# Patient Record
Sex: MALE | Race: WHITE | NOT HISPANIC OR LATINO | Employment: STUDENT | URBAN - METROPOLITAN AREA
[De-identification: names, ages, dates, MRNs, and addresses within clinical notes are randomized per-mention and may not be internally consistent; named-entity substitution may affect disease eponyms.]

---

## 2019-10-15 ENCOUNTER — HOSPITAL ENCOUNTER (EMERGENCY)
Facility: HOSPITAL | Age: 24
Discharge: HOME | End: 2019-10-15
Attending: SURGERY
Payer: MEDICAID

## 2019-10-15 VITALS
WEIGHT: 99.88 LBS | OXYGEN SATURATION: 97 % | HEART RATE: 113 BPM | BODY MASS INDEX: 21.61 KG/M2 | SYSTOLIC BLOOD PRESSURE: 129 MMHG | RESPIRATION RATE: 20 BRPM | DIASTOLIC BLOOD PRESSURE: 76 MMHG | TEMPERATURE: 100 F

## 2019-10-15 DIAGNOSIS — J40 BRONCHITIS: Primary | ICD-10-CM

## 2019-10-15 DIAGNOSIS — R05.9 COUGH: ICD-10-CM

## 2019-10-15 LAB
ALBUMIN SERPL BCP-MCNC: 3.9 G/DL (ref 3.5–5.2)
ALP SERPL-CCNC: 128 U/L (ref 55–135)
ALT SERPL W/O P-5'-P-CCNC: 30 U/L (ref 10–44)
ANION GAP SERPL CALC-SCNC: 12 MMOL/L (ref 8–16)
AST SERPL-CCNC: 29 U/L (ref 10–40)
BASOPHILS # BLD AUTO: 0.01 K/UL (ref 0–0.2)
BASOPHILS NFR BLD: 0.1 % (ref 0–1.9)
BILIRUB SERPL-MCNC: 0.4 MG/DL (ref 0.1–1)
BILIRUB UR QL STRIP: NEGATIVE
BNP SERPL-MCNC: <10 PG/ML (ref 0–99)
BUN SERPL-MCNC: 11 MG/DL (ref 6–20)
CALCIUM SERPL-MCNC: 9.1 MG/DL (ref 8.7–10.5)
CHLORIDE SERPL-SCNC: 102 MMOL/L (ref 95–110)
CK MB SERPL-MCNC: 0.4 NG/ML (ref 0.1–6.5)
CK MB SERPL-RTO: 0.2 % (ref 0–5)
CK SERPL-CCNC: 252 U/L (ref 20–200)
CK SERPL-CCNC: 252 U/L (ref 20–200)
CLARITY UR: CLEAR
CO2 SERPL-SCNC: 23 MMOL/L (ref 23–29)
COLOR UR: YELLOW
CREAT SERPL-MCNC: 0.7 MG/DL (ref 0.5–1.4)
DEPRECATED S PYO AG THROAT QL EIA: NEGATIVE
DIFFERENTIAL METHOD: ABNORMAL
EOSINOPHIL # BLD AUTO: 0.1 K/UL (ref 0–0.5)
EOSINOPHIL NFR BLD: 1.7 % (ref 0–8)
ERYTHROCYTE [DISTWIDTH] IN BLOOD BY AUTOMATED COUNT: 12.1 % (ref 11.5–14.5)
EST. GFR  (AFRICAN AMERICAN): >60 ML/MIN/1.73 M^2
EST. GFR  (NON AFRICAN AMERICAN): >60 ML/MIN/1.73 M^2
GLUCOSE SERPL-MCNC: 97 MG/DL (ref 70–110)
GLUCOSE UR QL STRIP: NEGATIVE
HCT VFR BLD AUTO: 43.9 % (ref 40–54)
HGB BLD-MCNC: 14.5 G/DL (ref 14–18)
HGB UR QL STRIP: NEGATIVE
IMM GRANULOCYTES # BLD AUTO: 0.02 K/UL (ref 0–0.04)
IMM GRANULOCYTES NFR BLD AUTO: 0.3 % (ref 0–0.5)
INFLUENZA A, MOLECULAR: NEGATIVE
INFLUENZA B, MOLECULAR: NEGATIVE
KETONES UR QL STRIP: NEGATIVE
LACTATE SERPL-SCNC: 0.8 MMOL/L (ref 0.5–2.2)
LEUKOCYTE ESTERASE UR QL STRIP: NEGATIVE
LYMPHOCYTES # BLD AUTO: 0.6 K/UL (ref 1–4.8)
LYMPHOCYTES NFR BLD: 8.5 % (ref 18–48)
MCH RBC QN AUTO: 31.2 PG (ref 27–31)
MCHC RBC AUTO-ENTMCNC: 33 G/DL (ref 32–36)
MCV RBC AUTO: 94 FL (ref 82–98)
MONOCYTES # BLD AUTO: 1.2 K/UL (ref 0.3–1)
MONOCYTES NFR BLD: 15.8 % (ref 4–15)
NEUTROPHILS # BLD AUTO: 5.5 K/UL (ref 1.8–7.7)
NEUTROPHILS NFR BLD: 73.6 % (ref 38–73)
NITRITE UR QL STRIP: NEGATIVE
NRBC BLD-RTO: 0 /100 WBC
PH UR STRIP: 7 [PH] (ref 5–8)
PLATELET # BLD AUTO: 188 K/UL (ref 150–350)
PMV BLD AUTO: 10.5 FL (ref 9.2–12.9)
POTASSIUM SERPL-SCNC: 3.9 MMOL/L (ref 3.5–5.1)
PROT SERPL-MCNC: 7.4 G/DL (ref 6–8.4)
PROT UR QL STRIP: NEGATIVE
RBC # BLD AUTO: 4.65 M/UL (ref 4.6–6.2)
SODIUM SERPL-SCNC: 137 MMOL/L (ref 136–145)
SP GR UR STRIP: 1.01 (ref 1–1.03)
SPECIMEN SOURCE: NORMAL
TROPONIN I SERPL DL<=0.01 NG/ML-MCNC: 0.01 NG/ML (ref 0–0.03)
URN SPEC COLLECT METH UR: NORMAL
UROBILINOGEN UR STRIP-ACNC: NEGATIVE EU/DL
WBC # BLD AUTO: 7.52 K/UL (ref 3.9–12.7)

## 2019-10-15 PROCEDURE — 25000003 PHARM REV CODE 250: Performed by: SURGERY

## 2019-10-15 PROCEDURE — 87081 CULTURE SCREEN ONLY: CPT

## 2019-10-15 PROCEDURE — 93005 ELECTROCARDIOGRAM TRACING: CPT

## 2019-10-15 PROCEDURE — 96361 HYDRATE IV INFUSION ADD-ON: CPT

## 2019-10-15 PROCEDURE — 84484 ASSAY OF TROPONIN QUANT: CPT

## 2019-10-15 PROCEDURE — 82553 CREATINE MB FRACTION: CPT

## 2019-10-15 PROCEDURE — 85025 COMPLETE CBC W/AUTO DIFF WBC: CPT

## 2019-10-15 PROCEDURE — 83880 ASSAY OF NATRIURETIC PEPTIDE: CPT

## 2019-10-15 PROCEDURE — 82550 ASSAY OF CK (CPK): CPT

## 2019-10-15 PROCEDURE — 63600175 PHARM REV CODE 636 W HCPCS: Performed by: SURGERY

## 2019-10-15 PROCEDURE — 83605 ASSAY OF LACTIC ACID: CPT

## 2019-10-15 PROCEDURE — 87880 STREP A ASSAY W/OPTIC: CPT

## 2019-10-15 PROCEDURE — 93010 ELECTROCARDIOGRAM REPORT: CPT | Mod: ,,, | Performed by: INTERNAL MEDICINE

## 2019-10-15 PROCEDURE — 96360 HYDRATION IV INFUSION INIT: CPT

## 2019-10-15 PROCEDURE — 93010 EKG 12-LEAD: ICD-10-PCS | Mod: ,,, | Performed by: INTERNAL MEDICINE

## 2019-10-15 PROCEDURE — 81003 URINALYSIS AUTO W/O SCOPE: CPT

## 2019-10-15 PROCEDURE — 87502 INFLUENZA DNA AMP PROBE: CPT

## 2019-10-15 PROCEDURE — 80053 COMPREHEN METABOLIC PANEL: CPT

## 2019-10-15 PROCEDURE — 87040 BLOOD CULTURE FOR BACTERIA: CPT

## 2019-10-15 PROCEDURE — 99285 EMERGENCY DEPT VISIT HI MDM: CPT | Mod: 25

## 2019-10-15 PROCEDURE — 36415 COLL VENOUS BLD VENIPUNCTURE: CPT

## 2019-10-15 RX ORDER — LEVOFLOXACIN 500 MG/1
500 TABLET, FILM COATED ORAL
Status: COMPLETED | OUTPATIENT
Start: 2019-10-15 | End: 2019-10-15

## 2019-10-15 RX ORDER — ACETAMINOPHEN 650 MG/20.3ML
650 LIQUID ORAL
Status: COMPLETED | OUTPATIENT
Start: 2019-10-15 | End: 2019-10-15

## 2019-10-15 RX ORDER — LEVOFLOXACIN 25 MG/ML
500 SOLUTION ORAL DAILY
Qty: 140 ML | Refills: 0 | Status: SHIPPED | OUTPATIENT
Start: 2019-10-15 | End: 2019-10-22

## 2019-10-15 RX ADMIN — SODIUM CHLORIDE 500 ML: 0.9 INJECTION, SOLUTION INTRAVENOUS at 08:10

## 2019-10-15 RX ADMIN — ACETAMINOPHEN 650 MG: 650 SOLUTION ORAL at 10:10

## 2019-10-15 RX ADMIN — LEVOFLOXACIN 500 MG: 500 TABLET, FILM COATED ORAL at 10:10

## 2019-10-15 NOTE — ED TRIAGE NOTES
23 y.o. male presents to ER WALL 01/WALL 01   Chief Complaint   Patient presents with    Fever   pt arrives via [facility] with c/o cold/congestion symptoms & fever x's 1 day. No acute distress noted.

## 2019-10-16 NOTE — ED NOTES
The patient is awake, alert, mother at bedside. Respirations are spontaneous, normal respiratory effort and rate noted, no distress noted, resting comfortably. No change from previous assessment, bed in low, locked position, SR x2, pt able to change position independently. Will continue to monitor.

## 2019-10-16 NOTE — ED NOTES
The patient is awake, alert, mother at bedside. Normal respiratory effort and rate noted, no distress noted, resting comfortably. No change from previous assessment, bed in low, locked position, SR x2, pt able to change position independently. Will continue to monitor.

## 2019-10-16 NOTE — ED NOTES
The patient is awake, alert, mother at bedside. Airway is open and patent, respirations are spontaneous, normal respiratory effort and rate noted, no distress noted, resting comfortably. No change from previous assessment, bed in low, locked position, SR x2, pt able to change position independently. Will continue to monitor.

## 2019-10-16 NOTE — ED PROVIDER NOTES
[facility]                                                 Chief Complaint  23 y.o. male with Fever    History of Present Illness  Glen Moscoso presents to the emergency room with low-grade temperature  Patient has developed a low-grade temperature and a cough, no wheezing  Patient has no sputum production, patient has handicapped with cerebral palsy  Patient is alert and interactive with a very low-grade temperature, no fever  Patient has no symptoms of UTI, mild rhonchi on ER evaluation this evening    The history is provided by the parent   device was not used during this ER visit  Medical history: GERD, cerebral palsy, seizures  Surgeries:  Hip surgery & PEG tube  Review of patient's allergies indicates:  No Known Allergies     I have reviewed all of this patient's past medical, surgical, family, and social   histories as well as active allergies and medications documented in the  electronic medical record    Review of Systems and Physical Exam      Review of Systems  -- Constitution - fever, denies fatigue, no weakness, no chills  -- Eyes - no tearing or redness, no visual disturbance  -- Ear, Nose - no tinnitus or earache, no nasal congestion or discharge  -- Mouth,Throat - no sore throat, no toothache, normal voice, normal swallowing  -- Respiratory - cough and congestion, no shortness of breath, no BOATENG  -- Cardiovascular - denies chest pain, no palpitations, denies claudication  -- Gastrointestinal - denies abdominal pain, nausea, vomiting, or diarrhea  -- Musculoskeletal - denies back pain, negative for trauma or injury  -- Neurological - no headache, denies weakness or seizure; no LOC  -- Skin - denies pallor, rash, or changes in skin. no hives or welts noted    Vital Signs  His tympanic temperature is 99.6 °F (37.6 °C).   His blood pressure is 126/73 and his pulse is 115  His respiration is 20.     Physical Exam  -- Nursing note and vitals reviewed  --  Constitutional: Appears well-developed and well-nourished  -- Head: Atraumatic. Normocephalic. No obvious abnormality  -- Eyes: Pupils are equal and reactive to light. Normal conjunctiva and lids  -- Nose: Nose normal in appearance, nares grossly normal. No discharge  -- Throat: Mucous membranes moist, pharynx normal, normal tonsils. No lesions   -- Ears: External ears and TM normal bilaterally. Normal hearing and no drainage  -- Neck: Normal range of motion. Neck supple. No masses, trachea midline  -- Cardiac: Normal rate, regular rhythm and normal heart sounds  -- Pulmonary: faint rhonchi at the bilateral bases with no active wheezing   -- Abdominal: Soft, no tenderness. Normal bowel sounds. Normal liver edge  -- Musculoskeletal: Normal range of motion, no effusions. Joints stable   -- Neurological: No focal deficits. Showed good interaction with staff  -- Vascular: Posterior tibial, dorsalis pedis and radial pulses 2+ bilaterally      Emergency Room Course      Lab Results     K 3.9      CO2 23   BUN 11   CREATININE 0.7   GLU 97   ALKPHOS 128   AST 29   ALT 30   BILITOT 0.4   ALBUMIN 3.9   PROT 7.4   WBC 7.52   HGB 14.5   HCT 43.9       (H)    (H)   CPKMB 0.4   TROPONINI 0.009   BNP <10   LACTATE 0.8     Urinalysis  -- Urinalysis performed during this ER visit showed no signs of infection     Radiology  -- Chest x-ray showed no infiltrate and showed no acute pathology     Additional Work up  -- the patient tested negative for influenza A in the emergency room today  -- The strep screen was negative  -- Blood cultures have also been drawn, results are pending    Medications Given  acetaminophen oral solution 650 mg (has no administration in time range)   levoFLOXacin tablet 500 mg (has no administration in time range)   sodium chloride 0.9% bolus 500 mL (0 mLs Intravenous Stopped 10/15/19 2202)     ED Management  -- Diagnosis management comments: 23 y.o. male with cough and  congestion  -- patient with cough and congestion with no active wheezing, no SOB noted  -- patient has mild rhonchi with a clear chest x-ray in the emergency room  -- patient has a negative fluid strep swab with a normal white count today  -- patient has a negative urinalysis, afebrile with good stable vital signs    Diagnosis  -- The primary encounter diagnosis was Bronchitis.   -- A diagnosis of Cough was also pertinent to this visit.    Disposition and Plan  -- Disposition: home  -- Condition: stable  -- Follow-up: Parents to follow up with Candelario Santos MD in 1-2 days.  -- I advised the parent(s) that we have found no life threatening condition today  -- At this time, I believe the patient is clinically stable for discharge.   -- The parent(s) acknowledges that close follow up with a MD is required after all ER visits  -- The parent(s) agrees to comply with all instruction and direction given in the ER  -- The parent(s) agrees to return to ER if any symptoms reoccur     This note is dictated on M*Modal word recognition program.  There are word recognition mistakes that are occasionally missed on review.          Bunny Eagle MD  10/15/19 4423

## 2019-10-18 LAB — BACTERIA THROAT CULT: NORMAL

## 2019-10-20 LAB
BACTERIA BLD CULT: NORMAL
BACTERIA BLD CULT: NORMAL

## 2019-11-17 ENCOUNTER — HOSPITAL ENCOUNTER (EMERGENCY)
Facility: HOSPITAL | Age: 24
Discharge: ANOTHER HOSPITAL | End: 2019-11-17
Attending: SURGERY
Payer: MEDICAID

## 2019-11-17 VITALS
RESPIRATION RATE: 20 BRPM | OXYGEN SATURATION: 98 % | SYSTOLIC BLOOD PRESSURE: 148 MMHG | HEART RATE: 105 BPM | BODY MASS INDEX: 18.51 KG/M2 | DIASTOLIC BLOOD PRESSURE: 112 MMHG | WEIGHT: 85.56 LBS | TEMPERATURE: 98 F

## 2019-11-17 DIAGNOSIS — M79.604 RIGHT LEG PAIN: ICD-10-CM

## 2019-11-17 DIAGNOSIS — S72.8X1A OTHER CLOSED FRACTURE OF RIGHT FEMUR, UNSPECIFIED PORTION OF FEMUR, INITIAL ENCOUNTER: Primary | ICD-10-CM

## 2019-11-17 LAB
ALBUMIN SERPL BCP-MCNC: 4.1 G/DL (ref 3.5–5.2)
ALP SERPL-CCNC: 166 U/L (ref 55–135)
ALT SERPL W/O P-5'-P-CCNC: 24 U/L (ref 10–44)
ANION GAP SERPL CALC-SCNC: 12 MMOL/L (ref 8–16)
AST SERPL-CCNC: 24 U/L (ref 10–40)
BASOPHILS # BLD AUTO: 0.02 K/UL (ref 0–0.2)
BASOPHILS NFR BLD: 0.1 % (ref 0–1.9)
BILIRUB SERPL-MCNC: 0.3 MG/DL (ref 0.1–1)
BILIRUB UR QL STRIP: NEGATIVE
BUN SERPL-MCNC: 8 MG/DL (ref 6–20)
CALCIUM SERPL-MCNC: 9.7 MG/DL (ref 8.7–10.5)
CHLORIDE SERPL-SCNC: 103 MMOL/L (ref 95–110)
CLARITY UR: CLEAR
CO2 SERPL-SCNC: 23 MMOL/L (ref 23–29)
COLOR UR: YELLOW
CREAT SERPL-MCNC: 0.7 MG/DL (ref 0.5–1.4)
DEPRECATED S PYO AG THROAT QL EIA: NEGATIVE
DIFFERENTIAL METHOD: ABNORMAL
EOSINOPHIL # BLD AUTO: 0 K/UL (ref 0–0.5)
EOSINOPHIL NFR BLD: 0.1 % (ref 0–8)
ERYTHROCYTE [DISTWIDTH] IN BLOOD BY AUTOMATED COUNT: 11.7 % (ref 11.5–14.5)
EST. GFR  (AFRICAN AMERICAN): >60 ML/MIN/1.73 M^2
EST. GFR  (NON AFRICAN AMERICAN): >60 ML/MIN/1.73 M^2
GLUCOSE SERPL-MCNC: 114 MG/DL (ref 70–110)
GLUCOSE UR QL STRIP: NEGATIVE
HCT VFR BLD AUTO: 45.4 % (ref 40–54)
HGB BLD-MCNC: 15.3 G/DL (ref 14–18)
HGB UR QL STRIP: NEGATIVE
IMM GRANULOCYTES # BLD AUTO: 0.05 K/UL (ref 0–0.04)
IMM GRANULOCYTES NFR BLD AUTO: 0.4 % (ref 0–0.5)
INFLUENZA A, MOLECULAR: NEGATIVE
INFLUENZA B, MOLECULAR: NEGATIVE
KETONES UR QL STRIP: NEGATIVE
LACTATE SERPL-SCNC: 1.3 MMOL/L (ref 0.5–2.2)
LEUKOCYTE ESTERASE UR QL STRIP: NEGATIVE
LYMPHOCYTES # BLD AUTO: 1 K/UL (ref 1–4.8)
LYMPHOCYTES NFR BLD: 7.1 % (ref 18–48)
MCH RBC QN AUTO: 31.9 PG (ref 27–31)
MCHC RBC AUTO-ENTMCNC: 33.7 G/DL (ref 32–36)
MCV RBC AUTO: 95 FL (ref 82–98)
MONOCYTES # BLD AUTO: 1.5 K/UL (ref 0.3–1)
MONOCYTES NFR BLD: 10.5 % (ref 4–15)
NEUTROPHILS # BLD AUTO: 11.6 K/UL (ref 1.8–7.7)
NEUTROPHILS NFR BLD: 81.8 % (ref 38–73)
NITRITE UR QL STRIP: NEGATIVE
NRBC BLD-RTO: 0 /100 WBC
PH UR STRIP: >8 [PH] (ref 5–8)
PLATELET # BLD AUTO: 287 K/UL (ref 150–350)
PMV BLD AUTO: 10.4 FL (ref 9.2–12.9)
POTASSIUM SERPL-SCNC: 3.8 MMOL/L (ref 3.5–5.1)
PROT SERPL-MCNC: 8.2 G/DL (ref 6–8.4)
PROT UR QL STRIP: NEGATIVE
RBC # BLD AUTO: 4.8 M/UL (ref 4.6–6.2)
SODIUM SERPL-SCNC: 138 MMOL/L (ref 136–145)
SP GR UR STRIP: 1.01 (ref 1–1.03)
SPECIMEN SOURCE: NORMAL
URN SPEC COLLECT METH UR: NORMAL
UROBILINOGEN UR STRIP-ACNC: NEGATIVE EU/DL
WBC # BLD AUTO: 14.14 K/UL (ref 3.9–12.7)

## 2019-11-17 PROCEDURE — 81003 URINALYSIS AUTO W/O SCOPE: CPT

## 2019-11-17 PROCEDURE — 96374 THER/PROPH/DIAG INJ IV PUSH: CPT

## 2019-11-17 PROCEDURE — 87880 STREP A ASSAY W/OPTIC: CPT

## 2019-11-17 PROCEDURE — 63600175 PHARM REV CODE 636 W HCPCS: Performed by: SURGERY

## 2019-11-17 PROCEDURE — 96361 HYDRATE IV INFUSION ADD-ON: CPT | Mod: 59

## 2019-11-17 PROCEDURE — 87081 CULTURE SCREEN ONLY: CPT

## 2019-11-17 PROCEDURE — 80053 COMPREHEN METABOLIC PANEL: CPT

## 2019-11-17 PROCEDURE — 85025 COMPLETE CBC W/AUTO DIFF WBC: CPT

## 2019-11-17 PROCEDURE — 87040 BLOOD CULTURE FOR BACTERIA: CPT

## 2019-11-17 PROCEDURE — 96375 TX/PRO/DX INJ NEW DRUG ADDON: CPT

## 2019-11-17 PROCEDURE — 51702 INSERT TEMP BLADDER CATH: CPT

## 2019-11-17 PROCEDURE — 36415 COLL VENOUS BLD VENIPUNCTURE: CPT

## 2019-11-17 PROCEDURE — 99285 EMERGENCY DEPT VISIT HI MDM: CPT | Mod: 25

## 2019-11-17 PROCEDURE — 83605 ASSAY OF LACTIC ACID: CPT

## 2019-11-17 PROCEDURE — 87502 INFLUENZA DNA AMP PROBE: CPT

## 2019-11-17 RX ORDER — SODIUM CHLORIDE 9 MG/ML
1000 INJECTION, SOLUTION INTRAVENOUS
Status: COMPLETED | OUTPATIENT
Start: 2019-11-17 | End: 2019-11-17

## 2019-11-17 RX ORDER — ONDANSETRON 2 MG/ML
4 INJECTION INTRAMUSCULAR; INTRAVENOUS
Status: COMPLETED | OUTPATIENT
Start: 2019-11-17 | End: 2019-11-17

## 2019-11-17 RX ORDER — MORPHINE SULFATE 2 MG/ML
1 INJECTION, SOLUTION INTRAMUSCULAR; INTRAVENOUS
Status: COMPLETED | OUTPATIENT
Start: 2019-11-17 | End: 2019-11-17

## 2019-11-17 RX ADMIN — MORPHINE SULFATE 1 MG: 2 INJECTION, SOLUTION INTRAMUSCULAR; INTRAVENOUS at 07:11

## 2019-11-17 RX ADMIN — ONDANSETRON 4 MG: 2 INJECTION INTRAMUSCULAR; INTRAVENOUS at 07:11

## 2019-11-17 RX ADMIN — SODIUM CHLORIDE 1000 ML: 0.9 INJECTION, SOLUTION INTRAVENOUS at 08:11

## 2019-11-17 NOTE — ED TRIAGE NOTES
24 y.o. male presents to ER ED 05/ED 05   Chief Complaint   Patient presents with    Leg Pain     right   Pt brought to ER by EMS with c/o right leg pain. Pt has history of cerebral palsy, contracted in all 4 extremities. No acute distress noted.

## 2019-11-18 NOTE — ED NOTES
Patient transferred to [facility] ED via [facility]. Mother present and has all belongings. [facility] in [facility] to [facility]. No distress noted to patient; IV saline locked per MD verbal order.
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
## 2019-11-18 NOTE — ED NOTES
Mother inquired about patients night medications (phenobarbital for seizures and antibiotic sulfatrim). Md states OK for mother to administer normal medications. Mother states she will not worry about giving Zyrtec and Baclofen due to patient receiving Morphine. Informed mother to HOLD feeding until assessed by [facility]; mother verbalizes understanding.      Patient only received Phenobarbital and Sulfa antibiotic via G tube per mother.

## 2019-11-18 NOTE — ED NOTES
Patient lying on stretcher, appears to be comfortable. Nonverbal; no indicators of pain present. Respirations even, unlabored. HOB elevated. Iglesias catheter draining; urine yellow and clear. IV fluids infusing; IV dry and intact with no redness or swelling noted. Mother present. Awaiting ambulance for transfer. Will monitor.

## 2019-11-18 NOTE — ED NOTES
MD spoke to patients mother in regards to xray results. Mother reports there has been no falls; states it has become too difficult to move patient from bed. Mother reports patient does frequently move legs in hospital bed at home.

## 2019-11-18 NOTE — ED NOTES
Brittany notified of need of disc of scans today. Padmaja states she will complete and bring to unit.

## 2019-11-18 NOTE — ED NOTES
Patient currently on Sulfatrim for probable bug bite to left wrist. Started on 11/13/10; 20 ml @ 12 hr for 7 days via G tube.
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
## 2019-11-18 NOTE — ED NOTES
Called [facility] to see status of transfer; states they are on their way currently to hospital. Mother updated; stepped out to make a call. Will monitor patient. Patient resting on stretcher; no nonverbal indicators of pain present.

## 2019-11-20 LAB — BACTERIA THROAT CULT: NORMAL

## 2019-11-23 LAB — BACTERIA BLD CULT: NORMAL

## 2020-08-04 ENCOUNTER — HOSPITAL ENCOUNTER (INPATIENT)
Facility: HOSPITAL | Age: 25
LOS: 14 days | Discharge: HOME HEALTH | DRG: 391 | End: 2020-08-18
Attending: SURGERY | Admitting: INTERNAL MEDICINE
Payer: MEDICAID

## 2020-08-04 DIAGNOSIS — R00.0 SINUS TACHYCARDIA: ICD-10-CM

## 2020-08-04 DIAGNOSIS — R50.9 FEVER: ICD-10-CM

## 2020-08-04 DIAGNOSIS — I25.10 CARDIOVASCULAR DISEASE: ICD-10-CM

## 2020-08-04 DIAGNOSIS — R50.9 FUO (FEVER OF UNKNOWN ORIGIN): ICD-10-CM

## 2020-08-04 DIAGNOSIS — R19.7 DIARRHEA OF PRESUMED INFECTIOUS ORIGIN: ICD-10-CM

## 2020-08-04 DIAGNOSIS — E87.6 HYPOKALEMIA: Primary | ICD-10-CM

## 2020-08-04 PROBLEM — R06.82 TACHYPNEA: Status: ACTIVE | Noted: 2020-08-04

## 2020-08-04 PROBLEM — G80.9 CEREBRAL PALSY: Status: ACTIVE | Noted: 2020-08-04

## 2020-08-04 PROBLEM — D72.829 LEUKOCYTOSIS: Status: ACTIVE | Noted: 2020-08-04

## 2020-08-04 LAB
ALBUMIN SERPL BCP-MCNC: 3.1 G/DL (ref 3.5–5.2)
ALP SERPL-CCNC: 93 U/L (ref 55–135)
ALT SERPL W/O P-5'-P-CCNC: 60 U/L (ref 10–44)
ANION GAP SERPL CALC-SCNC: 13 MMOL/L (ref 8–16)
AST SERPL-CCNC: 46 U/L (ref 10–40)
BACTERIA #/AREA URNS HPF: ABNORMAL /HPF
BASOPHILS # BLD AUTO: 0.04 K/UL (ref 0–0.2)
BASOPHILS NFR BLD: 0.3 % (ref 0–1.9)
BILIRUB SERPL-MCNC: 0.3 MG/DL (ref 0.1–1)
BILIRUB UR QL STRIP: NEGATIVE
BUN SERPL-MCNC: 21 MG/DL (ref 6–20)
CALCIUM SERPL-MCNC: 8.4 MG/DL (ref 8.7–10.5)
CHLORIDE SERPL-SCNC: 101 MMOL/L (ref 95–110)
CLARITY UR: CLEAR
CO2 SERPL-SCNC: 29 MMOL/L (ref 23–29)
COLOR UR: YELLOW
CREAT SERPL-MCNC: 0.7 MG/DL (ref 0.5–1.4)
D DIMER PPP IA.FEU-MCNC: 1.89 MG/L FEU
DIFFERENTIAL METHOD: ABNORMAL
EOSINOPHIL # BLD AUTO: 0 K/UL (ref 0–0.5)
EOSINOPHIL NFR BLD: 0 % (ref 0–8)
ERYTHROCYTE [DISTWIDTH] IN BLOOD BY AUTOMATED COUNT: 13.7 % (ref 11.5–14.5)
EST. GFR  (AFRICAN AMERICAN): >60 ML/MIN/1.73 M^2
EST. GFR  (NON AFRICAN AMERICAN): >60 ML/MIN/1.73 M^2
GLUCOSE SERPL-MCNC: 114 MG/DL (ref 70–110)
GLUCOSE UR QL STRIP: NEGATIVE
GROUP A STREP, MOLECULAR: NEGATIVE
HCT VFR BLD AUTO: 42.2 % (ref 40–54)
HGB BLD-MCNC: 13.6 G/DL (ref 14–18)
HGB UR QL STRIP: ABNORMAL
HYALINE CASTS #/AREA URNS LPF: 1 /LPF
IMM GRANULOCYTES # BLD AUTO: 0.11 K/UL (ref 0–0.04)
IMM GRANULOCYTES NFR BLD AUTO: 0.7 % (ref 0–0.5)
INFLUENZA A, MOLECULAR: NEGATIVE
INFLUENZA B, MOLECULAR: NEGATIVE
KETONES UR QL STRIP: ABNORMAL
LACTATE SERPL-SCNC: 1.3 MMOL/L (ref 0.5–2.2)
LDH SERPL L TO P-CCNC: 295 U/L (ref 110–260)
LEUKOCYTE ESTERASE UR QL STRIP: NEGATIVE
LYMPHOCYTES # BLD AUTO: 2.4 K/UL (ref 1–4.8)
LYMPHOCYTES NFR BLD: 15.3 % (ref 18–48)
MCH RBC QN AUTO: 28.3 PG (ref 27–31)
MCHC RBC AUTO-ENTMCNC: 32.2 G/DL (ref 32–36)
MCV RBC AUTO: 88 FL (ref 82–98)
MICROSCOPIC COMMENT: ABNORMAL
MONOCYTES # BLD AUTO: 2.3 K/UL (ref 0.3–1)
MONOCYTES NFR BLD: 14.7 % (ref 4–15)
NEUTROPHILS # BLD AUTO: 10.8 K/UL (ref 1.8–7.7)
NEUTROPHILS NFR BLD: 69 % (ref 38–73)
NITRITE UR QL STRIP: NEGATIVE
NRBC BLD-RTO: 0 /100 WBC
PH UR STRIP: 7 [PH] (ref 5–8)
PLATELET # BLD AUTO: 353 K/UL (ref 150–350)
PMV BLD AUTO: 9.8 FL (ref 9.2–12.9)
POCT GLUCOSE: 97 MG/DL (ref 70–110)
POTASSIUM SERPL-SCNC: 2.2 MMOL/L (ref 3.5–5.1)
PROCALCITONIN SERPL IA-MCNC: 0.21 NG/ML
PROT SERPL-MCNC: 8 G/DL (ref 6–8.4)
PROT UR QL STRIP: ABNORMAL
RBC # BLD AUTO: 4.81 M/UL (ref 4.6–6.2)
RBC #/AREA URNS HPF: 2 /HPF (ref 0–4)
SARS-COV-2 RDRP RESP QL NAA+PROBE: NEGATIVE
SODIUM SERPL-SCNC: 143 MMOL/L (ref 136–145)
SP GR UR STRIP: 1.02 (ref 1–1.03)
SPECIMEN SOURCE: NORMAL
URN SPEC COLLECT METH UR: ABNORMAL
UROBILINOGEN UR STRIP-ACNC: NEGATIVE EU/DL
WBC # BLD AUTO: 15.67 K/UL (ref 3.9–12.7)
WBC #/AREA URNS HPF: 7 /HPF (ref 0–5)

## 2020-08-04 PROCEDURE — 87449 NOS EACH ORGANISM AG IA: CPT

## 2020-08-04 PROCEDURE — 84145 PROCALCITONIN (PCT): CPT

## 2020-08-04 PROCEDURE — U0002 COVID-19 LAB TEST NON-CDC: HCPCS

## 2020-08-04 PROCEDURE — 63600175 PHARM REV CODE 636 W HCPCS: Performed by: INTERNAL MEDICINE

## 2020-08-04 PROCEDURE — 25000003 PHARM REV CODE 250: Performed by: SURGERY

## 2020-08-04 PROCEDURE — 87324 CLOSTRIDIUM AG IA: CPT

## 2020-08-04 PROCEDURE — 86140 C-REACTIVE PROTEIN: CPT

## 2020-08-04 PROCEDURE — 87040 BLOOD CULTURE FOR BACTERIA: CPT | Mod: 59

## 2020-08-04 PROCEDURE — 99222 PR INITIAL HOSPITAL CARE,LEVL II: ICD-10-PCS | Mod: ,,, | Performed by: INTERNAL MEDICINE

## 2020-08-04 PROCEDURE — 63600175 PHARM REV CODE 636 W HCPCS: Performed by: SURGERY

## 2020-08-04 PROCEDURE — 36415 COLL VENOUS BLD VENIPUNCTURE: CPT

## 2020-08-04 PROCEDURE — 99285 EMERGENCY DEPT VISIT HI MDM: CPT | Mod: 25

## 2020-08-04 PROCEDURE — 94761 N-INVAS EAR/PLS OXIMETRY MLT: CPT

## 2020-08-04 PROCEDURE — 96361 HYDRATE IV INFUSION ADD-ON: CPT | Performed by: SURGERY

## 2020-08-04 PROCEDURE — 25000003 PHARM REV CODE 250: Performed by: INTERNAL MEDICINE

## 2020-08-04 PROCEDURE — 96366 THER/PROPH/DIAG IV INF ADDON: CPT | Performed by: SURGERY

## 2020-08-04 PROCEDURE — 82728 ASSAY OF FERRITIN: CPT

## 2020-08-04 PROCEDURE — 80053 COMPREHEN METABOLIC PANEL: CPT

## 2020-08-04 PROCEDURE — 87651 STREP A DNA AMP PROBE: CPT

## 2020-08-04 PROCEDURE — 80184 ASSAY OF PHENOBARBITAL: CPT

## 2020-08-04 PROCEDURE — U0003 INFECTIOUS AGENT DETECTION BY NUCLEIC ACID (DNA OR RNA); SEVERE ACUTE RESPIRATORY SYNDROME CORONAVIRUS 2 (SARS-COV-2) (CORONAVIRUS DISEASE [COVID-19]), AMPLIFIED PROBE TECHNIQUE, MAKING USE OF HIGH THROUGHPUT TECHNOLOGIES AS DESCRIBED BY CMS-2020-01-R: HCPCS

## 2020-08-04 PROCEDURE — 94760 N-INVAS EAR/PLS OXIMETRY 1: CPT

## 2020-08-04 PROCEDURE — G0378 HOSPITAL OBSERVATION PER HR: HCPCS

## 2020-08-04 PROCEDURE — 96365 THER/PROPH/DIAG IV INF INIT: CPT

## 2020-08-04 PROCEDURE — 11000001 HC ACUTE MED/SURG PRIVATE ROOM

## 2020-08-04 PROCEDURE — 87502 INFLUENZA DNA AMP PROBE: CPT

## 2020-08-04 PROCEDURE — 81000 URINALYSIS NONAUTO W/SCOPE: CPT

## 2020-08-04 PROCEDURE — 99222 1ST HOSP IP/OBS MODERATE 55: CPT | Mod: ,,, | Performed by: INTERNAL MEDICINE

## 2020-08-04 PROCEDURE — 83615 LACTATE (LD) (LDH) ENZYME: CPT

## 2020-08-04 PROCEDURE — 85379 FIBRIN DEGRADATION QUANT: CPT

## 2020-08-04 PROCEDURE — 85025 COMPLETE CBC W/AUTO DIFF WBC: CPT

## 2020-08-04 PROCEDURE — 83605 ASSAY OF LACTIC ACID: CPT

## 2020-08-04 RX ORDER — PHENOBARBITAL 20 MG/5ML
100 ELIXIR ORAL NIGHTLY
Status: DISCONTINUED | OUTPATIENT
Start: 2020-08-04 | End: 2020-08-18 | Stop reason: HOSPADM

## 2020-08-04 RX ORDER — ACETAMINOPHEN 160 MG/5ML
15 SOLUTION ORAL
Status: COMPLETED | OUTPATIENT
Start: 2020-08-04 | End: 2020-08-04

## 2020-08-04 RX ORDER — ONDANSETRON 2 MG/ML
4 INJECTION INTRAMUSCULAR; INTRAVENOUS EVERY 8 HOURS PRN
Status: DISCONTINUED | OUTPATIENT
Start: 2020-08-04 | End: 2020-08-18 | Stop reason: HOSPADM

## 2020-08-04 RX ORDER — POTASSIUM CHLORIDE 7.45 MG/ML
10 INJECTION INTRAVENOUS
Status: DISCONTINUED | OUTPATIENT
Start: 2020-08-04 | End: 2020-08-04

## 2020-08-04 RX ORDER — SODIUM CHLORIDE 9 MG/ML
INJECTION, SOLUTION INTRAVENOUS CONTINUOUS
Status: DISCONTINUED | OUTPATIENT
Start: 2020-08-04 | End: 2020-08-05

## 2020-08-04 RX ORDER — SODIUM CHLORIDE 9 MG/ML
500 INJECTION, SOLUTION INTRAVENOUS
Status: DISCONTINUED | OUTPATIENT
Start: 2020-08-04 | End: 2020-08-04

## 2020-08-04 RX ORDER — POTASSIUM CHLORIDE 1.5 G/1.58G
20 POWDER, FOR SOLUTION ORAL ONCE
Status: COMPLETED | OUTPATIENT
Start: 2020-08-04 | End: 2020-08-04

## 2020-08-04 RX ORDER — POTASSIUM CHLORIDE 7.45 MG/ML
10 INJECTION INTRAVENOUS
Status: COMPLETED | OUTPATIENT
Start: 2020-08-04 | End: 2020-08-05

## 2020-08-04 RX ORDER — GABAPENTIN 250 MG/5ML
250 SOLUTION ORAL EVERY 8 HOURS
Status: DISCONTINUED | OUTPATIENT
Start: 2020-08-04 | End: 2020-08-07

## 2020-08-04 RX ORDER — POTASSIUM CHLORIDE 7.45 MG/ML
10 INJECTION INTRAVENOUS
Status: COMPLETED | OUTPATIENT
Start: 2020-08-04 | End: 2020-08-04

## 2020-08-04 RX ORDER — SODIUM CHLORIDE 0.9 % (FLUSH) 0.9 %
10 SYRINGE (ML) INJECTION
Status: DISCONTINUED | OUTPATIENT
Start: 2020-08-04 | End: 2020-08-18 | Stop reason: HOSPADM

## 2020-08-04 RX ORDER — ACETAMINOPHEN 325 MG/1
650 TABLET ORAL EVERY 8 HOURS PRN
Status: DISCONTINUED | OUTPATIENT
Start: 2020-08-04 | End: 2020-08-05

## 2020-08-04 RX ORDER — HYDROCODONE BITARTRATE AND ACETAMINOPHEN 5; 325 MG/1; MG/1
1 TABLET ORAL EVERY 4 HOURS PRN
Status: DISCONTINUED | OUTPATIENT
Start: 2020-08-04 | End: 2020-08-18 | Stop reason: HOSPADM

## 2020-08-04 RX ORDER — BACLOFEN 10 MG/1
10 TABLET ORAL 3 TIMES DAILY
Status: DISCONTINUED | OUTPATIENT
Start: 2020-08-04 | End: 2020-08-18 | Stop reason: HOSPADM

## 2020-08-04 RX ADMIN — ACETAMINOPHEN 579.2 MG: 160 SUSPENSION ORAL at 03:08

## 2020-08-04 RX ADMIN — POTASSIUM CHLORIDE 20 MEQ: 1.5 POWDER, FOR SOLUTION ORAL at 08:08

## 2020-08-04 RX ADMIN — GABAPENTIN 250 MG: 250 SUSPENSION ORAL at 10:08

## 2020-08-04 RX ADMIN — SODIUM CHLORIDE 500 ML: 0.9 INJECTION, SOLUTION INTRAVENOUS at 01:08

## 2020-08-04 RX ADMIN — POTASSIUM CHLORIDE 10 MEQ: 7.46 INJECTION, SOLUTION INTRAVENOUS at 01:08

## 2020-08-04 RX ADMIN — POTASSIUM CHLORIDE 10 MEQ: 7.46 INJECTION, SOLUTION INTRAVENOUS at 08:08

## 2020-08-04 RX ADMIN — SODIUM CHLORIDE: 0.9 INJECTION, SOLUTION INTRAVENOUS at 06:08

## 2020-08-04 RX ADMIN — BACLOFEN 10 MG: 10 TABLET ORAL at 09:08

## 2020-08-04 RX ADMIN — PHENOBARBITAL 100 MG: 20 ELIXIR ORAL at 09:08

## 2020-08-04 RX ADMIN — POTASSIUM CHLORIDE 10 MEQ: 7.46 INJECTION, SOLUTION INTRAVENOUS at 10:08

## 2020-08-04 NOTE — HPI
"Patient presented to ER with fever and cough. Patient has cerebral palsy and is non-verbal. Mother provides history. He was seen at [facility] ER 2 weeks ago and diagnosed with bronchitis and strep throat. Treated with amoxil. He completed antibx 4 days ago. Mom reports loose watery stools 3-4x a day for almost 2 weeks now. She noted he had fever yesterday "He was burning up" but did not check with thermometer. She notes he is breathing a little heavier than normal. + cough. She does not think he is in any pain. No changes to tube feeds (Ensure 4x a day via PEG). No sick contacts or recent travel but was in [facility] ER and was hospitalized at [place] before that for "aspiration" and on antibx then. Patient has low grade temp 99.5. HR 120s. RR 30s. Sats 96% on RA. Rapid COVID negative however note mild elevation LFTs, LDH, ferritin pending. D-dimer elevated, CTA pending. Lactic acid and procalcitonin normal. Repeat COVID swab ordered. WBC 15K. UA not infectious. K 2.2--likely due to diarrhea. GFR > 60 (Cr 0.7 but very low muscle mass). CXR clear. Flu and strep negative. Admitted for treatment of hypokalemia and possible C. Diff vs COVID vs PE.   "

## 2020-08-04 NOTE — ED PROVIDER NOTES
Encounter Date: 8/4/2020       History     Chief Complaint   Patient presents with    Fever     Patient is a 25yo W male with cough, tachypnea and fever noted at home.  He has CP, was treated last week for bronchitis.  Family says he has had several episodes of diarrhea.        Review of patient's allergies indicates:  No Known Allergies  Past Medical History:   Diagnosis Date    Acid reflux     Cerebral palsy     History of hip surgery     S/P percutaneous endoscopic gastrostomy (PEG) tube placement     Seizures      No past surgical history on file.  No family history on file.  Social History     Tobacco Use    Smoking status: Never Smoker    Smokeless tobacco: Never Used   Substance Use Topics    Alcohol use: Not on file    Drug use: Not on file     Review of Systems   Unable to perform ROS: Acuity of condition       Physical Exam     Initial Vitals   BP Pulse Resp Temp SpO2   08/04/20 1140 08/04/20 1140 08/04/20 1140 08/04/20 1146 08/04/20 1140   (!) 158/66 (!) 124 (!) 38 98.7 °F (37.1 °C) 96 %      MAP       --                Physical Exam    Nursing note and vitals reviewed.  Constitutional: He appears well-nourished.   HENT:   Head: Normocephalic and atraumatic.   Eyes: EOM are normal. Pupils are equal, round, and reactive to light.   Neck: Normal range of motion. Neck supple.   Pulmonary/Chest: No respiratory distress. He has no wheezes.   Tachypnea   Abdominal: Soft. He exhibits no distension. There is no abdominal tenderness.   Skin: Skin is warm. No rash noted.         ED Course   Procedures  Labs Reviewed   CBC W/ AUTO DIFFERENTIAL - Abnormal; Notable for the following components:       Result Value    WBC 15.67 (*)     Hemoglobin 13.6 (*)     Platelets 353 (*)     Immature Granulocytes 0.7 (*)     Gran # (ANC) 10.8 (*)     Immature Grans (Abs) 0.11 (*)     Mono # 2.3 (*)     Lymph% 15.3 (*)     All other components within normal limits   COMPREHENSIVE METABOLIC PANEL - Abnormal; Notable for  the following components:    Potassium 2.2 (*)     Glucose 114 (*)     BUN, Bld 21 (*)     Calcium 8.4 (*)     Albumin 3.1 (*)     AST 46 (*)     ALT 60 (*)     All other components within normal limits    Narrative:      Potassium  critical result(s) called and verbal readback obtained   from Jenise Saunders RN by ROSA 08/04/2020 12:57   INFLUENZA A & B BY MOLECULAR   GROUP A STREP, MOLECULAR   CULTURE, BLOOD   CULTURE, BLOOD   LACTIC ACID, PLASMA   SARS-COV-2 RNA AMPLIFICATION, QUAL   URINALYSIS, REFLEX TO URINE CULTURE          Imaging Results          X-Ray Chest AP Portable (Final result)  Result time 08/04/20 13:26:18    Final result by Shawna Aceves MD (08/04/20 13:26:18)                 Impression:      No acute abnormality.      Electronically signed by: Shawna Aceves MD  Date:    08/04/2020  Time:    13:26             Narrative:    EXAMINATION:  XR CHEST AP PORTABLE    CLINICAL HISTORY:  Fever, unspecified    TECHNIQUE:  Single frontal view of the chest was performed.    COMPARISON:  11/17/2019    FINDINGS:  The lungs are clear, with normal appearance of pulmonary vasculature and no pleural effusion or pneumothorax.    The cardiac silhouette is normal in size. The hilar and mediastinal contours are unremarkable.    Bones are intact.                                  Patient initially met some parameters for Sepsis screening, IV fluids administered.  Antibiotics withheld pending results of Lactic acid, which were within normal limits.                               Clinical Impression:       ICD-10-CM ICD-9-CM   1. Hypokalemia  E87.6 276.8   2. Fever  R50.9 780.60         Disposition:   Disposition: Placed in Observation  Condition: Stable                        Deuce Wood Jr., MD  08/04/20 1527       Deuce Wood Jr., MD  08/04/20 1527       Deuce Wood Jr., MD  08/04/20 [id number]

## 2020-08-04 NOTE — ED NOTES
Pt admitted to room 321. Pt transferred via stretcher by nurse in no distress. VSS. Bedside report given to Nery.

## 2020-08-04 NOTE — ASSESSMENT & PLAN NOTE
Repeat COVID ordered; rapid negative but + cough, diarrhea, mild LFT elevation  CXR without infiltrates  D-dimer elevated so checking CTA for PE tonight. Will start heparin gtt if CTA positive  No antibx for now with normal procal but aspiration in differential as well so could add Zosyn if needed

## 2020-08-04 NOTE — ED TRIAGE NOTES
24 y.o. male presents to ER ED 04/ED 04   Chief Complaint   Patient presents with    Fever   pt arrived via [facility] from home, mother reports renectly dx with strep & bronchitis, finished ABX but not getting better, c/o fever, productive cough, SOB, COVID - 2wks ago. No acute distress noted.

## 2020-08-04 NOTE — ED NOTES
The patient is awake, alert, respirations are spontaneous, normal respiratory effort and rate noted, resting comfortably. Bed in low, locked position, SR x2, will continue to monitor.

## 2020-08-04 NOTE — ED NOTES
The patient is awake, alert, mother at bedside. Respirations are spontaneous, normal respiratory effort and rate noted, no distress noted, resting comfortably. VSS, no change from previous assessment. Bed in low, locked position, SR x2. Will continue to monitor.

## 2020-08-04 NOTE — ED NOTES
The patient is awake, alert, mother at bedside, airway is open and patent, respirations are spontaneous. VSS, no change from previous assessment. Bed in low, locked position, SR x2. Will continue to monitor.

## 2020-08-04 NOTE — ASSESSMENT & PLAN NOTE
Antibiotic associate diarrhea vs true C. Diff  C. Diff pending  If worsening clinical status will start PO Vanc for treatment; holding tonight as true clinical picture not yet clear

## 2020-08-04 NOTE — ED NOTES
The patient is awake, alert, mother at bedside. Airway is open and patent, respirations are spontaneous, no distress noted. Bed in low, locked position, SR x2, pt able to change position independently. Will continue to monitor.

## 2020-08-04 NOTE — ASSESSMENT & PLAN NOTE
Differential broad  Low grade fevers  C. Diff possible  CXR clear  UA clear  Aspiration in differential    Rule out PE  Hold antibx for now as lactic acid and procal is normal but low threshold to start if any decompensation overnight

## 2020-08-04 NOTE — ASSESSMENT & PLAN NOTE
NS 125cc/hr  Likely some dehydration from frequent diarrhea (very low muscle mass so I trust Cr less)  D-dimer elevated so assessing for PE as well

## 2020-08-04 NOTE — SUBJECTIVE & OBJECTIVE
Past Medical History:   Diagnosis Date    Acid reflux     Cerebral palsy     History of hip surgery     S/P percutaneous endoscopic gastrostomy (PEG) tube placement     Seizures        History reviewed. No pertinent surgical history.    Review of patient's allergies indicates:  No Known Allergies    No current facility-administered medications on file prior to encounter.      Current Outpatient Medications on File Prior to Encounter   Medication Sig    baclofen (LIORESAL) 10 MG tablet Take 10 mg by mouth 3 (three) times daily.    diphenhydrAMINE (BENADRYL) 12.5 mg/5 mL elixir Take by mouth 4 (four) times daily as needed for Allergies.    gabapentin (NEURONTIN) 250 mg/5 mL solution Take by mouth 3 (three) times daily.    ibuprofen (ADVIL,MOTRIN) 100 mg/5 mL suspension Take by mouth every 6 (six) hours as needed for Temperature greater than.    Lactobacillus rhamnosus GG (CULTURELLE) 10 billion cell capsule 1 capsule by Per G Tube route once daily.    lactulose (CHRONULAC) 10 gram/15 mL solution Take by mouth 3 (three) times daily.    nystatin (MYCOSTATIN) cream Apply topically 2 (two) times daily.    [DISCONTINUED] cetirizine (ZYRTEC) 1 mg/mL syrup Take by mouth once daily.    [DISCONTINUED] PHENobarbital 20 mg/5 mL (4 mg/mL) elixir Take by mouth every evening.      Family History     None        Tobacco Use    Smoking status: Never Smoker    Smokeless tobacco: Never Used   Substance and Sexual Activity    Alcohol use: Never     Frequency: Never    Drug use: Not Currently    Sexual activity: Not on file     Review of Systems   Unable to perform ROS: Patient nonverbal     Objective:     Vital Signs (Most Recent):  Temp: 99.5 °F (37.5 °C) (08/04/20 1653)  Pulse: (!) 121 (08/04/20 1653)  Resp: (!) 30 (08/04/20 1653)  BP: 121/74 (08/04/20 1653)  SpO2: 96 % (08/04/20 1653) Vital Signs (24h Range):  Temp:  [98.7 °F (37.1 °C)-100.7 °F (38.2 °C)] 99.5 °F (37.5 °C)  Pulse:  [117-124] 121  Resp:  [30-38]  30  SpO2:  [94 %-97 %] 96 %  BP: (121-158)/(66-89) 121/74     Weight: 35.4 kg (78 lb 0.7 oz)  Body mass index is 16.89 kg/m².    Physical Exam  Vitals signs and nursing note reviewed.   Constitutional:       Appearance: He is well-developed.   HENT:      Head: Normocephalic and atraumatic.      Right Ear: External ear normal.      Left Ear: External ear normal.      Nose: Nose normal.   Eyes:      General:         Right eye: No discharge.         Left eye: No discharge.      Conjunctiva/sclera: Conjunctivae normal.      Pupils: Pupils are equal, round, and reactive to light.      Comments: Strabismus right eye   Neck:      Musculoskeletal: Neck supple.      Thyroid: No thyromegaly.   Cardiovascular:      Rate and Rhythm: Regular rhythm.      Heart sounds: No murmur. No friction rub. No gallop.       Comments: Tachycardia, regular  Pulmonary:      Effort: Pulmonary effort is normal. No respiratory distress.      Breath sounds: Normal breath sounds. No wheezing or rales.   Abdominal:      General: Abdomen is flat. Bowel sounds are normal. There is no distension.      Palpations: Abdomen is soft.      Tenderness: There is no abdominal tenderness.      Comments: PEG in place with mild redness, no drainage   Musculoskeletal:      Right lower leg: No edema.      Left lower leg: No edema.      Comments: Muscle atrophy  contractures   Lymphadenopathy:      Cervical: No cervical adenopathy.   Skin:     General: Skin is warm and dry.   Neurological:      Mental Status: He is alert.      Comments: Patient alert but otherwise not responsive and can not cooperate in exam, severe CP           CRANIAL NERVES     CN III, IV, VI   Pupils are equal, round, and reactive to light.       Significant Labs:   CBC:   Recent Labs   Lab 08/04/20  1215   WBC 15.67*   HGB 13.6*   HCT 42.2   *     CMP:   Recent Labs   Lab 08/04/20  1216      K 2.2*      CO2 29   *   BUN 21*   CREATININE 0.7   CALCIUM 8.4*   PROT 8.0    ALBUMIN 3.1*   BILITOT 0.3   ALKPHOS 93   AST 46*   ALT 60*   ANIONGAP 13   EGFRNONAA >60     Cardiac Markers: No results for input(s): CKMB, MYOGLOBIN, BNP, TROPISTAT in the last 48 hours.  Troponin: No results for input(s): TROPONINI in the last 48 hours.  Urine Studies:   Recent Labs   Lab 08/04/20  1451   COLORU Yellow   APPEARANCEUA Clear   PHUR 7.0   SPECGRAV 1.020   PROTEINUA 2+*   GLUCUA Negative   KETONESU Trace*   BILIRUBINUA Negative   OCCULTUA Trace*   NITRITE Negative   UROBILINOGEN Negative   LEUKOCYTESUR Negative   RBCUA 2   WBCUA 7*   BACTERIA None   HYALINECASTS 1     All pertinent labs within the past 24 hours have been reviewed.    Significant Imaging: I have reviewed all pertinent imaging results/findings within the past 24 hours.

## 2020-08-04 NOTE — H&P
"[facility] Medicine  History & Physical    Patient Name: Glen Moscoso  MRN: [id number]  Admission Date: 8/4/2020  Attending Physician: Anjelica Brandt MD   Primary Care Provider: Yadi Lawson MD         Patient information was obtained from relative(s) and ER records.     Subjective:     Principal Problem:Hypokalemia    Chief Complaint:   Chief Complaint   Patient presents with    Fever        HPI: Patient presented to ER with fever and cough. Patient has cerebral palsy and is non-verbal. Mother provides history. He was seen at [facility] ER 2 weeks ago and diagnosed with bronchitis and strep throat. Treated with amoxil. He completed antibx 4 days ago. Mom reports loose watery stools 3-4x a day for almost 2 weeks now. She noted he had fever yesterday "He was burning up" but did not check with thermometer. She notes he is breathing a little heavier than normal. + cough. She does not think he is in any pain. No changes to tube feeds (Ensure 4x a day via PEG). No sick contacts or recent travel but was in [facility] ER and was hospitalized at [place] before that for "aspiration" and on antibx then. Patient has low grade temp 99.5. HR 120s. RR 30s. Sats 96% on RA. Rapid COVID negative however note mild elevation LFTs, LDH, ferritin pending. D-dimer elevated, CTA pending. Lactic acid and procalcitonin normal. Repeat COVID swab ordered. WBC 15K. UA not infectious. K 2.2--likely due to diarrhea. GFR > 60 (Cr 0.7 but very low muscle mass). CXR clear. Flu and strep negative. Admitted for treatment of hypokalemia and possible C. Diff vs COVID vs PE.     Past Medical History:   Diagnosis Date    Acid reflux     Cerebral palsy     History of hip surgery     S/P percutaneous endoscopic gastrostomy (PEG) tube placement     Seizures        History reviewed. No pertinent surgical history.    Review of patient's allergies indicates:  No Known Allergies    No current facility-administered medications on file " prior to encounter.      Current Outpatient Medications on File Prior to Encounter   Medication Sig    baclofen (LIORESAL) 10 MG tablet Take 10 mg by mouth 3 (three) times daily.    diphenhydrAMINE (BENADRYL) 12.5 mg/5 mL elixir Take by mouth 4 (four) times daily as needed for Allergies.    gabapentin (NEURONTIN) 250 mg/5 mL solution Take by mouth 3 (three) times daily.    ibuprofen (ADVIL,MOTRIN) 100 mg/5 mL suspension Take by mouth every 6 (six) hours as needed for Temperature greater than.    Lactobacillus rhamnosus GG (CULTURELLE) 10 billion cell capsule 1 capsule by Per G Tube route once daily.    lactulose (CHRONULAC) 10 gram/15 mL solution Take by mouth 3 (three) times daily.    nystatin (MYCOSTATIN) cream Apply topically 2 (two) times daily.    [DISCONTINUED] cetirizine (ZYRTEC) 1 mg/mL syrup Take by mouth once daily.    [DISCONTINUED] PHENobarbital 20 mg/5 mL (4 mg/mL) elixir Take by mouth every evening.      Family History     None        Tobacco Use    Smoking status: Never Smoker    Smokeless tobacco: Never Used   Substance and Sexual Activity    Alcohol use: Never     Frequency: Never    Drug use: Not Currently    Sexual activity: Not on file     Review of Systems   Unable to perform ROS: Patient nonverbal     Objective:     Vital Signs (Most Recent):  Temp: 99.5 °F (37.5 °C) (08/04/20 1653)  Pulse: (!) 121 (08/04/20 1653)  Resp: (!) 30 (08/04/20 1653)  BP: 121/74 (08/04/20 1653)  SpO2: 96 % (08/04/20 1653) Vital Signs (24h Range):  Temp:  [98.7 °F (37.1 °C)-100.7 °F (38.2 °C)] 99.5 °F (37.5 °C)  Pulse:  [117-124] 121  Resp:  [30-38] 30  SpO2:  [94 %-97 %] 96 %  BP: (121-158)/(66-89) 121/74     Weight: 35.4 kg (78 lb 0.7 oz)  Body mass index is 16.89 kg/m².    Physical Exam  Vitals signs and nursing note reviewed.   Constitutional:       Appearance: He is well-developed.   HENT:      Head: Normocephalic and atraumatic.      Right Ear: External ear normal.      Left Ear: External ear  normal.      Nose: Nose normal.   Eyes:      General:         Right eye: No discharge.         Left eye: No discharge.      Conjunctiva/sclera: Conjunctivae normal.      Pupils: Pupils are equal, round, and reactive to light.      Comments: Strabismus right eye   Neck:      Musculoskeletal: Neck supple.      Thyroid: No thyromegaly.   Cardiovascular:      Rate and Rhythm: Regular rhythm.      Heart sounds: No murmur. No friction rub. No gallop.       Comments: Tachycardia, regular  Pulmonary:      Effort: Pulmonary effort is normal. No respiratory distress.      Breath sounds: Normal breath sounds. No wheezing or rales.   Abdominal:      General: Abdomen is flat. Bowel sounds are normal. There is no distension.      Palpations: Abdomen is soft.      Tenderness: There is no abdominal tenderness.      Comments: PEG in place with mild redness, no drainage   Musculoskeletal:      Right lower leg: No edema.      Left lower leg: No edema.      Comments: Muscle atrophy  contractures   Lymphadenopathy:      Cervical: No cervical adenopathy.   Skin:     General: Skin is warm and dry.   Neurological:      Mental Status: He is alert.      Comments: Patient alert but otherwise not responsive and can not cooperate in exam, severe CP           CRANIAL NERVES     CN III, IV, VI   Pupils are equal, round, and reactive to light.       Significant Labs:   CBC:   Recent Labs   Lab 08/04/20  1215   WBC 15.67*   HGB 13.6*   HCT 42.2   *     CMP:   Recent Labs   Lab 08/04/20  1216      K 2.2*      CO2 29   *   BUN 21*   CREATININE 0.7   CALCIUM 8.4*   PROT 8.0   ALBUMIN 3.1*   BILITOT 0.3   ALKPHOS 93   AST 46*   ALT 60*   ANIONGAP 13   EGFRNONAA >60     Cardiac Markers: No results for input(s): CKMB, MYOGLOBIN, BNP, TROPISTAT in the last 48 hours.  Troponin: No results for input(s): TROPONINI in the last 48 hours.  Urine Studies:   Recent Labs   Lab 08/04/20  1451   COLORU Yellow   APPEARANCEUA Clear   PHUR 7.0    SPECGRAV 1.020   PROTEINUA 2+*   GLUCUA Negative   KETONESU Trace*   BILIRUBINUA Negative   OCCULTUA Trace*   NITRITE Negative   UROBILINOGEN Negative   LEUKOCYTESUR Negative   RBCUA 2   WBCUA 7*   BACTERIA None   HYALINECASTS 1     All pertinent labs within the past 24 hours have been reviewed.    Significant Imaging: I have reviewed all pertinent imaging results/findings within the past 24 hours.    Assessment/Plan:     * Hypokalemia  Replace IV and via PEG today  Labs in AM  Likely due to severe diarrhea  telemetry        Leukocytosis  Differential broad  Low grade fevers  C. Diff possible  CXR clear  UA clear  Aspiration in differential    Rule out PE  Hold antibx for now as lactic acid and procal is normal but low threshold to start if any decompensation overnight      Diarrhea of presumed infectious origin  Antibiotic associate diarrhea vs true C. Diff  C. Diff pending  If worsening clinical status will start PO Vanc for treatment; holding tonight as true clinical picture not yet clear      Tachypnea  Repeat COVID ordered; rapid negative but + cough, diarrhea, mild LFT elevation  CXR without infiltrates  D-dimer elevated so checking CTA for PE tonight. Will start heparin gtt if CTA positive  No antibx for now with normal procal but aspiration in differential as well so could add Zosyn if needed      Sinus tachycardia  NS 125cc/hr  Likely some dehydration from frequent diarrhea (very low muscle mass so I trust Cr less)  D-dimer elevated so assessing for PE as well      Cerebral palsy  Cont home phenobarbital and baclofen  Cont tube feeds per home routine via PEG        VTE Risk Mitigation (From admission, onward)         Ordered     IP VTE LOW RISK PATIENT  Once      08/04/20 1658     Place SHANELLE hose  Until discontinued      08/04/20 1658                   Anjelica Brandt MD  Department of Hospital Medicine   [facility]

## 2020-08-04 NOTE — ED NOTES
The patient is awake, alert, calm, respirations are spontaneous, resting comfortably. No change from previous assessment, bed in low, locked position, SR x2. Will continue to monitor.

## 2020-08-05 LAB
ALBUMIN SERPL BCP-MCNC: 2.5 G/DL (ref 3.5–5.2)
ALP SERPL-CCNC: 83 U/L (ref 55–135)
ALT SERPL W/O P-5'-P-CCNC: 47 U/L (ref 10–44)
ANION GAP SERPL CALC-SCNC: 8 MMOL/L (ref 8–16)
AST SERPL-CCNC: 35 U/L (ref 10–40)
BASOPHILS # BLD AUTO: 0.02 K/UL (ref 0–0.2)
BASOPHILS NFR BLD: 0.2 % (ref 0–1.9)
BILIRUB SERPL-MCNC: 0.4 MG/DL (ref 0.1–1)
BUN SERPL-MCNC: 12 MG/DL (ref 6–20)
C DIFF GDH STL QL: NEGATIVE
C DIFF TOX A+B STL QL IA: NEGATIVE
CALCIUM SERPL-MCNC: 7.8 MG/DL (ref 8.7–10.5)
CHLORIDE SERPL-SCNC: 104 MMOL/L (ref 95–110)
CO2 SERPL-SCNC: 28 MMOL/L (ref 23–29)
CREAT SERPL-MCNC: 0.6 MG/DL (ref 0.5–1.4)
CRP SERPL-MCNC: 58.1 MG/L (ref 0–8.2)
DIFFERENTIAL METHOD: ABNORMAL
EOSINOPHIL # BLD AUTO: 0.7 K/UL (ref 0–0.5)
EOSINOPHIL NFR BLD: 6.5 % (ref 0–8)
ERYTHROCYTE [DISTWIDTH] IN BLOOD BY AUTOMATED COUNT: 13.6 % (ref 11.5–14.5)
EST. GFR  (AFRICAN AMERICAN): >60 ML/MIN/1.73 M^2
EST. GFR  (NON AFRICAN AMERICAN): >60 ML/MIN/1.73 M^2
FERRITIN SERPL-MCNC: 173 NG/ML (ref 20–300)
GLUCOSE SERPL-MCNC: 100 MG/DL (ref 70–110)
HCT VFR BLD AUTO: 37.4 % (ref 40–54)
HGB BLD-MCNC: 12 G/DL (ref 14–18)
IMM GRANULOCYTES # BLD AUTO: 0.07 K/UL (ref 0–0.04)
IMM GRANULOCYTES NFR BLD AUTO: 0.7 % (ref 0–0.5)
LYMPHOCYTES # BLD AUTO: 1.6 K/UL (ref 1–4.8)
LYMPHOCYTES NFR BLD: 14.9 % (ref 18–48)
MCH RBC QN AUTO: 28.6 PG (ref 27–31)
MCHC RBC AUTO-ENTMCNC: 32.1 G/DL (ref 32–36)
MCV RBC AUTO: 89 FL (ref 82–98)
MONOCYTES # BLD AUTO: 0.9 K/UL (ref 0.3–1)
MONOCYTES NFR BLD: 8.5 % (ref 4–15)
NEUTROPHILS # BLD AUTO: 7.4 K/UL (ref 1.8–7.7)
NEUTROPHILS NFR BLD: 69.2 % (ref 38–73)
NRBC BLD-RTO: 0 /100 WBC
OB PNL STL: POSITIVE
PHENOBARB SERPL-MCNC: 25.8 UG/DL (ref 15–40)
PLATELET # BLD AUTO: 298 K/UL (ref 150–350)
PMV BLD AUTO: 9.8 FL (ref 9.2–12.9)
POCT GLUCOSE: 102 MG/DL (ref 70–110)
POCT GLUCOSE: 85 MG/DL (ref 70–110)
POCT GLUCOSE: 97 MG/DL (ref 70–110)
POCT GLUCOSE: 98 MG/DL (ref 70–110)
POTASSIUM SERPL-SCNC: 3.3 MMOL/L (ref 3.5–5.1)
PROT SERPL-MCNC: 6.4 G/DL (ref 6–8.4)
RBC # BLD AUTO: 4.19 M/UL (ref 4.6–6.2)
RV AG STL QL IA.RAPID: NEGATIVE
SARS-COV-2 RNA RESP QL NAA+PROBE: NOT DETECTED
SODIUM SERPL-SCNC: 140 MMOL/L (ref 136–145)
WBC # BLD AUTO: 10.74 K/UL (ref 3.9–12.7)

## 2020-08-05 PROCEDURE — 25000003 PHARM REV CODE 250: Performed by: SURGERY

## 2020-08-05 PROCEDURE — 96361 HYDRATE IV INFUSION ADD-ON: CPT | Performed by: SURGERY

## 2020-08-05 PROCEDURE — 93010 ELECTROCARDIOGRAM REPORT: CPT | Mod: ,,, | Performed by: INTERNAL MEDICINE

## 2020-08-05 PROCEDURE — 89055 LEUKOCYTE ASSESSMENT FECAL: CPT

## 2020-08-05 PROCEDURE — 94760 N-INVAS EAR/PLS OXIMETRY 1: CPT

## 2020-08-05 PROCEDURE — 82272 OCCULT BLD FECES 1-3 TESTS: CPT

## 2020-08-05 PROCEDURE — 87045 FECES CULTURE AEROBIC BACT: CPT

## 2020-08-05 PROCEDURE — 85025 COMPLETE CBC W/AUTO DIFF WBC: CPT

## 2020-08-05 PROCEDURE — 93010 EKG 12-LEAD: ICD-10-PCS | Mod: ,,, | Performed by: INTERNAL MEDICINE

## 2020-08-05 PROCEDURE — 99232 SBSQ HOSP IP/OBS MODERATE 35: CPT | Mod: ,,, | Performed by: INTERNAL MEDICINE

## 2020-08-05 PROCEDURE — 87427 SHIGA-LIKE TOXIN AG IA: CPT

## 2020-08-05 PROCEDURE — 87425 ROTAVIRUS AG IA: CPT

## 2020-08-05 PROCEDURE — 63600175 PHARM REV CODE 636 W HCPCS: Performed by: NURSE PRACTITIONER

## 2020-08-05 PROCEDURE — 36415 COLL VENOUS BLD VENIPUNCTURE: CPT

## 2020-08-05 PROCEDURE — 25500020 PHARM REV CODE 255: Performed by: INTERNAL MEDICINE

## 2020-08-05 PROCEDURE — 87046 STOOL CULTR AEROBIC BACT EA: CPT | Mod: 59

## 2020-08-05 PROCEDURE — 11000001 HC ACUTE MED/SURG PRIVATE ROOM

## 2020-08-05 PROCEDURE — 25000003 PHARM REV CODE 250: Performed by: NURSE PRACTITIONER

## 2020-08-05 PROCEDURE — 93005 ELECTROCARDIOGRAM TRACING: CPT

## 2020-08-05 PROCEDURE — 63600175 PHARM REV CODE 636 W HCPCS: Performed by: INTERNAL MEDICINE

## 2020-08-05 PROCEDURE — 80053 COMPREHEN METABOLIC PANEL: CPT

## 2020-08-05 PROCEDURE — 96366 THER/PROPH/DIAG IV INF ADDON: CPT | Performed by: SURGERY

## 2020-08-05 PROCEDURE — 99232 PR SUBSEQUENT HOSPITAL CARE,LEVL II: ICD-10-PCS | Mod: ,,, | Performed by: INTERNAL MEDICINE

## 2020-08-05 PROCEDURE — 25000003 PHARM REV CODE 250: Performed by: INTERNAL MEDICINE

## 2020-08-05 RX ORDER — POTASSIUM CHLORIDE 1.5 G/1.58G
40 POWDER, FOR SOLUTION ORAL ONCE
Status: COMPLETED | OUTPATIENT
Start: 2020-08-05 | End: 2020-08-05

## 2020-08-05 RX ORDER — ACETAMINOPHEN 325 MG/1
650 TABLET ORAL EVERY 8 HOURS PRN
Status: DISCONTINUED | OUTPATIENT
Start: 2020-08-05 | End: 2020-08-18 | Stop reason: HOSPADM

## 2020-08-05 RX ADMIN — GABAPENTIN 250 MG: 250 SUSPENSION ORAL at 10:08

## 2020-08-05 RX ADMIN — POTASSIUM CHLORIDE 40 MEQ: 1.5 POWDER, FOR SOLUTION ORAL at 12:08

## 2020-08-05 RX ADMIN — BACLOFEN 10 MG: 10 TABLET ORAL at 02:08

## 2020-08-05 RX ADMIN — IOHEXOL 75 ML: 350 INJECTION, SOLUTION INTRAVENOUS at 09:08

## 2020-08-05 RX ADMIN — BACLOFEN 10 MG: 10 TABLET ORAL at 08:08

## 2020-08-05 RX ADMIN — ACETAMINOPHEN 650 MG: 325 TABLET ORAL at 12:08

## 2020-08-05 RX ADMIN — GABAPENTIN 250 MG: 250 SUSPENSION ORAL at 02:08

## 2020-08-05 RX ADMIN — POTASSIUM CHLORIDE 10 MEQ: 7.46 INJECTION, SOLUTION INTRAVENOUS at 12:08

## 2020-08-05 RX ADMIN — PHENOBARBITAL 100 MG: 20 ELIXIR ORAL at 08:08

## 2020-08-05 RX ADMIN — BACLOFEN 10 MG: 10 TABLET ORAL at 09:08

## 2020-08-05 RX ADMIN — POTASSIUM CHLORIDE 125 ML/HR: 2 INJECTION, SOLUTION, CONCENTRATE INTRAVENOUS at 12:08

## 2020-08-05 RX ADMIN — SODIUM CHLORIDE: 0.9 INJECTION, SOLUTION INTRAVENOUS at 06:08

## 2020-08-05 RX ADMIN — GABAPENTIN 250 MG: 250 SUSPENSION ORAL at 05:08

## 2020-08-05 RX ADMIN — POTASSIUM CHLORIDE 10 MEQ: 7.46 INJECTION, SOLUTION INTRAVENOUS at 02:08

## 2020-08-05 NOTE — PROGRESS NOTES
"[facility]  Progress Note    Patient Name: Glen Moscoso  MRN: [id number]  Patient Class: IP- Inpatient   Admission Date: 8/4/2020  Length of Stay: 0 days  Attending Physician: Anjelica Brandt MD  Primary Care Provider: Yadi Lawson MD        Subjective:     Principal Problem:Hypokalemia        HPI:  Patient presented to ER with fever and cough. Patient has cerebral palsy and is non-verbal. Mother provides history. He was seen at [facility] ER 2 weeks ago and diagnosed with bronchitis and strep throat. Treated with amoxil. He completed antibx 4 days ago. Mom reports loose watery stools 3-4x a day for almost 2 weeks now. She noted he had fever yesterday "He was burning up" but did not check with thermometer. She notes he is breathing a little heavier than normal. + cough. She does not think he is in any pain. No changes to tube feeds (Ensure 4x a day via PEG). No sick contacts or recent travel but was in [facility] ER and was hospitalized at [place] before that for "aspiration" and on antibx then. Patient has low grade temp 99.5. HR 120s. RR 30s. Sats 96% on RA. Rapid COVID negative however note mild elevation LFTs, LDH, ferritin pending. D-dimer elevated, CTA pending. Lactic acid and procalcitonin normal. Repeat COVID swab ordered. WBC 15K. UA not infectious. K 2.2--likely due to diarrhea. GFR > 60 (Cr 0.7 but very low muscle mass). CXR clear. Flu and strep negative. Admitted for treatment of hypokalemia and possible C. Diff vs COVID vs PE.     Overview/Hospital Course:  CTA done this am negative for PE. POX 96% RR 18. He is still tachycardiac. With low grade fever. And diarrhea persist. K+ is better after 5-10meq KCL riders and 20meq via g tube.       Review of Systems   Unable to perform ROS: Patient nonverbal     Objective:     Vital Signs (Most Recent):  Temp: 98.4 °F (36.9 °C) (08/05/20 0832)  Pulse: (!) 129 (08/05/20 0851)  Resp: 20 (08/05/20 0832)  BP: (!) 134/93 (08/05/20 " 0832)  SpO2: 96 % (08/05/20 0832) Vital Signs (24h Range):  Temp:  [96.8 °F (36 °C)-100.7 °F (38.2 °C)] 98.4 °F (36.9 °C)  Pulse:  [] 129  Resp:  [18-38] 20  SpO2:  [93 %-97 %] 96 %  BP: (121-158)/(66-93) 134/93     Weight: 35.4 kg (78 lb 0.7 oz)  Body mass index is 16.89 kg/m².    Physical Exam  Vitals signs and nursing note reviewed.   Constitutional:       Appearance: He is well-developed.   HENT:      Head: Normocephalic and atraumatic.      Right Ear: External ear normal.      Left Ear: External ear normal.      Nose: Nose normal.   Eyes:      General:         Right eye: No discharge.         Left eye: No discharge.      Conjunctiva/sclera: Conjunctivae normal.      Pupils: Pupils are equal, round, and reactive to light.      Comments: Strabismus right eye   Neck:      Musculoskeletal: Neck supple.      Thyroid: No thyromegaly.   Cardiovascular:      Rate and Rhythm: Regular rhythm.      Heart sounds: No murmur. No friction rub. No gallop.       Comments: Tachycardia, regular  Pulmonary:      Effort: Pulmonary effort is normal. No respiratory distress.      Breath sounds: Normal breath sounds. No wheezing or rales.   Abdominal:      General: Abdomen is flat. Bowel sounds are normal. There is no distension.      Palpations: Abdomen is soft.      Tenderness: There is no abdominal tenderness.      Comments: PEG in place with mild redness, no drainage   Musculoskeletal:      Right lower leg: No edema.      Left lower leg: No edema.      Comments: Muscle atrophy  contractures   Lymphadenopathy:      Cervical: No cervical adenopathy.   Skin:     General: Skin is warm and dry.   Neurological:      Mental Status: He is alert.      Comments: Patient alert but otherwise not responsive and can not cooperate in exam, severe CP           CRANIAL NERVES     CN III, IV, VI   Pupils are equal, round, and reactive to light.       Significant Labs: covid screen negative-- repeat pending   CBC:   Recent Labs   Lab  20  1215 20  0624   WBC 15.67* 10.74   HGB 13.6* 12.0*   HCT 42.2 37.4*   * 298     CMP:   Recent Labs   Lab 20  1216 20  0624    140   K 2.2* 3.3*    104   CO2 29 28   * 100   BUN 21* 12   CREATININE 0.7 0.6   CALCIUM 8.4* 7.8*   PROT 8.0 6.4   ALBUMIN 3.1* 2.5*   BILITOT 0.3 0.4   ALKPHOS 93 83   AST 46* 35   ALT 60* 47*   ANIONGAP 13 8   EGFRNONAA >60 >60      phenobarb 25.8 .    CRP 58.1    procal 0.21  Lactic acid 1.3    Urine Studies:   Recent Labs   Lab 20  1451   COLORU Yellow   APPEARANCEUA Clear   PHUR 7.0   SPECGRAV 1.020   PROTEINUA 2+*   GLUCUA Negative   KETONESU Trace*   BILIRUBINUA Negative   OCCULTUA Trace*   NITRITE Negative   UROBILINOGEN Negative   LEUKOCYTESUR Negative   RBCUA 2   WBCUA 7*   BACTERIA None   HYALINECASTS 1     c diff negative .      Blood cultures NGTD- in process x 2     Strep and flu negative     Significant Imagin/4 CXR The lungs are clear, with normal appearance of pulmonary vasculature and no pleural effusion or pneumothorax.     The cardiac silhouette is normal in size. The hilar and mediastinal contours are unremarkable.     Bones are intact.     CTA chest     1. No evidence of acute cardiopulmonary embolus.  2. Still a zone of increased density identified in the patient's posterior gutter on the left 12 mm in size may reflect a early infiltrate or suspicious pulmonary nodule formation.  On any matter, short-term interval follow-up within a 6 month interval to document stability imaging findings is advised based on the Fleischner criteria.  3. 6 mm cyst projecting in the posterior interpolar region of the left kidney.  4. Prominent distention of the esophagus with evidence of retention of fluid that may reflect findings as a manifestation of achalasia with scleroderma as there is of moderate tapering of the distal esophagus at the level of the gastroesophageal junction.        EKG Sinus  tachycardia  Otherwise normal ECG  When compared with ECG of 15-OCT-2019 18:43,  Nonspecific T wave abnormality now evident in Anterior leads      Assessment/Plan:      * Hypokalemia  Replace IV and via PEG today  Labs in AM  Likely due to severe diarrhea  telemetry  K+ better 2.2>3.3 after 5 >> 10meq IV riders in ER and 20meq po   Will repeat g tube this am 40meq po once this am      Leukocytosis  Differential broad  Low grade fevers  C. Diff possible  CXR clear  UA clear  Aspiration in differential    Ruled out PE with CTA  Hold antibx for now as lactic acid and procal is normal but low threshold to start if any decompensation overnight      Diarrhea of presumed infectious origin  Antibiotic associate diarrhea vs true C. Diff  C. Diff pending  If worsening clinical status will start PO Vanc for treatment; holding tonight as true clinical picture not yet clear    Check stool studies  Change NS to 1/2 ns with 40meq KCL    Tachypnea  Repeat COVID ordered; rapid negative but + cough, diarrhea, mild LFT elevation  CXR without infiltrates  D-dimer elevated so checking CTA for PE tonight. Will start heparin gtt if CTA positive  No antibx for now with normal procal but aspiration in differential as well so could add Zosyn if needed  8/5: POX 96% on RA and RR down to 18/min. Maybe dehydration/hypoK played a role here      Sinus tachycardia  NS 125cc/hr  Likely some dehydration from frequent diarrhea (very low muscle mass so I trust Cr less)  D-dimer elevated so assessing for PE as well  CTA negative PE    Cerebral palsy  Cont home phenobarbital and baclofen  Cont tube feeds per home routine via PEG        VTE Risk Mitigation (From admission, onward)         Ordered     IP VTE LOW RISK PATIENT  Once      08/04/20 8712                      Anjelica Brandt MD  Department of Hospital Medicine   [facility]

## 2020-08-05 NOTE — ASSESSMENT & PLAN NOTE
Replace IV and via PEG today  Labs in AM  Likely due to severe diarrhea  telemetry  K+ better 2.2>3.3 after 5 >> 10meq IV riders in ER and 20meq po   Will repeat g tube this am 40meq po once this am

## 2020-08-05 NOTE — ASSESSMENT & PLAN NOTE
Antibiotic associate diarrhea vs true C. Diff  C. Diff pending  If worsening clinical status will start PO Vanc for treatment; holding tonight as true clinical picture not yet clear    Check stool studies  Change NS to 1/2 ns with 40meq KCL

## 2020-08-05 NOTE — HOSPITAL COURSE
CTA done this am negative for PE. POX 96% RR 18. He is still tachycardiac. With low grade fever. And diarrhea persist. K+ is better after 5-10meq KCL riders and 20meq via g tube.     8/6: COVID negative. Still having diarrhea. Still tachycardia (rate 120s and regular); mom doesn't know if this is his baseline. RR back to normal. He is more alert and interactive with TV. K is normal. Stool studies still pending. C. Diff negative.     8/7/2020 Blood Cx negative x 3d- but WBC trending up WBC 12.08> 14.45, will add Cipro , repeat CXR   Patient had 3 loose stools last shift/voiding per diaper. Stool was sent to lab for ova and parasites as ordered, c-diff negative    K+ 4.2   Getting IVFs @ 150ml/hr , HR Remains 114 sinus tach    8/8  Fever overnight. More agitated. HR up to 150s. Given propranolol and HR down to 100s. Less agitated. Slept okay. Still with foul smelling stool.    8/9  Switched from cipro to levaquin to cover for potential right lower lobe developing infiltrate. However, last night, he had higher fever. Discussed at length with mom that he has been on >10 rounds of different antibiotics over the last 2 months. Not always with a definitive diagnosis. Currently, he is still having foul smelling abnormally colored stools. He is non verbal and does not grimace with movement of his arms or legs or head. He also is unphased by abdominal exam.    8/10/2020 Tmax 101.9 , FUO, ? Infectious diarrhea tx ; cipro changed to Levofloxacin day 3 to cover for ? RLL pneumonia - but cxr not convincing,   Still getting flagyl. Switched to vanc and since, no fever, no diarrhea. Long discussion regarding ~11 abx in the past 2 months, has never been on Vancomycin  Did total body exam to rule out possible skin soft tissue etiology;   ? Echo pending;   BP stable, -116 , afebrile this am so some improvement with Vanc-   Resp infection panel  and flu swab negative, Blood Cx - NGTD x 2 d , repeat Covid negative, C- diff  negative     8/11/2020 Tmax 103, 100.7 this am , multiple watery stools overnight, started yesterday afternoon and now has stage 2 buttock wound  Echo with normal EF60%, no mention of valvular abn but  Overall the study quality was poor. The study was difficult due to patient's uncooperativeness, clinical status and poor endocardial visualization.   He gets ensure plus feedings  at home and getting Isosource here; could be source of diarrhea ? But has fever also   Getting Vanc and flagyl ; day 3 vanc, day 4 levofloxacin/flagyl   Multiple stool negative for rotavirus, O&P negative   Abdomen is non tender but - Will CT abd and pelvis; if negative then will need transfer for ID/GI   This was discussed with mother     8/12 CT of abd pelivs yesterday suggestive of colitis and Subtle ground-glass opacity in the right lung base which could represent a developing inflammatory/infectious process or sequela of aspiration.   Started on Vanc per Peg tube, flagyl day 5 , Tmax 99.3 , did have some blood stools , tube feeding on hold , will resume in a few days  Now getting IV D5 1/2 with KCL 20meq   day 5  Levofloxacin for pneumonia ,   Turning q2 for buttocks with breakdown from recurrent diarrhea, moisture barrier applied   Noted to have low BP early this am; given 250ml IVF bolus , BP currently 118/70,  , o2 sat stable on RA    8/13 Day 2 of Vanc per peg , day 6 flagyl for colitis,  Day 6 Levofloxacin per PEG for Pneumonia  Tmax 99.4,  WBC finally trending down   Hold Peg feedings, getting IVFs , still having lots of diarrhea but getting better     8/14/20   Glen Moscoso is a 24 y.o. male  Who has cerebral palsy and is non-verbal.contractures ;s/p PEG tube .     Day 3 /10 Vanc per PEG, day 7/10 flagyl for colitis,  Day 7/ Levofloxacin per PEG for Pneumonia- will get CXR if improved can stop levofloxacin ( finished 7 days )  Now afebrile and normal WBC but, Still having mucoid/bloody diarrhea, K+ 3.6>3.4>2.9 ;   Getting  D51/2 with KCL, Tube feedings on hold .    Add lactobacillus   Condition discussed with mother- possible d/c on Monday       8/15/20  CXR : clear lungs   DC Levaquin   Afebrile but diarrhea persists   Rash abdomen ; will try triamcinolone     8/16/20  Afebrile ;  colitis being treated with vancomycin  And flagyl .  Cholestyramine added yesterday .     But diarrhea with blood continues  Rash in buttocks     8/17/20  He had 2 BM over last 24hr. No bloody. Feeds have also been on hold. Will resume today. Low grade fever 99.7. No elevated WBC. K 3.1 today  Hoping for no diarrhea ; DC home in am     8/18 He has resumed feeds yesterday. Had 3 BM after isosource but when given ensure plus which is his usual feed and only had 2 BM. None were bloody. We do not have ensure on formulary here. K is stable this am. He is stable and his mother is ready for d/c

## 2020-08-05 NOTE — CONSULTS
"  [facility]  Adult Nutrition  Consult Note    SUMMARY     Recommendations    Recommendation:   1. Isosource 1.5 Bolus Feeds @ 4 cans/day to provide 1500 kcals, 68 g pro, and 764 mL enteral fluid       A. Fluid flush of 160 mL q6hrs or per MD   2. RD to monitor    Interventions:  Enteral nutrition  Collaboration with other providers    Goals: tolerate bolus feeding at goal by f/u  Nutrition Goal Status: new  Communication of RD Recs: reviewed with RN    Reason for Assessment    Reason For Assessment: consult  Diagnosis: (Hypokalemia)  Relevant Medical History: acid reflux, cerebral palsy, PEG tube, seizures    General Information Comments: Consult for PEG recs. No signs of significant weight loss at this time. He has cerebral palsy. Loose watery stools 3-4x a day for almost 2 weeks now. PEG in place, home feedings of Ensure 4x/d through PEG. Nurse stated they having been using Boost Plus for feeding since admit yesterday afternoon. NFPE not completed today 2/2 to patient being tested for COVID-19; will f/u as appropriate.    Nutrition Discharge Planning: nutrition through PEG    Nutrition Risk Screen    Nutrition Risk Screen: tube feeding or parenteral nutrition    Nutrition/Diet History    Patient Reported Diet/Restrictions/Preferences: no oral intake  Food Allergies: NKFA  Factors Affecting Nutritional Intake: NPO  Nutrition Support Formula Prior to Admit: Other (see comments)(Ensure)  Nutrtion Support Frequency Prior to Admit: 4 cartons/day    Anthropometrics    Temp: 100 °F (37.8 °C)  Height: 4' 9" (144.8 cm)  Height (inches): 57 in  Weight Method: Bed Scale  Weight: 35.4 kg (78 lb 0.7 oz)  Weight (lb): 78.04 lb  Ideal Body Weight (IBW), Male: 88 lb  % Ideal Body Weight, Male (lb): 88.68 %  BMI (Calculated): 16.9  BMI Grade: 16 - 16.9 protein-energy malnutrition grade II       Lab/Procedures/Meds    Pertinent Labs Reviewed: reviewed  Pertinent Labs Comments: potassium 3.3, albumin 2.5, ALT " 47  Pertinent Medications Reviewed: reviewed  Pertinent Medications Comments: NS @ 125 mL/hr    Estimated/Assessed Needs    Weight Used For Calorie Calculations: 35.4 kg (78 lb)  Energy Calorie Requirements (kcal): 1400  Energy Need Method: [place]-St Jeor(*1.2)  Protein Requirements: 35-42 g(1-1.2 g/kg)  Weight Used For Protein Calculations: 35.4 kg (78 lb)     Estimated Fluid Requirement Method: RDA Method  RDA Method (mL): 1400     Nutrition Prescription Ordered    Current Diet Order: NPO    Evaluation of Received Nutrient/Fluid Intake    IV Fluid (mL): (NS @ 125 mL/hr)  I/O: +3.5 L since admit  Energy Calories Required: not meeting needs  Protein Required: not meeting needs  Fluid Required: meeting needs  Comments: LBM 8/5    Nutrition Risk    Level of Risk/Frequency of Follow-up: moderate - high     Assessment and Plan  Nutrition Problem:  Inadequate oral intake    Related to (etiology):   Cerebral palsy    Signs and Symptoms (as evidenced by):   Nutrition through PEG    Interventions:  Enteral nutrition  Collaboration with other providers    Nutrition Diagnosis Status:   New     Monitor and Evaluation    Food and Nutrient Intake: enteral nutrition intake, energy intake  Food and Nutrient Adminstration: enteral and parenteral nutrition administration  Anthropometric Measurements: weight, weight change  Biochemical Data, Medical Tests and Procedures: electrolyte and renal panel, gastrointestinal profile, glucose/endocrine profile, lipid profile, inflammatory profile  Nutrition-Focused Physical Findings: overall appearance     Malnutrition Assessment  Malnutrition Type: (NFPE not completed 2/2 COVID testing be done, no signs of malnutrition at this time)        Nutrition Follow-Up    RD Follow-up?: Yes

## 2020-08-05 NOTE — ASSESSMENT & PLAN NOTE
Repeat COVID ordered; rapid negative but + cough, diarrhea, mild LFT elevation  CXR without infiltrates  D-dimer elevated so checking CTA for PE tonight. Will start heparin gtt if CTA positive  No antibx for now with normal procal but aspiration in differential as well so could add Zosyn if needed  8/5: POX 96% on RA and RR down to 18/min. Maybe dehydration/hypoK played a role here

## 2020-08-05 NOTE — SUBJECTIVE & OBJECTIVE
Review of Systems   Unable to perform ROS: Patient nonverbal     Objective:     Vital Signs (Most Recent):  Temp: 98.4 °F (36.9 °C) (08/05/20 0832)  Pulse: (!) 129 (08/05/20 0851)  Resp: 20 (08/05/20 0832)  BP: (!) 134/93 (08/05/20 0832)  SpO2: 96 % (08/05/20 0832) Vital Signs (24h Range):  Temp:  [96.8 °F (36 °C)-100.7 °F (38.2 °C)] 98.4 °F (36.9 °C)  Pulse:  [] 129  Resp:  [18-38] 20  SpO2:  [93 %-97 %] 96 %  BP: (121-158)/(66-93) 134/93     Weight: 35.4 kg (78 lb 0.7 oz)  Body mass index is 16.89 kg/m².    Physical Exam  Vitals signs and nursing note reviewed.   Constitutional:       Appearance: He is well-developed.   HENT:      Head: Normocephalic and atraumatic.      Right Ear: External ear normal.      Left Ear: External ear normal.      Nose: Nose normal.   Eyes:      General:         Right eye: No discharge.         Left eye: No discharge.      Conjunctiva/sclera: Conjunctivae normal.      Pupils: Pupils are equal, round, and reactive to light.      Comments: Strabismus right eye   Neck:      Musculoskeletal: Neck supple.      Thyroid: No thyromegaly.   Cardiovascular:      Rate and Rhythm: Regular rhythm.      Heart sounds: No murmur. No friction rub. No gallop.       Comments: Tachycardia, regular  Pulmonary:      Effort: Pulmonary effort is normal. No respiratory distress.      Breath sounds: Normal breath sounds. No wheezing or rales.   Abdominal:      General: Abdomen is flat. Bowel sounds are normal. There is no distension.      Palpations: Abdomen is soft.      Tenderness: There is no abdominal tenderness.      Comments: PEG in place with mild redness, no drainage   Musculoskeletal:      Right lower leg: No edema.      Left lower leg: No edema.      Comments: Muscle atrophy  contractures   Lymphadenopathy:      Cervical: No cervical adenopathy.   Skin:     General: Skin is warm and dry.   Neurological:      Mental Status: He is alert.      Comments: Patient alert but otherwise not responsive  and can not cooperate in exam, severe CP           CRANIAL NERVES     CN III, IV, VI   Pupils are equal, round, and reactive to light.       Significant Labs: covid screen negative-- repeat pending   CBC:   Recent Labs   Lab 20  1215 20  0624   WBC 15.67* 10.74   HGB 13.6* 12.0*   HCT 42.2 37.4*   * 298     CMP:   Recent Labs   Lab 20  1216 20  0624    140   K 2.2* 3.3*    104   CO2 29 28   * 100   BUN 21* 12   CREATININE 0.7 0.6   CALCIUM 8.4* 7.8*   PROT 8.0 6.4   ALBUMIN 3.1* 2.5*   BILITOT 0.3 0.4   ALKPHOS 93 83   AST 46* 35   ALT 60* 47*   ANIONGAP 13 8   EGFRNONAA >60 >60      phenobarb 25.8 .    CRP 58.1    procal 0.21  Lactic acid 1.3    Urine Studies:   Recent Labs   Lab 20  1451   COLORU Yellow   APPEARANCEUA Clear   PHUR 7.0   SPECGRAV 1.020   PROTEINUA 2+*   GLUCUA Negative   KETONESU Trace*   BILIRUBINUA Negative   OCCULTUA Trace*   NITRITE Negative   UROBILINOGEN Negative   LEUKOCYTESUR Negative   RBCUA 2   WBCUA 7*   BACTERIA None   HYALINECASTS 1     c diff negative .      Blood cultures NGTD- in process x 2     Strep and flu negative     Significant Imagin/4 CXR The lungs are clear, with normal appearance of pulmonary vasculature and no pleural effusion or pneumothorax.     The cardiac silhouette is normal in size. The hilar and mediastinal contours are unremarkable.     Bones are intact.     CTA chest     1. No evidence of acute cardiopulmonary embolus.  2. Still a zone of increased density identified in the patient's posterior gutter on the left 12 mm in size may reflect a early infiltrate or suspicious pulmonary nodule formation.  On any matter, short-term interval follow-up within a 6 month interval to document stability imaging findings is advised based on the Fleischner criteria.  3. 6 mm cyst projecting in the posterior interpolar region of the left kidney.  4. Prominent distention of the esophagus with evidence of  retention of fluid that may reflect findings as a manifestation of achalasia with scleroderma as there is of moderate tapering of the distal esophagus at the level of the gastroesophageal junction.       8/5 EKG Sinus tachycardia  Otherwise normal ECG  When compared with ECG of 15-OCT-2019 18:43,  Nonspecific T wave abnormality now evident in Anterior leads

## 2020-08-05 NOTE — PROGRESS NOTES
Nursing Notes  Report given to Radha SIEGEL.  Mother at bedside.  Patient stable.      Huddle Comments

## 2020-08-05 NOTE — PLAN OF CARE
08/05/20 1426   Advance Directives (For Healthcare)   Advance Directive  (If Adv Dir status is received, view document under Adv Dir in header or Chart Review Media tab) Patient does not have Advance Directive, declines information.

## 2020-08-05 NOTE — PROGRESS NOTES
Mrs. Moscoso received medication education due to the nonverbal condition of Mr. Moscoso. She reports that they have been informed of all meds being given. We discussed pain control and fall precautions. She reports that Glen does moan occasionally but is uncertain if he's having pain. She had no further questions or concerns regarding the medications and elects bedside delivery of meds upon discharge.    Briana Warren, PharmD   [phone]

## 2020-08-05 NOTE — PLAN OF CARE
Plan of care discussed with patient's mother.  Patient's mother verbalizes understanding of plan of care. Pt compliant with POC. VSS, NADN, will continue to monitor. Pt remains free from falls or injury. IV fluids given. COVID precautions maintained. Cardiac monitoring continued. Isosource used for bolus feedings. Medications given through PEG tube. Patient had a CTA today. Patient's mother at the bedside.

## 2020-08-05 NOTE — PLAN OF CARE
08/05/20 1427   Discharge Assessment   Assessment Type Discharge Planning Assessment   Confirmed/corrected address and phone number on facesheet? Yes   Assessment information obtained from? Caregiver  (mother)   Expected Length of Stay (days) 2   Communicated expected length of stay with patient/caregiver yes   Prior to hospitilization cognitive status: Unable to Assess  (nonverbal and total care CP patient)   Prior to hospitalization functional status: Completely Dependent   Current cognitive status: Unable to Assess   Current Functional Status: Completely Dependent   Facility Arrived From: home   Lives With parent(s)  (lvies with mother)   Able to Return to Prior Arrangements yes   Is patient able to care for self after discharge? No   Who are your caregiver(s) and their phone number(s)? [phone]   Patient's perception of discharge disposition home or selfcare   Readmission Within the Last 30 Days no previous admission in last 30 days   Patient currently receives any other outside agency services? No   Equipment Currently Used at Home wheelchair;hospital bed;nebulizer   Do you have any problems affording any of your prescribed medications? No   Is the patient taking medications as prescribed? yes   Does the patient have transportation home? No   Does the patient receive services at the [facility]? No   Discharge Plan A Home Health  (currently a patient of [email or web address])   Discharge Plan B Home with family   DME Needed Upon Discharge  none  (mother states she is waiting for a lift device from [facility])   Patient/Family in Agreement with Plan yes     Glen is here with fever and hypokalemia. He will remain here for potassium replacement. He is total care cerebral palsy. His mother is here with him. She states they will resume care with [facility] at discharge. She states he has all the equipment he needs. CM to follow daily for changes.

## 2020-08-05 NOTE — ASSESSMENT & PLAN NOTE
Differential broad  Low grade fevers  C. Diff possible  CXR clear  UA clear  Aspiration in differential    Ruled out PE with CTA  Hold antibx for now as lactic acid and procal is normal but low threshold to start if any decompensation overnight

## 2020-08-05 NOTE — PROGRESS NOTES
Nursing Notes    Walking rounding.  Report received from Nery Alexander RN.  Patient awake.  Mother at bedside.  Suction equipment available.       Huddle Comments

## 2020-08-05 NOTE — ASSESSMENT & PLAN NOTE
NS 125cc/hr  Likely some dehydration from frequent diarrhea (very low muscle mass so I trust Cr less)  D-dimer elevated so assessing for PE as well  CTA negative PE

## 2020-08-05 NOTE — PLAN OF CARE
Plan of Care discussed with mother in presence of patient at start of shift and during care at bedside. NPO.  Button PEG in place.   Boost Plus given as POC glucose 97.  Asymptomatic.  Telemetry tachycardia. Suction set-up at bedside.  Required Yankauer suction x1 for oral throat mucus.  Repositioning with assist x2.  Able to move self sideways in bed.  Head lag on moving up in bed.  Head support needed.   Incontinent of urine and stool.  Mother at bedside during shift.  Potassium Chloride IVPB administered every 2 hours x4.  IV normal saline at 125 mL/hr.  Site intact.  No falls this shift.  Afebrile.

## 2020-08-05 NOTE — PLAN OF CARE
Recommendation:   1. Isosource 1.5 Bolus Feeds @ 4 cans/day to provide 1500 kcals, 68 g pro, and 764 mL enteral fluid       A. Fluid flush of 160 mL q6hrs or per MD   2. RD to monitor    Interventions:  Enteral nutrition  Collaboration with other providers    Goals: tolerate bolus feeding at goal by f/u  Nutrition Goal Status: new  Nutrition Discharge Planning: nutrition through PEG

## 2020-08-06 LAB
ALBUMIN SERPL BCP-MCNC: 2.8 G/DL (ref 3.5–5.2)
ALP SERPL-CCNC: 96 U/L (ref 55–135)
ALT SERPL W/O P-5'-P-CCNC: 40 U/L (ref 10–44)
ANION GAP SERPL CALC-SCNC: 9 MMOL/L (ref 8–16)
AST SERPL-CCNC: 30 U/L (ref 10–40)
BASOPHILS # BLD AUTO: 0.03 K/UL (ref 0–0.2)
BASOPHILS NFR BLD: 0.2 % (ref 0–1.9)
BILIRUB SERPL-MCNC: 0.5 MG/DL (ref 0.1–1)
BUN SERPL-MCNC: 6 MG/DL (ref 6–20)
CALCIUM SERPL-MCNC: 8.4 MG/DL (ref 8.7–10.5)
CHLORIDE SERPL-SCNC: 101 MMOL/L (ref 95–110)
CO2 SERPL-SCNC: 27 MMOL/L (ref 23–29)
CREAT SERPL-MCNC: 0.6 MG/DL (ref 0.5–1.4)
DIFFERENTIAL METHOD: ABNORMAL
EOSINOPHIL # BLD AUTO: 1.3 K/UL (ref 0–0.5)
EOSINOPHIL NFR BLD: 10.5 % (ref 0–8)
ERYTHROCYTE [DISTWIDTH] IN BLOOD BY AUTOMATED COUNT: 13.4 % (ref 11.5–14.5)
EST. GFR  (AFRICAN AMERICAN): >60 ML/MIN/1.73 M^2
EST. GFR  (NON AFRICAN AMERICAN): >60 ML/MIN/1.73 M^2
GLUCOSE SERPL-MCNC: 91 MG/DL (ref 70–110)
HCT VFR BLD AUTO: 37.9 % (ref 40–54)
HGB BLD-MCNC: 12.1 G/DL (ref 14–18)
IMM GRANULOCYTES # BLD AUTO: 0.07 K/UL (ref 0–0.04)
IMM GRANULOCYTES NFR BLD AUTO: 0.6 % (ref 0–0.5)
LYMPHOCYTES # BLD AUTO: 1.7 K/UL (ref 1–4.8)
LYMPHOCYTES NFR BLD: 14.4 % (ref 18–48)
MAGNESIUM SERPL-MCNC: 2 MG/DL (ref 1.6–2.6)
MCH RBC QN AUTO: 28.5 PG (ref 27–31)
MCHC RBC AUTO-ENTMCNC: 31.9 G/DL (ref 32–36)
MCV RBC AUTO: 89 FL (ref 82–98)
MONOCYTES # BLD AUTO: 0.8 K/UL (ref 0.3–1)
MONOCYTES NFR BLD: 7 % (ref 4–15)
NEUTROPHILS # BLD AUTO: 8.1 K/UL (ref 1.8–7.7)
NEUTROPHILS NFR BLD: 67.3 % (ref 38–73)
NRBC BLD-RTO: 0 /100 WBC
PLATELET # BLD AUTO: 309 K/UL (ref 150–350)
PMV BLD AUTO: 10.2 FL (ref 9.2–12.9)
POCT GLUCOSE: 79 MG/DL (ref 70–110)
POCT GLUCOSE: 91 MG/DL (ref 70–110)
POCT GLUCOSE: 92 MG/DL (ref 70–110)
POCT GLUCOSE: 99 MG/DL (ref 70–110)
POTASSIUM SERPL-SCNC: 4.6 MMOL/L (ref 3.5–5.1)
PROT SERPL-MCNC: 6.8 G/DL (ref 6–8.4)
RBC # BLD AUTO: 4.24 M/UL (ref 4.6–6.2)
SODIUM SERPL-SCNC: 137 MMOL/L (ref 136–145)
WBC # BLD AUTO: 12.08 K/UL (ref 3.9–12.7)
WBC #/AREA STL HPF: NORMAL /[HPF]

## 2020-08-06 PROCEDURE — 99232 SBSQ HOSP IP/OBS MODERATE 35: CPT | Mod: ,,, | Performed by: INTERNAL MEDICINE

## 2020-08-06 PROCEDURE — 36415 COLL VENOUS BLD VENIPUNCTURE: CPT

## 2020-08-06 PROCEDURE — 83735 ASSAY OF MAGNESIUM: CPT

## 2020-08-06 PROCEDURE — 25000003 PHARM REV CODE 250: Performed by: INTERNAL MEDICINE

## 2020-08-06 PROCEDURE — 85025 COMPLETE CBC W/AUTO DIFF WBC: CPT

## 2020-08-06 PROCEDURE — 25000003 PHARM REV CODE 250: Performed by: NURSE PRACTITIONER

## 2020-08-06 PROCEDURE — 99232 PR SUBSEQUENT HOSPITAL CARE,LEVL II: ICD-10-PCS | Mod: ,,, | Performed by: INTERNAL MEDICINE

## 2020-08-06 PROCEDURE — 80053 COMPREHEN METABOLIC PANEL: CPT

## 2020-08-06 PROCEDURE — 63600175 PHARM REV CODE 636 W HCPCS: Performed by: NURSE PRACTITIONER

## 2020-08-06 PROCEDURE — 11000001 HC ACUTE MED/SURG PRIVATE ROOM

## 2020-08-06 PROCEDURE — 94761 N-INVAS EAR/PLS OXIMETRY MLT: CPT

## 2020-08-06 RX ORDER — SODIUM CHLORIDE 9 MG/ML
INJECTION, SOLUTION INTRAVENOUS CONTINUOUS
Status: DISCONTINUED | OUTPATIENT
Start: 2020-08-06 | End: 2020-08-07

## 2020-08-06 RX ADMIN — GABAPENTIN 250 MG: 250 SUSPENSION ORAL at 09:08

## 2020-08-06 RX ADMIN — BACLOFEN 10 MG: 10 TABLET ORAL at 09:08

## 2020-08-06 RX ADMIN — POTASSIUM CHLORIDE 125 ML/HR: 2 INJECTION, SOLUTION, CONCENTRATE INTRAVENOUS at 08:08

## 2020-08-06 RX ADMIN — SODIUM CHLORIDE: 0.9 INJECTION, SOLUTION INTRAVENOUS at 07:08

## 2020-08-06 RX ADMIN — SODIUM CHLORIDE: 0.9 INJECTION, SOLUTION INTRAVENOUS at 12:08

## 2020-08-06 RX ADMIN — GABAPENTIN 250 MG: 250 SUSPENSION ORAL at 05:08

## 2020-08-06 RX ADMIN — BACLOFEN 10 MG: 10 TABLET ORAL at 07:08

## 2020-08-06 RX ADMIN — POTASSIUM CHLORIDE 125 ML/HR: 2 INJECTION, SOLUTION, CONCENTRATE INTRAVENOUS at 12:08

## 2020-08-06 RX ADMIN — PHENOBARBITAL 100 MG: 20 ELIXIR ORAL at 09:08

## 2020-08-06 RX ADMIN — BACLOFEN 10 MG: 10 TABLET ORAL at 03:08

## 2020-08-06 NOTE — ASSESSMENT & PLAN NOTE
Cont home phenobarbital and baclofen  Cont tube feeds per home routine via PEG  He is non-verbal and not mobile at baseline. He seems to be more interactive and grunting at TV today. Mom thinks this is his baseline

## 2020-08-06 NOTE — PROGRESS NOTES
"[facility] Medicine  Progress Note    Patient Name: Glen Moscoso  MRN: [id number]  Patient Class: IP- Inpatient   Admission Date: 8/4/2020  Length of Stay: 1 days  Attending Physician: Anjelica Brandt MD  Primary Care Provider: Yadi Lawson MD        Subjective:     Principal Problem:Hypokalemia        HPI:  Patient presented to ER with fever and cough. Patient has cerebral palsy and is non-verbal. Mother provides history. He was seen at [facility] ER 2 weeks ago and diagnosed with bronchitis and strep throat. Treated with amoxil. He completed antibx 4 days ago. Mom reports loose watery stools 3-4x a day for almost 2 weeks now. She noted he had fever yesterday "He was burning up" but did not check with thermometer. She notes he is breathing a little heavier than normal. + cough. She does not think he is in any pain. No changes to tube feeds (Ensure 4x a day via PEG). No sick contacts or recent travel but was in [facility] ER and was hospitalized at [place] before that for "aspiration" and on antibx then. Patient has low grade temp 99.5. HR 120s. RR 30s. Sats 96% on RA. Rapid COVID negative however note mild elevation LFTs, LDH, ferritin pending. D-dimer elevated, CTA pending. Lactic acid and procalcitonin normal. Repeat COVID swab ordered. WBC 15K. UA not infectious. K 2.2--likely due to diarrhea. GFR > 60 (Cr 0.7 but very low muscle mass). CXR clear. Flu and strep negative. Admitted for treatment of hypokalemia and possible C. Diff vs COVID vs PE.     Overview/Hospital Course:  CTA done this am negative for PE. POX 96% RR 18. He is still tachycardiac. With low grade fever. And diarrhea persist. K+ is better after 5-10meq KCL riders and 20meq via g tube.     8/6: COVID negative. Still having diarrhea. Still tachycardia (rate 120s and regular); mom doesn't know if this is his baseline. RR back to normal. He is more alert and interactive with TV. K is normal. Stool studies still pending. C. Diff " negative.       Review of Systems   Unable to perform ROS: Patient nonverbal     Objective:     Vital Signs (Most Recent):  Temp: 98.5 °F (36.9 °C) (08/06/20 1626)  Pulse: (!) 121 (08/06/20 1626)  Resp: 20 (08/06/20 1626)  BP: 123/81 (08/06/20 1626)  SpO2: 99 % (08/06/20 1626) Vital Signs (24h Range):  Temp:  [97 °F (36.1 °C)-99.5 °F (37.5 °C)] 98.5 °F (36.9 °C)  Pulse:  [111-141] 121  Resp:  [16-22] 20  SpO2:  [95 %-99 %] 99 %  BP: (116-157)/() 123/81     Weight: 35.4 kg (78 lb 0.7 oz)  Body mass index is 16.89 kg/m².    Physical Exam  Vitals signs and nursing note reviewed.   Constitutional:       Appearance: He is well-developed.   HENT:      Head: Normocephalic and atraumatic.      Right Ear: External ear normal.      Left Ear: External ear normal.      Nose: Nose normal.   Eyes:      General:         Right eye: No discharge.         Left eye: No discharge.      Conjunctiva/sclera: Conjunctivae normal.      Pupils: Pupils are equal, round, and reactive to light.      Comments: Strabismus right eye   Neck:      Musculoskeletal: Neck supple.      Thyroid: No thyromegaly.   Cardiovascular:      Rate and Rhythm: Regular rhythm.      Heart sounds: No murmur. No friction rub. No gallop.       Comments: Tachycardia, regular  Pulmonary:      Effort: Pulmonary effort is normal. No respiratory distress.      Breath sounds: Normal breath sounds. No wheezing or rales.   Abdominal:      General: Abdomen is flat. Bowel sounds are normal. There is no distension.      Palpations: Abdomen is soft.      Tenderness: There is no abdominal tenderness.      Comments: PEG in place with mild redness, no drainage   Musculoskeletal:      Right lower leg: No edema.      Left lower leg: No edema.      Comments: Muscle atrophy  contractures   Lymphadenopathy:      Cervical: No cervical adenopathy.   Skin:     General: Skin is warm and dry.   Neurological:      Mental Status: He is alert.      Comments: Patient alert but otherwise not  responsive and can not cooperate in exam, severe CP           CRANIAL NERVES     CN III, IV, VI   Pupils are equal, round, and reactive to light.       Significant Labs: covid screen negative-- repeat pending   CBC:   Recent Labs   Lab 20  0641   WBC 10.74 12.08   HGB 12.0* 12.1*   HCT 37.4* 37.9*    309     CMP:   Recent Labs   Lab 20  0641    137   K 3.3* 4.6    101   CO2 28 27    91   BUN 12 6   CREATININE 0.6 0.6   CALCIUM 7.8* 8.4*   PROT 6.4 6.8   ALBUMIN 2.5* 2.8*   BILITOT 0.4 0.5   ALKPHOS 83 96   AST 35 30   ALT 47* 40   ANIONGAP 8 9   EGFRNONAA >60 >60      phenobarb 25.8 .    CRP 58.1    procal 0.21  Lactic acid 1.3    Urine Studies:   No results for input(s): COLORU, APPEARANCEUA, PHUR, SPECGRAV, PROTEINUA, GLUCUA, KETONESU, BILIRUBINUA, OCCULTUA, NITRITE, UROBILINOGEN, LEUKOCYTESUR, RBCUA, WBCUA, BACTERIA, SQUAMEPITHEL, HYALINECASTS in the last 48 hours.    Invalid input(s): WRIGHTSUR  c diff negative .      Blood cultures NGTD- in process x 2     Strep and flu negative     Significant Imagin/4 CXR The lungs are clear, with normal appearance of pulmonary vasculature and no pleural effusion or pneumothorax.     The cardiac silhouette is normal in size. The hilar and mediastinal contours are unremarkable.     Bones are intact.     CTA chest     1. No evidence of acute cardiopulmonary embolus.  2. Still a zone of increased density identified in the patient's posterior gutter on the left 12 mm in size may reflect a early infiltrate or suspicious pulmonary nodule formation.  On any matter, short-term interval follow-up within a 6 month interval to document stability imaging findings is advised based on the Fleischner criteria.  3. 6 mm cyst projecting in the posterior interpolar region of the left kidney.  4. Prominent distention of the esophagus with evidence of retention of fluid that may reflect findings as a  manifestation of achalasia with scleroderma as there is of moderate tapering of the distal esophagus at the level of the gastroesophageal junction.       8/5 EKG Sinus tachycardia  Otherwise normal ECG  When compared with ECG of 15-OCT-2019 18:43,  Nonspecific T wave abnormality now evident in Anterior leads      Assessment/Plan:      * Hypokalemia  Replace IV and via PEG today  Labs in AM  Likely due to severe diarrhea  telemetry  K+ better 2.2>3.3 after 5 >> 10meq IV riders in ER and 20meq po   Will repeat g tube this am 40meq po once this am    8/6: resolved. Stop K in fluids today. Still having diarrhea so will keep 1 more day and repeat labs in morning to be sure remains stable as this was his main reason for admission      Leukocytosis  Differential broad  Low grade fevers  C. Diff possible  CXR clear  UA clear  Aspiration in differential    Ruled out PE with CTA  Hold antibx for now as lactic acid and procal is normal but low threshold to start if any decompensation overnight    8/6: resolved without antibx treatment. Could be dehydration.       Diarrhea of presumed infectious origin  Antibiotic associate diarrhea vs true C. Diff  C. Diff pending  If worsening clinical status will start PO Vanc for treatment; holding tonight as true clinical picture not yet clear    8/6:  Check stool studies--pending. C. Diff negative  Change NS 150cc/hr    Tachypnea  Repeat COVID ordered; rapid negative but + cough, diarrhea, mild LFT elevation  CXR without infiltrates  D-dimer elevated so checking CTA for PE tonight. Will start heparin gtt if CTA positive  No antibx for now with normal procal but aspiration in differential as well so could add Zosyn if needed  8/5: POX 96% on RA and RR down to 18/min. Maybe dehydration/hypoK played a role here    8/6: resolved. COVID negative. RR 18 from 30-40 on admit.       Sinus tachycardia  NS 10cc/hr  Likely some dehydration from frequent diarrhea (very low muscle mass so I trust Cr  less)  D-dimer elevated so assessing for PE as well  CTA negative PE    Mom unsure his baseline heart rate.     Cerebral palsy  Cont home phenobarbital and baclofen  Cont tube feeds per home routine via PEG  He is non-verbal and not mobile at baseline. He seems to be more interactive and grunting at TV today. Mom thinks this is his baseline        VTE Risk Mitigation (From admission, onward)         Ordered     IP VTE LOW RISK PATIENT  Once      08/04/20 1931                      Anjelica Brandt MD  Department of Hospital Medicine   [facility]

## 2020-08-06 NOTE — ASSESSMENT & PLAN NOTE
Repeat COVID ordered; rapid negative but + cough, diarrhea, mild LFT elevation  CXR without infiltrates  D-dimer elevated so checking CTA for PE tonight. Will start heparin gtt if CTA positive  No antibx for now with normal procal but aspiration in differential as well so could add Zosyn if needed  8/5: POX 96% on RA and RR down to 18/min. Maybe dehydration/hypoK played a role here    8/6: resolved. COVID negative. RR 18 from 30-40 on admit.

## 2020-08-06 NOTE — SUBJECTIVE & OBJECTIVE
Review of Systems   Unable to perform ROS: Patient nonverbal     Objective:     Vital Signs (Most Recent):  Temp: 98.5 °F (36.9 °C) (08/06/20 1626)  Pulse: (!) 121 (08/06/20 1626)  Resp: 20 (08/06/20 1626)  BP: 123/81 (08/06/20 1626)  SpO2: 99 % (08/06/20 1626) Vital Signs (24h Range):  Temp:  [97 °F (36.1 °C)-99.5 °F (37.5 °C)] 98.5 °F (36.9 °C)  Pulse:  [111-141] 121  Resp:  [16-22] 20  SpO2:  [95 %-99 %] 99 %  BP: (116-157)/() 123/81     Weight: 35.4 kg (78 lb 0.7 oz)  Body mass index is 16.89 kg/m².    Physical Exam  Vitals signs and nursing note reviewed.   Constitutional:       Appearance: He is well-developed.   HENT:      Head: Normocephalic and atraumatic.      Right Ear: External ear normal.      Left Ear: External ear normal.      Nose: Nose normal.   Eyes:      General:         Right eye: No discharge.         Left eye: No discharge.      Conjunctiva/sclera: Conjunctivae normal.      Pupils: Pupils are equal, round, and reactive to light.      Comments: Strabismus right eye   Neck:      Musculoskeletal: Neck supple.      Thyroid: No thyromegaly.   Cardiovascular:      Rate and Rhythm: Regular rhythm.      Heart sounds: No murmur. No friction rub. No gallop.       Comments: Tachycardia, regular  Pulmonary:      Effort: Pulmonary effort is normal. No respiratory distress.      Breath sounds: Normal breath sounds. No wheezing or rales.   Abdominal:      General: Abdomen is flat. Bowel sounds are normal. There is no distension.      Palpations: Abdomen is soft.      Tenderness: There is no abdominal tenderness.      Comments: PEG in place with mild redness, no drainage   Musculoskeletal:      Right lower leg: No edema.      Left lower leg: No edema.      Comments: Muscle atrophy  contractures   Lymphadenopathy:      Cervical: No cervical adenopathy.   Skin:     General: Skin is warm and dry.   Neurological:      Mental Status: He is alert.      Comments: Patient alert but otherwise not responsive and  can not cooperate in exam, severe CP           CRANIAL NERVES     CN III, IV, VI   Pupils are equal, round, and reactive to light.       Significant Labs: covid screen negative-- repeat pending   CBC:   Recent Labs   Lab 20  0641   WBC 10.74 12.08   HGB 12.0* 12.1*   HCT 37.4* 37.9*    309     CMP:   Recent Labs   Lab 20  0641    137   K 3.3* 4.6    101   CO2 28 27    91   BUN 12 6   CREATININE 0.6 0.6   CALCIUM 7.8* 8.4*   PROT 6.4 6.8   ALBUMIN 2.5* 2.8*   BILITOT 0.4 0.5   ALKPHOS 83 96   AST 35 30   ALT 47* 40   ANIONGAP 8 9   EGFRNONAA >60 >60      phenobarb 25.8 .    CRP 58.1    procal 0.21  Lactic acid 1.3    Urine Studies:   No results for input(s): COLORU, APPEARANCEUA, PHUR, SPECGRAV, PROTEINUA, GLUCUA, KETONESU, BILIRUBINUA, OCCULTUA, NITRITE, UROBILINOGEN, LEUKOCYTESUR, RBCUA, WBCUA, BACTERIA, SQUAMEPITHEL, HYALINECASTS in the last 48 hours.    Invalid input(s): WRIGHTSUR  c diff negative .      Blood cultures NGTD- in process x 2     Strep and flu negative     Significant Imagin/4 CXR The lungs are clear, with normal appearance of pulmonary vasculature and no pleural effusion or pneumothorax.     The cardiac silhouette is normal in size. The hilar and mediastinal contours are unremarkable.     Bones are intact.     CTA chest     1. No evidence of acute cardiopulmonary embolus.  2. Still a zone of increased density identified in the patient's posterior gutter on the left 12 mm in size may reflect a early infiltrate or suspicious pulmonary nodule formation.  On any matter, short-term interval follow-up within a 6 month interval to document stability imaging findings is advised based on the Fleischner criteria.  3. 6 mm cyst projecting in the posterior interpolar region of the left kidney.  4. Prominent distention of the esophagus with evidence of retention of fluid that may reflect findings as a manifestation of  achalasia with scleroderma as there is of moderate tapering of the distal esophagus at the level of the gastroesophageal junction.       8/5 EKG Sinus tachycardia  Otherwise normal ECG  When compared with ECG of 15-OCT-2019 18:43,  Nonspecific T wave abnormality now evident in Anterior leads

## 2020-08-06 NOTE — PLAN OF CARE
Plan of care discussed with patient and mother. Mother verbalizes understanding of plan of care. Pt nonverbal, unable to assess level of understanding. Pt compliant with POC. NADN. Pt remains free from falls or injury. IV fluids administered. Cardiac monitoring continued. Covid precautions maintained. Will continue to monitor.

## 2020-08-06 NOTE — ASSESSMENT & PLAN NOTE
NS 10cc/hr  Likely some dehydration from frequent diarrhea (very low muscle mass so I trust Cr less)  D-dimer elevated so assessing for PE as well  CTA negative PE    Mom unsure his baseline heart rate.

## 2020-08-06 NOTE — ASSESSMENT & PLAN NOTE
Antibiotic associate diarrhea vs true C. Diff  C. Diff pending  If worsening clinical status will start PO Vanc for treatment; holding tonight as true clinical picture not yet clear    8/6:  Check stool studies--pending. C. Diff negative  Change NS 150cc/hr

## 2020-08-06 NOTE — ASSESSMENT & PLAN NOTE
Differential broad  Low grade fevers  C. Diff possible  CXR clear  UA clear  Aspiration in differential    Ruled out PE with CTA  Hold antibx for now as lactic acid and procal is normal but low threshold to start if any decompensation overnight    8/6: resolved without antibx treatment. Could be dehydration.

## 2020-08-07 LAB
ALBUMIN SERPL BCP-MCNC: 2.5 G/DL (ref 3.5–5.2)
ALP SERPL-CCNC: 90 U/L (ref 55–135)
ALT SERPL W/O P-5'-P-CCNC: 35 U/L (ref 10–44)
ANION GAP SERPL CALC-SCNC: 8 MMOL/L (ref 8–16)
AST SERPL-CCNC: 25 U/L (ref 10–40)
BASOPHILS # BLD AUTO: 0.03 K/UL (ref 0–0.2)
BASOPHILS NFR BLD: 0.2 % (ref 0–1.9)
BILIRUB SERPL-MCNC: 0.2 MG/DL (ref 0.1–1)
BUN SERPL-MCNC: 5 MG/DL (ref 6–20)
CALCIUM SERPL-MCNC: 8.1 MG/DL (ref 8.7–10.5)
CHLORIDE SERPL-SCNC: 104 MMOL/L (ref 95–110)
CO2 SERPL-SCNC: 28 MMOL/L (ref 23–29)
CREAT SERPL-MCNC: 0.5 MG/DL (ref 0.5–1.4)
DIFFERENTIAL METHOD: ABNORMAL
E COLI SXT1 STL QL IA: NEGATIVE
E COLI SXT2 STL QL IA: NEGATIVE
EOSINOPHIL # BLD AUTO: 1.3 K/UL (ref 0–0.5)
EOSINOPHIL NFR BLD: 9.1 % (ref 0–8)
ERYTHROCYTE [DISTWIDTH] IN BLOOD BY AUTOMATED COUNT: 13.3 % (ref 11.5–14.5)
EST. GFR  (AFRICAN AMERICAN): >60 ML/MIN/1.73 M^2
EST. GFR  (NON AFRICAN AMERICAN): >60 ML/MIN/1.73 M^2
GLUCOSE SERPL-MCNC: 99 MG/DL (ref 70–110)
HCT VFR BLD AUTO: 34.8 % (ref 40–54)
HGB BLD-MCNC: 11.2 G/DL (ref 14–18)
IMM GRANULOCYTES # BLD AUTO: 0.11 K/UL (ref 0–0.04)
IMM GRANULOCYTES NFR BLD AUTO: 0.8 % (ref 0–0.5)
LYMPHOCYTES # BLD AUTO: 1.7 K/UL (ref 1–4.8)
LYMPHOCYTES NFR BLD: 11.7 % (ref 18–48)
MCH RBC QN AUTO: 28.4 PG (ref 27–31)
MCHC RBC AUTO-ENTMCNC: 32.2 G/DL (ref 32–36)
MCV RBC AUTO: 88 FL (ref 82–98)
MONOCYTES # BLD AUTO: 0.9 K/UL (ref 0.3–1)
MONOCYTES NFR BLD: 6.1 % (ref 4–15)
NEUTROPHILS # BLD AUTO: 10.4 K/UL (ref 1.8–7.7)
NEUTROPHILS NFR BLD: 72.1 % (ref 38–73)
NRBC BLD-RTO: 0 /100 WBC
PLATELET # BLD AUTO: 316 K/UL (ref 150–350)
PMV BLD AUTO: 9.9 FL (ref 9.2–12.9)
POCT GLUCOSE: 81 MG/DL (ref 70–110)
POCT GLUCOSE: 86 MG/DL (ref 70–110)
POCT GLUCOSE: 90 MG/DL (ref 70–110)
POTASSIUM SERPL-SCNC: 4.2 MMOL/L (ref 3.5–5.1)
PROT SERPL-MCNC: 6.3 G/DL (ref 6–8.4)
RBC # BLD AUTO: 3.94 M/UL (ref 4.6–6.2)
SODIUM SERPL-SCNC: 140 MMOL/L (ref 136–145)
WBC # BLD AUTO: 14.45 K/UL (ref 3.9–12.7)

## 2020-08-07 PROCEDURE — 85025 COMPLETE CBC W/AUTO DIFF WBC: CPT

## 2020-08-07 PROCEDURE — 11000001 HC ACUTE MED/SURG PRIVATE ROOM

## 2020-08-07 PROCEDURE — 80053 COMPREHEN METABOLIC PANEL: CPT

## 2020-08-07 PROCEDURE — 25000003 PHARM REV CODE 250: Performed by: FAMILY MEDICINE

## 2020-08-07 PROCEDURE — 87209 SMEAR COMPLEX STAIN: CPT

## 2020-08-07 PROCEDURE — 25000003 PHARM REV CODE 250: Performed by: INTERNAL MEDICINE

## 2020-08-07 PROCEDURE — 36415 COLL VENOUS BLD VENIPUNCTURE: CPT

## 2020-08-07 PROCEDURE — 93010 ELECTROCARDIOGRAM REPORT: CPT | Mod: ,,, | Performed by: INTERNAL MEDICINE

## 2020-08-07 PROCEDURE — 99232 SBSQ HOSP IP/OBS MODERATE 35: CPT | Mod: ,,, | Performed by: FAMILY MEDICINE

## 2020-08-07 PROCEDURE — 99232 PR SUBSEQUENT HOSPITAL CARE,LEVL II: ICD-10-PCS | Mod: ,,, | Performed by: FAMILY MEDICINE

## 2020-08-07 PROCEDURE — 94761 N-INVAS EAR/PLS OXIMETRY MLT: CPT

## 2020-08-07 PROCEDURE — 93010 EKG 12-LEAD: ICD-10-PCS | Mod: ,,, | Performed by: INTERNAL MEDICINE

## 2020-08-07 PROCEDURE — 94760 N-INVAS EAR/PLS OXIMETRY 1: CPT

## 2020-08-07 PROCEDURE — 93005 ELECTROCARDIOGRAM TRACING: CPT

## 2020-08-07 PROCEDURE — 25000003 PHARM REV CODE 250: Performed by: NURSE PRACTITIONER

## 2020-08-07 RX ORDER — GABAPENTIN 250 MG/5ML
250 SOLUTION ORAL NIGHTLY
Status: DISCONTINUED | OUTPATIENT
Start: 2020-08-07 | End: 2020-08-18 | Stop reason: HOSPADM

## 2020-08-07 RX ORDER — PROPRANOLOL HYDROCHLORIDE 10 MG/1
10 TABLET ORAL ONCE
Status: COMPLETED | OUTPATIENT
Start: 2020-08-07 | End: 2020-08-07

## 2020-08-07 RX ORDER — METRONIDAZOLE 500 MG/1
500 TABLET ORAL EVERY 8 HOURS
Status: DISCONTINUED | OUTPATIENT
Start: 2020-08-07 | End: 2020-08-18 | Stop reason: HOSPADM

## 2020-08-07 RX ORDER — CIPROFLOXACIN 500 MG/1
500 TABLET ORAL EVERY 12 HOURS
Status: DISCONTINUED | OUTPATIENT
Start: 2020-08-07 | End: 2020-08-08

## 2020-08-07 RX ADMIN — BACLOFEN 10 MG: 10 TABLET ORAL at 02:08

## 2020-08-07 RX ADMIN — PHENOBARBITAL 100 MG: 20 ELIXIR ORAL at 08:08

## 2020-08-07 RX ADMIN — BACLOFEN 10 MG: 10 TABLET ORAL at 08:08

## 2020-08-07 RX ADMIN — METRONIDAZOLE 500 MG: 500 TABLET ORAL at 02:08

## 2020-08-07 RX ADMIN — CIPROFLOXACIN 500 MG: 500 TABLET, FILM COATED ORAL at 08:08

## 2020-08-07 RX ADMIN — GABAPENTIN 250 MG: 250 SOLUTION ORAL at 08:08

## 2020-08-07 RX ADMIN — GABAPENTIN 250 MG: 250 SUSPENSION ORAL at 05:08

## 2020-08-07 RX ADMIN — METRONIDAZOLE 500 MG: 500 TABLET ORAL at 09:08

## 2020-08-07 RX ADMIN — SODIUM CHLORIDE: 0.9 INJECTION, SOLUTION INTRAVENOUS at 02:08

## 2020-08-07 RX ADMIN — PROPRANOLOL HYDROCHLORIDE 10 MG: 10 TABLET ORAL at 10:08

## 2020-08-07 RX ADMIN — CIPROFLOXACIN 500 MG: 500 TABLET, FILM COATED ORAL at 12:08

## 2020-08-07 NOTE — ASSESSMENT & PLAN NOTE
Repeat COVID ordered; rapid negative but + cough, diarrhea, mild LFT elevation  CXR without infiltrates  D-dimer elevated so checking CTA for PE tonight. Will start heparin gtt if CTA positive  No antibx for now with normal procal but aspiration in differential as well so could add Zosyn if needed  8/5: POX 96% on RA and RR down to 18/min. Maybe dehydration/hypoK played a role here    8/6: resolved. COVID negative. RR 18 from 30-40 on admit.   8/7 remains with - check EKG verify sinus tach

## 2020-08-07 NOTE — ASSESSMENT & PLAN NOTE
Differential broad  Low grade fevers  C. Diff possible  CXR clear  UA clear  Aspiration in differential    Ruled out PE with CTA  Hold antibx for now as lactic acid and procal is normal but low threshold to start if any decompensation overnight     8/6: resolved without antibx treatment. Could be dehydration.     8/7  Slight bump again today. Start cipro flagyl and check cxr again.

## 2020-08-07 NOTE — PLAN OF CARE
Patient is compliant with fluid and medication management.  Patient is compliant with with all lab testing and procedures.  Patient remains free from all falls and injury.  VSS.  PEG tube for feedings.  NPO.  Plan of care reviewed and agreed upon with patient.  Will continue to monitor.

## 2020-08-07 NOTE — PLAN OF CARE
Referral sent to [facility]. Patient is a existing patient, and should return home with them after discharge.        08/07/20 1511   Post-Acute Status   Post-Acute Authorization Home [facility]   Home Health Status Referrals Sent   Discharge Plan   Discharge Plan [facility]

## 2020-08-07 NOTE — ASSESSMENT & PLAN NOTE
Antibiotic associate diarrhea vs true C. Diff  C. Diff pending  If worsening clinical status will start PO Vanc for treatment; holding tonight as true clinical picture not yet clear    8/6:  Check stool studies--pending. C. Diff negative  Change NS 150cc/hr  8/7 Blood Cx negative, afebrile but WBC trending up; will add Cipro + flagyl and cont for 7 days  Stool for Ova/parasites

## 2020-08-07 NOTE — PLAN OF CARE
Plan of care reviewed and agreed upon by patient's mother who remains at the bedside. Patient is sinus tach on telemetry/ vital signs stable. IVF infusing to riht forearm at 150 ml/hr. IV to right hand not able to flush and was discontinued with catheter intact. Mother assisted with bolus ped tube feedings. No residual noted. Patient tolerated feedings well. Patient had 3 loose stools on this shift and is voiding per diaper. Stool was sent to lab for ova and parasites as ordered. Patient has been repositioned every two hours with head of bed elevated. Patient has remained free from falls/injury.

## 2020-08-07 NOTE — ASSESSMENT & PLAN NOTE
Replace IV and via PEG today  Labs in AM  Likely due to severe diarrhea  telemetry  K+ better 2.2>3.3 after 5 >> 10meq IV riders in ER and 20meq po   Will repeat g tube this am 40meq po once this am    8/6: resolved. Stop K in fluids today. Still having diarrhea so will keep 1 more day and repeat labs in morning to be sure remains stable as this was his main reason for admission  8/7 K+ 4.2

## 2020-08-07 NOTE — PLAN OF CARE
Glen may discharge tomorrow if WBC is improved. He will go home with [facility].Information referral sent through [facility].

## 2020-08-07 NOTE — SUBJECTIVE & OBJECTIVE
Review of Systems   Unable to perform ROS: Patient nonverbal     Objective:     Vital Signs (Most Recent):  Temp: 97.9 °F (36.6 °C) (08/07/20 0719)  Pulse: (!) 123 (08/07/20 0719)  Resp: (!) 28 (08/07/20 0719)  BP: (!) 126/92 (08/07/20 0719)  SpO2: 95 % (08/07/20 0719) Vital Signs (24h Range):  Temp:  [97 °F (36.1 °C)-99.5 °F (37.5 °C)] 97.9 °F (36.6 °C)  Pulse:  [111-157] 123  Resp:  [16-28] 28  SpO2:  [95 %-99 %] 95 %  BP: (118-142)/(73-92) 126/92     Weight: 35.4 kg (78 lb 0.7 oz)  Body mass index is 16.89 kg/m².    Physical Exam  Vitals signs and nursing note reviewed.   Constitutional:       Appearance: He is well-developed.   HENT:      Head: Normocephalic and atraumatic.      Right Ear: External ear normal.      Left Ear: External ear normal.      Nose: Nose normal.   Eyes:      General:         Right eye: No discharge.         Left eye: No discharge.      Conjunctiva/sclera: Conjunctivae normal.      Pupils: Pupils are equal, round, and reactive to light.      Comments: Strabismus right eye   Neck:      Musculoskeletal: Neck supple.      Thyroid: No thyromegaly.   Cardiovascular:      Rate and Rhythm: Tachycardia present.      Heart sounds: No murmur. No friction rub. No gallop.       Comments: Tachycardia, regular  Pulmonary:      Effort: Pulmonary effort is normal. No respiratory distress.      Breath sounds: Normal breath sounds. No wheezing or rales.   Abdominal:      General: Abdomen is flat. Bowel sounds are normal. There is no distension.      Palpations: Abdomen is soft.      Tenderness: There is no abdominal tenderness.      Comments: PEG in place with mild redness, no drainage   Musculoskeletal:      Right lower leg: No edema.      Left lower leg: No edema.      Comments: Muscle atrophy  contractures   Lymphadenopathy:      Cervical: No cervical adenopathy.   Skin:     General: Skin is warm and dry.   Neurological:      Mental Status: He is alert.      Comments: Patient alert but otherwise not  responsive and can not cooperate in exam, severe CP           CRANIAL NERVES     CN III, IV, VI   Pupils are equal, round, and reactive to light.       Significant Labs: covid screen negative-- repeat pending   CBC:   Recent Labs   Lab 2041 20  06   WBC 12.08 14.45*   HGB 12.1* 11.2*   HCT 37.9* 34.8*    316     CMP:   Recent Labs   Lab 20  0641 20  06    140   K 4.6 4.2    104   CO2 27 28   GLU 91 99   BUN 6 5*   CREATININE 0.6 0.5   CALCIUM 8.4* 8.1*   PROT 6.8 6.3   ALBUMIN 2.8* 2.5*   BILITOT 0.5 0.2   ALKPHOS 96 90   AST 30 25   ALT 40 35   ANIONGAP 9 8   EGFRNONAA >60 >60      phenobarb 25.8 .    CRP 58.1    procal 0.21  Lactic acid 1.3    Urine Studies:   No results for input(s): COLORU, APPEARANCEUA, PHUR, SPECGRAV, PROTEINUA, GLUCUA, KETONESU, BILIRUBINUA, OCCULTUA, NITRITE, UROBILINOGEN, LEUKOCYTESUR, RBCUA, WBCUA, BACTERIA, SQUAMEPITHEL, HYALINECASTS in the last 48 hours.    Invalid input(s): WRIGHTSUR  c diff negative .      Blood cultures NGTD- in process x 2     Strep and flu negative     Significant Imagin/4 CXR The lungs are clear, with normal appearance of pulmonary vasculature and no pleural effusion or pneumothorax.     The cardiac silhouette is normal in size. The hilar and mediastinal contours are unremarkable.     Bones are intact.     CTA chest     1. No evidence of acute cardiopulmonary embolus.  2. Still a zone of increased density identified in the patient's posterior gutter on the left 12 mm in size may reflect a early infiltrate or suspicious pulmonary nodule formation.  On any matter, short-term interval follow-up within a 6 month interval to document stability imaging findings is advised based on the Fleischner criteria.  3. 6 mm cyst projecting in the posterior interpolar region of the left kidney.  4. Prominent distention of the esophagus with evidence of retention of fluid that may reflect findings as a  manifestation of achalasia with scleroderma as there is of moderate tapering of the distal esophagus at the level of the gastroesophageal junction.       8/5 EKG Sinus tachycardia  Otherwise normal ECG  When compared with ECG of 15-OCT-2019 18:43,  Nonspecific T wave abnormality now evident in Anterior leads

## 2020-08-07 NOTE — PROGRESS NOTES
"[facility] Medicine  Progress Note    Patient Name: Glen Moscoso  MRN: [id number]  Patient Class: IP- Inpatient   Admission Date: 8/4/2020  Length of Stay: 2 days  Attending Physician: Anjelica Brandt MD  Primary Care Provider: Yadi Lawson MD        Subjective:     Principal Problem:Hypokalemia        HPI:  Patient presented to ER with fever and cough. Patient has cerebral palsy and is non-verbal. Mother provides history. He was seen at [facility] ER 2 weeks ago and diagnosed with bronchitis and strep throat. Treated with amoxil. He completed antibx 4 days ago. Mom reports loose watery stools 3-4x a day for almost 2 weeks now. She noted he had fever yesterday "He was burning up" but did not check with thermometer. She notes he is breathing a little heavier than normal. + cough. She does not think he is in any pain. No changes to tube feeds (Ensure 4x a day via PEG). No sick contacts or recent travel but was in [facility] ER and was hospitalized at [place] before that for "aspiration" and on antibx then. Patient has low grade temp 99.5. HR 120s. RR 30s. Sats 96% on RA. Rapid COVID negative however note mild elevation LFTs, LDH, ferritin pending. D-dimer elevated, CTA pending. Lactic acid and procalcitonin normal. Repeat COVID swab ordered. WBC 15K. UA not infectious. K 2.2--likely due to diarrhea. GFR > 60 (Cr 0.7 but very low muscle mass). CXR clear. Flu and strep negative. Admitted for treatment of hypokalemia and possible C. Diff vs COVID vs PE.     Overview/Hospital Course:  CTA done this am negative for PE. POX 96% RR 18. He is still tachycardiac. With low grade fever. And diarrhea persist. K+ is better after 5-10meq KCL riders and 20meq via g tube.     8/6: COVID negative. Still having diarrhea. Still tachycardia (rate 120s and regular); mom doesn't know if this is his baseline. RR back to normal. He is more alert and interactive with TV. K is normal. Stool studies still pending. C. Diff " negative.     8/7/2020 Blood Cx negative x 3d- but WBC trending up WBC 12.08> 14.45, will add Cipro , repeat CXR   Patient had 3 loose stools last shift/voiding per diaper. Stool was sent to lab for ova and parasites as ordered, c-diff negative    K+ 4.2   Getting IVFs @ 150ml/hr , HR Remains 114 sinus tach      Review of Systems   Unable to perform ROS: Patient nonverbal     Objective:     Vital Signs (Most Recent):  Temp: 97.9 °F (36.6 °C) (08/07/20 0719)  Pulse: (!) 123 (08/07/20 0719)  Resp: (!) 28 (08/07/20 0719)  BP: (!) 126/92 (08/07/20 0719)  SpO2: 95 % (08/07/20 0719) Vital Signs (24h Range):  Temp:  [97 °F (36.1 °C)-99.5 °F (37.5 °C)] 97.9 °F (36.6 °C)  Pulse:  [111-157] 123  Resp:  [16-28] 28  SpO2:  [95 %-99 %] 95 %  BP: (118-142)/(73-92) 126/92     Weight: 35.4 kg (78 lb 0.7 oz)  Body mass index is 16.89 kg/m².    Physical Exam  Vitals signs and nursing note reviewed.   Constitutional:       Appearance: He is well-developed.   HENT:      Head: Normocephalic and atraumatic.      Right Ear: External ear normal.      Left Ear: External ear normal.      Nose: Nose normal.   Eyes:      General:         Right eye: No discharge.         Left eye: No discharge.      Conjunctiva/sclera: Conjunctivae normal.      Pupils: Pupils are equal, round, and reactive to light.      Comments: Strabismus right eye   Neck:      Musculoskeletal: Neck supple.      Thyroid: No thyromegaly.   Cardiovascular:      Rate and Rhythm: Tachycardia present.      Heart sounds: No murmur. No friction rub. No gallop.       Comments: Tachycardia, regular  Pulmonary:      Effort: Pulmonary effort is normal. No respiratory distress.      Breath sounds: Normal breath sounds. No wheezing or rales.   Abdominal:      General: Abdomen is flat. Bowel sounds are normal. There is no distension.      Palpations: Abdomen is soft.      Tenderness: There is no abdominal tenderness.      Comments: PEG in place with mild redness, no drainage    Musculoskeletal:      Right lower leg: No edema.      Left lower leg: No edema.      Comments: Muscle atrophy  contractures   Lymphadenopathy:      Cervical: No cervical adenopathy.   Skin:     General: Skin is warm and dry.   Neurological:      Mental Status: He is alert.      Comments: Patient alert but otherwise not responsive and can not cooperate in exam, severe CP           CRANIAL NERVES     CN III, IV, VI   Pupils are equal, round, and reactive to light.       Significant Labs: covid screen negative-- repeat pending   CBC:   Recent Labs   Lab 2041 20  0623   WBC 12.08 14.45*   HGB 12.1* 11.2*   HCT 37.9* 34.8*    316     CMP:   Recent Labs   Lab 2041 20  0623    140   K 4.6 4.2    104   CO2 27 28   GLU 91 99   BUN 6 5*   CREATININE 0.6 0.5   CALCIUM 8.4* 8.1*   PROT 6.8 6.3   ALBUMIN 2.8* 2.5*   BILITOT 0.5 0.2   ALKPHOS 96 90   AST 30 25   ALT 40 35   ANIONGAP 9 8   EGFRNONAA >60 >60      phenobarb 25.8 .    CRP 58.1    procal 0.21  Lactic acid 1.3    Urine Studies:   No results for input(s): COLORU, APPEARANCEUA, PHUR, SPECGRAV, PROTEINUA, GLUCUA, KETONESU, BILIRUBINUA, OCCULTUA, NITRITE, UROBILINOGEN, LEUKOCYTESUR, RBCUA, WBCUA, BACTERIA, SQUAMEPITHEL, HYALINECASTS in the last 48 hours.    Invalid input(s): WRIGHTSUR  c diff negative .      Blood cultures NGTD- in process x 2     Strep and flu negative     Significant Imagin/4 CXR The lungs are clear, with normal appearance of pulmonary vasculature and no pleural effusion or pneumothorax.     The cardiac silhouette is normal in size. The hilar and mediastinal contours are unremarkable.     Bones are intact.     CTA chest     1. No evidence of acute cardiopulmonary embolus.  2. Still a zone of increased density identified in the patient's posterior gutter on the left 12 mm in size may reflect a early infiltrate or suspicious pulmonary nodule formation.  On any matter, short-term  interval follow-up within a 6 month interval to document stability imaging findings is advised based on the Fleischner criteria.  3. 6 mm cyst projecting in the posterior interpolar region of the left kidney.  4. Prominent distention of the esophagus with evidence of retention of fluid that may reflect findings as a manifestation of achalasia with scleroderma as there is of moderate tapering of the distal esophagus at the level of the gastroesophageal junction.       8/5 EKG Sinus tachycardia  Otherwise normal ECG  When compared with ECG of 15-OCT-2019 18:43,  Nonspecific T wave abnormality now evident in Anterior leads      Assessment/Plan:      * Hypokalemia  Replace IV and via PEG today  Labs in AM  Likely due to severe diarrhea  telemetry  K+ better 2.2>3.3 after 5 >> 10meq IV riders in ER and 20meq po   Will repeat g tube this am 40meq po once this am    8/6: resolved. Stop K in fluids today. Still having diarrhea so will keep 1 more day and repeat labs in morning to be sure remains stable as this was his main reason for admission  8/7 K+ 4.2       Leukocytosis  Differential broad  Low grade fevers  C. Diff possible  CXR clear  UA clear  Aspiration in differential    Ruled out PE with CTA  Hold antibx for now as lactic acid and procal is normal but low threshold to start if any decompensation overnight     8/6: resolved without antibx treatment. Could be dehydration.     8/7  Slight bump again today. Start cipro flagyl and check cxr again.      Diarrhea of presumed infectious origin  Antibiotic associate diarrhea vs true C. Diff  C. Diff pending  If worsening clinical status will start PO Vanc for treatment; holding tonight as true clinical picture not yet clear    8/6:  Check stool studies--pending. C. Diff negative  Change NS 150cc/hr  8/7 Blood Cx negative, afebrile but WBC trending up; will add Cipro + flagyl and cont for 7 days  Stool for Ova/parasites     Tachypnea  Repeat COVID ordered; rapid negative  but + cough, diarrhea, mild LFT elevation  CXR without infiltrates  D-dimer elevated so checking CTA for PE tonight. Will start heparin gtt if CTA positive  No antibx for now with normal procal but aspiration in differential as well so could add Zosyn if needed  8/5: POX 96% on RA and RR down to 18/min. Maybe dehydration/hypoK played a role here    8/6: resolved. COVID negative. RR 18 from 30-40 on admit.   8/7 remains with - check EKG verify sinus tach     Sinus tachycardia  NS 10cc/hr  Likely some dehydration from frequent diarrhea (very low muscle mass so I trust Cr less)  D-dimer elevated so assessing for PE as well  CTA negative PE    Mom unsure his baseline heart rate.     Cerebral palsy  Cont home phenobarbital and baclofen  Cont tube feeds per home routine via PEG  He is non-verbal and not mobile at baseline. He seems to be more interactive and grunting at TV today. Mom thinks this is his baseline        VTE Risk Mitigation (From admission, onward)         Ordered     IP VTE LOW RISK PATIENT  Once      08/04/20 9745                      Alexa Sotomayor MD  Department of Hospital Medicine   [facility]

## 2020-08-08 LAB
ALBUMIN SERPL BCP-MCNC: 2.8 G/DL (ref 3.5–5.2)
ALP SERPL-CCNC: 96 U/L (ref 55–135)
ALT SERPL W/O P-5'-P-CCNC: 69 U/L (ref 10–44)
ANION GAP SERPL CALC-SCNC: 12 MMOL/L (ref 8–16)
AST SERPL-CCNC: 65 U/L (ref 10–40)
BASOPHILS # BLD AUTO: 0.05 K/UL (ref 0–0.2)
BASOPHILS NFR BLD: 0.3 % (ref 0–1.9)
BILIRUB SERPL-MCNC: 0.3 MG/DL (ref 0.1–1)
BUN SERPL-MCNC: 8 MG/DL (ref 6–20)
CALCIUM SERPL-MCNC: 8.8 MG/DL (ref 8.7–10.5)
CHLORIDE SERPL-SCNC: 107 MMOL/L (ref 95–110)
CO2 SERPL-SCNC: 24 MMOL/L (ref 23–29)
CREAT SERPL-MCNC: 0.6 MG/DL (ref 0.5–1.4)
DIFFERENTIAL METHOD: ABNORMAL
EOSINOPHIL # BLD AUTO: 1.4 K/UL (ref 0–0.5)
EOSINOPHIL NFR BLD: 9 % (ref 0–8)
ERYTHROCYTE [DISTWIDTH] IN BLOOD BY AUTOMATED COUNT: 14.1 % (ref 11.5–14.5)
EST. GFR  (AFRICAN AMERICAN): >60 ML/MIN/1.73 M^2
EST. GFR  (NON AFRICAN AMERICAN): >60 ML/MIN/1.73 M^2
GLUCOSE SERPL-MCNC: 90 MG/DL (ref 70–110)
HCT VFR BLD AUTO: 36.4 % (ref 40–54)
HGB BLD-MCNC: 11.2 G/DL (ref 14–18)
IMM GRANULOCYTES # BLD AUTO: 0.09 K/UL (ref 0–0.04)
IMM GRANULOCYTES NFR BLD AUTO: 0.6 % (ref 0–0.5)
LACTATE SERPL-SCNC: 1.1 MMOL/L (ref 0.5–2.2)
LYMPHOCYTES # BLD AUTO: 1.4 K/UL (ref 1–4.8)
LYMPHOCYTES NFR BLD: 9.4 % (ref 18–48)
MCH RBC QN AUTO: 28.4 PG (ref 27–31)
MCHC RBC AUTO-ENTMCNC: 30.8 G/DL (ref 32–36)
MCV RBC AUTO: 92 FL (ref 82–98)
MONOCYTES # BLD AUTO: 1.1 K/UL (ref 0.3–1)
MONOCYTES NFR BLD: 7 % (ref 4–15)
NEUTROPHILS # BLD AUTO: 11.1 K/UL (ref 1.8–7.7)
NEUTROPHILS NFR BLD: 73.7 % (ref 38–73)
NRBC BLD-RTO: 0 /100 WBC
PLATELET # BLD AUTO: 257 K/UL (ref 150–350)
PMV BLD AUTO: 12 FL (ref 9.2–12.9)
POCT GLUCOSE: 102 MG/DL (ref 70–110)
POCT GLUCOSE: 87 MG/DL (ref 70–110)
POCT GLUCOSE: 89 MG/DL (ref 70–110)
POCT GLUCOSE: 90 MG/DL (ref 70–110)
POTASSIUM SERPL-SCNC: 3.8 MMOL/L (ref 3.5–5.1)
PROCALCITONIN SERPL IA-MCNC: 0.25 NG/ML
PROT SERPL-MCNC: 6.7 G/DL (ref 6–8.4)
RBC # BLD AUTO: 3.95 M/UL (ref 4.6–6.2)
SODIUM SERPL-SCNC: 143 MMOL/L (ref 136–145)
WBC # BLD AUTO: 15.04 K/UL (ref 3.9–12.7)

## 2020-08-08 PROCEDURE — 99233 PR SUBSEQUENT HOSPITAL CARE,LEVL III: ICD-10-PCS | Mod: ,,, | Performed by: FAMILY MEDICINE

## 2020-08-08 PROCEDURE — 94761 N-INVAS EAR/PLS OXIMETRY MLT: CPT

## 2020-08-08 PROCEDURE — 25000003 PHARM REV CODE 250: Performed by: NURSE PRACTITIONER

## 2020-08-08 PROCEDURE — 25000003 PHARM REV CODE 250: Performed by: FAMILY MEDICINE

## 2020-08-08 PROCEDURE — 99233 SBSQ HOSP IP/OBS HIGH 50: CPT | Mod: ,,, | Performed by: FAMILY MEDICINE

## 2020-08-08 PROCEDURE — 36415 COLL VENOUS BLD VENIPUNCTURE: CPT

## 2020-08-08 PROCEDURE — 94760 N-INVAS EAR/PLS OXIMETRY 1: CPT

## 2020-08-08 PROCEDURE — 87040 BLOOD CULTURE FOR BACTERIA: CPT

## 2020-08-08 PROCEDURE — 25000003 PHARM REV CODE 250: Performed by: INTERNAL MEDICINE

## 2020-08-08 PROCEDURE — 84145 PROCALCITONIN (PCT): CPT

## 2020-08-08 PROCEDURE — 80053 COMPREHEN METABOLIC PANEL: CPT

## 2020-08-08 PROCEDURE — 11000001 HC ACUTE MED/SURG PRIVATE ROOM

## 2020-08-08 PROCEDURE — 83605 ASSAY OF LACTIC ACID: CPT

## 2020-08-08 PROCEDURE — 85025 COMPLETE CBC W/AUTO DIFF WBC: CPT

## 2020-08-08 RX ORDER — PROPRANOLOL HYDROCHLORIDE 20 MG/1
20 TABLET ORAL 3 TIMES DAILY
Status: DISCONTINUED | OUTPATIENT
Start: 2020-08-08 | End: 2020-08-18 | Stop reason: HOSPADM

## 2020-08-08 RX ORDER — LEVOFLOXACIN 500 MG/1
500 TABLET, FILM COATED ORAL DAILY
Status: DISCONTINUED | OUTPATIENT
Start: 2020-08-08 | End: 2020-08-15

## 2020-08-08 RX ORDER — TRIPROLIDINE/PSEUDOEPHEDRINE 2.5MG-60MG
300 TABLET ORAL EVERY 6 HOURS PRN
Status: DISCONTINUED | OUTPATIENT
Start: 2020-08-08 | End: 2020-08-18 | Stop reason: HOSPADM

## 2020-08-08 RX ADMIN — LEVOFLOXACIN 500 MG: 500 TABLET, FILM COATED ORAL at 08:08

## 2020-08-08 RX ADMIN — PROPRANOLOL HYDROCHLORIDE 20 MG: 20 TABLET ORAL at 08:08

## 2020-08-08 RX ADMIN — ACETAMINOPHEN 650 MG: 325 TABLET ORAL at 05:08

## 2020-08-08 RX ADMIN — BACLOFEN 10 MG: 10 TABLET ORAL at 08:08

## 2020-08-08 RX ADMIN — METRONIDAZOLE 500 MG: 500 TABLET ORAL at 05:08

## 2020-08-08 RX ADMIN — IBUPROFEN 300 MG: 100 SUSPENSION ORAL at 04:08

## 2020-08-08 RX ADMIN — BACLOFEN 10 MG: 10 TABLET ORAL at 09:08

## 2020-08-08 RX ADMIN — BACLOFEN 10 MG: 10 TABLET ORAL at 02:08

## 2020-08-08 RX ADMIN — PHENOBARBITAL 100 MG: 20 ELIXIR ORAL at 09:08

## 2020-08-08 RX ADMIN — METRONIDAZOLE 500 MG: 500 TABLET ORAL at 02:08

## 2020-08-08 RX ADMIN — GABAPENTIN 250 MG: 250 SOLUTION ORAL at 09:08

## 2020-08-08 RX ADMIN — PROPRANOLOL HYDROCHLORIDE 20 MG: 20 TABLET ORAL at 09:08

## 2020-08-08 RX ADMIN — METRONIDAZOLE 500 MG: 500 TABLET ORAL at 09:08

## 2020-08-08 RX ADMIN — ACETAMINOPHEN 650 MG: 325 TABLET ORAL at 02:08

## 2020-08-08 RX ADMIN — PROPRANOLOL HYDROCHLORIDE 20 MG: 20 TABLET ORAL at 02:08

## 2020-08-08 NOTE — ASSESSMENT & PLAN NOTE
NS 10cc/hr  Likely some dehydration from frequent diarrhea (very low muscle mass so I trust Cr less)  D-dimer elevated so assessing for PE as well  CTA negative PE    Mom unsure his baseline heart rate.     Started on propranolol yesterday

## 2020-08-08 NOTE — ASSESSMENT & PLAN NOTE
Antibiotic associate diarrhea vs true C. Diff  C. Diff pending  If worsening clinical status will start PO Vanc for treatment; holding tonight as true clinical picture not yet clear    8/6:  Check stool studies--pending. C. Diff negative  Change NS 150cc/hr  8/7 Blood Cx negative, afebrile but WBC trending up; will add Cipro + flagyl and cont for 7 days  Stool for Ova/parasites     8/8  Stool studies all negative. Started on flagyl anyway. Will give him probiotics as well.

## 2020-08-08 NOTE — PLAN OF CARE
Patient is compliant with fluid and medication management.  Patient is compliant with with all lab testing and procedures.  Patient remains free from all falls and injury.  VSS.  Febrile today.  Bagged for urine specimen.  Plan of care reviewed and agreed upon with mother.  Will continue to monitor.

## 2020-08-08 NOTE — ASSESSMENT & PLAN NOTE
Repeat COVID ordered; rapid negative but + cough, diarrhea, mild LFT elevation  CXR without infiltrates  D-dimer elevated so checking CTA for PE tonight. Will start heparin gtt if CTA positive  No antibx for now with normal procal but aspiration in differential as well so could add Zosyn if needed  8/5: POX 96% on RA and RR down to 18/min. Maybe dehydration/hypoK played a role here    8/6: resolved. COVID negative. RR 18 from 30-40 on admit.   8/7 remains with - check EKG verify sinus tach     8/8  CXR looks like he has a developing infiltrate. Levaquin started today. Has fever, increased wbc ct and now infiltrate. Reorder blood culture.

## 2020-08-08 NOTE — ASSESSMENT & PLAN NOTE
Replace IV and via PEG today  Labs in AM  Likely due to severe diarrhea  telemetry  K+ better 2.2>3.3 after 5 >> 10meq IV riders in ER and 20meq po   Will repeat g tube this am 40meq po once this am    8/6: resolved. Stop K in fluids today. Still having diarrhea so will keep 1 more day and repeat labs in morning to be sure remains stable as this was his main reason for admission  8/7 K+ 4.2     Resolved

## 2020-08-08 NOTE — SUBJECTIVE & OBJECTIVE
Review of Systems   Unable to perform ROS: Dementia     Objective:     Vital Signs (Most Recent):  Temp: 97.7 °F (36.5 °C) (08/08/20 0713)  Pulse: 109 (08/08/20 1011)  Resp: (!) 26 (08/08/20 0713)  BP: 127/81 (08/08/20 0713)  SpO2: 96 % (08/08/20 0713) Vital Signs (24h Range):  Temp:  [97.7 °F (36.5 °C)-100.6 °F (38.1 °C)] 97.7 °F (36.5 °C)  Pulse:  [109-158] 109  Resp:  [16-30] 26  SpO2:  [94 %-98 %] 96 %  BP: (105-131)/(66-81) 127/81     Weight: 35.4 kg (78 lb 0.7 oz)  Body mass index is 16.89 kg/m².    Physical Exam  Vitals signs and nursing note reviewed.   Constitutional:       Appearance: He is well-developed.   HENT:      Head: Normocephalic and atraumatic.      Right Ear: External ear normal.      Left Ear: External ear normal.      Nose: Nose normal.   Eyes:      General:         Right eye: No discharge.         Left eye: No discharge.      Conjunctiva/sclera: Conjunctivae normal.      Pupils: Pupils are equal, round, and reactive to light.      Comments: Strabismus right eye   Neck:      Musculoskeletal: Neck supple.      Thyroid: No thyromegaly.   Cardiovascular:      Rate and Rhythm: Tachycardia present.      Heart sounds: No murmur. No friction rub. No gallop.       Comments: Tachycardia, regular  Pulmonary:      Effort: Pulmonary effort is normal. No respiratory distress.      Breath sounds: Normal breath sounds. No wheezing or rales.   Abdominal:      General: Abdomen is flat. Bowel sounds are normal. There is no distension.      Palpations: Abdomen is soft.      Tenderness: There is no abdominal tenderness.      Comments: PEG in place with mild redness, no drainage    Foul smelling stool   Musculoskeletal:      Right lower leg: No edema.      Left lower leg: No edema.      Comments: Muscle atrophy  contractures   Lymphadenopathy:      Cervical: No cervical adenopathy.   Skin:     General: Skin is warm and dry.   Neurological:      Mental Status: He is alert.      Comments: Patient alert but  otherwise not responsive and can not cooperate in exam, severe CP   Psychiatric:      Comments: Uneasy in bed. Rotating around           CRANIAL NERVES     CN III, IV, VI   Pupils are equal, round, and reactive to light.       Significant Labs: covid screen negative-- repeat pending   CBC:   Recent Labs   Lab 20  0655   WBC 14.45* 15.04*   HGB 11.2* 11.2*   HCT 34.8* 36.4*    257     CMP:   Recent Labs   Lab 20  0655    143   K 4.2 3.8    107   CO2 28 24   GLU 99 90   BUN 5* 8   CREATININE 0.5 0.6   CALCIUM 8.1* 8.8   PROT 6.3 6.7   ALBUMIN 2.5* 2.8*   BILITOT 0.2 0.3   ALKPHOS 90 96   AST 25 65*   ALT 35 69*   ANIONGAP 8 12   EGFRNONAA >60 >60      phenobarb 25.8 .    CRP 58.1    procal 0.21  Lactic acid 1.3    Urine Studies:   No results for input(s): COLORU, APPEARANCEUA, PHUR, SPECGRAV, PROTEINUA, GLUCUA, KETONESU, BILIRUBINUA, OCCULTUA, NITRITE, UROBILINOGEN, LEUKOCYTESUR, RBCUA, WBCUA, BACTERIA, SQUAMEPITHEL, HYALINECASTS in the last 48 hours.    Invalid input(s): WRIGHTSUR  c diff negative .      Blood cultures NGTD- in process x 2     Strep and flu negative     Significant Imagin/4 CXR The lungs are clear, with normal appearance of pulmonary vasculature and no pleural effusion or pneumothorax.     The cardiac silhouette is normal in size. The hilar and mediastinal contours are unremarkable.     Bones are intact.     CTA chest     1. No evidence of acute cardiopulmonary embolus.  2. Still a zone of increased density identified in the patient's posterior gutter on the left 12 mm in size may reflect a early infiltrate or suspicious pulmonary nodule formation.  On any matter, short-term interval follow-up within a 6 month interval to document stability imaging findings is advised based on the Fleischner criteria.  3. 6 mm cyst projecting in the posterior interpolar region of the left kidney.  4. Prominent distention of the esophagus  with evidence of retention of fluid that may reflect findings as a manifestation of achalasia with scleroderma as there is of moderate tapering of the distal esophagus at the level of the gastroesophageal junction.       8/5 EKG Sinus tachycardia  Otherwise normal ECG  When compared with ECG of 15-OCT-2019 18:43,  Nonspecific T wave abnormality now evident in Anterior leads

## 2020-08-08 NOTE — PLAN OF CARE
Patient resting with mom at bedside. Mom with request for something to help patient rest because he seems agitated. No new orders received. Patient 's, gave one time dose of Imdur and B/P down to 110's. Fever at 100.6 noted, gave tylenol and came down to 97.7, otherwise VS stable. Peg tube patent. Tolerating feedings and medicines through tube. No acute changes noted. Plan of care reviewed with parent and agreed upon.

## 2020-08-08 NOTE — PROGRESS NOTES
"[facility] Medicine  Progress Note    Patient Name: Glen Moscoso  MRN: [id number]  Patient Class: IP- Inpatient   Admission Date: 8/4/2020  Length of Stay: 3 days  Attending Physician: Anjelica Brandt MD  Primary Care Provider: Yadi Lawson MD        Subjective:     Principal Problem:Hypokalemia        HPI:  Patient presented to ER with fever and cough. Patient has cerebral palsy and is non-verbal. Mother provides history. He was seen at [facility] ER 2 weeks ago and diagnosed with bronchitis and strep throat. Treated with amoxil. He completed antibx 4 days ago. Mom reports loose watery stools 3-4x a day for almost 2 weeks now. She noted he had fever yesterday "He was burning up" but did not check with thermometer. She notes he is breathing a little heavier than normal. + cough. She does not think he is in any pain. No changes to tube feeds (Ensure 4x a day via PEG). No sick contacts or recent travel but was in [facility] ER and was hospitalized at [place] before that for "aspiration" and on antibx then. Patient has low grade temp 99.5. HR 120s. RR 30s. Sats 96% on RA. Rapid COVID negative however note mild elevation LFTs, LDH, ferritin pending. D-dimer elevated, CTA pending. Lactic acid and procalcitonin normal. Repeat COVID swab ordered. WBC 15K. UA not infectious. K 2.2--likely due to diarrhea. GFR > 60 (Cr 0.7 but very low muscle mass). CXR clear. Flu and strep negative. Admitted for treatment of hypokalemia and possible C. Diff vs COVID vs PE.     Overview/Hospital Course:  CTA done this am negative for PE. POX 96% RR 18. He is still tachycardiac. With low grade fever. And diarrhea persist. K+ is better after 5-10meq KCL riders and 20meq via g tube.     8/6: COVID negative. Still having diarrhea. Still tachycardia (rate 120s and regular); mom doesn't know if this is his baseline. RR back to normal. He is more alert and interactive with TV. K is normal. Stool studies still pending. C. Diff " negative.     8/7/2020 Blood Cx negative x 3d- but WBC trending up WBC 12.08> 14.45, will add Cipro , repeat CXR   Patient had 3 loose stools last shift/voiding per diaper. Stool was sent to lab for ova and parasites as ordered, c-diff negative    K+ 4.2   Getting IVFs @ 150ml/hr , HR Remains 114 sinus tach    8/8  Fever overnight. More agitated. HR up to 150s. Given propranolol and HR down to 100s. Less agitated. Slept okay. Still with foul smelling stool.      Review of Systems   Unable to perform ROS: Dementia     Objective:     Vital Signs (Most Recent):  Temp: 97.7 °F (36.5 °C) (08/08/20 0713)  Pulse: 109 (08/08/20 1011)  Resp: (!) 26 (08/08/20 0713)  BP: 127/81 (08/08/20 0713)  SpO2: 96 % (08/08/20 0713) Vital Signs (24h Range):  Temp:  [97.7 °F (36.5 °C)-100.6 °F (38.1 °C)] 97.7 °F (36.5 °C)  Pulse:  [109-158] 109  Resp:  [16-30] 26  SpO2:  [94 %-98 %] 96 %  BP: (105-131)/(66-81) 127/81     Weight: 35.4 kg (78 lb 0.7 oz)  Body mass index is 16.89 kg/m².    Physical Exam  Vitals signs and nursing note reviewed.   Constitutional:       Appearance: He is well-developed.   HENT:      Head: Normocephalic and atraumatic.      Right Ear: External ear normal.      Left Ear: External ear normal.      Nose: Nose normal.   Eyes:      General:         Right eye: No discharge.         Left eye: No discharge.      Conjunctiva/sclera: Conjunctivae normal.      Pupils: Pupils are equal, round, and reactive to light.      Comments: Strabismus right eye   Neck:      Musculoskeletal: Neck supple.      Thyroid: No thyromegaly.   Cardiovascular:      Rate and Rhythm: Tachycardia present.      Heart sounds: No murmur. No friction rub. No gallop.       Comments: Tachycardia, regular  Pulmonary:      Effort: Pulmonary effort is normal. No respiratory distress.      Breath sounds: Normal breath sounds. No wheezing or rales.   Abdominal:      General: Abdomen is flat. Bowel sounds are normal. There is no distension.      Palpations:  Abdomen is soft.      Tenderness: There is no abdominal tenderness.      Comments: PEG in place with mild redness, no drainage    Foul smelling stool   Musculoskeletal:      Right lower leg: No edema.      Left lower leg: No edema.      Comments: Muscle atrophy  contractures   Lymphadenopathy:      Cervical: No cervical adenopathy.   Skin:     General: Skin is warm and dry.   Neurological:      Mental Status: He is alert.      Comments: Patient alert but otherwise not responsive and can not cooperate in exam, severe CP   Psychiatric:      Comments: Uneasy in bed. Rotating around           CRANIAL NERVES     CN III, IV, VI   Pupils are equal, round, and reactive to light.       Significant Labs: covid screen negative-- repeat pending   CBC:   Recent Labs   Lab 20  0655   WBC 14.45* 15.04*   HGB 11.2* 11.2*   HCT 34.8* 36.4*    257     CMP:   Recent Labs   Lab 20  0655    143   K 4.2 3.8    107   CO2 28 24   GLU 99 90   BUN 5* 8   CREATININE 0.5 0.6   CALCIUM 8.1* 8.8   PROT 6.3 6.7   ALBUMIN 2.5* 2.8*   BILITOT 0.2 0.3   ALKPHOS 90 96   AST 25 65*   ALT 35 69*   ANIONGAP 8 12   EGFRNONAA >60 >60      phenobarb 25.8 .    CRP 58.1    procal 0.21  Lactic acid 1.3    Urine Studies:   No results for input(s): COLORU, APPEARANCEUA, PHUR, SPECGRAV, PROTEINUA, GLUCUA, KETONESU, BILIRUBINUA, OCCULTUA, NITRITE, UROBILINOGEN, LEUKOCYTESUR, RBCUA, WBCUA, BACTERIA, SQUAMEPITHEL, HYALINECASTS in the last 48 hours.    Invalid input(s): WRIGHTSUR  c diff negative .      Blood cultures NGTD- in process x 2     Strep and flu negative     Significant Imagin/4 CXR The lungs are clear, with normal appearance of pulmonary vasculature and no pleural effusion or pneumothorax.     The cardiac silhouette is normal in size. The hilar and mediastinal contours are unremarkable.     Bones are intact.     CTA chest     1. No evidence of acute cardiopulmonary  embolus.  2. Still a zone of increased density identified in the patient's posterior gutter on the left 12 mm in size may reflect a early infiltrate or suspicious pulmonary nodule formation.  On any matter, short-term interval follow-up within a 6 month interval to document stability imaging findings is advised based on the Fleischner criteria.  3. 6 mm cyst projecting in the posterior interpolar region of the left kidney.  4. Prominent distention of the esophagus with evidence of retention of fluid that may reflect findings as a manifestation of achalasia with scleroderma as there is of moderate tapering of the distal esophagus at the level of the gastroesophageal junction.       8/5 EKG Sinus tachycardia  Otherwise normal ECG  When compared with ECG of 15-OCT-2019 18:43,  Nonspecific T wave abnormality now evident in Anterior leads      Assessment/Plan:      * Hypokalemia  Replace IV and via PEG today  Labs in AM  Likely due to severe diarrhea  telemetry  K+ better 2.2>3.3 after 5 >> 10meq IV riders in ER and 20meq po   Will repeat g tube this am 40meq po once this am    8/6: resolved. Stop K in fluids today. Still having diarrhea so will keep 1 more day and repeat labs in morning to be sure remains stable as this was his main reason for admission  8/7 K+ 4.2     Resolved    Leukocytosis  Differential broad  Low grade fevers  C. Diff possible  CXR clear  UA clear  Aspiration in differential    Ruled out PE with CTA  Hold antibx for now as lactic acid and procal is normal but low threshold to start if any decompensation overnight     8/6: resolved without antibx treatment. Could be dehydration.     8/7  Slight bump again today. Start cipro flagyl and check cxr again.      Diarrhea of presumed infectious origin  Antibiotic associate diarrhea vs true C. Diff  C. Diff pending  If worsening clinical status will start PO Vanc for treatment; holding tonight as true clinical picture not yet clear    8/6:  Check stool  studies--pending. C. Diff negative  Change NS 150cc/hr  8/7 Blood Cx negative, afebrile but WBC trending up; will add Cipro + flagyl and cont for 7 days  Stool for Ova/parasites     8/8  Stool studies all negative. Started on flagyl anyway. Will give him probiotics as well.    Tachypnea  Repeat COVID ordered; rapid negative but + cough, diarrhea, mild LFT elevation  CXR without infiltrates  D-dimer elevated so checking CTA for PE tonight. Will start heparin gtt if CTA positive  No antibx for now with normal procal but aspiration in differential as well so could add Zosyn if needed  8/5: POX 96% on RA and RR down to 18/min. Maybe dehydration/hypoK played a role here    8/6: resolved. COVID negative. RR 18 from 30-40 on admit.   8/7 remains with - check EKG verify sinus tach     8/8  CXR looks like he has a developing infiltrate. Levaquin started today. Has fever, increased wbc ct and now infiltrate. Reorder blood culture.    Sinus tachycardia  NS 10cc/hr  Likely some dehydration from frequent diarrhea (very low muscle mass so I trust Cr less)  D-dimer elevated so assessing for PE as well  CTA negative PE    Mom unsure his baseline heart rate.     Started on propranolol yesterday    Cerebral palsy  Cont home phenobarbital and baclofen  Cont tube feeds per home routine via PEG  He is non-verbal and not mobile at baseline. He seems to be more interactive and grunting at TV today. Mom thinks this is his baseline        VTE Risk Mitigation (From admission, onward)         Ordered     IP VTE LOW RISK PATIENT  Once      08/04/20 3117                      Alexa Sotomayor MD  Department of Hospital Medicine   [facility]

## 2020-08-09 PROBLEM — R50.9 FEVER IN ADULT: Status: ACTIVE | Noted: 2020-08-09

## 2020-08-09 PROBLEM — J69.0 ASPIRATION PNEUMONITIS: Status: ACTIVE | Noted: 2020-08-09

## 2020-08-09 LAB
ADENOVIRUS: NOT DETECTED
ALBUMIN SERPL BCP-MCNC: 2.7 G/DL (ref 3.5–5.2)
ALP SERPL-CCNC: 82 U/L (ref 55–135)
ALT SERPL W/O P-5'-P-CCNC: 61 U/L (ref 10–44)
AMORPH CRY URNS QL MICRO: ABNORMAL
ANION GAP SERPL CALC-SCNC: 10 MMOL/L (ref 8–16)
AST SERPL-CCNC: 40 U/L (ref 10–40)
BACTERIA #/AREA URNS HPF: ABNORMAL /HPF
BACTERIA BLD CULT: NORMAL
BACTERIA BLD CULT: NORMAL
BASOPHILS # BLD AUTO: 0.02 K/UL (ref 0–0.2)
BASOPHILS NFR BLD: 0.2 % (ref 0–1.9)
BILIRUB SERPL-MCNC: 0.2 MG/DL (ref 0.1–1)
BILIRUB UR QL STRIP: NEGATIVE
BORDETELLA PARAPERTUSSIS (IS1001): NOT DETECTED
BORDETELLA PERTUSSIS (PTXP): NOT DETECTED
BUN SERPL-MCNC: 11 MG/DL (ref 6–20)
CALCIUM SERPL-MCNC: 8.3 MG/DL (ref 8.7–10.5)
CHLAMYDIA PNEUMONIAE: NOT DETECTED
CHLORIDE SERPL-SCNC: 113 MMOL/L (ref 95–110)
CLARITY UR: ABNORMAL
CO2 SERPL-SCNC: 23 MMOL/L (ref 23–29)
COLOR UR: YELLOW
CORONAVIRUS 229E, COMMON COLD VIRUS: NOT DETECTED
CORONAVIRUS HKU1, COMMON COLD VIRUS: NOT DETECTED
CORONAVIRUS NL63, COMMON COLD VIRUS: NOT DETECTED
CORONAVIRUS OC43, COMMON COLD VIRUS: NOT DETECTED
CREAT SERPL-MCNC: 0.7 MG/DL (ref 0.5–1.4)
DIFFERENTIAL METHOD: ABNORMAL
EOSINOPHIL # BLD AUTO: 0.4 K/UL (ref 0–0.5)
EOSINOPHIL NFR BLD: 4.2 % (ref 0–8)
ERYTHROCYTE [DISTWIDTH] IN BLOOD BY AUTOMATED COUNT: 14.5 % (ref 11.5–14.5)
EST. GFR  (AFRICAN AMERICAN): >60 ML/MIN/1.73 M^2
EST. GFR  (NON AFRICAN AMERICAN): >60 ML/MIN/1.73 M^2
FLUBV RNA NPH QL NAA+NON-PROBE: NOT DETECTED
GLUCOSE SERPL-MCNC: 93 MG/DL (ref 70–110)
GLUCOSE UR QL STRIP: NEGATIVE
HCT VFR BLD AUTO: 34.7 % (ref 40–54)
HGB BLD-MCNC: 10.9 G/DL (ref 14–18)
HGB UR QL STRIP: ABNORMAL
HPIV1 RNA NPH QL NAA+NON-PROBE: NOT DETECTED
HPIV2 RNA NPH QL NAA+NON-PROBE: NOT DETECTED
HPIV3 RNA NPH QL NAA+NON-PROBE: NOT DETECTED
HPIV4 RNA NPH QL NAA+NON-PROBE: NOT DETECTED
HUMAN METAPNEUMOVIRUS: NOT DETECTED
HYALINE CASTS #/AREA URNS LPF: 2 /LPF
IMM GRANULOCYTES # BLD AUTO: 0.04 K/UL (ref 0–0.04)
IMM GRANULOCYTES NFR BLD AUTO: 0.4 % (ref 0–0.5)
INFLUENZA A (SUBTYPES H1,H1-2009,H3): NOT DETECTED
INFLUENZA A, MOLECULAR: NEGATIVE
INFLUENZA B, MOLECULAR: NEGATIVE
KETONES UR QL STRIP: NEGATIVE
LEUKOCYTE ESTERASE UR QL STRIP: NEGATIVE
LYMPHOCYTES # BLD AUTO: 1.8 K/UL (ref 1–4.8)
LYMPHOCYTES NFR BLD: 18.9 % (ref 18–48)
MCH RBC QN AUTO: 28.5 PG (ref 27–31)
MCHC RBC AUTO-ENTMCNC: 31.4 G/DL (ref 32–36)
MCV RBC AUTO: 91 FL (ref 82–98)
MICROSCOPIC COMMENT: ABNORMAL
MONOCYTES # BLD AUTO: 1.3 K/UL (ref 0.3–1)
MONOCYTES NFR BLD: 13.6 % (ref 4–15)
MYCOPLASMA PNEUMONIAE: NOT DETECTED
NEUTROPHILS # BLD AUTO: 5.9 K/UL (ref 1.8–7.7)
NEUTROPHILS NFR BLD: 62.7 % (ref 38–73)
NITRITE UR QL STRIP: NEGATIVE
NRBC BLD-RTO: 0 /100 WBC
PH UR STRIP: 6 [PH] (ref 5–8)
PLATELET # BLD AUTO: 397 K/UL (ref 150–350)
PMV BLD AUTO: 10.2 FL (ref 9.2–12.9)
POCT GLUCOSE: 77 MG/DL (ref 70–110)
POCT GLUCOSE: 79 MG/DL (ref 70–110)
POCT GLUCOSE: 88 MG/DL (ref 70–110)
POCT GLUCOSE: 89 MG/DL (ref 70–110)
POTASSIUM SERPL-SCNC: 3.4 MMOL/L (ref 3.5–5.1)
PROT SERPL-MCNC: 6.7 G/DL (ref 6–8.4)
PROT UR QL STRIP: ABNORMAL
RBC # BLD AUTO: 3.82 M/UL (ref 4.6–6.2)
RBC #/AREA URNS HPF: 40 /HPF (ref 0–4)
RESPIRATORY INFECTION PANEL SOURCE: NORMAL
RSV RNA NPH QL NAA+NON-PROBE: NOT DETECTED
RV AG STL QL IA.RAPID: NEGATIVE
RV+EV RNA NPH QL NAA+NON-PROBE: NOT DETECTED
SARS-COV-2 RNA RESP QL NAA+PROBE: NOT DETECTED
SODIUM SERPL-SCNC: 146 MMOL/L (ref 136–145)
SP GR UR STRIP: >=1.03 (ref 1–1.03)
SPECIMEN SOURCE: NORMAL
URN SPEC COLLECT METH UR: ABNORMAL
UROBILINOGEN UR STRIP-ACNC: NEGATIVE EU/DL
WBC # BLD AUTO: 9.42 K/UL (ref 3.9–12.7)
WBC #/AREA URNS HPF: 8 /HPF (ref 0–5)

## 2020-08-09 PROCEDURE — 99233 SBSQ HOSP IP/OBS HIGH 50: CPT | Mod: ,,, | Performed by: FAMILY MEDICINE

## 2020-08-09 PROCEDURE — 81000 URINALYSIS NONAUTO W/SCOPE: CPT

## 2020-08-09 PROCEDURE — U0003 INFECTIOUS AGENT DETECTION BY NUCLEIC ACID (DNA OR RNA); SEVERE ACUTE RESPIRATORY SYNDROME CORONAVIRUS 2 (SARS-COV-2) (CORONAVIRUS DISEASE [COVID-19]), AMPLIFIED PROBE TECHNIQUE, MAKING USE OF HIGH THROUGHPUT TECHNOLOGIES AS DESCRIBED BY CMS-2020-01-R: HCPCS

## 2020-08-09 PROCEDURE — 25000003 PHARM REV CODE 250: Performed by: NURSE PRACTITIONER

## 2020-08-09 PROCEDURE — 80053 COMPREHEN METABOLIC PANEL: CPT

## 2020-08-09 PROCEDURE — 25000003 PHARM REV CODE 250: Performed by: FAMILY MEDICINE

## 2020-08-09 PROCEDURE — 87502 INFLUENZA DNA AMP PROBE: CPT

## 2020-08-09 PROCEDURE — 36415 COLL VENOUS BLD VENIPUNCTURE: CPT

## 2020-08-09 PROCEDURE — 87425 ROTAVIRUS AG IA: CPT

## 2020-08-09 PROCEDURE — 80074 ACUTE HEPATITIS PANEL: CPT

## 2020-08-09 PROCEDURE — 87798 DETECT AGENT NOS DNA AMP: CPT

## 2020-08-09 PROCEDURE — 85025 COMPLETE CBC W/AUTO DIFF WBC: CPT

## 2020-08-09 PROCEDURE — 99233 PR SUBSEQUENT HOSPITAL CARE,LEVL III: ICD-10-PCS | Mod: ,,, | Performed by: FAMILY MEDICINE

## 2020-08-09 PROCEDURE — 94761 N-INVAS EAR/PLS OXIMETRY MLT: CPT

## 2020-08-09 PROCEDURE — 11000001 HC ACUTE MED/SURG PRIVATE ROOM

## 2020-08-09 PROCEDURE — 63600175 PHARM REV CODE 636 W HCPCS: Performed by: INTERNAL MEDICINE

## 2020-08-09 PROCEDURE — 25000003 PHARM REV CODE 250: Performed by: INTERNAL MEDICINE

## 2020-08-09 RX ORDER — POTASSIUM CHLORIDE 20 MEQ/1
40 TABLET, EXTENDED RELEASE ORAL 2 TIMES DAILY
Status: DISCONTINUED | OUTPATIENT
Start: 2020-08-09 | End: 2020-08-10

## 2020-08-09 RX ADMIN — SODIUM CHLORIDE 500 ML: 0.9 SOLUTION INTRAVENOUS at 04:08

## 2020-08-09 RX ADMIN — PROPRANOLOL HYDROCHLORIDE 20 MG: 20 TABLET ORAL at 02:08

## 2020-08-09 RX ADMIN — PROPRANOLOL HYDROCHLORIDE 20 MG: 20 TABLET ORAL at 08:08

## 2020-08-09 RX ADMIN — VANCOMYCIN HYDROCHLORIDE 500 MG: 500 INJECTION, POWDER, LYOPHILIZED, FOR SOLUTION INTRAVENOUS at 09:08

## 2020-08-09 RX ADMIN — IBUPROFEN 300 MG: 100 SUSPENSION ORAL at 04:08

## 2020-08-09 RX ADMIN — BACLOFEN 10 MG: 10 TABLET ORAL at 08:08

## 2020-08-09 RX ADMIN — METRONIDAZOLE 500 MG: 500 TABLET ORAL at 09:08

## 2020-08-09 RX ADMIN — BACLOFEN 10 MG: 10 TABLET ORAL at 02:08

## 2020-08-09 RX ADMIN — PHENOBARBITAL 100 MG: 20 ELIXIR ORAL at 09:08

## 2020-08-09 RX ADMIN — METRONIDAZOLE 500 MG: 500 TABLET ORAL at 02:08

## 2020-08-09 RX ADMIN — POTASSIUM CHLORIDE 40 MEQ: 1500 TABLET, EXTENDED RELEASE ORAL at 09:08

## 2020-08-09 RX ADMIN — BACLOFEN 10 MG: 10 TABLET ORAL at 09:08

## 2020-08-09 RX ADMIN — GABAPENTIN 250 MG: 250 SOLUTION ORAL at 09:08

## 2020-08-09 RX ADMIN — LEVOFLOXACIN 500 MG: 500 TABLET, FILM COATED ORAL at 08:08

## 2020-08-09 RX ADMIN — ACETAMINOPHEN 650 MG: 325 TABLET ORAL at 05:08

## 2020-08-09 RX ADMIN — METRONIDAZOLE 500 MG: 500 TABLET ORAL at 05:08

## 2020-08-09 RX ADMIN — PROPRANOLOL HYDROCHLORIDE 20 MG: 20 TABLET ORAL at 09:08

## 2020-08-09 NOTE — PROGRESS NOTES
"[facility] Medicine  Progress Note    Patient Name: Glen Moscoso  MRN: [id number]  Patient Class: IP- Inpatient   Admission Date: 8/4/2020  Length of Stay: 4 days  Attending Physician: Anjelica Brandt MD  Primary Care Provider: Yadi Lawson MD        Subjective:     Principal Problem:Hypokalemia        HPI:  Patient presented to ER with fever and cough. Patient has cerebral palsy and is non-verbal. Mother provides history. He was seen at [facility] ER 2 weeks ago and diagnosed with bronchitis and strep throat. Treated with amoxil. He completed antibx 4 days ago. Mom reports loose watery stools 3-4x a day for almost 2 weeks now. She noted he had fever yesterday "He was burning up" but did not check with thermometer. She notes he is breathing a little heavier than normal. + cough. She does not think he is in any pain. No changes to tube feeds (Ensure 4x a day via PEG). No sick contacts or recent travel but was in [facility] ER and was hospitalized at [place] before that for "aspiration" and on antibx then. Patient has low grade temp 99.5. HR 120s. RR 30s. Sats 96% on RA. Rapid COVID negative however note mild elevation LFTs, LDH, ferritin pending. D-dimer elevated, CTA pending. Lactic acid and procalcitonin normal. Repeat COVID swab ordered. WBC 15K. UA not infectious. K 2.2--likely due to diarrhea. GFR > 60 (Cr 0.7 but very low muscle mass). CXR clear. Flu and strep negative. Admitted for treatment of hypokalemia and possible C. Diff vs COVID vs PE.     Overview/Hospital Course:  CTA done this am negative for PE. POX 96% RR 18. He is still tachycardiac. With low grade fever. And diarrhea persist. K+ is better after 5-10meq KCL riders and 20meq via g tube.     8/6: COVID negative. Still having diarrhea. Still tachycardia (rate 120s and regular); mom doesn't know if this is his baseline. RR back to normal. He is more alert and interactive with TV. K is normal. Stool studies still pending. C. Diff " negative.     8/7/2020 Blood Cx negative x 3d- but WBC trending up WBC 12.08> 14.45, will add Cipro , repeat CXR   Patient had 3 loose stools last shift/voiding per diaper. Stool was sent to lab for ova and parasites as ordered, c-diff negative    K+ 4.2   Getting IVFs @ 150ml/hr , HR Remains 114 sinus tach    8/8  Fever overnight. More agitated. HR up to 150s. Given propranolol and HR down to 100s. Less agitated. Slept okay. Still with foul smelling stool.    8/9  Switched from cipro to levaquin to cover for potential right lower lobe developing infiltrate. However, last night, he had higher fever. Discussed at length with mom that he has been on >10 rounds of different antibiotics over the last 2 months. Not always with a definitive diagnosis. Currently, he is still having foul smelling abnormally colored stools. He is non verbal and does not grimace with movement of his arms or legs or head. He also is unphased by abdominal exam.      Review of Systems   Unable to perform ROS: Dementia     Objective:     Vital Signs (Most Recent):  Temp: 99.5 °F (37.5 °C) (08/09/20 1140)  Pulse: 109 (08/09/20 1234)  Resp: (!) 30 (08/09/20 1140)  BP: 114/69 (08/09/20 1140)  SpO2: 96 % (08/09/20 1140) Vital Signs (24h Range):  Temp:  [98 °F (36.7 °C)-102.8 °F (39.3 °C)] 99.5 °F (37.5 °C)  Pulse:  [107-135] 109  Resp:  [18-30] 30  SpO2:  [93 %-98 %] 96 %  BP: (110-123)/(61-71) 114/69     Weight: 35.4 kg (78 lb 0.7 oz)  Body mass index is 16.89 kg/m².    Physical Exam  Vitals signs and nursing note reviewed.   Constitutional:       Appearance: He is well-developed.   HENT:      Head: Normocephalic and atraumatic.      Right Ear: External ear normal.      Left Ear: External ear normal.      Nose: Nose normal.   Eyes:      General:         Right eye: No discharge.         Left eye: No discharge.      Conjunctiva/sclera: Conjunctivae normal.      Pupils: Pupils are equal, round, and reactive to light.      Comments: Strabismus right eye    Neck:      Musculoskeletal: Neck supple.      Thyroid: No thyromegaly.   Cardiovascular:      Rate and Rhythm: Tachycardia present.      Heart sounds: No murmur. No friction rub. No gallop.       Comments: Tachycardia, regular  Pulmonary:      Effort: Pulmonary effort is normal. No respiratory distress.      Breath sounds: Normal breath sounds. No wheezing or rales.   Abdominal:      General: Abdomen is flat. Bowel sounds are normal. There is no distension.      Palpations: Abdomen is soft.      Tenderness: There is no abdominal tenderness.      Comments: PEG in place with mild redness, no drainage    Foul smelling stool   Musculoskeletal:      Right lower leg: No edema.      Left lower leg: No edema.      Comments: Muscle atrophy  contractures   Lymphadenopathy:      Cervical: No cervical adenopathy.   Skin:     General: Skin is warm and dry.   Neurological:      Mental Status: He is alert.      Comments: Patient alert but otherwise not responsive and can not cooperate in exam, severe CP   Psychiatric:      Comments: Uneasy in bed. Rotating around           CRANIAL NERVES     CN III, IV, VI   Pupils are equal, round, and reactive to light.       Significant Labs: covid screen negative-- repeat pending   CBC:   Recent Labs   Lab 08/08/20  0655 08/09/20  0712   WBC 15.04* 9.42   HGB 11.2* 10.9*   HCT 36.4* 34.7*    397*     CMP:   Recent Labs   Lab 08/08/20  0655 08/09/20  0712    146*   K 3.8 3.4*    113*   CO2 24 23   GLU 90 93   BUN 8 11   CREATININE 0.6 0.7   CALCIUM 8.8 8.3*   PROT 6.7 6.7   ALBUMIN 2.8* 2.7*   BILITOT 0.3 0.2   ALKPHOS 96 82   AST 65* 40   ALT 69* 61*   ANIONGAP 12 10   EGFRNONAA >60 >60     8/4 phenobarb 25.8 .    CRP 58.1    procal 0.21  Lactic acid 1.3    Urine Studies:   Recent Labs   Lab 08/09/20  0555   COLORU Yellow   APPEARANCEUA Hazy*   PHUR 6.0   SPECGRAV >=1.030*   PROTEINUA 1+*   GLUCUA Negative   KETONESU Negative   BILIRUBINUA Negative   OCCULTUA 3+*    NITRITE Negative   UROBILINOGEN Negative   LEUKOCYTESUR Negative   RBCUA 40*   WBCUA 8*   BACTERIA Occasional   HYALINECASTS 2*     c diff negative .      Blood cultures NGTD- in process x 2     Strep and flu negative     Significant Imagin/4 CXR The lungs are clear, with normal appearance of pulmonary vasculature and no pleural effusion or pneumothorax.     The cardiac silhouette is normal in size. The hilar and mediastinal contours are unremarkable.     Bones are intact.     CTA chest     1. No evidence of acute cardiopulmonary embolus.  2. Still a zone of increased density identified in the patient's posterior gutter on the left 12 mm in size may reflect a early infiltrate or suspicious pulmonary nodule formation.  On any matter, short-term interval follow-up within a 6 month interval to document stability imaging findings is advised based on the Fleischner criteria.  3. 6 mm cyst projecting in the posterior interpolar region of the left kidney.  4. Prominent distention of the esophagus with evidence of retention of fluid that may reflect findings as a manifestation of achalasia with scleroderma as there is of moderate tapering of the distal esophagus at the level of the gastroesophageal junction.        EKG Sinus tachycardia  Otherwise normal ECG  When compared with ECG of 15-OCT-2019 18:43,  Nonspecific T wave abnormality now evident in Anterior leads      Assessment/Plan:      * Hypokalemia  Replace IV and via PEG today  Labs in AM  Likely due to severe diarrhea  telemetry  K+ better 2.2>3.3 after 5 >> 10meq IV riders in ER and 20meq po   Will repeat g tube this am 40meq po once this am    8/6: resolved. Stop K in fluids today. Still having diarrhea so will keep 1 more day and repeat labs in morning to be sure remains stable as this was his main reason for admission   K+ 4.2     Resolved      Replace via G tube. Worsening with diarrhea    Fever in adult  Not sure the cause of this.  In  the last 2 months he has been innudated with antibiotics.  Cultures seem to always be negative.  Will get echo tomorrow.  Abx increased to cover mrsa. Addition of levaquin did not help with fevers, but wbc ct did improve. Will cont both for now.  Broaden work up.   If no obvious etiology will d/w id.        Aspiration pneumonitis  Recently admitted to [place] with this diagnosis      Leukocytosis  Differential broad  Low grade fevers  C. Diff possible  CXR clear  UA clear  Aspiration in differential    Ruled out PE with CTA  Hold antibx for now as lactic acid and procal is normal but low threshold to start if any decompensation overnight     8/6: resolved without antibx treatment. Could be dehydration.     8/7  Slight bump again today. Start cipro flagyl and check cxr again.    8/9  Wbc ct improved today. But fevers still very high. Start on vanc (d/c levaquin) with recent hospital admission. Monitor fever curve.      Diarrhea of presumed infectious origin  Antibiotic associate diarrhea vs true C. Diff  C. Diff pending  If worsening clinical status will start PO Vanc for treatment; holding tonight as true clinical picture not yet clear    8/6:  Check stool studies--pending. C. Diff negative  Change NS 150cc/hr  8/7 Blood Cx negative, afebrile but WBC trending up; will add Cipro + flagyl and cont for 7 days  Stool for Ova/parasites     8/8  Stool studies all negative. Started on flagyl anyway. Will give him probiotics as well.    8/9  Add in rotavirus panel today. Continued on flagyl.    Tachypnea  Repeat COVID ordered; rapid negative but + cough, diarrhea, mild LFT elevation  CXR without infiltrates  D-dimer elevated so checking CTA for PE tonight. Will start heparin gtt if CTA positive  No antibx for now with normal procal but aspiration in differential as well so could add Zosyn if needed  8/5: POX 96% on RA and RR down to 18/min. Maybe dehydration/hypoK played a role here    8/6: resolved. COVID negative. RR 18  from 30-40 on admit.   8/7 remains with - check EKG verify sinus tach     8/8  CXR looks like he has a developing infiltrate. Levaquin started today. Has fever, increased wbc ct and now infiltrate. Reorder blood culture.    8/9  Consulted pulm. Will get echo tomorrow and consult cards.     Sinus tachycardia  NS 10cc/hr  Likely some dehydration from frequent diarrhea (very low muscle mass so I trust Cr less)  D-dimer elevated so assessing for PE as well  CTA negative PE    Mom unsure his baseline heart rate.     Started on propranolol yesterday    8/9  With continued tachycardia and fever. Given single 500 cc bolus last night. Remains tachycardic. Worse with fevers    Cerebral palsy  Cont home phenobarbital and baclofen  Cont tube feeds per home routine via PEG  He is non-verbal and not mobile at baseline. He seems to be more interactive and grunting at TV today. Mom thinks this is his baseline        VTE Risk Mitigation (From admission, onward)         Ordered     IP VTE LOW RISK PATIENT  Once      08/04/20 0094                      Alexa Sotomayor MD  Department of Hospital Medicine   [facility]

## 2020-08-09 NOTE — PROGRESS NOTES
Pharmacokinetic Initial Assessment: IV Vancomycin    Assessment/Plan:    Patient will not receive a loading dose due to low body weight of 35.4 kg. Initiate Vancomycin 500 mg IV every 12 hours based on dosing guidelines of 15 mg/kg Q12.  Desired empiric serum trough concentration is 10 to 20 mcg/mL  Draw vancomycin trough level 30 min prior to fourth dose on 08/10/2020 at approximately 2000  Pharmacy will continue to follow and monitor vancomycin.      Please contact pharmacy at extension [phone] with any questions regarding this assessment.     Thank you for the consult,   Padmaja Hassan       Patient brief summary:  Glen Moscoso is a 24 y.o. male initiated on antimicrobial therapy with IV Vancomycin for treatment of suspected lower respiratory infection    Drug Allergies:   Review of patient's allergies indicates:  No Known Allergies    Actual Body Weight:   35.4 kg    Renal Function:   Estimated Creatinine Clearance: 96.7 mL/min (based on SCr of 0.6 mg/dL).,     Dialysis Method (if applicable):  N/A    CBC (last 72 hours):  Recent Labs   Lab Result Units 08/07/20  0623 08/08/20  0655   WBC K/uL 14.45* 15.04*   Hemoglobin g/dL 11.2* 11.2*   Hematocrit % 34.8* 36.4*   Platelets K/uL 316 257   Gran% % 72.1 73.7*   Lymph% % 11.7* 9.4*   Mono% % 6.1 7.0   Eosinophil% % 9.1* 9.0*   Basophil% % 0.2 0.3   Differential Method  Automated Automated       Metabolic Panel (last 72 hours):  Recent Labs   Lab Result Units 08/07/20  0623 08/08/20  0655   Sodium mmol/L 140 143   Potassium mmol/L 4.2 3.8   Chloride mmol/L 104 107   CO2 mmol/L 28 24   Glucose mg/dL 99 90   BUN, Bld mg/dL 5* 8   Creatinine mg/dL 0.5 0.6   Albumin g/dL 2.5* 2.8*   Total Bilirubin mg/dL 0.2 0.3   Alkaline Phosphatase U/L 90 96   AST U/L 25 65*   ALT U/L 35 69*       Drug levels (last 3 results):  No results for input(s): VANCOMYCINRA, VANCOMYCINPE, VANCOMYCINTR in the last 72 hours.    Microbiologic Results:  Microbiology Results (last 7 days)      Procedure Component Value Units Date/Time    Blood culture [[id number]] Collected: 08/08/20 1112    Order Status: Completed Specimen: Blood Updated: 08/09/20 0745     Blood Culture, Routine No Growth to date    Blood culture [[id number]] Collected: 08/08/20 1112    Order Status: Completed Specimen: Blood Updated: 08/09/20 0745     Blood Culture, Routine No Growth to date    Influenza A & B by Molecular [[id number]] Collected: 08/09/20 0618    Order Status: Sent Specimen: Nasopharyngeal Swab Updated: 08/09/20 0646    Respiratory Infection Panel (PCR), Nasopharyngeal [[id number]] Collected: 08/09/20 0619    Order Status: Sent Specimen: Nasopharyngeal Swab Updated: 08/09/20 0646    Blood Culture #1 **CANNOT BE ORDERED STAT** [[id number]] Collected: 08/04/20 1223    Order Status: Completed Specimen: Blood from Antecubital, Right Arm Updated: 08/08/20 2012     Blood Culture, Routine No Growth to date      No Growth to date      No Growth to date      No Growth to date      No Growth to date    Blood Culture #1 **CANNOT BE ORDERED STAT** [[id number]] Collected: 08/04/20 1228    Order Status: Completed Specimen: Blood from Antecubital, Left Arm Updated: 08/08/20 2012     Blood Culture, Routine No Growth to date      No Growth to date      No Growth to date      No Growth to date      No Growth to date    E. coli 0157 antigen [[id number]] Collected: 08/05/20 1441    Order Status: Completed Specimen: Stool Updated: 08/07/20 1505     Shiga Toxin 1 E.coli Negative     Shiga Toxin 2 E.coli Negative    Stool culture [[id number]] Collected: 08/05/20 1441    Order Status: Completed Specimen: Stool Updated: 08/07/20 1349     Stool Culture Nothing significant to date    Clostridium difficile EIA [[id number]] Collected: 08/04/20 1810    Order Status: Completed Specimen: Stool Updated: 08/05/20 0413     C. diff Antigen Negative     C difficile Toxins A+B, EIA Negative     Comment: Testing not recommended for children <24 months old.        Influenza A & B by Molecular [[id number]] Collected: 08/04/20 1213    Order Status: Completed Specimen: Nasopharyngeal Swab Updated: 08/04/20 1253     Influenza A, Molecular Negative     Influenza B, Molecular Negative     Flu A & B Source Nasal swab    Group A Strep, Molecular [[id number]] Collected: 08/04/20 1213    Order Status: Completed Specimen: Throat Updated: 08/04/20 1251     Group A Strep, Molecular Negative

## 2020-08-09 NOTE — ASSESSMENT & PLAN NOTE
Not sure the cause of this.  In the last 2 months he has been innudated with antibiotics.  Cultures seem to always be negative.  Will get echo tomorrow.  Abx increased to cover mrsa. Addition of levaquin did not help with fevers, but wbc ct did improve. Will cont both for now.  Broaden work up.   If no obvious etiology will d/w id.

## 2020-08-09 NOTE — PLAN OF CARE
Patient is compliant with fluid and medication management.  Patient is compliant with with all lab testing and procedures.  Patient remains free from all falls and injury.  VSS.  Dr. Moraes consulted today.  Febrile.  Mother remains at bedside.  Plan of care reviewed and agreed upon with mother.  Will continue to monitor.

## 2020-08-09 NOTE — CONSULTS
[facility]  Pulmonology  Consult Note    Patient Name: Glen Moscoso  MRN: [id number]  Admission Date: 8/4/2020  Hospital Length of Stay: 4 days  Code Status: Full Code  Attending Physician: Anjelica Brandt MD  Primary Care Provider: Yadi Lawson MD   Principal Problem: Hypokalemia    Consults  Subjective:     HPI:  Glen Moscoso is a 24 y.o. year old male that's presents with a chief complaint of Fever with an abnormal CXR in the Right lower Lobe  for several days. Diarrhea for almost 2 weeks.Unclear of etiology of diarrhea .Also noted to have skin breakdown around the posterior scrotum .Has been on antibiotics several times for aspiration pneumonia has had several antibiotics in the last 2 months.Clostridium difficile is negative . Stool is watery and foul smelling .  Consulted to evaluate Respiratory status.    Past Medical History:   Diagnosis Date    Acid reflux     Cerebral palsy     History of hip surgery     S/P percutaneous endoscopic gastrostomy (PEG) tube placement     Seizures         History reviewed. No pertinent surgical history.    Prior to Admission medications    Medication Sig Start Date End Date Taking? Authorizing Provider   baclofen (LIORESAL) 10 MG tablet Take 10 mg by mouth 3 (three) times daily.   Yes Historical Provider, MD   diphenhydrAMINE (BENADRYL) 12.5 mg/5 mL elixir Take by mouth 4 (four) times daily as needed for Allergies.   Yes Historical Provider, MD   gabapentin (NEURONTIN) 250 mg/5 mL solution Take by mouth 3 (three) times daily.   Yes Historical Provider, MD   ibuprofen (ADVIL,MOTRIN) 100 mg/5 mL suspension Take by mouth every 6 (six) hours as needed for Temperature greater than.   Yes Historical Provider, MD   Lactobacillus rhamnosus GG (CULTURELLE) 10 billion cell capsule 1 capsule by Per G Tube route once daily.   Yes Historical Provider, MD   lactulose (CHRONULAC) 10 gram/15 mL solution Take by mouth 3 (three) times daily.   Yes Historical Provider, MD    nystatin (MYCOSTATIN) cream Apply topically 2 (two) times daily.   Yes Historical Provider, MD       Social History     Socioeconomic History    Marital status: Single     Spouse name: Not on file    Number of children: Not on file    Years of education: Not on file    Highest education level: Not on file   Occupational History    Not on file   Social Needs    Financial resource strain: Not on file    Food insecurity     Worry: Not on file     Inability: Not on file    Transportation needs     Medical: Not on file     Non-medical: Not on file   Tobacco Use    Smoking status: Never Smoker    Smokeless tobacco: Never Used   Substance and Sexual Activity    Alcohol use: Never     Frequency: Never    Drug use: Not Currently    Sexual activity: Not on file   Lifestyle    Physical activity     Days per week: Not on file     Minutes per session: Not on file    Stress: Not on file   Relationships    Social connections     Talks on phone: Not on file     Gets together: Not on file     Attends [employer] service: Not on file     Active member of club or organization: Not on file     Attends meetings of clubs or organizations: Not on file     Relationship status: Not on file   Other Topics Concern    Not on file   Social History Narrative    Not on file       History reviewed. No pertinent family history.    Review of patient's allergies indicates:  No Known Allergies Allergies have been reviewed.     ROS: ROS    PE:   Vitals:    08/09/20 0801 08/09/20 1009 08/09/20 1140 08/09/20 1234   BP:   114/69    BP Location:   Right arm    Patient Position:   Lying    Pulse: (!) 120 108 107 109   Resp:   (!) 30    Temp: (!) 100.9 °F (38.3 °C)  99.5 °F (37.5 °C)    TempSrc:   Axillary    SpO2:   96%    Weight:       Height:        Physical Exam    Alert and orientated X 3   HEENT: Head: Normocephalic no trauma                Ears : Normal Pinna No Drainage no Battles sign                Eyes: Vision Unchanged, No  conjunctivitis,No drainage                Neck: Supple, No JVD,No Abnormal Carotid Pulsations                Throat: No Erythema, No pus,No Swelling,Mallampati score= 3    Chest: Course bs bilaterally with scattered intermittent rhonchi   Cardiac: RRR S1+ S2 with a -S3: +M = 2/6, No R/H/G  Abdomen: Bowel Sounds are Normal.Soft Abdomen. No organomegaly of Liver,Spleen,or Kidneys   CNS: Non focal and intact. Cranial nerves 2, 346,8,9,10 and 12 are normal.Norrmal gait.Normal posture.  Extremities: No Clubbing,No Cyanosis with oxygen,Positive mild edema of lower extremities Bilateral  Skin: No Rash, No Ulcerative sores,and No cellulitis of the IV site.    Lab Results   Component Value Date    WBC 9.42 08/09/2020    HGB 10.9 (L) 08/09/2020    HCT 34.7 (L) 08/09/2020     (H) 08/09/2020    ALT 61 (H) 08/09/2020    AST 40 08/09/2020     (H) 08/09/2020    K 3.4 (L) 08/09/2020     (H) 08/09/2020    CREATININE 0.7 08/09/2020    BUN 11 08/09/2020    CO2 23 08/09/2020               Assessment/Plan:     Aspiration pneumonitis  History in the past     Leukocytosis  stable    Diarrhea of presumed infectious origin  Diarrhea for 2 weeks watery     Sinus tachycardia  Mild dehydration     Cerebral palsy  Non communicating     suggest stool for fecal fat looking for malabsorption since there is a foul smelling stool could also be cholera but cultures are negative .no c. Diff.   Cardiac ultrasound to r/o endocarditis and an esr       Thank you for your consult. I will follow-up with patient. Please contact us if you have any additional questions.     Kar Moraes MD  Pulmonology  [facility]

## 2020-08-09 NOTE — ASSESSMENT & PLAN NOTE
Antibiotic associate diarrhea vs true C. Diff  C. Diff pending  If worsening clinical status will start PO Vanc for treatment; holding tonight as true clinical picture not yet clear    8/6:  Check stool studies--pending. C. Diff negative  Change NS 150cc/hr  8/7 Blood Cx negative, afebrile but WBC trending up; will add Cipro + flagyl and cont for 7 days  Stool for Ova/parasites     8/8  Stool studies all negative. Started on flagyl anyway. Will give him probiotics as well.    8/9  Add in rotavirus panel today. Continued on flagyl.

## 2020-08-09 NOTE — ASSESSMENT & PLAN NOTE
Repeat COVID ordered; rapid negative but + cough, diarrhea, mild LFT elevation  CXR without infiltrates  D-dimer elevated so checking CTA for PE tonight. Will start heparin gtt if CTA positive  No antibx for now with normal procal but aspiration in differential as well so could add Zosyn if needed  8/5: POX 96% on RA and RR down to 18/min. Maybe dehydration/hypoK played a role here    8/6: resolved. COVID negative. RR 18 from 30-40 on admit.   8/7 remains with - check EKG verify sinus tach     8/8  CXR looks like he has a developing infiltrate. Levaquin started today. Has fever, increased wbc ct and now infiltrate. Reorder blood culture.    8/9  Consulted pulm. Will get echo tomorrow and consult cards.

## 2020-08-09 NOTE — ASSESSMENT & PLAN NOTE
Differential broad  Low grade fevers  C. Diff possible  CXR clear  UA clear  Aspiration in differential    Ruled out PE with CTA  Hold antibx for now as lactic acid and procal is normal but low threshold to start if any decompensation overnight     8/6: resolved without antibx treatment. Could be dehydration.     8/7  Slight bump again today. Start cipro flagyl and check cxr again.    8/9  Wbc ct improved today. But fevers still very high. Start on vanc (d/c levaquin) with recent hospital admission. Monitor fever curve.

## 2020-08-09 NOTE — ASSESSMENT & PLAN NOTE
Replace IV and via PEG today  Labs in AM  Likely due to severe diarrhea  telemetry  K+ better 2.2>3.3 after 5 >> 10meq IV riders in ER and 20meq po   Will repeat g tube this am 40meq po once this am    8/6: resolved. Stop K in fluids today. Still having diarrhea so will keep 1 more day and repeat labs in morning to be sure remains stable as this was his main reason for admission  8/7 K+ 4.2     Resolved    8/9  Replace via G tube. Worsening with diarrhea

## 2020-08-09 NOTE — SUBJECTIVE & OBJECTIVE
Review of Systems   Unable to perform ROS: Dementia     Objective:     Vital Signs (Most Recent):  Temp: 99.5 °F (37.5 °C) (08/09/20 1140)  Pulse: 109 (08/09/20 1234)  Resp: (!) 30 (08/09/20 1140)  BP: 114/69 (08/09/20 1140)  SpO2: 96 % (08/09/20 1140) Vital Signs (24h Range):  Temp:  [98 °F (36.7 °C)-102.8 °F (39.3 °C)] 99.5 °F (37.5 °C)  Pulse:  [107-135] 109  Resp:  [18-30] 30  SpO2:  [93 %-98 %] 96 %  BP: (110-123)/(61-71) 114/69     Weight: 35.4 kg (78 lb 0.7 oz)  Body mass index is 16.89 kg/m².    Physical Exam  Vitals signs and nursing note reviewed.   Constitutional:       Appearance: He is well-developed.   HENT:      Head: Normocephalic and atraumatic.      Right Ear: External ear normal.      Left Ear: External ear normal.      Nose: Nose normal.   Eyes:      General:         Right eye: No discharge.         Left eye: No discharge.      Conjunctiva/sclera: Conjunctivae normal.      Pupils: Pupils are equal, round, and reactive to light.      Comments: Strabismus right eye   Neck:      Musculoskeletal: Neck supple.      Thyroid: No thyromegaly.   Cardiovascular:      Rate and Rhythm: Tachycardia present.      Heart sounds: No murmur. No friction rub. No gallop.       Comments: Tachycardia, regular  Pulmonary:      Effort: Pulmonary effort is normal. No respiratory distress.      Breath sounds: Normal breath sounds. No wheezing or rales.   Abdominal:      General: Abdomen is flat. Bowel sounds are normal. There is no distension.      Palpations: Abdomen is soft.      Tenderness: There is no abdominal tenderness.      Comments: PEG in place with mild redness, no drainage    Foul smelling stool   Musculoskeletal:      Right lower leg: No edema.      Left lower leg: No edema.      Comments: Muscle atrophy  contractures   Lymphadenopathy:      Cervical: No cervical adenopathy.   Skin:     General: Skin is warm and dry.   Neurological:      Mental Status: He is alert.      Comments: Patient alert but otherwise  not responsive and can not cooperate in exam, severe CP   Psychiatric:      Comments: Uneasy in bed. Rotating around           CRANIAL NERVES     CN III, IV, VI   Pupils are equal, round, and reactive to light.       Significant Labs: covid screen negative-- repeat pending   CBC:   Recent Labs   Lab 20  0655 20  0712   WBC 15.04* 9.42   HGB 11.2* 10.9*   HCT 36.4* 34.7*    397*     CMP:   Recent Labs   Lab 20  0655 20  0712    146*   K 3.8 3.4*    113*   CO2 24 23   GLU 90 93   BUN 8 11   CREATININE 0.6 0.7   CALCIUM 8.8 8.3*   PROT 6.7 6.7   ALBUMIN 2.8* 2.7*   BILITOT 0.3 0.2   ALKPHOS 96 82   AST 65* 40   ALT 69* 61*   ANIONGAP 12 10   EGFRNONAA >60 >60      phenobarb 25.8 .    CRP 58.1    procal 0.21  Lactic acid 1.3    Urine Studies:   Recent Labs   Lab 20  0555   COLORU Yellow   APPEARANCEUA Hazy*   PHUR 6.0   SPECGRAV >=1.030*   PROTEINUA 1+*   GLUCUA Negative   KETONESU Negative   BILIRUBINUA Negative   OCCULTUA 3+*   NITRITE Negative   UROBILINOGEN Negative   LEUKOCYTESUR Negative   RBCUA 40*   WBCUA 8*   BACTERIA Occasional   HYALINECASTS 2*     c diff negative .      Blood cultures NGTD- in process x 2     Strep and flu negative     Significant Imagin/4 CXR The lungs are clear, with normal appearance of pulmonary vasculature and no pleural effusion or pneumothorax.     The cardiac silhouette is normal in size. The hilar and mediastinal contours are unremarkable.     Bones are intact.     CTA chest     1. No evidence of acute cardiopulmonary embolus.  2. Still a zone of increased density identified in the patient's posterior gutter on the left 12 mm in size may reflect a early infiltrate or suspicious pulmonary nodule formation.  On any matter, short-term interval follow-up within a 6 month interval to document stability imaging findings is advised based on the Fleischner criteria.  3. 6 mm cyst projecting in the posterior interpolar  region of the left kidney.  4. Prominent distention of the esophagus with evidence of retention of fluid that may reflect findings as a manifestation of achalasia with scleroderma as there is of moderate tapering of the distal esophagus at the level of the gastroesophageal junction.       8/5 EKG Sinus tachycardia  Otherwise normal ECG  When compared with ECG of 15-OCT-2019 18:43,  Nonspecific T wave abnormality now evident in Anterior leads

## 2020-08-09 NOTE — ASSESSMENT & PLAN NOTE
NS 10cc/hr  Likely some dehydration from frequent diarrhea (very low muscle mass so I trust Cr less)  D-dimer elevated so assessing for PE as well  CTA negative PE    Mom unsure his baseline heart rate.     Started on propranolol yesterday    8/9  With continued tachycardia and fever. Given single 500 cc bolus last night. Remains tachycardic. Worse with fevers

## 2020-08-10 LAB
ALBUMIN SERPL BCP-MCNC: 2.6 G/DL (ref 3.5–5.2)
ALP SERPL-CCNC: 74 U/L (ref 55–135)
ALT SERPL W/O P-5'-P-CCNC: 49 U/L (ref 10–44)
ANION GAP SERPL CALC-SCNC: 10 MMOL/L (ref 8–16)
ANISOCYTOSIS BLD QL SMEAR: SLIGHT
AORTIC ROOT ANNULUS: 2.38 CM
AST SERPL-CCNC: 28 U/L (ref 10–40)
BACTERIA STL CULT: NORMAL
BASOPHILS # BLD AUTO: ABNORMAL K/UL (ref 0–0.2)
BASOPHILS NFR BLD: 0 % (ref 0–1.9)
BILIRUB SERPL-MCNC: 0.2 MG/DL (ref 0.1–1)
BSA FOR ECHO PROCEDURE: 1.19 M2
BUN SERPL-MCNC: 11 MG/DL (ref 6–20)
CALCIUM SERPL-MCNC: 8.5 MG/DL (ref 8.7–10.5)
CHLORIDE SERPL-SCNC: 113 MMOL/L (ref 95–110)
CO2 SERPL-SCNC: 22 MMOL/L (ref 23–29)
CREAT SERPL-MCNC: 0.7 MG/DL (ref 0.5–1.4)
CV ECHO LV RWT: 0.35 CM
DIFFERENTIAL METHOD: ABNORMAL
DOP CALC LVOT AREA: 3 CM2
DOP CALC LVOT DIAMETER: 1.96 CM
ECHO LV POSTERIOR WALL: 0.64 CM (ref 0.6–1.1)
EOSINOPHIL # BLD AUTO: ABNORMAL K/UL (ref 0–0.5)
EOSINOPHIL NFR BLD: 8 % (ref 0–8)
ERYTHROCYTE [DISTWIDTH] IN BLOOD BY AUTOMATED COUNT: 15.1 % (ref 11.5–14.5)
EST. GFR  (AFRICAN AMERICAN): >60 ML/MIN/1.73 M^2
EST. GFR  (NON AFRICAN AMERICAN): >60 ML/MIN/1.73 M^2
FRACTIONAL SHORTENING: 48 % (ref 28–44)
GLUCOSE SERPL-MCNC: 89 MG/DL (ref 70–110)
HAV IGM SERPL QL IA: NEGATIVE
HBV CORE IGM SERPL QL IA: NEGATIVE
HBV SURFACE AG SERPL QL IA: NEGATIVE
HCT VFR BLD AUTO: 39.1 % (ref 40–54)
HCV AB SERPL QL IA: NEGATIVE
HGB BLD-MCNC: 12.2 G/DL (ref 14–18)
IMM GRANULOCYTES # BLD AUTO: ABNORMAL K/UL (ref 0–0.04)
IMM GRANULOCYTES NFR BLD AUTO: ABNORMAL % (ref 0–0.5)
INTERVENTRICULAR SEPTUM: 0.8 CM (ref 0.6–1.1)
LA MAJOR: 3.18 CM
LA WIDTH: 2.78 CM
LEFT ATRIUM SIZE: 2.43 CM
LEFT INTERNAL DIMENSION IN SYSTOLE: 1.88 CM (ref 2.1–4)
LEFT VENTRICLE DIASTOLIC VOLUME INDEX: 46.69 ML/M2
LEFT VENTRICLE DIASTOLIC VOLUME: 56.28 ML
LEFT VENTRICLE MASS INDEX: 58 G/M2
LEFT VENTRICLE SYSTOLIC VOLUME INDEX: 9.1 ML/M2
LEFT VENTRICLE SYSTOLIC VOLUME: 10.92 ML
LEFT VENTRICULAR INTERNAL DIMENSION IN DIASTOLE: 3.65 CM (ref 3.5–6)
LEFT VENTRICULAR MASS: 69.86 G
LYMPHOCYTES # BLD AUTO: ABNORMAL K/UL (ref 1–4.8)
LYMPHOCYTES NFR BLD: 23 % (ref 18–48)
MCH RBC QN AUTO: 28.9 PG (ref 27–31)
MCHC RBC AUTO-ENTMCNC: 31.2 G/DL (ref 32–36)
MCV RBC AUTO: 93 FL (ref 82–98)
MONOCYTES # BLD AUTO: ABNORMAL K/UL (ref 0.3–1)
MONOCYTES NFR BLD: 7 % (ref 4–15)
NEUTROPHILS NFR BLD: 47 % (ref 38–73)
NEUTS BAND NFR BLD MANUAL: 15 %
NRBC BLD-RTO: 0 /100 WBC
O+P STL MICRO: NORMAL
PISA TR MAX VEL: 1.81 M/S
PLATELET # BLD AUTO: 392 K/UL (ref 150–350)
PLATELET BLD QL SMEAR: ABNORMAL
PMV BLD AUTO: 9.7 FL (ref 9.2–12.9)
POCT GLUCOSE: 103 MG/DL (ref 70–110)
POCT GLUCOSE: 73 MG/DL (ref 70–110)
POCT GLUCOSE: 84 MG/DL (ref 70–110)
POCT GLUCOSE: 86 MG/DL (ref 70–110)
POTASSIUM SERPL-SCNC: 3.2 MMOL/L (ref 3.5–5.1)
PROT SERPL-MCNC: 6.9 G/DL (ref 6–8.4)
PV PEAK VELOCITY: 0 CM/S
RA MAJOR: 3.8 CM
RBC # BLD AUTO: 4.22 M/UL (ref 4.6–6.2)
RIGHT VENTRICULAR END-DIASTOLIC DIMENSION: 2.24 CM
SODIUM SERPL-SCNC: 145 MMOL/L (ref 136–145)
TR MAX PG: 13 MMHG
VANCOMYCIN TROUGH SERPL-MCNC: 6.4 UG/ML (ref 10–22)
WBC # BLD AUTO: 13.68 K/UL (ref 3.9–12.7)

## 2020-08-10 PROCEDURE — 63600175 PHARM REV CODE 636 W HCPCS: Performed by: INTERNAL MEDICINE

## 2020-08-10 PROCEDURE — 99233 SBSQ HOSP IP/OBS HIGH 50: CPT | Mod: ,,, | Performed by: FAMILY MEDICINE

## 2020-08-10 PROCEDURE — 25000003 PHARM REV CODE 250: Performed by: NURSE PRACTITIONER

## 2020-08-10 PROCEDURE — 11000001 HC ACUTE MED/SURG PRIVATE ROOM

## 2020-08-10 PROCEDURE — 80202 ASSAY OF VANCOMYCIN: CPT

## 2020-08-10 PROCEDURE — 85027 COMPLETE CBC AUTOMATED: CPT

## 2020-08-10 PROCEDURE — 85007 BL SMEAR W/DIFF WBC COUNT: CPT

## 2020-08-10 PROCEDURE — 99900035 HC TECH TIME PER 15 MIN (STAT)

## 2020-08-10 PROCEDURE — 94761 N-INVAS EAR/PLS OXIMETRY MLT: CPT

## 2020-08-10 PROCEDURE — 25000003 PHARM REV CODE 250: Performed by: FAMILY MEDICINE

## 2020-08-10 PROCEDURE — 99233 PR SUBSEQUENT HOSPITAL CARE,LEVL III: ICD-10-PCS | Mod: ,,, | Performed by: FAMILY MEDICINE

## 2020-08-10 PROCEDURE — 80053 COMPREHEN METABOLIC PANEL: CPT

## 2020-08-10 PROCEDURE — 25000003 PHARM REV CODE 250: Performed by: INTERNAL MEDICINE

## 2020-08-10 PROCEDURE — 36415 COLL VENOUS BLD VENIPUNCTURE: CPT

## 2020-08-10 PROCEDURE — 89125 SPECIMEN FAT STAIN: CPT

## 2020-08-10 RX ORDER — POTASSIUM CHLORIDE 1.5 G/1.58G
40 POWDER, FOR SOLUTION ORAL 2 TIMES DAILY
Status: COMPLETED | OUTPATIENT
Start: 2020-08-10 | End: 2020-08-10

## 2020-08-10 RX ORDER — POTASSIUM CHLORIDE 20 MEQ/1
40 TABLET, EXTENDED RELEASE ORAL DAILY
Status: DISCONTINUED | OUTPATIENT
Start: 2020-08-10 | End: 2020-08-10

## 2020-08-10 RX ADMIN — VANCOMYCIN HYDROCHLORIDE 750 MG: 750 INJECTION, POWDER, LYOPHILIZED, FOR SOLUTION INTRAVENOUS at 09:08

## 2020-08-10 RX ADMIN — POTASSIUM CHLORIDE 40 MEQ: 1.5 POWDER, FOR SOLUTION ORAL at 09:08

## 2020-08-10 RX ADMIN — GABAPENTIN 250 MG: 250 SOLUTION ORAL at 09:08

## 2020-08-10 RX ADMIN — PROPRANOLOL HYDROCHLORIDE 20 MG: 20 TABLET ORAL at 09:08

## 2020-08-10 RX ADMIN — METRONIDAZOLE 500 MG: 500 TABLET ORAL at 02:08

## 2020-08-10 RX ADMIN — IBUPROFEN 300 MG: 100 SUSPENSION ORAL at 01:08

## 2020-08-10 RX ADMIN — VANCOMYCIN HYDROCHLORIDE 500 MG: 500 INJECTION, POWDER, LYOPHILIZED, FOR SOLUTION INTRAVENOUS at 09:08

## 2020-08-10 RX ADMIN — METRONIDAZOLE 500 MG: 500 TABLET ORAL at 05:08

## 2020-08-10 RX ADMIN — METRONIDAZOLE 500 MG: 500 TABLET ORAL at 09:08

## 2020-08-10 RX ADMIN — BACLOFEN 10 MG: 10 TABLET ORAL at 09:08

## 2020-08-10 RX ADMIN — LEVOFLOXACIN 500 MG: 500 TABLET, FILM COATED ORAL at 09:08

## 2020-08-10 RX ADMIN — BACLOFEN 10 MG: 10 TABLET ORAL at 02:08

## 2020-08-10 RX ADMIN — PHENOBARBITAL 100 MG: 20 ELIXIR ORAL at 09:08

## 2020-08-10 RX ADMIN — PROPRANOLOL HYDROCHLORIDE 20 MG: 20 TABLET ORAL at 02:08

## 2020-08-10 NOTE — ASSESSMENT & PLAN NOTE
Not sure the cause of this.  In the last 2 months he has been innudated with antibiotics.  Cultures seem to always be negative.  Will get echo tomorrow.  Abx increased to cover mrsa. Addition of levaquin did not help with fevers, but wbc ct did improve. Will cont both for now.  Broaden work up.   If no obvious etiology will d/w id.  8/10 A little better with Vanc - no BM over night, afebrile this am   Echo pending,

## 2020-08-10 NOTE — ASSESSMENT & PLAN NOTE
Recently admitted to [place] with this diagnosis  Started on Vanc yesterday ? RLL pneumonia   Not really convinced of this. First xr with questionable right sided changes. Repeat cxr clear.

## 2020-08-10 NOTE — ASSESSMENT & PLAN NOTE
Differential broad  Low grade fevers  C. Diff possible  CXR clear  UA clear  Aspiration in differential    Ruled out PE with CTA  Hold antibx for now as lactic acid and procal is normal but low threshold to start if any decompensation overnight     8/6: resolved without antibx treatment. Could be dehydration.     8/7  Slight bump again today. Start cipro flagyl and check cxr again.    8/9  Wbc ct improved today. But fevers still very high. Start on vanc (d/c levaquin) with recent hospital admission. Monitor fever curve.  8/10 WBC 9.42> 13.68, Resp infection panel  and flu swab negative, Blood Cx - NGTD x 2 d   vanc started - afebrile this am,

## 2020-08-10 NOTE — PROGRESS NOTES
"[facility]  Adult Nutrition  Progress Note    SUMMARY       Recommendations    Recommendation:   1. Continue Isosource 1.5 Bolus Feeds @ 4 cans/day to provide 1500 kcals, 68 g pro, and 764 mL enteral fluid      A. Fluid flush of 160 mL q6hrs or per MD   2. Please put tube feeding order in EPIC   3. Consider probiotics to help with diarrhea    Interventions:  Enteral nutrition  Collaboration with other providers    Goals: tolerate bolus feeding at goal rate by f/u; meet >85% of needs by discharge  Nutrition Goal Status: goal met, new  Communication of RD Recs: reviewed with RN, POC    Reason for Assessment    Reason For Assessment: RD follow-up  Diagnosis: (Hypokalemia)  Relevant Medical History: acid reflux, cerebral palsy, PEG tube, seizures    General Information Comments: Pt tolerating PEG feedings. Pt having diarrhea for the past 2 weeks, MD note suggests he has been on >10 rounds of different antibiotics over the last 2 months. NFPE not completed today 2/2 to patient being tested for COVID-19; will f/u as appropriate.    Nutrition Discharge Planning: nutrition through PEG    Nutrition Risk Screen    Nutrition Risk Screen: tube feeding or parenteral nutrition    Nutrition/Diet History    Patient Reported Diet/Restrictions/Preferences: no oral intake  Food Allergies: NKFA  Factors Affecting Nutritional Intake: NPO  Nutrition Support Formula Prior to Admit: Other (see comments)(Ensure)  Nutrtion Support Frequency Prior to Admit: 4 cartons/day    Anthropometrics    Temp: 99.6 °F (37.6 °C)  Height: 4' 9" (144.8 cm)  Height (inches): 57 in  Weight Method: Bed Scale  Weight: 35.4 kg (78 lb 0.7 oz)  Weight (lb): 78.04 lb  Ideal Body Weight (IBW), Male: 88 lb  % Ideal Body Weight, Male (lb): 88.68 %  BMI (Calculated): 16.9  BMI Grade: 16 - 16.9 protein-energy malnutrition grade II     Lab/Procedures/Meds    Pertinent Labs Reviewed: reviewed  Pertinent Labs Comments: potassium 3.2, albumin 2.6, ALT " 49  Pertinent Medications Reviewed: reviewed  Pertinent Medications Comments: gabapentin, potassium chloride, vancomycin    Estimated/Assessed Needs    Weight Used For Calorie Calculations: 35.4 kg (78 lb)  Energy Calorie Requirements (kcal): 1400  Energy Need Method: [place]-St Jeor(*1.2)  Protein Requirements: 35-42 g(1-1.2 g/kg)  Weight Used For Protein Calculations: 35.4 kg (78 lb)     Estimated Fluid Requirement Method: RDA Method  RDA Method (mL): 1400     Nutrition Prescription Ordered    Current Diet Order: NPO  Current Nutrition Support Formula Ordered: Isosource 1.5  Current Nutrition Support Frequency Ordered: 4 cans/d    Evaluation of Received Nutrient/Fluid Intake    Enteral Calories (kcal): 1500  Enteral Protein (gm): 68  Enteral (Free Water) Fluid (mL): 764  IV Fluid (mL): (NS @ 125 mL/hr)  I/O: +17.5 L since admit  Energy Calories Required: meeting needs  Protein Required: meeting needs  Fluid Required: meeting needs  Comments: LBM 8/8  Tolerance: tolerating    Nutrition Risk    Level of Risk/Frequency of Follow-up: moderate(1-2x/wk)     Assessment and Plan  Nutrition Problem:  Inadequate oral intake     Related to (etiology):   Cerebral palsy     Signs and Symptoms (as evidenced by):   Nutrition through PEG     Interventions:  Enteral nutrition  Collaboration with other providers     Nutrition Diagnosis Status:   Continues    Monitor and Evaluation    Food and Nutrient Intake: enteral nutrition intake, energy intake  Food and Nutrient Adminstration: enteral and parenteral nutrition administration  Anthropometric Measurements: weight, weight change  Biochemical Data, Medical Tests and Procedures: electrolyte and renal panel, gastrointestinal profile, glucose/endocrine profile, lipid profile, inflammatory profile  Nutrition-Focused Physical Findings: overall appearance     Malnutrition Assessment  Malnutrition Type: (NFPE not completed 2/2 COVID-19 clinically indicated, no signs of malnutrition at  this time)    Nutrition Follow-Up    RD Follow-up?: Yes

## 2020-08-10 NOTE — ASSESSMENT & PLAN NOTE
NS 10cc/hr  Likely some dehydration from frequent diarrhea (very low muscle mass so I trust Cr less)  D-dimer elevated so assessing for PE as well  CTA negative PE    Mom unsure his baseline heart rate.     Started on propranolol yesterday    8/9  With continued tachycardia and fever. Given single 500 cc bolus last night. Remains tachycardic. Worse with fevers    8/10  Improved with propranolol and vanc.

## 2020-08-10 NOTE — PLAN OF CARE
Patient resting with mom at bedside. Patient HR 90's-110's. Fever at 101.2 noted, gave Motrin and came down to 98.8 on recheck. otherwise VS stable. Peg tube patent. Tolerating feedings and medicines through tube. IV  vanc given as ordered. No acute changes noted. Plan of care reviewed with parent and agreed upon.

## 2020-08-10 NOTE — ASSESSMENT & PLAN NOTE
Replace IV and via PEG today  Labs in AM  Likely due to severe diarrhea  telemetry  K+ better 2.2>3.3 after 5 >> 10meq IV riders in ER and 20meq po   Will repeat g tube this am 40meq po once this am    8/6: resolved. Stop K in fluids today. Still having diarrhea so will keep 1 more day and repeat labs in morning to be sure remains stable as this was his main reason for admission  8/7 K+ 4.2     Resolved    8/9  Replace via G tube. Worsening with diarrhea  8/10 change KCL to powder for PEG tube

## 2020-08-10 NOTE — PLAN OF CARE
Recommendation:   1. Continue Isosource 1.5 Bolus Feeds @ 4 cans/day to provide 1500 kcals, 68 g pro, and 764 mL enteral fluid      A. Fluid flush of 160 mL q6hrs or per MD   2. Please put tube feeding order in EPIC   3. Consider probiotics to help with diarrhea    Interventions:  Enteral nutrition  Collaboration with other providers    Goals: tolerate bolus feeding at goal rate by f/u; meet >85% of needs by discharge  Nutrition Goal Status: goal met, new  Nutrition Discharge Planning: nutrition through PEG

## 2020-08-10 NOTE — PLAN OF CARE
08/10/20 1348   Discharge Reassessment   Assessment Type Discharge Planning Reassessment   Provided patient/caregiver education on the expected discharge date and the discharge plan Yes   Do you have any problems affording any of your prescribed medications? No   Discharge Plan A Home Health   DME Needed Upon Discharge  none   Anticipated Discharge Disposition Home-Health   Can the patient/caregiver answer the patient profile reliably? Yes, cognitively intact   Describe the patient's ability to walk at the present time. Does not walk or unable to take any steps at all   How often would a person be available to care for the patient? Whenever needed     Glen will discharge home with his mother when stable. He was previously followed by [facility] prior to admit and will resume care with them at discharge.

## 2020-08-10 NOTE — SUBJECTIVE & OBJECTIVE
Review of Systems   Unable to perform ROS: Dementia     Objective:     Vital Signs (Most Recent):  Temp: 99.6 °F (37.6 °C) (08/10/20 0752)  Pulse: (!) 116 (08/10/20 0752)  Resp: 20 (08/10/20 0752)  BP: 134/83 (08/10/20 0752)  SpO2: 95 % (08/10/20 0805) Vital Signs (24h Range):  Temp:  [98.4 °F (36.9 °C)-101.9 °F (38.8 °C)] 99.6 °F (37.6 °C)  Pulse:  [] 116  Resp:  [19-30] 20  SpO2:  [95 %-100 %] 95 %  BP: (108-134)/(66-83) 134/83     Weight: 35.4 kg (78 lb 0.7 oz)  Body mass index is 16.89 kg/m².    Physical Exam  Vitals signs and nursing note reviewed.   Constitutional:       Appearance: He is well-developed.   HENT:      Head: Normocephalic and atraumatic.      Right Ear: External ear normal.      Left Ear: External ear normal.      Nose: Nose normal.   Eyes:      General:         Right eye: No discharge.         Left eye: No discharge.      Conjunctiva/sclera: Conjunctivae normal.      Pupils: Pupils are equal, round, and reactive to light.      Comments: Strabismus right eye   Neck:      Musculoskeletal: Neck supple.      Thyroid: No thyromegaly.   Cardiovascular:      Rate and Rhythm: Normal rate and regular rhythm.      Heart sounds: No murmur. No friction rub. No gallop.       Comments: Tachycardia, regular  Pulmonary:      Effort: Pulmonary effort is normal. No respiratory distress.      Breath sounds: Normal breath sounds. No wheezing or rales.      Comments: Upper airway noise  Abdominal:      General: Abdomen is flat. Bowel sounds are normal. There is no distension.      Palpations: Abdomen is soft.      Tenderness: There is no abdominal tenderness.      Comments: PEG in place with mild redness, no drainage    Foul smelling stool   Musculoskeletal:      Right lower leg: No edema.      Left lower leg: No edema.      Comments: Muscle atrophy  contractures   Lymphadenopathy:      Cervical: No cervical adenopathy.   Skin:     General: Skin is warm and dry.   Neurological:      Mental Status: He is  alert.      Comments: Patient alert but otherwise not responsive and can not cooperate in exam, severe CP   Psychiatric:      Comments: Uneasy in bed. Rotating around           CRANIAL NERVES     CN III, IV, VI   Pupils are equal, round, and reactive to light.       Significant Labs: covid screen negative-- repeat pending   CBC:   Recent Labs   Lab 20  0712 08/10/20  0644   WBC 9.42 13.68*   HGB 10.9* 12.2*   HCT 34.7* 39.1*   * 392*     CMP:   Recent Labs   Lab 20  0712 08/10/20  0644   * 145   K 3.4* 3.2*   * 113*   CO2 23 22*   GLU 93 89   BUN 11 11   CREATININE 0.7 0.7   CALCIUM 8.3* 8.5*   PROT 6.7 6.9   ALBUMIN 2.7* 2.6*   BILITOT 0.2 0.2   ALKPHOS 82 74   AST 40 28   ALT 61* 49*   ANIONGAP 10 10   EGFRNONAA >60 >60      phenobarb 25.8 .    CRP 58.1    procal 0.21  Lactic acid 1.3    Urine Studies:   Recent Labs   Lab 20  0555   COLORU Yellow   APPEARANCEUA Hazy*   PHUR 6.0   SPECGRAV >=1.030*   PROTEINUA 1+*   GLUCUA Negative   KETONESU Negative   BILIRUBINUA Negative   OCCULTUA 3+*   NITRITE Negative   UROBILINOGEN Negative   LEUKOCYTESUR Negative   RBCUA 40*   WBCUA 8*   BACTERIA Occasional   HYALINECASTS 2*     c diff negative .      Blood cultures NGTD- in process x 2     Strep and flu negative     Significant Imagin/4 CXR The lungs are clear, with normal appearance of pulmonary vasculature and no pleural effusion or pneumothorax.     The cardiac silhouette is normal in size. The hilar and mediastinal contours are unremarkable.     Bones are intact.     CTA chest     1. No evidence of acute cardiopulmonary embolus.  2. Still a zone of increased density identified in the patient's posterior gutter on the left 12 mm in size may reflect a early infiltrate or suspicious pulmonary nodule formation.  On any matter, short-term interval follow-up within a 6 month interval to document stability imaging findings is advised based on the Alexandra  criteria.  3. 6 mm cyst projecting in the posterior interpolar region of the left kidney.  4. Prominent distention of the esophagus with evidence of retention of fluid that may reflect findings as a manifestation of achalasia with scleroderma as there is of moderate tapering of the distal esophagus at the level of the gastroesophageal junction.       8/5 EKG Sinus tachycardia  Otherwise normal ECG  When compared with ECG of 15-OCT-2019 18:43,  Nonspecific T wave abnormality now evident in Anterior leads

## 2020-08-10 NOTE — ASSESSMENT & PLAN NOTE
Antibiotic associate diarrhea vs true C. Diff  C. Diff pending  If worsening clinical status will start PO Vanc for treatment; holding tonight as true clinical picture not yet clear    8/6:  Check stool studies--pending. C. Diff negative  Change NS 150cc/hr  8/7 Blood Cx negative, afebrile but WBC trending up; will add Cipro + flagyl and cont for 7 days  Stool for Ova/parasites     8/8  Stool studies all negative. Started on flagyl anyway. Will give him probiotics as well.    8/9  Add in rotavirus panel today. Continued on flagyl.    8/10  Maybe this is an occult c diff? Interestingly it improved with addition of IV vanc?

## 2020-08-10 NOTE — PLAN OF CARE
Patient admitted with hypokalemia.  Did have large episode of watery diarrhea, stool sample sent to check for fecal fat.    Contact isolation discontinued; COVID test negative.   No fevers noted today.  VSS on room air.    Glucose checks with meals.  Isosource, 250mL, 4 times a day given as gravity feedings through PEG tube; no residual noted.    Heart monitor shows ST.  Potassium level 3.2; replaced with 40meq this morning and a repeat dose this evening.   WBC's up at 13.68.  patient on oral Flagyl and IV Vancomycin.  Vanco trough tonight at 20:00.  Patient turned Q2 for skin prevention.  Barrier cream used to red scrotal skin.

## 2020-08-10 NOTE — PROGRESS NOTES
"[facility] Medicine  Progress Note    Patient Name: Glen Moscoso  MRN: [id number]  Patient Class: IP- Inpatient   Admission Date: 8/4/2020  Length of Stay: 5 days  Attending Physician: Anjelica Brandt MD  Primary Care Provider: Yadi Lawson MD        Subjective:     Principal Problem:Hypokalemia        HPI:  Patient presented to ER with fever and cough. Patient has cerebral palsy and is non-verbal. Mother provides history. He was seen at [facility] ER 2 weeks ago and diagnosed with bronchitis and strep throat. Treated with amoxil. He completed antibx 4 days ago. Mom reports loose watery stools 3-4x a day for almost 2 weeks now. She noted he had fever yesterday "He was burning up" but did not check with thermometer. She notes he is breathing a little heavier than normal. + cough. She does not think he is in any pain. No changes to tube feeds (Ensure 4x a day via PEG). No sick contacts or recent travel but was in [facility] ER and was hospitalized at [place] before that for "aspiration" and on antibx then. Patient has low grade temp 99.5. HR 120s. RR 30s. Sats 96% on RA. Rapid COVID negative however note mild elevation LFTs, LDH, ferritin pending. D-dimer elevated, CTA pending. Lactic acid and procalcitonin normal. Repeat COVID swab ordered. WBC 15K. UA not infectious. K 2.2--likely due to diarrhea. GFR > 60 (Cr 0.7 but very low muscle mass). CXR clear. Flu and strep negative. Admitted for treatment of hypokalemia and possible C. Diff vs COVID vs PE.     Overview/Hospital Course:  CTA done this am negative for PE. POX 96% RR 18. He is still tachycardiac. With low grade fever. And diarrhea persist. K+ is better after 5-10meq KCL riders and 20meq via g tube.     8/6: COVID negative. Still having diarrhea. Still tachycardia (rate 120s and regular); mom doesn't know if this is his baseline. RR back to normal. He is more alert and interactive with TV. K is normal. Stool studies still pending. C. Diff " negative.     8/7/2020 Blood Cx negative x 3d- but WBC trending up WBC 12.08> 14.45, will add Cipro , repeat CXR   Patient had 3 loose stools last shift/voiding per diaper. Stool was sent to lab for ova and parasites as ordered, c-diff negative    K+ 4.2   Getting IVFs @ 150ml/hr , HR Remains 114 sinus tach    8/8  Fever overnight. More agitated. HR up to 150s. Given propranolol and HR down to 100s. Less agitated. Slept okay. Still with foul smelling stool.    8/9  Switched from cipro to levaquin to cover for potential right lower lobe developing infiltrate. However, last night, he had higher fever. Discussed at length with mom that he has been on >10 rounds of different antibiotics over the last 2 months. Not always with a definitive diagnosis. Currently, he is still having foul smelling abnormally colored stools. He is non verbal and does not grimace with movement of his arms or legs or head. He also is unphased by abdominal exam.    8/10/2020 Tmax 101.9 , FUO, ? Infectious diarrhea tx ; cipro changed to Levofloxacin day 3 to cover for ? RLL pneumonia - but cxr not convincing,   Still getting flagyl. Switched to vanc and since, no fever, no diarrhea. Long discussion regarding ~11 abx in the past 2 months, has never been on Vancomycin  Did total body exam to rule out possible skin soft tissue etiology;   ? Echo pending;   BP stable, -116 , afebrile this am so some improvement with Vanc-   Resp infection panel  and flu swab negative, Blood Cx - NGTD x 2 d , repeat Covid negative, C- diff negative           Review of Systems   Unable to perform ROS: Dementia     Objective:     Vital Signs (Most Recent):  Temp: 99.6 °F (37.6 °C) (08/10/20 0752)  Pulse: (!) 116 (08/10/20 0752)  Resp: 20 (08/10/20 0752)  BP: 134/83 (08/10/20 0752)  SpO2: 95 % (08/10/20 0805) Vital Signs (24h Range):  Temp:  [98.4 °F (36.9 °C)-101.9 °F (38.8 °C)] 99.6 °F (37.6 °C)  Pulse:  [] 116  Resp:  [19-30] 20  SpO2:  [95 %-100 %] 95  %  BP: (108-134)/(66-83) 134/83     Weight: 35.4 kg (78 lb 0.7 oz)  Body mass index is 16.89 kg/m².    Physical Exam  Vitals signs and nursing note reviewed.   Constitutional:       Appearance: He is well-developed.   HENT:      Head: Normocephalic and atraumatic.      Right Ear: External ear normal.      Left Ear: External ear normal.      Nose: Nose normal.   Eyes:      General:         Right eye: No discharge.         Left eye: No discharge.      Conjunctiva/sclera: Conjunctivae normal.      Pupils: Pupils are equal, round, and reactive to light.      Comments: Strabismus right eye   Neck:      Musculoskeletal: Neck supple.      Thyroid: No thyromegaly.   Cardiovascular:      Rate and Rhythm: Normal rate and regular rhythm.      Heart sounds: No murmur. No friction rub. No gallop.       Comments: Tachycardia, regular  Pulmonary:      Effort: Pulmonary effort is normal. No respiratory distress.      Breath sounds: Normal breath sounds. No wheezing or rales.      Comments: Upper airway noise  Abdominal:      General: Abdomen is flat. Bowel sounds are normal. There is no distension.      Palpations: Abdomen is soft.      Tenderness: There is no abdominal tenderness.      Comments: PEG in place with mild redness, no drainage    Foul smelling stool   Musculoskeletal:      Right lower leg: No edema.      Left lower leg: No edema.      Comments: Muscle atrophy  contractures   Lymphadenopathy:      Cervical: No cervical adenopathy.   Skin:     General: Skin is warm and dry.   Neurological:      Mental Status: He is alert.      Comments: Patient alert but otherwise not responsive and can not cooperate in exam, severe CP   Psychiatric:      Comments: Uneasy in bed. Rotating around           CRANIAL NERVES     CN III, IV, VI   Pupils are equal, round, and reactive to light.       Significant Labs: covid screen negative-- repeat pending   CBC:   Recent Labs   Lab 08/09/20  0712 08/10/20  0644   WBC 9.42 13.68*   HGB 10.9*  12.2*   HCT 34.7* 39.1*   * 392*     CMP:   Recent Labs   Lab 20  0712 08/10/20  0644   * 145   K 3.4* 3.2*   * 113*   CO2 23 22*   GLU 93 89   BUN 11 11   CREATININE 0.7 0.7   CALCIUM 8.3* 8.5*   PROT 6.7 6.9   ALBUMIN 2.7* 2.6*   BILITOT 0.2 0.2   ALKPHOS 82 74   AST 40 28   ALT 61* 49*   ANIONGAP 10 10   EGFRNONAA >60 >60      phenobarb 25.8 .    CRP 58.1    procal 0.21  Lactic acid 1.3    Urine Studies:   Recent Labs   Lab 20  0555   COLORU Yellow   APPEARANCEUA Hazy*   PHUR 6.0   SPECGRAV >=1.030*   PROTEINUA 1+*   GLUCUA Negative   KETONESU Negative   BILIRUBINUA Negative   OCCULTUA 3+*   NITRITE Negative   UROBILINOGEN Negative   LEUKOCYTESUR Negative   RBCUA 40*   WBCUA 8*   BACTERIA Occasional   HYALINECASTS 2*     c diff negative .      Blood cultures NGTD- in process x 2     Strep and flu negative     Significant Imagin/4 CXR The lungs are clear, with normal appearance of pulmonary vasculature and no pleural effusion or pneumothorax.     The cardiac silhouette is normal in size. The hilar and mediastinal contours are unremarkable.     Bones are intact.     CTA chest     1. No evidence of acute cardiopulmonary embolus.  2. Still a zone of increased density identified in the patient's posterior gutter on the left 12 mm in size may reflect a early infiltrate or suspicious pulmonary nodule formation.  On any matter, short-term interval follow-up within a 6 month interval to document stability imaging findings is advised based on the Fleischner criteria.  3. 6 mm cyst projecting in the posterior interpolar region of the left kidney.  4. Prominent distention of the esophagus with evidence of retention of fluid that may reflect findings as a manifestation of achalasia with scleroderma as there is of moderate tapering of the distal esophagus at the level of the gastroesophageal junction.        EKG Sinus tachycardia  Otherwise normal ECG  When compared with  ECG of 15-OCT-2019 18:43,  Nonspecific T wave abnormality now evident in Anterior leads      Assessment/Plan:      * Hypokalemia  Replace IV and via PEG today  Labs in AM  Likely due to severe diarrhea  telemetry  K+ better 2.2>3.3 after 5 >> 10meq IV riders in ER and 20meq po   Will repeat g tube this am 40meq po once this am    8/6: resolved. Stop K in fluids today. Still having diarrhea so will keep 1 more day and repeat labs in morning to be sure remains stable as this was his main reason for admission  8/7 K+ 4.2     Resolved    8/9  Replace via G tube. Worsening with diarrhea  8/10 change KCL to powder for PEG tube     Fever in adult  Not sure the cause of this.  In the last 2 months he has been innudated with antibiotics.  Cultures seem to always be negative.  Will get echo tomorrow.  Abx increased to cover mrsa. Addition of levaquin did not help with fevers, but wbc ct did improve. Will cont both for now.  Broaden work up.   If no obvious etiology will d/w id.  8/10 A little better with Vanc - no BM over night, afebrile this am   Echo pending,         Aspiration pneumonitis  Recently admitted to [place] with this diagnosis  Started on Vanc yesterday ? RLL pneumonia   Not really convinced of this. First xr with questionable right sided changes. Repeat cxr clear.    Leukocytosis  Differential broad  Low grade fevers  C. Diff possible  CXR clear  UA clear  Aspiration in differential    Ruled out PE with CTA  Hold antibx for now as lactic acid and procal is normal but low threshold to start if any decompensation overnight     8/6: resolved without antibx treatment. Could be dehydration.     8/7  Slight bump again today. Start cipro flagyl and check cxr again.    8/9  Wbc ct improved today. But fevers still very high. Start on vanc (d/c levaquin) with recent hospital admission. Monitor fever curve.  8/10 WBC 9.42> 13.68, Resp infection panel  and flu swab negative, Blood Cx - NGTD x 2 d   vanc started -  afebrile this am,       Diarrhea of presumed infectious origin  Antibiotic associate diarrhea vs true C. Diff  C. Diff pending  If worsening clinical status will start PO Vanc for treatment; holding tonight as true clinical picture not yet clear    8/6:  Check stool studies--pending. C. Diff negative  Change NS 150cc/hr  8/7 Blood Cx negative, afebrile but WBC trending up; will add Cipro + flagyl and cont for 7 days  Stool for Ova/parasites     8/8  Stool studies all negative. Started on flagyl anyway. Will give him probiotics as well.    8/9  Add in rotavirus panel today. Continued on flagyl.    8/10  Maybe this is an occult c diff? Interestingly it improved with addition of IV vanc?    Tachypnea  Repeat COVID ordered; rapid negative but + cough, diarrhea, mild LFT elevation  CXR without infiltrates  D-dimer elevated so checking CTA for PE tonight. Will start heparin gtt if CTA positive  No antibx for now with normal procal but aspiration in differential as well so could add Zosyn if needed  8/5: POX 96% on RA and RR down to 18/min. Maybe dehydration/hypoK played a role here    8/6: resolved. COVID negative. RR 18 from 30-40 on admit.   8/7 remains with - check EKG verify sinus tach     8/8  CXR looks like he has a developing infiltrate. Levaquin started today. Has fever, increased wbc ct and now infiltrate. Reorder blood culture.    8/9  Consulted pulm. Will get echo tomorrow and consult cards.     Sinus tachycardia  NS 10cc/hr  Likely some dehydration from frequent diarrhea (very low muscle mass so I trust Cr less)  D-dimer elevated so assessing for PE as well  CTA negative PE    Mom unsure his baseline heart rate.     Started on propranolol yesterday    8/9  With continued tachycardia and fever. Given single 500 cc bolus last night. Remains tachycardic. Worse with fevers    8/10  Improved with propranolol and vanc.    Cerebral palsy  Cont home phenobarbital and baclofen  Cont tube feeds per home routine  via PEG  He is non-verbal and not mobile at baseline. He seems to be more interactive and grunting at TV today. Mom thinks this is his baseline        VTE Risk Mitigation (From admission, onward)         Ordered     IP VTE LOW RISK PATIENT  Once      08/04/20 5765                      Alexa Sotomayor MD  Department of Hospital Medicine   [facility]

## 2020-08-11 LAB
ALBUMIN SERPL BCP-MCNC: 2.7 G/DL (ref 3.5–5.2)
ALP SERPL-CCNC: 72 U/L (ref 55–135)
ALT SERPL W/O P-5'-P-CCNC: 41 U/L (ref 10–44)
ANION GAP SERPL CALC-SCNC: 9 MMOL/L (ref 8–16)
AST SERPL-CCNC: 21 U/L (ref 10–40)
BASOPHILS # BLD AUTO: ABNORMAL K/UL (ref 0–0.2)
BASOPHILS NFR BLD: 0 % (ref 0–1.9)
BILIRUB SERPL-MCNC: 0.2 MG/DL (ref 0.1–1)
BUN SERPL-MCNC: 11 MG/DL (ref 6–20)
CALCIUM SERPL-MCNC: 8.4 MG/DL (ref 8.7–10.5)
CHLORIDE SERPL-SCNC: 117 MMOL/L (ref 95–110)
CO2 SERPL-SCNC: 17 MMOL/L (ref 23–29)
CREAT SERPL-MCNC: 0.7 MG/DL (ref 0.5–1.4)
DIFFERENTIAL METHOD: ABNORMAL
EOSINOPHIL # BLD AUTO: ABNORMAL K/UL (ref 0–0.5)
EOSINOPHIL NFR BLD: 6 % (ref 0–8)
ERYTHROCYTE [DISTWIDTH] IN BLOOD BY AUTOMATED COUNT: 14.8 % (ref 11.5–14.5)
EST. GFR  (AFRICAN AMERICAN): >60 ML/MIN/1.73 M^2
EST. GFR  (NON AFRICAN AMERICAN): >60 ML/MIN/1.73 M^2
FAT STL SUDAN IV STN: NORMAL
GLUCOSE SERPL-MCNC: 90 MG/DL (ref 70–110)
HCT VFR BLD AUTO: 36.8 % (ref 40–54)
HGB BLD-MCNC: 11.4 G/DL (ref 14–18)
IMM GRANULOCYTES # BLD AUTO: ABNORMAL K/UL (ref 0–0.04)
IMM GRANULOCYTES NFR BLD AUTO: ABNORMAL % (ref 0–0.5)
LYMPHOCYTES # BLD AUTO: ABNORMAL K/UL (ref 1–4.8)
LYMPHOCYTES NFR BLD: 13 % (ref 18–48)
MCH RBC QN AUTO: 28.5 PG (ref 27–31)
MCHC RBC AUTO-ENTMCNC: 31 G/DL (ref 32–36)
MCV RBC AUTO: 92 FL (ref 82–98)
MONOCYTES # BLD AUTO: ABNORMAL K/UL (ref 0.3–1)
MONOCYTES NFR BLD: 7 % (ref 4–15)
NEUTROPHILS NFR BLD: 54 % (ref 38–73)
NEUTS BAND NFR BLD MANUAL: 20 %
NRBC BLD-RTO: 0 /100 WBC
PLATELET # BLD AUTO: 415 K/UL (ref 150–350)
PLATELET BLD QL SMEAR: ABNORMAL
PMV BLD AUTO: 9.7 FL (ref 9.2–12.9)
POCT GLUCOSE: 123 MG/DL (ref 70–110)
POCT GLUCOSE: 80 MG/DL (ref 70–110)
POCT GLUCOSE: 80 MG/DL (ref 70–110)
POCT GLUCOSE: 98 MG/DL (ref 70–110)
POTASSIUM SERPL-SCNC: 3.4 MMOL/L (ref 3.5–5.1)
PROT SERPL-MCNC: 6.7 G/DL (ref 6–8.4)
RBC # BLD AUTO: 4 M/UL (ref 4.6–6.2)
SODIUM SERPL-SCNC: 143 MMOL/L (ref 136–145)
WBC # BLD AUTO: 12.59 K/UL (ref 3.9–12.7)

## 2020-08-11 PROCEDURE — 36415 COLL VENOUS BLD VENIPUNCTURE: CPT

## 2020-08-11 PROCEDURE — 25000003 PHARM REV CODE 250: Performed by: FAMILY MEDICINE

## 2020-08-11 PROCEDURE — 99233 PR SUBSEQUENT HOSPITAL CARE,LEVL III: ICD-10-PCS | Mod: ,,, | Performed by: FAMILY MEDICINE

## 2020-08-11 PROCEDURE — 80053 COMPREHEN METABOLIC PANEL: CPT

## 2020-08-11 PROCEDURE — 85027 COMPLETE CBC AUTOMATED: CPT

## 2020-08-11 PROCEDURE — 25000003 PHARM REV CODE 250: Performed by: INTERNAL MEDICINE

## 2020-08-11 PROCEDURE — 11000001 HC ACUTE MED/SURG PRIVATE ROOM

## 2020-08-11 PROCEDURE — 63600175 PHARM REV CODE 636 W HCPCS: Performed by: INTERNAL MEDICINE

## 2020-08-11 PROCEDURE — 25500020 PHARM REV CODE 255: Performed by: INTERNAL MEDICINE

## 2020-08-11 PROCEDURE — 99233 SBSQ HOSP IP/OBS HIGH 50: CPT | Mod: ,,, | Performed by: FAMILY MEDICINE

## 2020-08-11 PROCEDURE — 85007 BL SMEAR W/DIFF WBC COUNT: CPT

## 2020-08-11 PROCEDURE — 94761 N-INVAS EAR/PLS OXIMETRY MLT: CPT

## 2020-08-11 PROCEDURE — 25000003 PHARM REV CODE 250: Performed by: NURSE PRACTITIONER

## 2020-08-11 RX ORDER — DEXTROSE MONOHYDRATE, SODIUM CHLORIDE, AND POTASSIUM CHLORIDE 50; 1.49; 4.5 G/1000ML; G/1000ML; G/1000ML
INJECTION, SOLUTION INTRAVENOUS CONTINUOUS
Status: DISCONTINUED | OUTPATIENT
Start: 2020-08-11 | End: 2020-08-12 | Stop reason: SDUPTHER

## 2020-08-11 RX ORDER — POTASSIUM CHLORIDE 1.5 G/1.58G
20 POWDER, FOR SOLUTION ORAL ONCE
Status: COMPLETED | OUTPATIENT
Start: 2020-08-11 | End: 2020-08-11

## 2020-08-11 RX ORDER — POTASSIUM CHLORIDE 1.5 G/1.58G
40 POWDER, FOR SOLUTION ORAL ONCE
Status: DISCONTINUED | OUTPATIENT
Start: 2020-08-11 | End: 2020-08-11

## 2020-08-11 RX ORDER — VANCOMYCIN HYDROCHLORIDE 25 MG/ML
125 KIT ORAL EVERY 6 HOURS
Status: DISCONTINUED | OUTPATIENT
Start: 2020-08-11 | End: 2020-08-18 | Stop reason: HOSPADM

## 2020-08-11 RX ORDER — POTASSIUM CHLORIDE 1.5 G/1.58G
40 POWDER, FOR SOLUTION ORAL ONCE
Status: COMPLETED | OUTPATIENT
Start: 2020-08-11 | End: 2020-08-11

## 2020-08-11 RX ADMIN — VANCOMYCIN HYDROCHLORIDE 125 MG: KIT at 06:08

## 2020-08-11 RX ADMIN — BACLOFEN 10 MG: 10 TABLET ORAL at 08:08

## 2020-08-11 RX ADMIN — PROPRANOLOL HYDROCHLORIDE 20 MG: 20 TABLET ORAL at 03:08

## 2020-08-11 RX ADMIN — IOHEXOL 50 ML: 350 INJECTION, SOLUTION INTRAVENOUS at 01:08

## 2020-08-11 RX ADMIN — LEVOFLOXACIN 500 MG: 500 TABLET, FILM COATED ORAL at 08:08

## 2020-08-11 RX ADMIN — POTASSIUM CHLORIDE 20 MEQ: 1.5 POWDER, FOR SOLUTION ORAL at 03:08

## 2020-08-11 RX ADMIN — VANCOMYCIN HYDROCHLORIDE 750 MG: 750 INJECTION, POWDER, LYOPHILIZED, FOR SOLUTION INTRAVENOUS at 08:08

## 2020-08-11 RX ADMIN — PHENOBARBITAL 100 MG: 20 ELIXIR ORAL at 09:08

## 2020-08-11 RX ADMIN — POTASSIUM CHLORIDE 40 MEQ: 1.5 POWDER, FOR SOLUTION ORAL at 10:08

## 2020-08-11 RX ADMIN — METRONIDAZOLE 500 MG: 500 TABLET ORAL at 03:08

## 2020-08-11 RX ADMIN — METRONIDAZOLE 500 MG: 500 TABLET ORAL at 09:08

## 2020-08-11 RX ADMIN — IBUPROFEN 300 MG: 100 SUSPENSION ORAL at 08:08

## 2020-08-11 RX ADMIN — IBUPROFEN 300 MG: 100 SUSPENSION ORAL at 01:08

## 2020-08-11 RX ADMIN — IOHEXOL 75 ML: 350 INJECTION, SOLUTION INTRAVENOUS at 01:08

## 2020-08-11 RX ADMIN — DEXTROSE, SODIUM CHLORIDE, AND POTASSIUM CHLORIDE: 5; .45; .15 INJECTION INTRAVENOUS at 06:08

## 2020-08-11 RX ADMIN — GABAPENTIN 250 MG: 250 SOLUTION ORAL at 09:08

## 2020-08-11 RX ADMIN — METRONIDAZOLE 500 MG: 500 TABLET ORAL at 05:08

## 2020-08-11 RX ADMIN — PROPRANOLOL HYDROCHLORIDE 20 MG: 20 TABLET ORAL at 08:08

## 2020-08-11 RX ADMIN — BACLOFEN 10 MG: 10 TABLET ORAL at 09:08

## 2020-08-11 RX ADMIN — BACLOFEN 10 MG: 10 TABLET ORAL at 03:08

## 2020-08-11 RX ADMIN — PROPRANOLOL HYDROCHLORIDE 20 MG: 20 TABLET ORAL at 09:08

## 2020-08-11 NOTE — SUBJECTIVE & OBJECTIVE
Review of Systems   Unable to perform ROS: Patient nonverbal     Objective:     Vital Signs (Most Recent):  Temp: 99.7 °F (37.6 °C) (08/11/20 0721)  Pulse: (!) 115 (08/11/20 0721)  Resp: 20 (08/11/20 0721)  BP: 121/77 (08/11/20 0721)  SpO2: 97 % (08/11/20 0746) Vital Signs (24h Range):  Temp:  [98.5 °F (36.9 °C)-103 °F (39.4 °C)] 99.7 °F (37.6 °C)  Pulse:  [100-135] 115  Resp:  [18-22] 20  SpO2:  [94 %-97 %] 97 %  BP: (102-134)/(57-83) 121/77     Weight: 35.4 kg (78 lb)  Body mass index is 16.88 kg/m².    Physical Exam  Vitals signs and nursing note reviewed.   Constitutional:       Appearance: He is well-developed.   HENT:      Head: Normocephalic and atraumatic.      Right Ear: External ear normal.      Left Ear: External ear normal.      Nose: Nose normal.   Eyes:      General:         Right eye: No discharge.         Left eye: No discharge.      Conjunctiva/sclera: Conjunctivae normal.      Pupils: Pupils are equal, round, and reactive to light.      Comments: Strabismus right eye   Neck:      Musculoskeletal: Neck supple.      Thyroid: No thyromegaly.   Cardiovascular:      Rate and Rhythm: Normal rate and regular rhythm.      Heart sounds: No murmur. No friction rub. No gallop.       Comments: Tachycardia, regular  Pulmonary:      Effort: Pulmonary effort is normal. No respiratory distress.      Breath sounds: Normal breath sounds. No wheezing or rales.      Comments: Upper airway noise  Abdominal:      General: Abdomen is flat. Bowel sounds are normal. There is no distension.      Palpations: Abdomen is soft.      Tenderness: There is no abdominal tenderness.      Comments: PEG in place with mild redness, no drainage    Foul smelling stool   Musculoskeletal:      Right lower leg: No edema.      Left lower leg: No edema.      Comments: Muscle atrophy  contractures   Lymphadenopathy:      Cervical: No cervical adenopathy.   Skin:     General: Skin is warm and dry.   Neurological:      Mental Status: He is  alert.      Comments: Patient alert but otherwise not responsive and can not cooperate in exam, severe CP   Psychiatric:      Comments: Uneasy in bed. Rotating around           CRANIAL NERVES     CN III, IV, VI   Pupils are equal, round, and reactive to light.               Significant Labs: covid screen negative-- repeat pending   CBC:   Recent Labs   Lab 08/10/20  0644 20  0634   WBC 13.68* 12.59   HGB 12.2* 11.4*   HCT 39.1* 36.8*   * 415*     CMP:   Recent Labs   Lab 08/10/20  0644 20  0634    143   K 3.2* 3.4*   * 117*   CO2 22* 17*   GLU 89 90   BUN 11 11   CREATININE 0.7 0.7   CALCIUM 8.5* 8.4*   PROT 6.9 6.7   ALBUMIN 2.6* 2.7*   BILITOT 0.2 0.2   ALKPHOS 74 72   AST 28 21   ALT 49* 41   ANIONGAP 10 9   EGFRNONAA >60 >60      phenobarb 25.8 .    CRP 58.1    procal 0.21  Lactic acid 1.3    Urine Studies:   No results for input(s): COLORU, APPEARANCEUA, PHUR, SPECGRAV, PROTEINUA, GLUCUA, KETONESU, BILIRUBINUA, OCCULTUA, NITRITE, UROBILINOGEN, LEUKOCYTESUR, RBCUA, WBCUA, BACTERIA, SQUAMEPITHEL, HYALINECASTS in the last 48 hours.    Invalid input(s): WRIGHTSUR  c diff negative .      Blood cultures NGTD- in process x 2     Strep and flu negative     Significant Imagin/10/2020 [facility]  · Normal left ventricular systolic function. The estimated ejection fraction is 60%.  · No wall motion abnormalities.  · Normal LV diastolic function.  · Normal right ventricular systolic function.     CXR The lungs are clear, with normal appearance of pulmonary vasculature and no pleural effusion or pneumothorax.     The cardiac silhouette is normal in size. The hilar and mediastinal contours are unremarkable.     Bones are intact.     CTA chest     1. No evidence of acute cardiopulmonary embolus.  2. Still a zone of increased density identified in the patient's posterior gutter on the left 12 mm in size may reflect a early infiltrate or suspicious pulmonary nodule formation.  On  any matter, short-term interval follow-up within a 6 month interval to document stability imaging findings is advised based on the Fleischner criteria.  3. 6 mm cyst projecting in the posterior interpolar region of the left kidney.  4. Prominent distention of the esophagus with evidence of retention of fluid that may reflect findings as a manifestation of achalasia with scleroderma as there is of moderate tapering of the distal esophagus at the level of the gastroesophageal junction.       8/5 EKG Sinus tachycardia  Otherwise normal ECG  When compared with ECG of 15-OCT-2019 18:43,  Nonspecific T wave abnormality now evident in Anterior leads

## 2020-08-11 NOTE — ASSESSMENT & PLAN NOTE
Recently admitted to [facility] with this diagnosis  Started on Vanc yesterday ? RLL pneumonia   Not really convinced of this. First xr with questionable right sided changes. Repeat cxr clear.  8/11 Day 4 levofloxacin, day 3 Vanc- continues with fever and diarrhea

## 2020-08-11 NOTE — PROGRESS NOTES
Pharmacokinetic Assessment Follow Up: IV Vancomycin    Vancomycin serum concentration assessment(s):    The trough level was drawn correctly and can be used to guide therapy at this time. The measurement is below the desired definitive target range of 10 to 20 mcg/mL.    Vancomycin Regimen Plan:    Change regimen to Vancomycin 750 mg IV every 12 hours with next serum trough concentration measured at 08:00 prior to 4th dose on 8-12    Drug levels (last 3 results):  Recent Labs   Lab Result Units 08/10/20  2009   Vancomycin-Trough ug/mL 6.4*       Pharmacy will continue to follow and monitor vancomycin.    Please contact pharmacy at extension [phone] for questions regarding this assessment.    Thank you for the consult,   Julian Franz       Patient brief summary:  Glen Moscoso is a 24 y.o. male initiated on antimicrobial therapy with IV Vancomycin for treatment of lower respiratory infection    The patient's current regimen is 500 q12    Drug Allergies:   Review of patient's allergies indicates:  No Known Allergies    Actual Body Weight:   35.4 kg    Renal Function:   Estimated Creatinine Clearance: 82.9 mL/min (based on SCr of 0.7 mg/dL).,     Dialysis Method (if applicable):  N/A    CBC (last 72 hours):  Recent Labs   Lab Result Units 08/08/20  0655 08/09/20  0712 08/10/20  0644   WBC K/uL 15.04* 9.42 13.68*   Hemoglobin g/dL 11.2* 10.9* 12.2*   Hematocrit % 36.4* 34.7* 39.1*   Platelets K/uL 257 397* 392*   Gran% % 73.7* 62.7 47.0   Lymph% % 9.4* 18.9 23.0   Mono% % 7.0 13.6 7.0   Eosinophil% % 9.0* 4.2 8.0   Basophil% % 0.3 0.2 0.0   Differential Method  Automated Automated Manual       Metabolic Panel (last 72 hours):  Recent Labs   Lab Result Units 08/08/20  0655 08/09/20  0555 08/09/20  0712 08/10/20  0644   Sodium mmol/L 143  --  146* 145   Potassium mmol/L 3.8  --  3.4* 3.2*   Chloride mmol/L 107  --  113* 113*   CO2 mmol/L 24  --  23 22*   Glucose mg/dL 90  --  93 89   Glucose, UA   --  Negative  --   --     BUN, Bld mg/dL 8  --  11 11   Creatinine mg/dL 0.6  --  0.7 0.7   Albumin g/dL 2.8*  --  2.7* 2.6*   Total Bilirubin mg/dL 0.3  --  0.2 0.2   Alkaline Phosphatase U/L 96  --  82 74   AST U/L 65*  --  40 28   ALT U/L 69*  --  61* 49*       Vancomycin Administrations:  vancomycin given in the last 96 hours                     vancomycin 500 mg in dextrose 5 % 100 mL IVPB (ready to mix system) (mg) 500 mg New Bag 08/10/20 0940     500 mg New Bag 08/09/20 2147     500 mg New Bag  0955                    Microbiologic Results:  Microbiology Results (last 7 days)       Procedure Component Value Units Date/Time    Stool culture [[id number]] Collected: 08/05/20 1441    Order Status: Completed Specimen: Stool Updated: 08/10/20 1516     Stool Culture No Salmonella,Shigella,Vibrio,Campylobacter,Yersinia isolated.    Blood culture [[id number]] Collected: 08/08/20 1112    Order Status: Completed Specimen: Blood Updated: 08/10/20 0613     Blood Culture, Routine No Growth to date      No Growth to date    Blood culture [[id number]] Collected: 08/08/20 1112    Order Status: Completed Specimen: Blood Updated: 08/10/20 0613     Blood Culture, Routine No Growth to date      No Growth to date    Blood Culture #1 **CANNOT BE ORDERED STAT** [[id number]] Collected: 08/04/20 1223    Order Status: Completed Specimen: Blood from Antecubital, Right Arm Updated: 08/09/20 2012     Blood Culture, Routine No growth after 5 days.    Blood Culture #1 **CANNOT BE ORDERED STAT** [[id number]] Collected: 08/04/20 1228    Order Status: Completed Specimen: Blood from Antecubital, Left Arm Updated: 08/09/20 2012     Blood Culture, Routine No growth after 5 days.    Respiratory Infection Panel (PCR), Nasopharyngeal [[id number]] Collected: 08/09/20 0619    Order Status: Completed Specimen: Nasopharyngeal Swab Updated: 08/09/20 1732     Respiratory Infection Panel Source NP Swab     Adenovirus Not Detected     Coronavirus 229E, Common Cold Virus Not Detected      Coronavirus HKU1, Common Cold Virus Not Detected     Coronavirus NL63, Common Cold Virus Not Detected     Coronavirus OC43, Common Cold Virus Not Detected     Comment: The Coronavirus strains detected in this test cause the common cold.  These strains are not the COVID-19 (novel Coronavirus)strain   associated with the respiratory disease outbreak.          Human Metapneumovirus Not Detected     Human Rhinovirus/Enterovirus Not Detected     Influenza A (subtypes H1, H1-2009,H3) Not Detected     Influenza B Not Detected     Parainfluenza Virus 1 Not Detected     Parainfluenza Virus 2 Not Detected     Parainfluenza Virus 3 Not Detected     Parainfluenza Virus 4 Not Detected     Respiratory Syncytial Virus Not Detected     Bordetella Parapertussis (DX1968) Not Detected     Bordetella pertussis (ptxP) Not Detected     Chlamydia pneumoniae Not Detected     Mycoplasma pneumoniae Not Detected     Comment: Respiratory Infection Panel testing performed by Multiplex PCR.       Narrative:      For all other respiratory sources, order [id number] -  Respiratory Viral Panel by PCR    Influenza A & B by Molecular [[id number]] Collected: 08/09/20 0618    Order Status: Completed Specimen: Nasopharyngeal Swab Updated: 08/09/20 0904     Influenza A, Molecular Negative     Influenza B, Molecular Negative     Flu A & B Source Nasal swab    E. coli 0157 antigen [[id number]] Collected: 08/05/20 1441    Order Status: Completed Specimen: Stool Updated: 08/07/20 1505     Shiga Toxin 1 E.coli Negative     Shiga Toxin 2 E.coli Negative    Clostridium difficile EIA [[id number]] Collected: 08/04/20 1810    Order Status: Completed Specimen: Stool Updated: 08/05/20 0413     C. diff Antigen Negative     C difficile Toxins A+B, EIA Negative     Comment: Testing not recommended for children <24 months old.       Influenza A & B by Molecular [[id number]] Collected: 08/04/20 1213    Order Status: Completed Specimen: Nasopharyngeal Swab Updated: 08/04/20 1253      Influenza A, Molecular Negative     Influenza B, Molecular Negative     Flu A & B Source Nasal swab    Group A Strep, Molecular [[id number]] Collected: 08/04/20 1213    Order Status: Completed Specimen: Throat Updated: 08/04/20 1251     Group A Strep, Molecular Negative

## 2020-08-11 NOTE — ASSESSMENT & PLAN NOTE
NS 10cc/hr  Likely some dehydration from frequent diarrhea (very low muscle mass so I trust Cr less)  D-dimer elevated so assessing for PE as well  CTA negative PE    Mom unsure his baseline heart rate.     Started on propranolol yesterday    8/9  With continued tachycardia and fever. Given single 500 cc bolus last night. Remains tachycardic. Worse with fevers    8/10  Improved with propranolol and vanc.  8/11 Reviewed old progress notes ; BP documented but not HR- not sure of pts baseline

## 2020-08-11 NOTE — ASSESSMENT & PLAN NOTE
Not sure the cause of this.  In the last 2 months he has been innudated with antibiotics.  Cultures seem to always be negative.  Will get echo tomorrow.  Abx increased to cover mrsa. Addition of levaquin did not help with fevers, but wbc ct did improve. Will cont both for now.  Broaden work up.   If no obvious etiology will d/w id.  8/10 A little better with Vanc - no BM over night, afebrile this am   Echo pending,   8/11 Echo stable, now with recurrent diarrhea and fever, CT of abd and pelvis- if negative for source will need transfer for ID eval, GI eval

## 2020-08-11 NOTE — PROGRESS NOTES
"[facility] Medicine  Progress Note    Patient Name: Glen Moscoso  MRN: [id number]  Patient Class: IP- Inpatient   Admission Date: 8/4/2020  Length of Stay: 6 days  Attending Physician: Anjelica Brandt MD  Primary Care Provider: Yadi Lawson MD        Subjective:     Principal Problem:Hypokalemia        HPI:  Patient presented to ER with fever and cough. Patient has cerebral palsy and is non-verbal. Mother provides history. He was seen at [facility] ER 2 weeks ago and diagnosed with bronchitis and strep throat. Treated with amoxil. He completed antibx 4 days ago. Mom reports loose watery stools 3-4x a day for almost 2 weeks now. She noted he had fever yesterday "He was burning up" but did not check with thermometer. She notes he is breathing a little heavier than normal. + cough. She does not think he is in any pain. No changes to tube feeds (Ensure 4x a day via PEG). No sick contacts or recent travel but was in [facility] ER and was hospitalized at [place] [facility] before that for "aspiration" and on antibx then. Patient has low grade temp 99.5. HR 120s. RR 30s. Sats 96% on RA. Rapid COVID negative however note mild elevation LFTs, LDH, ferritin pending. D-dimer elevated, CTA pending. Lactic acid and procalcitonin normal. Repeat COVID swab ordered. WBC 15K. UA not infectious. K 2.2--likely due to diarrhea. GFR > 60 (Cr 0.7 but very low muscle mass). CXR clear. Flu and strep negative. Admitted for treatment of hypokalemia and possible C. Diff vs COVID vs PE.     Overview/Hospital Course:  CTA done this am negative for PE. POX 96% RR 18. He is still tachycardiac. With low grade fever. And diarrhea persist. K+ is better after 5-10meq KCL riders and 20meq via g tube.     8/6: COVID negative. Still having diarrhea. Still tachycardia (rate 120s and regular); mom doesn't know if this is his baseline. RR back to normal. He is more alert and interactive with TV. K is normal. Stool studies still pending. C. Diff " negative.     8/7/2020 Blood Cx negative x 3d- but WBC trending up WBC 12.08> 14.45, will add Cipro , repeat CXR   Patient had 3 loose stools last shift/voiding per diaper. Stool was sent to lab for ova and parasites as ordered, c-diff negative    K+ 4.2   Getting IVFs @ 150ml/hr , HR Remains 114 sinus tach    8/8  Fever overnight. More agitated. HR up to 150s. Given propranolol and HR down to 100s. Less agitated. Slept okay. Still with foul smelling stool.    8/9  Switched from cipro to levaquin to cover for potential right lower lobe developing infiltrate. However, last night, he had higher fever. Discussed at length with mom that he has been on >10 rounds of different antibiotics over the last 2 months. Not always with a definitive diagnosis. Currently, he is still having foul smelling abnormally colored stools. He is non verbal and does not grimace with movement of his arms or legs or head. He also is unphased by abdominal exam.    8/10/2020 Tmax 101.9 , FUO, ? Infectious diarrhea tx ; cipro changed to Levofloxacin day 3 to cover for ? RLL pneumonia - but cxr not convincing,   Still getting flagyl. Switched to vanc and since, no fever, no diarrhea. Long discussion regarding ~11 abx in the past 2 months, has never been on Vancomycin  Did total body exam to rule out possible skin soft tissue etiology;   ? Echo pending;   BP stable, -116 , afebrile this am so some improvement with Vanc-   Resp infection panel  and flu swab negative, Blood Cx - NGTD x 2 d , repeat Covid negative, C- diff negative     8/11/2020 Tmax 103, 100.7 this am , multiple watery stools overnight, started yesterday afternoon and now has stage 2 buttock wound  Echo with normal EF60%, no mention of valvular abn but  Overall the study quality was poor. The study was difficult due to patient's uncooperativeness, clinical status and poor endocardial visualization.   He gets ensure plus feedings  at home and getting Isosource here; could be  source of diarrhea ? But has fever also   Getting Vanc and flagyl ; day 3 vanc, day 4 levofloxacin/flagyl   Multiple stool negative for rotavirus, O&P negative   Abdomen is non tender but - Will CT abd and pelvis; if negative then will need transfer for ID/GI   This was discussed with mother       Review of Systems   Unable to perform ROS: Patient nonverbal     Objective:     Vital Signs (Most Recent):  Temp: 99.7 °F (37.6 °C) (08/11/20 0721)  Pulse: (!) 115 (08/11/20 0721)  Resp: 20 (08/11/20 0721)  BP: 121/77 (08/11/20 0721)  SpO2: 97 % (08/11/20 0746) Vital Signs (24h Range):  Temp:  [98.5 °F (36.9 °C)-103 °F (39.4 °C)] 99.7 °F (37.6 °C)  Pulse:  [100-135] 115  Resp:  [18-22] 20  SpO2:  [94 %-97 %] 97 %  BP: (102-134)/(57-83) 121/77     Weight: 35.4 kg (78 lb)  Body mass index is 16.88 kg/m².    Physical Exam  Vitals signs and nursing note reviewed.   Constitutional:       Appearance: He is well-developed.   HENT:      Head: Normocephalic and atraumatic.      Right Ear: External ear normal.      Left Ear: External ear normal.      Nose: Nose normal.   Eyes:      General:         Right eye: No discharge.         Left eye: No discharge.      Conjunctiva/sclera: Conjunctivae normal.      Pupils: Pupils are equal, round, and reactive to light.      Comments: Strabismus right eye   Neck:      Musculoskeletal: Neck supple.      Thyroid: No thyromegaly.   Cardiovascular:      Rate and Rhythm: Normal rate and regular rhythm.      Heart sounds: No murmur. No friction rub. No gallop.       Comments: Tachycardia, regular  Pulmonary:      Effort: Pulmonary effort is normal. No respiratory distress.      Breath sounds: Normal breath sounds. No wheezing or rales.      Comments: Upper airway noise  Abdominal:      General: Abdomen is flat. Bowel sounds are normal. There is no distension.      Palpations: Abdomen is soft.      Tenderness: There is no abdominal tenderness.      Comments: PEG in place with mild redness, no  drainage    Foul smelling stool   Musculoskeletal:      Right lower leg: No edema.      Left lower leg: No edema.      Comments: Muscle atrophy  contractures   Lymphadenopathy:      Cervical: No cervical adenopathy.   Skin:     General: Skin is warm and dry.   Neurological:      Mental Status: He is alert.      Comments: Patient alert but otherwise not responsive and can not cooperate in exam, severe CP   Psychiatric:      Comments: Uneasy in bed. Rotating around           CRANIAL NERVES     CN III, IV, VI   Pupils are equal, round, and reactive to light.               Significant Labs: covid screen negative-- repeat pending   CBC:   Recent Labs   Lab 08/10/20  0644 20  0634   WBC 13.68* 12.59   HGB 12.2* 11.4*   HCT 39.1* 36.8*   * 415*     CMP:   Recent Labs   Lab 08/10/20  0644 20  0634    143   K 3.2* 3.4*   * 117*   CO2 22* 17*   GLU 89 90   BUN 11 11   CREATININE 0.7 0.7   CALCIUM 8.5* 8.4*   PROT 6.9 6.7   ALBUMIN 2.6* 2.7*   BILITOT 0.2 0.2   ALKPHOS 74 72   AST 28 21   ALT 49* 41   ANIONGAP 10 9   EGFRNONAA >60 >60      phenobarb 25.8 .    CRP 58.1    procal 0.21  Lactic acid 1.3    Urine Studies:   No results for input(s): COLORU, APPEARANCEUA, PHUR, SPECGRAV, PROTEINUA, GLUCUA, KETONESU, BILIRUBINUA, OCCULTUA, NITRITE, UROBILINOGEN, LEUKOCYTESUR, RBCUA, WBCUA, BACTERIA, SQUAMEPITHEL, HYALINECASTS in the last 48 hours.    Invalid input(s): WRIGHTSUR  c diff negative .      Blood cultures NGTD- in process x 2     Strep and flu negative     Significant Imagin/10/2020 [facility]  · Normal left ventricular systolic function. The estimated ejection fraction is 60%.  · No wall motion abnormalities.  · Normal LV diastolic function.  · Normal right ventricular systolic function.     CXR The lungs are clear, with normal appearance of pulmonary vasculature and no pleural effusion or pneumothorax.     The cardiac silhouette is normal in size. The hilar and mediastinal  contours are unremarkable.     Bones are intact.    8/4 CTA chest     1. No evidence of acute cardiopulmonary embolus.  2. Still a zone of increased density identified in the patient's posterior gutter on the left 12 mm in size may reflect a early infiltrate or suspicious pulmonary nodule formation.  On any matter, short-term interval follow-up within a 6 month interval to document stability imaging findings is advised based on the Fleischner criteria.  3. 6 mm cyst projecting in the posterior interpolar region of the left kidney.  4. Prominent distention of the esophagus with evidence of retention of fluid that may reflect findings as a manifestation of achalasia with scleroderma as there is of moderate tapering of the distal esophagus at the level of the gastroesophageal junction.       8/5 EKG Sinus tachycardia  Otherwise normal ECG  When compared with ECG of 15-OCT-2019 18:43,  Nonspecific T wave abnormality now evident in Anterior leads      Assessment/Plan:      * Hypokalemia  Replace IV and via PEG today  Labs in AM  Likely due to severe diarrhea  telemetry  K+ better 2.2>3.3 after 5 >> 10meq IV riders in ER and 20meq po   Will repeat g tube this am 40meq po once this am    8/6: resolved. Stop K in fluids today. Still having diarrhea so will keep 1 more day and repeat labs in morning to be sure remains stable as this was his main reason for admission  8/7 K+ 4.2     Resolved    8/9  Replace via G tube. Worsening with diarrhea  8/10 change KCL to powder for PEG tube   8/11 replace , due to  GI loss with significant diarrhea     Fever in adult  Not sure the cause of this.  In the last 2 months he has been innudated with antibiotics.  Cultures seem to always be negative.  Will get echo tomorrow.  Abx increased to cover mrsa. Addition of levaquin did not help with fevers, but wbc ct did improve. Will cont both for now.  Broaden work up.   If no obvious etiology will d/w id.  8/10 A little better with Vanc - no BM  over night, afebrile this am   Echo pending,   8/11 Echo stable, now with recurrent diarrhea and fever, CT of abd and pelvis- if negative for source will need transfer for ID eval, GI eval       Aspiration pneumonitis  Recently admitted to [facility] with this diagnosis  Started on Vanc yesterday ? RLL pneumonia   Not really convinced of this. First xr with questionable right sided changes. Repeat cxr clear.  8/11 Day 4 levofloxacin, day 3 Vanc- continues with fever and diarrhea     Leukocytosis  Differential broad  Low grade fevers  C. Diff possible  CXR clear  UA clear  Aspiration in differential    Ruled out PE with CTA  Hold antibx for now as lactic acid and procal is normal but low threshold to start if any decompensation overnight     8/6: resolved without antibx treatment. Could be dehydration.     8/7  Slight bump again today. Start cipro flagyl and check cxr again.    8/9  Wbc ct improved today. But fevers still very high. Start on vanc (d/c levaquin) with recent hospital admission. Monitor fever curve.  8/10 WBC 9.42> 13.68, Resp infection panel  and flu swab negative, Blood Cx - NGTD x 2 d   vanc started - afebrile this am,   8/11 WBC 13.68>12.59 , but now having recurrent diarrhea and fever     Diarrhea of presumed infectious origin  Antibiotic associate diarrhea vs true C. Diff  C. Diff pending  If worsening clinical status will start PO Vanc for treatment; holding tonight as true clinical picture not yet clear    8/6:  Check stool studies--pending. C. Diff negative  Change NS 150cc/hr  8/7 Blood Cx negative, afebrile but WBC trending up; will add Cipro + flagyl and cont for 7 days  Stool for Ova/parasites     8/8  Stool studies all negative. Started on flagyl anyway. Will give him probiotics as well.    8/9  Add in rotavirus panel today. Continued on flagyl.    8/10  Maybe this is an occult c diff? Interestingly it improved with addition of IV vanc?    8/11  Day 4 levofloxacin, day 3 Vanc- continues  with fever and diarrhea started up again , will need transfer for ID, GI       Tachypnea  Repeat COVID ordered; rapid negative but + cough, diarrhea, mild LFT elevation  CXR without infiltrates  D-dimer elevated so checking CTA for PE tonight. Will start heparin gtt if CTA positive  No antibx for now with normal procal but aspiration in differential as well so could add Zosyn if needed  8/5: POX 96% on RA and RR down to 18/min. Maybe dehydration/hypoK played a role here    8/6: resolved. COVID negative. RR 18 from 30-40 on admit.   8/7 remains with - check EKG verify sinus tach     8/8  CXR looks like he has a developing infiltrate. Levaquin started today. Has fever, increased wbc ct and now infiltrate. Reorder blood culture.    8/9  Consulted pulm. Will get echo tomorrow and consult cards.     Sinus tachycardia  NS 10cc/hr  Likely some dehydration from frequent diarrhea (very low muscle mass so I trust Cr less)  D-dimer elevated so assessing for PE as well  CTA negative PE    Mom unsure his baseline heart rate.     Started on propranolol yesterday    8/9  With continued tachycardia and fever. Given single 500 cc bolus last night. Remains tachycardic. Worse with fevers    8/10  Improved with propranolol and vanc.  8/11 Reviewed old progress notes ; BP documented but not HR- not sure of pts baseline     Cerebral palsy  Cont home phenobarbital and baclofen  Cont tube feeds per home routine via PEG  He is non-verbal and not mobile at baseline. He seems to be more interactive and grunting at TV today. Mom thinks this is his baseline        VTE Risk Mitigation (From admission, onward)         Ordered     IP VTE LOW RISK PATIENT  Once      08/04/20 7556                      Tony Zamora MD  Department of Hospital Medicine   [facility]

## 2020-08-11 NOTE — PLAN OF CARE
Fall precautions in place. POC reviewed with patient and mother. Coarse bs, diminished, RA. Turned q2hr. Excoriation noted to scrotum, stage I noted to left lower buttocks, barrier cream applied to both areas. Febrile   103 at midnight, ibuprofen 300mg given via Gtube, decreased to 99.6 tympanic. Remains tachycardic on the monitor in the teens, asymptomatic.

## 2020-08-11 NOTE — ASSESSMENT & PLAN NOTE
Antibiotic associate diarrhea vs true C. Diff  C. Diff pending  If worsening clinical status will start PO Vanc for treatment; holding tonight as true clinical picture not yet clear    8/6:  Check stool studies--pending. C. Diff negative  Change NS 150cc/hr  8/7 Blood Cx negative, afebrile but WBC trending up; will add Cipro + flagyl and cont for 7 days  Stool for Ova/parasites     8/8  Stool studies all negative. Started on flagyl anyway. Will give him probiotics as well.    8/9  Add in rotavirus panel today. Continued on flagyl.    8/10  Maybe this is an occult c diff? Interestingly it improved with addition of IV vanc?    8/11  Day 4 levofloxacin, day 3 Vanc- continues with fever and diarrhea started up again , will need transfer for ID, GI

## 2020-08-11 NOTE — ASSESSMENT & PLAN NOTE
Differential broad  Low grade fevers  C. Diff possible  CXR clear  UA clear  Aspiration in differential    Ruled out PE with CTA  Hold antibx for now as lactic acid and procal is normal but low threshold to start if any decompensation overnight     8/6: resolved without antibx treatment. Could be dehydration.     8/7  Slight bump again today. Start cipro flagyl and check cxr again.    8/9  Wbc ct improved today. But fevers still very high. Start on vanc (d/c levaquin) with recent hospital admission. Monitor fever curve.  8/10 WBC 9.42> 13.68, Resp infection panel  and flu swab negative, Blood Cx - NGTD x 2 d   vanc started - afebrile this am,   8/11 WBC 13.68>12.59 , but now having recurrent diarrhea and fever

## 2020-08-11 NOTE — ASSESSMENT & PLAN NOTE
Replace IV and via PEG today  Labs in AM  Likely due to severe diarrhea  telemetry  K+ better 2.2>3.3 after 5 >> 10meq IV riders in ER and 20meq po   Will repeat g tube this am 40meq po once this am    8/6: resolved. Stop K in fluids today. Still having diarrhea so will keep 1 more day and repeat labs in morning to be sure remains stable as this was his main reason for admission  8/7 K+ 4.2     Resolved    8/9  Replace via G tube. Worsening with diarrhea  8/10 change KCL to powder for PEG tube   8/11 replace , due to  GI loss with significant diarrhea

## 2020-08-11 NOTE — PLAN OF CARE
Patient still experiencing high fevers in the night; 103 last night.  Ibuprofren given for fever.  Diarrhea persists, patient at risk for skin breakdown due to frequent changing and cleaning; turning every 2 hours with pillows in use to protect skin.  CT of abdomen done with contrast, findings showed colitis.  Plans to rest belly; started on oral vancomycin placed in PEG tube.  2 tube feedings completed today; Isosource 1.5, 250mL into PEG for breakfast/lunch.    Heart monitor shows ST; on room air. BP stable.  Mother at bedside, educated on importance of caregiver input.  Glucose checks before meals and bedtime.

## 2020-08-11 NOTE — PROGRESS NOTES
Therapy with iv vancomycin is complete and/or consult discontinued by provider.  Pharmacy will sign off, please re-consult as needed.

## 2020-08-12 PROBLEM — L89.322 DECUBITUS ULCER OF LEFT BUTTOCK, STAGE 2: Status: ACTIVE | Noted: 2020-08-12

## 2020-08-12 LAB
ALBUMIN SERPL BCP-MCNC: 2.3 G/DL (ref 3.5–5.2)
ALP SERPL-CCNC: 71 U/L (ref 55–135)
ALT SERPL W/O P-5'-P-CCNC: 30 U/L (ref 10–44)
ANION GAP SERPL CALC-SCNC: 5 MMOL/L (ref 8–16)
ANISOCYTOSIS BLD QL SMEAR: SLIGHT
AST SERPL-CCNC: 15 U/L (ref 10–40)
BASOPHILS # BLD AUTO: ABNORMAL K/UL (ref 0–0.2)
BASOPHILS NFR BLD: 0 % (ref 0–1.9)
BILIRUB SERPL-MCNC: 0.1 MG/DL (ref 0.1–1)
BUN SERPL-MCNC: 6 MG/DL (ref 6–20)
CALCIUM SERPL-MCNC: 7.8 MG/DL (ref 8.7–10.5)
CHLORIDE SERPL-SCNC: 118 MMOL/L (ref 95–110)
CO2 SERPL-SCNC: 16 MMOL/L (ref 23–29)
CREAT SERPL-MCNC: 0.7 MG/DL (ref 0.5–1.4)
DIFFERENTIAL METHOD: ABNORMAL
DOHLE BOD BLD QL SMEAR: PRESENT
EOSINOPHIL # BLD AUTO: ABNORMAL K/UL (ref 0–0.5)
EOSINOPHIL NFR BLD: 18 % (ref 0–8)
ERYTHROCYTE [DISTWIDTH] IN BLOOD BY AUTOMATED COUNT: 15.1 % (ref 11.5–14.5)
EST. GFR  (AFRICAN AMERICAN): >60 ML/MIN/1.73 M^2
EST. GFR  (NON AFRICAN AMERICAN): >60 ML/MIN/1.73 M^2
GLUCOSE SERPL-MCNC: 136 MG/DL (ref 70–110)
HCT VFR BLD AUTO: 35 % (ref 40–54)
HGB BLD-MCNC: 11 G/DL (ref 14–18)
IMM GRANULOCYTES # BLD AUTO: ABNORMAL K/UL (ref 0–0.04)
IMM GRANULOCYTES NFR BLD AUTO: ABNORMAL % (ref 0–0.5)
LYMPHOCYTES # BLD AUTO: ABNORMAL K/UL (ref 1–4.8)
LYMPHOCYTES NFR BLD: 13 % (ref 18–48)
MCH RBC QN AUTO: 29.2 PG (ref 27–31)
MCHC RBC AUTO-ENTMCNC: 31.4 G/DL (ref 32–36)
MCV RBC AUTO: 93 FL (ref 82–98)
MONOCYTES # BLD AUTO: ABNORMAL K/UL (ref 0.3–1)
MONOCYTES NFR BLD: 5 % (ref 4–15)
NEUTROPHILS NFR BLD: 55 % (ref 38–73)
NEUTS BAND NFR BLD MANUAL: 9 %
NRBC BLD-RTO: 0 /100 WBC
PLATELET # BLD AUTO: 403 K/UL (ref 150–350)
PLATELET BLD QL SMEAR: ABNORMAL
PMV BLD AUTO: 9.5 FL (ref 9.2–12.9)
POCT GLUCOSE: 137 MG/DL (ref 70–110)
POCT GLUCOSE: 85 MG/DL (ref 70–110)
POCT GLUCOSE: 94 MG/DL (ref 70–110)
POCT GLUCOSE: 98 MG/DL (ref 70–110)
POTASSIUM SERPL-SCNC: 3.6 MMOL/L (ref 3.5–5.1)
PROT SERPL-MCNC: 6 G/DL (ref 6–8.4)
RBC # BLD AUTO: 3.77 M/UL (ref 4.6–6.2)
SODIUM SERPL-SCNC: 139 MMOL/L (ref 136–145)
TOXIC GRANULES BLD QL SMEAR: PRESENT
WBC # BLD AUTO: 12.79 K/UL (ref 3.9–12.7)

## 2020-08-12 PROCEDURE — 85027 COMPLETE CBC AUTOMATED: CPT

## 2020-08-12 PROCEDURE — 36415 COLL VENOUS BLD VENIPUNCTURE: CPT

## 2020-08-12 PROCEDURE — 25000003 PHARM REV CODE 250: Performed by: INTERNAL MEDICINE

## 2020-08-12 PROCEDURE — 94761 N-INVAS EAR/PLS OXIMETRY MLT: CPT

## 2020-08-12 PROCEDURE — 80053 COMPREHEN METABOLIC PANEL: CPT

## 2020-08-12 PROCEDURE — 11000001 HC ACUTE MED/SURG PRIVATE ROOM

## 2020-08-12 PROCEDURE — 99233 PR SUBSEQUENT HOSPITAL CARE,LEVL III: ICD-10-PCS | Mod: ,,, | Performed by: FAMILY MEDICINE

## 2020-08-12 PROCEDURE — 25000003 PHARM REV CODE 250: Performed by: FAMILY MEDICINE

## 2020-08-12 PROCEDURE — 85007 BL SMEAR W/DIFF WBC COUNT: CPT

## 2020-08-12 PROCEDURE — 99233 SBSQ HOSP IP/OBS HIGH 50: CPT | Mod: ,,, | Performed by: FAMILY MEDICINE

## 2020-08-12 PROCEDURE — 25000003 PHARM REV CODE 250: Performed by: NURSE PRACTITIONER

## 2020-08-12 RX ORDER — DEXTROSE MONOHYDRATE, SODIUM CHLORIDE, AND POTASSIUM CHLORIDE 50; 1.49; 4.5 G/1000ML; G/1000ML; G/1000ML
INJECTION, SOLUTION INTRAVENOUS CONTINUOUS
Status: DISCONTINUED | OUTPATIENT
Start: 2020-08-12 | End: 2020-08-17

## 2020-08-12 RX ORDER — DEXTROSE MONOHYDRATE, SODIUM CHLORIDE, AND POTASSIUM CHLORIDE 50; 1.49; 4.5 G/1000ML; G/1000ML; G/1000ML
INJECTION, SOLUTION INTRAVENOUS CONTINUOUS
Status: DISCONTINUED | OUTPATIENT
Start: 2020-08-12 | End: 2020-08-15

## 2020-08-12 RX ADMIN — DEXTROSE, SODIUM CHLORIDE, AND POTASSIUM CHLORIDE: 5; .45; .15 INJECTION INTRAVENOUS at 09:08

## 2020-08-12 RX ADMIN — DEXTROSE, SODIUM CHLORIDE, AND POTASSIUM CHLORIDE: 5; .45; .15 INJECTION INTRAVENOUS at 05:08

## 2020-08-12 RX ADMIN — PROPRANOLOL HYDROCHLORIDE 20 MG: 20 TABLET ORAL at 09:08

## 2020-08-12 RX ADMIN — LEVOFLOXACIN 500 MG: 500 TABLET, FILM COATED ORAL at 09:08

## 2020-08-12 RX ADMIN — BACLOFEN 10 MG: 10 TABLET ORAL at 09:08

## 2020-08-12 RX ADMIN — VANCOMYCIN HYDROCHLORIDE 125 MG: KIT at 12:08

## 2020-08-12 RX ADMIN — PROPRANOLOL HYDROCHLORIDE 20 MG: 20 TABLET ORAL at 03:08

## 2020-08-12 RX ADMIN — VANCOMYCIN HYDROCHLORIDE 125 MG: KIT at 06:08

## 2020-08-12 RX ADMIN — GABAPENTIN 250 MG: 250 SOLUTION ORAL at 09:08

## 2020-08-12 RX ADMIN — METRONIDAZOLE 500 MG: 500 TABLET ORAL at 05:08

## 2020-08-12 RX ADMIN — DEXTROSE, SODIUM CHLORIDE, AND POTASSIUM CHLORIDE: 5; .45; .15 INJECTION INTRAVENOUS at 06:08

## 2020-08-12 RX ADMIN — PHENOBARBITAL 100 MG: 20 ELIXIR ORAL at 09:08

## 2020-08-12 RX ADMIN — METRONIDAZOLE 500 MG: 500 TABLET ORAL at 09:08

## 2020-08-12 RX ADMIN — BACLOFEN 10 MG: 10 TABLET ORAL at 03:08

## 2020-08-12 RX ADMIN — VANCOMYCIN HYDROCHLORIDE 125 MG: KIT at 05:08

## 2020-08-12 RX ADMIN — METRONIDAZOLE 500 MG: 500 TABLET ORAL at 03:08

## 2020-08-12 NOTE — ASSESSMENT & PLAN NOTE
Antibiotic associate diarrhea vs true C. Diff  C. Diff pending  If worsening clinical status will start PO Vanc for treatment; holding tonight as true clinical picture not yet clear    8/6:  Check stool studies--pending. C. Diff negative  Change NS 150cc/hr  8/7 Blood Cx negative, afebrile but WBC trending up; will add Cipro + flagyl and cont for 7 days  Stool for Ova/parasites   8/12 CT abd suggestive of colitis IV vanc changed to PER PEG , continue flagyl day 5/7 , Tmax 99.3 overnight     8/8  Stool studies all negative. Started on flagyl anyway. Will give him probiotics as well.    8/9  Add in rotavirus panel today. Continued on flagyl.    8/10  Maybe this is an occult c diff? Interestingly it improved with addition of IV vanc?    8/11  Day 4 levofloxacin, day 3 Vanc- continues with fever and diarrhea started up again , will need transfer for ID, GI    8/12 This is C diff colitis clinically although C diff test is negative.  Colitis on CT and he is responding to first 24 hours of Vanc per gastrostomy. No fever in over 24 hours. Will keep NPG until diarrhea improves as it is causing perianal skin breakdown. D5 1/2 NS with KCL for maintenance fluids

## 2020-08-12 NOTE — ASSESSMENT & PLAN NOTE
Differential broad  Low grade fevers  C. Diff possible  CXR clear  UA clear  Aspiration in differential    Ruled out PE with CTA  Hold antibx for now as lactic acid and procal is normal but low threshold to start if any decompensation overnight     8/6: resolved without antibx treatment. Could be dehydration.     8/7  Slight bump again today. Start cipro flagyl and check cxr again.    8/9  Wbc ct improved today. But fevers still very high. Start on vanc (d/c levaquin) with recent hospital admission. Monitor fever curve.  8/10 WBC 9.42> 13.68, Resp infection panel  and flu swab negative, Blood Cx - NGTD x 2 d   vanc started - afebrile this am,   8/11 WBC 13.68>12.59 , but now having recurrent diarrhea and fever   8/12 WBC 12.59> 12.79, expect to improve with Tx for colitis

## 2020-08-12 NOTE — PLAN OF CARE
Patient is compliant with fluid and medication management.  Patient is compliant with with all lab testing and procedures.  Patient remains free from all falls and injury.  VSS.  NPO.   No feeds with peg tube today.  Barrier cream applied to scrotum.  IV fluids.  Tachycardic.  Plan of care reviewed and agreed upon with patient.  Will continue to monitor.

## 2020-08-12 NOTE — PROGRESS NOTES
"[facility] Medicine  Progress Note    Patient Name: Glen Moscoso  MRN: [id number]  Patient Class: IP- Inpatient   Admission Date: 8/4/2020  Length of Stay: 7 days  Attending Physician: Anjelica Brandt MD  Primary Care Provider: Yadi Lawson MD        Subjective:     Principal Problem:Hypokalemia        HPI:  Patient presented to ER with fever and cough. Patient has cerebral palsy and is non-verbal. Mother provides history. He was seen at [facility] ER 2 weeks ago and diagnosed with bronchitis and strep throat. Treated with amoxil. He completed antibx 4 days ago. Mom reports loose watery stools 3-4x a day for almost 2 weeks now. She noted he had fever yesterday "He was burning up" but did not check with thermometer. She notes he is breathing a little heavier than normal. + cough. She does not think he is in any pain. No changes to tube feeds (Ensure 4x a day via PEG). No sick contacts or recent travel but was in [facility] ER and was hospitalized at [place] before that for "aspiration" and on antibx then. Patient has low grade temp 99.5. HR 120s. RR 30s. Sats 96% on RA. Rapid COVID negative however note mild elevation LFTs, LDH, ferritin pending. D-dimer elevated, CTA pending. Lactic acid and procalcitonin normal. Repeat COVID swab ordered. WBC 15K. UA not infectious. K 2.2--likely due to diarrhea. GFR > 60 (Cr 0.7 but very low muscle mass). CXR clear. Flu and strep negative. Admitted for treatment of hypokalemia and possible C. Diff vs COVID vs PE.     Overview/Hospital Course:  CTA done this am negative for PE. POX 96% RR 18. He is still tachycardiac. With low grade fever. And diarrhea persist. K+ is better after 5-10meq KCL riders and 20meq via g tube.     8/6: COVID negative. Still having diarrhea. Still tachycardia (rate 120s and regular); mom doesn't know if this is his baseline. RR back to normal. He is more alert and interactive with TV. K is normal. Stool studies still pending. C. Diff " negative.     8/7/2020 Blood Cx negative x 3d- but WBC trending up WBC 12.08> 14.45, will add Cipro , repeat CXR   Patient had 3 loose stools last shift/voiding per diaper. Stool was sent to lab for ova and parasites as ordered, c-diff negative    K+ 4.2   Getting IVFs @ 150ml/hr , HR Remains 114 sinus tach    8/8  Fever overnight. More agitated. HR up to 150s. Given propranolol and HR down to 100s. Less agitated. Slept okay. Still with foul smelling stool.    8/9  Switched from cipro to levaquin to cover for potential right lower lobe developing infiltrate. However, last night, he had higher fever. Discussed at length with mom that he has been on >10 rounds of different antibiotics over the last 2 months. Not always with a definitive diagnosis. Currently, he is still having foul smelling abnormally colored stools. He is non verbal and does not grimace with movement of his arms or legs or head. He also is unphased by abdominal exam.    8/10/2020 Tmax 101.9 , FUO, ? Infectious diarrhea tx ; cipro changed to Levofloxacin day 3 to cover for ? RLL pneumonia - but cxr not convincing,   Still getting flagyl. Switched to vanc and since, no fever, no diarrhea. Long discussion regarding ~11 abx in the past 2 months, has never been on Vancomycin  Did total body exam to rule out possible skin soft tissue etiology;   ? Echo pending;   BP stable, -116 , afebrile this am so some improvement with Vanc-   Resp infection panel  and flu swab negative, Blood Cx - NGTD x 2 d , repeat Covid negative, C- diff negative     8/11/2020 Tmax 103, 100.7 this am , multiple watery stools overnight, started yesterday afternoon and now has stage 2 buttock wound  Echo with normal EF60%, no mention of valvular abn but  Overall the study quality was poor. The study was difficult due to patient's uncooperativeness, clinical status and poor endocardial visualization.   He gets ensure plus feedings  at home and getting Isos[employer] here; could be  source of diarrhea ? But has fever also   Getting Vanc and flagyl ; day 3 vanc, day 4 levofloxacin/flagyl   Multiple stool negative for rotavirus, O&P negative   Abdomen is non tender but - Will CT abd and pelvis; if negative then will need transfer for ID/GI   This was discussed with mother     8/12 CT of abd pelivs yesterday suggestive of colitis and Subtle ground-glass opacity in the right lung base which could represent a developing inflammatory/infectious process or sequela of aspiration.   Started on Vanc per Peg tube, flagyl day 5 , Tmax 99.3 , did have some blood stools , tube feeding on hold , will resume in a few days  Now getting IV D5 1/2 with KCL 20meq   day 5  Levofloxacin for pneumonia ,   Turning q2 for buttocks with breakdown from recurrent diarrhea, moisture barrier applied   Noted to have low BP early this am; given 250ml IVF bolus , BP currently 118/70,  , o2 sat stable on RA      Review of Systems   Unable to perform ROS: Patient nonverbal     Objective:     Vital Signs (Most Recent):  Temp: 98.7 °F (37.1 °C) (08/12/20 0720)  Pulse: 105 (08/12/20 0720)  Resp: (!) 24 (08/12/20 0720)  BP: 118/70 (08/12/20 0720)  SpO2: 95 % (08/12/20 0725) Vital Signs (24h Range):  Temp:  [98.1 °F (36.7 °C)-99.6 °F (37.6 °C)] 98.7 °F (37.1 °C)  Pulse:  [] 105  Resp:  [18-24] 24  SpO2:  [94 %-99 %] 95 %  BP: ()/(55-74) 118/70     Weight: 35.4 kg (78 lb)  Body mass index is 16.88 kg/m².    Physical Exam  Vitals signs and nursing note reviewed.   Constitutional:       Appearance: He is well-developed.   HENT:      Head: Normocephalic and atraumatic.      Right Ear: External ear normal.      Left Ear: External ear normal.      Nose: Nose normal.   Eyes:      General:         Right eye: No discharge.         Left eye: No discharge.      Conjunctiva/sclera: Conjunctivae normal.      Pupils: Pupils are equal, round, and reactive to light.      Comments: Strabismus right eye   Neck:      Musculoskeletal:  Neck supple.      Thyroid: No thyromegaly.   Cardiovascular:      Rate and Rhythm: Normal rate and regular rhythm.      Heart sounds: No murmur. No friction rub. No gallop.       Comments: Tachycardia, regular  Pulmonary:      Effort: Pulmonary effort is normal. No respiratory distress.      Breath sounds: Normal breath sounds. No wheezing or rales.      Comments: Upper airway noise  Abdominal:      General: Abdomen is flat. Bowel sounds are normal. There is no distension.      Palpations: Abdomen is soft.      Tenderness: There is no abdominal tenderness.      Comments: PEG in place with mild redness, no drainage    Foul smelling stool   Musculoskeletal:      Right lower leg: No edema.      Left lower leg: No edema.      Comments: Muscle atrophy  contractures   Lymphadenopathy:      Cervical: No cervical adenopathy.   Skin:     General: Skin is warm and dry.   Neurological:      Mental Status: He is alert.      Comments: Patient alert but otherwise not responsive and can not cooperate in exam, severe CP   Psychiatric:      Comments: Uneasy in bed. Rotating around           CRANIAL NERVES     CN III, IV, VI   Pupils are equal, round, and reactive to light.               Significant Labs: covid screen negative-- repeat pending   CBC:   Recent Labs   Lab 08/11/20  0634 08/12/20  0623   WBC 12.59 12.79*   HGB 11.4* 11.0*   HCT 36.8* 35.0*   * 403*     CMP:   Recent Labs   Lab 08/11/20  0634 08/12/20  0623    139   K 3.4* 3.6   * 118*   CO2 17* 16*   GLU 90 136*   BUN 11 6   CREATININE 0.7 0.7   CALCIUM 8.4* 7.8*   PROT 6.7 6.0   ALBUMIN 2.7* 2.3*   BILITOT 0.2 0.1   ALKPHOS 72 71   AST 21 15   ALT 41 30   ANIONGAP 9 5*   EGFRNONAA >60 >60     8/4 phenobarb 25.8 .    CRP 58.1    procal 0.21  Lactic acid 1.3    Urine Studies:   No results for input(s): COLORU, APPEARANCEUA, PHUR, SPECGRAV, PROTEINUA, GLUCUA, KETONESU, BILIRUBINUA, OCCULTUA, NITRITE, UROBILINOGEN, LEUKOCYTESUR, RBCUA, WBCUA,  BACTERIA, SQUAMEPITHEL, HYALINECASTS in the last 48 hours.    Invalid input(s): WRIGHTSUR  c diff negative .      Blood cultures NGTD- in process x 2     Strep and flu negative     Significant Imaging:     CT abd/pelvis 8/11/2020   Imaging findings suggestive for a colitis, most probably involving the rectosigmoid colon with inflammatory and infectious etiologies to be primarily considered.  Ischemia is felt unlikely.  Pseudomembranous colitis would also be consideration.  There is a small amount of free fluid in the pelvis which is likely related to the inflammatory changes of the bowel.     Abnormal thickening of the walls of the urinary bladder which could suggest a cystitis.  Correlation with urinalysis recommended.     Subtle ground-glass opacity in the right lung base which could represent a developing inflammatory/infectious process or sequela of aspiration.        8/10/2020 [facility]  · Normal left ventricular systolic function. The estimated ejection fraction is 60%.  · No wall motion abnormalities.  · Normal LV diastolic function.  · Normal right ventricular systolic function.    8/4 CXR The lungs are clear, with normal appearance of pulmonary vasculature and no pleural effusion or pneumothorax.     The cardiac silhouette is normal in size. The hilar and mediastinal contours are unremarkable.     Bones are intact.    8/4 CTA chest     1. No evidence of acute cardiopulmonary embolus.  2. Still a zone of increased density identified in the patient's posterior gutter on the left 12 mm in size may reflect a early infiltrate or suspicious pulmonary nodule formation.  On any matter, short-term interval follow-up within a 6 month interval to document stability imaging findings is advised based on the Fleischner criteria.  3. 6 mm cyst projecting in the posterior interpolar region of the left kidney.  4. Prominent distention of the esophagus with evidence of retention of fluid that may reflect findings as a  manifestation of achalasia with scleroderma as there is of moderate tapering of the distal esophagus at the level of the gastroesophageal junction.       8/5 EKG Sinus tachycardia  Otherwise normal ECG  When compared with ECG of 15-OCT-2019 18:43,  Nonspecific T wave abnormality now evident in Anterior leads      Assessment/Plan:      * Hypokalemia  Replace IV and via PEG today  Labs in AM  Likely due to severe diarrhea  telemetry  K+ better 2.2>3.3 after 5 >> 10meq IV riders in ER and 20meq po   Will repeat g tube this am 40meq po once this am    8/6: resolved. Stop K in fluids today. Still having diarrhea so will keep 1 more day and repeat labs in morning to be sure remains stable as this was his main reason for admission  8/7 K+ 4.2     Resolved    8/9  Replace via G tube. Worsening with diarrhea  8/10 change KCL to powder for PEG tube   8/11 replace , due to  GI loss with significant diarrhea   8/12 continues with some diarrhea now with blood noted, K+ better 3.6 ; now getting D5 1/2 with KCL     Decubitus ulcer of left buttock, stage 2    Moisture barrier oitment  Offload, turning q 2     Fever in adult  Not sure the cause of this.  In the last 2 months he has been innudated with antibiotics.  Cultures seem to always be negative.  Will get echo tomorrow.  Abx increased to cover mrsa. Addition of levaquin did not help with fevers, but wbc ct did improve. Will cont both for now.  Broaden work up.   If no obvious etiology will d/w id.  8/10 A little better with Vanc - no BM over night, afebrile this am   Echo pending,   8/11 Echo stable, now with recurrent diarrhea and fever, CT of abd and pelvis- if negative for source will need transfer for ID eval, GI eval   8/12 IV vanc change to PEG to Tx colitis demonstrated per CT scan, temp trending down finally     Aspiration pneumonitis  Recently admitted to [place] with this diagnosis  Started on Vanc yesterday ? RLL pneumonia   Not really convinced of this. First xr  with questionable right sided changes. Repeat cxr clear.  8/11 Day 4 levofloxacin, day 3 Vanc- continues with fever and diarrhea   8/12 Day 5 levoflxacin, afebrile; CT abd and pelvis yesterday noting Subtle ground-glass opacity in the right lung base which could represent a developing inflammatory/infectious process or sequela of aspiration.      Leukocytosis  Differential broad  Low grade fevers  C. Diff possible  CXR clear  UA clear  Aspiration in differential    Ruled out PE with CTA  Hold antibx for now as lactic acid and procal is normal but low threshold to start if any decompensation overnight     8/6: resolved without antibx treatment. Could be dehydration.     8/7  Slight bump again today. Start cipro flagyl and check cxr again.    8/9  Wbc ct improved today. But fevers still very high. Start on vanc (d/c levaquin) with recent hospital admission. Monitor fever curve.  8/10 WBC 9.42> 13.68, Resp infection panel  and flu swab negative, Blood Cx - NGTD x 2 d   vanc started - afebrile this am,   8/11 WBC 13.68>12.59 , but now having recurrent diarrhea and fever   8/12 WBC 12.59> 12.79, expect to improve with Tx for colitis      Diarrhea of presumed infectious origin  Antibiotic associate diarrhea vs true C. Diff  C. Diff pending  If worsening clinical status will start PO Vanc for treatment; holding tonight as true clinical picture not yet clear    8/6:  Check stool studies--pending. C. Diff negative  Change NS 150cc/hr  8/7 Blood Cx negative, afebrile but WBC trending up; will add Cipro + flagyl and cont for 7 days  Stool for Ova/parasites   8/12 CT abd suggestive of colitis IV vanc changed to PER PEG , continue flagyl day 5/7 , Tmax 99.3 overnight     8/8  Stool studies all negative. Started on flagyl anyway. Will give him probiotics as well.    8/9  Add in rotavirus panel today. Continued on flagyl.    8/10  Maybe this is an occult c diff? Interestingly it improved with addition of IV vanc?    8/11  Day 4  levofloxacin, day 3 Vanc- continues with fever and diarrhea started up again , will need transfer for ID, GI    8/12 This is C diff colitis clinically although C diff test is negative.  Colitis on CT and he is responding to first 24 hours of Vanc per gastrostomy. No fever in over 24 hours. Will keep NPG until diarrhea improves as it is causing perianal skin breakdown. D5 1/2 NS with KCL for maintenance fluids        Tachypnea  Repeat COVID ordered; rapid negative but + cough, diarrhea, mild LFT elevation  CXR without infiltrates  D-dimer elevated so checking CTA for PE tonight. Will start heparin gtt if CTA positive  No antibx for now with normal procal but aspiration in differential as well so could add Zosyn if needed  8/5: POX 96% on RA and RR down to 18/min. Maybe dehydration/hypoK played a role here    8/6: resolved. COVID negative. RR 18 from 30-40 on admit.   8/7 remains with - check EKG verify sinus tach     8/8  CXR looks like he has a developing infiltrate. Levaquin started today. Has fever, increased wbc ct and now infiltrate. Reorder blood culture.    8/9  Consulted pulm. Will get echo tomorrow and consult cards.   Better     Sinus tachycardia  NS 10cc/hr  Likely some dehydration from frequent diarrhea (very low muscle mass so I trust Cr less)  D-dimer elevated so assessing for PE as well  CTA negative PE    Mom unsure his baseline heart rate.     Started on propranolol yesterday    8/9  With continued tachycardia and fever. Given single 500 cc bolus last night. Remains tachycardic. Worse with fevers    8/10  Improved with propranolol and vanc.  8/11 Reviewed old progress notes ; BP documented but not HR- not sure of pts baseline     Cerebral palsy  Cont home phenobarbital and baclofen  Cont tube feeds per home routine via PEG  He is non-verbal and not mobile at baseline. He seems to be more interactive and grunting at TV today. Mom thinks this is his baseline        VTE Risk Mitigation (From  admission, onward)         Ordered     IP VTE LOW RISK PATIENT  Once      08/04/20 3695                      Tony Zamora MD  Department of Hospital Medicine   [facility]

## 2020-08-12 NOTE — ASSESSMENT & PLAN NOTE
Recently admitted to [employer] [facility] with this diagnosis  Started on Vanc yesterday ? RLL pneumonia   Not really convinced of this. First xr with questionable right sided changes. Repeat cxr clear.  8/11 Day 4 levofloxacin, day 3 Vanc- continues with fever and diarrhea   8/12 Day 5 levoflxacin, afebrile; CT abd and pelvis yesterday noting Subtle ground-glass opacity in the right lung base which could represent a developing inflammatory/infectious process or sequela of aspiration.

## 2020-08-12 NOTE — ASSESSMENT & PLAN NOTE
Not sure the cause of this.  In the last 2 months he has been innudated with antibiotics.  Cultures seem to always be negative.  Will get echo tomorrow.  Abx increased to cover mrsa. Addition of levaquin did not help with fevers, but wbc ct did improve. Will cont both for now.  Broaden work up.   If no obvious etiology will d/w id.  8/10 A little better with Vanc - no BM over night, afebrile this am   Echo pending,   8/11 Echo stable, now with recurrent diarrhea and fever, CT of abd and pelvis- if negative for source will need transfer for ID eval, GI eval   8/12 IV vanc change to PEG to Tx colitis demonstrated per CT scan, temp trending down finally

## 2020-08-12 NOTE — ASSESSMENT & PLAN NOTE
Replace IV and via PEG today  Labs in AM  Likely due to severe diarrhea  telemetry  K+ better 2.2>3.3 after 5 >> 10meq IV riders in ER and 20meq po   Will repeat g tube this am 40meq po once this am    8/6: resolved. Stop K in fluids today. Still having diarrhea so will keep 1 more day and repeat labs in morning to be sure remains stable as this was his main reason for admission  8/7 K+ 4.2     Resolved    8/9  Replace via G tube. Worsening with diarrhea  8/10 change KCL to powder for PEG tube   8/11 replace , due to  GI loss with significant diarrhea   8/12 continues with some diarrhea now with blood noted, K+ better 3.6 ; now getting D5 1/2 with KCL

## 2020-08-12 NOTE — PLAN OF CARE
Fall precautions in place, mother at bedside. Copious amounts of sputum suctioned from mouth tonight, coarse breath sounds. Turned q2hr. Noted moderate amount of blood mixed in diarrhea. Afibrile, antibiotic therapy continues. Remains tachycardic on the monitor, asymptomatic.

## 2020-08-12 NOTE — SUBJECTIVE & OBJECTIVE
Review of Systems   Unable to perform ROS: Patient nonverbal     Objective:     Vital Signs (Most Recent):  Temp: 98.7 °F (37.1 °C) (08/12/20 0720)  Pulse: 105 (08/12/20 0720)  Resp: (!) 24 (08/12/20 0720)  BP: 118/70 (08/12/20 0720)  SpO2: 95 % (08/12/20 0725) Vital Signs (24h Range):  Temp:  [98.1 °F (36.7 °C)-99.6 °F (37.6 °C)] 98.7 °F (37.1 °C)  Pulse:  [] 105  Resp:  [18-24] 24  SpO2:  [94 %-99 %] 95 %  BP: ()/(55-74) 118/70     Weight: 35.4 kg (78 lb)  Body mass index is 16.88 kg/m².    Physical Exam  Vitals signs and nursing note reviewed.   Constitutional:       Appearance: He is well-developed.   HENT:      Head: Normocephalic and atraumatic.      Right Ear: External ear normal.      Left Ear: External ear normal.      Nose: Nose normal.   Eyes:      General:         Right eye: No discharge.         Left eye: No discharge.      Conjunctiva/sclera: Conjunctivae normal.      Pupils: Pupils are equal, round, and reactive to light.      Comments: Strabismus right eye   Neck:      Musculoskeletal: Neck supple.      Thyroid: No thyromegaly.   Cardiovascular:      Rate and Rhythm: Normal rate and regular rhythm.      Heart sounds: No murmur. No friction rub. No gallop.       Comments: Tachycardia, regular  Pulmonary:      Effort: Pulmonary effort is normal. No respiratory distress.      Breath sounds: Normal breath sounds. No wheezing or rales.      Comments: Upper airway noise  Abdominal:      General: Abdomen is flat. Bowel sounds are normal. There is no distension.      Palpations: Abdomen is soft.      Tenderness: There is no abdominal tenderness.      Comments: PEG in place with mild redness, no drainage    Foul smelling stool   Musculoskeletal:      Right lower leg: No edema.      Left lower leg: No edema.      Comments: Muscle atrophy  contractures   Lymphadenopathy:      Cervical: No cervical adenopathy.   Skin:     General: Skin is warm and dry.   Neurological:      Mental Status: He is  alert.      Comments: Patient alert but otherwise not responsive and can not cooperate in exam, severe CP   Psychiatric:      Comments: Uneasy in bed. Rotating around           CRANIAL NERVES     CN III, IV, VI   Pupils are equal, round, and reactive to light.               Significant Labs: covid screen negative-- repeat pending   CBC:   Recent Labs   Lab 08/11/20 0634 08/12/20  0623   WBC 12.59 12.79*   HGB 11.4* 11.0*   HCT 36.8* 35.0*   * 403*     CMP:   Recent Labs   Lab 08/11/20  0634 08/12/20  0623    139   K 3.4* 3.6   * 118*   CO2 17* 16*   GLU 90 136*   BUN 11 6   CREATININE 0.7 0.7   CALCIUM 8.4* 7.8*   PROT 6.7 6.0   ALBUMIN 2.7* 2.3*   BILITOT 0.2 0.1   ALKPHOS 72 71   AST 21 15   ALT 41 30   ANIONGAP 9 5*   EGFRNONAA >60 >60     8/4 phenobarb 25.8 .    CRP 58.1    procal 0.21  Lactic acid 1.3    Urine Studies:   No results for input(s): COLORU, APPEARANCEUA, PHUR, SPECGRAV, PROTEINUA, GLUCUA, KETONESU, BILIRUBINUA, OCCULTUA, NITRITE, UROBILINOGEN, LEUKOCYTESUR, RBCUA, WBCUA, BACTERIA, SQUAMEPITHEL, HYALINECASTS in the last 48 hours.    Invalid input(s): WRIGHTSUR  c diff negative .      Blood cultures NGTD- in process x 2     Strep and flu negative     Significant Imaging:     CT abd/pelvis 8/11/2020   Imaging findings suggestive for a colitis, most probably involving the rectosigmoid colon with inflammatory and infectious etiologies to be primarily considered.  Ischemia is felt unlikely.  Pseudomembranous colitis would also be consideration.  There is a small amount of free fluid in the pelvis which is likely related to the inflammatory changes of the bowel.     Abnormal thickening of the walls of the urinary bladder which could suggest a cystitis.  Correlation with urinalysis recommended.     Subtle ground-glass opacity in the right lung base which could represent a developing inflammatory/infectious process or sequela of aspiration.        8/10/2020 [facility]  · Normal left  ventricular systolic function. The estimated ejection fraction is 60%.  · No wall motion abnormalities.  · Normal LV diastolic function.  · Normal right ventricular systolic function.    8/4 CXR The lungs are clear, with normal appearance of pulmonary vasculature and no pleural effusion or pneumothorax.     The cardiac silhouette is normal in size. The hilar and mediastinal contours are unremarkable.     Bones are intact.    8/4 CTA chest     1. No evidence of acute cardiopulmonary embolus.  2. Still a zone of increased density identified in the patient's posterior gutter on the left 12 mm in size may reflect a early infiltrate or suspicious pulmonary nodule formation.  On any matter, short-term interval follow-up within a 6 month interval to document stability imaging findings is advised based on the Fleischner criteria.  3. 6 mm cyst projecting in the posterior interpolar region of the left kidney.  4. Prominent distention of the esophagus with evidence of retention of fluid that may reflect findings as a manifestation of achalasia with scleroderma as there is of moderate tapering of the distal esophagus at the level of the gastroesophageal junction.       8/5 EKG Sinus tachycardia  Otherwise normal ECG  When compared with ECG of 15-OCT-2019 18:43,  Nonspecific T wave abnormality now evident in Anterior leads

## 2020-08-12 NOTE — ASSESSMENT & PLAN NOTE
Repeat COVID ordered; rapid negative but + cough, diarrhea, mild LFT elevation  CXR without infiltrates  D-dimer elevated so checking CTA for PE tonight. Will start heparin gtt if CTA positive  No antibx for now with normal procal but aspiration in differential as well so could add Zosyn if needed  8/5: POX 96% on RA and RR down to 18/min. Maybe dehydration/hypoK played a role here    8/6: resolved. COVID negative. RR 18 from 30-40 on admit.   8/7 remains with - check EKG verify sinus tach     8/8  CXR looks like he has a developing infiltrate. Levaquin started today. Has fever, increased wbc ct and now infiltrate. Reorder blood culture.    8/9  Consulted pulm. Will get echo tomorrow and consult cards.   Better

## 2020-08-13 LAB
ALBUMIN SERPL BCP-MCNC: 2.5 G/DL (ref 3.5–5.2)
ALP SERPL-CCNC: 75 U/L (ref 55–135)
ALT SERPL W/O P-5'-P-CCNC: 24 U/L (ref 10–44)
ANION GAP SERPL CALC-SCNC: 10 MMOL/L (ref 8–16)
AST SERPL-CCNC: 16 U/L (ref 10–40)
BASOPHILS # BLD AUTO: ABNORMAL K/UL (ref 0–0.2)
BASOPHILS NFR BLD: 0 % (ref 0–1.9)
BILIRUB SERPL-MCNC: 0.2 MG/DL (ref 0.1–1)
BUN SERPL-MCNC: 3 MG/DL (ref 6–20)
CALCIUM SERPL-MCNC: 8.1 MG/DL (ref 8.7–10.5)
CHLORIDE SERPL-SCNC: 116 MMOL/L (ref 95–110)
CO2 SERPL-SCNC: 13 MMOL/L (ref 23–29)
CREAT SERPL-MCNC: 0.6 MG/DL (ref 0.5–1.4)
DIFFERENTIAL METHOD: ABNORMAL
EOSINOPHIL # BLD AUTO: ABNORMAL K/UL (ref 0–0.5)
EOSINOPHIL NFR BLD: 8 % (ref 0–8)
ERYTHROCYTE [DISTWIDTH] IN BLOOD BY AUTOMATED COUNT: 15 % (ref 11.5–14.5)
EST. GFR  (AFRICAN AMERICAN): >60 ML/MIN/1.73 M^2
EST. GFR  (NON AFRICAN AMERICAN): >60 ML/MIN/1.73 M^2
GLUCOSE SERPL-MCNC: 86 MG/DL (ref 70–110)
HCT VFR BLD AUTO: 36.3 % (ref 40–54)
HGB BLD-MCNC: 11.5 G/DL (ref 14–18)
IMM GRANULOCYTES # BLD AUTO: ABNORMAL K/UL (ref 0–0.04)
IMM GRANULOCYTES NFR BLD AUTO: ABNORMAL % (ref 0–0.5)
LYMPHOCYTES # BLD AUTO: ABNORMAL K/UL (ref 1–4.8)
LYMPHOCYTES NFR BLD: 15 % (ref 18–48)
MCH RBC QN AUTO: 28.9 PG (ref 27–31)
MCHC RBC AUTO-ENTMCNC: 31.7 G/DL (ref 32–36)
MCV RBC AUTO: 91 FL (ref 82–98)
MONOCYTES # BLD AUTO: ABNORMAL K/UL (ref 0.3–1)
MONOCYTES NFR BLD: 10 % (ref 4–15)
NEUTROPHILS NFR BLD: 48 % (ref 38–73)
NEUTS BAND NFR BLD MANUAL: 19 %
NRBC BLD-RTO: 0 /100 WBC
PLATELET # BLD AUTO: 444 K/UL (ref 150–350)
PLATELET BLD QL SMEAR: ABNORMAL
PMV BLD AUTO: 9.8 FL (ref 9.2–12.9)
POCT GLUCOSE: 81 MG/DL (ref 70–110)
POCT GLUCOSE: 83 MG/DL (ref 70–110)
POCT GLUCOSE: 85 MG/DL (ref 70–110)
POTASSIUM SERPL-SCNC: 3.4 MMOL/L (ref 3.5–5.1)
PROT SERPL-MCNC: 6.3 G/DL (ref 6–8.4)
RBC # BLD AUTO: 3.98 M/UL (ref 4.6–6.2)
SODIUM SERPL-SCNC: 139 MMOL/L (ref 136–145)
WBC # BLD AUTO: 11.23 K/UL (ref 3.9–12.7)

## 2020-08-13 PROCEDURE — 25000003 PHARM REV CODE 250: Performed by: FAMILY MEDICINE

## 2020-08-13 PROCEDURE — 94761 N-INVAS EAR/PLS OXIMETRY MLT: CPT

## 2020-08-13 PROCEDURE — 99233 SBSQ HOSP IP/OBS HIGH 50: CPT | Mod: ,,, | Performed by: FAMILY MEDICINE

## 2020-08-13 PROCEDURE — 36415 COLL VENOUS BLD VENIPUNCTURE: CPT

## 2020-08-13 PROCEDURE — 85027 COMPLETE CBC AUTOMATED: CPT

## 2020-08-13 PROCEDURE — 25000003 PHARM REV CODE 250: Performed by: INTERNAL MEDICINE

## 2020-08-13 PROCEDURE — 80053 COMPREHEN METABOLIC PANEL: CPT

## 2020-08-13 PROCEDURE — 99233 PR SUBSEQUENT HOSPITAL CARE,LEVL III: ICD-10-PCS | Mod: ,,, | Performed by: FAMILY MEDICINE

## 2020-08-13 PROCEDURE — 25000003 PHARM REV CODE 250: Performed by: NURSE PRACTITIONER

## 2020-08-13 PROCEDURE — 11000001 HC ACUTE MED/SURG PRIVATE ROOM

## 2020-08-13 PROCEDURE — 85007 BL SMEAR W/DIFF WBC COUNT: CPT

## 2020-08-13 RX ADMIN — PHENOBARBITAL 100 MG: 20 ELIXIR ORAL at 09:08

## 2020-08-13 RX ADMIN — BACLOFEN 10 MG: 10 TABLET ORAL at 02:08

## 2020-08-13 RX ADMIN — VANCOMYCIN HYDROCHLORIDE 125 MG: KIT at 06:08

## 2020-08-13 RX ADMIN — VANCOMYCIN HYDROCHLORIDE 125 MG: KIT at 05:08

## 2020-08-13 RX ADMIN — BACLOFEN 10 MG: 10 TABLET ORAL at 09:08

## 2020-08-13 RX ADMIN — METRONIDAZOLE 500 MG: 500 TABLET ORAL at 02:08

## 2020-08-13 RX ADMIN — METRONIDAZOLE 500 MG: 500 TABLET ORAL at 09:08

## 2020-08-13 RX ADMIN — DEXTROSE, SODIUM CHLORIDE, AND POTASSIUM CHLORIDE: 5; .45; .15 INJECTION INTRAVENOUS at 08:08

## 2020-08-13 RX ADMIN — METRONIDAZOLE 500 MG: 500 TABLET ORAL at 05:08

## 2020-08-13 RX ADMIN — PROPRANOLOL HYDROCHLORIDE 20 MG: 20 TABLET ORAL at 09:08

## 2020-08-13 RX ADMIN — VANCOMYCIN HYDROCHLORIDE 125 MG: KIT at 12:08

## 2020-08-13 RX ADMIN — LEVOFLOXACIN 500 MG: 500 TABLET, FILM COATED ORAL at 08:08

## 2020-08-13 RX ADMIN — BACLOFEN 10 MG: 10 TABLET ORAL at 08:08

## 2020-08-13 RX ADMIN — VANCOMYCIN HYDROCHLORIDE 125 MG: KIT at 11:08

## 2020-08-13 RX ADMIN — PROPRANOLOL HYDROCHLORIDE 20 MG: 20 TABLET ORAL at 08:08

## 2020-08-13 RX ADMIN — PROPRANOLOL HYDROCHLORIDE 20 MG: 20 TABLET ORAL at 02:08

## 2020-08-13 RX ADMIN — GABAPENTIN 250 MG: 250 SOLUTION ORAL at 09:08

## 2020-08-13 RX ADMIN — DEXTROSE, SODIUM CHLORIDE, AND POTASSIUM CHLORIDE: 5; .45; .15 INJECTION INTRAVENOUS at 06:08

## 2020-08-13 NOTE — ASSESSMENT & PLAN NOTE
Recently admitted to [facility] with this diagnosis  Started on Vanc yesterday ? RLL pneumonia   Not really convinced of this. First xr with questionable right sided changes. Repeat cxr clear.  8/11 Day 4 levofloxacin, day 3 Vanc- continues with fever and diarrhea   8/12 Day 5 levoflxacin, afebrile; CT abd and pelvis yesterday noting Subtle ground-glass opacity in the right lung base which could represent a developing inflammatory/infectious process or sequela of aspiration.  8/12 Day 6 Levofloxacin 500mg per PEG for pneumonia - Right lung per CT

## 2020-08-13 NOTE — PROGRESS NOTES
"[facility]  Adult Nutrition  Progress Note    SUMMARY       Recommendations    Recommendation:   1. Consider probiotics to help with diarrhea  2. Please put tube feeding order in EPIC   3. Continue Isosource 1.5 Bolus Feeds @ 4 cans/day to provide 1500 kcals, 68 g pro, and 764 mL enteral fluid      A. Fluid flush of 160 mL q6hrs or per MD     Interventions:  Enteral nutrition  Collaboration with other providers     Goals: meet >85% of needs by discharge  Nutrition Goal Status: progressing towards goal  Communication of RD Recs: (POC)    Reason for Assessment    Reason For Assessment: RD follow-up  Diagnosis: (Hypokalemia)  Relevant Medical History: acid reflux, cerebral palsy, PEG tube, seizures    General Information Comments: Per nursing notes, no feeds were given yesterday. Diarrhea continues to be an issue, more than likely related to antibiotics rather than TF formula; pt was having diarrhea PTA as well and he is currently on multiple types of antibiotics inpatient. Due to recent hospital wide restrictions to limit the transfer of (COVID-19), we are not performing any physical exams at this time. All S/S will be observational; NFPE to be performed at a future date.    Nutrition Discharge Planning: nutrition through PEG    Nutrition Risk Screen    Nutrition Risk Screen: no indicators present    Nutrition/Diet History    Patient Reported Diet/Restrictions/Preferences: no oral intake  Food Allergies: NKFA  Factors Affecting Nutritional Intake: NPO  Nutrition Support Formula Prior to Admit: Other (see comments)(Ensure)  Nutrtion Support Frequency Prior to Admit: 4 cartons/day    Anthropometrics    Temp: 98.3 °F (36.8 °C)  Height: 4' 9" (144.8 cm)  Height (inches): 57 in  Weight Method: Bed Scale  Weight: 35.4 kg (78 lb)  Weight (lb): 78 lb  Ideal Body Weight (IBW), Male: 88 lb  % Ideal Body Weight, Male (lb): 88.64 %  BMI (Calculated): 16.9  BMI Grade: 16 - 16.9 protein-energy malnutrition grade " II       Lab/Procedures/Meds    Pertinent Labs Reviewed: reviewed  Pertinent Labs Comments: Potassium 3.4, BUN 3, albumin 2.5  Pertinent Medications Reviewed: reviewed  Pertinent Medications Comments: abx, D% and 1/2 NS w/ KCl @ 100 mL    Physical Findings/Assessment    stage I noted to left lower buttocks    Estimated/Assessed Needs    Weight Used For Calorie Calculations: 35.4 kg (78 lb)  Energy Calorie Requirements (kcal): 1400  Energy Need Method: [place]-St Jeor(*1.2)  Protein Requirements: 35-42 g(1-1.2 g/kg)  Weight Used For Protein Calculations: 35.4 kg (78 lb)     Estimated Fluid Requirement Method: RDA Method  RDA Method (mL): 1400     Nutrition Prescription Ordered    Current Diet Order: NPO  Current Nutrition Support Formula Ordered: Isosource 1.5  Current Nutrition Support Frequency Ordered: 4 cans/d    Evaluation of Received Nutrient/Fluid Intake    Enteral Calories (kcal): 0  Enteral Protein (gm): 0  IV Fluid (mL): (D% and 1/2 NS w/ KCl @ 100 mL)  I/O: +22.6 L since admit  Energy Calories Required: not meeting needs  Protein Required: not meeting needs  Fluid Required: meeting needs  Comments: TFs were no given yesterday  Tolerance: tolerating    Nutrition Risk    Level of Risk/Frequency of Follow-up: moderate(1-2x/wk)     Assessment and Plan  Nutrition Problem:  Inadequate oral intake     Related to (etiology):   Cerebral palsy     Signs and Symptoms (as evidenced by):   Nutrition through PEG     Interventions:  Enteral nutrition  Collaboration with other providers     Nutrition Diagnosis Status:   Continues    Monitor and Evaluation    Food and Nutrient Intake: enteral nutrition intake, energy intake  Food and Nutrient Adminstration: enteral and parenteral nutrition administration  Anthropometric Measurements: weight, weight change  Biochemical Data, Medical Tests and Procedures: electrolyte and renal panel, gastrointestinal profile, glucose/endocrine profile, lipid profile, inflammatory  profile  Nutrition-Focused Physical Findings: overall appearance     Malnutrition Assessment  Malnutrition Type: (NFPE not completed 2/2 COVID testing be done, no signs of malnutrition at this time)      Nutrition Follow-Up    RD Follow-up?: Yes

## 2020-08-13 NOTE — ASSESSMENT & PLAN NOTE
Antibiotic associate diarrhea vs true C. Diff  C. Diff pending  If worsening clinical status will start PO Vanc for treatment; holding tonight as true clinical picture not yet clear    8/6:  Check stool studies--pending. C. Diff negative  Change NS 150cc/hr  8/7 Blood Cx negative, afebrile but WBC trending up; will add Cipro + flagyl and cont for 7 days  Stool for Ova/parasites   8/12 CT abd suggestive of colitis IV vanc changed to PER PEG , continue flagyl day 5/7 , Tmax 99.3 overnight     8/8  Stool studies all negative. Started on flagyl anyway. Will give him probiotics as well.    8/9  Add in rotavirus panel today. Continued on flagyl.    8/10  Maybe this is an occult c diff? Interestingly it improved with addition of IV vanc?    8/11  Day 4 levofloxacin, day 3 Vanc- continues with fever and diarrhea started up again , will need transfer for ID, GI    8/12 This is C diff colitis clinically although C diff test is negative.  Colitis on CT and he is responding to first 24 hours of Vanc per gastrostomy. No fever in over 24 hours. Will keep NPG until diarrhea improves as it is causing perianal skin breakdown. D5 1/2 NS with KCL for maintenance fluids    8/13 + Colitis, may have been C- diff despite negative stools, Has has multiple ABX over the past 2 months  Continue Vanc per PEG - day2 , day 6 flagyl. I would not resume PEG feeds until diarrhea clears up as it is causing skin breakdown

## 2020-08-13 NOTE — ASSESSMENT & PLAN NOTE
NS 10cc/hr  Likely some dehydration from frequent diarrhea (very low muscle mass so I trust Cr less)  D-dimer elevated so assessing for PE as well  CTA negative PE    Mom unsure his baseline heart rate.     Started on propranolol yesterday    8/9  With continued tachycardia and fever. Given single 500 cc bolus last night. Remains tachycardic. Worse with fevers    8/10  Improved with propranolol and vanc.  8/11 Reviewed old progress notes ; BP documented but not HR- not sure of pts baseline   8/13 Improving with IVF and treatment of presumed cdiff colitis

## 2020-08-13 NOTE — PROGRESS NOTES
"[facility] Medicine  Progress Note    Patient Name: Glen Moscoso  MRN: [id number]  Patient Class: IP- Inpatient   Admission Date: 8/4/2020  Length of Stay: 8 days  Attending Physician: Anjelica Brandt MD  Primary Care Provider: Yadi Lawson MD        Subjective:     Principal Problem:Hypokalemia        HPI:  Patient presented to ER with fever and cough. Patient has cerebral palsy and is non-verbal. Mother provides history. He was seen at [facility] ER 2 weeks ago and diagnosed with bronchitis and strep throat. Treated with amoxil. He completed antibx 4 days ago. Mom reports loose watery stools 3-4x a day for almost 2 weeks now. She noted he had fever yesterday "He was burning up" but did not check with thermometer. She notes he is breathing a little heavier than normal. + cough. She does not think he is in any pain. No changes to tube feeds (Ensure 4x a day via PEG). No sick contacts or recent travel but was in [facility] ER and was hospitalized at [place] before that for "aspiration" and on antibx then. Patient has low grade temp 99.5. HR 120s. RR 30s. Sats 96% on RA. Rapid COVID negative however note mild elevation LFTs, LDH, ferritin pending. D-dimer elevated, CTA pending. Lactic acid and procalcitonin normal. Repeat COVID swab ordered. WBC 15K. UA not infectious. K 2.2--likely due to diarrhea. GFR > 60 (Cr 0.7 but very low muscle mass). CXR clear. Flu and strep negative. Admitted for treatment of hypokalemia and possible C. Diff vs COVID vs PE.     Overview/Hospital Course:  CTA done this am negative for PE. POX 96% RR 18. He is still tachycardiac. With low grade fever. And diarrhea persist. K+ is better after 5-10meq KCL riders and 20meq via g tube.     8/6: COVID negative. Still having diarrhea. Still tachycardia (rate 120s and regular); mom doesn't know if this is his baseline. RR back to normal. He is more alert and interactive with TV. K is normal. Stool studies still pending. C. Diff " negative.     8/7/2020 Blood Cx negative x 3d- but WBC trending up WBC 12.08> 14.45, will add Cipro , repeat CXR   Patient had 3 loose stools last shift/voiding per diaper. Stool was sent to lab for ova and parasites as ordered, c-diff negative    K+ 4.2   Getting IVFs @ 150ml/hr , HR Remains 114 sinus tach    8/8  Fever overnight. More agitated. HR up to 150s. Given propranolol and HR down to 100s. Less agitated. Slept okay. Still with foul smelling stool.    8/9  Switched from cipro to levaquin to cover for potential right lower lobe developing infiltrate. However, last night, he had higher fever. Discussed at length with mom that he has been on >10 rounds of different antibiotics over the last 2 months. Not always with a definitive diagnosis. Currently, he is still having foul smelling abnormally colored stools. He is non verbal and does not grimace with movement of his arms or legs or head. He also is unphased by abdominal exam.    8/10/2020 Tmax 101.9 , FUO, ? Infectious diarrhea tx ; cipro changed to Levofloxacin day 3 to cover for ? RLL pneumonia - but cxr not convincing,   Still getting flagyl. Switched to vanc and since, no fever, no diarrhea. Long discussion regarding ~11 abx in the past 2 months, has never been on Vancomycin  Did total body exam to rule out possible skin soft tissue etiology;   ? Echo pending;   BP stable, -116 , afebrile this am so some improvement with Vanc-   Resp infection panel  and flu swab negative, Blood Cx - NGTD x 2 d , repeat Covid negative, C- diff negative     8/11/2020 Tmax 103, 100.7 this am , multiple watery stools overnight, started yesterday afternoon and now has stage 2 buttock wound  Echo with normal EF60%, no mention of valvular abn but  Overall the study quality was poor. The study was difficult due to patient's uncooperativeness, clinical status and poor endocardial visualization.   He gets ensure plus feedings  at home and getting Isosource here; could be  source of diarrhea ? But has fever also   Getting Vanc and flagyl ; day 3 vanc, day 4 levofloxacin/flagyl   Multiple stool negative for rotavirus, O&P negative   Abdomen is non tender but - Will CT abd and pelvis; if negative then will need transfer for ID/GI   This was discussed with mother     8/12 CT of abd pelivs yesterday suggestive of colitis and Subtle ground-glass opacity in the right lung base which could represent a developing inflammatory/infectious process or sequela of aspiration.   Started on Vanc per Peg tube, flagyl day 5 , Tmax 99.3 , did have some blood stools , tube feeding on hold , will resume in a few days  Now getting IV D5 1/2 with KCL 20meq   day 5  Levofloxacin for pneumonia ,   Turning q2 for buttocks with breakdown from recurrent diarrhea, moisture barrier applied   Noted to have low BP early this am; given 250ml IVF bolus , BP currently 118/70,  , o2 sat stable on RA    8/13 Day 2 of Vanc per peg , day 6 flagyl for colitis,  Day 6 Levofloxacin per PEG for Pneumonia  Tmax 99.4,  WBC finally trending down   Hold Peg feedings, getting IVFs , still having lots of diarrhea but getting better       Review of Systems   Unable to perform ROS: Patient nonverbal     Objective:     Vital Signs (Most Recent):  Temp: 98.3 °F (36.8 °C) (08/13/20 0725)  Pulse: 96 (08/13/20 0725)  Resp: (!) 26 (08/13/20 0725)  BP: 114/79 (08/13/20 0725)  SpO2: 100 % (08/13/20 0743) Vital Signs (24h Range):  Temp:  [96.8 °F (36 °C)-99.4 °F (37.4 °C)] 98.3 °F (36.8 °C)  Pulse:  [] 96  Resp:  [18-26] 26  SpO2:  [95 %-100 %] 100 %  BP: ()/(58-79) 114/79     Weight: 35.4 kg (78 lb)  Body mass index is 16.88 kg/m².    Physical Exam  Vitals signs and nursing note reviewed.   Constitutional:       Appearance: He is well-developed.   HENT:      Head: Normocephalic and atraumatic.      Right Ear: External ear normal.      Left Ear: External ear normal.      Nose: Nose normal.   Eyes:      General:         Right  eye: No discharge.         Left eye: No discharge.      Conjunctiva/sclera: Conjunctivae normal.      Pupils: Pupils are equal, round, and reactive to light.      Comments: Strabismus right eye   Neck:      Musculoskeletal: Neck supple.      Thyroid: No thyromegaly.   Cardiovascular:      Rate and Rhythm: Normal rate and regular rhythm.      Heart sounds: No murmur. No friction rub. No gallop.       Comments: Tachycardia, regular  Pulmonary:      Effort: Pulmonary effort is normal. No respiratory distress.      Breath sounds: Normal breath sounds. No wheezing or rales.   Abdominal:      General: Abdomen is flat. Bowel sounds are normal. There is no distension.      Palpations: Abdomen is soft.      Tenderness: There is no abdominal tenderness.      Comments: PEG in place with mild redness, no drainage    Foul smelling stool   Musculoskeletal:      Right lower leg: No edema.      Left lower leg: No edema.      Comments: Muscle atrophy  contractures   Lymphadenopathy:      Cervical: No cervical adenopathy.   Skin:     General: Skin is warm and dry.   Neurological:      Mental Status: He is alert.      Comments: Patient alert but otherwise not responsive and can not cooperate in exam, severe CP   Psychiatric:      Comments: Uneasy in bed. Rotating around           CRANIAL NERVES     CN III, IV, VI   Pupils are equal, round, and reactive to light.               Significant Labs: covid screen negative-- repeat pending   CBC:   Recent Labs   Lab 08/12/20  0623 08/13/20  0625   WBC 12.79* 11.23   HGB 11.0* 11.5*   HCT 35.0* 36.3*   * 444*     CMP:   Recent Labs   Lab 08/12/20  0623 08/13/20  0625    139   K 3.6 3.4*   * 116*   CO2 16* 13*   * 86   BUN 6 3*   CREATININE 0.7 0.6   CALCIUM 7.8* 8.1*   PROT 6.0 6.3   ALBUMIN 2.3* 2.5*   BILITOT 0.1 0.2   ALKPHOS 71 75   AST 15 16   ALT 30 24   ANIONGAP 5* 10   EGFRNONAA >60 >60     8/4 phenobarb 25.8 .    CRP 58.1    procal 0.21  Lactic acid  1.3    Urine Studies:   No results for input(s): COLORU, APPEARANCEUA, PHUR, SPECGRAV, PROTEINUA, GLUCUA, KETONESU, BILIRUBINUA, OCCULTUA, NITRITE, UROBILINOGEN, LEUKOCYTESUR, RBCUA, WBCUA, BACTERIA, SQUAMEPITHEL, HYALINECASTS in the last 48 hours.    Invalid input(s): WRIGHTSUR  c diff negative .      Blood cultures NGTD- in process x 2     Strep and flu negative     Significant Imaging:     CT abd/pelvis 8/11/2020   Imaging findings suggestive for a colitis, most probably involving the rectosigmoid colon with inflammatory and infectious etiologies to be primarily considered.  Ischemia is felt unlikely.  Pseudomembranous colitis would also be consideration.  There is a small amount of free fluid in the pelvis which is likely related to the inflammatory changes of the bowel.     Abnormal thickening of the walls of the urinary bladder which could suggest a cystitis.  Correlation with urinalysis recommended.     Subtle ground-glass opacity in the right lung base which could represent a developing inflammatory/infectious process or sequela of aspiration.        8/10/2020 [facility]  · Normal left ventricular systolic function. The estimated ejection fraction is 60%.  · No wall motion abnormalities.  · Normal LV diastolic function.  · Normal right ventricular systolic function.    8/4 CXR The lungs are clear, with normal appearance of pulmonary vasculature and no pleural effusion or pneumothorax.     The cardiac silhouette is normal in size. The hilar and mediastinal contours are unremarkable.     Bones are intact.    8/4 CTA chest     1. No evidence of acute cardiopulmonary embolus.  2. Still a zone of increased density identified in the patient's posterior gutter on the left 12 mm in size may reflect a early infiltrate or suspicious pulmonary nodule formation.  On any matter, short-term interval follow-up within a 6 month interval to document stability imaging findings is advised based on the Fleischner criteria.  3. 6 mm  cyst projecting in the posterior interpolar region of the left kidney.  4. Prominent distention of the esophagus with evidence of retention of fluid that may reflect findings as a manifestation of achalasia with scleroderma as there is of moderate tapering of the distal esophagus at the level of the gastroesophageal junction.       8/5 EKG Sinus tachycardia  Otherwise normal ECG  When compared with ECG of 15-OCT-2019 18:43,  Nonspecific T wave abnormality now evident in Anterior leads      Assessment/Plan:      * Hypokalemia  Replace IV and via PEG today  Labs in AM  Likely due to severe diarrhea  telemetry  K+ better 2.2>3.3 after 5 >> 10meq IV riders in ER and 20meq po   Will repeat g tube this am 40meq po once this am    8/6: resolved. Stop K in fluids today. Still having diarrhea so will keep 1 more day and repeat labs in morning to be sure remains stable as this was his main reason for admission  8/7 K+ 4.2     Resolved    8/9  Replace via G tube. Worsening with diarrhea  8/10 change KCL to powder for PEG tube   8/11 replace , due to  GI loss with significant diarrhea   8/12 continues with some diarrhea now with blood noted, K+ better 3.6 ; now getting D5 1/2 with KCL   8/13 K+ 3.4- replace     Decubitus ulcer of left buttock, stage 2    Moisture barrier oitment  Offload, turning q 2     Fever in adult  Not sure the cause of this.  In the last 2 months he has been innudated with antibiotics.  Cultures seem to always be negative.  Will get echo tomorrow.  Abx increased to cover mrsa. Addition of levaquin did not help with fevers, but wbc ct did improve. Will cont both for now.  Broaden work up.   If no obvious etiology will d/w id.  8/10 A little better with Vanc - no BM over night, afebrile this am   Echo pending,   8/11 Echo stable, now with recurrent diarrhea and fever, CT of abd and pelvis- if negative for source will need transfer for ID eval, GI eval   8/12 IV vanc change to PEG to Tx colitis demonstrated  per CT scan, temp trending down finally   8/13 Tmax 99.4     Aspiration pneumonitis  Recently admitted to [place] with this diagnosis  Started on Vanc yesterday ? RLL pneumonia   Not really convinced of this. First xr with questionable right sided changes. Repeat cxr clear.  8/11 Day 4 levofloxacin, day 3 Vanc- continues with fever and diarrhea   8/12 Day 5 levoflxacin, afebrile; CT abd and pelvis yesterday noting Subtle ground-glass opacity in the right lung base which could represent a developing inflammatory/infectious process or sequela of aspiration.  8/12 Day 6 Levofloxacin 500mg per PEG for pneumonia - Right lung per CT     Leukocytosis  Differential broad  Low grade fevers  C. Diff possible  CXR clear  UA clear  Aspiration in differential    Ruled out PE with CTA  Hold antibx for now as lactic acid and procal is normal but low threshold to start if any decompensation overnight     8/6: resolved without antibx treatment. Could be dehydration.     8/7  Slight bump again today. Start cipro flagyl and check cxr again.    8/9  Wbc ct improved today. But fevers still very high. Start on vanc (d/c levaquin) with recent hospital admission. Monitor fever curve.  8/10 WBC 9.42> 13.68, Resp infection panel  and flu swab negative, Blood Cx - NGTD x 2 d   vanc started - afebrile this am,   8/11 WBC 13.68>12.59 , but now having recurrent diarrhea and fever   8/12 WBC 12.59> 12.79, expect to improve with Tx for colitis    8/13 WBC 11.23 trending down finally     Diarrhea of presumed infectious origin  Antibiotic associate diarrhea vs true C. Diff  C. Diff pending  If worsening clinical status will start PO Vanc for treatment; holding tonight as true clinical picture not yet clear    8/6:  Check stool studies--pending. C. Diff negative  Change NS 150cc/hr  8/7 Blood Cx negative, afebrile but WBC trending up; will add Cipro + flagyl and cont for 7 days  Stool for Ova/parasites   8/12 CT abd suggestive of colitis IV vanc  changed to PER PEG , continue flagyl day 5/7 , Tmax 99.3 overnight     8/8  Stool studies all negative. Started on flagyl anyway. Will give him probiotics as well.    8/9  Add in rotavirus panel today. Continued on flagyl.    8/10  Maybe this is an occult c diff? Interestingly it improved with addition of IV vanc?    8/11  Day 4 levofloxacin, day 3 Vanc- continues with fever and diarrhea started up again , will need transfer for ID, GI    8/12 This is C diff colitis clinically although C diff test is negative.  Colitis on CT and he is responding to first 24 hours of Vanc per gastrostomy. No fever in over 24 hours. Will keep NPG until diarrhea improves as it is causing perianal skin breakdown. D5 1/2 NS with KCL for maintenance fluids    8/13 + Colitis, may have been C- diff despite negative stools, Has has multiple ABX over the past 2 months  Continue Vanc per PEG - day2 , day 6 flagyl. I would not resume PEG feeds until diarrhea clears up as it is causing skin breakdown       Tachypnea  Repeat COVID ordered; rapid negative but + cough, diarrhea, mild LFT elevation  CXR without infiltrates  D-dimer elevated so checking CTA for PE tonight. Will start heparin gtt if CTA positive  No antibx for now with normal procal but aspiration in differential as well so could add Zosyn if needed  8/5: POX 96% on RA and RR down to 18/min. Maybe dehydration/hypoK played a role here    8/6: resolved. COVID negative. RR 18 from 30-40 on admit.   8/7 remains with - check EKG verify sinus tach     8/8  CXR looks like he has a developing infiltrate. Levaquin started today. Has fever, increased wbc ct and now infiltrate. Reorder blood culture.    8/9  Consulted pulm. Will get echo tomorrow and consult cards.   Better     Sinus tachycardia  NS 10cc/hr  Likely some dehydration from frequent diarrhea (very low muscle mass so I trust Cr less)  D-dimer elevated so assessing for PE as well  CTA negative PE    Mom unsure his baseline heart  rate.     Started on propranolol yesterday    8/9  With continued tachycardia and fever. Given single 500 cc bolus last night. Remains tachycardic. Worse with fevers    8/10  Improved with propranolol and vanc.  8/11 Reviewed old progress notes ; BP documented but not HR- not sure of pts baseline   8/13 Improving with IVF and treatment of presumed cdiff colitis     Cerebral palsy  Cont home phenobarbital and baclofen  Cont tube feeds per home routine via PEG  He is non-verbal and not mobile at baseline. He seems to be more interactive and grunting at TV today. Mom thinks this is his baseline        VTE Risk Mitigation (From admission, onward)         Ordered     IP VTE LOW RISK PATIENT  Once      08/04/20 6200                Discharge Planning   ROCÍO:      Code Status: Full Code   Is the patient medically ready for discharge?:     Reason for patient still in hospital (select all that apply): Treatment  Discharge Plan A: Home Health                  Tony Zamora MD  Department of Hospital Medicine   [facility]

## 2020-08-13 NOTE — ASSESSMENT & PLAN NOTE
Replace IV and via PEG today  Labs in AM  Likely due to severe diarrhea  telemetry  K+ better 2.2>3.3 after 5 >> 10meq IV riders in ER and 20meq po   Will repeat g tube this am 40meq po once this am    8/6: resolved. Stop K in fluids today. Still having diarrhea so will keep 1 more day and repeat labs in morning to be sure remains stable as this was his main reason for admission  8/7 K+ 4.2     Resolved    8/9  Replace via G tube. Worsening with diarrhea  8/10 change KCL to powder for PEG tube   8/11 replace , due to  GI loss with significant diarrhea   8/12 continues with some diarrhea now with blood noted, K+ better 3.6 ; now getting D5 1/2 with KCL   8/13 K+ 3.4- replace

## 2020-08-13 NOTE — ASSESSMENT & PLAN NOTE
Differential broad  Low grade fevers  C. Diff possible  CXR clear  UA clear  Aspiration in differential    Ruled out PE with CTA  Hold antibx for now as lactic acid and procal is normal but low threshold to start if any decompensation overnight     8/6: resolved without antibx treatment. Could be dehydration.     8/7  Slight bump again today. Start cipro flagyl and check cxr again.    8/9  Wbc ct improved today. But fevers still very high. Start on vanc (d/c levaquin) with recent hospital admission. Monitor fever curve.  8/10 WBC 9.42> 13.68, Resp infection panel  and flu swab negative, Blood Cx - NGTD x 2 d   vanc started - afebrile this am,   8/11 WBC 13.68>12.59 , but now having recurrent diarrhea and fever   8/12 WBC 12.59> 12.79, expect to improve with Tx for colitis    8/13 WBC 11.23 trending down finally

## 2020-08-13 NOTE — SUBJECTIVE & OBJECTIVE
Review of Systems   Unable to perform ROS: Patient nonverbal     Objective:     Vital Signs (Most Recent):  Temp: 98.3 °F (36.8 °C) (08/13/20 0725)  Pulse: 96 (08/13/20 0725)  Resp: (!) 26 (08/13/20 0725)  BP: 114/79 (08/13/20 0725)  SpO2: 100 % (08/13/20 0743) Vital Signs (24h Range):  Temp:  [96.8 °F (36 °C)-99.4 °F (37.4 °C)] 98.3 °F (36.8 °C)  Pulse:  [] 96  Resp:  [18-26] 26  SpO2:  [95 %-100 %] 100 %  BP: ()/(58-79) 114/79     Weight: 35.4 kg (78 lb)  Body mass index is 16.88 kg/m².    Physical Exam  Vitals signs and nursing note reviewed.   Constitutional:       Appearance: He is well-developed.   HENT:      Head: Normocephalic and atraumatic.      Right Ear: External ear normal.      Left Ear: External ear normal.      Nose: Nose normal.   Eyes:      General:         Right eye: No discharge.         Left eye: No discharge.      Conjunctiva/sclera: Conjunctivae normal.      Pupils: Pupils are equal, round, and reactive to light.      Comments: Strabismus right eye   Neck:      Musculoskeletal: Neck supple.      Thyroid: No thyromegaly.   Cardiovascular:      Rate and Rhythm: Normal rate and regular rhythm.      Heart sounds: No murmur. No friction rub. No gallop.       Comments: Tachycardia, regular  Pulmonary:      Effort: Pulmonary effort is normal. No respiratory distress.      Breath sounds: Normal breath sounds. No wheezing or rales.   Abdominal:      General: Abdomen is flat. Bowel sounds are normal. There is no distension.      Palpations: Abdomen is soft.      Tenderness: There is no abdominal tenderness.      Comments: PEG in place with mild redness, no drainage    Foul smelling stool   Musculoskeletal:      Right lower leg: No edema.      Left lower leg: No edema.      Comments: Muscle atrophy  contractures   Lymphadenopathy:      Cervical: No cervical adenopathy.   Skin:     General: Skin is warm and dry.   Neurological:      Mental Status: He is alert.      Comments: Patient alert  but otherwise not responsive and can not cooperate in exam, severe CP   Psychiatric:      Comments: Uneasy in bed. Rotating around           CRANIAL NERVES     CN III, IV, VI   Pupils are equal, round, and reactive to light.               Significant Labs: covid screen negative-- repeat pending   CBC:   Recent Labs   Lab 08/12/20 0623 08/13/20 0625   WBC 12.79* 11.23   HGB 11.0* 11.5*   HCT 35.0* 36.3*   * 444*     CMP:   Recent Labs   Lab 08/12/20 0623 08/13/20 0625    139   K 3.6 3.4*   * 116*   CO2 16* 13*   * 86   BUN 6 3*   CREATININE 0.7 0.6   CALCIUM 7.8* 8.1*   PROT 6.0 6.3   ALBUMIN 2.3* 2.5*   BILITOT 0.1 0.2   ALKPHOS 71 75   AST 15 16   ALT 30 24   ANIONGAP 5* 10   EGFRNONAA >60 >60     8/4 phenobarb 25.8 .    CRP 58.1    procal 0.21  Lactic acid 1.3    Urine Studies:   No results for input(s): COLORU, APPEARANCEUA, PHUR, SPECGRAV, PROTEINUA, GLUCUA, KETONESU, BILIRUBINUA, OCCULTUA, NITRITE, UROBILINOGEN, LEUKOCYTESUR, RBCUA, WBCUA, BACTERIA, SQUAMEPITHEL, HYALINECASTS in the last 48 hours.    Invalid input(s): WRIGHTSUR  c diff negative .      Blood cultures NGTD- in process x 2     Strep and flu negative     Significant Imaging:     CT abd/pelvis 8/11/2020   Imaging findings suggestive for a colitis, most probably involving the rectosigmoid colon with inflammatory and infectious etiologies to be primarily considered.  Ischemia is felt unlikely.  Pseudomembranous colitis would also be consideration.  There is a small amount of free fluid in the pelvis which is likely related to the inflammatory changes of the bowel.     Abnormal thickening of the walls of the urinary bladder which could suggest a cystitis.  Correlation with urinalysis recommended.     Subtle ground-glass opacity in the right lung base which could represent a developing inflammatory/infectious process or sequela of aspiration.        8/10/2020 [facility]  · Normal left ventricular systolic function. The  estimated ejection fraction is 60%.  · No wall motion abnormalities.  · Normal LV diastolic function.  · Normal right ventricular systolic function.    8/4 CXR The lungs are clear, with normal appearance of pulmonary vasculature and no pleural effusion or pneumothorax.     The cardiac silhouette is normal in size. The hilar and mediastinal contours are unremarkable.     Bones are intact.    8/4 CTA chest     1. No evidence of acute cardiopulmonary embolus.  2. Still a zone of increased density identified in the patient's posterior gutter on the left 12 mm in size may reflect a early infiltrate or suspicious pulmonary nodule formation.  On any matter, short-term interval follow-up within a 6 month interval to document stability imaging findings is advised based on the Fleischner criteria.  3. 6 mm cyst projecting in the posterior interpolar region of the left kidney.  4. Prominent distention of the esophagus with evidence of retention of fluid that may reflect findings as a manifestation of achalasia with scleroderma as there is of moderate tapering of the distal esophagus at the level of the gastroesophageal junction.       8/5 EKG Sinus tachycardia  Otherwise normal ECG  When compared with ECG of 15-OCT-2019 18:43,  Nonspecific T wave abnormality now evident in Anterior leads

## 2020-08-13 NOTE — PLAN OF CARE
Recommendation:   1. Consider probiotics to help with diarrhea  2. Please put tube feeding order in EPIC   3. Continue Isosource 1.5 Bolus Feeds @ 4 cans/day to provide 1500 kcals, 68 g pro, and 764 mL enteral fluid      A. Fluid flush of 160 mL q6hrs or per MD     Interventions:  Enteral nutrition  Collaboration with other providers     Goals: meet >85% of needs by discharge  Nutrition Goal Status: progressing towards goal  Nutrition Discharge Planning: nutrition through PEG

## 2020-08-13 NOTE — ASSESSMENT & PLAN NOTE
Not sure the cause of this.  In the last 2 months he has been innudated with antibiotics.  Cultures seem to always be negative.  Will get echo tomorrow.  Abx increased to cover mrsa. Addition of levaquin did not help with fevers, but wbc ct did improve. Will cont both for now.  Broaden work up.   If no obvious etiology will d/w id.  8/10 A little better with Vanc - no BM over night, afebrile this am   Echo pending,   8/11 Echo stable, now with recurrent diarrhea and fever, CT of abd and pelvis- if negative for source will need transfer for ID eval, GI eval   8/12 IV vanc change to PEG to Tx colitis demonstrated per CT scan, temp trending down finally   8/13 Tmax 99.4

## 2020-08-14 LAB
ALBUMIN SERPL BCP-MCNC: 2.4 G/DL (ref 3.5–5.2)
ALP SERPL-CCNC: 74 U/L (ref 55–135)
ALT SERPL W/O P-5'-P-CCNC: 17 U/L (ref 10–44)
ANION GAP SERPL CALC-SCNC: 9 MMOL/L (ref 8–16)
ANISOCYTOSIS BLD QL SMEAR: SLIGHT
AST SERPL-CCNC: 12 U/L (ref 10–40)
BACTERIA BLD CULT: NORMAL
BACTERIA BLD CULT: NORMAL
BASOPHILS # BLD AUTO: ABNORMAL K/UL (ref 0–0.2)
BASOPHILS NFR BLD: 0 % (ref 0–1.9)
BILIRUB SERPL-MCNC: 0.2 MG/DL (ref 0.1–1)
BUN SERPL-MCNC: 3 MG/DL (ref 6–20)
CALCIUM SERPL-MCNC: 7.9 MG/DL (ref 8.7–10.5)
CHLORIDE SERPL-SCNC: 116 MMOL/L (ref 95–110)
CO2 SERPL-SCNC: 14 MMOL/L (ref 23–29)
CREAT SERPL-MCNC: 0.7 MG/DL (ref 0.5–1.4)
DIFFERENTIAL METHOD: ABNORMAL
EOSINOPHIL # BLD AUTO: ABNORMAL K/UL (ref 0–0.5)
EOSINOPHIL NFR BLD: 5 % (ref 0–8)
ERYTHROCYTE [DISTWIDTH] IN BLOOD BY AUTOMATED COUNT: 14.8 % (ref 11.5–14.5)
EST. GFR  (AFRICAN AMERICAN): >60 ML/MIN/1.73 M^2
EST. GFR  (NON AFRICAN AMERICAN): >60 ML/MIN/1.73 M^2
GLUCOSE SERPL-MCNC: 100 MG/DL (ref 70–110)
HCT VFR BLD AUTO: 34 % (ref 40–54)
HGB BLD-MCNC: 11 G/DL (ref 14–18)
IMM GRANULOCYTES # BLD AUTO: ABNORMAL K/UL (ref 0–0.04)
IMM GRANULOCYTES NFR BLD AUTO: ABNORMAL % (ref 0–0.5)
LYMPHOCYTES # BLD AUTO: ABNORMAL K/UL (ref 1–4.8)
LYMPHOCYTES NFR BLD: 15 % (ref 18–48)
MCH RBC QN AUTO: 28.9 PG (ref 27–31)
MCHC RBC AUTO-ENTMCNC: 32.4 G/DL (ref 32–36)
MCV RBC AUTO: 90 FL (ref 82–98)
MONOCYTES # BLD AUTO: ABNORMAL K/UL (ref 0.3–1)
MONOCYTES NFR BLD: 3 % (ref 4–15)
NEUTROPHILS NFR BLD: 70 % (ref 38–73)
NEUTS BAND NFR BLD MANUAL: 7 %
NRBC BLD-RTO: 0 /100 WBC
PLATELET # BLD AUTO: 450 K/UL (ref 150–350)
PLATELET BLD QL SMEAR: ABNORMAL
PMV BLD AUTO: 9.7 FL (ref 9.2–12.9)
POCT GLUCOSE: 85 MG/DL (ref 70–110)
POCT GLUCOSE: 93 MG/DL (ref 70–110)
POCT GLUCOSE: 93 MG/DL (ref 70–110)
POCT GLUCOSE: 96 MG/DL (ref 70–110)
POTASSIUM SERPL-SCNC: 2.9 MMOL/L (ref 3.5–5.1)
PROT SERPL-MCNC: 5.9 G/DL (ref 6–8.4)
RBC # BLD AUTO: 3.8 M/UL (ref 4.6–6.2)
SODIUM SERPL-SCNC: 139 MMOL/L (ref 136–145)
WBC # BLD AUTO: 10.45 K/UL (ref 3.9–12.7)

## 2020-08-14 PROCEDURE — 25000003 PHARM REV CODE 250: Performed by: NURSE PRACTITIONER

## 2020-08-14 PROCEDURE — 11000001 HC ACUTE MED/SURG PRIVATE ROOM

## 2020-08-14 PROCEDURE — 25000003 PHARM REV CODE 250: Performed by: FAMILY MEDICINE

## 2020-08-14 PROCEDURE — 99233 PR SUBSEQUENT HOSPITAL CARE,LEVL III: ICD-10-PCS | Mod: ,,, | Performed by: INTERNAL MEDICINE

## 2020-08-14 PROCEDURE — 80053 COMPREHEN METABOLIC PANEL: CPT

## 2020-08-14 PROCEDURE — 94761 N-INVAS EAR/PLS OXIMETRY MLT: CPT

## 2020-08-14 PROCEDURE — 36415 COLL VENOUS BLD VENIPUNCTURE: CPT

## 2020-08-14 PROCEDURE — 85027 COMPLETE CBC AUTOMATED: CPT

## 2020-08-14 PROCEDURE — 25000003 PHARM REV CODE 250: Performed by: INTERNAL MEDICINE

## 2020-08-14 PROCEDURE — 85007 BL SMEAR W/DIFF WBC COUNT: CPT

## 2020-08-14 PROCEDURE — 99233 SBSQ HOSP IP/OBS HIGH 50: CPT | Mod: ,,, | Performed by: INTERNAL MEDICINE

## 2020-08-14 RX ORDER — POTASSIUM CHLORIDE 1.5 G/1.58G
20 POWDER, FOR SOLUTION ORAL ONCE
Status: COMPLETED | OUTPATIENT
Start: 2020-08-14 | End: 2020-08-14

## 2020-08-14 RX ORDER — POTASSIUM CHLORIDE 1.5 G/1.58G
40 POWDER, FOR SOLUTION ORAL ONCE
Status: COMPLETED | OUTPATIENT
Start: 2020-08-14 | End: 2020-08-14

## 2020-08-14 RX ORDER — L. ACIDOPHILUS/L.BULGARICUS 100MM CELL
1 GRANULES IN PACKET (EA) ORAL 2 TIMES DAILY
Status: DISCONTINUED | OUTPATIENT
Start: 2020-08-14 | End: 2020-08-15

## 2020-08-14 RX ADMIN — DEXTROSE, SODIUM CHLORIDE, AND POTASSIUM CHLORIDE: 5; .45; .15 INJECTION INTRAVENOUS at 09:08

## 2020-08-14 RX ADMIN — POTASSIUM CHLORIDE 20 MEQ: 1.5 POWDER, FOR SOLUTION ORAL at 11:08

## 2020-08-14 RX ADMIN — METRONIDAZOLE 500 MG: 500 TABLET ORAL at 06:08

## 2020-08-14 RX ADMIN — POTASSIUM CHLORIDE 40 MEQ: 1.5 POWDER, FOR SOLUTION ORAL at 09:08

## 2020-08-14 RX ADMIN — PROPRANOLOL HYDROCHLORIDE 20 MG: 20 TABLET ORAL at 09:08

## 2020-08-14 RX ADMIN — VANCOMYCIN HYDROCHLORIDE 125 MG: KIT at 11:08

## 2020-08-14 RX ADMIN — GABAPENTIN 250 MG: 250 SOLUTION ORAL at 09:08

## 2020-08-14 RX ADMIN — BACLOFEN 10 MG: 10 TABLET ORAL at 03:08

## 2020-08-14 RX ADMIN — VANCOMYCIN HYDROCHLORIDE 125 MG: KIT at 06:08

## 2020-08-14 RX ADMIN — DEXTROSE, SODIUM CHLORIDE, AND POTASSIUM CHLORIDE: 5; .45; .15 INJECTION INTRAVENOUS at 10:08

## 2020-08-14 RX ADMIN — BACLOFEN 10 MG: 10 TABLET ORAL at 09:08

## 2020-08-14 RX ADMIN — PHENOBARBITAL 100 MG: 20 ELIXIR ORAL at 09:08

## 2020-08-14 RX ADMIN — LACTOBACILLUS ACIDOPHILUS / LACTOBACILLUS BULGARICUS 1 EACH: 100 MILLION CFU STRENGTH GRANULES at 09:08

## 2020-08-14 RX ADMIN — METRONIDAZOLE 500 MG: 500 TABLET ORAL at 09:08

## 2020-08-14 RX ADMIN — METRONIDAZOLE 500 MG: 500 TABLET ORAL at 03:08

## 2020-08-14 RX ADMIN — PROPRANOLOL HYDROCHLORIDE 20 MG: 20 TABLET ORAL at 03:08

## 2020-08-14 RX ADMIN — POTASSIUM CHLORIDE 20 MEQ: 1.5 POWDER, FOR SOLUTION ORAL at 06:08

## 2020-08-14 RX ADMIN — LEVOFLOXACIN 500 MG: 500 TABLET, FILM COATED ORAL at 09:08

## 2020-08-14 NOTE — ASSESSMENT & PLAN NOTE
Cont home phenobarbital and baclofen.  Cont tube feeds per home routine via PEG  He is non-verbal and not mobile at baseline. He seems to be more interactive and grunting at TV today. Mom thinks this is his baseline

## 2020-08-14 NOTE — PLAN OF CARE
Patient seems to be resting much more comfortable than previous shifts. Still having mucoid/bloody diarrhea. Mom at bedside helping with patient care at times. New IV started in right wrist after accidentally removing old. PEG tube patent. Holding feedings at this time. Patient remaining afebrile, VS stable. IV fluids and oral antibiotics administered as ordered. No acute changes noted. Plan of care reviewed with mom and agreed upon.

## 2020-08-14 NOTE — ASSESSMENT & PLAN NOTE
Recently admitted to [place] with this diagnosis  Started on Vanc yesterday ? RLL pneumonia   Not really convinced of this. First xr with questionable right sided changes. Repeat cxr clear.  8/11 Day 4 levofloxacin, day 3 Vanc- continues with fever and diarrhea   8/12 Day 5 levoflxacin, afebrile; CT abd and pelvis yesterday noting Subtle ground-glass opacity in the right lung base which could represent a developing inflammatory/infectious process or sequela of aspiration.  8/13 Day 6 Levofloxacin 500mg per PEG for pneumonia - Right lung per CT     8/14     Day 7  Levofloxacin 500mg per PEG for pneumonia - Right lung per CT   DC today .

## 2020-08-14 NOTE — ASSESSMENT & PLAN NOTE
Replace IV and via PEG today  Labs in AM  Likely due to severe diarrhea  telemetry  K+ better 2.2>3.3 after 5 >> 10meq IV riders in ER and 20meq po   Will repeat g tube this am 40meq po once this am    8/6: resolved. Stop K in fluids today. Still having diarrhea so will keep 1 more day and repeat labs in morning to be sure remains stable as this was his main reason for admission  8/7 K+ 4.2 .    Resolved    8/9  Replace via G tube. Worsening with diarrhea  8/10 change KCL to powder for PEG tube   8/11 replace , due to  GI loss with significant diarrhea   8/12 continues with some diarrhea now with blood noted, K+ better 3.6 ; now getting D5 1/2 with KCL   8/13 K+ 3.4- replace   8/14 K+ 2.9- replace

## 2020-08-14 NOTE — SUBJECTIVE & OBJECTIVE
Review of Systems   Unable to perform ROS: Patient nonverbal   Gastrointestinal: Positive for diarrhea.     Objective:     Vital Signs (Most Recent):  Temp: 98.4 °F (36.9 °C) (08/14/20 0725)  Pulse: 78 (08/14/20 0725)  Resp: 20 (08/14/20 0725)  BP: 124/73 (08/14/20 0725)  SpO2: 99 % (08/14/20 0800) Vital Signs (24h Range):  Temp:  [98.2 °F (36.8 °C)-98.9 °F (37.2 °C)] 98.4 °F (36.9 °C)  Pulse:  [] 78  Resp:  [16-20] 20  SpO2:  [95 %-100 %] 99 %  BP: (107-124)/(61-75) 124/73     Weight: 35.4 kg (78 lb)  Body mass index is 16.88 kg/m².    Physical Exam  Vitals signs and nursing note reviewed.   Constitutional:       Appearance: He is well-developed.   HENT:      Head: Normocephalic and atraumatic.      Right Ear: External ear normal.      Left Ear: External ear normal.      Nose: Nose normal.   Eyes:      General:         Right eye: No discharge.         Left eye: No discharge.      Conjunctiva/sclera: Conjunctivae normal.      Pupils: Pupils are equal, round, and reactive to light.      Comments: Strabismus right eye   Neck:      Musculoskeletal: Neck supple.      Thyroid: No thyromegaly.   Cardiovascular:      Rate and Rhythm: Normal rate and regular rhythm.      Heart sounds: No murmur. No friction rub. No gallop.       Comments: Tachycardia, regular  Pulmonary:      Effort: Pulmonary effort is normal. No respiratory distress.      Breath sounds: Normal breath sounds. No wheezing or rales.   Abdominal:      General: Abdomen is flat. Bowel sounds are normal. There is no distension.      Palpations: Abdomen is soft.      Tenderness: There is no abdominal tenderness.      Comments: PEG in place with mild redness, no drainage    Foul smelling stool   Musculoskeletal:      Right lower leg: No edema.      Left lower leg: No edema.      Comments: Muscle atrophy  contractures   Lymphadenopathy:      Cervical: No cervical adenopathy.   Skin:     General: Skin is warm and dry.   Neurological:      Mental Status: He  is alert.      Comments: Patient alert but otherwise not responsive and can not cooperate in exam, severe CP   Psychiatric:      Comments: Uneasy in bed. Rotating around           CRANIAL NERVES     CN III, IV, VI   Pupils are equal, round, and reactive to light.               Significant Labs: covid screen negative-- repeat pending   CBC:   Recent Labs   Lab 08/13/20 0625 08/14/20  0633   WBC 11.23 10.45   HGB 11.5* 11.0*   HCT 36.3* 34.0*   * 450*     CMP:   Recent Labs   Lab 08/13/20 0625 08/14/20  0633    139   K 3.4* 2.9*   * 116*   CO2 13* 14*   GLU 86 100   BUN 3* 3*   CREATININE 0.6 0.7   CALCIUM 8.1* 7.9*   PROT 6.3 5.9*   ALBUMIN 2.5* 2.4*   BILITOT 0.2 0.2   ALKPHOS 75 74   AST 16 12   ALT 24 17   ANIONGAP 10 9   EGFRNONAA >60 >60     8/4 phenobarb 25.8 .    CRP 58.1    procal 0.21  Lactic acid 1.3    Urine Studies:   No results for input(s): COLORU, APPEARANCEUA, PHUR, SPECGRAV, PROTEINUA, GLUCUA, KETONESU, BILIRUBINUA, OCCULTUA, NITRITE, UROBILINOGEN, LEUKOCYTESUR, RBCUA, WBCUA, BACTERIA, SQUAMEPITHEL, HYALINECASTS in the last 48 hours.    Invalid input(s): WRIGHTSUR  c diff negative .      Blood cultures NGTD- in process x 2     Strep and flu negative     Significant Imaging:     CT abd/pelvis 8/11/2020   Imaging findings suggestive for a colitis, most probably involving the rectosigmoid colon with inflammatory and infectious etiologies to be primarily considered.  Ischemia is felt unlikely.  Pseudomembranous colitis would also be consideration.  There is a small amount of free fluid in the pelvis which is likely related to the inflammatory changes of the bowel.     Abnormal thickening of the walls of the urinary bladder which could suggest a cystitis.  Correlation with urinalysis recommended.     Subtle ground-glass opacity in the right lung base which could represent a developing inflammatory/infectious process or sequela of aspiration.        8/10/2020 [facility]  · Normal left  ventricular systolic function. The estimated ejection fraction is 60%.  · No wall motion abnormalities.  · Normal LV diastolic function.  · Normal right ventricular systolic function.    8/4 CXR The lungs are clear, with normal appearance of pulmonary vasculature and no pleural effusion or pneumothorax.     The cardiac silhouette is normal in size. The hilar and mediastinal contours are unremarkable.     Bones are intact.    8/4 CTA chest     1. No evidence of acute cardiopulmonary embolus.  2. Still a zone of increased density identified in the patient's posterior gutter on the left 12 mm in size may reflect a early infiltrate or suspicious pulmonary nodule formation.  On any matter, short-term interval follow-up within a 6 month interval to document stability imaging findings is advised based on the Fleischner criteria.  3. 6 mm cyst projecting in the posterior interpolar region of the left kidney.  4. Prominent distention of the esophagus with evidence of retention of fluid that may reflect findings as a manifestation of achalasia with scleroderma as there is of moderate tapering of the distal esophagus at the level of the gastroesophageal junction.       8/5 EKG Sinus tachycardia  Otherwise normal ECG  When compared with ECG of 15-OCT-2019 18:43,  Nonspecific T wave abnormality now evident in Anterior leads

## 2020-08-14 NOTE — PROGRESS NOTES
"[facility] Medicine  Progress Note    Patient Name: Glen Moscoso  MRN: [id number]  Patient Class: IP- Inpatient   Admission Date: 8/4/2020  Length of Stay: 9 days  Attending Physician: Anjelica Brandt MD  Primary Care Provider: Yadi Lawson MD        Subjective:     Principal Problem:Hypokalemia        HPI:  Patient presented to ER with fever and cough. Patient has cerebral palsy and is non-verbal. Mother provides history. He was seen at [facility] ER 2 weeks ago and diagnosed with bronchitis and strep throat. Treated with amoxil. He completed antibx 4 days ago. Mom reports loose watery stools 3-4x a day for almost 2 weeks now. She noted he had fever yesterday "He was burning up" but did not check with thermometer. She notes he is breathing a little heavier than normal. + cough. She does not think he is in any pain. No changes to tube feeds (Ensure 4x a day via PEG). No sick contacts or recent travel but was in [facility] ER and was hospitalized at [place] before that for "aspiration" and on antibx then. Patient has low grade temp 99.5. HR 120s. RR 30s. Sats 96% on RA. Rapid COVID negative however note mild elevation LFTs, LDH, ferritin pending. D-dimer elevated, CTA pending. Lactic acid and procalcitonin normal. Repeat COVID swab ordered. WBC 15K. UA not infectious. K 2.2--likely due to diarrhea. GFR > 60 (Cr 0.7 but very low muscle mass). CXR clear. Flu and strep negative. Admitted for treatment of hypokalemia and possible C. Diff vs COVID vs PE.     Overview/Hospital Course:  CTA done this am negative for PE. POX 96% RR 18. He is still tachycardiac. With low grade fever. And diarrhea persist. K+ is better after 5-10meq KCL riders and 20meq via g tube.     8/6: COVID negative. Still having diarrhea. Still tachycardia (rate 120s and regular); mom doesn't know if this is his baseline. RR back to normal. He is more alert and interactive with TV. K is normal. Stool studies still pending. C. Diff " negative.     8/7/2020 Blood Cx negative x 3d- but WBC trending up WBC 12.08> 14.45, will add Cipro , repeat CXR   Patient had 3 loose stools last shift/voiding per diaper. Stool was sent to lab for ova and parasites as ordered, c-diff negative    K+ 4.2   Getting IVFs @ 150ml/hr , HR Remains 114 sinus tach    8/8  Fever overnight. More agitated. HR up to 150s. Given propranolol and HR down to 100s. Less agitated. Slept okay. Still with foul smelling stool.    8/9  Switched from cipro to levaquin to cover for potential right lower lobe developing infiltrate. However, last night, he had higher fever. Discussed at length with mom that he has been on >10 rounds of different antibiotics over the last 2 months. Not always with a definitive diagnosis. Currently, he is still having foul smelling abnormally colored stools. He is non verbal and does not grimace with movement of his arms or legs or head. He also is unphased by abdominal exam.    8/10/2020 Tmax 101.9 , FUO, ? Infectious diarrhea tx ; cipro changed to Levofloxacin day 3 to cover for ? RLL pneumonia - but cxr not convincing,   Still getting flagyl. Switched to vanc and since, no fever, no diarrhea. Long discussion regarding ~11 abx in the past 2 months, has never been on Vancomycin  Did total body exam to rule out possible skin soft tissue etiology;   ? Echo pending;   BP stable, -116 , afebrile this am so some improvement with Vanc-   Resp infection panel  and flu swab negative, Blood Cx - NGTD x 2 d , repeat Covid negative, C- diff negative     8/11/2020 Tmax 103, 100.7 this am , multiple watery stools overnight, started yesterday afternoon and now has stage 2 buttock wound  Echo with normal EF60%, no mention of valvular abn but  Overall the study quality was poor. The study was difficult due to patient's uncooperativeness, clinical status and poor endocardial visualization.   He gets ensure plus feedings  at home and getting Isos[employer] here; could be  source of diarrhea ? But has fever also   Getting Vanc and flagyl ; day 3 vanc, day 4 levofloxacin/flagyl   Multiple stool negative for rotavirus, O&P negative   Abdomen is non tender but - Will CT abd and pelvis; if negative then will need transfer for ID/GI   This was discussed with mother     8/12 CT of abd pelivs yesterday suggestive of colitis and Subtle ground-glass opacity in the right lung base which could represent a developing inflammatory/infectious process or sequela of aspiration.   Started on Vanc per Peg tube, flagyl day 5 , Tmax 99.3 , did have some blood stools , tube feeding on hold , will resume in a few days  Now getting IV D5 1/2 with KCL 20meq   day 5  Levofloxacin for pneumonia ,   Turning q2 for buttocks with breakdown from recurrent diarrhea, moisture barrier applied   Noted to have low BP early this am; given 250ml IVF bolus , BP currently 118/70,  , o2 sat stable on RA    8/13 Day 2 of Vanc per peg , day 6 flagyl for colitis,  Day 6 Levofloxacin per PEG for Pneumonia  Tmax 99.4,  WBC finally trending down   Hold Peg feedings, getting IVFs , still having lots of diarrhea but getting better     8/14/20   Glen Moscoso is a 24 y.o. male  Who has cerebral palsy and is non-verbal.contractures ;s/p PEG tube .     Day 3 /10 Vanc per PEG, day 7/10 flagyl for colitis,  Day 7/ Levofloxacin per PEG for Pneumonia- will get CXR if improved can stop levofloxacin ( finished 7 days )  Now afebrile and normal WBC but, Still having mucoid/bloody diarrhea, K+ 3.6>3.4>2.9 ;   Getting D51/2 with KCL, Tube feedings on hold .    Add lactobacillus   Condition discussed with mother- possible d/c on Monday         Review of Systems   Unable to perform ROS: Patient nonverbal   Gastrointestinal: Positive for diarrhea.     Objective:     Vital Signs (Most Recent):  Temp: 98.4 °F (36.9 °C) (08/14/20 0725)  Pulse: 78 (08/14/20 0725)  Resp: 20 (08/14/20 0725)  BP: 124/73 (08/14/20 0725)  SpO2: 99 % (08/14/20 0800)  Vital Signs (24h Range):  Temp:  [98.2 °F (36.8 °C)-98.9 °F (37.2 °C)] 98.4 °F (36.9 °C)  Pulse:  [] 78  Resp:  [16-20] 20  SpO2:  [95 %-100 %] 99 %  BP: (107-124)/(61-75) 124/73     Weight: 35.4 kg (78 lb)  Body mass index is 16.88 kg/m².    Physical Exam  Vitals signs and nursing note reviewed.   Constitutional:       Appearance: He is well-developed.   HENT:      Head: Normocephalic and atraumatic.      Right Ear: External ear normal.      Left Ear: External ear normal.      Nose: Nose normal.   Eyes:      General:         Right eye: No discharge.         Left eye: No discharge.      Conjunctiva/sclera: Conjunctivae normal.      Pupils: Pupils are equal, round, and reactive to light.      Comments: Strabismus right eye   Neck:      Musculoskeletal: Neck supple.      Thyroid: No thyromegaly.   Cardiovascular:      Rate and Rhythm: Normal rate and regular rhythm.      Heart sounds: No murmur. No friction rub. No gallop.       Comments: Tachycardia, regular  Pulmonary:      Effort: Pulmonary effort is normal. No respiratory distress.      Breath sounds: Normal breath sounds. No wheezing or rales.   Abdominal:      General: Abdomen is flat. Bowel sounds are normal. There is no distension.      Palpations: Abdomen is soft.      Tenderness: There is no abdominal tenderness.      Comments: PEG in place with mild redness, no drainage    Foul smelling stool   Musculoskeletal:      Right lower leg: No edema.      Left lower leg: No edema.      Comments: Muscle atrophy  contractures   Lymphadenopathy:      Cervical: No cervical adenopathy.   Skin:     General: Skin is warm and dry.   Neurological:      Mental Status: He is alert.      Comments: Patient alert but otherwise not responsive and can not cooperate in exam, severe CP   Psychiatric:      Comments: Uneasy in bed. Rotating around           CRANIAL NERVES     CN III, IV, VI   Pupils are equal, round, and reactive to light.               Significant Labs: covid  screen negative-- repeat pending   CBC:   Recent Labs   Lab 08/13/20  0625 08/14/20  0633   WBC 11.23 10.45   HGB 11.5* 11.0*   HCT 36.3* 34.0*   * 450*     CMP:   Recent Labs   Lab 08/13/20  0625 08/14/20  0633    139   K 3.4* 2.9*   * 116*   CO2 13* 14*   GLU 86 100   BUN 3* 3*   CREATININE 0.6 0.7   CALCIUM 8.1* 7.9*   PROT 6.3 5.9*   ALBUMIN 2.5* 2.4*   BILITOT 0.2 0.2   ALKPHOS 75 74   AST 16 12   ALT 24 17   ANIONGAP 10 9   EGFRNONAA >60 >60     8/4 phenobarb 25.8 .    CRP 58.1    procal 0.21  Lactic acid 1.3    Urine Studies:   No results for input(s): COLORU, APPEARANCEUA, PHUR, SPECGRAV, PROTEINUA, GLUCUA, KETONESU, BILIRUBINUA, OCCULTUA, NITRITE, UROBILINOGEN, LEUKOCYTESUR, RBCUA, WBCUA, BACTERIA, SQUAMEPITHEL, HYALINECASTS in the last 48 hours.    Invalid input(s): WRIGHTSUR  c diff negative .      Blood cultures NGTD- in process x 2     Strep and flu negative     Significant Imaging:     CT abd/pelvis 8/11/2020   Imaging findings suggestive for a colitis, most probably involving the rectosigmoid colon with inflammatory and infectious etiologies to be primarily considered.  Ischemia is felt unlikely.  Pseudomembranous colitis would also be consideration.  There is a small amount of free fluid in the pelvis which is likely related to the inflammatory changes of the bowel.     Abnormal thickening of the walls of the urinary bladder which could suggest a cystitis.  Correlation with urinalysis recommended.     Subtle ground-glass opacity in the right lung base which could represent a developing inflammatory/infectious process or sequela of aspiration.        8/10/2020 [facility]  · Normal left ventricular systolic function. The estimated ejection fraction is 60%.  · No wall motion abnormalities.  · Normal LV diastolic function.  · Normal right ventricular systolic function.    8/4 CXR The lungs are clear, with normal appearance of pulmonary vasculature and no pleural effusion or  pneumothorax.     The cardiac silhouette is normal in size. The hilar and mediastinal contours are unremarkable.     Bones are intact.    8/4 CTA chest     1. No evidence of acute cardiopulmonary embolus.  2. Still a zone of increased density identified in the patient's posterior gutter on the left 12 mm in size may reflect a early infiltrate or suspicious pulmonary nodule formation.  On any matter, short-term interval follow-up within a 6 month interval to document stability imaging findings is advised based on the Fleischner criteria.  3. 6 mm cyst projecting in the posterior interpolar region of the left kidney.  4. Prominent distention of the esophagus with evidence of retention of fluid that may reflect findings as a manifestation of achalasia with scleroderma as there is of moderate tapering of the distal esophagus at the level of the gastroesophageal junction.       8/5 EKG Sinus tachycardia  Otherwise normal ECG  When compared with ECG of 15-OCT-2019 18:43,  Nonspecific T wave abnormality now evident in Anterior leads      Assessment/Plan:      * Hypokalemia  Replace IV and via PEG today  Labs in AM  Likely due to severe diarrhea  telemetry  K+ better 2.2>3.3 after 5 >> 10meq IV riders in ER and 20meq po   Will repeat g tube this am 40meq po once this am    8/6: resolved. Stop K in fluids today. Still having diarrhea so will keep 1 more day and repeat labs in morning to be sure remains stable as this was his main reason for admission  8/7 K+ 4.2 .    Resolved    8/9  Replace via G tube. Worsening with diarrhea  8/10 change KCL to powder for PEG tube   8/11 replace , due to  GI loss with significant diarrhea   8/12 continues with some diarrhea now with blood noted, K+ better 3.6 ; now getting D5 1/2 with KCL   8/13 K+ 3.4- replace   8/14 K+ 2.9- replace     Decubitus ulcer of left buttock, stage 2    Moisture barrier oitment  Offload, turning q 2 .    Fever in adult  Not sure the cause of this.  In the last 2  months he has been innudated with antibiotics.  Cultures seem to always be negative.  Will get echo tomorrow.  Abx increased to cover mrsa. Addition of levaquin did not help with fevers, but wbc ct did improve. Will cont both for now.  Broaden work up.   If no obvious etiology will d/w id.  8/10 A little better with Vanc - no BM over night, afebrile this am   Echo pending,   8/11 Echo stable, now with recurrent diarrhea and fever, CT of abd and pelvis- if negative for source will need transfer for ID eval, GI eval   8/12 IV vanc change to PEG to Tx colitis demonstrated per CT scan, temp trending down finally   8/13 Tmax 99.4   8/14 AFEBRILE x 24 hrs   DC levaquin     Aspiration pneumonitis  Recently admitted to [place] with this diagnosis  Started on Vanc yesterday ? RLL pneumonia   Not really convinced of this. First xr with questionable right sided changes. Repeat cxr clear.  8/11 Day 4 levofloxacin, day 3 Vanc- continues with fever and diarrhea   8/12 Day 5 levoflxacin, afebrile; CT abd and pelvis yesterday noting Subtle ground-glass opacity in the right lung base which could represent a developing inflammatory/infectious process or sequela of aspiration.  8/13 Day 6 Levofloxacin 500mg per PEG for pneumonia - Right lung per CT     8/14     Day 7  Levofloxacin 500mg per PEG for pneumonia - Right lung per CT   DC today .    Leukocytosis  Differential broad  Low grade fevers  C. Diff possible  CXR clear  UA clear  Aspiration in differential    Ruled out PE with CTA  Hold antibx for now as lactic acid and procal is normal but low threshold to start if any decompensation overnight     8/6: resolved without antibx treatment. Could be dehydration.     8/7  Slight bump again today. Start cipro flagyl and check cxr again.    8/9  Wbc ct improved today. But fevers still very high. Start on vanc (d/c levaquin) with recent hospital admission. Monitor fever curve.  8/10 WBC 9.42> 13.68, Resp infection panel  and flu swab  negative, Blood Cx - NGTD x 2 d   vanc started - afebrile this am,   8/11 WBC 13.68>12.59 , but now having recurrent diarrhea and fever   8/12 WBC 12.59> 12.79, expect to improve with Tx for colitis    8/13 WBC 11.23 trending down finally   Lab Results   Component Value Date    WBC 10.45 08/14/2020    HGB 11.0 (L) 08/14/2020    HCT 34.0 (L) 08/14/2020    MCV 90 08/14/2020     (H) 08/14/2020         RESOLVED     Diarrhea of presumed infectious origin  Antibiotic associate diarrhea vs true C. Diff  C. Diff pending  If worsening clinical status will start PO Vanc for treatment; holding tonight as true clinical picture not yet clear    8/6:  Check stool studies--pending. C. Diff negative  Change NS 150cc/hr  8/7 Blood Cx negative, afebrile but WBC trending up; will add Cipro + flagyl and cont for 7 days  Stool for Ova/parasites   8/12 CT abd suggestive of colitis IV vanc changed to PER PEG , continue flagyl day 5/7 , Tmax 99.3 overnight     8/8  Stool studies all negative. Started on flagyl anyway. Will give him probiotics as well.    8/9  Add in rotavirus panel today. Continued on flagyl.    8/10  Maybe this is an occult c diff? Interestingly it improved with addition of IV vanc?    8/11  Day 4 levofloxacin, day 3 Vanc- continues with fever and diarrhea started up again , will need transfer for ID, GI    8/12 This is C diff colitis clinically although C diff test is negative.  Colitis on CT and he is responding to first 24 hours of Vanc per gastrostomy. No fever in over 24 hours. Will keep NPG until diarrhea improves as it is causing perianal skin breakdown. D5 1/2 NS with KCL for maintenance fluids    8/13 + Colitis, may have been C- diff despite negative stools, Has has multiple ABX over the past 2 months  Continue Vanc per PEG - day2 , day 6 flagyl. I would not resume PEG feeds until diarrhea clears up as it is causing skin breakdown     8/14 Vanc per PEG day 3, Day 7 flagyl, add  lactobacillus.    Tachypnea  Repeat COVID ordered; rapid negative but + cough, diarrhea, mild LFT elevation  CXR without infiltrates  D-dimer elevated so checking CTA for PE tonight. Will start heparin gtt if CTA positive  No antibx for now with normal procal but aspiration in differential as well so could add Zosyn if needed  8/5: POX 96% on RA and RR down to 18/min. Maybe dehydration/hypoK played a role here    8/6: resolved. COVID negative. RR 18 from 30-40 on admit.   8/7 remains with - check EKG verify sinus tach     8/8  CXR looks like he has a developing infiltrate. Levaquin started today. Has fever, increased wbc ct and now infiltrate. Reorder blood culture.    8/9  Consulted pulm. Will get echo tomorrow and consult cards.   Resolved .    Sinus tachycardia  NS 10cc/hr  Likely some dehydration from frequent diarrhea (very low muscle mass so I trust Cr less)  D-dimer elevated so assessing for PE as well  CTA negative PE    Mom unsure his baseline heart rate.     Started on propranolol yesterday    8/9  With continued tachycardia and fever. Given single 500 cc bolus last night. Remains tachycardic. Worse with fevers    8/10  Improved with propranolol and vanc.  8/11 Reviewed old progress notes ; BP documented but not HR- not sure of pts baseline   8/13 Improving with IVF and treatment of presumed cdiff colitis     8/14 RESOLVED- HR 80s now that he is afebrile with normal WBC .    Cerebral palsy  Cont home phenobarbital and baclofen.  Cont tube feeds per home routine via PEG  He is non-verbal and not mobile at baseline. He seems to be more interactive and grunting at TV today. Mom thinks this is his baseline        VTE Risk Mitigation (From admission, onward)         Ordered     IP VTE LOW RISK PATIENT  Once      08/04/20 9569                Discharge Planning   ROCÍO:      Code Status: Full Code   Is the patient medically ready for discharge?:     Reason for patient still in hospital (select all that  apply): Treatment  Discharge Plan A: Home Health                  Marianela Lugo MD  Department of Hospital Medicine   [facility]

## 2020-08-14 NOTE — ASSESSMENT & PLAN NOTE
Differential broad  Low grade fevers  C. Diff possible  CXR clear  UA clear  Aspiration in differential    Ruled out PE with CTA  Hold antibx for now as lactic acid and procal is normal but low threshold to start if any decompensation overnight     8/6: resolved without antibx treatment. Could be dehydration.     8/7  Slight bump again today. Start cipro flagyl and check cxr again.    8/9  Wbc ct improved today. But fevers still very high. Start on vanc (d/c levaquin) with recent hospital admission. Monitor fever curve.  8/10 WBC 9.42> 13.68, Resp infection panel  and flu swab negative, Blood Cx - NGTD x 2 d   vanc started - afebrile this am,   8/11 WBC 13.68>12.59 , but now having recurrent diarrhea and fever   8/12 WBC 12.59> 12.79, expect to improve with Tx for colitis    8/13 WBC 11.23 trending down finally   Lab Results   Component Value Date    WBC 10.45 08/14/2020    HGB 11.0 (L) 08/14/2020    HCT 34.0 (L) 08/14/2020    MCV 90 08/14/2020     (H) 08/14/2020         RESOLVED

## 2020-08-14 NOTE — ASSESSMENT & PLAN NOTE
Antibiotic associate diarrhea vs true C. Diff  C. Diff pending  If worsening clinical status will start PO Vanc for treatment; holding tonight as true clinical picture not yet clear    8/6:  Check stool studies--pending. C. Diff negative  Change NS 150cc/hr  8/7 Blood Cx negative, afebrile but WBC trending up; will add Cipro + flagyl and cont for 7 days  Stool for Ova/parasites   8/12 CT abd suggestive of colitis IV vanc changed to PER PEG , continue flagyl day 5/7 , Tmax 99.3 overnight     8/8  Stool studies all negative. Started on flagyl anyway. Will give him probiotics as well.    8/9  Add in rotavirus panel today. Continued on flagyl.    8/10  Maybe this is an occult c diff? Interestingly it improved with addition of IV vanc?    8/11  Day 4 levofloxacin, day 3 Vanc- continues with fever and diarrhea started up again , will need transfer for ID, GI    8/12 This is C diff colitis clinically although C diff test is negative.  Colitis on CT and he is responding to first 24 hours of Vanc per gastrostomy. No fever in over 24 hours. Will keep NPG until diarrhea improves as it is causing perianal skin breakdown. D5 1/2 NS with KCL for maintenance fluids    8/13 + Colitis, may have been C- diff despite negative stools, Has has multiple ABX over the past 2 months  Continue Vanc per PEG - day2 , day 6 flagyl. I would not resume PEG feeds until diarrhea clears up as it is causing skin breakdown     8/14 Vanc per PEG day 3, Day 7 flagyl, add lactobacillus.

## 2020-08-14 NOTE — PROGRESS NOTES
[facility]  Pulmonology  Progress Note    Patient Name: Glen Moscoso  MRN: [id number]  Admission Date: 8/4/2020  Hospital Length of Stay: 9 days  Code Status: Full Code  Attending Provider: Anjelica Brandt MD  Primary Care Provider: Yadi Lawson MD   Principal Problem: Hypokalemia    Subjective:     Interval History: Oral vanc has slowed diarrhea probably c diff . No obvious aspiration will check a cxr     Objective:     Vital Signs (Most Recent):  Temp: 98.2 °F (36.8 °C) (08/14/20 0423)  Pulse: 88 (08/14/20 0600)  Resp: 20 (08/14/20 0423)  BP: 107/61 (08/14/20 0423)  SpO2: 95 % (08/14/20 0423) Vital Signs (24h Range):  Temp:  [98.2 °F (36.8 °C)-98.9 °F (37.2 °C)] 98.2 °F (36.8 °C)  Pulse:  [] 88  Resp:  [16-20] 20  SpO2:  [95 %-100 %] 95 %  BP: (107-120)/(61-75) 107/61     Weight: 35.4 kg (78 lb)  Body mass index is 16.88 kg/m².      Intake/Output Summary (Last 24 hours) at 8/14/2020 0741  Last data filed at 8/13/2020 2314  Gross per 24 hour   Intake 450 ml   Output --   Net 450 ml       Physical Exam  Vitals signs and nursing note reviewed.   Constitutional:       General: He is not in acute distress.     Appearance: Normal appearance. He is well-developed. He is not ill-appearing, toxic-appearing or diaphoretic.   HENT:      Head: Normocephalic and atraumatic.      Jaw: No trismus.      Right Ear: Hearing, tympanic membrane, ear canal and external ear normal.      Left Ear: Hearing, tympanic membrane, ear canal and external ear normal.      Nose: Nose normal. No nasal deformity, mucosal edema or rhinorrhea.      Right Sinus: No maxillary sinus tenderness or frontal sinus tenderness.      Left Sinus: No maxillary sinus tenderness or frontal sinus tenderness.      Mouth/Throat:      Dentition: Normal dentition.      Pharynx: Uvula midline. No posterior oropharyngeal erythema or uvula swelling.   Eyes:      General: Lids are normal. No scleral icterus.     Conjunctiva/sclera: Conjunctivae  normal.      Comments: Sclera clear bilat   Neck:      Musculoskeletal: Full passive range of motion without pain and neck supple.      Trachea: Trachea and phonation normal.   Cardiovascular:      Rate and Rhythm: Normal rate and regular rhythm.      Pulses: Normal pulses.      Heart sounds: Normal heart sounds.   Pulmonary:      Effort: Respiratory distress (mild to moderate) present.      Breath sounds: Examination of the right-middle field reveals rhonchi. Examination of the left-middle field reveals rhonchi. Examination of the right-lower field reveals rales. Examination of the left-lower field reveals rales. Rhonchi and rales present. No decreased breath sounds or wheezing.   Abdominal:      General: Bowel sounds are normal. There is no distension.      Palpations: Abdomen is soft.      Tenderness: There is no abdominal tenderness.   Musculoskeletal: Normal range of motion.         General: No deformity.   Skin:     General: Skin is warm and dry.      Coloration: Skin is not pale.   Neurological:      Mental Status: He is alert and oriented to person, place, and time.      Motor: No abnormal muscle tone.      Coordination: Coordination normal.   Psychiatric:         Speech: Speech normal.         Behavior: Behavior normal. Behavior is cooperative.         Thought Content: Thought content normal.         Judgment: Judgment normal.         Vents:       Lines/Drains/Airways     Drain                 Gastrostomy/Enterostomy 08/04/20 1653 feeding 9 days          Peripheral Intravenous Line                 Peripheral IV - Single Lumen 08/14/20 0358 20 G Anterior;Right Forearm less than 1 day                Significant Labs:    CBC/Anemia Profile:  Recent Labs   Lab 08/13/20  0625 08/14/20  0633   WBC 11.23 10.45   HGB 11.5* 11.0*   HCT 36.3* 34.0*   * 450*   MCV 91 90   RDW 15.0* 14.8*        Chemistries:  Recent Labs   Lab 08/13/20  0625 08/14/20  0633    139   K 3.4* 2.9*   * 116*   CO2 13* 14*    BUN 3* 3*   CREATININE 0.6 0.7   CALCIUM 8.1* 7.9*   ALBUMIN 2.5* 2.4*   PROT 6.3 5.9*   BILITOT 0.2 0.2   ALKPHOS 75 74   ALT 24 17   AST 16 12       All pertinent labs within the past 24 hours have been reviewed.    Significant Imaging:  I have reviewed all pertinent imaging results/findings within the past 24 hours.    Assessment/Plan:     * Hypokalemia  Secondary to diarrhea     Aspiration pneumonitis  History in the past     Leukocytosis  stable    Diarrhea of presumed infectious origin  Diarrhea for 2 weeks watery     Sinus tachycardia  Mild dehydration     Cerebral palsy  Non communicating            Kar Moares MD  Pulmonology  [facility]

## 2020-08-14 NOTE — ASSESSMENT & PLAN NOTE
Repeat COVID ordered; rapid negative but + cough, diarrhea, mild LFT elevation  CXR without infiltrates  D-dimer elevated so checking CTA for PE tonight. Will start heparin gtt if CTA positive  No antibx for now with normal procal but aspiration in differential as well so could add Zosyn if needed  8/5: POX 96% on RA and RR down to 18/min. Maybe dehydration/hypoK played a role here    8/6: resolved. COVID negative. RR 18 from 30-40 on admit.   8/7 remains with - check EKG verify sinus tach     8/8  CXR looks like he has a developing infiltrate. Levaquin started today. Has fever, increased wbc ct and now infiltrate. Reorder blood culture.    8/9  Consulted pulm. Will get echo tomorrow and consult cards.   Resolved .

## 2020-08-14 NOTE — ASSESSMENT & PLAN NOTE
NS 10cc/hr  Likely some dehydration from frequent diarrhea (very low muscle mass so I trust Cr less)  D-dimer elevated so assessing for PE as well  CTA negative PE    Mom unsure his baseline heart rate.     Started on propranolol yesterday    8/9  With continued tachycardia and fever. Given single 500 cc bolus last night. Remains tachycardic. Worse with fevers    8/10  Improved with propranolol and vanc.  8/11 Reviewed old progress notes ; BP documented but not HR- not sure of pts baseline   8/13 Improving with IVF and treatment of presumed cdiff colitis     8/14 RESOLVED- HR 80s now that he is afebrile with normal WBC .

## 2020-08-14 NOTE — SUBJECTIVE & OBJECTIVE
Interval History: Oral vanc has slowed diarrhea probably c diff . No obvious aspiration will check a cxr     Objective:     Vital Signs (Most Recent):  Temp: 98.2 °F (36.8 °C) (08/14/20 0423)  Pulse: 88 (08/14/20 0600)  Resp: 20 (08/14/20 0423)  BP: 107/61 (08/14/20 0423)  SpO2: 95 % (08/14/20 0423) Vital Signs (24h Range):  Temp:  [98.2 °F (36.8 °C)-98.9 °F (37.2 °C)] 98.2 °F (36.8 °C)  Pulse:  [] 88  Resp:  [16-20] 20  SpO2:  [95 %-100 %] 95 %  BP: (107-120)/(61-75) 107/61     Weight: 35.4 kg (78 lb)  Body mass index is 16.88 kg/m².      Intake/Output Summary (Last 24 hours) at 8/14/2020 0741  Last data filed at 8/13/2020 2314  Gross per 24 hour   Intake 450 ml   Output --   Net 450 ml       Physical Exam  Vitals signs and nursing note reviewed.   Constitutional:       General: He is not in acute distress.     Appearance: Normal appearance. He is well-developed. He is not ill-appearing, toxic-appearing or diaphoretic.   HENT:      Head: Normocephalic and atraumatic.      Jaw: No trismus.      Right Ear: Hearing, tympanic membrane, ear canal and external ear normal.      Left Ear: Hearing, tympanic membrane, ear canal and external ear normal.      Nose: Nose normal. No nasal deformity, mucosal edema or rhinorrhea.      Right Sinus: No maxillary sinus tenderness or frontal sinus tenderness.      Left Sinus: No maxillary sinus tenderness or frontal sinus tenderness.      Mouth/Throat:      Dentition: Normal dentition.      Pharynx: Uvula midline. No posterior oropharyngeal erythema or uvula swelling.   Eyes:      General: Lids are normal. No scleral icterus.     Conjunctiva/sclera: Conjunctivae normal.      Comments: Sclera clear bilat   Neck:      Musculoskeletal: Full passive range of motion without pain and neck supple.      Trachea: Trachea and phonation normal.   Cardiovascular:      Rate and Rhythm: Normal rate and regular rhythm.      Pulses: Normal pulses.      Heart sounds: Normal heart sounds.    Pulmonary:      Effort: Respiratory distress (mild to moderate) present.      Breath sounds: Examination of the right-middle field reveals rhonchi. Examination of the left-middle field reveals rhonchi. Examination of the right-lower field reveals rales. Examination of the left-lower field reveals rales. Rhonchi and rales present. No decreased breath sounds or wheezing.   Abdominal:      General: Bowel sounds are normal. There is no distension.      Palpations: Abdomen is soft.      Tenderness: There is no abdominal tenderness.   Musculoskeletal: Normal range of motion.         General: No deformity.   Skin:     General: Skin is warm and dry.      Coloration: Skin is not pale.   Neurological:      Mental Status: He is alert and oriented to person, place, and time.      Motor: No abnormal muscle tone.      Coordination: Coordination normal.   Psychiatric:         Speech: Speech normal.         Behavior: Behavior normal. Behavior is cooperative.         Thought Content: Thought content normal.         Judgment: Judgment normal.         Vents:       Lines/Drains/Airways     Drain                 Gastrostomy/Enterostomy 08/04/20 1653 feeding 9 days          Peripheral Intravenous Line                 Peripheral IV - Single Lumen 08/14/20 0358 20 G Anterior;Right Forearm less than 1 day                Significant Labs:    CBC/Anemia Profile:  Recent Labs   Lab 08/13/20  0625 08/14/20  0633   WBC 11.23 10.45   HGB 11.5* 11.0*   HCT 36.3* 34.0*   * 450*   MCV 91 90   RDW 15.0* 14.8*        Chemistries:  Recent Labs   Lab 08/13/20  0625 08/14/20  0633    139   K 3.4* 2.9*   * 116*   CO2 13* 14*   BUN 3* 3*   CREATININE 0.6 0.7   CALCIUM 8.1* 7.9*   ALBUMIN 2.5* 2.4*   PROT 6.3 5.9*   BILITOT 0.2 0.2   ALKPHOS 75 74   ALT 24 17   AST 16 12       All pertinent labs within the past 24 hours have been reviewed.    Significant Imaging:  I have reviewed all pertinent imaging results/findings within the past 24  hours.

## 2020-08-14 NOTE — ASSESSMENT & PLAN NOTE
Not sure the cause of this.  In the last 2 months he has been innudated with antibiotics.  Cultures seem to always be negative.  Will get echo tomorrow.  Abx increased to cover mrsa. Addition of levaquin did not help with fevers, but wbc ct did improve. Will cont both for now.  Broaden work up.   If no obvious etiology will d/w id.  8/10 A little better with Vanc - no BM over night, afebrile this am   Echo pending,   8/11 Echo stable, now with recurrent diarrhea and fever, CT of abd and pelvis- if negative for source will need transfer for ID eval, GI eval   8/12 IV vanc change to PEG to Tx colitis demonstrated per CT scan, temp trending down finally   8/13 Tmax 99.4   8/14 AFEBRILE x 24 hrs   DC levaquin

## 2020-08-15 LAB
ALBUMIN SERPL BCP-MCNC: 2.5 G/DL (ref 3.5–5.2)
ALP SERPL-CCNC: 75 U/L (ref 55–135)
ALT SERPL W/O P-5'-P-CCNC: 15 U/L (ref 10–44)
ANION GAP SERPL CALC-SCNC: 5 MMOL/L (ref 8–16)
ANISOCYTOSIS BLD QL SMEAR: SLIGHT
AST SERPL-CCNC: 15 U/L (ref 10–40)
BASOPHILS # BLD AUTO: ABNORMAL K/UL (ref 0–0.2)
BASOPHILS NFR BLD: 0 % (ref 0–1.9)
BILIRUB SERPL-MCNC: 0.2 MG/DL (ref 0.1–1)
BUN SERPL-MCNC: 2 MG/DL (ref 6–20)
CALCIUM SERPL-MCNC: 8.2 MG/DL (ref 8.7–10.5)
CHLORIDE SERPL-SCNC: 114 MMOL/L (ref 95–110)
CO2 SERPL-SCNC: 16 MMOL/L (ref 23–29)
CREAT SERPL-MCNC: 0.7 MG/DL (ref 0.5–1.4)
DIFFERENTIAL METHOD: ABNORMAL
EOSINOPHIL # BLD AUTO: ABNORMAL K/UL (ref 0–0.5)
EOSINOPHIL NFR BLD: 7 % (ref 0–8)
ERYTHROCYTE [DISTWIDTH] IN BLOOD BY AUTOMATED COUNT: 15 % (ref 11.5–14.5)
EST. GFR  (AFRICAN AMERICAN): >60 ML/MIN/1.73 M^2
EST. GFR  (NON AFRICAN AMERICAN): >60 ML/MIN/1.73 M^2
GLUCOSE SERPL-MCNC: 95 MG/DL (ref 70–110)
HCT VFR BLD AUTO: 36.1 % (ref 40–54)
HGB BLD-MCNC: 11.7 G/DL (ref 14–18)
IMM GRANULOCYTES # BLD AUTO: ABNORMAL K/UL (ref 0–0.04)
IMM GRANULOCYTES NFR BLD AUTO: ABNORMAL % (ref 0–0.5)
LYMPHOCYTES # BLD AUTO: ABNORMAL K/UL (ref 1–4.8)
LYMPHOCYTES NFR BLD: 14 % (ref 18–48)
MCH RBC QN AUTO: 29.1 PG (ref 27–31)
MCHC RBC AUTO-ENTMCNC: 32.4 G/DL (ref 32–36)
MCV RBC AUTO: 90 FL (ref 82–98)
MONOCYTES # BLD AUTO: ABNORMAL K/UL (ref 0.3–1)
MONOCYTES NFR BLD: 6 % (ref 4–15)
NEUTROPHILS NFR BLD: 47 % (ref 38–73)
NEUTS BAND NFR BLD MANUAL: 26 %
NRBC BLD-RTO: 0 /100 WBC
PLATELET # BLD AUTO: 503 K/UL (ref 150–350)
PLATELET BLD QL SMEAR: ABNORMAL
PMV BLD AUTO: 9.8 FL (ref 9.2–12.9)
POCT GLUCOSE: 86 MG/DL (ref 70–110)
POCT GLUCOSE: 88 MG/DL (ref 70–110)
POCT GLUCOSE: 95 MG/DL (ref 70–110)
POCT GLUCOSE: 96 MG/DL (ref 70–110)
POTASSIUM SERPL-SCNC: 4.8 MMOL/L (ref 3.5–5.1)
PROT SERPL-MCNC: 6.2 G/DL (ref 6–8.4)
RBC # BLD AUTO: 4.02 M/UL (ref 4.6–6.2)
SODIUM SERPL-SCNC: 135 MMOL/L (ref 136–145)
WBC # BLD AUTO: 12.29 K/UL (ref 3.9–12.7)

## 2020-08-15 PROCEDURE — 11000001 HC ACUTE MED/SURG PRIVATE ROOM

## 2020-08-15 PROCEDURE — 85007 BL SMEAR W/DIFF WBC COUNT: CPT

## 2020-08-15 PROCEDURE — 99233 PR SUBSEQUENT HOSPITAL CARE,LEVL III: ICD-10-PCS | Mod: ,,, | Performed by: INTERNAL MEDICINE

## 2020-08-15 PROCEDURE — 25000003 PHARM REV CODE 250: Performed by: FAMILY MEDICINE

## 2020-08-15 PROCEDURE — 25000003 PHARM REV CODE 250: Performed by: INTERNAL MEDICINE

## 2020-08-15 PROCEDURE — 94761 N-INVAS EAR/PLS OXIMETRY MLT: CPT

## 2020-08-15 PROCEDURE — 99233 SBSQ HOSP IP/OBS HIGH 50: CPT | Mod: ,,, | Performed by: INTERNAL MEDICINE

## 2020-08-15 PROCEDURE — 80053 COMPREHEN METABOLIC PANEL: CPT

## 2020-08-15 PROCEDURE — 25000003 PHARM REV CODE 250: Performed by: NURSE PRACTITIONER

## 2020-08-15 PROCEDURE — 85027 COMPLETE CBC AUTOMATED: CPT

## 2020-08-15 PROCEDURE — 36415 COLL VENOUS BLD VENIPUNCTURE: CPT

## 2020-08-15 RX ORDER — TRIAMCINOLONE ACETONIDE 1 MG/G
CREAM TOPICAL 2 TIMES DAILY
Status: DISCONTINUED | OUTPATIENT
Start: 2020-08-15 | End: 2020-08-18 | Stop reason: HOSPADM

## 2020-08-15 RX ORDER — CHOLESTYRAMINE 4 G/9G
1 POWDER, FOR SUSPENSION ORAL 2 TIMES DAILY
Status: DISCONTINUED | OUTPATIENT
Start: 2020-08-16 | End: 2020-08-18 | Stop reason: HOSPADM

## 2020-08-15 RX ADMIN — METRONIDAZOLE 500 MG: 500 TABLET ORAL at 05:08

## 2020-08-15 RX ADMIN — VANCOMYCIN HYDROCHLORIDE 125 MG: KIT at 01:08

## 2020-08-15 RX ADMIN — LACTOBACILLUS ACIDOPHILUS / LACTOBACILLUS BULGARICUS 1 EACH: 100 MILLION CFU STRENGTH GRANULES at 09:08

## 2020-08-15 RX ADMIN — GABAPENTIN 250 MG: 250 SOLUTION ORAL at 09:08

## 2020-08-15 RX ADMIN — DEXTROSE, SODIUM CHLORIDE, AND POTASSIUM CHLORIDE: 5; .45; .15 INJECTION INTRAVENOUS at 03:08

## 2020-08-15 RX ADMIN — METRONIDAZOLE 500 MG: 500 TABLET ORAL at 03:08

## 2020-08-15 RX ADMIN — BACLOFEN 10 MG: 10 TABLET ORAL at 09:08

## 2020-08-15 RX ADMIN — METRONIDAZOLE 500 MG: 500 TABLET ORAL at 09:08

## 2020-08-15 RX ADMIN — VANCOMYCIN HYDROCHLORIDE 125 MG: KIT at 05:08

## 2020-08-15 RX ADMIN — VANCOMYCIN HYDROCHLORIDE 125 MG: KIT at 12:08

## 2020-08-15 RX ADMIN — TRIAMCINOLONE ACETONIDE: 1 CREAM TOPICAL at 12:08

## 2020-08-15 RX ADMIN — TRIAMCINOLONE ACETONIDE: 1 CREAM TOPICAL at 09:08

## 2020-08-15 RX ADMIN — BACLOFEN 10 MG: 10 TABLET ORAL at 03:08

## 2020-08-15 RX ADMIN — PROPRANOLOL HYDROCHLORIDE 20 MG: 20 TABLET ORAL at 09:08

## 2020-08-15 RX ADMIN — PHENOBARBITAL 100 MG: 20 ELIXIR ORAL at 09:08

## 2020-08-15 RX ADMIN — PROPRANOLOL HYDROCHLORIDE 20 MG: 20 TABLET ORAL at 03:08

## 2020-08-15 NOTE — PLAN OF CARE
Problem: Fall Injury Risk  Goal: Absence of Fall and Fall-Related Injury  Outcome: Ongoing, Progressing     Problem: Infection  Goal: Infection Symptom Resolution  Outcome: Ongoing, Progressing     Problem: Skin Injury Risk Increased  Goal: Skin Health and Integrity  Outcome: Ongoing, Progressing     Problem: Wound  Goal: Optimal Wound Healing  Outcome: Ongoing, Progressing  Remains alert to stimulation, mother at bedside. Several loose small greenish in color stools this shift. Attempts made to turn q2hr. NSR on the monitor.

## 2020-08-15 NOTE — PROGRESS NOTES
"[facility] Medicine  Progress Note    Patient Name: Glen Moscoso  MRN: [id number]  Patient Class: IP- Inpatient   Admission Date: 8/4/2020  Length of Stay: 10 days  Attending Physician: Anjelica Brandt MD  Primary Care Provider: Yadi Lawson MD        Subjective:     Principal Problem:Hypokalemia        HPI:  Patient presented to ER with fever and cough. Patient has cerebral palsy and is non-verbal. Mother provides history. He was seen at [facility] ER 2 weeks ago and diagnosed with bronchitis and strep throat. Treated with amoxil. He completed antibx 4 days ago. Mom reports loose watery stools 3-4x a day for almost 2 weeks now. She noted he had fever yesterday "He was burning up" but did not check with thermometer. She notes he is breathing a little heavier than normal. + cough. She does not think he is in any pain. No changes to tube feeds (Ensure 4x a day via PEG). No sick contacts or recent travel but was in [facility] ER and was hospitalized at [place] before that for "aspiration" and on antibx then. Patient has low grade temp 99.5. HR 120s. RR 30s. Sats 96% on RA. Rapid COVID negative however note mild elevation LFTs, LDH, ferritin pending. D-dimer elevated, CTA pending. Lactic acid and procalcitonin normal. Repeat COVID swab ordered. WBC 15K. UA not infectious. K 2.2--likely due to diarrhea. GFR > 60 (Cr 0.7 but very low muscle mass). CXR clear. Flu and strep negative. Admitted for treatment of hypokalemia and possible C. Diff vs COVID vs PE.     Overview/Hospital Course:  CTA done this am negative for PE. POX 96% RR 18. He is still tachycardiac. With low grade fever. And diarrhea persist. K+ is better after 5-10meq KCL riders and 20meq via g tube.     8/6: COVID negative. Still having diarrhea. Still tachycardia (rate 120s and regular); mom doesn't know if this is his baseline. RR back to normal. He is more alert and interactive with TV. K is normal. Stool studies still pending. C. Diff " negative.     8/7/2020 Blood Cx negative x 3d- but WBC trending up WBC 12.08> 14.45, will add Cipro , repeat CXR   Patient had 3 loose stools last shift/voiding per diaper. Stool was sent to lab for ova and parasites as ordered, c-diff negative    K+ 4.2   Getting IVFs @ 150ml/hr , HR Remains 114 sinus tach    8/8  Fever overnight. More agitated. HR up to 150s. Given propranolol and HR down to 100s. Less agitated. Slept okay. Still with foul smelling stool.    8/9  Switched from cipro to levaquin to cover for potential right lower lobe developing infiltrate. However, last night, he had higher fever. Discussed at length with mom that he has been on >10 rounds of different antibiotics over the last 2 months. Not always with a definitive diagnosis. Currently, he is still having foul smelling abnormally colored stools. He is non verbal and does not grimace with movement of his arms or legs or head. He also is unphased by abdominal exam.    8/10/2020 Tmax 101.9 , FUO, ? Infectious diarrhea tx ; cipro changed to Levofloxacin day 3 to cover for ? RLL pneumonia - but cxr not convincing,   Still getting flagyl. Switched to vanc and since, no fever, no diarrhea. Long discussion regarding ~11 abx in the past 2 months, has never been on Vancomycin  Did total body exam to rule out possible skin soft tissue etiology;   ? Echo pending;   BP stable, -116 , afebrile this am so some improvement with Vanc-   Resp infection panel  and flu swab negative, Blood Cx - NGTD x 2 d , repeat Covid negative, C- diff negative     8/11/2020 Tmax 103, 100.7 this am , multiple watery stools overnight, started yesterday afternoon and now has stage 2 buttock wound  Echo with normal EF60%, no mention of valvular abn but  Overall the study quality was poor. The study was difficult due to patient's uncooperativeness, clinical status and poor endocardial visualization.   He gets ensure plus feedings  at home and getting Isos[employer] here; could be  source of diarrhea ? But has fever also   Getting Vanc and flagyl ; day 3 vanc, day 4 levofloxacin/flagyl   Multiple stool negative for rotavirus, O&P negative   Abdomen is non tender but - Will CT abd and pelvis; if negative then will need transfer for ID/GI   This was discussed with mother     8/12 CT of abd pelivs yesterday suggestive of colitis and Subtle ground-glass opacity in the right lung base which could represent a developing inflammatory/infectious process or sequela of aspiration.   Started on Vanc per Peg tube, flagyl day 5 , Tmax 99.3 , did have some blood stools , tube feeding on hold , will resume in a few days  Now getting IV D5 1/2 with KCL 20meq   day 5  Levofloxacin for pneumonia ,   Turning q2 for buttocks with breakdown from recurrent diarrhea, moisture barrier applied   Noted to have low BP early this am; given 250ml IVF bolus , BP currently 118/70,  , o2 sat stable on RA    8/13 Day 2 of Vanc per peg , day 6 flagyl for colitis,  Day 6 Levofloxacin per PEG for Pneumonia  Tmax 99.4,  WBC finally trending down   Hold Peg feedings, getting IVFs , still having lots of diarrhea but getting better     8/14/20   Glen Moscoso is a 24 y.o. male  Who has cerebral palsy and is non-verbal.contractures ;s/p PEG tube .     Day 3 /10 Vanc per PEG, day 7/10 flagyl for colitis,  Day 7/ Levofloxacin per PEG for Pneumonia- will get CXR if improved can stop levofloxacin ( finished 7 days )  Now afebrile and normal WBC but, Still having mucoid/bloody diarrhea, K+ 3.6>3.4>2.9 ;   Getting D51/2 with KCL, Tube feedings on hold .    Add lactobacillus   Condition discussed with mother- possible d/c on Monday       8/15/20  CXR : clear lungs   DC Levaquin   Afebrile but diarrhea persists   Rash abdomen ; will try triamcinolone     Review of Systems   Unable to perform ROS: Patient nonverbal     Objective:     Vital Signs (Most Recent):  Temp: 98 °F (36.7 °C) (08/15/20 1132)  Pulse: 92 (08/15/20 1132)  Resp: 18  (08/15/20 1132)  BP: 115/71 (08/15/20 1132)  SpO2: 99 % (08/15/20 1132) Vital Signs (24h Range):  Temp:  [97.9 °F (36.6 °C)-98.5 °F (36.9 °C)] 98 °F (36.7 °C)  Pulse:  [] 92  Resp:  [17-20] 18  SpO2:  [92 %-99 %] 99 %  BP: (106-127)/(60-91) 115/71     Weight: 35.4 kg (78 lb)  Body mass index is 16.88 kg/m².    Intake/Output Summary (Last 24 hours) at 8/15/2020 1147  Last data filed at 8/15/2020 0734  Gross per 24 hour   Intake 1950 ml   Output 0 ml   Net 1950 ml      Physical Exam  Vitals signs and nursing note reviewed.   Constitutional:       Appearance: He is well-developed.   HENT:      Head: Normocephalic and atraumatic.      Right Ear: External ear normal.      Left Ear: External ear normal.      Nose: Nose normal.   Eyes:      General:         Right eye: No discharge.         Left eye: No discharge.      Conjunctiva/sclera: Conjunctivae normal.      Pupils: Pupils are equal, round, and reactive to light.      Comments: Strabismus right eye   Neck:      Musculoskeletal: Neck supple.      Thyroid: No thyromegaly.   Cardiovascular:      Rate and Rhythm: Normal rate and regular rhythm.      Heart sounds: No murmur. No friction rub. No gallop.       Comments: Tachycardia, regular  Pulmonary:      Effort: Pulmonary effort is normal. No respiratory distress.      Breath sounds: Normal breath sounds. No wheezing or rales.   Abdominal:      General: Abdomen is flat. Bowel sounds are normal. There is no distension.      Palpations: Abdomen is soft.      Tenderness: There is no abdominal tenderness.      Comments: PEG in place with mild redness, no drainage    Foul smelling stool   Musculoskeletal:      Right lower leg: No edema.      Left lower leg: No edema.      Comments: Muscle atrophy  contractures   Lymphadenopathy:      Cervical: No cervical adenopathy.   Skin:     General: Skin is warm and dry.             Comments: Scaly rash : ? Related to leak from peg tube ? Contact dermatitis    Neurological:       Mental Status: He is alert.      Comments: Patient alert but otherwise not responsive and can not cooperate in exam, severe CP   Psychiatric:      Comments: Uneasy in bed. Rotating around         Significant Labs:   CBC:   Recent Labs   Lab 08/14/20  0633 08/15/20  0626   WBC 10.45 12.29   HGB 11.0* 11.7*   HCT 34.0* 36.1*   * 503*     CMP:   Recent Labs   Lab 08/14/20  0633 08/15/20  0626    135*   K 2.9* 4.8   * 114*   CO2 14* 16*    95   BUN 3* 2*   CREATININE 0.7 0.7   CALCIUM 7.9* 8.2*   PROT 5.9* 6.2   ALBUMIN 2.4* 2.5*   BILITOT 0.2 0.2   ALKPHOS 74 75   AST 12 15   ALT 17 15   ANIONGAP 9 5*   EGFRNONAA >60 >60       Significant Imaging: CXR: I have reviewed all pertinent results/findings within the past 24 hours and my personal findings are:  clear lungs      Assessment/Plan:      * Hypokalemia  Replace IV and via PEG today  Labs in AM  Likely due to severe diarrhea  telemetry  K+ better 2.2>3.3 after 5 >> 10meq IV riders in ER and 20meq po   Will repeat g tube this am 40meq po once this am    8/6: resolved. Stop K in fluids today. Still having diarrhea so will keep 1 more day and repeat labs in morning to be sure remains stable as this was his main reason for admission  8/7 K+ 4.2 .    Resolved    8/9  Replace via G tube. Worsening with diarrhea  8/10 change KCL to powder for PEG tube   8/11 replace , due to  GI loss with significant diarrhea   8/12 continues with some diarrhea now with blood noted, K+ better 3.6 ; now getting D5 1/2 with KCL   8/13 K+ 3.4- replace   8/14 K+ 2.9- replace     8/15 : resolved    Decubitus ulcer of left buttock, stage 2    Moisture barrier oitment  Offload, turning q 2 .    Fever in adult  Not sure the cause of this.  In the last 2 months he has been innudated with antibiotics.  Cultures seem to always be negative.  Will get echo tomorrow.  Abx increased to cover mrsa. Addition of levaquin did not help with fevers, but wbc ct did improve. Will cont  both for now.  Broaden work up.   If no obvious etiology will d/w id.  8/10 A little better with Vanc - no BM over night, afebrile this am   Echo pending,   8/11 Echo stable, now with recurrent diarrhea and fever, CT of abd and pelvis- if negative for source will need transfer for ID eval, GI eval   8/12 IV vanc change to PEG to Tx colitis demonstrated per CT scan, temp trending down finally   8/13 Tmax 99.4   8/14 AFEBRILE x 24 hrs   8/15  DC levaquin     Aspiration pneumonitis  Recently admitted to [place] with this diagnosis  Started on Vanc yesterday ? RLL pneumonia   Not really convinced of this. First xr with questionable right sided changes. Repeat cxr clear.  8/11 Day 4 levofloxacin, day 3 Vanc- continues with fever and diarrhea   8/12 Day 5 levoflxacin, afebrile; CT abd and pelvis yesterday noting Subtle ground-glass opacity in the right lung base which could represent a developing inflammatory/infectious process or sequela of aspiration.  8/13 Day 6 Levofloxacin 500mg per PEG for pneumonia - Right lung per CT     8/15/20    Day 7  Levofloxacin 500mg per PEG for pneumonia - Right lung per CT   DC today .    Leukocytosis  Differential broad  Low grade fevers  C. Diff possible  CXR clear  UA clear  Aspiration in differential    Ruled out PE with CTA  Hold antibx for now as lactic acid and procal is normal but low threshold to start if any decompensation overnight     8/6: resolved without antibx treatment. Could be dehydration.     8/7  Slight bump again today. Start cipro flagyl and check cxr again.    8/9  Wbc ct improved today. But fevers still very high. Start on vanc (d/c levaquin) with recent hospital admission. Monitor fever curve.  8/10 WBC 9.42> 13.68, Resp infection panel  and flu swab negative, Blood Cx - NGTD x 2 d   vanc started - afebrile this am,   8/11 WBC 13.68>12.59 , but now having recurrent diarrhea and fever   8/12 WBC 12.59> 12.79, expect to improve with Tx for colitis    8/13 WBC  11.23 trending down finally   Lab Results   Component Value Date    WBC 12.29 08/15/2020    HGB 11.7 (L) 08/15/2020    HCT 36.1 (L) 08/15/2020    MCV 90 08/15/2020     (H) 08/15/2020         RESOLVED     Diarrhea of presumed infectious origin  Antibiotic associate diarrhea vs true C. Diff  C. Diff pending  If worsening clinical status will start PO Vanc for treatment; holding tonight as true clinical picture not yet clear    8/6:  Check stool studies--pending. C. Diff negative  Change NS 150cc/hr  8/7 Blood Cx negative, afebrile but WBC trending up; will add Cipro + flagyl and cont for 7 days  Stool for Ova/parasites   8/12 CT abd suggestive of colitis IV vanc changed to PER PEG , continue flagyl day 5/7 , Tmax 99.3 overnight     8/8  Stool studies all negative. Started on flagyl anyway. Will give him probiotics as well.    8/9  Add in rotavirus panel today. Continued on flagyl.    8/10  Maybe this is an occult c diff? Interestingly it improved with addition of IV vanc?    8/11  Day 4 levofloxacin, day 3 Vanc- continues with fever and diarrhea started up again , will need transfer for ID, GI    8/12 This is C diff colitis clinically although C diff test is negative.  Colitis on CT and he is responding to first 24 hours of Vanc per gastrostomy. No fever in over 24 hours. Will keep NPG until diarrhea improves as it is causing perianal skin breakdown. D5 1/2 NS with KCL for maintenance fluids    8/13 + Colitis, may have been C- diff despite negative stools, Has has multiple ABX over the past 2 months  Continue Vanc per PEG - day2 , day 6 flagyl. I would not resume PEG feeds until diarrhea clears up as it is causing skin breakdown     8/14 Vanc per PEG day 3, Day 7 flagyl, add lactobacillus.    8/15  Lactobacillus clogs PEG tube   Try questran     Tachypnea  Repeat COVID ordered; rapid negative but + cough, diarrhea, mild LFT elevation  CXR without infiltrates  D-dimer elevated so checking CTA for PE tonight.  Will start heparin gtt if CTA positive  No antibx for now with normal procal but aspiration in differential as well so could add Zosyn if needed  8/5: POX 96% on RA and RR down to 18/min. Maybe dehydration/hypoK played a role here    8/6: resolved. COVID negative. RR 18 from 30-40 on admit.   8/7 remains with - check EKG verify sinus tach     8/8  CXR looks like he has a developing infiltrate. Levaquin started today. Has fever, increased wbc ct and now infiltrate. Reorder blood culture.    8/9  Consulted pulm. Will get echo tomorrow and consult cards.   Resolved .    Sinus tachycardia  NS 10cc/hr  Likely some dehydration from frequent diarrhea (very low muscle mass so I trust Cr less)  D-dimer elevated so assessing for PE as well  CTA negative PE    Mom unsure his baseline heart rate.     Started on propranolol yesterday    8/9  With continued tachycardia and fever. Given single 500 cc bolus last night. Remains tachycardic. Worse with fevers    8/10  Improved with propranolol and vanc.  8/11 Reviewed old progress notes ; BP documented but not HR- not sure of pts baseline   8/13 Improving with IVF and treatment of presumed cdiff colitis     8/14 RESOLVED- HR 80s now that he is afebrile with normal WBC ..    Cerebral palsy  Cont home phenobarbital and baclofen.  Cont tube feeds per home routine via PEG  He is non-verbal and not mobile at baseline. He seems to be more interactive and grunting at TV today. Mom thinks this is his baseline.        VTE Risk Mitigation (From admission, onward)         Ordered     IP VTE LOW RISK PATIENT  Once      08/04/20 7758                Discharge Planning   ROCÍO:      Code Status: Full Code   Is the patient medically ready for discharge?:     Reason for patient still in hospital (select all that apply): Treatment  Discharge Plan A: Home Health                  Marianela Lugo MD  Department of Hospital Medicine   [facility]

## 2020-08-15 NOTE — PROGRESS NOTES
[facility]  Pulmonology  Progress Note    Patient Name: Glen Moscoso  MRN: [id number]  Admission Date: 8/4/2020  Hospital Length of Stay: 10 days  Code Status: Full Code  Attending Provider: Anjelica Brandt MD  Primary Care Provider: Yadi Lawson MD   Principal Problem: Hypokalemia    Subjective:     Interval History: still with diarrhea and now some blood breathing well       Objective:     Vital Signs (Most Recent):  Temp: 98 °F (36.7 °C) (08/15/20 1132)  Pulse: 87 (08/15/20 1200)  Resp: 18 (08/15/20 1132)  BP: 115/71 (08/15/20 1132)  SpO2: 99 % (08/15/20 1132) Vital Signs (24h Range):  Temp:  [97.9 °F (36.6 °C)-98.5 °F (36.9 °C)] 98 °F (36.7 °C)  Pulse:  [] 87  Resp:  [17-20] 18  SpO2:  [92 %-99 %] 99 %  BP: (106-127)/(60-91) 115/71     Weight: 35.4 kg (78 lb)  Body mass index is 16.88 kg/m².      Intake/Output Summary (Last 24 hours) at 8/15/2020 1321  Last data filed at 8/15/2020 0734  Gross per 24 hour   Intake 1950 ml   Output 0 ml   Net 1950 ml       Physical Exam  Vitals signs and nursing note reviewed.   Constitutional:       General: He is not in acute distress.     Appearance: Normal appearance. He is well-developed. He is not ill-appearing, toxic-appearing or diaphoretic.   HENT:      Head: Normocephalic and atraumatic.      Jaw: No trismus.      Right Ear: Hearing, tympanic membrane, ear canal and external ear normal.      Left Ear: Hearing, tympanic membrane, ear canal and external ear normal.      Nose: Nose normal. No nasal deformity, mucosal edema or rhinorrhea.      Right Sinus: No maxillary sinus tenderness or frontal sinus tenderness.      Left Sinus: No maxillary sinus tenderness or frontal sinus tenderness.      Mouth/Throat:      Dentition: Normal dentition.      Pharynx: Uvula midline. No posterior oropharyngeal erythema or uvula swelling.   Eyes:      General: Lids are normal. No scleral icterus.     Conjunctiva/sclera: Conjunctivae normal.      Comments: Sclera  clear bilat   Neck:      Musculoskeletal: Full passive range of motion without pain and neck supple.      Trachea: Trachea and phonation normal.   Cardiovascular:      Rate and Rhythm: Normal rate and regular rhythm.      Pulses: Normal pulses.      Heart sounds: Normal heart sounds.   Pulmonary:      Effort: Respiratory distress (mild to moderate) present.      Breath sounds: Examination of the right-middle field reveals rhonchi. Examination of the left-middle field reveals rhonchi. Rhonchi present. No decreased breath sounds or wheezing.   Abdominal:      General: Bowel sounds are normal. There is no distension.      Palpations: Abdomen is soft.      Tenderness: There is no abdominal tenderness.   Musculoskeletal: Normal range of motion.         General: No deformity.   Skin:     General: Skin is warm and dry.      Capillary Refill: Capillary refill takes less than 2 seconds.      Coloration: Skin is not pale.   Neurological:      Mental Status: He is alert and oriented to person, place, and time.      Motor: No abnormal muscle tone.      Coordination: Coordination normal.   Psychiatric:         Speech: Speech normal.         Behavior: Behavior normal. Behavior is cooperative.         Thought Content: Thought content normal.         Judgment: Judgment normal.         Vents:       Lines/Drains/Airways     Drain                 Gastrostomy/Enterostomy 08/04/20 1653 feeding 10 days          Peripheral Intravenous Line                 Peripheral IV - Single Lumen 08/14/20 0358 20 G Anterior;Right Forearm 1 day                Significant Labs:    CBC/Anemia Profile:  Recent Labs   Lab 08/14/20  0633 08/15/20  0626   WBC 10.45 12.29   HGB 11.0* 11.7*   HCT 34.0* 36.1*   * 503*   MCV 90 90   RDW 14.8* 15.0*        Chemistries:  Recent Labs   Lab 08/14/20  0633 08/15/20  0626    135*   K 2.9* 4.8   * 114*   CO2 14* 16*   BUN 3* 2*   CREATININE 0.7 0.7   CALCIUM 7.9* 8.2*   ALBUMIN 2.4* 2.5*   PROT 5.9*  6.2   BILITOT 0.2 0.2   ALKPHOS 74 75   ALT 17 15   AST 12 15       All pertinent labs within the past 24 hours have been reviewed.    Significant Imaging:  I have reviewed all pertinent imaging results/findings within the past 24 hours.    Assessment/Plan:     * Hypokalemia  Secondary to diarrhea     Aspiration pneumonitis  History in the past     Leukocytosis  stable    Diarrhea of presumed infectious origin  Diarrhea for 2 weeks watery     Sinus tachycardia  Mild dehydration     Cerebral palsy  Non communicating            Kar Moraes MD  Pulmonology  [facility]

## 2020-08-15 NOTE — SUBJECTIVE & OBJECTIVE
Interval History: still with diarrhea and now some blood breathing well       Objective:     Vital Signs (Most Recent):  Temp: 98 °F (36.7 °C) (08/15/20 1132)  Pulse: 87 (08/15/20 1200)  Resp: 18 (08/15/20 1132)  BP: 115/71 (08/15/20 1132)  SpO2: 99 % (08/15/20 1132) Vital Signs (24h Range):  Temp:  [97.9 °F (36.6 °C)-98.5 °F (36.9 °C)] 98 °F (36.7 °C)  Pulse:  [] 87  Resp:  [17-20] 18  SpO2:  [92 %-99 %] 99 %  BP: (106-127)/(60-91) 115/71     Weight: 35.4 kg (78 lb)  Body mass index is 16.88 kg/m².      Intake/Output Summary (Last 24 hours) at 8/15/2020 1321  Last data filed at 8/15/2020 0734  Gross per 24 hour   Intake 1950 ml   Output 0 ml   Net 1950 ml       Physical Exam  Vitals signs and nursing note reviewed.   Constitutional:       General: He is not in acute distress.     Appearance: Normal appearance. He is well-developed. He is not ill-appearing, toxic-appearing or diaphoretic.   HENT:      Head: Normocephalic and atraumatic.      Jaw: No trismus.      Right Ear: Hearing, tympanic membrane, ear canal and external ear normal.      Left Ear: Hearing, tympanic membrane, ear canal and external ear normal.      Nose: Nose normal. No nasal deformity, mucosal edema or rhinorrhea.      Right Sinus: No maxillary sinus tenderness or frontal sinus tenderness.      Left Sinus: No maxillary sinus tenderness or frontal sinus tenderness.      Mouth/Throat:      Dentition: Normal dentition.      Pharynx: Uvula midline. No posterior oropharyngeal erythema or uvula swelling.   Eyes:      General: Lids are normal. No scleral icterus.     Conjunctiva/sclera: Conjunctivae normal.      Comments: Sclera clear bilat   Neck:      Musculoskeletal: Full passive range of motion without pain and neck supple.      Trachea: Trachea and phonation normal.   Cardiovascular:      Rate and Rhythm: Normal rate and regular rhythm.      Pulses: Normal pulses.      Heart sounds: Normal heart sounds.   Pulmonary:      Effort: Respiratory  distress (mild to moderate) present.      Breath sounds: Examination of the right-middle field reveals rhonchi. Examination of the left-middle field reveals rhonchi. Rhonchi present. No decreased breath sounds or wheezing.   Abdominal:      General: Bowel sounds are normal. There is no distension.      Palpations: Abdomen is soft.      Tenderness: There is no abdominal tenderness.   Musculoskeletal: Normal range of motion.         General: No deformity.   Skin:     General: Skin is warm and dry.      Capillary Refill: Capillary refill takes less than 2 seconds.      Coloration: Skin is not pale.   Neurological:      Mental Status: He is alert and oriented to person, place, and time.      Motor: No abnormal muscle tone.      Coordination: Coordination normal.   Psychiatric:         Speech: Speech normal.         Behavior: Behavior normal. Behavior is cooperative.         Thought Content: Thought content normal.         Judgment: Judgment normal.         Vents:       Lines/Drains/Airways     Drain                 Gastrostomy/Enterostomy 08/04/20 1653 feeding 10 days          Peripheral Intravenous Line                 Peripheral IV - Single Lumen 08/14/20 0358 20 G Anterior;Right Forearm 1 day                Significant Labs:    CBC/Anemia Profile:  Recent Labs   Lab 08/14/20  0633 08/15/20  0626   WBC 10.45 12.29   HGB 11.0* 11.7*   HCT 34.0* 36.1*   * 503*   MCV 90 90   RDW 14.8* 15.0*        Chemistries:  Recent Labs   Lab 08/14/20  0633 08/15/20  0626    135*   K 2.9* 4.8   * 114*   CO2 14* 16*   BUN 3* 2*   CREATININE 0.7 0.7   CALCIUM 7.9* 8.2*   ALBUMIN 2.4* 2.5*   PROT 5.9* 6.2   BILITOT 0.2 0.2   ALKPHOS 74 75   ALT 17 15   AST 12 15       All pertinent labs within the past 24 hours have been reviewed.    Significant Imaging:  I have reviewed all pertinent imaging results/findings within the past 24 hours.

## 2020-08-15 NOTE — SUBJECTIVE & OBJECTIVE
Review of Systems   Unable to perform ROS: Patient nonverbal     Objective:     Vital Signs (Most Recent):  Temp: 98 °F (36.7 °C) (08/15/20 1132)  Pulse: 92 (08/15/20 1132)  Resp: 18 (08/15/20 1132)  BP: 115/71 (08/15/20 1132)  SpO2: 99 % (08/15/20 1132) Vital Signs (24h Range):  Temp:  [97.9 °F (36.6 °C)-98.5 °F (36.9 °C)] 98 °F (36.7 °C)  Pulse:  [] 92  Resp:  [17-20] 18  SpO2:  [92 %-99 %] 99 %  BP: (106-127)/(60-91) 115/71     Weight: 35.4 kg (78 lb)  Body mass index is 16.88 kg/m².    Intake/Output Summary (Last 24 hours) at 8/15/2020 1147  Last data filed at 8/15/2020 0734  Gross per 24 hour   Intake 1950 ml   Output 0 ml   Net 1950 ml      Physical Exam  Vitals signs and nursing note reviewed.   Constitutional:       Appearance: He is well-developed.   HENT:      Head: Normocephalic and atraumatic.      Right Ear: External ear normal.      Left Ear: External ear normal.      Nose: Nose normal.   Eyes:      General:         Right eye: No discharge.         Left eye: No discharge.      Conjunctiva/sclera: Conjunctivae normal.      Pupils: Pupils are equal, round, and reactive to light.      Comments: Strabismus right eye   Neck:      Musculoskeletal: Neck supple.      Thyroid: No thyromegaly.   Cardiovascular:      Rate and Rhythm: Normal rate and regular rhythm.      Heart sounds: No murmur. No friction rub. No gallop.       Comments: Tachycardia, regular  Pulmonary:      Effort: Pulmonary effort is normal. No respiratory distress.      Breath sounds: Normal breath sounds. No wheezing or rales.   Abdominal:      General: Abdomen is flat. Bowel sounds are normal. There is no distension.      Palpations: Abdomen is soft.      Tenderness: There is no abdominal tenderness.      Comments: PEG in place with mild redness, no drainage    Foul smelling stool   Musculoskeletal:      Right lower leg: No edema.      Left lower leg: No edema.      Comments: Muscle atrophy  contractures   Lymphadenopathy:       Cervical: No cervical adenopathy.   Skin:     General: Skin is warm and dry.             Comments: Scaly rash : ? Related to leak from peg tube ? Contact dermatitis    Neurological:      Mental Status: He is alert.      Comments: Patient alert but otherwise not responsive and can not cooperate in exam, severe CP   Psychiatric:      Comments: Uneasy in bed. Rotating around         Significant Labs:   CBC:   Recent Labs   Lab 08/14/20  0633 08/15/20  0626   WBC 10.45 12.29   HGB 11.0* 11.7*   HCT 34.0* 36.1*   * 503*     CMP:   Recent Labs   Lab 08/14/20  0633 08/15/20  0626    135*   K 2.9* 4.8   * 114*   CO2 14* 16*    95   BUN 3* 2*   CREATININE 0.7 0.7   CALCIUM 7.9* 8.2*   PROT 5.9* 6.2   ALBUMIN 2.4* 2.5*   BILITOT 0.2 0.2   ALKPHOS 74 75   AST 12 15   ALT 17 15   ANIONGAP 9 5*   EGFRNONAA >60 >60       Significant Imaging: CXR: I have reviewed all pertinent results/findings within the past 24 hours and my personal findings are:  clear lungs

## 2020-08-15 NOTE — PLAN OF CARE
Patient here with colitis. Medications being given via PEG tube but feedings beign held to rest belly.  Continuing PO Flagyl and Vancomycin being given via PEG.  Levaquin discontinued today as CXR clear yesterday and patient completed 7 day therapy for aspiration pneumonia.  Continues to have large, blood tinged diarrhea Bm's.  Rash developing on left hip as well as groins/buttock.  Steroid cream ordered as treatment.  Scrotum and buttock redness, barrier cream used to prevent breakdown.  Potassium level stable today at 4.8.  Patient remains on continuous infusion of D5 and 1/2 NS with 20meq of potassium.    Glucose checks AC and HS to prevent hypoglycemia.  Heart monitor shows ST.  VSS, afebrile for greater than 48 hours, on room air.

## 2020-08-15 NOTE — ASSESSMENT & PLAN NOTE
Replace IV and via PEG today  Labs in AM  Likely due to severe diarrhea  telemetry  K+ better 2.2>3.3 after 5 >> 10meq IV riders in ER and 20meq po   Will repeat g tube this am 40meq po once this am    8/6: resolved. Stop K in fluids today. Still having diarrhea so will keep 1 more day and repeat labs in morning to be sure remains stable as this was his main reason for admission  8/7 K+ 4.2 .    Resolved    8/9  Replace via G tube. Worsening with diarrhea  8/10 change KCL to powder for PEG tube   8/11 replace , due to  GI loss with significant diarrhea   8/12 continues with some diarrhea now with blood noted, K+ better 3.6 ; now getting D5 1/2 with KCL   8/13 K+ 3.4- replace   8/14 K+ 2.9- replace     8/15 : resolved

## 2020-08-15 NOTE — ASSESSMENT & PLAN NOTE
NS 10cc/hr  Likely some dehydration from frequent diarrhea (very low muscle mass so I trust Cr less)  D-dimer elevated so assessing for PE as well  CTA negative PE    Mom unsure his baseline heart rate.     Started on propranolol yesterday    8/9  With continued tachycardia and fever. Given single 500 cc bolus last night. Remains tachycardic. Worse with fevers    8/10  Improved with propranolol and vanc.  8/11 Reviewed old progress notes ; BP documented but not HR- not sure of pts baseline   8/13 Improving with IVF and treatment of presumed cdiff colitis     8/14 RESOLVED- HR 80s now that he is afebrile with normal WBC ..

## 2020-08-15 NOTE — ASSESSMENT & PLAN NOTE
Recently admitted to [facility] with this diagnosis  Started on Vanc yesterday ? RLL pneumonia   Not really convinced of this. First xr with questionable right sided changes. Repeat cxr clear.  8/11 Day 4 levofloxacin, day 3 Vanc- continues with fever and diarrhea   8/12 Day 5 levoflxacin, afebrile; CT abd and pelvis yesterday noting Subtle ground-glass opacity in the right lung base which could represent a developing inflammatory/infectious process or sequela of aspiration.  8/13 Day 6 Levofloxacin 500mg per PEG for pneumonia - Right lung per CT     8/15/20    Day 7  Levofloxacin 500mg per PEG for pneumonia - Right lung per CT   DC today .

## 2020-08-15 NOTE — ASSESSMENT & PLAN NOTE
Not sure the cause of this.  In the last 2 months he has been innudated with antibiotics.  Cultures seem to always be negative.  Will get echo tomorrow.  Abx increased to cover mrsa. Addition of levaquin did not help with fevers, but wbc ct did improve. Will cont both for now.  Broaden work up.   If no obvious etiology will d/w id.  8/10 A little better with Vanc - no BM over night, afebrile this am   Echo pending,   8/11 Echo stable, now with recurrent diarrhea and fever, CT of abd and pelvis- if negative for source will need transfer for ID eval, GI eval   8/12 IV vanc change to PEG to Tx colitis demonstrated per CT scan, temp trending down finally   8/13 Tmax 99.4   8/14 AFEBRILE x 24 hrs   8/15  DC levaquin

## 2020-08-15 NOTE — ASSESSMENT & PLAN NOTE
Antibiotic associate diarrhea vs true C. Diff  C. Diff pending  If worsening clinical status will start PO Vanc for treatment; holding tonight as true clinical picture not yet clear    8/6:  Check stool studies--pending. C. Diff negative  Change NS 150cc/hr  8/7 Blood Cx negative, afebrile but WBC trending up; will add Cipro + flagyl and cont for 7 days  Stool for Ova/parasites   8/12 CT abd suggestive of colitis IV vanc changed to PER PEG , continue flagyl day 5/7 , Tmax 99.3 overnight     8/8  Stool studies all negative. Started on flagyl anyway. Will give him probiotics as well.    8/9  Add in rotavirus panel today. Continued on flagyl.    8/10  Maybe this is an occult c diff? Interestingly it improved with addition of IV vanc?    8/11  Day 4 levofloxacin, day 3 Vanc- continues with fever and diarrhea started up again , will need transfer for ID, GI    8/12 This is C diff colitis clinically although C diff test is negative.  Colitis on CT and he is responding to first 24 hours of Vanc per gastrostomy. No fever in over 24 hours. Will keep NPG until diarrhea improves as it is causing perianal skin breakdown. D5 1/2 NS with KCL for maintenance fluids    8/13 + Colitis, may have been C- diff despite negative stools, Has has multiple ABX over the past 2 months  Continue Vanc per PEG - day2 , day 6 flagyl. I would not resume PEG feeds until diarrhea clears up as it is causing skin breakdown     8/14 Vanc per PEG day 3, Day 7 flagyl, add lactobacillus.    8/15  Lactobacillus clogs PEG tube   Try questran

## 2020-08-15 NOTE — ASSESSMENT & PLAN NOTE
Differential broad  Low grade fevers  C. Diff possible  CXR clear  UA clear  Aspiration in differential    Ruled out PE with CTA  Hold antibx for now as lactic acid and procal is normal but low threshold to start if any decompensation overnight     8/6: resolved without antibx treatment. Could be dehydration.     8/7  Slight bump again today. Start cipro flagyl and check cxr again.    8/9  Wbc ct improved today. But fevers still very high. Start on vanc (d/c levaquin) with recent hospital admission. Monitor fever curve.  8/10 WBC 9.42> 13.68, Resp infection panel  and flu swab negative, Blood Cx - NGTD x 2 d   vanc started - afebrile this am,   8/11 WBC 13.68>12.59 , but now having recurrent diarrhea and fever   8/12 WBC 12.59> 12.79, expect to improve with Tx for colitis    8/13 WBC 11.23 trending down finally   Lab Results   Component Value Date    WBC 12.29 08/15/2020    HGB 11.7 (L) 08/15/2020    HCT 36.1 (L) 08/15/2020    MCV 90 08/15/2020     (H) 08/15/2020         RESOLVED

## 2020-08-16 LAB
ALBUMIN SERPL BCP-MCNC: 2.4 G/DL (ref 3.5–5.2)
ALP SERPL-CCNC: 79 U/L (ref 55–135)
ALT SERPL W/O P-5'-P-CCNC: 19 U/L (ref 10–44)
ANION GAP SERPL CALC-SCNC: 7 MMOL/L (ref 8–16)
AST SERPL-CCNC: 21 U/L (ref 10–40)
BASOPHILS # BLD AUTO: 0.03 K/UL (ref 0–0.2)
BASOPHILS NFR BLD: 0.3 % (ref 0–1.9)
BILIRUB SERPL-MCNC: 0.2 MG/DL (ref 0.1–1)
BUN SERPL-MCNC: <2 MG/DL (ref 6–20)
CALCIUM SERPL-MCNC: 8 MG/DL (ref 8.7–10.5)
CHLORIDE SERPL-SCNC: 112 MMOL/L (ref 95–110)
CO2 SERPL-SCNC: 17 MMOL/L (ref 23–29)
CREAT SERPL-MCNC: 0.7 MG/DL (ref 0.5–1.4)
DIFFERENTIAL METHOD: ABNORMAL
EOSINOPHIL # BLD AUTO: 1.2 K/UL (ref 0–0.5)
EOSINOPHIL NFR BLD: 10.9 % (ref 0–8)
ERYTHROCYTE [DISTWIDTH] IN BLOOD BY AUTOMATED COUNT: 15.3 % (ref 11.5–14.5)
EST. GFR  (AFRICAN AMERICAN): >60 ML/MIN/1.73 M^2
EST. GFR  (NON AFRICAN AMERICAN): >60 ML/MIN/1.73 M^2
GLUCOSE SERPL-MCNC: 89 MG/DL (ref 70–110)
HCT VFR BLD AUTO: 33.6 % (ref 40–54)
HGB BLD-MCNC: 10.9 G/DL (ref 14–18)
IMM GRANULOCYTES # BLD AUTO: 0.07 K/UL (ref 0–0.04)
IMM GRANULOCYTES NFR BLD AUTO: 0.7 % (ref 0–0.5)
LYMPHOCYTES # BLD AUTO: 1.6 K/UL (ref 1–4.8)
LYMPHOCYTES NFR BLD: 14.8 % (ref 18–48)
MCH RBC QN AUTO: 28.8 PG (ref 27–31)
MCHC RBC AUTO-ENTMCNC: 32.4 G/DL (ref 32–36)
MCV RBC AUTO: 89 FL (ref 82–98)
MONOCYTES # BLD AUTO: 1 K/UL (ref 0.3–1)
MONOCYTES NFR BLD: 9.7 % (ref 4–15)
NEUTROPHILS # BLD AUTO: 6.8 K/UL (ref 1.8–7.7)
NEUTROPHILS NFR BLD: 63.6 % (ref 38–73)
NRBC BLD-RTO: 0 /100 WBC
PLATELET # BLD AUTO: 553 K/UL (ref 150–350)
PLATELET BLD QL SMEAR: ABNORMAL
PMV BLD AUTO: 10.8 FL (ref 9.2–12.9)
POCT GLUCOSE: 106 MG/DL (ref 70–110)
POCT GLUCOSE: 80 MG/DL (ref 70–110)
POCT GLUCOSE: 87 MG/DL (ref 70–110)
POCT GLUCOSE: 98 MG/DL (ref 70–110)
POTASSIUM SERPL-SCNC: 3.9 MMOL/L (ref 3.5–5.1)
PROT SERPL-MCNC: 6.3 G/DL (ref 6–8.4)
RBC # BLD AUTO: 3.79 M/UL (ref 4.6–6.2)
SODIUM SERPL-SCNC: 136 MMOL/L (ref 136–145)
WBC # BLD AUTO: 10.64 K/UL (ref 3.9–12.7)

## 2020-08-16 PROCEDURE — 25000003 PHARM REV CODE 250: Performed by: INTERNAL MEDICINE

## 2020-08-16 PROCEDURE — 25000003 PHARM REV CODE 250: Performed by: FAMILY MEDICINE

## 2020-08-16 PROCEDURE — 80053 COMPREHEN METABOLIC PANEL: CPT

## 2020-08-16 PROCEDURE — 94761 N-INVAS EAR/PLS OXIMETRY MLT: CPT

## 2020-08-16 PROCEDURE — 25000003 PHARM REV CODE 250: Performed by: NURSE PRACTITIONER

## 2020-08-16 PROCEDURE — 11000001 HC ACUTE MED/SURG PRIVATE ROOM

## 2020-08-16 PROCEDURE — 36415 COLL VENOUS BLD VENIPUNCTURE: CPT

## 2020-08-16 PROCEDURE — 85025 COMPLETE CBC W/AUTO DIFF WBC: CPT

## 2020-08-16 PROCEDURE — 99233 SBSQ HOSP IP/OBS HIGH 50: CPT | Mod: ,,, | Performed by: INTERNAL MEDICINE

## 2020-08-16 PROCEDURE — 99233 PR SUBSEQUENT HOSPITAL CARE,LEVL III: ICD-10-PCS | Mod: ,,, | Performed by: INTERNAL MEDICINE

## 2020-08-16 RX ADMIN — MICONAZOLE NITRATE: 20 OINTMENT TOPICAL at 10:08

## 2020-08-16 RX ADMIN — GABAPENTIN 250 MG: 250 SOLUTION ORAL at 09:08

## 2020-08-16 RX ADMIN — CHOLESTYRAMINE 4 G: 4 POWDER, FOR SUSPENSION ORAL at 09:08

## 2020-08-16 RX ADMIN — DEXTROSE, SODIUM CHLORIDE, AND POTASSIUM CHLORIDE: 5; .45; .15 INJECTION INTRAVENOUS at 11:08

## 2020-08-16 RX ADMIN — PHENOBARBITAL 100 MG: 20 ELIXIR ORAL at 09:08

## 2020-08-16 RX ADMIN — VANCOMYCIN HYDROCHLORIDE 125 MG: KIT at 12:08

## 2020-08-16 RX ADMIN — PROPRANOLOL HYDROCHLORIDE 20 MG: 20 TABLET ORAL at 09:08

## 2020-08-16 RX ADMIN — METRONIDAZOLE 500 MG: 500 TABLET ORAL at 05:08

## 2020-08-16 RX ADMIN — TRIAMCINOLONE ACETONIDE: 1 CREAM TOPICAL at 09:08

## 2020-08-16 RX ADMIN — BACLOFEN 10 MG: 10 TABLET ORAL at 09:08

## 2020-08-16 RX ADMIN — VANCOMYCIN HYDROCHLORIDE 125 MG: KIT at 06:08

## 2020-08-16 RX ADMIN — DEXTROSE, SODIUM CHLORIDE, AND POTASSIUM CHLORIDE: 5; .45; .15 INJECTION INTRAVENOUS at 01:08

## 2020-08-16 RX ADMIN — TRIAMCINOLONE ACETONIDE: 1 CREAM TOPICAL at 10:08

## 2020-08-16 RX ADMIN — METRONIDAZOLE 500 MG: 500 TABLET ORAL at 09:08

## 2020-08-16 RX ADMIN — PROPRANOLOL HYDROCHLORIDE 20 MG: 20 TABLET ORAL at 03:08

## 2020-08-16 RX ADMIN — VANCOMYCIN HYDROCHLORIDE 125 MG: KIT at 05:08

## 2020-08-16 RX ADMIN — MICONAZOLE NITRATE: 20 OINTMENT TOPICAL at 12:08

## 2020-08-16 RX ADMIN — BACLOFEN 10 MG: 10 TABLET ORAL at 03:08

## 2020-08-16 RX ADMIN — METRONIDAZOLE 500 MG: 500 TABLET ORAL at 03:08

## 2020-08-16 RX ADMIN — DEXTROSE, SODIUM CHLORIDE, AND POTASSIUM CHLORIDE: 5; .45; .15 INJECTION INTRAVENOUS at 10:08

## 2020-08-16 NOTE — PLAN OF CARE
Afibrile. Stools have  in frequency and volume. Nayely area remains red with rash, no increase, mild improvement. BS are coarse oral suctioned for clear thick sputum. Antibiotics continue. NSR with PVCs on the monitor.

## 2020-08-16 NOTE — ASSESSMENT & PLAN NOTE
Not sure the cause of this.  In the last 2 months he has been innudated with antibiotics.  Cultures seem to always be negative.  Will get echo tomorrow.  Abx increased to cover mrsa. Addition of levaquin did not help with fevers, but wbc ct did improve. Will cont both for now.  Broaden work up.   If no obvious etiology will d/w id.  8/10 A little better with Vanc - no BM over night, afebrile this am   Echo pending,   8/11 Echo stable, now with recurrent diarrhea and fever, CT of abd and pelvis- if negative for source will need transfer for ID eval, GI eval   8/12 IV vanc change to PEG to Tx colitis demonstrated per CT scan, temp trending down finally   8/13 Tmax 99.4   8/14 AFEBRILE x 24 hrs   8/15  DC levaquin       RESOLVED

## 2020-08-16 NOTE — ASSESSMENT & PLAN NOTE
Replace IV and via PEG today  Labs in AM  Likely due to severe diarrhea  telemetry  K+ better 2.2>3.3 after 5 >> 10meq IV riders in ER and 20meq po   Will repeat g tube this am 40meq po once this am    8/6: resolved. Stop K in fluids today. Still having diarrhea so will keep 1 more day and repeat labs in morning to be sure remains stable as this was his main reason for admission  8/7 K+ 4.2 .    Resolved    8/9  Replace via G tube. Worsening with diarrhea  8/10 change KCL to powder for PEG tube   8/11 replace , due to  GI loss with significant diarrhea   8/12 continues with some diarrhea now with blood noted, K+ better 3.6 ; now getting D5 1/2 with KCL   8/13 K+ 3.4- replace   8/14 K+ 2.9- replace     8/15 : resolved  BMP  Lab Results   Component Value Date     08/16/2020    K 3.9 08/16/2020     (H) 08/16/2020    CO2 17 (L) 08/16/2020    BUN <2 (L) 08/16/2020    CREATININE 0.7 08/16/2020    CALCIUM 8.0 (L) 08/16/2020    ANIONGAP 7 (L) 08/16/2020    ESTGFRAFRICA >60 08/16/2020    EGFRNONAA >60 08/16/2020

## 2020-08-16 NOTE — PLAN OF CARE
Patient remains NPO as tube feedings being held due to colitis.   Medications being given via PEG tube.  Patient on oral vancomycin and Flagyl.    Continues to have large, blood tinged mucousy diarrhea Bm's.  However, one BM today was free of blood.  Steroid cream to rash on left hip.    Potassium level stable today at 3.9.  Patient remains on continuous infusion of D5 and 1/2 NS with 20meq of potassium.    Glucose checks AC and HS to prevent hypoglycemia.  Heart monitor shows ST.  VSS, afebrile, on room air. shows ST.

## 2020-08-16 NOTE — ASSESSMENT & PLAN NOTE
NS 10cc/hr  Likely some dehydration from frequent diarrhea (very low muscle mass so I trust Cr less)  D-dimer elevated so assessing for PE as well  CTA negative PE    Mom unsure his baseline heart rate.     Started on propranolol yesterday    8/9  With continued tachycardia and fever. Given single 500 cc bolus last night. Remains tachycardic. Worse with fevers    8/10  Improved with propranolol and vanc.  8/11 Reviewed old progress notes ; BP documented but not HR- not sure of pts baseline   8/13 Improving with IVF and treatment of presumed cdiff colitis     8/14 RESOLVED- HR 80s now that he is afebrile with normal WBC

## 2020-08-16 NOTE — ASSESSMENT & PLAN NOTE
Differential broad  Low grade fevers  C. Diff possible  CXR clear  UA clear  Aspiration in differential    Ruled out PE with CTA  Hold antibx for now as lactic acid and procal is normal but low threshold to start if any decompensation overnight     8/6: resolved without antibx treatment. Could be dehydration.     8/7  Slight bump again today. Start cipro flagyl and check cxr again.    8/9  Wbc ct improved today. But fevers still very high. Start on vanc (d/c levaquin) with recent hospital admission. Monitor fever curve.  8/10 WBC 9.42> 13.68, Resp infection panel  and flu swab negative, Blood Cx - NGTD x 2 d   vanc started - afebrile this am,   8/11 WBC 13.68>12.59 , but now having recurrent diarrhea and fever   8/12 WBC 12.59> 12.79, expect to improve with Tx for colitis    8/13 WBC 11.23 trending down finally   Lab Results   Component Value Date    WBC 10.64 08/16/2020    HGB 10.9 (L) 08/16/2020    HCT 33.6 (L) 08/16/2020    MCV 89 08/16/2020     (H) 08/16/2020         RESOLVED .

## 2020-08-16 NOTE — PROGRESS NOTES
"[facility] Medicine  Progress Note    Patient Name: Glen Moscoso  MRN: [id number]  Patient Class: IP- Inpatient   Admission Date: 8/4/2020  Length of Stay: 11 days  Attending Physician: Anjelica Brandt MD  Primary Care Provider: Yadi Lawson MD        Subjective:     Principal Problem:Hypokalemia        HPI:  Patient presented to ER with fever and cough. Patient has cerebral palsy and is non-verbal. Mother provides history. He was seen at [facility] ER 2 weeks ago and diagnosed with bronchitis and strep throat. Treated with amoxil. He completed antibx 4 days ago. Mom reports loose watery stools 3-4x a day for almost 2 weeks now. She noted he had fever yesterday "He was burning up" but did not check with thermometer. She notes he is breathing a little heavier than normal. + cough. She does not think he is in any pain. No changes to tube feeds (Ensure 4x a day via PEG). No sick contacts or recent travel but was in [facility] ER and was hospitalized at [place] before that for "aspiration" and on antibx then. Patient has low grade temp 99.5. HR 120s. RR 30s. Sats 96% on RA. Rapid COVID negative however note mild elevation LFTs, LDH, ferritin pending. D-dimer elevated, CTA pending. Lactic acid and procalcitonin normal. Repeat COVID swab ordered. WBC 15K. UA not infectious. K 2.2--likely due to diarrhea. GFR > 60 (Cr 0.7 but very low muscle mass). CXR clear. Flu and strep negative. Admitted for treatment of hypokalemia and possible C. Diff vs COVID vs PE.     Overview/Hospital Course:  CTA done this am negative for PE. POX 96% RR 18. He is still tachycardiac. With low grade fever. And diarrhea persist. K+ is better after 5-10meq KCL riders and 20meq via g tube.     8/6: COVID negative. Still having diarrhea. Still tachycardia (rate 120s and regular); mom doesn't know if this is his baseline. RR back to normal. He is more alert and interactive with TV. K is normal. Stool studies still pending. C. Diff " negative.     8/7/2020 Blood Cx negative x 3d- but WBC trending up WBC 12.08> 14.45, will add Cipro , repeat CXR   Patient had 3 loose stools last shift/voiding per diaper. Stool was sent to lab for ova and parasites as ordered, c-diff negative    K+ 4.2   Getting IVFs @ 150ml/hr , HR Remains 114 sinus tach    8/8  Fever overnight. More agitated. HR up to 150s. Given propranolol and HR down to 100s. Less agitated. Slept okay. Still with foul smelling stool.    8/9  Switched from cipro to levaquin to cover for potential right lower lobe developing infiltrate. However, last night, he had higher fever. Discussed at length with mom that he has been on >10 rounds of different antibiotics over the last 2 months. Not always with a definitive diagnosis. Currently, he is still having foul smelling abnormally colored stools. He is non verbal and does not grimace with movement of his arms or legs or head. He also is unphased by abdominal exam.    8/10/2020 Tmax 101.9 , FUO, ? Infectious diarrhea tx ; cipro changed to Levofloxacin day 3 to cover for ? RLL pneumonia - but cxr not convincing,   Still getting flagyl. Switched to vanc and since, no fever, no diarrhea. Long discussion regarding ~11 abx in the past 2 months, has never been on Vancomycin  Did total body exam to rule out possible skin soft tissue etiology;   ? Echo pending;   BP stable, -116 , afebrile this am so some improvement with Vanc-   Resp infection panel  and flu swab negative, Blood Cx - NGTD x 2 d , repeat Covid negative, C- diff negative     8/11/2020 Tmax 103, 100.7 this am , multiple watery stools overnight, started yesterday afternoon and now has stage 2 buttock wound  Echo with normal EF60%, no mention of valvular abn but  Overall the study quality was poor. The study was difficult due to patient's uncooperativeness, clinical status and poor endocardial visualization.   He gets ensure plus feedings  at home and getting Is[employer] here; could be  source of diarrhea ? But has fever also   Getting Vanc and flagyl ; day 3 vanc, day 4 levofloxacin/flagyl   Multiple stool negative for rotavirus, O&P negative   Abdomen is non tender but - Will CT abd and pelvis; if negative then will need transfer for ID/GI   This was discussed with mother     8/12 CT of abd pelivs yesterday suggestive of colitis and Subtle ground-glass opacity in the right lung base which could represent a developing inflammatory/infectious process or sequela of aspiration.   Started on Vanc per Peg tube, flagyl day 5 , Tmax 99.3 , did have some blood stools , tube feeding on hold , will resume in a few days  Now getting IV D5 1/2 with KCL 20meq   day 5  Levofloxacin for pneumonia ,   Turning q2 for buttocks with breakdown from recurrent diarrhea, moisture barrier applied   Noted to have low BP early this am; given 250ml IVF bolus , BP currently 118/70,  , o2 sat stable on RA    8/13 Day 2 of Vanc per peg , day 6 flagyl for colitis,  Day 6 Levofloxacin per PEG for Pneumonia  Tmax 99.4,  WBC finally trending down   Hold Peg feedings, getting IVFs , still having lots of diarrhea but getting better     8/14/20   Glen Moscoso is a 24 y.o. male  Who has cerebral palsy and is non-verbal.contractures ;s/p PEG tube .     Day 3 /10 Vanc per PEG, day 7/10 flagyl for colitis,  Day 7/ Levofloxacin per PEG for Pneumonia- will get CXR if improved can stop levofloxacin ( finished 7 days )  Now afebrile and normal WBC but, Still having mucoid/bloody diarrhea, K+ 3.6>3.4>2.9 ;   Getting D51/2 with KCL, Tube feedings on hold .    Add lactobacillus   Condition discussed with mother- possible d/c on Monday       8/15/20  CXR : clear lungs   DC Levaquin   Afebrile but diarrhea persists   Rash abdomen ; will try triamcinolone     8/16/20  Afebrile ;  colitis being treated with vancomycin  And flagyl .  Cholestyramine added yesterday .     But diarrhea with blood continues  Rash in buttocks     No new subjective  & objective note has been filed under this hospital service since the last note was generated.      Assessment/Plan:      * Hypokalemia  Replace IV and via PEG today  Labs in AM  Likely due to severe diarrhea  telemetry  K+ better 2.2>3.3 after 5 >> 10meq IV riders in ER and 20meq po   Will repeat g tube this am 40meq po once this am    8/6: resolved. Stop K in fluids today. Still having diarrhea so will keep 1 more day and repeat labs in morning to be sure remains stable as this was his main reason for admission  8/7 K+ 4.2 .    Resolved    8/9  Replace via G tube. Worsening with diarrhea  8/10 change KCL to powder for PEG tube   8/11 replace , due to  GI loss with significant diarrhea   8/12 continues with some diarrhea now with blood noted, K+ better 3.6 ; now getting D5 1/2 with KCL   8/13 K+ 3.4- replace   8/14 K+ 2.9- replace     8/15 : resolved  BMP  Lab Results   Component Value Date     08/16/2020    K 3.9 08/16/2020     (H) 08/16/2020    CO2 17 (L) 08/16/2020    BUN <2 (L) 08/16/2020    CREATININE 0.7 08/16/2020    CALCIUM 8.0 (L) 08/16/2020    ANIONGAP 7 (L) 08/16/2020    ESTGFRAFRICA >60 08/16/2020    EGFRNONAA >60 08/16/2020         Decubitus ulcer of left buttock, stage 2    Moisture barrier oitment  Offload, turning q 2 .    Fever in adult  Not sure the cause of this.  In the last 2 months he has been innudated with antibiotics.  Cultures seem to always be negative.  Will get echo tomorrow.  Abx increased to cover mrsa. Addition of levaquin did not help with fevers, but wbc ct did improve. Will cont both for now.  Broaden work up.   If no obvious etiology will d/w id.  8/10 A little better with Vanc - no BM over night, afebrile this am   Echo pending,   8/11 Echo stable, now with recurrent diarrhea and fever, CT of abd and pelvis- if negative for source will need transfer for ID eval, GI eval   8/12 IV vanc change to PEG to Tx colitis demonstrated per CT scan, temp trending down finally    8/13 Tmax 99.4   8/14 AFEBRILE x 24 hrs   8/15  DC levaquin       RESOLVED     Aspiration pneumonitis  Recently admitted to [place] with this diagnosis  Started on Vanc yesterday ? RLL pneumonia   Not really convinced of this. First xr with questionable right sided changes. Repeat cxr clear.  8/11 Day 4 levofloxacin, day 3 Vanc- continues with fever and diarrhea   8/12 Day 5 levoflxacin, afebrile; CT abd and pelvis yesterday noting Subtle ground-glass opacity in the right lung base which could represent a developing inflammatory/infectious process or sequela of aspiration.  8/13 Day 6 Levofloxacin 500mg per PEG for pneumonia - Right lung per CT     8/15/20  Day 7  Levofloxacin 500mg per PEG for pneumonia - Right lung per CT   DC today .    8/16/20  resolved    Leukocytosis  Differential broad  Low grade fevers  C. Diff possible  CXR clear  UA clear  Aspiration in differential    Ruled out PE with CTA  Hold antibx for now as lactic acid and procal is normal but low threshold to start if any decompensation overnight     8/6: resolved without antibx treatment. Could be dehydration.     8/7  Slight bump again today. Start cipro flagyl and check cxr again.    8/9  Wbc ct improved today. But fevers still very high. Start on vanc (d/c levaquin) with recent hospital admission. Monitor fever curve.  8/10 WBC 9.42> 13.68, Resp infection panel  and flu swab negative, Blood Cx - NGTD x 2 d   vanc started - afebrile this am,   8/11 WBC 13.68>12.59 , but now having recurrent diarrhea and fever   8/12 WBC 12.59> 12.79, expect to improve with Tx for colitis    8/13 WBC 11.23 trending down finally   Lab Results   Component Value Date    WBC 10.64 08/16/2020    HGB 10.9 (L) 08/16/2020    HCT 33.6 (L) 08/16/2020    MCV 89 08/16/2020     (H) 08/16/2020         RESOLVED .    Diarrhea of presumed infectious origin  Antibiotic associate diarrhea vs true C. Diff  C. Diff pending  If worsening clinical status will start PO Vanc  for treatment; holding tonight as true clinical picture not yet clear    8/6:  Check stool studies--pending. C. Diff negative  Change NS 150cc/hr  8/7 Blood Cx negative, afebrile but WBC trending up; will add Cipro + flagyl and cont for 7 days  Stool for Ova/parasites   8/12 CT abd suggestive of colitis IV vanc changed to PER PEG , continue flagyl day 5/7 , Tmax 99.3 overnight     8/8  Stool studies all negative. Started on flagyl anyway. Will give him probiotics as well.    8/9  Add in rotavirus panel today. Continued on flagyl.    8/10  Maybe this is an occult c diff? Interestingly it improved with addition of IV vanc?    8/11  Day 4 levofloxacin, day 3 Vanc- continues with fever and diarrhea started up again , will need transfer for ID, GI    8/12 This is C diff colitis clinically although C diff test is negative.  Colitis on CT and he is responding to first 24 hours of Vanc per gastrostomy. No fever in over 24 hours. Will keep NPG until diarrhea improves as it is causing perianal skin breakdown. D5 1/2 NS with KCL for maintenance fluids    8/13 + Colitis, may have been C- diff despite negative stools, Has has multiple ABX over the past 2 months  Continue Vanc per PEG - day2 , day 6 flagyl. I would not resume PEG feeds until diarrhea clears up as it is causing skin breakdown     8/14 Vanc per PEG day 3, Day 7 flagyl, add lactobacillus.    8/15  Lactobacillus clogs PEG tube   Try questran .    Tachypnea  Repeat COVID ordered; rapid negative but + cough, diarrhea, mild LFT elevation  CXR without infiltrates  D-dimer elevated so checking CTA for PE tonight. Will start heparin gtt if CTA positive  No antibx for now with normal procal but aspiration in differential as well so could add Zosyn if needed  8/5: POX 96% on RA and RR down to 18/min. Maybe dehydration/hypoK played a role here    8/6: resolved. COVID negative. RR 18 from 30-40 on admit.   8/7 remains with - check EKG verify sinus tach     8/8  CXR looks  like he has a developing infiltrate. Levaquin started today. Has fever, increased wbc ct and now infiltrate. Reorder blood culture.    8/9  Consulted pulm. Will get echo tomorrow and consult cards.   Resolved     Sinus tachycardia  NS 10cc/hr  Likely some dehydration from frequent diarrhea (very low muscle mass so I trust Cr less)  D-dimer elevated so assessing for PE as well  CTA negative PE    Mom unsure his baseline heart rate.     Started on propranolol yesterday    8/9  With continued tachycardia and fever. Given single 500 cc bolus last night. Remains tachycardic. Worse with fevers    8/10  Improved with propranolol and vanc.  8/11 Reviewed old progress notes ; BP documented but not HR- not sure of pts baseline   8/13 Improving with IVF and treatment of presumed cdiff colitis     8/14 RESOLVED- HR 80s now that he is afebrile with normal WBC    Cerebral palsy  Cont home phenobarbital and baclofen.  Cont tube feeds per home routine via PEG  He is non-verbal and not mobile at baseline. He seems to be more interactive and grunting at TV today. Mom thinks this is his baseline        VTE Risk Mitigation (From admission, onward)         Ordered     IP VTE LOW RISK PATIENT  Once      08/04/20 3350                Discharge Planning   ROCÍO:      Code Status: Full Code   Is the patient medically ready for discharge?:     Reason for patient still in hospital (select all that apply): Treatment  Discharge Plan A: Home Health                  Marianela Lugo MD  Department of Hospital Medicine   [facility]

## 2020-08-16 NOTE — ASSESSMENT & PLAN NOTE
Recently admitted to [place] with this diagnosis  Started on Vanc yesterday ? RLL pneumonia   Not really convinced of this. First xr with questionable right sided changes. Repeat cxr clear.  8/11 Day 4 levofloxacin, day 3 Vanc- continues with fever and diarrhea   8/12 Day 5 levoflxacin, afebrile; CT abd and pelvis yesterday noting Subtle ground-glass opacity in the right lung base which could represent a developing inflammatory/infectious process or sequela of aspiration.  8/13 Day 6 Levofloxacin 500mg per PEG for pneumonia - Right lung per CT     8/15/20  Day 7  Levofloxacin 500mg per PEG for pneumonia - Right lung per CT   DC today .    8/16/20  resolved

## 2020-08-16 NOTE — PLAN OF CARE
08/16/20 1344   Discharge Reassessment   Assessment Type Discharge Planning Reassessment   Provided patient/caregiver education on the expected discharge date and the discharge plan Yes   Do you have any problems affording any of your prescribed medications? No   Discharge Plan A Home Health   Anticipated Discharge Disposition Home-Health   Can the patient/caregiver answer the patient profile reliably? Yes, cognitively intact  (caregiver responses)   Describe the patient's ability to walk at the present time. Does not walk or unable to take any steps at all   How often would a person be available to care for the patient? Whenever needed     Glen will return home with his mother when stable. He will continue care under [facility] once discharged.

## 2020-08-16 NOTE — PROGRESS NOTES
[facility]  Pulmonology  Progress Note    Patient Name: Glen Moscoso  MRN: [id number]  Admission Date: 8/4/2020  Hospital Length of Stay: 11 days  Code Status: Full Code  Attending Provider: Anjelica Brandt MD  Primary Care Provider: Yadi Lawson MD   Principal Problem: Hypokalemia    Subjective:     Interval History: continue diarrhea lung issues no sign of aspiration     Objective:     Vital Signs (Most Recent):  Temp: 98.5 °F (36.9 °C) (08/16/20 1112)  Pulse: 95 (08/16/20 1400)  Resp: 18 (08/16/20 1112)  BP: 111/78 (08/16/20 1112)  SpO2: 100 % (08/16/20 1112) Vital Signs (24h Range):  Temp:  [96.4 °F (35.8 °C)-99.3 °F (37.4 °C)] 98.5 °F (36.9 °C)  Pulse:  [] 95  Resp:  [18-20] 18  SpO2:  [96 %-100 %] 100 %  BP: (108-132)/(57-88) 111/78     Weight: 35.4 kg (78 lb)  Body mass index is 16.88 kg/m².      Intake/Output Summary (Last 24 hours) at 8/16/2020 1527  Last data filed at 8/16/2020 0710  Gross per 24 hour   Intake 1350 ml   Output --   Net 1350 ml       Physical Exam  Vitals signs and nursing note reviewed.   Constitutional:       General: He is not in acute distress.     Appearance: Normal appearance. He is well-developed. He is not ill-appearing, toxic-appearing or diaphoretic.   HENT:      Head: Normocephalic and atraumatic.      Jaw: No trismus.      Right Ear: Hearing, tympanic membrane, ear canal and external ear normal.      Left Ear: Hearing, tympanic membrane, ear canal and external ear normal.      Nose: Nose normal. No nasal deformity, mucosal edema or rhinorrhea.      Right Sinus: No maxillary sinus tenderness or frontal sinus tenderness.      Left Sinus: No maxillary sinus tenderness or frontal sinus tenderness.      Mouth/Throat:      Dentition: Normal dentition.      Pharynx: Uvula midline. No posterior oropharyngeal erythema or uvula swelling.   Eyes:      General: Lids are normal. No scleral icterus.     Conjunctiva/sclera: Conjunctivae normal.      Comments: Sclera  clear bilat   Neck:      Musculoskeletal: Full passive range of motion without pain and neck supple.      Trachea: Trachea and phonation normal.   Cardiovascular:      Rate and Rhythm: Normal rate and regular rhythm.      Pulses: Normal pulses.      Heart sounds: Normal heart sounds and S2 normal. No murmur.   Pulmonary:      Effort: Respiratory distress (mild to moderate) present. No accessory muscle usage.      Breath sounds: No decreased air movement. Examination of the right-upper field reveals decreased breath sounds. Examination of the left-upper field reveals decreased breath sounds. Examination of the right-middle field reveals decreased breath sounds and rhonchi. Examination of the left-middle field reveals decreased breath sounds and rhonchi. Examination of the right-lower field reveals decreased breath sounds, rhonchi and rales. Examination of the left-lower field reveals decreased breath sounds, rhonchi and rales. Decreased breath sounds, rhonchi and rales present. No wheezing.   Abdominal:      General: Bowel sounds are normal. There is no distension.      Palpations: Abdomen is soft.      Tenderness: There is no abdominal tenderness.   Musculoskeletal: Normal range of motion.         General: No deformity.   Skin:     General: Skin is warm and dry.      Coloration: Skin is not pale.   Neurological:      Mental Status: He is alert and oriented to person, place, and time.      Motor: No abnormal muscle tone.      Coordination: Coordination normal.   Psychiatric:         Speech: Speech normal.         Behavior: Behavior normal. Behavior is cooperative.         Thought Content: Thought content normal.         Judgment: Judgment normal.         Vents:       Lines/Drains/Airways     Drain                 Gastrostomy/Enterostomy 08/04/20 1653 feeding 11 days          Peripheral Intravenous Line                 Peripheral IV - Single Lumen 08/14/20 0358 20 G Anterior;Right Forearm 2 days                 Significant Labs:    CBC/Anemia Profile:  Recent Labs   Lab 08/15/20  0626 08/16/20  0621   WBC 12.29 10.64   HGB 11.7* 10.9*   HCT 36.1* 33.6*   * 553*   MCV 90 89   RDW 15.0* 15.3*        Chemistries:  Recent Labs   Lab 08/15/20  0626 08/16/20  0621   * 136   K 4.8 3.9   * 112*   CO2 16* 17*   BUN 2* <2*   CREATININE 0.7 0.7   CALCIUM 8.2* 8.0*   ALBUMIN 2.5* 2.4*   PROT 6.2 6.3   BILITOT 0.2 0.2   ALKPHOS 75 79   ALT 15 19   AST 15 21     cxr the RLL area of concern for aspiration has resolved   All pertinent labs within the past 24 hours have been reviewed.    Significant Imaging:  I have reviewed all pertinent imaging results/findings within the past 24 hours.    Assessment/Plan:     * Hypokalemia  Secondary to diarrhea     Aspiration pneumonitis  History in the past     Leukocytosis  stable    Diarrhea of presumed infectious origin  Diarrhea for 2 weeks watery     Sinus tachycardia  Mild dehydration     Cerebral palsy  Non communicating            Kar Moraes MD  Pulmonology  [facility]

## 2020-08-16 NOTE — ASSESSMENT & PLAN NOTE
Repeat COVID ordered; rapid negative but + cough, diarrhea, mild LFT elevation  CXR without infiltrates  D-dimer elevated so checking CTA for PE tonight. Will start heparin gtt if CTA positive  No antibx for now with normal procal but aspiration in differential as well so could add Zosyn if needed  8/5: POX 96% on RA and RR down to 18/min. Maybe dehydration/hypoK played a role here    8/6: resolved. COVID negative. RR 18 from 30-40 on admit.   8/7 remains with - check EKG verify sinus tach     8/8  CXR looks like he has a developing infiltrate. Levaquin started today. Has fever, increased wbc ct and now infiltrate. Reorder blood culture.    8/9  Consulted pulm. Will get echo tomorrow and consult cards.   Resolved    Male

## 2020-08-16 NOTE — SUBJECTIVE & OBJECTIVE
Interval History: continue diarrhea lung issues no sign of aspiration     Objective:     Vital Signs (Most Recent):  Temp: 98.5 °F (36.9 °C) (08/16/20 1112)  Pulse: 95 (08/16/20 1400)  Resp: 18 (08/16/20 1112)  BP: 111/78 (08/16/20 1112)  SpO2: 100 % (08/16/20 1112) Vital Signs (24h Range):  Temp:  [96.4 °F (35.8 °C)-99.3 °F (37.4 °C)] 98.5 °F (36.9 °C)  Pulse:  [] 95  Resp:  [18-20] 18  SpO2:  [96 %-100 %] 100 %  BP: (108-132)/(57-88) 111/78     Weight: 35.4 kg (78 lb)  Body mass index is 16.88 kg/m².      Intake/Output Summary (Last 24 hours) at 8/16/2020 1527  Last data filed at 8/16/2020 0710  Gross per 24 hour   Intake 1350 ml   Output --   Net 1350 ml       Physical Exam  Vitals signs and nursing note reviewed.   Constitutional:       General: He is not in acute distress.     Appearance: Normal appearance. He is well-developed. He is not ill-appearing, toxic-appearing or diaphoretic.   HENT:      Head: Normocephalic and atraumatic.      Jaw: No trismus.      Right Ear: Hearing, tympanic membrane, ear canal and external ear normal.      Left Ear: Hearing, tympanic membrane, ear canal and external ear normal.      Nose: Nose normal. No nasal deformity, mucosal edema or rhinorrhea.      Right Sinus: No maxillary sinus tenderness or frontal sinus tenderness.      Left Sinus: No maxillary sinus tenderness or frontal sinus tenderness.      Mouth/Throat:      Dentition: Normal dentition.      Pharynx: Uvula midline. No posterior oropharyngeal erythema or uvula swelling.   Eyes:      General: Lids are normal. No scleral icterus.     Conjunctiva/sclera: Conjunctivae normal.      Comments: Sclera clear bilat   Neck:      Musculoskeletal: Full passive range of motion without pain and neck supple.      Trachea: Trachea and phonation normal.   Cardiovascular:      Rate and Rhythm: Normal rate and regular rhythm.      Pulses: Normal pulses.      Heart sounds: Normal heart sounds and S2 normal. No murmur.   Pulmonary:       Effort: Respiratory distress (mild to moderate) present. No accessory muscle usage.      Breath sounds: No decreased air movement. Examination of the right-upper field reveals decreased breath sounds. Examination of the left-upper field reveals decreased breath sounds. Examination of the right-middle field reveals decreased breath sounds and rhonchi. Examination of the left-middle field reveals decreased breath sounds and rhonchi. Examination of the right-lower field reveals decreased breath sounds, rhonchi and rales. Examination of the left-lower field reveals decreased breath sounds, rhonchi and rales. Decreased breath sounds, rhonchi and rales present. No wheezing.   Abdominal:      General: Bowel sounds are normal. There is no distension.      Palpations: Abdomen is soft.      Tenderness: There is no abdominal tenderness.   Musculoskeletal: Normal range of motion.         General: No deformity.   Skin:     General: Skin is warm and dry.      Coloration: Skin is not pale.   Neurological:      Mental Status: He is alert and oriented to person, place, and time.      Motor: No abnormal muscle tone.      Coordination: Coordination normal.   Psychiatric:         Speech: Speech normal.         Behavior: Behavior normal. Behavior is cooperative.         Thought Content: Thought content normal.         Judgment: Judgment normal.         Vents:       Lines/Drains/Airways     Drain                 Gastrostomy/Enterostomy 08/04/20 1653 feeding 11 days          Peripheral Intravenous Line                 Peripheral IV - Single Lumen 08/14/20 0358 20 G Anterior;Right Forearm 2 days                Significant Labs:    CBC/Anemia Profile:  Recent Labs   Lab 08/15/20  0626 08/16/20  0621   WBC 12.29 10.64   HGB 11.7* 10.9*   HCT 36.1* 33.6*   * 553*   MCV 90 89   RDW 15.0* 15.3*        Chemistries:  Recent Labs   Lab 08/15/20  0626 08/16/20  0621   * 136   K 4.8 3.9   * 112*   CO2 16* 17*   BUN 2* <2*    CREATININE 0.7 0.7   CALCIUM 8.2* 8.0*   ALBUMIN 2.5* 2.4*   PROT 6.2 6.3   BILITOT 0.2 0.2   ALKPHOS 75 79   ALT 15 19   AST 15 21     cxr the RLL area of concern for aspiration has resolved   All pertinent labs within the past 24 hours have been reviewed.    Significant Imaging:  I have reviewed all pertinent imaging results/findings within the past 24 hours.

## 2020-08-16 NOTE — ASSESSMENT & PLAN NOTE
Antibiotic associate diarrhea vs true C. Diff  C. Diff pending  If worsening clinical status will start PO Vanc for treatment; holding tonight as true clinical picture not yet clear    8/6:  Check stool studies--pending. C. Diff negative  Change NS 150cc/hr  8/7 Blood Cx negative, afebrile but WBC trending up; will add Cipro + flagyl and cont for 7 days  Stool for Ova/parasites   8/12 CT abd suggestive of colitis IV vanc changed to PER PEG , continue flagyl day 5/7 , Tmax 99.3 overnight     8/8  Stool studies all negative. Started on flagyl anyway. Will give him probiotics as well.    8/9  Add in rotavirus panel today. Continued on flagyl.    8/10  Maybe this is an occult c diff? Interestingly it improved with addition of IV vanc?    8/11  Day 4 levofloxacin, day 3 Vanc- continues with fever and diarrhea started up again , will need transfer for ID, GI    8/12 This is C diff colitis clinically although C diff test is negative.  Colitis on CT and he is responding to first 24 hours of Vanc per gastrostomy. No fever in over 24 hours. Will keep NPG until diarrhea improves as it is causing perianal skin breakdown. D5 1/2 NS with KCL for maintenance fluids    8/13 + Colitis, may have been C- diff despite negative stools, Has has multiple ABX over the past 2 months  Continue Vanc per PEG - day2 , day 6 flagyl. I would not resume PEG feeds until diarrhea clears up as it is causing skin breakdown     8/14 Vanc per PEG day 3, Day 7 flagyl, add lactobacillus.    8/15  Lactobacillus clogs PEG tube   Try questran .

## 2020-08-17 PROBLEM — K52.9 COLITIS: Status: ACTIVE | Noted: 2020-08-17

## 2020-08-17 LAB
ALBUMIN SERPL BCP-MCNC: 2.1 G/DL (ref 3.5–5.2)
ALP SERPL-CCNC: 68 U/L (ref 55–135)
ALT SERPL W/O P-5'-P-CCNC: 19 U/L (ref 10–44)
ANION GAP SERPL CALC-SCNC: 7 MMOL/L (ref 8–16)
ANISOCYTOSIS BLD QL SMEAR: SLIGHT
AST SERPL-CCNC: 21 U/L (ref 10–40)
BASOPHILS # BLD AUTO: ABNORMAL K/UL (ref 0–0.2)
BASOPHILS NFR BLD: 0 % (ref 0–1.9)
BILIRUB SERPL-MCNC: 0.2 MG/DL (ref 0.1–1)
BUN SERPL-MCNC: <2 MG/DL (ref 6–20)
CALCIUM SERPL-MCNC: 7.5 MG/DL (ref 8.7–10.5)
CHLORIDE SERPL-SCNC: 114 MMOL/L (ref 95–110)
CO2 SERPL-SCNC: 15 MMOL/L (ref 23–29)
CREAT SERPL-MCNC: 0.6 MG/DL (ref 0.5–1.4)
DIFFERENTIAL METHOD: ABNORMAL
EOSINOPHIL # BLD AUTO: ABNORMAL K/UL (ref 0–0.5)
EOSINOPHIL NFR BLD: 14 % (ref 0–8)
ERYTHROCYTE [DISTWIDTH] IN BLOOD BY AUTOMATED COUNT: 15.3 % (ref 11.5–14.5)
EST. GFR  (AFRICAN AMERICAN): >60 ML/MIN/1.73 M^2
EST. GFR  (NON AFRICAN AMERICAN): >60 ML/MIN/1.73 M^2
GLUCOSE SERPL-MCNC: 111 MG/DL (ref 70–110)
HCT VFR BLD AUTO: 35.3 % (ref 40–54)
HGB BLD-MCNC: 11.7 G/DL (ref 14–18)
IMM GRANULOCYTES # BLD AUTO: ABNORMAL K/UL (ref 0–0.04)
IMM GRANULOCYTES NFR BLD AUTO: ABNORMAL % (ref 0–0.5)
LYMPHOCYTES # BLD AUTO: ABNORMAL K/UL (ref 1–4.8)
LYMPHOCYTES NFR BLD: 12 % (ref 18–48)
MCH RBC QN AUTO: 29 PG (ref 27–31)
MCHC RBC AUTO-ENTMCNC: 33.1 G/DL (ref 32–36)
MCV RBC AUTO: 87 FL (ref 82–98)
MONOCYTES # BLD AUTO: ABNORMAL K/UL (ref 0.3–1)
MONOCYTES NFR BLD: 7 % (ref 4–15)
NEUTROPHILS NFR BLD: 63 % (ref 38–73)
NEUTS BAND NFR BLD MANUAL: 4 %
NRBC BLD-RTO: 0 /100 WBC
PLATELET # BLD AUTO: 528 K/UL (ref 150–350)
PLATELET BLD QL SMEAR: ABNORMAL
PMV BLD AUTO: 9.3 FL (ref 9.2–12.9)
POCT GLUCOSE: 115 MG/DL (ref 70–110)
POCT GLUCOSE: 66 MG/DL (ref 70–110)
POCT GLUCOSE: 83 MG/DL (ref 70–110)
POCT GLUCOSE: 92 MG/DL (ref 70–110)
POTASSIUM SERPL-SCNC: 3.1 MMOL/L (ref 3.5–5.1)
PROT SERPL-MCNC: 5.3 G/DL (ref 6–8.4)
RBC # BLD AUTO: 4.04 M/UL (ref 4.6–6.2)
SODIUM SERPL-SCNC: 136 MMOL/L (ref 136–145)
WBC # BLD AUTO: 12.21 K/UL (ref 3.9–12.7)

## 2020-08-17 PROCEDURE — 25000003 PHARM REV CODE 250: Performed by: NURSE PRACTITIONER

## 2020-08-17 PROCEDURE — 25000003 PHARM REV CODE 250: Performed by: FAMILY MEDICINE

## 2020-08-17 PROCEDURE — 36415 COLL VENOUS BLD VENIPUNCTURE: CPT

## 2020-08-17 PROCEDURE — 99233 PR SUBSEQUENT HOSPITAL CARE,LEVL III: ICD-10-PCS | Mod: ,,, | Performed by: INTERNAL MEDICINE

## 2020-08-17 PROCEDURE — 94761 N-INVAS EAR/PLS OXIMETRY MLT: CPT

## 2020-08-17 PROCEDURE — 80053 COMPREHEN METABOLIC PANEL: CPT

## 2020-08-17 PROCEDURE — 99233 SBSQ HOSP IP/OBS HIGH 50: CPT | Mod: ,,, | Performed by: INTERNAL MEDICINE

## 2020-08-17 PROCEDURE — 25000003 PHARM REV CODE 250: Performed by: INTERNAL MEDICINE

## 2020-08-17 PROCEDURE — 85027 COMPLETE CBC AUTOMATED: CPT

## 2020-08-17 PROCEDURE — 85007 BL SMEAR W/DIFF WBC COUNT: CPT

## 2020-08-17 PROCEDURE — 11000001 HC ACUTE MED/SURG PRIVATE ROOM

## 2020-08-17 RX ORDER — POTASSIUM CHLORIDE 1.5 G/1.58G
40 POWDER, FOR SOLUTION ORAL ONCE
Status: COMPLETED | OUTPATIENT
Start: 2020-08-17 | End: 2020-08-17

## 2020-08-17 RX ADMIN — PROPRANOLOL HYDROCHLORIDE 20 MG: 20 TABLET ORAL at 09:08

## 2020-08-17 RX ADMIN — PROPRANOLOL HYDROCHLORIDE 20 MG: 20 TABLET ORAL at 02:08

## 2020-08-17 RX ADMIN — BACLOFEN 10 MG: 10 TABLET ORAL at 08:08

## 2020-08-17 RX ADMIN — CHOLESTYRAMINE 4 G: 4 POWDER, FOR SUSPENSION ORAL at 08:08

## 2020-08-17 RX ADMIN — BACLOFEN 10 MG: 10 TABLET ORAL at 09:08

## 2020-08-17 RX ADMIN — VANCOMYCIN HYDROCHLORIDE 125 MG: KIT at 06:08

## 2020-08-17 RX ADMIN — VANCOMYCIN HYDROCHLORIDE 125 MG: KIT at 05:08

## 2020-08-17 RX ADMIN — POTASSIUM CHLORIDE 40 MEQ: 1.5 POWDER, FOR SOLUTION ORAL at 10:08

## 2020-08-17 RX ADMIN — VANCOMYCIN HYDROCHLORIDE 125 MG: KIT at 01:08

## 2020-08-17 RX ADMIN — PROPRANOLOL HYDROCHLORIDE 20 MG: 20 TABLET ORAL at 08:08

## 2020-08-17 RX ADMIN — METRONIDAZOLE 500 MG: 500 TABLET ORAL at 10:08

## 2020-08-17 RX ADMIN — DEXTROSE, SODIUM CHLORIDE, AND POTASSIUM CHLORIDE: 5; .45; .15 INJECTION INTRAVENOUS at 10:08

## 2020-08-17 RX ADMIN — TRIAMCINOLONE ACETONIDE: 1 CREAM TOPICAL at 08:08

## 2020-08-17 RX ADMIN — MICONAZOLE NITRATE: 20 OINTMENT TOPICAL at 10:08

## 2020-08-17 RX ADMIN — MICONAZOLE NITRATE: 20 OINTMENT TOPICAL at 08:08

## 2020-08-17 RX ADMIN — TRIAMCINOLONE ACETONIDE: 1 CREAM TOPICAL at 10:08

## 2020-08-17 RX ADMIN — GABAPENTIN 250 MG: 250 SOLUTION ORAL at 09:08

## 2020-08-17 RX ADMIN — METRONIDAZOLE 500 MG: 500 TABLET ORAL at 02:08

## 2020-08-17 RX ADMIN — PHENOBARBITAL 100 MG: 20 ELIXIR ORAL at 09:08

## 2020-08-17 RX ADMIN — VANCOMYCIN HYDROCHLORIDE 125 MG: KIT at 12:08

## 2020-08-17 RX ADMIN — METRONIDAZOLE 500 MG: 500 TABLET ORAL at 05:08

## 2020-08-17 RX ADMIN — BACLOFEN 10 MG: 10 TABLET ORAL at 02:08

## 2020-08-17 RX ADMIN — CHOLESTYRAMINE 4 G: 4 POWDER, FOR SUSPENSION ORAL at 09:08

## 2020-08-17 NOTE — PLAN OF CARE
Recommendation:   1. Continue Isosource 1.5 Bolus Feeds as appropriate @ 4 cans/day to provide 1500 kcals, 68 g pro, and 764 mL enteral fluid      A. Fluid flush of 160 mL q6hrs or per MD   2. RD to monitor     Interventions:  Enteral nutrition  Collaboration with other providers     Goals: meet >85% of needs by discharge  Nutrition Goal Status: progressing towards goal  Nutrition Discharge Planning: nutrition through PEG

## 2020-08-17 NOTE — ASSESSMENT & PLAN NOTE
Not sure the cause of this.  In the last 2 months he has been innudated with antibiotics.  Cultures seem to always be negative.  Will get echo tomorrow.  Abx increased to cover mrsa. Addition of levaquin did not help with fevers, but wbc ct did improve. Will cont both for now.  Broaden work up.   If no obvious etiology will d/w id.  8/10 A little better with Vanc - no BM over night, afebrile this am   Echo pending,   8/11 Echo stable, now with recurrent diarrhea and fever, CT of abd and pelvis- if negative for source will need transfer for ID eval, GI eval   8/12 IV vanc change to PEG to Tx colitis demonstrated per CT scan, temp trending down finally   8/13 Tmax 99.4   8/14 AFEBRILE x 24 hrs   8/15  DC levaquin       RESOLVED .

## 2020-08-17 NOTE — ASSESSMENT & PLAN NOTE
Antibiotic associate diarrhea vs true C. Diff  C. Diff pending  If worsening clinical status will start PO Vanc for treatment; holding tonight as true clinical picture not yet clear    8/6:  Check stool studies--pending. C. Diff negative  Change NS 150cc/hr  8/7 Blood Cx negative, afebrile but WBC trending up; will add Cipro + flagyl and cont for 7 days  Stool for Ova/parasites   8/12 CT abd suggestive of colitis IV vanc changed to PER PEG , continue flagyl day 5/7 , Tmax 99.3 overnight     8/8  Stool studies all negative. Started on flagyl anyway. Will give him probiotics as well.    8/9  Add in rotavirus panel today. Continued on flagyl.    8/10  Maybe this is an occult c diff? Interestingly it improved with addition of IV vanc?    8/11  Day 4 levofloxacin, day 3 Vanc- continues with fever and diarrhea started up again , will need transfer for ID, GI    8/12 This is C diff colitis clinically although C diff test is negative.  Colitis on CT and he is responding to first 24 hours of Vanc per gastrostomy. No fever in over 24 hours. Will keep NPG until diarrhea improves as it is causing perianal skin breakdown. D5 1/2 NS with KCL for maintenance fluids    8/13 + Colitis, may have been C- diff despite negative stools, Has has multiple ABX over the past 2 months  Continue Vanc per PEG - day2 , day 6 flagyl. I would not resume PEG feeds until diarrhea clears up as it is causing skin breakdown     8/14 Vanc per PEG day 3, Day 7 flagyl, add lactobacillus.    8/15  Lactobacillus clogs PEG tube   Try questran .    8/17 diarrhea is better on questran, resume feeds today,  Cont vanc and flagyl for now.

## 2020-08-17 NOTE — PLAN OF CARE
Problem: Adult Inpatient Plan of Care  Goal: Plan of Care Review  Outcome: Ongoing, Progressing     Problem: Infection  Goal: Infection Symptom Resolution  Outcome: Ongoing, Progressing     Problem: Skin Injury Risk Increased  Goal: Skin Health and Integrity  Outcome: Ongoing, Progressing     Problem: Wound  Goal: Optimal Wound Healing  Outcome: Ongoing, Progressing       Pt doing well. No question/concerns at this time. Agrees with poc. No pain/falls.  Vital signs  pulse ox and temp are stable. Feeding added back this am. We decreased it to bolus feeding BID. I did isosource this am and he had 3 bms throughout the day. Will try his normal ensure tonight for his feeding and see how he does.  Iv fluids stopped today being feeding restarted.c accu checks have been stable.   He is SR on tele

## 2020-08-17 NOTE — PLAN OF CARE
BS are diminished with rhonchi, 02SA 100% on RA. Afebrile. Blood glucose WDL. Stool x1 this shift, mucoid greenish in color, antibiotics continue. Turned q2hr IV fluids infusing. Mother at bedside. NSR on the monitor.

## 2020-08-17 NOTE — ASSESSMENT & PLAN NOTE
Differential broad  Low grade fevers  C. Diff possible  CXR clear  UA clear  Aspiration in differential    Ruled out PE with CTA  Hold antibx for now as lactic acid and procal is normal but low threshold to start if any decompensation overnight .    8/6: resolved without antibx treatment. Could be dehydration.     8/7  Slight bump again today. Start cipro flagyl and check cxr again.    8/9  Wbc ct improved today. But fevers still very high. Start on vanc (d/c levaquin) with recent hospital admission. Monitor fever curve.  8/10 WBC 9.42> 13.68, Resp infection panel  and flu swab negative, Blood Cx - NGTD x 2 d   vanc started - afebrile this am,   8/11 WBC 13.68>12.59 , but now having recurrent diarrhea and fever   8/12 WBC 12.59> 12.79, expect to improve with Tx for colitis    8/13 WBC 11.23 trending down finally   Lab Results   Component Value Date    WBC 12.21 08/17/2020    HGB 11.7 (L) 08/17/2020    HCT 35.3 (L) 08/17/2020    MCV 87 08/17/2020     (H) 08/17/2020         RESOLVED .

## 2020-08-17 NOTE — PROGRESS NOTES
"[facility] Medicine  Progress Note    Patient Name: Glen Moscoso  MRN: [id number]  Patient Class: IP- Inpatient   Admission Date: 8/4/2020  Length of Stay: 12 days  Attending Physician: Anjelica Brandt MD  Primary Care Provider: Yadi Lawson MD        Subjective:     Principal Problem:Hypokalemia        HPI:  Patient presented to ER with fever and cough. Patient has cerebral palsy and is non-verbal. Mother provides history. He was seen at [facility] ER 2 weeks ago and diagnosed with bronchitis and strep throat. Treated with amoxil. He completed antibx 4 days ago. Mom reports loose watery stools 3-4x a day for almost 2 weeks now. She noted he had fever yesterday "He was burning up" but did not check with thermometer. She notes he is breathing a little heavier than normal. + cough. She does not think he is in any pain. No changes to tube feeds (Ensure 4x a day via PEG). No sick contacts or recent travel but was in [facility] ER and was hospitalized at [facility] before that for "aspiration" and on antibx then. Patient has low grade temp 99.5. HR 120s. RR 30s. Sats 96% on RA. Rapid COVID negative however note mild elevation LFTs, LDH, ferritin pending. D-dimer elevated, CTA pending. Lactic acid and procalcitonin normal. Repeat COVID swab ordered. WBC 15K. UA not infectious. K 2.2--likely due to diarrhea. GFR > 60 (Cr 0.7 but very low muscle mass). CXR clear. Flu and strep negative. Admitted for treatment of hypokalemia and possible C. Diff vs COVID vs PE.     Overview/Hospital Course:  CTA done this am negative for PE. POX 96% RR 18. He is still tachycardiac. With low grade fever. And diarrhea persist. K+ is better after 5-10meq KCL riders and 20meq via g tube.     8/6: COVID negative. Still having diarrhea. Still tachycardia (rate 120s and regular); mom doesn't know if this is his baseline. RR back to normal. He is more alert and interactive with TV. K is normal. Stool studies still pending. C. Diff " negative.     8/7/2020 Blood Cx negative x 3d- but WBC trending up WBC 12.08> 14.45, will add Cipro , repeat CXR   Patient had 3 loose stools last shift/voiding per diaper. Stool was sent to lab for ova and parasites as ordered, c-diff negative    K+ 4.2   Getting IVFs @ 150ml/hr , HR Remains 114 sinus tach    8/8  Fever overnight. More agitated. HR up to 150s. Given propranolol and HR down to 100s. Less agitated. Slept okay. Still with foul smelling stool.    8/9  Switched from cipro to levaquin to cover for potential right lower lobe developing infiltrate. However, last night, he had higher fever. Discussed at length with mom that he has been on >10 rounds of different antibiotics over the last 2 months. Not always with a definitive diagnosis. Currently, he is still having foul smelling abnormally colored stools. He is non verbal and does not grimace with movement of his arms or legs or head. He also is unphased by abdominal exam.    8/10/2020 Tmax 101.9 , FUO, ? Infectious diarrhea tx ; cipro changed to Levofloxacin day 3 to cover for ? RLL pneumonia - but cxr not convincing,   Still getting flagyl. Switched to vanc and since, no fever, no diarrhea. Long discussion regarding ~11 abx in the past 2 months, has never been on Vancomycin  Did total body exam to rule out possible skin soft tissue etiology;   ? Echo pending;   BP stable, -116 , afebrile this am so some improvement with Vanc-   Resp infection panel  and flu swab negative, Blood Cx - NGTD x 2 d , repeat Covid negative, C- diff negative     8/11/2020 Tmax 103, 100.7 this am , multiple watery stools overnight, started yesterday afternoon and now has stage 2 buttock wound  Echo with normal EF60%, no mention of valvular abn but  Overall the study quality was poor. The study was difficult due to patient's uncooperativeness, clinical status and poor endocardial visualization.   He gets ensure plus feedings  at home and getting Isosource here; could be  source of diarrhea ? But has fever also   Getting Vanc and flagyl ; day 3 vanc, day 4 levofloxacin/flagyl   Multiple stool negative for rotavirus, O&P negative   Abdomen is non tender but - Will CT abd and pelvis; if negative then will need transfer for ID/GI   This was discussed with mother     8/12 CT of abd pelivs yesterday suggestive of colitis and Subtle ground-glass opacity in the right lung base which could represent a developing inflammatory/infectious process or sequela of aspiration.   Started on Vanc per Peg tube, flagyl day 5 , Tmax 99.3 , did have some blood stools , tube feeding on hold , will resume in a few days  Now getting IV D5 1/2 with KCL 20meq   day 5  Levofloxacin for pneumonia ,   Turning q2 for buttocks with breakdown from recurrent diarrhea, moisture barrier applied   Noted to have low BP early this am; given 250ml IVF bolus , BP currently 118/70,  , o2 sat stable on RA    8/13 Day 2 of Vanc per peg , day 6 flagyl for colitis,  Day 6 Levofloxacin per PEG for Pneumonia  Tmax 99.4,  WBC finally trending down   Hold Peg feedings, getting IVFs , still having lots of diarrhea but getting better     8/14/20   Glen Moscoso is a 24 y.o. male  Who has cerebral palsy and is non-verbal.contractures ;s/p PEG tube .     Day 3 /10 Vanc per PEG, day 7/10 flagyl for colitis,  Day 7/ Levofloxacin per PEG for Pneumonia- will get CXR if improved can stop levofloxacin ( finished 7 days )  Now afebrile and normal WBC but, Still having mucoid/bloody diarrhea, K+ 3.6>3.4>2.9 ;   Getting D51/2 with KCL, Tube feedings on hold .    Add lactobacillus   Condition discussed with mother- possible d/c on Monday       8/15/20  CXR : clear lungs   DC Levaquin   Afebrile but diarrhea persists   Rash abdomen ; will try triamcinolone     8/16/20  Afebrile ;  colitis being treated with vancomycin  And flagyl .  Cholestyramine added yesterday .     But diarrhea with blood continues  Rash in buttocks     8/17/20  He had 2  BM over last 24hr. No bloody. Feeds have also been on hold. Will resume today. Low grade fever 99.7. No elevated WBC. K 3.1 today  Hoping for no diarrhea ; DC home in am     Review of Systems   Unable to perform ROS: Patient nonverbal     Objective:     Vital Signs (Most Recent):  Temp: 96.4 °F (35.8 °C) (08/17/20 0810)  Pulse: 98 (08/17/20 0837)  Resp: 18 (08/17/20 0810)  BP: 109/72 (08/17/20 0810)  SpO2: 97 % (08/17/20 0812) Vital Signs (24h Range):  Temp:  [96.4 °F (35.8 °C)-99.7 °F (37.6 °C)] 96.4 °F (35.8 °C)  Pulse:  [68-99] 98  Resp:  [18-20] 18  SpO2:  [95 %-100 %] 97 %  BP: (106-121)/(63-78) 109/72     Weight: 35.4 kg (78 lb)  Body mass index is 16.88 kg/m².    Intake/Output Summary (Last 24 hours) at 8/17/2020 0939  Last data filed at 8/17/2020 0800  Gross per 24 hour   Intake 1400 ml   Output --   Net 1400 ml      Physical Exam  Vitals signs and nursing note reviewed.   Constitutional:       Appearance: He is well-developed. He is ill-appearing and toxic-appearing.   HENT:      Head: Normocephalic and atraumatic.      Right Ear: External ear normal.      Left Ear: External ear normal.      Nose: Nose normal.   Eyes:      General:         Right eye: No discharge.         Left eye: No discharge.      Conjunctiva/sclera: Conjunctivae normal.      Pupils: Pupils are equal, round, and reactive to light.      Comments: Strabismus right eye   Neck:      Musculoskeletal: Neck supple.      Thyroid: No thyromegaly.   Cardiovascular:      Rate and Rhythm: Normal rate and regular rhythm.      Heart sounds: No murmur. No friction rub. No gallop.       Comments: Tachycardia, regular  Pulmonary:      Effort: Pulmonary effort is normal. No respiratory distress.      Breath sounds: Normal breath sounds. No wheezing or rales.   Abdominal:      General: Abdomen is flat. Bowel sounds are normal. There is no distension.      Palpations: Abdomen is soft.      Tenderness: There is no abdominal tenderness.      Comments: PEG in  place with mild redness, no drainage    Foul smelling stool   Musculoskeletal:      Right lower leg: No edema.      Left lower leg: No edema.      Comments: Muscle atrophy  contractures   Lymphadenopathy:      Cervical: No cervical adenopathy.   Skin:     General: Skin is warm and dry.             Comments: Scaly rash : ? Related to leak from peg tube ? Contact dermatitis    Neurological:      Mental Status: He is alert.      Comments: Patient alert but otherwise not responsive and can not cooperate in exam, severe CP   Psychiatric:      Comments: Uneasy in bed. Rotating around         Significant Labs:   CBC:   Recent Labs   Lab 20  0641   WBC 10.64 12.21   HGB 10.9* 11.7*   HCT 33.6* 35.3*   * 528*     CMP:   Recent Labs   Lab 20  0641    136   K 3.9 3.1*   * 114*   CO2 17* 15*   GLU 89 111*   BUN <2* <2*   CREATININE 0.7 0.6   CALCIUM 8.0* 7.5*   PROT 6.3 5.3*   ALBUMIN 2.4* 2.1*   BILITOT 0.2 0.2   ALKPHOS 79 68   AST 21 21   ALT 19 19   ANIONGAP 7* 7*   EGFRNONAA >60 >60     8/10  vanc trough 6.4        Hepatitis screen negative   Rota virus negative   covid not detected   Rep panel negative   Flu negative        procal 0.25  Lactate 1.1  U/a + blood      stool ova cyst and parasites none noted, rota negative, WBC none, occult positive  Blood cultures NGTD      phenobarb 25.8   covid not detected   D dimer 1.89   CRP 58   Ferritin 173       procal 0.21   Lactic 1.3   Negative rapid covid   c diff, ecoli, stool culture negative    Strept flu and blood cuylture NGTD     Significant Imagin/14 CXR No acute abnormality     CT abd and pelvis Imaging findings suggestive for a colitis, most probably involving the rectosigmoid colon with inflammatory and infectious etiologies to be primarily considered.  Ischemia is felt unlikely.  Pseudomembranous colitis would also be consideration.  There is a small amount of free fluid in the  pelvis which is likely related to the inflammatory changes of the bowel.     Abnormal thickening of the walls of the urinary bladder which could suggest a cystitis.  Correlation with urinalysis recommended.     Subtle ground-glass opacity in the right lung base which could represent a developing inflammatory/infectious process or sequela of aspiration.    8/9 CXR No acute process    8/7 CXR subtle increased density in the right lung base likely relating to a developing infiltrate.  Left lung is clear.  Heart size is normal. Skeletal structures are intact.    8/5 CTA chest    1. No evidence of acute cardiopulmonary embolus.  2. Still a zone of increased density identified in the patient's posterior gutter on the left 12 mm in size may reflect a early infiltrate or suspicious pulmonary nodule formation.  On any matter, short-term interval follow-up within a 6 month interval to document stability imaging findings is advised based on the Fleischner criteria.  3. 6 mm cyst projecting in the posterior interpolar region of the left kidney.  4. Prominent distention of the esophagus with evidence of retention of fluid that may reflect findings as a manifestation of achalasia with scleroderma as there is of moderate tapering of the distal esophagus at the level of the gastroesophageal junction    8/4 CXR No acute abnormality    8/7 EKG Sinus tachycardia  Otherwise normal ECG  When compared with ECG of 05-AUG-2020 07:47,  Nonspecific T wave abnormality no longer evident in Anterior leads  Confirmed by ALVIN PRIDE MD ([id number]) on 8/7/2020 12:59:42 PM    8/10 Echo Normal left ventricular systolic function. The estimated ejection fraction is 60%.  · No wall motion abnormalities.  · Normal LV diastolic function.  Normal right ventricular systolic function      Assessment/Plan:      * Hypokalemia  Replace IV and via PEG today  Labs in AM  Likely due to severe diarrhea  telemetry  K+ better 2.2>3.3 after 5 >> 10meq IV riders in ER and  20meq po   Will repeat g tube this am 40meq po once this am    8/6: resolved. Stop K in fluids today. Still having diarrhea so will keep 1 more day and repeat labs in morning to be sure remains stable as this was his main reason for admission  8/7 K+ 4.2 .    Resolved    8/9  Replace via G tube. Worsening with diarrhea  8/10 change KCL to powder for PEG tube   8/11 replace , due to  GI loss with significant diarrhea   8/12 continues with some diarrhea now with blood noted, K+ better 3.6 ; now getting D5 1/2 with KCL   8/13 K+ 3.4- replace   8/14 K+ 2.9- replace     8/15 : resolved  BMP  Lab Results   Component Value Date     08/17/2020    K 3.1 (L) 08/17/2020     (H) 08/17/2020    CO2 15 (L) 08/17/2020    BUN <2 (L) 08/17/2020    CREATININE 0.6 08/17/2020    CALCIUM 7.5 (L) 08/17/2020    ANIONGAP 7 (L) 08/17/2020    ESTGFRAFRICA >60 08/17/2020    EGFRNONAA >60 08/17/2020 8/17 replace potassium today and resume peg feeds.      Colitis  If he continue with bloody diarrhea   May consider steroids : ? Ulcerative colitis       Decubitus ulcer of left buttock, stage 2    Moisture barrier oitment  Offload, turning q 2 .    Fever in adult  Not sure the cause of this.  In the last 2 months he has been innudated with antibiotics.  Cultures seem to always be negative.  Will get echo tomorrow.  Abx increased to cover mrsa. Addition of levaquin did not help with fevers, but wbc ct did improve. Will cont both for now.  Broaden work up.   If no obvious etiology will d/w id.  8/10 A little better with Vanc - no BM over night, afebrile this am   Echo pending,   8/11 Echo stable, now with recurrent diarrhea and fever, CT of abd and pelvis- if negative for source will need transfer for ID eval, GI eval   8/12 IV vanc change to PEG to Tx colitis demonstrated per CT scan, temp trending down finally   8/13 Tmax 99.4   8/14 AFEBRILE x 24 hrs   8/15  DC levaquin       RESOLVED .    Aspiration pneumonitis  Recently admitted to  [facility] with this diagnosis  Started on Vanc yesterday ? RLL pneumonia   Not really convinced of this. First xr with questionable right sided changes. Repeat cxr clear.  8/11 Day 4 levofloxacin, day 3 Vanc- continues with fever and diarrhea   8/12 Day 5 levoflxacin, afebrile; CT abd and pelvis yesterday noting Subtle ground-glass opacity in the right lung base which could represent a developing inflammatory/infectious process or sequela of aspiration.  8/13 Day 6 Levofloxacin 500mg per PEG for pneumonia - Right lung per CT     8/15/20  Day 7  Levofloxacin 500mg per PEG for pneumonia - Right lung per CT   DC today .    8/16/20  Resolved.    Leukocytosis  Differential broad  Low grade fevers  C. Diff possible  CXR clear  UA clear  Aspiration in differential    Ruled out PE with CTA  Hold antibx for now as lactic acid and procal is normal but low threshold to start if any decompensation overnight .    8/6: resolved without antibx treatment. Could be dehydration.     8/7  Slight bump again today. Start cipro flagyl and check cxr again.    8/9  Wbc ct improved today. But fevers still very high. Start on vanc (d/c levaquin) with recent hospital admission. Monitor fever curve.  8/10 WBC 9.42> 13.68, Resp infection panel  and flu swab negative, Blood Cx - NGTD x 2 d   vanc started - afebrile this am,   8/11 WBC 13.68>12.59 , but now having recurrent diarrhea and fever   8/12 WBC 12.59> 12.79, expect to improve with Tx for colitis    8/13 WBC 11.23 trending down finally   Lab Results   Component Value Date    WBC 12.21 08/17/2020    HGB 11.7 (L) 08/17/2020    HCT 35.3 (L) 08/17/2020    MCV 87 08/17/2020     (H) 08/17/2020         RESOLVED .    Diarrhea of presumed infectious origin  Antibiotic associate diarrhea vs true C. Diff  C. Diff pending  If worsening clinical status will start PO Vanc for treatment; holding tonight as true clinical picture not yet clear    8/6:  Check stool studies--pending. C. Diff  negative  Change NS 150cc/hr  8/7 Blood Cx negative, afebrile but WBC trending up; will add Cipro + flagyl and cont for 7 days  Stool for Ova/parasites   8/12 CT abd suggestive of colitis IV vanc changed to PER PEG , continue flagyl day 5/7 , Tmax 99.3 overnight     8/8  Stool studies all negative. Started on flagyl anyway. Will give him probiotics as well.    8/9  Add in rotavirus panel today. Continued on flagyl.    8/10  Maybe this is an occult c diff? Interestingly it improved with addition of IV vanc?    8/11  Day 4 levofloxacin, day 3 Vanc- continues with fever and diarrhea started up again , will need transfer for ID, GI    8/12 This is C diff colitis clinically although C diff test is negative.  Colitis on CT and he is responding to first 24 hours of Vanc per gastrostomy. No fever in over 24 hours. Will keep NPG until diarrhea improves as it is causing perianal skin breakdown. D5 1/2 NS with KCL for maintenance fluids    8/13 + Colitis, may have been C- diff despite negative stools, Has has multiple ABX over the past 2 months  Continue Vanc per PEG - day2 , day 6 flagyl. I would not resume PEG feeds until diarrhea clears up as it is causing skin breakdown     8/14 Vanc per PEG day 3, Day 7 flagyl, add lactobacillus.    8/15  Lactobacillus clogs PEG tube   Try questran .    8/17 diarrhea is better on questran, resume feeds today,  Cont vanc and flagyl for now.    Tachypnea  Repeat COVID ordered; rapid negative but + cough, diarrhea, mild LFT elevation  CXR without infiltrates  D-dimer elevated so checking CTA for PE tonight. Will start heparin gtt if CTA positive  No antibx for now with normal procal but aspiration in differential as well so could add Zosyn if needed  8/5: POX 96% on RA and RR down to 18/min. Maybe dehydration/hypoK played a role here    8/6: resolved. COVID negative. RR 18 from 30-40 on admit.   8/7 remains with - check EKG verify sinus tach .  8/8  CXR looks like he has a developing  infiltrate. Levaquin started today. Has fever, increased wbc ct and now infiltrate. Reorder blood culture.    8/9  Consulted pulm. Will get echo tomorrow and consult cards.   Resolved .    Sinus tachycardia  NS 10cc/hr  Likely some dehydration from frequent diarrhea (very low muscle mass so I trust Cr less)  D-dimer elevated so assessing for PE as well  CTA negative PE    Mom unsure his baseline heart rate.     Started on propranolol yesterday    8/9  With continued tachycardia and fever. Given single 500 cc bolus last night. Remains tachycardic. Worse with fevers.    8/10  Improved with propranolol and vanc.  8/11 Reviewed old progress notes ; BP documented but not HR- not sure of pts baseline   8/13 Improving with IVF and treatment of presumed cdiff colitis     8/14 RESOLVED- HR 80s now that he is afebrile with normal WBC    Cerebral palsy  Cont home phenobarbital and baclofen.  Cont tube feeds per home routine via PEG.  He is non-verbal and not mobile at baseline. He seems to be more interactive and grunting at TV today. Mom thinks this is his baseline        VTE Risk Mitigation (From admission, onward)         Ordered     IP VTE LOW RISK PATIENT  Once      08/04/20 7421                Discharge Planning   ROCÍO:      Code Status: Full Code   Is the patient medically ready for discharge?:     Reason for patient still in hospital (select all that apply): Treatment  Discharge Plan A: Home Health                  Marianela Lugo MD  Department of Hospital Medicine   [facility]

## 2020-08-17 NOTE — ASSESSMENT & PLAN NOTE
NS 10cc/hr  Likely some dehydration from frequent diarrhea (very low muscle mass so I trust Cr less)  D-dimer elevated so assessing for PE as well  CTA negative PE    Mom unsure his baseline heart rate.     Started on propranolol yesterday    8/9  With continued tachycardia and fever. Given single 500 cc bolus last night. Remains tachycardic. Worse with fevers.    8/10  Improved with propranolol and vanc.  8/11 Reviewed old progress notes ; BP documented but not HR- not sure of pts baseline   8/13 Improving with IVF and treatment of presumed cdiff colitis     8/14 RESOLVED- HR 80s now that he is afebrile with normal WBC

## 2020-08-17 NOTE — ASSESSMENT & PLAN NOTE
Cont home phenobarbital and baclofen.  Cont tube feeds per home routine via PEG.  He is non-verbal and not mobile at baseline. He seems to be more interactive and grunting at TV today. Mom thinks this is his baseline

## 2020-08-17 NOTE — ASSESSMENT & PLAN NOTE
Recently admitted to [place] with this diagnosis  Started on Vanc yesterday ? RLL pneumonia   Not really convinced of this. First xr with questionable right sided changes. Repeat cxr clear.  8/11 Day 4 levofloxacin, day 3 Vanc- continues with fever and diarrhea   8/12 Day 5 levoflxacin, afebrile; CT abd and pelvis yesterday noting Subtle ground-glass opacity in the right lung base which could represent a developing inflammatory/infectious process or sequela of aspiration.  8/13 Day 6 Levofloxacin 500mg per PEG for pneumonia - Right lung per CT     8/15/20  Day 7  Levofloxacin 500mg per PEG for pneumonia - Right lung per CT   DC today .    8/16/20  Resolved.

## 2020-08-17 NOTE — ASSESSMENT & PLAN NOTE
Replace IV and via PEG today  Labs in AM  Likely due to severe diarrhea  telemetry  K+ better 2.2>3.3 after 5 >> 10meq IV riders in ER and 20meq po   Will repeat g tube this am 40meq po once this am    8/6: resolved. Stop K in fluids today. Still having diarrhea so will keep 1 more day and repeat labs in morning to be sure remains stable as this was his main reason for admission  8/7 K+ 4.2 .    Resolved    8/9  Replace via G tube. Worsening with diarrhea  8/10 change KCL to powder for PEG tube   8/11 replace , due to  GI loss with significant diarrhea   8/12 continues with some diarrhea now with blood noted, K+ better 3.6 ; now getting D5 1/2 with KCL   8/13 K+ 3.4- replace   8/14 K+ 2.9- replace     8/15 : resolved  BMP  Lab Results   Component Value Date     08/17/2020    K 3.1 (L) 08/17/2020     (H) 08/17/2020    CO2 15 (L) 08/17/2020    BUN <2 (L) 08/17/2020    CREATININE 0.6 08/17/2020    CALCIUM 7.5 (L) 08/17/2020    ANIONGAP 7 (L) 08/17/2020    ESTGFRAFRICA >60 08/17/2020    EGFRNONAA >60 08/17/2020 8/17 replace potassium today and resume peg feeds.

## 2020-08-17 NOTE — SUBJECTIVE & OBJECTIVE
Review of Systems   Unable to perform ROS: Patient nonverbal     Objective:     Vital Signs (Most Recent):  Temp: 96.4 °F (35.8 °C) (08/17/20 0810)  Pulse: 98 (08/17/20 0837)  Resp: 18 (08/17/20 0810)  BP: 109/72 (08/17/20 0810)  SpO2: 97 % (08/17/20 0812) Vital Signs (24h Range):  Temp:  [96.4 °F (35.8 °C)-99.7 °F (37.6 °C)] 96.4 °F (35.8 °C)  Pulse:  [68-99] 98  Resp:  [18-20] 18  SpO2:  [95 %-100 %] 97 %  BP: (106-121)/(63-78) 109/72     Weight: 35.4 kg (78 lb)  Body mass index is 16.88 kg/m².    Intake/Output Summary (Last 24 hours) at 8/17/2020 0939  Last data filed at 8/17/2020 0800  Gross per 24 hour   Intake 1400 ml   Output --   Net 1400 ml      Physical Exam  Vitals signs and nursing note reviewed.   Constitutional:       Appearance: He is well-developed. He is ill-appearing and toxic-appearing.   HENT:      Head: Normocephalic and atraumatic.      Right Ear: External ear normal.      Left Ear: External ear normal.      Nose: Nose normal.   Eyes:      General:         Right eye: No discharge.         Left eye: No discharge.      Conjunctiva/sclera: Conjunctivae normal.      Pupils: Pupils are equal, round, and reactive to light.      Comments: Strabismus right eye   Neck:      Musculoskeletal: Neck supple.      Thyroid: No thyromegaly.   Cardiovascular:      Rate and Rhythm: Normal rate and regular rhythm.      Heart sounds: No murmur. No friction rub. No gallop.       Comments: Tachycardia, regular  Pulmonary:      Effort: Pulmonary effort is normal. No respiratory distress.      Breath sounds: Normal breath sounds. No wheezing or rales.   Abdominal:      General: Abdomen is flat. Bowel sounds are normal. There is no distension.      Palpations: Abdomen is soft.      Tenderness: There is no abdominal tenderness.      Comments: PEG in place with mild redness, no drainage    Foul smelling stool   Musculoskeletal:      Right lower leg: No edema.      Left lower leg: No edema.      Comments: Muscle  atrophy  contractures   Lymphadenopathy:      Cervical: No cervical adenopathy.   Skin:     General: Skin is warm and dry.             Comments: Scaly rash : ? Related to leak from peg tube ? Contact dermatitis    Neurological:      Mental Status: He is alert.      Comments: Patient alert but otherwise not responsive and can not cooperate in exam, severe CP   Psychiatric:      Comments: Uneasy in bed. Rotating around         Significant Labs:   CBC:   Recent Labs   Lab 20  0621 20  0641   WBC 10.64 12.21   HGB 10.9* 11.7*   HCT 33.6* 35.3*   * 528*     CMP:   Recent Labs   Lab 20  0621 20  0641    136   K 3.9 3.1*   * 114*   CO2 17* 15*   GLU 89 111*   BUN <2* <2*   CREATININE 0.7 0.6   CALCIUM 8.0* 7.5*   PROT 6.3 5.3*   ALBUMIN 2.4* 2.1*   BILITOT 0.2 0.2   ALKPHOS 79 68   AST 21 21   ALT 19 19   ANIONGAP 7* 7*   EGFRNONAA >60 >60     8/10  vanc trough 6.4        Hepatitis screen negative   Rota virus negative   covid not detected   Rep panel negative   Flu negative        procal 0.25  Lactate 1.1  U/a + blood      stool ova cyst and parasites none noted, rota negative, WBC none, occult positive  Blood cultures NGTD      phenobarb 25.8   covid not detected   D dimer 1.89   CRP 58   Ferritin 173       procal 0.21   Lactic 1.3   Negative rapid covid   c diff, ecoli, stool culture negative    Strept flu and blood cuylture NGTD     Significant Imagin/14 CXR No acute abnormality     CT abd and pelvis Imaging findings suggestive for a colitis, most probably involving the rectosigmoid colon with inflammatory and infectious etiologies to be primarily considered.  Ischemia is felt unlikely.  Pseudomembranous colitis would also be consideration.  There is a small amount of free fluid in the pelvis which is likely related to the inflammatory changes of the bowel.     Abnormal thickening of the walls of the urinary bladder which could suggest a cystitis.   Correlation with urinalysis recommended.     Subtle ground-glass opacity in the right lung base which could represent a developing inflammatory/infectious process or sequela of aspiration.    8/9 CXR No acute process    8/7 CXR subtle increased density in the right lung base likely relating to a developing infiltrate.  Left lung is clear.  Heart size is normal. Skeletal structures are intact.    8/5 CTA chest    1. No evidence of acute cardiopulmonary embolus.  2. Still a zone of increased density identified in the patient's posterior gutter on the left 12 mm in size may reflect a early infiltrate or suspicious pulmonary nodule formation.  On any matter, short-term interval follow-up within a 6 month interval to document stability imaging findings is advised based on the Fleischner criteria.  3. 6 mm cyst projecting in the posterior interpolar region of the left kidney.  4. Prominent distention of the esophagus with evidence of retention of fluid that may reflect findings as a manifestation of achalasia with scleroderma as there is of moderate tapering of the distal esophagus at the level of the gastroesophageal junction    8/4 CXR No acute abnormality    8/7 EKG Sinus tachycardia  Otherwise normal ECG  When compared with ECG of 05-AUG-2020 07:47,  Nonspecific T wave abnormality no longer evident in Anterior leads  Confirmed by ALVIN PRIDE MD ([id number]) on 8/7/2020 12:59:42 PM    8/10 Echo Normal left ventricular systolic function. The estimated ejection fraction is 60%.  · No wall motion abnormalities.  · Normal LV diastolic function.  Normal right ventricular systolic function

## 2020-08-17 NOTE — ASSESSMENT & PLAN NOTE
Repeat COVID ordered; rapid negative but + cough, diarrhea, mild LFT elevation  CXR without infiltrates  D-dimer elevated so checking CTA for PE tonight. Will start heparin gtt if CTA positive  No antibx for now with normal procal but aspiration in differential as well so could add Zosyn if needed  8/5: POX 96% on RA and RR down to 18/min. Maybe dehydration/hypoK played a role here    8/6: resolved. COVID negative. RR 18 from 30-40 on admit.   8/7 remains with - check EKG verify sinus tach .  8/8  CXR looks like he has a developing infiltrate. Levaquin started today. Has fever, increased wbc ct and now infiltrate. Reorder blood culture.    8/9  Consulted pulm. Will get echo tomorrow and consult cards.   Resolved .

## 2020-08-17 NOTE — PROGRESS NOTES
"[facility]  Adult Nutrition  Progress Note     SUMMARY         Recommendations     Recommendation:   1. Continue Isosource 1.5 Bolus Feeds as appropriate @ 4 cans/day to provide 1500 kcals, 68 g pro, and 764 mL enteral fluid      A. Fluid flush of 160 mL q6hrs or per MD   2. RD to monitor     Interventions:  Enteral nutrition  Collaboration with other providers     Goals: meet >85% of needs by discharge  Nutrition Goal Status: progressing towards goal  Communication of RD Recs: (POC)     Reason for Assessment     Reason For Assessment: RD follow-up  Diagnosis: (Hypokalemia)  Relevant Medical History: acid reflux, cerebral palsy, PEG tube, seizures     General Information Comments: Diarrhea continues but is improving since pt was placed on questran. Feedings have been on hold and was restarted today. Due to recent hospital wide restrictions to limit the transfer of (COVID-19), we are not performing any physical exams at this time. All S/S will be observational; NFPE to be performed at a future date.     Nutrition Discharge Planning: nutrition through PEG     Nutrition Risk Screen     Nutrition Risk Screen: no indicators present     Nutrition/Diet History     Patient Reported Diet/Restrictions/Preferences: no oral intake  Food Allergies: NKFA  Factors Affecting Nutritional Intake: NPO  Nutrition Support Formula Prior to Admit: Other (see comments)(Ensure)  Nutrtion Support Frequency Prior to Admit: 4 cartons/day     Anthropometrics     Temp: 98.3 °F (36.8 °C)  Height: 4' 9" (144.8 cm)  Height (inches): 57 in  Weight Method: Bed Scale  Weight: 35.4 kg (78 lb)  Weight (lb): 78 lb  Ideal Body Weight (IBW), Male: 88 lb  % Ideal Body Weight, Male (lb): 88.64 %  BMI (Calculated): 16.9  BMI Grade: 16 - 16.9 protein-energy malnutrition grade II     Lab/Procedures/Meds     Pertinent Labs Reviewed: reviewed  Pertinent Labs Comments: potassium 3.1, BUN <2, glucose 111, protein total 5.3, albumin 2.1Pertinent " Medications Reviewed: reviewed  Pertinent Medications Comments: potassium chloride packet, vancomycin     Physical Findings/Assessment     stage I noted to left lower buttocks     Estimated/Assessed Needs     Weight Used For Calorie Calculations: 35.4 kg (78 lb)  Energy Calorie Requirements (kcal): 1400  Energy Need Method: [place]-St Jeor(*1.2)  Protein Requirements: 35-42 g(1-1.2 g/kg)  Weight Used For Protein Calculations: 35.4 kg (78 lb)     Estimated Fluid Requirement Method: RDA Method  RDA Method (mL): 1400     Nutrition Prescription Ordered     Current Diet Order: NPO  Current Nutrition Support Formula Ordered: Isosource 1.5  Current Nutrition Support Frequency Ordered: 4 cans/d     Evaluation of Received Nutrient/Fluid Intake     Enteral Calories (kcal): 1500  Enteral Protein (gm): 68  I/O: +28.7 L since admit  Energy Calories Required: meeting needs  Protein Required: meeting needs  Fluid Required: meeting needs  Comments: LBM 8/16  Tolerance: tolerating     Nutrition Risk     Level of Risk/Frequency of Follow-up: moderate(1-2x/wk)      Assessment and Plan  Nutrition Problem:  Inadequate oral intake     Related to (etiology):   Cerebral palsy     Signs and Symptoms (as evidenced by):   Nutrition through PEG     Interventions:  Enteral nutrition  Collaboration with other providers     Nutrition Diagnosis Status:   Continues     Monitor and Evaluation     Food and Nutrient Intake: enteral nutrition intake, energy intake  Food and Nutrient Administration: enteral and parenteral nutrition administration  Anthropometric Measurements: weight, weight change  Biochemical Data, Medical Tests and Procedures: electrolyte and renal panel, gastrointestinal profile, glucose/endocrine profile, lipid profile, inflammatory profile  Nutrition-Focused Physical Findings: overall appearance      Malnutrition Assessment  Due to recent hospital wide restrictions to limit the transfer of (COVID-19), we are not performing any  physical exams at this time. All S/S will be observational; NFPE to be performed at a future date.      Nutrition Follow-Up     RD Follow-up?: Yes

## 2020-08-17 NOTE — PLAN OF CARE
Potential discharge tomorrow discussed with patient's Mother. She plans to take Glen home with her and resume home health with [facility].

## 2020-08-18 VITALS
HEART RATE: 97 BPM | TEMPERATURE: 99 F | RESPIRATION RATE: 18 BRPM | WEIGHT: 78 LBS | BODY MASS INDEX: 15.31 KG/M2 | DIASTOLIC BLOOD PRESSURE: 55 MMHG | SYSTOLIC BLOOD PRESSURE: 96 MMHG | HEIGHT: 60 IN | OXYGEN SATURATION: 100 %

## 2020-08-18 LAB
ALBUMIN SERPL BCP-MCNC: 2.5 G/DL (ref 3.5–5.2)
ALP SERPL-CCNC: 79 U/L (ref 55–135)
ALT SERPL W/O P-5'-P-CCNC: 20 U/L (ref 10–44)
ANION GAP SERPL CALC-SCNC: 12 MMOL/L (ref 8–16)
AST SERPL-CCNC: 19 U/L (ref 10–40)
BASOPHILS # BLD AUTO: ABNORMAL K/UL (ref 0–0.2)
BASOPHILS NFR BLD: 0 % (ref 0–1.9)
BILIRUB SERPL-MCNC: 0.3 MG/DL (ref 0.1–1)
BUN SERPL-MCNC: 4 MG/DL (ref 6–20)
CALCIUM SERPL-MCNC: 8.1 MG/DL (ref 8.7–10.5)
CHLORIDE SERPL-SCNC: 112 MMOL/L (ref 95–110)
CO2 SERPL-SCNC: 15 MMOL/L (ref 23–29)
CREAT SERPL-MCNC: 0.7 MG/DL (ref 0.5–1.4)
DIFFERENTIAL METHOD: ABNORMAL
EOSINOPHIL # BLD AUTO: ABNORMAL K/UL (ref 0–0.5)
EOSINOPHIL NFR BLD: 10 % (ref 0–8)
ERYTHROCYTE [DISTWIDTH] IN BLOOD BY AUTOMATED COUNT: 16.2 % (ref 11.5–14.5)
EST. GFR  (AFRICAN AMERICAN): >60 ML/MIN/1.73 M^2
EST. GFR  (NON AFRICAN AMERICAN): >60 ML/MIN/1.73 M^2
GLUCOSE SERPL-MCNC: 86 MG/DL (ref 70–110)
HCT VFR BLD AUTO: 37.5 % (ref 40–54)
HGB BLD-MCNC: 12.4 G/DL (ref 14–18)
IMM GRANULOCYTES # BLD AUTO: ABNORMAL K/UL (ref 0–0.04)
IMM GRANULOCYTES NFR BLD AUTO: ABNORMAL % (ref 0–0.5)
LYMPHOCYTES # BLD AUTO: ABNORMAL K/UL (ref 1–4.8)
LYMPHOCYTES NFR BLD: 11 % (ref 18–48)
MCH RBC QN AUTO: 29.5 PG (ref 27–31)
MCHC RBC AUTO-ENTMCNC: 33.1 G/DL (ref 32–36)
MCV RBC AUTO: 89 FL (ref 82–98)
MONOCYTES # BLD AUTO: ABNORMAL K/UL (ref 0.3–1)
MONOCYTES NFR BLD: 11 % (ref 4–15)
NEUTROPHILS NFR BLD: 34 % (ref 38–73)
NEUTS BAND NFR BLD MANUAL: 34 %
NRBC BLD-RTO: 0 /100 WBC
PLATELET # BLD AUTO: 571 K/UL (ref 150–350)
PLATELET BLD QL SMEAR: ABNORMAL
PMV BLD AUTO: 9.2 FL (ref 9.2–12.9)
POCT GLUCOSE: 83 MG/DL (ref 70–110)
POCT GLUCOSE: 85 MG/DL (ref 70–110)
POTASSIUM SERPL-SCNC: 3.5 MMOL/L (ref 3.5–5.1)
PROT SERPL-MCNC: 6.2 G/DL (ref 6–8.4)
RBC # BLD AUTO: 4.21 M/UL (ref 4.6–6.2)
SODIUM SERPL-SCNC: 139 MMOL/L (ref 136–145)
WBC # BLD AUTO: 13.9 K/UL (ref 3.9–12.7)

## 2020-08-18 PROCEDURE — 80053 COMPREHEN METABOLIC PANEL: CPT

## 2020-08-18 PROCEDURE — 25000003 PHARM REV CODE 250: Performed by: INTERNAL MEDICINE

## 2020-08-18 PROCEDURE — 25000003 PHARM REV CODE 250: Performed by: FAMILY MEDICINE

## 2020-08-18 PROCEDURE — 36415 COLL VENOUS BLD VENIPUNCTURE: CPT

## 2020-08-18 PROCEDURE — 25000003 PHARM REV CODE 250: Performed by: NURSE PRACTITIONER

## 2020-08-18 PROCEDURE — 99238 HOSP IP/OBS DSCHRG MGMT 30/<: CPT | Mod: ,,, | Performed by: FAMILY MEDICINE

## 2020-08-18 PROCEDURE — 85027 COMPLETE CBC AUTOMATED: CPT

## 2020-08-18 PROCEDURE — 85007 BL SMEAR W/DIFF WBC COUNT: CPT

## 2020-08-18 PROCEDURE — 99238 PR HOSPITAL DISCHARGE DAY,<30 MIN: ICD-10-PCS | Mod: ,,, | Performed by: FAMILY MEDICINE

## 2020-08-18 RX ORDER — PROPRANOLOL HYDROCHLORIDE 20 MG/1
20 TABLET ORAL 3 TIMES DAILY
Qty: 90 TABLET | Refills: 0 | Status: ON HOLD | OUTPATIENT
Start: 2020-08-18 | End: 2020-09-15 | Stop reason: SDUPTHER

## 2020-08-18 RX ORDER — PHENOBARBITAL 20 MG/5ML
100 ELIXIR ORAL NIGHTLY
Qty: 750 ML | Refills: 0 | Status: ON HOLD | OUTPATIENT
Start: 2020-08-18 | End: 2020-09-15 | Stop reason: SDUPTHER

## 2020-08-18 RX ORDER — CHOLESTYRAMINE 4 G/9G
1 POWDER, FOR SUSPENSION ORAL 2 TIMES DAILY
Qty: 60 PACKET | Refills: 0 | Status: ON HOLD | OUTPATIENT
Start: 2020-08-18 | End: 2020-10-01 | Stop reason: HOSPADM

## 2020-08-18 RX ORDER — PHENOBARBITAL 20 MG/5ML
100 ELIXIR ORAL NIGHTLY
Qty: 750 ML | Refills: 0 | Status: SHIPPED | OUTPATIENT
Start: 2020-08-18 | End: 2020-08-18

## 2020-08-18 RX ADMIN — METRONIDAZOLE 500 MG: 500 TABLET ORAL at 06:08

## 2020-08-18 RX ADMIN — CHOLESTYRAMINE 4 G: 4 POWDER, FOR SUSPENSION ORAL at 07:08

## 2020-08-18 RX ADMIN — VANCOMYCIN HYDROCHLORIDE 125 MG: KIT at 06:08

## 2020-08-18 RX ADMIN — VANCOMYCIN HYDROCHLORIDE 125 MG: KIT at 12:08

## 2020-08-18 RX ADMIN — MICONAZOLE NITRATE: 20 OINTMENT TOPICAL at 07:08

## 2020-08-18 RX ADMIN — TRIAMCINOLONE ACETONIDE: 1 CREAM TOPICAL at 07:08

## 2020-08-18 RX ADMIN — BACLOFEN 10 MG: 10 TABLET ORAL at 07:08

## 2020-08-18 RX ADMIN — PROPRANOLOL HYDROCHLORIDE 20 MG: 20 TABLET ORAL at 07:08

## 2020-08-18 RX ADMIN — VANCOMYCIN HYDROCHLORIDE 125 MG: KIT at 11:08

## 2020-08-18 NOTE — PROGRESS NOTES
"[facility] Medicine  Progress Note    Patient Name: Glen Moscoso  MRN: [id number]  Patient Class: IP- Inpatient   Admission Date: 8/4/2020  Length of Stay: 13 days  Attending Physician: Anjelica Brandt MD  Primary Care Provider: Yadi Lawson MD        Subjective:     Principal Problem:Hypokalemia        HPI:  Patient presented to ER with fever and cough. Patient has cerebral palsy and is non-verbal. Mother provides history. He was seen at [facility] ER 2 weeks ago and diagnosed with bronchitis and strep throat. Treated with amoxil. He completed antibx 4 days ago. Mom reports loose watery stools 3-4x a day for almost 2 weeks now. She noted he had fever yesterday "He was burning up" but did not check with thermometer. She notes he is breathing a little heavier than normal. + cough. She does not think he is in any pain. No changes to tube feeds (Ensure 4x a day via PEG). No sick contacts or recent travel but was in [facility] ER and was hospitalized at [place] before that for "aspiration" and on antibx then. Patient has low grade temp 99.5. HR 120s. RR 30s. Sats 96% on RA. Rapid COVID negative however note mild elevation LFTs, LDH, ferritin pending. D-dimer elevated, CTA pending. Lactic acid and procalcitonin normal. Repeat COVID swab ordered. WBC 15K. UA not infectious. K 2.2--likely due to diarrhea. GFR > 60 (Cr 0.7 but very low muscle mass). CXR clear. Flu and strep negative. Admitted for treatment of hypokalemia and possible C. Diff vs COVID vs PE.     Overview/Hospital Course:  CTA done this am negative for PE. POX 96% RR 18. He is still tachycardiac. With low grade fever. And diarrhea persist. K+ is better after 5-10meq KCL riders and 20meq via g tube.     8/6: COVID negative. Still having diarrhea. Still tachycardia (rate 120s and regular); mom doesn't know if this is his baseline. RR back to normal. He is more alert and interactive with TV. K is normal. Stool studies still pending. C. Diff " negative.     8/7/2020 Blood Cx negative x 3d- but WBC trending up WBC 12.08> 14.45, will add Cipro , repeat CXR   Patient had 3 loose stools last shift/voiding per diaper. Stool was sent to lab for ova and parasites as ordered, c-diff negative    K+ 4.2   Getting IVFs @ 150ml/hr , HR Remains 114 sinus tach    8/8  Fever overnight. More agitated. HR up to 150s. Given propranolol and HR down to 100s. Less agitated. Slept okay. Still with foul smelling stool.    8/9  Switched from cipro to levaquin to cover for potential right lower lobe developing infiltrate. However, last night, he had higher fever. Discussed at length with mom that he has been on >10 rounds of different antibiotics over the last 2 months. Not always with a definitive diagnosis. Currently, he is still having foul smelling abnormally colored stools. He is non verbal and does not grimace with movement of his arms or legs or head. He also is unphased by abdominal exam.    8/10/2020 Tmax 101.9 , FUO, ? Infectious diarrhea tx ; cipro changed to Levofloxacin day 3 to cover for ? RLL pneumonia - but cxr not convincing,   Still getting flagyl. Switched to vanc and since, no fever, no diarrhea. Long discussion regarding ~11 abx in the past 2 months, has never been on Vancomycin  Did total body exam to rule out possible skin soft tissue etiology;   ? Echo pending;   BP stable, -116 , afebrile this am so some improvement with Vanc-   Resp infection panel  and flu swab negative, Blood Cx - NGTD x 2 d , repeat Covid negative, C- diff negative     8/11/2020 Tmax 103, 100.7 this am , multiple watery stools overnight, started yesterday afternoon and now has stage 2 buttock wound  Echo with normal EF60%, no mention of valvular abn but  Overall the study quality was poor. The study was difficult due to patient's uncooperativeness, clinical status and poor endocardial visualization.   He gets ensure plus feedings  at home and getting Isosource here; could be  source of diarrhea ? But has fever also   Getting Vanc and flagyl ; day 3 vanc, day 4 levofloxacin/flagyl   Multiple stool negative for rotavirus, O&P negative   Abdomen is non tender but - Will CT abd and pelvis; if negative then will need transfer for ID/GI   This was discussed with mother     8/12 CT of abd pelivs yesterday suggestive of colitis and Subtle ground-glass opacity in the right lung base which could represent a developing inflammatory/infectious process or sequela of aspiration.   Started on Vanc per Peg tube, flagyl day 5 , Tmax 99.3 , did have some blood stools , tube feeding on hold , will resume in a few days  Now getting IV D5 1/2 with KCL 20meq   day 5  Levofloxacin for pneumonia ,   Turning q2 for buttocks with breakdown from recurrent diarrhea, moisture barrier applied   Noted to have low BP early this am; given 250ml IVF bolus , BP currently 118/70,  , o2 sat stable on RA    8/13 Day 2 of Vanc per peg , day 6 flagyl for colitis,  Day 6 Levofloxacin per PEG for Pneumonia  Tmax 99.4,  WBC finally trending down   Hold Peg feedings, getting IVFs , still having lots of diarrhea but getting better     8/14/20   Glen Moscoso is a 24 y.o. male  Who has cerebral palsy and is non-verbal.contractures ;s/p PEG tube .     Day 3 /10 Vanc per PEG, day 7/10 flagyl for colitis,  Day 7/ Levofloxacin per PEG for Pneumonia- will get CXR if improved can stop levofloxacin ( finished 7 days )  Now afebrile and normal WBC but, Still having mucoid/bloody diarrhea, K+ 3.6>3.4>2.9 ;   Getting D51/2 with KCL, Tube feedings on hold .    Add lactobacillus   Condition discussed with mother- possible d/c on Monday       8/15/20  CXR : clear lungs   DC Levaquin   Afebrile but diarrhea persists   Rash abdomen ; will try triamcinolone     8/16/20  Afebrile ;  colitis being treated with vancomycin  And flagyl .  Cholestyramine added yesterday .     But diarrhea with blood continues  Rash in buttocks     8/17/20  He had 2  BM over last 24hr. No bloody. Feeds have also been on hold. Will resume today. Low grade fever 99.7. No elevated WBC. K 3.1 today  Hoping for no diarrhea ; DC home in am     8/18 He has resumed feeds yesterday. Had 3 BM after isosource but when given ensure plus which is his usual feed and only had 2 BM. None were bloody. We do not have ensure on formulary here. K is stable this am. He is stable and his mother is ready for d/c    Review of Systems   Unable to perform ROS: Patient nonverbal   Cardiovascular: Palpitations: PEG feedings on going but no more blood y stools.   Gastrointestinal: Positive for diarrhea.   Review of Systems   Unable to perform ROS: Patient nonverbal     Objective:     Vital Signs (Most Recent):  Temp: 98.9 °F (37.2 °C) (08/18/20 0803)  Pulse: (!) 116 (08/18/20 0803)  Resp: 18 (08/18/20 0803)  BP: (!) 102/56 (08/18/20 0803)  SpO2: (!) 94 % (08/18/20 0803) Vital Signs (24h Range):  Temp:  [97.1 °F (36.2 °C)-99.4 °F (37.4 °C)] 98.9 °F (37.2 °C)  Pulse:  [] 116  Resp:  [18-20] 18  SpO2:  [93 %-98 %] 94 %  BP: ()/(51-68) 102/56     Weight: 35.4 kg (78 lb)  Body mass index is 16.88 kg/m².    Intake/Output Summary (Last 24 hours) at 8/18/2020 0913  Last data filed at 8/17/2020 1800  Gross per 24 hour   Intake 670 ml   Output --   Net 670 ml      Physical Exam  Vitals signs and nursing note reviewed.   Constitutional:       Appearance: He is well-developed.   HENT:      Head: Normocephalic and atraumatic.      Right Ear: External ear normal.      Left Ear: External ear normal.      Nose: Nose normal.   Eyes:      General:         Right eye: No discharge.         Left eye: No discharge.      Conjunctiva/sclera: Conjunctivae normal.      Pupils: Pupils are equal, round, and reactive to light.      Comments: Strabismus right eye   Neck:      Musculoskeletal: Neck supple.      Thyroid: No thyromegaly.   Cardiovascular:      Rate and Rhythm: Normal rate and regular rhythm.      Heart sounds:  No murmur. No friction rub. No gallop.       Comments: Tachycardia, regular  Pulmonary:      Effort: Pulmonary effort is normal. No respiratory distress.      Breath sounds: Normal breath sounds. No wheezing or rales.   Abdominal:      General: Abdomen is flat. Bowel sounds are normal. There is no distension.      Palpations: Abdomen is soft.      Tenderness: There is no abdominal tenderness.      Comments: PEG in place with mild redness, no drainage    Foul smelling stool   Musculoskeletal:      Right lower leg: No edema.      Left lower leg: No edema.      Comments: Muscle atrophy  contractures   Lymphadenopathy:      Cervical: No cervical adenopathy.   Skin:     General: Skin is warm and dry.             Comments: Scaly rash : ? Related to leak from peg tube ? Contact dermatitis    Neurological:      Mental Status: He is alert.      Comments: Patient alert but otherwise not responsive and can not cooperate in exam, severe CP   Psychiatric:      Comments: Uneasy in bed. Rotating around         Significant Labs:   CBC:   Recent Labs   Lab 08/17/20  0641 08/18/20  0704   WBC 12.21 13.90*   HGB 11.7* 12.4*   HCT 35.3* 37.5*   * 571*     CMP:   Recent Labs   Lab 08/17/20  0641 08/18/20  0704    139   K 3.1* 3.5   * 112*   CO2 15* 15*   * 86   BUN <2* 4*   CREATININE 0.6 0.7   CALCIUM 7.5* 8.1*   PROT 5.3* 6.2   ALBUMIN 2.1* 2.5*   BILITOT 0.2 0.3   ALKPHOS 68 79   AST 21 19   ALT 19 20   ANIONGAP 7* 12   EGFRNONAA >60 >60     8/10  vanc trough 6.4     8/9   Hepatitis screen negative   Rota virus negative   covid not detected   Rep panel negative   Flu negative     8/8   procal 0.25  Lactate 1.1  U/a + blood     8/7 stool ova cyst and parasites none noted, rota negative, WBC none, occult positive  Blood cultures NGTD     8/4 phenobarb 25.8   covid not detected   D dimer 1.89   CRP 58   Ferritin 173       procal 0.21   Lactic 1.3   Negative rapid covid   c diff, ecoli, stool culture  negative    Strept flu and blood cuylture NGTD     Significant Imagin/14 CXR No acute abnormality     CT abd and pelvis Imaging findings suggestive for a colitis, most probably involving the rectosigmoid colon with inflammatory and infectious etiologies to be primarily considered.  Ischemia is felt unlikely.  Pseudomembranous colitis would also be consideration.  There is a small amount of free fluid in the pelvis which is likely related to the inflammatory changes of the bowel.     Abnormal thickening of the walls of the urinary bladder which could suggest a cystitis.  Correlation with urinalysis recommended.     Subtle ground-glass opacity in the right lung base which could represent a developing inflammatory/infectious process or sequela of aspiration.     CXR No acute process     CXR subtle increased density in the right lung base likely relating to a developing infiltrate.  Left lung is clear.  Heart size is normal. Skeletal structures are intact.     CTA chest    1. No evidence of acute cardiopulmonary embolus.  2. Still a zone of increased density identified in the patient's posterior gutter on the left 12 mm in size may reflect a early infiltrate or suspicious pulmonary nodule formation.  On any matter, short-term interval follow-up within a 6 month interval to document stability imaging findings is advised based on the Fleischner criteria.  3. 6 mm cyst projecting in the posterior interpolar region of the left kidney.  4. Prominent distention of the esophagus with evidence of retention of fluid that may reflect findings as a manifestation of achalasia with scleroderma as there is of moderate tapering of the distal esophagus at the level of the gastroesophageal junction     CXR No acute abnormality     EKG Sinus tachycardia  Otherwise normal ECG  When compared with ECG of 05-AUG-2020 07:47,  Nonspecific T wave abnormality no longer evident in Anterior leads  Confirmed by BIGG ESCOTO,  ALVIN ([id number]) on 8/7/2020 12:59:42 PM    8/10 Echo Normal left ventricular systolic function. The estimated ejection fraction is 60%.  · No wall motion abnormalities.  · Normal LV diastolic function.  Normal right ventricular systolic function  Objective:     Vital Signs (Most Recent):  Temp: 98.9 °F (37.2 °C) (08/18/20 0803)  Pulse: 101 (08/18/20 1000)  Resp: 18 (08/18/20 0803)  BP: (!) 102/56 (08/18/20 0803)  SpO2: (!) 94 % (08/18/20 0803) Vital Signs (24h Range):  Temp:  [97.1 °F (36.2 °C)-99.4 °F (37.4 °C)] 98.9 °F (37.2 °C)  Pulse:  [] 101  Resp:  [18-20] 18  SpO2:  [93 %-98 %] 94 %  BP: ()/(51-68) 102/56     Weight: 35.4 kg (78 lb)  Body mass index is 16.88 kg/m².    Intake/Output Summary (Last 24 hours) at 8/18/2020 1045  Last data filed at 8/18/2020 0800  Gross per 24 hour   Intake 360 ml   Output --   Net 360 ml      Physical Exam  Vitals signs and nursing note reviewed.   Constitutional:       Appearance: He is well-developed.   HENT:      Head: Normocephalic and atraumatic.      Right Ear: External ear normal.      Left Ear: External ear normal.      Nose: Nose normal.   Eyes:      General:         Right eye: No discharge.         Left eye: No discharge.      Conjunctiva/sclera: Conjunctivae normal.      Pupils: Pupils are equal, round, and reactive to light.      Comments: Strabismus right eye   Neck:      Musculoskeletal: Neck supple.      Thyroid: No thyromegaly.   Cardiovascular:      Rate and Rhythm: Normal rate and regular rhythm.      Heart sounds: No murmur. No friction rub. No gallop.       Comments: Tachycardia, regular  Pulmonary:      Effort: Pulmonary effort is normal. No respiratory distress.      Breath sounds: Normal breath sounds. No wheezing or rales.   Abdominal:      General: Abdomen is flat. Bowel sounds are normal. There is no distension.      Palpations: Abdomen is soft.      Tenderness: There is no abdominal tenderness.      Comments: PEG in place with mild redness, no  drainage    Foul smelling stool   Musculoskeletal:      Right lower leg: No edema.      Left lower leg: No edema.      Comments: Muscle atrophy  contractures   Lymphadenopathy:      Cervical: No cervical adenopathy.   Skin:     General: Skin is warm and dry.             Comments: Scaly rash : ? Related to leak from peg tube ? Contact dermatitis    Neurological:      Mental Status: He is alert.      Comments: Patient alert but otherwise not responsive and can not cooperate in exam, severe CP   Psychiatric:      Comments: Uneasy in bed. Rotating around         Significant Labs:   CBC:   Recent Labs   Lab 20  0641 20  0704   WBC 12.21 13.90*   HGB 11.7* 12.4*   HCT 35.3* 37.5*   * 571*     CMP:   Recent Labs   Lab 20  0641 20  0704    139   K 3.1* 3.5   * 112*   CO2 15* 15*   * 86   BUN <2* 4*   CREATININE 0.6 0.7   CALCIUM 7.5* 8.1*   PROT 5.3* 6.2   ALBUMIN 2.1* 2.5*   BILITOT 0.2 0.3   ALKPHOS 68 79   AST 21 19   ALT 19 20   ANIONGAP 7* 12   EGFRNONAA >60 >60     8/10  vanc trough 6.4        Hepatitis screen negative   Rota virus negative   covid not detected   Rep panel negative   Flu negative        procal 0.25  Lactate 1.1  U/a + blood      stool ova cyst and parasites none noted, rota negative, WBC none, occult positive  Blood cultures NGTD      phenobarb 25.8   covid not detected   D dimer 1.89   CRP 58   Ferritin 173       procal 0.21   Lactic 1.3   Negative rapid covid   c diff, ecoli, stool culture negative    Strept flu and blood cuylture NGTD     Significant Imagin/14 CXR No acute abnormality     CT abd and pelvis Imaging findings suggestive for a colitis, most probably involving the rectosigmoid colon with inflammatory and infectious etiologies to be primarily considered.  Ischemia is felt unlikely.  Pseudomembranous colitis would also be consideration.  There is a small amount of free fluid in the pelvis which is likely  related to the inflammatory changes of the bowel.     Abnormal thickening of the walls of the urinary bladder which could suggest a cystitis.  Correlation with urinalysis recommended.     Subtle ground-glass opacity in the right lung base which could represent a developing inflammatory/infectious process or sequela of aspiration.    8/9 CXR No acute process    8/7 CXR subtle increased density in the right lung base likely relating to a developing infiltrate.  Left lung is clear.  Heart size is normal. Skeletal structures are intact.    8/5 CTA chest    1. No evidence of acute cardiopulmonary embolus.  2. Still a zone of increased density identified in the patient's posterior gutter on the left 12 mm in size may reflect a early infiltrate or suspicious pulmonary nodule formation.  On any matter, short-term interval follow-up within a 6 month interval to document stability imaging findings is advised based on the Fleischner criteria.  3. 6 mm cyst projecting in the posterior interpolar region of the left kidney.  4. Prominent distention of the esophagus with evidence of retention of fluid that may reflect findings as a manifestation of achalasia with scleroderma as there is of moderate tapering of the distal esophagus at the level of the gastroesophageal junction    8/4 CXR No acute abnormality    8/7 EKG Sinus tachycardia  Otherwise normal ECG  When compared with ECG of 05-AUG-2020 07:47,  Nonspecific T wave abnormality no longer evident in Anterior leads  Confirmed by ALVIN PRIDE MD ([id number]) on 8/7/2020 12:59:42 PM    8/10 Echo Normal left ventricular systolic function. The estimated ejection fraction is 60%.  · No wall motion abnormalities.  · Normal LV diastolic function.  Normal right ventricular systolic function      Assessment/Plan:      * Hypokalemia  Replace IV and via PEG today  Labs in AM  Likely due to severe diarrhea  telemetry  K+ better 2.2>3.3 after 5 >> 10meq IV riders in ER and 20meq po   Will repeat g  tube this am 40meq po once this am    8/6: resolved. Stop K in fluids today. Still having diarrhea so will keep 1 more day and repeat labs in morning to be sure remains stable as this was his main reason for admission  8/7 K+ 4.2 .    Resolved    8/9  Replace via G tube. Worsening with diarrhea  8/10 change KCL to powder for PEG tube   8/11 replace , due to  GI loss with significant diarrhea   8/12 continues with some diarrhea now with blood noted, K+ better 3.6 ; now getting D5 1/2 with KCL   8/13 K+ 3.4- replace   8/14 K+ 2.9- replace     8/15 : resolved  BMP  Lab Results   Component Value Date     08/18/2020    K 3.5 08/18/2020     (H) 08/18/2020    CO2 15 (L) 08/18/2020    BUN 4 (L) 08/18/2020    CREATININE 0.7 08/18/2020    CALCIUM 8.1 (L) 08/18/2020    ANIONGAP 12 08/18/2020    ESTGFRAFRICA >60 08/18/2020    EGFRNONAA >60 08/18/2020 8/17 replace potassium today and resume peg feeds.  8/18 K stable today and should remain that way with feeds resumed     Colitis  If he continue with bloody diarrhea   May consider steroids : ? Ulcerative colitis       Decubitus ulcer of left buttock, stage 2    Moisture barrier oitment  Offload, turning q 2 .    Fever in adult  Not sure the cause of this.  In the last 2 months he has been innudated with antibiotics.  Cultures seem to always be negative.  Will get echo tomorrow.  Abx increased to cover mrsa. Addition of levaquin did not help with fevers, but wbc ct did improve. Will cont both for now.  Broaden work up.   If no obvious etiology will d/w id.  8/10 A little better with Vanc - no BM over night, afebrile this am   Echo pending,   8/11 Echo stable, now with recurrent diarrhea and fever, CT of abd and pelvis- if negative for source will need transfer for ID eval, GI eval   8/12 IV vanc change to PEG to Tx colitis demonstrated per CT scan, temp trending down finally   8/13 Tmax 99.4   8/14 AFEBRILE x 24 hrs   8/15  DC levaquin       RESOLVED .    Aspiration  pneumonitis  Recently admitted to [facility] with this diagnosis  Started on Vanc yesterday ? RLL pneumonia   Not really convinced of this. First xr with questionable right sided changes. Repeat cxr clear.  8/11 Day 4 levofloxacin, day 3 Vanc- continues with fever and diarrhea   8/12 Day 5 levoflxacin, afebrile; CT abd and pelvis yesterday noting Subtle ground-glass opacity in the right lung base which could represent a developing inflammatory/infectious process or sequela of aspiration.  8/13 Day 6 Levofloxacin 500mg per PEG for pneumonia - Right lung per CT     8/15/20  Day 7  Levofloxacin 500mg per PEG for pneumonia - Right lung per CT   DC today .    8/16/20  Resolved.    8/18 POX remains stable on RA 94%    Leukocytosis  Differential broad  Low grade fevers  C. Diff possible  CXR clear  UA clear  Aspiration in differential    Ruled out PE with CTA  Hold antibx for now as lactic acid and procal is normal but low threshold to start if any decompensation overnight .    8/6: resolved without antibx treatment. Could be dehydration.     8/7  Slight bump again today. Start cipro flagyl and check cxr again.    8/9  Wbc ct improved today. But fevers still very high. Start on vanc (d/c levaquin) with recent hospital admission. Monitor fever curve.  8/10 WBC 9.42> 13.68, Resp infection panel  and flu swab negative, Blood Cx - NGTD x 2 d   vanc started - afebrile this am,   8/11 WBC 13.68>12.59 , but now having recurrent diarrhea and fever   8/12 WBC 12.59> 12.79, expect to improve with Tx for colitis    8/13 WBC 11.23 trending down finally   Lab Results   Component Value Date    WBC 13.90 (H) 08/18/2020    HGB 12.4 (L) 08/18/2020    HCT 37.5 (L) 08/18/2020    MCV 89 08/18/2020     (H) 08/18/2020         RESOLVED .    Diarrhea of presumed infectious origin  Antibiotic associate diarrhea vs true C. Diff  C. Diff pending  If worsening clinical status will start PO Vanc for treatment; holding tonight as true clinical  picture not yet clear    8/6:  Check stool studies--pending. C. Diff negative  Change NS 150cc/hr  8/7 Blood Cx negative, afebrile but WBC trending up; will add Cipro + flagyl and cont for 7 days  Stool for Ova/parasites   8/12 CT abd suggestive of colitis IV vanc changed to PER PEG , continue flagyl day 5/7 , Tmax 99.3 overnight     8/8  Stool studies all negative. Started on flagyl anyway. Will give him probiotics as well.    8/9  Add in rotavirus panel today. Continued on flagyl.    8/10  Maybe this is an occult c diff? Interestingly it improved with addition of IV vanc?    8/11  Day 4 levofloxacin, day 3 Vanc- continues with fever and diarrhea started up again , will need transfer for ID, GI    8/12 This is C diff colitis clinically although C diff test is negative.  Colitis on CT and he is responding to first 24 hours of Vanc per gastrostomy. No fever in over 24 hours. Will keep NPG until diarrhea improves as it is causing perianal skin breakdown. D5 1/2 NS with KCL for maintenance fluids    8/13 + Colitis, may have been C- diff despite negative stools, Has has multiple ABX over the past 2 months  Continue Vanc per PEG - day2 , day 6 flagyl. I would not resume PEG feeds until diarrhea clears up as it is causing skin breakdown     8/14 Vanc per PEG day 3, Day 7 flagyl, add lactobacillus.    8/15  Lactobacillus clogs PEG tube   Try questran .    8/17 diarrhea is better on questran, resume feeds today,  Cont vanc and flagyl for now.    8/18  Cont peg feeds. Needs the ensure which is his usual feeds. isosource causing more stool. Non bloody. Will d/c today with questran. Has had 7 days po vanc and > 7 days po flagyl. No need top cont that. All stools thus far negative     Tachypnea  Repeat COVID ordered; rapid negative but + cough, diarrhea, mild LFT elevation  CXR without infiltrates  D-dimer elevated so checking CTA for PE tonight. Will start heparin gtt if CTA positive  No antibx for now with normal procal but  aspiration in differential as well so could add Zosyn if needed  8/5: POX 96% on RA and RR down to 18/min. Maybe dehydration/hypoK played a role here    8/6: resolved. COVID negative. RR 18 from 30-40 on admit.   8/7 remains with - check EKG verify sinus tach .  8/8  CXR looks like he has a developing infiltrate. Levaquin started today. Has fever, increased wbc ct and now infiltrate. Reorder blood culture.    8/9  Consulted pulm. Will get echo tomorrow and consult cards.   Resolved .    Sinus tachycardia  NS 10cc/hr  Likely some dehydration from frequent diarrhea (very low muscle mass so I trust Cr less)  D-dimer elevated so assessing for PE as well  CTA negative PE    Mom unsure his baseline heart rate.     Started on propranolol yesterday    8/9  With continued tachycardia and fever. Given single 500 cc bolus last night. Remains tachycardic. Worse with fevers.    8/10  Improved with propranolol and vanc.  8/11 Reviewed old progress notes ; BP documented but not HR- not sure of pts baseline   8/13 Improving with IVF and treatment of presumed cdiff colitis     8/14 RESOLVED- HR 80s now that he is afebrile with normal WBC    Cerebral palsy  Cont home phenobarbital and baclofen.  Cont tube feeds per home routine via PEG.  He is non-verbal and not mobile at baseline. He seems to be more interactive and grunting at TV today. Mom thinks this is his baseline        VTE Risk Mitigation (From admission, onward)         Ordered     IP VTE LOW RISK PATIENT  Once      08/04/20 1658                Discharge Planning   ROCÍO: 8/18/2020     Code Status: Full Code   Is the patient medically ready for discharge?:      Discharge Plan A: Home Health   Discharge Delays: None known at this time              Tico Reyna MD  Department of Hospital Medicine   [facility]

## 2020-08-18 NOTE — SUBJECTIVE & OBJECTIVE
Review of Systems   Unable to perform ROS: Patient nonverbal   Cardiovascular: Palpitations: PEG feedings on going but no more blood y stools.   Gastrointestinal: Positive for diarrhea.   Review of Systems   Unable to perform ROS: Patient nonverbal     Objective:     Vital Signs (Most Recent):  Temp: 98.9 °F (37.2 °C) (08/18/20 0803)  Pulse: (!) 116 (08/18/20 0803)  Resp: 18 (08/18/20 0803)  BP: (!) 102/56 (08/18/20 0803)  SpO2: (!) 94 % (08/18/20 0803) Vital Signs (24h Range):  Temp:  [97.1 °F (36.2 °C)-99.4 °F (37.4 °C)] 98.9 °F (37.2 °C)  Pulse:  [] 116  Resp:  [18-20] 18  SpO2:  [93 %-98 %] 94 %  BP: ()/(51-68) 102/56     Weight: 35.4 kg (78 lb)  Body mass index is 16.88 kg/m².    Intake/Output Summary (Last 24 hours) at 8/18/2020 0913  Last data filed at 8/17/2020 1800  Gross per 24 hour   Intake 670 ml   Output --   Net 670 ml      Physical Exam  Vitals signs and nursing note reviewed.   Constitutional:       Appearance: He is well-developed.   HENT:      Head: Normocephalic and atraumatic.      Right Ear: External ear normal.      Left Ear: External ear normal.      Nose: Nose normal.   Eyes:      General:         Right eye: No discharge.         Left eye: No discharge.      Conjunctiva/sclera: Conjunctivae normal.      Pupils: Pupils are equal, round, and reactive to light.      Comments: Strabismus right eye   Neck:      Musculoskeletal: Neck supple.      Thyroid: No thyromegaly.   Cardiovascular:      Rate and Rhythm: Normal rate and regular rhythm.      Heart sounds: No murmur. No friction rub. No gallop.       Comments: Tachycardia, regular  Pulmonary:      Effort: Pulmonary effort is normal. No respiratory distress.      Breath sounds: Normal breath sounds. No wheezing or rales.   Abdominal:      General: Abdomen is flat. Bowel sounds are normal. There is no distension.      Palpations: Abdomen is soft.      Tenderness: There is no abdominal tenderness.      Comments: PEG in place with mild  redness, no drainage    Foul smelling stool   Musculoskeletal:      Right lower leg: No edema.      Left lower leg: No edema.      Comments: Muscle atrophy  contractures   Lymphadenopathy:      Cervical: No cervical adenopathy.   Skin:     General: Skin is warm and dry.             Comments: Scaly rash : ? Related to leak from peg tube ? Contact dermatitis    Neurological:      Mental Status: He is alert.      Comments: Patient alert but otherwise not responsive and can not cooperate in exam, severe CP   Psychiatric:      Comments: Uneasy in bed. Rotating around         Significant Labs:   CBC:   Recent Labs   Lab 20  0641 20  0704   WBC 12.21 13.90*   HGB 11.7* 12.4*   HCT 35.3* 37.5*   * 571*     CMP:   Recent Labs   Lab 20  0641 20  0704    139   K 3.1* 3.5   * 112*   CO2 15* 15*   * 86   BUN <2* 4*   CREATININE 0.6 0.7   CALCIUM 7.5* 8.1*   PROT 5.3* 6.2   ALBUMIN 2.1* 2.5*   BILITOT 0.2 0.3   ALKPHOS 68 79   AST 21 19   ALT 19 20   ANIONGAP 7* 12   EGFRNONAA >60 >60     8/10  vanc trough 6.4        Hepatitis screen negative   Rota virus negative   covid not detected   Rep panel negative   Flu negative        procal 0.25  Lactate 1.1  U/a + blood      stool ova cyst and parasites none noted, rota negative, WBC none, occult positive  Blood cultures NGTD      phenobarb 25.8   covid not detected   D dimer 1.89   CRP 58   Ferritin 173       procal 0.21   Lactic 1.3   Negative rapid covid   c diff, ecoli, stool culture negative    Strept flu and blood cuylture NGTD     Significant Imagin/14 CXR No acute abnormality     CT abd and pelvis Imaging findings suggestive for a colitis, most probably involving the rectosigmoid colon with inflammatory and infectious etiologies to be primarily considered.  Ischemia is felt unlikely.  Pseudomembranous colitis would also be consideration.  There is a small amount of free fluid in the pelvis which is  likely related to the inflammatory changes of the bowel.     Abnormal thickening of the walls of the urinary bladder which could suggest a cystitis.  Correlation with urinalysis recommended.     Subtle ground-glass opacity in the right lung base which could represent a developing inflammatory/infectious process or sequela of aspiration.    8/9 CXR No acute process    8/7 CXR subtle increased density in the right lung base likely relating to a developing infiltrate.  Left lung is clear.  Heart size is normal. Skeletal structures are intact.    8/5 CTA chest    1. No evidence of acute cardiopulmonary embolus.  2. Still a zone of increased density identified in the patient's posterior gutter on the left 12 mm in size may reflect a early infiltrate or suspicious pulmonary nodule formation.  On any matter, short-term interval follow-up within a 6 month interval to document stability imaging findings is advised based on the Fleischner criteria.  3. 6 mm cyst projecting in the posterior interpolar region of the left kidney.  4. Prominent distention of the esophagus with evidence of retention of fluid that may reflect findings as a manifestation of achalasia with scleroderma as there is of moderate tapering of the distal esophagus at the level of the gastroesophageal junction    8/4 CXR No acute abnormality    8/7 EKG Sinus tachycardia  Otherwise normal ECG  When compared with ECG of 05-AUG-2020 07:47,  Nonspecific T wave abnormality no longer evident in Anterior leads  Confirmed by ALVIN PRIDE MD ([id number]) on 8/7/2020 12:59:42 PM    8/10 Echo Normal left ventricular systolic function. The estimated ejection fraction is 60%.  · No wall motion abnormalities.  · Normal LV diastolic function.  Normal right ventricular systolic function  Objective:     Vital Signs (Most Recent):  Temp: 98.9 °F (37.2 °C) (08/18/20 0803)  Pulse: 101 (08/18/20 1000)  Resp: 18 (08/18/20 0803)  BP: (!) 102/56 (08/18/20 0803)  SpO2: (!) 94 % (08/18/20  0803) Vital Signs (24h Range):  Temp:  [97.1 °F (36.2 °C)-99.4 °F (37.4 °C)] 98.9 °F (37.2 °C)  Pulse:  [] 101  Resp:  [18-20] 18  SpO2:  [93 %-98 %] 94 %  BP: ()/(51-68) 102/56     Weight: 35.4 kg (78 lb)  Body mass index is 16.88 kg/m².    Intake/Output Summary (Last 24 hours) at 8/18/2020 1045  Last data filed at 8/18/2020 0800  Gross per 24 hour   Intake 360 ml   Output --   Net 360 ml      Physical Exam  Vitals signs and nursing note reviewed.   Constitutional:       Appearance: He is well-developed.   HENT:      Head: Normocephalic and atraumatic.      Right Ear: External ear normal.      Left Ear: External ear normal.      Nose: Nose normal.   Eyes:      General:         Right eye: No discharge.         Left eye: No discharge.      Conjunctiva/sclera: Conjunctivae normal.      Pupils: Pupils are equal, round, and reactive to light.      Comments: Strabismus right eye   Neck:      Musculoskeletal: Neck supple.      Thyroid: No thyromegaly.   Cardiovascular:      Rate and Rhythm: Normal rate and regular rhythm.      Heart sounds: No murmur. No friction rub. No gallop.       Comments: Tachycardia, regular  Pulmonary:      Effort: Pulmonary effort is normal. No respiratory distress.      Breath sounds: Normal breath sounds. No wheezing or rales.   Abdominal:      General: Abdomen is flat. Bowel sounds are normal. There is no distension.      Palpations: Abdomen is soft.      Tenderness: There is no abdominal tenderness.      Comments: PEG in place with mild redness, no drainage    Foul smelling stool   Musculoskeletal:      Right lower leg: No edema.      Left lower leg: No edema.      Comments: Muscle atrophy  contractures   Lymphadenopathy:      Cervical: No cervical adenopathy.   Skin:     General: Skin is warm and dry.             Comments: Scaly rash : ? Related to leak from peg tube ? Contact dermatitis    Neurological:      Mental Status: He is alert.      Comments: Patient alert but otherwise  not responsive and can not cooperate in exam, severe CP   Psychiatric:      Comments: Uneasy in bed. Rotating around         Significant Labs:   CBC:   Recent Labs   Lab 20  0641 20  0704   WBC 12.21 13.90*   HGB 11.7* 12.4*   HCT 35.3* 37.5*   * 571*     CMP:   Recent Labs   Lab 20  0641 20  0704    139   K 3.1* 3.5   * 112*   CO2 15* 15*   * 86   BUN <2* 4*   CREATININE 0.6 0.7   CALCIUM 7.5* 8.1*   PROT 5.3* 6.2   ALBUMIN 2.1* 2.5*   BILITOT 0.2 0.3   ALKPHOS 68 79   AST 21 19   ALT 19 20   ANIONGAP 7* 12   EGFRNONAA >60 >60     8/10  vanc trough 6.4        Hepatitis screen negative   Rota virus negative   covid not detected   Rep panel negative   Flu negative        procal 0.25  Lactate 1.1  U/a + blood      stool ova cyst and parasites none noted, rota negative, WBC none, occult positive  Blood cultures NGTD      phenobarb 25.8   covid not detected   D dimer 1.89   CRP 58   Ferritin 173       procal 0.21   Lactic 1.3   Negative rapid covid   c diff, ecoli, stool culture negative    Strept flu and blood cuylture NGTD     Significant Imagin/14 CXR No acute abnormality     CT abd and pelvis Imaging findings suggestive for a colitis, most probably involving the rectosigmoid colon with inflammatory and infectious etiologies to be primarily considered.  Ischemia is felt unlikely.  Pseudomembranous colitis would also be consideration.  There is a small amount of free fluid in the pelvis which is likely related to the inflammatory changes of the bowel.     Abnormal thickening of the walls of the urinary bladder which could suggest a cystitis.  Correlation with urinalysis recommended.     Subtle ground-glass opacity in the right lung base which could represent a developing inflammatory/infectious process or sequela of aspiration.     CXR No acute process     CXR subtle increased density in the right lung base likely relating to a  developing infiltrate.  Left lung is clear.  Heart size is normal. Skeletal structures are intact.    8/5 CTA chest    1. No evidence of acute cardiopulmonary embolus.  2. Still a zone of increased density identified in the patient's posterior gutter on the left 12 mm in size may reflect a early infiltrate or suspicious pulmonary nodule formation.  On any matter, short-term interval follow-up within a 6 month interval to document stability imaging findings is advised based on the Fleischner criteria.  3. 6 mm cyst projecting in the posterior interpolar region of the left kidney.  4. Prominent distention of the esophagus with evidence of retention of fluid that may reflect findings as a manifestation of achalasia with scleroderma as there is of moderate tapering of the distal esophagus at the level of the gastroesophageal junction    8/4 CXR No acute abnormality    8/7 EKG Sinus tachycardia  Otherwise normal ECG  When compared with ECG of 05-AUG-2020 07:47,  Nonspecific T wave abnormality no longer evident in Anterior leads  Confirmed by ALVIN PRIDE MD ([id number]) on 8/7/2020 12:59:42 PM    8/10 Echo Normal left ventricular systolic function. The estimated ejection fraction is 60%.  · No wall motion abnormalities.  · Normal LV diastolic function.  Normal right ventricular systolic function

## 2020-08-18 NOTE — DISCHARGE SUMMARY
"[facility] Medicine  Discharge Summary      Patient Name: Glen Moscoso  MRN: [id number]  Admission Date: 8/4/2020  Hospital Length of Stay: 13 days  Discharge Date and Time:  08/18/2020 10:44 AM  Attending Physician: Anjelica Brandt MD   Discharging Provider: Mattie Muñoz NP  Primary Care Provider: Yadi Lawson MD      HPI:   Patient presented to ER with fever and cough. Patient has cerebral palsy and is non-verbal. Mother provides history. He was seen at [facility] ER 2 weeks ago and diagnosed with bronchitis and strep throat. Treated with amoxil. He completed antibx 4 days ago. Mom reports loose watery stools 3-4x a day for almost 2 weeks now. She noted he had fever yesterday "He was burning up" but did not check with thermometer. She notes he is breathing a little heavier than normal. + cough. She does not think he is in any pain. No changes to tube feeds (Ensure 4x a day via PEG). No sick contacts or recent travel but was in [facility] ER and was hospitalized at [place] before that for "aspiration" and on antibx then. Patient has low grade temp 99.5. HR 120s. RR 30s. Sats 96% on RA. Rapid COVID negative however note mild elevation LFTs, LDH, ferritin pending. D-dimer elevated, CTA pending. Lactic acid and procalcitonin normal. Repeat COVID swab ordered. WBC 15K. UA not infectious. K 2.2--likely due to diarrhea. GFR > 60 (Cr 0.7 but very low muscle mass). CXR clear. Flu and strep negative. Admitted for treatment of hypokalemia and possible C. Diff vs COVID vs PE.     * No surgery found *      Hospital Course:   CTA done this am negative for PE. POX 96% RR 18. He is still tachycardiac. With low grade fever. And diarrhea persist. K+ is better after 5-10meq KCL riders and 20meq via g tube.     8/6: COVID negative. Still having diarrhea. Still tachycardia (rate 120s and regular); mom doesn't know if this is his baseline. RR back to normal. He is more alert and interactive with TV. K is normal. " Stool studies still pending. C. Diff negative.     8/7/2020 Blood Cx negative x 3d- but WBC trending up WBC 12.08> 14.45, will add Cipro , repeat CXR   Patient had 3 loose stools last shift/voiding per diaper. Stool was sent to lab for ova and parasites as ordered, c-diff negative    K+ 4.2   Getting IVFs @ 150ml/hr , HR Remains 114 sinus tach    8/8  Fever overnight. More agitated. HR up to 150s. Given propranolol and HR down to 100s. Less agitated. Slept okay. Still with foul smelling stool.    8/9  Switched from cipro to levaquin to cover for potential right lower lobe developing infiltrate. However, last night, he had higher fever. Discussed at length with mom that he has been on >10 rounds of different antibiotics over the last 2 months. Not always with a definitive diagnosis. Currently, he is still having foul smelling abnormally colored stools. He is non verbal and does not grimace with movement of his arms or legs or head. He also is unphased by abdominal exam.    8/10/2020 Tmax 101.9 , FUO, ? Infectious diarrhea tx ; cipro changed to Levofloxacin day 3 to cover for ? RLL pneumonia - but cxr not convincing,   Still getting flagyl. Switched to vanc and since, no fever, no diarrhea. Long discussion regarding ~11 abx in the past 2 months, has never been on Vancomycin  Did total body exam to rule out possible skin soft tissue etiology;   ? Echo pending;   BP stable, -116 , afebrile this am so some improvement with Vanc-   Resp infection panel  and flu swab negative, Blood Cx - NGTD x 2 d , repeat Covid negative, C- diff negative     8/11/2020 Tmax 103, 100.7 this am , multiple watery stools overnight, started yesterday afternoon and now has stage 2 buttock wound  Echo with normal EF60%, no mention of valvular abn but  Overall the study quality was poor. The study was difficult due to patient's uncooperativeness, clinical status and poor endocardial visualization.   He gets ensure plus feedings  at home and  getting Isosource here; could be source of diarrhea ? But has fever also   Getting Vanc and flagyl ; day 3 vanc, day 4 levofloxacin/flagyl   Multiple stool negative for rotavirus, O&P negative   Abdomen is non tender but - Will CT abd and pelvis; if negative then will need transfer for ID/GI   This was discussed with mother     8/12 CT of abd pelivs yesterday suggestive of colitis and Subtle ground-glass opacity in the right lung base which could represent a developing inflammatory/infectious process or sequela of aspiration.   Started on Vanc per Peg tube, flagyl day 5 , Tmax 99.3 , did have some blood stools , tube feeding on hold , will resume in a few days  Now getting IV D5 1/2 with KCL 20meq   day 5  Levofloxacin for pneumonia ,   Turning q2 for buttocks with breakdown from recurrent diarrhea, moisture barrier applied   Noted to have low BP early this am; given 250ml IVF bolus , BP currently 118/70,  , o2 sat stable on RA    8/13 Day 2 of Vanc per peg , day 6 flagyl for colitis,  Day 6 Levofloxacin per PEG for Pneumonia  Tmax 99.4,  WBC finally trending down   Hold Peg feedings, getting IVFs , still having lots of diarrhea but getting better     8/14/20   Glen Moscoso is a 24 y.o. male  Who has cerebral palsy and is non-verbal.contractures ;s/p PEG tube .     Day 3 /10 Vanc per PEG, day 7/10 flagyl for colitis,  Day 7/ Levofloxacin per PEG for Pneumonia- will get CXR if improved can stop levofloxacin ( finished 7 days )  Now afebrile and normal WBC but, Still having mucoid/bloody diarrhea, K+ 3.6>3.4>2.9 ;   Getting D51/2 with KCL, Tube feedings on hold .    Add lactobacillus   Condition discussed with mother- possible d/c on Monday       8/15/20  CXR : clear lungs   DC Levaquin   Afebrile but diarrhea persists   Rash abdomen ; will try triamcinolone     8/16/20  Afebrile ;  colitis being treated with vancomycin  And flagyl .  Cholestyramine added yesterday .     But diarrhea with blood continues  Rash  in buttocks     8/17/20  He had 2 BM over last 24hr. No bloody. Feeds have also been on hold. Will resume today. Low grade fever 99.7. No elevated WBC. K 3.1 today  Hoping for no diarrhea ; DC home in am     8/18 He has resumed feeds yesterday. Had 3 BM after isosource but when given ensure plus which is his usual feed and only had 2 BM. None were bloody. We do not have ensure on formulary here. K is stable this am. He is stable and his mother is ready for d/c     Consults:   Consults (From admission, onward)        Status Ordering Provider     Inpatient consult to Registered Dietitian/Nutritionist  Once     Provider:  (Not yet assigned)    Completed PRUDENCE JIMENEZ          * Hypokalemia  Replace IV and via PEG today  Labs in AM  Likely due to severe diarrhea  telemetry  K+ better 2.2>3.3 after 5 >> 10meq IV riders in ER and 20meq po   Will repeat g tube this am 40meq po once this am    8/6: resolved. Stop K in fluids today. Still having diarrhea so will keep 1 more day and repeat labs in morning to be sure remains stable as this was his main reason for admission  8/7 K+ 4.2 .    Resolved    8/9  Replace via G tube. Worsening with diarrhea  8/10 change KCL to powder for PEG tube   8/11 replace , due to  GI loss with significant diarrhea   8/12 continues with some diarrhea now with blood noted, K+ better 3.6 ; now getting D5 1/2 with KCL   8/13 K+ 3.4- replace   8/14 K+ 2.9- replace     8/15 : resolved  BMP  Lab Results   Component Value Date     08/18/2020    K 3.5 08/18/2020     (H) 08/18/2020    CO2 15 (L) 08/18/2020    BUN 4 (L) 08/18/2020    CREATININE 0.7 08/18/2020    CALCIUM 8.1 (L) 08/18/2020    ANIONGAP 12 08/18/2020    ESTGFRAFRICA >60 08/18/2020    EGFRNONAA >60 08/18/2020 8/17 replace potassium today and resume peg feeds.  8/18 K stable today and should remain that way with feeds resumed     Colitis  If he continue with bloody diarrhea   May consider steroids : ? Ulcerative colitis        Decubitus ulcer of left buttock, stage 2    Moisture barrier oitment  Offload, turning q 2 .    Fever in adult  Not sure the cause of this.  In the last 2 months he has been innudated with antibiotics.  Cultures seem to always be negative.  Will get echo tomorrow.  Abx increased to cover mrsa. Addition of levaquin did not help with fevers, but wbc ct did improve. Will cont both for now.  Broaden work up.   If no obvious etiology will d/w id.  8/10 A little better with Vanc - no BM over night, afebrile this am   Echo pending,   8/11 Echo stable, now with recurrent diarrhea and fever, CT of abd and pelvis- if negative for source will need transfer for ID eval, GI eval   8/12 IV vanc change to PEG to Tx colitis demonstrated per CT scan, temp trending down finally   8/13 Tmax 99.4   8/14 AFEBRILE x 24 hrs   8/15  DC levaquin       RESOLVED .    Aspiration pneumonitis  Recently admitted to [facility] with this diagnosis  Started on Vanc yesterday ? RLL pneumonia   Not really convinced of this. First xr with questionable right sided changes. Repeat cxr clear.  8/11 Day 4 levofloxacin, day 3 Vanc- continues with fever and diarrhea   8/12 Day 5 levoflxacin, afebrile; CT abd and pelvis yesterday noting Subtle ground-glass opacity in the right lung base which could represent a developing inflammatory/infectious process or sequela of aspiration.  8/13 Day 6 Levofloxacin 500mg per PEG for pneumonia - Right lung per CT     8/15/20  Day 7  Levofloxacin 500mg per PEG for pneumonia - Right lung per CT   DC today .    8/16/20  Resolved.    8/18 POX remains stable on RA 94%    Leukocytosis  Differential broad  Low grade fevers  C. Diff possible  CXR clear  UA clear  Aspiration in differential    Ruled out PE with CTA  Hold antibx for now as lactic acid and procal is normal but low threshold to start if any decompensation overnight .    8/6: resolved without antibx treatment. Could be dehydration.     8/7  Slight bump again  today. Start cipro flagyl and check cxr again.    8/9  Wbc ct improved today. But fevers still very high. Start on vanc (d/c levaquin) with recent hospital admission. Monitor fever curve.  8/10 WBC 9.42> 13.68, Resp infection panel  and flu swab negative, Blood Cx - NGTD x 2 d   vanc started - afebrile this am,   8/11 WBC 13.68>12.59 , but now having recurrent diarrhea and fever   8/12 WBC 12.59> 12.79, expect to improve with Tx for colitis    8/13 WBC 11.23 trending down finally   Lab Results   Component Value Date    WBC 13.90 (H) 08/18/2020    HGB 12.4 (L) 08/18/2020    HCT 37.5 (L) 08/18/2020    MCV 89 08/18/2020     (H) 08/18/2020         RESOLVED .    Diarrhea of presumed infectious origin  Antibiotic associate diarrhea vs true C. Diff  C. Diff pending  If worsening clinical status will start PO Vanc for treatment; holding tonight as true clinical picture not yet clear    8/6:  Check stool studies--pending. C. Diff negative  Change NS 150cc/hr  8/7 Blood Cx negative, afebrile but WBC trending up; will add Cipro + flagyl and cont for 7 days  Stool for Ova/parasites   8/12 CT abd suggestive of colitis IV vanc changed to PER PEG , continue flagyl day 5/7 , Tmax 99.3 overnight     8/8  Stool studies all negative. Started on flagyl anyway. Will give him probiotics as well.    8/9  Add in rotavirus panel today. Continued on flagyl.    8/10  Maybe this is an occult c diff? Interestingly it improved with addition of IV vanc?    8/11  Day 4 levofloxacin, day 3 Vanc- continues with fever and diarrhea started up again , will need transfer for ID, GI    8/12 This is C diff colitis clinically although C diff test is negative.  Colitis on CT and he is responding to first 24 hours of Vanc per gastrostomy. No fever in over 24 hours. Will keep NPG until diarrhea improves as it is causing perianal skin breakdown. D5 1/2 NS with KCL for maintenance fluids    8/13 + Colitis, may have been C- diff despite negative stools,  Has has multiple ABX over the past 2 months  Continue Vanc per PEG - day2 , day 6 flagyl. I would not resume PEG feeds until diarrhea clears up as it is causing skin breakdown     8/14 Vanc per PEG day 3, Day 7 flagyl, add lactobacillus.    8/15  Lactobacillus clogs PEG tube   Try questran .    8/17 diarrhea is better on questran, resume feeds today,  Cont vanc and flagyl for now.    8/18  Cont peg feeds. Needs the ensure which is his usual feeds. isosource causing more stool. Non bloody. Will d/c today with questran. Has had 7 days po vanc and > 7 days po flagyl. No need top cont that. All stools thus far negative     Tachypnea  Repeat COVID ordered; rapid negative but + cough, diarrhea, mild LFT elevation  CXR without infiltrates  D-dimer elevated so checking CTA for PE tonight. Will start heparin gtt if CTA positive  No antibx for now with normal procal but aspiration in differential as well so could add Zosyn if needed  8/5: POX 96% on RA and RR down to 18/min. Maybe dehydration/hypoK played a role here    8/6: resolved. COVID negative. RR 18 from 30-40 on admit.   8/7 remains with - check EKG verify sinus tach .  8/8  CXR looks like he has a developing infiltrate. Levaquin started today. Has fever, increased wbc ct and now infiltrate. Reorder blood culture.    8/9  Consulted pulm. Will get echo tomorrow and consult cards.   Resolved .    Sinus tachycardia  NS 10cc/hr  Likely some dehydration from frequent diarrhea (very low muscle mass so I trust Cr less)  D-dimer elevated so assessing for PE as well  CTA negative PE    Mom unsure his baseline heart rate.     Started on propranolol yesterday    8/9  With continued tachycardia and fever. Given single 500 cc bolus last night. Remains tachycardic. Worse with fevers.    8/10  Improved with propranolol and vanc.  8/11 Reviewed old progress notes ; BP documented but not HR- not sure of pts baseline   8/13 Improving with IVF and treatment of presumed cdiff  colitis     8/14 RESOLVED- HR 80s now that he is afebrile with normal WBC    Cerebral palsy  Cont home phenobarbital and baclofen.  Cont tube feeds per home routine via PEG.  He is non-verbal and not mobile at baseline. He seems to be more interactive and grunting at TV today. Mom thinks this is his baseline        Final Active Diagnoses:    Diagnosis Date Noted POA    PRINCIPAL PROBLEM:  Hypokalemia [E87.6] 08/04/2020 Yes    Colitis [K52.9] 08/17/2020 No    Decubitus ulcer of left buttock, stage 2 [L89.322] 08/12/2020 No    Aspiration pneumonitis [J69.0] 08/09/2020 Yes    Fever in adult [R50.9] 08/09/2020 Yes    Cerebral palsy [G80.9] 08/04/2020 Yes    Sinus tachycardia [R00.0] 08/04/2020 Yes    Tachypnea [R06.82] 08/04/2020 Yes    Diarrhea of presumed infectious origin [R19.7] 08/04/2020 Yes    Leukocytosis [D72.829] 08/04/2020 Yes      Problems Resolved During this Admission:       Discharged Condition: good    Disposition: Home or Self Care    Follow Up:  Follow-up Information     Yadi Lawson MD In 1 week.    Specialty: Family Medicine  Contact information:  [address] [address] [address] [phone]                 Patient Instructions:      SUBSEQUENT HOME HEALTH ORDERS   Order Comments: Subsequent Home Health Orders    Current Medications:  Current Facility-Administered Medications:  acetaminophen tablet 650 mg, 650 mg, Oral, Q8H PRN, Mattie Muñoz NP  baclofen tablet 10 mg, 10 mg, Oral, TID, Anjelica Brandt MD, 10 mg at 08/07/20 0844  ciprofloxacin HCl tablet 500 mg, 500 mg, Per G Tube, Q12H, Cassy Adan NP  gabapentin 250 mg/5 mL solution 250 mg, 250 mg, Oral, Q8H, Anjelica Brandt MD, 250 mg at 08/07/20 0559  HYDROcodone-acetaminophen 5-325 mg per tablet 1 tablet, 1 tablet, Oral, Q4H PRN, Deuce Wood Jr., MD  metroNIDAZOLE tablet 500 mg, 500 mg, Per G Tube, Q8H, Cassy Adan NP  ondansetron injection 4 mg, 4 mg, Intravenous, Q8H PRN, Deuce Wood Jr.,  MD  PHENobarbitaL elixir 100 mg, 100 mg, Oral, QHS, Anjelica Brandt MD, 100 mg at 20 2101  promethazine (PHENERGAN) 12.5 mg in dextrose 5 % 50 mL IVPB, 12.5 mg, Intravenous, Q6H PRN, Deuce Wood Jr., MD  sodium chloride 0.9% flush 10 mL, 10 mL, Intravenous, PRN, Deuce Wood Jr., MD        Labs:  SN to perform labs:  CBC: Weekly; 3 week(s) and BMP: Weekly; 3 week(s) and Report Lab results to PCP.     Order Specific Question Answer Comments   What Home Health Agency is the patient currently using? Other/External        Significant Diagnostic Studies:     Significant Labs:   CBC:   Recent Labs   Lab 20  0641 20  0704   WBC 12.21 13.90*   HGB 11.7* 12.4*   HCT 35.3* 37.5*   * 571*     CMP:   Recent Labs   Lab 20  0641 20  0704    139   K 3.1* 3.5   * 112*   CO2 15* 15*   * 86   BUN <2* 4*   CREATININE 0.6 0.7   CALCIUM 7.5* 8.1*   PROT 5.3* 6.2   ALBUMIN 2.1* 2.5*   BILITOT 0.2 0.3   ALKPHOS 68 79   AST 21 19   ALT 19 20   ANIONGAP 7* 12   EGFRNONAA >60 >60     8/10  vanc trough 6.4        Hepatitis screen negative   Rota virus negative   covid not detected   Rep panel negative   Flu negative        procal 0.25  Lactate 1.1  U/a + blood      stool ova cyst and parasites none noted, rota negative, WBC none, occult positive  Blood cultures NGTD      phenobarb 25.8   covid not detected   D dimer 1.89   CRP 58   Ferritin 173       procal 0.21   Lactic 1.3   Negative rapid covid   c diff, ecoli, stool culture negative    Strept flu and blood cuylture NGTD     Significant Imagin/14 CXR No acute abnormality     CT abd and pelvis Imaging findings suggestive for a colitis, most probably involving the rectosigmoid colon with inflammatory and infectious etiologies to be primarily considered.  Ischemia is felt unlikely.  Pseudomembranous colitis would also be consideration.  There is a small amount of free fluid in the pelvis which is likely  related to the inflammatory changes of the bowel.     Abnormal thickening of the walls of the urinary bladder which could suggest a cystitis.  Correlation with urinalysis recommended.     Subtle ground-glass opacity in the right lung base which could represent a developing inflammatory/infectious process or sequela of aspiration.    8/9 CXR No acute process    8/7 CXR subtle increased density in the right lung base likely relating to a developing infiltrate.  Left lung is clear.  Heart size is normal. Skeletal structures are intact.    8/5 CTA chest    1. No evidence of acute cardiopulmonary embolus.  2. Still a zone of increased density identified in the patient's posterior gutter on the left 12 mm in size may reflect a early infiltrate or suspicious pulmonary nodule formation.  On any matter, short-term interval follow-up within a 6 month interval to document stability imaging findings is advised based on the Fleischner criteria.  3. 6 mm cyst projecting in the posterior interpolar region of the left kidney.  4. Prominent distention of the esophagus with evidence of retention of fluid that may reflect findings as a manifestation of achalasia with scleroderma as there is of moderate tapering of the distal esophagus at the level of the gastroesophageal junction    8/4 CXR No acute abnormality    8/7 EKG Sinus tachycardia  Otherwise normal ECG  When compared with ECG of 05-AUG-2020 07:47,  Nonspecific T wave abnormality no longer evident in Anterior leads  Confirmed by ALVIN PRIDE MD ([id number]) on 8/7/2020 12:59:42 PM    8/10 Echo Normal left ventricular systolic function. The estimated ejection fraction is 60%.  · No wall motion abnormalities.  · Normal LV diastolic function.  Normal right ventricular systolic function    Pending Diagnostic Studies:     None         Medications:  Reconciled Home Medications:      Medication List      START taking these medications    cholestyramine 4 gram packet  Commonly known as:  QUESTRAN  Take 1 packet (4 g total) by mouth 2 (two) times daily.     propranoloL 20 MG tablet  Commonly known as: INDERAL  Take 1 tablet (20 mg total) by mouth 3 (three) times daily.        CHANGE how you take these medications    PHENobarbitaL 20 mg/5 mL (4 mg/mL) Elix elixir  Take 25 mLs (100 mg total) by mouth every evening.  What changed: how much to take        CONTINUE taking these medications    baclofen 10 MG tablet  Commonly known as: LIORESAL  Take 10 mg by mouth 3 (three) times daily.     diphenhydrAMINE 12.5 mg/5 mL elixir  Commonly known as: BENADRYL  Take by mouth 4 (four) times daily as needed for Allergies.     gabapentin 250 mg/5 mL solution  Commonly known as: NEURONTIN  Take by mouth 3 (three) times daily.     ibuprofen 100 mg/5 mL suspension  Commonly known as: ADVIL,MOTRIN  Take by mouth every 6 (six) hours as needed for Temperature greater than.     Lactobacillus rhamnosus GG 10 billion cell capsule  Commonly known as: CULTURELLE  1 capsule by Per G Tube route once daily.     nystatin cream  Commonly known as: MYCOSTATIN  Apply topically 2 (two) times daily.        STOP taking these medications    cetirizine 1 mg/mL syrup  Commonly known as: ZYRTEC     lactulose 10 gram/15 mL solution  Commonly known as: CHRONULAC            Indwelling Lines/Drains at time of discharge:   Lines/Drains/Airways     Drain                 Gastrostomy/Enterostomy 08/04/20 1653 feeding 13 days                Time spent on the discharge of patient: 20 minutes  Patient was seen and examined on the date of discharge and determined to be suitable for discharge.         Mattie Muñoz NP  Department of Hospital Medicine  [facility]

## 2020-08-18 NOTE — PLAN OF CARE
08/18/20 1227   Post-Acute Status   Post-Acute Authorization Home Health   Home Health Status Set-up Complete   Discharge Plan   Discharge Plan A Home Health    contacted [facility] - spoke with Shawna. They have accepted the patient's case and he will be seen on tomorrow.

## 2020-08-18 NOTE — ASSESSMENT & PLAN NOTE
Recently admitted to [facility] with this diagnosis  Started on Vanc yesterday ? RLL pneumonia   Not really convinced of this. First xr with questionable right sided changes. Repeat cxr clear.  8/11 Day 4 levofloxacin, day 3 Vanc- continues with fever and diarrhea   8/12 Day 5 levoflxacin, afebrile; CT abd and pelvis yesterday noting Subtle ground-glass opacity in the right lung base which could represent a developing inflammatory/infectious process or sequela of aspiration.  8/13 Day 6 Levofloxacin 500mg per PEG for pneumonia - Right lung per CT     8/15/20  Day 7  Levofloxacin 500mg per PEG for pneumonia - Right lung per CT   DC today .    8/16/20  Resolved.    8/18 POX remains stable on RA 94%

## 2020-08-18 NOTE — ASSESSMENT & PLAN NOTE
Antibiotic associate diarrhea vs true C. Diff  C. Diff pending  If worsening clinical status will start PO Vanc for treatment; holding tonight as true clinical picture not yet clear    8/6:  Check stool studies--pending. C. Diff negative  Change NS 150cc/hr  8/7 Blood Cx negative, afebrile but WBC trending up; will add Cipro + flagyl and cont for 7 days  Stool for Ova/parasites   8/12 CT abd suggestive of colitis IV vanc changed to PER PEG , continue flagyl day 5/7 , Tmax 99.3 overnight     8/8  Stool studies all negative. Started on flagyl anyway. Will give him probiotics as well.    8/9  Add in rotavirus panel today. Continued on flagyl.    8/10  Maybe this is an occult c diff? Interestingly it improved with addition of IV vanc?    8/11  Day 4 levofloxacin, day 3 Vanc- continues with fever and diarrhea started up again , will need transfer for ID, GI    8/12 This is C diff colitis clinically although C diff test is negative.  Colitis on CT and he is responding to first 24 hours of Vanc per gastrostomy. No fever in over 24 hours. Will keep NPG until diarrhea improves as it is causing perianal skin breakdown. D5 1/2 NS with KCL for maintenance fluids    8/13 + Colitis, may have been C- diff despite negative stools, Has has multiple ABX over the past 2 months  Continue Vanc per PEG - day2 , day 6 flagyl. I would not resume PEG feeds until diarrhea clears up as it is causing skin breakdown     8/14 Vanc per PEG day 3, Day 7 flagyl, add lactobacillus.    8/15  Lactobacillus clogs PEG tube   Try questran .    8/17 diarrhea is better on questran, resume feeds today,  Cont vanc and flagyl for now.    8/18  Cont peg feeds. Needs the ensure which is his usual feeds. isosource causing more stool. Non bloody. Will d/c today with questran. Has had 7 days po vanc and > 7 days po flagyl. No need top cont that. All stools thus far negative

## 2020-08-18 NOTE — DISCHARGE INSTRUCTIONS
Ulcerative Colitis  You have been diagnosed with ulcerative colitis. Ulcerative colitis is a chronic condition that causes inflammation and ulcers in the rectum and colon. It is a form of inflammatory bowel disease (IBD). The disease is usually diagnosed by a special procedure called a colonoscopy. The symptoms usually develop over time. There is no medicine that can cure ulcerative colitis. The goal of treatment is to reduce the symptoms, and cause a remission.  Symptoms of ulcerative colitis include:  · Abdominal cramps and pain  · Diarrhea, usually bloody  · Rectal bleeding  · Rectal pain  · Fever  · Decreased appetite and weight loss  · Low energy  · Inflammation outside of the colon can occur and can cause pain or swelling in places like the eyes, skin, and joints  Home care  No one knows what exactly causes IBD. The goal is to control and relieve the symptoms, and prevent complications, so you can lead a full and active life. No medicine can cure the disease, but in some cases, surgery to remove the whole colon can be curative. However, surgery causes other side effects and so medicines are often preferred. Discuss your options with your healthcare provider.  Diet  Your diet did not cause your condition, but it can affect it. Unfortunately, no one diet that works for everyone, so you have to experiment. Below are some recommendations, but what works for you may be different. Keep a food log to figure out what you are sensitive to.  · Eat more slowly. Eat smaller amounts at a time, but more often. Remember, you can always eat more, but can't eat less once you've eaten too much.  · High-fiber foods are complicated. While they may help constipation, they can make bloating, cramping, gas, and diarrhea worse.  · Eat less sugar.  · Try avoiding dairy products if you feel you are sensitive to lactose.  · Try cutting out foods that are high in fat and fatty meats.  · You can control bloating and passing excess gas.  "Be careful with "gassy" vegetables and fruits like beans, cabbage, broccoli, and cauliflower.  · Be careful of carbonated beverages and fruit juices. They can make bloating and diarrhea worse.  · Caffeine, alcohol, and stimulants may make symptoms worse.  Lifestyle  Although stress doesn't cause IBD, it is a factor in flare-ups, and how you feel and react to your condition.  · Look for things that seem to make your symptoms worse, such as stress and emotions.  · Counseling can help you deal with stress. So can self-help measure like exercise, yoga, and meditation.  · Depression can be a part of this illness and antidepressant medicine may be prescribed. This may actually help with diarrhea, constipation, and cramping, as well as symptoms of depression.  · Smoking can make symptoms worse.  · Lack of sleep can make symptoms seem worse.  · Alcohol use can make symptoms worse.  Medicines  Your healthcare provider may prescribe medicines. Take them as directed. In most situations, lifelong medicine is necessary. For acute flares, additional prescription medicines can be prescribed. Call your provider if you need these.  · Ask your healthcare provider before taking any medicines for diarrhea.  · Avoid anti-inflammatory medicines like ibuprofen or naproxen.  · Consider nutritional supplements. This is especially true if the diarrhea is prolonged, or you aren't eating or are losing weight.  Follow-up care  Follow up with your healthcare provider, or as advised. Tell your provider if you lose more than 5 pounds over 3 to 6 months, and you aren't trying to lose weight.  If a stool sample was taken, or cultures were done, you will be told if they are positive, or if your treatment needs to be changed. You can call as directed for results.  If X-rays were done, a radiologist will look at them. You will be told if you need a change in treatment  It is very important to tell your doctor if you intend to get pregnant, or find out " you are pregnant. You will need to discuss your disease, medicines, and plan as early as possible and preferably before you conceive.  Call 911  Call 911 if any of these occur:  · Trouble breathing  · Confusion  · Very drowsy or trouble awakening  · Fainting or loss of consciousness  · Rapid heart rate  · Chest pain  When to seek medical advice  Call your healthcare provider right away if any of these occur:  · Bleeding from your rectum  · Frequent diarrhea or abdominal pain that's not controlled by your medicine  · Bloody diarrhea  · Fever of 100.4ºF (38ºC) or higher, or as directed by your health care provider  · Persistent nausea or repeated vomiting   Date Last Reviewed: 12/30/2015  © 4050-8958 [employer]. [address] [place] [address], [place] [address]. All rights reserved. This information is not intended as a substitute for professional medical care. Always follow your healthcare professional's instructions.

## 2020-08-18 NOTE — PROGRESS NOTES
"[facility] Medicine  Progress Note    Patient Name: Glen Moscoso  MRN: [id number]  Patient Class: IP- Inpatient   Admission Date: 8/4/2020  Length of Stay: 13 days  Attending Physician: Anjelica Brandt MD  Primary Care Provider: Yadi Lawson MD        Subjective:     Principal Problem:Hypokalemia        HPI:  Patient presented to ER with fever and cough. Patient has cerebral palsy and is non-verbal. Mother provides history. He was seen at [facility] ER 2 weeks ago and diagnosed with bronchitis and strep throat. Treated with amoxil. He completed antibx 4 days ago. Mom reports loose watery stools 3-4x a day for almost 2 weeks now. She noted he had fever yesterday "He was burning up" but did not check with thermometer. She notes he is breathing a little heavier than normal. + cough. She does not think he is in any pain. No changes to tube feeds (Ensure 4x a day via PEG). No sick contacts or recent travel but was in [facility] ER and was hospitalized at [place] before that for "aspiration" and on antibx then. Patient has low grade temp 99.5. HR 120s. RR 30s. Sats 96% on RA. Rapid COVID negative however note mild elevation LFTs, LDH, ferritin pending. D-dimer elevated, CTA pending. Lactic acid and procalcitonin normal. Repeat COVID swab ordered. WBC 15K. UA not infectious. K 2.2--likely due to diarrhea. GFR > 60 (Cr 0.7 but very low muscle mass). CXR clear. Flu and strep negative. Admitted for treatment of hypokalemia and possible C. Diff vs COVID vs PE.     Overview/Hospital Course:  CTA done this am negative for PE. POX 96% RR 18. He is still tachycardiac. With low grade fever. And diarrhea persist. K+ is better after 5-10meq KCL riders and 20meq via g tube.     8/6: COVID negative. Still having diarrhea. Still tachycardia (rate 120s and regular); mom doesn't know if this is his baseline. RR back to normal. He is more alert and interactive with TV. K is normal. Stool studies still pending. C. Diff " negative.     8/7/2020 Blood Cx negative x 3d- but WBC trending up WBC 12.08> 14.45, will add Cipro , repeat CXR   Patient had 3 loose stools last shift/voiding per diaper. Stool was sent to lab for ova and parasites as ordered, c-diff negative    K+ 4.2   Getting IVFs @ 150ml/hr , HR Remains 114 sinus tach    8/8  Fever overnight. More agitated. HR up to 150s. Given propranolol and HR down to 100s. Less agitated. Slept okay. Still with foul smelling stool.    8/9  Switched from cipro to levaquin to cover for potential right lower lobe developing infiltrate. However, last night, he had higher fever. Discussed at length with mom that he has been on >10 rounds of different antibiotics over the last 2 months. Not always with a definitive diagnosis. Currently, he is still having foul smelling abnormally colored stools. He is non verbal and does not grimace with movement of his arms or legs or head. He also is unphased by abdominal exam.    8/10/2020 Tmax 101.9 , FUO, ? Infectious diarrhea tx ; cipro changed to Levofloxacin day 3 to cover for ? RLL pneumonia - but cxr not convincing,   Still getting flagyl. Switched to vanc and since, no fever, no diarrhea. Long discussion regarding ~11 abx in the past 2 months, has never been on Vancomycin  Did total body exam to rule out possible skin soft tissue etiology;   ? Echo pending;   BP stable, -116 , afebrile this am so some improvement with Vanc-   Resp infection panel  and flu swab negative, Blood Cx - NGTD x 2 d , repeat Covid negative, C- diff negative     8/11/2020 Tmax 103, 100.7 this am , multiple watery stools overnight, started yesterday afternoon and now has stage 2 buttock wound  Echo with normal EF60%, no mention of valvular abn but  Overall the study quality was poor. The study was difficult due to patient's uncooperativeness, clinical status and poor endocardial visualization.   He gets ensure plus feedings  at home and getting Isosource here; could be  source of diarrhea ? But has fever also   Getting Vanc and flagyl ; day 3 vanc, day 4 levofloxacin/flagyl   Multiple stool negative for rotavirus, O&P negative   Abdomen is non tender but - Will CT abd and pelvis; if negative then will need transfer for ID/GI   This was discussed with mother     8/12 CT of abd pelivs yesterday suggestive of colitis and Subtle ground-glass opacity in the right lung base which could represent a developing inflammatory/infectious process or sequela of aspiration.   Started on Vanc per Peg tube, flagyl day 5 , Tmax 99.3 , did have some blood stools , tube feeding on hold , will resume in a few days  Now getting IV D5 1/2 with KCL 20meq   day 5  Levofloxacin for pneumonia ,   Turning q2 for buttocks with breakdown from recurrent diarrhea, moisture barrier applied   Noted to have low BP early this am; given 250ml IVF bolus , BP currently 118/70,  , o2 sat stable on RA    8/13 Day 2 of Vanc per peg , day 6 flagyl for colitis,  Day 6 Levofloxacin per PEG for Pneumonia  Tmax 99.4,  WBC finally trending down   Hold Peg feedings, getting IVFs , still having lots of diarrhea but getting better     8/14/20   Glen Moscoso is a 24 y.o. male  Who has cerebral palsy and is non-verbal.contractures ;s/p PEG tube .     Day 3 /10 Vanc per PEG, day 7/10 flagyl for colitis,  Day 7/ Levofloxacin per PEG for Pneumonia- will get CXR if improved can stop levofloxacin ( finished 7 days )  Now afebrile and normal WBC but, Still having mucoid/bloody diarrhea, K+ 3.6>3.4>2.9 ;   Getting D51/2 with KCL, Tube feedings on hold .    Add lactobacillus   Condition discussed with mother- possible d/c on Monday       8/15/20  CXR : clear lungs   DC Levaquin   Afebrile but diarrhea persists   Rash abdomen ; will try triamcinolone     8/16/20  Afebrile ;  colitis being treated with vancomycin  And flagyl .  Cholestyramine added yesterday .     But diarrhea with blood continues  Rash in buttocks     8/17/20  He had 2  BM over last 24hr. No bloody. Feeds have also been on hold. Will resume today. Low grade fever 99.7. No elevated WBC. K 3.1 today  Hoping for no diarrhea ; DC home in am     8/18 He has resumed feeds yesterday. Had 3 BM after isosource but when given ensure plus which is his usual feed and only had 2 BM. None were bloody. We do not have ensure on formulary here. K is stable this am. He is stable and his mother is ready for d/c    No new subjective & objective note has been filed under this hospital service since the last note was generated.      Assessment/Plan:      * Hypokalemia  Replace IV and via PEG today  Labs in AM  Likely due to severe diarrhea  telemetry  K+ better 2.2>3.3 after 5 >> 10meq IV riders in ER and 20meq po   Will repeat g tube this am 40meq po once this am    8/6: resolved. Stop K in fluids today. Still having diarrhea so will keep 1 more day and repeat labs in morning to be sure remains stable as this was his main reason for admission  8/7 K+ 4.2 .    Resolved    8/9  Replace via G tube. Worsening with diarrhea  8/10 change KCL to powder for PEG tube   8/11 replace , due to  GI loss with significant diarrhea   8/12 continues with some diarrhea now with blood noted, K+ better 3.6 ; now getting D5 1/2 with KCL   8/13 K+ 3.4- replace   8/14 K+ 2.9- replace     8/15 : resolved  BMP  Lab Results   Component Value Date     08/18/2020    K 3.5 08/18/2020     (H) 08/18/2020    CO2 15 (L) 08/18/2020    BUN 4 (L) 08/18/2020    CREATININE 0.7 08/18/2020    CALCIUM 8.1 (L) 08/18/2020    ANIONGAP 12 08/18/2020    ESTGFRAFRICA >60 08/18/2020    EGFRNONAA >60 08/18/2020 8/17 replace potassium today and resume peg feeds.  8/18 K stable today and should remain that way with feeds resumed     Colitis  If he continue with bloody diarrhea   May consider steroids : ? Ulcerative colitis       Decubitus ulcer of left buttock, stage 2    Moisture barrier oitment  Offload, turning q 2 .    Fever in  adult  Not sure the cause of this.  In the last 2 months he has been innudated with antibiotics.  Cultures seem to always be negative.  Will get echo tomorrow.  Abx increased to cover mrsa. Addition of levaquin did not help with fevers, but wbc ct did improve. Will cont both for now.  Broaden work up.   If no obvious etiology will d/w id.  8/10 A little better with Vanc - no BM over night, afebrile this am   Echo pending,   8/11 Echo stable, now with recurrent diarrhea and fever, CT of abd and pelvis- if negative for source will need transfer for ID eval, GI eval   8/12 IV vanc change to PEG to Tx colitis demonstrated per CT scan, temp trending down finally   8/13 Tmax 99.4   8/14 AFEBRILE x 24 hrs   8/15  DC levaquin       RESOLVED .    Aspiration pneumonitis  Recently admitted to [facility] with this diagnosis  Started on Vanc yesterday ? RLL pneumonia   Not really convinced of this. First xr with questionable right sided changes. Repeat cxr clear.  8/11 Day 4 levofloxacin, day 3 Vanc- continues with fever and diarrhea   8/12 Day 5 levoflxacin, afebrile; CT abd and pelvis yesterday noting Subtle ground-glass opacity in the right lung base which could represent a developing inflammatory/infectious process or sequela of aspiration.  8/13 Day 6 Levofloxacin 500mg per PEG for pneumonia - Right lung per CT     8/15/20  Day 7  Levofloxacin 500mg per PEG for pneumonia - Right lung per CT   DC today .    8/16/20  Resolved.    8/18 POX remains stable on RA 94%    Leukocytosis  Differential broad  Low grade fevers  C. Diff possible  CXR clear  UA clear  Aspiration in differential    Ruled out PE with CTA  Hold antibx for now as lactic acid and procal is normal but low threshold to start if any decompensation overnight .    8/6: resolved without antibx treatment. Could be dehydration.     8/7  Slight bump again today. Start cipro flagyl and check cxr again.    8/9  Wbc ct improved today. But fevers still very high. Start on  vanc (d/c levaquin) with recent hospital admission. Monitor fever curve.  8/10 WBC 9.42> 13.68, Resp infection panel  and flu swab negative, Blood Cx - NGTD x 2 d   vanc started - afebrile this am,   8/11 WBC 13.68>12.59 , but now having recurrent diarrhea and fever   8/12 WBC 12.59> 12.79, expect to improve with Tx for colitis    8/13 WBC 11.23 trending down finally   Lab Results   Component Value Date    WBC 13.90 (H) 08/18/2020    HGB 12.4 (L) 08/18/2020    HCT 37.5 (L) 08/18/2020    MCV 89 08/18/2020     (H) 08/18/2020         RESOLVED .    Diarrhea of presumed infectious origin  Antibiotic associate diarrhea vs true C. Diff  C. Diff pending  If worsening clinical status will start PO Vanc for treatment; holding tonight as true clinical picture not yet clear    8/6:  Check stool studies--pending. C. Diff negative  Change NS 150cc/hr  8/7 Blood Cx negative, afebrile but WBC trending up; will add Cipro + flagyl and cont for 7 days  Stool for Ova/parasites   8/12 CT abd suggestive of colitis IV vanc changed to PER PEG , continue flagyl day 5/7 , Tmax 99.3 overnight     8/8  Stool studies all negative. Started on flagyl anyway. Will give him probiotics as well.    8/9  Add in rotavirus panel today. Continued on flagyl.    8/10  Maybe this is an occult c diff? Interestingly it improved with addition of IV vanc?    8/11  Day 4 levofloxacin, day 3 Vanc- continues with fever and diarrhea started up again , will need transfer for ID, GI    8/12 This is C diff colitis clinically although C diff test is negative.  Colitis on CT and he is responding to first 24 hours of Vanc per gastrostomy. No fever in over 24 hours. Will keep NPG until diarrhea improves as it is causing perianal skin breakdown. D5 1/2 NS with KCL for maintenance fluids    8/13 + Colitis, may have been C- diff despite negative stools, Has has multiple ABX over the past 2 months  Continue Vanc per PEG - day2 , day 6 flagyl. I would not resume PEG  feeds until diarrhea clears up as it is causing skin breakdown     8/14 Vanc per PEG day 3, Day 7 flagyl, add lactobacillus.    8/15  Lactobacillus clogs PEG tube   Try questran .    8/17 diarrhea is better on questran, resume feeds today,  Cont vanc and flagyl for now.    8/18  Cont peg feeds. Needs the ensure which is his usual feeds. isosource causing more stool. Non bloody. Will d/c today with questran. Has had 7 days po vanc and > 7 days po flagyl. No need top cont that. All stools thus far negative     Tachypnea  Repeat COVID ordered; rapid negative but + cough, diarrhea, mild LFT elevation  CXR without infiltrates  D-dimer elevated so checking CTA for PE tonight. Will start heparin gtt if CTA positive  No antibx for now with normal procal but aspiration in differential as well so could add Zosyn if needed  8/5: POX 96% on RA and RR down to 18/min. Maybe dehydration/hypoK played a role here    8/6: resolved. COVID negative. RR 18 from 30-40 on admit.   8/7 remains with - check EKG verify sinus tach .  8/8  CXR looks like he has a developing infiltrate. Levaquin started today. Has fever, increased wbc ct and now infiltrate. Reorder blood culture.    8/9  Consulted pulm. Will get echo tomorrow and consult cards.   Resolved .    Sinus tachycardia  NS 10cc/hr  Likely some dehydration from frequent diarrhea (very low muscle mass so I trust Cr less)  D-dimer elevated so assessing for PE as well  CTA negative PE    Mom unsure his baseline heart rate.     Started on propranolol yesterday    8/9  With continued tachycardia and fever. Given single 500 cc bolus last night. Remains tachycardic. Worse with fevers.    8/10  Improved with propranolol and vanc.  8/11 Reviewed old progress notes ; BP documented but not HR- not sure of pts baseline   8/13 Improving with IVF and treatment of presumed cdiff colitis     8/14 RESOLVED- HR 80s now that he is afebrile with normal WBC    Cerebral palsy  Cont home phenobarbital  and baclofen.  Cont tube feeds per home routine via PEG.  He is non-verbal and not mobile at baseline. He seems to be more interactive and grunting at TV today. Mom thinks this is his baseline        VTE Risk Mitigation (From admission, onward)         Ordered     IP VTE LOW RISK PATIENT  Once      08/04/20 1658                Discharge Planning   ROCÍO: 8/18/2020     Code Status: Full Code     Discharge Plan A: Home Health   Discharge Delays: None known at this time              Tico Reyna MD  Department of Hospital Medicine   [facility]

## 2020-08-18 NOTE — PLAN OF CARE
Patient planned for discharge today. Patient will discharge back home with mother, and will resume home health with [facility]. There are no other discharge needs.      08/18/20 1120   Final Note   Assessment Type Final Discharge Note   Anticipated Discharge Disposition Home-Health   What phone number can be called within the next 1-3 days to see how you are doing after discharge? [phone]   Discharge plans and expectations educations in teach back method with documentation complete? Yes   Right Care Referral Info   Post Acute Recommendation Home-care   Referral Type Home care   [place] [address]   Post-Acute Status   Post-Acute Authorization Home Health   Home Health Status Set-up Complete   Discharge Delays None known at this time

## 2020-08-18 NOTE — ASSESSMENT & PLAN NOTE
Differential broad  Low grade fevers  C. Diff possible  CXR clear  UA clear  Aspiration in differential    Ruled out PE with CTA  Hold antibx for now as lactic acid and procal is normal but low threshold to start if any decompensation overnight .    8/6: resolved without antibx treatment. Could be dehydration.     8/7  Slight bump again today. Start cipro flagyl and check cxr again.    8/9  Wbc ct improved today. But fevers still very high. Start on vanc (d/c levaquin) with recent hospital admission. Monitor fever curve.  8/10 WBC 9.42> 13.68, Resp infection panel  and flu swab negative, Blood Cx - NGTD x 2 d   vanc started - afebrile this am,   8/11 WBC 13.68>12.59 , but now having recurrent diarrhea and fever   8/12 WBC 12.59> 12.79, expect to improve with Tx for colitis    8/13 WBC 11.23 trending down finally   Lab Results   Component Value Date    WBC 13.90 (H) 08/18/2020    HGB 12.4 (L) 08/18/2020    HCT 37.5 (L) 08/18/2020    MCV 89 08/18/2020     (H) 08/18/2020         RESOLVED .

## 2020-08-18 NOTE — PLAN OF CARE
Patient resting with mom at bedside. No complaints or concern at this time. Questions answered. ST on tele, otherwise VS stable. Non verbal. Several large loose/mucoid stools. Resumed tube feeding boluses. Patient tolerating. Mother hopeful for discharge. No acute changes noted. Plan of care reviewed and agreed upon.

## 2020-08-18 NOTE — ASSESSMENT & PLAN NOTE
Replace IV and via PEG today  Labs in AM  Likely due to severe diarrhea  telemetry  K+ better 2.2>3.3 after 5 >> 10meq IV riders in ER and 20meq po   Will repeat g tube this am 40meq po once this am    8/6: resolved. Stop K in fluids today. Still having diarrhea so will keep 1 more day and repeat labs in morning to be sure remains stable as this was his main reason for admission  8/7 K+ 4.2 .    Resolved    8/9  Replace via G tube. Worsening with diarrhea  8/10 change KCL to powder for PEG tube   8/11 replace , due to  GI loss with significant diarrhea   8/12 continues with some diarrhea now with blood noted, K+ better 3.6 ; now getting D5 1/2 with KCL   8/13 K+ 3.4- replace   8/14 K+ 2.9- replace     8/15 : resolved  BMP  Lab Results   Component Value Date     08/18/2020    K 3.5 08/18/2020     (H) 08/18/2020    CO2 15 (L) 08/18/2020    BUN 4 (L) 08/18/2020    CREATININE 0.7 08/18/2020    CALCIUM 8.1 (L) 08/18/2020    ANIONGAP 12 08/18/2020    ESTGFRAFRICA >60 08/18/2020    EGFRNONAA >60 08/18/2020 8/17 replace potassium today and resume peg feeds.  8/18 K stable today and should remain that way with feeds resumed

## 2020-08-18 NOTE — PROGRESS NOTES
Pt is discharged instructions and education done. Scripts are sent. appt made. CHRISTOPH called

## 2020-08-18 NOTE — ASSESSMENT & PLAN NOTE
Recently admitted to [place] with this diagnosis  Started on Vanc yesterday ? RLL pneumonia   Not really convinced of this. First xr with questionable right sided changes. Repeat cxr clear.  8/11 Day 4 levofloxacin, day 3 Vanc- continues with fever and diarrhea   8/12 Day 5 levoflxacin, afebrile; CT abd and pelvis yesterday noting Subtle ground-glass opacity in the right lung base which could represent a developing inflammatory/infectious process or sequela of aspiration.  8/13 Day 6 Levofloxacin 500mg per PEG for pneumonia - Right lung per CT     8/15/20  Day 7  Levofloxacin 500mg per PEG for pneumonia - Right lung per CT   DC today .    8/16/20  Resolved.    8/18 POX remains stable on RA 94%

## 2020-08-18 NOTE — SUBJECTIVE & OBJECTIVE
Review of Systems   Unable to perform ROS: Patient nonverbal     Objective:     Vital Signs (Most Recent):  Temp: 98.9 °F (37.2 °C) (08/18/20 0803)  Pulse: (!) 116 (08/18/20 0803)  Resp: 18 (08/18/20 0803)  BP: (!) 102/56 (08/18/20 0803)  SpO2: (!) 94 % (08/18/20 0803) Vital Signs (24h Range):  Temp:  [97.1 °F (36.2 °C)-99.4 °F (37.4 °C)] 98.9 °F (37.2 °C)  Pulse:  [] 116  Resp:  [18-20] 18  SpO2:  [93 %-98 %] 94 %  BP: ()/(51-68) 102/56     Weight: 35.4 kg (78 lb)  Body mass index is 16.88 kg/m².    Intake/Output Summary (Last 24 hours) at 8/18/2020 0913  Last data filed at 8/17/2020 1800  Gross per 24 hour   Intake 670 ml   Output --   Net 670 ml      Physical Exam  Vitals signs and nursing note reviewed.   Constitutional:       Appearance: He is well-developed.   HENT:      Head: Normocephalic and atraumatic.      Right Ear: External ear normal.      Left Ear: External ear normal.      Nose: Nose normal.   Eyes:      General:         Right eye: No discharge.         Left eye: No discharge.      Conjunctiva/sclera: Conjunctivae normal.      Pupils: Pupils are equal, round, and reactive to light.      Comments: Strabismus right eye   Neck:      Musculoskeletal: Neck supple.      Thyroid: No thyromegaly.   Cardiovascular:      Rate and Rhythm: Normal rate and regular rhythm.      Heart sounds: No murmur. No friction rub. No gallop.       Comments: Tachycardia, regular  Pulmonary:      Effort: Pulmonary effort is normal. No respiratory distress.      Breath sounds: Normal breath sounds. No wheezing or rales.   Abdominal:      General: Abdomen is flat. Bowel sounds are normal. There is no distension.      Palpations: Abdomen is soft.      Tenderness: There is no abdominal tenderness.      Comments: PEG in place with mild redness, no drainage    Foul smelling stool   Musculoskeletal:      Right lower leg: No edema.      Left lower leg: No edema.      Comments: Muscle atrophy  contractures   Lymphadenopathy:       Cervical: No cervical adenopathy.   Skin:     General: Skin is warm and dry.             Comments: Scaly rash : ? Related to leak from peg tube ? Contact dermatitis    Neurological:      Mental Status: He is alert.      Comments: Patient alert but otherwise not responsive and can not cooperate in exam, severe CP   Psychiatric:      Comments: Uneasy in bed. Rotating around         Significant Labs:   CBC:   Recent Labs   Lab 20  0641 20  0704   WBC 12.21 13.90*   HGB 11.7* 12.4*   HCT 35.3* 37.5*   * 571*     CMP:   Recent Labs   Lab 20  0641 20  0704    139   K 3.1* 3.5   * 112*   CO2 15* 15*   * 86   BUN <2* 4*   CREATININE 0.6 0.7   CALCIUM 7.5* 8.1*   PROT 5.3* 6.2   ALBUMIN 2.1* 2.5*   BILITOT 0.2 0.3   ALKPHOS 68 79   AST 21 19   ALT 19 20   ANIONGAP 7* 12   EGFRNONAA >60 >60     8/10  vanc trough 6.4        Hepatitis screen negative   Rota virus negative   covid not detected   Rep panel negative   Flu negative        procal 0.25  Lactate 1.1  U/a + blood      stool ova cyst and parasites none noted, rota negative, WBC none, occult positive  Blood cultures NGTD      phenobarb 25.8   covid not detected   D dimer 1.89   CRP 58   Ferritin 173       procal 0.21   Lactic 1.3   Negative rapid covid   c diff, ecoli, stool culture negative    Strept flu and blood cuylture NGTD     Significant Imagin/14 CXR No acute abnormality     CT abd and pelvis Imaging findings suggestive for a colitis, most probably involving the rectosigmoid colon with inflammatory and infectious etiologies to be primarily considered.  Ischemia is felt unlikely.  Pseudomembranous colitis would also be consideration.  There is a small amount of free fluid in the pelvis which is likely related to the inflammatory changes of the bowel.     Abnormal thickening of the walls of the urinary bladder which could suggest a cystitis.  Correlation with urinalysis  recommended.     Subtle ground-glass opacity in the right lung base which could represent a developing inflammatory/infectious process or sequela of aspiration.    8/9 CXR No acute process    8/7 CXR subtle increased density in the right lung base likely relating to a developing infiltrate.  Left lung is clear.  Heart size is normal. Skeletal structures are intact.    8/5 CTA chest    1. No evidence of acute cardiopulmonary embolus.  2. Still a zone of increased density identified in the patient's posterior gutter on the left 12 mm in size may reflect a early infiltrate or suspicious pulmonary nodule formation.  On any matter, short-term interval follow-up within a 6 month interval to document stability imaging findings is advised based on the Fleischner criteria.  3. 6 mm cyst projecting in the posterior interpolar region of the left kidney.  4. Prominent distention of the esophagus with evidence of retention of fluid that may reflect findings as a manifestation of achalasia with scleroderma as there is of moderate tapering of the distal esophagus at the level of the gastroesophageal junction    8/4 CXR No acute abnormality    8/7 EKG Sinus tachycardia  Otherwise normal ECG  When compared with ECG of 05-AUG-2020 07:47,  Nonspecific T wave abnormality no longer evident in Anterior leads  Confirmed by ALVIN PRIDE MD ([id number]) on 8/7/2020 12:59:42 PM    8/10 Echo Normal left ventricular systolic function. The estimated ejection fraction is 60%.  · No wall motion abnormalities.  · Normal LV diastolic function.  Normal right ventricular systolic function

## 2020-08-19 ENCOUNTER — PATIENT OUTREACH (OUTPATIENT)
Dept: ADMINISTRATIVE | Facility: CLINIC | Age: 25
End: 2020-08-19

## 2020-08-26 ENCOUNTER — HOSPITAL ENCOUNTER (INPATIENT)
Facility: HOSPITAL | Age: 25
LOS: 23 days | Discharge: HOME HEALTH | DRG: 853 | End: 2020-09-18
Attending: PSYCHIATRY & NEUROLOGY | Admitting: PSYCHIATRY & NEUROLOGY
Payer: MEDICAID

## 2020-08-26 DIAGNOSIS — T14.8XXA DEEP TISSUE INJURY: ICD-10-CM

## 2020-08-26 DIAGNOSIS — E43 SEVERE PROTEIN-CALORIE MALNUTRITION: ICD-10-CM

## 2020-08-26 DIAGNOSIS — G40.901 STATUS EPILEPTICUS: ICD-10-CM

## 2020-08-26 DIAGNOSIS — R50.9 FEVER, UNSPECIFIED FEVER CAUSE: ICD-10-CM

## 2020-08-26 DIAGNOSIS — J96.01 ACUTE RESPIRATORY FAILURE WITH HYPOXIA: ICD-10-CM

## 2020-08-26 DIAGNOSIS — G80.9 CEREBRAL PALSY, UNSPECIFIED TYPE: ICD-10-CM

## 2020-08-26 DIAGNOSIS — R06.1 BIPHASIC STRIDOR: ICD-10-CM

## 2020-08-26 DIAGNOSIS — D62 ACUTE BLOOD LOSS ANEMIA: ICD-10-CM

## 2020-08-26 DIAGNOSIS — Z46.59 ENCOUNTER FOR OROGASTRIC (OG) TUBE PLACEMENT: ICD-10-CM

## 2020-08-26 DIAGNOSIS — E87.6 HYPOKALEMIA: ICD-10-CM

## 2020-08-26 DIAGNOSIS — L89.324 DECUBITUS ULCER OF ISCHIAL AREA, LEFT, STAGE IV: ICD-10-CM

## 2020-08-26 DIAGNOSIS — J15.1 PNEUMONIA DUE TO PSEUDOMONAS SPECIES: ICD-10-CM

## 2020-08-26 DIAGNOSIS — R19.7 DIARRHEA, UNSPECIFIED TYPE: ICD-10-CM

## 2020-08-26 DIAGNOSIS — I95.9 HYPOTENSION: ICD-10-CM

## 2020-08-26 DIAGNOSIS — L89.314 PRESSURE INJURY OF RIGHT ISCHIUM, STAGE 4: ICD-10-CM

## 2020-08-26 DIAGNOSIS — D63.8 ANEMIA OF CHRONIC DISEASE: ICD-10-CM

## 2020-08-26 DIAGNOSIS — G40.909 SEIZURE DISORDER: ICD-10-CM

## 2020-08-26 DIAGNOSIS — R00.0 TACHYCARDIA: ICD-10-CM

## 2020-08-26 DIAGNOSIS — I95.9 HYPOTENSION, UNSPECIFIED HYPOTENSION TYPE: ICD-10-CM

## 2020-08-26 DIAGNOSIS — K52.9 PROCTOCOLITIS: ICD-10-CM

## 2020-08-26 DIAGNOSIS — G93.40 ENCEPHALOPATHY ACUTE: ICD-10-CM

## 2020-08-26 DIAGNOSIS — K52.9 COLITIS: Primary | ICD-10-CM

## 2020-08-26 DIAGNOSIS — E87.0 HYPERNATREMIA: ICD-10-CM

## 2020-08-26 DIAGNOSIS — J15.1 PNEUMONIA DUE TO PSEUDOMONAS SPECIES, UNSPECIFIED LATERALITY, UNSPECIFIED PART OF LUNG: ICD-10-CM

## 2020-08-26 DIAGNOSIS — E83.39 HYPOPHOSPHATEMIA: ICD-10-CM

## 2020-08-26 DIAGNOSIS — A49.8 BACTERIAL INFECTION DUE TO SERRATIA: ICD-10-CM

## 2020-08-26 DIAGNOSIS — L89.322 DECUBITUS ULCER OF LEFT BUTTOCK, STAGE 2: ICD-10-CM

## 2020-08-26 PROBLEM — Z97.8 ENDOTRACHEALLY INTUBATED: Status: ACTIVE | Noted: 2020-08-26

## 2020-08-26 PROBLEM — R06.03 RESPIRATORY DISTRESS: Status: ACTIVE | Noted: 2020-08-26

## 2020-08-26 PROBLEM — R56.9 SEIZURE: Status: ACTIVE | Noted: 2020-08-26

## 2020-08-26 LAB
ALBUMIN SERPL BCP-MCNC: 1.9 G/DL (ref 3.5–5.2)
ALLENS TEST: ABNORMAL
ALP SERPL-CCNC: 101 U/L (ref 55–135)
ALT SERPL W/O P-5'-P-CCNC: 22 U/L (ref 10–44)
ANION GAP SERPL CALC-SCNC: 11 MMOL/L (ref 8–16)
ANION GAP SERPL CALC-SCNC: 7 MMOL/L (ref 8–16)
ANION GAP SERPL CALC-SCNC: 8 MMOL/L (ref 8–16)
ANISOCYTOSIS BLD QL SMEAR: SLIGHT
ANISOCYTOSIS BLD QL SMEAR: SLIGHT
ASCENDING AORTA: 2.56 CM
AST SERPL-CCNC: 27 U/L (ref 10–40)
AV INDEX (PROSTH): 0.88
AV MEAN GRADIENT: 2 MMHG
AV PEAK GRADIENT: 2 MMHG
AV VALVE AREA: 2.83 CM2
AV VELOCITY RATIO: 0.82
BACTERIA #/AREA URNS AUTO: ABNORMAL /HPF
BASOPHILS # BLD AUTO: 0.15 K/UL (ref 0–0.2)
BASOPHILS # BLD AUTO: 0.15 K/UL (ref 0–0.2)
BASOPHILS NFR BLD: 0.7 % (ref 0–1.9)
BASOPHILS NFR BLD: 0.7 % (ref 0–1.9)
BILIRUB SERPL-MCNC: 0.4 MG/DL (ref 0.1–1)
BILIRUB UR QL STRIP: NEGATIVE
BSA FOR ECHO PROCEDURE: 1.15 M2
BUN SERPL-MCNC: 46 MG/DL (ref 6–20)
BUN SERPL-MCNC: 46 MG/DL (ref 6–20)
BUN SERPL-MCNC: 47 MG/DL (ref 6–20)
CALCIUM SERPL-MCNC: 7 MG/DL (ref 8.7–10.5)
CALCIUM SERPL-MCNC: 7.1 MG/DL (ref 8.7–10.5)
CALCIUM SERPL-MCNC: 7.4 MG/DL (ref 8.7–10.5)
CHLORIDE SERPL-SCNC: 109 MMOL/L (ref 95–110)
CHLORIDE SERPL-SCNC: 116 MMOL/L (ref 95–110)
CHLORIDE SERPL-SCNC: 117 MMOL/L (ref 95–110)
CLARITY UR REFRACT.AUTO: ABNORMAL
CO2 SERPL-SCNC: 13 MMOL/L (ref 23–29)
CO2 SERPL-SCNC: 13 MMOL/L (ref 23–29)
CO2 SERPL-SCNC: 14 MMOL/L (ref 23–29)
COLOR UR AUTO: ABNORMAL
CREAT SERPL-MCNC: 1.3 MG/DL (ref 0.5–1.4)
CREAT SERPL-MCNC: 1.4 MG/DL (ref 0.5–1.4)
CREAT SERPL-MCNC: 1.8 MG/DL (ref 0.5–1.4)
CV ECHO LV RWT: 0.25 CM
DELSYS: ABNORMAL
DELSYS: ABNORMAL
DIFFERENTIAL METHOD: ABNORMAL
DIFFERENTIAL METHOD: ABNORMAL
DOHLE BOD BLD QL SMEAR: PRESENT
DOHLE BOD BLD QL SMEAR: PRESENT
DOP CALC AO PEAK VEL: 0.76 M/S
DOP CALC AO VTI: 10.42 CM
DOP CALC LVOT AREA: 3.2 CM2
DOP CALC LVOT DIAMETER: 2.02 CM
DOP CALC LVOT PEAK VEL: 0.62 M/S
DOP CALC LVOT STROKE VOLUME: 29.44 CM3
DOP CALCLVOT PEAK VEL VTI: 9.19 CM
ECHO LV POSTERIOR WALL: 0.5 CM (ref 0.6–1.1)
EOSINOPHIL # BLD AUTO: 0.2 K/UL (ref 0–0.5)
EOSINOPHIL # BLD AUTO: 0.2 K/UL (ref 0–0.5)
EOSINOPHIL NFR BLD: 0.7 % (ref 0–8)
EOSINOPHIL NFR BLD: 0.7 % (ref 0–8)
ERYTHROCYTE [DISTWIDTH] IN BLOOD BY AUTOMATED COUNT: 16.2 % (ref 11.5–14.5)
ERYTHROCYTE [DISTWIDTH] IN BLOOD BY AUTOMATED COUNT: 16.2 % (ref 11.5–14.5)
ERYTHROCYTE [SEDIMENTATION RATE] IN BLOOD BY WESTERGREN METHOD: 18 MM/H
ERYTHROCYTE [SEDIMENTATION RATE] IN BLOOD BY WESTERGREN METHOD: 18 MM/H
EST. GFR  (AFRICAN AMERICAN): >60 ML/MIN/1.73 M^2
EST. GFR  (NON AFRICAN AMERICAN): 52.3 ML/MIN/1.73 M^2
EST. GFR  (NON AFRICAN AMERICAN): >60 ML/MIN/1.73 M^2
EST. GFR  (NON AFRICAN AMERICAN): >60 ML/MIN/1.73 M^2
ESTIMATED AVG GLUCOSE: 111 MG/DL (ref 68–131)
FIO2: 60
FIO2: 60
FRACTIONAL SHORTENING: 20 % (ref 28–44)
GLUCOSE SERPL-MCNC: 101 MG/DL (ref 70–110)
GLUCOSE SERPL-MCNC: 103 MG/DL (ref 70–110)
GLUCOSE SERPL-MCNC: 147 MG/DL (ref 70–110)
GLUCOSE UR QL STRIP: NEGATIVE
HBA1C MFR BLD HPLC: 5.5 % (ref 4–5.6)
HCO3 UR-SCNC: 11.2 MMOL/L (ref 24–28)
HCO3 UR-SCNC: 12.3 MMOL/L (ref 24–28)
HCO3 UR-SCNC: 13.4 MMOL/L (ref 24–28)
HCT VFR BLD AUTO: 35.1 % (ref 40–54)
HCT VFR BLD AUTO: 35.1 % (ref 40–54)
HGB BLD-MCNC: 11.7 G/DL (ref 14–18)
HGB BLD-MCNC: 11.7 G/DL (ref 14–18)
HGB UR QL STRIP: ABNORMAL
HYALINE CASTS UR QL AUTO: 7 /LPF
IMM GRANULOCYTES # BLD AUTO: 0.56 K/UL (ref 0–0.04)
IMM GRANULOCYTES # BLD AUTO: 0.56 K/UL (ref 0–0.04)
IMM GRANULOCYTES NFR BLD AUTO: 2.5 % (ref 0–0.5)
IMM GRANULOCYTES NFR BLD AUTO: 2.5 % (ref 0–0.5)
INTERVENTRICULAR SEPTUM: 0.6 CM (ref 0.6–1.1)
KETONES UR QL STRIP: NEGATIVE
LA MAJOR: 2.76 CM
LA WIDTH: 2.56 CM
LEFT ATRIUM SIZE: 2.15 CM
LEFT INTERNAL DIMENSION IN SYSTOLE: 3.2 CM (ref 2.1–4)
LEFT VENTRICLE DIASTOLIC VOLUME INDEX: 43.33 ML/M2
LEFT VENTRICLE DIASTOLIC VOLUME: 50.78 ML
LEFT VENTRICLE MASS INDEX: 49 G/M2
LEFT VENTRICLE SYSTOLIC VOLUME INDEX: 19.2 ML/M2
LEFT VENTRICLE SYSTOLIC VOLUME: 22.47 ML
LEFT VENTRICULAR INTERNAL DIMENSION IN DIASTOLE: 4 CM (ref 3.5–6)
LEFT VENTRICULAR MASS: 57.72 G
LEUKOCYTE ESTERASE UR QL STRIP: ABNORMAL
LYMPHOCYTES # BLD AUTO: 1.7 K/UL (ref 1–4.8)
LYMPHOCYTES # BLD AUTO: 1.7 K/UL (ref 1–4.8)
LYMPHOCYTES NFR BLD: 7.7 % (ref 18–48)
LYMPHOCYTES NFR BLD: 7.7 % (ref 18–48)
MAGNESIUM SERPL-MCNC: 2.5 MG/DL (ref 1.6–2.6)
MCH RBC QN AUTO: 29.7 PG (ref 27–31)
MCH RBC QN AUTO: 29.7 PG (ref 27–31)
MCHC RBC AUTO-ENTMCNC: 33.3 G/DL (ref 32–36)
MCHC RBC AUTO-ENTMCNC: 33.3 G/DL (ref 32–36)
MCV RBC AUTO: 89 FL (ref 82–98)
MCV RBC AUTO: 89 FL (ref 82–98)
MICROSCOPIC COMMENT: ABNORMAL
MIN VOL: 8.7
MODE: ABNORMAL
MODE: ABNORMAL
MONOCYTES # BLD AUTO: 2.5 K/UL (ref 0.3–1)
MONOCYTES # BLD AUTO: 2.5 K/UL (ref 0.3–1)
MONOCYTES NFR BLD: 11.3 % (ref 4–15)
MONOCYTES NFR BLD: 11.3 % (ref 4–15)
NEUTROPHILS # BLD AUTO: 17.3 K/UL (ref 1.8–7.7)
NEUTROPHILS # BLD AUTO: 17.3 K/UL (ref 1.8–7.7)
NEUTROPHILS NFR BLD: 77.1 % (ref 38–73)
NEUTROPHILS NFR BLD: 77.1 % (ref 38–73)
NITRITE UR QL STRIP: NEGATIVE
NRBC BLD-RTO: 0 /100 WBC
NRBC BLD-RTO: 0 /100 WBC
OB PNL STL: POSITIVE
PCO2 BLDA: 21.5 MMHG (ref 35–45)
PCO2 BLDA: 22 MMHG (ref 35–45)
PCO2 BLDA: 23.6 MMHG (ref 35–45)
PEEP: 5
PEEP: 5
PH SMN: 7.33 [PH] (ref 7.35–7.45)
PH SMN: 7.33 [PH] (ref 7.35–7.45)
PH SMN: 7.39 [PH] (ref 7.35–7.45)
PH UR STRIP: 5 [PH] (ref 5–8)
PHENOBARB SERPL-MCNC: 16.5 UG/DL (ref 15–40)
PHOSPHATE SERPL-MCNC: 5.4 MG/DL (ref 2.7–4.5)
PLATELET # BLD AUTO: 531 K/UL (ref 150–350)
PLATELET # BLD AUTO: 531 K/UL (ref 150–350)
PLATELET BLD QL SMEAR: ABNORMAL
PLATELET BLD QL SMEAR: ABNORMAL
PMV BLD AUTO: 10.6 FL (ref 9.2–12.9)
PMV BLD AUTO: 10.6 FL (ref 9.2–12.9)
PO2 BLDA: 183 MMHG (ref 80–100)
PO2 BLDA: 191 MMHG (ref 80–100)
PO2 BLDA: 259 MMHG (ref 80–100)
POC BE: -12 MMOL/L
POC BE: -14 MMOL/L
POC BE: -15 MMOL/L
POC SATURATED O2: 100 % (ref 95–100)
POC TCO2: 12 MMOL/L (ref 23–27)
POC TCO2: 13 MMOL/L (ref 23–27)
POC TCO2: 14 MMOL/L (ref 23–27)
POCT GLUCOSE: 103 MG/DL (ref 70–110)
POCT GLUCOSE: 120 MG/DL (ref 70–110)
POCT GLUCOSE: 123 MG/DL (ref 70–110)
POCT GLUCOSE: 123 MG/DL (ref 70–110)
POIKILOCYTOSIS BLD QL SMEAR: SLIGHT
POIKILOCYTOSIS BLD QL SMEAR: SLIGHT
POLYCHROMASIA BLD QL SMEAR: ABNORMAL
POLYCHROMASIA BLD QL SMEAR: ABNORMAL
POTASSIUM SERPL-SCNC: 2.5 MMOL/L (ref 3.5–5.1)
POTASSIUM SERPL-SCNC: 2.8 MMOL/L (ref 3.5–5.1)
POTASSIUM SERPL-SCNC: 3 MMOL/L (ref 3.5–5.1)
POTASSIUM SERPL-SCNC: 3.7 MMOL/L (ref 3.5–5.1)
PROT SERPL-MCNC: 6.3 G/DL (ref 6–8.4)
PROT UR QL STRIP: ABNORMAL
RA MAJOR: 2.54 CM
RA WIDTH: 2.33 CM
RBC # BLD AUTO: 3.94 M/UL (ref 4.6–6.2)
RBC # BLD AUTO: 3.94 M/UL (ref 4.6–6.2)
RBC #/AREA URNS AUTO: 13 /HPF (ref 0–4)
RIGHT VENTRICULAR END-DIASTOLIC DIMENSION: 2.31 CM
SAMPLE: ABNORMAL
SARS-COV-2 RDRP RESP QL NAA+PROBE: NEGATIVE
SINUS: 2.22 CM
SITE: ABNORMAL
SODIUM SERPL-SCNC: 133 MMOL/L (ref 136–145)
SODIUM SERPL-SCNC: 137 MMOL/L (ref 136–145)
SODIUM SERPL-SCNC: 138 MMOL/L (ref 136–145)
SP GR UR STRIP: 1.02 (ref 1–1.03)
SP02: 100
SP02: 100
SQUAMOUS #/AREA URNS AUTO: 1 /HPF
STJ: 2.62 CM
TOXIC GRANULES BLD QL SMEAR: PRESENT
TOXIC GRANULES BLD QL SMEAR: PRESENT
TRICUSPID ANNULAR PLANE SYSTOLIC EXCURSION: 1.26 CM
URN SPEC COLLECT METH UR: ABNORMAL
VANCOMYCIN SERPL-MCNC: 19.5 UG/ML
VANCOMYCIN SERPL-MCNC: NORMAL UG/ML
VT: 470
VT: 470
WBC # BLD AUTO: 22.4 K/UL (ref 3.9–12.7)
WBC # BLD AUTO: 22.4 K/UL (ref 3.9–12.7)
WBC #/AREA URNS AUTO: 18 /HPF (ref 0–5)

## 2020-08-26 PROCEDURE — 84132 ASSAY OF SERUM POTASSIUM: CPT

## 2020-08-26 PROCEDURE — 99900035 HC TECH TIME PER 15 MIN (STAT)

## 2020-08-26 PROCEDURE — 83735 ASSAY OF MAGNESIUM: CPT

## 2020-08-26 PROCEDURE — 20000000 HC ICU ROOM

## 2020-08-26 PROCEDURE — 99291 CRITICAL CARE FIRST HOUR: CPT | Mod: ,,, | Performed by: NURSE PRACTITIONER

## 2020-08-26 PROCEDURE — 85025 COMPLETE CBC W/AUTO DIFF WBC: CPT

## 2020-08-26 PROCEDURE — 25000003 PHARM REV CODE 250: Performed by: NURSE PRACTITIONER

## 2020-08-26 PROCEDURE — 36410 VNPNXR 3YR/> PHY/QHP DX/THER: CPT

## 2020-08-26 PROCEDURE — 94761 N-INVAS EAR/PLS OXIMETRY MLT: CPT

## 2020-08-26 PROCEDURE — 87077 CULTURE AEROBIC IDENTIFY: CPT | Mod: 59

## 2020-08-26 PROCEDURE — 27000221 HC OXYGEN, UP TO 24 HOURS

## 2020-08-26 PROCEDURE — 95816 PR EEG,W/AWAKE & DROWSY RECORD: ICD-10-PCS | Mod: 26,,, | Performed by: PSYCHIATRY & NEUROLOGY

## 2020-08-26 PROCEDURE — 25000003 PHARM REV CODE 250

## 2020-08-26 PROCEDURE — 80048 BASIC METABOLIC PNL TOTAL CA: CPT

## 2020-08-26 PROCEDURE — 87040 BLOOD CULTURE FOR BACTERIA: CPT

## 2020-08-26 PROCEDURE — C1751 CATH, INF, PER/CENT/MIDLINE: HCPCS

## 2020-08-26 PROCEDURE — 87186 SC STD MICRODIL/AGAR DIL: CPT | Mod: 59

## 2020-08-26 PROCEDURE — 36415 COLL VENOUS BLD VENIPUNCTURE: CPT

## 2020-08-26 PROCEDURE — 87086 URINE CULTURE/COLONY COUNT: CPT

## 2020-08-26 PROCEDURE — 82272 OCCULT BLD FECES 1-3 TESTS: CPT

## 2020-08-26 PROCEDURE — 25000003 PHARM REV CODE 250: Performed by: PSYCHIATRY & NEUROLOGY

## 2020-08-26 PROCEDURE — 63600175 PHARM REV CODE 636 W HCPCS: Performed by: PSYCHIATRY & NEUROLOGY

## 2020-08-26 PROCEDURE — 80053 COMPREHEN METABOLIC PANEL: CPT

## 2020-08-26 PROCEDURE — 95816 EEG AWAKE AND DROWSY: CPT | Mod: 26,,, | Performed by: PSYCHIATRY & NEUROLOGY

## 2020-08-26 PROCEDURE — 80202 ASSAY OF VANCOMYCIN: CPT | Mod: 91

## 2020-08-26 PROCEDURE — 84100 ASSAY OF PHOSPHORUS: CPT

## 2020-08-26 PROCEDURE — U0002 COVID-19 LAB TEST NON-CDC: HCPCS

## 2020-08-26 PROCEDURE — 36600 WITHDRAWAL OF ARTERIAL BLOOD: CPT

## 2020-08-26 PROCEDURE — 27200966 HC CLOSED SUCTION SYSTEM

## 2020-08-26 PROCEDURE — 82803 BLOOD GASES ANY COMBINATION: CPT

## 2020-08-26 PROCEDURE — 80048 BASIC METABOLIC PNL TOTAL CA: CPT | Mod: 91

## 2020-08-26 PROCEDURE — 99291 PR CRITICAL CARE, E/M 30-74 MINUTES: ICD-10-PCS | Mod: ,,, | Performed by: NURSE PRACTITIONER

## 2020-08-26 PROCEDURE — 99900026 HC AIRWAY MAINTENANCE (STAT)

## 2020-08-26 PROCEDURE — 76937 US GUIDE VASCULAR ACCESS: CPT

## 2020-08-26 PROCEDURE — 87070 CULTURE OTHR SPECIMN AEROBIC: CPT

## 2020-08-26 PROCEDURE — 94002 VENT MGMT INPAT INIT DAY: CPT

## 2020-08-26 PROCEDURE — 95813 EEG EXTND MNTR 61-119 MIN: CPT

## 2020-08-26 PROCEDURE — 83036 HEMOGLOBIN GLYCOSYLATED A1C: CPT

## 2020-08-26 PROCEDURE — 80184 ASSAY OF PHENOBARBITAL: CPT

## 2020-08-26 PROCEDURE — 81001 URINALYSIS AUTO W/SCOPE: CPT

## 2020-08-26 PROCEDURE — 87205 SMEAR GRAM STAIN: CPT

## 2020-08-26 RX ORDER — PHENOBARBITAL 20 MG/5ML
100 ELIXIR ORAL NIGHTLY
Status: DISCONTINUED | OUTPATIENT
Start: 2020-08-26 | End: 2020-08-26

## 2020-08-26 RX ORDER — ONDANSETRON 2 MG/ML
4 INJECTION INTRAMUSCULAR; INTRAVENOUS EVERY 8 HOURS PRN
Status: DISCONTINUED | OUTPATIENT
Start: 2020-08-26 | End: 2020-09-18 | Stop reason: HOSPADM

## 2020-08-26 RX ORDER — SODIUM BICARBONATE 1 MEQ/ML
SYRINGE (ML) INTRAVENOUS
Status: COMPLETED
Start: 2020-08-26 | End: 2020-08-26

## 2020-08-26 RX ORDER — BALSAM PERU/CASTOR OIL
OINTMENT (GRAM) TOPICAL 2 TIMES DAILY
Status: DISCONTINUED | OUTPATIENT
Start: 2020-08-26 | End: 2020-09-01

## 2020-08-26 RX ORDER — POTASSIUM CHLORIDE 1.5 G/1.58G
40 POWDER, FOR SOLUTION ORAL ONCE
Status: COMPLETED | OUTPATIENT
Start: 2020-08-26 | End: 2020-08-26

## 2020-08-26 RX ORDER — FAMOTIDINE 20 MG/1
20 TABLET, FILM COATED ORAL DAILY
Status: DISCONTINUED | OUTPATIENT
Start: 2020-08-26 | End: 2020-08-26

## 2020-08-26 RX ORDER — SODIUM CHLORIDE 0.9 % (FLUSH) 0.9 %
10 SYRINGE (ML) INJECTION
Status: DISCONTINUED | OUTPATIENT
Start: 2020-08-26 | End: 2020-09-18 | Stop reason: HOSPADM

## 2020-08-26 RX ORDER — FAMOTIDINE 20 MG/1
20 TABLET, FILM COATED ORAL DAILY
Status: DISCONTINUED | OUTPATIENT
Start: 2020-08-27 | End: 2020-08-29

## 2020-08-26 RX ORDER — DEXMEDETOMIDINE HYDROCHLORIDE 4 UG/ML
0.2 INJECTION, SOLUTION INTRAVENOUS CONTINUOUS
Status: DISCONTINUED | OUTPATIENT
Start: 2020-08-26 | End: 2020-08-27

## 2020-08-26 RX ORDER — BACITRACIN 500 [USP'U]/G
OINTMENT TOPICAL DAILY
Status: DISCONTINUED | OUTPATIENT
Start: 2020-08-26 | End: 2020-09-01

## 2020-08-26 RX ORDER — NOREPINEPHRINE BITARTRATE/D5W 4MG/250ML
0.02 PLASTIC BAG, INJECTION (ML) INTRAVENOUS CONTINUOUS
Status: DISCONTINUED | OUTPATIENT
Start: 2020-08-26 | End: 2020-08-26

## 2020-08-26 RX ORDER — PHENYLEPHRINE HCL IN 0.9% NACL 20MG/250ML
0.5 PLASTIC BAG, INJECTION (ML) INTRAVENOUS CONTINUOUS
Status: DISCONTINUED | OUTPATIENT
Start: 2020-08-26 | End: 2020-08-26

## 2020-08-26 RX ORDER — BACLOFEN 10 MG/1
10 TABLET ORAL 3 TIMES DAILY
Status: DISCONTINUED | OUTPATIENT
Start: 2020-08-26 | End: 2020-08-31

## 2020-08-26 RX ORDER — ACETAMINOPHEN 325 MG/1
650 TABLET ORAL EVERY 4 HOURS PRN
Status: DISCONTINUED | OUTPATIENT
Start: 2020-08-26 | End: 2020-09-18 | Stop reason: HOSPADM

## 2020-08-26 RX ORDER — INDOMETHACIN 25 MG/1
50 CAPSULE ORAL ONCE
Status: COMPLETED | OUTPATIENT
Start: 2020-08-26 | End: 2020-08-26

## 2020-08-26 RX ORDER — PHENOBARBITAL 20 MG/5ML
100 ELIXIR ORAL NIGHTLY
Status: DISCONTINUED | OUTPATIENT
Start: 2020-08-26 | End: 2020-09-18 | Stop reason: HOSPADM

## 2020-08-26 RX ORDER — AMOXICILLIN 250 MG
1 CAPSULE ORAL 2 TIMES DAILY
Status: DISCONTINUED | OUTPATIENT
Start: 2020-08-26 | End: 2020-08-26

## 2020-08-26 RX ORDER — SODIUM BICARBONATE 1 MEQ/ML
25 SYRINGE (ML) INTRAVENOUS ONCE
Status: COMPLETED | OUTPATIENT
Start: 2020-08-26 | End: 2020-08-26

## 2020-08-26 RX ORDER — PROPRANOLOL HYDROCHLORIDE 10 MG/1
20 TABLET ORAL 3 TIMES DAILY
Status: DISCONTINUED | OUTPATIENT
Start: 2020-08-26 | End: 2020-08-31

## 2020-08-26 RX ORDER — DEXMEDETOMIDINE HYDROCHLORIDE 4 UG/ML
0.2 INJECTION, SOLUTION INTRAVENOUS CONTINUOUS
Status: DISCONTINUED | OUTPATIENT
Start: 2020-08-26 | End: 2020-08-26

## 2020-08-26 RX ORDER — SODIUM CHLORIDE 9 MG/ML
INJECTION, SOLUTION INTRAVENOUS CONTINUOUS
Status: DISCONTINUED | OUTPATIENT
Start: 2020-08-26 | End: 2020-08-28

## 2020-08-26 RX ORDER — POLYETHYLENE GLYCOL 3350 17 G/17G
17 POWDER, FOR SOLUTION ORAL DAILY
Status: DISCONTINUED | OUTPATIENT
Start: 2020-08-26 | End: 2020-08-26

## 2020-08-26 RX ADMIN — Medication 25 MEQ: at 02:08

## 2020-08-26 RX ADMIN — BACLOFEN 10 MG: 10 TABLET ORAL at 09:08

## 2020-08-26 RX ADMIN — DEXMEDETOMIDINE HYDROCHLORIDE 0.2 MCG/KG/HR: 100 INJECTION, SOLUTION, CONCENTRATE INTRAVENOUS at 02:08

## 2020-08-26 RX ADMIN — PHENOBARBITAL 100 MG: 20 ELIXIR ORAL at 09:08

## 2020-08-26 RX ADMIN — SODIUM CHLORIDE 1000 ML: 0.9 INJECTION, SOLUTION INTRAVENOUS at 09:08

## 2020-08-26 RX ADMIN — SODIUM BICARBONATE 50 MEQ: 84 INJECTION, SOLUTION INTRAVENOUS at 05:08

## 2020-08-26 RX ADMIN — VANCOMYCIN HYDROCHLORIDE 500 MG: 500 INJECTION, POWDER, LYOPHILIZED, FOR SOLUTION INTRAVENOUS at 03:08

## 2020-08-26 RX ADMIN — Medication 0.02 MCG/KG/MIN: at 03:08

## 2020-08-26 RX ADMIN — BACLOFEN 10 MG: 10 TABLET ORAL at 02:08

## 2020-08-26 RX ADMIN — SODIUM CHLORIDE 250 ML: 0.9 INJECTION, SOLUTION INTRAVENOUS at 06:08

## 2020-08-26 RX ADMIN — PIPERACILLIN SODIUM AND TAZOBACTAM SODIUM 4.5 G: 4; .5 INJECTION, POWDER, LYOPHILIZED, FOR SOLUTION INTRAVENOUS at 10:08

## 2020-08-26 RX ADMIN — VANCOMYCIN HYDROCHLORIDE 500 MG: 500 INJECTION, POWDER, LYOPHILIZED, FOR SOLUTION INTRAVENOUS at 07:08

## 2020-08-26 RX ADMIN — PROPRANOLOL HYDROCHLORIDE 20 MG: 10 TABLET ORAL at 02:08

## 2020-08-26 RX ADMIN — PIPERACILLIN SODIUM AND TAZOBACTAM SODIUM 4.5 G: 4; .5 INJECTION, POWDER, LYOPHILIZED, FOR SOLUTION INTRAVENOUS at 02:08

## 2020-08-26 RX ADMIN — CASTOR OIL AND BALSAM, PERU: 788; 87 OINTMENT TOPICAL at 05:08

## 2020-08-26 RX ADMIN — SODIUM CHLORIDE 250 ML: 0.9 INJECTION, SOLUTION INTRAVENOUS at 02:08

## 2020-08-26 RX ADMIN — ACETAMINOPHEN 650 MG: 325 TABLET ORAL at 03:08

## 2020-08-26 RX ADMIN — SODIUM CHLORIDE: 0.9 INJECTION, SOLUTION INTRAVENOUS at 03:08

## 2020-08-26 RX ADMIN — MICONAZOLE NITRATE: 20 OINTMENT TOPICAL at 09:08

## 2020-08-26 RX ADMIN — HUMAN ALBUMIN MICROSPHERES AND PERFLUTREN 0.66 MG: 10; .22 INJECTION, SOLUTION INTRAVENOUS at 11:08

## 2020-08-26 RX ADMIN — BACITRACIN: 500 OINTMENT TOPICAL at 05:08

## 2020-08-26 RX ADMIN — DEXMEDETOMIDINE HYDROCHLORIDE 0.6 MCG/KG/HR: 100 INJECTION, SOLUTION, CONCENTRATE INTRAVENOUS at 08:08

## 2020-08-26 RX ADMIN — PROPRANOLOL HYDROCHLORIDE 20 MG: 10 TABLET ORAL at 09:08

## 2020-08-26 RX ADMIN — POTASSIUM CHLORIDE 40 MEQ: 1.5 POWDER, FOR SOLUTION ORAL at 08:08

## 2020-08-26 RX ADMIN — PHENOBARBITAL 100 MG: 20 ELIXIR ORAL at 03:08

## 2020-08-26 RX ADMIN — SODIUM BICARBONATE 25 MEQ: 84 INJECTION, SOLUTION INTRAVENOUS at 02:08

## 2020-08-26 NOTE — HOSPITAL COURSE
8/26: Admit NCC: BC, antibiotics, EEG, Levo  08/27/2020 Extubated on 8/27/20. Tube feeds were started. Awaiting Urine culture. Patient had multiple episodes of diarrhea and had an extensive antibiotic history before admission, so will collect C diff. EEG does not show any epileptiform activity or discharge. Patient extubated and started on tube feeds.   08/28/2020 Procal was elevated. Infectious workup sent which showed his c diff, and urine culture being negative. His respiratory culture showing serratia marcesens as well as pseudomonas. Patient was already started on zosyn. Per mother he is back to his baseline and does not have any ICU needs.   8/29/2020: Procal back down, d/c'd NS with KCl d/t Na increase, started enteric water flushes 150cc q6h and one time bolus 250cc, d/c'd zosyn and narrowed to cipro for PNA  8/30/2020:   8/31/2020: Started on tube feeds. Chest physiotherapy and cough assist  9/1/2020: Still with coarse breath sounds, holding water flushes until Na stable. Upper/lower DVT scan (screening).  9/2/2020 Left basilic vein thrombosis around midline, will be removed today. Was given half propanolol dose overnight d/t low BP. Now on RA. Stable and ready for step down.

## 2020-08-26 NOTE — RESPIRATORY THERAPY
Received patient from EMS and placed on NCC ventilator at documented settings on flow sheet. ETT secured and patent. AMBU bag and mask at bedside. Awaiting orders and will continue to monitor.

## 2020-08-26 NOTE — ASSESSMENT & PLAN NOTE
- at baseline he is confused but able to interact and unable to walk  - found unresponsive   - EEG pending   - Metabolic acidosis

## 2020-08-26 NOTE — PLAN OF CARE
[facility] Care Plan    POC reviewed with Glen Moscoso and pt mother at 1600. Pt unable to verbalize understanding due to ETT and LOC. Pt mother verbalized understanding. Questions and concerns addressed. No acute events today. Pt on spot EEG today. Off Levo. Continuing precedex for agitation. Pt received 1L NS bolus. Pt remained tachycardic for majority of shift. Weaning vent as tolerated, on SPONT for majority of afternoon. OGT confirmed to use via KUB. Iglesias in place, low UO, small improvement following fluid bolus. Small watery BM today. IO remains in place, midline placed, PIV in place. Had issues with lab draws throughout shift, results this afternoon showed low K+ and CO2, MD Kaley with NCC notified, orders to draw new BMP and ABG. MD Kaley called back with ABG results. MD Kaley and AMARI Shah with NCC at bedside, awaiting vent orders to adjust. WC saw pt. WC done. Bath given. Will continue to monitor. See below and flowsheets for full assessment and VS info.       Neuro:  [place] Coma Scale  Best Eye Response: 3-->(E3) to speech  Best Motor Response: 5-->(M5) localizes pain  Best Verbal Response: 1-->(V1) none  [place] Coma Scale Score: 9  Assessment Qualifiers: patient intubated, patient chemically sedated or paralyzed, no eye obstruction present        24 hr Temp:  [97.9 °F (36.6 °C)-99.4 °F (37.4 °C)]     CV:   Rhythm: normal sinus rhythm-tachycardic  BP goals:   MAP > 65    Resp:   O2 Device (Oxygen Therapy): ventilator  Vent Mode: Spont  Set Rate: 18 BPM  Oxygen Concentration (%): 40  Vt Set: 470 mL  PEEP/CPAP: 5 cmH20  Pressure Support: 10 cmH20    Plan: wean to extubate, weaning as tolerated, plan to extubate tomorrow     GI/:  JUSTO Total Score: 0  Diet/Nutrition Received: NPO  Last Bowel Movement: 08/26/20  Voiding Characteristics: urethral catheter (bladder)    Intake/Output Summary (Last 24 hours) at 8/26/2020 1749  Last data filed at 8/26/2020 1705  Gross per 24 hour   Intake 2803.65 ml    Output 575 ml   Net 2228.65 ml     Unmeasured Output  Stool Occurrence: 1  Pad Count: 1    Labs/Accuchecks:  Recent Labs   Lab 08/26/20  0303   WBC 22.40*  22.40*   RBC 3.94*  3.94*   HGB 11.7*  11.7*   HCT 35.1*  35.1*   *  531*      Recent Labs   Lab 08/26/20  0532 08/26/20  1606   * 138   K 3.7 2.8*   CO2 13* 14*    117*   BUN 47* 46*   CREATININE 1.8* 1.4   ALKPHOS 101  --    ALT 22  --    AST 27  --    BILITOT 0.4  --     No results for input(s): PROTIME, INR, APTT, HEPANTIXA in the last 168 hours. No results for input(s): CPK, CPKMB, TROPONINI, MB in the last 168 hours.    Electrolytes: No replacement orders--NCC aware of labs  Accuchecks: Q6H    Gtts:   sodium chloride 0.9% 50 mL/hr at 08/26/20 1705    dexmedetomidine (PRECEDEX) infusion 0.6 mcg/kg/hr (08/26/20 1705)       LDA/Wounds:  Lines/Drains/Airways       Drain                   Gastrostomy/Enterostomy 08/04/20 1653 feeding 22 days         NG/OG Tube 08/26/20 0200 [place] sump less than 1 day         Urethral Catheter 08/26/20 0300 16 Fr. less than 1 day              Airway                   Airway - Non-Surgical Endotracheal Tube -- days              Intraosseous Line                   Intraosseous Line 08/26/20 0203 Tibia less than 1 day              Peripheral Intravenous Line                   Midline Catheter Insertion/Assessment  - Single Lumen 08/26/20 1145 Left brachial vein 18g x 8cm less than 1 day         Peripheral IV - Single Lumen 08/26/20 1034 22 G Anterior;Left;Proximal Forearm less than 1 day                  Wounds: Yes  Wound care consulted: Yes

## 2020-08-26 NOTE — PLAN OF CARE
Admit Date:  8/26/2020  1:47 AM      Admit Diagnosis:  Status epilepticus [G40.901]    Patient admitted from [facility]     CM met with patient and mother, Leni Moscoso (mother) [phone],  in room for [place] Planning Assessment.  Patient is non-verbal at baseline and unable to answer questions.  Per mother, the patient lives with mother and grandmother in a mobile home with a ramp to enter.   Per mother, the patient is total care and non verbal at baseline. The patient has a peg tube for feedings.   Mother stated that the patient has a PCA with [facility] [place],  His  is Dora Marrufo.  Per mother the patient has  2 PCA's from 7:30-8:30 M-F and every other weekend.  Patient is current with [facility] and per mother, she would like to continue.  Patient will have assistance from mother and PCA upon discharge.   Discharge Planning Booklet given to patient/family and discussed.  All questions addressed.  CM will follow for needs.     08/26/20 4266   Discharge Assessment   Assessment Type Discharge Planning Assessment   Confirmed/corrected address and phone number on facesheet? Yes   Assessment information obtained from? Caregiver  (Mother, Leni Moscoso)   Expected Length of Stay (days) 7   Communicated expected length of stay with patient/caregiver yes   Prior to hospitilization cognitive status: Unable to Assess  (Nonverbal at baseline)   Prior to hospitalization functional status: Completely Dependent   Current cognitive status: Unable to Assess   Current Functional Status: Completely Dependent   Lives With parent(s);grandparent(s)  (Mother and grandmother)   Able to Return to Prior Arrangements yes   Is patient able to care for self after discharge? No   Who are your caregiver(s) and their phone number(s)? Leni Moscoso (mother) [phone]   Patient's perception of discharge disposition other (comments)  (lalo)   Readmission Within the Last 30 Days no previous admission in  last 30 days   Patient currently being followed by outpatient case management? No   Patient currently receives any other outside agency services? Yes   How many hours a day does the patient receive services? 12   Name and contact number of agency or person providing outside services Cristóbal Mary   Equipment Currently Used at Home wheelchair;hospital bed;nebulizer   Do you have any problems affording any of your prescribed medications? No   Is the patient taking medications as prescribed? yes   Does the patient have transportation home? Yes   Transportation Anticipated family or friend will provide   Does the patient receive services at the [facility]? No   Discharge Plan A Home with family;Home Health   Discharge Plan B Home with family;Home Health   DME Needed Upon Discharge  none   Patient/Family in Agreement with Plan yes                PCP:  Yadi Lawsno MD  [phone]        Pharmacy:    [address]  [id number] [place] [address]  Phone: [phone] Fax: [phone]        Emergency Contacts:  Extended Emergency Contact Information  Primary Emergency Contact: Leni Moscoso  Address: [address] [place] [address]  Home Phone: [phone]  Work Phone: [phone]  Mobile Phone: [phone]  Relation: Mother  Secondary Emergency Contact: Marie Moscoso   [place]  Mobile Phone: [phone]  Relation: Grandparent      Insurance:    Payor: MEDICAID / Plan: MEDICAID OF [place] / Product Type: Government /     Xochitl Hernandez RN, CCRN-K, [facility]  Neuro-Critical Care   X [phone]    08/26/2020  4:31 PM

## 2020-08-26 NOTE — CONSULTS
Midline placed  in left brachial, size [id number] Lot# [id number] Removal date on or before 9/24/20. Needle advanced into vessel with real time ultrasound guidance. Image recorded and saved.

## 2020-08-26 NOTE — PROGRESS NOTES
Pharmacokinetic Assessment Follow Up: IV Vancomycin    Vancomycin serum concentration assessment(s):    Vancomycin random level resulted at 19.5 mcg/mL approximately 12 hours after the previous dose. Goal level is 10 to 20 mcg/mL.      Drug levels (last 3 results):  Recent Labs   Lab Result Units 08/26/20  1504 08/26/20  1606   Vancomycin, Random ug/mL SEE COMMENT 19.5     Vancomycin Regimen Plan:    Administer vancomycin  mg and redose when the random level is less than 20 mcg/mL. Next level to be drawn on 8/27 1300.     Pharmacy will continue to follow and monitor vancomycin.    Please contact pharmacy at extension [phone] for questions regarding this assessment.    Thank you for the consult,   Crystal Keith, PharmD, BCCCP             Patient brief summary:  Glen Moscoso is a 24 y.o. male initiated on antimicrobial therapy with IV vancomycin for treatment of sepsis    Drug Allergies:   Review of patient's allergies indicates:  No Known Allergies    Actual Body Weight:   33.1 kg     Renal Function:   Estimated Creatinine Clearance: 38.7 mL/min (based on SCr of 1.4 mg/dL).    Dialysis Method (if applicable):  N/A    CBC (last 72 hours):  Recent Labs   Lab Result Units 08/26/20  0303 08/26/20  1149   WBC K/uL 22.40*  22.40*  --    Hemoglobin g/dL 11.7*  11.7*  --    Hemoglobin A1C %  --  5.5   Hematocrit % 35.1*  35.1*  --    Platelets K/uL 531*  531*  --    Gran% % 77.1*  77.1*  --    Lymph% % 7.7*  7.7*  --    Mono% % 11.3  11.3  --    Eosinophil% % 0.7  0.7  --    Basophil% % 0.7  0.7  --    Differential Method  Automated  Automated  --        Metabolic Panel (last 72 hours):  Recent Labs   Lab Result Units 08/26/20  0532 08/26/20  0915 08/26/20  1606   Sodium mmol/L 133*  --  138   Potassium mmol/L 3.7  --  2.8*   Chloride mmol/L 109  --  117*   CO2 mmol/L 13*  --  14*   Glucose mg/dL 147*  --  101   Glucose, UA   --  Negative  --    BUN, Bld mg/dL 47*  --  46*   Creatinine mg/dL 1.8*  --  1.4    Albumin g/dL 1.9*  --   --    Total Bilirubin mg/dL 0.4  --   --    Alkaline Phosphatase U/L 101  --   --    AST U/L 27  --   --    ALT U/L 22  --   --    Magnesium mg/dL 2.5  --   --    Phosphorus mg/dL 5.4*  --   --        Vancomycin Administrations:  vancomycin given in the last 96 hours                   vancomycin (VANCOCIN) 500 mg in dextrose 5 % 100 mL IVPB (mg) 500 mg New Bag 08/26/20 0359                Microbiologic Results:  Microbiology Results (last 7 days)     Procedure Component Value Units Date/Time    Blood culture [[id number]] Collected: 08/26/20 0318    Order Status: Completed Specimen: Blood from Peripheral, Upper Arm, Left Updated: 08/26/20 1115     Blood Culture, Routine No Growth to date    Narrative:      Blood cultures x 2 different sites. 4 bottles total. Please  draw cultures before administering antibiotics.    Blood culture [[id number]] Collected: 08/26/20 0305    Order Status: Completed Specimen: Blood from Peripheral, Antecubital, Right Updated: 08/26/20 1115     Blood Culture, Routine No Growth to date    Narrative:      Blood cultures from 2 different sites. 4 bottles total.  Please draw before starting antibiotics.    Urine culture [[id number]] Collected: 08/26/20 0915    Order Status: No result Specimen: Urine Updated: 08/26/20 0952    Culture, Respiratory with Gram Stain [[id number]] Collected: 08/26/20 0505    Order Status: Completed Specimen: Respiratory from Bronchial Wash Updated: 08/26/20 0917     Gram Stain (Respiratory) <10 epithelial cells per low power field.     Gram Stain (Respiratory) Moderate WBC's     Gram Stain (Respiratory) Many Gram negative rods     Gram Stain (Respiratory) Many Gram negative diplococci    Narrative:      Mini-BAL.

## 2020-08-26 NOTE — PLAN OF CARE
(Physician in Lead of Transfers)   Outside Transfer Acceptance Note / Regional Referral Center    Name: Glen Moscoso    Transferring Physician: Dr. Lefort///Emergency Medicine    Date of Acceptance:  August 25, 2020    [facility]    Destination Facility and Admitting Physician[facility]Dr. Marina    Reason for Transfer:  Higher level of care    Report from Transferring Physician/Hospital course:  24-year-old male with history of cerebral palsy and seizure disorder.  Family reported he has been having persistent diarrhea.  He has been more lethargic than usual as well.  This afternoon he was found to have agonal respirations.  EMS was notified, and he was intubated.  He was brought to the emergency department where he had persistent diarrhea.  Stool for occult blood was positive.  CT of the head had no acute abnormalities.  Labs were consistent with metabolic acidosis but he had a normal lactic acid.  He remained unresponsive with no movement in the emergency department.  There was concern for possible status epilepticus.  Case was discussed with neurocritical care (Dr. Marina).  Patient will be transferred to [facility] neurocritical care unit for continuous EEG and management of respiratory failure.    [facility] hypokalemia, colitis, left buttock decubitus ulcer, fever of uncertain etiology, aspiration pneumonitis, leukocytosis, diarrhea, tachypnea, sinus tachycardia.  Serum bicarbonate August 11-18 ranged 13-17.  Multiple cultures to include respiratory cultures, blood cultures, and stool cultures (including C diff) did not have acute abnormalities.  COVID on August 9 was negative.  He was treated with p.o. vancomycin along with Levaquin and Flagyl.    VS:  Temperature 99°, pulse 114, blood pressure 103/68, oxygen saturation 100%.    Labs:  Urine drug screen positive for barbiturates but otherwise negative.   Urinalysis with 2+ protein, 2+ blood, many bacteria, positive nitrite, serum ketone negative, salicylate level less than 5, alcohol level less than 10, TSH 2.655, white blood cell count 25.4, hemoglobin 12.3, hematocrit 40.4, platelet count 477, sodium 132, potassium 3.7, CO2 15, BUN 40, creatinine 1.91, glucose 95, AST 28, ALT 22, lactic acid 1.05, PT 13.2, PTT 24.7, troponin 0, phenobarbital level 19.72    ABG with pH 7.24, pCO2 19, PO2 100    Radiographs:  CT head showed dilated dysplastic changes of the ventricles with a thinned corpus callosum but no evidence for acute intracranial abnormality.    Chest x-ray report noted satisfactory position of the endotracheal tube.  Lungs grossly clear.    To Do List: Admit to NCC      Upon patient arrival to the neurocritical care unit, please contact neurocritical care physician on-call.     Patient will need rapid COVID testing on arrival to the neurocritical care unit.    ALLEN Orellana MD  Hospital Medicine Staff  Cell: [phone]

## 2020-08-26 NOTE — HPI
Glen Moscoso is a 23 yo M with a PMH of CP, Seizures, and recent admission for hyponatremia who presents to [facility] for encephalopathy, sepsis, diarrhea, and possible seizure. He presented to OSH after being found unresponsive and in  Resp distress. He was found down by his mother upon EMS arrival he had agonal resp 3-4 per min, he was intubated by EMS and take to OSH ED. He was hypotensive on arrival started on Levo gtt, received antibiotics, ABG showed metabolic acidosis. He is being admitted to [facility] for a higher level of care, EGG and close monitoring.

## 2020-08-26 NOTE — PROGRESS NOTES
Pt urine output less than 3ml over 3 hours. Iglesias flushed and bladder assessed by bladder scanner both WNL, BUN 47, Creat 1.8 Notified WALLACE Perez NP from [facility]. 250ml NS bolus ordered. Will continue to monitor closely.

## 2020-08-26 NOTE — PLAN OF CARE
08/26/20 1101   Post-Acute Status   Post-Acute Authorization Placement   Post-Acute Placement Status Awaiting Internal Medical Clearance  (vent)     Lauren Chrisyt LCSW  Neurocritical Care   [facility]  [id number]

## 2020-08-26 NOTE — PROGRESS NOTES
Pharmacokinetic Initial Assessment: IV Vancomycin    Assessment/Plan:    Initiate intravenous vancomycin with loading dose of 500 mg once with subsequent doses when random concentrations are less than 20 mcg/mL  Desired empiric serum trough concentration is 10 to 20 mcg/mL  Draw vancomycin random level on 8/26 at 1600.  Pharmacy will continue to follow and monitor vancomycin.      Please contact pharmacy at extension [phone] with any questions regarding this assessment.     Thank you for the consult,   Brenda Paris       Patient brief summary:  Glen Moscoso is a 24 y.o. male initiated on antimicrobial therapy with IV Vancomycin for treatment of suspected bacteremia    Drug Allergies:   Review of patient's allergies indicates:  No Known Allergies    Actual Body Weight:   33.2 kg    Renal Function:   Estimated Creatinine Clearance: 30.2 mL/min (A) (based on SCr of 1.8 mg/dL (H))., patient in [facility]    Dialysis Method (if applicable):  N/A    CBC (last 72 hours):  Recent Labs   Lab Result Units 08/26/20  0303   WBC K/uL 22.40*  22.40*   Hemoglobin g/dL 11.7*  11.7*   Hematocrit % 35.1*  35.1*   Platelets K/uL 531*  531*   Gran% % 77.1*  77.1*   Lymph% % 7.7*  7.7*   Mono% % 11.3  11.3   Eosinophil% % 0.7  0.7   Basophil% % 0.7  0.7   Differential Method  Automated  Automated       Metabolic Panel (last 72 hours):  Recent Labs   Lab Result Units 08/26/20  0532   Sodium mmol/L 133*   Potassium mmol/L 3.7   Chloride mmol/L 109   CO2 mmol/L 13*   Glucose mg/dL 147*   BUN, Bld mg/dL 47*   Creatinine mg/dL 1.8*   Albumin g/dL 1.9*   Total Bilirubin mg/dL 0.4   Alkaline Phosphatase U/L 101   AST U/L 27   ALT U/L 22   Magnesium mg/dL 2.5   Phosphorus mg/dL 5.4*       Drug levels (last 3 results):  No results for input(s): VANCOMYCINRA, VANCOMYCINPE, VANCOMYCINTR in the last 72 hours.    Microbiologic Results:  Microbiology Results (last 7 days)     Procedure Component Value Units Date/Time    Culture, Respiratory with Gram  Stain [[id number]] Collected: 08/26/20 0505    Order Status: Sent Specimen: Respiratory from Bronchial Wash Updated: 08/26/20 0537    Blood culture [[id number]] Collected: 08/26/20 0318    Order Status: Sent Specimen: Blood from Peripheral, Upper Arm, Left Updated: 08/26/20 0415    Blood culture [[id number]] Collected: 08/26/20 0305    Order Status: Sent Specimen: Blood from Peripheral, Antecubital, Right Updated: 08/26/20 0330

## 2020-08-26 NOTE — NURSING
Patient arrived to [id number]  Type of stroke/diagnosis:  seizures    Current symptoms: intubated, paraplegic, withdrawal BLE, spont BUE, IO in place, blood stool- occult sample obtained, tachycardic     Skin assessment done: Y/N  Wounds noted: DTI R buttocks, DTI left buttocks with pressure wound, 2 wounds to posterior scrotum, rash to coccyx, scab to R thumb/wrist area  JUSTO Armband Applied: yes  Patient Belongings on Admit: (be specific) - mother/caregiver at bedside    NCC notified: mitch fregoso

## 2020-08-26 NOTE — SUBJECTIVE & OBJECTIVE
Past Medical History:   Diagnosis Date    Acid reflux     Cerebral palsy     History of hip surgery     S/P percutaneous endoscopic gastrostomy (PEG) tube placement     Seizures      History reviewed. No pertinent surgical history.   No current facility-administered medications on file prior to encounter.      Current Outpatient Medications on File Prior to Encounter   Medication Sig Dispense Refill    baclofen (LIORESAL) 10 MG tablet Take 10 mg by mouth 3 (three) times daily.      cholestyramine (QUESTRAN) 4 gram packet Take 1 packet (4 g total) by mouth 2 (two) times daily. 60 packet 0    diphenhydrAMINE (BENADRYL) 12.5 mg/5 mL elixir Take by mouth 4 (four) times daily as needed for Allergies.      gabapentin (NEURONTIN) 250 mg/5 mL solution Take by mouth nightly. At bedtime      ibuprofen (ADVIL,MOTRIN) 100 mg/5 mL suspension Take by mouth every 6 (six) hours as needed for Temperature greater than.      Lactobacillus rhamnosus GG (CULTURELLE) 10 billion cell capsule 1 capsule by Per G Tube route once daily.      nystatin (MYCOSTATIN) cream Apply topically 2 (two) times daily.      PHENobarbitaL 20 mg/5 mL (4 mg/mL) Elix elixir Take 25 mLs (100 mg total) by mouth every evening. 750 mL 0    propranoloL (INDERAL) 20 MG tablet Take 1 tablet (20 mg total) by mouth 3 (three) times daily. 90 tablet 0      Allergies: Patient has no known allergies.    History reviewed. No pertinent family history.  Social History     Tobacco Use    Smoking status: Never Smoker    Smokeless tobacco: Never Used   Substance Use Topics    Alcohol use: Never     Frequency: Never    Drug use: Not Currently     Review of Systems     Unable to participate due to LOC and intubation     Objective:     Vitals:    Pulse: (!) 126  BP: 99/70  Resp: 18  SpO2: 99 %  Oxygen Concentration (%): 60  O2 Device (Oxygen Therapy): ventilator  Vent Mode: A/C  Set Rate: 18 BPM  Vt Set: 470 mL  PEEP/CPAP: 5 cmH20  Peak Airway Pressure: 39  cmH2O  Mean Airway Pressure: 12 cmH20  Plateau Pressure: 0 cmH20    Temp  Min: 99 °F (37.2 °C)  Max: 99.4 °F (37.4 °C)  Pulse  Min: 114  Max: 127  BP  Min: 99/70  Max: 109/75  Resp  Min: 18  Max: 24  SpO2  Min: 99 %  Max: 100 %  Oxygen Concentration (%)  Min: 60  Max: 60    No intake/output data recorded.           Physical Exam      Physical Exam:  GA: sedated , comfortable, no acute distress.   HEENT: No scleral icterus or JVD.   Pulmonary: Coarse bilaterally   Cardiac: RRR S1 & S2 w/o rubs/murmurs/gallops.   Abdominal: Bowel sounds present x 4.   Skin: Stage 2 L buttock DTI R .  Psych: Mental Status:  Sedated moves spontaneously uppers does not follow   Neuro:  --GCS: E2 V1t M5  --Pupils 3mm, PERRL.   --Corneal reflex, gag, cough intact.  --moves uppers spont lower withdraw  Unable to test orientation, language, memory, judgment, insight, fund of knowledge, hearing, shoulder shrug, tongue protrusion, coordination, gait due to level of consciousness.    Today I personally reviewed pertinent medications, lines/drains/airways, imaging, cardiology results, laboratory results, microbiology results,

## 2020-08-26 NOTE — PLAN OF CARE
POC reviewed with pt mother at 0500. Pt  mother verbalized understanding. Questions and concerns addressed. No acute events overnight. Pt progressing toward goals. Pt off levo on precedex, NS bolus 250ml x2 doses. Will continue to monitor. See flowsheets for full assessment and VS info

## 2020-08-26 NOTE — CONSULTS
Wound consult received on patient. Skin breakdown Present on Admission.  Patient with history of cerebral palsy, hip surgery, seizures,acid reflux and peg tube placement.  Patient contracted. Deep tissue pressure injuries noted to bilateral ischiums, boggy to palpation, full thickness skin loss noted to left ischium.  Shallow full thickness skin loss noted to patient's scrotum.  Satellite lesions noted to upper sacrum/buttocks, fungal in appearance.  Patient's mother at bedside reports recent weight loss and diarrhea for patient.  Dry crust overlying right wrist, patient's mother reports area to be prior IV site and was using Triple antibiotic cream at other facility.  Bilateral heels intact.    Recommendations:  Sizewise immerse surface.  Turn every 2hrs. Pad bony prominences with pillows or foam dressings.  Deep tissue pressure injuries to bilateral ischium and skin breakdown to scrotum: venelex ointment BID/prn  (continue to monitor deep tissue pressure injuries as they continue to demarcate, full thickness skin loss currently noted to left ischium, both ischial pressure injuries may demarcate to further full thickness skin loss.)  Upper sacrum/buttocks: BID/prn apply criticaid clear barrier cream with miconazole.  Right wrist skin breakdown: daily apply bacitracin ointment, cover with foam dressing.    Discussed with Dr. Sanchez with [facility].  MD approved recommendations.  Nursing to continue care. Wound care to follow prn.    satellite lesions noted to upper sacrum/buttocks fungal in appearance    Right ischium: dark purple discoloration with redden periwound, boggy to palpation 5cm x 5cm    Left ischium: dark purple discoloration with full thickness skin loss 90% dark purple discoloration, boggy to palpation, 10% yellow slough, redden periwound 5cm x 5cm x 0.3cm  Scrotum: 2cm x1cm x0.2cm shallow full thickness skin loss moist pink wound base, scant yellow slough noted    Right wrist- dry crust 1cm x 0.5cm x  0.1cm

## 2020-08-26 NOTE — H&P
[facility]  Neurocritical Care  History & Physical    Admit Date: 8/26/2020  Service Date: 08/26/2020  Length of Stay: 0    Subjective:     Chief Complaint: Encephalopathy acute    History of Present Illness: Glen Moscoso is a 25 yo M with a PMH of CP, Seizures, and recent admission for hyponatremia who presents to [facility] for encephalopathy, sepsis, diarrhea, and possible seizure. He presented to OSH after being found unresponsive and in  Resp distress. He was found down by his mother upon EMS arrival he had agonal resp 3-4 per min, he was intubated by EMS and take to OSH ED. He was hypotensive on arrival started on Levo gtt, received antibiotics, ABG showed metabolic acidosis. He is being admitted to [facility] for a higher level of care, EGG and close monitoring.     Past Medical History:   Diagnosis Date    Acid reflux     Cerebral palsy     History of hip surgery     S/P percutaneous endoscopic gastrostomy (PEG) tube placement     Seizures      History reviewed. No pertinent surgical history.   No current facility-administered medications on file prior to encounter.      Current Outpatient Medications on File Prior to Encounter   Medication Sig Dispense Refill    baclofen (LIORESAL) 10 MG tablet Take 10 mg by mouth 3 (three) times daily.      cholestyramine (QUESTRAN) 4 gram packet Take 1 packet (4 g total) by mouth 2 (two) times daily. 60 packet 0    diphenhydrAMINE (BENADRYL) 12.5 mg/5 mL elixir Take by mouth 4 (four) times daily as needed for Allergies.      gabapentin (NEURONTIN) 250 mg/5 mL solution Take by mouth nightly. At bedtime      ibuprofen (ADVIL,MOTRIN) 100 mg/5 mL suspension Take by mouth every 6 (six) hours as needed for Temperature greater than.      Lactobacillus rhamnosus GG (CULTURELLE) 10 billion cell capsule 1 capsule by Per G Tube route once daily.      nystatin (MYCOSTATIN) cream Apply topically 2 (two) times daily.      PHENobarbitaL 20 mg/5 mL (4 mg/mL) Elix elixir  Take 25 mLs (100 mg total) by mouth every evening. 750 mL 0    propranoloL (INDERAL) 20 MG tablet Take 1 tablet (20 mg total) by mouth 3 (three) times daily. 90 tablet 0      Allergies: Patient has no known allergies.    History reviewed. No pertinent family history.  Social History     Tobacco Use    Smoking status: Never Smoker    Smokeless tobacco: Never Used   Substance Use Topics    Alcohol use: Never     Frequency: Never    Drug use: Not Currently     Review of Systems     Unable to participate due to LOC and intubation     Objective:     Vitals:    Pulse: (!) 126  BP: 99/70  Resp: 18  SpO2: 99 %  Oxygen Concentration (%): 60  O2 Device (Oxygen Therapy): ventilator  Vent Mode: A/C  Set Rate: 18 BPM  Vt Set: 470 mL  PEEP/CPAP: 5 cmH20  Peak Airway Pressure: 39 cmH2O  Mean Airway Pressure: 12 cmH20  Plateau Pressure: 0 cmH20    Temp  Min: 99 °F (37.2 °C)  Max: 99.4 °F (37.4 °C)  Pulse  Min: 114  Max: 127  BP  Min: 99/70  Max: 109/75  Resp  Min: 18  Max: 24  SpO2  Min: 99 %  Max: 100 %  Oxygen Concentration (%)  Min: 60  Max: 60    No intake/output data recorded.           Physical Exam      Physical Exam:  GA: sedated , comfortable, no acute distress.   HEENT: No scleral icterus or JVD.   Pulmonary: Coarse bilaterally   Cardiac: RRR S1 & S2 w/o rubs/murmurs/gallops.   Abdominal: Bowel sounds present x 4.   Skin: Stage 2 L buttock DTI R .  Psych: Mental Status:  Sedated moves spontaneously uppers does not follow   Neuro:  --GCS: E2 V1t M5  --Pupils 3mm, PERRL.   --Corneal reflex, gag, cough intact.  --moves uppers spont lower withdraw  Unable to test orientation, language, memory, judgment, insight, fund of knowledge, hearing, shoulder shrug, tongue protrusion, coordination, gait due to level of consciousness.    Today I personally reviewed pertinent medications, lines/drains/airways, imaging, cardiology results, laboratory results, microbiology results,    Assessment/Plan:     Neuro  * Encephalopathy  acute  - at baseline he is confused but able to interact and unable to walk  - found unresponsive   - EEG pending   - Metabolic acidosis     Seizure  - EEG pending   - Phenobarb level   - continue home phenobarb dose     Cerebral palsy  - hx of   - Mother care giver     Derm  Decubitus ulcer of left buttock, stage 2  - consult wound care for DTI     Pulmonary  Respiratory distress  - Mechanical Vent   - intubated by EMS     Oncology  Leukocytosis  - WBC 25 OSH   - repeat pending   - BC x 2, UA, Sputum   - Vanc and zosyn     Orthopedic  Deep tissue injury  - R buttock   - Wound care consult     Other  Endotracheally intubated  - Vap prophylaxis   - Daily abg   - Daily cxr            The patient is being Prophylaxed for:  Venous Thromboembolism with: Mechanical  Stress Ulcer with: H2B  Ventilator Pneumonia with: chlorhexidine oral care    Activity Orders          Diet NPO: NPO starting at 08/26 0159        Full Code     CCT 40 min     Omar Perez NP  Neurocritical Care  [facility]

## 2020-08-27 PROBLEM — R06.03 RESPIRATORY DISTRESS: Status: RESOLVED | Noted: 2020-08-26 | Resolved: 2020-08-27

## 2020-08-27 PROBLEM — Z97.8 ENDOTRACHEALLY INTUBATED: Status: RESOLVED | Noted: 2020-08-26 | Resolved: 2020-08-27

## 2020-08-27 PROBLEM — E44.0 MODERATE MALNUTRITION: Status: ACTIVE | Noted: 2020-08-27

## 2020-08-27 LAB
ALBUMIN SERPL BCP-MCNC: 1.7 G/DL (ref 3.5–5.2)
ALLENS TEST: ABNORMAL
ALP SERPL-CCNC: 105 U/L (ref 55–135)
ALT SERPL W/O P-5'-P-CCNC: 21 U/L (ref 10–44)
ANION GAP SERPL CALC-SCNC: 9 MMOL/L (ref 8–16)
ANISOCYTOSIS BLD QL SMEAR: SLIGHT
AST SERPL-CCNC: 30 U/L (ref 10–40)
BACTERIA UR CULT: NORMAL
BASO STIPL BLD QL SMEAR: ABNORMAL
BASOPHILS # BLD AUTO: 0.05 K/UL (ref 0–0.2)
BASOPHILS NFR BLD: 0.2 % (ref 0–1.9)
BILIRUB SERPL-MCNC: 0.4 MG/DL (ref 0.1–1)
BUN SERPL-MCNC: 37 MG/DL (ref 6–20)
BURR CELLS BLD QL SMEAR: ABNORMAL
C DIFF GDH STL QL: NEGATIVE
C DIFF TOX A+B STL QL IA: NEGATIVE
CALCIUM SERPL-MCNC: 7.4 MG/DL (ref 8.7–10.5)
CHLORIDE SERPL-SCNC: 121 MMOL/L (ref 95–110)
CO2 SERPL-SCNC: 13 MMOL/L (ref 23–29)
CREAT SERPL-MCNC: 1.1 MG/DL (ref 0.5–1.4)
DELSYS: ABNORMAL
DIFFERENTIAL METHOD: ABNORMAL
DOHLE BOD BLD QL SMEAR: PRESENT
EOSINOPHIL # BLD AUTO: 0 K/UL (ref 0–0.5)
EOSINOPHIL NFR BLD: 0.2 % (ref 0–8)
ERYTHROCYTE [DISTWIDTH] IN BLOOD BY AUTOMATED COUNT: 16.7 % (ref 11.5–14.5)
EST. GFR  (AFRICAN AMERICAN): >60 ML/MIN/1.73 M^2
EST. GFR  (NON AFRICAN AMERICAN): >60 ML/MIN/1.73 M^2
FIO2: 40
GLUCOSE SERPL-MCNC: 92 MG/DL (ref 70–110)
HCO3 UR-SCNC: 14.1 MMOL/L (ref 24–28)
HCT VFR BLD AUTO: 27.9 % (ref 40–54)
HGB BLD-MCNC: 8.9 G/DL (ref 14–18)
IMM GRANULOCYTES # BLD AUTO: 0.29 K/UL (ref 0–0.04)
IMM GRANULOCYTES NFR BLD AUTO: 1.4 % (ref 0–0.5)
LYMPHOCYTES # BLD AUTO: 1.7 K/UL (ref 1–4.8)
LYMPHOCYTES NFR BLD: 8.4 % (ref 18–48)
MAGNESIUM SERPL-MCNC: 2.3 MG/DL (ref 1.6–2.6)
MCH RBC QN AUTO: 29.2 PG (ref 27–31)
MCHC RBC AUTO-ENTMCNC: 31.9 G/DL (ref 32–36)
MCV RBC AUTO: 92 FL (ref 82–98)
MODE: ABNORMAL
MONOCYTES # BLD AUTO: 2.2 K/UL (ref 0.3–1)
MONOCYTES NFR BLD: 10.5 % (ref 4–15)
NEUTROPHILS # BLD AUTO: 16.4 K/UL (ref 1.8–7.7)
NEUTROPHILS NFR BLD: 79.3 % (ref 38–73)
NRBC BLD-RTO: 0 /100 WBC
PCO2 BLDA: 30.7 MMHG (ref 35–45)
PEEP: 5
PH SMN: 7.27 [PH] (ref 7.35–7.45)
PHOSPHATE SERPL-MCNC: 2.4 MG/DL (ref 2.7–4.5)
PLATELET # BLD AUTO: 425 K/UL (ref 150–350)
PLATELET BLD QL SMEAR: ABNORMAL
PMV BLD AUTO: 10.2 FL (ref 9.2–12.9)
PO2 BLDA: 27 MMHG (ref 40–60)
POC BE: -13 MMOL/L
POC SATURATED O2: 44 % (ref 95–100)
POC TCO2: 15 MMOL/L (ref 24–29)
POCT GLUCOSE: 77 MG/DL (ref 70–110)
POCT GLUCOSE: 80 MG/DL (ref 70–110)
POCT GLUCOSE: 94 MG/DL (ref 70–110)
POIKILOCYTOSIS BLD QL SMEAR: SLIGHT
POTASSIUM SERPL-SCNC: 2.7 MMOL/L (ref 3.5–5.1)
POTASSIUM SERPL-SCNC: 3.4 MMOL/L (ref 3.5–5.1)
PROCALCITONIN SERPL IA-MCNC: 4.37 NG/ML
PROT SERPL-MCNC: 5.7 G/DL (ref 6–8.4)
PS: 40
RBC # BLD AUTO: 3.05 M/UL (ref 4.6–6.2)
SAMPLE: ABNORMAL
SITE: ABNORMAL
SODIUM SERPL-SCNC: 143 MMOL/L (ref 136–145)
SPONT RATE: 15
TOXIC GRANULES BLD QL SMEAR: PRESENT
VANCOMYCIN SERPL-MCNC: 17.2 UG/ML
WBC # BLD AUTO: 20.71 K/UL (ref 3.9–12.7)

## 2020-08-27 PROCEDURE — 80202 ASSAY OF VANCOMYCIN: CPT

## 2020-08-27 PROCEDURE — 94010 BREATHING CAPACITY TEST: CPT

## 2020-08-27 PROCEDURE — 84100 ASSAY OF PHOSPHORUS: CPT

## 2020-08-27 PROCEDURE — 27200966 HC CLOSED SUCTION SYSTEM

## 2020-08-27 PROCEDURE — 94150 VITAL CAPACITY TEST: CPT

## 2020-08-27 PROCEDURE — 27000221 HC OXYGEN, UP TO 24 HOURS

## 2020-08-27 PROCEDURE — 99900035 HC TECH TIME PER 15 MIN (STAT)

## 2020-08-27 PROCEDURE — 63600175 PHARM REV CODE 636 W HCPCS: Performed by: NURSE PRACTITIONER

## 2020-08-27 PROCEDURE — 83735 ASSAY OF MAGNESIUM: CPT

## 2020-08-27 PROCEDURE — 84145 PROCALCITONIN (PCT): CPT

## 2020-08-27 PROCEDURE — 25000003 PHARM REV CODE 250: Performed by: PSYCHIATRY & NEUROLOGY

## 2020-08-27 PROCEDURE — 85025 COMPLETE CBC W/AUTO DIFF WBC: CPT

## 2020-08-27 PROCEDURE — 87324 CLOSTRIDIUM AG IA: CPT

## 2020-08-27 PROCEDURE — 84132 ASSAY OF SERUM POTASSIUM: CPT

## 2020-08-27 PROCEDURE — 99291 PR CRITICAL CARE, E/M 30-74 MINUTES: ICD-10-PCS | Mod: ,,, | Performed by: PSYCHIATRY & NEUROLOGY

## 2020-08-27 PROCEDURE — 94761 N-INVAS EAR/PLS OXIMETRY MLT: CPT

## 2020-08-27 PROCEDURE — 63600175 PHARM REV CODE 636 W HCPCS: Performed by: PHYSICIAN ASSISTANT

## 2020-08-27 PROCEDURE — 97802 MEDICAL NUTRITION INDIV IN: CPT

## 2020-08-27 PROCEDURE — 25000003 PHARM REV CODE 250: Performed by: NURSE PRACTITIONER

## 2020-08-27 PROCEDURE — 82803 BLOOD GASES ANY COMBINATION: CPT

## 2020-08-27 PROCEDURE — 87449 NOS EACH ORGANISM AG IA: CPT

## 2020-08-27 PROCEDURE — 20000000 HC ICU ROOM

## 2020-08-27 PROCEDURE — 80053 COMPREHEN METABOLIC PANEL: CPT

## 2020-08-27 PROCEDURE — 94003 VENT MGMT INPAT SUBQ DAY: CPT

## 2020-08-27 PROCEDURE — 63600175 PHARM REV CODE 636 W HCPCS: Performed by: STUDENT IN AN ORGANIZED HEALTH CARE EDUCATION/TRAINING PROGRAM

## 2020-08-27 PROCEDURE — 99291 CRITICAL CARE FIRST HOUR: CPT | Mod: ,,, | Performed by: PSYCHIATRY & NEUROLOGY

## 2020-08-27 PROCEDURE — 63600175 PHARM REV CODE 636 W HCPCS: Performed by: PSYCHIATRY & NEUROLOGY

## 2020-08-27 PROCEDURE — 25000003 PHARM REV CODE 250: Performed by: PHYSICIAN ASSISTANT

## 2020-08-27 PROCEDURE — 36415 COLL VENOUS BLD VENIPUNCTURE: CPT

## 2020-08-27 PROCEDURE — 99900026 HC AIRWAY MAINTENANCE (STAT)

## 2020-08-27 RX ORDER — POTASSIUM CHLORIDE 7.45 MG/ML
10 INJECTION INTRAVENOUS
Status: COMPLETED | OUTPATIENT
Start: 2020-08-27 | End: 2020-08-27

## 2020-08-27 RX ORDER — POTASSIUM CHLORIDE 7.45 MG/ML
10 INJECTION INTRAVENOUS ONCE
Status: COMPLETED | OUTPATIENT
Start: 2020-08-27 | End: 2020-08-27

## 2020-08-27 RX ORDER — HEPARIN SODIUM 5000 [USP'U]/ML
5000 INJECTION, SOLUTION INTRAVENOUS; SUBCUTANEOUS EVERY 12 HOURS
Status: DISCONTINUED | OUTPATIENT
Start: 2020-08-27 | End: 2020-09-08

## 2020-08-27 RX ADMIN — PIPERACILLIN SODIUM AND TAZOBACTAM SODIUM 4.5 G: 4; .5 INJECTION, POWDER, LYOPHILIZED, FOR SOLUTION INTRAVENOUS at 10:08

## 2020-08-27 RX ADMIN — CASTOR OIL AND BALSAM, PERU: 788; 87 OINTMENT TOPICAL at 08:08

## 2020-08-27 RX ADMIN — POTASSIUM CHLORIDE 10 MEQ: 7.46 INJECTION, SOLUTION INTRAVENOUS at 04:08

## 2020-08-27 RX ADMIN — VANCOMYCIN HYDROCHLORIDE 500 MG: 500 INJECTION, POWDER, LYOPHILIZED, FOR SOLUTION INTRAVENOUS at 04:08

## 2020-08-27 RX ADMIN — FAMOTIDINE 20 MG: 20 TABLET, FILM COATED ORAL at 08:08

## 2020-08-27 RX ADMIN — Medication 1 CAPSULE: at 10:08

## 2020-08-27 RX ADMIN — BACLOFEN 10 MG: 10 TABLET ORAL at 08:08

## 2020-08-27 RX ADMIN — PHENOBARBITAL 100 MG: 20 ELIXIR ORAL at 08:08

## 2020-08-27 RX ADMIN — HEPARIN SODIUM 5000 UNITS: 5000 INJECTION INTRAVENOUS; SUBCUTANEOUS at 10:08

## 2020-08-27 RX ADMIN — MICONAZOLE NITRATE: 20 OINTMENT TOPICAL at 08:08

## 2020-08-27 RX ADMIN — PIPERACILLIN SODIUM AND TAZOBACTAM SODIUM 4.5 G: 4; .5 INJECTION, POWDER, LYOPHILIZED, FOR SOLUTION INTRAVENOUS at 08:08

## 2020-08-27 RX ADMIN — BACLOFEN 10 MG: 10 TABLET ORAL at 02:08

## 2020-08-27 RX ADMIN — POTASSIUM CHLORIDE 10 MEQ: 7.46 INJECTION, SOLUTION INTRAVENOUS at 07:08

## 2020-08-27 RX ADMIN — POTASSIUM CHLORIDE 10 MEQ: 7.46 INJECTION, SOLUTION INTRAVENOUS at 10:08

## 2020-08-27 RX ADMIN — HEPARIN SODIUM 5000 UNITS: 5000 INJECTION INTRAVENOUS; SUBCUTANEOUS at 08:08

## 2020-08-27 RX ADMIN — POTASSIUM CHLORIDE 10 MEQ: 7.46 INJECTION, SOLUTION INTRAVENOUS at 11:08

## 2020-08-27 RX ADMIN — PROPRANOLOL HYDROCHLORIDE 20 MG: 10 TABLET ORAL at 08:08

## 2020-08-27 RX ADMIN — PROPRANOLOL HYDROCHLORIDE 20 MG: 10 TABLET ORAL at 02:08

## 2020-08-27 RX ADMIN — PIPERACILLIN SODIUM AND TAZOBACTAM SODIUM 4.5 G: 4; .5 INJECTION, POWDER, LYOPHILIZED, FOR SOLUTION INTRAVENOUS at 02:08

## 2020-08-27 RX ADMIN — BACITRACIN: 500 OINTMENT TOPICAL at 08:08

## 2020-08-27 NOTE — PLAN OF CARE
POC reviewed with pt mother at 0500. Pt's  mother verbalized understanding. Questions and concerns addressed. No acute events overnight. Removed IO cath from left lower leg, placed new IV right upper arm. Pt afebrile overnight. K 2.7 Notified [facility] ([place]). Loose liquid stool over night x2. Will continue to monitor. See flowsheets for full assessment and VS info

## 2020-08-27 NOTE — PROGRESS NOTES
[facility]  Neurocritical Care  Progress Note    Admit Date: 8/26/2020  Service Date: 08/27/2020  Length of Stay: 1    Subjective:     Chief Complaint: Encephalopathy acute    History of Present Illness: Glen Moscoso is a 25 yo M with a PMH of CP, Seizures, and recent admission for hyponatremia who presents to [facility] for encephalopathy, sepsis, diarrhea, and possible seizure. He presented to OSH after being found unresponsive and in  Resp distress. He was found down by his mother upon EMS arrival he had agonal resp 3-4 per min, he was intubated by EMS and take to OSH ED. He was hypotensive on arrival started on Levo gtt, received antibiotics, ABG showed metabolic acidosis. He is being admitted to [facility] for a higher level of care, EGG and close monitoring.     Hospital Course: 8/26: Admit NCC: BC, antibiotics, EEG, Levo  08/27/2020 Extubated on 8/27/20. Tube feeds were started. Awaiting Urine culture. Patient had multiple episodes of diarrhea and had an extensive antibiotic history before admission, so will collect C diff. EEG does not show any epileptiform activity or discharge.     Interval History:  Extubated on 8/27/20. Tube feeds were started. Awaiting Urine culture. Patient had multiple episodes of diarrhea and had an extensive antibiotic history before admission, so will collect C diff. EEG does not show any epileptiform activity or discharge.     Review of Systems   Constitutional: Negative for chills, fatigue and fever.   Psychiatric/Behavioral: Positive for behavioral problems and decreased concentration. Negative for agitation.     Objective:     Vitals:  Temp: 98.9 °F (37.2 °C)  Pulse: 88  Rhythm: normal sinus rhythm  BP: 114/75  MAP (mmHg): 89  Resp: 18  SpO2: 100 %  Oxygen Concentration (%): 40  O2 Device (Oxygen Therapy): room air  Vent Mode: Spont  Pressure Support: 5 cmH20  PEEP/CPAP: 5 cmH20  Peak Airway Pressure: 10 cmH2O  Mean Airway Pressure: 9.4 cmH20  Plateau Pressure: 0  cmH20    Temp  Min: 98.7 °F (37.1 °C)  Max: 100.3 °F (37.9 °C)  Pulse  Min: 84  Max: 112  BP  Min: 92/54  Max: 138/64  MAP (mmHg)  Min: 69  Max: 99  Resp  Min: 13  Max: 31  SpO2  Min: 99 %  Max: 100 %  Oxygen Concentration (%)  Min: 40  Max: 40    08/26 0701 - 08/27 0700  In: 2761.1 [I.V.:1351.1]  Out: 608 [Urine:608]   Unmeasured Output  Stool Occurrence: 1  Pad Count: 2       Physical Exam  Unable to test orientation, language, memory, judgment, insight, fund of knowledge, hearing, shoulder shrug, tongue protrusion, coordination, gait due to level of consciousness.    At baseline patient is wheelchair bound and bed bound   All four extremities are contracted however will move spontaneously   Does not follow commands possibly due to his CP  Will turn head to voice     Medications:  Continuoussodium chloride 0.9%, Last Rate: 50 mL/hr at 08/27/20 1505  dexmedetomidine (PRECEDEX) infusion, Last Rate: Stopped (08/27/20 1305)    Scheduledbacitracin, , Daily  baclofen, 10 mg, TID  balsam peru-castor oiL, , BID  famotidine, 20 mg, Daily  heparin (porcine), 5,000 Units, Q12H  Lactobacillus rhamnosus GG, 1 capsule, Daily  miconazole nitrate 2%, , BID  PHENobarbitaL, 100 mg, QHS  piperacillin-tazobactam (ZOSYN) IVPB, 4.5 g, Q8H  potassium chloride in water, 10 mEq, Once  propranoloL, 20 mg, TID  vancomycin (VANCOCIN) IVPB, 500 mg, Q24H    PRNacetaminophen, 650 mg, Q4H PRN  ondansetron, 4 mg, Q8H PRN  sodium chloride 0.9%, 10 mL, PRN  vancomycin - pharmacy to dose, , pharmacy to manage frequency      Today I personally reviewed pertinent medications, lines/drains/airways, imaging, cardiology results, laboratory results, microbiology results, notably:    Diet  Diet NPO  Diet NPO        Assessment/Plan:     Neuro  * Encephalopathy acute  - at baseline he is confused but able to interact and unable to walk  - found unresponsive    - EEG does not show any epileptiform activity   - Encephalopathy most likely due to a combination  of dehydration and possible infection (workup pending)     Seizure  - EEG done at hospital does not show any electrographic seizures     Cerebral palsy  - is wheelchair bound and bed bound   - Mother care giver     Derm  Decubitus ulcer of left buttock, stage 2  - consult wound care for DTI     Oncology  Leukocytosis  - WBC 25 OSH   - Trending downwards    - BC x 2, UA, Sputum   -Sent C diff due to multiple episodes of diarrhea   - Vanc and zosyn       Orthopedic  Deep tissue injury  - R buttock   - Wound care consult     Other  Endotracheally intubated-resolved as of 8/27/2020  - Vap prophylaxis   - Daily abg   - Daily cxr            The patient is being Prophylaxed for:  Venous Thromboembolism with: Chemical  Stress Ulcer with: None  Ventilator Pneumonia with: not applicable    Activity Orders          Diet NPO: NPO starting at 08/26 0159        Full Code    Kaley Barcenas MD  Neurocritical Care  [facility]

## 2020-08-27 NOTE — ASSESSMENT & PLAN NOTE
Nutrition Problem:  Moderate Protein-Calorie Malnutrition  Malnutrition in the context of Acute Illness/Injury    Related to (etiology):  Increased nutrient needs    Signs and Symptoms (as evidenced by):  Energy Intake: <75% of estimated energy requirement for >2 weeks  Body Fat Depletion: moderate depletion of orbitals and triceps   Muscle Mass Depletion: moderate and severe depletion of temples, clavicle region, interosseous muscle and lower extremities   Weight Loss: 14% x >2 weeks     Interventions (treatment strategy):  Collaboration of care with other providers  Enteral nutrition    Nutrition Diagnosis Status:  Continues

## 2020-08-27 NOTE — PLAN OF CARE
Problem: Malnutrition  Goal: Improved Nutritional Intake  Outcome: Ongoing, Progressing   Recommendations    Recommendation:   1. Suggest TF of Peptamen 1.5 with prebio @ 20 mL/hr increase to goal rate of 40 mL/hr to provide pt with 1440 kcal, 65 g protein and 739 mL free water.    -Additional water per MD. Hold for residuals >500 mL.   2. Continue lactobacillus rhamnosus 2/2 diarrhea   3. Add Vitamin C, zinc and MVI to aid in wound healing   RD to monitor and follow up    Goals: pt to receive nutrition by RD follow up  Nutrition Goal Status: new  Communication of RD Recs: discussed on rounds

## 2020-08-27 NOTE — ASSESSMENT & PLAN NOTE
- WBC 25 OSH   - Trending downwards    - BC x 2, UA, Sputum   -Sent C diff due to multiple episodes of diarrhea   - Vanc and zosyn

## 2020-08-27 NOTE — SUBJECTIVE & OBJECTIVE
Interval History:  Extubated on 8/27/20. Tube feeds were started. Awaiting Urine culture. Patient had multiple episodes of diarrhea and had an extensive antibiotic history before admission, so will collect C diff. EEG does not show any epileptiform activity or discharge.     Review of Systems   Constitutional: Negative for chills, fatigue and fever.   Psychiatric/Behavioral: Positive for behavioral problems and decreased concentration. Negative for agitation.     Objective:     Vitals:  Temp: 98.9 °F (37.2 °C)  Pulse: 88  Rhythm: normal sinus rhythm  BP: 114/75  MAP (mmHg): 89  Resp: 18  SpO2: 100 %  Oxygen Concentration (%): 40  O2 Device (Oxygen Therapy): room air  Vent Mode: Spont  Pressure Support: 5 cmH20  PEEP/CPAP: 5 cmH20  Peak Airway Pressure: 10 cmH2O  Mean Airway Pressure: 9.4 cmH20  Plateau Pressure: 0 cmH20    Temp  Min: 98.7 °F (37.1 °C)  Max: 100.3 °F (37.9 °C)  Pulse  Min: 84  Max: 112  BP  Min: 92/54  Max: 138/64  MAP (mmHg)  Min: 69  Max: 99  Resp  Min: 13  Max: 31  SpO2  Min: 99 %  Max: 100 %  Oxygen Concentration (%)  Min: 40  Max: 40    08/26 0701 - 08/27 0700  In: 2761.1 [I.V.:1351.1]  Out: 608 [Urine:608]   Unmeasured Output  Stool Occurrence: 1  Pad Count: 2       Physical Exam  Unable to test orientation, language, memory, judgment, insight, fund of knowledge, hearing, shoulder shrug, tongue protrusion, coordination, gait due to level of consciousness.    At baseline patient is wheelchair bound and bed bound   All four extremities are contracted however will move spontaneously   Does not follow commands possibly due to his CP  Will turn head to voice     Medications:  Continuoussodium chloride 0.9%, Last Rate: 50 mL/hr at 08/27/20 1505  dexmedetomidine (PRECEDEX) infusion, Last Rate: Stopped (08/27/20 1305)    Scheduledbacitracin, , Daily  baclofen, 10 mg, TID  balsam peru-castor oiL, , BID  famotidine, 20 mg, Daily  heparin (porcine), 5,000 Units, Q12H  Lactobacillus rhamnosus GG, 1  capsule, Daily  miconazole nitrate 2%, , BID  PHENobarbitaL, 100 mg, QHS  piperacillin-tazobactam (ZOSYN) IVPB, 4.5 g, Q8H  potassium chloride in water, 10 mEq, Once  propranoloL, 20 mg, TID  vancomycin (VANCOCIN) IVPB, 500 mg, Q24H    PRNacetaminophen, 650 mg, Q4H PRN  ondansetron, 4 mg, Q8H PRN  sodium chloride 0.9%, 10 mL, PRN  vancomycin - pharmacy to dose, , pharmacy to manage frequency      Today I personally reviewed pertinent medications, lines/drains/airways, imaging, cardiology results, laboratory results, microbiology results, notably:    Diet  Diet NPO  Diet NPO

## 2020-08-27 NOTE — CONSULTS
"  [facility]  Adult Nutrition  Consult Note    SUMMARY     Recommendations    Recommendation:   1. Suggest TF of Peptamen 1.5 with prebio @ 20 mL/hr increase to goal rate of 40 mL/hr to provide pt with 1440 kcal, 65 g protein and 739 mL free water.    -Additional water per MD. Hold for residuals >500 mL.   2. Continue lactobacillus rhamnosus 2/2 diarrhea   3. Add Vitamin C, zinc and MVI to aid in wound healing   RD to monitor and follow up    Goals: pt to receive nutrition by RD follow up  Nutrition Goal Status: new  Communication of RD Recs: discussed on rounds    Reason for Assessment    Reason For Assessment: consult  Diagnosis: (Encephalopathy acute)  Relevant Medical History: Cerebral palsy; Seizures; PEG; Acid reflux  Interdisciplinary Rounds: attended  General Information Comments: Pt intubated on vent at this time. Per family in room, pt with PEG placed and was on Ensure 4 cans/day PTA. Pt with diarrhea x >2 weeks and significant wt loss per family. UBW ~85 lbs per family wt loss began with diarrhea and antibotics. Wounds noted. NFPE completed today, pt meets criteria for moderate malnutrition at this time, with some muscle loss related to wt loss and cerebral palsy related.  Nutrition Discharge Planning: adequate intake via TF    Nutrition Risk Screen    Nutrition Risk Screen: tube feeding or parenteral nutrition, large or nonhealing wound, burn or pressure injury    Nutrition/Diet History    Food Allergies: NKFA  Factors Affecting Nutritional Intake: NPO, on mechanical ventilation  Nutrition Support Formula Prior to Admit: Other (see comments)(Ensure 4 cans/day)    Anthropometrics    Temp: 98.9 °F (37.2 °C)  Height Method: Stated  Height: 4' 9" (144.8 cm)  Height (inches): 57 in  Weight Method: Bed Scale  Weight: 33.1 kg (73 lb)  Weight (lb): 73 lb  Ideal Body Weight (IBW), Male: 88 lb  % Ideal Body Weight, Male (lb): 82.95 %  BMI (Calculated): 15.8  BMI Grade: less than 16 " protein-energy malnutrition grade III  Usual Body Weight (UBW), k.64 kg  % Usual Body Weight: 85.87       Lab/Procedures/Meds    Pertinent Labs Reviewed: reviewed  Pertinent Labs Comments: K 2.7; BUN 37; Ca 7.4; Phos 2.4  Pertinent Medications Reviewed: reviewed  Pertinent Medications Comments: famotidine; heparin; lactobacillus rhamnose; vancomycin; propranolol     Estimated/Assessed Needs    Weight Used For Calorie Calculations: 33.1 kg (72 lb 15.6 oz)  Energy Calorie Requirements (kcal): 1493 kcal/d  Energy Need Method: [facility]  Protein Requirements: 53-66 g/day  Weight Used For Protein Calculations: 33.1 kg (72 lb 15.6 oz)  Fluid Requirements (mL): 1 mL/kcal or per MD  RDA Method (mL): 1493    Nutrition Prescription Ordered    Current Diet Order: NPO    Evaluation of Received Nutrient/Fluid Intake    I/O: +2.7 L since admit  Energy Calories Required: not meeting needs  Protein Required: not meeting needs  Fluid Required: other (see comments)  Comments: LBM 8/26  % Intake of Estimated Energy Needs: 0 - 25 %  % Meal Intake: NPO    Nutrition Risk    Level of Risk/Frequency of Follow-up: high     Assessment and Plan  Nutrition Problem:  Moderate Protein-Calorie Malnutrition  Malnutrition in the context of Acute Illness/Injury    Related to (etiology):  Increased nutrient needs    Signs and Symptoms (as evidenced by):  Energy Intake: <75% of estimated energy requirement for >2 weeks  Body Fat Depletion: moderate depletion of orbitals and triceps   Muscle Mass Depletion: moderate and severe depletion of temples, clavicle region, interosseous muscle and lower extremities   Weight Loss: 14% x >2 weeks     Interventions(treatment strategy):  Collaboration of care with other providers  Enteral nutrition    Nutrition Diagnosis Status:  New    Monitor and Evaluation    Food and Nutrient Intake: energy intake, enteral nutrition intake  Food and Nutrient Adminstration: enteral and parenteral nutrition  administration  Anthropometric Measurements: weight, weight change  Biochemical Data, Medical Tests and Procedures: electrolyte and renal panel, gastrointestinal profile, glucose/endocrine profile, inflammatory profile, lipid profile  Nutrition-Focused Physical Findings: overall appearance     Malnutrition Assessment  Malnutrition Type: acute illness or injury  Weight Loss (Malnutrition): (14 % x >2 weeks)  Subcutaneous Fat (Malnutrition): moderate depletion  Muscle Mass (Malnutrition): moderate depletion   Orbital Region (Subcutaneous Fat Loss): moderate depletion  Upper Arm Region (Subcutaneous Fat Loss): moderate depletion   Baptist Region (Muscle Loss): severe depletion  Clavicle Bone Region (Muscle Loss): severe depletion  Clavicle and Acromion Bone Region (Muscle Loss): severe depletion  Dorsal Hand (Muscle Loss): moderate depletion  Patellar Region (Muscle Loss): moderate depletion  Anterior Thigh Region (Muscle Loss): severe depletion  Posterior Calf Region (Muscle Loss): severe depletion   Edema (Fluid Accumulation): 0-->no edema present     Nutrition Follow-Up    RD Follow-up?: Yes

## 2020-08-27 NOTE — ASSESSMENT & PLAN NOTE
- at baseline he is confused but able to interact and unable to walk  - found unresponsive    - EEG does not show any epileptiform activity   - Encephalopathy most likely due to a combination of dehydration and possible infection (workup pending)

## 2020-08-27 NOTE — PROCEDURES
DATE: 8/26/20    EEG NUMBER: FH     REFERRING PHYSICIAN:  Dr. Sanchez      This EEG was performed to assess for subclinical seizures      ELECTROENCEPHALOGRAM REPORT     METHODOLOGY:  Electroencephalographic (EEG) is recorded with electrodes placed according to the International 10-20 placement system.  Thirty two (32) channels of digital signal (sampling rate of 512/sec), including T1 and T2, were simultaneously recorded from the scalp and may include EKG, EMG, and/or eye monitors.  Recording band pass was 0.1 to 512 Hz.  Digital video recording of the patient is simultaneously recorded with the EEG.  The patient is instructed to report clinical symptoms which may occur during the recording session.  EEG and video recording are stored and archived in digital format.  Activation procedures, which include photic stimulation, hyperventilation and instructing patients to perform simple tasks, are done in selected patients.     The EEG is displayed on a monitor screen and can be reviewed using different montages.  Computer-assisted analysis is employed to detect spike and electrographic seizure activity.  The entire record is submitted for computer analysis.  The entire recording is visually reviewed, and the times identified by computer analysis as being spikes or seizures are reviewed again.     Compressed spectral analysis (CSA) is also performed on the activity recorded from each individual channel.  This is displayed as a power display of frequencies from 0 to 30 Hz over time.  The CSA is reviewed looking for asymmetries in power between homologous areas of the scalp, then compared with the original EEG recording.     Blek[employer] software was also utilized in the review of this study.  This software suite analyzes the EEG recording in multiple domains.  Coherence and rhythmicity are computed to identify EEG sections which may contain organized seizures.  Each channel undergoes analysis to detect the presence of  spike and sharp waves which have special and morphological characteristics of epileptic activity.  The routine EEG recording is converted from special into frequency domain.  This is then displayed comparing homologous areas to identify areas of significant asymmetry.  Algorithm to identify non-cortically generated artifact is used to separate artifact from the EEG.     Recording times  Start on August 26, 2020 hr 40 min 56 sec 40  End on August 26, 2020 hr 16 min 57 sec 59  The total time of EEG recording for the study was 1 hr 53 min    EEG FINDINGS:  The recording was obtained with a number of standard bipolar and referential montages during lethargic state.  The state the background is diffusely disorganized and comprised an admixture of theta range frequencies with a maximum frequency of 6 hertz which was symmetric.  The background comprised of an admixture of delta range frequencies which are often synchronous and semi rhythmical.  No clear state changes were appreciated.  During this stay patient had repetitive head movements as well as repetitive mouth movements.  None of these were associated epileptiform abnormality on EEG.  No seizures were recorded during this study.    The EKG channel revealed a sinus rhythm.     IMPRESSION:  This is an abnormal EEG during lethargic state.  Diffuse disorganized slowing of the background was noted.     CLINICAL CORRELATION:  The patient is a 24-year-old male with a history of cerebral palsy and seizures was currently maintained on phenobarbital.  This is an abnormal EEG during lethargic state.  Diffuse disorganized slowing of the background was noted suggestive of moderate to severe encephalopathy nonspecific to the cause.  There is no evidence of an epileptic process on this recording.  No seizures recorded in this study.  Episodes of mouth and head jerking were not associated epileptiform abnormality on EEG.

## 2020-08-27 NOTE — PROGRESS NOTES
Pharmacokinetic Assessment Follow Up: IV Vancomycin    Assessment and Plan:    - Vanc random level (~19 hours post-dose) is 17.2 mcg/mL  - Renal function improving since admission   - Schedule vanc 500 mg Q24H  - Draw trough prior to third dose on 8/29 at 16:00    Drug levels (last 3 results):  Recent Labs   Lab Result Units 08/26/20  1504 08/26/20  1606 08/27/20  1419   Vancomycin, Random ug/mL SEE COMMENT 19.5 17.2     Pharmacy will continue to follow and monitor vancomycin.    Please contact pharmacy at extension [phone] for questions regarding this assessment.    Thank you for the consult,   Joan Fu, PharmD, [facility]  Neurocritical Care Pharmacist  [phone]               Patient brief summary:  Glen Moscoso is a 24 y.o. male initiated on antimicrobial therapy with IV vancomycin for treatment of sepsis    Drug Allergies:   Review of patient's allergies indicates:  No Known Allergies    Actual Body Weight:   33.1 kg     Renal Function:   Estimated Creatinine Clearance: 49.3 mL/min (based on SCr of 1.1 mg/dL).    Dialysis Method (if applicable):  N/A    CBC (last 72 hours):  Recent Labs   Lab Result Units 08/26/20  0303 08/26/20  1149 08/27/20  0209   WBC K/uL 22.40*  22.40*  --  20.71*   Hemoglobin g/dL 11.7*  11.7*  --  8.9*   Hemoglobin A1C %  --  5.5  --    Hematocrit % 35.1*  35.1*  --  27.9*   Platelets K/uL 531*  531*  --  425*   Gran% % 77.1*  77.1*  --  79.3*   Lymph% % 7.7*  7.7*  --  8.4*   Mono% % 11.3  11.3  --  10.5   Eosinophil% % 0.7  0.7  --  0.2   Basophil% % 0.7  0.7  --  0.2   Differential Method  Automated  Automated  --  Automated       Metabolic Panel (last 72 hours):  Recent Labs   Lab Result Units 08/26/20  0532 08/26/20  0915 08/26/20  1606 08/26/20  1738 08/26/20  2250 08/27/20  0450 08/27/20  1419   Sodium mmol/L 133*  --  138 137  --  143  --    Potassium mmol/L 3.7  --  2.8* 2.5* 3.0* 2.7* 3.4*   Chloride mmol/L 109  --  117* 116*  --  121*  --    CO2 mmol/L 13*  --  14* 13*   --  13*  --    Glucose mg/dL 147*  --  101 103  --  92  --    Glucose, UA   --  Negative  --   --   --   --   --    BUN, Bld mg/dL 47*  --  46* 46*  --  37*  --    Creatinine mg/dL 1.8*  --  1.4 1.3  --  1.1  --    Albumin g/dL 1.9*  --   --   --   --  1.7*  --    Total Bilirubin mg/dL 0.4  --   --   --   --  0.4  --    Alkaline Phosphatase U/L 101  --   --   --   --  105  --    AST U/L 27  --   --   --   --  30  --    ALT U/L 22  --   --   --   --  21  --    Magnesium mg/dL 2.5  --   --   --   --  2.3  --    Phosphorus mg/dL 5.4*  --   --   --   --  2.4*  --        Vancomycin Administrations:  vancomycin given in the last 96 hours                   vancomycin (VANCOCIN) 500 mg in dextrose 5 % 100 mL IVPB (mg) 500 mg New Bag 08/26/20 0359                Microbiologic Results:  Microbiology Results (last 7 days)     Procedure Component Value Units Date/Time    Culture, Respiratory with Gram Stain [[id number]]  (Abnormal) Collected: 08/26/20 0505    Order Status: Completed Specimen: Respiratory from Bronchial Wash Updated: 08/27/20 1518     Respiratory Culture GRAM NEGATIVE DIETER  Moderate  Identification and susceptibility pending  Normal respiratory jose also present        PSEUDOMONAS AERUGINOSA  Moderate  Susceptibility pending       Gram Stain (Respiratory) <10 epithelial cells per low power field.     Gram Stain (Respiratory) Moderate WBC's     Gram Stain (Respiratory) Many Gram negative rods     Gram Stain (Respiratory) Many Gram negative diplococci    Narrative:      Mini-BAL.    Urine culture [[id number]] Collected: 08/26/20 0915    Order Status: Completed Specimen: Urine Updated: 08/27/20 1413     Urine Culture, Routine No significant growth    Narrative:      Specimen Source->Urine    Clostridium difficile EIA [[id number]] Collected: 08/27/20 0941    Order Status: Sent Specimen: Stool Updated: 08/27/20 1222    Blood culture [[id number]] Collected: 08/26/20 0305    Order Status: Completed Specimen: Blood from  Peripheral, Antecubital, Right Updated: 08/27/20 0613     Blood Culture, Routine No Growth to date      No Growth to date    Narrative:      Blood cultures from 2 different sites. 4 bottles total.  Please draw before starting antibiotics.    Blood culture [[id number]] Collected: 08/26/20 0318    Order Status: Completed Specimen: Blood from Peripheral, Upper Arm, Left Updated: 08/27/20 0613     Blood Culture, Routine No Growth to date      No Growth to date    Narrative:      Blood cultures x 2 different sites. 4 bottles total. Please  draw cultures before administering antibiotics.

## 2020-08-27 NOTE — PLAN OF CARE
[facility] Care Plan    POC reviewed with Glen Moscoso and pt mother at bedside at 1600. Pt unable to verbalize understanding due to being nonverbal at baseline. Pt extubated today, on RA, tolerating well. Flexi placed for large liquid stools, ok per MD Laura. CDiff sample sent. Pt on contact precautions. Started TF per order, increasing as ordered. Trending procal. Bath given. Questions and concerns addressed. No acute events today. Pt progressing toward goals. Will continue to monitor. See below and flowsheets for full assessment and VS info.       Neuro:  Jeannette Coma Scale  Best Eye Response: 4-->(E4) spontaneous  Best Motor Response: 5-->(M5) localizes pain  Best Verbal Response: 2-->(V2) incomprehensible speech  Jeannette Coma Scale Score: 11  Assessment Qualifiers: no eye obstruction present, patient not sedated/intubated        24 hr Temp:  [98.7 °F (37.1 °C)-100.3 °F (37.9 °C)]     CV:   Rhythm: normal sinus rhythm  BP goals:   MAP > 65    Resp:   O2 Device (Oxygen Therapy): room air  Vent Mode: Spont  Set Rate: 18 BPM  Oxygen Concentration (%): 40  Vt Set: 470 mL  PEEP/CPAP: 5 cmH20  Pressure Support: 5 cmH20    Plan: wean to extubate--extubated today, on RA tolerating well    GI/:  JUSTO Total Score: 0  Diet/Nutrition Received: tube feeding  Last Bowel Movement: 08/27/20  Voiding Characteristics: urethral catheter (bladder)  Flexi placed today    Intake/Output Summary (Last 24 hours) at 8/27/2020 1704  Last data filed at 8/27/2020 1628  Gross per 24 hour   Intake 2595.07 ml   Output 863 ml   Net 1732.07 ml     Unmeasured Output  Stool Occurrence: 1  Pad Count: 2    Labs/Accuchecks:  Recent Labs   Lab 08/27/20  0209   WBC 20.71*   RBC 3.05*   HGB 8.9*   HCT 27.9*   *      Recent Labs   Lab 08/27/20  0450 08/27/20  1419     --    K 2.7* 3.4*   CO2 13*  --    *  --    BUN 37*  --    CREATININE 1.1  --    ALKPHOS 105  --    ALT 21  --    AST 30  --    BILITOT 0.4  --     No results for  input(s): PROTIME, INR, APTT, HEPANTIXA in the last 168 hours. No results for input(s): CPK, CPKMB, TROPONINI, MB in the last 168 hours.    Electrolytes: Electrolytes replaced  Accuchecks: Q6H    Gtts:   sodium chloride 0.9% 50 mL/hr at 08/27/20 1605    dexmedetomidine (PRECEDEX) infusion Stopped (08/27/20 1305)       LDA/Wounds:  Lines/Drains/Airways       Drain                   Gastrostomy/Enterostomy 08/04/20 1653 feeding 23 days         NG/OG Tube 08/26/20 0200 [place] sump 1 day         Urethral Catheter 08/26/20 0300 16 Fr. 1 day         Rectal Tube 08/27/20 1100 rectal tube w/ balloon (indicate number of mLs) less than 1 day              Peripheral Intravenous Line                   Midline Catheter Insertion/Assessment  - Single Lumen 08/26/20 1145 Left brachial vein 18g x 8cm 1 day         Peripheral IV - Single Lumen 08/26/20 2124 22 G Anterior;Right Upper Arm less than 1 day                  Wounds: Yes  Wound care consulted: Yes

## 2020-08-28 PROBLEM — J18.9 PNEUMONIA: Status: ACTIVE | Noted: 2020-08-28

## 2020-08-28 LAB
ALBUMIN SERPL BCP-MCNC: 1.6 G/DL (ref 3.5–5.2)
ALP SERPL-CCNC: 92 U/L (ref 55–135)
ALT SERPL W/O P-5'-P-CCNC: 21 U/L (ref 10–44)
ANION GAP SERPL CALC-SCNC: 9 MMOL/L (ref 8–16)
ANISOCYTOSIS BLD QL SMEAR: SLIGHT
AST SERPL-CCNC: 24 U/L (ref 10–40)
BACTERIA SPEC AEROBE CULT: ABNORMAL
BASO STIPL BLD QL SMEAR: ABNORMAL
BASOPHILS # BLD AUTO: 0.05 K/UL (ref 0–0.2)
BASOPHILS NFR BLD: 0.2 % (ref 0–1.9)
BILIRUB SERPL-MCNC: 0.2 MG/DL (ref 0.1–1)
BUN SERPL-MCNC: 22 MG/DL (ref 6–20)
BURR CELLS BLD QL SMEAR: ABNORMAL
CALCIUM SERPL-MCNC: 7.4 MG/DL (ref 8.7–10.5)
CHLORIDE SERPL-SCNC: 125 MMOL/L (ref 95–110)
CO2 SERPL-SCNC: 15 MMOL/L (ref 23–29)
CREAT SERPL-MCNC: 0.9 MG/DL (ref 0.5–1.4)
DIFFERENTIAL METHOD: ABNORMAL
DOHLE BOD BLD QL SMEAR: PRESENT
EOSINOPHIL # BLD AUTO: 0.2 K/UL (ref 0–0.5)
EOSINOPHIL NFR BLD: 1.1 % (ref 0–8)
ERYTHROCYTE [DISTWIDTH] IN BLOOD BY AUTOMATED COUNT: 18.3 % (ref 11.5–14.5)
EST. GFR  (AFRICAN AMERICAN): >60 ML/MIN/1.73 M^2
EST. GFR  (NON AFRICAN AMERICAN): >60 ML/MIN/1.73 M^2
GLUCOSE SERPL-MCNC: 111 MG/DL (ref 70–110)
GRAM STN SPEC: ABNORMAL
HCT VFR BLD AUTO: 27.7 % (ref 40–54)
HGB BLD-MCNC: 9 G/DL (ref 14–18)
IMM GRANULOCYTES # BLD AUTO: 0.38 K/UL (ref 0–0.04)
IMM GRANULOCYTES NFR BLD AUTO: 1.8 % (ref 0–0.5)
LYMPHOCYTES # BLD AUTO: 2.7 K/UL (ref 1–4.8)
LYMPHOCYTES NFR BLD: 13.1 % (ref 18–48)
MAGNESIUM SERPL-MCNC: 2.2 MG/DL (ref 1.6–2.6)
MCH RBC QN AUTO: 29.6 PG (ref 27–31)
MCHC RBC AUTO-ENTMCNC: 32.5 G/DL (ref 32–36)
MCV RBC AUTO: 91 FL (ref 82–98)
MONOCYTES # BLD AUTO: 2.2 K/UL (ref 0.3–1)
MONOCYTES NFR BLD: 10.4 % (ref 4–15)
NEUTROPHILS # BLD AUTO: 15.2 K/UL (ref 1.8–7.7)
NEUTROPHILS NFR BLD: 73.4 % (ref 38–73)
NRBC BLD-RTO: 0 /100 WBC
PHOSPHATE SERPL-MCNC: 2.5 MG/DL (ref 2.7–4.5)
PLATELET # BLD AUTO: 427 K/UL (ref 150–350)
PLATELET BLD QL SMEAR: ABNORMAL
PMV BLD AUTO: 10.2 FL (ref 9.2–12.9)
POCT GLUCOSE: 103 MG/DL (ref 70–110)
POCT GLUCOSE: 109 MG/DL (ref 70–110)
POCT GLUCOSE: 119 MG/DL (ref 70–110)
POCT GLUCOSE: 122 MG/DL (ref 70–110)
POIKILOCYTOSIS BLD QL SMEAR: SLIGHT
POLYCHROMASIA BLD QL SMEAR: ABNORMAL
POTASSIUM SERPL-SCNC: 2.6 MMOL/L (ref 3.5–5.1)
POTASSIUM SERPL-SCNC: 3.4 MMOL/L (ref 3.5–5.1)
PROCALCITONIN SERPL IA-MCNC: 2.91 NG/ML
PROT SERPL-MCNC: 5.6 G/DL (ref 6–8.4)
RBC # BLD AUTO: 3.04 M/UL (ref 4.6–6.2)
SODIUM SERPL-SCNC: 149 MMOL/L (ref 136–145)
TOXIC GRANULES BLD QL SMEAR: PRESENT
WBC # BLD AUTO: 20.76 K/UL (ref 3.9–12.7)
WBC TOXIC VACUOLES BLD QL SMEAR: PRESENT

## 2020-08-28 PROCEDURE — 84132 ASSAY OF SERUM POTASSIUM: CPT

## 2020-08-28 PROCEDURE — 25000003 PHARM REV CODE 250: Performed by: NURSE PRACTITIONER

## 2020-08-28 PROCEDURE — 94761 N-INVAS EAR/PLS OXIMETRY MLT: CPT

## 2020-08-28 PROCEDURE — 99233 PR SUBSEQUENT HOSPITAL CARE,LEVL III: ICD-10-PCS | Mod: ,,, | Performed by: PSYCHIATRY & NEUROLOGY

## 2020-08-28 PROCEDURE — 20000000 HC ICU ROOM

## 2020-08-28 PROCEDURE — 85025 COMPLETE CBC W/AUTO DIFF WBC: CPT

## 2020-08-28 PROCEDURE — 63600175 PHARM REV CODE 636 W HCPCS: Performed by: NURSE PRACTITIONER

## 2020-08-28 PROCEDURE — 25000242 PHARM REV CODE 250 ALT 637 W/ HCPCS: Performed by: PHYSICIAN ASSISTANT

## 2020-08-28 PROCEDURE — 94640 AIRWAY INHALATION TREATMENT: CPT

## 2020-08-28 PROCEDURE — 99233 SBSQ HOSP IP/OBS HIGH 50: CPT | Mod: ,,, | Performed by: PSYCHIATRY & NEUROLOGY

## 2020-08-28 PROCEDURE — 84100 ASSAY OF PHOSPHORUS: CPT

## 2020-08-28 PROCEDURE — 99900026 HC AIRWAY MAINTENANCE (STAT)

## 2020-08-28 PROCEDURE — 63600175 PHARM REV CODE 636 W HCPCS: Performed by: PSYCHIATRY & NEUROLOGY

## 2020-08-28 PROCEDURE — 80053 COMPREHEN METABOLIC PANEL: CPT

## 2020-08-28 PROCEDURE — 25000003 PHARM REV CODE 250: Performed by: PHYSICIAN ASSISTANT

## 2020-08-28 PROCEDURE — 25000003 PHARM REV CODE 250: Performed by: PSYCHIATRY & NEUROLOGY

## 2020-08-28 PROCEDURE — 83735 ASSAY OF MAGNESIUM: CPT

## 2020-08-28 PROCEDURE — 63600175 PHARM REV CODE 636 W HCPCS: Performed by: PHYSICIAN ASSISTANT

## 2020-08-28 PROCEDURE — 25000242 PHARM REV CODE 250 ALT 637 W/ HCPCS: Performed by: NURSE PRACTITIONER

## 2020-08-28 PROCEDURE — 84145 PROCALCITONIN (PCT): CPT

## 2020-08-28 RX ORDER — POTASSIUM CHLORIDE 7.45 MG/ML
10 INJECTION INTRAVENOUS
Status: COMPLETED | OUTPATIENT
Start: 2020-08-28 | End: 2020-08-28

## 2020-08-28 RX ORDER — ALBUTEROL SULFATE 2.5 MG/.5ML
1.25 SOLUTION RESPIRATORY (INHALATION) EVERY 6 HOURS PRN
Status: DISCONTINUED | OUTPATIENT
Start: 2020-08-28 | End: 2020-08-28

## 2020-08-28 RX ORDER — SODIUM CHLORIDE AND POTASSIUM CHLORIDE 150; 900 MG/100ML; MG/100ML
INJECTION, SOLUTION INTRAVENOUS CONTINUOUS
Status: DISCONTINUED | OUTPATIENT
Start: 2020-08-28 | End: 2020-08-29

## 2020-08-28 RX ORDER — ALBUTEROL SULFATE 2.5 MG/.5ML
2.5 SOLUTION RESPIRATORY (INHALATION) EVERY 6 HOURS PRN
Status: DISCONTINUED | OUTPATIENT
Start: 2020-08-28 | End: 2020-09-06

## 2020-08-28 RX ADMIN — PROPRANOLOL HYDROCHLORIDE 20 MG: 10 TABLET ORAL at 09:08

## 2020-08-28 RX ADMIN — POTASSIUM CHLORIDE 10 MEQ: 7.46 INJECTION, SOLUTION INTRAVENOUS at 05:08

## 2020-08-28 RX ADMIN — PSYLLIUM HUSK 1 PACKET: 3.4 POWDER ORAL at 10:08

## 2020-08-28 RX ADMIN — CASTOR OIL AND BALSAM, PERU: 788; 87 OINTMENT TOPICAL at 08:08

## 2020-08-28 RX ADMIN — BACLOFEN 10 MG: 10 TABLET ORAL at 03:08

## 2020-08-28 RX ADMIN — PHENOBARBITAL 100 MG: 20 ELIXIR ORAL at 08:08

## 2020-08-28 RX ADMIN — MICONAZOLE NITRATE: 20 OINTMENT TOPICAL at 08:08

## 2020-08-28 RX ADMIN — HEPARIN SODIUM 5000 UNITS: 5000 INJECTION INTRAVENOUS; SUBCUTANEOUS at 08:08

## 2020-08-28 RX ADMIN — POTASSIUM CHLORIDE 10 MEQ: 7.46 INJECTION, SOLUTION INTRAVENOUS at 04:08

## 2020-08-28 RX ADMIN — POTASSIUM CHLORIDE 10 MEQ: 7.46 INJECTION, SOLUTION INTRAVENOUS at 06:08

## 2020-08-28 RX ADMIN — SODIUM CHLORIDE AND POTASSIUM CHLORIDE: 9; 1.49 INJECTION, SOLUTION INTRAVENOUS at 10:08

## 2020-08-28 RX ADMIN — PIPERACILLIN SODIUM AND TAZOBACTAM SODIUM 4.5 G: 4; .5 INJECTION, POWDER, LYOPHILIZED, FOR SOLUTION INTRAVENOUS at 10:08

## 2020-08-28 RX ADMIN — BACITRACIN: 500 OINTMENT TOPICAL at 08:08

## 2020-08-28 RX ADMIN — HEPARIN SODIUM 5000 UNITS: 5000 INJECTION INTRAVENOUS; SUBCUTANEOUS at 09:08

## 2020-08-28 RX ADMIN — CASTOR OIL AND BALSAM, PERU: 788; 87 OINTMENT TOPICAL at 09:08

## 2020-08-28 RX ADMIN — ALBUTEROL SULFATE 2.5 MG: 2.5 SOLUTION RESPIRATORY (INHALATION) at 09:08

## 2020-08-28 RX ADMIN — Medication 1 CAPSULE: at 08:08

## 2020-08-28 RX ADMIN — PROPRANOLOL HYDROCHLORIDE 20 MG: 10 TABLET ORAL at 08:08

## 2020-08-28 RX ADMIN — PIPERACILLIN SODIUM AND TAZOBACTAM SODIUM 4.5 G: 4; .5 INJECTION, POWDER, LYOPHILIZED, FOR SOLUTION INTRAVENOUS at 06:08

## 2020-08-28 RX ADMIN — FAMOTIDINE 20 MG: 20 TABLET, FILM COATED ORAL at 08:08

## 2020-08-28 RX ADMIN — PIPERACILLIN SODIUM AND TAZOBACTAM SODIUM 4.5 G: 4; .5 INJECTION, POWDER, LYOPHILIZED, FOR SOLUTION INTRAVENOUS at 03:08

## 2020-08-28 RX ADMIN — PROPRANOLOL HYDROCHLORIDE 20 MG: 10 TABLET ORAL at 03:08

## 2020-08-28 RX ADMIN — MICONAZOLE NITRATE: 20 OINTMENT TOPICAL at 09:08

## 2020-08-28 RX ADMIN — BACLOFEN 10 MG: 10 TABLET ORAL at 08:08

## 2020-08-28 RX ADMIN — BACLOFEN 10 MG: 10 TABLET ORAL at 09:08

## 2020-08-28 NOTE — SUBJECTIVE & OBJECTIVE
Interval History:  Procal was elevated. Infectious workup sent which showed his c diff, and urine culture being negative. His respiratory culture showing serratia marcesens as well as pseudomonas. Patient was already started on zosyn. Per mother he is back to his baseline and does not have any ICU needs.     Review of Systems   Constitutional: Negative for chills, fatigue and fever.   Psychiatric/Behavioral: Positive for behavioral problems and decreased concentration. Negative for agitation.       Objective:     Vitals:  Temp: 98.4 °F (36.9 °C)  Pulse: 95  Rhythm: normal sinus rhythm  BP: 109/70  MAP (mmHg): 85  Resp: (!) 23  SpO2: 98 %  O2 Device (Oxygen Therapy): room air    Temp  Min: 97.8 °F (36.6 °C)  Max: 99.9 °F (37.7 °C)  Pulse  Min: 84  Max: 102  BP  Min: 93/54  Max: 134/77  MAP (mmHg)  Min: 67  Max: 97  Resp  Min: 18  Max: 27  SpO2  Min: 96 %  Max: 100 %    08/27 0701 - 08/28 0700  In: 3375.3 [I.V.:1315.3]  Out: 1750 [Urine:1050]   Unmeasured Output  Stool Occurrence: 1  Emesis Occurrence: 0  Pad Count: 0       Physical Exam  Unable to test orientation, language, memory, judgment, insight, fund of knowledge, hearing, shoulder shrug, tongue protrusion, coordination, gait due to level of consciousness.    On examination pupils equal and reactive to light  Will spontaneously move his arms   Will move his head around spontaneously   This is his baseline      Medications:  Continuous0/9% NACL & POTASSIUM CHLORIDE 20 MEQ/L, Last Rate: 50 mL/hr at 08/28/20 1505    Scheduledbacitracin, , Daily  baclofen, 10 mg, TID  balsam peru-castor oiL, , BID  famotidine, 20 mg, Daily  heparin (porcine), 5,000 Units, Q12H  Lactobacillus rhamnosus GG, 1 capsule, Daily  miconazole nitrate 2%, , BID  PHENobarbitaL, 100 mg, QHS  piperacillin-tazobactam (ZOSYN) IVPB, 4.5 g, Q8H  propranoloL, 20 mg, TID  psyllium husk (aspartame), 1 packet, Daily    PRNacetaminophen, 650 mg, Q4H PRN  ondansetron, 4 mg, Q8H PRN  sodium chloride 0.9%,  10 mL, PRN      Today I personally reviewed pertinent medications, lines/drains/airways, imaging, cardiology results, laboratory results, microbiology results, notably:    Diet  Diet NPO  Diet NPO

## 2020-08-28 NOTE — CONSULTS
Therapy with vancomycin complete and/or consult discontinued by provider.  Pharmacy will sign off, please re-consult as needed.    Syeda Solo, PharmD  Neurocritical Care Pharmacist  [phone]

## 2020-08-28 NOTE — PLAN OF CARE
[facility] Care Plan    POC reviewed with Glen Moscoso and mother at 0300. Pt's mother verbalized understanding; pt nonverbal at baseline. Questions and concerns addressed with mother. No acute events overnight. Pt progressing toward goals. Will continue to monitor. See below and flowsheets for full assessment and VS info.   C.Diff result negative  Iglesias and Flexi in place  Wound care completed  NS @ 50cc/hr      Neuro:  [place] Coma Scale  Best Eye Response: 4-->(E4) spontaneous  Best Motor Response: 5-->(M5) localizes pain  Best Verbal Response: 2-->(V2) incomprehensible speech  Jeannette Coma Scale Score: 11  Assessment Qualifiers: patient not sedated/intubated, no eye obstruction present        24hr Temp:  [97.8 °F (36.6 °C)-100.3 °F (37.9 °C)]     CV:   Rhythm: normal sinus rhythm  BP goals:   SBP < 180  MAP > 65    Resp:   O2 Device (Oxygen Therapy): room air  Vent Mode: Spont  Set Rate: 18 BPM  Oxygen Concentration (%): 40  Vt Set: 470 mL  PEEP/CPAP: 5 cmH20  Pressure Support: 5 cmH20    Plan: N/A    GI/:  JUSTO Total Score: 0  Diet/Nutrition Received: NPO, tube feeding  Last Bowel Movement: 08/28/20  Voiding Characteristics: urethral catheter (bladder)    Intake/Output Summary (Last 24 hours) at 8/28/2020 0329  Last data filed at 8/28/2020 0305  Gross per 24 hour   Intake 3329.52 ml   Output 1310 ml   Net 2019.52 ml       Labs/Accuchecks:  Recent Labs   Lab 08/28/20  0126   WBC 20.76*   RBC 3.04*   HGB 9.0*   HCT 27.7*   *      Recent Labs   Lab 08/27/20  0450 08/27/20  1419     --    K 2.7* 3.4*   CO2 13*  --    *  --    BUN 37*  --    CREATININE 1.1  --    ALKPHOS 105  --    ALT 21  --    AST 30  --    BILITOT 0.4  --     No results for input(s): PROTIME, INR, APTT, HEPANTIXA in the last 168 hours. No results for input(s): CPK, CPKMB, TROPONINI, MB in the last 168 hours.    Electrolytes: Electrolytes replaced  Accuchecks: Q6H    Gtts:   sodium chloride 0.9% 50 mL/hr at 08/28/20 0303        LDA/Wounds:  Lines/Drains/Airways       Drain                   Gastrostomy/Enterostomy 08/04/20 1653 Percutaneous endoscopic gastrostomy (PEG) feeding 23 days         Urethral Catheter 08/26/20 0300 16 Fr. 2 days         Rectal Tube 08/27/20 1100 rectal tube w/ balloon (indicate number of mLs) less than 1 day              Peripheral Intravenous Line                   Midline Catheter Insertion/Assessment  - Single Lumen 08/26/20 1145 Left brachial vein 18g x 8cm 1 day         Peripheral IV - Single Lumen 08/26/20 2124 22 G Anterior;Right Upper Arm 1 day                  Wounds: Yes  Wound care consulted: Yes

## 2020-08-28 NOTE — PROGRESS NOTES
[facility]  Neurocritical Care  Progress Note    Admit Date: 8/26/2020  Service Date: 08/28/2020  Length of Stay: 2    Subjective:     Chief Complaint: Encephalopathy acute    History of Present Illness: Glen Moscoso is a 23 yo M with a PMH of CP, Seizures, and recent admission for hyponatremia who presents to [facility] for encephalopathy, sepsis, diarrhea, and possible seizure. He presented to OSH after being found unresponsive and in  Resp distress. He was found down by his mother upon EMS arrival he had agonal resp 3-4 per min, he was intubated by EMS and take to OSH ED. He was hypotensive on arrival started on Levo gtt, received antibiotics, ABG showed metabolic acidosis. He is being admitted to [facility] for a higher level of care, EGG and close monitoring.     Hospital Course: 8/26: Admit NCC: BC, antibiotics, EEG, Levo  08/27/2020 Extubated on 8/27/20. Tube feeds were started. Awaiting Urine culture. Patient had multiple episodes of diarrhea and had an extensive antibiotic history before admission, so will collect C diff. EEG does not show any epileptiform activity or discharge. Patient extubated and started on tube feeds.   08/28/2020 Procal was elevated. Infectious workup sent which showed his c diff, and urine culture being negative. His respiratory culture showing serratia marcesens as well as pseudomonas. Patient was already started on zosyn. Per mother he is back to his baseline and does not have any ICU needs.       Interval History:  Procal was elevated. Infectious workup sent which showed his c diff, and urine culture being negative. His respiratory culture showing serratia marcesens as well as pseudomonas. Patient was already started on zosyn. Per mother he is back to his baseline and does not have any ICU needs.     Review of Systems   Constitutional: Negative for chills, fatigue and fever.   Psychiatric/Behavioral: Positive for behavioral problems and decreased concentration. Negative for  agitation.       Objective:     Vitals:  Temp: 98.4 °F (36.9 °C)  Pulse: 95  Rhythm: normal sinus rhythm  BP: 109/70  MAP (mmHg): 85  Resp: (!) 23  SpO2: 98 %  O2 Device (Oxygen Therapy): room air    Temp  Min: 97.8 °F (36.6 °C)  Max: 99.9 °F (37.7 °C)  Pulse  Min: 84  Max: 102  BP  Min: 93/54  Max: 134/77  MAP (mmHg)  Min: 67  Max: 97  Resp  Min: 18  Max: 27  SpO2  Min: 96 %  Max: 100 %    08/27 0701 - 08/28 0700  In: 3375.3 [I.V.:1315.3]  Out: 1750 [Urine:1050]   Unmeasured Output  Stool Occurrence: 1  Emesis Occurrence: 0  Pad Count: 0       Physical Exam  Unable to test orientation, language, memory, judgment, insight, fund of knowledge, hearing, shoulder shrug, tongue protrusion, coordination, gait due to level of consciousness.    On examination pupils equal and reactive to light  Will spontaneously move his arms   Will move his head around spontaneously   This is his baseline      Medications:  Continuous0/9% NACL & POTASSIUM CHLORIDE 20 MEQ/L, Last Rate: 50 mL/hr at 08/28/20 1505    Scheduledbacitracin, , Daily  baclofen, 10 mg, TID  balsam peru-castor oiL, , BID  famotidine, 20 mg, Daily  heparin (porcine), 5,000 Units, Q12H  Lactobacillus rhamnosus GG, 1 capsule, Daily  miconazole nitrate 2%, , BID  PHENobarbitaL, 100 mg, QHS  piperacillin-tazobactam (ZOSYN) IVPB, 4.5 g, Q8H  propranoloL, 20 mg, TID  psyllium husk (aspartame), 1 packet, Daily    PRNacetaminophen, 650 mg, Q4H PRN  ondansetron, 4 mg, Q8H PRN  sodium chloride 0.9%, 10 mL, PRN      Today I personally reviewed pertinent medications, lines/drains/airways, imaging, cardiology results, laboratory results, microbiology results, notably:    Diet  Diet NPO  Diet NPO        Assessment/Plan:     Neuro  * Encephalopathy acute  - at baseline he is confused but able to interact and unable to walk  - found unresponsive    - EEG does not show any epileptiform activity   - Encephalopathy most likely due to a combination of dehydration and possible infection  (PNA)     Seizure  - EEG done at hospital does not show any electrographic seizures     Cerebral palsy  - is wheelchair bound and bed bound   -minimal interaction with surroundings   - Mother care giver     Derm  Decubitus ulcer of left buttock, stage 2  - consult wound care for DTI     Pulmonary  Pneumonia  Episodes of fever  Respiratory cultures showing pseudomonas and serratia marcesens   Started on zosyn 8/27 with possible end date on 9/2     Renal/  Hypokalemia  Chronic issue that is worsening with his diarrhea   On normal saline with kcl     Oncology  Leukocytosis  - WBC 25 OSH   - Trending downwards    - BC x 2, UA, negative   -Sent C diff due to multiple episodes of diarrhea which was negative   - respiratory cultures are positive--> please see PNA for further plan and assessment        Orthopedic  Deep tissue injury  - R buttock   - Wound care consult           The patient is being Prophylaxed for:  Venous Thromboembolism with: Chemical  Stress Ulcer with: Not Applicable   Ventilator Pneumonia with: not applicable    Activity Orders          Diet NPO: NPO starting at 08/26 0159        Full Code    Kaley Barcenas MD  Neurocritical Care  [facility]

## 2020-08-28 NOTE — ASSESSMENT & PLAN NOTE
- at baseline he is confused but able to interact and unable to walk  - found unresponsive    - EEG does not show any epileptiform activity   - Encephalopathy most likely due to a combination of dehydration and possible infection (PNA)

## 2020-08-28 NOTE — PROGRESS NOTES
Patient seen for wound care follow up for bilateral ischial pressure injuries, scrotal skin breakdown, upper sacrum skin breakdown and for new breakdown noted over right cheek(appears to be due to medical adhesive skin related injury from ET tube urbina). See photodocumentation below.    Recommendations:  Sizewise immerse surface utilized.  Turn every 2hrs. Pad bony prominences with pillows or foam dressings.  Deep tissue pressure injuries to bilateral ischium and skin breakdown to scrotum: venelex ointment BID/prn  (continue to monitor deep tissue pressure injuries as they continue to demarcate, full thickness skin loss currently noted to left ischium, both ischial pressure injuries may demarcate to further full thickness skin loss.)  Upper sacrum/buttocks: BID/prn apply criticaid clear barrier cream with miconazole.  Right cheek: weekly clean with sterile normal saline, dress with foam dressing.    Nursing to continue care. Wound care to follow prn.    Right ischium: dark purple discoloration with redden periwound, boggy to palpation 5cm x 5cm    Left ischium: dark purple/black discoloration with full thickness skin loss 80% dark purple/black discoloration, boggy to palpation, 20% yellow slough/non-viable tissue, redden periwound 5cm x 5cm x 0.3cm  Scrotum: 2cm x1cm x0.2cm shallow full thickness skin loss moist pink wound base, scant yellow slough noted    Upper sacrum/buttocks: satellite lesions    Right cheek: partial thickness skin loss, moist pink wound bed, no drainage or odor, 2cm x 2cm x 0.1cm

## 2020-08-28 NOTE — ASSESSMENT & PLAN NOTE
Episodes of fever  Respiratory cultures showing pseudomonas and serratia marcesens   Started on zosyn 8/27 with possible end date on 9/2

## 2020-08-28 NOTE — ASSESSMENT & PLAN NOTE
- WBC 25 OSH   - Trending downwards    - BC x 2, UA, negative   -Sent C diff due to multiple episodes of diarrhea which was negative   - respiratory cultures are positive--> please see PNA for further plan and assessment

## 2020-08-28 NOTE — ASSESSMENT & PLAN NOTE
- is wheelchair bound and bed bound   -minimal interaction with surroundings   - Mother care giver

## 2020-08-28 NOTE — RESIDENT HANDOFF
Handoff     Primary Team: Networked reference to record PCT  Room Number: 9089/9089 A     Patient Name: Glen Moscoso MRN: [id number]     Date of Birth: [id number] Allergies: Patient has no known allergies.     Age: 24 y.o. Admit Date: 8/26/2020     Sex: male  BMI: Body mass index is 15.8 kg/m².     Code Status: Full Code        Illness Level (current clinical status): Watcher - No    Reason for Admission: Encephalopathy acute    Brief HPI (pertinent PMH and diagnosis or differential diagnosis): 23 y/o M with a medical history of cerebral palsy, seizures (on phenobarb) with a recent admission for hyponatremia presented to the [facility] for acute encephalopathy. Patient was found to be unresponsive and in respiratory distress on 8/26. When he was found down by his mother upon EMS arrival, he has agonal respiration 3-4 per minute. Due to this reason he was emergently intubated and taken to the ED. On arrival they started him on levophed, and was started on broad spectrum antibiotics.     Please note:     Recently admitted [facility] from August 4-August 18 with hypokalemia, colitis, left buttock decubitus ulcer, fever of uncertain etiology, aspiration pneumonitis, leukocytosis, diarrhea, tachypnea, sinus tachycardia.  Serum bicarbonate August 11-18 ranged 13-17.  Multiple cultures to include respiratory cultures, blood cultures, and stool cultures (including C diff) did not have acute abnormalities.  COVID on August 9 was negative.  He was treated with p.o. vancomycin along with Levaquin and Flagyl.    Procedure Date:     Hospital Course (updated, brief assessment by system or problem, significant events): After arrival patient was started on broad spectrum antibiotics for his leukocytosis and fevers. Procalcitonin was elevated.  He was pan cultured with blood cultures showing NGTD, urine cx NGTD and C diff negative (having copious amounts of diarreah). Respiratory cultures are showing serratia marcescens and pseudomonas  aeruginosa. Thus zosyn was kept on and vancomycin was discontinued. Patient was also having multiple episodes of diarrhea thus psyillum was started (to end after three doses).     He was extubated on 8/27 and is currently on room air. At baseline he is nonverbal and minimally interactive. Will not follow commands.     EEG was done on 8/27 which showed diffuse disorganized slowing of the background which was very nonspecific. No seizures recorded in this study.     Patient on tube feeds     Tasks (specific, using if-then statements):   1) Acute encephalopathy   I believe that his initial cause of encephalopathy was a combination of dehydration as well as infection. Based on EEG there was no evidence of status. Patient is now back to his baseline     2) Hypokalemia   Chronically has low potassium which is worsening in the setting of diarrhea. Low dose KCl is being given through the IV with NS.     Contingency Plan (special circumstances anticipated and plan):     Estimated Discharge Date: TBD     Discharge Disposition: Hospital   Mentored By: Jose Sanchez

## 2020-08-29 LAB
ALBUMIN SERPL BCP-MCNC: 1.6 G/DL (ref 3.5–5.2)
ALP SERPL-CCNC: 87 U/L (ref 55–135)
ALT SERPL W/O P-5'-P-CCNC: 19 U/L (ref 10–44)
ANION GAP SERPL CALC-SCNC: 6 MMOL/L (ref 8–16)
AST SERPL-CCNC: 19 U/L (ref 10–40)
BASOPHILS # BLD AUTO: 0.06 K/UL (ref 0–0.2)
BASOPHILS NFR BLD: 0.4 % (ref 0–1.9)
BILIRUB SERPL-MCNC: 0.2 MG/DL (ref 0.1–1)
BUN SERPL-MCNC: 10 MG/DL (ref 6–20)
CALCIUM SERPL-MCNC: 7.4 MG/DL (ref 8.7–10.5)
CHLORIDE SERPL-SCNC: 128 MMOL/L (ref 95–110)
CO2 SERPL-SCNC: 16 MMOL/L (ref 23–29)
CREAT SERPL-MCNC: 0.7 MG/DL (ref 0.5–1.4)
DIFFERENTIAL METHOD: ABNORMAL
EOSINOPHIL # BLD AUTO: 0.3 K/UL (ref 0–0.5)
EOSINOPHIL NFR BLD: 2.2 % (ref 0–8)
ERYTHROCYTE [DISTWIDTH] IN BLOOD BY AUTOMATED COUNT: 18.2 % (ref 11.5–14.5)
EST. GFR  (AFRICAN AMERICAN): >60 ML/MIN/1.73 M^2
EST. GFR  (NON AFRICAN AMERICAN): >60 ML/MIN/1.73 M^2
GLUCOSE SERPL-MCNC: 106 MG/DL (ref 70–110)
HCT VFR BLD AUTO: 32.7 % (ref 40–54)
HGB BLD-MCNC: 9.9 G/DL (ref 14–18)
IMM GRANULOCYTES # BLD AUTO: 0.3 K/UL (ref 0–0.04)
IMM GRANULOCYTES NFR BLD AUTO: 2.1 % (ref 0–0.5)
LYMPHOCYTES # BLD AUTO: 2.6 K/UL (ref 1–4.8)
LYMPHOCYTES NFR BLD: 18.4 % (ref 18–48)
MAGNESIUM SERPL-MCNC: 2 MG/DL (ref 1.6–2.6)
MCH RBC QN AUTO: 29.3 PG (ref 27–31)
MCHC RBC AUTO-ENTMCNC: 30.3 G/DL (ref 32–36)
MCV RBC AUTO: 97 FL (ref 82–98)
MONOCYTES # BLD AUTO: 1.3 K/UL (ref 0.3–1)
MONOCYTES NFR BLD: 9.1 % (ref 4–15)
NEUTROPHILS # BLD AUTO: 9.5 K/UL (ref 1.8–7.7)
NEUTROPHILS NFR BLD: 67.8 % (ref 38–73)
NRBC BLD-RTO: 0 /100 WBC
PHOSPHATE SERPL-MCNC: 1.8 MG/DL (ref 2.7–4.5)
PLATELET # BLD AUTO: 328 K/UL (ref 150–350)
PMV BLD AUTO: 10 FL (ref 9.2–12.9)
POCT GLUCOSE: 112 MG/DL (ref 70–110)
POCT GLUCOSE: 112 MG/DL (ref 70–110)
POCT GLUCOSE: 132 MG/DL (ref 70–110)
POCT GLUCOSE: 136 MG/DL (ref 70–110)
POCT GLUCOSE: 77 MG/DL (ref 70–110)
POTASSIUM SERPL-SCNC: 3.6 MMOL/L (ref 3.5–5.1)
PROCALCITONIN SERPL IA-MCNC: 0.96 NG/ML
PROT SERPL-MCNC: 5.6 G/DL (ref 6–8.4)
RBC # BLD AUTO: 3.38 M/UL (ref 4.6–6.2)
SODIUM SERPL-SCNC: 150 MMOL/L (ref 136–145)
WBC # BLD AUTO: 14.02 K/UL (ref 3.9–12.7)

## 2020-08-29 PROCEDURE — 85025 COMPLETE CBC W/AUTO DIFF WBC: CPT

## 2020-08-29 PROCEDURE — 84145 PROCALCITONIN (PCT): CPT

## 2020-08-29 PROCEDURE — 25000003 PHARM REV CODE 250: Performed by: NURSE PRACTITIONER

## 2020-08-29 PROCEDURE — 25000242 PHARM REV CODE 250 ALT 637 W/ HCPCS: Performed by: NURSE PRACTITIONER

## 2020-08-29 PROCEDURE — 63600175 PHARM REV CODE 636 W HCPCS: Performed by: PHYSICIAN ASSISTANT

## 2020-08-29 PROCEDURE — 63600175 PHARM REV CODE 636 W HCPCS: Performed by: PSYCHIATRY & NEUROLOGY

## 2020-08-29 PROCEDURE — 25000003 PHARM REV CODE 250: Performed by: PHYSICIAN ASSISTANT

## 2020-08-29 PROCEDURE — 84100 ASSAY OF PHOSPHORUS: CPT

## 2020-08-29 PROCEDURE — 80053 COMPREHEN METABOLIC PANEL: CPT

## 2020-08-29 PROCEDURE — 94640 AIRWAY INHALATION TREATMENT: CPT

## 2020-08-29 PROCEDURE — 83735 ASSAY OF MAGNESIUM: CPT

## 2020-08-29 PROCEDURE — 11000001 HC ACUTE MED/SURG PRIVATE ROOM

## 2020-08-29 PROCEDURE — 25000003 PHARM REV CODE 250: Performed by: PSYCHIATRY & NEUROLOGY

## 2020-08-29 PROCEDURE — 25000242 PHARM REV CODE 250 ALT 637 W/ HCPCS: Performed by: PHYSICIAN ASSISTANT

## 2020-08-29 PROCEDURE — 94761 N-INVAS EAR/PLS OXIMETRY MLT: CPT

## 2020-08-29 RX ORDER — CIPROFLOXACIN 2 MG/ML
400 INJECTION, SOLUTION INTRAVENOUS
Status: COMPLETED | OUTPATIENT
Start: 2020-08-29 | End: 2020-09-01

## 2020-08-29 RX ADMIN — PIPERACILLIN SODIUM AND TAZOBACTAM SODIUM 4.5 G: 4; .5 INJECTION, POWDER, LYOPHILIZED, FOR SOLUTION INTRAVENOUS at 06:08

## 2020-08-29 RX ADMIN — ALBUTEROL SULFATE 2.5 MG: 2.5 SOLUTION RESPIRATORY (INHALATION) at 08:08

## 2020-08-29 RX ADMIN — MICONAZOLE NITRATE: 20 OINTMENT TOPICAL at 08:08

## 2020-08-29 RX ADMIN — BACLOFEN 10 MG: 10 TABLET ORAL at 03:08

## 2020-08-29 RX ADMIN — PROPRANOLOL HYDROCHLORIDE 20 MG: 10 TABLET ORAL at 08:08

## 2020-08-29 RX ADMIN — MICONAZOLE NITRATE: 20 OINTMENT TOPICAL at 09:08

## 2020-08-29 RX ADMIN — SODIUM CHLORIDE AND POTASSIUM CHLORIDE: 9; 1.49 INJECTION, SOLUTION INTRAVENOUS at 05:08

## 2020-08-29 RX ADMIN — BACITRACIN: 500 OINTMENT TOPICAL at 08:08

## 2020-08-29 RX ADMIN — FAMOTIDINE 20 MG: 20 TABLET, FILM COATED ORAL at 08:08

## 2020-08-29 RX ADMIN — CASTOR OIL AND BALSAM, PERU: 788; 87 OINTMENT TOPICAL at 08:08

## 2020-08-29 RX ADMIN — PHENOBARBITAL 100 MG: 20 ELIXIR ORAL at 08:08

## 2020-08-29 RX ADMIN — Medication 1 CAPSULE: at 08:08

## 2020-08-29 RX ADMIN — CIPROFLOXACIN 400 MG: 2 INJECTION, SOLUTION INTRAVENOUS at 12:08

## 2020-08-29 RX ADMIN — HEPARIN SODIUM 5000 UNITS: 5000 INJECTION INTRAVENOUS; SUBCUTANEOUS at 08:08

## 2020-08-29 RX ADMIN — PSYLLIUM HUSK 1 PACKET: 3.4 POWDER ORAL at 08:08

## 2020-08-29 RX ADMIN — BACLOFEN 10 MG: 10 TABLET ORAL at 08:08

## 2020-08-29 RX ADMIN — CIPROFLOXACIN 400 MG: 2 INJECTION, SOLUTION INTRAVENOUS at 07:08

## 2020-08-29 RX ADMIN — PROPRANOLOL HYDROCHLORIDE 20 MG: 10 TABLET ORAL at 03:08

## 2020-08-29 NOTE — HOSPITAL COURSE
Patient admitted to Neuro ICU and started on broad spectrum antibiotics (Vancomcyin and Zosyn) for his leukocytosis and fevers and elevated procalcitonin of 6. Patient was pancultured with blood cultures showing NGTD, urine cx NGTD and C diff negative. Respiratory cultures grew Serratia marcescens and Pseudomonas aeruginosa. After respiratory cultures returned Vancomycin discontinued and patient continued on IV Zosyn. Patient was extubated on 8/27 and able to be weaned to room air while in Neuro ICU. At baseline he is nonverbal and minimally interactive. Will not follow commands. EEG was done on 8/27 which showed diffuse disorganized slowing of the background which was very nonspecific. No seizures recorded in this study. Patient restarted on home TF via PEG after extubation and free water flushes via PEG for hypernatremia noted on 8/29 of 150 with improvement. Patient was stepped down from Neuro ICU on evening of 8/29. After stepdown on evening of 8/29 patient with temperature of 102 F. Rapid response called but no new orders given. Prior to stepdown from Neuo ICU patient switched from IV Zosyn to Cipro after sensitivities returned from respiratory cultures. Patient on 8/30 had another rapid response called as patient developed stridor and labored breathing. Patient given Albuterol nebulizer treatment and nebulized racemic Epinephrine with improvement. Dr. Hitchcock and Neuro ICU team came to bedside and patient did improve after racemic Epi. Patient had ABG done that showed 7.45/31/101/98%. Stridor felt to be mechanical in nature as improved when patient sat up and when his chin was pulled up. Patien sent back to Neuro ICU for close monitoring. Patient did not require reintubation and was aggressively suctioned, placed on cough assist and CPT. Stridor felt mechanical as improved with jaw thrust and holding patient's jaw closed. Patient stepped back down to floor on 9/3. Wound care following patient and patient does  have several areas of skin breakdown and most notably unstageable eschar to bilateral ischium and wound care recommending general surgery consult for evaluation and general surgery consulted on step down to floor. Patient's repeat sputum again grew Serratia and Pseudomonas so ID consulted and recommended resuming IV Zosyn to treat pneumonia and to cover wound and recommended to continue until 9/17. Bilateral ischial ulcers with eschar were sharply debrided bedside using #10 scalpel to healthy bleeding tissue on 9/7. The right ulcer had underlying abscess cavity that tracked inferior and laterally about 2cm. The bases of both ulcers had fibrinous exudate debrided. Both sides packed with wet-to-dry gauze dressings. Patient after stepdown had issues with diarrhea and abdominal pain which was not new as patient had a recent hospitalization at [facility] in 8/2020 for treatment for colitis. GI performed flex sig on  On 9/9 and not acute abnormalities noted and random biopsies taken. Repeat stool studies done and negative for infectious etiology for diarrhea. ID reassessed patient on 9/10 and recommending to extend IV Zosyn until 9/24 to cover infected ischial orders. Diarrhea improving on 9/11. Rectal tube place to help prevent contamination of open wounds on bilateral ischium. Patient with decreased stool since started on scheduled Imodium on 9/11. PICC line team consulted and midline placed for long term antibiotics for home on 9/14. Home Health orders written and likely home discharge with  nursing and home infusion for antibiotics by end of this week. Diarrhea is greatly improved with scheduled Imodium. Final pathology from colon biopsies showed colonic mucosa with mild acute colitis, glandular atrophy/dropout and patchy apoptosis and considerations include infection and drug/toxin. No features chronic inflammatory bowel disease. CMV negative.  Infectious work-up was negative so doubt infectious cause. Patient with  fever earlier this am around midnight on 9/16 to 101F . Tylenol given and fever resolved. U/A and blood cultures ordered this am to evaluate. Patient's lungs remain clear on exam and breathing normally and in no distress. Source of fever unclear at this time and will monitor for now.

## 2020-08-29 NOTE — PROGRESS NOTES
Notified AMARI Perez of Na 150, chloride critical at 128 and K of 3.6. Verbal order to discontinue NS with K. JOVAN.

## 2020-08-29 NOTE — PLAN OF CARE
POC reviewed with pt and his mother at 0500. Pt's mother verbalized understanding. Questions and concerns addressed with mother. No acute events overnight. Pt progressing toward goals, transfer orders in place, waiting for a bed to become available. Will continue to monitor. See flowsheets for full assessment and VS info        Problem: Skin and Tissue Injury (Artificial Airway)  Goal: Absence of Device-Related Skin or Tissue Injury  Outcome: Ongoing, Progressing  Intervention: Maintain Skin and Tissue Health  Flowsheets (Taken 8/29/2020 4035)  Device Skin Pressure Protection:   absorbent pad utilized/changed   adhesive use limited   positioning supports utilized   pressure points protected     Problem: Skin Injury Risk Increased  Goal: Skin Health and Integrity  Outcome: Ongoing, Progressing  Intervention: Optimize Skin Protection  Flowsheets (Taken 8/29/2020 3163)  Pressure Reduction Techniques:   frequent weight shift encouraged   pressure points protected   weight shift assistance provided  Head of Bed (HOB): HOB at 30 degrees

## 2020-08-29 NOTE — PROGRESS NOTES
[facility]  Neurocritical Care  Progress Note    Admit Date: 8/26/2020  Service Date: 08/29/2020  Length of Stay: 3    Subjective:     Chief Complaint: Encephalopathy acute    History of Present Illness: Glen Moscoso is a 25 yo M with a PMH of CP, Seizures, and recent admission for hyponatremia who presents to [facility] for encephalopathy, sepsis, diarrhea, and possible seizure. He presented to OSH after being found unresponsive and in  Resp distress. He was found down by his mother upon EMS arrival he had agonal resp 3-4 per min, he was intubated by EMS and take to OSH ED. He was hypotensive on arrival started on Levo gtt, received antibiotics, ABG showed metabolic acidosis. He is being admitted to [facility] for a higher level of care, EGG and close monitoring.     Hospital Course: 8/26: Admit NCC: BC, antibiotics, EEG, Levo  08/27/2020 Extubated on 8/27/20. Tube feeds were started. Awaiting Urine culture. Patient had multiple episodes of diarrhea and had an extensive antibiotic history before admission, so will collect C diff. EEG does not show any epileptiform activity or discharge. Patient extubated and started on tube feeds.   08/28/2020 Procal was elevated. Infectious workup sent which showed his c diff, and urine culture being negative. His respiratory culture showing serratia marcesens as well as pseudomonas. Patient was already started on zosyn. Per mother he is back to his baseline and does not have any ICU needs.   8/29/2020: Procal back down, d/c'd NS with KCl d/t Na increase, started enteric water flushes 150cc q6h and one time bolus 250cc, d/c'd zosyn and narrowed to cipro for PNA    Interval History:  Respiratory culture sensitivity came back. D/c'd zosyn and narrowed to Cipro for Serratia marcescens and Pseudomonas coverage. Procal decreased from 2.91 yesterday to 0.96 today, no fevers and decreased WBC. On gross examination, he sounds congested, but improves with albuterol/humidified air.  D/c'd NS with KCl d/t Na increase, started enteric water flushes 150cc q6h and one time bolus 250cc. Day 2/3 for psyllium. Continued watery stool today. Can reassess tomorrow and add 3 more days of psyllium if continues. Per pt's mother, pt is back to baseline neuro status.     Review of Systems   Constitutional: Negative for chills and fever.   Eyes: Negative for discharge.   Psychiatric/Behavioral: Negative for agitation.   Unable to assess full ROS due to level of consciousness.    Objective:     Vitals:  Temp: 99.3 °F (37.4 °C)  Pulse: 107  Rhythm: normal sinus rhythm  BP: 120/64  MAP (mmHg): 84  Resp: (!) 26  SpO2: 97 %  Oxygen Concentration (%): 21  O2 Device (Oxygen Therapy): Simple Face Mask    Temp  Min: 97.4 °F (36.3 °C)  Max: 99.5 °F (37.5 °C)  Pulse  Min: 68  Max: 107  BP  Min: 94/57  Max: 140/76  MAP (mmHg)  Min: 68  Max: 105  Resp  Min: 16  Max: 26  SpO2  Min: 94 %  Max: 100 %  Oxygen Concentration (%)  Min: 21  Max: 21    08/28 0701 - 08/29 0700  In: 2341.7 [I.V.:1161.7]  Out: 1485 [Urine:1385; Drains:100]   Unmeasured Output  Stool Occurrence: 1  Emesis Occurrence: 0  Pad Count: 0       Physical Exam  HENT:      Head: Normocephalic and atraumatic.      Right Ear: External ear normal.      Left Ear: External ear normal.      Nose: Nose normal.      Mouth/Throat:      Mouth: Mucous membranes are moist.   Eyes:      Conjunctiva/sclera: Conjunctivae normal.   Cardiovascular:      Rate and Rhythm: Normal rate and regular rhythm.   Pulmonary:      Effort: Pulmonary effort is normal.      Breath sounds: Normal breath sounds.      Comments: Sounds congested, but no congestion on auscultation  On RA, congestion improves with albuterol/humidified air  Abdominal:      General: Abdomen is flat. There is no distension.      Palpations: Abdomen is soft. There is no mass.      Tenderness: There is no abdominal tenderness. There is no guarding.      Hernia: No hernia is present.   Musculoskeletal:      Right lower  leg: No edema.      Left lower leg: No edema.   Skin:     General: Skin is warm and dry.      Capillary Refill: Capillary refill takes less than 2 seconds.   Neurological:      Mental Status: He is alert.      Comments: Alert. Unable to assess orientation d/t level of consciousness  GCS E4 V1M5  Responsive to verbal and tactile stimulation.  Minimally interactive, but that is baseline  Does not follow commands, that is baseline  PERRL bilat 3mm  Cough intact, tracks with eyes and crosses midline  Moves UE spontaneously.   Unable to assess strength grade d/t inability to follow commands   Psychiatric:         Behavior: Behavior is not agitated.           Medications:  Continuous Scheduledbacitracin, , Daily  baclofen, 10 mg, TID  balsam peru-castor oiL, , BID  ciprofloxacin, 400 mg, Q8H  heparin (porcine), 5,000 Units, Q12H  Lactobacillus rhamnosus GG, 1 capsule, Daily  miconazole nitrate 2%, , BID  PHENobarbitaL, 100 mg, QHS  propranoloL, 20 mg, TID  psyllium husk (aspartame), 1 packet, Daily    PRNacetaminophen, 650 mg, Q4H PRN  albuterol sulfate, 2.5 mg, Q6H PRN  ondansetron, 4 mg, Q8H PRN  sodium chloride 0.9%, 10 mL, PRN      Today I personally reviewed pertinent medications, lines/drains/airways, imaging, cardiology results, laboratory results, microbiology results, notably: CBC, CMP, Respiratory cx, POCT glu    Diet  Diet NPO  Diet NPO        Assessment/Plan:     Neuro  * Encephalopathy acute  - at baseline he is confused but able to interact and unable to walk  - found unresponsive    - EEG does not show any epileptiform activity   - Encephalopathy most likely due to a combination of dehydration and Pneumonia    -per pt mother, pt has returned to baseline neuro status    Seizure  - EEG done at hospital does not show any electrographic seizures     Derm  Decubitus ulcer of left buttock, stage 2  - consult wound care for DTI     Pulmonary  Pneumonia  -Respiratory cultures showing pseudomonas and serratia  sherry   -Started on zosyn 8/27. D/c'd zosyn 8/29 and narrowed to Cipro 8/29 x 7days  - WBC trending down and no fevers  - procal trending down   - HOB at 45 degrees  - continue albuterol/humidified air as on gross examination he sounds congested with mouth breathing but improves with these interventions      Renal/  Hypokalemia  -Chronic issue that is worsening with his diarrhea   - d/c'd normal saline with kcl d/t increase in Na  - potassium back up at this time  - enteral water flushes and one bolus. Recheck electrolytes    Oncology  Leukocytosis  - WBC 25 OSH   - Trending downwards    - BC x 2, UA, negative   -Sent C diff due to multiple episodes of diarrhea which was negative   - respiratory cultures are positive--> please see PNA for further plan and assessment        Endocrine  Moderate malnutrition  - continue tube feeds    Orthopedic  Deep tissue injury  - R buttock   - Wound care consult         The patient is being Prophylaxed for:  Venous Thromboembolism with: Chemical  Stress Ulcer with: None  Ventilator Pneumonia with: not applicable    Activity Orders          Diet NPO: NPO starting at 08/26 0159        Full Code    Ketty Mckeon PA-C  Neurocritical Care  [facility]

## 2020-08-29 NOTE — SUBJECTIVE & OBJECTIVE
Interval History:  Respiratory culture sensitivity came back. D/c'd zosyn and narrowed to Cipro for Serratia marcescens and Pseudomonas coverage. Procal decreased from 2.91 yesterday to 0.96 today, no fevers and decreased WBC. On gross examination, he sounds congested, but improves with albuterol/humidified air. D/c'd NS with KCl d/t Na increase, started enteric water flushes 150cc q6h and one time bolus 250cc. Day 2/3 for psyllium. Continued watery stool today. Can reassess tomorrow and add 3 more days of psyllium if continues. Per pt's mother, pt is back to baseline neuro status.     Review of Systems   Constitutional: Negative for chills and fever.   Eyes: Negative for discharge.   Psychiatric/Behavioral: Negative for agitation.   Unable to assess full ROS due to level of consciousness.    Objective:     Vitals:  Temp: 99.3 °F (37.4 °C)  Pulse: 107  Rhythm: normal sinus rhythm  BP: 120/64  MAP (mmHg): 84  Resp: (!) 26  SpO2: 97 %  Oxygen Concentration (%): 21  O2 Device (Oxygen Therapy): Simple Face Mask    Temp  Min: 97.4 °F (36.3 °C)  Max: 99.5 °F (37.5 °C)  Pulse  Min: 68  Max: 107  BP  Min: 94/57  Max: 140/76  MAP (mmHg)  Min: 68  Max: 105  Resp  Min: 16  Max: 26  SpO2  Min: 94 %  Max: 100 %  Oxygen Concentration (%)  Min: 21  Max: 21    08/28 0701 - 08/29 0700  In: 2341.7 [I.V.:1161.7]  Out: 1485 [Urine:1385; Drains:100]   Unmeasured Output  Stool Occurrence: 1  Emesis Occurrence: 0  Pad Count: 0       Physical Exam  HENT:      Head: Normocephalic and atraumatic.      Right Ear: External ear normal.      Left Ear: External ear normal.      Nose: Nose normal.      Mouth/Throat:      Mouth: Mucous membranes are moist.   Eyes:      Conjunctiva/sclera: Conjunctivae normal.   Cardiovascular:      Rate and Rhythm: Normal rate and regular rhythm.   Pulmonary:      Effort: Pulmonary effort is normal.      Breath sounds: Normal breath sounds.      Comments: Sounds congested, but no congestion on auscultation  On RA,  congestion improves with albuterol/humidified air  Abdominal:      General: Abdomen is flat. There is no distension.      Palpations: Abdomen is soft. There is no mass.      Tenderness: There is no abdominal tenderness. There is no guarding.      Hernia: No hernia is present.   Musculoskeletal:      Right lower leg: No edema.      Left lower leg: No edema.   Skin:     General: Skin is warm and dry.      Capillary Refill: Capillary refill takes less than 2 seconds.   Neurological:      Mental Status: He is alert.      Comments: Alert. Unable to assess orientation d/t level of consciousness  GCS E4 V1M5  Responsive to verbal and tactile stimulation.  Minimally interactive, but that is baseline  Does not follow commands, that is baseline  PERRL bilat 3mm  Cough intact, tracks with eyes and crosses midline  Moves UE spontaneously.   Unable to assess strength grade d/t inability to follow commands   Psychiatric:         Behavior: Behavior is not agitated.           Medications:  Continuous Scheduledbacitracin, , Daily  baclofen, 10 mg, TID  balsam peru-castor oiL, , BID  ciprofloxacin, 400 mg, Q8H  heparin (porcine), 5,000 Units, Q12H  Lactobacillus rhamnosus GG, 1 capsule, Daily  miconazole nitrate 2%, , BID  PHENobarbitaL, 100 mg, QHS  propranoloL, 20 mg, TID  psyllium husk (aspartame), 1 packet, Daily    PRNacetaminophen, 650 mg, Q4H PRN  albuterol sulfate, 2.5 mg, Q6H PRN  ondansetron, 4 mg, Q8H PRN  sodium chloride 0.9%, 10 mL, PRN      Today I personally reviewed pertinent medications, lines/drains/airways, imaging, cardiology results, laboratory results, microbiology results, notably: CBC, CMP, Respiratory cx, POCT glu    Diet  Diet NPO  Diet NPO

## 2020-08-29 NOTE — ASSESSMENT & PLAN NOTE
- at baseline he is confused but able to interact and unable to walk  - found unresponsive    - EEG does not show any epileptiform activity   - Encephalopathy most likely due to a combination of dehydration and Pneumonia    -per pt mother, pt has returned to baseline neuro status

## 2020-08-29 NOTE — PLAN OF CARE
POC reviewed with pt and family at 1400. Pt unable to verbalize understanding. Questions and concerns addressed. No acute events today. Pt progressing toward goals. Last K checked 3.4. Will continue to monitor. See flowsheets for full assessment and VS info.

## 2020-08-29 NOTE — HPI
23 y/o male with cerebral palsy, seizures, and recent admission for hyponatremia who presents to [facility] for encephalopathy, sepsis, diarrhea, and possible seizure. He presented to OSH after being found unresponsive and in resp distress. He was found down by his mother upon EMS arrival he had agonal resp 3-4 per min, he was intubated by EMS and take to OSH ED. He was hypotensive on arrival started on Levo gtt, received antibiotics, ABG showed metabolic acidosis. He is being admitted to [facility] for a higher level of care, EGG and close monitoring.   [facility] with hypokalemia, colitis, left buttock decubitus ulcer, fever of uncertain etiology, aspiration pneumonitis, leukocytosis, diarrhea, tachypnea, sinus tachycardia. Serum bicarbonate August 11-18 ranged 13-17. Multiple cultures to include respiratory cultures, blood cultures, and stool cultures (including C diff) did not have acute abnormalities. COVID on August 9 was negative. He was treated with p.o. vancomycin along with Levaquin and Flagyl.

## 2020-08-29 NOTE — PLAN OF CARE
POC reviewed with pt and family at 1400. Pt's mom verbalized understanding. Questions and concerns addressed. Enteral water flushes added. Bladder scan complete. Wound care complete per orders. Bath given. Pt turned q2h. Plan for transfer. Will continue to monitor. See flowsheets for full assessment and VS info.      Problem: Adult Inpatient Plan of Care  Goal: Optimal Comfort and Wellbeing  Intervention: Provide Person-Centered Care  Flowsheets (Taken 8/29/2020 1732)  Trust Relationship/Rapport:   care explained   emotional support provided   questions answered   questions encouraged   thoughts/feelings acknowledged     Problem: Skin and Tissue Injury (Mechanical Ventilation, Invasive)  Goal: Absence of Device-Related Skin and Tissue Injury  Intervention: Maintain Skin and Tissue Health  Flowsheets (Taken 8/29/2020 1732)  Device Skin Pressure Protection:   absorbent pad utilized/changed   adhesive use limited   pressure points protected   skin-to-device areas padded   skin-to-skin areas padded   positioning supports utilized     Problem: Skin and Tissue Injury (Artificial Airway)  Goal: Absence of Device-Related Skin or Tissue Injury  Intervention: Maintain Skin and Tissue Health  Flowsheets (Taken 8/29/2020 1732)  Device Skin Pressure Protection:   absorbent pad utilized/changed   adhesive use limited   pressure points protected   skin-to-device areas padded   skin-to-skin areas padded   positioning supports utilized     Problem: Infection  Goal: Infection Symptom Resolution  Intervention: Prevent or Manage Infection  Flowsheets (Taken 8/29/2020 1732)  Fever Reduction/Comfort Measures:   lightweight bedding   lightweight clothing  Infection Management: aseptic technique maintained  Isolation Precautions: protective environment maintained

## 2020-08-29 NOTE — ASSESSMENT & PLAN NOTE
-Chronic issue that is worsening with his diarrhea   - d/c'd normal saline with kcl d/t increase in Na  - potassium back up at this time  - enteral water flushes and one bolus. Recheck electrolytes

## 2020-08-29 NOTE — ASSESSMENT & PLAN NOTE
-Respiratory cultures showing pseudomonas and serratia marcesens   -Started on zosyn 8/27. D/c'd zosyn 8/29 and narrowed to Cipro 8/29 x 7days  - WBC trending down and no fevers  - procal trending down   - HOB at 45 degrees  - continue albuterol/humidified air as on gross examination he sounds congested with mouth breathing but improves with these interventions

## 2020-08-30 PROBLEM — R06.1 BIPHASIC STRIDOR: Status: ACTIVE | Noted: 2020-08-30

## 2020-08-30 PROBLEM — R06.82 TACHYPNEA: Status: RESOLVED | Noted: 2020-08-04 | Resolved: 2020-08-30

## 2020-08-30 PROBLEM — E83.39 HYPOPHOSPHATEMIA: Status: ACTIVE | Noted: 2020-08-30

## 2020-08-30 PROBLEM — J15.1 PNEUMONIA DUE TO PSEUDOMONAS SPECIES: Status: ACTIVE | Noted: 2020-08-04

## 2020-08-30 PROBLEM — K52.9 COLITIS: Status: RESOLVED | Noted: 2020-08-17 | Resolved: 2020-08-30

## 2020-08-30 PROBLEM — D63.8 ANEMIA OF CHRONIC DISEASE: Status: ACTIVE | Noted: 2020-08-30

## 2020-08-30 PROBLEM — R19.7 DIARRHEA OF PRESUMED INFECTIOUS ORIGIN: Status: RESOLVED | Noted: 2020-08-04 | Resolved: 2020-08-30

## 2020-08-30 PROBLEM — E87.0 HYPERNATREMIA: Status: ACTIVE | Noted: 2020-08-30

## 2020-08-30 PROBLEM — A49.8 BACTERIAL INFECTION DUE TO SERRATIA: Status: ACTIVE | Noted: 2020-08-30

## 2020-08-30 PROBLEM — G40.909 SEIZURE DISORDER: Status: ACTIVE | Noted: 2020-08-26

## 2020-08-30 PROBLEM — G40.901 STATUS EPILEPTICUS: Status: RESOLVED | Noted: 2020-08-26 | Resolved: 2020-08-30

## 2020-08-30 PROBLEM — R00.0 SINUS TACHYCARDIA: Status: RESOLVED | Noted: 2020-08-04 | Resolved: 2020-08-30

## 2020-08-30 PROBLEM — E43 SEVERE PROTEIN-CALORIE MALNUTRITION: Status: ACTIVE | Noted: 2020-08-27

## 2020-08-30 PROBLEM — J18.9 PNEUMONIA: Status: RESOLVED | Noted: 2020-08-28 | Resolved: 2020-08-30

## 2020-08-30 PROBLEM — J96.01 ACUTE RESPIRATORY FAILURE WITH HYPOXIA: Status: ACTIVE | Noted: 2020-08-30

## 2020-08-30 PROBLEM — J69.0 ASPIRATION PNEUMONITIS: Status: RESOLVED | Noted: 2020-08-09 | Resolved: 2020-08-30

## 2020-08-30 PROBLEM — R50.9 FEVER IN ADULT: Status: RESOLVED | Noted: 2020-08-09 | Resolved: 2020-08-30

## 2020-08-30 LAB
ALBUMIN SERPL BCP-MCNC: 1.6 G/DL (ref 3.5–5.2)
ALLENS TEST: ABNORMAL
ALP SERPL-CCNC: 79 U/L (ref 55–135)
ALT SERPL W/O P-5'-P-CCNC: 18 U/L (ref 10–44)
ANION GAP SERPL CALC-SCNC: 4 MMOL/L (ref 8–16)
AST SERPL-CCNC: 20 U/L (ref 10–40)
BASOPHILS # BLD AUTO: 0.02 K/UL (ref 0–0.2)
BASOPHILS NFR BLD: 0.2 % (ref 0–1.9)
BILIRUB SERPL-MCNC: 0.1 MG/DL (ref 0.1–1)
BUN SERPL-MCNC: 5 MG/DL (ref 6–20)
CALCIUM SERPL-MCNC: 7.3 MG/DL (ref 8.7–10.5)
CHLORIDE SERPL-SCNC: 121 MMOL/L (ref 95–110)
CO2 SERPL-SCNC: 23 MMOL/L (ref 23–29)
CREAT SERPL-MCNC: 0.5 MG/DL (ref 0.5–1.4)
DELSYS: ABNORMAL
DIFFERENTIAL METHOD: ABNORMAL
EOSINOPHIL # BLD AUTO: 0.2 K/UL (ref 0–0.5)
EOSINOPHIL NFR BLD: 1.8 % (ref 0–8)
ERYTHROCYTE [DISTWIDTH] IN BLOOD BY AUTOMATED COUNT: 18.3 % (ref 11.5–14.5)
EST. GFR  (AFRICAN AMERICAN): >60 ML/MIN/1.73 M^2
EST. GFR  (NON AFRICAN AMERICAN): >60 ML/MIN/1.73 M^2
GLUCOSE SERPL-MCNC: 98 MG/DL (ref 70–110)
HCO3 UR-SCNC: 21.2 MMOL/L (ref 24–28)
HCT VFR BLD AUTO: 27.6 % (ref 40–54)
HGB BLD-MCNC: 8.3 G/DL (ref 14–18)
IMM GRANULOCYTES # BLD AUTO: 0.09 K/UL (ref 0–0.04)
IMM GRANULOCYTES NFR BLD AUTO: 0.9 % (ref 0–0.5)
LYMPHOCYTES # BLD AUTO: 2 K/UL (ref 1–4.8)
LYMPHOCYTES NFR BLD: 21.3 % (ref 18–48)
MAGNESIUM SERPL-MCNC: 1.7 MG/DL (ref 1.6–2.6)
MCH RBC QN AUTO: 28.6 PG (ref 27–31)
MCHC RBC AUTO-ENTMCNC: 30.1 G/DL (ref 32–36)
MCV RBC AUTO: 95 FL (ref 82–98)
MODE: ABNORMAL
MONOCYTES # BLD AUTO: 0.9 K/UL (ref 0.3–1)
MONOCYTES NFR BLD: 9.9 % (ref 4–15)
NEUTROPHILS # BLD AUTO: 6.3 K/UL (ref 1.8–7.7)
NEUTROPHILS NFR BLD: 65.9 % (ref 38–73)
NRBC BLD-RTO: 0 /100 WBC
PCO2 BLDA: 31.5 MMHG (ref 35–45)
PH SMN: 7.43 [PH] (ref 7.35–7.45)
PHOSPHATE SERPL-MCNC: 1.4 MG/DL (ref 2.7–4.5)
PLATELET # BLD AUTO: 394 K/UL (ref 150–350)
PMV BLD AUTO: 10 FL (ref 9.2–12.9)
PO2 BLDA: 101 MMHG (ref 80–100)
POC BE: -3 MMOL/L
POC SATURATED O2: 98 % (ref 95–100)
POC TCO2: 22 MMOL/L (ref 23–27)
POCT GLUCOSE: 113 MG/DL (ref 70–110)
POCT GLUCOSE: 83 MG/DL (ref 70–110)
POCT GLUCOSE: 88 MG/DL (ref 70–110)
POCT GLUCOSE: 96 MG/DL (ref 70–110)
POCT GLUCOSE: 98 MG/DL (ref 70–110)
POTASSIUM SERPL-SCNC: 3 MMOL/L (ref 3.5–5.1)
PROCALCITONIN SERPL IA-MCNC: 0.3 NG/ML
PROT SERPL-MCNC: 5.5 G/DL (ref 6–8.4)
RBC # BLD AUTO: 2.9 M/UL (ref 4.6–6.2)
SAMPLE: ABNORMAL
SITE: ABNORMAL
SODIUM SERPL-SCNC: 148 MMOL/L (ref 136–145)
WBC # BLD AUTO: 9.51 K/UL (ref 3.9–12.7)

## 2020-08-30 PROCEDURE — 83735 ASSAY OF MAGNESIUM: CPT

## 2020-08-30 PROCEDURE — 36415 COLL VENOUS BLD VENIPUNCTURE: CPT

## 2020-08-30 PROCEDURE — 85025 COMPLETE CBC W/AUTO DIFF WBC: CPT

## 2020-08-30 PROCEDURE — 99900035 HC TECH TIME PER 15 MIN (STAT)

## 2020-08-30 PROCEDURE — A4216 STERILE WATER/SALINE, 10 ML: HCPCS | Performed by: NURSE PRACTITIONER

## 2020-08-30 PROCEDURE — 25000003 PHARM REV CODE 250: Performed by: PHYSICIAN ASSISTANT

## 2020-08-30 PROCEDURE — 25000242 PHARM REV CODE 250 ALT 637 W/ HCPCS: Performed by: NURSE PRACTITIONER

## 2020-08-30 PROCEDURE — 25000003 PHARM REV CODE 250: Performed by: NURSE PRACTITIONER

## 2020-08-30 PROCEDURE — 84145 PROCALCITONIN (PCT): CPT

## 2020-08-30 PROCEDURE — 25000003 PHARM REV CODE 250: Performed by: INTERNAL MEDICINE

## 2020-08-30 PROCEDURE — 25000003 PHARM REV CODE 250: Performed by: PSYCHIATRY & NEUROLOGY

## 2020-08-30 PROCEDURE — 94761 N-INVAS EAR/PLS OXIMETRY MLT: CPT

## 2020-08-30 PROCEDURE — 25000242 PHARM REV CODE 250 ALT 637 W/ HCPCS: Performed by: INTERNAL MEDICINE

## 2020-08-30 PROCEDURE — 20000000 HC ICU ROOM

## 2020-08-30 PROCEDURE — 80053 COMPREHEN METABOLIC PANEL: CPT

## 2020-08-30 PROCEDURE — 63600175 PHARM REV CODE 636 W HCPCS: Performed by: PHYSICIAN ASSISTANT

## 2020-08-30 PROCEDURE — 63600175 PHARM REV CODE 636 W HCPCS: Performed by: INTERNAL MEDICINE

## 2020-08-30 PROCEDURE — 99291 CRITICAL CARE FIRST HOUR: CPT | Mod: ,,, | Performed by: PSYCHIATRY & NEUROLOGY

## 2020-08-30 PROCEDURE — 84100 ASSAY OF PHOSPHORUS: CPT

## 2020-08-30 PROCEDURE — 93010 ELECTROCARDIOGRAM REPORT: CPT | Mod: ,,, | Performed by: INTERNAL MEDICINE

## 2020-08-30 PROCEDURE — 93005 ELECTROCARDIOGRAM TRACING: CPT

## 2020-08-30 PROCEDURE — 36600 WITHDRAWAL OF ARTERIAL BLOOD: CPT

## 2020-08-30 PROCEDURE — 27000221 HC OXYGEN, UP TO 24 HOURS

## 2020-08-30 PROCEDURE — 94640 AIRWAY INHALATION TREATMENT: CPT

## 2020-08-30 PROCEDURE — 99291 PR CRITICAL CARE, E/M 30-74 MINUTES: ICD-10-PCS | Mod: ,,, | Performed by: PSYCHIATRY & NEUROLOGY

## 2020-08-30 PROCEDURE — 82803 BLOOD GASES ANY COMBINATION: CPT

## 2020-08-30 PROCEDURE — 93010 EKG 12-LEAD: ICD-10-PCS | Mod: ,,, | Performed by: INTERNAL MEDICINE

## 2020-08-30 PROCEDURE — 63600175 PHARM REV CODE 636 W HCPCS: Performed by: NURSE PRACTITIONER

## 2020-08-30 PROCEDURE — 99233 PR SUBSEQUENT HOSPITAL CARE,LEVL III: ICD-10-PCS | Mod: ,,, | Performed by: INTERNAL MEDICINE

## 2020-08-30 PROCEDURE — 99233 SBSQ HOSP IP/OBS HIGH 50: CPT | Mod: ,,, | Performed by: INTERNAL MEDICINE

## 2020-08-30 RX ORDER — DEXAMETHASONE SODIUM PHOSPHATE 4 MG/ML
4 INJECTION, SOLUTION INTRA-ARTICULAR; INTRALESIONAL; INTRAMUSCULAR; INTRAVENOUS; SOFT TISSUE ONCE
Status: COMPLETED | OUTPATIENT
Start: 2020-08-30 | End: 2020-08-30

## 2020-08-30 RX ORDER — FUROSEMIDE 10 MG/ML
40 INJECTION INTRAMUSCULAR; INTRAVENOUS ONCE
Status: COMPLETED | OUTPATIENT
Start: 2020-08-30 | End: 2020-08-30

## 2020-08-30 RX ORDER — DEXAMETHASONE SODIUM PHOSPHATE 100 MG/10ML
10 INJECTION INTRAMUSCULAR; INTRAVENOUS ONCE
Status: DISCONTINUED | OUTPATIENT
Start: 2020-08-30 | End: 2020-08-30

## 2020-08-30 RX ADMIN — HEPARIN SODIUM 5000 UNITS: 5000 INJECTION INTRAVENOUS; SUBCUTANEOUS at 09:08

## 2020-08-30 RX ADMIN — PHENOBARBITAL 100 MG: 20 ELIXIR ORAL at 08:08

## 2020-08-30 RX ADMIN — SODIUM CHLORIDE 500 ML: 0.9 INJECTION, SOLUTION INTRAVENOUS at 05:08

## 2020-08-30 RX ADMIN — PROPRANOLOL HYDROCHLORIDE 20 MG: 10 TABLET ORAL at 03:08

## 2020-08-30 RX ADMIN — HEPARIN SODIUM 5000 UNITS: 5000 INJECTION INTRAVENOUS; SUBCUTANEOUS at 08:08

## 2020-08-30 RX ADMIN — DEXAMETHASONE SODIUM PHOSPHATE 4 MG: 4 INJECTION, SOLUTION INTRAMUSCULAR; INTRAVENOUS at 12:08

## 2020-08-30 RX ADMIN — BACITRACIN: 500 OINTMENT TOPICAL at 09:08

## 2020-08-30 RX ADMIN — CASTOR OIL AND BALSAM, PERU: 788; 87 OINTMENT TOPICAL at 08:08

## 2020-08-30 RX ADMIN — ACETAMINOPHEN 650 MG: 325 TABLET ORAL at 05:08

## 2020-08-30 RX ADMIN — CIPROFLOXACIN 400 MG: 2 INJECTION, SOLUTION INTRAVENOUS at 07:08

## 2020-08-30 RX ADMIN — PROPRANOLOL HYDROCHLORIDE 20 MG: 10 TABLET ORAL at 08:08

## 2020-08-30 RX ADMIN — BACLOFEN 10 MG: 10 TABLET ORAL at 03:08

## 2020-08-30 RX ADMIN — MICONAZOLE NITRATE: 20 OINTMENT TOPICAL at 09:08

## 2020-08-30 RX ADMIN — RACEPINEPHRINE HYDROCHLORIDE 0.5 ML: 11.25 SOLUTION RESPIRATORY (INHALATION) at 10:08

## 2020-08-30 RX ADMIN — POTASSIUM PHOSPHATE, MONOBASIC AND POTASSIUM PHOSPHATE, DIBASIC 30 MMOL: 224; 236 INJECTION, SOLUTION, CONCENTRATE INTRAVENOUS at 09:08

## 2020-08-30 RX ADMIN — PROPRANOLOL HYDROCHLORIDE 20 MG: 10 TABLET ORAL at 09:08

## 2020-08-30 RX ADMIN — CIPROFLOXACIN 400 MG: 2 INJECTION, SOLUTION INTRAVENOUS at 03:08

## 2020-08-30 RX ADMIN — BACLOFEN 10 MG: 10 TABLET ORAL at 08:08

## 2020-08-30 RX ADMIN — FUROSEMIDE 40 MG: 10 INJECTION, SOLUTION INTRAMUSCULAR; INTRAVENOUS at 11:08

## 2020-08-30 RX ADMIN — SODIUM CHLORIDE 10 ML: 9 INJECTION, SOLUTION INTRAMUSCULAR; INTRAVENOUS; SUBCUTANEOUS at 10:08

## 2020-08-30 RX ADMIN — CIPROFLOXACIN 400 MG: 2 INJECTION, SOLUTION INTRAVENOUS at 12:08

## 2020-08-30 RX ADMIN — Medication 1 CAPSULE: at 09:08

## 2020-08-30 RX ADMIN — ALBUTEROL SULFATE 2.5 MG: 2.5 SOLUTION RESPIRATORY (INHALATION) at 10:08

## 2020-08-30 NOTE — NURSING TRANSFER
Nursing Transfer Note      8/29/2020     Transfer To: 927    Transfer via bed    Transfer with O2, cardiac monitoring    Transported by RN and PCT    Medicines sent: yes and home meds    Chart send with patient: Yes    Notified: mother at bedside      Patient reassessed at: 8/29 2115    Upon arrival to floor: patient oriented to room, call bell in reach and bed in lowest position    Bedside neuro exam completed with receiving nurse.

## 2020-08-30 NOTE — ASSESSMENT & PLAN NOTE
· Resolved.   · Patient intubated upon transfer from outside facility. Patient found to have pneumonia and felt cause of respiratory failure. Respiratory failure has now resolved as patient extubated on 8/27 and now on just room air with oxygen sats > 90%.

## 2020-08-30 NOTE — ASSESSMENT & PLAN NOTE
· Improved.  · Patient with decreased mentation from baseline on admit. [place] [facility] [place] evaluation. EEG done and showed no status epilepticus and encephalopathy felt related to sepsis/infection.   · Encephalopathy improving and mother states patient is getting close to his cognitive baseline.   · Patient is non-verbal at baseline but is usually awake and able to interact with family at baseline.

## 2020-08-30 NOTE — CARE UPDATE
"RAPID RESPONSE NURSE NOTE     Admit Date: 2020  LOS: 4  Code Status: Full Code   Date of Consult: 2020  : 1995  Age: 24 y.o.  Weight:   Wt Readings from Last 1 Encounters:   20 33.1 kg (73 lb)     Sex: male  Race: White   Bed: [address] [place] [address] A:   MRN: [id number]  Time Rapid Response Team page Received: 0953   Time Rapid Response Team at Bedside: 1002  Time Rapid Response Team left Bedside: 1208  Was the patient discharged from an ICU this admission?   yes  Was the patient discharged from a PACU within last 24 hours?  no  Did the patient receive conscious sedation/general anesthesia within last 24 hours?  no  Was the patient in the ED within the past 24 hours?  no  Was the patient started on NIPPV within the past 24 hours?  no  Did this progress into an ARC or CPA:  no  Attending Physician: Nohemi Farris MD  Primary Service: [facility]  Consult Requested By: Nohemi Farris MD     SITUATION     Notified by charge RN via phone call.  Reason for alert: Abnormal Breathing   Called to evaluate the patient for Respiratory    BACKGROUND     Why is the patient in the hospital?: Encephalopathy acute    Patient has a past medical history of Acid reflux, Cerebral palsy, History of hip surgery, S/P percutaneous endoscopic gastrostomy (PEG) tube placement, and Seizures.    ASSESSMENT/INTERVENTIONS     /88   Pulse 105   Temp 97.1 °F (36.2 °C) (Axillary)   Resp 20   Ht 4' 9" (1.448 m)   Wt 33.1 kg (73 lb)   SpO2 (!) 94%   BMI 15.80 kg/m²     What did you find: Patient in bed, tachypnic on Albuterol treatment. Racemic epinephrine given for stridor. Care elevated to primary physician Brenton. Inhaled steroids ordered. STAT ABG ordered. Results WDL. Hemodynamically stable on RA with excessive inspiratory stridor.      RECOMMENDATIONS     We recommend: Continue to monitor for acute respiratory distress. Maintain SpO2 goal.     FOLLOW-UP/CONTINGENCY PLAN     Call the Rapid Response Nurse, " Narayan Dukes RN at x [phone] for additional questions or concerns.    PHYSICIAN ESCALATION     Orders received and case discussed with Dr. Farris .    Disposition: Transfer to [facility] 9074.

## 2020-08-30 NOTE — PLAN OF CARE
Problem: Adult Inpatient Plan of Care  Goal: Plan of Care Review  Outcome: Ongoing, Progressing     Problem: Skin and Tissue Injury (Mechanical Ventilation, Invasive)  Goal: Absence of Device-Related Skin and Tissue Injury  Outcome: Ongoing, Progressing     Problem: Fall Injury Risk  Goal: Absence of Fall and Fall-Related Injury  Outcome: Ongoing, Progressing     Patient is disoriented x4 and nonverbal. POC reviewed with patient and mother. Mother verbalized understanding. Patient's breathing is unlabored with equal chest expansion. Patient is receiving 5L of O2 via facemask. Patient has healed incisions on hips. Patient has multiple skin breakdowns on sacral and perineum area. Patient has a PEG tube to the LUQ;Peptamen 1.5 infusing at 40ml/hr. Patient has lower extremity deficits and contractures. Patient voids per condom cath and incontinence pads. Patient remained free from falls.  Bed in lowest position,bed alarm on, side rails up x3, no complaints or signs of distress. WCTM.  See flowsheets for full assessment and VS info.

## 2020-08-30 NOTE — SUBJECTIVE & OBJECTIVE
Interval History: Patient stepped down from Neuro ICU on evening of 8/29 [facility] medicine. Overnight, rapid response called due to elevated temperature of 102 F that improved with Tylenol. This am patient had a second rapid response after patient developed stridor and acute respiratory distress and labored breathing. Mother at bedside noted ever since patient was extubated on 8/27 patient has been having this upper airway noise but much worse today and breathing much worse this am. Rapid response team and myself and Neuro ICU team came to bedside. Patient given Albuterol nebulized treatment and racemic Epinephrine and stridor improved and breathing improved and not as labored after nebulizers. ABG done that showed 7.45/31/101/98% on room air. CXR done and shows no infiltrates or pulmonary edema or effusions. Patient ordered to get nebulized Decadron to help with stridor. Dr. Hitchcock came to bedside and patient was sat up further in bed and stridor improved. When his chin was lifted stridor resolved so appears to be a mechanical airway issue causing stridor likely from muscle weakness. For now plan to closely monitor on floor but if breathing to be become more labored or stridor were to worsen plan is to transfer patient back to Neuro ICU. When I lefty bedside after end of assessment patient was breathing much more comfortably and stridor less pronounced. Mother was reassured we are closely monitoring situation and her questions were answered. Mother reports patient has not slept in 3 days since extubation and very tired today so not as alert as usual. Patient is non-verbal at baseline as per mother.     Review of Systems   Unable to perform ROS: Patient nonverbal     Objective:     Vital Signs (Most Recent):  Temp: 97.1 °F (36.2 °C) (08/30/20 0900)  Pulse: 94 (08/30/20 1152)  Resp: 21 (08/30/20 1152)  BP: 134/85 (08/30/20 1152)  SpO2: 94 % (08/30/20 1152) on room air Vital Signs (24h Range):  Temp:  [97.1 °F  (36.2 °C)-102 °F (38.9 °C)] 97.1 °F (36.2 °C)  Pulse:  [] 94  Resp:  [18-30] 21  SpO2:  [94 %-99 %] 94 %  BP: (105-168)/(57-94) 134/85     Weight: 33.1 kg (73 lb)  Body mass index is 15.8 kg/m².    Intake/Output Summary (Last 24 hours) at 8/30/2020 1208  Last data filed at 8/30/2020 0624  Gross per 24 hour   Intake 1290 ml   Output 685 ml   Net 605 ml      Physical Exam  Vitals signs and nursing note reviewed.   Constitutional:       General: He is in acute distress (Mild).      Appearance: He is cachectic.   Eyes:      Conjunctiva/sclera: Conjunctivae normal.   Neck:      Trachea: No abnormal tracheal secretions.   Cardiovascular:      Rate and Rhythm: Normal rate and regular rhythm.      Heart sounds: Normal heart sounds. No murmur. No gallop.    Pulmonary:      Effort: Tachypnea and accessory muscle usage (Mild) present.      Breath sounds: Normal breath sounds. Stridor (Inspiratory and expiratory) present. No wheezing or rales.   Abdominal:      General: Abdomen is flat. Bowel sounds are normal. There is no distension.      Palpations: Abdomen is soft.      Comments: PEG tube in place   Neurological:      Mental Status: He is easily aroused. He is lethargic.      Comments: Contractures to all extremities noted         Significant Labs:   CBC:   Recent Labs   Lab 08/29/20 0113 08/30/20  0638   WBC 14.02* 9.51   HGB 9.9* 8.3*   HCT 32.7* 27.6*    394*     CMP:   Recent Labs   Lab 08/29/20 0113 08/30/20  0638   * 148*   K 3.6 3.0*   * 121*   CO2 16* 23    98   BUN 10 5*   CREATININE 0.7 0.5   CALCIUM 7.4* 7.3*   PROT 5.6* 5.5*   ALBUMIN 1.6* 1.6*   BILITOT 0.2 0.1   ALKPHOS 87 79   AST 19 20   ALT 19 18   ANIONGAP 6* 4*   EGFRNONAA >60.0 >60.0     Magnesium:   Recent Labs   Lab 08/29/20 0113 08/30/20  0638   MG 2.0 1.7     Phosphorus 1.4  Recent Labs     08/30/20  1053   PH 7.435   PCO2 31.5*   PO2 101*   HCO3 21.2*   BE -3   POCSATURATED 98     Lab Results   Component Value Date     PROCAL 0.30 (H) 08/30/2020     Significant Imaging: I have reviewed all pertinent imaging results/findings within the past 24 hours.

## 2020-08-30 NOTE — ASSESSMENT & PLAN NOTE
Deep tissue injury  Wounds present on admit an wound care consulted and evaluated patient on 8/28.   As per wound care recs:  Recommendations:  · Turn every 2 hrs. Pad bony prominences with pillows or foam dressings.  · Deep tissue pressure injuries to bilateral ischium and skin breakdown to scrotum: Venelex ointment BID/prn.  · Continue to monitor deep tissue pressure injuries as they continue to demarcate, full thickness skin loss currently noted to left ischium, both ischial pressure injuries may demarcate to further full thickness skin loss.  · Upper sacrum/buttocks: BID/prn Apply criticaid clear barrier cream with Miconazole.  · Right cheek: weekly clean with sterile normal saline, dress with foam dressing.   · Nursing to continue care. Wound care to follow prn.   · Right ischium: Dark purple discoloration with redden periwound, boggy to palpation 5cm x 5 cm.

## 2020-08-30 NOTE — CARE UPDATE
Rapid Response Nurse Chart Check     Chart check completed, abnormal VS noted. Bedside RN, Radha contacted. No concerns verbalized at this time. Instructed to call [phone] for further concerns or assistance.

## 2020-08-30 NOTE — NURSING
Upon assessment pt appears to be in respiratory distress  Breathing labored with audible stridor  VS stable; O2 sats 97%  Respiratory called for PRN treatment  Oral care & suction performed; sats maintained but did not change pt.'s respiratory status   Blood Glucose 96  Team K paged & OC called Rapid Nurses to round on pt   Dr. Farris at bedside; orders received & followed   See MAR & flow sheets for specifics   VS remain stable        Pt transferred to the Neuro ICU room 9095; report given.  RR team transferred pt on Dash Monitor in bed; mother at bedside

## 2020-08-30 NOTE — NURSING
Spoke with FRANK around 0458am about patient's axillary temp of 102 and an apical hr of 120, also told physician that patient was a bit diaphoretic. Told physician that tylenol will be given right after call. Fluids and stat EKG ordered. WCTM.

## 2020-08-30 NOTE — ASSESSMENT & PLAN NOTE
· Patient developed hypernatremia in ICU related to normal saline infusion and free water depletion. Normal saline infusion stopped on am of 8/29 when sodium got up to 150.   · Patient started on free water flushes via  every 6 hourson 8/29 and sodium is improving and down to 148 on 8/30.   · Monitor with daily BMP.

## 2020-08-30 NOTE — ASSESSMENT & PLAN NOTE
Patient noted to have worsening stridor this morning and some respiratory distress   - Received albuterol, racemic epi, steroids via inhalation   - Maintaining O2 sats >92%   - Stridor noted to improve with jaw thrust maneuver -> likely mechanical obstruction combined with inability to effectively clear secretions   - Will trial face mask O2 and can attempt soft C collar for jaw thrust support

## 2020-08-30 NOTE — CARE UPDATE
Rapid Response Nurse Chart Check     Chart check completed, abnormal VS noted. Please call [phone] for further concerns or assistance.

## 2020-08-30 NOTE — ASSESSMENT & PLAN NOTE
Chronic and controlled. EEG done on this admit showed no status epilepticus. Continue Phenobarbital 100 mg nightly via G tube to treat as patient takes at home

## 2020-08-30 NOTE — PROGRESS NOTES
[facility] Hospital Medicine  Progress Note    Patient Name: Glen Moscoso  MRN: [id number]  Patient Class: IP- Inpatient   Admission Date: 8/26/2020  Length of Stay: 4 days  Attending Physician: Carlos Hitchcock MD  Primary Care Provider: Yadi Lawson MD    [facility] Medicine Team: [facility] HOSP MED K Nohemi Farris MD    Subjective:     Principal Problem:Acute respiratory failure with hypoxia        HPI:  23 yo M with cerebral palsy, Seizures, and recent admission for hyponatremia who presents to [facility] for encephalopathy, sepsis, diarrhea, and possible seizure. He presented to OSH after being found unresponsive and in resp distress. He was found down by his mother upon EMS arrival he had agonal resp 3-4 per min, he was intubated by EMS and take to OSH ED. He was hypotensive on arrival started on Levo gtt, received antibiotics, ABG showed metabolic acidosis. He is being admitted to [facility] for a higher level of care, EGG and close monitoring.   Recently admitted [facility] from August 4-August 18 with hypokalemia, colitis, left buttock decubitus ulcer, fever of uncertain etiology, aspiration pneumonitis, leukocytosis, diarrhea, tachypnea, sinus tachycardia.  Serum bicarbonate August 11-18 ranged 13-17.  Multiple cultures to include respiratory cultures, blood cultures, and stool cultures (including C diff) did not have acute abnormalities.  COVID on August 9 was negative.  He was treated with p.o. vancomycin along with Levaquin and Flagyl.    Overview/Hospital Course:  Patient admitted to Neuro ICU and started on broad spectrum antibiotics (Vancomcyin and Zosyn) for his leukocytosis and fevers and elevated procalcitonin of 6. Patient was pancultured with blood cultures showing NGTD, urine cx NGTD and C diff negative. Respiratory cultures grew Serratia marcescens and Pseudomonas aeruginosa. After respiratory cultures returned Vancomycin discontinued and patient continued on IV Zosyn. Patient was  extubated on 8/27 and able to be weaned to room air while in Neuro ICU. At baseline he is nonverbal and minimally interactive. Will not follow commands. EEG was done on 8/27 which showed diffuse disorganized slowing of the background which was very nonspecific. No seizures recorded in this study. Patient restarted on home TF via PEG after extubation and free water flushes via PEG for hypernatremia noted on 8/29 of 150 with improvement. Patient was stepped down from Neuro ICU on evening of 8/29. After stepdown on evening of 8/29 patient with temperature of 102 F. Rapid response called but no new orders given. Prior to stepdown from Neuo ICU patient switched from IV Zosyn to Cipro after sensitivities returned from respiratory cultures. Patient on 8/30 had another rapid response called as patient developed stridor and labored breathing. Patient given Albuterol nebulizer treatment and nebulized racemic Epinephrine with improvement. Dr. Hitchcock and Neuro ICU team came to bedside and patient did improve after racemic Epi. Patient had ABG done that showed 7.45/31/101/98%. Stridor felt to be mechanical in nature as improved when patient sat up and when his chin was pulled up. Patient to be observed on floor at this time.       Interval History: Patient stepped down from Neuro ICU on evening of 8/29 [facility] [place] [facility]. Overnight, rapid response called due to elevated temperature of 102 F that improved with Tylenol. This am patient had a second rapid response after patient developed stridor and acute respiratory distress and labored breathing. Mother at bedside noted ever since patient was extubated on 8/27 patient has been having this upper airway noise but much worse today and breathing much worse this am. Rapid response team and myself and Neuro ICU team came to bedside. Patient given Albuterol nebulized treatment and racemic Epinephrine and stridor improved and breathing improved and not as labored after nebulizers.  ABG done that showed 7.45/31/101/98% on room air. CXR done and shows no infiltrates or pulmonary edema or effusions. Patient ordered to get nebulized Decadron to help with stridor. Dr. Hitchcock came to bedside and patient was sat up further in bed and stridor improved. When his chin was lifted stridor resolved so appears to be a mechanical airway issue causing stridor likely from muscle weakness. For now plan to closely monitor on floor but if breathing to be become more labored or stridor were to worsen plan is to transfer patient back to Neuro ICU. When I lefty bedside after end of assessment patient was breathing much more comfortably and stridor less pronounced. Mother was reassured we are closely monitoring situation and her questions were answered. Mother reports patient has not slept in 3 days since extubation and very tired today so not as alert as usual. Patient is non-verbal at baseline as per mother.     Review of Systems   Unable to perform ROS: Patient nonverbal     Objective:     Vital Signs (Most Recent):  Temp: 97.1 °F (36.2 °C) (08/30/20 0900)  Pulse: 94 (08/30/20 1152)  Resp: 21 (08/30/20 1152)  BP: 134/85 (08/30/20 1152)  SpO2: 94 % (08/30/20 1152) on room air Vital Signs (24h Range):  Temp:  [97.1 °F (36.2 °C)-102 °F (38.9 °C)] 97.1 °F (36.2 °C)  Pulse:  [] 94  Resp:  [18-30] 21  SpO2:  [94 %-99 %] 94 %  BP: (105-168)/(57-94) 134/85     Weight: 33.1 kg (73 lb)  Body mass index is 15.8 kg/m².    Intake/Output Summary (Last 24 hours) at 8/30/2020 1208  Last data filed at 8/30/2020 0624  Gross per 24 hour   Intake 1290 ml   Output 685 ml   Net 605 ml      Physical Exam  Vitals signs and nursing note reviewed.   Constitutional:       General: He is in acute distress (Mild).      Appearance: He is cachectic.   Eyes:      Conjunctiva/sclera: Conjunctivae normal.   Neck:      Trachea: No abnormal tracheal secretions.   Cardiovascular:      Rate and Rhythm: Normal rate and regular rhythm.       Heart sounds: Normal heart sounds. No murmur. No gallop.    Pulmonary:      Effort: Tachypnea and accessory muscle usage (Mild) present.      Breath sounds: Normal breath sounds. Stridor (Inspiratory and expiratory) present. No wheezing or rales.   Abdominal:      General: Abdomen is flat. Bowel sounds are normal. There is no distension.      Palpations: Abdomen is soft.      Comments: PEG tube in place   Neurological:      Mental Status: He is easily aroused. He is lethargic.      Comments: Contractures to all extremities noted         Significant Labs:   CBC:   Recent Labs   Lab 08/29/20  0113 08/30/20  0638   WBC 14.02* 9.51   HGB 9.9* 8.3*   HCT 32.7* 27.6*    394*     CMP:   Recent Labs   Lab 08/29/20 0113 08/30/20  0638   * 148*   K 3.6 3.0*   * 121*   CO2 16* 23    98   BUN 10 5*   CREATININE 0.7 0.5   CALCIUM 7.4* 7.3*   PROT 5.6* 5.5*   ALBUMIN 1.6* 1.6*   BILITOT 0.2 0.1   ALKPHOS 87 79   AST 19 20   ALT 19 18   ANIONGAP 6* 4*   EGFRNONAA >60.0 >60.0     Magnesium:   Recent Labs   Lab 08/29/20  0113 08/30/20  0638   MG 2.0 1.7     Phosphorus 1.4  Recent Labs     08/30/20  1053   PH 7.435   PCO2 31.5*   PO2 101*   HCO3 21.2*   BE -3   POCSATURATED 98     Lab Results   Component Value Date    PROCAL 0.30 (H) 08/30/2020     Significant Imaging: I have reviewed all pertinent imaging results/findings within the past 24 hours.      Assessment/Plan:      * Acute respiratory failure with hypoxia  · Resolved.   · Patient intubated upon transfer from outside facility. Patient found to have pneumonia and felt cause of respiratory failure. Respiratory failure has now resolved as patient extubated on 8/27 and now on just room air with oxygen sats > 90%.       Biphasic stridor  · Patient with acute development of stridor after extubation of 8/27 but acutely worsened on 8/30.   · Likely mechanical due to neck muscle weakness related to his advance cerebral palsy as improved when patient was sat  up and chin was pulled up) and recent acute illnesses but also likely a degree of airway inflammation as contributing to upper air obstruction.   · Will give Decadron 10 mg nebulizer x 1 treatment to see if helps.  · Patient should be at 90 degree angle at all times to help with breathing mechanics and will monitor.  · Patient develops any further acute worsening or labored breathing plan is for patient to transfer back to Neuro ICU.     Pneumonia due to Pseudomonas aerginuosa and Serratia marcesens  · Improving.   · Patient admitted with acute hypoxic respiratory failure and sepsis. Blood and urine cultures negative and C. Diff negative but respiratory culture grew Serratia and Pseudomonas. Procal improved since initiation of antibiotics and WBC normalized and sepsis resolved.   · Plan to continue IV Cipro to treat Pseudomonas and Serratia pneumonia an plan 7 day course to treat.  · CXR on 8/30 shows no acute lung abnormalities.  · Patient now on room air and breathing on his own.       Encephalopathy acute  · Improved.  · Patient with decreased mentation from baseline on admit. Patient transferred to [facility] Neuro ICU for further evaluation. EEG done and showed no status epilepticus and encephalopathy felt related to sepsis/infection.   · Encephalopathy improving and mother states patient is getting close to his cognitive baseline.   · Patient is non-verbal at baseline but is usually awake and able to interact with family at baseline.       Decubitus ulcer of left buttock, stage 2  Deep tissue injury  Wounds present on admit an wound care consulted and evaluated patient on 8/28.   As per wound care recs:  Recommendations:  · Turn every 2 hrs. Pad bony prominences with pillows or foam dressings.  · Deep tissue pressure injuries to bilateral ischium and skin breakdown to scrotum: Venelex ointment BID/prn.  · Continue to monitor deep tissue pressure injuries as they continue to demarcate, full thickness skin loss  currently noted to left ischium, both ischial pressure injuries may demarcate to further full thickness skin loss.  · Upper sacrum/buttocks: BID/prn Apply criticaid clear barrier cream with Miconazole.  · Right cheek: weekly clean with sterile normal saline, dress with foam dressing.   · Nursing to continue care. Wound care to follow prn.   · Right ischium: Dark purple discoloration with redden periwound, boggy to palpation 5cm x 5 cm.    Seizure disorder  Chronic and controlled. EEG done on this admit showed no status epilepticus. Continue Phenobarbital 100 mg nightly via G tube to treat as patient takes at home       Anemia of chronic disease  Chronic and controlled. Monitor with daily CBC and transfuse if Hgb < 7.       Hypokalemia  Will treat with Potassium Phosphate 30 mmol IV for 1 dose(s) today and recheck level in the am to monitor.      Hypernatremia  · Patient developed hypernatremia in ICU related to normal saline infusion and free water depletion. Normal saline infusion stopped on am of 8/29 when sodium got up to 150.   · Patient started on free water flushes via  every 6 hourson 8/29 and sodium is improving and down to 148 on 8/30.   · Monitor with daily BMP.       Hypophosphatemia  Will treat with Potassium Phosphate 30 mmol IV for 1 dose(s) today and recheck level in the am to monitor.      Severe protein-calorie malnutrition  · Patient with obvious muscle wasting and BMI of 15.8. Nutrition consulted on admit.   · Patient started on TF with Peptamen 1.5 with prebio at 40 cc/hr.       Cerebral palsy  Patient with poor functional baseline and bed bound with extremity contractures and total care at home.       VTE Risk Mitigation (From admission, onward)         Ordered     heparin (porcine) injection 5,000 Units  Every 12 hours      08/27/20 0935     Place sequential compression device  Until discontinued      08/26/20 0210     IP VTE LOW RISK PATIENT  Once      08/26/20 0210                 Discharge Planning   ROCÍO: 9/2/2020     Code Status: Full Code   Is the patient medically ready for discharge?: No    Reason for patient still in hospital (select all that apply): Patient unstable  Discharge Plan A: Home with family, Home Health            Nohemi Farris MD  Department of Hospital Medicine   [facility]

## 2020-08-30 NOTE — ASSESSMENT & PLAN NOTE
· Patient with obvious muscle wasting and BMI of 15.8. Nutrition consulted on admit.   · Patient started on TF with Peptamen 1.5 with prebio at 40 cc/hr.

## 2020-08-30 NOTE — PROGRESS NOTES
[facility]  Neurocritical Care  Progress Note    Admit Date: 8/26/2020  Service Date: 08/30/2020  Length of Stay: 4    Subjective:     Chief Complaint: Acute respiratory failure with hypoxia    History of Present Illness: Glen Moscoso is a 23 yo M with a PMH of CP, Seizures, and recent admission for hyponatremia who presents to [facility] for encephalopathy, sepsis, diarrhea, and possible seizure. He presented to OSH after being found unresponsive and in  Resp distress. He was found down by his mother upon EMS arrival he had agonal resp 3-4 per min, he was intubated by EMS and take to OSH ED. He was hypotensive on arrival started on Levo gtt, received antibiotics, ABG showed metabolic acidosis. He is being admitted to [facility] for a higher level of care, EGG and close monitoring.     Hospital Course: 8/26: Admit NCC: BC, antibiotics, EEG, Levo  08/27/2020 Extubated on 8/27/20. Tube feeds were started. Awaiting Urine culture. Patient had multiple episodes of diarrhea and had an extensive antibiotic history before admission, so will collect C diff. EEG does not show any epileptiform activity or discharge. Patient extubated and started on tube feeds.   08/28/2020 Procal was elevated. Infectious workup sent which showed his c diff, and urine culture being negative. His respiratory culture showing serratia marcesens as well as pseudomonas. Patient was already started on zosyn. Per mother he is back to his baseline and does not have any ICU needs.   8/29/2020: Procal back down, d/c'd NS with KCl d/t Na increase, started enteric water flushes 150cc q6h and one time bolus 250cc, d/c'd zosyn and narrowed to cipro for PNA    Interval History: Patient stepped down to NPU floor yesterday. This morning, was noted to be in some respiratory distress with inspiratory stridor noted, but maintained O2 sats. Given albuterol, racemic epi, and inhaled steroids. Remained hemodynamically stable throughout. Was stepped back up to  neuro ICU today for closer monitoring of respiratory status.    Review of Systems   Unable to perform ROS: Patient nonverbal     Objective:     Vitals:  Temp: 98.4 °F (36.9 °C)  Pulse: 87  Rhythm: normal sinus rhythm  BP: (!) 141/86  MAP (mmHg): 108  Resp: (!) 21  SpO2: 98 %  Oxygen Concentration (%): (S) 21  O2 Device (Oxygen Therapy): Aerosol Mask    Temp  Min: 97.1 °F (36.2 °C)  Max: 102 °F (38.9 °C)  Pulse  Min: 86  Max: 158  BP  Min: 105/57  Max: 168/80  MAP (mmHg)  Min: 73  Max: 112  Resp  Min: 17  Max: 30  SpO2  Min: 94 %  Max: 100 %  Oxygen Concentration (%)  Min: 21  Max: 21    08/29 0701 - 08/30 0700  In: 1980 [I.V.:40]  Out: 785 [Urine:725]   Unmeasured Output  Urine Occurrence: 1  Stool Occurrence: 1  Emesis Occurrence: 0  Pad Count: 2       Physical Exam  Vitals signs and nursing note reviewed.   Constitutional:       General: Distressed: Mild.      Appearance: He is cachectic.   Eyes:      Conjunctiva/sclera: Conjunctivae normal.   Neck:      Trachea: No abnormal tracheal secretions.   Cardiovascular:      Rate and Rhythm: Normal rate and regular rhythm.      Pulses: Normal pulses.      Heart sounds: Normal heart sounds.   Pulmonary:      Effort: Tachypnea present. Accessory muscle usage: Mild.      Breath sounds: Normal breath sounds. Stridor (inspiratory > expiratory) present. No wheezing or rales.   Abdominal:      General: Abdomen is flat. Bowel sounds are normal. There is no distension.      Palpations: Abdomen is soft.      Comments: PEG tube in place   Neurological:      Mental Status: He is lethargic.      Comments: Contractures to all extremities noted       Medications:    Scheduled  bacitracin, , Daily  baclofen, 10 mg, TID  balsam peru-castor oiL, , BID  ciprofloxacin, 400 mg, Q8H  heparin (porcine), 5,000 Units, Q12H  Lactobacillus rhamnosus GG, 1 capsule, Daily  miconazole nitrate 2%, , BID  PHENobarbitaL, 100 mg, QHS  propranoloL, 20 mg, TID    PRN  acetaminophen, 650 mg, Q4H  PRN  albuterol sulfate, 2.5 mg, Q6H PRN  ondansetron, 4 mg, Q8H PRN    Today I personally reviewed pertinent medications, lines/drains/airways, imaging, cardiology results, laboratory results, microbiology results,     Diet  Diet NPO    Assessment/Plan:     Neuro  Encephalopathy acute  - at baseline he is confused but able to interact and unable to walk  - found unresponsive    - EEG does not show any epileptiform activity   - Encephalopathy most likely due to a combination of dehydration and Pneumonia  -per pt mother, pt has returned to baseline neuro status    Seizure disorder  - EEG done at hospital does not show any electrographic seizures     Cerebral palsy  - is wheelchair bound and bed bound   - minimal interaction with surroundings   - Mother is care giver     Derm  Decubitus ulcer of left buttock, stage 2  - consult wound care for DTI     Pulmonary  Pneumonia due to Pseudomonas aerginuosa and Serratia marcesens  - WBC 25 OSH; 9.5 today  - BCx x2, UA negative   - Sent C diff due to multiple episodes of diarrhea which was negative   - Respiratory cultures are positive--> please see PNA for further plan and assessment      Renal/  Hypokalemia   - Chronic issue that is worsening with his diarrhea    - K 3.0 today   - Will replete K today    Endocrine  Severe protein-calorie malnutrition  - continue tube feeds    Orthopedic  Deep tissue injury  - R buttock   - Wound care consult     Other  Biphasic stridor  Patient noted to have worsening stridor this morning and some respiratory distress   - Received albuterol, racemic epi, steroids via inhalation   - Maintaining O2 sats >92%   - Stridor noted to improve with jaw thrust maneuver -> likely mechanical obstruction combined with inability to effectively clear secretions   - Will trial face mask O2 and can attempt soft C collar for jaw thrust support        The patient is being Prophylaxed for:  Venous Thromboembolism with: Chemical  Stress Ulcer with:  None  Ventilator Pneumonia with: not applicable    Activity Orders          Diet NPO: NPO starting at 08/26 0159        Full Code    Lane Murray MD, PGY-1  Neurocritical Care  [facility]

## 2020-08-30 NOTE — SUBJECTIVE & OBJECTIVE
Interval History: Patient stepped down to NPU floor yesterday. This morning, was noted to be in some respiratory distress with inspiratory stridor noted, but maintained O2 sats. Given albuterol, racemic epi, and inhaled steroids. Remained hemodynamically stable throughout. Was stepped back up to neuro ICU today for closer monitoring of respiratory status.    Review of Systems   Unable to perform ROS: Patient nonverbal     Objective:     Vitals:  Temp: 98.4 °F (36.9 °C)  Pulse: 87  Rhythm: normal sinus rhythm  BP: (!) 141/86  MAP (mmHg): 108  Resp: (!) 21  SpO2: 98 %  Oxygen Concentration (%): (S) 21  O2 Device (Oxygen Therapy): Aerosol Mask    Temp  Min: 97.1 °F (36.2 °C)  Max: 102 °F (38.9 °C)  Pulse  Min: 86  Max: 158  BP  Min: 105/57  Max: 168/80  MAP (mmHg)  Min: 73  Max: 112  Resp  Min: 17  Max: 30  SpO2  Min: 94 %  Max: 100 %  Oxygen Concentration (%)  Min: 21  Max: 21    08/29 0701 - 08/30 0700  In: 1980 [I.V.:40]  Out: 785 [Urine:725]   Unmeasured Output  Urine Occurrence: 1  Stool Occurrence: 1  Emesis Occurrence: 0  Pad Count: 2       Physical Exam  Vitals signs and nursing note reviewed.   Constitutional:       General: Distressed: Mild.      Appearance: He is cachectic.   Eyes:      Conjunctiva/sclera: Conjunctivae normal.   Neck:      Trachea: No abnormal tracheal secretions.   Cardiovascular:      Rate and Rhythm: Normal rate and regular rhythm.      Pulses: Normal pulses.      Heart sounds: Normal heart sounds.   Pulmonary:      Effort: Tachypnea present. Accessory muscle usage: Mild.      Breath sounds: Normal breath sounds. Stridor (inspiratory > expiratory) present. No wheezing or rales.   Abdominal:      General: Abdomen is flat. Bowel sounds are normal. There is no distension.      Palpations: Abdomen is soft.      Comments: PEG tube in place   Neurological:      Mental Status: He is lethargic.      Comments: Contractures to all extremities noted       Medications:    Scheduled  bacitracin, ,  Daily  baclofen, 10 mg, TID  balsam peru-castor oiL, , BID  ciprofloxacin, 400 mg, Q8H  heparin (porcine), 5,000 Units, Q12H  Lactobacillus rhamnosus GG, 1 capsule, Daily  miconazole nitrate 2%, , BID  PHENobarbitaL, 100 mg, QHS  propranoloL, 20 mg, TID    PRN  acetaminophen, 650 mg, Q4H PRN  albuterol sulfate, 2.5 mg, Q6H PRN  ondansetron, 4 mg, Q8H PRN    Today I personally reviewed pertinent medications, lines/drains/airways, imaging, cardiology results, laboratory results, microbiology results,     Diet  Diet NPO

## 2020-08-30 NOTE — ASSESSMENT & PLAN NOTE
Patient with poor functional baseline and bed bound with extremity contractures and total care at home.

## 2020-08-30 NOTE — ASSESSMENT & PLAN NOTE
- WBC 25 OSH; 9.5 today  - BCx x2, UA negative   - Sent C diff due to multiple episodes of diarrhea which was negative   - Respiratory cultures are positive--> please see PNA for further plan and assessment

## 2020-08-30 NOTE — RESPIRATORY THERAPY
Rapid Response Respiratory Therapy Proactive Rounding Note      Time of visit: 1015    Code Status: Full Code   : 1995  Bed: 927/927 A:   MRN: [id number]    SITUATION     Evaluated patient for: STRIDOR    BACKGROUND     Patient has a past medical history of Acid reflux, Cerebral palsy, History of hip surgery, S/P percutaneous endoscopic gastrostomy (PEG) tube placement, and Seizures.    ASSESSMENT/INTERVENTIONS     Upon arrival in room pt had inspiratory and expiratory stridor. Albuterol, racemic, and decadron tx give. ABG performed     Pulse: 106 Respiratory rate: 22 Temperature: Temp: 97.1 °F (36.2 °C) BP: BP: 132/88 SpO2: 96  Level of Consciousness: Level of Consciousness (AVPU): responds to voice  Respiratory Effort: Respiratory Effort: Labored Expansion/Accessory Muscle Usage: Expansion/Accessory Muscles/Retractions: abdominal muscle use  All Lung Field Breath Sounds: All Lung Fields Breath Sounds: stridor, expiratory, stridor, inspiratory  ZHANG Breath Sounds: coarse  LLL Breath Sounds: coarse  RUL Breath Sounds: coarse  RML Breath Sounds: coarse  RLL Breath Sounds: coarse  O2 Device/Concentration: room air  Most recent blood gas:   Recent Labs     20  1053   PH 7.435   PCO2 31.5*   PO2 101*   HCO3 21.2*   POCSATURATED 98   BE -3     NIPPV: No Surgical airway: No  ETCO2 monitored:    Ambu at bedside: Ambu bag with the patient?: Yes, Adult Ambu    Current Respiratory Care Orders:     Pulse Oximetry Q4H Every 4 hours (4 of 21 released)    Release    20 1035   20 1105  RESPIRATORY COMMUNICATION Continuous     Comments: Please place on continuous humidified air    20 1105   Unscheduled  Oxygen PRN Use PRN (0 of 16794 released)    Release   References: Oxygen Titration Protocol   Question Answer Comment   Device type: Low flow    Device: Nasal Cannula (1- 5 Liters)    LPM: 4    Titrate O2 per Oxygen Titration Protocol: Yes    To maintain SpO2 goal of: >= 90%    Notify MD of:  Inability to achieve desired SpO2; Sudden change in patient status and requires 20% increase in FiO2; Patient requires >60% FiO2        08/27/20 1227   Unscheduled  Inhalation Treatment Q6H PRN Every 6 hours PRN (0 of 10810 released)    Release    08/28/20 2109      IP Meds - Nasal and Inhaled  Comment  Hide  (From admission, onward)     Last edited by  on  at    Start   Stop Status Route Frequency Ordered   08/28/20 2209  albuterol sulfate nebulizer solution 2.5 mg (albuterol sulfate nebulizer solution panel)    Discontinue    -- Dispensed NEBULIZATION Every 6 hours PRN 08/28/20 2142         RECOMMENDATIONS     We recommend: possibly transfer pt if stridor persist.    ESCALATION      Physician Escalation (Yes/No) yes Care discussed with: Brenton ESCOTO Discussed plan of care primary RT,Mini Blevins RT    FOLLOW-UP     Please call back the Rapid Response RT, Cristina Espinoza, RRT at x [phone] for any questions or concerns.

## 2020-08-30 NOTE — ASSESSMENT & PLAN NOTE
- is wheelchair bound and bed bound   - minimal interaction with surroundings   - Mother is care giver

## 2020-08-30 NOTE — ASSESSMENT & PLAN NOTE
· Improving.   · Patient admitted with acute hypoxic respiratory failure and sepsis. Blood and urine cultures negative and C. Diff negative but respiratory culture grew Serratia and Pseudomonas. Procal improved since initiation of antibiotics and WBC normalized and sepsis resolved.   · Plan to continue IV Cipro to treat Pseudomonas and Serratia pneumonia an plan 7 day course to treat.  · CXR on 8/30 shows no acute lung abnormalities.  · Patient now on room air and breathing on his own.

## 2020-08-30 NOTE — ASSESSMENT & PLAN NOTE
Will treat with Potassium Phosphate 30 mmol IV for 1 dose(s) today and recheck level in the am to monitor.

## 2020-08-30 NOTE — ASSESSMENT & PLAN NOTE
· Patient with acute development of stridor after extubation of 8/27 but acutely worsened on 8/30.   · Likely mechanical due to neck muscle weakness related to his advance cerebral palsy as improved when patient was sat up and chin was pulled up) and recent acute illnesses but also likely a degree of airway inflammation as contributing to upper air obstruction.   · Will give Decadron 10 mg nebulizer x 1 treatment to see if helps.  · Patient should be at 90 degree angle at all times to help with breathing mechanics and will monitor.  · Patient develops any further acute worsening or labored breathing plan is for patient to transfer back to Neuro ICU.

## 2020-08-30 NOTE — NURSING
Patient arrived to [facility] from NPU via bed with RNx2 and RRT x1    Type of stroke/diagnosis:  Seizures    Current symptoms: stridor    Skin assessment done: Yes    Wounds noted: L buttock DTI, scrotal skin tear, R cheek dressing    JUSTO Armband Applied: No, PEG dependent    Patient Belongings on Admit: two suitcases, gray elephant stuffed animal    NCC notified: AMARI Love with NCC

## 2020-08-30 NOTE — PLAN OF CARE
POC reviewed with pt and pt's mother, Leni, at 1400. Pt's mother verbalized understanding. Questions and concerns addressed. Pt was rapid from NPU this afternoon for stridor. Pt on humidified aerosol mask to loosen thick secretions.  Nasal trumpet in place.  Respiratory status vastly improved.  Straight cath x 2.  Pt progressing toward goals. Will continue to monitor. See flowsheets for full assessment and VS info.

## 2020-08-31 LAB
ALBUMIN SERPL BCP-MCNC: 1.8 G/DL (ref 3.5–5.2)
ALP SERPL-CCNC: 92 U/L (ref 55–135)
ALT SERPL W/O P-5'-P-CCNC: 19 U/L (ref 10–44)
ANION GAP SERPL CALC-SCNC: 7 MMOL/L (ref 8–16)
AST SERPL-CCNC: 22 U/L (ref 10–40)
BACTERIA BLD CULT: NORMAL
BACTERIA BLD CULT: NORMAL
BASOPHILS # BLD AUTO: 0.02 K/UL (ref 0–0.2)
BASOPHILS NFR BLD: 0.3 % (ref 0–1.9)
BILIRUB SERPL-MCNC: 0.2 MG/DL (ref 0.1–1)
BUN SERPL-MCNC: 5 MG/DL (ref 6–20)
CALCIUM SERPL-MCNC: 7.3 MG/DL (ref 8.7–10.5)
CHLORIDE SERPL-SCNC: 113 MMOL/L (ref 95–110)
CO2 SERPL-SCNC: 24 MMOL/L (ref 23–29)
CREAT SERPL-MCNC: 0.5 MG/DL (ref 0.5–1.4)
DIFFERENTIAL METHOD: ABNORMAL
EOSINOPHIL # BLD AUTO: 0 K/UL (ref 0–0.5)
EOSINOPHIL NFR BLD: 0 % (ref 0–8)
ERYTHROCYTE [DISTWIDTH] IN BLOOD BY AUTOMATED COUNT: 18.2 % (ref 11.5–14.5)
EST. GFR  (AFRICAN AMERICAN): >60 ML/MIN/1.73 M^2
EST. GFR  (NON AFRICAN AMERICAN): >60 ML/MIN/1.73 M^2
GLUCOSE SERPL-MCNC: 90 MG/DL (ref 70–110)
HCT VFR BLD AUTO: 28.8 % (ref 40–54)
HGB BLD-MCNC: 8.5 G/DL (ref 14–18)
IMM GRANULOCYTES # BLD AUTO: 0.05 K/UL (ref 0–0.04)
IMM GRANULOCYTES NFR BLD AUTO: 0.6 % (ref 0–0.5)
LYMPHOCYTES # BLD AUTO: 2.2 K/UL (ref 1–4.8)
LYMPHOCYTES NFR BLD: 27 % (ref 18–48)
MAGNESIUM SERPL-MCNC: 1.5 MG/DL (ref 1.6–2.6)
MAGNESIUM SERPL-MCNC: 2.2 MG/DL (ref 1.6–2.6)
MCH RBC QN AUTO: 29.2 PG (ref 27–31)
MCHC RBC AUTO-ENTMCNC: 29.5 G/DL (ref 32–36)
MCV RBC AUTO: 99 FL (ref 82–98)
MONOCYTES # BLD AUTO: 0.8 K/UL (ref 0.3–1)
MONOCYTES NFR BLD: 9.5 % (ref 4–15)
NEUTROPHILS # BLD AUTO: 5 K/UL (ref 1.8–7.7)
NEUTROPHILS NFR BLD: 62.6 % (ref 38–73)
NRBC BLD-RTO: 0 /100 WBC
PHOSPHATE SERPL-MCNC: 2.8 MG/DL (ref 2.7–4.5)
PLATELET # BLD AUTO: 367 K/UL (ref 150–350)
PMV BLD AUTO: 9.8 FL (ref 9.2–12.9)
POCT GLUCOSE: 107 MG/DL (ref 70–110)
POCT GLUCOSE: 127 MG/DL (ref 70–110)
POCT GLUCOSE: 181 MG/DL (ref 70–110)
POCT GLUCOSE: 81 MG/DL (ref 70–110)
POTASSIUM SERPL-SCNC: 3.3 MMOL/L (ref 3.5–5.1)
POTASSIUM SERPL-SCNC: 4.3 MMOL/L (ref 3.5–5.1)
PROT SERPL-MCNC: 5.8 G/DL (ref 6–8.4)
RBC # BLD AUTO: 2.91 M/UL (ref 4.6–6.2)
SODIUM SERPL-SCNC: 144 MMOL/L (ref 136–145)
WBC # BLD AUTO: 8 K/UL (ref 3.9–12.7)

## 2020-08-31 PROCEDURE — 83735 ASSAY OF MAGNESIUM: CPT

## 2020-08-31 PROCEDURE — 84100 ASSAY OF PHOSPHORUS: CPT

## 2020-08-31 PROCEDURE — 20000000 HC ICU ROOM

## 2020-08-31 PROCEDURE — 94761 N-INVAS EAR/PLS OXIMETRY MLT: CPT

## 2020-08-31 PROCEDURE — 25000242 PHARM REV CODE 250 ALT 637 W/ HCPCS: Performed by: NURSE PRACTITIONER

## 2020-08-31 PROCEDURE — 63600175 PHARM REV CODE 636 W HCPCS: Performed by: PSYCHIATRY & NEUROLOGY

## 2020-08-31 PROCEDURE — 87070 CULTURE OTHR SPECIMN AEROBIC: CPT

## 2020-08-31 PROCEDURE — 99900026 HC AIRWAY MAINTENANCE (STAT)

## 2020-08-31 PROCEDURE — 25000003 PHARM REV CODE 250: Performed by: NURSE PRACTITIONER

## 2020-08-31 PROCEDURE — 99291 CRITICAL CARE FIRST HOUR: CPT | Mod: ,,, | Performed by: PSYCHIATRY & NEUROLOGY

## 2020-08-31 PROCEDURE — 83735 ASSAY OF MAGNESIUM: CPT | Mod: 91

## 2020-08-31 PROCEDURE — 25000003 PHARM REV CODE 250: Performed by: PHYSICIAN ASSISTANT

## 2020-08-31 PROCEDURE — 25000003 PHARM REV CODE 250: Performed by: PSYCHIATRY & NEUROLOGY

## 2020-08-31 PROCEDURE — 31720 CLEARANCE OF AIRWAYS: CPT

## 2020-08-31 PROCEDURE — 94640 AIRWAY INHALATION TREATMENT: CPT

## 2020-08-31 PROCEDURE — 87186 SC STD MICRODIL/AGAR DIL: CPT

## 2020-08-31 PROCEDURE — 97803 MED NUTRITION INDIV SUBSEQ: CPT

## 2020-08-31 PROCEDURE — 63600175 PHARM REV CODE 636 W HCPCS: Performed by: NURSE PRACTITIONER

## 2020-08-31 PROCEDURE — 87205 SMEAR GRAM STAIN: CPT

## 2020-08-31 PROCEDURE — 84132 ASSAY OF SERUM POTASSIUM: CPT

## 2020-08-31 PROCEDURE — 99900035 HC TECH TIME PER 15 MIN (STAT)

## 2020-08-31 PROCEDURE — 63600175 PHARM REV CODE 636 W HCPCS: Performed by: PHYSICIAN ASSISTANT

## 2020-08-31 PROCEDURE — 99291 PR CRITICAL CARE, E/M 30-74 MINUTES: ICD-10-PCS | Mod: ,,, | Performed by: PSYCHIATRY & NEUROLOGY

## 2020-08-31 PROCEDURE — 87077 CULTURE AEROBIC IDENTIFY: CPT

## 2020-08-31 PROCEDURE — 85025 COMPLETE CBC W/AUTO DIFF WBC: CPT

## 2020-08-31 PROCEDURE — 80053 COMPREHEN METABOLIC PANEL: CPT

## 2020-08-31 RX ORDER — POTASSIUM CHLORIDE 7.45 MG/ML
40 INJECTION INTRAVENOUS
Status: DISCONTINUED | OUTPATIENT
Start: 2020-08-31 | End: 2020-09-03

## 2020-08-31 RX ORDER — BACLOFEN 10 MG/1
10 TABLET ORAL 3 TIMES DAILY
Status: DISCONTINUED | OUTPATIENT
Start: 2020-09-01 | End: 2020-09-18 | Stop reason: HOSPADM

## 2020-08-31 RX ORDER — MAGNESIUM SULFATE HEPTAHYDRATE 40 MG/ML
2 INJECTION, SOLUTION INTRAVENOUS
Status: DISCONTINUED | OUTPATIENT
Start: 2020-08-31 | End: 2020-09-03

## 2020-08-31 RX ORDER — PROPRANOLOL HYDROCHLORIDE 20 MG/1
20 TABLET ORAL 3 TIMES DAILY
Status: DISCONTINUED | OUTPATIENT
Start: 2020-09-01 | End: 2020-09-18 | Stop reason: HOSPADM

## 2020-08-31 RX ORDER — MAGNESIUM SULFATE HEPTAHYDRATE 40 MG/ML
4 INJECTION, SOLUTION INTRAVENOUS
Status: DISCONTINUED | OUTPATIENT
Start: 2020-08-31 | End: 2020-09-03

## 2020-08-31 RX ADMIN — ACETAMINOPHEN 650 MG: 325 TABLET ORAL at 07:08

## 2020-08-31 RX ADMIN — POTASSIUM CHLORIDE 40 MEQ: 7.46 INJECTION, SOLUTION INTRAVENOUS at 06:08

## 2020-08-31 RX ADMIN — CIPROFLOXACIN 400 MG: 2 INJECTION, SOLUTION INTRAVENOUS at 11:08

## 2020-08-31 RX ADMIN — HEPARIN SODIUM 5000 UNITS: 5000 INJECTION INTRAVENOUS; SUBCUTANEOUS at 08:08

## 2020-08-31 RX ADMIN — MICONAZOLE NITRATE: 20 OINTMENT TOPICAL at 08:08

## 2020-08-31 RX ADMIN — MAGNESIUM SULFATE IN WATER 2 G: 40 INJECTION, SOLUTION INTRAVENOUS at 06:08

## 2020-08-31 RX ADMIN — CASTOR OIL AND BALSAM, PERU: 788; 87 OINTMENT TOPICAL at 08:08

## 2020-08-31 RX ADMIN — Medication 1 CAPSULE: at 08:08

## 2020-08-31 RX ADMIN — BACLOFEN 10 MG: 10 TABLET ORAL at 02:08

## 2020-08-31 RX ADMIN — CIPROFLOXACIN 400 MG: 2 INJECTION, SOLUTION INTRAVENOUS at 07:08

## 2020-08-31 RX ADMIN — PROPRANOLOL HYDROCHLORIDE 20 MG: 10 TABLET ORAL at 08:08

## 2020-08-31 RX ADMIN — BACLOFEN 10 MG: 10 TABLET ORAL at 08:08

## 2020-08-31 RX ADMIN — PHENOBARBITAL 100 MG: 20 ELIXIR ORAL at 11:08

## 2020-08-31 RX ADMIN — BACITRACIN: 500 OINTMENT TOPICAL at 09:08

## 2020-08-31 RX ADMIN — CIPROFLOXACIN 400 MG: 2 INJECTION, SOLUTION INTRAVENOUS at 02:08

## 2020-08-31 RX ADMIN — ALBUTEROL SULFATE 2.5 MG: 2.5 SOLUTION RESPIRATORY (INHALATION) at 09:08

## 2020-08-31 RX ADMIN — PROPRANOLOL HYDROCHLORIDE 20 MG: 10 TABLET ORAL at 02:08

## 2020-08-31 NOTE — PLAN OF CARE
Patient transferred back to [facility] for respiratory distress per MD.    Not medically ready for discharge.     Wound care is recommending [employer] Fluid Immersion Simulation System for home use.        08/31/20 0453   Discharge Reassessment   Assessment Type Discharge Planning Reassessment   Provided patient/caregiver education on the expected discharge date and the discharge plan No   Do you have any problems affording any of your prescribed medications? No   Discharge Plan A Home with family;Home Health   Discharge Plan B Home with family;Inpatient Hospice   DME Needed Upon Discharge  other (see comments);hospital bed  ([facility])   Patient choice form signed by patient/caregiver N/A   Anticipated Discharge Disposition Home-Health   Can the patient/caregiver answer the patient profile reliably? No, cognitively impaired   Describe the patient's ability to walk at the present time. Does not walk or unable to take any steps at all   How often would a person be available to care for the patient? Whenever needed       Xochitl Hernandez RN, CCRN-K, [facility]  Neuro-Critical Care   X [phone]

## 2020-08-31 NOTE — PROGRESS NOTES
"[facility]  Adult Nutrition  Progress Note    SUMMARY       Recommendations    1. Suggest TF of Peptamen 1.5 with prebio @ 20 mL/hr increase to goal rate of 40 mL/hr to provide pt with 1440 kcal, 65 g protein and 739 mL free water.               -Additional water per MD. Hold for residuals >500 mL.   2. Continue lactobacillus rhamnosus 2/2 diarrhea   3. Add Vitamin C, zinc and MVI to aid in wound healing     RD to monitor and follow up    Goals: pt to receive nutrition by RD follow up  Nutrition Goal Status: progressing towards goal  Communication of RD Recs: (POC)    Reason for Assessment    Reason For Assessment: RD follow-up  Diagnosis: (Encephalopathy acute)  Relevant Medical History: Cerebral palsy; Seizures; PEG; Acid reflux  Interdisciplinary Rounds: did not attend  General Information Comments: Pt extubated, stepped down, then transferred to ICU after RR. No family members at bedside, spoke with RN. TF on hold 2/ intubation watch, but restarting today. NFPE completed . Pt meets criteria for moderate malnutrition at this time, with some muscle loss related to wt loss and cerebral palsy related.  Nutrition Discharge Planning: unable to determine at this time    Nutrition Risk Screen    Nutrition Risk Screen: tube feeding or parenteral nutrition    Nutrition/Diet History    Food Allergies: NKFA  Factors Affecting Nutritional Intake: NPO, on mechanical ventilation  Nutrition Support Formula Prior to Admit: Other (see comments)(Ensure 4 cans/day)    Anthropometrics    Temp: 97.9 °F (36.6 °C)  Height Method: Stated  Height: 4' 9" (144.8 cm)  Height (inches): 57 in  Weight Method: Bed Scale  Weight: 33.1 kg (73 lb)  Weight (lb): 73 lb  Ideal Body Weight (IBW), Male: 88 lb  % Ideal Body Weight, Male (lb): 82.95 %  BMI (Calculated): 15.8  BMI Grade: less than 16 protein-energy malnutrition grade III  Usual Body Weight (UBW), k.64 kg  % Usual Body Weight: 85.87   "   Lab/Procedures/Meds    Pertinent Labs Reviewed: reviewed  Pertinent Labs Comments: BUN 5  Pertinent Medications Reviewed: reviewed  Pertinent Medications Comments: lasix, lactobacillus rhamnosus, propranolol    Estimated/Assessed Needs    Weight Used For Calorie Calculations: 33.1 kg (72 lb 15.6 oz)  Energy Calorie Requirements (kcal): 1412 kcal/day  Energy Need Method: [place]-St Jeor(x 1.25)  Protein Requirements: 53-66 gm/day  Weight Used For Protein Calculations: 33.1 kg (72 lb 15.6 oz)  Fluid Requirements (mL): 1 mL/kcal or per MD     RDA Method (mL): 1412     Nutrition Prescription Ordered    Current Diet Order: NPO    Evaluation of Received Nutrient/Fluid Intake    I/O: +5.862L since admit  Energy Calories Required: not meeting needs  Protein Required: not meeting needs  Comments: LBM 8/31  % Intake of Estimated Energy Needs: 0 - 25 %  % Meal Intake: NPO    Nutrition Risk    Level of Risk/Frequency of Follow-up: high     Assessment and Plan    Severe protein-calorie malnutrition    Nutrition Problem:  Moderate Protein-Calorie Malnutrition  Malnutrition in the context of Acute Illness/Injury    Related to (etiology):  Increased nutrient needs    Signs and Symptoms (as evidenced by):  Energy Intake: <75% of estimated energy requirement for >2 weeks  Body Fat Depletion: moderate depletion of orbitals and triceps   Muscle Mass Depletion: moderate and severe depletion of temples, clavicle region, interosseous muscle and lower extremities   Weight Loss: 14% x >2 weeks     Interventions (treatment strategy):  Collaboration of care with other providers  Enteral nutrition    Nutrition Diagnosis Status:  Continues            Monitor and Evaluation    Food and Nutrient Intake: energy intake, enteral nutrition intake  Food and Nutrient Adminstration: enteral and parenteral nutrition administration  Anthropometric Measurements: weight, weight change  Biochemical Data, Medical Tests and Procedures: electrolyte and renal  panel, gastrointestinal profile, glucose/endocrine profile, inflammatory profile, lipid profile  Nutrition-Focused Physical Findings: overall appearance     Malnutrition Assessment  Malnutrition Type: acute illness or injury          Weight Loss (Malnutrition): (14 % x >2 weeks)  Subcutaneous Fat (Malnutrition): moderate depletion  Muscle Mass (Malnutrition): moderate depletion   Orbital Region (Subcutaneous Fat Loss): moderate depletion  Upper Arm Region (Subcutaneous Fat Loss): moderate depletion   Yarsanism Region (Muscle Loss): severe depletion  Clavicle Bone Region (Muscle Loss): severe depletion  Clavicle and Acromion Bone Region (Muscle Loss): severe depletion  Dorsal Hand (Muscle Loss): moderate depletion  Patellar Region (Muscle Loss): moderate depletion  Anterior Thigh Region (Muscle Loss): severe depletion  Posterior Calf Region (Muscle Loss): severe depletion   Edema (Fluid Accumulation): 0-->no edema present             Nutrition Follow-Up    RD Follow-up?: Yes

## 2020-08-31 NOTE — ASSESSMENT & PLAN NOTE
-- At baseline he is confused but able to interact and unable to walk  -- Initially found unresponsive    -- EEG does not show any epileptiform activity   -- Encephalopathy most likely due to a combination of dehydration and Pneumonia  -- Per pt mother, pt is close to baseline neuro status

## 2020-08-31 NOTE — PLAN OF CARE
[facility] Care Plan    POC reviewed with Glenbonnie Moscoso and mother  at 0300. Mother verbalized understanding. Questions and concerns addressed. No acute events overnight. Pt sats 100% on aerosol mask. No stridor or respiratory distress. Pt progressing toward goals. Will continue to monitor. See below and flowsheets for full assessment and VS info.       Neuro:  Jeannette Coma Scale  Best Eye Response: 4-->(E4) spontaneous  Best Motor Response: 5-->(M5) localizes pain  Best Verbal Response: 1-->(V1) none  Jeannette Coma Scale Score: 10  Assessment Qualifiers: patient not sedated/intubated, no eye obstruction present(Pt baseline nonverbal)  Pupil PERRLA: yes     24hr Temp:  [97.1 °F (36.2 °C)-98.6 °F (37 °C)]     CV:   Rhythm: normal sinus rhythm  BP goals:   SBP <  180  MAP > 65    Resp: Aerosol Mask, Sats 100%    Plan:  Monitor on Aerosol Mask    GI/:  JUSTO Total Score: 0  Diet/Nutrition Received: NPO  Last Bowel Movement: 08/31/20  Voiding Characteristics: external catheter    Intake/Output Summary (Last 24 hours) at 8/31/2020 0714  Last data filed at 8/31/2020 0624  Gross per 24 hour   Intake 1925 ml   Output 2375 ml   Net -450 ml     Unmeasured Output  Urine Occurrence: 1  Stool Occurrence: 1  Emesis Occurrence: 0  Pad Count: 3    Labs/Accuchecks:  Recent Labs   Lab 08/31/20 0213   WBC 8.00   RBC 2.91*   HGB 8.5*   HCT 28.8*   *      Recent Labs   Lab 08/31/20 0213      K 3.3*   CO2 24   *   BUN 5*   CREATININE 0.5   ALKPHOS 92   ALT 19   AST 22   BILITOT 0.2    No results for input(s): PROTIME, INR, APTT, HEPANTIXA in the last 168 hours. No results for input(s): CPK, CPKMB, TROPONINI, MB in the last 168 hours.    Electrolytes: Electrolytes replaced  Accuchecks: Q6H    Gtts:       LDA/Wounds:  Lines/Drains/Airways       Drain                   Gastrostomy/Enterostomy 08/04/20 1653 Percutaneous endoscopic gastrostomy (PEG) feeding 26 days              Airway                   Nasopharyngeal Airway less  than 1 day              Peripheral Intravenous Line                   Midline Catheter Insertion/Assessment  - Single Lumen 08/26/20 1145 Left brachial vein 18g x 8cm 4 days         Peripheral IV - Single Lumen 08/26/20 2124 22 G Anterior;Right Upper Arm 4 days                  Wounds: Yes  Wound care consulted: Yes

## 2020-08-31 NOTE — PLAN OF CARE
Plan of care addressed with patient and mother. All questions answered. VSS throughout shift. Remains on Aerosol mask at 21%. Multiple loose BMs.  No signs of pain. Chlorhexidine bath completed. Will continue to monitor.

## 2020-08-31 NOTE — PLAN OF CARE
08/31/20 1146   Post-Acute Status   Post-Acute Authorization Placement   Post-Acute Placement Status Awaiting Internal Medical Clearance     Pt was extubated, stepped down, and then returned to ICU after rapid response over the weekend.     Lauren Christy LCSW  Neurocritical Care   [facility]  [id number]

## 2020-08-31 NOTE — ASSESSMENT & PLAN NOTE
-- In the past, stridor noted to improve with jaw thrust maneuver -> likely mechanical obstruction combined with inability to effectively clear secretions  -- No stridor appreciated at this time, will continue to monitor

## 2020-08-31 NOTE — ASSESSMENT & PLAN NOTE
-- Patient is wheelchair and bed bound   -- Minimal interaction with surroundings at baseline  -- Mother is care giver

## 2020-08-31 NOTE — SUBJECTIVE & OBJECTIVE
Interval History:  No acute events.    Review of Systems  Patient is nonverbal.  Objective:     Vitals:  Temp: 100.1 °F (37.8 °C)  Pulse: 93  Rhythm: normal sinus rhythm  BP: 118/64  MAP (mmHg): 85  Resp: (!) 21  SpO2: 100 %  Oxygen Concentration (%): 21  O2 Device (Oxygen Therapy): Aerosol Mask    Temp  Min: 97.3 °F (36.3 °C)  Max: 100.1 °F (37.8 °C)  Pulse  Min: 76  Max: 99  BP  Min: 103/62  Max: 152/74  MAP (mmHg)  Min: 73  Max: 107  Resp  Min: 15  Max: 22  SpO2  Min: 95 %  Max: 100 %  Oxygen Concentration (%)  Min: 21  Max: 21    08/30 0701 - 08/31 0700  In: 1925 [I.V.:185]  Out: 2375 [Urine:2375]   Unmeasured Output  Urine Occurrence: 1  Stool Occurrence: 1  Emesis Occurrence: 0  Pad Count: 3       Physical Exam  Constitutional:       General: He is not in acute distress.  HENT:      Nose: Nose normal.      Comments: Nasal trumpet removed.     Mouth/Throat:      Mouth: Mucous membranes are moist.   Eyes:      Extraocular Movements: Extraocular movements intact.      Pupils: Pupils are equal, round, and reactive to light.   Cardiovascular:      Rate and Rhythm: Normal rate and regular rhythm.   Pulmonary:      Effort: No respiratory distress.      Comments: Occasional course breath sounds on the left, otherwise clear  Abdominal:      General: There is no distension.      Palpations: Abdomen is soft.   Skin:     General: Skin is warm and dry.   Neurological:      Comments: Close to baseline, awake with some eye tracking     Unable to test orientation, language, memory, judgment, insight, fund of knowledge, hearing, shoulder shrug, tongue protrusion, coordination, gait due to level of consciousness.    Medications:  Continuous Scheduledbacitracin, , Daily  baclofen, 10 mg, TID  balsam peru-castor oiL, , BID  ciprofloxacin, 400 mg, Q8H  heparin (porcine), 5,000 Units, Q12H  Lactobacillus rhamnosus GG, 1 capsule, Daily  miconazole nitrate 2%, , BID  PHENobarbitaL, 100 mg, QHS  propranoloL, 20 mg,  TID    PRNacetaminophen, 650 mg, Q4H PRN  albuterol sulfate, 2.5 mg, Q6H PRN  calcium gluconate IVPB, 1 g, PRN  calcium gluconate IVPB, 2 g, PRN  calcium gluconate IVPB, 3 g, PRN  magnesium sulfate IVPB, 2 g, PRN  magnesium sulfate IVPB, 4 g, PRN  ondansetron, 4 mg, Q8H PRN  potassium chloride in water, 40 mEq, PRN    And  potassium chloride in water, 40 mEq, PRN    And  potassium chloride in water, 40 mEq, PRN  sodium chloride 0.9%, 10 mL, PRN  sodium phosphate IVPB, 15 mmol, PRN  sodium phosphate IVPB, 20.01 mmol, PRN  sodium phosphate IVPB, 30 mmol, PRN      Today I personally reviewed pertinent medications, lines/drains/airways, imaging, cardiology results, laboratory results, microbiology results, notably:    Diet  Diet NPO

## 2020-08-31 NOTE — ASSESSMENT & PLAN NOTE
-- WBC 25 OSH; 8.0 today  -- BCx x2, UA negative  -- Sent C diff due to multiple episodes of diarrhea which was negative   -- Respiratory cultures are positive--> please see PNA for further plan and assessment

## 2020-08-31 NOTE — PROGRESS NOTES
[facility]  Neurocritical Care  Progress Note    Admit Date: 8/26/2020  Service Date: 08/31/2020  Length of Stay: 5    Subjective:     Chief Complaint: Acute respiratory failure with hypoxia    History of Present Illness: Glen Moscoso is a 23 yo M with a PMH of CP, Seizures, and recent admission for hyponatremia who presents to [facility] for encephalopathy, sepsis, diarrhea, and possible seizure. He presented to OSH after being found unresponsive and in  Resp distress. He was found down by his mother upon EMS arrival he had agonal resp 3-4 per min, he was intubated by EMS and take to OSH ED. He was hypotensive on arrival started on Levo gtt, received antibiotics, ABG showed metabolic acidosis. He is being admitted to [facility] for a higher level of care, EGG and close monitoring.     Hospital Course: 8/26: Admit NCC: BC, antibiotics, EEG, Levo  08/27/2020 Extubated on 8/27/20. Tube feeds were started. Awaiting Urine culture. Patient had multiple episodes of diarrhea and had an extensive antibiotic history before admission, so will collect C diff. EEG does not show any epileptiform activity or discharge. Patient extubated and started on tube feeds.   08/28/2020 Procal was elevated. Infectious workup sent which showed his c diff, and urine culture being negative. His respiratory culture showing serratia marcesens as well as pseudomonas. Patient was already started on zosyn. Per mother he is back to his baseline and does not have any ICU needs.   8/29/2020: Procal back down, d/c'd NS with KCl d/t Na increase, started enteric water flushes 150cc q6h and one time bolus 250cc, d/c'd zosyn and narrowed to cipro for PNA    8/31/2020: Started on tube feeds. Chest physiotherapy and cough assist    Interval History:  No acute events.    Review of Systems  Patient is nonverbal.  Objective:     Vitals:  Temp: 100.1 °F (37.8 °C)  Pulse: 93  Rhythm: normal sinus rhythm  BP: 118/64  MAP (mmHg): 85  Resp: (!) 21  SpO2: 100  %  Oxygen Concentration (%): 21  O2 Device (Oxygen Therapy): Aerosol Mask    Temp  Min: 97.3 °F (36.3 °C)  Max: 100.1 °F (37.8 °C)  Pulse  Min: 76  Max: 99  BP  Min: 103/62  Max: 152/74  MAP (mmHg)  Min: 73  Max: 107  Resp  Min: 15  Max: 22  SpO2  Min: 95 %  Max: 100 %  Oxygen Concentration (%)  Min: 21  Max: 21    08/30 0701 - 08/31 0700  In: 1925 [I.V.:185]  Out: 2375 [Urine:2375]   Unmeasured Output  Urine Occurrence: 1  Stool Occurrence: 1  Emesis Occurrence: 0  Pad Count: 3       Physical Exam  Constitutional:       General: He is not in acute distress.  HENT:      Nose: Nose normal.      Comments: Nasal trumpet removed.     Mouth/Throat:      Mouth: Mucous membranes are moist.   Eyes:      Extraocular Movements: Extraocular movements intact.      Pupils: Pupils are equal, round, and reactive to light.   Cardiovascular:      Rate and Rhythm: Normal rate and regular rhythm.   Pulmonary:      Effort: No respiratory distress.      Comments: Occasional course breath sounds on the left, otherwise clear  Abdominal:      General: There is no distension.      Palpations: Abdomen is soft.   Skin:     General: Skin is warm and dry.   Neurological:      Comments: Close to baseline, awake with some eye tracking     Unable to test orientation, language, memory, judgment, insight, fund of knowledge, hearing, shoulder shrug, tongue protrusion, coordination, gait due to level of consciousness.    Medications:  Continuous Scheduledbacitracin, , Daily  baclofen, 10 mg, TID  balsam peru-castor oiL, , BID  ciprofloxacin, 400 mg, Q8H  heparin (porcine), 5,000 Units, Q12H  Lactobacillus rhamnosus GG, 1 capsule, Daily  miconazole nitrate 2%, , BID  PHENobarbitaL, 100 mg, QHS  propranoloL, 20 mg, TID    PRNacetaminophen, 650 mg, Q4H PRN  albuterol sulfate, 2.5 mg, Q6H PRN  calcium gluconate IVPB, 1 g, PRN  calcium gluconate IVPB, 2 g, PRN  calcium gluconate IVPB, 3 g, PRN  magnesium sulfate IVPB, 2 g, PRN  magnesium sulfate IVPB, 4 g,  PRN  ondansetron, 4 mg, Q8H PRN  potassium chloride in water, 40 mEq, PRN    And  potassium chloride in water, 40 mEq, PRN    And  potassium chloride in water, 40 mEq, PRN  sodium chloride 0.9%, 10 mL, PRN  sodium phosphate IVPB, 15 mmol, PRN  sodium phosphate IVPB, 20.01 mmol, PRN  sodium phosphate IVPB, 30 mmol, PRN      Today I personally reviewed pertinent medications, lines/drains/airways, imaging, cardiology results, laboratory results, microbiology results, notably:    Diet  Diet NPO    Assessment/Plan:     Neuro  Encephalopathy acute  -- At baseline he is confused but able to interact and unable to walk  -- Initially found unresponsive    -- EEG does not show any epileptiform activity   -- Encephalopathy most likely due to a combination of dehydration and Pneumonia  -- Per pt mother, pt is close to baseline neuro status    Seizure disorder  -- EEG done at hospital did not show any electrographic seizures    Cerebral palsy  -- Patient is wheelchair and bed bound   -- Minimal interaction with surroundings at baseline  -- Mother is care giver     Derm  Decubitus ulcer of left buttock, stage 2  -- Wound care following    Pulmonary  Pneumonia due to Pseudomonas aerginuosa and Serratia marcesens  -- WBC 25 OSH; 8.0 today  -- BCx x2, UA negative  -- Sent C diff due to multiple episodes of diarrhea which was negative   -- Respiratory cultures are positive--> please see PNA for further plan and assessment    Renal/  Hypernatremia  -- 144, resolved.    Hypokalemia  -- 3.3 today  -- Replete    ID  Bacterial infection due to Serratia  -- Continue cipro  -- Chest physiotherapy  -- Cough assist    Endocrine  Severe protein-calorie malnutrition  -- Start tube feeds    Orthopedic  Deep tissue injury  -- R buttock   -- Wound care following    Other  Biphasic stridor  -- In the past, stridor noted to improve with jaw thrust maneuver -> likely mechanical obstruction combined with inability to effectively clear secretions  --  No stridor appreciated at this time, will continue to monitor          The patient is being Prophylaxed for:  Venous Thromboembolism with: Chemical  Stress Ulcer with: None  Ventilator Pneumonia with: not applicable    Activity Orders          Diet NPO: NPO starting at 08/26 0159        Full Code    Josue Gao MD  Neurocritical Care  [facility]

## 2020-09-01 LAB
ALBUMIN SERPL BCP-MCNC: 1.8 G/DL (ref 3.5–5.2)
ALP SERPL-CCNC: 119 U/L (ref 55–135)
ALT SERPL W/O P-5'-P-CCNC: 18 U/L (ref 10–44)
ANION GAP SERPL CALC-SCNC: 5 MMOL/L (ref 8–16)
AST SERPL-CCNC: 21 U/L (ref 10–40)
BASOPHILS # BLD AUTO: 0.01 K/UL (ref 0–0.2)
BASOPHILS NFR BLD: 0.1 % (ref 0–1.9)
BILIRUB SERPL-MCNC: 0.1 MG/DL (ref 0.1–1)
BUN SERPL-MCNC: 5 MG/DL (ref 6–20)
CALCIUM SERPL-MCNC: 7.5 MG/DL (ref 8.7–10.5)
CHLORIDE SERPL-SCNC: 108 MMOL/L (ref 95–110)
CO2 SERPL-SCNC: 25 MMOL/L (ref 23–29)
CREAT SERPL-MCNC: 0.6 MG/DL (ref 0.5–1.4)
DIFFERENTIAL METHOD: ABNORMAL
EOSINOPHIL # BLD AUTO: 0 K/UL (ref 0–0.5)
EOSINOPHIL NFR BLD: 0 % (ref 0–8)
ERYTHROCYTE [DISTWIDTH] IN BLOOD BY AUTOMATED COUNT: 17.1 % (ref 11.5–14.5)
EST. GFR  (AFRICAN AMERICAN): >60 ML/MIN/1.73 M^2
EST. GFR  (NON AFRICAN AMERICAN): >60 ML/MIN/1.73 M^2
GLUCOSE SERPL-MCNC: 124 MG/DL (ref 70–110)
HCT VFR BLD AUTO: 29.5 % (ref 40–54)
HGB BLD-MCNC: 8.9 G/DL (ref 14–18)
IMM GRANULOCYTES # BLD AUTO: 0.05 K/UL (ref 0–0.04)
IMM GRANULOCYTES NFR BLD AUTO: 0.5 % (ref 0–0.5)
INR PPP: 1.1 (ref 0.8–1.2)
LYMPHOCYTES # BLD AUTO: 1.4 K/UL (ref 1–4.8)
LYMPHOCYTES NFR BLD: 14.8 % (ref 18–48)
MAGNESIUM SERPL-MCNC: 1.8 MG/DL (ref 1.6–2.6)
MCH RBC QN AUTO: 29.2 PG (ref 27–31)
MCHC RBC AUTO-ENTMCNC: 30.2 G/DL (ref 32–36)
MCV RBC AUTO: 97 FL (ref 82–98)
MONOCYTES # BLD AUTO: 0.8 K/UL (ref 0.3–1)
MONOCYTES NFR BLD: 8.2 % (ref 4–15)
NEUTROPHILS # BLD AUTO: 7.3 K/UL (ref 1.8–7.7)
NEUTROPHILS NFR BLD: 76.4 % (ref 38–73)
NRBC BLD-RTO: 0 /100 WBC
PHOSPHATE SERPL-MCNC: 1.2 MG/DL (ref 2.7–4.5)
PHOSPHATE SERPL-MCNC: 3 MG/DL (ref 2.7–4.5)
PLATELET # BLD AUTO: 388 K/UL (ref 150–350)
PMV BLD AUTO: 9.6 FL (ref 9.2–12.9)
POTASSIUM SERPL-SCNC: 3.5 MMOL/L (ref 3.5–5.1)
POTASSIUM SERPL-SCNC: 4.5 MMOL/L (ref 3.5–5.1)
PROT SERPL-MCNC: 6.1 G/DL (ref 6–8.4)
PROTHROMBIN TIME: 11.7 SEC (ref 9–12.5)
RBC # BLD AUTO: 3.05 M/UL (ref 4.6–6.2)
SODIUM SERPL-SCNC: 138 MMOL/L (ref 136–145)
WBC # BLD AUTO: 9.52 K/UL (ref 3.9–12.7)

## 2020-09-01 PROCEDURE — 94761 N-INVAS EAR/PLS OXIMETRY MLT: CPT

## 2020-09-01 PROCEDURE — 83735 ASSAY OF MAGNESIUM: CPT

## 2020-09-01 PROCEDURE — 20000000 HC ICU ROOM

## 2020-09-01 PROCEDURE — 84132 ASSAY OF SERUM POTASSIUM: CPT

## 2020-09-01 PROCEDURE — 25000003 PHARM REV CODE 250: Performed by: PSYCHIATRY & NEUROLOGY

## 2020-09-01 PROCEDURE — 27000221 HC OXYGEN, UP TO 24 HOURS

## 2020-09-01 PROCEDURE — 63600175 PHARM REV CODE 636 W HCPCS: Performed by: NURSE PRACTITIONER

## 2020-09-01 PROCEDURE — 25000003 PHARM REV CODE 250: Performed by: NURSE PRACTITIONER

## 2020-09-01 PROCEDURE — 94668 MNPJ CHEST WALL SBSQ: CPT

## 2020-09-01 PROCEDURE — 85025 COMPLETE CBC W/AUTO DIFF WBC: CPT

## 2020-09-01 PROCEDURE — 80053 COMPREHEN METABOLIC PANEL: CPT

## 2020-09-01 PROCEDURE — 99233 PR SUBSEQUENT HOSPITAL CARE,LEVL III: ICD-10-PCS | Mod: ,,, | Performed by: PSYCHIATRY & NEUROLOGY

## 2020-09-01 PROCEDURE — 85610 PROTHROMBIN TIME: CPT

## 2020-09-01 PROCEDURE — 25000003 PHARM REV CODE 250: Performed by: PHYSICIAN ASSISTANT

## 2020-09-01 PROCEDURE — 63600175 PHARM REV CODE 636 W HCPCS: Performed by: PSYCHIATRY & NEUROLOGY

## 2020-09-01 PROCEDURE — 84100 ASSAY OF PHOSPHORUS: CPT | Mod: 91

## 2020-09-01 PROCEDURE — 99233 SBSQ HOSP IP/OBS HIGH 50: CPT | Mod: ,,, | Performed by: PSYCHIATRY & NEUROLOGY

## 2020-09-01 PROCEDURE — 87040 BLOOD CULTURE FOR BACTERIA: CPT | Mod: 59

## 2020-09-01 PROCEDURE — 63600175 PHARM REV CODE 636 W HCPCS: Performed by: PHYSICIAN ASSISTANT

## 2020-09-01 PROCEDURE — 84100 ASSAY OF PHOSPHORUS: CPT

## 2020-09-01 PROCEDURE — 99900035 HC TECH TIME PER 15 MIN (STAT)

## 2020-09-01 RX ADMIN — CASTOR OIL AND BALSAM, PERU: 788; 87 OINTMENT TOPICAL at 08:09

## 2020-09-01 RX ADMIN — MICONAZOLE NITRATE: 20 OINTMENT TOPICAL at 08:09

## 2020-09-01 RX ADMIN — HEPARIN SODIUM 5000 UNITS: 5000 INJECTION INTRAVENOUS; SUBCUTANEOUS at 08:09

## 2020-09-01 RX ADMIN — PROPRANOLOL HYDROCHLORIDE 10 MG: 20 TABLET ORAL at 10:09

## 2020-09-01 RX ADMIN — CIPROFLOXACIN 400 MG: 2 INJECTION, SOLUTION INTRAVENOUS at 08:09

## 2020-09-01 RX ADMIN — BACLOFEN 10 MG: 10 TABLET ORAL at 08:09

## 2020-09-01 RX ADMIN — BACLOFEN 10 MG: 10 TABLET ORAL at 02:09

## 2020-09-01 RX ADMIN — POTASSIUM CHLORIDE 10 MEQ: 7.46 INJECTION, SOLUTION INTRAVENOUS at 09:09

## 2020-09-01 RX ADMIN — PHENOBARBITAL 100 MG: 20 ELIXIR ORAL at 08:09

## 2020-09-01 RX ADMIN — POTASSIUM CHLORIDE 10 MEQ: 7.46 INJECTION, SOLUTION INTRAVENOUS at 07:09

## 2020-09-01 RX ADMIN — POTASSIUM CHLORIDE 10 MEQ: 7.46 INJECTION, SOLUTION INTRAVENOUS at 04:09

## 2020-09-01 RX ADMIN — POTASSIUM CHLORIDE 10 MEQ: 7.46 INJECTION, SOLUTION INTRAVENOUS at 05:09

## 2020-09-01 RX ADMIN — SODIUM PHOSPHATE, MONOBASIC, MONOHYDRATE 30 MMOL: 276; 142 INJECTION, SOLUTION INTRAVENOUS at 05:09

## 2020-09-01 RX ADMIN — CIPROFLOXACIN 400 MG: 2 INJECTION, SOLUTION INTRAVENOUS at 03:09

## 2020-09-01 RX ADMIN — BACITRACIN: 500 OINTMENT TOPICAL at 08:09

## 2020-09-01 RX ADMIN — PROPRANOLOL HYDROCHLORIDE 20 MG: 20 TABLET ORAL at 01:09

## 2020-09-01 RX ADMIN — CIPROFLOXACIN 400 MG: 2 INJECTION, SOLUTION INTRAVENOUS at 12:09

## 2020-09-01 RX ADMIN — ACETAMINOPHEN 650 MG: 325 TABLET ORAL at 05:09

## 2020-09-01 RX ADMIN — PROPRANOLOL HYDROCHLORIDE 20 MG: 20 TABLET ORAL at 08:09

## 2020-09-01 RX ADMIN — Medication 1 CAPSULE: at 08:09

## 2020-09-01 NOTE — ASSESSMENT & PLAN NOTE
-- At baseline he is confused but able to interact and unable to walk  -- Initially found unresponsive prior to coming to the unit the first time recently  -- EEG does not show any epileptiform activity   -- Encephalopathy was most likely due to a combination of dehydration and pneumonia  -- Per pt mother, pt is at baseline neuro status  -- Continue same AEDs

## 2020-09-01 NOTE — PROGRESS NOTES
[facility]  Neurocritical Care  Progress Note    Admit Date: 8/26/2020  Service Date: 09/01/2020  Length of Stay: 6    Subjective:     Chief Complaint: Acute respiratory failure with hypoxia    History of Present Illness: Glen Moscoso is a 23 yo M with a PMH of CP, Seizures, and recent admission for hyponatremia who presents to [facility] for encephalopathy, sepsis, diarrhea, and possible seizure. He presented to OSH after being found unresponsive and in  Resp distress. He was found down by his mother upon EMS arrival he had agonal resp 3-4 per min, he was intubated by EMS and take to OSH ED. He was hypotensive on arrival started on Levo gtt, received antibiotics, ABG showed metabolic acidosis. He is being admitted to [facility] for a higher level of care, EGG and close monitoring.     Hospital Course: 8/26: Admit NCC: BC, antibiotics, EEG, Levo  08/27/2020 Extubated on 8/27/20. Tube feeds were started. Awaiting Urine culture. Patient had multiple episodes of diarrhea and had an extensive antibiotic history before admission, so will collect C diff. EEG does not show any epileptiform activity or discharge. Patient extubated and started on tube feeds.   08/28/2020 Procal was elevated. Infectious workup sent which showed his c diff, and urine culture being negative. His respiratory culture showing serratia marcesens as well as pseudomonas. Patient was already started on zosyn. Per mother he is back to his baseline and does not have any ICU needs.   8/29/2020: Procal back down, d/c'd NS with KCl d/t Na increase, started enteric water flushes 150cc q6h and one time bolus 250cc, d/c'd zosyn and narrowed to cipro for PNA  8/30/2020:   8/31/2020: Started on tube feeds. Chest physiotherapy and cough assist  9/1/2020: Still with coarse breath sounds, holding water flushes until Na stable. Upper/lower DVT scan (screening).    Interval History:  No acute events. Patient febrile.    Review of Systems  Unable to obtain a  complete ROS due to level of consciousness (baseline).    Objective:     Vitals:  Temp: 99.1 °F (37.3 °C)  Pulse: 104  Rhythm: normal sinus rhythm  BP: 120/76  MAP (mmHg): 93  Resp: (!) 23  SpO2: 100 %  Oxygen Concentration (%): 21  O2 Device (Oxygen Therapy): Aerosol Mask    Temp  Min: 99.1 °F (37.3 °C)  Max: 102.5 °F (39.2 °C)  Pulse  Min: 83  Max: 132  BP  Min: 107/64  Max: 151/101  MAP (mmHg)  Min: 73  Max: 121  Resp  Min: 16  Max: 51  SpO2  Min: 94 %  Max: 100 %  Oxygen Concentration (%)  Min: 21  Max: 21    08/31 0701 - 09/01 0700  In: 1105 [I.V.:65]  Out: -    Unmeasured Output  Urine Occurrence: 1  Stool Occurrence: 1  Emesis Occurrence: 0  Pad Count: 3       Physical Exam  Vitals signs and nursing note reviewed.   Constitutional:       General: He is not in acute distress.     Appearance: He is not diaphoretic.   HENT:      Head: Atraumatic.      Nose: Nose normal.      Mouth/Throat:      Mouth: Mucous membranes are dry.   Eyes:      Extraocular Movements: Extraocular movements intact.      Pupils: Pupils are equal, round, and reactive to light.   Cardiovascular:      Rate and Rhythm: Normal rate and regular rhythm.      Heart sounds: Normal heart sounds.   Pulmonary:      Comments: Course breath sounds bilaterally  Abdominal:      General: There is no distension.      Palpations: Abdomen is soft.   Musculoskeletal:      Right lower leg: No edema.      Left lower leg: No edema.   Skin:     Coloration: Skin is not jaundiced.   Neurological:      Mental Status: Mental status is at baseline.       Unable to test orientation, language, memory, judgment, insight, fund of knowledge, hearing, shoulder shrug, tongue protrusion, coordination, gait due to level of consciousness.    Medications:  Continuous Scheduledbacitracin, , Daily  baclofen, 10 mg, TID  balsam peru-castor oiL, , BID  ciprofloxacin, 400 mg, Q8H  heparin (porcine), 5,000 Units, Q12H  Lactobacillus rhamnosus GG, 1 capsule, Daily  miconazole nitrate  2%, , BID  PHENobarbitaL, 100 mg, QHS  propranoloL, 20 mg, TID    PRNacetaminophen, 650 mg, Q4H PRN  albuterol sulfate, 2.5 mg, Q6H PRN  calcium gluconate IVPB, 1 g, PRN  calcium gluconate IVPB, 2 g, PRN  calcium gluconate IVPB, 3 g, PRN  magnesium sulfate IVPB, 2 g, PRN  magnesium sulfate IVPB, 4 g, PRN  ondansetron, 4 mg, Q8H PRN  potassium chloride in water, 40 mEq, PRN    And  potassium chloride in water, 40 mEq, PRN    And  potassium chloride in water, 40 mEq, PRN  sodium chloride 0.9%, 10 mL, PRN  sodium phosphate IVPB, 15 mmol, PRN  sodium phosphate IVPB, 20.01 mmol, PRN  sodium phosphate IVPB, 30 mmol, PRN      Today I personally reviewed pertinent medications, lines/drains/airways, imaging, cardiology results, laboratory results, microbiology results,.    Diet  Diet NPO  Diet NPO    Assessment/Plan:     Neuro  Encephalopathy acute  -- At baseline he is confused but able to interact and unable to walk  -- Initially found unresponsive prior to coming to the unit the first time recently  -- EEG does not show any epileptiform activity   -- Encephalopathy was most likely due to a combination of dehydration and pneumonia  -- Per pt mother, pt is at baseline neuro status  -- Continue same AEDs    Seizure disorder  -- EEG done previously while in unit did not show any electrographic seizures    Cerebral palsy  -- Patient is wheelchair and bed bound   -- Minimal interaction with surroundings at baseline  -- Mother is care giver    Derm  Decubitus ulcer of left buttock, stage 2  -- Wound care following    Pulmonary  Pneumonia due to Pseudomonas aerginuosa and Serratia marcesens  -- WBC 9.52  -- BCx x2, UA negative  -- Sent C diff due to multiple episodes of diarrhea which was negative   -- Respiratory cultures are positive --> please see PNA for further plan and assessment    Renal/  Hypernatremia  Resolved  -- Hold water flushes, Na trending down    Hypokalemia  -- 3.5 today  -- Replete PRN    ID  Bacterial  infection due to Serratia  -- Continue cipro  -- Chest physiotherapy  -- Cough assist  -- Chest x-ray, follow up  -- Upper/lower DVT scan  -- If remains febrile, start broad-spectrum abx    Endocrine  Severe protein-calorie malnutrition  -- Continue tube feeds    Orthopedic  Deep tissue injury  -- R buttock   -- Wound care following    Other  Biphasic stridor  -- In the past, stridor noted to improve with jaw thrust maneuver -> likely mechanical obstruction combined with inability to effectively clear secretions  -- No stridor appreciated at this time, will continue to monitor          The patient is being Prophylaxed for:  Venous Thromboembolism with: Chemical  Stress Ulcer with: None  Ventilator Pneumonia with: chlorhexidine oral care    Activity Orders          Diet NPO: NPO starting at 08/26 1049        Full Code    Josue Gao MD  Neurocritical Care  [facility]

## 2020-09-01 NOTE — PLAN OF CARE
[facility] Care Plan    POC reviewed with Glen Moscoso and family at 1400. Pt unable to verbalize understanding. Questions and concerns addressed with Mother at bedside. No acute events today. WC nurse at bedside to assess skin and review wound care plan with the mother. CXR and U/S extremities ordered. New 22g PIV placed in L AC. Will continue to monitor. See below and flowsheets for full assessment and VS info.       Neuro:  Flushing Coma Scale  Best Eye Response: 4-->(E4) spontaneous  Best Motor Response: 5-->(M5) localizes pain  Best Verbal Response: 1-->(V1) none  Jeannette Coma Scale Score: 10  Assessment Qualifiers: patient not sedated/intubated  Pupil PERRLA: yes     24 hr Temp:  [99.1 °F (37.3 °C)-102.5 °F (39.2 °C)]     CV:   Rhythm: normal sinus rhythm  BP goals:   SBP < 180  MAP > 65    Resp:   O2 Device (Oxygen Therapy): Aerosol Mask  Vent Mode: Spont  Set Rate: 18 BPM  Oxygen Concentration (%): 21  Vt Set: 470 mL  PEEP/CPAP: 5 cmH20  Pressure Support: 5 cmH20    Plan: N/A    GI/:  JUSTO Total Score: 0  Diet/Nutrition Received: NPO, tube feeding  Last Bowel Movement: 09/01/20  Voiding Characteristics: incontinence    Intake/Output Summary (Last 24 hours) at 9/1/2020 1523  Last data filed at 9/1/2020 1505  Gross per 24 hour   Intake 2010 ml   Output --   Net 2010 ml     Unmeasured Output  Urine Occurrence: 1  Stool Occurrence: 1  Emesis Occurrence: 0  Pad Count: 2    Labs/Accuchecks:  Recent Labs   Lab 09/01/20  0045   WBC 9.52   RBC 3.05*   HGB 8.9*   HCT 29.5*   *      Recent Labs   Lab 09/01/20  0045 09/01/20  1223     --    K 3.5 4.5   CO2 25  --      --    BUN 5*  --    CREATININE 0.6  --    ALKPHOS 119  --    ALT 18  --    AST 21  --    BILITOT 0.1  --       Recent Labs   Lab 09/01/20  0045   INR 1.1    No results for input(s): CPK, CPKMB, TROPONINI, MB in the last 168 hours.    Electrolytes: Electrolytes replaced  Accuchecks: none    Gtts:       LDA/Wounds:  Lines/Drains/Airways        Drain                   Gastrostomy/Enterostomy 08/04/20 1653 Percutaneous endoscopic gastrostomy (PEG) feeding 27 days              Peripheral Intravenous Line                   Midline Catheter Insertion/Assessment  - Single Lumen 08/26/20 1145 Left brachial vein 18g x 8cm 6 days         Peripheral IV - Single Lumen 09/01/20 1449 22 G Left Antecubital less than 1 day                  Wounds: Yes  Wound care consulted: Yes

## 2020-09-01 NOTE — NURSING
Pt -127 sustained. NP Danita notified, verbal order to give 1500 dose of propranolol now. See MAR for administration

## 2020-09-01 NOTE — ASSESSMENT & PLAN NOTE
-- WBC 9.52  -- BCx x2, UA negative  -- Sent C diff due to multiple episodes of diarrhea which was negative   -- Respiratory cultures are positive --> please see PNA for further plan and assessment

## 2020-09-01 NOTE — PROGRESS NOTES
Patient seen for wound care follow up.   Unstageable pressure injuries noted to bilateral ischium, 100% nonviable tissue, recommend general surgery evaluation.  Shallow full thickness skin loss noted to scrotum. Skin breakdown to upper buttocks/sacrum, previously with satelite lesions appears resolved.    Recommend d/cing venelex ointment and criticaid clear barrier cream with miconazole, recommend applying Triad barrier cream to skin breakdown to bilateral ischium and scrotum., triad will assist with moisture management and assist with autolytic debridement of non-viable tissue.    Light purple discoloration noted over bilateral hip incisions, unknown etiology, recommend continue to monitor for further signs of skin breakdown or alteration in skin integrity.    Right cheek skin breakdown healed.    Right wrist with dry crust over wound bed, recommend d/cing bacitracin and paint daily with betadine.    Secure chat message sent to Danita Timmons NP with NCC.  Nursing to continue care. Wound care to follow prn.    Sizewise immerse surface being utilized.       Right ischium unstageable pressure injury: 100% nonviable tissue, pink wound borders. 5cm x 3cm    Left ischium unstageable pressure injury: 100% nonviable tissue. 5cm x 5cm  Scrotum: 0.5cm x 0.5cm x 0.1cm shallow full thickness skin loss, yellow slough to base    Left hip incision light purple discoloration over scar tissue    Right hip incision: scattered light purple discoloration on scar tissue    Right wrist: 0.5cm x 0.5cm dry crust

## 2020-09-01 NOTE — SUBJECTIVE & OBJECTIVE
Interval History:  No acute events. Patient febrile.    Review of Systems  Unable to obtain a complete ROS due to level of consciousness (baseline).    Objective:     Vitals:  Temp: 99.1 °F (37.3 °C)  Pulse: 104  Rhythm: normal sinus rhythm  BP: 120/76  MAP (mmHg): 93  Resp: (!) 23  SpO2: 100 %  Oxygen Concentration (%): 21  O2 Device (Oxygen Therapy): Aerosol Mask    Temp  Min: 99.1 °F (37.3 °C)  Max: 102.5 °F (39.2 °C)  Pulse  Min: 83  Max: 132  BP  Min: 107/64  Max: 151/101  MAP (mmHg)  Min: 73  Max: 121  Resp  Min: 16  Max: 51  SpO2  Min: 94 %  Max: 100 %  Oxygen Concentration (%)  Min: 21  Max: 21    08/31 0701 - 09/01 0700  In: 1105 [I.V.:65]  Out: -    Unmeasured Output  Urine Occurrence: 1  Stool Occurrence: 1  Emesis Occurrence: 0  Pad Count: 3       Physical Exam  Vitals signs and nursing note reviewed.   Constitutional:       General: He is not in acute distress.     Appearance: He is not diaphoretic.   HENT:      Head: Atraumatic.      Nose: Nose normal.      Mouth/Throat:      Mouth: Mucous membranes are dry.   Eyes:      Extraocular Movements: Extraocular movements intact.      Pupils: Pupils are equal, round, and reactive to light.   Cardiovascular:      Rate and Rhythm: Normal rate and regular rhythm.      Heart sounds: Normal heart sounds.   Pulmonary:      Comments: Course breath sounds bilaterally  Abdominal:      General: There is no distension.      Palpations: Abdomen is soft.   Musculoskeletal:      Right lower leg: No edema.      Left lower leg: No edema.   Skin:     Coloration: Skin is not jaundiced.   Neurological:      Mental Status: Mental status is at baseline.       Unable to test orientation, language, memory, judgment, insight, fund of knowledge, hearing, shoulder shrug, tongue protrusion, coordination, gait due to level of consciousness.    Medications:  Continuous Scheduledbacitracin, , Daily  baclofen, 10 mg, TID  balsam peru-castor oiL, , BID  ciprofloxacin, 400 mg, Q8H  heparin  (porcine), 5,000 Units, Q12H  Lactobacillus rhamnosus GG, 1 capsule, Daily  miconazole nitrate 2%, , BID  PHENobarbitaL, 100 mg, QHS  propranoloL, 20 mg, TID    PRNacetaminophen, 650 mg, Q4H PRN  albuterol sulfate, 2.5 mg, Q6H PRN  calcium gluconate IVPB, 1 g, PRN  calcium gluconate IVPB, 2 g, PRN  calcium gluconate IVPB, 3 g, PRN  magnesium sulfate IVPB, 2 g, PRN  magnesium sulfate IVPB, 4 g, PRN  ondansetron, 4 mg, Q8H PRN  potassium chloride in water, 40 mEq, PRN    And  potassium chloride in water, 40 mEq, PRN    And  potassium chloride in water, 40 mEq, PRN  sodium chloride 0.9%, 10 mL, PRN  sodium phosphate IVPB, 15 mmol, PRN  sodium phosphate IVPB, 20.01 mmol, PRN  sodium phosphate IVPB, 30 mmol, PRN      Today I personally reviewed pertinent medications, lines/drains/airways, imaging, cardiology results, laboratory results, microbiology results,.    Diet  Diet NPO  Diet NPO

## 2020-09-01 NOTE — ASSESSMENT & PLAN NOTE
-- Continue cipro  -- Chest physiotherapy  -- Cough assist  -- Chest x-ray, follow up  -- Upper/lower DVT scan  -- If remains febrile, start broad-spectrum abx

## 2020-09-02 PROBLEM — G40.901 STATUS EPILEPTICUS: Status: ACTIVE | Noted: 2020-09-02

## 2020-09-02 LAB
ALBUMIN SERPL BCP-MCNC: 1.6 G/DL (ref 3.5–5.2)
ALP SERPL-CCNC: 102 U/L (ref 55–135)
ALT SERPL W/O P-5'-P-CCNC: 15 U/L (ref 10–44)
ANION GAP SERPL CALC-SCNC: 6 MMOL/L (ref 8–16)
AST SERPL-CCNC: 29 U/L (ref 10–40)
BASOPHILS # BLD AUTO: 0.02 K/UL (ref 0–0.2)
BASOPHILS NFR BLD: 0.2 % (ref 0–1.9)
BILIRUB SERPL-MCNC: 0.2 MG/DL (ref 0.1–1)
BUN SERPL-MCNC: 5 MG/DL (ref 6–20)
CALCIUM SERPL-MCNC: 7.5 MG/DL (ref 8.7–10.5)
CHLORIDE SERPL-SCNC: 107 MMOL/L (ref 95–110)
CO2 SERPL-SCNC: 26 MMOL/L (ref 23–29)
CREAT SERPL-MCNC: 0.5 MG/DL (ref 0.5–1.4)
DIFFERENTIAL METHOD: ABNORMAL
EOSINOPHIL # BLD AUTO: 0 K/UL (ref 0–0.5)
EOSINOPHIL NFR BLD: 0 % (ref 0–8)
ERYTHROCYTE [DISTWIDTH] IN BLOOD BY AUTOMATED COUNT: 17.2 % (ref 11.5–14.5)
EST. GFR  (AFRICAN AMERICAN): >60 ML/MIN/1.73 M^2
EST. GFR  (NON AFRICAN AMERICAN): >60 ML/MIN/1.73 M^2
GLUCOSE SERPL-MCNC: 90 MG/DL (ref 70–110)
HCT VFR BLD AUTO: 25.4 % (ref 40–54)
HGB BLD-MCNC: 7.8 G/DL (ref 14–18)
IMM GRANULOCYTES # BLD AUTO: 0.03 K/UL (ref 0–0.04)
IMM GRANULOCYTES NFR BLD AUTO: 0.3 % (ref 0–0.5)
LYMPHOCYTES # BLD AUTO: 1.6 K/UL (ref 1–4.8)
LYMPHOCYTES NFR BLD: 17.2 % (ref 18–48)
MAGNESIUM SERPL-MCNC: 1.5 MG/DL (ref 1.6–2.6)
MCH RBC QN AUTO: 29.3 PG (ref 27–31)
MCHC RBC AUTO-ENTMCNC: 30.7 G/DL (ref 32–36)
MCV RBC AUTO: 96 FL (ref 82–98)
MONOCYTES # BLD AUTO: 0.9 K/UL (ref 0.3–1)
MONOCYTES NFR BLD: 10.2 % (ref 4–15)
NEUTROPHILS # BLD AUTO: 6.5 K/UL (ref 1.8–7.7)
NEUTROPHILS NFR BLD: 72.1 % (ref 38–73)
NRBC BLD-RTO: 0 /100 WBC
PHOSPHATE SERPL-MCNC: 2.2 MG/DL (ref 2.7–4.5)
PLATELET # BLD AUTO: 396 K/UL (ref 150–350)
PMV BLD AUTO: 10.2 FL (ref 9.2–12.9)
POTASSIUM SERPL-SCNC: 4.3 MMOL/L (ref 3.5–5.1)
PROT SERPL-MCNC: 5.5 G/DL (ref 6–8.4)
RBC # BLD AUTO: 2.66 M/UL (ref 4.6–6.2)
SODIUM SERPL-SCNC: 139 MMOL/L (ref 136–145)
WBC # BLD AUTO: 9.08 K/UL (ref 3.9–12.7)

## 2020-09-02 PROCEDURE — 11000001 HC ACUTE MED/SURG PRIVATE ROOM

## 2020-09-02 PROCEDURE — 63600175 PHARM REV CODE 636 W HCPCS: Performed by: NURSE PRACTITIONER

## 2020-09-02 PROCEDURE — 99233 PR SUBSEQUENT HOSPITAL CARE,LEVL III: ICD-10-PCS | Mod: ,,, | Performed by: PSYCHIATRY & NEUROLOGY

## 2020-09-02 PROCEDURE — 97803 MED NUTRITION INDIV SUBSEQ: CPT

## 2020-09-02 PROCEDURE — 99900035 HC TECH TIME PER 15 MIN (STAT)

## 2020-09-02 PROCEDURE — 84100 ASSAY OF PHOSPHORUS: CPT

## 2020-09-02 PROCEDURE — 25000003 PHARM REV CODE 250: Performed by: PSYCHIATRY & NEUROLOGY

## 2020-09-02 PROCEDURE — 80053 COMPREHEN METABOLIC PANEL: CPT

## 2020-09-02 PROCEDURE — 94668 MNPJ CHEST WALL SBSQ: CPT

## 2020-09-02 PROCEDURE — 83735 ASSAY OF MAGNESIUM: CPT

## 2020-09-02 PROCEDURE — 94761 N-INVAS EAR/PLS OXIMETRY MLT: CPT

## 2020-09-02 PROCEDURE — 99233 SBSQ HOSP IP/OBS HIGH 50: CPT | Mod: ,,, | Performed by: PSYCHIATRY & NEUROLOGY

## 2020-09-02 PROCEDURE — 25000003 PHARM REV CODE 250: Performed by: NURSE PRACTITIONER

## 2020-09-02 PROCEDURE — 63600175 PHARM REV CODE 636 W HCPCS: Performed by: PHYSICIAN ASSISTANT

## 2020-09-02 PROCEDURE — 85025 COMPLETE CBC W/AUTO DIFF WBC: CPT

## 2020-09-02 PROCEDURE — 25000003 PHARM REV CODE 250: Performed by: PHYSICIAN ASSISTANT

## 2020-09-02 RX ADMIN — PROPRANOLOL HYDROCHLORIDE 20 MG: 20 TABLET ORAL at 08:09

## 2020-09-02 RX ADMIN — PROPRANOLOL HYDROCHLORIDE 20 MG: 20 TABLET ORAL at 02:09

## 2020-09-02 RX ADMIN — SODIUM PHOSPHATE, MONOBASIC, MONOHYDRATE 20.01 MMOL: 276; 142 INJECTION, SOLUTION INTRAVENOUS at 08:09

## 2020-09-02 RX ADMIN — ACETAMINOPHEN 650 MG: 325 TABLET ORAL at 10:09

## 2020-09-02 RX ADMIN — PROPRANOLOL HYDROCHLORIDE 20 MG: 20 TABLET ORAL at 09:09

## 2020-09-02 RX ADMIN — HEPARIN SODIUM 5000 UNITS: 5000 INJECTION INTRAVENOUS; SUBCUTANEOUS at 08:09

## 2020-09-02 RX ADMIN — Medication 1 CAPSULE: at 08:09

## 2020-09-02 RX ADMIN — PHENOBARBITAL 100 MG: 20 ELIXIR ORAL at 10:09

## 2020-09-02 RX ADMIN — BACLOFEN 10 MG: 10 TABLET ORAL at 09:09

## 2020-09-02 RX ADMIN — BACLOFEN 10 MG: 10 TABLET ORAL at 08:09

## 2020-09-02 RX ADMIN — HEPARIN SODIUM 5000 UNITS: 5000 INJECTION INTRAVENOUS; SUBCUTANEOUS at 09:09

## 2020-09-02 RX ADMIN — BACLOFEN 10 MG: 10 TABLET ORAL at 02:09

## 2020-09-02 RX ADMIN — ACETAMINOPHEN 650 MG: 325 TABLET ORAL at 12:09

## 2020-09-02 RX ADMIN — MAGNESIUM SULFATE IN WATER 2 G: 40 INJECTION, SOLUTION INTRAVENOUS at 06:09

## 2020-09-02 NOTE — ASSESSMENT & PLAN NOTE
-- WBC 9.52  -- BCx x2, UA negative  -- Sent C diff due to multiple episodes of diarrhea which was negative   -- Respiratory cultures are positive --> please see PNA for further plan and assessment  -- Requires frequent suction q4, or q2 if possible

## 2020-09-02 NOTE — ASSESSMENT & PLAN NOTE
-- Patient is wheelchair and bed bound   -- Minimal interaction with surroundings at baseline  -- Mother is care giver  -- Upper/lower DVT scan reveals left basilic vein thrombosis around line. Will remove today.

## 2020-09-02 NOTE — PHYSICIAN QUERY
PT Name: Glen Moscoso  MR #: [id number]    Nutrition Clarification     CDS/: Ashish Bhatia RN              Contact information: [email or web address]    This form is a permanent document in the medical record.     Query Date: September 2, 2020    By submitting this query, we are merely seeking further clarification of documentation.. Please utilize your independent clinical judgment when addressing the question(s) below.    The medical record contains the following:   Indicators  Supporting Clinical Findings Location in Medical Record    % of Estimated Energy Intake over a time frame from p.o., TF, or TPN     x Weight Status over a time frame Weight Loss: 14% x >2 weeks       RD Note 8/27   x Subcutaneous Fat and/or Muscle Loss  some muscle loss related to wt loss and cerebral palsy related    Weight Loss (Malnutrition): (14 % x >2 weeks)  Subcutaneous Fat (Malnutrition): moderate depletion  Muscle Mass (Malnutrition): moderate depletion   Orbital Region (Subcutaneous Fat Loss): moderate depletion  Upper Arm Region (Subcutaneous Fat Loss): moderate depletion   [place] Region (Muscle Loss): severe depletion  Clavicle Bone Region (Muscle Loss): severe depletion  Clavicle and Acromion Bone Region (Muscle Loss): severe depletion  Dorsal Hand (Muscle Loss): moderate depletion  Patellar Region (Muscle Loss): moderate depletion  Anterior Thigh Region (Muscle Loss): severe depletion  Posterior Calf Region (Muscle Loss): severe depletion   Edema (Fluid Accumulation): 0-->no edema present    RD Note 8/31    Fluid Accumulation or Edema     x Wt/BMI/Usual Body Weight Weight: 33.1 kg (73 lb)  Weight (lb): 73 lb  Ideal Body Weight (IBW), Male: 88 lb  % Ideal Body Weight, Male (lb): 82.95 %  BMI (Calculated): 15.8  BMI Grade: less than 16 protein-energy malnutrition grade III   RD Note 8/31   x Delayed Wound Healing/Failure to Thrive Decubitus ulcer of left buttock, stage 2  -- Wound care  following       Orthopedic  Deep tissue injury  -- R buttock    Progress note Neuro CC (Talahma/Ali) 9/1   x Acute or Chronic Illness  PMH of CP, Seizures, and recent admission for hyponatremia who presents to [facility] for encephalopathy, sepsis, diarrhea, and possible seizure. He presented to OSH after being found unresponsive and in  Resp distress. He was found down by his mother upon EMS arrival he had agonal resp 3-4 per min, he was intubated by EMS and take to OSH ED. He was hypotensive on arrival started on Levo gtt, received antibiotics, ABG showed metabolic acidosis. He is being admitted to [facility] for a higher level of care,    Progress note Neuro CC (Talahma/Ali) 9/1    Medication      Treatment     x Other Endocrine  Severe protein-calorie malnutrition  -- Continue tube feeds    Assessment and Plan     Severe protein-calorie malnutrition    Nutrition Problem:  Moderate Protein-Calorie Malnutrition  Malnutrition in the context of Acute Illness/Injury    General Information Comments: Pt extubated, stepped down, then transferred to ICU after RR. No family members at bedside, spoke with RN. TF on hold 2/2 intubation watch, but restarting today. NFPE completed 8/27. Pt meets criteria for moderate malnutrition at this time    Endocrine  Moderate malnutrition  - continue tube feeds    Progress note Neuro CC (Beauahma/Ali) 9/1        RD Note 8/31                        Progress Note Neuro CC (Linda) 8/29     AND / ASPEN Clinical Characteristics (October 2011)  A minimum of two characteristics is recommended for diagnosing either moderate or severe malnutrition   Mild Malnutrition Moderate Malnutrition Severe Malnutrition   Energy Intake from p.o., TF or TPN. < 75% intake of estimated energy needs for less than 7 days < 75% intake of estimated energy needs for greater than 7 days < 50% intake of estimated energy needs for > 5 days   Weight Loss 1-2% in 1 month  5% in 3 months  7.5% in 6 months  10% in 1 year 1-2 % in 1  week  5% in 1 month  7.5% in 3 months  10% in 6 months  20% in 1 year > 2% in 1 week  > 5% in 1 month  > 7.5% in 3 months  > 10% in 6 months  > 20% in 1 year   Physical Findings     None *Mild subcutaneous fat and/or muscle loss  *Mild fluid accumulation  *Stage II decubitus  *Surgical wound or non-healing wound *Mod/severe subcutaneous fat and/or muscle loss  *Mod/severe fluid accumulation  *Stage III or IV decubitus  *Non-healing surgical wound     Provider, please specify diagnosis or diagnoses associated with above clinical findings.    [  x] Moderate Protein-Calorie Malnutrition   [  ] Severe Protein-Calorie Malnutrition   [  ] Other Nutritional Diagnosis (please specify): _______   [  ] Clinically Undetermined       Please document in your progress notes daily for the duration of treatment until resolved and include in your discharge summary.

## 2020-09-02 NOTE — PLAN OF CARE
Patient known to me as was stepped down to my service 8/31 after treatment for Serratia and Pseudomonas pneumonia and acute respiratory failure requiring intubation in Neuro ICU. After stepped down patient developed acute respiratory distress and stridor and rapid response called and patient transferred back to Neuro ICU for monitoring. Patient did not require reintubation and was aggressively suctioned, placed on cough assist and CPT. Stridor felt mechanical as improved with jaw thrust and holding patient's jaw closed. Patient doing better and Neuro ICU ready to step down to floor. Patient not requiring any oxygen and has completed antibiotic course for pneumonia. Wound care following patient and patient does have several areas of skin breakdown and most notably unstageable eschar to bilateral ischium and wound care recommending general surgery consult for evaluation and I spoke with Neuro ICU and they will consult general surgery prior to step down from Neuro ICU.   Patient at baseline is non-verbal and bed bound/wheelchair bound and dependent for all ADLs and cared for by his mother at home related to severe cerebral palsy. Patient has PEG tube in place and receiving TF via PEG and gets all meds via PEG and strict NPO. Patient to be stepped down to hospital medicine to [facility] when bed available.     LALITO HUNTER MD  Attending Staff Physician   Department of Hospital Medicine, [facility] on [facility]  Pager: [phone]  Spectralink: [phone]

## 2020-09-02 NOTE — SUBJECTIVE & OBJECTIVE
Interval History:  No acute events. Patient afebrile. On room air.    Review of Systems    Unable to obtain a complete ROS due to level of consciousness (baseline).    Objective:     Vitals:  Temp: 99.4 °F (37.4 °C)  Pulse: 101  Rhythm: sinus tachycardia  BP: 116/73  MAP (mmHg): 88  Resp: (!) 23  SpO2: 97 %  Oxygen Concentration (%): 40  O2 Device (Oxygen Therapy): room air    Temp  Min: 99.4 °F (37.4 °C)  Max: 99.8 °F (37.7 °C)  Pulse  Min: 93  Max: 124  BP  Min: 99/55  Max: 127/77  MAP (mmHg)  Min: 72  Max: 96  Resp  Min: 17  Max: 35  SpO2  Min: 93 %  Max: 100 %  Oxygen Concentration (%)  Min: 40  Max: 40    09/01 0701 - 09/02 0700  In: 2245 [I.V.:205]  Out: -    Unmeasured Output  Urine Occurrence: 1  Stool Occurrence: 1  Emesis Occurrence: 0  Pad Count: 2       Physical Exam  Vitals signs and nursing note reviewed.   Constitutional:       General: He is not in acute distress.     Appearance: He is not diaphoretic.   HENT:      Head: Atraumatic.      Nose: Nose normal.      Mouth/Throat:      Mouth: Mucous membranes are moist.   Eyes:      Extraocular Movements: Extraocular movements intact.      Pupils: Pupils are equal, round, and reactive to light.   Cardiovascular:      Rate and Rhythm: Normal rate and regular rhythm.      Heart sounds: Normal heart sounds.   Pulmonary:      Comments: Course breath sounds bilaterally, improved from yesterday  Abdominal:      General: There is no distension.      Palpations: Abdomen is soft.   Musculoskeletal:      Right lower leg: No edema.      Left lower leg: No edema.   Skin:     Coloration: Skin is not jaundiced.   Neurological:      Mental Status: Mental status is at baseline.       Unable to test orientation, language, memory, judgment, insight, fund of knowledge, hearing, shoulder shrug, tongue protrusion, coordination, gait due to level of consciousness.    Medications:  Continuous Scheduledbaclofen, 10 mg, TID  heparin (porcine), 5,000 Units, Q12H  Lactobacillus  rhamnosus GG, 1 capsule, Daily  PHENobarbitaL, 100 mg, QHS  propranoloL, 20 mg, TID    PRNacetaminophen, 650 mg, Q4H PRN  albuterol sulfate, 2.5 mg, Q6H PRN  calcium gluconate IVPB, 1 g, PRN  calcium gluconate IVPB, 2 g, PRN  calcium gluconate IVPB, 3 g, PRN  magnesium sulfate IVPB, 2 g, PRN  magnesium sulfate IVPB, 4 g, PRN  ondansetron, 4 mg, Q8H PRN  potassium chloride in water, 40 mEq, PRN    And  potassium chloride in water, 40 mEq, PRN    And  potassium chloride in water, 40 mEq, PRN  sodium chloride 0.9%, 10 mL, PRN  sodium phosphate IVPB, 15 mmol, PRN  sodium phosphate IVPB, 20.01 mmol, PRN  sodium phosphate IVPB, 30 mmol, PRN      Today I personally reviewed pertinent medications, lines/drains/airways, imaging, cardiology results, laboratory results, microbiology results,.    Diet  Diet NPO  Diet NPO

## 2020-09-02 NOTE — CONSULTS
Wound consult received on patient's bilateral ears.  Macerated tissue/partial thickness skin loss noted to upper posterior portion of ears, appears may be device related.  Recommend painting daily with betadine.  Dr. Josue Gao with [facility] approved recommendations.   Updated Dr. Gao with recommendations for general surgery evaluation for bilateral ischial pressure injuries.  Nursing to continue care. Wound care to follow prn.    Right ear: 1.5cm x 0.4cm x 0.1cm, macerated tissue, partial thickness skin loss, moist pink wound bed.    Left ear 2cm x 0.4cm x 0.1cm, macerated tissue, redden/light purple discoloration, partial thickness skin loss

## 2020-09-02 NOTE — PHYSICIAN QUERY
PT Name: Glen Moscoso  MR #: [id number]    Consultant Diagnosis Clarification     CDS/: Ashish Bhatia RN              Contact information: [email or web address]  This form is a permanent document in the medical record.    Query Date: September 2, 2020      By submitting this query, we are merely seeking further clarification of documentation.  Please utilize your independent clinical judgment when addressing the question(s) below.    The Medical Record reflects the following:    Clinical Information Location in Medical Record   FINDINGS:  Right central veins: The internal jugular, subclavian, and axillary veins are patent and free of thrombus.     Right arm veins: The brachial, and basilic veins are patent and compressible.  The cephalic vein is not visualized and occlusion is not entirely excluded.     Left central veins: The internal jugular, subclavian, and axillary veins are patent and free of thrombus.     Left arm veins: The brachial and cephalic veins are patent and compressible, noting that there is partial obscuration by overlying bandage.     There is a peripheral venous catheter within the left basilic vein.  There is surrounding occlusive thrombus and expansion of the vein.     Miscellaneous: N/A     Impression:     Thrombosis of the left basilic vein surrounding a peripheral venous catheter with expansion of the vein.     The right cephalic vein is not visualized and occlusion is not entirely excluded.     No other thrombus is visualized.   US Upper Extremity 9/1       Please clarify/confirm the Consultants diagnosis of __Thrombosis of the left basilic vein_______________________________:     [ x ] Diagnosis ruled in   [  ] Diagnosis ruled in, but it resolved prior to my assessment of the patient   [  ] Diagnosis ruled out   [  ] Other diagnosis (please specify): _____________________________   [  ] Clinically undetermined

## 2020-09-02 NOTE — PROGRESS NOTES
"[facility]  Adult Nutrition  Progress Note    SUMMARY       Recommendations  1. Suggest TF of Peptamen 1.5 with prebio @ 40 mL/hr   -provides pt with 1440 kcal, 65 g protein and 739 mL free water.   Additional water per MD. Hold for residuals >500 mL.  2. Add psyllium husk or lactobacillus acidophilus 2/2 diarrhea   3. Add Vitamin C, zinc and MVI to aid in wound healing   Goals: pt to receive nutrition by RD follow up  Nutrition Goal Status: goal met  Communication of RD Recs: (POC)    Reason for Assessment  Reason For Assessment: RD follow-up  Diagnosis: (Encephalopathy acute)  Relevant Medical History: Cerebral palsy; Seizures; PEG; Acid reflux  Interdisciplinary Rounds: did not attend  General Information Comments: Pt was stepped down but then readmitted to [facility] 2/2 respiratory distress. Pt nonverbal at baseline. RN reports pt tolerating TF at goal rate. NFPE -moderate malnutrition.  Nutrition Discharge Planning: unable to determine at this time    Nutrition Risk Screen  Nutrition Risk Screen: tube feeding or parenteral nutrition    Nutrition/Diet History  Food Allergies: NKFA  Factors Affecting Nutritional Intake: NPO, on mechanical ventilation  Nutrition Support Formula Prior to Admit: Other (see comments)(Ensure 4 cans/day)    Anthropometrics  Temp: (!) 101.6 °F (38.7 °C)  Height Method: Stated  Height: 4' 9" (144.8 cm)  Height (inches): 57 in  Weight Method: Bed Scale  Weight: 33.1 kg (73 lb)  Weight (lb): 73 lb  Ideal Body Weight (IBW), Male: 88 lb  % Ideal Body Weight, Male (lb): 82.95 %  BMI (Calculated): 15.8  BMI Grade: less than 16 protein-energy malnutrition grade III  Usual Body Weight (UBW), k.64 kg  % Usual Body Weight: 85.87     Lab/Procedures/Meds  Pertinent Labs Reviewed: reviewed  Pertinent Labs Comments: BUN 5, Keith 7.5, Phos 2.2, Mg 1.5, Alb 1.6  Pertinent Medications Reviewed: reviewed  Pertinent Medications Comments: lasix, lactobacillus rhamnosus, " propranolol    Physical Findings/Assessment  Jerry score 12     Estimated/Assessed Needs  Weight Used For Calorie Calculations: 33.1 kg (72 lb 15.6 oz)  Energy Calorie Requirements (kcal): 1412 kcal/day  Energy Need Method: [place]-St Jeor(x 1.25)  Protein Requirements: 53-66 gm/day  Weight Used For Protein Calculations: 33.1 kg (72 lb 15.6 oz)  Fluid Requirements (mL): 1 mL/kcal or per MD  RDA Method (mL): 1412    Nutrition Prescription Ordered  Current Diet Order: NPO  Current Nutrition Support Formula Ordered: Peptamen 1.5 w/Prebio  Current Nutrition Support Rate Ordered: 40 (ml)    Evaluation of Received Nutrient/Fluid Intake  Enteral Calories (kcal): 1440  Enteral Protein (gm): 65  Enteral (Free Water) Fluid (mL): 741  Free Water Flush Fluid (mL): 150  % Kcal Needs: 102  % Protein Needs: 100  Energy Calories Required:meeting needs  Protein Required: meeting needs  Comments: LBM 9/2  Tolerance: tolerating  % Intake of Estimated Energy Needs: 75 - 100 %  % Meal Intake: NPO    Nutrition Risk  Level of Risk/Frequency of Follow-up: moderate - high     Assessment and Plan    Nutrition Problem:  Moderate Protein-Calorie Malnutrition  Malnutrition in the context of Acute Illness/Injury     Related to (etiology):  Increased nutrient needs     Signs and Symptoms (as evidenced by):  Energy Intake: <75% of estimated energy requirement for >2 weeks  Body Fat Depletion: moderate depletion of orbitals and triceps   Muscle Mass Depletion: moderate and severe depletion of temples, clavicle region, interosseous muscle and lower extremities   Weight Loss: 14% x >2 weeks      Interventions (treatment strategy):  Collaboration of care with other providers  Enteral nutrition     Nutrition Diagnosis Status:  Continues     Monitor and Evaluation  Food and Nutrient Intake: energy intake, enteral nutrition intake  Food and Nutrient Adminstration: enteral and parenteral nutrition administration  Anthropometric Measurements: weight,  weight change  Biochemical Data, Medical Tests and Procedures: electrolyte and renal panel, gastrointestinal profile, glucose/endocrine profile, inflammatory profile, lipid profile  Nutrition-Focused Physical Findings: overall appearance     Malnutrition Assessment  Malnutrition Type: acute illness or injury  Weight Loss (Malnutrition): (14 % x >2 weeks)  Subcutaneous Fat (Malnutrition): moderate depletion  Muscle Mass (Malnutrition): moderate depletion   Orbital Region (Subcutaneous Fat Loss): moderate depletion  Upper Arm Region (Subcutaneous Fat Loss): moderate depletion   [place] Region (Muscle Loss): severe depletion  Clavicle Bone Region (Muscle Loss): severe depletion  Clavicle and Acromion Bone Region (Muscle Loss): severe depletion  Dorsal Hand (Muscle Loss): moderate depletion  Patellar Region (Muscle Loss): moderate depletion  Anterior Thigh Region (Muscle Loss): severe depletion  Posterior Calf Region (Muscle Loss): severe depletion     Nutrition Follow-Up  RD Follow-up?: Yes

## 2020-09-02 NOTE — ASSESSMENT & PLAN NOTE
-- At baseline he is confused but able to interact and unable to walk  -- Initially found unresponsive prior to coming to the unit the first time recently  -- EEG does not show any epileptiform activity   -- Encephalopathy was most likely due to a combination of dehydration and pneumonia  -- Continue same AEDs  -- Patient is at neurological baseline

## 2020-09-02 NOTE — PLAN OF CARE
POC reviewed with pt at 0400. Pt unable to verbalize understanding due to nonverbal baseline state. Mom at bedside and verbalized understanding. Questions and concerns addressed. No acute events overnight.     Pt remained afebrile.  Tolerating Tfs at goal.   Dressings changed, wound care done.  Half of propanolol dose given due to low BP.  BM x 1 overnight.     Pt progressing toward goals. Will continue to monitor. See flowsheets for full assessment and VS info

## 2020-09-02 NOTE — RESIDENT HANDOFF
Handoff     Primary Team: Networked reference to record [facility]  Room Number: 9074/9074 A     Patient Name: Glen Moscoso MRN: [id number]     Date of Birth: [id number] Allergies: Patient has no known allergies.     Age: 24 y.o. Admit Date: 8/26/2020     Sex: male  BMI: Body mass index is 15.8 kg/m².     Code Status: Full Code        Illness Level (current clinical status): Watcher - No    Reason for Admission: Acute respiratory failure with hypoxia    Brief HPI (pertinent PMH and diagnosis or differential diagnosis):    Glen Moscoso is a 25 yo M with a PMH of CP, Seizures, and recent admission for hyponatremia who presented to [facility] for encephalopathy, sepsis, diarrhea, and possible seizure. He presented to OSH after being found unresponsive and in resp distress. He was found down by his mother upon EMS arrival he had agonal resp 3-4 per min, he was intubated by EMS and take to OSH ED. He was hypotensive on arrival started on Levo gtt, received antibiotics, ABG showed metabolic acidosis. He was admitted to [facility] for a higher level of care, EGG and close monitoring. Patient was stepped down to the floor and then readmitted to [facility] after 2 rapid response calls due to respiratory distress.      Hospital Course (updated, brief assessment by system or problem, significant events):     Patient was admitted to [facility] 8/22, blood cultures drawn, put on abx and EEG, and levo. Patient was extubated 8/27 and started on tube feeds. EEG did not show any epileptiform activity or discharges. Patient with diarrhea but C. Diff was negative. Procal elevated and respiratory culture showed serratia marcesens and psedudomonas. Patient was already on zosyn. At this time 8/28 patient was stable with no ICU needs and back to his neuro baseline per mother. 8/29 Zosyn was narrowed to cipro for PNA. Patient was back on ICU service after 2 rapids were called for respiratory distress. Left basilic vein thrombosis around line which will be removed today (9/2). Is  again now stable on RA. Patient has completed antibiotics and remains afebrile.    Tasks (specific, using if-then statements):   -- Monitor respiratory status  -- Suctioning q4 (q2 hours if possible) for secretions    Contingency Plan (special circumstances anticipated and plan): If patient's respiratory status worsens or if there are any significant changes, please reach out.    Estimated Discharge Date: 9/4/2020    Discharge Disposition: Home or Self Care    Mentored By: Dr. Marina

## 2020-09-02 NOTE — PLAN OF CARE
Glen continues needing frequent suctioning.  No desats today and remains on room air.  Pupils equal and reactive.  Nonverbal but laughing and acting more at baseline per mom.  Tolerating PEG tube feeds at goal without emesis.  One small bowel movement today.  Good urine output.  Midline removed, cath intact, no signs/symptoms of infection noted.  New PIV placed by line team.  Awaiting transfer to the floor when a bed opens up.  Will continue to monitor closely.  Mom at bedside and active in cares.

## 2020-09-02 NOTE — PROGRESS NOTES
[facility]  Neurocritical Care  Progress Note    Admit Date: 8/26/2020  Service Date: 09/02/2020  Length of Stay: 7    Subjective:     Chief Complaint: Acute respiratory failure with hypoxia    History of Present Illness: Glen Moscoso is a 23 yo M with a PMH of CP, Seizures, and recent admission for hyponatremia who presents to [facility] for encephalopathy, sepsis, diarrhea, and possible seizure. He presented to OSH after being found unresponsive and in  Resp distress. He was found down by his mother upon EMS arrival he had agonal resp 3-4 per min, he was intubated by EMS and take to OSH ED. He was hypotensive on arrival started on Levo gtt, received antibiotics, ABG showed metabolic acidosis. He is being admitted to [facility] for a higher level of care, EGG and close monitoring.     Hospital Course: 8/26: Admit NCC: BC, antibiotics, EEG, Levo  08/27/2020 Extubated on 8/27/20. Tube feeds were started. Awaiting Urine culture. Patient had multiple episodes of diarrhea and had an extensive antibiotic history before admission, so will collect C diff. EEG does not show any epileptiform activity or discharge. Patient extubated and started on tube feeds.   08/28/2020 Procal was elevated. Infectious workup sent which showed his c diff, and urine culture being negative. His respiratory culture showing serratia marcesens as well as pseudomonas. Patient was already started on zosyn. Per mother he is back to his baseline and does not have any ICU needs.   8/29/2020: Procal back down, d/c'd NS with KCl d/t Na increase, started enteric water flushes 150cc q6h and one time bolus 250cc, d/c'd zosyn and narrowed to cipro for PNA  8/30/2020:   8/31/2020: Started on tube feeds. Chest physiotherapy and cough assist  9/1/2020: Still with coarse breath sounds, holding water flushes until Na stable. Upper/lower DVT scan (screening).  9/2/2020 Left basilic vein thrombosis around midline, will be removed today. Was given half  propanolol dose overnight d/t low BP. Now on RA. Stable and ready for step down.    No new subjective & objective note has been filed under this hospital service since the last note was generated.    Assessment/Plan:     Neuro  Encephalopathy acute  -- At baseline he is confused but able to interact and unable to walk  -- Initially found unresponsive prior to coming to the unit the first time recently  -- EEG does not show any epileptiform activity   -- Encephalopathy was most likely due to a combination of dehydration and pneumonia  -- Continue same AEDs  -- Patient is at neurological baseline    Seizure disorder  -- EEG done previously while in unit did not show any electrographic seizures    Cerebral palsy  -- Patient is wheelchair and bed bound   -- Minimal interaction with surroundings at baseline  -- Mother is care giver  -- Upper/lower DVT scan reveals left basilic vein thrombosis around line. Will remove today.      Derm  Decubitus ulcer of left buttock, stage 2  -- Wound care following    Pulmonary  * Acute respiratory failure with hypoxia  -- Requires frequent suction q4, or q2 if possible    Pneumonia due to Pseudomonas aerginuosa and Serratia marcesens  -- WBC 9.52  -- BCx x2, UA negative  -- Sent C diff due to multiple episodes of diarrhea which was negative   -- Respiratory cultures are positive --> please see PNA for further plan and assessment  -- Requires frequent suction q4, or q2 if possible    Renal/  Hypernatremia  Resolved  -- Hold water flushes, Na trending down    Hypokalemia  Resolved    ID  Bacterial infection due to Serratia  -- Continue cipro  -- Chest physiotherapy  -- Cough assist  -- Chest x-ray looks unremarkable    Endocrine  Severe protein-calorie malnutrition  -- Continue tube feeds    Orthopedic  Deep tissue injury  -- R buttock   -- Wound care following    Other  Biphasic stridor  -- In the past, stridor noted to improve with jaw thrust maneuver -> likely mechanical obstruction  combined with inability to effectively clear secretions  -- No stridor appreciated at this time, will continue to monitor        The patient is being Prophylaxed for:  Venous Thromboembolism with: Chemical  Stress Ulcer with: None  Ventilator Pneumonia with: not applicable    Activity Orders          Diet NPO: NPO starting at 08/26 0159        Full Code    Josue Gao MD  Neurocritical Care  [facility]

## 2020-09-02 NOTE — PLAN OF CARE
Recommendations  1. Suggest TF of Peptamen 1.5 with prebio @ 40 mL/hr   -provides pt with 1440 kcal, 65 g protein and 739 mL free water.   Additional water per MD. Hold for residuals >500 mL.  2. Add psyllium husk or lactobacillus acidophilus 2/2 diarrhea   3. Add Vitamin C, zinc and MVI to aid in wound healing   Goals: pt to receive nutrition by RD follow up  Nutrition Goal Status: goal met

## 2020-09-03 PROBLEM — R50.9 FEVER: Status: ACTIVE | Noted: 2020-09-03

## 2020-09-03 LAB
ALBUMIN SERPL BCP-MCNC: 1.5 G/DL (ref 3.5–5.2)
ALP SERPL-CCNC: 110 U/L (ref 55–135)
ALT SERPL W/O P-5'-P-CCNC: 17 U/L (ref 10–44)
ANION GAP SERPL CALC-SCNC: 6 MMOL/L (ref 8–16)
AST SERPL-CCNC: 24 U/L (ref 10–40)
BACTERIA SPEC AEROBE CULT: ABNORMAL
BACTERIA SPEC AEROBE CULT: ABNORMAL
BASOPHILS # BLD AUTO: 0.02 K/UL (ref 0–0.2)
BASOPHILS NFR BLD: 0.2 % (ref 0–1.9)
BILIRUB SERPL-MCNC: 0.1 MG/DL (ref 0.1–1)
BUN SERPL-MCNC: 6 MG/DL (ref 6–20)
C DIFF GDH STL QL: NEGATIVE
C DIFF TOX A+B STL QL IA: NEGATIVE
CALCIUM SERPL-MCNC: 7.8 MG/DL (ref 8.7–10.5)
CHLORIDE SERPL-SCNC: 104 MMOL/L (ref 95–110)
CO2 SERPL-SCNC: 28 MMOL/L (ref 23–29)
CREAT SERPL-MCNC: 0.5 MG/DL (ref 0.5–1.4)
DIFFERENTIAL METHOD: ABNORMAL
EOSINOPHIL # BLD AUTO: 0 K/UL (ref 0–0.5)
EOSINOPHIL NFR BLD: 0 % (ref 0–8)
ERYTHROCYTE [DISTWIDTH] IN BLOOD BY AUTOMATED COUNT: 17.3 % (ref 11.5–14.5)
EST. GFR  (AFRICAN AMERICAN): >60 ML/MIN/1.73 M^2
EST. GFR  (NON AFRICAN AMERICAN): >60 ML/MIN/1.73 M^2
GLUCOSE SERPL-MCNC: 99 MG/DL (ref 70–110)
GRAM STN SPEC: ABNORMAL
HCT VFR BLD AUTO: 25.7 % (ref 40–54)
HGB BLD-MCNC: 7.8 G/DL (ref 14–18)
IMM GRANULOCYTES # BLD AUTO: 0.04 K/UL (ref 0–0.04)
IMM GRANULOCYTES NFR BLD AUTO: 0.4 % (ref 0–0.5)
LYMPHOCYTES # BLD AUTO: 1.4 K/UL (ref 1–4.8)
LYMPHOCYTES NFR BLD: 14 % (ref 18–48)
MAGNESIUM SERPL-MCNC: 1.7 MG/DL (ref 1.6–2.6)
MCH RBC QN AUTO: 29.2 PG (ref 27–31)
MCHC RBC AUTO-ENTMCNC: 30.4 G/DL (ref 32–36)
MCV RBC AUTO: 96 FL (ref 82–98)
MONOCYTES # BLD AUTO: 0.8 K/UL (ref 0.3–1)
MONOCYTES NFR BLD: 8.4 % (ref 4–15)
NEUTROPHILS # BLD AUTO: 7.6 K/UL (ref 1.8–7.7)
NEUTROPHILS NFR BLD: 77 % (ref 38–73)
NRBC BLD-RTO: 0 /100 WBC
PHOSPHATE SERPL-MCNC: 2.2 MG/DL (ref 2.7–4.5)
PLATELET # BLD AUTO: 408 K/UL (ref 150–350)
PMV BLD AUTO: 10 FL (ref 9.2–12.9)
POTASSIUM SERPL-SCNC: 3.8 MMOL/L (ref 3.5–5.1)
PROT SERPL-MCNC: 5.6 G/DL (ref 6–8.4)
RBC # BLD AUTO: 2.67 M/UL (ref 4.6–6.2)
SODIUM SERPL-SCNC: 138 MMOL/L (ref 136–145)
WBC # BLD AUTO: 9.85 K/UL (ref 3.9–12.7)

## 2020-09-03 PROCEDURE — 80053 COMPREHEN METABOLIC PANEL: CPT

## 2020-09-03 PROCEDURE — 25000003 PHARM REV CODE 250: Performed by: STUDENT IN AN ORGANIZED HEALTH CARE EDUCATION/TRAINING PROGRAM

## 2020-09-03 PROCEDURE — 99233 PR SUBSEQUENT HOSPITAL CARE,LEVL III: ICD-10-PCS | Mod: ,,, | Performed by: PHYSICIAN ASSISTANT

## 2020-09-03 PROCEDURE — 63600175 PHARM REV CODE 636 W HCPCS: Performed by: HOSPITALIST

## 2020-09-03 PROCEDURE — 99233 SBSQ HOSP IP/OBS HIGH 50: CPT | Mod: ,,, | Performed by: HOSPITALIST

## 2020-09-03 PROCEDURE — 85025 COMPLETE CBC W/AUTO DIFF WBC: CPT

## 2020-09-03 PROCEDURE — 63600175 PHARM REV CODE 636 W HCPCS: Performed by: STUDENT IN AN ORGANIZED HEALTH CARE EDUCATION/TRAINING PROGRAM

## 2020-09-03 PROCEDURE — 87449 NOS EACH ORGANISM AG IA: CPT

## 2020-09-03 PROCEDURE — 25000003 PHARM REV CODE 250: Performed by: PHYSICIAN ASSISTANT

## 2020-09-03 PROCEDURE — 94761 N-INVAS EAR/PLS OXIMETRY MLT: CPT

## 2020-09-03 PROCEDURE — 83735 ASSAY OF MAGNESIUM: CPT

## 2020-09-03 PROCEDURE — 25000003 PHARM REV CODE 250: Performed by: HOSPITALIST

## 2020-09-03 PROCEDURE — 11000001 HC ACUTE MED/SURG PRIVATE ROOM

## 2020-09-03 PROCEDURE — 87324 CLOSTRIDIUM AG IA: CPT

## 2020-09-03 PROCEDURE — 94668 MNPJ CHEST WALL SBSQ: CPT

## 2020-09-03 PROCEDURE — 84100 ASSAY OF PHOSPHORUS: CPT

## 2020-09-03 PROCEDURE — 99900026 HC AIRWAY MAINTENANCE (STAT)

## 2020-09-03 PROCEDURE — 99900035 HC TECH TIME PER 15 MIN (STAT)

## 2020-09-03 PROCEDURE — 99233 SBSQ HOSP IP/OBS HIGH 50: CPT | Mod: ,,, | Performed by: PHYSICIAN ASSISTANT

## 2020-09-03 PROCEDURE — 36415 COLL VENOUS BLD VENIPUNCTURE: CPT

## 2020-09-03 PROCEDURE — 99233 PR SUBSEQUENT HOSPITAL CARE,LEVL III: ICD-10-PCS | Mod: ,,, | Performed by: HOSPITALIST

## 2020-09-03 RX ORDER — SODIUM,POTASSIUM PHOSPHATES 280-250MG
1 POWDER IN PACKET (EA) ORAL
Status: DISCONTINUED | OUTPATIENT
Start: 2020-09-03 | End: 2020-09-06

## 2020-09-03 RX ADMIN — PHENOBARBITAL 100 MG: 20 ELIXIR ORAL at 09:09

## 2020-09-03 RX ADMIN — Medication 1 CAPSULE: at 08:09

## 2020-09-03 RX ADMIN — BACLOFEN 10 MG: 10 TABLET ORAL at 04:09

## 2020-09-03 RX ADMIN — POTASSIUM & SODIUM PHOSPHATES POWDER PACK 280-160-250 MG 1 PACKET: 280-160-250 PACK at 04:09

## 2020-09-03 RX ADMIN — BACLOFEN 10 MG: 10 TABLET ORAL at 08:09

## 2020-09-03 RX ADMIN — POTASSIUM & SODIUM PHOSPHATES POWDER PACK 280-160-250 MG 1 PACKET: 280-160-250 PACK at 12:09

## 2020-09-03 RX ADMIN — PROPRANOLOL HYDROCHLORIDE 20 MG: 20 TABLET ORAL at 04:09

## 2020-09-03 RX ADMIN — PROPRANOLOL HYDROCHLORIDE 20 MG: 20 TABLET ORAL at 09:09

## 2020-09-03 RX ADMIN — HEPARIN SODIUM 5000 UNITS: 5000 INJECTION INTRAVENOUS; SUBCUTANEOUS at 08:09

## 2020-09-03 RX ADMIN — POTASSIUM & SODIUM PHOSPHATES POWDER PACK 280-160-250 MG 1 PACKET: 280-160-250 PACK at 09:09

## 2020-09-03 RX ADMIN — PROPRANOLOL HYDROCHLORIDE 20 MG: 20 TABLET ORAL at 08:09

## 2020-09-03 RX ADMIN — PIPERACILLIN SODIUM AND TAZOBACTAM SODIUM 4.5 G: 4; .5 INJECTION, POWDER, LYOPHILIZED, FOR SOLUTION INTRAVENOUS at 04:09

## 2020-09-03 RX ADMIN — PIPERACILLIN SODIUM AND TAZOBACTAM SODIUM 4.5 G: 4; .5 INJECTION, POWDER, LYOPHILIZED, FOR SOLUTION INTRAVENOUS at 08:09

## 2020-09-03 RX ADMIN — HEPARIN SODIUM 5000 UNITS: 5000 INJECTION INTRAVENOUS; SUBCUTANEOUS at 09:09

## 2020-09-03 RX ADMIN — BACLOFEN 10 MG: 10 TABLET ORAL at 09:09

## 2020-09-03 NOTE — CARE UPDATE
Rapid Response Nurse Chart Check     Chart check completed, abnormal VS noted. Febrile at 101.2. Tylenol given per orders. Other VSS at this time per flow sheet data. Please call [phone] for further concerns or assistance.

## 2020-09-03 NOTE — PLAN OF CARE
Problem: Adult Inpatient Plan of Care  Goal: Plan of Care Review  Outcome: Ongoing, Progressing  Goal: Optimal Comfort and Wellbeing  Outcome: Ongoing, Progressing     Problem: Skin and Tissue Injury (Mechanical Ventilation, Invasive)  Goal: Absence of Device-Related Skin and Tissue Injury  Outcome: Ongoing, Progressing     Problem: Skin and Tissue Injury (Artificial Airway)  Goal: Absence of Device-Related Skin or Tissue Injury  Outcome: Ongoing, Progressing     Problem: Fall Injury Risk  Goal: Absence of Fall and Fall-Related Injury  Outcome: Ongoing, Progressing

## 2020-09-03 NOTE — ASSESSMENT & PLAN NOTE
24 year old male with history of cerebral palsy and seizures who presents with encephalopathy, diarrhea and respiratory distress initially requiring intubation. EEG negative for seizures. CXR clear. WBC 22 K on admit.     Blood/urine cx negative. Resp cx grew Pseudomonas and Serratia. He was started on broad spectrum abx and IVF on admit with improvement in mentation and extubated 8/27. ABx deescalated to Cipro on 8/29. Since then he has been having fevers.    He was found to have left basilic vein thrombosis around midline and it was removed. Also with bilateral necrotic ischial ulcerations with fluid collection within the lateral aspect of the right lower extremity at the level of the hip.    Repeat blood cx are sterile. Repeat resp cx with Serratia (now quinolone R). Still with diarrhea. T max 101.2. he is hypoxic and tachycardic. ID consulted for abx recs.    Plan  - Continue IV Zosyn  - Recommend CT C/A/P with contrast to further evaluate his lungs and to ensure no underlying ischial fluid collections/bony erosion. Although he is on anticoagluation, PE is in differential.   - Send stool for C. Diff.   - General surgery has been consulted for debridement of his wounds.   - ID will follow closely.

## 2020-09-03 NOTE — CONSULTS
[facility]  Infectious Disease  Consult Note    Patient Name: Glen Moscoso  MRN: [id number]  Admission Date: 8/26/2020  Hospital Length of Stay: 8 days  Attending Physician: Quiana Armenta MD  Primary Care Provider: Yadi Lawson MD     Isolation Status: Special Contact    Patient information was obtained from past medical records.      Inpatient consult to Infectious Diseases  Consult performed by: Lynne Austin PA-C  Consult ordered by: Quiana Armenta MD        Assessment/Plan:     Decubitus ulcer of left buttock, stage 2     See below    Pneumonia due to Pseudomonas aerginuosa and Serratia marcesens     See below    Fever     24 year old male with history of cerebral palsy and seizures who presents with encephalopathy, diarrhea and respiratory distress initially requiring intubation. EEG negative for seizures. CXR clear. WBC 22 K on admit.     Blood/urine cx negative. Resp cx grew Pseudomonas and Serratia. He was started on broad spectrum abx and IVF on admit with improvement in mentation and extubated 8/27. ABx deescalated to Cipro on 8/29. Since then he has been having fevers.    He was found to have left basilic vein thrombosis around midline and it was removed. Also with bilateral necrotic ischial ulcerations with RLE fluid collection at the level of the hip incidentally found on US.    Repeat blood cx are sterile. Repeat resp cx with Serratia (now quinolone R). Still with diarrhea. T max 101.2. he is hypoxic and tachycardic. ID consulted for abx recs.    Plan  - Continue IV Zosyn  - Recommend CT C/A/P with contrast to further evaluate his lungs and to ensure no underlying ischial fluid collections/bony erosion. Although he is on anticoagluation, PE is in differential.   - Send stool for C. Diff.   - General surgery has been consulted for debridement of his wounds.   - ID will follow closely.           Thank you for the consult. Please call for any questions.  Lynne  TOM Austin  Phone: [phone]  Pager: [phone]    Subjective:     Principal Problem: Acute respiratory failure with hypoxia    HPI: Glen Moscoso is a 24 year old male with history of cerebral palsy, seizures, and recent admission for hyponatremia who presents with encephalopathy, respiratory distress and possible seizure. He presented to OSH after being found unresponsive and in resp distress. He was found down by his mother upon EMS arrival he had agonal resp 3-4 per min, he was intubated by EMS and take to OSH ED. He was hypotensive on arrival started on Levo gtt, ABG showed metabolic acidosis. WBC 22K. CXR clear. He was admitted to [facility] on 8/26 for a higher level of care, EGG and close monitoring. Started on empiric Vanc and Zosyn and IVF.     On 8/27 he was extubated. Tube feeds, chest physiotherapy were started. Blood cx and urine cx returned negative. Resp cx grew Serratia and Pseudomonas. C diff testing negative.  EEG did not show any epileptiform activity or discharge. He improved with fluids and abx and returned back to baseline per his mother. Suspect AMS due to dehydration and infection. Leukocytosis resolved. ABx deescalated to Cipro on 8/29. Since then he has been having fevers. He was found to have Left basilic vein thrombosis around midline and it was removed. He completed Cipro on 9/1. Stepped down to the floor yesterday. Given ongoing fevers blood cx repeated and are negative. Sputum cx 8/31 is showing Serratia again. Zosyn started. ID consulted for abx recs. He is on RA. O2 sats in the low 90s. He has multiple necrotic decubitus ulcerations. General surgery consulted. Wound care following.    Of note, he was recently admitted 8/4 - 8/18 for fever, diarrhea and cough. Per brief chart review,  No clear cause of fevers but in the prior two months he has been innudated with antibiotics. Cultures returned negative. Tx with Vanc and Levofloxacin for PNA. Also treated empirically for C diff with oral flagyl  and vanc. Symptoms improved prior to discharge. Fever and leukocytosis resolved.    Past Medical History:   Diagnosis Date    Acid reflux     Cerebral palsy     History of hip surgery     S/P percutaneous endoscopic gastrostomy (PEG) tube placement     Seizures        History reviewed. No pertinent surgical history.    Review of patient's allergies indicates:  No Known Allergies    Medications:  Medications Prior to Admission   Medication Sig    baclofen (LIORESAL) 10 MG tablet Take 10 mg by mouth 3 (three) times daily.    cholestyramine (QUESTRAN) 4 gram packet Take 1 packet (4 g total) by mouth 2 (two) times daily.    diphenhydrAMINE (BENADRYL) 12.5 mg/5 mL elixir Take by mouth 4 (four) times daily as needed for Allergies.    gabapentin (NEURONTIN) 250 mg/5 mL solution Take by mouth nightly. At bedtime    ibuprofen (ADVIL,MOTRIN) 100 mg/5 mL suspension Take by mouth every 6 (six) hours as needed for Temperature greater than.    Lactobacillus rhamnosus GG (CULTURELLE) 10 billion cell capsule 1 capsule by Per G Tube route once daily.    nystatin (MYCOSTATIN) cream Apply topically 2 (two) times daily.    PHENobarbitaL 20 mg/5 mL (4 mg/mL) Elix elixir Take 25 mLs (100 mg total) by mouth every evening.    propranoloL (INDERAL) 20 MG tablet Take 1 tablet (20 mg total) by mouth 3 (three) times daily.     Antibiotics (From admission, onward)    Start     Stop Route Frequency Ordered    09/03/20 0845  piperacillin-tazobactam 4.5 g in sodium chloride 0.9% 100 mL IVPB (ready to mix system)      -- IV Every 8 hours (non-standard times) 09/03/20 0731        Antifungals (From admission, onward)    None        Antivirals (From admission, onward)    None             There is no immunization history on file for this patient.    Family History     None        Social History     Socioeconomic History    Marital status: Single     Spouse name: Not on file    Number of children: Not on file    Years of education: Not  on file    Highest education level: Not on file   Occupational History    Not on file   Social Needs    Financial resource strain: Not on file    Food insecurity     Worry: Not on file     Inability: Not on file    Transportation needs     Medical: Not on file     Non-medical: Not on file   Tobacco Use    Smoking status: Never Smoker    Smokeless tobacco: Never Used   Substance and Sexual Activity    Alcohol use: Never     Frequency: Never    Drug use: Not Currently    Sexual activity: Not on file   Lifestyle    Physical activity     Days per week: Not on file     Minutes per session: Not on file    Stress: Not on file   Relationships    Social connections     Talks on phone: Not on file     Gets together: Not on file     Attends Hindu service: Not on file     Active member of club or organization: Not on file     Attends meetings of clubs or organizations: Not on file     Relationship status: Not on file   Other Topics Concern    Not on file   Social History Narrative    Not on file     Review of Systems   Unable to perform ROS: Patient nonverbal     Objective:     Vital Signs (Most Recent):  Temp: 99.8 °F (37.7 °C) (09/03/20 0730)  Pulse: (!) 113 (09/03/20 1100)  Resp: 18 (09/03/20 0730)  BP: 114/62 (09/03/20 0730)  SpO2: 95 % (09/03/20 0730) Vital Signs (24h Range):  Temp:  [99.5 °F (37.5 °C)-101.2 °F (38.4 °C)] 99.8 °F (37.7 °C)  Pulse:  [100-127] 113  Resp:  [18-33] 18  SpO2:  [91 %-98 %] 95 %  BP: (107-130)/(58-84) 114/62     Weight: 33.1 kg (73 lb)  Body mass index is 15.8 kg/m².    Estimated Creatinine Clearance: 108.5 mL/min (based on SCr of 0.5 mg/dL).    Physical Exam  Vitals signs and nursing note reviewed.   Constitutional:       General: He is not in acute distress.     Appearance: He is not diaphoretic.   HENT:      Head: Atraumatic.      Nose: Nose normal.      Mouth/Throat:      Mouth: Mucous membranes are dry.      Comments: Dry cracked lips  Eyes:      General: No scleral  icterus.     Conjunctiva/sclera: Conjunctivae normal.   Cardiovascular:      Rate and Rhythm: Regular rhythm. Tachycardia present.      Heart sounds: Normal heart sounds.   Pulmonary:      Effort: Pulmonary effort is normal. No respiratory distress.      Comments: Course breath sounds bilaterally  RA  Abdominal:      General: There is no distension.      Palpations: Abdomen is soft.   Musculoskeletal:      Right lower leg: No edema.      Left lower leg: No edema.   Skin:     Findings: Lesion present. No rash.      Comments: Necrotic ischial decubitus ulcerations   Neurological:      Mental Status: He is unresponsive.                 Significant Labs:   Bilirubin:   Recent Labs   Lab 08/30/20  0638 08/31/20  0213 09/01/20  0045 09/02/20  0252 09/03/20  0809   BILITOT 0.1 0.2 0.1 0.2 0.1     BMP:   Recent Labs   Lab 09/03/20  0809   GLU 99      K 3.8      CO2 28   BUN 6   CREATININE 0.5   CALCIUM 7.8*   MG 1.7     CBC:   Recent Labs   Lab 09/02/20  0252 09/03/20  0809   WBC 9.08 9.85   HGB 7.8* 7.8*   HCT 25.4* 25.7*   * 408*     CMP:   Recent Labs   Lab 09/02/20  0252 09/03/20  0809    138   K 4.3 3.8    104   CO2 26 28   GLU 90 99   BUN 5* 6   CREATININE 0.5 0.5   CALCIUM 7.5* 7.8*   PROT 5.5* 5.6*   ALBUMIN 1.6* 1.5*   BILITOT 0.2 0.1   ALKPHOS 102 110   AST 29 24   ALT 15 17   ANIONGAP 6* 6*   EGFRNONAA >60.0 >60.0     Fungus Culture (Blood or Bone Marrow): No results for input(s): FUNGUSCULTUR in the last 4320 hours.  Lactic Acid: No results for input(s): LACTATE in the last 48 hours.  Procalcitonin: No results for input(s): PROCAL in the last 48 hours.  Quantiferon: No results for input(s): NIL, TBAG, TBAGNIL, MITOGENNIL, TBGOLD in the last 48 hours.  Respiratory Culture:   Recent Labs   Lab 08/26/20  0505 08/31/20  2157   GSRESP <10 epithelial cells per low power field.  Moderate WBC's  Many Gram negative rods  Many Gram negative diplococci <10 epithelial cells per low power  field.  Rare WBC's  Rare Gram negative rods   RESPIRATORYC No S aureus isolated.  SERRATIA MARCESCENS  Moderate  Normal respiratory jose also present  *  PSEUDOMONAS AERUGINOSA  Moderate  * No S aureus or Pseudomonas isolated.  SERRATIA MARCESCENS  Moderate  *     Urine Culture:   Recent Labs   Lab 08/26/20  0915   LABURIN No significant growth     Urine Studies:   Recent Labs   Lab 08/09/20  0555 08/26/20  0915   COLORU Yellow Vane   APPEARANCEUA Hazy* Cloudy*   PHUR 6.0 5.0   SPECGRAV >=1.030* 1.025   PROTEINUA 1+* 1+*   GLUCUA Negative Negative   KETONESU Negative Negative   BILIRUBINUA Negative Negative   OCCULTUA 3+* 2+*   NITRITE Negative Negative   UROBILINOGEN Negative  --    LEUKOCYTESUR Negative Trace*   RBCUA 40* 13*   WBCUA 8* 18*   BACTERIA Occasional Occasional   SQUAMEPITHEL  --  1   HYALINECASTS 2* 7*     Wound Culture: No results for input(s): LABAERO in the last 4320 hours.  All pertinent labs within the past 24 hours have been reviewed.    Significant Imaging: I have reviewed all pertinent imaging results/findings within the past 24 hours.   US Lower Extremity Veins Bilateral [[id number]] Resulted: 09/01/20 1204   Order Status: Completed Updated: 09/01/20 1206   Narrative:     EXAMINATION:   US LOWER EXTREMITY VEINS BILATERAL     CLINICAL HISTORY:   r/o DVTs;     TECHNIQUE:   Duplex and color flow Doppler and dynamic compression was performed of the bilateral lower extremity veins was performed.     COMPARISON:   None     FINDINGS:   Right thigh veins: The common femoral, femoral, popliteal, upper greater saphenous, and deep femoral veins are patent and free of thrombus. The veins are normally compressible and have normal phasic flow and augmentation response.     Right calf veins: The visualized calf veins are patent.     Left thigh veins: The common femoral, femoral, popliteal, upper greater saphenous, and deep femoral veins are patent and free of thrombus. The veins are normally  compressible and have normal phasic flow and augmentation response.     Left calf veins: The visualized calf veins are patent.     There is a heterogeneous collection within the right lateral leg measuring 3.8 x 1.7 x 3.0 cm.  No drainable collection.    Impression:       No evidence of deep venous thrombosis in either lower extremity.     Heterogeneous collection within the lateral aspect of the right lower extremity at the level of the hip, which may represent a small hematoma.  No focal drainable fluid collection.     Electronically signed by resident: Erika Baxter   Date: 09/01/2020   Time: 11:57     Electronically signed by: Reji Ray MD   Date: 09/01/2020   Time: 12:04   US Upper Extremity Veins Right [[id number]] (Abnormal) Resulted: 09/01/20 1202   Order Status: Completed Updated: 09/01/20 1205   Narrative:     EXAMINATION:   US UPPER EXTREMITY VEINS LEFT; US UPPER EXTREMITY VEINS RIGHT     CLINICAL HISTORY:   r/o dvts;; ? dvt;     TECHNIQUE:   Duplex and color flow Doppler evaluation and dynamic compression was performed of the right and left upper extremity veins.     COMPARISON:   None     FINDINGS:   Right central veins: The internal jugular, subclavian, and axillary veins are patent and free of thrombus.     Right arm veins: The brachial, and basilic veins are patent and compressible.  The cephalic vein is not visualized and occlusion is not entirely excluded.     Left central veins: The internal jugular, subclavian, and axillary veins are patent and free of thrombus.     Left arm veins: The brachial and cephalic veins are patent and compressible, noting that there is partial obscuration by overlying bandage.     There is a peripheral venous catheter within the left basilic vein.  There is surrounding occlusive thrombus and expansion of the vein.     Miscellaneous: N/A    Impression:       Thrombosis of the left basilic vein surrounding a peripheral venous catheter with expansion of the vein.      The right cephalic vein is not visualized and occlusion is not entirely excluded.     No other thrombus is visualized.     This report was flagged in Epic as abnormal.     Electronically signed by resident: Erika Baxter   Date: 09/01/2020   Time: 11:49     Electronically signed by: Reji Ray MD   Date: 09/01/2020   Time: 12:02   US Upper Extremity Veins Left [[id number]] (Abnormal) Resulted: 09/01/20 1202   Order Status: Completed Updated: 09/01/20 1205   Narrative:     EXAMINATION:   US UPPER EXTREMITY VEINS LEFT; US UPPER EXTREMITY VEINS RIGHT     CLINICAL HISTORY:   r/o dvts;; ? dvt;     TECHNIQUE:   Duplex and color flow Doppler evaluation and dynamic compression was performed of the right and left upper extremity veins.     COMPARISON:   None     FINDINGS:   Right central veins: The internal jugular, subclavian, and axillary veins are patent and free of thrombus.     Right arm veins: The brachial, and basilic veins are patent and compressible.  The cephalic vein is not visualized and occlusion is not entirely excluded.     Left central veins: The internal jugular, subclavian, and axillary veins are patent and free of thrombus.     Left arm veins: The brachial and cephalic veins are patent and compressible, noting that there is partial obscuration by overlying bandage.     There is a peripheral venous catheter within the left basilic vein.  There is surrounding occlusive thrombus and expansion of the vein.     Miscellaneous: N/A    Impression:       Thrombosis of the left basilic vein surrounding a peripheral venous catheter with expansion of the vein.     The right cephalic vein is not visualized and occlusion is not entirely excluded.     No other thrombus is visualized.     This report was flagged in Epic as abnormal.     Electronically signed by resident: Erika Baxter   Date: 09/01/2020   Time: 11:49     Electronically signed by: Reji Ray MD   Date: 09/01/2020   Time: 12:02   X-Ray Chest  1 View [[id number]] Resulted: 09/01/20 0959   Order Status: Completed Updated: 09/01/20 1002   Narrative:     EXAMINATION:   XR CHEST 1 VIEW     CLINICAL HISTORY:   eval pneumonia;     TECHNIQUE:   Single frontal view of the chest was performed.     COMPARISON:   Chest radiographs: 08/30/2020.  08/28/2020.  08/26/2020.     Chest CTA: 08/05/2020.     FINDINGS:   Mediastinal structures are midline.  Mediastinal contours are stable.  Cardiac silhouette and pulmonary vascular distribution are normal.     Lung volumes are normal and symmetric. I detect no pulmonary disease, pleural fluid, lymph node enlargement, cardiac decompensation, pneumothorax, pneumomediastinum, pneumoperitoneum or significant osseous abnormality.    Impression:       No pneumonia or other acute disease identified.       Electronically signed by: Janett Rosales MD   Date: 09/01/2020   Time: 09:59   X-Ray Chest 1 View [[id number]] Resulted: 08/30/20 1143   Order Status: Completed Updated: 08/30/20 1146   Narrative:     EXAMINATION:   XR CHEST 1 VIEW     CLINICAL HISTORY:   Pneumonia.     TECHNIQUE:   Single frontal view of the chest was performed.     COMPARISON:   Multiple prior radiographs of the chest, most recent from 08/28/2020.     FINDINGS:   The lungs are well expanded and clear. No focal opacities are seen. The pleural spaces are clear.  The cardiac silhouette is unremarkable.  The visualized osseous structures are unremarkable.    Impression:       No acute cardiopulmonary abnormality.       Electronically signed by: Sanford James   Date: 08/30/2020   Time: 11:43   X-Ray Chest AP Portable [[id number]] Resulted: 08/28/20 1353   Order Status: Completed Updated: 08/28/20 1356   Narrative:     EXAMINATION:   XR CHEST AP PORTABLE     CLINICAL HISTORY:   cough;     TECHNIQUE:   Single frontal view of the chest was performed.     COMPARISON:   Chest radiograph: 08/27/2020.     Chest CT: 08/05/2020.     FINDINGS:   Interval extubation and removal of  NG tube.     Mediastinal structures are midline. Cardiac silhouette and pulmonary vascular distribution are normal.     Lung volumes are normal and symmetric. I detect no pulmonary disease, pleural fluid, lymph node enlargement, cardiac decompensation, pneumothorax, pneumomediastinum, pneumoperitoneum or significant osseous abnormality.    Impression:       No convincing evidence of disease.  No source for cough identified.       Electronically signed by: Janett Rosales MD   Date: 08/28/2020   Time: 13:53

## 2020-09-03 NOTE — HPI
Glen Moscoso is a 24 year old male with history of cerebral palsy, seizures, and recent admission for hyponatremia who presents with encephalopathy, respiratory distress and possible seizure. He presented to OSH after being found unresponsive and in resp distress. He was found down by his mother upon EMS arrival he had agonal resp 3-4 per min, he was intubated by EMS and take to OSH ED. He was hypotensive on arrival started on Levo gtt, ABG showed metabolic acidosis. WBC 22K. CXR clear. He was admitted to [facility] on 8/26 for a higher level of care, EGG and close monitoring. Started on empiric Vanc and Zosyn and IVF.     On 8/27 he was extubated. Tube feeds, chest physiotherapy were started. Blood cx and urine cx returned negative. Resp cx grew Serratia and Pseudomonas. C diff testing negative.  EEG did not show any epileptiform activity or discharge. He improved with fluids and abx and returned back to baseline per his mother. Suspect AMS due to dehydration and infection. Leukocytosis resolved. ABx deescalated to Cipro on 8/29. Since then he has been having fevers. He was found to have Left basilic vein thrombosis around midline and it was removed. He completed Cipro on 9/1. Stepped down to the floor yesterday. Given ongoing fevers blood cx repeated and are negative. Sputum cx 8/31 is showing Serratia again. Zosyn started. ID consulted for abx recs. He is on RA. O2 sats in the low 90s. He has multiple necrotic decubitus ulcerations. General surgery consulted. Wound care following.    Of note, he was recently admitted 8/4 - 8/18 for fever, diarrhea and cough. Per brief chart review,  No clear cause of fevers but in the prior two months he has been innudated with antibiotics. Cultures returned negative. Tx with Vanc and Levofloxacin for PNA. Also treated empirically for C diff with oral flagyl and vanc. Symptoms improved prior to discharge. Fever and leukocytosis resolved.

## 2020-09-03 NOTE — SUBJECTIVE & OBJECTIVE
Past Medical History:   Diagnosis Date    Acid reflux     Cerebral palsy     History of hip surgery     S/P percutaneous endoscopic gastrostomy (PEG) tube placement     Seizures        History reviewed. No pertinent surgical history.    Review of patient's allergies indicates:  No Known Allergies    Medications:  Medications Prior to Admission   Medication Sig    baclofen (LIORESAL) 10 MG tablet Take 10 mg by mouth 3 (three) times daily.    cholestyramine (QUESTRAN) 4 gram packet Take 1 packet (4 g total) by mouth 2 (two) times daily.    diphenhydrAMINE (BENADRYL) 12.5 mg/5 mL elixir Take by mouth 4 (four) times daily as needed for Allergies.    gabapentin (NEURONTIN) 250 mg/5 mL solution Take by mouth nightly. At bedtime    ibuprofen (ADVIL,MOTRIN) 100 mg/5 mL suspension Take by mouth every 6 (six) hours as needed for Temperature greater than.    Lactobacillus rhamnosus GG (CULTURELLE) 10 billion cell capsule 1 capsule by Per G Tube route once daily.    nystatin (MYCOSTATIN) cream Apply topically 2 (two) times daily.    PHENobarbitaL 20 mg/5 mL (4 mg/mL) Elix elixir Take 25 mLs (100 mg total) by mouth every evening.    propranoloL (INDERAL) 20 MG tablet Take 1 tablet (20 mg total) by mouth 3 (three) times daily.     Antibiotics (From admission, onward)    Start     Stop Route Frequency Ordered    09/03/20 0845  piperacillin-tazobactam 4.5 g in sodium chloride 0.9% 100 mL IVPB (ready to mix system)      -- IV Every 8 hours (non-standard times) 09/03/20 0731        Antifungals (From admission, onward)    None        Antivirals (From admission, onward)    None             There is no immunization history on file for this patient.    Family History     None        Social History     Socioeconomic History    Marital status: Single     Spouse name: Not on file    Number of children: Not on file    Years of education: Not on file    Highest education level: Not on file   Occupational History    Not on  file   Social Needs    Financial resource strain: Not on file    Food insecurity     Worry: Not on file     Inability: Not on file    Transportation needs     Medical: Not on file     Non-medical: Not on file   Tobacco Use    Smoking status: Never Smoker    Smokeless tobacco: Never Used   Substance and Sexual Activity    Alcohol use: Never     Frequency: Never    Drug use: Not Currently    Sexual activity: Not on file   Lifestyle    Physical activity     Days per week: Not on file     Minutes per session: Not on file    Stress: Not on file   Relationships    Social connections     Talks on phone: Not on file     Gets together: Not on file     Attends L[employer]-day service: Not on file     Active member of club or organization: Not on file     Attends meetings of clubs or organizations: Not on file     Relationship status: Not on file   Other Topics Concern    Not on file   Social History Narrative    Not on file     Review of Systems   Unable to perform ROS: Patient nonverbal     Objective:     Vital Signs (Most Recent):  Temp: 99.8 °F (37.7 °C) (09/03/20 0730)  Pulse: (!) 113 (09/03/20 1100)  Resp: 18 (09/03/20 0730)  BP: 114/62 (09/03/20 0730)  SpO2: 95 % (09/03/20 0730) Vital Signs (24h Range):  Temp:  [99.5 °F (37.5 °C)-101.2 °F (38.4 °C)] 99.8 °F (37.7 °C)  Pulse:  [100-127] 113  Resp:  [18-33] 18  SpO2:  [91 %-98 %] 95 %  BP: (107-130)/(58-84) 114/62     Weight: 33.1 kg (73 lb)  Body mass index is 15.8 kg/m².    Estimated Creatinine Clearance: 108.5 mL/min (based on SCr of 0.5 mg/dL).    Physical Exam  Vitals signs and nursing note reviewed.   Constitutional:       General: He is not in acute distress.     Appearance: He is not diaphoretic.   HENT:      Head: Atraumatic.      Nose: Nose normal.      Mouth/Throat:      Mouth: Mucous membranes are dry.      Comments: Dry cracked lips  Eyes:      General: No scleral icterus.     Conjunctiva/sclera: Conjunctivae normal.   Cardiovascular:      Rate and  Rhythm: Regular rhythm. Tachycardia present.      Heart sounds: Normal heart sounds.   Pulmonary:      Effort: Pulmonary effort is normal. No respiratory distress.      Comments: Course breath sounds bilaterally  RA  Abdominal:      General: There is no distension.      Palpations: Abdomen is soft.   Musculoskeletal:      Right lower leg: No edema.      Left lower leg: No edema.   Skin:     Findings: Lesion present. No rash.      Comments: Necrotic ischial decubitus ulcerations   Neurological:      Mental Status: He is unresponsive.                 Significant Labs:   Bilirubin:   Recent Labs   Lab 08/30/20  0638 08/31/20  0213 09/01/20  0045 09/02/20  0252 09/03/20  0809   BILITOT 0.1 0.2 0.1 0.2 0.1     BMP:   Recent Labs   Lab 09/03/20  0809   GLU 99      K 3.8      CO2 28   BUN 6   CREATININE 0.5   CALCIUM 7.8*   MG 1.7     CBC:   Recent Labs   Lab 09/02/20  0252 09/03/20  0809   WBC 9.08 9.85   HGB 7.8* 7.8*   HCT 25.4* 25.7*   * 408*     CMP:   Recent Labs   Lab 09/02/20  0252 09/03/20  0809    138   K 4.3 3.8    104   CO2 26 28   GLU 90 99   BUN 5* 6   CREATININE 0.5 0.5   CALCIUM 7.5* 7.8*   PROT 5.5* 5.6*   ALBUMIN 1.6* 1.5*   BILITOT 0.2 0.1   ALKPHOS 102 110   AST 29 24   ALT 15 17   ANIONGAP 6* 6*   EGFRNONAA >60.0 >60.0     Fungus Culture (Blood or Bone Marrow): No results for input(s): FUNGUSCULTUR in the last 4320 hours.  Lactic Acid: No results for input(s): LACTATE in the last 48 hours.  Procalcitonin: No results for input(s): PROCAL in the last 48 hours.  Quantiferon: No results for input(s): NIL, TBAG, TBAGNIL, MITOGENNIL, TBGOLD in the last 48 hours.  Respiratory Culture:   Recent Labs   Lab 08/26/20  0505 08/31/20  2157   GSRESP <10 epithelial cells per low power field.  Moderate WBC's  Many Gram negative rods  Many Gram negative diplococci <10 epithelial cells per low power field.  Rare WBC's  Rare Gram negative rods   RESPIRATORYC No S aureus isolated.   SERRATIA MARCESCENS  Moderate  Normal respiratory jose also present  *  PSEUDOMONAS AERUGINOSA  Moderate  * No S aureus or Pseudomonas isolated.  SERRATIA MARCESCENS  Moderate  *     Urine Culture:   Recent Labs   Lab 08/26/20  0915   LABURIN No significant growth     Urine Studies:   Recent Labs   Lab 08/09/20  0555 08/26/20  0915   COLORU Yellow Vane   APPEARANCEUA Hazy* Cloudy*   PHUR 6.0 5.0   SPECGRAV >=1.030* 1.025   PROTEINUA 1+* 1+*   GLUCUA Negative Negative   KETONESU Negative Negative   BILIRUBINUA Negative Negative   OCCULTUA 3+* 2+*   NITRITE Negative Negative   UROBILINOGEN Negative  --    LEUKOCYTESUR Negative Trace*   RBCUA 40* 13*   WBCUA 8* 18*   BACTERIA Occasional Occasional   SQUAMEPITHEL  --  1   HYALINECASTS 2* 7*     Wound Culture: No results for input(s): LABAERO in the last 4320 hours.  All pertinent labs within the past 24 hours have been reviewed.    Significant Imaging: I have reviewed all pertinent imaging results/findings within the past 24 hours.   US Lower Extremity Veins Bilateral [[id number]] Resulted: 09/01/20 1204   Order Status: Completed Updated: 09/01/20 1206   Narrative:     EXAMINATION:   US LOWER EXTREMITY VEINS BILATERAL     CLINICAL HISTORY:   r/o DVTs;     TECHNIQUE:   Duplex and color flow Doppler and dynamic compression was performed of the bilateral lower extremity veins was performed.     COMPARISON:   None     FINDINGS:   Right thigh veins: The common femoral, femoral, popliteal, upper greater saphenous, and deep femoral veins are patent and free of thrombus. The veins are normally compressible and have normal phasic flow and augmentation response.     Right calf veins: The visualized calf veins are patent.     Left thigh veins: The common femoral, femoral, popliteal, upper greater saphenous, and deep femoral veins are patent and free of thrombus. The veins are normally compressible and have normal phasic flow and augmentation response.     Left calf veins: The  visualized calf veins are patent.     There is a heterogeneous collection within the right lateral leg measuring 3.8 x 1.7 x 3.0 cm.  No drainable collection.    Impression:       No evidence of deep venous thrombosis in either lower extremity.     Heterogeneous collection within the lateral aspect of the right lower extremity at the level of the hip, which may represent a small hematoma.  No focal drainable fluid collection.     Electronically signed by resident: Erika Baxter   Date: 09/01/2020   Time: 11:57     Electronically signed by: Reji Ray MD   Date: 09/01/2020   Time: 12:04   US Upper Extremity Veins Right [[id number]] (Abnormal) Resulted: 09/01/20 1202   Order Status: Completed Updated: 09/01/20 1205   Narrative:     EXAMINATION:   US UPPER EXTREMITY VEINS LEFT; US UPPER EXTREMITY VEINS RIGHT     CLINICAL HISTORY:   r/o dvts;; ? dvt;     TECHNIQUE:   Duplex and color flow Doppler evaluation and dynamic compression was performed of the right and left upper extremity veins.     COMPARISON:   None     FINDINGS:   Right central veins: The internal jugular, subclavian, and axillary veins are patent and free of thrombus.     Right arm veins: The brachial, and basilic veins are patent and compressible.  The cephalic vein is not visualized and occlusion is not entirely excluded.     Left central veins: The internal jugular, subclavian, and axillary veins are patent and free of thrombus.     Left arm veins: The brachial and cephalic veins are patent and compressible, noting that there is partial obscuration by overlying bandage.     There is a peripheral venous catheter within the left basilic vein.  There is surrounding occlusive thrombus and expansion of the vein.     Miscellaneous: N/A    Impression:       Thrombosis of the left basilic vein surrounding a peripheral venous catheter with expansion of the vein.     The right cephalic vein is not visualized and occlusion is not entirely excluded.     No  other thrombus is visualized.     This report was flagged in Epic as abnormal.     Electronically signed by resident: Erika Baxter   Date: 09/01/2020   Time: 11:49     Electronically signed by: Reji Ray MD   Date: 09/01/2020   Time: 12:02   US Upper Extremity Veins Left [[id number]] (Abnormal) Resulted: 09/01/20 1202   Order Status: Completed Updated: 09/01/20 1205   Narrative:     EXAMINATION:   US UPPER EXTREMITY VEINS LEFT; US UPPER EXTREMITY VEINS RIGHT     CLINICAL HISTORY:   r/o dvts;; ? dvt;     TECHNIQUE:   Duplex and color flow Doppler evaluation and dynamic compression was performed of the right and left upper extremity veins.     COMPARISON:   None     FINDINGS:   Right central veins: The internal jugular, subclavian, and axillary veins are patent and free of thrombus.     Right arm veins: The brachial, and basilic veins are patent and compressible.  The cephalic vein is not visualized and occlusion is not entirely excluded.     Left central veins: The internal jugular, subclavian, and axillary veins are patent and free of thrombus.     Left arm veins: The brachial and cephalic veins are patent and compressible, noting that there is partial obscuration by overlying bandage.     There is a peripheral venous catheter within the left basilic vein.  There is surrounding occlusive thrombus and expansion of the vein.     Miscellaneous: N/A    Impression:       Thrombosis of the left basilic vein surrounding a peripheral venous catheter with expansion of the vein.     The right cephalic vein is not visualized and occlusion is not entirely excluded.     No other thrombus is visualized.     This report was flagged in Epic as abnormal.     Electronically signed by resident: Erika Baxter   Date: 09/01/2020   Time: 11:49     Electronically signed by: Reji Ray MD   Date: 09/01/2020   Time: 12:02   X-Ray Chest 1 View [[id number]] Resulted: 09/01/20 0959   Order Status: Completed Updated: 09/01/20 1002    Narrative:     EXAMINATION:   XR CHEST 1 VIEW     CLINICAL HISTORY:   eval pneumonia;     TECHNIQUE:   Single frontal view of the chest was performed.     COMPARISON:   Chest radiographs: 08/30/2020.  08/28/2020.  08/26/2020.     Chest CTA: 08/05/2020.     FINDINGS:   Mediastinal structures are midline.  Mediastinal contours are stable.  Cardiac silhouette and pulmonary vascular distribution are normal.     Lung volumes are normal and symmetric. I detect no pulmonary disease, pleural fluid, lymph node enlargement, cardiac decompensation, pneumothorax, pneumomediastinum, pneumoperitoneum or significant osseous abnormality.    Impression:       No pneumonia or other acute disease identified.       Electronically signed by: Janett Rosales MD   Date: 09/01/2020   Time: 09:59   X-Ray Chest 1 View [[id number]] Resulted: 08/30/20 1143   Order Status: Completed Updated: 08/30/20 1146   Narrative:     EXAMINATION:   XR CHEST 1 VIEW     CLINICAL HISTORY:   Pneumonia.     TECHNIQUE:   Single frontal view of the chest was performed.     COMPARISON:   Multiple prior radiographs of the chest, most recent from 08/28/2020.     FINDINGS:   The lungs are well expanded and clear. No focal opacities are seen. The pleural spaces are clear.  The cardiac silhouette is unremarkable.  The visualized osseous structures are unremarkable.    Impression:       No acute cardiopulmonary abnormality.       Electronically signed by: Sanford James   Date: 08/30/2020   Time: 11:43   X-Ray Chest AP Portable [[id number]] Resulted: 08/28/20 1353   Order Status: Completed Updated: 08/28/20 1356   Narrative:     EXAMINATION:   XR CHEST AP PORTABLE     CLINICAL HISTORY:   cough;     TECHNIQUE:   Single frontal view of the chest was performed.     COMPARISON:   Chest radiograph: 08/27/2020.     Chest CT: 08/05/2020.     FINDINGS:   Interval extubation and removal of NG tube.     Mediastinal structures are midline. Cardiac silhouette and pulmonary vascular  distribution are normal.     Lung volumes are normal and symmetric. I detect no pulmonary disease, pleural fluid, lymph node enlargement, cardiac decompensation, pneumothorax, pneumomediastinum, pneumoperitoneum or significant osseous abnormality.    Impression:       No convincing evidence of disease.  No source for cough identified.       Electronically signed by: Janett Rosales MD   Date: 08/28/2020   Time: 13:53

## 2020-09-03 NOTE — PROGRESS NOTES
Progress Note   Hospital Medicine         Patient Name: Glen Moscoso  MRN:  [id number]  Hospital Medicine Team: [facility] [place] Quiana Armenta MD  Date of Admission:  8/26/2020     Length of Stay:  LOS: 8 days   Expected Discharge Date: 9/9/2020  Principal Problem:  Acute respiratory failure with hypoxia       Subjective:     Interval History/Overnight Events:  Stepped down overnight, mom at bedside, reports she feels like his breathing has been improved since the last step down in which he went back to ICU in a day. She reports no further upper airway noise as much as previous. He is still having feveres multiple times to 101 now, completed antibiotic course previously before step down for serratia, pseudomonas in sputum, on step back up to icu on 8/31 he had repeat resp Cx (while on antibiotics for previous pna with those) and they are growing serratia again sensitivity pending. hector restart zosyn to cover this as question of possible inadequate coverage vs resistance vs length of treamtnet vs colniziatoin unsure in setting of reoccurent fevers however improved CXR and stable resp exam. His suctioning freuqeqncy and sputum isnt completely at baseline however per mom (stil worse than normal from home with known aspiration issues at baseline). He has nonviable sacral tissue per wound care (See pictures0. He didn't have issues with sacral injurie until about a month ago when he had severe diarrhea prior to [facility] admit and they tried to keep the bMs out of his wounds but could not an they worsned quickly per his mom. She has never talked about diverting ostomy, discussed briefly that this is a last resort type option if wounds fail to heal or have reoccurent infections. Discussed with gen surg need for bedside vs OR deridment per wound care resc now, could be source also of fevers, he had UE clots but lines exchanged yesterday priro to step down. No LE clots.  ID consultd to assist regarding source, as unsure if resp  CX is colonizer or true infection. Repeat Resp Cx ordered now.  Has some mild low oxygen sast on RA, that coincide with HR increases to up to 120. 1 BM overnight.    Will f/u recs.      Review of Systems   Unable to assess secondary to baseline cognitive status.  All other systems reviewed and are negative.    Objective:     Temp:  [99.5 °F (37.5 °C)-101.2 °F (38.4 °C)]   Pulse:  [100-127]   Resp:  [18-33]   BP: (107-130)/(58-84)   SpO2:  [91 %-98 %]       Physical Exam:  Constitutional: Appears underweight, chronically ill baseline. Nonverbal. Contractures.  Head: Normocephalic and atraumatic.   Mouth/Throat: Oropharynx is clear and moist.   Eyes: EOM are normal. Pupils are equal, round, and reactive to light. No scleral icterus.   Neck: Normal range of motion. Neck supple. some contracture in neck.  Cardiovascular: Normal rate and regular rhythm.  No murmur heard.  Pulmonary/Chest: Effort normal and breath sounds normal. Coarse breath sounds. On room air.   Abdominal: Soft. Bowel sounds are normal.  No distension or tenderness  Musculoskeletal: muscle wasting. Contractures.  Neurological: not alert to place or person, baseline status. No gross abnormalities on minimal neuro exam- moving all 4 extremities. Cannot assess cranial nerves or sensation. bedbound baseline.  Skin: Skin is warm and dry. sacral ulcerations with full thickness breakdown seen with black nonviable tissue (See wound care pictures). Keloid type changes on bilatearl hips. Peripheral IV in left arm.  Psychiatric: Normal mood and affect. Behavior is normal.     Recent Labs   Lab 08/29/20  0113 08/30/20  0638 08/31/20  0213 09/01/20  0045 09/02/20  0252 09/03/20  0809   WBC 14.02* 9.51 8.00 9.52 9.08 9.85   HGB 9.9* 8.3* 8.5* 8.9* 7.8* 7.8*   HCT 32.7* 27.6* 28.8* 29.5* 25.4* 25.7*    394* 367* 388* 396* 408*     Recent Labs   Lab 09/01/20  0045 09/01/20  1223 09/02/20  0252 09/03/20  0809     --  139 138   K 3.5 4.5 4.3 3.8      --  107 104   CO2 25  --  26 28   BUN 5*  --  5* 6   CREATININE 0.6  --  0.5 0.5   *  --  90 99   CALCIUM 7.5*  --  7.5* 7.8*   MG 1.8  --  1.5* 1.7   PHOS 1.2* 3.0 2.2* 2.2*     Recent Labs   Lab 09/01/20  0045 09/02/20  0252 09/03/20  0809   ALKPHOS 119 102 110   ALT 18 15 17   AST 21 29 24   ALBUMIN 1.8* 1.6* 1.5*   PROT 6.1 5.5* 5.6*   BILITOT 0.1 0.2 0.1   INR 1.1  --   --      Recent Labs   Lab 08/30/20  1728 08/30/20  2159 08/31/20  0803 08/31/20  1233 08/31/20  1829 08/31/20  2049   POCTGLUCOSE 83 88 81 127* 107 181*        baclofen  10 mg Per G Tube TID    heparin (porcine)  5,000 Units Subcutaneous Q12H    Lactobacillus rhamnosus GG  1 capsule Per G Tube Daily    PHENobarbitaL  100 mg Per G Tube QHS    piperacillin-tazobactam (ZOSYN) IVPB  4.5 g Intravenous Q8H    potassium, sodium phosphates  1 packet Per G Tube QID (AC & HS)    propranoloL  20 mg Per G Tube TID       Assessment and Plan     Mr. Glen Moscoso is a 24 y.o. male who presented to [facility] on 8/26/2020 with     Acute respiratory failure with hypoxia  Serratia/Pseudomonas Pneumonia     · Patient intubated upon transfer from outside facility and extubated 8/27  ·  Patient found to have pneumonia and felt cause of respiratory failure, treated with course of cipro, stepped back up to ICU 8/31 for worse resp fx and stridor, improved with chin thrust manuevers but still required extra stay, requires frequent suctioning for this,   · Sputum Cx growing serratia again on 8/31 while on treatment with cipro, still febrile every day to 101 multiple times, sputum less per mom but still has episodes likely aspiration with increased HR, and fever, on RA with sats 92-98% now  · CXR has improved  · -unsure if colonizer or source of fever is recurrent serratia, ID consulted to assist if further course recommended or change in course/repeat given still febrile. Started zosyn on step down 9/3           Biphasic stridor  · Patient with acute  development of stridor after extubation of 8/27 but acutely worsened on 8/30.   · Likely mechanical due to neck muscle weakness related to his advance cerebral palsy as improved when patient was sat up and chin was pulled up) and recent acute illnesses but also likely a degree of airway inflammation as contributing to upper air obstruction.   · Patient should be at 90 degree angle at all times to help with breathing mechanics and will monitor.  · Stepped back to ICU 8/31 for this, requires frequent suctioning, jaw thrust improves this, mom reports improving now on step down 9/3, continue nebs and supportive care  ·      Pneumonia due to Pseudomonas aerginuosa and Serratia marcesens  · Patient admitted with acute hypoxic respiratory failure and sepsis. Blood and urine cultures negative and C. Diff negative but respiratory culture grew Serratia and Pseudomonas. Procal improved since initiation of antibiotics and WBC normalized and sepsis resolved (procal 4--. 3)  -see above for acute resp failure, ID consulted 9/3 for 8/31 serratia + again to reassess, zosyn started today given recurrent fevers         Encephalopathy acute  · Improved, at baseline on step down 9/3 per mom, non verbal at baseline but interacts with his mom somewhat  · Patient with decreased mentation from baseline on admit. Patient transferred to [facility] ICU for further evaluation. EEG done and showed no status epilepticus and encephalopathy felt related to sepsis/infection.     Decubitus ulcer of left buttock, stage 2  Deep tissue injury  Wounds present on admit an wound care- mom reports had very bad stool issues a month ago causing these worsening wounds prior to admit  -wound care recs triad cream to bilatearl ischium, scrotum, r wrist pain daily with betadine  -nonviable tissue to sacrum (See pictures)  -gen surg consultd 9/3 for debridmenet, spoke with resident on phone. May be able to perform bedside  -unsure if source of fevers potentially  also, will f/u recs.  Continue to Turn every 2 hrs. Pad bony prominences with pillows or foam dressings.       Seizure disorder  Chronic and controlled. EEG done on this admit showed no status epilepticus.   -Continue Phenobarbital 100 mg nightly via G tube to treat as patient takes at home         Anemia of chronic disease  Chronic and controlled. Monitor with daily CBC and transfuse if Hgb < 7.   -Hg between 7-8 now        Hypokalemia  Hypophosphatemia  -replace PRN          Hypernatremia  · Patient developed hypernatremia in ICU related to normal saline infusion and free water depletion. Normal saline infusion stopped on am of 8/29 when sodium got up to 150.  · Had issuse with lower Na with free water flushes so stopped in ICU on step up 8/31, currently normal Na on TF without extra free water              Severe protein-calorie malnutrition  · Patient with obvious muscle wasting and BMI of 15.8. Nutrition consulted on admit.   · Patient started on TF with Peptamen 1.5 with prebio at 40 cc/hr.         Cerebral palsy  Patient with poor functional baseline and bed bound with extremity contractures and total care at home.      Diet:  Tube feeds- peptamen   DVT PPx:  TEDS/SCDS             Disposition:  Pending fever work up, ID recs, gen surg debridment likely, suspect stay through weekend    Discharge Planning   ROCÍO: 9/9/2020     Code Status: Full Code   Is the patient medically ready for discharge?: No    Reason for patient still in hospital (select all that apply): Patient unstable  Discharge Plan A: Home with family, Home Health

## 2020-09-03 NOTE — PLAN OF CARE
Per wound care, [facility] has speciality beds for home that may be comparable to bed pt is on at hospital. LORRI contacted Kar Rubio with [employer] ([phone])-he referred lorri to Mel Yip, the home team rep, [phone].    Per Mel with [facility], Medicaid will not cover a spceiality bed that is comparable to what pt is currently on in the hospital and [facility] is not in network with [place] Medicaid.      Salud Rizzo, [facility] [phone]

## 2020-09-04 LAB
ALBUMIN SERPL BCP-MCNC: 1.5 G/DL (ref 3.5–5.2)
ALP SERPL-CCNC: 126 U/L (ref 55–135)
ALT SERPL W/O P-5'-P-CCNC: 20 U/L (ref 10–44)
ANION GAP SERPL CALC-SCNC: 9 MMOL/L (ref 8–16)
AST SERPL-CCNC: 25 U/L (ref 10–40)
BASOPHILS # BLD AUTO: 0.02 K/UL (ref 0–0.2)
BASOPHILS NFR BLD: 0.2 % (ref 0–1.9)
BILIRUB SERPL-MCNC: <0.1 MG/DL (ref 0.1–1)
BUN SERPL-MCNC: 7 MG/DL (ref 6–20)
CALCIUM SERPL-MCNC: 8 MG/DL (ref 8.7–10.5)
CHLORIDE SERPL-SCNC: 103 MMOL/L (ref 95–110)
CO2 SERPL-SCNC: 24 MMOL/L (ref 23–29)
CREAT SERPL-MCNC: 0.5 MG/DL (ref 0.5–1.4)
DIFFERENTIAL METHOD: ABNORMAL
EOSINOPHIL # BLD AUTO: 0 K/UL (ref 0–0.5)
EOSINOPHIL NFR BLD: 0 % (ref 0–8)
ERYTHROCYTE [DISTWIDTH] IN BLOOD BY AUTOMATED COUNT: 17.2 % (ref 11.5–14.5)
EST. GFR  (AFRICAN AMERICAN): >60 ML/MIN/1.73 M^2
EST. GFR  (NON AFRICAN AMERICAN): >60 ML/MIN/1.73 M^2
GLUCOSE SERPL-MCNC: 103 MG/DL (ref 70–110)
HCT VFR BLD AUTO: 22.7 % (ref 40–54)
HGB BLD-MCNC: 7.2 G/DL (ref 14–18)
IMM GRANULOCYTES # BLD AUTO: 0.04 K/UL (ref 0–0.04)
IMM GRANULOCYTES NFR BLD AUTO: 0.4 % (ref 0–0.5)
LYMPHOCYTES # BLD AUTO: 1.5 K/UL (ref 1–4.8)
LYMPHOCYTES NFR BLD: 15.9 % (ref 18–48)
MAGNESIUM SERPL-MCNC: 1.6 MG/DL (ref 1.6–2.6)
MCH RBC QN AUTO: 29.5 PG (ref 27–31)
MCHC RBC AUTO-ENTMCNC: 31.7 G/DL (ref 32–36)
MCV RBC AUTO: 93 FL (ref 82–98)
MONOCYTES # BLD AUTO: 0.8 K/UL (ref 0.3–1)
MONOCYTES NFR BLD: 8.8 % (ref 4–15)
NEUTROPHILS # BLD AUTO: 7.1 K/UL (ref 1.8–7.7)
NEUTROPHILS NFR BLD: 74.7 % (ref 38–73)
NRBC BLD-RTO: 0 /100 WBC
PHOSPHATE SERPL-MCNC: 2.8 MG/DL (ref 2.7–4.5)
PLATELET # BLD AUTO: 467 K/UL (ref 150–350)
PMV BLD AUTO: 9.9 FL (ref 9.2–12.9)
POCT GLUCOSE: 81 MG/DL (ref 70–110)
POTASSIUM SERPL-SCNC: 3.9 MMOL/L (ref 3.5–5.1)
PROT SERPL-MCNC: 5.8 G/DL (ref 6–8.4)
RBC # BLD AUTO: 2.44 M/UL (ref 4.6–6.2)
SODIUM SERPL-SCNC: 136 MMOL/L (ref 136–145)
WBC # BLD AUTO: 9.51 K/UL (ref 3.9–12.7)

## 2020-09-04 PROCEDURE — 36415 COLL VENOUS BLD VENIPUNCTURE: CPT

## 2020-09-04 PROCEDURE — 99233 PR SUBSEQUENT HOSPITAL CARE,LEVL III: ICD-10-PCS | Mod: ,,, | Performed by: HOSPITALIST

## 2020-09-04 PROCEDURE — 94761 N-INVAS EAR/PLS OXIMETRY MLT: CPT

## 2020-09-04 PROCEDURE — 25000003 PHARM REV CODE 250: Performed by: STUDENT IN AN ORGANIZED HEALTH CARE EDUCATION/TRAINING PROGRAM

## 2020-09-04 PROCEDURE — 11000001 HC ACUTE MED/SURG PRIVATE ROOM

## 2020-09-04 PROCEDURE — 99233 SBSQ HOSP IP/OBS HIGH 50: CPT | Mod: ,,, | Performed by: PHYSICIAN ASSISTANT

## 2020-09-04 PROCEDURE — 80053 COMPREHEN METABOLIC PANEL: CPT

## 2020-09-04 PROCEDURE — 25000003 PHARM REV CODE 250: Performed by: HOSPITALIST

## 2020-09-04 PROCEDURE — 84100 ASSAY OF PHOSPHORUS: CPT

## 2020-09-04 PROCEDURE — 99900035 HC TECH TIME PER 15 MIN (STAT)

## 2020-09-04 PROCEDURE — 94668 MNPJ CHEST WALL SBSQ: CPT

## 2020-09-04 PROCEDURE — 83735 ASSAY OF MAGNESIUM: CPT

## 2020-09-04 PROCEDURE — 63600175 PHARM REV CODE 636 W HCPCS: Performed by: STUDENT IN AN ORGANIZED HEALTH CARE EDUCATION/TRAINING PROGRAM

## 2020-09-04 PROCEDURE — 25500020 PHARM REV CODE 255: Performed by: HOSPITALIST

## 2020-09-04 PROCEDURE — 25000003 PHARM REV CODE 250: Performed by: PHYSICIAN ASSISTANT

## 2020-09-04 PROCEDURE — 63600175 PHARM REV CODE 636 W HCPCS: Performed by: HOSPITALIST

## 2020-09-04 PROCEDURE — 99233 PR SUBSEQUENT HOSPITAL CARE,LEVL III: ICD-10-PCS | Mod: ,,, | Performed by: PHYSICIAN ASSISTANT

## 2020-09-04 PROCEDURE — 85025 COMPLETE CBC W/AUTO DIFF WBC: CPT

## 2020-09-04 PROCEDURE — 99233 SBSQ HOSP IP/OBS HIGH 50: CPT | Mod: ,,, | Performed by: HOSPITALIST

## 2020-09-04 PROCEDURE — 99900026 HC AIRWAY MAINTENANCE (STAT)

## 2020-09-04 RX ORDER — GABAPENTIN 250 MG/5ML
250 SOLUTION ORAL NIGHTLY
Status: DISCONTINUED | OUTPATIENT
Start: 2020-09-04 | End: 2020-09-04

## 2020-09-04 RX ORDER — DIPHENHYDRAMINE HCL 12.5MG/5ML
12.5 ELIXIR ORAL NIGHTLY
Status: DISCONTINUED | OUTPATIENT
Start: 2020-09-04 | End: 2020-09-18 | Stop reason: HOSPADM

## 2020-09-04 RX ORDER — LOPERAMIDE HYDROCHLORIDE 2 MG/1
2 CAPSULE ORAL 2 TIMES DAILY
Status: DISCONTINUED | OUTPATIENT
Start: 2020-09-04 | End: 2020-09-06

## 2020-09-04 RX ORDER — GABAPENTIN 250 MG/5ML
250 SOLUTION ORAL NIGHTLY
Status: DISCONTINUED | OUTPATIENT
Start: 2020-09-04 | End: 2020-09-18 | Stop reason: HOSPADM

## 2020-09-04 RX ADMIN — GABAPENTIN 250 MG: 250 SOLUTION ORAL at 08:09

## 2020-09-04 RX ADMIN — BACLOFEN 10 MG: 10 TABLET ORAL at 07:09

## 2020-09-04 RX ADMIN — PIPERACILLIN SODIUM AND TAZOBACTAM SODIUM 4.5 G: 4; .5 INJECTION, POWDER, LYOPHILIZED, FOR SOLUTION INTRAVENOUS at 04:09

## 2020-09-04 RX ADMIN — POTASSIUM & SODIUM PHOSPHATES POWDER PACK 280-160-250 MG 1 PACKET: 280-160-250 PACK at 03:09

## 2020-09-04 RX ADMIN — HEPARIN SODIUM 5000 UNITS: 5000 INJECTION INTRAVENOUS; SUBCUTANEOUS at 08:09

## 2020-09-04 RX ADMIN — LOPERAMIDE HYDROCHLORIDE 2 MG: 2 CAPSULE ORAL at 08:09

## 2020-09-04 RX ADMIN — DIPHENHYDRAMINE HYDROCHLORIDE 12.5 MG: 25 SOLUTION ORAL at 08:09

## 2020-09-04 RX ADMIN — PROPRANOLOL HYDROCHLORIDE 20 MG: 20 TABLET ORAL at 08:09

## 2020-09-04 RX ADMIN — PIPERACILLIN SODIUM AND TAZOBACTAM SODIUM 4.5 G: 4; .5 INJECTION, POWDER, LYOPHILIZED, FOR SOLUTION INTRAVENOUS at 07:09

## 2020-09-04 RX ADMIN — PROPRANOLOL HYDROCHLORIDE 20 MG: 20 TABLET ORAL at 03:09

## 2020-09-04 RX ADMIN — PIPERACILLIN SODIUM AND TAZOBACTAM SODIUM 4.5 G: 4; .5 INJECTION, POWDER, LYOPHILIZED, FOR SOLUTION INTRAVENOUS at 05:09

## 2020-09-04 RX ADMIN — POTASSIUM & SODIUM PHOSPHATES POWDER PACK 280-160-250 MG 1 PACKET: 280-160-250 PACK at 09:09

## 2020-09-04 RX ADMIN — BACLOFEN 10 MG: 10 TABLET ORAL at 03:09

## 2020-09-04 RX ADMIN — HEPARIN SODIUM 5000 UNITS: 5000 INJECTION INTRAVENOUS; SUBCUTANEOUS at 07:09

## 2020-09-04 RX ADMIN — Medication 1 CAPSULE: at 09:09

## 2020-09-04 RX ADMIN — PROPRANOLOL HYDROCHLORIDE 20 MG: 20 TABLET ORAL at 07:09

## 2020-09-04 RX ADMIN — IOHEXOL 75 ML: 350 INJECTION, SOLUTION INTRAVENOUS at 05:09

## 2020-09-04 RX ADMIN — BACLOFEN 10 MG: 10 TABLET ORAL at 08:09

## 2020-09-04 RX ADMIN — PHENOBARBITAL 100 MG: 20 ELIXIR ORAL at 08:09

## 2020-09-04 RX ADMIN — POTASSIUM & SODIUM PHOSPHATES POWDER PACK 280-160-250 MG 1 PACKET: 280-160-250 PACK at 07:09

## 2020-09-04 NOTE — CONSULTS
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 Plan was for beside debridment. But upon arrival, the skin was all intact and would saima with pressure. The central portion was dusky, but not an eschar. Encourage pressure off-loading and barrier cream treatments. Appreciate wound care team.     Please call general surgery if surgical debridement is needed in the future. We will sign off at this time.     Zoe Simms MD  General Surgery Resident, PGY III  Pager [phone]

## 2020-09-04 NOTE — PLAN OF CARE
Pt is not medically ready for d/c. Pt has PCA services at home and is current with [facility].    Wound care is recommending a low air loss bed upon d/c. MD placed order.     09/04/20 1511   Discharge Reassessment   Assessment Type Discharge Planning Reassessment   Discharge Plan A Home with family;Home Health   Anticipated Discharge Disposition Home-Health   Post-Acute Status   Post-Acute Authorization Home Health   Home Health Status Awaiting Internal Medical Clearance   Discharge Delays None known at this time

## 2020-09-04 NOTE — PROGRESS NOTES
Patient seen for wound care follow up for bilateral ischial unstageable pressure injuries. Bilateral ischial unstageable pressure injuries with 100% nonviable tissue. Recommend continuing Triad barrier cream BID/prn to assist with moisture management and autolytic debridement.  Recommend re-evaluation by general surgery.   Discussed with Dr. Armenta.    Patient on sizewise immerse surface.  Social work and case management working on support surface for home, Dolphin support surfaces and Sizewise support surfaces not covered by patient's medicaid. Recommend low air loss surface that can be covered by patient's medicaid insurance.    Nursing to continue care. Wound care to follow prn.    Right ischium    Left ischium

## 2020-09-04 NOTE — SUBJECTIVE & OBJECTIVE
Interval History: No AEON.  Afebrile and WBC WNL.  Gen sx rec no sx.  BCXs NGTD.      Review of Systems   Unable to perform ROS: Patient nonverbal     Objective:     Vital Signs (Most Recent):  Temp: 98.3 °F (36.8 °C) (09/04/20 0700)  Pulse: 107 (09/04/20 0936)  Resp: 17 (09/04/20 0936)  BP: (!) 115/57 (09/04/20 0700)  SpO2: 96 % (09/04/20 0936) Vital Signs (24h Range):  Temp:  [98.3 °F (36.8 °C)-99.2 °F (37.3 °C)] 98.3 °F (36.8 °C)  Pulse:  [101-114] 107  Resp:  [17-18] 17  SpO2:  [92 %-96 %] 96 %  BP: (115-118)/(56-64) 115/57     Weight: 33.1 kg (73 lb)  Body mass index is 15.8 kg/m².    Estimated Creatinine Clearance: 108.5 mL/min (based on SCr of 0.5 mg/dL).    Physical Exam  Vitals signs and nursing note reviewed.   Constitutional:       General: He is not in acute distress.     Appearance: He is not diaphoretic.   HENT:      Head: Atraumatic.      Nose: Nose normal.      Mouth/Throat:      Mouth: Mucous membranes are dry.      Comments: Dry cracked lips  Eyes:      General: No scleral icterus.     Conjunctiva/sclera: Conjunctivae normal.   Cardiovascular:      Rate and Rhythm: Regular rhythm. Tachycardia present.      Heart sounds: Normal heart sounds.   Pulmonary:      Effort: Pulmonary effort is normal. No respiratory distress.      Comments: Course breath sounds bilaterally  RA  Abdominal:      General: There is no distension.      Palpations: Abdomen is soft.   Musculoskeletal:      Right lower leg: No edema.      Left lower leg: No edema.   Skin:     Findings: Lesion present. No rash.      Comments: Necrotic ischial decubitus ulcerations   Neurological:      Mental Status: He is alert.         Significant Labs:   Blood Culture:   Recent Labs   Lab 08/08/20  1112 08/26/20  0305 08/26/20  0318 09/01/20  0043 09/01/20  0057   LABBLOO No growth after 5 days.  No growth after 5 days. No growth after 5 days. No growth after 5 days. No Growth to date  No Growth to date  No Growth to date  No Growth to date  No Growth to date  No Growth to date  No Growth to date  No Growth to date     CBC:   Recent Labs   Lab 09/03/20  0809 09/04/20  0508   WBC 9.85 9.51   HGB 7.8* 7.2*   HCT 25.7* 22.7*   * 467*     CMP:   Recent Labs   Lab 09/03/20  0809 09/04/20  0508    136   K 3.8 3.9    103   CO2 28 24   GLU 99 103   BUN 6 7   CREATININE 0.5 0.5   CALCIUM 7.8* 8.0*   PROT 5.6* 5.8*   ALBUMIN 1.5* 1.5*   BILITOT 0.1 <0.1*   ALKPHOS 110 126   AST 24 25   ALT 17 20   ANIONGAP 6* 9   EGFRNONAA >60.0 >60.0     Respiratory Culture:   Recent Labs   Lab 08/26/20  0505 08/31/20  2157   GSRESP <10 epithelial cells per low power field.  Moderate WBC's  Many Gram negative rods  Many Gram negative diplococci <10 epithelial cells per low power field.  Rare WBC's  Rare Gram negative rods   RESPIRATORYC No S aureus isolated.  SERRATIA MARCESCENS  Moderate  Normal respiratory jose also present  *  PSEUDOMONAS AERUGINOSA  Moderate  * No S aureus or Pseudomonas isolated.  SERRATIA MARCESCENS  Moderate  *     Urine Culture:   Recent Labs   Lab 08/26/20  0915   LABURIN No significant growth     Urine Studies:   Recent Labs   Lab 08/09/20  0555 08/26/20  0915   COLORU Yellow Vane   APPEARANCEUA Hazy* Cloudy*   PHUR 6.0 5.0   SPECGRAV >=1.030* 1.025   PROTEINUA 1+* 1+*   GLUCUA Negative Negative   KETONESU Negative Negative   BILIRUBINUA Negative Negative   OCCULTUA 3+* 2+*   NITRITE Negative Negative   UROBILINOGEN Negative  --    LEUKOCYTESUR Negative Trace*   RBCUA 40* 13*   WBCUA 8* 18*   BACTERIA Occasional Occasional   SQUAMEPITHEL  --  1   HYALINECASTS 2* 7*     Wound Culture: No results for input(s): LABAERO in the last 4320 hours.  All pertinent labs within the past 24 hours have been reviewed.    Significant Imaging: I have reviewed all pertinent imaging results/findings within the past 24 hours.   US Lower Extremity Veins Bilateral [[id number]] Resulted: 09/01/20 1204   Order Status: Completed Updated: 09/01/20  1206   Narrative:     EXAMINATION:   US LOWER EXTREMITY VEINS BILATERAL     CLINICAL HISTORY:   r/o DVTs;     TECHNIQUE:   Duplex and color flow Doppler and dynamic compression was performed of the bilateral lower extremity veins was performed.     COMPARISON:   None     FINDINGS:   Right thigh veins: The common femoral, femoral, popliteal, upper greater saphenous, and deep femoral veins are patent and free of thrombus. The veins are normally compressible and have normal phasic flow and augmentation response.     Right calf veins: The visualized calf veins are patent.     Left thigh veins: The common femoral, femoral, popliteal, upper greater saphenous, and deep femoral veins are patent and free of thrombus. The veins are normally compressible and have normal phasic flow and augmentation response.     Left calf veins: The visualized calf veins are patent.     There is a heterogeneous collection within the right lateral leg measuring 3.8 x 1.7 x 3.0 cm.  No drainable collection.    Impression:       No evidence of deep venous thrombosis in either lower extremity.     Heterogeneous collection within the lateral aspect of the right lower extremity at the level of the hip, which may represent a small hematoma.  No focal drainable fluid collection.     Electronically signed by resident: Erika Baxter   Date: 09/01/2020   Time: 11:57     Electronically signed by: Reji Ray MD   Date: 09/01/2020   Time: 12:04   US Upper Extremity Veins Right [[id number]] (Abnormal) Resulted: 09/01/20 1202   Order Status: Completed Updated: 09/01/20 1205   Narrative:     EXAMINATION:   US UPPER EXTREMITY VEINS LEFT; US UPPER EXTREMITY VEINS RIGHT     CLINICAL HISTORY:   r/o dvts;; ? dvt;     TECHNIQUE:   Duplex and color flow Doppler evaluation and dynamic compression was performed of the right and left upper extremity veins.     COMPARISON:   None     FINDINGS:   Right central veins: The internal jugular, subclavian, and axillary  veins are patent and free of thrombus.     Right arm veins: The brachial, and basilic veins are patent and compressible.  The cephalic vein is not visualized and occlusion is not entirely excluded.     Left central veins: The internal jugular, subclavian, and axillary veins are patent and free of thrombus.     Left arm veins: The brachial and cephalic veins are patent and compressible, noting that there is partial obscuration by overlying bandage.     There is a peripheral venous catheter within the left basilic vein.  There is surrounding occlusive thrombus and expansion of the vein.     Miscellaneous: N/A    Impression:       Thrombosis of the left basilic vein surrounding a peripheral venous catheter with expansion of the vein.     The right cephalic vein is not visualized and occlusion is not entirely excluded.     No other thrombus is visualized.     This report was flagged in Epic as abnormal.     Electronically signed by resident: Erika Baxter   Date: 09/01/2020   Time: 11:49     Electronically signed by: Reji Ray MD   Date: 09/01/2020   Time: 12:02   US Upper Extremity Veins Left [[id number]] (Abnormal) Resulted: 09/01/20 1202   Order Status: Completed Updated: 09/01/20 1205   Narrative:     EXAMINATION:   US UPPER EXTREMITY VEINS LEFT; US UPPER EXTREMITY VEINS RIGHT     CLINICAL HISTORY:   r/o dvts;; ? dvt;     TECHNIQUE:   Duplex and color flow Doppler evaluation and dynamic compression was performed of the right and left upper extremity veins.     COMPARISON:   None     FINDINGS:   Right central veins: The internal jugular, subclavian, and axillary veins are patent and free of thrombus.     Right arm veins: The brachial, and basilic veins are patent and compressible.  The cephalic vein is not visualized and occlusion is not entirely excluded.     Left central veins: The internal jugular, subclavian, and axillary veins are patent and free of thrombus.     Left arm veins: The brachial and cephalic  veins are patent and compressible, noting that there is partial obscuration by overlying bandage.     There is a peripheral venous catheter within the left basilic vein.  There is surrounding occlusive thrombus and expansion of the vein.     Miscellaneous: N/A    Impression:       Thrombosis of the left basilic vein surrounding a peripheral venous catheter with expansion of the vein.     The right cephalic vein is not visualized and occlusion is not entirely excluded.     No other thrombus is visualized.     This report was flagged in Epic as abnormal.     Electronically signed by resident: Erika Baxter   Date: 09/01/2020   Time: 11:49     Electronically signed by: Reji Ray MD   Date: 09/01/2020   Time: 12:02   X-Ray Chest 1 View [[id number]] Resulted: 09/01/20 0959   Order Status: Completed Updated: 09/01/20 1002   Narrative:     EXAMINATION:   XR CHEST 1 VIEW     CLINICAL HISTORY:   eval pneumonia;     TECHNIQUE:   Single frontal view of the chest was performed.     COMPARISON:   Chest radiographs: 08/30/2020.  08/28/2020.  08/26/2020.     Chest CTA: 08/05/2020.     FINDINGS:   Mediastinal structures are midline.  Mediastinal contours are stable.  Cardiac silhouette and pulmonary vascular distribution are normal.     Lung volumes are normal and symmetric. I detect no pulmonary disease, pleural fluid, lymph node enlargement, cardiac decompensation, pneumothorax, pneumomediastinum, pneumoperitoneum or significant osseous abnormality.    Impression:       No pneumonia or other acute disease identified.       Electronically signed by: Janett Rosales MD   Date: 09/01/2020   Time: 09:59   X-Ray Chest 1 View [[id number]] Resulted: 08/30/20 1143   Order Status: Completed Updated: 08/30/20 1146   Narrative:     EXAMINATION:   XR CHEST 1 VIEW     CLINICAL HISTORY:   Pneumonia.     TECHNIQUE:   Single frontal view of the chest was performed.     COMPARISON:   Multiple prior radiographs of the chest, most recent from  08/28/2020.     FINDINGS:   The lungs are well expanded and clear. No focal opacities are seen. The pleural spaces are clear.  The cardiac silhouette is unremarkable.  The visualized osseous structures are unremarkable.    Impression:       No acute cardiopulmonary abnormality.       Electronically signed by: Sanford James   Date: 08/30/2020   Time: 11:43   X-Ray Chest AP Portable [[id number]] Resulted: 08/28/20 1353   Order Status: Completed Updated: 08/28/20 1356   Narrative:     EXAMINATION:   XR CHEST AP PORTABLE     CLINICAL HISTORY:   cough;     TECHNIQUE:   Single frontal view of the chest was performed.     COMPARISON:   Chest radiograph: 08/27/2020.     Chest CT: 08/05/2020.     FINDINGS:   Interval extubation and removal of NG tube.     Mediastinal structures are midline. Cardiac silhouette and pulmonary vascular distribution are normal.     Lung volumes are normal and symmetric. I detect no pulmonary disease, pleural fluid, lymph node enlargement, cardiac decompensation, pneumothorax, pneumomediastinum, pneumoperitoneum or significant osseous abnormality.    Impression:       No convincing evidence of disease.  No source for cough identified.       Electronically signed by: Janett Rosales MD   Date: 08/28/2020   Time: 13:53   Imaging History    2020  Date Procedure Name Status Accession Number Location   09/01/20 11:47 AM US Lower Extremity Veins Bilateral Final [id number] [facility]   09/01/20 11:46 AM [facility] Upper Extrem[id number] [facility]   09/01/20 11:46 AM US Upper Extremity Veins Left Final [id number] [facility]   09/01/20 09:54 AM X-Ray Chest 1 View Final [id number] [facility]   08/30/20 11:25 AM X-Ray Chest 1 View Final [id number] [facility]   08/28/20 01:50 PM X-Ray Chest AP Portable Final [id number] [facility]   08/27/20 04:42 AM [facility]Ray Chest 1 View [facility] [id number] [facility]   08/26/20 10:30 AM X-Ray Abdomen AP 1 View (KUB) [facility] [id number] [facility]   08/26/20 08:28 AM X-Ray Abdomen AP 1 View (KUB) Final [id number] [facility]    08/26/20 03:30 AM X-Ray Chest 1 View Final [id number] [facility]   08/14/20 11:20 AM X-Ray Chest 1 View Final [id number] [facility]   08/11/20 01:44 PM CT Abdomen Pelvis With Contrast Final [id number] [facility]   08/09/20 07:38 AM X-Ray Chest 1 View Final [id number] [facility]   08/07/20 10:14 AM X-Ray Chest 1 View Final [id number] [facility]   08/05/20 09:15 AM CTA Chest Non-Coronary Final [id number] [facility]   08/04/20 12:56 PM X-Ray Chest AP Portable Final [id number] [facility]   08/04/20 12:00 AM CARDIAC MONITORING STRIPS Final     08/04/20 12:00 AM CARDIAC MONITORING STRIPS Final     08/26/20 11:20 AM Echo Color Flow Doppler? Yes Final [id number] [facility]   08/10/20 01:52 PM Echo Color Flow Doppler? Yes; Bubble Contrast? No Final [id number] [facility]

## 2020-09-04 NOTE — PROGRESS NOTES
Progress Note   Hospital Medicine         Patient Name: Glen Moscoso  MRN:  [id number]  Hospital Medicine Team: [facility] [place]OS[facility] MED K Quiana Armenta MD  Date of Admission:  8/26/2020     Length of Stay:  LOS: 9 days   Expected Discharge Date: 9/9/2020  Principal Problem:  Acute respiratory failure with hypoxia       Subjective:     Interval History/Overnight Events:  Patient in similar state as prevoius day, mom at bedside. Updated her on ID recs from yesterday regarding CT scans still pending now to assess further. C dif was negative, requested home neurontin and bendaryl qHS added for sleep as hes not sleeping well here now. gen surg evaluated but per wound care discussion today, the wound is definitely open so likely looked at the sites that were not the ones to assess that wound care is concerned about. Will discuss with gen surg again after CT results so we know the depth of issue now as if bone is involved on CT then will likely need more than bedside debridement so will plan to talk to gen surg again tomorrow once nura is resulted. Hg has downtrended likely from inflammation from the sacral wounds, may likely get close enough to need tx tomorrow.  Fevers have improved on zosyn, as the serratia was resistant on repeat sputum Cx.  Passed along mom's # to ID team to ensure they can let her know when rounding if she happens to be downtstiars in lobby as this likely happened yesterday.   Will add immodium per G tube. May consider switching feeds if persits but he has already had feeds switched from home feeds afer discussion with mom used diff feeds at home        Review of Systems   Unable to assess secondary to baseline cognitive status.  All other systems reviewed and are negative.    Objective:     Temp:  [98.3 °F (36.8 °C)-99.2 °F (37.3 °C)]   Pulse:  [101-114]   Resp:  [18]   BP: (115-118)/(56-64)   SpO2:  [92 %-95 %]       Physical Exam:  Constitutional: Appears underweight, chronically ill baseline. Nonverbal.  Contractures.  Head: Normocephalic and atraumatic.   Mouth/Throat: Oropharynx is clear and moist.   Eyes: EOM are normal. Pupils are equal, round, and reactive to light. No scleral icterus.   Neck: Normal range of motion. Neck supple. some contracture in neck.  Cardiovascular: Normal rate and regular rhythm.  No murmur heard.  Pulmonary/Chest: Effort normal and breath sounds normal. Coarse breath sounds. On room air.   Abdominal: Soft. Bowel sounds are normal.  No distension or tenderness. G tube.  Musculoskeletal: muscle wasting. Contractures of both legs. No edema.  Neurological: not alert to place or person, baseline status. No gross abnormalities on minimal neuro exam- moving all 4 extremities. Cannot assess cranial nerves or sensation. bedbound baseline.  Skin: Skin is warm and dry. sacral ulcerations with full thickness breakdown seen with black nonviable tissue (See wound care pictures). Keloid type changes on bilatearl hips. Peripheral IV in left arm.  Psychiatric: Normal mood and affect. Behavior is normal.     Recent Labs   Lab 08/30/20  0638 08/31/20  0213 09/01/20  0045 09/02/20  0252 09/03/20  0809 09/04/20  0508   WBC 9.51 8.00 9.52 9.08 9.85 9.51   HGB 8.3* 8.5* 8.9* 7.8* 7.8* 7.2*   HCT 27.6* 28.8* 29.5* 25.4* 25.7* 22.7*   * 367* 388* 396* 408* 467*     Recent Labs   Lab 09/01/20  0045 09/01/20  1223 09/02/20  0252 09/03/20  0809     --  139 138   K 3.5 4.5 4.3 3.8     --  107 104   CO2 25  --  26 28   BUN 5*  --  5* 6   CREATININE 0.6  --  0.5 0.5   *  --  90 99   CALCIUM 7.5*  --  7.5* 7.8*   MG 1.8  --  1.5* 1.7   PHOS 1.2* 3.0 2.2* 2.2*     Recent Labs   Lab 09/01/20  0045 09/02/20  0252 09/03/20  0809   ALKPHOS 119 102 110   ALT 18 15 17   AST 21 29 24   ALBUMIN 1.8* 1.6* 1.5*   PROT 6.1 5.5* 5.6*   BILITOT 0.1 0.2 0.1   INR 1.1  --   --      Recent Labs   Lab 08/30/20  1728 08/30/20  2159 08/31/20  0803 08/31/20  1233 08/31/20  1829 08/31/20  2049   POCTGLUCOSE 83 88  81 127* 107 181*        baclofen  10 mg Per G Tube TID    heparin (porcine)  5,000 Units Subcutaneous Q12H    Lactobacillus rhamnosus GG  1 capsule Per G Tube Daily    PHENobarbitaL  100 mg Per G Tube QHS    piperacillin-tazobactam (ZOSYN) IVPB  4.5 g Intravenous Q8H    potassium, sodium phosphates  1 packet Per G Tube QID (AC & HS)    propranoloL  20 mg Per G Tube TID       Assessment and Plan     Mr. Glen Moscoso is a 24 y.o. male who presented to [facility] on 8/26/2020 with     Acute respiratory failure with hypoxia  Serratia/Pseudomonas Pneumonia--> with reoccurrent Resistant pneumonia     · Patient intubated upon transfer from outside facility and extubated 8/27  ·  Patient found to have pneumonia and felt cause of respiratory failure, treated with course of cipro, stepped back up to ICU 8/31 for worse resp fx and stridor, improved with chin thrust manuevers but still required extra stay, requires frequent suctioning for this  · Sputum Cx growing serratia again on 8/31 while on treatment with cipro, still febrile every day to 101 multiple times, sputum less per mom and CXR improved but still has episodes likely aspiration with increased HR, and fever, on RA with sats 92-98% now. Sensitivities returned on 9/3 with Cipro resistant now, zosyn started, ID following, recommend to continue, obtain CT chest and CTA chest. Repeat resp Cx ordered.               Biphasic stridor  · Patient with acute development of stridor after extubation of 8/27 but acutely worsened on 8/30.   · Likely mechanical due to neck muscle weakness related to his advance cerebral palsy as improved when patient was sat up and chin was pulled up) and recent acute illnesses but also likely a degree of airway inflammation as contributing to upper air obstruction.   · Patient should be at 90 degree angle at all times to help with breathing mechanics and will monitor.  · Stepped back to ICU 8/31 for this, requires frequent suctioning, jaw  thrust improves this, mom reports improving now on step down 9/3, continue nebs and supportive care  ·      Pneumonia due to Pseudomonas aerginuosa and Serratia marcesens  · Patient admitted with acute hypoxic respiratory failure and sepsis. Blood and urine cultures negative and C. Diff negative but respiratory culture grew Serratia and Pseudomonas. Procal improved since initiation of antibiotics and WBC normalized and sepsis resolved (procal 4--. 3)  -see above for acute resp failure, ID consulted 9/3 for 8/31 serratia with FQ resistance now, Zosyn started. Continue now.         Encephalopathy acute  · Improved, at baseline on step down 9/3 per mom, non verbal at baseline but interacts with his mom somewhat  · Patient with decreased mentation from baseline on admit. Patient transferred to [facility] ICU for further evaluation. EEG done and showed no status epilepticus and encephalopathy felt related to sepsis/infection.     Decubitus ulcer of left buttock, stage 2  Deep tissue injury  Wounds present on admit an wound care- mom reports had very bad stool issues a month ago causing these worsening wounds prior to admit  -wound care recs triad cream to bilatearl ischium, scrotum, r wrist pain daily with betadine  -nonviable tissue to sacrum (See pictures)  -gen surg consultd 9/3 for debridmenet, consulted, feel tissue does not need debridement now on exam but likely were looking at area that wound care was not concerned about as wound care called 9/4 and stated this, will talk to them again after CT results below.  -CT abdomen/pelvis pending per ID recs to assess underlying area to ensure no Osteomyelitis in bones of pelvis  Continue to Turn every 2 hrs. Pad bony prominences with pillows or foam dressings.       Seizure disorder  Chronic and controlled. EEG done on this admit showed no status epilepticus.   -Continue Phenobarbital 100 mg nightly via G tube to treat as patient takes at home         Anemia of chronic  disease  Chronic and controlled. Monitor with daily CBC and transfuse if Hgb < 7.   -Hg between 7-8 now but has has had slow downtick to lower 7s now. Close to transfusion threshold on 9/4, may require tomorrow. No signs of active bleeding.        Hypokalemia  Hypophosphatemia  -replace PRN          Hypernatremia  · Patient developed hypernatremia in ICU related to normal saline infusion and free water depletion. Normal saline infusion stopped on am of 8/29 when sodium got up to 150.  · Had issuse with lower Na with free water flushes so stopped in ICU on step up 8/31, currently normal Na on TF without extra free water              Severe protein-calorie malnutrition  · Patient with obvious muscle wasting and BMI of 15.8. Nutrition consulted on admit.   · Patient started on TF with Peptamen 1.5 with prebio at 40 cc/hr.         Cerebral palsy  Patient with poor functional baseline and bed bound with extremity contractures and total care at home.    Line associated UE clot  -line removed after Left basilic thrombus found in fever work up. RIght cephalic not well visualized, cannot rule out  -as line associated, 3 months anticoag recommmended usually. On heparin now, likely can convert to NOAC     Diarrhea  -C dff negative  -will add immodium to see if improves this  -tube feeds could be contributor but have changed since home and diarrhea has persisted since then    Sleep Disturbance  -resumed home bendadryl and neurontin qhs          Diet:  Tube feeds- peptamen   DVT PPx:  TEDS/SCDS             Disposition:  Pending fever work up, ID recs, CT imaging is pending of chest abdomen pelvis   Talk to gen surg after CT abd pelvis again  Likely stay through weekend  Ultimate plan is HH with mom with DME and close follow ups  Mom # if not in room for MD exams she is in lobby, stays 24/7, call her cell at [phone] and she will return immediatley    Discharge Planning   ROCÍO: 9/9/2020     Code Status: Full Code   Is the  patient medically ready for discharge?: No    Reason for patient still in hospital (select all that apply): Patient unstable  Discharge Plan A: Home with family, Home Health

## 2020-09-04 NOTE — PLAN OF CARE
Pt not medically ready for d/c. Pt receives in [facility]       09/04/20 8909   Discharge Reassessment   Assessment Type Discharge Planning Reassessment   Do you have any problems affording any of your prescribed medications? No   Discharge Plan A Home with family   Anticipated Discharge Disposition Home   Post-Acute Status   Post-Acute Authorization Other   Other Status No Post-Acute Service Needs   Discharge Delays None known at this time

## 2020-09-05 LAB
ALBUMIN SERPL BCP-MCNC: 1.6 G/DL (ref 3.5–5.2)
ALP SERPL-CCNC: 166 U/L (ref 55–135)
ALT SERPL W/O P-5'-P-CCNC: 23 U/L (ref 10–44)
ANION GAP SERPL CALC-SCNC: 7 MMOL/L (ref 8–16)
AST SERPL-CCNC: 27 U/L (ref 10–40)
BASOPHILS # BLD AUTO: 0.04 K/UL (ref 0–0.2)
BASOPHILS NFR BLD: 0.5 % (ref 0–1.9)
BILIRUB SERPL-MCNC: 0.1 MG/DL (ref 0.1–1)
BUN SERPL-MCNC: 6 MG/DL (ref 6–20)
CALCIUM SERPL-MCNC: 8.3 MG/DL (ref 8.7–10.5)
CHLORIDE SERPL-SCNC: 107 MMOL/L (ref 95–110)
CO2 SERPL-SCNC: 27 MMOL/L (ref 23–29)
CREAT SERPL-MCNC: 0.5 MG/DL (ref 0.5–1.4)
DIFFERENTIAL METHOD: ABNORMAL
EOSINOPHIL # BLD AUTO: 0 K/UL (ref 0–0.5)
EOSINOPHIL NFR BLD: 0 % (ref 0–8)
ERYTHROCYTE [DISTWIDTH] IN BLOOD BY AUTOMATED COUNT: 17.4 % (ref 11.5–14.5)
EST. GFR  (AFRICAN AMERICAN): >60 ML/MIN/1.73 M^2
EST. GFR  (NON AFRICAN AMERICAN): >60 ML/MIN/1.73 M^2
GLUCOSE SERPL-MCNC: 109 MG/DL (ref 70–110)
HCT VFR BLD AUTO: 24.7 % (ref 40–54)
HGB BLD-MCNC: 7.5 G/DL (ref 14–18)
IMM GRANULOCYTES # BLD AUTO: 0.03 K/UL (ref 0–0.04)
IMM GRANULOCYTES NFR BLD AUTO: 0.4 % (ref 0–0.5)
LYMPHOCYTES # BLD AUTO: 1.3 K/UL (ref 1–4.8)
LYMPHOCYTES NFR BLD: 16.6 % (ref 18–48)
MAGNESIUM SERPL-MCNC: 1.7 MG/DL (ref 1.6–2.6)
MCH RBC QN AUTO: 29.4 PG (ref 27–31)
MCHC RBC AUTO-ENTMCNC: 30.4 G/DL (ref 32–36)
MCV RBC AUTO: 97 FL (ref 82–98)
MONOCYTES # BLD AUTO: 0.8 K/UL (ref 0.3–1)
MONOCYTES NFR BLD: 10.7 % (ref 4–15)
NEUTROPHILS # BLD AUTO: 5.5 K/UL (ref 1.8–7.7)
NEUTROPHILS NFR BLD: 71.8 % (ref 38–73)
NRBC BLD-RTO: 0 /100 WBC
PHOSPHATE SERPL-MCNC: 3.9 MG/DL (ref 2.7–4.5)
PLATELET # BLD AUTO: 569 K/UL (ref 150–350)
PMV BLD AUTO: 10 FL (ref 9.2–12.9)
POCT GLUCOSE: 104 MG/DL (ref 70–110)
POTASSIUM SERPL-SCNC: 3.9 MMOL/L (ref 3.5–5.1)
PROT SERPL-MCNC: 6.3 G/DL (ref 6–8.4)
RBC # BLD AUTO: 2.55 M/UL (ref 4.6–6.2)
SODIUM SERPL-SCNC: 141 MMOL/L (ref 136–145)
WBC # BLD AUTO: 7.66 K/UL (ref 3.9–12.7)

## 2020-09-05 PROCEDURE — 11000001 HC ACUTE MED/SURG PRIVATE ROOM

## 2020-09-05 PROCEDURE — 63600175 PHARM REV CODE 636 W HCPCS: Performed by: STUDENT IN AN ORGANIZED HEALTH CARE EDUCATION/TRAINING PROGRAM

## 2020-09-05 PROCEDURE — 99233 SBSQ HOSP IP/OBS HIGH 50: CPT | Mod: ,,, | Performed by: HOSPITALIST

## 2020-09-05 PROCEDURE — 25000003 PHARM REV CODE 250: Performed by: STUDENT IN AN ORGANIZED HEALTH CARE EDUCATION/TRAINING PROGRAM

## 2020-09-05 PROCEDURE — 83735 ASSAY OF MAGNESIUM: CPT

## 2020-09-05 PROCEDURE — 63600175 PHARM REV CODE 636 W HCPCS: Performed by: HOSPITALIST

## 2020-09-05 PROCEDURE — 99900035 HC TECH TIME PER 15 MIN (STAT)

## 2020-09-05 PROCEDURE — 36415 COLL VENOUS BLD VENIPUNCTURE: CPT

## 2020-09-05 PROCEDURE — 99233 PR SUBSEQUENT HOSPITAL CARE,LEVL III: ICD-10-PCS | Mod: ,,, | Performed by: HOSPITALIST

## 2020-09-05 PROCEDURE — 25000003 PHARM REV CODE 250: Performed by: PHYSICIAN ASSISTANT

## 2020-09-05 PROCEDURE — 85025 COMPLETE CBC W/AUTO DIFF WBC: CPT

## 2020-09-05 PROCEDURE — 94668 MNPJ CHEST WALL SBSQ: CPT

## 2020-09-05 PROCEDURE — 25000003 PHARM REV CODE 250: Performed by: HOSPITALIST

## 2020-09-05 PROCEDURE — 84100 ASSAY OF PHOSPHORUS: CPT

## 2020-09-05 PROCEDURE — 80053 COMPREHEN METABOLIC PANEL: CPT

## 2020-09-05 PROCEDURE — 94761 N-INVAS EAR/PLS OXIMETRY MLT: CPT

## 2020-09-05 RX ADMIN — BACLOFEN 10 MG: 10 TABLET ORAL at 09:09

## 2020-09-05 RX ADMIN — DIPHENHYDRAMINE HYDROCHLORIDE 12.5 MG: 25 SOLUTION ORAL at 09:09

## 2020-09-05 RX ADMIN — HEPARIN SODIUM 5000 UNITS: 5000 INJECTION INTRAVENOUS; SUBCUTANEOUS at 09:09

## 2020-09-05 RX ADMIN — Medication 1 CAPSULE: at 08:09

## 2020-09-05 RX ADMIN — POTASSIUM & SODIUM PHOSPHATES POWDER PACK 280-160-250 MG 1 PACKET: 280-160-250 PACK at 04:09

## 2020-09-05 RX ADMIN — POTASSIUM & SODIUM PHOSPHATES POWDER PACK 280-160-250 MG 1 PACKET: 280-160-250 PACK at 09:09

## 2020-09-05 RX ADMIN — HEPARIN SODIUM 5000 UNITS: 5000 INJECTION INTRAVENOUS; SUBCUTANEOUS at 08:09

## 2020-09-05 RX ADMIN — PIPERACILLIN SODIUM AND TAZOBACTAM SODIUM 4.5 G: 4; .5 INJECTION, POWDER, LYOPHILIZED, FOR SOLUTION INTRAVENOUS at 12:09

## 2020-09-05 RX ADMIN — PHENOBARBITAL 100 MG: 20 ELIXIR ORAL at 09:09

## 2020-09-05 RX ADMIN — POTASSIUM & SODIUM PHOSPHATES POWDER PACK 280-160-250 MG 1 PACKET: 280-160-250 PACK at 06:09

## 2020-09-05 RX ADMIN — BACLOFEN 10 MG: 10 TABLET ORAL at 04:09

## 2020-09-05 RX ADMIN — PROPRANOLOL HYDROCHLORIDE 20 MG: 20 TABLET ORAL at 08:09

## 2020-09-05 RX ADMIN — BACLOFEN 10 MG: 10 TABLET ORAL at 08:09

## 2020-09-05 RX ADMIN — PIPERACILLIN SODIUM AND TAZOBACTAM SODIUM 4.5 G: 4; .5 INJECTION, POWDER, LYOPHILIZED, FOR SOLUTION INTRAVENOUS at 08:09

## 2020-09-05 RX ADMIN — PROPRANOLOL HYDROCHLORIDE 20 MG: 20 TABLET ORAL at 04:09

## 2020-09-05 RX ADMIN — PROPRANOLOL HYDROCHLORIDE 20 MG: 20 TABLET ORAL at 09:09

## 2020-09-05 RX ADMIN — LOPERAMIDE HYDROCHLORIDE 2 MG: 2 CAPSULE ORAL at 08:09

## 2020-09-05 RX ADMIN — ACETAMINOPHEN 650 MG: 325 TABLET ORAL at 09:09

## 2020-09-05 RX ADMIN — PIPERACILLIN SODIUM AND TAZOBACTAM SODIUM 4.5 G: 4; .5 INJECTION, POWDER, LYOPHILIZED, FOR SOLUTION INTRAVENOUS at 04:09

## 2020-09-05 RX ADMIN — POTASSIUM & SODIUM PHOSPHATES POWDER PACK 280-160-250 MG 1 PACKET: 280-160-250 PACK at 12:09

## 2020-09-05 RX ADMIN — LOPERAMIDE HYDROCHLORIDE 2 MG: 2 CAPSULE ORAL at 09:09

## 2020-09-05 NOTE — PLAN OF CARE
Problem: Adult Inpatient Plan of Care  Goal: Plan of Care Review  Outcome: Ongoing, Progressing  Goal: Patient-Specific Goal (Individualization)  Description: Admit Date: 8/26    Admit Dx: Seizures    Past Medical History:  No date: Acid reflux  No date: Cerebral palsy  No date: History of hip surgery  No date: S/P percutaneous endoscopic gastrostomy (PEG) tube placement  No date: Seizures    History reviewed. No pertinent surgical history.    Individualization:   1. Pt likes legs bent and arms elevated    Restraints: N/A         Outcome: Ongoing, Progressing  Goal: Absence of Hospital-Acquired Illness or Injury  Outcome: Ongoing, Progressing  Goal: Optimal Comfort and Wellbeing  Outcome: Ongoing, Progressing  Goal: Readiness for Transition of Care  Outcome: Ongoing, Progressing  Goal: Rounds/Family Conference  Outcome: Ongoing, Progressing     Problem: Communication Impairment (Mechanical Ventilation, Invasive)  Goal: Effective Communication  Outcome: Ongoing, Progressing     Problem: Device-Related Complication Risk (Mechanical Ventilation, Invasive)  Goal: Optimal Device Function  Outcome: Ongoing, Progressing

## 2020-09-05 NOTE — PROGRESS NOTES
Progress Note   Hospital Medicine         Patient Name: Glen Moscoso  MRN:  [id number]  Hospital Medicine Team: [facility] [place] HOSP MED K Quiana Armenta MD  Date of Admission:  8/26/2020     Length of Stay:  LOS: 10 days   Expected Discharge Date: 9/9/2020  Principal Problem:  Acute respiratory failure with hypoxia       Subjective:     Interval History/Overnight Events:  Fairly stable from resp standpoint since starting zosyn on step down, no more stridor and has never been on oxygen again, fevers resolved with this, Mg mildly low, replace now, 2 BMs yesterday,added a cyndee immodium to see if helps, mom at bedside sait it has. Hg is stable around 7.5 now. CT scans showed protocolitis type changes, similar to early august when he had many BMs at that time, unclear if CT may be lagging as clinically improved coiltis type picture, likely zosyn would be covering this as well if he did have a colitis picture on admit, would see ID thoughts also. Complex fluid in pelvis of unclear etiology, similar to previous, hector also see ID thoughts if needs addressing or would trend as it seems his sepsis source may have been serratia that became resistant. No signs of osteo I can see on the read. Chronic thickening bladder likely from rizvi from CP. Has the GGO in Right lung, this is likely the pneumonia weve been treating. Will ask surg about assessing the ulcerations seen on 9/4 per wound care recs.   If things stay on this track, and no other major things based on the above in the next coming days, I think he could be potentially a HH DC this upcoming week which I let his mom know.        Review of Systems   Unable to assess secondary to baseline cognitive status.  All other systems reviewed and are negative.    Objective:     Temp:  [97 °F (36.1 °C)-99.5 °F (37.5 °C)]   Pulse:  []   Resp:  [16-20]   BP: (107-170)/(55-97)   SpO2:  [94 %-97 %]       Physical Exam:  Constitutional: Appears underweight, chronically ill baseline. Nonverbal.  Contractures.  Head: Normocephalic and atraumatic.   Mouth/Throat: Oropharynx is clear and moist.   Eyes: EOM are normal. Pupils are equal, round, and reactive to light. No scleral icterus.   Neck: Normal range of motion. Neck supple. some contracture in neck.  Cardiovascular: Normal rate and regular rhythm.  No murmur heard.  Pulmonary/Chest: Effort normal and breath sounds normal. Coarse breath sounds. On room air.   Abdominal: Soft. Bowel sounds are normal.  No distension or tenderness. G tube.  Musculoskeletal: muscle wasting. Contractures of both legs. No edema.  Neurological: not alert to place or person, baseline status. No gross abnormalities on minimal neuro exam- moving all 4 extremities. Cannot assess cranial nerves or sensation. bedbound baseline.  Skin: Skin is warm and dry. sacral ulcerations with full thickness breakdown seen with black nonviable tissue (See wound care pictures). Keloid type changes on bilatearl hips. Peripheral IV in left arm.  Psychiatric: Normal mood and affect. Behavior is normal.     Recent Labs   Lab 08/31/20  0213 09/01/20  0045 09/02/20  0252 09/03/20  0809 09/04/20  0508 09/05/20  0413   WBC 8.00 9.52 9.08 9.85 9.51 7.66   HGB 8.5* 8.9* 7.8* 7.8* 7.2* 7.5*   HCT 28.8* 29.5* 25.4* 25.7* 22.7* 24.7*   * 388* 396* 408* 467* 569*     Recent Labs   Lab 09/03/20  0809 09/04/20  0508 09/05/20  0413    136 141   K 3.8 3.9 3.9    103 107   CO2 28 24 27   BUN 6 7 6   CREATININE 0.5 0.5 0.5   GLU 99 103 109   CALCIUM 7.8* 8.0* 8.3*   MG 1.7 1.6 1.7   PHOS 2.2* 2.8 3.9     Recent Labs   Lab 09/01/20  0045  09/03/20  0809 09/04/20  0508 09/05/20  0413   ALKPHOS 119   < > 110 126 166*   ALT 18   < > 17 20 23   AST 21   < > 24 25 27   ALBUMIN 1.8*   < > 1.5* 1.5* 1.6*   PROT 6.1   < > 5.6* 5.8* 6.3   BILITOT 0.1   < > 0.1 <0.1* 0.1   INR 1.1  --   --   --   --     < > = values in this interval not displayed.     Recent Labs   Lab 08/30/20  2159 08/31/20  0803  08/31/20  1233 08/31/20  1829 08/31/20  2049 09/04/20  1838   POCTGLUCOSE 88 81 127* 107 181* 81        baclofen  10 mg Per G Tube TID    diphenhydrAMINE  12.5 mg Per G Tube QHS    gabapentin  250 mg Per G Tube QHS    heparin (porcine)  5,000 Units Subcutaneous Q12H    Lactobacillus rhamnosus GG  1 capsule Per G Tube Daily    loperamide  2 mg Per G Tube BID    PHENobarbitaL  100 mg Per G Tube QHS    piperacillin-tazobactam (ZOSYN) IVPB  4.5 g Intravenous Q8H    potassium, sodium phosphates  1 packet Per G Tube QID (AC & HS)    propranoloL  20 mg Per G Tube TID       Assessment and Plan     Mr. Glen Moscoso is a 24 y.o. male who presented to [facility] on 8/26/2020 with     Acute respiratory failure with hypoxia  Serratia/Pseudomonas Pneumonia--> with reoccurrent Resistant pneumonia     · Patient intubated upon transfer from outside facility and extubated 8/27  ·  Patient found to have pneumonia and felt cause of respiratory failure, treated with course of cipro, stepped back up to ICU 8/31 for worse resp fx and stridor, improved with chin thrust manuevers but still required extra stay, requires frequent suctioning for this  · Sputum Cx growing serratia again on 8/31 while on treatment with cipro, still febrile every day to 101 multiple times, sputum less per mom and CXR improved but still has episodes likely aspiration with increased HR, and fever, on RA with sats 92-98% now. Sensitivities returned on 9/3 with Cipro resistant now, zosyn started, ID following, recommend to continue, chest CT with R GOO likely the pna, no clots. Has been on RA since SD, afebrile since zosyn. [place] 1 week zosyn per ID               Biphasic stridor  · Patient with acute development of stridor after extubation of 8/27 but acutely worsened on 8/30.   · Likely mechanical due to neck muscle weakness related to his advance cerebral palsy as improved when patient was sat up and chin was pulled up) and recent acute illnesses but  also likely a degree of airway inflammation as contributing to upper air obstruction.   · Patient should be at 90 degree angle at all times to help with breathing mechanics and will monitor.  · Stepped back to ICU 8/31 for this, requires frequent suctioning, jaw thrust improves this, mom reports improving now on step down 9/3, continue nebs and supportive care  ·      Pneumonia due to Pseudomonas aerginuosa and Serratia marcesens  · Patient admitted with acute hypoxic respiratory failure and sepsis. Blood and urine cultures negative and C. Diff negative but respiratory culture grew Serratia and Pseudomonas. Procal improved since initiation of antibiotics and WBC normalized and sepsis resolved (procal 4--. 3)  -see above for acute resp failure, ID consulted 9/3 for 8/31 serratia with FQ resistance now, Zosyn started. Continue now.   -1 week at least per ID (9/10)        Encephalopathy acute  · Improved, at baseline on step down 9/3 per mom, non verbal at baseline but interacts with his mom somewhat  · Patient with decreased mentation from baseline on admit. Patient transferred to [facility] ICU for further evaluation. EEG done and showed no status epilepticus and encephalopathy felt related to sepsis/infection.     Decubitus ulcer of left buttock, stage 2  Deep tissue injury  Wounds present on admit an wound care- mom reports had very bad stool issues a month ago causing these worsening wounds prior to admit  -wound care recs triad cream to bilatearl ischium, scrotum, r wrist pain daily with betadine  -nonviable tissue to sacrum (See pictures)  -gen surg consultd 9/3 for debridmenet, consulted, feel tissue does not need debridement now on exam but likely were looking at area that wound care was not concerned about as wound care called 9/4 and stated this, will talk to them again after CT results below.  -CT abdomen/pelvis not showing bone involved, will ask gen surg about pictures from 9/4 per wound care recs if this  was area assessed as wound care feels this part is not part discussed in note  Continue to Turn every 2 hrs. Pad bony prominences with pillows or foam dressings.       Seizure disorder  Chronic and controlled. EEG done on this admit showed no status epilepticus.   -Continue Phenobarbital 100 mg nightly via G tube to treat as patient takes at home         Anemia of chronic disease  Chronic and controlled. Monitor with daily CBC and transfuse if Hgb < 7.   -Hg between 7-8 now but has has had slow downtick to lower 7s now. Close to transfusion threshold on 9/4, may require tomorrow. No signs of active bleeding.  -stable 7.5        Hypokalemia  Hypophosphatemia  -replace PRN          Hypernatremia  · Patient developed hypernatremia in ICU related to normal saline infusion and free water depletion. Normal saline infusion stopped on am of 8/29 when sodium got up to 150.  · Had issuse with lower Na with free water flushes so stopped in ICU on step up 8/31, currently normal Na on TF without extra free water   · -no issues on SD again currently             Severe protein-calorie malnutrition  · Patient with obvious muscle wasting and BMI of 15.8. Nutrition consulted on admit.   · Patient started on TF with Peptamen 1.5 with prebio at 40 cc/hr.         Cerebral palsy  Patient with poor functional baseline and bed bound with extremity contractures and total care at home.    Line associated UE clot  -line removed after Left basilic thrombus found in fever work up. RIght cephalic not well visualized, cannot rule out  -as line associated, 3 months anticoag recommmended usually. On heparin now, likely can convert to NOAC     Diarrhea  Protocolitis  -C dff negative  -will add immodium to see if improves this  -tube feeds could be contributor but have changed since home and diarrhea has persisted since then  -seen in CT early august and again 9/4. Diarrhea improved, 2 Bm overnight. May be lagging imaging? Will see if ID has any  concerns about this. On zosyn for pna so in theory would also cover any colitis concerns    Sleep Disturbance  -resumed home bendadryl and neurontin qhs          Diet:  Tube feeds- peptamen   DVT PPx:  TEDS/SCDS             Disposition:  Further ID recs  Talk to gen surg per fernanda care recs again  Likely stay through weekend, but improving, may be suitable for a dc soon this week   Ultimate plan is HH with mom with DME and close follow ups  Mom # if not in room for MD exams she is in lobby, stays 24/7, call her cell at [phone] and she will return immediatley    Discharge Planning   ROCÍO: 9/9/2020     Code Status: Full Code   Is the patient medically ready for discharge?: No    Reason for patient still in hospital (select all that apply): Patient unstable  Discharge Plan A: Home with family, Home Health   Discharge Delays: None known at this time

## 2020-09-05 NOTE — ASSESSMENT & PLAN NOTE
24 year old male with history of cerebral palsy and seizures who presents with encephalopathy, diarrhea and respiratory distress initially requiring intubation. EEG negative for seizures. CXR clear. WBC 22 K on admit.     Blood/urine cx negative. Resp cx grew Pseudomonas and Serratia. He was started on broad spectrum abx and IVF on admit with improvement in mentation and extubated 8/27. ABx deescalated to Cipro on 8/29. Since then he has been having fevers.    He was found to have left basilic vein thrombosis around midline and it was removed. Also with bilateral necrotic ischial ulcerations with fluid collection within the lateral aspect of the right lower extremity at the level of the hip.    Repeat blood cx are sterile. Repeat resp cx with Serratia (now quinolone R). Still with diarrhea. Was febrile, hypoxic and tachycardic. ID consulted for abx recs.    Patient started on zosyn, on RA with O2 Sat WNL. Gen Sx rec no debridement of ischial ulcers.  C Diff neg  Stable and improved    Plan  - Continue IV Zosyn  - Recommend CT C/A/P with contrast to further evaluate his lungs and to ensure no underlying ischial fluid collections/bony erosion - ordered. Although he is on anticoagluation, PE is in differential.   - plan for min 7d of zosyn  - fu CT results   - ID will follow closely.

## 2020-09-05 NOTE — PROGRESS NOTES
[facility]  Infectious Disease  Progress Note    Patient Name: Glen Moscoso  MRN: [id number]  Admission Date: 8/26/2020  Length of Stay: 9 days  Attending Physician: Quiana Armenta MD  Primary Care Provider: Yadi Lawson MD    Isolation Status: Special Contact  Assessment/Plan:      Fever     24 year old male with history of cerebral palsy and seizures who presents with encephalopathy, diarrhea and respiratory distress initially requiring intubation. EEG negative for seizures. CXR clear. WBC 22 K on admit.     Blood/urine cx negative. Resp cx grew Pseudomonas and Serratia. He was started on broad spectrum abx and IVF on admit with improvement in mentation and extubated 8/27. ABx deescalated to Cipro on 8/29. Since then he has been having fevers.    He was found to have left basilic vein thrombosis around midline and it was removed. Also with bilateral necrotic ischial ulcerations with fluid collection within the lateral aspect of the right lower extremity at the level of the hip.    Repeat blood cx are sterile. Repeat resp cx with Serratia (now quinolone R). Still with diarrhea. Was febrile, hypoxic and tachycardic. ID consulted for abx recs.    Patient started on zosyn, on RA with O2 Sat WNL. Gen Sx rec no debridement of ischial ulcers.  C Diff neg  Stable and improved    Plan  - Continue IV Zosyn  - Recommend CT C/A/P with contrast to further evaluate his lungs and to ensure no underlying ischial fluid collections/bony erosion - ordered. Although he is on anticoagluation, PE is in differential.   - plan for min 7d of zosyn  - fu CT results   - ID will follow closely.     Decubitus ulcer of left buttock, stage 2     See above    Pneumonia due to Pseudomonas aerginuosa and Serratia marcesens     See above        Anticipated Disposition: tbd    Thank you for your consult. I will follow-up with patient. Please contact us if you have any additional questions.    Lance Granados,  PA  Infectious Disease  [facility]    Subjective:     Principal Problem:Acute respiratory failure with hypoxia    HPI: Glen Moscoso is a 24 year old male with history of cerebral palsy, seizures, and recent admission for hyponatremia who presents with encephalopathy, respiratory distress and possible seizure. He presented to OSH after being found unresponsive and in resp distress. He was found down by his mother upon EMS arrival he had agonal resp 3-4 per min, he was intubated by EMS and take to OSH ED. He was hypotensive on arrival started on Levo gtt, ABG showed metabolic acidosis. WBC 22K. CXR clear. He was admitted to [facility] on 8/26 for a higher level of care, EGG and close monitoring. Started on empiric Vanc and Zosyn and IVF.     On 8/27 he was extubated. Tube feeds, chest physiotherapy were started. Blood cx and urine cx returned negative. Resp cx grew Serratia and Pseudomonas. C diff testing negative.  EEG did not show any epileptiform activity or discharge. He improved with fluids and abx and returned back to baseline per his mother. Suspect AMS due to dehydration and infection. Leukocytosis resolved. ABx deescalated to Cipro on 8/29. Since then he has been having fevers. He was found to have Left basilic vein thrombosis around midline and it was removed. He completed Cipro on 9/1. Stepped down to the floor yesterday. Given ongoing fevers blood cx repeated and are negative. Sputum cx 8/31 is showing Serratia again. Zosyn started. ID consulted for abx recs. He is on RA. O2 sats in the low 90s. He has multiple necrotic decubitus ulcerations. General surgery consulted. Wound care following.    Of note, he was recently admitted 8/4 - 8/18 for fever, diarrhea and cough. Per brief chart review,  No clear cause of fevers but in the prior two months he has been innudated with antibiotics. Cultures returned negative. Tx with Vanc and Levofloxacin for PNA. Also treated empirically for C diff with oral  flagyl and vanc. Symptoms improved prior to discharge. Fever and leukocytosis resolved.  Interval History: No AEON.  Afebrile and WBC WNL.  Gen sx rec no sx.  BCXs NGTD.      Review of Systems   Unable to perform ROS: Patient nonverbal     Objective:     Vital Signs (Most Recent):  Temp: 98.3 °F (36.8 °C) (09/04/20 0700)  Pulse: 107 (09/04/20 0936)  Resp: 17 (09/04/20 0936)  BP: (!) 115/57 (09/04/20 0700)  SpO2: 96 % (09/04/20 0936) Vital Signs (24h Range):  Temp:  [98.3 °F (36.8 °C)-99.2 °F (37.3 °C)] 98.3 °F (36.8 °C)  Pulse:  [101-114] 107  Resp:  [17-18] 17  SpO2:  [92 %-96 %] 96 %  BP: (115-118)/(56-64) 115/57     Weight: 33.1 kg (73 lb)  Body mass index is 15.8 kg/m².    Estimated Creatinine Clearance: 108.5 mL/min (based on SCr of 0.5 mg/dL).    Physical Exam  Vitals signs and nursing note reviewed.   Constitutional:       General: He is not in acute distress.     Appearance: He is not diaphoretic.   HENT:      Head: Atraumatic.      Nose: Nose normal.      Mouth/Throat:      Mouth: Mucous membranes are dry.      Comments: Dry cracked lips  Eyes:      General: No scleral icterus.     Conjunctiva/sclera: Conjunctivae normal.   Cardiovascular:      Rate and Rhythm: Regular rhythm. Tachycardia present.      Heart sounds: Normal heart sounds.   Pulmonary:      Effort: Pulmonary effort is normal. No respiratory distress.      Comments: Course breath sounds bilaterally  RA  Abdominal:      General: There is no distension.      Palpations: Abdomen is soft.   Musculoskeletal:      Right lower leg: No edema.      Left lower leg: No edema.   Skin:     Findings: Lesion present. No rash.      Comments: Necrotic ischial decubitus ulcerations   Neurological:      Mental Status: He is alert.         Significant Labs:   Blood Culture:   Recent Labs   Lab 08/08/20  1112 08/26/20  0305 08/26/20  0318 09/01/20  0043 09/01/20  0057   LABBLOO No growth after 5 days.  No growth after 5 days. No growth after 5 days. No growth  after 5 days. No Growth to date  No Growth to date  No Growth to date  No Growth to date No Growth to date  No Growth to date  No Growth to date  No Growth to date     CBC:   Recent Labs   Lab 09/03/20  0809 09/04/20  0508   WBC 9.85 9.51   HGB 7.8* 7.2*   HCT 25.7* 22.7*   * 467*     CMP:   Recent Labs   Lab 09/03/20  0809 09/04/20  0508    136   K 3.8 3.9    103   CO2 28 24   GLU 99 103   BUN 6 7   CREATININE 0.5 0.5   CALCIUM 7.8* 8.0*   PROT 5.6* 5.8*   ALBUMIN 1.5* 1.5*   BILITOT 0.1 <0.1*   ALKPHOS 110 126   AST 24 25   ALT 17 20   ANIONGAP 6* 9   EGFRNONAA >60.0 >60.0     Respiratory Culture:   Recent Labs   Lab 08/26/20  0505 08/31/20  2157   GSRESP <10 epithelial cells per low power field.  Moderate WBC's  Many Gram negative rods  Many Gram negative diplococci <10 epithelial cells per low power field.  Rare WBC's  Rare Gram negative rods   RESPIRATORYC No S aureus isolated.  SERRATIA MARCESCENS  Moderate  Normal respiratory jose also present  *  PSEUDOMONAS AERUGINOSA  Moderate  * No S aureus or Pseudomonas isolated.  SERRATIA MARCESCENS  Moderate  *     Urine Culture:   Recent Labs   Lab 08/26/20  0915   LABURIN No significant growth     Urine Studies:   Recent Labs   Lab 08/09/20  0555 08/26/20  0915   COLORU Yellow Vane   APPEARANCEUA Hazy* Cloudy*   PHUR 6.0 5.0   SPECGRAV >=1.030* 1.025   PROTEINUA 1+* 1+*   GLUCUA Negative Negative   KETONESU Negative Negative   BILIRUBINUA Negative Negative   OCCULTUA 3+* 2+*   NITRITE Negative Negative   UROBILINOGEN Negative  --    LEUKOCYTESUR Negative Trace*   RBCUA 40* 13*   WBCUA 8* 18*   BACTERIA Occasional Occasional   SQUAMEPITHEL  --  1   HYALINECASTS 2* 7*     Wound Culture: No results for input(s): LABAERO in the last 4320 hours.  All pertinent labs within the past 24 hours have been reviewed.    Significant Imaging: I have reviewed all pertinent imaging results/findings within the past 24 hours.   US Lower Extremity  Veins Bilateral [[id number]] Resulted: 09/01/20 1204   Order Status: Completed Updated: 09/01/20 1206   Narrative:     EXAMINATION:   US LOWER EXTREMITY VEINS BILATERAL     CLINICAL HISTORY:   r/o DVTs;     TECHNIQUE:   Duplex and color flow Doppler and dynamic compression was performed of the bilateral lower extremity veins was performed.     COMPARISON:   None     FINDINGS:   Right thigh veins: The common femoral, femoral, popliteal, upper greater saphenous, and deep femoral veins are patent and free of thrombus. The veins are normally compressible and have normal phasic flow and augmentation response.     Right calf veins: The visualized calf veins are patent.     Left thigh veins: The common femoral, femoral, popliteal, upper greater saphenous, and deep femoral veins are patent and free of thrombus. The veins are normally compressible and have normal phasic flow and augmentation response.     Left calf veins: The visualized calf veins are patent.     There is a heterogeneous collection within the right lateral leg measuring 3.8 x 1.7 x 3.0 cm.  No drainable collection.    Impression:       No evidence of deep venous thrombosis in either lower extremity.     Heterogeneous collection within the lateral aspect of the right lower extremity at the level of the hip, which may represent a small hematoma.  No focal drainable fluid collection.     Electronically signed by resident: Erika Baxter   Date: 09/01/2020   Time: 11:57     Electronically signed by: Reji Ray MD   Date: 09/01/2020   Time: 12:04   US Upper Extremity Veins Right [[id number]] (Abnormal) Resulted: 09/01/20 1202   Order Status: Completed Updated: 09/01/20 1205   Narrative:     EXAMINATION:   US UPPER EXTREMITY VEINS LEFT; US UPPER EXTREMITY VEINS RIGHT     CLINICAL HISTORY:   r/o dvts;; ? dvt;     TECHNIQUE:   Duplex and color flow Doppler evaluation and dynamic compression was performed of the right and left upper extremity veins.      COMPARISON:   None     FINDINGS:   Right central veins: The internal jugular, subclavian, and axillary veins are patent and free of thrombus.     Right arm veins: The brachial, and basilic veins are patent and compressible.  The cephalic vein is not visualized and occlusion is not entirely excluded.     Left central veins: The internal jugular, subclavian, and axillary veins are patent and free of thrombus.     Left arm veins: The brachial and cephalic veins are patent and compressible, noting that there is partial obscuration by overlying bandage.     There is a peripheral venous catheter within the left basilic vein.  There is surrounding occlusive thrombus and expansion of the vein.     Miscellaneous: N/A    Impression:       Thrombosis of the left basilic vein surrounding a peripheral venous catheter with expansion of the vein.     The right cephalic vein is not visualized and occlusion is not entirely excluded.     No other thrombus is visualized.     This report was flagged in Epic as abnormal.     Electronically signed by resident: Erika Baxter   Date: 09/01/2020   Time: 11:49     Electronically signed by: Reji Ray MD   Date: 09/01/2020   Time: 12:02   US Upper Extremity Veins Left [[id number]] (Abnormal) Resulted: 09/01/20 1202   Order Status: Completed Updated: 09/01/20 1205   Narrative:     EXAMINATION:   US UPPER EXTREMITY VEINS LEFT; US UPPER EXTREMITY VEINS RIGHT     CLINICAL HISTORY:   r/o dvts;; ? dvt;     TECHNIQUE:   Duplex and color flow Doppler evaluation and dynamic compression was performed of the right and left upper extremity veins.     COMPARISON:   None     FINDINGS:   Right central veins: The internal jugular, subclavian, and axillary veins are patent and free of thrombus.     Right arm veins: The brachial, and basilic veins are patent and compressible.  The cephalic vein is not visualized and occlusion is not entirely excluded.     Left central veins: The internal jugular,  subclavian, and axillary veins are patent and free of thrombus.     Left arm veins: The brachial and cephalic veins are patent and compressible, noting that there is partial obscuration by overlying bandage.     There is a peripheral venous catheter within the left basilic vein.  There is surrounding occlusive thrombus and expansion of the vein.     Miscellaneous: N/A    Impression:       Thrombosis of the left basilic vein surrounding a peripheral venous catheter with expansion of the vein.     The right cephalic vein is not visualized and occlusion is not entirely excluded.     No other thrombus is visualized.     This report was flagged in Epic as abnormal.     Electronically signed by resident: Erika Baxter   Date: 09/01/2020   Time: 11:49     Electronically signed by: Reji Ray MD   Date: 09/01/2020   Time: 12:02   X-Ray Chest 1 View [[id number]] Resulted: 09/01/20 0959   Order Status: Completed Updated: 09/01/20 1002   Narrative:     EXAMINATION:   XR CHEST 1 VIEW     CLINICAL HISTORY:   eval pneumonia;     TECHNIQUE:   Single frontal view of the chest was performed.     COMPARISON:   Chest radiographs: 08/30/2020.  08/28/2020.  08/26/2020.     Chest CTA: 08/05/2020.     FINDINGS:   Mediastinal structures are midline.  Mediastinal contours are stable.  Cardiac silhouette and pulmonary vascular distribution are normal.     Lung volumes are normal and symmetric. I detect no pulmonary disease, pleural fluid, lymph node enlargement, cardiac decompensation, pneumothorax, pneumomediastinum, pneumoperitoneum or significant osseous abnormality.    Impression:       No pneumonia or other acute disease identified.       Electronically signed by: Janett Rosales MD   Date: 09/01/2020   Time: 09:59   X-Ray Chest 1 View [[id number]] Resulted: 08/30/20 1143   Order Status: Completed Updated: 08/30/20 1146   Narrative:     EXAMINATION:   XR CHEST 1 VIEW     CLINICAL HISTORY:   Pneumonia.     TECHNIQUE:   Single frontal  view of the chest was performed.     COMPARISON:   Multiple prior radiographs of the chest, most recent from 08/28/2020.     FINDINGS:   The lungs are well expanded and clear. No focal opacities are seen. The pleural spaces are clear.  The cardiac silhouette is unremarkable.  The visualized osseous structures are unremarkable.    Impression:       No acute cardiopulmonary abnormality.       Electronically signed by: Sanford James   Date: 08/30/2020   Time: 11:43   X-Ray Chest AP Portable [[id number]] Resulted: 08/28/20 1353   Order Status: Completed Updated: 08/28/20 1356   Narrative:     EXAMINATION:   XR CHEST AP PORTABLE     CLINICAL HISTORY:   cough;     TECHNIQUE:   Single frontal view of the chest was performed.     COMPARISON:   Chest radiograph: 08/27/2020.     Chest CT: 08/05/2020.     FINDINGS:   Interval extubation and removal of NG tube.     Mediastinal structures are midline. Cardiac silhouette and pulmonary vascular distribution are normal.     Lung volumes are normal and symmetric. I detect no pulmonary disease, pleural fluid, lymph node enlargement, cardiac decompensation, pneumothorax, pneumomediastinum, pneumoperitoneum or significant osseous abnormality.    Impression:       No convincing evidence of disease.  No source for cough identified.       Electronically signed by: Janett Rosales MD   Date: 08/28/2020   Time: 13:53   Imaging History    2020  Date Procedure Name Status Accession Number Location   09/01/20 11:47 AM US Lower Extremity Veins Bilateral Final [id number] [facility]   09/01/20 11:46 AM US Upper Extremity Veins Right Final [id number] [facility]   09/01/20 11:46 AM US Upper Extremity Veins Left Final [id number] [facility]   09/01/20 09:54 AM X-Ray Chest 1 View Final [id number] [facility]   08/30/20 11:25 AM X-Ray Chest 1 View Final [id number] [facility]   08/28/20 01:50 PM X-Ray Chest AP Portable Final [id number] [facility]   08/27/20 04:42 AM X-Ray Chest 1 View Final [id number] [facility]   08/26/20 10:30 AM X-Ray Abdomen AP 1  View (KUB) Final [id number] [facility]   08/26/20 08:28 AM X-Ray Abdomen AP 1 View (KUB) Final [id number] [facility]   08/26/20 03:30 AM X-Ray Chest 1 View Final [id number] [facility]   08/14/20 11:20 AM X-Ray Chest 1 View Final [id number] [facility]   08/11/20 01:44 PM CT Abdomen Pelvis With Contrast Final [id number] [facility]   08/09/20 07:38 AM X-Ray Chest 1 View Final [id number] [facility]   08/07/20 10:14 AM X-Ray Chest 1 View Final [id number] [facility]   08/05/20 09:15 AM CTA Chest Non-Coronary Final [id number] [facility]   08/04/20 12:56 PM X-Ray Chest AP Portable Final [id number] [facility]   08/04/20 12:00 AM CARDIAC MONITORING STRIPS Final     08/04/20 12:00 AM CARDIAC MONITORING STRIPS Final     08/26/20 11:20 AM Echo Color Flow Doppler? Yes Final [id number] [facility]   08/10/20 01:52 PM Echo Color Flow Doppler? Yes; Bubble Contrast? No Final [id number] [facility]

## 2020-09-06 LAB
ALBUMIN SERPL BCP-MCNC: 1.7 G/DL (ref 3.5–5.2)
ALP SERPL-CCNC: 163 U/L (ref 55–135)
ALT SERPL W/O P-5'-P-CCNC: 21 U/L (ref 10–44)
ANION GAP SERPL CALC-SCNC: 7 MMOL/L (ref 8–16)
AST SERPL-CCNC: 20 U/L (ref 10–40)
BACTERIA BLD CULT: NORMAL
BACTERIA BLD CULT: NORMAL
BASOPHILS # BLD AUTO: 0.02 K/UL (ref 0–0.2)
BASOPHILS NFR BLD: 0.3 % (ref 0–1.9)
BILIRUB SERPL-MCNC: 0.2 MG/DL (ref 0.1–1)
BUN SERPL-MCNC: 6 MG/DL (ref 6–20)
CALCIUM SERPL-MCNC: 8.2 MG/DL (ref 8.7–10.5)
CHLORIDE SERPL-SCNC: 105 MMOL/L (ref 95–110)
CO2 SERPL-SCNC: 25 MMOL/L (ref 23–29)
CREAT SERPL-MCNC: 0.5 MG/DL (ref 0.5–1.4)
CRP SERPL-MCNC: 98.5 MG/L (ref 0–8.2)
DIFFERENTIAL METHOD: ABNORMAL
EOSINOPHIL # BLD AUTO: 0 K/UL (ref 0–0.5)
EOSINOPHIL NFR BLD: 0 % (ref 0–8)
ERYTHROCYTE [DISTWIDTH] IN BLOOD BY AUTOMATED COUNT: 17.3 % (ref 11.5–14.5)
EST. GFR  (AFRICAN AMERICAN): >60 ML/MIN/1.73 M^2
EST. GFR  (NON AFRICAN AMERICAN): >60 ML/MIN/1.73 M^2
GLUCOSE SERPL-MCNC: 85 MG/DL (ref 70–110)
HCT VFR BLD AUTO: 27.4 % (ref 40–54)
HGB BLD-MCNC: 8.2 G/DL (ref 14–18)
IMM GRANULOCYTES # BLD AUTO: 0.03 K/UL (ref 0–0.04)
IMM GRANULOCYTES NFR BLD AUTO: 0.4 % (ref 0–0.5)
LACTATE SERPL-SCNC: 1.1 MMOL/L (ref 0.5–2.2)
LYMPHOCYTES # BLD AUTO: 1.4 K/UL (ref 1–4.8)
LYMPHOCYTES NFR BLD: 17.6 % (ref 18–48)
MAGNESIUM SERPL-MCNC: 1.6 MG/DL (ref 1.6–2.6)
MCH RBC QN AUTO: 29.4 PG (ref 27–31)
MCHC RBC AUTO-ENTMCNC: 29.9 G/DL (ref 32–36)
MCV RBC AUTO: 98 FL (ref 82–98)
MONOCYTES # BLD AUTO: 0.9 K/UL (ref 0.3–1)
MONOCYTES NFR BLD: 10.9 % (ref 4–15)
NEUTROPHILS # BLD AUTO: 5.5 K/UL (ref 1.8–7.7)
NEUTROPHILS NFR BLD: 70.8 % (ref 38–73)
NRBC BLD-RTO: 0 /100 WBC
PHOSPHATE SERPL-MCNC: 2.6 MG/DL (ref 2.7–4.5)
PLATELET # BLD AUTO: 578 K/UL (ref 150–350)
PMV BLD AUTO: 10.3 FL (ref 9.2–12.9)
POCT GLUCOSE: 92 MG/DL (ref 70–110)
POTASSIUM SERPL-SCNC: 3.9 MMOL/L (ref 3.5–5.1)
PROCALCITONIN SERPL IA-MCNC: 0.1 NG/ML
PROT SERPL-MCNC: 6.5 G/DL (ref 6–8.4)
RBC # BLD AUTO: 2.79 M/UL (ref 4.6–6.2)
SODIUM SERPL-SCNC: 137 MMOL/L (ref 136–145)
WBC # BLD AUTO: 7.79 K/UL (ref 3.9–12.7)

## 2020-09-06 PROCEDURE — 94640 AIRWAY INHALATION TREATMENT: CPT

## 2020-09-06 PROCEDURE — 84145 PROCALCITONIN (PCT): CPT

## 2020-09-06 PROCEDURE — 11000001 HC ACUTE MED/SURG PRIVATE ROOM

## 2020-09-06 PROCEDURE — 63600175 PHARM REV CODE 636 W HCPCS: Performed by: HOSPITALIST

## 2020-09-06 PROCEDURE — 25000242 PHARM REV CODE 250 ALT 637 W/ HCPCS: Performed by: HOSPITALIST

## 2020-09-06 PROCEDURE — 80053 COMPREHEN METABOLIC PANEL: CPT

## 2020-09-06 PROCEDURE — 25000003 PHARM REV CODE 250: Performed by: STUDENT IN AN ORGANIZED HEALTH CARE EDUCATION/TRAINING PROGRAM

## 2020-09-06 PROCEDURE — 94761 N-INVAS EAR/PLS OXIMETRY MLT: CPT

## 2020-09-06 PROCEDURE — 99233 SBSQ HOSP IP/OBS HIGH 50: CPT | Mod: ,,, | Performed by: HOSPITALIST

## 2020-09-06 PROCEDURE — 99900035 HC TECH TIME PER 15 MIN (STAT)

## 2020-09-06 PROCEDURE — 25000003 PHARM REV CODE 250: Performed by: PHYSICIAN ASSISTANT

## 2020-09-06 PROCEDURE — 86140 C-REACTIVE PROTEIN: CPT

## 2020-09-06 PROCEDURE — 63600175 PHARM REV CODE 636 W HCPCS: Performed by: STUDENT IN AN ORGANIZED HEALTH CARE EDUCATION/TRAINING PROGRAM

## 2020-09-06 PROCEDURE — 94668 MNPJ CHEST WALL SBSQ: CPT

## 2020-09-06 PROCEDURE — 83605 ASSAY OF LACTIC ACID: CPT

## 2020-09-06 PROCEDURE — 25000242 PHARM REV CODE 250 ALT 637 W/ HCPCS: Performed by: PHYSICIAN ASSISTANT

## 2020-09-06 PROCEDURE — 84100 ASSAY OF PHOSPHORUS: CPT

## 2020-09-06 PROCEDURE — 25000003 PHARM REV CODE 250: Performed by: HOSPITALIST

## 2020-09-06 PROCEDURE — 99233 PR SUBSEQUENT HOSPITAL CARE,LEVL III: ICD-10-PCS | Mod: ,,, | Performed by: HOSPITALIST

## 2020-09-06 PROCEDURE — 87070 CULTURE OTHR SPECIMN AEROBIC: CPT

## 2020-09-06 PROCEDURE — 87205 SMEAR GRAM STAIN: CPT

## 2020-09-06 PROCEDURE — 85025 COMPLETE CBC W/AUTO DIFF WBC: CPT

## 2020-09-06 PROCEDURE — 36415 COLL VENOUS BLD VENIPUNCTURE: CPT

## 2020-09-06 PROCEDURE — 83735 ASSAY OF MAGNESIUM: CPT

## 2020-09-06 PROCEDURE — 87077 CULTURE AEROBIC IDENTIFY: CPT

## 2020-09-06 PROCEDURE — 87186 SC STD MICRODIL/AGAR DIL: CPT

## 2020-09-06 RX ORDER — ALBUTEROL SULFATE 2.5 MG/.5ML
2.5 SOLUTION RESPIRATORY (INHALATION) EVERY 4 HOURS PRN
Status: DISCONTINUED | OUTPATIENT
Start: 2020-09-06 | End: 2020-09-06

## 2020-09-06 RX ORDER — SODIUM,POTASSIUM PHOSPHATES 280-250MG
2 POWDER IN PACKET (EA) ORAL
Status: DISCONTINUED | OUTPATIENT
Start: 2020-09-06 | End: 2020-09-10

## 2020-09-06 RX ORDER — MAGNESIUM SULFATE HEPTAHYDRATE 40 MG/ML
2 INJECTION, SOLUTION INTRAVENOUS ONCE
Status: COMPLETED | OUTPATIENT
Start: 2020-09-06 | End: 2020-09-06

## 2020-09-06 RX ORDER — LOPERAMIDE HYDROCHLORIDE 2 MG/1
2 CAPSULE ORAL 2 TIMES DAILY PRN
Status: DISCONTINUED | OUTPATIENT
Start: 2020-09-06 | End: 2020-09-11

## 2020-09-06 RX ORDER — ALBUTEROL SULFATE 2.5 MG/.5ML
2.5 SOLUTION RESPIRATORY (INHALATION) EVERY 4 HOURS PRN
Status: DISCONTINUED | OUTPATIENT
Start: 2020-09-06 | End: 2020-09-18 | Stop reason: HOSPADM

## 2020-09-06 RX ADMIN — ACETAMINOPHEN 650 MG: 325 TABLET ORAL at 09:09

## 2020-09-06 RX ADMIN — DIPHENHYDRAMINE HYDROCHLORIDE 12.5 MG: 25 SOLUTION ORAL at 09:09

## 2020-09-06 RX ADMIN — PIPERACILLIN SODIUM AND TAZOBACTAM SODIUM 4.5 G: 4; .5 INJECTION, POWDER, LYOPHILIZED, FOR SOLUTION INTRAVENOUS at 09:09

## 2020-09-06 RX ADMIN — BACLOFEN 10 MG: 10 TABLET ORAL at 03:09

## 2020-09-06 RX ADMIN — GABAPENTIN 250 MG: 250 SOLUTION ORAL at 09:09

## 2020-09-06 RX ADMIN — Medication 1 CAPSULE: at 08:09

## 2020-09-06 RX ADMIN — HEPARIN SODIUM 5000 UNITS: 5000 INJECTION INTRAVENOUS; SUBCUTANEOUS at 09:09

## 2020-09-06 RX ADMIN — POTASSIUM & SODIUM PHOSPHATES POWDER PACK 280-160-250 MG 2 PACKET: 280-160-250 PACK at 04:09

## 2020-09-06 RX ADMIN — LOPERAMIDE HYDROCHLORIDE 2 MG: 2 CAPSULE ORAL at 08:09

## 2020-09-06 RX ADMIN — POTASSIUM & SODIUM PHOSPHATES POWDER PACK 280-160-250 MG 1 PACKET: 280-160-250 PACK at 06:09

## 2020-09-06 RX ADMIN — POTASSIUM & SODIUM PHOSPHATES POWDER PACK 280-160-250 MG 2 PACKET: 280-160-250 PACK at 09:09

## 2020-09-06 RX ADMIN — PIPERACILLIN SODIUM AND TAZOBACTAM SODIUM 4.5 G: 4; .5 INJECTION, POWDER, LYOPHILIZED, FOR SOLUTION INTRAVENOUS at 12:09

## 2020-09-06 RX ADMIN — BACLOFEN 10 MG: 10 TABLET ORAL at 09:09

## 2020-09-06 RX ADMIN — ALBUTEROL SULFATE 2.5 MG: 2.5 SOLUTION RESPIRATORY (INHALATION) at 11:09

## 2020-09-06 RX ADMIN — PROPRANOLOL HYDROCHLORIDE 20 MG: 20 TABLET ORAL at 09:09

## 2020-09-06 RX ADMIN — ALBUTEROL SULFATE 2.5 MG: 2.5 SOLUTION RESPIRATORY (INHALATION) at 03:09

## 2020-09-06 RX ADMIN — PIPERACILLIN SODIUM AND TAZOBACTAM SODIUM 4.5 G: 4; .5 INJECTION, POWDER, LYOPHILIZED, FOR SOLUTION INTRAVENOUS at 03:09

## 2020-09-06 RX ADMIN — POTASSIUM & SODIUM PHOSPHATES POWDER PACK 280-160-250 MG 2 PACKET: 280-160-250 PACK at 11:09

## 2020-09-06 RX ADMIN — PROPRANOLOL HYDROCHLORIDE 20 MG: 20 TABLET ORAL at 03:09

## 2020-09-06 RX ADMIN — ACETAMINOPHEN 650 MG: 325 TABLET ORAL at 04:09

## 2020-09-06 RX ADMIN — MAGNESIUM SULFATE IN WATER 2 G: 40 INJECTION, SOLUTION INTRAVENOUS at 09:09

## 2020-09-06 RX ADMIN — ALBUTEROL SULFATE 2.5 MG: 2.5 SOLUTION RESPIRATORY (INHALATION) at 09:09

## 2020-09-06 RX ADMIN — PHENOBARBITAL 100 MG: 20 ELIXIR ORAL at 09:09

## 2020-09-06 NOTE — CARE UPDATE
Rapid Response Nurse Chart Check     Chart check completed, temp 100.9,  noted. bedside RNWilber contacted. No concerns verbalized at this time. PRN tylenol given. Instructed to call [phone] for further concerns or assistance.

## 2020-09-06 NOTE — PLAN OF CARE
Chart reviewed:  Tmax 100.9 x2 Tcurrent 98.9  WBC WNL:  CT ABD/CTA:  - Marked abnormal wall thickening and inflammatory change involving the rectosigmoid colon concerning for underlying colitis of infectious, inflammatory or ischemic etiologies.  Large volume of liquid stool throughout the colon which can be seen with diarrheal illness.  Abnormal complex appearing fluid within the pelvis possibly reactive due to aforementioned inflammatory change of the bowel.   - The visualized osseous structures appear stable with chronic appearing deformity of the bilateral femora and acetabula.  Skeletal structures otherwise intact without evidence of erosive change.     Assessment/Plan:  1. On Zosyn for serratia PNA with plan to complete 7d - O2 Sat god on RA  2. CT findings with abd fluid but stable -  CT abd 8/11 showed similar findings  CT shows inflammatory bowel changes - may be cause of fever - on zosyn which should cover for enteric organisms  - rec GI/Colon rectal consult  3. Will follow from [place] for now    Lance Granados PA-C [facility]

## 2020-09-06 NOTE — PROGRESS NOTES
Progress Note   Hospital Medicine         Patient Name: Glen Moscoso  MRN:  [id number]  Hospital Medicine Team: [facility] HOSP MED K Quiana Armenta MD  Date of Admission:  8/26/2020     Length of Stay:  LOS: 11 days   Expected Discharge Date: 9/9/2020  Principal Problem:  Acute respiratory failure with hypoxia       Subjective:     Interval History/Overnight Events:  Fever to 101 x2 overnight. When fevers, has tachycardia similar to previous fever curves earlier in week, improved after fever resolved. Mom at bedside confirms also. resp cx reordered, rare GNr so far. CRp higher than previous at 98 but procal better, lactate okay. Only 1 BM yesterday and much improved from previous per nurse today.   Unclear etiology of rectocolitis imaging finding if imaging lagging as exam in person improving and zosyn would be covering this. discsused with gen surg yesterady on phone, reviewed wound care images from 9/4 and were going to stop by room to review wounds, mom doesn't recall them coming but says ID did drop by briefly, no notes from other services yesterday. gen surg reports on phoen to me that the sites do not seem like those that would be ones they would debride, as the darkened area is covering completely and barrier now even though there is not intact skin, the skin is starting some healing per discussion on phone. Area of unclear signicant complex in pelvis seen on CT also, present on previous, hector see ID thoughts, if concerns for an infection nidus, may need to discuss if we need IR to consider drain/needle to assess.  Repeat resp cx to ensure no new infection here causing reoccurent fever  Mom updated on these recs. On board with plan          Review of Systems   Unable to assess secondary to baseline cognitive status.  All other systems reviewed and are negative.    Objective:     Temp:  [98 °F (36.7 °C)-100.9 °F (38.3 °C)]   Pulse:  []   Resp:  [18-20]   BP: (110-130)/(56-76)   SpO2:  [94 %-100 %]       Physical  Exam:  Constitutional: Appears underweight, chronically ill baseline. Nonverbal. Contractures.  Head: Normocephalic and atraumatic.   Mouth/Throat: Oropharynx is clear and moist.   Eyes: EOM are normal. Pupils are equal, round, and reactive to light. No scleral icterus.   Neck: Normal range of motion. Neck supple. some contracture in neck.  Cardiovascular: Normal rate and regular rhythm.  No murmur heard.  Pulmonary/Chest: Effort normal and breath sounds normal. Coarse breath sounds. On room air.   Abdominal: Soft. Bowel sounds are normal.  No distension or tenderness. G tube.  Musculoskeletal: muscle wasting. Contractures of both legs. No edema.  Neurological: not alert to place or person, baseline status. No gross abnormalities on minimal neuro exam- moving all 4 extremities. Cannot assess cranial nerves or sensation. bedbound baseline.  Skin: Skin is warm and dry. sacral ulcerations with full thickness breakdown seen with black nonviable tissue (See wound care pictures). Keloid type changes on bilatearl hips. Peripheral IV in left arm.  Psychiatric: Normal mood and affect. Behavior is normal.     Recent Labs   Lab 09/01/20  0045 09/02/20  0252 09/03/20  0809 09/04/20  0508 09/05/20  0413 09/06/20  0550   WBC 9.52 9.08 9.85 9.51 7.66 7.79   HGB 8.9* 7.8* 7.8* 7.2* 7.5* 8.2*   HCT 29.5* 25.4* 25.7* 22.7* 24.7* 27.4*   * 396* 408* 467* 569* 578*     Recent Labs   Lab 09/04/20  0508 09/05/20  0413 09/06/20  0550    141 137   K 3.9 3.9 3.9    107 105   CO2 24 27 25   BUN 7 6 6   CREATININE 0.5 0.5 0.5    109 85   CALCIUM 8.0* 8.3* 8.2*   MG 1.6 1.7 1.6   PHOS 2.8 3.9 2.6*     Recent Labs   Lab 09/01/20  0045  09/04/20  0508 09/05/20  0413 09/06/20  0550   ALKPHOS 119   < > 126 166* 163*   ALT 18   < > 20 23 21   AST 21   < > 25 27 20   ALBUMIN 1.8*   < > 1.5* 1.6* 1.7*   PROT 6.1   < > 5.8* 6.3 6.5   BILITOT 0.1   < > <0.1* 0.1 0.2   INR 1.1  --   --   --   --     < > = values in this  interval not displayed.     Recent Labs   Lab 08/31/20  0803 08/31/20  1233 08/31/20  1829 08/31/20  2049 09/04/20  1838 09/05/20  1646   POCTGLUCOSE 81 127* 107 181* 81 104        baclofen  10 mg Per G Tube TID    diphenhydrAMINE  12.5 mg Per G Tube QHS    gabapentin  250 mg Per G Tube QHS    heparin (porcine)  5,000 Units Subcutaneous Q12H    Lactobacillus rhamnosus GG  1 capsule Per G Tube Daily    PHENobarbitaL  100 mg Per G Tube QHS    piperacillin-tazobactam (ZOSYN) IVPB  4.5 g Intravenous Q8H    potassium, sodium phosphates  2 packet Per G Tube QID (AC & HS)    propranoloL  20 mg Per G Tube TID       Assessment and Plan     Mr. Glen Moscoso is a 24 y.o. male who presented to [facility] on 8/26/2020 with     Acute respiratory failure with hypoxia  Serratia/Pseudomonas Pneumonia--> with reoccurrent Resistant pneumonia     · Patient intubated upon transfer from outside facility and extubated 8/27  ·  Patient found to have pneumonia and felt cause of respiratory failure, treated with course of cipro, stepped back up to ICU 8/31 for worse resp fx and stridor, improved with chin thrust manuevers but still required extra stay, requires frequent suctioning for this  · Sputum Cx growing serratia again on 8/31 while on treatment with cipro, still febrile every day to 101 multiple times, sputum less per mom and CXR improved but still has episodes likely aspiration with increased HR, and fever, on RA with sats 92-98% now. Sensitivities returned on 9/3 with Cipro resistant now, zosyn started, ID following, recommend to continue, chest CT with R GOO likely the pna, no clots. Has been on RA since SD, afebrile since zosyn. [place] 1 week zosyn per ID  · Recheck sputum 9/6 for fever, rare GNR so far               Biphasic stridor  · Patient with acute development of stridor after extubation of 8/27 but acutely worsened on 8/30.   · Likely mechanical due to neck muscle weakness related to his advance cerebral palsy as  improved when patient was sat up and chin was pulled up) and recent acute illnesses but also likely a degree of airway inflammation as contributing to upper air obstruction.   · Patient should be at 90 degree angle at all times to help with breathing mechanics and will monitor.  · Stepped back to ICU 8/31 for this, requires frequent suctioning, jaw thrust improves this, mom reports improving now on step down 9/3, continue nebs and supportive care  ·      Pneumonia due to Pseudomonas aerginuosa and Serratia marcesens  · Patient admitted with acute hypoxic respiratory failure and sepsis. Blood and urine cultures negative and C. Diff negative but respiratory culture grew Serratia and Pseudomonas. Procal improved since initiation of antibiotics and WBC normalized and sepsis resolved (procal 4--. 3)  -see above for acute resp failure, ID consulted 9/3 for 8/31 serratia with FQ resistance now, Zosyn started. Continue now.   -1 week at least per ID (9/10)        Encephalopathy acute  · Improved, at baseline on step down 9/3 per mom, non verbal at baseline but interacts with his mom somewhat  · Patient with decreased mentation from baseline on admit. Patient transferred to [facility] ICU for further evaluation. EEG done and showed no status epilepticus and encephalopathy felt related to sepsis/infection.     Decubitus ulcer of left buttock, stage 2  Deep tissue injury  Wounds present on admit an wound care- mom reports had very bad stool issues a month ago causing these worsening wounds prior to admit  -wound care recs triad cream to bilatearl ischium, scrotum, r wrist pain daily with betadine  -nonviable tissue to sacrum (See pictures)  -gen surg consultd 9/3 for debridmenet, consulted, feel tissue does not need debridement now on exam but likely were looking at area that wound care was not concerned about as wound care called 9/4 and stated this, will talk to them again after CT results below.  -CT abdomen/pelvis not  showing bone involved, discussed with gen surgkatharina on phone 9/5, reviewed pictures, does not feel needs debridement now  Continue to Turn every 2 hrs. Pad bony prominences with pillows or foam dressings.  -       Seizure disorder  Chronic and controlled. EEG done on this admit showed no status epilepticus.   -Continue Phenobarbital 100 mg nightly via G tube to treat as patient takes at home         Anemia of chronic disease  Chronic and controlled. Monitor with daily CBC and transfuse if Hgb < 7.   -Hg between 7-8 now but has has had slow downtick to lower 7s now. Close to transfusion threshold on 9/4, may require tomorrow. No signs of active bleeding.  -stable high 7's low 8's        Hypokalemia  Hypophosphatemia  -replace PRN          Hypernatremia  · Patient developed hypernatremia in ICU related to normal saline infusion and free water depletion. Normal saline infusion stopped on am of 8/29 when sodium got up to 150.  · Had issuse with lower Na with free water flushes so stopped in ICU on step up 8/31, currently normal Na on TF without extra free water   · -no issues on SD again currently             Severe protein-calorie malnutrition  · Patient with obvious muscle wasting and BMI of 15.8. Nutrition consulted on admit.   · Patient started on TF with Peptamen 1.5 with prebio at 40 cc/hr.         Cerebral palsy  Patient with poor functional baseline and bed bound with extremity contractures and total care at home.    Line associated UE clot  -line removed after Left basilic thrombus found in fever work up. RIght cephalic not well visualized, cannot rule out  -as line associated, 3 months anticoag recommmended usually. On heparin now, likely can convert to NOAC     Diarrhea  Protocolitis  -C dff negative  -will add immodium to see if improves this  -tube feeds could be contributor but have changed since home and diarrhea has persisted since then  -seen in CT early august and again 9/4. Diarrhea improved, 2 Bm  overnight. May be lagging imaging? Will see if ID has any concerns about this. On zosyn for pna so in theory would also cover any colitis concerns  9/6: only 1 BM in last 24 hours per nusring, much improved, will make imodium PRN as dont want to cause constipation now     Sleep Disturbance  -resumed home bendadryl and neurontin qhs          Diet:  Tube feeds- peptamen   DVT PPx:  TEDS/SCDS             Disposition:  Further ID recs regarding CT findings  Fever again, resp cx reordered  If keeps reoccuring, may have to discuss IR consideration of fluid collection assessment in pelvis    Ultimate plan is HH with mom with DME and close follow ups  Mom # if not in room for MD exams she is in lobby, stays 24/7, call her cell at [phone] and she will return immediatley    Discharge Planning   ROCÍO: 9/9/2020     Code Status: Full Code   Is the patient medically ready for discharge?: No    Reason for patient still in hospital (select all that apply): Patient unstable  Discharge Plan A: Home with family, Home Health   Discharge Delays: None known at this time

## 2020-09-06 NOTE — CARE UPDATE
Rapid Response Nurse Chart Check     Chart check completed, abnormal VS noted. Please call [phone] for further concerns or assistance.

## 2020-09-07 LAB
ALBUMIN SERPL BCP-MCNC: 1.7 G/DL (ref 3.5–5.2)
ALP SERPL-CCNC: 145 U/L (ref 55–135)
ALT SERPL W/O P-5'-P-CCNC: 19 U/L (ref 10–44)
ANION GAP SERPL CALC-SCNC: 8 MMOL/L (ref 8–16)
AST SERPL-CCNC: 23 U/L (ref 10–40)
BASOPHILS # BLD AUTO: 0.03 K/UL (ref 0–0.2)
BASOPHILS NFR BLD: 0.4 % (ref 0–1.9)
BILIRUB SERPL-MCNC: 0.2 MG/DL (ref 0.1–1)
BUN SERPL-MCNC: 7 MG/DL (ref 6–20)
CALCIUM SERPL-MCNC: 8.1 MG/DL (ref 8.7–10.5)
CHLORIDE SERPL-SCNC: 104 MMOL/L (ref 95–110)
CO2 SERPL-SCNC: 24 MMOL/L (ref 23–29)
CREAT SERPL-MCNC: 0.5 MG/DL (ref 0.5–1.4)
DIFFERENTIAL METHOD: ABNORMAL
EOSINOPHIL # BLD AUTO: 0 K/UL (ref 0–0.5)
EOSINOPHIL NFR BLD: 0 % (ref 0–8)
ERYTHROCYTE [DISTWIDTH] IN BLOOD BY AUTOMATED COUNT: 17.3 % (ref 11.5–14.5)
EST. GFR  (AFRICAN AMERICAN): >60 ML/MIN/1.73 M^2
EST. GFR  (NON AFRICAN AMERICAN): >60 ML/MIN/1.73 M^2
GLUCOSE SERPL-MCNC: 89 MG/DL (ref 70–110)
HCT VFR BLD AUTO: 25.9 % (ref 40–54)
HGB BLD-MCNC: 7.8 G/DL (ref 14–18)
IMM GRANULOCYTES # BLD AUTO: 0.02 K/UL (ref 0–0.04)
IMM GRANULOCYTES NFR BLD AUTO: 0.3 % (ref 0–0.5)
LIPASE SERPL-CCNC: 12 U/L (ref 4–60)
LYMPHOCYTES # BLD AUTO: 1.2 K/UL (ref 1–4.8)
LYMPHOCYTES NFR BLD: 15.7 % (ref 18–48)
MAGNESIUM SERPL-MCNC: 1.9 MG/DL (ref 1.6–2.6)
MCH RBC QN AUTO: 29.4 PG (ref 27–31)
MCHC RBC AUTO-ENTMCNC: 30.1 G/DL (ref 32–36)
MCV RBC AUTO: 98 FL (ref 82–98)
MONOCYTES # BLD AUTO: 0.8 K/UL (ref 0.3–1)
MONOCYTES NFR BLD: 11.1 % (ref 4–15)
NEUTROPHILS # BLD AUTO: 5.4 K/UL (ref 1.8–7.7)
NEUTROPHILS NFR BLD: 72.5 % (ref 38–73)
NRBC BLD-RTO: 0 /100 WBC
PHOSPHATE SERPL-MCNC: 3.6 MG/DL (ref 2.7–4.5)
PLATELET # BLD AUTO: 636 K/UL (ref 150–350)
PMV BLD AUTO: 9.5 FL (ref 9.2–12.9)
POTASSIUM SERPL-SCNC: 4.1 MMOL/L (ref 3.5–5.1)
PROT SERPL-MCNC: 6.4 G/DL (ref 6–8.4)
RBC # BLD AUTO: 2.65 M/UL (ref 4.6–6.2)
SODIUM SERPL-SCNC: 136 MMOL/L (ref 136–145)
WBC # BLD AUTO: 7.47 K/UL (ref 3.9–12.7)

## 2020-09-07 PROCEDURE — 99233 PR SUBSEQUENT HOSPITAL CARE,LEVL III: ICD-10-PCS | Mod: ,,, | Performed by: HOSPITALIST

## 2020-09-07 PROCEDURE — 25000003 PHARM REV CODE 250: Performed by: STUDENT IN AN ORGANIZED HEALTH CARE EDUCATION/TRAINING PROGRAM

## 2020-09-07 PROCEDURE — 99233 SBSQ HOSP IP/OBS HIGH 50: CPT | Mod: ,,, | Performed by: HOSPITALIST

## 2020-09-07 PROCEDURE — 36415 COLL VENOUS BLD VENIPUNCTURE: CPT

## 2020-09-07 PROCEDURE — 63600175 PHARM REV CODE 636 W HCPCS: Performed by: HOSPITALIST

## 2020-09-07 PROCEDURE — 63600175 PHARM REV CODE 636 W HCPCS: Performed by: STUDENT IN AN ORGANIZED HEALTH CARE EDUCATION/TRAINING PROGRAM

## 2020-09-07 PROCEDURE — 25000003 PHARM REV CODE 250: Performed by: PHYSICIAN ASSISTANT

## 2020-09-07 PROCEDURE — 99233 SBSQ HOSP IP/OBS HIGH 50: CPT | Mod: ,,, | Performed by: INTERNAL MEDICINE

## 2020-09-07 PROCEDURE — 99900035 HC TECH TIME PER 15 MIN (STAT)

## 2020-09-07 PROCEDURE — 11000001 HC ACUTE MED/SURG PRIVATE ROOM

## 2020-09-07 PROCEDURE — 25000003 PHARM REV CODE 250: Performed by: HOSPITALIST

## 2020-09-07 PROCEDURE — 94761 N-INVAS EAR/PLS OXIMETRY MLT: CPT

## 2020-09-07 PROCEDURE — 83690 ASSAY OF LIPASE: CPT

## 2020-09-07 PROCEDURE — 89055 LEUKOCYTE ASSESSMENT FECAL: CPT

## 2020-09-07 PROCEDURE — 87209 SMEAR COMPLEX STAIN: CPT

## 2020-09-07 PROCEDURE — 27000221 HC OXYGEN, UP TO 24 HOURS

## 2020-09-07 PROCEDURE — 99233 PR SUBSEQUENT HOSPITAL CARE,LEVL III: ICD-10-PCS | Mod: ,,, | Performed by: INTERNAL MEDICINE

## 2020-09-07 PROCEDURE — 82272 OCCULT BLD FECES 1-3 TESTS: CPT

## 2020-09-07 PROCEDURE — 84100 ASSAY OF PHOSPHORUS: CPT

## 2020-09-07 PROCEDURE — 80053 COMPREHEN METABOLIC PANEL: CPT

## 2020-09-07 PROCEDURE — 94668 MNPJ CHEST WALL SBSQ: CPT

## 2020-09-07 PROCEDURE — 99900026 HC AIRWAY MAINTENANCE (STAT)

## 2020-09-07 PROCEDURE — 83735 ASSAY OF MAGNESIUM: CPT

## 2020-09-07 PROCEDURE — 85025 COMPLETE CBC W/AUTO DIFF WBC: CPT

## 2020-09-07 RX ADMIN — DIPHENHYDRAMINE HYDROCHLORIDE 12.5 MG: 25 SOLUTION ORAL at 09:09

## 2020-09-07 RX ADMIN — HEPARIN SODIUM 5000 UNITS: 5000 INJECTION INTRAVENOUS; SUBCUTANEOUS at 10:09

## 2020-09-07 RX ADMIN — ACETAMINOPHEN 650 MG: 325 TABLET ORAL at 09:09

## 2020-09-07 RX ADMIN — LOPERAMIDE HYDROCHLORIDE 2 MG: 2 CAPSULE ORAL at 05:09

## 2020-09-07 RX ADMIN — PHENOBARBITAL 100 MG: 20 ELIXIR ORAL at 09:09

## 2020-09-07 RX ADMIN — PROPRANOLOL HYDROCHLORIDE 20 MG: 20 TABLET ORAL at 10:09

## 2020-09-07 RX ADMIN — Medication 1 CAPSULE: at 08:09

## 2020-09-07 RX ADMIN — BACLOFEN 10 MG: 10 TABLET ORAL at 08:09

## 2020-09-07 RX ADMIN — PROPRANOLOL HYDROCHLORIDE 20 MG: 20 TABLET ORAL at 05:09

## 2020-09-07 RX ADMIN — BACLOFEN 10 MG: 10 TABLET ORAL at 05:09

## 2020-09-07 RX ADMIN — GABAPENTIN 250 MG: 250 SOLUTION ORAL at 09:09

## 2020-09-07 RX ADMIN — POTASSIUM & SODIUM PHOSPHATES POWDER PACK 280-160-250 MG 2 PACKET: 280-160-250 PACK at 05:09

## 2020-09-07 RX ADMIN — BACLOFEN 10 MG: 10 TABLET ORAL at 10:09

## 2020-09-07 RX ADMIN — PIPERACILLIN SODIUM AND TAZOBACTAM SODIUM 4.5 G: 4; .5 INJECTION, POWDER, LYOPHILIZED, FOR SOLUTION INTRAVENOUS at 01:09

## 2020-09-07 RX ADMIN — POTASSIUM & SODIUM PHOSPHATES POWDER PACK 280-160-250 MG 2 PACKET: 280-160-250 PACK at 09:09

## 2020-09-07 RX ADMIN — PIPERACILLIN SODIUM AND TAZOBACTAM SODIUM 4.5 G: 4; .5 INJECTION, POWDER, LYOPHILIZED, FOR SOLUTION INTRAVENOUS at 08:09

## 2020-09-07 RX ADMIN — PIPERACILLIN SODIUM AND TAZOBACTAM SODIUM 4.5 G: 4; .5 INJECTION, POWDER, LYOPHILIZED, FOR SOLUTION INTRAVENOUS at 05:09

## 2020-09-07 RX ADMIN — POTASSIUM & SODIUM PHOSPHATES POWDER PACK 280-160-250 MG 2 PACKET: 280-160-250 PACK at 12:09

## 2020-09-07 RX ADMIN — HEPARIN SODIUM 5000 UNITS: 5000 INJECTION INTRAVENOUS; SUBCUTANEOUS at 08:09

## 2020-09-07 RX ADMIN — PROPRANOLOL HYDROCHLORIDE 20 MG: 20 TABLET ORAL at 08:09

## 2020-09-07 NOTE — CONSULTS
[facility]  Gastroenterology  Consult Note    Patient Name: Glen Moscoso  MRN: [id number]  Admission Date: 8/26/2020  Hospital Length of Stay: 12 days  Code Status: Full Code   Attending Provider: Quiana Armenta MD   Consulting Provider: Junior Sloan MD  Primary Care Physician: Yadi Lawson MD  Principal Problem:Acute respiratory failure with hypoxia    Inpatient consult to Gastroenterology  Consult performed by: Junior Sloan MD  Consult ordered by: Quiana Armenta MD        Subjective:     HPI:  24 male PMH cerebral palsy, seizures, encephalopathy, peg tube placement, initially admitted with respiratory distress requiring intubation, respiratory cultures notable for Pseudomonas and Serratia.  Initially started on Zosyn which he continues, Infectious Disease following.  GI consulted for one-month history of diarrhea, as well as rectosigmoid thickening seen on CT scan.    Patient has significant disability, nonverbal, history obtained through chart review and mother.  She notes over the past 1-2 months patient has been in and out of the hospital and on multiple courses of antibiotics.  Prior to hospital stays, patient was on significant bowel regimen for constipation, but after initiation of antibiotics, developed diarrhea.  C diff has been ruled out.  Upon transfer here, repeat imaging of abdomen showed markedly abnormal thickening and inflammatory change involving the rectosigmoid colon.  Mother notes he has never had diarrhea similar to this before.  She also endorses small amounts of bright red blood was seen in a few bowel movements prior to presentation, mixed with stool.    No reported fevers, abdominal pain    Past Medical History:   Diagnosis Date    Acid reflux     Cerebral palsy     History of hip surgery     S/P percutaneous endoscopic gastrostomy (PEG) tube placement     Seizures        History reviewed. No pertinent surgical history.    Review of patient's  allergies indicates:  No Known Allergies  Family History     None        Tobacco Use    Smoking status: Never Smoker    Smokeless tobacco: Never Used   Substance and Sexual Activity    Alcohol use: Never     Frequency: Never    Drug use: Not Currently    Sexual activity: Not on file     Review of Systems   Unable to perform ROS: Patient nonverbal     Objective:     Vital Signs (Most Recent):  Temp: 98.9 °F (37.2 °C) (09/07/20 0526)  Pulse: 102 (09/07/20 0526)  Resp: 18 (09/07/20 0526)  BP: (!) 117/59 (09/07/20 0526)  SpO2: 95 % (09/07/20 0526) Vital Signs (24h Range):  Temp:  [98 °F (36.7 °C)-100.9 °F (38.3 °C)] 98.9 °F (37.2 °C)  Pulse:  [] 102  Resp:  [16-18] 18  SpO2:  [92 %-100 %] 95 %  BP: (105-121)/(55-75) 117/59     Weight: 33.1 kg (73 lb) (08/26/20 1005)  Body mass index is 15.8 kg/m².      Intake/Output Summary (Last 24 hours) at 9/7/2020 1034  Last data filed at 9/6/2020 2100  Gross per 24 hour   Intake 840 ml   Output 0 ml   Net 840 ml       Lines/Drains/Airways     Drain                 Gastrostomy/Enterostomy 08/04/20 1653 Percutaneous endoscopic gastrostomy (PEG) feeding 33 days          Peripheral Intravenous Line                 Peripheral IV - Single Lumen 09/01/20 1449 22 G Left Antecubital 5 days         Peripheral IV - Single Lumen 09/02/20 1200 20 G;1 3/4 in Right Forearm 4 days                Physical Exam    Gen:  Contracted, lying in bed  HENT: NCAT, dry oropharynx  Eyes: anicteric sclerae, EOMI grossly  Neck:  Contorted, fixed  Cardiac: RRR, no M/R/G, S1/S2 present  Lungs: CTAB, no crackles, no wheezes  Abd: soft, NT/ND, normoactive BS  Ext: no LE edema, warm, well perfused, contracted  Skin: skin intact on exposed body parts, no visible rashes, lesions  Neuro:  Nonverbal, minimal movement on exam      Significant Labs:  CBC:   Recent Labs   Lab 09/06/20  0550 09/07/20  0713   WBC 7.79 7.47   HGB 8.2* 7.8*   HCT 27.4* 25.9*   * 636*     CMP:   Recent Labs   Lab  09/07/20  0713   GLU 89   CALCIUM 8.1*   ALBUMIN 1.7*   PROT 6.4      K 4.1   CO2 24      BUN 7   CREATININE 0.5   ALKPHOS 145*   ALT 19   AST 23   BILITOT 0.2     Coagulation: No results for input(s): PT, INR, APTT in the last 48 hours.  CRP:   Recent Labs   Lab 09/06/20  0746   CRP 98.5*     ESR: No results for input(s): SEDRATE in the last 48 hours.    Significant Imaging:  CT: I have reviewed all results within the past 24 hours and my personal findings are:  As per HPI rectosigmoid thickening  Imaging results within the past 24 hours have been reviewed.    Assessment/Plan:     Colitis  CT A/P concerning for thickening of the recto-sigmoid concerning for infectious/inflammatory colitis.  Patient with correlating diarrhea x1 month    Recommendations:  -we will follow up stool culture to rule out infectious diarrhea  -tentatively we will plan for flex sig in next 1-2 days pending culture  -NPO at midnight in event of flex sig tomorrow          Thank you for your consult. I will follow-up with patient. Please contact us if you have any additional questions.    Junior Sloan MD  Gastroenterology  [facility]

## 2020-09-07 NOTE — SUBJECTIVE & OBJECTIVE
Past Medical History:   Diagnosis Date    Acid reflux     Cerebral palsy     History of hip surgery     S/P percutaneous endoscopic gastrostomy (PEG) tube placement     Seizures        History reviewed. No pertinent surgical history.    Review of patient's allergies indicates:  No Known Allergies  Family History     None        Tobacco Use    Smoking status: Never Smoker    Smokeless tobacco: Never Used   Substance and Sexual Activity    Alcohol use: Never     Frequency: Never    Drug use: Not Currently    Sexual activity: Not on file     Review of Systems   Unable to perform ROS: Patient nonverbal     Objective:     Vital Signs (Most Recent):  Temp: 98.9 °F (37.2 °C) (09/07/20 0526)  Pulse: 102 (09/07/20 0526)  Resp: 18 (09/07/20 0526)  BP: (!) 117/59 (09/07/20 0526)  SpO2: 95 % (09/07/20 0526) Vital Signs (24h Range):  Temp:  [98 °F (36.7 °C)-100.9 °F (38.3 °C)] 98.9 °F (37.2 °C)  Pulse:  [] 102  Resp:  [16-18] 18  SpO2:  [92 %-100 %] 95 %  BP: (105-121)/(55-75) 117/59     Weight: 33.1 kg (73 lb) (08/26/20 1005)  Body mass index is 15.8 kg/m².      Intake/Output Summary (Last 24 hours) at 9/7/2020 1034  Last data filed at 9/6/2020 2100  Gross per 24 hour   Intake 840 ml   Output 0 ml   Net 840 ml       Lines/Drains/Airways     Drain                 Gastrostomy/Enterostomy 08/04/20 1653 Percutaneous endoscopic gastrostomy (PEG) feeding 33 days          Peripheral Intravenous Line                 Peripheral IV - Single Lumen 09/01/20 1449 22 G Left Antecubital 5 days         Peripheral IV - Single Lumen 09/02/20 1200 20 G;1 3/4 in Right Forearm 4 days                Physical Exam    Gen:  Contracted, lying in bed  HENT: NCAT, dry oropharynx  Eyes: anicteric sclerae, EOMI grossly  Neck:  Contorted, fixed  Cardiac: RRR, no M/R/G, S1/S2 present  Lungs: CTAB, no crackles, no wheezes  Abd: soft, NT/ND, normoactive BS  Ext: no LE edema, warm, well perfused, contracted  Skin: skin intact on exposed body  parts, no visible rashes, lesions  Neuro:  Nonverbal, minimal movement on exam      Significant Labs:  CBC:   Recent Labs   Lab 09/06/20  0550 09/07/20  0713   WBC 7.79 7.47   HGB 8.2* 7.8*   HCT 27.4* 25.9*   * 636*     CMP:   Recent Labs   Lab 09/07/20  0713   GLU 89   CALCIUM 8.1*   ALBUMIN 1.7*   PROT 6.4      K 4.1   CO2 24      BUN 7   CREATININE 0.5   ALKPHOS 145*   ALT 19   AST 23   BILITOT 0.2     Coagulation: No results for input(s): PT, INR, APTT in the last 48 hours.  CRP:   Recent Labs   Lab 09/06/20  0746   CRP 98.5*     ESR: No results for input(s): SEDRATE in the last 48 hours.    Significant Imaging:  CT: I have reviewed all results within the past 24 hours and my personal findings are:  As per HPI rectosigmoid thickening  Imaging results within the past 24 hours have been reviewed.

## 2020-09-07 NOTE — PROGRESS NOTES
General Surgery Note:     Bilateral ischial pressure ulcers were re-assessed today. Upon evaluation, patient was sitting in his own stool. Still having frequent loose stools.     Bilateral ulcers stage IV with overlying eschar. They were sharply debrided bedside using #10 scalpel to healthy bleeding tissue. The right ulcer had underlying abscess cavity that tracked inferior and laterally about 2cm. The bases of both ulcers had fibrinous exudate debrided. Both sides packed with wet-to-dry gauze dressings.     Call general surgery for any further needs. Continue local wound care. Recommend BID dressing changes.    Zoe Simms MD  General Surgery Resident, PGY III  Pager [phone]

## 2020-09-07 NOTE — ASSESSMENT & PLAN NOTE
CT A/P concerning for thickening of the recto-sigmoid concerning for infectious/inflammatory colitis.  Patient with correlating diarrhea x1 month    Recommendations:  -we will follow up stool culture to rule out infectious diarrhea  -tentatively we will plan for flex sig in next 1-2 days pending culture  -NPO at midnight in event of flex sig tomorrow

## 2020-09-07 NOTE — NURSING
No status changes throughout night shift. BM x 1 with dark brown loose stool. Mother at bedside all night, however, not much physical participation in care. Patient wounds on buttocks bilaterally not improving. Positioning difficult r/t to underlying physical layout. RN suggests more aggressive wound care management. Fecal management tool used in the past and may be an option if stool doesn't solidify soon.

## 2020-09-07 NOTE — PROGRESS NOTES
Progress Note   Hospital Medicine         Patient Name: Glen Moscoso  MRN:  [id number]  [facility] Quiana Armenta MD  Date of Admission:  8/26/2020     Length of Stay:  LOS: 12 days   Expected Discharge Date: 9/9/2020  Principal Problem:  Acute respiratory failure with hypoxia       Subjective:     Interval History/Overnight Events:    Overnight had 1 time fever again, at least 2-3 BMS loose, better than previous days but still having issues with this at times. Stool CX added. GI consulted per ID recs= recommend f/u CX and planning for flex sig in next 1-2 days, npo mn in case for tomorrow.   gen surg reassessed wounds debrided at bedside and right side with underlying absces cavity traking laterally. Packed with wet to dry drsesings. Recommend BID dressing changes.        Review of Systems   Unable to assess secondary to baseline cognitive status.  All other systems reviewed and are negative.    Objective:     Temp:  [98 °F (36.7 °C)-100.9 °F (38.3 °C)]   Pulse:  []   Resp:  [16-18]   BP: (105-120)/(55-68)   SpO2:  [92 %-99 %]       Physical Exam:  Constitutional: Appears underweight, chronically ill baseline. Nonverbal. Contractures.  Head: Normocephalic and atraumatic.   Mouth/Throat: Oropharynx is clear and moist.   Eyes: EOM are normal. Pupils are equal, round, and reactive to light. No scleral icterus.   Neck: Normal range of motion. Neck supple. some contracture in neck.  Cardiovascular: Normal rate and regular rhythm.  No murmur heard.  Pulmonary/Chest: Effort normal and breath sounds normal. Coarse breath sounds. On room air.   Abdominal: Soft. Bowel sounds are normal.  No distension or tenderness. G tube.  Musculoskeletal: muscle wasting. Contractures of both legs. No edema.  Neurological: not alert to place or person, baseline status. No gross abnormalities on minimal neuro exam- moving all 4 extremities. Cannot assess cranial nerves or sensation. bedbound baseline.  Skin:  Skin is warm and dry. sacral ulcerations with full thickness breakdown seen with black nonviable tissue (See wound care pictures). Keloid type changes on bilatearl hips. Peripheral IV in left arm.  Psychiatric: Normal mood and affect. Behavior is normal.     Recent Labs   Lab 09/02/20  0252 09/03/20  0809 09/04/20  0508 09/05/20  0413 09/06/20  0550 09/07/20  0713   WBC 9.08 9.85 9.51 7.66 7.79 7.47   HGB 7.8* 7.8* 7.2* 7.5* 8.2* 7.8*   HCT 25.4* 25.7* 22.7* 24.7* 27.4* 25.9*   * 408* 467* 569* 578* 636*     Recent Labs   Lab 09/05/20  0413 09/06/20  0550 09/07/20  0713    137 136   K 3.9 3.9 4.1    105 104   CO2 27 25 24   BUN 6 6 7   CREATININE 0.5 0.5 0.5    85 89   CALCIUM 8.3* 8.2* 8.1*   MG 1.7 1.6 1.9   PHOS 3.9 2.6* 3.6     Recent Labs   Lab 09/01/20  0045  09/05/20  0413 09/06/20  0550 09/07/20  0713   ALKPHOS 119   < > 166* 163* 145*   ALT 18   < > 23 21 19   AST 21   < > 27 20 23   ALBUMIN 1.8*   < > 1.6* 1.7* 1.7*   PROT 6.1   < > 6.3 6.5 6.4   BILITOT 0.1   < > 0.1 0.2 0.2   INR 1.1  --   --   --   --     < > = values in this interval not displayed.     Recent Labs   Lab 08/31/20  1829 08/31/20  2049 09/04/20  1838 09/05/20  1646 09/06/20  1640   POCTGLUCOSE 107 181* 81 104 92        baclofen  10 mg Per G Tube TID    diphenhydrAMINE  12.5 mg Per G Tube QHS    gabapentin  250 mg Per G Tube QHS    heparin (porcine)  5,000 Units Subcutaneous Q12H    Lactobacillus rhamnosus GG  1 capsule Per G Tube Daily    PHENobarbitaL  100 mg Per G Tube QHS    piperacillin-tazobactam (ZOSYN) IVPB  4.5 g Intravenous Q8H    potassium, sodium phosphates  2 packet Per G Tube QID (AC & HS)    propranoloL  20 mg Per G Tube TID       Assessment and Plan     Mr. Glen Moscoso is a 24 y.o. male who presented to [facility] on 8/26/2020 with     Acute respiratory failure with hypoxia  Serratia/Pseudomonas Pneumonia--> with reoccurrent Resistant pneumonia     · Patient intubated upon transfer from  outside facility and extubated 8/27  ·  Patient found to have pneumonia and felt cause of respiratory failure, treated with course of cipro, stepped back up to ICU 8/31 for worse resp fx and stridor, improved with chin thrust manuevers but still required extra stay, requires frequent suctioning for this  · Sputum Cx growing serratia again on 8/31 while on treatment with cipro, still febrile every day to 101 multiple times, sputum less per mom and CXR improved but still has episodes likely aspiration with increased HR, and fever, on RA with sats 92-98% now. Sensitivities returned on 9/3 with Cipro resistant now, zosyn started, ID following, recommend to continue, chest CT with R GOO likely the pna, no clots. Has been on RA since SD, afebrile since zosyn. [place] 1 week zosyn per ID  · Recheck sputum 9/6 for fever, rare GNR so far               Biphasic stridor  · Patient with acute development of stridor after extubation of 8/27 but acutely worsened on 8/30.   · Likely mechanical due to neck muscle weakness related to his advance cerebral palsy as improved when patient was sat up and chin was pulled up) and recent acute illnesses but also likely a degree of airway inflammation as contributing to upper air obstruction.   · Patient should be at 90 degree angle at all times to help with breathing mechanics and will monitor.  · Stepped back to ICU 8/31 for this, requires frequent suctioning, jaw thrust improves this, mom reports improving now on step down 9/3, continue nebs and supportive care  ·      Pneumonia due to Pseudomonas aerginuosa and Serratia marcesens  · Patient admitted with acute hypoxic respiratory failure and sepsis. Blood and urine cultures negative and C. Diff negative but respiratory culture grew Serratia and Pseudomonas. Procal improved since initiation of antibiotics and WBC normalized and sepsis resolved (procal 4--. 3)  -see above for acute resp failure, ID consulted 9/3 for 8/31 serratia with FQ  resistance now, Zosyn started. Continue now.   -1 week at least per ID (9/10)        Encephalopathy acute  · Improved, at baseline on step down 9/3 per mom, non verbal at baseline but interacts with his mom somewhat  · Patient with decreased mentation from baseline on admit. Patient transferred to [facility] for further evaluation. EEG done and showed no status epilepticus and encephalopathy felt related to sepsis/infection.     Decubitus ulcer of left buttock, stage 2  Deep tissue injury  Wounds present on admit an wound care- mom reports had very bad stool issues a month ago causing these worsening wounds prior to admit  -wound care recs triad cream to bilatearl ischium, scrotum, r wrist pain daily with betadine  -nonviable tissue to sacrum (See pictures)  -gen surg consultd 9/3 for debridmenet, consulted, feel tissue does not need debridement now on exam but likely were looking at area that wound care was not concerned about as wound care called 9/4 and stated this, will talk to them again after CT results below.  -CT abdomen/pelvis not showing bone involved, debrided with gen surg 9/7, right side with underlying abscess cavity tracking inferior and laterally about 2 cm, packed with wet to dry gauze dressings, BID drsesing changes needed  Continue to Turn every 2 hrs. Pad bony prominences with pillows or foam dressings.         Seizure disorder  Chronic and controlled. EEG done on this admit showed no status epilepticus.   -Continue Phenobarbital 100 mg nightly via G tube to treat as patient takes at home         Anemia of chronic disease  Chronic and controlled. Monitor with daily CBC and transfuse if Hgb < 7.   -Hg between 7-8 now but has has had slow downtick to lower 7s now. Close to transfusion threshold on 9/4, may require tomorrow. No signs of active bleeding.  -stable high 7's low 8's        Hypokalemia  Hypophosphatemia  -replace PRN          Hypernatremia  · Patient developed hypernatremia in ICU  related to normal saline infusion and free water depletion. Normal saline infusion stopped on am of 8/29 when sodium got up to 150.  · Had issuse with lower Na with free water flushes so stopped in ICU on step up 8/31, currently normal Na on TF without extra free water   · -no issues on SD again currently             Severe protein-calorie malnutrition  · Patient with obvious muscle wasting and BMI of 15.8. Nutrition consulted on admit.   · Patient started on TF with Peptamen 1.5 with prebio at 40 cc/hr.         Cerebral palsy  Patient with poor functional baseline and bed bound with extremity contractures and total care at home.    Line associated UE clot  -line removed after Left basilic thrombus found in fever work up. RIght cephalic not well visualized, cannot rule out  -as line associated, 3 months anticoag recommmended usually. On heparin now, likely can convert to NOAC     Diarrhea  Protocolitis  -C dff negative  -will add immodium to see if improves this  -tube feeds could be contributor but have changed since home and diarrhea has persisted since then  -seen in CT early august and again 9/4  -GI consulted- stool studies pending, will follow, plan for flex sig 9/8 vs 9/9 to assess.    Sleep Disturbance  -resumed home bendadryl and neurontin qhs          Diet:  Tube feeds- peptamen, NPO for possible flex sig tomorrow   DVT PPx:  TEDS/SCDS             Disposition:  Flex sig tues vs Wednesday  Stool studies  Earliest dc possible later in the week upcoming week    Ultimate plan is HH with mom with DME and close follow ups  Mom # if not in room for MD exams she is in [facility], stays 24/7, call her cell at [phone] and she will return immediatley    Discharge Planning   ROCÍO: 9/9/2020     Code Status: Full Code   Is the patient medically ready for discharge?: No    Reason for patient still in hospital (select all that apply): Patient unstable  Discharge Plan A: Home with family, Home Health   Discharge Delays:  None known at this time

## 2020-09-07 NOTE — H&P (VIEW-ONLY)
[facility]  Gastroenterology  Consult Note    Patient Name: Glen Moscoso  MRN: [id number]  Admission Date: 8/26/2020  Hospital Length of Stay: 12 days  Code Status: Full Code   Attending Provider: Quiana Armenta MD   Consulting Provider: Junior Sloan MD  Primary Care Physician: Yadi Lawson MD  Principal Problem:Acute respiratory failure with hypoxia    Inpatient consult to Gastroenterology  Consult performed by: Junior Sloan MD  Consult ordered by: Quiana Armenta MD        Subjective:     HPI:  24 male PMH cerebral palsy, seizures, encephalopathy, peg tube placement, initially admitted with respiratory distress requiring intubation, respiratory cultures notable for Pseudomonas and Serratia.  Initially started on Zosyn which he continues, Infectious Disease following.  GI consulted for one-month history of diarrhea, as well as rectosigmoid thickening seen on CT scan.    Patient has significant disability, nonverbal, history obtained through chart review and mother.  She notes over the past 1-2 months patient has been in and out of the hospital and on multiple courses of antibiotics.  Prior to hospital stays, patient was on significant bowel regimen for constipation, but after initiation of antibiotics, developed diarrhea.  C diff has been ruled out.  Upon transfer here, repeat imaging of abdomen showed markedly abnormal thickening and inflammatory change involving the rectosigmoid colon.  Mother notes he has never had diarrhea similar to this before.  She also endorses small amounts of bright red blood was seen in a few bowel movements prior to presentation, mixed with stool.    No reported fevers, abdominal pain    Past Medical History:   Diagnosis Date    Acid reflux     Cerebral palsy     History of hip surgery     S/P percutaneous endoscopic gastrostomy (PEG) tube placement     Seizures        History reviewed. No pertinent surgical history.    Review of patient's  allergies indicates:  No Known Allergies  Family History     None        Tobacco Use    Smoking status: Never Smoker    Smokeless tobacco: Never Used   Substance and Sexual Activity    Alcohol use: Never     Frequency: Never    Drug use: Not Currently    Sexual activity: Not on file     Review of Systems   Unable to perform ROS: Patient nonverbal     Objective:     Vital Signs (Most Recent):  Temp: 98.9 °F (37.2 °C) (09/07/20 0526)  Pulse: 102 (09/07/20 0526)  Resp: 18 (09/07/20 0526)  BP: (!) 117/59 (09/07/20 0526)  SpO2: 95 % (09/07/20 0526) Vital Signs (24h Range):  Temp:  [98 °F (36.7 °C)-100.9 °F (38.3 °C)] 98.9 °F (37.2 °C)  Pulse:  [] 102  Resp:  [16-18] 18  SpO2:  [92 %-100 %] 95 %  BP: (105-121)/(55-75) 117/59     Weight: 33.1 kg (73 lb) (08/26/20 1005)  Body mass index is 15.8 kg/m².      Intake/Output Summary (Last 24 hours) at 9/7/2020 1034  Last data filed at 9/6/2020 2100  Gross per 24 hour   Intake 840 ml   Output 0 ml   Net 840 ml       Lines/Drains/Airways     Drain                 Gastrostomy/Enterostomy 08/04/20 1653 Percutaneous endoscopic gastrostomy (PEG) feeding 33 days          Peripheral Intravenous Line                 Peripheral IV - Single Lumen 09/01/20 1449 22 G Left Antecubital 5 days         Peripheral IV - Single Lumen 09/02/20 1200 20 G;1 3/4 in Right Forearm 4 days                Physical Exam    Gen:  Contracted, lying in bed  HENT: NCAT, dry oropharynx  Eyes: anicteric sclerae, EOMI grossly  Neck:  Contorted, fixed  Cardiac: RRR, no M/R/G, S1/S2 present  Lungs: CTAB, no crackles, no wheezes  Abd: soft, NT/ND, normoactive BS  Ext: no LE edema, warm, well perfused, contracted  Skin: skin intact on exposed body parts, no visible rashes, lesions  Neuro:  Nonverbal, minimal movement on exam      Significant Labs:  CBC:   Recent Labs   Lab 09/06/20  0550 09/07/20  0713   WBC 7.79 7.47   HGB 8.2* 7.8*   HCT 27.4* 25.9*   * 636*     CMP:   Recent Labs   Lab  09/07/20  0713   GLU 89   CALCIUM 8.1*   ALBUMIN 1.7*   PROT 6.4      K 4.1   CO2 24      BUN 7   CREATININE 0.5   ALKPHOS 145*   ALT 19   AST 23   BILITOT 0.2     Coagulation: No results for input(s): PT, INR, APTT in the last 48 hours.  CRP:   Recent Labs   Lab 09/06/20  0746   CRP 98.5*     ESR: No results for input(s): SEDRATE in the last 48 hours.    Significant Imaging:  CT: I have reviewed all results within the past 24 hours and my personal findings are:  As per HPI rectosigmoid thickening  Imaging results within the past 24 hours have been reviewed.    Assessment/Plan:     Colitis  CT A/P concerning for thickening of the recto-sigmoid concerning for infectious/inflammatory colitis.  Patient with correlating diarrhea x1 month    Recommendations:  -we will follow up stool culture to rule out infectious diarrhea  -tentatively we will plan for flex sig in next 1-2 days pending culture  -NPO at midnight in event of flex sig tomorrow          Thank you for your consult. I will follow-up with patient. Please contact us if you have any additional questions.    Junior Sloan MD  Gastroenterology  [facility]

## 2020-09-07 NOTE — HPI
24 male PMH cerebral palsy, seizures, encephalopathy, peg tube placement, initially admitted with respiratory distress requiring intubation, respiratory cultures notable for Pseudomonas and Serratia.  Initially started on Zosyn which he continues, Infectious Disease following.  GI consulted for one-month history of diarrhea, as well as rectosigmoid thickening seen on CT scan.    Patient has significant disability, nonverbal, history obtained through chart review and mother.  She notes over the past 1-2 months patient has been in and out of the hospital and on multiple courses of antibiotics.  Prior to hospital stays, patient was on significant bowel regimen for constipation, but after initiation of antibiotics, developed diarrhea.  C diff has been ruled out.  Upon transfer here, repeat imaging of abdomen showed markedly abnormal thickening and inflammatory change involving the rectosigmoid colon.  Mother notes he has never had diarrhea similar to this before.  She also endorses small amounts of bright red blood was seen in a few bowel movements prior to presentation, mixed with stool.    No reported fevers, abdominal pain

## 2020-09-08 ENCOUNTER — ANESTHESIA EVENT (OUTPATIENT)
Dept: ENDOSCOPY | Facility: HOSPITAL | Age: 25
DRG: 853 | End: 2020-09-08
Payer: MEDICAID

## 2020-09-08 LAB
ABO + RH BLD: NORMAL
ALBUMIN SERPL BCP-MCNC: 1.8 G/DL (ref 3.5–5.2)
ALP SERPL-CCNC: 159 U/L (ref 55–135)
ALT SERPL W/O P-5'-P-CCNC: 20 U/L (ref 10–44)
ANION GAP SERPL CALC-SCNC: 6 MMOL/L (ref 8–16)
AST SERPL-CCNC: 23 U/L (ref 10–40)
BACTERIA SPEC AEROBE CULT: ABNORMAL
BACTERIA SPEC AEROBE CULT: ABNORMAL
BASOPHILS # BLD AUTO: 0.03 K/UL (ref 0–0.2)
BASOPHILS NFR BLD: 0.5 % (ref 0–1.9)
BILIRUB SERPL-MCNC: 0.1 MG/DL (ref 0.1–1)
BLD GP AB SCN CELLS X3 SERPL QL: NORMAL
BLD PROD TYP BPU: NORMAL
BLOOD UNIT EXPIRATION DATE: NORMAL
BLOOD UNIT TYPE CODE: 600
BLOOD UNIT TYPE: NORMAL
BUN SERPL-MCNC: 8 MG/DL (ref 6–20)
CALCIUM SERPL-MCNC: 8.1 MG/DL (ref 8.7–10.5)
CHLORIDE SERPL-SCNC: 105 MMOL/L (ref 95–110)
CO2 SERPL-SCNC: 26 MMOL/L (ref 23–29)
CODING SYSTEM: NORMAL
CREAT SERPL-MCNC: 0.6 MG/DL (ref 0.5–1.4)
DIFFERENTIAL METHOD: ABNORMAL
DISPENSE STATUS: NORMAL
EOSINOPHIL # BLD AUTO: 0 K/UL (ref 0–0.5)
EOSINOPHIL NFR BLD: 0 % (ref 0–8)
ERYTHROCYTE [DISTWIDTH] IN BLOOD BY AUTOMATED COUNT: 17.7 % (ref 11.5–14.5)
EST. GFR  (AFRICAN AMERICAN): >60 ML/MIN/1.73 M^2
EST. GFR  (NON AFRICAN AMERICAN): >60 ML/MIN/1.73 M^2
GLUCOSE SERPL-MCNC: 120 MG/DL (ref 70–110)
GRAM STN SPEC: ABNORMAL
HCT VFR BLD AUTO: 23.3 % (ref 40–54)
HGB BLD-MCNC: 6.9 G/DL (ref 14–18)
IMM GRANULOCYTES # BLD AUTO: 0.01 K/UL (ref 0–0.04)
IMM GRANULOCYTES NFR BLD AUTO: 0.2 % (ref 0–0.5)
LYMPHOCYTES # BLD AUTO: 1.3 K/UL (ref 1–4.8)
LYMPHOCYTES NFR BLD: 20.7 % (ref 18–48)
MAGNESIUM SERPL-MCNC: 1.8 MG/DL (ref 1.6–2.6)
MCH RBC QN AUTO: 29.1 PG (ref 27–31)
MCHC RBC AUTO-ENTMCNC: 29.6 G/DL (ref 32–36)
MCV RBC AUTO: 98 FL (ref 82–98)
MONOCYTES # BLD AUTO: 0.8 K/UL (ref 0.3–1)
MONOCYTES NFR BLD: 12.6 % (ref 4–15)
NEUTROPHILS # BLD AUTO: 4 K/UL (ref 1.8–7.7)
NEUTROPHILS NFR BLD: 66 % (ref 38–73)
NRBC BLD-RTO: 0 /100 WBC
OB PNL STL: POSITIVE
PHOSPHATE SERPL-MCNC: 3.8 MG/DL (ref 2.7–4.5)
PLATELET # BLD AUTO: 668 K/UL (ref 150–350)
PMV BLD AUTO: 9.5 FL (ref 9.2–12.9)
POCT GLUCOSE: 80 MG/DL (ref 70–110)
POCT GLUCOSE: 95 MG/DL (ref 70–110)
POTASSIUM SERPL-SCNC: 3.6 MMOL/L (ref 3.5–5.1)
PROT SERPL-MCNC: 6.3 G/DL (ref 6–8.4)
RBC # BLD AUTO: 2.37 M/UL (ref 4.6–6.2)
SODIUM SERPL-SCNC: 137 MMOL/L (ref 136–145)
TRANS ERYTHROCYTES VOL PATIENT: NORMAL ML
WBC # BLD AUTO: 6.1 K/UL (ref 3.9–12.7)
WBC #/AREA STL HPF: NORMAL /[HPF]

## 2020-09-08 PROCEDURE — 25000003 PHARM REV CODE 250: Performed by: STUDENT IN AN ORGANIZED HEALTH CARE EDUCATION/TRAINING PROGRAM

## 2020-09-08 PROCEDURE — P9021 RED BLOOD CELLS UNIT: HCPCS

## 2020-09-08 PROCEDURE — 11000001 HC ACUTE MED/SURG PRIVATE ROOM

## 2020-09-08 PROCEDURE — 99900035 HC TECH TIME PER 15 MIN (STAT)

## 2020-09-08 PROCEDURE — 99233 SBSQ HOSP IP/OBS HIGH 50: CPT | Mod: ,,, | Performed by: HOSPITALIST

## 2020-09-08 PROCEDURE — 83735 ASSAY OF MAGNESIUM: CPT

## 2020-09-08 PROCEDURE — 99233 PR SUBSEQUENT HOSPITAL CARE,LEVL III: ICD-10-PCS | Mod: ,,, | Performed by: PHYSICIAN ASSISTANT

## 2020-09-08 PROCEDURE — 63600175 PHARM REV CODE 636 W HCPCS: Performed by: HOSPITALIST

## 2020-09-08 PROCEDURE — 86901 BLOOD TYPING SEROLOGIC RH(D): CPT

## 2020-09-08 PROCEDURE — 99900026 HC AIRWAY MAINTENANCE (STAT)

## 2020-09-08 PROCEDURE — 99233 SBSQ HOSP IP/OBS HIGH 50: CPT | Mod: ,,, | Performed by: PHYSICIAN ASSISTANT

## 2020-09-08 PROCEDURE — 85025 COMPLETE CBC W/AUTO DIFF WBC: CPT

## 2020-09-08 PROCEDURE — 36430 TRANSFUSION BLD/BLD COMPNT: CPT

## 2020-09-08 PROCEDURE — 63600175 PHARM REV CODE 636 W HCPCS: Performed by: STUDENT IN AN ORGANIZED HEALTH CARE EDUCATION/TRAINING PROGRAM

## 2020-09-08 PROCEDURE — 94761 N-INVAS EAR/PLS OXIMETRY MLT: CPT

## 2020-09-08 PROCEDURE — 94668 MNPJ CHEST WALL SBSQ: CPT

## 2020-09-08 PROCEDURE — 86920 COMPATIBILITY TEST SPIN: CPT

## 2020-09-08 PROCEDURE — 36415 COLL VENOUS BLD VENIPUNCTURE: CPT

## 2020-09-08 PROCEDURE — 84100 ASSAY OF PHOSPHORUS: CPT

## 2020-09-08 PROCEDURE — 25000003 PHARM REV CODE 250: Performed by: PHYSICIAN ASSISTANT

## 2020-09-08 PROCEDURE — 25000003 PHARM REV CODE 250: Performed by: HOSPITALIST

## 2020-09-08 PROCEDURE — 99233 PR SUBSEQUENT HOSPITAL CARE,LEVL III: ICD-10-PCS | Mod: ,,, | Performed by: HOSPITALIST

## 2020-09-08 PROCEDURE — 80053 COMPREHEN METABOLIC PANEL: CPT

## 2020-09-08 RX ORDER — HYDROCODONE BITARTRATE AND ACETAMINOPHEN 500; 5 MG/1; MG/1
TABLET ORAL
Status: DISCONTINUED | OUTPATIENT
Start: 2020-09-08 | End: 2020-09-11

## 2020-09-08 RX ORDER — PANTOPRAZOLE SODIUM 40 MG/1
40 FOR SUSPENSION ORAL DAILY
Status: DISCONTINUED | OUTPATIENT
Start: 2020-09-08 | End: 2020-09-11

## 2020-09-08 RX ADMIN — PROPRANOLOL HYDROCHLORIDE 20 MG: 20 TABLET ORAL at 04:09

## 2020-09-08 RX ADMIN — DIPHENHYDRAMINE HYDROCHLORIDE 12.5 MG: 25 SOLUTION ORAL at 11:09

## 2020-09-08 RX ADMIN — ACETAMINOPHEN 650 MG: 325 TABLET ORAL at 11:09

## 2020-09-08 RX ADMIN — BACLOFEN 10 MG: 10 TABLET ORAL at 09:09

## 2020-09-08 RX ADMIN — PIPERACILLIN SODIUM AND TAZOBACTAM SODIUM 4.5 G: 4; .5 INJECTION, POWDER, LYOPHILIZED, FOR SOLUTION INTRAVENOUS at 01:09

## 2020-09-08 RX ADMIN — POTASSIUM & SODIUM PHOSPHATES POWDER PACK 280-160-250 MG 2 PACKET: 280-160-250 PACK at 11:09

## 2020-09-08 RX ADMIN — PIPERACILLIN SODIUM AND TAZOBACTAM SODIUM 4.5 G: 4; .5 INJECTION, POWDER, LYOPHILIZED, FOR SOLUTION INTRAVENOUS at 09:09

## 2020-09-08 RX ADMIN — POTASSIUM & SODIUM PHOSPHATES POWDER PACK 280-160-250 MG 2 PACKET: 280-160-250 PACK at 04:09

## 2020-09-08 RX ADMIN — POTASSIUM & SODIUM PHOSPHATES POWDER PACK 280-160-250 MG 2 PACKET: 280-160-250 PACK at 10:09

## 2020-09-08 RX ADMIN — PIPERACILLIN SODIUM AND TAZOBACTAM SODIUM 4.5 G: 4; .5 INJECTION, POWDER, LYOPHILIZED, FOR SOLUTION INTRAVENOUS at 04:09

## 2020-09-08 RX ADMIN — PANTOPRAZOLE SODIUM 40 MG: 40 GRANULE, DELAYED RELEASE ORAL at 04:09

## 2020-09-08 RX ADMIN — PROPRANOLOL HYDROCHLORIDE 20 MG: 20 TABLET ORAL at 11:09

## 2020-09-08 RX ADMIN — Medication 1 CAPSULE: at 09:09

## 2020-09-08 RX ADMIN — PHENOBARBITAL 100 MG: 20 ELIXIR ORAL at 11:09

## 2020-09-08 RX ADMIN — BACLOFEN 10 MG: 10 TABLET ORAL at 04:09

## 2020-09-08 RX ADMIN — POTASSIUM & SODIUM PHOSPHATES POWDER PACK 280-160-250 MG 2 PACKET: 280-160-250 PACK at 06:09

## 2020-09-08 RX ADMIN — PROPRANOLOL HYDROCHLORIDE 20 MG: 20 TABLET ORAL at 09:09

## 2020-09-08 RX ADMIN — HEPARIN SODIUM 5000 UNITS: 5000 INJECTION INTRAVENOUS; SUBCUTANEOUS at 09:09

## 2020-09-08 RX ADMIN — BACLOFEN 10 MG: 10 TABLET ORAL at 11:09

## 2020-09-08 NOTE — PROGRESS NOTES
Progress Note   Hospital Medicine         Patient Name: Glen Moscoso  MRN:  [id number]  [facility] Medicine Team: [facility] MED K Quiana Armenta MD  Date of Admission:  8/26/2020     Length of Stay:  LOS: 13 days   Expected Discharge Date: 9/9/2020  Principal Problem:  Acute respiratory failure with hypoxia       Subjective:     Interval History/Overnight Events:    Serratia in sputum still from 9/6 similar sensitivity, will see ID thoughts.  Plan for flex sig tomorrow. Stool studies pending. FOBT + and Hg dipped below 7 today after being mid-higher 7's the last few days. Colitis on imaging, assessment tomorrow. 1 U PRBCs today. protonix via G tube added as not suspected upper gi bleed, hold heparin in interim in case of bx tomrrow, hold tube feeds for flex sig tomororw  Mom updated on plans, at bedside now.         Review of Systems   Unable to assess secondary to baseline cognitive status.  All other systems reviewed and are negative.    Objective:     Temp:  [97.5 °F (36.4 °C)-99.1 °F (37.3 °C)]   Pulse:  []   Resp:  [16-20]   BP: (105-132)/(55-65)   SpO2:  [93 %-99 %]       Physical Exam:  Constitutional: Appears underweight, chronically ill baseline. Nonverbal. Contractures.  Head: Normocephalic and atraumatic.   Mouth/Throat: Oropharynx is clear and moist.   Eyes: EOM are normal. Pupils are equal, round, and reactive to light. No scleral icterus.   Neck: Normal range of motion. Neck supple. some contracture in neck.  Cardiovascular: Normal rate and regular rhythm.  No murmur heard.  Pulmonary/Chest: Effort normal and breath sounds normal. Coarse breath sounds. On room air.   Abdominal: Soft. Bowel sounds are normal.  No distension or tenderness. G tube.  Musculoskeletal: muscle wasting. Contractures of both legs. No edema.  Neurological: not alert to place or person, baseline status. No gross abnormalities on minimal neuro exam- moving all 4 extremities. Cannot assess cranial nerves or sensation. bedbound  baseline.  Skin: Skin is warm and dry. sacral ulcerations with full thickness breakdown seen with black nonviable tissue (See wound care pictures). Keloid type changes on bilatearl hips. Peripheral IV in left arm.  Psychiatric: Normal mood and affect. Behavior is normal.     Recent Labs   Lab 09/03/20  0809 09/04/20  0508 09/05/20  0413 09/06/20  0550 09/07/20  0713 09/08/20  0558   WBC 9.85 9.51 7.66 7.79 7.47 6.10   HGB 7.8* 7.2* 7.5* 8.2* 7.8* 6.9*   HCT 25.7* 22.7* 24.7* 27.4* 25.9* 23.3*   * 467* 569* 578* 636* 668*     Recent Labs   Lab 09/06/20  0550 09/07/20  0713 09/08/20  0558    136 137   K 3.9 4.1 3.6    104 105   CO2 25 24 26   BUN 6 7 8   CREATININE 0.5 0.5 0.6   GLU 85 89 120*   CALCIUM 8.2* 8.1* 8.1*   MG 1.6 1.9 1.8   PHOS 2.6* 3.6 3.8     Recent Labs   Lab 09/06/20  0550 09/07/20  0713 09/08/20  0558   ALKPHOS 163* 145* 159*   ALT 21 19 20   AST 20 23 23   ALBUMIN 1.7* 1.7* 1.8*   PROT 6.5 6.4 6.3   BILITOT 0.2 0.2 0.1     Recent Labs   Lab 09/04/20  1838 09/05/20  1646 09/06/20  1640 09/08/20  1111   POCTGLUCOSE 81 104 92 95        baclofen  10 mg Per G Tube TID    diphenhydrAMINE  12.5 mg Per G Tube QHS    gabapentin  250 mg Per G Tube QHS    Lactobacillus rhamnosus GG  1 capsule Per G Tube Daily    pantoprazole  40 mg Per G Tube Daily    PHENobarbitaL  100 mg Per G Tube QHS    piperacillin-tazobactam (ZOSYN) IVPB  4.5 g Intravenous Q8H    potassium, sodium phosphates  2 packet Per G Tube QID (AC & HS)    propranoloL  20 mg Per G Tube TID    [START ON 9/9/2020] sodium phosphates  1 enema Rectal Once    [START ON 9/9/2020] sodium phosphates  1 enema Rectal Once       Assessment and Plan     Mr. Glen Moscoso is a 24 y.o. male who presented to [facility] on 8/26/2020 with     Acute respiratory failure with hypoxia  Serratia/Pseudomonas Pneumonia--> with reoccurrent Resistant pneumonia     · Patient intubated upon transfer from outside facility and extubated 8/27  ·   Patient found to have pneumonia and felt cause of respiratory failure, treated with course of cipro, stepped back up to ICU 8/31 for worse resp fx and stridor, improved with chin thrust manuevers but still required extra stay, requires frequent suctioning for this  · Sputum Cx growing serratia again on 8/31 while on treatment with cipro, still febrile every day to 101 multiple times, sputum less per mom and CXR improved but still has episodes likely aspiration with increased HR, and fever, on RA with sats 92-98% now. Sensitivities returned on 9/3 with Cipro resistant now, zosyn started, ID following, recommend to continue, chest CT with R GOO likely the pna, no clots. Has been on RA since SD, afebrile since zosyn. [place] 1 week zosyn per ID  · Recheck sputum 9/6 for fever, with same serratia and sensitiivty. Will see ID recs.               Biphasic stridor  · Patient with acute development of stridor after extubation of 8/27 but acutely worsened on 8/30.   · Likely mechanical due to neck muscle weakness related to his advance cerebral palsy as improved when patient was sat up and chin was pulled up) and recent acute illnesses but also likely a degree of airway inflammation as contributing to upper air obstruction.   · Patient should be at 90 degree angle at all times to help with breathing mechanics and will monitor.  · Stepped back to ICU 8/31 for this, requires frequent suctioning, jaw thrust improves this, mom reports improving now on step down 9/3, continue nebs and supportive care  ·      Pneumonia due to Pseudomonas aerginuosa and Serratia marcesens  · Patient admitted with acute hypoxic respiratory failure and sepsis. Blood and urine cultures negative and C. Diff negative but respiratory culture grew Serratia and Pseudomonas. Procal improved since initiation of antibiotics and WBC normalized and sepsis resolved (procal 4--. 3)  -see above for acute resp failure, ID consulted 9/3 for 8/31 serratia with FQ  resistance now, Zosyn started. Continue now.   -1 week at least per ID (9/10). Serratia still in sputum 9/6.        Encephalopathy acute  · Improved, at baseline on step down 9/3 per mom, non verbal at baseline but interacts with his mom somewhat  · Patient with decreased mentation from baseline on admit. [facility] for further evaluation. EEG done and showed no status epilepticus and encephalopathy felt related to sepsis/infection.     Decubitus ulcer of left buttock, stage 2  Deep tissue injury  Wounds present on admit an wound care- mom reports had very bad stool issues a month ago causing these worsening wounds prior to admit  -wound care recs triad cream to bilatearl ischium, scrotum, r wrist pain daily with betadine  -nonviable tissue to sacrum (See pictures)  -gen surg consultd 9/3 for debridmenet, consulted, feel tissue does not need debridement now on exam but likely were looking at area that wound care was not concerned about as wound care called 9/4 and stated this, will talk to them again after CT results below.  -CT abdomen/pelvis not showing bone involved, debrided with gen surg 9/7, right side with underlying abscess cavity tracking inferior and laterally about 2 cm, packed with wet to dry gauze dressings, BID drsesing changes needed  Continue to Turn every 2 hrs. Pad bony prominences with pillows or foam dressings.         Seizure disorder  Chronic and controlled. EEG done on this admit showed no status epilepticus.   -Continue Phenobarbital 100 mg nightly via G tube to treat as patient takes at home         Anemia of chronic disease  Acute blood lsos anemia  Chronic and controlled. Monitor with daily CBC and transfuse if Hgb < 7.   -Hg between 7-8 now but has has had slow downtick to lower 7s now. Close to transfusion threshold on 9/4, may require tomorrow. No signs of active bleeding.  -stable high 7's low 8's  9/8: dipped again to 6.7 from 7.8 the last few days with FOBT  + in setting of imaging for colitis. 1 U PRBCS. Hold heparin, protonix PO as not suspected upper GI bleed.        Hypokalemia  Hypophosphatemia  -replace PRN          Hypernatremia  · Patient developed hypernatremia in ICU related to normal saline infusion and free water depletion. Normal saline infusion stopped on am of 8/29 when sodium got up to 150.  · Had issuse with lower Na with free water flushes so stopped in ICU on step up 8/31, currently normal Na on TF without extra free water   · -no issues on SD again currently             Severe protein-calorie malnutrition  · Patient with obvious muscle wasting and BMI of 15.8. Nutrition consulted on admit.   · Patient started on TF with Peptamen 1.5 with prebio at 40 cc/hr.         Cerebral palsy  Patient with poor functional baseline and bed bound with extremity contractures and total care at home.    Line associated UE clot  -line removed after Left basilic thrombus found in fever work up. RIght cephalic not well visualized, cannot rule out  -as line associated, 3 months anticoag recommmended usually. On heparin now, likely can convert to NOAC     Diarrhea  Protocolitis  -C dff negative  -will add immodium to see if improves this  -tube feeds could be contributor but have changed since home and diarrhea has persisted since then  -seen in CT early august and again 9/4  -GI consulted- stool studies pending, will follow, plan for flex sig 9/8 vs 9/9 to assess.  9/8: flex sig tomorrow, npo for feeds.     Sleep Disturbance  -resumed home bendadryl and neurontin qhs          Diet:  NPO   DVT PPx:  TEDS/SCDS, holding heparin  9/8             Disposition:  1U today, flex sig tomorrow. Hold feeds    Earliest dc possible later in the week upcoming week    Ultimate plan is HH with mom with DME and close follow ups  Mom # if not in room for MD exams she is in lobby, stays 24/7, call her cell at [phone] and she will return immediatley    Discharge Planning   ROCÍO: 9/9/2020      Code Status: Full Code   Is the patient medically ready for discharge?: No    Reason for patient still in hospital (select all that apply): Patient unstable  Discharge Plan A: Home with family, Home Health   Discharge Delays: None known at this time

## 2020-09-08 NOTE — ANESTHESIA PREPROCEDURE EVALUATION
[facility]  Anesthesia Pre-Operative Evaluation         Patient Name: Glen Moscoso  YOB: 1995  MRN: [id number]    SUBJECTIVE:     Pre-operative evaluation for Procedure(s) (LRB):  SIGMOIDOSCOPY, FLEXIBLE (N/A)     09/08/2020    Glen Moscoso is a 24 y.o. male w/ a significant PMHx of nonverbal, cerebral palsy, seizures, encephalopathy, peg tube placement who was admitted due to acute hypoxic RF requiring intubation likely 2/2 to PNA (cultures grew Pseudomonas and Serratia.)  She's since been extubated and stepped down to .  Repeat imaging of abdomen showed markedly abnormal thickening and inflammatory change involving the rectosigmoid colon.   GI following due to a several month hx of diarrhea.      Patient now presents for the above procedure(s).    TTE 8/26/20:  · Low normal left ventricular systolic function. The estimated ejection fraction is 45-50%.  · Normal right ventricular systolic function.  · Mechanically ventilated; cannot use inferior caval vein diameter to estimate central venous pressure.    LDA:        Peripheral IV - Single Lumen 09/07/20 1820 20 G Anterior;Left;Proximal Forearm (Active)   Site Assessment Clean;Dry;Intact 09/08/20 0730   Line Status Flushed 09/08/20 0730   Dressing Status Clean;Dry;Intact 09/08/20 0730   Dressing Intervention Integrity maintained 09/08/20 0730   Dressing Change Due 09/11/20 09/08/20 0730   Site Change Due 09/11/20 09/08/20 0730   Number of days: 0            Gastrostomy/Enterostomy 08/04/20 1653 Percutaneous endoscopic gastrostomy (PEG) feeding (Active)   Securement secured to abdomen 09/08/20 0730   Interventions Prior to Feeding residual checked 09/08/20 0730   Suction Setting/Drainage Method dependent drainage 09/02/20 0305   Drainage milky 08/30/20 0400   Feeding Type continuous;by pump 09/08/20 0730   Clamp Status/Tolerance no residual 09/08/20 0730   Feeding Action feeding continued 09/08/20 0730   Dressing dry and intact  09/08/20 0730   Insertion Site no redness;no warmth;no drainage;no tenderness;no swelling 09/08/20 0730   Site Care sterile precut T-slit dressing applied 09/08/20 0730   Flush/Irrigation flushed w/;water 09/08/20 0730   Current Rate (mL/hr) 40 mL/hr 09/08/20 0730   Goal Rate (mL/hr) 40 mL/hr 09/08/20 0730   Intake (mL) 320 mL 09/03/20 0600   Water Bolus (mL) 100 mL 09/07/20 2110   Tube Output(mL)(Include Discarded Residual) 0 mL 09/06/20 1538   Formula Name Peptamin 1.5 09/08/20 0730   Tube Feeding Intake (mL) 240 09/06/20 1538   Residual Amount (ml) 0 ml 09/08/20 0730   Number of days: 34       Prev airway: None documented.    Drips: None documented.      Patient Active Problem List   Diagnosis    Hypokalemia    Cerebral palsy    Pneumonia due to Pseudomonas aerginuosa and Serratia marcesens    Decubitus ulcer of left buttock, stage 2    Colitis    Seizure disorder    Encephalopathy acute    Deep tissue injury    Severe protein-calorie malnutrition    Biphasic stridor    Acute respiratory failure with hypoxia    Bacterial infection due to Serratia    Anemia of chronic disease    Hypernatremia    Hypophosphatemia    Status epilepticus    Fever       Review of patient's allergies indicates:  No Known Allergies    Current Inpatient Medications:   baclofen  10 mg Per G Tube TID    diphenhydrAMINE  12.5 mg Per G Tube QHS    gabapentin  250 mg Per G Tube QHS    Lactobacillus rhamnosus GG  1 capsule Per G Tube Daily    pantoprazole  40 mg Per G Tube Daily    PHENobarbitaL  100 mg Per G Tube QHS    piperacillin-tazobactam (ZOSYN) IVPB  4.5 g Intravenous Q8H    potassium, sodium phosphates  2 packet Per G Tube QID (AC & HS)    propranoloL  20 mg Per G Tube TID    [START ON 9/9/2020] sodium phosphates  1 enema Rectal Once    [START ON 9/9/2020] sodium phosphates  1 enema Rectal Once       No current facility-administered medications on file prior to encounter.      Current Outpatient Medications  on File Prior to Encounter   Medication Sig Dispense Refill    baclofen (LIORESAL) 10 MG tablet Take 10 mg by mouth 3 (three) times daily.      cholestyramine (QUESTRAN) 4 gram packet Take 1 packet (4 g total) by mouth 2 (two) times daily. 60 packet 0    diphenhydrAMINE (BENADRYL) 12.5 mg/5 mL elixir Take by mouth 4 (four) times daily as needed for Allergies.      gabapentin (NEURONTIN) 250 mg/5 mL solution Take by mouth nightly. At bedtime      ibuprofen (ADVIL,MOTRIN) 100 mg/5 mL suspension Take by mouth every 6 (six) hours as needed for Temperature greater than.      Lactobacillus rhamnosus GG (CULTURELLE) 10 billion cell capsule 1 capsule by Per G Tube route once daily.      nystatin (MYCOSTATIN) cream Apply topically 2 (two) times daily.      PHENobarbitaL 20 mg/5 mL (4 mg/mL) Elix elixir Take 25 mLs (100 mg total) by mouth every evening. 750 mL 0    propranoloL (INDERAL) 20 MG tablet Take 1 tablet (20 mg total) by mouth 3 (three) times daily. 90 tablet 0       History reviewed. No pertinent surgical history.    Social History     Socioeconomic History    Marital status: Single     Spouse name: Not on file    Number of children: Not on file    Years of education: Not on file    Highest education level: Not on file   Occupational History    Not on file   Social Needs    Financial resource strain: Not on file    Food insecurity     Worry: Not on file     Inability: Not on file    Transportation needs     Medical: Not on file     Non-medical: Not on file   Tobacco Use    Smoking status: Never Smoker    Smokeless tobacco: Never Used   Substance and Sexual Activity    Alcohol use: Never     Frequency: Never    Drug use: Not Currently    Sexual activity: Not on file   Lifestyle    Physical activity     Days per week: Not on file     Minutes per session: Not on file    Stress: Not on file   Relationships    Social connections     Talks on phone: Not on file     Gets together: Not on file      Attends Protestant service: Not on file     Active member of club or organization: Not on file     Attends meetings of clubs or organizations: Not on file     Relationship status: Not on file   Other Topics Concern    Not on file   Social History Narrative    Not on file       OBJECTIVE:     Vital Signs Range (Last 24H):  Temp:  [36.4 °C (97.5 °F)-37.3 °C (99.1 °F)]   Pulse:  []   Resp:  [18-20]   BP: (105-132)/(55-65)   SpO2:  [93 %-99 %]       Significant Labs:  Lab Results   Component Value Date    WBC 6.10 09/08/2020    HGB 6.9 (L) 09/08/2020    HCT 23.3 (L) 09/08/2020     (H) 09/08/2020    ALT 20 09/08/2020    AST 23 09/08/2020     09/08/2020    K 3.6 09/08/2020     09/08/2020    CREATININE 0.6 09/08/2020    BUN 8 09/08/2020    CO2 26 09/08/2020    INR 1.1 09/01/2020    HGBA1C 5.5 08/26/2020       Diagnostic Studies: No relevant studies.    EKG:   Results for orders placed or performed during the hospital encounter of 08/26/20   EKG 12-lead    Collection Time: 08/30/20  7:00 AM    Narrative    Test Reason : R00.0,    Vent. Rate : 116 BPM     Atrial Rate : 116 BPM     P-R Int : 122 ms          QRS Dur : 062 ms      QT Int : 336 ms       P-R-T Axes : 048 041 -51 degrees     QTc Int : 467 ms    Sinus tachycardia  Nonspecific ST and/or T wave abnormalities  Abnormal ECG  When compared with ECG of 07-AUG-2020 10:04,  T wave inversion now evident in Inferior leads  T wave inversion now evident in Anterior-lateral leads  Confirmed by Milo Perez MD ([id number]) on 8/30/2020 8:10:20 PM    Referred By: GUY LEFORT           Confirmed By:Milo Perez MD       ECHOCARDIOGRAM:  TTE:  Results for orders placed or performed during the hospital encounter of 08/26/20   Echo Color Flow Doppler? Yes   Result Value Ref Range    Ascending aorta 2.56 cm    STJ 2.62 cm    AV mean gradient 2 mmHg    Ao peak josé antonio 0.76 m/s    Ao VTI 10.42 cm    IVS 0.60 (A) 0.6 - 1.1 cm    LA size 2.15 cm    Left Atrium Major Axis 2.76  cm    LVIDD 4.00 3.5 - 6.0 cm    LVIDS 3.20 2.1 - 4.0 cm    LVOT diameter 2.02 cm    LVOT peak VTI 9.19 cm    PW 0.50 (A) 0.6 - 1.1 cm    RA Major Axis 2.54 cm    RA Width 2.33 cm    RVDD 2.31 cm    Sinus 2.22 cm    TAPSE 1.26 cm    LA WIDTH 2.56 cm    LV Diastolic Volume 50.78 mL    LV Systolic Volume 22.47 mL    LVOT peak josé antonio 0.62 m/s    FS 20 %    LV mass 57.72 g    Left Ventricle Relative Wall Thickness 0.25 cm    AV valve area 2.83 cm2    AV Velocity Ratio 0.82     AV index (prosthetic) 0.88     LVOT area 3.2 cm2    LVOT stroke volume 29.44 cm3    AV peak gradient 2 mmHg    LV Systolic Volume Index 19.2 mL/m2    LV Diastolic Volume Index 43.33 mL/m2    LV Mass Index 49 g/m2    BSA 1.15 m2    Narrative    · Low normal left ventricular systolic function. The estimated ejection   fraction is 45-50%.  · Normal right ventricular systolic function.  · Mechanically ventilated; cannot use inferior caval vein diameter to   estimate central venous pressure.              ASSESSMENT/PLAN:         Anesthesia Evaluation    I have reviewed the Patient Summary Reports.    I have reviewed the Nursing Notes.    I have reviewed the Medications.     Review of Systems  Social:  Non-Smoker, No Alcohol Use    Hematology/Oncology:     Oncology Normal    -- Anemia: Hematology Comments: Hemoglobin 6.9    EENT/Dental:EENT/Dental Normal   Cardiovascular:   Denies Hypertension.  Denies CABG/stent.  Denies Dysrhythmias.  CHF    Pulmonary:   Pneumonia Denies COPD.    Renal/:  Renal/ Normal     Hepatic/GI:   GERD    Musculoskeletal:  Musculoskeletal Normal    Neurological:   Neuromuscular Disease, Seizures NONVERBAL   Endocrine:  Endocrine Normal    Psych:  Psychiatric Normal           Physical Exam  General:  Cachexia    Airway/Jaw/Neck:  AIRWAY FINDINGS: Normal Difficult to examine 2/2 mental status    Dental:  DENTAL FINDINGS: Normal   Chest/Lungs:  Chest/Lungs Findings: Clear to auscultation     Heart/Vascular:  Heart Findings: Rate:  Normal  Rhythm: Regular Rhythm        Mental Status:  Mental Status Findings:  Confusion         Anesthesia Plan  Type of Anesthesia, risks & benefits discussed:  Anesthesia Type:  general, MAC  Patient's Preference:   Intra-op Monitoring Plan: standard ASA monitors  Intra-op Monitoring Plan Comments:   Post Op Pain Control Plan: per primary service following discharge from PACU, IV/PO Opioids PRN and multimodal analgesia  Post Op Pain Control Plan Comments:   Induction:   IV  Beta Blocker:  Patient is not currently on a Beta-Blocker (No further documentation required).       Informed Consent: Patient representative understands risks and agrees with Anesthesia plan.  Questions answered. Anesthesia consent signed with patient representative.  ASA Score: 3     Day of Surgery Review of History & Physical: I have interviewed and examined the patient. I have reviewed the patient's H&P dated:    H&P update referred to the provider.         Ready For Surgery From Anesthesia Perspective.

## 2020-09-09 ENCOUNTER — ANESTHESIA (OUTPATIENT)
Dept: ENDOSCOPY | Facility: HOSPITAL | Age: 25
DRG: 853 | End: 2020-09-09
Payer: MEDICAID

## 2020-09-09 LAB
ALBUMIN SERPL BCP-MCNC: 2.3 G/DL (ref 3.5–5.2)
ALP SERPL-CCNC: 175 U/L (ref 55–135)
ALT SERPL W/O P-5'-P-CCNC: 24 U/L (ref 10–44)
ANION GAP SERPL CALC-SCNC: 11 MMOL/L (ref 8–16)
AST SERPL-CCNC: 24 U/L (ref 10–40)
BASOPHILS # BLD AUTO: 0.06 K/UL (ref 0–0.2)
BASOPHILS NFR BLD: 0.6 % (ref 0–1.9)
BILIRUB SERPL-MCNC: 0.4 MG/DL (ref 0.1–1)
BUN SERPL-MCNC: 8 MG/DL (ref 6–20)
CALCIUM SERPL-MCNC: 9 MG/DL (ref 8.7–10.5)
CHLORIDE SERPL-SCNC: 105 MMOL/L (ref 95–110)
CO2 SERPL-SCNC: 24 MMOL/L (ref 23–29)
CREAT SERPL-MCNC: 0.6 MG/DL (ref 0.5–1.4)
DIFFERENTIAL METHOD: ABNORMAL
EOSINOPHIL # BLD AUTO: 0 K/UL (ref 0–0.5)
EOSINOPHIL NFR BLD: 0.1 % (ref 0–8)
ERYTHROCYTE [DISTWIDTH] IN BLOOD BY AUTOMATED COUNT: 17.4 % (ref 11.5–14.5)
EST. GFR  (AFRICAN AMERICAN): >60 ML/MIN/1.73 M^2
EST. GFR  (NON AFRICAN AMERICAN): >60 ML/MIN/1.73 M^2
GLUCOSE SERPL-MCNC: 79 MG/DL (ref 70–110)
HCT VFR BLD AUTO: 35.8 % (ref 40–54)
HGB BLD-MCNC: 10.8 G/DL (ref 14–18)
IMM GRANULOCYTES # BLD AUTO: 0.04 K/UL (ref 0–0.04)
IMM GRANULOCYTES NFR BLD AUTO: 0.4 % (ref 0–0.5)
LYMPHOCYTES # BLD AUTO: 1.4 K/UL (ref 1–4.8)
LYMPHOCYTES NFR BLD: 13 % (ref 18–48)
MAGNESIUM SERPL-MCNC: 1.7 MG/DL (ref 1.6–2.6)
MCH RBC QN AUTO: 29.6 PG (ref 27–31)
MCHC RBC AUTO-ENTMCNC: 30.2 G/DL (ref 32–36)
MCV RBC AUTO: 98 FL (ref 82–98)
MONOCYTES # BLD AUTO: 1.2 K/UL (ref 0.3–1)
MONOCYTES NFR BLD: 11.7 % (ref 4–15)
NEUTROPHILS # BLD AUTO: 7.8 K/UL (ref 1.8–7.7)
NEUTROPHILS NFR BLD: 74.2 % (ref 38–73)
NRBC BLD-RTO: 0 /100 WBC
PHOSPHATE SERPL-MCNC: 3.7 MG/DL (ref 2.7–4.5)
PLATELET # BLD AUTO: 893 K/UL (ref 150–350)
PMV BLD AUTO: 9.5 FL (ref 9.2–12.9)
POTASSIUM SERPL-SCNC: 4.1 MMOL/L (ref 3.5–5.1)
PROT SERPL-MCNC: 7.8 G/DL (ref 6–8.4)
RBC # BLD AUTO: 3.65 M/UL (ref 4.6–6.2)
SARS-COV-2 RDRP RESP QL NAA+PROBE: NEGATIVE
SODIUM SERPL-SCNC: 140 MMOL/L (ref 136–145)
WBC # BLD AUTO: 10.47 K/UL (ref 3.9–12.7)

## 2020-09-09 PROCEDURE — 99233 PR SUBSEQUENT HOSPITAL CARE,LEVL III: ICD-10-PCS | Mod: ,,, | Performed by: NURSE PRACTITIONER

## 2020-09-09 PROCEDURE — 25000003 PHARM REV CODE 250: Performed by: STUDENT IN AN ORGANIZED HEALTH CARE EDUCATION/TRAINING PROGRAM

## 2020-09-09 PROCEDURE — D9220A PRA ANESTHESIA: ICD-10-PCS | Mod: CRNA,,, | Performed by: NURSE ANESTHETIST, CERTIFIED REGISTERED

## 2020-09-09 PROCEDURE — 85025 COMPLETE CBC W/AUTO DIFF WBC: CPT

## 2020-09-09 PROCEDURE — 88342 IMHCHEM/IMCYTCHM 1ST ANTB: CPT | Mod: 26,,, | Performed by: PATHOLOGY

## 2020-09-09 PROCEDURE — 63600175 PHARM REV CODE 636 W HCPCS: Performed by: HOSPITALIST

## 2020-09-09 PROCEDURE — 88305 TISSUE EXAM BY PATHOLOGIST: ICD-10-PCS | Mod: 26,,, | Performed by: PATHOLOGY

## 2020-09-09 PROCEDURE — 25000003 PHARM REV CODE 250: Performed by: PHYSICIAN ASSISTANT

## 2020-09-09 PROCEDURE — 88342 CHG IMMUNOCYTOCHEMISTRY: ICD-10-PCS | Mod: 26,,, | Performed by: PATHOLOGY

## 2020-09-09 PROCEDURE — 99900035 HC TECH TIME PER 15 MIN (STAT)

## 2020-09-09 PROCEDURE — 25000003 PHARM REV CODE 250: Performed by: HOSPITALIST

## 2020-09-09 PROCEDURE — 88342 IMHCHEM/IMCYTCHM 1ST ANTB: CPT | Performed by: PATHOLOGY

## 2020-09-09 PROCEDURE — D9220A PRA ANESTHESIA: Mod: CRNA,,, | Performed by: NURSE ANESTHETIST, CERTIFIED REGISTERED

## 2020-09-09 PROCEDURE — 27201012 HC FORCEPS, HOT/COLD, DISP: Performed by: INTERNAL MEDICINE

## 2020-09-09 PROCEDURE — 11000001 HC ACUTE MED/SURG PRIVATE ROOM

## 2020-09-09 PROCEDURE — 45331 PR SIGMOIDOSCOPY,BIOPSY: ICD-10-PCS | Mod: ,,, | Performed by: INTERNAL MEDICINE

## 2020-09-09 PROCEDURE — 25000003 PHARM REV CODE 250: Performed by: NURSE ANESTHETIST, CERTIFIED REGISTERED

## 2020-09-09 PROCEDURE — 63600175 PHARM REV CODE 636 W HCPCS: Performed by: NURSE ANESTHETIST, CERTIFIED REGISTERED

## 2020-09-09 PROCEDURE — 94761 N-INVAS EAR/PLS OXIMETRY MLT: CPT

## 2020-09-09 PROCEDURE — 45331 SIGMOIDOSCOPY AND BIOPSY: CPT | Mod: ,,, | Performed by: INTERNAL MEDICINE

## 2020-09-09 PROCEDURE — 84100 ASSAY OF PHOSPHORUS: CPT

## 2020-09-09 PROCEDURE — 37000008 HC ANESTHESIA 1ST 15 MINUTES: Performed by: INTERNAL MEDICINE

## 2020-09-09 PROCEDURE — U0002 COVID-19 LAB TEST NON-CDC: HCPCS

## 2020-09-09 PROCEDURE — 36415 COLL VENOUS BLD VENIPUNCTURE: CPT

## 2020-09-09 PROCEDURE — 88305 TISSUE EXAM BY PATHOLOGIST: CPT | Performed by: PATHOLOGY

## 2020-09-09 PROCEDURE — D9220A PRA ANESTHESIA: Mod: ANES,,, | Performed by: ANESTHESIOLOGY

## 2020-09-09 PROCEDURE — 83735 ASSAY OF MAGNESIUM: CPT

## 2020-09-09 PROCEDURE — 94668 MNPJ CHEST WALL SBSQ: CPT

## 2020-09-09 PROCEDURE — 99233 SBSQ HOSP IP/OBS HIGH 50: CPT | Mod: ,,, | Performed by: HOSPITALIST

## 2020-09-09 PROCEDURE — 37000009 HC ANESTHESIA EA ADD 15 MINS: Performed by: INTERNAL MEDICINE

## 2020-09-09 PROCEDURE — 25000003 PHARM REV CODE 250: Performed by: INTERNAL MEDICINE

## 2020-09-09 PROCEDURE — 45331 SIGMOIDOSCOPY AND BIOPSY: CPT | Performed by: INTERNAL MEDICINE

## 2020-09-09 PROCEDURE — 88305 TISSUE EXAM BY PATHOLOGIST: CPT | Mod: 26,,, | Performed by: PATHOLOGY

## 2020-09-09 PROCEDURE — 80053 COMPREHEN METABOLIC PANEL: CPT

## 2020-09-09 PROCEDURE — 99233 SBSQ HOSP IP/OBS HIGH 50: CPT | Mod: ,,, | Performed by: NURSE PRACTITIONER

## 2020-09-09 PROCEDURE — 99233 PR SUBSEQUENT HOSPITAL CARE,LEVL III: ICD-10-PCS | Mod: ,,, | Performed by: HOSPITALIST

## 2020-09-09 PROCEDURE — D9220A PRA ANESTHESIA: ICD-10-PCS | Mod: ANES,,, | Performed by: ANESTHESIOLOGY

## 2020-09-09 RX ORDER — MAGNESIUM SULFATE HEPTAHYDRATE 40 MG/ML
2 INJECTION, SOLUTION INTRAVENOUS ONCE
Status: COMPLETED | OUTPATIENT
Start: 2020-09-09 | End: 2020-09-09

## 2020-09-09 RX ORDER — PROPOFOL 10 MG/ML
VIAL (ML) INTRAVENOUS CONTINUOUS PRN
Status: DISCONTINUED | OUTPATIENT
Start: 2020-09-09 | End: 2020-09-09

## 2020-09-09 RX ORDER — SODIUM CHLORIDE 9 MG/ML
INJECTION, SOLUTION INTRAVENOUS CONTINUOUS PRN
Status: DISCONTINUED | OUTPATIENT
Start: 2020-09-09 | End: 2020-09-09

## 2020-09-09 RX ORDER — LIDOCAINE HYDROCHLORIDE 20 MG/ML
INJECTION INTRAVENOUS
Status: DISCONTINUED | OUTPATIENT
Start: 2020-09-09 | End: 2020-09-09

## 2020-09-09 RX ORDER — PROPOFOL 10 MG/ML
VIAL (ML) INTRAVENOUS
Status: DISCONTINUED | OUTPATIENT
Start: 2020-09-09 | End: 2020-09-09

## 2020-09-09 RX ORDER — HEPARIN SODIUM 5000 [USP'U]/ML
5000 INJECTION, SOLUTION INTRAVENOUS; SUBCUTANEOUS EVERY 8 HOURS
Status: DISCONTINUED | OUTPATIENT
Start: 2020-09-09 | End: 2020-09-18 | Stop reason: HOSPADM

## 2020-09-09 RX ADMIN — POTASSIUM & SODIUM PHOSPHATES POWDER PACK 280-160-250 MG 2 PACKET: 280-160-250 PACK at 11:09

## 2020-09-09 RX ADMIN — LIDOCAINE HYDROCHLORIDE 50 MG: 20 INJECTION, SOLUTION INTRAVENOUS at 11:09

## 2020-09-09 RX ADMIN — GABAPENTIN 250 MG: 250 SOLUTION ORAL at 11:09

## 2020-09-09 RX ADMIN — ACETAMINOPHEN 650 MG: 325 TABLET ORAL at 11:09

## 2020-09-09 RX ADMIN — PHENOBARBITAL 100 MG: 20 ELIXIR ORAL at 11:09

## 2020-09-09 RX ADMIN — MAGNESIUM SULFATE IN WATER 2 G: 40 INJECTION, SOLUTION INTRAVENOUS at 09:09

## 2020-09-09 RX ADMIN — PROPOFOL 50 MG: 10 INJECTION, EMULSION INTRAVENOUS at 11:09

## 2020-09-09 RX ADMIN — PROPOFOL 100 MCG/KG/MIN: 10 INJECTION, EMULSION INTRAVENOUS at 11:09

## 2020-09-09 RX ADMIN — PROPRANOLOL HYDROCHLORIDE 20 MG: 20 TABLET ORAL at 09:09

## 2020-09-09 RX ADMIN — PIPERACILLIN SODIUM AND TAZOBACTAM SODIUM 4.5 G: 4; .5 INJECTION, POWDER, LYOPHILIZED, FOR SOLUTION INTRAVENOUS at 11:09

## 2020-09-09 RX ADMIN — PROPRANOLOL HYDROCHLORIDE 20 MG: 20 TABLET ORAL at 11:09

## 2020-09-09 RX ADMIN — PIPERACILLIN SODIUM AND TAZOBACTAM SODIUM 4.5 G: 4; .5 INJECTION, POWDER, LYOPHILIZED, FOR SOLUTION INTRAVENOUS at 05:09

## 2020-09-09 RX ADMIN — DIPHENHYDRAMINE HYDROCHLORIDE 12.5 MG: 25 SOLUTION ORAL at 11:09

## 2020-09-09 RX ADMIN — PROPOFOL 30 MG: 10 INJECTION, EMULSION INTRAVENOUS at 11:09

## 2020-09-09 RX ADMIN — LOPERAMIDE HYDROCHLORIDE 2 MG: 2 CAPSULE ORAL at 11:09

## 2020-09-09 RX ADMIN — SODIUM PHOSPHATE, DIBASIC AND SODIUM PHOSPHATE, MONOBASIC 1 ENEMA: 7; 19 ENEMA RECTAL at 09:09

## 2020-09-09 RX ADMIN — BACLOFEN 10 MG: 10 TABLET ORAL at 11:09

## 2020-09-09 RX ADMIN — POTASSIUM & SODIUM PHOSPHATES POWDER PACK 280-160-250 MG 2 PACKET: 280-160-250 PACK at 06:09

## 2020-09-09 RX ADMIN — PIPERACILLIN SODIUM AND TAZOBACTAM SODIUM 4.5 G: 4; .5 INJECTION, POWDER, LYOPHILIZED, FOR SOLUTION INTRAVENOUS at 09:09

## 2020-09-09 RX ADMIN — POTASSIUM & SODIUM PHOSPHATES POWDER PACK 280-160-250 MG 2 PACKET: 280-160-250 PACK at 05:09

## 2020-09-09 RX ADMIN — PROPRANOLOL HYDROCHLORIDE 20 MG: 20 TABLET ORAL at 05:09

## 2020-09-09 RX ADMIN — BACLOFEN 10 MG: 10 TABLET ORAL at 09:09

## 2020-09-09 RX ADMIN — HEPARIN SODIUM 5000 UNITS: 5000 INJECTION INTRAVENOUS; SUBCUTANEOUS at 11:09

## 2020-09-09 RX ADMIN — BACLOFEN 10 MG: 10 TABLET ORAL at 05:09

## 2020-09-09 RX ADMIN — PANTOPRAZOLE SODIUM 40 MG: 40 GRANULE, DELAYED RELEASE ORAL at 09:09

## 2020-09-09 RX ADMIN — SODIUM CHLORIDE: 0.9 INJECTION, SOLUTION INTRAVENOUS at 11:09

## 2020-09-09 RX ADMIN — PIPERACILLIN SODIUM AND TAZOBACTAM SODIUM 4.5 G: 4; .5 INJECTION, POWDER, LYOPHILIZED, FOR SOLUTION INTRAVENOUS at 12:09

## 2020-09-09 NOTE — PLAN OF CARE
Problem: Malnutrition  Goal: Improved Nutritional Intake  Outcome: Ongoing, Progressing   Recommendations    Recommendation:   1. Resume TF of Peptamen 1.5 with prebio @ 20 mL/hr increase to goal rate of 40 mL/hr to provide pt with 1440 kcal, 65 g protein and 739 mL free water.    -Additional water per MD. Hold for residuals >500 mL.   2. Continue lactobacillus rhamnosus 2/2 diarrhea   3. Add Vitamin C, zinc and MVI to aid in wound healing   RD to monitor and follow up    Goals: pt to receive nutrition by RD follow up  Nutrition Goal Status: goal met  Communication of RD Recs: (POC)

## 2020-09-09 NOTE — [employer]ATION PATIENT INSTRUCTIONS
Discharge Summary/Instructions after an Endoscopic Procedure  Patient Name: Glen Moscoso  Patient MRN: [id number]  Patient YOB: 1995  Wednesday, September 9, 2020  America Goode MD  RESTRICTIONS:  During your procedure today, you received medications for sedation.  These   medications may affect your judgment, balance and coordination.  Therefore,   for 24 hours, you have the following restrictions:   - DO NOT drive a car, operate machinery, make legal/financial decisions,   sign important papers or drink alcohol.    ACTIVITY:  Today: no heavy lifting, straining or running due to procedural   sedation/anesthesia.  The following day: return to full activity including work.  DIET:  Eat and drink normally unless instructed otherwise.     TREATMENT FOR COMMON SIDE EFFECTS:  - Mild abdominal pain, nausea, belching, bloating or excessive gas:  rest,   eat lightly and use a heating pad.  - Sore Throat: treat with throat lozenges and/or gargle with warm salt   water.  - Because air was used during the procedure, expelling large amounts of air   from your rectum or belching is normal.  - If a bowel prep was taken, you may not have a bowel movement for 1-3 days.    This is normal.  SYMPTOMS TO WATCH FOR AND REPORT TO YOUR PHYSICIAN:  1. Abdominal pain or bloating, other than gas cramps.  2. Chest pain.  3. Back pain.  4. Signs of infection such as: chills or fever occurring within 24 hours   after the procedure.  5. Rectal bleeding, which would show as bright red, maroon, or black stools.   (A tablespoon of blood from the rectum is not serious, especially if   hemorrhoids are present.)  6. Vomiting.  7. Weakness or dizziness.  GO DIRECTLY TO THE NEAREST EMERGENCY ROOM IF YOU HAVE ANY OF THE FOLLOWING:      Difficulty breathing              Chills and/or fever over 101 F   Persistent vomiting and/or vomiting blood   Severe abdominal pain   Severe chest pain   Black, tarry stools   Bleeding- more than one  tablespoon   Any other symptom or condition that you feel may need urgent attention  Your doctor recommends these additional instructions:  If any biopsies were taken, your doctors clinic will contact you in 1 to 2   weeks with any results.  - Return patient to hospital prater for ongoing care.   - Advance diet as tolerated.   - Await pathology results.   - The findings and recommendations were discussed with the patient's   family.  For questions, problems or results please call your physician - America Goode MD at Work:  [phone].  [facility], EMERGENCY ROOM PHONE NUMBER: [phone]  IF A COMPLICATION OR EMERGENCY SITUATION ARISES AND YOU ARE UNABLE TO REACH   YOUR PHYSICIAN - GO DIRECTLY TO THE EMERGENCY ROOM.  America Goode MD  9/9/2020 11:51:23 AM  This report has been verified and signed electronically.  [employer]

## 2020-09-09 NOTE — INTERVAL H&P NOTE
The patient has been examined and the H&P has been reviewed:    I concur with the findings and no changes have occurred since H&P was written.    Anesthesia risks, benefits and alternative options discussed and understood by patient/family.    Pre-Procedure H and P Addendum    Patient seen and examined.  History and exam unchanged from prior history and physical.      Procedure: flex sig  Indication: diarrhea/rectal thickening on CT  ASA Class: per anesthesiology  Airway: per anesthesia  Neck Mobility: full range of motion  Mallampatti score: per anesthesia  History of anesthesia problems: no  Family history of anesthesia problems: no  Anesthesia Plan: per anesthesia        Active Hospital Problems    Diagnosis  POA    *Acute respiratory failure with hypoxia [J96.01]  Yes    Fever [R50.9]  No    Status epilepticus [G40.901]  Yes    Biphasic stridor [R06.1]  No    Bacterial infection due to Serratia [A49.8]  Yes    Anemia of chronic disease [D63.8]  Yes    Hypernatremia [E87.0]  No    Hypophosphatemia [E83.39]  Yes    Severe protein-calorie malnutrition [E43]  Yes    Seizure disorder [G40.909]  Yes    Encephalopathy acute [G93.40]  Yes    Deep tissue injury [T14.8XXA]  Yes    Colitis [K52.9]  Yes    Decubitus ulcer of left buttock, stage 2 [L89.322]  Yes    Cerebral palsy [G80.9]  Yes    Pneumonia due to Pseudomonas aerginuosa and Serratia marcesens [J15.1]  Yes    Hypokalemia [E87.6]  Yes      Resolved Hospital Problems    Diagnosis Date Resolved POA    Pneumonia [J18.9] 08/30/2020 Yes    Endotracheally intubated [Z97.8] 08/27/2020 Unknown    Respiratory distress [R06.03] 08/27/2020 Unknown    Status epilepticus [G40.901] 08/30/2020 Yes

## 2020-09-09 NOTE — PLAN OF CARE
Problem: Adult Inpatient Plan of Care  Goal: Plan of Care Review  Outcome: Ongoing, Not Progressing  Goal: Patient-Specific Goal (Individualization)  Description: Admit Date: 8/26    Admit Dx: Seizures    Past Medical History:  No date: Acid reflux  No date: Cerebral palsy  No date: History of hip surgery  No date: S/P percutaneous endoscopic gastrostomy (PEG) tube placement  No date: Seizures    History reviewed. No pertinent surgical history.    Individualization:   1. Pt likes legs bent and arms elevated    Restraints: N/A         Outcome: Ongoing, Not Progressing  Goal: Absence of Hospital-Acquired Illness or Injury  Outcome: Ongoing, Not Progressing  Goal: Optimal Comfort and Wellbeing  Outcome: Ongoing, Not Progressing  Goal: Readiness for Transition of Care  Outcome: Ongoing, Not Progressing  Goal: Rounds/Family Conference  Outcome: Ongoing, Not Progressing     Problem: Communication Impairment (Mechanical Ventilation, Invasive)  Goal: Effective Communication  Outcome: Ongoing, Not Progressing     Problem: Device-Related Complication Risk (Mechanical Ventilation, Invasive)  Goal: Optimal Device Function  Outcome: Ongoing, Not Progressing

## 2020-09-09 NOTE — NURSING TRANSFER
Nursing Transfer Note      9/9/2020     Transfer To: 948    Transfer via stretcher    Transfer with cardiac monitoring via central tele    Transported by pct    Medicines sent: none    Chart send with patient: Yes    Notified: mother    Patient reassessed at: 9/9/2020 0747

## 2020-09-09 NOTE — PROGRESS NOTES
Progress Note   Hospital Medicine         Patient Name: Glen Moscoso  MRN:  [id number]  [facility]: [facility] K Quiana Armenta MD  Date of Admission:  8/26/2020     Length of Stay:  LOS: 14 days   Expected Discharge Date: 9/10/2020  Principal Problem:  Acute respiratory failure with hypoxia       Subjective:     Interval History/Overnight Events:    Flex sig today with biopsies taken, Hg overcorrected at 10 from 6.9 so unclear where will land tomorrow with that, no signs of bleeding on flex/sig. U/S to assess area of pain on exam with ID (he did wince on my exam as well and lipase was neg earlier in week in RUQ) was negative. ID recs another week of zosyn givn persistant serratia in sputum and the wound ultimately debrided by gen surg on sacrum.   Will likely need a midline as other medical issues are stabilizing now. Resumed tube feeds now, went to room x 2 to see him, hes at [facility] now. Mom not at bedside, she is usually here so likely is downstairs getting food or sometimes goes to smoke briefly. Will call to update.           Review of Systems   Unable to assess secondary to baseline cognitive status.  All other systems reviewed and are negative.    Objective:     Temp:  [97.9 °F (36.6 °C)-99.6 °F (37.6 °C)]   Pulse:  []   Resp:  [15-20]   BP: (114-149)/(59-83)   SpO2:  [92 %-100 %]       Physical Exam:  Constitutional: Appears underweight, chronically ill baseline. Nonverbal. Contractures.  Head: Normocephalic and atraumatic.   Mouth/Throat: Oropharynx is clear and moist.   Eyes: EOM are normal. Pupils are equal, round, and reactive to light. No scleral icterus.   Neck: Normal range of motion. Neck supple. some contracture in neck.  Cardiovascular: Normal rate and regular rhythm.  No murmur heard.  Pulmonary/Chest: Effort normal and breath sounds normal. Coarse breath sounds. On room air.   Abdominal: Soft. Bowel sounds are normal.  No distension or tenderness. G tube.  Musculoskeletal:  muscle wasting. Contractures of both legs. No edema.  Neurological: not alert to place or person, baseline status. No gross abnormalities on minimal neuro exam- moving all 4 extremities. Cannot assess cranial nerves or sensation. bedbound baseline.  Skin: Skin is warm and dry. sacral ulcerations with full thickness breakdown seen with black nonviable tissue (See wound care pictures). Keloid type changes on bilatearl hips. Peripheral IV in left arm.  Psychiatric: Normal mood and affect. Behavior is normal.     Recent Labs   Lab 09/04/20  0508 09/05/20  0413 09/06/20  0550 09/07/20  0713 09/08/20  0558 09/09/20  0650   WBC 9.51 7.66 7.79 7.47 6.10 10.47   HGB 7.2* 7.5* 8.2* 7.8* 6.9* 10.8*   HCT 22.7* 24.7* 27.4* 25.9* 23.3* 35.8*   * 569* 578* 636* 668* 893*     Recent Labs   Lab 09/07/20  0713 09/08/20  0558 09/09/20  0650    137 140   K 4.1 3.6 4.1    105 105   CO2 24 26 24   BUN 7 8 8   CREATININE 0.5 0.6 0.6   GLU 89 120* 79   CALCIUM 8.1* 8.1* 9.0   MG 1.9 1.8 1.7   PHOS 3.6 3.8 3.7     Recent Labs   Lab 09/07/20  0713 09/08/20  0558 09/09/20  0650   ALKPHOS 145* 159* 175*   ALT 19 20 24   AST 23 23 24   ALBUMIN 1.7* 1.8* 2.3*   PROT 6.4 6.3 7.8   BILITOT 0.2 0.1 0.4     Recent Labs   Lab 09/04/20  1838 09/05/20  1646 09/06/20  1640 09/08/20  1111 09/08/20  1705   POCTGLUCOSE 81 104 92 95 80        baclofen  10 mg Per G Tube TID    diphenhydrAMINE  12.5 mg Per G Tube QHS    gabapentin  250 mg Per G Tube QHS    Lactobacillus rhamnosus GG  1 capsule Per G Tube Daily    pantoprazole  40 mg Per G Tube Daily    PHENobarbitaL  100 mg Per G Tube QHS    piperacillin-tazobactam (ZOSYN) IVPB  4.5 g Intravenous Q8H    potassium, sodium phosphates  2 packet Per G Tube QID (AC & HS)    propranoloL  20 mg Per G Tube TID    sodium phosphates  1 enema Rectal Once       Assessment and Plan     Mr. Glen Moscoso is a 24 y.o. male who presented to [facility] on 8/26/2020 with     Acute respiratory failure  with hypoxia  Serratia/Pseudomonas Pneumonia--> with reoccurrent Resistant pneumonia     · Patient intubated upon transfer from outside facility and extubated 8/27  ·  Patient found to have pneumonia and felt cause of respiratory failure, treated with course of cipro, stepped back up to ICU 8/31 for worse resp fx and stridor, improved with chin thrust manuevers but still required extra stay, requires frequent suctioning for this  · Sputum Cx growing serratia again on 8/31 while on treatment with cipro, still febrile every day to 101 multiple times, sputum less per mom and CXR improved but still has episodes likely aspiration with increased HR, and fever, on RA with sats 92-98% now. Sensitivities returned on 9/3 with Cipro resistant now, zosyn started, ID following, recommend to continue, chest CT with R GOO likely the pna, no clots. Has been on RA since SD, afebrile since zosyn. [place] 1 week zosyn per ID  · Recheck sputum 9/6 for fever, with same serratia and sensitiivty. Per ID rec  2 weeks antibiotics now (9/17). Will need midline consult tomorrow given new recs and other med issues now stbailizing.               Biphasic stridor  · Patient with acute development of stridor after extubation of 8/27 but acutely worsened on 8/30.   · Likely mechanical due to neck muscle weakness related to his advance cerebral palsy as improved when patient was sat up and chin was pulled up) and recent acute illnesses but also likely a degree of airway inflammation as contributing to upper air obstruction.   · Patient should be at 90 degree angle at all times to help with breathing mechanics and will monitor.  · Stepped back to ICU 8/31 for this, requires frequent suctioning, jaw thrust improves this, mom reports improving now on step down 9/3, continue nebs and supportive care  ·      Pneumonia due to Pseudomonas aerginuosa and Serratia marcesens  · Patient admitted with acute hypoxic respiratory failure and sepsis. Blood and  urine cultures negative and C. Diff negative but respiratory culture grew Serratia and Pseudomonas. Procal improved since initiation of antibiotics and WBC normalized and sepsis resolved (procal 4--. 3)  -see above for acute resp failure, ID consulted 9/3 for 8/31 serratia with FQ resistance now, Zosyn started. Continue now.   -1 week at least per ID initially but given persistance now rec 2 weeks (9/17. Serratia still in sputum 9/6.        Encephalopathy acute  · Improved, at baseline on step down 9/3 per mom, non verbal at baseline but interacts with his mom somewhat  · Patient with decreased mentation from baseline on admit. Patient transferred to [facility] for further evaluation. EEG done and showed no status epilepticus and encephalopathy felt related to sepsis/infection.     Decubitus ulcer of left buttock, stage 2  Deep tissue injury  Wounds present on admit an wound care- mom reports had very bad stool issues a month ago causing these worsening wounds prior to admit  -wound care recs triad cream to bilatearl ischium, scrotum, r wrist pain daily with betadine  -nonviable tissue to sacrum (See pictures)  -gen surg consultd 9/3 for debridmenet, consulted, feel tissue does not need debridement now on exam but likely were looking at area that wound care was not concerned about as wound care called 9/4 and stated this, will talk to them again after CT results below.  -CT abdomen/pelvis not showing bone involved, debrided with gen surg 9/7, right side with underlying abscess cavity tracking inferior and laterally about 2 cm, packed with wet to dry gauze dressings, BID drsesing changes needed  Continue to Turn every 2 hrs. Pad bony prominences with pillows or foam dressings.  -zosyn will cover the abscess cavity per ID as no CX taken         Seizure disorder  Chronic and controlled. EEG done on this admit showed no status epilepticus.   -Continue Phenobarbital 100 mg nightly via G tube to treat as patient takes  at home         Anemia of chronic disease  Acute blood lsos anemia  Chronic and controlled. Monitor with daily CBC and transfuse if Hgb < 7.   -Hg between 7-8 now but has has had slow downtick to lower 7s now. Close to transfusion threshold on 9/4, may require tomorrow. No signs of active bleeding.  -stable high 7's low 8's  9/8: dipped again to 6.7 from 7.8 the last few days with FOBT + in setting of imaging for colitis. 1 U PRBCS. Hold heparin, protonix PO as not suspected upper GI bleed.  9/9: now up at 10, overcorrected, so may expect downtick tomorrow again      Hypokalemia  Hypophosphatemia  -replace PRN          Hypernatremia  · Patient developed hypernatremia in ICU related to normal saline infusion and free water depletion. Normal saline infusion stopped on am of 8/29 when sodium got up to 150.  · Had issuse with lower Na with free water flushes so stopped in ICU on step up 8/31, currently normal Na on TF without extra free water   · -no issues on SD again currently             Severe protein-calorie malnutrition  · Patient with obvious muscle wasting and BMI of 15.8. Nutrition consulted on admit.   · Patient started on TF with Peptamen 1.5 with prebio at 40 cc/hr.         Cerebral palsy  Patient with poor functional baseline and bed bound with extremity contractures and total care at home.    Line associated UE clot  -line removed after Left basilic thrombus found in fever work up. RIght cephalic not well visualized, cannot rule out  -as line associated can consider anticoag however is superficial vein, so will do ppx only now  -resume heparin 9/9 held for GI procedures with bx    Diarrhea  Protocolitis  -C dff negative  -will add immodium to see if improves this  -tube feeds could be contributor but have changed since home and diarrhea has persisted since then  -seen in CT early august and again 9/4  -GI consulted- stool studies pending, will follow, plan for flex sig 9/8 vs 9/9 to assess.  9/9: flex sig,  no gross abnormalities, biopsies taken. Stool studies still pending now.    Sleep Disturbance  -resumed home bendadryl and neurontin qhs          Diet:  Tube feeds resumed.     DVT PPx:  TEDS/SCDS,resume heparin             Disposition:  Will need midline for antibiotics as medical issues improving now and likely will be able to be at home on zosyn before iv antibiotics complete.    Earliest dc possible later in the week vs over weekend with HH    Ultimate plan is HH with mom with DME and close follow ups  Mom # if not in room for MD exams she is in [facility], stays 24/7, call her cell at [phone] and she will return immediatley    Discharge Planning   ROCÍO: 9/10/2020     Code Status: Full Code   Is the patient medically ready for discharge?: No    Reason for patient still in hospital (select all that apply): Patient unstable  Discharge Plan A: Home with family, Home Health   Discharge Delays: None known at this time

## 2020-09-09 NOTE — PROGRESS NOTES
"[facility]  Adult Nutrition  Progress Note    SUMMARY       Recommendations    Recommendation:   1. Resume TF of Peptamen 1.5 with prebio @ 20 mL/hr increase to goal rate of 40 mL/hr to provide pt with 1440 kcal, 65 g protein and 739 mL free water.    -Additional water per MD. Hold for residuals >500 mL.   2. Continue lactobacillus rhamnosus 2/2 diarrhea   3. Add Vitamin C, zinc and MVI to aid in wound healing   RD to monitor and follow up    Goals: pt to receive nutrition by RD follow up  Nutrition Goal Status: goal met  Communication of RD Recs: (POC)    Reason for Assessment    Reason For Assessment: RD follow-up  Diagnosis: (Encephalopathy acute)  Relevant Medical History: Cerebral palsy; Seizures; PEG; Acid reflux  Interdisciplinary Rounds: did not attend  General Information Comments: Pt s/p procedure today flex sig. TF was held for procedure and to be restarted today per RN, pt remains NPO. Pt previously tolerating TF well at goal rate of 40 mL/hr. Wounds noted. Wt stable since admit. NFPE completed on , pt meets criteria for moderate malnutrition at this time.  Nutrition Discharge Planning: adequate intake via TFs    Nutrition Risk Screen    Nutrition Risk Screen: tube feeding or parenteral nutrition    Nutrition/Diet History    Spiritual, Cultural Beliefs, Mosque Practices, Values that Affect Care: no  Food Allergies: NKFA  Factors Affecting Nutritional Intake: NPO  Nutrition Support Formula Prior to Admit: Other (see comments)(Ensure 4 cans/day)    Anthropometrics    Temp: 98.9 °F (37.2 °C)  Height Method: Stated  Height: 4' 9" (144.8 cm)  Height (inches): 57 in  Weight Method: Bed Scale  Weight: 33.1 kg (73 lb)  Weight (lb): 73 lb  Ideal Body Weight (IBW), Male: 88 lb  % Ideal Body Weight, Male (lb): 82.95 %  BMI (Calculated): 15.8  BMI Grade: less than 16 protein-energy malnutrition grade III  Usual Body Weight (UBW), k.64 kg  % Usual Body Weight: " 85.87    Lab/Procedures/Meds    Pertinent Labs Reviewed: reviewed  Pertinent Labs Comments: noted  Pertinent Medications Reviewed: reviewed  Pertinent Medications Comments: gabapentin; pantoprazole; propranolol; lactobacillus rhamnosus     Estimated/Assessed Needs    Weight Used For Calorie Calculations: 33.1 kg (72 lb 15.6 oz)  Energy Calorie Requirements (kcal): 1412 kcal/day  Energy Need Method: [place]-St Jeor(x 1.25)  Protein Requirements: 53-66 gm/day  Weight Used For Protein Calculations: 33.1 kg (72 lb 15.6 oz)  Fluid Requirements (mL): 1 mL/kcal or per MD  RDA Method (mL): 1412    Nutrition Prescription Ordered    Current Diet Order: NPO  Current Nutrition Support Formula Ordered: Peptamen 1.5 w/Prebio  Current Nutrition Support Rate Ordered: TF clamped at this time     Evaluation of Received Nutrient/Fluid Intake    I/O: +16.5 L since admit  Energy Calories Required: not meeting needs  Protein Required: not meeting needs  Fluid Required: other (see comments)  Comments: LBM 9/7  Tolerance: tolerating  % Intake of Estimated Energy Needs: 0 - 25 %  % Meal Intake: NPO    Nutrition Risk    Level of Risk/Frequency of Follow-up: high     Assessment and Plan  Nutrition Problem:  Moderate Protein-Calorie Malnutrition  Malnutrition in the context of Acute Illness/Injury     Related to (etiology):  Increased nutrient needs     Signs and Symptoms (as evidenced by):  Energy Intake: <75% of estimated energy requirement for >2 weeks  Body Fat Depletion: moderate depletion of orbitals and triceps   Muscle Mass Depletion: moderate and severe depletion of temples, clavicle region, interosseous muscle and lower extremities   Weight Loss: 14% x >2 weeks      Interventions (treatment strategy):  Collaboration of care with other providers  Enteral nutrition     Nutrition Diagnosis Status:  Continues     Monitor and Evaluation    Food and Nutrient Intake: energy intake, enteral nutrition intake  Food and Nutrient Adminstration:  enteral and parenteral nutrition administration  Anthropometric Measurements: weight, weight change  Biochemical Data, Medical Tests and Procedures: electrolyte and renal panel, gastrointestinal profile, glucose/endocrine profile, inflammatory profile, lipid profile  Nutrition-Focused Physical Findings: overall appearance     Malnutrition Assessment  Malnutrition Type: acute illness or injury  Weight Loss (Malnutrition): (14 % x >2 weeks)  Subcutaneous Fat (Malnutrition): moderate depletion  Muscle Mass (Malnutrition): moderate depletion   Orbital Region (Subcutaneous Fat Loss): moderate depletion  Upper Arm Region (Subcutaneous Fat Loss): moderate depletion   [place] Region (Muscle Loss): severe depletion  Clavicle Bone Region (Muscle Loss): severe depletion  Clavicle and Acromion Bone Region (Muscle Loss): severe depletion  Dorsal Hand (Muscle Loss): moderate depletion  Patellar Region (Muscle Loss): moderate depletion  Anterior Thigh Region (Muscle Loss): severe depletion  Posterior Calf Region (Muscle Loss): severe depletion   Edema (Fluid Accumulation): 0-->no edema present     Nutrition Follow-Up    RD Follow-up?: Yes

## 2020-09-09 NOTE — PROGRESS NOTES
[facility]  Infectious Disease  Progress Note    Patient Name: Glen Moscoso  MRN: [id number]  Admission Date: 8/26/2020  Length of Stay: 13 days  Attending Physician: Quiana Armenta MD  Primary Care Provider: Yadi Lawson MD    Isolation Status: No active isolations  Assessment/Plan:      Fever     24 year old male with history of cerebral palsy and seizures who presents with encephalopathy, diarrhea and respiratory distress initially requiring intubation. EEG negative for seizures. CXR clear. WBC 22 K on admit.     Blood/urine cx negative. Resp cx grew Pseudomonas and Serratia. He was started on broad spectrum abx and IVF on admit with improvement in mentation and extubated 8/27. ABx deescalated to Cipro on 8/29. Since then he has been having fevers.    He was found to have left basilic vein thrombosis around midline and it was removed. Also with bilateral necrotic ischial ulcerations with fluid collection within the lateral aspect of the right lower extremity at the level of the hip.    Repeat blood cx are sterile. Repeat resp cx with Serratia (now quinolone R). Still with diarrhea. Was febrile, hypoxic and tachycardic. ID consulted for abx recs.    Patient started on zosyn, on RA with O2 Sat WNL. On day 6 of Zosyn - repeat induced sputum culture appears contaminated and still growing serratia but o2 Sat WNL on RA - colonization suspected    Gen Sx debrided ischial ulcers at bedside and abscess found under R ischial eschar - no culture done - per dw them no bone exposed and recent CT abd had no mention of osteomyelitis findings  C Diff neg  GI now following for diarrhea and planning on flex sig eval - stool studies sent  Stable - afebrile and WBC WNL  ABD tender with indurated pulsatile mass in RM and RU quadrant.    Plan  - Continue IV Zosyn min 7 days - consider 14d total given ischial abscess  - rec US of ABD dhara RM and RU quadrant to eval mass.  - aggressive wound care to ischial  wounds - previously no sign of cellulitis - rec wound care consulted.  - fu GI results and stool studies  - ID will follow      Decubitus ulcer of left buttock, stage 2     See above    Pneumonia due to Pseudomonas aerginuosa and Serratia marcesens     See above      Anticipated Disposition: tbd    Thank you for your consult. I will follow-up with patient. Please contact us if you have any additional questions.    JO Shin  Infectious Disease  [facility]    Subjective:     Principal Problem:Acute respiratory failure with hypoxia    HPI: Glen Moscoso is a 24 year old male with history of cerebral palsy, seizures, and recent admission for hyponatremia who presents with encephalopathy, respiratory distress and possible seizure. He presented to OSH after being found unresponsive and in resp distress. He was found down by his mother upon EMS arrival he had agonal resp 3-4 per min, he was intubated by EMS and take to OSH ED. He was hypotensive on arrival started on Levo gtt, ABG showed metabolic acidosis. WBC 22K. CXR clear. He was admitted to [facility] on 8/26 for a higher level of care, EGG and close monitoring. Started on empiric Vanc and Zosyn and IVF.     On 8/27 he was extubated. Tube feeds, chest physiotherapy were started. Blood cx and urine cx returned negative. Resp cx grew Serratia and Pseudomonas. C diff testing negative.  EEG did not show any epileptiform activity or discharge. He improved with fluids and abx and returned back to baseline per his mother. Suspect AMS due to dehydration and infection. Leukocytosis resolved. ABx deescalated to Cipro on 8/29. Since then he has been having fevers. He was found to have Left basilic vein thrombosis around midline and it was removed. He completed Cipro on 9/1. Stepped down to the floor yesterday. Given ongoing fevers blood cx repeated and are negative. Sputum cx 8/31 is showing Serratia again. Zosyn started. ID consulted for abx recs. He is  on RA. O2 sats in the low 90s. He has multiple necrotic decubitus ulcerations. General surgery consulted. Wound care following.    Of note, he was recently admitted 8/4 - 8/18 for fever, diarrhea and cough. Per brief chart review,  No clear cause of fevers but in the prior two months he has been innudated with antibiotics. Cultures returned negative. Tx with Vanc and Levofloxacin for PNA. Also treated empirically for C diff with oral flagyl and vanc. Symptoms improved prior to discharge. Fever and leukocytosis resolved.  Interval History: No AEON.  Afebrile and WBC WNL.  Gen sx debrided wounds at bedside and found abscess under right ischial eschar.  BCXs NGTD.      Review of Systems   Unable to perform ROS: Patient nonverbal     Objective:     Vital Signs (Most Recent):  Temp: 99.6 °F (37.6 °C) (09/08/20 1948)  Pulse: 103 (09/08/20 1958)  Resp: 15 (09/08/20 1958)  BP: (!) 115/59 (09/08/20 1948)  SpO2: 96 % (09/08/20 1958) Vital Signs (24h Range):  Temp:  [97.9 °F (36.6 °C)-99.6 °F (37.6 °C)] 99.6 °F (37.6 °C)  Pulse:  [] 103  Resp:  [15-20] 15  SpO2:  [93 %-98 %] 96 %  BP: (105-123)/(55-75) 115/59     Weight: 33.1 kg (73 lb)  Body mass index is 15.8 kg/m².    Estimated Creatinine Clearance: 90.4 mL/min (based on SCr of 0.6 mg/dL).    Physical Exam  Vitals signs and nursing note reviewed.   Constitutional:       General: He is not in acute distress.     Appearance: He is not diaphoretic.       HENT:      Head: Atraumatic.      Nose: Nose normal.      Mouth/Throat:      Mouth: Mucous membranes are dry.      Comments: Dry cracked lips  Eyes:      General: No scleral icterus.     Conjunctiva/sclera: Conjunctivae normal.   Cardiovascular:      Rate and Rhythm: Regular rhythm. Tachycardia present.      Heart sounds: Normal heart sounds.   Pulmonary:      Effort: Pulmonary effort is normal. No respiratory distress.      Comments: Course breath sounds bilaterally  RA  Abdominal:      General: There is no distension.       Palpations: Abdomen is soft.   Musculoskeletal:      Right lower leg: No edema.      Left lower leg: No edema.   Skin:     Findings: Lesion present. No rash.      Comments: Necrotic ischial decubitus ulcerations   Neurological:      Mental Status: He is alert.       9/4                Significant Labs:   Blood Culture:   Recent Labs   Lab 08/08/20  1112 08/26/20  0305 08/26/20  0318 09/01/20  0043 09/01/20  0057   LABBLOO No growth after 5 days.  No growth after 5 days. No growth after 5 days. No growth after 5 days. No growth after 5 days. No growth after 5 days.     CBC:   Recent Labs   Lab 09/07/20  0713 09/08/20  0558   WBC 7.47 6.10   HGB 7.8* 6.9*   HCT 25.9* 23.3*   * 668*     CMP:   Recent Labs   Lab 09/07/20  0713 09/08/20  0558    137   K 4.1 3.6    105   CO2 24 26   GLU 89 120*   BUN 7 8   CREATININE 0.5 0.6   CALCIUM 8.1* 8.1*   PROT 6.4 6.3   ALBUMIN 1.7* 1.8*   BILITOT 0.2 0.1   ALKPHOS 145* 159*   AST 23 23   ALT 19 20   ANIONGAP 8 6*   EGFRNONAA >60.0 >60.0     Respiratory Culture:   Recent Labs   Lab 08/26/20  0505 08/31/20  2157 09/06/20  1103   GSRESP <10 epithelial cells per low power field.  Moderate WBC's  Many Gram negative rods  Many Gram negative diplococci <10 epithelial cells per low power field.  Rare WBC's  Rare Gram negative rods >10 epithelial cells per low power field  Rare WBC's  Rare Gram negative rods   RESPIRATORYC No S aureus isolated.  SERRATIA MARCESCENS  Moderate  Normal respiratory jose also present  *  PSEUDOMONAS AERUGINOSA  Moderate  * No S aureus or Pseudomonas isolated.  SERRATIA MARCESCENS  Moderate  * No S aureus or Pseudomonas isolated.  SERRATIA MARCESCENS  Moderate  *     Urine Culture:   Recent Labs   Lab 08/26/20  0915   LABURIN No significant growth     Urine Studies:   Recent Labs   Lab 08/09/20  0555 08/26/20  0915   COLORU Yellow Vane   APPEARANCEUA Hazy* Cloudy*   PHUR 6.0 5.0   SPECGRAV >=1.030* 1.025   PROTEINUA 1+* 1+*    GLUCUA Negative Negative   KETONESU Negative Negative   BILIRUBINUA Negative Negative   OCCULTUA 3+* 2+*   NITRITE Negative Negative   UROBILINOGEN Negative  --    LEUKOCYTESUR Negative Trace*   RBCUA 40* 13*   WBCUA 8* 18*   BACTERIA Occasional Occasional   SQUAMEPITHEL  --  1   HYALINECASTS 2* 7*     Wound Culture: No results for input(s): LABAERO in the last 4320 hours.  All pertinent labs within the past 24 hours have been reviewed.    Significant Imaging: I have reviewed all pertinent imaging results/findings within the past 24 hours.   US Lower Extremity Veins Bilateral [[id number]] Resulted: 09/01/20 1204   Order Status: Completed Updated: 09/01/20 1206   Narrative:     EXAMINATION:   US LOWER EXTREMITY VEINS BILATERAL     CLINICAL HISTORY:   r/o DVTs;     TECHNIQUE:   Duplex and color flow Doppler and dynamic compression was performed of the bilateral lower extremity veins was performed.     COMPARISON:   None     FINDINGS:   Right thigh veins: The common femoral, femoral, popliteal, upper greater saphenous, and deep femoral veins are patent and free of thrombus. The veins are normally compressible and have normal phasic flow and augmentation response.     Right calf veins: The visualized calf veins are patent.     Left thigh veins: The common femoral, femoral, popliteal, upper greater saphenous, and deep femoral veins are patent and free of thrombus. The veins are normally compressible and have normal phasic flow and augmentation response.     Left calf veins: The visualized calf veins are patent.     There is a heterogeneous collection within the right lateral leg measuring 3.8 x 1.7 x 3.0 cm.  No drainable collection.    Impression:       No evidence of deep venous thrombosis in either lower extremity.     Heterogeneous collection within the lateral aspect of the right lower extremity at the level of the hip, which may represent a small hematoma.  No focal drainable fluid collection.     Electronically  signed by resident: Erika Baxter   Date: 09/01/2020   Time: 11:57     Electronically signed by: Reji Ray MD   Date: 09/01/2020   Time: 12:04   US Upper Extremity Veins Right [[id number]] (Abnormal) Resulted: 09/01/20 1202   Order Status: Completed Updated: 09/01/20 1205   Narrative:     EXAMINATION:   US UPPER EXTREMITY VEINS LEFT; US UPPER EXTREMITY VEINS RIGHT     CLINICAL HISTORY:   r/o dvts;; ? dvt;     TECHNIQUE:   Duplex and color flow Doppler evaluation and dynamic compression was performed of the right and left upper extremity veins.     COMPARISON:   None     FINDINGS:   Right central veins: The internal jugular, subclavian, and axillary veins are patent and free of thrombus.     Right arm veins: The brachial, and basilic veins are patent and compressible.  The cephalic vein is not visualized and occlusion is not entirely excluded.     Left central veins: The internal jugular, subclavian, and axillary veins are patent and free of thrombus.     Left arm veins: The brachial and cephalic veins are patent and compressible, noting that there is partial obscuration by overlying bandage.     There is a peripheral venous catheter within the left basilic vein.  There is surrounding occlusive thrombus and expansion of the vein.     Miscellaneous: N/A    Impression:       Thrombosis of the left basilic vein surrounding a peripheral venous catheter with expansion of the vein.     The right cephalic vein is not visualized and occlusion is not entirely excluded.     No other thrombus is visualized.     This report was flagged in Epic as abnormal.     Electronically signed by resident: Erika Baxter   Date: 09/01/2020   Time: 11:49     Electronically signed by: Reji Ray MD   Date: 09/01/2020   Time: 12:02   US Upper Extremity Veins Left [[id number]] (Abnormal) Resulted: 09/01/20 1202   Order Status: Completed Updated: 09/01/20 1205   Narrative:     EXAMINATION:   US UPPER EXTREMITY VEINS LEFT; US UPPER  EXTREMITY VEINS RIGHT     CLINICAL HISTORY:   r/o dvts;; ? dvt;     TECHNIQUE:   Duplex and color flow Doppler evaluation and dynamic compression was performed of the right and left upper extremity veins.     COMPARISON:   None     FINDINGS:   Right central veins: The internal jugular, subclavian, and axillary veins are patent and free of thrombus.     Right arm veins: The brachial, and basilic veins are patent and compressible.  The cephalic vein is not visualized and occlusion is not entirely excluded.     Left central veins: The internal jugular, subclavian, and axillary veins are patent and free of thrombus.     Left arm veins: The brachial and cephalic veins are patent and compressible, noting that there is partial obscuration by overlying bandage.     There is a peripheral venous catheter within the left basilic vein.  There is surrounding occlusive thrombus and expansion of the vein.     Miscellaneous: N/A    Impression:       Thrombosis of the left basilic vein surrounding a peripheral venous catheter with expansion of the vein.     The right cephalic vein is not visualized and occlusion is not entirely excluded.     No other thrombus is visualized.     This report was flagged in Epic as abnormal.     Electronically signed by resident: Erika Baxter   Date: 09/01/2020   Time: 11:49     Electronically signed by: Reji Ray MD   Date: 09/01/2020   Time: 12:02   X-Ray Chest 1 View [[id number]] Resulted: 09/01/20 0959   Order Status: Completed Updated: 09/01/20 1002   Narrative:     EXAMINATION:   XR CHEST 1 VIEW     CLINICAL HISTORY:   eval pneumonia;     TECHNIQUE:   Single frontal view of the chest was performed.     COMPARISON:   Chest radiographs: 08/30/2020.  08/28/2020.  08/26/2020.     Chest CTA: 08/05/2020.     FINDINGS:   Mediastinal structures are midline.  Mediastinal contours are stable.  Cardiac silhouette and pulmonary vascular distribution are normal.     Lung volumes are normal and  symmetric. I detect no pulmonary disease, pleural fluid, lymph node enlargement, cardiac decompensation, pneumothorax, pneumomediastinum, pneumoperitoneum or significant osseous abnormality.    Impression:       No pneumonia or other acute disease identified.       Electronically signed by: Janett Rosales MD   Date: 09/01/2020   Time: 09:59   X-Ray Chest 1 View [[id number]] Resulted: 08/30/20 1143   Order Status: Completed Updated: 08/30/20 1146   Narrative:     EXAMINATION:   XR CHEST 1 VIEW     CLINICAL HISTORY:   Pneumonia.     TECHNIQUE:   Single frontal view of the chest was performed.     COMPARISON:   Multiple prior radiographs of the chest, most recent from 08/28/2020.     FINDINGS:   The lungs are well expanded and clear. No focal opacities are seen. The pleural spaces are clear.  The cardiac silhouette is unremarkable.  The visualized osseous structures are unremarkable.    Impression:       No acute cardiopulmonary abnormality.       Electronically signed by: Sanford James   Date: 08/30/2020   Time: 11:43   X-Ray Chest AP Portable [[id number]] Resulted: 08/28/20 1353   Order Status: Completed Updated: 08/28/20 1356   Narrative:     EXAMINATION:   XR CHEST AP PORTABLE     CLINICAL HISTORY:   cough;     TECHNIQUE:   Single frontal view of the chest was performed.     COMPARISON:   Chest radiograph: 08/27/2020.     Chest CT: 08/05/2020.     FINDINGS:   Interval extubation and removal of NG tube.     Mediastinal structures are midline. Cardiac silhouette and pulmonary vascular distribution are normal.     Lung volumes are normal and symmetric. I detect no pulmonary disease, pleural fluid, lymph node enlargement, cardiac decompensation, pneumothorax, pneumomediastinum, pneumoperitoneum or significant osseous abnormality.    Impression:       No convincing evidence of disease.  No source for cough identified.       Electronically signed by: Janett Rosales MD   Date: 08/28/2020   Time: 13:53   Imaging  History    2020  Date Procedure Name Status Accession Number Location   09/01/20 11:47 AM US Lower Extremity Veins Bilateral Final [id number] [facility]   09/01/20 11:46 AM US Upper Extremity Veins Right Final [id number] [facility]   09/01/20 11:46 AM US Upper Extremity Veins Left Final [id number] [facility]   09/01/20 09:54 AM X-Ray Chest 1 View Final [id number] [facility]   08/30/20 11:25 AM X-Ray Chest 1 View Final [id number] [facility]   08/28/20 01:50 PM X-Ray Chest AP Portable Final [id number] [facility]   08/27/20 04:42 AM X-Ray Chest 1 View Final [id number] [facility]   08/26/20 10:30 AM X-Ray Abdomen AP 1 View (KUB) Final [id number] [facility]   08/26/20 08:28 AM X-Ray Abdomen AP 1 View (KUB) Final [id number] [facility]   08/26/20 03:30 AM X-Ray Chest 1 View Final [id number] [facility]   08/14/20 11:20 AM X-Ray Chest 1 View Final [id number] [facility]   08/11/20 01:44 PM CT Abdomen Pelvis With Contrast Final [id number] [facility]   08/09/20 07:38 AM X-Ray Chest 1 View Final [id number] [facility]   08/07/20 10:14 AM X-Ray Chest 1 View Final [id number] [facility]   08/05/20 09:15 AM CTA Chest Non-Coronary Final [id number] [facility]   08/04/20 12:56 PM X-Ray Chest AP Portable Final [id number] [facility]   08/04/20 12:00 AM CARDIAC MONITORING STRIPS Final     08/04/20 12:00 AM CARDIAC MONITORING STRIPS Final     08/26/20 11:20 AM Echo Color Flow Doppler? Yes Final [id number] [facility]   08/10/20 01:52 PM Echo Color Flow Doppler? Yes; Bubble Contrast? No Final [id number] [facility]

## 2020-09-09 NOTE — TREATMENT PLAN
Brief GI Treatment Plan    Flex sig with biopsies today.  Please see full procedure report.  Await pathology results.  Continue infectious workup.    Junior Sloan MD  GI Fellow

## 2020-09-09 NOTE — TRANSFER OF CARE
"Anesthesia Transfer of Care Note    Patient: Glen Moscoso    Procedure(s) Performed: Procedure(s) (LRB):  SIGMOIDOSCOPY, FLEXIBLE (N/A)    Patient location: PACU    Anesthesia Type: general    Transport from OR: Transported from OR on room air with adequate spontaneous ventilation    Post pain: adequate analgesia    Post assessment: tolerated procedure well and no apparent anesthetic complications    Post vital signs: stable    Level of consciousness: awake and alert    Nausea/Vomiting: no nausea/vomiting    Complications: none    Transfer of care protocol was followed      Last vitals:   Visit Vitals  /75 (BP Location: Right leg, Patient Position: Lying)   Pulse 96   Temp 36.6 °C (97.9 °F) (Temporal)   Resp 16   Ht 4' 9" (1.448 m)   Wt 33.1 kg (73 lb)   SpO2 100%   BMI 15.80 kg/m²     "

## 2020-09-09 NOTE — DISCHARGE INSTRUCTIONS
When Your Child Needs a Colonoscopy or Sigmoidoscopy  A colonoscopy is a test that allows the doctor to look inside the colon and rectum. A sigmoidoscopy is a shorter version of this test that only includes the lower part of the colon, called the sigmoid colon, and the rectum. The doctor may take tissue samples (biopsy), and check for tissue growths (polyps) or bleeding. The time needed for the test will depend on how clean the colon is, the condition, and the treatment necessary. In general, colonoscopy takes about 30 minutes and sigmoidoscopy takes about 10 to 15 minutes.     A colonoscope gives the doctor an inside view of the entire colon.         A sigmoidoscope gives the doctor an inside view of the rectum and sigmoid colon.      Before the Test  Your childs colon may need to be cleaned out before the test. Follow any instructions given by the doctor. These may include:  · Have your child drink a liquid bowel prep before the test.  · Switch your child to a clear liquid diet 1 to 2 days before the test.  · Give your child a laxative, a suppository, or enema the night before and on the day of the test.   Let the doctor know  For your childs safety, let the doctor know if your child has any of the following:  · Allergies to any medicine, sedative, or anesthesia  · Heart or lung problems  · Takes any medicines, especially aspirin   During the test  A colonoscopy or sigmoidoscopy is done by a doctor in an office, testing center, or hospital.  · You can stay with your child in the testing room until your child falls asleep or the test begins.  · Your child lies on an exam table on his or her left side.  · Your child is given a pain reliever and medicine that makes your child sleepy (sedative). This is done through an IV line. Or your child is given medicine that makes your child sleep (anesthesia) by facemask or IV. A trained nurse or anesthesiologist helps with this process and also monitors your child. Special  equipment is used to check your childs heart rate, blood pressure, and blood oxygen levels. Sigmoidoscopy usually doesnt require your child to be sedated.  · The doctor inserts a colonoscope or sigmoidoscope into your childs rectum and colon. This is a long, flexible tube with a camera and a light at the end. During a sigmoidoscopy, the scope will only advance through the sigmoid colon. It will sometimes travel a bit farther if the view is clear and the child is comfortable.  · Air is pushed through the scope to expand your childs lower GI tract. Water may also be used to clean the colon.  · Images of your childs colon are viewed on a screen as the scope moves along.  · The doctor may take tissue samples and remove any polyps that are found.  After the test  When the test is done, you can expect the following:  · If a sedative or anesthesia was given, your child is taken to a recovery room. It may take 1 to 2 hours for the medicine to wear off.  · Your child can return to his or her normal routine and diet right away, unless told otherwise.  · The doctor may discuss early results with you after the test. Youre given complete results when theyre ready.  Helping your child get ready  You can help your child by preparing in advance. How you do this depends on your childs needs. Try the following:  · Explain that the doctor will be testing the colon and rectum. Use brief and simple terms to describe the test. Younger children have shorter attention spans, so do this shortly before the test. Older children can be given more time to understand the test in advance.   · As best you can, describe how the test will feel. An IV may be inserted into the arm to give medicines. This may cause a little sting. Your child wont feel anything once the medicines take effect.  · If your child is not sedated then mild cramping is common.  · Allow your child to ask questions.  · Use play when helpful. This can involve  role-playing with a childs favorite toy or object. It may help older children to see pictures of what happens during the test.   When to call your childs doctor  Call your doctor if your child has any of the following:  · Abdominal pain, nausea, or vomiting  · A large amount of blood in the stool right after the test or blood in the stool for several days  · Persistent fever over 100.4°F (38.0°C), or as directed by your healthcare provider   Date Last Reviewed: 8/1/2016  © 3792-8383 [employer]. [address] [place] [address]. All rights reserved. This information is not intended as a substitute for professional medical care. Always follow your healthcare professional's instructions.

## 2020-09-09 NOTE — SUBJECTIVE & OBJECTIVE
Interval History: No AEON.  Afebrile and WBC WNL.  Gen sx debrided wounds at bedside and found abscess under right ischial eschar.  BCXs NGTD.      Review of Systems   Unable to perform ROS: Patient nonverbal     Objective:     Vital Signs (Most Recent):  Temp: 99.6 °F (37.6 °C) (09/08/20 1948)  Pulse: 103 (09/08/20 1958)  Resp: 15 (09/08/20 1958)  BP: (!) 115/59 (09/08/20 1948)  SpO2: 96 % (09/08/20 1958) Vital Signs (24h Range):  Temp:  [97.9 °F (36.6 °C)-99.6 °F (37.6 °C)] 99.6 °F (37.6 °C)  Pulse:  [] 103  Resp:  [15-20] 15  SpO2:  [93 %-98 %] 96 %  BP: (105-123)/(55-75) 115/59     Weight: 33.1 kg (73 lb)  Body mass index is 15.8 kg/m².    Estimated Creatinine Clearance: 90.4 mL/min (based on SCr of 0.6 mg/dL).    Physical Exam  Vitals signs and nursing note reviewed.   Constitutional:       General: He is not in acute distress.     Appearance: He is not diaphoretic.       HENT:      Head: Atraumatic.      Nose: Nose normal.      Mouth/Throat:      Mouth: Mucous membranes are dry.      Comments: Dry cracked lips  Eyes:      General: No scleral icterus.     Conjunctiva/sclera: Conjunctivae normal.   Cardiovascular:      Rate and Rhythm: Regular rhythm. Tachycardia present.      Heart sounds: Normal heart sounds.   Pulmonary:      Effort: Pulmonary effort is normal. No respiratory distress.      Comments: Course breath sounds bilaterally  RA  Abdominal:      General: There is no distension.      Palpations: Abdomen is soft.   Musculoskeletal:      Right lower leg: No edema.      Left lower leg: No edema.   Skin:     Findings: Lesion present. No rash.      Comments: Necrotic ischial decubitus ulcerations   Neurological:      Mental Status: He is alert.       9/4                Significant Labs:   Blood Culture:   Recent Labs   Lab 08/08/20  1112 08/26/20  0305 08/26/20  0318 09/01/20  0043 09/01/20  0057   LABBLOO No growth after 5 days.  No growth after 5 days. No growth after 5 days. No growth after 5  days. No growth after 5 days. No growth after 5 days.     CBC:   Recent Labs   Lab 09/07/20  0713 09/08/20  0558   WBC 7.47 6.10   HGB 7.8* 6.9*   HCT 25.9* 23.3*   * 668*     CMP:   Recent Labs   Lab 09/07/20  0713 09/08/20  0558    137   K 4.1 3.6    105   CO2 24 26   GLU 89 120*   BUN 7 8   CREATININE 0.5 0.6   CALCIUM 8.1* 8.1*   PROT 6.4 6.3   ALBUMIN 1.7* 1.8*   BILITOT 0.2 0.1   ALKPHOS 145* 159*   AST 23 23   ALT 19 20   ANIONGAP 8 6*   EGFRNONAA >60.0 >60.0     Respiratory Culture:   Recent Labs   Lab 08/26/20  0505 08/31/20  2157 09/06/20  1103   GSRESP <10 epithelial cells per low power field.  Moderate WBC's  Many Gram negative rods  Many Gram negative diplococci <10 epithelial cells per low power field.  Rare WBC's  Rare Gram negative rods >10 epithelial cells per low power field  Rare WBC's  Rare Gram negative rods   RESPIRATORYC No S aureus isolated.  SERRATIA MARCESCENS  Moderate  Normal respiratory jose also present  *  PSEUDOMONAS AERUGINOSA  Moderate  * No S aureus or Pseudomonas isolated.  SERRATIA MARCESCENS  Moderate  * No S aureus or Pseudomonas isolated.  SERRATIA MARCESCENS  Moderate  *     Urine Culture:   Recent Labs   Lab 08/26/20  0915   LABURIN No significant growth     Urine Studies:   Recent Labs   Lab 08/09/20  0555 08/26/20  0915   COLORU Yellow Vane   APPEARANCEUA Hazy* Cloudy*   PHUR 6.0 5.0   SPECGRAV >=1.030* 1.025   PROTEINUA 1+* 1+*   GLUCUA Negative Negative   KETONESU Negative Negative   BILIRUBINUA Negative Negative   OCCULTUA 3+* 2+*   NITRITE Negative Negative   UROBILINOGEN Negative  --    LEUKOCYTESUR Negative Trace*   RBCUA 40* 13*   WBCUA 8* 18*   BACTERIA Occasional Occasional   SQUAMEPITHEL  --  1   HYALINECASTS 2* 7*     Wound Culture: No results for input(s): LABAERO in the last 4320 hours.  All pertinent labs within the past 24 hours have been reviewed.    Significant Imaging: I have reviewed all pertinent imaging results/findings  within the past 24 hours.   US Lower Extremity Veins Bilateral [[id number]] Resulted: 09/01/20 1204   Order Status: Completed Updated: 09/01/20 1206   Narrative:     EXAMINATION:   US LOWER EXTREMITY VEINS BILATERAL     CLINICAL HISTORY:   r/o DVTs;     TECHNIQUE:   Duplex and color flow Doppler and dynamic compression was performed of the bilateral lower extremity veins was performed.     COMPARISON:   None     FINDINGS:   Right thigh veins: The common femoral, femoral, popliteal, upper greater saphenous, and deep femoral veins are patent and free of thrombus. The veins are normally compressible and have normal phasic flow and augmentation response.     Right calf veins: The visualized calf veins are patent.     Left thigh veins: The common femoral, femoral, popliteal, upper greater saphenous, and deep femoral veins are patent and free of thrombus. The veins are normally compressible and have normal phasic flow and augmentation response.     Left calf veins: The visualized calf veins are patent.     There is a heterogeneous collection within the right lateral leg measuring 3.8 x 1.7 x 3.0 cm.  No drainable collection.    Impression:       No evidence of deep venous thrombosis in either lower extremity.     Heterogeneous collection within the lateral aspect of the right lower extremity at the level of the hip, which may represent a small hematoma.  No focal drainable fluid collection.     Electronically signed by resident: Erika Baxter   Date: 09/01/2020   Time: 11:57     Electronically signed by: Reij Ray MD   Date: 09/01/2020   Time: 12:04   US Upper Extremity Veins Right [[id number]] (Abnormal) Resulted: 09/01/20 1202   Order Status: Completed Updated: 09/01/20 1205   Narrative:     EXAMINATION:   US UPPER EXTREMITY VEINS LEFT; US UPPER EXTREMITY VEINS RIGHT     CLINICAL HISTORY:   r/o dvts;; ? dvt;     TECHNIQUE:   Duplex and color flow Doppler evaluation and dynamic compression was performed of the right  and left upper extremity veins.     COMPARISON:   None     FINDINGS:   Right central veins: The internal jugular, subclavian, and axillary veins are patent and free of thrombus.     Right arm veins: The brachial, and basilic veins are patent and compressible.  The cephalic vein is not visualized and occlusion is not entirely excluded.     Left central veins: The internal jugular, subclavian, and axillary veins are patent and free of thrombus.     Left arm veins: The brachial and cephalic veins are patent and compressible, noting that there is partial obscuration by overlying bandage.     There is a peripheral venous catheter within the left basilic vein.  There is surrounding occlusive thrombus and expansion of the vein.     Miscellaneous: N/A    Impression:       Thrombosis of the left basilic vein surrounding a peripheral venous catheter with expansion of the vein.     The right cephalic vein is not visualized and occlusion is not entirely excluded.     No other thrombus is visualized.     This report was flagged in Epic as abnormal.     Electronically signed by resident: Erika Baxter   Date: 09/01/2020   Time: 11:49     Electronically signed by: Reji Ray MD   Date: 09/01/2020   Time: 12:02   US Upper Extremity Veins Left [[id number]] (Abnormal) Resulted: 09/01/20 1202   Order Status: Completed Updated: 09/01/20 1205   Narrative:     EXAMINATION:   US UPPER EXTREMITY VEINS LEFT; US UPPER EXTREMITY VEINS RIGHT     CLINICAL HISTORY:   r/o dvts;; ? dvt;     TECHNIQUE:   Duplex and color flow Doppler evaluation and dynamic compression was performed of the right and left upper extremity veins.     COMPARISON:   None     FINDINGS:   Right central veins: The internal jugular, subclavian, and axillary veins are patent and free of thrombus.     Right arm veins: The brachial, and basilic veins are patent and compressible.  The cephalic vein is not visualized and occlusion is not entirely excluded.     Left central  veins: The internal jugular, subclavian, and axillary veins are patent and free of thrombus.     Left arm veins: The brachial and cephalic veins are patent and compressible, noting that there is partial obscuration by overlying bandage.     There is a peripheral venous catheter within the left basilic vein.  There is surrounding occlusive thrombus and expansion of the vein.     Miscellaneous: N/A    Impression:       Thrombosis of the left basilic vein surrounding a peripheral venous catheter with expansion of the vein.     The right cephalic vein is not visualized and occlusion is not entirely excluded.     No other thrombus is visualized.     This report was flagged in Epic as abnormal.     Electronically signed by resident: Erika Baxter   Date: 09/01/2020   Time: 11:49     Electronically signed by: Reji Ray MD   Date: 09/01/2020   Time: 12:02   X-Ray Chest 1 View [[id number]] Resulted: 09/01/20 0959   Order Status: Completed Updated: 09/01/20 1002   Narrative:     EXAMINATION:   XR CHEST 1 VIEW     CLINICAL HISTORY:   eval pneumonia;     TECHNIQUE:   Single frontal view of the chest was performed.     COMPARISON:   Chest radiographs: 08/30/2020.  08/28/2020.  08/26/2020.     Chest CTA: 08/05/2020.     FINDINGS:   Mediastinal structures are midline.  Mediastinal contours are stable.  Cardiac silhouette and pulmonary vascular distribution are normal.     Lung volumes are normal and symmetric. I detect no pulmonary disease, pleural fluid, lymph node enlargement, cardiac decompensation, pneumothorax, pneumomediastinum, pneumoperitoneum or significant osseous abnormality.    Impression:       No pneumonia or other acute disease identified.       Electronically signed by: Janett Rosales MD   Date: 09/01/2020   Time: 09:59   X-Ray Chest 1 View [[id number]] Resulted: 08/30/20 1143   Order Status: Completed Updated: 08/30/20 1146   Narrative:     EXAMINATION:   XR CHEST 1 VIEW     CLINICAL HISTORY:   Pneumonia.      TECHNIQUE:   Single frontal view of the chest was performed.     COMPARISON:   Multiple prior radiographs of the chest, most recent from 08/28/2020.     FINDINGS:   The lungs are well expanded and clear. No focal opacities are seen. The pleural spaces are clear.  The cardiac silhouette is unremarkable.  The visualized osseous structures are unremarkable.    Impression:       No acute cardiopulmonary abnormality.       Electronically signed by: Sanford James   Date: 08/30/2020   Time: 11:43   X-Ray Chest AP Portable [[id number]] Resulted: 08/28/20 1353   Order Status: Completed Updated: 08/28/20 1356   Narrative:     EXAMINATION:   XR CHEST AP PORTABLE     CLINICAL HISTORY:   cough;     TECHNIQUE:   Single frontal view of the chest was performed.     COMPARISON:   Chest radiograph: 08/27/2020.     Chest CT: 08/05/2020.     FINDINGS:   Interval extubation and removal of NG tube.     Mediastinal structures are midline. Cardiac silhouette and pulmonary vascular distribution are normal.     Lung volumes are normal and symmetric. I detect no pulmonary disease, pleural fluid, lymph node enlargement, cardiac decompensation, pneumothorax, pneumomediastinum, pneumoperitoneum or significant osseous abnormality.    Impression:       No convincing evidence of disease.  No source for cough identified.       Electronically signed by: Janett Rosales MD   Date: 08/28/2020   Time: 13:53   Imaging History    2020  Date Procedure Name Status Accession Number Location   09/01/20 11:47 AM US Lower Extremity Veins Bilateral Final [id number] [facility]   09/01/20 11:46 AM US Upper Extremity Veins Right Final [id number] [facility]   09/01/20 11:46 AM US Upper Extremity Veins Left Final [id number] [facility]   09/01/20 09:54 AM X-Ray Chest 1 View Final [id number] [facility]   08/30/20 11:25 AM X-Ray Chest 1 View Final [id number] [facility]   08/28/20 01:50 PM X-Ray Chest AP Portable Final [id number] [facility]   08/27/20 04:42 AM X-Ray Chest 1 View Final [id number] [facility]    08/26/20 10:30 AM X-Ray Abdomen AP 1 View (KUB) Final [id number] [facility]   08/26/20 08:28 AM X-Ray Abdomen AP 1 View (KUB) Final [id number] [facility]   08/26/20 03:30 AM X-Ray Chest 1 View Final [id number] [facility]   08/14/20 11:20 AM X-Ray Chest 1 View Final [id number] [facility]   08/11/20 01:44 PM CT Abdomen Pelvis With Contrast Final [id number] [facility]   08/09/20 07:38 AM X-Ray Chest 1 View Final [id number] [facility]   08/07/20 10:14 AM X-Ray Chest 1 View Final [id number] [facility]   08/05/20 09:15 AM CTA Chest Non-Coronary Final [id number] [facility]   08/04/20 12:56 PM X-Ray Chest AP Portable Final [id number] [facility]   08/04/20 12:00 AM CARDIAC MONITORING STRIPS Final     08/04/20 12:00 AM CARDIAC MONITORING STRIPS Final     08/26/20 11:20 AM Echo Color Flow Doppler? Yes Final [id number] [facility]   08/10/20 01:52 PM Echo Color Flow Doppler? Yes; Bubble Contrast? No Final [id number] [facility]

## 2020-09-09 NOTE — ASSESSMENT & PLAN NOTE
24 year old male with history of cerebral palsy and seizures who presents with encephalopathy, diarrhea and respiratory distress initially requiring intubation. EEG negative for seizures. CXR clear. WBC 22 K on admit.     Blood/urine cx negative. Resp cx grew Pseudomonas and Serratia. He was started on broad spectrum abx and IVF on admit with improvement in mentation and extubated 8/27. ABx deescalated to Cipro on 8/29. Since then he has been having fevers.    He was found to have left basilic vein thrombosis around midline and it was removed. Also with bilateral necrotic ischial ulcerations with fluid collection within the lateral aspect of the right lower extremity at the level of the hip.    Repeat blood cx are sterile. Repeat resp cx with Serratia (now quinolone R). Still with diarrhea. Was febrile, hypoxic and tachycardic. ID consulted for abx recs.    Patient started on zosyn, on RA with O2 Sat WNL. On day 6 of Zosyn - repeat induced sputum culture appears contaminated and still growing serratia but o2 Sat WNL on RA - colonization suspected    Gen Sx debrided ischial ulcers at bedside and abscess found under R ischial eschar - no culture done - per dw them no bone exposed and recent CT abd had no mention of osteomyelitis findings  C Diff neg  GI now following for diarrhea and planning on flex sig eval - stool studies sent  Stable - afebrile and WBC WNL  ABD tender with indurated pulsatile mass in RM and RU quadrant.    Plan  - Continue IV Zosyn min 7 days - consider 14d total given ischial abscess  - rec US of ABD dhara RM and RU quadrant to eval mass.  - aggressive wound care to ischial wounds - previously no sign of cellulitis - rec wound care consulted.  - fu GI results and stool studies  - ID will follow

## 2020-09-10 ENCOUNTER — TELEPHONE (OUTPATIENT)
Dept: WOUND CARE | Facility: CLINIC | Age: 25
End: 2020-09-10

## 2020-09-10 PROBLEM — R50.9 FEVER: Status: RESOLVED | Noted: 2020-09-03 | Resolved: 2020-09-10

## 2020-09-10 PROBLEM — E83.39 HYPOPHOSPHATEMIA: Status: RESOLVED | Noted: 2020-08-30 | Resolved: 2020-09-10

## 2020-09-10 PROBLEM — J96.01 ACUTE RESPIRATORY FAILURE WITH HYPOXIA: Status: RESOLVED | Noted: 2020-08-30 | Resolved: 2020-09-10

## 2020-09-10 PROBLEM — G40.901 STATUS EPILEPTICUS: Status: RESOLVED | Noted: 2020-09-02 | Resolved: 2020-09-10

## 2020-09-10 PROBLEM — G93.40 ENCEPHALOPATHY ACUTE: Status: RESOLVED | Noted: 2020-08-26 | Resolved: 2020-09-10

## 2020-09-10 PROBLEM — L89.322 DECUBITUS ULCER OF LEFT BUTTOCK, STAGE 2: Status: RESOLVED | Noted: 2020-08-12 | Resolved: 2020-09-10

## 2020-09-10 PROBLEM — E87.0 HYPERNATREMIA: Status: RESOLVED | Noted: 2020-08-30 | Resolved: 2020-09-10

## 2020-09-10 PROBLEM — E87.6 HYPOKALEMIA: Status: RESOLVED | Noted: 2020-08-04 | Resolved: 2020-09-10

## 2020-09-10 PROBLEM — L89.324 DECUBITUS ULCER OF ISCHIAL AREA, LEFT, STAGE IV: Status: ACTIVE | Noted: 2020-09-10

## 2020-09-10 PROBLEM — R19.7 DIARRHEA: Status: ACTIVE | Noted: 2020-09-10

## 2020-09-10 PROBLEM — D62 ACUTE BLOOD LOSS ANEMIA: Status: ACTIVE | Noted: 2020-09-10

## 2020-09-10 PROBLEM — R06.1 BIPHASIC STRIDOR: Status: RESOLVED | Noted: 2020-08-30 | Resolved: 2020-09-10

## 2020-09-10 LAB
ALBUMIN SERPL BCP-MCNC: 1.9 G/DL (ref 3.5–5.2)
ALP SERPL-CCNC: 135 U/L (ref 55–135)
ALT SERPL W/O P-5'-P-CCNC: 18 U/L (ref 10–44)
ANION GAP SERPL CALC-SCNC: 9 MMOL/L (ref 8–16)
AST SERPL-CCNC: 15 U/L (ref 10–40)
BASOPHILS # BLD AUTO: 0.03 K/UL (ref 0–0.2)
BASOPHILS NFR BLD: 0.4 % (ref 0–1.9)
BILIRUB SERPL-MCNC: 0.2 MG/DL (ref 0.1–1)
BUN SERPL-MCNC: 10 MG/DL (ref 6–20)
CALCIUM SERPL-MCNC: 8.6 MG/DL (ref 8.7–10.5)
CHLORIDE SERPL-SCNC: 111 MMOL/L (ref 95–110)
CO2 SERPL-SCNC: 24 MMOL/L (ref 23–29)
CREAT SERPL-MCNC: 0.6 MG/DL (ref 0.5–1.4)
DIFFERENTIAL METHOD: ABNORMAL
EOSINOPHIL # BLD AUTO: 0.1 K/UL (ref 0–0.5)
EOSINOPHIL NFR BLD: 1.4 % (ref 0–8)
ERYTHROCYTE [DISTWIDTH] IN BLOOD BY AUTOMATED COUNT: 17.5 % (ref 11.5–14.5)
EST. GFR  (AFRICAN AMERICAN): >60 ML/MIN/1.73 M^2
EST. GFR  (NON AFRICAN AMERICAN): >60 ML/MIN/1.73 M^2
GLUCOSE SERPL-MCNC: 113 MG/DL (ref 70–110)
HCT VFR BLD AUTO: 32.2 % (ref 40–54)
HGB BLD-MCNC: 10.1 G/DL (ref 14–18)
IMM GRANULOCYTES # BLD AUTO: 0.02 K/UL (ref 0–0.04)
IMM GRANULOCYTES NFR BLD AUTO: 0.2 % (ref 0–0.5)
LYMPHOCYTES # BLD AUTO: 1.6 K/UL (ref 1–4.8)
LYMPHOCYTES NFR BLD: 19.8 % (ref 18–48)
MAGNESIUM SERPL-MCNC: 1.9 MG/DL (ref 1.6–2.6)
MCH RBC QN AUTO: 30.2 PG (ref 27–31)
MCHC RBC AUTO-ENTMCNC: 31.4 G/DL (ref 32–36)
MCV RBC AUTO: 96 FL (ref 82–98)
MONOCYTES # BLD AUTO: 1.1 K/UL (ref 0.3–1)
MONOCYTES NFR BLD: 13.3 % (ref 4–15)
NEUTROPHILS # BLD AUTO: 5.2 K/UL (ref 1.8–7.7)
NEUTROPHILS NFR BLD: 64.9 % (ref 38–73)
NRBC BLD-RTO: 0 /100 WBC
PHOSPHATE SERPL-MCNC: 4 MG/DL (ref 2.7–4.5)
PLATELET # BLD AUTO: 888 K/UL (ref 150–350)
PMV BLD AUTO: 9.2 FL (ref 9.2–12.9)
POTASSIUM SERPL-SCNC: 3.8 MMOL/L (ref 3.5–5.1)
PROT SERPL-MCNC: 6.6 G/DL (ref 6–8.4)
RBC # BLD AUTO: 3.34 M/UL (ref 4.6–6.2)
SODIUM SERPL-SCNC: 144 MMOL/L (ref 136–145)
WBC # BLD AUTO: 8.05 K/UL (ref 3.9–12.7)

## 2020-09-10 PROCEDURE — 94761 N-INVAS EAR/PLS OXIMETRY MLT: CPT

## 2020-09-10 PROCEDURE — 83735 ASSAY OF MAGNESIUM: CPT

## 2020-09-10 PROCEDURE — 85025 COMPLETE CBC W/AUTO DIFF WBC: CPT

## 2020-09-10 PROCEDURE — 99232 SBSQ HOSP IP/OBS MODERATE 35: CPT | Mod: ,,, | Performed by: INTERNAL MEDICINE

## 2020-09-10 PROCEDURE — 25000003 PHARM REV CODE 250: Performed by: STUDENT IN AN ORGANIZED HEALTH CARE EDUCATION/TRAINING PROGRAM

## 2020-09-10 PROCEDURE — 25000003 PHARM REV CODE 250: Performed by: HOSPITALIST

## 2020-09-10 PROCEDURE — 99900035 HC TECH TIME PER 15 MIN (STAT)

## 2020-09-10 PROCEDURE — 94668 MNPJ CHEST WALL SBSQ: CPT

## 2020-09-10 PROCEDURE — 99232 PR SUBSEQUENT HOSPITAL CARE,LEVL II: ICD-10-PCS | Mod: ,,, | Performed by: INTERNAL MEDICINE

## 2020-09-10 PROCEDURE — 99233 SBSQ HOSP IP/OBS HIGH 50: CPT | Mod: ,,, | Performed by: PHYSICIAN ASSISTANT

## 2020-09-10 PROCEDURE — 25000003 PHARM REV CODE 250: Performed by: PHYSICIAN ASSISTANT

## 2020-09-10 PROCEDURE — 80053 COMPREHEN METABOLIC PANEL: CPT

## 2020-09-10 PROCEDURE — 63600175 PHARM REV CODE 636 W HCPCS: Performed by: HOSPITALIST

## 2020-09-10 PROCEDURE — 99233 PR SUBSEQUENT HOSPITAL CARE,LEVL III: ICD-10-PCS | Mod: ,,, | Performed by: PHYSICIAN ASSISTANT

## 2020-09-10 PROCEDURE — 84100 ASSAY OF PHOSPHORUS: CPT

## 2020-09-10 PROCEDURE — 36415 COLL VENOUS BLD VENIPUNCTURE: CPT

## 2020-09-10 PROCEDURE — 11000001 HC ACUTE MED/SURG PRIVATE ROOM

## 2020-09-10 RX ADMIN — POTASSIUM & SODIUM PHOSPHATES POWDER PACK 280-160-250 MG 2 PACKET: 280-160-250 PACK at 03:09

## 2020-09-10 RX ADMIN — BACLOFEN 10 MG: 10 TABLET ORAL at 08:09

## 2020-09-10 RX ADMIN — GABAPENTIN 250 MG: 250 SOLUTION ORAL at 10:09

## 2020-09-10 RX ADMIN — PANTOPRAZOLE SODIUM 40 MG: 40 GRANULE, DELAYED RELEASE ORAL at 08:09

## 2020-09-10 RX ADMIN — POTASSIUM & SODIUM PHOSPHATES POWDER PACK 280-160-250 MG 2 PACKET: 280-160-250 PACK at 06:09

## 2020-09-10 RX ADMIN — BACLOFEN 10 MG: 10 TABLET ORAL at 03:09

## 2020-09-10 RX ADMIN — PROPRANOLOL HYDROCHLORIDE 20 MG: 20 TABLET ORAL at 09:09

## 2020-09-10 RX ADMIN — HEPARIN SODIUM 5000 UNITS: 5000 INJECTION INTRAVENOUS; SUBCUTANEOUS at 10:09

## 2020-09-10 RX ADMIN — PIPERACILLIN SODIUM AND TAZOBACTAM SODIUM 4.5 G: 4; .5 INJECTION, POWDER, LYOPHILIZED, FOR SOLUTION INTRAVENOUS at 08:09

## 2020-09-10 RX ADMIN — DIPHENHYDRAMINE HYDROCHLORIDE 12.5 MG: 25 SOLUTION ORAL at 10:09

## 2020-09-10 RX ADMIN — BACLOFEN 10 MG: 10 TABLET ORAL at 10:09

## 2020-09-10 RX ADMIN — PROPRANOLOL HYDROCHLORIDE 20 MG: 20 TABLET ORAL at 10:09

## 2020-09-10 RX ADMIN — HEPARIN SODIUM 5000 UNITS: 5000 INJECTION INTRAVENOUS; SUBCUTANEOUS at 03:09

## 2020-09-10 RX ADMIN — PHENOBARBITAL 100 MG: 20 ELIXIR ORAL at 10:09

## 2020-09-10 RX ADMIN — HEPARIN SODIUM 5000 UNITS: 5000 INJECTION INTRAVENOUS; SUBCUTANEOUS at 09:09

## 2020-09-10 RX ADMIN — Medication 1 CAPSULE: at 08:09

## 2020-09-10 RX ADMIN — POTASSIUM & SODIUM PHOSPHATES POWDER PACK 280-160-250 MG 2 PACKET: 280-160-250 PACK at 12:09

## 2020-09-10 RX ADMIN — PROPRANOLOL HYDROCHLORIDE 20 MG: 20 TABLET ORAL at 03:09

## 2020-09-10 RX ADMIN — PIPERACILLIN SODIUM AND TAZOBACTAM SODIUM 4.5 G: 4; .5 INJECTION, POWDER, LYOPHILIZED, FOR SOLUTION INTRAVENOUS at 03:09

## 2020-09-10 NOTE — SUBJECTIVE & OBJECTIVE
Interval History: No AEON.  Afebrile and WBC WNL.  Blood cultures remain negative.  Abdominal US for RUQ pain negative for abdominal process.  Remains on IV zosyn.  O2 sats 98% on room air.     Review of Systems   Unable to perform ROS: Patient nonverbal     Objective:     Vital Signs (Most Recent):  Temp: 97.9 °F (36.6 °C) (09/10/20 1100)  Pulse: 100 (09/10/20 1320)  Resp: 18 (09/10/20 1320)  BP: (!) 140/62 (09/10/20 1100)  SpO2: 98 % (09/10/20 1320) Vital Signs (24h Range):  Temp:  [97.4 °F (36.3 °C)-98.9 °F (37.2 °C)] 97.9 °F (36.6 °C)  Pulse:  [] 100  Resp:  [18-20] 18  SpO2:  [94 %-100 %] 98 %  BP: ()/(62-86) 140/62     Weight: 33.1 kg (73 lb)  Body mass index is 15.8 kg/m².    Estimated Creatinine Clearance: 90.4 mL/min (based on SCr of 0.6 mg/dL).    Physical Exam  Vitals signs and nursing note reviewed.   Constitutional:       General: He is not in acute distress.     Appearance: He is not toxic-appearing or diaphoretic.          Comments: Thin, contracted.  No distress.    HENT:      Head: Normocephalic and atraumatic.      Mouth/Throat:      Mouth: Mucous membranes are dry.      Comments: Dry cracked lips  Eyes:      General: No scleral icterus.     Conjunctiva/sclera: Conjunctivae normal.   Cardiovascular:      Rate and Rhythm: Regular rhythm. Tachycardia present.      Heart sounds: Normal heart sounds.   Pulmonary:      Effort: Pulmonary effort is normal. No respiratory distress.      Comments: Course breath sounds bilaterally  On room air.  O2 sats %  Abdominal:      General: There is no distension.      Palpations: Abdomen is soft.      Tenderness: There is no abdominal tenderness.      Comments: PEG site c/di.    Musculoskeletal:      Right lower leg: No edema.      Left lower leg: No edema.      Comments: Contractures upper and lower extremities     Skin:     Findings: No rash.      Comments: Necrotic ischial decubitus ulcerations - not examined.  Photos reviewed - see below.     Neurological:      Mental Status: He is alert.      Comments: Alert       9/4                Significant Labs:   Blood Culture:   Recent Labs   Lab 08/08/20  1112 08/26/20  0305 08/26/20  0318 09/01/20  0043 09/01/20  0057   LABBLOO No growth after 5 days.  No growth after 5 days. No growth after 5 days. No growth after 5 days. No growth after 5 days. No growth after 5 days.     CBC:   Recent Labs   Lab 09/09/20  0650 09/10/20  0341   WBC 10.47 8.05   HGB 10.8* 10.1*   HCT 35.8* 32.2*   * 888*     CMP:   Recent Labs   Lab 09/09/20  0650 09/10/20  0341    144   K 4.1 3.8    111*   CO2 24 24   GLU 79 113*   BUN 8 10   CREATININE 0.6 0.6   CALCIUM 9.0 8.6*   PROT 7.8 6.6   ALBUMIN 2.3* 1.9*   BILITOT 0.4 0.2   ALKPHOS 175* 135   AST 24 15   ALT 24 18   ANIONGAP 11 9   EGFRNONAA >60.0 >60.0     Respiratory Culture:   Recent Labs   Lab 08/26/20  0505 08/31/20  2157 09/06/20  1103   GSRESP <10 epithelial cells per low power field.  Moderate WBC's  Many Gram negative rods  Many Gram negative diplococci <10 epithelial cells per low power field.  Rare WBC's  Rare Gram negative rods >10 epithelial cells per low power field  Rare WBC's  Rare Gram negative rods   RESPIRATORYC No S aureus isolated.  SERRATIA MARCESCENS  Moderate  Normal respiratory jose also present  *  PSEUDOMONAS AERUGINOSA  Moderate  * No S aureus or Pseudomonas isolated.  SERRATIA MARCESCENS  Moderate  * No S aureus or Pseudomonas isolated.  SERRATIA MARCESCENS  Moderate  *     Urine Culture:   Recent Labs   Lab 08/26/20  0915   LABURIN No significant growth     Urine Studies:   Recent Labs   Lab 08/09/20  0555 08/26/20  0915   COLORU Yellow Vane   APPEARANCEUA Hazy* Cloudy*   PHUR 6.0 5.0   SPECGRAV >=1.030* 1.025   PROTEINUA 1+* 1+*   GLUCUA Negative Negative   KETONESU Negative Negative   BILIRUBINUA Negative Negative   OCCULTUA 3+* 2+*   NITRITE Negative Negative   UROBILINOGEN Negative  --    LEUKOCYTESUR Negative  Trace*   RBCUA 40* 13*   WBCUA 8* 18*   BACTERIA Occasional Occasional   SQUAMEPITHEL  --  1   HYALINECASTS 2* 7*     Wound Culture: No results for input(s): LABAERO in the last 4320 hours.  All pertinent labs within the past 24 hours have been reviewed.    Significant Imaging: I have reviewed all pertinent imaging results/findings within the past 24 hours.   US Lower Extremity Veins Bilateral [[id number]] Resulted: 09/01/20 1204   Order Status: Completed Updated: 09/01/20 1206   Narrative:     EXAMINATION:   US LOWER EXTREMITY VEINS BILATERAL     CLINICAL HISTORY:   r/o DVTs;     TECHNIQUE:   Duplex and color flow Doppler and dynamic compression was performed of the bilateral lower extremity veins was performed.     COMPARISON:   None     FINDINGS:   Right thigh veins: The common femoral, femoral, popliteal, upper greater saphenous, and deep femoral veins are patent and free of thrombus. The veins are normally compressible and have normal phasic flow and augmentation response.     Right calf veins: The visualized calf veins are patent.     Left thigh veins: The common femoral, femoral, popliteal, upper greater saphenous, and deep femoral veins are patent and free of thrombus. The veins are normally compressible and have normal phasic flow and augmentation response.     Left calf veins: The visualized calf veins are patent.     There is a heterogeneous collection within the right lateral leg measuring 3.8 x 1.7 x 3.0 cm.  No drainable collection.    Impression:       No evidence of deep venous thrombosis in either lower extremity.     Heterogeneous collection within the lateral aspect of the right lower extremity at the level of the hip, which may represent a small hematoma.  No focal drainable fluid collection.     Electronically signed by resident: Erika Baxter   Date: 09/01/2020   Time: 11:57     Electronically signed by: Reji Ray MD   Date: 09/01/2020   Time: 12:04   US Upper Extremity Veins Right  [[id number]] (Abnormal) Resulted: 09/01/20 1202   Order Status: Completed Updated: 09/01/20 1205   Narrative:     EXAMINATION:   US UPPER EXTREMITY VEINS LEFT; US UPPER EXTREMITY VEINS RIGHT     CLINICAL HISTORY:   r/o dvts;; ? dvt;     TECHNIQUE:   Duplex and color flow Doppler evaluation and dynamic compression was performed of the right and left upper extremity veins.     COMPARISON:   None     FINDINGS:   Right central veins: The internal jugular, subclavian, and axillary veins are patent and free of thrombus.     Right arm veins: The brachial, and basilic veins are patent and compressible.  The cephalic vein is not visualized and occlusion is not entirely excluded.     Left central veins: The internal jugular, subclavian, and axillary veins are patent and free of thrombus.     Left arm veins: The brachial and cephalic veins are patent and compressible, noting that there is partial obscuration by overlying bandage.     There is a peripheral venous catheter within the left basilic vein.  There is surrounding occlusive thrombus and expansion of the vein.     Miscellaneous: N/A    Impression:       Thrombosis of the left basilic vein surrounding a peripheral venous catheter with expansion of the vein.     The right cephalic vein is not visualized and occlusion is not entirely excluded.     No other thrombus is visualized.     This report was flagged in Epic as abnormal.     Electronically signed by resident: Erika Baxter   Date: 09/01/2020   Time: 11:49     Electronically signed by: Reji Ray MD   Date: 09/01/2020   Time: 12:02   US Upper Extremity Veins Left [[id number]] (Abnormal) Resulted: 09/01/20 1202   Order Status: Completed Updated: 09/01/20 1205   Narrative:     EXAMINATION:   US UPPER EXTREMITY VEINS LEFT; US UPPER EXTREMITY VEINS RIGHT     CLINICAL HISTORY:   r/o dvts;; ? dvt;     TECHNIQUE:   Duplex and color flow Doppler evaluation and dynamic compression was performed of the right and left upper  extremity veins.     COMPARISON:   None     FINDINGS:   Right central veins: The internal jugular, subclavian, and axillary veins are patent and free of thrombus.     Right arm veins: The brachial, and basilic veins are patent and compressible.  The cephalic vein is not visualized and occlusion is not entirely excluded.     Left central veins: The internal jugular, subclavian, and axillary veins are patent and free of thrombus.     Left arm veins: The brachial and cephalic veins are patent and compressible, noting that there is partial obscuration by overlying bandage.     There is a peripheral venous catheter within the left basilic vein.  There is surrounding occlusive thrombus and expansion of the vein.     Miscellaneous: N/A    Impression:       Thrombosis of the left basilic vein surrounding a peripheral venous catheter with expansion of the vein.     The right cephalic vein is not visualized and occlusion is not entirely excluded.     No other thrombus is visualized.     This report was flagged in Epic as abnormal.     Electronically signed by resident: Erika Baxter   Date: 09/01/2020   Time: 11:49     Electronically signed by: Reji Ray MD   Date: 09/01/2020   Time: 12:02   X-Ray Chest 1 View [[id number]] Resulted: 09/01/20 0959   Order Status: Completed Updated: 09/01/20 1002   Narrative:     EXAMINATION:   XR CHEST 1 VIEW     CLINICAL HISTORY:   eval pneumonia;     TECHNIQUE:   Single frontal view of the chest was performed.     COMPARISON:   Chest radiographs: 08/30/2020.  08/28/2020.  08/26/2020.     Chest CTA: 08/05/2020.     FINDINGS:   Mediastinal structures are midline.  Mediastinal contours are stable.  Cardiac silhouette and pulmonary vascular distribution are normal.     Lung volumes are normal and symmetric. I detect no pulmonary disease, pleural fluid, lymph node enlargement, cardiac decompensation, pneumothorax, pneumomediastinum, pneumoperitoneum or significant osseous abnormality.     Impression:       No pneumonia or other acute disease identified.       Electronically signed by: Janett Rosales MD   Date: 09/01/2020   Time: 09:59   X-Ray Chest 1 View [[id number]] Resulted: 08/30/20 1143   Order Status: Completed Updated: 08/30/20 1146   Narrative:     EXAMINATION:   XR CHEST 1 VIEW     CLINICAL HISTORY:   Pneumonia.     TECHNIQUE:   Single frontal view of the chest was performed.     COMPARISON:   Multiple prior radiographs of the chest, most recent from 08/28/2020.     FINDINGS:   The lungs are well expanded and clear. No focal opacities are seen. The pleural spaces are clear.  The cardiac silhouette is unremarkable.  The visualized osseous structures are unremarkable.    Impression:       No acute cardiopulmonary abnormality.       Electronically signed by: Sanford James   Date: 08/30/2020   Time: 11:43   X-Ray Chest AP Portable [[id number]] Resulted: 08/28/20 1353   Order Status: Completed Updated: 08/28/20 1356   Narrative:     EXAMINATION:   XR CHEST AP PORTABLE     CLINICAL HISTORY:   cough;     TECHNIQUE:   Single frontal view of the chest was performed.     COMPARISON:   Chest radiograph: 08/27/2020.     Chest CT: 08/05/2020.     FINDINGS:   Interval extubation and removal of NG tube.     Mediastinal structures are midline. Cardiac silhouette and pulmonary vascular distribution are normal.     Lung volumes are normal and symmetric. I detect no pulmonary disease, pleural fluid, lymph node enlargement, cardiac decompensation, pneumothorax, pneumomediastinum, pneumoperitoneum or significant osseous abnormality.    Impression:       No convincing evidence of disease.  No source for cough identified.       Electronically signed by: Janett Rosales MD   Date: 08/28/2020   Time: 13:53   Imaging History    2020  Date Procedure Name Status Accession Number Location   09/01/20 11:47 AM US Lower Extremity Veins Bilateral Final [id number]   09/01/20 11:46 AM US Upper Extremity Veins Right Final  [id number] [facility]   09/01/20 11:46 AM US Upper Extremity Veins Left Final [id number] [facility]   09/01/20 09:54 AM X-Ray Chest 1 View Final [id number] [facility]   08/30/20 11:25 AM X-Ray Chest 1 View Final [id number] [facility]   08/28/20 01:50 PM X-Ray Chest AP Portable Final [id number] [facility]   08/27/20 04:42 AM X-Ray Chest 1 View Final [id number] [facility]   08/26/20 10:30 AM X-Ray Abdomen AP 1 View (KUB) Final [id number] [facility]   08/26/20 08:28 AM X-Ray Abdomen AP 1 View (KUB) Final [id number] [facility]   08/26/20 03:30 AM X-Ray Chest 1 View Final [id number] [facility]   08/14/20 11:20 AM X-Ray Chest 1 View Final [id number] [facility]   08/11/20 01:44 PM CT Abdomen Pelvis With Contrast Final [id number] [facility]   08/09/20 07:38 AM X-Ray Chest 1 View Final [id number] [facility]   08/07/20 10:14 AM X-Ray Chest 1 View Final [id number] [facility]   08/05/20 09:15 AM CTA Chest Non-Coronary Final [id number] [facility]   08/04/20 12:56 PM X-Ray Chest AP Portable Final [id number] [facility]   08/04/20 12:00 AM CARDIAC MONITORING STRIPS Final     08/04/20 12:00 AM CARDIAC MONITORING STRIPS Final     08/26/20 11:20 AM Echo Color Flow Doppler? Yes Final [id number] [facility]   08/10/20 01:52 PM Echo Color Flow Doppler? Yes; Bubble Contrast? No Final [id number] [facility]

## 2020-09-10 NOTE — NURSING
No acute changes overnight. No bowel movement on night shift. Wound care completed. Wounds are worsening daily. Tunneling increasing. Wet to dry packing as directed continued. Patient continues to be difficult to keep in positions during turning, etc. Pillows and wedges used to attempt position changes. Afebrile.

## 2020-09-10 NOTE — ASSESSMENT & PLAN NOTE
24 year old male with history of cerebral palsy and seizures who presented 8/26 with encephalopathy, diarrhea and respiratory distress initially requiring intubation. EEG negative for seizures. CXR clear. WBC 22 K on admit.      Blood/urine cx negative. Resp cx grew Pseudomonas and Serratia. He was started on broad spectrum abx and IVF on admit with improvement in mentation and extubated 8/27. ABx deescalated to Cipro on 8/29 and subsequently developed fever.       He was found to have left basilic vein thrombosis around midline and it was removed.      Noted to have bilateral necrotic ischial ulcerations with fluid collection within the lateral aspect of the right lower extremity at the level of the hip, s/p bedside I&D by General Surgery.  No cultures sent.  No exposed bone.  No mention of osteomyelitis on CT abd/pelvis.       Repeat blood cx 9/1 negative.  Repeat resp cx of 9/6  with Serratia (now quinolone R).      GI following for diarrhea,.  C dif negative.  Underwent flex/sig 9/9.  Biopsies pending and report indicated no ABNLs.  No signs of bleeding.   Abdominal US obtained to assess RUQ abdominal pain was negative for acute process.  Stool WBC negative. Stool culture, Stool for O&P, and E coli antigen pending.      Remains afebrile, no leukocytosis, hemodynamically stable.  O2 sat on RA 98%.  Stable non septic     Plan  - Continue IV Zosyn. Recommend 14 days from today given ischial wounds/abscess (though no culture data)  -  Continue aggressive wound care to ischial wounds - previously no sign of cellulitis.    -  Will sign off    Infection:     Discharge antibiotics:   Zosyn 4.5g extended infusion over 4 hrs every 8 hrs    End date of IV antibiotics: 9/24/19    Weekly outpatient laboratory on Monday or Tuesday while on IV antibiotics.    CBC   CMP or BMP   ESR and CRP      Fax laboratory results to [facility] at [phone] with attn: Lance Granados PA-C [facility]    Outpatient Infectious Diseases  clinic follow up will be arranged and found in patient calendar.    Prior to discharge, please ensure the patient's follow-up has been scheduled.  If there is still no follow-up scheduled in Infectious Diseases clinic, please send an EPIC message to Norma Smith in Infectious Diseases.

## 2020-09-10 NOTE — NURSING
Night shift 9/8: Patient continues to reposition himself regardless of pillows and turning techniques. Patient eventually positions himself to laying on back with legs spread and arms contracted toward chest. Patient also positions his head back with arched neck. Mother at bedside does not reposition patient during night shift. Patient had 2-3 bms dark brown and loose. Some yellow in stool also. Dressings removed, repacked wet to dry and paper tape with each BM. Patient anatomy is quite difficult to navigate for keeping wound dressing taped down and isolated from frequent liquid stool. Wounds are washed out each time with NS and wound cleanser. Wounds are beginning to tunnel more. Fecal management system may be helpful, defer to MD team. Patient positioning related to wound care may benefit from [facility]. Also would recommend medihoney for wound care.

## 2020-09-10 NOTE — ASSESSMENT & PLAN NOTE
24 year old male with history of cerebral palsy and seizures who presented 8/26 with encephalopathy, diarrhea and respiratory distress initially requiring intubation. EEG negative for seizures. CXR clear. WBC 22 K on admit.      Blood/urine cx negative. Resp cx grew Pseudomonas and Serratia. He was started on broad spectrum abx and IVF on admit with improvement in mentation and extubated 8/27. ABx deescalated to Cipro on 8/29 and subsequently developed fever.       He was found to have left basilic vein thrombosis around midline and it was removed.      Noted to have bilateral necrotic ischial ulcerations with fluid collection within the lateral aspect of the right lower extremity at the level of the hip, s/p bedside I&D by General Surgery.  No cultures sent.  No exposed bone.  No mention of osteomyelitis on CT abd/pelvis.       Repeat blood cx 9/1 negative.  Repeat resp cx of 9/6  with Serratia (now quinolone R).      GI following for diarrhea,.  C dif negative.  Underwent flex/sig today.  Biopsies pending.  No signs of bleeding.   Abdominal US obtained to assess RUQ abdominal pain was negative for acute process.  Stool WBC negative. Stool culture, Stool for O&P, and E coli antigen pending.      Remains afebrile, no leukocytosis, hemodynamically stable.     Plan  - Continue IV Zosyn. Recommend 14 days today given ischial wounds/abscess (though no culture data)  -  Continue aggressive wound care to ischial wounds - previously no sign of cellulitis.    -  Will follow up GI cultures/studies and follow up tomorrow with final recommendations.

## 2020-09-10 NOTE — SUBJECTIVE & OBJECTIVE
Interval History: No AEON.  Afebrile and WBC WNL.  Blood cultures remain negative.  Abdominal US for RUQ pain negative for abdominal process.  Remains on IV zosyn.  O2 sats % on room air.     Review of Systems   Unable to perform ROS: Patient nonverbal     Objective:     Vital Signs (Most Recent):  Temp: 98.3 °F (36.8 °C) (09/09/20 2044)  Pulse: 99 (09/09/20 2044)  Resp: 18 (09/09/20 2044)  BP: 93/62 (09/09/20 2044)  SpO2: 98 % (09/09/20 2044) Vital Signs (24h Range):  Temp:  [97.9 °F (36.6 °C)-99.3 °F (37.4 °C)] 98.3 °F (36.8 °C)  Pulse:  [] 99  Resp:  [16-20] 18  SpO2:  [92 %-100 %] 98 %  BP: ()/(62-86) 93/62     Weight: 33.1 kg (73 lb)  Body mass index is 15.8 kg/m².    Estimated Creatinine Clearance: 90.4 mL/min (based on SCr of 0.6 mg/dL).    Physical Exam  Vitals signs and nursing note reviewed.   Constitutional:       General: He is not in acute distress.     Appearance: He is not toxic-appearing or diaphoretic.          Comments: Thin, contracted.  No distress.    HENT:      Head: Normocephalic and atraumatic.      Mouth/Throat:      Mouth: Mucous membranes are dry.      Comments: Dry cracked lips  Eyes:      General: No scleral icterus.     Conjunctiva/sclera: Conjunctivae normal.   Cardiovascular:      Rate and Rhythm: Regular rhythm. Tachycardia present.      Heart sounds: Normal heart sounds.   Pulmonary:      Effort: Pulmonary effort is normal. No respiratory distress.      Comments: Course breath sounds bilaterally  On room air.  O2 sats %  Abdominal:      General: There is no distension.      Palpations: Abdomen is soft.      Tenderness: There is no abdominal tenderness.      Comments: PEG site c/di.    Musculoskeletal:      Right lower leg: No edema.      Left lower leg: No edema.      Comments: Contractures upper and lower extremities     Skin:     Findings: No rash.      Comments: Necrotic ischial decubitus ulcerations - not examined.  Photos reviewed - see below.     Neurological:      Mental Status: He is alert.      Comments: Alert       9/4                Significant Labs:   Blood Culture:   Recent Labs   Lab 08/08/20  1112 08/26/20  0305 08/26/20  0318 09/01/20  0043 09/01/20  0057   LABBLOO No growth after 5 days.  No growth after 5 days. No growth after 5 days. No growth after 5 days. No growth after 5 days. No growth after 5 days.     CBC:   Recent Labs   Lab 09/08/20  0558 09/09/20  0650   WBC 6.10 10.47   HGB 6.9* 10.8*   HCT 23.3* 35.8*   * 893*     CMP:   Recent Labs   Lab 09/08/20  0558 09/09/20  0650    140   K 3.6 4.1    105   CO2 26 24   * 79   BUN 8 8   CREATININE 0.6 0.6   CALCIUM 8.1* 9.0   PROT 6.3 7.8   ALBUMIN 1.8* 2.3*   BILITOT 0.1 0.4   ALKPHOS 159* 175*   AST 23 24   ALT 20 24   ANIONGAP 6* 11   EGFRNONAA >60.0 >60.0     Respiratory Culture:   Recent Labs   Lab 08/26/20  0505 08/31/20  2157 09/06/20  1103   GSRESP <10 epithelial cells per low power field.  Moderate WBC's  Many Gram negative rods  Many Gram negative diplococci <10 epithelial cells per low power field.  Rare WBC's  Rare Gram negative rods >10 epithelial cells per low power field  Rare WBC's  Rare Gram negative rods   RESPIRATORYC No S aureus isolated.  SERRATIA MARCESCENS  Moderate  Normal respiratory jose also present  *  PSEUDOMONAS AERUGINOSA  Moderate  * No S aureus or Pseudomonas isolated.  SERRATIA MARCESCENS  Moderate  * No S aureus or Pseudomonas isolated.  SERRATIA MARCESCENS  Moderate  *     Urine Culture:   Recent Labs   Lab 08/26/20  0915   LABURIN No significant growth     Urine Studies:   Recent Labs   Lab 08/09/20  0555 08/26/20  0915   COLORU Yellow Vane   APPEARANCEUA Hazy* Cloudy*   PHUR 6.0 5.0   SPECGRAV >=1.030* 1.025   PROTEINUA 1+* 1+*   GLUCUA Negative Negative   KETONESU Negative Negative   BILIRUBINUA Negative Negative   OCCULTUA 3+* 2+*   NITRITE Negative Negative   UROBILINOGEN Negative  --    LEUKOCYTESUR Negative  Trace*   RBCUA 40* 13*   WBCUA 8* 18*   BACTERIA Occasional Occasional   SQUAMEPITHEL  --  1   HYALINECASTS 2* 7*     Wound Culture: No results for input(s): LABAERO in the last 4320 hours.  All pertinent labs within the past 24 hours have been reviewed.    Significant Imaging: I have reviewed all pertinent imaging results/findings within the past 24 hours.   US Lower Extremity Veins Bilateral [[id number]] Resulted: 09/01/20 1204   Order Status: Completed Updated: 09/01/20 1206   Narrative:     EXAMINATION:   US LOWER EXTREMITY VEINS BILATERAL     CLINICAL HISTORY:   r/o DVTs;     TECHNIQUE:   Duplex and color flow Doppler and dynamic compression was performed of the bilateral lower extremity veins was performed.     COMPARISON:   None     FINDINGS:   Right thigh veins: The common femoral, femoral, popliteal, upper greater saphenous, and deep femoral veins are patent and free of thrombus. The veins are normally compressible and have normal phasic flow and augmentation response.     Right calf veins: The visualized calf veins are patent.     Left thigh veins: The common femoral, femoral, popliteal, upper greater saphenous, and deep femoral veins are patent and free of thrombus. The veins are normally compressible and have normal phasic flow and augmentation response.     Left calf veins: The visualized calf veins are patent.     There is a heterogeneous collection within the right lateral leg measuring 3.8 x 1.7 x 3.0 cm.  No drainable collection.    Impression:       No evidence of deep venous thrombosis in either lower extremity.     Heterogeneous collection within the lateral aspect of the right lower extremity at the level of the hip, which may represent a small hematoma.  No focal drainable fluid collection.     Electronically signed by resident: Erika Baxter   Date: 09/01/2020   Time: 11:57     Electronically signed by: Reji Ray MD   Date: 09/01/2020   Time: 12:04   US Upper Extremity Veins Right  [[id number]] (Abnormal) Resulted: 09/01/20 1202   Order Status: Completed Updated: 09/01/20 1205   Narrative:     EXAMINATION:   US UPPER EXTREMITY VEINS LEFT; US UPPER EXTREMITY VEINS RIGHT     CLINICAL HISTORY:   r/o dvts;; ? dvt;     TECHNIQUE:   Duplex and color flow Doppler evaluation and dynamic compression was performed of the right and left upper extremity veins.     COMPARISON:   None     FINDINGS:   Right central veins: The internal jugular, subclavian, and axillary veins are patent and free of thrombus.     Right arm veins: The brachial, and basilic veins are patent and compressible.  The cephalic vein is not visualized and occlusion is not entirely excluded.     Left central veins: The internal jugular, subclavian, and axillary veins are patent and free of thrombus.     Left arm veins: The brachial and cephalic veins are patent and compressible, noting that there is partial obscuration by overlying bandage.     There is a peripheral venous catheter within the left basilic vein.  There is surrounding occlusive thrombus and expansion of the vein.     Miscellaneous: N/A    Impression:       Thrombosis of the left basilic vein surrounding a peripheral venous catheter with expansion of the vein.     The right cephalic vein is not visualized and occlusion is not entirely excluded.     No other thrombus is visualized.     This report was flagged in Epic as abnormal.     Electronically signed by resident: Erika Baxter   Date: 09/01/2020   Time: 11:49     Electronically signed by: Reji Ray MD   Date: 09/01/2020   Time: 12:02   US Upper Extremity Veins Left [[id number]] (Abnormal) Resulted: 09/01/20 1202   Order Status: Completed Updated: 09/01/20 1205   Narrative:     EXAMINATION:   US UPPER EXTREMITY VEINS LEFT; US UPPER EXTREMITY VEINS RIGHT     CLINICAL HISTORY:   r/o dvts;; ? dvt;     TECHNIQUE:   Duplex and color flow Doppler evaluation and dynamic compression was performed of the right and left upper  extremity veins.     COMPARISON:   None     FINDINGS:   Right central veins: The internal jugular, subclavian, and axillary veins are patent and free of thrombus.     Right arm veins: The brachial, and basilic veins are patent and compressible.  The cephalic vein is not visualized and occlusion is not entirely excluded.     Left central veins: The internal jugular, subclavian, and axillary veins are patent and free of thrombus.     Left arm veins: The brachial and cephalic veins are patent and compressible, noting that there is partial obscuration by overlying bandage.     There is a peripheral venous catheter within the left basilic vein.  There is surrounding occlusive thrombus and expansion of the vein.     Miscellaneous: N/A    Impression:       Thrombosis of the left basilic vein surrounding a peripheral venous catheter with expansion of the vein.     The right cephalic vein is not visualized and occlusion is not entirely excluded.     No other thrombus is visualized.     This report was flagged in Epic as abnormal.     Electronically signed by resident: Erika Baxter   Date: 09/01/2020   Time: 11:49     Electronically signed by: Reji Ray MD   Date: 09/01/2020   Time: 12:02   X-Ray Chest 1 View [[id number]] Resulted: 09/01/20 0959   Order Status: Completed Updated: 09/01/20 1002   Narrative:     EXAMINATION:   XR CHEST 1 VIEW     CLINICAL HISTORY:   eval pneumonia;     TECHNIQUE:   Single frontal view of the chest was performed.     COMPARISON:   Chest radiographs: 08/30/2020.  08/28/2020.  08/26/2020.     Chest CTA: 08/05/2020.     FINDINGS:   Mediastinal structures are midline.  Mediastinal contours are stable.  Cardiac silhouette and pulmonary vascular distribution are normal.     Lung volumes are normal and symmetric. I detect no pulmonary disease, pleural fluid, lymph node enlargement, cardiac decompensation, pneumothorax, pneumomediastinum, pneumoperitoneum or significant osseous abnormality.     Impression:       No pneumonia or other acute disease identified.       Electronically signed by: Janett Rosales MD   Date: 09/01/2020   Time: 09:59   X-Ray Chest 1 View [[id number]] Resulted: 08/30/20 1143   Order Status: Completed Updated: 08/30/20 1146   Narrative:     EXAMINATION:   XR CHEST 1 VIEW     CLINICAL HISTORY:   Pneumonia.     TECHNIQUE:   Single frontal view of the chest was performed.     COMPARISON:   Multiple prior radiographs of the chest, most recent from 08/28/2020.     FINDINGS:   The lungs are well expanded and clear. No focal opacities are seen. The pleural spaces are clear.  The cardiac silhouette is unremarkable.  The visualized osseous structures are unremarkable.    Impression:       No acute cardiopulmonary abnormality.       Electronically signed by: Sanford James   Date: 08/30/2020   Time: 11:43   X-Ray Chest AP Portable [[id number]] Resulted: 08/28/20 1353   Order Status: Completed Updated: 08/28/20 1356   Narrative:     EXAMINATION:   XR CHEST AP PORTABLE     CLINICAL HISTORY:   cough;     TECHNIQUE:   Single frontal view of the chest was performed.     COMPARISON:   Chest radiograph: 08/27/2020.     Chest CT: 08/05/2020.     FINDINGS:   Interval extubation and removal of NG tube.     Mediastinal structures are midline. Cardiac silhouette and pulmonary vascular distribution are normal.     Lung volumes are normal and symmetric. I detect no pulmonary disease, pleural fluid, lymph node enlargement, cardiac decompensation, pneumothorax, pneumomediastinum, pneumoperitoneum or significant osseous abnormality.    Impression:       No convincing evidence of disease.  No source for cough identified.       Electronically signed by: Janett Rosales MD   Date: 08/28/2020   Time: 13:53   Imaging History    2020  Date Procedure Name Status Accession Number Location   09/01/20 11:47 AM US Lower Extremity Veins Bilateral Final [id number]   09/01/20 11:46 AM US Upper Extremity Veins Right Final  [id number] [facility]   09/01/20 11:46 AM US Upper Extremity Veins Left Final [id number] [facility]   09/01/20 09:54 AM X-Ray Chest 1 View Final [id number] [facility]   08/30/20 11:25 AM X-Ray Chest 1 View Final [id number] [facility]   08/28/20 01:50 PM X-Ray Chest AP Portable Final [id number] [facility]   08/27/20 04:42 AM X-Ray Chest 1 View Final [id number] [facility]   08/26/20 10:30 AM X-Ray Abdomen AP 1 View (KUB) Final [id number] [facility]   08/26/20 08:28 AM X-Ray Abdomen AP 1 View (KUB) Final [id number] [facility]   08/26/20 03:30 AM X-Ray Chest 1 View Final [id number] [facility]   08/14/20 11:20 AM X-Ray Chest 1 View Final [id number] [facility]   08/11/20 01:44 PM CT Abdomen Pelvis With Contrast Final [id number] [facility]   08/09/20 07:38 AM X-Ray Chest 1 View Final [id number] [facility]   08/07/20 10:14 AM X-Ray Chest 1 View Final [id number] [facility]   08/05/20 09:15 AM CTA Chest Non-Coronary Final [id number] [facility]   08/04/20 12:56 PM X-Ray Chest AP Portable Final [id number] [facility]   08/04/20 12:00 AM CARDIAC MONITORING STRIPS Final     08/04/20 12:00 AM CARDIAC MONITORING STRIPS Final     08/26/20 11:20 AM Echo Color Flow Doppler? Yes Final [id number] [facility]   08/10/20 01:52 PM Echo Color Flow Doppler? Yes; Bubble Contrast? No Final [id number] [facility]

## 2020-09-10 NOTE — PLAN OF CARE
Problem: Adult Inpatient Plan of Care  Goal: Plan of Care Review  Outcome: Ongoing, Progressing  Goal: Patient-Specific Goal (Individualization)  Description: Admit Date: 8/26    Admit Dx: Seizures    Past Medical History:  No date: Acid reflux  No date: Cerebral palsy  No date: History of hip surgery  No date: S/P percutaneous endoscopic gastrostomy (PEG) tube placement  No date: Seizures    History reviewed. No pertinent surgical history.    Individualization:   1. Pt likes legs bent and arms elevated    Restraints: N/A         Outcome: Ongoing, Progressing  Goal: Absence of Hospital-Acquired Illness or Injury  Outcome: Ongoing, Progressing  Goal: Optimal Comfort and Wellbeing  Outcome: Ongoing, Progressing  Goal: Readiness for Transition of Care  Outcome: Ongoing, Progressing  Goal: Rounds/Family Conference  Outcome: Ongoing, Progressing     Problem: Communication Impairment (Mechanical Ventilation, Invasive)  Goal: Effective Communication  Outcome: Ongoing, Progressing     Problem: Device-Related Complication Risk (Mechanical Ventilation, Invasive)  Goal: Optimal Device Function  Outcome: Ongoing, Progressing     Problem: Inability to Wean (Mechanical Ventilation, Invasive)  Goal: Mechanical Ventilation Liberation  Outcome: Ongoing, Progressing     Problem: Nutrition Impairment (Mechanical Ventilation, Invasive)  Goal: Optimal Nutrition Delivery  Outcome: Ongoing, Progressing     Problem: Skin and Tissue Injury (Mechanical Ventilation, Invasive)  Goal: Absence of Device-Related Skin and Tissue Injury  Outcome: Ongoing, Progressing     Problem: Ventilator-Induced Lung Injury (Mechanical Ventilation, Invasive)  Goal: Absence of Ventilator-Induced Lung Injury  Outcome: Ongoing, Progressing     Problem: Communication Impairment (Artificial Airway)  Goal: Effective Communication  Outcome: Ongoing, Progressing     Problem: Device-Related Complication Risk (Artificial Airway)  Goal: Optimal Device Function  Outcome:  Ongoing, Progressing     Problem: Skin and Tissue Injury (Artificial Airway)  Goal: Absence of Device-Related Skin or Tissue Injury  Outcome: Ongoing, Progressing     Problem: Noninvasive Ventilation Acute  Goal: Effective Unassisted Ventilation and Oxygenation  Outcome: Ongoing, Progressing     Problem: Fall Injury Risk  Goal: Absence of Fall and Fall-Related Injury  Outcome: Ongoing, Progressing     Problem: Skin Injury Risk Increased  Goal: Skin Health and Integrity  Outcome: Ongoing, Progressing     Problem: Infection  Goal: Infection Symptom Resolution  Outcome: Ongoing, Progressing     Problem: Malnutrition  Goal: Improved Nutritional Intake  Outcome: Ongoing, Progressing     Problem: Fluid Imbalance (Pneumonia)  Goal: Fluid Balance  Outcome: Ongoing, Progressing     Problem: Infection (Pneumonia)  Goal: Resolution of Infection Signs/Symptoms  Outcome: Ongoing, Progressing     Problem: Respiratory Compromise (Pneumonia)  Goal: Effective Oxygenation and Ventilation  Outcome: Ongoing, Progressing     Problem: Wound  Goal: Optimal Wound Healing  Outcome: Ongoing, Progressing

## 2020-09-10 NOTE — ANESTHESIA POSTPROCEDURE EVALUATION
Anesthesia Post Evaluation    Patient: Glen Moscoso    Procedure(s) Performed: Procedure(s) (LRB):  SIGMOIDOSCOPY, FLEXIBLE (N/A)    Final Anesthesia Type: general    Patient location during evaluation: PACU  Patient participation: Yes- Able to Participate  Level of consciousness: awake  Post-procedure vital signs: reviewed and stable  Pain management: adequate  Airway patency: patent    PONV status at discharge: No PONV  Anesthetic complications: no      Cardiovascular status: blood pressure returned to baseline and hemodynamically stable  Respiratory status: unassisted, spontaneous ventilation and room air  Hydration status: euvolemic  Follow-up not needed.          Vitals Value Taken Time   /69 09/10/20 0800   Temp 36.8 °C (98.2 °F) 09/10/20 0800   Pulse 103 09/10/20 0800   Resp 18 09/10/20 0800   SpO2 98 % 09/10/20 0800         Event Time   Out of Recovery 12:50:00         Pain/Lulú Score: Pain Rating Prior to Med Admin: 4 (9/9/2020 11:32 PM)  Lulú Score: 10 (9/9/2020 12:49 PM)

## 2020-09-10 NOTE — TELEPHONE ENCOUNTER
Returned call and spoke with Nurse Phillips at ext [phone] - Alan is requesting wound care to patients room as he is a current hospital admit.  Nurse Phillips given several numbers for In-house Wound Care [phone], [phone] to make arrangements for services.  Advised Nurse Phillips that his call was routed incorrectly to out-patient Wound Care Clinic

## 2020-09-10 NOTE — PROGRESS NOTES
[facility]  Infectious Disease  Progress Note    Patient Name: Glen Moscoso  MRN: [id number]  Admission Date: 8/26/2020  Length of Stay: 15 days  Attending Physician: Nohemi Farris MD  Primary Care Provider: Yadi Lawson MD    Isolation Status: No active isolations  Assessment/Plan:      Fever     24 year old male with history of cerebral palsy and seizures who presented 8/26 with encephalopathy, diarrhea and respiratory distress initially requiring intubation. EEG negative for seizures. CXR clear. WBC 22 K on admit.      Blood/urine cx negative. Resp cx grew Pseudomonas and Serratia. He was started on broad spectrum abx and IVF on admit with improvement in mentation and extubated 8/27. ABx deescalated to Cipro on 8/29 and subsequently developed fever.       He was found to have left basilic vein thrombosis around midline and it was removed.      Noted to have bilateral necrotic ischial ulcerations with fluid collection within the lateral aspect of the right lower extremity at the level of the hip, s/p bedside I&D by General Surgery.  No cultures sent.  No exposed bone.  No mention of osteomyelitis on CT abd/pelvis.       Repeat blood cx 9/1 negative.  Repeat resp cx of 9/6  with Serratia (now quinolone R).      GI following for diarrhea,.  C dif negative.  Underwent flex/sig 9/9.  Biopsies pending and report indicated no ABNLs.  No signs of bleeding.   Abdominal US obtained to assess RUQ abdominal pain was negative for acute process.  Stool WBC negative. Stool culture, Stool for O&P, and E coli antigen pending.      Remains afebrile, no leukocytosis, hemodynamically stable.  O2 sat on RA 98%.  Stable non septic     Plan  - Continue IV Zosyn. Recommend 14 days from today given ischial wounds/abscess (though no culture data)  -  Continue aggressive wound care to ischial wounds - previously no sign of cellulitis.   - Need PICC as extended infusion    -  Will sign off    Infection:      Discharge antibiotics:   Zosyn 4.5g extended infusion over 4 hrs every 8 hrs    End date of IV antibiotics: 9/24/19    Weekly outpatient laboratory on Monday or Tuesday while on IV antibiotics.    CBC   CMP or BMP   ESR and CRP      Fax laboratory results to [facility] at [phone] with attn: Lance Granados PA-C [facility]    Outpatient Infectious Diseases clinic follow up will be arranged and found in patient calendar.    Prior to discharge, please ensure the patient's follow-up has been scheduled.  If there is still no follow-up scheduled in Infectious Diseases clinic, please send an EPIC message to Norma Smith in Infectious Diseases.               Decubitus ulcer of left buttock, stage 2     See above    Pneumonia due to Pseudomonas aerginuosa and Serratia marcesens     See above        Anticipated Disposition: tbd    Thank you for your consult. I will sign off. Please contact us if you have any additional questions.    JO Shin  Infectious Disease  [facility]    Subjective:     Principal Problem:Acute respiratory failure with hypoxia    HPI: Glen Moscoso is a 24 year old male with history of cerebral palsy, seizures, and recent admission for hyponatremia who presents with encephalopathy, respiratory distress and possible seizure. He presented to OSH after being found unresponsive and in resp distress. He was found down by his mother upon EMS arrival he had agonal resp 3-4 per min, he was intubated by EMS and take to OSH ED. He was hypotensive on arrival started on Levo gtt, ABG showed metabolic acidosis. WBC 22K. CXR clear. He was admitted to [facility] on 8/26 for a higher level of care, EGG and close monitoring. Started on empiric Vanc and Zosyn and IVF.     On 8/27 he was extubated. Tube feeds, chest physiotherapy were started. Blood cx and urine cx returned negative. Resp cx grew Serratia and Pseudomonas. C diff testing negative.  EEG did not show any epileptiform  activity or discharge. He improved with fluids and abx and returned back to baseline per his mother. Suspect AMS due to dehydration and infection. Leukocytosis resolved. ABx deescalated to Cipro on 8/29. Since then he has been having fevers. He was found to have Left basilic vein thrombosis around midline and it was removed. He completed Cipro on 9/1. Stepped down to the floor yesterday. Given ongoing fevers blood cx repeated and are negative. Sputum cx 8/31 is showing Serratia again. Zosyn started. ID consulted for abx recs. He is on RA. O2 sats in the low 90s. He has multiple necrotic decubitus ulcerations. General surgery consulted. Wound care following.    Of note, he was recently admitted 8/4 - 8/18 for fever, diarrhea and cough. Per brief chart review,  No clear cause of fevers but in the prior two months he has been innudated with antibiotics. Cultures returned negative. Tx with Vanc and Levofloxacin for PNA. Also treated empirically for C diff with oral flagyl and vanc. Symptoms improved prior to discharge. Fever and leukocytosis resolved.  Interval History: No AEON.  Afebrile and WBC WNL.  Blood cultures remain negative.  Abdominal US for RUQ pain negative for abdominal process.  Remains on IV zosyn.  O2 sats 98% on room air.     Review of Systems   Unable to perform ROS: Patient nonverbal     Objective:     Vital Signs (Most Recent):  Temp: 97.9 °F (36.6 °C) (09/10/20 1100)  Pulse: 100 (09/10/20 1320)  Resp: 18 (09/10/20 1320)  BP: (!) 140/62 (09/10/20 1100)  SpO2: 98 % (09/10/20 1320) Vital Signs (24h Range):  Temp:  [97.4 °F (36.3 °C)-98.9 °F (37.2 °C)] 97.9 °F (36.6 °C)  Pulse:  [] 100  Resp:  [18-20] 18  SpO2:  [94 %-100 %] 98 %  BP: ()/(62-86) 140/62     Weight: 33.1 kg (73 lb)  Body mass index is 15.8 kg/m².    Estimated Creatinine Clearance: 90.4 mL/min (based on SCr of 0.6 mg/dL).    Physical Exam  Vitals signs and nursing note reviewed.   Constitutional:       General: He is not in  acute distress.     Appearance: He is not toxic-appearing or diaphoretic.          Comments: Thin, contracted.  No distress.    HENT:      Head: Normocephalic and atraumatic.      Mouth/Throat:      Mouth: Mucous membranes are dry.      Comments: Dry cracked lips  Eyes:      General: No scleral icterus.     Conjunctiva/sclera: Conjunctivae normal.   Cardiovascular:      Rate and Rhythm: Regular rhythm. Tachycardia present.      Heart sounds: Normal heart sounds.   Pulmonary:      Effort: Pulmonary effort is normal. No respiratory distress.      Comments: Course breath sounds bilaterally  On room air.  O2 sats %  Abdominal:      General: There is no distension.      Palpations: Abdomen is soft.      Tenderness: There is no abdominal tenderness.      Comments: PEG site c/di.    Musculoskeletal:      Right lower leg: No edema.      Left lower leg: No edema.      Comments: Contractures upper and lower extremities     Skin:     Findings: No rash.      Comments: Necrotic ischial decubitus ulcerations - not examined.  Photos reviewed - see below.    Neurological:      Mental Status: He is alert.      Comments: Alert       9/4                Significant Labs:   Blood Culture:   Recent Labs   Lab 08/08/20  1112 08/26/20  0305 08/26/20  0318 09/01/20  0043 09/01/20  0057   LABBLOO No growth after 5 days.  No growth after 5 days. No growth after 5 days. No growth after 5 days. No growth after 5 days. No growth after 5 days.     CBC:   Recent Labs   Lab 09/09/20  0650 09/10/20  0341   WBC 10.47 8.05   HGB 10.8* 10.1*   HCT 35.8* 32.2*   * 888*     CMP:   Recent Labs   Lab 09/09/20  0650 09/10/20  0341    144   K 4.1 3.8    111*   CO2 24 24   GLU 79 113*   BUN 8 10   CREATININE 0.6 0.6   CALCIUM 9.0 8.6*   PROT 7.8 6.6   ALBUMIN 2.3* 1.9*   BILITOT 0.4 0.2   ALKPHOS 175* 135   AST 24 15   ALT 24 18   ANIONGAP 11 9   EGFRNONAA >60.0 >60.0     Respiratory Culture:   Recent Labs   Lab 08/26/20  050  08/31/20  2157 09/06/20  1103   GSRESP <10 epithelial cells per low power field.  Moderate WBC's  Many Gram negative rods  Many Gram negative diplococci <10 epithelial cells per low power field.  Rare WBC's  Rare Gram negative rods >10 epithelial cells per low power field  Rare WBC's  Rare Gram negative rods   RESPIRATORYC No S aureus isolated.  SERRATIA MARCESCENS  Moderate  Normal respiratory jose also present  *  PSEUDOMONAS AERUGINOSA  Moderate  * No S aureus or Pseudomonas isolated.  SERRATIA MARCESCENS  Moderate  * No S aureus or Pseudomonas isolated.  SERRATIA MARCESCENS  Moderate  *     Urine Culture:   Recent Labs   Lab 08/26/20  0915   LABURIN No significant growth     Urine Studies:   Recent Labs   Lab 08/09/20  0555 08/26/20  0915   COLORU Yellow Vane   APPEARANCEUA Hazy* Cloudy*   PHUR 6.0 5.0   SPECGRAV >=1.030* 1.025   PROTEINUA 1+* 1+*   GLUCUA Negative Negative   KETONESU Negative Negative   BILIRUBINUA Negative Negative   OCCULTUA 3+* 2+*   NITRITE Negative Negative   UROBILINOGEN Negative  --    LEUKOCYTESUR Negative Trace*   RBCUA 40* 13*   WBCUA 8* 18*   BACTERIA Occasional Occasional   SQUAMEPITHEL  --  1   HYALINECASTS 2* 7*     Wound Culture: No results for input(s): LABAERO in the last 4320 hours.  All pertinent labs within the past 24 hours have been reviewed.    Significant Imaging: I have reviewed all pertinent imaging results/findings within the past 24 hours.   US Lower Extremity Veins Bilateral [[id number]] Resulted: 09/01/20 1204   Order Status: Completed Updated: 09/01/20 1206   Narrative:     EXAMINATION:   US LOWER EXTREMITY VEINS BILATERAL     CLINICAL HISTORY:   r/o DVTs;     TECHNIQUE:   Duplex and color flow Doppler and dynamic compression was performed of the bilateral lower extremity veins was performed.     COMPARISON:   None     FINDINGS:   Right thigh veins: The common femoral, femoral, popliteal, upper greater saphenous, and deep femoral veins are patent and  free of thrombus. The veins are normally compressible and have normal phasic flow and augmentation response.     Right calf veins: The visualized calf veins are patent.     Left thigh veins: The common femoral, femoral, popliteal, upper greater saphenous, and deep femoral veins are patent and free of thrombus. The veins are normally compressible and have normal phasic flow and augmentation response.     Left calf veins: The visualized calf veins are patent.     There is a heterogeneous collection within the right lateral leg measuring 3.8 x 1.7 x 3.0 cm.  No drainable collection.    Impression:       No evidence of deep venous thrombosis in either lower extremity.     Heterogeneous collection within the lateral aspect of the right lower extremity at the level of the hip, which may represent a small hematoma.  No focal drainable fluid collection.     Electronically signed by resident: Erika Baxter   Date: 09/01/2020   Time: 11:57     Electronically signed by: Reji Ray MD   Date: 09/01/2020   Time: 12:04   US Upper Extremity Veins Right [[id number]] (Abnormal) Resulted: 09/01/20 1202   Order Status: Completed Updated: 09/01/20 1205   Narrative:     EXAMINATION:   US UPPER EXTREMITY VEINS LEFT; US UPPER EXTREMITY VEINS RIGHT     CLINICAL HISTORY:   r/o dvts;; ? dvt;     TECHNIQUE:   Duplex and color flow Doppler evaluation and dynamic compression was performed of the right and left upper extremity veins.     COMPARISON:   None     FINDINGS:   Right central veins: The internal jugular, subclavian, and axillary veins are patent and free of thrombus.     Right arm veins: The brachial, and basilic veins are patent and compressible.  The cephalic vein is not visualized and occlusion is not entirely excluded.     Left central veins: The internal jugular, subclavian, and axillary veins are patent and free of thrombus.     Left arm veins: The brachial and cephalic veins are patent and compressible, noting that there is  partial obscuration by overlying bandage.     There is a peripheral venous catheter within the left basilic vein.  There is surrounding occlusive thrombus and expansion of the vein.     Miscellaneous: N/A    Impression:       Thrombosis of the left basilic vein surrounding a peripheral venous catheter with expansion of the vein.     The right cephalic vein is not visualized and occlusion is not entirely excluded.     No other thrombus is visualized.     This report was flagged in Epic as abnormal.     Electronically signed by resident: Erika Baxter   Date: 09/01/2020   Time: 11:49     Electronically signed by: Reji Ray MD   Date: 09/01/2020   Time: 12:02   US Upper Extremity Veins Left [[id number]] (Abnormal) Resulted: 09/01/20 1202   Order Status: Completed Updated: 09/01/20 1205   Narrative:     EXAMINATION:   US UPPER EXTREMITY VEINS LEFT; US UPPER EXTREMITY VEINS RIGHT     CLINICAL HISTORY:   r/o dvts;; ? dvt;     TECHNIQUE:   Duplex and color flow Doppler evaluation and dynamic compression was performed of the right and left upper extremity veins.     COMPARISON:   None     FINDINGS:   Right central veins: The internal jugular, subclavian, and axillary veins are patent and free of thrombus.     Right arm veins: The brachial, and basilic veins are patent and compressible.  The cephalic vein is not visualized and occlusion is not entirely excluded.     Left central veins: The internal jugular, subclavian, and axillary veins are patent and free of thrombus.     Left arm veins: The brachial and cephalic veins are patent and compressible, noting that there is partial obscuration by overlying bandage.     There is a peripheral venous catheter within the left basilic vein.  There is surrounding occlusive thrombus and expansion of the vein.     Miscellaneous: N/A    Impression:       Thrombosis of the left basilic vein surrounding a peripheral venous catheter with expansion of the vein.     The right cephalic  vein is not visualized and occlusion is not entirely excluded.     No other thrombus is visualized.     This report was flagged in Epic as abnormal.     Electronically signed by resident: Erika Baxter   Date: 09/01/2020   Time: 11:49     Electronically signed by: Reji Ray MD   Date: 09/01/2020   Time: 12:02   X-Ray Chest 1 View [[id number]] Resulted: 09/01/20 0959   Order Status: Completed Updated: 09/01/20 1002   Narrative:     EXAMINATION:   XR CHEST 1 VIEW     CLINICAL HISTORY:   eval pneumonia;     TECHNIQUE:   Single frontal view of the chest was performed.     COMPARISON:   Chest radiographs: 08/30/2020.  08/28/2020.  08/26/2020.     Chest CTA: 08/05/2020.     FINDINGS:   Mediastinal structures are midline.  Mediastinal contours are stable.  Cardiac silhouette and pulmonary vascular distribution are normal.     Lung volumes are normal and symmetric. I detect no pulmonary disease, pleural fluid, lymph node enlargement, cardiac decompensation, pneumothorax, pneumomediastinum, pneumoperitoneum or significant osseous abnormality.    Impression:       No pneumonia or other acute disease identified.       Electronically signed by: Janett Rosales MD   Date: 09/01/2020   Time: 09:59   X-Ray Chest 1 View [[id number]] Resulted: 08/30/20 1143   Order Status: Completed Updated: 08/30/20 1146   Narrative:     EXAMINATION:   XR CHEST 1 VIEW     CLINICAL HISTORY:   Pneumonia.     TECHNIQUE:   Single frontal view of the chest was performed.     COMPARISON:   Multiple prior radiographs of the chest, most recent from 08/28/2020.     FINDINGS:   The lungs are well expanded and clear. No focal opacities are seen. The pleural spaces are clear.  The cardiac silhouette is unremarkable.  The visualized osseous structures are unremarkable.    Impression:       No acute cardiopulmonary abnormality.       Electronically signed by: Sanford James   Date: 08/30/2020   Time: 11:43   X-Ray Chest AP Portable [[id number]] Resulted:  08/28/20 1353   Order Status: Completed Updated: 08/28/20 1356   Narrative:     EXAMINATION:   XR CHEST AP PORTABLE     CLINICAL HISTORY:   cough;     TECHNIQUE:   Single frontal view of the chest was performed.     COMPARISON:   Chest radiograph: 08/27/2020.     Chest CT: 08/05/2020.     FINDINGS:   Interval extubation and removal of NG tube.     Mediastinal structures are midline. Cardiac silhouette and pulmonary vascular distribution are normal.     Lung volumes are normal and symmetric. I detect no pulmonary disease, pleural fluid, lymph node enlargement, cardiac decompensation, pneumothorax, pneumomediastinum, pneumoperitoneum or significant osseous abnormality.    Impression:       No convincing evidence of disease.  No source for cough identified.       Electronically signed by: Janett Rosales MD   Date: 08/28/2020   Time: 13:53   Imaging History    2020  Date Procedure Name Status Accession Number Location   09/01/20 11:47 AM US Lower Extremity Veins Bilateral Final [id number] [facility]   09/01/20 11:46 AM US Upper Extremity Veins Right Final [id number] [facility]   09/01/20 11:46 AM US Upper Extremity Veins Left Final [id number] [facility]   09/01/20 09:54 AM X-Ray Chest 1 View Final [id number] [facility]   08/30/20 11:25 AM X-Ray Chest 1 View Final [id number] [facility]   08/28/20 01:50 PM X-Ray Chest AP Portable Final [id number] [facility]   08/27/20 04:42 AM X-Ray Chest 1 View Final [id number] [facility]   08/26/20 10:30 AM X-Ray Abdomen AP 1 View (KUB) Final [id number] [facility]   08/26/20 08:28 AM X-Ray Abdomen AP 1 View (KUB) Final [id number] [facility]   08/26/20 03:30 AM X-Ray Chest 1 View Final [id number] [facility]   08/14/20 11:20 AM X-Ray Chest 1 View Final [id number] \[facility]\""   08/11/20 01:44 PM CT Abdomen Pelvis With Contrast Final [id number] \[facility]\""   08/09/20 07:38 AM X-Ray Chest 1 View Final [id number] \[facility]\""   08/07/20 10:14 AM X-Ray Chest 1 View Final [id number] \[facility]\""   08/05/20 09:15 AM CTA Chest Non-Coronary Final [id number] \[facility]\""   08/04/20 12:56 PM X-Ray Chest AP  Portable Final [id number] [facility]   08/04/20 12:00 AM CARDIAC MONITORING STRIPS Final     08/04/20 12:00 AM CARDIAC MONITORING STRIPS Final     08/26/20 11:20 AM Echo Color Flow Doppler? Yes Final [id number] [facility]   08/10/20 01:52 PM Echo Color Flow Doppler? Yes; Bubble Contrast? No Final [id number] [facility]

## 2020-09-10 NOTE — TELEPHONE ENCOUNTER
----- Message from Jocelyn Joseph sent at 9/10/2020 12:50 PM CDT -----  Contact: Nurse Alan ward is asking for a call back in regards to request for plans of wound care and notes that are left in regards to the pt     Contact info- [phone]

## 2020-09-10 NOTE — PLAN OF CARE
09/10/20 1442   Discharge Reassessment   Assessment Type Discharge Planning Reassessment   Provided patient/caregiver education on the expected discharge date and the discharge plan No   Do you have any problems affording any of your prescribed medications? No   Discharge Plan A Home with family   Discharge Plan B Home with family   Patient choice form signed by patient/caregiver N/A   Anticipated Discharge Disposition Home   Can the patient/caregiver answer the patient profile reliably? No, cognitively impaired   Post-Acute Status   Discharge Delays None known at this time

## 2020-09-10 NOTE — PROGRESS NOTES
Wound care follow up:     Unstageable wounds to bilateral ischial improving with red granulated tissue now visible and an increase in slough debrided from both wound beds. No odor. Patient complained of discomfort during dressing change. Wound to scrotum has resolved. Nursing to continue with orders in place: Triad ointment BID/PRN after cleaning. GRAY surface in use.     Nursing notified with recommendations.   Wound care to continue to follow pt PRN [id number]        Right ischial: 6 x 4.5 x 1.5 cm w/ 2 cm undermining at 7-9 o'clock   Left ischial: 5 x 4.5 x 1.5 cm

## 2020-09-10 NOTE — PROGRESS NOTES
[facility]  Infectious Disease  Progress Note    Patient Name: Glen Moscoso  MRN: [id number]  Admission Date: 8/26/2020  Length of Stay: 14 days  Attending Physician: Quiana Armenta MD  Primary Care Provider: Yadi Lawson MD    Isolation Status: No active isolations  Assessment/Plan:      Fever     24 year old male with history of cerebral palsy and seizures who presented 8/26 with encephalopathy, diarrhea and respiratory distress initially requiring intubation. EEG negative for seizures. CXR clear. WBC 22 K on admit.      Blood/urine cx negative. Resp cx grew Pseudomonas and Serratia. He was started on broad spectrum abx and IVF on admit with improvement in mentation and extubated 8/27. ABx deescalated to Cipro on 8/29 and subsequently developed fever.       He was found to have left basilic vein thrombosis around midline and it was removed.      Noted to have bilateral necrotic ischial ulcerations with fluid collection within the lateral aspect of the right lower extremity at the level of the hip, s/p bedside I&D by General Surgery.  No cultures sent.  No exposed bone.  No mention of osteomyelitis on CT abd/pelvis.       Repeat blood cx 9/1 negative.  Repeat resp cx of 9/6  with Serratia (now quinolone R).      GI following for diarrhea,.  C dif negative.  Underwent flex/sig today.  Biopsies pending.  No signs of bleeding.   Abdominal US obtained to assess RUQ abdominal pain was negative for acute process.  Stool WBC negative. Stool culture, Stool for O&P, and E coli antigen pending.      Remains afebrile, no leukocytosis, hemodynamically stable.     Plan  - Continue IV Zosyn. Recommend 14 days today given ischial wounds/abscess (though no culture data)  -  Continue aggressive wound care to ischial wounds - previously no sign of cellulitis.    -  Will follow up GI cultures/studies and follow up tomorrow with final recommendations.     Decubitus ulcer of left buttock, stage 2     See  above    Pneumonia due to Pseudomonas aerginuosa and Serratia marcesens     See above    Thank you.   Please call for any questions or concerns.  NANI Romo, ANP-C  [phone] pager, [place] [phone]    Subjective:     Principal Problem:Acute respiratory failure with hypoxia    HPI: Glen Moscoso is a 24 year old male with history of cerebral palsy, seizures, and recent admission for hyponatremia who presents with encephalopathy, respiratory distress and possible seizure. He presented to OSH after being found unresponsive and in resp distress. He was found down by his mother upon EMS arrival he had agonal resp 3-4 per min, he was intubated by EMS and take to OSH ED. He was hypotensive on arrival started on Levo gtt, ABG showed metabolic acidosis. WBC 22K. CXR clear. He was admitted to [facility] on 8/26 for a higher level of care, EGG and close monitoring. Started on empiric Vanc and Zosyn and IVF.     On 8/27 he was extubated. Tube feeds, chest physiotherapy were started. Blood cx and urine cx returned negative. Resp cx grew Serratia and Pseudomonas. C diff testing negative.  EEG did not show any epileptiform activity or discharge. He improved with fluids and abx and returned back to baseline per his mother. Suspect AMS due to dehydration and infection. Leukocytosis resolved. ABx deescalated to Cipro on 8/29. Since then he has been having fevers. He was found to have Left basilic vein thrombosis around midline and it was removed. He completed Cipro on 9/1. Stepped down to the floor yesterday. Given ongoing fevers blood cx repeated and are negative. Sputum cx 8/31 is showing Serratia again. Zosyn started. ID consulted for abx recs. He is on RA. O2 sats in the low 90s. He has multiple necrotic decubitus ulcerations. General surgery consulted. Wound care following.    Of note, he was recently admitted 8/4 - 8/18 for fever, diarrhea and cough. Per brief chart review,  No clear cause of fevers but in the prior two months he has  been innudated with antibiotics. Cultures returned negative. Tx with Vanc and Levofloxacin for PNA. Also treated empirically for C diff with oral flagyl and vanc. Symptoms improved prior to discharge. Fever and leukocytosis resolved.  Interval History: No AEON.  Afebrile and WBC WNL.  Blood cultures remain negative.  Abdominal US for RUQ pain negative for abdominal process.  Remains on IV zosyn.  O2 sats % on room air.     Review of Systems   Unable to perform ROS: Patient nonverbal     Objective:     Vital Signs (Most Recent):  Temp: 98.3 °F (36.8 °C) (09/09/20 2044)  Pulse: 99 (09/09/20 2044)  Resp: 18 (09/09/20 2044)  BP: 93/62 (09/09/20 2044)  SpO2: 98 % (09/09/20 2044) Vital Signs (24h Range):  Temp:  [97.9 °F (36.6 °C)-99.3 °F (37.4 °C)] 98.3 °F (36.8 °C)  Pulse:  [] 99  Resp:  [16-20] 18  SpO2:  [92 %-100 %] 98 %  BP: ()/(62-86) 93/62     Weight: 33.1 kg (73 lb)  Body mass index is 15.8 kg/m².    Estimated Creatinine Clearance: 90.4 mL/min (based on SCr of 0.6 mg/dL).    Physical Exam  Vitals signs and nursing note reviewed.   Constitutional:       General: He is not in acute distress.     Appearance: He is not toxic-appearing or diaphoretic.          Comments: Thin, contracted.  No distress.    HENT:      Head: Normocephalic and atraumatic.      Mouth/Throat:      Mouth: Mucous membranes are dry.      Comments: Dry cracked lips  Eyes:      General: No scleral icterus.     Conjunctiva/sclera: Conjunctivae normal.   Cardiovascular:      Rate and Rhythm: Regular rhythm. Tachycardia present.      Heart sounds: Normal heart sounds.   Pulmonary:      Effort: Pulmonary effort is normal. No respiratory distress.      Comments: Course breath sounds bilaterally  On room air.  O2 sats %  Abdominal:      General: There is no distension.      Palpations: Abdomen is soft.      Tenderness: There is no abdominal tenderness.      Comments: PEG site c/di.    Musculoskeletal:      Right lower leg: No  edema.      Left lower leg: No edema.      Comments: Contractures upper and lower extremities     Skin:     Findings: No rash.      Comments: Necrotic ischial decubitus ulcerations - not examined.  Photos reviewed - see below.    Neurological:      Mental Status: He is alert.      Comments: Alert       9/4                Significant Labs:   Blood Culture:   Recent Labs   Lab 08/08/20  1112 08/26/20  0305 08/26/20  0318 09/01/20  0043 09/01/20  0057   LABBLOO No growth after 5 days.  No growth after 5 days. No growth after 5 days. No growth after 5 days. No growth after 5 days. No growth after 5 days.     CBC:   Recent Labs   Lab 09/08/20  0558 09/09/20  0650   WBC 6.10 10.47   HGB 6.9* 10.8*   HCT 23.3* 35.8*   * 893*     CMP:   Recent Labs   Lab 09/08/20  0558 09/09/20  0650    140   K 3.6 4.1    105   CO2 26 24   * 79   BUN 8 8   CREATININE 0.6 0.6   CALCIUM 8.1* 9.0   PROT 6.3 7.8   ALBUMIN 1.8* 2.3*   BILITOT 0.1 0.4   ALKPHOS 159* 175*   AST 23 24   ALT 20 24   ANIONGAP 6* 11   EGFRNONAA >60.0 >60.0     Respiratory Culture:   Recent Labs   Lab 08/26/20  0505 08/31/20  2157 09/06/20  1103   GSRESP <10 epithelial cells per low power field.  Moderate WBC's  Many Gram negative rods  Many Gram negative diplococci <10 epithelial cells per low power field.  Rare WBC's  Rare Gram negative rods >10 epithelial cells per low power field  Rare WBC's  Rare Gram negative rods   RESPIRATORYC No S aureus isolated.  SERRATIA MARCESCENS  Moderate  Normal respiratory jose also present  *  PSEUDOMONAS AERUGINOSA  Moderate  * No S aureus or Pseudomonas isolated.  SERRATIA MARCESCENS  Moderate  * No S aureus or Pseudomonas isolated.  SERRATIA MARCESCENS  Moderate  *     Urine Culture:   Recent Labs   Lab 08/26/20  0915   LABURIN No significant growth     Urine Studies:   Recent Labs   Lab 08/09/20  0555 08/26/20  0915   COLORU Yellow Vane   APPEARANCEUA Hazy* Cloudy*   PHUR 6.0 5.0   SPECGRAV  >=1.030* 1.025   PROTEINUA 1+* 1+*   GLUCUA Negative Negative   KETONESU Negative Negative   BILIRUBINUA Negative Negative   OCCULTUA 3+* 2+*   NITRITE Negative Negative   UROBILINOGEN Negative  --    LEUKOCYTESUR Negative Trace*   RBCUA 40* 13*   WBCUA 8* 18*   BACTERIA Occasional Occasional   SQUAMEPITHEL  --  1   HYALINECASTS 2* 7*     Wound Culture: No results for input(s): LABAERO in the last 4320 hours.  All pertinent labs within the past 24 hours have been reviewed.    Significant Imaging: I have reviewed all pertinent imaging results/findings within the past 24 hours.   US Lower Extremity Veins Bilateral [[id number]] Resulted: 09/01/20 1204   Order Status: Completed Updated: 09/01/20 1206   Narrative:     EXAMINATION:   US LOWER EXTREMITY VEINS BILATERAL     CLINICAL HISTORY:   r/o DVTs;     TECHNIQUE:   Duplex and color flow Doppler and dynamic compression was performed of the bilateral lower extremity veins was performed.     COMPARISON:   None     FINDINGS:   Right thigh veins: The common femoral, femoral, popliteal, upper greater saphenous, and deep femoral veins are patent and free of thrombus. The veins are normally compressible and have normal phasic flow and augmentation response.     Right calf veins: The visualized calf veins are patent.     Left thigh veins: The common femoral, femoral, popliteal, upper greater saphenous, and deep femoral veins are patent and free of thrombus. The veins are normally compressible and have normal phasic flow and augmentation response.     Left calf veins: The visualized calf veins are patent.     There is a heterogeneous collection within the right lateral leg measuring 3.8 x 1.7 x 3.0 cm.  No drainable collection.    Impression:       No evidence of deep venous thrombosis in either lower extremity.     Heterogeneous collection within the lateral aspect of the right lower extremity at the level of the hip, which may represent a small hematoma.  No focal drainable  fluid collection.     Electronically signed by resident: Erika Baxter   Date: 09/01/2020   Time: 11:57     Electronically signed by: Reji Ray MD   Date: 09/01/2020   Time: 12:04   US Upper Extremity Veins Right [[id number]] (Abnormal) Resulted: 09/01/20 1202   Order Status: Completed Updated: 09/01/20 1205   Narrative:     EXAMINATION:   US UPPER EXTREMITY VEINS LEFT; US UPPER EXTREMITY VEINS RIGHT     CLINICAL HISTORY:   r/o dvts;; ? dvt;     TECHNIQUE:   Duplex and color flow Doppler evaluation and dynamic compression was performed of the right and left upper extremity veins.     COMPARISON:   None     FINDINGS:   Right central veins: The internal jugular, subclavian, and axillary veins are patent and free of thrombus.     Right arm veins: The brachial, and basilic veins are patent and compressible.  The cephalic vein is not visualized and occlusion is not entirely excluded.     Left central veins: The internal jugular, subclavian, and axillary veins are patent and free of thrombus.     Left arm veins: The brachial and cephalic veins are patent and compressible, noting that there is partial obscuration by overlying bandage.     There is a peripheral venous catheter within the left basilic vein.  There is surrounding occlusive thrombus and expansion of the vein.     Miscellaneous: N/A    Impression:       Thrombosis of the left basilic vein surrounding a peripheral venous catheter with expansion of the vein.     The right cephalic vein is not visualized and occlusion is not entirely excluded.     No other thrombus is visualized.     This report was flagged in Epic as abnormal.     Electronically signed by resident: Erika Baxter   Date: 09/01/2020   Time: 11:49     Electronically signed by: Reji Ray MD   Date: 09/01/2020   Time: 12:02   US Upper Extremity Veins Left [[id number]] (Abnormal) Resulted: 09/01/20 1202   Order Status: Completed Updated: 09/01/20 1205   Narrative:     EXAMINATION:   US  UPPER EXTREMITY VEINS LEFT; US UPPER EXTREMITY VEINS RIGHT     CLINICAL HISTORY:   r/o dvts;; ? dvt;     TECHNIQUE:   Duplex and color flow Doppler evaluation and dynamic compression was performed of the right and left upper extremity veins.     COMPARISON:   None     FINDINGS:   Right central veins: The internal jugular, subclavian, and axillary veins are patent and free of thrombus.     Right arm veins: The brachial, and basilic veins are patent and compressible.  The cephalic vein is not visualized and occlusion is not entirely excluded.     Left central veins: The internal jugular, subclavian, and axillary veins are patent and free of thrombus.     Left arm veins: The brachial and cephalic veins are patent and compressible, noting that there is partial obscuration by overlying bandage.     There is a peripheral venous catheter within the left basilic vein.  There is surrounding occlusive thrombus and expansion of the vein.     Miscellaneous: N/A    Impression:       Thrombosis of the left basilic vein surrounding a peripheral venous catheter with expansion of the vein.     The right cephalic vein is not visualized and occlusion is not entirely excluded.     No other thrombus is visualized.     This report was flagged in Epic as abnormal.     Electronically signed by resident: Erika Baxter   Date: 09/01/2020   Time: 11:49     Electronically signed by: Reji Ray MD   Date: 09/01/2020   Time: 12:02   X-Ray Chest 1 View [[id number]] Resulted: 09/01/20 0959   Order Status: Completed Updated: 09/01/20 1002   Narrative:     EXAMINATION:   XR CHEST 1 VIEW     CLINICAL HISTORY:   eval pneumonia;     TECHNIQUE:   Single frontal view of the chest was performed.     COMPARISON:   Chest radiographs: 08/30/2020.  08/28/2020.  08/26/2020.     Chest CTA: 08/05/2020.     FINDINGS:   Mediastinal structures are midline.  Mediastinal contours are stable.  Cardiac silhouette and pulmonary vascular distribution are normal.      Lung volumes are normal and symmetric. I detect no pulmonary disease, pleural fluid, lymph node enlargement, cardiac decompensation, pneumothorax, pneumomediastinum, pneumoperitoneum or significant osseous abnormality.    Impression:       No pneumonia or other acute disease identified.       Electronically signed by: Janett Rosales MD   Date: 09/01/2020   Time: 09:59   X-Ray Chest 1 View [[id number]] Resulted: 08/30/20 1143   Order Status: Completed Updated: 08/30/20 1146   Narrative:     EXAMINATION:   XR CHEST 1 VIEW     CLINICAL HISTORY:   Pneumonia.     TECHNIQUE:   Single frontal view of the chest was performed.     COMPARISON:   Multiple prior radiographs of the chest, most recent from 08/28/2020.     FINDINGS:   The lungs are well expanded and clear. No focal opacities are seen. The pleural spaces are clear.  The cardiac silhouette is unremarkable.  The visualized osseous structures are unremarkable.    Impression:       No acute cardiopulmonary abnormality.       Electronically signed by: Sanford James   Date: 08/30/2020   Time: 11:43   X-Ray Chest AP Portable [[id number]] Resulted: 08/28/20 1353   Order Status: Completed Updated: 08/28/20 1356   Narrative:     EXAMINATION:   XR CHEST AP PORTABLE     CLINICAL HISTORY:   cough;     TECHNIQUE:   Single frontal view of the chest was performed.     COMPARISON:   Chest radiograph: 08/27/2020.     Chest CT: 08/05/2020.     FINDINGS:   Interval extubation and removal of NG tube.     Mediastinal structures are midline. Cardiac silhouette and pulmonary vascular distribution are normal.     Lung volumes are normal and symmetric. I detect no pulmonary disease, pleural fluid, lymph node enlargement, cardiac decompensation, pneumothorax, pneumomediastinum, pneumoperitoneum or significant osseous abnormality.    Impression:       No convincing evidence of disease.  No source for cough identified.       Electronically signed by: Janett Rosales MD   Date: 08/28/2020    Time: 13:53   Imaging History    2020  Date Procedure Name Status Accession Number Location   09/01/20 11:47 AM US Lower Extremity Veins Bilateral Final [id number] [facility]   09/01/20 11:46 AM US Upper Extremity Veins Right Final [id number] [facility]   09/01/20 11:46 AM US Upper Extremity Veins Left Final [id number] [facility]   09/01/20 09:54 AM X-Ray Chest 1 View Final [id number] [facility]   08/30/20 11:25 AM X-Ray Chest 1 View Final [id number] [facility]   08/28/20 01:50 PM X-Ray Chest AP Portable Final [id number] [facility]   08/27/20 04:42 AM X-Ray Chest 1 View Final [id number] [facility]   08/26/20 10:30 AM X-Ray Abdomen AP 1 View (KUB) Final [id number] [facility]   08/26/20 08:28 AM X-Ray Abdomen AP 1 View (KUB) Final [id number] [facility]   08/26/20 03:30 AM X-Ray Chest 1 View Final [id number] [facility]   08/14/20 11:20 AM X-Ray Chest 1 View Final [id number] [facility]   08/11/20 01:44 PM CT Abdomen Pelvis With Contrast Final [id number] [facility]   08/09/20 07:38 AM X-Ray Chest 1 View Final [id number] [facility]   08/07/20 10:14 AM X-Ray Chest 1 View Final [id number] [facility]   08/05/20 09:15 AM CTA Chest Non-Coronary Final [id number] [facility]   08/04/20 12:56 PM X-Ray Chest AP Portable Final [id number] [facility]   08/04/20 12:00 AM CARDIAC MONITORING STRIPS Final     08/04/20 12:00 AM CARDIAC MONITORING STRIPS Final     08/26/20 11:20 AM Echo Color Flow Doppler? Yes Final [id number] [facility]   08/10/20 01:52 PM Echo Color Flow Doppler? Yes; Bubble Contrast? No Final [id number] [facility]

## 2020-09-11 ENCOUNTER — TELEPHONE (OUTPATIENT)
Dept: GASTROENTEROLOGY | Facility: CLINIC | Age: 25
End: 2020-09-11

## 2020-09-11 PROBLEM — T14.8XXA DEEP TISSUE INJURY: Status: RESOLVED | Noted: 2020-08-26 | Resolved: 2020-09-11

## 2020-09-11 LAB
ALBUMIN SERPL BCP-MCNC: 1.9 G/DL (ref 3.5–5.2)
ALP SERPL-CCNC: 109 U/L (ref 55–135)
ALT SERPL W/O P-5'-P-CCNC: 13 U/L (ref 10–44)
ANION GAP SERPL CALC-SCNC: 9 MMOL/L (ref 8–16)
AST SERPL-CCNC: 14 U/L (ref 10–40)
BASOPHILS # BLD AUTO: 0.03 K/UL (ref 0–0.2)
BASOPHILS NFR BLD: 0.4 % (ref 0–1.9)
BILIRUB SERPL-MCNC: 0.2 MG/DL (ref 0.1–1)
BUN SERPL-MCNC: 8 MG/DL (ref 6–20)
CALCIUM SERPL-MCNC: 8.3 MG/DL (ref 8.7–10.5)
CHLORIDE SERPL-SCNC: 110 MMOL/L (ref 95–110)
CO2 SERPL-SCNC: 23 MMOL/L (ref 23–29)
CREAT SERPL-MCNC: 0.5 MG/DL (ref 0.5–1.4)
DIFFERENTIAL METHOD: ABNORMAL
EOSINOPHIL # BLD AUTO: 0.2 K/UL (ref 0–0.5)
EOSINOPHIL NFR BLD: 2.9 % (ref 0–8)
ERYTHROCYTE [DISTWIDTH] IN BLOOD BY AUTOMATED COUNT: 17.5 % (ref 11.5–14.5)
EST. GFR  (AFRICAN AMERICAN): >60 ML/MIN/1.73 M^2
EST. GFR  (NON AFRICAN AMERICAN): >60 ML/MIN/1.73 M^2
GLUCOSE SERPL-MCNC: 112 MG/DL (ref 70–110)
HCT VFR BLD AUTO: 29.9 % (ref 40–54)
HGB BLD-MCNC: 8.9 G/DL (ref 14–18)
IMM GRANULOCYTES # BLD AUTO: 0.04 K/UL (ref 0–0.04)
IMM GRANULOCYTES NFR BLD AUTO: 0.5 % (ref 0–0.5)
LYMPHOCYTES # BLD AUTO: 1.1 K/UL (ref 1–4.8)
LYMPHOCYTES NFR BLD: 14.1 % (ref 18–48)
MAGNESIUM SERPL-MCNC: 1.7 MG/DL (ref 1.6–2.6)
MCH RBC QN AUTO: 29.6 PG (ref 27–31)
MCHC RBC AUTO-ENTMCNC: 29.8 G/DL (ref 32–36)
MCV RBC AUTO: 99 FL (ref 82–98)
MONOCYTES # BLD AUTO: 1.1 K/UL (ref 0.3–1)
MONOCYTES NFR BLD: 14.7 % (ref 4–15)
NEUTROPHILS # BLD AUTO: 5.2 K/UL (ref 1.8–7.7)
NEUTROPHILS NFR BLD: 67.4 % (ref 38–73)
NRBC BLD-RTO: 0 /100 WBC
PHOSPHATE SERPL-MCNC: 3.1 MG/DL (ref 2.7–4.5)
PLATELET # BLD AUTO: 856 K/UL (ref 150–350)
PMV BLD AUTO: 9.1 FL (ref 9.2–12.9)
POTASSIUM SERPL-SCNC: 3.8 MMOL/L (ref 3.5–5.1)
PROT SERPL-MCNC: 6.3 G/DL (ref 6–8.4)
RBC # BLD AUTO: 3.01 M/UL (ref 4.6–6.2)
SODIUM SERPL-SCNC: 142 MMOL/L (ref 136–145)
WBC # BLD AUTO: 7.71 K/UL (ref 3.9–12.7)

## 2020-09-11 PROCEDURE — 99900035 HC TECH TIME PER 15 MIN (STAT)

## 2020-09-11 PROCEDURE — 11000001 HC ACUTE MED/SURG PRIVATE ROOM

## 2020-09-11 PROCEDURE — 63600175 PHARM REV CODE 636 W HCPCS: Performed by: HOSPITALIST

## 2020-09-11 PROCEDURE — 25000003 PHARM REV CODE 250: Performed by: HOSPITALIST

## 2020-09-11 PROCEDURE — 83735 ASSAY OF MAGNESIUM: CPT

## 2020-09-11 PROCEDURE — 25000003 PHARM REV CODE 250: Performed by: STUDENT IN AN ORGANIZED HEALTH CARE EDUCATION/TRAINING PROGRAM

## 2020-09-11 PROCEDURE — 94761 N-INVAS EAR/PLS OXIMETRY MLT: CPT

## 2020-09-11 PROCEDURE — 25000003 PHARM REV CODE 250: Performed by: INTERNAL MEDICINE

## 2020-09-11 PROCEDURE — 99232 SBSQ HOSP IP/OBS MODERATE 35: CPT | Mod: ,,, | Performed by: INTERNAL MEDICINE

## 2020-09-11 PROCEDURE — 99900026 HC AIRWAY MAINTENANCE (STAT)

## 2020-09-11 PROCEDURE — 85025 COMPLETE CBC W/AUTO DIFF WBC: CPT

## 2020-09-11 PROCEDURE — 25000003 PHARM REV CODE 250: Performed by: PHYSICIAN ASSISTANT

## 2020-09-11 PROCEDURE — 94668 MNPJ CHEST WALL SBSQ: CPT

## 2020-09-11 PROCEDURE — 99232 PR SUBSEQUENT HOSPITAL CARE,LEVL II: ICD-10-PCS | Mod: ,,, | Performed by: INTERNAL MEDICINE

## 2020-09-11 PROCEDURE — 84100 ASSAY OF PHOSPHORUS: CPT

## 2020-09-11 PROCEDURE — 80053 COMPREHEN METABOLIC PANEL: CPT

## 2020-09-11 PROCEDURE — 36415 COLL VENOUS BLD VENIPUNCTURE: CPT

## 2020-09-11 PROCEDURE — 31720 CLEARANCE OF AIRWAYS: CPT

## 2020-09-11 RX ORDER — LOPERAMIDE HCL 1MG/7.5ML
2 LIQUID (ML) ORAL 4 TIMES DAILY
Status: DISCONTINUED | OUTPATIENT
Start: 2020-09-11 | End: 2020-09-18 | Stop reason: HOSPADM

## 2020-09-11 RX ADMIN — HEPARIN SODIUM 5000 UNITS: 5000 INJECTION INTRAVENOUS; SUBCUTANEOUS at 05:09

## 2020-09-11 RX ADMIN — GABAPENTIN 250 MG: 250 SOLUTION ORAL at 10:09

## 2020-09-11 RX ADMIN — PIPERACILLIN SODIUM AND TAZOBACTAM SODIUM 4.5 G: 4; .5 INJECTION, POWDER, LYOPHILIZED, FOR SOLUTION INTRAVENOUS at 09:09

## 2020-09-11 RX ADMIN — PROPRANOLOL HYDROCHLORIDE 20 MG: 20 TABLET ORAL at 04:09

## 2020-09-11 RX ADMIN — PROPRANOLOL HYDROCHLORIDE 20 MG: 20 TABLET ORAL at 10:09

## 2020-09-11 RX ADMIN — PIPERACILLIN SODIUM AND TAZOBACTAM SODIUM 4.5 G: 4; .5 INJECTION, POWDER, LYOPHILIZED, FOR SOLUTION INTRAVENOUS at 04:09

## 2020-09-11 RX ADMIN — HEPARIN SODIUM 5000 UNITS: 5000 INJECTION INTRAVENOUS; SUBCUTANEOUS at 04:09

## 2020-09-11 RX ADMIN — PIPERACILLIN SODIUM AND TAZOBACTAM SODIUM 4.5 G: 4; .5 INJECTION, POWDER, LYOPHILIZED, FOR SOLUTION INTRAVENOUS at 12:09

## 2020-09-11 RX ADMIN — BACLOFEN 10 MG: 10 TABLET ORAL at 09:09

## 2020-09-11 RX ADMIN — LOPERAMIDE HYDROCHLORIDE 2 MG: 1 SOLUTION ORAL at 04:09

## 2020-09-11 RX ADMIN — DIPHENHYDRAMINE HYDROCHLORIDE 12.5 MG: 25 SOLUTION ORAL at 10:09

## 2020-09-11 RX ADMIN — LOPERAMIDE HYDROCHLORIDE 2 MG: 1 SOLUTION ORAL at 10:09

## 2020-09-11 RX ADMIN — Medication 1 CAPSULE: at 09:09

## 2020-09-11 RX ADMIN — HEPARIN SODIUM 5000 UNITS: 5000 INJECTION INTRAVENOUS; SUBCUTANEOUS at 10:09

## 2020-09-11 RX ADMIN — BACLOFEN 10 MG: 10 TABLET ORAL at 10:09

## 2020-09-11 RX ADMIN — PROPRANOLOL HYDROCHLORIDE 20 MG: 20 TABLET ORAL at 09:09

## 2020-09-11 RX ADMIN — BACLOFEN 10 MG: 10 TABLET ORAL at 04:09

## 2020-09-11 RX ADMIN — PANTOPRAZOLE SODIUM 40 MG: 40 GRANULE, DELAYED RELEASE ORAL at 09:09

## 2020-09-11 RX ADMIN — PHENOBARBITAL 100 MG: 20 ELIXIR ORAL at 10:09

## 2020-09-11 NOTE — TREATMENT PLAN
GI Treatment Plan    Glen Moscoso is a 24 y.o. male admitted to hospital 8/26/2020 (Hospital Day: 17) due to Pneumonia due to Pseudomonas species.     Interval History  Rectal tube placed with per nursing improvement in stool output.    Pathology pending    Objective  Temp:  [97.9 °F (36.6 °C)-99.4 °F (37.4 °C)] 99.4 °F (37.4 °C) (09/11 1159)  Pulse:  [] 103 (09/11 1159)  BP: (109-138)/(64-71) 109/64 (09/11 1159)  Resp:  [16-20] 18 (09/11 1159)  SpO2:  [95 %-98 %] 97 % (09/11 1159)    General: Alert, Oriented x3, no distress  Abdomen: Normoactive bowel sounds. Non-distended. Normal tympany. Soft. Non-tender. No peritoneal signs.    Laboratory    Recent Labs   Lab 09/09/20  0650 09/10/20  0341 09/11/20  0509   HGB 10.8* 10.1* 8.9*       Lab Results   Component Value Date    WBC 7.71 09/11/2020    HGB 8.9 (L) 09/11/2020    HCT 29.9 (L) 09/11/2020    MCV 99 (H) 09/11/2020     (H) 09/11/2020       Lab Results   Component Value Date     09/11/2020    K 3.8 09/11/2020     09/11/2020    CO2 23 09/11/2020    BUN 8 09/11/2020    CREATININE 0.5 09/11/2020    CALCIUM 8.3 (L) 09/11/2020    ANIONGAP 9 09/11/2020    ESTGFRAFRICA >60.0 09/11/2020    EGFRNONAA >60.0 09/11/2020       Lab Results   Component Value Date    ALT 13 09/11/2020    AST 14 09/11/2020    ALKPHOS 109 09/11/2020    BILITOT 0.2 09/11/2020       Lab Results   Component Value Date    INR 1.1 09/01/2020       Plan  - continue aggressive treatment of diarrhea.  Can start scheduled imodium and uptitrate to QID, if unrelenting can start scheduled cholestyramine and uptitrate to QID.  Please call back with questions if diarrhea continues after titration to maximum doses of above medications  - f/u pathology  - We are signing-off. Please call with any questions.  We will arrange for GI clinic follow up    Thank you for involving us in the care of Glen Moscoso. Please call with any additional questions, concerns or changes in the patient's  clinical status.    Junior Sloan MD  Gastroenterology Fellow  Spectralink: [id number]

## 2020-09-11 NOTE — ASSESSMENT & PLAN NOTE
· Patient with obvious muscle wasting and BMI of 15.8. Nutrition consulted on admit.   · Patient started on TF with Peptamen 1.5 with prebio at 40 cc/hr and continued in hospital and nutrition following. .

## 2020-09-11 NOTE — ASSESSMENT & PLAN NOTE
Pressure injury of right ischium, stage 4  · Noted on admit to have bilateral ischial ulcers with eschar. Wound  consulted and recommended frequent turning and low air loss mattress. Wound care re-evaluated on  and recommended general surgery to evaluate as concerned wounds appeared to be worsening.  · General surgery evaluated and at first did not feel intervention needed but ulcers continued ot worse so sharp debridement of ulcers done on  at bedside and abscess cavity and tunneling noted to right ischial ulcers that was drained by General surgery. Patient was debrided to healthy tissue by surgery.  · CT scan of pelvis done and no bone involvement noted with ulcers to suggest underlying osteomyelitis.   · Continue wound care as per surgery recs with packing with wet to dry gauze dressings BID and prn.   · No cultures were taken of abscess site by surgery at time of debridement so no causative organism identiifed.   · ID recommmended to continue IV Zosyn to treat until 2020.   · Turn patient every 2 hours and low air loss mattress. Rectal tube to help prevent stool from getting into wounds.

## 2020-09-11 NOTE — PLAN OF CARE
Problem: Nutrition Impairment (Mechanical Ventilation, Invasive)  Goal: Optimal Nutrition Delivery  Outcome: Ongoing, Progressing     Problem: Skin and Tissue Injury (Artificial Airway)  Goal: Absence of Device-Related Skin or Tissue Injury  Outcome: Ongoing, Progressing    Problem: Infection  Goal: Infection Symptom Resolution  Outcome: Ongoing, Progressing

## 2020-09-11 NOTE — CARE UPDATE
Rapid Response Nurse Chart Check     Chart check completed, abnormal VS noted, . bedside RN, Alan contacted. No concerns verbalized at this time, RN states that pt was getting Chest Physiotherapy. Instructed to call [phone] for further concerns or assistance.

## 2020-09-11 NOTE — PLAN OF CARE
Problem: Skin Injury Risk Increased  Goal: Skin Health and Integrity  Outcome: Ongoing, Progressing     Problem: Infection  Goal: Infection Symptom Resolution  Outcome: Ongoing, Progressing     Problem: Malnutrition  Goal: Improved Nutritional Intake  Outcome: Ongoing, Progressing     Problem: Fluid Imbalance (Pneumonia)  Goal: Fluid Balance  Outcome: Ongoing, Progressing     No acute changes for day shift . The patient had one bowel movement.  Wound care completed. New Tunneling was possible so wound care team was called to evaluate the patient's wounds. Wet to dry packing as directed continued. The patient was afebrile for shift. A wound consult was done today. New orders were received for flexy seal to prevent the patient's wounds from being contaminated. The patient's vitals were stable for shift. Will continue to monitor for changes in condition. Report was given to Omar SIEGEL.

## 2020-09-11 NOTE — PROGRESS NOTES
[facility] Medicine  Progress Note    Patient Name: Glen Moscoso  MRN: [id number]  Patient Class: IP- Inpatient   Admission Date: 8/26/2020  Length of Stay: 16 days  Attending Physician: Nohemi Farris MD  Primary Care Provider: Yadi Lawson MD        Subjective:     Principal Problem:Pneumonia due to Pseudomonas species        HPI:  25 yo M with cerebral palsy, Seizures, and recent admission for hyponatremia who presents to [facility] for encephalopathy, sepsis, diarrhea, and possible seizure. He presented to OSH after being found unresponsive and in resp distress. He was found down by his mother upon EMS arrival he had agonal resp 3-4 per min, he was intubated by EMS and take to OSH ED. He was hypotensive on arrival started on Levo gtt, received antibiotics, ABG showed metabolic acidosis. He is being admitted to [facility] for a higher level of care, EGG and close monitoring.   Recently admitted [facility] from August 4-August 18 with hypokalemia, colitis, left buttock decubitus ulcer, fever of uncertain etiology, aspiration pneumonitis, leukocytosis, diarrhea, tachypnea, sinus tachycardia.  Serum bicarbonate August 11-18 ranged 13-17.  Multiple cultures to include respiratory cultures, blood cultures, and stool cultures (including C diff) did not have acute abnormalities.  COVID on August 9 was negative.  He was treated with p.o. vancomycin along with Levaquin and Flagyl.    Overview/Hospital Course:  Patient admitted to Neuro ICU and started on broad spectrum antibiotics (Vancomcyin and Zosyn) for his leukocytosis and fevers and elevated procalcitonin of 6. Patient was pancultured with blood cultures showing NGTD, urine cx NGTD and C diff negative. Respiratory cultures grew Serratia marcescens and Pseudomonas aeruginosa. After respiratory cultures returned Vancomycin discontinued and patient continued on IV Zosyn. Patient was extubated on 8/27 and able to be weaned to room air while in  Neuro ICU. At baseline he is nonverbal and minimally interactive. Will not follow commands. EEG was done on 8/27 which showed diffuse disorganized slowing of the background which was very nonspecific. No seizures recorded in this study. Patient restarted on home TF via PEG after extubation and free water flushes via PEG for hypernatremia noted on 8/29 of 150 with improvement. Patient was stepped down from Neuro ICU on evening of 8/29. After stepdown on evening of 8/29 patient with temperature of 102 F. Rapid response called but no new orders given. Prior to stepdown from Neuo ICU patient switched from IV Zosyn to Cipro after sensitivities returned from respiratory cultures. Patient on 8/30 had another rapid response called as patient developed stridor and labored breathing. Patient given Albuterol nebulizer treatment and nebulized racemic Epinephrine with improvement. Dr. Hitchcock and Neuro ICU team came to bedside and patient did improve after racemic Epi. Patient had ABG done that showed 7.45/31/101/98%. Stridor felt to be mechanical in nature as improved when patient sat up and when his chin was pulled up. Patien sent back to Neuro ICU for close monitoring. Patient did not require reintubation and was aggressively suctioned, placed on cough assist and CPT. Stridor felt mechanical as improved with jaw thrust and holding patient's jaw closed. Patient stepped back down to floor on 9/3. Wound care following patient and patient does have several areas of skin breakdown and most notably unstageable eschar to bilateral ischium and wound care recommending general surgery consult for evaluation and general surgery consulted on step down to floor. Patient's repeat sputum again grew Serratia and Pseudomonas so ID consulted and recommended resuming IV Zosyn to treat pneumonia and to cover wound and recommended to continue until 9/17. Bilateral ischial ulcers with eschar were sharply debrided bedside using #10 scalpel to  healthy bleeding tissue on 9/7. The right ulcer had underlying abscess cavity that tracked inferior and laterally about 2cm. The bases of both ulcers had fibrinous exudate debrided. Both sides packed with wet-to-dry gauze dressings. Patient after stepdown had issues with diarrhea and abdominal pain which was not new as patient had a recent hospitalization at [facility] in 8/2020 for treatment for colitis. GI performed flex sig on  On 9/9 and not acute abnormalities noted and random biopsies taken. Repeat stool studies done and negative for infectious etiology for diarrhea. ID reassessed patient on 9/10 and recommending to extend IV Zosyn until 9/24 to cover infected ischial orders. Diarrhea improving on 9/11. Rectal tube place to help prevent contamination of open wounds on bilateral ischium.     Interval History: Patient with no acute events overnight. Rectal tube place yesterday as stool getting tatyana ischial wounds. Patient remains off oxygen and not having any respiratory issues. GI recommending scheduling Imodium or even cholestyramine to help with loose stools. Pathology for colon biopsies are pending.     Review of Systems   Unable to perform ROS: Patient nonverbal     Objective:     Vital Signs (Most Recent):  Temp: 99.4 °F (37.4 °C) (09/11/20 1159)  Pulse: 103 (09/11/20 1159)  Resp: 18 (09/11/20 1159)  BP: 109/64 (09/11/20 1159)  SpO2: 97 % (09/11/20 1159) on room air Vital Signs (24h Range):  Temp:  [97.9 °F (36.6 °C)-99.4 °F (37.4 °C)] 99.4 °F (37.4 °C)  Pulse:  [] 103  Resp:  [16-20] 18  SpO2:  [95 %-98 %] 97 %  BP: (109-138)/(64-71) 109/64     Weight: 33.1 kg (73 lb)  Body mass index is 15.8 kg/m².    Intake/Output Summary (Last 24 hours) at 9/11/2020 1420  Last data filed at 9/10/2020 2100  Gross per 24 hour   Intake 830 ml   Output 850 ml   Net -20 ml      Physical Exam  Vitals signs and nursing note reviewed.   Constitutional:       General: He is not in acute distress.     Appearance: He is  cachectic. He is ill-appearing (Chronic).   Eyes:      Conjunctiva/sclera: Conjunctivae normal.   Neck:      Trachea: No abnormal tracheal secretions.   Cardiovascular:      Rate and Rhythm: Normal rate and regular rhythm.      Heart sounds: Normal heart sounds. No murmur. No gallop.    Pulmonary:      Effort: Pulmonary effort is normal. No respiratory distress.      Breath sounds: Normal breath sounds. No wheezing or rales.   Abdominal:      General: Abdomen is flat. Bowel sounds are normal. There is no distension.      Palpations: Abdomen is soft.      Comments: PEG tube in place   Neurological:      Mental Status: He is alert.      Comments: Contractures to all extremities noted         Significant Labs:   CBC:   Recent Labs   Lab 09/10/20  0341 09/11/20  0509   WBC 8.05 7.71   HGB 10.1* 8.9*   HCT 32.2* 29.9*   * 856*     CMP:   Recent Labs   Lab 09/10/20  0341 09/11/20  0509    142   K 3.8 3.8   * 110   CO2 24 23   * 112*   BUN 10 8   CREATININE 0.6 0.5   CALCIUM 8.6* 8.3*   PROT 6.6 6.3   ALBUMIN 1.9* 1.9*   BILITOT 0.2 0.2   ALKPHOS 135 109   AST 15 14   ALT 18 13   ANIONGAP 9 9   EGFRNONAA >60.0 >60.0     Magnesium:   Recent Labs   Lab 09/10/20  0341 09/11/20  0509   MG 1.9 1.7       Significant Imaging: I have reviewed all pertinent imaging results/findings within the past 24 hours.      Assessment/Plan:      * Pneumonia due to Pseudomonas aerginuosa and Serratia marcesens  · Improving. Patient not requiring oxygen and respiratory status is stable.   · Patient admitted with acute hypoxic respiratory failure and sepsis. Blood and urine cultures negative and C. Diff negative but respiratory culture grew Serratia and Pseudomonas. Patient treated with IV Zosyn and switched to IV Cipro and treatment stopped on 9/3 as presumably had completed treatment course for pneumonia but continued to spike fever and repeat sputum cultures on 9/6 again grew Serratia so ID consulted on 9/3 and  recommending resuming IV Zosyn to treat. ID recommending to extend IV Zosyn until  to cover both wounds and pneumonia.   · Patient intubated on admit to Neuro ICU and required mechanical ventilation intimally but able to be extubated and weaned to room air and remains on room air. Patient now on room air and breathing on his own.       Decubitus ulcer of ischial area, left, stage IV  Pressure injury of right ischium, stage 4  · Noted on admit to have bilateral ischial ulcers with eschar. Wound  consulted and recommended frequent turning and low air loss mattress. Wound care re-evaluated on  and recommended general surgery to evaluate as concerned wounds appeared to be worsening.  · General surgery evaluated and at first did not feel intervention needed but ulcers continued ot worse so sharp debridement of ulcers done on  at bedside and abscess cavity and tunneling noted to right ischial ulcers that was drained by General surgery. Patient was debrided to healthy tissue by surgery.  · CT scan of pelvis done and no bone involvement noted with ulcers to suggest underlying osteomyelitis.   · Continue wound care as per surgery recs with packing with wet to dry gauze dressings BID and prn.   · No cultures were taken of abscess site by surgery at time of debridement so no causative organism identiifed.   · ID recommmended to continue IV Zosyn to treat until 2020.   · Turn patient every 2 hours and low air loss mattress. Rectal tube to help prevent stool from getting into wounds.        Seizure disorder  Chronic and controlled. EEG done on this admit showed no status epilepticus. Continue Phenobarbital 100 mg nightly via G tube to treat as patient takes at home       Diarrhea  Proctocolitis  · C dff negative on admit.  · Diarrhea worsened in hospital. Patient with similar episode in 2020 so GI consulted for evaluation of diarrhea. Repeat CT scan on this admit on  showed persistent proctocolitis and  inflammatory changes in colon area as seen on previous imaging.   · GI consulted and performed flex sig on 9/9 to evaluate and showed no gross abnormalities, biopsies taken. Repeat stool studies on this admit negative for infectious etiology. Pathology from colon biopsies pending and need to follow-up results.   · As per GI, give trial of scheduled Imodium to see if helps so will try Imodium 2 mg po 4 times daily. If that fails then could consider trial of cholestyramine.     Anemia of chronic disease  Acute blood loss anemia  · Chronic and controlled. Monitor with daily CBC and transfuse if Hgb < 7.   · Hgb dropped to 6.7 on 9/8 and patient transfused 1 unit of PRBCs. FOBT done and heme positive. GI consulted and flex sig done on 9/9 and unremarkable.  · Hgb stable after blood transfusion on 8/8 and stable at 9-10.  · Monitor with daily CBC and transfuse if Hgb < 7.       Severe protein-calorie malnutrition  · Patient with obvious muscle wasting and BMI of 15.8. Nutrition consulted on admit.   · Patient started on TF with Peptamen 1.5 with Prebio at 40 cc/hr and continued in hospital and nutrition following.      Cerebral palsy  Patient with poor functional baseline and bed bound with extremity contractures and total care at home. Patient at baseline function in hospital.         VTE Risk Mitigation (From admission, onward)         Ordered     heparin (porcine) injection 5,000 Units  Every 8 hours      09/09/20 1644     Place sequential compression device  Until discontinued      08/26/20 0210     IP VTE LOW RISK PATIENT  Once      08/26/20 0210                Discharge Planning   ROCÍO: 9/14/2020     Code Status: Full Code   Is the patient medically ready for discharge?: No    Reason for patient still in hospital (select all that apply): Patient trending condition  Discharge Plan A: Home with family   Discharge Delays: None known at this time              Nohemi Farris MD  Department of Hospital Medicine    [facility]

## 2020-09-11 NOTE — ASSESSMENT & PLAN NOTE
· Improving. Patient not requiring oxygen and respiratory status is stable.   · Patient admitted with acute hypoxic respiratory failure and sepsis. Blood and urine cultures negative and C. Diff negative but respiratory culture grew Serratia and Pseudomonas. Patient treated with IV Zosyn and switched to IV Cipro and treatment stopped on 9/3 as presumably had completed treatment course for pneumonia but continued to spike fever and repeat sputum cultures on 9/6 again grew Serratia so ID consulted on 9/3 and recommending resuming IV Zosyn to treat. ID recommending to extend IV Zosyn until 9/24 to cover both wounds and pneumonia.   · Patient intubated on admit to Neuro ICU and required mechanical ventilation intimally but able to be extubated and weaned to room air and remains on room air. Patient now on room air and breathing on his own.

## 2020-09-11 NOTE — SUBJECTIVE & OBJECTIVE
Interval History: Patient with no acute events overnight. Rectal tube place yesterday as stool getting tatyana ischial wounds. Patient remains off oxygen and not having any respiratory issues. GI recommending scheduling Imodium or even cholestyramine to help with loose stools. Pathology for colon biopsies are pending.     Review of Systems   Unable to perform ROS: Patient nonverbal     Objective:     Vital Signs (Most Recent):  Temp: 99.4 °F (37.4 °C) (09/11/20 1159)  Pulse: 103 (09/11/20 1159)  Resp: 18 (09/11/20 1159)  BP: 109/64 (09/11/20 1159)  SpO2: 97 % (09/11/20 1159) on room air Vital Signs (24h Range):  Temp:  [97.9 °F (36.6 °C)-99.4 °F (37.4 °C)] 99.4 °F (37.4 °C)  Pulse:  [] 103  Resp:  [16-20] 18  SpO2:  [95 %-98 %] 97 %  BP: (109-138)/(64-71) 109/64     Weight: 33.1 kg (73 lb)  Body mass index is 15.8 kg/m².    Intake/Output Summary (Last 24 hours) at 9/11/2020 1420  Last data filed at 9/10/2020 2100  Gross per 24 hour   Intake 830 ml   Output 850 ml   Net -20 ml      Physical Exam  Vitals signs and nursing note reviewed.   Constitutional:       General: He is not in acute distress.     Appearance: He is cachectic. He is ill-appearing (Chronic).   Eyes:      Conjunctiva/sclera: Conjunctivae normal.   Neck:      Trachea: No abnormal tracheal secretions.   Cardiovascular:      Rate and Rhythm: Normal rate and regular rhythm.      Heart sounds: Normal heart sounds. No murmur. No gallop.    Pulmonary:      Effort: Pulmonary effort is normal. No respiratory distress.      Breath sounds: Normal breath sounds. No wheezing or rales.   Abdominal:      General: Abdomen is flat. Bowel sounds are normal. There is no distension.      Palpations: Abdomen is soft.      Comments: PEG tube in place   Neurological:      Mental Status: He is alert.      Comments: Contractures to all extremities noted         Significant Labs:   CBC:   Recent Labs   Lab 09/10/20  0341 09/11/20  0509   WBC 8.05 7.71   HGB 10.1* 8.9*   HCT  32.2* 29.9*   * 856*     CMP:   Recent Labs   Lab 09/10/20  0341 09/11/20  0509    142   K 3.8 3.8   * 110   CO2 24 23   * 112*   BUN 10 8   CREATININE 0.6 0.5   CALCIUM 8.6* 8.3*   PROT 6.6 6.3   ALBUMIN 1.9* 1.9*   BILITOT 0.2 0.2   ALKPHOS 135 109   AST 15 14   ALT 18 13   ANIONGAP 9 9   EGFRNONAA >60.0 >60.0     Magnesium:   Recent Labs   Lab 09/10/20  0341 09/11/20  0509   MG 1.9 1.7       Significant Imaging: I have reviewed all pertinent imaging results/findings within the past 24 hours.

## 2020-09-11 NOTE — ASSESSMENT & PLAN NOTE
Acute blood loss anemia  · Chronic and controlled. Monitor with daily CBC and transfuse if Hgb < 7.   · Hgb dropped to 6.7 on 9/8 and patient transfused 1 unit of PRBCs. FOBT done and heme positive. GI consulted and flex sig done on 9/9 and unremarkable.  · Hgb stable after blood transfusion on 8/8 and stable at 9-10.  · Monitor with daily CBC and transfuse if Hgb < 7.

## 2020-09-11 NOTE — SUBJECTIVE & OBJECTIVE
Interval History: Patient with no acute events overnight. Patient's mother got admitted overnight to medicine team due to sepsis and acute pyelonephritis related to left sided ureteral stones. Patient continues to have loose stools and rectal tube placed as going into wounds on patient's ischial areas. ID recommending extending IV Zosyn until 9/24 to cover wounds and pneumonia.      Review of Systems   Unable to perform ROS: Patient nonverbal     Objective:     Vital Signs (Most Recent):  Temp: 97.9 °F (36.6 °C) (09/10/20 1921)  Pulse: 99 (09/10/20 1921)  Resp: 18 (09/10/20 1921)  BP: 138/71 (09/10/20 1921)  SpO2: 98 % (09/10/20 1921) on room air Vital Signs (24h Range):  Temp:  [97.4 °F (36.3 °C)-98.3 °F (36.8 °C)] 97.9 °F (36.6 °C)  Pulse:  [] 99  Resp:  [18] 18  SpO2:  [98 %-100 %] 98 %  BP: (112-140)/(62-72) 138/71     Weight: 33.1 kg (73 lb)  Body mass index is 15.8 kg/m².    Intake/Output Summary (Last 24 hours) at 9/10/2020 2130  Last data filed at 9/10/2020 1630  Gross per 24 hour   Intake 600 ml   Output 300 ml   Net 300 ml      Physical Exam  Vitals signs and nursing note reviewed.   Constitutional:       General: He is not in acute distress.     Appearance: He is cachectic. He is ill-appearing (Chronic).   Eyes:      Conjunctiva/sclera: Conjunctivae normal.   Neck:      Trachea: No abnormal tracheal secretions.   Cardiovascular:      Rate and Rhythm: Normal rate and regular rhythm.      Heart sounds: Normal heart sounds. No murmur. No gallop.    Pulmonary:      Effort: Pulmonary effort is normal. No respiratory distress.      Breath sounds: Normal breath sounds. No wheezing or rales.   Abdominal:      General: Abdomen is flat. Bowel sounds are normal. There is no distension.      Palpations: Abdomen is soft.      Comments: PEG tube in place   Neurological:      Mental Status: He is alert.      Comments: Contractures to all extremities noted         Significant Labs:   CBC:   Recent Labs   Lab  09/09/20  0650 09/10/20  0341   WBC 10.47 8.05   HGB 10.8* 10.1*   HCT 35.8* 32.2*   * 888*     CMP:   Recent Labs   Lab 09/09/20 0650 09/10/20  0341    144   K 4.1 3.8    111*   CO2 24 24   GLU 79 113*   BUN 8 10   CREATININE 0.6 0.6   CALCIUM 9.0 8.6*   PROT 7.8 6.6   ALBUMIN 2.3* 1.9*   BILITOT 0.4 0.2   ALKPHOS 175* 135   AST 24 15   ALT 24 18   ANIONGAP 11 9   EGFRNONAA >60.0 >60.0     Magnesium:   Recent Labs   Lab 09/09/20 0650 09/10/20  0341   MG 1.7 1.9       Significant Imaging: I have reviewed all pertinent imaging results/findings within the past 24 hours.

## 2020-09-11 NOTE — ASSESSMENT & PLAN NOTE
· Patient with obvious muscle wasting and BMI of 15.8. Nutrition consulted on admit.   · Patient started on TF with Peptamen 1.5 with Prebio at 40 cc/hr and continued in hospital and nutrition following.

## 2020-09-11 NOTE — ASSESSMENT & PLAN NOTE
Proctocolitis  · C dff negative on admit.  · Diarrhea worsened in hospital. Patient with similar episode in 8/2020 so GI consulted for evaluation of diarrhea. Repeat CT scan on this admit on 9/4 showed persistent proctocolitis and inflammatory changes in colon area as seen on previous imaging.   · GI consulted and performed flex sig on 9/9 to evaluate and showed no gross abnormalities, biopsies taken. Repeat stool studies on this admit negative for infectious etiology. Pathology from colon biopsies pending and need to follow-up results.   · As per GI, give trial of scheduled Imodium to see if helps so will try Imodium 2 mg po 4 times daily. If that fails then could consider trial of cholestyramine.

## 2020-09-11 NOTE — ASSESSMENT & PLAN NOTE
Proctocolitis  · C dff negative on admit.  · Diarrhea worsened in hospital. Patient with similar episode in 8/2020 so GI consulted for evaluation of diarrhea. Repeat CT scan on this admit on 9/4 showed persistent proctocolitis as seen on previous imaging.   · GI performed flex sig on 9/9 to evaluate and showed no gross abnormalities, biopsies taken. Repeat stool studies on this admit negative for infectious etiology.   · Treat symptomatically with Imodium prn and Lactobacillus daily.

## 2020-09-11 NOTE — ASSESSMENT & PLAN NOTE
Acute blood loss anemia  · Chronic and controlled. Monitor with daily CBC and transfuse if Hgb < 7.   · Hgb dropped to 6.7 on 9/8 and patient transfused 1 unit of PRBCs. FOBT done and heme positive. GI consulted and flex sig done on 9/9 and unremarkable.  · Hgb stable after blood transfusion on 8/8 and stable at 10 on 9/10.   · Monitor with daily CBC and transfuse if Hgb < 7.

## 2020-09-11 NOTE — ASSESSMENT & PLAN NOTE
Patient with poor functional baseline and bed bound with extremity contractures and total care at home. Patient at baseline function in hospital.

## 2020-09-11 NOTE — NURSING
Wound care was paged to evaluate the patient's bylateral ischial wounds.  Medical team made aware and new orders were received.

## 2020-09-11 NOTE — ASSESSMENT & PLAN NOTE
Pressure injury of right ischium, stage 4  · Noted on admit to have bilateral ischial ulcers with eschar. Wound  consulted and recommended frequent turning and low air loss mattress. Wound care re-evaluated on  and recommended general surgery to evaluate as concerned wounds appeared to be worsening.  · General surgery evaluated and at first did not feel intervention needed but ulcers continued ot worse so sharp debridement of ulcers done on  at bedside and abscess cavity and tunneling noted to right ischial ulcers. Patient was debrided to healthy tissue by general surgery.   · CT scan of pelvis done and no bone involvement noted with ulcers to suggest underlying osteomyelitis.   · Continue wound care as per surgery recs with packing with wet to dry gauze dressings BID and prn.   · No cultures were taken of abscess site.   · ID recommmended to continue IV Zosyn to treat until .   · Turn patient every 2 hours.

## 2020-09-11 NOTE — ASSESSMENT & PLAN NOTE
· Improving.   · Patient admitted with acute hypoxic respiratory failure and sepsis. Blood and urine cultures negative and C. Diff negative but respiratory culture grew Serratia and Pseudomonas. Patient treated with IV Zosyn and switched to IV Cipro and treatment stopped on 9/3 as presumably had completed treatment course for pneumonia but continued to spike fever and repeat sputum cultures on 9/6 again grew Serratia and Pseudomonas so ID consulted on 9/3 and recommending resuming IV Zosyn to treat.ID recommending to extend IV Zosyn until 9/24.   · Patient intubated on admit to Neuro ICU and required mechanical ventilation intimally but able to be extubated and weaned to room air and remains on room air. Patient now on room air and breathing on his own.

## 2020-09-11 NOTE — PROGRESS NOTES
[facility] Medicine  Progress Note    Patient Name: Glen Moscoso  MRN: [id number]  Patient Class: IP- Inpatient   Admission Date: 8/26/2020  Length of Stay: 15 days  Attending Physician: Nohemi Farris MD  Primary Care Provider: Yadi Lawson MD        Subjective:     Principal Problem:Pneumonia due to Pseudomonas species        HPI:  25 yo M with cerebral palsy, Seizures, and recent admission for hyponatremia who presents to [facility] for encephalopathy, sepsis, diarrhea, and possible seizure. He presented to OSH after being found unresponsive and in resp distress. He was found down by his mother upon EMS arrival he had agonal resp 3-4 per min, he was intubated by EMS and take to OSH ED. He was hypotensive on arrival started on Levo gtt, received antibiotics, ABG showed metabolic acidosis. He is being admitted to [facility] for a higher level of care, EGG and close monitoring.   Recently admitted [facility] from August 4-August 18 with hypokalemia, colitis, left buttock decubitus ulcer, fever of uncertain etiology, aspiration pneumonitis, leukocytosis, diarrhea, tachypnea, sinus tachycardia.  Serum bicarbonate August 11-18 ranged 13-17.  Multiple cultures to include respiratory cultures, blood cultures, and stool cultures (including C diff) did not have acute abnormalities.  COVID on August 9 was negative.  He was treated with p.o. vancomycin along with Levaquin and Flagyl.    Overview/Hospital Course:  Patient admitted to Neuro ICU and started on broad spectrum antibiotics (Vancomcyin and Zosyn) for his leukocytosis and fevers and elevated procalcitonin of 6. Patient was pancultured with blood cultures showing NGTD, urine cx NGTD and C diff negative. Respiratory cultures grew Serratia marcescens and Pseudomonas aeruginosa. After respiratory cultures returned Vancomycin discontinued and patient continued on IV Zosyn. Patient was extubated on 8/27 and able to be weaned to room air while in  Neuro ICU. At baseline he is nonverbal and minimally interactive. Will not follow commands. EEG was done on 8/27 which showed diffuse disorganized slowing of the background which was very nonspecific. No seizures recorded in this study. Patient restarted on home TF via PEG after extubation and free water flushes via PEG for hypernatremia noted on 8/29 of 150 with improvement. Patient was stepped down from Neuro ICU on evening of 8/29. After stepdown on evening of 8/29 patient with temperature of 102 F. Rapid response called but no new orders given. Prior to stepdown from Neuo ICU patient switched from IV Zosyn to Cipro after sensitivities returned from respiratory cultures. Patient on 8/30 had another rapid response called as patient developed stridor and labored breathing. Patient given Albuterol nebulizer treatment and nebulized racemic Epinephrine with improvement. Dr. Hitchcock and Neuro ICU team came to bedside and patient did improve after racemic Epi. Patient had ABG done that showed 7.45/31/101/98%. Stridor felt to be mechanical in nature as improved when patient sat up and when his chin was pulled up. Patien sent back to Neuro ICU for close monitoring. Patient did not require reintubation and was aggressively suctioned, placed on cough assist and CPT. Stridor felt mechanical as improved with jaw thrust and holding patient's jaw closed. Patient stepped back down to floor on 9/3. Wound care following patient and patient does have several areas of skin breakdown and most notably unstageable eschar to bilateral ischium and wound care recommending general surgery consult for evaluation and general surgery consulted on step down to floor. Patient's repeat sputum again grew Serratia and Pseudomonas so ID consulted and recommended resuming IV Zosyn to treat pneumonia and to cover wound and recommended to continue until 9/17. Bilateral ischial ulcers with eschar were sharply debrided bedside using #10 scalpel to  healthy bleeding tissue on 9/7. The right ulcer had underlying abscess cavity that tracked inferior and laterally about 2cm. The bases of both ulcers had fibrinous exudate debrided. Both sides packed with wet-to-dry gauze dressings. Patient after stepdown had issues with diarrhea and abdominal pain which was nor\t new as patient had a recent hospitalization at [facility] in 8/2020 for treatment for colitis. GI performed flex sig on  On 9/9 and not acute abnormalities noted and random biopsies taken. Repeat stool studies done and negative for infectious etiology for diarrhea.     Interval History: Patient with no acute events overnight. Patient's mother got admitted overnight to medicine team due to sepsis and acute pyelonephritis related to left sided ureteral stones. Patient continues to have loose stools and rectal tube placed as going into wounds on patient's ischial areas. ID recommending extending IV Zosyn until 9/24 to cover wounds and pneumonia.      Review of Systems   Unable to perform ROS: Patient nonverbal     Objective:     Vital Signs (Most Recent):  Temp: 97.9 °F (36.6 °C) (09/10/20 1921)  Pulse: 99 (09/10/20 1921)  Resp: 18 (09/10/20 1921)  BP: 138/71 (09/10/20 1921)  SpO2: 98 % (09/10/20 1921) on room air Vital Signs (24h Range):  Temp:  [97.4 °F (36.3 °C)-98.3 °F (36.8 °C)] 97.9 °F (36.6 °C)  Pulse:  [] 99  Resp:  [18] 18  SpO2:  [98 %-100 %] 98 %  BP: (112-140)/(62-72) 138/71     Weight: 33.1 kg (73 lb)  Body mass index is 15.8 kg/m².    Intake/Output Summary (Last 24 hours) at 9/10/2020 2130  Last data filed at 9/10/2020 1630  Gross per 24 hour   Intake 600 ml   Output 300 ml   Net 300 ml      Physical Exam  Vitals signs and nursing note reviewed.   Constitutional:       General: He is not in acute distress.     Appearance: He is cachectic. He is ill-appearing (Chronic).   Eyes:      Conjunctiva/sclera: Conjunctivae normal.   Neck:      Trachea: No abnormal tracheal secretions.    Cardiovascular:      Rate and Rhythm: Normal rate and regular rhythm.      Heart sounds: Normal heart sounds. No murmur. No gallop.    Pulmonary:      Effort: Pulmonary effort is normal. No respiratory distress.      Breath sounds: Normal breath sounds. No wheezing or rales.   Abdominal:      General: Abdomen is flat. Bowel sounds are normal. There is no distension.      Palpations: Abdomen is soft.      Comments: PEG tube in place   Neurological:      Mental Status: He is alert.      Comments: Contractures to all extremities noted         Significant Labs:   CBC:   Recent Labs   Lab 09/09/20  0650 09/10/20  0341   WBC 10.47 8.05   HGB 10.8* 10.1*   HCT 35.8* 32.2*   * 888*     CMP:   Recent Labs   Lab 09/09/20  0650 09/10/20  0341    144   K 4.1 3.8    111*   CO2 24 24   GLU 79 113*   BUN 8 10   CREATININE 0.6 0.6   CALCIUM 9.0 8.6*   PROT 7.8 6.6   ALBUMIN 2.3* 1.9*   BILITOT 0.4 0.2   ALKPHOS 175* 135   AST 24 15   ALT 24 18   ANIONGAP 11 9   EGFRNONAA >60.0 >60.0     Magnesium:   Recent Labs   Lab 09/09/20  0650 09/10/20  0341   MG 1.7 1.9       Significant Imaging: I have reviewed all pertinent imaging results/findings within the past 24 hours.      Assessment/Plan:      * Pneumonia due to Pseudomonas aerginuosa and Serratia marcesens  · Improving.   · Patient admitted with acute hypoxic respiratory failure and sepsis. Blood and urine cultures negative and C. Diff negative but respiratory culture grew Serratia and Pseudomonas. Patient treated with IV Zosyn and switched to IV Cipro and treatment stopped on 9/3 as presumably had completed treatment course for pneumonia but continued to spike fever and repeat sputum cultures on 9/6 again grew Serratia and Pseudomonas so ID consulted on 9/3 and recommending resuming IV Zosyn to treat.ID recommending to extend IV Zosyn until 9/24.   · Patient intubated on admit to Neuro ICU and required mechanical ventilation intimally but able to be extubated  and weaned to room air and remains on room air. Patient now on room air and breathing on his own.       Decubitus ulcer of ischial area, left, stage IV  Pressure injury of right ischium, stage 4  · Noted on admit to have bilateral ischial ulcers with eschar. Wound  consulted and recommended frequent turning and low air loss mattress. Wound care re-evaluated on  and recommended general surgery to evaluate as concerned wounds appeared to be worsening.  · General surgery evaluated and at first did not feel intervention needed but ulcers continued ot worse so sharp debridement of ulcers done on  at bedside and abscess cavity and tunneling noted to right ischial ulcers. Patient was debrided to healthy tissue by general surgery.   · CT scan of pelvis done and no bone involvement noted with ulcers to suggest underlying osteomyelitis.   · Continue wound care as per surgery recs with packing with wet to dry gauze dressings BID and prn.   · No cultures were taken of abscess site.   · ID recommmended to continue IV Zosyn to treat until .   · Turn patient every 2 hours.         Seizure disorder  Chronic and controlled. EEG done on this admit showed no status epilepticus. Continue Phenobarbital 100 mg nightly via G tube to treat as patient takes at home       Diarrhea  Proctocolitis  · C dff negative on admit.  · Diarrhea worsened in hospital. Patient with similar episode in 2020 so GI consulted for evaluation of diarrhea. Repeat CT scan on this admit on  showed persistent proctocolitis as seen on previous imaging.   · GI performed flex sig on  to evaluate and showed no gross abnormalities, biopsies taken. Repeat stool studies on this admit negative for infectious etiology.   · Treat symptomatically with Imodium prn and Lactobacillus daily.     Anemia of chronic disease  Acute blood loss anemia  · Chronic and controlled. Monitor with daily CBC and transfuse if Hgb < 7.   · Hgb dropped to 6.7 on  and  patient transfused 1 unit of PRBCs. FOBT done and heme positive. GI consulted and flex sig done on 9/9 and unremarkable.  · Hgb stable after blood transfusion on 8/8 and stable at 10 on 9/10.   · Monitor with daily CBC and transfuse if Hgb < 7.       Severe protein-calorie malnutrition  · Patient with obvious muscle wasting and BMI of 15.8. Nutrition consulted on admit.   · Patient started on TF with Peptamen 1.5 with prebio at 40 cc/hr and continued in hospital and nutrition following. .       Cerebral palsy  Patient with poor functional baseline and bed bound with extremity contractures and total care at home.       VTE Risk Mitigation (From admission, onward)         Ordered     heparin (porcine) injection 5,000 Units  Every 8 hours      09/09/20 1644     Place sequential compression device  Until discontinued      08/26/20 0210     IP VTE LOW RISK PATIENT  Once      08/26/20 0210                Discharge Planning   ROCÍO: 9/14/2020     Code Status: Full Code   Is the patient medically ready for discharge?: No    Reason for patient still in hospital (select all that apply): Patient trending condition  Discharge Plan A: Home with family   Discharge Delays: None known at this time              Nohemi Farris MD  Department of Hospital Medicine   [facility]

## 2020-09-12 LAB
BASOPHILS # BLD AUTO: 0.03 K/UL (ref 0–0.2)
BASOPHILS NFR BLD: 0.4 % (ref 0–1.9)
DIFFERENTIAL METHOD: ABNORMAL
EOSINOPHIL # BLD AUTO: 0.3 K/UL (ref 0–0.5)
EOSINOPHIL NFR BLD: 3.5 % (ref 0–8)
ERYTHROCYTE [DISTWIDTH] IN BLOOD BY AUTOMATED COUNT: 17.1 % (ref 11.5–14.5)
HCT VFR BLD AUTO: 29.1 % (ref 40–54)
HGB BLD-MCNC: 8.7 G/DL (ref 14–18)
IMM GRANULOCYTES # BLD AUTO: 0.02 K/UL (ref 0–0.04)
IMM GRANULOCYTES NFR BLD AUTO: 0.2 % (ref 0–0.5)
LYMPHOCYTES # BLD AUTO: 1.5 K/UL (ref 1–4.8)
LYMPHOCYTES NFR BLD: 18.5 % (ref 18–48)
MCH RBC QN AUTO: 29.8 PG (ref 27–31)
MCHC RBC AUTO-ENTMCNC: 29.9 G/DL (ref 32–36)
MCV RBC AUTO: 100 FL (ref 82–98)
MONOCYTES # BLD AUTO: 1.2 K/UL (ref 0.3–1)
MONOCYTES NFR BLD: 15 % (ref 4–15)
NEUTROPHILS # BLD AUTO: 5 K/UL (ref 1.8–7.7)
NEUTROPHILS NFR BLD: 62.4 % (ref 38–73)
NRBC BLD-RTO: 0 /100 WBC
PLATELET # BLD AUTO: 759 K/UL (ref 150–350)
PMV BLD AUTO: 9.3 FL (ref 9.2–12.9)
RBC # BLD AUTO: 2.92 M/UL (ref 4.6–6.2)
WBC # BLD AUTO: 8.05 K/UL (ref 3.9–12.7)

## 2020-09-12 PROCEDURE — 25000003 PHARM REV CODE 250: Performed by: PHYSICIAN ASSISTANT

## 2020-09-12 PROCEDURE — 94668 MNPJ CHEST WALL SBSQ: CPT

## 2020-09-12 PROCEDURE — 36415 COLL VENOUS BLD VENIPUNCTURE: CPT

## 2020-09-12 PROCEDURE — 11000001 HC ACUTE MED/SURG PRIVATE ROOM

## 2020-09-12 PROCEDURE — 94761 N-INVAS EAR/PLS OXIMETRY MLT: CPT

## 2020-09-12 PROCEDURE — 25000003 PHARM REV CODE 250: Performed by: STUDENT IN AN ORGANIZED HEALTH CARE EDUCATION/TRAINING PROGRAM

## 2020-09-12 PROCEDURE — 25000003 PHARM REV CODE 250: Performed by: INTERNAL MEDICINE

## 2020-09-12 PROCEDURE — 85025 COMPLETE CBC W/AUTO DIFF WBC: CPT

## 2020-09-12 PROCEDURE — 25000003 PHARM REV CODE 250: Performed by: HOSPITALIST

## 2020-09-12 PROCEDURE — 99232 SBSQ HOSP IP/OBS MODERATE 35: CPT | Mod: ,,, | Performed by: INTERNAL MEDICINE

## 2020-09-12 PROCEDURE — 99232 PR SUBSEQUENT HOSPITAL CARE,LEVL II: ICD-10-PCS | Mod: ,,, | Performed by: INTERNAL MEDICINE

## 2020-09-12 PROCEDURE — 99900035 HC TECH TIME PER 15 MIN (STAT)

## 2020-09-12 PROCEDURE — 63600175 PHARM REV CODE 636 W HCPCS: Performed by: HOSPITALIST

## 2020-09-12 RX ADMIN — PROPRANOLOL HYDROCHLORIDE 20 MG: 20 TABLET ORAL at 03:09

## 2020-09-12 RX ADMIN — DIPHENHYDRAMINE HYDROCHLORIDE 12.5 MG: 25 SOLUTION ORAL at 09:09

## 2020-09-12 RX ADMIN — LOPERAMIDE HYDROCHLORIDE 2 MG: 1 SOLUTION ORAL at 05:09

## 2020-09-12 RX ADMIN — BACLOFEN 10 MG: 10 TABLET ORAL at 10:09

## 2020-09-12 RX ADMIN — LOPERAMIDE HYDROCHLORIDE 2 MG: 1 SOLUTION ORAL at 09:09

## 2020-09-12 RX ADMIN — BACLOFEN 10 MG: 10 TABLET ORAL at 03:09

## 2020-09-12 RX ADMIN — PIPERACILLIN SODIUM AND TAZOBACTAM SODIUM 4.5 G: 4; .5 INJECTION, POWDER, LYOPHILIZED, FOR SOLUTION INTRAVENOUS at 09:09

## 2020-09-12 RX ADMIN — HEPARIN SODIUM 5000 UNITS: 5000 INJECTION INTRAVENOUS; SUBCUTANEOUS at 09:09

## 2020-09-12 RX ADMIN — PHENOBARBITAL 100 MG: 20 ELIXIR ORAL at 09:09

## 2020-09-12 RX ADMIN — PIPERACILLIN SODIUM AND TAZOBACTAM SODIUM 4.5 G: 4; .5 INJECTION, POWDER, LYOPHILIZED, FOR SOLUTION INTRAVENOUS at 05:09

## 2020-09-12 RX ADMIN — PROPRANOLOL HYDROCHLORIDE 20 MG: 20 TABLET ORAL at 09:09

## 2020-09-12 RX ADMIN — PIPERACILLIN SODIUM AND TAZOBACTAM SODIUM 4.5 G: 4; .5 INJECTION, POWDER, LYOPHILIZED, FOR SOLUTION INTRAVENOUS at 12:09

## 2020-09-12 RX ADMIN — LOPERAMIDE HYDROCHLORIDE 2 MG: 1 SOLUTION ORAL at 02:09

## 2020-09-12 RX ADMIN — HEPARIN SODIUM 5000 UNITS: 5000 INJECTION INTRAVENOUS; SUBCUTANEOUS at 02:09

## 2020-09-12 RX ADMIN — BACLOFEN 10 MG: 10 TABLET ORAL at 09:09

## 2020-09-12 RX ADMIN — Medication 1 CAPSULE: at 10:09

## 2020-09-12 RX ADMIN — GABAPENTIN 250 MG: 250 SOLUTION ORAL at 09:09

## 2020-09-12 RX ADMIN — LOPERAMIDE HYDROCHLORIDE 2 MG: 1 SOLUTION ORAL at 10:09

## 2020-09-12 RX ADMIN — PROPRANOLOL HYDROCHLORIDE 20 MG: 20 TABLET ORAL at 10:09

## 2020-09-12 NOTE — ASSESSMENT & PLAN NOTE
Proctocolitis  · Improved with scheduled Imodium and will continue.   · C dff negative on admit.  · Diarrhea worsened in hospital. Patient with similar episode in 8/2020 so GI consulted for evaluation of diarrhea. Repeat CT scan on this admit on 9/4 showed persistent proctocolitis and inflammatory changes in colon area as seen on previous imaging.   · GI consulted and performed flex sig on 9/9 to evaluate and showed no gross abnormalities, biopsies taken. Repeat stool studies on this admit negative for infectious etiology. Pathology from colon biopsies pending and need to follow-up results.   · As per GI, give trial of scheduled Imodium to see if helps so will try Imodium 2 mg po 4 times daily. If that fails then could consider trial of cholestyramine.

## 2020-09-12 NOTE — PROGRESS NOTES
[facility] Medicine  Progress Note    Patient Name: Glen Moscoso  MRN: [id number]  Patient Class: IP- Inpatient   Admission Date: 8/26/2020  Length of Stay: 17 days  Attending Physician: Nohemi Farris MD  Primary Care Provider: Yadi Lawson MD        Subjective:     Principal Problem:Pneumonia due to Pseudomonas species        HPI:  23 yo M with cerebral palsy, Seizures, and recent admission for hyponatremia who presents to [facility] for encephalopathy, sepsis, diarrhea, and possible seizure. He presented to OSH after being found unresponsive and in resp distress. He was found down by his mother upon EMS arrival he had agonal resp 3-4 per min, he was intubated by EMS and take to OSH ED. He was hypotensive on arrival started on Levo gtt, received antibiotics, ABG showed metabolic acidosis. He is being admitted to [facility] for a higher level of care, EGG and close monitoring.   Recently admitted [facility] from August 4-August 18 with hypokalemia, colitis, left buttock decubitus ulcer, fever of uncertain etiology, aspiration pneumonitis, leukocytosis, diarrhea, tachypnea, sinus tachycardia.  Serum bicarbonate August 11-18 ranged 13-17.  Multiple cultures to include respiratory cultures, blood cultures, and stool cultures (including C diff) did not have acute abnormalities.  COVID on August 9 was negative.  He was treated with p.o. vancomycin along with Levaquin and Flagyl.    Overview/Hospital Course:  Patient admitted to Neuro ICU and started on broad spectrum antibiotics (Vancomcyin and Zosyn) for his leukocytosis and fevers and elevated procalcitonin of 6. Patient was pancultured with blood cultures showing NGTD, urine cx NGTD and C diff negative. Respiratory cultures grew Serratia marcescens and Pseudomonas aeruginosa. After respiratory cultures returned Vancomycin discontinued and patient continued on IV Zosyn. Patient was extubated on 8/27 and able to be weaned to room air while in  Neuro ICU. At baseline he is nonverbal and minimally interactive. Will not follow commands. EEG was done on 8/27 which showed diffuse disorganized slowing of the background which was very nonspecific. No seizures recorded in this study. Patient restarted on home TF via PEG after extubation and free water flushes via PEG for hypernatremia noted on 8/29 of 150 with improvement. Patient was stepped down from Neuro ICU on evening of 8/29. After stepdown on evening of 8/29 patient with temperature of 102 F. Rapid response called but no new orders given. Prior to stepdown from Neuo ICU patient switched from IV Zosyn to Cipro after sensitivities returned from respiratory cultures. Patient on 8/30 had another rapid response called as patient developed stridor and labored breathing. Patient given Albuterol nebulizer treatment and nebulized racemic Epinephrine with improvement. Dr. Hitchcock and Neuro ICU team came to bedside and patient did improve after racemic Epi. Patient had ABG done that showed 7.45/31/101/98%. Stridor felt to be mechanical in nature as improved when patient sat up and when his chin was pulled up. Patien sent back to Neuro ICU for close monitoring. Patient did not require reintubation and was aggressively suctioned, placed on cough assist and CPT. Stridor felt mechanical as improved with jaw thrust and holding patient's jaw closed. Patient stepped back down to floor on 9/3. Wound care following patient and patient does have several areas of skin breakdown and most notably unstageable eschar to bilateral ischium and wound care recommending general surgery consult for evaluation and general surgery consulted on step down to floor. Patient's repeat sputum again grew Serratia and Pseudomonas so ID consulted and recommended resuming IV Zosyn to treat pneumonia and to cover wound and recommended to continue until 9/17. Bilateral ischial ulcers with eschar were sharply debrided bedside using #10 scalpel to  healthy bleeding tissue on 9/7. The right ulcer had underlying abscess cavity that tracked inferior and laterally about 2cm. The bases of both ulcers had fibrinous exudate debrided. Both sides packed with wet-to-dry gauze dressings. Patient after stepdown had issues with diarrhea and abdominal pain which was not new as patient had a recent hospitalization at [facility] in 8/2020 for treatment for colitis. GI performed flex sig on  On 9/9 and not acute abnormalities noted and random biopsies taken. Repeat stool studies done and negative for infectious etiology for diarrhea. ID reassessed patient on 9/10 and recommending to extend IV Zosyn until 9/24 to cover infected ischial orders. Diarrhea improving on 9/11. Rectal tube place to help prevent contamination of open wounds on bilateral ischium. Patient with decreased stool since started on scheduled Imodium on 9/11.     Interval History: Patient with improved diarrhea now on scheduled Imodium. Patient with no acute events overnight. Anticipate will be ready for home discharge for 9/15 and discussed with patient's mother who is also a patient of mine in hospital. Plan to have PICC line placed for home IV abx on 9/14.     Review of Systems   Unable to perform ROS: Patient nonverbal     Objective:     Vital Signs (Most Recent):  Temp: 98.6 °F (37 °C) (09/12/20 1100)  Pulse: 95 (09/12/20 1100)  Resp: 18 (09/12/20 1100)  BP: 136/62 (09/12/20 1100)  SpO2: 97 % (09/12/20 0848) Vital Signs (24h Range):  Temp:  [98.5 °F (36.9 °C)-99.4 °F (37.4 °C)] 98.6 °F (37 °C)  Pulse:  [] 95  Resp:  [18] 18  SpO2:  [94 %-98 %] 97 %  BP: (110-136)/(62-68) 136/62     Weight: 33.1 kg (73 lb)  Body mass index is 15.8 kg/m².    Intake/Output Summary (Last 24 hours) at 9/12/2020 1259  Last data filed at 9/11/2020 2000  Gross per 24 hour   Intake 460 ml   Output 400 ml   Net 60 ml      Physical Exam  Vitals signs and nursing note reviewed.   Constitutional:       General: He is not in acute  distress.     Appearance: He is cachectic. He is ill-appearing (Chronic).   Eyes:      Conjunctiva/sclera: Conjunctivae normal.   Neck:      Trachea: No abnormal tracheal secretions.   Cardiovascular:      Rate and Rhythm: Normal rate and regular rhythm.      Heart sounds: Normal heart sounds. No murmur. No gallop.    Pulmonary:      Effort: Pulmonary effort is normal. No respiratory distress.      Breath sounds: Normal breath sounds. No wheezing or rales.   Abdominal:      General: Abdomen is flat. Bowel sounds are normal. There is no distension.      Palpations: Abdomen is soft.      Comments: PEG tube in place   Neurological:      Mental Status: He is alert.      Comments: Contractures to all extremities noted         Significant Labs:   CBC:   Recent Labs   Lab 09/11/20  0509 09/12/20  0407   WBC 7.71 8.05   HGB 8.9* 8.7*   HCT 29.9* 29.1*   * 759*       Significant Imaging: I have reviewed all pertinent imaging results/findings within the past 24 hours.      Assessment/Plan:      * Pneumonia due to Pseudomonas aerginuosa and Serratia marcesens  · Improving. Patient not requiring oxygen and respiratory status is stable.   · Patient admitted with acute hypoxic respiratory failure and sepsis. Blood and urine cultures negative and C. Diff negative but respiratory culture grew Serratia and Pseudomonas. Patient treated with IV Zosyn and switched to IV Cipro and treatment stopped on 9/3 as presumably had completed treatment course for pneumonia but continued to spike fever and repeat sputum cultures on 9/6 again grew Serratia so ID consulted on 9/3 and recommending resuming IV Zosyn to treat. ID recommending to extend IV Zosyn until 9/24 to cover both wounds and pneumonia.   · Patient intubated on admit to Neuro ICU and required mechanical ventilation intimally but able to be extubated and weaned to room air and remains on room air. Patient now on room air and breathing on his own.       Decubitus ulcer of  ischial area, left, stage IV  Pressure injury of right ischium, stage 4  · Noted on admit to have bilateral ischial ulcers with eschar. Wound  consulted and recommended frequent turning and low air loss mattress. Wound care re-evaluated on  and recommended general surgery to evaluate as concerned wounds appeared to be worsening.  · General surgery evaluated and at first did not feel intervention needed but ulcers continued ot worse so sharp debridement of ulcers done on  at bedside and abscess cavity and tunneling noted to right ischial ulcers that was drained by General surgery. Patient was debrided to healthy tissue by surgery.  · CT scan of pelvis done and no bone involvement noted with ulcers to suggest underlying osteomyelitis.   · Continue wound care as per surgery recs with packing with wet to dry gauze dressings BID and prn.   · No cultures were taken of abscess site by surgery at time of debridement so no causative organism identiifed.   · ID recommmended to continue IV Zosyn to treat until 2020. Plan to have PICC line team place line on  in anticipation of home discharge on 9/15.   · Turn patient every 2 hours and low air loss mattress. Rectal tube to help prevent stool from getting into wounds.        Seizure disorder  Chronic and controlled. EEG done on this admit showed no status epilepticus. Continue Phenobarbital 100 mg nightly via G tube to treat as patient takes at home       Diarrhea  Proctocolitis  · Improved with scheduled Imodium and will continue.   · C dff negative on admit.  · Diarrhea worsened in hospital. Patient with similar episode in 2020 so GI consulted for evaluation of diarrhea. Repeat CT scan on this admit on  showed persistent proctocolitis and inflammatory changes in colon area as seen on previous imaging.   · GI consulted and performed flex sig on  to evaluate and showed no gross abnormalities, biopsies taken. Repeat stool studies on this admit negative  for infectious etiology. Pathology from colon biopsies pending and need to follow-up results.   · As per GI, give trial of scheduled Imodium to see if helps so will try Imodium 2 mg po 4 times daily. If that fails then could consider trial of cholestyramine.     Anemia of chronic disease  Acute blood loss anemia  · Chronic and controlled. Monitor with daily CBC and transfuse if Hgb < 7.   · Hgb dropped to 6.7 on 9/8 and patient transfused 1 unit of PRBCs. FOBT done and heme positive. GI consulted and flex sig done on 9/9 and unremarkable.  · Hgb stable after blood transfusion on 8/8 and stable at 9-10.  · Monitor with daily CBC and transfuse if Hgb < 7.       Severe protein-calorie malnutrition  · Patient with obvious muscle wasting and BMI of 15.8. Nutrition consulted on admit.   · Patient started on TF with Peptamen 1.5 with Prebio at 40 cc/hr and continued in hospital and nutrition following.      Cerebral palsy  Patient with poor functional baseline and bed bound with extremity contractures and total care at home. Patient at baseline function in hospital.         VTE Risk Mitigation (From admission, onward)         Ordered     heparin (porcine) injection 5,000 Units  Every 8 hours      09/09/20 1644     Place sequential compression device  Until discontinued      08/26/20 0210     IP VTE LOW RISK PATIENT  Once      08/26/20 0210                Discharge Planning   ROCÍO: 9/15/2020     Code Status: Full Code   Is the patient medically ready for discharge?: No    Reason for patient still in hospital (select all that apply): Patient trending condition  Discharge Plan A: Home with family   Discharge Delays: None known at this time              Nohemi Farris MD  Department of Hospital Medicine   [facility]

## 2020-09-12 NOTE — ASSESSMENT & PLAN NOTE
Pressure injury of right ischium, stage 4  · Noted on admit to have bilateral ischial ulcers with eschar. Wound  consulted and recommended frequent turning and low air loss mattress. Wound care re-evaluated on  and recommended general surgery to evaluate as concerned wounds appeared to be worsening.  · General surgery evaluated and at first did not feel intervention needed but ulcers continued ot worse so sharp debridement of ulcers done on  at bedside and abscess cavity and tunneling noted to right ischial ulcers that was drained by General surgery. Patient was debrided to healthy tissue by surgery.  · CT scan of pelvis done and no bone involvement noted with ulcers to suggest underlying osteomyelitis.   · Continue wound care as per surgery recs with packing with wet to dry gauze dressings BID and prn.   · No cultures were taken of abscess site by surgery at time of debridement so no causative organism identiifed.   · ID recommmended to continue IV Zosyn to treat until 2020. Plan to have PICC line team place line on  in anticipation of home discharge on 9/15.   · Turn patient every 2 hours and low air loss mattress. Rectal tube to help prevent stool from getting into wounds.

## 2020-09-12 NOTE — SUBJECTIVE & OBJECTIVE
Interval History: Patient with improved diarrhea now on scheduled Imodium. Patient with no acute events overnight. Anticipate will be ready for home discharge for 9/15 and discussed with patient's mother who is also a patient of mine in hospital. Plan to have PICC line placed for home IV abx on 9/14.     Review of Systems   Unable to perform ROS: Patient nonverbal     Objective:     Vital Signs (Most Recent):  Temp: 98.6 °F (37 °C) (09/12/20 1100)  Pulse: 95 (09/12/20 1100)  Resp: 18 (09/12/20 1100)  BP: 136/62 (09/12/20 1100)  SpO2: 97 % (09/12/20 0848) Vital Signs (24h Range):  Temp:  [98.5 °F (36.9 °C)-99.4 °F (37.4 °C)] 98.6 °F (37 °C)  Pulse:  [] 95  Resp:  [18] 18  SpO2:  [94 %-98 %] 97 %  BP: (110-136)/(62-68) 136/62     Weight: 33.1 kg (73 lb)  Body mass index is 15.8 kg/m².    Intake/Output Summary (Last 24 hours) at 9/12/2020 1259  Last data filed at 9/11/2020 2000  Gross per 24 hour   Intake 460 ml   Output 400 ml   Net 60 ml      Physical Exam  Vitals signs and nursing note reviewed.   Constitutional:       General: He is not in acute distress.     Appearance: He is cachectic. He is ill-appearing (Chronic).   Eyes:      Conjunctiva/sclera: Conjunctivae normal.   Neck:      Trachea: No abnormal tracheal secretions.   Cardiovascular:      Rate and Rhythm: Normal rate and regular rhythm.      Heart sounds: Normal heart sounds. No murmur. No gallop.    Pulmonary:      Effort: Pulmonary effort is normal. No respiratory distress.      Breath sounds: Normal breath sounds. No wheezing or rales.   Abdominal:      General: Abdomen is flat. Bowel sounds are normal. There is no distension.      Palpations: Abdomen is soft.      Comments: PEG tube in place   Neurological:      Mental Status: He is alert.      Comments: Contractures to all extremities noted         Significant Labs:   CBC:   Recent Labs   Lab 09/11/20  0509 09/12/20  0407   WBC 7.71 8.05   HGB 8.9* 8.7*   HCT 29.9* 29.1*   * 759*        Significant Imaging: I have reviewed all pertinent imaging results/findings within the past 24 hours.

## 2020-09-13 LAB
BASOPHILS # BLD AUTO: 0.02 K/UL (ref 0–0.2)
BASOPHILS NFR BLD: 0.3 % (ref 0–1.9)
DIFFERENTIAL METHOD: ABNORMAL
EOSINOPHIL # BLD AUTO: 0 K/UL (ref 0–0.5)
EOSINOPHIL NFR BLD: 0.1 % (ref 0–8)
ERYTHROCYTE [DISTWIDTH] IN BLOOD BY AUTOMATED COUNT: 16.7 % (ref 11.5–14.5)
HCT VFR BLD AUTO: 30.7 % (ref 40–54)
HGB BLD-MCNC: 9.4 G/DL (ref 14–18)
IMM GRANULOCYTES # BLD AUTO: 0.02 K/UL (ref 0–0.04)
IMM GRANULOCYTES NFR BLD AUTO: 0.3 % (ref 0–0.5)
LYMPHOCYTES # BLD AUTO: 1.2 K/UL (ref 1–4.8)
LYMPHOCYTES NFR BLD: 15.5 % (ref 18–48)
MCH RBC QN AUTO: 30.1 PG (ref 27–31)
MCHC RBC AUTO-ENTMCNC: 30.6 G/DL (ref 32–36)
MCV RBC AUTO: 98 FL (ref 82–98)
MONOCYTES # BLD AUTO: 0.8 K/UL (ref 0.3–1)
MONOCYTES NFR BLD: 10.7 % (ref 4–15)
NEUTROPHILS # BLD AUTO: 5.7 K/UL (ref 1.8–7.7)
NEUTROPHILS NFR BLD: 73.1 % (ref 38–73)
NRBC BLD-RTO: 0 /100 WBC
O+P STL MICRO: NORMAL
PLATELET # BLD AUTO: 859 K/UL (ref 150–350)
PMV BLD AUTO: 9.3 FL (ref 9.2–12.9)
RBC # BLD AUTO: 3.12 M/UL (ref 4.6–6.2)
WBC # BLD AUTO: 7.85 K/UL (ref 3.9–12.7)

## 2020-09-13 PROCEDURE — 11000001 HC ACUTE MED/SURG PRIVATE ROOM

## 2020-09-13 PROCEDURE — 85025 COMPLETE CBC W/AUTO DIFF WBC: CPT

## 2020-09-13 PROCEDURE — 25000003 PHARM REV CODE 250: Performed by: PHYSICIAN ASSISTANT

## 2020-09-13 PROCEDURE — 25000003 PHARM REV CODE 250: Performed by: STUDENT IN AN ORGANIZED HEALTH CARE EDUCATION/TRAINING PROGRAM

## 2020-09-13 PROCEDURE — 36415 COLL VENOUS BLD VENIPUNCTURE: CPT

## 2020-09-13 PROCEDURE — 99232 PR SUBSEQUENT HOSPITAL CARE,LEVL II: ICD-10-PCS | Mod: ,,, | Performed by: INTERNAL MEDICINE

## 2020-09-13 PROCEDURE — 94668 MNPJ CHEST WALL SBSQ: CPT

## 2020-09-13 PROCEDURE — 25000003 PHARM REV CODE 250: Performed by: INTERNAL MEDICINE

## 2020-09-13 PROCEDURE — 99900035 HC TECH TIME PER 15 MIN (STAT)

## 2020-09-13 PROCEDURE — 99232 SBSQ HOSP IP/OBS MODERATE 35: CPT | Mod: ,,, | Performed by: INTERNAL MEDICINE

## 2020-09-13 PROCEDURE — 63600175 PHARM REV CODE 636 W HCPCS: Performed by: HOSPITALIST

## 2020-09-13 PROCEDURE — 25000003 PHARM REV CODE 250: Performed by: HOSPITALIST

## 2020-09-13 PROCEDURE — 94761 N-INVAS EAR/PLS OXIMETRY MLT: CPT

## 2020-09-13 RX ADMIN — BACLOFEN 10 MG: 10 TABLET ORAL at 09:09

## 2020-09-13 RX ADMIN — LOPERAMIDE HYDROCHLORIDE 2 MG: 1 SOLUTION ORAL at 06:09

## 2020-09-13 RX ADMIN — PROPRANOLOL HYDROCHLORIDE 20 MG: 20 TABLET ORAL at 09:09

## 2020-09-13 RX ADMIN — PHENOBARBITAL 100 MG: 20 ELIXIR ORAL at 09:09

## 2020-09-13 RX ADMIN — BACLOFEN 10 MG: 10 TABLET ORAL at 04:09

## 2020-09-13 RX ADMIN — HEPARIN SODIUM 5000 UNITS: 5000 INJECTION INTRAVENOUS; SUBCUTANEOUS at 05:09

## 2020-09-13 RX ADMIN — PIPERACILLIN SODIUM AND TAZOBACTAM SODIUM 4.5 G: 4; .5 INJECTION, POWDER, LYOPHILIZED, FOR SOLUTION INTRAVENOUS at 09:09

## 2020-09-13 RX ADMIN — GABAPENTIN 250 MG: 250 SOLUTION ORAL at 09:09

## 2020-09-13 RX ADMIN — PROPRANOLOL HYDROCHLORIDE 20 MG: 20 TABLET ORAL at 04:09

## 2020-09-13 RX ADMIN — LOPERAMIDE HYDROCHLORIDE 2 MG: 1 SOLUTION ORAL at 09:09

## 2020-09-13 RX ADMIN — PIPERACILLIN SODIUM AND TAZOBACTAM SODIUM 4.5 G: 4; .5 INJECTION, POWDER, LYOPHILIZED, FOR SOLUTION INTRAVENOUS at 12:09

## 2020-09-13 RX ADMIN — Medication 1 CAPSULE: at 09:09

## 2020-09-13 RX ADMIN — LOPERAMIDE HYDROCHLORIDE 2 MG: 1 SOLUTION ORAL at 11:09

## 2020-09-13 RX ADMIN — PIPERACILLIN SODIUM AND TAZOBACTAM SODIUM 4.5 G: 4; .5 INJECTION, POWDER, LYOPHILIZED, FOR SOLUTION INTRAVENOUS at 04:09

## 2020-09-13 RX ADMIN — HEPARIN SODIUM 5000 UNITS: 5000 INJECTION INTRAVENOUS; SUBCUTANEOUS at 04:09

## 2020-09-13 RX ADMIN — LOPERAMIDE HYDROCHLORIDE 2 MG: 1 SOLUTION ORAL at 04:09

## 2020-09-13 RX ADMIN — DIPHENHYDRAMINE HYDROCHLORIDE 12.5 MG: 25 SOLUTION ORAL at 09:09

## 2020-09-13 RX ADMIN — HEPARIN SODIUM 5000 UNITS: 5000 INJECTION INTRAVENOUS; SUBCUTANEOUS at 09:09

## 2020-09-13 RX ADMIN — PIPERACILLIN SODIUM AND TAZOBACTAM SODIUM 4.5 G: 4; .5 INJECTION, POWDER, LYOPHILIZED, FOR SOLUTION INTRAVENOUS at 11:09
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
## 2020-09-13 NOTE — ASSESSMENT & PLAN NOTE
Proctocolitis  · Improved with scheduled Imodium and will continue at present as seems to be helping his diarrhea. Colon biopsies still pending.   · C dff negative on admit.  · Diarrhea worsened in hospital. Patient with similar episode in 8/2020 so GI consulted for evaluation of diarrhea. Repeat CT scan on this admit on 9/4 showed persistent proctocolitis and inflammatory changes in colon area as seen on previous imaging.   · GI consulted and performed flex sig on 9/9 to evaluate and showed no gross abnormalities, biopsies taken. Repeat stool studies on this admit negative for infectious etiology. Pathology from colon biopsies pending and need to follow-up results.   · As per GI, give trial of scheduled Imodium to see if helps so will try Imodium 2 mg po 4 times daily. If that fails then could consider trial of cholestyramine.

## 2020-09-13 NOTE — PLAN OF CARE
DX: Pneumonia due to pseudomonas aerginuosa and serrratia marcesens    Shift Events: No acute changes to assessment. Flexi seal remains in place     Plan of Care: continue abx therapy and wound care     Neuro: Alert     Respiratory: WNL on room air     Cardiac: NSR     Diet:  TF @ 40     Gtts: Zosyn

## 2020-09-14 LAB
ALBUMIN SERPL BCP-MCNC: 1.8 G/DL (ref 3.5–5.2)
ANION GAP SERPL CALC-SCNC: 7 MMOL/L (ref 8–16)
BASOPHILS # BLD AUTO: 0.03 K/UL (ref 0–0.2)
BASOPHILS NFR BLD: 0.3 % (ref 0–1.9)
BUN SERPL-MCNC: 7 MG/DL (ref 6–20)
CALCIUM SERPL-MCNC: 8.5 MG/DL (ref 8.7–10.5)
CHLORIDE SERPL-SCNC: 106 MMOL/L (ref 95–110)
CO2 SERPL-SCNC: 26 MMOL/L (ref 23–29)
CREAT SERPL-MCNC: 0.5 MG/DL (ref 0.5–1.4)
DIFFERENTIAL METHOD: ABNORMAL
EOSINOPHIL # BLD AUTO: 0.4 K/UL (ref 0–0.5)
EOSINOPHIL NFR BLD: 4.9 % (ref 0–8)
ERYTHROCYTE [DISTWIDTH] IN BLOOD BY AUTOMATED COUNT: 16.6 % (ref 11.5–14.5)
EST. GFR  (AFRICAN AMERICAN): >60 ML/MIN/1.73 M^2
EST. GFR  (NON AFRICAN AMERICAN): >60 ML/MIN/1.73 M^2
GLUCOSE SERPL-MCNC: 107 MG/DL (ref 70–110)
HCT VFR BLD AUTO: 30 % (ref 40–54)
HGB BLD-MCNC: 8.8 G/DL (ref 14–18)
IMM GRANULOCYTES # BLD AUTO: 0.03 K/UL (ref 0–0.04)
IMM GRANULOCYTES NFR BLD AUTO: 0.3 % (ref 0–0.5)
LYMPHOCYTES # BLD AUTO: 1.4 K/UL (ref 1–4.8)
LYMPHOCYTES NFR BLD: 15.9 % (ref 18–48)
MCH RBC QN AUTO: 29.2 PG (ref 27–31)
MCHC RBC AUTO-ENTMCNC: 29.3 G/DL (ref 32–36)
MCV RBC AUTO: 100 FL (ref 82–98)
MONOCYTES # BLD AUTO: 1 K/UL (ref 0.3–1)
MONOCYTES NFR BLD: 11.7 % (ref 4–15)
NEUTROPHILS # BLD AUTO: 5.8 K/UL (ref 1.8–7.7)
NEUTROPHILS NFR BLD: 66.9 % (ref 38–73)
NRBC BLD-RTO: 0 /100 WBC
PHOSPHATE SERPL-MCNC: 2.9 MG/DL (ref 2.7–4.5)
PLATELET # BLD AUTO: 797 K/UL (ref 150–350)
PMV BLD AUTO: 9.2 FL (ref 9.2–12.9)
POTASSIUM SERPL-SCNC: 3.9 MMOL/L (ref 3.5–5.1)
RBC # BLD AUTO: 3.01 M/UL (ref 4.6–6.2)
SODIUM SERPL-SCNC: 139 MMOL/L (ref 136–145)
WBC # BLD AUTO: 8.73 K/UL (ref 3.9–12.7)

## 2020-09-14 PROCEDURE — 36415 COLL VENOUS BLD VENIPUNCTURE: CPT

## 2020-09-14 PROCEDURE — 25000003 PHARM REV CODE 250: Performed by: STUDENT IN AN ORGANIZED HEALTH CARE EDUCATION/TRAINING PROGRAM

## 2020-09-14 PROCEDURE — 99232 SBSQ HOSP IP/OBS MODERATE 35: CPT | Mod: ,,, | Performed by: INTERNAL MEDICINE

## 2020-09-14 PROCEDURE — 63600175 PHARM REV CODE 636 W HCPCS: Performed by: HOSPITALIST

## 2020-09-14 PROCEDURE — C1751 CATH, INF, PER/CENT/MIDLINE: HCPCS

## 2020-09-14 PROCEDURE — 99232 PR SUBSEQUENT HOSPITAL CARE,LEVL II: ICD-10-PCS | Mod: ,,, | Performed by: INTERNAL MEDICINE

## 2020-09-14 PROCEDURE — 94668 MNPJ CHEST WALL SBSQ: CPT

## 2020-09-14 PROCEDURE — 80069 RENAL FUNCTION PANEL: CPT

## 2020-09-14 PROCEDURE — 99900035 HC TECH TIME PER 15 MIN (STAT)

## 2020-09-14 PROCEDURE — 25000003 PHARM REV CODE 250: Performed by: INTERNAL MEDICINE

## 2020-09-14 PROCEDURE — 25000003 PHARM REV CODE 250: Performed by: PHYSICIAN ASSISTANT

## 2020-09-14 PROCEDURE — 25000003 PHARM REV CODE 250: Performed by: HOSPITALIST

## 2020-09-14 PROCEDURE — 85025 COMPLETE CBC W/AUTO DIFF WBC: CPT

## 2020-09-14 PROCEDURE — 36410 VNPNXR 3YR/> PHY/QHP DX/THER: CPT

## 2020-09-14 PROCEDURE — 94761 N-INVAS EAR/PLS OXIMETRY MLT: CPT

## 2020-09-14 PROCEDURE — 97803 MED NUTRITION INDIV SUBSEQ: CPT

## 2020-09-14 PROCEDURE — 76937 US GUIDE VASCULAR ACCESS: CPT

## 2020-09-14 PROCEDURE — 11000001 HC ACUTE MED/SURG PRIVATE ROOM

## 2020-09-14 PROCEDURE — 63600175 PHARM REV CODE 636 W HCPCS: Performed by: INTERNAL MEDICINE

## 2020-09-14 RX ORDER — ASCORBIC ACID 250 MG
500 TABLET ORAL DAILY
Status: DISCONTINUED | OUTPATIENT
Start: 2020-09-15 | End: 2020-09-18 | Stop reason: HOSPADM

## 2020-09-14 RX ADMIN — HEPARIN SODIUM 5000 UNITS: 5000 INJECTION INTRAVENOUS; SUBCUTANEOUS at 05:09

## 2020-09-14 RX ADMIN — LOPERAMIDE HYDROCHLORIDE 2 MG: 1 SOLUTION ORAL at 09:09

## 2020-09-14 RX ADMIN — Medication 1 CAPSULE: at 08:09

## 2020-09-14 RX ADMIN — PIPERACILLIN SODIUM AND TAZOBACTAM SODIUM 4.5 G: 4; .5 INJECTION, POWDER, LYOPHILIZED, FOR SOLUTION INTRAVENOUS at 08:09

## 2020-09-14 RX ADMIN — BACLOFEN 10 MG: 10 TABLET ORAL at 04:09

## 2020-09-14 RX ADMIN — LOPERAMIDE HYDROCHLORIDE 2 MG: 1 SOLUTION ORAL at 08:09

## 2020-09-14 RX ADMIN — PROPRANOLOL HYDROCHLORIDE 20 MG: 20 TABLET ORAL at 03:09

## 2020-09-14 RX ADMIN — PROPRANOLOL HYDROCHLORIDE 20 MG: 20 TABLET ORAL at 09:09

## 2020-09-14 RX ADMIN — BACLOFEN 10 MG: 10 TABLET ORAL at 08:09

## 2020-09-14 RX ADMIN — HEPARIN SODIUM 5000 UNITS: 5000 INJECTION INTRAVENOUS; SUBCUTANEOUS at 03:09

## 2020-09-14 RX ADMIN — GABAPENTIN 250 MG: 250 SOLUTION ORAL at 09:09

## 2020-09-14 RX ADMIN — BACLOFEN 10 MG: 10 TABLET ORAL at 09:09

## 2020-09-14 RX ADMIN — HEPARIN SODIUM 5000 UNITS: 5000 INJECTION INTRAVENOUS; SUBCUTANEOUS at 09:09

## 2020-09-14 RX ADMIN — LOPERAMIDE HYDROCHLORIDE 2 MG: 1 SOLUTION ORAL at 12:09

## 2020-09-14 RX ADMIN — PROPRANOLOL HYDROCHLORIDE 20 MG: 20 TABLET ORAL at 08:09

## 2020-09-14 RX ADMIN — LOPERAMIDE HYDROCHLORIDE 2 MG: 1 SOLUTION ORAL at 04:09

## 2020-09-14 RX ADMIN — PHENOBARBITAL 100 MG: 20 ELIXIR ORAL at 09:09

## 2020-09-14 RX ADMIN — PIPERACILLIN SODIUM AND TAZOBACTAM SODIUM 4.5 G: 4; .5 INJECTION, POWDER, LYOPHILIZED, FOR SOLUTION INTRAVENOUS at 04:09

## 2020-09-14 NOTE — CONSULTS
Single lumen 18g x 8cm midline placed to left basilic vein. Max dwell date 10/13/2020, Lot# [id number].  Needle advanced into the vessel under real time ultrasound guidance.  Image recorded and saved.

## 2020-09-14 NOTE — ASSESSMENT & PLAN NOTE
· Patient with obvious muscle wasting and BMI of 15.8. Nutrition consulted on admit and following patient in hospital and providing recommendations.   · Patient started on TF with Peptamen 1.5 with Prebio at 40 cc/hr and continued in hospital and nutrition following.  · Nutrition recommended adding Zinc, MVI and Vitamin C to help with wound healing. MVI should contain enough Zinc for wound healing and will add Vit C 500 mg via PEG with MVI once daily via PEG on 9/15. Zinc only comes in capsules so cannot give to patient as has a PEG tube and strict NPO.

## 2020-09-14 NOTE — PLAN OF CARE
DX: Pneumonia due to pseudomonas aerginuosa and serrratia marcesens     Shift Events: No acute changes to assessment. Flexi seal remains in place. No bm during night.     Plan of Care: continue abx therapy and wound care     Neuro: Alert, responds to voice     Respiratory: WNL on room air     Cardiac: NSR     Diet:  TF @ 40     Gtts: Zosyn

## 2020-09-14 NOTE — NURSING
Pt global aphasic, cerebral palsy,flex seal,external catheter, Peg tube with  peptamen 1.5/ prebio @ 40 continuous feeding,hob elevated,wheels locked, suction at the bedside, will continue to monitor.

## 2020-09-14 NOTE — PLAN OF CARE
09/14/20 1344   Discharge Reassessment   Assessment Type Discharge Planning Reassessment   Provided patient/caregiver education on the expected discharge date and the discharge plan Yes   Do you have any problems affording any of your prescribed medications? No   Discharge Plan A Home Health;Home with family   Discharge Plan B Home with family   DME Needed Upon Discharge  none   Patient choice form signed by patient/caregiver N/A   Anticipated Discharge Disposition IV Therapy   Can the patient/caregiver answer the patient profile reliably? No, cognitively impaired   Describe the patient's ability to walk at the present time. Does not walk or unable to take any steps at all   How often would a person be available to care for the patient? Whenever needed   Post-Acute Status   Post-Acute Authorization Home Health   Home Health Status Awaiting Orders for HH   Discharge Delays None known at this time

## 2020-09-14 NOTE — ASSESSMENT & PLAN NOTE
Pressure injury of right ischium, stage 4  · Noted on admit to have bilateral ischial ulcers with eschar. Wound  consulted and recommended frequent turning and low air loss mattress. Wound care re-evaluated on  and recommended general surgery to evaluate as concerned wounds appeared to be worsening.  · General surgery evaluated and at first did not feel intervention needed but ulcers continued ot worse so sharp debridement of ulcers done on  at bedside and abscess cavity and tunneling noted to right ischial ulcers that was drained by General surgery. Patient was debrided to healthy tissue by surgery.  · CT scan of pelvis done and no bone involvement noted with ulcers to suggest underlying osteomyelitis.   · Continue wound care as per surgery recs with packing with wet to dry gauze dressings BID and prn.   · No cultures were taken of abscess site by surgery at time of debridement so no causative organism identiifed.   · ID recommmended to continue IV Zosyn 4.5 grams IV every 8 hours to treat until 2020. Plan to have PICC line team place line on  in anticipation of home discharge this week.   · Turn patient every 2 hours and low air loss mattress. Rectal tube to help prevent stool from getting into wounds.

## 2020-09-14 NOTE — PROGRESS NOTES
[facility] Medicine  Progress Note    Patient Name: Glen Moscoso  MRN: [id number]  Patient Class: IP- Inpatient   Admission Date: 8/26/2020  Length of Stay: 19 days  Attending Physician: Nohemi Farris MD  Primary Care Provider: Yadi Lawson MD        Subjective:     Principal Problem:Pneumonia due to Pseudomonas species        HPI:  25 y/o male with cerebral palsy, seizures, and recent admission for hyponatremia who presents to [facility] for encephalopathy, sepsis, diarrhea, and possible seizure. He presented to OSH after being found unresponsive and in resp distress. He was found down by his mother upon EMS arrival he had agonal resp 3-4 per min, he was intubated by EMS and take to OSH ED. He was hypotensive on arrival started on Levo gtt, received antibiotics, ABG showed metabolic acidosis. He is being admitted to [facility] for a higher level of care, EGG and close monitoring.   Recently admitted [facility] from August 4-August 18 with hypokalemia, colitis, left buttock decubitus ulcer, fever of uncertain etiology, aspiration pneumonitis, leukocytosis, diarrhea, tachypnea, sinus tachycardia. Serum bicarbonate August 11-18 ranged 13-17. Multiple cultures to include respiratory cultures, blood cultures, and stool cultures (including C diff) did not have acute abnormalities. COVID on August 9 was negative. He was treated with p.o. vancomycin along with Levaquin and Flagyl.    Overview/Hospital Course:  Patient admitted to Neuro ICU and started on broad spectrum antibiotics (Vancomcyin and Zosyn) for his leukocytosis and fevers and elevated procalcitonin of 6. Patient was pancultured with blood cultures showing NGTD, urine cx NGTD and C diff negative. Respiratory cultures grew Serratia marcescens and Pseudomonas aeruginosa. After respiratory cultures returned Vancomycin discontinued and patient continued on IV Zosyn. Patient was extubated on 8/27 and able to be weaned to room air while in  Neuro ICU. At baseline he is nonverbal and minimally interactive. Will not follow commands. EEG was done on 8/27 which showed diffuse disorganized slowing of the background which was very nonspecific. No seizures recorded in this study. Patient restarted on home TF via PEG after extubation and free water flushes via PEG for hypernatremia noted on 8/29 of 150 with improvement. Patient was stepped down from Neuro ICU on evening of 8/29. After stepdown on evening of 8/29 patient with temperature of 102 F. Rapid response called but no new orders given. Prior to stepdown from Neuo ICU patient switched from IV Zosyn to Cipro after sensitivities returned from respiratory cultures. Patient on 8/30 had another rapid response called as patient developed stridor and labored breathing. Patient given Albuterol nebulizer treatment and nebulized racemic Epinephrine with improvement. Dr. Hitchcock and Neuro ICU team came to bedside and patient did improve after racemic Epi. Patient had ABG done that showed 7.45/31/101/98%. Stridor felt to be mechanical in nature as improved when patient sat up and when his chin was pulled up. Patien sent back to Neuro ICU for close monitoring. Patient did not require reintubation and was aggressively suctioned, placed on cough assist and CPT. Stridor felt mechanical as improved with jaw thrust and holding patient's jaw closed. Patient stepped back down to floor on 9/3. Wound care following patient and patient does have several areas of skin breakdown and most notably unstageable eschar to bilateral ischium and wound care recommending general surgery consult for evaluation and general surgery consulted on step down to floor. Patient's repeat sputum again grew Serratia and Pseudomonas so ID consulted and recommended resuming IV Zosyn to treat pneumonia and to cover wound and recommended to continue until 9/17. Bilateral ischial ulcers with eschar were sharply debrided bedside using #10 scalpel to  healthy bleeding tissue on 9/7. The right ulcer had underlying abscess cavity that tracked inferior and laterally about 2cm. The bases of both ulcers had fibrinous exudate debrided. Both sides packed with wet-to-dry gauze dressings. Patient after stepdown had issues with diarrhea and abdominal pain which was not new as patient had a recent hospitalization at [facility] in 8/2020 for treatment for colitis. GI performed flex sig on  On 9/9 and not acute abnormalities noted and random biopsies taken. Repeat stool studies done and negative for infectious etiology for diarrhea. ID reassessed patient on 9/10 and recommending to extend IV Zosyn until 9/24 to cover infected ischial orders. Diarrhea improving on 9/11. Rectal tube place to help prevent contamination of open wounds on bilateral ischium. Patient with decreased stool since started on scheduled Imodium on 9/11. PICC line team consulted for placement of PICC on 9/14.     Interval History: Patient with no acute events overnight. Spoke with his mother Leni who is also a patient of mine and there is a concern as one of glen's caregivers at home tested positive for COVID so will be out for next 10 days so down to one caregiver at home. I told mother to reach out to [facility] that provides the caregivers to see if temporarily they can find a replacement as we cannot keep her son and her in hospital indefinitely and if there is no safe discharge plan in next couple of days will have to look into facility placement for Glen.     Review of Systems   Unable to perform ROS: Patient nonverbal     Objective:     Vital Signs (Most Recent):  Temp: 97.2 °F (36.2 °C) (09/14/20 1116)  Pulse: 88 (09/14/20 1151)  Resp: 16 (09/14/20 1116)  BP: 114/66 (09/14/20 1116)  SpO2: 98 % (09/14/20 0759) Vital Signs (24h Range):  Temp:  [97.2 °F (36.2 °C)-98.9 °F (37.2 °C)] 97.2 °F (36.2 °C)  Pulse:  [] 88  Resp:  [16-18] 16  SpO2:  [97 %-100 %] 98 %  BP: (109-130)/(58-80) 114/66      Weight: 33.1 kg (73 lb)  Body mass index is 15.8 kg/m².    Intake/Output Summary (Last 24 hours) at 9/14/2020 1523  Last data filed at 9/14/2020 1400  Gross per 24 hour   Intake 580 ml   Output 1600 ml   Net -1020 ml      Physical Exam  Vitals signs and nursing note reviewed.   Constitutional:       General: He is not in acute distress.     Appearance: He is cachectic. He is ill-appearing (Chronic).   Eyes:      Conjunctiva/sclera: Conjunctivae normal.   Neck:      Trachea: No abnormal tracheal secretions.   Cardiovascular:      Rate and Rhythm: Normal rate and regular rhythm.      Heart sounds: Normal heart sounds. No murmur. No gallop.    Pulmonary:      Effort: Pulmonary effort is normal. No respiratory distress.      Breath sounds: Normal breath sounds. No wheezing or rales.   Abdominal:      General: Abdomen is flat. Bowel sounds are normal. There is no distension.      Palpations: Abdomen is soft.      Comments: PEG tube in place   Neurological:      Mental Status: He is alert.      Comments: Contractures to all extremities noted         Significant Labs:   CBC:   Recent Labs   Lab 09/13/20  0537 09/14/20  0603   WBC 7.85 8.73   HGB 9.4* 8.8*   HCT 30.7* 30.0*   * 797*       Significant Imaging: I have reviewed all pertinent imaging results/findings within the past 24 hours.      Assessment/Plan:      * Pneumonia due to Pseudomonas aerginuosa and Serratia marcesens  · Improving. Patient not requiring oxygen and respiratory status is stable.   · Patient admitted with acute hypoxic respiratory failure and sepsis. Blood and urine cultures negative and C. Diff negative but respiratory culture grew Serratia and Pseudomonas. Patient treated with IV Zosyn and switched to IV Cipro and treatment stopped on 9/3 as presumably had completed treatment course for pneumonia but continued to spike fever and repeat sputum cultures on 9/6 again grew Serratia so ID consulted on 9/3 and recommending resuming IV Zosyn  to treat. ID recommending to extend IV Zosyn until  to cover both wounds and pneumonia.   · Patient intubated on admit to Neuro ICU and required mechanical ventilation intimally but able to be extubated and weaned to room air and remains on room air. Patient now on room air and breathing on his own.       Decubitus ulcer of ischial area, left, stage IV  Pressure injury of right ischium, stage 4  · Noted on admit to have bilateral ischial ulcers with eschar. Wound  consulted and recommended frequent turning and low air loss mattress. Wound care re-evaluated on  and recommended general surgery to evaluate as concerned wounds appeared to be worsening.  · General surgery evaluated and at first did not feel intervention needed but ulcers continued ot worse so sharp debridement of ulcers done on  at bedside and abscess cavity and tunneling noted to right ischial ulcers that was drained by General surgery. Patient was debrided to healthy tissue by surgery.  · CT scan of pelvis done and no bone involvement noted with ulcers to suggest underlying osteomyelitis.   · Continue wound care as per surgery recs with packing with wet to dry gauze dressings BID and prn.   · No cultures were taken of abscess site by surgery at time of debridement so no causative organism identiifed.   · ID recommmended to continue IV Zosyn 4.5 grams IV every 8 hours to treat until 2020. Plan to have PICC line team place line on  in anticipation of home discharge this week.   · Turn patient every 2 hours and low air loss mattress. Rectal tube to help prevent stool from getting into wounds.    Seizure disorder  Chronic and controlled. EEG done on this admit showed no status epilepticus. Continue Phenobarbital 100 mg nightly via G tube to treat as patient takes at home       Diarrhea  Proctocolitis  · Improved with scheduled Imodium and will continue at present as seems to be helping his diarrhea. Colon biopsies still pending.   · C  dff negative on admit.  · Diarrhea worsened in hospital. Patient with similar episode in 8/2020 so GI consulted for evaluation of diarrhea. Repeat CT scan on this admit on 9/4 showed persistent proctocolitis and inflammatory changes in colon area as seen on previous imaging.   · GI consulted and performed flex sig on 9/9 to evaluate and showed no gross abnormalities, biopsies taken. Repeat stool studies on this admit negative for infectious etiology. Pathology from colon biopsies pending and need to follow-up results.   · As per GI, give trial of scheduled Imodium to see if helps so will try Imodium 2 mg po 4 times daily. If that fails then could consider trial of cholestyramine.     Anemia of chronic disease  Acute blood loss anemia  · Chronic and controlled. Monitor with daily CBC and transfuse if Hgb < 7.   · Hgb dropped to 6.7 on 9/8 and patient transfused 1 unit of PRBCs. FOBT done and heme positive. GI consulted and flex sig done on 9/9 and unremarkable.  · Hgb stable after blood transfusion on 8/8 and stable at 9-10.  · Monitor with daily CBC and transfuse if Hgb < 7.       Severe protein-calorie malnutrition  · Patient with obvious muscle wasting and BMI of 15.8. Nutrition consulted on admit and following patient in hospital and providing recommendations.   · Patient started on TF with Peptamen 1.5 with Prebio at 40 cc/hr and continued in hospital and nutrition following.  · Nutrition recommended adding Zinc, MVI and Vitamin C to help with wound healing. MVI should contain enough Zinc for wound healing and will add Vit C 500 mg via PEG with MVI once daily via PEG on 9/15. Zinc only comes in capsules so cannot give to patient as has a PEG tube and strict NPO.       Cerebral palsy  Patient with poor functional baseline and bed bound with extremity contractures and total care at home. Patient at baseline function in hospital.         VTE Risk Mitigation (From admission, onward)         Ordered     heparin  (porcine) injection 5,000 Units  Every 8 hours      09/09/20 1644     Place sequential compression device  Until discontinued      08/26/20 0210     IP VTE LOW RISK PATIENT  Once      08/26/20 0210                Discharge Planning   ROCÍO: 9/17/2020     Code Status: Full Code   Is the patient medically ready for discharge?: No    Reason for patient still in hospital (select all that apply): Patient trending condition  Discharge Plan A: Home Health, Home with family   Discharge Delays: None known at this time              Nohemi Farris MD  Department of Hospital Medicine   [facility]

## 2020-09-14 NOTE — PROGRESS NOTES
"[facility]  Adult Nutrition  Progress Note    SUMMARY       Recommendations    1. Suggest TF of Peptamen 1.5 with prebio @ 20 mL/hr increase to goal rate of 40 mL/hr to provide pt with 1440 kcal, 65 g protein and 739 mL free water.               -Additional water per MD. Hold for residuals >500 mL.   2. Continue lactobacillus rhamnosus 2/2 diarrhea   3. Add Vitamin C, zinc and MVI to aid in wound healing      RD to monitor and follow up    Goals: pt to receive nutrition by RD follow up  Nutrition Goal Status: goal met  Communication of RD Recs: (POC)    Reason for Assessment    Reason For Assessment: RD follow-up  Diagnosis: (Encephalopathy acute)  Relevant Medical History: Cerebral palsy; Seizures; PEG; Acid reflux  Interdisciplinary Rounds: did not attend  General Information Comments: Pt resting in bed with no family members, sitter at bedside. Pt continues to tolerated TF @ 40 mL/hr. Wounds noted. Wt stable since admit. NFPE completed , pt meets criteria for malnutrition - improving with TF.   Nutrition Discharge Planning: adequate intake via TFs    Nutrition Risk Screen    Nutrition Risk Screen: tube feeding or parenteral nutrition    Nutrition/Diet History    Spiritual, Cultural Beliefs, Nondenominational Practices, Values that Affect Care: no  Food Allergies: NKFA  Factors Affecting Nutritional Intake: NPO  Nutrition Support Formula Prior to Admit: Other (see comments)(Ensure 4 cans/day)    Anthropometrics    Temp: 97.7 °F (36.5 °C)  Height Method: Stated  Height: 4' 9" (144.8 cm)  Height (inches): 57 in  Weight Method: Bed Scale  Weight: 33.1 kg (73 lb)  Weight (lb): 73 lb  Ideal Body Weight (IBW), Male: 88 lb  % Ideal Body Weight, Male (lb): 82.95 %  BMI (Calculated): 15.8  BMI Grade: less than 16 protein-energy malnutrition grade III  Usual Body Weight (UBW), k.64 kg  % Usual Body Weight: 85.87     Lab/Procedures/Meds    Pertinent Labs Reviewed: reviewed  Pertinent Labs Comments: " noted  Pertinent Medications Reviewed: reviewed  Pertinent Medications Comments: lactobacillus, propranolol    Estimated/Assessed Needs    Weight Used For Calorie Calculations: 33.1 kg (72 lb 15.6 oz)  Energy Calorie Requirements (kcal): 1412 kcal/day  Energy Need Method: Eliot-St Jeor(x 1.25)  Protein Requirements: 53-66 gm/day  Weight Used For Protein Calculations: 33.1 kg (72 lb 15.6 oz)  Fluid Requirements (mL): 1 mL/kcal or per MD     RDA Method (mL): 1412     Nutrition Prescription Ordered    Current Diet Order: NPO  Current Nutrition Support Formula Ordered: Peptamen 1.5 w/Prebio  Current Nutrition Support Rate Ordered: 40 (ml)    Evaluation of Received Nutrient/Fluid Intake    Enteral Calories (kcal): 1440  Enteral Protein (gm): 65  Enteral (Free Water) Fluid (mL): 741  % Kcal Needs: 102  % Protein Needs: 100  I/O: +11.556L since 8/31  Energy Calories Required: meeting needs  Protein Required: meeting needs  Fluid Required: (per MD)  Comments: LBM 9/12  Tolerance: tolerating  % Intake of Estimated Energy Needs: 75 - 100 %  % Meal Intake: NPO    Nutrition Risk    Level of Risk/Frequency of Follow-up: low(f/u 1 x wk)     Assessment and Plan      Nutrition Problem:  Moderate Protein-Calorie Malnutrition  Malnutrition in the context of Acute Illness/Injury    Related to (etiology):  Increased nutrient needs    Signs and Symptoms (as evidenced by):  Energy Intake: <75% of estimated energy requirement for >2 weeks  Body Fat Depletion: moderate depletion of orbitals and triceps   Muscle Mass Depletion: moderate and severe depletion of temples, clavicle region, interosseous muscle and lower extremities   Weight Loss: 14% x >2 weeks     Interventions (treatment strategy):  Collaboration of care with other providers  Enteral nutrition    Nutrition Diagnosis Status:  Improving            Monitor and Evaluation    Food and Nutrient Intake: energy intake, enteral nutrition intake  Food and Nutrient Adminstration:  enteral and parenteral nutrition administration  Anthropometric Measurements: weight, weight change  Biochemical Data, Medical Tests and Procedures: electrolyte and renal panel, gastrointestinal profile, glucose/endocrine profile, inflammatory profile, lipid profile  Nutrition-Focused Physical Findings: overall appearance     Malnutrition Assessment  Malnutrition Type: acute illness or injury          Weight Loss (Malnutrition): (14 % x >2 weeks)  Subcutaneous Fat (Malnutrition): moderate depletion  Muscle Mass (Malnutrition): moderate depletion   Orbital Region (Subcutaneous Fat Loss): moderate depletion  Upper Arm Region (Subcutaneous Fat Loss): moderate depletion   Hoahaoism Region (Muscle Loss): severe depletion  Clavicle Bone Region (Muscle Loss): severe depletion  Clavicle and Acromion Bone Region (Muscle Loss): severe depletion  Dorsal Hand (Muscle Loss): moderate depletion  Patellar Region (Muscle Loss): moderate depletion  Anterior Thigh Region (Muscle Loss): severe depletion  Posterior Calf Region (Muscle Loss): severe depletion   Edema (Fluid Accumulation): 0-->no edema present             Nutrition Follow-Up    RD Follow-up?: Yes

## 2020-09-14 NOTE — PLAN OF CARE
Problem: Adult Inpatient Plan of Care  Goal: Optimal Comfort and Wellbeing  Outcome: Ongoing, Progressing     Problem: Adult Inpatient Plan of Care  Goal: Readiness for Transition of Care  Outcome: Ongoing, Progressing     Problem: Adult Inpatient Plan of Care  Goal: Absence of Hospital-Acquired Illness or Injury  Outcome: Ongoing, Progressing

## 2020-09-14 NOTE — SUBJECTIVE & OBJECTIVE
Interval History: Patient with no acute events overnight. Spoke with his mother Leni who is also a patient of mine and there is a concern as one of glen's caregivers at home tested positive for COVID so will be out for next 10 days so down to one caregiver at home. I told mother to reach out to [facility] that provides the caregivers to see if temporarily they can find a replacement as we cannot keep her son and her in hospital indefinitely and if there is no safe discharge plan in next couple of days will have to look into facility placement for Glen.     Review of Systems   Unable to perform ROS: Patient nonverbal     Objective:     Vital Signs (Most Recent):  Temp: 97.2 °F (36.2 °C) (09/14/20 1116)  Pulse: 88 (09/14/20 1151)  Resp: 16 (09/14/20 1116)  BP: 114/66 (09/14/20 1116)  SpO2: 98 % (09/14/20 0759) Vital Signs (24h Range):  Temp:  [97.2 °F (36.2 °C)-98.9 °F (37.2 °C)] 97.2 °F (36.2 °C)  Pulse:  [] 88  Resp:  [16-18] 16  SpO2:  [97 %-100 %] 98 %  BP: (109-130)/(58-80) 114/66     Weight: 33.1 kg (73 lb)  Body mass index is 15.8 kg/m².    Intake/Output Summary (Last 24 hours) at 9/14/2020 1523  Last data filed at 9/14/2020 1400  Gross per 24 hour   Intake 580 ml   Output 1600 ml   Net -1020 ml      Physical Exam  Vitals signs and nursing note reviewed.   Constitutional:       General: He is not in acute distress.     Appearance: He is cachectic. He is ill-appearing (Chronic).   Eyes:      Conjunctiva/sclera: Conjunctivae normal.   Neck:      Trachea: No abnormal tracheal secretions.   Cardiovascular:      Rate and Rhythm: Normal rate and regular rhythm.      Heart sounds: Normal heart sounds. No murmur. No gallop.    Pulmonary:      Effort: Pulmonary effort is normal. No respiratory distress.      Breath sounds: Normal breath sounds. No wheezing or rales.   Abdominal:      General: Abdomen is flat. Bowel sounds are normal. There is no distension.      Palpations: Abdomen is soft.      Comments: PEG  tube in place   Neurological:      Mental Status: He is alert.      Comments: Contractures to all extremities noted         Significant Labs:   CBC:   Recent Labs   Lab 09/13/20  0537 09/14/20  0603   WBC 7.85 8.73   HGB 9.4* 8.8*   HCT 30.7* 30.0*   * 797*       Significant Imaging: I have reviewed all pertinent imaging results/findings within the past 24 hours.

## 2020-09-14 NOTE — ASSESSMENT & PLAN NOTE
Nutrition Problem:  Moderate Protein-Calorie Malnutrition  Malnutrition in the context of Acute Illness/Injury    Related to (etiology):  Increased nutrient needs    Signs and Symptoms (as evidenced by):  Energy Intake: <75% of estimated energy requirement for >2 weeks  Body Fat Depletion: moderate depletion of orbitals and triceps   Muscle Mass Depletion: moderate and severe depletion of temples, clavicle region, interosseous muscle and lower extremities   Weight Loss: 14% x >2 weeks     Interventions (treatment strategy):  Collaboration of care with other providers  Enteral nutrition    Nutrition Diagnosis Status:  Improving

## 2020-09-15 LAB
BASOPHILS # BLD AUTO: 0.02 K/UL (ref 0–0.2)
BASOPHILS NFR BLD: 0.2 % (ref 0–1.9)
DIFFERENTIAL METHOD: ABNORMAL
EOSINOPHIL # BLD AUTO: 0.5 K/UL (ref 0–0.5)
EOSINOPHIL NFR BLD: 4.8 % (ref 0–8)
ERYTHROCYTE [DISTWIDTH] IN BLOOD BY AUTOMATED COUNT: 16.3 % (ref 11.5–14.5)
FINAL PATHOLOGIC DIAGNOSIS: NORMAL
GROSS: NORMAL
HCT VFR BLD AUTO: 29.7 % (ref 40–54)
HGB BLD-MCNC: 8.9 G/DL (ref 14–18)
IMM GRANULOCYTES # BLD AUTO: 0.05 K/UL (ref 0–0.04)
IMM GRANULOCYTES NFR BLD AUTO: 0.5 % (ref 0–0.5)
LYMPHOCYTES # BLD AUTO: 1.6 K/UL (ref 1–4.8)
LYMPHOCYTES NFR BLD: 16.6 % (ref 18–48)
Lab: NORMAL
MCH RBC QN AUTO: 29.6 PG (ref 27–31)
MCHC RBC AUTO-ENTMCNC: 30 G/DL (ref 32–36)
MCV RBC AUTO: 99 FL (ref 82–98)
MONOCYTES # BLD AUTO: 1.1 K/UL (ref 0.3–1)
MONOCYTES NFR BLD: 12 % (ref 4–15)
NEUTROPHILS # BLD AUTO: 6.2 K/UL (ref 1.8–7.7)
NEUTROPHILS NFR BLD: 65.9 % (ref 38–73)
NRBC BLD-RTO: 0 /100 WBC
PLATELET # BLD AUTO: 830 K/UL (ref 150–350)
PMV BLD AUTO: 9.1 FL (ref 9.2–12.9)
RBC # BLD AUTO: 3.01 M/UL (ref 4.6–6.2)
SUPPLEMENTAL DIAGNOSIS: NORMAL
WBC # BLD AUTO: 9.42 K/UL (ref 3.9–12.7)

## 2020-09-15 PROCEDURE — 94668 MNPJ CHEST WALL SBSQ: CPT

## 2020-09-15 PROCEDURE — 99900035 HC TECH TIME PER 15 MIN (STAT)

## 2020-09-15 PROCEDURE — 94761 N-INVAS EAR/PLS OXIMETRY MLT: CPT

## 2020-09-15 PROCEDURE — 25000003 PHARM REV CODE 250: Performed by: INTERNAL MEDICINE

## 2020-09-15 PROCEDURE — 25000003 PHARM REV CODE 250: Performed by: PHYSICIAN ASSISTANT

## 2020-09-15 PROCEDURE — 63600175 PHARM REV CODE 636 W HCPCS: Performed by: HOSPITALIST

## 2020-09-15 PROCEDURE — 25000003 PHARM REV CODE 250: Performed by: HOSPITALIST

## 2020-09-15 PROCEDURE — 85025 COMPLETE CBC W/AUTO DIFF WBC: CPT

## 2020-09-15 PROCEDURE — 25000003 PHARM REV CODE 250: Performed by: STUDENT IN AN ORGANIZED HEALTH CARE EDUCATION/TRAINING PROGRAM

## 2020-09-15 PROCEDURE — 99232 PR SUBSEQUENT HOSPITAL CARE,LEVL II: ICD-10-PCS | Mod: ,,, | Performed by: INTERNAL MEDICINE

## 2020-09-15 PROCEDURE — 36415 COLL VENOUS BLD VENIPUNCTURE: CPT

## 2020-09-15 PROCEDURE — 63600175 PHARM REV CODE 636 W HCPCS: Performed by: INTERNAL MEDICINE

## 2020-09-15 PROCEDURE — 11000001 HC ACUTE MED/SURG PRIVATE ROOM

## 2020-09-15 PROCEDURE — 99232 SBSQ HOSP IP/OBS MODERATE 35: CPT | Mod: ,,, | Performed by: INTERNAL MEDICINE

## 2020-09-15 PROCEDURE — 99900026 HC AIRWAY MAINTENANCE (STAT)

## 2020-09-15 RX ORDER — GABAPENTIN 250 MG/5ML
250 SOLUTION ORAL NIGHTLY
Start: 2020-09-15

## 2020-09-15 RX ORDER — ASCORBIC ACID 500 MG
500 TABLET ORAL DAILY
COMMUNITY
Start: 2020-09-16

## 2020-09-15 RX ORDER — PHENOBARBITAL 20 MG/5ML
100 ELIXIR ORAL NIGHTLY
Status: ON HOLD
Start: 2020-09-15 | End: 2020-10-01 | Stop reason: HOSPADM

## 2020-09-15 RX ORDER — LOPERAMIDE HCL 1MG/7.5ML
2 LIQUID (ML) ORAL 4 TIMES DAILY
Refills: 0 | Status: ON HOLD
Start: 2020-09-15 | End: 2020-10-01 | Stop reason: HOSPADM

## 2020-09-15 RX ORDER — TRIPROLIDINE/PSEUDOEPHEDRINE 2.5MG-60MG
10 TABLET ORAL EVERY 6 HOURS PRN
Refills: 0 | Status: ON HOLD
Start: 2020-09-15 | End: 2020-10-01 | Stop reason: HOSPADM

## 2020-09-15 RX ORDER — PROPRANOLOL HYDROCHLORIDE 20 MG/1
20 TABLET ORAL 3 TIMES DAILY
Status: ON HOLD
Start: 2020-09-15 | End: 2020-10-01 | Stop reason: HOSPADM

## 2020-09-15 RX ORDER — BACLOFEN 10 MG/1
10 TABLET ORAL 3 TIMES DAILY
Status: ON HOLD
Start: 2020-09-15 | End: 2020-10-01 | Stop reason: HOSPADM

## 2020-09-15 RX ORDER — DIPHENHYDRAMINE HCL 12.5MG/5ML
25 ELIXIR ORAL 4 TIMES DAILY PRN
Refills: 0
Start: 2020-09-15

## 2020-09-15 RX ADMIN — LOPERAMIDE HYDROCHLORIDE 2 MG: 1 SOLUTION ORAL at 12:09

## 2020-09-15 RX ADMIN — DIPHENHYDRAMINE HYDROCHLORIDE 12.5 MG: 25 SOLUTION ORAL at 08:09

## 2020-09-15 RX ADMIN — LOPERAMIDE HYDROCHLORIDE 2 MG: 1 SOLUTION ORAL at 05:09

## 2020-09-15 RX ADMIN — Medication 1 CAPSULE: at 08:09

## 2020-09-15 RX ADMIN — BACLOFEN 10 MG: 10 TABLET ORAL at 02:09

## 2020-09-15 RX ADMIN — PROPRANOLOL HYDROCHLORIDE 20 MG: 20 TABLET ORAL at 08:09

## 2020-09-15 RX ADMIN — HEPARIN SODIUM 5000 UNITS: 5000 INJECTION INTRAVENOUS; SUBCUTANEOUS at 09:09

## 2020-09-15 RX ADMIN — Medication 10 ML: at 08:09

## 2020-09-15 RX ADMIN — PROPRANOLOL HYDROCHLORIDE 20 MG: 20 TABLET ORAL at 02:09

## 2020-09-15 RX ADMIN — Medication 500 MG: at 08:09

## 2020-09-15 RX ADMIN — HEPARIN SODIUM 5000 UNITS: 5000 INJECTION INTRAVENOUS; SUBCUTANEOUS at 05:09

## 2020-09-15 RX ADMIN — PIPERACILLIN SODIUM AND TAZOBACTAM SODIUM 4.5 G: 4; .5 INJECTION, POWDER, LYOPHILIZED, FOR SOLUTION INTRAVENOUS at 08:09

## 2020-09-15 RX ADMIN — PHENOBARBITAL 100 MG: 20 ELIXIR ORAL at 08:09

## 2020-09-15 RX ADMIN — PIPERACILLIN SODIUM AND TAZOBACTAM SODIUM 4.5 G: 4; .5 INJECTION, POWDER, LYOPHILIZED, FOR SOLUTION INTRAVENOUS at 12:09

## 2020-09-15 RX ADMIN — LOPERAMIDE HYDROCHLORIDE 2 MG: 1 SOLUTION ORAL at 08:09

## 2020-09-15 RX ADMIN — GABAPENTIN 250 MG: 250 SOLUTION ORAL at 08:09

## 2020-09-15 RX ADMIN — PIPERACILLIN SODIUM AND TAZOBACTAM SODIUM 4.5 G: 4; .5 INJECTION, POWDER, LYOPHILIZED, FOR SOLUTION INTRAVENOUS at 06:09

## 2020-09-15 RX ADMIN — HEPARIN SODIUM 5000 UNITS: 5000 INJECTION INTRAVENOUS; SUBCUTANEOUS at 02:09

## 2020-09-15 RX ADMIN — BACLOFEN 10 MG: 10 TABLET ORAL at 08:09

## 2020-09-15 NOTE — PLAN OF CARE
[facility]    HOME HEALTH ORDERS  FACE TO FACE ENCOUNTER    Patient Name: Glen Moscoso   YOB: 1995     PCP: Yadi Lawson MD   PCP Address: [address] / [place]   PCP Phone Number: [phone]   PCP Fax: [phone]     Encounter Date: 09/18/2020     Discharging Team: [facility]     Admit to Home Health    Diagnoses:  Active Hospital Problems    Diagnosis  POA    *Pneumonia due to Pseudomonas aerginuosa and Serratia marcesens [J15.1]  Yes     Priority: 1 - High    Decubitus ulcer of ischial area, left, stage IV [L89.324]  Yes     Priority: 2     Seizure disorder [G40.909]  Yes     Priority: 3     Diarrhea [R19.7]  Yes     Priority: 4     Anemia of chronic disease [D63.8]  Yes     Priority: 6     Severe protein-calorie malnutrition [E43]  Yes     Priority: 10     Cerebral palsy [G80.9]  Yes     Priority: 11     Acute blood loss anemia [D62]  No    Pressure injury of right ischium, stage 4 [L89.314]  Yes    Bacterial infection due to Serratia [A49.8]  Yes    Proctocolitis [K52.9]  Yes      Resolved Hospital Problems    Diagnosis Date Resolved POA    Biphasic stridor [R06.1] 09/10/2020 No     Priority: 1 - High    Acute respiratory failure with hypoxia [J96.01] 09/10/2020 Yes     Priority: 2     Encephalopathy acute [G93.40] 09/10/2020 Yes     Priority: 3     Decubitus ulcer of left buttock, stage 2 [L89.322] 09/10/2020 Yes     Priority: 4     Hypokalemia [E87.6] 09/10/2020 Yes     Priority: 7     Hypernatremia [E87.0] 09/10/2020 No     Priority: 8     Hypophosphatemia [E83.39] 09/10/2020 Yes     Priority: 9     Fever [R50.9] 09/10/2020 No    Status epilepticus [G40.901] 09/10/2020 Yes    Pneumonia [J18.9] 08/30/2020 Yes    Endotracheally intubated [Z97.8] 08/27/2020 Unknown    Respiratory distress [R06.03] 08/27/2020 Unknown    Deep tissue injury [T14.8XXA] 09/11/2020 Yes    Status epilepticus [G40.901] 08/30/2020 Yes        Future  Appointments   Date Time Provider Department Center   9/24/2020  2:00 PM Miguel Angel Chun MD [facility]        My clinical findings that support the need for the home health skilled services and home bound status are the following:  Weakness/numbness causing balance and gait disturbance due to Infection and Weakness/Debility making it taxing to leave home.  Requiring assistive device to leave home due to unsteady gait caused by  Infection and Weakness/Debility.    Allergies: Review of patient's allergies indicates:  No Known Allergies     Diet: tube feedings: Continuous Peptamen 1.5 with Prebio at 40 mL per hour for 24 hours per day    Activities: activity as tolerated    Nursing:   SN to complete comprehensive assessment including routine vital signs. Instruct on disease process and s/s of complications to report to MD. Review/verify medication list sent home with the patient at time of discharge  and instruct patient/caregiver as needed. Frequency may be adjusted depending on start of care date.    Notify MD if SBP > 160 or < 90; DBP > 90 or < 50; HR > 120 or < 50; Temp > 101      MISCELLANEOUS CARE:  Home Infusion Therapy:   SN to perform Infusion Therapy/Central Line Care.  Review Central Line Care & Central Line Flush with patient.    Administer (drug and dose): Piperacillin/Tazobactam (Zosyn) 13.5 grams IV continuous infusion every 24 hours   With end date of 9/24/2020 via midline.    Routine midline care.      every Monday- please draw  · CBC  · CMP  · ESR and CRP and please Fax laboratory results to [facility] at [phone] with attn: Lance Granados PA-C [facility]    WOUND CARE ORDERS  yes:  Pressure Ulcer(s) Stage IV:  Location: Bilateral ischium areas    Consult ET nurse    Bilateral ischial and scrotal skin breakdown: BID/prn clean with wound cleanser, apply sensicare skin barrier wipe to periwounds, pack ischial pressure injuries with triad barrier cream and gauze, cover with foam dressing.  Apply Triad over scrotum skin breakdown.    Medications: Review discharge medications with patient and family and provide education.      Current Discharge Medication List      START taking these medications    Details   ascorbic acid, vitamin C, (VITAMIN C) 500 MG tablet 1 tablet (500 mg total) by Per G Tube route once daily.  Qty:        loperamide (IMODIUM) 1 mg/7.5 mL solution 15 mLs (2 mg total) by Per G Tube route 4 (four) times daily. for 10 days  Refills: 0      multivitamin liquid no.118 Liqd 10 mLs by Per G Tube route once daily.  Qty:        piperacillin sodium/tazobactam (PIPERACILLIN-TAZOBACTAM 4.5G/100ML SODIUM CHLORIDE 0.9%-READY TO MIX) Inject 300 mLs (13.5 g total) into the vein continuous. End date 9/24/2020. for 9 days         CONTINUE these medications which have CHANGED    Details   baclofen (LIORESAL) 10 MG tablet 1 tablet (10 mg total) by Per G Tube route 3 (three) times daily.  Qty:        diphenhydrAMINE (BENADRYL) 12.5 mg/5 mL elixir 10 mLs (25 mg total) by Per G Tube route 4 (four) times daily as needed for Allergies.  Refills: 0      gabapentin (NEURONTIN) 250 mg/5 mL solution 5 mLs (250 mg total) by Per G Tube route nightly. At bedtime      ibuprofen (ADVIL,MOTRIN) 100 mg/5 mL suspension 10 mLs (200 mg total) by Per G Tube route every 6 (six) hours as needed for Pain or Temperature greater than.  Refills: 0      Lactobacillus rhamnosus GG (CULTURELLE) 10 billion cell capsule 1 capsule by Per G Tube route once daily.  Qty:        PHENobarbitaL 20 mg/5 mL (4 mg/mL) Elix elixir Take 25 mLs (100 mg total) by mouth every evening.      propranoloL (INDERAL) 20 MG tablet 1 tablet (20 mg total) by Per G Tube route 3 (three) times daily.    Comments: .         CONTINUE these medications which have NOT CHANGED    Details   cholestyramine (QUESTRAN) 4 gram packet Take 1 packet (4 g total) by mouth 2 (two) times daily.  Qty: 60 packet, Refills: 0      nystatin (MYCOSTATIN) cream Apply topically 2  (two) times daily.              I certify that this patient is confined to his home and needs intermittent skilled nursing care.    _________________________________  Quiana Armenta MD  09/17/2020

## 2020-09-15 NOTE — PROGRESS NOTES
[facility] Medicine  Progress Note    Patient Name: Glen Moscoso  MRN: [id number]  Patient Class: IP- Inpatient   Admission Date: 8/26/2020  Length of Stay: 20 days  Attending Physician: Nohemi Farris MD  Primary Care Provider: Yadi Lawson MD        Subjective:     Principal Problem:Pneumonia due to Pseudomonas species        HPI:  23 y/o male with cerebral palsy, seizures, and recent admission for hyponatremia who presents to [facility] for encephalopathy, sepsis, diarrhea, and possible seizure. He presented to OSH after being found unresponsive and in resp distress. He was found down by his mother upon EMS arrival he had agonal resp 3-4 per min, he was intubated by EMS and take to OSH ED. He was hypotensive on arrival started on Levo gtt, received antibiotics, ABG showed metabolic acidosis. He is being admitted to [facility] for a higher level of care, EGG and close monitoring.   Recently admitted [facility] from August 4-August 18 with hypokalemia, colitis, left buttock decubitus ulcer, fever of uncertain etiology, aspiration pneumonitis, leukocytosis, diarrhea, tachypnea, sinus tachycardia. Serum bicarbonate August 11-18 ranged 13-17. Multiple cultures to include respiratory cultures, blood cultures, and stool cultures (including C diff) did not have acute abnormalities. COVID on August 9 was negative. He was treated with p.o. vancomycin along with Levaquin and Flagyl.    Overview/Hospital Course:  Patient admitted to Neuro ICU and started on broad spectrum antibiotics (Vancomcyin and Zosyn) for his leukocytosis and fevers and elevated procalcitonin of 6. Patient was pancultured with blood cultures showing NGTD, urine cx NGTD and C diff negative. Respiratory cultures grew Serratia marcescens and Pseudomonas aeruginosa. After respiratory cultures returned Vancomycin discontinued and patient continued on IV Zosyn. Patient was extubated on 8/27 and able to be weaned to room air while in  Neuro ICU. At baseline he is nonverbal and minimally interactive. Will not follow commands. EEG was done on 8/27 which showed diffuse disorganized slowing of the background which was very nonspecific. No seizures recorded in this study. Patient restarted on home TF via PEG after extubation and free water flushes via PEG for hypernatremia noted on 8/29 of 150 with improvement. Patient was stepped down from Neuro ICU on evening of 8/29. After stepdown on evening of 8/29 patient with temperature of 102 F. Rapid response called but no new orders given. Prior to stepdown from Neuo ICU patient switched from IV Zosyn to Cipro after sensitivities returned from respiratory cultures. Patient on 8/30 had another rapid response called as patient developed stridor and labored breathing. Patient given Albuterol nebulizer treatment and nebulized racemic Epinephrine with improvement. Dr. Hitchcock and Neuro ICU team came to bedside and patient did improve after racemic Epi. Patient had ABG done that showed 7.45/31/101/98%. Stridor felt to be mechanical in nature as improved when patient sat up and when his chin was pulled up. Patien sent back to Neuro ICU for close monitoring. Patient did not require reintubation and was aggressively suctioned, placed on cough assist and CPT. Stridor felt mechanical as improved with jaw thrust and holding patient's jaw closed. Patient stepped back down to floor on 9/3. Wound care following patient and patient does have several areas of skin breakdown and most notably unstageable eschar to bilateral ischium and wound care recommending general surgery consult for evaluation and general surgery consulted on step down to floor. Patient's repeat sputum again grew Serratia and Pseudomonas so ID consulted and recommended resuming IV Zosyn to treat pneumonia and to cover wound and recommended to continue until 9/17. Bilateral ischial ulcers with eschar were sharply debrided bedside using #10 scalpel to  healthy bleeding tissue on 9/7. The right ulcer had underlying abscess cavity that tracked inferior and laterally about 2cm. The bases of both ulcers had fibrinous exudate debrided. Both sides packed with wet-to-dry gauze dressings. Patient after stepdown had issues with diarrhea and abdominal pain which was not new as patient had a recent hospitalization at [facility] in 8/2020 for treatment for colitis. GI performed flex sig on  On 9/9 and not acute abnormalities noted and random biopsies taken. Repeat stool studies done and negative for infectious etiology for diarrhea. ID reassessed patient on 9/10 and recommending to extend IV Zosyn until 9/24 to cover infected ischial orders. Diarrhea improving on 9/11. Rectal tube place to help prevent contamination of open wounds on bilateral ischium. Patient with decreased stool since started on scheduled Imodium on 9/11. PICC line team consulted and midline placed for long term antibiotics for home on 9/14. Home Health orders written and likely home discharge with  nursing and home infusion for antibiotics by end of this week. Diarrhea is greatly improved with scheduled Imodium. Final pathology from colon biopsies showed colonic mucosa with mild acute colitis, glandular atrophy/dropout and patchy apoptosis and considerations include infection and drug/toxin. No features chronic inflammatory bowel disease. CMV negative.  Infectious work-up was negative so doubt infectious cause.           Interval History: Diarrhea better controlled. Rectal tube in place. Patient with no acute events overnight. Midline placed by PICC team yesterday for long term abx as needs IV Zosyn until 9/24 to treat infected ischial ulcers. Home Health orders done so that case management and social work can start to work on  for wound care and home IV infusion. Patient is close to being ready for hospital discharge but awaiting medically stability of patient's mother for discharge who is also a  patient in the hospital. Hopefully should discharge by the end of this week to home.     Review of Systems   Unable to perform ROS: Patient nonverbal     Objective:     Vital Signs (Most Recent):  Temp: 99.2 °F (37.3 °C) (09/15/20 0746)  Pulse: 97 (09/15/20 1401)  Resp: 18 (09/15/20 1401)  BP: 119/72 (09/15/20 0746)  SpO2: 97 % (09/15/20 1401) Vital Signs (24h Range):  Temp:  [98.4 °F (36.9 °C)-99.8 °F (37.7 °C)] 99.2 °F (37.3 °C)  Pulse:  [] 97  Resp:  [16-20] 18  SpO2:  [93 %-97 %] 97 %  BP: (110-162)/(67-78) 119/72     Weight: 33.1 kg (73 lb)  Body mass index is 15.8 kg/m².    Intake/Output Summary (Last 24 hours) at 9/15/2020 1604  Last data filed at 9/15/2020 0600  Gross per 24 hour   Intake --   Output 800 ml   Net -800 ml      Physical Exam  Vitals signs and nursing note reviewed.   Constitutional:       General: He is not in acute distress.     Appearance: He is cachectic. He is ill-appearing (Chronic).   Eyes:      Conjunctiva/sclera: Conjunctivae normal.   Neck:      Trachea: No abnormal tracheal secretions.   Cardiovascular:      Rate and Rhythm: Normal rate and regular rhythm.      Heart sounds: Normal heart sounds. No murmur. No gallop.    Pulmonary:      Effort: Pulmonary effort is normal. No respiratory distress.      Breath sounds: Normal breath sounds. No wheezing or rales.   Abdominal:      General: Abdomen is flat. Bowel sounds are normal. There is no distension.      Palpations: Abdomen is soft.      Comments: PEG tube in place   Neurological:      Mental Status: He is alert.      Comments: Contractures to all extremities noted         Significant Labs:   CBC:   Recent Labs   Lab 09/14/20  0603 09/15/20  0407   WBC 8.73 9.42   HGB 8.8* 8.9*   HCT 30.0* 29.7*   * 830*       Significant Imaging: I have reviewed all pertinent imaging results/findings within the past 24 hours.      Assessment/Plan:      * Pneumonia due to Pseudomonas aerginuosa and Serratia marcesens  · Resolved. patient  completed abx course for pneumonia with IV Zosyn. Patient not requiring oxygen and respiratory status is stable.   · Patient admitted with acute hypoxic respiratory failure and sepsis. Blood and urine cultures negative and C. Diff negative but respiratory culture grew Serratia and Pseudomonas. Patient treated with IV Zosyn and switched to IV Cipro and treatment stopped on 9/3 as presumably had completed treatment course for pneumonia but continued to spike fever and repeat sputum cultures on  again grew Serratia so ID consulted on 9/3 and recommending resuming IV Zosyn to treat. ID recommending to extend IV Zosyn until  to cover both wounds and pneumonia.   · Patient intubated on admit to Neuro ICU and required mechanical ventilation intimally but able to be extubated and weaned to room air and remains on room air. Patient now on room air and breathing on his own.       Decubitus ulcer of ischial area, left, stage IV  Pressure injury of right ischium, stage 4  · Noted on admit to have bilateral ischial ulcers with eschar. Wound  consulted and recommended frequent turning and low air loss mattress. Wound care re-evaluated on  and recommended general surgery to evaluate as concerned wounds appeared to be worsening.  · General surgery evaluated and at first did not feel intervention needed but ulcers continued ot worse so sharp debridement of ulcers done on  at bedside and abscess cavity and tunneling noted to right ischial ulcers that was drained by General surgery. Patient was debrided to healthy tissue by surgery.  · CT scan of pelvis done and no bone involvement noted with ulcers to suggest underlying osteomyelitis.   · Continue wound care as per surgery recs with packing with wet to dry gauze dressings BID and prn.   · No cultures were taken of abscess site by surgery at time of debridement so no causative organism identiifed.   · ID recommmended to continue IV Zosyn 4.5 grams IV every 8 hours to  treat until 9/24/2020. Plan to switch to continuous IV Zosyn infusion on hospital discharge for ease of use for family. Midline placed by midline team to left arm on 9/14.   · Turn patient every 2 hours and low air loss mattress. Rectal tube to help prevent stool from getting into wounds.    Seizure disorder  Chronic and controlled. EEG done on this admit showed no status epilepticus. Continue Phenobarbital 100 mg nightly via G tube to treat as patient takes at home       Diarrhea  Proctocolitis  · Improved with scheduled Imodium and will continue at present as seems to be helping his diarrhea. Colon biopsies still pending.   · C dff negative on admit.  · Diarrhea worsened in hospital. Patient with similar episode in 8/2020 so GI consulted for evaluation of diarrhea. Repeat CT scan on this admit on 9/4 showed persistent proctocolitis and inflammatory changes in colon area as seen on previous imaging.   · GI consulted and performed flex sig on 9/9 to evaluate and showed no gross abnormalities, biopsies taken. Repeat stool studies on this admit negative for infectious etiology. Pathology from colon biopsies showed mild coxitis but no signs of inflammatory bowel disease and negative for CMV. Appearance of biopsy consistent with drug/toxin or infectious etiology. Patient had an infectious work-up and was negative. No obvious drug or toxin cause. Improved with Imodium.   · As per GI, give trial of scheduled Imodium to see if helps so will try Imodium 2 mg po 4 times daily. If that fails then could consider trial of cholestyramine.     Anemia of chronic disease  Acute blood loss anemia  · Chronic and controlled. Monitor with daily CBC and transfuse if Hgb < 7.   · Hgb dropped to 6.7 on 9/8 and patient transfused 1 unit of PRBCs. FOBT done and heme positive. GI consulted and flex sig done on 9/9 and unremarkable.  · Hgb stable after blood transfusion on 8/8 and stable at 9-10.  · Monitor with daily CBC and transfuse if Hgb  < 7.       Severe protein-calorie malnutrition  · Patient with obvious muscle wasting and BMI of 15.8. Nutrition consulted on admit and following patient in hospital and providing recommendations.   · Patient started on TF with Peptamen 1.5 with Prebio at 40 cc/hr and continued in hospital and nutrition following.  · Nutrition recommended adding Zinc, MVI and Vitamin C to help with wound healing. MVI should contain enough Zinc for wound healing and added Vit C 500 mg via PEG with MVI once daily via PEG on 9/15. Zinc only comes in capsules so cannot give to patient as has a PEG tube and strict NPO.       Cerebral palsy  Patient with poor functional baseline and bed bound with extremity contractures and total care at home. Patient at baseline function in hospital.         VTE Risk Mitigation (From admission, onward)         Ordered     heparin (porcine) injection 5,000 Units  Every 8 hours      09/09/20 1644     Place sequential compression device  Until discontinued      08/26/20 0210     IP VTE LOW RISK PATIENT  Once      08/26/20 0210                Discharge Planning   ROCÍO: 9/17/2020     Code Status: Full Code   Is the patient medically ready for discharge?: No    Reason for patient still in hospital (select all that apply): Patient trending condition  Discharge Plan A: Home Health, Home with family   Discharge Delays: None known at this time        Nohemi Farris MD  Department of Hospital Medicine   [facility]

## 2020-09-15 NOTE — ASSESSMENT & PLAN NOTE
· Resolved. patient completed abx course for pneumonia with IV Zosyn. Patient not requiring oxygen and respiratory status is stable.   · Patient admitted with acute hypoxic respiratory failure and sepsis. Blood and urine cultures negative and C. Diff negative but respiratory culture grew Serratia and Pseudomonas. Patient treated with IV Zosyn and switched to IV Cipro and treatment stopped on 9/3 as presumably had completed treatment course for pneumonia but continued to spike fever and repeat sputum cultures on 9/6 again grew Serratia so ID consulted on 9/3 and recommending resuming IV Zosyn to treat. ID recommending to extend IV Zosyn until 9/24 to cover both wounds and pneumonia.   · Patient intubated on admit to Neuro ICU and required mechanical ventilation intimally but able to be extubated and weaned to room air and remains on room air. Patient now on room air and breathing on his own.

## 2020-09-15 NOTE — ASSESSMENT & PLAN NOTE
· Patient with obvious muscle wasting and BMI of 15.8. Nutrition consulted on admit and following patient in hospital and providing recommendations.   · Patient started on TF with Peptamen 1.5 with Prebio at 40 cc/hr and continued in hospital and nutrition following.  · Nutrition recommended adding Zinc, MVI and Vitamin C to help with wound healing. MVI should contain enough Zinc for wound healing and added Vit C 500 mg via PEG with MVI once daily via PEG on 9/15. Zinc only comes in capsules so cannot give to patient as has a PEG tube and strict NPO.

## 2020-09-15 NOTE — ASSESSMENT & PLAN NOTE
Proctocolitis  · Improved with scheduled Imodium and will continue at present as seems to be helping his diarrhea. Colon biopsies still pending.   · C dff negative on admit.  · Diarrhea worsened in hospital. Patient with similar episode in 8/2020 so GI consulted for evaluation of diarrhea. Repeat CT scan on this admit on 9/4 showed persistent proctocolitis and inflammatory changes in colon area as seen on previous imaging.   · GI consulted and performed flex sig on 9/9 to evaluate and showed no gross abnormalities, biopsies taken. Repeat stool studies on this admit negative for infectious etiology. Pathology from colon biopsies showed mild coxitis but no signs of inflammatory bowel disease and negative for CMV. Appearance of biopsy consistent with drug/toxin or infectious etiology. Patient had an infectious work-up and was negative. No obvious drug or toxin cause. Improved with Imodium.   · As per GI, give trial of scheduled Imodium to see if helps so will try Imodium 2 mg po 4 times daily. If that fails then could consider trial of cholestyramine.

## 2020-09-15 NOTE — PHYSICIAN QUERY
"PT Name: Glen Moscoso  MR #: [id number]    Procedure Clarification     CDS/: Ashish Bhatia RN             Contact information: [email or web address]  This form is a permanent document in the medical record.    Query Date: September 15, 2020  By submitting this query, we are merely seeking further clarification of documentation. Please utilize your independent clinical judgment when addressing the question(s) below.    The Medical Record contains the following:   Indicator Supporting Clinical Findings Location in Medical Record    Documentation of "Debridement" Pressure injury of right ischium, stage 4  · Noted on admit to have bilateral ischial ulcers with eschar. Wound  consulted and recommended frequent turning and low air loss mattress. Wound care re-evaluated on  and recommended general surgery to evaluate as concerned wounds appeared to be worsening.    · CT scan of pelvis done and no bone involvement noted with ulcers to suggest underlying osteomyelitis.    Assessment and Plan 9/10 ([place])    Documentation of "I&D" General Surgery Note:      Bilateral ischial pressure ulcers were re-assessed today. Upon evaluation, patient was sitting in his own stool. Still having frequent loose stools.      Bilateral ulcers stage IV with overlying eschar. They were sharply debrided bedside using #10 scalpel to healthy bleeding tissue. The right ulcer had underlying abscess cavity that tracked inferior and laterally about 2cm. The bases of both ulcers had fibrinous exudate debrided. Both sides packed with wet-to-dry gauze dressings.      Call general surgery for any further needs. Continue local wound care. Recommend BID dressing changes.   Progress Note General Surgery Note  (brandon)    Other       Excisional debridement is a surgical removal of nonvitalized tissue, necrosis or slough. The use of a sharp instrument does not always indicate that an excisional debridement was performed.    Nonexcisional " debridement is the scraping, washing, irrigating, brushing away or removal of loose tissue fragments.    Provider, please provide further clarification on the procedure performed on _______Bilateral ischial pressure ulcers              :    [   ] Excisional Debridement of skin   [ x  ] Excisional Debridement of subcutaneous tissue/fascia   [   ] Excisional Debridement of muscle   [   ] Excisional Debridement of tendon   [   ] Excisional Debridement of bone        [   ] Nonexcisional Debridement of skin   [   ] Nonexcisional Debridement of subcutaneous tissue/fascia   [   ] Nonexcisional Debridement of muscle   [   ] Nonexcisional Debridement of tendon   [   ] Nonexcisional Debridement of bone     [   ] Other Procedure (please specify): _____________   [  ] Clinically Undetermined

## 2020-09-15 NOTE — SUBJECTIVE & OBJECTIVE
Interval History: Diarrhea better controlled. Rectal tube in place. Patient with no acute events overnight. Midline placed by PICC team yesterday for long term abx as needs IV Zosyn until 9/24 to treat infected ischial ulcers. Home Health orders done so that case management and social work can start to work on HH for wound care and home IV infusion. Patient is close to being ready for hospital discharge but awaiting medically stability of patient's mother for discharge who is also a patient in the hospital. Hopefully should discharge by the end of this week to home.     Review of Systems   Unable to perform ROS: Patient nonverbal     Objective:     Vital Signs (Most Recent):  Temp: 99.2 °F (37.3 °C) (09/15/20 0746)  Pulse: 97 (09/15/20 1401)  Resp: 18 (09/15/20 1401)  BP: 119/72 (09/15/20 0746)  SpO2: 97 % (09/15/20 1401) Vital Signs (24h Range):  Temp:  [98.4 °F (36.9 °C)-99.8 °F (37.7 °C)] 99.2 °F (37.3 °C)  Pulse:  [] 97  Resp:  [16-20] 18  SpO2:  [93 %-97 %] 97 %  BP: (110-162)/(67-78) 119/72     Weight: 33.1 kg (73 lb)  Body mass index is 15.8 kg/m².    Intake/Output Summary (Last 24 hours) at 9/15/2020 1604  Last data filed at 9/15/2020 0600  Gross per 24 hour   Intake --   Output 800 ml   Net -800 ml      Physical Exam  Vitals signs and nursing note reviewed.   Constitutional:       General: He is not in acute distress.     Appearance: He is cachectic. He is ill-appearing (Chronic).   Eyes:      Conjunctiva/sclera: Conjunctivae normal.   Neck:      Trachea: No abnormal tracheal secretions.   Cardiovascular:      Rate and Rhythm: Normal rate and regular rhythm.      Heart sounds: Normal heart sounds. No murmur. No gallop.    Pulmonary:      Effort: Pulmonary effort is normal. No respiratory distress.      Breath sounds: Normal breath sounds. No wheezing or rales.   Abdominal:      General: Abdomen is flat. Bowel sounds are normal. There is no distension.      Palpations: Abdomen is soft.      Comments:  PEG tube in place   Neurological:      Mental Status: He is alert.      Comments: Contractures to all extremities noted         Significant Labs:   CBC:   Recent Labs   Lab 09/14/20  0603 09/15/20  0407   WBC 8.73 9.42   HGB 8.8* 8.9*   HCT 30.0* 29.7*   * 830*       Significant Imaging: I have reviewed all pertinent imaging results/findings within the past 24 hours.

## 2020-09-15 NOTE — ASSESSMENT & PLAN NOTE
Pressure injury of right ischium, stage 4  · Noted on admit to have bilateral ischial ulcers with eschar. Wound  consulted and recommended frequent turning and low air loss mattress. Wound care re-evaluated on  and recommended general surgery to evaluate as concerned wounds appeared to be worsening.  · General surgery evaluated and at first did not feel intervention needed but ulcers continued ot worse so sharp debridement of ulcers done on  at bedside and abscess cavity and tunneling noted to right ischial ulcers that was drained by General surgery. Patient was debrided to healthy tissue by surgery.  · CT scan of pelvis done and no bone involvement noted with ulcers to suggest underlying osteomyelitis.   · Continue wound care as per surgery recs with packing with wet to dry gauze dressings BID and prn.   · No cultures were taken of abscess site by surgery at time of debridement so no causative organism identiifed.   · ID recommmended to continue IV Zosyn 4.5 grams IV every 8 hours to treat until 2020. Plan to switch to continuous IV Zosyn infusion on hospital discharge for ease of use for family. Midline placed by midline team to left arm on .   · Turn patient every 2 hours and low air loss mattress. Rectal tube to help prevent stool from getting into wounds.

## 2020-09-15 NOTE — PLAN OF CARE
Problem: Adult Inpatient Plan of Care  Goal: Plan of Care Review  Outcome: Ongoing, Progressing     Problem: Skin Injury Risk Increased  Goal: Skin Health and Integrity  Outcome: Ongoing, Progressing     Problem: Fall Injury Risk  Goal: Absence of Fall and Fall-Related Injury  Outcome: Ongoing, Progressing     Problem: Fluid Imbalance (Pneumonia)  Goal: Fluid Balance  Outcome: Ongoing, Progressing     Problem: Respiratory Compromise (Pneumonia)  Goal: Effective Oxygenation and Ventilation  Outcome: Ongoing, Progressing     Problem: Wound  Goal: Optimal Wound Healing  Outcome: Ongoing, Progressing     POC reviewed with patient and caregiver . All questions and concerns reviewed. Pt nonverbal caregiver verbalized understanding. VSS throughout shift. Fall/safety precautions implemented and maintained. Bed locked in lowest position. Call bell within reach. Will continue to monitor.

## 2020-09-16 LAB
ANION GAP SERPL CALC-SCNC: 12 MMOL/L (ref 8–16)
BACTERIA #/AREA URNS AUTO: ABNORMAL /HPF
BASOPHILS # BLD AUTO: 0.04 K/UL (ref 0–0.2)
BASOPHILS NFR BLD: 0.3 % (ref 0–1.9)
BILIRUB UR QL STRIP: NEGATIVE
BUN SERPL-MCNC: 8 MG/DL (ref 6–20)
CALCIUM SERPL-MCNC: 9.4 MG/DL (ref 8.7–10.5)
CHLORIDE SERPL-SCNC: 102 MMOL/L (ref 95–110)
CLARITY UR REFRACT.AUTO: ABNORMAL
CO2 SERPL-SCNC: 24 MMOL/L (ref 23–29)
COLOR UR AUTO: YELLOW
CREAT SERPL-MCNC: 0.6 MG/DL (ref 0.5–1.4)
DIFFERENTIAL METHOD: ABNORMAL
EOSINOPHIL # BLD AUTO: 0 K/UL (ref 0–0.5)
EOSINOPHIL NFR BLD: 0.2 % (ref 0–8)
ERYTHROCYTE [DISTWIDTH] IN BLOOD BY AUTOMATED COUNT: 16.2 % (ref 11.5–14.5)
EST. GFR  (AFRICAN AMERICAN): >60 ML/MIN/1.73 M^2
EST. GFR  (NON AFRICAN AMERICAN): >60 ML/MIN/1.73 M^2
GLUCOSE SERPL-MCNC: 94 MG/DL (ref 70–110)
GLUCOSE UR QL STRIP: NEGATIVE
HCT VFR BLD AUTO: 34.2 % (ref 40–54)
HGB BLD-MCNC: 10.5 G/DL (ref 14–18)
HGB UR QL STRIP: NEGATIVE
HYALINE CASTS UR QL AUTO: 3 /LPF
IMM GRANULOCYTES # BLD AUTO: 0.03 K/UL (ref 0–0.04)
IMM GRANULOCYTES NFR BLD AUTO: 0.3 % (ref 0–0.5)
KETONES UR QL STRIP: NEGATIVE
LEUKOCYTE ESTERASE UR QL STRIP: NEGATIVE
LYMPHOCYTES # BLD AUTO: 2.4 K/UL (ref 1–4.8)
LYMPHOCYTES NFR BLD: 20.2 % (ref 18–48)
MCH RBC QN AUTO: 30.2 PG (ref 27–31)
MCHC RBC AUTO-ENTMCNC: 30.7 G/DL (ref 32–36)
MCV RBC AUTO: 98 FL (ref 82–98)
MICROSCOPIC COMMENT: ABNORMAL
MONOCYTES # BLD AUTO: 1.4 K/UL (ref 0.3–1)
MONOCYTES NFR BLD: 11.7 % (ref 4–15)
NEUTROPHILS # BLD AUTO: 8 K/UL (ref 1.8–7.7)
NEUTROPHILS NFR BLD: 67.3 % (ref 38–73)
NITRITE UR QL STRIP: NEGATIVE
NRBC BLD-RTO: 0 /100 WBC
PH UR STRIP: 6 [PH] (ref 5–8)
PLATELET # BLD AUTO: 911 K/UL (ref 150–350)
PMV BLD AUTO: 9.4 FL (ref 9.2–12.9)
POTASSIUM SERPL-SCNC: 4.3 MMOL/L (ref 3.5–5.1)
PROT UR QL STRIP: NEGATIVE
RBC # BLD AUTO: 3.48 M/UL (ref 4.6–6.2)
RBC #/AREA URNS AUTO: 1 /HPF (ref 0–4)
SODIUM SERPL-SCNC: 138 MMOL/L (ref 136–145)
SP GR UR STRIP: 1.01 (ref 1–1.03)
SQUAMOUS #/AREA URNS AUTO: 0 /HPF
URN SPEC COLLECT METH UR: ABNORMAL
WBC # BLD AUTO: 11.91 K/UL (ref 3.9–12.7)
WBC #/AREA URNS AUTO: 2 /HPF (ref 0–5)

## 2020-09-16 PROCEDURE — 25000003 PHARM REV CODE 250: Performed by: HOSPITALIST

## 2020-09-16 PROCEDURE — 25000003 PHARM REV CODE 250: Performed by: INTERNAL MEDICINE

## 2020-09-16 PROCEDURE — 94640 AIRWAY INHALATION TREATMENT: CPT

## 2020-09-16 PROCEDURE — 25000242 PHARM REV CODE 250 ALT 637 W/ HCPCS: Performed by: HOSPITALIST

## 2020-09-16 PROCEDURE — 85025 COMPLETE CBC W/AUTO DIFF WBC: CPT

## 2020-09-16 PROCEDURE — 80048 BASIC METABOLIC PNL TOTAL CA: CPT

## 2020-09-16 PROCEDURE — 99900026 HC AIRWAY MAINTENANCE (STAT)

## 2020-09-16 PROCEDURE — 99232 SBSQ HOSP IP/OBS MODERATE 35: CPT | Mod: ,,, | Performed by: INTERNAL MEDICINE

## 2020-09-16 PROCEDURE — 25000003 PHARM REV CODE 250: Performed by: STUDENT IN AN ORGANIZED HEALTH CARE EDUCATION/TRAINING PROGRAM

## 2020-09-16 PROCEDURE — 99232 PR SUBSEQUENT HOSPITAL CARE,LEVL II: ICD-10-PCS | Mod: ,,, | Performed by: INTERNAL MEDICINE

## 2020-09-16 PROCEDURE — 87040 BLOOD CULTURE FOR BACTERIA: CPT

## 2020-09-16 PROCEDURE — 94668 MNPJ CHEST WALL SBSQ: CPT

## 2020-09-16 PROCEDURE — 63600175 PHARM REV CODE 636 W HCPCS: Performed by: INTERNAL MEDICINE

## 2020-09-16 PROCEDURE — 99900035 HC TECH TIME PER 15 MIN (STAT)

## 2020-09-16 PROCEDURE — 11000001 HC ACUTE MED/SURG PRIVATE ROOM

## 2020-09-16 PROCEDURE — 63600175 PHARM REV CODE 636 W HCPCS: Performed by: HOSPITALIST

## 2020-09-16 PROCEDURE — 81001 URINALYSIS AUTO W/SCOPE: CPT

## 2020-09-16 PROCEDURE — 94761 N-INVAS EAR/PLS OXIMETRY MLT: CPT

## 2020-09-16 PROCEDURE — 36415 COLL VENOUS BLD VENIPUNCTURE: CPT

## 2020-09-16 PROCEDURE — 25000003 PHARM REV CODE 250: Performed by: PHYSICIAN ASSISTANT

## 2020-09-16 RX ADMIN — PIPERACILLIN SODIUM AND TAZOBACTAM SODIUM 4.5 G: 4; .5 INJECTION, POWDER, LYOPHILIZED, FOR SOLUTION INTRAVENOUS at 12:09

## 2020-09-16 RX ADMIN — BACLOFEN 10 MG: 10 TABLET ORAL at 09:09

## 2020-09-16 RX ADMIN — PIPERACILLIN SODIUM AND TAZOBACTAM SODIUM 4.5 G: 4; .5 INJECTION, POWDER, LYOPHILIZED, FOR SOLUTION INTRAVENOUS at 08:09

## 2020-09-16 RX ADMIN — ALBUTEROL SULFATE 2.5 MG: 2.5 SOLUTION RESPIRATORY (INHALATION) at 11:09

## 2020-09-16 RX ADMIN — LOPERAMIDE HYDROCHLORIDE 2 MG: 1 SOLUTION ORAL at 05:09

## 2020-09-16 RX ADMIN — HEPARIN SODIUM 5000 UNITS: 5000 INJECTION INTRAVENOUS; SUBCUTANEOUS at 10:09

## 2020-09-16 RX ADMIN — Medication 10 ML: at 09:09

## 2020-09-16 RX ADMIN — PHENOBARBITAL 100 MG: 20 ELIXIR ORAL at 09:09

## 2020-09-16 RX ADMIN — HEPARIN SODIUM 5000 UNITS: 5000 INJECTION INTRAVENOUS; SUBCUTANEOUS at 05:09

## 2020-09-16 RX ADMIN — PIPERACILLIN SODIUM AND TAZOBACTAM SODIUM 4.5 G: 4; .5 INJECTION, POWDER, LYOPHILIZED, FOR SOLUTION INTRAVENOUS at 05:09

## 2020-09-16 RX ADMIN — LOPERAMIDE HYDROCHLORIDE 2 MG: 1 SOLUTION ORAL at 09:09

## 2020-09-16 RX ADMIN — HEPARIN SODIUM 5000 UNITS: 5000 INJECTION INTRAVENOUS; SUBCUTANEOUS at 02:09

## 2020-09-16 RX ADMIN — BACLOFEN 10 MG: 10 TABLET ORAL at 02:09

## 2020-09-16 RX ADMIN — PROPRANOLOL HYDROCHLORIDE 20 MG: 20 TABLET ORAL at 09:09

## 2020-09-16 RX ADMIN — PROPRANOLOL HYDROCHLORIDE 20 MG: 20 TABLET ORAL at 02:09

## 2020-09-16 RX ADMIN — ACETAMINOPHEN 650 MG: 325 TABLET ORAL at 12:09

## 2020-09-16 RX ADMIN — DIPHENHYDRAMINE HYDROCHLORIDE 12.5 MG: 25 SOLUTION ORAL at 09:09

## 2020-09-16 RX ADMIN — Medication 1 CAPSULE: at 09:09

## 2020-09-16 RX ADMIN — Medication 500 MG: at 09:09

## 2020-09-16 RX ADMIN — LOPERAMIDE HYDROCHLORIDE 2 MG: 1 SOLUTION ORAL at 01:09

## 2020-09-16 RX ADMIN — GABAPENTIN 250 MG: 250 SOLUTION ORAL at 09:09

## 2020-09-16 NOTE — PLAN OF CARE
09/16/20 1025   Post-Acute Status   Post-Acute Authorization Home Health   Home Health Status Referrals Sent       Pt has been accepted by [facility]. But [facility] is not in network. Referral sent to [facility].  Awaiting acceptance.  SW in contact with CM and Medical staff. Will continue to follow and offer support as needed.     Oziel Gutierrez, LU  [facility]   Ext. [phone]

## 2020-09-16 NOTE — PLAN OF CARE
Problem: Adult Inpatient Plan of Care  Goal: Plan of Care Review  Outcome: Ongoing, Progressing     Problem: Fall Injury Risk  Goal: Absence of Fall and Fall-Related Injury  Outcome: Ongoing, Progressing     Problem: Skin Injury Risk Increased  Goal: Skin Health and Integrity  Outcome: Ongoing, Progressing     Problem: Infection  Goal: Infection Symptom Resolution  Outcome: Ongoing, Progressing     Problem: Wound  Goal: Optimal Wound Healing  Outcome: Ongoing, Progressing       POC reviewed with patient and caregiver. VSS throughout shift. Fall/safety precautions implemented and maintained.  Bed locked in lowest position. Call bell within reach. Will continue to monitor.

## 2020-09-16 NOTE — ASSESSMENT & PLAN NOTE
· Patient with fever early this am after midnight on 9/16 to 101 F. Patient's WBC 11,900 but platelet count increased so concerning for developing infection.  · Will check U/A as michelle has a Iglesias and blood culture.  · Lungs are clear on exam and patient not hypoxic and breathing is normal and lungs clear on exam so will hold off on CXR at this time.  · Diarrhea improving and he had extensive infectious work-up that was unremarkable. Midline placed on 9/14 so not source.  · Will continue IV Zosyn for now and not broaden antibiotics. Main concern for source would be his ischial ulcers and may need to consider repeat imaging of pelvis area if fevers persist.

## 2020-09-16 NOTE — SUBJECTIVE & OBJECTIVE
Interval History: Patient with fever to 101 F around midnight earlier in the morning. Patient given Tylenol with relief. Patient remains on room air and lungs remain clear on exam so doubt pulmonary cause of fever. Patient does have a Iglesias so will get U/A to evaluate and check blood culture. Patient's midline was just placed on 9/14 so not a source. Patient does have bilateral ischial ulcers so they could be potential source. For now do not plan on broadening antibiotics. Patient remains on IV Zosyn for treatment of infected ischial ulcers. I updated patient's mother, Leni who is also a patient of mine about new developments so any discharge plans for Glen on hold at this time until fever work-up complete. Patient may need another repeat CT scan of pelvic area to reassess infection in that area if rest of infection work-up is unremarkable. SW/ANGELO continuing on working on infusion [facility] to supply home IV abx and HH orders done. Patient with new fever development so explained to his mother that he is not medically ready at this time. She likely will be ready for hospital discharge tomorrow as she is improving and doing better with therapy and getting around better and was asking about possibly going home tomorrow.     Review of Systems   Unable to perform ROS: Patient nonverbal     Objective:     Vital Signs (Most Recent):  Temp: 98.3 °F (36.8 °C) (09/16/20 1220)  Pulse: 101 (09/16/20 1220)  Resp: 18 (09/16/20 1220)  BP: 116/68 (09/16/20 1220)  SpO2: 95 % (09/16/20 1220) Vital Signs (24h Range):  Temp:  [98.3 °F (36.8 °C)-101 °F (38.3 °C)] 98.3 °F (36.8 °C)  Pulse:  [] 101  Resp:  [18-20] 18  SpO2:  [92 %-98 %] 95 %  BP: (116-133)/(65-82) 116/68     Weight: 33.1 kg (73 lb)  Body mass index is 15.8 kg/m².    Intake/Output Summary (Last 24 hours) at 9/16/2020 1420  Last data filed at 9/15/2020 1700  Gross per 24 hour   Intake --   Output 750 ml   Net -750 ml      Physical Exam  Vitals signs and nursing note  reviewed.   Constitutional:       General: He is not in acute distress.     Appearance: He is cachectic. He is ill-appearing (Chronic).   Eyes:      Conjunctiva/sclera: Conjunctivae normal.   Neck:      Trachea: No abnormal tracheal secretions.   Cardiovascular:      Rate and Rhythm: Normal rate and regular rhythm.      Heart sounds: Normal heart sounds. No murmur. No gallop.    Pulmonary:      Effort: Pulmonary effort is normal. No respiratory distress.      Breath sounds: Normal breath sounds. No wheezing or rales.   Abdominal:      General: Abdomen is flat. Bowel sounds are normal. There is no distension.      Palpations: Abdomen is soft.      Comments: PEG tube in place   Genitourinary:     Comments: Iglesias in place  Neurological:      Mental Status: He is alert.      Comments: Contractures to all extremities noted         Significant Labs:   CBC:   Recent Labs   Lab 09/15/20  0407 09/16/20  0606   WBC 9.42 11.91   HGB 8.9* 10.5*   HCT 29.7* 34.2*   * 911*     CMP:   Recent Labs   Lab 09/16/20  0606      K 4.3      CO2 24   GLU 94   BUN 8   CREATININE 0.6   CALCIUM 9.4   ANIONGAP 12   EGFRNONAA >60.0       Significant Imaging: I have reviewed all pertinent imaging results/findings within the past 24 hours.

## 2020-09-16 NOTE — ASSESSMENT & PLAN NOTE
Pressure injury of right ischium, stage 4  · Noted on admit to have bilateral ischial ulcers with eschar. Wound  consulted and recommended frequent turning and low air loss mattress. Wound care re-evaluated on  and recommended general surgery to evaluate as concerned wounds appeared to be worsening.  · General surgery evaluated and at first did not feel intervention needed but ulcers continued ot worse so sharp debridement of ulcers done on  at bedside and abscess cavity and tunneling noted to right ischial ulcers that was drained by General surgery. Patient was debrided to healthy tissue by surgery.  · CT scan of pelvis done and no bone involvement noted with ulcers to suggest underlying osteomyelitis.   · Continue wound care as per surgery recs with packing with wet to dry gauze dressings BID and prn.   · No cultures were taken of abscess site by surgery at time of debridement so no causative organism identiifed.   · ID recommmended to continue IV Zosyn 4.5 grams IV every 8 hours to treat until 2020. Plan to switch to continuous IV Zosyn infusion on hospital discharge for ease of use for family. Midline placed by midline team to left arm on . CM/SW working on HH and home IV infusion when medically ready.   · Turn patient every 2 hours and low air loss mattress. Rectal tube to help prevent stool from getting into wounds.

## 2020-09-16 NOTE — PROGRESS NOTES
[facility] Medicine  Progress Note    Patient Name: Glen Moscoso  MRN: [id number]  Patient Class: IP- Inpatient   Admission Date: 8/26/2020  Length of Stay: 21 days  Attending Physician: Nohemi Farris MD  Primary Care Provider: Yadi Lawson MD        Subjective:     Principal Problem:Pneumonia due to Pseudomonas species        HPI:  25 y/o male with cerebral palsy, seizures, and recent admission for hyponatremia who presents to [facility] for encephalopathy, sepsis, diarrhea, and possible seizure. He presented to OSH after being found unresponsive and in resp distress. He was found down by his mother upon EMS arrival he had agonal resp 3-4 per min, he was intubated by EMS and take to OSH ED. He was hypotensive on arrival started on Levo gtt, received antibiotics, ABG showed metabolic acidosis. He is being admitted to [facility] for a higher level of care, EGG and close monitoring.   Recently admitted [facility] from August 4-August 18 with hypokalemia, colitis, left buttock decubitus ulcer, fever of uncertain etiology, aspiration pneumonitis, leukocytosis, diarrhea, tachypnea, sinus tachycardia. Serum bicarbonate August 11-18 ranged 13-17. Multiple cultures to include respiratory cultures, blood cultures, and stool cultures (including C diff) did not have acute abnormalities. COVID on August 9 was negative. He was treated with p.o. vancomycin along with Levaquin and Flagyl.    Overview/Hospital Course:  Patient admitted to Neuro ICU and started on broad spectrum antibiotics (Vancomcyin and Zosyn) for his leukocytosis and fevers and elevated procalcitonin of 6. Patient was pancultured with blood cultures showing NGTD, urine cx NGTD and C diff negative. Respiratory cultures grew Serratia marcescens and Pseudomonas aeruginosa. After respiratory cultures returned Vancomycin discontinued and patient continued on IV Zosyn. Patient was extubated on 8/27 and able to be weaned to room air while in  Neuro ICU. At baseline he is nonverbal and minimally interactive. Will not follow commands. EEG was done on 8/27 which showed diffuse disorganized slowing of the background which was very nonspecific. No seizures recorded in this study. Patient restarted on home TF via PEG after extubation and free water flushes via PEG for hypernatremia noted on 8/29 of 150 with improvement. Patient was stepped down from Neuro ICU on evening of 8/29. After stepdown on evening of 8/29 patient with temperature of 102 F. Rapid response called but no new orders given. Prior to stepdown from Neuo ICU patient switched from IV Zosyn to Cipro after sensitivities returned from respiratory cultures. Patient on 8/30 had another rapid response called as patient developed stridor and labored breathing. Patient given Albuterol nebulizer treatment and nebulized racemic Epinephrine with improvement. Dr. Hitchcock and Neuro ICU team came to bedside and patient did improve after racemic Epi. Patient had ABG done that showed 7.45/31/101/98%. Stridor felt to be mechanical in nature as improved when patient sat up and when his chin was pulled up. Patien sent back to Neuro ICU for close monitoring. Patient did not require reintubation and was aggressively suctioned, placed on cough assist and CPT. Stridor felt mechanical as improved with jaw thrust and holding patient's jaw closed. Patient stepped back down to floor on 9/3. Wound care following patient and patient does have several areas of skin breakdown and most notably unstageable eschar to bilateral ischium and wound care recommending general surgery consult for evaluation and general surgery consulted on step down to floor. Patient's repeat sputum again grew Serratia and Pseudomonas so ID consulted and recommended resuming IV Zosyn to treat pneumonia and to cover wound and recommended to continue until 9/17. Bilateral ischial ulcers with eschar were sharply debrided bedside using #10 scalpel to  healthy bleeding tissue on 9/7. The right ulcer had underlying abscess cavity that tracked inferior and laterally about 2cm. The bases of both ulcers had fibrinous exudate debrided. Both sides packed with wet-to-dry gauze dressings. Patient after stepdown had issues with diarrhea and abdominal pain which was not new as patient had a recent hospitalization at [facility] in 8/2020 for treatment for colitis. GI performed flex sig on  On 9/9 and not acute abnormalities noted and random biopsies taken. Repeat stool studies done and negative for infectious etiology for diarrhea. ID reassessed patient on 9/10 and recommending to extend IV Zosyn until 9/24 to cover infected ischial orders. Diarrhea improving on 9/11. Rectal tube place to help prevent contamination of open wounds on bilateral ischium. Patient with decreased stool since started on scheduled Imodium on 9/11. PICC line team consulted and midline placed for long term antibiotics for home on 9/14. Home Health orders written and likely home discharge with  nursing and home infusion for antibiotics by end of this week. Diarrhea is greatly improved with scheduled Imodium. Final pathology from colon biopsies showed colonic mucosa with mild acute colitis, glandular atrophy/dropout and patchy apoptosis and considerations include infection and drug/toxin. No features chronic inflammatory bowel disease. CMV negative.  Infectious work-up was negative so doubt infectious cause. Patient with fever earlier this am around midnight on 9/16 to 101F . Tylenol given and fever resolved. U/A and blood cultures ordered this am to evaluate. Patient's lungs remain clear on exam and breathing normally and in no distress. Source of fever unclear at this time and will monitor for now.           Interval History: Patient with fever to 101 F around midnight earlier in the morning. Patient given Tylenol with relief. Patient remains on room air and lungs remain clear on exam so doubt  pulmonary cause of fever. Patient does have a Iglesias so will get U/A to evaluate and check blood culture. Patient's midline was just placed on 9/14 so not a source. Patient does have bilateral ischial ulcers so they could be potential source. For now do not plan on broadening antibiotics. Patient remains on IV Zosyn for treatment of infected ischial ulcers. I updated patient's mother, Leni who is also a patient of mine about new developments so any discharge plans for Glen on hold at this time until fever work-up complete. Patient may need another repeat CT scan of pelvic area to reassess infection in that area if rest of infection work-up is unremarkable. SW/CM continuing on working on infusion company to supply home IV abx and HH orders done. Patient with new fever development so explained to his mother that he is not medically ready at this time. She likely will be ready for hospital discharge tomorrow as she is improving and doing better with therapy and getting around better and was asking about possibly going home tomorrow.     Review of Systems   Unable to perform ROS: Patient nonverbal     Objective:     Vital Signs (Most Recent):  Temp: 98.3 °F (36.8 °C) (09/16/20 1220)  Pulse: 101 (09/16/20 1220)  Resp: 18 (09/16/20 1220)  BP: 116/68 (09/16/20 1220)  SpO2: 95 % (09/16/20 1220) Vital Signs (24h Range):  Temp:  [98.3 °F (36.8 °C)-101 °F (38.3 °C)] 98.3 °F (36.8 °C)  Pulse:  [] 101  Resp:  [18-20] 18  SpO2:  [92 %-98 %] 95 %  BP: (116-133)/(65-82) 116/68     Weight: 33.1 kg (73 lb)  Body mass index is 15.8 kg/m².    Intake/Output Summary (Last 24 hours) at 9/16/2020 1420  Last data filed at 9/15/2020 1700  Gross per 24 hour   Intake --   Output 750 ml   Net -750 ml      Physical Exam  Vitals signs and nursing note reviewed.   Constitutional:       General: He is not in acute distress.     Appearance: He is cachectic. He is ill-appearing (Chronic).   Eyes:      Conjunctiva/sclera: Conjunctivae normal.    Neck:      Trachea: No abnormal tracheal secretions.   Cardiovascular:      Rate and Rhythm: Normal rate and regular rhythm.      Heart sounds: Normal heart sounds. No murmur. No gallop.    Pulmonary:      Effort: Pulmonary effort is normal. No respiratory distress.      Breath sounds: Normal breath sounds. No wheezing or rales.   Abdominal:      General: Abdomen is flat. Bowel sounds are normal. There is no distension.      Palpations: Abdomen is soft.      Comments: PEG tube in place   Genitourinary:     Comments: Iglesias in place  Neurological:      Mental Status: He is alert.      Comments: Contractures to all extremities noted         Significant Labs:   CBC:   Recent Labs   Lab 09/15/20  0407 09/16/20  0606   WBC 9.42 11.91   HGB 8.9* 10.5*   HCT 29.7* 34.2*   * 911*     CMP:   Recent Labs   Lab 09/16/20  0606      K 4.3      CO2 24   GLU 94   BUN 8   CREATININE 0.6   CALCIUM 9.4   ANIONGAP 12   EGFRNONAA >60.0       Significant Imaging: I have reviewed all pertinent imaging results/findings within the past 24 hours.      Assessment/Plan:      * Pneumonia due to Pseudomonas aerginuosa and Serratia marcesens  · Resolved. Patient completed abx course for pneumonia with IV Zosyn. Patient not requiring oxygen and respiratory status is stable.   · Patient admitted with acute hypoxic respiratory failure and sepsis. Blood and urine cultures negative and C. Diff negative but respiratory culture grew Serratia and Pseudomonas. Patient treated with IV Zosyn and switched to IV Cipro and treatment stopped on 9/3 as presumably had completed treatment course for pneumonia but continued to spike fever and repeat sputum cultures on 9/6 again grew Serratia so ID consulted on 9/3 and recommending resuming IV Zosyn to treat. ID recommending to extend IV Zosyn until 9/24 to cover both wounds and pneumonia.   · Patient intubated on admit to Neuro ICU and required mechanical ventilation intimally but able to be  extubated and weaned to room air and remains on room air. Patient now on room air and breathing on his own.       Fever  · Patient with fever early this am after midnight on  to 101 F. Patient's WBC 11,900 but platelet count increased so concerning for developing infection.  · Will check U/A as michelle has a Iglesias and blood culture.  · Lungs are clear on exam and patient not hypoxic and breathing is normal and lungs clear on exam so will hold off on CXR at this time.  · Diarrhea improving and he had extensive infectious work-up that was unremarkable. Midline placed on  so not source.  · Will continue IV Zosyn for now and not broaden antibiotics. Main concern for source would be his ischial ulcers and may need to consider repeat imaging of pelvis area if fevers persist.     Decubitus ulcer of ischial area, left, stage IV  Pressure injury of right ischium, stage 4  · Noted on admit to have bilateral ischial ulcers with eschar. Wound  consulted and recommended frequent turning and low air loss mattress. Wound care re-evaluated on  and recommended general surgery to evaluate as concerned wounds appeared to be worsening.  · General surgery evaluated and at first did not feel intervention needed but ulcers continued ot worse so sharp debridement of ulcers done on  at bedside and abscess cavity and tunneling noted to right ischial ulcers that was drained by General surgery. Patient was debrided to healthy tissue by surgery.  · CT scan of pelvis done and no bone involvement noted with ulcers to suggest underlying osteomyelitis.   · Continue wound care as per surgery recs with packing with wet to dry gauze dressings BID and prn.   · No cultures were taken of abscess site by surgery at time of debridement so no causative organism identiifed.   · ID recommmended to continue IV Zosyn 4.5 grams IV every 8 hours to treat until 2020. Plan to switch to continuous IV Zosyn infusion on hospital discharge for  ease of use for family. Midline placed by midline team to left arm on 9/14. CM/SW working on HH and home IV infusion when medically ready.   · Turn patient every 2 hours and low air loss mattress. Rectal tube to help prevent stool from getting into wounds.    Seizure disorder  Chronic and controlled. EEG done on this admit showed no status epilepticus. Continue Phenobarbital 100 mg nightly via G tube to treat as patient takes at home       Diarrhea  Proctocolitis  · Improved with scheduled Imodium and will continue at present as seems to be helping his diarrhea. Colon biopsies still pending.   · C dff negative on admit.  · Diarrhea worsened in hospital. Patient with similar episode in 8/2020 so GI consulted for evaluation of diarrhea. Repeat CT scan on this admit on 9/4 showed persistent proctocolitis and inflammatory changes in colon area as seen on previous imaging.   · GI consulted and performed flex sig on 9/9 to evaluate and showed no gross abnormalities, biopsies taken. Repeat stool studies on this admit negative for infectious etiology. Pathology from colon biopsies showed mild coxitis but no signs of inflammatory bowel disease and negative for CMV. Appearance of biopsy consistent with drug/toxin or infectious etiology. Patient had an infectious work-up and was negative. No obvious drug or toxin cause. Improved with Imodium.   · As per GI, give trial of scheduled Imodium to see if helps so will try Imodium 2 mg po 4 times daily. If that fails then could consider trial of cholestyramine.     Anemia of chronic disease  Acute blood loss anemia  · Chronic and controlled. Monitor with daily CBC and transfuse if Hgb < 7.   · Hgb dropped to 6.7 on 9/8 and patient transfused 1 unit of PRBCs. FOBT done and heme positive. GI consulted and flex sig done on 9/9 and unremarkable.  · Hgb stable after blood transfusion on 8/8 and stable at 9-10.  · Monitor with daily CBC and transfuse if Hgb < 7.       Severe  protein-calorie malnutrition  · Patient with obvious muscle wasting and BMI of 15.8. Nutrition consulted on admit and following patient in hospital and providing recommendations.   · Patient started on TF with Peptamen 1.5 with Prebio at 40 cc/hr and continued in hospital and nutrition following.  · Nutrition recommended adding Zinc, MVI and Vitamin C to help with wound healing. MVI should contain enough Zinc for wound healing and added Vit C 500 mg via PEG with MVI once daily via PEG on 9/15. Zinc only comes in capsules so cannot give to patient as has a PEG tube and strict NPO.       Cerebral palsy  Patient with poor functional baseline and bed bound with extremity contractures and total care at home. Patient at baseline function in hospital.         VTE Risk Mitigation (From admission, onward)         Ordered     heparin (porcine) injection 5,000 Units  Every 8 hours      09/09/20 1644     Place sequential compression device  Until discontinued      08/26/20 0210     IP VTE LOW RISK PATIENT  Once      08/26/20 0210                Discharge Planning   ROCÍO: 9/18/2020     Code Status: Full Code   Is the patient medically ready for discharge?: No    Reason for patient still in hospital (select all that apply): Patient new problem and Patient trending condition; Developed new fever on 9/16 and work-up in progress.; CM/SW working on home IV infusion therapy for home. Patient has left midline in place that was placed on 9/14. Patient still with Iglesias and rectal tube.   Discharge Plan A: Home Health, Home with family   Discharge Delays: None known at this time        Nohemi Farris MD  Department of Hospital Medicine   [facility]

## 2020-09-17 ENCOUNTER — TELEPHONE (OUTPATIENT)
Dept: GASTROENTEROLOGY | Facility: CLINIC | Age: 25
End: 2020-09-17

## 2020-09-17 LAB
BASOPHILS # BLD AUTO: 0.03 K/UL (ref 0–0.2)
BASOPHILS NFR BLD: 0.3 % (ref 0–1.9)
CRP SERPL-MCNC: 90.7 MG/L (ref 0–8.2)
DIFFERENTIAL METHOD: ABNORMAL
EOSINOPHIL # BLD AUTO: 0 K/UL (ref 0–0.5)
EOSINOPHIL NFR BLD: 0.1 % (ref 0–8)
ERYTHROCYTE [DISTWIDTH] IN BLOOD BY AUTOMATED COUNT: 15.8 % (ref 11.5–14.5)
HCT VFR BLD AUTO: 29.7 % (ref 40–54)
HGB BLD-MCNC: 9.3 G/DL (ref 14–18)
IMM GRANULOCYTES # BLD AUTO: 0.03 K/UL (ref 0–0.04)
IMM GRANULOCYTES NFR BLD AUTO: 0.3 % (ref 0–0.5)
LYMPHOCYTES # BLD AUTO: 1.3 K/UL (ref 1–4.8)
LYMPHOCYTES NFR BLD: 13.6 % (ref 18–48)
MCH RBC QN AUTO: 30.7 PG (ref 27–31)
MCHC RBC AUTO-ENTMCNC: 31.3 G/DL (ref 32–36)
MCV RBC AUTO: 98 FL (ref 82–98)
MONOCYTES # BLD AUTO: 1.2 K/UL (ref 0.3–1)
MONOCYTES NFR BLD: 12.8 % (ref 4–15)
NEUTROPHILS # BLD AUTO: 7 K/UL (ref 1.8–7.7)
NEUTROPHILS NFR BLD: 72.9 % (ref 38–73)
NRBC BLD-RTO: 0 /100 WBC
PLATELET # BLD AUTO: 729 K/UL (ref 150–350)
PMV BLD AUTO: 9 FL (ref 9.2–12.9)
PROCALCITONIN SERPL IA-MCNC: 0.08 NG/ML
RBC # BLD AUTO: 3.03 M/UL (ref 4.6–6.2)
WBC # BLD AUTO: 9.58 K/UL (ref 3.9–12.7)

## 2020-09-17 PROCEDURE — 25000003 PHARM REV CODE 250: Performed by: PHYSICIAN ASSISTANT

## 2020-09-17 PROCEDURE — 63600175 PHARM REV CODE 636 W HCPCS: Performed by: HOSPITALIST

## 2020-09-17 PROCEDURE — 86140 C-REACTIVE PROTEIN: CPT

## 2020-09-17 PROCEDURE — 25000003 PHARM REV CODE 250: Performed by: STUDENT IN AN ORGANIZED HEALTH CARE EDUCATION/TRAINING PROGRAM

## 2020-09-17 PROCEDURE — 63600175 PHARM REV CODE 636 W HCPCS: Performed by: INTERNAL MEDICINE

## 2020-09-17 PROCEDURE — 85025 COMPLETE CBC W/AUTO DIFF WBC: CPT

## 2020-09-17 PROCEDURE — 11000001 HC ACUTE MED/SURG PRIVATE ROOM

## 2020-09-17 PROCEDURE — 99233 PR SUBSEQUENT HOSPITAL CARE,LEVL III: ICD-10-PCS | Mod: ,,, | Performed by: HOSPITALIST

## 2020-09-17 PROCEDURE — 84145 PROCALCITONIN (PCT): CPT

## 2020-09-17 PROCEDURE — 94761 N-INVAS EAR/PLS OXIMETRY MLT: CPT

## 2020-09-17 PROCEDURE — 99900035 HC TECH TIME PER 15 MIN (STAT)

## 2020-09-17 PROCEDURE — 36415 COLL VENOUS BLD VENIPUNCTURE: CPT

## 2020-09-17 PROCEDURE — 99233 SBSQ HOSP IP/OBS HIGH 50: CPT | Mod: ,,, | Performed by: HOSPITALIST

## 2020-09-17 PROCEDURE — 94668 MNPJ CHEST WALL SBSQ: CPT

## 2020-09-17 PROCEDURE — 25000003 PHARM REV CODE 250: Performed by: INTERNAL MEDICINE

## 2020-09-17 PROCEDURE — 25000003 PHARM REV CODE 250: Performed by: HOSPITALIST

## 2020-09-17 RX ADMIN — LOPERAMIDE HYDROCHLORIDE 2 MG: 1 SOLUTION ORAL at 10:09

## 2020-09-17 RX ADMIN — PIPERACILLIN SODIUM AND TAZOBACTAM SODIUM 4.5 G: 4; .5 INJECTION, POWDER, LYOPHILIZED, FOR SOLUTION INTRAVENOUS at 07:09

## 2020-09-17 RX ADMIN — Medication 1 CAPSULE: at 10:09

## 2020-09-17 RX ADMIN — PIPERACILLIN SODIUM AND TAZOBACTAM SODIUM 4.5 G: 4; .5 INJECTION, POWDER, LYOPHILIZED, FOR SOLUTION INTRAVENOUS at 01:09

## 2020-09-17 RX ADMIN — LOPERAMIDE HYDROCHLORIDE 2 MG: 1 SOLUTION ORAL at 02:09

## 2020-09-17 RX ADMIN — PIPERACILLIN SODIUM AND TAZOBACTAM SODIUM 4.5 G: 4; .5 INJECTION, POWDER, LYOPHILIZED, FOR SOLUTION INTRAVENOUS at 10:09

## 2020-09-17 RX ADMIN — GABAPENTIN 250 MG: 250 SOLUTION ORAL at 09:09

## 2020-09-17 RX ADMIN — BACLOFEN 10 MG: 10 TABLET ORAL at 10:09

## 2020-09-17 RX ADMIN — LOPERAMIDE HYDROCHLORIDE 2 MG: 1 SOLUTION ORAL at 09:09

## 2020-09-17 RX ADMIN — BACLOFEN 10 MG: 10 TABLET ORAL at 02:09

## 2020-09-17 RX ADMIN — HEPARIN SODIUM 5000 UNITS: 5000 INJECTION INTRAVENOUS; SUBCUTANEOUS at 02:09

## 2020-09-17 RX ADMIN — HEPARIN SODIUM 5000 UNITS: 5000 INJECTION INTRAVENOUS; SUBCUTANEOUS at 09:09

## 2020-09-17 RX ADMIN — Medication 10 ML: at 10:09

## 2020-09-17 RX ADMIN — PHENOBARBITAL 100 MG: 20 ELIXIR ORAL at 09:09

## 2020-09-17 RX ADMIN — PROPRANOLOL HYDROCHLORIDE 20 MG: 20 TABLET ORAL at 07:09

## 2020-09-17 RX ADMIN — LOPERAMIDE HYDROCHLORIDE 2 MG: 1 SOLUTION ORAL at 07:09

## 2020-09-17 RX ADMIN — Medication 500 MG: at 10:09

## 2020-09-17 RX ADMIN — HEPARIN SODIUM 5000 UNITS: 5000 INJECTION INTRAVENOUS; SUBCUTANEOUS at 05:09

## 2020-09-17 RX ADMIN — DIPHENHYDRAMINE HYDROCHLORIDE 12.5 MG: 25 SOLUTION ORAL at 09:09

## 2020-09-17 RX ADMIN — PROPRANOLOL HYDROCHLORIDE 20 MG: 20 TABLET ORAL at 10:09

## 2020-09-17 RX ADMIN — BACLOFEN 10 MG: 10 TABLET ORAL at 09:09

## 2020-09-17 RX ADMIN — ACETAMINOPHEN 650 MG: 325 TABLET ORAL at 05:09

## 2020-09-17 NOTE — PLAN OF CARE
09/17/20 1412   Post-Acute Status   Post-Acute Authorization Other   Other Status Community Services       Called and spoke with Dora at [facility] ( [phone]). She is the  for the patient. Left a VM with her to let her know that the patient's plan is to D/C tomorrow.  SW in contact with CM and Medical staff. Will continue to follow and offer support as needed.     Oziel Gutierrez, LU  [facility]   Ext. [phone]

## 2020-09-17 NOTE — PROGRESS NOTES
Progress Note   Hospital Medicine         Patient Name: Glen Moscoso  MRN:  [id number]  [facility] Medicine Team: [facility] [place] Quiana Armenta MD  Date of Admission:  8/26/2020     Length of Stay:  LOS: 22 days   Expected Discharge Date: 9/18/2020  Principal Problem:  Pneumonia due to Pseudomonas species       Subjective:     Interval History/Overnight Events:    Fever overnight x 2 to 100 and 101. CRP 98 to 90, procal neg, no signs of resp issues and on RA, lung exam clear, UA clear, blood CX NG, sacral wounds healing- per wound care do not need debridement and look better on repeat eam now, WBC stable at 11, watching closely now, discussed with mom on phone and in person ad she is discharging today. On exam he looks comfortable, peg site not inflammed. Diarrhea ipmroved. Unclear source, will check LE U/S also. Had a good bit of blankets and was sweaty, hector try not to bundle up so much also in case blankets may be also contribting but dont want to rule out a legit cause as he is unable to tell us given baseline CP and many med issues recently. Updated WC recs for HH, trending exam.   If fever curve can stay on track and no source found of insidous cause, may be able to dc Friday vs weekend.             Review of Systems   Unable to assess secondary to baseline cognitive status.  All other systems reviewed and are negative.    Objective:     Temp:  [98.3 °F (36.8 °C)-100.5 °F (38.1 °C)]   Pulse:  []   Resp:  [16-20]   BP: (116-133)/(59-81)   SpO2:  [94 %-97 %]       Physical Exam:  Constitutional: Appears underweight, chronically ill baseline. Nonverbal. Contractures.  Head: Normocephalic and atraumatic.   Mouth/Throat: Oropharynx is clear and moist.   Eyes: EOM are normal. Pupils are equal, round, and reactive to light. No scleral icterus.   Neck: Normal range of motion. Neck supple. some contracture in neck.  Cardiovascular: Normal rate and regular rhythm.  No murmur heard.  Pulmonary/Chest: Effort normal and  breath sounds normal. Coarse breath sounds. On room air.   Abdominal: Soft. Bowel sounds are normal.  No distension or tenderness. G tube.rizvi in place. Rectal tube. PEG no redness nor pain  Musculoskeletal: muscle wasting. Contractures of both legs. No edema.  Neurological: not alert to place or person, baseline status. No gross abnormalities on minimal neuro exam- moving all 4 extremities. Cannot assess cranial nerves or sensation. bedbound baseline. Contractures.  Skin: Skin is warm and dry. sacral ulcerations with full thickness breakdown seen with black nonviable tissue (See wound care pictures). Keloid type changes on bilatearl hips. Peripheral IV in left arm.  Psychiatric: Normal mood and affect. Behavior is normal.     Recent Labs   Lab 09/12/20  0407 09/13/20  0537 09/14/20  0603 09/15/20  0407 09/16/20  0606 09/17/20  0601   WBC 8.05 7.85 8.73 9.42 11.91 9.58   HGB 8.7* 9.4* 8.8* 8.9* 10.5* 9.3*   HCT 29.1* 30.7* 30.0* 29.7* 34.2* 29.7*   * 859* 797* 830* 911* 729*     Recent Labs   Lab 09/11/20  0509 09/14/20  0848 09/16/20  0606    139 138   K 3.8 3.9 4.3    106 102   CO2 23 26 24   BUN 8 7 8   CREATININE 0.5 0.5 0.6   * 107 94   CALCIUM 8.3* 8.5* 9.4   MG 1.7  --   --    PHOS 3.1 2.9  --      Recent Labs   Lab 09/11/20  0509 09/14/20  0848   ALKPHOS 109  --    ALT 13  --    AST 14  --    ALBUMIN 1.9* 1.8*   PROT 6.3  --    BILITOT 0.2  --      No results for input(s): POCTGLUCOSE in the last 168 hours.     ascorbic acid (vitamin C)  500 mg Per G Tube Daily    baclofen  10 mg Per G Tube TID    diphenhydrAMINE  12.5 mg Per G Tube QHS    gabapentin  250 mg Per G Tube QHS    heparin (porcine)  5,000 Units Subcutaneous Q8H    Lactobacillus rhamnosus GG  1 capsule Per G Tube Daily    loperamide  2 mg Per G Tube QID    multivitamin liquid no.118  10 mL Per G Tube Daily    PHENobarbitaL  100 mg Per G Tube QHS    piperacillin-tazobactam (ZOSYN) IVPB  4.5 g Intravenous Q8H     propranoloL  20 mg Per G Tube TID       Assessment and Plan     Mr. Glen Moscoso is a 24 y.o. male who presented to [facility] on 8/26/2020 with     Acute respiratory failure with hypoxia  Serratia/Pseudomonas Pneumonia--> with reoccurrent Resistant pneumonia     · Patient intubated upon transfer from outside facility and extubated 8/27  ·  Patient found to have pneumonia and felt cause of respiratory failure, treated with course of cipro, stepped back up to ICU 8/31 for worse resp fx and stridor, improved with chin thrust manuevers but still required extra stay, requires frequent suctioning for this  · Sputum Cx growing serratia again on 8/31 while on treatment with cipro, still febrile every day to 101 multiple times, sputum less per mom and CXR improved but still has episodes likely aspiration with increased HR, and fever, on RA with sats 92-98% now. Sensitivities returned on 9/3 with Cipro resistant now, zosyn started, ID following, recommend to continue, chest CT with R GOO likely the pna, no clots. Has been on RA since SD.  · Recheck sputum 9/6 for fever, with same serratia and sensitiivty. Per ID rec  Now 3 weeks antibiotics now (9/24). Had PICC>                Biphasic stridor  · Patient with acute development of stridor after extubation of 8/27 but acutely worsened on 8/30.   · Likely mechanical due to neck muscle weakness related to his advance cerebral palsy as improved when patient was sat up and chin was pulled up) and recent acute illnesses but also likely a degree of airway inflammation as contributing to upper air obstruction.   · Patient should be at 90 degree angle at all times to help with breathing mechanics and will monitor.  · Stepped back to ICU 8/31 for this, requires frequent suctioning, jaw thrust improves this, mom reports improving now on step down 9/3, continue nebs and supportive care  · No issues since     Pneumonia due to Pseudomonas aerginuosa and Serratia marcesens  · Patient admitted  with acute hypoxic respiratory failure and sepsis. Blood and urine cultures negative and C. Diff negative but respiratory culture grew Serratia and Pseudomonas. Procal improved since initiation of antibiotics and WBC normalized and sepsis resolved (procal 4--. 3)  -see above for acute resp failure, ID consulted 9/3 for 8/31 serratia with FQ resistance now, Zosyn started. Continue now.   -1 week at least per ID initially but given persistance now rec 2 weeks (9/24)        Encephalopathy acute  · Improved, at baseline on step down 9/3 per mom, non verbal at baseline but interacts with his mom somewhat  · Patient with decreased mentation from baseline on admit. Patient transferred to [facility] for further evaluation. EEG done and showed no status epilepticus and encephalopathy felt related to sepsis/infection.   · No issues since then    Decubitus ulcer of left buttock, left ischial area, right ischial area, stage 4    Wounds present on admit and wound care, worsened since admission from stage 2 to 4 with picture documentation from wound care- mom reports had very bad stool issues a month ago causing these worsening wounds prior to admit  -nonviable tissue to sacrum (See pictures)  -gen surg consultd 9/3 for debridmenet, consulted, feel tissue does not need debridement now on exam but likely were looking at area that wound care was not concerned about as wound care called 9/4 and stated this, will talk to them again after CT results below.  -CT abdomen/pelvis not showing bone involved, debrided with gen surg 9/7, right side with underlying abscess cavity tracking inferior and laterally about 2 cm, packed with wet to dry gauze dressings, BID drsesing changes needed  Continue to Turn every 2 hrs. Pad bony prominences with pillows or foam dressings.  -zosyn will cover the abscess cavity per ID as no CX taken, rec zosyn until 9/24. Has midline left arm. Low air loss matress for home with hospital bed. Has rectal tube  in place to prevent stool in currently.  -updated home WC recs 9/17, improving per Twan, no need for debridment he can see or worsening wounds to be fever source based on their exam, he knows patient well on prev weeks         Seizure disorder  Chronic and controlled. EEG done on this admit showed no status epilepticus.   -Continue Phenobarbital 100 mg nightly via G tube to treat as patient takes at home         Anemia of chronic disease  Acute blood loss anemia  Chronic and controlled. Monitor with daily CBC and transfuse if Hgb < 7.   -Hg between 7-8 now but has has had slow downtick to lower 7s now.  9/8: dipped again to 6.7 from 7.8 the last few days with FOBT + in setting of imaging for colitis. 1 U PRBCS. Hold heparin, protonix PO as not suspected upper GI bleed.  9/9: now up at 10, overcorrected, so may expect downtick tomorrow again   -has been stable since     Hypokalemia  Hypophosphatemia  -replace PRN               Severe protein-calorie malnutrition  · Patient with obvious muscle wasting and BMI of 15.8. Nutrition consulted on admit.   · Patient started on TF with Peptamen 1.5 with prebio at 40 cc/hr.   · Nutrition recommended adding Zinc, MVI and Vitamin C to help with wound healing. MVI should contain enough Zinc for wound healing and added Vit C 500 mg via PEG with MVI once daily via PEG on 9/15. Zinc only comes in capsules so cannot give to patient as has a PEG tube and strict NPO.        Cerebral palsy  Patient with poor functional baseline and bed bound with extremity contractures and total care at home.    Line associated UE clot  -line removed after Left basilic thrombus found in fever work up. RIght cephalic not well visualized, cannot rule out  -as line associated can consider anticoag however is superficial vein, so will do ppx only now    Fever  -on 9/16 x 2. Concern could be coming from ischial ulcers, no worsening resp status  - check LE U/S given sedentary status for DVT  -on zosyn now,  blood Cx 9/16 NGTD  -UA without signs of infection  -may have to reimage CT abdomen if persists but wound care states exam beter. CRP 98-->90. procal neg          Diarrhea  Protocolitis  -C dff negative, stool studies negative  -tube feeds could be contributor but have changed since home and diarrhea has persisted since then, adjusted here since admission.  -seen in CT early august and again 9/4  · GI consulted and performed flex sig on 9/9 to evaluate and showed no gross abnormalities, biopsies taken. Repeat stool studies on this admit negative for infectious etiology. Pathology from colon biopsies showed mild coxitis but no signs of inflammatory bowel disease and negative for CMV. Appearance of biopsy consistent with drug/toxin or infectious etiology. Patient had an infectious work-up and was negative. No obvious drug or toxin cause. Improved with Imodium.   As per GI, give trial of scheduled Imodium to see if helps so will try Imodium 2 mg po 4 times daily. If that fails then could consider trial of cholestyramine.     Sleep Disturbance  -resumed home bendadryl and neurontin qhs          Diet:  Tube feeds.     DVT PPx:  TEDS/SCDS, heparin             Disposition:    Fever overnight. US LE, pending fevers, if improve and no insidous cause, psosible dc tomorrow vs weekend if persists to further watch        Ultimate plan is HH with mom with DME and close follow ups      Discharge Planning   ROCÍO: 9/18/2020     Code Status: Full Code   Is the patient medically ready for discharge?: No    Reason for patient still in hospital (select all that apply): Patient unstable  Discharge Plan A: Home Health, Home with family   Discharge Delays: None known at this time

## 2020-09-17 NOTE — PLAN OF CARE
Patient with fever workup.  Planned discharge is home with family with home health with ivab therapy - Plan (A) or home with family - Plan (B).    Mattress ordered for patient through DME, outsourced to [employer].  They will call patient mother once auth rec'd.   09/17/20 2545   Discharge Reassessment   Assessment Type Discharge Planning Reassessment   Provided patient/caregiver education on the expected discharge date and the discharge plan Yes   Do you have any problems affording any of your prescribed medications? No   Discharge Plan A Home with family;Home Health   Discharge Plan B Home with family   DME Needed Upon Discharge  other (see comments)  (mattress)   Patient choice form signed by patient/caregiver N/A   Anticipated Discharge Disposition IV Therapy   Can the patient/caregiver answer the patient profile reliably? No, cognitively impaired   Describe the patient's ability to walk at the present time. Does not walk or unable to take any steps at all   How often would a person be available to care for the patient? Whenever needed   Post-Acute Status   Post-Acute Authorization Home Health   Home Health Status Awaiting Internal Medical Clearance   Discharge Delays None known at this time

## 2020-09-17 NOTE — TELEPHONE ENCOUNTER
----- Message from America Goode MD sent at 9/17/2020  1:59 PM CDT -----  PATH:    Please let the pt know that pathology from recent procedure shows:    Nonspecific irritation in the rectum.  No IBD or CMV infection. May be related to rectal prolapse/   No other significant abnormality      Endoscopy and colonoscopy is not a perfect exam of the colon. Rarely a significant polyp or colon cancer can be missed. He/she should not ignore symptoms such as blood with bowel movements or abdominal pain and report these symptoms to the doctor if they occur.     Should follow up in general gi clinic

## 2020-09-17 NOTE — CONSULTS
Patient seen for wound care follow up for ischial pressure injuries.    Slough and fibrinous tissue to wound beds/bases to ischial pressure injuries appear to be improving. Bone palpable during assessment to right ischium at undermining location. No bone palpable or noted to left ischium.   Sizewise immerse surface being utilized.  Flexiseal noted.    Recommendations:  Bilateral ischial and scrotal skin breakdown: BID/prn clean with wound cleanser, apply sensicare skin barrier wipe to periwounds, pack ischial pressure injuries with triad barrier cream and gauze, cover with foam dressing. Apply Triad over scrotum skin breakdown.    Discussed wound care assessment and recommendations with Dr. Quiana Armenta.    Nursing to continue care. Wound care to follow prn.      Left ischial Stage 4 pressure injury:  Full thickness skin loss. 6cm x 7cm x 1.5cm, 0.5cm undermining noted at 9 oclock. 50% yellow slough/Fibrin, 50% red tissue. Adipose, muscle exposed, no bone involvement noted. Scant serosanguineous drainage, no odor. Macerated wound edges.  Scrotum: yellow/brown fibrinous tissue 0.3cm x 0.6cm    Right ischium Stage 4 pressure injury:  Full thickness skin loss. 6cm x 5cm x 1.5cm, 2cm undermining at 9 oclock.  90% moist red wound base, 10% fibrinous tissue, Bone palpable at undermining, Adipose, muscle exposed. Small serosanguineous drainage, no odor. Macerated periwound, pink periwound.

## 2020-09-17 NOTE — PLAN OF CARE
Mother had reached out to ANG[employer]O about mattress for bed at home.  ANGELO in communication with Jaki with [facility] [employer] who stated orderes sent to outside company [employer].  They had tried reaching mother with no answer. Jaki will advise them to call mom about mattress, as they are waiting on insurance auth.

## 2020-09-18 VITALS
HEIGHT: 60 IN | WEIGHT: 73 LBS | DIASTOLIC BLOOD PRESSURE: 63 MMHG | TEMPERATURE: 98 F | RESPIRATION RATE: 18 BRPM | BODY MASS INDEX: 14.33 KG/M2 | SYSTOLIC BLOOD PRESSURE: 120 MMHG | OXYGEN SATURATION: 95 % | HEART RATE: 112 BPM

## 2020-09-18 LAB
ANION GAP SERPL CALC-SCNC: 11 MMOL/L (ref 8–16)
BASOPHILS # BLD AUTO: 0.03 K/UL (ref 0–0.2)
BASOPHILS NFR BLD: 0.4 % (ref 0–1.9)
BUN SERPL-MCNC: 6 MG/DL (ref 6–20)
CALCIUM SERPL-MCNC: 8.7 MG/DL (ref 8.7–10.5)
CHLORIDE SERPL-SCNC: 102 MMOL/L (ref 95–110)
CO2 SERPL-SCNC: 24 MMOL/L (ref 23–29)
CREAT SERPL-MCNC: 0.5 MG/DL (ref 0.5–1.4)
DIFFERENTIAL METHOD: ABNORMAL
EOSINOPHIL # BLD AUTO: 0.4 K/UL (ref 0–0.5)
EOSINOPHIL NFR BLD: 5 % (ref 0–8)
ERYTHROCYTE [DISTWIDTH] IN BLOOD BY AUTOMATED COUNT: 15.8 % (ref 11.5–14.5)
EST. GFR  (AFRICAN AMERICAN): >60 ML/MIN/1.73 M^2
EST. GFR  (NON AFRICAN AMERICAN): >60 ML/MIN/1.73 M^2
GLUCOSE SERPL-MCNC: 84 MG/DL (ref 70–110)
HCT VFR BLD AUTO: 29.3 % (ref 40–54)
HGB BLD-MCNC: 9 G/DL (ref 14–18)
IMM GRANULOCYTES # BLD AUTO: 0.03 K/UL (ref 0–0.04)
IMM GRANULOCYTES NFR BLD AUTO: 0.4 % (ref 0–0.5)
LYMPHOCYTES # BLD AUTO: 1.5 K/UL (ref 1–4.8)
LYMPHOCYTES NFR BLD: 18.5 % (ref 18–48)
MCH RBC QN AUTO: 30.1 PG (ref 27–31)
MCHC RBC AUTO-ENTMCNC: 30.7 G/DL (ref 32–36)
MCV RBC AUTO: 98 FL (ref 82–98)
MONOCYTES # BLD AUTO: 1.1 K/UL (ref 0.3–1)
MONOCYTES NFR BLD: 13.9 % (ref 4–15)
NEUTROPHILS # BLD AUTO: 5.1 K/UL (ref 1.8–7.7)
NEUTROPHILS NFR BLD: 61.8 % (ref 38–73)
NRBC BLD-RTO: 0 /100 WBC
PLATELET # BLD AUTO: 657 K/UL (ref 150–350)
PMV BLD AUTO: 9 FL (ref 9.2–12.9)
POTASSIUM SERPL-SCNC: 3.5 MMOL/L (ref 3.5–5.1)
RBC # BLD AUTO: 2.99 M/UL (ref 4.6–6.2)
SODIUM SERPL-SCNC: 137 MMOL/L (ref 136–145)
WBC # BLD AUTO: 8.22 K/UL (ref 3.9–12.7)

## 2020-09-18 PROCEDURE — C1751 CATH, INF, PER/CENT/MIDLINE: HCPCS

## 2020-09-18 PROCEDURE — 36410 VNPNXR 3YR/> PHY/QHP DX/THER: CPT

## 2020-09-18 PROCEDURE — 76937 US GUIDE VASCULAR ACCESS: CPT

## 2020-09-18 PROCEDURE — 99900035 HC TECH TIME PER 15 MIN (STAT)

## 2020-09-18 PROCEDURE — 99239 PR HOSPITAL DISCHARGE DAY,>30 MIN: ICD-10-PCS | Mod: ,,, | Performed by: HOSPITALIST

## 2020-09-18 PROCEDURE — 94761 N-INVAS EAR/PLS OXIMETRY MLT: CPT

## 2020-09-18 PROCEDURE — 25000003 PHARM REV CODE 250: Performed by: PHYSICIAN ASSISTANT

## 2020-09-18 PROCEDURE — 25000003 PHARM REV CODE 250: Performed by: INTERNAL MEDICINE

## 2020-09-18 PROCEDURE — 94668 MNPJ CHEST WALL SBSQ: CPT

## 2020-09-18 PROCEDURE — 25000003 PHARM REV CODE 250: Performed by: STUDENT IN AN ORGANIZED HEALTH CARE EDUCATION/TRAINING PROGRAM

## 2020-09-18 PROCEDURE — 99239 HOSP IP/OBS DSCHRG MGMT >30: CPT | Mod: ,,, | Performed by: HOSPITALIST

## 2020-09-18 PROCEDURE — 63600175 PHARM REV CODE 636 W HCPCS: Performed by: INTERNAL MEDICINE

## 2020-09-18 PROCEDURE — 63600175 PHARM REV CODE 636 W HCPCS: Performed by: HOSPITALIST

## 2020-09-18 PROCEDURE — 85025 COMPLETE CBC W/AUTO DIFF WBC: CPT

## 2020-09-18 PROCEDURE — 80048 BASIC METABOLIC PNL TOTAL CA: CPT

## 2020-09-18 PROCEDURE — 36415 COLL VENOUS BLD VENIPUNCTURE: CPT

## 2020-09-18 PROCEDURE — 27000221 HC OXYGEN, UP TO 24 HOURS

## 2020-09-18 RX ADMIN — Medication 500 MG: at 10:09

## 2020-09-18 RX ADMIN — HEPARIN SODIUM 5000 UNITS: 5000 INJECTION INTRAVENOUS; SUBCUTANEOUS at 06:09

## 2020-09-18 RX ADMIN — BACLOFEN 10 MG: 10 TABLET ORAL at 06:09

## 2020-09-18 RX ADMIN — PIPERACILLIN SODIUM AND TAZOBACTAM SODIUM 4.5 G: 4; .5 INJECTION, POWDER, LYOPHILIZED, FOR SOLUTION INTRAVENOUS at 02:09

## 2020-09-18 RX ADMIN — PROPRANOLOL HYDROCHLORIDE 20 MG: 20 TABLET ORAL at 10:09

## 2020-09-18 RX ADMIN — PIPERACILLIN SODIUM AND TAZOBACTAM SODIUM 4.5 G: 4; .5 INJECTION, POWDER, LYOPHILIZED, FOR SOLUTION INTRAVENOUS at 10:09

## 2020-09-18 RX ADMIN — Medication 1 CAPSULE: at 10:09

## 2020-09-18 RX ADMIN — LOPERAMIDE HYDROCHLORIDE 2 MG: 1 SOLUTION ORAL at 06:09

## 2020-09-18 RX ADMIN — BACLOFEN 10 MG: 10 TABLET ORAL at 10:09

## 2020-09-18 RX ADMIN — Medication 10 ML: at 10:09

## 2020-09-18 RX ADMIN — LOPERAMIDE HYDROCHLORIDE 2 MG: 1 SOLUTION ORAL at 10:09

## 2020-09-18 NOTE — PLAN OF CARE
Bonifacio Roberts reached out to CM to advise that patient does not have a midline.  CM spoke with PICC team and nurse and md to place order in order for the patient to be discharged later this evening.

## 2020-09-18 NOTE — PLAN OF CARE
Problem: Adult Inpatient Plan of Care  Goal: Patient-Specific Goal (Individualization)  Description: Admit Date: 8/26    Admit Dx: Seizures    Past Medical History:  No date: Acid reflux  No date: Cerebral palsy  No date: History of hip surgery  No date: S/P percutaneous endoscopic gastrostomy (PEG) tube placement  No date: Seizures    History reviewed. No pertinent surgical history.    Individualization:   1. Pt likes legs bent and arms elevated    Restraints: N/A         Outcome: Ongoing, Progressing  Goal: Optimal Comfort and Wellbeing  Outcome: Ongoing, Progressing

## 2020-09-18 NOTE — PLAN OF CARE
Patient to be discharged home.  The patient will have home health and home ivab therapy upon discharge.  A low air loss mattress was ordered for patient and sent to [facility] in [place] by [facility].  Mother provided with DME phone number.  Patient to be transported via stretcher.  CM not requested to make any follow up appointments.    Future Appointments   Date Time Provider Department Center   9/24/2020  2:00 PM Miguel Angel Chun MD [facility] ID [address]       09/18/20 7308   Final Note   Assessment Type Final Discharge Note   Anticipated Discharge Disposition Home-Health   Hospital Follow Up  Appt(s) scheduled?   (CM not requested to make any follow up appointments.)   Discharge plans and expectations educations in teach back method with documentation complete? No   Right Care Referral Info   Post Acute Recommendation Home-care  (ivab therapy)   Post-Acute Status   Home Health Status Set-up Complete   Discharge Delays None known at this time

## 2020-09-18 NOTE — NURSING
Paged Internal [facility] to inform the team that the pt's BP did not warrant the administration of propanolol. The Pt's VS were as follows: /71; Pulse rate 103 (pt runs a little sinus tachy); O2 96%; Temp 99.3. The pt tolerated other scheduled meds well. Will attempt to page/call medical team once again. A notation was also made within the MAR concern said BP med.  Will continue to monitor pt accordingly.

## 2020-09-18 NOTE — PLAN OF CARE
09/18/20 1345   Post-Acute Status   Post-Acute Authorization Home Health   Home Health Status Set-up Complete       Pt setup with [facility] and [facility] for Home infusion. Setup transport with EMS for 6:30.  SW in contact with CM and Medical staff. Will continue to follow and offer support as needed.     Oziel Gutierrez LMSW  [facility]   Ext. [phone]

## 2020-09-18 NOTE — CONSULTS
Placed 18g X 8cm midline in left brachial vein of LUE using realtime ultrasound guidance. Lot#[id number]. Remove on or before 10/17/2020.    Line placed by: ALLEN Beach RN

## 2020-09-20 ENCOUNTER — HOSPITAL ENCOUNTER (INPATIENT)
Facility: HOSPITAL | Age: 25
LOS: 19 days | Discharge: NURSING, REHAB OR LONG-TERM CARE | DRG: 871 | End: 2020-10-09
Attending: EMERGENCY MEDICINE | Admitting: INTERNAL MEDICINE
Payer: MEDICAID

## 2020-09-20 DIAGNOSIS — R00.0 SINUS TACHYCARDIA: ICD-10-CM

## 2020-09-20 DIAGNOSIS — K52.9 COLITIS: ICD-10-CM

## 2020-09-20 DIAGNOSIS — J96.01 ACUTE HYPOXEMIC RESPIRATORY FAILURE: ICD-10-CM

## 2020-09-20 DIAGNOSIS — J15.1 PNEUMONIA DUE TO PSEUDOMONAS SPECIES, UNSPECIFIED LATERALITY, UNSPECIFIED PART OF LUNG: ICD-10-CM

## 2020-09-20 DIAGNOSIS — R00.0 TACHYCARDIA: ICD-10-CM

## 2020-09-20 DIAGNOSIS — K66.8 PNEUMOPERITONEUM: ICD-10-CM

## 2020-09-20 DIAGNOSIS — E44.0 MODERATE MALNUTRITION: ICD-10-CM

## 2020-09-20 DIAGNOSIS — M86.9 OSTEOMYELITIS OF PELVIS: ICD-10-CM

## 2020-09-20 DIAGNOSIS — E43 SEVERE PROTEIN-CALORIE MALNUTRITION: ICD-10-CM

## 2020-09-20 DIAGNOSIS — G40.909 SEIZURE DISORDER: ICD-10-CM

## 2020-09-20 DIAGNOSIS — R50.9 FEVER, UNSPECIFIED FEVER CAUSE: ICD-10-CM

## 2020-09-20 DIAGNOSIS — G80.9 CEREBRAL PALSY, UNSPECIFIED TYPE: ICD-10-CM

## 2020-09-20 DIAGNOSIS — L89.324 DECUBITUS ULCER OF ISCHIAL AREA, LEFT, STAGE IV: ICD-10-CM

## 2020-09-20 DIAGNOSIS — L89.314 PRESSURE INJURY OF RIGHT ISCHIUM, STAGE 4: ICD-10-CM

## 2020-09-20 DIAGNOSIS — R07.9 CHEST PAIN: ICD-10-CM

## 2020-09-20 DIAGNOSIS — D63.8 ANEMIA OF CHRONIC DISEASE: ICD-10-CM

## 2020-09-20 DIAGNOSIS — A41.9 SEPSIS, DUE TO UNSPECIFIED ORGANISM, UNSPECIFIED WHETHER ACUTE ORGAN DYSFUNCTION PRESENT: ICD-10-CM

## 2020-09-20 DIAGNOSIS — A41.9 SEPSIS: Primary | ICD-10-CM

## 2020-09-20 DIAGNOSIS — E83.39 HYPOPHOSPHATEMIA: ICD-10-CM

## 2020-09-20 PROBLEM — D62 ACUTE BLOOD LOSS ANEMIA: Status: RESOLVED | Noted: 2020-09-10 | Resolved: 2020-09-20

## 2020-09-20 PROBLEM — R19.7 DIARRHEA: Status: RESOLVED | Noted: 2020-09-10 | Resolved: 2020-09-20

## 2020-09-20 LAB
ALBUMIN SERPL BCP-MCNC: 2.2 G/DL (ref 3.5–5.2)
ALP SERPL-CCNC: 108 U/L (ref 55–135)
ALT SERPL W/O P-5'-P-CCNC: 24 U/L (ref 10–44)
ANION GAP SERPL CALC-SCNC: 12 MMOL/L (ref 8–16)
AST SERPL-CCNC: 26 U/L (ref 10–40)
BASOPHILS # BLD AUTO: 0.03 K/UL (ref 0–0.2)
BASOPHILS NFR BLD: 0.2 % (ref 0–1.9)
BILIRUB SERPL-MCNC: 0.2 MG/DL (ref 0.1–1)
BUN SERPL-MCNC: 7 MG/DL (ref 6–20)
CALCIUM SERPL-MCNC: 8.3 MG/DL (ref 8.7–10.5)
CHLORIDE SERPL-SCNC: 101 MMOL/L (ref 95–110)
CO2 SERPL-SCNC: 22 MMOL/L (ref 23–29)
CREAT SERPL-MCNC: 0.5 MG/DL (ref 0.5–1.4)
CRP SERPL-MCNC: 187.5 MG/L (ref 0–8.2)
DIFFERENTIAL METHOD: ABNORMAL
EOSINOPHIL # BLD AUTO: 0 K/UL (ref 0–0.5)
EOSINOPHIL NFR BLD: 0.1 % (ref 0–8)
ERYTHROCYTE [DISTWIDTH] IN BLOOD BY AUTOMATED COUNT: 15.3 % (ref 11.5–14.5)
ERYTHROCYTE [SEDIMENTATION RATE] IN BLOOD BY WESTERGREN METHOD: 90 MM/HR (ref 0–23)
EST. GFR  (AFRICAN AMERICAN): >60 ML/MIN/1.73 M^2
EST. GFR  (NON AFRICAN AMERICAN): >60 ML/MIN/1.73 M^2
GLUCOSE SERPL-MCNC: 99 MG/DL (ref 70–110)
HCT VFR BLD AUTO: 31.5 % (ref 40–54)
HGB BLD-MCNC: 10 G/DL (ref 14–18)
IMM GRANULOCYTES # BLD AUTO: 0.04 K/UL (ref 0–0.04)
IMM GRANULOCYTES NFR BLD AUTO: 0.2 % (ref 0–0.5)
LACTATE SERPL-SCNC: 0.8 MMOL/L (ref 0.5–2.2)
LYMPHOCYTES # BLD AUTO: 1.7 K/UL (ref 1–4.8)
LYMPHOCYTES NFR BLD: 10.2 % (ref 18–48)
MAGNESIUM SERPL-MCNC: 2 MG/DL (ref 1.6–2.6)
MCH RBC QN AUTO: 30.6 PG (ref 27–31)
MCHC RBC AUTO-ENTMCNC: 31.7 G/DL (ref 32–36)
MCV RBC AUTO: 96 FL (ref 82–98)
MONOCYTES # BLD AUTO: 1.5 K/UL (ref 0.3–1)
MONOCYTES NFR BLD: 9 % (ref 4–15)
NEUTROPHILS # BLD AUTO: 13.6 K/UL (ref 1.8–7.7)
NEUTROPHILS NFR BLD: 80.3 % (ref 38–73)
NRBC BLD-RTO: 0 /100 WBC
PHOSPHATE SERPL-MCNC: 2.4 MG/DL (ref 2.7–4.5)
PLATELET # BLD AUTO: 535 K/UL (ref 150–350)
PMV BLD AUTO: 9.5 FL (ref 9.2–12.9)
POTASSIUM SERPL-SCNC: 3.1 MMOL/L (ref 3.5–5.1)
PROCALCITONIN SERPL IA-MCNC: 0.56 NG/ML
PROT SERPL-MCNC: 6.9 G/DL (ref 6–8.4)
RBC # BLD AUTO: 3.27 M/UL (ref 4.6–6.2)
SARS-COV-2 RDRP RESP QL NAA+PROBE: NEGATIVE
SODIUM SERPL-SCNC: 135 MMOL/L (ref 136–145)
WBC # BLD AUTO: 16.95 K/UL (ref 3.9–12.7)

## 2020-09-20 PROCEDURE — 96361 HYDRATE IV INFUSION ADD-ON: CPT

## 2020-09-20 PROCEDURE — 84100 ASSAY OF PHOSPHORUS: CPT

## 2020-09-20 PROCEDURE — 99223 PR INITIAL HOSPITAL CARE,LEVL III: ICD-10-PCS | Mod: ,,, | Performed by: HOSPITALIST

## 2020-09-20 PROCEDURE — 87040 BLOOD CULTURE FOR BACTERIA: CPT | Mod: 59

## 2020-09-20 PROCEDURE — 63600175 PHARM REV CODE 636 W HCPCS: Performed by: HOSPITALIST

## 2020-09-20 PROCEDURE — U0002 COVID-19 LAB TEST NON-CDC: HCPCS

## 2020-09-20 PROCEDURE — 80053 COMPREHEN METABOLIC PANEL: CPT

## 2020-09-20 PROCEDURE — 96360 HYDRATION IV INFUSION INIT: CPT | Mod: 59

## 2020-09-20 PROCEDURE — 83735 ASSAY OF MAGNESIUM: CPT

## 2020-09-20 PROCEDURE — 94761 N-INVAS EAR/PLS OXIMETRY MLT: CPT

## 2020-09-20 PROCEDURE — 93010 ELECTROCARDIOGRAM REPORT: CPT | Mod: ,,, | Performed by: INTERNAL MEDICINE

## 2020-09-20 PROCEDURE — 83605 ASSAY OF LACTIC ACID: CPT

## 2020-09-20 PROCEDURE — 25500020 PHARM REV CODE 255: Performed by: INTERNAL MEDICINE

## 2020-09-20 PROCEDURE — 25000003 PHARM REV CODE 250: Performed by: EMERGENCY MEDICINE

## 2020-09-20 PROCEDURE — 99285 PR EMERGENCY DEPT VISIT,LEVEL V: ICD-10-PCS | Mod: ,,, | Performed by: EMERGENCY MEDICINE

## 2020-09-20 PROCEDURE — 94668 MNPJ CHEST WALL SBSQ: CPT

## 2020-09-20 PROCEDURE — 93005 ELECTROCARDIOGRAM TRACING: CPT

## 2020-09-20 PROCEDURE — 96375 TX/PRO/DX INJ NEW DRUG ADDON: CPT

## 2020-09-20 PROCEDURE — 84145 PROCALCITONIN (PCT): CPT

## 2020-09-20 PROCEDURE — 86140 C-REACTIVE PROTEIN: CPT

## 2020-09-20 PROCEDURE — 20600001 HC STEP DOWN PRIVATE ROOM

## 2020-09-20 PROCEDURE — 85025 COMPLETE CBC W/AUTO DIFF WBC: CPT

## 2020-09-20 PROCEDURE — 99285 EMERGENCY DEPT VISIT HI MDM: CPT | Mod: ,,, | Performed by: EMERGENCY MEDICINE

## 2020-09-20 PROCEDURE — 93010 EKG 12-LEAD: ICD-10-PCS | Mod: ,,, | Performed by: INTERNAL MEDICINE

## 2020-09-20 PROCEDURE — 99285 EMERGENCY DEPT VISIT HI MDM: CPT | Mod: 25

## 2020-09-20 PROCEDURE — 96365 THER/PROPH/DIAG IV INF INIT: CPT

## 2020-09-20 PROCEDURE — 99223 1ST HOSP IP/OBS HIGH 75: CPT | Mod: ,,, | Performed by: HOSPITALIST

## 2020-09-20 PROCEDURE — 85652 RBC SED RATE AUTOMATED: CPT

## 2020-09-20 PROCEDURE — 99900035 HC TECH TIME PER 15 MIN (STAT)

## 2020-09-20 PROCEDURE — 63600175 PHARM REV CODE 636 W HCPCS: Performed by: EMERGENCY MEDICINE

## 2020-09-20 RX ORDER — SODIUM,POTASSIUM PHOSPHATES 280-250MG
1 POWDER IN PACKET (EA) ORAL ONCE
Status: DISCONTINUED | OUTPATIENT
Start: 2020-09-20 | End: 2020-09-22

## 2020-09-20 RX ORDER — ASCORBIC ACID 250 MG
500 TABLET ORAL DAILY
Status: DISCONTINUED | OUTPATIENT
Start: 2020-09-21 | End: 2020-10-09 | Stop reason: HOSPADM

## 2020-09-20 RX ORDER — SODIUM CHLORIDE 0.9 % (FLUSH) 0.9 %
5 SYRINGE (ML) INJECTION
Status: DISCONTINUED | OUTPATIENT
Start: 2020-09-20 | End: 2020-10-09 | Stop reason: HOSPADM

## 2020-09-20 RX ORDER — ENOXAPARIN SODIUM 100 MG/ML
30 INJECTION SUBCUTANEOUS EVERY 24 HOURS
Status: DISCONTINUED | OUTPATIENT
Start: 2020-09-20 | End: 2020-09-27

## 2020-09-20 RX ORDER — PHENOBARBITAL 20 MG/5ML
100 ELIXIR ORAL NIGHTLY
Status: DISCONTINUED | OUTPATIENT
Start: 2020-09-20 | End: 2020-10-09 | Stop reason: HOSPADM

## 2020-09-20 RX ORDER — ONDANSETRON 2 MG/ML
8 INJECTION INTRAMUSCULAR; INTRAVENOUS EVERY 8 HOURS PRN
Status: DISCONTINUED | OUTPATIENT
Start: 2020-09-20 | End: 2020-10-09 | Stop reason: HOSPADM

## 2020-09-20 RX ORDER — DIPHENHYDRAMINE HCL 12.5MG/5ML
12.5 ELIXIR ORAL NIGHTLY
Status: DISCONTINUED | OUTPATIENT
Start: 2020-09-20 | End: 2020-09-25

## 2020-09-20 RX ORDER — HEPARIN SODIUM 5000 [USP'U]/ML
5000 INJECTION, SOLUTION INTRAVENOUS; SUBCUTANEOUS EVERY 8 HOURS
Status: DISCONTINUED | OUTPATIENT
Start: 2020-09-20 | End: 2020-09-20

## 2020-09-20 RX ORDER — BACLOFEN 10 MG/1
10 TABLET ORAL 3 TIMES DAILY
Status: DISCONTINUED | OUTPATIENT
Start: 2020-09-20 | End: 2020-10-09 | Stop reason: HOSPADM

## 2020-09-20 RX ORDER — GABAPENTIN 250 MG/5ML
250 SOLUTION ORAL NIGHTLY
Status: DISCONTINUED | OUTPATIENT
Start: 2020-09-20 | End: 2020-10-09 | Stop reason: HOSPADM

## 2020-09-20 RX ORDER — ACETAMINOPHEN 325 MG/1
650 TABLET ORAL EVERY 4 HOURS PRN
Status: DISCONTINUED | OUTPATIENT
Start: 2020-09-20 | End: 2020-10-09 | Stop reason: HOSPADM

## 2020-09-20 RX ORDER — KETOROLAC TROMETHAMINE 30 MG/ML
15 INJECTION, SOLUTION INTRAMUSCULAR; INTRAVENOUS
Status: COMPLETED | OUTPATIENT
Start: 2020-09-20 | End: 2020-09-20

## 2020-09-20 RX ORDER — SODIUM CHLORIDE AND POTASSIUM CHLORIDE 150; 900 MG/100ML; MG/100ML
INJECTION, SOLUTION INTRAVENOUS CONTINUOUS
Status: DISPENSED | OUTPATIENT
Start: 2020-09-20 | End: 2020-09-21

## 2020-09-20 RX ORDER — LOPERAMIDE HCL 1MG/7.5ML
2 LIQUID (ML) ORAL 4 TIMES DAILY
Status: DISCONTINUED | OUTPATIENT
Start: 2020-09-20 | End: 2020-10-09 | Stop reason: HOSPADM

## 2020-09-20 RX ORDER — PROPRANOLOL HYDROCHLORIDE 10 MG/1
20 TABLET ORAL 3 TIMES DAILY
Status: DISCONTINUED | OUTPATIENT
Start: 2020-09-20 | End: 2020-10-09 | Stop reason: HOSPADM

## 2020-09-20 RX ORDER — METRONIDAZOLE 500 MG/100ML
500 INJECTION, SOLUTION INTRAVENOUS
Status: DISCONTINUED | OUTPATIENT
Start: 2020-09-20 | End: 2020-09-23

## 2020-09-20 RX ORDER — CEFEPIME HYDROCHLORIDE 2 G/1
2 INJECTION, POWDER, FOR SOLUTION INTRAVENOUS
Status: DISCONTINUED | OUTPATIENT
Start: 2020-09-20 | End: 2020-09-29

## 2020-09-20 RX ORDER — VANCOMYCIN HCL IN 5 % DEXTROSE 1G/250ML
1000 PLASTIC BAG, INJECTION (ML) INTRAVENOUS
Status: COMPLETED | OUTPATIENT
Start: 2020-09-20 | End: 2020-09-20

## 2020-09-20 RX ADMIN — POTASSIUM CHLORIDE AND SODIUM CHLORIDE: 900; 150 INJECTION, SOLUTION INTRAVENOUS at 11:09

## 2020-09-20 RX ADMIN — KETOROLAC TROMETHAMINE 15 MG: 30 INJECTION, SOLUTION INTRAMUSCULAR at 08:09

## 2020-09-20 RX ADMIN — VANCOMYCIN HYDROCHLORIDE 1000 MG: 1 INJECTION, POWDER, LYOPHILIZED, FOR SOLUTION INTRAVENOUS at 08:09

## 2020-09-20 RX ADMIN — IOHEXOL 75 ML: 350 INJECTION, SOLUTION INTRAVENOUS at 10:09

## 2020-09-20 RX ADMIN — SODIUM CHLORIDE 993 ML: 0.9 INJECTION, SOLUTION INTRAVENOUS at 07:09

## 2020-09-20 RX ADMIN — CEFEPIME 2 G: 2 INJECTION, POWDER, FOR SOLUTION INTRAVENOUS at 10:09

## 2020-09-20 NOTE — ED TRIAGE NOTES
Patient arrived via EMS for having increased temp at home that [facility] noted to be 103.  Patient was just released on 9/18

## 2020-09-20 NOTE — ED PROVIDER NOTES
Encounter Date: 9/20/2020       History     Chief Complaint   Patient presents with    Fever     Home health reported that pt spiked a fever 103.5 rectal this morning. Pt is currently receiving Zosyn through a midline and was discharged 48 hour ago.      24-year-old past medical history of cerebral palsy, seizures, hyponatremia and recent hospital stay after being treated in the ICU for septic shock 2/2 pneumonia and protocolitis.  As per home health patient has spiked a fever 103.5 rectally this a.m..  Currently seeming Zosyn through midline.  As per patient's grandmother at bedside denies any worsening of patient's cognition or respiratory distress.  Patient unable to give history.        Review of patient's allergies indicates:  No Known Allergies  Past Medical History:   Diagnosis Date    Acid reflux     Cerebral palsy     History of hip surgery     S/P percutaneous endoscopic gastrostomy (PEG) tube placement     Seizures      Past Surgical History:   Procedure Laterality Date    CHOLECYSTECTOMY      FLEXIBLE SIGMOIDOSCOPY N/A 9/9/2020    Procedure: SIGMOIDOSCOPY, FLEXIBLE;  Surgeon: America Goode MD;  Location: [address]);  Service: Endoscopy;  Laterality: N/A;    HIP SURGERY       Family History   Problem Relation Age of Onset    Cancer Maternal Grandfather      Social History     Tobacco Use    Smoking status: Never Smoker    Smokeless tobacco: Never Used   Substance Use Topics    Alcohol use: Never     Frequency: Never    Drug use: Not Currently     Review of Systems   Unable to perform ROS: Patient nonverbal       Physical Exam     Initial Vitals   BP Pulse Resp Temp SpO2   09/20/20 1802 09/20/20 1802 09/20/20 1802 09/20/20 1855 09/20/20 1802   111/75 (!) 112 (!) 24 (!) 101.2 °F (38.4 °C) 97 %      MAP       --                Physical Exam    Nursing note and vitals reviewed.  Constitutional: He appears well-developed and well-nourished. He is not diaphoretic. No distress.   HENT:    Head: Normocephalic and atraumatic.   Nose: Nose normal.   Eyes: Conjunctivae and EOM are normal. Pupils are equal, round, and reactive to light. No scleral icterus.   Neck: Normal range of motion. Neck supple.   Cardiovascular: Normal rate and regular rhythm. Exam reveals no gallop and no friction rub.    No murmur heard.  Tachycardic    Pulmonary/Chest: No respiratory distress. He has no wheezes. He has rhonchi. He has no rales.   Abdominal: Soft. Bowel sounds are normal. There is no abdominal tenderness. There is no rebound and no guarding.   PEG tube in place, no surrounding erythema    Musculoskeletal: Normal range of motion. No tenderness or edema.      Comments: Extremities held in contracture    Neurological: He is alert and oriented to person, place, and time. He has normal strength. No sensory deficit. GCS score is 15. GCS eye subscore is 4. GCS verbal subscore is 5. GCS motor subscore is 6.   Skin: Skin is warm and dry. No rash noted. No erythema. No pallor.   Stage IV decubitus ulcer   No purulent drainage    Psychiatric: He has a normal mood and affect. His behavior is normal. Judgment and thought content normal.         ED Course   Procedures  Labs Reviewed   CBC W/ AUTO DIFFERENTIAL - Abnormal; Notable for the following components:       Result Value    WBC 16.95 (*)     RBC 3.27 (*)     Hemoglobin 10.0 (*)     Hematocrit 31.5 (*)     Mean Corpuscular Hemoglobin Conc 31.7 (*)     RDW 15.3 (*)     Platelets 535 (*)     Gran # (ANC) 13.6 (*)     Mono # 1.5 (*)     Gran% 80.3 (*)     Lymph% 10.2 (*)     All other components within normal limits   COMPREHENSIVE METABOLIC PANEL - Abnormal; Notable for the following components:    Sodium 135 (*)     Potassium 3.1 (*)     CO2 22 (*)     Calcium 8.3 (*)     Albumin 2.2 (*)     All other components within normal limits   PROCALCITONIN - Abnormal; Notable for the following components:    Procalcitonin 0.56 (*)     All other components within normal limits     Narrative:     ADD ON CRP, PHOS AND MAGNESIUM PER DR KATHY WELDON/ORDER# [id number],   [id number] @ 21:21 9/20/2020  ADD ON SED RATE AND PROCALCITONIN PER DR KATHY WELDON/ORDER#   [id number] AND [id number] @ 21:12 9/20/2020   SEDIMENTATION RATE - Abnormal; Notable for the following components:    Sed Rate 90 (*)     All other components within normal limits    Narrative:     ADD ON SED RATE AND PROCALCITONIN PER DR KATHY WELDON/ORDER#   [id number] AND [id number] @ 21:12 9/20/2020   C-REACTIVE PROTEIN - Abnormal; Notable for the following components:    .5 (*)     All other components within normal limits    Narrative:     ADD ON CRP, PHOS AND MAGNESIUM PER DR KATHY WELDON/ORDER# [id number],   [id number] AND [id number] @ 21:21 9/20/2020  ADD ON SED RATE AND PROCALCITONIN PER DR KATHY WELDON/ORDER#   [id number] AND [id number] @ 21:12 9/20/2020   PHOSPHORUS - Abnormal; Notable for the following components:    Phosphorus 2.4 (*)     All other components within normal limits    Narrative:     ADD ON CRP, PHOS AND MAGNESIUM PER DR KATHY WELDON/ORDER# [id number],   [id number] AND [id number] @ 21:21 9/20/2020  ADD ON SED RATE AND PROCALCITONIN PER DR KATHY WELDON/ORDER#   [id number] AND [id number] @ 21:12 9/20/2020   LACTIC ACID, PLASMA   SARS-COV-2 RNA AMPLIFICATION, QUAL   SEDIMENTATION RATE   C-REACTIVE PROTEIN   PROCALCITONIN   MAGNESIUM   PHOSPHORUS   MAGNESIUM    Narrative:     ADD ON CRP, PHOS AND MAGNESIUM PER DR KATHY WELDON/ORDER# [id number],   [id number] AND [id number] @ 21:21 9/20/2020  ADD ON SED RATE AND PROCALCITONIN PER DR KATHY WELDON/ORDER#   [id number] AND [id number] @ 21:12 9/20/2020   URINALYSIS, REFLEX TO URINE CULTURE        ECG Results          EKG 12-lead (Final result)  Result time 09/21/20 11:49:30    Final result by Interface, Lab In [facility] (09/21/20 11:49:30)                 Narrative:    Test Reason : A41.9,    Vent. Rate : 112 BPM     Atrial Rate : 116 BPM     P-R Int : 000 ms          QRS  Dur : 066 ms      QT Int : 300 ms       P-R-T Axes : 000 047 007 degrees     QTc Int : 410 ms    Age and gender specific analysis  Sinus tachycardia with occasional Premature ventricular complexes  Nonspecific T wave abnormality  Abnormal ECG  When compared with ECG of 30-AUG-2020 07:00,    Nonspecific T wave abnormality has replaced inverted T waves in Inferior  leads  T wave inversion no longer evident in Anterior leads  Confirmed by DEVONTE STEARNS MD ([id number]) on 9/21/2020 11:49:21 AM    Referred By: [email or web address]   SELF           Confirmed By:DEVONTE STEARNS MD                            Imaging Results           CT Chest Abdomen Pelvis With Contrast (Final result)  Result time 09/20/20 23:10:28    Final result by Sheldon Esquivel MD (09/20/20 23:10:28)                 Impression:      New small volume pneumoperitoneum predominantly along the anterior aspect of the mid abdomen.  Findings could be secondary to perforated viscus or gastrostomy tube leak.  Suggest surgical evaluation as clinically appropriate.    Inflammatory changes and wall thickening involving the ascending and rectosigmoid colon suggestive of a nonspecific colitis.    Debris in the left mainstem and left lower lobe bronchus suggestive of retained secretions/mucus or aspiration.    Gaseous distention of bowel which could be related to ileus.    Chronic changes involving the pelvic bones.  Bilateral ischial tuberosity decubitus ulcers with overlying bandage material.  No drainable fluid collection.    This report was flagged in Epic as abnormal.    Critical finding was called to Rosalie Parikh MD by Sheldon Esquivel MD at 22:54 on 09/20/2020.      Electronically signed by: Sheldon Esquivel MD  Date:    09/20/2020  Time:    23:10             Narrative:    EXAMINATION:  CT CHEST ABDOMEN PELVIS WITH CONTRAST (XPD)    CLINICAL HISTORY:  Sepsis;Please include sacral decub;    TECHNIQUE:  Low dose axial images, sagittal and coronal reformations were obtained  from the thoracic inlet to the pubic symphysis following the IV administration of 100 mL of Omnipaque 350 .  Oral contrast was not given.    COMPARISON:  CT chest abdomen pelvis, 09/04/2020.    FINDINGS:  Chest:    Evaluation is limited by extensive streak artifact due to the patient's arms overlying the field of view.    Heart size is normal.  No large or central pulmonary embolus.  Evaluation for small pulmonary emboli limited by motion degradation and extensive artifact.    No large consolidation.  No pleural fluid.  No pneumothorax.  There are mild retained secretions in the left mainstem bronchus.  Partial opacification of the left lower lobe bronchus, but no significant airspace disease in the left lower lobe.    Mild patulous appearance of the esophagus without focal wall thickening.    No bulky lymphadenopathy identified in the chest.    Abdomen:    Evaluation is limited by extensive streak artifact due to the patient's arms overlying the field of view.    Liver is negative for acute finding.  Gallbladder appears surgically absent.  No intrahepatic biliary ductal dilatation.    Spleen, adrenals, and pancreas are negative for acute finding.    Kidneys enhance symmetrically.  No hydronephrosis.    Percutaneous gastrostomy tube is present.  There are no findings of small bowel obstruction.  There is diffuse wall thickening and mucosal edema involving the ascending colon.  Colon is moderately distended with air and liquid stool.  There is small volume of pneumoperitoneum, predominantly layering along the anterior aspect of the mid abdomen.  No portal venous gas.    No organized fluid collection.    No bulky lymphadenopathy.    Abdominal aorta is normal in caliber.  SMA and JAVIER are patent.    Pelvis:    Urinary bladder is distended but otherwise unremarkable.  There are mild inflammatory changes involving the rectosigmoid colon, though improved when compared with the prior study.  Mild presacral edema.  No  "significant pelvic free fluid.    Bones and soft tissues:    Evaluation of the pelvic soft tissues is limited by extensive artifact related to positioning of the patient's lower extremities.  Chronic dysmorphic and erosive changes involving the bilateral femora and acetabula, similar to prior CT mild diffuse body wall edema in the upper and mid abdomen.  Marked soft tissue edema involving the lower abdominal wall and hip soft tissues.  Bilateral ischial tuberosity pressure ulcers with overlying skin defects and bandage material.  No new focal area bony erosion.  No drainable fluid collection, allowing for artifact limitations.  Probable left-sided hydrocele, similar to prior CT dated 08/11/2020.                               X-Ray Chest AP Portable (Final result)  Result time 09/20/20 20:13:44    Final result by Sheldon Esquivel MD (09/20/20 20:13:44)                 Impression:      No acute cardiopulmonary finding identified on this single view.      Electronically signed by: Sheldon Esquivel MD  Date:    09/20/2020  Time:    20:13             Narrative:    EXAMINATION:  XR CHEST AP PORTABLE    CLINICAL HISTORY:  Provided history is "Sepsis;  ".    TECHNIQUE:  One view of the chest.    COMPARISON:  09/01/2020.    FINDINGS:  Cardiac wires overlie the chest.  Cardiomediastinal silhouette is not enlarged.  No focal consolidation.  No sizable pleural effusion.  No pneumothorax.  Mild gaseous distention of bowel in the upper abdomen.                                 Medical Decision Making:   History:   Old Medical Records: I decided to obtain old medical records.  Initial Assessment:   24-year-old past medical history cerebral palsy, seizures, recent hospital ICU stay intubated at the time for septic shock secondary to sepsis sent to ED for evaluation of fevers.  Temp 101.2°, tachypneic 24, pulse 110.  PE noted for on gross lung sounds.  Differential Diagnosis:   DX includes sepsis, NATALIE, pneumonia, COVID, " bacteremia,  Clinical Tests:   Lab Tests: Reviewed and Ordered  Radiological Study: Ordered and Reviewed  Medical Tests: Reviewed and Ordered  ED Management:  Plan:  Obtain CBC, CMP, blood cultures, patient currently obtaining infusion of Zosyn will give vanc, COVID tests and likely admission reassess.                   ED Course as of Sep 21 1845   Sun Sep 20, 2020   [address]     [DC]      ED Course User Index  [DC] Miroslava Urias Jr., MD            Clinical Impression:     ICD-10-CM ICD-9-CM   1. Sepsis  A41.9 038.9     995.91   2. Chest pain  R07.9 786.50   3. Sepsis, due to unspecified organism, unspecified whether acute organ dysfunction present  A41.9 038.9     995.91   4. Cerebral palsy, unspecified type  G80.9 343.9   5. Fever, unspecified fever cause  R50.9 780.60   6. Pneumonia due to Pseudomonas species, unspecified laterality, unspecified part of lung  J15.1 482.1   7. Pneumoperitoneum  K66.8 568.89   8. Severe protein-calorie malnutrition  E43 262   9. Colitis  K52.9 558.9   10. Decubitus ulcer of ischial area, left, stage IV  L89.324 707.05     707.24   11. Pressure injury of right ischium, stage 4  L89.314 707.05     707.24                          ED Disposition Condition    Admit                             Miroslava Urias Jr., MD  09/21/20 1646

## 2020-09-20 NOTE — DISCHARGE SUMMARY
DISCHARGE SUMMARY  Hospital Medicine    [place]: [facility]    Patient Name: Glen Moscoso  YOB: 1995    Admit Date: 8/26/2020    Discharge Date: 9/18/20  Discharge Attending Physician: Quiana Armenta MD    Principal Diagnoses:  Active Hospital Problems    Diagnosis  POA    *Pneumonia due to Pseudomonas aerginuosa and Serratia marcesens [J15.1]  Yes    Decubitus ulcer of ischial area, left, stage IV [L89.324]  Yes    Diarrhea [R19.7]  Yes    Acute blood loss anemia [D62]  No    Pressure injury of right ischium, stage 4 [L89.314]  Yes    Fever [R50.9]  No    Bacterial infection due to Serratia [A49.8]  Yes    Anemia of chronic disease [D63.8]  Yes    Severe protein-calorie malnutrition [E43]  Yes    Seizure disorder [G40.909]  Yes    Proctocolitis [K52.9]  Yes    Cerebral palsy [G80.9]  Yes      Resolved Hospital Problems    Diagnosis Date Resolved POA    Status epilepticus [G40.901] 09/10/2020 Yes    Biphasic stridor [R06.1] 09/10/2020 No    Acute respiratory failure with hypoxia [J96.01] 09/10/2020 Yes    Hypernatremia [E87.0] 09/10/2020 No    Hypophosphatemia [E83.39] 09/10/2020 Yes    Pneumonia [J18.9] 08/30/2020 Yes    Endotracheally intubated [Z97.8] 08/27/2020 Unknown    Respiratory distress [R06.03] 08/27/2020 Unknown    Encephalopathy acute [G93.40] 09/10/2020 Yes    Deep tissue injury [T14.8XXA] 09/11/2020 Yes    Status epilepticus [G40.901] 08/30/2020 Yes    Decubitus ulcer of left buttock, stage 2 [L89.322] 09/10/2020 Yes    Hypokalemia [E87.6] 09/10/2020 Yes       Discharged Condition: improved     Interval history: afebrile, LE US neg, all workup in last day no new source, on zosyn for aspiration and for sacrul ulcers until 9/24 has ID follow up. CM assisted in HH, discussed plans on phone with mom twice, she came for teaching from her appt in clinic from infusion, and got bedside wound dressing advise also but hector have HH help with this also. ambulane  transport to [facility] now.    Temp:  [97.8 °F (36.6 °C)-100.5 °F (38.1 °C)]   Pulse:  []   Resp:  [14-18]   BP: (103-127)/(62-82)   SpO2:  [94 %-98 %]      Physical Exam:  Constitutional: Appears underweight, chronically ill baseline. Nonverbal. Contractures.  Head: Normocephalic and atraumatic.   Mouth/Throat: Oropharynx is clear and moist.   Eyes: EOM are normal. Pupils are equal, round, and reactive to light. No scleral icterus.   Neck: Normal range of motion. Neck supple. some contracture in neck.  Cardiovascular: Normal rate and regular rhythm.  No murmur heard.  Pulmonary/Chest: Effort normal and breath sounds normal. Coarse breath sounds. On room air.   Abdominal: Soft. Bowel sounds are normal.  No distension or tenderness. G tube.rizvi in place. PEG no redness nor pain  Musculoskeletal: muscle wasting. Contractures of both legs. No edema.  Neurological: not alert to place or person, baseline status. No gross abnormalities on minimal neuro exam- moving all 4 extremities. Cannot assess cranial nerves or sensation. bedbound baseline. Contractures.  Skin: Skin is warm and dry. sacral ulcerations with full thickness breakdown seen with black nonviable tissue (See wound care pictures). Keloid type changes on bilatearl hips. Peripheral IV in left arm.  Psychiatric: Normal mood and affect. Behavior is normal.  HOSPITAL COURSE:      Initial Presentation:    Glen Moscoso is a 23 yo M with a PMH of CP, Seizures, and recent admission for hyponatremia who presents to [facility] for encephalopathy, sepsis, diarrhea, and possible seizure. He presented to OSH after being found unresponsive and in  Resp distress. He was found down by his mother upon EMS arrival he had agonal resp 3-4 per min, he was intubated by EMS and take to OSH ED. He was hypotensive on arrival started on Levo gtt, received antibiotics, ABG showed metabolic acidosis. He is being admitted to [facility] for a higher level of care, EGG and close monitoring.     Course of  Principle Problem for Admission:    Acute respiratory failure with hypoxia  Serratia/Pseudomonas Pneumonia--> with reoccurrent Resistant pneumonia     · Patient intubated upon transfer from outside facility and extubated 8/27  ·  Patient found to have pneumonia and felt cause of respiratory failure, treated with course of cipro, stepped back up to ICU 8/31 for worse resp fx and stridor, improved with chin thrust manuevers but still required extra stay, requires frequent suctioning for this  · Sputum Cx growing serratia again on 8/31 while on treatment with cipro, still febrile every day to 101 multiple times, sputum less per mom and CXR improved but still has episodes likely aspiration with increased HR, and fever, on RA with sats 92-98% now. Sensitivities returned on 9/3 with Cipro resistant now, zosyn started, ID following, recommend to continue, chest CT with R GOO likely the pna, no clots. Has been on RA since SD.  · Recheck sputum 9/6 for fever, with same serratia and sensitiivty. Per ID rec  Now 3 weeks antibiotics now (9/24). Had PICC placed 9/14 that apparently diappared at some point as was missing on disharge. thanksfully picc team placed a stat one for him on day of discharge in left arm. For home infusions.     Other Medical Problems Addressed in the Hospital:    Biphasic stridor  · Patient with acute development of stridor after extubation of 8/27 but acutely worsened on 8/30.   · Likely mechanical due to neck muscle weakness related to his advance cerebral palsy as improved when patient was sat up and chin was pulled up) and recent acute illnesses but also likely a degree of airway inflammation as contributing to upper air obstruction.   · Patient should be at 90 degree angle at all times to help with breathing mechanics and will monitor.  · Stepped back to ICU 8/31 for this, requires frequent suctioning, jaw thrust improves this, mom reports improving now on step down 9/3, continue nebs and supportive  care  · No issues since     Pneumonia due to Pseudomonas aerginuosa and Serratia marcesens  · Patient admitted with acute hypoxic respiratory failure and sepsis. Blood and urine cultures negative and C. Diff negative but respiratory culture grew Serratia and Pseudomonas. Procal improved since initiation of antibiotics and WBC normalized and sepsis resolved (procal 4--. 3)  -see above for acute resp failure, ID consulted 9/3 for 8/31 serratia with FQ resistance now, Zosyn started. Continue now.   -1 week at least per ID initially but given persistance now rec 2 weeks (9/24)        Encephalopathy acute  · Improved, at baseline on step down 9/3 per mom, non verbal at baseline but interacts with his mom somewhat  · Patient with decreased mentation from baseline on admit. Patient transferred to [facility] ICU for further evaluation. EEG done and showed no status epilepticus and encephalopathy felt related to sepsis/infection.   · No issues since then     Decubitus ulcer of left buttock, left ischial area, right ischial area, stage 4     Wounds present on admit and wound care, worsened since admission from stage 2 to 4 with picture documentation from wound care- mom reports had very bad stool issues a month ago causing these worsening wounds prior to admit  -nonviable tissue to sacrum (See pictures)  -gen surg consultd 9/3 for debridmenet, consulted, feel tissue does not need debridement now on exam but likely were looking at area that wound care was not concerned about as wound care called 9/4 and stated this, will talk to them again after CT results below.  -CT abdomen/pelvis not showing bone involved, debrided with gen surg 9/7, right side with underlying abscess cavity tracking inferior and laterally about 2 cm, packed with wet to dry gauze dressings, BID drsesing changes needed  Continue to Turn every 2 hrs. Pad bony prominences with pillows or foam dressings.  -zosyn will cover the abscess cavity per ID as no CX  taken, rec zosyn until 9/24. Has midline left arm. Low air loss matress for home with hospital bed. Has rectal tube in place to prevent stool in currently.  -updated home WC recs 9/17, improving per Twan, no need for debridment he can see or worsening wounds to be fever source based on their exam, he knows patient well on prev weeks  -mom given bedside basic instructinos on dc day as was admitted herself for a wee prior to dc (always at bedside on all other days during admit) to give basic instructios in case dressing fall off between HH changes.           Seizure disorder  Chronic and controlled. EEG done on this admit showed no status epilepticus.   -Continue Phenobarbital 100 mg nightly via G tube to treat as patient takes at home         Anemia of chronic disease  Acute blood loss anemia  Chronic and controlled. Monitor with daily CBC and transfuse if Hgb < 7.   -Hg between 7-8 now but has has had slow downtick to lower 7s now.  9/8: dipped again to 6.7 from 7.8 the last few days with FOBT + in setting of imaging for colitis. 1 U PRBCS. Hold heparin, protonix PO as not suspected upper GI bleed.  9/9: now up at 10, overcorrected, so may expect downtick tomorrow again   -has been stable since     Hypokalemia  Hypophosphatemia  -replace PRN                 Severe protein-calorie malnutrition  · Patient with obvious muscle wasting and BMI of 15.8. Nutrition consulted on admit.   · Patient started on TF with Peptamen 1.5 with prebio at 40 cc/hr.   · Nutrition recommended adding Zinc, MVI and Vitamin C to help with wound healing. MVI should contain enough Zinc for wound healing and added Vit C 500 mg via PEG with MVI once daily via PEG on 9/15. Zinc only comes in capsules so cannot give to patient as has a PEG tube and strict NPO.        Cerebral palsy  Patient with poor functional baseline and bed bound with extremity contractures and total care at home.     Line associated UE clot  -line removed after Left basilic  thrombus found in fever work up. RIght cephalic not well visualized, cannot rule out  -as line associated can consider anticoag however is superficial vein, so will do ppx only now while in hospital given recent anemia issues and at home cnotinu preventative measures,      Fever  -on 9/16 x 2. Concern could be coming from ischial ulcers, no worsening resp status  - check LE U/S given sedentary status for DVT  -on zosyn now, blood Cx 9/16 NGTD  -UA without signs of infection   CRP 98-->90. procal neg  -resolved, wbc better without any changes 24 hours prior to dc so no source found with large wrk up              Diarrhea  Protocolitis  -C dff negative, stool studies negative  -tube feeds could be contributor but have changed since home and diarrhea has persisted since then, adjusted here since admission.  -seen in CT early august and again 9/4  · GI consulted and performed flex sig on 9/9 to evaluate and showed no gross abnormalities, biopsies taken. Repeat stool studies on this admit negative for infectious etiology. Pathology from colon biopsies showed mild coxitis but no signs of inflammatory bowel disease and negative for CMV. Appearance of biopsy consistent with drug/toxin or infectious etiology. Patient had an infectious work-up and was negative. No obvious drug or toxin cause. Improved with Imodium.   As per GI, give trial of scheduled Imodium to see if helps so will try Imodium 2 mg po 4 times daily. If that fails then could consider trial of cholestyramine.      Sleep Disturbance  -resumed home bendadryl and neurontin qhs            Consults: icu ID surgery GI    Last CBC/BMP:    CBC/Anemia Labs: Coags:    Recent Labs   Lab 09/16/20  0606 09/17/20  0601 09/18/20  0445   WBC 11.91 9.58 8.22   HGB 10.5* 9.3* 9.0*   HCT 34.2* 29.7* 29.3*   * 729* 657*   MCV 98 98 98   RDW 16.2* 15.8* 15.8*    No results for input(s): PT, INR, APTT in the last 168 hours.     Chemistries:   Recent Labs   Lab 09/14/20  0848  09/16/20  0606 09/18/20  0445    138 137   K 3.9 4.3 3.5    102 102   CO2 26 24 24   BUN 7 8 6   CREATININE 0.5 0.6 0.5   CALCIUM 8.5* 9.4 8.7   PHOS 2.9  --   --               Special Treatments/Procedures:   Procedure(s) (LRB):  SIGMOIDOSCOPY, FLEXIBLE (N/A)     Disposition: Home-Health Care [facility]      Future Scheduled Appointments:  Future Appointments   Date Time Provider Department Center   9/24/2020  2:00 PM Miguel Angel Chun MD [facility] ID [address]         Discharge Medication List:       Danis Glen M   Home Medication Instructions OFELIA:[id number]    Printed on:09/19/20 2212   Medication Information                      ascorbic acid, vitamin C, (VITAMIN C) 500 MG tablet  1 tablet (500 mg total) by Per G Tube route once daily.             baclofen (LIORESAL) 10 MG tablet  1 tablet (10 mg total) by Per G Tube route 3 (three) times daily.             cholestyramine (QUESTRAN) 4 gram packet  Take 1 packet (4 g total) by mouth 2 (two) times daily.             diphenhydrAMINE (BENADRYL) 12.5 mg/5 mL elixir  10 mLs (25 mg total) by Per G Tube route 4 (four) times daily as needed for Allergies.             gabapentin (NEURONTIN) 250 mg/5 mL solution  5 mLs (250 mg total) by Per G Tube route nightly. At bedtime             ibuprofen (ADVIL,MOTRIN) 100 mg/5 mL suspension  10 mLs (200 mg total) by Per G Tube route every 6 (six) hours as needed for Pain or Temperature greater than.             Lactobacillus rhamnosus GG (CULTURELLE) 10 billion cell capsule  1 capsule by Per G Tube route once daily.             loperamide (IMODIUM) 1 mg/7.5 mL solution  15 mLs (2 mg total) by Per G Tube route 4 (four) times daily. for 10 days             multivitamin liquid no.118 Liqd  10 mLs by Per G Tube route once daily.             nystatin (MYCOSTATIN) cream  Apply topically 2 (two) times daily.             PHENobarbitaL 20 mg/5 mL (4 mg/mL) Elix elixir  Take 25 mLs (100 mg total) by mouth every evening.            "  piperacillin sodium/tazobactam (PIPERACILLIN-TAZOBACTAM 4.5G/100ML SODIUM CHLORIDE 0.9%-READY TO MIX)  Inject 300 mLs (13.5 g total) into the vein continuous. End date 9/24/2020. for 9 days             propranoloL (INDERAL) 20 MG tablet  1 tablet (20 mg total) by Per G Tube route 3 (three) times daily.                 Patient Instructions:  Discharge Procedure Orders   HOSPITAL BED FOR HOME USE     Order Specific Question Answer Comments   Type: Semi-electric    Length of need (1-99 months): 99    Does patient have medical equipment at home? wheelchair    Does patient have medical equipment at home? hospital bed    Does patient have medical equipment at home? nebulizer    Height: 4' 9" (1.448 m)    Weight: 33.1 kg (73 lb)    Accessories: Low air loss mattress    Please check all that apply: Patient requires positioning of the body in ways not feasible in an ordinary bed due to a medical condition which is expected to last at least one month.    Please check all that apply: Patient requires, for the alleviation of pain, positioning of the body in ways not feasible in an ordinary bed.    Please check all that apply: Patient requires frequent changes in body position and/or has an immediate need for a change in body position.    Vendor: Other (use comments) [facility]   Expected Date of Delivery: 9/14/2020        At the time of discharge patient was told to take all medications as prescribed, to keep all followup appointments, and to call their primary care physician or return to the emergency room if they have any worsening or concerning symptoms.    Signing Physician:  Quiana Armenta MD  "

## 2020-09-21 PROBLEM — K66.8 PNEUMOPERITONEUM: Status: ACTIVE | Noted: 2020-09-21

## 2020-09-21 LAB
ALBUMIN SERPL BCP-MCNC: 1.9 G/DL (ref 3.5–5.2)
ALP SERPL-CCNC: 96 U/L (ref 55–135)
ALT SERPL W/O P-5'-P-CCNC: 20 U/L (ref 10–44)
ANION GAP SERPL CALC-SCNC: 11 MMOL/L (ref 8–16)
AST SERPL-CCNC: 20 U/L (ref 10–40)
BACTERIA BLD CULT: NORMAL
BASOPHILS # BLD AUTO: 0.02 K/UL (ref 0–0.2)
BASOPHILS # BLD AUTO: 0.02 K/UL (ref 0–0.2)
BASOPHILS NFR BLD: 0.2 % (ref 0–1.9)
BASOPHILS NFR BLD: 0.2 % (ref 0–1.9)
BILIRUB SERPL-MCNC: 0.1 MG/DL (ref 0.1–1)
BUN SERPL-MCNC: 6 MG/DL (ref 6–20)
CALCIUM SERPL-MCNC: 8.2 MG/DL (ref 8.7–10.5)
CHLORIDE SERPL-SCNC: 105 MMOL/L (ref 95–110)
CO2 SERPL-SCNC: 20 MMOL/L (ref 23–29)
CREAT SERPL-MCNC: 0.5 MG/DL (ref 0.5–1.4)
DIFFERENTIAL METHOD: ABNORMAL
DIFFERENTIAL METHOD: ABNORMAL
EOSINOPHIL # BLD AUTO: 0 K/UL (ref 0–0.5)
EOSINOPHIL # BLD AUTO: 0 K/UL (ref 0–0.5)
EOSINOPHIL NFR BLD: 0 % (ref 0–8)
EOSINOPHIL NFR BLD: 0.2 % (ref 0–8)
ERYTHROCYTE [DISTWIDTH] IN BLOOD BY AUTOMATED COUNT: 15.2 % (ref 11.5–14.5)
ERYTHROCYTE [DISTWIDTH] IN BLOOD BY AUTOMATED COUNT: 15.4 % (ref 11.5–14.5)
EST. GFR  (AFRICAN AMERICAN): >60 ML/MIN/1.73 M^2
EST. GFR  (NON AFRICAN AMERICAN): >60 ML/MIN/1.73 M^2
GLUCOSE SERPL-MCNC: 88 MG/DL (ref 70–110)
HCT VFR BLD AUTO: 29.1 % (ref 40–54)
HCT VFR BLD AUTO: 30 % (ref 40–54)
HGB BLD-MCNC: 9 G/DL (ref 14–18)
HGB BLD-MCNC: 9.2 G/DL (ref 14–18)
IMM GRANULOCYTES # BLD AUTO: 0.04 K/UL (ref 0–0.04)
IMM GRANULOCYTES # BLD AUTO: 0.04 K/UL (ref 0–0.04)
IMM GRANULOCYTES NFR BLD AUTO: 0.3 % (ref 0–0.5)
IMM GRANULOCYTES NFR BLD AUTO: 0.4 % (ref 0–0.5)
LACTATE SERPL-SCNC: 0.6 MMOL/L (ref 0.5–2.2)
LACTATE SERPL-SCNC: 0.9 MMOL/L (ref 0.5–2.2)
LYMPHOCYTES # BLD AUTO: 1.5 K/UL (ref 1–4.8)
LYMPHOCYTES # BLD AUTO: 1.8 K/UL (ref 1–4.8)
LYMPHOCYTES NFR BLD: 12.6 % (ref 18–48)
LYMPHOCYTES NFR BLD: 16 % (ref 18–48)
MAGNESIUM SERPL-MCNC: 1.8 MG/DL (ref 1.6–2.6)
MCH RBC QN AUTO: 30 PG (ref 27–31)
MCH RBC QN AUTO: 30.3 PG (ref 27–31)
MCHC RBC AUTO-ENTMCNC: 30.7 G/DL (ref 32–36)
MCHC RBC AUTO-ENTMCNC: 30.9 G/DL (ref 32–36)
MCV RBC AUTO: 97 FL (ref 82–98)
MCV RBC AUTO: 99 FL (ref 82–98)
MONOCYTES # BLD AUTO: 1.2 K/UL (ref 0.3–1)
MONOCYTES # BLD AUTO: 1.3 K/UL (ref 0.3–1)
MONOCYTES NFR BLD: 10.3 % (ref 4–15)
MONOCYTES NFR BLD: 11.8 % (ref 4–15)
NEUTROPHILS # BLD AUTO: 8 K/UL (ref 1.8–7.7)
NEUTROPHILS # BLD AUTO: 9 K/UL (ref 1.8–7.7)
NEUTROPHILS NFR BLD: 71.4 % (ref 38–73)
NEUTROPHILS NFR BLD: 76.6 % (ref 38–73)
NRBC BLD-RTO: 0 /100 WBC
NRBC BLD-RTO: 0 /100 WBC
PHOSPHATE SERPL-MCNC: 2.7 MG/DL (ref 2.7–4.5)
PLATELET # BLD AUTO: 472 K/UL (ref 150–350)
PLATELET # BLD AUTO: 521 K/UL (ref 150–350)
PMV BLD AUTO: 9.3 FL (ref 9.2–12.9)
PMV BLD AUTO: 9.5 FL (ref 9.2–12.9)
POTASSIUM SERPL-SCNC: 3.3 MMOL/L (ref 3.5–5.1)
PROT SERPL-MCNC: 6.3 G/DL (ref 6–8.4)
RBC # BLD AUTO: 3 M/UL (ref 4.6–6.2)
RBC # BLD AUTO: 3.04 M/UL (ref 4.6–6.2)
SODIUM SERPL-SCNC: 136 MMOL/L (ref 136–145)
WBC # BLD AUTO: 11.12 K/UL (ref 3.9–12.7)
WBC # BLD AUTO: 11.77 K/UL (ref 3.9–12.7)

## 2020-09-21 PROCEDURE — 99900035 HC TECH TIME PER 15 MIN (STAT)

## 2020-09-21 PROCEDURE — 36415 COLL VENOUS BLD VENIPUNCTURE: CPT

## 2020-09-21 PROCEDURE — 63600175 PHARM REV CODE 636 W HCPCS: Performed by: INTERNAL MEDICINE

## 2020-09-21 PROCEDURE — 25500020 PHARM REV CODE 255: Performed by: INTERNAL MEDICINE

## 2020-09-21 PROCEDURE — 84100 ASSAY OF PHOSPHORUS: CPT

## 2020-09-21 PROCEDURE — 83605 ASSAY OF LACTIC ACID: CPT

## 2020-09-21 PROCEDURE — 83605 ASSAY OF LACTIC ACID: CPT | Mod: 91

## 2020-09-21 PROCEDURE — 85025 COMPLETE CBC W/AUTO DIFF WBC: CPT

## 2020-09-21 PROCEDURE — 99233 PR SUBSEQUENT HOSPITAL CARE,LEVL III: ICD-10-PCS | Mod: ,,, | Performed by: PHYSICIAN ASSISTANT

## 2020-09-21 PROCEDURE — 94668 MNPJ CHEST WALL SBSQ: CPT

## 2020-09-21 PROCEDURE — S0030 INJECTION, METRONIDAZOLE: HCPCS | Performed by: HOSPITALIST

## 2020-09-21 PROCEDURE — 20600001 HC STEP DOWN PRIVATE ROOM

## 2020-09-21 PROCEDURE — 31720 CLEARANCE OF AIRWAYS: CPT

## 2020-09-21 PROCEDURE — 83735 ASSAY OF MAGNESIUM: CPT

## 2020-09-21 PROCEDURE — 99233 SBSQ HOSP IP/OBS HIGH 50: CPT | Mod: ,,, | Performed by: PHYSICIAN ASSISTANT

## 2020-09-21 PROCEDURE — 99232 SBSQ HOSP IP/OBS MODERATE 35: CPT | Mod: ,,, | Performed by: SURGERY

## 2020-09-21 PROCEDURE — 99232 PR SUBSEQUENT HOSPITAL CARE,LEVL II: ICD-10-PCS | Mod: ,,, | Performed by: SURGERY

## 2020-09-21 PROCEDURE — 94761 N-INVAS EAR/PLS OXIMETRY MLT: CPT

## 2020-09-21 PROCEDURE — 63600175 PHARM REV CODE 636 W HCPCS: Performed by: HOSPITALIST

## 2020-09-21 PROCEDURE — 25000003 PHARM REV CODE 250: Performed by: HOSPITALIST

## 2020-09-21 PROCEDURE — 99233 PR SUBSEQUENT HOSPITAL CARE,LEVL III: ICD-10-PCS | Mod: ,,, | Performed by: INTERNAL MEDICINE

## 2020-09-21 PROCEDURE — 99233 SBSQ HOSP IP/OBS HIGH 50: CPT | Mod: ,,, | Performed by: INTERNAL MEDICINE

## 2020-09-21 PROCEDURE — 80053 COMPREHEN METABOLIC PANEL: CPT

## 2020-09-21 RX ORDER — IBUPROFEN 200 MG
16 TABLET ORAL
Status: DISCONTINUED | OUTPATIENT
Start: 2020-09-21 | End: 2020-10-09 | Stop reason: HOSPADM

## 2020-09-21 RX ORDER — TALC
6 POWDER (GRAM) TOPICAL NIGHTLY PRN
Status: DISCONTINUED | OUTPATIENT
Start: 2020-09-21 | End: 2020-10-09 | Stop reason: HOSPADM

## 2020-09-21 RX ORDER — GLUCAGON 1 MG
1 KIT INJECTION
Status: DISCONTINUED | OUTPATIENT
Start: 2020-09-21 | End: 2020-10-09 | Stop reason: HOSPADM

## 2020-09-21 RX ORDER — IPRATROPIUM BROMIDE AND ALBUTEROL SULFATE 2.5; .5 MG/3ML; MG/3ML
3 SOLUTION RESPIRATORY (INHALATION) EVERY 4 HOURS PRN
Status: DISCONTINUED | OUTPATIENT
Start: 2020-09-21 | End: 2020-10-09 | Stop reason: HOSPADM

## 2020-09-21 RX ORDER — BISACODYL 10 MG
10 SUPPOSITORY, RECTAL RECTAL DAILY PRN
Status: DISCONTINUED | OUTPATIENT
Start: 2020-09-21 | End: 2020-10-09 | Stop reason: HOSPADM

## 2020-09-21 RX ORDER — IBUPROFEN 200 MG
24 TABLET ORAL
Status: DISCONTINUED | OUTPATIENT
Start: 2020-09-21 | End: 2020-10-09 | Stop reason: HOSPADM

## 2020-09-21 RX ADMIN — DIPHENHYDRAMINE HYDROCHLORIDE 12.5 MG: 25 SOLUTION ORAL at 10:09

## 2020-09-21 RX ADMIN — VANCOMYCIN HYDROCHLORIDE 750 MG: 750 INJECTION, POWDER, LYOPHILIZED, FOR SOLUTION INTRAVENOUS at 10:09

## 2020-09-21 RX ADMIN — VANCOMYCIN HYDROCHLORIDE 750 MG: 750 INJECTION, POWDER, LYOPHILIZED, FOR SOLUTION INTRAVENOUS at 08:09

## 2020-09-21 RX ADMIN — PROPRANOLOL HYDROCHLORIDE 20 MG: 20 TABLET ORAL at 10:09

## 2020-09-21 RX ADMIN — PHENOBARBITAL 100 MG: 20 ELIXIR ORAL at 09:09

## 2020-09-21 RX ADMIN — ENOXAPARIN SODIUM 30 MG: 30 INJECTION SUBCUTANEOUS at 05:09

## 2020-09-21 RX ADMIN — DIATRIZOATE MEGLUMINE AND DIATRIZOATE SODIUM 10 ML: 660; 100 LIQUID ORAL; RECTAL at 10:09

## 2020-09-21 RX ADMIN — PROPRANOLOL HYDROCHLORIDE 20 MG: 20 TABLET ORAL at 03:09

## 2020-09-21 RX ADMIN — CEFEPIME 2 G: 2 INJECTION, POWDER, FOR SOLUTION INTRAVENOUS at 10:09

## 2020-09-21 RX ADMIN — LOPERAMIDE HYDROCHLORIDE 2 MG: 1 SOLUTION ORAL at 03:09

## 2020-09-21 RX ADMIN — METRONIDAZOLE 500 MG: 500 INJECTION, SOLUTION INTRAVENOUS at 05:09

## 2020-09-21 RX ADMIN — CEFEPIME 2 G: 2 INJECTION, POWDER, FOR SOLUTION INTRAVENOUS at 09:09

## 2020-09-21 RX ADMIN — METRONIDAZOLE 500 MG: 500 INJECTION, SOLUTION INTRAVENOUS at 01:09

## 2020-09-21 RX ADMIN — GABAPENTIN 250 MG: 250 SOLUTION ORAL at 10:09

## 2020-09-21 RX ADMIN — LOPERAMIDE HYDROCHLORIDE 2 MG: 1 SOLUTION ORAL at 10:09

## 2020-09-21 RX ADMIN — ACETAMINOPHEN 650 MG: 325 TABLET ORAL at 05:09

## 2020-09-21 RX ADMIN — POTASSIUM CHLORIDE AND SODIUM CHLORIDE: 900; 150 INJECTION, SOLUTION INTRAVENOUS at 03:09

## 2020-09-21 RX ADMIN — METRONIDAZOLE 500 MG: 500 INJECTION, SOLUTION INTRAVENOUS at 08:09

## 2020-09-21 RX ADMIN — BACLOFEN 10 MG: 10 TABLET ORAL at 03:09

## 2020-09-21 RX ADMIN — ENOXAPARIN SODIUM 30 MG: 30 INJECTION SUBCUTANEOUS at 01:09

## 2020-09-21 RX ADMIN — BACLOFEN 10 MG: 10 TABLET ORAL at 10:09

## 2020-09-21 NOTE — PLAN OF CARE
09/21/20 1418   Post-Acute Status   Post-Acute Authorization Home Health   Home Health Status Awaiting Internal Medical Clearance   Discharge Delays None known at this time      normal...

## 2020-09-21 NOTE — ED NOTES
Drainage noted coming from patient's harsha button, MD notified. Family states pt usually needs it changed when it starts draining.

## 2020-09-21 NOTE — PLAN OF CARE
Yadi Lawson MD   [address] / [place] [address]       [facility] - [place], [place] - [address]  [address]  [place] [address]  Phone: [phone] Fax: [phone]     Payor: MEDICAID / Plan: MEDICAID OF [place] / Product Type: [facility] /           09/21/20 1413   Discharge Assessment   Assessment Type Discharge Planning Assessment   Assessment information obtained from? Medical Record   Prior to hospitilization cognitive status: Unable to Assess  (Patient is non-verbal.)   Prior to hospitalization functional status: Completely Dependent   Current cognitive status: Unable to Assess  (Patient is non-verbal.)   Current Functional Status: Completely Dependent   Lives With parent(s)   Able to Return to Prior Arrangements yes   Is patient able to care for self after discharge? No   Who are your caregiver(s) and their phone number(s)? Leni Moscoso (mother) [phone]   Readmission Within the Last 30 Days current reason for admission unrelated to previous admission   If yes, most recent facility name: Recently discharged from [facility] on 9/18.   Patient currently being followed by outpatient case management? No   Patient currently receives any other outside agency services? Yes   Name and contact number of agency or person providing outside services Patient has PCA services through [facility]   Is it the patient/care giver preference to resume care with the current outside agency? Yes   Equipment Currently Used at Home wheelchair;hospital bed;nebulizer   Does the patient receive services at the Coumadin Clinic? No   Discharge Plan A Home Health  (current with [facility])   Discharge Plan B Home Health   DME Needed Upon Discharge  none

## 2020-09-21 NOTE — ASSESSMENT & PLAN NOTE
- 25 yo male with CP with recent admit for respiratory failure and pseudomonas/serratia PNA, Ischial ulcers, chronic diarrhea s/p flex sig on last admit without significant findings completing 14d of zosyn for PNA and soft tissue wounds readmitted with fever.  - on Vanc cefepime and flagyl  - source likely from intestinal perforation - ? Secondary to flex sig? - gen sx monitoring without intervention    - blood cultures NGTD  - WBC and fever normalized - stable non septic    Plan:  1. Continue current abx  2. Monitor for worsening and if patient deteriorates, rec repeat CT ABD  3. FU wound care recs and aggressive wound care - rec gen sx eval for need for further debridement  4. Will follow  5. Discussed with primary team

## 2020-09-21 NOTE — CONSULTS
[facility]  Infectious Disease  Consult Note    Patient Name: Glen Moscoso  MRN: [id number]  Admission Date: 9/20/2020  Hospital Length of Stay: 1 days  Attending Physician: Christiano Rainey MD  Primary Care Provider: Yadi Lawson MD     Isolation Status: No active isolations    Patient information was obtained from ER records.      Consults  Assessment/Plan:     Fever  - 23 yo male with CP with recent admit for respiratory failure and pseudomonas/serratia PNA, Ischial ulcers, chronic diarrhea s/p flex sig on last admit without significant findings completing 14d of zosyn for PNA and soft tissue wounds readmitted with fever.  - on Vanc cefepime and flagyl  - source likely from intestinal perforation - ? Secondary to flex sig? - gen sx monitoring without intervention    - blood cultures NGTD  - WBC and fever normalized - stable non septic    Plan:  1. Continue current abx  2. Monitor for worsening and if patient deteriorates, rec repeat CT ABD  3. FU wound care recs and aggressive wound care - rec gen sx eval for need for further debridement  4. Will follow  5. Discussed with primary team        Thank you for your consult. I will follow-up with patient. Please contact us if you have any additional questions.    JO Shin  Infectious Disease  [facility]    Subjective:     Principal Problem: Sepsis    HPI: Mr. Glen Moscoso is a 24 y.o. male with CP, complicated by seizures and a peg tube, who presents to the ER for evaluation of fever.  He just had a prolonged and complex hospital stay at [facility] from 8/26-9/18 for seizures and pneumonia.  He was originally admitted to [facility] with respiratory failure, where he was found to have Pseudomonas and Serratia pneumonia, as well as a decubitus ulcer that was debrided by Gen Surg on 9/7 - cultures were not sent.  He had repeat resp cultures that showed Serratia.  He had had a CT ABD/plevis during that visit that suggested  proctocolitis but the visualized osseous structures appear stable with chronic appearing deformity of the bilateral femora and acetabula.  Skeletal structures otherwise intact without evidence of erosive change. He was followed by ID.  He was discharged home 9/18 on Zosyn via LUE PICC until 9/24 (to complete 2 weeks).      Family at bedside on admission reported he was doing well until the day of admission when he started to have fevers up to 101.2F.  They denied any new respiratory complaints.  Given his recent hospitalization and fever while on antibiotics, they brought him to the ER for further evaluation.     Upon arrival to the ER, vitals were temp 101.2F, , RR 24 and /75.  Labs showed WBC 17 with ESR 90 and Procal 0.56.  CXR didn't show a new consolidation.  His case was discussed with ID on call, who suggested Vanc, Cefepime, and Flagyl based on his previous cultures, as well as CT chest/abdomen/pelvis to include sacral wounds.  He was admitted to Hospital Medicine for further management and ID now consulted for fever despite being on ABX and no clears source.    Interval History:  Patient has undergone a CT CAP and colitis, a small amount of pneumoperitoneum found likely from perforation, chronic changes of the pelvic bones, and mild retained secretions in the left mainstem bronchus with artial opacification of the left lower lobe bronchus, but no significant airspace disease in the left lower lobe.  .  Primary team has G tube check and no extravasation seen and case discussed with Gen Tamez who felt patient jhonathan had small perforation and would monitor clinically.  Febrile to 101.2 on admit and now afebrile.  WBC 16 on admit now 11. Blood cultures NGTD.        Past Medical History:   Diagnosis Date    Acid reflux     Cerebral palsy     History of hip surgery     S/P percutaneous endoscopic gastrostomy (PEG) tube placement     Seizures        Past Surgical History:   Procedure Laterality Date     CHOLECYSTECTOMY      FLEXIBLE SIGMOIDOSCOPY N/A 9/9/2020    Procedure: SIGMOIDOSCOPY, FLEXIBLE;  Surgeon: America Goode MD;  Location: [facility] ([address] [place] [address]);  Service: Endoscopy;  Laterality: N/A;    HIP SURGERY         Review of patient's allergies indicates:  No Known Allergies    Medications:  Medications Prior to Admission   Medication Sig    ascorbic acid, vitamin C, (VITAMIN C) 500 MG tablet 1 tablet (500 mg total) by Per G Tube route once daily.    baclofen (LIORESAL) 10 MG tablet 1 tablet (10 mg total) by Per G Tube route 3 (three) times daily.    cholestyramine (QUESTRAN) 4 gram packet Take 1 packet (4 g total) by mouth 2 (two) times daily.    diphenhydrAMINE (BENADRYL) 12.5 mg/5 mL elixir 10 mLs (25 mg total) by Per G Tube route 4 (four) times daily as needed for Allergies.    gabapentin (NEURONTIN) 250 mg/5 mL solution 5 mLs (250 mg total) by Per G Tube route nightly. At bedtime    ibuprofen (ADVIL,MOTRIN) 100 mg/5 mL suspension 10 mLs (200 mg total) by Per G Tube route every 6 (six) hours as needed for Pain or Temperature greater than.    Lactobacillus rhamnosus GG (CULTURELLE) 10 billion cell capsule 1 capsule by Per G Tube route once daily.    loperamide (IMODIUM) 1 mg/7.5 mL solution 15 mLs (2 mg total) by Per G Tube route 4 (four) times daily. for 10 days    multivitamin liquid no.118 Liqd 10 mLs by Per G Tube route once daily.    nystatin (MYCOSTATIN) cream Apply topically 2 (two) times daily.    PHENobarbitaL 20 mg/5 mL (4 mg/mL) Elix elixir Take 25 mLs (100 mg total) by mouth every evening.    piperacillin sodium/tazobactam (PIPERACILLIN-TAZOBACTAM 4.5G/100ML SODIUM CHLORIDE 0.9%-READY TO MIX) Inject 300 mLs (13.5 g total) into the vein continuous. End date 9/24/2020. for 9 days    propranoloL (INDERAL) 20 MG tablet 1 tablet (20 mg total) by Per G Tube route 3 (three) times daily.     Antibiotics (From admission, onward)    Start     Stop Route Frequency Ordered    09/21/20  0800  vancomycin 750 mg in dextrose 5 % 250 mL IVPB (ready to mix system)      -- IV Every 12 hours (non-standard times) 09/20/20 2153 09/20/20 2230  ceFEPIme injection 2 g      -- IV Every 12 hours (non-standard times) 09/20/20 2128 09/20/20 2230  metronidazole IVPB 500 mg      -- IV Every 8 hours (non-standard times) 09/20/20 2128        Antifungals (From admission, onward)    None        Antivirals (From admission, onward)    None             There is no immunization history on file for this patient.    Family History     Problem Relation (Age of Onset)    Cancer Maternal Grandfather        Social History     Socioeconomic History    Marital status: Single     Spouse name: Not on file    Number of children: Not on file    Years of education: Not on file    Highest education level: Not on file   Occupational History    Not on file   Social Needs    Financial resource strain: Not on file    Food insecurity     Worry: Not on file     Inability: Not on file    Transportation needs     Medical: Not on file     Non-medical: Not on file   Tobacco Use    Smoking status: Never Smoker    Smokeless tobacco: Never Used   Substance and Sexual Activity    Alcohol use: Never     Frequency: Never    Drug use: Not Currently    Sexual activity: Not on file   Lifestyle    Physical activity     Days per week: Not on file     Minutes per session: Not on file    Stress: Not on file   Relationships    Social connections     Talks on phone: Not on file     Gets together: Not on file     Attends Spiritism service: Not on file     Active member of club or organization: Not on file     Attends meetings of clubs or organizations: Not on file     Relationship status: Not on file   Other Topics Concern    Not on file   Social History Narrative    Not on file     Review of Systems   Unable to perform ROS: Patient nonverbal     Objective:     Vital Signs (Most Recent):  Temp: 98.6 °F (37 °C) (09/21/20 0821)  Pulse: (!)  114 (09/21/20 0821)  Resp: 20 (09/21/20 0821)  BP: 126/79 (09/21/20 0821)  SpO2: 97 % (09/21/20 0821) Vital Signs (24h Range):  Temp:  [98.6 °F (37 °C)-101.2 °F (38.4 °C)] 98.6 °F (37 °C)  Pulse:  [105-121] 114  Resp:  [20-24] 20  SpO2:  [97 %-98 %] 97 %  BP: (111-135)/(66-79) 126/79     Weight: 33.1 kg (72 lb 15.6 oz)  Body mass index is 15.79 kg/m².    Estimated Creatinine Clearance: 108.5 mL/min (based on SCr of 0.5 mg/dL).    Physical Exam  Constitutional:       General: He is not in acute distress.     Appearance: He is well-developed. He is not diaphoretic.       HENT:      Head: Normocephalic and atraumatic.   Cardiovascular:      Rate and Rhythm: Normal rate and regular rhythm.      Heart sounds: Normal heart sounds. No murmur. No friction rub. No gallop.    Pulmonary:      Effort: Pulmonary effort is normal. No respiratory distress.      Breath sounds: Examination of the right-upper field reveals rhonchi. Examination of the left-upper field reveals rhonchi. Rhonchi present. No wheezing or rales.   Abdominal:      General: Bowel sounds are normal. There is no distension.      Palpations: Abdomen is soft. There is no mass.      Tenderness: There is no abdominal tenderness. There is no guarding or rebound.   Skin:     General: Skin is warm and dry.   Neurological:      Mental Status: He is alert.      Comments: Non verbal   Psychiatric:         Behavior: Behavior normal.                 Significant Labs:   Blood Culture:   Recent Labs   Lab 09/01/20  0043 09/01/20  0057 09/16/20  0928 09/20/20  1853 09/20/20  1854   LABBLOO No growth after 5 days. No growth after 5 days. No Growth to date  No Growth to date  No Growth to date  No Growth to date  No Growth to date No Growth to date No Growth to date     CBC:   Recent Labs   Lab 09/20/20  1853 09/21/20  0500   WBC 16.95* 11.77   HGB 10.0* 9.2*   HCT 31.5* 30.0*   * 521*     CMP:   Recent Labs   Lab 09/20/20  1853 09/21/20  0501   * 136   K 3.1*  3.3*    105   CO2 22* 20*   GLU 99 88   BUN 7 6   CREATININE 0.5 0.5   CALCIUM 8.3* 8.2*   PROT 6.9 6.3   ALBUMIN 2.2* 1.9*   BILITOT 0.2 0.1   ALKPHOS 108 96   AST 26 20   ALT 24 20   ANIONGAP 12 11   EGFRNONAA >60.0 >60.0     Urine Culture:   Recent Labs   Lab 08/26/20  0915   LABURIN No significant growth     Urine Studies:   Recent Labs   Lab 08/09/20  0555  09/16/20  1536   COLORU Yellow   < > Yellow   APPEARANCEUA Hazy*   < > Hazy*   PHUR 6.0   < > 6.0   SPECGRAV >=1.030*   < > 1.010   PROTEINUA 1+*   < > Negative   GLUCUA Negative   < > Negative   KETONESU Negative   < > Negative   BILIRUBINUA Negative   < > Negative   OCCULTUA 3+*   < > Negative   NITRITE Negative   < > Negative   UROBILINOGEN Negative  --   --    LEUKOCYTESUR Negative   < > Negative   RBCUA 40*   < > 1   WBCUA 8*   < > 2   BACTERIA Occasional   < > Occasional   SQUAMEPITHEL  --    < > 0   HYALINECASTS 2*   < > 3*    < > = values in this interval not displayed.     Wound Culture: No results for input(s): LABAERO in the last 4320 hours.  All pertinent labs within the past 24 hours have been reviewed.    Significant Imaging: I have reviewed all pertinent imaging results/findings within the past 24 hours.   CT Chest Abdomen Pelvis With Contrast [[id number]] (Abnormal) Resulted: 09/20/20 2310   Order Status: Completed Updated: 09/20/20 2312   Narrative:     EXAMINATION:   CT CHEST ABDOMEN PELVIS WITH CONTRAST (XPD)     CLINICAL HISTORY:   Sepsis;Please include sacral decub;     TECHNIQUE:   Low dose axial images, sagittal and coronal reformations were obtained from the thoracic inlet to the pubic symphysis following the IV administration of 100 mL of Omnipaque 350 .  Oral contrast was not given.     COMPARISON:   CT chest abdomen pelvis, 09/04/2020.     FINDINGS:   Chest:     Evaluation is limited by extensive streak artifact due to the patient's arms overlying the field of view.     Heart size is normal.  No large or central pulmonary  embolus.  Evaluation for small pulmonary emboli limited by motion degradation and extensive artifact.     No large consolidation.  No pleural fluid.  No pneumothorax.  There are mild retained secretions in the left mainstem bronchus.  Partial opacification of the left lower lobe bronchus, but no significant airspace disease in the left lower lobe.     Mild patulous appearance of the esophagus without focal wall thickening.     No bulky lymphadenopathy identified in the chest.     Abdomen:     Evaluation is limited by extensive streak artifact due to the patient's arms overlying the field of view.     Liver is negative for acute finding.  Gallbladder appears surgically absent.  No intrahepatic biliary ductal dilatation.     Spleen, adrenals, and pancreas are negative for acute finding.     Kidneys enhance symmetrically.  No hydronephrosis.     Percutaneous gastrostomy tube is present.  There are no findings of small bowel obstruction.  There is diffuse wall thickening and mucosal edema involving the ascending colon.  Colon is moderately distended with air and liquid stool.  There is small volume of pneumoperitoneum, predominantly layering along the anterior aspect of the mid abdomen.  No portal venous gas.     No organized fluid collection.     No bulky lymphadenopathy.     Abdominal aorta is normal in caliber.  SMA and JAVIER are patent.     Pelvis:     Urinary bladder is distended but otherwise unremarkable.  There are mild inflammatory changes involving the rectosigmoid colon, though improved when compared with the prior study.  Mild presacral edema.  No significant pelvic free fluid.     Bones and soft tissues:     Evaluation of the pelvic soft tissues is limited by extensive artifact related to positioning of the patient's lower extremities.  Chronic dysmorphic and erosive changes involving the bilateral femora and acetabula, similar to prior CT mild diffuse body wall edema in the upper and mid abdomen.  Marked  "soft tissue edema involving the lower abdominal wall and hip soft tissues.  Bilateral ischial tuberosity pressure ulcers with overlying skin defects and bandage material.  No new focal area bony erosion.  No drainable fluid collection, allowing for artifact limitations.  Probable left-sided hydrocele, similar to prior CT dated 08/11/2020.    Impression:       New small volume pneumoperitoneum predominantly along the anterior aspect of the mid abdomen.  Findings could be secondary to perforated viscus or gastrostomy tube leak.  Suggest surgical evaluation as clinically appropriate.     Inflammatory changes and wall thickening involving the ascending and rectosigmoid colon suggestive of a nonspecific colitis.     Debris in the left mainstem and left lower lobe bronchus suggestive of retained secretions/mucus or aspiration.     Gaseous distention of bowel which could be related to ileus.     Chronic changes involving the pelvic bones.  Bilateral ischial tuberosity decubitus ulcers with overlying bandage material.  No drainable fluid collection.     This report was flagged in Epic as abnormal.     Critical finding was called to Rosalie Parikh MD by Sheldon Esquivel MD at 22:54 on 09/20/2020.       Electronically signed by: Sheldon Esquivel MD   Date: 09/20/2020   Time: 23:10   X-Ray Chest AP Portable [[id number]] Resulted: 09/20/20 2013   Order Status: Completed Updated: 09/20/20 2016   Narrative:     EXAMINATION:   XR CHEST AP PORTABLE     CLINICAL HISTORY:   Provided history is "Sepsis;  ".     TECHNIQUE:   One view of the chest.     COMPARISON:   09/01/2020.     FINDINGS:   Cardiac wires overlie the chest.  Cardiomediastinal silhouette is not enlarged.  No focal consolidation.  No sizable pleural effusion.  No pneumothorax.  Mild gaseous distention of bowel in the upper abdomen.    Impression:       No acute cardiopulmonary finding identified on this single view.       Electronically signed by: Sheldon Esquivel MD   Date: " 09/20/2020   Time: 20:13   Imaging History    2020  Date Procedure Name Status Accession Number Location   09/20/20 10:21 PM CT Chest Abdomen Pelvis [facility] Contrast [id number]   09/20/20 08:08 PM [facility] Portable [id number] [facility]   09/17/20 04:54 PM US Lower Extremity Veins Bilateral Final [id number] [facility]   09/09/20 08:43 AM US Abdomen Limited Final [id number] [facility]   09/04/20 05:42 PM [facility] Chest Non Coronary [facility] [id number] [facility]   09/04/20 05:41 PM CT Abdomen Pelvis With Contrast [facility] [id number] [facility]   09/01/20 11:47 AM US Lower Extremity Veins Bilateral Final [id number] [facility]   09/01/20 11:46 AM US Upper Extremity Veins Right Final [id number] [facility]   09/01/20 11:46 AM US Upper Extremity Veins Left [id number] [facility]   09/01/20 09:54 AM X-Ray Chest 1 View Final [id number] [facility]   08/30/20 11:25 AM [facility] Chest 1 View [facility] [id number] [facility]   08/28/20 01:50 PM X-Ray Chest AP Portable Final [id number] [facility]   08/27/20 04:42 AM [facility] Chest 1 View [id number] [facility]   08/26/20 10:30 AM X-Ray Abdomen AP 1 View (KUB) Final [id number] [facility]   08/26/20 08:28 AM X-Ray Abdomen AP 1 View (KUB) Final [id number] [facility]   08/26/20 03:30 AM X-Ray Chest 1 View Final [id number] [facility]   08/26/20 11:20 AM Echo Color Flow Doppler? Yes [id number] [facility]

## 2020-09-21 NOTE — SUBJECTIVE & OBJECTIVE
Past Medical History:   Diagnosis Date    Acid reflux     Cerebral palsy     History of hip surgery     S/P percutaneous endoscopic gastrostomy (PEG) tube placement     Seizures        Past Surgical History:   Procedure Laterality Date    CHOLECYSTECTOMY      FLEXIBLE SIGMOIDOSCOPY N/A 9/9/2020    Procedure: SIGMOIDOSCOPY, FLEXIBLE;  Surgeon: America Goode MD;  Location: [address]);  Service: Endoscopy;  Laterality: N/A;    HIP SURGERY         Review of patient's allergies indicates:  No Known Allergies    Medications:  Medications Prior to Admission   Medication Sig    ascorbic acid, vitamin C, (VITAMIN C) 500 MG tablet 1 tablet (500 mg total) by Per G Tube route once daily.    baclofen (LIORESAL) 10 MG tablet 1 tablet (10 mg total) by Per G Tube route 3 (three) times daily.    cholestyramine (QUESTRAN) 4 gram packet Take 1 packet (4 g total) by mouth 2 (two) times daily.    diphenhydrAMINE (BENADRYL) 12.5 mg/5 mL elixir 10 mLs (25 mg total) by Per G Tube route 4 (four) times daily as needed for Allergies.    gabapentin (NEURONTIN) 250 mg/5 mL solution 5 mLs (250 mg total) by Per G Tube route nightly. At bedtime    ibuprofen (ADVIL,MOTRIN) 100 mg/5 mL suspension 10 mLs (200 mg total) by Per G Tube route every 6 (six) hours as needed for Pain or Temperature greater than.    Lactobacillus rhamnosus GG (CULTURELLE) 10 billion cell capsule 1 capsule by Per G Tube route once daily.    loperamide (IMODIUM) 1 mg/7.5 mL solution 15 mLs (2 mg total) by Per G Tube route 4 (four) times daily. for 10 days    multivitamin liquid no.118 Liqd 10 mLs by Per G Tube route once daily.    nystatin (MYCOSTATIN) cream Apply topically 2 (two) times daily.    PHENobarbitaL 20 mg/5 mL (4 mg/mL) Elix elixir Take 25 mLs (100 mg total) by mouth every evening.    piperacillin sodium/tazobactam (PIPERACILLIN-TAZOBACTAM 4.5G/100ML SODIUM CHLORIDE 0.9%-READY TO MIX) Inject 300 mLs (13.5 g total) into the vein  continuous. End date 9/24/2020. for 9 days    propranoloL (INDERAL) 20 MG tablet 1 tablet (20 mg total) by Per G Tube route 3 (three) times daily.     Antibiotics (From admission, onward)    Start     Stop Route Frequency Ordered    09/21/20 0800  vancomycin 750 mg in dextrose 5 % 250 mL IVPB (ready to mix system)      -- IV Every 12 hours (non-standard times) 09/20/20 2153 09/20/20 2230  ceFEPIme injection 2 g      -- IV Every 12 hours (non-standard times) 09/20/20 2128 09/20/20 2230  metronidazole IVPB 500 mg      -- IV Every 8 hours (non-standard times) 09/20/20 2128        Antifungals (From admission, onward)    None        Antivirals (From admission, onward)    None             There is no immunization history on file for this patient.    Family History     Problem Relation (Age of Onset)    Cancer Maternal Grandfather        Social History     Socioeconomic History    Marital status: Single     Spouse name: Not on file    Number of children: Not on file    Years of education: Not on file    Highest education level: Not on file   Occupational History    Not on file   Social Needs    Financial resource strain: Not on file    Food insecurity     Worry: Not on file     Inability: Not on file    Transportation needs     Medical: Not on file     Non-medical: Not on file   Tobacco Use    Smoking status: Never Smoker    Smokeless tobacco: Never Used   Substance and Sexual Activity    Alcohol use: Never     Frequency: Never    Drug use: Not Currently    Sexual activity: Not on file   Lifestyle    Physical activity     Days per week: Not on file     Minutes per session: Not on file    Stress: Not on file   Relationships    Social connections     Talks on phone: Not on file     Gets together: Not on file     Attends Qu[employer] service: Not on file     Active member of club or organization: Not on file     Attends meetings of clubs or organizations: Not on file     Relationship status: Not on file    Other Topics Concern    Not on file   Social History Narrative    Not on file     Review of Systems   Unable to perform ROS: Patient nonverbal     Objective:     Vital Signs (Most Recent):  Temp: 98.6 °F (37 °C) (09/21/20 0821)  Pulse: (!) 114 (09/21/20 0821)  Resp: 20 (09/21/20 0821)  BP: 126/79 (09/21/20 0821)  SpO2: 97 % (09/21/20 0821) Vital Signs (24h Range):  Temp:  [98.6 °F (37 °C)-101.2 °F (38.4 °C)] 98.6 °F (37 °C)  Pulse:  [105-121] 114  Resp:  [20-24] 20  SpO2:  [97 %-98 %] 97 %  BP: (111-135)/(66-79) 126/79     Weight: 33.1 kg (72 lb 15.6 oz)  Body mass index is 15.79 kg/m².    Estimated Creatinine Clearance: 108.5 mL/min (based on SCr of 0.5 mg/dL).    Physical Exam  Constitutional:       General: He is not in acute distress.     Appearance: He is well-developed. He is not diaphoretic.       HENT:      Head: Normocephalic and atraumatic.   Cardiovascular:      Rate and Rhythm: Normal rate and regular rhythm.      Heart sounds: Normal heart sounds. No murmur. No friction rub. No gallop.    Pulmonary:      Effort: Pulmonary effort is normal. No respiratory distress.      Breath sounds: Examination of the right-upper field reveals rhonchi. Examination of the left-upper field reveals rhonchi. Rhonchi present. No wheezing or rales.   Abdominal:      General: Bowel sounds are normal. There is no distension.      Palpations: Abdomen is soft. There is no mass.      Tenderness: There is no abdominal tenderness. There is no guarding or rebound.   Skin:     General: Skin is warm and dry.   Neurological:      Mental Status: He is alert.      Comments: Non verbal   Psychiatric:         Behavior: Behavior normal.                 Significant Labs:   Blood Culture:   Recent Labs   Lab 09/01/20  0043 09/01/20  0057 09/16/20  0928 09/20/20  1853 09/20/20  1854   LABBLOO No growth after 5 days. No growth after 5 days. No Growth to date  No Growth to date  No Growth to date  No Growth to date  No Growth to date No  Growth to date No Growth to date     CBC:   Recent Labs   Lab 09/20/20  1853 09/21/20  0500   WBC 16.95* 11.77   HGB 10.0* 9.2*   HCT 31.5* 30.0*   * 521*     CMP:   Recent Labs   Lab 09/20/20  1853 09/21/20  0501   * 136   K 3.1* 3.3*    105   CO2 22* 20*   GLU 99 88   BUN 7 6   CREATININE 0.5 0.5   CALCIUM 8.3* 8.2*   PROT 6.9 6.3   ALBUMIN 2.2* 1.9*   BILITOT 0.2 0.1   ALKPHOS 108 96   AST 26 20   ALT 24 20   ANIONGAP 12 11   EGFRNONAA >60.0 >60.0     Urine Culture:   Recent Labs   Lab 08/26/20  0915   LABURIN No significant growth     Urine Studies:   Recent Labs   Lab 08/09/20  0555  09/16/20  1536   COLORU Yellow   < > Yellow   APPEARANCEUA Hazy*   < > Hazy*   PHUR 6.0   < > 6.0   SPECGRAV >=1.030*   < > 1.010   PROTEINUA 1+*   < > Negative   GLUCUA Negative   < > Negative   KETONESU Negative   < > Negative   BILIRUBINUA Negative   < > Negative   OCCULTUA 3+*   < > Negative   NITRITE Negative   < > Negative   UROBILINOGEN Negative  --   --    LEUKOCYTESUR Negative   < > Negative   RBCUA 40*   < > 1   WBCUA 8*   < > 2   BACTERIA Occasional   < > Occasional   SQUAMEPITHEL  --    < > 0   HYALINECASTS 2*   < > 3*    < > = values in this interval not displayed.     Wound Culture: No results for input(s): LABAERO in the last 4320 hours.  All pertinent labs within the past 24 hours have been reviewed.    Significant Imaging: I have reviewed all pertinent imaging results/findings within the past 24 hours.   CT Chest Abdomen Pelvis With Contrast [[id number]] (Abnormal) Resulted: 09/20/20 2310   Order Status: Completed Updated: 09/20/20 2312   Narrative:     EXAMINATION:   CT CHEST ABDOMEN PELVIS WITH CONTRAST (XPD)     CLINICAL HISTORY:   Sepsis;Please include sacral decub;     TECHNIQUE:   Low dose axial images, sagittal and coronal reformations were obtained from the thoracic inlet to the pubic symphysis following the IV administration of 100 mL of Omnipaque 350 .  Oral contrast was not given.      COMPARISON:   CT chest abdomen pelvis, 09/04/2020.     FINDINGS:   Chest:     Evaluation is limited by extensive streak artifact due to the patient's arms overlying the field of view.     Heart size is normal.  No large or central pulmonary embolus.  Evaluation for small pulmonary emboli limited by motion degradation and extensive artifact.     No large consolidation.  No pleural fluid.  No pneumothorax.  There are mild retained secretions in the left mainstem bronchus.  Partial opacification of the left lower lobe bronchus, but no significant airspace disease in the left lower lobe.     Mild patulous appearance of the esophagus without focal wall thickening.     No bulky lymphadenopathy identified in the chest.     Abdomen:     Evaluation is limited by extensive streak artifact due to the patient's arms overlying the field of view.     Liver is negative for acute finding.  Gallbladder appears surgically absent.  No intrahepatic biliary ductal dilatation.     Spleen, adrenals, and pancreas are negative for acute finding.     Kidneys enhance symmetrically.  No hydronephrosis.     Percutaneous gastrostomy tube is present.  There are no findings of small bowel obstruction.  There is diffuse wall thickening and mucosal edema involving the ascending colon.  Colon is moderately distended with air and liquid stool.  There is small volume of pneumoperitoneum, predominantly layering along the anterior aspect of the mid abdomen.  No portal venous gas.     No organized fluid collection.     No bulky lymphadenopathy.     Abdominal aorta is normal in caliber.  SMA and JAVIER are patent.     Pelvis:     Urinary bladder is distended but otherwise unremarkable.  There are mild inflammatory changes involving the rectosigmoid colon, though improved when compared with the prior study.  Mild presacral edema.  No significant pelvic free fluid.     Bones and soft tissues:     Evaluation of the pelvic soft tissues is limited by extensive  "artifact related to positioning of the patient's lower extremities.  Chronic dysmorphic and erosive changes involving the bilateral femora and acetabula, similar to prior CT mild diffuse body wall edema in the upper and mid abdomen.  Marked soft tissue edema involving the lower abdominal wall and hip soft tissues.  Bilateral ischial tuberosity pressure ulcers with overlying skin defects and bandage material.  No new focal area bony erosion.  No drainable fluid collection, allowing for artifact limitations.  Probable left-sided hydrocele, similar to prior CT dated 08/11/2020.    Impression:       New small volume pneumoperitoneum predominantly along the anterior aspect of the mid abdomen.  Findings could be secondary to perforated viscus or gastrostomy tube leak.  Suggest surgical evaluation as clinically appropriate.     Inflammatory changes and wall thickening involving the ascending and rectosigmoid colon suggestive of a nonspecific colitis.     Debris in the left mainstem and left lower lobe bronchus suggestive of retained secretions/mucus or aspiration.     Gaseous distention of bowel which could be related to ileus.     Chronic changes involving the pelvic bones.  Bilateral ischial tuberosity decubitus ulcers with overlying bandage material.  No drainable fluid collection.     This report was flagged in Epic as abnormal.     Critical finding was called to Rosalie Parikh MD by Sheldon Esquivel MD at 22:54 on 09/20/2020.       Electronically signed by: Sheldon Esquivel MD   Date: 09/20/2020   Time: 23:10   X-Ray Chest AP Portable [[id number]] Resulted: 09/20/20 2013   Order Status: Completed Updated: 09/20/20 2016   Narrative:     EXAMINATION:   XR CHEST AP PORTABLE     CLINICAL HISTORY:   Provided history is "Sepsis;  ".     TECHNIQUE:   One view of the chest.     COMPARISON:   09/01/2020.     FINDINGS:   Cardiac wires overlie the chest.  Cardiomediastinal silhouette is not enlarged.  No focal consolidation.  No " sizable pleural effusion.  No pneumothorax.  Mild gaseous distention of bowel in the upper abdomen.    Impression:       No acute cardiopulmonary finding identified on this single view.       Electronically signed by: Sheldon Esquivel MD   Date: 09/20/2020   Time: 20:13   Imaging History    2020  Date Procedure Name Status Accession Number Location   09/20/20 10:21 PM CT Chest Abdomen Pelvis [id number]   09/20/20 08:08 PM X-Ray Chest AP Portable Final [id number] [facility]   09/17/20 04:54 PM US Lower Extremity Veins Bilateral Final [id number] [facility]   09/09/20 08:43 AM US Abdomen Limited Final [id number] [facility]   09/04/20 05:42 PM CTA Chest Non Coronary Final [id number] [facility]   09/04/20 05:41 PM CT Abdomen Pelvis With Contrast Final [id number] [facility]   09/01/20 11:47 AM US Lower Extremity Veins Bilateral Final [id number] [facility]   09/01/20 11:46 AM US Upper Extremity Veins Right Final [id number] [facility]   09/01/20 11:46 AM US Upper Extremity Veins Left Final [id number] [facility]   09/01/20 09:54 AM X-Ray Chest 1 View Final [id number] [facility]   08/30/20 11:25 AM X-Ray Chest 1 View Final [id number] [facility]   08/28/20 01:50 PM X-Ray Chest AP Portable Final [id number] [facility]   08/27/20 04:42 AM X-Ray Chest 1 View Final [id number] [facility]   08/26/20 10:30 AM X-Ray Abdomen AP 1 View (KUB) Final [id number] [facility]   08/26/20 08:28 AM X-Ray Abdomen AP 1 View (KUB) Final [id number] [facility]   08/26/20 03:30 AM X-Ray Chest 1 View Final [id number] [facility]   08/26/20 11:20 AM Echo Color Flow Doppler? Yes Final [id number] [facility]

## 2020-09-21 NOTE — H&P
Hospital Medicine  History and Physical  [facility]      Patient Name: Glen Moscoso  MRN:  [id number]  Hospital Medicine Team: [facility] Rosalie Parikh MD  Date of Admission:  9/20/2020     Principal Problem:  Sepsis   Primary Care Physician: Yadi Lawson MD       History of Present Illness:    Mr. Glen Moscoso is a 24 y.o. male with CP, complicated by seizures and a peg tube, who presents to the ER for evaluation of fever.  He just had a prolonged and complex hospital stay [facility] [place]-9/18 for seizures and pneumonia.  He was originally admitted to [facility], where he was found to have Pseudomonas and Serratia pneumonia, as well as a decubitus ulcer that was debrided by Gen Surg on 9/7 - cultures were not sent.  He was discharged home 9/18 on Zosyn via LUE PICC until 9/24.  Family at bedside reports he was doing well until the day of admission when he started to have fevers up to 101.2F.  They deny any new respiratory complaints.  Given his recent hospitalization and fever while on antibiotics, they brought him to the ER for further evaluation.    Upon arrival to the ER, vitals were temp 101.2F, , RR 24 and /75.  Labs showed WBC 17 with ESR 90 and Procal 0.56.  CXR didn't show a new consolidation.  His case was discussed with ID, who suggested Vanc, Cefepime, and Flagyl based on his previous cultures, as well as CT chest/abdomen/pelvis to include sacral wounds.  He was admitted to Hospital Medicine for further management.        Review of Systems:  Constitutional: +fever  HENT: Negative for sore throat, trouble swallowing.    Eyes: Negative for photophobia, visual disturbance.   Respiratory: Negative for cough, shortness of breath.    Cardiovascular: Negative for chest pain, palpitations, leg swelling.   Gastrointestinal: Negative for abdominal pain, nausea, vomiting, diarrhea, constipation  Endocrine: Negative for cold intolerance, heat intolerance.   Genitourinary:  Negative for dysuria, frequency.   Musculoskeletal: Negative for arthralgias, myalgias.   Skin: Negative for rash, wound, erythema   Neurological: Negative for numbness, paresthesias  Psychiatric/Behavioral: Negative for confusion, hallucinations, anxiety  All other systems reviewed and are negative.      Past Medical History: Patient has a past medical history of Acid reflux, Cerebral palsy, History of hip surgery, S/P percutaneous endoscopic gastrostomy (PEG) tube placement, and Seizures.      Past Surgical History: Patient has a past surgical history that includes Hip surgery; Cholecystectomy; and Flexible sigmoidoscopy (N/A, 9/9/2020).      Social History: Patient reports that he has never smoked. He has never used smokeless tobacco. He reports previous drug use. He reports that he does not drink alcohol.      Family History: Patient's family history includes Cancer in his maternal grandfather.      Medications: Scheduled Meds:   [START ON 9/21/2020] ascorbic acid (vitamin C)  500 mg Per G Tube Daily    baclofen  10 mg Per G Tube TID    ceFEPime (MAXIPIME) IVPB  1 g Intravenous Q12H    diphenhydrAMINE  12.5 mg Per G Tube QHS    gabapentin  250 mg Per G Tube QHS    heparin (porcine)  5,000 Units Subcutaneous Q8H    [START ON 9/21/2020] Lactobacillus rhamnosus GG  1 capsule Per G Tube Daily    loperamide  2 mg Per G Tube QID    metronidazole  500 mg Intravenous Q8H    [START ON 9/21/2020] multivitamin liquid no.118  10 mL Per G Tube Daily    PHENobarbitaL  100 mg Per G Tube QHS    potassium bicarbonate  50 mEq Per G Tube Once    propranoloL  20 mg Per G Tube TID    vancomycin (VANCOCIN) IVPB  1,000 mg Intravenous ED 1 Time     Continuous Infusions:   0/9% NACL & POTASSIUM CHLORIDE 20 MEQ/L       PRN Meds:.acetaminophen, iohexoL, ondansetron, promethazine (PHENERGAN) IVPB, sodium chloride 0.9%, Pharmacy to dose Vancomycin consult **AND** vancomycin - pharmacy to dose      Allergies: Patient has No  Known Allergies.      Physical Exam:    Temp:  [101.2 °F (38.4 °C)]   Pulse:  [110-112]   Resp:  [24]   BP: (111-132)/(73-75)   SpO2:  [97 %-98 %]     Constitutional: Appears sickly.  Frail.  Head: Normocephalic and atraumatic.   Mouth/Throat: Oropharynx with thrush.  Dry cracked lips  Eyes: EOM are normal. Pupils are equal, round, and reactive to light. No scleral icterus.   Neck: Normal range of motion. Neck supple.   Cardiovascular: Normal heart rate.  Regular heart rhythm.  No murmur heard.  Pulmonary/Chest: Effort normal. No respiratory distress. Rhonchi throughout.  Abdominal: Soft. Bowel sounds are normal.  No distension.  No tenderness.  Peg  Musculoskeletal: Multiple contractures.   Neurological: Makes eye contact.  Skin: Skin is warm and dry.   Psychiatric: Normal mood and affect. Behavior is normal.       No intake or output data in the 24 hours ending 09/20/20 2132  Recent Labs   Lab 09/17/20  0601 09/18/20 0445 09/20/20  1853   WBC 9.58 8.22 16.95*   HGB 9.3* 9.0* 10.0*   HCT 29.7* 29.3* 31.5*   * 657* 535*     Recent Labs   Lab 09/14/20  0848 09/16/20  0606 09/18/20  0445 09/20/20  1853    138 137 135*   K 3.9 4.3 3.5 3.1*    102 102 101   CO2 26 24 24 22*   BUN 7 8 6 7   CREATININE 0.5 0.6 0.5 0.5    94 84 99   CALCIUM 8.5* 9.4 8.7 8.3*   PHOS 2.9  --   --   --      Recent Labs   Lab 09/14/20  0848 09/20/20  1853   ALKPHOS  --  108   ALT  --  24   AST  --  26   ALBUMIN 1.8* 2.2*   PROT  --  6.9   BILITOT  --  0.2      Recent Labs     09/20/20  1853   LACTATE 0.8      No results for input(s): CPK, CPKMB, MB, TROPONINI in the last 72 hours.      Assessment and Plan:    Mr. Glen Moscoso is a 24 y.o. male who presented to [facility] on 9/20/2020 with fevers     Sepsis  Fever  · 4/4 SIRS:  Temp 101.2F, , RR 24, WBC 17  · Source unclear at this time - possibly 2/2 infected decubitus?  · Has been on Zosyn outpatient for recently diagnosed Pseudomonas and Serratia pneumonia with  end date of 9/24  · PICC placed 9/18  · Blood cultures pending  · UA pending  · ID consulted:  Suggest CT chest/abdomen/pelvis as well as Vanc, Cefepime, and Flagyl.  Can consider Meropenem in the event of instability, but need to cautiously in the setting of known seizures    Pneumonia 2/2 Pseudomonas and Serratia  · Satting 98% on room air  · Family reports rhonchi at baseline  · Management as above    Decubitus Ulcer of Ischial Area, Stage4  Pressure Injury of Right Ischium, Stage4  · S/p debridement with Gen Surg 9/7  · ESR 90, Procal 0.56, CRP pending  · CT pelvis as above  · Continue Vitamin C daily  · Low air mattress  · Turn q2h    Proctocolitis  · From previous admission  · Has been on Zosyn as above  · At 11pm on 9/20, Radiology called informing of pneumoperitoneum.  Gen Surg consulted, but might need CRS    Cerebral Palsy  · Chronic issue  · Continue Baclofen 10mg TID    Seizures Disorder  · Chronic issue  · Continue Phenobarbital 100mg qHS    Severe Protein Calorie Malnutrition  · Dietary consult for TF - Hold for now with pneumoperitoneum    Hypokalemia  Hypophosphatemia  · K 3.1  · Phos 2.4  · Replace       Diet:  TF  VTE PPx:  Loenox  Goals of Care:  Full      High Risk Conditions:   Patient is currently on drug therapy requiring intensive monitoring for toxicity: Vancomycin       Disposition:  Pending ID and fever  Discharge Needs:  Home health      Rosalie Parikh MD  [facility] Medicine  Medical Director, [facility]  Cell:  [phone]  Spectra:  [phone]

## 2020-09-21 NOTE — CONSULTS
Wound care consult received from MD for assessment of buttocks, ears, scrotum, right thumb. Patient known to wound care team from previous admissions.     Assessments and pictures listed below.  Bilateral ears and right thumb have resolved.     Recommendations:   -Nursing to cleanse scrotal skin breakdown and R and L ischial pressure injuries with wound cleanser. Pack ischial pressure injuries with triad ointment and gauze, then cover bordered foam dressing to prove autolytic debridement and prevent breakdown of intact skin. Apply triad ointment over scrotal skin breakdown.     -GRAY surface     Nursing and MD team notified with recommendations.   Wound care to continue to follow pt PRN [id number]    L ear resolved.       R ear resolved.       Right thumb resolved.       Left ischial: 6 x 6.5 x 1.5 cm with 0.5 cm undermining at 9 o'clock.   Upon assessment noted stage 4 pressure injury. Full thickness skin loss with  yellow tan slough to 50% of wound bed. Maceration to periwound and no bone exposed. Minimal serosanguineous drainage and no odor.   Scrotum: healing full thickness skin loss        Right ischium: 6 x 5 x 1.5 cm- 2 cm undermining at 9 o'clock   Upon assessment noted stage 4 pressure injury. Full thickness skin loss wand red moist granulated tissue and yellow tan slough to wound bed. Maceration to periwound and bone palpable at undermining. Minimal serosanguineous drainage and no odor.

## 2020-09-21 NOTE — ASSESSMENT & PLAN NOTE
25 yo non-verbal M with CP and poor functional status with recent PNA and proctocolitis; now with fevers, leukocytosis, and imaging findings consistent with ongoing colonic inflammation and small volume pneumoperitoneum.     - Suspect that this finding is related to a small colonic perforation. Given the patient's comorbid conditions and poor functional status, he is certainly at high surgical risk. Given patient's current clinical stability, would recommend holding off on aggressive surgical intervention at this time and continue to manage conservatively with ongoing fluid resuscitation and antibiosis.   - Surgery will follow patient closely for any signs of clinical decline that may ultimately require surgical intervention.   - Discussed case with patient's mother over the phone who voiced understanding and acceptance of the above plan.   - Please contact Surgery with any significant clinical changes or evidence of clinical decline.   - D/w staff on call.

## 2020-09-21 NOTE — CONSULTS
[facility]  Infectious Disease  Consult Note    Patient Name: Glen Moscoso  MRN: [id number]  Admission Date: 9/20/2020  Hospital Length of Stay: 1 days  Attending Physician: Christinao Rainey MD  Primary Care Provider: Yadi Lawson MD       Inpatient consult to Infectious Diseases  Consult performed by: JO Kam Jr.  Consult ordered by: Rosalie Parikh MD      Consult received.  Full consult to follow.    JO Shin  Infectious Disease  [facility]

## 2020-09-21 NOTE — SUBJECTIVE & OBJECTIVE
No current facility-administered medications on file prior to encounter.      Current Outpatient Medications on File Prior to Encounter   Medication Sig    ascorbic acid, vitamin C, (VITAMIN C) 500 MG tablet 1 tablet (500 mg total) by Per G Tube route once daily.    baclofen (LIORESAL) 10 MG tablet 1 tablet (10 mg total) by Per G Tube route 3 (three) times daily.    cholestyramine (QUESTRAN) 4 gram packet Take 1 packet (4 g total) by mouth 2 (two) times daily.    diphenhydrAMINE (BENADRYL) 12.5 mg/5 mL elixir 10 mLs (25 mg total) by Per G Tube route 4 (four) times daily as needed for Allergies.    gabapentin (NEURONTIN) 250 mg/5 mL solution 5 mLs (250 mg total) by Per G Tube route nightly. At bedtime    ibuprofen (ADVIL,MOTRIN) 100 mg/5 mL suspension 10 mLs (200 mg total) by Per G Tube route every 6 (six) hours as needed for Pain or Temperature greater than.    Lactobacillus rhamnosus GG (CULTURELLE) 10 billion cell capsule 1 capsule by Per G Tube route once daily.    loperamide (IMODIUM) 1 mg/7.5 mL solution 15 mLs (2 mg total) by Per G Tube route 4 (four) times daily. for 10 days    multivitamin liquid no.118 Liqd 10 mLs by Per G Tube route once daily.    nystatin (MYCOSTATIN) cream Apply topically 2 (two) times daily.    PHENobarbitaL 20 mg/5 mL (4 mg/mL) Elix elixir Take 25 mLs (100 mg total) by mouth every evening.    piperacillin sodium/tazobactam (PIPERACILLIN-TAZOBACTAM 4.5G/100ML SODIUM CHLORIDE 0.9%-READY TO MIX) Inject 300 mLs (13.5 g total) into the vein continuous. End date 9/24/2020. for 9 days    propranoloL (INDERAL) 20 MG tablet 1 tablet (20 mg total) by Per G Tube route 3 (three) times daily.       Review of patient's allergies indicates:  No Known Allergies    Past Medical History:   Diagnosis Date    Acid reflux     Cerebral palsy     History of hip surgery     S/P percutaneous endoscopic gastrostomy (PEG) tube placement     Seizures      Past Surgical History:   Procedure  Laterality Date    CHOLECYSTECTOMY      FLEXIBLE SIGMOIDOSCOPY N/A 9/9/2020    Procedure: SIGMOIDOSCOPY, FLEXIBLE;  Surgeon: America Goode MD;  Location: [facility] ([address] [place] [address]);  Service: Endoscopy;  Laterality: N/A;    HIP SURGERY       Family History     Problem Relation (Age of Onset)    Cancer Maternal Grandfather        Tobacco Use    Smoking status: Never Smoker    Smokeless tobacco: Never Used   Substance and Sexual Activity    Alcohol use: Never     Frequency: Never    Drug use: Not Currently    Sexual activity: Not on file     Review of Systems   Reason unable to perform ROS: non-verbal.     Objective:     Vital Signs (Most Recent):  Temp: 99.8 °F (37.7 °C) (09/20/20 2323)  Pulse: 110 (09/20/20 2350)  Resp: 20 (09/20/20 2350)  BP: 135/66 (09/20/20 2350)  SpO2: 97 % (09/20/20 2350) Vital Signs (24h Range):  Temp:  [99.8 °F (37.7 °C)-101.2 °F (38.4 °C)] 99.8 °F (37.7 °C)  Pulse:  [110-112] 110  Resp:  [20-24] 20  SpO2:  [97 %-98 %] 97 %  BP: (111-135)/(66-75) 135/66     Weight: 33.1 kg (73 lb)  Body mass index is 15.8 kg/m².    Physical Exam  Constitutional:       General: He is awake. He is not in acute distress.  HENT:      Head: Normocephalic and atraumatic.   Eyes:      Conjunctiva/sclera: Conjunctivae normal.   Cardiovascular:      Rate and Rhythm: Regular rhythm. Tachycardia present.   Pulmonary:      Effort: Pulmonary effort is normal. No respiratory distress.   Abdominal:      General: There is distension.      Palpations: Abdomen is soft.      Tenderness: There is abdominal tenderness (diffuse, grimaces to palpation).      Comments: KEYSHAWN-KEY tube in place; healed transverse laparotomy incision   Musculoskeletal:         General: Deformity present.   Skin:     General: Skin is warm and dry.   Neurological:      Mental Status: He is easily aroused. Mental status is at baseline.         Significant Labs:  CBC:   Recent Labs   Lab 09/20/20  1853   WBC 16.95*   RBC 3.27*   HGB 10.0*   HCT 31.5*    *   MCV 96   MCH 30.6   MCHC 31.7*     CMP:   Recent Labs   Lab 09/20/20  1853   GLU 99   CALCIUM 8.3*   ALBUMIN 2.2*   PROT 6.9   *   K 3.1*   CO2 22*      BUN 7   CREATININE 0.5   ALKPHOS 108   ALT 24   AST 26   BILITOT 0.2     Lactate 0.8    Significant Diagnostics:  CT reviewed - findings as above.

## 2020-09-21 NOTE — PLAN OF CARE
Patient with HOB elevated 30-45 degrees, care discussed with Dr. Rainey to hold morning meds until xray comes abd comes back. Dr. Rainey gives the okay at bedside to use g-tube, hold on NG tube at this time. Patient with 100.2 ax temp and MD made aware. Ongoing assessment being made, 2 lose BM, complete bath given by this nurse. Resp even and unlabored. Call light within reach and frequent checks, NS with 20meq of K infusing at 75ml/hr w/o difficulties.

## 2020-09-21 NOTE — HPI
Mr. Glen Moscoso is a 24 y.o. male with CP, complicated by seizures and a peg tube, who presents to the ER for evaluation of fever.  He just had a prolonged and complex hospital stay at [facility] from 8/26-9/18 for seizures and pneumonia.  He was originally admitted to [facility] with respiratory failure, where he was found to have Pseudomonas and Serratia pneumonia, as well as a decubitus ulcer that was debrided by Gen Surg on 9/7 - cultures were not sent.  He had repeat resp cultures that showed Serratia.  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  Skeletal structures otherwise intact without evidence of erosive change. He was followed by ID.  He was discharged home 9/18 on Zosyn via LUE PICC until 9/24 (to complete 2 weeks).      Family at bedside on admission reported he was doing well until the day of admission when he started to have fevers up to 101.2F.  They denied any new respiratory complaints.  Given his recent hospitalization and fever while on antibiotics, they brought him to the ER for further evaluation.     Upon arrival to the ER, vitals were temp 101.2F, , RR 24 and /75.  Labs showed WBC 17 with ESR 90 and Procal 0.56.  CXR didn't show a new consolidation.  His case was discussed with ID on call, who suggested Vanc, Cefepime, and Flagyl based on his previous cultures, as well as CT chest/abdomen/pelvis to include sacral wounds.  He was admitted to Hospital Medicine for further management and ID now consulted for fever despite being on ABX and no clears source.    Interval History:  Patient has undergone a CT CAP and colitis, a small amount of pneumoperitoneum found likely from perforation, chronic changes of the pelvic bones, and mild retained secretions in the left mainstem bronchus with artial opacification of the left lower lobe bronchus, but  no significant airspace disease in the left lower lobe.  .  Primary team has G tube check and no extravasation seen and case discussed with Gen Tamez who felt patient jhonathan had small perforation and would monitor clinically.  Febrile to 101.2 on admit and now afebrile.  WBC 16 on admit now 11. Blood cultures NGTD.

## 2020-09-21 NOTE — CARE UPDATE
Rapid Response Nurse Chart Check     Chart check completed, abnormal VS noted. Charge RN, Liana contacted. No concerns verbalized at this time. Instructed to call [phone] for further concerns or assistance.

## 2020-09-21 NOTE — HPI
Mr. Glen Moscoso is a 24 y.o. male with CP, complicated by seizures who presented to the ER for evaluation of fever.  He just had a prolonged and complex hospital stay at [facility] from 8/26-9/18 for seizures, pneumonia, and proctocolitis. He was originally admitted to [facility], where he was found to have Pseudomonas and Serratia pneumonia, as well as a decubitus ulcer that was debrided by Gen Surg on 9/7. He underwent flex sig on 9/9 by GI - biopsies revealed non-specific inflammatory changes; stool studies were negative at that time. He was discharged home 9/18 on Zosyn via LUE PICC until 9/24.  Family reports he was doing well until the day of admission when he started to have fevers up to 101.2F.     Workup in ED revealed leukocytosis to 16.95 and CT chest/abdomen/pelvis revealing ongoing inflammatory changes of the colon with associated foci of small volume pneumoperitoneum; no PVG or significant free peritoneal fluid. General Surgery asked to evaluate given these findings. Currently, patient is admitted to IM; he is hemodynamically stable.

## 2020-09-21 NOTE — PLAN OF CARE
Problem: Adult Inpatient Plan of Care  Goal: Plan of Care Review  Outcome: Ongoing, Progressing     Problem: Adult Inpatient Plan of Care  Goal: Optimal Comfort and Wellbeing  Outcome: Ongoing, Progressing   Pt is awake and alert. VSS,  Turned every two hours, frequent rounding performed,  all medication by Gtube and  and feds held.  Bed in lowest position bed alarm on, monitoring ongoing

## 2020-09-21 NOTE — PROGRESS NOTES
Pharmacokinetic Initial Assessment & Plan: IV Vancomycin    Intravenous vancomycin 1000 mg x 1 given @ 2016 on 09/20.  Continue vancomycin 750 mg IV every 12 hours .  Trough level 60 min prior to 4 th dose on 09/22 at approximately 0700. Desired empiric serum trough concentration is 15 to 20 mcg/mL    Pharmacy will continue to follow and monitor vancomycin.    Please contact pharmacy at extension [phone] with any questions regarding this assessment.     Thank you for the consult,   Martin Sahu       Patient brief summary:  Glen Moscoso is a 24 y.o. male initiated on antimicrobial therapy with IV Vancomycin for treatment of suspected bacteremia    Drug Allergies:   Review of patient's allergies indicates:  No Known Allergies    Actual Body Weight:   33.1 kg    Renal Function:   Estimated Creatinine Clearance: 108.5 mL/min (based on SCr of 0.5 mg/dL).,     CBC (last 72 hours):  Recent Labs   Lab Result Units 09/18/20 0445 09/20/20  1853   WBC K/uL 8.22 16.95*   Hemoglobin g/dL 9.0* 10.0*   Hematocrit % 29.3* 31.5*   Platelets K/uL 657* 535*   Gran% % 61.8 80.3*   Lymph% % 18.5 10.2*   Mono% % 13.9 9.0   Eosinophil% % 5.0 0.1   Basophil% % 0.4 0.2   Differential Method  Automated Automated       Metabolic Panel (last 72 hours):  Recent Labs   Lab Result Units 09/18/20 0445 09/20/20  1853   Sodium mmol/L 137 135*   Potassium mmol/L 3.5 3.1*   Chloride mmol/L 102 101   CO2 mmol/L 24 22*   Glucose mg/dL 84 99   BUN, Bld mg/dL 6 7   Creatinine mg/dL 0.5 0.5   Albumin g/dL  --  2.2*   Total Bilirubin mg/dL  --  0.2   Alkaline Phosphatase U/L  --  108   AST U/L  --  26   ALT U/L  --  24   Magnesium mg/dL  --  2.0   Phosphorus mg/dL  --  2.4*       Drug levels (last 3 results):  No results for input(s): VANCOMYCINRA, VANCOMYCINPE, VANCOMYCINTR in the last 72 hours.    Microbiologic Results:  Microbiology Results (last 7 days)     Procedure Component Value Units Date/Time    Blood culture x two cultures. Draw prior to  antibiotics. [[id number]] Collected: 09/20/20 1853    Order Status: Sent Specimen: Blood from Peripheral, Hand, Right Updated: 09/20/20 1910    Blood culture x two cultures. Draw prior to antibiotics. [[id number]] Collected: 09/20/20 1854    Order Status: Sent Specimen: Blood from Peripheral, Hand, Right Updated: 09/20/20 1905

## 2020-09-21 NOTE — ED NOTES
Glen Moscoso, a 24 y.o. male presents to the ED via EMS from home with CC found by home health to have rectal temp of 103.  Patient was discharged on 9/18.  Has continious zosyn for infection.  Patient has CP, he has bilateral ischium stage IV.  He has PIEG tube.  He is non verbal.        Patient identifiers verified verbally with patient and correct for Glen Moscoso.    SKIN: Skin is warm dry and intact, and color is consistent with ethnicity. Capillary refill <3 seconds. Stage IV to bilateral ischum Mucus membranes are dry, acyanotic.  RESPIRATORY: Airway is open and patent. Respirations-spontaneous, unlabored, regular rate, equal bilaterally on inspiration and expiration. No accessory muscle use noted. Lungs clear to auscultation in all fields bilaterally anterior and posterior.   CARDIAC: Patient has regular heart rate.  No peripheral edema noted, and patient has no c/o chest pain. Peripheral pulses present equal and strong throughout.  ABDOMEN: Soft and non-tender to palpation with no distention noted. Normoactive bowel sounds x4 quadrants. Feeding tube to abdomen.   NEUROLOGIC: Eyes open spontaneously and facial expression symmetrical. Patient is non verbal.  MUSCULOSKELETAL:  contractu red arms and legs.  Non-ambulatory.   : No complaints of frequency, burning, urgency or blood in the urine. Complaints of incontinence.

## 2020-09-21 NOTE — CONSULTS
[facility]  General Surgery  Consult Note    Patient Name: Glen Moscoso  MRN: [id number]  Code Status: Prior  Admission Date: 9/20/2020  Hospital Length of Stay: 1 days  Attending Physician: Christiano Rainey MD  Primary Care Provider: Yadi Lawson MD    Patient information was obtained from parent, past medical records and ER records.     Inpatient consult to General Surgery  Consult performed by: Sheldon Gonzalez MD  Consult ordered by: Rosalie Parikh MD        Subjective:     Principal Problem: Sepsis    History of Present Illness: Mr. Glen Moscoso is a 24 y.o. male with CP, complicated by seizures who presented to the ER for evaluation of fever.  He just had a prolonged and complex hospital stay [place] [facility] [place]9/18 for seizures, pneumonia, and proctocolitis. He was originally admitted to [facility], where he was found to have Pseudomonas and Serratia pneumonia, as well as a decubitus ulcer that was debrided by Gen Surg on 9/7. He underwent flex sig on 9/9 by GI - biopsies revealed non-specific inflammatory changes; stool studies were negative at that time. He was discharged home 9/18 on Zosyn via LUE PICC until 9/24.  Family reports he was doing well until the day of admission when he started to have fevers up to 101.2F.     Workup in ED revealed leukocytosis to 16.95 and CT chest/abdomen/pelvis revealing ongoing inflammatory changes of the colon with associated foci of small volume pneumoperitoneum; no PVG or significant free peritoneal fluid. General Surgery asked to evaluate given these findings. Currently, patient is admitted to IM; he is hemodynamically stable.     No current facility-administered medications on file prior to encounter.      Current Outpatient Medications on File Prior to Encounter   Medication Sig    ascorbic acid, vitamin C, (VITAMIN C) 500 MG tablet 1 tablet (500 mg total) by Per G Tube route once daily.    baclofen (LIORESAL) 10 MG tablet 1 tablet (10 mg total)  by Per G Tube route 3 (three) times daily.    cholestyramine (QUESTRAN) 4 gram packet Take 1 packet (4 g total) by mouth 2 (two) times daily.    diphenhydrAMINE (BENADRYL) 12.5 mg/5 mL elixir 10 mLs (25 mg total) by Per G Tube route 4 (four) times daily as needed for Allergies.    gabapentin (NEURONTIN) 250 mg/5 mL solution 5 mLs (250 mg total) by Per G Tube route nightly. At bedtime    ibuprofen (ADVIL,MOTRIN) 100 mg/5 mL suspension 10 mLs (200 mg total) by Per G Tube route every 6 (six) hours as needed for Pain or Temperature greater than.    Lactobacillus rhamnosus GG (CULTURELLE) 10 billion cell capsule 1 capsule by Per G Tube route once daily.    loperamide (IMODIUM) 1 mg/7.5 mL solution 15 mLs (2 mg total) by Per G Tube route 4 (four) times daily. for 10 days    multivitamin liquid no.118 Liqd 10 mLs by Per G Tube route once daily.    nystatin (MYCOSTATIN) cream Apply topically 2 (two) times daily.    PHENobarbitaL 20 mg/5 mL (4 mg/mL) Elix elixir Take 25 mLs (100 mg total) by mouth every evening.    piperacillin sodium/tazobactam (PIPERACILLIN-TAZOBACTAM 4.5G/100ML SODIUM CHLORIDE 0.9%-READY TO MIX) Inject 300 mLs (13.5 g total) into the vein continuous. End date 9/24/2020. for 9 days    propranoloL (INDERAL) 20 MG tablet 1 tablet (20 mg total) by Per G Tube route 3 (three) times daily.       Review of patient's allergies indicates:  No Known Allergies    Past Medical History:   Diagnosis Date    Acid reflux     Cerebral palsy     History of hip surgery     S/P percutaneous endoscopic gastrostomy (PEG) tube placement     Seizures      Past Surgical History:   Procedure Laterality Date    CHOLECYSTECTOMY      FLEXIBLE SIGMOIDOSCOPY N/A 9/9/2020    Procedure: SIGMOIDOSCOPY, FLEXIBLE;  Surgeon: America Goode MD;  Location: [address];  Service: Endoscopy;  Laterality: N/A;    HIP SURGERY       Family History     Problem Relation (Age of Onset)    Cancer Maternal Grandfather         Tobacco Use    Smoking status: Never Smoker    Smokeless tobacco: Never Used   Substance and Sexual Activity    Alcohol use: Never     Frequency: Never    Drug use: Not Currently    Sexual activity: Not on file     Review of Systems   Reason unable to perform ROS: non-verbal.     Objective:     Vital Signs (Most Recent):  Temp: 99.8 °F (37.7 °C) (09/20/20 2323)  Pulse: 110 (09/20/20 2350)  Resp: 20 (09/20/20 2350)  BP: 135/66 (09/20/20 2350)  SpO2: 97 % (09/20/20 2350) Vital Signs (24h Range):  Temp:  [99.8 °F (37.7 °C)-101.2 °F (38.4 °C)] 99.8 °F (37.7 °C)  Pulse:  [110-112] 110  Resp:  [20-24] 20  SpO2:  [97 %-98 %] 97 %  BP: (111-135)/(66-75) 135/66     Weight: 33.1 kg (73 lb)  Body mass index is 15.8 kg/m².    Physical Exam  Constitutional:       General: He is awake. He is not in acute distress.  HENT:      Head: Normocephalic and atraumatic.   Eyes:      Conjunctiva/sclera: Conjunctivae normal.   Cardiovascular:      Rate and Rhythm: Regular rhythm. Tachycardia present.   Pulmonary:      Effort: Pulmonary effort is normal. No respiratory distress.   Abdominal:      General: There is distension.      Palpations: Abdomen is soft.      Tenderness: There is abdominal tenderness (diffuse, grimaces to palpation).      Comments: KEYSHAWN-KEY tube in place; healed transverse laparotomy incision   Musculoskeletal:         General: Deformity present.   Skin:     General: Skin is warm and dry.   Neurological:      Mental Status: He is easily aroused. Mental status is at baseline.         Significant Labs:  CBC:   Recent Labs   Lab 09/20/20 1853   WBC 16.95*   RBC 3.27*   HGB 10.0*   HCT 31.5*   *   MCV 96   MCH 30.6   MCHC 31.7*     CMP:   Recent Labs   Lab 09/20/20 1853   GLU 99   CALCIUM 8.3*   ALBUMIN 2.2*   PROT 6.9   *   K 3.1*   CO2 22*      BUN 7   CREATININE 0.5   ALKPHOS 108   ALT 24   AST 26   BILITOT 0.2     Lactate 0.8    Significant Diagnostics:  CT reviewed - findings as  above.    Assessment/Plan:     Pneumoperitoneum  23 yo non-verbal M with CP and poor functional status with recent PNA and proctocolitis; now with fevers, leukocytosis, and imaging findings consistent with ongoing colonic inflammation and small volume pneumoperitoneum.     - Suspect that this finding is related to a small colonic perforation. Given the patient's comorbid conditions and poor functional status, he is certainly at high surgical risk. Given patient's current clinical stability, would recommend holding off on aggressive surgical intervention at this time and continue to manage conservatively with ongoing fluid resuscitation and antibiosis.   - Surgery will follow patient closely for any signs of clinical decline that may ultimately require surgical intervention.   - Discussed case with patient's mother over the phone who voiced understanding and acceptance of the above plan.   - Please contact Surgery with any significant clinical changes or evidence of clinical decline.   - D/w staff on call.       VTE Risk Mitigation (From admission, onward)         Ordered     enoxaparin injection 30 mg  Every 24 hours      09/20/20 2201                Thank you for your consult. I will follow-up with patient. Please contact us if you have any additional questions.    Sheldon Gonzalez MD  General Surgery  [facility]

## 2020-09-22 LAB
ALBUMIN SERPL BCP-MCNC: 1.7 G/DL (ref 3.5–5.2)
ALP SERPL-CCNC: 85 U/L (ref 55–135)
ALT SERPL W/O P-5'-P-CCNC: 16 U/L (ref 10–44)
ANION GAP SERPL CALC-SCNC: 10 MMOL/L (ref 8–16)
AST SERPL-CCNC: 12 U/L (ref 10–40)
BACTERIA #/AREA URNS AUTO: NORMAL /HPF
BASOPHILS # BLD AUTO: 0.02 K/UL (ref 0–0.2)
BASOPHILS NFR BLD: 0.2 % (ref 0–1.9)
BILIRUB SERPL-MCNC: 0.1 MG/DL (ref 0.1–1)
BILIRUB UR QL STRIP: NEGATIVE
BUN SERPL-MCNC: 5 MG/DL (ref 6–20)
CALCIUM SERPL-MCNC: 8.1 MG/DL (ref 8.7–10.5)
CHLORIDE SERPL-SCNC: 106 MMOL/L (ref 95–110)
CLARITY UR REFRACT.AUTO: ABNORMAL
CO2 SERPL-SCNC: 19 MMOL/L (ref 23–29)
COLOR UR AUTO: YELLOW
CREAT SERPL-MCNC: 0.4 MG/DL (ref 0.5–1.4)
DIFFERENTIAL METHOD: ABNORMAL
EOSINOPHIL # BLD AUTO: 0 K/UL (ref 0–0.5)
EOSINOPHIL NFR BLD: 0.1 % (ref 0–8)
ERYTHROCYTE [DISTWIDTH] IN BLOOD BY AUTOMATED COUNT: 15.2 % (ref 11.5–14.5)
EST. GFR  (AFRICAN AMERICAN): >60 ML/MIN/1.73 M^2
EST. GFR  (NON AFRICAN AMERICAN): >60 ML/MIN/1.73 M^2
GLUCOSE SERPL-MCNC: 77 MG/DL (ref 70–110)
GLUCOSE UR QL STRIP: NEGATIVE
HCT VFR BLD AUTO: 29 % (ref 40–54)
HGB BLD-MCNC: 9.1 G/DL (ref 14–18)
HGB UR QL STRIP: NEGATIVE
HYALINE CASTS UR QL AUTO: 1 /LPF
IMM GRANULOCYTES # BLD AUTO: 0.02 K/UL (ref 0–0.04)
IMM GRANULOCYTES NFR BLD AUTO: 0.2 % (ref 0–0.5)
KETONES UR QL STRIP: ABNORMAL
LEUKOCYTE ESTERASE UR QL STRIP: ABNORMAL
LYMPHOCYTES # BLD AUTO: 1.3 K/UL (ref 1–4.8)
LYMPHOCYTES NFR BLD: 14.1 % (ref 18–48)
MAGNESIUM SERPL-MCNC: 1.5 MG/DL (ref 1.6–2.6)
MCH RBC QN AUTO: 30.3 PG (ref 27–31)
MCHC RBC AUTO-ENTMCNC: 31.4 G/DL (ref 32–36)
MCV RBC AUTO: 97 FL (ref 82–98)
MICROSCOPIC COMMENT: NORMAL
MONOCYTES # BLD AUTO: 1.2 K/UL (ref 0.3–1)
MONOCYTES NFR BLD: 12.4 % (ref 4–15)
NEUTROPHILS # BLD AUTO: 6.9 K/UL (ref 1.8–7.7)
NEUTROPHILS NFR BLD: 73 % (ref 38–73)
NITRITE UR QL STRIP: NEGATIVE
NRBC BLD-RTO: 0 /100 WBC
PH UR STRIP: 5 [PH] (ref 5–8)
PHOSPHATE SERPL-MCNC: 2.7 MG/DL (ref 2.7–4.5)
PLATELET # BLD AUTO: 531 K/UL (ref 150–350)
PMV BLD AUTO: 9.5 FL (ref 9.2–12.9)
POTASSIUM SERPL-SCNC: 3.7 MMOL/L (ref 3.5–5.1)
PROT SERPL-MCNC: 5.7 G/DL (ref 6–8.4)
PROT UR QL STRIP: NEGATIVE
RBC # BLD AUTO: 3 M/UL (ref 4.6–6.2)
RBC #/AREA URNS AUTO: 1 /HPF (ref 0–4)
SODIUM SERPL-SCNC: 135 MMOL/L (ref 136–145)
SP GR UR STRIP: 1.02 (ref 1–1.03)
SQUAMOUS #/AREA URNS AUTO: 0 /HPF
URN SPEC COLLECT METH UR: ABNORMAL
VANCOMYCIN TROUGH SERPL-MCNC: 17 UG/ML (ref 10–22)
WBC # BLD AUTO: 9.49 K/UL (ref 3.9–12.7)
WBC #/AREA URNS AUTO: 4 /HPF (ref 0–5)

## 2020-09-22 PROCEDURE — 80053 COMPREHEN METABOLIC PANEL: CPT

## 2020-09-22 PROCEDURE — 20600001 HC STEP DOWN PRIVATE ROOM

## 2020-09-22 PROCEDURE — 97802 MEDICAL NUTRITION INDIV IN: CPT

## 2020-09-22 PROCEDURE — 36415 COLL VENOUS BLD VENIPUNCTURE: CPT

## 2020-09-22 PROCEDURE — 99231 SBSQ HOSP IP/OBS SF/LOW 25: CPT | Mod: ,,, | Performed by: SURGERY

## 2020-09-22 PROCEDURE — S0030 INJECTION, METRONIDAZOLE: HCPCS | Performed by: HOSPITALIST

## 2020-09-22 PROCEDURE — 80202 ASSAY OF VANCOMYCIN: CPT

## 2020-09-22 PROCEDURE — 85025 COMPLETE CBC W/AUTO DIFF WBC: CPT

## 2020-09-22 PROCEDURE — 99231 PR SUBSEQUENT HOSPITAL CARE,LEVL I: ICD-10-PCS | Mod: ,,, | Performed by: SURGERY

## 2020-09-22 PROCEDURE — 31720 CLEARANCE OF AIRWAYS: CPT

## 2020-09-22 PROCEDURE — 83735 ASSAY OF MAGNESIUM: CPT

## 2020-09-22 PROCEDURE — 63600175 PHARM REV CODE 636 W HCPCS: Performed by: HOSPITALIST

## 2020-09-22 PROCEDURE — 84100 ASSAY OF PHOSPHORUS: CPT

## 2020-09-22 PROCEDURE — 99233 PR SUBSEQUENT HOSPITAL CARE,LEVL III: ICD-10-PCS | Mod: ,,, | Performed by: PHYSICIAN ASSISTANT

## 2020-09-22 PROCEDURE — 81001 URINALYSIS AUTO W/SCOPE: CPT

## 2020-09-22 PROCEDURE — 25000003 PHARM REV CODE 250: Performed by: HOSPITALIST

## 2020-09-22 PROCEDURE — 94668 MNPJ CHEST WALL SBSQ: CPT

## 2020-09-22 PROCEDURE — 99232 PR SUBSEQUENT HOSPITAL CARE,LEVL II: ICD-10-PCS | Mod: ,,, | Performed by: INTERNAL MEDICINE

## 2020-09-22 PROCEDURE — 99232 SBSQ HOSP IP/OBS MODERATE 35: CPT | Mod: ,,, | Performed by: INTERNAL MEDICINE

## 2020-09-22 PROCEDURE — 99233 SBSQ HOSP IP/OBS HIGH 50: CPT | Mod: ,,, | Performed by: PHYSICIAN ASSISTANT

## 2020-09-22 PROCEDURE — 63600175 PHARM REV CODE 636 W HCPCS: Performed by: INTERNAL MEDICINE

## 2020-09-22 PROCEDURE — 99900035 HC TECH TIME PER 15 MIN (STAT)

## 2020-09-22 PROCEDURE — 94761 N-INVAS EAR/PLS OXIMETRY MLT: CPT

## 2020-09-22 RX ORDER — SODIUM CHLORIDE, SODIUM LACTATE, POTASSIUM CHLORIDE, CALCIUM CHLORIDE 600; 310; 30; 20 MG/100ML; MG/100ML; MG/100ML; MG/100ML
INJECTION, SOLUTION INTRAVENOUS CONTINUOUS
Status: ACTIVE | OUTPATIENT
Start: 2020-09-22 | End: 2020-09-23

## 2020-09-22 RX ORDER — MAGNESIUM SULFATE HEPTAHYDRATE 40 MG/ML
2 INJECTION, SOLUTION INTRAVENOUS ONCE
Status: COMPLETED | OUTPATIENT
Start: 2020-09-22 | End: 2020-09-22

## 2020-09-22 RX ADMIN — METRONIDAZOLE 500 MG: 500 INJECTION, SOLUTION INTRAVENOUS at 05:09

## 2020-09-22 RX ADMIN — PROPRANOLOL HYDROCHLORIDE 20 MG: 20 TABLET ORAL at 08:09

## 2020-09-22 RX ADMIN — GABAPENTIN 250 MG: 250 SOLUTION ORAL at 10:09

## 2020-09-22 RX ADMIN — METRONIDAZOLE 500 MG: 500 INJECTION, SOLUTION INTRAVENOUS at 08:09

## 2020-09-22 RX ADMIN — LOPERAMIDE HYDROCHLORIDE 2 MG: 1 SOLUTION ORAL at 09:09

## 2020-09-22 RX ADMIN — Medication 10 ML: at 08:09

## 2020-09-22 RX ADMIN — PHENOBARBITAL 100 MG: 20 ELIXIR ORAL at 10:09

## 2020-09-22 RX ADMIN — Medication 1 CAPSULE: at 08:09

## 2020-09-22 RX ADMIN — BACLOFEN 10 MG: 10 TABLET ORAL at 08:09

## 2020-09-22 RX ADMIN — PROPRANOLOL HYDROCHLORIDE 20 MG: 20 TABLET ORAL at 01:09

## 2020-09-22 RX ADMIN — BACLOFEN 10 MG: 10 TABLET ORAL at 01:09

## 2020-09-22 RX ADMIN — VANCOMYCIN HYDROCHLORIDE 750 MG: 750 INJECTION, POWDER, LYOPHILIZED, FOR SOLUTION INTRAVENOUS at 09:09

## 2020-09-22 RX ADMIN — CEFEPIME 2 G: 2 INJECTION, POWDER, FOR SOLUTION INTRAVENOUS at 09:09

## 2020-09-22 RX ADMIN — MAGNESIUM SULFATE IN WATER 2 G: 40 INJECTION, SOLUTION INTRAVENOUS at 01:09

## 2020-09-22 RX ADMIN — BACLOFEN 10 MG: 10 TABLET ORAL at 09:09

## 2020-09-22 RX ADMIN — Medication 500 MG: at 08:09

## 2020-09-22 RX ADMIN — LOPERAMIDE HYDROCHLORIDE 2 MG: 1 SOLUTION ORAL at 01:09

## 2020-09-22 RX ADMIN — METRONIDAZOLE 500 MG: 500 INJECTION, SOLUTION INTRAVENOUS at 01:09

## 2020-09-22 RX ADMIN — DIPHENHYDRAMINE HYDROCHLORIDE 12.5 MG: 25 SOLUTION ORAL at 10:09

## 2020-09-22 RX ADMIN — VANCOMYCIN HYDROCHLORIDE 750 MG: 750 INJECTION, POWDER, LYOPHILIZED, FOR SOLUTION INTRAVENOUS at 08:09

## 2020-09-22 RX ADMIN — ENOXAPARIN SODIUM 30 MG: 30 INJECTION SUBCUTANEOUS at 05:09

## 2020-09-22 RX ADMIN — PROPRANOLOL HYDROCHLORIDE 20 MG: 20 TABLET ORAL at 09:09

## 2020-09-22 RX ADMIN — LOPERAMIDE HYDROCHLORIDE 2 MG: 1 SOLUTION ORAL at 08:09

## 2020-09-22 RX ADMIN — LOPERAMIDE HYDROCHLORIDE 2 MG: 1 SOLUTION ORAL at 05:09

## 2020-09-22 RX ADMIN — SODIUM CHLORIDE, SODIUM LACTATE, POTASSIUM CHLORIDE, AND CALCIUM CHLORIDE: .6; .31; .03; .02 INJECTION, SOLUTION INTRAVENOUS at 01:09

## 2020-09-22 NOTE — SUBJECTIVE & OBJECTIVE
Interval History: Afebrile, vitals stable. No signs that patient is in pain with deep abdominal palpation. Not peritoneal. Having bowel movements. AM labs pending, but no leukocytosis yesterday.    Medications:  Continuous Infusions:  Scheduled Meds:   ascorbic acid (vitamin C)  500 mg Per G Tube Daily    baclofen  10 mg Per G Tube TID    ceFEPime (MAXIPIME) IVPB  2 g Intravenous Q12H    diphenhydrAMINE  12.5 mg Per G Tube QHS    enoxaparin  30 mg Subcutaneous Q24H    gabapentin  250 mg Per G Tube QHS    Lactobacillus rhamnosus GG  1 capsule Per G Tube Daily    loperamide  2 mg Per G Tube QID    metronidazole  500 mg Intravenous Q8H    multivitamin liquid no.118  10 mL Per G Tube Daily    PHENobarbitaL  100 mg Per G Tube QHS    potassium, sodium phosphates  1 packet Per G Tube Once    propranoloL  20 mg Per G Tube TID    vancomycin (VANCOCIN) IVPB  750 mg Intravenous Q12H     PRN Meds:acetaminophen, albuterol-ipratropium, bisacodyL, dextrose 50%, dextrose 50%, glucagon (human recombinant), glucose, glucose, melatonin, ondansetron, promethazine (PHENERGAN) IVPB, sodium chloride 0.9%     Review of patient's allergies indicates:  No Known Allergies  Objective:     Vital Signs (Most Recent):  Temp: 99 °F (37.2 °C) (09/22/20 0826)  Pulse: 104 (09/22/20 0826)  Resp: 20 (09/22/20 0826)  BP: 121/65 (09/22/20 0826)  SpO2: 95 % (09/22/20 0826) Vital Signs (24h Range):  Temp:  [97.8 °F (36.6 °C)-100.2 °F (37.9 °C)] 99 °F (37.2 °C)  Pulse:  [] 104  Resp:  [18-20] 20  SpO2:  [95 %-99 %] 95 %  BP: (121-132)/(65-78) 121/65     Weight: 33.1 kg (72 lb 15.6 oz)  Body mass index is 15.79 kg/m².    Intake/Output - Last 3 Shifts       09/20 0700 - 09/21 0659 09/21 0700 - 09/22 0659 09/22 0700 - 09/23 0659    P.O.  0 0    Total Intake(mL/kg)  0 (0) 0 (0)    Urine (mL/kg/hr)  250 (0.3)     Stool  40     Total Output  290     Net  -290 0           Urine Occurrence  4 x     Stool Occurrence 0 x 5 x           Physical  Exam  Constitutional:       General: He is awake. He is not in acute distress.  HENT:      Head: Normocephalic and atraumatic.   Eyes:      Conjunctiva/sclera: Conjunctivae normal.   Cardiovascular:      Rate and Rhythm: Normal rate and regular rhythm.   Pulmonary:      Effort: Pulmonary effort is normal. No respiratory distress.   Abdominal:      General: There is no distension.      Palpations: Abdomen is soft.      Tenderness: There is no abdominal tenderness (no longer grimaces to palpation ).      Comments: KEYSHAWN-KEY tube in place; healed transverse laparotomy incision   Musculoskeletal:         General: Deformity present.   Skin:     General: Skin is warm and dry.   Neurological:      Mental Status: He is easily aroused. Mental status is at baseline.         Significant Labs:  CBC:   Recent Labs   Lab 09/21/20  1344   WBC 11.12   RBC 3.00*   HGB 9.0*   HCT 29.1*   *   MCV 97   MCH 30.0   MCHC 30.9*     CMP:   Recent Labs   Lab 09/22/20  0748   GLU 77   CALCIUM 8.1*   ALBUMIN 1.7*   PROT 5.7*   *   K 3.7   CO2 19*      BUN 5*   CREATININE 0.4*   ALKPHOS 85   ALT 16   AST 12   BILITOT 0.1       Significant Diagnostics:  I have reviewed all pertinent imaging results/findings within the past 24 hours.

## 2020-09-22 NOTE — PROGRESS NOTES
Hospital Medicine  Progress Note      Patient Name: Glen Moscoso  MRN: [id number]  Date of Admission: 9/20/2020     Principal Problem: Sepsis     Subjective     Mr. Moscoso resting comfortably again today, tracking a bit more actively than yesterday.    He remained hemodynamically stable overnight with an unchanged abdominal exam and improving labs. Discussed with gen surg and will continue broad IV antibiotics and bowel rest, anticipating starting trickle feeds tomorrow AM    Review of Systems    Unable to obtain due to patient's mental status    Medications  Scheduled Meds:   ascorbic acid (vitamin C)  500 mg Per G Tube Daily    baclofen  10 mg Per G Tube TID    ceFEPime (MAXIPIME) IVPB  2 g Intravenous Q12H    diphenhydrAMINE  12.5 mg Per G Tube QHS    enoxaparin  30 mg Subcutaneous Q24H    gabapentin  250 mg Per G Tube QHS    Lactobacillus rhamnosus GG  1 capsule Per G Tube Daily    loperamide  2 mg Per G Tube QID    metronidazole  500 mg Intravenous Q8H    multivitamin liquid no.118  10 mL Per G Tube Daily    PHENobarbitaL  100 mg Per G Tube QHS    propranoloL  20 mg Per G Tube TID    vancomycin (VANCOCIN) IVPB  750 mg Intravenous Q12H     Continuous Infusions:   lactated ringers 50 mL/hr at 09/22/20 1358     PRN Meds:.acetaminophen, albuterol-ipratropium, bisacodyL, dextrose 50%, dextrose 50%, glucagon (human recombinant), glucose, glucose, melatonin, ondansetron, promethazine (PHENERGAN) IVPB, sodium chloride 0.9%    Objective    Physical Examination    Temp:  [97.8 °F (36.6 °C)-99.8 °F (37.7 °C)]   Pulse:  []   Resp:  [18-20]   BP: (121-128)/(65-76)   SpO2:  [95 %-100 %]     Gen: NAD, appears comfortable, frail and chronically ill appearing  Eyes: strabismus  CV: RRR, no M/R/G, no peripheral edema  Resp: Coarse breath sounds, no crackles, no increased work of breathing on room air  GI: Soft, NT, ND, +BS, PEG c/d/i at insertion site  Ext: contracted extremities  Neuro: tracks, does not follow  commands    CBC  Recent Labs   Lab 09/21/20  0500 09/21/20  1344 09/22/20  0748   WBC 11.77 11.12 9.49   HGB 9.2* 9.0* 9.1*   HCT 30.0* 29.1* 29.0*   * 472* 531*     CMP  Recent Labs   Lab 09/20/20  1853 09/21/20  0501 09/22/20  0748   * 136 135*   K 3.1* 3.3* 3.7    105 106   CO2 22* 20* 19*   BUN 7 6 5*   CREATININE 0.5 0.5 0.4*   GLU 99 88 77   CALCIUM 8.3* 8.2* 8.1*   MG 2.0 1.8 1.5*   PHOS 2.4* 2.7 2.7   ALKPHOS 108 96 85   ALT 24 20 16   AST 26 20 12   ALBUMIN 2.2* 1.9* 1.7*   PROT 6.9 6.3 5.7*   BILITOT 0.2 0.1 0.1       Hospital Course:    Mr. Moscoos presented with fever and was admitted with sepsis on 9/20. He was started on broad-spectrum antibiotics with vanc + cefepime + flagyl, and a CT C/A/P was obtained which was most significant for a small amount of free air. General surgery were consulted who suspected a small perforation. His hemodynamic status improved with IVF resuscitation and he has remained stable with cultures pending    Assessment and Plan:    Sepsis  Fever  Leukocytosis    · Sepsis improving, source suspected to be intra-abdominal  · Hemodynamic status and fever curve improving  · Leukocytosis resolved  · Had been on Zosyn outpatient for recently diagnosed Pseudomonas and Serratia pneumonia with end date of 9/24  · PICC placed 9/18  · Blood cultures NGTD  · Continue vanc + cefepime + flagyl. Appreciate ID and GS assistance    Pneumoperitoneum    - Gen surg following recommending conservative management, discussed again on 9/22  - Hemodynamically stable with improving sepsis, high risk surgery if indicated  - Continue broad spectrum ABx  - Will plan on trickle feeds tomorrow AM     Pneumonia 2/2 Pseudomonas and Serratia    · Stable  · Coarse breath sounds on exam, still not requiring O2  · Family reports rhonchi at baseline  · Management as above     Decubitus Ulcer of Ischial Area, Stage4  Pressure Injury of Right Ischium, Stage4    · Stable  · S/p debridement with Gen  Surg 9/7  · ESR 90, Procal 0.56, CRP pending  · CT pelvis as above  · Continue Vitamin C daily  · Low air mattress  · Turn q2h     Proctocolitis    · From previous admission  · Has been on Zosyn as above, now broadened  · General surgery following     Cerebral Palsy  · Chronic issue  · Continue Baclofen 10mg TID     Seizures Disorder  · Chronic issue  · Continue Phenobarbital 100mg qHS     Severe Protein Calorie Malnutrition    - Dietary consult for TF - Hold for now with pneumoperitoneum, likely to resume tomorrow with trickle    Hypokalemia  Hypophosphatemia  · Improved  · K and Phos daily to follow    Stage IV pressure ulcer    - POA  - Wound care following, report that it looks improved relative to prior (patient is well known to their team)  - No surrounding signs of infection  - Appreciate assistance    Diet: NPO  VTE PPX: Enoxaparin  Goals of care: Curative, full code    Christiano Rainey M.D.  Department of Hospital Medicine  [facility]  [phone] (pager)

## 2020-09-22 NOTE — PROGRESS NOTES
Pharmacokinetic Assessment Follow Up: IV Vancomycin    Vancomycin serum concentration assessment(s):    · Vancomycin trough-17 mcg/mL, drawn appropriately    Vancomycin Regimen Plan:    · Level within desired range of 15-20 mcg/mL, will continue current regimen.  · Next level on Thursday at 0700.  · Renal function stable.    Drug levels (last 3 results):  Recent Labs   Lab Result Units 09/22/20  0748   Vancomycin-Trough ug/mL 17.0       Pharmacy will continue to follow and monitor vancomycin.    Please contact pharmacy at extension [phone] for questions regarding this assessment.    Thank you for the consult,   Juliana Ingram, PharmD, [facility]  [phone]       Patient brief summary:  Glen Moscoso is a 24 y.o. male initiated on antimicrobial therapy with IV Vancomycin for treatment of intra-abdominal infection    The patient's current regimen is 750mg IV q12h.    Drug Allergies:   Review of patient's allergies indicates:  No Known Allergies    Actual Body Weight:   33kg    Renal Function:   Estimated Creatinine Clearance: 135.6 mL/min (A) (based on SCr of 0.4 mg/dL (L)).,       CBC (last 72 hours):  Recent Labs   Lab Result Units 09/20/20 1853 09/21/20  0500 09/21/20  1344   WBC K/uL 16.95* 11.77 11.12   Hemoglobin g/dL 10.0* 9.2* 9.0*   Hematocrit % 31.5* 30.0* 29.1*   Platelets K/uL 535* 521* 472*   Gran% % 80.3* 76.6* 71.4   Lymph% % 10.2* 12.6* 16.0*   Mono% % 9.0 10.3 11.8   Eosinophil% % 0.1 0.0 0.2   Basophil% % 0.2 0.2 0.2   Differential Method  Automated Automated Automated       Metabolic Panel (last 72 hours):  Recent Labs   Lab Result Units 09/20/20  1853 09/21/20  0501 09/22/20  0748   Sodium mmol/L 135* 136 135*   Potassium mmol/L 3.1* 3.3* 3.7   Chloride mmol/L 101 105 106   CO2 mmol/L 22* 20* 19*   Glucose mg/dL 99 88 77   BUN, Bld mg/dL 7 6 5*   Creatinine mg/dL 0.5 0.5 0.4*   Albumin g/dL 2.2* 1.9* 1.7*   Total Bilirubin mg/dL 0.2 0.1 0.1   Alkaline Phosphatase U/L 108 96 85   AST U/L 26 20 12   ALT U/L 24  20 16   Magnesium mg/dL 2.0 1.8 1.5*   Phosphorus mg/dL 2.4* 2.7 2.7       Vancomycin Administrations:  vancomycin given in the last 96 hours                   vancomycin 750 mg in dextrose 5 % 250 mL IVPB (ready to mix system) (mg) 750 mg New Bag 09/22/20 0822     750 mg New Bag 09/21/20 2236     750 mg New Bag  0804    vancomycin in dextrose 5 % 1 gram/250 mL IVPB 1,000 mg (mg) 1,000 mg New Bag 09/20/20 2016                Microbiologic Results:  Microbiology Results (last 7 days)     Procedure Component Value Units Date/Time    Blood culture x two cultures. Draw prior to antibiotics. [[id number]] Collected: 09/20/20 1853    Order Status: Completed Specimen: Blood from Peripheral, Hand, Right Updated: 09/21/20 2012     Blood Culture, Routine No Growth to date      No Growth to date    Narrative:      Aerobic and anaerobic    Blood culture x two cultures. Draw prior to antibiotics. [[id number]] Collected: 09/20/20 1854    Order Status: Completed Specimen: Blood from Peripheral, Hand, Right Updated: 09/21/20 2012     Blood Culture, Routine No Growth to date      No Growth to date    Narrative:      Aerobic and anaerobic

## 2020-09-22 NOTE — CONSULTS
"  [facility]  Adult Nutrition  Consult Note    SUMMARY     Recommendations    1. When medically feasible, initiate enteral nutrition. Rec'd Peptamen 1.5 Prebio @ 40 mL/hr to provide 1440 kcals, 65 g of protein, 739 mL fluid.   2. RD to monitor & follow-up.    Goals: Meet % EEN, EPN by RD f/u date  Nutrition Goal Status: new  Communication of RD Recs: reviewed with RN    Reason for Assessment    Reason For Assessment: consult  Diagnosis: other (see comments)(Sepsis)  Relevant Medical History: CP, PEG  Interdisciplinary Rounds: did not attend    General Information Comments: Pt aphasic w/ no family at bedside. PEG present, unsure of usual TF regimen (per previous RD note - 4 cans of Ensure/day). Upon previous hospital stay (discharged ), pt on Peptamen 1.5 Prebio @ 40 mL/hr & tolerating. NFPE complete  (updated today); pt meets criteria for moderate acute malnutrition. Please see PES statement for details. UBW ~ 85 kg per chart review. Wounds noted.  Nutrition Discharge Planning: Tolerance of TF    Nutrition/Diet History    Patient Reported Diet/Restrictions/Preferences: no oral intake  Factors Affecting Nutritional Intake: NPO    Anthropometrics    Temp: 98.8 °F (37.1 °C)  Height: 4' 9" (144.8 cm)  Height (inches): 57 in  Weight Method: Bed Scale  Weight: 33.1 kg (72 lb 15.6 oz)  Weight (lb): 72.97 lb  Ideal Body Weight (IBW), Male: 88 lb  % Ideal Body Weight, Male (lb): 82.92 %  BMI (Calculated): 15.8  BMI Grade: less than 16 protein-energy malnutrition grade III  Usual Body Weight (UBW), k.6 kg  % Usual Body Weight: 85.93  % Weight Change From Usual Weight: -14.25 %    Lab/Procedures/Meds    Pertinent Labs Reviewed: reviewed  Pertinent Labs Comments: Na 135  Pertinent Medications Reviewed: reviewed    Estimated/Assessed Needs    Weight Used For Calorie Calculations: 33.1 kg (72 lb 15.6 oz)     Energy Calorie Requirements (kcal): 1413 kcal/d  Energy Need Method: Pickett-St " Shellie(1.25 PAL)     Protein Requirements: 50-66 g/d (1.5-2 g/kg)  Weight Used For Protein Calculations: 33.1 kg (72 lb 15.6 oz)     Estimated Fluid Requirement Method: other (see comments)(Per MD or 1 mL/kcal)  RDA Method (mL): 1413    Nutrition Prescription Ordered    Current Diet Order: NPO    Evaluation of Received Nutrient/Fluid Intake    Comments: LBM: 9/21    Nutrition Risk    Level of Risk/Frequency of Follow-up: (2x/week)     Assessment and Plan    Moderate malnutrition    Nutrition Problem:  (Moderate/Severe) Protein-Calorie Malnutrition  Malnutrition in the context of Acute Illness/Injury    Related to (etiology):  Inability to consume sufficient energy    Signs and Symptoms (as evidenced by):  Body Fat Depletion: moderate depletion of orbitals, triceps and thoracic and lumbar region   Muscle Mass Depletion: moderate depletion of temples, clavicle region, scapular region and interosseous muscle   Weight Loss: 14% x 9 months    Interventions(treatment strategy):  Collaboration of nutrition care w/ other providers     Nutrition Diagnosis Status:  New     Monitor and Evaluation    Food and Nutrient Intake: enteral nutrition intake  Food and Nutrient Adminstration: enteral and parenteral nutrition administration  Physical Activity and Function: nutrition-related ADLs and IADLs  Anthropometric Measurements: weight, weight change  Biochemical Data, Medical Tests and Procedures: inflammatory profile, lipid profile, glucose/endocrine profile, gastrointestinal profile, electrolyte and renal panel  Nutrition-Focused Physical Findings: overall appearance     Malnutrition Assessment    Weight Loss (Malnutrition): greater than 10% in 6 months  Energy Intake (Malnutrition): other (see comments)(NICOLA)  Subcutaneous Fat (Malnutrition): moderate depletion  Muscle Mass (Malnutrition): moderate depletion   Orbital Region (Subcutaneous Fat Loss): moderate depletion  Upper Arm Region (Subcutaneous Fat Loss): moderate  depletion  Thoracic and Lumbar Region: moderate depletion   Sabianism Region (Muscle Loss): moderate depletion  Clavicle Bone Region (Muscle Loss): moderate depletion  Dorsal Hand (Muscle Loss): moderate depletion  Anterior Thigh Region (Muscle Loss): severe depletion  Posterior Calf Region (Muscle Loss): severe depletion     Nutrition Follow-Up    RD Follow-up?: Yes

## 2020-09-22 NOTE — PROGRESS NOTES
Hospital Medicine  Progress Note      Patient Name: Glen Moscoso  MRN: [id number]  Date of Admission: 9/20/2020     Principal Problem: Sepsis     Subjective     Mr. Moscoso resting comofrtably when I examined him late this morning. His RN was at bedside who reported he was more comfortable than earlier in the day.    CT A/P overnight with a small amount of free air    I discussed his case with general surgery who are recommending conservative management for now. I ordered a tube study to rule-out his PEG as a possible contributor to the air noted on the study which was normal.     Discussed with ID at bedside who are recommending continued broad IV antibiotics for now    Patient's mother updated on the phone this evening regarding the findings to date and the plan going forward.    Review of Systems    Unable to obtain due to patient's mental status    Medications  Scheduled Meds:   ascorbic acid (vitamin C)  500 mg Per G Tube Daily    baclofen  10 mg Per G Tube TID    ceFEPime (MAXIPIME) IVPB  2 g Intravenous Q12H    diphenhydrAMINE  12.5 mg Per G Tube QHS    enoxaparin  30 mg Subcutaneous Q24H    gabapentin  250 mg Per G Tube QHS    Lactobacillus rhamnosus GG  1 capsule Per G Tube Daily    loperamide  2 mg Per G Tube QID    metronidazole  500 mg Intravenous Q8H    multivitamin liquid no.118  10 mL Per G Tube Daily    PHENobarbitaL  100 mg Per G Tube QHS    potassium, sodium phosphates  1 packet Per G Tube Once    propranoloL  20 mg Per G Tube TID    vancomycin (VANCOCIN) IVPB  750 mg Intravenous Q12H     Continuous Infusions:   0/9% NACL & POTASSIUM CHLORIDE 20 MEQ/L 75 mL/hr at 09/21/20 1526     PRN Meds:.acetaminophen, albuterol-ipratropium, bisacodyL, dextrose 50%, dextrose 50%, glucagon (human recombinant), glucose, glucose, melatonin, ondansetron, promethazine (PHENERGAN) IVPB, sodium chloride 0.9%    Objective    Physical Examination    Temp:  [98.6 °F (37 °C)-100.2 °F (37.9 °C)]   Pulse:  []    Resp:  [18-20]   BP: (123-135)/(66-79)   SpO2:  [96 %-97 %]     Gen: NAD, appears comfortable, frail and chronically ill appearing  Eyes: strabismus  CV: RRR, no M/R/G, no peripheral edema  Resp: CTAB, no increased work of breathing on room air  GI: Soft, NT, ND, +BS, PEG c/d/i at insertion site  Ext: contracted extremities  Neuro: tracks, does not follow commands    CBC  Recent Labs   Lab 09/20/20  1853 09/21/20  0500 09/21/20  1344   WBC 16.95* 11.77 11.12   HGB 10.0* 9.2* 9.0*   HCT 31.5* 30.0* 29.1*   * 521* 472*     CMP  Recent Labs   Lab 09/18/20  0445 09/20/20  1853 09/21/20  0501    135* 136   K 3.5 3.1* 3.3*    101 105   CO2 24 22* 20*   BUN 6 7 6   CREATININE 0.5 0.5 0.5   GLU 84 99 88   CALCIUM 8.7 8.3* 8.2*   MG  --  2.0 1.8   PHOS  --  2.4* 2.7   ALKPHOS  --  108 96   ALT  --  24 20   AST  --  26 20   ALBUMIN  --  2.2* 1.9*   PROT  --  6.9 6.3   BILITOT  --  0.2 0.1       Hospital Course:    Mr. Moscoso presented with fever and was admitted with sepsis on 9/20. He was started on broad-spectrum antibiotics with vanc + cefepime + flagyl, and a CT C/A/P was obtained which was most significant for a small amount of free air. General surgery were consulted who suspected a small perforation. His hemodynamic status improved with IVF resuscitation and he has remained stable with cultures pending    Assessment and Plan:    Sepsis  Fever  Leukocytosis    · Hemodynamic status and fever curve improving  · Leukocytosis resolved  · Hadbeen on Zosyn outpatient for recently diagnosed Pseudomonas and Serratia pneumonia with end date of 9/24  · PICC placed 9/18  · Blood cultures NGTD  · Continue vanc + cefepime + flagyl. Appreciate ID and GS assistance    Pneumoperitoneum    - Gen surg following recommending conservative management  - Hemodynamically stable with improving sepsis, high risk surgery if indicated  - Continue broad spectrum ABx  - X-ray today following contrast administration inconsistent  with PEG as the source     Pneumonia 2/2 Pseudomonas and Serratia    · Stable  · Satting 98% on room air  · Family reports rhonchi at baseline  · Management as above     Decubitus Ulcer of Ischial Area, Stage4  Pressure Injury of Right Ischium, Stage4    · Stable  · S/p debridement with Gen Surg 9/7  · ESR 90, Procal 0.56, CRP pending  · CT pelvis as above  · Continue Vitamin C daily  · Low air mattress  · Turn q2h     Proctocolitis    · From previous admission  · Has been on Zosyn as above, now broadened  · General surgery following     Cerebral Palsy  · Chronic issue  · Continue Baclofen 10mg TID     Seizures Disorder  · Chronic issue  · Continue Phenobarbital 100mg qHS     Severe Protein Calorie Malnutrition    - Dietary consult for TF - Hold for now with pneumoperitoneum, likely to resume tomorrow    Hypokalemia  Hypophosphatemia  · Replaced (with continuous IVF)  · K and Phos daily to follow    Stage IV pressure ulcer    - POA  - Wound care consulted, report that it looks improved relative to prior (patient is well known to their team)  - No surrounding signs of infection  - Appreciate assistance    Diet: NPO  VTE PPX: Enoxaparin  Goals of care: Curative, full code    Christiano Rainey M.D.  Department of Hospital Medicine  [facility]  [phone] (pager)

## 2020-09-22 NOTE — ASSESSMENT & PLAN NOTE
Nutrition Problem:  (Moderate/Severe) Protein-Calorie Malnutrition  Malnutrition in the context of Acute Illness/Injury    Related to (etiology):  Inability to consume sufficient energy    Signs and Symptoms (as evidenced by):  Body Fat Depletion: moderate depletion of orbitals, triceps and thoracic and lumbar region   Muscle Mass Depletion: moderate depletion of temples, clavicle region, scapular region and interosseous muscle   Weight Loss: 14% x 9 months    Interventions(treatment strategy):  Collaboration of nutrition care w/ other providers     Nutrition Diagnosis Status:  New

## 2020-09-22 NOTE — SUBJECTIVE & OBJECTIVE
Interval History: No AEON.  Tmax 100.2, Tcurrent 99.  Blood cultures NGTD.  Gen Sx rec no intervention given comorbidities and is clinically stable.    Review of Systems   Unable to perform ROS: Patient nonverbal     Objective:     Vital Signs (Most Recent):  Temp: 99 °F (37.2 °C) (09/22/20 0826)  Pulse: 104 (09/22/20 0826)  Resp: 20 (09/22/20 0826)  BP: 121/65 (09/22/20 0826)  SpO2: 95 % (09/22/20 0826) Vital Signs (24h Range):  Temp:  [97.8 °F (36.6 °C)-100.2 °F (37.9 °C)] 99 °F (37.2 °C)  Pulse:  [] 104  Resp:  [18-20] 20  SpO2:  [95 %-99 %] 95 %  BP: (121-132)/(65-78) 121/65     Weight: 33.1 kg (72 lb 15.6 oz)  Body mass index is 15.79 kg/m².    Estimated Creatinine Clearance: 135.6 mL/min (A) (based on SCr of 0.4 mg/dL (L)).    Physical Exam  Constitutional:       General: He is awake. He is not in acute distress.      HENT:      Head: Normocephalic and atraumatic.   Eyes:      Conjunctiva/sclera: Conjunctivae normal.   Cardiovascular:      Rate and Rhythm: Normal rate and regular rhythm.   Pulmonary:      Effort: Pulmonary effort is normal. No respiratory distress.      Breath sounds: Rhonchi (ant exam upper lobes - increased today) present.   Abdominal:      General: There is no distension.      Palpations: Abdomen is soft.      Tenderness: There is no abdominal tenderness (no longer grimaces to palpation ).      Comments: KEYSHAWN-KEY tube in place; healed transverse laparotomy incision   Musculoskeletal:         General: Deformity present.   Skin:     General: Skin is warm and dry.   Neurological:      Mental Status: He is easily aroused. Mental status is at baseline.                 Significant Labs:   Blood Culture:   Recent Labs   Lab 09/01/20  0043 09/01/20  0057 09/16/20  0928 09/20/20  1853 09/20/20  1854   LABBLOO No growth after 5 days. No growth after 5 days. No growth after 5 days. No Growth to date  No Growth to date No Growth to date  No Growth to date     CBC:   Recent Labs   Lab  09/20/20  1853 09/21/20  0500 09/21/20  1344   WBC 16.95* 11.77 11.12   HGB 10.0* 9.2* 9.0*   HCT 31.5* 30.0* 29.1*   * 521* 472*     CMP:   Recent Labs   Lab 09/20/20  1853 09/21/20  0501 09/22/20  0748   * 136 135*   K 3.1* 3.3* 3.7    105 106   CO2 22* 20* 19*   GLU 99 88 77   BUN 7 6 5*   CREATININE 0.5 0.5 0.4*   CALCIUM 8.3* 8.2* 8.1*   PROT 6.9 6.3 5.7*   ALBUMIN 2.2* 1.9* 1.7*   BILITOT 0.2 0.1 0.1   ALKPHOS 108 96 85   AST 26 20 12   ALT 24 20 16   ANIONGAP 12 11 10   EGFRNONAA >60.0 >60.0 >60.0     Respiratory Culture:   Recent Labs   Lab 08/26/20  0505 08/31/20  2157 09/06/20  1103   GSRESP <10 epithelial cells per low power field.  Moderate WBC's  Many Gram negative rods  Many Gram negative diplococci <10 epithelial cells per low power field.  Rare WBC's  Rare Gram negative rods >10 epithelial cells per low power field  Rare WBC's  Rare Gram negative rods   RESPIRATORYC No S aureus isolated.  SERRATIA MARCESCENS  Moderate  Normal respiratory jose also present  *  PSEUDOMONAS AERUGINOSA  Moderate  * No S aureus or Pseudomonas isolated.  SERRATIA MARCESCENS  Moderate  * No S aureus or Pseudomonas isolated.  SERRATIA MARCESCENS  Moderate  *     All pertinent labs within the past 24 hours have been reviewed.    Significant Imaging: I have reviewed all pertinent imaging results/findings within the past 24 hours.   X-Ray Abdomen Portable [[id number]] Resulted: 09/21/20 1155   Order Status: Completed Updated: 09/21/20 1157   Narrative:     EXAMINATION:   XR ABDOMEN PORTABLE     CLINICAL HISTORY:   checking tube position, gastrograffin ordered;     TECHNIQUE:   Supine radiograph of the abdomen.     COMPARISON:   CT abdomen and pelvis 09/20/2020     FINDINGS:   Contrast administered via the patient's gastrostomy tube extends to the right of midline.  I suspect this opacifies the distal stomach given its configuration on yesterday CT.  Follow-up radiograph would be helpful to confirm  appropriate positioning.  No definite contrast extravasation along the course of the tubing.     Diffuse gaseous distention of small and large bowel loops.  Urinary bladder is distended with contrast.  Bladder is shifted to the right due to distended rectum.     Dysplastic hips.    Impression:       Contrast appears to opacify the distal stomach when compared to yesterday's CT.  No extravasation seen along the course of the tubing.  Follow-up radiograph can be obtained to ensure expected migration of contrast.     Diffuse gaseous distention of small and large bowel loops.       Electronically signed by: Quiana Méndez   Date: 09/21/2020   Time: 11:55   CT Chest Abdomen Pelvis With Contrast [[id number]] (Abnormal) Resulted: 09/20/20 2310   Order Status: Completed Updated: 09/20/20 2312   Narrative:     EXAMINATION:   CT CHEST ABDOMEN PELVIS WITH CONTRAST (XPD)     CLINICAL HISTORY:   Sepsis;Please include sacral decub;     TECHNIQUE:   Low dose axial images, sagittal and coronal reformations were obtained from the thoracic inlet to the pubic symphysis following the IV administration of 100 mL of Omnipaque 350 .  Oral contrast was not given.     COMPARISON:   CT chest abdomen pelvis, 09/04/2020.     FINDINGS:   Chest:     Evaluation is limited by extensive streak artifact due to the patient's arms overlying the field of view.     Heart size is normal.  No large or central pulmonary embolus.  Evaluation for small pulmonary emboli limited by motion degradation and extensive artifact.     No large consolidation.  No pleural fluid.  No pneumothorax.  There are mild retained secretions in the left mainstem bronchus.  Partial opacification of the left lower lobe bronchus, but no significant airspace disease in the left lower lobe.     Mild patulous appearance of the esophagus without focal wall thickening.     No bulky lymphadenopathy identified in the chest.     Abdomen:     Evaluation is limited by extensive streak  artifact due to the patient's arms overlying the field of view.     Liver is negative for acute finding.  Gallbladder appears surgically absent.  No intrahepatic biliary ductal dilatation.     Spleen, adrenals, and pancreas are negative for acute finding.     Kidneys enhance symmetrically.  No hydronephrosis.     Percutaneous gastrostomy tube is present.  There are no findings of small bowel obstruction.  There is diffuse wall thickening and mucosal edema involving the ascending colon.  Colon is moderately distended with air and liquid stool.  There is small volume of pneumoperitoneum, predominantly layering along the anterior aspect of the mid abdomen.  No portal venous gas.     No organized fluid collection.     No bulky lymphadenopathy.     Abdominal aorta is normal in caliber.  SMA and JAVIER are patent.     Pelvis:     Urinary bladder is distended but otherwise unremarkable.  There are mild inflammatory changes involving the rectosigmoid colon, though improved when compared with the prior study.  Mild presacral edema.  No significant pelvic free fluid.     Bones and soft tissues:     Evaluation of the pelvic soft tissues is limited by extensive artifact related to positioning of the patient's lower extremities.  Chronic dysmorphic and erosive changes involving the bilateral femora and acetabula, similar to prior CT mild diffuse body wall edema in the upper and mid abdomen.  Marked soft tissue edema involving the lower abdominal wall and hip soft tissues.  Bilateral ischial tuberosity pressure ulcers with overlying skin defects and bandage material.  No new focal area bony erosion.  No drainable fluid collection, allowing for artifact limitations.  Probable left-sided hydrocele, similar to prior CT dated 08/11/2020.    Impression:       New small volume pneumoperitoneum predominantly along the anterior aspect of the mid abdomen.  Findings could be secondary to perforated viscus or gastrostomy tube leak.  Suggest  "surgical evaluation as clinically appropriate.     Inflammatory changes and wall thickening involving the ascending and rectosigmoid colon suggestive of a nonspecific colitis.     Debris in the left mainstem and left lower lobe bronchus suggestive of retained secretions/mucus or aspiration.     Gaseous distention of bowel which could be related to ileus.     Chronic changes involving the pelvic bones.  Bilateral ischial tuberosity decubitus ulcers with overlying bandage material.  No drainable fluid collection.     This report was flagged in Epic as abnormal.     Critical finding was called to Rosalie Parikh MD by Sheldon Esquivel MD at 22:54 on 09/20/2020.       Electronically signed by: Sheldon Esquivel MD   Date: 09/20/2020   Time: 23:10   X-Ray Chest AP Portable [[id number]] Resulted: 09/20/20 2013   Order Status: Completed Updated: 09/20/20 2016   Narrative:     EXAMINATION:   XR CHEST AP PORTABLE     CLINICAL HISTORY:   Provided history is "Sepsis;  ".     TECHNIQUE:   One view of the chest.     COMPARISON:   09/01/2020.     FINDINGS:   Cardiac wires overlie the chest.  Cardiomediastinal silhouette is not enlarged.  No focal consolidation.  No sizable pleural effusion.  No pneumothorax.  Mild gaseous distention of bowel in the upper abdomen.    Impression:       No acute cardiopulmonary finding identified on this single view.       Electronically signed by: Sheldon Esquivel MD   Date: 09/20/2020   Time: 20:13   Imaging History    2020  Date Procedure Name Status Accession Number Location   09/21/20 11:00 AM X-Ray Abdomen Portable Final [id number] [facility]   09/20/20 10:21 PM CT Chest Abdomen Pelvis With Contrast Final [id number] [facility]   09/20/20 08:08 PM X-Ray Chest AP Portable Final [id number] [facility]   09/17/20 04:54 PM US Lower Extremity Veins Bilateral Final [id number] [facility]   09/09/20 08:43 AM US Abdomen Limited Final [id number] [facility]   09/04/20 05:42 PM CTA Chest Non Coronary Final [id number] [facility]   09/04/20 05:41 PM CT " Abdomen Pelvis With Contrast Final [id number] [facility]   09/01/20 11:47 AM US Lower Extremity Veins Bilateral Final [id number] [facility]   09/01/20 11:46 AM US Upper Extremity Veins Right Final [id number] [facility]   09/01/20 11:46 AM US Upper Extremity Veins Left Final [id number] [facility]   09/01/20 09:54 AM X-Ray Chest 1 View Final [id number] [facility]   08/30/20 11:25 AM X-Ray Chest 1 View Final [id number] [facility]   08/28/20 01:50 PM X-Ray Chest AP Portable Final [id number] [facility]   08/27/20 04:42 AM X-Ray Chest 1 View Final [id number] [facility]   08/26/20 10:30 AM X-Ray Abdomen AP 1 View (KUB) Final [id number] [facility]   08/26/20 08:28 AM X-Ray Abdomen AP 1 View (KUB) Final [id number] [facility]   08/26/20 03:30 AM X-Ray Chest 1 View Final [id number] [facility]   08/26/20 11:20 AM Echo Color Flow Doppler? Yes Final [id number] [facility]

## 2020-09-22 NOTE — PROGRESS NOTES
[facility]  General Surgery  Progress Note    Subjective:     History of Present Illness:  Mr. Glen Moscoso is a 24 y.o. male with CP, complicated by seizures who presented to the ER for evaluation of fever.  He just had a prolonged and complex hospital stay at [facility] from 8/26-9/18 for seizures, pneumonia, and proctocolitis. He was originally admitted to [facility], where he was found to have Pseudomonas and Serratia pneumonia, as well as a decubitus ulcer that was debrided by Gen Surg on 9/7. He underwent flex sig on 9/9 by GI - biopsies revealed non-specific inflammatory changes; stool studies were negative at that time. He was discharged home 9/18 on Zosyn via LUE PICC until 9/24.  Family reports he was doing well until the day of admission when he started to have fevers up to 101.2F.     Workup in ED revealed leukocytosis to 16.95 and CT chest/abdomen/pelvis revealing ongoing inflammatory changes of the colon with associated foci of small volume pneumoperitoneum; no PVG or significant free peritoneal fluid. General Surgery asked to evaluate given these findings. Currently, patient is admitted to IM; he is hemodynamically stable.     Post-Op Info:  * No surgery found *         Interval History: Afebrile, vitals stable. No signs that patient is in pain with deep abdominal palpation. Not peritoneal. Having bowel movements. AM labs pending, but no leukocytosis yesterday.    Medications:  Continuous Infusions:  Scheduled Meds:   ascorbic acid (vitamin C)  500 mg Per G Tube Daily    baclofen  10 mg Per G Tube TID    ceFEPime (MAXIPIME) IVPB  2 g Intravenous Q12H    diphenhydrAMINE  12.5 mg Per G Tube QHS    enoxaparin  30 mg Subcutaneous Q24H    gabapentin  250 mg Per G Tube QHS    Lactobacillus rhamnosus GG  1 capsule Per G Tube Daily    loperamide  2 mg Per G Tube QID    metronidazole  500 mg Intravenous Q8H    multivitamin liquid no.118  10 mL Per G Tube Daily    PHENobarbitaL  100 mg Per G  Tube QHS    potassium, sodium phosphates  1 packet Per G Tube Once    propranoloL  20 mg Per G Tube TID    vancomycin (VANCOCIN) IVPB  750 mg Intravenous Q12H     PRN Meds:acetaminophen, albuterol-ipratropium, bisacodyL, dextrose 50%, dextrose 50%, glucagon (human recombinant), glucose, glucose, melatonin, ondansetron, promethazine (PHENERGAN) IVPB, sodium chloride 0.9%     Review of patient's allergies indicates:  No Known Allergies  Objective:     Vital Signs (Most Recent):  Temp: 99 °F (37.2 °C) (09/22/20 0826)  Pulse: 104 (09/22/20 0826)  Resp: 20 (09/22/20 0826)  BP: 121/65 (09/22/20 0826)  SpO2: 95 % (09/22/20 0826) Vital Signs (24h Range):  Temp:  [97.8 °F (36.6 °C)-100.2 °F (37.9 °C)] 99 °F (37.2 °C)  Pulse:  [] 104  Resp:  [18-20] 20  SpO2:  [95 %-99 %] 95 %  BP: (121-132)/(65-78) 121/65     Weight: 33.1 kg (72 lb 15.6 oz)  Body mass index is 15.79 kg/m².    Intake/Output - Last 3 Shifts       09/20 0700 - 09/21 0659 09/21 0700 - 09/22 0659 09/22 0700 - 09/23 0659    P.O.  0 0    Total Intake(mL/kg)  0 (0) 0 (0)    Urine (mL/kg/hr)  250 (0.3)     Stool  40     Total Output  290     Net  -290 0           Urine Occurrence  4 x     Stool Occurrence 0 x 5 x           Physical Exam  Constitutional:       General: He is awake. He is not in acute distress.  HENT:      Head: Normocephalic and atraumatic.   Eyes:      Conjunctiva/sclera: Conjunctivae normal.   Cardiovascular:      Rate and Rhythm: Normal rate and regular rhythm.   Pulmonary:      Effort: Pulmonary effort is normal. No respiratory distress.   Abdominal:      General: There is no distension.      Palpations: Abdomen is soft.      Tenderness: There is no abdominal tenderness (no longer grimaces to palpation ).      Comments: KEYSHAWN-KEY tube in place; healed transverse laparotomy incision   Musculoskeletal:         General: Deformity present.   Skin:     General: Skin is warm and dry.   Neurological:      Mental Status: He is easily aroused.  Mental status is at baseline.         Significant Labs:  CBC:   Recent Labs   Lab 09/21/20  1344   WBC 11.12   RBC 3.00*   HGB 9.0*   HCT 29.1*   *   MCV 97   MCH 30.0   MCHC 30.9*     CMP:   Recent Labs   Lab 09/22/20  0748   GLU 77   CALCIUM 8.1*   ALBUMIN 1.7*   PROT 5.7*   *   K 3.7   CO2 19*      BUN 5*   CREATININE 0.4*   ALKPHOS 85   ALT 16   AST 12   BILITOT 0.1       Significant Diagnostics:  I have reviewed all pertinent imaging results/findings within the past 24 hours.    Assessment/Plan:     Pneumoperitoneum  23 yo non-verbal M with CP and poor functional status with recent PNA and proctocolitis; now with fevers, leukocytosis, and imaging findings consistent with ongoing colonic inflammation and small volume pneumoperitoneum.     - Suspect that this finding is related to a small colonic perforation. Given the patient's comorbid conditions and poor functional status, he is certainly at high surgical risk. Given patient's current clinical stability, would recommend holding off on aggressive surgical intervention at this time and continue to manage conservatively with ongoing fluid resuscitation and antibiosis.   - No surgical indications at this time. Patient clinically doing well.  - Please contact Surgery with any significant clinical changes or evidence of clinical decline.           Zoe Simms MD  General Surgery  [facility]

## 2020-09-22 NOTE — PLAN OF CARE
Patient turned q2 hours to offset pressure to sacral area, lung sound course/ rhonchi this AM and plan of care discussed with Dr. Rainey, new orders received, no acute distress noted during shift, ongoing assessment with call light in reach. Resp even and unlabored, patient with non productive cough. LR 50ml/hr infusing w/o difficulty.

## 2020-09-22 NOTE — PLAN OF CARE
Recommendations     1. When medically feasible, initiate enteral nutrition. Rec'd Peptamen 1.5 Prebio @ 40 mL/hr to provide 1440 kcals, 65 g of protein, 739 mL fluid.   2. RD to monitor & follow-up.     Goals: Meet % EEN, EPN by RD f/u date  Nutrition Goal Status: new  Communication of RD Recs: reviewed with RN

## 2020-09-22 NOTE — ASSESSMENT & PLAN NOTE
23 yo non-verbal M with CP and poor functional status with recent PNA and proctocolitis; now with fevers, leukocytosis, and imaging findings consistent with ongoing colonic inflammation and small volume pneumoperitoneum.     - Suspect that this finding is related to a small colonic perforation. Given the patient's comorbid conditions and poor functional status, he is certainly at high surgical risk. Given patient's current clinical stability, would recommend holding off on aggressive surgical intervention at this time and continue to manage conservatively with ongoing fluid resuscitation and antibiosis.   - No surgical indications at this time. Patient clinically doing well.  - Please contact Surgery with any significant clinical changes or evidence of clinical decline.

## 2020-09-22 NOTE — PLAN OF CARE
Patient is nonverbal and on room air O2 > 92%. No acute changes during shift. No signs of discomfort or pain. Wound care provided at 8 and 10 following incontinence episodes. Placed split gauze over PEG placement. Telesitter in room for safety. Was able to Facetime with mom at approximately 10pm and updated her with patient care. All safety precautions are in place. Will continue to monitor for safety and comfort.           Problem: Adult Inpatient Plan of Care  Goal: Plan of Care Review  Outcome: Ongoing, Progressing  Goal: Patient-Specific Goal (Individualization)  Outcome: Ongoing, Progressing  Goal: Absence of Hospital-Acquired Illness or Injury  Outcome: Ongoing, Progressing  Goal: Optimal Comfort and Wellbeing  Outcome: Ongoing, Progressing  Goal: Readiness for Transition of Care  Outcome: Ongoing, Progressing  Goal: Rounds/Family Conference  Outcome: Ongoing, Progressing     Problem: Adjustment to Illness (Sepsis/Septic Shock)  Goal: Optimal Coping  Outcome: Ongoing, Progressing     Problem: Bleeding (Sepsis/Septic Shock)  Goal: Absence of Bleeding  Outcome: Ongoing, Progressing     Problem: Glycemic Control Impaired (Sepsis/Septic Shock)  Goal: Blood Glucose Level Within Desired Range  Outcome: Ongoing, Progressing     Problem: Hemodynamic Instability (Sepsis/Septic Shock)  Goal: Effective Tissue Perfusion  Outcome: Ongoing, Progressing     Problem: Infection (Sepsis/Septic Shock)  Goal: Absence of Infection Signs/Symptoms  Outcome: Ongoing, Progressing     Problem: Nutrition Impaired (Sepsis/Septic Shock)  Goal: Optimal Nutrition Intake  Outcome: Ongoing, Progressing     Problem: Respiratory Compromise (Sepsis/Septic Shock)  Goal: Effective Oxygenation and Ventilation  Outcome: Ongoing, Progressing     Problem: Infection  Goal: Infection Symptom Resolution  Outcome: Ongoing, Progressing     Problem: Wound  Goal: Optimal Wound Healing  Outcome: Ongoing, Progressing     Problem: Skin Injury Risk  Increased  Goal: Skin Health and Integrity  Outcome: Ongoing, Progressing     Problem: Fall Injury Risk  Goal: Absence of Fall and Fall-Related Injury  Outcome: Ongoing, Progressing

## 2020-09-22 NOTE — PLAN OF CARE
ANGELO received a call from Dora at [facility] ([phone]). Dora informed this CM that she had some concerns about  receiving his IV antibiotics at home. She explained that when Rosa the  nurse went to the patient's home, it appeared that patient's mother was having difficulty administering patient's antibiotics. It was also noted that one of the patient's PCAs is unable to assist patient at this time so he has limited assistance at home. ANGELO/FELISHA will continue to assess patient's home situation.    Deepa Bowling RN  Ext [phone]

## 2020-09-23 ENCOUNTER — TELEPHONE (OUTPATIENT)
Dept: GASTROENTEROLOGY | Facility: CLINIC | Age: 25
End: 2020-09-23

## 2020-09-23 LAB
ALBUMIN SERPL BCP-MCNC: 1.8 G/DL (ref 3.5–5.2)
ALP SERPL-CCNC: 92 U/L (ref 55–135)
ALT SERPL W/O P-5'-P-CCNC: 13 U/L (ref 10–44)
ANION GAP SERPL CALC-SCNC: 15 MMOL/L (ref 8–16)
AST SERPL-CCNC: 12 U/L (ref 10–40)
BASOPHILS # BLD AUTO: 0.02 K/UL (ref 0–0.2)
BASOPHILS NFR BLD: 0.3 % (ref 0–1.9)
BILIRUB SERPL-MCNC: 0.1 MG/DL (ref 0.1–1)
BUN SERPL-MCNC: 4 MG/DL (ref 6–20)
CALCIUM SERPL-MCNC: 8.1 MG/DL (ref 8.7–10.5)
CHLORIDE SERPL-SCNC: 105 MMOL/L (ref 95–110)
CO2 SERPL-SCNC: 17 MMOL/L (ref 23–29)
CREAT SERPL-MCNC: 0.5 MG/DL (ref 0.5–1.4)
DIFFERENTIAL METHOD: ABNORMAL
EOSINOPHIL # BLD AUTO: 0 K/UL (ref 0–0.5)
EOSINOPHIL NFR BLD: 0.1 % (ref 0–8)
ERYTHROCYTE [DISTWIDTH] IN BLOOD BY AUTOMATED COUNT: 14.9 % (ref 11.5–14.5)
EST. GFR  (AFRICAN AMERICAN): >60 ML/MIN/1.73 M^2
EST. GFR  (NON AFRICAN AMERICAN): >60 ML/MIN/1.73 M^2
GLUCOSE SERPL-MCNC: 69 MG/DL (ref 70–110)
HCT VFR BLD AUTO: 32.1 % (ref 40–54)
HGB BLD-MCNC: 10.2 G/DL (ref 14–18)
IMM GRANULOCYTES # BLD AUTO: 0.01 K/UL (ref 0–0.04)
IMM GRANULOCYTES NFR BLD AUTO: 0.1 % (ref 0–0.5)
LYMPHOCYTES # BLD AUTO: 1.5 K/UL (ref 1–4.8)
LYMPHOCYTES NFR BLD: 19 % (ref 18–48)
MAGNESIUM SERPL-MCNC: 1.8 MG/DL (ref 1.6–2.6)
MCH RBC QN AUTO: 30.2 PG (ref 27–31)
MCHC RBC AUTO-ENTMCNC: 31.8 G/DL (ref 32–36)
MCV RBC AUTO: 95 FL (ref 82–98)
MONOCYTES # BLD AUTO: 0.9 K/UL (ref 0.3–1)
MONOCYTES NFR BLD: 11.5 % (ref 4–15)
NEUTROPHILS # BLD AUTO: 5.4 K/UL (ref 1.8–7.7)
NEUTROPHILS NFR BLD: 69 % (ref 38–73)
NRBC BLD-RTO: 0 /100 WBC
PHOSPHATE SERPL-MCNC: 2.6 MG/DL (ref 2.7–4.5)
PLATELET # BLD AUTO: 538 K/UL (ref 150–350)
PMV BLD AUTO: 9.8 FL (ref 9.2–12.9)
POTASSIUM SERPL-SCNC: 3.4 MMOL/L (ref 3.5–5.1)
PROT SERPL-MCNC: 6 G/DL (ref 6–8.4)
RBC # BLD AUTO: 3.38 M/UL (ref 4.6–6.2)
SODIUM SERPL-SCNC: 137 MMOL/L (ref 136–145)
WBC # BLD AUTO: 7.77 K/UL (ref 3.9–12.7)

## 2020-09-23 PROCEDURE — 94668 MNPJ CHEST WALL SBSQ: CPT

## 2020-09-23 PROCEDURE — 63600175 PHARM REV CODE 636 W HCPCS: Performed by: HOSPITALIST

## 2020-09-23 PROCEDURE — 99232 SBSQ HOSP IP/OBS MODERATE 35: CPT | Mod: ,,, | Performed by: INTERNAL MEDICINE

## 2020-09-23 PROCEDURE — 20600001 HC STEP DOWN PRIVATE ROOM

## 2020-09-23 PROCEDURE — 80053 COMPREHEN METABOLIC PANEL: CPT

## 2020-09-23 PROCEDURE — 84100 ASSAY OF PHOSPHORUS: CPT

## 2020-09-23 PROCEDURE — 36415 COLL VENOUS BLD VENIPUNCTURE: CPT

## 2020-09-23 PROCEDURE — 85025 COMPLETE CBC W/AUTO DIFF WBC: CPT

## 2020-09-23 PROCEDURE — 99232 PR SUBSEQUENT HOSPITAL CARE,LEVL II: ICD-10-PCS | Mod: ,,, | Performed by: INTERNAL MEDICINE

## 2020-09-23 PROCEDURE — 31720 CLEARANCE OF AIRWAYS: CPT

## 2020-09-23 PROCEDURE — 25000003 PHARM REV CODE 250: Performed by: HOSPITALIST

## 2020-09-23 PROCEDURE — S0030 INJECTION, METRONIDAZOLE: HCPCS | Performed by: HOSPITALIST

## 2020-09-23 PROCEDURE — 99900035 HC TECH TIME PER 15 MIN (STAT)

## 2020-09-23 PROCEDURE — 94761 N-INVAS EAR/PLS OXIMETRY MLT: CPT

## 2020-09-23 PROCEDURE — 25000003 PHARM REV CODE 250: Performed by: INTERNAL MEDICINE

## 2020-09-23 PROCEDURE — 83735 ASSAY OF MAGNESIUM: CPT

## 2020-09-23 RX ORDER — METRONIDAZOLE 500 MG/1
500 TABLET ORAL 3 TIMES DAILY
Status: DISCONTINUED | OUTPATIENT
Start: 2020-09-23 | End: 2020-09-23

## 2020-09-23 RX ORDER — METRONIDAZOLE 500 MG/1
500 TABLET ORAL 3 TIMES DAILY
Status: DISCONTINUED | OUTPATIENT
Start: 2020-09-23 | End: 2020-10-09 | Stop reason: HOSPADM

## 2020-09-23 RX ADMIN — ENOXAPARIN SODIUM 30 MG: 30 INJECTION SUBCUTANEOUS at 04:09

## 2020-09-23 RX ADMIN — METRONIDAZOLE 500 MG: 500 INJECTION, SOLUTION INTRAVENOUS at 02:09

## 2020-09-23 RX ADMIN — Medication 10 ML: at 08:09

## 2020-09-23 RX ADMIN — METRONIDAZOLE 500 MG: 500 TABLET ORAL at 08:09

## 2020-09-23 RX ADMIN — BACLOFEN 10 MG: 10 TABLET ORAL at 03:09

## 2020-09-23 RX ADMIN — PHENOBARBITAL 100 MG: 20 ELIXIR ORAL at 08:09

## 2020-09-23 RX ADMIN — PROPRANOLOL HYDROCHLORIDE 20 MG: 20 TABLET ORAL at 08:09

## 2020-09-23 RX ADMIN — PROPRANOLOL HYDROCHLORIDE 20 MG: 20 TABLET ORAL at 12:09

## 2020-09-23 RX ADMIN — BACLOFEN 10 MG: 10 TABLET ORAL at 08:09

## 2020-09-23 RX ADMIN — METRONIDAZOLE 500 MG: 500 TABLET ORAL at 04:09

## 2020-09-23 RX ADMIN — VANCOMYCIN HYDROCHLORIDE 750 MG: 750 INJECTION, POWDER, LYOPHILIZED, FOR SOLUTION INTRAVENOUS at 07:09

## 2020-09-23 RX ADMIN — Medication 1 CAPSULE: at 08:09

## 2020-09-23 RX ADMIN — Medication 500 MG: at 08:09

## 2020-09-23 RX ADMIN — CEFEPIME 2 G: 2 INJECTION, POWDER, FOR SOLUTION INTRAVENOUS at 10:09

## 2020-09-23 RX ADMIN — LOPERAMIDE HYDROCHLORIDE 2 MG: 1 SOLUTION ORAL at 04:09

## 2020-09-23 RX ADMIN — CEFEPIME 2 G: 2 INJECTION, POWDER, FOR SOLUTION INTRAVENOUS at 08:09

## 2020-09-23 RX ADMIN — DIPHENHYDRAMINE HYDROCHLORIDE 12.5 MG: 25 SOLUTION ORAL at 08:09

## 2020-09-23 RX ADMIN — METRONIDAZOLE 500 MG: 500 INJECTION, SOLUTION INTRAVENOUS at 08:09

## 2020-09-23 RX ADMIN — VANCOMYCIN HYDROCHLORIDE 750 MG: 750 INJECTION, POWDER, LYOPHILIZED, FOR SOLUTION INTRAVENOUS at 08:09

## 2020-09-23 RX ADMIN — GABAPENTIN 250 MG: 250 SOLUTION ORAL at 08:09

## 2020-09-23 RX ADMIN — LOPERAMIDE HYDROCHLORIDE 2 MG: 1 SOLUTION ORAL at 12:09

## 2020-09-23 RX ADMIN — ACETAMINOPHEN 650 MG: 325 TABLET ORAL at 11:09

## 2020-09-23 RX ADMIN — LOPERAMIDE HYDROCHLORIDE 2 MG: 1 SOLUTION ORAL at 08:09

## 2020-09-23 NOTE — ASSESSMENT & PLAN NOTE
- 25 yo male with CP with recent admit for respiratory failure and pseudomonas/serratia PNA, Ischial ulcers, chronic diarrhea s/p flex sig on last admit without significant findings completing 14d of zosyn for PNA and soft tissue wounds readmitted with fever.  - on Vanc cefepime and flagyl  - source likely from intestinal perforation - ? Secondary to flex sig? - gen sx monitoring without intervention    - blood cultures NGTD  - WBC and fever normalized - stable non septic  - wounds appear uninfected but now with exposed bone  - malnourished and will not heal with current ALB of 1.7  - on 9/24 will have completed 2 weeks IV abx    Plan:  1. Continue Vanc, cefepime and flagyl - will cover for prior PNA cx, wound osteo, and GI perf - plan for 4 weeks given osteomyelitis as has already completed 2 of zosyn  2. Monitor for worsening and if patient deteriorates, rec repeat CT ABD  3. Rec wound vacs for wounds as they likely will not heal - rec wound care eval for that  4. Needs aggressive nutrition intervention  5. Has midline and will need PICC as will need abx beyond the life of the current midline  6. Given the above, rec LTAC as patient needs aggressive wound care, nutrition intervention and IV ABX and ID consult.  7. Will need repeat CT abd in 2-3 weeks to ensure perforation has resolved  8. Plan for IV abx through 10/22  - Will sign off

## 2020-09-23 NOTE — PLAN OF CARE
No acute changes overnight. VSS. Hourly rounding performed. Telemetry in use. Pt resting. No needs at this time. Camera at bedside. Will continue to monitor.

## 2020-09-23 NOTE — PLAN OF CARE
Patient with HOB elevated at least 30 degrees, patient turned during shift and offloading from sacral area due to wounds. TF started at 10ml/hr, patient tolerating well, HCG bath given and pediatric gown applied. Resp even and unlabored, VSS and patient afebrile at this time. Call light within reach, alarms on and audible, frequent checks, ongoing assessment being made.

## 2020-09-23 NOTE — ASSESSMENT & PLAN NOTE
23 yo non-verbal M with CP and poor functional status with recent PNA and proctocolitis; now with fevers, leukocytosis, and imaging findings consistent with ongoing colonic inflammation and small volume pneumoperitoneum.     - Suspect that this finding is related to a small colonic perforation. Given the patient's comorbid conditions and poor functional status, he is certainly at high surgical risk. Given patient's current clinical stability, would recommend holding off on aggressive surgical intervention at this time and continue to manage conservatively with ongoing fluid resuscitation and antibiosis.   - No surgical indications at this time. Leukocytosis resolved. Patient clinically doing well.   -  General Surgery will sign off at this time. Please contact Surgery with any questions or concerns.

## 2020-09-23 NOTE — PROGRESS NOTES
[facility]  Infectious Disease  Progress Note    Patient Name: Glen Moscoso  MRN: [id number]  Admission Date: 9/20/2020  Length of Stay: 2 days  Attending Physician: Christiano Rainey MD  Primary Care Provider: Yadi Lawson MD    Isolation Status: No active isolations  Assessment/Plan:      Fever  - 25 yo male with CP with recent admit for respiratory failure and pseudomonas/serratia PNA, Ischial ulcers, chronic diarrhea s/p flex sig on last admit without significant findings completing 14d of zosyn for PNA and soft tissue wounds readmitted with fever.  - on Vanc cefepime and flagyl  - source likely from intestinal perforation - ? Secondary to flex sig? - gen sx monitoring without intervention    - blood cultures NGTD  - WBC and fever normalized - stable non septic  - wounds appear uninfected but now with exposed bone  - malnourished and will not heal with current ALB of 1.7  - on 9/24 will have completed 2 weeks IV abx    Plan:  1. Continue Vanc, cefepime and flagyl - will cover for prior PNA cx, wound osteo, and GI perf - plan for 4 weeks given osteomyelitis as has already completed 2 of zosyn  2. Monitor for worsening and if patient deteriorates, rec repeat CT ABD  3. Rec wound vacs for wounds as they likely will not heal - rec wound care eval for that  4. Needs aggressive nutrition intervention  5. Has midline and will need PICC as will need abx beyond the life of the current midline  6. Given the above, rec LTAC as patient needs aggressive wound care, nutrition intervention and IV ABX and ID consult.  7. Will need repeat CT abd in 2-3 weeks to ensure perforation has resolved  8. Plan for IV abx through 10/22  - Will sign off        Anticipated Disposition: tbd    Thank you for your consult. I will sign off. Please contact us if you have any additional questions.    JO Shin  Infectious Disease  [facility]    Subjective:     Principal Problem:Sepsis    HPI:  Mr. Glen Moscoso is a 24 y.o. male with CP, complicated by seizures and a peg tube, who presents to the ER for evaluation of fever.  He just had [place] and [place] [facility] [place] and pneumonia.  He was originally admitted to [facility] with respiratory failure, where he was found to have Pseudomonas and Serratia pneumonia, as well as a decubitus ulcer that was debrided by Gen Surg on 9/7 - cultures were not sent.  He had repeat resp cultures that showed Serratia.  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  Skeletal structures otherwise intact without evidence of erosive change. He was followed by ID.  He was discharged home 9/18 on Zosyn via LUE PICC until 9/24 (to complete 2 weeks).      Family at bedside on admission reported he was doing well until the day of admission when he started to have fevers up to 101.2F.  They denied any new respiratory complaints.  Given his recent hospitalization and fever while on antibiotics, they brought him to the ER for further evaluation.     Upon arrival to the ER, vitals were temp 101.2F, , RR 24 and /75.  Labs showed WBC 17 with ESR 90 and Procal 0.56.  CXR didn't show a new consolidation.  His case was discussed with ID on call, who suggested Vanc, Cefepime, and Flagyl based on his previous cultures, as well as CT chest/abdomen/pelvis to include sacral wounds.  He was admitted to Hospital Medicine for further management and ID now consulted for fever despite being on ABX and no clears source.    Interval History:  Patient has undergone a CT CAP and colitis, a small amount of pneumoperitoneum found likely from perforation, chronic changes of the pelvic bones, and mild retained secretions in the left mainstem bronchus with artial opacification of the left lower lobe bronchus, but  no significant airspace disease in the left lower lobe.  .  Primary team has G tube check and no extravasation seen and case discussed with Gen Tmaez who felt patient jhonathan had small perforation and would monitor clinically.  Febrile to 101.2 on admit and now afebrile.  WBC 16 on admit now 11. Blood cultures NGTD.      Interval History: No AEON.  Tmax 100.2, Tcurrent 99.  Blood cultures NGTD.  Gen Sx rec no intervention given comorbidities and is clinically stable.    Review of Systems   Unable to perform ROS: Patient nonverbal     Objective:     Vital Signs (Most Recent):  Temp: 99 °F (37.2 °C) (09/22/20 0826)  Pulse: 104 (09/22/20 0826)  Resp: 20 (09/22/20 0826)  BP: 121/65 (09/22/20 0826)  SpO2: 95 % (09/22/20 0826) Vital Signs (24h Range):  Temp:  [97.8 °F (36.6 °C)-100.2 °F (37.9 °C)] 99 °F (37.2 °C)  Pulse:  [] 104  Resp:  [18-20] 20  SpO2:  [95 %-99 %] 95 %  BP: (121-132)/(65-78) 121/65     Weight: 33.1 kg (72 lb 15.6 oz)  Body mass index is 15.79 kg/m².    Estimated Creatinine Clearance: 135.6 mL/min (A) (based on SCr of 0.4 mg/dL (L)).    Physical Exam  Constitutional:       General: He is awake. He is not in acute distress.      HENT:      Head: Normocephalic and atraumatic.   Eyes:      Conjunctiva/sclera: Conjunctivae normal.   Cardiovascular:      Rate and Rhythm: Normal rate and regular rhythm.   Pulmonary:      Effort: Pulmonary effort is normal. No respiratory distress.      Breath sounds: Rhonchi (ant exam upper lobes - increased today) present.   Abdominal:      General: There is no distension.      Palpations: Abdomen is soft.      Tenderness: There is no abdominal tenderness (no longer grimaces to palpation ).      Comments: KEYSHAWN-KEY tube in place; healed transverse laparotomy incision   Musculoskeletal:         General: Deformity present.   Skin:     General: Skin is warm and dry.   Neurological:      Mental Status: He is easily aroused. Mental status is at baseline.                  Significant Labs:   Blood Culture:   Recent Labs   Lab 09/01/20  0043 09/01/20  0057 09/16/20  0928 09/20/20  1853 09/20/20  1854   LABBLOO No growth after 5 days. No growth after 5 days. No growth after 5 days. No Growth to date  No Growth to date No Growth to date  No Growth to date     CBC:   Recent Labs   Lab 09/20/20  1853 09/21/20  0500 09/21/20  1344   WBC 16.95* 11.77 11.12   HGB 10.0* 9.2* 9.0*   HCT 31.5* 30.0* 29.1*   * 521* 472*     CMP:   Recent Labs   Lab 09/20/20  1853 09/21/20  0501 09/22/20  0748   * 136 135*   K 3.1* 3.3* 3.7    105 106   CO2 22* 20* 19*   GLU 99 88 77   BUN 7 6 5*   CREATININE 0.5 0.5 0.4*   CALCIUM 8.3* 8.2* 8.1*   PROT 6.9 6.3 5.7*   ALBUMIN 2.2* 1.9* 1.7*   BILITOT 0.2 0.1 0.1   ALKPHOS 108 96 85   AST 26 20 12   ALT 24 20 16   ANIONGAP 12 11 10   EGFRNONAA >60.0 >60.0 >60.0     Respiratory Culture:   Recent Labs   Lab 08/26/20  0505 08/31/20  2157 09/06/20  1103   GSRESP <10 epithelial cells per low power field.  Moderate WBC's  Many Gram negative rods  Many Gram negative diplococci <10 epithelial cells per low power field.  Rare WBC's  Rare Gram negative rods >10 epithelial cells per low power field  Rare WBC's  Rare Gram negative rods   RESPIRATORYC No S aureus isolated.  SERRATIA MARCESCENS  Moderate  Normal respiratory jose also present  *  PSEUDOMONAS AERUGINOSA  Moderate  * No S aureus or Pseudomonas isolated.  SERRATIA MARCESCENS  Moderate  * No S aureus or Pseudomonas isolated.  SERRATIA MARCESCENS  Moderate  *     All pertinent labs within the past 24 hours have been reviewed.    Significant Imaging: I have reviewed all pertinent imaging results/findings within the past 24 hours.   X-Ray Abdomen Portable [[id number]] Resulted: 09/21/20 1155   Order Status: Completed Updated: 09/21/20 1157   Narrative:     EXAMINATION:   XR ABDOMEN PORTABLE     CLINICAL HISTORY:   checking tube position, gastrograffin ordered;      TECHNIQUE:   Supine radiograph of the abdomen.     COMPARISON:   CT abdomen and pelvis 09/20/2020     FINDINGS:   Contrast administered via the patient's gastrostomy tube extends to the right of midline.  I suspect this opacifies the distal stomach given its configuration on yesterday CT.  Follow-up radiograph would be helpful to confirm appropriate positioning.  No definite contrast extravasation along the course of the tubing.     Diffuse gaseous distention of small and large bowel loops.  Urinary bladder is distended with contrast.  Bladder is shifted to the right due to distended rectum.     Dysplastic hips.    Impression:       Contrast appears to opacify the distal stomach when compared to yesterday's CT.  No extravasation seen along the course of the tubing.  Follow-up radiograph can be obtained to ensure expected migration of contrast.     Diffuse gaseous distention of small and large bowel loops.       Electronically signed by: Quiana Méndez   Date: 09/21/2020   Time: 11:55   CT Chest Abdomen Pelvis With Contrast [[id number]] (Abnormal) Resulted: 09/20/20 2310   Order Status: Completed Updated: 09/20/20 2312   Narrative:     EXAMINATION:   CT CHEST ABDOMEN PELVIS WITH CONTRAST (XPD)     CLINICAL HISTORY:   Sepsis;Please include sacral decub;     TECHNIQUE:   Low dose axial images, sagittal and coronal reformations were obtained from the thoracic inlet to the pubic symphysis following the IV administration of 100 mL of Omnipaque 350 .  Oral contrast was not given.     COMPARISON:   CT chest abdomen pelvis, 09/04/2020.     FINDINGS:   Chest:     Evaluation is limited by extensive streak artifact due to the patient's arms overlying the field of view.     Heart size is normal.  No large or central pulmonary embolus.  Evaluation for small pulmonary emboli limited by motion degradation and extensive artifact.     No large consolidation.  No pleural fluid.  No pneumothorax.  There are mild retained secretions  in the left mainstem bronchus.  Partial opacification of the left lower lobe bronchus, but no significant airspace disease in the left lower lobe.     Mild patulous appearance of the esophagus without focal wall thickening.     No bulky lymphadenopathy identified in the chest.     Abdomen:     Evaluation is limited by extensive streak artifact due to the patient's arms overlying the field of view.     Liver is negative for acute finding.  Gallbladder appears surgically absent.  No intrahepatic biliary ductal dilatation.     Spleen, adrenals, and pancreas are negative for acute finding.     Kidneys enhance symmetrically.  No hydronephrosis.     Percutaneous gastrostomy tube is present.  There are no findings of small bowel obstruction.  There is diffuse wall thickening and mucosal edema involving the ascending colon.  Colon is moderately distended with air and liquid stool.  There is small volume of pneumoperitoneum, predominantly layering along the anterior aspect of the mid abdomen.  No portal venous gas.     No organized fluid collection.     No bulky lymphadenopathy.     Abdominal aorta is normal in caliber.  SMA and JAVIER are patent.     Pelvis:     Urinary bladder is distended but otherwise unremarkable.  There are mild inflammatory changes involving the rectosigmoid colon, though improved when compared with the prior study.  Mild presacral edema.  No significant pelvic free fluid.     Bones and soft tissues:     Evaluation of the pelvic soft tissues is limited by extensive artifact related to positioning of the patient's lower extremities.  Chronic dysmorphic and erosive changes involving the bilateral femora and acetabula, similar to prior CT mild diffuse body wall edema in the upper and mid abdomen.  Marked soft tissue edema involving the lower abdominal wall and hip soft tissues.  Bilateral ischial tuberosity pressure ulcers with overlying skin defects and bandage material.  No new focal area bony erosion.  " No drainable fluid collection, allowing for artifact limitations.  Probable left-sided hydrocele, similar to prior CT dated 08/11/2020.    Impression:       New small volume pneumoperitoneum predominantly along the anterior aspect of the mid abdomen.  Findings could be secondary to perforated viscus or gastrostomy tube leak.  Suggest surgical evaluation as clinically appropriate.     Inflammatory changes and wall thickening involving the ascending and rectosigmoid colon suggestive of a nonspecific colitis.     Debris in the left mainstem and left lower lobe bronchus suggestive of retained secretions/mucus or aspiration.     Gaseous distention of bowel which could be related to ileus.     Chronic changes involving the pelvic bones.  Bilateral ischial tuberosity decubitus ulcers with overlying bandage material.  No drainable fluid collection.     This report was flagged in Epic as abnormal.     Critical finding was called to Rosalie Parikh MD by Sheldon Esquivel MD at 22:54 on 09/20/2020.       Electronically signed by: Sheldon Esquivel MD   Date: 09/20/2020   Time: 23:10   X-Ray Chest AP Portable [[id number]] Resulted: 09/20/20 2013   Order Status: Completed Updated: 09/20/20 2016   Narrative:     EXAMINATION:   XR CHEST AP PORTABLE     CLINICAL HISTORY:   Provided history is "Sepsis;  ".     TECHNIQUE:   One view of the chest.     COMPARISON:   09/01/2020.     FINDINGS:   Cardiac wires overlie the chest.  Cardiomediastinal silhouette is not enlarged.  No focal consolidation.  No sizable pleural effusion.  No pneumothorax.  Mild gaseous distention of bowel in the upper abdomen.    Impression:       No acute cardiopulmonary finding identified on this single view.       Electronically signed by: Sheldon Esquivel MD   Date: 09/20/2020   Time: 20:13   Imaging History    2020  Date Procedure Name Status Accession Number Location   09/21/20 11:00 AM X-Ray Abdomen Portable Final [id number] [id number]   09/20/20 10:21 PM CT Chest " Abdomen Pelvis With Contrast Final [id number] [id number]   09/20/20 08:08 PM X-Ray Chest AP Portable Final [id number] [facility]   09/17/20 04:54 PM US Lower Extremity Veins Bilateral Final [id number] [facility]   09/09/20 08:43 AM US Abdomen Limited Final [id number] [facility]   09/04/20 05:42 PM CTA Chest Non Coronary Final [id number] [facility]   09/04/20 05:41 PM CT Abdomen Pelvis With Contrast Final [id number] [facility]   09/01/20 11:47 AM US Lower Extremity Veins Bilateral Final [id number] [facility]   09/01/20 11:46 AM US Upper Extremity Veins Right Final [id number] [facility]   09/01/20 11:46 AM US Upper Extremity Veins Left Final [id number] [facility]   09/01/20 09:54 AM X-Ray Chest 1 View Final [id number] [facility]   08/30/20 11:25 AM X-Ray Chest 1 View Final [id number] [facility]   08/28/20 01:50 PM X-Ray Chest AP Portable Final [id number] [facility]   08/27/20 04:42 AM X-Ray Chest 1 View Final [id number] [facility]   08/26/20 10:30 AM X-Ray Abdomen AP 1 View (KUB) Final [id number] [facility]   08/26/20 08:28 AM X-Ray Abdomen AP 1 View (KUB) Final [id number] [facility]   08/26/20 03:30 AM X-Ray Chest 1 View Final [id number] [facility]   08/26/20 11:20 AM Echo Color Flow Doppler? Yes Final [id number] [facility]

## 2020-09-23 NOTE — SUBJECTIVE & OBJECTIVE
Interval History: Afebrile, vitals stable. No signs that patient is in pain with deep abdominal palpation. Not peritoneal. Having bowel movements. No leukocytosis since 9/20.     Medications:  Continuous Infusions:  Scheduled Meds:   ascorbic acid (vitamin C)  500 mg Per G Tube Daily    baclofen  10 mg Per G Tube TID    ceFEPime (MAXIPIME) IVPB  2 g Intravenous Q12H    diphenhydrAMINE  12.5 mg Per G Tube QHS    enoxaparin  30 mg Subcutaneous Q24H    gabapentin  250 mg Per G Tube QHS    Lactobacillus rhamnosus GG  1 capsule Per G Tube Daily    loperamide  2 mg Per G Tube QID    metronidazole  500 mg Intravenous Q8H    multivitamin liquid no.118  10 mL Per G Tube Daily    PHENobarbitaL  100 mg Per G Tube QHS    propranoloL  20 mg Per G Tube TID    vancomycin (VANCOCIN) IVPB  750 mg Intravenous Q12H     PRN Meds:acetaminophen, albuterol-ipratropium, bisacodyL, dextrose 50%, dextrose 50%, glucagon (human recombinant), glucose, glucose, melatonin, ondansetron, promethazine (PHENERGAN) IVPB, sodium chloride 0.9%     Review of patient's allergies indicates:  No Known Allergies  Objective:     Vital Signs (Most Recent):  Temp: 99.2 °F (37.3 °C) (09/23/20 0349)  Pulse: 97 (09/23/20 0349)  Resp: 20 (09/23/20 0349)  BP: (!) 146/71 (09/23/20 0349)  SpO2: 99 % (09/23/20 0349) Vital Signs (24h Range):  Temp:  [98.4 °F (36.9 °C)-99.2 °F (37.3 °C)] 99.2 °F (37.3 °C)  Pulse:  [] 97  Resp:  [15-20] 20  SpO2:  [93 %-100 %] 99 %  BP: (116-146)/(62-76) 146/71     Weight: 33.1 kg (72 lb 15.6 oz)  Body mass index is 15.79 kg/m².    Intake/Output - Last 3 Shifts       09/21 0700 - 09/22 0659 09/22 0700 - 09/23 0659    P.O. 0 0    Total Intake(mL/kg) 0 (0) 0 (0)    Urine (mL/kg/hr) 250 (0.3) 2475 (3.1)    Stool 40 0    Total Output 290 2475    Net -290 -2475          Urine Occurrence 4 x 0 x    Stool Occurrence 5 x 1 x          Physical Exam  Constitutional:       General: He is awake. He is not in acute distress.  HENT:       Head: Normocephalic and atraumatic.   Eyes:      Conjunctiva/sclera: Conjunctivae normal.   Cardiovascular:      Rate and Rhythm: Normal rate and regular rhythm.   Pulmonary:      Effort: Pulmonary effort is normal. No respiratory distress.   Abdominal:      General: There is no distension.      Palpations: Abdomen is soft.      Tenderness: There is no abdominal tenderness (no longer grimaces to palpation ).      Comments: KEYSHAWN-KEY tube in place; healed transverse laparotomy incision   Musculoskeletal:         General: Deformity present.   Skin:     General: Skin is warm and dry.   Neurological:      Mental Status: He is easily aroused. Mental status is at baseline.         Significant Labs:  CBC:   Recent Labs   Lab 09/23/20  0343   WBC 7.77   RBC 3.38*   HGB 10.2*   HCT 32.1*   *   MCV 95   MCH 30.2   MCHC 31.8*     CMP:   Recent Labs   Lab 09/22/20  0748   GLU 77   CALCIUM 8.1*   ALBUMIN 1.7*   PROT 5.7*   *   K 3.7   CO2 19*      BUN 5*   CREATININE 0.4*   ALKPHOS 85   ALT 16   AST 12   BILITOT 0.1       Significant Diagnostics:  I have reviewed all pertinent imaging results/findings within the past 24 hours.

## 2020-09-23 NOTE — PROGRESS NOTES
Hospital Medicine  Progress Note      Patient Name: Glen Moscoso  MRN: [id number]  Date of Admission: 9/20/2020     Principal Problem: Sepsis     Subjective     Mr. Moscoso' examination remains unchanged, and his labs are stable. Trickle feeds started today, advancing as tolerated. General surgery signing off given his stability, recommending continued IV antibiotics.    Review of Systems    Unable to obtain due to patient's mental status    Medications  Scheduled Meds:   ascorbic acid (vitamin C)  500 mg Per G Tube Daily    baclofen  10 mg Per G Tube TID    ceFEPime (MAXIPIME) IVPB  2 g Intravenous Q12H    diphenhydrAMINE  12.5 mg Per G Tube QHS    enoxaparin  30 mg Subcutaneous Q24H    gabapentin  250 mg Per G Tube QHS    Lactobacillus rhamnosus GG  1 capsule Per G Tube Daily    loperamide  2 mg Per G Tube QID    metroNIDAZOLE  500 mg Per G Tube TID    multivitamin liquid no.118  10 mL Per G Tube Daily    PHENobarbitaL  100 mg Per G Tube QHS    propranoloL  20 mg Per G Tube TID    vancomycin (VANCOCIN) IVPB  750 mg Intravenous Q12H     Continuous Infusions:    PRN Meds:.acetaminophen, albuterol-ipratropium, bisacodyL, dextrose 50%, dextrose 50%, glucagon (human recombinant), glucose, glucose, melatonin, ondansetron, promethazine (PHENERGAN) IVPB, sodium chloride 0.9%    Objective    Physical Examination    Temp:  [97.5 °F (36.4 °C)-99.2 °F (37.3 °C)]   Pulse:  []   Resp:  [15-20]   BP: (116-146)/(62-85)   SpO2:  [92 %-100 %]     Gen: NAD, appears comfortable, frail and chronically ill appearing  Eyes: strabismus  CV: RRR, no M/R/G, no peripheral edema  Resp: Coarse breath sounds, no crackles, no increased work of breathing on room air  GI: Soft, NT, ND, +BS, PEG c/d/i at insertion site  Ext: contracted extremities  Neuro: tracks, does not follow commands    CBC  Recent Labs   Lab 09/21/20  1344 09/22/20  0748 09/23/20  0343   WBC 11.12 9.49 7.77   HGB 9.0* 9.1* 10.2*   HCT 29.1* 29.0* 32.1*   PLT  472* 531* 538*     CMP  Recent Labs   Lab 09/21/20  0501 09/22/20  0748 09/23/20  0343    135* 137   K 3.3* 3.7 3.4*    106 105   CO2 20* 19* 17*   BUN 6 5* 4*   CREATININE 0.5 0.4* 0.5   GLU 88 77 69*   CALCIUM 8.2* 8.1* 8.1*   MG 1.8 1.5* 1.8   PHOS 2.7 2.7 2.6*   ALKPHOS 96 85 92   ALT 20 16 13   AST 20 12 12   ALBUMIN 1.9* 1.7* 1.8*   PROT 6.3 5.7* 6.0   BILITOT 0.1 0.1 0.1       Hospital Course:    Mr. Moscoso presented with fever and was admitted with sepsis on 9/20. He was started on broad-spectrum antibiotics with vanc + cefepime + flagyl, and a CT C/A/P was obtained which was most significant for a small amount of free air. General surgery were consulted who suspected a small perforation. His hemodynamic status improved with IVF resuscitation and he has remained stable with negative cultures. ID were consulted who recommended continued long-term IV antibiotics, preferably in an LTAC setting where his other needs can be attended    Assessment and Plan:    Sepsis - improving  Fever - resolved  Leukocytosis - resolved    · Sepsis improving, afebrile, source suspected to be intra-abdominal  · Hemodynamic status and fever curve improving  · Leukocytosis resolved  · Had been on Zosyn outpatient for recently diagnosed Pseudomonas and Serratia pneumonia with end date of 9/24  · PICC placed 9/18  · Blood cultures NGTD  · Continue vanc + cefepime + flagyl. Appreciate ID and GS assistance    Pneumoperitoneum    - Gen surg following recommending conservative management, now signed off  - Hemodynamically stable with improving sepsis, high risk surgery if indicated  - Continue broad spectrum ABx  - Tolerating trickle feeds with ease, advancing to goal 40 mL/hr     Pneumonia 2/2 Pseudomonas and Serratia    · Stable  · Coarse breath sounds on exam, still not requiring O2  · Family reports rhonchi at baseline  · Management as above     Decubitus Ulcer of Ischial Area, Stage4  Pressure Injury of Right Ischium,  Stage4    · Stable  · S/p debridement with Gen Surg 9/7  · ESR 90, Procal 0.56, CRP pending  · CT pelvis as above  · Continue Vitamin C daily  · Low air mattress  · Turn q2h     Proctocolitis    · From previous admission  · Has been on Zosyn as above, now broadened  · General surgery following     Cerebral Palsy  · Chronic issue  · Continue Baclofen 10mg TID     Seizures Disorder  · Chronic issue  · Continue Phenobarbital 100mg qHS     Severe Protein Calorie Malnutrition     - Appreciate RD assistance, tolerated trickle feeds well, advancing as tolerated    Hypokalemia  Hypophosphatemia  · Improved  · K and Phos daily to follow    Stage IV pressure ulcer    - POA  - Wound care following, report that it looks improved relative to prior (patient is well known to their team)  - No surrounding signs of infection  - Appreciate assistance    Diet: TF  VTE PPX: Enoxaparin  Goals of care: Curative, full code    Christiano Rainey M.D.  Department of Hospital Medicine  [facility]  [phone] (pager)

## 2020-09-23 NOTE — PROGRESS NOTES
[facility]  General Surgery  Progress Note    Subjective:     History of Present Illness:  Mr. Glen Moscoso is a 24 y.o. male with CP, complicated by seizures who presented to the ER for evaluation of fever.  He just had a prolonged and complex hospital stay at [facility] from 8/26-9/18 for seizures, pneumonia, and proctocolitis. He was originally admitted to [facility], where he was found to have Pseudomonas and Serratia pneumonia, as well as a decubitus ulcer that was debrided by Gen Surg on 9/7. He underwent flex sig on 9/9 by GI - biopsies revealed non-specific inflammatory changes; stool studies were negative at that time. He was discharged home 9/18 on Zosyn via LUE PICC until 9/24.  Family reports he was doing well until the day of admission when he started to have fevers up to 101.2F.     Workup in ED revealed leukocytosis to 16.95 and CT chest/abdomen/pelvis revealing ongoing inflammatory changes of the colon with associated foci of small volume pneumoperitoneum; no PVG or significant free peritoneal fluid. General Surgery asked to evaluate given these findings. Currently, patient is admitted to IM; he is hemodynamically stable.     Post-Op Info:  * No surgery found *         Interval History: Afebrile, vitals stable. No signs that patient is in pain with deep abdominal palpation. Not peritoneal. Having bowel movements. No leukocytosis since 9/20.     Medications:  Continuous Infusions:  Scheduled Meds:   ascorbic acid (vitamin C)  500 mg Per G Tube Daily    baclofen  10 mg Per G Tube TID    ceFEPime (MAXIPIME) IVPB  2 g Intravenous Q12H    diphenhydrAMINE  12.5 mg Per G Tube QHS    enoxaparin  30 mg Subcutaneous Q24H    gabapentin  250 mg Per G Tube QHS    Lactobacillus rhamnosus GG  1 capsule Per G Tube Daily    loperamide  2 mg Per G Tube QID    metronidazole  500 mg Intravenous Q8H    multivitamin liquid no.118  10 mL Per G Tube Daily    PHENobarbitaL  100 mg Per G Tube QHS     propranoloL  20 mg Per G Tube TID    vancomycin (VANCOCIN) IVPB  750 mg Intravenous Q12H     PRN Meds:acetaminophen, albuterol-ipratropium, bisacodyL, dextrose 50%, dextrose 50%, glucagon (human recombinant), glucose, glucose, melatonin, ondansetron, promethazine (PHENERGAN) IVPB, sodium chloride 0.9%     Review of patient's allergies indicates:  No Known Allergies  Objective:     Vital Signs (Most Recent):  Temp: 99.2 °F (37.3 °C) (09/23/20 0349)  Pulse: 97 (09/23/20 0349)  Resp: 20 (09/23/20 0349)  BP: (!) 146/71 (09/23/20 0349)  SpO2: 99 % (09/23/20 0349) Vital Signs (24h Range):  Temp:  [98.4 °F (36.9 °C)-99.2 °F (37.3 °C)] 99.2 °F (37.3 °C)  Pulse:  [] 97  Resp:  [15-20] 20  SpO2:  [93 %-100 %] 99 %  BP: (116-146)/(62-76) 146/71     Weight: 33.1 kg (72 lb 15.6 oz)  Body mass index is 15.79 kg/m².    Intake/Output - Last 3 Shifts       09/21 0700 - 09/22 0659 09/22 0700 - 09/23 0659    P.O. 0 0    Total Intake(mL/kg) 0 (0) 0 (0)    Urine (mL/kg/hr) 250 (0.3) 2475 (3.1)    Stool 40 0    Total Output 290 2475    Net -290 -2475          Urine Occurrence 4 x 0 x    Stool Occurrence 5 x 1 x          Physical Exam  Constitutional:       General: He is awake. He is not in acute distress.  HENT:      Head: Normocephalic and atraumatic.   Eyes:      Conjunctiva/sclera: Conjunctivae normal.   Cardiovascular:      Rate and Rhythm: Normal rate and regular rhythm.   Pulmonary:      Effort: Pulmonary effort is normal. No respiratory distress.   Abdominal:      General: There is no distension.      Palpations: Abdomen is soft.      Tenderness: There is no abdominal tenderness (no longer grimaces to palpation ).      Comments: KEYSHAWN-KEY tube in place; healed transverse laparotomy incision   Musculoskeletal:         General: Deformity present.   Skin:     General: Skin is warm and dry.   Neurological:      Mental Status: He is easily aroused. Mental status is at baseline.         Significant Labs:  CBC:   Recent Labs   Lab  09/23/20  0343   WBC 7.77   RBC 3.38*   HGB 10.2*   HCT 32.1*   *   MCV 95   MCH 30.2   MCHC 31.8*     CMP:   Recent Labs   Lab 09/22/20  0748   GLU 77   CALCIUM 8.1*   ALBUMIN 1.7*   PROT 5.7*   *   K 3.7   CO2 19*      BUN 5*   CREATININE 0.4*   ALKPHOS 85   ALT 16   AST 12   BILITOT 0.1       Significant Diagnostics:  I have reviewed all pertinent imaging results/findings within the past 24 hours.    Assessment/Plan:     Pneumoperitoneum  23 yo non-verbal M with CP and poor functional status with recent PNA and proctocolitis; now with fevers, leukocytosis, and imaging findings consistent with ongoing colonic inflammation and small volume pneumoperitoneum.     - Suspect that this finding is related to a small colonic perforation. Given the patient's comorbid conditions and poor functional status, he is certainly at high surgical risk. Given patient's current clinical stability, would recommend holding off on aggressive surgical intervention at this time and continue to manage conservatively with ongoing fluid resuscitation and antibiosis.   - No surgical indications at this time. Leukocytosis resolved. Patient clinically doing well.   -  General Surgery will sign off at this time. Please contact Surgery with any questions or concerns.          Zoe Simms MD  General Surgery  [facility]

## 2020-09-24 PROBLEM — R50.9 FEVER: Status: RESOLVED | Noted: 2020-09-03 | Resolved: 2020-09-24

## 2020-09-24 PROBLEM — E44.0 MODERATE MALNUTRITION: Status: RESOLVED | Noted: 2020-08-27 | Resolved: 2020-09-24

## 2020-09-24 PROBLEM — J15.1 PNEUMONIA DUE TO PSEUDOMONAS SPECIES: Status: RESOLVED | Noted: 2020-08-04 | Resolved: 2020-09-24

## 2020-09-24 PROBLEM — M86.9 OSTEOMYELITIS OF PELVIS: Status: ACTIVE | Noted: 2020-09-24

## 2020-09-24 LAB
ALBUMIN SERPL BCP-MCNC: 1.7 G/DL (ref 3.5–5.2)
ALP SERPL-CCNC: 99 U/L (ref 55–135)
ALT SERPL W/O P-5'-P-CCNC: 14 U/L (ref 10–44)
ANION GAP SERPL CALC-SCNC: 7 MMOL/L (ref 8–16)
AST SERPL-CCNC: 16 U/L (ref 10–40)
BASOPHILS # BLD AUTO: 0.01 K/UL (ref 0–0.2)
BASOPHILS NFR BLD: 0.1 % (ref 0–1.9)
BILIRUB SERPL-MCNC: 0.1 MG/DL (ref 0.1–1)
BUN SERPL-MCNC: 5 MG/DL (ref 6–20)
CALCIUM SERPL-MCNC: 7.9 MG/DL (ref 8.7–10.5)
CHLORIDE SERPL-SCNC: 107 MMOL/L (ref 95–110)
CO2 SERPL-SCNC: 26 MMOL/L (ref 23–29)
CREAT SERPL-MCNC: 0.4 MG/DL (ref 0.5–1.4)
DIFFERENTIAL METHOD: ABNORMAL
EOSINOPHIL # BLD AUTO: 0.3 K/UL (ref 0–0.5)
EOSINOPHIL NFR BLD: 4.4 % (ref 0–8)
ERYTHROCYTE [DISTWIDTH] IN BLOOD BY AUTOMATED COUNT: 15 % (ref 11.5–14.5)
EST. GFR  (AFRICAN AMERICAN): >60 ML/MIN/1.73 M^2
EST. GFR  (NON AFRICAN AMERICAN): >60 ML/MIN/1.73 M^2
GLUCOSE SERPL-MCNC: 105 MG/DL (ref 70–110)
HCT VFR BLD AUTO: 30.7 % (ref 40–54)
HGB BLD-MCNC: 9.6 G/DL (ref 14–18)
IMM GRANULOCYTES # BLD AUTO: 0.02 K/UL (ref 0–0.04)
IMM GRANULOCYTES NFR BLD AUTO: 0.3 % (ref 0–0.5)
LYMPHOCYTES # BLD AUTO: 1.3 K/UL (ref 1–4.8)
LYMPHOCYTES NFR BLD: 16.5 % (ref 18–48)
MAGNESIUM SERPL-MCNC: 1.8 MG/DL (ref 1.6–2.6)
MCH RBC QN AUTO: 30.4 PG (ref 27–31)
MCHC RBC AUTO-ENTMCNC: 31.3 G/DL (ref 32–36)
MCV RBC AUTO: 97 FL (ref 82–98)
MONOCYTES # BLD AUTO: 1 K/UL (ref 0.3–1)
MONOCYTES NFR BLD: 13.5 % (ref 4–15)
NEUTROPHILS # BLD AUTO: 5 K/UL (ref 1.8–7.7)
NEUTROPHILS NFR BLD: 65.2 % (ref 38–73)
NRBC BLD-RTO: 0 /100 WBC
PHOSPHATE SERPL-MCNC: 2.4 MG/DL (ref 2.7–4.5)
PLATELET # BLD AUTO: 544 K/UL (ref 150–350)
PMV BLD AUTO: 9 FL (ref 9.2–12.9)
POTASSIUM SERPL-SCNC: 3.2 MMOL/L (ref 3.5–5.1)
PROT SERPL-MCNC: 5.9 G/DL (ref 6–8.4)
RBC # BLD AUTO: 3.16 M/UL (ref 4.6–6.2)
SODIUM SERPL-SCNC: 140 MMOL/L (ref 136–145)
VANCOMYCIN TROUGH SERPL-MCNC: 15.5 UG/ML (ref 10–22)
WBC # BLD AUTO: 7.65 K/UL (ref 3.9–12.7)

## 2020-09-24 PROCEDURE — 85025 COMPLETE CBC W/AUTO DIFF WBC: CPT

## 2020-09-24 PROCEDURE — 99232 SBSQ HOSP IP/OBS MODERATE 35: CPT | Mod: ,,, | Performed by: INTERNAL MEDICINE

## 2020-09-24 PROCEDURE — 84100 ASSAY OF PHOSPHORUS: CPT

## 2020-09-24 PROCEDURE — 99232 PR SUBSEQUENT HOSPITAL CARE,LEVL II: ICD-10-PCS | Mod: ,,, | Performed by: INTERNAL MEDICINE

## 2020-09-24 PROCEDURE — 87040 BLOOD CULTURE FOR BACTERIA: CPT

## 2020-09-24 PROCEDURE — 25000003 PHARM REV CODE 250: Performed by: INTERNAL MEDICINE

## 2020-09-24 PROCEDURE — 99900026 HC AIRWAY MAINTENANCE (STAT)

## 2020-09-24 PROCEDURE — 63600175 PHARM REV CODE 636 W HCPCS: Performed by: HOSPITALIST

## 2020-09-24 PROCEDURE — 80053 COMPREHEN METABOLIC PANEL: CPT

## 2020-09-24 PROCEDURE — 83735 ASSAY OF MAGNESIUM: CPT

## 2020-09-24 PROCEDURE — 99900035 HC TECH TIME PER 15 MIN (STAT)

## 2020-09-24 PROCEDURE — 94668 MNPJ CHEST WALL SBSQ: CPT

## 2020-09-24 PROCEDURE — 20600001 HC STEP DOWN PRIVATE ROOM

## 2020-09-24 PROCEDURE — 25000003 PHARM REV CODE 250: Performed by: HOSPITALIST

## 2020-09-24 PROCEDURE — 80202 ASSAY OF VANCOMYCIN: CPT

## 2020-09-24 RX ORDER — POTASSIUM CHLORIDE 1.5 G/1.58G
40 POWDER, FOR SOLUTION ORAL ONCE
Status: COMPLETED | OUTPATIENT
Start: 2020-09-24 | End: 2020-09-24

## 2020-09-24 RX ADMIN — LOPERAMIDE HYDROCHLORIDE 2 MG: 1 SOLUTION ORAL at 08:09

## 2020-09-24 RX ADMIN — VANCOMYCIN HYDROCHLORIDE 750 MG: 750 INJECTION, POWDER, LYOPHILIZED, FOR SOLUTION INTRAVENOUS at 10:09

## 2020-09-24 RX ADMIN — PHENOBARBITAL 100 MG: 20 ELIXIR ORAL at 08:09

## 2020-09-24 RX ADMIN — ENOXAPARIN SODIUM 30 MG: 30 INJECTION SUBCUTANEOUS at 04:09

## 2020-09-24 RX ADMIN — PROPRANOLOL HYDROCHLORIDE 20 MG: 20 TABLET ORAL at 03:09

## 2020-09-24 RX ADMIN — LOPERAMIDE HYDROCHLORIDE 2 MG: 1 SOLUTION ORAL at 03:09

## 2020-09-24 RX ADMIN — METRONIDAZOLE 500 MG: 500 TABLET ORAL at 09:09

## 2020-09-24 RX ADMIN — BACLOFEN 10 MG: 10 TABLET ORAL at 09:09

## 2020-09-24 RX ADMIN — PROPRANOLOL HYDROCHLORIDE 20 MG: 20 TABLET ORAL at 09:09

## 2020-09-24 RX ADMIN — GABAPENTIN 250 MG: 250 SOLUTION ORAL at 08:09

## 2020-09-24 RX ADMIN — BACLOFEN 10 MG: 10 TABLET ORAL at 03:09

## 2020-09-24 RX ADMIN — METRONIDAZOLE 500 MG: 500 TABLET ORAL at 03:09

## 2020-09-24 RX ADMIN — DIPHENHYDRAMINE HYDROCHLORIDE 12.5 MG: 25 SOLUTION ORAL at 08:09

## 2020-09-24 RX ADMIN — Medication 500 MG: at 09:09

## 2020-09-24 RX ADMIN — POTASSIUM CHLORIDE 40 MEQ: 1.5 POWDER, FOR SOLUTION ORAL at 06:09

## 2020-09-24 RX ADMIN — Medication 1 CAPSULE: at 09:09

## 2020-09-24 RX ADMIN — CEFEPIME 2 G: 2 INJECTION, POWDER, FOR SOLUTION INTRAVENOUS at 10:09

## 2020-09-24 RX ADMIN — PROPRANOLOL HYDROCHLORIDE 20 MG: 20 TABLET ORAL at 08:09

## 2020-09-24 RX ADMIN — LOPERAMIDE HYDROCHLORIDE 2 MG: 1 SOLUTION ORAL at 09:09

## 2020-09-24 RX ADMIN — VANCOMYCIN HYDROCHLORIDE 750 MG: 750 INJECTION, POWDER, LYOPHILIZED, FOR SOLUTION INTRAVENOUS at 07:09

## 2020-09-24 RX ADMIN — METRONIDAZOLE 500 MG: 500 TABLET ORAL at 08:09

## 2020-09-24 RX ADMIN — ACETAMINOPHEN 650 MG: 325 TABLET ORAL at 04:09

## 2020-09-24 RX ADMIN — BACLOFEN 10 MG: 10 TABLET ORAL at 08:09

## 2020-09-24 RX ADMIN — Medication 10 ML: at 09:09

## 2020-09-24 RX ADMIN — CEFEPIME 2 G: 2 INJECTION, POWDER, FOR SOLUTION INTRAVENOUS at 09:09

## 2020-09-24 RX ADMIN — LOPERAMIDE HYDROCHLORIDE 2 MG: 1 SOLUTION ORAL at 06:09

## 2020-09-24 NOTE — ASSESSMENT & PLAN NOTE
Chronic condition. Patient with poor baseline functional status and non-communicative and bed bound/wheelchair bound with extremity contractures to all extremities at baseline.

## 2020-09-24 NOTE — ASSESSMENT & PLAN NOTE
Chronic and controlled and related to chronic infection, poor nutrition and poor baseline function status. Monitor anemia with daily CBC and transfuse if Hgb < 7.

## 2020-09-24 NOTE — PROGRESS NOTES
[facility] Hospital Medicine  Progress Note    Patient Name: Glen Moscoso  MRN: [id number]  Patient Class: IP- Inpatient   Admission Date: 9/20/2020  Length of Stay: 4 days  Attending Physician: Nohemi Farris MD  Primary Care Provider: Yadi Lawson MD    Hospital Medicine Team: [facility] K Nohemi Farris MD    Subjective:     Principal Problem:Sepsis        HPI:  Mr. Glen Moscoso is a 24 y.o. male with CP, complicated by seizures and a peg tube, who presents to the ER for evaluation of fever.  He just had a prolonged and complex hospital stay at [facility] from 8/26-9/18 for seizures and pneumonia.  He was originally admitted to [facility], where he was found to have Pseudomonas and Serratia pneumonia, as well as a decubitus ulcer that was debrided by Gen Surg on 9/7 - cultures were not sent.  He was discharged home 9/18 on Zosyn via LUE PICC until 9/24.  Family at bedside reports he was doing well until the day of admission when he started to have fevers up to 101.2F.  They deny any new respiratory complaints.  Given his recent hospitalization and fever while on antibiotics, they brought him to the ER for further evaluation.     Upon arrival to the ER, vitals were temp 101.2F, , RR 24 and /75.  Labs showed WBC 17 with ESR 90 and Procal 0.56.  CXR didn't show a new consolidation.  His case was discussed with ID, who suggested Vanc, Cefepime, and Flagyl based on his previous cultures, as well as CT chest/abdomen/pelvis to include sacral wounds.  He was admitted to Hospital Medicine for further management.    Overview/Hospital Course:  CT scan of abdomen and pelvis done on admit showed overnight small amount of free air and general surgery consulted and evaluated patient who recommended conservative management with antibiotics and close monitoring as felt high surgical risk and likely contained small colonic perforation. ID consulted and patient placed on IV Cefepime/Flagyl and  Vancomycin. G tube study done and no air leak noted. Patient on long term abx as outpatient for infected bilateral Stage IV ischial ulcers and recent bout of Serratia and Pseudomonal pneumonia. Antibiotics and wound care for ulcers continued on this admit. Patient with severe cerebral palsy and bed bound/wheelchair bound at baseline. Blood cultures from admit are no growth to date. Patient with no further fever since admit on 9/24 and WBC improving. Plan to continue broad spectrum abx and conservative care for small colonic perforation. Patient back on home TF and tolerating.       Interval History: Patient appears comfortable on exam at present. No p[ain or visual response noted on palpation of abdomen on exam. Patient did have a fever this am which is concerning so need to closely monitor. Blood cultures from admit remain no growth. If fevers persist may have to consider re imaging of abdomen to monitor perforation. IOf no further fever then plan to re image abdomen in 2-3 weeks as per ID recs. ID recommending to extend broad spectrum abx until 10/22 to cover perforation as well as osteomyelitis of pelvis. ID recommends to convert midline to PICC line as abx need to be extended past lifetime of current midline. Patient will require LTAC on discharge due to complexity of wound care, need for long term abx and close medical monitoring of small bowel perforation for any acute worsening.     Review of Systems   Unable to perform ROS: Patient nonverbal     Objective:     Vital Signs (Most Recent):  Temp: 101 °F (38.3 °C) (09/24/20 1646)  Pulse: 97 (09/24/20 1626)  Resp: 18 (09/24/20 1626)  BP: 131/83 (09/24/20 1626)  SpO2: 95 % (09/24/20 1626) on room air Vital Signs (24h Range):  Temp:  [99.4 °F (37.4 °C)-101.1 °F (38.4 °C)] 101 °F (38.3 °C)  Pulse:  [] 97  Resp:  [18-20] 18  SpO2:  [95 %-98 %] 95 %  BP: (116-140)/(58-89) 131/83     Weight: 33.1 kg (72 lb 15.6 oz)  Body mass index is 15.79  kg/m².    Intake/Output Summary (Last 24 hours) at 9/24/2020 1717  Last data filed at 9/24/2020 0000  Gross per 24 hour   Intake 250 ml   Output 0 ml   Net 250 ml      Physical Exam  Vitals signs and nursing note reviewed.   Constitutional:       General: He is not in acute distress.     Appearance: He is well-developed. He is cachectic. He is ill-appearing (Chronic).   Eyes:      Conjunctiva/sclera: Conjunctivae normal.   Neck:      Vascular: No JVD.   Cardiovascular:      Rate and Rhythm: Normal rate and regular rhythm.      Heart sounds: Normal heart sounds. No murmur. No friction rub. No gallop.    Pulmonary:      Effort: Pulmonary effort is normal. No respiratory distress.      Breath sounds: Normal breath sounds. No wheezing.   Abdominal:      General: Abdomen is flat. Bowel sounds are normal. There is no distension.      Palpations: Abdomen is soft.      Tenderness: There is no abdominal tenderness. There is no guarding.   Skin:     Capillary Refill: Capillary refill takes less than 2 seconds.      Findings: No erythema or rash.   Psychiatric:         Speech: He is noncommunicative.         Behavior: Behavior is cooperative.         Significant Labs:   CBC:   Recent Labs   Lab 09/23/20  0343 09/24/20  0533   WBC 7.77 7.65   HGB 10.2* 9.6*   HCT 32.1* 30.7*   * 544*     CMP:   Recent Labs   Lab 09/23/20  0343 09/24/20  0533    140   K 3.4* 3.2*    107   CO2 17* 26   GLU 69* 105   BUN 4* 5*   CREATININE 0.5 0.4*   CALCIUM 8.1* 7.9*   PROT 6.0 5.9*   ALBUMIN 1.8* 1.7*   BILITOT 0.1 0.1   ALKPHOS 92 99   AST 12 16   ALT 13 14   ANIONGAP 15 7*   EGFRNONAA >60.0 >60.0     Magnesium:   Recent Labs   Lab 09/23/20  0343 09/24/20  0533   MG 1.8 1.8     Blood Culture, Routine   Date Value Ref Range Status   09/24/2020 No Growth to date  Preliminary   09/24/2020 No Growth to date  Preliminary       Significant Imaging: I have reviewed all pertinent imaging results/findings within the past 24  hours.      Assessment/Plan:      * Sepsis  Pneumoperitoneum  Colitis  · Sepsis improving with improved WBC but still febrile so need to monitor closely.   · General surgery consulted on admit for pneumoperitoneum seen on admit CT scan of abdomen and pelvis. General surgery felt likely contained leak and very small bowel perforation related to recent colitis. Due to patient's very poor functional level at baseline and malnutrition felt to be very poor surgical candidate and recommended conservative.  · ID consulted and recommended IV Vancomycin, Cefepime and Flagyl to treat both small perforation and osteomyelitis of pelvic area and to extend abx until 10/22.   · ID recommends repeat CT scan of abdomen to re-evaluate perforation in 2-3 weeks or sooner if any acute worsening.  · WBC improving with abx but still with fever so will need to monitor closely as with patient's poor baseline function/noncommunicative status clinical assessment of abdominal worsening more difficult to assess.   · Blood cultures from admit remain no growth.     Decubitus ulcer of ischial area, left, stage IV  Pressure injury of right ischium, stage 4  Osteomyelitis of pelvis  · Wound care consulted and managing and wound care and preventive measures as per wound care recommendations.  · Patient now with exposed bone to ischial areas and ID with concern for underlying osteomyelitis even though wounds themselves do not appear infected so recommend extension of long term abx with IV Vancomycin, Cefepime and Flagyl until at least 10/22. Will need PICC line as midline about ready to  as per ID recs.     Severe protein-calorie malnutrition  · Present on admit and related to colitis, recent medical illnesses. Patient with PEG and receiving TF with Prebio to help with nutrition. Likely cause of poor wound healing and development of ischial ulcers.   · Nutrition consulted and following and continue Peptamen with Prebio at 40 mL/hr as per  nutrition recs to treat malnutrition.       Seizure disorder  Chronic and controlled and continue Phenobarbital to treat.       Anemia of chronic disease  Chronic and controlled and related to chronic infection, poor nutrition and poor baseline function status. Monitor anemia with daily CBC and transfuse if Hgb < 7.       Cerebral palsy  Chronic condition. Patient with poor baseline functional status and non-communicative and bed bound/wheelchair bound with extremity contractures to all extremities at baseline.         VTE Risk Mitigation (From admission, onward)         Ordered     enoxaparin injection 30 mg  Every 24 hours      09/20/20 2201                Discharge Planning   ROCÍO: 9/25/2020     Code Status: Prior   Is the patient medically ready for discharge?: No    Reason for patient still in hospital (select all that apply): Patient trending condition  Discharge Plan A: LTAC  Discharge Delays: None known at this time        Nohemi Farris MD  Department of Hospital Medicine   [facility]

## 2020-09-24 NOTE — HOSPITAL COURSE
CT scan of abdomen and pelvis done on admit showed overnight small amount of free air and general surgery consulted and evaluated patient who recommended conservative management with antibiotics and close monitoring as felt high surgical risk and likely contained small colonic perforation. ID consulted and patient placed on IV Cefepime/Flagyl and Vancomycin. G tube study done and no air leak noted. Patient on long term abx as outpatient for infected bilateral Stage IV ischial ulcers and recent bout of Serratia and Pseudomonal pneumonia. Antibiotics and wound care for ulcers continued on this admit. Patient with severe cerebral palsy and bed bound/wheelchair bound at baseline. Blood cultures from admit are no growth to date. Patient with no further fever since admit on 9/24 and WBC improving. Plan to continue broad spectrum abx and conservative care for small colonic perforation. Patient back on home TF and tolerating. ID recommending to extend current antibiotic regimen to treat bowel perforation and pelvic osteomyelitis until 10/22. Patient only with midline and needs PICC line for longer term antibiotic treatment. PICC line team consulted but stated due to patient's severe arm contractures PICC cannot be placed. PICC team recommended to consult IR for tunneled catheter amd IIR consulted on 9/25 and plan to place line on 9/28 with assistance of anesthesia. IR states to hold tube feedings at midnight on Sunday for line placement on 9/28. Patient had tunneled line placed by IR to right chest wall on 9/28. Patient had issues after line placed with secretions and became hypoxic but aggressively suctioned and improved and patient much improved and back off oxygen on 9/29. Patient was awaiting LTAC placement when he had an acute event on on 10/3 with increased oxygen requirements and transferred to ICU as was up to 15 liters of oxygen. Suspected aspiration event and patient aggressively suctioned. Sputum grew out  Serratia and patient had previously grown out on prior cultures but ID felt patient likely a chronic colonizer and already on antibiotics for treatment. Patient stepped back down to floor on 10/4 and when he stepped back down was weaned all the way back to room air. CTA of chest done on 10/6 and showed no PE. Lower extremity venous ultrasound negative for PE. Patient back to his normal baseline except for intermittent tachycardia and repeat infectious work-up unremarkable. Patient medically ready for discharge and awaiting LTAC placement. Patient accepted and discharged to [facility] on 10/9/2020. Patient's family aware of discharge as mother at bedside at time of discharge. Patient remains medically stable and off all oxygen at time of discharge with no respiratory difficulty and afebrile. Patient discharged on IV Vancomycin, Cefepime and Flagyl to treat pelvic osteomyelitis and resolving colitis. Patient to have continued wound care at [facility] for his ischial wounds that are improving. Patient continued on Peptamen TF via his PEG on discharge. Patient has scheduled follow-up in IF clinic on 10/22/2020 but if still at [facility] will nee to be moved to a later date after discharge from LTAC.

## 2020-09-24 NOTE — ASSESSMENT & PLAN NOTE
Pressure injury of right ischium, stage 4  Osteomyelitis of pelvis  · Wound care consulted and managing and wound care and preventive measures as per wound care recommendations.  · Patient now with exposed bone to ischial areas and ID with concern for underlying osteomyelitis even though wounds themselves do not appear infected so recommend extension of long term abx with IV Vancomycin, Cefepime and Flagyl until at least 10/22. Will need PICC line as midline about ready to  as per ID recs.

## 2020-09-24 NOTE — ASSESSMENT & PLAN NOTE
· Present on admit and related to colitis, recent medical illnesses. Patient with PEG and receiving TF with Prebio to help with nutrition. Likely cause of poor wound healing and development of ischial ulcers.   · Nutrition consulted and following and continue Peptamen with Prebio at 40 mL/hr as per nutrition recs to treat malnutrition.

## 2020-09-24 NOTE — SUBJECTIVE & OBJECTIVE
Interval History: Patient appears comfortable on exam at present. No p[ain or visual response noted on palpation of abdomen on exam. Patient did have a fever this am which is concerning so need to closely monitor. Blood cultures from admit remain no growth. If fevers persist may have to consider re imaging of abdomen to monitor perforation. IOf no further fever then plan to re image abdomen in 2-3 weeks as per ID recs. ID recommending to extend broad spectrum abx until 10/22 to cover perforation as well as osteomyelitis of pelvis. ID recommends to convert midline to PICC line as abx need to be extended past lifetime of current midline. Patient will require LTAC on discharge due to complexity of wound care, need for long term abx and close medical monitoring of small bowel perforation for any acute worsening.     Review of Systems   Unable to perform ROS: Patient nonverbal     Objective:     Vital Signs (Most Recent):  Temp: 101 °F (38.3 °C) (09/24/20 1646)  Pulse: 97 (09/24/20 1626)  Resp: 18 (09/24/20 1626)  BP: 131/83 (09/24/20 1626)  SpO2: 95 % (09/24/20 1626) on room air Vital Signs (24h Range):  Temp:  [99.4 °F (37.4 °C)-101.1 °F (38.4 °C)] 101 °F (38.3 °C)  Pulse:  [] 97  Resp:  [18-20] 18  SpO2:  [95 %-98 %] 95 %  BP: (116-140)/(58-89) 131/83     Weight: 33.1 kg (72 lb 15.6 oz)  Body mass index is 15.79 kg/m².    Intake/Output Summary (Last 24 hours) at 9/24/2020 1717  Last data filed at 9/24/2020 0000  Gross per 24 hour   Intake 250 ml   Output 0 ml   Net 250 ml      Physical Exam  Vitals signs and nursing note reviewed.   Constitutional:       General: He is not in acute distress.     Appearance: He is well-developed. He is cachectic. He is ill-appearing (Chronic).   Eyes:      Conjunctiva/sclera: Conjunctivae normal.   Neck:      Vascular: No JVD.   Cardiovascular:      Rate and Rhythm: Normal rate and regular rhythm.      Heart sounds: Normal heart sounds. No murmur. No friction rub. No gallop.     Pulmonary:      Effort: Pulmonary effort is normal. No respiratory distress.      Breath sounds: Normal breath sounds. No wheezing.   Abdominal:      General: Abdomen is flat. Bowel sounds are normal. There is no distension.      Palpations: Abdomen is soft.      Tenderness: There is no abdominal tenderness. There is no guarding.   Skin:     Capillary Refill: Capillary refill takes less than 2 seconds.      Findings: No erythema or rash.   Psychiatric:         Speech: He is noncommunicative.         Behavior: Behavior is cooperative.         Significant Labs:   CBC:   Recent Labs   Lab 09/23/20  0343 09/24/20  0533   WBC 7.77 7.65   HGB 10.2* 9.6*   HCT 32.1* 30.7*   * 544*     CMP:   Recent Labs   Lab 09/23/20 0343 09/24/20  0533    140   K 3.4* 3.2*    107   CO2 17* 26   GLU 69* 105   BUN 4* 5*   CREATININE 0.5 0.4*   CALCIUM 8.1* 7.9*   PROT 6.0 5.9*   ALBUMIN 1.8* 1.7*   BILITOT 0.1 0.1   ALKPHOS 92 99   AST 12 16   ALT 13 14   ANIONGAP 15 7*   EGFRNONAA >60.0 >60.0     Magnesium:   Recent Labs   Lab 09/23/20  0343 09/24/20  0533   MG 1.8 1.8     Blood Culture, Routine   Date Value Ref Range Status   09/24/2020 No Growth to date  Preliminary   09/24/2020 No Growth to date  Preliminary       Significant Imaging: I have reviewed all pertinent imaging results/findings within the past 24 hours.

## 2020-09-24 NOTE — HPI
Mr. Glen Moscoso is a 24 y.o. male with CP, complicated by seizures and a peg tube, who presents to the ER for evaluation of fever.  He just had a prolonged and complex hospital stay at [facility] from 8/26-9/18 for seizures and pneumonia.  He was originally admitted to [facility], where he was found to have Pseudomonas and Serratia pneumonia, as well as a decubitus ulcer that was debrided by Gen Surg on 9/7 - cultures were not sent.  He was discharged home 9/18 on Zosyn via LUE PICC until 9/24. Family at bedside reports he was doing well until the day of admission when he started to have fevers up to 101.2F. They deny any new respiratory complaints.  Given his recent hospitalization and fever while on antibiotics, they brought him to the ER for further evaluation.     Upon arrival to the ER, vitals were temp 101.2F, , RR 24 and /75.  Labs showed WBC 17 with ESR 90 and Procal 0.56.  CXR didn't show a new consolidation.  His case was discussed with ID, who suggested Vanc, Cefepime, and Flagyl based on his previous cultures, as well as CT chest/abdomen/pelvis to include sacral wounds. He was admitted to Hospital Medicine for further management.

## 2020-09-24 NOTE — ASSESSMENT & PLAN NOTE
Pneumoperitoneum  Colitis  · Sepsis improving with improved WBC but still febrile so need to monitor closely.   · General surgery consulted on admit for pneumoperitoneum seen on admit CT scan of abdomen and pelvis. General surgery felt likely contained leak and very small bowel perforation related to recent colitis. Due to patient's very poor functional level at baseline and malnutrition felt to be very poor surgical candidate and recommended conservative.  · ID consulted and recommended IV Vancomycin, Cefepime and Flagyl to treat both small perforation and osteomyelitis of pelvic area and to extend abx until 10/22.   · ID recommends repeat CT scan of abdomen to re-evaluate perforation in 2-3 weeks or sooner if any acute worsening.  · WBC improving with abx but still with fever so will need to monitor closely as with patient's poor baseline function/noncommunicative status clinical assessment of abdominal worsening more difficult to assess.   · Blood cultures from admit remain no growth.

## 2020-09-24 NOTE — PROGRESS NOTES
Pharmacokinetic Assessment Follow Up: IV Vancomycin    Vancomycin serum concentration assessment(s):    The trough level was drawn correctly and can be used to guide therapy at this time. The measurement is within the desired definitive target range of 15 to 20 mcg/mL.    Vancomycin Regimen Plan:    Continue regimen to Vancomycin 750 mg IV every 12 hours with next serum trough concentration measured at 0700 prior to   dose on 9/28    Drug levels (last 3 results):  Recent Labs   Lab Result Units 09/22/20  0748 09/24/20  0533   Vancomycin-Trough ug/mL 17.0 15.5       Pharmacy will continue to follow and monitor vancomycin.    Please contact pharmacy at extension [phone] for questions regarding this assessment.    Thank you for the consult,   Raymundo Chakraborty       Patient brief summary:  Glen Moscoso is a 24 y.o. male initiated on antimicrobial therapy with IV Vancomycin for treatment of  PNA cx, wound osteo, and GI perf      Drug Allergies:   Review of patient's allergies indicates:  No Known Allergies    Actual Body Weight:   33.1kg    Renal Function:   Estimated Creatinine Clearance: 135.6 mL/min (A) (based on SCr of 0.4 mg/dL (L)).,     Dialysis Method (if applicable):  N/A    CBC (last 72 hours):  Recent Labs   Lab Result Units 09/21/20  1344 09/22/20  0748 09/23/20  0343 09/24/20  0533   WBC K/uL 11.12 9.49 7.77 7.65   Hemoglobin g/dL 9.0* 9.1* 10.2* 9.6*   Hematocrit % 29.1* 29.0* 32.1* 30.7*   Platelets K/uL 472* 531* 538* 544*   Gran% % 71.4 73.0 69.0 65.2   Lymph% % 16.0* 14.1* 19.0 16.5*   Mono% % 11.8 12.4 11.5 13.5   Eosinophil% % 0.2 0.1 0.1 4.4   Basophil% % 0.2 0.2 0.3 0.1   Differential Method  Automated Automated Automated Automated       Metabolic Panel (last 72 hours):  Recent Labs   Lab Result Units 09/22/20  0748 09/22/20  0757 09/23/20  0343 09/24/20  0533   Sodium mmol/L 135*  --  137 140   Potassium mmol/L 3.7  --  3.4* 3.2*   Chloride mmol/L 106  --  105 107   CO2 mmol/L 19*  --  17* 26   Glucose  mg/dL 77  --  69* 105   Glucose, UA   --  Negative  --   --    BUN, Bld mg/dL 5*  --  4* 5*   Creatinine mg/dL 0.4*  --  0.5 0.4*   Albumin g/dL 1.7*  --  1.8* 1.7*   Total Bilirubin mg/dL 0.1  --  0.1 0.1   Alkaline Phosphatase U/L 85  --  92 99   AST U/L 12  --  12 16   ALT U/L 16  --  13 14   Magnesium mg/dL 1.5*  --  1.8 1.8   Phosphorus mg/dL 2.7  --  2.6* 2.4*       Vancomycin Administrations:  vancomycin given in the last 96 hours                     vancomycin 750 mg in dextrose 5 % 250 mL IVPB (ready to mix system) (mg) 750 mg New Bag 09/23/20 1958     750 mg New Bag  0812     750 mg New Bag 09/22/20 2155     750 mg New Bag  0822     750 mg New Bag 09/21/20 2236     750 mg New Bag  0804    vancomycin in dextrose 5 % 1 gram/250 mL IVPB 1,000 mg (mg) 1,000 mg New Bag 09/20/20 2016                    Microbiologic Results:  Microbiology Results (last 7 days)       Procedure Component Value Units Date/Time    Blood culture [[id number]] Collected: 09/24/20 0533    Order Status: Sent Specimen: Blood Updated: 09/24/20 0625    Blood culture [[id number]] Collected: 09/24/20 0533    Order Status: Sent Specimen: Blood Updated: 09/24/20 0625    Blood culture x two cultures. Draw prior to antibiotics. [[id number]] Collected: 09/20/20 1854    Order Status: Completed Specimen: Blood from Peripheral, Hand, Right Updated: 09/23/20 2012     Blood Culture, Routine No Growth to date      No Growth to date      No Growth to date      No Growth to date    Narrative:      Aerobic and anaerobic    Blood culture x two cultures. Draw prior to antibiotics. [[id number]] Collected: 09/20/20 1853    Order Status: Completed Specimen: Blood from Peripheral, Hand, Right Updated: 09/23/20 2012     Blood Culture, Routine No Growth to date      No Growth to date      No Growth to date      No Growth to date    Narrative:      Aerobic and anaerobic

## 2020-09-24 NOTE — PLAN OF CARE
POC reviewed with patient, questions and concerns addressed.  0000 Temp 100.5 administered tylenol via g tube. Notified Dr Cortez.. Temp at 0030 99.4 Increased tube feeding to 30 at 0300. Residual prior to increase was 30cc. Changed incontinent pads x3 and redressed wound on Left buttocks. No complaints.  Safety maintained.  Bed locked, in lowest position, call light within reach, side rails x2. Mobilized to their highest function. See doc flow sheets for further information. Will continue to monitor.

## 2020-09-24 NOTE — PLAN OF CARE
Call placed to patient's mother Leni ([phone]) to discuss her son's discharge plan. Ms. Moscoso stated that the MD has already spoken to her about her son going to an LTAC before returning home. She is agreeable and a blast referral was sent.         09/24/20 1351   Post-Acute Status   Post-Acute Authorization Placement  (LTAC)   Post-Acute Placement Status Referrals Sent   Discharge Delays None known at this time

## 2020-09-25 ENCOUNTER — ANESTHESIA EVENT (OUTPATIENT)
Dept: ENDOSCOPY | Facility: HOSPITAL | Age: 25
DRG: 871 | End: 2020-09-25
Payer: MEDICAID

## 2020-09-25 LAB
ALBUMIN SERPL BCP-MCNC: 2 G/DL (ref 3.5–5.2)
ALP SERPL-CCNC: 104 U/L (ref 55–135)
ALT SERPL W/O P-5'-P-CCNC: 22 U/L (ref 10–44)
ANION GAP SERPL CALC-SCNC: 7 MMOL/L (ref 8–16)
AST SERPL-CCNC: 26 U/L (ref 10–40)
BACTERIA BLD CULT: NORMAL
BACTERIA BLD CULT: NORMAL
BASOPHILS # BLD AUTO: 0.02 K/UL (ref 0–0.2)
BASOPHILS NFR BLD: 0.2 % (ref 0–1.9)
BILIRUB SERPL-MCNC: 0.1 MG/DL (ref 0.1–1)
BUN SERPL-MCNC: 5 MG/DL (ref 6–20)
CALCIUM SERPL-MCNC: 8 MG/DL (ref 8.7–10.5)
CHLORIDE SERPL-SCNC: 104 MMOL/L (ref 95–110)
CO2 SERPL-SCNC: 25 MMOL/L (ref 23–29)
CREAT SERPL-MCNC: 0.5 MG/DL (ref 0.5–1.4)
DIFFERENTIAL METHOD: ABNORMAL
EOSINOPHIL # BLD AUTO: 0 K/UL (ref 0–0.5)
EOSINOPHIL NFR BLD: 0 % (ref 0–8)
ERYTHROCYTE [DISTWIDTH] IN BLOOD BY AUTOMATED COUNT: 15.2 % (ref 11.5–14.5)
EST. GFR  (AFRICAN AMERICAN): >60 ML/MIN/1.73 M^2
EST. GFR  (NON AFRICAN AMERICAN): >60 ML/MIN/1.73 M^2
GLUCOSE SERPL-MCNC: 118 MG/DL (ref 70–110)
HCT VFR BLD AUTO: 33.5 % (ref 40–54)
HGB BLD-MCNC: 10.3 G/DL (ref 14–18)
IMM GRANULOCYTES # BLD AUTO: 0.04 K/UL (ref 0–0.04)
IMM GRANULOCYTES NFR BLD AUTO: 0.4 % (ref 0–0.5)
LYMPHOCYTES # BLD AUTO: 1.6 K/UL (ref 1–4.8)
LYMPHOCYTES NFR BLD: 14.9 % (ref 18–48)
MAGNESIUM SERPL-MCNC: 2 MG/DL (ref 1.6–2.6)
MCH RBC QN AUTO: 30.2 PG (ref 27–31)
MCHC RBC AUTO-ENTMCNC: 30.7 G/DL (ref 32–36)
MCV RBC AUTO: 98 FL (ref 82–98)
MONOCYTES # BLD AUTO: 1.1 K/UL (ref 0.3–1)
MONOCYTES NFR BLD: 10.1 % (ref 4–15)
NEUTROPHILS # BLD AUTO: 7.9 K/UL (ref 1.8–7.7)
NEUTROPHILS NFR BLD: 74.4 % (ref 38–73)
NRBC BLD-RTO: 0 /100 WBC
PHOSPHATE SERPL-MCNC: 1.5 MG/DL (ref 2.7–4.5)
PLATELET # BLD AUTO: 634 K/UL (ref 150–350)
PMV BLD AUTO: 9.7 FL (ref 9.2–12.9)
POTASSIUM SERPL-SCNC: 4.4 MMOL/L (ref 3.5–5.1)
PROT SERPL-MCNC: 6.5 G/DL (ref 6–8.4)
RBC # BLD AUTO: 3.41 M/UL (ref 4.6–6.2)
SODIUM SERPL-SCNC: 136 MMOL/L (ref 136–145)
WBC # BLD AUTO: 10.57 K/UL (ref 3.9–12.7)

## 2020-09-25 PROCEDURE — 36415 COLL VENOUS BLD VENIPUNCTURE: CPT

## 2020-09-25 PROCEDURE — 84100 ASSAY OF PHOSPHORUS: CPT

## 2020-09-25 PROCEDURE — 94668 MNPJ CHEST WALL SBSQ: CPT

## 2020-09-25 PROCEDURE — 83735 ASSAY OF MAGNESIUM: CPT

## 2020-09-25 PROCEDURE — 25000003 PHARM REV CODE 250: Performed by: INTERNAL MEDICINE

## 2020-09-25 PROCEDURE — 97803 MED NUTRITION INDIV SUBSEQ: CPT

## 2020-09-25 PROCEDURE — 94761 N-INVAS EAR/PLS OXIMETRY MLT: CPT

## 2020-09-25 PROCEDURE — 99232 SBSQ HOSP IP/OBS MODERATE 35: CPT | Mod: ,,, | Performed by: INTERNAL MEDICINE

## 2020-09-25 PROCEDURE — 80053 COMPREHEN METABOLIC PANEL: CPT

## 2020-09-25 PROCEDURE — 99900026 HC AIRWAY MAINTENANCE (STAT)

## 2020-09-25 PROCEDURE — 63600175 PHARM REV CODE 636 W HCPCS: Performed by: HOSPITALIST

## 2020-09-25 PROCEDURE — 20600001 HC STEP DOWN PRIVATE ROOM

## 2020-09-25 PROCEDURE — 99232 PR SUBSEQUENT HOSPITAL CARE,LEVL II: ICD-10-PCS | Mod: ,,, | Performed by: INTERNAL MEDICINE

## 2020-09-25 PROCEDURE — 99900035 HC TECH TIME PER 15 MIN (STAT)

## 2020-09-25 PROCEDURE — 85025 COMPLETE CBC W/AUTO DIFF WBC: CPT

## 2020-09-25 PROCEDURE — 25000003 PHARM REV CODE 250: Performed by: HOSPITALIST

## 2020-09-25 RX ADMIN — LOPERAMIDE HYDROCHLORIDE 2 MG: 1 SOLUTION ORAL at 10:09

## 2020-09-25 RX ADMIN — LOPERAMIDE HYDROCHLORIDE 2 MG: 1 SOLUTION ORAL at 04:09

## 2020-09-25 RX ADMIN — LOPERAMIDE HYDROCHLORIDE 2 MG: 1 SOLUTION ORAL at 09:09

## 2020-09-25 RX ADMIN — PHENOBARBITAL 100 MG: 20 ELIXIR ORAL at 10:09

## 2020-09-25 RX ADMIN — Medication 10 ML: at 09:09

## 2020-09-25 RX ADMIN — BACLOFEN 10 MG: 10 TABLET ORAL at 02:09

## 2020-09-25 RX ADMIN — GABAPENTIN 250 MG: 250 SOLUTION ORAL at 09:09

## 2020-09-25 RX ADMIN — ACETAMINOPHEN 650 MG: 325 TABLET ORAL at 11:09

## 2020-09-25 RX ADMIN — PROPRANOLOL HYDROCHLORIDE 20 MG: 20 TABLET ORAL at 09:09

## 2020-09-25 RX ADMIN — METRONIDAZOLE 500 MG: 500 TABLET ORAL at 02:09

## 2020-09-25 RX ADMIN — ENOXAPARIN SODIUM 30 MG: 30 INJECTION SUBCUTANEOUS at 04:09

## 2020-09-25 RX ADMIN — BACLOFEN 10 MG: 10 TABLET ORAL at 09:09

## 2020-09-25 RX ADMIN — SODIUM PHOSPHATE, MONOBASIC, MONOHYDRATE 20.01 MMOL: 276; 142 INJECTION, SOLUTION INTRAVENOUS at 06:09

## 2020-09-25 RX ADMIN — Medication 500 MG: at 09:09

## 2020-09-25 RX ADMIN — BACLOFEN 10 MG: 10 TABLET ORAL at 10:09

## 2020-09-25 RX ADMIN — CEFEPIME 2 G: 2 INJECTION, POWDER, FOR SOLUTION INTRAVENOUS at 10:09

## 2020-09-25 RX ADMIN — METRONIDAZOLE 500 MG: 500 TABLET ORAL at 10:09

## 2020-09-25 RX ADMIN — PROPRANOLOL HYDROCHLORIDE 20 MG: 20 TABLET ORAL at 02:09

## 2020-09-25 RX ADMIN — Medication 1 CAPSULE: at 09:09

## 2020-09-25 RX ADMIN — LOPERAMIDE HYDROCHLORIDE 2 MG: 1 SOLUTION ORAL at 02:09

## 2020-09-25 RX ADMIN — METRONIDAZOLE 500 MG: 500 TABLET ORAL at 09:09

## 2020-09-25 RX ADMIN — VANCOMYCIN HYDROCHLORIDE 750 MG: 750 INJECTION, POWDER, LYOPHILIZED, FOR SOLUTION INTRAVENOUS at 10:09

## 2020-09-25 RX ADMIN — VANCOMYCIN HYDROCHLORIDE 750 MG: 750 INJECTION, POWDER, LYOPHILIZED, FOR SOLUTION INTRAVENOUS at 09:09

## 2020-09-25 NOTE — PLAN OF CARE
Problem: Adult Inpatient Plan of Care  Goal: Plan of Care Review  Outcome: Ongoing, Progressing  Goal: Patient-Specific Goal (Individualization)  Outcome: Ongoing, Progressing  Flowsheets (Taken 9/24/2020 1906)  Individualized Care Needs: NICOLA  Anxieties, Fears or Concerns: NICOLA  Patient-Specific Goals (Include Timeframe): NICOLA  Goal: Optimal Comfort and Wellbeing  Outcome: Ongoing, Progressing  Intervention: Provide Person-Centered Care  Flowsheets (Taken 9/24/2020 1906)  Trust Relationship/Rapport:   care explained   emotional support provided   reassurance provided     Problem: Nutrition Impaired (Sepsis/Septic Shock)  Goal: Optimal Nutrition Intake  Outcome: Ongoing, Progressing  Intervention: Promote and Optimize Nutrition Delivery  Flowsheets (Taken 9/24/2020 1906)  Nutrition Support Management: (Tube feedings continued) other (see comments)     Pt nonverbal, awake and alert. Pt had temp today, given tylenol, temp now 100. Wound care completed to buttocks bilaterally. No other changes. Will continue to monitor.

## 2020-09-25 NOTE — NURSING
Wound care completed to bilateral buttocks wounds. Cleansed with wound cleanser and packed with triad and gauze. Covered with foam dressing. Will continue to monitor.

## 2020-09-25 NOTE — PROGRESS NOTES
"  [facility]  Adult Nutrition  Consult Note    SUMMARY     Recommendations    1. Continue current TF regimen of Peptamen 1.5 Prebio @ 40 mL/hr - meeting needs.    - If bolus TFs desired for discharge, rec'd Peptamen 1.5 Prebio - 4 cans/day to provide 1500 kcals, 68 g of protein, 772 mL fluid.  2. RD to monitor & follow-up.    Goals: Meet % EEN, EPN by RD f/u date  Nutrition Goal Status: goal met  Communication of RD Recs: reviewed with RN    Reason for Assessment    Reason For Assessment: RD follow-up  Diagnosis: other (see comments)(Sepsis)  Relevant Medical History: CP, PEG  Interdisciplinary Rounds: did not attend    General Information Comments: Pt remains NPO, tolerating enteral nutrition via PEG at goal. NFPE complete ; pt meets criteria for moderate acute malnutrition. Please see PES statement for details. UBW ~ 85 kg per chart review. Wounds noted.  Nutrition Discharge Planning: Tolerance of TF    Nutrition/Diet History    Patient Reported Diet/Restrictions/Preferences: no oral intake  Factors Affecting Nutritional Intake: NPO    Anthropometrics    Temp: 99.1 °F (37.3 °C)  Height: 4' 9" (144.8 cm)  Height (inches): 57 in  Weight Method: Bed Scale  Weight: 33.1 kg (72 lb 15.6 oz)  Weight (lb): 72.97 lb  Ideal Body Weight (IBW), Male: 88 lb  % Ideal Body Weight, Male (lb): 82.92 %  BMI (Calculated): 15.8  BMI Grade: less than 16 protein-energy malnutrition grade III  Usual Body Weight (UBW), k.6 kg  % Usual Body Weight: 85.93  % Weight Change From Usual Weight: -14.25 %    Lab/Procedures/Meds    Pertinent Labs Reviewed: reviewed  Pertinent Labs Comments: -  Pertinent Medications Reviewed: reviewed  Pertinent Medications Comments: -    Estimated/Assessed Needs    Weight Used For Calorie Calculations: 33.1 kg (72 lb 15.6 oz)     Energy Calorie Requirements (kcal): 1413 kcal/d  Energy Need Method: [place]-St Jeor(1.25 PAL)     Protein Requirements: 50-66 g/d (1.5-2 g/kg)  Weight " Used For Protein Calculations: 33.1 kg (72 lb 15.6 oz)     Estimated Fluid Requirement Method: other (see comments)(Per MD or 1 mL/kcal)  RDA Method (mL): 1413    Nutrition Prescription Ordered    Current Diet Order: NPO  Current Nutrition Support Formula Ordered: Peptamen 1.5 w/Prebio  Current Nutrition Support Rate Ordered: 40 mL/hr    Evaluation of Received Nutrient/Fluid Intake    Enteral Calories (kcal): 1440  Enteral Protein (gm): 65  Enteral (Free Water) Fluid (mL): 739    % Kcal Needs: 102%  % Protein Needs: 100%    Energy Calories Required: meeting needs  Protein Required: meeting needs  Fluid Required: other (see comments)(Per MD or 1 mL/kcal)    Comments: LBM: 9/23    Tolerance: tolerating    Nutrition Risk    Level of Risk/Frequency of Follow-up: (1x/week)     Assessment and Plan    Moderate malnutrition    Nutrition Problem:  Moderate Protein-Calorie Malnutrition  Malnutrition in the context of Acute Illness/Injury    Related to (etiology):  Inability to consume sufficient energy    Signs and Symptoms (as evidenced by):  Body Fat Depletion: moderate depletion of orbitals, triceps and thoracic and lumbar region   Muscle Mass Depletion: moderate depletion of temples, clavicle region, scapular region and interosseous muscle   Weight Loss: 14% x 9 months    Interventions(treatment strategy):  Collaboration of nutrition care w/ other providers     Nutrition Diagnosis Status:  Continues     Monitor and Evaluation    Food and Nutrient Intake: enteral nutrition intake  Food and Nutrient Adminstration: enteral and parenteral nutrition administration  Physical Activity and Function: nutrition-related ADLs and IADLs  Anthropometric Measurements: weight, weight change  Biochemical Data, Medical Tests and Procedures: inflammatory profile, lipid profile, glucose/endocrine profile, gastrointestinal profile, electrolyte and renal panel  Nutrition-Focused Physical Findings: overall appearance     Malnutrition  Assessment    Weight Loss (Malnutrition): greater than 10% in 6 months  Energy Intake (Malnutrition): other (see comments)(NICOLA)  Subcutaneous Fat (Malnutrition): moderate depletion  Muscle Mass (Malnutrition): moderate depletion   Orbital Region (Subcutaneous Fat Loss): moderate depletion  Upper Arm Region (Subcutaneous Fat Loss): moderate depletion  Thoracic and Lumbar Region: moderate depletion   [place] Region (Muscle Loss): moderate depletion  Clavicle Bone Region (Muscle Loss): moderate depletion  Dorsal Hand (Muscle Loss): moderate depletion  Anterior Thigh Region (Muscle Loss): severe depletion  Posterior Calf Region (Muscle Loss): severe depletion     Nutrition Follow-Up    RD Follow-up?: Yes

## 2020-09-25 NOTE — SUBJECTIVE & OBJECTIVE
Interval History: PICC line team consulted but stated due to patient's severe arm contractures PICC cannot be placed. PICC team recommended to consult IR for tunneled catheter amd IIR consulted on 9/25 and plan to place line on 9/28 with assistance of anesthesia. IR states to hold tube feedings at midnight on Sunday for line placement on 9/28. ANGELO/SW working with family on SNF placement. Patient had fever yesterday afternoon at 1600 of 101 F. Patient with no fever since but need to closely monitor as if continues likely will need to re image abdomen.     Review of Systems   Unable to perform ROS: Patient nonverbal     Objective:     Vital Signs (Most Recent):  Temp: 98.8 °F (37.1 °C) (09/25/20 1617)  Pulse: 96 (09/25/20 1617)  Resp: 20 (09/25/20 1617)  BP: 114/67 (09/25/20 1617)  SpO2: 95 % (09/25/20 1617) Vital Signs (24h Range):  Temp:  [98.4 °F (36.9 °C)-100 °F (37.8 °C)] 98.8 °F (37.1 °C)  Pulse:  [] 96  Resp:  [16-20] 20  SpO2:  [95 %-98 %] 95 %  BP: (105-127)/(67-96) 114/67     Weight: 33.1 kg (72 lb 15.6 oz)  Body mass index is 15.79 kg/m².    Intake/Output Summary (Last 24 hours) at 9/25/2020 1730  Last data filed at 9/24/2020 1953  Gross per 24 hour   Intake 500 ml   Output --   Net 500 ml      Physical Exam  Vitals signs and nursing note reviewed.   Constitutional:       General: He is not in acute distress.     Appearance: He is well-developed. He is cachectic. He is ill-appearing (Chronic).   Eyes:      Conjunctiva/sclera: Conjunctivae normal.   Neck:      Vascular: No JVD.   Cardiovascular:      Rate and Rhythm: Normal rate and regular rhythm.      Heart sounds: Normal heart sounds. No murmur. No friction rub. No gallop.    Pulmonary:      Effort: Pulmonary effort is normal. No respiratory distress.      Breath sounds: Normal breath sounds. No wheezing.   Abdominal:      General: Abdomen is flat. Bowel sounds are normal. There is no distension.      Palpations: Abdomen is soft.      Tenderness:  There is no abdominal tenderness. There is no guarding.   Skin:     Capillary Refill: Capillary refill takes less than 2 seconds.      Findings: No erythema or rash.   Psychiatric:         Speech: He is noncommunicative.         Behavior: Behavior is cooperative.         Significant Labs:   CBC:   Recent Labs   Lab 09/24/20  0533 09/25/20  0423   WBC 7.65 10.57   HGB 9.6* 10.3*   HCT 30.7* 33.5*   * 634*     CMP:   Recent Labs   Lab 09/24/20 0533 09/25/20 0423    136   K 3.2* 4.4    104   CO2 26 25    118*   BUN 5* 5*   CREATININE 0.4* 0.5   CALCIUM 7.9* 8.0*   PROT 5.9* 6.5   ALBUMIN 1.7* 2.0*   BILITOT 0.1 0.1   ALKPHOS 99 104   AST 16 26   ALT 14 22   ANIONGAP 7* 7*   EGFRNONAA >60.0 >60.0     Magnesium:   Recent Labs   Lab 09/24/20 0533 09/25/20 0423   MG 1.8 2.0     Phosphorus 1.5    Significant Imaging: I have reviewed all pertinent imaging results/findings within the past 24 hours.

## 2020-09-25 NOTE — PLAN OF CARE
POC reviewed with patient, questions and concerns addressed. Increased tube feeding to 40cc hr at 1900. Pt tolerating without any complications. No acute events through the night.  All Vital signs stable. No complaints.  AAOx4. Safety maintained.  Bed locked, in lowest position, call light within reach, side rails x2. Mobilized to their highest function. See doc flow sheets for further information. Will continue to monitor.

## 2020-09-25 NOTE — PROGRESS NOTES
[facility] Hospital Medicine  Progress Note    Patient Name: Glen Moscoso  MRN: [id number]  Patient Class: IP- Inpatient   Admission Date: 9/20/2020  Length of Stay: 5 days  Attending Physician: Nohemi Farris MD  Primary Care Provider: Yadi Lawson MD    Hospital Medicine Team: [facility] HOSP MED K Nohemi Farris MD    Subjective:     Principal Problem:Sepsis        HPI:  Mr. Glen Moscoso is a 24 y.o. male with CP, complicated by seizures and a peg tube, who presents to the ER for evaluation of fever.  He just had a prolonged and complex hospital stay at [facility] from 8/26-9/18 for seizures and pneumonia.  He was originally admitted to [facility], where he was found to have Pseudomonas and Serratia pneumonia, as well as a decubitus ulcer that was debrided by Gen Surg on 9/7 - cultures were not sent.  He was discharged home 9/18 on Zosyn via LUE PICC until 9/24.  Family at bedside reports he was doing well until the day of admission when he started to have fevers up to 101.2F.  They deny any new respiratory complaints.  Given his recent hospitalization and fever while on antibiotics, they brought him to the ER for further evaluation.     Upon arrival to the ER, vitals were temp 101.2F, , RR 24 and /75.  Labs showed WBC 17 with ESR 90 and Procal 0.56.  CXR didn't show a new consolidation.  His case was discussed with ID, who suggested Vanc, Cefepime, and Flagyl based on his previous cultures, as well as CT chest/abdomen/pelvis to include sacral wounds.  He was admitted to Hospital Medicine for further management.    Overview/Hospital Course:  CT scan of abdomen and pelvis done on admit showed overnight small amount of free air and general surgery consulted and evaluated patient who recommended conservative management with antibiotics and close monitoring as felt high surgical risk and likely contained small colonic perforation. ID consulted and patient placed on IV Cefepime/Flagyl and  Vancomycin. G tube study done and no air leak noted. Patient on long term abx as outpatient for infected bilateral Stage IV ischial ulcers and recent bout of Serratia and Pseudomonal pneumonia. Antibiotics and wound care for ulcers continued on this admit. Patient with severe cerebral palsy and bed bound/wheelchair bound at baseline. Blood cultures from admit are no growth to date. Patient with no further fever since admit on 9/24 and WBC improving. Plan to continue broad spectrum abx and conservative care for small colonic perforation. Patient back on home TF and tolerating. ID recommending to extend current antibiotic regimen to treat bowel perforation and pelvic osteomyelitis until 10/22. Patient only with midline and needs PICC line for longer term antibiotic treatment. PICC line team consulted but stated due to patient's severe arm contractures PICC cannot be placed. PICC team recommended to consult IR for tunneled catheter amd IIR consulted on 9/25 and plan to place line on 9/28 with assistance of anesthesia. IR states to hold tube feedings at midnight on Sunday for line placement on 9/28.       Interval History: PICC line team consulted but stated due to patient's severe arm contractures PICC cannot be placed. PICC team recommended to consult IR for tunneled catheter amd IIR consulted on 9/25 and plan to place line on 9/28 with assistance of anesthesia. IR states to hold tube feedings at midnight on Sunday for line placement on 9/28. CM/SW working with family on SNF placement. Patient had fever yesterday afternoon at 1600 of 101 F. Patient with no fever since but need to closely monitor as if continues likely will need to re image abdomen.     Review of Systems   Unable to perform ROS: Patient nonverbal     Objective:     Vital Signs (Most Recent):  Temp: 98.8 °F (37.1 °C) (09/25/20 1617)  Pulse: 96 (09/25/20 1617)  Resp: 20 (09/25/20 1617)  BP: 114/67 (09/25/20 1617)  SpO2: 95 % (09/25/20 1617) Vital Signs  (24h Range):  Temp:  [98.4 °F (36.9 °C)-100 °F (37.8 °C)] 98.8 °F (37.1 °C)  Pulse:  [] 96  Resp:  [16-20] 20  SpO2:  [95 %-98 %] 95 %  BP: (105-127)/(67-96) 114/67     Weight: 33.1 kg (72 lb 15.6 oz)  Body mass index is 15.79 kg/m².    Intake/Output Summary (Last 24 hours) at 9/25/2020 1730  Last data filed at 9/24/2020 1953  Gross per 24 hour   Intake 500 ml   Output --   Net 500 ml      Physical Exam  Vitals signs and nursing note reviewed.   Constitutional:       General: He is not in acute distress.     Appearance: He is well-developed. He is cachectic. He is ill-appearing (Chronic).   Eyes:      Conjunctiva/sclera: Conjunctivae normal.   Neck:      Vascular: No JVD.   Cardiovascular:      Rate and Rhythm: Normal rate and regular rhythm.      Heart sounds: Normal heart sounds. No murmur. No friction rub. No gallop.    Pulmonary:      Effort: Pulmonary effort is normal. No respiratory distress.      Breath sounds: Normal breath sounds. No wheezing.   Abdominal:      General: Abdomen is flat. Bowel sounds are normal. There is no distension.      Palpations: Abdomen is soft.      Tenderness: There is no abdominal tenderness. There is no guarding.   Skin:     Capillary Refill: Capillary refill takes less than 2 seconds.      Findings: No erythema or rash.   Psychiatric:         Speech: He is noncommunicative.         Behavior: Behavior is cooperative.         Significant Labs:   CBC:   Recent Labs   Lab 09/24/20  0533 09/25/20  0423   WBC 7.65 10.57   HGB 9.6* 10.3*   HCT 30.7* 33.5*   * 634*     CMP:   Recent Labs   Lab 09/24/20  0533 09/25/20  0423    136   K 3.2* 4.4    104   CO2 26 25    118*   BUN 5* 5*   CREATININE 0.4* 0.5   CALCIUM 7.9* 8.0*   PROT 5.9* 6.5   ALBUMIN 1.7* 2.0*   BILITOT 0.1 0.1   ALKPHOS 99 104   AST 16 26   ALT 14 22   ANIONGAP 7* 7*   EGFRNONAA >60.0 >60.0     Magnesium:   Recent Labs   Lab 09/24/20  0533 09/25/20  0423   MG 1.8 2.0     Phosphorus  1.5    Significant Imaging: I have reviewed all pertinent imaging results/findings within the past 24 hours.      Assessment/Plan:      * Sepsis  Pneumoperitoneum  Colitis  · Sepsis improving with improved WBC but still febrile so need to monitor closely. If fevers continue likely will need to re-image abdomen. Blood cultures remain no growth.   · General surgery consulted on admit for pneumoperitoneum seen on admit CT scan of abdomen and pelvis. General surgery felt likely contained leak and very small bowel perforation related to recent colitis. Due to patient's very poor functional level at baseline and malnutrition felt to be very poor surgical candidate and recommended conservative.  · ID consulted and recommended IV Vancomycin, Cefepime and Flagyl to treat both small perforation and osteomyelitis of pelvic area and to extend abx until 10/22. IR to place tunneled catheter on 9/28 for long term antibiotics as current midline cannot be extended that long in future and bedside PICC team unable to place bedside PICC due to severe arm contractures. IR to coordinate with Anesthesia on 9/28 to place tunneled line. Hold tube feedings at midnight on Sunday, 9/27 for tunneled line on 9/28.   · ID recommends repeat CT scan of abdomen to re-evaluate perforation in 2-3 weeks or sooner if any acute worsening.  · WBC improving with abx but still with fever so will need to monitor closely as with patient's poor baseline function/noncommunicative status clinical assessment of abdominal worsening more difficult to assess.   · Blood cultures from admit remain no growth.     Decubitus ulcer of ischial area, left, stage IV  Pressure injury of right ischium, stage 4  Osteomyelitis of pelvis  · Wound care consulted and managing and wound care and preventive measures as per wound care recommendations.  · Patient now with exposed bone to ischial areas and ID with concern for underlying osteomyelitis even though wounds themselves do not  appear infected so recommend extension of long term abx with IV Vancomycin, Cefepime and Flagyl until at least 10/22. Will need PICC line as midline about ready to  as per ID recs.     Severe protein-calorie malnutrition  · Present on admit and related to colitis, recent medical illnesses. Patient with PEG and receiving TF with Prebio to help with nutrition. Likely cause of poor wound healing and development of ischial ulcers.   · Nutrition consulted and following and continue Peptamen with Prebio at 40 mL/hr as per nutrition recs to treat malnutrition.       Seizure disorder  Chronic and controlled and continue Phenobarbital to treat.       Anemia of chronic disease  Chronic and controlled and related to chronic infection, poor nutrition and poor baseline function status. Monitor anemia with daily CBC and transfuse if Hgb < 7.       Hypophosphatemia  Phosphorus level 1.5 on  consistent with hypophosphatemia. Will treat with Sodium Phosphate 20 mmol IV for 1 dose(s) today,  and recheck level in the am to monitor.    Cerebral palsy  Chronic condition. Patient with poor baseline functional status and non-communicative and bed bound/wheelchair bound with extremity contractures to all extremities at baseline.         VTE Risk Mitigation (From admission, onward)         Ordered     enoxaparin injection 30 mg  Every 24 hours      201                Discharge Planning   ROCÍO: 2020     Code Status: Prior   Is the patient medically ready for discharge?: No    Reason for patient still in hospital (select all that apply): Patient trending condition  Discharge Plan A: Long-term acute care facility (LTAC)   Discharge Delays: None known at this time        Nohemi Farris MD  Department of Hospital Medicine   [facility]

## 2020-09-25 NOTE — CONSULTS
GRACE consulted for PICC placement    PICC will be attempted after patient is 24 hrs afebrile (temp 101 9/24/2020 @7624

## 2020-09-25 NOTE — ASSESSMENT & PLAN NOTE
Phosphorus level 1.5 on 9/25 consistent with hypophosphatemia. Will treat with Sodium Phosphate 20 mmol IV for 1 dose(s) today, 9/25 and recheck level in the am to monitor.

## 2020-09-25 NOTE — PLAN OF CARE
Problem: Adult Inpatient Plan of Care  Goal: Plan of Care Review  Outcome: Ongoing, Progressing  Goal: Patient-Specific Goal (Individualization)  Outcome: Ongoing, Progressing  Flowsheets (Taken 9/25/2020 1806)  Individualized Care Needs: NICOLA  Anxieties, Fears or Concerns: NICOLA  Patient-Specific Goals (Include Timeframe): NICOLA  Goal: Optimal Comfort and Wellbeing  Outcome: Ongoing, Progressing  Intervention: Provide Person-Centered Care  Flowsheets (Taken 9/25/2020 1806)  Trust Relationship/Rapport:   emotional support provided   reassurance provided   thoughts/feelings acknowledged     Problem: Nutrition Impaired (Sepsis/Septic Shock)  Goal: Optimal Nutrition Intake  Outcome: Ongoing, Progressing  Intervention: Promote and Optimize Nutrition Delivery  Flowsheets (Taken 9/25/2020 1806)  Nutrition Support Management: tube feeding formula adjusted     Problem: Wound  Goal: Optimal Wound Healing  Outcome: Ongoing, Progressing  Intervention: Promote Effective Wound Healing  Flowsheets (Taken 9/25/2020 1806)  Oral Nutrition Promotion: safe use of adaptive equipment encouraged  Sleep/Rest Enhancement:   awakenings minimized   relaxation techniques promoted  Pain Management Interventions:   care clustered   pillow support provided   position adjusted     Pt nonverbal, VSS. Pt turned q2h, receiving IV antibiotics. Wound care completed to bilateral buttocks. Mother at bedside. Plan of care reviewed with mother. Will continue to monitor.

## 2020-09-25 NOTE — PHYSICIAN QUERY
PT Name: Glen Moscoso  MR #: [id number]     Documentation Clarification      CDS/: АНДРЕЙ Vega, RN, CCDS               Contact information: [email or web address]    This form is a permanent document in the medical record.     Query Date: September 25, 2020    By submitting this query, we are merely seeking further clarification of documentation. Please utilize your independent clinical judgment when addressing the question(s) below.    The Medical Record reflects the following:    Supporting Clinical Findings Location in Medical Record     Skin: Stage 2 L buttock DTI R .    Derm  Decubitus ulcer of left buttock, stage 2  - consult wound care for DTI     Orthopedic  Deep tissue injury  - R buttock   - Wound care consult    8/26 Neurocritical Care H&P by AMARI Perez/Dr. Marina                         Patient with history of cerebral palsy, hip surgery, seizures,acid reflux and peg tube placement.  Patient contracted. Deep tissue pressure injuries noted to bilateral ischiums, boggy to palpation, full thickness skin loss noted to left ischium.    Left ischium: dark purple discoloration with full thickness skin loss 90% dark purple discoloration, boggy to palpation, 10% yellow slough, redden periwound 5cm x 5cm x 0.3cm        Decubitus ulcer of left buttock, left ischial area, right ischial area, stage 4     Wounds present on admit and wound care, worsened since admission from stage 2 to 4 with picture documentation from wound care- mom reports had very bad stool issues a month ago causing these worsening wounds prior to admit   8/26 Wound Care Consult by ROBBIN Pabon                                                        9/18 Discharge Summary      National Pressure Ulcer Advisory Panel (2007) Pressure Ulcer Definitions & Stages:  Stage I:                     Intact skin with non-blanchable redness of a localized area usually over a bony prominence.   Stage II:                    Partial thickness loss of dermis  presenting as a shallow open ulcer with a red pink wound                                          bed, without slough.   Stage III:                   Full thickness tissue loss. Subcutaneous fat may be visible but bone, tendon or muscle is not                                    exposed. Slough may be present but does not obscure the depth of tissue loss. May include                                       undermining and tunneling.  Stage IV:                  Full thickness tissue loss with exposed bone, tendon or muscle. Slough or eschar may be                                         present on some parts of the wound bed. Often include undermining and tunneling.  Unstageable:          Full thickness tissue loss but base of ulcer is covered by slough and/or eschar in the wound                                      bed. Until enough slough and/or eschar is removed to expose wound base, its true depth, and                                    therefore stage, cannot be determined  Deep Tissue Injury: Purple or maroon localized area of discolored intact skin or blood-filled blister due to damage                                   of underlying soft tissue from pressure and/or shear.  Suspected deep tissue injuries may                                          develop into a stageable ulcer or turn out to be another diagnosis (e.g. an ecchymosis)                                                                            Provider, please provide diagnosis or diagnoses associated with above clinical findings.    Due to conflicting documentation on the H&P and wound care consult, please clarify the diagnosis of the left buttock integumentary on admission, 8/26/2020.    [X   ] Decubitus ulcer of left buttock, stage 2    [   ]  Deep tissue pressure injury   [   ]  Decubitus ulcer of left buttock, stage (please specify): __________    [   ] Other (please specify): ____________   [  ] Clinically undetermined

## 2020-09-25 NOTE — ASSESSMENT & PLAN NOTE
Pneumoperitoneum  Colitis  · Sepsis improving with improved WBC but still febrile so need to monitor closely. If fevers continue likely will need to re-image abdomen. Blood cultures remain no growth.   · General surgery consulted on admit for pneumoperitoneum seen on admit CT scan of abdomen and pelvis. General surgery felt likely contained leak and very small bowel perforation related to recent colitis. Due to patient's very poor functional level at baseline and malnutrition felt to be very poor surgical candidate and recommended conservative.  · ID consulted and recommended IV Vancomycin, Cefepime and Flagyl to treat both small perforation and osteomyelitis of pelvic area and to extend abx until 10/22. IR to place tunneled catheter on 9/28 for long term antibiotics as current midline cannot be extended that long in future and bedside PICC team unable to place bedside PICC due to severe arm contractures. IR to coordinate with Anesthesia on 9/28 to place tunneled line. Hold tube feedings at midnight on Sunday, 9/27 for tunneled line on 9/28.   · ID recommends repeat CT scan of abdomen to re-evaluate perforation in 2-3 weeks or sooner if any acute worsening.  · WBC improving with abx but still with fever so will need to monitor closely as with patient's poor baseline function/noncommunicative status clinical assessment of abdominal worsening more difficult to assess.   · Blood cultures from admit remain no growth.

## 2020-09-25 NOTE — PLAN OF CARE
09/25/20 1250   Discharge Reassessment   Assessment Type Discharge Planning Reassessment   Provided patient/caregiver education on the expected discharge date and the discharge plan Yes   Discharge Plan A Long-term acute care facility (LTAC)   Discharge Plan B Home Health   DME Needed Upon Discharge  other (see comments)  (TBD)   Anticipated Discharge Disposition LTAC   Post-Acute Status   Post-Acute Authorization Placement  (LTAC)   Post-Acute Placement Status Referrals Sent

## 2020-09-25 NOTE — PLAN OF CARE
Recommendations     1. Continue current TF regimen of Peptamen 1.5 Prebio @ 40 mL/hr - meeting needs.               - If bolus TFs desired for discharge, rec'd Peptamen 1.5 Prebio - 4 cans/day to provide 1500 kcals, 68 g of protein, 772 mL fluid.  2. RD to monitor & follow-up.     Goals: Meet % EEN, EPN by RD f/u date  Nutrition Goal Status: goal met  Communication of RD Recs: reviewed with RN

## 2020-09-25 NOTE — ANESTHESIA PREPROCEDURE EVALUATION
[facility]  Anesthesia Pre-Operative Evaluation         Patient Name: Glen Moscoso  YOB: 1995  MRN: [id number]    SUBJECTIVE:     Pre-operative evaluation for Procedure(s) (LRB):  Insertion,catheter,tunneled (Right)     2020    Glen Moscoso is a 24 y.o. male w/ a significant PMHx of CP, complicated by seizures and a peg tube, who presented to the ER for evaluation of fever on . He just had [place] prolonged and [place] [facility] [place] and pneumonia.  He was originally admitted to [facility], where he was found to have Pseudomonas and Serratia pneumonia, as well as a decubitus ulcer that was debrided by Gen Surg on  - cultures were not sent.  He was discharged home  on Zosyn via LUE PICC until . Upon arrival to the ER, vitals were temp 101.2F, , RR 24 and /75.  Labs showed WBC 17 with ESR 90 and Procal 0.56.  CXR didn't show a new consolidation.  His case was discussed with ID, who suggested Vanc, Cefepime, and Flagyl based on his previous cultures, as well as CT chest/abdomen/pelvis to include sacral wounds.  He was admitted to Hospital Medicine for further management. Patient with no further fever since admit on  and WBC improving. Plan to continue broad spectrum abx and conservative care for small colonic perforation. Patient back on home TF and tolerating.     His PICC however is about to  and the PICC team was unable to place a PICC at bedside prompting tunneled PICC placement by IR    Patient now presents for the above procedure(s).      LDA:        Peripheral IV - Single Lumen 20 1642 22 G Anterior;Right Forearm (Active)   Site Assessment Clean;Dry;Intact;No redness;No swelling 20   Line Status Flushed 20   Dressing Status Clean;Dry;Intact 20   Dressing Intervention Integrity maintained 20   Dressing Change Due 20  09/24/20 2000   Site Change Due 09/25/20 09/24/20 2000   Reason Not Rotated Not due 09/21/20 2000   Number of days: 3            Midline Catheter Insertion/Assessment  - Single Lumen 09/18/20 1650 Left brachial vein 18g x 8cm (Active)   Site Assessment Clean;Dry;Intact 09/25/20 0800   IV Device Securement catheter securement device 09/25/20 0800   Line Status Blood return noted;Flushed 09/25/20 0800   Dressing Type Biopatch in place 09/25/20 0800   Dressing Status Clean;Dry;Intact 09/25/20 0800   Dressing Intervention Integrity maintained 09/25/20 0800   Dressing Change Due 09/25/20 09/25/20 0800   Site Change Due 09/25/20 09/25/20 0800   Reason Not Rotated Not due 09/22/20 0600   Number of days: 6            Gastrostomy/Enterostomy 08/04/20 1653 Percutaneous endoscopic gastrostomy (PEG) feeding (Active)   Securement secured to abdomen 09/17/20 2000   Interventions Prior to Feeding residual checked;patency checked 09/17/20 2000   Suction Setting/Drainage Method dependent drainage 09/17/20 2000   Drainage milky 08/30/20 0400   Feeding Type continuous 09/17/20 2000   Clamp Status/Tolerance unclamped 09/17/20 2000   Feeding Action feeding continued 09/17/20 2000   Dressing dry and intact 09/17/20 2000   Insertion Site no redness;no warmth;no drainage 09/17/20 2000   Site Care site cleansed w/ sterile normal saline 09/17/20 2000   Flush/Irrigation flushed w/;sterile normal saline 09/17/20 2000   Current Rate (mL/hr) 40 mL/hr 09/17/20 2000   Goal Rate (mL/hr) 40 mL/hr 09/17/20 2000   Intake (mL) 270 mL 09/12/20 0840   Water Bolus (mL) 150 mL 09/13/20 0000   Tube Output(mL)(Include Discarded Residual) 0 mL 09/14/20 1400   Intake (mL) - Breast Milk Tube Feeding 200 09/13/20 0000   Formula Name Peptamen 1.5 09/17/20 2000   Tube Feeding Intake (mL) 480 09/14/20 0600   Residual Amount (ml) 0 ml 09/13/20 2000   Number of days: 51       Prev airway: None documented.    Drips: None documented.      Patient Active Problem List    Diagnosis    Cerebral palsy    Proctocolitis    Seizure disorder    Bacterial infection due to Serratia    Anemia of chronic disease    Decubitus ulcer of ischial area, left, stage IV    Pressure injury of right ischium, stage 4    Sepsis    Pneumoperitoneum    Severe protein-calorie malnutrition    Colitis    Osteomyelitis of pelvis       Review of patient's allergies indicates:  No Known Allergies    Current Inpatient Medications:   ascorbic acid (vitamin C)  500 mg Per G Tube Daily    baclofen  10 mg Per G Tube TID    ceFEPime (MAXIPIME) IVPB  2 g Intravenous Q12H    enoxaparin  30 mg Subcutaneous Q24H    gabapentin  250 mg Per G Tube QHS    Lactobacillus rhamnosus GG  1 capsule Per G Tube Daily    loperamide  2 mg Per G Tube QID    metroNIDAZOLE  500 mg Per G Tube TID    multivitamin liquid no.118  10 mL Per G Tube Daily    PHENobarbitaL  100 mg Per G Tube QHS    propranoloL  20 mg Per G Tube TID    vancomycin (VANCOCIN) IVPB  750 mg Intravenous Q12H       No current facility-administered medications on file prior to encounter.      Current Outpatient Medications on File Prior to Encounter   Medication Sig Dispense Refill    ascorbic acid, vitamin C, (VITAMIN C) 500 MG tablet 1 tablet (500 mg total) by Per G Tube route once daily.      baclofen (LIORESAL) 10 MG tablet 1 tablet (10 mg total) by Per G Tube route 3 (three) times daily.      cholestyramine (QUESTRAN) 4 gram packet Take 1 packet (4 g total) by mouth 2 (two) times daily. 60 packet 0    diphenhydrAMINE (BENADRYL) 12.5 mg/5 mL elixir 10 mLs (25 mg total) by Per G Tube route 4 (four) times daily as needed for Allergies.  0    gabapentin (NEURONTIN) 250 mg/5 mL solution 5 mLs (250 mg total) by Per G Tube route nightly. At bedtime      ibuprofen (ADVIL,MOTRIN) 100 mg/5 mL suspension 10 mLs (200 mg total) by Per G Tube route every 6 (six) hours as needed for Pain or Temperature greater than.  0    Lactobacillus rhamnosus GG  (CULTURELLE) 10 billion cell capsule 1 capsule by Per G Tube route once daily.      loperamide (IMODIUM) 1 mg/7.5 mL solution 15 mLs (2 mg total) by Per G Tube route 4 (four) times daily. for 10 days  0    multivitamin liquid no.118 Liqd 10 mLs by Per G Tube route once daily.      nystatin (MYCOSTATIN) cream Apply topically 2 (two) times daily.      PHENobarbitaL 20 mg/5 mL (4 mg/mL) Elix elixir Take 25 mLs (100 mg total) by mouth every evening.      propranoloL (INDERAL) 20 MG tablet 1 tablet (20 mg total) by Per G Tube route 3 (three) times daily.         Past Surgical History:   Procedure Laterality Date    CHOLECYSTECTOMY      FLEXIBLE SIGMOIDOSCOPY N/A 9/9/2020    Procedure: SIGMOIDOSCOPY, FLEXIBLE;  Surgeon: America Goode MD;  Location: [address];  Service: Endoscopy;  Laterality: N/A;    HIP SURGERY         Social History     Socioeconomic History    Marital status: Single     Spouse name: Not on file    Number of children: Not on file    Years of education: Not on file    Highest education level: Not on file   Occupational History    Not on file   Social Needs    Financial resource strain: Not on file    Food insecurity     Worry: Not on file     Inability: Not on file    Transportation needs     Medical: Not on file     Non-medical: Not on file   Tobacco Use    Smoking status: Never Smoker    Smokeless tobacco: Never Used   Substance and Sexual Activity    Alcohol use: Never     Frequency: Never    Drug use: Not Currently    Sexual activity: Not on file   Lifestyle    Physical activity     Days per week: Not on file     Minutes per session: Not on file    Stress: Not on file   Relationships    Social connections     Talks on phone: Not on file     Gets together: Not on file     Attends Zoroastrianism service: Not on file     Active member of club or organization: Not on file     Attends meetings of clubs or organizations: Not on file     Relationship status: Not on file    Other Topics Concern    Not on file   Social History Narrative    Not on file       OBJECTIVE:     Vital Signs Range (Last 24H):  Temp:  [36.9 °C (98.4 °F)-38.4 °C (101.1 °F)]   Pulse:  []   Resp:  [16-20]   BP: (105-131)/(71-96)   SpO2:  [95 %-98 %]       Significant Labs:  Lab Results   Component Value Date    WBC 10.57 09/25/2020    HGB 10.3 (L) 09/25/2020    HCT 33.5 (L) 09/25/2020     (H) 09/25/2020    ALT 22 09/25/2020    AST 26 09/25/2020     09/25/2020    K 4.4 09/25/2020     09/25/2020    CREATININE 0.5 09/25/2020    BUN 5 (L) 09/25/2020    CO2 25 09/25/2020    INR 1.1 09/01/2020    HGBA1C 5.5 08/26/2020       Diagnostic Studies: No relevant studies.    EKG:   Results for orders placed or performed during the hospital encounter of 09/20/20   EKG 12-lead    Collection Time: 09/20/20  6:56 PM    Narrative    Test Reason : A41.9,    Vent. Rate : 112 BPM     Atrial Rate : 116 BPM     P-R Int : 000 ms          QRS Dur : 066 ms      QT Int : 300 ms       P-R-T Axes : 000 047 007 degrees     QTc Int : 410 ms    Age and gender specific analysis  Sinus tachycardia with occasional Premature ventricular complexes  Nonspecific T wave abnormality  Abnormal ECG  When compared with ECG of 30-AUG-2020 07:00,    Nonspecific T wave abnormality has replaced inverted T waves in Inferior  leads  T wave inversion no longer evident in Anterior leads  Confirmed by DEVONTE STEARNS MD ([id number]) on 9/21/2020 11:49:21 AM    Referred By: [email or web address]   SELF           Confirmed By:DEVONTE STEARNS MD       2D ECHO:  TTE:  Results for orders placed or performed during the hospital encounter of 08/26/20   Echo Color Flow Doppler? Yes   Result Value Ref Range    Ascending aorta 2.56 cm    STJ 2.62 cm    AV mean gradient 2 mmHg    Ao peak josé antonio 0.76 m/s    Ao VTI 10.42 cm    IVS 0.60 (A) 0.6 - 1.1 cm    LA size 2.15 cm    Left Atrium Major Axis 2.76 cm    LVIDd 4.00 3.5 - 6.0 cm    LVIDs 3.20 2.1 - 4.0 cm    LVOT diameter  2.02 cm    LVOT peak VTI 9.19 cm    Posterior Wall 0.50 (A) 0.6 - 1.1 cm    RA Major Axis 2.54 cm    RA Width 2.33 cm    RVDD 2.31 cm    Sinus 2.22 cm    TAPSE 1.26 cm    LA WIDTH 2.56 cm    LV Diastolic Volume 50.78 mL    LV Systolic Volume 22.47 mL    LVOT peak josé antonio 0.62 m/s    FS 20 %    LV mass 57.72 g    Left Ventricle Relative Wall Thickness 0.25 cm    AV valve area 2.83 cm2    AV Velocity Ratio 0.82     AV index (prosthetic) 0.88     LVOT area 3.2 cm2    LVOT stroke volume 29.44 cm3    AV peak gradient 2 mmHg    LV Systolic Volume Index 19.2 mL/m2    LV Diastolic Volume Index 43.33 mL/m2    LV Mass Index 49 g/m2    BSA 1.15 m2    Narrative    · Low normal left ventricular systolic function. The estimated ejection   fraction is 45-50%.  · Normal right ventricular systolic function.  · Mechanically ventilated; cannot use inferior caval vein diameter to   estimate central venous pressure.          TALI:  No results found for this or any previous visit.    ASSESSMENT/PLAN:         Anesthesia Evaluation    I have reviewed the Patient Summary Reports.    I have reviewed the Nursing Notes.    I have reviewed the Medications.     Review of Systems  Social:  Non-Smoker, No Alcohol Use    Hematology/Oncology:     Oncology Normal    -- Anemia: Hematology Comments: Hemoglobin 6.9    EENT/Dental:EENT/Dental Normal   Cardiovascular:   Denies Hypertension.  Denies CABG/stent.  Denies Dysrhythmias.  CHF    Pulmonary:   Pneumonia Denies COPD.    Renal/:  Renal/ Normal     Hepatic/GI:   GERD    Musculoskeletal:  Musculoskeletal Normal    Neurological:   Neuromuscular Disease, Seizures NONVERBAL   Endocrine:  Endocrine Normal    Psych:  Psychiatric Normal           Physical Exam  General:  Cachexia    Airway/Jaw/Neck:  Airway Findings: Mallampati: II Improves to I with phonation.  TM Distance: Normal, at least 6 cm  Difficult to examine 2/2 mental status    Dental:  DENTAL FINDINGS: NormalHard to examine 2/2 mental status    Chest/Lungs:  Chest/Lungs Findings: Rhonchi     Heart/Vascular:  Heart Findings: Rate: Normal  Rhythm: Regular Rhythm        Mental Status:  Mental Status Findings:  Confusion         Anesthesia Plan  Type of Anesthesia, risks & benefits discussed:  Anesthesia Type:  regional, MAC, general  Patient's Preference:   Intra-op Monitoring Plan: standard ASA monitors  Intra-op Monitoring Plan Comments:   Post Op Pain Control Plan: multimodal analgesia  Post Op Pain Control Plan Comments:   Induction:   IV  Beta Blocker:  Patient is on a Beta-Blocker and has received one dose within the past 24 hours (No further documentation required).       Informed Consent: Patient representative understands risks and agrees with Anesthesia plan.  Questions answered. Anesthesia consent signed with patient representative.  ASA Score: 3     Day of Surgery Review of History & Physical:    H&P update referred to the surgeon.         Ready For Surgery From Anesthesia Perspective.

## 2020-09-25 NOTE — CONSULTS
GRACE consulted for PICC    Spoke with Dr. Farris- we are unable to place PICC at bedside, safest and best option would be for patient to receive tunneled PICC by IR

## 2020-09-26 LAB
ALBUMIN SERPL BCP-MCNC: 1.9 G/DL (ref 3.5–5.2)
ALP SERPL-CCNC: 98 U/L (ref 55–135)
ALT SERPL W/O P-5'-P-CCNC: 25 U/L (ref 10–44)
ANION GAP SERPL CALC-SCNC: 7 MMOL/L (ref 8–16)
AST SERPL-CCNC: 28 U/L (ref 10–40)
BASOPHILS # BLD AUTO: 0.02 K/UL (ref 0–0.2)
BASOPHILS NFR BLD: 0.2 % (ref 0–1.9)
BILIRUB SERPL-MCNC: 0.1 MG/DL (ref 0.1–1)
BUN SERPL-MCNC: 6 MG/DL (ref 6–20)
CALCIUM SERPL-MCNC: 8.2 MG/DL (ref 8.7–10.5)
CHLORIDE SERPL-SCNC: 104 MMOL/L (ref 95–110)
CO2 SERPL-SCNC: 27 MMOL/L (ref 23–29)
CREAT SERPL-MCNC: 0.5 MG/DL (ref 0.5–1.4)
DIFFERENTIAL METHOD: ABNORMAL
EOSINOPHIL # BLD AUTO: 0 K/UL (ref 0–0.5)
EOSINOPHIL NFR BLD: 0 % (ref 0–8)
ERYTHROCYTE [DISTWIDTH] IN BLOOD BY AUTOMATED COUNT: 15.2 % (ref 11.5–14.5)
EST. GFR  (AFRICAN AMERICAN): >60 ML/MIN/1.73 M^2
EST. GFR  (NON AFRICAN AMERICAN): >60 ML/MIN/1.73 M^2
GLUCOSE SERPL-MCNC: 109 MG/DL (ref 70–110)
HCT VFR BLD AUTO: 32.1 % (ref 40–54)
HGB BLD-MCNC: 9.8 G/DL (ref 14–18)
IMM GRANULOCYTES # BLD AUTO: 0.03 K/UL (ref 0–0.04)
IMM GRANULOCYTES NFR BLD AUTO: 0.3 % (ref 0–0.5)
LYMPHOCYTES # BLD AUTO: 1.8 K/UL (ref 1–4.8)
LYMPHOCYTES NFR BLD: 17 % (ref 18–48)
MAGNESIUM SERPL-MCNC: 2 MG/DL (ref 1.6–2.6)
MCH RBC QN AUTO: 29.9 PG (ref 27–31)
MCHC RBC AUTO-ENTMCNC: 30.5 G/DL (ref 32–36)
MCV RBC AUTO: 98 FL (ref 82–98)
MONOCYTES # BLD AUTO: 1.1 K/UL (ref 0.3–1)
MONOCYTES NFR BLD: 10.3 % (ref 4–15)
NEUTROPHILS # BLD AUTO: 7.8 K/UL (ref 1.8–7.7)
NEUTROPHILS NFR BLD: 72.2 % (ref 38–73)
NRBC BLD-RTO: 0 /100 WBC
PHOSPHATE SERPL-MCNC: 3 MG/DL (ref 2.7–4.5)
PLATELET # BLD AUTO: 586 K/UL (ref 150–350)
PMV BLD AUTO: 9.4 FL (ref 9.2–12.9)
POTASSIUM SERPL-SCNC: 4.1 MMOL/L (ref 3.5–5.1)
PROT SERPL-MCNC: 6.3 G/DL (ref 6–8.4)
RBC # BLD AUTO: 3.28 M/UL (ref 4.6–6.2)
SODIUM SERPL-SCNC: 138 MMOL/L (ref 136–145)
WBC # BLD AUTO: 10.79 K/UL (ref 3.9–12.7)

## 2020-09-26 PROCEDURE — 99900035 HC TECH TIME PER 15 MIN (STAT)

## 2020-09-26 PROCEDURE — 25000003 PHARM REV CODE 250: Performed by: INTERNAL MEDICINE

## 2020-09-26 PROCEDURE — 85025 COMPLETE CBC W/AUTO DIFF WBC: CPT

## 2020-09-26 PROCEDURE — 36415 COLL VENOUS BLD VENIPUNCTURE: CPT

## 2020-09-26 PROCEDURE — 83735 ASSAY OF MAGNESIUM: CPT

## 2020-09-26 PROCEDURE — 84100 ASSAY OF PHOSPHORUS: CPT

## 2020-09-26 PROCEDURE — 94668 MNPJ CHEST WALL SBSQ: CPT

## 2020-09-26 PROCEDURE — 20600001 HC STEP DOWN PRIVATE ROOM

## 2020-09-26 PROCEDURE — 99232 SBSQ HOSP IP/OBS MODERATE 35: CPT | Mod: ,,, | Performed by: INTERNAL MEDICINE

## 2020-09-26 PROCEDURE — 31720 CLEARANCE OF AIRWAYS: CPT

## 2020-09-26 PROCEDURE — 80053 COMPREHEN METABOLIC PANEL: CPT

## 2020-09-26 PROCEDURE — 25000003 PHARM REV CODE 250: Performed by: HOSPITALIST

## 2020-09-26 PROCEDURE — 94761 N-INVAS EAR/PLS OXIMETRY MLT: CPT

## 2020-09-26 PROCEDURE — 63600175 PHARM REV CODE 636 W HCPCS: Performed by: HOSPITALIST

## 2020-09-26 PROCEDURE — 99232 PR SUBSEQUENT HOSPITAL CARE,LEVL II: ICD-10-PCS | Mod: ,,, | Performed by: INTERNAL MEDICINE

## 2020-09-26 RX ADMIN — METRONIDAZOLE 500 MG: 500 TABLET ORAL at 08:09

## 2020-09-26 RX ADMIN — GABAPENTIN 250 MG: 250 SOLUTION ORAL at 10:09

## 2020-09-26 RX ADMIN — VANCOMYCIN HYDROCHLORIDE 750 MG: 750 INJECTION, POWDER, LYOPHILIZED, FOR SOLUTION INTRAVENOUS at 10:09

## 2020-09-26 RX ADMIN — VANCOMYCIN HYDROCHLORIDE 750 MG: 750 INJECTION, POWDER, LYOPHILIZED, FOR SOLUTION INTRAVENOUS at 08:09

## 2020-09-26 RX ADMIN — BACLOFEN 10 MG: 10 TABLET ORAL at 08:09

## 2020-09-26 RX ADMIN — Medication 10 ML: at 09:09

## 2020-09-26 RX ADMIN — PROPRANOLOL HYDROCHLORIDE 20 MG: 20 TABLET ORAL at 08:09

## 2020-09-26 RX ADMIN — BACLOFEN 10 MG: 10 TABLET ORAL at 03:09

## 2020-09-26 RX ADMIN — PROPRANOLOL HYDROCHLORIDE 20 MG: 20 TABLET ORAL at 03:09

## 2020-09-26 RX ADMIN — PROPRANOLOL HYDROCHLORIDE 20 MG: 20 TABLET ORAL at 10:09

## 2020-09-26 RX ADMIN — BACLOFEN 10 MG: 10 TABLET ORAL at 10:09

## 2020-09-26 RX ADMIN — Medication 1 CAPSULE: at 08:09

## 2020-09-26 RX ADMIN — CEFEPIME 2 G: 2 INJECTION, POWDER, FOR SOLUTION INTRAVENOUS at 10:09

## 2020-09-26 RX ADMIN — LOPERAMIDE HYDROCHLORIDE 2 MG: 1 SOLUTION ORAL at 05:09

## 2020-09-26 RX ADMIN — LOPERAMIDE HYDROCHLORIDE 2 MG: 1 SOLUTION ORAL at 08:09

## 2020-09-26 RX ADMIN — LOPERAMIDE HYDROCHLORIDE 2 MG: 1 SOLUTION ORAL at 01:09

## 2020-09-26 RX ADMIN — PHENOBARBITAL 100 MG: 20 ELIXIR ORAL at 10:09

## 2020-09-26 RX ADMIN — LOPERAMIDE HYDROCHLORIDE 2 MG: 1 SOLUTION ORAL at 10:09

## 2020-09-26 RX ADMIN — METRONIDAZOLE 500 MG: 500 TABLET ORAL at 03:09

## 2020-09-26 RX ADMIN — Medication 500 MG: at 08:09

## 2020-09-26 RX ADMIN — METRONIDAZOLE 500 MG: 500 TABLET ORAL at 10:09

## 2020-09-26 RX ADMIN — ENOXAPARIN SODIUM 30 MG: 30 INJECTION SUBCUTANEOUS at 05:09

## 2020-09-26 NOTE — ASSESSMENT & PLAN NOTE
Pneumoperitoneum  Colitis  · Sepsis resolved with WBC normal and patient afebrile for past 48 hours. If fevers recur then likely will need to re-image abdomen. Blood cultures remain no growth.   · General surgery consulted on admit for pneumoperitoneum seen on admit CT scan of abdomen and pelvis. General surgery felt likely contained leak and very small bowel perforation related to recent colitis. Due to patient's very poor functional level at baseline and malnutrition felt to be very poor surgical candidate and recommended conservative.  · ID consulted and recommended IV Vancomycin, Cefepime and Flagyl to treat both small perforation and osteomyelitis of pelvic area and to extend abx until 10/22. IR to place tunneled catheter on 9/28 for long term antibiotics as current midline cannot be extended that long in future and bedside PICC team unable to place bedside PICC due to severe arm contractures. IR to coordinate with Anesthesia on 9/28 to place tunneled line. Hold tube feedings at midnight on Sunday, 9/27 for tunneled line on 9/28.   · ID recommends repeat CT scan of abdomen to re-evaluate perforation in 2-3 weeks or sooner if any acute worsening.  · WBC improving with abx but still with fever so will need to monitor closely as with patient's poor baseline function/noncommunicative status clinical assessment of abdominal worsening more difficult to assess.   · Patient with no further diarrhea.

## 2020-09-26 NOTE — ASSESSMENT & PLAN NOTE
"Pressure injury of right ischium, stage 4  Osteomyelitis of pelvis  · Wound care consulted and managing and wound care and preventive measures as per wound care recommendations. Wounds appear clean with no definite signs of infection.   · As per wound care "Nursing to cleanse scrotal skin breakdown and R and L ischial pressure injuries with wound cleanser. Pack ischial pressure injuries with Triad ointment and gauze, then cover bordered foam dressing to prove autolytic debridement and prevent breakdown of intact skin. Apply Triad ointment over scrotal skin breakdown."  · Patient now with exposed bone to ischial areas and ID with concern for underlying osteomyelitis even though wounds themselves do not appear infected so recommend extension of long term abx with IV Vancomycin, Cefepime and po Flagyl until at least 10/22. Will need PICC line as midline about ready to  as per ID recs and IR to place tunneled line on .   "

## 2020-09-26 NOTE — PLAN OF CARE
Problem: Adult Inpatient Plan of Care  Goal: Plan of Care Review  Outcome: Ongoing, Progressing  Goal: Patient-Specific Goal (Individualization)  Outcome: Ongoing, Progressing  Flowsheets (Taken 9/26/2020 1720)  Individualized Care Needs: NICOLA  Anxieties, Fears or Concerns: NICOLA  Patient-Specific Goals (Include Timeframe): NICOLA  Goal: Optimal Comfort and Wellbeing  Outcome: Ongoing, Progressing  Intervention: Provide Person-Centered Care  Flowsheets (Taken 9/26/2020 1720)  Trust Relationship/Rapport:   care explained   choices provided   emotional support provided   empathic listening provided   questions answered   questions encouraged   thoughts/feelings acknowledged     Problem: Infection (Sepsis/Septic Shock)  Goal: Absence of Infection Signs/Symptoms  Outcome: Ongoing, Progressing  Intervention: Prevent or Manage Infection Progression  Flowsheets (Taken 9/26/2020 1720)  Infection Prevention:   equipment surfaces disinfected   rest/sleep promoted  Infection Management: aseptic technique maintained     Problem: Wound  Goal: Optimal Wound Healing  Outcome: Ongoing, Progressing  Intervention: Promote Effective Wound Healing  Flowsheets (Taken 9/26/2020 1720)  Oral Nutrition Promotion:   rest periods promoted   safe use of adaptive equipment encouraged  Sleep/Rest Enhancement: awakenings minimized  Pain Management Interventions:   pillow support provided   position adjusted     Pt awake and alert, nonverbal, VSS. No acute changes today. Wound care completed. Pt had 2 liquid BMs today. Tube feedings running at 40/hr. No residual noted. Plan of care reviewed with mother. Will continue to monitor.

## 2020-09-26 NOTE — PHYSICIAN QUERY
PT Name: Glen Moscoso  MR #: [id number]     Documentation Clarification      CDS: Tomasa Shay RN, CCDS         Contact information :ext [phone] [email or web address]       This form is a permanent document in the medical record.     Query Date: September 26, 2020    By submitting this query, we are merely seeking further clarification of documentation. Please utilize your independent clinical judgment when addressing the question(s) below.    The Medical Record reflects the following:    Supporting Clinical Findings Location in Medical Record     Sepsis  4/4 SIRS:  Temp 101.2F, , RR 24, WBC 17  Source unclear at this time - possibly 2/2 infected decubitus?  Has been on Zosyn outpatient for recently diagnosed Pseudomonas and Serratia pneumonia with end date of 9/24  ID consulted:  Suggest CT chest/abdomen/pelvis as well as Vanc, Cefepime, and Flagyl.  Can consider Meropenem in the event of instability, but need to cautiously in the setting of known seizures    New small volume pneumoperitoneum predominantly along the anterior aspect of the mid abdomen.    Inflammatory changes and wall thickening involving the ascending and rectosigmoid colon suggestive of a nonspecific colitis.  Debris in the left mainstem and left lower lobe bronchus suggestive of retained secretions/mucus or aspiration.  Chronic changes involving the pelvic bones.     Sepsis  Pneumoperitoneum  Colitis  Osteomyelitis of pelvis  Sepsis improving with improved WBC but still febrile so need to monitor closely. If fevers continue likely will need to re-image abdomen. Blood cultures remain no growth.   Patient now with exposed bone to ischial areas and ID with concern for underlying osteomyelitis even though wounds themselves do not appear infected so recommend extension of long term abx with IV Vancomycin, Cefepime and Flagyl until at least 10/22   H&P 9/20/20                    CT pelvis 9/20/20                    Hospital medicine PB  9/25/20                                                                                Provider, please provide diagnosis or diagnoses associated with above clinical findings.  Please document organism(s) associated with the Sepsis diagnosis.    [   ] Pseudomonas   [   ] Serratia   [   ] Other (please specify): ____________   [  x ] Unspecified   [  ] Clinically undetermined

## 2020-09-26 NOTE — ASSESSMENT & PLAN NOTE
· Improved level at 3.0 on 9/26.   · Phosphorus level 1.5 on 9/25 consistent with hypophosphatemia and treated with Sodium Phosphate 20 mmol IV for 1 dose(s).  · Monitor daily Phosphorus level and replace prn.

## 2020-09-26 NOTE — SUBJECTIVE & OBJECTIVE
"Interval History: Patient's mother, Leni at bedside. His mother had her ureteroscope yesterday with Urology and had her ureteral stone removed and stent placed and nephrostomy tube removed. She is feeling tired and sore and going home tonight to get some rest as she stayed in hospital overnight and in fact right after her procedure she came straight up to Glen's room. I discussed Glen's case with her. I am still concerned about Glen's colon situation. His WBC is stable and he is afebrile but needs close monitoring and repeat CT imaging in 2-3 weeks for monitoring. I explained plan on Monday is to have IR place a tunneled catheter for long term antibiotics. She stated anesthesia came by yesterday as IR doing procedure with Anesthesia and she signed Anesthesia consents. I explained to mother that IR will be contacting her about consent for tunneled line and she states she will be happy to consent and make sure she has her phone with her at all times as she states she likely will not be able to be here Monday for actual procedure.   I explained to her that ANDRE is working on LTAC placement for her son as he needs intensive medical management of his various medical conditions including complex wound care with frequent turning, 2 IV antibiotics with monitoring as well as close monitoring of his abdominal exam. I explained to her what LTAC was and she is agreeable but states "he is not going to any NH". I explained LTAC is not a nursing home. She states she wants him home as soon as possible but understands right now due to intensity of medical care an LTAC is a better choice for him on discharge instead of going home with Home Health but that would be Plan B. She asked that Elisa Pabon ([phone]) be called to be updated as she is patient's  for the [facility] program that provides assistance and care for Glen at home.     Review of Systems   Unable to perform ROS: Patient nonverbal     Objective: "     Vital Signs (Most Recent):  Temp: 99.1 °F (37.3 °C) (09/26/20 1220)  Pulse: 109 (09/26/20 1220)  Resp: 18 (09/26/20 1220)  BP: 129/70 (09/26/20 1220)  SpO2: (!) 93 % (09/26/20 1220) Vital Signs (24h Range):  Temp:  [97.2 °F (36.2 °C)-99.1 °F (37.3 °C)] 99.1 °F (37.3 °C)  Pulse:  [] 109  Resp:  [18-20] 18  SpO2:  [93 %-98 %] 93 %  BP: (111-130)/(64-70) 129/70     Weight: 33.1 kg (72 lb 15.6 oz)  Body mass index is 15.79 kg/m².  No intake or output data in the 24 hours ending 09/26/20 1251   Physical Exam  Vitals signs and nursing note reviewed.   Constitutional:       General: He is not in acute distress.     Appearance: He is well-developed. He is cachectic. He is ill-appearing (Chronic).   Eyes:      Conjunctiva/sclera: Conjunctivae normal.   Neck:      Vascular: No JVD.   Cardiovascular:      Rate and Rhythm: Normal rate and regular rhythm.      Heart sounds: Normal heart sounds. No murmur. No friction rub. No gallop.    Pulmonary:      Effort: Pulmonary effort is normal. No respiratory distress.      Breath sounds: Normal breath sounds. No wheezing.   Abdominal:      General: Abdomen is flat. Bowel sounds are normal. There is no distension.      Palpations: Abdomen is soft.      Tenderness: There is no abdominal tenderness. There is no guarding.   Skin:     Capillary Refill: Capillary refill takes less than 2 seconds.      Findings: No erythema or rash.   Psychiatric:         Speech: He is noncommunicative.         Behavior: Behavior is cooperative.         Significant Labs:   CBC:   Recent Labs   Lab 09/25/20 0423 09/26/20  0352   WBC 10.57 10.79   HGB 10.3* 9.8*   HCT 33.5* 32.1*   * 586*     CMP:   Recent Labs   Lab 09/25/20 0423 09/26/20  0352    138   K 4.4 4.1    104   CO2 25 27   * 109   BUN 5* 6   CREATININE 0.5 0.5   CALCIUM 8.0* 8.2*   PROT 6.5 6.3   ALBUMIN 2.0* 1.9*   BILITOT 0.1 0.1   ALKPHOS 104 98   AST 26 28   ALT 22 25   ANIONGAP 7* 7*   EGFRNONAA >60.0 >60.0      Magnesium:   Recent Labs   Lab 09/25/20  0423 09/26/20  0352   MG 2.0 2.0     Phosphorus 3.0    Significant Imaging: I have reviewed all pertinent imaging results/findings within the past 24 hours.

## 2020-09-26 NOTE — PHYSICIAN QUERY
PT Name: Glen Moscoso  MR #: [id number]     ACUITY OF CONDITION CLARIFICATION      CDS: Tomasa Shay RN, CCDS         Contact information :ext [phone] [email or web address]     This form is a permanent document in the medical record.     Query Date: September 26, 2020    By submitting this query, we are merely seeking further clarification of documentation to reflect the severity of illness of your patient. Please utilize your independent clinical judgment when addressing the question(s) below.    The Medical record reflects the following:     Indicators   Supporting Clinical Findings Location in Medical Record   x Documentation of condition Osteomyelitis of pelvis Hospital Medicine PN 9/25/20    Lab Value(s)     x Radiology Findings Chronic changes involving the pelvic bones.  CT Pelvia 9/20/20   x Treatment/Medication ID recommending to extend current antibiotic regimen to treat bowel perforation and pelvic osteomyelitis until 10/22. Hospital Medicine PN 9/25/20    Other        Provider, please specify the acuity/chronicity of Osteomyelitis of pelvis :    [   ] Acute   [   ] Chronic   [  x ] Acute on chronic   [   ] Other clarification, ____________   [   ]  Clinically Undetermined           Please document in your progress notes daily for the duration of treatment until resolved, and include in your discharge summary.

## 2020-09-26 NOTE — PROGRESS NOTES
[facility] Hospital Medicine  Progress Note    Patient Name: Glen Moscoso  MRN: [id number]  Patient Class: IP- Inpatient   Admission Date: 9/20/2020  Length of Stay: 6 days  Attending Physician: Nohemi Farris MD  Primary Care Provider: Yadi Lawosn MD    Hospital Medicine Team: [facility] HOSP MED K Nohemi Farris MD    Subjective:     Principal Problem:Sepsis        HPI:  Mr. Glen Moscoso is a 24 y.o. male with CP, complicated by seizures and a peg tube, who presents to the ER for evaluation of fever.  He just had a prolonged and complex hospital stay at [facility] from 8/26-9/18 for seizures and pneumonia.  He was originally admitted to [facility], where he was found to have Pseudomonas and Serratia pneumonia, as well as a decubitus ulcer that was debrided by Gen Surg on 9/7 - cultures were not sent.  He was discharged home 9/18 on Zosyn via LUE PICC until 9/24.  Family at bedside reports he was doing well until the day of admission when he started to have fevers up to 101.2F.  They deny any new respiratory complaints.  Given his recent hospitalization and fever while on antibiotics, they brought him to the ER for further evaluation.     Upon arrival to the ER, vitals were temp 101.2F, , RR 24 and /75.  Labs showed WBC 17 with ESR 90 and Procal 0.56.  CXR didn't show a new consolidation.  His case was discussed with ID, who suggested Vanc, Cefepime, and Flagyl based on his previous cultures, as well as CT chest/abdomen/pelvis to include sacral wounds.  He was admitted to Hospital Medicine for further management.    Overview/Hospital Course:  CT scan of abdomen and pelvis done on admit showed overnight small amount of free air and general surgery consulted and evaluated patient who recommended conservative management with antibiotics and close monitoring as felt high surgical risk and likely contained small colonic perforation. ID consulted and patient placed on IV Cefepime/Flagyl and  Vancomycin. G tube study done and no air leak noted. Patient on long term abx as outpatient for infected bilateral Stage IV ischial ulcers and recent bout of Serratia and Pseudomonal pneumonia. Antibiotics and wound care for ulcers continued on this admit. Patient with severe cerebral palsy and bed bound/wheelchair bound at baseline. Blood cultures from admit are no growth to date. Patient with no further fever since admit on 9/24 and WBC improving. Plan to continue broad spectrum abx and conservative care for small colonic perforation. Patient back on home TF and tolerating. ID recommending to extend current antibiotic regimen to treat bowel perforation and pelvic osteomyelitis until 10/22. Patient only with midline and needs PICC line for longer term antibiotic treatment. PICC line team consulted but stated due to patient's severe arm contractures PICC cannot be placed. PICC team recommended to consult IR for tunneled catheter amd IIR consulted on 9/25 and plan to place line on 9/28 with assistance of anesthesia. IR states to hold tube feedings at midnight on Sunday for line placement on 9/28.       Interval History: Patient's mother, Leni at bedside. His mother had her ureteroscope yesterday with Urology and had her ureteral stone removed and stent placed and nephrostomy tube removed. She is feeling tired and sore and going home tonight to get some rest as she stayed in hospital overnight and in fact right after her procedure she came straight up to Glen's room. I discussed Glen's case with her. I am still concerned about Glen's colon situation. His WBC is stable and he is afebrile but needs close monitoring and repeat CT imaging in 2-3 weeks for monitoring. I explained plan on Monday is to have IR place a tunneled catheter for long term antibiotics. She stated anesthesia came by yesterday as IR doing procedure with Anesthesia and she signed Anesthesia consents. I explained to mother that IR will be contacting  "her about consent for tunneled line and she states she will be happy to consent and make sure she has her phone with her at all times as she states she likely will not be able to be here Monday for actual procedure.   I explained to her that ANDRE is working on LTAC placement for her son as he needs intensive medical management of his various medical conditions including complex wound care with frequent turning, 2 IV antibiotics with monitoring as well as close monitoring of his abdominal exam. I explained to her what LTAC was and she is agreeable but states "he is not going to any NH". I explained LTAC is not a nursing home. She states she wants him home as soon as possible but understands right now due to intensity of medical care an LTAC is a better choice for him on discharge instead of going home with Home Health but that would be Plan B. She asked that Elisa Pabon ([phone]) be called to be updated as she is patient's  for the [facility] program that provides assistance and care for Glen at home.     Review of Systems   Unable to perform ROS: Patient nonverbal     Objective:     Vital Signs (Most Recent):  Temp: 99.1 °F (37.3 °C) (09/26/20 1220)  Pulse: 109 (09/26/20 1220)  Resp: 18 (09/26/20 1220)  BP: 129/70 (09/26/20 1220)  SpO2: (!) 93 % (09/26/20 1220) Vital Signs (24h Range):  Temp:  [97.2 °F (36.2 °C)-99.1 °F (37.3 °C)] 99.1 °F (37.3 °C)  Pulse:  [] 109  Resp:  [18-20] 18  SpO2:  [93 %-98 %] 93 %  BP: (111-130)/(64-70) 129/70     Weight: 33.1 kg (72 lb 15.6 oz)  Body mass index is 15.79 kg/m².  No intake or output data in the 24 hours ending 09/26/20 1251   Physical Exam  Vitals signs and nursing note reviewed.   Constitutional:       General: He is not in acute distress.     Appearance: He is well-developed. He is cachectic. He is ill-appearing (Chronic).   Eyes:      Conjunctiva/sclera: Conjunctivae normal.   Neck:      Vascular: No JVD.   Cardiovascular:      Rate and " Rhythm: Normal rate and regular rhythm.      Heart sounds: Normal heart sounds. No murmur. No friction rub. No gallop.    Pulmonary:      Effort: Pulmonary effort is normal. No respiratory distress.      Breath sounds: Normal breath sounds. No wheezing.   Abdominal:      General: Abdomen is flat. Bowel sounds are normal. There is no distension.      Palpations: Abdomen is soft.      Tenderness: There is no abdominal tenderness. There is no guarding.   Skin:     Capillary Refill: Capillary refill takes less than 2 seconds.      Findings: No erythema or rash.   Psychiatric:         Speech: He is noncommunicative.         Behavior: Behavior is cooperative.         Significant Labs:   CBC:   Recent Labs   Lab 09/25/20 0423 09/26/20  0352   WBC 10.57 10.79   HGB 10.3* 9.8*   HCT 33.5* 32.1*   * 586*     CMP:   Recent Labs   Lab 09/25/20 0423 09/26/20  0352    138   K 4.4 4.1    104   CO2 25 27   * 109   BUN 5* 6   CREATININE 0.5 0.5   CALCIUM 8.0* 8.2*   PROT 6.5 6.3   ALBUMIN 2.0* 1.9*   BILITOT 0.1 0.1   ALKPHOS 104 98   AST 26 28   ALT 22 25   ANIONGAP 7* 7*   EGFRNONAA >60.0 >60.0     Magnesium:   Recent Labs   Lab 09/25/20 0423 09/26/20  0352   MG 2.0 2.0     Phosphorus 3.0    Significant Imaging: I have reviewed all pertinent imaging results/findings within the past 24 hours.      Assessment/Plan:      * Sepsis  Pneumoperitoneum  Colitis  · Sepsis resolved with WBC normal and patient afebrile for past 48 hours. If fevers recur then likely will need to re-image abdomen. Blood cultures remain no growth.   · General surgery consulted on admit for pneumoperitoneum seen on admit CT scan of abdomen and pelvis. General surgery felt likely contained leak and very small bowel perforation related to recent colitis. Due to patient's very poor functional level at baseline and malnutrition felt to be very poor surgical candidate and recommended conservative.  · ID consulted and recommended IV  "Vancomycin, Cefepime and Flagyl to treat both small perforation and osteomyelitis of pelvic area and to extend abx until 10/22. IR to place tunneled catheter on  for long term antibiotics as current midline cannot be extended that long in future and bedside PICC team unable to place bedside PICC due to severe arm contractures. IR to coordinate with Anesthesia on  to place tunneled line. Hold tube feedings at midnight on ,  for tunneled line on .   · ID recommends repeat CT scan of abdomen to re-evaluate perforation in 2-3 weeks or sooner if any acute worsening.  · WBC improving with abx but still with fever so will need to monitor closely as with patient's poor baseline function/noncommunicative status clinical assessment of abdominal worsening more difficult to assess.   · Patient with no further diarrhea.       Decubitus ulcer of ischial area, left, stage IV  Pressure injury of right ischium, stage 4  Osteomyelitis of pelvis  · Wound care consulted and managing and wound care and preventive measures as per wound care recommendations. Wounds appear clean with no definite signs of infection.   · As per wound care "Nursing to cleanse scrotal skin breakdown and R and L ischial pressure injuries with wound cleanser. Pack ischial pressure injuries with Triad ointment and gauze, then cover bordered foam dressing to prove autolytic debridement and prevent breakdown of intact skin. Apply Triad ointment over scrotal skin breakdown."  · Patient now with exposed bone to ischial areas and ID with concern for underlying osteomyelitis even though wounds themselves do not appear infected so recommend extension of long term abx with IV Vancomycin, Cefepime and po Flagyl until at least 10/22. Will need PICC line as midline about ready to  as per ID recs and IR to place tunneled line on .     Severe protein-calorie malnutrition  · Present on admit and related to colitis, recent medical illnesses. " Patient with PEG and receiving TF with Prebio to help with nutrition. Likely cause of poor wound healing and development of ischial ulcers.   · Nutrition consulted and following and continue Peptamen with Prebio at 40 mL/hr as per nutrition recs to treat malnutrition.       Seizure disorder  Chronic and controlled and continue Phenobarbital to treat.       Anemia of chronic disease  Chronic and controlled and related to chronic infection, poor nutrition and poor baseline function status. Monitor anemia with daily CBC and transfuse if Hgb < 7.       Hypophosphatemia  · Improved level at 3.0 on 9/26.   · Phosphorus level 1.5 on 9/25 consistent with hypophosphatemia and treated with Sodium Phosphate 20 mmol IV for 1 dose(s).  · Monitor daily Phosphorus level and replace prn.     Cerebral palsy  Chronic condition. Patient with poor baseline functional status and non-communicative and bed bound/wheelchair bound with extremity contractures to all extremities at baseline.         VTE Risk Mitigation (From admission, onward)         Ordered     enoxaparin injection 30 mg  Every 24 hours      09/20/20 2201                Discharge Planning   ROCÍO: 9/29/2020     Code Status: Full Code   Is the patient medically ready for discharge?: No    Reason for patient still in hospital (select all that apply): Patient trending condition  Discharge Plan A: Long-term acute care facility (LTAC)   Discharge Delays: None known at this time              Nohemi Farris MD  Department of Hospital Medicine   [facility]

## 2020-09-27 LAB
ALBUMIN SERPL BCP-MCNC: 2.2 G/DL (ref 3.5–5.2)
ALP SERPL-CCNC: 106 U/L (ref 55–135)
ALT SERPL W/O P-5'-P-CCNC: 27 U/L (ref 10–44)
ANION GAP SERPL CALC-SCNC: 9 MMOL/L (ref 8–16)
AST SERPL-CCNC: 29 U/L (ref 10–40)
BASOPHILS # BLD AUTO: 0.02 K/UL (ref 0–0.2)
BASOPHILS NFR BLD: 0.2 % (ref 0–1.9)
BILIRUB SERPL-MCNC: 0.2 MG/DL (ref 0.1–1)
BUN SERPL-MCNC: 7 MG/DL (ref 6–20)
CALCIUM SERPL-MCNC: 8.7 MG/DL (ref 8.7–10.5)
CHLORIDE SERPL-SCNC: 101 MMOL/L (ref 95–110)
CO2 SERPL-SCNC: 26 MMOL/L (ref 23–29)
CREAT SERPL-MCNC: 0.5 MG/DL (ref 0.5–1.4)
DIFFERENTIAL METHOD: ABNORMAL
EOSINOPHIL # BLD AUTO: 0 K/UL (ref 0–0.5)
EOSINOPHIL NFR BLD: 0 % (ref 0–8)
ERYTHROCYTE [DISTWIDTH] IN BLOOD BY AUTOMATED COUNT: 15.2 % (ref 11.5–14.5)
EST. GFR  (AFRICAN AMERICAN): >60 ML/MIN/1.73 M^2
EST. GFR  (NON AFRICAN AMERICAN): >60 ML/MIN/1.73 M^2
GLUCOSE SERPL-MCNC: 90 MG/DL (ref 70–110)
HCT VFR BLD AUTO: 36.2 % (ref 40–54)
HGB BLD-MCNC: 10.9 G/DL (ref 14–18)
IMM GRANULOCYTES # BLD AUTO: 0.03 K/UL (ref 0–0.04)
IMM GRANULOCYTES NFR BLD AUTO: 0.3 % (ref 0–0.5)
LYMPHOCYTES # BLD AUTO: 2.4 K/UL (ref 1–4.8)
LYMPHOCYTES NFR BLD: 23.1 % (ref 18–48)
MAGNESIUM SERPL-MCNC: 2 MG/DL (ref 1.6–2.6)
MCH RBC QN AUTO: 29.9 PG (ref 27–31)
MCHC RBC AUTO-ENTMCNC: 30.1 G/DL (ref 32–36)
MCV RBC AUTO: 100 FL (ref 82–98)
MONOCYTES # BLD AUTO: 1 K/UL (ref 0.3–1)
MONOCYTES NFR BLD: 10 % (ref 4–15)
NEUTROPHILS # BLD AUTO: 6.9 K/UL (ref 1.8–7.7)
NEUTROPHILS NFR BLD: 66.4 % (ref 38–73)
NRBC BLD-RTO: 0 /100 WBC
PHOSPHATE SERPL-MCNC: 2.4 MG/DL (ref 2.7–4.5)
PLATELET # BLD AUTO: 655 K/UL (ref 150–350)
PMV BLD AUTO: 9.3 FL (ref 9.2–12.9)
POTASSIUM SERPL-SCNC: 4.6 MMOL/L (ref 3.5–5.1)
PROT SERPL-MCNC: 7 G/DL (ref 6–8.4)
RBC # BLD AUTO: 3.64 M/UL (ref 4.6–6.2)
SODIUM SERPL-SCNC: 136 MMOL/L (ref 136–145)
WBC # BLD AUTO: 10.36 K/UL (ref 3.9–12.7)

## 2020-09-27 PROCEDURE — 36415 COLL VENOUS BLD VENIPUNCTURE: CPT

## 2020-09-27 PROCEDURE — 63600175 PHARM REV CODE 636 W HCPCS: Performed by: HOSPITALIST

## 2020-09-27 PROCEDURE — 25000003 PHARM REV CODE 250: Performed by: INTERNAL MEDICINE

## 2020-09-27 PROCEDURE — 80053 COMPREHEN METABOLIC PANEL: CPT

## 2020-09-27 PROCEDURE — 84100 ASSAY OF PHOSPHORUS: CPT

## 2020-09-27 PROCEDURE — 99232 SBSQ HOSP IP/OBS MODERATE 35: CPT | Mod: ,,, | Performed by: INTERNAL MEDICINE

## 2020-09-27 PROCEDURE — 83735 ASSAY OF MAGNESIUM: CPT

## 2020-09-27 PROCEDURE — 25000003 PHARM REV CODE 250: Performed by: HOSPITALIST

## 2020-09-27 PROCEDURE — 20600001 HC STEP DOWN PRIVATE ROOM

## 2020-09-27 PROCEDURE — 99900035 HC TECH TIME PER 15 MIN (STAT)

## 2020-09-27 PROCEDURE — 99232 PR SUBSEQUENT HOSPITAL CARE,LEVL II: ICD-10-PCS | Mod: ,,, | Performed by: INTERNAL MEDICINE

## 2020-09-27 PROCEDURE — 85025 COMPLETE CBC W/AUTO DIFF WBC: CPT

## 2020-09-27 RX ADMIN — LOPERAMIDE HYDROCHLORIDE 2 MG: 1 SOLUTION ORAL at 01:09

## 2020-09-27 RX ADMIN — PROPRANOLOL HYDROCHLORIDE 20 MG: 20 TABLET ORAL at 03:09

## 2020-09-27 RX ADMIN — GABAPENTIN 250 MG: 250 SOLUTION ORAL at 10:09

## 2020-09-27 RX ADMIN — LOPERAMIDE HYDROCHLORIDE 2 MG: 1 SOLUTION ORAL at 05:09

## 2020-09-27 RX ADMIN — CEFEPIME 2 G: 2 INJECTION, POWDER, FOR SOLUTION INTRAVENOUS at 10:09

## 2020-09-27 RX ADMIN — LOPERAMIDE HYDROCHLORIDE 2 MG: 1 SOLUTION ORAL at 09:09

## 2020-09-27 RX ADMIN — VANCOMYCIN HYDROCHLORIDE 750 MG: 750 INJECTION, POWDER, LYOPHILIZED, FOR SOLUTION INTRAVENOUS at 10:09

## 2020-09-27 RX ADMIN — BACLOFEN 10 MG: 10 TABLET ORAL at 09:09

## 2020-09-27 RX ADMIN — PHENOBARBITAL 100 MG: 20 ELIXIR ORAL at 10:09

## 2020-09-27 RX ADMIN — Medication 10 ML: at 09:09

## 2020-09-27 RX ADMIN — LOPERAMIDE HYDROCHLORIDE 2 MG: 1 SOLUTION ORAL at 10:09

## 2020-09-27 RX ADMIN — BACLOFEN 10 MG: 10 TABLET ORAL at 10:09

## 2020-09-27 RX ADMIN — PROPRANOLOL HYDROCHLORIDE 20 MG: 20 TABLET ORAL at 09:09

## 2020-09-27 RX ADMIN — METRONIDAZOLE 500 MG: 500 TABLET ORAL at 09:09

## 2020-09-27 RX ADMIN — BACLOFEN 10 MG: 10 TABLET ORAL at 03:09

## 2020-09-27 RX ADMIN — METRONIDAZOLE 500 MG: 500 TABLET ORAL at 03:09

## 2020-09-27 RX ADMIN — VANCOMYCIN HYDROCHLORIDE 750 MG: 750 INJECTION, POWDER, LYOPHILIZED, FOR SOLUTION INTRAVENOUS at 09:09

## 2020-09-27 RX ADMIN — PROPRANOLOL HYDROCHLORIDE 20 MG: 20 TABLET ORAL at 10:09

## 2020-09-27 RX ADMIN — METRONIDAZOLE 500 MG: 500 TABLET ORAL at 10:09

## 2020-09-27 RX ADMIN — Medication 500 MG: at 09:09

## 2020-09-27 RX ADMIN — Medication 1 CAPSULE: at 09:09

## 2020-09-27 RX ADMIN — ACETAMINOPHEN 650 MG: 325 TABLET ORAL at 05:09

## 2020-09-27 NOTE — SUBJECTIVE & OBJECTIVE
Interval History: Spoke with nursing who is aware of need to hold tube feedings tonight after midnight for IR tunneled line placement tomorrow with Anesthesia. Patient with no acute events reported by nursing overnight and patient remains afebrile     Review of Systems   Unable to perform ROS: Patient nonverbal     Objective:     Vital Signs (Most Recent):  Temp: 98.8 °F (37.1 °C) (09/27/20 1108)  Pulse: 104 (09/27/20 1108)  Resp: 20 (09/27/20 1108)  BP: 121/82 (09/27/20 1108)  SpO2: 97 % (09/27/20 1108) Vital Signs (24h Range):  Temp:  [97.8 °F (36.6 °C)-99.1 °F (37.3 °C)] 98.8 °F (37.1 °C)  Pulse:  [101-126] 104  Resp:  [16-20] 20  SpO2:  [94 %-98 %] 97 %  BP: (116-145)/(64-92) 121/82     Weight: 33.1 kg (72 lb 15.6 oz)  Body mass index is 15.79 kg/m².    Intake/Output Summary (Last 24 hours) at 9/27/2020 1305  Last data filed at 9/27/2020 0800  Gross per 24 hour   Intake 320 ml   Output --   Net 320 ml      Physical Exam  Vitals signs and nursing note reviewed.   Constitutional:       General: He is not in acute distress.     Appearance: He is well-developed. He is cachectic. He is ill-appearing (Chronic).   Eyes:      Conjunctiva/sclera: Conjunctivae normal.   Neck:      Vascular: No JVD.   Cardiovascular:      Rate and Rhythm: Normal rate and regular rhythm.      Heart sounds: Normal heart sounds. No murmur. No friction rub. No gallop.    Pulmonary:      Effort: Pulmonary effort is normal. No respiratory distress.      Breath sounds: Normal breath sounds. No wheezing.   Abdominal:      General: Abdomen is flat. Bowel sounds are normal. There is no distension.      Palpations: Abdomen is soft.      Tenderness: There is no abdominal tenderness. There is no guarding.   Skin:     Capillary Refill: Capillary refill takes less than 2 seconds.      Findings: No erythema or rash.   Psychiatric:         Speech: He is noncommunicative.         Behavior: Behavior is cooperative.         Significant Labs:   CBC:   Recent  Labs   Lab 09/26/20 0352 09/27/20 0425   WBC 10.79 10.36   HGB 9.8* 10.9*   HCT 32.1* 36.2*   * 655*     CMP:   Recent Labs   Lab 09/26/20 0352 09/27/20 0425    136   K 4.1 4.6    101   CO2 27 26    90   BUN 6 7   CREATININE 0.5 0.5   CALCIUM 8.2* 8.7   PROT 6.3 7.0   ALBUMIN 1.9* 2.2*   BILITOT 0.1 0.2   ALKPHOS 98 106   AST 28 29   ALT 25 27   ANIONGAP 7* 9   EGFRNONAA >60.0 >60.0     Magnesium:   Recent Labs   Lab 09/26/20 0352 09/27/20 0425   MG 2.0 2.0     Phosphorus 2.4    Significant Imaging: I have reviewed all pertinent imaging results/findings within the past 24 hours.

## 2020-09-27 NOTE — PROGRESS NOTES
[facility] Hospital Medicine  Progress Note    Patient Name: Glen Moscoso  MRN: [id number]  Patient Class: IP- Inpatient   Admission Date: 9/20/2020  Length of Stay: 7 days  Attending Physician: Nohemi Farris MD  Primary Care Provider: Yadi Lawson MD    [place]ospital Medicine [place] [facility] [place] Nohemi Farris MD    Subjective:     Principal Problem:Sepsis        HPI:  Mr. Glen Moscoso is a 24 y.o. male with CP, complicated by seizures and a peg tube, who presents to the ER for evaluation of fever.  He just had a [place] and [place] [facility] [place] for [place] and pneumonia.  He was originally admitted to [facility], where he was found to have Pseudomonas and Serratia pneumonia, as well as a decubitus ulcer that was debrided by Gen Surg on 9/7 - cultures were not sent.  He was discharged home 9/18 on Zosyn via LUE PICC until 9/24.  Family at bedside reports he was doing well until the day of admission when he started to have fevers up to 101.2F.  They deny any new respiratory complaints.  Given his recent hospitalization and fever while on antibiotics, they brought him to the ER for further evaluation.     Upon arrival to the ER, vitals were temp 101.2F, , RR 24 and /75.  Labs showed WBC 17 with ESR 90 and Procal 0.56.  CXR didn't show a new consolidation.  His case was discussed with ID, who suggested Vanc, Cefepime, and Flagyl based on his previous cultures, as well as CT chest/abdomen/pelvis to include sacral wounds.  He was admitted to Hospital Medicine for further management.    Overview/Hospital Course:  CT scan of abdomen and pelvis done on admit showed overnight small amount of free air and general surgery consulted and evaluated patient who recommended conservative management with antibiotics and close monitoring as felt high surgical risk and likely contained small colonic perforation. ID consulted and patient placed on IV Cefepime/Flagyl and  Vancomycin. G tube study done and no air leak noted. Patient on long term abx as outpatient for infected bilateral Stage IV ischial ulcers and recent bout of Serratia and Pseudomonal pneumonia. Antibiotics and wound care for ulcers continued on this admit. Patient with severe cerebral palsy and bed bound/wheelchair bound at baseline. Blood cultures from admit are no growth to date. Patient with no further fever since admit on 9/24 and WBC improving. Plan to continue broad spectrum abx and conservative care for small colonic perforation. Patient back on home TF and tolerating. ID recommending to extend current antibiotic regimen to treat bowel perforation and pelvic osteomyelitis until 10/22. Patient only with midline and needs PICC line for longer term antibiotic treatment. PICC line team consulted but stated due to patient's severe arm contractures PICC cannot be placed. PICC team recommended to consult IR for tunneled catheter amd IIR consulted on 9/25 and plan to place line on 9/28 with assistance of anesthesia. IR states to hold tube feedings at midnight on Sunday for line placement on 9/28.       Interval History: Spoke with nursing who is aware of need to hold tube feedings tonight after midnight for IR tunneled line placement tomorrow with Anesthesia. Patient with no acute events reported by nursing overnight and patient remains afebrile     Review of Systems   Unable to perform ROS: Patient nonverbal     Objective:     Vital Signs (Most Recent):  Temp: 98.8 °F (37.1 °C) (09/27/20 1108)  Pulse: 104 (09/27/20 1108)  Resp: 20 (09/27/20 1108)  BP: 121/82 (09/27/20 1108)  SpO2: 97 % (09/27/20 1108) Vital Signs (24h Range):  Temp:  [97.8 °F (36.6 °C)-99.1 °F (37.3 °C)] 98.8 °F (37.1 °C)  Pulse:  [101-126] 104  Resp:  [16-20] 20  SpO2:  [94 %-98 %] 97 %  BP: (116-145)/(64-92) 121/82     Weight: 33.1 kg (72 lb 15.6 oz)  Body mass index is 15.79 kg/m².    Intake/Output Summary (Last 24 hours) at 9/27/2020 1305  Last  data filed at 9/27/2020 0800  Gross per 24 hour   Intake 320 ml   Output --   Net 320 ml      Physical Exam  Vitals signs and nursing note reviewed.   Constitutional:       General: He is not in acute distress.     Appearance: He is well-developed. He is cachectic. He is ill-appearing (Chronic).   Eyes:      Conjunctiva/sclera: Conjunctivae normal.   Neck:      Vascular: No JVD.   Cardiovascular:      Rate and Rhythm: Normal rate and regular rhythm.      Heart sounds: Normal heart sounds. No murmur. No friction rub. No gallop.    Pulmonary:      Effort: Pulmonary effort is normal. No respiratory distress.      Breath sounds: Normal breath sounds. No wheezing.   Abdominal:      General: Abdomen is flat. Bowel sounds are normal. There is no distension.      Palpations: Abdomen is soft.      Tenderness: There is no abdominal tenderness. There is no guarding.   Skin:     Capillary Refill: Capillary refill takes less than 2 seconds.      Findings: No erythema or rash.   Psychiatric:         Speech: He is noncommunicative.         Behavior: Behavior is cooperative.         Significant Labs:   CBC:   Recent Labs   Lab 09/26/20  0352 09/27/20 0425   WBC 10.79 10.36   HGB 9.8* 10.9*   HCT 32.1* 36.2*   * 655*     CMP:   Recent Labs   Lab 09/26/20  0352 09/27/20  0425    136   K 4.1 4.6    101   CO2 27 26    90   BUN 6 7   CREATININE 0.5 0.5   CALCIUM 8.2* 8.7   PROT 6.3 7.0   ALBUMIN 1.9* 2.2*   BILITOT 0.1 0.2   ALKPHOS 98 106   AST 28 29   ALT 25 27   ANIONGAP 7* 9   EGFRNONAA >60.0 >60.0     Magnesium:   Recent Labs   Lab 09/26/20  0352 09/27/20  0425   MG 2.0 2.0     Phosphorus 2.4    Significant Imaging: I have reviewed all pertinent imaging results/findings within the past 24 hours.      Assessment/Plan:      * Sepsis  Pneumoperitoneum  Colitis  · Sepsis resolved with WBC normal and patient afebrile for past 48 hours. If fevers recur then likely will need to re-image abdomen. Blood cultures  "remain no growth.   · General surgery consulted on admit for pneumoperitoneum seen on admit CT scan of abdomen and pelvis. General surgery felt likely contained leak and very small bowel perforation related to recent colitis. Due to patient's very poor functional level at baseline and malnutrition felt to be very poor surgical candidate and recommended conservative.  · ID consulted and recommended IV Vancomycin, Cefepime and Flagyl to treat both small perforation and osteomyelitis of pelvic area and to extend abx until 10/22. IR to place tunneled catheter on 9/28 for long term antibiotics as current midline cannot be extended that long in future and bedside PICC team unable to place bedside PICC due to severe arm contractures. IR to coordinate with Anesthesia on 9/28 to place tunneled line. Hold tube feedings at midnight on Sunday, 9/27 for tunneled line on 9/28.   · ID recommends repeat CT scan of abdomen to re-evaluate perforation in 2-3 weeks or sooner if any acute worsening.  · WBC improving with abx but still with fever so will need to monitor closely as with patient's poor baseline function/noncommunicative status clinical assessment of abdominal worsening more difficult to assess.   · Patient with no further diarrhea.       Decubitus ulcer of ischial area, left, stage IV  Pressure injury of right ischium, stage 4  Osteomyelitis of pelvis  · Wound care consulted and managing and wound care and preventive measures as per wound care recommendations. Wounds appear clean with no definite signs of infection.   · As per wound care "Nursing to cleanse scrotal skin breakdown and R and L ischial pressure injuries with wound cleanser. Pack ischial pressure injuries with Triad ointment and gauze, then cover bordered foam dressing to prove autolytic debridement and prevent breakdown of intact skin. Apply Triad ointment over scrotal skin breakdown."  · Patient now with exposed bone to ischial areas and ID with concern for " underlying osteomyelitis even though wounds themselves do not appear infected so recommend extension of long term abx with IV Vancomycin, Cefepime and po Flagyl until at least 10/22. Will need PICC line as midline about ready to  as per ID recs and IR to place tunneled line on .     Severe protein-calorie malnutrition  · Present on admit and related to colitis, recent medical illnesses. Patient with PEG and receiving TF with Prebio to help with nutrition. Likely cause of poor wound healing and development of ischial ulcers.   · Nutrition consulted and following and continue Peptamen with Prebio at 40 mL/hr as per nutrition recs to treat malnutrition.       Seizure disorder  Chronic and controlled and continue Phenobarbital to treat.       Anemia of chronic disease  Chronic and controlled and related to chronic infection, poor nutrition and poor baseline function status. Monitor anemia with daily CBC and transfuse if Hgb < 7.       Hypophosphatemia  · Improved level at 3.0 on  and stable at 2.4 on .   · Phosphorus level 1.5 on  consistent with hypophosphatemia and treated with Sodium Phosphate 20 mmol IV for 1 dose(s).  · Monitor daily Phosphorus level and replace prn.     Cerebral palsy  Chronic condition. Patient with poor baseline functional status and non-communicative and bed bound/wheelchair bound with extremity contractures to all extremities at baseline.         VTE Risk Mitigation (From admission, onward)    None          Discharge Planning   ROCÍO: 2020     Code Status: Full Code   Is the patient medically ready for discharge?: No    Reason for patient still in hospital (select all that apply): Patient trending condition and Other (specify) Needs tunneled line access to be placed by IR on  and then will be ready to discharge to an LTAC but do not have an accepting LTAC facility.  Discharge Plan A: Long-term acute care facility (LTAC)   Discharge Delays: None known at this  time        Nohemi Farris MD  Department of Hospital Medicine   [facility]

## 2020-09-27 NOTE — ASSESSMENT & PLAN NOTE
· Improved level at 3.0 on 9/26 and stable at 2.4 on 9/27.   · Phosphorus level 1.5 on 9/25 consistent with hypophosphatemia and treated with Sodium Phosphate 20 mmol IV for 1 dose(s).  · Monitor daily Phosphorus level and replace prn.

## 2020-09-27 NOTE — PLAN OF CARE
Problem: Adult Inpatient Plan of Care  Goal: Plan of Care Review  Outcome: Ongoing, Progressing  Goal: Patient-Specific Goal (Individualization)  Outcome: Ongoing, Progressing  Flowsheets (Taken 9/27/2020 1813)  Individualized Care Needs: NICOLA  Anxieties, Fears or Concerns: NICOLA  Patient-Specific Goals (Include Timeframe): NICOLA  Goal: Optimal Comfort and Wellbeing  Outcome: Ongoing, Progressing  Intervention: Provide Person-Centered Care  Flowsheets (Taken 9/27/2020 1813)  Trust Relationship/Rapport:   care explained   choices provided   emotional support provided   empathic listening provided   questions answered   questions encouraged   thoughts/feelings acknowledged     Problem: Nutrition Impaired (Sepsis/Septic Shock)  Goal: Optimal Nutrition Intake  Outcome: Ongoing, Progressing  Intervention: Promote and Optimize Nutrition Delivery  Flowsheets (Taken 9/27/2020 1813)  Nutrition Support Management: (tube feedings continued) other (see comments)     Problem: Wound  Goal: Optimal Wound Healing  Outcome: Ongoing, Progressing  Intervention: Promote Effective Wound Healing  Flowsheets (Taken 9/27/2020 1813)  Oral Nutrition Promotion:   calorie dense foods provided   safe use of adaptive equipment encouraged   rest periods promoted  Pain Management Interventions:   position adjusted   pillow support provided   relaxation techniques promoted     Pt nonverbal, awake and alert, VSS. No acute changes today. Plan to go for tunneled catheter placement tomorrow for long term IV antibiotics. Tube feeds will be held at midnight. Will continue to monitor.

## 2020-09-27 NOTE — PLAN OF CARE
Problem: Adult Inpatient Plan of Care  Goal: Plan of Care Review  Outcome: Ongoing, Progressing  Goal: Optimal Comfort and Wellbeing  Outcome: Ongoing, Progressing     Problem: Adjustment to Illness (Sepsis/Septic Shock)  Goal: Optimal Coping  Outcome: Ongoing, Progressing  Patient is alert, responds to verbal stimuli, non-verbal. Wound care done as ordered. G-tube dressing changed. Spoke with mom on phone, update given, plan of care reviewed. Will continue to monitor.

## 2020-09-27 NOTE — NURSING
Wound care completed. Dressings changed bilaterally to buttocks, packed with triad gauze. Will continue to monitor.

## 2020-09-28 ENCOUNTER — ANESTHESIA (OUTPATIENT)
Dept: ENDOSCOPY | Facility: HOSPITAL | Age: 25
DRG: 871 | End: 2020-09-28
Payer: MEDICAID

## 2020-09-28 LAB
ALBUMIN SERPL BCP-MCNC: 2 G/DL (ref 3.5–5.2)
ALP SERPL-CCNC: 96 U/L (ref 55–135)
ALT SERPL W/O P-5'-P-CCNC: 20 U/L (ref 10–44)
ANION GAP SERPL CALC-SCNC: 10 MMOL/L (ref 8–16)
AST SERPL-CCNC: 22 U/L (ref 10–40)
BASOPHILS # BLD AUTO: 0.02 K/UL (ref 0–0.2)
BASOPHILS NFR BLD: 0.2 % (ref 0–1.9)
BILIRUB SERPL-MCNC: 0.1 MG/DL (ref 0.1–1)
BUN SERPL-MCNC: 7 MG/DL (ref 6–20)
CALCIUM SERPL-MCNC: 8.6 MG/DL (ref 8.7–10.5)
CHLORIDE SERPL-SCNC: 102 MMOL/L (ref 95–110)
CO2 SERPL-SCNC: 25 MMOL/L (ref 23–29)
CREAT SERPL-MCNC: 0.4 MG/DL (ref 0.5–1.4)
DIFFERENTIAL METHOD: ABNORMAL
EOSINOPHIL # BLD AUTO: 0 K/UL (ref 0–0.5)
EOSINOPHIL NFR BLD: 0 % (ref 0–8)
ERYTHROCYTE [DISTWIDTH] IN BLOOD BY AUTOMATED COUNT: 14.9 % (ref 11.5–14.5)
EST. GFR  (AFRICAN AMERICAN): >60 ML/MIN/1.73 M^2
EST. GFR  (NON AFRICAN AMERICAN): >60 ML/MIN/1.73 M^2
GLUCOSE SERPL-MCNC: 106 MG/DL (ref 70–110)
HCT VFR BLD AUTO: 30.4 % (ref 40–54)
HGB BLD-MCNC: 9.3 G/DL (ref 14–18)
IMM GRANULOCYTES # BLD AUTO: 0.03 K/UL (ref 0–0.04)
IMM GRANULOCYTES NFR BLD AUTO: 0.3 % (ref 0–0.5)
LYMPHOCYTES # BLD AUTO: 1.7 K/UL (ref 1–4.8)
LYMPHOCYTES NFR BLD: 16.6 % (ref 18–48)
MAGNESIUM SERPL-MCNC: 1.9 MG/DL (ref 1.6–2.6)
MCH RBC QN AUTO: 29.7 PG (ref 27–31)
MCHC RBC AUTO-ENTMCNC: 30.6 G/DL (ref 32–36)
MCV RBC AUTO: 97 FL (ref 82–98)
MONOCYTES # BLD AUTO: 1.1 K/UL (ref 0.3–1)
MONOCYTES NFR BLD: 11 % (ref 4–15)
NEUTROPHILS # BLD AUTO: 7.4 K/UL (ref 1.8–7.7)
NEUTROPHILS NFR BLD: 71.9 % (ref 38–73)
NRBC BLD-RTO: 0 /100 WBC
PHOSPHATE SERPL-MCNC: 2.8 MG/DL (ref 2.7–4.5)
PLATELET # BLD AUTO: 631 K/UL (ref 150–350)
PMV BLD AUTO: 9.3 FL (ref 9.2–12.9)
POTASSIUM SERPL-SCNC: 3.9 MMOL/L (ref 3.5–5.1)
PROT SERPL-MCNC: 6.7 G/DL (ref 6–8.4)
RBC # BLD AUTO: 3.13 M/UL (ref 4.6–6.2)
SARS-COV-2 RDRP RESP QL NAA+PROBE: NEGATIVE
SODIUM SERPL-SCNC: 137 MMOL/L (ref 136–145)
VANCOMYCIN TROUGH SERPL-MCNC: 13.8 UG/ML (ref 10–22)
WBC # BLD AUTO: 10.26 K/UL (ref 3.9–12.7)

## 2020-09-28 PROCEDURE — 71000045 HC DOSC ROUTINE RECOVERY EA ADD'L HR

## 2020-09-28 PROCEDURE — 84100 ASSAY OF PHOSPHORUS: CPT

## 2020-09-28 PROCEDURE — 80053 COMPREHEN METABOLIC PANEL: CPT

## 2020-09-28 PROCEDURE — U0002 COVID-19 LAB TEST NON-CDC: HCPCS

## 2020-09-28 PROCEDURE — 94761 N-INVAS EAR/PLS OXIMETRY MLT: CPT

## 2020-09-28 PROCEDURE — 99232 SBSQ HOSP IP/OBS MODERATE 35: CPT | Mod: ,,, | Performed by: INTERNAL MEDICINE

## 2020-09-28 PROCEDURE — D9220A PRA ANESTHESIA: Mod: ANES,,, | Performed by: ANESTHESIOLOGY

## 2020-09-28 PROCEDURE — 99232 PR SUBSEQUENT HOSPITAL CARE,LEVL II: ICD-10-PCS | Mod: ,,, | Performed by: INTERNAL MEDICINE

## 2020-09-28 PROCEDURE — 31720 CLEARANCE OF AIRWAYS: CPT

## 2020-09-28 PROCEDURE — D9220A PRA ANESTHESIA: ICD-10-PCS | Mod: CRNA,,, | Performed by: STUDENT IN AN ORGANIZED HEALTH CARE EDUCATION/TRAINING PROGRAM

## 2020-09-28 PROCEDURE — 83735 ASSAY OF MAGNESIUM: CPT

## 2020-09-28 PROCEDURE — 63600175 PHARM REV CODE 636 W HCPCS: Performed by: ANESTHESIOLOGY

## 2020-09-28 PROCEDURE — 37000009 HC ANESTHESIA EA ADD 15 MINS

## 2020-09-28 PROCEDURE — 94668 MNPJ CHEST WALL SBSQ: CPT

## 2020-09-28 PROCEDURE — 63600175 PHARM REV CODE 636 W HCPCS: Performed by: HOSPITALIST

## 2020-09-28 PROCEDURE — 25000003 PHARM REV CODE 250: Performed by: HOSPITALIST

## 2020-09-28 PROCEDURE — 37000008 HC ANESTHESIA 1ST 15 MINUTES

## 2020-09-28 PROCEDURE — 85025 COMPLETE CBC W/AUTO DIFF WBC: CPT

## 2020-09-28 PROCEDURE — 20600001 HC STEP DOWN PRIVATE ROOM

## 2020-09-28 PROCEDURE — 63600175 PHARM REV CODE 636 W HCPCS: Performed by: STUDENT IN AN ORGANIZED HEALTH CARE EDUCATION/TRAINING PROGRAM

## 2020-09-28 PROCEDURE — 99900035 HC TECH TIME PER 15 MIN (STAT)

## 2020-09-28 PROCEDURE — D9220A PRA ANESTHESIA: ICD-10-PCS | Mod: ANES,,, | Performed by: ANESTHESIOLOGY

## 2020-09-28 PROCEDURE — 71000044 HC DOSC ROUTINE RECOVERY FIRST HOUR

## 2020-09-28 PROCEDURE — 36415 COLL VENOUS BLD VENIPUNCTURE: CPT

## 2020-09-28 PROCEDURE — 80202 ASSAY OF VANCOMYCIN: CPT

## 2020-09-28 PROCEDURE — 25000003 PHARM REV CODE 250: Performed by: INTERNAL MEDICINE

## 2020-09-28 PROCEDURE — 25000003 PHARM REV CODE 250: Performed by: STUDENT IN AN ORGANIZED HEALTH CARE EDUCATION/TRAINING PROGRAM

## 2020-09-28 PROCEDURE — D9220A PRA ANESTHESIA: Mod: CRNA,,, | Performed by: STUDENT IN AN ORGANIZED HEALTH CARE EDUCATION/TRAINING PROGRAM

## 2020-09-28 RX ORDER — HYDROMORPHONE HYDROCHLORIDE 1 MG/ML
0.2 INJECTION, SOLUTION INTRAMUSCULAR; INTRAVENOUS; SUBCUTANEOUS EVERY 5 MIN PRN
Status: DISCONTINUED | OUTPATIENT
Start: 2020-09-28 | End: 2020-09-28 | Stop reason: HOSPADM

## 2020-09-28 RX ORDER — ONDANSETRON 2 MG/ML
4 INJECTION INTRAMUSCULAR; INTRAVENOUS ONCE AS NEEDED
Status: DISCONTINUED | OUTPATIENT
Start: 2020-09-28 | End: 2020-09-28 | Stop reason: HOSPADM

## 2020-09-28 RX ORDER — ONDANSETRON 2 MG/ML
INJECTION INTRAMUSCULAR; INTRAVENOUS
Status: DISCONTINUED | OUTPATIENT
Start: 2020-09-28 | End: 2020-09-28

## 2020-09-28 RX ORDER — SODIUM CHLORIDE 9 MG/ML
INJECTION, SOLUTION INTRAVENOUS CONTINUOUS PRN
Status: DISCONTINUED | OUTPATIENT
Start: 2020-09-28 | End: 2020-09-28

## 2020-09-28 RX ORDER — PROPOFOL 10 MG/ML
VIAL (ML) INTRAVENOUS
Status: DISCONTINUED | OUTPATIENT
Start: 2020-09-28 | End: 2020-09-28

## 2020-09-28 RX ORDER — PROPOFOL 10 MG/ML
VIAL (ML) INTRAVENOUS CONTINUOUS PRN
Status: DISCONTINUED | OUTPATIENT
Start: 2020-09-28 | End: 2020-09-28

## 2020-09-28 RX ORDER — FENTANYL CITRATE 50 UG/ML
INJECTION, SOLUTION INTRAMUSCULAR; INTRAVENOUS
Status: DISCONTINUED | OUTPATIENT
Start: 2020-09-28 | End: 2020-09-28

## 2020-09-28 RX ORDER — SODIUM CHLORIDE 0.9 % (FLUSH) 0.9 %
10 SYRINGE (ML) INJECTION
Status: DISCONTINUED | OUTPATIENT
Start: 2020-09-28 | End: 2020-10-09 | Stop reason: HOSPADM

## 2020-09-28 RX ORDER — LIDOCAINE HYDROCHLORIDE 20 MG/ML
INJECTION INTRAVENOUS
Status: DISCONTINUED | OUTPATIENT
Start: 2020-09-28 | End: 2020-09-28

## 2020-09-28 RX ORDER — FENTANYL CITRATE 50 UG/ML
25 INJECTION, SOLUTION INTRAMUSCULAR; INTRAVENOUS EVERY 5 MIN PRN
Status: COMPLETED | OUTPATIENT
Start: 2020-09-28 | End: 2020-09-28

## 2020-09-28 RX ADMIN — VANCOMYCIN HYDROCHLORIDE 750 MG: 750 INJECTION, POWDER, LYOPHILIZED, FOR SOLUTION INTRAVENOUS at 10:09

## 2020-09-28 RX ADMIN — FENTANYL CITRATE 25 MCG: 50 INJECTION INTRAMUSCULAR; INTRAVENOUS at 02:09

## 2020-09-28 RX ADMIN — PHENOBARBITAL 100 MG: 20 ELIXIR ORAL at 11:09

## 2020-09-28 RX ADMIN — GABAPENTIN 250 MG: 250 SOLUTION ORAL at 10:09

## 2020-09-28 RX ADMIN — BACLOFEN 10 MG: 10 TABLET ORAL at 10:09

## 2020-09-28 RX ADMIN — PROPOFOL 30 MG: 10 INJECTION, EMULSION INTRAVENOUS at 01:09

## 2020-09-28 RX ADMIN — CEFEPIME 2 G: 2 INJECTION, POWDER, FOR SOLUTION INTRAVENOUS at 10:09

## 2020-09-28 RX ADMIN — METRONIDAZOLE 500 MG: 500 TABLET ORAL at 10:09

## 2020-09-28 RX ADMIN — FENTANYL CITRATE 100 MCG: 50 INJECTION INTRAMUSCULAR; INTRAVENOUS at 02:09

## 2020-09-28 RX ADMIN — PROPRANOLOL HYDROCHLORIDE 20 MG: 20 TABLET ORAL at 10:09

## 2020-09-28 RX ADMIN — BACLOFEN 10 MG: 10 TABLET ORAL at 04:09

## 2020-09-28 RX ADMIN — LIDOCAINE HYDROCHLORIDE 30 MG: 20 INJECTION, SOLUTION INTRAVENOUS at 01:09

## 2020-09-28 RX ADMIN — METRONIDAZOLE 500 MG: 500 TABLET ORAL at 04:09

## 2020-09-28 RX ADMIN — LOPERAMIDE HYDROCHLORIDE 2 MG: 1 SOLUTION ORAL at 06:09

## 2020-09-28 RX ADMIN — ONDANSETRON 4 MG: 2 INJECTION, SOLUTION INTRAMUSCULAR; INTRAVENOUS at 01:09

## 2020-09-28 RX ADMIN — PROPOFOL 75 MCG/KG/MIN: 10 INJECTION, EMULSION INTRAVENOUS at 01:09

## 2020-09-28 RX ADMIN — LOPERAMIDE HYDROCHLORIDE 2 MG: 1 SOLUTION ORAL at 10:09

## 2020-09-28 RX ADMIN — SODIUM CHLORIDE: 0.9 INJECTION, SOLUTION INTRAVENOUS at 01:09

## 2020-09-28 RX ADMIN — FENTANYL CITRATE 25 MCG: 50 INJECTION, SOLUTION INTRAMUSCULAR; INTRAVENOUS at 01:09

## 2020-09-28 RX ADMIN — PROPRANOLOL HYDROCHLORIDE 20 MG: 20 TABLET ORAL at 04:09

## 2020-09-28 NOTE — TRANSFER OF CARE
"Anesthesia Transfer of Care Note    Patient: Glen Moscoso    Procedure(s) Performed: Procedure(s) (LRB):  Insertion,catheter,tunneled (Right)    Patient location: [facility]    Anesthesia Type: general    Transport from OR: Transported from OR on 6-10 L/min O2 by face mask with adequate spontaneous ventilation    Post pain: adequate analgesia    Post assessment: no apparent anesthetic complications and tolerated procedure well    Post vital signs: stable    Level of consciousness: awake    Nausea/Vomiting: no nausea/vomiting    Complications: none    Transfer of care protocol was followed      Last vitals:   Visit Vitals  /81 (BP Location: Right arm, Patient Position: Lying)   Pulse 103   Temp 36.6 °C (97.9 °F) (Temporal)   Resp 20   Ht 4' 9" (1.448 m)   Wt 33.1 kg (72 lb 15.6 oz)   SpO2 100%   BMI 15.79 kg/m²     "

## 2020-09-28 NOTE — PROGRESS NOTES
[facility] Hospital Medicine  Progress Note    Patient Name: Glen Moscoso  MRN: [id number]  Patient Class: IP- Inpatient   Admission Date: 9/20/2020  Length of Stay: 8 days  Attending Physician: Nohemi Farris MD  Primary Care Provider: Yadi Lawson MD    Hospital Medicine Team: [facility] [place] Nohemi Farris MD    Subjective:     Principal Problem:Sepsis        HPI:  Mr. Glen Moscoso is a 24 y.o. male with CP, complicated by seizures and a peg tube, who presents to the ER for evaluation of fever.  He just had a prolonged and complex hospital stay [place] [facility] [place]18 for seizures and pneumonia.  He was originally admitted to [facility], where he was found to have Pseudomonas and Serratia pneumonia, as well as a decubitus ulcer that was debrided by Gen Surg on 9/7 - cultures were not sent.  He was discharged home 9/18 on Zosyn via LUE PICC until 9/24.  Family at bedside reports he was doing well until the day of admission when he started to have fevers up to 101.2F.  They deny any new respiratory complaints.  Given his recent hospitalization and fever while on antibiotics, they brought him to the ER for further evaluation.     Upon arrival to the ER, vitals were temp 101.2F, , RR 24 and /75.  Labs showed WBC 17 with ESR 90 and Procal 0.56.  CXR didn't show a new consolidation.  His case was discussed with ID, who suggested Vanc, Cefepime, and Flagyl based on his previous cultures, as well as CT chest/abdomen/pelvis to include sacral wounds.  He was admitted to Hospital Medicine for further management.    Overview/Hospital Course:  CT scan of abdomen and pelvis done on admit showed overnight small amount of free air and general surgery consulted and evaluated patient who recommended conservative management with antibiotics and close monitoring as felt high surgical risk and likely contained small colonic perforation. ID consulted and patient placed on IV Cefepime/Flagyl and  Vancomycin. G tube study done and no air leak noted. Patient on long term abx as outpatient for infected bilateral Stage IV ischial ulcers and recent bout of Serratia and Pseudomonal pneumonia. Antibiotics and wound care for ulcers continued on this admit. Patient with severe cerebral palsy and bed bound/wheelchair bound at baseline. Blood cultures from admit are no growth to date. Patient with no further fever since admit on 9/24 and WBC improving. Plan to continue broad spectrum abx and conservative care for small colonic perforation. Patient back on home TF and tolerating. ID recommending to extend current antibiotic regimen to treat bowel perforation and pelvic osteomyelitis until 10/22. Patient only with midline and needs PICC line for longer term antibiotic treatment. PICC line team consulted but stated due to patient's severe arm contractures PICC cannot be placed. PICC team recommended to consult IR for tunneled catheter amd IIR consulted on 9/25 and plan to place line on 9/28 with assistance of anesthesia. IR states to hold tube feedings at midnight on Sunday for line placement on 9/28. Patient had tunneled line placed by IR to right chest wall on 9/28.       Interval History: Patient just returned from IR procedure for tunneled IV line. Upon return to room but with significant upper airway and chest congestion and  and /110 with oxygen sats in low 80's. Respiratory called stat and patient placed on 10 liters of oxygen. Patient had aggressive NT suction done by respiratory. I came to bedside and bedside nurs and respiratory there. respiratory suctioned patient and significant amount of clear secretions suctioned. Nurse gave 1500 meds that patient missed including Propranolol and his Baclofen. After suctioning oxygen rodo came up to 100% on 10 liters via face mask so oxygen decreased to 5 liters via face mask. Patient appeared much more comfortable and relaxed after suctioning. Patient with  improved HR to 125-130 range and /110. HR on monitor at bedside appeared to be sinus tachycardia. Patient doing well after my assessment and will place patient on continuous pulse ox for monitoring and monitor HR and BP for now as patient did miss his am meds which may have contributed to increased HR and BP.     Review of Systems   Unable to perform ROS: Patient nonverbal     Objective:     Vital Signs (Most Recent):  Temp: 98.8 °F (37.1 °C) (09/28/20 1515)  Pulse: (!) 119 (09/28/20 1515)  Resp: 20 (09/28/20 1515)  BP: (!) 136/92 (09/28/20 1515)  SpO2: 100 % (09/28/20 1637) Vital Signs (24h Range):  Temp:  [97.7 °F (36.5 °C)-99.4 °F (37.4 °C)] 98.8 °F (37.1 °C)  Pulse:  [] 119  Resp:  [16-24] 20  SpO2:  [88 %-100 %] 100 %  BP: (119-160)/() 136/92     Weight: 33.1 kg (72 lb 15.6 oz)  Body mass index is 15.79 kg/m².    Intake/Output Summary (Last 24 hours) at 9/28/2020 1651  Last data filed at 9/28/2020 1334  Gross per 24 hour   Intake 200 ml   Output 1350 ml   Net -1150 ml      Physical Exam  Vitals signs and nursing note reviewed.   Constitutional:       General: He is not in acute distress.     Appearance: He is well-developed. He is cachectic. He is ill-appearing (Chronic).   Eyes:      Conjunctiva/sclera: Conjunctivae normal.   Neck:      Vascular: No JVD.   Cardiovascular:      Rate and Rhythm: Normal rate and regular rhythm.      Heart sounds: Normal heart sounds. No murmur. No friction rub. No gallop.    Pulmonary:      Effort: Pulmonary effort is normal. No respiratory distress.      Breath sounds: Rhonchi (Mainly upper airway sounds) present. No wheezing.   Abdominal:      General: Abdomen is flat. Bowel sounds are normal. There is no distension.      Palpations: Abdomen is soft.      Tenderness: There is no abdominal tenderness. There is no guarding.   Skin:     Capillary Refill: Capillary refill takes less than 2 seconds.      Findings: No erythema or rash.   Psychiatric:         Speech:  He is noncommunicative.         Behavior: Behavior is cooperative.         Significant Labs:   CBC:   Recent Labs   Lab 09/27/20  0425 09/28/20  0235   WBC 10.36 10.26   HGB 10.9* 9.3*   HCT 36.2* 30.4*   * 631*     CMP:   Recent Labs   Lab 09/27/20  0425 09/28/20  0235    137   K 4.6 3.9    102   CO2 26 25   GLU 90 106   BUN 7 7   CREATININE 0.5 0.4*   CALCIUM 8.7 8.6*   PROT 7.0 6.7   ALBUMIN 2.2* 2.0*   BILITOT 0.2 0.1   ALKPHOS 106 96   AST 29 22   ALT 27 20   ANIONGAP 9 10   EGFRNONAA >60.0 >60.0     Magnesium:   Recent Labs   Lab 09/27/20 0425 09/28/20  0235   MG 2.0 1.9       Significant Imaging: I have reviewed all pertinent imaging results/findings within the past 24 hours.      Assessment/Plan:      * Sepsis  Pneumoperitoneum  Colitis  · Sepsis resolved with WBC normal and patient afebrile for past 4 days. If fevers recur then likely will need to re-image abdomen. Blood cultures remain no growth.   · General surgery consulted on admit for pneumoperitoneum seen on admit CT scan of abdomen and pelvis. General surgery felt likely contained leak and very small bowel perforation related to recent colitis. Due to patient's very poor functional level at baseline and malnutrition felt to be very poor surgical candidate and recommended conservative.  · ID consulted and recommended IV Vancomycin, Cefepime and Flagyl to treat both small perforation and osteomyelitis of pelvic area and to extend abx until 10/22. IR to place tunneled catheter on 9/28 for long term antibiotics as current midline cannot be extended that long in future and bedside PICC team unable to place bedside PICC due to severe arm contractures. IR to coordinate with Anesthesia on 9/28 to place tunneled line. Hold tube feedings at midnight on Sunday, 9/27 for tunneled line on 9/28.   · ID recommends repeat CT scan of abdomen to re-evaluate perforation in 2-3 weeks or sooner if any acute worsening.  · WBC improving with abx but  "still with fever so will need to monitor closely as with patient's poor baseline function/noncommunicative status clinical assessment of abdominal worsening more difficult to assess.   · Patient with no further diarrhea.       Decubitus ulcer of ischial area, left, stage IV  Pressure injury of right ischium, stage 4  Osteomyelitis of pelvis  · Wound care consulted and managing and wound care and preventive measures as per wound care recommendations. Wounds appear clean with no definite signs of infection.   · As per wound care "Nursing to cleanse scrotal skin breakdown and R and L ischial pressure injuries with wound cleanser. Pack ischial pressure injuries with Triad ointment and gauze, then cover bordered foam dressing to prove autolytic debridement and prevent breakdown of intact skin. Apply Triad ointment over scrotal skin breakdown."  · Patient now with exposed bone to ischial areas and ID with concern for underlying osteomyelitis even though wounds themselves do not appear infected so recommend extension of long term abx with IV Vancomycin, Cefepime and po Flagyl until at least 10/22. Will need PICC line as midline about ready to  as per ID recs and IR to place tunneled line on .     Severe protein-calorie malnutrition  · Present on admit and related to colitis, recent medical illnesses. Patient with PEG and receiving TF with Prebio to help with nutrition. Likely cause of poor wound healing and development of ischial ulcers.   · Nutrition consulted and following and continue Peptamen with Prebio at 40 mL/hr as per nutrition recs to treat malnutrition.       Seizure disorder  Chronic and controlled and continue Phenobarbital to treat.       Anemia of chronic disease  Chronic and controlled and related to chronic infection, poor nutrition and poor baseline function status. Monitor anemia with daily CBC and transfuse if Hgb < 7.       Hypophosphatemia  · Improved level at 3.0 on  and stable at 2.4 " on 9/27 and 2.8 on 9/28.   · Phosphorus level 1.5 on 9/25 consistent with hypophosphatemia and treated with Sodium Phosphate 20 mmol IV for 1 dose(s).  · Monitor daily Phosphorus level and replace prn.     Cerebral palsy  Chronic condition. Patient with poor baseline functional status and non-communicative and bed bound/wheelchair bound with extremity contractures to all extremities at baseline.         VTE Risk Mitigation (From admission, onward)         Ordered     IP VTE HIGH RISK PATIENT  Once      09/28/20 1211     Place sequential compression device  Until discontinued      09/28/20 1211                Discharge Planning   ROCÍO: 9/30/2020     Code Status: Full Code   Is the patient medically ready for discharge?: No    Reason for patient still in hospital (select all that apply): Patient trending condition  Discharge Plan A: Long-term acute care facility (LTAC)   Discharge Delays: None known at this time              Nohemi Farris MD  Department of Hospital Medicine   [facility]

## 2020-09-28 NOTE — ASSESSMENT & PLAN NOTE
· Improved level at 3.0 on 9/26 and stable at 2.4 on 9/27 and 2.8 on 9/28.   · Phosphorus level 1.5 on 9/25 consistent with hypophosphatemia and treated with Sodium Phosphate 20 mmol IV for 1 dose(s).  · Monitor daily Phosphorus level and replace prn.

## 2020-09-28 NOTE — PROCEDURES
"  Pre Op Diagnosis: need for IV accessed  Post Op Diagnosis: Same    Procedure: Tunneled central venous catheter placement    Procedure performed by: Lynsey    Written Informed Consent Obtained: Yes  Specimen Removed: NO  Estimated Blood Loss: Minimal    Findings:   Successful placement of right sided tunneled PICC.    Patient tolerated procedure well.    Alan Menon MD ([place])  Interventional Radiology  [phone]        "

## 2020-09-28 NOTE — PLAN OF CARE
Patient tolerated procedure well. Sedation managed and administered by Anesthesia. Patient to recover in PACU and then go back to his room.

## 2020-09-28 NOTE — PROGRESS NOTES
Pharmacokinetic Assessment Follow Up: IV Vancomycin    Vancomycin serum concentration assessment(s):    The trough level was drawn correctly and can be used to guide therapy at this time. The measurement is within the desired definitive target range of 10 to 20 mcg/mL.    Vancomycin Regimen Plan:    Continue regimen to Vancomycin 750 mg IV every 12 hours. No follow up trough ordered at this time. Will monitor renal function and aim for weekly levels.     Drug levels (last 3 results):  Recent Labs   Lab Result Units 09/28/20  0834   Vancomycin-Trough ug/mL 13.8       Pharmacy will continue to follow and monitor vancomycin.    Please contact pharmacy at extension [phone] for questions regarding this assessment.    Thank you for the consult,   Raymundo Chakraborty       Patient brief summary:  Glen Moscoso is a 24 y.o. male initiated on antimicrobial therapy with IV Vancomycin for treatment of  PNA cx, wound osteo, and GI perf      Drug Allergies:   Review of patient's allergies indicates:  No Known Allergies    Actual Body Weight:   33.1kg    Renal Function:   Estimated Creatinine Clearance: 135.6 mL/min (A) (based on SCr of 0.4 mg/dL (L)).,     Dialysis Method (if applicable):  N/A    CBC (last 72 hours):  Recent Labs   Lab Result Units 09/26/20 0352 09/27/20 0425 09/28/20  0235   WBC K/uL 10.79 10.36 10.26   Hemoglobin g/dL 9.8* 10.9* 9.3*   Hematocrit % 32.1* 36.2* 30.4*   Platelets K/uL 586* 655* 631*   Gran% % 72.2 66.4 71.9   Lymph% % 17.0* 23.1 16.6*   Mono% % 10.3 10.0 11.0   Eosinophil% % 0.0 0.0 0.0   Basophil% % 0.2 0.2 0.2   Differential Method  Automated Automated Automated       Metabolic Panel (last 72 hours):  Recent Labs   Lab Result Units 09/26/20 0352 09/27/20 0425 09/28/20  0235   Sodium mmol/L 138 136 137   Potassium mmol/L 4.1 4.6 3.9   Chloride mmol/L 104 101 102   CO2 mmol/L 27 26 25   Glucose mg/dL 109 90 106   BUN, Bld mg/dL 6 7 7   Creatinine mg/dL 0.5 0.5 0.4*   Albumin g/dL 1.9* 2.2* 2.0*    Total Bilirubin mg/dL 0.1 0.2 0.1   Alkaline Phosphatase U/L 98 106 96   AST U/L 28 29 22   ALT U/L 25 27 20   Magnesium mg/dL 2.0 2.0 1.9   Phosphorus mg/dL 3.0 2.4* 2.8       Vancomycin Administrations:  vancomycin given in the last 96 hours                     vancomycin 750 mg in dextrose 5 % 250 mL IVPB (ready to mix system) (mg) 750 mg New Bag 09/23/20 1958     750 mg New Bag  0812     750 mg New Bag 09/22/20 2155     750 mg New Bag  0822     750 mg New Bag 09/21/20 2236     750 mg New Bag  0804    vancomycin in dextrose 5 % 1 gram/250 mL IVPB 1,000 mg (mg) 1,000 mg New Bag 09/20/20 2016                    Microbiologic Results:  Microbiology Results (last 7 days)     Procedure Component Value Units Date/Time    Blood culture [[id number]] Collected: 09/24/20 0533    Order Status: Completed Specimen: Blood Updated: 09/28/20 1012     Blood Culture, Routine No Growth to date      No Growth to date      No Growth to date      No Growth to date      No Growth to date    Blood culture [[id number]] Collected: 09/24/20 0533    Order Status: Completed Specimen: Blood Updated: 09/28/20 1012     Blood Culture, Routine No Growth to date      No Growth to date      No Growth to date      No Growth to date      No Growth to date    Blood culture x two cultures. Draw prior to antibiotics. [[id number]] Collected: 09/20/20 1853    Order Status: Completed Specimen: Blood from Peripheral, Hand, Right Updated: 09/25/20 2012     Blood Culture, Routine No growth after 5 days.    Narrative:      Aerobic and anaerobic    Blood culture x two cultures. Draw prior to antibiotics. [[id number]] Collected: 09/20/20 1854    Order Status: Completed Specimen: Blood from Peripheral, Hand, Right Updated: 09/25/20 2012     Blood Culture, Routine No growth after 5 days.    Narrative:      Aerobic and anaerobic

## 2020-09-28 NOTE — SUBJECTIVE & OBJECTIVE
Interval History: Patient just returned from IR procedure for tunneled IV line. Upon return to room but with significant upper airway and chest congestion and  and /110 with oxygen sats in low 80's. Respiratory called stat and patient placed on 10 liters of oxygen. Patient had aggressive NT suction done by respiratory. I came to bedside and bedside nurs and respiratory there. respiratory suctioned patient and significant amount of clear secretions suctioned. Nurse gave 1500 meds that patient missed including Propranolol and his Baclofen. After suctioning oxygen rodo came up to 100% on 10 liters via face mask so oxygen decreased to 5 liters via face mask. Patient appeared much more comfortable and relaxed after suctioning. Patient with improved HR to 125-130 range and /110. HR on monitor at bedside appeared to be sinus tachycardia. Patient doing well after my assessment and will place patient on continuous pulse ox for monitoring and monitor HR and BP for now as patient did miss his am meds which may have contributed to increased HR and BP.     Review of Systems   Unable to perform ROS: Patient nonverbal     Objective:     Vital Signs (Most Recent):  Temp: 98.8 °F (37.1 °C) (09/28/20 1515)  Pulse: (!) 119 (09/28/20 1515)  Resp: 20 (09/28/20 1515)  BP: (!) 136/92 (09/28/20 1515)  SpO2: 100 % (09/28/20 1637) Vital Signs (24h Range):  Temp:  [97.7 °F (36.5 °C)-99.4 °F (37.4 °C)] 98.8 °F (37.1 °C)  Pulse:  [] 119  Resp:  [16-24] 20  SpO2:  [88 %-100 %] 100 %  BP: (119-160)/() 136/92     Weight: 33.1 kg (72 lb 15.6 oz)  Body mass index is 15.79 kg/m².    Intake/Output Summary (Last 24 hours) at 9/28/2020 1651  Last data filed at 9/28/2020 1334  Gross per 24 hour   Intake 200 ml   Output 1350 ml   Net -1150 ml      Physical Exam  Vitals signs and nursing note reviewed.   Constitutional:       General: He is not in acute distress.     Appearance: He is well-developed. He is cachectic. He is  ill-appearing (Chronic).   Eyes:      Conjunctiva/sclera: Conjunctivae normal.   Neck:      Vascular: No JVD.   Cardiovascular:      Rate and Rhythm: Normal rate and regular rhythm.      Heart sounds: Normal heart sounds. No murmur. No friction rub. No gallop.    Pulmonary:      Effort: Pulmonary effort is normal. No respiratory distress.      Breath sounds: Rhonchi (Mainly upper airway sounds) present. No wheezing.   Abdominal:      General: Abdomen is flat. Bowel sounds are normal. There is no distension.      Palpations: Abdomen is soft.      Tenderness: There is no abdominal tenderness. There is no guarding.   Skin:     Capillary Refill: Capillary refill takes less than 2 seconds.      Findings: No erythema or rash.   Psychiatric:         Speech: He is noncommunicative.         Behavior: Behavior is cooperative.         Significant Labs:   CBC:   Recent Labs   Lab 09/27/20 0425 09/28/20  0235   WBC 10.36 10.26   HGB 10.9* 9.3*   HCT 36.2* 30.4*   * 631*     CMP:   Recent Labs   Lab 09/27/20 0425 09/28/20  0235    137   K 4.6 3.9    102   CO2 26 25   GLU 90 106   BUN 7 7   CREATININE 0.5 0.4*   CALCIUM 8.7 8.6*   PROT 7.0 6.7   ALBUMIN 2.2* 2.0*   BILITOT 0.2 0.1   ALKPHOS 106 96   AST 29 22   ALT 27 20   ANIONGAP 9 10   EGFRNONAA >60.0 >60.0     Magnesium:   Recent Labs   Lab 09/27/20 0425 09/28/20  0235   MG 2.0 1.9       Significant Imaging: I have reviewed all pertinent imaging results/findings within the past 24 hours.

## 2020-09-28 NOTE — PLAN OF CARE
No acute changes overnight. VSS. Tube feeds stopped at 0100. Wound care done. Pt resting. No needs at this time. Camera at bedside, will continue to monitor.

## 2020-09-28 NOTE — PLAN OF CARE
Patient brought from [facility] to  with NICOLA Trejo and Rolando SIEGEL. Mepilexes soiled in feces, changed to 4x4 and tegaderm for the procedure so that line could be placed without the wounds being open on backside.

## 2020-09-28 NOTE — H&P
Radiology History & Physical      SUBJECTIVE:     Chief Complaint: cerebral palsy    History of Present Illness:  Glen Moscoso is a 24 y.o. male who presents for tunneled PICC with anesthesia. Consent obtained via phone with mother Leni Moscoso.  Past Medical History:   Diagnosis Date    Acid reflux     Cerebral palsy     History of hip surgery     S/P percutaneous endoscopic gastrostomy (PEG) tube placement     Seizures      Past Surgical History:   Procedure Laterality Date    CHOLECYSTECTOMY      FLEXIBLE SIGMOIDOSCOPY N/A 9/9/2020    Procedure: SIGMOIDOSCOPY, FLEXIBLE;  Surgeon: America Goode MD;  Location: [address]);  Service: Endoscopy;  Laterality: N/A;    HIP SURGERY         Home Meds:   Prior to Admission medications    Medication Sig Start Date End Date Taking? Authorizing Provider   ascorbic acid, vitamin C, (VITAMIN C) 500 MG tablet 1 tablet (500 mg total) by Per G Tube route once daily. 9/16/20   Nohemi Farris MD   baclofen (LIORESAL) 10 MG tablet 1 tablet (10 mg total) by Per G Tube route 3 (three) times daily. 9/15/20   Nohemi Farris MD   cholestyramine (QUESTRAN) 4 gram packet Take 1 packet (4 g total) by mouth 2 (two) times daily. 8/18/20 8/18/21  Mattie Muñoz NP   diphenhydrAMINE (BENADRYL) 12.5 mg/5 mL elixir 10 mLs (25 mg total) by Per G Tube route 4 (four) times daily as needed for Allergies. 9/15/20   Nohemi Farris MD   gabapentin (NEURONTIN) 250 mg/5 mL solution 5 mLs (250 mg total) by Per G Tube route nightly. At bedtime 9/15/20   Nohemi Farris MD   ibuprofen (ADVIL,MOTRIN) 100 mg/5 mL suspension 10 mLs (200 mg total) by Per G Tube route every 6 (six) hours as needed for Pain or Temperature greater than. 9/15/20   Nohemi Farris MD   Lactobacillus rhamnosus GG (CULTURELLE) 10 billion cell capsule 1 capsule by Per G Tube route once daily. 9/15/20   Nohemi Farris MD   multivitamin liquid no.118 Liqd 10 mLs by Per G Tube route once  daily. 9/16/20   Nohemi Farris MD   nystatin (MYCOSTATIN) cream Apply topically 2 (two) times daily.    Historical Provider   PHENobarbitaL 20 mg/5 mL (4 mg/mL) Elix elixir Take 25 mLs (100 mg total) by mouth every evening. 9/15/20   Nohemi Farris MD   propranoloL (INDERAL) 20 MG tablet 1 tablet (20 mg total) by Per G Tube route 3 (three) times daily. 9/15/20 9/15/21  Nohemi Farris MD     Anticoagulants/Antiplatelets: no anticoagulation    Allergies: Review of patient's allergies indicates:  No Known Allergies  Sedation History:  no adverse reactions    Review of Systems:     Unable to perform ROS due to cerebral palsy          OBJECTIVE:     Vital Signs (Most Recent)  Temp: 97.7 °F (36.5 °C) (09/28/20 0730)  Pulse: 110 (09/28/20 0730)  Resp: 20 (09/28/20 0730)  BP: 123/73 (09/28/20 0730)  SpO2: (!) 94 % (09/28/20 0826)    Physical Exam:  ASA: per anesthesia  Mallampati: per anesthesia    General: no acute distress  Mental Status: alert and oriented to person, place and time  HEENT: normocephalic, atraumatic  Chest: unlabored breathing  Heart: regular heart rate  Abdomen: nondistended  Extremity: moves all extremities    Laboratory  Lab Results   Component Value Date    INR 1.1 09/01/2020       Lab Results   Component Value Date    WBC 10.26 09/28/2020    HGB 9.3 (L) 09/28/2020    HCT 30.4 (L) 09/28/2020    MCV 97 09/28/2020     (H) 09/28/2020      Lab Results   Component Value Date     09/28/2020     09/28/2020    K 3.9 09/28/2020     09/28/2020    CO2 25 09/28/2020    BUN 7 09/28/2020    CREATININE 0.4 (L) 09/28/2020    CALCIUM 8.6 (L) 09/28/2020    MG 1.9 09/28/2020    ALT 20 09/28/2020    AST 22 09/28/2020    ALBUMIN 2.0 (L) 09/28/2020    BILITOT 0.1 09/28/2020       ASSESSMENT/PLAN:     Sedation Plan: per anesthesia  Patient will undergo tunneled PICC.    Tico Enamorado MD  PGY-II, Radiology

## 2020-09-28 NOTE — SIGNIFICANT EVENT
Patient just returned from IR procedure for tunneled IV line. Upon return to room but with significant upper airway and chest congestion and  and /110 with oxygen sats in low 80's. Respiratory called stat and patient placed on 10 liters of oxygen. Patient had aggressive NT suction done by respiratory. I came to bedside and bedside nurs and respiratory there. respiratory suctioned patient and significant amount of clear secretions suctioned. Nurse gave 1500 meds that patient missed including Propranolol and his Baclofen. After suctioning oxygen rodo came up to 100% on 10 liters via face mask so oxygen decreased to 5 liters via face mask. Patient appeared much more comfortable and relaxed after suctioning. Patient with improved HR to 125-130 range and /110. HR on monitor at bedside appeared to be sinus tachycardia. Patient doing well after my assessment and will place patient on continuous pulse ox for monitoring and monitor HR and BP for now as patient did miss his am meds which may have contributed to increased HR and BP.     LALITO HUNTER MD  Attending Staff Physician   Department of Hospital Medicine, [facility] on [facility]  Pager: [phone]  Spectralink: [phone]

## 2020-09-28 NOTE — NURSING
Pt placed on telemetry box with continuous cardiac monitoring and continuous pulse ox. Verified with tele that rhythm is visible. Will continue to monitor.

## 2020-09-28 NOTE — NURSING TRANSFER
Nursing Transfer Note      9/28/2020     Transfer to [id number]    Transfer via bed    Transfer with chart, port o2 tank 4L     Transported by nurse tech    Medicines sent: none    Chart send with patient: yes    Notified: Vernell SIEGEL Floor    Patient reassessed at: 9/28/20 1540    Pt trans to floor, Rm#[id number]. [place] via bed. Stable for trans, NAD, VSS. Port O2 tank 4L. Report was called to floor nurseVernell.

## 2020-09-28 NOTE — NURSING
Pt's vital signs unstable, SPO2 86% on room air, BP elevated and -160s. Pt's breathing labored and audible congestion noted. Pt placed on 10L simple face mask and SPO2 improved. Respiratory contacted and Dr. Barnes contacted. Respiratory completed NT suctioning. Pt missed all of meds due to being in procedure. 1500 medicines given (including propanolol), cardiac monitoring and continuous pulse ox ordered. Pt on portable monitor at this time. VSS improving, -110s, SPO2 97% on 2L, BP WNL. Pt's mother contacted and updated. Will continue to monitor.

## 2020-09-28 NOTE — PLAN OF CARE
Problem: Adult Inpatient Plan of Care  Goal: Plan of Care Review  Outcome: Ongoing, Progressing  Goal: Patient-Specific Goal (Individualization)  Outcome: Ongoing, Progressing  Flowsheets (Taken 9/28/2020 1834)  Individualized Care Needs: continuous pulse ox and tele  Anxieties, Fears or Concerns: not being able to breathe  Patient-Specific Goals (Include Timeframe): NICOLA  Goal: Optimal Comfort and Wellbeing  Outcome: Ongoing, Progressing  Intervention: Provide Person-Centered Care  Flowsheets (Taken 9/28/2020 1834)  Trust Relationship/Rapport:   emotional support provided   thoughts/feelings acknowledged   reassurance provided     Problem: Infection (Sepsis/Septic Shock)  Goal: Absence of Infection Signs/Symptoms  Outcome: Ongoing, Progressing  Intervention: Prevent or Manage Infection Progression  Flowsheets (Taken 9/28/2020 1834)  Infection Prevention:   rest/sleep promoted   environmental surveillance performed  Infection Management: aseptic technique maintained     Pt nonverbal, awake and alert. Pt went for PICC line placement today. When pt returned vital signs were unstable. Pt placed on 02 and medications given. Vital signs improved. Tube feeds still held until pt's SPO2 reaches normal limits. Mother contacted and updated on pt status. Will continue to monitor.

## 2020-09-28 NOTE — ASSESSMENT & PLAN NOTE
Pneumoperitoneum  Colitis  · Sepsis resolved with WBC normal and patient afebrile for past 4 days. If fevers recur then likely will need to re-image abdomen. Blood cultures remain no growth.   · General surgery consulted on admit for pneumoperitoneum seen on admit CT scan of abdomen and pelvis. General surgery felt likely contained leak and very small bowel perforation related to recent colitis. Due to patient's very poor functional level at baseline and malnutrition felt to be very poor surgical candidate and recommended conservative.  · ID consulted and recommended IV Vancomycin, Cefepime and Flagyl to treat both small perforation and osteomyelitis of pelvic area and to extend abx until 10/22. IR to place tunneled catheter on 9/28 for long term antibiotics as current midline cannot be extended that long in future and bedside PICC team unable to place bedside PICC due to severe arm contractures. IR to coordinate with Anesthesia on 9/28 to place tunneled line. Hold tube feedings at midnight on Sunday, 9/27 for tunneled line on 9/28.   · ID recommends repeat CT scan of abdomen to re-evaluate perforation in 2-3 weeks or sooner if any acute worsening.  · WBC improving with abx but still with fever so will need to monitor closely as with patient's poor baseline function/noncommunicative status clinical assessment of abdominal worsening more difficult to assess.   · Patient with no further diarrhea.

## 2020-09-29 LAB
ALBUMIN SERPL BCP-MCNC: 2.2 G/DL (ref 3.5–5.2)
ALP SERPL-CCNC: 108 U/L (ref 55–135)
ALT SERPL W/O P-5'-P-CCNC: 19 U/L (ref 10–44)
ANION GAP SERPL CALC-SCNC: 11 MMOL/L (ref 8–16)
AST SERPL-CCNC: 21 U/L (ref 10–40)
BACTERIA BLD CULT: NORMAL
BACTERIA BLD CULT: NORMAL
BASOPHILS # BLD AUTO: 0.02 K/UL (ref 0–0.2)
BASOPHILS NFR BLD: 0.2 % (ref 0–1.9)
BILIRUB SERPL-MCNC: 0.2 MG/DL (ref 0.1–1)
BUN SERPL-MCNC: 7 MG/DL (ref 6–20)
CALCIUM SERPL-MCNC: 8.9 MG/DL (ref 8.7–10.5)
CHLORIDE SERPL-SCNC: 103 MMOL/L (ref 95–110)
CO2 SERPL-SCNC: 25 MMOL/L (ref 23–29)
CREAT SERPL-MCNC: 0.4 MG/DL (ref 0.5–1.4)
DIFFERENTIAL METHOD: ABNORMAL
EOSINOPHIL # BLD AUTO: 0 K/UL (ref 0–0.5)
EOSINOPHIL NFR BLD: 0.1 % (ref 0–8)
ERYTHROCYTE [DISTWIDTH] IN BLOOD BY AUTOMATED COUNT: 14.6 % (ref 11.5–14.5)
EST. GFR  (AFRICAN AMERICAN): >60 ML/MIN/1.73 M^2
EST. GFR  (NON AFRICAN AMERICAN): >60 ML/MIN/1.73 M^2
GLUCOSE SERPL-MCNC: 77 MG/DL (ref 70–110)
HCT VFR BLD AUTO: 30.4 % (ref 40–54)
HGB BLD-MCNC: 9.2 G/DL (ref 14–18)
IMM GRANULOCYTES # BLD AUTO: 0.04 K/UL (ref 0–0.04)
IMM GRANULOCYTES NFR BLD AUTO: 0.4 % (ref 0–0.5)
LYMPHOCYTES # BLD AUTO: 1.8 K/UL (ref 1–4.8)
LYMPHOCYTES NFR BLD: 16.7 % (ref 18–48)
MAGNESIUM SERPL-MCNC: 1.8 MG/DL (ref 1.6–2.6)
MCH RBC QN AUTO: 29.5 PG (ref 27–31)
MCHC RBC AUTO-ENTMCNC: 30.3 G/DL (ref 32–36)
MCV RBC AUTO: 97 FL (ref 82–98)
MONOCYTES # BLD AUTO: 1.1 K/UL (ref 0.3–1)
MONOCYTES NFR BLD: 10.1 % (ref 4–15)
NEUTROPHILS # BLD AUTO: 7.9 K/UL (ref 1.8–7.7)
NEUTROPHILS NFR BLD: 72.5 % (ref 38–73)
NRBC BLD-RTO: 0 /100 WBC
PHOSPHATE SERPL-MCNC: 4.1 MG/DL (ref 2.7–4.5)
PLATELET # BLD AUTO: 632 K/UL (ref 150–350)
PMV BLD AUTO: 9.3 FL (ref 9.2–12.9)
POTASSIUM SERPL-SCNC: 3.9 MMOL/L (ref 3.5–5.1)
PROT SERPL-MCNC: 6.7 G/DL (ref 6–8.4)
RBC # BLD AUTO: 3.12 M/UL (ref 4.6–6.2)
SODIUM SERPL-SCNC: 139 MMOL/L (ref 136–145)
WBC # BLD AUTO: 10.94 K/UL (ref 3.9–12.7)

## 2020-09-29 PROCEDURE — 85025 COMPLETE CBC W/AUTO DIFF WBC: CPT

## 2020-09-29 PROCEDURE — 63600175 PHARM REV CODE 636 W HCPCS: Performed by: HOSPITALIST

## 2020-09-29 PROCEDURE — 94761 N-INVAS EAR/PLS OXIMETRY MLT: CPT

## 2020-09-29 PROCEDURE — 83735 ASSAY OF MAGNESIUM: CPT

## 2020-09-29 PROCEDURE — 80053 COMPREHEN METABOLIC PANEL: CPT

## 2020-09-29 PROCEDURE — 25000003 PHARM REV CODE 250: Performed by: HOSPITALIST

## 2020-09-29 PROCEDURE — 99232 SBSQ HOSP IP/OBS MODERATE 35: CPT | Mod: ,,, | Performed by: INTERNAL MEDICINE

## 2020-09-29 PROCEDURE — 20600001 HC STEP DOWN PRIVATE ROOM

## 2020-09-29 PROCEDURE — 84100 ASSAY OF PHOSPHORUS: CPT

## 2020-09-29 PROCEDURE — 99900026 HC AIRWAY MAINTENANCE (STAT)

## 2020-09-29 PROCEDURE — 99232 PR SUBSEQUENT HOSPITAL CARE,LEVL II: ICD-10-PCS | Mod: ,,, | Performed by: INTERNAL MEDICINE

## 2020-09-29 PROCEDURE — 25000003 PHARM REV CODE 250: Performed by: INTERNAL MEDICINE

## 2020-09-29 PROCEDURE — 63600175 PHARM REV CODE 636 W HCPCS: Performed by: INTERNAL MEDICINE

## 2020-09-29 PROCEDURE — 99900035 HC TECH TIME PER 15 MIN (STAT)

## 2020-09-29 PROCEDURE — 36415 COLL VENOUS BLD VENIPUNCTURE: CPT

## 2020-09-29 PROCEDURE — 94668 MNPJ CHEST WALL SBSQ: CPT

## 2020-09-29 RX ORDER — CEFEPIME HYDROCHLORIDE 1 G/1
1 INJECTION, POWDER, FOR SOLUTION INTRAMUSCULAR; INTRAVENOUS
Status: DISCONTINUED | OUTPATIENT
Start: 2020-09-29 | End: 2020-10-08

## 2020-09-29 RX ADMIN — PHENOBARBITAL 100 MG: 20 ELIXIR ORAL at 10:09

## 2020-09-29 RX ADMIN — PROPRANOLOL HYDROCHLORIDE 20 MG: 20 TABLET ORAL at 03:09

## 2020-09-29 RX ADMIN — METRONIDAZOLE 500 MG: 500 TABLET ORAL at 09:09

## 2020-09-29 RX ADMIN — BACLOFEN 10 MG: 10 TABLET ORAL at 03:09

## 2020-09-29 RX ADMIN — LOPERAMIDE HYDROCHLORIDE 2 MG: 1 SOLUTION ORAL at 09:09

## 2020-09-29 RX ADMIN — CEFEPIME 2 G: 2 INJECTION, POWDER, FOR SOLUTION INTRAVENOUS at 10:09

## 2020-09-29 RX ADMIN — CEFEPIME 1 G: 1 INJECTION, POWDER, FOR SOLUTION INTRAMUSCULAR; INTRAVENOUS at 05:09

## 2020-09-29 RX ADMIN — VANCOMYCIN HYDROCHLORIDE 750 MG: 750 INJECTION, POWDER, LYOPHILIZED, FOR SOLUTION INTRAVENOUS at 09:09

## 2020-09-29 RX ADMIN — Medication 1 CAPSULE: at 09:09

## 2020-09-29 RX ADMIN — PROPRANOLOL HYDROCHLORIDE 20 MG: 20 TABLET ORAL at 09:09

## 2020-09-29 RX ADMIN — Medication 10 ML: at 09:09

## 2020-09-29 RX ADMIN — LOPERAMIDE HYDROCHLORIDE 2 MG: 1 SOLUTION ORAL at 05:09

## 2020-09-29 RX ADMIN — LOPERAMIDE HYDROCHLORIDE 2 MG: 1 SOLUTION ORAL at 03:09

## 2020-09-29 RX ADMIN — BACLOFEN 10 MG: 10 TABLET ORAL at 09:09

## 2020-09-29 RX ADMIN — ACETAMINOPHEN 650 MG: 325 TABLET ORAL at 03:09

## 2020-09-29 RX ADMIN — Medication 500 MG: at 09:09

## 2020-09-29 RX ADMIN — GABAPENTIN 250 MG: 250 SOLUTION ORAL at 09:09

## 2020-09-29 RX ADMIN — METRONIDAZOLE 500 MG: 500 TABLET ORAL at 03:09

## 2020-09-29 NOTE — PLAN OF CARE
09/29/20 1828   Discharge Reassessment   Assessment Type Discharge Planning Reassessment   Discharge Plan A Long-term acute care facility (LTAC)   Discharge Plan B Home Health  (Current with [facility])   DME Needed Upon Discharge  other (see comments)  (TBD)   Anticipated Discharge Disposition LTAC   Post-Acute Status   Post-Acute Authorization Placement   Post-Acute Placement Status Awaiting Internal Medical Clearance

## 2020-09-29 NOTE — ANESTHESIA POSTPROCEDURE EVALUATION
Anesthesia Post Evaluation    Patient: Glen Moscoso    Procedure(s) Performed: Procedure(s) (LRB):  Insertion,catheter,tunneled (Right)    Final Anesthesia Type: general    Patient location during evaluation: PACU  Patient participation: Yes- Able to Participate  Level of consciousness: awake  Post-procedure vital signs: reviewed and stable  Pain management: adequate  Airway patency: patent    PONV status at discharge: No PONV  Anesthetic complications: no      Cardiovascular status: blood pressure returned to baseline  Respiratory status: unassisted  Hydration status: euvolemic  Follow-up not needed.          Vitals Value Taken Time   /101 09/28/20 1523   Temp 37.1 °C (98.8 °F) 09/28/20 1515   Pulse 123 09/28/20 1525   Resp 20 09/28/20 1515   SpO2 98 % 09/28/20 1525   Vitals shown include unvalidated device data.      No case tracking events are documented in the log.      Pain/Lulú Score: Pain Rating Prior to Med Admin: 4 (9/28/2020  2:40 PM)  Lulú Score: 7 (9/28/2020  2:30 PM)

## 2020-09-29 NOTE — PLAN OF CARE
Overnight, pt weaned off of simple mask. Pt now on room air with sats >92%. Wound care performed. TF to be restarted. [employer] camera at bedside. WCTM

## 2020-09-29 NOTE — ASSESSMENT & PLAN NOTE
Pneumoperitoneum  Colitis  · Sepsis resolved with WBC normal and patient afebrile for past 5 days. If fevers recur then likely will need to re-image abdomen. Blood cultures remain no growth. Patient medically stable for discharge to LTAC and CM/SW working on placement for patient for continued medical care.   · General surgery consulted on admit for pneumoperitoneum seen on admit CT scan of abdomen and pelvis. General surgery felt likely contained leak and very small bowel perforation related to recent colitis. Due to patient's very poor functional level at baseline and malnutrition felt to be very poor surgical candidate and recommended conservative.  · ID consulted and recommended IV Vancomycin, Cefepime and Flagyl to treat both small perforation and osteomyelitis of pelvic area and to extend abx until 10/22. IR to place tunneled catheter on 9/28 for long term antibiotics as current midline cannot be extended that long in future and bedside PICC team unable to place bedside PICC due to severe arm contractures. IR to coordinate with Anesthesia on 9/28 to place tunneled line. Hold tube feedings at midnight on Sunday, 9/27 for tunneled line on 9/28.   · ID recommends repeat CT scan of abdomen to re-evaluate perforation in 2-3 weeks or sooner if any acute worsening.  · WBC improving with abx but still with fever so will need to monitor closely as with patient's poor baseline function/noncommunicative status clinical assessment of abdominal worsening more difficult to assess.   · Patient with no further diarrhea.

## 2020-09-29 NOTE — SUBJECTIVE & OBJECTIVE
Interval History: Patient had episode of hypoxia, tachycardia and hypertension after coming back from tunneled cath yesterday from IR. Respiratory called as patient noted to have significant upper air way congestion and patient had aggressive nasotracheal suctioning done by respiratory.and improved after suctioning. This am patient's HR and BP have normalized and patient back on room air with sats > 90%.Patient tolerating tube feedings without difficulty. Patient remains afebrile and stable WBC. Patient is medically stable for discharge to LTAC.     Review of Systems   Unable to perform ROS: Patient nonverbal     Objective:     Vital Signs (Most Recent):  Temp: 98 °F (36.7 °C) (09/29/20 1206)  Pulse: 104 (09/29/20 1206)  Resp: 18 (09/29/20 1206)  BP: 134/84 (09/29/20 1206)  SpO2: 100 % (09/29/20 1206) Vital Signs (24h Range):  Temp:  [96.8 °F (36 °C)-98.8 °F (37.1 °C)] 98 °F (36.7 °C)  Pulse:  [104-156] 104  Resp:  [16-28] 18  SpO2:  [86 %-100 %] 100 %  BP: (124-170)/() 134/84     Weight: 33.1 kg (72 lb 15.6 oz)  Body mass index is 15.79 kg/m².    Intake/Output Summary (Last 24 hours) at 9/29/2020 1549  Last data filed at 9/29/2020 1500  Gross per 24 hour   Intake 120 ml   Output 750 ml   Net -630 ml      Physical Exam  Vitals signs and nursing note reviewed.   Constitutional:       General: He is not in acute distress.     Appearance: He is well-developed. He is cachectic. He is ill-appearing (Chronic).   Eyes:      Conjunctiva/sclera: Conjunctivae normal.   Neck:      Vascular: No JVD.   Cardiovascular:      Rate and Rhythm: Normal rate and regular rhythm.      Heart sounds: Normal heart sounds. No murmur. No friction rub. No gallop.    Pulmonary:      Effort: Pulmonary effort is normal. No respiratory distress.      Breath sounds: Normal breath sounds. No wheezing.   Abdominal:      General: Abdomen is flat. Bowel sounds are normal. There is no distension.      Palpations: Abdomen is soft.      Tenderness:  There is no abdominal tenderness. There is no guarding.   Skin:     Capillary Refill: Capillary refill takes less than 2 seconds.      Findings: No erythema or rash.   Psychiatric:         Speech: He is noncommunicative.         Behavior: Behavior is cooperative.         Significant Labs:   CBC:   Recent Labs   Lab 09/28/20 0235 09/29/20  0351   WBC 10.26 10.94   HGB 9.3* 9.2*   HCT 30.4* 30.4*   * 632*     CMP:   Recent Labs   Lab 09/28/20 0235 09/29/20  0350    139   K 3.9 3.9    103   CO2 25 25    77   BUN 7 7   CREATININE 0.4* 0.4*   CALCIUM 8.6* 8.9   PROT 6.7 6.7   ALBUMIN 2.0* 2.2*   BILITOT 0.1 0.2   ALKPHOS 96 108   AST 22 21   ALT 20 19   ANIONGAP 10 11   EGFRNONAA >60.0 >60.0     Magnesium:   Recent Labs   Lab 09/28/20 0235 09/29/20  0350   MG 1.9 1.8       Significant Imaging: I have reviewed all pertinent imaging results/findings within the past 24 hours.

## 2020-09-29 NOTE — PLAN OF CARE
Problem: Adult Inpatient Plan of Care  Goal: Plan of Care Review  Outcome: Ongoing, Progressing   POC reviewed. VSS, on room air. TF at goal 40 cc/hr. Turned q2h. Wound care completed. No acute changes. Safety maintained. Will continue to monitor.

## 2020-09-29 NOTE — ASSESSMENT & PLAN NOTE
· Resolved on 9/29 and level 4.1.   · Improved level at 3.0 on 9/26 and stable at 2.4 on 9/27 and 2.8 on 9/28.   · Phosphorus level 1.5 on 9/25 consistent with hypophosphatemia and treated with Sodium Phosphate 20 mmol IV for 1 dose(s).  · Monitor daily Phosphorus level and replace prn.

## 2020-09-29 NOTE — ASSESSMENT & PLAN NOTE
"Pressure injury of right ischium, stage 4  Osteomyelitis of pelvis  · Controlled. Wound care consulted and managing and wound care and preventive measures as per wound care recommendations. Wounds appear clean with no definite signs of infection.   · As per wound care "Nursing to cleanse scrotal skin breakdown and R and L ischial pressure injuries with wound cleanser. Pack ischial pressure injuries with Triad ointment and gauze, then cover bordered foam dressing to prove autolytic debridement and prevent breakdown of intact skin. Apply Triad ointment over scrotal skin breakdown."  · Patient now with exposed bone to ischial areas and ID with concern for underlying osteomyelitis even though wounds themselves do not appear infected so recommend extension of long term abx with IV Vancomycin, Cefepime and po Flagyl until at least 10/22. Will need PICC line as midline about ready to  as per ID recs and IR placed tunneled line on  for long term antibiotics to right chest wall.   "

## 2020-09-29 NOTE — PROGRESS NOTES
[facility] Hospital Medicine  Progress Note    Patient Name: Glen Moscoso  MRN: [id number]  Patient Class: IP- Inpatient   Admission Date: 9/20/2020  Length of Stay: 9 days  Attending Physician: Nohemi Farris MD  Primary Care Provider: Yadi Lawson MD    Hospital Medicine Team: [facility] Nohemi Farris MD    Subjective:     Principal Problem:Sepsis        HPI:  Mr. Glen Moscoso is a 24 y.o. male with CP, complicated by seizures and a peg tube, who presents to the ER for evaluation of fever.  He just had a prolonged and complex hospital stay at [facility] from 8/26-9/18 for seizures and pneumonia.  He was originally admitted to [facility], where he was found to have Pseudomonas and Serratia pneumonia, as well as a decubitus ulcer that was debrided by Gen Surg on 9/7 - cultures were not sent.  He was discharged home 9/18 on Zosyn via LUE PICC until 9/24.  Family at bedside reports he was doing well until the day of admission when he started to have fevers up to 101.2F.  They deny any new respiratory complaints.  Given his recent hospitalization and fever while on antibiotics, they brought him to the ER for further evaluation.     Upon arrival to the ER, vitals were temp 101.2F, , RR 24 and /75.  Labs showed WBC 17 with ESR 90 and Procal 0.56.  CXR didn't show a new consolidation.  His case was discussed with ID, who suggested Vanc, Cefepime, and Flagyl based on his previous cultures, as well as CT chest/abdomen/pelvis to include sacral wounds.  He was admitted to Hospital Medicine for further management.    Overview/Hospital Course:  CT scan of abdomen and pelvis done on admit showed overnight small amount of free air and general surgery consulted and evaluated patient who recommended conservative management with antibiotics and close monitoring as felt high surgical risk and likely contained small colonic perforation. ID consulted and patient placed on IV Cefepime/Flagyl and  Vancomycin. G tube study done and no air leak noted. Patient on long term abx as outpatient for infected bilateral Stage IV ischial ulcers and recent bout of Serratia and Pseudomonal pneumonia. Antibiotics and wound care for ulcers continued on this admit. Patient with severe cerebral palsy and bed bound/wheelchair bound at baseline. Blood cultures from admit are no growth to date. Patient with no further fever since admit on 9/24 and WBC improving. Plan to continue broad spectrum abx and conservative care for small colonic perforation. Patient back on home TF and tolerating. ID recommending to extend current antibiotic regimen to treat bowel perforation and pelvic osteomyelitis until 10/22. Patient only with midline and needs PICC line for longer term antibiotic treatment. PICC line team consulted but stated due to patient's severe arm contractures PICC cannot be placed. PICC team recommended to consult IR for tunneled catheter amd IIR consulted on 9/25 and plan to place line on 9/28 with assistance of anesthesia. IR states to hold tube feedings at midnight on Sunday for line placement on 9/28. Patient had tunneled line placed by IR to right chest wall on 9/28. Patient had issues after line placed with secretions and became hypoxic but aggressively suctioned and improved and patient much improved and back off oxygen on 9/29. Patient is now medically ready to discharge to an LTAC but / states still no accepting facility at this time and thus also no insurance authorization. Patient continues to tolerate TF and patient not having any diarrhea which is an improvement from previous hospital stay.     Interval History: Patient had episode of hypoxia, tachycardia and hypertension after coming back from tunneled cath yesterday from IR. Respiratory called as patient noted to have significant upper air way congestion and patient had aggressive nasotracheal suctioning done by respiratory.and improved after suctioning.  This am patient's HR and BP have normalized and patient back on room air with sats > 90%.Patient tolerating tube feedings without difficulty. Patient remains afebrile and stable WBC. Patient is medically stable for discharge to LTAC.     Review of Systems   Unable to perform ROS: Patient nonverbal     Objective:     Vital Signs (Most Recent):  Temp: 98 °F (36.7 °C) (09/29/20 1206)  Pulse: 104 (09/29/20 1206)  Resp: 18 (09/29/20 1206)  BP: 134/84 (09/29/20 1206)  SpO2: 100 % (09/29/20 1206) Vital Signs (24h Range):  Temp:  [96.8 °F (36 °C)-98.8 °F (37.1 °C)] 98 °F (36.7 °C)  Pulse:  [104-156] 104  Resp:  [16-28] 18  SpO2:  [86 %-100 %] 100 %  BP: (124-170)/() 134/84     Weight: 33.1 kg (72 lb 15.6 oz)  Body mass index is 15.79 kg/m².    Intake/Output Summary (Last 24 hours) at 9/29/2020 1549  Last data filed at 9/29/2020 1500  Gross per 24 hour   Intake 120 ml   Output 750 ml   Net -630 ml      Physical Exam  Vitals signs and nursing note reviewed.   Constitutional:       General: He is not in acute distress.     Appearance: He is well-developed. He is cachectic. He is ill-appearing (Chronic).   Eyes:      Conjunctiva/sclera: Conjunctivae normal.   Neck:      Vascular: No JVD.   Cardiovascular:      Rate and Rhythm: Normal rate and regular rhythm.      Heart sounds: Normal heart sounds. No murmur. No friction rub. No gallop.    Pulmonary:      Effort: Pulmonary effort is normal. No respiratory distress.      Breath sounds: Normal breath sounds. No wheezing.   Abdominal:      General: Abdomen is flat. Bowel sounds are normal. There is no distension.      Palpations: Abdomen is soft.      Tenderness: There is no abdominal tenderness. There is no guarding.   Skin:     Capillary Refill: Capillary refill takes less than 2 seconds.      Findings: No erythema or rash.   Psychiatric:         Speech: He is noncommunicative.         Behavior: Behavior is cooperative.         Significant Labs:   CBC:   Recent Labs   Lab  09/28/20  0235 09/29/20  0351   WBC 10.26 10.94   HGB 9.3* 9.2*   HCT 30.4* 30.4*   * 632*     CMP:   Recent Labs   Lab 09/28/20  0235 09/29/20  0350    139   K 3.9 3.9    103   CO2 25 25    77   BUN 7 7   CREATININE 0.4* 0.4*   CALCIUM 8.6* 8.9   PROT 6.7 6.7   ALBUMIN 2.0* 2.2*   BILITOT 0.1 0.2   ALKPHOS 96 108   AST 22 21   ALT 20 19   ANIONGAP 10 11   EGFRNONAA >60.0 >60.0     Magnesium:   Recent Labs   Lab 09/28/20 0235 09/29/20  0350   MG 1.9 1.8       Significant Imaging: I have reviewed all pertinent imaging results/findings within the past 24 hours.      Assessment/Plan:      * Sepsis  Pneumoperitoneum  Colitis  · Sepsis resolved with WBC normal and patient afebrile for past 5 days. If fevers recur then likely will need to re-image abdomen. Blood cultures remain no growth. Patient medically stable for discharge to LTAC and CM/SW working on placement for patient for continued medical care.   · General surgery consulted on admit for pneumoperitoneum seen on admit CT scan of abdomen and pelvis. General surgery felt likely contained leak and very small bowel perforation related to recent colitis. Due to patient's very poor functional level at baseline and malnutrition felt to be very poor surgical candidate and recommended conservative.  · ID consulted and recommended IV Vancomycin, Cefepime and Flagyl to treat both small perforation and osteomyelitis of pelvic area and to extend abx until 10/22. IR to place tunneled catheter on 9/28 for long term antibiotics as current midline cannot be extended that long in future and bedside PICC team unable to place bedside PICC due to severe arm contractures. IR to coordinate with Anesthesia on 9/28 to place tunneled line. Hold tube feedings at midnight on Sunday, 9/27 for tunneled line on 9/28.   · ID recommends repeat CT scan of abdomen to re-evaluate perforation in 2-3 weeks or sooner if any acute worsening.  · WBC improving with abx but  "still with fever so will need to monitor closely as with patient's poor baseline function/noncommunicative status clinical assessment of abdominal worsening more difficult to assess.   · Patient with no further diarrhea.       Decubitus ulcer of ischial area, left, stage IV  Pressure injury of right ischium, stage 4  Osteomyelitis of pelvis  · Controlled. Wound care consulted and managing and wound care and preventive measures as per wound care recommendations. Wounds appear clean with no definite signs of infection.   · As per wound care "Nursing to cleanse scrotal skin breakdown and R and L ischial pressure injuries with wound cleanser. Pack ischial pressure injuries with Triad ointment and gauze, then cover bordered foam dressing to prove autolytic debridement and prevent breakdown of intact skin. Apply Triad ointment over scrotal skin breakdown."  · Patient now with exposed bone to ischial areas and ID with concern for underlying osteomyelitis even though wounds themselves do not appear infected so recommend extension of long term abx with IV Vancomycin, Cefepime and po Flagyl until at least 10/22. Will need PICC line as midline about ready to  as per ID recs and IR placed tunneled line on  for long term antibiotics to right chest wall.     Severe protein-calorie malnutrition  · Present on admit and related to colitis, recent medical illnesses. Patient with PEG and receiving TF with Prebio to help with nutrition. Likely cause of poor wound healing and development of ischial ulcers.   · Nutrition consulted and following and continue Peptamen with Prebio at 40 mL/hr as per nutrition recs to treat malnutrition.       Seizure disorder  Chronic and controlled and continue Phenobarbital to treat.       Anemia of chronic disease  Chronic and controlled and related to chronic infection, poor nutrition and poor baseline function status. Monitor anemia with daily CBC and transfuse if Hgb < 7. "       Hypophosphatemia  · Resolved on 9/29 and level 4.1.   · Improved level at 3.0 on 9/26 and stable at 2.4 on 9/27 and 2.8 on 9/28.   · Phosphorus level 1.5 on 9/25 consistent with hypophosphatemia and treated with Sodium Phosphate 20 mmol IV for 1 dose(s).  · Monitor daily Phosphorus level and replace prn.     Cerebral palsy  Chronic condition. Patient with poor baseline functional status and non-communicative and bed bound/wheelchair bound with extremity contractures to all extremities at baseline.         VTE Risk Mitigation (From admission, onward)         Ordered     IP VTE HIGH RISK PATIENT  Once      09/28/20 1211     Place sequential compression device  Until discontinued      09/28/20 1211                Discharge Planning   ROCÍO: 9/30/2020     Code Status: Full Code   Is the patient medically ready for discharge?: Yes    Reason for patient still in hospital (select all that apply): Other (specify) Patietn medically ready for dischareg and awaiting LTAC placement  Discharge Plan A: Long-term acute care facility (LTAC)   Discharge Delays: None known at this time              Nohemi Farris MD  Department of Hospital Medicine   [facility]

## 2020-09-30 LAB
ALBUMIN SERPL BCP-MCNC: 2 G/DL (ref 3.5–5.2)
ALP SERPL-CCNC: 101 U/L (ref 55–135)
ALT SERPL W/O P-5'-P-CCNC: 14 U/L (ref 10–44)
ANION GAP SERPL CALC-SCNC: 9 MMOL/L (ref 8–16)
AST SERPL-CCNC: 14 U/L (ref 10–40)
BASOPHILS # BLD AUTO: 0.02 K/UL (ref 0–0.2)
BASOPHILS NFR BLD: 0.2 % (ref 0–1.9)
BILIRUB SERPL-MCNC: 0.2 MG/DL (ref 0.1–1)
BUN SERPL-MCNC: 7 MG/DL (ref 6–20)
CALCIUM SERPL-MCNC: 8.1 MG/DL (ref 8.7–10.5)
CHLORIDE SERPL-SCNC: 103 MMOL/L (ref 95–110)
CO2 SERPL-SCNC: 24 MMOL/L (ref 23–29)
CREAT SERPL-MCNC: 0.5 MG/DL (ref 0.5–1.4)
DIFFERENTIAL METHOD: ABNORMAL
EOSINOPHIL # BLD AUTO: 0 K/UL (ref 0–0.5)
EOSINOPHIL NFR BLD: 0 % (ref 0–8)
ERYTHROCYTE [DISTWIDTH] IN BLOOD BY AUTOMATED COUNT: 14.6 % (ref 11.5–14.5)
EST. GFR  (AFRICAN AMERICAN): >60 ML/MIN/1.73 M^2
EST. GFR  (NON AFRICAN AMERICAN): >60 ML/MIN/1.73 M^2
GLUCOSE SERPL-MCNC: 126 MG/DL (ref 70–110)
HCT VFR BLD AUTO: 29.4 % (ref 40–54)
HGB BLD-MCNC: 9 G/DL (ref 14–18)
IMM GRANULOCYTES # BLD AUTO: 0.03 K/UL (ref 0–0.04)
IMM GRANULOCYTES NFR BLD AUTO: 0.2 % (ref 0–0.5)
LYMPHOCYTES # BLD AUTO: 1.6 K/UL (ref 1–4.8)
LYMPHOCYTES NFR BLD: 12.2 % (ref 18–48)
MAGNESIUM SERPL-MCNC: 1.8 MG/DL (ref 1.6–2.6)
MCH RBC QN AUTO: 29.7 PG (ref 27–31)
MCHC RBC AUTO-ENTMCNC: 30.6 G/DL (ref 32–36)
MCV RBC AUTO: 97 FL (ref 82–98)
MONOCYTES # BLD AUTO: 1.6 K/UL (ref 0.3–1)
MONOCYTES NFR BLD: 12.6 % (ref 4–15)
NEUTROPHILS # BLD AUTO: 9.6 K/UL (ref 1.8–7.7)
NEUTROPHILS NFR BLD: 74.8 % (ref 38–73)
NRBC BLD-RTO: 0 /100 WBC
PHOSPHATE SERPL-MCNC: 2.3 MG/DL (ref 2.7–4.5)
PLATELET # BLD AUTO: 667 K/UL (ref 150–350)
PMV BLD AUTO: 9.3 FL (ref 9.2–12.9)
POCT GLUCOSE: 160 MG/DL (ref 70–110)
POTASSIUM SERPL-SCNC: 3.9 MMOL/L (ref 3.5–5.1)
PROT SERPL-MCNC: 6.7 G/DL (ref 6–8.4)
RBC # BLD AUTO: 3.03 M/UL (ref 4.6–6.2)
SODIUM SERPL-SCNC: 136 MMOL/L (ref 136–145)
WBC # BLD AUTO: 12.87 K/UL (ref 3.9–12.7)

## 2020-09-30 PROCEDURE — 99900035 HC TECH TIME PER 15 MIN (STAT)

## 2020-09-30 PROCEDURE — 94668 MNPJ CHEST WALL SBSQ: CPT

## 2020-09-30 PROCEDURE — 99232 SBSQ HOSP IP/OBS MODERATE 35: CPT | Mod: ,,, | Performed by: INTERNAL MEDICINE

## 2020-09-30 PROCEDURE — 25000003 PHARM REV CODE 250: Performed by: INTERNAL MEDICINE

## 2020-09-30 PROCEDURE — 20600001 HC STEP DOWN PRIVATE ROOM

## 2020-09-30 PROCEDURE — 85025 COMPLETE CBC W/AUTO DIFF WBC: CPT

## 2020-09-30 PROCEDURE — 84100 ASSAY OF PHOSPHORUS: CPT

## 2020-09-30 PROCEDURE — 25000003 PHARM REV CODE 250: Performed by: HOSPITALIST

## 2020-09-30 PROCEDURE — 25500020 PHARM REV CODE 255: Performed by: INTERNAL MEDICINE

## 2020-09-30 PROCEDURE — 63600175 PHARM REV CODE 636 W HCPCS: Performed by: INTERNAL MEDICINE

## 2020-09-30 PROCEDURE — 99232 PR SUBSEQUENT HOSPITAL CARE,LEVL II: ICD-10-PCS | Mod: ,,, | Performed by: INTERNAL MEDICINE

## 2020-09-30 PROCEDURE — 83735 ASSAY OF MAGNESIUM: CPT

## 2020-09-30 PROCEDURE — 25500020 PHARM REV CODE 255: Performed by: SURGERY

## 2020-09-30 PROCEDURE — 80053 COMPREHEN METABOLIC PANEL: CPT

## 2020-09-30 PROCEDURE — 31720 CLEARANCE OF AIRWAYS: CPT

## 2020-09-30 PROCEDURE — 36415 COLL VENOUS BLD VENIPUNCTURE: CPT

## 2020-09-30 PROCEDURE — 63600175 PHARM REV CODE 636 W HCPCS: Performed by: HOSPITALIST

## 2020-09-30 PROCEDURE — 94761 N-INVAS EAR/PLS OXIMETRY MLT: CPT

## 2020-09-30 RX ORDER — VANCOMYCIN HCL IN 5 % DEXTROSE 1G/250ML
1000 PLASTIC BAG, INJECTION (ML) INTRAVENOUS
Status: DISCONTINUED | OUTPATIENT
Start: 2020-09-30 | End: 2020-10-02

## 2020-09-30 RX ADMIN — CEFEPIME 1 G: 1 INJECTION, POWDER, FOR SOLUTION INTRAMUSCULAR; INTRAVENOUS at 06:09

## 2020-09-30 RX ADMIN — METRONIDAZOLE 500 MG: 500 TABLET ORAL at 03:09

## 2020-09-30 RX ADMIN — CEFEPIME 1 G: 1 INJECTION, POWDER, FOR SOLUTION INTRAMUSCULAR; INTRAVENOUS at 10:09

## 2020-09-30 RX ADMIN — VANCOMYCIN HYDROCHLORIDE 1000 MG: 1 INJECTION, POWDER, LYOPHILIZED, FOR SOLUTION INTRAVENOUS at 09:09

## 2020-09-30 RX ADMIN — GABAPENTIN 250 MG: 250 SOLUTION ORAL at 08:09

## 2020-09-30 RX ADMIN — CEFEPIME 1 G: 1 INJECTION, POWDER, FOR SOLUTION INTRAMUSCULAR; INTRAVENOUS at 02:09

## 2020-09-30 RX ADMIN — Medication 1 CAPSULE: at 09:09

## 2020-09-30 RX ADMIN — IOHEXOL 15 ML: 350 INJECTION, SOLUTION INTRAVENOUS at 09:09

## 2020-09-30 RX ADMIN — PROPRANOLOL HYDROCHLORIDE 20 MG: 20 TABLET ORAL at 08:09

## 2020-09-30 RX ADMIN — LOPERAMIDE HYDROCHLORIDE 2 MG: 1 SOLUTION ORAL at 09:09

## 2020-09-30 RX ADMIN — PROPRANOLOL HYDROCHLORIDE 20 MG: 20 TABLET ORAL at 09:09

## 2020-09-30 RX ADMIN — LOPERAMIDE HYDROCHLORIDE 2 MG: 1 SOLUTION ORAL at 02:09

## 2020-09-30 RX ADMIN — METRONIDAZOLE 500 MG: 500 TABLET ORAL at 09:09

## 2020-09-30 RX ADMIN — METRONIDAZOLE 500 MG: 500 TABLET ORAL at 08:09

## 2020-09-30 RX ADMIN — Medication 10 ML: at 09:09

## 2020-09-30 RX ADMIN — LOPERAMIDE HYDROCHLORIDE 2 MG: 1 SOLUTION ORAL at 08:09

## 2020-09-30 RX ADMIN — VANCOMYCIN HYDROCHLORIDE 750 MG: 750 INJECTION, POWDER, LYOPHILIZED, FOR SOLUTION INTRAVENOUS at 09:09

## 2020-09-30 RX ADMIN — BACLOFEN 10 MG: 10 TABLET ORAL at 09:09

## 2020-09-30 RX ADMIN — IOHEXOL 75 ML: 350 INJECTION, SOLUTION INTRAVENOUS at 02:09

## 2020-09-30 RX ADMIN — PHENOBARBITAL 100 MG: 20 ELIXIR ORAL at 09:09

## 2020-09-30 RX ADMIN — PROPRANOLOL HYDROCHLORIDE 20 MG: 20 TABLET ORAL at 02:09

## 2020-09-30 RX ADMIN — BACLOFEN 10 MG: 10 TABLET ORAL at 08:09

## 2020-09-30 RX ADMIN — Medication 500 MG: at 09:09

## 2020-09-30 RX ADMIN — ACETAMINOPHEN 650 MG: 325 TABLET ORAL at 09:09

## 2020-09-30 RX ADMIN — BACLOFEN 10 MG: 10 TABLET ORAL at 02:09

## 2020-09-30 RX ADMIN — IOHEXOL 15 ML: 350 INJECTION, SOLUTION INTRAVENOUS at 10:09

## 2020-09-30 RX ADMIN — LOPERAMIDE HYDROCHLORIDE 2 MG: 1 SOLUTION ORAL at 06:09

## 2020-09-30 NOTE — PLAN OF CARE
VSS. Alert and nonverbal.  No s/s of pain.  Condom cath in place draining ana urine.  Tube feed infusing at 40 mL/hr which is goal.  Patient turned q2hr.  No acute events overnight. Safety maintained.  All questions answered. Patient updated on plan of care.  Will continue to monitor.

## 2020-09-30 NOTE — PLAN OF CARE
No changes in assessment during my care. VSS, afebrile. Wound care done as ordered. No S/S of pain or SOB. CT of abdomen completed, TF resumed per MD's okay. Pt's mother updated on plan of care by RN and MD today. NAD, continue to monitor.

## 2020-09-30 NOTE — PROGRESS NOTES
Pharmacokinetic Assessment Follow Up: IV Vancomycin    Vancomycin serum concentration assessment(s):    The trough level was drawn correctly and can be used to guide therapy at this time. The measurement is below the desired definitive target range of 15 to 20 mcg/mL.    Vancomycin Regimen Plan:    Given osteo, will treat as goal 15-20. Increase regimen to Vancomycin 1000 mg IV every 12 hours. No follow up trough ordered at this time. Next trough due 10/02 at 0900    Drug levels (last 3 results):  Recent Labs   Lab Result Units 09/28/20  0834   Vancomycin-Trough ug/mL 13.8       Pharmacy will continue to follow and monitor vancomycin.    Please contact pharmacy at extension [phone] for questions regarding this assessment.    Thank you for the consult,   Raymundo Chakraborty       Patient brief summary:  Glen Moscoso is a 24 y.o. male initiated on antimicrobial therapy with IV Vancomycin for treatment of  PNA cx, wound osteo, and GI perf      Drug Allergies:   Review of patient's allergies indicates:  No Known Allergies    Actual Body Weight:   33.1kg    Renal Function:   Estimated Creatinine Clearance: 108.5 mL/min (based on SCr of 0.5 mg/dL).,     Dialysis Method (if applicable):  N/A    CBC (last 72 hours):  Recent Labs   Lab Result Units 09/28/20 0235 09/29/20  0351 09/30/20  0417   WBC K/uL 10.26 10.94 12.87*   Hemoglobin g/dL 9.3* 9.2* 9.0*   Hematocrit % 30.4* 30.4* 29.4*   Platelets K/uL 631* 632* 667*   Gran% % 71.9 72.5 74.8*   Lymph% % 16.6* 16.7* 12.2*   Mono% % 11.0 10.1 12.6   Eosinophil% % 0.0 0.1 0.0   Basophil% % 0.2 0.2 0.2   Differential Method  Automated Automated Automated       Metabolic Panel (last 72 hours):  Recent Labs   Lab Result Units 09/28/20 0235 09/29/20  0350 09/30/20  0417   Sodium mmol/L 137 139 136   Potassium mmol/L 3.9 3.9 3.9   Chloride mmol/L 102 103 103   CO2 mmol/L 25 25 24   Glucose mg/dL 106 77 126*   BUN, Bld mg/dL 7 7 7   Creatinine mg/dL 0.4* 0.4* 0.5   Albumin g/dL 2.0* 2.2*  2.0*   Total Bilirubin mg/dL 0.1 0.2 0.2   Alkaline Phosphatase U/L 96 108 101   AST U/L 22 21 14   ALT U/L 20 19 14   Magnesium mg/dL 1.9 1.8 1.8   Phosphorus mg/dL 2.8 4.1 2.3*       Vancomycin Administrations:  vancomycin given in the last 96 hours                     vancomycin 750 mg in dextrose 5 % 250 mL IVPB (ready to mix system) (mg) 750 mg New Bag 09/23/20 1958     750 mg New Bag  0812     750 mg New Bag 09/22/20 2155     750 mg New Bag  0822     750 mg New Bag 09/21/20 2236     750 mg New Bag  0804    vancomycin in dextrose 5 % 1 gram/250 mL IVPB 1,000 mg (mg) 1,000 mg New Bag 09/20/20 2016                    Microbiologic Results:  Microbiology Results (last 7 days)     Procedure Component Value Units Date/Time    Blood culture [[id number]] Collected: 09/24/20 0533    Order Status: Completed Specimen: Blood Updated: 09/29/20 1012     Blood Culture, Routine No growth after 5 days.    Blood culture [[id number]] Collected: 09/24/20 0533    Order Status: Completed Specimen: Blood Updated: 09/29/20 1012     Blood Culture, Routine No growth after 5 days.    Blood culture x two cultures. Draw prior to antibiotics. [[id number]] Collected: 09/20/20 1853    Order Status: Completed Specimen: Blood from Peripheral, Hand, Right Updated: 09/25/20 2012     Blood Culture, Routine No growth after 5 days.    Narrative:      Aerobic and anaerobic    Blood culture x two cultures. Draw prior to antibiotics. [[id number]] Collected: 09/20/20 1854    Order Status: Completed Specimen: Blood from Peripheral, Hand, Right Updated: 09/25/20 2012     Blood Culture, Routine No growth after 5 days.    Narrative:      Aerobic and anaerobic

## 2020-09-30 NOTE — ASSESSMENT & PLAN NOTE
Pneumoperitoneum  Colitis  · Patient was afebrile with stable WBC with resolution of sepsis until patient had temp of 100.9 F and slight increase in WBC to 21,800 today, 9/30. Patient's abdominal exam overall is largely unchanged and lungs are clear but patient has known colon issue and concern due to his noncommunicating state making a good exam difficult is will plan to repeat CT scan of abdomen today to re-evaluate his abdomen again. Patient remains on broad spectrum antibiotics.   · LTAC and CM/SW working on placement for patient for continued medical care.   · General surgery consulted on admit for pneumoperitoneum seen on admit CT scan of abdomen and pelvis. General surgery felt likely contained leak and very small bowel perforation related to recent colitis. Due to patient's very poor functional level at baseline and malnutrition felt to be very poor surgical candidate and recommended conservative.  · ID consulted and recommended IV Vancomycin, Cefepime and Flagyl to treat both small perforation and osteomyelitis of pelvic area and to extend abx until 10/22. IR to place tunneled catheter on 9/28 for long term antibiotics as current midline cannot be extended that long in future and bedside PICC team unable to place bedside PICC due to severe arm contractures. IR to coordinate with Anesthesia on 9/28 to place tunneled line. Hold tube feedings at midnight on Sunday, 9/27 for tunneled line on 9/28.   · ID recommends repeat CT scan of abdomen to re-evaluate perforation in 2-3 weeks or sooner if any acute worsening.  · WBC improving with abx but still with fever so will need to monitor closely as with patient's poor baseline function/noncommunicative status clinical assessment of abdominal worsening more difficult to assess.   · Patient with no further diarrhea.

## 2020-09-30 NOTE — PROGRESS NOTES
Wound care follow up:     R and L ischial pressure injuries improving with increased granulation tissue. Nursing to continue care with orders in place. Wound care to continue to follow pt PRN [id number]    R ischium       L ischium

## 2020-09-30 NOTE — PLAN OF CARE
Wound care came by and noted improvement with granulation of ischial ulcers. CT scan of abdomen done and no longer shows pneumoperitoneum and mild colonic thickening that is improved. Diarrhea has greatly improved from previous admit and overall patient is doing better. I discussed this at length with patient's mother Leni Moscoso by phone this afternoon. Patient is n=medically ready for hospital discharge and CM/SW continues to work on an LTAC placement for patient. Mother is very anxious and wants her son home soon but understands right now it is in his best interests to go to an LTAC to get proper wound care, IV abx and medical oversight that he cannot get in a home setting. Mother prefers a facility close to where they live and hopefully one where she can visit.Mrs. Farrar states she will not be at hospital for next couple of days as she is having procedure on Friday to get her ureteral stent removed by Urology. She plans on trying to be back to hospital after her procedure on Friday if dominique is still here. I explained to his mother that we are trying to get him to a LTAC so hopefully can find an accepting facility by end of this week.       LALITO HUNTER MD  Attending Staff Physician   Department of Hospital Medicine, [facility]  Pager: [phone]  Spectralink: [phone]

## 2020-09-30 NOTE — SUBJECTIVE & OBJECTIVE
Interval History: Patient had low grade temperature of 100.9 F at 2300 last night but none since. This is first temp in past 5 days. Patent also with mild increase in WBC to 12,800 from 10,000 that patient has been at for past several days. There is no significant change in abdomen exam but hard to really gauge abdominal exam considering patient's non-communicative baseline. Patient does have known colitis and small cheryl of pneumoperitoneum on admit so will repeat CT scan to make sure there has not been any change or worsening as unable to really follow abdominal exams to note if any real change. Left message to talk to mother about updates on her phone as Mrs. Moscoso has not been around for past several days as recovering from recent procedure to treat kidney stones. FELISHA/ANGELO working on LTAC placement for patient and still no accepting facility at this time. Tunneled catheter line in place to right chest wall.       Review of Systems   Unable to perform ROS: Patient nonverbal     Objective:     Vital Signs (Most Recent):  Temp: 97.6 °F (36.4 °C) (09/30/20 1143)  Pulse: 96 (09/30/20 1143)  Resp: 18 (09/30/20 1143)  BP: 117/77 (09/30/20 1143)  SpO2: 95 % (09/30/20 1143) Vital Signs (24h Range):  Temp:  [97.6 °F (36.4 °C)-100.9 °F (38.3 °C)] 97.6 °F (36.4 °C)  Pulse:  [] 96  Resp:  [16-24] 18  SpO2:  [92 %-98 %] 95 %  BP: (114-128)/(65-81) 117/77     Weight: 33.1 kg (72 lb 15.6 oz)  Body mass index is 15.79 kg/m².    Intake/Output Summary (Last 24 hours) at 9/30/2020 1324  Last data filed at 9/30/2020 0400  Gross per 24 hour   Intake 250 ml   Output 800 ml   Net -550 ml      Physical Exam  Vitals signs and nursing note reviewed.   Constitutional:       General: He is not in acute distress.     Appearance: He is well-developed. He is cachectic. He is ill-appearing (Chronic).   Eyes:      Conjunctiva/sclera: Conjunctivae normal.   Neck:      Vascular: No JVD.   Cardiovascular:      Rate and Rhythm: Normal rate and  regular rhythm.      Heart sounds: Normal heart sounds. No murmur. No friction rub. No gallop.    Pulmonary:      Effort: Pulmonary effort is normal. No respiratory distress.      Breath sounds: Normal breath sounds. No wheezing.   Abdominal:      General: Abdomen is flat. Bowel sounds are normal. There is no distension.      Palpations: Abdomen is soft.      Tenderness: There is no abdominal tenderness. There is no guarding.      Comments: PEG tube in place   Skin:     Capillary Refill: Capillary refill takes less than 2 seconds.      Findings: No erythema or rash.   Psychiatric:         Speech: He is noncommunicative.         Behavior: Behavior is cooperative.         Significant Labs:   CBC:   Recent Labs   Lab 09/29/20  0351 09/30/20 0417   WBC 10.94 12.87*   HGB 9.2* 9.0*   HCT 30.4* 29.4*   * 667*     CMP:   Recent Labs   Lab 09/29/20  0350 09/30/20 0417    136   K 3.9 3.9    103   CO2 25 24   GLU 77 126*   BUN 7 7   CREATININE 0.4* 0.5   CALCIUM 8.9 8.1*   PROT 6.7 6.7   ALBUMIN 2.2* 2.0*   BILITOT 0.2 0.2   ALKPHOS 108 101   AST 21 14   ALT 19 14   ANIONGAP 11 9   EGFRNONAA >60.0 >60.0     Magnesium:   Recent Labs   Lab 09/29/20  0350 09/30/20 0417   MG 1.8 1.8     Phosphorus 2.3    Significant Imaging: I have reviewed all pertinent imaging results/findings within the past 24 hours.

## 2020-09-30 NOTE — PROGRESS NOTES
[facility] Hospital Medicine  Progress Note    Patient Name: Glen Moscoso  MRN: [id number]  Patient Class: IP- Inpatient   Admission Date: 9/20/2020  Length of Stay: 10 days  Attending Physician: Nohemi Farris MD  Primary Care Provider: Yadi Lawson MD    Hospital Medicine Team: [facility] HOSP MED K Nohemi Farris MD    Subjective:     Principal Problem:Sepsis        HPI:  Mr. Glen Moscoso is a 24 y.o. male with CP, complicated by seizures and a peg tube, who presents to the ER for evaluation of fever.  He just had a prolonged and complex hospital stay at [facility] from 8/26-9/18 for seizures and pneumonia.  He was originally admitted to [facility], where he was found to have Pseudomonas and Serratia pneumonia, as well as a decubitus ulcer that was debrided by Gen Surg on 9/7 - cultures were not sent.  He was discharged home 9/18 on Zosyn via LUE PICC until 9/24. Family at bedside reports he was doing well until the day of admission when he started to have fevers up to 101.2F. They deny any new respiratory complaints.  Given his recent hospitalization and fever while on antibiotics, they brought him to the ER for further evaluation.     Upon arrival to the ER, vitals were temp 101.2F, , RR 24 and /75.  Labs showed WBC 17 with ESR 90 and Procal 0.56.  CXR didn't show a new consolidation.  His case was discussed with ID, who suggested Vanc, Cefepime, and Flagyl based on his previous cultures, as well as CT chest/abdomen/pelvis to include sacral wounds. He was admitted to Hospital Medicine for further management.    Overview/Hospital Course:  CT scan of abdomen and pelvis done on admit showed overnight small amount of free air and general surgery consulted and evaluated patient who recommended conservative management with antibiotics and close monitoring as felt high surgical risk and likely contained small colonic perforation. ID consulted and patient placed on IV Cefepime/Flagyl and  Vancomycin. G tube study done and no air leak noted. Patient on long term abx as outpatient for infected bilateral Stage IV ischial ulcers and recent bout of Serratia and Pseudomonal pneumonia. Antibiotics and wound care for ulcers continued on this admit. Patient with severe cerebral palsy and bed bound/wheelchair bound at baseline. Blood cultures from admit are no growth to date. Patient with no further fever since admit on 9/24 and WBC improving. Plan to continue broad spectrum abx and conservative care for small colonic perforation. Patient back on home TF and tolerating. ID recommending to extend current antibiotic regimen to treat bowel perforation and pelvic osteomyelitis until 10/22. Patient only with midline and needs PICC line for longer term antibiotic treatment. PICC line team consulted but stated due to patient's severe arm contractures PICC cannot be placed. PICC team recommended to consult IR for tunneled catheter amd IIR consulted on 9/25 and plan to place line on 9/28 with assistance of anesthesia. IR states to hold tube feedings at midnight on Sunday for line placement on 9/28. Patient had tunneled line placed by IR to right chest wall on 9/28. Patient had issues after line placed with secretions and became hypoxic but aggressively suctioned and improved and patient much improved and back off oxygen on 9/29. Patient is now medically ready to discharge to an LTAC but / states still no accepting facility at this time and thus also no insurance authorization. Patient continues to tolerate TF and patient not having any diarrhea which is an improvement from previous hospital stay.     Interval History: Patient had low grade temperature of 100.9 F at 2300 last night but none since. This is first temp in past 5 days. Patent also with mild increase in WBC to 12,800 from 10,000 that patient has been at for past several days. There is no significant change in abdomen exam but hard to really gauge  abdominal exam considering patient's non-communicative baseline. Patient does have known colitis and small cheryl of pneumoperitoneum on admit so will repeat CT scan to make sure there has not been any change or worsening as unable to really follow abdominal exams to note if any real change. Left message to talk to mother about updates on her phone as Mrs. Moscoso has not been around for past several days as recovering from recent procedure to treat kidney stones. FELISHA/ANGELO working on LTAC placement for patient and still no accepting facility at this time. Tunneled catheter line in place to right chest wall.       Review of Systems   Unable to perform ROS: Patient nonverbal     Objective:     Vital Signs (Most Recent):  Temp: 97.6 °F (36.4 °C) (09/30/20 1143)  Pulse: 96 (09/30/20 1143)  Resp: 18 (09/30/20 1143)  BP: 117/77 (09/30/20 1143)  SpO2: 95 % (09/30/20 1143) Vital Signs (24h Range):  Temp:  [97.6 °F (36.4 °C)-100.9 °F (38.3 °C)] 97.6 °F (36.4 °C)  Pulse:  [] 96  Resp:  [16-24] 18  SpO2:  [92 %-98 %] 95 %  BP: (114-128)/(65-81) 117/77     Weight: 33.1 kg (72 lb 15.6 oz)  Body mass index is 15.79 kg/m².    Intake/Output Summary (Last 24 hours) at 9/30/2020 1324  Last data filed at 9/30/2020 0400  Gross per 24 hour   Intake 250 ml   Output 800 ml   Net -550 ml      Physical Exam  Vitals signs and nursing note reviewed.   Constitutional:       General: He is not in acute distress.     Appearance: He is well-developed. He is cachectic. He is ill-appearing (Chronic).   Eyes:      Conjunctiva/sclera: Conjunctivae normal.   Neck:      Vascular: No JVD.   Cardiovascular:      Rate and Rhythm: Normal rate and regular rhythm.      Heart sounds: Normal heart sounds. No murmur. No friction rub. No gallop.    Pulmonary:      Effort: Pulmonary effort is normal. No respiratory distress.      Breath sounds: Normal breath sounds. No wheezing.   Abdominal:      General: Abdomen is flat. Bowel sounds are normal. There is no  distension.      Palpations: Abdomen is soft.      Tenderness: There is no abdominal tenderness. There is no guarding.      Comments: PEG tube in place   Skin:     Capillary Refill: Capillary refill takes less than 2 seconds.      Findings: No erythema or rash.   Psychiatric:         Speech: He is noncommunicative.         Behavior: Behavior is cooperative.         Significant Labs:   CBC:   Recent Labs   Lab 09/29/20  0351 09/30/20  0417   WBC 10.94 12.87*   HGB 9.2* 9.0*   HCT 30.4* 29.4*   * 667*     CMP:   Recent Labs   Lab 09/29/20  0350 09/30/20  0417    136   K 3.9 3.9    103   CO2 25 24   GLU 77 126*   BUN 7 7   CREATININE 0.4* 0.5   CALCIUM 8.9 8.1*   PROT 6.7 6.7   ALBUMIN 2.2* 2.0*   BILITOT 0.2 0.2   ALKPHOS 108 101   AST 21 14   ALT 19 14   ANIONGAP 11 9   EGFRNONAA >60.0 >60.0     Magnesium:   Recent Labs   Lab 09/29/20  0350 09/30/20 0417   MG 1.8 1.8     Phosphorus 2.3    Significant Imaging: I have reviewed all pertinent imaging results/findings within the past 24 hours.      Assessment/Plan:      * Sepsis  Pneumoperitoneum  Colitis  · Patient was afebrile with stable WBC with resolution of sepsis until patient had temp of 100.9 F and slight increase in WBC to 21,800 today, 9/30. Patient's abdominal exam overall is largely unchanged and lungs are clear but patient has known colon issue and concern due to his noncommunicating state making a good exam difficult is will plan to repeat CT scan of abdomen today to re-evaluate his abdomen again. Patient remains on broad spectrum antibiotics.   · LTAC and CM/SW working on placement for patient for continued medical care.   · General surgery consulted on admit for pneumoperitoneum seen on admit CT scan of abdomen and pelvis. General surgery felt likely contained leak and very small bowel perforation related to recent colitis. Due to patient's very poor functional level at baseline and malnutrition felt to be very poor surgical candidate  "and recommended conservative.  · ID consulted and recommended IV Vancomycin, Cefepime and Flagyl to treat both small perforation and osteomyelitis of pelvic area and to extend abx until 10/22. IR to place tunneled catheter on  for long term antibiotics as current midline cannot be extended that long in future and bedside PICC team unable to place bedside PICC due to severe arm contractures. IR to coordinate with Anesthesia on  to place tunneled line. Hold tube feedings at midnight on ,  for tunneled line on .   · ID recommends repeat CT scan of abdomen to re-evaluate perforation in 2-3 weeks or sooner if any acute worsening.  · WBC improving with abx but still with fever so will need to monitor closely as with patient's poor baseline function/noncommunicative status clinical assessment of abdominal worsening more difficult to assess.   · Patient with no further diarrhea.       Decubitus ulcer of ischial area, left, stage IV  Pressure injury of right ischium, stage 4  Osteomyelitis of pelvis  · Controlled. Wounds are improving. Continue current wound care.  · Wound care consulted and managing and wound care and preventive measures as per wound care recommendations. Wounds appear clean with no definite signs of infection.   · As per wound care "Nursing to cleanse scrotal skin breakdown and R and L ischial pressure injuries with wound cleanser. Pack ischial pressure injuries with Triad ointment and gauze, then cover bordered foam dressing to prove autolytic debridement and prevent breakdown of intact skin. Apply Triad ointment over scrotal skin breakdown."  · Patient now with exposed bone to ischial areas and ID with concern for underlying osteomyelitis even though wounds themselves do not appear infected so recommend extension of long term abx with IV Vancomycin, Cefepime and po Flagyl until at least 10/22. Will need PICC line as midline about ready to  as per ID recs and IR placed tunneled " line on 9/28 for long term antibiotics to right chest wall.     Severe protein-calorie malnutrition  · Present on admit and related to colitis, recent medical illnesses. Patient with PEG and receiving TF with Prebio to help with nutrition. Likely cause of poor wound healing and development of ischial ulcers.   · Nutrition consulted and following and continue Peptamen with Prebio at 40 mL/hr as per nutrition recs to treat malnutrition.       Seizure disorder  Chronic and controlled and continue Phenobarbital to treat.       Anemia of chronic disease  Chronic and controlled and related to chronic infection, poor nutrition and poor baseline function status. Monitor anemia with daily CBC and transfuse if Hgb < 7.       Hypophosphatemia  · Slight decrease on 9/30 to 2.3.   · Resolved on 9/29 and level 4.1.   · Improved level at 3.0 on 9/26 and stable at 2.4 on 9/27 and 2.8 on 9/28.   · Phosphorus level 1.5 on 9/25 consistent with hypophosphatemia and treated with Sodium Phosphate 20 mmol IV for 1 dose(s).  · Monitor daily Phosphorus level and replace prn.     Cerebral palsy  Chronic condition. Patient with poor baseline functional status and non-communicative and bed bound/wheelchair bound with extremity contractures to all extremities at baseline.         VTE Risk Mitigation (From admission, onward)         Ordered     IP VTE HIGH RISK PATIENT  Once      09/28/20 1211     Place sequential compression device  Until discontinued      09/28/20 1211                Discharge Planning   ROCÍO: 9/30/2020     Code Status: Full Code   Is the patient medically ready for discharge?: Yes    Reason for patient still in hospital (select all that apply): Patient trending condition; Awaiting LTAC placement. Patient has no accepting facility. CT scan of abdomen today to re-evaluate abdomen to sure okay prior to consideration for hospital discharge.   Discharge Plan A: Long-term acute care facility (LTAC)   Discharge Delays: None known at  this time              Nohemi Farris MD  Department of Hospital Medicine   [facility]

## 2020-09-30 NOTE — ASSESSMENT & PLAN NOTE
· Slight decrease on 9/30 to 2.3.   · Resolved on 9/29 and level 4.1.   · Improved level at 3.0 on 9/26 and stable at 2.4 on 9/27 and 2.8 on 9/28.   · Phosphorus level 1.5 on 9/25 consistent with hypophosphatemia and treated with Sodium Phosphate 20 mmol IV for 1 dose(s).  · Monitor daily Phosphorus level and replace prn.

## 2020-09-30 NOTE — PLAN OF CARE
09/30/20 1040   Post-Acute Status   Post-Acute Authorization Placement   Post-Acute Placement Status Referrals Sent   Discharge Delays None known at this time   Discharge Plan   Discharge Plan A Long-term acute care facility (LTAC)   SW spoke with Jeri at [facility] & was told that she was looking at updates and will try to accommodate pt needs but she is not sure which facility would have a bed.  SW explained that pt lives in [facility] would be easier for pt's mom to visit.  Mother understands that pt needs LTAC and is willing to work with facility on location.  SW will continue to F/U.      Cintia Guo, FELISHA  Ext [phone]

## 2020-09-30 NOTE — ASSESSMENT & PLAN NOTE
"Pressure injury of right ischium, stage 4  Osteomyelitis of pelvis  · Controlled. Wounds are improving. Continue current wound care.  · Wound care consulted and managing and wound care and preventive measures as per wound care recommendations. Wounds appear clean with no definite signs of infection.   · As per wound care "Nursing to cleanse scrotal skin breakdown and R and L ischial pressure injuries with wound cleanser. Pack ischial pressure injuries with Triad ointment and gauze, then cover bordered foam dressing to prove autolytic debridement and prevent breakdown of intact skin. Apply Triad ointment over scrotal skin breakdown."  · Patient now with exposed bone to ischial areas and ID with concern for underlying osteomyelitis even though wounds themselves do not appear infected so recommend extension of long term abx with IV Vancomycin, Cefepime and po Flagyl until at least 10/22. Will need PICC line as midline about ready to  as per ID recs and IR placed tunneled line on  for long term antibiotics to right chest wall.   "

## 2020-10-01 LAB
ALBUMIN SERPL BCP-MCNC: 2.3 G/DL (ref 3.5–5.2)
ALP SERPL-CCNC: 107 U/L (ref 55–135)
ALT SERPL W/O P-5'-P-CCNC: 12 U/L (ref 10–44)
ANION GAP SERPL CALC-SCNC: 9 MMOL/L (ref 8–16)
AST SERPL-CCNC: 16 U/L (ref 10–40)
BASOPHILS # BLD AUTO: 0.02 K/UL (ref 0–0.2)
BASOPHILS NFR BLD: 0.2 % (ref 0–1.9)
BILIRUB SERPL-MCNC: 0.1 MG/DL (ref 0.1–1)
BUN SERPL-MCNC: 8 MG/DL (ref 6–20)
CALCIUM SERPL-MCNC: 8.9 MG/DL (ref 8.7–10.5)
CHLORIDE SERPL-SCNC: 102 MMOL/L (ref 95–110)
CO2 SERPL-SCNC: 25 MMOL/L (ref 23–29)
CREAT SERPL-MCNC: 0.5 MG/DL (ref 0.5–1.4)
DIFFERENTIAL METHOD: ABNORMAL
EOSINOPHIL # BLD AUTO: 0 K/UL (ref 0–0.5)
EOSINOPHIL NFR BLD: 0 % (ref 0–8)
ERYTHROCYTE [DISTWIDTH] IN BLOOD BY AUTOMATED COUNT: 14.6 % (ref 11.5–14.5)
EST. GFR  (AFRICAN AMERICAN): >60 ML/MIN/1.73 M^2
EST. GFR  (NON AFRICAN AMERICAN): >60 ML/MIN/1.73 M^2
GLUCOSE SERPL-MCNC: 132 MG/DL (ref 70–110)
HCT VFR BLD AUTO: 31.2 % (ref 40–54)
HGB BLD-MCNC: 9.6 G/DL (ref 14–18)
IMM GRANULOCYTES # BLD AUTO: 0.03 K/UL (ref 0–0.04)
IMM GRANULOCYTES NFR BLD AUTO: 0.3 % (ref 0–0.5)
LYMPHOCYTES # BLD AUTO: 1.4 K/UL (ref 1–4.8)
LYMPHOCYTES NFR BLD: 12.6 % (ref 18–48)
MAGNESIUM SERPL-MCNC: 1.9 MG/DL (ref 1.6–2.6)
MCH RBC QN AUTO: 29.8 PG (ref 27–31)
MCHC RBC AUTO-ENTMCNC: 30.8 G/DL (ref 32–36)
MCV RBC AUTO: 97 FL (ref 82–98)
MONOCYTES # BLD AUTO: 1.3 K/UL (ref 0.3–1)
MONOCYTES NFR BLD: 12 % (ref 4–15)
NEUTROPHILS # BLD AUTO: 8.2 K/UL (ref 1.8–7.7)
NEUTROPHILS NFR BLD: 74.9 % (ref 38–73)
NRBC BLD-RTO: 0 /100 WBC
PHOSPHATE SERPL-MCNC: 3 MG/DL (ref 2.7–4.5)
PLATELET # BLD AUTO: 669 K/UL (ref 150–350)
PMV BLD AUTO: 9.1 FL (ref 9.2–12.9)
POTASSIUM SERPL-SCNC: 3.9 MMOL/L (ref 3.5–5.1)
PROT SERPL-MCNC: 7.2 G/DL (ref 6–8.4)
RBC # BLD AUTO: 3.22 M/UL (ref 4.6–6.2)
SODIUM SERPL-SCNC: 136 MMOL/L (ref 136–145)
WBC # BLD AUTO: 10.91 K/UL (ref 3.9–12.7)

## 2020-10-01 PROCEDURE — 99900026 HC AIRWAY MAINTENANCE (STAT)

## 2020-10-01 PROCEDURE — 84100 ASSAY OF PHOSPHORUS: CPT

## 2020-10-01 PROCEDURE — 20600001 HC STEP DOWN PRIVATE ROOM

## 2020-10-01 PROCEDURE — 94761 N-INVAS EAR/PLS OXIMETRY MLT: CPT

## 2020-10-01 PROCEDURE — 94668 MNPJ CHEST WALL SBSQ: CPT

## 2020-10-01 PROCEDURE — 99232 PR SUBSEQUENT HOSPITAL CARE,LEVL II: ICD-10-PCS | Mod: ,,, | Performed by: HOSPITALIST

## 2020-10-01 PROCEDURE — 99232 SBSQ HOSP IP/OBS MODERATE 35: CPT | Mod: ,,, | Performed by: HOSPITALIST

## 2020-10-01 PROCEDURE — 83735 ASSAY OF MAGNESIUM: CPT

## 2020-10-01 PROCEDURE — 99900035 HC TECH TIME PER 15 MIN (STAT)

## 2020-10-01 PROCEDURE — 36415 COLL VENOUS BLD VENIPUNCTURE: CPT

## 2020-10-01 PROCEDURE — 80053 COMPREHEN METABOLIC PANEL: CPT

## 2020-10-01 PROCEDURE — 25000003 PHARM REV CODE 250: Performed by: INTERNAL MEDICINE

## 2020-10-01 PROCEDURE — 25000003 PHARM REV CODE 250: Performed by: HOSPITALIST

## 2020-10-01 PROCEDURE — 63600175 PHARM REV CODE 636 W HCPCS: Performed by: INTERNAL MEDICINE

## 2020-10-01 PROCEDURE — 85025 COMPLETE CBC W/AUTO DIFF WBC: CPT

## 2020-10-01 RX ORDER — LOPERAMIDE HCL 1MG/7.5ML
2 LIQUID (ML) ORAL 4 TIMES DAILY
Refills: 0
Start: 2020-10-01 | End: 2020-10-11

## 2020-10-01 RX ORDER — BISACODYL 10 MG
10 SUPPOSITORY, RECTAL RECTAL DAILY PRN
Refills: 0 | COMMUNITY
Start: 2020-10-01

## 2020-10-01 RX ORDER — BACLOFEN 10 MG/1
10 TABLET ORAL 3 TIMES DAILY
Qty: 90 TABLET | Refills: 11
Start: 2020-10-01 | End: 2021-10-01

## 2020-10-01 RX ORDER — PROPRANOLOL HYDROCHLORIDE 20 MG/1
20 TABLET ORAL 3 TIMES DAILY
Qty: 90 TABLET | Refills: 11 | Status: ON HOLD
Start: 2020-10-01 | End: 2020-12-22 | Stop reason: HOSPADM

## 2020-10-01 RX ORDER — CEFEPIME HYDROCHLORIDE 1 G/1
1 INJECTION, POWDER, FOR SOLUTION INTRAMUSCULAR; INTRAVENOUS EVERY 8 HOURS
Qty: 63 G | Refills: 0
Start: 2020-10-01 | End: 2020-10-09 | Stop reason: HOSPADM

## 2020-10-01 RX ORDER — VANCOMYCIN HCL IN 5 % DEXTROSE 1G/250ML
1000 PLASTIC BAG, INJECTION (ML) INTRAVENOUS EVERY 12 HOURS
Start: 2020-10-01 | End: 2020-10-09 | Stop reason: HOSPADM

## 2020-10-01 RX ORDER — PHENOBARBITAL 20 MG/5ML
100 ELIXIR ORAL NIGHTLY
Qty: 473 ML | Refills: 0 | Status: SHIPPED | OUTPATIENT
Start: 2020-10-01 | End: 2021-04-01

## 2020-10-01 RX ORDER — TALC
6 POWDER (GRAM) TOPICAL NIGHTLY PRN
Refills: 0 | COMMUNITY
Start: 2020-10-01

## 2020-10-01 RX ORDER — ACETAMINOPHEN 325 MG/1
650 TABLET ORAL EVERY 4 HOURS PRN
Refills: 0 | Status: ON HOLD
Start: 2020-10-01 | End: 2020-10-22 | Stop reason: ALTCHOICE

## 2020-10-01 RX ORDER — IPRATROPIUM BROMIDE AND ALBUTEROL SULFATE 2.5; .5 MG/3ML; MG/3ML
3 SOLUTION RESPIRATORY (INHALATION) EVERY 4 HOURS PRN
Qty: 1 BOX | Refills: 0 | Status: ON HOLD
Start: 2020-10-01 | End: 2020-10-22 | Stop reason: ALTCHOICE

## 2020-10-01 RX ORDER — METRONIDAZOLE 500 MG/1
500 TABLET ORAL 3 TIMES DAILY
Qty: 63 TABLET | Refills: 0
Start: 2020-10-01 | End: 2020-10-09

## 2020-10-01 RX ADMIN — CEFEPIME 1 G: 1 INJECTION, POWDER, FOR SOLUTION INTRAMUSCULAR; INTRAVENOUS at 05:10

## 2020-10-01 RX ADMIN — ACETAMINOPHEN 650 MG: 325 TABLET ORAL at 09:10

## 2020-10-01 RX ADMIN — Medication 1 CAPSULE: at 09:10

## 2020-10-01 RX ADMIN — Medication 500 MG: at 09:10

## 2020-10-01 RX ADMIN — LOPERAMIDE HYDROCHLORIDE 2 MG: 1 SOLUTION ORAL at 09:10

## 2020-10-01 RX ADMIN — BACLOFEN 10 MG: 10 TABLET ORAL at 09:10

## 2020-10-01 RX ADMIN — PROPRANOLOL HYDROCHLORIDE 20 MG: 20 TABLET ORAL at 09:10

## 2020-10-01 RX ADMIN — GABAPENTIN 250 MG: 250 SOLUTION ORAL at 09:10

## 2020-10-01 RX ADMIN — METRONIDAZOLE 500 MG: 500 TABLET ORAL at 02:10

## 2020-10-01 RX ADMIN — LOPERAMIDE HYDROCHLORIDE 2 MG: 1 SOLUTION ORAL at 05:10

## 2020-10-01 RX ADMIN — METRONIDAZOLE 500 MG: 500 TABLET ORAL at 09:10

## 2020-10-01 RX ADMIN — PROPRANOLOL HYDROCHLORIDE 20 MG: 20 TABLET ORAL at 02:10

## 2020-10-01 RX ADMIN — CEFEPIME 1 G: 1 INJECTION, POWDER, FOR SOLUTION INTRAMUSCULAR; INTRAVENOUS at 02:10

## 2020-10-01 RX ADMIN — VANCOMYCIN HYDROCHLORIDE 1000 MG: 1 INJECTION, POWDER, LYOPHILIZED, FOR SOLUTION INTRAVENOUS at 09:10

## 2020-10-01 RX ADMIN — CEFEPIME 1 G: 1 INJECTION, POWDER, FOR SOLUTION INTRAMUSCULAR; INTRAVENOUS at 10:10

## 2020-10-01 RX ADMIN — LOPERAMIDE HYDROCHLORIDE 2 MG: 1 SOLUTION ORAL at 02:10

## 2020-10-01 RX ADMIN — BACLOFEN 10 MG: 10 TABLET ORAL at 02:10

## 2020-10-01 RX ADMIN — Medication 10 ML: at 09:10

## 2020-10-01 RX ADMIN — PHENOBARBITAL 100 MG: 20 ELIXIR ORAL at 10:10

## 2020-10-01 NOTE — PROGRESS NOTES
"  [facility]  Adult Nutrition  Consult Note    SUMMARY     Recommendations    1. Continue current TF regimen of Peptamen 1.5 Prebio @ 40 mL/hr - meeting needs.    - If bolus TFs desired for discharge, rec'd Peptamen 1.5 Prebio - 4 cans/day to provide 1500 kcals, 68 g of protein, 772 mL fluid.  2. RD to monitor & follow-up.    Goals: Meet % EEN, EPN by RD f/u date  Nutrition Goal Status: goal met  Communication of RD Recs: reviewed with RN    Reason for Assessment    Reason For Assessment: RD follow-up  Diagnosis: other (see comments)(Sepsis)  Relevant Medical History: CP, PEG  Interdisciplinary Rounds: did not attend    General Information Comments: Pt remains NPO, tolerating enteral nutrition via PEG at goal. NFPE complete ; pt meets criteria for moderate acute malnutrition. Please see PES statement for details. UBW ~ 85 kg per chart review. Wounds noted.  Nutrition Discharge Planning: Tolerance of TF    Nutrition/Diet History    Patient Reported Diet/Restrictions/Preferences: no oral intake  Spiritual, Cultural Beliefs, Evangelical Practices, Values that Affect Care: no  Factors Affecting Nutritional Intake: NPO    Anthropometrics    Temp: 98.7 °F (37.1 °C)  Height Method: Stated  Height: 4' 9" (144.8 cm)  Height (inches): 57 in  Weight Method: Stated  Weight: 33.1 kg (72 lb 15.6 oz)  Weight (lb): 72.97 lb  Ideal Body Weight (IBW), Male: 88 lb  % Ideal Body Weight, Male (lb): 82.92 %  BMI (Calculated): 15.8  BMI Grade: less than 16 protein-energy malnutrition grade III  Usual Body Weight (UBW), k.6 kg  % Usual Body Weight: 85.93  % Weight Change From Usual Weight: -14.25 %    Lab/Procedures/Meds    Pertinent Labs Reviewed: reviewed  Pertinent Labs Comments: -  Pertinent Medications Reviewed: reviewed  Pertinent Medications Comments: -    Estimated/Assessed Needs    Weight Used For Calorie Calculations: 33.1 kg (72 lb 15.6 oz)     Energy Calorie Requirements (kcal): 1413 " kcal/d  Energy Need Method: Atkinson-St Shellie(1.25 PAL)     Protein Requirements: 50-66 g/d (1.5-2 g/kg)  Weight Used For Protein Calculations: 33.1 kg (72 lb 15.6 oz)     Estimated Fluid Requirement Method: other (see comments)(Per MD or 1 mL/kcal)  RDA Method (mL): 1413    Nutrition Prescription Ordered    Current Diet Order: NPO  Current Nutrition Support Formula Ordered: Peptamen 1.5 w/Prebio  Current Nutrition Support Rate Ordered: 40 mL/hr    Evaluation of Received Nutrient/Fluid Intake    Enteral Calories (kcal): 1440  Enteral Protein (gm): 65  Enteral (Free Water) Fluid (mL): 739    % Kcal Needs: 102%  % Protein Needs: 100%    Energy Calories Required: meeting needs  Protein Required: meeting needs  Fluid Required: other (see comments)(Per MD or 1 mL/kcal)    Comments: LBM: 10/1    Tolerance: tolerating    Nutrition Risk    Level of Risk/Frequency of Follow-up: (1x/week)     Assessment and Plan    Moderate malnutrition    Nutrition Problem:  (Moderate/Severe) Protein-Calorie Malnutrition  Malnutrition in the context of Acute Illness/Injury    Related to (etiology):  Inability to consume sufficient energy    Signs and Symptoms (as evidenced by):  Body Fat Depletion: moderate depletion of orbitals, triceps and thoracic and lumbar region   Muscle Mass Depletion: moderate depletion of temples, clavicle region, scapular region and interosseous muscle   Weight Loss: 14% x 9 months    Interventions(treatment strategy):  Collaboration of nutrition care w/ other providers     Nutrition Diagnosis Status:  Continues     Monitor and Evaluation    Food and Nutrient Intake: enteral nutrition intake  Food and Nutrient Adminstration: enteral and parenteral nutrition administration  Physical Activity and Function: nutrition-related ADLs and IADLs  Anthropometric Measurements: weight, weight change  Biochemical Data, Medical Tests and Procedures: inflammatory profile, lipid profile, glucose/endocrine profile, gastrointestinal  profile, electrolyte and renal panel  Nutrition-Focused Physical Findings: overall appearance     Malnutrition Assessment    Weight Loss (Malnutrition): greater than 10% in 6 months  Energy Intake (Malnutrition): other (see comments)(NICOLA)  Subcutaneous Fat (Malnutrition): moderate depletion  Muscle Mass (Malnutrition): moderate depletion   Orbital Region (Subcutaneous Fat Loss): moderate depletion  Upper Arm Region (Subcutaneous Fat Loss): moderate depletion  Thoracic and Lumbar Region: moderate depletion   Quaker Region (Muscle Loss): moderate depletion  Clavicle Bone Region (Muscle Loss): moderate depletion  Dorsal Hand (Muscle Loss): moderate depletion  Anterior Thigh Region (Muscle Loss): severe depletion  Posterior Calf Region (Muscle Loss): severe depletion     Nutrition Follow-Up    RD Follow-up?: Yes

## 2020-10-01 NOTE — PROGRESS NOTES
Progress Note   Hospital Medicine         Patient Name: Glen Moscoso  MRN:  [id number]  [facility] Medicine Team: [facility] Quiana Armenta MD  Date of Admission:  9/20/2020     Length of Stay:  LOS: 11 days   Expected Discharge Date: 10/2/2020  Principal Problem:  Sepsis       Subjective:     Interval History/Overnight Events:  Patient more active on exam than other days svitlana had him, moving his arms more, looking at me it seems more when I talk, is nonverbal baseline but seems more active when I talk to him and mention his mom. I called him to udpate her on these things, she said he seems more like himself when she visisted yesterday also. Mild tachy at times but improves. On room air. Wbc 12 to 10. Hg stable. Bp stable.   On tube feeds 40 cc/hr. And tolerating well. No signs of pain on exam. CT has improved, no signs of pneumonia and pneumoperitoneum has resolved. Wound care following for needs, tolerated chest wall line placement on right well, covered now in tegrederm and clean for IV antibiotics. Awaiting lTAC, mom to come visit tomorrow after her MD appointment.     Review of Systems   Unable to assess secondary to condition.    Objective:     Temp:  [97.7 °F (36.5 °C)-99.6 °F (37.6 °C)]   Pulse:  []   Resp:  [16-20]   BP: (123-142)/(72-91)   SpO2:  [90 %-100 %]       Physical Exam:  Constitutional: Appears chronic ill appearing. More active during exam.   Head: Normocephalic and atraumatic.   Mouth/Throat: Oropharynx is clear and moist.   Eyes: EOM are normal. Pupils are equal, round, and reactive to light. No scleral icterus.   Neck: Normal range of motion. Neck supple.   Cardiovascular: Normal rate and regular rhythm.  No murmur heard.  Pulmonary/Chest: Effort normal and breath sounds normal. Right chest all permacath, covered in tegrederm. No respiratory distress. No wheezes, rales, or rhonchi  Abdominal: Soft. Bowel sounds are normal.  No distension or tenderness. PEG in place.  Stable.  Musculoskeletal: contractures in arms, legs.   Neurological: non communicative, baseline.  Skin: Skin is warm and dry.   Psychiatric: at baseline, noncommunicative. More active and alert when talk to him     Recent Labs   Lab 09/26/20 0352 09/27/20  0425 09/28/20  0235 09/29/20  0351 09/30/20  0417 10/01/20  0339   WBC 10.79 10.36 10.26 10.94 12.87* 10.91   HGB 9.8* 10.9* 9.3* 9.2* 9.0* 9.6*   HCT 32.1* 36.2* 30.4* 30.4* 29.4* 31.2*   * 655* 631* 632* 667* 669*     Recent Labs   Lab 09/29/20  0350 09/30/20  0417 10/01/20  0339    136 136   K 3.9 3.9 3.9    103 102   CO2 25 24 25   BUN 7 7 8   CREATININE 0.4* 0.5 0.5   GLU 77 126* 132*   CALCIUM 8.9 8.1* 8.9   MG 1.8 1.8 1.9   PHOS 4.1 2.3* 3.0     Recent Labs   Lab 09/29/20  0350 09/30/20  0417 10/01/20  0339   ALKPHOS 108 101 107   ALT 19 14 12   AST 21 14 16   ALBUMIN 2.2* 2.0* 2.3*   PROT 6.7 6.7 7.2   BILITOT 0.2 0.2 0.1     Recent Labs   Lab 09/30/20  1104   POCTGLUCOSE 160*        ascorbic acid (vitamin C)  500 mg Per G Tube Daily    baclofen  10 mg Per G Tube TID    ceFEPime (MAXIPIME) IVPB  1 g Intravenous Q8H    gabapentin  250 mg Per G Tube QHS    Lactobacillus rhamnosus GG  1 capsule Per G Tube Daily    loperamide  2 mg Per G Tube QID    metroNIDAZOLE  500 mg Per G Tube TID    multivitamin liquid no.118  10 mL Per G Tube Daily    PHENobarbitaL  100 mg Per G Tube QHS    propranoloL  20 mg Per G Tube TID    vancomycin (VANCOCIN) IVPB  1,000 mg Intravenous Q12H       Assessment and Plan     Mr. Glen Moscoso is a 24 y.o. male who presented to [facility] on 9/20/2020 with     Sepsis  Pneumoperitoneum  Colitis  · Patient was afebrile with stable WBC with resolution of sepsis until patient had temp of 100.9 F and slight increase in WBC to 12 o 9/30--> iproved to 10 now, afebrile overnight, CT chset/abd with no signs of pneumonia, no signs of any worsening infections, pneumoperitoneum resolved. Continue broad spectrum per ID  "recs, has permacath for antibitoics given difficult IV access from contractures  · LTAC and CM/SW working on placement for patient for continued medical care.   · General surgery consulted on admit for pneumoperitoneum seen on admit CT scan of abdomen and pelvis. General surgery felt likely contained leak and very small bowel perforation related to recent colitis. Due to patient's very poor functional level at baseline and malnutrition felt to be very poor surgical candidate and recommended conservative.  · ID consulted and recommended IV Vancomycin, Cefepime and Flagyl to treat both small perforation and osteomyelitis of pelvic area and to extend abx until 10/22. IR to place tunneled catheter on 9/28 for long term antibiotics as current midline cannot be extended that long in future and bedside PICC team unable to place bedside PICC due to severe arm contractures.   · ID recommends repeat CT scan of abdomen to re-evaluate perforation in 2-3 weeks or sooner if any acute worsening (done on 9/30 and improved)           Decubitus ulcer of ischial area, left, stage IV  Pressure injury of right ischium, stage 4  Osteomyelitis of pelvis  · Controlled. Wounds are improving. Continue current wound care (following in house known well to their team)  · Wounds appear clean with no definite signs of infection.   · As per wound care "Nursing to cleanse scrotal skin breakdown and R and L ischial pressure injuries with wound cleanser. Pack ischial pressure injuries with Triad ointment and gauze, then cover bordered foam dressing to prove autolytic debridement and prevent breakdown of intact skin. Apply Triad ointment over scrotal skin breakdown."  · Patient now with exposed bone to ischial areas and ID with concern for underlying osteomyelitis even though wounds themselves do not appear infected so recommend extension of long term abx with IV Vancomycin, Cefepime and po Flagyl until at least 10/22. IR placed tunneled line on 9/28 " for long term antibiotics to right chest wall.      Severe protein-calorie malnutrition  · Present on admit and related to colitis, recent medical illnesses. Patient with PEG and receiving TF with Prebio to help with nutrition. Likely cause of poor wound healing and development of ischial ulcers.   · Nutrition consulted and following and continue Peptamen with Prebio at 40 mL/hr as per nutrition recs to treat malnutrition. at goal now on feeds  10/1.        Seizure disorder  Chronic and controlled and continue Phenobarbital to treat.         Anemia of chronic disease  Chronic and controlled and related to chronic infection, poor nutrition and poor baseline function status. Monitor anemia with daily CBC and transfuse if Hgb < 7.   -stable at mid 9's now. Required 1 tx on last admit and stable since        Hypophosphatemia  · Replace PRN     Cerebral palsy  Chronic condition. Patient with poor baseline functional status and non-communicative and bed bound/wheelchair bound with extremity contractures to all extremities at baseline.   Requires 24/7 care at home by mom who is very involved in his care at baseline      Diet:  Tube feeds, NPO, G tube only  DVT PPx:  teds/scds      Disposition:  Awaiting lTAC  Will need follow up on discharge  Discharge Planning   ROCÍO: 10/2/2020     Code Status: Full Code   Is the patient medically ready for discharge?: Yes    Reason for patient still in hospital (select all that apply): Other (specify) pending LTAC  Discharge Plan A: Long-term acute care facility (LTAC)   Discharge Delays: None known at this time

## 2020-10-01 NOTE — PLAN OF CARE
VSS. Alert and nonverbal.  No s/s of pain.  Condom cath in place draining clear yellow urine.  Patient turned q2hr.  No acute events overnight. Safety maintained.  All questions answered. Patient updated on plan of care.  Will continue to monitor.

## 2020-10-01 NOTE — PLAN OF CARE
[facility]    FACILITY TRANSFER ORDERS      Patient Name: Glen Moscoso  YOB: 1995    PCP: Yadi Lawson MD   PCP Address: [address] [facility] [address] [place] [id number]  PCP Phone Number: [phone]  PCP Fax: [phone]    Encounter Date: 10/06/2020    Admit to: LTAC    Vital Signs:  Routine    Diagnoses:   Active Hospital Problems    Diagnosis  POA    *Sepsis [A41.9]  Yes    Osteomyelitis of pelvis [M86.9]  Yes    Severe protein-calorie malnutrition [E43]  Yes    Colitis [K52.9]  Yes    Pneumoperitoneum [K66.8]  Yes    Decubitus ulcer of ischial area, left, stage IV [L89.324]  Yes    Pressure injury of right ischium, stage 4 [L89.314]  Yes    Anemia of chronic disease [D63.8]  Yes    Hypophosphatemia [E83.39]  Yes    Seizure disorder [G40.909]  Yes    Cerebral palsy [G80.9]  Yes      Resolved Hospital Problems    Diagnosis Date Resolved POA    Fever [R50.9] 09/24/2020 Yes    Moderate malnutrition [E44.0] 09/24/2020 Yes    Pneumonia due to Pseudomonas aerginuosa and Serratia marcesens [J15.1] 09/24/2020 Yes       Allergies:Review of patient's allergies indicates:  No Known Allergies    Diet: tube feedings: Continuous Peptamen 1.5 with Prebio at 40 mL per hour for continous hours per day    Activities: Activity as tolerated    Nursing:   nasotracheal suction q4 hours    Chest physiotherapy TID/Mechanical cough assist device TID    Seizure precautions    Waffle mattress/Low air loss mattress     Labs:    Weekly cbc, cmp, crp, esr, vanc trough and fax results to ID, walter Bazan NP, at fax [phone].    CONSULTS:    Physical Therapy to evaluate and treat.  and Occupational Therapy to evaluate and treat.     ID consult to manage vancomycin    Respiratory therapy for respiratory care, CPT, nebulizers    MISCELLANEOUS CARE:  Routine Skin for Bedridden Patients: Apply moisture barrier cream to all skin folds and wet areas in perineal area daily and after baths  and all bowel movements.     Keep Right chest tunneled line covered with tegrederm.   Infusions of antibiotics via R tunneled line per orders below.    WOUND CARE ORDERS     Cleanse scrotal skin breakdown and R and L ischial pressure injuries with wound cleanser. Pack ischial pressure injuries with triad ointment and gauze. Cover with bordered foam dressing. Apply triad ointment over scrotal skin breakdown.     Medications: Review discharge medications with patient and family and provide education.      Current Discharge Medication List      START taking these medications    Details   acetaminophen (TYLENOL) 325 MG tablet 2 tablets (650 mg total) by Per G Tube route every 4 (four) hours as needed (subjective signs of pain).  Qty:  , Refills: 0      albuterol-ipratropium (DUO-NEB) 2.5 mg-0.5 mg/3 mL nebulizer solution Take 3 mLs by nebulization every 4 (four) hours as needed for Wheezing or Shortness of Breath. Rescue  Qty: 1 Box, Refills: 0      bisacodyL (DULCOLAX) 10 mg Supp Place 1 suppository (10 mg total) rectally daily as needed (Until bowel movement if patient has no bowel movement for 2 days).  Qty:  , Refills: 0      ceFEPIme (MAXIPIME) 1 gram injection Inject 1 g into the vein every 8 (eight) hours. for 21 days  Qty: 63 g, Refills: 0   -end date 10/22/20   melatonin (MELATIN) 3 mg tablet 2 tablets (6 mg total) by Per G Tube route nightly as needed for Insomnia.  Qty:  , Refills: 0      metroNIDAZOLE (FLAGYL) 500 MG tablet 1 tablet (500 mg total) by Per G Tube route 3 (three) times daily. for 21 days  Qty: 63 tablet, Refills: 0   -end date 10/22/20   vancomycin HCl in 5 % dextrose (VANCOMYCIN IN DEXTROSE 5 %) 1 gram/250 mL Soln Inject 250 mLs (1,000 mg total) into the vein every 12 (twelve) hours. for 21 days   -end date 10/22/20      CONTINUE these medications which have CHANGED    Details   baclofen (LIORESAL) 10 MG tablet 1 tablet (10 mg total) by Per G Tube route 3 (three) times daily.  Qty: 90 tablet,  Refills: 11      Lactobacillus rhamnosus GG (CULTURELLE) 10 billion cell capsule 1 capsule by Per G Tube route once daily.  Qty:        loperamide (IMODIUM) 1 mg/7.5 mL solution 15 mLs (2 mg total) by Per G Tube route 4 (four) times daily. for 10 days  Refills: 0      PHENobarbitaL 20 mg/5 mL (4 mg/mL) Elix elixir 25 mLs (100 mg total) by Per G Tube route every evening.  Qty: 473 mL, Refills: 0      propranoloL (INDERAL) 20 MG tablet 1 tablet (20 mg total) by Per G Tube route 3 (three) times daily.  Qty: 90 tablet, Refills: 11    Comments: .         CONTINUE these medications which have NOT CHANGED    Details   ascorbic acid, vitamin C, (VITAMIN C) 500 MG tablet 1 tablet (500 mg total) by Per G Tube route once daily.  Qty:        diphenhydrAMINE (BENADRYL) 12.5 mg/5 mL elixir 10 mLs (25 mg total) by Per G Tube route 4 (four) times daily as needed for Allergies.  Refills: 0      gabapentin (NEURONTIN) 250 mg/5 mL solution 5 mLs (250 mg total) by Per G Tube route nightly. At bedtime      multivitamin liquid no.118 Liqd 10 mLs by Per G Tube route once daily.  Qty:           STOP taking these medications       cholestyramine (QUESTRAN) 4 gram packet Comments:   Reason for Stopping:         ibuprofen (ADVIL,MOTRIN) 100 mg/5 mL suspension Comments:   Reason for Stopping:         nystatin (MYCOSTATIN) cream Comments:   Reason for Stopping:         piperacillin sodium/tazobactam (PIPERACILLIN-TAZOBACTAM 4.5G/100ML SODIUM CHLORIDE 0.9%-READY TO MIX) Comments:   Reason for Stopping:                    _________________________________  Quiana Armenta MD  10/01/2020

## 2020-10-01 NOTE — PLAN OF CARE
Problem: Adult Inpatient Plan of Care  Goal: Plan of Care Review  Outcome: Ongoing, Progressing   VSS, nonverbal. Wound care completed. Turned q2h. Waiting for LTAC placement. No acute changes. Safety maintained. Will continue to monitor.

## 2020-10-02 LAB
ALBUMIN SERPL BCP-MCNC: 2.1 G/DL (ref 3.5–5.2)
ALP SERPL-CCNC: 91 U/L (ref 55–135)
ALT SERPL W/O P-5'-P-CCNC: 10 U/L (ref 10–44)
ANION GAP SERPL CALC-SCNC: 9 MMOL/L (ref 8–16)
ASCENDING AORTA: 2.33 CM
AST SERPL-CCNC: 15 U/L (ref 10–40)
AV INDEX (PROSTH): 0.85
AV MEAN GRADIENT: 2 MMHG
AV PEAK GRADIENT: 3 MMHG
AV VALVE AREA: 2.96 CM2
AV VELOCITY RATIO: 0.83
BASOPHILS # BLD AUTO: 0.03 K/UL (ref 0–0.2)
BASOPHILS NFR BLD: 0.3 % (ref 0–1.9)
BILIRUB SERPL-MCNC: 0.1 MG/DL (ref 0.1–1)
BSA FOR ECHO PROCEDURE: 1.15 M2
BUN SERPL-MCNC: 9 MG/DL (ref 6–20)
CALCIUM SERPL-MCNC: 8.9 MG/DL (ref 8.7–10.5)
CHLORIDE SERPL-SCNC: 104 MMOL/L (ref 95–110)
CO2 SERPL-SCNC: 26 MMOL/L (ref 23–29)
CREAT SERPL-MCNC: 0.5 MG/DL (ref 0.5–1.4)
CRP SERPL-MCNC: 56.8 MG/L (ref 0–8.2)
CV ECHO LV RWT: 0.3 CM
D DIMER PPP IA.FEU-MCNC: 1.03 MG/L FEU
DIFFERENTIAL METHOD: ABNORMAL
DOP CALC AO PEAK VEL: 0.92 M/S
DOP CALC AO VTI: 13.79 CM
DOP CALC LVOT AREA: 3.5 CM2
DOP CALC LVOT DIAMETER: 2.1 CM
DOP CALC LVOT PEAK VEL: 0.76 M/S
DOP CALC LVOT STROKE VOLUME: 40.78 CM3
DOP CALCLVOT PEAK VEL VTI: 11.78 CM
E WAVE DECELERATION TIME: 81.19 MSEC
E/A RATIO: 0.97
E/E' RATIO: 5.84 M/S
ECHO LV POSTERIOR WALL: 0.58 CM (ref 0.6–1.1)
EOSINOPHIL # BLD AUTO: 0 K/UL (ref 0–0.5)
EOSINOPHIL NFR BLD: 0 % (ref 0–8)
ERYTHROCYTE [DISTWIDTH] IN BLOOD BY AUTOMATED COUNT: 14.6 % (ref 11.5–14.5)
EST. GFR  (AFRICAN AMERICAN): >60 ML/MIN/1.73 M^2
EST. GFR  (NON AFRICAN AMERICAN): >60 ML/MIN/1.73 M^2
FRACTIONAL SHORTENING: 17 % (ref 28–44)
GLUCOSE SERPL-MCNC: 103 MG/DL (ref 70–110)
HCT VFR BLD AUTO: 30.3 % (ref 40–54)
HGB BLD-MCNC: 9.3 G/DL (ref 14–18)
IMM GRANULOCYTES # BLD AUTO: 0.03 K/UL (ref 0–0.04)
IMM GRANULOCYTES NFR BLD AUTO: 0.3 % (ref 0–0.5)
INTERVENTRICULAR SEPTUM: 0.84 CM (ref 0.6–1.1)
IVRT: 70.41 MSEC
LA MAJOR: 4.46 CM
LA MINOR: 4.77 CM
LA WIDTH: 2.9 CM
LEFT ATRIUM SIZE: 2.26 CM
LEFT ATRIUM VOLUME INDEX: 22 ML/M2
LEFT ATRIUM VOLUME: 25.68 CM3
LEFT INTERNAL DIMENSION IN SYSTOLE: 3.22 CM (ref 2.1–4)
LEFT VENTRICLE DIASTOLIC VOLUME INDEX: 56.47 ML/M2
LEFT VENTRICLE DIASTOLIC VOLUME: 65.79 ML
LEFT VENTRICLE MASS INDEX: 66 G/M2
LEFT VENTRICLE SYSTOLIC VOLUME INDEX: 35.7 ML/M2
LEFT VENTRICLE SYSTOLIC VOLUME: 41.58 ML
LEFT VENTRICULAR INTERNAL DIMENSION IN DIASTOLE: 3.9 CM (ref 3.5–6)
LEFT VENTRICULAR MASS: 76.52 G
LV LATERAL E/E' RATIO: 6.08 M/S
LV SEPTAL E/E' RATIO: 5.62 M/S
LYMPHOCYTES # BLD AUTO: 1.5 K/UL (ref 1–4.8)
LYMPHOCYTES NFR BLD: 15.1 % (ref 18–48)
MAGNESIUM SERPL-MCNC: 1.9 MG/DL (ref 1.6–2.6)
MCH RBC QN AUTO: 29.9 PG (ref 27–31)
MCHC RBC AUTO-ENTMCNC: 30.7 G/DL (ref 32–36)
MCV RBC AUTO: 97 FL (ref 82–98)
MONOCYTES # BLD AUTO: 1.3 K/UL (ref 0.3–1)
MONOCYTES NFR BLD: 12.9 % (ref 4–15)
MV PEAK A VEL: 0.75 M/S
MV PEAK E VEL: 0.73 M/S
MV STENOSIS PRESSURE HALF TIME: 23.55 MS
MV VALVE AREA P 1/2 METHOD: 9.34 CM2
NEUTROPHILS # BLD AUTO: 7.2 K/UL (ref 1.8–7.7)
NEUTROPHILS NFR BLD: 71.4 % (ref 38–73)
NRBC BLD-RTO: 0 /100 WBC
PHOSPHATE SERPL-MCNC: 2.9 MG/DL (ref 2.7–4.5)
PISA TR MAX VEL: 2.42 M/S
PLATELET # BLD AUTO: 710 K/UL (ref 150–350)
PMV BLD AUTO: 9.2 FL (ref 9.2–12.9)
POTASSIUM SERPL-SCNC: 4.1 MMOL/L (ref 3.5–5.1)
PROCALCITONIN SERPL IA-MCNC: 0.11 NG/ML
PROT SERPL-MCNC: 7 G/DL (ref 6–8.4)
RA MAJOR: 3.43 CM
RA PRESSURE: 3 MMHG
RA WIDTH: 2.54 CM
RBC # BLD AUTO: 3.11 M/UL (ref 4.6–6.2)
RIGHT VENTRICULAR END-DIASTOLIC DIMENSION: 2.92 CM
RV TISSUE DOPPLER FREE WALL SYSTOLIC VELOCITY 1 (APICAL 4 CHAMBER VIEW): 14.85 CM/S
SINUS: 2.79 CM
SODIUM SERPL-SCNC: 139 MMOL/L (ref 136–145)
STJ: 2.54 CM
TDI LATERAL: 0.12 M/S
TDI SEPTAL: 0.13 M/S
TDI: 0.13 M/S
TR MAX PG: 23 MMHG
TRICUSPID ANNULAR PLANE SYSTOLIC EXCURSION: 2.36 CM
TV REST PULMONARY ARTERY PRESSURE: 26 MMHG
VANCOMYCIN TROUGH SERPL-MCNC: 12.5 UG/ML (ref 10–22)
WBC # BLD AUTO: 10.13 K/UL (ref 3.9–12.7)

## 2020-10-02 PROCEDURE — 63600175 PHARM REV CODE 636 W HCPCS: Performed by: HOSPITALIST

## 2020-10-02 PROCEDURE — 86140 C-REACTIVE PROTEIN: CPT

## 2020-10-02 PROCEDURE — 80202 ASSAY OF VANCOMYCIN: CPT

## 2020-10-02 PROCEDURE — 99233 PR SUBSEQUENT HOSPITAL CARE,LEVL III: ICD-10-PCS | Mod: ,,, | Performed by: HOSPITALIST

## 2020-10-02 PROCEDURE — 99900035 HC TECH TIME PER 15 MIN (STAT)

## 2020-10-02 PROCEDURE — 36415 COLL VENOUS BLD VENIPUNCTURE: CPT

## 2020-10-02 PROCEDURE — 80053 COMPREHEN METABOLIC PANEL: CPT

## 2020-10-02 PROCEDURE — 94761 N-INVAS EAR/PLS OXIMETRY MLT: CPT

## 2020-10-02 PROCEDURE — 85025 COMPLETE CBC W/AUTO DIFF WBC: CPT

## 2020-10-02 PROCEDURE — 20600001 HC STEP DOWN PRIVATE ROOM

## 2020-10-02 PROCEDURE — 83735 ASSAY OF MAGNESIUM: CPT

## 2020-10-02 PROCEDURE — 94668 MNPJ CHEST WALL SBSQ: CPT

## 2020-10-02 PROCEDURE — 63600175 PHARM REV CODE 636 W HCPCS: Performed by: INTERNAL MEDICINE

## 2020-10-02 PROCEDURE — 25000003 PHARM REV CODE 250: Performed by: INTERNAL MEDICINE

## 2020-10-02 PROCEDURE — 85379 FIBRIN DEGRADATION QUANT: CPT

## 2020-10-02 PROCEDURE — 25000003 PHARM REV CODE 250: Performed by: HOSPITALIST

## 2020-10-02 PROCEDURE — 99233 SBSQ HOSP IP/OBS HIGH 50: CPT | Mod: ,,, | Performed by: HOSPITALIST

## 2020-10-02 PROCEDURE — 84145 PROCALCITONIN (PCT): CPT

## 2020-10-02 PROCEDURE — 84100 ASSAY OF PHOSPHORUS: CPT

## 2020-10-02 PROCEDURE — 99900026 HC AIRWAY MAINTENANCE (STAT)

## 2020-10-02 RX ORDER — ENOXAPARIN SODIUM 100 MG/ML
30 INJECTION SUBCUTANEOUS EVERY 24 HOURS
Status: DISCONTINUED | OUTPATIENT
Start: 2020-10-02 | End: 2020-10-04

## 2020-10-02 RX ADMIN — ACETAMINOPHEN 650 MG: 325 TABLET ORAL at 10:10

## 2020-10-02 RX ADMIN — METRONIDAZOLE 500 MG: 500 TABLET ORAL at 02:10

## 2020-10-02 RX ADMIN — Medication 1 CAPSULE: at 09:10

## 2020-10-02 RX ADMIN — BACLOFEN 10 MG: 10 TABLET ORAL at 09:10

## 2020-10-02 RX ADMIN — VANCOMYCIN HYDROCHLORIDE 1250 MG: 1.25 INJECTION, POWDER, LYOPHILIZED, FOR SOLUTION INTRAVENOUS at 09:10

## 2020-10-02 RX ADMIN — ENOXAPARIN SODIUM 30 MG: 40 INJECTION SUBCUTANEOUS at 05:10

## 2020-10-02 RX ADMIN — LOPERAMIDE HYDROCHLORIDE 2 MG: 1 SOLUTION ORAL at 05:10

## 2020-10-02 RX ADMIN — METRONIDAZOLE 500 MG: 500 TABLET ORAL at 09:10

## 2020-10-02 RX ADMIN — PHENOBARBITAL 100 MG: 20 ELIXIR ORAL at 09:10

## 2020-10-02 RX ADMIN — METRONIDAZOLE 500 MG: 500 TABLET ORAL at 10:10

## 2020-10-02 RX ADMIN — VANCOMYCIN HYDROCHLORIDE 1000 MG: 1 INJECTION, POWDER, LYOPHILIZED, FOR SOLUTION INTRAVENOUS at 09:10

## 2020-10-02 RX ADMIN — LOPERAMIDE HYDROCHLORIDE 2 MG: 1 SOLUTION ORAL at 12:10

## 2020-10-02 RX ADMIN — LOPERAMIDE HYDROCHLORIDE 2 MG: 1 SOLUTION ORAL at 09:10

## 2020-10-02 RX ADMIN — Medication 10 ML: at 09:10

## 2020-10-02 RX ADMIN — CEFEPIME 1 G: 1 INJECTION, POWDER, FOR SOLUTION INTRAMUSCULAR; INTRAVENOUS at 02:10

## 2020-10-02 RX ADMIN — Medication 500 MG: at 09:10

## 2020-10-02 RX ADMIN — BACLOFEN 10 MG: 10 TABLET ORAL at 10:10

## 2020-10-02 RX ADMIN — PROPRANOLOL HYDROCHLORIDE 20 MG: 20 TABLET ORAL at 09:10

## 2020-10-02 RX ADMIN — CEFEPIME 1 G: 1 INJECTION, POWDER, FOR SOLUTION INTRAMUSCULAR; INTRAVENOUS at 06:10

## 2020-10-02 RX ADMIN — BACLOFEN 10 MG: 10 TABLET ORAL at 02:10

## 2020-10-02 RX ADMIN — CEFEPIME 1 G: 1 INJECTION, POWDER, FOR SOLUTION INTRAMUSCULAR; INTRAVENOUS at 12:10

## 2020-10-02 RX ADMIN — PROPRANOLOL HYDROCHLORIDE 20 MG: 20 TABLET ORAL at 02:10

## 2020-10-02 RX ADMIN — PROPRANOLOL HYDROCHLORIDE 20 MG: 20 TABLET ORAL at 10:10

## 2020-10-02 RX ADMIN — GABAPENTIN 250 MG: 250 SOLUTION ORAL at 09:10

## 2020-10-02 NOTE — PLAN OF CARE
NICOLA orientation. Spiked a temp of 100.7 at shift change. Administered PRN Tylenol and patient has been afebrile since. Patient remains tachycardic. All other vitals stable. Peptamen 1.5 infusing at goal rate of 40 cc/hr. Tolerating well with minimal residuals. Possible D/C to LTAC today. No acute needs at the moment. Bed in lowest position, call light and personal items within reach. Will continue to monitor.

## 2020-10-02 NOTE — PLAN OF CARE
10/02/20 1431   Discharge Reassessment   Assessment Type Discharge Planning Reassessment   Discharge Plan A Long-term acute care facility (LTAC)   Discharge Plan B Home Health   DME Needed Upon Discharge  other (see comments)  (TBD)   Anticipated Discharge Disposition LTAC   Post-Acute Status   Post-Acute Authorization Placement   Post-Acute Placement Status Referrals Sent

## 2020-10-02 NOTE — PROGRESS NOTES
Progress Note   Hospital Medicine         Patient Name: Glen Moscoso  MRN:  [id number]  [facility] Medicine Team: [facility] [place] Quiana Armenta MD  Date of Admission:  9/20/2020     Length of Stay:  LOS: 12 days   Expected Discharge Date: 10/2/2020  Principal Problem:  Sepsis       Subjective:     Interval History/Overnight Events:    *had 100.7 fever of unclear etiology. Has had intermittent fevesr like this but had been afebile for 48 hours. Was CT scanned for this on Wednesday with improvement of bowel issues, sacral wounds better, no signs pneumonia. CRp is 58 beest its been, procal neg. Given mild tachy, D dimer obtained is 1, has been higher 1's a month ago during 1st stay. Has had LE imaging 2 weeks ago, neg. On exam has some spontaneous signs of grimacing but is nonspecific. Does not correlate with touch or exam. He got tearful and then stopped when I talked about his mom. He then smiled when music came on the tv. g tube has a raji bit of redness but appears chronic on site. No pain with palpation abdomen. On room air. Will check echo, may consider assessing for PE as intermed prob for source of fever if reoccurs but no sign on CT chest yesterday. Given last disharg he returned with 103 fever in 2 days and had pneumoperitoneum issue, would watch this weekend and plan for LATC as early as Monday, if facility has also been found  Mom called, message left, will update her once she comes to floor this PM after her MD appt.      Review of Systems   Unable to assess secondary to condition.    Objective:     Temp:  [98.7 °F (37.1 °C)-100.7 °F (38.2 °C)]   Pulse:  []   Resp:  [17-20]   BP: (116-133)/(65-83)   SpO2:  [97 %-99 %]       Physical Exam:  Constitutional: Appears chronic ill appearing. More active during exam. at times seems tearful but then perks up and smiles.  Head: Normocephalic and atraumatic.   Mouth/Throat: Oropharynx is clear and moist.   Eyes: EOM are normal. Pupils are equal, round, and  reactive to light. No scleral icterus.   Neck: Normal range of motion. Neck supple.   Cardiovascular: Normal rate and regular rhythm.  No murmur heard.  Pulmonary/Chest: Effort normal and breath sounds normal. Right chest all permacath, covered in tegrederm. No respiratory distress. No wheezes, rales, or rhonchi  Abdominal: Soft. Bowel sounds are normal.  No distension or tenderness. PEG in place. Chronic redness mild. No carleen with palpation of quadrants. Stable.  Musculoskeletal: contractures in arms, legs.   Neurological: non communicative, baseline.  Skin: Skin is warm and dry.   Psychiatric: at baseline, noncommunicative. More active and alert when talk to him     Recent Labs   Lab 09/27/20  0425 09/28/20  0235 09/29/20  0351 09/30/20 0417 10/01/20  0339 10/02/20  0405   WBC 10.36 10.26 10.94 12.87* 10.91 10.13   HGB 10.9* 9.3* 9.2* 9.0* 9.6* 9.3*   HCT 36.2* 30.4* 30.4* 29.4* 31.2* 30.3*   * 631* 632* 667* 669* 710*     Recent Labs   Lab 09/30/20  0417 10/01/20  0339 10/02/20  0404    136 139   K 3.9 3.9 4.1    102 104   CO2 24 25 26   BUN 7 8 9   CREATININE 0.5 0.5 0.5   * 132* 103   CALCIUM 8.1* 8.9 8.9   MG 1.8 1.9 1.9   PHOS 2.3* 3.0 2.9     Recent Labs   Lab 09/30/20  0417 10/01/20  0339 10/02/20  0404   ALKPHOS 101 107 91   ALT 14 12 10   AST 14 16 15   ALBUMIN 2.0* 2.3* 2.1*   PROT 6.7 7.2 7.0   BILITOT 0.2 0.1 0.1     Recent Labs   Lab 09/30/20  1104   POCTGLUCOSE 160*        ascorbic acid (vitamin C)  500 mg Per G Tube Daily    baclofen  10 mg Per G Tube TID    ceFEPime (MAXIPIME) IVPB  1 g Intravenous Q8H    gabapentin  250 mg Per G Tube QHS    Lactobacillus rhamnosus GG  1 capsule Per G Tube Daily    loperamide  2 mg Per G Tube QID    metroNIDAZOLE  500 mg Per G Tube TID    multivitamin liquid no.118  10 mL Per G Tube Daily    PHENobarbitaL  100 mg Per G Tube QHS    propranoloL  20 mg Per G Tube TID    vancomycin (VANCOCIN) IVPB  1,000 mg Intravenous Q12H        Assessment and Plan     Mr. Glen Moscoso is a 24 y.o. male who presented to [facility] on 9/20/2020 with     Sepsis  Pneumoperitoneum  Colitis  -fever again after 48 hours fever free overnight to 100.7 x 1. WBC stable at 10 still, CRP 58, procal neg. Recently imaged without signs of infection. HR low 100s. Will trend, given had fevers last admit and dc with very quick readmit will cautiously watch over weekend even if LTAC ready, low threshold ID consult again if persists.   · Patient was afebrile with stable WBC with resolution of sepsis until patient had temp of 100.9 F and slight increase in WBC to 12 o 9/30--> iproved to 10 now, afebrile overnight, CT chset/abd with no signs of pneumonia, no signs of any worsening infections, pneumoperitoneum resolved. Continue broad spectrum per ID recs, has permacath for antibitoics given difficult IV access from contractures  · LTAC and CM/SW working on placement for patient for continued medical care.   · General surgery consulted on admit for pneumoperitoneum seen on admit CT scan of abdomen and pelvis. General surgery felt likely contained leak and very small bowel perforation related to recent colitis. Due to patient's very poor functional level at baseline and malnutrition felt to be very poor surgical candidate and recommended conservative.  · ID consulted and recommended IV Vancomycin, Cefepime and Flagyl to treat both small perforation and osteomyelitis of pelvic area and to extend abx until 10/22. IR to place tunneled catheter on 9/28 for long term antibiotics as current midline cannot be extended that long in future and bedside PICC team unable to place bedside PICC due to severe arm contractures.   · ID recommends repeat CT scan of abdomen to re-evaluate perforation in 2-3 weeks or sooner if any acute worsening (done on 9/30 and improved)           Decubitus ulcer of ischial area, left, stage IV  Pressure injury of right ischium, stage 4  Osteomyelitis of  "pelvis  · Controlled. Wounds are improving. Continue current wound care (following in house known well to their team)  · Wounds appear clean with no definite signs of infection.   · As per wound care "Nursing to cleanse scrotal skin breakdown and R and L ischial pressure injuries with wound cleanser. Pack ischial pressure injuries with Triad ointment and gauze, then cover bordered foam dressing to prove autolytic debridement and prevent breakdown of intact skin. Apply Triad ointment over scrotal skin breakdown."  · Patient now with exposed bone to ischial areas and ID with concern for underlying osteomyelitis even though wounds themselves do not appear infected so recommend extension of long term abx with IV Vancomycin, Cefepime and po Flagyl until at least 10/22. IR placed tunneled line on 9/28 for long term antibiotics to right chest wall.      Severe protein-calorie malnutrition  · Present on admit and related to colitis, recent medical illnesses. Patient with PEG and receiving TF with Prebio to help with nutrition. Likely cause of poor wound healing and development of ischial ulcers.   · Nutrition consulted and following and continue Peptamen with Prebio at 40 mL/hr as per nutrition recs to treat malnutrition. at goal now on feeds  10/1.        Seizure disorder  Chronic and controlled and continue Phenobarbital to treat.         Anemia of chronic disease  Chronic and controlled and related to chronic infection, poor nutrition and poor baseline function status. Monitor anemia with daily CBC and transfuse if Hgb < 7.   -stable at mid 9's now. Required 1 tx on last admit and stable since        Hypophosphatemia  · Replace PRN     Cerebral palsy  Chronic condition. Patient with poor baseline functional status and non-communicative and bed bound/wheelchair bound with extremity contractures to all extremities at baseline.   Requires 24/7 care at home by mom who is very involved in his care at " baseline    Tachycardia  -chronic due to CP and reflex tachy issues, on home BB  -given sedentary more than usual in hospital now and fevers, will check D dimer  (1..0) and repeat echo for any signs for PE. Had UE LE 2 weeks ago negative for fever. Has had superficial clots in UE in last month from lines  -if fever and tachy persist, will check CTA this weekend. Will resume at least px lovenox now.  -wells score of 3, moderate risk.      Diet:  Tube feeds, NPO, G tube only  DVT PPx:  Teds/scds. Resume ppx lovenox at least now. If signs of PE persist, will check further.      Disposition:  Awaiting lTAC. Given fever again and mild tachy. Would hold dc until Monday even if  LTAC obtained to trend and work up further.  Will need  ID follow up on discharge  Discharge Planning   ROCÍO: 10/5/2020     Code Status: Full Code   Is the patient medically ready for discharge?: NO    Reason for patient still in hospital (select all that apply): Other (specify) pending LTAC  Discharge Plan A: Long-term acute care facility (LTAC)   Discharge Delays: None known at this time

## 2020-10-02 NOTE — PLAN OF CARE
Problem: Adult Inpatient Plan of Care  Goal: Plan of Care Review  Outcome: Ongoing, Progressing  Goal: Patient-Specific Goal (Individualization)  Outcome: Ongoing, Progressing  Flowsheets (Taken 10/2/2020 1751)  Individualized Care Needs: turn q2h. Wound care  Anxieties, Fears or Concerns: none  Patient-Specific Goals (Include Timeframe): NICOLA  Goal: Optimal Comfort and Wellbeing  Outcome: Ongoing, Progressing  Intervention: Provide Person-Centered Care  Flowsheets (Taken 10/2/2020 1751)  Trust Relationship/Rapport:   care explained   choices provided   emotional support provided   reassurance provided   thoughts/feelings acknowledged     Problem: Nutrition Impaired (Sepsis/Septic Shock)  Goal: Optimal Nutrition Intake  Outcome: Ongoing, Progressing  Intervention: Promote and Optimize Nutrition Delivery  Flowsheets (Taken 10/2/2020 1751)  Nutrition Support Management: (Tube feedings continued) other (see comments)     Problem: Wound  Goal: Optimal Wound Healing  Outcome: Ongoing, Progressing  Intervention: Promote Effective Wound Healing  Flowsheets (Taken 10/2/2020 1751)  Oral Nutrition Promotion:   calorie dense foods provided   rest periods promoted   safe use of adaptive equipment encouraged  Sleep/Rest Enhancement: awakenings minimized  Pain Management Interventions:   care clustered   pillow support provided   position adjusted     Pt nonverbal, awake and alert, VSS. No acute changes today. Pt went for echo. Results pending. Mother at bedside briefly, updated on plan of care. Will continue to monitor.

## 2020-10-02 NOTE — PROGRESS NOTES
Pharmacokinetic Assessment Follow Up: IV Vancomycin    Vancomycin serum concentration assessment(s):    Vancomycin trough = 12.5 mcg/mL. Per MAR documentation, vancomycin hung @ 0941 and trough drawn @ 1002.  I believe this was charted backwards.  I do not think this concentration represents a peak.     Vancomycin Regimen Plan:  1. Increase to vancomycin 1250 mg IV q12h  2. Repeat trough @ 0900 on 10/4/20  3. Goal trough = 15-20 mcg/mL    Drug levels (last 3 results):  Recent Labs   Lab Result Units 10/02/20  1002   Vancomycin-Trough ug/mL 12.5       Thank you for the consult, will continue to follow  Oz Koo.D., BCPS  [id number]       Patient brief summary:  Glen Moscoso is a 24 y.o. male initiated on antimicrobial therapy with IV Vancomycin for treatment of pelvic osteomyelitis and pneumoperitoneum    Drug Allergies:   Review of patient's allergies indicates:  No Known Allergies    Actual Body Weight:   33.1kg    Renal Function:   Estimated Creatinine Clearance: 108.5 mL/min (based on SCr of 0.5 mg/dL).,       CBC (last 72 hours):  Recent Labs   Lab Result Units 09/30/20  0417 10/01/20  0339 10/02/20  0405   WBC K/uL 12.87* 10.91 10.13   Hemoglobin g/dL 9.0* 9.6* 9.3*   Hematocrit % 29.4* 31.2* 30.3*   Platelets K/uL 667* 669* 710*   Gran% % 74.8* 74.9* 71.4   Lymph% % 12.2* 12.6* 15.1*   Mono% % 12.6 12.0 12.9   Eosinophil% % 0.0 0.0 0.0   Basophil% % 0.2 0.2 0.3   Differential Method  Automated Automated Automated       Metabolic Panel (last 72 hours):  Recent Labs   Lab Result Units 09/30/20  0417 10/01/20  0339 10/02/20  0404   Sodium mmol/L 136 136 139   Potassium mmol/L 3.9 3.9 4.1   Chloride mmol/L 103 102 104   CO2 mmol/L 24 25 26   Glucose mg/dL 126* 132* 103   BUN, Bld mg/dL 7 8 9   Creatinine mg/dL 0.5 0.5 0.5   Albumin g/dL 2.0* 2.3* 2.1*   Total Bilirubin mg/dL 0.2 0.1 0.1   Alkaline Phosphatase U/L 101 107 91   AST U/L 14 16 15   ALT U/L 14 12 10   Magnesium mg/dL 1.8 1.9 1.9   Phosphorus mg/dL  2.3* 3.0 2.9       Vancomycin Administrations:  vancomycin given in the last 96 hours                     vancomycin 750 mg in dextrose 5 % 250 mL IVPB (ready to mix system) (mg) 750 mg New Bag 09/23/20 1958     750 mg New Bag  0812     750 mg New Bag 09/22/20 2155     750 mg New Bag  0822     750 mg New Bag 09/21/20 2236     750 mg New Bag  0804    vancomycin in dextrose 5 % 1 gram/250 mL IVPB 1,000 mg (mg) 1,000 mg New Bag 09/20/20 2016                    Microbiologic Results:  Microbiology Results (last 7 days)     Procedure Component Value Units Date/Time    Blood culture [[id number]] Collected: 09/24/20 0533    Order Status: Completed Specimen: Blood Updated: 09/29/20 1012     Blood Culture, Routine No growth after 5 days.    Blood culture [[id number]] Collected: 09/24/20 0533    Order Status: Completed Specimen: Blood Updated: 09/29/20 1012     Blood Culture, Routine No growth after 5 days.    Blood culture x two cultures. Draw prior to antibiotics. [[id number]] Collected: 09/20/20 1853    Order Status: Completed Specimen: Blood from Peripheral, Hand, Right Updated: 09/25/20 2012     Blood Culture, Routine No growth after 5 days.    Narrative:      Aerobic and anaerobic    Blood culture x two cultures. Draw prior to antibiotics. [[id number]] Collected: 09/20/20 1854    Order Status: Completed Specimen: Blood from Peripheral, Hand, Right Updated: 09/25/20 2012     Blood Culture, Routine No growth after 5 days.    Narrative:      Aerobic and anaerobic

## 2020-10-02 NOTE — PLAN OF CARE
10/02/20 1420   Post-Acute Status   Post-Acute Authorization Other   Post-Acute Placement Status Referrals Sent   Discharge Plan   Discharge Plan A Long-term acute care facility (LTAC)   Updates sent via "[employer].  [facility] has declined, stating that they cannot meet pt's needs.  SW will continue to F/U.      Cintia Guo, [facility] [place]  Ext [phone]

## 2020-10-03 PROBLEM — J96.01 ACUTE HYPOXEMIC RESPIRATORY FAILURE: Status: ACTIVE | Noted: 2020-10-03

## 2020-10-03 LAB
ALBUMIN SERPL BCP-MCNC: 2.2 G/DL (ref 3.5–5.2)
ALP SERPL-CCNC: 89 U/L (ref 55–135)
ALT SERPL W/O P-5'-P-CCNC: 9 U/L (ref 10–44)
ANION GAP SERPL CALC-SCNC: 9 MMOL/L (ref 8–16)
AST SERPL-CCNC: 11 U/L (ref 10–40)
BASOPHILS # BLD AUTO: 0.02 K/UL (ref 0–0.2)
BASOPHILS # BLD AUTO: 0.04 K/UL (ref 0–0.2)
BASOPHILS NFR BLD: 0.2 % (ref 0–1.9)
BASOPHILS NFR BLD: 0.3 % (ref 0–1.9)
BILIRUB SERPL-MCNC: 0.2 MG/DL (ref 0.1–1)
BUN SERPL-MCNC: 9 MG/DL (ref 6–20)
CALCIUM SERPL-MCNC: 9.1 MG/DL (ref 8.7–10.5)
CHLORIDE SERPL-SCNC: 101 MMOL/L (ref 95–110)
CO2 SERPL-SCNC: 27 MMOL/L (ref 23–29)
CREAT SERPL-MCNC: 0.5 MG/DL (ref 0.5–1.4)
DIFFERENTIAL METHOD: ABNORMAL
DIFFERENTIAL METHOD: ABNORMAL
EOSINOPHIL # BLD AUTO: 0 K/UL (ref 0–0.5)
EOSINOPHIL # BLD AUTO: 0 K/UL (ref 0–0.5)
EOSINOPHIL NFR BLD: 0 % (ref 0–8)
EOSINOPHIL NFR BLD: 0 % (ref 0–8)
ERYTHROCYTE [DISTWIDTH] IN BLOOD BY AUTOMATED COUNT: 14.3 % (ref 11.5–14.5)
ERYTHROCYTE [DISTWIDTH] IN BLOOD BY AUTOMATED COUNT: 14.3 % (ref 11.5–14.5)
EST. GFR  (AFRICAN AMERICAN): >60 ML/MIN/1.73 M^2
EST. GFR  (NON AFRICAN AMERICAN): >60 ML/MIN/1.73 M^2
GLUCOSE SERPL-MCNC: 119 MG/DL (ref 70–110)
HCT VFR BLD AUTO: 30 % (ref 40–54)
HCT VFR BLD AUTO: 31.1 % (ref 40–54)
HGB BLD-MCNC: 9.1 G/DL (ref 14–18)
HGB BLD-MCNC: 9.5 G/DL (ref 14–18)
IMM GRANULOCYTES # BLD AUTO: 0.04 K/UL (ref 0–0.04)
IMM GRANULOCYTES # BLD AUTO: 0.06 K/UL (ref 0–0.04)
IMM GRANULOCYTES NFR BLD AUTO: 0.3 % (ref 0–0.5)
IMM GRANULOCYTES NFR BLD AUTO: 0.4 % (ref 0–0.5)
LACTATE SERPL-SCNC: 0.6 MMOL/L (ref 0.5–2.2)
LYMPHOCYTES # BLD AUTO: 1.4 K/UL (ref 1–4.8)
LYMPHOCYTES # BLD AUTO: 1.4 K/UL (ref 1–4.8)
LYMPHOCYTES NFR BLD: 11.3 % (ref 18–48)
LYMPHOCYTES NFR BLD: 9.6 % (ref 18–48)
MAGNESIUM SERPL-MCNC: 1.8 MG/DL (ref 1.6–2.6)
MCH RBC QN AUTO: 29.6 PG (ref 27–31)
MCH RBC QN AUTO: 30 PG (ref 27–31)
MCHC RBC AUTO-ENTMCNC: 30.3 G/DL (ref 32–36)
MCHC RBC AUTO-ENTMCNC: 30.5 G/DL (ref 32–36)
MCV RBC AUTO: 98 FL (ref 82–98)
MCV RBC AUTO: 98 FL (ref 82–98)
MONOCYTES # BLD AUTO: 1.3 K/UL (ref 0.3–1)
MONOCYTES # BLD AUTO: 1.5 K/UL (ref 0.3–1)
MONOCYTES NFR BLD: 12.5 % (ref 4–15)
MONOCYTES NFR BLD: 9.3 % (ref 4–15)
NEUTROPHILS # BLD AUTO: 11.4 K/UL (ref 1.8–7.7)
NEUTROPHILS # BLD AUTO: 9.1 K/UL (ref 1.8–7.7)
NEUTROPHILS NFR BLD: 75.7 % (ref 38–73)
NEUTROPHILS NFR BLD: 80.4 % (ref 38–73)
NRBC BLD-RTO: 0 /100 WBC
NRBC BLD-RTO: 0 /100 WBC
PHOSPHATE SERPL-MCNC: 2.9 MG/DL (ref 2.7–4.5)
PLATELET # BLD AUTO: 702 K/UL (ref 150–350)
PLATELET # BLD AUTO: 735 K/UL (ref 150–350)
PMV BLD AUTO: 8.8 FL (ref 9.2–12.9)
PMV BLD AUTO: 9.3 FL (ref 9.2–12.9)
POTASSIUM SERPL-SCNC: 4.1 MMOL/L (ref 3.5–5.1)
PROT SERPL-MCNC: 7.1 G/DL (ref 6–8.4)
RBC # BLD AUTO: 3.07 M/UL (ref 4.6–6.2)
RBC # BLD AUTO: 3.17 M/UL (ref 4.6–6.2)
SODIUM SERPL-SCNC: 137 MMOL/L (ref 136–145)
WBC # BLD AUTO: 11.99 K/UL (ref 3.9–12.7)
WBC # BLD AUTO: 14.23 K/UL (ref 3.9–12.7)

## 2020-10-03 PROCEDURE — 63600175 PHARM REV CODE 636 W HCPCS: Performed by: INTERNAL MEDICINE

## 2020-10-03 PROCEDURE — 99291 CRITICAL CARE FIRST HOUR: CPT | Mod: ,,, | Performed by: NURSE PRACTITIONER

## 2020-10-03 PROCEDURE — 84100 ASSAY OF PHOSPHORUS: CPT

## 2020-10-03 PROCEDURE — 87077 CULTURE AEROBIC IDENTIFY: CPT

## 2020-10-03 PROCEDURE — 83735 ASSAY OF MAGNESIUM: CPT

## 2020-10-03 PROCEDURE — 31720 CLEARANCE OF AIRWAYS: CPT

## 2020-10-03 PROCEDURE — 85025 COMPLETE CBC W/AUTO DIFF WBC: CPT

## 2020-10-03 PROCEDURE — 87070 CULTURE OTHR SPECIMN AEROBIC: CPT

## 2020-10-03 PROCEDURE — 25000003 PHARM REV CODE 250: Performed by: INTERNAL MEDICINE

## 2020-10-03 PROCEDURE — 99900035 HC TECH TIME PER 15 MIN (STAT)

## 2020-10-03 PROCEDURE — 83605 ASSAY OF LACTIC ACID: CPT

## 2020-10-03 PROCEDURE — 63600175 PHARM REV CODE 636 W HCPCS: Performed by: NURSE PRACTITIONER

## 2020-10-03 PROCEDURE — 94667 MNPJ CHEST WALL 1ST: CPT

## 2020-10-03 PROCEDURE — 27000221 HC OXYGEN, UP TO 24 HOURS

## 2020-10-03 PROCEDURE — 36415 COLL VENOUS BLD VENIPUNCTURE: CPT

## 2020-10-03 PROCEDURE — 25000242 PHARM REV CODE 250 ALT 637 W/ HCPCS: Performed by: PHYSICIAN ASSISTANT

## 2020-10-03 PROCEDURE — 25000003 PHARM REV CODE 250: Performed by: PHYSICIAN ASSISTANT

## 2020-10-03 PROCEDURE — 95714 VEEG EA 12-26 HR UNMNTR: CPT

## 2020-10-03 PROCEDURE — 63600175 PHARM REV CODE 636 W HCPCS: Performed by: HOSPITALIST

## 2020-10-03 PROCEDURE — 25000242 PHARM REV CODE 250 ALT 637 W/ HCPCS: Performed by: NURSE PRACTITIONER

## 2020-10-03 PROCEDURE — 99233 PR SUBSEQUENT HOSPITAL CARE,LEVL III: ICD-10-PCS | Mod: ,,, | Performed by: PHYSICIAN ASSISTANT

## 2020-10-03 PROCEDURE — 25000003 PHARM REV CODE 250: Performed by: HOSPITALIST

## 2020-10-03 PROCEDURE — 99233 SBSQ HOSP IP/OBS HIGH 50: CPT | Mod: ,,, | Performed by: PHYSICIAN ASSISTANT

## 2020-10-03 PROCEDURE — 95720 EEG PHY/QHP EA INCR W/VEEG: CPT | Mod: ,,, | Performed by: PSYCHIATRY & NEUROLOGY

## 2020-10-03 PROCEDURE — 87186 SC STD MICRODIL/AGAR DIL: CPT

## 2020-10-03 PROCEDURE — 99233 PR SUBSEQUENT HOSPITAL CARE,LEVL III: ICD-10-PCS | Mod: ,,, | Performed by: HOSPITALIST

## 2020-10-03 PROCEDURE — 99291 PR CRITICAL CARE, E/M 30-74 MINUTES: ICD-10-PCS | Mod: ,,, | Performed by: NURSE PRACTITIONER

## 2020-10-03 PROCEDURE — 99900026 HC AIRWAY MAINTENANCE (STAT)

## 2020-10-03 PROCEDURE — 80053 COMPREHEN METABOLIC PANEL: CPT

## 2020-10-03 PROCEDURE — 95700 EEG CONT REC W/VID EEG TECH: CPT

## 2020-10-03 PROCEDURE — 20000000 HC ICU ROOM

## 2020-10-03 PROCEDURE — 94640 AIRWAY INHALATION TREATMENT: CPT

## 2020-10-03 PROCEDURE — 99233 SBSQ HOSP IP/OBS HIGH 50: CPT | Mod: ,,, | Performed by: HOSPITALIST

## 2020-10-03 PROCEDURE — 94668 MNPJ CHEST WALL SBSQ: CPT

## 2020-10-03 PROCEDURE — 94761 N-INVAS EAR/PLS OXIMETRY MLT: CPT

## 2020-10-03 PROCEDURE — 87205 SMEAR GRAM STAIN: CPT

## 2020-10-03 PROCEDURE — 95720 PR EEG, W/VIDEO, CONT RECORD, I&R, >12<26 HRS: ICD-10-PCS | Mod: ,,, | Performed by: PSYCHIATRY & NEUROLOGY

## 2020-10-03 PROCEDURE — 87040 BLOOD CULTURE FOR BACTERIA: CPT

## 2020-10-03 RX ORDER — IPRATROPIUM BROMIDE AND ALBUTEROL SULFATE 2.5; .5 MG/3ML; MG/3ML
3 SOLUTION RESPIRATORY (INHALATION) EVERY 4 HOURS
Status: DISCONTINUED | OUTPATIENT
Start: 2020-10-03 | End: 2020-10-09 | Stop reason: HOSPADM

## 2020-10-03 RX ADMIN — METRONIDAZOLE 500 MG: 500 TABLET ORAL at 09:10

## 2020-10-03 RX ADMIN — Medication 1 CAPSULE: at 09:10

## 2020-10-03 RX ADMIN — LOPERAMIDE HYDROCHLORIDE 2 MG: 1 SOLUTION ORAL at 09:10

## 2020-10-03 RX ADMIN — IPRATROPIUM BROMIDE AND ALBUTEROL SULFATE 3 ML: .5; 2.5 SOLUTION RESPIRATORY (INHALATION) at 07:10

## 2020-10-03 RX ADMIN — VANCOMYCIN HYDROCHLORIDE 1250 MG: 1.25 INJECTION, POWDER, LYOPHILIZED, FOR SOLUTION INTRAVENOUS at 09:10

## 2020-10-03 RX ADMIN — CEFEPIME 1 G: 1 INJECTION, POWDER, FOR SOLUTION INTRAMUSCULAR; INTRAVENOUS at 02:10

## 2020-10-03 RX ADMIN — BACLOFEN 10 MG: 10 TABLET ORAL at 05:10

## 2020-10-03 RX ADMIN — IPRATROPIUM BROMIDE AND ALBUTEROL SULFATE 3 ML: .5; 2.5 SOLUTION RESPIRATORY (INHALATION) at 11:10

## 2020-10-03 RX ADMIN — ACETAMINOPHEN 650 MG: 325 TABLET ORAL at 05:10

## 2020-10-03 RX ADMIN — Medication 500 MG: at 09:10

## 2020-10-03 RX ADMIN — BACLOFEN 10 MG: 10 TABLET ORAL at 09:10

## 2020-10-03 RX ADMIN — Medication 10 ML: at 09:10

## 2020-10-03 RX ADMIN — ACETAMINOPHEN 650 MG: 325 TABLET ORAL at 09:10

## 2020-10-03 RX ADMIN — PROPRANOLOL HYDROCHLORIDE 20 MG: 20 TABLET ORAL at 09:10

## 2020-10-03 RX ADMIN — ENOXAPARIN SODIUM 30 MG: 40 INJECTION SUBCUTANEOUS at 05:10

## 2020-10-03 RX ADMIN — CEFEPIME 1 G: 1 INJECTION, POWDER, FOR SOLUTION INTRAMUSCULAR; INTRAVENOUS at 05:10

## 2020-10-03 RX ADMIN — LOPERAMIDE HYDROCHLORIDE 2 MG: 1 SOLUTION ORAL at 12:10

## 2020-10-03 RX ADMIN — PROPRANOLOL HYDROCHLORIDE 20 MG: 20 TABLET ORAL at 05:10

## 2020-10-03 RX ADMIN — PHENOBARBITAL 100 MG: 20 ELIXIR ORAL at 09:10

## 2020-10-03 RX ADMIN — CEFEPIME 1 G: 1 INJECTION, POWDER, FOR SOLUTION INTRAMUSCULAR; INTRAVENOUS at 10:10

## 2020-10-03 RX ADMIN — IPRATROPIUM BROMIDE AND ALBUTEROL SULFATE 3 ML: .5; 2.5 SOLUTION RESPIRATORY (INHALATION) at 03:10

## 2020-10-03 RX ADMIN — GABAPENTIN 250 MG: 250 SOLUTION ORAL at 09:10

## 2020-10-03 RX ADMIN — MELATONIN TAB 3 MG 6 MG: 3 TAB at 09:10

## 2020-10-03 RX ADMIN — SODIUM CHLORIDE, SODIUM LACTATE, POTASSIUM CHLORIDE, AND CALCIUM CHLORIDE 500 ML: .6; .31; .03; .02 INJECTION, SOLUTION INTRAVENOUS at 05:10

## 2020-10-03 NOTE — CODE/ RAPID DOCUMENTATION
"RAPID RESPONSE NURSE NOTE     Admit Date: 2020  LOS: 13  Code Status: Full Code   Date of Consult: 10/03/2020  : 1995  Age: 24 y.o.  Weight:   Wt Readings from Last 1 Encounters:   10/02/20 32.7 kg (72 lb)     Sex: male  Race: White   Bed: 6086/6086 A:   MRN: [id number]  Time Rapid Response Team page Received: 1505   Time Rapid Response Team at Bedside: 1508  Time Rapid Response Team left Bedside: 1545  Was the patient discharged from an ICU this admission?   no  Was the patient discharged from a PACU within last 24 hours?  no  Did the patient receive conscious sedation/general anesthesia within last 24 hours?  no  Was the patient in the ED within the past 24 hours?  no  Was the patient started on NIPPV within the past 24 hours?  no  Did this progress into an ARC or CPA:  yes  Attending Physician: Greg Dillon MD  Primary Service: [facility] CRITICAL CARE MEDICINE TEAM 2  Consult Requested By: Greg Dillon MD     SITUATION     Notified by bedside RN via phone call.  Reason for alert: Resp distress  Called to evaluate the patient for Respiratory    BACKGROUND     Why is the patient in the hospital?: Sepsis    Patient has a past medical history of Acid reflux, Cerebral palsy, History of hip surgery, S/P percutaneous endoscopic gastrostomy (PEG) tube placement, and Seizures.    ASSESSMENT/INTERVENTIONS     BP (!) 145/88   Pulse (!) 131   Temp (!) 101.5 °F (38.6 °C)   Resp (!) 25   Ht 4' 9" (1.448 m)   Wt 32.7 kg (72 lb)   SpO2 99%   BMI 15.58 kg/m²     What did you find: Called to bedside for pt desat down to 82%. Upon arrival to bedside, pt on 15L NRB SO2 mid 80's. MD at bedside. RT NT suctioning. Pt with thick secretions, unable to clear on own, O2 sats not improving.     RECOMMENDATIONS     We recommend: CC called to bedside. Decision made to transfer pt to ICU. Immediately transferred pt to CMICU.      FOLLOW-UP/CONTINGENCY PLAN     Call the Rapid Response Nurse, Angel Talamantes RN at x [phone] for " additional questions or concerns.    PHYSICIAN ESCALATION     Orders received and case discussed with Lyubov Solorio NP.    Disposition: Tx in ICU bed 6086.

## 2020-10-03 NOTE — CARE UPDATE
Rapid Response Respiratory Therapy Proactive Rounding Note      Time of visit: 933    Code Status: Full Code   : 1995  Bed: 8073/8073 A:   MRN: [id number]    SITUATION     Evaluated patient for: Call    BACKGROUND     Patient has a past medical history of Acid reflux, Cerebral palsy, History of hip surgery, S/P percutaneous endoscopic gastrostomy (PEG) tube placement, and Seizures.    ASSESSMENT/INTERVENTIONS     Upon arrival in room patient found with Rapid Response Nurses in room with MD, RN's, and Primary RT. Patient was on 9L SFM with mild WOB. Patient suspected of aspiration. Breath sounds auscultated and found to be coarse and absent on the bottom left. Patient eventually weaned from SFM 9L per Geovany ESCOTO to 5L NC. Patient O2 saturations held true, repeat CXR ordered, and EEG ordered via Geovany ESCOTO due to  displayed neuro concerns. Patient tolerated move to NC fine and RN's voiced that they would wean as tolerated.    Pulse: 107 Respiratory rate: 30 Temperature: Temp: 99.5 °F (37.5 °C) BP: BP: (!) 134/92 SpO2: 95  Level of Consciousness: Level of Consciousness (AVPU): alert  Respiratory Effort: Respiratory Effort: Mild, Labored Expansion/Accessory Muscle Usage: Expansion/Accessory Muscles/Retractions: abdominal muscle use, accessory muscle use  All Lung Field Breath Sounds: All Lung Fields Breath Sounds: Anterior:, Lateral:, coarse, diminished  ZHANG Breath Sounds: absent, diminished  LLL Breath Sounds: diminished, absent  RUL Breath Sounds: coarse, diminished  RML Breath Sounds: coarse, diminished  RLL Breath Sounds: coarse, diminished  O2 Device/Concentration: 9L SFM  Most recent blood gas: No results for input(s): PH, PCO2, PO2, HCO3, POCSATURATED, BE in the last 72 hours.  NIPPV: No Surgical airway: No  ETCO2 monitored: ETCO2 (mmHg): 0 mmHg  Ambu at bedside: Ambu bag with the patient?: Yes, Adult Ambu    Current Respiratory Care Orders: (From admission, onward)  Start   Ordered   20 2030   Pulse Oximetry Continuous Continuous      09/28/20 1701   09/28/20 1348  Oxygen Continuous Continuous     Comments: Discontinue when Sp02 is greater than or equal to 95% of equal to Preop Sp02   References: Oxygen Titration Protocol   Question Answer Comment   Device type: Low flow    Device: Simple Face Mask    Titrate O2 per Oxygen Titration Protocol: Yes    Notify MD of: Inability to achieve desired SpO2        09/28/20 1348   09/20/20 2200  Chest physiotherapy TID 3 times daily (40 of 55 released)    Release   Question: Indications: Answer: ATELECTASIS PROPHYLAXIS    09/20/20 2126   09/20/20 2200  Mechanical cough assist device TID 3 times daily (40 of 55 released)    Release    09/20/20 2126   Unscheduled  Inhalation Treatment Q4H PRN Every 4 hours PRN (0 of 65795 released)    Release    09/21/20 0056   Unscheduled  ASP/SUCTION NASOTRACHEAL Q4H PRN Every 4 hours PRN (0 of 41162 released)    Release    09/21/20 1114   Unscheduled  ASP/SUCTION NASOTRACHEAL Q4H PRN Every 4 hours PRN (0 of 00841 released)    Release    09/28/20 1            RECOMMENDATIONS     We recommend: continual monitoring of patient Oxygenation and ventilation needs. Weaning as tolerated. Possible unit advancement if continual aspirations issues arise.    ESCALATION      Physician Escalation (Yes/No) Yes   Care discussed with: Geovany ESCOTO  Discussed plan of care primary RTWHITLEY RRT     FOLLOW-UP     Please call back the Rapid Response RT, Matt Palma, RRT at x [phone] for any questions or concerns.

## 2020-10-03 NOTE — PLAN OF CARE
Problem: Adult Inpatient Plan of Care  Goal: Plan of Care Review  Outcome: Ongoing, Progressing  Flowsheets (Taken 10/3/2020 1115)  Plan of Care Reviewed With: patient  Goal: Optimal Comfort and Wellbeing  Outcome: Ongoing, Progressing     Problem: Respiratory Compromise (Sepsis/Septic Shock)  Goal: Effective Oxygenation and Ventilation  Outcome: Ongoing, Progressing  Intervention: Promote Airway Secretion Clearance  Flowsheets (Taken 10/3/2020 1115)  Cough And Deep Breathing: unable to perform     Patient will be continuously weaned from O2  today as he can tolerate.

## 2020-10-03 NOTE — CARE UPDATE
Rapid Response Nurse Chart Check     Chart check completed, abnormal VS noted. Please call [phone] for further concerns or assistance.

## 2020-10-03 NOTE — CARE UPDATE
"RAPID RESPONSE NURSE PROACTIVE ROUNDING NOTE     Time of Visit: 930    Admit Date: 2020  LOS: 13  Code Status: Full Code   Date of Visit: 10/03/2020  : 1995  Age: 24 y.o.  Sex: male  Race: White  Bed: 8073/8073 A:   MRN: [id number]  Was the patient discharged from an ICU this admission? no   Was the patient discharged from a PACU within last 24 hours?  no  Did the patient receive conscious sedation/general anesthesia in last 24 hours?  no  Was the patient in the ED within the past 24 hours?  no  Was the patient started on NIPPV within the past 24 hours?  no  Attending Physician: Quiana Armenta MD  Primary Service: [facility] MED     ASSESSMENT     Notified by bedside RN via phone call.  Reason for alert: resp distress    Diagnosis: Sepsis    Abnormal Vital Signs: BP (!) 134/92 (BP Location: Right leg, Patient Position: Lying)   Pulse 101   Temp 99.5 °F (37.5 °C) (Axillary)   Resp (!) 32   Ht 4' 9" (1.448 m)   Wt 32.7 kg (72 lb)   SpO2 98%   BMI 15.58 kg/m²      Clinical Issues: Respiratory    Patient  has a past medical history of Acid reflux, Cerebral palsy, History of hip surgery, S/P percutaneous endoscopic gastrostomy (PEG) tube placement, and Seizures.      Called to bedside for desat to 76%.  Upon arrival to bedside, pt on 8L simple mask SO2 92%. Pt nonverbal at baseline, audible upper respiratory congestion, coarse lung sounds in right upper lobe, tachypneic with RR up to 40 bpm. Per bedside nurse and MD, pt appears drowsier than yesterday, also. CC fellow at bedside.      INTERVENTIONS/ RECOMMENDATIONS     RT at bedside, NT suction x2 passes with thick secretions. Repeat chest xray. Pt placed back on 5L NC SO2 93-95%. Spot EEG ordered. Suction as needed. Monitor VS.    Discussed plan of care with Vernell SIEGEL.    PHYSICIAN ESCALATION     Yes/No  yes    Orders received and case discussed with Dr. Armenta.    Disposition: Remain in room 8073.    FOLLOW-UP     Call back the Rapid Response " Nurse, Angel Talamantes RN at [phone] for additional questions or concerns.

## 2020-10-03 NOTE — SUBJECTIVE & OBJECTIVE
Past Medical History:   Diagnosis Date    Acid reflux     Cerebral palsy     History of hip surgery     S/P percutaneous endoscopic gastrostomy (PEG) tube placement     Seizures        Past Surgical History:   Procedure Laterality Date    CHOLECYSTECTOMY      FLEXIBLE SIGMOIDOSCOPY N/A 9/9/2020    Procedure: SIGMOIDOSCOPY, FLEXIBLE;  Surgeon: America Goode MD;  Location: [address]);  Service: Endoscopy;  Laterality: N/A;    HIP SURGERY         Review of patient's allergies indicates:  No Known Allergies    Family History     Problem Relation (Age of Onset)    Cancer Maternal Grandfather        Tobacco Use    Smoking status: Never Smoker    Smokeless tobacco: Never Used   Substance and Sexual Activity    Alcohol use: Never     Frequency: Never    Drug use: Not Currently    Sexual activity: Not on file      Review of Systems   Unable to perform ROS: Patient nonverbal     Objective:     Vital Signs (Most Recent):  Temp: (!) 101.5 °F (38.6 °C) (10/03/20 1748)  Pulse: (!) 131 (10/03/20 1700)  Resp: (!) 25 (10/03/20 1700)  BP: (!) 145/88 (10/03/20 1700)  SpO2: 99 % (10/03/20 1700) Vital Signs (24h Range):  Temp:  [98.4 °F (36.9 °C)-101.5 °F (38.6 °C)] 101.5 °F (38.6 °C)  Pulse:  [] 131  Resp:  [16-52] 25  SpO2:  [71 %-100 %] 99 %  BP: (129-152)/(82-92) 145/88   Weight: 32.7 kg (72 lb)  Body mass index is 15.58 kg/m².      Intake/Output Summary (Last 24 hours) at 10/3/2020 1853  Last data filed at 10/2/2020 2300  Gross per 24 hour   Intake 80 ml   Output 500 ml   Net -420 ml       Physical Exam  Vitals signs and nursing note reviewed.   Constitutional:       General: He is in acute distress.      Appearance: He is well-developed. He is cachectic. He is ill-appearing (Chronic).      Interventions: Face mask in place.   Eyes:      Conjunctiva/sclera: Conjunctivae normal.   Neck:      Vascular: No JVD.   Cardiovascular:      Rate and Rhythm: Regular rhythm. Tachycardia present.      Pulses:            Radial pulses are 2+ on the right side and 2+ on the left side.        Dorsalis pedis pulses are 1+ on the right side and 1+ on the left side.      Heart sounds: Normal heart sounds. No murmur. No friction rub. No gallop.    Pulmonary:      Effort: Tachypnea, accessory muscle usage and respiratory distress present.      Breath sounds: Rhonchi present. No wheezing.   Abdominal:      General: Abdomen is flat. Bowel sounds are normal. There is no distension.      Palpations: Abdomen is soft.      Tenderness: There is no abdominal tenderness. There is no guarding.      Comments: PEG tube in place   Genitourinary:     Comments: Condom cath in place   Musculoskeletal:      Right lower leg: No edema.      Left lower leg: No edema.   Skin:     Capillary Refill: Capillary refill takes less than 2 seconds.      Findings: No erythema or rash.   Psychiatric:         Speech: He is noncommunicative.     Vents:     Lines/Drains/Airways     Peripherally Inserted Central Catheter Line            PICC Double Lumen 09/28/20 1331 right cephalic 5 days          Drain                 Gastrostomy/Enterostomy 08/04/20 1653 Percutaneous endoscopic gastrostomy (PEG) feeding 60 days    Male External Urinary Catheter 09/27/20 1300 Small 6 days              Significant Labs:    CBC/Anemia Profile:  Recent Labs   Lab 10/02/20  0405 10/03/20  0517 10/03/20  1350   WBC 10.13 11.99 14.23*   HGB 9.3* 9.5* 9.1*   HCT 30.3* 31.1* 30.0*   * 735* 702*   MCV 97 98 98   RDW 14.6* 14.3 14.3        Chemistries:  Recent Labs   Lab 10/02/20  0404 10/03/20  0517    137   K 4.1 4.1    101   CO2 26 27   BUN 9 9   CREATININE 0.5 0.5   CALCIUM 8.9 9.1   ALBUMIN 2.1* 2.2*   PROT 7.0 7.1   BILITOT 0.1 0.2   ALKPHOS 91 89   ALT 10 9*   AST 15 11   MG 1.9 1.8   PHOS 2.9 2.9       All pertinent labs within the past 24 hours have been reviewed.    Significant Imaging: I have reviewed all pertinent imaging results/findings within the past 24  hours.

## 2020-10-03 NOTE — CARE UPDATE
Rapid Response Nurse Follow-up Note     Followed up with patient for proactive rounding.   No acute issues at this time. Pt appears more comfortable. Weaning O2. Currently on 2L NC maintaining sats. EEG tech called to hook pt up. Reviewed plan of care with bedside RNVernell.   Team will continue to follow.  Please call Rapid Response RN, Angel Talamantes RN with any questions or concerns at [phone].

## 2020-10-03 NOTE — PLAN OF CARE
NICOLA orientation. Spiked a temp of 101.1 at shift change. Administered PRN Tylenol and patient has been afebrile since. Patient remains tachycardic. All other vitals stable. Peptamen 1.5 infusing at goal rate of 40 cc/hr. Tolerating well with minimal residuals. Wound care complete. No acute needs at the moment. Bed in lowest position, call light and personal items within reach. Will continue to monitor.

## 2020-10-03 NOTE — PROGRESS NOTES
Progress Note   Hospital Medicine         Patient Name: Glen Moscoso  MRN:  [id number]  Hospital Medicine Team: [facility] [place] CRITICAL CARE MEDICINE TEAM 2 Quiana Armenta MD  Date of Admission:  9/20/2020     Length of Stay:  LOS: 13 days   Expected Discharge Date: 10/2/2020  Principal Problem:  Sepsis       Subjective:     Interval History/Overnight Events:    This AM on first exam around 815 am, was on RA, mild tachy, seemed to grimacy when pressing abdomen more than normal, obtained CXR and KUB to ensure no free air, were both okay. Had rapid response around 930 am with desats. At bedside with nsring and rapid team, on NRB and titrated back to NC with ICU fellwo bedside and rapid team and floor nursing, decision to keep on floor as transitioned to NC with sats holding okay. CXR delayed as apparently was cancelled by rads without notifying anyone, nursing to SOS. Resulted with infiltrates as suspected was aspiratio. Tube feeds held since this AM at first rapid. ID was cnosulted earlier in day to resassess as has had fevers the last 2 days, was considering PE study if persisted given had been tachy to mid 110s the last few days, CT abd/chest Wednesday was okay and free air previously had resolved. Has had rseistant klebsiella in sputum before and had extened treatment from earlier last month. Was only home 2 days between a lengthy admit for 3 weeks before returning here the fevers/pneumoperitoneum suspected from flex sig possbly. He was planing for ltach as he was here 2 weeks now an stable but had fever Friday and non accepting faility so plan to watch this weekend.   Was on antibiotics until 10/24 for aacral ulcers also, had permacath placed for this earlier in week. Had no isues with breathing since 2nd step down last admit about 5 weeks ago or so, has been on RA. Returned to room around 1 or so and he was calm but looked a little grey and off, nursing enrico at [facility] agreed. Obtained cbc, lactic acid which were okay  besides wbc, cxr consistent with aspiration on left side. Called mom to udpate her after leaving the room there, concerns he was going to be high likelihood decompsation, on phone with mom 20 min and page as off phone that he had de-sat again and on nrb. Returned for 2nd rapid and suctioning by rapid team with yellow thick sputum removed gaggin by patient, sats improved. Agitation and arm flailing and moaning, this is not his baseline as nonverbal but not ever like this in 6 weeks of having him on/off teams almost.   Had stridor issues on prev stay that would resolve with chin thrust manuervers, no stridor now but wet sounding lungs on repeats exams. Rapid team and icu to take to ICU now.  Called mom to update her, she was ill with pyelo and septic shock 3 weeks ago here and on my team also and had her nephrostomy tube removed yesterday by uro and was instructed she had to rest at home by them as usally she is at bedside every day with him in 2 weeks prior to this before she had admissino for septic shock herself and is still recovering. She is aware of his room number and plans and will be hre tomorrow        Review of Systems   Unable to assess secondary to condition.    Objective:     Temp:  [98.4 °F (36.9 °C)-101.3 °F (38.5 °C)]   Pulse:  []   Resp:  [16-52]   BP: (129-136)/(82-92)   SpO2:  [71 %-100 %]       Physical Exam:  Constitutional:ill, not at baseline, agitated uncomforatble, increased RR  Head: Normocephalic and atraumatic.   Mouth/Throat: Oropharynx is clear and moist.   Eyes: EOM are normal. Pupils are equal, round, and reactive to light. No scleral icterus.   Neck: Normal range of motion. Neck supple.   Cardiovascular: Normal rate and regular rhythm.  No murmur heard.  Pulmonary/Chest: increased RR, increased resp sounds, wet sounding lungs. resp distress. Right chest all permacath, covered in tegrederm.   Abdominal: Soft. Grimaces with deep palpation and agitation seen. Bowel sounds are  normal.  No distension or tenderness. PEG in place. Chronic redness mild.   Musculoskeletal: contractures in arms, legs.   Neurological: non communicative, baseline.  Skin: Skin is warm and dry.   Psychiatric: at baseline, noncommunicative.more agitated than baseline and in pain it seems    Recent Labs   Lab 09/29/20  0351 09/30/20  0417 10/01/20  0339 10/02/20  0405 10/03/20  0517 10/03/20  1350   WBC 10.94 12.87* 10.91 10.13 11.99 14.23*   HGB 9.2* 9.0* 9.6* 9.3* 9.5* 9.1*   HCT 30.4* 29.4* 31.2* 30.3* 31.1* 30.0*   * 667* 669* 710* 735* 702*     Recent Labs   Lab 10/01/20  0339 10/02/20  0404 10/03/20  0517    139 137   K 3.9 4.1 4.1    104 101   CO2 25 26 27   BUN 8 9 9   CREATININE 0.5 0.5 0.5   * 103 119*   CALCIUM 8.9 8.9 9.1   MG 1.9 1.9 1.8   PHOS 3.0 2.9 2.9     Recent Labs   Lab 10/01/20  0339 10/02/20  0404 10/03/20  0517   ALKPHOS 107 91 89   ALT 12 10 9*   AST 16 15 11   ALBUMIN 2.3* 2.1* 2.2*   PROT 7.2 7.0 7.1   BILITOT 0.1 0.1 0.2     Recent Labs   Lab 09/30/20  1104   POCTGLUCOSE 160*        albuterol-ipratropium  3 mL Nebulization Q4H    ascorbic acid (vitamin C)  500 mg Per G Tube Daily    baclofen  10 mg Per G Tube TID    ceFEPime (MAXIPIME) IVPB  1 g Intravenous Q8H    enoxaparin  30 mg Subcutaneous Q24H    gabapentin  250 mg Per G Tube QHS    lactated ringers  500 mL Intravenous Once    Lactobacillus rhamnosus GG  1 capsule Per G Tube Daily    loperamide  2 mg Per G Tube QID    metroNIDAZOLE  500 mg Per G Tube TID    multivitamin liquid no.118  10 mL Per G Tube Daily    PHENobarbitaL  100 mg Per G Tube QHS    propranoloL  20 mg Per G Tube TID    vancomycin (VANCOCIN) IVPB  1,250 mg Intravenous Q12H       Assessment and Plan     Mr. Glen Moscoso is a 24 y.o. male who presented to [facility] on 9/20/2020 with     Sepsis  Pneumoperitoneum  Colitis  Aspiration pneumonia  -has had resistant pneumonia about 5 -6 weeks ago persisting in sputum serratia to  rocephin, had to do zosyn, upgraded on this admit for osteo (See below). Known to ID service well, consulted again 10/3 for persistent fevers, AM CXR okay but now with aspiration after events of today. UA ordered to assess this for the feers present prior to aspiratio issue. Was considering PE study if persisted as also had tachy, may have had small aspiration issues prior and ten today hit over. Would ensure cover resistant serratia if worsens  -fever again after 48 hours fever free overnight to 100.7 x 1. WBC stable at 10 still, CRP 58, procal neg. Recently imaged without signs of infection. HR low 100s. Will trend, given had fevers last admit and dc with very quick readmit will cautiously watch over weekend even if LTAC ready, low threshold ID consult again if persists.   · Patient was afebrile with stable WBC with resolution of sepsis until patient had temp of 100.9 F and slight increase in WBC to 12 o 9/30--> iproved to 10 now, afebrile overnight, CT chset/abd with no signs of pneumonia, no signs of any worsening infections, pneumoperitoneum resolved. Continue broad spectrum per ID recs, has permacath for antibitoics given difficult IV access from contractures  · LTAC and CM/SW working on placement for patient for continued medical care.   · General surgery consulted on admit for pneumoperitoneum seen on admit CT scan of abdomen and pelvis. General surgery felt likely contained leak and very small bowel perforation related to recent colitis. Due to patient's very poor functional level at baseline and malnutrition felt to be very poor surgical candidate and recommended conservative.  · ID consulted and recommended IV Vancomycin, Cefepime and Flagyl to treat both small perforation and osteomyelitis of pelvic area and to extend abx until 10/22. IR to place tunneled catheter on 9/28 for long term antibiotics as current midline cannot be extended that long in future and bedside PICC team unable to place bedside PICC  "due to severe arm contractures.   · ID recommends repeat CT scan of abdomen to re-evaluate perforation in 2-3 weeks or sooner if any acute worsening (done on 9/30 and improved)    ACute hypoxemic respiratory failure  Aspiration pneumonia  -rapid response and episode on 10/3 x 2  -CXR infiltrates  -required NRB, suctioning  -to ICU for this, see interval hx           Decubitus ulcer of ischial area, left, stage IV  Pressure injury of right ischium, stage 4  Osteomyelitis of pelvis  · Controlled. Wounds are improving. Continue current wound care (following in house known well to their team)  · Wounds appear clean with no definite signs of infection.   · As per wound care "Nursing to cleanse scrotal skin breakdown and R and L ischial pressure injuries with wound cleanser. Pack ischial pressure injuries with Triad ointment and gauze, then cover bordered foam dressing to prove autolytic debridement and prevent breakdown of intact skin. Apply Triad ointment over scrotal skin breakdown."  · Patient now with exposed bone to ischial areas and ID with concern for underlying osteomyelitis even though wounds themselves do not appear infected so recommend extension of long term abx with IV Vancomycin, Cefepime and po Flagyl until at least 10/22. IR placed tunneled line on 9/28 for long term antibiotics to right chest wall.      Severe protein-calorie malnutrition  · Present on admit and related to colitis, recent medical illnesses. Patient with PEG and receiving TF with Prebio to help with nutrition. Likely cause of poor wound healing and development of ischial ulcers.   · Nutrition consulted and following and continue Peptamen with Prebio at 40 mL/hr as per nutrition recs to treat malnutrition. at goal now on feeds  10/1.        Seizure disorder  Chronic and controlled and continue Phenobarbital to treat.   -concern may be having subclinical seizures now with roving eye motions not typical baseline seen today 10/3 leading to " aspiration, spot EEG ordered. Recent NCC admit x 2 for suspected status (home 2 days before return)        Anemia of chronic disease  Chronic and controlled and related to chronic infection, poor nutrition and poor baseline function status. Monitor anemia with daily CBC and transfuse if Hgb < 7.   -stable at mid 9's now. Required 1 tx on last admit and stable since        Hypophosphatemia  · Replace PRN     Cerebral palsy  Chronic condition. Patient with poor baseline functional status and non-communicative and bed bound/wheelchair bound with extremity contractures to all extremities at baseline.   Requires 24/7 care at home by mom who is very involved in his care at baseline    Tachycardia  -chronic due to CP and reflex tachy issues, on home BB  -given sedentary more than usual in hospital now and fevers, will check D dimer  (1..0) and repeat echo for any signs for PE. Had UE LE 2 weeks ago negative for fever. Has had superficial clots in UE in last month from lines  -if fever and tachy persist, will check CTA this weekend. Will resume at least px lovenox now.  -wells score of 3, moderate risk.  10/3: possible aspiration causing this the last 2 days, unclear, is still tachy but had events today for this also      Diet:  Holding all  DVT PPx:  Teds/scds. lovenox. If signs of PE persist, will check further.      Disposition:  To ICU for decompeastion

## 2020-10-03 NOTE — ASSESSMENT & PLAN NOTE
Pt is a 23 yo male with CP with recent admit for respiratory failure and pseudomonas/serratia PNA, Ischial ulcers, chronic diarrhea s/p flex sig on last admit without significant findings completing 14d of zosyn for PNA and soft tissue wounds readmitted with fever.  Pt broadened to Vanc cefepime and flagyl.  CT imaging showed-New small volume pneumoperitoneum.  Source was suspected from intestinal perforation possibly Secondary to flex sig. Pt seen by gen sx  without intervention.  Wounds did have exposed bone but did not appear infected. WBC and fever normalized.  ID saw the pt and recommended to continue broad therapy of Vanc, cefepime and flagyl to cover for prior PNA cx, wound osteo, and GI perf for 4 weeks (est end date 10/22) given osteomyelitis and pt had already completed 2 wks of zosyn    Pt began to develop F again on 9/29.  Repeat CT done which showed mild circumferential wall thickening of the ascending colon and rectosigmoid similar most recent exam as well as interval resolution of pneumoperitoneum.    ID reconsulted given F.  White count WNL.  Procal wnl.  CRP 56.8 (downtrendind- 9/.5).  Chest x ray and abd x ray w/o acute finding.  PICC placed 9/28      Plan:  - Continue broad coverage of- Vanc, cefepime and flagyl   - Pt initially on room air, acute worsening of resp status today.  Stepped up to ICU.  Concern for aspiration.  Likely source of F- pneumonia vs pneumonitis.  Hx of Serratia and Pseudomonas from resp cxs- covered by Cefepime  - Recommend blood cxs and resp cxs- will follow  - Recommend bl brennan US  - Pt case and abx regimen discussed with critical care team and ID staff  - ID will continue to follow.

## 2020-10-03 NOTE — CARE UPDATE
Rapid Response Respiratory Therapy Note      Code Status: Full Code   : 1995  Bed: 6086/6086 A:   MRN: [id number]  Time page Received: 1429  Time Rapid Response RT at Bedside: 1426  Time Rapid Response RT left Bedside: 1505  Report given to: KODY Lee RRT    SITUATION     Evaluated patient for: Rapid Response    BACKGROUND     Patient has a past medical history of Acid reflux, Cerebral palsy, History of hip surgery, S/P percutaneous endoscopic gastrostomy (PEG) tube placement, and Seizures.    ASSESSMENT/INTERVENTIONS     Upon arrival in room patient found on NRB 15L with coarse crackles breath sounds bilaterally and O2 saturations of 71%. Patient NT suctioned and produced large/thick/white-yellow secretions.Patient bagged via Ambu bag in order to facilitate oxygenation due to continual desaturation. Upon bagging, patient demonstrated a productive cough and O2 saturations started to rise. Patient NT suctioned 3x with bagging intermittently. O2 saturations increased to 94% and patient placed back on NRB 15L. [facility] NP ordred CPT for 3min on patient. Patient left on NRB and determined by [facility] NP to advance to [facility] 6086. Upon arrival to room patient was given a duoneb treatment per [facility] NP order    Pulse: 131 Respiratory rate: 30 BP: BP: (!) 129/90 SpO2:71  Level of Consciousness: Level of Consciousness (AVPU): alert  Respiratory Effort: Respiratory Effort: Moderate, Labored  Expansion/Accessory Muscle Usage: Expansion/Accessory Muscles/Retractions: abdominal muscle use, accessory muscle use  All Lung Field Breath Sounds: All Lung Fields Breath Sounds: Anterior:, Lateral:, coarse, crackles, crackles, coarse  ZHANG Breath Sounds: coarse, crackles, crackles, coarse  LLL Breath Sounds: coarse, crackles, crackles, coarse  RUL Breath Sounds: coarse, crackles, crackles, coarse  RML Breath Sounds: coarse, crackles, crackles, coarse  RLL Breath Sounds: coarse, crackles, crackles, coarse  O2  Device/Concentration: 15L NRB  Most recent blood gas:   Recent Labs     10/03/20  1350   LACTATE 0.6     Current Respiratory Care Orders:   Start   Ordered   10/03/20 2200  Chest physiotherapy TID 3 times daily (1 of 10 released)    Release   Question: Indications: Answer: COPIOUS SPUTUM PRODUCTION    10/03/20 1452   10/03/20 1453  Inhalation Treatment Q4H Every 4 hours (3 of 20 released)    Release    10/03/20 1452   09/28/20 1702  Pulse Oximetry Continuous Continuous      09/28/20 1701   09/28/20 1348  Oxygen Continuous Continuous     Comments: Discontinue when Sp02 is greater than or equal to 95% of equal to Preop Sp02   References: Oxygen Titration Protocol   Question Answer Comment   Device type: Low flow    Device: Simple Face Mask    Titrate O2 per Oxygen Titration Protocol: Yes    Notify MD of: Inability to achieve desired SpO2        09/28/20 1348   09/20/20 2200  Chest physiotherapy TID 3 times daily (40 of 55 released)    Release   Question: Indications: Answer: ATELECTASIS PROPHYLAXIS    09/20/20 2126   09/20/20 2200  Mechanical cough assist device TID 3 times daily (40 of 55 released)    Release    09/20/20 2126   Unscheduled  Inhalation Treatment Q4H PRN Every 4 hours PRN (0 of 28142 released)    Release    09/21/20 0056   Unscheduled  ASP/SUCTION NASOTRACHEAL Q4H PRN Every 4 hours PRN (0 of 47731 released)    Release    09/21/20 1114   Unscheduled  ASP/SUCTION NASOTRACHEAL Q4H PRN Every 4 hours PRN (0 of 22488 released)    Release    09/28/20 1632       NIPPV: No Surgical airway: No Vent: No  ETCO2 monitored: ETCO2 (mmHg): 0 mmHg  Ambu at bedside: Ambu bag with the patient?: Yes, Adult Ambu    RECOMMENDATIONS   ?  We recommend: Continual monitoring of patient oxygen and respiratory requirements. Titration as needed. NT suctioned as indicated. Consideration of nasal trumpet for repeated NT suctioning encounters.    ESCALATION      Discussed plan of care with Osmin FITZPATRICK and primary RT, WHITLEY Mcclure RRT.      FOLLOW-UP     Disposition: Tx in ICU bed [facility] 6086.    Please call back the Rapid Response RT, Matt Palma, RRT at x [phone] for any questions or concerns.

## 2020-10-03 NOTE — SUBJECTIVE & OBJECTIVE
Past Medical History:   Diagnosis Date    Acid reflux     Cerebral palsy     History of hip surgery     S/P percutaneous endoscopic gastrostomy (PEG) tube placement     Seizures        Past Surgical History:   Procedure Laterality Date    CHOLECYSTECTOMY      FLEXIBLE SIGMOIDOSCOPY N/A 9/9/2020    Procedure: SIGMOIDOSCOPY, FLEXIBLE;  Surgeon: America Goode MD;  Location: [address]);  Service: Endoscopy;  Laterality: N/A;    HIP SURGERY         Review of patient's allergies indicates:  No Known Allergies    Medications:  Medications Prior to Admission   Medication Sig    ascorbic acid, vitamin C, (VITAMIN C) 500 MG tablet 1 tablet (500 mg total) by Per G Tube route once daily.    diphenhydrAMINE (BENADRYL) 12.5 mg/5 mL elixir 10 mLs (25 mg total) by Per G Tube route 4 (four) times daily as needed for Allergies.    gabapentin (NEURONTIN) 250 mg/5 mL solution 5 mLs (250 mg total) by Per G Tube route nightly. At bedtime    multivitamin liquid no.118 Liqd 10 mLs by Per G Tube route once daily.    [DISCONTINUED] baclofen (LIORESAL) 10 MG tablet 1 tablet (10 mg total) by Per G Tube route 3 (three) times daily.    [DISCONTINUED] cholestyramine (QUESTRAN) 4 gram packet Take 1 packet (4 g total) by mouth 2 (two) times daily.    [DISCONTINUED] ibuprofen (ADVIL,MOTRIN) 100 mg/5 mL suspension 10 mLs (200 mg total) by Per G Tube route every 6 (six) hours as needed for Pain or Temperature greater than.    [DISCONTINUED] Lactobacillus rhamnosus GG (CULTURELLE) 10 billion cell capsule 1 capsule by Per G Tube route once daily.    [DISCONTINUED] loperamide (IMODIUM) 1 mg/7.5 mL solution 15 mLs (2 mg total) by Per G Tube route 4 (four) times daily. for 10 days    [DISCONTINUED] nystatin (MYCOSTATIN) cream Apply topically 2 (two) times daily.    [DISCONTINUED] PHENobarbitaL 20 mg/5 mL (4 mg/mL) Elix elixir Take 25 mLs (100 mg total) by mouth every evening.    [DISCONTINUED] piperacillin sodium/tazobactam  (PIPERACILLIN-TAZOBACTAM 4.5G/100ML SODIUM CHLORIDE 0.9%-READY TO MIX) Inject 300 mLs (13.5 g total) into the vein continuous. End date 9/24/2020. for 9 days    [DISCONTINUED] propranoloL (INDERAL) 20 MG tablet 1 tablet (20 mg total) by Per G Tube route 3 (three) times daily.     Antibiotics (From admission, onward)    Start     Stop Route Frequency Ordered    10/02/20 2200  vancomycin 1.25 g in dextrose 5% 250 mL IVPB (ready to mix)      -- IV Every 12 hours (non-standard times) 10/02/20 1117    09/29/20 1400  ceFEPIme injection 1 g      -- IV Every 8 hours (non-standard times) 09/29/20 1104    09/23/20 1500  metroNIDAZOLE tablet 500 mg      -- PER G TUBE 3 times daily 09/23/20 1033        Antifungals (From admission, onward)    None        Antivirals (From admission, onward)    None             There is no immunization history on file for this patient.    Family History     Problem Relation (Age of Onset)    Cancer Maternal Grandfather        Social History     Socioeconomic History    Marital status: Single     Spouse name: Not on file    Number of children: Not on file    Years of education: Not on file    Highest education level: Not on file   Occupational History    Not on file   Social Needs    Financial resource strain: Not on file    Food insecurity     Worry: Not on file     Inability: Not on file    Transportation needs     Medical: Not on file     Non-medical: Not on file   Tobacco Use    Smoking status: Never Smoker    Smokeless tobacco: Never Used   Substance and Sexual Activity    Alcohol use: Never     Frequency: Never    Drug use: Not Currently    Sexual activity: Not on file   Lifestyle    Physical activity     Days per week: Not on file     Minutes per session: Not on file    Stress: Not on file   Relationships    Social connections     Talks on phone: Not on file     Gets together: Not on file     Attends Scientologist service: Not on file     Active member of club or organization:  Not on file     Attends meetings of clubs or organizations: Not on file     Relationship status: Not on file   Other Topics Concern    Not on file   Social History Narrative    Not on file     Review of Systems   Unable to perform ROS: Patient nonverbal     Objective:     Vital Signs (Most Recent):  Temp: 98.5 °F (36.9 °C) (10/03/20 1138)  Pulse: 103 (10/03/20 1138)  Resp: (!) 27 (10/03/20 1100)  BP: (!) 129/90 (10/03/20 1100)  SpO2: (!) 94 % (10/03/20 1138) Vital Signs (24h Range):  Temp:  [98.4 °F (36.9 °C)-101.1 °F (38.4 °C)] 98.5 °F (36.9 °C)  Pulse:  [] 103  Resp:  [17-38] 27  SpO2:  [76 %-100 %] 94 %  BP: (127-141)/(81-92) 129/90     Weight: 32.7 kg (72 lb)  Body mass index is 15.58 kg/m².    Estimated Creatinine Clearance: 107.2 mL/min (based on SCr of 0.5 mg/dL).    Physical Exam  Vitals signs and nursing note reviewed.   Constitutional:       General: He is not in acute distress.     Appearance: He is well-developed. He is cachectic. He is ill-appearing (Chronic).   Eyes:      Conjunctiva/sclera: Conjunctivae normal.   Neck:      Vascular: No JVD.   Cardiovascular:      Rate and Rhythm: Normal rate and regular rhythm.      Heart sounds: Normal heart sounds. No murmur. No friction rub. No gallop.    Pulmonary:      Effort: Pulmonary effort is normal. No respiratory distress.      Breath sounds: No wheezing.      Comments: On NC  Abdominal:      General: Abdomen is flat. Bowel sounds are normal. There is no distension.      Palpations: Abdomen is soft.      Tenderness: There is no abdominal tenderness. There is no guarding.      Comments: PEG tube in place   Skin:     Capillary Refill: Capillary refill takes less than 2 seconds.      Findings: No erythema or rash.   Psychiatric:         Speech: He is noncommunicative.         Behavior: Behavior is cooperative.         Significant Labs: All pertinent labs within the past 24 hours have been reviewed.    Significant Imaging: I have reviewed all pertinent  imaging results/findings within the past 24 hours.

## 2020-10-03 NOTE — NURSING
Around 0900, call received from telemetry stating that patient's SpO2 was 76%. Primary RN went to the room to assess the patient. Patient appeared to be breathing tachypnic. SpO2 probe was re-positioned, and SpO2 read to 88%. Supplemental O2 was administered, and RT was contacted. Upon RT assessment, patient had recovered to 96% on 5L simple face mask, and was weaned to 3L NC. Patient maintained SpO2 of 95%. However, few minutes later, primary RN noticed that patient started to de-sat to 76-88% of SpO2 on 3L NC, and simple face mask was applied again at 5L. RT, RRT RNs, and MD were notified. Patient received NT suction, and OT suction by RT with RRT RNs, and MD at bedside, but SpO2 did not improve drastically. Patient's oxygen requirement increased, and RT increased his oxygen to 9L simple mask. ICU fellow was contacted, and came to the bedside to assess the patient. Per ICU fellow, she recommended EEG as patient demonstrates abnormal gazing. Patient started to recover on his SpO2, and was weaned to 5L NC with his medical team at the bedside. Patient maintained his SpO2 greater than 92% on NC. Primary RN has been instructed to continue to monitor the patient, and to hold his TF.

## 2020-10-03 NOTE — CONSULTS
[facility]  Infectious Disease  Consult Note    Patient Name: Glen Moscoso  MRN: [id number]  Admission Date: 9/20/2020  Hospital Length of Stay: 13 days  Attending Physician: Greg Dillon MD  Primary Care Provider: Yadi Lawson MD     Isolation Status: No active isolations    Patient information was obtained from past medical records and ER records.      Inpatient consult to Infectious Diseases  Consult performed by: Rocco Szymanski PA-C  Consult ordered by: Quiana Armenta MD        Assessment/Plan:     Fever  Pt is a 25 yo male with CP with recent admit for respiratory failure and pseudomonas/serratia PNA, Ischial ulcers, chronic diarrhea s/p flex sig on last admit without significant findings completing 14d of zosyn for PNA and soft tissue wounds readmitted with fever.  Pt broadened to Vanc cefepime and flagyl.  CT imaging showed-New small volume pneumoperitoneum.  Source was suspected from intestinal perforation possibly Secondary to flex sig. Pt seen by gen sx  without intervention.  Wounds did have exposed bone but did not appear infected. WBC and fever normalized.  ID saw the pt and recommended to continue broad therapy of Vanc, cefepime and flagyl to cover for prior PNA cx, wound osteo, and GI perf for 4 weeks (est end date 10/22) given osteomyelitis and pt had already completed 2 wks of zosyn    Pt began to develop F again on 9/29.  Repeat CT done which showed mild circumferential wall thickening of the ascending colon and rectosigmoid similar most recent exam as well as interval resolution of pneumoperitoneum.    ID reconsulted given F.  White count WNL.  Procal wnl.  CRP 56.8 (downtrendind- 9/.5).  Chest x ray and abd x ray w/o acute finding.  PICC placed 9/28      Plan:  - Continue broad coverage of- Vanc, cefepime and flagyl   - Pt initially on room air, acute worsening of resp status today.  Stepped up to ICU.  Concern for aspiration.  Likely source of F-  pneumonia vs pneumonitis.  Hx of Serratia and Pseudomonas from resp cxs- covered by Cefepime  - Recommend blood cxs and resp cxs- will follow  - Recommend bl le US  - Pt case and abx regimen discussed with critical care team and ID staff  - ID will continue to follow.        Thank you for your consult. I will follow-up with patient. Please contact us if you have any additional questions.    Rocco Szymanski PA-C  Infectious Disease  [facility]    Subjective:     Principal Problem: Sepsis    HPI: Mr. Glen Moscoso is a 24 y.o. male with CP, complicated by seizures and a peg tube, who presents to the ER for evaluation of fever.  He just had [place] and [place] [facility] [place] for seizures and pneumonia.  He was originally admitted to [facility] with respiratory failure, where he was found to have Pseudomonas and Serratia pneumonia, as well as a decubitus ulcer that was debrided by Gen Surg on 9/7 - cultures were not sent.  He had repeat resp cultures that showed Serratia.  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  Skeletal structures otherwise intact without evidence of erosive change. He was followed by ID.  He was discharged home 9/18 on Zosyn via LUE PICC until 9/24 (to complete 2 weeks).      Family at bedside on admission reported he was doing well until the day of admission when he started to have fevers up to 101.2F.  They denied any new respiratory complaints.  Given his recent hospitalization and fever while on antibiotics, they brought him to the ER for further evaluation.     Upon arrival to the ER, vitals were temp 101.2F, , RR 24 and /75.  Labs showed WBC 17 with ESR 90 and Procal 0.56.  CXR didn't show a new consolidation.  His case was discussed with ID on call, who suggested Vanc, Cefepime,  and Flagyl based on his previous cultures, as well as CT chest/abdomen/pelvis to include sacral wounds.  He was admitted to Hospital Medicine for further management and ID now consulted for fever despite being on ABX and no clears source.    Interval History:  Patient has undergone a CT CAP and colitis, a small amount of pneumoperitoneum found likely from perforation, chronic changes of the pelvic bones, and mild retained secretions in the left mainstem bronchus with artial opacification of the left lower lobe bronchus, but no significant airspace disease in the left lower lobe.  .  Primary team has G tube check and no extravasation seen and case discussed with Gen Sx who felt patient jhonathan had small perforation and would monitor clinically.  Febrile to 101.2 on admit and now afebrile.  WBC 16 on admit now 11. Blood cultures NGTD.        Past Medical History:   Diagnosis Date    Acid reflux     Cerebral palsy     History of hip surgery     S/P percutaneous endoscopic gastrostomy (PEG) tube placement     Seizures        Past Surgical History:   Procedure Laterality Date    CHOLECYSTECTOMY      FLEXIBLE SIGMOIDOSCOPY N/A 9/9/2020    Procedure: SIGMOIDOSCOPY, FLEXIBLE;  Surgeon: America Goode MD;  Location: [address]);  Service: Endoscopy;  Laterality: N/A;    HIP SURGERY         Review of patient's allergies indicates:  No Known Allergies    Medications:  Medications Prior to Admission   Medication Sig    ascorbic acid, vitamin C, (VITAMIN C) 500 MG tablet 1 tablet (500 mg total) by Per G Tube route once daily.    diphenhydrAMINE (BENADRYL) 12.5 mg/5 mL elixir 10 mLs (25 mg total) by Per G Tube route 4 (four) times daily as needed for Allergies.    gabapentin (NEURONTIN) 250 mg/5 mL solution 5 mLs (250 mg total) by Per G Tube route nightly. At bedtime    multivitamin liquid no.118 Liqd 10 mLs by Per G Tube route once daily.    [DISCONTINUED] baclofen (LIORESAL) 10 MG tablet 1 tablet (10 mg  total) by Per G Tube route 3 (three) times daily.    [DISCONTINUED] cholestyramine (QUESTRAN) 4 gram packet Take 1 packet (4 g total) by mouth 2 (two) times daily.    [DISCONTINUED] ibuprofen (ADVIL,MOTRIN) 100 mg/5 mL suspension 10 mLs (200 mg total) by Per G Tube route every 6 (six) hours as needed for Pain or Temperature greater than.    [DISCONTINUED] Lactobacillus rhamnosus GG (CULTURELLE) 10 billion cell capsule 1 capsule by Per G Tube route once daily.    [DISCONTINUED] loperamide (IMODIUM) 1 mg/7.5 mL solution 15 mLs (2 mg total) by Per G Tube route 4 (four) times daily. for 10 days    [DISCONTINUED] nystatin (MYCOSTATIN) cream Apply topically 2 (two) times daily.    [DISCONTINUED] PHENobarbitaL 20 mg/5 mL (4 mg/mL) Elix elixir Take 25 mLs (100 mg total) by mouth every evening.    [DISCONTINUED] piperacillin sodium/tazobactam (PIPERACILLIN-TAZOBACTAM 4.5G/100ML SODIUM CHLORIDE 0.9%-READY TO MIX) Inject 300 mLs (13.5 g total) into the vein continuous. End date 9/24/2020. for 9 days    [DISCONTINUED] propranoloL (INDERAL) 20 MG tablet 1 tablet (20 mg total) by Per G Tube route 3 (three) times daily.     Antibiotics (From admission, onward)    Start     Stop Route Frequency Ordered    10/02/20 2200  vancomycin 1.25 g in dextrose 5% 250 mL IVPB (ready to mix)      -- IV Every 12 hours (non-standard times) 10/02/20 1117    09/29/20 1400  ceFEPIme injection 1 g      -- IV Every 8 hours (non-standard times) 09/29/20 1104    09/23/20 1500  metroNIDAZOLE tablet 500 mg      -- PER G TUBE 3 times daily 09/23/20 1033        Antifungals (From admission, onward)    None        Antivirals (From admission, onward)    None             There is no immunization history on file for this patient.    Family History     Problem Relation (Age of Onset)    Cancer Maternal Grandfather        Social History     Socioeconomic History    Marital status: Single     Spouse name: Not on file    Number of children: Not on file     Years of education: Not on file    Highest education level: Not on file   Occupational History    Not on file   Social Needs    Financial resource strain: Not on file    Food insecurity     Worry: Not on file     Inability: Not on file    Transportation needs     Medical: Not on file     Non-medical: Not on file   Tobacco Use    Smoking status: Never Smoker    Smokeless tobacco: Never Used   Substance and Sexual Activity    Alcohol use: Never     Frequency: Never    Drug use: Not Currently    Sexual activity: Not on file   Lifestyle    Physical activity     Days per week: Not on file     Minutes per session: Not on file    Stress: Not on file   Relationships    Social connections     Talks on phone: Not on file     Gets together: Not on file     Attends Taoism service: Not on file     Active member of club or organization: Not on file     Attends meetings of clubs or organizations: Not on file     Relationship status: Not on file   Other Topics Concern    Not on file   Social History Narrative    Not on file     Review of Systems   Unable to perform ROS: Patient nonverbal     Objective:     Vital Signs (Most Recent):  Temp: 98.5 °F (36.9 °C) (10/03/20 1138)  Pulse: 103 (10/03/20 1138)  Resp: (!) 27 (10/03/20 1100)  BP: (!) 129/90 (10/03/20 1100)  SpO2: (!) 94 % (10/03/20 1138) Vital Signs (24h Range):  Temp:  [98.4 °F (36.9 °C)-101.1 °F (38.4 °C)] 98.5 °F (36.9 °C)  Pulse:  [] 103  Resp:  [17-38] 27  SpO2:  [76 %-100 %] 94 %  BP: (127-141)/(81-92) 129/90     Weight: 32.7 kg (72 lb)  Body mass index is 15.58 kg/m².    Estimated Creatinine Clearance: 107.2 mL/min (based on SCr of 0.5 mg/dL).    Physical Exam  Vitals signs and nursing note reviewed.   Constitutional:       General: He is not in acute distress.     Appearance: He is well-developed. He is cachectic. He is ill-appearing (Chronic).   Eyes:      Conjunctiva/sclera: Conjunctivae normal.   Neck:      Vascular: No JVD.   Cardiovascular:       Rate and Rhythm: Normal rate and regular rhythm.      Heart sounds: Normal heart sounds. No murmur. No friction rub. No gallop.    Pulmonary:      Effort: Pulmonary effort is normal. No respiratory distress.      Breath sounds: No wheezing.      Comments: On NC  Abdominal:      General: Abdomen is flat. Bowel sounds are normal. There is no distension.      Palpations: Abdomen is soft.      Tenderness: There is no abdominal tenderness. There is no guarding.      Comments: PEG tube in place   Skin:     Capillary Refill: Capillary refill takes less than 2 seconds.      Findings: No erythema or rash.   Psychiatric:         Speech: He is noncommunicative.         Behavior: Behavior is cooperative.         Significant Labs: All pertinent labs within the past 24 hours have been reviewed.    Significant Imaging: I have reviewed all pertinent imaging results/findings within the past 24 hours.

## 2020-10-03 NOTE — HPI
Mr. Glen Moscoso is a 24 y.o. male with CP, complicated by seizures and a peg tube, who presents to the ER for evaluation of fever.  He just had a prolonged and complex hospital stay at [facility] from 8/26-9/18 for seizures and pneumonia.  He was originally admitted to [facility], where he was found to have Pseudomonas and Serratia pneumonia, as well as a decubitus ulcer that was debrided by Gen Surg on 9/7 - cultures were not sent.  He was discharged home 9/18 on Zosyn via LUE PICC until 9/24.  Family at bedside reports he was doing well until the day of admission when he started to have fevers up to 101.2F.  They deny any new respiratory complaints. Given his recent hospitalization and fever while on antibiotics, they brought him to the ER for further evaluation.     Upon arrival to the ER, vitals were temp 101.2F, , RR 24 and /75.  Labs showed WBC 17 with ESR 90 and Procal 0.56.  CT abdomen with pneumoperitoneum, suspect related to a small colonic perforation. Evaluated by surgery and managed conservatively with bowel rest and IV antibiotics.     Rapid Response on 10/3 for respiratory distress, likely aspiration event. Required nonrebreather to maintain saturations >90%. Increased WOB with audible secretions that patient was unable to clear. Transferred to ICU. Respiratory status improved with duonebs, CPT, and NT suction. CXR with left sided airspace disease. Weaned down to 2L NC.

## 2020-10-04 LAB
ALBUMIN SERPL BCP-MCNC: 2.2 G/DL (ref 3.5–5.2)
ALP SERPL-CCNC: 89 U/L (ref 55–135)
ALT SERPL W/O P-5'-P-CCNC: 8 U/L (ref 10–44)
ANION GAP SERPL CALC-SCNC: 11 MMOL/L (ref 8–16)
AST SERPL-CCNC: 12 U/L (ref 10–40)
BACTERIA #/AREA URNS AUTO: ABNORMAL /HPF
BASOPHILS # BLD AUTO: 0.05 K/UL (ref 0–0.2)
BASOPHILS NFR BLD: 0.3 % (ref 0–1.9)
BILIRUB SERPL-MCNC: 0.2 MG/DL (ref 0.1–1)
BILIRUB UR QL STRIP: NEGATIVE
BUN SERPL-MCNC: 8 MG/DL (ref 6–20)
CALCIUM SERPL-MCNC: 8.8 MG/DL (ref 8.7–10.5)
CHLORIDE SERPL-SCNC: 102 MMOL/L (ref 95–110)
CLARITY UR REFRACT.AUTO: ABNORMAL
CO2 SERPL-SCNC: 26 MMOL/L (ref 23–29)
COLOR UR AUTO: ABNORMAL
CREAT SERPL-MCNC: 0.4 MG/DL (ref 0.5–1.4)
DIFFERENTIAL METHOD: ABNORMAL
EOSINOPHIL # BLD AUTO: 0 K/UL (ref 0–0.5)
EOSINOPHIL NFR BLD: 0 % (ref 0–8)
ERYTHROCYTE [DISTWIDTH] IN BLOOD BY AUTOMATED COUNT: 13.9 % (ref 11.5–14.5)
EST. GFR  (AFRICAN AMERICAN): >60 ML/MIN/1.73 M^2
EST. GFR  (NON AFRICAN AMERICAN): >60 ML/MIN/1.73 M^2
GLUCOSE SERPL-MCNC: 99 MG/DL (ref 70–110)
GLUCOSE UR QL STRIP: NEGATIVE
HCT VFR BLD AUTO: 28.8 % (ref 40–54)
HGB BLD-MCNC: 8.8 G/DL (ref 14–18)
HGB UR QL STRIP: NEGATIVE
HYALINE CASTS UR QL AUTO: 10 /LPF
IMM GRANULOCYTES # BLD AUTO: 0.07 K/UL (ref 0–0.04)
IMM GRANULOCYTES NFR BLD AUTO: 0.4 % (ref 0–0.5)
KETONES UR QL STRIP: NEGATIVE
LEUKOCYTE ESTERASE UR QL STRIP: ABNORMAL
LYMPHOCYTES # BLD AUTO: 1.7 K/UL (ref 1–4.8)
LYMPHOCYTES NFR BLD: 10.8 % (ref 18–48)
MAGNESIUM SERPL-MCNC: 1.8 MG/DL (ref 1.6–2.6)
MCH RBC QN AUTO: 30 PG (ref 27–31)
MCHC RBC AUTO-ENTMCNC: 30.6 G/DL (ref 32–36)
MCV RBC AUTO: 98 FL (ref 82–98)
MICROSCOPIC COMMENT: ABNORMAL
MONOCYTES # BLD AUTO: 1.5 K/UL (ref 0.3–1)
MONOCYTES NFR BLD: 9.1 % (ref 4–15)
NEUTROPHILS # BLD AUTO: 12.8 K/UL (ref 1.8–7.7)
NEUTROPHILS NFR BLD: 79.4 % (ref 38–73)
NITRITE UR QL STRIP: NEGATIVE
NRBC BLD-RTO: 0 /100 WBC
PH UR STRIP: 6 [PH] (ref 5–8)
PHOSPHATE SERPL-MCNC: 3.5 MG/DL (ref 2.7–4.5)
PLATELET # BLD AUTO: 673 K/UL (ref 150–350)
PMV BLD AUTO: 8.9 FL (ref 9.2–12.9)
POTASSIUM SERPL-SCNC: 3.4 MMOL/L (ref 3.5–5.1)
PROT SERPL-MCNC: 6.9 G/DL (ref 6–8.4)
PROT UR QL STRIP: ABNORMAL
RBC # BLD AUTO: 2.93 M/UL (ref 4.6–6.2)
RBC #/AREA URNS AUTO: 0 /HPF (ref 0–4)
SODIUM SERPL-SCNC: 139 MMOL/L (ref 136–145)
SP GR UR STRIP: 1.01 (ref 1–1.03)
SQUAMOUS #/AREA URNS AUTO: 2 /HPF
URN SPEC COLLECT METH UR: ABNORMAL
VANCOMYCIN TROUGH SERPL-MCNC: 30.1 UG/ML (ref 10–22)
WBC # BLD AUTO: 16.11 K/UL (ref 3.9–12.7)
WBC #/AREA URNS AUTO: 7 /HPF (ref 0–5)

## 2020-10-04 PROCEDURE — 94640 AIRWAY INHALATION TREATMENT: CPT

## 2020-10-04 PROCEDURE — 36415 COLL VENOUS BLD VENIPUNCTURE: CPT

## 2020-10-04 PROCEDURE — 99233 PR SUBSEQUENT HOSPITAL CARE,LEVL III: ICD-10-PCS | Mod: ,,, | Performed by: NURSE PRACTITIONER

## 2020-10-04 PROCEDURE — 85025 COMPLETE CBC W/AUTO DIFF WBC: CPT

## 2020-10-04 PROCEDURE — 27200966 HC CLOSED SUCTION SYSTEM

## 2020-10-04 PROCEDURE — 63600175 PHARM REV CODE 636 W HCPCS: Performed by: HOSPITALIST

## 2020-10-04 PROCEDURE — 94668 MNPJ CHEST WALL SBSQ: CPT

## 2020-10-04 PROCEDURE — 20000000 HC ICU ROOM

## 2020-10-04 PROCEDURE — 94761 N-INVAS EAR/PLS OXIMETRY MLT: CPT

## 2020-10-04 PROCEDURE — 63600175 PHARM REV CODE 636 W HCPCS: Performed by: NURSE PRACTITIONER

## 2020-10-04 PROCEDURE — 99233 SBSQ HOSP IP/OBS HIGH 50: CPT | Mod: ,,, | Performed by: NURSE PRACTITIONER

## 2020-10-04 PROCEDURE — 80202 ASSAY OF VANCOMYCIN: CPT

## 2020-10-04 PROCEDURE — 25000242 PHARM REV CODE 250 ALT 637 W/ HCPCS: Performed by: NURSE PRACTITIONER

## 2020-10-04 PROCEDURE — 25000003 PHARM REV CODE 250: Performed by: INTERNAL MEDICINE

## 2020-10-04 PROCEDURE — 87040 BLOOD CULTURE FOR BACTERIA: CPT

## 2020-10-04 PROCEDURE — 25000003 PHARM REV CODE 250: Performed by: HOSPITALIST

## 2020-10-04 PROCEDURE — 84100 ASSAY OF PHOSPHORUS: CPT

## 2020-10-04 PROCEDURE — 80053 COMPREHEN METABOLIC PANEL: CPT

## 2020-10-04 PROCEDURE — 63600175 PHARM REV CODE 636 W HCPCS: Performed by: INTERNAL MEDICINE

## 2020-10-04 PROCEDURE — 83735 ASSAY OF MAGNESIUM: CPT

## 2020-10-04 PROCEDURE — 27000221 HC OXYGEN, UP TO 24 HOURS

## 2020-10-04 PROCEDURE — 99900035 HC TECH TIME PER 15 MIN (STAT)

## 2020-10-04 PROCEDURE — 81001 URINALYSIS AUTO W/SCOPE: CPT

## 2020-10-04 RX ORDER — HEPARIN SODIUM 5000 [USP'U]/ML
5000 INJECTION, SOLUTION INTRAVENOUS; SUBCUTANEOUS EVERY 12 HOURS
Status: DISCONTINUED | OUTPATIENT
Start: 2020-10-04 | End: 2020-10-09 | Stop reason: HOSPADM

## 2020-10-04 RX ADMIN — BACLOFEN 10 MG: 10 TABLET ORAL at 09:10

## 2020-10-04 RX ADMIN — LOPERAMIDE HYDROCHLORIDE 2 MG: 1 SOLUTION ORAL at 09:10

## 2020-10-04 RX ADMIN — PROPRANOLOL HYDROCHLORIDE 20 MG: 20 TABLET ORAL at 02:10

## 2020-10-04 RX ADMIN — GABAPENTIN 250 MG: 250 SOLUTION ORAL at 09:10

## 2020-10-04 RX ADMIN — CEFEPIME 1 G: 1 INJECTION, POWDER, FOR SOLUTION INTRAMUSCULAR; INTRAVENOUS at 02:10

## 2020-10-04 RX ADMIN — VANCOMYCIN HYDROCHLORIDE 1250 MG: 1.25 INJECTION, POWDER, LYOPHILIZED, FOR SOLUTION INTRAVENOUS at 09:10

## 2020-10-04 RX ADMIN — CEFEPIME 1 G: 1 INJECTION, POWDER, FOR SOLUTION INTRAMUSCULAR; INTRAVENOUS at 09:10

## 2020-10-04 RX ADMIN — PROPRANOLOL HYDROCHLORIDE 20 MG: 20 TABLET ORAL at 09:10

## 2020-10-04 RX ADMIN — IPRATROPIUM BROMIDE AND ALBUTEROL SULFATE 3 ML: .5; 2.5 SOLUTION RESPIRATORY (INHALATION) at 11:10

## 2020-10-04 RX ADMIN — IPRATROPIUM BROMIDE AND ALBUTEROL SULFATE 3 ML: .5; 2.5 SOLUTION RESPIRATORY (INHALATION) at 03:10

## 2020-10-04 RX ADMIN — METRONIDAZOLE 500 MG: 500 TABLET ORAL at 09:10

## 2020-10-04 RX ADMIN — CEFEPIME 1 G: 1 INJECTION, POWDER, FOR SOLUTION INTRAMUSCULAR; INTRAVENOUS at 05:10

## 2020-10-04 RX ADMIN — IPRATROPIUM BROMIDE AND ALBUTEROL SULFATE 3 ML: .5; 2.5 SOLUTION RESPIRATORY (INHALATION) at 04:10

## 2020-10-04 RX ADMIN — HEPARIN SODIUM 5000 UNITS: 5000 INJECTION INTRAVENOUS; SUBCUTANEOUS at 09:10

## 2020-10-04 RX ADMIN — ACETAMINOPHEN 650 MG: 325 TABLET ORAL at 02:10

## 2020-10-04 RX ADMIN — Medication 10 ML: at 11:10

## 2020-10-04 RX ADMIN — IPRATROPIUM BROMIDE AND ALBUTEROL SULFATE 3 ML: .5; 2.5 SOLUTION RESPIRATORY (INHALATION) at 07:10

## 2020-10-04 RX ADMIN — PHENOBARBITAL 100 MG: 20 ELIXIR ORAL at 09:10

## 2020-10-04 RX ADMIN — Medication 500 MG: at 09:10

## 2020-10-04 RX ADMIN — LOPERAMIDE HYDROCHLORIDE 2 MG: 1 SOLUTION ORAL at 05:10

## 2020-10-04 RX ADMIN — Medication 1 CAPSULE: at 09:10

## 2020-10-04 RX ADMIN — LOPERAMIDE HYDROCHLORIDE 2 MG: 1 SOLUTION ORAL at 01:10

## 2020-10-04 RX ADMIN — METRONIDAZOLE 500 MG: 500 TABLET ORAL at 02:10

## 2020-10-04 RX ADMIN — BACLOFEN 10 MG: 10 TABLET ORAL at 02:10

## 2020-10-04 RX ADMIN — IPRATROPIUM BROMIDE AND ALBUTEROL SULFATE 3 ML: .5; 2.5 SOLUTION RESPIRATORY (INHALATION) at 08:10

## 2020-10-04 NOTE — PROCEDURES
ICU EEG/VIDEO MONITORING REPORT    DATE OF SERVICE: 10/4/2020  EEG NUMBER: [id number]  REQUESTED BY: Wilma  LOCATION OF SERVICE: [place] [address]    METHODOLOGY   Electroencephalographic (EEG) recording is with electrodes placed according to the International 10-20 placement system.  Thirty two (32) channels of digital signal are simultaneously recorded from the scalp and may include EKG, EMG, and/or eye monitors.   Recording band pass was 0.1 to 512 hz.  Digital video recording of the patient is simultaneously recorded with the EEG.  The nursing staff report clinical symptoms and may press an event button when the patient has symptoms of clinical interest to the treating physicians.  EEG and video recording is stored and archived in digital format.  The entire recording is visually reviewed and the times identified by computer analysis as being spikes or seizures are reviewed again.  Activation procedures which include photic stimulation, hyperventilation and instructing patients to perform simple task are done in selected patients.   Compresses spectral analysis (CSA) is also performed on the activity recorded from each individual channel.  This is displayed as a power display of frequencies from 0 to 30 Hz over time.   The CSA analysis is done and displayed continuously.  This is reviewed for asymmetries in power between homologous areas of the scalp and for presence of changes in power which canbe seen when seizures occur.  Sections of suspected abnormalities on the CSA is then compared with the original EEG recording.     [employer] software was also utilized in the review of this study.  This software suite analyzes the EEG recording in multiple domains.  Coherence and rhythmicity is computed to identify EEG sections which may contain organized seizures.  Each channel undergoes analysis to detect presence of spike and sharp waves which have special and morphological characteristic of epileptic activity.  The  routine EEG recording is converted from spacial into frequency domain.  This is then displayed comparing homologous areas to identify areas of significant asymmetry.  Algorithm to identify non-cortically generated artifact is used to separate eye movement, EMG and other artifact from the EEG.      Recording Times  Start on 10/3/2020 at 1631 running for 14 hours and 34 minutes  Start on 10/4/2020 at 0700 running for 1 hour and 19 minutes    EEG FINDINGS  Physiological states present    Background activity:   The background rhythm was continuous, symmetric and characterized by low voltage beta and diffuse alpha. A poorly formed anterior posterior gradient is seen during wakefulness without a clear posterior dominant alpha rhythm. Reactive. Variable.     Central and R sided chains with increasing artifact by end of study limiting interpretation.    Sleep:  Normal and symmetric sleep transients including sleep spindles, K complexes, vertex waves and POSTS were seen.    Activation procedures: not performed    Periodic and Rhythmic Activity   No epileptiform discharges, periodic discharges, lateralized rhythmic delta activity or electrographic seizures were seen.    Seizures: none    Several pushbutton events for unresponsiveness (was sleeping on EEG) and irregular movement of hands/head with vocalization. EEG was obscured by movement artifacts, interpretable leads had no changes. No EEG changes observed prior to or after push button events.     EKG: EKG was normal sinus rhythm.     IMPRESSION:    This is an essentially normal EEG within the limitations of intepretation outlined above. No epileptiform discharges or seizures seen. Several pushbutton events for non-epileptic spells.     Carlos Hitchcock MD  Neurocritical Care Staff  Neurology

## 2020-10-04 NOTE — ASSESSMENT & PLAN NOTE
Likely aspiration pneumonia vs pneumonitis.     -- Weaned from NRB to 2L NC. On room air today   -- follow up blood and sputum cultures  -- continue vanc, cefepime, and flagyl  -- duonebs  -- CPT   -- NT suctioning PRN  -- strict aspiration precautions

## 2020-10-04 NOTE — H&P
[facility]  Critical Care Medicine  History & Physical    Patient Name: Glen Moscoso  MRN: [id number]  Admission Date: 9/20/2020  Hospital Length of Stay: 13 days  Code Status: Full Code  Attending Physician: Greg Dillon MD   Primary Care Provider: Yadi Lawson MD   Principal Problem: Acute hypoxemic respiratory failure    Subjective:     HPI:  Mr. Glen Moscoso is a 24 y.o. male with CP, complicated by seizures and a peg tube, who presents to the ER for evaluation of fever.  He just had a prolonged and complex hospital stay at [facility] from 8/26-9/18 for seizures and pneumonia.  He was originally admitted to [facility], where he was found to have Pseudomonas and Serratia pneumonia, as well as a decubitus ulcer that was debrided by Gen Surg on 9/7 - cultures were not sent.  He was discharged home 9/18 on Zosyn via LUE PICC until 9/24.  Family at bedside reports he was doing well until the day of admission when he started to have fevers up to 101.2F.  They deny any new respiratory complaints. Given his recent hospitalization and fever while on antibiotics, they brought him to the ER for further evaluation.     Upon arrival to the ER, vitals were temp 101.2F, , RR 24 and /75.  Labs showed WBC 17 with ESR 90 and Procal 0.56.  CT abdomen with pneumoperitoneum, suspect related to a small colonic perforation. Evaluated by surgery and managed conservatively with bowel rest and IV antibiotics.     Rapid Response on 10/3 for respiratory distress, likely aspiration event. Required nonrebreather to maintain saturations >90%. Increased WOB with audible secretions that patient was unable to clear. Transferred to ICU. Respiratory status improved with duonebs, CPT, and NT suction. CXR with left sided airspace disease. Weaned down to 2L NC.     Hospital/ICU Course:  No notes on file     Past Medical History:   Diagnosis Date    Acid reflux     Cerebral palsy     History of hip surgery     S/P  percutaneous endoscopic gastrostomy (PEG) tube placement     Seizures        Past Surgical History:   Procedure Laterality Date    CHOLECYSTECTOMY      FLEXIBLE SIGMOIDOSCOPY N/A 9/9/2020    Procedure: SIGMOIDOSCOPY, FLEXIBLE;  Surgeon: America Goode MD;  Location: [facility] ([address] [place] [address]);  Service: Endoscopy;  Laterality: N/A;    HIP SURGERY         Review of patient's allergies indicates:  No Known Allergies    Family History     Problem Relation (Age of Onset)    Cancer Maternal Grandfather        Tobacco Use    Smoking status: Never Smoker    Smokeless tobacco: Never Used   Substance and Sexual Activity    Alcohol use: Never     Frequency: Never    Drug use: Not Currently    Sexual activity: Not on file      Review of Systems   Unable to perform ROS: Patient nonverbal     Objective:     Vital Signs (Most Recent):  Temp: (!) 101.5 °F (38.6 °C) (10/03/20 1748)  Pulse: (!) 131 (10/03/20 1700)  Resp: (!) 25 (10/03/20 1700)  BP: (!) 145/88 (10/03/20 1700)  SpO2: 99 % (10/03/20 1700) Vital Signs (24h Range):  Temp:  [98.4 °F (36.9 °C)-101.5 °F (38.6 °C)] 101.5 °F (38.6 °C)  Pulse:  [] 131  Resp:  [16-52] 25  SpO2:  [71 %-100 %] 99 %  BP: (129-152)/(82-92) 145/88   Weight: 32.7 kg (72 lb)  Body mass index is 15.58 kg/m².      Intake/Output Summary (Last 24 hours) at 10/3/2020 1853  Last data filed at 10/2/2020 2300  Gross per 24 hour   Intake 80 ml   Output 500 ml   Net -420 ml       Physical Exam  Vitals signs and nursing note reviewed.   Constitutional:       General: He is in acute distress.      Appearance: He is well-developed. He is cachectic. He is ill-appearing (Chronic).      Interventions: Face mask in place.   Eyes:      Conjunctiva/sclera: Conjunctivae normal.   Neck:      Vascular: No JVD.   Cardiovascular:      Rate and Rhythm: Regular rhythm. Tachycardia present.      Pulses:           Radial pulses are 2+ on the right side and 2+ on the left side.        Dorsalis pedis pulses are 1+ on the  right side and 1+ on the left side.      Heart sounds: Normal heart sounds. No murmur. No friction rub. No gallop.    Pulmonary:      Effort: Tachypnea, accessory muscle usage and respiratory distress present.      Breath sounds: Rhonchi present. No wheezing.   Abdominal:      General: Abdomen is flat. Bowel sounds are normal. There is no distension.      Palpations: Abdomen is soft.      Tenderness: There is no abdominal tenderness. There is no guarding.      Comments: PEG tube in place   Genitourinary:     Comments: Condom cath in place   Musculoskeletal:      Right lower leg: No edema.      Left lower leg: No edema.   Skin:     Capillary Refill: Capillary refill takes less than 2 seconds.      Findings: No erythema or rash.   Psychiatric:         Speech: He is noncommunicative.     Vents:     Lines/Drains/Airways     Peripherally Inserted Central Catheter Line            PICC Double Lumen 09/28/20 1331 right cephalic 5 days          Drain                 Gastrostomy/Enterostomy 08/04/20 1653 Percutaneous endoscopic gastrostomy (PEG) feeding 60 days    Male External Urinary Catheter 09/27/20 1300 Small 6 days              Significant Labs:    CBC/Anemia Profile:  Recent Labs   Lab 10/02/20  0405 10/03/20  0517 10/03/20  1350   WBC 10.13 11.99 14.23*   HGB 9.3* 9.5* 9.1*   HCT 30.3* 31.1* 30.0*   * 735* 702*   MCV 97 98 98   RDW 14.6* 14.3 14.3        Chemistries:  Recent Labs   Lab 10/02/20  0404 10/03/20  0517    137   K 4.1 4.1    101   CO2 26 27   BUN 9 9   CREATININE 0.5 0.5   CALCIUM 8.9 9.1   ALBUMIN 2.1* 2.2*   PROT 7.0 7.1   BILITOT 0.1 0.2   ALKPHOS 91 89   ALT 10 9*   AST 15 11   MG 1.9 1.8   PHOS 2.9 2.9       All pertinent labs within the past 24 hours have been reviewed.    Significant Imaging: I have reviewed all pertinent imaging results/findings within the past 24 hours.    Assessment/Plan:     Neuro  Seizure disorder  Chronic. Managed on phenobarbital. Recent NCC admit x 2 for  suspected status  -- concern may be having subclinical seizures now with roving eye motions not typical baseline seen today 10/3 leading to aspiration  -- EEG in place     Cerebral palsy  Chronic condition. Patient with poor baseline functional status, non-communicative, and bed bound/wheelchair bound with extremity contractures to all extremities at baseline.   Requires 24/7 care at home by mom who is very involved in his care at baseline    Derm  Decubitus ulcer of ischial area, left, stage IV  Chronic.  -- Wounds are improving. Continue current wound care (following in house known well to their team)  -- Wounds appear clean with no definite signs of infection.   -- Exposed bone to ischial areas and ID with concern for underlying osteomyelitis even though wounds themselves do not appear infected so recommend extended course of abx with IV Vancomycin, Cefepime, and po Flagyl until at least 10/22.   -- IR placed tunneled line on 9/28 for long term antibiotics to right chest wall.     Pulmonary  * Acute hypoxemic respiratory failure  Likely aspiration pneumonia vs pneumonitis.     -- Weaned from NRB to 2L NC  -- follow up blood and sputum cultures  -- continue vanc, cefepime, and flagyl  -- duonebs  -- CPT   -- NT suctioning PRN    ID  Sepsis  Fever, tachycardia, rising leukocytosis. Likely 2/2 to aspiration pneumonia     -- f/u blood, urine, and sputum  cultures  -- continue antibiotics   -- ID following, appreciate assistance      Oncology  Anemia of chronic disease  Chronic and controlled and related to chronic infection, poor nutrition and poor baseline function status.   -- Monitor anemia with daily CBC and transfuse if Hgb < 7.   -- stable at mid 9's now. Required 1 tx on last admit and stable since    Endocrine  Severe protein-calorie malnutrition  Present on admit and related to colitis, recent medical illnesses. Patient with PEG and receiving TF with Prebio to help with nutrition. Likely cause of poor wound  healing and development of ischial ulcers.   -- Nutrition consulted and following; Peptamen with Prebio at 40 mL/hr       GI  Pneumoperitoneum  General surgery consulted on admit for pneumoperitoneum seen on admit CT scan of abdomen and pelvis. General surgery felt likely contained leak and very small bowel perforation related to recent colitis. Due to patient's very poor functional level at baseline and malnutrition felt to be very poor surgical candidate and recommended conservative.  --Repeat CT abdomen on 9/30 with resolution of pneumoperitoneum     Plan discussed with Dr. Dillon.  Mother updated via phone.     Critical Care Time: 70 minutes  Critical secondary to Patient has a condition that poses threat to life and bodily function: Severe Respiratory Distress     Critical care was time spent personally by me on the following activities: development of treatment plan with patient or surrogate and bedside caregivers, discussions with consultants, evaluation of patient's response to treatment, examination of patient, ordering and performing treatments and interventions, ordering and review of laboratory studies, ordering and review of radiographic studies, pulse oximetry, re-evaluation of patient's condition. This critical care time did not overlap with that of any other provider or involve time for any procedures.     Lyubov Solorio, AMARI  Critical Care Medicine  [facility]

## 2020-10-04 NOTE — ASSESSMENT & PLAN NOTE
Chronic condition. Patient with poor baseline functional status, non-communicative, and bed bound/wheelchair bound with extremity contractures to all extremities at baseline.   Requires 24/7 care at home by mom who is very involved in his care at baseline

## 2020-10-04 NOTE — ASSESSMENT & PLAN NOTE
Chronic. Managed on phenobarbital. Recent NCC admit x 2 for suspected status  -- concern may be having subclinical seizures now with roving eye motions not typical baseline seen today 10/3 leading to aspiration  -- EEG in place

## 2020-10-04 NOTE — PLAN OF CARE
Known to im-k  Step up yesterady. Treated for aspiration pneumonia. eeg negative. Le us negative. Continue current antibiotic regimen per id recs.  On low flow oxygen, pulmonary suctioning close resp care  Step down back to  im-k

## 2020-10-04 NOTE — ASSESSMENT & PLAN NOTE
Likely aspiration pneumonia vs pneumonitis.     -- Weaned from NRB to 2L NC  -- follow up blood and sputum cultures  -- continue vanc, cefepime, and flagyl  -- duonebs  -- CPT   -- NT suctioning PRN

## 2020-10-04 NOTE — ASSESSMENT & PLAN NOTE
Chronic and controlled and related to chronic infection, poor nutrition and poor baseline function status.   -- Monitor anemia with daily CBC and transfuse if Hgb < 7.   -- Slowly decreasing from mid 9's. Required 1 tx on last admit and stable since

## 2020-10-04 NOTE — SUBJECTIVE & OBJECTIVE
Interval History/Significant Events: Significant improvement in respiratory status with nebs, CPT, and NT suctioning. Weaned to room air today. EEG with no seizure activity. LE US with no DVT. Stable for step down from ICU. Per mother, he seems back to himself enjoying listening to his music. Mother, Leni, updated at bedside.     Review of Systems   Unable to perform ROS: Patient nonverbal     Objective:     Vital Signs (Most Recent):  Temp: 98.4 °F (36.9 °C) (10/04/20 1100)  Pulse: 110 (10/04/20 1400)  Resp: 10 (10/04/20 1400)  BP: 124/87 (10/04/20 1400)  SpO2: 99 % (10/04/20 1400) Vital Signs (24h Range):  Temp:  [98.3 °F (36.8 °C)-101.5 °F (38.6 °C)] 98.4 °F (36.9 °C)  Pulse:  [] 110  Resp:  [10-52] 10  SpO2:  [87 %-100 %] 99 %  BP: (122-165)/() 124/87   Weight: 32.7 kg (72 lb)  Body mass index is 15.58 kg/m².      Intake/Output Summary (Last 24 hours) at 10/4/2020 1442  Last data filed at 10/4/2020 1400  Gross per 24 hour   Intake 1200 ml   Output 1600 ml   Net -400 ml       Physical Exam  Vitals signs and nursing note reviewed.   Constitutional:       General: He is not in acute distress.     Appearance: He is well-developed. He is cachectic. Ill appearance: Chronic.      Comments: 32kg   Eyes:      Conjunctiva/sclera: Conjunctivae normal.   Neck:      Vascular: No JVD.   Cardiovascular:      Rate and Rhythm: Regular rhythm. Tachycardia present.      Pulses:           Radial pulses are 2+ on the right side and 2+ on the left side.        Dorsalis pedis pulses are 1+ on the right side and 1+ on the left side.      Heart sounds: Normal heart sounds. No murmur. No friction rub. No gallop.    Pulmonary:      Effort: Tachypnea and accessory muscle usage present. No respiratory distress.      Breath sounds: Rhonchi present. No wheezing.   Abdominal:      General: Abdomen is flat. Bowel sounds are normal. There is no distension.      Palpations: Abdomen is soft.      Tenderness: There is no abdominal  tenderness. There is no guarding.      Comments: PEG tube in place   Genitourinary:     Comments: Condom cath in place   Musculoskeletal:      Right lower leg: No edema.      Left lower leg: No edema.   Skin:     Capillary Refill: Capillary refill takes less than 2 seconds.      Findings: No erythema or rash.   Psychiatric:         Speech: He is noncommunicative.          Lines/Drains/Airways     Peripherally Inserted Central Catheter Line            PICC Double Lumen 09/28/20 1331 right cephalic 6 days          Central Venous Catheter Line            Permacath 09/28/20 6 days          Drain                 Gastrostomy/Enterostomy 08/04/20 1653 Percutaneous endoscopic gastrostomy (PEG) feeding 60 days    Male External Urinary Catheter 09/27/20 1300 Small 7 days              Significant Labs:    CBC/Anemia Profile:  Recent Labs   Lab 10/03/20  0517 10/03/20  1350 10/04/20  0208   WBC 11.99 14.23* 16.11*   HGB 9.5* 9.1* 8.8*   HCT 31.1* 30.0* 28.8*   * 702* 673*   MCV 98 98 98   RDW 14.3 14.3 13.9        Chemistries:  Recent Labs   Lab 10/03/20  0517 10/04/20  0208    139   K 4.1 3.4*    102   CO2 27 26   BUN 9 8   CREATININE 0.5 0.4*   CALCIUM 9.1 8.8   ALBUMIN 2.2* 2.2*   PROT 7.1 6.9   BILITOT 0.2 0.2   ALKPHOS 89 89   ALT 9* 8*   AST 11 12   MG 1.8 1.8   PHOS 2.9 3.5       All pertinent labs within the past 24 hours have been reviewed.    Significant Imaging:  I have reviewed all pertinent imaging results/findings within the past 24 hours.

## 2020-10-04 NOTE — PROGRESS NOTES
[facility]  Critical Care Medicine  Progress Note    Patient Name: Glen Moscoso  MRN: [id number]  Admission Date: 9/20/2020  Hospital Length of Stay: 14 days  Code Status: Full Code  Attending Provider: Greg Dillon MD  Primary Care Provider: Yadi Lawson MD   Principal Problem: Acute hypoxemic respiratory failure    Subjective:     HPI:  Mr. Glen Moscoso is a 24 y.o. male with CP, complicated by seizures and a peg tube, who presents to the ER for evaluation of fever.  He just had a prolonged and complex hospital stay at [facility] from 8/26-9/18 for seizures and pneumonia.  He was originally admitted to [facility], where he was found to have Pseudomonas and Serratia pneumonia, as well as a decubitus ulcer that was debrided by Gen Surg on 9/7 - cultures were not sent.  He was discharged home 9/18 on Zosyn via LUE PICC until 9/24.  Family at bedside reports he was doing well until the day of admission when he started to have fevers up to 101.2F.  They deny any new respiratory complaints. Given his recent hospitalization and fever while on antibiotics, they brought him to the ER for further evaluation.     Upon arrival to the ER, vitals were temp 101.2F, , RR 24 and /75.  Labs showed WBC 17 with ESR 90 and Procal 0.56.  CT abdomen with pneumoperitoneum, suspect related to a small colonic perforation. Evaluated by surgery and managed conservatively with bowel rest and IV antibiotics.     Rapid Response on 10/3 for respiratory distress, likely aspiration event. Required nonrebreather to maintain saturations >90%. Increased WOB with audible secretions that patient was unable to clear. Transferred to ICU. Respiratory status improved with duonebs, CPT, and NT suction. CXR with left sided airspace disease. Weaned down to 2L NC.     Hospital/ICU Course:  No notes on file    Interval History/Significant Events: Significant improvement in respiratory status with nebs, CPT, and NT suctioning. Weaned to  room air today. EEG with no seizure activity. LE US with no DVT. Stable for step down from ICU. Per mother, he seems back to himself enjoying listening to his music. Mother, Leni, updated at bedside.     Review of Systems   Unable to perform ROS: Patient nonverbal     Objective:     Vital Signs (Most Recent):  Temp: 98.4 °F (36.9 °C) (10/04/20 1100)  Pulse: 110 (10/04/20 1400)  Resp: 10 (10/04/20 1400)  BP: 124/87 (10/04/20 1400)  SpO2: 99 % (10/04/20 1400) Vital Signs (24h Range):  Temp:  [98.3 °F (36.8 °C)-101.5 °F (38.6 °C)] 98.4 °F (36.9 °C)  Pulse:  [] 110  Resp:  [10-52] 10  SpO2:  [87 %-100 %] 99 %  BP: (122-165)/() 124/87   Weight: 32.7 kg (72 lb)  Body mass index is 15.58 kg/m².      Intake/Output Summary (Last 24 hours) at 10/4/2020 1442  Last data filed at 10/4/2020 1400  Gross per 24 hour   Intake 1200 ml   Output 1600 ml   Net -400 ml       Physical Exam  Vitals signs and nursing note reviewed.   Constitutional:       General: He is not in acute distress.     Appearance: He is well-developed. He is cachectic. Ill appearance: Chronic.      Comments: 32kg   Eyes:      Conjunctiva/sclera: Conjunctivae normal.   Neck:      Vascular: No JVD.   Cardiovascular:      Rate and Rhythm: Regular rhythm. Tachycardia present.      Pulses:           Radial pulses are 2+ on the right side and 2+ on the left side.        Dorsalis pedis pulses are 1+ on the right side and 1+ on the left side.      Heart sounds: Normal heart sounds. No murmur. No friction rub. No gallop.    Pulmonary:      Effort: Tachypnea and accessory muscle usage present. No respiratory distress.      Breath sounds: Rhonchi present. No wheezing.   Abdominal:      General: Abdomen is flat. Bowel sounds are normal. There is no distension.      Palpations: Abdomen is soft.      Tenderness: There is no abdominal tenderness. There is no guarding.      Comments: PEG tube in place   Genitourinary:     Comments: Condom cath in place    Musculoskeletal:      Right lower leg: No edema.      Left lower leg: No edema.   Skin:     Capillary Refill: Capillary refill takes less than 2 seconds.      Findings: No erythema or rash.   Psychiatric:         Speech: He is noncommunicative.          Lines/Drains/Airways     Peripherally Inserted Central Catheter Line            PICC Double Lumen 09/28/20 1331 right cephalic 6 days          Central Venous Catheter Line            Permacath 09/28/20 6 days          Drain                 Gastrostomy/Enterostomy 08/04/20 1653 Percutaneous endoscopic gastrostomy (PEG) feeding 60 days    Male External Urinary Catheter 09/27/20 1300 Small 7 days              Significant Labs:    CBC/Anemia Profile:  Recent Labs   Lab 10/03/20  0517 10/03/20  1350 10/04/20  0208   WBC 11.99 14.23* 16.11*   HGB 9.5* 9.1* 8.8*   HCT 31.1* 30.0* 28.8*   * 702* 673*   MCV 98 98 98   RDW 14.3 14.3 13.9        Chemistries:  Recent Labs   Lab 10/03/20  0517 10/04/20  0208    139   K 4.1 3.4*    102   CO2 27 26   BUN 9 8   CREATININE 0.5 0.4*   CALCIUM 9.1 8.8   ALBUMIN 2.2* 2.2*   PROT 7.1 6.9   BILITOT 0.2 0.2   ALKPHOS 89 89   ALT 9* 8*   AST 11 12   MG 1.8 1.8   PHOS 2.9 3.5       All pertinent labs within the past 24 hours have been reviewed.    Significant Imaging:  I have reviewed all pertinent imaging results/findings within the past 24 hours.      ABG  No results for input(s): PH, PO2, PCO2, HCO3, BE in the last 168 hours.  Assessment/Plan:     Neuro  Seizure disorder  Chronic. Managed on phenobarbital. Recent NCC admit x 2 for suspected status  -- concern may be having subclinical seizures now with roving eye motions not typical baseline seen today 10/3 leading to aspiration  -- EEG 10/3 with no epileptiform activity    Cerebral palsy  Chronic condition. Patient with poor baseline functional status, non-communicative, and bed bound/wheelchair bound with extremity contractures to all extremities at  baseline.   Requires 24/7 care at home by mom who is very involved in his care at baseline    Derm  Decubitus ulcer of ischial area, left, stage IV  Chronic.  -- Wounds are improving. Continue current wound care (following in house known well to their team)  -- Wounds appear clean with no definite signs of infection.   -- Exposed bone to ischial areas and ID with concern for underlying osteomyelitis even though wounds themselves do not appear infected so recommend extended course of abx with IV Vancomycin, Cefepime, and po Flagyl until at least 10/22.   -- IR placed tunneled line on 9/28 for long term antibiotics to right chest wall.     Pulmonary  * Acute hypoxemic respiratory failure  Likely aspiration pneumonia vs pneumonitis.     -- Weaned from NRB to 2L NC. On room air today   -- follow up blood and sputum cultures  -- continue vanc, cefepime, and flagyl  -- duonebs  -- CPT   -- NT suctioning PRN  -- strict aspiration precautions     ID  Sepsis  Fever, tachycardia, rising leukocytosis. Likely 2/2 to aspiration pneumonia     -- f/u blood, urine, and sputum  cultures  -- continue antibiotics   -- ID following, appreciate assistance      Oncology  Anemia of chronic disease  Chronic and controlled and related to chronic infection, poor nutrition and poor baseline function status.   -- Monitor anemia with daily CBC and transfuse if Hgb < 7.   -- Slowly decreasing from mid 9's. Required 1 tx on last admit and stable since    Endocrine  Severe protein-calorie malnutrition  Present on admit and related to colitis, recent medical illnesses. Patient with PEG and receiving TF with Prebio to help with nutrition. Likely cause of poor wound healing and development of ischial ulcers.   -- Nutrition consulted and following; Peptamen with Prebio at 40 mL/hr       GI  Pneumoperitoneum  General surgery consulted on admit for pneumoperitoneum seen on admit CT scan of abdomen and pelvis. General surgery felt likely contained leak  and very small bowel perforation related to recent colitis. Due to patient's very poor functional level at baseline and malnutrition felt to be very poor surgical candidate and recommended conservative.  --Repeat CT abdomen on 9/30 with resolution of pneumoperitoneum     Plan discussed with Dr. Dillon.     I spent >35 minutes reviewing patient records, examining, and counseling the patient with greater than 50% of the time spent with direct patient care and coordination.     Lyubov Solorio NP  Critical Care Medicine  [facility]

## 2020-10-04 NOTE — ASSESSMENT & PLAN NOTE
Chronic and controlled and related to chronic infection, poor nutrition and poor baseline function status.   -- Monitor anemia with daily CBC and transfuse if Hgb < 7.   -- stable at mid 9's now. Required 1 tx on last admit and stable since

## 2020-10-04 NOTE — ASSESSMENT & PLAN NOTE
Fever, tachycardia, rising leukocytosis. Likely 2/2 to aspiration pneumonia     -- f/u blood, urine, and sputum  cultures  -- continue antibiotics   -- ID following, appreciate assistance

## 2020-10-04 NOTE — ASSESSMENT & PLAN NOTE
General surgery consulted on admit for pneumoperitoneum seen on admit CT scan of abdomen and pelvis. General surgery felt likely contained leak and very small bowel perforation related to recent colitis. Due to patient's very poor functional level at baseline and malnutrition felt to be very poor surgical candidate and recommended conservative.  --Repeat CT abdomen on 9/30 with resolution of pneumoperitoneum

## 2020-10-04 NOTE — ASSESSMENT & PLAN NOTE
Chronic. Managed on phenobarbital. Recent NCC admit x 2 for suspected status  -- concern may be having subclinical seizures now with roving eye motions not typical baseline seen today 10/3 leading to aspiration  -- EEG 10/3 with no epileptiform activity

## 2020-10-04 NOTE — PLAN OF CARE
Mr. Moscoso T-max of 101.5, treated a couple times with tylenol and applied ice bags to bilateral auxillas/groins, cooled room temperature. Removed once patient temp decreased. Patient remained tachycardia in 126s after every episode of awaken from possible seizures or baseline from CP> Noted a possible seizure episodes overnight along with respiratory and HR changes, press button to capture time on EGG machine. Urine and blood cultures send overnight. Mother called and updated of care plan. Will continue to monitor and assess.     Problem: Adult Inpatient Plan of Care  Goal: Plan of Care Review  Outcome: Ongoing, Progressing  Goal: Patient-Specific Goal (Individualization)  Outcome: Ongoing, Progressing  Goal: Absence of Hospital-Acquired Illness or Injury  Outcome: Ongoing, Progressing  Goal: Optimal Comfort and Wellbeing  Outcome: Ongoing, Progressing  Goal: Readiness for Transition of Care  Outcome: Ongoing, Progressing  Goal: Rounds/Family Conference  Outcome: Ongoing, Progressing     Problem: Adjustment to Illness (Sepsis/Septic Shock)  Goal: Optimal Coping  Outcome: Ongoing, Progressing     Problem: Bleeding (Sepsis/Septic Shock)  Goal: Absence of Bleeding  Outcome: Ongoing, Progressing     Problem: Glycemic Control Impaired (Sepsis/Septic Shock)  Goal: Blood Glucose Level Within Desired Range  Outcome: Ongoing, Progressing     Problem: Hemodynamic Instability (Sepsis/Septic Shock)  Goal: Effective Tissue Perfusion  Outcome: Ongoing, Progressing     Problem: Infection (Sepsis/Septic Shock)  Goal: Absence of Infection Signs/Symptoms  Outcome: Ongoing, Not Progressing     Problem: Nutrition Impaired (Sepsis/Septic Shock)  Goal: Optimal Nutrition Intake  Outcome: Ongoing, Not Progressing     Problem: Respiratory Compromise (Sepsis/Septic Shock)  Goal: Effective Oxygenation and Ventilation  Outcome: Ongoing, Progressing     Problem: Infection  Goal: Infection Symptom Resolution  Outcome: Ongoing, Progressing      Problem: Wound  Goal: Optimal Wound Healing  Outcome: Ongoing, Progressing     Problem: Skin Injury Risk Increased  Goal: Skin Health and Integrity  Outcome: Ongoing, Progressing     Problem: Fall Injury Risk  Goal: Absence of Fall and Fall-Related Injury  Outcome: Ongoing, Progressing

## 2020-10-04 NOTE — PROGRESS NOTES
Pharmacokinetic Assessment Follow Up: IV Vancomycin    Vancomycin serum concentration assessment(s):    Vancomycin trough level resulted at 30.1 mcg/mL, drawn appropriately. Dose already given prior to result of level. Goal level is 15 to 20 mcg/mL.     Renal function appears stable.     Drug levels (last 3 results):  Recent Labs   Lab Result Units 10/02/20  1002 10/04/20  0857   Vancomycin-Trough ug/mL 12.5 30.1*     Vancomycin Regimen Plan:    Discontinue the scheduled vancomycin regimen and redose when the random level is less than 20 mcg/mL. Next level to be drawn with morning labs on 10/5.    Pharmacy will continue to follow and monitor vancomycin.    Please contact pharmacy at extension [phone] for questions regarding this assessment.    Thank you for the consult,   Crystal Keith, PharmD, [facility]             Patient brief summary:  Glen Moscoso is a 24 y.o. male initiated on antimicrobial therapy with IV vancomycin for treatment of bone/joint infection    Drug Allergies:   Review of patient's allergies indicates:  No Known Allergies    Actual Body Weight:   32.7 kg     Renal Function:   Estimated Creatinine Clearance: 134 mL/min (A) (based on SCr of 0.4 mg/dL (L)).    Dialysis Method (if applicable):  N/A    CBC (last 72 hours):  Recent Labs   Lab Result Units 10/02/20  0405 10/03/20  0517 10/03/20  1350 10/04/20  0208   WBC K/uL 10.13 11.99 14.23* 16.11*   Hemoglobin g/dL 9.3* 9.5* 9.1* 8.8*   Hematocrit % 30.3* 31.1* 30.0* 28.8*   Platelets K/uL 710* 735* 702* 673*   Gran% % 71.4 75.7* 80.4* 79.4*   Lymph% % 15.1* 11.3* 9.6* 10.8*   Mono% % 12.9 12.5 9.3 9.1   Eosinophil% % 0.0 0.0 0.0 0.0   Basophil% % 0.3 0.2 0.3 0.3   Differential Method  Automated Automated Automated Automated       Metabolic Panel (last 72 hours):  Recent Labs   Lab Result Units 10/02/20  0404 10/03/20  0517 10/04/20  0208   Sodium mmol/L 139 137 139   Potassium mmol/L 4.1 4.1 3.4*   Chloride mmol/L 104 101 102   CO2 mmol/L 26 27 26    Glucose mg/dL 103 119* 99   Glucose, UA   --   --  Negative   BUN, Bld mg/dL 9 9 8   Creatinine mg/dL 0.5 0.5 0.4*   Albumin g/dL 2.1* 2.2* 2.2*   Total Bilirubin mg/dL 0.1 0.2 0.2   Alkaline Phosphatase U/L 91 89 89   AST U/L 15 11 12   ALT U/L 10 9* 8*   Magnesium mg/dL 1.9 1.8 1.8   Phosphorus mg/dL 2.9 2.9 3.5       Vancomycin Administrations:  vancomycin given in the last 96 hours                   vancomycin 1.25 g in dextrose 5% 250 mL IVPB (ready to mix) (mg) 1,250 mg New Bag 10/04/20 0919     1,250 mg New Bag 10/03/20 2118     1,250 mg New Bag  0906     1,250 mg New Bag 10/02/20 2157    vancomycin in dextrose 5 % 1 gram/250 mL IVPB 1,000 mg (mg) 1,000 mg New Bag 10/02/20 0941     1,000 mg New Bag 10/01/20 2144     1,000 mg New Bag  0923     1,000 mg New Bag 09/30/20 2120                Microbiologic Results:  Microbiology Results (last 7 days)     Procedure Component Value Units Date/Time    Blood culture [[id number]] Collected: 10/04/20 0133    Order Status: Completed Specimen: Blood from Peripheral, Lower Arm, Left Updated: 10/04/20 0915     Blood Culture, Routine No Growth to date    Narrative:      Blood cultures x 2 different sites. 4 bottles total. Please  draw cultures before administering antibiotics.    Blood culture [[id number]] Collected: 10/03/20 1515    Order Status: Completed Specimen: Blood from Peripheral, Hand, Right Updated: 10/04/20 0115     Blood Culture, Routine No Growth to date    Narrative:      Blood cultures from 2 different sites. 4 bottles total.  Please draw before starting antibiotics.    Culture, Respiratory with Gram Stain [[id number]] Collected: 10/03/20 1749    Order Status: Completed Specimen: Respiratory from Endotracheal Aspirate Updated: 10/04/20 0020     Gram Stain (Respiratory) <10 epithelial cells per low power field.     Gram Stain (Respiratory) Rare WBC's     Gram Stain (Respiratory) Rare budding yeast    Narrative:      Mini-BAL. Before antibiotics.    Blood  culture [[id number]] Collected: 09/24/20 0533    Order Status: Completed Specimen: Blood Updated: 09/29/20 1012     Blood Culture, Routine No growth after 5 days.    Blood culture [[id number]] Collected: 09/24/20 0533    Order Status: Completed Specimen: Blood Updated: 09/29/20 1012     Blood Culture, Routine No growth after 5 days.

## 2020-10-04 NOTE — ASSESSMENT & PLAN NOTE
Chronic.  -- Wounds are improving. Continue current wound care (following in house known well to their team)  -- Wounds appear clean with no definite signs of infection.   -- Exposed bone to ischial areas and ID with concern for underlying osteomyelitis even though wounds themselves do not appear infected so recommend extended course of abx with IV Vancomycin, Cefepime, and po Flagyl until at least 10/22.   -- IR placed tunneled line on 9/28 for long term antibiotics to right chest wall.

## 2020-10-04 NOTE — ASSESSMENT & PLAN NOTE
Present on admit and related to colitis, recent medical illnesses. Patient with PEG and receiving TF with Prebio to help with nutrition. Likely cause of poor wound healing and development of ischial ulcers.   -- Nutrition consulted and following; Peptamen with Prebio at 40 mL/hr

## 2020-10-04 NOTE — PLAN OF CARE
[facility] DAILY GOALS       A: Awake    RASS: Goal - RASS Goal: 0-->alert and calm  Actual - RASS ([place] Agitation-Sedation Scale): 0-->alert and calm   Restraint necessity:    B: Breath   SBT: Not intubated   C: Coordinate A & B, analgesics/sedatives   Pain: managed    SAT: Not intubated  D: Delirium   CAM-ICU: Overall CAM-ICU: (P) Negative  E: Early(intubated/ Progressive (non-intubated) Mobility   MOVE Screen: Fail   Activity: Activity Management: patient unable to perform activities  FAS: Feeding/Nutrition   Diet order: Diet/Nutrition Received: NPO, tube feeding,   Fluid restriction:    T: Thrombus   DVT prophylaxis: VTE Required Core Measure: (P) Pharmacological prophylaxis initiated/maintained  H: HOB Elevation   Head of Bed (HOB): HOB at 30-45 degrees  U: Ulcer Prophylaxis   GI: yes  G: Glucose control   managed    S: Skin   Bundle compliance: yes   Bathing/Skin Care: incontinence care, bedtime care, dressed/undressed, linen changed Date: [unfilled]  B: Bowel Function   no issues   I: Indwelling Catheters   Iglesias necessity:     CVC necessity: Yes   IPAD offered: No  D: De-escalation Antibx   No  Plan for the day   Continue abx, start tube feeds, possible step down  Family/Goals of care/Code Status   Code Status: Full Code     Pt restarted on tube feeds and tolerating well. Transfer orders initiated, awaiting available bed. VS and assessment per flow sheet, patient progressing towards goals as tolerated, plan of care reviewed with Glen Moscoso and mother, Leni, all concerns addressed, will continue to monitor.

## 2020-10-05 LAB
ALBUMIN SERPL BCP-MCNC: 2 G/DL (ref 3.5–5.2)
ALP SERPL-CCNC: 79 U/L (ref 55–135)
ALT SERPL W/O P-5'-P-CCNC: 8 U/L (ref 10–44)
ANION GAP SERPL CALC-SCNC: 8 MMOL/L (ref 8–16)
AST SERPL-CCNC: 15 U/L (ref 10–40)
BASOPHILS # BLD AUTO: 0.03 K/UL (ref 0–0.2)
BASOPHILS NFR BLD: 0.3 % (ref 0–1.9)
BILIRUB SERPL-MCNC: 0.1 MG/DL (ref 0.1–1)
BUN SERPL-MCNC: 7 MG/DL (ref 6–20)
CALCIUM SERPL-MCNC: 8.5 MG/DL (ref 8.7–10.5)
CHLORIDE SERPL-SCNC: 103 MMOL/L (ref 95–110)
CO2 SERPL-SCNC: 24 MMOL/L (ref 23–29)
CREAT SERPL-MCNC: 0.4 MG/DL (ref 0.5–1.4)
DIFFERENTIAL METHOD: ABNORMAL
EOSINOPHIL # BLD AUTO: 0 K/UL (ref 0–0.5)
EOSINOPHIL NFR BLD: 0.1 % (ref 0–8)
ERYTHROCYTE [DISTWIDTH] IN BLOOD BY AUTOMATED COUNT: 13.8 % (ref 11.5–14.5)
EST. GFR  (AFRICAN AMERICAN): >60 ML/MIN/1.73 M^2
EST. GFR  (NON AFRICAN AMERICAN): >60 ML/MIN/1.73 M^2
GLUCOSE SERPL-MCNC: 116 MG/DL (ref 70–110)
HCT VFR BLD AUTO: 27.8 % (ref 40–54)
HGB BLD-MCNC: 8.4 G/DL (ref 14–18)
IMM GRANULOCYTES # BLD AUTO: 0.03 K/UL (ref 0–0.04)
IMM GRANULOCYTES NFR BLD AUTO: 0.3 % (ref 0–0.5)
LYMPHOCYTES # BLD AUTO: 1.3 K/UL (ref 1–4.8)
LYMPHOCYTES NFR BLD: 13 % (ref 18–48)
MAGNESIUM SERPL-MCNC: 1.7 MG/DL (ref 1.6–2.6)
MCH RBC QN AUTO: 30 PG (ref 27–31)
MCHC RBC AUTO-ENTMCNC: 30.2 G/DL (ref 32–36)
MCV RBC AUTO: 99 FL (ref 82–98)
MONOCYTES # BLD AUTO: 1.1 K/UL (ref 0.3–1)
MONOCYTES NFR BLD: 10.3 % (ref 4–15)
NEUTROPHILS # BLD AUTO: 7.8 K/UL (ref 1.8–7.7)
NEUTROPHILS NFR BLD: 76 % (ref 38–73)
NRBC BLD-RTO: 0 /100 WBC
PHOSPHATE SERPL-MCNC: 3.3 MG/DL (ref 2.7–4.5)
PLATELET # BLD AUTO: 618 K/UL (ref 150–350)
PMV BLD AUTO: 9 FL (ref 9.2–12.9)
POTASSIUM SERPL-SCNC: 3.7 MMOL/L (ref 3.5–5.1)
PROT SERPL-MCNC: 6.5 G/DL (ref 6–8.4)
RBC # BLD AUTO: 2.8 M/UL (ref 4.6–6.2)
SODIUM SERPL-SCNC: 135 MMOL/L (ref 136–145)
VANCOMYCIN SERPL-MCNC: 11.5 UG/ML
WBC # BLD AUTO: 10.27 K/UL (ref 3.9–12.7)

## 2020-10-05 PROCEDURE — 63600175 PHARM REV CODE 636 W HCPCS: Performed by: NURSE PRACTITIONER

## 2020-10-05 PROCEDURE — 99233 PR SUBSEQUENT HOSPITAL CARE,LEVL III: ICD-10-PCS | Mod: ,,, | Performed by: NURSE PRACTITIONER

## 2020-10-05 PROCEDURE — 25000003 PHARM REV CODE 250: Performed by: HOSPITALIST

## 2020-10-05 PROCEDURE — 99233 PR SUBSEQUENT HOSPITAL CARE,LEVL III: ICD-10-PCS | Mod: ,,, | Performed by: HOSPITALIST

## 2020-10-05 PROCEDURE — 94668 MNPJ CHEST WALL SBSQ: CPT

## 2020-10-05 PROCEDURE — 63600175 PHARM REV CODE 636 W HCPCS: Performed by: PHYSICIAN ASSISTANT

## 2020-10-05 PROCEDURE — 94761 N-INVAS EAR/PLS OXIMETRY MLT: CPT

## 2020-10-05 PROCEDURE — 84100 ASSAY OF PHOSPHORUS: CPT

## 2020-10-05 PROCEDURE — 63600175 PHARM REV CODE 636 W HCPCS: Performed by: INTERNAL MEDICINE

## 2020-10-05 PROCEDURE — 25000242 PHARM REV CODE 250 ALT 637 W/ HCPCS: Performed by: NURSE PRACTITIONER

## 2020-10-05 PROCEDURE — 99233 SBSQ HOSP IP/OBS HIGH 50: CPT | Mod: ,,, | Performed by: NURSE PRACTITIONER

## 2020-10-05 PROCEDURE — 20600001 HC STEP DOWN PRIVATE ROOM

## 2020-10-05 PROCEDURE — 83735 ASSAY OF MAGNESIUM: CPT

## 2020-10-05 PROCEDURE — 80053 COMPREHEN METABOLIC PANEL: CPT

## 2020-10-05 PROCEDURE — 94640 AIRWAY INHALATION TREATMENT: CPT

## 2020-10-05 PROCEDURE — 85025 COMPLETE CBC W/AUTO DIFF WBC: CPT

## 2020-10-05 PROCEDURE — 25000003 PHARM REV CODE 250: Performed by: INTERNAL MEDICINE

## 2020-10-05 PROCEDURE — 99233 SBSQ HOSP IP/OBS HIGH 50: CPT | Mod: ,,, | Performed by: HOSPITALIST

## 2020-10-05 PROCEDURE — 80202 ASSAY OF VANCOMYCIN: CPT

## 2020-10-05 PROCEDURE — 25000003 PHARM REV CODE 250: Performed by: PHYSICIAN ASSISTANT

## 2020-10-05 PROCEDURE — 63600175 PHARM REV CODE 636 W HCPCS: Performed by: HOSPITALIST

## 2020-10-05 RX ORDER — LANOLIN ALCOHOL/MO/W.PET/CERES
400 CREAM (GRAM) TOPICAL ONCE
Status: COMPLETED | OUTPATIENT
Start: 2020-10-05 | End: 2020-10-05

## 2020-10-05 RX ADMIN — SODIUM CHLORIDE, SODIUM LACTATE, POTASSIUM CHLORIDE, AND CALCIUM CHLORIDE 1000 ML: .6; .31; .03; .02 INJECTION, SOLUTION INTRAVENOUS at 07:10

## 2020-10-05 RX ADMIN — LOPERAMIDE HYDROCHLORIDE 2 MG: 1 SOLUTION ORAL at 04:10

## 2020-10-05 RX ADMIN — Medication 10 ML: at 08:10

## 2020-10-05 RX ADMIN — GABAPENTIN 250 MG: 250 SOLUTION ORAL at 10:10

## 2020-10-05 RX ADMIN — HEPARIN SODIUM 5000 UNITS: 5000 INJECTION INTRAVENOUS; SUBCUTANEOUS at 09:10

## 2020-10-05 RX ADMIN — CEFEPIME 1 G: 1 INJECTION, POWDER, FOR SOLUTION INTRAMUSCULAR; INTRAVENOUS at 05:10

## 2020-10-05 RX ADMIN — METRONIDAZOLE 500 MG: 500 TABLET ORAL at 09:10

## 2020-10-05 RX ADMIN — DEXTROSE MONOHYDRATE 500 MG: 50 INJECTION, SOLUTION INTRAVENOUS at 06:10

## 2020-10-05 RX ADMIN — IPRATROPIUM BROMIDE AND ALBUTEROL SULFATE 3 ML: .5; 2.5 SOLUTION RESPIRATORY (INHALATION) at 07:10

## 2020-10-05 RX ADMIN — IPRATROPIUM BROMIDE AND ALBUTEROL SULFATE 3 ML: .5; 2.5 SOLUTION RESPIRATORY (INHALATION) at 04:10

## 2020-10-05 RX ADMIN — CEFEPIME 1 G: 1 INJECTION, POWDER, FOR SOLUTION INTRAMUSCULAR; INTRAVENOUS at 02:10

## 2020-10-05 RX ADMIN — HEPARIN SODIUM 5000 UNITS: 5000 INJECTION INTRAVENOUS; SUBCUTANEOUS at 08:10

## 2020-10-05 RX ADMIN — IPRATROPIUM BROMIDE AND ALBUTEROL SULFATE 3 ML: .5; 2.5 SOLUTION RESPIRATORY (INHALATION) at 11:10

## 2020-10-05 RX ADMIN — LOPERAMIDE HYDROCHLORIDE 2 MG: 1 SOLUTION ORAL at 09:10

## 2020-10-05 RX ADMIN — LOPERAMIDE HYDROCHLORIDE 2 MG: 1 SOLUTION ORAL at 01:10

## 2020-10-05 RX ADMIN — IPRATROPIUM BROMIDE AND ALBUTEROL SULFATE 3 ML: .5; 2.5 SOLUTION RESPIRATORY (INHALATION) at 03:10

## 2020-10-05 RX ADMIN — METRONIDAZOLE 500 MG: 500 TABLET ORAL at 08:10

## 2020-10-05 RX ADMIN — Medication 400 MG: at 08:10

## 2020-10-05 RX ADMIN — CEFEPIME 1 G: 1 INJECTION, POWDER, FOR SOLUTION INTRAMUSCULAR; INTRAVENOUS at 10:10

## 2020-10-05 RX ADMIN — DEXTROSE MONOHYDRATE 500 MG: 50 INJECTION, SOLUTION INTRAVENOUS at 01:10

## 2020-10-05 RX ADMIN — BACLOFEN 10 MG: 10 TABLET ORAL at 02:10

## 2020-10-05 RX ADMIN — BACLOFEN 10 MG: 10 TABLET ORAL at 09:10

## 2020-10-05 RX ADMIN — MELATONIN TAB 3 MG 6 MG: 3 TAB at 01:10

## 2020-10-05 RX ADMIN — PROPRANOLOL HYDROCHLORIDE 20 MG: 20 TABLET ORAL at 02:10

## 2020-10-05 RX ADMIN — Medication 1 CAPSULE: at 08:10

## 2020-10-05 RX ADMIN — PROPRANOLOL HYDROCHLORIDE 20 MG: 20 TABLET ORAL at 08:10

## 2020-10-05 RX ADMIN — PHENOBARBITAL 100 MG: 20 ELIXIR ORAL at 09:10

## 2020-10-05 RX ADMIN — Medication 500 MG: at 08:10

## 2020-10-05 RX ADMIN — METRONIDAZOLE 500 MG: 500 TABLET ORAL at 02:10

## 2020-10-05 RX ADMIN — LOPERAMIDE HYDROCHLORIDE 2 MG: 1 SOLUTION ORAL at 08:10

## 2020-10-05 RX ADMIN — PROPRANOLOL HYDROCHLORIDE 20 MG: 20 TABLET ORAL at 09:10

## 2020-10-05 RX ADMIN — IPRATROPIUM BROMIDE AND ALBUTEROL SULFATE 3 ML: .5; 2.5 SOLUTION RESPIRATORY (INHALATION) at 12:10

## 2020-10-05 RX ADMIN — BACLOFEN 10 MG: 10 TABLET ORAL at 08:10

## 2020-10-05 NOTE — PLAN OF CARE
POC reviewed with pt. Seizure precautions initiated. Pt. AAOx4. VSS on RA. Condom cath in place. No complaints of pain. PEG tube. Perma cath CDI. Total care pt. Mom at bedside. Ambulates/repositions independently. Bed in low locked position. Possessions/call light within reach. WCTM

## 2020-10-05 NOTE — PLAN OF CARE
10/05/20 1327   Post-Acute Status   Post-Acute Authorization Placement   Post-Acute Placement Status Referrals Sent   SW sent additional referrals to [facility] Hong, [facility] Crawford, [facility] and [facility].    .Zhanna Paulino, LMSW [facility]  [phone]

## 2020-10-05 NOTE — PROGRESS NOTES
Pharmacokinetic Assessment Follow Up: IV Vancomycin    Vancomycin serum concentration assessment(s):    -Vancomycin random level was drawn ~ 18 hrs from the previous dose and can be used to guide therapy.   -A level of 11.5 mcg/mL is below therapeutic goal 15-20 mcg/mL for sepsis 2/2 PNA/ sacral wound.  -Renal function remains stable, adequate urine output.    Vancomycin Regimen Plan:    -Change vancomycin to 500 mg IV Q8H.  -Obtain a trough prior to the fourth dose on 10/6 @ 1015.   -Monitor for changes in renal function closely.    Drug levels (last 3 results):  Recent Labs   Lab Result Units 10/02/20  1002 10/04/20  0857 10/05/20  0316   Vancomycin, Random ug/mL  --   --  11.5   Vancomycin-Trough ug/mL 12.5 30.1*  --        Pharmacy will continue to follow and monitor vancomycin.    Please contact pharmacy at extension [phone] for questions regarding this assessment.    Thank you for the consult,   Amilcar Gray       Patient brief summary:  Glen Moscoso is a 24 y.o. male initiated on antimicrobial therapy with IV Vancomycin for treatment of bone/joint infection      Drug Allergies:   Review of patient's allergies indicates:  No Known Allergies    Actual Body Weight:   33.4 kg     Renal Function:   Estimated Creatinine Clearance: 136.8 mL/min (A) (based on SCr of 0.4 mg/dL (L)).,     Dialysis Method (if applicable):  N/A    CBC (last 72 hours):  Recent Labs   Lab Result Units 10/03/20  0517 10/03/20  1350 10/04/20  0208 10/05/20  0316   WBC K/uL 11.99 14.23* 16.11* 10.27   Hemoglobin g/dL 9.5* 9.1* 8.8* 8.4*   Hematocrit % 31.1* 30.0* 28.8* 27.8*   Platelets K/uL 735* 702* 673* 618*   Gran% % 75.7* 80.4* 79.4* 76.0*   Lymph% % 11.3* 9.6* 10.8* 13.0*   Mono% % 12.5 9.3 9.1 10.3   Eosinophil% % 0.0 0.0 0.0 0.1   Basophil% % 0.2 0.3 0.3 0.3   Differential Method  Automated Automated Automated Automated       Metabolic Panel (last 72 hours):  Recent Labs   Lab Result Units 10/03/20  0517 10/04/20  0208 10/05/20  0319    Sodium mmol/L 137 139 135*   Potassium mmol/L 4.1 3.4* 3.7   Chloride mmol/L 101 102 103   CO2 mmol/L 27 26 24   Glucose mg/dL 119* 99 116*   Glucose, UA   --  Negative  --    BUN, Bld mg/dL 9 8 7   Creatinine mg/dL 0.5 0.4* 0.4*   Albumin g/dL 2.2* 2.2* 2.0*   Total Bilirubin mg/dL 0.2 0.2 0.1   Alkaline Phosphatase U/L 89 89 79   AST U/L 11 12 15   ALT U/L 9* 8* 8*   Magnesium mg/dL 1.8 1.8 1.7   Phosphorus mg/dL 2.9 3.5 3.3       Vancomycin Administrations:  vancomycin given in the last 96 hours                   vancomycin 1.25 g in dextrose 5% 250 mL IVPB (ready to mix) (mg) 1,250 mg New Bag 10/04/20 0919     1,250 mg New Bag 10/03/20 2118     1,250 mg New Bag  0906     1,250 mg New Bag 10/02/20 2157    vancomycin in dextrose 5 % 1 gram/250 mL IVPB 1,000 mg (mg) 1,000 mg New Bag 10/02/20 0941     1,000 mg New Bag 10/01/20 2144                Microbiologic Results:  Microbiology Results (last 7 days)     Procedure Component Value Units Date/Time    Culture, Respiratory with Gram Stain [[id number]]  (Abnormal) Collected: 10/03/20 1749    Order Status: Completed Specimen: Respiratory from Endotracheal Aspirate Updated: 10/05/20 0730     Respiratory Culture GRAM NEGATIVE DIETER  Few  Identification and susceptibility pending       Gram Stain (Respiratory) <10 epithelial cells per low power field.     Gram Stain (Respiratory) Rare WBC's     Gram Stain (Respiratory) Rare budding yeast    Narrative:      Mini-BAL. Before antibiotics.    Blood culture [[id number]] Collected: 10/04/20 0133    Order Status: Completed Specimen: Blood from Peripheral, Lower Arm, Left Updated: 10/05/20 0613     Blood Culture, Routine No Growth to date      No Growth to date    Narrative:      Blood cultures x 2 different sites. 4 bottles total. Please  draw cultures before administering antibiotics.    Blood culture [[id number]] Collected: 10/03/20 1515    Order Status: Completed Specimen: Blood from Peripheral, Hand, Right Updated: 10/04/20  1612     Blood Culture, Routine No Growth to date      No Growth to date    Narrative:      Blood cultures from 2 different sites. 4 bottles total.  Please draw before starting antibiotics.    Blood culture [[id number]] Collected: 09/24/20 0533    Order Status: Completed Specimen: Blood Updated: 09/29/20 1012     Blood Culture, Routine No growth after 5 days.    Blood culture [[id number]] Collected: 09/24/20 0533    Order Status: Completed Specimen: Blood Updated: 09/29/20 1012     Blood Culture, Routine No growth after 5 days.

## 2020-10-05 NOTE — PLAN OF CARE
Patient's vital signs have remained stable throughout shift.  Dressing changes were done and patient turned Q2 hours.  Patient seems comfortable.  Will continue to monitor.

## 2020-10-05 NOTE — PROGRESS NOTES
Progress Note   Hospital Medicine         Patient Name: Glen Moscoso  MRN:  [id number]  [facility] Medicine Team: [facility] MED K Quiana Armenta MD  Date of Admission:  9/20/2020     Length of Stay:  LOS: 15 days   Expected Discharge Date: 10/2/2020  Principal Problem:  Acute hypoxemic respiratory failure       Subjective:     Interval History/Overnight Events:    Seen at bedside in AM and again in later AM with dmitriy dsouza at bedside. Breathing much better, still mild tachy but on room air, no resp distress, no secetions like previous days, much more comfortable appearing overall on exam. discsused aspiration and unclear why he had decompensation as the EEG negative and roving eye moetions were push buttoned and he didn't have any seizures during those episodes in icu either. Would watch now closely at least another day or two here given episodes unxpected over weekend to ensure isnt having reoccurence, discussed if tchy persists, may consider cta but would not send now given recent decompensation would want to ensure is table resp wise another day ir so and if tachy persists as has other reasons higher for tach including aspiration now.  Mom on board with plan, she will be here this evening and likely go home later today for financial reasons to go to bank and back tomorrow. Nursing updated on plans today. CM at bedside to work on ltach referrals but would trend exam at least until mid-late week now          Review of Systems   Unable to assess secondary to condition.    Objective:     Temp:  [97.8 °F (36.6 °C)-99.9 °F (37.7 °C)]   Pulse:  []   Resp:  [10-31]   BP: (107-152)/()   SpO2:  [96 %-100 %]       Physical Exam:  Constitutional: near baselin from previous, comfortable. Mild increased RR but on rA, stable. Closer to previous days.  Head: Normocephalic and atraumatic.   Mouth/Throat: Oropharynx is clear and moist.   Eyes: EOM are normal. Pupils are equal, round, and reactive to light. No scleral  icterus.   Neck: Normal range of motion. Neck supple.   Cardiovascular: tachy at 110.  No murmur heard.  Pulmonary/Chest: mild increased  RR, no resp distress, calm breathing, lung sounds equal Right chest all permacath, covered in tegrederm.   Abdominal: Soft. No grimacing Bowel sounds are normal.  No distension or tenderness. PEG in place. Chronic redness mild.   Musculoskeletal: contractures in arms, legs.   Neurological: non communicative, baseline.  Skin: Skin is warm and dry.   Psychiatric: at baseline, noncommunicative.back to baseline, not agitated.    Recent Labs   Lab 10/01/20  0339 10/02/20  0405 10/03/20  0517 10/03/20  1350 10/04/20  0208 10/05/20  0316   WBC 10.91 10.13 11.99 14.23* 16.11* 10.27   HGB 9.6* 9.3* 9.5* 9.1* 8.8* 8.4*   HCT 31.2* 30.3* 31.1* 30.0* 28.8* 27.8*   * 710* 735* 702* 673* 618*     Recent Labs   Lab 10/03/20  0517 10/04/20  0208 10/05/20  0316    139 135*   K 4.1 3.4* 3.7    102 103   CO2 27 26 24   BUN 9 8 7   CREATININE 0.5 0.4* 0.4*   * 99 116*   CALCIUM 9.1 8.8 8.5*   MG 1.8 1.8 1.7   PHOS 2.9 3.5 3.3     Recent Labs   Lab 10/03/20  0517 10/04/20  0208 10/05/20  0316   ALKPHOS 89 89 79   ALT 9* 8* 8*   AST 11 12 15   ALBUMIN 2.2* 2.2* 2.0*   PROT 7.1 6.9 6.5   BILITOT 0.2 0.2 0.1     Recent Labs   Lab 09/30/20  1104   POCTGLUCOSE 160*        albuterol-ipratropium  3 mL Nebulization Q4H    ascorbic acid (vitamin C)  500 mg Per G Tube Daily    baclofen  10 mg Per G Tube TID    ceFEPime (MAXIPIME) IVPB  1 g Intravenous Q8H    gabapentin  250 mg Per G Tube QHS    heparin (porcine)  5,000 Units Subcutaneous Q12H    lactated ringers  1,000 mL Intravenous Once    Lactobacillus rhamnosus GG  1 capsule Per G Tube Daily    loperamide  2 mg Per G Tube QID    magnesium oxide  400 mg Per G Tube Once    metroNIDAZOLE  500 mg Per G Tube TID    multivitamin liquid no.118  10 mL Per G Tube Daily    PHENobarbitaL  100 mg Per G Tube QHS    propranoloL  20  mg Per G Tube TID       Assessment and Plan     Mr. Glen Moscoso is a 24 y.o. male who presented to [facility] on 9/20/2020 with     Sepsis  Pneumoperitoneum  Colitis  -fever again after 48 hours fever free overnight to 100.7 x 1. WBC stable at 10 still, CRP 58, procal neg. Recently imaged without signs of infection. HR low 100s. Will trend, given had fevers last admit and dc with very quick readmit will cautiously watch over weekend even if LTAC ready, low threshold ID consult again if persists.   · Patient was afebrile with stable WBC with resolution of sepsis until patient had temp of 100.9 F and slight increase in WBC to 12 o 9/30--> iproved to 10 now, afebrile overnight, CT chset/abd with no signs of pneumonia, no signs of any worsening infections, pneumoperitoneum resolved. Continue broad spectrum per ID recs, has permacath for antibitoics given difficult IV access from contractures  · LTAC and CM/SW working on placement for patient for continued medical care.   · General surgery consulted on admit for pneumoperitoneum seen on admit CT scan of abdomen and pelvis. General surgery felt likely contained leak and very small bowel perforation related to recent colitis. Due to patient's very poor functional level at baseline and malnutrition felt to be very poor surgical candidate and recommended conservative.  · ID consulted and recommended IV Vancomycin, Cefepime and Flagyl to treat both small perforation and osteomyelitis of pelvic area and to extend abx until 10/22. IR to place tunneled catheter on 9/28 for long term antibiotics as current midline cannot be extended that long in future and bedside PICC team unable to place bedside PICC due to severe arm contractures.   · ID recommends repeat CT scan of abdomen to re-evaluate perforation in 2-3 weeks or sooner if any acute worsening (done on 9/30 and improved)    ACute hypoxemic respiratory failure  Aspiration pneumonia  Pneumonitis  -rapid response and episode on  "10/3 x 2  -CXR infiltrates, positioning vs pneumonitis on repeat 10/4. To ICU, nebs, suctioning after NRB and copious secretions  -improved rapidly tehre from 10L to 2L, stepped down on 10/4 after watching overnigt  -on RA now. Continue very close oral and resp care now, no adjustments to antibiotics  -wbc 16-->10. resp Cx with rare yeast  -blood Cx ng             Decubitus ulcer of ischial area, left, stage IV  Pressure injury of right ischium, stage 4  Osteomyelitis of pelvis  · Controlled. Wounds are improving. Continue current wound care (following in house known well to their team)  · Wounds appear clean with no definite signs of infection.   · As per wound care "Nursing to cleanse scrotal skin breakdown and R and L ischial pressure injuries with wound cleanser. Pack ischial pressure injuries with Triad ointment and gauze, then cover bordered foam dressing to prove autolytic debridement and prevent breakdown of intact skin. Apply Triad ointment over scrotal skin breakdown."  · Patient now with exposed bone to ischial areas and ID with concern for underlying osteomyelitis even though wounds themselves do not appear infected so recommend extension of long term abx with IV Vancomycin, Cefepime and po Flagyl until at least 10/22. IR placed tunneled line on 9/28 for long term antibiotics to right chest wall.      Severe protein-calorie malnutrition  · Present on admit and related to colitis, recent medical illnesses. Patient with PEG and receiving TF with Prebio to help with nutrition. Likely cause of poor wound healing and development of ischial ulcers.   · Nutrition consulted and following and continue Peptamen with Prebio at 40 mL/hr as per nutrition recs to treat malnutrition. at goal now on feeds  10/1.        Seizure disorder  Chronic and controlled and continue Phenobarbital to treat.   -concern may be having subclinical seizures during rapid on 10/3, EEG was negative, push events for eye motions x 3 were " also not seizures on eeg.        Anemia of chronic disease  Chronic and controlled and related to chronic infection, poor nutrition and poor baseline function status. Monitor anemia with daily CBC and transfuse if Hgb < 7.   -stable at mid 9's now. Required 1 tx on last admit and stable since        Hypophosphatemia  · Replace PRN     Cerebral palsy  Chronic condition. Patient with poor baseline functional status and non-communicative and bed bound/wheelchair bound with extremity contractures to all extremities at baseline.   Requires 24/7 care at home by mom who is very involved in his care at baseline    Tachycardia  -chronic due to CP and reflex tachy issues, on home BB  -given sedentary more than usual in hospital now and fevers, will check D dimer  (1..0) and repeat echo for any signs for PE. Had UE LE 2 weeks ago negative for fever. Has had superficial clots in UE in last month from lines  -if fever and tachy persist, will check CTA this weekend. Will resume at least px lovenox now.  -wells score of 3, moderate risk.  10/3: possible aspiration causing this the last 2 days, unclear, is still tachy but had events today for this also  10/5: still occurring at times  overnight. LE U/S neg for DVT in ICU. May be subclinical aspiration still going on. Still have low threshold to consider CTA but considering recent episode of aspiration, would hold off for now at least as still other reasons given recent aspiration as cause as do not want to risk event in scanner, not convincing enough for FD lovenox yet either. Trend for now      Diet:  tF resumed in ICU  DVT PPx:  Teds/scds. lovenox. If signs of PE persist, will check further.      Disposition: can send ltac referrals again as we were still working on accepting facility but need to watch a few days given events of Saturday to ensure resp status is stable but send referrals as was difficult placing last week when stable    Discharge Planning   ROCÍO: 10/8/2020      Code Status: Full Code   Is the patient medically ready for discharge?: Yes    Reason for patient still in hospital (select all that apply): Patient trending condition  Discharge Plan A: Long-term acute care facility (LTAC)   Discharge Delays: None known at this time

## 2020-10-05 NOTE — PLAN OF CARE
Patient is not medically ready for discharge.      10/05/20 1444   Discharge Reassessment   Assessment Type Discharge Planning Reassessment   Discharge Plan A Long-term acute care facility (LTAC)   Discharge Plan B Home Health   DME Needed Upon Discharge  none   Anticipated Discharge Disposition LTAC   Can the patient/caregiver answer the patient profile reliably? No, cognitively impaired   Regina Knott RN, CM   Ext: [phone]

## 2020-10-05 NOTE — ASSESSMENT & PLAN NOTE
25 yo male with CP with recent admit for respiratory failure and pseudomonas/serratia PNA, ischial ulcers, chronic diarrhea s/p flex sig on last admit without significant findings who completed 14 days of zosyn for PNA and soft tissue wounds who was readmitted 9/20 with fever.  Pt broadened to Vanc cefepime and flagyl.       CT imaging at that time showed new small volume pneumoperitoneum.  Source was suspected from intestinal perforation possibly secondary to prior flex sig.  Pt seen by General Surgery.  No intervention recommended.         Wounds on this admit noted to have exposed bone but did not appear infected. WBC and fever normalized.  ID saw the pt intially and recommended to continue broad therapy of Vanc, cefepime and flagyl to cover for prior PNA cx, wound osteo, and GI perf for 4 weeks (est end date 10/22). Tunneled catheter  placed 9/28.     Developed fever again on 9/29, persisting through 10/3.  A repeat CT C/A/P on 9/30 showed interval resolution of pneumoperitoneum.  No evidence of bowel obstruction.  Mild circumferential wall thickening of the ascending colon, similar to most recent prior exam.  No significant pericolonic fat stranding.   Also showed Ill-defined ground-glass opacity in the right lower lobe which could represent atelectatic changes.   No focal consolidation, pleural effusion or pneumothorax.         ID reconsulted 10/3 for persistent fever.   A CXR of 10/3 concerning for some perihilar opacities ? Aspiration, but repeat of 10/4 shows lungs are clear and without focal consolidation  Previously noted perihilar opacities likely represented artifact.    White count WNL.  Procal wnl.  CRP 56.8 (downtrendind- 9/.5).       Remains afebrile X 48 hours.  Repeat blood cultures NGTD.  Repeat respiratory culture again showing Serratia.  BLE US negative for DVT. On room air, O2 sats 98%.  Mild tachycardia. No distress.      Plan:  - Continue current vancomycin (Pharmacy to dose),  cefepime, and flagyl.   - Anticipate continuing current abx through original end date of 10/22, but will follow up pending Serratia susceptibilities in respiratory culture to ensure no changes necessary.   - Will follow up tomorrow.     Data reviewed and plan discussed with ID staff

## 2020-10-05 NOTE — NURSING TRANSFER
Nursing Transfer Note      10/5/2020     Transfer To: 1003    Transfer via bed    Transfer with cardiac monitoring    Transported by RNx2    Medicines sent: Yes    Chart send with patient: Yes    Notified: Mom at bedside    Upon arrival to floor: receiving RN at bedside

## 2020-10-06 LAB
ALBUMIN SERPL BCP-MCNC: 2 G/DL (ref 3.5–5.2)
ALP SERPL-CCNC: 80 U/L (ref 55–135)
ALT SERPL W/O P-5'-P-CCNC: 9 U/L (ref 10–44)
ANION GAP SERPL CALC-SCNC: 9 MMOL/L (ref 8–16)
AST SERPL-CCNC: 12 U/L (ref 10–40)
BACTERIA SPEC AEROBE CULT: ABNORMAL
BACTERIA SPEC AEROBE CULT: ABNORMAL
BASOPHILS # BLD AUTO: 0.02 K/UL (ref 0–0.2)
BASOPHILS NFR BLD: 0.2 % (ref 0–1.9)
BILIRUB SERPL-MCNC: 0.1 MG/DL (ref 0.1–1)
BILIRUB UR QL STRIP: NEGATIVE
BUN SERPL-MCNC: 7 MG/DL (ref 6–20)
CALCIUM SERPL-MCNC: 8.3 MG/DL (ref 8.7–10.5)
CHLORIDE SERPL-SCNC: 102 MMOL/L (ref 95–110)
CLARITY UR REFRACT.AUTO: CLEAR
CO2 SERPL-SCNC: 26 MMOL/L (ref 23–29)
COLOR UR AUTO: ABNORMAL
CREAT SERPL-MCNC: 0.5 MG/DL (ref 0.5–1.4)
DIFFERENTIAL METHOD: ABNORMAL
EOSINOPHIL # BLD AUTO: 0 K/UL (ref 0–0.5)
EOSINOPHIL NFR BLD: 0 % (ref 0–8)
ERYTHROCYTE [DISTWIDTH] IN BLOOD BY AUTOMATED COUNT: 13.6 % (ref 11.5–14.5)
EST. GFR  (AFRICAN AMERICAN): >60 ML/MIN/1.73 M^2
EST. GFR  (NON AFRICAN AMERICAN): >60 ML/MIN/1.73 M^2
GLUCOSE SERPL-MCNC: 118 MG/DL (ref 70–110)
GLUCOSE UR QL STRIP: NEGATIVE
GRAM STN SPEC: ABNORMAL
HCT VFR BLD AUTO: 26.9 % (ref 40–54)
HGB BLD-MCNC: 8.2 G/DL (ref 14–18)
HGB UR QL STRIP: NEGATIVE
IMM GRANULOCYTES # BLD AUTO: 0.03 K/UL (ref 0–0.04)
IMM GRANULOCYTES NFR BLD AUTO: 0.3 % (ref 0–0.5)
KETONES UR QL STRIP: NEGATIVE
LEUKOCYTE ESTERASE UR QL STRIP: NEGATIVE
LYMPHOCYTES # BLD AUTO: 1.4 K/UL (ref 1–4.8)
LYMPHOCYTES NFR BLD: 14 % (ref 18–48)
MAGNESIUM SERPL-MCNC: 1.7 MG/DL (ref 1.6–2.6)
MCH RBC QN AUTO: 29.7 PG (ref 27–31)
MCHC RBC AUTO-ENTMCNC: 30.5 G/DL (ref 32–36)
MCV RBC AUTO: 98 FL (ref 82–98)
MONOCYTES # BLD AUTO: 1.1 K/UL (ref 0.3–1)
MONOCYTES NFR BLD: 10.7 % (ref 4–15)
NEUTROPHILS # BLD AUTO: 7.6 K/UL (ref 1.8–7.7)
NEUTROPHILS NFR BLD: 74.8 % (ref 38–73)
NITRITE UR QL STRIP: NEGATIVE
NRBC BLD-RTO: 0 /100 WBC
PH UR STRIP: 7 [PH] (ref 5–8)
PHOSPHATE SERPL-MCNC: 2.3 MG/DL (ref 2.7–4.5)
PLATELET # BLD AUTO: 634 K/UL (ref 150–350)
PMV BLD AUTO: 9.1 FL (ref 9.2–12.9)
POTASSIUM SERPL-SCNC: 3.6 MMOL/L (ref 3.5–5.1)
PROT SERPL-MCNC: 6.2 G/DL (ref 6–8.4)
PROT UR QL STRIP: NEGATIVE
RBC # BLD AUTO: 2.76 M/UL (ref 4.6–6.2)
SODIUM SERPL-SCNC: 137 MMOL/L (ref 136–145)
SP GR UR STRIP: 1 (ref 1–1.03)
URN SPEC COLLECT METH UR: ABNORMAL
VANCOMYCIN TROUGH SERPL-MCNC: 10.9 UG/ML (ref 10–22)
WBC # BLD AUTO: 10.13 K/UL (ref 3.9–12.7)

## 2020-10-06 PROCEDURE — 80202 ASSAY OF VANCOMYCIN: CPT

## 2020-10-06 PROCEDURE — 94640 AIRWAY INHALATION TREATMENT: CPT

## 2020-10-06 PROCEDURE — 80053 COMPREHEN METABOLIC PANEL: CPT

## 2020-10-06 PROCEDURE — 83735 ASSAY OF MAGNESIUM: CPT

## 2020-10-06 PROCEDURE — 94761 N-INVAS EAR/PLS OXIMETRY MLT: CPT

## 2020-10-06 PROCEDURE — 25000003 PHARM REV CODE 250: Performed by: HOSPITALIST

## 2020-10-06 PROCEDURE — 25000003 PHARM REV CODE 250: Performed by: INTERNAL MEDICINE

## 2020-10-06 PROCEDURE — 94668 MNPJ CHEST WALL SBSQ: CPT

## 2020-10-06 PROCEDURE — 63600175 PHARM REV CODE 636 W HCPCS: Performed by: PHYSICIAN ASSISTANT

## 2020-10-06 PROCEDURE — 63600175 PHARM REV CODE 636 W HCPCS: Performed by: INTERNAL MEDICINE

## 2020-10-06 PROCEDURE — 25000242 PHARM REV CODE 250 ALT 637 W/ HCPCS: Performed by: NURSE PRACTITIONER

## 2020-10-06 PROCEDURE — 99233 SBSQ HOSP IP/OBS HIGH 50: CPT | Mod: ,,, | Performed by: NURSE PRACTITIONER

## 2020-10-06 PROCEDURE — 99232 PR SUBSEQUENT HOSPITAL CARE,LEVL II: ICD-10-PCS | Mod: ,,, | Performed by: HOSPITALIST

## 2020-10-06 PROCEDURE — 63600175 PHARM REV CODE 636 W HCPCS: Performed by: HOSPITALIST

## 2020-10-06 PROCEDURE — 51798 US URINE CAPACITY MEASURE: CPT

## 2020-10-06 PROCEDURE — 99233 PR SUBSEQUENT HOSPITAL CARE,LEVL III: ICD-10-PCS | Mod: ,,, | Performed by: NURSE PRACTITIONER

## 2020-10-06 PROCEDURE — 63600175 PHARM REV CODE 636 W HCPCS: Performed by: NURSE PRACTITIONER

## 2020-10-06 PROCEDURE — 85025 COMPLETE CBC W/AUTO DIFF WBC: CPT

## 2020-10-06 PROCEDURE — 81003 URINALYSIS AUTO W/O SCOPE: CPT

## 2020-10-06 PROCEDURE — 99232 SBSQ HOSP IP/OBS MODERATE 35: CPT | Mod: ,,, | Performed by: HOSPITALIST

## 2020-10-06 PROCEDURE — 20600001 HC STEP DOWN PRIVATE ROOM

## 2020-10-06 PROCEDURE — 84100 ASSAY OF PHOSPHORUS: CPT

## 2020-10-06 RX ORDER — MUPIROCIN 20 MG/G
OINTMENT TOPICAL 2 TIMES DAILY
Status: DISCONTINUED | OUTPATIENT
Start: 2020-10-06 | End: 2020-10-09 | Stop reason: HOSPADM

## 2020-10-06 RX ADMIN — CEFEPIME 1 G: 1 INJECTION, POWDER, FOR SOLUTION INTRAMUSCULAR; INTRAVENOUS at 06:10

## 2020-10-06 RX ADMIN — Medication 10 ML: at 10:10

## 2020-10-06 RX ADMIN — METRONIDAZOLE 500 MG: 500 TABLET ORAL at 09:10

## 2020-10-06 RX ADMIN — LOPERAMIDE HYDROCHLORIDE 2 MG: 1 SOLUTION ORAL at 09:10

## 2020-10-06 RX ADMIN — DEXTROSE MONOHYDRATE 500 MG: 50 INJECTION, SOLUTION INTRAVENOUS at 11:10

## 2020-10-06 RX ADMIN — METRONIDAZOLE 500 MG: 500 TABLET ORAL at 03:10

## 2020-10-06 RX ADMIN — IPRATROPIUM BROMIDE AND ALBUTEROL SULFATE 3 ML: .5; 2.5 SOLUTION RESPIRATORY (INHALATION) at 07:10

## 2020-10-06 RX ADMIN — GABAPENTIN 250 MG: 250 SOLUTION ORAL at 09:10

## 2020-10-06 RX ADMIN — LOPERAMIDE HYDROCHLORIDE 2 MG: 1 SOLUTION ORAL at 04:10

## 2020-10-06 RX ADMIN — METRONIDAZOLE 500 MG: 500 TABLET ORAL at 08:10

## 2020-10-06 RX ADMIN — CEFEPIME 1 G: 1 INJECTION, POWDER, FOR SOLUTION INTRAMUSCULAR; INTRAVENOUS at 02:10

## 2020-10-06 RX ADMIN — CEFEPIME 1 G: 1 INJECTION, POWDER, FOR SOLUTION INTRAMUSCULAR; INTRAVENOUS at 10:10

## 2020-10-06 RX ADMIN — MUPIROCIN: 20 OINTMENT TOPICAL at 09:10

## 2020-10-06 RX ADMIN — BACLOFEN 10 MG: 10 TABLET ORAL at 08:10

## 2020-10-06 RX ADMIN — HEPARIN SODIUM 5000 UNITS: 5000 INJECTION INTRAVENOUS; SUBCUTANEOUS at 09:10

## 2020-10-06 RX ADMIN — SODIUM CHLORIDE, SODIUM LACTATE, POTASSIUM CHLORIDE, AND CALCIUM CHLORIDE 1000 ML: .6; .31; .03; .02 INJECTION, SOLUTION INTRAVENOUS at 03:10

## 2020-10-06 RX ADMIN — BACLOFEN 10 MG: 10 TABLET ORAL at 09:10

## 2020-10-06 RX ADMIN — IPRATROPIUM BROMIDE AND ALBUTEROL SULFATE 3 ML: .5; 2.5 SOLUTION RESPIRATORY (INHALATION) at 04:10

## 2020-10-06 RX ADMIN — PROPRANOLOL HYDROCHLORIDE 20 MG: 20 TABLET ORAL at 03:10

## 2020-10-06 RX ADMIN — VANCOMYCIN HYDROCHLORIDE 750 MG: 750 INJECTION, POWDER, LYOPHILIZED, FOR SOLUTION INTRAVENOUS at 06:10

## 2020-10-06 RX ADMIN — Medication 500 MG: at 08:10

## 2020-10-06 RX ADMIN — DEXTROSE MONOHYDRATE 500 MG: 50 INJECTION, SOLUTION INTRAVENOUS at 02:10

## 2020-10-06 RX ADMIN — HEPARIN SODIUM 5000 UNITS: 5000 INJECTION INTRAVENOUS; SUBCUTANEOUS at 08:10

## 2020-10-06 RX ADMIN — LOPERAMIDE HYDROCHLORIDE 2 MG: 1 SOLUTION ORAL at 01:10

## 2020-10-06 RX ADMIN — IPRATROPIUM BROMIDE AND ALBUTEROL SULFATE 3 ML: .5; 2.5 SOLUTION RESPIRATORY (INHALATION) at 12:10

## 2020-10-06 RX ADMIN — IPRATROPIUM BROMIDE AND ALBUTEROL SULFATE 3 ML: .5; 2.5 SOLUTION RESPIRATORY (INHALATION) at 08:10

## 2020-10-06 RX ADMIN — PROPRANOLOL HYDROCHLORIDE 20 MG: 20 TABLET ORAL at 09:10

## 2020-10-06 RX ADMIN — PROPRANOLOL HYDROCHLORIDE 20 MG: 20 TABLET ORAL at 08:10

## 2020-10-06 RX ADMIN — IPRATROPIUM BROMIDE AND ALBUTEROL SULFATE 3 ML: .5; 2.5 SOLUTION RESPIRATORY (INHALATION) at 03:10

## 2020-10-06 RX ADMIN — MUPIROCIN: 20 OINTMENT TOPICAL at 08:10

## 2020-10-06 RX ADMIN — BACLOFEN 10 MG: 10 TABLET ORAL at 03:10

## 2020-10-06 RX ADMIN — LOPERAMIDE HYDROCHLORIDE 2 MG: 1 SOLUTION ORAL at 08:10

## 2020-10-06 RX ADMIN — PHENOBARBITAL 100 MG: 20 ELIXIR ORAL at 09:10

## 2020-10-06 RX ADMIN — Medication 1 CAPSULE: at 08:10

## 2020-10-06 NOTE — PROGRESS NOTES
[facility]  Infectious Disease  Progress Note    Patient Name: Glen Moscoso  MRN: [id number]  Admission Date: 9/20/2020  Length of Stay: 16 days  Attending Physician: Quiana Armenta MD  Primary Care Provider: Yadi Lawson MD    Isolation Status: No active isolations  Assessment/Plan:      Fever     23 yo male with CP with recent admit for respiratory failure and pseudomonas/serratia PNA, ischial ulcers, chronic diarrhea s/p flex sig on last admit without significant findings who completed 14 days of zosyn for PNA and soft tissue wounds who was readmitted 9/20 with fever.  Pt broadened to Vanc cefepime and flagyl.       CT imaging at that time showed new small volume pneumoperitoneum.  Source was suspected from intestinal perforation possibly secondary to prior flex sig.  Pt seen by General Surgery.  No intervention recommended.         Wounds on this admit noted to have exposed bone but did not appear infected. WBC and fever normalized.  ID saw the pt intially and recommended to continue broad therapy of Vanc, cefepime and flagyl to cover for prior PNA cx, wound osteo, and GI perf for 4 weeks (est end date 10/22). Tunneled catheter  placed 9/28.     Developed fever again on 9/29, persisting through 10/3.  A repeat CT C/A/P on 9/30 showed interval resolution of pneumoperitoneum.  No evidence of bowel obstruction.  Mild circumferential wall thickening of the ascending colon, similar to most recent prior exam.  No significant pericolonic fat stranding.   Also showed Ill-defined ground-glass opacity in the right lower lobe which could represent atelectatic changes.   No focal consolidation, pleural effusion or pneumothorax.         ID reconsulted 10/3 for persistent fever.   A CXR of 10/3 concerning for some perihilar opacities ? Aspiration, but repeat of 10/4 shows lungs are clear and without focal consolidation  Previously noted perihilar opacities likely represented artifact.    Wound Care  last assessed 9/30 and wounds noted to be improving.  White count WNL.  Procal wnl.  CRP 56.8 (downtrendind- 9/.5).       Remains afebrile X 72 hours.  Repeat blood cultures NGTD.  Repeat respiratory culture  showing recurrent Serratia, that remains Cefepime S.  Has been present since late August.  Likely chronic colonization.  BLE US negative for DVT. Remains on room air, O2 sats 98%.  Mild tachycardia. No distress.      Plan:  - Continue current vancomycin (Pharmacy to dose; trough goal 15-20), cefepime 2 grams IV q 8 hours, and flagyl 500 mg per G tube q 8 hours.   Estimated end date 10/22/20.    - Weekly cbc, cmp, crp, esr and fax results to ID, attention Wayne Bazan NP, at fax [phone]. (Pager [phone], spectra [phone], office [phone])  - Will need continue aggressive wound care, nutritional support, and IV abx. Recommend LTAC  - ID follow up at end of care.  If patient goes to [facility], please consult ID there for follow up.   - Will sign off.  Please call or re-consult as needed.     Data reviewed and plan discussed with ID staff  Discussed with Primary Team        Thank you.   Please call for any questions or concerns.  NANI Romo, ANP-C  [phone] pager, [facility] [phone]    Subjective:     Principal Problem:Acute hypoxemic respiratory failure    HPI: Mr. Glen Moscoso is a 24 y.o. male with CP, complicated by seizures and a peg tube, who presents to the ER for evaluation of fever.  He just had a prolonged and complex hospital stay at [facility] from 8/26-9/18 for seizures and pneumonia.  He was originally admitted to [facility] with respiratory failure, where he was found to have Pseudomonas and Serratia pneumonia, as well as a decubitus ulcer that was debrided by Gen Surg on 9/7 - cultures were not sent.  He had repeat resp cultures that showed Serratia.  He had had a CT ABD/plevis during that visit that suggested proctocolitis but the visualized osseous structures appear stable with chronic appearing  deformity of the bilateral femora and acetabula.  Skeletal structures otherwise intact without evidence of erosive change. He was followed by ID.  He was discharged home 9/18 on Zosyn via LUE PICC until 9/24 (to complete 2 weeks).      Family at bedside on admission reported he was doing well until the day of admission when he started to have fevers up to 101.2F.  They denied any new respiratory complaints.  Given his recent hospitalization and fever while on antibiotics, they brought him to the ER for further evaluation.     Upon arrival to the ER, vitals were temp 101.2F, , RR 24 and /75.  Labs showed WBC 17 with ESR 90 and Procal 0.56.  CXR didn't show a new consolidation.  His case was discussed with ID on call, who suggested Vanc, Cefepime, and Flagyl based on his previous cultures, as well as CT chest/abdomen/pelvis to include sacral wounds.  He was admitted to Hospital Medicine for further management and ID now consulted for fever despite being on ABX and no clears source.    Interval History:  Patient has undergone a CT CAP and colitis, a small amount of pneumoperitoneum found likely from perforation, chronic changes of the pelvic bones, and mild retained secretions in the left mainstem bronchus with artial opacification of the left lower lobe bronchus, but no significant airspace disease in the left lower lobe.  .  Primary team has G tube check and no extravasation seen and case discussed with Gen Tamez who felt patient jhonathan had small perforation and would monitor clinically.  Febrile to 101.2 on admit and now afebrile.  WBC 16 on admit now 11. Blood cultures NGTD.      Interval History: No acute events overnight.  Remains afebrile, no leukocytosis.  Remains on room air, O2 sats 98%.  Tachycardic, but HDS.  Respiratory cx again showing Serratia - remains cefepime S.     Review of Systems   Unable to perform ROS: Patient nonverbal     Objective:     Vital Signs (Most Recent):  Temp: 98.4 °F (36.9  °C) (10/06/20 0713)  Pulse: 96 (10/06/20 0739)  Resp: 16 (10/06/20 0739)  BP: 116/68 (10/06/20 0713)  SpO2: 100 % (10/06/20 0902) Vital Signs (24h Range):  Temp:  [98.1 °F (36.7 °C)-99.6 °F (37.6 °C)] 98.4 °F (36.9 °C)  Pulse:  [] 96  Resp:  [16-22] 16  SpO2:  [96 %-100 %] 100 %  BP: (116-136)/(68-95) 116/68     Weight: 34 kg (74 lb 15.3 oz)  Body mass index is 16.22 kg/m².    Estimated Creatinine Clearance: 111.4 mL/min (based on SCr of 0.5 mg/dL).    Physical Exam  Vitals signs and nursing note reviewed.   Constitutional:       General: He is not in acute distress.     Appearance: He is well-developed. He is cachectic. He is ill-appearing (Chronic).   Eyes:      Conjunctiva/sclera: Conjunctivae normal.   Neck:      Vascular: No JVD.   Cardiovascular:      Rate and Rhythm: Normal rate and regular rhythm.      Heart sounds: Normal heart sounds. No murmur. No friction rub. No gallop.    Pulmonary:      Effort: Pulmonary effort is normal. No respiratory distress.      Breath sounds: No wheezing.      Comments: On room air  Abdominal:      General: Abdomen is flat. Bowel sounds are normal. There is no distension.      Palpations: Abdomen is soft.      Tenderness: There is no abdominal tenderness. There is no guarding.      Comments: PEG tube in place - c/d/i   Skin:     Capillary Refill: Capillary refill takes less than 2 seconds.      Findings: No erythema or rash.      Comments: Tunneled cathter RU chest - c/d/i   Psychiatric:         Speech: He is noncommunicative.         Behavior: Behavior is cooperative.         Significant Labs:   Blood Culture:   Recent Labs   Lab 09/20/20  1853 09/20/20  1854 09/24/20  0533 10/03/20  1515 10/04/20  0133   LABBLOO No growth after 5 days. No growth after 5 days. No growth after 5 days.  No growth after 5 days. No Growth to date  No Growth to date  No Growth to date No Growth to date  No Growth to date  No Growth to date     CBC:   Recent Labs   Lab 10/05/20  1774  10/06/20  0259   WBC 10.27 10.13   HGB 8.4* 8.2*   HCT 27.8* 26.9*   * 634*     CMP:   Recent Labs   Lab 10/05/20  0316 10/06/20  0259   * 137   K 3.7 3.6    102   CO2 24 26   * 118*   BUN 7 7   CREATININE 0.4* 0.5   CALCIUM 8.5* 8.3*   PROT 6.5 6.2   ALBUMIN 2.0* 2.0*   BILITOT 0.1 0.1   ALKPHOS 79 80   AST 15 12   ALT 8* 9*   ANIONGAP 8 9   EGFRNONAA >60.0 >60.0     Respiratory Culture:   Recent Labs   Lab 08/26/20  0505 08/31/20  2157 09/06/20  1103 10/03/20  1749   GSRESP <10 epithelial cells per low power field.  Moderate WBC's  Many Gram negative rods  Many Gram negative diplococci <10 epithelial cells per low power field.  Rare WBC's  Rare Gram negative rods >10 epithelial cells per low power field  Rare WBC's  Rare Gram negative rods <10 epithelial cells per low power field.  Rare WBC's  Rare budding yeast   RESPIRATORYC No S aureus isolated.  SERRATIA MARCESCENS  Moderate  Normal respiratory jose also present  *  PSEUDOMONAS AERUGINOSA  Moderate  * No S aureus or Pseudomonas isolated.  SERRATIA MARCESCENS  Moderate  * No S aureus or Pseudomonas isolated.  SERRATIA MARCESCENS  Moderate  * No S aureus or Pseudomonas isolated.  SERRATIA MARCESCENS  Few  *     Urine Culture:   Recent Labs   Lab 08/26/20  0915   LABURIN No significant growth     Urine Studies:   Recent Labs   Lab 08/09/20  0555  10/04/20  0208 10/06/20  0626   COLORU Yellow   < > Vane Straw   APPEARANCEUA Hazy*   < > Cloudy* Clear   PHUR 6.0   < > 6.0 7.0   SPECGRAV >=1.030*   < > 1.015 1.000*   PROTEINUA 1+*   < > 1+* Negative   GLUCUA Negative   < > Negative Negative   KETONESU Negative   < > Negative Negative   BILIRUBINUA Negative   < > Negative Negative   OCCULTUA 3+*   < > Negative Negative   NITRITE Negative   < > Negative Negative   UROBILINOGEN Negative  --   --   --    LEUKOCYTESUR Negative   < > 2+* Negative   RBCUA 40*   < > 0  --    WBCUA 8*   < > 7*  --    BACTERIA Occasional   < > Few*  --     SQUAMEPITHEL  --    < > 2  --    HYALINECASTS 2*   < > 10*  --     < > = values in this interval not displayed.     Wound Culture: No results for input(s): LABAERO in the last 4320 hours.    Significant Imaging: I have reviewed all pertinent imaging results/findings within the past 24 hours.

## 2020-10-06 NOTE — PROGRESS NOTES
Pharmacokinetic Assessment Follow Up: IV Vancomycin    Vancomycin serum concentration assessment(s):    -Vancomycin trough was drawn appropriately, ~7 hours from the previous dose, and can be used to guide therapy.  -A level of 10.9 mcg/mL is below therapeutic range of 15-20 mcg/mL for PNA/sacral wound.   -Renal function remains stable, adequate urine output.     Vancomycin Regimen Plan:    -Empirically increase regimen to vancomycin 750 mg IV Q8H.   -Est AUC:KEYSHAWN 583 mcg*hr/mL.    -Obtain a level prior to the fourth dose on 10/7 @ 1815.    Drug levels (last 3 results):  Recent Labs   Lab Result Units 10/04/20  0857 10/05/20  0316 10/06/20  1007   Vancomycin, Random ug/mL  --  11.5  --    Vancomycin-Trough ug/mL 30.1*  --  10.9       Pharmacy will continue to follow and monitor vancomycin.    Please contact pharmacy at extension [phone] for questions regarding this assessment.    Thank you for the consult,   Amilcar Gray       Patient brief summary:  Glen Moscoso is a 24 y.o. male initiated on antimicrobial therapy with IV Vancomycin for treatment of lower respiratory infection      Drug Allergies:   Review of patient's allergies indicates:  No Known Allergies    Actual Body Weight:   34 kg    Renal Function:   Estimated Creatinine Clearance: 111.4 mL/min (based on SCr of 0.5 mg/dL).,     Dialysis Method (if applicable):  N/A    CBC (last 72 hours):  Recent Labs   Lab Result Units 10/03/20  1350 10/04/20  0208 10/05/20  0316 10/06/20  0259   WBC K/uL 14.23* 16.11* 10.27 10.13   Hemoglobin g/dL 9.1* 8.8* 8.4* 8.2*   Hematocrit % 30.0* 28.8* 27.8* 26.9*   Platelets K/uL 702* 673* 618* 634*   Gran% % 80.4* 79.4* 76.0* 74.8*   Lymph% % 9.6* 10.8* 13.0* 14.0*   Mono% % 9.3 9.1 10.3 10.7   Eosinophil% % 0.0 0.0 0.1 0.0   Basophil% % 0.3 0.3 0.3 0.2   Differential Method  Automated Automated Automated Automated       Metabolic Panel (last 72 hours):  Recent Labs   Lab Result Units 10/04/20  0208 10/05/20  0312  10/06/20  0259 10/06/20  0626   Sodium mmol/L 139 135* 137  --    Potassium mmol/L 3.4* 3.7 3.6  --    Chloride mmol/L 102 103 102  --    CO2 mmol/L 26 24 26  --    Glucose mg/dL 99 116* 118*  --    Glucose, UA  Negative  --   --  Negative   BUN, Bld mg/dL 8 7 7  --    Creatinine mg/dL 0.4* 0.4* 0.5  --    Albumin g/dL 2.2* 2.0* 2.0*  --    Total Bilirubin mg/dL 0.2 0.1 0.1  --    Alkaline Phosphatase U/L 89 79 80  --    AST U/L 12 15 12  --    ALT U/L 8* 8* 9*  --    Magnesium mg/dL 1.8 1.7 1.7  --    Phosphorus mg/dL 3.5 3.3 2.3*  --        Vancomycin Administrations:  vancomycin given in the last 96 hours                   vancomycin (VANCOCIN) 500 mg in dextrose 5 % 100 mL IVPB (mg) 500 mg New Bag 10/06/20 0250     500 mg New Bag 10/05/20 1844     500 mg New Bag  1347    vancomycin 1.25 g in dextrose 5% 250 mL IVPB (ready to mix) (mg) 1,250 mg New Bag 10/04/20 0919     1,250 mg New Bag 10/03/20 2118     1,250 mg New Bag  0906     1,250 mg New Bag 10/02/20 2157                Microbiologic Results:  Microbiology Results (last 7 days)     Procedure Component Value Units Date/Time    Culture, Respiratory with Gram Stain [[id number]]  (Abnormal)  (Susceptibility) Collected: 10/03/20 1749    Order Status: Completed Specimen: Respiratory from Endotracheal Aspirate Updated: 10/06/20 0947     Respiratory Culture No S aureus or Pseudomonas isolated.      SERRATIA MARCESCENS  Few       Gram Stain (Respiratory) <10 epithelial cells per low power field.     Gram Stain (Respiratory) Rare WBC's     Gram Stain (Respiratory) Rare budding yeast    Narrative:      Mini-BAL. Before antibiotics.    Blood culture [[id number]] Collected: 10/04/20 0133    Order Status: Completed Specimen: Blood from Peripheral, Lower Arm, Left Updated: 10/06/20 0613     Blood Culture, Routine No Growth to date      No Growth to date      No Growth to date    Narrative:      Blood cultures x 2 different sites. 4 bottles total. Please  draw cultures  before administering antibiotics.    Blood culture [[id number]] Collected: 10/03/20 1515    Order Status: Completed Specimen: Blood from Peripheral, Hand, Right Updated: 10/05/20 1612     Blood Culture, Routine No Growth to date      No Growth to date      No Growth to date    Narrative:      Blood cultures from 2 different sites. 4 bottles total.  Please draw before starting antibiotics.

## 2020-10-06 NOTE — ASSESSMENT & PLAN NOTE
23 yo male with CP with recent admit for respiratory failure and pseudomonas/serratia PNA, ischial ulcers, chronic diarrhea s/p flex sig on last admit without significant findings who completed 14 days of zosyn for PNA and soft tissue wounds who was readmitted 9/20 with fever.  Pt broadened to Vanc cefepime and flagyl.       CT imaging at that time showed new small volume pneumoperitoneum.  Source was suspected from intestinal perforation possibly secondary to prior flex sig.  Pt seen by General Surgery.  No intervention recommended.         Wounds on this admit noted to have exposed bone but did not appear infected. WBC and fever normalized.  ID saw the pt intially and recommended to continue broad therapy of Vanc, cefepime and flagyl to cover for prior PNA cx, wound osteo, and GI perf for 4 weeks (est end date 10/22). Tunneled catheter  placed 9/28.     Developed fever again on 9/29, persisting through 10/3.  A repeat CT C/A/P on 9/30 showed interval resolution of pneumoperitoneum.  No evidence of bowel obstruction.  Mild circumferential wall thickening of the ascending colon, similar to most recent prior exam.  No significant pericolonic fat stranding.   Also showed Ill-defined ground-glass opacity in the right lower lobe which could represent atelectatic changes.   No focal consolidation, pleural effusion or pneumothorax.         ID reconsulted 10/3 for persistent fever.   A CXR of 10/3 concerning for some perihilar opacities ? Aspiration, but repeat of 10/4 shows lungs are clear and without focal consolidation  Previously noted perihilar opacities likely represented artifact.    Wound Care last assessed 9/30 and wounds noted to be improving.  White count WNL.  Procal wnl.  CRP 56.8 (downtrendind- 9/.5).       Remains afebrile X 72 hours.  Repeat blood cultures NGTD.  Repeat respiratory culture  showing recurrent Serratia, that remains Cefepime S.  Has been present since late August.  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  BLE US negative for DVT. Remains on room air, O2 sats 98%.  Mild tachycardia. No distress.      Plan:  - Continue current vancomycin (Pharmacy to dose; trough goal 15-20), cefepime 2 grams IV q 8 hours, and flagyl 500 mg per G tube q 8 hours.   Estimated end date 10/22/20.    - Weekly cbc, cmp, crp, esr and fax results to ID, attention Wayne Bazan NP, at fax [phone]. (Pager [phone], spectra [phone], office [phone])  - Will need continue aggressive wound care, nutritional support, and IV abx. Recommend LTAC  - ID follow up at end of care.  If patient goes to [facility], please consult ID there for follow up.   - Will sign off.  Please call or re-consult as needed.     Data reviewed and plan discussed with ID staff  Discussed with Primary Team

## 2020-10-06 NOTE — PROGRESS NOTES
[facility]  Infectious Disease  Progress Note    Patient Name: Glen Moscoso  MRN: [id number]  Admission Date: 9/20/2020  Length of Stay: 15 days  Attending Physician: Quiana Armenta MD  Primary Care Provider: Yadi Lawson MD    Isolation Status: No active isolations  Assessment/Plan:      Fever     23 yo male with CP with recent admit for respiratory failure and pseudomonas/serratia PNA, ischial ulcers, chronic diarrhea s/p flex sig on last admit without significant findings who completed 14 days of zosyn for PNA and soft tissue wounds who was readmitted 9/20 with fever.  Pt broadened to Vanc cefepime and flagyl.       CT imaging at that time showed new small volume pneumoperitoneum.  Source was suspected from intestinal perforation possibly secondary to prior flex sig.  Pt seen by General Surgery.  No intervention recommended.         Wounds on this admit noted to have exposed bone but did not appear infected. WBC and fever normalized.  ID saw the pt intially and recommended to continue broad therapy of Vanc, cefepime and flagyl to cover for prior PNA cx, wound osteo, and GI perf for 4 weeks (est end date 10/22). Tunneled catheter  placed 9/28.     Developed fever again on 9/29, persisting through 10/3.  A repeat CT C/A/P on 9/30 showed interval resolution of pneumoperitoneum.  No evidence of bowel obstruction.  Mild circumferential wall thickening of the ascending colon, similar to most recent prior exam.  No significant pericolonic fat stranding.   Also showed Ill-defined ground-glass opacity in the right lower lobe which could represent atelectatic changes.   No focal consolidation, pleural effusion or pneumothorax.         ID reconsulted 10/3 for persistent fever.   A CXR of 10/3 concerning for some perihilar opacities ? Aspiration, but repeat of 10/4 shows lungs are clear and without focal consolidation  Previously noted perihilar opacities likely represented artifact.    White count  WNL.  Procal wnl.  CRP 56.8 (downtrendind- 9/.5).       Remains afebrile X 48 hours.  Repeat blood cultures NGTD.  Repeat respiratory culture again showing Serratia.  BLE US negative for DVT. On room air, O2 sats 98%.  Mild tachycardia. No distress.      Plan:  - Continue current vancomycin (Pharmacy to dose), cefepime, and flagyl.   - Anticipate continuing current abx through original end date of 10/22, but will follow up pending Serratia susceptibilities in respiratory culture to ensure no changes necessary.   - Will follow up tomorrow.     Data reviewed and plan discussed with ID staff    Thank you.   Please call for any questions or concerns.  NANI Romo, ANP-C  [phone] pager, [facility] [phone]  Subjective:     Principal Problem:Acute hypoxemic respiratory failure    HPI: Mr. Glen Moscoso is a 24 y.o. male with CP, complicated by seizures and a peg tube, who presents to the ER for evaluation of fever.  He just had a prolonged and complex hospital stay at [facility] from 8/26-9/18 for seizures and pneumonia.  He was originally admitted to [facility] with respiratory failure, where he was found to have Pseudomonas and Serratia pneumonia, as well as a decubitus ulcer that was debrided by Gen Surg on 9/7 - cultures were not sent.  He had repeat resp cultures that showed Serratia.  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  Skeletal structures otherwise intact without evidence of erosive change. He was followed by ID.  He was discharged home 9/18 on Zosyn via E PICC until 9/24 (to complete 2 weeks).      Family at bedside on admission reported he was doing well until the day of admission when he started to have fevers up to 101.2F.  They denied any new respiratory complaints.  Given his recent hospitalization and fever while on antibiotics, they brought him to the  ER for further evaluation.     Upon arrival to the ER, vitals were temp 101.2F, , RR 24 and /75.  Labs showed WBC 17 with ESR 90 and Procal 0.56.  CXR didn't show a new consolidation.  His case was discussed with ID on call, who suggested Vanc, Cefepime, and Flagyl based on his previous cultures, as well as CT chest/abdomen/pelvis to include sacral wounds.  He was admitted to Hospital Medicine for further management and ID now consulted for fever despite being on ABX and no clears source.    Interval History:  Patient has undergone a CT CAP and colitis, a small amount of pneumoperitoneum found likely from perforation, chronic changes of the pelvic bones, and mild retained secretions in the left mainstem bronchus with artial opacification of the left lower lobe bronchus, but no significant airspace disease in the left lower lobe.  .  Primary team has G tube check and no extravasation seen and case discussed with Gen Tamez who felt patient jhonathan had small perforation and would monitor clinically.  Febrile to 101.2 on admit and now afebrile.  WBC 16 on admit now 11. Blood cultures NGTD.      Interval History: No acute events overnight.  On room air, O2 sats 98%.  Tachycardic, but HDS.   Mother at bedside.    Respiratory cx again showing Serratia - suscep pending.    Review of Systems   Unable to perform ROS: Patient nonverbal     Objective:     Vital Signs (Most Recent):  Temp: 99.4 °F (37.4 °C) (10/05/20 1934)  Pulse: (!) 123 (10/05/20 1934)  Resp: (!) 22 (10/05/20 1934)  BP: 122/79 (10/05/20 1934)  SpO2: 97 % (10/05/20 1934) Vital Signs (24h Range):  Temp:  [97.8 °F (36.6 °C)-99.4 °F (37.4 °C)] 99.4 °F (37.4 °C)  Pulse:  [] 123  Resp:  [16-31] 22  SpO2:  [96 %-100 %] 97 %  BP: (107-136)/(62-89) 122/79     Weight: 33.4 kg (73 lb 10.1 oz)  Body mass index is 15.93 kg/m².    Estimated Creatinine Clearance: 136.8 mL/min (A) (based on SCr of 0.4 mg/dL (L)).    Physical Exam  Vitals signs and nursing note  reviewed.   Constitutional:       General: He is not in acute distress.     Appearance: He is well-developed. He is cachectic. He is ill-appearing (Chronic).   Eyes:      Conjunctiva/sclera: Conjunctivae normal.   Neck:      Vascular: No JVD.   Cardiovascular:      Rate and Rhythm: Normal rate and regular rhythm.      Heart sounds: Normal heart sounds. No murmur. No friction rub. No gallop.    Pulmonary:      Effort: Pulmonary effort is normal. No respiratory distress.      Breath sounds: No wheezing.      Comments: On room air  Abdominal:      General: Abdomen is flat. Bowel sounds are normal. There is no distension.      Palpations: Abdomen is soft.      Tenderness: There is no abdominal tenderness. There is no guarding.      Comments: PEG tube in place - c/d/i   Skin:     Capillary Refill: Capillary refill takes less than 2 seconds.      Findings: No erythema or rash.      Comments: Tunneled cathter RU chest - c/d/i   Psychiatric:         Speech: He is noncommunicative.         Behavior: Behavior is cooperative.         Significant Labs:   Blood Culture:   Recent Labs   Lab 09/20/20  1853 09/20/20  1854 09/24/20  0533 10/03/20  1515 10/04/20  0133   LABBLOO No growth after 5 days. No growth after 5 days. No growth after 5 days.  No growth after 5 days. No Growth to date  No Growth to date  No Growth to date No Growth to date  No Growth to date     CBC:   Recent Labs   Lab 10/04/20  0208 10/05/20  0316   WBC 16.11* 10.27   HGB 8.8* 8.4*   HCT 28.8* 27.8*   * 618*     CMP:   Recent Labs   Lab 10/04/20  0208 10/05/20  0316    135*   K 3.4* 3.7    103   CO2 26 24   GLU 99 116*   BUN 8 7   CREATININE 0.4* 0.4*   CALCIUM 8.8 8.5*   PROT 6.9 6.5   ALBUMIN 2.2* 2.0*   BILITOT 0.2 0.1   ALKPHOS 89 79   AST 12 15   ALT 8* 8*   ANIONGAP 11 8   EGFRNONAA >60.0 >60.0     Respiratory Culture:   Recent Labs   Lab 08/26/20  0505 08/31/20  2157 09/06/20  1103 10/03/20  1749   GSRESP <10 epithelial cells per  low power field.  Moderate WBC's  Many Gram negative rods  Many Gram negative diplococci <10 epithelial cells per low power field.  Rare WBC's  Rare Gram negative rods >10 epithelial cells per low power field  Rare WBC's  Rare Gram negative rods <10 epithelial cells per low power field.  Rare WBC's  Rare budding yeast   RESPIRATORYC No S aureus isolated.  SERRATIA MARCESCENS  Moderate  Normal respiratory jose also present  *  PSEUDOMONAS AERUGINOSA  Moderate  * No S aureus or Pseudomonas isolated.  SERRATIA MARCESCENS  Moderate  * No S aureus or Pseudomonas isolated.  SERRATIA MARCESCENS  Moderate  * SERRATIA MARCESCENS  Few  Susceptibility pending  *     Urine Culture:   Recent Labs   Lab 08/26/20  0915   LABURIN No significant growth     Urine Studies:   Recent Labs   Lab 08/09/20  0555  10/04/20  0208   COLORU Yellow   < > Vane   APPEARANCEUA Hazy*   < > Cloudy*   PHUR 6.0   < > 6.0   SPECGRAV >=1.030*   < > 1.015   PROTEINUA 1+*   < > 1+*   GLUCUA Negative   < > Negative   KETONESU Negative   < > Negative   BILIRUBINUA Negative   < > Negative   OCCULTUA 3+*   < > Negative   NITRITE Negative   < > Negative   UROBILINOGEN Negative  --   --    LEUKOCYTESUR Negative   < > 2+*   RBCUA 40*   < > 0   WBCUA 8*   < > 7*   BACTERIA Occasional   < > Few*   SQUAMEPITHEL  --    < > 2   HYALINECASTS 2*   < > 10*    < > = values in this interval not displayed.     Wound Culture: No results for input(s): LABAERO in the last 4320 hours.    Significant Imaging: I have reviewed all pertinent imaging results/findings within the past 24 hours.

## 2020-10-06 NOTE — PLAN OF CARE
10/06/20 1441   Post-Acute Status   Post-Acute Authorization Placement   Post-Acute Placement Status Referrals Sent   Pt declined for the second time by NIGH[facility] Martinez. [facility] continues to follow patient, but does have a long wait list for admits. Declined by [facility] and [facility].    Zhanna Paulino LMSW  [facility]  [phone]

## 2020-10-06 NOTE — PROGRESS NOTES
Progress Note   Hospital Medicine         Patient Name: Glen Moscoso  MRN:  [id number]  Hospital Medicine Team: [facility] Quiana Armenta MD  Date of Admission:  9/20/2020     Length of Stay:  LOS: 16 days   Expected Discharge Date: 10/8/2020  Principal Problem:  Acute hypoxemic respiratory failure       Subjective:     Interval History/Overnight Events:    Had some urinary retention yesterday, U/S showed collapsible bladder not distention as mild resistance with straight cath. Mom reports once at home required straight cath with simliar finding reported by HH. Output fine today, still tach so doesn't appera was source of mild tach for a few days. At home is more 90s per mom. Only thing we really havet checked for is  PE. Is intermed prob and D dimer mildy up on check. discused with mom, runs 90s usually at home on home BB. Decision to proceed with CTA to rule outanyo there cuase of his persistent tach since late last week given the sepsis issue seems to be calmed, and this was present before aspiration issue, and hes been on RA now, and cxr improving, LE neg but would liek to rule out any other hidden cause for sure of the tach that hasnt been done for absolute completeness now. She is on board.  Everything else has been fairly stable,serratia still in sputum, ID aware, likely are keeping same end date antibiotics bt will trend. Working on LTAC< mom is hopeful they can find one [place] if possible.            Review of Systems   Unable to assess secondary to condition.    Objective:     Temp:  [98.1 °F (36.7 °C)-99.6 °F (37.6 °C)]   Pulse:  []   Resp:  [16-22]   BP: (116-136)/(68-95)   SpO2:  [96 %-100 %]       Physical Exam:  Constitutional: near baselin from previous, comfortable. Mild increased RR but on rA, stable. Closer to previous days.  Head: Normocephalic and atraumatic.   Mouth/Throat: Oropharynx is clear and moist.   Eyes: EOM are normal. Pupils are equal, round, and  reactive to light. No scleral icterus.   Neck: Normal range of motion. Neck supple.   Cardiovascular: tachy at 110.  No murmur heard.  Pulmonary/Chest: mild increased  RR, no resp distress, calm breathing, lung sounds equal Right chest all permacath, covered in tegrederm.   Abdominal: Soft. No grimacing Bowel sounds are normal.  No distension or tenderness. PEG in place. Chronic redness mild.   Musculoskeletal: contractures in arms, legs.   Neurological: non communicative, baseline.  Skin: Skin is warm and dry.   Psychiatric: at baseline, noncommunicative.back to baseline, not agitated.    Recent Labs   Lab 10/02/20  0405 10/03/20  0517 10/03/20  1350 10/04/20  0208 10/05/20  0316 10/06/20  0259   WBC 10.13 11.99 14.23* 16.11* 10.27 10.13   HGB 9.3* 9.5* 9.1* 8.8* 8.4* 8.2*   HCT 30.3* 31.1* 30.0* 28.8* 27.8* 26.9*   * 735* 702* 673* 618* 634*     Recent Labs   Lab 10/04/20  0208 10/05/20  0316 10/06/20  0259    135* 137   K 3.4* 3.7 3.6    103 102   CO2 26 24 26   BUN 8 7 7   CREATININE 0.4* 0.4* 0.5   GLU 99 116* 118*   CALCIUM 8.8 8.5* 8.3*   MG 1.8 1.7 1.7   PHOS 3.5 3.3 2.3*     Recent Labs   Lab 10/04/20  0208 10/05/20  0316 10/06/20  0259   ALKPHOS 89 79 80   ALT 8* 8* 9*   AST 12 15 12   ALBUMIN 2.2* 2.0* 2.0*   PROT 6.9 6.5 6.2   BILITOT 0.2 0.1 0.1     Recent Labs   Lab 09/30/20  1104   POCTGLUCOSE 160*        albuterol-ipratropium  3 mL Nebulization Q4H    ascorbic acid (vitamin C)  500 mg Per G Tube Daily    baclofen  10 mg Per G Tube TID    ceFEPime (MAXIPIME) IVPB  1 g Intravenous Q8H    gabapentin  250 mg Per G Tube QHS    heparin (porcine)  5,000 Units Subcutaneous Q12H    Lactobacillus rhamnosus GG  1 capsule Per G Tube Daily    loperamide  2 mg Per G Tube QID    metroNIDAZOLE  500 mg Per G Tube TID    multivitamin liquid no.118  10 mL Per G Tube Daily    mupirocin   Nasal BID    PHENobarbitaL  100 mg Per G Tube QHS    propranoloL  20 mg Per G Tube TID    vancomycin  (VANCOCIN) IVPB  500 mg Intravenous Q8H       Assessment and Plan     Mr. Glen Moscoso is a 24 y.o. male who presented to [facility] on 9/20/2020 with     Sepsis  Pneumoperitoneum  Colitis  -fever again after 48 hours fever free overnight to 100.7 x 1. WBC stable at 10 still, CRP 58, procal neg. Recently imaged without signs of infection. HR low 100s. Will trend, given had fevers last admit and dc with very quick readmit will cautiously watch over weekend even if LTAC ready, low threshold ID consult again if persists.   · Patient was afebrile with stable WBC with resolution of sepsis until patient had temp of 100.9 F and slight increase in WBC to 12 o 9/30--> iproved to 10 now, afebrile overnight, CT chset/abd with no signs of pneumonia, no signs of any worsening infections, pneumoperitoneum resolved. Continue broad spectrum per ID recs, has permacath for antibitoics given difficult IV access from contractures  · LTAC and CM/SW working on placement for patient for continued medical care.   · General surgery consulted on admit for pneumoperitoneum seen on admit CT scan of abdomen and pelvis. General surgery felt likely contained leak and very small bowel perforation related to recent colitis. Due to patient's very poor functional level at baseline and malnutrition felt to be very poor surgical candidate and recommended conservative.  · ID consulted and recommended IV Vancomycin, Cefepime and Flagyl to treat both small perforation and osteomyelitis of pelvic area and to extend abx until 10/22. IR to place tunneled catheter on 9/28 for long term antibiotics as current midline cannot be extended that long in future and bedside PICC team unable to place bedside PICC due to severe arm contractures.   · ID recommends repeat CT scan of abdomen to re-evaluate perforation in 2-3 weeks or sooner if any acute worsening (done on 9/30 and improved)    ACute hypoxemic respiratory failure  Aspiration pneumonia  Pneumonitis  -rapid  "response and episode on 10/3 x 2  -CXR infiltrates, positioning vs pneumonitis on repeat 10/4. To ICU, nebs, suctioning after NRB and copious secretions  -improved rapidly tehre from 10L to 2L, stepped down on 10/4 after watching overnigt  -on RA now. Continue very close oral and resp care now, no adjustments to antibiotics  -wbc 16-->10. resp Cx with rare yeast, serratia, likely will not extend treatment as probably colonixer at this point per ID, will f/u recs.  -blood Cx ng             Decubitus ulcer of ischial area, left, stage IV  Pressure injury of right ischium, stage 4  Osteomyelitis of pelvis  · Controlled. Wounds are improving. Continue current wound care (following in house known well to their team)  · Wounds appear clean with no definite signs of infection.   · As per wound care "Nursing to cleanse scrotal skin breakdown and R and L ischial pressure injuries with wound cleanser. Pack ischial pressure injuries with Triad ointment and gauze, then cover bordered foam dressing to prove autolytic debridement and prevent breakdown of intact skin. Apply Triad ointment over scrotal skin breakdown."  · Patient now with exposed bone to ischial areas and ID with concern for underlying osteomyelitis even though wounds themselves do not appear infected so recommend extension of long term abx with IV Vancomycin, Cefepime and po Flagyl until at least 10/22. IR placed tunneled line on 9/28 for long term antibiotics to right chest wall.      Severe protein-calorie malnutrition  · Present on admit and related to colitis, recent medical illnesses. Patient with PEG and receiving TF with Prebio to help with nutrition. Likely cause of poor wound healing and development of ischial ulcers.   · Nutrition consulted and following and continue Peptamen with Prebio at 40 mL/hr as per nutrition recs to treat malnutrition. at goal now on feeds  10/1.        Seizure disorder  Chronic and controlled and continue Phenobarbital to treat. "   -concern may be having subclinical seizures during rapid on 10/3, EEG was negative, push events for eye motions x 3 were also not seizures on eeg.        Anemia of chronic disease  Chronic and controlled and related to chronic infection, poor nutrition and poor baseline function status. Monitor anemia with daily CBC and transfuse if Hgb < 7.   -stable at mid 9's now. Required 1 tx on last admit and stable since        Hypophosphatemia  · Replace PRN     Cerebral palsy  Chronic condition. Patient with poor baseline functional status and non-communicative and bed bound/wheelchair bound with extremity contractures to all extremities at baseline.   Requires 24/7 care at home by mom who is very involved in his care at baseline    Tachycardia  -chronic due to CP and reflex tachy issues, on home BB  -given sedentary more than usual in hospital now and fevers, will check D dimer  (1..0) and repeat echo for any signs for PE. Had UE LE 2 weeks ago negative for fever. Has had superficial clots in UE in last month from lines  -if fever and tachy persist, will check CTA this weekend. Will resume at least px lovenox now.  -wells score of 3, moderate risk.  10/3: possible aspiration causing this the last 2 days, unclear, is still tachy but had events today for this also  10/5: still occurring at times  overnight. LE U/S neg for DVT in ICU. May be subclinical aspiration still going on. Still have low threshold to consider CTA but considering recent episode of aspiration, would hold off for now at least as still other reasons given recent aspiration as cause as do not want to risk event in scanner, not convincing enough for FD lovenox yet either. Trend for now, discussed 10/6 and will proceed with CTA ot rule out any other cause we have not checked for the low grade tachy with his mom. Ordered.      Diet:  tF resumed in ICU  DVT PPx:  Teds/scds. lovenox.       Disposition: sending ltac referrals again as we were still  working on accepting facility but need to watch a few days given events of Saturday to ensure resp status is stable but send referrals as was difficult placing last week when stable  Will get cta to rule out PE as anyo the runderlying cause of persistant mild tachy now for completeness      Discharge Planning   ROCÍO: 10/8/2020     Code Status: Full Code   Is the patient medically ready for discharge?: Yes    Reason for patient still in hospital (select all that apply): Patient trending condition  Discharge Plan A: Long-term acute care facility (LTAC)   Discharge Delays: None known at this time

## 2020-10-06 NOTE — PROGRESS NOTES
POC reviewed with pt, disoriented x4; unable to follow commands. No s/s of respiratory or cardiac distress. Sinus tach (120-130) on telemetry. MD notified for HR sustaining >120; administered 1L LR bolus. HR . VS stable on RA. Urine incontinence; straight cath for bladder scan >400 mL. Continuous tube feed running at 40 mL to G-tube. No indications of nausea/pain.    Dressing change on R tunneled central line. Dressing change due to be changed 10/13.    Wound care performed per order to left and right stage 4 wounds to ischium.    Pt free from falls and injuries, bed in low position, side rails up x2, call light within reach.    Will continue to monitor.

## 2020-10-06 NOTE — SUBJECTIVE & OBJECTIVE
Interval History: No acute events overnight.  On room air, O2 sats 98%.  Tachycardic, but HDS.   Mother at bedside.    Respiratory cx again showing Serratia - suscep pending.    Review of Systems   Unable to perform ROS: Patient nonverbal     Objective:     Vital Signs (Most Recent):  Temp: 99.4 °F (37.4 °C) (10/05/20 1934)  Pulse: (!) 123 (10/05/20 1934)  Resp: (!) 22 (10/05/20 1934)  BP: 122/79 (10/05/20 1934)  SpO2: 97 % (10/05/20 1934) Vital Signs (24h Range):  Temp:  [97.8 °F (36.6 °C)-99.4 °F (37.4 °C)] 99.4 °F (37.4 °C)  Pulse:  [] 123  Resp:  [16-31] 22  SpO2:  [96 %-100 %] 97 %  BP: (107-136)/(62-89) 122/79     Weight: 33.4 kg (73 lb 10.1 oz)  Body mass index is 15.93 kg/m².    Estimated Creatinine Clearance: 136.8 mL/min (A) (based on SCr of 0.4 mg/dL (L)).    Physical Exam  Vitals signs and nursing note reviewed.   Constitutional:       General: He is not in acute distress.     Appearance: He is well-developed. He is cachectic. He is ill-appearing (Chronic).   Eyes:      Conjunctiva/sclera: Conjunctivae normal.   Neck:      Vascular: No JVD.   Cardiovascular:      Rate and Rhythm: Normal rate and regular rhythm.      Heart sounds: Normal heart sounds. No murmur. No friction rub. No gallop.    Pulmonary:      Effort: Pulmonary effort is normal. No respiratory distress.      Breath sounds: No wheezing.      Comments: On room air  Abdominal:      General: Abdomen is flat. Bowel sounds are normal. There is no distension.      Palpations: Abdomen is soft.      Tenderness: There is no abdominal tenderness. There is no guarding.      Comments: PEG tube in place - c/d/i   Skin:     Capillary Refill: Capillary refill takes less than 2 seconds.      Findings: No erythema or rash.      Comments: Tunneled cathter RU chest - c/d/i   Psychiatric:         Speech: He is noncommunicative.         Behavior: Behavior is cooperative.         Significant Labs:   Blood Culture:   Recent Labs   Lab 09/20/20  0438  09/20/20  1854 09/24/20  0533 10/03/20  1515 10/04/20  0133   LABBLOO No growth after 5 days. No growth after 5 days. No growth after 5 days.  No growth after 5 days. No Growth to date  No Growth to date  No Growth to date No Growth to date  No Growth to date     CBC:   Recent Labs   Lab 10/04/20  0208 10/05/20  0316   WBC 16.11* 10.27   HGB 8.8* 8.4*   HCT 28.8* 27.8*   * 618*     CMP:   Recent Labs   Lab 10/04/20  0208 10/05/20  0316    135*   K 3.4* 3.7    103   CO2 26 24   GLU 99 116*   BUN 8 7   CREATININE 0.4* 0.4*   CALCIUM 8.8 8.5*   PROT 6.9 6.5   ALBUMIN 2.2* 2.0*   BILITOT 0.2 0.1   ALKPHOS 89 79   AST 12 15   ALT 8* 8*   ANIONGAP 11 8   EGFRNONAA >60.0 >60.0     Respiratory Culture:   Recent Labs   Lab 08/26/20  0505 08/31/20  2157 09/06/20  1103 10/03/20  1749   GSRESP <10 epithelial cells per low power field.  Moderate WBC's  Many Gram negative rods  Many Gram negative diplococci <10 epithelial cells per low power field.  Rare WBC's  Rare Gram negative rods >10 epithelial cells per low power field  Rare WBC's  Rare Gram negative rods <10 epithelial cells per low power field.  Rare WBC's  Rare budding yeast   RESPIRATORYC No S aureus isolated.  SERRATIA MARCESCENS  Moderate  Normal respiratory jose also present  *  PSEUDOMONAS AERUGINOSA  Moderate  * No S aureus or Pseudomonas isolated.  SERRATIA MARCESCENS  Moderate  * No S aureus or Pseudomonas isolated.  SERRATIA MARCESCENS  Moderate  * SERRATIA MARCESCENS  Few  Susceptibility pending  *     Urine Culture:   Recent Labs   Lab 08/26/20  0915   LABURIN No significant growth     Urine Studies:   Recent Labs   Lab 08/09/20  0555  10/04/20  0208   COLORU Yellow   < > Vane   APPEARANCEUA Hazy*   < > Cloudy*   PHUR 6.0   < > 6.0   SPECGRAV >=1.030*   < > 1.015   PROTEINUA 1+*   < > 1+*   GLUCUA Negative   < > Negative   KETONESU Negative   < > Negative   BILIRUBINUA Negative   < > Negative   OCCULTUA 3+*   < > Negative    NITRITE Negative   < > Negative   UROBILINOGEN Negative  --   --    LEUKOCYTESUR Negative   < > 2+*   RBCUA 40*   < > 0   WBCUA 8*   < > 7*   BACTERIA Occasional   < > Few*   SQUAMEPITHEL  --    < > 2   HYALINECASTS 2*   < > 10*    < > = values in this interval not displayed.     Wound Culture: No results for input(s): LABAERO in the last 4320 hours.    Significant Imaging: I have reviewed all pertinent imaging results/findings within the past 24 hours.

## 2020-10-07 LAB
ALBUMIN SERPL BCP-MCNC: 1.8 G/DL (ref 3.5–5.2)
ALP SERPL-CCNC: 68 U/L (ref 55–135)
ALT SERPL W/O P-5'-P-CCNC: 8 U/L (ref 10–44)
ANION GAP SERPL CALC-SCNC: 8 MMOL/L (ref 8–16)
AST SERPL-CCNC: 17 U/L (ref 10–40)
BASOPHILS # BLD AUTO: 0.02 K/UL (ref 0–0.2)
BASOPHILS NFR BLD: 0.2 % (ref 0–1.9)
BILIRUB SERPL-MCNC: <0.1 MG/DL (ref 0.1–1)
BUN SERPL-MCNC: 6 MG/DL (ref 6–20)
CALCIUM SERPL-MCNC: 8.1 MG/DL (ref 8.7–10.5)
CHLORIDE SERPL-SCNC: 102 MMOL/L (ref 95–110)
CO2 SERPL-SCNC: 27 MMOL/L (ref 23–29)
CREAT SERPL-MCNC: 0.4 MG/DL (ref 0.5–1.4)
DIFFERENTIAL METHOD: ABNORMAL
EOSINOPHIL # BLD AUTO: 0.1 K/UL (ref 0–0.5)
EOSINOPHIL NFR BLD: 0.7 % (ref 0–8)
ERYTHROCYTE [DISTWIDTH] IN BLOOD BY AUTOMATED COUNT: 13.7 % (ref 11.5–14.5)
EST. GFR  (AFRICAN AMERICAN): >60 ML/MIN/1.73 M^2
EST. GFR  (NON AFRICAN AMERICAN): >60 ML/MIN/1.73 M^2
GLUCOSE SERPL-MCNC: 159 MG/DL (ref 70–110)
HCT VFR BLD AUTO: 24.7 % (ref 40–54)
HGB BLD-MCNC: 7.7 G/DL (ref 14–18)
IMM GRANULOCYTES # BLD AUTO: 0.02 K/UL (ref 0–0.04)
IMM GRANULOCYTES NFR BLD AUTO: 0.2 % (ref 0–0.5)
LYMPHOCYTES # BLD AUTO: 1.3 K/UL (ref 1–4.8)
LYMPHOCYTES NFR BLD: 15.6 % (ref 18–48)
MAGNESIUM SERPL-MCNC: 1.7 MG/DL (ref 1.6–2.6)
MCH RBC QN AUTO: 30.2 PG (ref 27–31)
MCHC RBC AUTO-ENTMCNC: 31.2 G/DL (ref 32–36)
MCV RBC AUTO: 97 FL (ref 82–98)
MONOCYTES # BLD AUTO: 1.1 K/UL (ref 0.3–1)
MONOCYTES NFR BLD: 13.4 % (ref 4–15)
NEUTROPHILS # BLD AUTO: 5.7 K/UL (ref 1.8–7.7)
NEUTROPHILS NFR BLD: 69.9 % (ref 38–73)
NRBC BLD-RTO: 0 /100 WBC
PHOSPHATE SERPL-MCNC: 2.8 MG/DL (ref 2.7–4.5)
PLATELET # BLD AUTO: 584 K/UL (ref 150–350)
PMV BLD AUTO: 8.9 FL (ref 9.2–12.9)
POTASSIUM SERPL-SCNC: 3.6 MMOL/L (ref 3.5–5.1)
PROT SERPL-MCNC: 5.7 G/DL (ref 6–8.4)
RBC # BLD AUTO: 2.55 M/UL (ref 4.6–6.2)
SODIUM SERPL-SCNC: 137 MMOL/L (ref 136–145)
VANCOMYCIN TROUGH SERPL-MCNC: 21.7 UG/ML (ref 10–22)
WBC # BLD AUTO: 8.22 K/UL (ref 3.9–12.7)

## 2020-10-07 PROCEDURE — 80053 COMPREHEN METABOLIC PANEL: CPT

## 2020-10-07 PROCEDURE — 63600175 PHARM REV CODE 636 W HCPCS: Performed by: INTERNAL MEDICINE

## 2020-10-07 PROCEDURE — 83735 ASSAY OF MAGNESIUM: CPT

## 2020-10-07 PROCEDURE — 25000003 PHARM REV CODE 250: Performed by: PHYSICIAN ASSISTANT

## 2020-10-07 PROCEDURE — 25000003 PHARM REV CODE 250: Performed by: HOSPITALIST

## 2020-10-07 PROCEDURE — 63600175 PHARM REV CODE 636 W HCPCS: Performed by: HOSPITALIST

## 2020-10-07 PROCEDURE — 85025 COMPLETE CBC W/AUTO DIFF WBC: CPT

## 2020-10-07 PROCEDURE — 99232 SBSQ HOSP IP/OBS MODERATE 35: CPT | Mod: ,,, | Performed by: HOSPITALIST

## 2020-10-07 PROCEDURE — 80202 ASSAY OF VANCOMYCIN: CPT

## 2020-10-07 PROCEDURE — 94640 AIRWAY INHALATION TREATMENT: CPT

## 2020-10-07 PROCEDURE — 84100 ASSAY OF PHOSPHORUS: CPT

## 2020-10-07 PROCEDURE — 25000003 PHARM REV CODE 250: Performed by: INTERNAL MEDICINE

## 2020-10-07 PROCEDURE — 94668 MNPJ CHEST WALL SBSQ: CPT

## 2020-10-07 PROCEDURE — 25000242 PHARM REV CODE 250 ALT 637 W/ HCPCS: Performed by: NURSE PRACTITIONER

## 2020-10-07 PROCEDURE — 94761 N-INVAS EAR/PLS OXIMETRY MLT: CPT

## 2020-10-07 PROCEDURE — 20600001 HC STEP DOWN PRIVATE ROOM

## 2020-10-07 PROCEDURE — 63600175 PHARM REV CODE 636 W HCPCS: Performed by: NURSE PRACTITIONER

## 2020-10-07 PROCEDURE — 99232 PR SUBSEQUENT HOSPITAL CARE,LEVL II: ICD-10-PCS | Mod: ,,, | Performed by: HOSPITALIST

## 2020-10-07 PROCEDURE — 25500020 PHARM REV CODE 255: Performed by: HOSPITALIST

## 2020-10-07 RX ADMIN — IPRATROPIUM BROMIDE AND ALBUTEROL SULFATE 3 ML: .5; 2.5 SOLUTION RESPIRATORY (INHALATION) at 11:10

## 2020-10-07 RX ADMIN — Medication 10 ML: at 08:10

## 2020-10-07 RX ADMIN — VANCOMYCIN HYDROCHLORIDE 750 MG: 750 INJECTION, POWDER, LYOPHILIZED, FOR SOLUTION INTRAVENOUS at 11:10

## 2020-10-07 RX ADMIN — VANCOMYCIN HYDROCHLORIDE 750 MG: 750 INJECTION, POWDER, LYOPHILIZED, FOR SOLUTION INTRAVENOUS at 02:10

## 2020-10-07 RX ADMIN — IPRATROPIUM BROMIDE AND ALBUTEROL SULFATE 3 ML: .5; 2.5 SOLUTION RESPIRATORY (INHALATION) at 12:10

## 2020-10-07 RX ADMIN — IPRATROPIUM BROMIDE AND ALBUTEROL SULFATE 3 ML: .5; 2.5 SOLUTION RESPIRATORY (INHALATION) at 04:10

## 2020-10-07 RX ADMIN — METRONIDAZOLE 500 MG: 500 TABLET ORAL at 08:10

## 2020-10-07 RX ADMIN — LOPERAMIDE HYDROCHLORIDE 2 MG: 1 SOLUTION ORAL at 08:10

## 2020-10-07 RX ADMIN — PROPRANOLOL HYDROCHLORIDE 20 MG: 20 TABLET ORAL at 09:10

## 2020-10-07 RX ADMIN — HEPARIN SODIUM 5000 UNITS: 5000 INJECTION INTRAVENOUS; SUBCUTANEOUS at 08:10

## 2020-10-07 RX ADMIN — LOPERAMIDE HYDROCHLORIDE 2 MG: 1 SOLUTION ORAL at 03:10

## 2020-10-07 RX ADMIN — Medication 500 MG: at 08:10

## 2020-10-07 RX ADMIN — MELATONIN TAB 3 MG 6 MG: 3 TAB at 08:10

## 2020-10-07 RX ADMIN — BACLOFEN 10 MG: 10 TABLET ORAL at 08:10

## 2020-10-07 RX ADMIN — VANCOMYCIN HYDROCHLORIDE 750 MG: 750 INJECTION, POWDER, LYOPHILIZED, FOR SOLUTION INTRAVENOUS at 06:10

## 2020-10-07 RX ADMIN — MUPIROCIN: 20 OINTMENT TOPICAL at 08:10

## 2020-10-07 RX ADMIN — METRONIDAZOLE 500 MG: 500 TABLET ORAL at 03:10

## 2020-10-07 RX ADMIN — PROPRANOLOL HYDROCHLORIDE 20 MG: 20 TABLET ORAL at 08:10

## 2020-10-07 RX ADMIN — GABAPENTIN 250 MG: 250 SOLUTION ORAL at 08:10

## 2020-10-07 RX ADMIN — IPRATROPIUM BROMIDE AND ALBUTEROL SULFATE 3 ML: .5; 2.5 SOLUTION RESPIRATORY (INHALATION) at 07:10

## 2020-10-07 RX ADMIN — LOPERAMIDE HYDROCHLORIDE 2 MG: 1 SOLUTION ORAL at 05:10

## 2020-10-07 RX ADMIN — CEFEPIME 1 G: 1 INJECTION, POWDER, FOR SOLUTION INTRAMUSCULAR; INTRAVENOUS at 02:10

## 2020-10-07 RX ADMIN — IPRATROPIUM BROMIDE AND ALBUTEROL SULFATE 3 ML: .5; 2.5 SOLUTION RESPIRATORY (INHALATION) at 09:10

## 2020-10-07 RX ADMIN — BACLOFEN 10 MG: 10 TABLET ORAL at 03:10

## 2020-10-07 RX ADMIN — Medication 1 CAPSULE: at 08:10

## 2020-10-07 RX ADMIN — CEFEPIME 1 G: 1 INJECTION, POWDER, FOR SOLUTION INTRAMUSCULAR; INTRAVENOUS at 05:10

## 2020-10-07 RX ADMIN — PHENOBARBITAL 100 MG: 20 ELIXIR ORAL at 08:10

## 2020-10-07 RX ADMIN — CEFEPIME 1 G: 1 INJECTION, POWDER, FOR SOLUTION INTRAMUSCULAR; INTRAVENOUS at 11:10

## 2020-10-07 RX ADMIN — ACETAMINOPHEN 650 MG: 325 TABLET ORAL at 08:10

## 2020-10-07 RX ADMIN — PROPRANOLOL HYDROCHLORIDE 20 MG: 20 TABLET ORAL at 03:10

## 2020-10-07 RX ADMIN — IOHEXOL 75 ML: 350 INJECTION, SOLUTION INTRAVENOUS at 02:10

## 2020-10-07 NOTE — PROGRESS NOTES
POC reviewed with pt, disoriented x4; unable to follow commands. No s/s of respiratory or cardiac distress. Sinus tach (105-115) on telemetry. VS stable on RA. Urine incontinence. Continuous tube feed running at 40 mL to G-tube. No indications of nausea/pain.     R tunneled central line. Dressing change due to be changed 10/13.     Wound care performed per order to left and right wounds to ischium; scrotal wound car performed per order.    Pt turned q2h per order.    Pt free from falls and injuries, bed in low position, side rails up x2, call light within reach.     Will continue to monitor

## 2020-10-07 NOTE — PROGRESS NOTES
Progress Note   Hospital Medicine         Patient Name: Glen Moscoso  MRN:  [id number]  Hospital Medicine Team: [facility] [place] [facility] Quiana Armenta MD  Date of Admission:  9/20/2020     Length of Stay:  LOS: 16 days   Expected Discharge Date: 10/8/2020  Principal Problem:  Acute hypoxemic respiratory failure       Subjective:     Interval History/Overnight Events:    Seen in room with mom at bedside later in day, reports feeling wel,, tachy seems lightly improving. Has had intermittent urine retention it apperas so have to watch as may have had this causing some issues as well, has slight HG downtrend so watch that closely also as some resistance in rizvi, incontinence but no dark stoools reported. CTA negative for PE, some opacities with the edema from the aspiration known from the weekend, on RA, no further issues since then and on SD was on RA, continue close resp care in interim. Difficulty for ltac so referrals still out. Mom wants to get a PCP here for all of his chronic needs for likely November then to establish this, hector mcelroy to see if CM can do this and then chronic referrals to neuro, ortho  As well as a closer ID follow up as well  She is very happy with care on 10th floor            Review of Systems   Unable to assess secondary to condition.    Objective:     Temp:  [96.9 °F (36.1 °C)-99.6 °F (37.6 °C)]   Pulse:  []   Resp:  [15-22]   BP: (116-153)/(63-95)   SpO2:  [96 %-100 %]       Physical Exam:  Constitutional: near baselin from previous, comfortable. on rA, stable. Closer to previous days.  Head: Normocephalic and atraumatic.   Mouth/Throat: Oropharynx is clear and moist.   Eyes: EOM are normal. Pupils are equal, round, and reactive to light. No scleral icterus.   Neck: Normal range of motion. Neck supple.   Cardiovascular: tachy at 110.  No murmur heard.  Pulmonary/Chest: mild increased  RR, no resp distress, calm breathing, lung sounds equal Right chest all permacath, covered in tegrederm.    Abdominal: Soft. No grimacing Bowel sounds are normal.  No distension or tenderness. PEG in place. Chronic redness mild.   Musculoskeletal: contractures in arms, legs.   Neurological: non communicative, baseline.  Skin: Skin is warm and dry.   Psychiatric: at baseline, noncommunicative.back to baseline, not agitated.    Recent Labs   Lab 10/02/20  0405 10/03/20  0517 10/03/20  1350 10/04/20  0208 10/05/20  0316 10/06/20  0259   WBC 10.13 11.99 14.23* 16.11* 10.27 10.13   HGB 9.3* 9.5* 9.1* 8.8* 8.4* 8.2*   HCT 30.3* 31.1* 30.0* 28.8* 27.8* 26.9*   * 735* 702* 673* 618* 634*     Recent Labs   Lab 10/04/20  0208 10/05/20  0316 10/06/20  0259    135* 137   K 3.4* 3.7 3.6    103 102   CO2 26 24 26   BUN 8 7 7   CREATININE 0.4* 0.4* 0.5   GLU 99 116* 118*   CALCIUM 8.8 8.5* 8.3*   MG 1.8 1.7 1.7   PHOS 3.5 3.3 2.3*     Recent Labs   Lab 10/04/20  0208 10/05/20  0316 10/06/20  0259   ALKPHOS 89 79 80   ALT 8* 8* 9*   AST 12 15 12   ALBUMIN 2.2* 2.0* 2.0*   PROT 6.9 6.5 6.2   BILITOT 0.2 0.1 0.1     Recent Labs   Lab 09/30/20  1104   POCTGLUCOSE 160*        albuterol-ipratropium  3 mL Nebulization Q4H    ascorbic acid (vitamin C)  500 mg Per G Tube Daily    baclofen  10 mg Per G Tube TID    ceFEPime (MAXIPIME) IVPB  1 g Intravenous Q8H    gabapentin  250 mg Per G Tube QHS    heparin (porcine)  5,000 Units Subcutaneous Q12H    Lactobacillus rhamnosus GG  1 capsule Per G Tube Daily    loperamide  2 mg Per G Tube QID    metroNIDAZOLE  500 mg Per G Tube TID    multivitamin liquid no.118  10 mL Per G Tube Daily    mupirocin   Nasal BID    PHENobarbitaL  100 mg Per G Tube QHS    propranoloL  20 mg Per G Tube TID    vancomycin (VANCOCIN) IVPB  750 mg Intravenous Q8H       Assessment and Plan     Mr. Glen Moscoso is a 24 y.o. male who presented to [facility] on 9/20/2020 with     Sepsis  Pneumoperitoneum  Colitis   · Patient was afebrile with stable WBC with resolution of sepsis until patient had  temp of 100.9 F and slight increase in WBC to 12 o 9/30--> iproved to 10 now, afebrile overnight, CT chset/abd with no signs of pneumonia, no signs of any worsening infections, pneumoperitoneum resolved. Continue broad spectrum per ID recs, has permacath for antibitoics given difficult IV access from contractures  · LTAC and CM/SW working on placement for patient for continued medical care.   · General surgery consulted on admit for pneumoperitoneum seen on admit CT scan of abdomen and pelvis. General surgery felt likely contained leak and very small bowel perforation related to recent colitis. Due to patient's very poor functional level at baseline and malnutrition felt to be very poor surgical candidate and recommended conservative.  · ID consulted and recommended IV Vancomycin, Cefepime and Flagyl to treat both small perforation and osteomyelitis of pelvic area and to extend abx until 10/22. IR to place tunneled catheter on 9/28 for long term antibiotics as current midline cannot be extended that long in future and bedside PICC team unable to place bedside PICC due to severe arm contractures.   · ID recommends repeat CT scan of abdomen to re-evaluate perforation in 2-3 weeks or sooner if any acute worsening (done on 9/30 and improved)    ACute hypoxemic respiratory failure  Aspiration pneumonia  Pneumonitis  -rapid response and episode on 10/3 x 2 after fever on day previously 10/2 and mild tachy x last few days.  -CXR infiltrates, positioning vs pneumonitis on repeat 10/4. To ICU, nebs, suctioning after NRB and copious secretions  -improved rapidly tehre from 10L to 2L, stepped down on 10/4 after watching overnigt  -on RA now. Continue very close oral and resp care now, no adjustments to antibiotics long term per ID. Unsure events leading to aspiration, no seizures, has not had issue in weeks prior and since step down has not had issue again.  -wbc 16-->10. resp Cx with rare yeast, serratia,  colonixer at this  "point per ID, no change to final recs, signed off.  -blood Cx ng  CTA neg for PE on 10/6, compatibel with the opacities from the aspiration event. On room air since step down, stable.           Decubitus ulcer of ischial area, left, stage IV  Pressure injury of right ischium, stage 4  Osteomyelitis of pelvis  · Controlled. Wounds are improving. Continue current wound care (following in house known well to their team)  · Wounds appear clean with no definite signs of infection.   · As per wound care "Nursing to cleanse scrotal skin breakdown and R and L ischial pressure injuries with wound cleanser. Pack ischial pressure injuries with Triad ointment and gauze, then cover bordered foam dressing to prove autolytic debridement and prevent breakdown of intact skin. Apply Triad ointment over scrotal skin breakdown."  · Patient now with exposed bone to ischial areas and ID with concern for underlying osteomyelitis even though wounds themselves do not appear infected so recommend extension of long term abx with IV Vancomycin, Cefepime and po Flagyl until at least 10/22. IR placed tunneled line on 9/28 for long term antibiotics to right chest wall.   · -avoiding rectal tube due to recent pneumoperitoneum     Severe protein-calorie malnutrition  · Present on admit and related to colitis, recent medical illnesses. Patient with PEG and receiving TF with Prebio to help with nutrition. Likely cause of poor wound healing and development of ischial ulcers.   · Nutrition consulted and following and continue Peptamen with Prebio at 40 mL/hr as per nutrition recs to treat malnutrition. at goal now on feeds  10/1. Held during rapid episode but resumed in ICU once stabilized and tolerating rubin at goal without issue        Seizure disorder  Chronic and controlled and continue Phenobarbital to treat.   -concern may be having subclinical seizures during rapid on 10/3, EEG was negative, push events for eye motions x 3 were also not seizures " on eeg.        Anemia of chronic disease  Chronic and controlled and related to chronic infection, poor nutrition and poor baseline function status. Monitor anemia with daily CBC and transfuse if Hgb < 7.   -stable at mid 9's now. Required 1 tx on last admit and stable since  -mild downtick lats few days small, in 8's since step down, 7.7 today. Watch closey, had resistance with in/out cath but no bloody urine reported nor dark stools.        Hypophosphatemia  · Replace PRN     Cerebral palsy  Chronic condition. Patient with poor baseline functional status and non-communicative and bed bound/wheelchair bound with extremity contractures to all extremities at baseline.   Requires 24/7 care at home by mom who is very involved in his care at baseline    Tachycardia  -chronic due to CP and reflex tachy issues, on home BB. At home usually rates more 90s per mom.  -given sedentary more than usual in hospital now and fevers, will check D dimer  (1..0) and repeat echo for any signs for PE. Had UE LE 2 weeks ago negative for fever. Has had superficial clots in UE in last month from lines  -if fever and tachy persist, will check CTA this weekend. Will resume at least px lovenox now.  -wells score of 3, moderate risk.  10/3: possible aspiration causing this the last 2 days, unclear, is still tachy but had events today for this also  10/5: still occurring at times  overnight. LE U/S neg for DVT in ICU. May be subclinical aspiration still going on. Still have low threshold to consider CTA but considering recent episode of aspiration, would hold off for now at least as still other reasons given recent aspiration as cause as do not want to risk event in scanner, not convincing enough for FD lovenox yet either. Trend for now, discussed 10/6 and will proceed with CTA ot rule out any other cause we have not checked for the low grade tachy with his mom. Ordered.  10/7: CTA negative, HR has been better the last 2 days more .  "dont want to mask response but have not found source or anything, only thing to consider would be retention as possibility if correlates.    Urinary retention  -occurred x 2 in last few days, US renal with incomplete bladder distension, no hydro or that variety. May be a precipitant of worsening tachy on baseline mild tach, watch closely, if has worsening tachy over 120 consider bladder can or straight cath. Happened once at home per mom and HH had "Resistance and blood clot". Nursing here reports mild resistance also.  -UA without abnormalities 10/6      Diet:  tF resumed in ICU  DVT PPx:  Teds/scds. lovenox.       Disposition: sending ltac referrals again as we were still working on accepting facility but need to watch a few days given events of Saturday to ensure resp status is stable but send referrals as was difficult placing last week when stable  Watch for urine retention and hg.    Mom wants to change all care here to [facility] so will ask CM to assist in setting up new PCP, ID follow up, and will need other follow ups to establish care per mom with neuro, ortho long term but those are not as acute as the above as chronic long term issues from previous to establish care with      Discharge Planning   ROCÍO: 10/8/2020     Code Status: Full Code   Is the patient medically ready for discharge?: Yes    Reason for patient still in hospital (select all that apply): Patient trending condition  Discharge Plan A: Long-term acute care facility (LTAC)   Discharge Delays: None known at this time  "

## 2020-10-07 NOTE — PLAN OF CARE
10/07/20 1109   Post-Acute Status   Post-Acute Authorization Placement   Post-Acute Placement Status Referrals Sent   Felisha attempted to contact Cassy with [facility], but no answer and her voice mail box is not set up. Will continue to attempt to contact.    UPDATE:1:12 PM  FELISHA spoke with admissions at [facility] and they reported pt had been denied.   SW resent referral to [facility] and spoke to liapj Montalvo. He stated he would have to see if any Medicaid beds are available.  FELISHA also spoke to Sharmin at [facility]. Pt has been financially accepted, but will have to see if pt is clinically accepted. There is also still a long waiting list.  FELISHA sent additional referrals to [facility], [facility] and [facility].    Zhanna Paulino, LU  [facility]  [phone]

## 2020-10-07 NOTE — PLAN OF CARE
Plan of care discussed with patient and mother. Patient is free of fall/trauma/injury. Nonverbal cues of pain/discomfort absent. IV Vancomycin and Cefepime administered. Tube feeding at 40 ml/hr.  All questions addressed. Will continue to monitor

## 2020-10-07 NOTE — PROGRESS NOTES
Wound care follow up:     R and L ischial pressure injuries continuing to improve with increased granulation and decrease in nonviable tissue to wound bed. Nursing to continue with orders in placed. Wound care to continue to follow pt PRN [id number]    Left ischium      Right ischium

## 2020-10-08 PROBLEM — J96.01 ACUTE HYPOXEMIC RESPIRATORY FAILURE: Status: RESOLVED | Noted: 2020-10-03 | Resolved: 2020-10-08

## 2020-10-08 PROBLEM — R50.9 FEVER: Status: RESOLVED | Noted: 2020-09-03 | Resolved: 2020-10-08

## 2020-10-08 LAB
ALBUMIN SERPL BCP-MCNC: 2 G/DL (ref 3.5–5.2)
ALP SERPL-CCNC: 71 U/L (ref 55–135)
ALT SERPL W/O P-5'-P-CCNC: 9 U/L (ref 10–44)
ANION GAP SERPL CALC-SCNC: 8 MMOL/L (ref 8–16)
AST SERPL-CCNC: 17 U/L (ref 10–40)
BACTERIA BLD CULT: NORMAL
BASOPHILS # BLD AUTO: 0.02 K/UL (ref 0–0.2)
BASOPHILS NFR BLD: 0.3 % (ref 0–1.9)
BILIRUB SERPL-MCNC: 0.1 MG/DL (ref 0.1–1)
BUN SERPL-MCNC: 8 MG/DL (ref 6–20)
CALCIUM SERPL-MCNC: 8.6 MG/DL (ref 8.7–10.5)
CHLORIDE SERPL-SCNC: 102 MMOL/L (ref 95–110)
CO2 SERPL-SCNC: 27 MMOL/L (ref 23–29)
CREAT SERPL-MCNC: 0.5 MG/DL (ref 0.5–1.4)
DIFFERENTIAL METHOD: ABNORMAL
EOSINOPHIL # BLD AUTO: 0 K/UL (ref 0–0.5)
EOSINOPHIL NFR BLD: 0.1 % (ref 0–8)
ERYTHROCYTE [DISTWIDTH] IN BLOOD BY AUTOMATED COUNT: 13.8 % (ref 11.5–14.5)
EST. GFR  (AFRICAN AMERICAN): >60 ML/MIN/1.73 M^2
EST. GFR  (NON AFRICAN AMERICAN): >60 ML/MIN/1.73 M^2
GLUCOSE SERPL-MCNC: 93 MG/DL (ref 70–110)
HCT VFR BLD AUTO: 26.4 % (ref 40–54)
HGB BLD-MCNC: 8.1 G/DL (ref 14–18)
IMM GRANULOCYTES # BLD AUTO: 0.02 K/UL (ref 0–0.04)
IMM GRANULOCYTES NFR BLD AUTO: 0.3 % (ref 0–0.5)
LYMPHOCYTES # BLD AUTO: 1.7 K/UL (ref 1–4.8)
LYMPHOCYTES NFR BLD: 22.4 % (ref 18–48)
MAGNESIUM SERPL-MCNC: 1.9 MG/DL (ref 1.6–2.6)
MCH RBC QN AUTO: 29.7 PG (ref 27–31)
MCHC RBC AUTO-ENTMCNC: 30.7 G/DL (ref 32–36)
MCV RBC AUTO: 97 FL (ref 82–98)
MONOCYTES # BLD AUTO: 1.1 K/UL (ref 0.3–1)
MONOCYTES NFR BLD: 14.2 % (ref 4–15)
NEUTROPHILS # BLD AUTO: 4.6 K/UL (ref 1.8–7.7)
NEUTROPHILS NFR BLD: 62.7 % (ref 38–73)
NRBC BLD-RTO: 0 /100 WBC
PHOSPHATE SERPL-MCNC: 3.3 MG/DL (ref 2.7–4.5)
PLATELET # BLD AUTO: 652 K/UL (ref 150–350)
PMV BLD AUTO: 9.3 FL (ref 9.2–12.9)
POTASSIUM SERPL-SCNC: 4.1 MMOL/L (ref 3.5–5.1)
PROT SERPL-MCNC: 6.5 G/DL (ref 6–8.4)
RBC # BLD AUTO: 2.73 M/UL (ref 4.6–6.2)
SODIUM SERPL-SCNC: 137 MMOL/L (ref 136–145)
VANCOMYCIN TROUGH SERPL-MCNC: 22 UG/ML (ref 10–22)
WBC # BLD AUTO: 7.38 K/UL (ref 3.9–12.7)

## 2020-10-08 PROCEDURE — 20600001 HC STEP DOWN PRIVATE ROOM

## 2020-10-08 PROCEDURE — 25000003 PHARM REV CODE 250: Performed by: HOSPITALIST

## 2020-10-08 PROCEDURE — 99232 PR SUBSEQUENT HOSPITAL CARE,LEVL II: ICD-10-PCS | Mod: ,,, | Performed by: INTERNAL MEDICINE

## 2020-10-08 PROCEDURE — 80053 COMPREHEN METABOLIC PANEL: CPT

## 2020-10-08 PROCEDURE — 85025 COMPLETE CBC W/AUTO DIFF WBC: CPT

## 2020-10-08 PROCEDURE — 94640 AIRWAY INHALATION TREATMENT: CPT

## 2020-10-08 PROCEDURE — 84100 ASSAY OF PHOSPHORUS: CPT

## 2020-10-08 PROCEDURE — 94761 N-INVAS EAR/PLS OXIMETRY MLT: CPT

## 2020-10-08 PROCEDURE — 94668 MNPJ CHEST WALL SBSQ: CPT

## 2020-10-08 PROCEDURE — 27000221 HC OXYGEN, UP TO 24 HOURS

## 2020-10-08 PROCEDURE — 25000242 PHARM REV CODE 250 ALT 637 W/ HCPCS: Performed by: NURSE PRACTITIONER

## 2020-10-08 PROCEDURE — 99900035 HC TECH TIME PER 15 MIN (STAT)

## 2020-10-08 PROCEDURE — 83735 ASSAY OF MAGNESIUM: CPT

## 2020-10-08 PROCEDURE — 99232 SBSQ HOSP IP/OBS MODERATE 35: CPT | Mod: ,,, | Performed by: INTERNAL MEDICINE

## 2020-10-08 PROCEDURE — 63600175 PHARM REV CODE 636 W HCPCS: Performed by: NURSE PRACTITIONER

## 2020-10-08 PROCEDURE — 63600175 PHARM REV CODE 636 W HCPCS: Performed by: INTERNAL MEDICINE

## 2020-10-08 PROCEDURE — 25000003 PHARM REV CODE 250: Performed by: INTERNAL MEDICINE

## 2020-10-08 PROCEDURE — 63600175 PHARM REV CODE 636 W HCPCS: Performed by: HOSPITALIST

## 2020-10-08 PROCEDURE — 80202 ASSAY OF VANCOMYCIN: CPT

## 2020-10-08 RX ORDER — CEFEPIME HYDROCHLORIDE 2 G/1
2 INJECTION, POWDER, FOR SOLUTION INTRAVENOUS
Status: DISCONTINUED | OUTPATIENT
Start: 2020-10-08 | End: 2020-10-09 | Stop reason: HOSPADM

## 2020-10-08 RX ADMIN — BACLOFEN 10 MG: 10 TABLET ORAL at 03:10

## 2020-10-08 RX ADMIN — PROPRANOLOL HYDROCHLORIDE 20 MG: 20 TABLET ORAL at 11:10

## 2020-10-08 RX ADMIN — MUPIROCIN: 20 OINTMENT TOPICAL at 09:10

## 2020-10-08 RX ADMIN — HEPARIN SODIUM 5000 UNITS: 5000 INJECTION INTRAVENOUS; SUBCUTANEOUS at 09:10

## 2020-10-08 RX ADMIN — IPRATROPIUM BROMIDE AND ALBUTEROL SULFATE 3 ML: .5; 2.5 SOLUTION RESPIRATORY (INHALATION) at 07:10

## 2020-10-08 RX ADMIN — IPRATROPIUM BROMIDE AND ALBUTEROL SULFATE 3 ML: .5; 2.5 SOLUTION RESPIRATORY (INHALATION) at 11:10

## 2020-10-08 RX ADMIN — PHENOBARBITAL 100 MG: 20 ELIXIR ORAL at 09:10

## 2020-10-08 RX ADMIN — Medication 10 ML: at 09:10

## 2020-10-08 RX ADMIN — PROPRANOLOL HYDROCHLORIDE 20 MG: 20 TABLET ORAL at 09:10

## 2020-10-08 RX ADMIN — METRONIDAZOLE 500 MG: 500 TABLET ORAL at 03:10

## 2020-10-08 RX ADMIN — VANCOMYCIN HYDROCHLORIDE 750 MG: 750 INJECTION, POWDER, LYOPHILIZED, FOR SOLUTION INTRAVENOUS at 11:10

## 2020-10-08 RX ADMIN — BACLOFEN 10 MG: 10 TABLET ORAL at 09:10

## 2020-10-08 RX ADMIN — CEFEPIME 2 G: 2 INJECTION, POWDER, FOR SOLUTION INTRAVENOUS at 09:10

## 2020-10-08 RX ADMIN — IPRATROPIUM BROMIDE AND ALBUTEROL SULFATE 3 ML: .5; 2.5 SOLUTION RESPIRATORY (INHALATION) at 03:10

## 2020-10-08 RX ADMIN — PROPRANOLOL HYDROCHLORIDE 20 MG: 20 TABLET ORAL at 03:10

## 2020-10-08 RX ADMIN — Medication 500 MG: at 09:10

## 2020-10-08 RX ADMIN — Medication 1 CAPSULE: at 09:10

## 2020-10-08 RX ADMIN — LOPERAMIDE HYDROCHLORIDE 2 MG: 1 SOLUTION ORAL at 09:10

## 2020-10-08 RX ADMIN — METRONIDAZOLE 500 MG: 500 TABLET ORAL at 09:10

## 2020-10-08 RX ADMIN — MUPIROCIN: 20 OINTMENT TOPICAL at 11:10

## 2020-10-08 RX ADMIN — LOPERAMIDE HYDROCHLORIDE 2 MG: 1 SOLUTION ORAL at 05:10

## 2020-10-08 RX ADMIN — CEFEPIME 2 G: 2 INJECTION, POWDER, FOR SOLUTION INTRAVENOUS at 02:10

## 2020-10-08 RX ADMIN — VANCOMYCIN HYDROCHLORIDE 750 MG: 750 INJECTION, POWDER, LYOPHILIZED, FOR SOLUTION INTRAVENOUS at 03:10

## 2020-10-08 RX ADMIN — CEFEPIME 1 G: 1 INJECTION, POWDER, FOR SOLUTION INTRAMUSCULAR; INTRAVENOUS at 03:10

## 2020-10-08 RX ADMIN — GABAPENTIN 250 MG: 250 SOLUTION ORAL at 11:10

## 2020-10-08 RX ADMIN — LOPERAMIDE HYDROCHLORIDE 2 MG: 1 SOLUTION ORAL at 03:10

## 2020-10-08 RX ADMIN — IPRATROPIUM BROMIDE AND ALBUTEROL SULFATE 3 ML: .5; 2.5 SOLUTION RESPIRATORY (INHALATION) at 12:10

## 2020-10-08 NOTE — CARE UPDATE
Rapid Response Nurse Chart Check     Chart check completed, abnormal VS noted.   -120s, sustaining.   Pt on telemetry.   Please call [phone] for further concerns or assistance.

## 2020-10-08 NOTE — PLAN OF CARE
10/08/20 1311   Post-Acute Status   Post-Acute Authorization Placement   Post-Acute Placement Status Pending Payor Review   SW spoke with Raymundo from [facility] ([phone]).  She stated they have clinically accepted pt and will submit for auth.    Zhanna Paulino LMSW  [facility]  [phone]

## 2020-10-08 NOTE — NURSING
POC reviewed with patient's mother present at bedside, verbalized understanding. VSS on RA via tele ex. HR sinus tachy. NPO, tube feed @ 40ml/hr tolerating well. R subclavian PAC with KVO fluids infusing. Turns Q2 and frequent checks performed. Sacral and scrotal wounds treated per order. Vanc trough @ 1800. Bed in lowest position, wheels locked, call light in reach, mother present at bedside.

## 2020-10-08 NOTE — PROGRESS NOTES
Pharmacokinetic Assessment Follow Up: IV Vancomycin    Vancomycin serum concentration assessment(s):    The trough level was drawn about 45 minutes early but still can be used to guide therapy at this time. The measurement is slightly above the desired definitive target range of 15-20 mcg/ml.    Vancomycin Regimen Plan:    The true trough is likely within range--will check trough again this am      Drug levels (last 3 results):  Recent Labs   Lab Result Units 10/05/20  0316 10/06/20  1007 10/07/20  1729   Vancomycin, Random ug/mL 11.5  --   --    Vancomycin-Trough ug/mL  --  10.9 21.7       Pharmacy will continue to follow and monitor vancomycin.    Please contact pharmacy  for questions regarding this assessment.    Thank you for the consult,   Elissa Cordon       Patient brief summary:  Glen Moscoso is a 24 y.o. male initiated on antimicrobial therapy with IV Vancomycin for treatment of lower respiratory infection    The patient's current regimen is 750 mg q8h    Drug Allergies:   Review of patient's allergies indicates:  No Known Allergies        Renal Function:   Estimated Creatinine Clearance: 139.3 mL/min (A) (based on SCr of 0.4 mg/dL (L)).,       CBC (last 72 hours):  Recent Labs   Lab Result Units 10/05/20  0316 10/06/20  0259 10/07/20  0344   WBC K/uL 10.27 10.13 8.22   Hemoglobin g/dL 8.4* 8.2* 7.7*   Hematocrit % 27.8* 26.9* 24.7*   Platelets K/uL 618* 634* 584*   Gran% % 76.0* 74.8* 69.9   Lymph% % 13.0* 14.0* 15.6*   Mono% % 10.3 10.7 13.4   Eosinophil% % 0.1 0.0 0.7   Basophil% % 0.3 0.2 0.2   Differential Method  Automated Automated Automated       Metabolic Panel (last 72 hours):  Recent Labs   Lab Result Units 10/05/20  0316 10/06/20  0259 10/06/20  0626 10/07/20  0344   Sodium mmol/L 135* 137  --  137   Potassium mmol/L 3.7 3.6  --  3.6   Chloride mmol/L 103 102  --  102   CO2 mmol/L 24 26  --  27   Glucose mg/dL 116* 118*  --  159*   Glucose, UA   --   --  Negative  --    BUN, Bld mg/dL 7 7  --   6   Creatinine mg/dL 0.4* 0.5  --  0.4*   Albumin g/dL 2.0* 2.0*  --  1.8*   Total Bilirubin mg/dL 0.1 0.1  --  <0.1*   Alkaline Phosphatase U/L 79 80  --  68   AST U/L 15 12  --  17   ALT U/L 8* 9*  --  8*   Magnesium mg/dL 1.7 1.7  --  1.7   Phosphorus mg/dL 3.3 2.3*  --  2.8       Vancomycin Administrations:  vancomycin given in the last 96 hours                   vancomycin 750 mg in dextrose 5 % 250 mL IVPB (ready to mix system) (mg) 750 mg New Bag 10/07/20 1818     750 mg New Bag  1100     750 mg New Bag  0247     750 mg New Bag 10/06/20 1836    vancomycin (VANCOCIN) 500 mg in dextrose 5 % 100 mL IVPB (mg) 500 mg New Bag 10/06/20 1112     500 mg New Bag  0250     500 mg New Bag 10/05/20 1844     500 mg New Bag  1347    vancomycin 1.25 g in dextrose 5% 250 mL IVPB (ready to mix) (mg) 1,250 mg New Bag 10/04/20 0919                Microbiologic Results:  Microbiology Results (last 7 days)     Procedure Component Value Units Date/Time    Blood culture [[id number]] Collected: 10/03/20 1515    Order Status: Completed Specimen: Blood from Peripheral, Hand, Right Updated: 10/07/20 1612     Blood Culture, Routine No Growth to date      No Growth to date      No Growth to date      No Growth to date      No Growth to date    Narrative:      Blood cultures from 2 different sites. 4 bottles total.  Please draw before starting antibiotics.    Blood culture [[id number]] Collected: 10/04/20 0133    Order Status: Completed Specimen: Blood from Peripheral, Lower Arm, Left Updated: 10/07/20 0612     Blood Culture, Routine No Growth to date      No Growth to date      No Growth to date      No Growth to date    Narrative:      Blood cultures x 2 different sites. 4 bottles total. Please  draw cultures before administering antibiotics.    Culture, Respiratory with Gram Stain [[id number]]  (Abnormal)  (Susceptibility) Collected: 10/03/20 1749    Order Status: Completed Specimen: Respiratory from Endotracheal Aspirate Updated: 10/06/20  0947     Respiratory Culture No S aureus or Pseudomonas isolated.      SERRATIA MARCESCENS  Few       Gram Stain (Respiratory) <10 epithelial cells per low power field.     Gram Stain (Respiratory) Rare WBC's     Gram Stain (Respiratory) Rare budding yeast    Narrative:      Mini-BAL. Before antibiotics.

## 2020-10-08 NOTE — PLAN OF CARE
Pt alert, disoriented x4. Aphasic. Mother at bedside. NSR-ST on telemetry.  in place, spO2 WNL. Tmax 100.9; prn tylenol given. NPO. Tolerating TF @ 40 mL/hr via PEG tube. Incontinent to bowel and bladder. Brief in place, multiple urine occurences. Loose brown BM x1. Wound care done prn per orders with brief changes. Pt turned q2h with pillows. Sleeping between care. Call light within reach, bed in lowest position. WCTM.

## 2020-10-09 VITALS
RESPIRATION RATE: 20 BRPM | BODY MASS INDEX: 14.71 KG/M2 | OXYGEN SATURATION: 96 % | HEART RATE: 75 BPM | WEIGHT: 74.94 LBS | DIASTOLIC BLOOD PRESSURE: 79 MMHG | TEMPERATURE: 98 F | SYSTOLIC BLOOD PRESSURE: 109 MMHG | HEIGHT: 60 IN

## 2020-10-09 LAB
ALBUMIN SERPL BCP-MCNC: 2 G/DL (ref 3.5–5.2)
ALP SERPL-CCNC: 70 U/L (ref 55–135)
ALT SERPL W/O P-5'-P-CCNC: 7 U/L (ref 10–44)
ANION GAP SERPL CALC-SCNC: 9 MMOL/L (ref 8–16)
AST SERPL-CCNC: 15 U/L (ref 10–40)
BACTERIA BLD CULT: NORMAL
BASOPHILS # BLD AUTO: 0.02 K/UL (ref 0–0.2)
BASOPHILS NFR BLD: 0.2 % (ref 0–1.9)
BILIRUB SERPL-MCNC: 0.1 MG/DL (ref 0.1–1)
BUN SERPL-MCNC: 9 MG/DL (ref 6–20)
CALCIUM SERPL-MCNC: 8.5 MG/DL (ref 8.7–10.5)
CHLORIDE SERPL-SCNC: 102 MMOL/L (ref 95–110)
CO2 SERPL-SCNC: 26 MMOL/L (ref 23–29)
CREAT SERPL-MCNC: 0.5 MG/DL (ref 0.5–1.4)
DIFFERENTIAL METHOD: ABNORMAL
EOSINOPHIL # BLD AUTO: 0 K/UL (ref 0–0.5)
EOSINOPHIL NFR BLD: 0 % (ref 0–8)
ERYTHROCYTE [DISTWIDTH] IN BLOOD BY AUTOMATED COUNT: 13.9 % (ref 11.5–14.5)
EST. GFR  (AFRICAN AMERICAN): >60 ML/MIN/1.73 M^2
EST. GFR  (NON AFRICAN AMERICAN): >60 ML/MIN/1.73 M^2
GLUCOSE SERPL-MCNC: 148 MG/DL (ref 70–110)
HCT VFR BLD AUTO: 26.8 % (ref 40–54)
HGB BLD-MCNC: 8.1 G/DL (ref 14–18)
IMM GRANULOCYTES # BLD AUTO: 0.03 K/UL (ref 0–0.04)
IMM GRANULOCYTES NFR BLD AUTO: 0.3 % (ref 0–0.5)
LYMPHOCYTES # BLD AUTO: 1.3 K/UL (ref 1–4.8)
LYMPHOCYTES NFR BLD: 12.3 % (ref 18–48)
MAGNESIUM SERPL-MCNC: 1.8 MG/DL (ref 1.6–2.6)
MCH RBC QN AUTO: 29.6 PG (ref 27–31)
MCHC RBC AUTO-ENTMCNC: 30.2 G/DL (ref 32–36)
MCV RBC AUTO: 98 FL (ref 82–98)
MONOCYTES # BLD AUTO: 1.3 K/UL (ref 0.3–1)
MONOCYTES NFR BLD: 12.2 % (ref 4–15)
NEUTROPHILS # BLD AUTO: 7.7 K/UL (ref 1.8–7.7)
NEUTROPHILS NFR BLD: 75 % (ref 38–73)
NRBC BLD-RTO: 0 /100 WBC
PHOSPHATE SERPL-MCNC: 2.6 MG/DL (ref 2.7–4.5)
PLATELET # BLD AUTO: 669 K/UL (ref 150–350)
PMV BLD AUTO: 9 FL (ref 9.2–12.9)
POCT GLUCOSE: 110 MG/DL (ref 70–110)
POTASSIUM SERPL-SCNC: 3.8 MMOL/L (ref 3.5–5.1)
PROT SERPL-MCNC: 6.4 G/DL (ref 6–8.4)
RBC # BLD AUTO: 2.74 M/UL (ref 4.6–6.2)
SODIUM SERPL-SCNC: 137 MMOL/L (ref 136–145)
WBC # BLD AUTO: 10.25 K/UL (ref 3.9–12.7)

## 2020-10-09 PROCEDURE — 63600175 PHARM REV CODE 636 W HCPCS: Performed by: INTERNAL MEDICINE

## 2020-10-09 PROCEDURE — 94640 AIRWAY INHALATION TREATMENT: CPT

## 2020-10-09 PROCEDURE — 85025 COMPLETE CBC W/AUTO DIFF WBC: CPT

## 2020-10-09 PROCEDURE — 36415 COLL VENOUS BLD VENIPUNCTURE: CPT

## 2020-10-09 PROCEDURE — 84100 ASSAY OF PHOSPHORUS: CPT

## 2020-10-09 PROCEDURE — 25000003 PHARM REV CODE 250: Performed by: INTERNAL MEDICINE

## 2020-10-09 PROCEDURE — 80053 COMPREHEN METABOLIC PANEL: CPT

## 2020-10-09 PROCEDURE — 99239 PR HOSPITAL DISCHARGE DAY,>30 MIN: ICD-10-PCS | Mod: ,,, | Performed by: INTERNAL MEDICINE

## 2020-10-09 PROCEDURE — 99239 HOSP IP/OBS DSCHRG MGMT >30: CPT | Mod: ,,, | Performed by: INTERNAL MEDICINE

## 2020-10-09 PROCEDURE — 25000003 PHARM REV CODE 250: Performed by: HOSPITALIST

## 2020-10-09 PROCEDURE — 25000242 PHARM REV CODE 250 ALT 637 W/ HCPCS: Performed by: NURSE PRACTITIONER

## 2020-10-09 PROCEDURE — 94668 MNPJ CHEST WALL SBSQ: CPT

## 2020-10-09 PROCEDURE — 94760 N-INVAS EAR/PLS OXIMETRY 1: CPT

## 2020-10-09 PROCEDURE — 83735 ASSAY OF MAGNESIUM: CPT

## 2020-10-09 RX ORDER — CEFEPIME HYDROCHLORIDE 2 G/1
2 INJECTION, POWDER, FOR SOLUTION INTRAVENOUS EVERY 8 HOURS
Status: ON HOLD
Start: 2020-10-09 | End: 2020-11-03 | Stop reason: HOSPADM

## 2020-10-09 RX ORDER — METRONIDAZOLE 500 MG/1
500 TABLET ORAL 3 TIMES DAILY
Qty: 63 TABLET | Refills: 0 | Status: ON HOLD
Start: 2020-10-09 | End: 2020-11-03 | Stop reason: HOSPADM

## 2020-10-09 RX ADMIN — METRONIDAZOLE 500 MG: 500 TABLET ORAL at 09:10

## 2020-10-09 RX ADMIN — Medication 1 CAPSULE: at 09:10

## 2020-10-09 RX ADMIN — IPRATROPIUM BROMIDE AND ALBUTEROL SULFATE 3 ML: .5; 2.5 SOLUTION RESPIRATORY (INHALATION) at 08:10

## 2020-10-09 RX ADMIN — Medication 10 ML: at 09:10

## 2020-10-09 RX ADMIN — BACLOFEN 10 MG: 10 TABLET ORAL at 09:10

## 2020-10-09 RX ADMIN — MUPIROCIN: 20 OINTMENT TOPICAL at 09:10

## 2020-10-09 RX ADMIN — Medication 500 MG: at 09:10

## 2020-10-09 RX ADMIN — VANCOMYCIN HYDROCHLORIDE 750 MG: 750 INJECTION, POWDER, LYOPHILIZED, FOR SOLUTION INTRAVENOUS at 03:10

## 2020-10-09 RX ADMIN — LOPERAMIDE HYDROCHLORIDE 2 MG: 1 SOLUTION ORAL at 09:10

## 2020-10-09 NOTE — PLAN OF CARE
[facility]     Department of Hospital Medicine     [address] [place] [address]     [phone] after hours  [phone] fax                                        FACILITY TRANSFER ORDERS     10/09/2020    Admit to: [facility]    Diagnoses:  Active Hospital Problems    Diagnosis  POA    Sepsis [A41.9]  Yes     Priority: 1 - High    Decubitus ulcer of ischial area, left, stage IV [L89.324]  Yes     Priority: 2     Severe protein-calorie malnutrition [E43]  Yes     Priority: 3     Seizure disorder [G40.909]  Yes     Priority: 4     Anemia of chronic disease [D63.8]  Yes     Priority: 5     Hypophosphatemia [E83.39]  Yes     Priority: 6     Cerebral palsy [G80.9]  Yes     Priority: 7     Osteomyelitis of pelvis [M86.9]  Yes    Colitis [K52.9]  Yes    Pneumoperitoneum [K66.8]  Yes    Pressure injury of right ischium, stage 4 [L89.314]  Yes    Sinus tachycardia [R00.0]  No      Resolved Hospital Problems    Diagnosis Date Resolved POA    *Acute hypoxemic respiratory failure [J96.01] 10/08/2020 Yes    Fever [R50.9] 10/08/2020 Yes     Priority: 3     Pneumonia due to Pseudomonas aerginuosa and Serratia marcesens [J15.1] 09/24/2020 Yes     Priority: 5     Moderate malnutrition [E44.0] 09/24/2020 Yes     Priority: 10        Vital Signs: Routine.    Allergies:Review of patient's allergies indicates:  No Known Allergies    Code Status: Full Code     Diet: NPO; All medications and nutrition via G tube                          Tube Feeding: Continuous Peptamen 1.5 with Prebio at 40 mL per hour for 24 hours per day      Activities:   - Activity as tolerated   - May use walker, cane, or self-propelled wheelchair    Nursing: Avoid Iglesias catheter  Measure height and weight on admit      Nursing Precautions:     - Aspiration precautions:             - Total assistance with meals            -  Upright 90 degrees befor during and after meals             -  Suction  at bedside          - Fall precautions per nursing home protocol   - Seizure precaution per retirement protocol   - Decubitus precautions:        -  for positioning   - Pressure reducing foam mattress   - Turn patient every two hours. Use wedge pillows to anchor patient    Labs: Per facility protocol; Patient needs at least weekly ESR and CRP and Vancomycin trough to monitor Vancomycin levels       MISCELLANEOUS CARE:  PEG Care:  Monitor skin integrity. Clean site daily and prn.   Infusion Therapy:   SN to perform Infusion Therapy/Central Line Care.  Review Central Line Care & Central Line Flush with patient.    Scrub the Hub: Prior to accessing the line, always perform a 30 second alcohol scrub  Each lumen of the central line is to be flushed at least daily with 10 mL Normal Saline and 3 mL Heparin flush (10 units/mL)  Skilled Nurse (SN) may draw blood from IV access  Blood Draw Procedure:   - Aspirate at least 5 mL of blood   - Discard   - Obtain specimen   - Change injection cap   - Flush with 20 mL Normal Saline followed by a                 3-5 mL Heparin flush (10 units/mL)  Central :   - Sterile dressing changes are done weekly and as needed.   - Use chlor-hexadine scrub to cleanse site, apply Biopatch to insertion site,       apply securement device dressing   - Injection caps are changed weekly and after EVERY lab draw.   - If sterile gauze is under dressing to control oozing,                 dressing change must be performed every 24 hours until gauze is not needed.    WOUND CARE ORDERS:  Pressure Ulcer(s) Stage IV:  Location: Bilateral ischial area   Skin breakdown:  Location: Scrotum    Consult ET nurse    Nursing to provide wound care twice daily and cleanse scrotal skin breakdown and right and left ischial pressure injuries with wound cleanser. Pack ischial pressure injuries with Triad ointment and gauze. Cover bordered foam dressing. Apply triad ointment over scrotal skin  breakdown.    Medications:     Current Discharge Medication List      START taking these medications    Details   acetaminophen (TYLENOL) 325 MG tablet 2 tablets (650 mg total) by Per G Tube route every 4 (four) hours as needed (subjective signs of pain).  Qty:  , Refills: 0      albuterol-ipratropium (DUO-NEB) 2.5 mg-0.5 mg/3 mL nebulizer solution Take 3 mLs by nebulization every 4 (four) hours as needed for Wheezing or Shortness of Breath. Rescue  Qty: 1 Box, Refills: 0      bisacodyL (DULCOLAX) 10 mg Supp Place 1 suppository (10 mg total) rectally daily as needed (Until bowel movement if patient has no bowel movement for 2 days).  Qty:  , Refills: 0      ceFEPIme (MAXIPIME) 2 gram injection Inject 2 g into the vein every 8 (eight) hours. End date 10/22/2020. for 13 days      melatonin (MELATIN) 3 mg tablet 2 tablets (6 mg total) by Per G Tube route nightly as needed for Insomnia.  Qty:  , Refills: 0      metroNIDAZOLE (FLAGYL) 500 MG tablet 1 tablet (500 mg total) by Per G Tube route 3 (three) times daily. End date 10/22/2020. for 13 days  Qty: 63 tablet, Refills: 0      vancomycin HCl (VANCOMYCIN 750 MG/250 ML D5W, READY TO MIX SYSTEM,) Inject 250 mLs (750 mg total) into the vein every 12 (twelve) hours. End date 10/22/2020 for 13 days         CONTINUE these medications which have CHANGED    Details   baclofen (LIORESAL) 10 MG tablet 1 tablet (10 mg total) by Per G Tube route 3 (three) times daily.  Qty: 90 tablet, Refills: 11      Lactobacillus rhamnosus GG (CULTURELLE) 10 billion cell capsule 1 capsule by Per G Tube route once daily.  Qty:        loperamide (IMODIUM) 1 mg/7.5 mL solution 15 mLs (2 mg total) by Per G Tube route 4 (four) times daily. for 10 days  Refills: 0      PHENobarbitaL 20 mg/5 mL (4 mg/mL) Elix elixir 25 mLs (100 mg total) by Per G Tube route every evening.  Qty: 473 mL, Refills: 0      propranoloL (INDERAL) 20 MG tablet 1 tablet (20 mg total) by Per G Tube route 3 (three) times  daily.  Qty: 90 tablet, Refills: 11    Comments: .         CONTINUE these medications which have NOT CHANGED    Details   ascorbic acid, vitamin C, (VITAMIN C) 500 MG tablet 1 tablet (500 mg total) by Per G Tube route once daily.  Qty:        diphenhydrAMINE (BENADRYL) 12.5 mg/5 mL elixir 10 mLs (25 mg total) by Per G Tube route 4 (four) times daily as needed for Allergies.  Refills: 0      gabapentin (NEURONTIN) 250 mg/5 mL solution 5 mLs (250 mg total) by Per G Tube route nightly. At bedtime      multivitamin liquid no.118 Liqd 10 mLs by Per G Tube route once daily.  Qty:                   Follow-up:   Future Appointments   Date Time Provider Department Center   10/22/2020 11:30 AM JO [facility] ID [address]        _________________________________  Nohemi Farris MD  10/09/2020

## 2020-10-09 NOTE — PLAN OF CARE
10/09/20 1138   Post-Acute Status   Post-Acute Authorization Placement   Post-Acute Placement Status Set-up Complete   Pt going to [facility].  SW arranged stretcher transport via [facility]. Requested  time is 12:38 PM Requested  time does not guarantee arrival time.  Nurse can call call report to [phone] and ask for charge nurse.  Floor nurse notifed of the above.    Zhanna Paulino LMSW  [facility]  [phone]

## 2020-10-09 NOTE — PROGRESS NOTES
Pharmacokinetic Assessment Follow Up: IV Vancomycin    Vancomycin serum concentration assessment/plan:  - Vancomycin level draw today is supratherapeutic at 22.5 mcg/ml. Target range previously stated = 15 - 20 mcg/ml.  - It has been difficult to continuously achieve target vancomycin levels within this small range. I recommend targeting a range of 10 - 20 mcg/ml so we don't cause NATALIE.  - Patient is very small and therefore Scr may not be reflective of a kidney injury that is potentially occurring. It seems that a q8h regimen may be too frequent for this patient.  - The patient was stable on 750mg IV every 12 hours for almost a whole week (9/21 - 9/30) with acceptable vancomycin levels. Therefore I will re-start this regimen (to start 12 hours from previous dose of 750mg)  - Obtain trough prior to 13:00 dose on 10/10    Drug levels (last 3 results):  Recent Labs   Lab Result Units 10/06/20  1007 10/07/20  1729 10/08/20  1815   Vancomycin-Trough ug/mL 10.9 21.7 22.0       Pharmacy will continue to follow and monitor vancomycin.    Please contact pharmacy at extension [phone] for questions regarding this assessment.    Thank you for the consult,   Erika Bazan       Patient brief summary:  Glen Moscoso is a 24 y.o. male initiated on antimicrobial therapy with IV Vancomycin for treatment of pneumoperitoneum/PNA/decubitus ulcer    The patient's current regimen is 750mg IV every 8 hours    Drug Allergies:   Review of patient's allergies indicates:  No Known Allergies    Actual Body Weight:   34 kg    Renal Function:   Estimated Creatinine Clearance: 111.4 mL/min (based on SCr of 0.5 mg/dL).,     CBC (last 72 hours):  Recent Labs   Lab Result Units 10/06/20  0259 10/07/20  0344 10/08/20  0330   WBC K/uL 10.13 8.22 7.38   Hemoglobin g/dL 8.2* 7.7* 8.1*   Hematocrit % 26.9* 24.7* 26.4*   Platelets K/uL 634* 584* 652*   Gran% % 74.8* 69.9 62.7   Lymph% % 14.0* 15.6* 22.4   Mono% % 10.7 13.4 14.2   Eosinophil% % 0.0 0.7 0.1    Basophil% % 0.2 0.2 0.3   Differential Method  Automated Automated Automated       Metabolic Panel (last 72 hours):  Recent Labs   Lab Result Units 10/06/20  0259 10/06/20  0626 10/07/20  0344 10/08/20  0330   Sodium mmol/L 137  --  137 137   Potassium mmol/L 3.6  --  3.6 4.1   Chloride mmol/L 102  --  102 102   CO2 mmol/L 26  --  27 27   Glucose mg/dL 118*  --  159* 93   Glucose, UA   --  Negative  --   --    BUN, Bld mg/dL 7  --  6 8   Creatinine mg/dL 0.5  --  0.4* 0.5   Albumin g/dL 2.0*  --  1.8* 2.0*   Total Bilirubin mg/dL 0.1  --  <0.1* 0.1   Alkaline Phosphatase U/L 80  --  68 71   AST U/L 12  --  17 17   ALT U/L 9*  --  8* 9*   Magnesium mg/dL 1.7  --  1.7 1.9   Phosphorus mg/dL 2.3*  --  2.8 3.3       Vancomycin Administrations:  vancomycin given in the last 96 hours                   vancomycin 750 mg in dextrose 5 % 250 mL IVPB (ready to mix system) (mg) 750 mg New Bag 10/08/20 1137     750 mg New Bag  0316     750 mg New Bag 10/07/20 1818     750 mg New Bag  1100     750 mg New Bag  0247     750 mg New Bag 10/06/20 1836    vancomycin (VANCOCIN) 500 mg in dextrose 5 % 100 mL IVPB (mg) 500 mg New Bag 10/06/20 1112     500 mg New Bag  0250     500 mg New Bag 10/05/20 1844     500 mg New Bag  1347                Microbiologic Results:  Microbiology Results (last 7 days)     Procedure Component Value Units Date/Time    Blood culture [[id number]] Collected: 10/03/20 1515    Order Status: Completed Specimen: Blood from Peripheral, Hand, Right Updated: 10/08/20 1612     Blood Culture, Routine No growth after 5 days.    Narrative:      Blood cultures from 2 different sites. 4 bottles total.  Please draw before starting antibiotics.    Blood culture [[id number]] Collected: 10/04/20 0133    Order Status: Completed Specimen: Blood from Peripheral, Lower Arm, Left Updated: 10/08/20 0612     Blood Culture, Routine No Growth to date      No Growth to date      No Growth to date      No Growth to date      No Growth  to date    Narrative:      Blood cultures x 2 different sites. 4 bottles total. Please  draw cultures before administering antibiotics.    Culture, Respiratory with Gram Stain [[id number]]  (Abnormal)  (Susceptibility) Collected: 10/03/20 0554    Order Status: Completed Specimen: Respiratory from Endotracheal Aspirate Updated: 10/06/20 0977     Respiratory Culture No S aureus or Pseudomonas isolated.      SERRATIA MARCESCENS  Few       Gram Stain (Respiratory) <10 epithelial cells per low power field.     Gram Stain (Respiratory) Rare WBC's     Gram Stain (Respiratory) Rare budding yeast    Narrative:      Mini-BAL. Before antibiotics.

## 2020-10-09 NOTE — PLAN OF CARE
Patient will be discharged to [facility]. Transport will be set up through [facility].        10/09/20 1029   Final Note   Assessment Type Final Discharge Note   Anticipated Discharge Disposition LTAC   Discharge plans and expectations educations in teach back method with documentation complete? Yes   Right Care Referral Info   Post Acute Recommendation Other   Facility Name [facility]   Post-Acute Status   Post-Acute Authorization Placement   Post-Acute Placement Status Set-up Complete     Future Appointments   Date Time Provider Department Center   10/22/2020 11:30 AM Lance Granados Jr., PA [facility] ID [address] RN, CM   Ext: [phone]

## 2020-10-09 NOTE — PLAN OF CARE
10/09/20 1043   Post-Acute Status   Post-Acute Authorization Placement   Post-Acute Placement Status Authorization Obtained   Pt accepted to [facility] and can admit today. SW waiting on report info and will then set up stretcher transport.    Zhanna Paulino LMSW  [facility]  [phone]

## 2020-10-09 NOTE — ASSESSMENT & PLAN NOTE
Pneumoperitoneum  Colitis  · Sepsis resolved and pneumoperitoneum resolved. No acute abdominal signs on exam.   · Patient was afebrile with stable WBC with resolution of sepsis until patient had temp of 100.9 F and slight increase in WBC to 21,800 today, 9/30. Patient's abdominal exam overall is largely unchanged and lungs are clear but patient has known colon issue and concern due to his noncommunicating state making a good exam difficult. Repeat CT scan done and showed improvement in colitis and resolution of pneumoperitoneum.   · LTAC and CM/SW working on placement for patient for continued medical care.   · General surgery consulted on admit for pneumoperitoneum seen on admit CT scan of abdomen and pelvis. General surgery felt likely contained leak and very small bowel perforation related to recent colitis. Due to patient's very poor functional level at baseline and malnutrition felt to be very poor surgical candidate and recommended conservative.  · ID consulted and recommended IV Vancomycin, Cefepime and Flagyl to treat both small perforation and osteomyelitis of pelvic area and to extend abx until 10/22. IR to place tunneled catheter on 9/28 for long term antibiotics as current midline cannot be extended that long in future and bedside PICC team unable to place bedside PICC due to severe arm contractures. IR to coordinate with Anesthesia on 9/28 to place tunneled line. Hold tube feedings at midnight on Sunday, 9/27 for tunneled line on 9/28.   · ID recommends repeat CT scan of abdomen to re-evaluate perforation in 2-3 weeks or sooner if any acute worsening.  · WBC improving with abx but still with fever so will need to monitor closely as with patient's poor baseline function/noncommunicative status clinical assessment of abdominal worsening more difficult to assess.   · Patient with no further diarrhea.

## 2020-10-09 NOTE — ASSESSMENT & PLAN NOTE
Patient with on and off sinus tachycardia throughout hospital stay. CTA of chest showed no PE. EKG with no ischemic changes and patient with normal WBC so doubt infectious cause. Likely just related to some anxiety. Will just monitor for now.

## 2020-10-09 NOTE — SUBJECTIVE & OBJECTIVE
Interval History: Patient with continued on and off sinus tachycardia abut otherwise vital signs remain stable and patient on room air and not in any respiratory distress. Mom at bedside and emphasized importance that Glen have head of bed elevated at all times to prevent an aspiration events as patient has issues handling his own secretions at baseline. Patient is breathing normally and having no active issues handling his secretions. Discussed with patient's mother still trying to find an LTAC facility for patient and he is medically ready for hospital discharge to an LTAC. If cannot find an LTAC then Plan B is home with home health and 24 hour home care but patient failed that plan on last hospital discharge just 2 weeks ago so not ideal plan.     Review of Systems   Unable to perform ROS: Patient nonverbal     Objective:     Vital Signs (Most Recent):  Temp: 99.9 °F (37.7 °C) (10/08/20 2000)  Pulse: 110 (10/08/20 2000)  Resp: 16 (10/08/20 2000)  BP: 115/68 (10/08/20 2000)  SpO2: 97 % (10/08/20 1955) on rrom air Vital Signs (24h Range):  Temp:  [98.1 °F (36.7 °C)-100 °F (37.8 °C)] 99.9 °F (37.7 °C)  Pulse:  [] 110  Resp:  [16-24] 16  SpO2:  [97 %-100 %] 97 %  BP: (110-121)/(68-82) 115/68     Weight: 34 kg (74 lb 15.3 oz)  Body mass index is 16.22 kg/m².    Intake/Output Summary (Last 24 hours) at 10/8/2020 8812  Last data filed at 10/8/2020 0316  Gross per 24 hour   Intake 769 ml   Output --   Net 769 ml      Physical Exam  Vitals signs and nursing note reviewed.   Constitutional:       General: He is not in acute distress.     Appearance: He is well-developed. He is cachectic. He is ill-appearing (Chronic).   Eyes:      Conjunctiva/sclera: Conjunctivae normal.   Neck:      Vascular: No JVD.   Cardiovascular:      Rate and Rhythm: Regular rhythm. Tachycardia present.      Heart sounds: Normal heart sounds. No murmur. No friction rub. No gallop.    Pulmonary:      Effort: Pulmonary effort is normal. No  respiratory distress.      Breath sounds: Normal breath sounds. No wheezing or rhonchi.   Abdominal:      General: Abdomen is flat. Bowel sounds are normal. There is no distension.      Palpations: Abdomen is soft.      Tenderness: There is no abdominal tenderness. There is no guarding.      Comments: PEG tube in place   Skin:     Findings: No erythema or rash.   Psychiatric:         Speech: He is noncommunicative.         Behavior: Behavior is cooperative.         Significant Labs:   CBC:   Recent Labs   Lab 10/07/20  0344 10/08/20  0330   WBC 8.22 7.38   HGB 7.7* 8.1*   HCT 24.7* 26.4*   * 652*     CMP:   Recent Labs   Lab 10/07/20  0344 10/08/20  0330    137   K 3.6 4.1    102   CO2 27 27   * 93   BUN 6 8   CREATININE 0.4* 0.5   CALCIUM 8.1* 8.6*   PROT 5.7* 6.5   ALBUMIN 1.8* 2.0*   BILITOT <0.1* 0.1   ALKPHOS 68 71   AST 17 17   ALT 8* 9*   ANIONGAP 8 8   EGFRNONAA >60.0 >60.0     Magnesium:   Recent Labs   Lab 10/07/20  0344 10/08/20  0330   MG 1.7 1.9       Significant Imaging: I have reviewed all pertinent imaging results/findings within the past 24 hours.

## 2020-10-09 NOTE — PROGRESS NOTES
[facility] Hospital Medicine  Progress Note    Patient Name: Glen Moscoso  MRN: [id number]  Patient Class: IP- Inpatient   Admission Date: 9/20/2020  Length of Stay: 18 days  Attending Physician: Nohemi Farris MD  Primary Care Provider: Yadi Lawson MD    Hospital Medicine Team: [facility] [place] HOSP MED K Nohemi Farris MD    Subjective:     Principal Problem:Acute hypoxemic respiratory failure        HPI:  Mr. Glen Moscoso is a 24 y.o. male with CP, complicated by seizures and a peg tube, who presents to the ER for evaluation of fever.  He just had a prolonged and complex hospital stay at [facility] [place] [facility] from 8/26-9/18 for seizures and pneumonia.  He was originally admitted to [facility], where he was found to have Pseudomonas and Serratia pneumonia, as well as a decubitus ulcer that was debrided by Gen Surg on 9/7 - cultures were not sent.  He was discharged home 9/18 on Zosyn via LUE PICC until 9/24. Family at bedside reports he was doing well until the day of admission when he started to have fevers up to 101.2F. They deny any new respiratory complaints.  Given his recent hospitalization and fever while on antibiotics, they brought him to the ER for further evaluation.     Upon arrival to the ER, vitals were temp 101.2F, , RR 24 and /75.  Labs showed WBC 17 with ESR 90 and Procal 0.56.  CXR didn't show a new consolidation.  His case was discussed with ID, who suggested Vanc, Cefepime, and Flagyl based on his previous cultures, as well as CT chest/abdomen/pelvis to include sacral wounds. He was admitted to Hospital Medicine for further management.    Overview/Hospital Course:  CT scan of abdomen and pelvis done on admit showed overnight small amount of free air and general surgery consulted and evaluated patient who recommended conservative management with antibiotics and close monitoring as felt high surgical risk and likely contained small colonic perforation. ID consulted and patient placed on IV  Cefepime/Flagyl and Vancomycin. G tube study done and no air leak noted. Patient on long term abx as outpatient for infected bilateral Stage IV ischial ulcers and recent bout of Serratia and Pseudomonal pneumonia. Antibiotics and wound care for ulcers continued on this admit. Patient with severe cerebral palsy and bed bound/wheelchair bound at baseline. Blood cultures from admit are no growth to date. Patient with no further fever since admit on 9/24 and WBC improving. Plan to continue broad spectrum abx and conservative care for small colonic perforation. Patient back on home TF and tolerating. ID recommending to extend current antibiotic regimen to treat bowel perforation and pelvic osteomyelitis until 10/22. Patient only with midline and needs PICC line for longer term antibiotic treatment. PICC line team consulted but stated due to patient's severe arm contractures PICC cannot be placed. PICC team recommended to consult IR for tunneled catheter amd IIR consulted on 9/25 and plan to place line on 9/28 with assistance of anesthesia. IR states to hold tube feedings at midnight on Sunday for line placement on 9/28. Patient had tunneled line placed by IR to right chest wall on 9/28. Patient had issues after line placed with secretions and became hypoxic but aggressively suctioned and improved and patient much improved and back off oxygen on 9/29. Patient was awaiting LTAC placement when he had an acute event on on 10/3 with increased oxygen requirements and transferred to ICU as was up to 15 liters of oxygen. Suspected aspiration event and patient aggressively suctioned. Sputum grew out Serratia and patient had previously grown out on prior cultures but ID felt patient likely a chronic colonizer and already on antibiotics for treatment. Patient stepped back down to floor on 10/4 and when he stepped back down was weaned all the way back to room air. CTA of chest done on 10/6 and showed no PE. Lower extremity venous  ultrasound negative for PE. Patient back to his normal baseline except for intermittent tachycardia and repeat infectious work-up unremarkable. Patient medically ready for discharge and awaiting LTAC placement but CM/SW having difficulty finding an LTAC facility.     Interval History: Patient with continued on and off sinus tachycardia abut otherwise vital signs remain stable and patient on room air and not in any respiratory distress. Mom at bedside and emphasized importance that Glen have head of bed elevated at all times to prevent an aspiration events as patient has issues handling his own secretions at baseline. Patient is breathing normally and having no active issues handling his secretions. Discussed with patient's mother still trying to find an LTAC facility for patient and he is medically ready for hospital discharge to an LTAC. If cannot find an LTAC then Plan B is home with home health and 24 hour home care but patient failed that plan on last hospital discharge just 2 weeks ago so not ideal plan.     Review of Systems   Unable to perform ROS: Patient nonverbal     Objective:     Vital Signs (Most Recent):  Temp: 99.9 °F (37.7 °C) (10/08/20 2000)  Pulse: 110 (10/08/20 2000)  Resp: 16 (10/08/20 2000)  BP: 115/68 (10/08/20 2000)  SpO2: 97 % (10/08/20 1955) on rrom air Vital Signs (24h Range):  Temp:  [98.1 °F (36.7 °C)-100 °F (37.8 °C)] 99.9 °F (37.7 °C)  Pulse:  [] 110  Resp:  [16-24] 16  SpO2:  [97 %-100 %] 97 %  BP: (110-121)/(68-82) 115/68     Weight: 34 kg (74 lb 15.3 oz)  Body mass index is 16.22 kg/m².    Intake/Output Summary (Last 24 hours) at 10/8/2020 2142  Last data filed at 10/8/2020 0316  Gross per 24 hour   Intake 769 ml   Output --   Net 769 ml      Physical Exam  Vitals signs and nursing note reviewed.   Constitutional:       General: He is not in acute distress.     Appearance: He is well-developed. He is cachectic. He is ill-appearing (Chronic).   Eyes:      Conjunctiva/sclera:  Conjunctivae normal.   Neck:      Vascular: No JVD.   Cardiovascular:      Rate and Rhythm: Regular rhythm. Tachycardia present.      Heart sounds: Normal heart sounds. No murmur. No friction rub. No gallop.    Pulmonary:      Effort: Pulmonary effort is normal. No respiratory distress.      Breath sounds: Normal breath sounds. No wheezing or rhonchi.   Abdominal:      General: Abdomen is flat. Bowel sounds are normal. There is no distension.      Palpations: Abdomen is soft.      Tenderness: There is no abdominal tenderness. There is no guarding.      Comments: PEG tube in place   Skin:     Findings: No erythema or rash.   Psychiatric:         Speech: He is noncommunicative.         Behavior: Behavior is cooperative.         Significant Labs:   CBC:   Recent Labs   Lab 10/07/20  0344 10/08/20  0330   WBC 8.22 7.38   HGB 7.7* 8.1*   HCT 24.7* 26.4*   * 652*     CMP:   Recent Labs   Lab 10/07/20  0344 10/08/20  0330    137   K 3.6 4.1    102   CO2 27 27   * 93   BUN 6 8   CREATININE 0.4* 0.5   CALCIUM 8.1* 8.6*   PROT 5.7* 6.5   ALBUMIN 1.8* 2.0*   BILITOT <0.1* 0.1   ALKPHOS 68 71   AST 17 17   ALT 8* 9*   ANIONGAP 8 8   EGFRNONAA >60.0 >60.0     Magnesium:   Recent Labs   Lab 10/07/20  0344 10/08/20  0330   MG 1.7 1.9       Significant Imaging: I have reviewed all pertinent imaging results/findings within the past 24 hours.      Assessment/Plan:      Sepsis  Pneumoperitoneum  Colitis  · Sepsis resolved and pneumoperitoneum resolved. No acute abdominal signs on exam.   · Patient was afebrile with stable WBC with resolution of sepsis until patient had temp of 100.9 F and slight increase in WBC to 21,800 today, 9/30. Patient's abdominal exam overall is largely unchanged and lungs are clear but patient has known colon issue and concern due to his noncommunicating state making a good exam difficult. Repeat CT scan done and showed improvement in colitis and resolution of pneumoperitoneum.  "  · LTAC and CM/SW working on placement for patient for continued medical care.   · General surgery consulted on admit for pneumoperitoneum seen on admit CT scan of abdomen and pelvis. General surgery felt likely contained leak and very small bowel perforation related to recent colitis. Due to patient's very poor functional level at baseline and malnutrition felt to be very poor surgical candidate and recommended conservative.  · ID consulted and recommended IV Vancomycin, Cefepime and Flagyl to treat both small perforation and osteomyelitis of pelvic area and to extend abx until 10/22. IR to place tunneled catheter on 9/28 for long term antibiotics as current midline cannot be extended that long in future and bedside PICC team unable to place bedside PICC due to severe arm contractures. IR to coordinate with Anesthesia on 9/28 to place tunneled line. Hold tube feedings at midnight on Sunday, 9/27 for tunneled line on 9/28.   · ID recommends repeat CT scan of abdomen to re-evaluate perforation in 2-3 weeks or sooner if any acute worsening.  · WBC improving with abx but still with fever so will need to monitor closely as with patient's poor baseline function/noncommunicative status clinical assessment of abdominal worsening more difficult to assess.   · Patient with no further diarrhea.       Decubitus ulcer of ischial area, left, stage IV  Pressure injury of right ischium, stage 4  Osteomyelitis of pelvis  · Controlled. Wounds are improving. Continue current wound care.  · Wound care consulted and managing and wound care and preventive measures as per wound care recommendations. Wounds appear clean with no definite signs of infection.   · As per wound care "Nursing to cleanse scrotal skin breakdown and R and L ischial pressure injuries with wound cleanser. Pack ischial pressure injuries with Triad ointment and gauze, then cover bordered foam dressing to prove autolytic debridement and prevent breakdown of intact " "skin. Apply Triad ointment over scrotal skin breakdown."  · Patient now with exposed bone to ischial areas and ID with concern for underlying osteomyelitis even though wounds themselves do not appear infected so recommend extension of long term abx with IV Vancomycin, Cefepime and po Flagyl until at least 10/22. Will need PICC line as midline about ready to  as per ID recs and IR placed tunneled line on  for long term antibiotics to right chest wall.     Severe protein-calorie malnutrition  · Present on admit and related to colitis, recent medical illnesses. Patient with PEG and receiving TF with Prebio to help with nutrition. Likely cause of poor wound healing and development of ischial ulcers.   · Nutrition consulted and following and continue Peptamen with Prebio at 40 mL/hr as per nutrition recs to treat malnutrition.       Seizure disorder  Chronic and controlled and continue Phenobarbital to treat.       Anemia of chronic disease  Chronic and controlled and related to chronic infection, poor nutrition and poor baseline function status. Monitor anemia with daily CBC and transfuse if Hgb < 7.       Hypophosphatemia  · Phosphorus level stabilized and monitoring and replacing prn.   · Slight decrease on  to 2.3.   · Resolved on  and level 4.1.   · Improved level at 3.0 on  and stable at 2.4 on  and 2.8 on .   · Phosphorus level 1.5 on  consistent with hypophosphatemia and treated with Sodium Phosphate 20 mmol IV for 1 dose(s).  · Monitor daily Phosphorus level and replace prn.     Cerebral palsy  Chronic condition. Patient with poor baseline functional status and non-communicative and bed bound/wheelchair bound with extremity contractures to all extremities at baseline.       Sinus tachycardia  Patient with on and off sinus tachycardia throughout hospital stay. CTA of chest showed no PE. EKG with no ischemic changes and patient with normal WBC so doubt infectious cause. Likely " just related to some anxiety. Will just monitor for now.         VTE Risk Mitigation (From admission, onward)         Ordered     heparin (porcine) injection 5,000 Units  Every 12 hours      10/04/20 1426     IP VTE HIGH RISK PATIENT  Once      09/28/20 1211     Place sequential compression device  Until discontinued      09/28/20 1211                Discharge Planning   ROCÍO: 10/9/2020     Code Status: Full Code   Is the patient medically ready for discharge?: Yes    Reason for patient still in hospital (select all that apply): Other (specify) Patietn medically ready and awaiitng LTAC placement.   Discharge Plan A: Long-term acute care facility (LTAC)   Discharge Delays: (!) Other(LTACH planning in progress)        Nohemi Farris MD  Department of Hospital Medicine   [facility]

## 2020-10-09 NOTE — ASSESSMENT & PLAN NOTE
· Phosphorus level stabilized and monitoring and replacing prn.   · Slight decrease on 9/30 to 2.3.   · Resolved on 9/29 and level 4.1.   · Improved level at 3.0 on 9/26 and stable at 2.4 on 9/27 and 2.8 on 9/28.   · Phosphorus level 1.5 on 9/25 consistent with hypophosphatemia and treated with Sodium Phosphate 20 mmol IV for 1 dose(s).  · Monitor daily Phosphorus level and replace prn.

## 2020-10-10 PROBLEM — E83.39 HYPOPHOSPHATEMIA: Status: RESOLVED | Noted: 2020-08-30 | Resolved: 2020-10-10

## 2020-10-10 PROBLEM — A41.9 SEPSIS: Status: RESOLVED | Noted: 2020-09-20 | Resolved: 2020-10-10

## 2020-10-10 NOTE — ASSESSMENT & PLAN NOTE
"Pressure injury of right ischium, stage 4  Osteomyelitis of pelvis  · Improving on discharge to [facility]. Wounds are improving as per last wound care note. Continue current wound care on discharge from hospital to [facility].  · Wound care consulted and managing and wound care and preventive measures as per wound care recommendations. Wounds appear clean with no definite signs of infection.   · As per wound care "Nursing to cleanse scrotal skin breakdown and R and L ischial pressure injuries with wound cleanser. Pack ischial pressure injuries with Triad ointment and gauze, then cover bordered foam dressing to prove autolytic debridement and prevent breakdown of intact skin. Apply Triad ointment over scrotal skin breakdown."  · Off loading mattress and frequent turning to help with wound healing o discharge.   · Patient now with exposed bone to ischial areas and ID with concern for underlying osteomyelitis even though wounds themselves do not appear infected so recommend extension of long term abx with IV Vancomycin, Cefepime and po Flagyl until at least 10/22. IR placed tunneled line on 9/28 for long term antibiotics to right chest wall and patient discharged with this line in place to [facility].   "

## 2020-10-10 NOTE — DISCHARGE SUMMARY
[facility] Hospital Medicine  Discharge Summary      Patient Name: Glen Moscoso  MRN: [id number]  Admission Date: 9/20/2020  Hospital Length of Stay: 19 days  Discharge Date and Time: 10/9/2020  1:23 PM  Attending Physician: Nohemi Farris MD   Discharging Provider: Nohemi Farris MD  Primary Care Provider: Yadi Lawson MD  Hospital Medicine Team[place] [facility]  Nohemi Farris MD    HPI:   Mr. Glen Moscoso is a 24 y.o. male with CP, complicated by seizures and a peg tube, who presents to the ER for evaluation of fever.  He just had a prolonged and complex [place] [facility] [place] for seizures and pneumonia.  He was originally admitted to [facility], where he was found to have Pseudomonas and Serratia pneumonia, as well as a decubitus ulcer that was debrided by Gen Surg on 9/7 - cultures were not sent.  He was discharged home 9/18 on Zosyn via LUE PICC until 9/24. Family at bedside reports he was doing well until the day of admission when he started to have fevers up to 101.2F. They deny any new respiratory complaints.  Given his recent hospitalization and fever while on antibiotics, they brought him to the ER for further evaluation.     Upon arrival to the ER, vitals were temp 101.2F, , RR 24 and /75.  Labs showed WBC 17 with ESR 90 and Procal 0.56.  CXR didn't show a new consolidation.  His case was discussed with ID, who suggested Vanc, Cefepime, and Flagyl based on his previous cultures, as well as CT chest/abdomen/pelvis to include sacral wounds. He was admitted to Hospital Medicine for further management.    9/28/2020  Procedure(s) (LRB):  Insertion,catheter,tunneled (Right)    Surgeon(s):  Maryam Surgeon      Hospital Course:   CT scan of abdomen and pelvis done on admit showed overnight small amount of free air and general surgery consulted and evaluated patient who recommended conservative management with antibiotics and close monitoring as felt high  surgical risk and likely contained small colonic perforation. ID consulted and patient placed on IV Cefepime/Flagyl and Vancomycin. G tube study done and no air leak noted. Patient on long term abx as outpatient for infected bilateral Stage IV ischial ulcers and recent bout of Serratia and Pseudomonal pneumonia. Antibiotics and wound care for ulcers continued on this admit. Patient with severe cerebral palsy and bed bound/wheelchair bound at baseline. Blood cultures from admit are no growth to date. Patient with no further fever since admit on 9/24 and WBC improving. Plan to continue broad spectrum abx and conservative care for small colonic perforation. Patient back on home TF and tolerating. ID recommending to extend current antibiotic regimen to treat bowel perforation and pelvic osteomyelitis until 10/22. Patient only with midline and needs PICC line for longer term antibiotic treatment. PICC line team consulted but stated due to patient's severe arm contractures PICC cannot be placed. PICC team recommended to consult IR for tunneled catheter amd IIR consulted on 9/25 and plan to place line on 9/28 with assistance of anesthesia. IR states to hold tube feedings at midnight on Sunday for line placement on 9/28. Patient had tunneled line placed by IR to right chest wall on 9/28. Patient had issues after line placed with secretions and became hypoxic but aggressively suctioned and improved and patient much improved and back off oxygen on 9/29. Patient was awaiting LTAC placement when he had an acute event on on 10/3 with increased oxygen requirements and transferred to ICU as was up to 15 liters of oxygen. Suspected aspiration event and patient aggressively suctioned. Sputum grew out Serratia and patient had previously grown out on prior cultures but ID felt patient likely a chronic colonizer and already on antibiotics for treatment. Patient stepped back down to floor on 10/4 and when he stepped back down was weaned  all the way back to room air. CTA of chest done on 10/6 and showed no PE. Lower extremity venous ultrasound negative for PE. Patient back to his normal baseline except for intermittent tachycardia and repeat infectious work-up unremarkable. Patient medically ready for discharge and awaiting LTAC placement. Patient accepted and discharged to [facility] on 10/9/2020. Patient's family aware of discharge as mother at bedside at time of discharge. Patient remains medically stable and off all oxygen at time of discharge with no respiratory difficulty and afebrile. Patient discharged on IV Vancomycin, Cefepime and Flagyl to treat pelvic osteomyelitis and resolving colitis. Patient to have continued wound care at [facility] for his ischial wounds that are improving. Patient continued on Peptamen TF via his PEG on discharge. Patient has scheduled follow-up in IF clinic on 10/22/2020 but if still at [facility] will nee to be moved to a later date after discharge from LTAC.      Consults:   Consults (From admission, onward)        Status Ordering Provider     Inpatient consult to General Surgery  Once     Provider:  (Not yet assigned)    Completed KATHY WELDON     Inpatient consult to Infectious Diseases  Once     Provider:  (Not yet assigned)    Completed KATHY WELDON     Inpatient consult to Infectious Diseases  Once     Provider:  (Not yet assigned)    Completed BRENDA ROJAS     Inpatient consult to PICC team ([facility])  Once     Provider:  (Not yet assigned)    Completed LALITO HUNTER     Inpatient consult to Registered Dietitian/Nutritionist  Once     Provider:  (Not yet assigned)    Completed KATHY WELDON     Inpatient consult to Social Work  Once     Provider:  (Not yet assigned)    Completed MATTHIAS ELIZABETH          * Sepsis  Pneumoperitoneum  Colitis  · Sepsis resolved and pneumoperitoneum resolved. No acute abdominal signs on exam. Patient to cotineu long term abx as per ID until 10/22 and will need  repeat CT imaging of abdomen prior to stopping antibiotics.   · Patient was afebrile with stable WBC with resolution of sepsis until patient had temp of 100.9 F and slight increase in WBC to 21,800 today, 9/30. Patient's abdominal exam overall is largely unchanged and lungs are clear but patient has known colon issue and concern due to his noncommunicating state making a good exam difficult. Repeat CT scan done and showed improvement in colitis and resolution of pneumoperitoneum.   · LTAC and CM/SW working on placement for patient for continued medical care.   · General surgery consulted on admit for pneumoperitoneum seen on admit CT scan of abdomen and pelvis. General surgery felt likely contained leak and very small bowel perforation related to recent colitis. Due to patient's very poor functional level at baseline and malnutrition felt to be very poor surgical candidate and recommended conservative.  · ID consulted and recommended IV Vancomycin, Cefepime and Flagyl to treat both small perforation and osteomyelitis of pelvic area and to extend abx until 10/22. IR to place tunneled catheter on 9/28 for long term antibiotics as current midline cannot be extended that long in future and bedside PICC team unable to place bedside PICC due to severe arm contractures. IR to coordinate with Anesthesia on 9/28 to place tunneled line. Hold tube feedings at midnight on Sunday, 9/27 for tunneled line on 9/28.   · ID recommends repeat CT scan of abdomen to re-evaluate perforation in 2-3 weeks or sooner if any acute worsening.  · WBC improving with abx but still with fever so will need to monitor closely as with patient's poor baseline function/noncommunicative status clinical assessment of abdominal worsening more difficult to assess.   · Patient with no further diarrhea.       Decubitus ulcer of ischial area, left, stage IV  Pressure injury of right ischium, stage 4  Osteomyelitis of pelvis  · Improving on discharge to [place]  "LTAC. Wounds are improving as per last wound care note. Continue current wound care on discharge from hospital to LTAC.  · Wound care consulted and managing and wound care and preventive measures as per wound care recommendations. Wounds appear clean with no definite signs of infection.   · As per wound care "Nursing to cleanse scrotal skin breakdown and R and L ischial pressure injuries with wound cleanser. Pack ischial pressure injuries with Triad ointment and gauze, then cover bordered foam dressing to prove autolytic debridement and prevent breakdown of intact skin. Apply Triad ointment over scrotal skin breakdown."  · Off loading mattress and frequent turning to help with wound healing o discharge.   · Patient now with exposed bone to ischial areas and ID with concern for underlying osteomyelitis even though wounds themselves do not appear infected so recommend extension of long term abx with IV Vancomycin, Cefepime and po Flagyl until at least 10/22. IR placed tunneled line on 9/28 for long term antibiotics to right chest wall and patient discharged with this line in place to [facility].     Severe protein-calorie malnutrition  · Present on admit and related to colitis, recent medical illnesses. Patient with PEG and receiving TF with Prebio to help with nutrition. Likely cause of poor wound healing and development of ischial ulcers.   · Nutrition consulted and following and continue Peptamen with Prebio at 40 mL/hr as per nutrition recs to treat malnutrition and patient discharged on this regimen to LTAC.       Seizure disorder  Chronic and controlled and continue Phenobarbital to treat on discharge.      Anemia of chronic disease  Chronic and controlled and related to chronic infection, poor nutrition and poor baseline function status.       Hypophosphatemia-resolved as of 10/10/2020  · Phosphorus level stabilized and monitoring and replacing prn.   · Slight decrease on 9/30 to 2.3.   · Resolved on 9/29 and " level 4.1.   · Improved level at 3.0 on 9/26 and stable at 2.4 on 9/27 and 2.8 on 9/28.   · Phosphorus level 1.5 on 9/25 consistent with hypophosphatemia and treated with Sodium Phosphate 20 mmol IV for 1 dose(s).  · Monitor daily Phosphorus level and replace prn.     Cerebral palsy  Chronic condition. Patient with poor baseline functional status and non-communicative and bed bound/wheelchair bound with extremity contractures to all extremities at baseline.       Sinus tachycardia  Patient with on and off sinus tachycardia throughout hospital stay. CTA of chest showed no PE. EKG with no ischemic changes and patient with normal WBC so doubt infectious cause. Likely just related to some anxiety and chronic inflammatory state. Patient on Propranolol and continued on hospital discharge.       Acute hypoxemic respiratory failure-resolved as of 10/8/2020  · Patient had several acute hypoxic events including one on 9/28 after tunneled lie placed and another on 10/3 requiring high flow oxygen but no intubation and short 1 ay ICU stay. Both events felt related to aspiration as patient is a chronic aspirator and both resolved with cough assist device and aggressive nasotracheal suctioning.   · Patient did have sputum that is persistently positive with Serratia but ID consulted and feels at this point patient is colonized and no specific treatment needed for positive sputum culture.from 10/3.   · Patient weaned off oxygen prior to discharge and has been off oxygen since iCU stepdown on 10/4.  · Patient needs strict aspiration precautions on discharge with head of bed elevated at all times at least 30 degrees and suctioning available at all times. All meds ad nutrition via his PEG tube.       Final Active Diagnoses:    Diagnosis Date Noted POA    Sepsis [A41.9] 09/20/2020 Yes    Decubitus ulcer of ischial area, left, stage IV [L89.324] 09/10/2020 Yes    Severe protein-calorie malnutrition [E43]  Yes    Seizure disorder  [G40.909] 08/26/2020 Yes    Anemia of chronic disease [D63.8] 08/30/2020 Yes    Hypophosphatemia [E83.39] 08/30/2020 Yes    Cerebral palsy [G80.9] 08/04/2020 Yes    Osteomyelitis of pelvis [M86.9] 09/24/2020 Yes    Colitis [K52.9]  Yes    Pneumoperitoneum [K66.8] 09/21/2020 Yes    Pressure injury of right ischium, stage 4 [L89.314]  Yes    Sinus tachycardia [R00.0] 08/04/2020 No      Problems Resolved During this Admission:    Diagnosis Date Noted Date Resolved POA    PRINCIPAL PROBLEM:  Acute hypoxemic respiratory failure [J96.01] 10/03/2020 10/08/2020 Yes    Fever [R50.9] 09/03/2020 10/08/2020 Yes    Pneumonia due to Pseudomonas aerginuosa and Serratia marcesens [J15.1] 08/04/2020 09/24/2020 Yes    Moderate malnutrition [E44.0] 08/27/2020 09/24/2020 Yes       Discharged Condition: good    Disposition: Long Term Acute Care ([facility])    Follow Up:     Future Appointments   Date Time Provider Department Center   10/22/2020 11:30 AM [facility] [address]       Patient Instructions:      Diet NPO     Call MD for:  temperature >100.4     Call MD for:  persistent nausea and vomiting or diarrhea     Call MD for:  severe uncontrolled pain     Call MD for:  redness, tenderness, or signs of infection (pain, swelling, redness, odor or green/yellow discharge around incision site)     Call MD for:  difficulty breathing or increased cough     Call MD for:  severe persistent headache     Call MD for:  worsening rash     Call MD for:  persistent dizziness, light-headedness, or visual disturbances     Call MD for:  increased confusion or weakness     Tube Feedings/Formulas     Order Specific Question Answer Comments   Select Adult Formula: Peptamen 1.5 Prebio    Route: Gastrostomy    Formula Rate (mL/hr): 40      Activity as tolerated       Significant Diagnostic Studies: Labs:   CMP   Recent Labs   Lab 10/09/20  0445      K 3.8      CO2 26   *   BUN 9   CREATININE 0.5    CALCIUM 8.5*   PROT 6.4   ALBUMIN 2.0*   BILITOT 0.1   ALKPHOS 70   AST 15   ALT 7*   ANIONGAP 9   ESTGFRAFRICA >60.0   EGFRNONAA >60.0    and CBC   Recent Labs   Lab 10/09/20  0445   WBC 10.25   HGB 8.1*   HCT 26.8*   *     Blood Culture, Routine   Date Value Ref Range Status   10/04/2020 No growth after 5 days.  Final   10/03/2020 No growth after 5 days.  Final     Respiratory Culture   Date Value Ref Range Status   10/03/2020 No S aureus or Pseudomonas isolated.  Final   10/03/2020 SERRATIA MARCESCENS  Few   (A)  Final       Pending Diagnostic Studies:     None         Medications:  Reconciled Home Medications:      Medication List      START taking these medications    acetaminophen 325 MG tablet  Commonly known as: TYLENOL  2 tablets (650 mg total) by Per G Tube route every 4 (four) hours as needed (subjective signs of pain).     albuterol-ipratropium 2.5 mg-0.5 mg/3 mL nebulizer solution  Commonly known as: DUO-NEB  Take 3 mLs by nebulization every 4 (four) hours as needed for Wheezing or Shortness of Breath. Rescue     bisacodyL 10 mg Supp  Commonly known as: DULCOLAX  Place 1 suppository (10 mg total) rectally daily as needed (Until bowel movement if patient has no bowel movement for 2 days).     ceFEPIme 2 gram injection  Commonly known as: MAXIPIME  Inject 2 g into the vein every 8 (eight) hours. End date 10/22/2020. for 13 days     melatonin 3 mg tablet  Commonly known as: MELATIN  2 tablets (6 mg total) by Per G Tube route nightly as needed for Insomnia.     metroNIDAZOLE 500 MG tablet  Commonly known as: FLAGYL  1 tablet (500 mg total) by Per G Tube route 3 (three) times daily. End date 10/22/2020. for 13 days     VANCOMYCIN 750 MG/250 ML D5W (READY TO MIX SYSTEM)  Inject 250 mLs (750 mg total) into the vein every 12 (twelve) hours. End date 10/22/2020 for 13 days        CHANGE how you take these medications    PHENobarbitaL 20 mg/5 mL (4 mg/mL) Elix elixir  25 mLs (100 mg total) by Per G Tube  route every evening.  What changed: how to take this        CONTINUE taking these medications    ascorbic acid (vitamin C) 500 MG tablet  Commonly known as: VITAMIN C  1 tablet (500 mg total) by Per G Tube route once daily.     baclofen 10 MG tablet  Commonly known as: LIORESAL  1 tablet (10 mg total) by Per G Tube route 3 (three) times daily.     diphenhydrAMINE 12.5 mg/5 mL elixir  Commonly known as: BENADRYL  10 mLs (25 mg total) by Per G Tube route 4 (four) times daily as needed for Allergies.     gabapentin 250 mg/5 mL solution  Commonly known as: NEURONTIN  5 mLs (250 mg total) by Per G Tube route nightly. At bedtime     Lactobacillus rhamnosus GG 10 billion cell capsule  Commonly known as: CULTURELLE  1 capsule by Per G Tube route once daily.     loperamide 1 mg/7.5 mL solution  Commonly known as: IMODIUM  15 mLs (2 mg total) by Per G Tube route 4 (four) times daily. for 10 days     multivitamin liquid no.118 Liqd  10 mLs by Per G Tube route once daily.     propranoloL 20 MG tablet  Commonly known as: INDERAL  1 tablet (20 mg total) by Per G Tube route 3 (three) times daily.        STOP taking these medications    cholestyramine 4 gram packet  Commonly known as: QUESTRAN     ibuprofen 100 mg/5 mL suspension  Commonly known as: ADVIL,MOTRIN     nystatin cream  Commonly known as: MYCOSTATIN     PIPERACILLIN-TAZOBACTAM 4.5G/100ML SODIUM CHLORIDE 0.9%-READY TO MIX            Indwelling Lines/Drains at time of discharge:   Lines/Drains/Airways     Central Venous Catheter Line            Permacath 09/28/20 12 days          Drain                 Gastrostomy/Enterostomy 08/04/20 1653 Percutaneous endoscopic gastrostomy (PEG) feeding 66 days                Time spent on the discharge of patient: 36 minutes  Patient was seen and examined on the date of discharge and determined to be suitable for discharge.         Nohemi Farris MD  Department of Hospital Medicine  [facility]

## 2020-10-10 NOTE — ASSESSMENT & PLAN NOTE
Patient with on and off sinus tachycardia throughout hospital stay. CTA of chest showed no PE. EKG with no ischemic changes and patient with normal WBC so doubt infectious cause. Likely just related to some anxiety and chronic inflammatory state. Patient on Propranolol and continued on hospital discharge.

## 2020-10-10 NOTE — ASSESSMENT & PLAN NOTE
· Phosphorus level stabilized and monitoring and replacing prn.   · Slight decrease on 9/30 to 2.3.   · Resolved on 9/29 and level 4.1.   · Improved level at 3.0 on 9/26 and stable at 2.4 on 9/27 and 2.8 on 9/28.   · Phosphorus level 1.5 on 9/25 consistent with hypophosphatemia and treated with Sodium Phosphate 20 mmol IV for 1 dose(s).  · Monitor daily Phosphorus level and replace prn.    Detail Level: Zone Continue Regimen: Aczone qam and Epiduo forte QHS Initiate Treatment: Panoxyl 10 % on back

## 2020-10-10 NOTE — ASSESSMENT & PLAN NOTE
· Patient had several acute hypoxic events including one on 9/28 after tunneled lie placed and another on 10/3 requiring high flow oxygen but no intubation and short 1 ay ICU stay. Both events felt related to aspiration as patient is a chronic aspirator and both resolved with cough assist device and aggressive nasotracheal suctioning.   · Patient did have sputum that is persistently positive with Serratia but ID consulted and feels at this point patient is colonized and no specific treatment needed for positive sputum culture.from 10/3.   · Patient weaned off oxygen prior to discharge and has been off oxygen since iCU stepdown on 10/4.  · Patient needs strict aspiration precautions on discharge with head of bed elevated at all times at least 30 degrees and suctioning available at all times. All meds ad nutrition via his PEG tube.

## 2020-10-10 NOTE — ASSESSMENT & PLAN NOTE
· Present on admit and related to colitis, recent medical illnesses. Patient with PEG and receiving TF with Prebio to help with nutrition. Likely cause of poor wound healing and development of ischial ulcers.   · Nutrition consulted and following and continue Peptamen with Prebio at 40 mL/hr as per nutrition recs to treat malnutrition and patient discharged on this regimen to LTAC.

## 2020-10-10 NOTE — ASSESSMENT & PLAN NOTE
Pneumoperitoneum  Colitis  · Sepsis resolved and pneumoperitoneum resolved. No acute abdominal signs on exam. Patient to cotineu long term abx as per ID until 10/22 and will need repeat CT imaging of abdomen prior to stopping antibiotics.   · Patient was afebrile with stable WBC with resolution of sepsis until patient had temp of 100.9 F and slight increase in WBC to 21,800 today, 9/30. Patient's abdominal exam overall is largely unchanged and lungs are clear but patient has known colon issue and concern due to his noncommunicating state making a good exam difficult. Repeat CT scan done and showed improvement in colitis and resolution of pneumoperitoneum.   · LTAC and CM/SW working on placement for patient for continued medical care.   · General surgery consulted on admit for pneumoperitoneum seen on admit CT scan of abdomen and pelvis. General surgery felt likely contained leak and very small bowel perforation related to recent colitis. Due to patient's very poor functional level at baseline and malnutrition felt to be very poor surgical candidate and recommended conservative.  · ID consulted and recommended IV Vancomycin, Cefepime and Flagyl to treat both small perforation and osteomyelitis of pelvic area and to extend abx until 10/22. IR to place tunneled catheter on 9/28 for long term antibiotics as current midline cannot be extended that long in future and bedside PICC team unable to place bedside PICC due to severe arm contractures. IR to coordinate with Anesthesia on 9/28 to place tunneled line. Hold tube feedings at midnight on Sunday, 9/27 for tunneled line on 9/28.   · ID recommends repeat CT scan of abdomen to re-evaluate perforation in 2-3 weeks or sooner if any acute worsening.  · WBC improving with abx but still with fever so will need to monitor closely as with patient's poor baseline function/noncommunicative status clinical assessment of abdominal worsening more difficult to assess.   · Patient with  no further diarrhea.

## 2020-10-10 NOTE — ASSESSMENT & PLAN NOTE
Chronic and controlled and related to chronic infection, poor nutrition and poor baseline function status.

## 2020-10-16 ENCOUNTER — TELEPHONE (OUTPATIENT)
Dept: INFECTIOUS DISEASES | Facility: CLINIC | Age: 25
End: 2020-10-16

## 2020-10-16 NOTE — TELEPHONE ENCOUNTER
Called [facility] at [phone], spoke to Barbara Ramirez will manage pts IV ABX and labs until EOC

## 2020-10-22 ENCOUNTER — HOSPITAL ENCOUNTER (INPATIENT)
Facility: HOSPITAL | Age: 25
LOS: 12 days | Discharge: NURSING, REHAB OR LONG-TERM CARE | DRG: 003 | End: 2020-11-03
Attending: EMERGENCY MEDICINE | Admitting: INTERNAL MEDICINE
Payer: MEDICAID

## 2020-10-22 ENCOUNTER — ANESTHESIA (OUTPATIENT)
Dept: EMERGENCY MEDICINE | Facility: HOSPITAL | Age: 25
DRG: 003 | End: 2020-10-22
Payer: MEDICAID

## 2020-10-22 ENCOUNTER — ANESTHESIA EVENT (OUTPATIENT)
Dept: EMERGENCY MEDICINE | Facility: HOSPITAL | Age: 25
DRG: 003 | End: 2020-10-22
Payer: MEDICAID

## 2020-10-22 DIAGNOSIS — L89.329 PRESSURE INJURY OF SKIN OF LEFT BUTTOCK, UNSPECIFIED INJURY STAGE: ICD-10-CM

## 2020-10-22 DIAGNOSIS — J69.0 ASPIRATION PNEUMONIA OF RIGHT LOWER LOBE, UNSPECIFIED ASPIRATION PNEUMONIA TYPE: ICD-10-CM

## 2020-10-22 DIAGNOSIS — E43 SEVERE PROTEIN-CALORIE MALNUTRITION: Chronic | ICD-10-CM

## 2020-10-22 DIAGNOSIS — A41.9 SEPSIS, DUE TO UNSPECIFIED ORGANISM, UNSPECIFIED WHETHER ACUTE ORGAN DYSFUNCTION PRESENT: ICD-10-CM

## 2020-10-22 DIAGNOSIS — R00.0 TACHYCARDIA: ICD-10-CM

## 2020-10-22 DIAGNOSIS — J96.01 ACUTE HYPOXEMIC RESPIRATORY FAILURE: ICD-10-CM

## 2020-10-22 DIAGNOSIS — R79.89 ELEVATED LACTIC ACID LEVEL: ICD-10-CM

## 2020-10-22 DIAGNOSIS — Z01.818 ENCOUNTER FOR INTUBATION: ICD-10-CM

## 2020-10-22 DIAGNOSIS — M86.9 OSTEOMYELITIS OF PELVIS: Chronic | ICD-10-CM

## 2020-10-22 DIAGNOSIS — D64.9 ANEMIA, UNSPECIFIED TYPE: ICD-10-CM

## 2020-10-22 DIAGNOSIS — G80.9 CEREBRAL PALSY, UNSPECIFIED TYPE: Chronic | ICD-10-CM

## 2020-10-22 DIAGNOSIS — L89.324 DECUBITUS ULCER OF ISCHIAL AREA, LEFT, STAGE IV: Chronic | ICD-10-CM

## 2020-10-22 DIAGNOSIS — Z93.0 STATUS POST TRACHEOSTOMY: ICD-10-CM

## 2020-10-22 DIAGNOSIS — R06.03 RESPIRATORY DISTRESS: Primary | ICD-10-CM

## 2020-10-22 DIAGNOSIS — G80.0 SPASTIC QUADRIPLEGIC CEREBRAL PALSY: Chronic | ICD-10-CM

## 2020-10-22 DIAGNOSIS — G40.909 SEIZURE DISORDER: Chronic | ICD-10-CM

## 2020-10-22 DIAGNOSIS — J18.9 RIGHT LOWER LOBE PNEUMONIA: ICD-10-CM

## 2020-10-22 LAB
ALBUMIN SERPL BCP-MCNC: 2.4 G/DL (ref 3.5–5.2)
ALLENS TEST: ABNORMAL
ALP SERPL-CCNC: 114 U/L (ref 55–135)
ALT SERPL W/O P-5'-P-CCNC: 12 U/L (ref 10–44)
ANION GAP SERPL CALC-SCNC: 11 MMOL/L (ref 8–16)
APTT BLDCRRT: 27.1 SEC (ref 21–32)
AST SERPL-CCNC: 17 U/L (ref 10–40)
BASOPHILS # BLD AUTO: 0.02 K/UL (ref 0–0.2)
BASOPHILS NFR BLD: 0.1 % (ref 0–1.9)
BILIRUB SERPL-MCNC: 0.1 MG/DL (ref 0.1–1)
BILIRUB UR QL STRIP: NEGATIVE
BUN SERPL-MCNC: 17 MG/DL (ref 6–20)
CALCIUM SERPL-MCNC: 9 MG/DL (ref 8.7–10.5)
CHLORIDE SERPL-SCNC: 105 MMOL/L (ref 95–110)
CLARITY UR: CLEAR
CO2 SERPL-SCNC: 20 MMOL/L (ref 23–29)
COLOR UR: YELLOW
CREAT SERPL-MCNC: 0.5 MG/DL (ref 0.5–1.4)
DELSYS: ABNORMAL
DIFFERENTIAL METHOD: ABNORMAL
EOSINOPHIL # BLD AUTO: 0 K/UL (ref 0–0.5)
EOSINOPHIL NFR BLD: 0.1 % (ref 0–8)
ERYTHROCYTE [DISTWIDTH] IN BLOOD BY AUTOMATED COUNT: 13.8 % (ref 11.5–14.5)
ERYTHROCYTE [SEDIMENTATION RATE] IN BLOOD BY WESTERGREN METHOD: 18 MM/H
EST. GFR  (AFRICAN AMERICAN): >60 ML/MIN/1.73 M^2
EST. GFR  (NON AFRICAN AMERICAN): >60 ML/MIN/1.73 M^2
FIO2: 50
GLUCOSE SERPL-MCNC: 109 MG/DL (ref 70–110)
GLUCOSE UR QL STRIP: NEGATIVE
HCO3 UR-SCNC: 22.4 MMOL/L (ref 24–28)
HCT VFR BLD AUTO: 36.3 % (ref 40–54)
HGB BLD-MCNC: 11.2 G/DL (ref 14–18)
HGB UR QL STRIP: NEGATIVE
IMM GRANULOCYTES # BLD AUTO: 0.11 K/UL (ref 0–0.04)
IMM GRANULOCYTES NFR BLD AUTO: 0.6 % (ref 0–0.5)
INR PPP: 1.2 (ref 0.8–1.2)
KETONES UR QL STRIP: NEGATIVE
LACTATE SERPL-SCNC: 1.7 MMOL/L (ref 0.5–2.2)
LACTATE SERPL-SCNC: 2.3 MMOL/L (ref 0.5–2.2)
LEUKOCYTE ESTERASE UR QL STRIP: NEGATIVE
LYMPHOCYTES # BLD AUTO: 1 K/UL (ref 1–4.8)
LYMPHOCYTES NFR BLD: 5.2 % (ref 18–48)
MCH RBC QN AUTO: 29.3 PG (ref 27–31)
MCHC RBC AUTO-ENTMCNC: 30.9 G/DL (ref 32–36)
MCV RBC AUTO: 95 FL (ref 82–98)
MODE: ABNORMAL
MONOCYTES # BLD AUTO: 1.5 K/UL (ref 0.3–1)
MONOCYTES NFR BLD: 7.8 % (ref 4–15)
NEUTROPHILS # BLD AUTO: 16.2 K/UL (ref 1.8–7.7)
NEUTROPHILS NFR BLD: 86.2 % (ref 38–73)
NITRITE UR QL STRIP: NEGATIVE
NRBC BLD-RTO: 0 /100 WBC
PCO2 BLDA: 33.8 MMHG (ref 35–45)
PEEP: 5
PH SMN: 7.43 [PH] (ref 7.35–7.45)
PH UR STRIP: 7 [PH] (ref 5–8)
PLATELET # BLD AUTO: 681 K/UL (ref 150–350)
PMV BLD AUTO: 8.9 FL (ref 9.2–12.9)
PO2 BLDA: 95 MMHG (ref 80–100)
POC BE: -2 MMOL/L
POC SATURATED O2: 98 % (ref 95–100)
POTASSIUM SERPL-SCNC: 4.1 MMOL/L (ref 3.5–5.1)
PROCALCITONIN SERPL IA-MCNC: 0.12 NG/ML
PROT SERPL-MCNC: 7.4 G/DL (ref 6–8.4)
PROT UR QL STRIP: ABNORMAL
PROTHROMBIN TIME: 12.4 SEC (ref 9–12.5)
RBC # BLD AUTO: 3.82 M/UL (ref 4.6–6.2)
SAMPLE: ABNORMAL
SARS-COV-2 RDRP RESP QL NAA+PROBE: NEGATIVE
SITE: ABNORMAL
SODIUM SERPL-SCNC: 136 MMOL/L (ref 136–145)
SP GR UR STRIP: 1.02 (ref 1–1.03)
TROPONIN I SERPL DL<=0.01 NG/ML-MCNC: <0.006 NG/ML (ref 0–0.03)
URN SPEC COLLECT METH UR: ABNORMAL
UROBILINOGEN UR STRIP-ACNC: NEGATIVE EU/DL
VT: 325
WBC # BLD AUTO: 18.73 K/UL (ref 3.9–12.7)

## 2020-10-22 PROCEDURE — 84484 ASSAY OF TROPONIN QUANT: CPT

## 2020-10-22 PROCEDURE — 99900026 HC AIRWAY MAINTENANCE (STAT)

## 2020-10-22 PROCEDURE — 93005 ELECTROCARDIOGRAM TRACING: CPT

## 2020-10-22 PROCEDURE — 25000003 PHARM REV CODE 250: Performed by: INTERNAL MEDICINE

## 2020-10-22 PROCEDURE — 80053 COMPREHEN METABOLIC PANEL: CPT

## 2020-10-22 PROCEDURE — 31500 INSERT EMERGENCY AIRWAY: CPT

## 2020-10-22 PROCEDURE — 63600175 PHARM REV CODE 636 W HCPCS: Performed by: NURSE PRACTITIONER

## 2020-10-22 PROCEDURE — 99291 PR CRITICAL CARE, E/M 30-74 MINUTES: ICD-10-PCS | Mod: ,,, | Performed by: NURSE PRACTITIONER

## 2020-10-22 PROCEDURE — 93010 EKG 12-LEAD: ICD-10-PCS | Mod: ,,, | Performed by: INTERNAL MEDICINE

## 2020-10-22 PROCEDURE — 25000242 PHARM REV CODE 250 ALT 637 W/ HCPCS: Performed by: NURSE PRACTITIONER

## 2020-10-22 PROCEDURE — 36600 WITHDRAWAL OF ARTERIAL BLOOD: CPT

## 2020-10-22 PROCEDURE — 25000003 PHARM REV CODE 250: Performed by: NURSE PRACTITIONER

## 2020-10-22 PROCEDURE — 96375 TX/PRO/DX INJ NEW DRUG ADDON: CPT

## 2020-10-22 PROCEDURE — 63600175 PHARM REV CODE 636 W HCPCS: Mod: JG | Performed by: INTERNAL MEDICINE

## 2020-10-22 PROCEDURE — 99900035 HC TECH TIME PER 15 MIN (STAT)

## 2020-10-22 PROCEDURE — 96376 TX/PRO/DX INJ SAME DRUG ADON: CPT

## 2020-10-22 PROCEDURE — 96365 THER/PROPH/DIAG IV INF INIT: CPT

## 2020-10-22 PROCEDURE — 81003 URINALYSIS AUTO W/O SCOPE: CPT

## 2020-10-22 PROCEDURE — 25000003 PHARM REV CODE 250

## 2020-10-22 PROCEDURE — 85730 THROMBOPLASTIN TIME PARTIAL: CPT

## 2020-10-22 PROCEDURE — 63600175 PHARM REV CODE 636 W HCPCS: Performed by: EMERGENCY MEDICINE

## 2020-10-22 PROCEDURE — 20000000 HC ICU ROOM

## 2020-10-22 PROCEDURE — 63600175 PHARM REV CODE 636 W HCPCS

## 2020-10-22 PROCEDURE — 99291 CRITICAL CARE FIRST HOUR: CPT | Mod: 25

## 2020-10-22 PROCEDURE — 94002 VENT MGMT INPAT INIT DAY: CPT

## 2020-10-22 PROCEDURE — 84145 PROCALCITONIN (PCT): CPT

## 2020-10-22 PROCEDURE — 93010 ELECTROCARDIOGRAM REPORT: CPT | Mod: ,,, | Performed by: INTERNAL MEDICINE

## 2020-10-22 PROCEDURE — 51702 INSERT TEMP BLADDER CATH: CPT

## 2020-10-22 PROCEDURE — 25000003 PHARM REV CODE 250: Performed by: EMERGENCY MEDICINE

## 2020-10-22 PROCEDURE — 87040 BLOOD CULTURE FOR BACTERIA: CPT | Mod: 59

## 2020-10-22 PROCEDURE — 82803 BLOOD GASES ANY COMBINATION: CPT

## 2020-10-22 PROCEDURE — 99291 CRITICAL CARE FIRST HOUR: CPT | Mod: ,,, | Performed by: NURSE PRACTITIONER

## 2020-10-22 PROCEDURE — 25000003 PHARM REV CODE 250: Performed by: SURGERY

## 2020-10-22 PROCEDURE — U0002 COVID-19 LAB TEST NON-CDC: HCPCS

## 2020-10-22 PROCEDURE — 96361 HYDRATE IV INFUSION ADD-ON: CPT

## 2020-10-22 PROCEDURE — 83605 ASSAY OF LACTIC ACID: CPT

## 2020-10-22 PROCEDURE — 85025 COMPLETE CBC W/AUTO DIFF WBC: CPT

## 2020-10-22 PROCEDURE — 27100171 HC OXYGEN HIGH FLOW UP TO 24 HOURS

## 2020-10-22 PROCEDURE — 94640 AIRWAY INHALATION TREATMENT: CPT

## 2020-10-22 PROCEDURE — 85610 PROTHROMBIN TIME: CPT

## 2020-10-22 RX ORDER — FENTANYL CITRAT/DEXTROSE 5%/PF 100 MCG/10
PATIENT CONTROLLED ANALGESIA SYRINGE INTRAVENOUS CONTINUOUS
Status: DISCONTINUED | OUTPATIENT
Start: 2020-10-22 | End: 2020-10-25

## 2020-10-22 RX ORDER — ARGININE/GLUTAMINE/CALCIUM BMB 7G-7G-1.5G
POWDER IN PACKET (EA) ORAL 2 TIMES DAILY
COMMUNITY

## 2020-10-22 RX ORDER — ENOXAPARIN SODIUM 100 MG/ML
40 INJECTION SUBCUTANEOUS EVERY 24 HOURS
Status: DISCONTINUED | OUTPATIENT
Start: 2020-10-22 | End: 2020-10-23

## 2020-10-22 RX ORDER — FAMOTIDINE 10 MG/ML
20 INJECTION INTRAVENOUS 2 TIMES DAILY
Status: DISCONTINUED | OUTPATIENT
Start: 2020-10-22 | End: 2020-10-31

## 2020-10-22 RX ORDER — METOPROLOL TARTRATE 1 MG/ML
5 INJECTION, SOLUTION INTRAVENOUS EVERY 5 MIN PRN
Status: COMPLETED | OUTPATIENT
Start: 2020-10-22 | End: 2020-10-22

## 2020-10-22 RX ORDER — IPRATROPIUM BROMIDE AND ALBUTEROL SULFATE 2.5; .5 MG/3ML; MG/3ML
3 SOLUTION RESPIRATORY (INHALATION) EVERY 4 HOURS
Status: DISCONTINUED | OUTPATIENT
Start: 2020-10-22 | End: 2020-10-25

## 2020-10-22 RX ORDER — ACETAMINOPHEN 325 MG/1
650 TABLET ORAL EVERY 4 HOURS PRN
Status: DISCONTINUED | OUTPATIENT
Start: 2020-10-22 | End: 2020-11-03 | Stop reason: HOSPADM

## 2020-10-22 RX ORDER — L. ACIDOPHILUS/L.BULGARICUS 100MM CELL
1 GRANULES IN PACKET (EA) ORAL DAILY
COMMUNITY

## 2020-10-22 RX ORDER — PHENOBARBITAL 20 MG/5ML
100 ELIXIR ORAL NIGHTLY
Status: DISCONTINUED | OUTPATIENT
Start: 2020-10-22 | End: 2020-11-03 | Stop reason: HOSPADM

## 2020-10-22 RX ORDER — BISACODYL 10 MG
10 SUPPOSITORY, RECTAL RECTAL DAILY PRN
Status: DISCONTINUED | OUTPATIENT
Start: 2020-10-22 | End: 2020-11-03 | Stop reason: HOSPADM

## 2020-10-22 RX ORDER — PROPOFOL 10 MG/ML
5 INJECTION, EMULSION INTRAVENOUS CONTINUOUS
Status: DISCONTINUED | OUTPATIENT
Start: 2020-10-22 | End: 2020-10-25

## 2020-10-22 RX ORDER — VANCOMYCIN HCL IN 5 % DEXTROSE 1G/250ML
1000 PLASTIC BAG, INJECTION (ML) INTRAVENOUS
Status: DISCONTINUED | OUTPATIENT
Start: 2020-10-22 | End: 2020-10-23

## 2020-10-22 RX ORDER — CHLORHEXIDINE GLUCONATE ORAL RINSE 1.2 MG/ML
15 SOLUTION DENTAL 2 TIMES DAILY
Status: DISCONTINUED | OUTPATIENT
Start: 2020-10-22 | End: 2020-10-27

## 2020-10-22 RX ORDER — ACETAMINOPHEN 325 MG/1
325 TABLET ORAL EVERY 4 HOURS PRN
COMMUNITY

## 2020-10-22 RX ORDER — PROPOFOL 10 MG/ML
INJECTION, EMULSION INTRAVENOUS
Status: COMPLETED
Start: 2020-10-22 | End: 2020-10-22

## 2020-10-22 RX ORDER — CIPROFLOXACIN 2 MG/ML
400 INJECTION, SOLUTION INTRAVENOUS
Status: DISCONTINUED | OUTPATIENT
Start: 2020-10-22 | End: 2020-10-26

## 2020-10-22 RX ORDER — LORAZEPAM 2 MG/ML
2 INJECTION INTRAMUSCULAR
Status: COMPLETED | OUTPATIENT
Start: 2020-10-22 | End: 2020-10-22

## 2020-10-22 RX ORDER — MUPIROCIN 20 MG/G
OINTMENT TOPICAL 2 TIMES DAILY
Status: COMPLETED | OUTPATIENT
Start: 2020-10-22 | End: 2020-10-27

## 2020-10-22 RX ADMIN — ENOXAPARIN SODIUM 40 MG: 40 INJECTION SUBCUTANEOUS at 07:10

## 2020-10-22 RX ADMIN — METOROPROLOL TARTRATE 5 MG: 5 INJECTION, SOLUTION INTRAVENOUS at 03:10

## 2020-10-22 RX ADMIN — PIPERACILLIN AND TAZOBACTAM 4.5 G: 4; .5 INJECTION, POWDER, LYOPHILIZED, FOR SOLUTION INTRAVENOUS; PARENTERAL at 03:10

## 2020-10-22 RX ADMIN — MUPIROCIN: 20 OINTMENT TOPICAL at 08:10

## 2020-10-22 RX ADMIN — Medication 25 MCG/HR: at 07:10

## 2020-10-22 RX ADMIN — FAMOTIDINE 20 MG: 10 INJECTION INTRAVENOUS at 08:10

## 2020-10-22 RX ADMIN — PIPERACILLIN AND TAZOBACTAM 4.5 G: 4; .5 INJECTION, POWDER, LYOPHILIZED, FOR SOLUTION INTRAVENOUS; PARENTERAL at 11:10

## 2020-10-22 RX ADMIN — METOROPROLOL TARTRATE 5 MG: 5 INJECTION, SOLUTION INTRAVENOUS at 04:10

## 2020-10-22 RX ADMIN — IPRATROPIUM BROMIDE AND ALBUTEROL SULFATE 3 ML: .5; 3 SOLUTION RESPIRATORY (INHALATION) at 11:10

## 2020-10-22 RX ADMIN — CHLORHEXIDINE GLUCONATE 0.12% ORAL RINSE 15 ML: 1.2 LIQUID ORAL at 08:10

## 2020-10-22 RX ADMIN — LORAZEPAM 2 MG: 2 INJECTION INTRAMUSCULAR; INTRAVENOUS at 03:10

## 2020-10-22 RX ADMIN — VANCOMYCIN HYDROCHLORIDE 1000 MG: 1 INJECTION, POWDER, LYOPHILIZED, FOR SOLUTION INTRAVENOUS at 04:10

## 2020-10-22 RX ADMIN — SODIUM CHLORIDE, SODIUM LACTATE, POTASSIUM CHLORIDE, AND CALCIUM CHLORIDE 1020 ML: .6; .31; .03; .02 INJECTION, SOLUTION INTRAVENOUS at 03:10

## 2020-10-22 RX ADMIN — PROPOFOL 5 MCG/KG/MIN: 10 INJECTION, EMULSION INTRAVENOUS at 02:10

## 2020-10-22 RX ADMIN — ALTEPLASE 2 MG: 2.2 INJECTION, POWDER, LYOPHILIZED, FOR SOLUTION INTRAVENOUS at 07:10

## 2020-10-22 RX ADMIN — IPRATROPIUM BROMIDE AND ALBUTEROL SULFATE 3 ML: .5; 3 SOLUTION RESPIRATORY (INHALATION) at 07:10

## 2020-10-22 RX ADMIN — SODIUM CHLORIDE 500 ML: 0.9 INJECTION, SOLUTION INTRAVENOUS at 10:10

## 2020-10-22 RX ADMIN — CIPROFLOXACIN 400 MG: 2 INJECTION, SOLUTION INTRAVENOUS at 07:10

## 2020-10-22 RX ADMIN — ACETAMINOPHEN 325 MG: 325 SUPPOSITORY RECTAL at 03:10

## 2020-10-22 RX ADMIN — ALTEPLASE 2 MG: 2.2 INJECTION, POWDER, LYOPHILIZED, FOR SOLUTION INTRAVENOUS at 08:10

## 2020-10-22 RX ADMIN — PHENOBARBITAL 100 MG: 20 ELIXIR ORAL at 09:10

## 2020-10-22 NOTE — SUBJECTIVE & OBJECTIVE
Past Medical History:   Diagnosis Date    Acid reflux     Anemia of chronic disease 8/30/2020    Cerebral palsy     Colitis     Decubitus ulcer of ischial area, left, stage IV 9/10/2020    History of hip surgery     Osteomyelitis of pelvis 9/24/2020    Pneumoperitoneum 9/21/2020    Pressure injury of right ischium, stage 4     S/P percutaneous endoscopic gastrostomy (PEG) tube placement     Seizure disorder 8/26/2020    Seizures     Severe protein-calorie malnutrition     Sinus tachycardia 8/4/2020       Past Surgical History:   Procedure Laterality Date    CHOLECYSTECTOMY      FLEXIBLE SIGMOIDOSCOPY N/A 9/9/2020    Procedure: SIGMOIDOSCOPY, FLEXIBLE;  Surgeon: America Goode MD;  Location: [address]);  Service: Endoscopy;  Laterality: N/A;    HIP SURGERY         Review of patient's allergies indicates:  No Known Allergies    No current facility-administered medications on file prior to encounter.      Current Outpatient Medications on File Prior to Encounter   Medication Sig    acetaminophen (TYLENOL) 325 MG tablet 2 tablets (650 mg total) by Per G Tube route every 4 (four) hours as needed (subjective signs of pain).    albuterol-ipratropium (DUO-NEB) 2.5 mg-0.5 mg/3 mL nebulizer solution Take 3 mLs by nebulization every 4 (four) hours as needed for Wheezing or Shortness of Breath. Rescue    ascorbic acid, vitamin C, (VITAMIN C) 500 MG tablet 1 tablet (500 mg total) by Per G Tube route once daily.    baclofen (LIORESAL) 10 MG tablet 1 tablet (10 mg total) by Per G Tube route 3 (three) times daily.    bisacodyL (DULCOLAX) 10 mg Supp Place 1 suppository (10 mg total) rectally daily as needed (Until bowel movement if patient has no bowel movement for 2 days).    ceFEPIme (MAXIPIME) 2 gram injection Inject 2 g into the vein every 8 (eight) hours. End date 10/22/2020. for 13 days    diphenhydrAMINE (BENADRYL) 12.5 mg/5 mL elixir 10 mLs (25 mg total) by Per G Tube route 4 (four) times  daily as needed for Allergies.    gabapentin (NEURONTIN) 250 mg/5 mL solution 5 mLs (250 mg total) by Per G Tube route nightly. At bedtime    Lactobacillus rhamnosus GG (CULTURELLE) 10 billion cell capsule 1 capsule by Per G Tube route once daily.    melatonin (MELATIN) 3 mg tablet 2 tablets (6 mg total) by Per G Tube route nightly as needed for Insomnia.    metroNIDAZOLE (FLAGYL) 500 MG tablet 1 tablet (500 mg total) by Per G Tube route 3 (three) times daily. End date 10/22/2020. for 13 days    multivitamin liquid no.118 Liqd 10 mLs by Per G Tube route once daily.    PHENobarbitaL 20 mg/5 mL (4 mg/mL) Elix elixir 25 mLs (100 mg total) by Per G Tube route every evening.    propranoloL (INDERAL) 20 MG tablet 1 tablet (20 mg total) by Per G Tube route 3 (three) times daily.    vancomycin HCl (VANCOMYCIN 750 MG/250 ML D5W, READY TO MIX SYSTEM,) Inject 250 mLs (750 mg total) into the vein every 12 (twelve) hours. End date 10/22/2020 for 13 days     Family History     Problem Relation (Age of Onset)    Cancer Maternal Grandfather        Tobacco Use    Smoking status: Never Smoker    Smokeless tobacco: Never Used   Substance and Sexual Activity    Alcohol use: Never     Frequency: Never    Drug use: Not Currently    Sexual activity: Not on file     Review of Systems   Unable to perform ROS: Intubated     Objective:     Vital Signs (Most Recent):  Temp: (!) 103 °F (39.4 °C) (10/22/20 1612)  Pulse: (!) 117 (10/22/20 1711)  Resp: 18 (10/22/20 1701)  BP: 109/66 (10/22/20 1701)  SpO2: 100 % (10/22/20 1701) Vital Signs (24h Range):  Temp:  [102 °F (38.9 °C)-103 °F (39.4 °C)] 103 °F (39.4 °C)  Pulse:  [117-172] 117  Resp:  [18-50] 18  SpO2:  [77 %-100 %] 100 %  BP: (109-187)/() 109/66     Weight: 32.3 kg (71 lb 3.3 oz)  Body mass index is 15.41 kg/m².    Physical Exam  Vitals signs and nursing note reviewed.   Constitutional:       General: He is in acute distress.      Appearance: He is underweight. He is  ill-appearing.      Interventions: He is sedated and intubated.   HENT:      Head: Normocephalic and atraumatic.   Eyes:      Pupils: Pupils are equal, round, and reactive to light.   Neck:      Musculoskeletal: Normal range of motion and neck supple.   Cardiovascular:      Rate and Rhythm: Normal rate and regular rhythm.      Pulses: Normal pulses.      Heart sounds: Normal heart sounds.   Pulmonary:      Effort: Pulmonary effort is normal. No tachypnea, accessory muscle usage or respiratory distress. He is intubated.      Breath sounds: Normal breath sounds.   Abdominal:      General: Bowel sounds are normal.      Palpations: Abdomen is soft.      Tenderness: There is no abdominal tenderness.   Musculoskeletal: Normal range of motion.   Skin:     General: Skin is warm and dry.      Capillary Refill: Capillary refill takes less than 2 seconds.   Neurological:      Mental Status: He is unresponsive.      Comments: Unable to perform due to mental status             Significant Labs:   ABGs:   Recent Labs   Lab 10/22/20  1450   PH 7.429   PCO2 33.8*   HCO3 22.4*   POCSATURATED 98   BE -2     Blood Culture: No results for input(s): LABBLOO in the last 48 hours.  BMP:   Recent Labs   Lab 10/22/20  1458         K 4.1      CO2 20*   BUN 17   CREATININE 0.5   CALCIUM 9.0     CBC:   Recent Labs   Lab 10/22/20  1458   WBC 18.73*   HGB 11.2*   HCT 36.3*   *     CMP:   Recent Labs   Lab 10/22/20  1458      K 4.1      CO2 20*      BUN 17   CREATININE 0.5   CALCIUM 9.0   PROT 7.4   ALBUMIN 2.4*   BILITOT 0.1   ALKPHOS 114   AST 17   ALT 12   ANIONGAP 11   EGFRNONAA >60     Lactic Acid:   Recent Labs   Lab 10/22/20  1545   LACTATE 2.3*     Urine Studies:   Recent Labs   Lab 10/22/20  1653   COLORU Yellow   APPEARANCEUA Clear   PHUR 7.0   SPECGRAV 1.025   PROTEINUA Trace*   GLUCUA Negative   KETONESU Negative   BILIRUBINUA Negative   OCCULTUA Negative   NITRITE Negative   UROBILINOGEN  Negative   LEUKOCYTESUR Negative     All pertinent labs within the past 24 hours have been reviewed.    Significant Imaging:   Imaging Results          X-Ray Chest AP Portable (Final result)  Result time 10/22/20 16:09:49    Final result by Martin Perez MD (10/22/20 16:09:49)                 Impression:      See findings above      Electronically signed by: Martin Perez MD  Date:    10/22/2020  Time:    16:09             Narrative:    EXAMINATION:  XR CHEST AP PORTABLE    CLINICAL HISTORY:  Encounter for other preprocedural examination    FINDINGS:  Single view of the chest.  10/04/2020 comparison    Cardiac silhouette is normal.  Endotracheal tube appears appropriate..  Right-sided catheter tip overlies the SVC.  The lungs demonstrate no evidence of consolidation.  Mild atelectasis or infiltrate right perihilar region and right midlung zone.  No pneumothorax or pleural effusion .  Bones appear intact.

## 2020-10-22 NOTE — ED PROVIDER NOTES
SCRIBE #1 NOTE: IJose Ramon, am scribing for, and in the presence of, Stephy Miller DO. I have scribed the HPI, ROS, & PEx.     SCRIBE #2 NOTE: I, Christine Townsend, am scribing for, and in the presence of,  Stephy Miller DO. I have scribed the remaining portions of the note not scribed by Scribe #1.      History     Chief Complaint   Patient presents with    Respiratory Distress     Review of patient's allergies indicates:  No Known Allergies      History of Present Illness     HPI    10/22/2020, 2:25 PM  History obtained from the EMS and patient   HPI limited secondary to respiratory distress.  Patient also has developmental delay.  Patient unable to provide history      History of Present Illness: Glen Moscoso is a 25 y.o. male patient with a PMHx of cerebral palsy who presents to the Emergency Department for evaluation of respiratory distress which onset PTA. EMS reports pt was found face down on the pillow in nursing home. Pt arrives tachycardic and tachypneic. Symptoms are constant and moderate in severity. No mitigating or exacerbating factors reported. No other associated sxs reported. No further complaints or concerns at this time.  Patient at LT[facility].  Patient currently receiving treatment for osteomyelitis of the pelvis as well as sacral decubitus ulcer.  Patient arrived with PICC line in right chest.  Patient reportedly had pulled out his PEG tube.  It appears the feeding tube has been replaced at the nursing home.      Arrival mode: EMS    PCP: Yadi Lawson MD        Past Medical History:  Past Medical History:   Diagnosis Date    Acid reflux     Anemia of chronic disease 8/30/2020    Cerebral palsy     Colitis     Decubitus ulcer of ischial area, left, stage IV 9/10/2020    History of hip surgery     Osteomyelitis of pelvis 9/24/2020    Pneumoperitoneum 9/21/2020    Pressure injury of right ischium, stage 4     S/P percutaneous endoscopic gastrostomy (PEG) tube placement      Seizure disorder 8/26/2020    Seizures     Severe protein-calorie malnutrition     Sinus tachycardia 8/4/2020       Past Surgical History:  Past Surgical History:   Procedure Laterality Date    CHOLECYSTECTOMY      FLEXIBLE SIGMOIDOSCOPY N/A 9/9/2020    Procedure: SIGMOIDOSCOPY, FLEXIBLE;  Surgeon: America Goode MD;  Location: [facility] ([address] [place] [address]);  Service: Endoscopy;  Laterality: N/A;    HIP SURGERY           Family History:  Family History   Problem Relation Age of Onset    Cancer Maternal Grandfather        Social History:  Social History     Tobacco Use    Smoking status: Never Smoker    Smokeless tobacco: Never Used   Substance and Sexual Activity    Alcohol use: Never     Frequency: Never    Drug use: Not Currently    Sexual activity: Unknown        Review of Systems     Review of Systems   Unable to perform ROS: Severe respiratory distress        Physical Exam     Initial Vitals   BP Pulse Resp Temp SpO2   10/22/20 1416 10/22/20 1416 10/22/20 1412 10/22/20 1416 10/22/20 1412   (!) 172/97 (!) 170 (!) 50 (!) 102 °F (38.9 °C) (!) 89 %      MAP       --                 Physical Exam  Nursing Notes and Vital Signs Reviewed.  Constitutional: Patient is in respiratory distress.  Short stature.  Ill appearing. appears anxious.  Head: Atraumatic. Normocephalic.  Eyes: PERRL. EOM intact. Conjunctivae are not pale. No scleral icterus.  ENT: Mucous membranes are moist. Oropharynx is clear and symmetric.    Neck: Supple. Full ROM. No lymphadenopathy.  Cardiovascular: Tachycardic. Regular rhythm. No murmurs, rubs, or gallops. Distal pulses are 2+ and symmetric. ? Permacath right chest  Pulmonary/Chest: Tachypneic.  Rhonchi are noted bilaterally.  Abdominal: Soft and non-distended.  There is no tenderness.  No rebound, guarding, or rigidity. Good bowel sounds.  PEG tube present.  No drainage.  Genitourinary: No CVA tenderness.  3 x 4 decubitus ulcer noted to the left buttock.   It is packed.  Clean boarders,  no odor.  There is a superficial sacral ulcer.  No warmth or redness.  Musculoskeletal Moves all extremities. No obvious deformities. No edema. No calf tenderness.  Skin: Warm and dry.  Neurological:  Alert and awake  Psychiatric: Normal affect.     ED Course   Intubation    Date/Time: 10/22/2020 2:31 PM  Location procedure was performed: [facility] EMERGENCY DEPARTMENT  Performed by: Stephy Miller DO  Authorized by: Stephy Miller DO   Consent Done: Emergent Situation  Indications: respiratory distress  Intubation method: direct  Patient status: paralytics contraindicated as pt is bedridden.  Sedatives: etomidate and propofol  Tube size: 7.5 mm  Tube type: cuffed  Number of attempts: 1  Comments: Unable to sufficiently relaxed patient for intubation.  Given patient's bed-bound status, concerned about administering succinylcholine to this patient (concerns about concerns about renal insufficiency and potential hyperkalemia.)  Therefore, they patient was not intubated by the emergency physician.  Anesthesia was consulted.  Patient was intubated by Dr. Tinajero from anesthesiology.  Patient had a 7.5 mm endotracheal tube placed.  Positive CO2 change.  There are coarse breath sounds bilaterally.  Taped at 23 at the lip.  Placement was confirmed by x-ray    Critical Care    Date/Time: 10/22/2020 2:25 PM  Performed by: Stephy Miller DO  Authorized by: Barney Guerrero MD   Direct patient critical care time: 25 minutes  Additional history critical care time: 2 minutes  Ordering / reviewing critical care time: 2 minutes  Documentation critical care time: 12 minutes  Consulting other physicians critical care time: 10 minutes  Total critical care time (exclusive of procedural time) : 51 minutes  Critical care time was exclusive of separately billable procedures and treating other patients.  Critical care was necessary to treat or prevent imminent or life-threatening deterioration of the following conditions:  respiratory failure and sepsis.  Critical care was time spent personally by me on the following activities: blood draw for specimens, discussions with consultants, development of treatment plan with patient or surrogate, evaluation of patient's response to treatment, examination of patient, interpretation of cardiac output measurements, obtaining history from patient or surrogate, ordering and performing treatments and interventions, ordering and review of laboratory studies, ordering and review of radiographic studies, pulse oximetry, re-evaluation of patient's condition and vascular access procedures.      Intubation performed by Rafat Tinajero MD (anesthesiology).      ED Vital Signs:  Vitals:    10/22/20 1443 10/22/20 1444 10/22/20 1452 10/22/20 1500   BP:    122/88   Pulse:  (!) 155 (!) 164 (!) 130   Resp:  (!) 34 (!) 37 20   Temp:    (!) 103 °F (39.4 °C)   TempSrc:    Rectal   SpO2:  100% 100% 100%   Weight: 32.3 kg (71 lb 3.3 oz)       10/22/20 1501 10/22/20 1601 10/22/20 1612 10/22/20 1633   BP: 119/85 120/79 110/62    Pulse: (!) 172 (!) 133 (!) 133 (!) 120   Resp: (!) 24 19 19 19   Temp: (!) 103 °F (39.4 °C)  (!) 103 °F (39.4 °C)    TempSrc: Rectal  Rectal    SpO2:  100% 100% 100%   Weight:        10/22/20 1652 10/22/20 1653 10/22/20 1701 10/22/20 1711   BP: 114/73  109/66    Pulse:  (!) 121 (!) 118 (!) 117   Resp:  (!) 21 18    Temp:       TempSrc:       SpO2:  100% 100%    Weight:        10/22/20 1722 10/22/20 1731 10/22/20 1732   BP: 108/65  112/69   Pulse: (!) 120 (!) 122 (!) 122   Resp: 19 (!) 22    Temp:   (!) 102.6 °F (39.2 °C)   TempSrc:   Axillary   SpO2: 100% 100% 96%   Weight:          Abnormal Lab Results:  Labs Reviewed   CBC W/ AUTO DIFFERENTIAL - Abnormal; Notable for the following components:       Result Value    WBC 18.73 (*)     RBC 3.82 (*)     Hemoglobin 11.2 (*)     Hematocrit 36.3 (*)     Mean Corpuscular Hemoglobin Conc 30.9 (*)     Platelets 681 (*)     MPV 8.9 (*)     Immature  Granulocytes 0.6 (*)     Gran # (ANC) 16.2 (*)     Immature Grans (Abs) 0.11 (*)     Mono # 1.5 (*)     Gran% 86.2 (*)     Lymph% 5.2 (*)     All other components within normal limits   COMPREHENSIVE METABOLIC PANEL - Abnormal; Notable for the following components:    CO2 20 (*)     Albumin 2.4 (*)     All other components within normal limits   LACTIC ACID, PLASMA - Abnormal; Notable for the following components:    Lactate (Lactic Acid) 2.3 (*)     All other components within normal limits   URINALYSIS, REFLEX TO URINE CULTURE - Abnormal; Notable for the following components:    Protein, UA Trace (*)     All other components within normal limits    Narrative:     Specimen Source->Urine   ISTAT PROCEDURE - Abnormal; Notable for the following components:    POC PCO2 33.8 (*)     POC HCO3 22.4 (*)     All other components within normal limits   CULTURE, BLOOD   CULTURE, BLOOD   TROPONIN I   PROCALCITONIN   PROTIME-INR   APTT   SARS-COV-2 RNA AMPLIFICATION, QUAL   LACTIC ACID, PLASMA        All Lab Results:  Results for orders placed or performed during the hospital encounter of 10/22/20   CBC auto differential   Result Value Ref Range    WBC 18.73 (H) 3.90 - 12.70 K/uL    RBC 3.82 (L) 4.60 - 6.20 M/uL    Hemoglobin 11.2 (L) 14.0 - 18.0 g/dL    Hematocrit 36.3 (L) 40.0 - 54.0 %    Mean Corpuscular Volume 95 82 - 98 fL    Mean Corpuscular Hemoglobin 29.3 27.0 - 31.0 pg    Mean Corpuscular Hemoglobin Conc 30.9 (L) 32.0 - 36.0 g/dL    RDW 13.8 11.5 - 14.5 %    Platelets 681 (H) 150 - 350 K/uL    MPV 8.9 (L) 9.2 - 12.9 fL    Immature Granulocytes 0.6 (H) 0.0 - 0.5 %    Gran # (ANC) 16.2 (H) 1.8 - 7.7 K/uL    Immature Grans (Abs) 0.11 (H) 0.00 - 0.04 K/uL    Lymph # 1.0 1.0 - 4.8 K/uL    Mono # 1.5 (H) 0.3 - 1.0 K/uL    Eos # 0.0 0.0 - 0.5 K/uL    Baso # 0.02 0.00 - 0.20 K/uL    nRBC 0 0 /100 WBC    Gran% 86.2 (H) 38.0 - 73.0 %    Lymph% 5.2 (L) 18.0 - 48.0 %    Mono% 7.8 4.0 - 15.0 %    Eosinophil% 0.1 0.0 - 8.0 %     Basophil% 0.1 0.0 - 1.9 %    Differential Method Automated    Comprehensive metabolic panel   Result Value Ref Range    Sodium 136 136 - 145 mmol/L    Potassium 4.1 3.5 - 5.1 mmol/L    Chloride 105 95 - 110 mmol/L    CO2 20 (L) 23 - 29 mmol/L    Glucose 109 70 - 110 mg/dL    BUN, Bld 17 6 - 20 mg/dL    Creatinine 0.5 0.5 - 1.4 mg/dL    Calcium 9.0 8.7 - 10.5 mg/dL    Total Protein 7.4 6.0 - 8.4 g/dL    Albumin 2.4 (L) 3.5 - 5.2 g/dL    Total Bilirubin 0.1 0.1 - 1.0 mg/dL    Alkaline Phosphatase 114 55 - 135 U/L    AST 17 10 - 40 U/L    ALT 12 10 - 44 U/L    Anion Gap 11 8 - 16 mmol/L    eGFR if African American >60 >60 mL/min/1.73 m^2    eGFR if non African American >60 >60 mL/min/1.73 m^2   Lactic acid, plasma #1   Result Value Ref Range    Lactate (Lactic Acid) 2.3 (H) 0.5 - 2.2 mmol/L   Urinalysis, Reflex to Urine Culture Urine, Clean Catch    Specimen: Urine   Result Value Ref Range    Specimen UA Urine, Catheterized     Color, UA Yellow Yellow, Straw, Vane    Appearance, UA Clear Clear    pH, UA 7.0 5.0 - 8.0    Specific Gravity, UA 1.025 1.005 - 1.030    Protein, UA Trace (A) Negative    Glucose, UA Negative Negative    Ketones, UA Negative Negative    Bilirubin (UA) Negative Negative    Occult Blood UA Negative Negative    Nitrite, UA Negative Negative    Urobilinogen, UA Negative <2.0 EU/dL    Leukocytes, UA Negative Negative   Troponin I   Result Value Ref Range    Troponin I <0.006 0.000 - 0.026 ng/mL   Procalcitonin   Result Value Ref Range    Procalcitonin 0.12 <0.25 ng/mL   Protime-INR   Result Value Ref Range    Prothrombin Time 12.4 9.0 - 12.5 sec    INR 1.2 0.8 - 1.2   APTT   Result Value Ref Range    aPTT 27.1 21.0 - 32.0 sec   COVID-19 Rapid Screening   Result Value Ref Range    SARS-CoV-2 RNA, Amplification, Qual Negative Negative   ISTAT PROCEDURE   Result Value Ref Range    POC PH 7.429 7.35 - 7.45    POC PCO2 33.8 (L) 35 - 45 mmHg    POC PO2 95 80 - 100 mmHg    POC HCO3 22.4 (L) 24 - 28 mmol/L     POC BE -2 -2 to 2 mmol/L    POC SATURATED O2 98 95 - 100 %    Rate 18     Sample ARTERIAL     Site RB     Allens Test Pass     DelSys Adult Vent     Mode AC/PRVC     Vt 325     PEEP 5     FiO2 50          Imaging Results:  Imaging Results           CT Chest Abdomen Pelvis Without Contrast (XPD) (Final result)  Result time 10/22/20 18:11:00    Final result by Reji Marcelo MD (10/22/20 18:11:00)                 Impression:      Right lower lobe probable pneumonia.  Osseous erosion could appear similarly but is less favored.    Additional scattered areas of consolidation in the right lung likely also related to infection.    There is a large decubitus ulcer right greater than left. The right ulcer tracks to the posterior aspect of the right inferior pubic ramus. Osteomyelitis cannot be excluded.    Other incidental findings as above.    This report was flagged in Epic as abnormal.    All CT scans at this facility are performed  using dose modulation techniques as appropriate to performed exam including the following:  automated exposure control; adjustment of mA and/or kV according to the patients size (this includes techniques or standardized protocols for targeted exams where dose is matched to indication/reason for exam: i.e. extremities or head);  iterative reconstruction technique.      Electronically signed by: Reji Marcelo  Date:    10/22/2020  Time:    18:11             Narrative:    EXAMINATION:  CT CHEST ABDOMEN PELVIS WITHOUT CONTRAST(XPD)    CLINICAL HISTORY:  fever;    TECHNIQUE:  Axial images of the chest, abdomen, and pelvis were acquired  without IV contrast.   Coronal and sagittal reconstructions were also obtained    COMPARISON:  None    FINDINGS:  Thoracic soft tissues: No significant abnormality.    Aorta: Normal in course and caliber, without significant atherosclerotic plaque.    Heart: Normal in size. No pericardial effusion.    Nadeen/Mediastinum: No significant lymphadenopathy    Lungs:  There is consolidation in the right lower lobe with air bronchograms concerning for pneumonia.  Aspiration could appear similarly.  Additional few scattered areas of consolidation in the right lung.    Liver: Normal in size and attenuation, with no focal hepatic lesions.    Gallbladder: Gallbladder surgically absent.    Bile Ducts: No evidence of dilated ducts.    Pancreas: No mass or peripancreatic fat stranding.    Spleen: Unremarkable.    Adrenals: Unremarkable.    Kidneys/ Ureters: Normal in size and location.  No hydronephrosis or nephrolithiasis. No ureteral dilatation.    Bladder: Iglesias catheter in place.    Reproductive organs: Unremarkable.    GI Tract/Mesentery: Gastrostomy tube in place.  There is a moderate stool burden through the colon.  Predominantly liquid stool.  This represent diarrheal illness.    Peritoneal Space: No ascites. No free air.    Retroperitoneum:  No significant adenopathy.    Abdominal wall and bones: There is a large decubitus ulcer right greater than left.  The right ulcer tracks to the posterior aspect of the right inferior pubic ramus.  Osteomyelitis cannot be excluded.  Deformities of the bilateral hips possibly congenital or related to prior infection.    Vasculature: No significant atherosclerosis or aneurysm.                               X-Ray Chest AP Portable (Final result)  Result time 10/22/20 16:09:49    Final result by Martin Perez MD (10/22/20 16:09:49)                 Impression:      See findings above      Electronically signed by: Martin Perez MD  Date:    10/22/2020  Time:    16:09             Narrative:    EXAMINATION:  XR CHEST AP PORTABLE    CLINICAL HISTORY:  Encounter for other preprocedural examination    FINDINGS:  Single view of the chest.  10/04/2020 comparison    Cardiac silhouette is normal.  Endotracheal tube appears appropriate..  Right-sided catheter tip overlies the SVC.  The lungs demonstrate no evidence of consolidation.  Mild atelectasis or  infiltrate right perihilar region and right midlung zone.  No pneumothorax or pleural effusion .  Bones appear intact.                                 The EKG was ordered, reviewed, and independently interpreted by the ED provider.  Interpretation time: 15:07  Rate: 171 BPM  Rhythm: Sinus tachycardia with PVC  Interpretation: Lateral infarct. No STEMI.  When compared to EKG performed 20-SEP-2020, there are no significant changes.           The Emergency Provider reviewed the vital signs and test results, which are outlined above.     ED Discussion     4:46 PM Spoke with RICARDO Moscoso NP.  She requests CT of the chest/abdomen/pelvis as no source indentified.     4:51 PM Spoke with RICARDO Moscoso NP.  She recommends admission to the ICU, Dr. Guerrero.  We suspect patient may have aspiration pneumonia    4:52 PM  Fluid challenge completed.  Vital signs have been reviewed.  A focused perfusion assessment (septic focused exam) was completed.        6:20 PM CTA of the chest abdomen pelvis were reviewed.  Findings were significant for right lower lobe pneumonia, likely aspiration pneumonia.  The G-tube was in appropriate position.  There was stool that was present suggesting constipation.  These results were discussed with the hospital team, RICARDO Moscoso NP            ED Medication(s):  Medications   propofol (DIPRIVAN) 10 mg/mL infusion (10 mcg/kg/min × 34 kg Intravenous Rate/Dose Change 10/22/20 1500)   vancomycin - pharmacy to dose (has no administration in time range)   vancomycin in dextrose 5 % 1 gram/250 mL IVPB 1,000 mg (1,000 mg Intravenous New Bag 10/22/20 1625)   PHENobarbitaL elixir 100 mg (has no administration in time range)   albuterol-ipratropium 2.5 mg-0.5 mg/3 mL nebulizer solution 3 mL (has no administration in time range)   acetaminophen tablet 650 mg (has no administration in time range)   bisacodyL suppository 10 mg (has no administration in time range)   piperacillin-tazobactam 4.5 g in dextrose 5 % 100 mL IVPB  (ready to mix system) (has no administration in time range)   ciprofloxacin (CIPRO)400mg/200ml D5W IVPB 400 mg (has no administration in time range)   fentaNYL 2500 mcg in D5W 250 mL infusion premix (titrating) (conc: 10 mcg/mL) (has no administration in time range)   lactated ringers bolus 1,020 mL (0 mL/kg × 34 kg Intravenous Stopped 10/22/20 1706)   acetaminophen suppository 325 mg (325 mg Rectal Given 10/22/20 1506)   piperacillin-tazobactam 4.5 g in dextrose 5 % 100 mL IVPB (ready to mix system) (0 g Intravenous Stopped 10/22/20 1620)   metoprolol injection 5 mg (5 mg Intravenous Given 10/22/20 1638)   lorazepam injection 2 mg (2 mg Intravenous Given 10/22/20 1527)       Current Discharge Medication List                  Scribe Attestation:   Scribe #1: I performed the above scribed service and the documentation accurately describes the services I performed. I attest to the accuracy of the note.     Attending:   Physician Attestation Statement for Scribe #1: I, Stephy Miller DO, personally performed the services described in this documentation, as scribed by Jose Ramon Esquivel, in my presence, and it is both accurate and complete.       Scribe Attestation:   Scribe #2: I performed the above scribed service and the documentation accurately describes the services I performed. I attest to the accuracy of the note.    Attending Attestation:           Physician Attestation for Scribe:    Physician Attestation Statement for Scribe #2: I, Stephy Miller DO, reviewed documentation, as scribed by Christine Townsend in my presence, and it is both accurate and complete. I also acknowledge and confirm the content of the note done by Buffy #1.           Clinical Impression       ICD-10-CM ICD-9-CM   1. Respiratory distress  R06.03 786.09   2. Tachycardia  R00.0 785.0   3. Encounter for intubation  Z01.818 V72.83   4. Pressure injury of skin of left buttock, unspecified injury stage  L89.329 707.05     707.20   5. Elevated  lactic acid level  R79.89 276.2   6. Sepsis, due to unspecified organism, unspecified whether acute organ dysfunction present  A41.9 038.9     995.91   7. Aspiration pneumonia of right lower lobe, unspecified aspiration pneumonia type  J69.0 507.0       Disposition:   Disposition: Admitted  Condition: Serious         Stephy Miller, DO  10/22/20 1824       Stephy Miller, DO  10/23/20 0944

## 2020-10-22 NOTE — ED NOTES
Mother, Leni Moscoso, calls and states we can only give information to the following people regarding her son's condition: Darrick,  with the state [phone], Paige Moscoso, grandmother [phone], Dora with the state [phone], and Monica SIEGEL with state [phone]. Mother informed we will utilize her as the primary contact and she can then disseminate information to his case workers.

## 2020-10-22 NOTE — PROGRESS NOTES
Patient intubated by Dr. Tinajero with anesthesia. Patient now intubated with 7.5 ETT 23 cm @ lips. Positive color change and bilateral breath sounds heard. Intubation tolerated well. Now on .

## 2020-10-22 NOTE — SUBJECTIVE & OBJECTIVE
Past Medical History:   Diagnosis Date    Acid reflux     Anemia of chronic disease 8/30/2020    Cerebral palsy     Colitis     Decubitus ulcer of ischial area, left, stage IV 9/10/2020    History of hip surgery     Osteomyelitis of pelvis 9/24/2020    Pneumoperitoneum 9/21/2020    Pressure injury of right ischium, stage 4     S/P percutaneous endoscopic gastrostomy (PEG) tube placement     Seizure disorder 8/26/2020    Seizures     Severe protein-calorie malnutrition     Sinus tachycardia 8/4/2020       Past Surgical History:   Procedure Laterality Date    CHOLECYSTECTOMY      FLEXIBLE SIGMOIDOSCOPY N/A 9/9/2020    Procedure: SIGMOIDOSCOPY, FLEXIBLE;  Surgeon: America Goode MD;  Location: [facility] ([address] [place] [address]);  Service: Endoscopy;  Laterality: N/A;    HIP SURGERY         Review of patient's allergies indicates:  No Known Allergies    Family History     Problem Relation (Age of Onset)    Cancer Maternal Grandfather        Tobacco Use    Smoking status: Never Smoker    Smokeless tobacco: Never Used   Substance and Sexual Activity    Alcohol use: Never     Frequency: Never    Drug use: Not Currently    Sexual activity: Not on file         Review of Systems   Unable to perform ROS: Intubated     Objective:     Vital Signs (Most Recent):  Temp: (!) 102.6 °F (39.2 °C) (10/22/20 1732)  Pulse: (!) 122 (10/22/20 1732)  Resp: (!) 22 (10/22/20 1731)  BP: 112/69 (10/22/20 1732)  SpO2: 96 % (10/22/20 1732) Vital Signs (24h Range):  Temp:  [102 °F (38.9 °C)-103 °F (39.4 °C)] 102.6 °F (39.2 °C)  Pulse:  [117-172] 122  Resp:  [18-50] 22  SpO2:  [77 %-100 %] 96 %  BP: (108-187)/() 112/69     Weight: 32.3 kg (71 lb 3.3 oz)  Body mass index is 15.41 kg/m².      Intake/Output Summary (Last 24 hours) at 10/22/2020 1840  Last data filed at 10/22/2020 1706  Gross per 24 hour   Intake 1120 ml   Output --   Net 1120 ml       Physical Exam  Vitals signs and nursing note reviewed.   Constitutional:        Appearance: He is underweight. He is ill-appearing.      Interventions: He is sedated and intubated.   HENT:      Head: Atraumatic.   Eyes:      Conjunctiva/sclera: Conjunctivae normal.   Neck:      Musculoskeletal: No edema.      Comments: Severe bilateral upper and lower extremity contractures  Cardiovascular:      Rate and Rhythm: Regular rhythm. Tachycardia present.      Pulses:           Radial pulses are 1+ on the right side and 1+ on the left side.        Dorsalis pedis pulses are 1+ on the right side and 1+ on the left side.   Pulmonary:      Effort: He is intubated.      Breath sounds: Decreased breath sounds present. No wheezing, rhonchi or rales.   Abdominal:      General: Bowel sounds are decreased. There is no distension.      Palpations: Abdomen is soft.      Comments: Scarring around button PEG   Musculoskeletal:      Right lower leg: No edema.      Left lower leg: No edema.   Skin:         Neurological:      GCS: GCS eye subscore is 3. GCS verbal subscore is 1. GCS motor subscore is 1.         Vents:  Vent Mode: A/C (10/22/20 1804)  Ventilator Initiated: Yes (10/22/20 1444)  Set Rate: 18 BPM (10/22/20 1804)  Vt Set: 325 mL (10/22/20 1804)  Pressure Support: 0 cmH20 (10/22/20 1804)  PEEP/CPAP: 5 cmH20 (10/22/20 1804)  Oxygen Concentration (%): 50 (10/22/20 1804)  Peak Airway Pressure: 21 cmH2O (10/22/20 1804)  Plateau Pressure: 0 cmH20 (10/22/20 1804)  Total Ve: 7.87 mL (10/22/20 1804)  F/VT Ratio<105 (RSBI): (!) 60.51 (10/22/20 1633)    Lines/Drains/Airways     Central Venous Catheter Line            Permacath 09/28/20 24 days          Drain                 Gastrostomy/Enterostomy 08/04/20 1653 Percutaneous endoscopic gastrostomy (PEG) feeding 79 days         Urethral Catheter 10/22/20 1653 Latex 16 Fr. less than 1 day          Airway                 Airway - Non-Surgical 10/22/20 1430 Endotracheal Tube less than 1 day          Peripheral Intravenous Line                 Midline Catheter  Insertion/Assessment  - Single Lumen 10/22/20 1548 Right basilic vein (medial side of arm) 20g x 8cm less than 1 day                Significant Labs:    CBC/Anemia Profile:  Recent Labs   Lab 10/22/20  1458   WBC 18.73*   HGB 11.2*   HCT 36.3*   *   MCV 95   RDW 13.8        Chemistries:  Recent Labs   Lab 10/22/20  1458      K 4.1      CO2 20*   BUN 17   CREATININE 0.5   CALCIUM 9.0   ALBUMIN 2.4*   PROT 7.4   BILITOT 0.1   ALKPHOS 114   ALT 12   AST 17       All pertinent labs within the past 24 hours have been reviewed.    Significant Imaging:   I have reviewed all pertinent imaging results/findings within the past 24 hours.

## 2020-10-22 NOTE — ANESTHESIA PROCEDURE NOTES
Intubation  Performed by: Fallon Tinajero MD  Authorized by: Fallon Tniajero MD     Intubation:     Induction:  Intravenous    Intubated:  Postinduction    Mask Ventilation:  Easy mask    Attempts:  1    Attempted By:  Staff anesthesiologist    Method of Intubation:  Direct    Blade:  Vesta 3    Laryngeal View Grade: Grade IIA - cords partially seen      Difficult Airway Encountered?: No      Complications:  None    Airway Device:  Direct and oral endotracheal tube    Airway Device Size:  7.5    Style/Cuff Inflation:  Cuffed (inflated to minimal occlusive pressure)    Tube secured:  22    Secured at:  The lips    Placement Verified By:  Auscultation and Capnometry    Complicating Factors:  Anterior larynx and none    Findings Post-Intubation:  Bilateral breath sounds, positive ETCO2 and atraumatic / condition of teeth unchanged

## 2020-10-22 NOTE — ASSESSMENT & PLAN NOTE
Sedated for mechanical ventilation  Resume baclofen, neurontin tomorrow pending hemodynamic stability

## 2020-10-22 NOTE — H&P (VIEW-ONLY)
[facility] -   Critical Care Medicine  Consult Note    Patient Name: Glen Moscoso  MRN: [id number]  Admission Date: 10/22/2020  Hospital Length of Stay: 0 days  Code Status: Full Code  Attending Physician: Barney Guerrero MD   Primary Care Provider: Yadi Lawson MD   Principal Problem: Acute hypoxemic respiratory failure      Subjective:     HPI:  25 year old male with spastic cerebral palsy, severe contractures, seizure disorder, malnutrition  Presented to ED from LTAC when found face down, unresponsive, with hypoxia  OF NOTE - recent hospitalizations x 2 for pneumonia (serratia), second hospitalization complicated with osteomyelitis from sacral decubitus (underwent surgical I&D) and pneumoperitoneum from suspected colonic perforation that was managed conservatively    On arrival today respiratory rate 50, , sat 89%, SBP 170s, and fever 102  Intubated on ED arrival with anesthesia assistance  Labs revealed leukocytosis, lactic acid 2.3,and procalcitonin 0.12  CT chest abd pelvis revealed - Right lower lobe probable pneumonia.  Osseous erosion could appear similarly but is less favored.  Additional scattered areas of consolidation in the right lung likely also related to infection.  There is a large decubitus ulcer right greater than left. The right ulcer tracks to the posterior aspect of the right inferior pubic ramus. Osteomyelitis cannot be excluded.     Hospital/ICU Course:  Arrived to ICU with OETT to mechanical ventilation, propofol sedation    Past Medical History:   Diagnosis Date    Acid reflux     Anemia of chronic disease 8/30/2020    Cerebral palsy     Colitis     Decubitus ulcer of ischial area, left, stage IV 9/10/2020    History of hip surgery     Osteomyelitis of pelvis 9/24/2020    Pneumoperitoneum 9/21/2020    Pressure injury of right ischium, stage 4     S/P percutaneous endoscopic gastrostomy (PEG) tube placement     Seizure disorder 8/26/2020    Seizures      Severe protein-calorie malnutrition     Sinus tachycardia 8/4/2020       Past Surgical History:   Procedure Laterality Date    CHOLECYSTECTOMY      FLEXIBLE SIGMOIDOSCOPY N/A 9/9/2020    Procedure: SIGMOIDOSCOPY, FLEXIBLE;  Surgeon: America Goode MD;  Location: [facility] ([address] [place] [address]);  Service: Endoscopy;  Laterality: N/A;    HIP SURGERY         Review of patient's allergies indicates:  No Known Allergies    Family History     Problem Relation (Age of Onset)    Cancer Maternal Grandfather        Tobacco Use    Smoking status: Never Smoker    Smokeless tobacco: Never Used   Substance and Sexual Activity    Alcohol use: Never     Frequency: Never    Drug use: Not Currently    Sexual activity: Not on file         Review of Systems   Unable to perform ROS: Intubated     Objective:     Vital Signs (Most Recent):  Temp: (!) 102.6 °F (39.2 °C) (10/22/20 1732)  Pulse: (!) 122 (10/22/20 1732)  Resp: (!) 22 (10/22/20 1731)  BP: 112/69 (10/22/20 1732)  SpO2: 96 % (10/22/20 1732) Vital Signs (24h Range):  Temp:  [102 °F (38.9 °C)-103 °F (39.4 °C)] 102.6 °F (39.2 °C)  Pulse:  [117-172] 122  Resp:  [18-50] 22  SpO2:  [77 %-100 %] 96 %  BP: (108-187)/() 112/69     Weight: 32.3 kg (71 lb 3.3 oz)  Body mass index is 15.41 kg/m².      Intake/Output Summary (Last 24 hours) at 10/22/2020 1840  Last data filed at 10/22/2020 1706  Gross per 24 hour   Intake 1120 ml   Output --   Net 1120 ml       Physical Exam  Vitals signs and nursing note reviewed.   Constitutional:       Appearance: He is underweight. He is ill-appearing.      Interventions: He is sedated and intubated.   HENT:      Head: Atraumatic.   Eyes:      Conjunctiva/sclera: Conjunctivae normal.   Neck:      Musculoskeletal: No edema.      Comments: Severe bilateral upper and lower extremity contractures  Cardiovascular:      Rate and Rhythm: Regular rhythm. Tachycardia present.      Pulses:           Radial pulses are 1+ on the right side and 1+ on the left  side.        Dorsalis pedis pulses are 1+ on the right side and 1+ on the left side.   Pulmonary:      Effort: He is intubated.      Breath sounds: Decreased breath sounds present. No wheezing, rhonchi or rales.   Abdominal:      General: Bowel sounds are decreased. There is no distension.      Palpations: Abdomen is soft.      Comments: Scarring around button PEG   Musculoskeletal:      Right lower leg: No edema.      Left lower leg: No edema.   Skin:         Neurological:      GCS: GCS eye subscore is 3. GCS verbal subscore is 1. GCS motor subscore is 1.         Vents:  Vent Mode: A/C (10/22/20 1804)  Ventilator Initiated: Yes (10/22/20 1444)  Set Rate: 18 BPM (10/22/20 1804)  Vt Set: 325 mL (10/22/20 1804)  Pressure Support: 0 cmH20 (10/22/20 1804)  PEEP/CPAP: 5 cmH20 (10/22/20 1804)  Oxygen Concentration (%): 50 (10/22/20 1804)  Peak Airway Pressure: 21 cmH2O (10/22/20 1804)  Plateau Pressure: 0 cmH20 (10/22/20 1804)  Total Ve: 7.87 mL (10/22/20 1804)  F/VT Ratio<105 (RSBI): (!) 60.51 (10/22/20 1633)    Lines/Drains/Airways     Central Venous Catheter Line            Permacath 09/28/20 24 days          Drain                 Gastrostomy/Enterostomy 08/04/20 1653 Percutaneous endoscopic gastrostomy (PEG) feeding 79 days         Urethral Catheter 10/22/20 1653 Latex 16 Fr. less than 1 day          Airway                 Airway - Non-Surgical 10/22/20 1430 Endotracheal Tube less than 1 day          Peripheral Intravenous Line                 Midline Catheter Insertion/Assessment  - Single Lumen 10/22/20 1548 Right basilic vein (medial side of arm) 20g x 8cm less than 1 day                Significant Labs:    CBC/Anemia Profile:  Recent Labs   Lab 10/22/20  1458   WBC 18.73*   HGB 11.2*   HCT 36.3*   *   MCV 95   RDW 13.8        Chemistries:  Recent Labs   Lab 10/22/20  1458      K 4.1      CO2 20*   BUN 17   CREATININE 0.5   CALCIUM 9.0   ALBUMIN 2.4*   PROT 7.4   BILITOT 0.1   ALKPHOS 114   ALT  12   AST 17       All pertinent labs within the past 24 hours have been reviewed.    Significant Imaging:   I have reviewed all pertinent imaging results/findings within the past 24 hours.      ABG  Recent Labs   Lab 10/22/20  1450   PH 7.429   PO2 95   PCO2 33.8*   HCO3 22.4*   BE -2     Assessment/Plan:     Neuro  Seizure disorder  Continue home dose phenobarbital    Cerebral palsy  Sedated for mechanical ventilation  Resume baclofen, neurontin tomorrow pending hemodynamic stability    Derm  Decubitus ulcer of ischial area, left, stage IV  WOC to eval; wound care per recs    Pulmonary  Right lower lobe pneumonia  Recent flouroquinolone resistant seratia pneumonia  Empiric vanc, zosyn  Culture endotracheal aspirate  Monitor temp, wbc, culture data    Acute hypoxemic respiratory failure  Vent settings reviewed and adjusted to optimize gas exchange  VAP prophylaxis    Cardiac/Vascular  Tachycardia  Suspect baseline sinus tachycardia exacerbated by fever, sepsis  Holding enteral propanolol given marginal BP  Optimize analgesia, sedation    Endocrine  Severe protein-calorie malnutrition  TF per RD recs in am        Critical Care Daily Checklist:    A: Awake: RASS Goal/Actual Goal:    Actual:     B: Spontaneous Breathing Trial Performed?     C: SAT & SBT Coordinated?  Not candidate                       D: Delirium: CAM-ICU     E: Early Mobility Performed? Yes   F: Feeding Goal:    Status:     Current Diet Order   Procedures    Diet NPO      AS: Analgesia/Sedation Fentanyl, propofol   T: Thromboembolic Prophylaxis lovenox   H: HOB > 300 Yes   U: Stress Ulcer Prophylaxis (if needed) pepcid   G: Glucose Control monitor   B: Bowel Function     I: Indwelling Catheter (Lines & Iglesias) Necessity reviewed   D: De-escalation of Antimicrobials/Pharmacotherapies reviewed    Plan for the day/ETD As above    Code Status:  Family/Goals of Care: Full Code  Anticipate return to LTAC/SNF facility   I have discussed case and plan of  care in detail with Dr Garcia; Status and plan of care were discussed with team on multidisciplinary rounds.    Critical Care Time: 50 minutes  Critical secondary to Patient has a condition that poses threat to life and bodily function: Severe Respiratory Distress     Critical care was time spent personally by me on the following activities: development of treatment plan with patient or surrogate and bedside caregivers, discussions with consultants, evaluation of patient's response to treatment, examination of patient, ordering and performing treatments and interventions, ordering and review of laboratory studies, ordering and review of radiographic studies, pulse oximetry, re-evaluation of patient's condition. This critical care time did not overlap with that of any other provider or involve time for any procedures.    Thank you for your consult.      JEFF Panchal-BC  Critical Care Medicine  [facility]

## 2020-10-22 NOTE — HPI
Glen Moscoso is a 25 y.o. male patient with a PMHx of cerebral palsy who presents to the Emergency Department for evaluation of respiratory distress which onset PTA. EMS reports pt was found face down on the pillow in nursing home. Pt arrives tachycardic and tachypneic. Patient recently discharge from [facility] where he was treated for respiratory failure and sepsis. Patient was discharged on 10/10/20 to [facility] on IV Vancomycin, Cefepime and Flagyl to treat pelvic osteomyelitis and resolving colitis. In the ED, WBCs 18.7K, Platelet 681, CO2 20, Lactate 2.3. UA negative. CXR revealed mild atelectasis or infiltrate right perihilar region and right midlung zone. Pt with worsening resp failure Subsequently pt was intubated. Sepsis protocol initiated in the ED including IV hydration. Hospital medicine called for admission. Patient placed in ICU. Patient is a full code, SDM mother, Leni Moscoso at [phone].

## 2020-10-22 NOTE — ED NOTES
Propofol 50mg IVP given by Kylah STANLEY Rn per Dr Tinajero with anesthesiology verbal order for intubation

## 2020-10-22 NOTE — HPI
25 year old male with spastic cerebral palsy, severe contractures, seizure disorder, malnutrition  Presented to ED from LTAC when found face down, unresponsive, with hypoxia  OF NOTE - recent hospitalizations x 2 for pneumonia (serratia), second hospitalization complicated with osteomyelitis from sacral decubitus (underwent surgical I&D) and pneumoperitoneum from suspected colonic perforation that was managed conservatively    On arrival today respiratory rate 50, , sat 89%, SBP 170s, and fever 102  Intubated on ED arrival with anesthesia assistance  Labs revealed leukocytosis, lactic acid 2.3,and procalcitonin 0.12  CT chest abd pelvis revealed - Right lower lobe probable pneumonia.  Osseous erosion could appear similarly but is less favored.  Additional scattered areas of consolidation in the right lung likely also related to infection.  There is a large decubitus ulcer right greater than left. The right ulcer tracks to the posterior aspect of the right inferior pubic ramus. Osteomyelitis cannot be excluded.

## 2020-10-22 NOTE — H&P
[facility] Hospital Medicine  History & Physical    Patient Name: Glen Moscoso  MRN: [id number]  Admission Date: 10/22/2020  Attending Physician: Barney Guerrero MD  Primary Care Provider: Yadi Lawson MD         Patient information was obtained from ER records.     Subjective:     Principal Problem:<principal problem not specified>    Chief Complaint:   Chief Complaint   Patient presents with    Respiratory Distress        HPI: Glen Moscoso is a 25 y.o. male patient with a PMHx of cerebral palsy who presents to the Emergency Department for evaluation of respiratory distress which onset PTA. EMS reports pt was found face down on the pillow in nursing home. Pt arrives tachycardic and tachypneic. Patient recently discharge from [facility] where he was treated for respiratory failure and sepsis. Patient was discharged on 10/10/20 to [facility] on IV Vancomycin, Cefepime and Flagyl to treat pelvic osteomyelitis and resolving colitis. In the ED, WBCs 18.7K, Platelet 681, CO2 20, Lactate 2.3. UA negative. CXR revealed mild atelectasis or infiltrate right perihilar region and right midlung zone. Pt with worsening resp failure Subsequently pt was intubated. Sepsis protocol initiated in the ED including IV hydration. Hospital medicine called for admission. Patient placed in ICU. Patient is a full code, SDM mother, Leni Moscoso at [phone].     Past Medical History:   Diagnosis Date    Acid reflux     Anemia of chronic disease 8/30/2020    Cerebral palsy     Colitis     Decubitus ulcer of ischial area, left, stage IV 9/10/2020    History of hip surgery     Osteomyelitis of pelvis 9/24/2020    Pneumoperitoneum 9/21/2020    Pressure injury of right ischium, stage 4     S/P percutaneous endoscopic gastrostomy (PEG) tube placement     Seizure disorder 8/26/2020    Seizures     Severe protein-calorie malnutrition     Sinus tachycardia 8/4/2020       Past Surgical History:   Procedure  Laterality Date    CHOLECYSTECTOMY      FLEXIBLE SIGMOIDOSCOPY N/A 9/9/2020    Procedure: SIGMOIDOSCOPY, FLEXIBLE;  Surgeon: America Goode MD;  Location: [facility] ([address] [place] [address]);  Service: Endoscopy;  Laterality: N/A;    HIP SURGERY         Review of patient's allergies indicates:  No Known Allergies    No current facility-administered medications on file prior to encounter.      Current Outpatient Medications on File Prior to Encounter   Medication Sig    acetaminophen (TYLENOL) 325 MG tablet 2 tablets (650 mg total) by Per G Tube route every 4 (four) hours as needed (subjective signs of pain).    albuterol-ipratropium (DUO-NEB) 2.5 mg-0.5 mg/3 mL nebulizer solution Take 3 mLs by nebulization every 4 (four) hours as needed for Wheezing or Shortness of Breath. Rescue    ascorbic acid, vitamin C, (VITAMIN C) 500 MG tablet 1 tablet (500 mg total) by Per G Tube route once daily.    baclofen (LIORESAL) 10 MG tablet 1 tablet (10 mg total) by Per G Tube route 3 (three) times daily.    bisacodyL (DULCOLAX) 10 mg Supp Place 1 suppository (10 mg total) rectally daily as needed (Until bowel movement if patient has no bowel movement for 2 days).    ceFEPIme (MAXIPIME) 2 gram injection Inject 2 g into the vein every 8 (eight) hours. End date 10/22/2020. for 13 days    diphenhydrAMINE (BENADRYL) 12.5 mg/5 mL elixir 10 mLs (25 mg total) by Per G Tube route 4 (four) times daily as needed for Allergies.    gabapentin (NEURONTIN) 250 mg/5 mL solution 5 mLs (250 mg total) by Per G Tube route nightly. At bedtime    Lactobacillus rhamnosus GG (CULTURELLE) 10 billion cell capsule 1 capsule by Per G Tube route once daily.    melatonin (MELATIN) 3 mg tablet 2 tablets (6 mg total) by Per G Tube route nightly as needed for Insomnia.    metroNIDAZOLE (FLAGYL) 500 MG tablet 1 tablet (500 mg total) by Per G Tube route 3 (three) times daily. End date 10/22/2020. for 13 days    multivitamin liquid no.118 Liqd 10 mLs by Per G Tube  route once daily.    PHENobarbitaL 20 mg/5 mL (4 mg/mL) Elix elixir 25 mLs (100 mg total) by Per G Tube route every evening.    propranoloL (INDERAL) 20 MG tablet 1 tablet (20 mg total) by Per G Tube route 3 (three) times daily.    vancomycin HCl (VANCOMYCIN 750 MG/250 ML D5W, READY TO MIX SYSTEM,) Inject 250 mLs (750 mg total) into the vein every 12 (twelve) hours. End date 10/22/2020 for 13 days     Family History     Problem Relation (Age of Onset)    Cancer Maternal Grandfather        Tobacco Use    Smoking status: Never Smoker    Smokeless tobacco: Never Used   Substance and Sexual Activity    Alcohol use: Never     Frequency: Never    Drug use: Not Currently    Sexual activity: Not on file     Review of Systems   Unable to perform ROS: Intubated     Objective:     Vital Signs (Most Recent):  Temp: (!) 103 °F (39.4 °C) (10/22/20 1612)  Pulse: (!) 117 (10/22/20 1711)  Resp: 18 (10/22/20 1701)  BP: 109/66 (10/22/20 1701)  SpO2: 100 % (10/22/20 1701) Vital Signs (24h Range):  Temp:  [102 °F (38.9 °C)-103 °F (39.4 °C)] 103 °F (39.4 °C)  Pulse:  [117-172] 117  Resp:  [18-50] 18  SpO2:  [77 %-100 %] 100 %  BP: (109-187)/() 109/66     Weight: 32.3 kg (71 lb 3.3 oz)  Body mass index is 15.41 kg/m².    Physical Exam  Vitals signs and nursing note reviewed.   Constitutional:       General: He is in acute distress.      Appearance: He is underweight. He is ill-appearing.      Interventions: He is sedated and intubated.   HENT:      Head: Normocephalic and atraumatic.   Eyes:      Pupils: Pupils are equal, round, and reactive to light.   Neck:      Musculoskeletal: Normal range of motion and neck supple.   Cardiovascular:      Rate and Rhythm: Normal rate and regular rhythm.      Pulses: Normal pulses.      Heart sounds: Normal heart sounds.   Pulmonary:      Effort: Pulmonary effort is normal. No tachypnea, accessory muscle usage or respiratory distress. He is intubated.      Breath sounds: Normal breath  sounds.   Abdominal:      General: Bowel sounds are normal.      Palpations: Abdomen is soft.      Tenderness: There is no abdominal tenderness.   Musculoskeletal: Normal range of motion.   Skin:     General: Skin is warm and dry.      Capillary Refill: Capillary refill takes less than 2 seconds.   Neurological:      Mental Status: He is unresponsive.      Comments: Unable to perform due to mental status             Significant Labs:   ABGs:   Recent Labs   Lab 10/22/20  1450   PH 7.429   PCO2 33.8*   HCO3 22.4*   POCSATURATED 98   BE -2     Blood Culture: No results for input(s): LABBLOO in the last 48 hours.  BMP:   Recent Labs   Lab 10/22/20  1458         K 4.1      CO2 20*   BUN 17   CREATININE 0.5   CALCIUM 9.0     CBC:   Recent Labs   Lab 10/22/20  1458   WBC 18.73*   HGB 11.2*   HCT 36.3*   *     CMP:   Recent Labs   Lab 10/22/20  1458      K 4.1      CO2 20*      BUN 17   CREATININE 0.5   CALCIUM 9.0   PROT 7.4   ALBUMIN 2.4*   BILITOT 0.1   ALKPHOS 114   AST 17   ALT 12   ANIONGAP 11   EGFRNONAA >60     Lactic Acid:   Recent Labs   Lab 10/22/20  1545   LACTATE 2.3*     Urine Studies:   Recent Labs   Lab 10/22/20  1653   COLORU Yellow   APPEARANCEUA Clear   PHUR 7.0   SPECGRAV 1.025   PROTEINUA Trace*   GLUCUA Negative   KETONESU Negative   BILIRUBINUA Negative   OCCULTUA Negative   NITRITE Negative   UROBILINOGEN Negative   LEUKOCYTESUR Negative     All pertinent labs within the past 24 hours have been reviewed.    Significant Imaging:   Imaging Results          X-Ray Chest AP Portable (Final result)  Result time 10/22/20 16:09:49    Final result by Martin Perez MD (10/22/20 16:09:49)                 Impression:      See findings above      Electronically signed by: Martin Perez MD  Date:    10/22/2020  Time:    16:09             Narrative:    EXAMINATION:  XR CHEST AP PORTABLE    CLINICAL HISTORY:  Encounter for other preprocedural  examination    FINDINGS:  Single view of the chest.  10/04/2020 comparison    Cardiac silhouette is normal.  Endotracheal tube appears appropriate..  Right-sided catheter tip overlies the SVC.  The lungs demonstrate no evidence of consolidation.  Mild atelectasis or infiltrate right perihilar region and right midlung zone.  No pneumothorax or pleural effusion .  Bones appear intact.                              Assessment/Plan:     Tachycardia  Due to infection  ABX  Symptomatic care  Monitor        Acute hypoxemic respiratory failure  Patient intubated / Sedated  Pulmonary CC  ICU  ABX  Wean as tolerated      Osteomyelitis of pelvis  ABX  Blood Cultures  Wound Care  ID on COnsult      Decubitus ulcer of ischial area, left, stage IV  Wound Care  Antibiotics  Vanc / Zosyn / Cipro  LT Care  PICC in place      Seizure disorder  Phenobarbital  Monitor  ICU  Telemetry  Stable at present      Cerebral palsy  History Of - Monitor        VTE Risk Mitigation (From admission, onward)         Ordered     IP VTE HIGH RISK PATIENT  Once      10/22/20 1754     Place sequential compression device  Until discontinued      10/22/20 1754               45 mins cc time    Barney Guerrero MD  Department of Hospital Medicine   [facility]

## 2020-10-22 NOTE — ED NOTES
"Spoke with patient's mother, Leni Moscoso at [phone]. Mother updated on patient condition of possible sepsis and intubation as requested by Dr. Miller. Mother states she is with her sick mother currently and can not come but requests "everythign be done" and she will come to see patient tomorrow am.   "

## 2020-10-22 NOTE — ASSESSMENT & PLAN NOTE
Recent flouroquinolone resistant seratia pneumonia  Empiric vanc, zosyn  Culture endotracheal aspirate  Monitor temp, wbc, culture data

## 2020-10-22 NOTE — ASSESSMENT & PLAN NOTE
Suspect baseline sinus tachycardia exacerbated by fever, sepsis  Holding enteral propanolol given marginal BP  Optimize analgesia, sedation

## 2020-10-22 NOTE — PROGRESS NOTES
Received pt from ER. Placed on  . All alarms functioning.  ET tube secured. No distress obs. Received report from [facility]

## 2020-10-22 NOTE — CONSULTS
[facility]   Critical Care Medicine  Consult Note    Patient Name: Glen Moscoso  MRN: [id number]  Admission Date: 10/22/2020  Hospital Length of Stay: 0 days  Code Status: Full Code  Attending Physician: Barney Guerrero MD   Primary Care Provider: Yadi Lawson MD   Principal Problem: Acute hypoxemic respiratory failure      Subjective:     HPI:  25 year old male with spastic cerebral palsy, severe contractures, seizure disorder, malnutrition  Presented to ED from LTAC when found face down, unresponsive, with hypoxia  OF NOTE - recent hospitalizations x 2 for pneumonia (serratia), second hospitalization complicated with osteomyelitis from sacral decubitus (underwent surgical I&D) and pneumoperitoneum from suspected colonic perforation that was managed conservatively    On arrival today respiratory rate 50, , sat 89%, SBP 170s, and fever 102  Intubated on ED arrival with anesthesia assistance  Labs revealed leukocytosis, lactic acid 2.3,and procalcitonin 0.12  CT chest abd pelvis revealed - Right lower lobe probable pneumonia.  Osseous erosion could appear similarly but is less favored.  Additional scattered areas of consolidation in the right lung likely also related to infection.  There is a large decubitus ulcer right greater than left. The right ulcer tracks to the posterior aspect of the right inferior pubic ramus. Osteomyelitis cannot be excluded.     Hospital/ICU Course:  Arrived to ICU with OETT to mechanical ventilation, propofol sedation    Past Medical History:   Diagnosis Date    Acid reflux     Anemia of chronic disease 8/30/2020    Cerebral palsy     Colitis     Decubitus ulcer of ischial area, left, stage IV 9/10/2020    History of hip surgery     Osteomyelitis of pelvis 9/24/2020    Pneumoperitoneum 9/21/2020    Pressure injury of right ischium, stage 4     S/P percutaneous endoscopic gastrostomy (PEG) tube placement     Seizure disorder 8/26/2020    Seizures      Severe protein-calorie malnutrition     Sinus tachycardia 8/4/2020       Past Surgical History:   Procedure Laterality Date    CHOLECYSTECTOMY      FLEXIBLE SIGMOIDOSCOPY N/A 9/9/2020    Procedure: SIGMOIDOSCOPY, FLEXIBLE;  Surgeon: America Goode MD;  Location: [facility] ([address] [place] [address]);  Service: Endoscopy;  Laterality: N/A;    HIP SURGERY         Review of patient's allergies indicates:  No Known Allergies    Family History     Problem Relation (Age of Onset)    Cancer Maternal Grandfather        Tobacco Use    Smoking status: Never Smoker    Smokeless tobacco: Never Used   Substance and Sexual Activity    Alcohol use: Never     Frequency: Never    Drug use: Not Currently    Sexual activity: Not on file         Review of Systems   Unable to perform ROS: Intubated     Objective:     Vital Signs (Most Recent):  Temp: (!) 102.6 °F (39.2 °C) (10/22/20 1732)  Pulse: (!) 122 (10/22/20 1732)  Resp: (!) 22 (10/22/20 1731)  BP: 112/69 (10/22/20 1732)  SpO2: 96 % (10/22/20 1732) Vital Signs (24h Range):  Temp:  [102 °F (38.9 °C)-103 °F (39.4 °C)] 102.6 °F (39.2 °C)  Pulse:  [117-172] 122  Resp:  [18-50] 22  SpO2:  [77 %-100 %] 96 %  BP: (108-187)/() 112/69     Weight: 32.3 kg (71 lb 3.3 oz)  Body mass index is 15.41 kg/m².      Intake/Output Summary (Last 24 hours) at 10/22/2020 1840  Last data filed at 10/22/2020 1706  Gross per 24 hour   Intake 1120 ml   Output --   Net 1120 ml       Physical Exam  Vitals signs and nursing note reviewed.   Constitutional:       Appearance: He is underweight. He is ill-appearing.      Interventions: He is sedated and intubated.   HENT:      Head: Atraumatic.   Eyes:      Conjunctiva/sclera: Conjunctivae normal.   Neck:      Musculoskeletal: No edema.      Comments: Severe bilateral upper and lower extremity contractures  Cardiovascular:      Rate and Rhythm: Regular rhythm. Tachycardia present.      Pulses:           Radial pulses are 1+ on the right side and 1+ on the left  side.        Dorsalis pedis pulses are 1+ on the right side and 1+ on the left side.   Pulmonary:      Effort: He is intubated.      Breath sounds: Decreased breath sounds present. No wheezing, rhonchi or rales.   Abdominal:      General: Bowel sounds are decreased. There is no distension.      Palpations: Abdomen is soft.      Comments: Scarring around button PEG   Musculoskeletal:      Right lower leg: No edema.      Left lower leg: No edema.   Skin:         Neurological:      GCS: GCS eye subscore is 3. GCS verbal subscore is 1. GCS motor subscore is 1.         Vents:  Vent Mode: A/C (10/22/20 1804)  Ventilator Initiated: Yes (10/22/20 1444)  Set Rate: 18 BPM (10/22/20 1804)  Vt Set: 325 mL (10/22/20 1804)  Pressure Support: 0 cmH20 (10/22/20 1804)  PEEP/CPAP: 5 cmH20 (10/22/20 1804)  Oxygen Concentration (%): 50 (10/22/20 1804)  Peak Airway Pressure: 21 cmH2O (10/22/20 1804)  Plateau Pressure: 0 cmH20 (10/22/20 1804)  Total Ve: 7.87 mL (10/22/20 1804)  F/VT Ratio<105 (RSBI): (!) 60.51 (10/22/20 1633)    Lines/Drains/Airways     Central Venous Catheter Line            Permacath 09/28/20 24 days          Drain                 Gastrostomy/Enterostomy 08/04/20 1653 Percutaneous endoscopic gastrostomy (PEG) feeding 79 days         Urethral Catheter 10/22/20 1653 Latex 16 Fr. less than 1 day          Airway                 Airway - Non-Surgical 10/22/20 1430 Endotracheal Tube less than 1 day          Peripheral Intravenous Line                 Midline Catheter Insertion/Assessment  - Single Lumen 10/22/20 1548 Right basilic vein (medial side of arm) 20g x 8cm less than 1 day                Significant Labs:    CBC/Anemia Profile:  Recent Labs   Lab 10/22/20  1458   WBC 18.73*   HGB 11.2*   HCT 36.3*   *   MCV 95   RDW 13.8        Chemistries:  Recent Labs   Lab 10/22/20  1458      K 4.1      CO2 20*   BUN 17   CREATININE 0.5   CALCIUM 9.0   ALBUMIN 2.4*   PROT 7.4   BILITOT 0.1   ALKPHOS 114   ALT  12   AST 17       All pertinent labs within the past 24 hours have been reviewed.    Significant Imaging:   I have reviewed all pertinent imaging results/findings within the past 24 hours.      ABG  Recent Labs   Lab 10/22/20  1450   PH 7.429   PO2 95   PCO2 33.8*   HCO3 22.4*   BE -2     Assessment/Plan:     Neuro  Seizure disorder  Continue home dose phenobarbital    Cerebral palsy  Sedated for mechanical ventilation  Resume baclofen, neurontin tomorrow pending hemodynamic stability    Derm  Decubitus ulcer of ischial area, left, stage IV  WOC to eval; wound care per recs    Pulmonary  Right lower lobe pneumonia  Recent flouroquinolone resistant seratia pneumonia  Empiric vanc, zosyn  Culture endotracheal aspirate  Monitor temp, wbc, culture data    Acute hypoxemic respiratory failure  Vent settings reviewed and adjusted to optimize gas exchange  VAP prophylaxis    Cardiac/Vascular  Tachycardia  Suspect baseline sinus tachycardia exacerbated by fever, sepsis  Holding enteral propanolol given marginal BP  Optimize analgesia, sedation    Endocrine  Severe protein-calorie malnutrition  TF per RD recs in am        Critical Care Daily Checklist:    A: Awake: RASS Goal/Actual Goal:    Actual:     B: Spontaneous Breathing Trial Performed?     C: SAT & SBT Coordinated?  Not candidate                       D: Delirium: CAM-ICU     E: Early Mobility Performed? Yes   F: Feeding Goal:    Status:     Current Diet Order   Procedures    Diet NPO      AS: Analgesia/Sedation Fentanyl, propofol   T: Thromboembolic Prophylaxis lovenox   H: HOB > 300 Yes   U: Stress Ulcer Prophylaxis (if needed) pepcid   G: Glucose Control monitor   B: Bowel Function     I: Indwelling Catheter (Lines & Iglesias) Necessity reviewed   D: De-escalation of Antimicrobials/Pharmacotherapies reviewed    Plan for the day/ETD As above    Code Status:  Family/Goals of Care: Full Code  Anticipate return to LTAC/SNF facility   I have discussed case and plan of  care in detail with Dr Garcia; Status and plan of care were discussed with team on multidisciplinary rounds.    Critical Care Time: 50 minutes  Critical secondary to Patient has a condition that poses threat to life and bodily function: Severe Respiratory Distress     Critical care was time spent personally by me on the following activities: development of treatment plan with patient or surrogate and bedside caregivers, discussions with consultants, evaluation of patient's response to treatment, examination of patient, ordering and performing treatments and interventions, ordering and review of laboratory studies, ordering and review of radiographic studies, pulse oximetry, re-evaluation of patient's condition. This critical care time did not overlap with that of any other provider or involve time for any procedures.    Thank you for your consult.      JEFF Panchal-BC  Critical Care Medicine  [facility]

## 2020-10-23 LAB
ALBUMIN SERPL BCP-MCNC: 1.8 G/DL (ref 3.5–5.2)
ALLENS TEST: ABNORMAL
ALP SERPL-CCNC: 162 U/L (ref 55–135)
ALT SERPL W/O P-5'-P-CCNC: 27 U/L (ref 10–44)
ANION GAP SERPL CALC-SCNC: 9 MMOL/L (ref 8–16)
AST SERPL-CCNC: 65 U/L (ref 10–40)
BASOPHILS # BLD AUTO: 0.03 K/UL (ref 0–0.2)
BASOPHILS # BLD AUTO: 0.03 K/UL (ref 0–0.2)
BASOPHILS NFR BLD: 0.2 % (ref 0–1.9)
BASOPHILS NFR BLD: 0.2 % (ref 0–1.9)
BILIRUB SERPL-MCNC: 0.2 MG/DL (ref 0.1–1)
BUN SERPL-MCNC: 9 MG/DL (ref 6–20)
CALCIUM SERPL-MCNC: 7.9 MG/DL (ref 8.7–10.5)
CHLORIDE SERPL-SCNC: 104 MMOL/L (ref 95–110)
CO2 SERPL-SCNC: 24 MMOL/L (ref 23–29)
CREAT SERPL-MCNC: 0.4 MG/DL (ref 0.5–1.4)
DELSYS: ABNORMAL
DIFFERENTIAL METHOD: ABNORMAL
DIFFERENTIAL METHOD: ABNORMAL
EOSINOPHIL # BLD AUTO: 0 K/UL (ref 0–0.5)
EOSINOPHIL # BLD AUTO: 0.3 K/UL (ref 0–0.5)
EOSINOPHIL NFR BLD: 0.1 % (ref 0–8)
EOSINOPHIL NFR BLD: 1.5 % (ref 0–8)
ERYTHROCYTE [DISTWIDTH] IN BLOOD BY AUTOMATED COUNT: 13.9 % (ref 11.5–14.5)
ERYTHROCYTE [DISTWIDTH] IN BLOOD BY AUTOMATED COUNT: 14.1 % (ref 11.5–14.5)
ERYTHROCYTE [SEDIMENTATION RATE] IN BLOOD BY WESTERGREN METHOD: 18 MM/H
EST. GFR  (AFRICAN AMERICAN): >60 ML/MIN/1.73 M^2
EST. GFR  (NON AFRICAN AMERICAN): >60 ML/MIN/1.73 M^2
FIO2: 30
GLUCOSE SERPL-MCNC: 103 MG/DL (ref 70–110)
HCO3 UR-SCNC: 24.8 MMOL/L (ref 24–28)
HCT VFR BLD AUTO: 24.1 % (ref 40–54)
HCT VFR BLD AUTO: 24.8 % (ref 40–54)
HGB BLD-MCNC: 7.4 G/DL (ref 14–18)
HGB BLD-MCNC: 7.8 G/DL (ref 14–18)
IMM GRANULOCYTES # BLD AUTO: 0.12 K/UL (ref 0–0.04)
IMM GRANULOCYTES # BLD AUTO: 0.14 K/UL (ref 0–0.04)
IMM GRANULOCYTES NFR BLD AUTO: 0.7 % (ref 0–0.5)
IMM GRANULOCYTES NFR BLD AUTO: 0.8 % (ref 0–0.5)
LYMPHOCYTES # BLD AUTO: 1.3 K/UL (ref 1–4.8)
LYMPHOCYTES # BLD AUTO: 1.7 K/UL (ref 1–4.8)
LYMPHOCYTES NFR BLD: 8.6 % (ref 18–48)
LYMPHOCYTES NFR BLD: 9 % (ref 18–48)
MAGNESIUM SERPL-MCNC: 1.6 MG/DL (ref 1.6–2.6)
MCH RBC QN AUTO: 28.9 PG (ref 27–31)
MCH RBC QN AUTO: 29.7 PG (ref 27–31)
MCHC RBC AUTO-ENTMCNC: 30.7 G/DL (ref 32–36)
MCHC RBC AUTO-ENTMCNC: 31.5 G/DL (ref 32–36)
MCV RBC AUTO: 94 FL (ref 82–98)
MCV RBC AUTO: 94 FL (ref 82–98)
MODE: ABNORMAL
MONOCYTES # BLD AUTO: 1.3 K/UL (ref 0.3–1)
MONOCYTES # BLD AUTO: 1.4 K/UL (ref 0.3–1)
MONOCYTES NFR BLD: 7.3 % (ref 4–15)
MONOCYTES NFR BLD: 8.8 % (ref 4–15)
NEUTROPHILS # BLD AUTO: 12.5 K/UL (ref 1.8–7.7)
NEUTROPHILS # BLD AUTO: 15.8 K/UL (ref 1.8–7.7)
NEUTROPHILS NFR BLD: 81.3 % (ref 38–73)
NEUTROPHILS NFR BLD: 81.5 % (ref 38–73)
NRBC BLD-RTO: 0 /100 WBC
NRBC BLD-RTO: 0 /100 WBC
PCO2 BLDA: 39.7 MMHG (ref 35–45)
PEEP: 5
PH SMN: 7.4 [PH] (ref 7.35–7.45)
PHOSPHATE SERPL-MCNC: 3 MG/DL (ref 2.7–4.5)
PLATELET # BLD AUTO: 442 K/UL (ref 150–350)
PLATELET # BLD AUTO: 517 K/UL (ref 150–350)
PMV BLD AUTO: 8.7 FL (ref 9.2–12.9)
PMV BLD AUTO: 8.8 FL (ref 9.2–12.9)
PO2 BLDA: 134 MMHG (ref 80–100)
POC BE: 0 MMOL/L
POC SATURATED O2: 99 % (ref 95–100)
POTASSIUM SERPL-SCNC: 3.5 MMOL/L (ref 3.5–5.1)
PROT SERPL-MCNC: 5.7 G/DL (ref 6–8.4)
RBC # BLD AUTO: 2.56 M/UL (ref 4.6–6.2)
RBC # BLD AUTO: 2.63 M/UL (ref 4.6–6.2)
SAMPLE: ABNORMAL
SITE: ABNORMAL
SODIUM SERPL-SCNC: 137 MMOL/L (ref 136–145)
VANCOMYCIN TROUGH SERPL-MCNC: 17.6 UG/ML (ref 10–22)
VT: 325
WBC # BLD AUTO: 15.31 K/UL (ref 3.9–12.7)
WBC # BLD AUTO: 19.36 K/UL (ref 3.9–12.7)

## 2020-10-23 PROCEDURE — 25000003 PHARM REV CODE 250: Performed by: NURSE PRACTITIONER

## 2020-10-23 PROCEDURE — 85025 COMPLETE CBC W/AUTO DIFF WBC: CPT

## 2020-10-23 PROCEDURE — 25000003 PHARM REV CODE 250: Performed by: EMERGENCY MEDICINE

## 2020-10-23 PROCEDURE — 63600175 PHARM REV CODE 636 W HCPCS: Performed by: NURSE PRACTITIONER

## 2020-10-23 PROCEDURE — 25000242 PHARM REV CODE 250 ALT 637 W/ HCPCS: Performed by: NURSE PRACTITIONER

## 2020-10-23 PROCEDURE — 97802 MEDICAL NUTRITION INDIV IN: CPT

## 2020-10-23 PROCEDURE — 80053 COMPREHEN METABOLIC PANEL: CPT

## 2020-10-23 PROCEDURE — 99291 PR CRITICAL CARE, E/M 30-74 MINUTES: ICD-10-PCS | Mod: ,,, | Performed by: NURSE PRACTITIONER

## 2020-10-23 PROCEDURE — 36600 WITHDRAWAL OF ARTERIAL BLOOD: CPT

## 2020-10-23 PROCEDURE — 20000000 HC ICU ROOM

## 2020-10-23 PROCEDURE — 94640 AIRWAY INHALATION TREATMENT: CPT

## 2020-10-23 PROCEDURE — 25000003 PHARM REV CODE 250: Performed by: INTERNAL MEDICINE

## 2020-10-23 PROCEDURE — 99900026 HC AIRWAY MAINTENANCE (STAT)

## 2020-10-23 PROCEDURE — 25000003 PHARM REV CODE 250: Performed by: SURGERY

## 2020-10-23 PROCEDURE — 99291 CRITICAL CARE FIRST HOUR: CPT | Mod: ,,, | Performed by: NURSE PRACTITIONER

## 2020-10-23 PROCEDURE — 83735 ASSAY OF MAGNESIUM: CPT

## 2020-10-23 PROCEDURE — 63600175 PHARM REV CODE 636 W HCPCS: Performed by: INTERNAL MEDICINE

## 2020-10-23 PROCEDURE — 63600175 PHARM REV CODE 636 W HCPCS: Performed by: EMERGENCY MEDICINE

## 2020-10-23 PROCEDURE — 84100 ASSAY OF PHOSPHORUS: CPT

## 2020-10-23 PROCEDURE — 82803 BLOOD GASES ANY COMBINATION: CPT

## 2020-10-23 PROCEDURE — 99900035 HC TECH TIME PER 15 MIN (STAT)

## 2020-10-23 PROCEDURE — 80202 ASSAY OF VANCOMYCIN: CPT

## 2020-10-23 PROCEDURE — 94003 VENT MGMT INPAT SUBQ DAY: CPT

## 2020-10-23 RX ORDER — SODIUM CHLORIDE FOR INHALATION 3 %
4 VIAL, NEBULIZER (ML) INHALATION EVERY 8 HOURS
Status: DISCONTINUED | OUTPATIENT
Start: 2020-10-23 | End: 2020-11-03 | Stop reason: HOSPADM

## 2020-10-23 RX ORDER — DEXTROSE MONOHYDRATE, SODIUM CHLORIDE, AND POTASSIUM CHLORIDE 50; 1.49; 9 G/1000ML; G/1000ML; G/1000ML
INJECTION, SOLUTION INTRAVENOUS CONTINUOUS
Status: DISCONTINUED | OUTPATIENT
Start: 2020-10-23 | End: 2020-10-23

## 2020-10-23 RX ORDER — GABAPENTIN 250 MG/5ML
250 SOLUTION ORAL NIGHTLY
Status: DISCONTINUED | OUTPATIENT
Start: 2020-10-23 | End: 2020-11-03 | Stop reason: HOSPADM

## 2020-10-23 RX ORDER — PROPRANOLOL HYDROCHLORIDE 20 MG/5ML
20 SOLUTION ORAL 3 TIMES DAILY
Status: DISCONTINUED | OUTPATIENT
Start: 2020-10-23 | End: 2020-10-25

## 2020-10-23 RX ORDER — POTASSIUM CHLORIDE 29.8 MG/ML
40 INJECTION INTRAVENOUS ONCE
Status: COMPLETED | OUTPATIENT
Start: 2020-10-23 | End: 2020-10-23

## 2020-10-23 RX ORDER — MAGNESIUM SULFATE HEPTAHYDRATE 40 MG/ML
2 INJECTION, SOLUTION INTRAVENOUS ONCE
Status: COMPLETED | OUTPATIENT
Start: 2020-10-23 | End: 2020-10-23

## 2020-10-23 RX ORDER — BACLOFEN 10 MG/1
10 TABLET ORAL 3 TIMES DAILY
Status: DISCONTINUED | OUTPATIENT
Start: 2020-10-23 | End: 2020-11-03 | Stop reason: HOSPADM

## 2020-10-23 RX ORDER — ENOXAPARIN SODIUM 100 MG/ML
30 INJECTION SUBCUTANEOUS EVERY 24 HOURS
Status: DISCONTINUED | OUTPATIENT
Start: 2020-10-23 | End: 2020-10-26

## 2020-10-23 RX ADMIN — ENOXAPARIN SODIUM 30 MG: 100 INJECTION SUBCUTANEOUS at 04:10

## 2020-10-23 RX ADMIN — MAGNESIUM SULFATE 2 G: 2 INJECTION INTRAVENOUS at 10:10

## 2020-10-23 RX ADMIN — IPRATROPIUM BROMIDE AND ALBUTEROL SULFATE 3 ML: .5; 3 SOLUTION RESPIRATORY (INHALATION) at 04:10

## 2020-10-23 RX ADMIN — IPRATROPIUM BROMIDE AND ALBUTEROL SULFATE 3 ML: .5; 3 SOLUTION RESPIRATORY (INHALATION) at 12:10

## 2020-10-23 RX ADMIN — PIPERACILLIN AND TAZOBACTAM 4.5 G: 4; .5 INJECTION, POWDER, LYOPHILIZED, FOR SOLUTION INTRAVENOUS; PARENTERAL at 03:10

## 2020-10-23 RX ADMIN — VANCOMYCIN HYDROCHLORIDE 750 MG: 750 INJECTION, POWDER, LYOPHILIZED, FOR SOLUTION INTRAVENOUS at 05:10

## 2020-10-23 RX ADMIN — PIPERACILLIN AND TAZOBACTAM 4.5 G: 4; .5 INJECTION, POWDER, LYOPHILIZED, FOR SOLUTION INTRAVENOUS; PARENTERAL at 08:10

## 2020-10-23 RX ADMIN — ACETAMINOPHEN 650 MG: 325 TABLET ORAL at 07:10

## 2020-10-23 RX ADMIN — PHENOBARBITAL 100 MG: 20 ELIXIR ORAL at 09:10

## 2020-10-23 RX ADMIN — POTASSIUM CHLORIDE 40 MEQ: 29.8 INJECTION, SOLUTION INTRAVENOUS at 10:10

## 2020-10-23 RX ADMIN — SODIUM CHLORIDE 30 MG/ML INHALATION SOLUTION 4 ML: 30 SOLUTION INHALANT at 04:10

## 2020-10-23 RX ADMIN — IPRATROPIUM BROMIDE AND ALBUTEROL SULFATE 3 ML: .5; 3 SOLUTION RESPIRATORY (INHALATION) at 07:10

## 2020-10-23 RX ADMIN — MUPIROCIN: 20 OINTMENT TOPICAL at 09:10

## 2020-10-23 RX ADMIN — MICONAZOLE NITRATE: 2 OINTMENT TOPICAL at 08:10

## 2020-10-23 RX ADMIN — BACLOFEN 10 MG: 10 TABLET ORAL at 08:10

## 2020-10-23 RX ADMIN — CIPROFLOXACIN 400 MG: 2 INJECTION, SOLUTION INTRAVENOUS at 06:10

## 2020-10-23 RX ADMIN — PROPRANOLOL HYDROCHLORIDE 20 MG: 20 SOLUTION ORAL at 08:10

## 2020-10-23 RX ADMIN — VANCOMYCIN HYDROCHLORIDE 1000 MG: 1 INJECTION, POWDER, LYOPHILIZED, FOR SOLUTION INTRAVENOUS at 04:10

## 2020-10-23 RX ADMIN — IPRATROPIUM BROMIDE AND ALBUTEROL SULFATE 3 ML: .5; 3 SOLUTION RESPIRATORY (INHALATION) at 03:10

## 2020-10-23 RX ADMIN — CHLORHEXIDINE GLUCONATE 0.12% ORAL RINSE 15 ML: 1.2 LIQUID ORAL at 08:10

## 2020-10-23 RX ADMIN — SODIUM CHLORIDE 250 ML: 0.9 INJECTION, SOLUTION INTRAVENOUS at 12:10

## 2020-10-23 RX ADMIN — Medication 150 MCG/HR: at 03:10

## 2020-10-23 RX ADMIN — MUPIROCIN: 20 OINTMENT TOPICAL at 08:10

## 2020-10-23 RX ADMIN — FAMOTIDINE 20 MG: 10 INJECTION INTRAVENOUS at 08:10

## 2020-10-23 RX ADMIN — PROPRANOLOL HYDROCHLORIDE 20 MG: 20 SOLUTION ORAL at 02:10

## 2020-10-23 RX ADMIN — DEXTROSE MONOHYDRATE, SODIUM CHLORIDE, AND POTASSIUM CHLORIDE: 50; 9; 1.49 INJECTION, SOLUTION INTRAVENOUS at 12:10

## 2020-10-23 RX ADMIN — GABAPENTIN 250 MG: 250 SUSPENSION ORAL at 09:10

## 2020-10-23 RX ADMIN — BACLOFEN 10 MG: 10 TABLET ORAL at 02:10

## 2020-10-23 RX ADMIN — PROPOFOL 5 MCG/KG/MIN: 10 INJECTION, EMULSION INTRAVENOUS at 07:10

## 2020-10-23 NOTE — PLAN OF CARE
CM spoke with pt's mother and SDM Leni Moscoso as well as grand-mother Marie Moscoso at bedside to complete initial assessment.  Pt and family live about 2 hours from [place].  Pt lived with Leni, Marie and great-aunt in between hospitalizations over the past 3 months.  Most recently pt was DC from [facility] for sepsis that was resolved and discharged to [facility] in [place] for long tern IV antibiotics with IV Vancomycin, Cefepime and po Flagyl until at least 10/22 for concern of underlying osteo per notes from previous admission.   Pt had been at [facility] for about 2 weeks.  Leni had concerns about the air conditioner blowing cold air on pt and his room being right next to the COVID pt's gatica.  Leni stated that if she could have those things resolved she was fine with pt returning.  Spoke with Ledy at [facility] in [place] who stated that pt can return if level of care is needed.  Per Ledy, pt may be able to go to [facility] for first accepting.  Leni stated that if pt could be closer to [place], they would prefer, but understands that LTAC's are limited and due to Medicaid as only insurance it is difficult to find accepting facility.   [facility] referrals sent out to LTACs closer to pt's home.  Per Leni, pt had caregivers at home through the state. Leni requested I update Elisa, provider nurse and state  Andreea.  Spoke to Elisa [phone] who is the provider nurse with [facility] and trains pt's caregivers for updates.  Also contacted pt's state  with Rayna Kerns (agency who's goal is to keep pt out of facilities and coordinate care that can be provided in home) Andreea Ovalle [phone].  Andreea verbalized concern that Leni did not understand need for trach and suggested that further education be provided.  Attending was speaking to pt as CM was leaving to discuss trach for a second time.  Updated white board with CM contact information  provided.  Transitional care folder given to caregiver, information on bedside delivery, [facility], discharge begins on admit pamphlet, and  advance directive information provided to caregiver.  Instructed to call CM with questions or concerns.       D/c plan: LTAC   Transport home: ambulance  PCP: Lawson  Preferred pharmacy: [facility]  Bedside delivery: facility disposition planned   [facility]:  active     10/23/20 1200   Discharge Assessment   Assessment Type Discharge Planning Assessment   Confirmed/corrected address and phone number on facesheet? Yes   Assessment information obtained from? Caregiver   Expected Length of Stay (days)   (TBD)   Communicated expected length of stay with patient/caregiver yes   Prior to hospitilization cognitive status: Unable to Assess   Prior to hospitalization functional status: Completely Dependent   Current cognitive status: Unable to Assess   Current Functional Status: Completely Dependent   Facility Arrived From: [facility] With facility resident   Able to Return to Prior Arrangements yes   Is patient able to care for self after discharge? No   Who are your caregiver(s) and their phone number(s)? Leni Moscoso, mother, [phone]   Patient's perception of discharge disposition other (comments)  (NICOLA)   Readmission Within the Last 30 Days previous discharge plan unsuccessful   If yes, most recent facility name: [facility]   Patient currently being followed by outpatient case management? Yes   Patient currently receives any other outside agency services? No   Equipment Currently Used at Home hospital bed;nebulizer;respiratory supplies;wheelchair;suction machine   Part D Coverage na   Do you have any problems affording any of your prescribed medications? No   Is the patient taking medications as prescribed? yes   Does the patient have transportation home? Yes   Transportation Anticipated other (see comments)  (ambulance)   Dialysis Name and  Scheduled days NA   Does the patient receive services at the [facility]? No   Discharge Plan A Long-term acute care facility (LTAC)   DME Needed Upon Discharge  respiratory supplies  (trach supplies)   Patient/Family in Agreement with Plan yes   Readmission Questionnaire   At the time of your discharge, did someone talk to you about what your health problems were? Yes   At the time of discharge, did someone talk to you about what to watch out for regarding worsening of your health problem? Yes   At the time of discharge, did someone talk to you about what to do if you experienced worsening of your health problem? Yes   At the time of discharge, did someone talk to you about which medication to take when you left the hospital and which ones to stop taking? Yes   At the time of discharge, did someone talk to you about when and where to follow up with a doctor after you left the hospital? Yes   What do you believe caused you to be sick enough to be re-admitted? pt arrrived from facility due to hypoxia   How often do you need to have someone help you when you read instructions, pamphlets, or other written material from your doctor or pharmacy? Always   Do you have problems taking your medications as prescribed? No   Do you have any problems affording any of  your prescribed medications? No   Do you have problems obtaining/receiving your medications? No   Does the patient have transportation to healthcare appointments? Yes   Living Arrangements house   Does the patient have family/friends to help with healtcare needs after discharge? yes   Does your caregiver provide all the help you need? Yes   If no, what kind of help do you need at home? NA   Are you currently feeling confused?   (NICOLA)   Are you currently having problems thinking?   (NICOLA)   Are you currently having memory problems?   (NICOLA)   Have you felt down, depressed, or hopeless? Unable to Assess   Have you felt little interest or pleasure in doing things?  Unable to assess   In the last 7 days, my sleep quality was:   (NICOLA)

## 2020-10-23 NOTE — SUBJECTIVE & OBJECTIVE
Interval History:     Review of Systems   Unable to perform ROS: Intubated     Objective:     Vital Signs (Most Recent):  Temp: 98.8 °F (37.1 °C) (10/23/20 0305)  Pulse: (!) 114 (10/23/20 1209)  Resp: 18 (10/23/20 1209)  BP: 99/65 (10/23/20 1200)  SpO2: 100 % (10/23/20 1209) Vital Signs (24h Range):  Temp:  [98.2 °F (36.8 °C)-103 °F (39.4 °C)] 98.8 °F (37.1 °C)  Pulse:  [108-172] 114  Resp:  [10-50] 18  SpO2:  [77 %-100 %] 100 %  BP: ()/() 99/65     Weight: 34.3 kg (75 lb 9.9 oz)  Body mass index is 16.36 kg/m².    Intake/Output Summary (Last 24 hours) at 10/23/2020 1253  Last data filed at 10/23/2020 0600  Gross per 24 hour   Intake 1870 ml   Output 610 ml   Net 1260 ml      Physical Exam  Vitals signs and nursing note reviewed.   Constitutional:       Appearance: Normal appearance.   HENT:      Head: Normocephalic and atraumatic.      Nose: Nose normal.   Eyes:      Extraocular Movements: Extraocular movements intact.      Conjunctiva/sclera: Conjunctivae normal.      Pupils: Pupils are equal, round, and reactive to light.   Cardiovascular:      Rate and Rhythm: Tachycardia present.      Pulses: Normal pulses.      Heart sounds: Normal heart sounds.   Pulmonary:      Breath sounds: No wheezing or rhonchi.      Comments: INTUBATED  Abdominal:      General: Abdomen is flat. Bowel sounds are normal.      Palpations: Abdomen is soft.   Musculoskeletal:         General: Deformity present.   Skin:     Comments: Bilateral ischial wounds , Stage  IV    Neurological:      General: No focal deficit present.      Mental Status: He is alert.   Psychiatric:      Comments: SEDATED ON VENT          Significant Labs:   BMP:   Recent Labs   Lab 10/23/20  0459         K 3.5      CO2 24   BUN 9   CREATININE 0.4*   CALCIUM 7.9*   MG 1.6     CBC:   Recent Labs   Lab 10/22/20  1458 10/23/20  0459   WBC 18.73* 19.36*   HGB 11.2* 7.8*   HCT 36.3* 24.8*   * 517*       Significant Imaging: I have  reviewed all pertinent imaging results/findings within the past 24 hours.

## 2020-10-23 NOTE — PROGRESS NOTES
[facility] Medicine  Progress Note    Patient Name: Glen Moscoso  MRN: [id number]  Patient Class: IP- Inpatient   Admission Date: 10/22/2020  Length of Stay: 1 days  Attending Physician: Smitha Smith MD  Primary Care Provider: Yadi Lawson MD        Subjective:     Principal Problem:Acute hypoxemic respiratory failure        HPI:  Glen Moscoso is a 25 y.o. male patient with a PMHx of cerebral palsy who presents to the Emergency Department for evaluation of respiratory distress which onset PTA. EMS reports pt was found face down on the pillow in nursing home. Pt arrives tachycardic and tachypneic. Patient recently discharge from [facility] where he was treated for respiratory failure and sepsis. [facility], Cefepime and Flagyl to treat pelvic osteomyelitis and resolving colitis. In the ED, WBCs 18.7K, Platelet 681, CO2 20, Lactate 2.3. UA negative. CXR revealed mild atelectasis or infiltrate right perihilar region and right midlung zone. Pt with worsening resp failure Subsequently pt was intubated. Sepsis protocol initiated in the ED including IV hydration. Hospital medicine called for admission. Patient placed in ICU. Patient is a full code, SDM mother, Leni Moscoso at [phone].     Overview/Hospital Course:  10/23  Remains intubated , enteral nutrition initiated. Continued treatment of sepsis due to aspiration pneumonia .due to poor air way protection .  Cultures are with negative growth to date. Temp Max 103 and wbc 19.30   Tachycardia was reported over night , follow up Echo EF 50 % normal diastolic function.  Case discussed with Dr Palma who has agreed for trach placement due to recurrent aspiration pneumonia.        Interval History:     Review of Systems   Unable to perform ROS: Intubated     Objective:     Vital Signs (Most Recent):  Temp: 98.8 °F (37.1 °C) (10/23/20 0305)  Pulse: (!) 114 (10/23/20 1209)  Resp:  18 (10/23/20 1209)  BP: 99/65 (10/23/20 1200)  SpO2: 100 % (10/23/20 1209) Vital Signs (24h Range):  Temp:  [98.2 °F (36.8 °C)-103 °F (39.4 °C)] 98.8 °F (37.1 °C)  Pulse:  [108-172] 114  Resp:  [10-50] 18  SpO2:  [77 %-100 %] 100 %  BP: ()/() 99/65     Weight: 34.3 kg (75 lb 9.9 oz)  Body mass index is 16.36 kg/m².    Intake/Output Summary (Last 24 hours) at 10/23/2020 1253  Last data filed at 10/23/2020 0600  Gross per 24 hour   Intake 1870 ml   Output 610 ml   Net 1260 ml      Physical Exam  Vitals signs and nursing note reviewed.   Constitutional:       Appearance: Normal appearance.   HENT:      Head: Normocephalic and atraumatic.      Nose: Nose normal.   Eyes:      Extraocular Movements: Extraocular movements intact.      Conjunctiva/sclera: Conjunctivae normal.      Pupils: Pupils are equal, round, and reactive to light.   Cardiovascular:      Rate and Rhythm: Tachycardia present.      Pulses: Normal pulses.      Heart sounds: Normal heart sounds.   Pulmonary:      Breath sounds: No wheezing or rhonchi.      Comments: INTUBATED  Abdominal:      General: Abdomen is flat. Bowel sounds are normal.      Palpations: Abdomen is soft.   Musculoskeletal:         General: Deformity present.   Skin:     Comments: Bilateral ischial wounds , Stage  IV    Neurological:      General: No focal deficit present.      Mental Status: He is alert.   Psychiatric:      Comments: SEDATED ON VENT          Significant Labs:   BMP:   Recent Labs   Lab 10/23/20  0459         K 3.5      CO2 24   BUN 9   CREATININE 0.4*   CALCIUM 7.9*   MG 1.6     CBC:   Recent Labs   Lab 10/22/20  1458 10/23/20  0459   WBC 18.73* 19.36*   HGB 11.2* 7.8*   HCT 36.3* 24.8*   * 517*       Significant Imaging: I have reviewed all pertinent imaging results/findings within the past 24 hours.      Assessment/Plan:      * Acute hypoxemic respiratory failure  Patient intubated / Sedated  Pulmonary CC  ICU  ABX  Wean as  tolerated      Right lower lobe pneumonia  Recurrent Aspiration Pneumonia  Trach placement -pending for 10/28/20 with Dr Palma      Tachycardia  Due to infection  ABX  Symptomatic care  Monitor        Osteomyelitis of pelvis  ABX  Blood Cultures- NGTD  Wound Care  ID on COnsult      Severe protein-calorie malnutrition  Enteral tube feeds     Continue supplements        Decubitus ulcer of ischial area, left, stage IV  Wound Care  Antibiotics  Vanc / Zosyn / Cipro  PICC in place      Seizure disorder  Phenobarbital  Neuro checks    Cerebral palsy  Supportive care       VTE Risk Mitigation (From admission, onward)         Ordered     enoxaparin injection 30 mg  Every 24 hours      10/23/20 0907     IP VTE HIGH RISK PATIENT  Once      10/22/20 1825     Place sequential compression device  Until discontinued      10/22/20 1754                Discharge Planning   ROCÍO:      Code Status: Full Code   Is the patient medically ready for discharge?:     Reason for patient still in hospital (select all that apply): Patient unstable               Critical care time spent on the evaluation and treatment of severe organ dysfunction, review of pertinent labs and imaging studies, discussions with consulting providers and discussions with patient/family: 40 minutes.      Smitha Smith MD  Department of Hospital Medicine   [facility]

## 2020-10-23 NOTE — NURSING
Pt received from ER via bed. Brief noted on pt and removed. Pt has diffuse redness/moisture associated dermatitis on groin/perineum and stage 4 noted to sacrum with gauze and abd pad with tape as dsg with yellow secretions noted. Wound appears pink/red with defined borders. Pt has rizvi in place as well as permacath to right CW and midline to upper right arm. Button PEG noted to LUQ. Pt currently receiving propofol and vancomycin infusions. Lungs sounds coarse in all fields. PERRLA present with corneal response. Pt also has cough. Pt currently on ventilator. Et tube secure at 22 cm at lips. All limbs contracted. Lower extremeties appear mildly edematous. Pt made comfortable in bed and oriented to room. Side rails up x3. Pt ST on monitor. YASMINE Sidhu NP aware. No new orders at this time. Report given to ROBBIN Conner. 4 eyes on skin performed at bedside.

## 2020-10-23 NOTE — PLAN OF CARE
Assumed care of patient after completing bedside handoff.  Lines, drips, monitor checked.  Physical assessment completed, immediate patient needs addressed.  Family to the bedside, updated by NP and .  Plan to trach Tuesday.

## 2020-10-23 NOTE — EICU
eICU Physician Virtual/Remote Brief Initial Evaluation Note    Chart, Labs and investigations reviewed.   Current medications reviewed.   Patient observed  Discussed with RN    25-year-old gentleman sent to ED from LTAC after found face down tele, unresponsive and hypoxic  History of spastic cerebral palsy, severe contractures, seizure disorder, malnutrition.  He has a PEG tube and a right side central venous catheter  Recent hospitalizations pneumonia ; 2nd hospitalization complicated by osteomyelitis from sacral decubitus and pneumoperitoneum which was treated conservatively .  He required intubation and was admitted to the intensive care unit.  He was started on vancomycin, ciprofloxacin and Zosyn.  He is sedated on fentanyl and propofol drips.  He received 1020 mL of LR in the ED which is full sepsis bolus for his size    DVT prophylaxis enoxaparin and SCDs  GI prophylaxis famotidine    /61, pulse 123, O2 sat 100 on ventilator, RR 18  Intubated, contractures, unresponsive to voice    WBC 18.7, hemoglobin 11 2, platelets 681, INR 2, APTT 27  Sodium 136, potassium 4.1, bicarb 20, glucose 109 BUN 17, creatinine 0 5 calcium 9.0, albumin 2.4  Troponin within normal limits  Lactate 1.7, decreased 2 3.  Procalcitonin 0.12  COVID negative  Urinalysis SG 1.025 pH 7, trace protein, otherwise negative  Blood cultures x2 sent  , pCO2 338, PO2 95 bicarb 224, O2 sat 98 on ventilator  Chest x-ray reviewed by me-right-sided CVP with tip in SVC, ETT with tape approximately 3 cm anika.  Mild infiltrate right base  EKG reviewed by me sinus tachycardia rate 170.  Read as old lateral wall MI but I do not agree.  Flat T-waves throughout  CT scan abdomen pelvis without contrast right lower lobe consolidation with air bronchograms, absent gallbladder, gastrostomy tube in stomach moderate liquid stool throughout colon, right-sided decubitus ulcer tracking to inferior pubic ramus      Acute hypoxic respiratory failure due  to pneumonia-continue ventilator support, antibiotics    Tachycardia -sepsis, possible hypovolemia.  Will give additional 500 mL saline bolus    Stage IV sacral decubitus with osteomyelitis-Continue antibiotics, wound care consult    Seizure disorder, continue phenobarbital        This report has been created through the use of M-Modal dictation software. Typographical and content errors may occur with this process. While efforts are made to detect and correct such errors, in some cases errors will persist. For this reason, wording in this document should be considered in the proper context and not strictly verbatim

## 2020-10-23 NOTE — PLAN OF CARE
Pt remains on vent with 30% fio2. O2 sats >95%. Pt contracted in all extremities. PERRL. Facial grimacing noted. Fentanyl gtt cont. for RASS -1. SBP WNL. ST 120s-140s on monitor. MD Bazan notified of Fentanyl titration for pain control. 500mL x1 and 250mL x1 NS bolus ordered and administered. D5 NS with 20mEq KCL ordered and infusing at 75mL/hr. MD Bazan aware HR remains in 120s-140s post IVFB. Afebrile. R chest DL PICC with no BR present on assessment. NP Vernell notified. Cathflow ordered and administered in each port. +BR noted on reassess. ML ESME +flush/BR. G button intact. No BM this shift. Iglesias to gravity with adequate UOP noted. Iglesias care completed. Stage IV sacral ulcer noted to kathia buttock. Wet to dry dressing change completed with NS/gauze/ ABD pad. Red rash noted to pts groin/perineum. Turned q2h. Floated heels.     Pts mother updated on POC/condition. Verbalized understanding.

## 2020-10-23 NOTE — PROGRESS NOTES
Pharmacokinetic Assessment Follow Up: IV Vancomycin    Vancomycin serum concentration assessment(s):    The trough level was drawn correctly and can be used to guide therapy at this time. The measurement is above the desired definitive target range of 15 to 20 mcg/mL.    Vancomycin Regimen Plan:    Change regimen to Vancomycin 750  mg IV every 12 hours with next serum trough concentration measured at 1630 prior to 3rd dose on 10/24    Drug levels (last 3 results):  Recent Labs   Lab Result Units 10/23/20  1516   Vancomycin-Trough ug/mL 17.6       Pharmacy will continue to follow and monitor vancomycin.    Please contact pharmacy at extension [phone] for questions regarding this assessment.    Thank you for the consult,   Elena Mccabe [facility]       Patient brief summary:  Glen Moscoso is a 25 y.o. male initiated on antimicrobial therapy with IV Vancomycin for treatment of Osteo, LRI, SSTI (sacral ulcer      Drug Allergies:   Review of patient's allergies indicates:  No Known Allergies    Actual Body Weight:   34.3 kg      Renal Function:   Estimated Creatinine Clearance: 139.3 mL/min (A) (based on SCr of 0.4 mg/dL (L)).,     Dialysis Method (if applicable):  No dialysis    CBC (last 72 hours):  Recent Labs   Lab Result Units 10/22/20  1458 10/23/20  0459 10/23/20  1348   WBC K/uL 18.73* 19.36* 15.31*   Hemoglobin g/dL 11.2* 7.8* 7.4*   Hematocrit % 36.3* 24.8* 24.1*   Platelets K/uL 681* 517* 442*   Gran% % 86.2* 81.3* 81.5*   Lymph% % 5.2* 9.0* 8.6*   Mono% % 7.8 7.3 8.8   Eosinophil% % 0.1 1.5 0.1   Basophil% % 0.1 0.2 0.2   Differential Method  Automated Automated Automated       Metabolic Panel (last 72 hours):  Recent Labs   Lab Result Units 10/22/20  1458 10/22/20  1653 10/23/20  0459   Sodium mmol/L 136  --  137   Potassium mmol/L 4.1  --  3.5   Chloride mmol/L 105  --  104   CO2 mmol/L 20*  --  24   Glucose mg/dL 109  --  103   Glucose, UA   --  Negative  --    BUN, Bld mg/dL 17  --  9   Creatinine mg/dL 0.5  --   0.4*   Albumin g/dL 2.4*  --  1.8*   Total Bilirubin mg/dL 0.1  --  0.2   Alkaline Phosphatase U/L 114  --  162*   AST U/L 17  --  65*   ALT U/L 12  --  27   Magnesium mg/dL  --   --  1.6   Phosphorus mg/dL  --   --  3.0       Vancomycin Administrations:  vancomycin given in the last 96 hours                     vancomycin in dextrose 5 % 1 gram/250 mL IVPB 1,000 mg (mg) 1,000 mg New Bag 10/23/20 1633     1,000 mg New Bag  0447     1,000 mg New Bag 10/22/20 1625                    Microbiologic Results:  Microbiology Results (last 7 days)       Procedure Component Value Units Date/Time    Blood culture x two cultures. Draw prior to antibiotics. [[id number]] Collected: 10/22/20 1530    Order Status: Completed Specimen: Blood from Peripheral, Upper Arm, Right Updated: 10/23/20 0515     Blood Culture, Routine No Growth to date    Narrative:      Aerobic and anaerobic    Blood culture x two cultures. Draw prior to antibiotics. [[id number]] Collected: 10/22/20 1545    Order Status: Completed Specimen: Blood from Peripheral, Upper Arm, Right Updated: 10/23/20 0515     Blood Culture, Routine No Growth to date    Narrative:      Aerobic and anaerobic    Culture, Respiratory with Gram Stain [[id number]]     Order Status: No result Specimen: Respiratory from Endotracheal Aspirate

## 2020-10-23 NOTE — SUBJECTIVE & OBJECTIVE
Review of Systems   Unable to perform ROS: Intubated     Objective:     Vital Signs (Most Recent):  Temp: 98.8 °F (37.1 °C) (10/23/20 0305)  Pulse: (!) 115 (10/23/20 1000)  Resp: (!) 34 (10/23/20 1000)  BP: 112/75 (10/23/20 1000)  SpO2: 100 % (10/23/20 1000) Vital Signs (24h Range):  Temp:  [98.2 °F (36.8 °C)-103 °F (39.4 °C)] 98.8 °F (37.1 °C)  Pulse:  [115-172] 115  Resp:  [10-50] 34  SpO2:  [77 %-100 %] 100 %  BP: (102-187)/() 112/75     Weight: 34.3 kg (75 lb 9.9 oz)  Body mass index is 16.36 kg/m².      Intake/Output Summary (Last 24 hours) at 10/23/2020 1054  Last data filed at 10/23/2020 0600  Gross per 24 hour   Intake 1870 ml   Output 610 ml   Net 1260 ml      fentanyl 150 mcg/hr (10/23/20 0600)    propofoL 5 mcg/kg/min (10/23/20 1044)       Physical Exam  Vitals signs and nursing note reviewed.   Constitutional:       Appearance: He is underweight. He is ill-appearing.      Interventions: He is sedated and intubated.   HENT:      Head: Atraumatic.   Eyes:      Conjunctiva/sclera: Conjunctivae normal.   Neck:      Musculoskeletal: No edema.      Comments: Severe bilateral upper and lower extremity contractures  Cardiovascular:      Rate and Rhythm: Regular rhythm. Tachycardia present.      Pulses:           Radial pulses are 1+ on the right side and 1+ on the left side.        Dorsalis pedis pulses are 1+ on the right side and 1+ on the left side.   Pulmonary:      Effort: He is intubated.      Breath sounds: Decreased breath sounds present. No wheezing, rhonchi or rales.   Abdominal:      General: Bowel sounds are decreased. There is no distension.      Palpations: Abdomen is soft.      Comments: Scarring around button PEG   Musculoskeletal:      Right lower leg: No edema.      Left lower leg: No edema.   Skin:         Neurological:      GCS: GCS eye subscore is 3. GCS verbal subscore is 1. GCS motor subscore is 1.         Vents:  Vent Mode: Spont (10/23/20 1000)  Ventilator Initiated: Yes  (10/22/20 1444)  Set Rate: 0 BPM (10/23/20 1000)  Vt Set: 325 mL (10/23/20 1000)  Pressure Support: 12 cmH20 (10/23/20 1000)  PEEP/CPAP: 5 cmH20 (10/23/20 1000)  Oxygen Concentration (%): 30 (10/23/20 1000)  Peak Airway Pressure: 17 cmH2O (10/23/20 1000)  Plateau Pressure: 0 cmH20 (10/23/20 1000)  Total Ve: 5.85 mL (10/23/20 1000)  F/VT Ratio<105 (RSBI): (!) 89.95 (10/23/20 1000)    Lines/Drains/Airways     Peripherally Inserted Central Catheter Line            PICC Double Lumen 10/22/20 1300 less than 1 day          Drain                 Gastrostomy/Enterostomy 08/04/20 1653 Percutaneous endoscopic gastrostomy (PEG) feeding 79 days         Gastrostomy/Enterostomy 10/22/20 1300 LUQ less than 1 day         Urethral Catheter 10/22/20 1653 Latex 16 Fr. less than 1 day          Airway                 Airway - Non-Surgical 10/22/20 1430 Endotracheal Tube less than 1 day          Peripheral Intravenous Line                 Midline Catheter Insertion/Assessment  - Single Lumen 10/22/20 1548 Right basilic vein (medial side of arm) 20g x 8cm less than 1 day                Significant Labs:    CBC/Anemia Profile:  Recent Labs   Lab 10/22/20  1458 10/23/20  0459   WBC 18.73* 19.36*   HGB 11.2* 7.8*   HCT 36.3* 24.8*   * 517*   MCV 95 94   RDW 13.8 14.1        Chemistries:  Recent Labs   Lab 10/22/20  1458 10/23/20  0459    137   K 4.1 3.5    104   CO2 20* 24   BUN 17 9   CREATININE 0.5 0.4*   CALCIUM 9.0 7.9*   ALBUMIN 2.4* 1.8*   PROT 7.4 5.7*   BILITOT 0.1 0.2   ALKPHOS 114 162*   ALT 12 27   AST 17 65*   MG  --  1.6   PHOS  --  3.0       All pertinent labs within the past 24 hours have been reviewed.    Significant Imaging:   I have reviewed all pertinent imaging results/findings within the past 24 hours.

## 2020-10-23 NOTE — PLAN OF CARE
Recommendations    Recommendation:   1. Initiate enteral nutrition: Peptamen 1.5 w/prebio @ 45mL/hr. Initiate at 20mL/hr and progress to 45 per tolerance. Water flushes of 100mL q4 or per MD/NP.   Provides 1620kcals, 73g protein, 834mL free water.   2. RD to f/u    Goals: Meet >75% EEN/EPN by RD f/u  Nutrition Goal Status: new  Communication of RD Recs: discussed on rounds, POC, sticky note

## 2020-10-23 NOTE — ASSESSMENT & PLAN NOTE
Recent flouroquinolone resistant seratia pneumonia  Empiric vanc, zosyn  Culture endotracheal aspirate  Monitor temp, wbc, culture data  Recommend trach placement for airway protection from recurrent aspiration pneumonias; discussed with mother and answered all questions. Consulted ENT and spoke with Dr Palma who will plan for trach placement on Tuesday 10/27/20 at 8am

## 2020-10-23 NOTE — ASSESSMENT & PLAN NOTE
Transferred to LTAC on 10/10 with plan for IV abx and recs to continue until 10/22  Currently on abx coverage for aspiration pneumonia   If concern for ongoing osteo, will likely need MRI

## 2020-10-23 NOTE — PROGRESS NOTES
[facility] Medicine  Progress Note    Patient Name: Glen Moscoso  MRN: [id number]  Patient Class: IP- Inpatient   Admission Date: 10/22/2020  Length of Stay: 1 days  Attending Physician: Smitha Smith MD  Primary Care Provider: Yadi Lawson MD        Subjective:     Principal Problem:Acute hypoxemic respiratory failure        HPI:  Glen Moscoso is a 25 y.o. male patient with a PMHx of cerebral palsy who presents to the Emergency Department for evaluation of respiratory distress which onset PTA. EMS reports pt was found face down on the pillow in nursing home. Pt arrives tachycardic and tachypneic. Patient recently discharge from [facility] where he was treated for respiratory failure and sepsis. [facility], Cefepime and Flagyl to treat pelvic osteomyelitis and resolving colitis. In the ED, WBCs 18.7K, Platelet 681, CO2 20, Lactate 2.3. UA negative. CXR revealed mild atelectasis or infiltrate right perihilar region and right midlung zone. Pt with worsening resp failure Subsequently pt was intubated. Sepsis protocol initiated in the ED including IV hydration. Hospital medicine called for admission. Patient placed in ICU. Patient is a full code, SDM mother, Leni Moscoso at [phone].     Overview/Hospital Course:  10/23  Remains intubated , enteral nutrition initiated. Continued treatment of sepsis due to aspiration pneumonia .due to poor air way protection .  Cultures are with negative growth to date. Temp Max 103 and wbc 19.30   Tachycardia was reported over night , follow up Echo EF 50 % normal diastolic function.  Case discussed with Dr Palma who has agreed for trach placement due to recurrent aspiration pneumonia.        No new subjective & objective note has been filed under this hospital service since the last note was generated.      Assessment/Plan:      * Acute hypoxemic respiratory failure  Patient intubated /  Sedated  Pulmonary CC  ICU  ABX  Wean as tolerated      Right lower lobe pneumonia  Recurrent Aspiration Pneumonia  Trach placement -pending for 10/28/20 with Dr Palma      Tachycardia  Due to infection  ABX  Symptomatic care  Monitor        Osteomyelitis of pelvis  ABX  Blood Cultures- NGTD  Wound Care  ID on COnsult      Severe protein-calorie malnutrition  Enteral tube feeds     Continue supplements        Decubitus ulcer of ischial area, left, stage IV  Wound Care  Antibiotics  Vanc / Zosyn / Cipro  PICC in place      Seizure disorder  Phenobarbital  Neuro checks    Cerebral palsy  Supportive care       VTE Risk Mitigation (From admission, onward)         Ordered     enoxaparin injection 30 mg  Every 24 hours      10/23/20 0907     IP VTE HIGH RISK PATIENT  Once      10/22/20 1825     Place sequential compression device  Until discontinued      10/22/20 1754                Discharge Planning   ROCÍO:      Code Status: Full Code   Is the patient medically ready for discharge?:     Reason for patient still in hospital (select all that apply): Patient unstable               Critical care time spent on the evaluation and treatment of severe organ dysfunction, review of pertinent labs and imaging studies, discussions with consulting providers and discussions with patient/family: 40 minutes.      Smitha Smith MD  Department of Hospital Medicine   [facility]

## 2020-10-23 NOTE — ASSESSMENT & PLAN NOTE
Vent settings reviewed and adjusted to optimize gas exchange  VAP prophylaxis  10/23 - decreased oxygen demand and lung mechanics today; strong suspicion for inability to protect airway + thick secretions, will add hypertonic saline nebs and maintain intubation today

## 2020-10-23 NOTE — ASSESSMENT & PLAN NOTE
Nutrition Problem:  Severe Protein-Calorie Malnutrition  Malnutrition in the context of Chronic Illness/Injury    Related to (etiology):  Physiological causes resulting in anorexia or diminished intake    Signs and Symptoms (as evidenced by):  Body Fat Depletion: mild depletion of orbitals   Muscle Mass Depletion: mild and moderate depletion of temples and clavicle region   Hx of cerebral palsy, malnutrition, wounds    Interventions(treatment strategy):  Enteral nutrition    Nutrition Diagnosis Status:  Continues

## 2020-10-23 NOTE — CONSULTS
10/23/20 1015   Handoff Report   Given To ROBBIN Liu   Skin   Skin WDL ex   Skin Color/Characteristics redness blanchable   Skin Temperature warm   Skin Moisture dry   Skin Integrity wound;rash   Jerry Risk Assessment   Sensory Perception 2-->very limited   Moisture 3-->occasionally moist   Activity 1-->bedfast   Mobility 1-->completely immobile   Nutrition 2-->probably inadequate   Friction and Shear 2-->potential problem   Jerry Score 11        Altered Skin Integrity 10/23/20 Right Ischial tuberosity Full thickness tissue loss with exposed bone, tendon, or muscle. Often includes undermining and tunneling. May extend into muscle and/or supporting structures.   Date First Assessed: 10/23/20   Altered Skin Integrity Present on Admission: yes  Side: Right  Location: Ischial tuberosity  Description of Altered Skin Integrity: Full thickness tissue loss with exposed bone, tendon, or muscle. Often includes undermi...   Wound Image    Description of Altered Skin Integrity Full thickness tissue loss with exposed bone, tendon, or muscle. Often includes undermining and tunneling. May extend into muscle and/or supporting structures.   Dressing Appearance Intact;Moist drainage   Drainage Amount Small   Drainage Characteristics/Odor Serous   Appearance Red;Maroon;Moist;Muscle;Bone;Granulating   Tissue loss description Full thickness   Periwound Area Intact   Wound Edges Open   Wound Length (cm) 5 cm   Wound Width (cm) 7 cm   Wound Depth (cm) 3 cm   Wound Volume (cm^3) 105 cm^3   Wound Surface Area (cm^2) 35 cm^2   Undermining (depth (cm)/location) 4cm from 9-1 o'clock   Care Cleansed with:;Sterile normal saline;Applied:;Skin Barrier   Dressing Hydrofiber;Silver;Hydrocolloid   Packing other (see comments)  (aquacel AG extra)   Packing Inserted  2   Periwound Care Skin barrier film applied;Absorptive dressing applied;Dry periwound area maintained   Dressing Change Due 10/25/20        Altered Skin Integrity 10/23/20 Left  Ischial tuberosity Full thickness tissue loss with exposed bone, tendon, or muscle. Often includes undermining and tunneling. May extend into muscle and/or supporting structures.   Date First Assessed: 10/23/20   Altered Skin Integrity Present on Admission: yes  Side: Left  Location: Ischial tuberosity  Description of Altered Skin Integrity: Full thickness tissue loss with exposed bone, tendon, or muscle. Often includes undermin...   Wound Image    Description of Altered Skin Integrity Full thickness tissue loss with exposed bone, tendon, or muscle. Often includes undermining and tunneling. May extend into muscle and/or supporting structures.   Dressing Appearance Intact;Moist drainage   Drainage Amount Small   Drainage Characteristics/Odor Serous   Appearance Red;Muscle;Bone;Granulating   Tissue loss description Full thickness   Periwound Area Intact   Wound Edges Open   Wound Length (cm) 4.5 cm   Wound Width (cm) 5 cm   Wound Depth (cm) 3 cm   Wound Volume (cm^3) 67.5 cm^3   Wound Surface Area (cm^2) 22.5 cm^2   Undermining (depth (cm)/location) 4cm from 9-3 o'clock   Care Cleansed with:;Sterile normal saline;Applied:;Skin Barrier   Dressing Hydrofiber;Silver;Hydrocolloid   Packing   (aquacel ag extra)   Packing Inserted  1   Dressing Change Due 10/25/20        Altered Skin Integrity 10/23/20 Right Popliteal Intertrigo   Date First Assessed: 10/23/20   Altered Skin Integrity Present on Admission: yes  Side: Right  Location: Popliteal  Primary Wound Type: Intertrigo   Wound Image    Dressing Appearance Open to air   Drainage Amount Scant   Drainage Characteristics/Odor Serous   Appearance Red;Moist   Tissue loss description Partial thickness   Periwound Area Satellite lesion;Redness;Denuded   Wound Edges Irregular   Care Cleansed with:;Sterile normal saline;Applied:;Skin Barrier   Dressing   (INTERDRY)   Dressing Change Due 10/28/20        Altered Skin Integrity 10/23/20 Left Popliteal Intertrigo   Date First  Assessed: 10/23/20   Altered Skin Integrity Present on Admission: yes  Side: Left  Location: Popliteal  Primary Wound Type: Intertrigo   Dressing Appearance Open to air   Drainage Amount None   Appearance Red;Moist   Tissue loss description Partial thickness   Periwound Area Satellite lesion;Redness;Denuded   Wound Edges Irregular   Care Cleansed with:;Sterile normal saline;Applied:;Skin Barrier   Dressing   (INTERDRY)   Dressing Change Due 10/28/20        Altered Skin Integrity 10/23/20  Groin Medical adhesive related skin injury   Date First Assessed: 10/23/20   Altered Skin Integrity Present on Admission: yes  Side: (c)   Location: Groin  Primary Wound Type: (c) Medical adhesive related skin injury   Dressing Appearance Open to air   Drainage Amount None   Appearance Red;Moist   Tissue loss description Partial thickness   Periwound Area Satellite lesion;Redness;Denuded   Wound Edges Irregular   Care Cleansed with:;Soap and water;Applied:;Skin Barrier   Skin Interventions   Device Skin Pressure Protection pressure points protected;skin-to-device areas padded;absorbent pad utilized/changed   Pressure Reduction Devices pressure-redistributing mattress utilized;positioning supports utilized;heel offloading device utilized;foam padding utilized   Pressure Reduction Techniques frequent weight shift encouraged;heels elevated off bed;positioned off wounds   Skin Protection adhesive use limited;incontinence pads utilized;skin sealant/moisture barrier applied;tubing/devices free from skin contact     Consulted on this 24 y/o M patient due to present on admission skin breakdown. Patient is awake and alert, intubated. PMH significant for spastic cerebral palsy, severe contractures, seizure disorder, malnutrition. He was admitted to [facility] from [facility] due to aspiration pneumonia, respiratory failure where he was being treated for osteomyelitis from bilateral ischium pressure injuries (s/p surgical debridement at prior  "hospitalization). Skin assessment completed with CARTER Anthony RN wound care, RICARDO Clinton CNA, and primary nurse ALLEN Figueroa RN.  ETT noted, securement device in use. Scab to lower lip noted. Left upper ear fold with nonblanchable redness noted, painted with cavilon and left SHAWANDA. Right upper ear fold intact.  PEG tube site intact, mild erythema surrounding. nonbordered foam cut with T-slit and applied.  Right hand contracture noted, nonblanchable redness noted to lateral hand proximal to thumb, and scabs to lateral hand proximal to 5th finger.  MASD with yeast noted to bilateral groins, scrotum, medial thighs, bilateral hips, lower back regions with erythema, scattered areas of denuded skin, and satellite lesions. Cleansed all with bathing wipes at this time. Recommend aloe vesta AF moisture barrier applied BID and prn. Bilateral popliteal regions with intertrigo noted (legs contracted in "frog leg" formation) R>L with redness, denuded skin, satellite lesions. Cleansed with saline and patted dry. INTERDRY applied to knee folds.  Bilateral heels intact with no redness or breakdown.   Turned with max assistance to left side.   Sacrum intact with blanchable redness, painted with cavilon and preventative sacral foam dressing applied.  Right Ischium stage 4 pressure injury noted that measures [id number] with 4cm undermining noted form 9-1 o'clock. Wound bed is moist, red tissue - muscle and small area visible bone, with small amount granulation tissue noted.   Left Ischium stage 4 pressure injury noted that measures [id number] with 4cm undermining noted from 9-3 o'clock. Wound be dis moist, red tissue - muscle and small area of visible bone, with small amount granulation tissue noted.  Both wounds cleansed with saline and patted dry. Periwound skin painted with cavilon, then wounds Filled with aquacel AG extra and secured with duoderms.   Patient tolerated care well. Positioned with pillows.  May need to consider wound vac to " bilateral ischium wounds pending hopsital course and goals of care. Will re-assess next week.  Please see below for wound care recommendations:    Specialty SizeWise GRAY IMMERSE bed ordered at this time.  Turn q2 hours with pillow support (do not recommend foam wedge due to size/contractures)  Float heels with pillows (do not recommend heel boots due to deformity)  Limit the amount of linen/underpad between patient and mattress surface to ONE fitted sheet and ONE covidien incontinence pad - NO DIAPERS  Dietary consult for increased nutritional needs (completed)      Bilateral Ischium Stage 4 pressure injuries:   1. Cleanse with saline  2. Pat dry  3. Paint rodrigo wound skin with cavilon  4. Fill with aquacel AG extra (1.5 pieces for right; 1 piece for left)  5. Secure with duoderms  6. Change every other day and prn excess drainage    MASD to bilateral groins, scrotum, hips, lower back:  1. Cleanse with bath wipes gently  2. Apply thin layer aloe vesta AF moisture barrier BID per MAR  3. Limit the amount of linen/underpad between patient and mattress surface to ONE fitted sheet and ONE covidien incontinence pad - NO DIAPERS    Intertrigo to bilateral popliteal:  1. Cleanse with bath wipes gently  2. Pat dry  3. Apply InterDry into folds after hygiene care daily  4. Change InterDry sheets every 5 days prn

## 2020-10-23 NOTE — HOSPITAL COURSE
10/23  Remains intubated , enteral nutrition initiated. Continued treatment of sepsis due to aspiration pneumonia .due to poor air way protection .  Cultures are with negative growth to date. Temp Max 103 and wbc 19.30   Tachycardia was reported over night , follow up Echo EF 50 % normal diastolic function.  Case discussed with Dr Palma who has agreed for trach placement due to recurrent aspiration pneumonia.    10/24    Remains intubated, Tmax of 101.5 .CT Abd/pelvis - right inferior pubic ramus -cannot exclude     Osteomyelitis.      10/25   Self extubated, presently on Room air    Case discussed with Dr Palma who has agreed for trach placement due to recurrent aspiration pneumonia.  10/26- Afebrile. Remains on RA with satisfactory SpO2. Trach placement planned for tomorrow . Wound vac placed to Rt./Lt ischium stage IV pressure ulcers with full thickness tissue loss with exposed bone. General Surgery consulted  to evaluate pt for diverting colostomy to prevent fecal contamination of wound . Rectal tube in place. Labs- WBC normalize 10.1, Hbg 9.2, Pl 512, creatinine 0.5   10/27- S/P tracheostomy placement today. Remains sedated on mechanical ventilation via  trach . Labs are unremarkable . General Surgery is recommending to continue wound vac and rectal tube for now , diverting colostomy is not suggested at this time.   10/28- Tmax 99. Transition to trach collar . SpO2 100% on O2 at 5 L/min . No distress noted . Per nursing staff liquid stool sips around Rectal tube . Wound vac contaminated with liquid stool . Spoke to Dr. Ortez and he will evaluate pt for diverting colostomy . ID consult obtained and recs are noted. Add Questran powder for loose stool. Labs are unremarkable.   10/29- Afebrile . Trach collar in place. Patient to go to OR for diverting colostomy today. Antibiotic continues.   10/30- s/p Laparoscopic sigmoid loop colostomy construction performed yesterday. Leakage noted around G-tube . Discussed with  Dr. Ortez and recommend Fluro gastric tube study . Hold PEG feeding for now.   10/31- Afebrile . Appears tachypnic . Trach collar in place . CXR this morning resulted clear lungs . SpO2 is satisfactory on FiO2 21%.  Harsha button replaced with a new G tube yesterday by IR due to leakage. Per IR, the G tube may be exchanged for a harsha button at a future date . Abdomen appears distended , however good bowel sounds appreciated . LLQ colostomy stoma is beefy red with dark  liquid noted in the colostomy bag. Antibiotic continues for stage IV decubitus ulcer and presumed  Osteomyelitis. Disposition - [facility] once colostomy functions and output begins.  11/1- Abdomen is markedly distended . X-ray abd showed severe gaseous distention of the colon . Dr. Barry performed Iglesias catheter decompression with improvement of distension. No stool output noted . Per Surgery Ok to continue tube feed. Pt was noted to be tachycardic , tachypnic possibly due to discomfort from abdominal distention. Labs resulted persistent hypokalemia and metabolic acidosis with elevated BUN. Replete electrolytes and volume. Leukocytosis is noted which  likely reactive. Antibiotic continues for stage IV decubitus ulcer and presumed  Osteomyelitis. Disposition - [facility] once colostomy functions and output begins.  11/2 - Patient with good output from ostomy. Plan for LTAC in progress. Hgb stable 7.8. Leukocytosis resolved. Discussed with care team.   Patient with resumption of bowel function appropriate for LTAC. Patient stable at time of discharge.

## 2020-10-23 NOTE — CONSULTS
"  [facility]  Adult Nutrition  Consult Note    SUMMARY     Recommendations    Recommendation:   1. Initiate enteral nutrition: Peptamen 1.5 w/prebio @ 45mL/hr. Initiate at 20mL/hr and progress to 45 per tolerance. Water flushes of 100mL q4 or per MD/NP.   Provides 1620kcals, 73g protein, 834mL free water.   2. RD to f/u    Goals: Meet >75% EEN/EPN by RD f/u  Nutrition Goal Status: new  Communication of RD Recs: discussed on rounds, POC, sticky note    Reason for Assessment    Reason For Assessment: consult  Diagnosis: Pnuemonia, resp failure  Relevant Medical History: spastic cerebral palsy, colitis, severe contractures, seizure disorder, malnutrition, pnuemoperioneum, suspected colon perforation, GERD,   Interdisciplinary Rounds: attended    General Information Comments: RD consulted for TF recs. Pt was found down at his care facility and brought to [facility] last night. He was recently hospitalized with pnuemonia and pressure injuries and was seen by an RD and diagnosed as malnourished. He has a PEG tube, and rec above is for formula well tolerated at last admit. NFPE- pt remains malnourished, documented in greater detail below.     Nutrition Discharge Planning: PEG feeding    Nutrition Risk Screen    Nutrition Risk Screen: dysphagia or difficulty swallowing, tube feeding or parenteral nutrition, large or nonhealing wound, burn or pressure injury    Nutrition/Diet History    Food Allergies: NKFA  Factors Affecting Nutritional Intake: NPO, on mechanical ventilation  Nutrition Support Formula Prior to Admit: Peptamen 1.5 Prebio  Nutrition Support Rate Prior to Admit: 40 (ml)  Nutrtion Support Frequency Prior to Admit: continuous    Anthropometrics    Temp: 98.8 °F (37.1 °C)  Height Method: Estimated  Height: 4' 9" (144.8 cm)  Height (inches): 57 in  Weight Method: Bed Scale  Weight: 34.3 kg (75 lb 9.9 oz)  Weight (lb): 75.62 lb  Ideal Body Weight (IBW), Male: 88 lb  % Ideal Body Weight, Male (lb): " 80.42 %  BMI (Calculated): 16.4  BMI Grade: 16 - 16.9 protein-energy malnutrition grade II     Lab/Procedures/Meds  Pertinent Labs: reviewed  Albumin 1.8, Cr .4,   Pertinent Medications: reviewed  Pepcid, propranolol, NaCl   Physical Findings/Assessment     Severe muscle contracture and pressure injuries    Estimated/Assessed Needs    Weight Used For Calorie Calculations: 34.3 kg (75 lb 9.9 oz)(actual weight)  Energy Calorie Requirements (kcal): 1650  Energy Need Method: [facility]  Protein Requirements: 40- 55g(1.2- 1.5g/kg per malnutrition)  Weight Used For Protein Calculations: 34.3 kg (75 lb 9.9 oz)     Estimated Fluid Requirement Method: RDA Method(or per MD)  RDA Method (mL): 1650     Nutrition Prescription Ordered    Current Diet Order: NPO    Evaluation of Received Nutrient/Fluid Intake    Other Calories (kcal): 27(Propofol @ 1mL/hr)    Intake/Output Summary (Last 24 hours) at 10/23/2020 0900  Last data filed at 10/23/2020 0600  Gross per 24 hour   Intake 1870 ml   Output 610 ml   Net 1260 ml     % Intake of Estimated Energy Needs: 0 - 25 %  % Meal Intake: NPO    Nutrition Risk    2x week    Assessment and Plan    Severe protein-calorie malnutrition  Nutrition Problem:  Severe Protein-Calorie Malnutrition  Malnutrition in the context of Chronic Illness/Injury    Related to (etiology):  Physiological causes resulting in anorexia or diminished intake    Signs and Symptoms (as evidenced by):  Body Fat Depletion: mild depletion of orbitals   Muscle Mass Depletion: mild and moderate depletion of temples and clavicle region   Hx of cerebral palsy, malnutrition, wounds    Interventions(treatment strategy):  Enteral nutrition    Nutrition Diagnosis Status:  New         Monitor and Evaluation    Food and Nutrient Intake: energy intake  Food and Nutrient Adminstration: enteral and parenteral nutrition administration  Physical Activity and Function: nutrition-related ADLs and IADLs  Anthropometric Measurements:  weight  Biochemical Data, Medical Tests and Procedures: electrolyte and renal panel, gastrointestinal profile, glucose/endocrine profile, inflammatory profile, lipid profile  Nutrition-Focused Physical Findings: overall appearance     Malnutrition Assessment  Malnutrition Type: chronic illness  Skin (Micronutrient): wounds unhealed   Micronutrient Evaluation Comments: hx of malnutrition       Orbital Region (Subcutaneous Fat Loss): mild depletion   [place] Region (Muscle Loss): mild depletion  Clavicle Bone Region (Muscle Loss): mild depletion                 Nutrition Follow-Up    RD Follow-up?: Yes    Thanks!  Tamie Fontaine MPH RD LDN

## 2020-10-23 NOTE — PROGRESS NOTES
Pharmacokinetic Initial Assessment: IV Vancomycin    Assessment/Plan:    Continue intravenous vancomycin with a maintenance dose of vancomycin 1000 mg IV every 12 hours.    Prior to transfer to [facility], patient was previously on Vancomycin 750 mg IV every 12 hours at [facility], with most recent trough of 13.3 mcg/mL at 1055 on 10/22/20 prior to transfer. Vancomycin regimen was subsequently increased from 750 mg IV every 12 hours to 1000 mg IV every 12 hours. However, new dose of 1000 mg was not completed prior to transfer from LTAC facility. Nurse at [facility] reported that only about 50 mL (approximately 200 mg) was given prior to transfer.     Desired empiric serum trough concentration is 15 to 20 mcg/mL  Draw vancomycin trough level 60 min prior to third dose on 10/23/20 at approximately 1530.  Pharmacy will continue to follow and monitor vancomycin.      Please contact pharmacy at extension [phone] with any questions regarding this assessment.     Thank you for the consult,   Anjelica Duke PharmD       Patient brief summary:  Glen Moscoso is a 25 y.o. male initiated on antimicrobial therapy with IV Vancomycin for treatment of suspected bone/joint infection, lower respiratory infection, skin & soft tissue infection    Drug Allergies:   Review of patient's allergies indicates:  No Known Allergies    Actual Body Weight:   32.1 kg    Renal Function:   Estimated Creatinine Clearance: 105 mL/min (based on SCr of 0.5 mg/dL).,   Note: due to decreased muscle mass with cerebral palsy, CrCl may over-estimate actual renal function.     Dialysis Method (if applicable):  N/A    CBC (last 72 hours):  Recent Labs   Lab Result Units 10/22/20  1458   WBC K/uL 18.73*   Hemoglobin g/dL 11.2*   Hematocrit % 36.3*   Platelets K/uL 681*   Gran% % 86.2*   Lymph% % 5.2*   Mono% % 7.8   Eosinophil% % 0.1   Basophil% % 0.1   Differential Method  Automated       Metabolic Panel (last 72 hours):  Recent Labs   Lab Result Units 10/22/20  1458  10/22/20  1653   Sodium mmol/L 136  --    Potassium mmol/L 4.1  --    Chloride mmol/L 105  --    CO2 mmol/L 20*  --    Glucose mg/dL 109  --    Glucose, UA   --  Negative   BUN, Bld mg/dL 17  --    Creatinine mg/dL 0.5  --    Albumin g/dL 2.4*  --    Total Bilirubin mg/dL 0.1  --    Alkaline Phosphatase U/L 114  --    AST U/L 17  --    ALT U/L 12  --        Drug levels (last 3 results):  No results for input(s): VANCOMYCINRA, VANCOMYCINPE, VANCOMYCINTR in the last 72 hours.    Microbiologic Results:  Microbiology Results (last 7 days)     Procedure Component Value Units Date/Time    Culture, Respiratory with Gram Stain [[id number]]     Order Status: No result Specimen: Respiratory from Endotracheal Aspirate     Blood culture x two cultures. Draw prior to antibiotics. [[id number]] Collected: 10/22/20 1530    Order Status: Sent Specimen: Blood from Peripheral, Upper Arm, Right Updated: 10/22/20 1555    Blood culture x two cultures. Draw prior to antibiotics. [[id number]] Collected: 10/22/20 1545    Order Status: Sent Specimen: Blood from Peripheral, Upper Arm, Right Updated: 10/22/20 1551        Thank you for allowing us to participate in this patient's care.     Anjelica Duke, [place] 10/22/2020 7:27 PM

## 2020-10-23 NOTE — ASSESSMENT & PLAN NOTE
Suspect baseline sinus tachycardia exacerbated by fever, sepsis  Holding enteral propanolol given marginal BP  Optimize analgesia, sedation  10/23 - resume home dose propanolol

## 2020-10-24 LAB
ABO + RH BLD: NORMAL
ALBUMIN SERPL BCP-MCNC: 1.7 G/DL (ref 3.5–5.2)
ALLENS TEST: ABNORMAL
ALP SERPL-CCNC: 142 U/L (ref 55–135)
ALT SERPL W/O P-5'-P-CCNC: 19 U/L (ref 10–44)
ANION GAP SERPL CALC-SCNC: 6 MMOL/L (ref 8–16)
AST SERPL-CCNC: 20 U/L (ref 10–40)
BASOPHILS # BLD AUTO: 0.01 K/UL (ref 0–0.2)
BASOPHILS NFR BLD: 0.1 % (ref 0–1.9)
BILIRUB SERPL-MCNC: 0.1 MG/DL (ref 0.1–1)
BLD GP AB SCN CELLS X3 SERPL QL: NORMAL
BLD PROD TYP BPU: NORMAL
BLOOD UNIT EXPIRATION DATE: NORMAL
BLOOD UNIT TYPE CODE: 9500
BLOOD UNIT TYPE: NORMAL
BUN SERPL-MCNC: 5 MG/DL (ref 6–20)
CALCIUM SERPL-MCNC: 7.9 MG/DL (ref 8.7–10.5)
CHLORIDE SERPL-SCNC: 104 MMOL/L (ref 95–110)
CO2 SERPL-SCNC: 26 MMOL/L (ref 23–29)
CODING SYSTEM: NORMAL
CREAT SERPL-MCNC: 0.5 MG/DL (ref 0.5–1.4)
DELSYS: ABNORMAL
DIFFERENTIAL METHOD: ABNORMAL
DISPENSE STATUS: NORMAL
EOSINOPHIL # BLD AUTO: 0.7 K/UL (ref 0–0.5)
EOSINOPHIL NFR BLD: 6.9 % (ref 0–8)
ERYTHROCYTE [DISTWIDTH] IN BLOOD BY AUTOMATED COUNT: 13.8 % (ref 11.5–14.5)
ERYTHROCYTE [SEDIMENTATION RATE] IN BLOOD BY WESTERGREN METHOD: 18 MM/H
EST. GFR  (AFRICAN AMERICAN): >60 ML/MIN/1.73 M^2
EST. GFR  (NON AFRICAN AMERICAN): >60 ML/MIN/1.73 M^2
FIO2: 30
GLUCOSE SERPL-MCNC: 107 MG/DL (ref 70–110)
HCO3 UR-SCNC: 24.9 MMOL/L (ref 24–28)
HCT VFR BLD AUTO: 23.1 % (ref 40–54)
HGB BLD-MCNC: 7.1 G/DL (ref 14–18)
IMM GRANULOCYTES # BLD AUTO: 0.07 K/UL (ref 0–0.04)
IMM GRANULOCYTES NFR BLD AUTO: 0.7 % (ref 0–0.5)
LYMPHOCYTES # BLD AUTO: 1.2 K/UL (ref 1–4.8)
LYMPHOCYTES NFR BLD: 12.4 % (ref 18–48)
MAGNESIUM SERPL-MCNC: 2.1 MG/DL (ref 1.6–2.6)
MCH RBC QN AUTO: 29 PG (ref 27–31)
MCHC RBC AUTO-ENTMCNC: 30.7 G/DL (ref 32–36)
MCV RBC AUTO: 94 FL (ref 82–98)
MODE: ABNORMAL
MONOCYTES # BLD AUTO: 1.2 K/UL (ref 0.3–1)
MONOCYTES NFR BLD: 12 % (ref 4–15)
NEUTROPHILS # BLD AUTO: 6.7 K/UL (ref 1.8–7.7)
NEUTROPHILS NFR BLD: 67.9 % (ref 38–73)
NRBC BLD-RTO: 0 /100 WBC
NUM UNITS TRANS PACKED RBC: NORMAL
PCO2 BLDA: 40.4 MMHG (ref 35–45)
PEEP: 5
PH SMN: 7.4 [PH] (ref 7.35–7.45)
PHOSPHATE SERPL-MCNC: 3.2 MG/DL (ref 2.7–4.5)
PLATELET # BLD AUTO: 436 K/UL (ref 150–350)
PMV BLD AUTO: 8.9 FL (ref 9.2–12.9)
PO2 BLDA: 140 MMHG (ref 80–100)
POC BE: 0 MMOL/L
POC SATURATED O2: 99 % (ref 95–100)
POTASSIUM SERPL-SCNC: 3.6 MMOL/L (ref 3.5–5.1)
PROT SERPL-MCNC: 5.7 G/DL (ref 6–8.4)
RBC # BLD AUTO: 2.45 M/UL (ref 4.6–6.2)
SAMPLE: ABNORMAL
SITE: ABNORMAL
SODIUM SERPL-SCNC: 136 MMOL/L (ref 136–145)
VANCOMYCIN TROUGH SERPL-MCNC: 14 UG/ML (ref 10–22)
VT: 325
WBC # BLD AUTO: 9.82 K/UL (ref 3.9–12.7)

## 2020-10-24 PROCEDURE — 94003 VENT MGMT INPAT SUBQ DAY: CPT

## 2020-10-24 PROCEDURE — 80053 COMPREHEN METABOLIC PANEL: CPT

## 2020-10-24 PROCEDURE — 36430 TRANSFUSION BLD/BLD COMPNT: CPT

## 2020-10-24 PROCEDURE — 86920 COMPATIBILITY TEST SPIN: CPT

## 2020-10-24 PROCEDURE — 99291 CRITICAL CARE FIRST HOUR: CPT | Mod: ,,, | Performed by: INTERNAL MEDICINE

## 2020-10-24 PROCEDURE — 82803 BLOOD GASES ANY COMBINATION: CPT

## 2020-10-24 PROCEDURE — 99291 PR CRITICAL CARE, E/M 30-74 MINUTES: ICD-10-PCS | Mod: ,,, | Performed by: INTERNAL MEDICINE

## 2020-10-24 PROCEDURE — 85025 COMPLETE CBC W/AUTO DIFF WBC: CPT

## 2020-10-24 PROCEDURE — 99900035 HC TECH TIME PER 15 MIN (STAT)

## 2020-10-24 PROCEDURE — 25000242 PHARM REV CODE 250 ALT 637 W/ HCPCS: Performed by: NURSE PRACTITIONER

## 2020-10-24 PROCEDURE — 63600175 PHARM REV CODE 636 W HCPCS: Performed by: NURSE PRACTITIONER

## 2020-10-24 PROCEDURE — 25000003 PHARM REV CODE 250: Performed by: NURSE PRACTITIONER

## 2020-10-24 PROCEDURE — 25000003 PHARM REV CODE 250: Performed by: INTERNAL MEDICINE

## 2020-10-24 PROCEDURE — 84100 ASSAY OF PHOSPHORUS: CPT

## 2020-10-24 PROCEDURE — 94640 AIRWAY INHALATION TREATMENT: CPT

## 2020-10-24 PROCEDURE — P9016 RBC LEUKOCYTES REDUCED: HCPCS

## 2020-10-24 PROCEDURE — 20000000 HC ICU ROOM

## 2020-10-24 PROCEDURE — 86901 BLOOD TYPING SEROLOGIC RH(D): CPT

## 2020-10-24 PROCEDURE — 36600 WITHDRAWAL OF ARTERIAL BLOOD: CPT

## 2020-10-24 PROCEDURE — 83735 ASSAY OF MAGNESIUM: CPT

## 2020-10-24 PROCEDURE — 80202 ASSAY OF VANCOMYCIN: CPT

## 2020-10-24 PROCEDURE — 63600175 PHARM REV CODE 636 W HCPCS: Performed by: INTERNAL MEDICINE

## 2020-10-24 RX ORDER — POTASSIUM CHLORIDE 1.5 G/1.58G
20 POWDER, FOR SOLUTION ORAL DAILY
Status: DISCONTINUED | OUTPATIENT
Start: 2020-10-24 | End: 2020-10-25

## 2020-10-24 RX ORDER — HYDROCODONE BITARTRATE AND ACETAMINOPHEN 500; 5 MG/1; MG/1
TABLET ORAL
Status: DISCONTINUED | OUTPATIENT
Start: 2020-10-24 | End: 2020-10-25

## 2020-10-24 RX ADMIN — PIPERACILLIN AND TAZOBACTAM 4.5 G: 4; .5 INJECTION, POWDER, LYOPHILIZED, FOR SOLUTION INTRAVENOUS; PARENTERAL at 12:10

## 2020-10-24 RX ADMIN — SODIUM CHLORIDE 30 MG/ML INHALATION SOLUTION 4 ML: 30 SOLUTION INHALANT at 03:10

## 2020-10-24 RX ADMIN — PIPERACILLIN AND TAZOBACTAM 4.5 G: 4; .5 INJECTION, POWDER, LYOPHILIZED, FOR SOLUTION INTRAVENOUS; PARENTERAL at 04:10

## 2020-10-24 RX ADMIN — CHLORHEXIDINE GLUCONATE 0.12% ORAL RINSE 15 ML: 1.2 LIQUID ORAL at 08:10

## 2020-10-24 RX ADMIN — PROPOFOL 15 MCG/KG/MIN: 10 INJECTION, EMULSION INTRAVENOUS at 08:10

## 2020-10-24 RX ADMIN — ACETAMINOPHEN 650 MG: 325 TABLET ORAL at 07:10

## 2020-10-24 RX ADMIN — SODIUM CHLORIDE 30 MG/ML INHALATION SOLUTION 4 ML: 30 SOLUTION INHALANT at 12:10

## 2020-10-24 RX ADMIN — CIPROFLOXACIN 400 MG: 2 INJECTION, SOLUTION INTRAVENOUS at 05:10

## 2020-10-24 RX ADMIN — SODIUM CHLORIDE 30 MG/ML INHALATION SOLUTION 4 ML: 30 SOLUTION INHALANT at 07:10

## 2020-10-24 RX ADMIN — MICONAZOLE NITRATE: 2 OINTMENT TOPICAL at 08:10

## 2020-10-24 RX ADMIN — PHENOBARBITAL 100 MG: 20 ELIXIR ORAL at 09:10

## 2020-10-24 RX ADMIN — ENOXAPARIN SODIUM 30 MG: 100 INJECTION SUBCUTANEOUS at 05:10

## 2020-10-24 RX ADMIN — BACLOFEN 10 MG: 10 TABLET ORAL at 08:10

## 2020-10-24 RX ADMIN — VANCOMYCIN HYDROCHLORIDE 750 MG: 750 INJECTION, POWDER, LYOPHILIZED, FOR SOLUTION INTRAVENOUS at 05:10

## 2020-10-24 RX ADMIN — PIPERACILLIN AND TAZOBACTAM 4.5 G: 4; .5 INJECTION, POWDER, LYOPHILIZED, FOR SOLUTION INTRAVENOUS; PARENTERAL at 08:10

## 2020-10-24 RX ADMIN — GABAPENTIN 250 MG: 250 SUSPENSION ORAL at 09:10

## 2020-10-24 RX ADMIN — IPRATROPIUM BROMIDE AND ALBUTEROL SULFATE 3 ML: .5; 3 SOLUTION RESPIRATORY (INHALATION) at 11:10

## 2020-10-24 RX ADMIN — BACLOFEN 10 MG: 10 TABLET ORAL at 02:10

## 2020-10-24 RX ADMIN — PIPERACILLIN AND TAZOBACTAM 4.5 G: 4; .5 INJECTION, POWDER, LYOPHILIZED, FOR SOLUTION INTRAVENOUS; PARENTERAL at 11:10

## 2020-10-24 RX ADMIN — IPRATROPIUM BROMIDE AND ALBUTEROL SULFATE 3 ML: .5; 3 SOLUTION RESPIRATORY (INHALATION) at 07:10

## 2020-10-24 RX ADMIN — IPRATROPIUM BROMIDE AND ALBUTEROL SULFATE 3 ML: .5; 3 SOLUTION RESPIRATORY (INHALATION) at 04:10

## 2020-10-24 RX ADMIN — POTASSIUM CHLORIDE 20 MEQ: 1.5 POWDER, FOR SOLUTION ORAL at 12:10

## 2020-10-24 RX ADMIN — PROPRANOLOL HYDROCHLORIDE 20 MG: 20 SOLUTION ORAL at 08:10

## 2020-10-24 RX ADMIN — PROPRANOLOL HYDROCHLORIDE 20 MG: 20 SOLUTION ORAL at 02:10

## 2020-10-24 RX ADMIN — IPRATROPIUM BROMIDE AND ALBUTEROL SULFATE 3 ML: .5; 3 SOLUTION RESPIRATORY (INHALATION) at 12:10

## 2020-10-24 RX ADMIN — MUPIROCIN: 20 OINTMENT TOPICAL at 08:10

## 2020-10-24 RX ADMIN — Medication 150 MCG/HR: at 07:10

## 2020-10-24 RX ADMIN — FAMOTIDINE 20 MG: 10 INJECTION INTRAVENOUS at 08:10

## 2020-10-24 RX ADMIN — PROPRANOLOL HYDROCHLORIDE 20 MG: 20 SOLUTION ORAL at 09:10

## 2020-10-24 NOTE — ASSESSMENT & PLAN NOTE
Due Aspiraton Pneumonia   Patient intubated / Sedated  Pulmonary CC  ICU  ABX  Wean as tolerated

## 2020-10-24 NOTE — NURSING
At 1420 RN went to materials management to  dressing supplies as patient's sacral decub dressings were both saturated and falling off.  Knocked and rang bell, no answer.  Upon returning to unit at 1430 found patient self extubated and multiple staff members at the bedside.  Patient placed on venti mask 35%, O2 sats 100%, heart rate and blood pressure both elevated with Propofol and Fentanyl off.  MD notified.

## 2020-10-24 NOTE — PROGRESS NOTES
[facility] Medicine  Progress Note    Patient Name: Glen Moscoso  MRN: [id number]  Patient Class: IP- Inpatient   Admission Date: 10/22/2020  Length of Stay: 2 days  Attending Physician: Smitha Smith MD  Primary Care Provider: Yadi Lawson MD        Subjective:     Principal Problem:Acute hypoxemic respiratory failure        HPI:  Glen Moscoso is a 25 y.o. male patient with a PMHx of cerebral palsy who presents to the Emergency Department for evaluation of respiratory distress which onset PTA. EMS reports pt was found face down on the pillow in nursing home. Pt arrives tachycardic and tachypneic. Patient recently discharge from [facility] where he was treated for respiratory failure and sepsis. [facility], Cefepime and Flagyl to treat pelvic osteomyelitis and resolving colitis. In the ED, WBCs 18.7K, Platelet 681, CO2 20, Lactate 2.3. UA negative. CXR revealed mild atelectasis or infiltrate right perihilar region and right midlung zone. Pt with worsening resp failure Subsequently pt was intubated. Sepsis protocol initiated in the ED including IV hydration. Hospital medicine called for admission. Patient placed in ICU. Patient is a full code, SDM mother, Leni Moscoso at [phone].     Overview/Hospital Course:  10/23  Remains intubated , enteral nutrition initiated. Continued treatment of sepsis due to aspiration pneumonia .due to poor air way protection .  Cultures are with negative growth to date. Temp Max 103 and wbc 19.30   Tachycardia was reported over night , follow up Echo EF 50 % normal diastolic function.  Case discussed with Dr Palma who has agreed for trach placement due to recurrent aspiration pneumonia.    10/24    Remains intubated, Tmax of 101.5 .CT Abd/pelvis - right inferior pubic ramus -cannot exclude     Osteomyelitis. Consult ID      Interval History:     Review of Systems   Unable to perform ROS:  Intubated     Objective:     Vital Signs (Most Recent):  Temp: 99 °F (37.2 °C) (10/24/20 1200)  Pulse: 103 (10/24/20 1330)  Resp: 18 (10/24/20 1330)  BP: (!) 101/59 (10/24/20 1330)  SpO2: 100 % (10/24/20 1330) Vital Signs (24h Range):  Temp:  [98.8 °F (37.1 °C)-101.5 °F (38.6 °C)] 99 °F (37.2 °C)  Pulse:  [] 103  Resp:  [17-60] 18  SpO2:  [92 %-100 %] 100 %  BP: ()/(48-77) 101/59     Weight: 34.3 kg (75 lb 9.9 oz)  Body mass index is 16.36 kg/m².    Intake/Output Summary (Last 24 hours) at 10/24/2020 1345  Last data filed at 10/24/2020 1330  Gross per 24 hour   Intake 4140.31 ml   Output 3465 ml   Net 675.31 ml      Physical Exam  Vitals signs and nursing note reviewed.   Constitutional:       Appearance: Normal appearance.   HENT:      Head: Normocephalic and atraumatic.      Nose: Nose normal.   Eyes:      Extraocular Movements: Extraocular movements intact.      Conjunctiva/sclera: Conjunctivae normal.      Pupils: Pupils are equal, round, and reactive to light.   Cardiovascular:      Rate and Rhythm: Tachycardia present.      Pulses: Normal pulses.      Heart sounds: Normal heart sounds.   Pulmonary:      Breath sounds: No wheezing or rhonchi.      Comments: INTUBATED  Abdominal:      General: Abdomen is flat. Bowel sounds are normal.      Palpations: Abdomen is soft.   Musculoskeletal:         General: Deformity present.   Skin:     Comments: Bilateral ischial wounds , Stage  IV    Neurological:      General: No focal deficit present.      Mental Status: He is alert.   Psychiatric:      Comments: SEDATED ON VENT          Significant Labs:   BMP:   Recent Labs   Lab 10/24/20  0425         K 3.6      CO2 26   BUN 5*   CREATININE 0.5   CALCIUM 7.9*   MG 2.1     CBC:   Recent Labs   Lab 10/23/20  0459 10/23/20  1348 10/24/20  0425   WBC 19.36* 15.31* 9.82   HGB 7.8* 7.4* 7.1*   HCT 24.8* 24.1* 23.1*   * 442* 436*       Significant Imaging: I have reviewed all pertinent imaging  results/findings within the past 24 hours.      Assessment/Plan:      * Acute hypoxemic respiratory failure  Due Aspiraton Pneumonia   Patient intubated / Sedated  Pulmonary CC  ICU  ABX  Wean as tolerated      Right lower lobe pneumonia  Recurrent Aspiration Pneumonia  Trach placement -pending for 10/28/20 with Dr Palma      Tachycardia  Due to infection  ABX  Symptomatic care  Monitor        Osteomyelitis of pelvis  ABX  Blood Cultures- NGTD  Wound Care  ID on COnsult      Severe protein-calorie malnutrition  Enteral tube feeds     Continue supplements        Decubitus ulcer of ischial area, left, stage IV  Wound Care  Antibiotics  Vanc / Zosyn / Cipro  PICC in place  Possible osteomyelitis       Seizure disorder  Phenobarbital  Neuro checks    Cerebral palsy  Supportive care       VTE Risk Mitigation (From admission, onward)         Ordered     enoxaparin injection 30 mg  Every 24 hours      10/23/20 0907     IP VTE HIGH RISK PATIENT  Once      10/22/20 1825     Place sequential compression device  Until discontinued      10/22/20 1754                Discharge Planning   ROCÍO:      Code Status: Full Code   Is the patient medically ready for discharge?:     Reason for patient still in hospital (select all that apply): Patient unstable  Discharge Plan A: Long-term acute care facility (LTAC)            Critical care time spent on the evaluation and treatment of severe organ dysfunction, review of pertinent labs and imaging studies, discussions with consulting providers and discussions with patient/family: 40 minutes.      Smitha Smith MD  Department of Hospital Medicine   [facility]

## 2020-10-24 NOTE — PROGRESS NOTES
[facility]   Critical Care Medicine  Progress Note    Patient Name: Glen Moscoso  MRN: [id number]  Admission Date: 10/22/2020  Hospital Length of Stay: 2 days  Code Status: Full Code  Attending Provider: Smitha Smith MD  Primary Care Provider: Yadi Lawson MD   Principal Problem: Acute hypoxemic respiratory failure    Subjective: slow improvement , remains intubated     HPI:  25 year old male with spastic cerebral palsy, severe contractures, seizure disorder, malnutrition  Presented to ED from LTAC when found face down, unresponsive, with hypoxia  OF NOTE - recent hospitalizations x 2 for pneumonia (serratia), second hospitalization complicated with osteomyelitis from sacral decubitus (underwent surgical I&D) and pneumoperitoneum from suspected colonic perforation that was managed conservatively    On arrival today respiratory rate 50, , sat 89%, SBP 170s, and fever 102  Intubated on ED arrival with anesthesia assistance  Labs revealed leukocytosis, lactic acid 2.3,and procalcitonin 0.12  CT chest abd pelvis revealed - Right lower lobe probable pneumonia.  Osseous erosion could appear similarly but is less favored.  Additional scattered areas of consolidation in the right lung likely also related to infection.  There is a large decubitus ulcer right greater than left. The right ulcer tracks to the posterior aspect of the right inferior pubic ramus. Osteomyelitis cannot be excluded.     Hospital/ICU Course:  Arrived to ICU with OETT to mechanical ventilation, propofol sedation  10/23 - remains intubated on mechanical ventilation; oxygen demand decreased; large amount of thick secretions from trach and oral [id number]/24 - Intubated, improved aeration of lung        Review of Systems   Unable to perform ROS: Intubated     Objective:     Vital Signs (Most Recent):  Temp: 98.8 °F (37.1 °C) (10/24/20 0800)  Pulse: 103 (10/24/20 1145)  Resp: (!) 27 (10/24/20 1145)  BP: 101/67 (10/24/20  1030)  SpO2: 100 % (10/24/20 1145) Vital Signs (24h Range):  Temp:  [98.8 °F (37.1 °C)-101.5 °F (38.6 °C)] 98.8 °F (37.1 °C)  Pulse:  [] 103  Resp:  [17-27] 27  SpO2:  [92 %-100 %] 100 %  BP: ()/(48-77) 101/67     Weight: 34.3 kg (75 lb 9.9 oz)  Body mass index is 16.36 kg/m².      Intake/Output Summary (Last 24 hours) at 10/24/2020 1148  Last data filed at 10/24/2020 1000  Gross per 24 hour   Intake 3632.51 ml   Output 2755 ml   Net 877.51 ml      fentanyl 15 mL/hr at 10/24/20 1000    propofoL 15.196 mcg/kg/min (10/24/20 1000)       Physical Exam  Vitals signs and nursing note reviewed.   Constitutional:       Appearance: He is underweight. He is ill-appearing.      Interventions: He is sedated and intubated.   HENT:      Head: Atraumatic.      Mouth/Throat:      Mouth: Mucous membranes are moist.      Comments: ET tube  Eyes:      Conjunctiva/sclera: Conjunctivae normal.   Neck:      Musculoskeletal: No edema.      Comments: Severe bilateral upper and lower extremity contractures  Cardiovascular:      Rate and Rhythm: Regular rhythm. Tachycardia present.      Pulses:           Radial pulses are 1+ on the right side and 1+ on the left side.        Dorsalis pedis pulses are 1+ on the right side and 1+ on the left side.   Pulmonary:      Effort: He is intubated.      Breath sounds: Examination of the right-lower field reveals decreased breath sounds and rales. Examination of the left-lower field reveals rales. Decreased breath sounds and rales present. No wheezing or rhonchi.   Abdominal:      General: Bowel sounds are decreased. There is no distension.      Palpations: Abdomen is soft.      Comments: Scarring around button PEG   Musculoskeletal:         General: Deformity present.      Right lower leg: No edema.      Left lower leg: No edema.   Skin:     General: Skin is warm and dry.          Neurological:      GCS: GCS eye subscore is 3. GCS verbal subscore is 1. GCS motor subscore is 1.       Comments: Intubated and sedated         Vents:  Vent Mode: A/C (10/24/20 1145)  Ventilator Initiated: Yes (10/22/20 1444)  Set Rate: 18 BPM (10/24/20 1145)  Vt Set: 325 mL (10/24/20 1145)  Pressure Support: 0 cmH20 (10/24/20 1145)  PEEP/CPAP: 5 cmH20 (10/24/20 1145)  Oxygen Concentration (%): 30 (10/24/20 1145)  Peak Airway Pressure: 23 cmH2O (10/24/20 1145)  Plateau Pressure: 0 cmH20 (10/24/20 1145)  Total Ve: 6.8 mL (10/24/20 1145)  F/VT Ratio<105 (RSBI): (!) 74.59 (10/24/20 1145)    Lines/Drains/Airways     Peripherally Inserted Central Catheter Line            PICC Double Lumen 10/22/20 1300 1 day          Drain                 Gastrostomy/Enterostomy 08/04/20 1653 Percutaneous endoscopic gastrostomy (PEG) feeding 80 days         Gastrostomy/Enterostomy 10/22/20 1300 LUQ 1 day         Urethral Catheter 10/22/20 1653 Latex 16 Fr. 1 day          Airway                 Airway - Non-Surgical 10/22/20 1430 Endotracheal Tube 1 day          Peripheral Intravenous Line                 Midline Catheter Insertion/Assessment  - Single Lumen 10/22/20 1548 Right basilic vein (medial side of arm) 20g x 8cm 1 day                Significant Labs:    CBC/Anemia Profile:  Recent Labs   Lab 10/23/20  0459 10/23/20  1348 10/24/20  0425   WBC 19.36* 15.31* 9.82   HGB 7.8* 7.4* 7.1*   HCT 24.8* 24.1* 23.1*   * 442* 436*   MCV 94 94 94   RDW 14.1 13.9 13.8        Chemistries:  Recent Labs   Lab 10/22/20  1458 10/23/20  0459 10/24/20  0425    137 136   K 4.1 3.5 3.6    104 104   CO2 20* 24 26   BUN 17 9 5*   CREATININE 0.5 0.4* 0.5   CALCIUM 9.0 7.9* 7.9*   ALBUMIN 2.4* 1.8* 1.7*   PROT 7.4 5.7* 5.7*   BILITOT 0.1 0.2 0.1   ALKPHOS 114 162* 142*   ALT 12 27 19   AST 17 65* 20   MG  --  1.6 2.1   PHOS  --  3.0 3.2       All pertinent labs within the past 24 hours have been reviewed.    Significant Imaging:   I have reviewed all pertinent imaging results/findings within the past 24 hours.      ABG  Recent Labs   Lab  10/24/20  0451   PH 7.398   PO2 140*   PCO2 40.4   HCO3 24.9   BE 0     Assessment/Plan:     Neuro  Seizure disorder  Continue home dose phenobarbital    Cerebral palsy  Sedated for mechanical ventilation  Resume baclofen, neurontin    Derm  Decubitus ulcer of ischial area, left, stage IV  Appreciate WOC eval, follow care recs    Pulmonary  * Acute hypoxemic respiratory failure  Vent settings reviewed and adjusted to optimize gas exchange  VAP prophylaxis  10/23 - decreased oxygen demand and lung mechanics today; strong suspicion for inability to protect airway + thick secretions, will add hypertonic saline nebs and maintain intubation today  10/24 improved continue therapy    Right lower lobe pneumonia  Recent flouroquinolone resistant seratia pneumonia  Empiric vanc, zosyn  Culture endotracheal aspirate  Monitor temp, wbc, culture data  Recommend trach placement for airway protection from recurrent aspiration pneumonias; discussed with mother and answered all questions. Consulted ENT and spoke with Dr Palma who will plan for trach placement on Tuesday 10/27/20 at 8am    Cardiac/Vascular  Tachycardia  Suspect baseline sinus tachycardia exacerbated by fever, sepsis  Holding enteral propanolol given marginal BP  Optimize analgesia, sedation  10/23 - resume home dose propanolol    ID  Osteomyelitis of pelvis  Transferred to LTAC on 10/10 with plan for IV abx and recs to continue until 10/22  Currently on abx coverage for aspiration pneumonia   If concern for ongoing osteo, will likely need MRI    Endocrine  Severe protein-calorie malnutrition  TF per RD recs        Critical Care Daily Checklist:    A: Awake: RASS Goal/Actual Goal: RASS Goal: -1-->drowsy  Actual: [place] Agitation Sedation Scale (RASS): Drowsy   B: Spontaneous Breathing Trial Performed? Spon. Breathing Trial Initiated?: (S) Initiated (10/23/20 0800)   C: SAT & SBT Coordinated?  No - plan on trach to protect airway                 D: Delirium: CAM-ICU      E: Early Mobility Performed? No   F: Feeding Goal: Goals: Meet >75% EEN/EPN by RD f/u  Status: Nutrition Goal Status: new   Current Diet Order   Procedures    Diet NPO      AS: Analgesia/Sedation adequate   T: Thromboembolic Prophylaxis yes   H: HOB > 300 Yes   U: Stress Ulcer Prophylaxis (if needed) y   G: Glucose Control adequate   B: Bowel Function Stool Occurrence: 2   I: Indwelling Catheter (Lines & Iglesias) Necessity needed   D: De-escalation of Antimicrobials/Pharmacotherapies na    Plan for the day/ETD Support, transfuse Packed Red Blood Cells due to symptomatic anemia    Code Status:  Family/Goals of Care: Full Code  No one available     Critical Care Time: 35 minutes  Critical secondary to Patient has a condition that poses threat to life and bodily function: Severe Respiratory Distress      Critical care was time spent personally by me on the following activities: development of treatment plan with patient or surrogate and bedside caregivers, discussions with consultants, evaluation of patient's response to treatment, examination of patient, ordering and performing treatments and interventions, ordering and review of laboratory studies, ordering and review of radiographic studies, pulse oximetry, re-evaluation of patient's condition. This critical care time did not overlap with that of any other provider or involve time for any procedures.     Maikel Garcia MD  Critical Care Medicine  [facility]

## 2020-10-24 NOTE — PROGRESS NOTES
Pharmacokinetic Assessment Follow Up: IV Vancomycin    Vancomycin serum concentration assessment(s):    The trough level was drawn correctly and can be used to guide therapy at this time. The measurement is below the desired definitive target range of 15 to 20 mcg/mL.    Vancomycin Regimen Plan:    Continue regimen to Vancomycin 750 mg IV every 12 hours with next serum trough concentration measured at 0430 prior to next dose on 10/25/2020.    Drug levels (last 3 results):  Recent Labs   Lab Result Units 10/23/20  1516 10/24/20  1625   Vancomycin-Trough ug/mL 17.6 14.0       Pharmacy will continue to follow and monitor vancomycin.    Please contact pharmacy at extension [phone] for questions regarding this assessment.    Thank you for the consult,   Sabi Moe       Patient brief summary:  Glen Moscoso is a 25 y.o. male initiated on antimicrobial therapy with IV Vancomycin for treatment of bone/joint infection    The patient's current regimen is 750 mg IV Q12H.     Drug Allergies:   Review of patient's allergies indicates:  No Known Allergies    Actual Body Weight:   34.3 Kg     Renal Function:   Estimated Creatinine Clearance: 111.5 mL/min (based on SCr of 0.5 mg/dL).,     Dialysis Method (if applicable):  N/A    CBC (last 72 hours):  Recent Labs   Lab Result Units 10/22/20  1458 10/23/20  0459 10/23/20  1348 10/24/20  0425   WBC K/uL 18.73* 19.36* 15.31* 9.82   Hemoglobin g/dL 11.2* 7.8* 7.4* 7.1*   Hematocrit % 36.3* 24.8* 24.1* 23.1*   Platelets K/uL 681* 517* 442* 436*   Gran% % 86.2* 81.3* 81.5* 67.9   Lymph% % 5.2* 9.0* 8.6* 12.4*   Mono% % 7.8 7.3 8.8 12.0   Eosinophil% % 0.1 1.5 0.1 6.9   Basophil% % 0.1 0.2 0.2 0.1   Differential Method  Automated Automated Automated Automated       Metabolic Panel (last 72 hours):  Recent Labs   Lab Result Units 10/22/20  1458 10/22/20  1653 10/23/20  0459 10/24/20  0425   Sodium mmol/L 136  --  137 136   Potassium mmol/L 4.1  --  3.5 3.6   Chloride mmol/L 105  --   104 104   CO2 mmol/L 20*  --  24 26   Glucose mg/dL 109  --  103 107   Glucose, UA   --  Negative  --   --    BUN, Bld mg/dL 17  --  9 5*   Creatinine mg/dL 0.5  --  0.4* 0.5   Albumin g/dL 2.4*  --  1.8* 1.7*   Total Bilirubin mg/dL 0.1  --  0.2 0.1   Alkaline Phosphatase U/L 114  --  162* 142*   AST U/L 17  --  65* 20   ALT U/L 12  --  27 19   Magnesium mg/dL  --   --  1.6 2.1   Phosphorus mg/dL  --   --  3.0 3.2       Vancomycin Administrations:  vancomycin given in the last 96 hours                   vancomycin 750 mg in dextrose 5 % 250 mL IVPB (ready to mix system) (mg) 750 mg New Bag 10/24/20 1733     750 mg New Bag  0542     750 mg New Bag 10/23/20 1703    vancomycin in dextrose 5 % 1 gram/250 mL IVPB 1,000 mg (mg) 1,000 mg New Bag 10/23/20 0447     1,000 mg New Bag 10/22/20 1625                Microbiologic Results:  Microbiology Results (last 7 days)     Procedure Component Value Units Date/Time    Blood culture x two cultures. Draw prior to antibiotics. [[id number]] Collected: 10/22/20 1545    Order Status: Completed Specimen: Blood from Peripheral, Upper Arm, Right Updated: 10/23/20 2312     Blood Culture, Routine No Growth to date      No Growth to date    Narrative:      Aerobic and anaerobic    Blood culture x two cultures. Draw prior to antibiotics. [[id number]] Collected: 10/22/20 1530    Order Status: Completed Specimen: Blood from Peripheral, Upper Arm, Right Updated: 10/23/20 2312     Blood Culture, Routine No Growth to date      No Growth to date    Narrative:      Aerobic and anaerobic    Culture, Respiratory with Gram Stain [[id number]]     Order Status: No result Specimen: Respiratory from Endotracheal Aspirate

## 2020-10-24 NOTE — ASSESSMENT & PLAN NOTE
Vent settings reviewed and adjusted to optimize gas exchange  VAP prophylaxis  10/23 - decreased oxygen demand and lung mechanics today; strong suspicion for inability to protect airway + thick secretions, will add hypertonic saline nebs and maintain intubation today  10/24 improved continue therapy

## 2020-10-24 NOTE — PLAN OF CARE
Assumed care of patient after completing bedside handoff.  Lines, drips, alarms, and monitors checked.  Physical assessment completed, immediate patient needs addressed.  Patient turned [id number]

## 2020-10-24 NOTE — PLAN OF CARE
No acute events overnight. Tmax 101.5, responded to tylenol. HR remained ; tolerated sedation although BP remained somewhat soft. Safety measures in place and resting comfortably.

## 2020-10-24 NOTE — SUBJECTIVE & OBJECTIVE
Interval History:     Review of Systems   Unable to perform ROS: Intubated     Objective:     Vital Signs (Most Recent):  Temp: 99 °F (37.2 °C) (10/24/20 1200)  Pulse: 103 (10/24/20 1330)  Resp: 18 (10/24/20 1330)  BP: (!) 101/59 (10/24/20 1330)  SpO2: 100 % (10/24/20 1330) Vital Signs (24h Range):  Temp:  [98.8 °F (37.1 °C)-101.5 °F (38.6 °C)] 99 °F (37.2 °C)  Pulse:  [] 103  Resp:  [17-60] 18  SpO2:  [92 %-100 %] 100 %  BP: ()/(48-77) 101/59     Weight: 34.3 kg (75 lb 9.9 oz)  Body mass index is 16.36 kg/m².    Intake/Output Summary (Last 24 hours) at 10/24/2020 1345  Last data filed at 10/24/2020 1330  Gross per 24 hour   Intake 4140.31 ml   Output 3465 ml   Net 675.31 ml      Physical Exam  Vitals signs and nursing note reviewed.   Constitutional:       Appearance: Normal appearance.   HENT:      Head: Normocephalic and atraumatic.      Nose: Nose normal.   Eyes:      Extraocular Movements: Extraocular movements intact.      Conjunctiva/sclera: Conjunctivae normal.      Pupils: Pupils are equal, round, and reactive to light.   Cardiovascular:      Rate and Rhythm: Tachycardia present.      Pulses: Normal pulses.      Heart sounds: Normal heart sounds.   Pulmonary:      Breath sounds: No wheezing or rhonchi.      Comments: INTUBATED  Abdominal:      General: Abdomen is flat. Bowel sounds are normal.      Palpations: Abdomen is soft.   Musculoskeletal:         General: Deformity present.   Skin:     Comments: Bilateral ischial wounds , Stage  IV    Neurological:      General: No focal deficit present.      Mental Status: He is alert.   Psychiatric:      Comments: SEDATED ON VENT          Significant Labs:   BMP:   Recent Labs   Lab 10/24/20  0425         K 3.6      CO2 26   BUN 5*   CREATININE 0.5   CALCIUM 7.9*   MG 2.1     CBC:   Recent Labs   Lab 10/23/20  0459 10/23/20  1348 10/24/20  0425   WBC 19.36* 15.31* 9.82   HGB 7.8* 7.4* 7.1*   HCT 24.8* 24.1* 23.1*   * 442* 436*        Significant Imaging: I have reviewed all pertinent imaging results/findings within the past 24 hours.

## 2020-10-24 NOTE — SUBJECTIVE & OBJECTIVE
Review of Systems   Unable to perform ROS: Intubated     Objective:     Vital Signs (Most Recent):  Temp: 98.8 °F (37.1 °C) (10/24/20 0800)  Pulse: 103 (10/24/20 1145)  Resp: (!) 27 (10/24/20 1145)  BP: 101/67 (10/24/20 1030)  SpO2: 100 % (10/24/20 1145) Vital Signs (24h Range):  Temp:  [98.8 °F (37.1 °C)-101.5 °F (38.6 °C)] 98.8 °F (37.1 °C)  Pulse:  [] 103  Resp:  [17-27] 27  SpO2:  [92 %-100 %] 100 %  BP: ()/(48-77) 101/67     Weight: 34.3 kg (75 lb 9.9 oz)  Body mass index is 16.36 kg/m².      Intake/Output Summary (Last 24 hours) at 10/24/2020 1148  Last data filed at 10/24/2020 1000  Gross per 24 hour   Intake 3632.51 ml   Output 2755 ml   Net 877.51 ml      fentanyl 15 mL/hr at 10/24/20 1000    propofoL 15.196 mcg/kg/min (10/24/20 1000)       Physical Exam  Vitals signs and nursing note reviewed.   Constitutional:       Appearance: He is underweight. He is ill-appearing.      Interventions: He is sedated and intubated.   HENT:      Head: Atraumatic.      Mouth/Throat:      Mouth: Mucous membranes are moist.      Comments: ET tube  Eyes:      Conjunctiva/sclera: Conjunctivae normal.   Neck:      Musculoskeletal: No edema.      Comments: Severe bilateral upper and lower extremity contractures  Cardiovascular:      Rate and Rhythm: Regular rhythm. Tachycardia present.      Pulses:           Radial pulses are 1+ on the right side and 1+ on the left side.        Dorsalis pedis pulses are 1+ on the right side and 1+ on the left side.   Pulmonary:      Effort: He is intubated.      Breath sounds: Examination of the right-lower field reveals decreased breath sounds and rales. Examination of the left-lower field reveals rales. Decreased breath sounds and rales present. No wheezing or rhonchi.   Abdominal:      General: Bowel sounds are decreased. There is no distension.      Palpations: Abdomen is soft.      Comments: Scarring around button PEG   Musculoskeletal:         General: Deformity present.       Right lower leg: No edema.      Left lower leg: No edema.   Skin:     General: Skin is warm and dry.          Neurological:      GCS: GCS eye subscore is 3. GCS verbal subscore is 1. GCS motor subscore is 1.      Comments: Intubated and sedated         Vents:  Vent Mode: A/C (10/24/20 1145)  Ventilator Initiated: Yes (10/22/20 1444)  Set Rate: 18 BPM (10/24/20 1145)  Vt Set: 325 mL (10/24/20 1145)  Pressure Support: 0 cmH20 (10/24/20 1145)  PEEP/CPAP: 5 cmH20 (10/24/20 1145)  Oxygen Concentration (%): 30 (10/24/20 1145)  Peak Airway Pressure: 23 cmH2O (10/24/20 1145)  Plateau Pressure: 0 cmH20 (10/24/20 1145)  Total Ve: 6.8 mL (10/24/20 1145)  F/VT Ratio<105 (RSBI): (!) 74.59 (10/24/20 1145)    Lines/Drains/Airways     Peripherally Inserted Central Catheter Line            PICC Double Lumen 10/22/20 1300 1 day          Drain                 Gastrostomy/Enterostomy 08/04/20 1653 Percutaneous endoscopic gastrostomy (PEG) feeding 80 days         Gastrostomy/Enterostomy 10/22/20 1300 LUQ 1 day         Urethral Catheter 10/22/20 1653 Latex 16 Fr. 1 day          Airway                 Airway - Non-Surgical 10/22/20 1430 Endotracheal Tube 1 day          Peripheral Intravenous Line                 Midline Catheter Insertion/Assessment  - Single Lumen 10/22/20 1548 Right basilic vein (medial side of arm) 20g x 8cm 1 day                Significant Labs:    CBC/Anemia Profile:  Recent Labs   Lab 10/23/20  0459 10/23/20  1348 10/24/20  0425   WBC 19.36* 15.31* 9.82   HGB 7.8* 7.4* 7.1*   HCT 24.8* 24.1* 23.1*   * 442* 436*   MCV 94 94 94   RDW 14.1 13.9 13.8        Chemistries:  Recent Labs   Lab 10/22/20  1458 10/23/20  0459 10/24/20  0425    137 136   K 4.1 3.5 3.6    104 104   CO2 20* 24 26   BUN 17 9 5*   CREATININE 0.5 0.4* 0.5   CALCIUM 9.0 7.9* 7.9*   ALBUMIN 2.4* 1.8* 1.7*   PROT 7.4 5.7* 5.7*   BILITOT 0.1 0.2 0.1   ALKPHOS 114 162* 142*   ALT 12 27 19   AST 17 65* 20   MG  --  1.6 2.1   PHOS  --   3.0 3.2       All pertinent labs within the past 24 hours have been reviewed.    Significant Imaging:   I have reviewed all pertinent imaging results/findings within the past 24 hours.

## 2020-10-25 LAB
ALBUMIN SERPL BCP-MCNC: 1.9 G/DL (ref 3.5–5.2)
ALP SERPL-CCNC: 123 U/L (ref 55–135)
ALT SERPL W/O P-5'-P-CCNC: 13 U/L (ref 10–44)
ANION GAP SERPL CALC-SCNC: 6 MMOL/L (ref 8–16)
AST SERPL-CCNC: 13 U/L (ref 10–40)
BASOPHILS # BLD AUTO: 0.02 K/UL (ref 0–0.2)
BASOPHILS NFR BLD: 0.2 % (ref 0–1.9)
BILIRUB SERPL-MCNC: 0.3 MG/DL (ref 0.1–1)
BUN SERPL-MCNC: 5 MG/DL (ref 6–20)
CALCIUM SERPL-MCNC: 8 MG/DL (ref 8.7–10.5)
CHLORIDE SERPL-SCNC: 107 MMOL/L (ref 95–110)
CO2 SERPL-SCNC: 29 MMOL/L (ref 23–29)
CREAT SERPL-MCNC: 0.5 MG/DL (ref 0.5–1.4)
DIFFERENTIAL METHOD: ABNORMAL
EOSINOPHIL # BLD AUTO: 0 K/UL (ref 0–0.5)
EOSINOPHIL NFR BLD: 0 % (ref 0–8)
ERYTHROCYTE [DISTWIDTH] IN BLOOD BY AUTOMATED COUNT: 14 % (ref 11.5–14.5)
EST. GFR  (AFRICAN AMERICAN): >60 ML/MIN/1.73 M^2
EST. GFR  (NON AFRICAN AMERICAN): >60 ML/MIN/1.73 M^2
GLUCOSE SERPL-MCNC: 113 MG/DL (ref 70–110)
HCT VFR BLD AUTO: 30.2 % (ref 40–54)
HGB BLD-MCNC: 9.5 G/DL (ref 14–18)
IMM GRANULOCYTES # BLD AUTO: 0.07 K/UL (ref 0–0.04)
IMM GRANULOCYTES NFR BLD AUTO: 0.6 % (ref 0–0.5)
LYMPHOCYTES # BLD AUTO: 0.9 K/UL (ref 1–4.8)
LYMPHOCYTES NFR BLD: 8.3 % (ref 18–48)
MAGNESIUM SERPL-MCNC: 2 MG/DL (ref 1.6–2.6)
MCH RBC QN AUTO: 29.1 PG (ref 27–31)
MCHC RBC AUTO-ENTMCNC: 31.5 G/DL (ref 32–36)
MCV RBC AUTO: 92 FL (ref 82–98)
MONOCYTES # BLD AUTO: 1.3 K/UL (ref 0.3–1)
MONOCYTES NFR BLD: 11.8 % (ref 4–15)
NEUTROPHILS # BLD AUTO: 8.5 K/UL (ref 1.8–7.7)
NEUTROPHILS NFR BLD: 79.1 % (ref 38–73)
NRBC BLD-RTO: 0 /100 WBC
PHOSPHATE SERPL-MCNC: 2.4 MG/DL (ref 2.7–4.5)
PLATELET # BLD AUTO: 533 K/UL (ref 150–350)
PMV BLD AUTO: 8.8 FL (ref 9.2–12.9)
POTASSIUM SERPL-SCNC: 3.6 MMOL/L (ref 3.5–5.1)
PROT SERPL-MCNC: 6.4 G/DL (ref 6–8.4)
RBC # BLD AUTO: 3.27 M/UL (ref 4.6–6.2)
SODIUM SERPL-SCNC: 142 MMOL/L (ref 136–145)
VANCOMYCIN TROUGH SERPL-MCNC: 11.8 UG/ML (ref 10–22)
WBC # BLD AUTO: 10.77 K/UL (ref 3.9–12.7)

## 2020-10-25 PROCEDURE — 80202 ASSAY OF VANCOMYCIN: CPT

## 2020-10-25 PROCEDURE — 20000000 HC ICU ROOM

## 2020-10-25 PROCEDURE — 63600175 PHARM REV CODE 636 W HCPCS: Performed by: INTERNAL MEDICINE

## 2020-10-25 PROCEDURE — 25000242 PHARM REV CODE 250 ALT 637 W/ HCPCS: Performed by: NURSE PRACTITIONER

## 2020-10-25 PROCEDURE — 25000003 PHARM REV CODE 250: Performed by: INTERNAL MEDICINE

## 2020-10-25 PROCEDURE — 94640 AIRWAY INHALATION TREATMENT: CPT

## 2020-10-25 PROCEDURE — 83735 ASSAY OF MAGNESIUM: CPT

## 2020-10-25 PROCEDURE — 99291 PR CRITICAL CARE, E/M 30-74 MINUTES: ICD-10-PCS | Mod: ,,, | Performed by: NURSE PRACTITIONER

## 2020-10-25 PROCEDURE — 80053 COMPREHEN METABOLIC PANEL: CPT

## 2020-10-25 PROCEDURE — 25000003 PHARM REV CODE 250: Performed by: NURSE PRACTITIONER

## 2020-10-25 PROCEDURE — 63600175 PHARM REV CODE 636 W HCPCS: Performed by: NURSE PRACTITIONER

## 2020-10-25 PROCEDURE — 99291 CRITICAL CARE FIRST HOUR: CPT | Mod: ,,, | Performed by: NURSE PRACTITIONER

## 2020-10-25 PROCEDURE — 84100 ASSAY OF PHOSPHORUS: CPT

## 2020-10-25 PROCEDURE — 85025 COMPLETE CBC W/AUTO DIFF WBC: CPT

## 2020-10-25 RX ORDER — FLUCONAZOLE 40 MG/ML
200 POWDER, FOR SUSPENSION ORAL ONCE
Status: COMPLETED | OUTPATIENT
Start: 2020-10-25 | End: 2020-10-25

## 2020-10-25 RX ORDER — PROPRANOLOL HYDROCHLORIDE 20 MG/5ML
30 SOLUTION ORAL 3 TIMES DAILY
Status: DISCONTINUED | OUTPATIENT
Start: 2020-10-25 | End: 2020-11-03 | Stop reason: HOSPADM

## 2020-10-25 RX ORDER — POTASSIUM CHLORIDE 1.5 G/1.58G
40 POWDER, FOR SOLUTION ORAL ONCE
Status: COMPLETED | OUTPATIENT
Start: 2020-10-25 | End: 2020-10-25

## 2020-10-25 RX ORDER — FLUCONAZOLE 40 MG/ML
100 POWDER, FOR SUSPENSION ORAL DAILY
Status: DISCONTINUED | OUTPATIENT
Start: 2020-10-26 | End: 2020-10-30

## 2020-10-25 RX ORDER — VANCOMYCIN HCL IN 5 % DEXTROSE 1G/250ML
1000 PLASTIC BAG, INJECTION (ML) INTRAVENOUS
Status: DISCONTINUED | OUTPATIENT
Start: 2020-10-25 | End: 2020-10-26

## 2020-10-25 RX ORDER — IPRATROPIUM BROMIDE AND ALBUTEROL SULFATE 2.5; .5 MG/3ML; MG/3ML
3 SOLUTION RESPIRATORY (INHALATION) EVERY 6 HOURS PRN
Status: DISCONTINUED | OUTPATIENT
Start: 2020-10-25 | End: 2020-11-03 | Stop reason: HOSPADM

## 2020-10-25 RX ORDER — IPRATROPIUM BROMIDE AND ALBUTEROL SULFATE 2.5; .5 MG/3ML; MG/3ML
3 SOLUTION RESPIRATORY (INHALATION) EVERY 6 HOURS
Status: DISCONTINUED | OUTPATIENT
Start: 2020-10-25 | End: 2020-10-25

## 2020-10-25 RX ADMIN — CIPROFLOXACIN 400 MG: 2 INJECTION, SOLUTION INTRAVENOUS at 05:10

## 2020-10-25 RX ADMIN — PIPERACILLIN AND TAZOBACTAM 4.5 G: 4; .5 INJECTION, POWDER, LYOPHILIZED, FOR SOLUTION INTRAVENOUS; PARENTERAL at 03:10

## 2020-10-25 RX ADMIN — VANCOMYCIN HYDROCHLORIDE 1000 MG: 1 INJECTION, POWDER, LYOPHILIZED, FOR SOLUTION INTRAVENOUS at 05:10

## 2020-10-25 RX ADMIN — BACLOFEN 10 MG: 10 TABLET ORAL at 03:10

## 2020-10-25 RX ADMIN — PROPRANOLOL HYDROCHLORIDE 30 MG: 20 SOLUTION ORAL at 03:10

## 2020-10-25 RX ADMIN — SODIUM CHLORIDE 30 MG/ML INHALATION SOLUTION 4 ML: 30 SOLUTION INHALANT at 12:10

## 2020-10-25 RX ADMIN — FAMOTIDINE 20 MG: 10 INJECTION INTRAVENOUS at 09:10

## 2020-10-25 RX ADMIN — SODIUM CHLORIDE 30 MG/ML INHALATION SOLUTION 4 ML: 30 SOLUTION INHALANT at 07:10

## 2020-10-25 RX ADMIN — CHLORHEXIDINE GLUCONATE 0.12% ORAL RINSE 15 ML: 1.2 LIQUID ORAL at 08:10

## 2020-10-25 RX ADMIN — PIPERACILLIN AND TAZOBACTAM 4.5 G: 4; .5 INJECTION, POWDER, LYOPHILIZED, FOR SOLUTION INTRAVENOUS; PARENTERAL at 07:10

## 2020-10-25 RX ADMIN — BACLOFEN 10 MG: 10 TABLET ORAL at 08:10

## 2020-10-25 RX ADMIN — SODIUM CHLORIDE 30 MG/ML INHALATION SOLUTION 4 ML: 30 SOLUTION INHALANT at 03:10

## 2020-10-25 RX ADMIN — PROPRANOLOL HYDROCHLORIDE 20 MG: 20 SOLUTION ORAL at 08:10

## 2020-10-25 RX ADMIN — MUPIROCIN: 20 OINTMENT TOPICAL at 09:10

## 2020-10-25 RX ADMIN — CHLORHEXIDINE GLUCONATE 0.12% ORAL RINSE 15 ML: 1.2 LIQUID ORAL at 09:10

## 2020-10-25 RX ADMIN — ENOXAPARIN SODIUM 30 MG: 100 INJECTION SUBCUTANEOUS at 04:10

## 2020-10-25 RX ADMIN — FAMOTIDINE 20 MG: 10 INJECTION INTRAVENOUS at 08:10

## 2020-10-25 RX ADMIN — BACLOFEN 10 MG: 10 TABLET ORAL at 09:10

## 2020-10-25 RX ADMIN — MICONAZOLE NITRATE: 2 OINTMENT TOPICAL at 09:10

## 2020-10-25 RX ADMIN — GABAPENTIN 250 MG: 250 SUSPENSION ORAL at 09:10

## 2020-10-25 RX ADMIN — PHENOBARBITAL 100 MG: 20 ELIXIR ORAL at 09:10

## 2020-10-25 RX ADMIN — FLUCONAZOLE 200 MG: 40 POWDER, FOR SUSPENSION ORAL at 03:10

## 2020-10-25 RX ADMIN — IPRATROPIUM BROMIDE AND ALBUTEROL SULFATE 3 ML: .5; 3 SOLUTION RESPIRATORY (INHALATION) at 05:10

## 2020-10-25 RX ADMIN — IPRATROPIUM BROMIDE AND ALBUTEROL SULFATE 3 ML: .5; 3 SOLUTION RESPIRATORY (INHALATION) at 12:10

## 2020-10-25 RX ADMIN — MUPIROCIN: 20 OINTMENT TOPICAL at 08:10

## 2020-10-25 RX ADMIN — PROPRANOLOL HYDROCHLORIDE 30 MG: 20 SOLUTION ORAL at 09:10

## 2020-10-25 RX ADMIN — POTASSIUM CHLORIDE 40 MEQ: 1.5 POWDER, FOR SOLUTION ORAL at 07:10

## 2020-10-25 RX ADMIN — PIPERACILLIN AND TAZOBACTAM 4.5 G: 4; .5 INJECTION, POWDER, LYOPHILIZED, FOR SOLUTION INTRAVENOUS; PARENTERAL at 10:10

## 2020-10-25 NOTE — PLAN OF CARE
Assumed care of patient after completing bedside handoff.  Lines, drips, alarms, and monitors checked.  Physical assessment completed, immediate patient needs addressed.      Switched restraints from soft wrist to mittens, patient more comfortable and less agitated although he is still intermittently grinding his teeth.      Patient has had multiple large loose/liquid bowel movements today and is stooling into his wounds requiring multiple dressing changes.  Wound bed changes noted during dressing change, provider notified.  Rectal tube placed however patient is stooling around it.

## 2020-10-25 NOTE — ASSESSMENT & PLAN NOTE
Appreciate WOC eval, follow care recs  10/25 - increased drainage and frequent stool contamination per nursing staff - place fecal management system and plan discuss with WOC tomorrow, could benefit from wound vac placement

## 2020-10-25 NOTE — ASSESSMENT & PLAN NOTE
Vent settings reviewed and adjusted to optimize gas exchange  VAP prophylaxis  10/23 - decreased oxygen demand and lung mechanics today; strong suspicion for inability to protect airway + thick secretions, will add hypertonic saline nebs and maintain intubation today  10/24 improved continue therapy - self exbtubated  10/25 - supplemental oxygen to keep sat > 92; recommendation for trach placement for aspiration protection unchanged

## 2020-10-25 NOTE — SUBJECTIVE & OBJECTIVE
Interval History:     Review of Systems   Unable to perform ROS: Intubated     Objective:     Vital Signs (Most Recent):  Temp: 98.9 °F (37.2 °C) (10/25/20 0315)  Pulse: (!) 119 (10/25/20 0736)  Resp: 20 (10/25/20 0736)  BP: 121/77 (10/25/20 0500)  SpO2: 100 % (10/25/20 0736) Vital Signs (24h Range):  Temp:  [98.8 °F (37.1 °C)-99.9 °F (37.7 °C)] 98.9 °F (37.2 °C)  Pulse:  [] 119  Resp:  [18-60] 20  SpO2:  [94 %-100 %] 100 %  BP: ()/(56-89) 121/77     Weight: 34.3 kg (75 lb 9.9 oz)  Body mass index is 16.36 kg/m².    Intake/Output Summary (Last 24 hours) at 10/25/2020 1111  Last data filed at 10/25/2020 0500  Gross per 24 hour   Intake 2820.8 ml   Output 4270 ml   Net -1449.2 ml      Physical Exam  Vitals signs and nursing note reviewed.   Constitutional:       Appearance: Normal appearance.   HENT:      Head: Normocephalic and atraumatic.      Nose: Nose normal.   Eyes:      Extraocular Movements: Extraocular movements intact.      Conjunctiva/sclera: Conjunctivae normal.      Pupils: Pupils are equal, round, and reactive to light.   Cardiovascular:      Rate and Rhythm: Tachycardia present.      Pulses: Normal pulses.      Heart sounds: Normal heart sounds.   Pulmonary:      Breath sounds: No wheezing or rhonchi.      Comments: INTUBATED  Abdominal:      General: Abdomen is flat. Bowel sounds are normal.      Palpations: Abdomen is soft.   Musculoskeletal:         General: Deformity present.   Skin:     Comments: Bilateral ischial wounds , Stage  IV    Neurological:      General: No focal deficit present.      Mental Status: He is alert.   Psychiatric:      Comments: SEDATED ON VENT          Significant Labs:   BMP:   Recent Labs   Lab 10/25/20  0442   *      K 3.6      CO2 29   BUN 5*   CREATININE 0.5   CALCIUM 8.0*   MG 2.0     CBC:   Recent Labs   Lab 10/23/20  1348 10/24/20  0425 10/25/20  0442   WBC 15.31* 9.82 10.77   HGB 7.4* 7.1* 9.5*   HCT 24.1* 23.1* 30.2*   * 436*  533*       Significant Imaging: I have reviewed all pertinent imaging results/findings within the past 24 hours.

## 2020-10-25 NOTE — PROGRESS NOTES
[facility] Hospital Medicine  Progress Note    Patient Name: Glen Moscoso  MRN: [id number]  Patient Class: IP- Inpatient   Admission Date: 10/22/2020  Length of Stay: 3 days  Attending Physician: Smitha Smith MD  Primary Care Provider: Yadi Lawson MD        Subjective:     Principal Problem:Acute hypoxemic respiratory failure        HPI:  Glen Moscoso is a 25 y.o. male patient with a PMHx of cerebral palsy who presents to the Emergency Department for evaluation of respiratory distress which onset PTA. EMS reports pt was found face down on the pillow in nursing home. Pt arrives tachycardic and tachypneic. Patient recently discharge from [facility] where he was treated for respiratory failure and sepsis. [facility], Cefepime and Flagyl to treat pelvic osteomyelitis and resolving colitis. In the ED, WBCs 18.7K, Platelet 681, CO2 20, Lactate 2.3. UA negative. CXR revealed mild atelectasis or infiltrate right perihilar region and right midlung zone. Pt with worsening resp failure Subsequently pt was intubated. Sepsis protocol initiated in the ED including IV hydration. Hospital medicine called for admission. Patient placed in ICU. Patient is a full code, SDM mother, Leni Moscoso at [phone].     Overview/Hospital Course:  10/23  Remains intubated , enteral nutrition initiated. Continued treatment of sepsis due to aspiration pneumonia .due to poor air way protection .  Cultures are with negative growth to date. Temp Max 103 and wbc 19.30   Tachycardia was reported over night , follow up Echo EF 50 % normal diastolic function.  Case discussed with Dr Palma who has agreed for trach placement due to recurrent aspiration pneumonia.    10/24    Remains intubated, Tmax of 101.5 .CT Abd/pelvis - right inferior pubic ramus -cannot exclude     Osteomyelitis.      10/25   Self extubated, presently on Room air    Case discussed with Dr Palma who  has agreed for trach placement due to recurrent aspiration pneumonia.      Interval History:     Review of Systems   Unable to perform ROS: Intubated     Objective:     Vital Signs (Most Recent):  Temp: 98.9 °F (37.2 °C) (10/25/20 0315)  Pulse: (!) 119 (10/25/20 0736)  Resp: 20 (10/25/20 0736)  BP: 121/77 (10/25/20 0500)  SpO2: 100 % (10/25/20 0736) Vital Signs (24h Range):  Temp:  [98.8 °F (37.1 °C)-99.9 °F (37.7 °C)] 98.9 °F (37.2 °C)  Pulse:  [] 119  Resp:  [18-60] 20  SpO2:  [94 %-100 %] 100 %  BP: ()/(56-89) 121/77     Weight: 34.3 kg (75 lb 9.9 oz)  Body mass index is 16.36 kg/m².    Intake/Output Summary (Last 24 hours) at 10/25/2020 1111  Last data filed at 10/25/2020 0500  Gross per 24 hour   Intake 2820.8 ml   Output 4270 ml   Net -1449.2 ml      Physical Exam  Vitals signs and nursing note reviewed.   Constitutional:       Appearance: Normal appearance.   HENT:      Head: Normocephalic and atraumatic.      Nose: Nose normal.   Eyes:      Extraocular Movements: Extraocular movements intact.      Conjunctiva/sclera: Conjunctivae normal.      Pupils: Pupils are equal, round, and reactive to light.   Cardiovascular:      Rate and Rhythm: Tachycardia present.      Pulses: Normal pulses.      Heart sounds: Normal heart sounds.   Pulmonary:      Breath sounds: No wheezing or rhonchi.      Comments: INTUBATED  Abdominal:      General: Abdomen is flat. Bowel sounds are normal.      Palpations: Abdomen is soft.   Musculoskeletal:         General: Deformity present.   Skin:     Comments: Bilateral ischial wounds , Stage  IV    Neurological:      General: No focal deficit present.      Mental Status: He is alert.   Psychiatric:      Comments: SEDATED ON VENT          Significant Labs:   BMP:   Recent Labs   Lab 10/25/20  0442   *      K 3.6      CO2 29   BUN 5*   CREATININE 0.5   CALCIUM 8.0*   MG 2.0     CBC:   Recent Labs   Lab 10/23/20  1348 10/24/20  0425 10/25/20  0442   WBC 15.31*  9.82 10.77   HGB 7.4* 7.1* 9.5*   HCT 24.1* 23.1* 30.2*   * 436* 533*       Significant Imaging: I have reviewed all pertinent imaging results/findings within the past 24 hours.      Assessment/Plan:      * Acute hypoxemic respiratory failure  Due Aspiraton Pneumonia   Presently on room air     Right lower lobe pneumonia  Recurrent Aspiration Pneumonia  Trach placement -pending for 10/28/20 with Dr Palma      Tachycardia  Due to infection  ABX  Symptomatic care  Monitor        Osteomyelitis of pelvis  ABX  Wound Care  ID on COnsult      Severe protein-calorie malnutrition  Enteral tube feeds   Continue supplements        Decubitus ulcer of ischial area, left, stage IV  Wound Care  Antibiotics per ID  PICC in place  Possible osteomyelitis       Seizure disorder  Phenobarbital  Neuro checks    Cerebral palsy  Supportive care       VTE Risk Mitigation (From admission, onward)         Ordered     enoxaparin injection 30 mg  Every 24 hours      10/23/20 0907     IP VTE HIGH RISK PATIENT  Once      10/22/20 1825     Place sequential compression device  Until discontinued      10/22/20 1754                Discharge Planning   ROCÍO:      Code Status: Full Code   Is the patient medically ready for discharge?:     Reason for patient still in hospital (select all that apply): Patient unstable  Discharge Plan A: Long-term acute care facility (LTAC)            Critical care time spent on the evaluation and treatment of severe organ dysfunction, review of pertinent labs and imaging studies, discussions with consulting providers and discussions with patient/family: 40 minutes.      Smitha Smith MD  Department of Hospital Medicine   [facility]

## 2020-10-25 NOTE — PROGRESS NOTES
Pharmacokinetic Assessment Follow Up: IV Vancomycin    Vancomycin serum concentration assessment(s):  Vancomycin trough yesterday @ 1625 = 14.0 mcg/ml  Repeat trough @ 0442 this AM (collected on time) = 11.8 mcg/ml (trended down & subtherapeutic)     Vancomycin Regimen Plan:  Dose increased from 750 mg IV every 12 hours to 1 gm IV every 12 hours  Next trough due tomorrow 10/26 @ 1645 (1 hr before 4th dose)  Goal trough: 15-20 mcg/ml    Drug levels (last 3 results):  Recent Labs   Lab Result Units 10/23/20  1516 10/24/20  1625 10/25/20  0442   Vancomycin-Trough ug/mL 17.6 14.0 11.8       Pharmacy will continue to follow and monitor vancomycin.    Please contact pharmacy at extension [phone] for questions regarding this assessment.    Thank you for the consult,   Katherine McArdle, Pharm.D. 10/25/2020 6:28 AM      Patient brief summary:  Glen Moscoso is a 25 y.o. male initiated on antimicrobial therapy with IV Vancomycin for treatment of bone/joint infection, pneumonia    The patient's current regimen is 1 gm IV every 12 hours     Drug Allergies:   Review of patient's allergies indicates:  No Known Allergies    Actual Body Weight:   34.3 kg    Renal Function:   Estimated Creatinine Clearance: 111.5 mL/min (based on SCr of 0.5 mg/dL). -- stable      CBC (last 72 hours):  Recent Labs   Lab Result Units 10/22/20  1458 10/23/20  0459 10/23/20  1348 10/24/20  0425 10/25/20  0442   WBC K/uL 18.73* 19.36* 15.31* 9.82 10.77   Hemoglobin g/dL 11.2* 7.8* 7.4* 7.1* 9.5*   Hematocrit % 36.3* 24.8* 24.1* 23.1* 30.2*   Platelets K/uL 681* 517* 442* 436* 533*   Gran% % 86.2* 81.3* 81.5* 67.9 79.1*   Lymph% % 5.2* 9.0* 8.6* 12.4* 8.3*   Mono% % 7.8 7.3 8.8 12.0 11.8   Eosinophil% % 0.1 1.5 0.1 6.9 0.0   Basophil% % 0.1 0.2 0.2 0.1 0.2   Differential Method  Automated Automated Automated Automated Automated       Metabolic Panel (last 72 hours):  Recent Labs   Lab Result Units 10/22/20  1458 10/22/20  2443 10/23/20  5432  10/24/20  0425 10/25/20  0442   Sodium mmol/L 136  --  137 136 142   Potassium mmol/L 4.1  --  3.5 3.6 3.6   Chloride mmol/L 105  --  104 104 107   CO2 mmol/L 20*  --  24 26 29   Glucose mg/dL 109  --  103 107 113*   Glucose, UA   --  Negative  --   --   --    BUN, Bld mg/dL 17  --  9 5* 5*   Creatinine mg/dL 0.5  --  0.4* 0.5 0.5   Albumin g/dL 2.4*  --  1.8* 1.7* 1.9*   Total Bilirubin mg/dL 0.1  --  0.2 0.1 0.3   Alkaline Phosphatase U/L 114  --  162* 142* 123   AST U/L 17  --  65* 20 13   ALT U/L 12  --  27 19 13   Magnesium mg/dL  --   --  1.6 2.1 2.0   Phosphorus mg/dL  --   --  3.0 3.2 2.4*       Vancomycin Administrations:  vancomycin given in the last 96 hours                   vancomycin in dextrose 5 % 1 gram/250 mL IVPB 1,000 mg (mg) 1,000 mg New Bag 10/25/20 0543    vancomycin 750 mg in dextrose 5 % 250 mL IVPB (ready to mix system) (mg) 750 mg New Bag 10/24/20 1733     750 mg New Bag  0542     750 mg New Bag 10/23/20 1703    vancomycin in dextrose 5 % 1 gram/250 mL IVPB 1,000 mg (mg) 1,000 mg New Bag 10/23/20 0447     1,000 mg New Bag 10/22/20 1625                Microbiologic Results:  Microbiology Results (last 7 days)     Procedure Component Value Units Date/Time    Blood culture x two cultures. Draw prior to antibiotics. [[id number]] Collected: 10/22/20 1545    Order Status: Completed Specimen: Blood from Peripheral, Upper Arm, Right Updated: 10/24/20 2312     Blood Culture, Routine No Growth to date      No Growth to date      No Growth to date    Narrative:      Aerobic and anaerobic    Blood culture x two cultures. Draw prior to antibiotics. [[id number]] Collected: 10/22/20 1530    Order Status: Completed Specimen: Blood from Peripheral, Upper Arm, Right Updated: 10/24/20 2312     Blood Culture, Routine No Growth to date      No Growth to date      No Growth to date    Narrative:      Aerobic and anaerobic    Culture, Respiratory with Gram Stain [[id number]]     Order Status: No result Specimen:  Respiratory from Endotracheal Aspirate

## 2020-10-25 NOTE — PROGRESS NOTES
[facility] -   Critical Care Medicine  Progress Note    Patient Name: Glen Moscoso  MRN: [id number]  Admission Date: 10/22/2020  Hospital Length of Stay: 3 days  Code Status: Full Code  Attending Provider: Smitha Smith MD  Primary Care Provider: Yadi Lawson MD   Principal Problem: Acute hypoxemic respiratory failure    Subjective:     HPI:  25 year old male with spastic cerebral palsy, severe contractures, seizure disorder, malnutrition  Presented to ED from LTAC when found face down, unresponsive, with hypoxia  OF NOTE - recent hospitalizations x 2 for pneumonia (serratia), second hospitalization complicated with osteomyelitis from sacral decubitus (underwent surgical I&D) and pneumoperitoneum from suspected colonic perforation that was managed conservatively    On arrival today respiratory rate 50, , sat 89%, SBP 170s, and fever 102  Intubated on ED arrival with anesthesia assistance  Labs revealed leukocytosis, lactic acid 2.3,and procalcitonin 0.12  CT chest abd pelvis revealed - Right lower lobe probable pneumonia.  Osseous erosion could appear similarly but is less favored.  Additional scattered areas of consolidation in the right lung likely also related to infection.  There is a large decubitus ulcer right greater than left. The right ulcer tracks to the posterior aspect of the right inferior pubic ramus. Osteomyelitis cannot be excluded.     Hospital/ICU Course:  Arrived to ICU with OETT to mechanical ventilation, propofol sedation  10/23 - remains intubated on mechanical ventilation; oxygen demand decreased; large amount of thick secretions from trach and oral pharynx  10/24 - Intubated, improved aeration of lung  10/25 - self extubated yesterday; self removed midline overnight    Review of Systems   Unable to perform ROS: Patient nonverbal     Objective:     Vital Signs (Most Recent):  Temp: 98.9 °F (37.2 °C) (10/25/20 0315)  Pulse: (!) 119 (10/25/20 0736)  Resp: 20 (10/25/20  0736)  BP: 121/77 (10/25/20 0500)  SpO2: 100 % (10/25/20 0736) Vital Signs (24h Range):  Temp:  [98.8 °F (37.1 °C)-99.9 °F (37.7 °C)] 98.9 °F (37.2 °C)  Pulse:  [] 119  Resp:  [18-60] 20  SpO2:  [94 %-100 %] 100 %  BP: ()/(56-89) 121/77     Weight: 34.3 kg (75 lb 9.9 oz)  Body mass index is 16.36 kg/m².      Intake/Output Summary (Last 24 hours) at 10/25/2020 1029  Last data filed at 10/25/2020 0500  Gross per 24 hour   Intake 2883.9 ml   Output 4420 ml   Net -1536.1 ml         Physical Exam  Vitals signs and nursing note reviewed.   Constitutional:       Appearance: He is underweight. He is ill-appearing.   HENT:      Head: Atraumatic.      Mouth/Throat:      Mouth: Mucous membranes are moist.   Eyes:      Conjunctiva/sclera: Conjunctivae normal.      Pupils: Pupils are equal, round, and reactive to light.   Neck:      Musculoskeletal: No edema.      Vascular: No JVD.      Comments: Severe bilateral upper and lower extremity contractures  Cardiovascular:      Rate and Rhythm: Regular rhythm. Tachycardia present.      Pulses:           Radial pulses are 2+ on the right side and 2+ on the left side.        Dorsalis pedis pulses are 1+ on the right side and 1+ on the left side.   Pulmonary:      Breath sounds: Decreased air movement present. Decreased breath sounds present. No wheezing, rhonchi or rales.   Abdominal:      General: Abdomen is protuberant. Bowel sounds are normal. There is no distension.      Palpations: Abdomen is soft.      Tenderness: There is no abdominal tenderness.      Comments: Scarring around button PEG   Musculoskeletal:      Right lower leg: No edema.      Left lower leg: No edema.   Skin:     General: Skin is warm and dry.      Capillary Refill: Capillary refill takes 2 to 3 seconds.          Neurological:      GCS: GCS eye subscore is 3. GCS verbal subscore is 1. GCS motor subscore is 1.           Lines/Drains/Airways     Peripherally Inserted Central Catheter Line             PICC Double Lumen 10/22/20 1300 2 days          Drain                 Gastrostomy/Enterostomy 08/04/20 1653 Percutaneous endoscopic gastrostomy (PEG) feeding 81 days         Gastrostomy/Enterostomy 10/22/20 1300 LUQ 2 days         Urethral Catheter 10/22/20 1653 Latex 16 Fr. 2 days          Airway                 Airway - Non-Surgical 10/22/20 1430 Endotracheal Tube 2 days          Peripheral Intravenous Line                 Midline Catheter Insertion/Assessment  - Single Lumen 10/22/20 1548 Right basilic vein (medial side of arm) 20g x 8cm 2 days                Significant Labs:    CBC/Anemia Profile:  Recent Labs   Lab 10/23/20  1348 10/24/20  0425 10/25/20  0442   WBC 15.31* 9.82 10.77   HGB 7.4* 7.1* 9.5*   HCT 24.1* 23.1* 30.2*   * 436* 533*   MCV 94 94 92   RDW 13.9 13.8 14.0        Chemistries:  Recent Labs   Lab 10/24/20  0425 10/25/20  0442    142   K 3.6 3.6    107   CO2 26 29   BUN 5* 5*   CREATININE 0.5 0.5   CALCIUM 7.9* 8.0*   ALBUMIN 1.7* 1.9*   PROT 5.7* 6.4   BILITOT 0.1 0.3   ALKPHOS 142* 123   ALT 19 13   AST 20 13   MG 2.1 2.0   PHOS 3.2 2.4*       All pertinent labs within the past 24 hours have been reviewed.    Significant Imaging:  I have reviewed all pertinent imaging results/findings within the past 24 hours.      ABG  Recent Labs   Lab 10/24/20  0451   PH 7.398   PO2 140*   PCO2 40.4   HCO3 24.9   BE 0     Assessment/Plan:     Neuro  Seizure disorder  Continue home dose phenobarbital    Cerebral palsy  continue baclofen, neurontin    Derm  Decubitus ulcer of ischial area, left, stage IV  Appreciate [facility] eval, follow care recs  10/25 - increased drainage and frequent stool contamination per nursing staff - place fecal management system and plan discuss with [facility] tomorrow, could benefit from wound vac placement    Pulmonary  * Acute hypoxemic respiratory failure  Vent settings reviewed and adjusted to optimize gas exchange  VAP prophylaxis  10/23 - decreased oxygen demand  and lung mechanics today; strong suspicion for inability to protect airway + thick secretions, will add hypertonic saline nebs and maintain intubation today  10/24 improved continue therapy - self exbtubated  10/25 - supplemental oxygen to keep sat > 92; recommendation for trach placement for aspiration protection unchanged    Right lower lobe pneumonia  Recent flouroquinolone resistant seratia pneumonia  Empiric vanc, zosyn  Culture endotracheal aspirate  Monitor temp, wbc, culture data  Recommend trach placement for airway protection from recurrent aspiration pneumonias; discussed with mother and answered all questions. Consulted ENT and spoke with Dr Palma who will plan for trach placement on Tuesday 10/27/20 at 8am  10/25 - self extubated and no indication for reintubation now but trach placement for airway protection remains recommendation     Cardiac/Vascular  Tachycardia  Suspect baseline sinus tachycardia exacerbated by fever, sepsis  Holding enteral propanolol given marginal BP  Optimize analgesia, sedation  10/23 - resume home dose propanolol    ID  Osteomyelitis of pelvis  Transferred to LTAC on 10/10 with plan for IV abx and recs to continue until 10/22  Currently on abx coverage for aspiration pneumonia   If concern for ongoing osteo, will likely need MRI    Endocrine  Severe protein-calorie malnutrition  TF per RD recs      Critical Care Daily Checklist:    A: Awake: RASS Goal/Actual Goal: RASS Goal: -1-->drowsy  Actual: [place] Agitation Sedation Scale (RASS): Drowsy   B: Spontaneous Breathing Trial Performed? Spon. Breathing Trial Initiated?: (S) Initiated (10/23/20 0800)   C: SAT & SBT Coordinated?  yes                      D: Delirium: CAM-ICU Overall CAM-ICU: Positive   E: Early Mobility Performed? Yes   F: Feeding Goal: Goals: Meet >75% EEN/EPN by RD f/u  Status: Nutrition Goal Status: new   Current Diet Order   Procedures    Diet NPO      AS: Analgesia/Sedation prn   T: Thromboembolic  Prophylaxis lovenox   H: HOB > 300 Yes   U: Stress Ulcer Prophylaxis (if needed) pepcid   G: Glucose Control monitoring   B: Bowel Function Stool Occurrence: 1   I: Indwelling Catheter (Lines & Iglesias) Necessity reviewed   D: De-escalation of Antimicrobials/Pharmacotherapies reviewed    Plan for the day/ETD As above    Code Status:  Family/Goals of Care: Full Code  Anticipate LTAC on discharge   I have discussed case and plan of care in detail with Dr Garcia; Status and plan of care were discussed with team on multidisciplinary rounds.    Critical Care Time: 47 minutes  Critical secondary to acute hypoxemic respiratory failure; Critical care was time spent personally by me on the following activities: development of treatment plan with patient or surrogate and bedside caregivers, discussions with consultants, evaluation of patient's response to treatment, examination of patient, ordering and performing treatments and interventions, ordering and review of laboratory studies, ordering and review of radiographic studies, pulse oximetry, re-evaluation of patient's condition. This critical care time did not overlap with that of any other provider or involve time for any procedures.     JEFF Panchal-BC  Critical Care Medicine  [facility]

## 2020-10-25 NOTE — ASSESSMENT & PLAN NOTE
Recent flouroquinolone resistant seratia pneumonia  Empiric vanc, zosyn  Culture endotracheal aspirate  Monitor temp, wbc, culture data  Recommend trach placement for airway protection from recurrent aspiration pneumonias; discussed with mother and answered all questions. Consulted ENT and spoke with Dr Palma who will plan for trach placement on Tuesday 10/27/20 at 8am  10/25 - self extubated and no indication for reintubation now but trach placement for airway protection remains recommendation

## 2020-10-25 NOTE — SUBJECTIVE & OBJECTIVE
Review of Systems   Unable to perform ROS: Patient nonverbal     Objective:     Vital Signs (Most Recent):  Temp: 98.9 °F (37.2 °C) (10/25/20 0315)  Pulse: (!) 119 (10/25/20 0736)  Resp: 20 (10/25/20 0736)  BP: 121/77 (10/25/20 0500)  SpO2: 100 % (10/25/20 0736) Vital Signs (24h Range):  Temp:  [98.8 °F (37.1 °C)-99.9 °F (37.7 °C)] 98.9 °F (37.2 °C)  Pulse:  [] 119  Resp:  [18-60] 20  SpO2:  [94 %-100 %] 100 %  BP: ()/(56-89) 121/77     Weight: 34.3 kg (75 lb 9.9 oz)  Body mass index is 16.36 kg/m².      Intake/Output Summary (Last 24 hours) at 10/25/2020 1029  Last data filed at 10/25/2020 0500  Gross per 24 hour   Intake 2883.9 ml   Output 4420 ml   Net -1536.1 ml         Physical Exam  Vitals signs and nursing note reviewed.   Constitutional:       Appearance: He is underweight. He is ill-appearing.   HENT:      Head: Atraumatic.      Mouth/Throat:      Mouth: Mucous membranes are moist.   Eyes:      Conjunctiva/sclera: Conjunctivae normal.      Pupils: Pupils are equal, round, and reactive to light.   Neck:      Musculoskeletal: No edema.      Vascular: No JVD.      Comments: Severe bilateral upper and lower extremity contractures  Cardiovascular:      Rate and Rhythm: Regular rhythm. Tachycardia present.      Pulses:           Radial pulses are 2+ on the right side and 2+ on the left side.        Dorsalis pedis pulses are 1+ on the right side and 1+ on the left side.   Pulmonary:      Breath sounds: Decreased air movement present. Decreased breath sounds present. No wheezing, rhonchi or rales.   Abdominal:      General: Abdomen is protuberant. Bowel sounds are normal. There is no distension.      Palpations: Abdomen is soft.      Tenderness: There is no abdominal tenderness.      Comments: Scarring around button PEG   Musculoskeletal:      Right lower leg: No edema.      Left lower leg: No edema.   Skin:     General: Skin is warm and dry.      Capillary Refill: Capillary refill takes 2 to 3 seconds.           Neurological:      GCS: GCS eye subscore is 3. GCS verbal subscore is 1. GCS motor subscore is 1.           Lines/Drains/Airways     Peripherally Inserted Central Catheter Line            PICC Double Lumen 10/22/20 1300 2 days          Drain                 Gastrostomy/Enterostomy 08/04/20 1653 Percutaneous endoscopic gastrostomy (PEG) feeding 81 days         Gastrostomy/Enterostomy 10/22/20 1300 LUQ 2 days         Urethral Catheter 10/22/20 1653 Latex 16 Fr. 2 days          Airway                 Airway - Non-Surgical 10/22/20 1430 Endotracheal Tube 2 days          Peripheral Intravenous Line                 Midline Catheter Insertion/Assessment  - Single Lumen 10/22/20 1548 Right basilic vein (medial side of arm) 20g x 8cm 2 days                Significant Labs:    CBC/Anemia Profile:  Recent Labs   Lab 10/23/20  1348 10/24/20  0425 10/25/20  0442   WBC 15.31* 9.82 10.77   HGB 7.4* 7.1* 9.5*   HCT 24.1* 23.1* 30.2*   * 436* 533*   MCV 94 94 92   RDW 13.9 13.8 14.0        Chemistries:  Recent Labs   Lab 10/24/20  0425 10/25/20  0442    142   K 3.6 3.6    107   CO2 26 29   BUN 5* 5*   CREATININE 0.5 0.5   CALCIUM 7.9* 8.0*   ALBUMIN 1.7* 1.9*   PROT 5.7* 6.4   BILITOT 0.1 0.3   ALKPHOS 142* 123   ALT 19 13   AST 20 13   MG 2.1 2.0   PHOS 3.2 2.4*       All pertinent labs within the past 24 hours have been reviewed.    Significant Imaging:  I have reviewed all pertinent imaging results/findings within the past 24 hours.

## 2020-10-25 NOTE — NURSING
1640:  MD notified patient breaking out in a rash on his chest.  He has a yeast infection on multiple areas of his body but rash on his chest looks different.  No new medications ordered.  Patient also began dumping urine over the last couple hours, but amount out is starting to slow.  No new orders given.    1800:  MD notified patient needs order for venti mask.

## 2020-10-25 NOTE — PLAN OF CARE
No acute events overnight. Patient was restless throughout the night, spending most of his time moving his head from side to side to remove the venti mask. Although restrained, patient managed to wiggle enough that his midline came out. Two brown BM's with soft/mucoid consistency. Safety measures continued.

## 2020-10-26 ENCOUNTER — ANESTHESIA EVENT (OUTPATIENT)
Dept: SURGERY | Facility: HOSPITAL | Age: 25
DRG: 003 | End: 2020-10-26
Payer: MEDICAID

## 2020-10-26 LAB
ALBUMIN SERPL BCP-MCNC: 1.9 G/DL (ref 3.5–5.2)
ALP SERPL-CCNC: 108 U/L (ref 55–135)
ALT SERPL W/O P-5'-P-CCNC: 11 U/L (ref 10–44)
ANION GAP SERPL CALC-SCNC: 6 MMOL/L (ref 8–16)
AST SERPL-CCNC: 15 U/L (ref 10–40)
BASOPHILS # BLD AUTO: 0.02 K/UL (ref 0–0.2)
BASOPHILS NFR BLD: 0.2 % (ref 0–1.9)
BILIRUB SERPL-MCNC: 0.1 MG/DL (ref 0.1–1)
BUN SERPL-MCNC: 3 MG/DL (ref 6–20)
CALCIUM SERPL-MCNC: 8.7 MG/DL (ref 8.7–10.5)
CHLORIDE SERPL-SCNC: 106 MMOL/L (ref 95–110)
CO2 SERPL-SCNC: 29 MMOL/L (ref 23–29)
CREAT SERPL-MCNC: 0.5 MG/DL (ref 0.5–1.4)
DIFFERENTIAL METHOD: ABNORMAL
EOSINOPHIL # BLD AUTO: 0.5 K/UL (ref 0–0.5)
EOSINOPHIL NFR BLD: 5 % (ref 0–8)
ERYTHROCYTE [DISTWIDTH] IN BLOOD BY AUTOMATED COUNT: 13.8 % (ref 11.5–14.5)
EST. GFR  (AFRICAN AMERICAN): >60 ML/MIN/1.73 M^2
EST. GFR  (NON AFRICAN AMERICAN): >60 ML/MIN/1.73 M^2
GLUCOSE SERPL-MCNC: 98 MG/DL (ref 70–110)
HCT VFR BLD AUTO: 29.4 % (ref 40–54)
HGB BLD-MCNC: 9.2 G/DL (ref 14–18)
IMM GRANULOCYTES # BLD AUTO: 0.07 K/UL (ref 0–0.04)
IMM GRANULOCYTES NFR BLD AUTO: 0.7 % (ref 0–0.5)
LYMPHOCYTES # BLD AUTO: 1.3 K/UL (ref 1–4.8)
LYMPHOCYTES NFR BLD: 12.9 % (ref 18–48)
MAGNESIUM SERPL-MCNC: 2.6 MG/DL (ref 1.6–2.6)
MCH RBC QN AUTO: 28.8 PG (ref 27–31)
MCHC RBC AUTO-ENTMCNC: 31.3 G/DL (ref 32–36)
MCV RBC AUTO: 92 FL (ref 82–98)
MONOCYTES # BLD AUTO: 1.1 K/UL (ref 0.3–1)
MONOCYTES NFR BLD: 10.5 % (ref 4–15)
NEUTROPHILS # BLD AUTO: 7.2 K/UL (ref 1.8–7.7)
NEUTROPHILS NFR BLD: 70.7 % (ref 38–73)
NRBC BLD-RTO: 0 /100 WBC
PHOSPHATE SERPL-MCNC: 2.8 MG/DL (ref 2.7–4.5)
PLATELET # BLD AUTO: 512 K/UL (ref 150–350)
PMV BLD AUTO: 8.8 FL (ref 9.2–12.9)
POTASSIUM SERPL-SCNC: 3.5 MMOL/L (ref 3.5–5.1)
PROT SERPL-MCNC: 6.5 G/DL (ref 6–8.4)
RBC # BLD AUTO: 3.19 M/UL (ref 4.6–6.2)
SARS-COV-2 RDRP RESP QL NAA+PROBE: NEGATIVE
SODIUM SERPL-SCNC: 141 MMOL/L (ref 136–145)
VANCOMYCIN TROUGH SERPL-MCNC: 21.8 UG/ML (ref 10–22)
WBC # BLD AUTO: 10.14 K/UL (ref 3.9–12.7)

## 2020-10-26 PROCEDURE — U0002 COVID-19 LAB TEST NON-CDC: HCPCS

## 2020-10-26 PROCEDURE — 25000242 PHARM REV CODE 250 ALT 637 W/ HCPCS: Performed by: NURSE PRACTITIONER

## 2020-10-26 PROCEDURE — 25000003 PHARM REV CODE 250: Performed by: NURSE PRACTITIONER

## 2020-10-26 PROCEDURE — 83735 ASSAY OF MAGNESIUM: CPT

## 2020-10-26 PROCEDURE — 63600175 PHARM REV CODE 636 W HCPCS: Performed by: NURSE PRACTITIONER

## 2020-10-26 PROCEDURE — 99900026 HC AIRWAY MAINTENANCE (STAT)

## 2020-10-26 PROCEDURE — 84100 ASSAY OF PHOSPHORUS: CPT

## 2020-10-26 PROCEDURE — 25000003 PHARM REV CODE 250: Performed by: INTERNAL MEDICINE

## 2020-10-26 PROCEDURE — 94640 AIRWAY INHALATION TREATMENT: CPT

## 2020-10-26 PROCEDURE — 63600175 PHARM REV CODE 636 W HCPCS: Performed by: INTERNAL MEDICINE

## 2020-10-26 PROCEDURE — 99233 SBSQ HOSP IP/OBS HIGH 50: CPT | Mod: ,,, | Performed by: NURSE PRACTITIONER

## 2020-10-26 PROCEDURE — 80053 COMPREHEN METABOLIC PANEL: CPT

## 2020-10-26 PROCEDURE — 25000242 PHARM REV CODE 250 ALT 637 W/ HCPCS: Performed by: INTERNAL MEDICINE

## 2020-10-26 PROCEDURE — 85025 COMPLETE CBC W/AUTO DIFF WBC: CPT

## 2020-10-26 PROCEDURE — 20000000 HC ICU ROOM

## 2020-10-26 PROCEDURE — 80202 ASSAY OF VANCOMYCIN: CPT

## 2020-10-26 PROCEDURE — 97605 NEG PRS WND THER DME<=50SQCM: CPT

## 2020-10-26 PROCEDURE — 99233 PR SUBSEQUENT HOSPITAL CARE,LEVL III: ICD-10-PCS | Mod: ,,, | Performed by: NURSE PRACTITIONER

## 2020-10-26 RX ORDER — MORPHINE SULFATE 2 MG/ML
2 INJECTION, SOLUTION INTRAMUSCULAR; INTRAVENOUS EVERY 4 HOURS PRN
Status: DISCONTINUED | OUTPATIENT
Start: 2020-10-26 | End: 2020-11-01

## 2020-10-26 RX ORDER — CEFEPIME HYDROCHLORIDE 1 G/50ML
2 INJECTION, SOLUTION INTRAVENOUS
Status: DISCONTINUED | OUTPATIENT
Start: 2020-10-26 | End: 2020-11-03 | Stop reason: HOSPADM

## 2020-10-26 RX ORDER — POTASSIUM CHLORIDE 1.5 G/1.58G
40 POWDER, FOR SOLUTION ORAL ONCE
Status: COMPLETED | OUTPATIENT
Start: 2020-10-26 | End: 2020-10-26

## 2020-10-26 RX ORDER — ENOXAPARIN SODIUM 100 MG/ML
30 INJECTION SUBCUTANEOUS EVERY 24 HOURS
Status: DISCONTINUED | OUTPATIENT
Start: 2020-10-27 | End: 2020-10-27

## 2020-10-26 RX ADMIN — SODIUM CHLORIDE 30 MG/ML INHALATION SOLUTION 4 ML: 30 SOLUTION INHALANT at 12:10

## 2020-10-26 RX ADMIN — VANCOMYCIN HYDROCHLORIDE 1000 MG: 1 INJECTION, POWDER, LYOPHILIZED, FOR SOLUTION INTRAVENOUS at 04:10

## 2020-10-26 RX ADMIN — SODIUM CHLORIDE 30 MG/ML INHALATION SOLUTION 4 ML: 30 SOLUTION INHALANT at 07:10

## 2020-10-26 RX ADMIN — GABAPENTIN 250 MG: 250 SUSPENSION ORAL at 08:10

## 2020-10-26 RX ADMIN — MICONAZOLE NITRATE: 2 OINTMENT TOPICAL at 08:10

## 2020-10-26 RX ADMIN — CIPROFLOXACIN 400 MG: 2 INJECTION, SOLUTION INTRAVENOUS at 06:10

## 2020-10-26 RX ADMIN — ACETAMINOPHEN 650 MG: 325 TABLET ORAL at 07:10

## 2020-10-26 RX ADMIN — FAMOTIDINE 20 MG: 10 INJECTION INTRAVENOUS at 08:10

## 2020-10-26 RX ADMIN — MORPHINE SULFATE 2 MG: 2 INJECTION, SOLUTION INTRAMUSCULAR; INTRAVENOUS at 11:10

## 2020-10-26 RX ADMIN — SODIUM CHLORIDE 30 MG/ML INHALATION SOLUTION 4 ML: 30 SOLUTION INHALANT at 11:10

## 2020-10-26 RX ADMIN — MUPIROCIN: 20 OINTMENT TOPICAL at 08:10

## 2020-10-26 RX ADMIN — PHENOBARBITAL 100 MG: 20 ELIXIR ORAL at 09:10

## 2020-10-26 RX ADMIN — FLUCONAZOLE 100 MG: 40 POWDER, FOR SUSPENSION ORAL at 08:10

## 2020-10-26 RX ADMIN — BACLOFEN 10 MG: 10 TABLET ORAL at 08:10

## 2020-10-26 RX ADMIN — CHLORHEXIDINE GLUCONATE 0.12% ORAL RINSE 15 ML: 1.2 LIQUID ORAL at 08:10

## 2020-10-26 RX ADMIN — CEFEPIME HYDROCHLORIDE 2 G: 2 INJECTION, SOLUTION INTRAVENOUS at 03:10

## 2020-10-26 RX ADMIN — IPRATROPIUM BROMIDE AND ALBUTEROL SULFATE 3 ML: .5; 3 SOLUTION RESPIRATORY (INHALATION) at 07:10

## 2020-10-26 RX ADMIN — PROPRANOLOL HYDROCHLORIDE 30 MG: 20 SOLUTION ORAL at 08:10

## 2020-10-26 RX ADMIN — BACLOFEN 10 MG: 10 TABLET ORAL at 03:10

## 2020-10-26 RX ADMIN — PROPRANOLOL HYDROCHLORIDE 30 MG: 20 SOLUTION ORAL at 09:10

## 2020-10-26 RX ADMIN — PROPRANOLOL HYDROCHLORIDE 30 MG: 20 SOLUTION ORAL at 03:10

## 2020-10-26 RX ADMIN — SODIUM CHLORIDE 30 MG/ML INHALATION SOLUTION 4 ML: 30 SOLUTION INHALANT at 03:10

## 2020-10-26 RX ADMIN — PIPERACILLIN AND TAZOBACTAM 4.5 G: 4; .5 INJECTION, POWDER, LYOPHILIZED, FOR SOLUTION INTRAVENOUS; PARENTERAL at 07:10

## 2020-10-26 RX ADMIN — POTASSIUM CHLORIDE 40 MEQ: 1.5 POWDER, FOR SOLUTION ORAL at 08:10

## 2020-10-26 NOTE — PROGRESS NOTES
ICU team called for PEG equipment. Patient has a Red and the attachment tube for feeds wasn't working due to stripping at the attachment site. Endo had them in stock so was was supplied to the ICU team. PEG otherwise functioning without issues.   Kyle Ferrell PA-C

## 2020-10-26 NOTE — PROGRESS NOTES
[facility] -   Critical Care Medicine  Progress Note    Patient Name: Glen Moscoso  MRN: [id number]  Admission Date: 10/22/2020  Hospital Length of Stay: 4 days  Code Status: Full Code  Attending Provider: Travis Bobo MD  Primary Care Provider: Yadi Lawson MD   Principal Problem: Acute hypoxemic respiratory failure    Subjective:     HPI:  25 year old male with spastic cerebral palsy, severe contractures, seizure disorder, malnutrition  Presented to ED from LTAC when found face down, unresponsive, with hypoxia  OF NOTE - recent hospitalizations x 2 for pneumonia (serratia), second hospitalization complicated with osteomyelitis from sacral decubitus (underwent surgical I&D) and pneumoperitoneum from suspected colonic perforation that was managed conservatively    On arrival today respiratory rate 50, , sat 89%, SBP 170s, and fever 102  Intubated on ED arrival with anesthesia assistance  Labs revealed leukocytosis, lactic acid 2.3,and procalcitonin 0.12  CT chest abd pelvis revealed - Right lower lobe probable pneumonia.  Osseous erosion could appear similarly but is less favored.  Additional scattered areas of consolidation in the right lung likely also related to infection.  There is a large decubitus ulcer right greater than left. The right ulcer tracks to the posterior aspect of the right inferior pubic ramus. Osteomyelitis cannot be excluded.     Hospital/ICU Course:  Arrived to ICU with OETT to mechanical ventilation, propofol sedation  10/23 - remains intubated on mechanical ventilation; oxygen demand decreased; large amount of thick secretions from trach and oral pharynx  10/24 - Intubated, improved aeration of lung  10/25 - self extubated yesterday; self removed midline overnight  10/26 - weaned to room air; continue drainage from wounds and frequent liquid stool contamination required flexiseal system and wound vac application    Review of Systems   Unable to perform ROS: Patient  nonverbal     Objective:     Vital Signs (Most Recent):  Temp: 98.2 °F (36.8 °C) (10/26/20 1101)  Pulse: 102 (10/26/20 1300)  Resp: (!) 25 (10/26/20 1300)  BP: 118/73 (10/26/20 1300)  SpO2: 100 % (10/26/20 1300) Vital Signs (24h Range):  Temp:  [98.2 °F (36.8 °C)-99.7 °F (37.6 °C)] 98.2 °F (36.8 °C)  Pulse:  [] 102  Resp:  [13-51] 25  SpO2:  [75 %-100 %] 100 %  BP: (118-144)/(63-97) 118/73     Weight: 34.3 kg (75 lb 9.9 oz)  Body mass index is 16.36 kg/m².      Intake/Output Summary (Last 24 hours) at 10/26/2020 1437  Last data filed at 10/26/2020 1301  Gross per 24 hour   Intake 3210 ml   Output 2860 ml   Net 350 ml         Physical Exam  Vitals signs and nursing note reviewed.   Constitutional:       Appearance: He is underweight. He is ill-appearing.   HENT:      Head: Atraumatic.      Mouth/Throat:      Mouth: Mucous membranes are moist.   Eyes:      Conjunctiva/sclera: Conjunctivae normal.      Pupils: Pupils are equal, round, and reactive to light.   Neck:      Musculoskeletal: No edema.      Vascular: No JVD.      Comments: Severe bilateral upper and lower extremity contractures  Cardiovascular:      Rate and Rhythm: Regular rhythm. Tachycardia present.      Pulses:           Radial pulses are 2+ on the right side and 2+ on the left side.        Dorsalis pedis pulses are 1+ on the right side and 1+ on the left side.   Pulmonary:      Breath sounds: Decreased air movement present. Decreased breath sounds present. No wheezing, rhonchi or rales.   Abdominal:      General: Abdomen is protuberant. Bowel sounds are normal. There is no distension.      Palpations: Abdomen is soft.      Tenderness: There is no abdominal tenderness.      Comments: Scarring around button PEG   Musculoskeletal:      Right lower leg: No edema.      Left lower leg: No edema.   Skin:     General: Skin is warm and dry.      Capillary Refill: Capillary refill takes 2 to 3 seconds.          Neurological:      GCS: GCS eye subscore is 3.  GCS verbal subscore is 1. GCS motor subscore is 1.           Lines/Drains/Airways     Peripherally Inserted Central Catheter Line            PICC Double Lumen 10/22/20 1300 4 days          Drain                 Gastrostomy/Enterostomy 08/04/20 1653 Percutaneous endoscopic gastrostomy (PEG) feeding 82 days         Gastrostomy/Enterostomy 10/22/20 1300 LUQ 4 days         Urethral Catheter 10/22/20 1653 Latex 16 Fr. 3 days         Rectal Tube 10/25/20 1400 rectal tube w/ balloon (indicate number of mLs) 1 day                Significant Labs:    CBC/Anemia Profile:  Recent Labs   Lab 10/25/20  0442 10/26/20  0334   WBC 10.77 10.14   HGB 9.5* 9.2*   HCT 30.2* 29.4*   * 512*   MCV 92 92   RDW 14.0 13.8        Chemistries:  Recent Labs   Lab 10/25/20  0442 10/26/20  0334    141   K 3.6 3.5    106   CO2 29 29   BUN 5* 3*   CREATININE 0.5 0.5   CALCIUM 8.0* 8.7   ALBUMIN 1.9* 1.9*   PROT 6.4 6.5   BILITOT 0.3 0.1   ALKPHOS 123 108   ALT 13 11   AST 13 15   MG 2.0 2.6   PHOS 2.4* 2.8       All pertinent labs within the past 24 hours have been reviewed.    Significant Imaging:  I have reviewed all pertinent imaging results/findings within the past 24 hours.      ABG  Recent Labs   Lab 10/24/20  0451   PH 7.398   PO2 140*   PCO2 40.4   HCO3 24.9   BE 0     Assessment/Plan:     Neuro  Seizure disorder  Continue home dose phenobarbital    Cerebral palsy  continue baclofen, neurontin    Derm  Decubitus ulcer of ischial area, left, stage IV  Appreciate [facility] eval, follow care recs  10/25 - increased drainage and frequent stool contamination per nursing staff - place fecal management system and plan discuss with [facility] tomorrow, could benefit from wound vac placement  10/26 - reviewed with [facility] RN, wound vac applied; recommendation from [facility] for consideration of diverting ostomy noted, will discuss with attending MD    Pulmonary  * Acute hypoxemic respiratory failure  Vent settings reviewed and adjusted to optimize gas  exchange  VAP prophylaxis  10/23 - decreased oxygen demand and lung mechanics today; strong suspicion for inability to protect airway + thick secretions, will add hypertonic saline nebs and maintain intubation today  10/24 improved continue therapy - self exbtubated  10/25 - supplemental oxygen to keep sat > 92; recommendation for trach placement for aspiration protection unchanged  10/26 - resolved    Right lower lobe pneumonia  Recent flouroquinolone resistant seratia pneumonia  Empiric vanc, zosyn  Culture endotracheal aspirate  Monitor temp, wbc, culture data  Recommend trach placement for airway protection from recurrent aspiration pneumonias; discussed with mother and answered all questions. Consulted ENT and spoke with Dr Palma who will plan for trach placement on Tuesday 10/27/20 at 8am  10/25 - self extubated and no indication for reintubation now but trach placement for airway protection remains recommendation   10/26 - plan for trach placement for airway protection from ongoing aspiration tomorrow; would complete 7-10 day antibiotic course given complicated recent lung history (currently day 5)    Cardiac/Vascular  Tachycardia  Suspect baseline sinus tachycardia exacerbated by fever, sepsis  Holding enteral propanolol given marginal BP  Optimize analgesia, sedation  10/23 - resume home dose propanolol    ID  Osteomyelitis of pelvis  Transferred to LTAC on 10/10 with plan for IV abx and recs to continue until 10/22  Currently on abx coverage for aspiration pneumonia   If concern for ongoing osteo, will likely need MRI  10/26 - no current evidence for ongoing osteomyelitis; monitor    Endocrine  Severe protein-calorie malnutrition  TF per RD recs      Critical Care Daily Checklist:    A: Awake: RASS Goal/Actual Goal: RASS Goal: 0-->alert and calm  Actual: [place] Agitation Sedation Scale (RASS): Restless   B: Spontaneous Breathing Trial Performed? Spon. Breathing Trial Initiated?: (S) Initiated (10/23/20  0800)   C: SAT & SBT Coordinated?  yes                      D: Delirium: CAM-ICU Overall CAM-ICU: Positive   E: Early Mobility Performed? Yes   F: Feeding Goal: Goals: Meet >75% EEN/EPN by RD f/u  Status: Nutrition Goal Status: new   Current Diet Order   Procedures    Diet NPO      AS: Analgesia/Sedation prn   T: Thromboembolic Prophylaxis lovenox resume post op   H: HOB > 300 Yes   U: Stress Ulcer Prophylaxis (if needed) pepcid   G: Glucose Control monitoring   B: Bowel Function Stool Occurrence: 1   I: Indwelling Catheter (Lines & Iglesias) Necessity reviewed   D: De-escalation of Antimicrobials/Pharmacotherapies reviewed    Plan for the day/ETD As above    Code Status:  Family/Goals of Care: Full Code  Anticipate LTAC on discharge   I have discussed case and plan of care in detail with Dr Block; Status and plan of care were discussed with team on multidisciplinary rounds.  Ok from pulm standpoint for transfer out of ICU, will sign off on transfer out.      JEFF Panchal-BC  Critical Care Medicine  [facility]

## 2020-10-26 NOTE — ASSESSMENT & PLAN NOTE
Recent flouroquinolone resistant seratia pneumonia  Empiric vanc, zosyn  Culture endotracheal aspirate  Monitor temp, wbc, culture data  Recommend trach placement for airway protection from recurrent aspiration pneumonias; discussed with mother and answered all questions. Consulted ENT and spoke with Dr Palma who will plan for trach placement on Tuesday 10/27/20 at 8am  10/25 - self extubated and no indication for reintubation now but trach placement for airway protection remains recommendation   10/26 - plan for trach placement for airway protection from ongoing aspiration tomorrow; would complete 7-10 day antibiotic course given complicated recent lung history (currently day 5)

## 2020-10-26 NOTE — PLAN OF CARE
Patient stable throughout shift, VSS, UOP adequate, FMS in place. Patient had some leakage around FMS; balloon deflated and re-inflated, positioning checked, working well at this time. Original tubing for g-button was not connecting to g-button this morning. New connector piece for tube feeding applied and tube feeds resumed. Wound care placed bilateral wound vacs to pressure sores on bilateral ischium/buttock area. Morphine PRN ordered for all wound vac dressing changes. Plan to take patient to OR in am for trach placement; tube feedings to be stopped at 0001. Mommeet, updated on Poc and verbalized understanding stating she will be here tomorrow morning for his procedure. All pressure points padded, patient turned q2 and repositioned. Patient nasal suctioned PRN.     Problem: Adult Inpatient Plan of Care  Goal: Plan of Care Review  Outcome: Ongoing, Not Progressing  Goal: Patient-Specific Goal (Individualization)  Outcome: Ongoing, Not Progressing  Goal: Absence of Hospital-Acquired Illness or Injury  Outcome: Ongoing, Progressing  Goal: Optimal Comfort and Wellbeing  Outcome: Ongoing, Progressing  Goal: Readiness for Transition of Care  Outcome: Ongoing, Progressing  Goal: Rounds/Family Conference  Outcome: Ongoing, Not Progressing

## 2020-10-26 NOTE — ASSESSMENT & PLAN NOTE
Appreciate WOC eval, follow care recs  10/25 - increased drainage and frequent stool contamination per nursing staff - place fecal management system and plan discuss with WO tomorrow, could benefit from wound vac placement  10/26 - reviewed with [facility] RN, wound vac applied; recommendation from [facility] for consideration of diverting ostomy noted, will discuss with attending MD

## 2020-10-26 NOTE — ASSESSMENT & PLAN NOTE
Due Aspiraton Pneumonia   Intubated on presentation   Self extubation 10/24  Presently on room air   Continue antibiotic 5 to 7 days   Trach placement planned due to frequent aspiration of own secretion

## 2020-10-26 NOTE — SUBJECTIVE & OBJECTIVE
Interval History:   Wound vac to Rt/Lt ischium placed . Trach planned for tomorrow . General Surgery consult to evaluate pt for diverting colostomy to prevent fecal contamination of wound.     Review of Systems   Unable to perform ROS: Patient nonverbal   Pt with CP , non verbal   Objective:     Vital Signs (Most Recent):  Temp: 98.2 °F (36.8 °C) (10/26/20 1101)  Pulse: (!) 111 (10/26/20 1507)  Resp: (!) 24 (10/26/20 1507)  BP: 107/68 (10/26/20 1400)  SpO2: 97 % (10/26/20 1507) Vital Signs (24h Range):  Temp:  [98.2 °F (36.8 °C)-99.7 °F (37.6 °C)] 98.2 °F (36.8 °C)  Pulse:  [] 111  Resp:  [13-47] 24  SpO2:  [96 %-100 %] 97 %  BP: (107-144)/(63-97) 107/68     Weight: 34.3 kg (75 lb 9.9 oz)  Body mass index is 16.36 kg/m².    Intake/Output Summary (Last 24 hours) at 10/26/2020 1514  Last data filed at 10/26/2020 1500  Gross per 24 hour   Intake 3155 ml   Output 2985 ml   Net 170 ml      Physical Exam  Constitutional:       General: He is not in acute distress.     Appearance: He is well-developed. He is not diaphoretic.      Comments: Awake , non verbal , does not appear he understands  conversation    HENT:      Head: Normocephalic and atraumatic.      Mouth/Throat:      Pharynx: No oropharyngeal exudate.   Eyes:      Conjunctiva/sclera: Conjunctivae normal.      Pupils: Pupils are equal, round, and reactive to light.   Neck:      Musculoskeletal: Neck supple.      Thyroid: No thyromegaly.      Vascular: No JVD.   Cardiovascular:      Rate and Rhythm: Regular rhythm. Tachycardia present.      Heart sounds: Normal heart sounds. No murmur.   Pulmonary:      Effort: Pulmonary effort is normal. No respiratory distress.      Breath sounds: No wheezing or rales.      Comments: Decreased breath sounds at bases  Chest:      Chest wall: No tenderness.   Abdominal:      General: Bowel sounds are normal. There is no distension.      Palpations: Abdomen is soft.      Tenderness: There is no abdominal tenderness. There is  no guarding or rebound.   Musculoskeletal:         General: Deformity present.      Comments: Upper and lower ext contracture    Lymphadenopathy:      Cervical: No cervical adenopathy.   Skin:     General: Skin is warm.      Findings: No rash.      Comments: Wound vac in place    Neurological:      Comments: Awake , non verbal, does not follow commands          Significant Labs:   CBC:   Recent Labs   Lab 10/25/20  0442 10/26/20  0334   WBC 10.77 10.14   HGB 9.5* 9.2*   HCT 30.2* 29.4*   * 512*     CMP:   Recent Labs   Lab 10/25/20  0442 10/26/20  0334    141   K 3.6 3.5    106   CO2 29 29   * 98   BUN 5* 3*   CREATININE 0.5 0.5   CALCIUM 8.0* 8.7   PROT 6.4 6.5   ALBUMIN 1.9* 1.9*   BILITOT 0.3 0.1   ALKPHOS 123 108   AST 13 15   ALT 13 11   ANIONGAP 6* 6*   EGFRNONAA >60 >60       Significant Imaging:

## 2020-10-26 NOTE — SUBJECTIVE & OBJECTIVE
Review of Systems   Unable to perform ROS: Patient nonverbal     Objective:     Vital Signs (Most Recent):  Temp: 98.2 °F (36.8 °C) (10/26/20 1101)  Pulse: 102 (10/26/20 1300)  Resp: (!) 25 (10/26/20 1300)  BP: 118/73 (10/26/20 1300)  SpO2: 100 % (10/26/20 1300) Vital Signs (24h Range):  Temp:  [98.2 °F (36.8 °C)-99.7 °F (37.6 °C)] 98.2 °F (36.8 °C)  Pulse:  [] 102  Resp:  [13-51] 25  SpO2:  [75 %-100 %] 100 %  BP: (118-144)/(63-97) 118/73     Weight: 34.3 kg (75 lb 9.9 oz)  Body mass index is 16.36 kg/m².      Intake/Output Summary (Last 24 hours) at 10/26/2020 1437  Last data filed at 10/26/2020 1301  Gross per 24 hour   Intake 3210 ml   Output 2860 ml   Net 350 ml         Physical Exam  Vitals signs and nursing note reviewed.   Constitutional:       Appearance: He is underweight. He is ill-appearing.   HENT:      Head: Atraumatic.      Mouth/Throat:      Mouth: Mucous membranes are moist.   Eyes:      Conjunctiva/sclera: Conjunctivae normal.      Pupils: Pupils are equal, round, and reactive to light.   Neck:      Musculoskeletal: No edema.      Vascular: No JVD.      Comments: Severe bilateral upper and lower extremity contractures  Cardiovascular:      Rate and Rhythm: Regular rhythm. Tachycardia present.      Pulses:           Radial pulses are 2+ on the right side and 2+ on the left side.        Dorsalis pedis pulses are 1+ on the right side and 1+ on the left side.   Pulmonary:      Breath sounds: Decreased air movement present. Decreased breath sounds present. No wheezing, rhonchi or rales.   Abdominal:      General: Abdomen is protuberant. Bowel sounds are normal. There is no distension.      Palpations: Abdomen is soft.      Tenderness: There is no abdominal tenderness.      Comments: Scarring around button PEG   Musculoskeletal:      Right lower leg: No edema.      Left lower leg: No edema.   Skin:     General: Skin is warm and dry.      Capillary Refill: Capillary refill takes 2 to 3 seconds.           Neurological:      GCS: GCS eye subscore is 3. GCS verbal subscore is 1. GCS motor subscore is 1.           Lines/Drains/Airways     Peripherally Inserted Central Catheter Line            PICC Double Lumen 10/22/20 1300 4 days          Drain                 Gastrostomy/Enterostomy 08/04/20 1653 Percutaneous endoscopic gastrostomy (PEG) feeding 82 days         Gastrostomy/Enterostomy 10/22/20 1300 LUQ 4 days         Urethral Catheter 10/22/20 1653 Latex 16 Fr. 3 days         Rectal Tube 10/25/20 1400 rectal tube w/ balloon (indicate number of mLs) 1 day                Significant Labs:    CBC/Anemia Profile:  Recent Labs   Lab 10/25/20  0442 10/26/20  0334   WBC 10.77 10.14   HGB 9.5* 9.2*   HCT 30.2* 29.4*   * 512*   MCV 92 92   RDW 14.0 13.8        Chemistries:  Recent Labs   Lab 10/25/20  0442 10/26/20  0334    141   K 3.6 3.5    106   CO2 29 29   BUN 5* 3*   CREATININE 0.5 0.5   CALCIUM 8.0* 8.7   ALBUMIN 1.9* 1.9*   PROT 6.4 6.5   BILITOT 0.3 0.1   ALKPHOS 123 108   ALT 13 11   AST 13 15   MG 2.0 2.6   PHOS 2.4* 2.8       All pertinent labs within the past 24 hours have been reviewed.    Significant Imaging:  I have reviewed all pertinent imaging results/findings within the past 24 hours.

## 2020-10-26 NOTE — PROGRESS NOTES
[facility] Hospital Medicine  Progress Note    Patient Name: Glen Moscoso  MRN: [id number]  Patient Class: IP- Inpatient   Admission Date: 10/22/2020  Length of Stay: 4 days  Attending Physician: Travis Bobo MD  Primary Care Provider: Yadi Lawson MD        Subjective:     Principal Problem:Acute hypoxemic respiratory failure        HPI:  Glen Moscoso is a 25 y.o. male patient with a PMHx of cerebral palsy who presents to the Emergency Department for evaluation of respiratory distress which onset PTA. EMS reports pt was found face down on the pillow in nursing home. Pt arrives tachycardic and tachypneic. Patient recently discharge from [facility] where he was treated for respiratory failure and sepsis. [facility] Vancomycin, Cefepime and Flagyl to treat pelvic osteomyelitis and resolving colitis. In the ED, WBCs 18.7K, Platelet 681, CO2 20, Lactate 2.3. UA negative. CXR revealed mild atelectasis or infiltrate right perihilar region and right midlung zone. Pt with worsening resp failure Subsequently pt was intubated. Sepsis protocol initiated in the ED including IV hydration. Hospital medicine called for admission. Patient placed in ICU. Patient is a full code, SDM mother, Leni Moscoso at [phone].     Overview/Hospital Course:  10/23  Remains intubated , enteral nutrition initiated. Continued treatment of sepsis due to aspiration pneumonia .due to poor air way protection .  Cultures are with negative growth to date. Temp Max 103 and wbc 19.30   Tachycardia was reported over night , follow up Echo EF 50 % normal diastolic function.  Case discussed with Dr Palma who has agreed for trach placement due to recurrent aspiration pneumonia.    10/24    Remains intubated, Tmax of 101.5 .CT Abd/pelvis - right inferior pubic ramus -cannot exclude     Osteomyelitis.      10/25   Self extubated, presently on Room air    Case discussed with Dr Palma who  has agreed for trach placement due to recurrent aspiration pneumonia.  10/26- Afebrile. Remains on RA with satisfactory SpO2. Trach placement planned for tomorrow . Wound vac placed to Rt./Lt ischium stage IV pressure ulcers with full thickness tissue loss with exposed bone. General Surgery consulted  to evaluate pt for diverting colostomy to prevent fecal contamination of wound . Rectal tube in place. Labs- WBC normalize 10.1, Hbg 9.2, Pl 512, creatinine 0.5     Interval History:   Wound vac to Rt/Lt ischium placed . Trach planned for tomorrow . General Surgery consult to evaluate pt for diverting colostomy to prevent fecal contamination of wound.     Review of Systems   Unable to perform ROS: Patient nonverbal   Pt with CP , non verbal   Objective:     Vital Signs (Most Recent):  Temp: 98.2 °F (36.8 °C) (10/26/20 1101)  Pulse: (!) 111 (10/26/20 1507)  Resp: (!) 24 (10/26/20 1507)  BP: 107/68 (10/26/20 1400)  SpO2: 97 % (10/26/20 1507) Vital Signs (24h Range):  Temp:  [98.2 °F (36.8 °C)-99.7 °F (37.6 °C)] 98.2 °F (36.8 °C)  Pulse:  [] 111  Resp:  [13-47] 24  SpO2:  [96 %-100 %] 97 %  BP: (107-144)/(63-97) 107/68     Weight: 34.3 kg (75 lb 9.9 oz)  Body mass index is 16.36 kg/m².    Intake/Output Summary (Last 24 hours) at 10/26/2020 1514  Last data filed at 10/26/2020 1500  Gross per 24 hour   Intake 3155 ml   Output 2985 ml   Net 170 ml      Physical Exam  Constitutional:       General: He is not in acute distress.     Appearance: He is well-developed. He is not diaphoretic.      Comments: Awake , non verbal , does not appear he understands  conversation    HENT:      Head: Normocephalic and atraumatic.      Mouth/Throat:      Pharynx: No oropharyngeal exudate.   Eyes:      Conjunctiva/sclera: Conjunctivae normal.      Pupils: Pupils are equal, round, and reactive to light.   Neck:      Musculoskeletal: Neck supple.      Thyroid: No thyromegaly.      Vascular: No JVD.   Cardiovascular:      Rate and Rhythm:  Regular rhythm. Tachycardia present.      Heart sounds: Normal heart sounds. No murmur.   Pulmonary:      Effort: Pulmonary effort is normal. No respiratory distress.      Breath sounds: No wheezing or rales.      Comments: Decreased breath sounds at bases  Chest:      Chest wall: No tenderness.   Abdominal:      General: Bowel sounds are normal. There is no distension.      Palpations: Abdomen is soft.      Tenderness: There is no abdominal tenderness. There is no guarding or rebound.   Musculoskeletal:         General: Deformity present.      Comments: Upper and lower ext contracture    Lymphadenopathy:      Cervical: No cervical adenopathy.   Skin:     General: Skin is warm.      Findings: No rash.      Comments: Wound vac in place    Neurological:      Comments: Awake , non verbal, does not follow commands          Significant Labs:   CBC:   Recent Labs   Lab 10/25/20  0442 10/26/20  0334   WBC 10.77 10.14   HGB 9.5* 9.2*   HCT 30.2* 29.4*   * 512*     CMP:   Recent Labs   Lab 10/25/20  0442 10/26/20  0334    141   K 3.6 3.5    106   CO2 29 29   * 98   BUN 5* 3*   CREATININE 0.5 0.5   CALCIUM 8.0* 8.7   PROT 6.4 6.5   ALBUMIN 1.9* 1.9*   BILITOT 0.3 0.1   ALKPHOS 123 108   AST 13 15   ALT 13 11   ANIONGAP 6* 6*   EGFRNONAA >60 >60       Significant Imaging:       Assessment/Plan:      * Acute hypoxemic respiratory failure  Due Aspiraton Pneumonia   Intubated on presentation   Self extubation 10/24  Presently on room air   Continue antibiotic 5 to 7 days   Trach placement planned due to frequent aspiration of own secretion     Right lower lobe pneumonia  Recurrent Aspiration Pneumonia  Trach placement -pending for 10/28/20 with Dr Palma      Tachycardia  Due to infection  ABX  Symptomatic care  Monitor        Osteomyelitis of pelvis  ABX  Wound Care  ID on Consult      Severe protein-calorie malnutrition  Enteral tube feeds   Continue supplements        Decubitus ulcer of ischial area,  left, stage IV  Wound Care  Antibiotics per ID  PICC in place  Possible osteomyelitis   Wound vac placed   General Surgery consult to evaluate for diverting colostomy to prevent fecal contamination of wound.       Seizure disorder  Phenobarbital  Neuro checks    Cerebral palsy  Supportive care       VTE Risk Mitigation (From admission, onward)         Ordered     enoxaparin injection 30 mg  Every 24 hours      10/26/20 1447     IP VTE HIGH RISK PATIENT  Once      10/22/20 1825     Place sequential compression device  Until discontinued      10/22/20 1754                Discharge Planning   ROCÍO:      Code Status: Full Code   Is the patient medically ready for discharge?:     Reason for patient still in hospital (select all that apply): Patient trending condition and Laboratory test  Discharge Plan A: Long-term acute care facility (LTAC)            Critical care time spent on the evaluation and treatment of severe organ dysfunction, review of pertinent labs and imaging studies, discussions with consulting providers and discussions with patient/family: 30  minutes.      Travis Bobo MD  Department of Hospital Medicine   [facility]

## 2020-10-26 NOTE — PROGRESS NOTES
10/26/20 1100   Handoff Report   Given To Joanna Bacon        Gastrostomy/Enterostomy 10/22/20 1300 LUQ   Placement Date/Time: 10/22/20 1300   Present Prior to Hospital Arrival?: Yes  Location: LUQ   Tube Feeding Intake (mL) 45        Urethral Catheter 10/22/20 1653 Latex 16 Fr.   Placement Date/Time: 10/22/20 1653   Present Prior to Hospital Arrival?: No  Hand Hygiene: Performed  Inserted by: RN  Insertion attempts (enter comment if more than 2 attempts): 1  Catheter Type: Latex  Tube Size (Fr.): 16 Fr.  Catheter Balloon Infla...   Output (mL) 125 mL   Skin   Skin WDL ex   Skin Color/Characteristics redness blanchable   Skin Temperature warm   Skin Moisture dry   Skin Integrity rash;wound   Specialty Bed/Overlay Low air loss;Air fluidized   Bed Support Surface Assessed   Jerry Risk Assessment   Sensory Perception 4-->no impairment   Moisture 2-->very moist   Activity 1-->bedfast   Mobility 2-->very limited   Nutrition 3-->adequate   Friction and Shear 1-->problem   Jerry Score 13        Negative Pressure Wound Therapy  10/26/20    Placement Date: 10/26/20   Side: (c)   Location: Ischial tuberosity   NPWT Type Vacuum Therapy   Therapy Setting NPWT Continuous therapy   Pressure Setting NPWT 125 mmHg   Therapy Interventions NPWT Y connector   Sponges Inserted NPWT Black;2  ((1 to right, 1 to left))   General Output (mL) 0        Altered Skin Integrity 10/23/20 Right Ischial tuberosity Full thickness tissue loss with exposed bone, tendon, or muscle. Often includes undermining and tunneling. May extend into muscle and/or supporting structures.   Date First Assessed: 10/23/20   Altered Skin Integrity Present on Admission: yes  Side: Right  Location: Ischial tuberosity  Description of Altered Skin Integrity: Full thickness tissue loss with exposed bone, tendon, or muscle. Often includes undermi...   Description of Altered Skin Integrity Full thickness tissue loss with exposed bone, tendon, or muscle. Often includes  undermining and tunneling. May extend into muscle and/or supporting structures.   Dressing Appearance Intact;Moist drainage   Drainage Amount Moderate   Drainage Characteristics/Odor Serosanguineous   Appearance Red;Muscle;Bone;Moist;Not granulating   Tissue loss description Full thickness   Periwound Area Intact   Wound Edges Open   Wound Length (cm) 5 cm   Wound Width (cm) 7 cm   Wound Depth (cm) 3 cm   Wound Volume (cm^3) 105 cm^3   Wound Surface Area (cm^2) 35 cm^2   Undermining (depth (cm)/location) 4cm from 9-1 o'clock   Care Cleansed with:;Wound cleanser;Applied:;Skin Barrier   Dressing Applied  (wound vac)   Dressing Change Due 10/28/20        Altered Skin Integrity 10/23/20 Left Ischial tuberosity Full thickness tissue loss with exposed bone, tendon, or muscle. Often includes undermining and tunneling. May extend into muscle and/or supporting structures.   Date First Assessed: 10/23/20   Altered Skin Integrity Present on Admission: yes  Side: Left  Location: Ischial tuberosity  Description of Altered Skin Integrity: Full thickness tissue loss with exposed bone, tendon, or muscle. Often includes undermin...   Description of Altered Skin Integrity Full thickness tissue loss with exposed bone, tendon, or muscle. Often includes undermining and tunneling. May extend into muscle and/or supporting structures.   Dressing Appearance Intact   Drainage Amount Moderate   Drainage Characteristics/Odor Serosanguineous   Appearance Red;Muscle;Bone;Moist;Not granulating   Tissue loss description Full thickness   Periwound Area Intact   Wound Edges Open   Wound Length (cm) 1.5 cm   Wound Width (cm) 5 cm   Wound Depth (cm) 3 cm   Wound Volume (cm^3) 22.5 cm^3   Wound Surface Area (cm^2) 7.5 cm^2   Undermining (depth (cm)/location) 4cm from 9-3 o'clock   Care Cleansed with:;Wound cleanser;Applied:;Skin Barrier   Dressing Applied  (wound vac)   Dressing Change Due 10/28/20     F/U visit with Mr. Moscoso for continued wound  "management. Case discussed with YASMINE Sidhu NP and decision to apply wound vac to bilateral ischium Stage 4 pressure injuries was made due to increased drainage and fecal incontinence, now with flexiseal. Patient is awake and alert. MASD with yeast with mild improvement from last assessment, Aloe [place] AF moisture barrier in use per MAR. Improvement in intertrigo is noted to bilateral popliteal.   Turned with max assistance and positioned semi-prone on left side. Dressings removed from bilateral ischium. Wound measurements unchanged from last assessment:  Right Ischium [id number] with 4cm undermining noted from 9-1 o'clock.  Left Ischium [id number] with 4cm undermining noted from 9-3 o'clock.  Both with moist red tissue, muscle, bone noted. Moderate amount serosanguinous drainage. Nayely wound skin intact. Cleansed with saline and patted dry. Nayely wound skin painted with cavilon. Ostomy rings applied around each wound. One piece black foam cut to size and applied to fill right ischium wound including undermined space, and secured with drape. One piece black foam cut to size and applied to fill left ischium wound including undermined space, and secured with drape. sensatrac pad and tubing applied to each, connected with Y-Connector, and attached to I wound VAC at continuous -125 mmHg low intensity suction. Unable to attain suction, and VAC machine was noted to be non-functioning "cannister not engaged". [employer] notified and new machine delivered within an hour. New machine then attached at -125 mmHg low intensity suction with good seal noted, no leak. Patient tolerated wound care well, was pre-medicated for pain by primary nurse. Recommend wound vac dressing changes every MWF.  Recommend general surgery consult to consider diverting colostomy for wound management. Will follow.   "

## 2020-10-26 NOTE — ASSESSMENT & PLAN NOTE
Wound Care  Antibiotics per ID  PICC in place  Possible osteomyelitis   Wound vac placed   General Surgery consult to evaluate for diverting colostomy to prevent fecal contamination of wound.

## 2020-10-26 NOTE — ASSESSMENT & PLAN NOTE
Vent settings reviewed and adjusted to optimize gas exchange  VAP prophylaxis  10/23 - decreased oxygen demand and lung mechanics today; strong suspicion for inability to protect airway + thick secretions, will add hypertonic saline nebs and maintain intubation today  10/24 improved continue therapy - self exbtubated  10/25 - supplemental oxygen to keep sat > 92; recommendation for trach placement for aspiration protection unchanged  10/26 - resolved

## 2020-10-26 NOTE — PLAN OF CARE
Pt awake, unable to follow commands d/t cognitive deficits, pt is non verbal. On RA, sats 95-98 %. -110. BP stable. Tolerating TF per G button @ 45 mls/hr. Rectal tube in place, minimal output from rectal tube, liquid stool leaks around tube frequently. Voiding per rizvi catheter,  mls/hr. Pt turned q 2 hrs. Will report to oncoming RN.

## 2020-10-26 NOTE — ANESTHESIA PREPROCEDURE EVALUATION
10/26/2020  Glen Moscoso is a 25 y.o., male.  Patient Active Problem List   Diagnosis    Cerebral palsy    Sinus tachycardia    Proctocolitis    Seizure disorder    Bacterial infection due to Serratia    Anemia of chronic disease    Decubitus ulcer of ischial area, left, stage IV    Pressure injury of right ischium, stage 4    Pneumoperitoneum    Severe protein-calorie malnutrition    Colitis    Osteomyelitis of pelvis    Acute hypoxemic respiratory failure    Tachycardia    Right lower lobe pneumonia     No current facility-administered medications on file prior to encounter.      Current Outpatient Medications on File Prior to Encounter   Medication Sig Dispense Refill    arginine-glutamine-calcium HMB (CHEO) 7-7-1.5 gram PwPk Take by mouth 2 (two) times a day.      ascorbic acid, vitamin C, (VITAMIN C) 500 MG tablet 1 tablet (500 mg total) by Per G Tube route once daily.      baclofen (LIORESAL) 10 MG tablet 1 tablet (10 mg total) by Per G Tube route 3 (three) times daily. 90 tablet 11    lactobacillus acidophilus & bulgar (LACTINEX) 100 million cell packet Take 1 tablet by mouth once daily.      multivitamin liquid no.118 Liqd 10 mLs by Per G Tube route once daily.      propranoloL (INDERAL) 20 MG tablet 1 tablet (20 mg total) by Per G Tube route 3 (three) times daily. (Patient taking differently: 20 mg by Per G Tube route 4 (four) times daily. ) 90 tablet 11    acetaminophen (TYLENOL) 325 MG tablet Take 325 mg by mouth every 4 (four) hours as needed for Pain.      bisacodyL (DULCOLAX) 10 mg Supp Place 1 suppository (10 mg total) rectally daily as needed (Until bowel movement if patient has no bowel movement for 2 days).  0    diphenhydrAMINE (BENADRYL) 12.5 mg/5 mL elixir 10 mLs (25 mg total) by Per G Tube route 4 (four) times daily as needed for Allergies.  0    gabapentin  (NEURONTIN) 250 mg/5 mL solution 5 mLs (250 mg total) by Per G Tube route nightly. At bedtime      melatonin (MELATIN) 3 mg tablet 2 tablets (6 mg total) by Per G Tube route nightly as needed for Insomnia.  0    PHENobarbitaL 20 mg/5 mL (4 mg/mL) Elix elixir 25 mLs (100 mg total) by Per G Tube route every evening. 473 mL 0     Past Surgical History:   Procedure Laterality Date    CHOLECYSTECTOMY      FLEXIBLE SIGMOIDOSCOPY N/A 9/9/2020    Procedure: SIGMOIDOSCOPY, FLEXIBLE;  Surgeon: America Goode MD;  Location: [facility] ([address] [place] [address]);  Service: Endoscopy;  Laterality: N/A;    HIP SURGERY         Anesthesia Evaluation    I have reviewed the Patient Summary Reports.    I have reviewed the Nursing Notes.    I have reviewed the Medications.     Review of Systems  Anesthesia Hx:  No problems with previous Anesthesia  History of prior surgery of interest to airway management or planning: Previous anesthesia: General  Denies Personal Hx of Anesthesia complications.   Social:  Non-Smoker    Hematology/Oncology:     Oncology Normal    -- Anemia:   EENT/Dental:EENT/Dental Normal   Cardiovascular:  Cardiovascular Normal  ECG has been reviewed. Echo 10/2020  · The left ventricle is normal in size with low normal systolic function. The estimated ejection fraction is 50%.  · Normal left ventricular diastolic function.  · Normal right ventricular systolic function.  · Mild tricuspid regurgitation.  · The estimated PA systolic pressure is 26 mmHg.  · Normal central venous pressure (3 mmHg).  · Tachcardia   Pulmonary:  Pulmonary Normal    Renal/:  Renal/ Normal     Hepatic/GI:   GERD    Musculoskeletal:  Musculoskeletal Normal    Neurological:   Seizures  Movement Disorder Dx, Cerebral Palsy   Endocrine:  Endocrine Normal    Dermatological:  Skin Normal    Psych:  Psychiatric Normal           Chemistry        Component Value Date/Time     10/26/2020 0334    K 3.5 10/26/2020 0334     10/26/2020 0334    CO2 29  10/26/2020 0334    BUN 3 (L) 10/26/2020 0334    CREATININE 0.5 10/26/2020 0334    GLU 98 10/26/2020 0334        Component Value Date/Time    CALCIUM 8.7 10/26/2020 0334    ALKPHOS 108 10/26/2020 0334    AST 15 10/26/2020 0334    ALT 11 10/26/2020 0334    BILITOT 0.1 10/26/2020 0334    ESTGFRAFRICA >60 10/26/2020 0334    EGFRNONAA >60 10/26/2020 0334        Lab Results   Component Value Date    WBC 10.14 10/26/2020    HGB 9.2 (L) 10/26/2020    HCT 29.4 (L) 10/26/2020    MCV 92 10/26/2020     (H) 10/26/2020           Physical Exam  General:  Cachexia    Airway/Jaw/Neck:  Airway Findings: Mallampati: III      Chest/Lungs:  Chest/Lungs Clear    Heart/Vascular:  Heart Findings: Normal Heart murmur: negative       Mental Status:  Mental Status Findings: (Nonverbal)         Anesthesia Plan  Type of Anesthesia, risks & benefits discussed:  Anesthesia Type:  general  Patient's Preference:   Intra-op Monitoring Plan: standard ASA monitors  Intra-op Monitoring Plan Comments:   Post Op Pain Control Plan: multimodal analgesia  Post Op Pain Control Plan Comments:   Induction:   IV  Beta Blocker:  Patient is on a Beta-Blocker and has received one dose within the past 24 hours (No further documentation required).       Informed Consent: Patient understands risks and agrees with Anesthesia plan.  Questions answered. Anesthesia consent signed with patient.  ASA Score: 3     Day of Surgery Review of History & Physical: I have interviewed and examined the patient. I have reviewed the patient's H&P dated:    H&P update referred to the surgeon.         Ready For Surgery From Anesthesia Perspective.

## 2020-10-26 NOTE — PLAN OF CARE
Updated Leni, pt's mother, that there will be no ability for her to stay in the hospital tonight.  Leni verbalized understanding.  [facility]  [phone] is supplier of all tube feed supplies. [facility] for [facility] [place], [place] [phone].

## 2020-10-26 NOTE — ASSESSMENT & PLAN NOTE
Transferred to LTAC on 10/10 with plan for IV abx and recs to continue until 10/22  Currently on abx coverage for aspiration pneumonia   If concern for ongoing osteo, will likely need MRI  10/26 - no current evidence for ongoing osteomyelitis; monitor

## 2020-10-27 ENCOUNTER — ANESTHESIA (OUTPATIENT)
Dept: SURGERY | Facility: HOSPITAL | Age: 25
DRG: 003 | End: 2020-10-27
Payer: MEDICAID

## 2020-10-27 PROBLEM — J96.01 ACUTE HYPOXEMIC RESPIRATORY FAILURE: Status: RESOLVED | Noted: 2020-10-03 | Resolved: 2020-10-27

## 2020-10-27 PROBLEM — Z99.11 ON MECHANICALLY ASSISTED VENTILATION: Status: ACTIVE | Noted: 2020-10-27

## 2020-10-27 PROBLEM — Z93.0 STATUS POST TRACHEOSTOMY: Status: ACTIVE | Noted: 2020-10-27

## 2020-10-27 LAB
ALBUMIN SERPL BCP-MCNC: 2.1 G/DL (ref 3.5–5.2)
ALP SERPL-CCNC: 108 U/L (ref 55–135)
ALT SERPL W/O P-5'-P-CCNC: 15 U/L (ref 10–44)
ANION GAP SERPL CALC-SCNC: 8 MMOL/L (ref 8–16)
AST SERPL-CCNC: 18 U/L (ref 10–40)
BACTERIA BLD CULT: NORMAL
BACTERIA BLD CULT: NORMAL
BASOPHILS # BLD AUTO: 0.03 K/UL (ref 0–0.2)
BASOPHILS NFR BLD: 0.3 % (ref 0–1.9)
BILIRUB SERPL-MCNC: 0.1 MG/DL (ref 0.1–1)
BUN SERPL-MCNC: 7 MG/DL (ref 6–20)
CALCIUM SERPL-MCNC: 9 MG/DL (ref 8.7–10.5)
CHLORIDE SERPL-SCNC: 104 MMOL/L (ref 95–110)
CO2 SERPL-SCNC: 27 MMOL/L (ref 23–29)
CREAT SERPL-MCNC: 0.5 MG/DL (ref 0.5–1.4)
DIFFERENTIAL METHOD: ABNORMAL
EOSINOPHIL # BLD AUTO: 0 K/UL (ref 0–0.5)
EOSINOPHIL NFR BLD: 0.1 % (ref 0–8)
ERYTHROCYTE [DISTWIDTH] IN BLOOD BY AUTOMATED COUNT: 13.8 % (ref 11.5–14.5)
EST. GFR  (AFRICAN AMERICAN): >60 ML/MIN/1.73 M^2
EST. GFR  (NON AFRICAN AMERICAN): >60 ML/MIN/1.73 M^2
GLUCOSE SERPL-MCNC: 98 MG/DL (ref 70–110)
HCT VFR BLD AUTO: 31.4 % (ref 40–54)
HGB BLD-MCNC: 9.6 G/DL (ref 14–18)
IMM GRANULOCYTES # BLD AUTO: 0.09 K/UL (ref 0–0.04)
IMM GRANULOCYTES NFR BLD AUTO: 0.8 % (ref 0–0.5)
LYMPHOCYTES # BLD AUTO: 1.4 K/UL (ref 1–4.8)
LYMPHOCYTES NFR BLD: 13.3 % (ref 18–48)
MAGNESIUM SERPL-MCNC: 2.1 MG/DL (ref 1.6–2.6)
MCH RBC QN AUTO: 28.5 PG (ref 27–31)
MCHC RBC AUTO-ENTMCNC: 30.6 G/DL (ref 32–36)
MCV RBC AUTO: 93 FL (ref 82–98)
MONOCYTES # BLD AUTO: 1.1 K/UL (ref 0.3–1)
MONOCYTES NFR BLD: 9.8 % (ref 4–15)
NEUTROPHILS # BLD AUTO: 8.1 K/UL (ref 1.8–7.7)
NEUTROPHILS NFR BLD: 75.7 % (ref 38–73)
NRBC BLD-RTO: 0 /100 WBC
PHOSPHATE SERPL-MCNC: 4 MG/DL (ref 2.7–4.5)
PLATELET # BLD AUTO: 549 K/UL (ref 150–350)
PMV BLD AUTO: 8.7 FL (ref 9.2–12.9)
POTASSIUM SERPL-SCNC: 4 MMOL/L (ref 3.5–5.1)
PROT SERPL-MCNC: 7 G/DL (ref 6–8.4)
RBC # BLD AUTO: 3.37 M/UL (ref 4.6–6.2)
SODIUM SERPL-SCNC: 139 MMOL/L (ref 136–145)
WBC # BLD AUTO: 10.67 K/UL (ref 3.9–12.7)

## 2020-10-27 PROCEDURE — 25000003 PHARM REV CODE 250: Performed by: NURSE ANESTHETIST, CERTIFIED REGISTERED

## 2020-10-27 PROCEDURE — 25000242 PHARM REV CODE 250 ALT 637 W/ HCPCS: Performed by: NURSE PRACTITIONER

## 2020-10-27 PROCEDURE — 31600 PLANNED TRACHEOSTOMY: CPT | Mod: ,,, | Performed by: OTOLARYNGOLOGY

## 2020-10-27 PROCEDURE — 94640 AIRWAY INHALATION TREATMENT: CPT

## 2020-10-27 PROCEDURE — 25000003 PHARM REV CODE 250: Performed by: INTERNAL MEDICINE

## 2020-10-27 PROCEDURE — 83735 ASSAY OF MAGNESIUM: CPT

## 2020-10-27 PROCEDURE — 63600175 PHARM REV CODE 636 W HCPCS: Performed by: NURSE PRACTITIONER

## 2020-10-27 PROCEDURE — 37000008 HC ANESTHESIA 1ST 15 MINUTES: Performed by: OTOLARYNGOLOGY

## 2020-10-27 PROCEDURE — 94002 VENT MGMT INPAT INIT DAY: CPT

## 2020-10-27 PROCEDURE — 85025 COMPLETE CBC W/AUTO DIFF WBC: CPT

## 2020-10-27 PROCEDURE — 99900026 HC AIRWAY MAINTENANCE (STAT)

## 2020-10-27 PROCEDURE — 97803 MED NUTRITION INDIV SUBSEQ: CPT

## 2020-10-27 PROCEDURE — 94003 VENT MGMT INPAT SUBQ DAY: CPT

## 2020-10-27 PROCEDURE — 25000003 PHARM REV CODE 250: Performed by: NURSE PRACTITIONER

## 2020-10-27 PROCEDURE — 99291 PR CRITICAL CARE, E/M 30-74 MINUTES: ICD-10-PCS | Mod: ,,, | Performed by: NURSE PRACTITIONER

## 2020-10-27 PROCEDURE — 20000000 HC ICU ROOM

## 2020-10-27 PROCEDURE — 63600175 PHARM REV CODE 636 W HCPCS: Performed by: NURSE ANESTHETIST, CERTIFIED REGISTERED

## 2020-10-27 PROCEDURE — 80053 COMPREHEN METABOLIC PANEL: CPT

## 2020-10-27 PROCEDURE — 27201423 OPTIME MED/SURG SUP & DEVICES STERILE SUPPLY: Performed by: OTOLARYNGOLOGY

## 2020-10-27 PROCEDURE — 31600 PR TRACHEOSTOMY, PLANNED: ICD-10-PCS | Mod: ,,, | Performed by: OTOLARYNGOLOGY

## 2020-10-27 PROCEDURE — 63600175 PHARM REV CODE 636 W HCPCS: Performed by: INTERNAL MEDICINE

## 2020-10-27 PROCEDURE — 84100 ASSAY OF PHOSPHORUS: CPT

## 2020-10-27 PROCEDURE — 37000009 HC ANESTHESIA EA ADD 15 MINS: Performed by: OTOLARYNGOLOGY

## 2020-10-27 PROCEDURE — 99291 CRITICAL CARE FIRST HOUR: CPT | Mod: ,,, | Performed by: NURSE PRACTITIONER

## 2020-10-27 PROCEDURE — 36000706: Performed by: OTOLARYNGOLOGY

## 2020-10-27 PROCEDURE — 36000707: Performed by: OTOLARYNGOLOGY

## 2020-10-27 PROCEDURE — 99900035 HC TECH TIME PER 15 MIN (STAT)

## 2020-10-27 RX ORDER — SODIUM CHLORIDE, SODIUM LACTATE, POTASSIUM CHLORIDE, CALCIUM CHLORIDE 600; 310; 30; 20 MG/100ML; MG/100ML; MG/100ML; MG/100ML
INJECTION, SOLUTION INTRAVENOUS CONTINUOUS PRN
Status: DISCONTINUED | OUTPATIENT
Start: 2020-10-27 | End: 2020-10-27

## 2020-10-27 RX ORDER — DEXAMETHASONE SODIUM PHOSPHATE 4 MG/ML
INJECTION, SOLUTION INTRA-ARTICULAR; INTRALESIONAL; INTRAMUSCULAR; INTRAVENOUS; SOFT TISSUE
Status: DISCONTINUED | OUTPATIENT
Start: 2020-10-27 | End: 2020-10-27

## 2020-10-27 RX ORDER — ENOXAPARIN SODIUM 100 MG/ML
30 INJECTION SUBCUTANEOUS EVERY 24 HOURS
Status: DISCONTINUED | OUTPATIENT
Start: 2020-10-27 | End: 2020-11-03 | Stop reason: HOSPADM

## 2020-10-27 RX ORDER — FENTANYL CITRATE 50 UG/ML
INJECTION, SOLUTION INTRAMUSCULAR; INTRAVENOUS
Status: DISCONTINUED | OUTPATIENT
Start: 2020-10-27 | End: 2020-10-27

## 2020-10-27 RX ORDER — ENOXAPARIN SODIUM 100 MG/ML
40 INJECTION SUBCUTANEOUS EVERY 24 HOURS
Status: DISCONTINUED | OUTPATIENT
Start: 2020-10-27 | End: 2020-10-27

## 2020-10-27 RX ORDER — PROPOFOL 10 MG/ML
VIAL (ML) INTRAVENOUS
Status: DISCONTINUED | OUTPATIENT
Start: 2020-10-27 | End: 2020-10-27

## 2020-10-27 RX ORDER — ONDANSETRON 2 MG/ML
INJECTION INTRAMUSCULAR; INTRAVENOUS
Status: DISCONTINUED | OUTPATIENT
Start: 2020-10-27 | End: 2020-10-27

## 2020-10-27 RX ORDER — ROCURONIUM BROMIDE 10 MG/ML
INJECTION, SOLUTION INTRAVENOUS
Status: DISCONTINUED | OUTPATIENT
Start: 2020-10-27 | End: 2020-10-27

## 2020-10-27 RX ORDER — CHLORHEXIDINE GLUCONATE ORAL RINSE 1.2 MG/ML
15 SOLUTION DENTAL 2 TIMES DAILY
Status: DISCONTINUED | OUTPATIENT
Start: 2020-10-27 | End: 2020-11-03 | Stop reason: HOSPADM

## 2020-10-27 RX ORDER — SUCCINYLCHOLINE CHLORIDE 20 MG/ML
INJECTION INTRAMUSCULAR; INTRAVENOUS
Status: DISCONTINUED | OUTPATIENT
Start: 2020-10-27 | End: 2020-10-27

## 2020-10-27 RX ORDER — MIDAZOLAM HYDROCHLORIDE 1 MG/ML
INJECTION, SOLUTION INTRAMUSCULAR; INTRAVENOUS
Status: DISCONTINUED | OUTPATIENT
Start: 2020-10-27 | End: 2020-10-27

## 2020-10-27 RX ORDER — LIDOCAINE HYDROCHLORIDE 10 MG/ML
INJECTION, SOLUTION EPIDURAL; INFILTRATION; INTRACAUDAL; PERINEURAL
Status: DISCONTINUED | OUTPATIENT
Start: 2020-10-27 | End: 2020-10-27

## 2020-10-27 RX ADMIN — CEFEPIME HYDROCHLORIDE 2 G: 2 INJECTION, SOLUTION INTRAVENOUS at 12:10

## 2020-10-27 RX ADMIN — BACLOFEN 10 MG: 10 TABLET ORAL at 09:10

## 2020-10-27 RX ADMIN — DEXAMETHASONE SODIUM PHOSPHATE 8 MG: 4 INJECTION, SOLUTION INTRA-ARTICULAR; INTRALESIONAL; INTRAMUSCULAR; INTRAVENOUS; SOFT TISSUE at 07:10

## 2020-10-27 RX ADMIN — FAMOTIDINE 20 MG: 10 INJECTION INTRAVENOUS at 09:10

## 2020-10-27 RX ADMIN — MICONAZOLE NITRATE: 2 OINTMENT TOPICAL at 08:10

## 2020-10-27 RX ADMIN — CHLORHEXIDINE GLUCONATE 0.12% ORAL RINSE 15 ML: 1.2 LIQUID ORAL at 09:10

## 2020-10-27 RX ADMIN — VANCOMYCIN HYDROCHLORIDE 750 MG: 750 INJECTION, POWDER, LYOPHILIZED, FOR SOLUTION INTRAVENOUS at 01:10

## 2020-10-27 RX ADMIN — LIDOCAINE HYDROCHLORIDE 50 MG: 10 INJECTION, SOLUTION EPIDURAL; INFILTRATION; INTRACAUDAL; PERINEURAL at 07:10

## 2020-10-27 RX ADMIN — PROPOFOL 90 MG: 10 INJECTION, EMULSION INTRAVENOUS at 07:10

## 2020-10-27 RX ADMIN — BACLOFEN 10 MG: 10 TABLET ORAL at 04:10

## 2020-10-27 RX ADMIN — PHENOBARBITAL 100 MG: 20 ELIXIR ORAL at 09:10

## 2020-10-27 RX ADMIN — PROPRANOLOL HYDROCHLORIDE 30 MG: 20 SOLUTION ORAL at 04:10

## 2020-10-27 RX ADMIN — CEFEPIME HYDROCHLORIDE 2 G: 2 INJECTION, SOLUTION INTRAVENOUS at 07:10

## 2020-10-27 RX ADMIN — SODIUM CHLORIDE 30 MG/ML INHALATION SOLUTION 4 ML: 30 SOLUTION INHALANT at 11:10

## 2020-10-27 RX ADMIN — FENTANYL CITRATE 50 MCG: 50 INJECTION, SOLUTION INTRAMUSCULAR; INTRAVENOUS at 07:10

## 2020-10-27 RX ADMIN — ENOXAPARIN SODIUM 30 MG: 100 INJECTION SUBCUTANEOUS at 04:10

## 2020-10-27 RX ADMIN — MIDAZOLAM 1 MG: 1 INJECTION INTRAMUSCULAR; INTRAVENOUS at 07:10

## 2020-10-27 RX ADMIN — PROPRANOLOL HYDROCHLORIDE 30 MG: 20 SOLUTION ORAL at 08:10

## 2020-10-27 RX ADMIN — SODIUM CHLORIDE 30 MG/ML INHALATION SOLUTION 4 ML: 30 SOLUTION INHALANT at 03:10

## 2020-10-27 RX ADMIN — GLYCOPYRROLATE 0.2 MG: 0.2 INJECTION, SOLUTION INTRAMUSCULAR; INTRAVENOUS at 07:10

## 2020-10-27 RX ADMIN — MUPIROCIN: 20 OINTMENT TOPICAL at 09:10

## 2020-10-27 RX ADMIN — ONDANSETRON 4 MG: 2 INJECTION, SOLUTION INTRAMUSCULAR; INTRAVENOUS at 07:10

## 2020-10-27 RX ADMIN — MORPHINE SULFATE 2 MG: 2 INJECTION, SOLUTION INTRAMUSCULAR; INTRAVENOUS at 04:10

## 2020-10-27 RX ADMIN — ROCURONIUM BROMIDE 40 MG: 10 INJECTION, SOLUTION INTRAVENOUS at 07:10

## 2020-10-27 RX ADMIN — SUCCINYLCHOLINE CHLORIDE 80 MG: 20 INJECTION, SOLUTION INTRAMUSCULAR; INTRAVENOUS at 07:10

## 2020-10-27 RX ADMIN — CEFEPIME HYDROCHLORIDE 2 G: 2 INJECTION, SOLUTION INTRAVENOUS at 04:10

## 2020-10-27 RX ADMIN — FAMOTIDINE 20 MG: 10 INJECTION INTRAVENOUS at 08:10

## 2020-10-27 RX ADMIN — PROPRANOLOL HYDROCHLORIDE 30 MG: 20 SOLUTION ORAL at 09:10

## 2020-10-27 RX ADMIN — SODIUM CHLORIDE, SODIUM LACTATE, POTASSIUM CHLORIDE, AND CALCIUM CHLORIDE: 600; 310; 30; 20 INJECTION, SOLUTION INTRAVENOUS at 07:10

## 2020-10-27 RX ADMIN — BACLOFEN 10 MG: 10 TABLET ORAL at 08:10

## 2020-10-27 RX ADMIN — GABAPENTIN 250 MG: 250 SUSPENSION ORAL at 08:10

## 2020-10-27 RX ADMIN — FLUCONAZOLE 100 MG: 40 POWDER, FOR SUSPENSION ORAL at 09:10

## 2020-10-27 RX ADMIN — CHLORHEXIDINE GLUCONATE 0.12% ORAL RINSE 15 ML: 1.2 LIQUID ORAL at 08:10

## 2020-10-27 RX ADMIN — ROCURONIUM BROMIDE 5 MG: 10 INJECTION, SOLUTION INTRAVENOUS at 07:10

## 2020-10-27 RX ADMIN — MICONAZOLE NITRATE: 2 OINTMENT TOPICAL at 09:10

## 2020-10-27 NOTE — PROGRESS NOTES
[facility] Hospital Medicine  Progress Note    Patient Name: Glen Moscoso  MRN: [id number]  Patient Class: IP- Inpatient   Admission Date: 10/22/2020  Length of Stay: 5 days  Attending Physician: Travis Bobo MD  Primary Care Provider: Yadi Lawson MD        Subjective:     Principal Problem:Right lower lobe pneumonia        HPI:  Glen Moscoso is a 25 y.o. male patient with a PMHx of cerebral palsy who presents to the Emergency Department for evaluation of respiratory distress which onset PTA. EMS reports pt was found face down on the pillow in nursing home. Pt arrives tachycardic and tachypneic. Patient recently discharge from [facility] where he was treated for respiratory failure and sepsis. Patient was discharged on 10/10/20 to [facility] on IV Vancomycin, Cefepime and Flagyl to treat pelvic osteomyelitis and resolving colitis. In the ED, WBCs 18.7K, Platelet 681, CO2 20, Lactate 2.3. UA negative. CXR revealed mild atelectasis or infiltrate right perihilar region and right midlung zone. Pt with worsening resp failure Subsequently pt was intubated. Sepsis protocol initiated in the ED including IV hydration. Hospital medicine called for admission. Patient placed in ICU. Patient is a full code, SDM mother, Leni Moscoso at [phone].     Overview/Hospital Course:  10/23  Remains intubated , enteral nutrition initiated. Continued treatment of sepsis due to aspiration pneumonia .due to poor air way protection .  Cultures are with negative growth to date. Temp Max 103 and wbc 19.30   Tachycardia was reported over night , follow up Echo EF 50 % normal diastolic function.  Case discussed with Dr Palma who has agreed for trach placement due to recurrent aspiration pneumonia.    10/24    Remains intubated, Tmax of 101.5 .CT Abd/pelvis - right inferior pubic ramus -cannot exclude     Osteomyelitis.      10/25   Self extubated, presently on Room air    Case discussed with Dr Palma who has agreed  for trach placement due to recurrent aspiration pneumonia.  10/26- Afebrile. Remains on RA with satisfactory SpO2. Trach placement planned for tomorrow . Wound vac placed to Rt./Lt ischium stage IV pressure ulcers with full thickness tissue loss with exposed bone. General Surgery consulted  to evaluate pt for diverting colostomy to prevent fecal contamination of wound . Rectal tube in place. Labs- WBC normalize 10.1, Hbg 9.2, Pl 512, creatinine 0.5   10/27- S/P tracheostomy placement today. Remains sedated on mechanical ventilation via  trach . Labs are unremarkable . General Surgery is recommending to continue wound vac and rectal tube for now , diverting colostomy is not suggested at this time.     Interval History:     S/P tracheostomy placement today. Remains sedated on mechanical ventilation via  trach     Review of Systems   Unable to perform ROS: Patient nonverbal     Objective:     Vital Signs (Most Recent):  Temp: 99 °F (37.2 °C) (10/27/20 1115)  Pulse: 93 (10/27/20 1400)  Resp: (!) 24 (10/27/20 1400)  BP: 133/82 (10/27/20 1400)  SpO2: 100 % (10/27/20 1400) Vital Signs (24h Range):  Temp:  [99 °F (37.2 °C)-101.3 °F (38.5 °C)] 99 °F (37.2 °C)  Pulse:  [] 93  Resp:  [16-42] 24  SpO2:  [91 %-100 %] 100 %  BP: ()/(65-92) 133/82     Weight: 34.3 kg (75 lb 9.9 oz)  Body mass index is 16.36 kg/m².    Intake/Output Summary (Last 24 hours) at 10/27/2020 1456  Last data filed at 10/27/2020 1400  Gross per 24 hour   Intake 2205 ml   Output 1600 ml   Net 605 ml      Physical Exam  Constitutional:       General: He is not in acute distress.     Appearance: He is well-developed. He is not diaphoretic.      Comments: Sedated on vent via trach   HENT:      Head: Normocephalic and atraumatic.      Mouth/Throat:      Pharynx: No oropharyngeal exudate.   Eyes:      Conjunctiva/sclera: Conjunctivae normal.      Pupils: Pupils are equal, round, and reactive to light.   Neck:      Musculoskeletal: Neck supple.       Thyroid: No thyromegaly.      Vascular: No JVD.      Comments: Trach in place   Cardiovascular:      Rate and Rhythm: Normal rate and regular rhythm.      Heart sounds: Normal heart sounds. No murmur.   Pulmonary:      Effort: No respiratory distress.      Breath sounds: No wheezing or rales.      Comments: On vent via trach . Breath sounds are coarse kathia   Chest:      Chest wall: No tenderness.   Abdominal:      General: Bowel sounds are normal. There is no distension.      Palpations: Abdomen is soft.      Tenderness: There is no abdominal tenderness. There is no guarding or rebound.   Musculoskeletal:         General: Deformity (upper and lower ext ) present.   Lymphadenopathy:      Cervical: No cervical adenopathy.   Skin:     Comments: Wound vac dressings in place kathia ischium     Neurological:      Comments: Sedated on vent currently          Significant Labs:   CBC:   Recent Labs   Lab 10/26/20  0334 10/27/20  0430   WBC 10.14 10.67   HGB 9.2* 9.6*   HCT 29.4* 31.4*   * 549*     CMP:   Recent Labs   Lab 10/26/20  0334 10/27/20  0430    139   K 3.5 4.0    104   CO2 29 27   GLU 98 98   BUN 3* 7   CREATININE 0.5 0.5   CALCIUM 8.7 9.0   PROT 6.5 7.0   ALBUMIN 1.9* 2.1*   BILITOT 0.1 0.1   ALKPHOS 108 108   AST 15 18   ALT 11 15   ANIONGAP 6* 8   EGFRNONAA >60 >60       Significant Imaging:       Assessment/Plan:      * Right lower lobe pneumonia  Recurrent Aspiration Pneumonia  S/P Trach placement 10/27/20  PEG tube for feeding and medication administration   Continue antibiotic for 5 to 7 days       On mechanically assisted ventilation        Tachycardia  Due to infection  ABX  Symptomatic care  Monitor        Osteomyelitis of pelvis  ABX  Wound Care  ID on Consult      Severe protein-calorie malnutrition  Enteral tube feeds   Continue supplements        Decubitus ulcer of ischial area, left, stage IV  Wound Care  Antibiotics per ID  PICC in place  Possible osteomyelitis   Wound vac placed    General Surgery consult to evaluate for diverting colostomy to prevent fecal contamination of wound.       Seizure disorder  Phenobarbital  Neuro checks    Cerebral palsy  Supportive care       VTE Risk Mitigation (From admission, onward)         Ordered     enoxaparin injection 30 mg  Every 24 hours      10/26/20 1447     IP VTE HIGH RISK PATIENT  Once      10/22/20 1825     Place sequential compression device  Until discontinued      10/22/20 1754                Discharge Planning   ROCÍO:      Code Status: Full Code   Is the patient medically ready for discharge?:     Reason for patient still in hospital (select all that apply): Patient trending condition  Discharge Plan A: Long-term acute care facility (LTAC)   Discharge Delays: (!) Change in Medical Condition        Critical care time spent on the evaluation and treatment of severe organ dysfunction, review of pertinent labs and imaging studies, discussions with consulting providers and discussions with patient/family: 30  minutes.      Travis Bobo MD  Department of Hospital Medicine   [facility]

## 2020-10-27 NOTE — PROGRESS NOTES
"  [facility]  Adult Nutrition  Progress Note    SUMMARY     Recommendations    Recommendation:   1. Change enteral nutrition to Nutren 1.5 @ 40mL/hr. Flushes per NP.  Provides 1440kcals, 58g protein, 744mL free water.   2. 1 packet arginaid via PEG, BID  3. RD to f/u    Goals: Meet >75% EEN/EPN by RD f/u  Nutrition Goal Status: goal met  Communication of RD Recs: POC, sticky note, second sign     Reason for Assessment    Reason For Assessment: follow up   Diagnosis: Pnuemonia, resp failure  Relevant Medical History: spastic cerebral palsy, colitis, severe contractures, seizure disorder, malnutrition, pnuemoperioneum, suspected colon perforation, GERD  Interdisciplinary Rounds: attended    General Information Comments:   10/23/20 RD consulted for TF recs. Pt was found down at his care facility and brought to [facility] last night. He was recently hospitalized with pnuemonia and pressure injuries and was seen by an RD and diagnosed as malnourished. He has a PEG tube, and rec above is for formula well tolerated at last admit. NFPE- pt remains malnourished, documented in greater detail below.   10/27/20 Trach placed this morning. Pt has had liquid stools which is at risk of contaminating decubitous wound, even with fecal management system. Recommending changed to an enteral formula without fiber to potentially lessen diarrhea. He has otherwise been tolerating.     Nutrition Discharge Planning: PEG feeding    Nutrition Risk Screen    Nutrition Risk Screen: large or nonhealing wound, burn or pressure injury, tube feeding or parenteral nutrition    Nutrition/Diet History    Food Allergies: NKFA  Factors Affecting Nutritional Intake: NPO, on mechanical ventilation  Nutrition Support Formula Prior to Admit: Peptamen 1.5 Prebio  Nutrition Support Rate Prior to Admit: 40 (ml)  Nutrtion Support Frequency Prior to Admit: continuous    Anthropometrics    Temp: 99 °F (37.2 °C)  Height Method: Estimated  Height: 4' 9" " (144.8 cm)  Height (inches): 57 in  Weight Method: Bed Scale  Weight: 34.3 kg (75 lb 9.9 oz)  Weight (lb): 75.62 lb  Ideal Body Weight (IBW), Male: 88 lb  % Ideal Body Weight, Male (lb): 80.42 %  BMI (Calculated): 16.4  BMI Grade: 16 - 16.9 protein-energy malnutrition grade II     Lab/Procedures/Meds  Pertinent Labs: reviewed  Albumin 2.1  Pertinent Medications: reviewed  Pepcid, propranolol, NaCl   Physical Findings/Assessment     Severe muscle contracture and pressure injuries    Estimated/Assessed Needs    Weight Used For Calorie Calculations: 34.3 kg (75 lb 9.9 oz)(actual weight)  Energy Calorie Requirements (kcal): 1650  Energy Need Method: [facility]  Protein Requirements: 40- 55g(1.2- 1.5g/kg per malnutrition)  Weight Used For Protein Calculations: 34.3 kg (75 lb 9.9 oz)     Estimated Fluid Requirement Method: RDA Method(or per MD)  RDA Method (mL): 1650     Nutrition Prescription Ordered    Current Diet Order: NPO  Current Nutrition Support Formula Ordered: Peptamen 1.5 w/Prebio  Current Nutrition Support Rate Ordered: 45 (ml)  Current Nutrition Support Frequency Ordered: continuous    Evaluation of Received Nutrient/Fluid Intake    Enteral Calories (kcal): 1620  Enteral Protein (gm): 73  Enteral (Free Water) Fluid (mL): 834  Other Calories (kcal): 27(Propofol @ 1mL/hr)    Intake/Output Summary (Last 24 hours) at 10/27/2020 1513  Last data filed at 10/27/2020 1400  Gross per 24 hour   Intake 1870 ml   Output 1525 ml   Net 345 ml     % Intake of Estimated Energy Needs: 75 - 100 %  % Meal Intake: NPO    Nutrition Risk    2x week    Assessment and Plan    Severe protein-calorie malnutrition  Nutrition Problem:  Severe Protein-Calorie Malnutrition  Malnutrition in the context of Chronic Illness/Injury    Related to (etiology):  Physiological causes resulting in anorexia or diminished intake    Signs and Symptoms (as evidenced by):  Body Fat Depletion: mild depletion of orbitals   Muscle Mass Depletion: mild and  moderate depletion of temples and clavicle region   Hx of cerebral palsy, malnutrition, wounds    Interventions(treatment strategy):  Enteral nutrition    Nutrition Diagnosis Status:  Improving       Monitor and Evaluation    Food and Nutrient Intake: energy intake  Food and Nutrient Adminstration: enteral and parenteral nutrition administration  Physical Activity and Function: nutrition-related ADLs and IADLs  Anthropometric Measurements: weight  Biochemical Data, Medical Tests and Procedures: electrolyte and renal panel, gastrointestinal profile, glucose/endocrine profile, inflammatory profile, lipid profile  Nutrition-Focused Physical Findings: overall appearance     Malnutrition Assessment  Malnutrition Type: chronic illness  Skin (Micronutrient): wounds unhealed   Micronutrient Evaluation Comments: hx of malnutrition       Orbital Region (Subcutaneous Fat Loss): mild depletion   Restorationism Region (Muscle Loss): mild depletion  Clavicle Bone Region (Muscle Loss): mild depletion               Nutrition Follow-Up    RD Follow-up?: Yes    Thanks!  Tamie Fontaine MPH RD LDN

## 2020-10-27 NOTE — PLAN OF CARE
Recommendations    Recommendation:   1. Change enteral nutrition to Nutren 1.5 @ 40mL/hr. Flushes per NP.  Provides 1440kcals, 58g protein, 744mL free water.   2. 1 packet arginaid via PEG, BID  3. RD to f/u    Goals: Meet >75% EEN/EPN by RD f/u  Nutrition Goal Status: goal met  Communication of RD Recs: POC, sticky note, second sign

## 2020-10-27 NOTE — PLAN OF CARE
Patient to OR at start of shift for trach placement. Mom and grandmother at bedside, consents done in room. Patient returned around 0845 to room with new trach in place; obturator taped above headboard. Patient placed on ventilator until sedation wears off. Attempted twice during shift to switch patient to spontaneous mode on vent; went apneic both times even though patient was awake. Will attempt again. All VSS; rizvi, FMS, and wound vac remained in placed, patent and draining. FMS continues to have leaking around site, frequent checks and turns done throughout shift to keep area and wound vac clean. Tube feedings resumed at 15cc/hr, will increase as tolerated to goal of 45 cc/hr. 200cc H2O bolus q4 done per order, tolerating well. Bilateral hand mittens remained in placed due to patient attempting to pull at tubing. Mom frequently updated during shift on patient status and POC after she left earlier in the shift. Patient turned q2, all pressure points padded.    Problem: Adult Inpatient Plan of Care  Goal: Plan of Care Review  Outcome: Ongoing, Progressing  Goal: Patient-Specific Goal (Individualization)  Outcome: Ongoing, Progressing  Goal: Absence of Hospital-Acquired Illness or Injury  Outcome: Ongoing, Progressing  Goal: Optimal Comfort and Wellbeing  Outcome: Ongoing, Progressing  Goal: Readiness for Transition of Care  Outcome: Ongoing, Progressing  Goal: Rounds/Family Conference  Outcome: Ongoing, Progressing

## 2020-10-27 NOTE — TRANSFER OF CARE
"Anesthesia Transfer of Care Note    Patient: Glen Moscoso    Procedure(s) Performed: Procedure(s) (LRB):  CREATION, TRACHEOSTOMY (N/A)    Patient location: ICU    Anesthesia Type: general    Transport from OR: Transported from OR intubated on 100% O2 by AMBU with adequate controlled ventilation    Post pain: adequate analgesia    Post assessment: no apparent anesthetic complications and tolerated procedure well    Post vital signs: stable    Level of consciousness: sedated    Nausea/Vomiting: no nausea/vomiting    Complications: none    Transfer of care protocol was followed      Last vitals:   Visit Vitals  /84 (BP Location: Right arm, Patient Position: Lying)   Pulse 105   Temp 37.4 °C (99.4 °F) (Axillary)   Resp (!) 28   Ht 4' 9" (1.448 m)   Wt 34.3 kg (75 lb 9.9 oz)   SpO2 95%   BMI 16.36 kg/m²     "

## 2020-10-27 NOTE — ASSESSMENT & PLAN NOTE
Returned from OR sedated on [facility] vent  Wean per protocol once fully awake  VAP prophylaxis

## 2020-10-27 NOTE — SUBJECTIVE & OBJECTIVE
Review of Systems   Unable to perform ROS: Patient nonverbal     Objective:     Vital Signs (Most Recent):  Temp: 99.4 °F (37.4 °C) (10/27/20 0305)  Pulse: 105 (10/27/20 0505)  Resp: (!) 28 (10/27/20 0505)  BP: 124/84 (10/27/20 0500)  SpO2: 100 % (10/27/20 0848) Vital Signs (24h Range):  Temp:  [98.2 °F (36.8 °C)-101.3 °F (38.5 °C)] 99.4 °F (37.4 °C)  Pulse:  [] 105  Resp:  [13-38] 28  SpO2:  [91 %-100 %] 100 %  BP: ()/(65-91) 124/84     Weight: 34.3 kg (75 lb 9.9 oz)  Body mass index is 16.36 kg/m².      Intake/Output Summary (Last 24 hours) at 10/27/2020 0929  Last data filed at 10/27/2020 0831  Gross per 24 hour   Intake 2375 ml   Output 2060 ml   Net 315 ml         Physical Exam  Vitals signs and nursing note reviewed.   Constitutional:       Appearance: He is underweight. He is ill-appearing.      Interventions: He is sedated and restrained.   HENT:      Head: Atraumatic.      Mouth/Throat:      Mouth: Mucous membranes are moist.   Eyes:      Conjunctiva/sclera: Conjunctivae normal.      Pupils: Pupils are equal, round, and reactive to light.   Neck:      Musculoskeletal: No edema.      Vascular: No JVD.      Comments: Severe bilateral upper and lower extremity contractures  Cardiovascular:      Rate and Rhythm: Regular rhythm. Tachycardia present.      Pulses:           Radial pulses are 2+ on the right side and 2+ on the left side.        Dorsalis pedis pulses are 1+ on the right side and 1+ on the left side.   Pulmonary:      Breath sounds: Decreased air movement present. Decreased breath sounds present. No wheezing, rhonchi or rales.      Comments: Mechanical ventilation via trach  Abdominal:      General: Abdomen is protuberant. Bowel sounds are normal. There is no distension.      Palpations: Abdomen is soft.      Tenderness: There is no abdominal tenderness.      Comments: Scarring around button PEG   Musculoskeletal:      Right lower leg: No edema.      Left lower leg: No edema.   Skin:      General: Skin is warm and dry.      Capillary Refill: Capillary refill takes 2 to 3 seconds.          Neurological:      GCS: GCS eye subscore is 3. GCS verbal subscore is 1. GCS motor subscore is 1.           Lines/Drains/Airways     Peripherally Inserted Central Catheter Line            PICC Double Lumen 10/22/20 1300 4 days          Drain                 Gastrostomy/Enterostomy 08/04/20 1653 Percutaneous endoscopic gastrostomy (PEG) feeding 83 days         Gastrostomy/Enterostomy 10/22/20 1300 LUQ 4 days         Urethral Catheter 10/22/20 1653 Latex 16 Fr. 4 days         Rectal Tube 10/25/20 1400 rectal tube w/ balloon (indicate number of mLs) 1 day          Airway                 Surgical Airway 10/27/20 0855 Shiley Cuffed less than 1 day                Significant Labs:    CBC/Anemia Profile:  Recent Labs   Lab 10/26/20  0334 10/27/20  0430   WBC 10.14 10.67   HGB 9.2* 9.6*   HCT 29.4* 31.4*   * 549*   MCV 92 93   RDW 13.8 13.8        Chemistries:  Recent Labs   Lab 10/26/20  0334 10/27/20  0430    139   K 3.5 4.0    104   CO2 29 27   BUN 3* 7   CREATININE 0.5 0.5   CALCIUM 8.7 9.0   ALBUMIN 1.9* 2.1*   PROT 6.5 7.0   BILITOT 0.1 0.1   ALKPHOS 108 108   ALT 11 15   AST 15 18   MG 2.6 2.1   PHOS 2.8 4.0       All pertinent labs within the past 24 hours have been reviewed.    Significant Imaging:  I have reviewed all pertinent imaging results/findings within the past 24 hours.

## 2020-10-27 NOTE — PLAN OF CARE
A: Awake    RASS: Goal - RASS Goal: 0-->alert and calm  Actual - RASS ([place] Agitation-Sedation Scale): 0-->alert and calm   Restraint necessity: Clinical Justification: Removing medical devices, Treatment Interference  B: Breath   SBT: Not intubated   C: Coordinate A & B, analgesics/sedatives   Pain: managed    SAT: Not intubated  D: Delirium   CAM-ICU: Overall CAM-ICU: Positive  E: Early Mobility   MOVE Screen: Fail   Activity: Activity Management: arm raise - L1  FAS: Feeding/Nutrition   Diet order: Diet/Nutrition Received: TPN (total parenteral nutrition),   Fluid restriction:    T: Thrombus   DVT prophylaxis: VTE Required Core Measure: Pharmacological prophylaxis initiated/maintained  H: HOB Elevation   Head of Bed (HOB): HOB at 20-30 degrees  U: Ulcer Prophylaxis   GI: yes  G: Glucose control   managed    S: Skin   Bundle compliance: yes   Bathing/Skin Care: bath, complete, linen changed, incontinence care   B: Bowel Function   diarrhea, flexiseal in place  I: Indwelling Catheters   Iglesias necessity:      Urethral Catheter 10/22/20 1653 Latex 16 Fr.-Reason for Continuing Urinary Catheterization: Critically ill in ICU requiring intensive monitoring   CVC necessity: Yes   IPAD offered: Not appropriate  D: De-escalation Antibx   Yes  Plan for the day  Tube feedings stopped at midnight per order. Patient scheduled for trach placement this AM. Spoke to his mother on phone updated her on pt status and she states she will be here to sign consent papers   Family/Goals of care/Code Status   Code Status: Full Code   I/O this shift:  In: 785 [NG/GT:485; IV Piggyback:300]  Out: 975 [Urine:950; Other:25]     Temp:  [99.1 °F (37.3 °C)-101.3 °F (38.5 °C)]   Pulse:  []   Resp:  [24-42]   BP: (105-137)/(68-85)   SpO2:  [91 %-99 %]      patient progressing towards goals as tolerated, plan of care reviewed with Glen Moscoso and family, all concerns addressed, will continue to monitor.

## 2020-10-27 NOTE — ASSESSMENT & PLAN NOTE
Post op care  Routine trach care  Family will need teaching on trach care and home medical equipment arranged once ready to transition home from other medical issues

## 2020-10-27 NOTE — PROGRESS NOTES
[facility] -   Critical Care Medicine  Progress Note    Patient Name: Glen Moscoso  MRN: [id number]  Admission Date: 10/22/2020  Hospital Length of Stay: 5 days  Code Status: Full Code  Attending Provider: Travis Bobo MD  Primary Care Provider: Yadi Lawson MD   Principal Problem: Acute hypoxemic respiratory failure    Subjective:     HPI:  25 year old male with spastic cerebral palsy, severe contractures, seizure disorder, malnutrition  Presented to ED from LTAC when found face down, unresponsive, with hypoxia  OF NOTE - recent hospitalizations x 2 for pneumonia (serratia), second hospitalization complicated with osteomyelitis from sacral decubitus (underwent surgical I&D) and pneumoperitoneum from suspected colonic perforation that was managed conservatively    On arrival today respiratory rate 50, , sat 89%, SBP 170s, and fever 102  Intubated on ED arrival with anesthesia assistance  Labs revealed leukocytosis, lactic acid 2.3,and procalcitonin 0.12  CT chest abd pelvis revealed - Right lower lobe probable pneumonia.  Osseous erosion could appear similarly but is less favored.  Additional scattered areas of consolidation in the right lung likely also related to infection.  There is a large decubitus ulcer right greater than left. The right ulcer tracks to the posterior aspect of the right inferior pubic ramus. Osteomyelitis cannot be excluded.     Hospital/ICU Course:  Arrived to ICU with OETT to mechanical ventilation, propofol sedation  10/23 - remains intubated on mechanical ventilation; oxygen demand decreased; large amount of thick secretions from trach and oral pharynx  10/24 - Intubated, improved aeration of lung  10/25 - self extubated yesterday; self removed midline overnight  10/26 - weaned to room air; continue drainage from wounds and frequent liquid stool contamination required flexiseal system and wound vac application  10/27 - seen and examined on return from OR  tracheostomy placement; remains sedated on mechanical ventilation via new #8 shiley trach with obturator at head of bed    Review of Systems   Unable to perform ROS: Patient nonverbal     Objective:     Vital Signs (Most Recent):  Temp: 99.4 °F (37.4 °C) (10/27/20 0305)  Pulse: 105 (10/27/20 0505)  Resp: (!) 28 (10/27/20 0505)  BP: 124/84 (10/27/20 0500)  SpO2: 100 % (10/27/20 0848) Vital Signs (24h Range):  Temp:  [98.2 °F (36.8 °C)-101.3 °F (38.5 °C)] 99.4 °F (37.4 °C)  Pulse:  [] 105  Resp:  [13-38] 28  SpO2:  [91 %-100 %] 100 %  BP: ()/(65-91) 124/84     Weight: 34.3 kg (75 lb 9.9 oz)  Body mass index is 16.36 kg/m².      Intake/Output Summary (Last 24 hours) at 10/27/2020 0929  Last data filed at 10/27/2020 0831  Gross per 24 hour   Intake 2375 ml   Output 2060 ml   Net 315 ml         Physical Exam  Vitals signs and nursing note reviewed.   Constitutional:       Appearance: He is underweight. He is ill-appearing.      Interventions: He is sedated and restrained.   HENT:      Head: Atraumatic.      Mouth/Throat:      Mouth: Mucous membranes are moist.   Eyes:      Conjunctiva/sclera: Conjunctivae normal.      Pupils: Pupils are equal, round, and reactive to light.   Neck:      Musculoskeletal: No edema.      Vascular: No JVD.      Comments: Severe bilateral upper and lower extremity contractures  Cardiovascular:      Rate and Rhythm: Regular rhythm. Tachycardia present.      Pulses:           Radial pulses are 2+ on the right side and 2+ on the left side.        Dorsalis pedis pulses are 1+ on the right side and 1+ on the left side.   Pulmonary:      Breath sounds: Decreased air movement present. Decreased breath sounds present. No wheezing, rhonchi or rales.      Comments: Mechanical ventilation via trach  Abdominal:      General: Abdomen is protuberant. Bowel sounds are normal. There is no distension.      Palpations: Abdomen is soft.      Tenderness: There is no abdominal tenderness.       Comments: Scarring around button PEG   Musculoskeletal:      Right lower leg: No edema.      Left lower leg: No edema.   Skin:     General: Skin is warm and dry.      Capillary Refill: Capillary refill takes 2 to 3 seconds.          Neurological:      GCS: GCS eye subscore is 3. GCS verbal subscore is 1. GCS motor subscore is 1.           Lines/Drains/Airways     Peripherally Inserted Central Catheter Line            PICC Double Lumen 10/22/20 1300 4 days          Drain                 Gastrostomy/Enterostomy 08/04/20 1653 Percutaneous endoscopic gastrostomy (PEG) feeding 83 days         Gastrostomy/Enterostomy 10/22/20 1300 LUQ 4 days         Urethral Catheter 10/22/20 1653 Latex 16 Fr. 4 days         Rectal Tube 10/25/20 1400 rectal tube w/ balloon (indicate number of mLs) 1 day          Airway                 Surgical Airway 10/27/20 0855 Shiley Cuffed less than 1 day                Significant Labs:    CBC/Anemia Profile:  Recent Labs   Lab 10/26/20  0334 10/27/20  0430   WBC 10.14 10.67   HGB 9.2* 9.6*   HCT 29.4* 31.4*   * 549*   MCV 92 93   RDW 13.8 13.8        Chemistries:  Recent Labs   Lab 10/26/20  0334 10/27/20  0430    139   K 3.5 4.0    104   CO2 29 27   BUN 3* 7   CREATININE 0.5 0.5   CALCIUM 8.7 9.0   ALBUMIN 1.9* 2.1*   PROT 6.5 7.0   BILITOT 0.1 0.1   ALKPHOS 108 108   ALT 11 15   AST 15 18   MG 2.6 2.1   PHOS 2.8 4.0       All pertinent labs within the past 24 hours have been reviewed.    Significant Imaging:  I have reviewed all pertinent imaging results/findings within the past 24 hours.      ABG  Recent Labs   Lab 10/24/20  0451   PH 7.398   PO2 140*   PCO2 40.4   HCO3 24.9   BE 0     Assessment/Plan:     Neuro  Seizure disorder  Continue home dose phenobarbital    Cerebral palsy  continue baclofen, neurontin    ENT  Status post tracheostomy  Post op care  Routine trach care  Family will need teaching on trach care and home medical equipment arranged once ready to transition  home from other medical issues    Derm  Decubitus ulcer of ischial area, left, stage IV  Appreciate [facility] eval, follow care recs  10/25 - increased drainage and frequent stool contamination per nursing staff - place fecal management system and plan discuss with [facility] tomorrow, could benefit from wound vac placement  10/26 - reviewed with [facility] RN, wound vac applied; recommendation from [facility] for consideration of diverting ostomy noted, will discuss with attending MD  10/27-  Wound vac in place; contamination reported overnight despite fecal management system; discussed diverting ostomy with attending and general surgery yesterday - goal avoid additional surgery and appliance if able, continue vac and flexiseal for now and monitor    Pulmonary  On mechanically assisted ventilation  Returned from OR sedated on mech vent  Wean per protocol once fully awake  VAP prophylaxis    Right lower lobe pneumonia  Recent flouroquinolone resistant seratia pneumonia  Empiric vanc, zosyn  Culture endotracheal aspirate  Monitor temp, wbc, culture data  Recommend trach placement for airway protection from recurrent aspiration pneumonias; discussed with mother and answered all questions. Consulted ENT and spoke with Dr Palma who will plan for trach placement on Tuesday 10/27/20 at 8am  10/25 - self extubated and no indication for reintubation now but trach placement for airway protection remains recommendation   10/26 - plan for trach placement for airway protection from ongoing aspiration tomorrow; would complete 7-10 day antibiotic course given complicated recent lung history (currently day 5)  10/27 - trach in place for protection from ongoing aspiration of secretions; cotninue abx day 6    Cardiac/Vascular  Tachycardia  Suspect baseline sinus tachycardia exacerbated by fever, sepsis  Holding enteral propanolol given marginal BP  Optimize analgesia, sedation  10/23 - resume home dose propanolol    ID  Osteomyelitis of pelvis  Transferred to  LTAC on 10/10 with plan for IV abx and recs to continue until 10/22  Currently on abx coverage for aspiration pneumonia   If concern for ongoing osteo, will likely need MRI  10/26 - no current evidence for ongoing osteomyelitis; monitor    Endocrine  Severe protein-calorie malnutrition  TF per RD recs      Critical Care Daily Checklist:    A: Awake: RASS Goal/Actual Goal: RASS Goal: 0-->alert and calm  Actual: [place] Agitation Sedation Scale (RASS): Restless   B: Spontaneous Breathing Trial Performed? Spon. Breathing Trial Initiated?: (S) Initiated (10/23/20 0800)   C: SAT & SBT Coordinated?  yes                      D: Delirium: CAM-ICU Overall CAM-ICU: Positive   E: Early Mobility Performed? Yes   F: Feeding Goal: Goals: Meet >75% EEN/EPN by RD f/u  Status: Nutrition Goal Status: new   Current Diet Order   Procedures    Diet NPO      AS: Analgesia/Sedation prn   T: Thromboembolic Prophylaxis lovenox resume post op   H: HOB > 300 Yes   U: Stress Ulcer Prophylaxis (if needed) pepcid   G: Glucose Control monitoring   B: Bowel Function Stool Occurrence: 1(leaking around FMS)   I: Indwelling Catheter (Lines & Iglesias) Necessity reviewed   D: De-escalation of Antimicrobials/Pharmacotherapies reviewed    Plan for the day/ETD Wean to trach collar    Code Status:  Family/Goals of Care: Full Code  Anticipate LTAC on discharge   I have discussed case and plan of care in detail with Dr Block; Status and plan of care were discussed with team on multidisciplinary rounds.     Critical Care Time: 50 minutes  Critical secondary to Patient has a condition that poses threat to life and bodily function: mechanical ventilation post op   Critical care was time spent personally by me on the following activities: development of treatment plan with patient or surrogate and bedside caregivers, discussions with consultants, evaluation of patient's response to treatment, examination of patient, ordering and performing treatments and  interventions, ordering and review of laboratory studies, ordering and review of radiographic studies, pulse oximetry, re-evaluation of patient's condition. This critical care time did not overlap with that of any other provider or involve time for any procedures.  JEFF Panchal-BC  Critical Care Medicine  [facility] [place]

## 2020-10-27 NOTE — SUBJECTIVE & OBJECTIVE
Interval History:     S/P tracheostomy placement today. Remains sedated on mechanical ventilation via  trach     Review of Systems   Unable to perform ROS: Patient nonverbal     Objective:     Vital Signs (Most Recent):  Temp: 99 °F (37.2 °C) (10/27/20 1115)  Pulse: 93 (10/27/20 1400)  Resp: (!) 24 (10/27/20 1400)  BP: 133/82 (10/27/20 1400)  SpO2: 100 % (10/27/20 1400) Vital Signs (24h Range):  Temp:  [99 °F (37.2 °C)-101.3 °F (38.5 °C)] 99 °F (37.2 °C)  Pulse:  [] 93  Resp:  [16-42] 24  SpO2:  [91 %-100 %] 100 %  BP: ()/(65-92) 133/82     Weight: 34.3 kg (75 lb 9.9 oz)  Body mass index is 16.36 kg/m².    Intake/Output Summary (Last 24 hours) at 10/27/2020 1456  Last data filed at 10/27/2020 1400  Gross per 24 hour   Intake 2205 ml   Output 1600 ml   Net 605 ml      Physical Exam  Constitutional:       General: He is not in acute distress.     Appearance: He is well-developed. He is not diaphoretic.      Comments: Sedated on vent via trach   HENT:      Head: Normocephalic and atraumatic.      Mouth/Throat:      Pharynx: No oropharyngeal exudate.   Eyes:      Conjunctiva/sclera: Conjunctivae normal.      Pupils: Pupils are equal, round, and reactive to light.   Neck:      Musculoskeletal: Neck supple.      Thyroid: No thyromegaly.      Vascular: No JVD.      Comments: Trach in place   Cardiovascular:      Rate and Rhythm: Normal rate and regular rhythm.      Heart sounds: Normal heart sounds. No murmur.   Pulmonary:      Effort: No respiratory distress.      Breath sounds: No wheezing or rales.      Comments: On vent via trach . Breath sounds are coarse kathia   Chest:      Chest wall: No tenderness.   Abdominal:      General: Bowel sounds are normal. There is no distension.      Palpations: Abdomen is soft.      Tenderness: There is no abdominal tenderness. There is no guarding or rebound.   Musculoskeletal:         General: Deformity (upper and lower ext ) present.   Lymphadenopathy:      Cervical: No  cervical adenopathy.   Skin:     Comments: Wound vac dressings in place kathia ischium     Neurological:      Comments: Sedated on vent currently          Significant Labs:   CBC:   Recent Labs   Lab 10/26/20  0334 10/27/20  0430   WBC 10.14 10.67   HGB 9.2* 9.6*   HCT 29.4* 31.4*   * 549*     CMP:   Recent Labs   Lab 10/26/20  0334 10/27/20  0430    139   K 3.5 4.0    104   CO2 29 27   GLU 98 98   BUN 3* 7   CREATININE 0.5 0.5   CALCIUM 8.7 9.0   PROT 6.5 7.0   ALBUMIN 1.9* 2.1*   BILITOT 0.1 0.1   ALKPHOS 108 108   AST 15 18   ALT 11 15   ANIONGAP 6* 8   EGFRNONAA >60 >60       Significant Imaging:

## 2020-10-27 NOTE — OP NOTE
Operative Note       Surgery Date: 10/27/2020     Surgeon: Kar Palma MD    Pre-op Diagnosis:  Recurrent aspiration    Post-op Diagnosis: same    Anesthesia: GETA    Technical Procedures Used:     Tracheostomy with size 8 cuffed Shiley tracheostomy    Complications: No    Estimated Blood Loss:minimal          Justification for the operation:     25 year old with cerebral palsy and recurrent aspiration.  Consent obtained from the patient's mother.     Procedure in Detail:    Informed consent was reviewed. The patient was delivered to the OR and placed in the supine position. The table remained in place with the HOB elevated. Landmarks were palpated.  Planned incision site was marked and injected with 1 cc of 1% lidocaine with 1:100,000 epinephrine. A horizontally oriented incision was made. Bovie dissection was employed to remove fat overlying the strap muscles. The strap muscles were pulled laterally to identify the midline with hemostat (with bovie) separation of the straps in the midline. The cricoid and above the thyroid isthmus was identified by inspection and palpation and then opened with Bovie on cautery mode. With tips of a hemostat directed toward the trach, blunt dissection with the tips of the hemostat identifed the anterior tracheal wall. The hemostat was redirected inferiorly to separate the posterior aspect of the thyroid isthmus from the trachea. The thyroid isthmus was divided. The tracheal rings were identified. The anesthesiologist was requested to deflate the cuff of the ETT and advance the tip into the right mainstem. The second tracheal ring was then removed and the trach  used to dilate the opening. The ET tube was pulled back out of the mainstem and cuff inflated with ventilation restored through the ETT.  The tracheostomy tube with obturator was then placed. The inner canula was placed, and placement of the tube was confirmed with CO2 return on the anesthesia monitor.  The  tracheostomy was then secured with 5 sutures and trach ties.  The patient was then turned over the the anesthesia team.             Disposition: PACU - hemodynamically stable.           Condition: Good    Attestation:  I was present and scrubbed for the entire procedure.

## 2020-10-27 NOTE — PLAN OF CARE
F/U with several LTACs from blanket referral.  Several denials.  Hard fax to [facility]'s LTAC and [facility] for review.  Updated Elisa, nurse with  [facility] who stated at this point they do not have enough staff to care for pt and they need training for trach care.  Staffing may change at time of DC. Elisa stated pt have SN benefits in his waiver, but would have to check and F/U.  Spoke with ANGELO Driscoll with [facility], [phone] who cared for pt previously.  [facility] stated  that at this point they do not have enough staff to meet pt's needs, but to F/U just prior to DC. [facility] is able to to accept pt per liaison with trach care and wound care management.  Anticipated DC disposition will be [facility] pending medical clearance. Updated Leni pt's mother who verbalized understanding that [facility] is likely disposition.    10/27/20 1400   Post-Acute Status   Post-Acute Authorization Placement   Part D Coverage na   Discharge Delays (!) Change in Medical Condition   Discharge Plan   Discharge Plan A Long-term acute care facility (LTAC)

## 2020-10-27 NOTE — ASSESSMENT & PLAN NOTE
Recurrent Aspiration Pneumonia  S/P Trach placement 10/27/20  PEG tube for feeding and medication administration   Continue antibiotic for 5 to 7 days

## 2020-10-27 NOTE — INTERVAL H&P NOTE
The patient has been examined and the H&P has been reviewed:    The patient has been extubated but due to recurrent aspiration, the ICU has requested tracheostomy.    Surgery risks, benefits and alternative options discussed and understood by patient/family.          Active Hospital Problems    Diagnosis  POA    *Acute hypoxemic respiratory failure [J96.01]  Yes    Tachycardia [R00.0]  Yes    Right lower lobe pneumonia [J18.9]  Yes    Osteomyelitis of pelvis [M86.9]  Yes    Severe protein-calorie malnutrition [E43]  Yes    Decubitus ulcer of ischial area, left, stage IV [L89.324]  Yes    Seizure disorder [G40.909]  Yes    Cerebral palsy [G80.9]  Yes      Resolved Hospital Problems   No resolved problems to display.

## 2020-10-27 NOTE — PROGRESS NOTES
Pharmacokinetic Assessment Follow Up: IV Vancomycin    Vancomycin serum concentration assessment(s):    The trough level was drawn correctly and can be used to guide therapy at this time. The measurement is above the desired definitive target range of 15 to 20 mcg/mL.    Vancomycin Regimen Plan:    Change regimen to Vancomycin 750 mg IV every 12 hours with next serum trough concentration measured at 0900 prior to next dose on 10/27    Drug levels (last 3 results):  Recent Labs   Lab Result Units 10/24/20  1625 10/25/20  0442 10/26/20  1659   Vancomycin-Trough ug/mL 14.0 11.8 21.8       Pharmacy will continue to follow and monitor vancomycin.    Please contact pharmacy at extension [phone] for questions regarding this assessment.    Thank you for the consult,   Ruben Doherty       Patient brief summary:  Glen Moscoso is a 25 y.o. male initiated on antimicrobial therapy with IV Vancomycin for treatment of bone/joint infection    The patient's current regimen is Vancomycin 750 mg q12.    Drug Allergies:   Review of patient's allergies indicates:  No Known Allergies    Actual Body Weight:   34.3 kg    Renal Function:   Estimated Creatinine Clearance: 111.5 mL/min (based on SCr of 0.5 mg/dL).,     Dialysis Method (if applicable):  N/A    CBC (last 72 hours):  Recent Labs   Lab Result Units 10/24/20  0425 10/25/20  0442 10/26/20  0334   WBC K/uL 9.82 10.77 10.14   Hemoglobin g/dL 7.1* 9.5* 9.2*   Hematocrit % 23.1* 30.2* 29.4*   Platelets K/uL 436* 533* 512*   Gran% % 67.9 79.1* 70.7   Lymph% % 12.4* 8.3* 12.9*   Mono% % 12.0 11.8 10.5   Eosinophil% % 6.9 0.0 5.0   Basophil% % 0.1 0.2 0.2   Differential Method  Automated Automated Automated       Metabolic Panel (last 72 hours):  Recent Labs   Lab Result Units 10/24/20  0425 10/25/20  0442 10/26/20  0334   Sodium mmol/L 136 142 141   Potassium mmol/L 3.6 3.6 3.5   Chloride mmol/L 104 107 106   CO2 mmol/L 26 29 29   Glucose mg/dL 107 113* 98   BUN, Bld mg/dL 5* 5* 3*    Creatinine mg/dL 0.5 0.5 0.5   Albumin g/dL 1.7* 1.9* 1.9*   Total Bilirubin mg/dL 0.1 0.3 0.1   Alkaline Phosphatase U/L 142* 123 108   AST U/L 20 13 15   ALT U/L 19 13 11   Magnesium mg/dL 2.1 2.0 2.6   Phosphorus mg/dL 3.2 2.4* 2.8       Vancomycin Administrations:  vancomycin given in the last 96 hours                   vancomycin in dextrose 5 % 1 gram/250 mL IVPB 1,000 mg (mg) 1,000 mg New Bag 10/26/20 0448     1,000 mg New Bag 10/25/20 1719     1,000 mg New Bag  0543    vancomycin 750 mg in dextrose 5 % 250 mL IVPB (ready to mix system) (mg) 750 mg New Bag 10/24/20 1733     750 mg New Bag  0542     750 mg New Bag 10/23/20 1703    vancomycin in dextrose 5 % 1 gram/250 mL IVPB 1,000 mg (mg) 1,000 mg New Bag 10/23/20 0447                Microbiologic Results:  Microbiology Results (last 7 days)     Procedure Component Value Units Date/Time    Blood culture x two cultures. Draw prior to antibiotics. [[id number]] Collected: 10/22/20 1530    Order Status: Completed Specimen: Blood from Peripheral, Upper Arm, Right Updated: 10/25/20 2312     Blood Culture, Routine No Growth to date      No Growth to date      No Growth to date      No Growth to date    Narrative:      Aerobic and anaerobic    Blood culture x two cultures. Draw prior to antibiotics. [[id number]] Collected: 10/22/20 1545    Order Status: Completed Specimen: Blood from Peripheral, Upper Arm, Right Updated: 10/25/20 2312     Blood Culture, Routine No Growth to date      No Growth to date      No Growth to date      No Growth to date    Narrative:      Aerobic and anaerobic    Culture, Respiratory with Gram Stain [[id number]]     Order Status: No result Specimen: Respiratory from Endotracheal Aspirate

## 2020-10-27 NOTE — PLAN OF CARE
10/27/20 1504   Discharge Reassessment   Assessment Type Discharge Planning Reassessment   Provided patient/caregiver education on the expected discharge date and the discharge plan Yes   Do you have any problems affording any of your prescribed medications? No   Discharge Plan A Long-term acute care facility (LTAC)   DME Needed Upon Discharge  none   Patient choice form signed by patient/caregiver N/A   Anticipated Discharge Disposition LTAC   Can the patient/caregiver answer the patient profile reliably? No, cognitively impaired   How does the patient rate their overall health at the present time?   (NICOLA)   Describe the patient's ability to walk at the present time. Does not walk or unable to take any steps at all   How often would a person be available to care for the patient? Whenever needed   Number of comorbid conditions (as recorded on the chart) Five or more   During the past month, has the patient often been bothered by feeling down, depressed or hopeless?   (NICOLA)   During the past month, has the patient often been bothered by little interest or pleasure in doing things?   (NICOLA)   Post-Acute Status   Post-Acute Authorization Placement   Post-Acute Placement Status Awaiting Internal Medical Clearance

## 2020-10-27 NOTE — ANESTHESIA POSTPROCEDURE EVALUATION
Anesthesia Post Evaluation    Patient: Glen Moscoso    Procedure(s) Performed: Procedure(s) (LRB):  CREATION, TRACHEOSTOMY (N/A)    Final Anesthesia Type: general    Patient location during evaluation: PACU  Patient participation: No - Unable to Participate, Coma/Other Inability to Communicate  Level of consciousness: obtunded/minimal responses  Post-procedure vital signs: reviewed and stable  Pain management: adequate  Airway patency: patent    PONV status at discharge: No PONV  Anesthetic complications: no      Cardiovascular status: blood pressure returned to baseline  Respiratory status: spontaneous ventilation and intubated (Tracheostomy)  Hydration status: euvolemic  Follow-up not needed.          Vitals Value Taken Time   /90 10/27/20 1016   Temp 37.2 °C (99 °F) 10/27/20 0915   Pulse 86 10/27/20 1022   Resp 24 10/27/20 1022   SpO2 100 % 10/27/20 1022   Vitals shown include unvalidated device data.      No case tracking events are documented in the log.      Pain/Lulú Score: Pain Rating Prior to Med Admin: 0 (10/26/2020  7:20 PM)  Pain Rating Post Med Admin: 0 (10/26/2020 11:37 AM)

## 2020-10-27 NOTE — ASSESSMENT & PLAN NOTE
Recent flouroquinolone resistant seratia pneumonia  Empiric vanc, zosyn  Culture endotracheal aspirate  Monitor temp, wbc, culture data  Recommend trach placement for airway protection from recurrent aspiration pneumonias; discussed with mother and answered all questions. Consulted ENT and spoke with Dr Palma who will plan for trach placement on Tuesday 10/27/20 at 8am  10/25 - self extubated and no indication for reintubation now but trach placement for airway protection remains recommendation   10/26 - plan for trach placement for airway protection from ongoing aspiration tomorrow; would complete 7-10 day antibiotic course given complicated recent lung history (currently day 5)  10/27 - trach in place for protection from ongoing aspiration of secretions; cotninue abx day 6

## 2020-10-27 NOTE — ASSESSMENT & PLAN NOTE
Appreciate [facility] eval, follow care recs  10/25 - increased drainage and frequent stool contamination per nursing staff - place fecal management system and plan discuss with [facility] tomorrow, could benefit from wound vac placement  10/26 - reviewed with [facility] RN, wound vac applied; recommendation from [facility] for consideration of diverting ostomy noted, will discuss with attending MD  10/27-  Wound vac in place; contamination reported overnight despite fecal management system; discussed diverting ostomy with attending and general surgery yesterday - goal avoid additional surgery and appliance if able, continue vac and flexiseal for now and monitor

## 2020-10-28 ENCOUNTER — ANESTHESIA EVENT (OUTPATIENT)
Dept: SURGERY | Facility: HOSPITAL | Age: 25
DRG: 003 | End: 2020-10-28
Payer: MEDICAID

## 2020-10-28 PROBLEM — Z99.11 ON MECHANICALLY ASSISTED VENTILATION: Status: RESOLVED | Noted: 2020-10-27 | Resolved: 2020-10-28

## 2020-10-28 LAB
ALBUMIN SERPL BCP-MCNC: 2 G/DL (ref 3.5–5.2)
ALP SERPL-CCNC: 162 U/L (ref 55–135)
ALT SERPL W/O P-5'-P-CCNC: 31 U/L (ref 10–44)
ANION GAP SERPL CALC-SCNC: 8 MMOL/L (ref 8–16)
AST SERPL-CCNC: 33 U/L (ref 10–40)
BASOPHILS # BLD AUTO: 0.02 K/UL (ref 0–0.2)
BASOPHILS NFR BLD: 0.2 % (ref 0–1.9)
BILIRUB SERPL-MCNC: 0.1 MG/DL (ref 0.1–1)
BUN SERPL-MCNC: 12 MG/DL (ref 6–20)
CALCIUM SERPL-MCNC: 8.5 MG/DL (ref 8.7–10.5)
CHLORIDE SERPL-SCNC: 104 MMOL/L (ref 95–110)
CO2 SERPL-SCNC: 25 MMOL/L (ref 23–29)
CREAT SERPL-MCNC: 0.6 MG/DL (ref 0.5–1.4)
DIFFERENTIAL METHOD: ABNORMAL
EOSINOPHIL # BLD AUTO: 0 K/UL (ref 0–0.5)
EOSINOPHIL NFR BLD: 0.1 % (ref 0–8)
ERYTHROCYTE [DISTWIDTH] IN BLOOD BY AUTOMATED COUNT: 14.2 % (ref 11.5–14.5)
EST. GFR  (AFRICAN AMERICAN): >60 ML/MIN/1.73 M^2
EST. GFR  (NON AFRICAN AMERICAN): >60 ML/MIN/1.73 M^2
GLUCOSE SERPL-MCNC: 95 MG/DL (ref 70–110)
HCT VFR BLD AUTO: 29.1 % (ref 40–54)
HGB BLD-MCNC: 9.1 G/DL (ref 14–18)
IMM GRANULOCYTES # BLD AUTO: 0.15 K/UL (ref 0–0.04)
IMM GRANULOCYTES NFR BLD AUTO: 1.6 % (ref 0–0.5)
LYMPHOCYTES # BLD AUTO: 1.7 K/UL (ref 1–4.8)
LYMPHOCYTES NFR BLD: 18.5 % (ref 18–48)
MAGNESIUM SERPL-MCNC: 2 MG/DL (ref 1.6–2.6)
MCH RBC QN AUTO: 29.1 PG (ref 27–31)
MCHC RBC AUTO-ENTMCNC: 31.3 G/DL (ref 32–36)
MCV RBC AUTO: 93 FL (ref 82–98)
MONOCYTES # BLD AUTO: 1.2 K/UL (ref 0.3–1)
MONOCYTES NFR BLD: 13.2 % (ref 4–15)
NEUTROPHILS # BLD AUTO: 6.2 K/UL (ref 1.8–7.7)
NEUTROPHILS NFR BLD: 66.4 % (ref 38–73)
NRBC BLD-RTO: 0 /100 WBC
PHOSPHATE SERPL-MCNC: 4.4 MG/DL (ref 2.7–4.5)
PLATELET # BLD AUTO: 489 K/UL (ref 150–350)
PMV BLD AUTO: 8.9 FL (ref 9.2–12.9)
POTASSIUM SERPL-SCNC: 3.9 MMOL/L (ref 3.5–5.1)
PROT SERPL-MCNC: 6.7 G/DL (ref 6–8.4)
RBC # BLD AUTO: 3.13 M/UL (ref 4.6–6.2)
SODIUM SERPL-SCNC: 137 MMOL/L (ref 136–145)
VANCOMYCIN SERPL-MCNC: 15.7 UG/ML
VANCOMYCIN SERPL-MCNC: 21.3 UG/ML
VANCOMYCIN TROUGH SERPL-MCNC: 25.8 UG/ML (ref 10–22)
WBC # BLD AUTO: 9.29 K/UL (ref 3.9–12.7)

## 2020-10-28 PROCEDURE — 63600175 PHARM REV CODE 636 W HCPCS: Performed by: INTERNAL MEDICINE

## 2020-10-28 PROCEDURE — 94760 N-INVAS EAR/PLS OXIMETRY 1: CPT

## 2020-10-28 PROCEDURE — 97605 NEG PRS WND THER DME<=50SQCM: CPT

## 2020-10-28 PROCEDURE — 99233 SBSQ HOSP IP/OBS HIGH 50: CPT | Mod: ,,, | Performed by: COLON & RECTAL SURGERY

## 2020-10-28 PROCEDURE — 27000221 HC OXYGEN, UP TO 24 HOURS

## 2020-10-28 PROCEDURE — 36600 WITHDRAWAL OF ARTERIAL BLOOD: CPT

## 2020-10-28 PROCEDURE — 25000003 PHARM REV CODE 250: Performed by: INTERNAL MEDICINE

## 2020-10-28 PROCEDURE — 20000000 HC ICU ROOM

## 2020-10-28 PROCEDURE — 99233 PR SUBSEQUENT HOSPITAL CARE,LEVL III: ICD-10-PCS | Mod: ,,, | Performed by: COLON & RECTAL SURGERY

## 2020-10-28 PROCEDURE — 63600175 PHARM REV CODE 636 W HCPCS: Performed by: NURSE PRACTITIONER

## 2020-10-28 PROCEDURE — 82803 BLOOD GASES ANY COMBINATION: CPT

## 2020-10-28 PROCEDURE — 80053 COMPREHEN METABOLIC PANEL: CPT

## 2020-10-28 PROCEDURE — 99900035 HC TECH TIME PER 15 MIN (STAT)

## 2020-10-28 PROCEDURE — 25000242 PHARM REV CODE 250 ALT 637 W/ HCPCS: Performed by: NURSE PRACTITIONER

## 2020-10-28 PROCEDURE — 80202 ASSAY OF VANCOMYCIN: CPT

## 2020-10-28 PROCEDURE — 83735 ASSAY OF MAGNESIUM: CPT

## 2020-10-28 PROCEDURE — 99233 PR SUBSEQUENT HOSPITAL CARE,LEVL III: ICD-10-PCS | Mod: ,,, | Performed by: NURSE PRACTITIONER

## 2020-10-28 PROCEDURE — 25000003 PHARM REV CODE 250: Performed by: NURSE PRACTITIONER

## 2020-10-28 PROCEDURE — 84100 ASSAY OF PHOSPHORUS: CPT

## 2020-10-28 PROCEDURE — 80202 ASSAY OF VANCOMYCIN: CPT | Mod: 91

## 2020-10-28 PROCEDURE — 99233 SBSQ HOSP IP/OBS HIGH 50: CPT | Mod: ,,, | Performed by: NURSE PRACTITIONER

## 2020-10-28 PROCEDURE — 94640 AIRWAY INHALATION TREATMENT: CPT

## 2020-10-28 PROCEDURE — 85025 COMPLETE CBC W/AUTO DIFF WBC: CPT

## 2020-10-28 RX ORDER — CHOLESTYRAMINE 4 G/9G
1 POWDER, FOR SUSPENSION ORAL 2 TIMES DAILY
Status: DISCONTINUED | OUTPATIENT
Start: 2020-10-28 | End: 2020-11-01

## 2020-10-28 RX ORDER — METRONIDAZOLE 500 MG/1
500 TABLET ORAL EVERY 8 HOURS
Status: DISCONTINUED | OUTPATIENT
Start: 2020-10-28 | End: 2020-11-03 | Stop reason: HOSPADM

## 2020-10-28 RX ORDER — POTASSIUM CHLORIDE 1.5 G/1.58G
20 POWDER, FOR SOLUTION ORAL ONCE
Status: COMPLETED | OUTPATIENT
Start: 2020-10-28 | End: 2020-10-28

## 2020-10-28 RX ADMIN — PROPRANOLOL HYDROCHLORIDE 30 MG: 20 SOLUTION ORAL at 09:10

## 2020-10-28 RX ADMIN — PHENOBARBITAL 100 MG: 20 ELIXIR ORAL at 09:10

## 2020-10-28 RX ADMIN — MORPHINE SULFATE 2 MG: 2 INJECTION, SOLUTION INTRAMUSCULAR; INTRAVENOUS at 03:10

## 2020-10-28 RX ADMIN — MICONAZOLE NITRATE: 2 OINTMENT TOPICAL at 09:10

## 2020-10-28 RX ADMIN — SODIUM CHLORIDE 30 MG/ML INHALATION SOLUTION 4 ML: 30 SOLUTION INHALANT at 07:10

## 2020-10-28 RX ADMIN — CHLORHEXIDINE GLUCONATE 0.12% ORAL RINSE 15 ML: 1.2 LIQUID ORAL at 09:10

## 2020-10-28 RX ADMIN — FAMOTIDINE 20 MG: 10 INJECTION INTRAVENOUS at 09:10

## 2020-10-28 RX ADMIN — CHOLESTYRAMINE 4 G: 4 POWDER, FOR SUSPENSION ORAL at 09:10

## 2020-10-28 RX ADMIN — SODIUM CHLORIDE 30 MG/ML INHALATION SOLUTION 4 ML: 30 SOLUTION INHALANT at 03:10

## 2020-10-28 RX ADMIN — CEFEPIME HYDROCHLORIDE 2 G: 2 INJECTION, SOLUTION INTRAVENOUS at 12:10

## 2020-10-28 RX ADMIN — FLUCONAZOLE 100 MG: 40 POWDER, FOR SUSPENSION ORAL at 09:10

## 2020-10-28 RX ADMIN — METRONIDAZOLE 500 MG: 500 TABLET ORAL at 09:10

## 2020-10-28 RX ADMIN — CEFEPIME HYDROCHLORIDE 2 G: 2 INJECTION, SOLUTION INTRAVENOUS at 04:10

## 2020-10-28 RX ADMIN — VANCOMYCIN HYDROCHLORIDE 500 MG: 500 INJECTION, POWDER, LYOPHILIZED, FOR SOLUTION INTRAVENOUS at 08:10

## 2020-10-28 RX ADMIN — BACLOFEN 10 MG: 10 TABLET ORAL at 09:10

## 2020-10-28 RX ADMIN — POTASSIUM CHLORIDE 20 MEQ: 1.5 POWDER, FOR SOLUTION ORAL at 08:10

## 2020-10-28 RX ADMIN — BACLOFEN 10 MG: 10 TABLET ORAL at 02:10

## 2020-10-28 RX ADMIN — METRONIDAZOLE 500 MG: 500 TABLET ORAL at 02:10

## 2020-10-28 RX ADMIN — CEFEPIME HYDROCHLORIDE 2 G: 2 INJECTION, SOLUTION INTRAVENOUS at 08:10

## 2020-10-28 RX ADMIN — SODIUM CHLORIDE 30 MG/ML INHALATION SOLUTION 4 ML: 30 SOLUTION INHALANT at 11:10

## 2020-10-28 RX ADMIN — VANCOMYCIN HYDROCHLORIDE 500 MG: 500 INJECTION, POWDER, LYOPHILIZED, FOR SOLUTION INTRAVENOUS at 10:10

## 2020-10-28 RX ADMIN — GABAPENTIN 250 MG: 250 SUSPENSION ORAL at 09:10

## 2020-10-28 RX ADMIN — ENOXAPARIN SODIUM 30 MG: 100 INJECTION SUBCUTANEOUS at 04:10

## 2020-10-28 RX ADMIN — PROPRANOLOL HYDROCHLORIDE 30 MG: 20 SOLUTION ORAL at 02:10

## 2020-10-28 NOTE — SUBJECTIVE & OBJECTIVE
Review of Systems   Unable to perform ROS: Patient nonverbal     Objective:     Vital Signs (Most Recent):  Temp: 99.1 °F (37.3 °C) (10/27/20 1905)  Pulse: (!) 118 (10/28/20 0941)  Resp: (!) 25 (10/28/20 0834)  BP: 120/81 (10/28/20 0941)  SpO2: 100 % (10/28/20 0834) Vital Signs (24h Range):  Temp:  [99 °F (37.2 °C)-99.1 °F (37.3 °C)] 99.1 °F (37.3 °C)  Pulse:  [] 118  Resp:  [8-42] 25  SpO2:  [100 %] 100 %  BP: ()/(59-92) 120/81     Weight: 34.3 kg (75 lb 9.9 oz)  Body mass index is 16.36 kg/m².      Intake/Output Summary (Last 24 hours) at 10/28/2020 1116  Last data filed at 10/28/2020 0600  Gross per 24 hour   Intake 1475 ml   Output 1175 ml   Net 300 ml         Physical Exam  Vitals signs and nursing note reviewed.   Constitutional:       General: He is awake.      Appearance: He is underweight. He is ill-appearing.      Comments: Bilateral mittens in place   HENT:      Head: Atraumatic.      Mouth/Throat:      Mouth: Mucous membranes are moist.   Eyes:      Conjunctiva/sclera: Conjunctivae normal.      Pupils: Pupils are equal, round, and reactive to light.   Neck:      Musculoskeletal: No edema.      Vascular: No JVD.      Comments: Severe bilateral upper and lower extremity contractures  Cardiovascular:      Rate and Rhythm: Regular rhythm. Tachycardia present.      Pulses:           Radial pulses are 2+ on the right side and 2+ on the left side.        Dorsalis pedis pulses are 1+ on the right side and 1+ on the left side.   Pulmonary:      Breath sounds: Decreased air movement present. Decreased breath sounds present. No wheezing, rhonchi or rales.   Abdominal:      General: Abdomen is protuberant. Bowel sounds are normal. There is no distension.      Palpations: Abdomen is soft.      Tenderness: There is no abdominal tenderness.      Comments: Scarring around button PEG   Musculoskeletal:      Right lower leg: No edema.      Left lower leg: No edema.   Skin:     General: Skin is warm and dry.       Capillary Refill: Capillary refill takes 2 to 3 seconds.          Neurological:      Mental Status: He is alert.      GCS: GCS eye subscore is 4. GCS verbal subscore is 1. GCS motor subscore is 4.           Lines/Drains/Airways     Peripherally Inserted Central Catheter Line            PICC Double Lumen 10/22/20 1300 5 days          Drain                 Gastrostomy/Enterostomy 08/04/20 1653 Percutaneous endoscopic gastrostomy (PEG) feeding 84 days         Gastrostomy/Enterostomy 10/22/20 1300 LUQ 5 days         Urethral Catheter 10/22/20 1653 Latex 16 Fr. 5 days         Rectal Tube 10/25/20 1400 rectal tube w/ balloon (indicate number of mLs) 2 days          Airway                 Surgical Airway 10/27/20 0855 Shiley Cuffed 1 day                Significant Labs:    CBC/Anemia Profile:  Recent Labs   Lab 10/27/20  0430 10/28/20  0434   WBC 10.67 9.29   HGB 9.6* 9.1*   HCT 31.4* 29.1*   * 489*   MCV 93 93   RDW 13.8 14.2        Chemistries:  Recent Labs   Lab 10/27/20  0430 10/28/20  0434    137   K 4.0 3.9    104   CO2 27 25   BUN 7 12   CREATININE 0.5 0.6   CALCIUM 9.0 8.5*   ALBUMIN 2.1* 2.0*   PROT 7.0 6.7   BILITOT 0.1 0.1   ALKPHOS 108 162*   ALT 15 31   AST 18 33   MG 2.1 2.0   PHOS 4.0 4.4       All pertinent labs within the past 24 hours have been reviewed.    Significant Imaging:  I have reviewed all pertinent imaging results/findings within the past 24 hours.

## 2020-10-28 NOTE — SUBJECTIVE & OBJECTIVE
No current facility-administered medications on file prior to encounter.      Current Outpatient Medications on File Prior to Encounter   Medication Sig    arginine-glutamine-calcium HMB (CHEO) 7-7-1.5 gram PwPk Take by mouth 2 (two) times a day.    ascorbic acid, vitamin C, (VITAMIN C) 500 MG tablet 1 tablet (500 mg total) by Per G Tube route once daily.    baclofen (LIORESAL) 10 MG tablet 1 tablet (10 mg total) by Per G Tube route 3 (three) times daily.    lactobacillus acidophilus & bulgar (LACTINEX) 100 million cell packet Take 1 tablet by mouth once daily.    multivitamin liquid no.118 Liqd 10 mLs by Per G Tube route once daily.    propranoloL (INDERAL) 20 MG tablet 1 tablet (20 mg total) by Per G Tube route 3 (three) times daily. (Patient taking differently: 20 mg by Per G Tube route 4 (four) times daily. )    acetaminophen (TYLENOL) 325 MG tablet Take 325 mg by mouth every 4 (four) hours as needed for Pain.    bisacodyL (DULCOLAX) 10 mg Supp Place 1 suppository (10 mg total) rectally daily as needed (Until bowel movement if patient has no bowel movement for 2 days).    diphenhydrAMINE (BENADRYL) 12.5 mg/5 mL elixir 10 mLs (25 mg total) by Per G Tube route 4 (four) times daily as needed for Allergies.    gabapentin (NEURONTIN) 250 mg/5 mL solution 5 mLs (250 mg total) by Per G Tube route nightly. At bedtime    melatonin (MELATIN) 3 mg tablet 2 tablets (6 mg total) by Per G Tube route nightly as needed for Insomnia.    PHENobarbitaL 20 mg/5 mL (4 mg/mL) Elix elixir 25 mLs (100 mg total) by Per G Tube route every evening.       Review of patient's allergies indicates:  No Known Allergies    Past Medical History:   Diagnosis Date    Acid reflux     Anemia of chronic disease 8/30/2020    Cerebral palsy     Colitis     Decubitus ulcer of ischial area, left, stage IV 9/10/2020    History of hip surgery     Osteomyelitis of pelvis 9/24/2020    Pneumoperitoneum 9/21/2020    Pressure injury of  right ischium, stage 4     S/P percutaneous endoscopic gastrostomy (PEG) tube placement     Seizure disorder 8/26/2020    Seizures     Severe protein-calorie malnutrition     Sinus tachycardia 8/4/2020     Past Surgical History:   Procedure Laterality Date    CHOLECYSTECTOMY      FLEXIBLE SIGMOIDOSCOPY N/A 9/9/2020    Procedure: SIGMOIDOSCOPY, FLEXIBLE;  Surgeon: America Goode MD;  Location: [address]);  Service: Endoscopy;  Laterality: N/A;    HIP SURGERY      TRACHEOSTOMY N/A 10/27/2020    Procedure: CREATION, TRACHEOSTOMY;  Surgeon: Kar Palma MD;  Location: [facility];  Service: ENT;  Laterality: N/A;     Family History     Problem Relation (Age of Onset)    Cancer Maternal Grandfather        Tobacco Use    Smoking status: Never Smoker    Smokeless tobacco: Never Used   Substance and Sexual Activity    Alcohol use: Never     Frequency: Never    Drug use: Not Currently    Sexual activity: Not on file     Review of Systems   Unable to perform ROS: Patient nonverbal     Objective:     Vital Signs (Most Recent):  Temp: 99.1 °F (37.3 °C) (10/27/20 1905)  Pulse: 106 (10/28/20 1412)  Resp: (!) 30 (10/28/20 1339)  BP: 121/78 (10/28/20 1412)  SpO2: 100 % (10/28/20 1339) Vital Signs (24h Range):  Temp:  [99 °F (37.2 °C)-99.1 °F (37.3 °C)] 99.1 °F (37.3 °C)  Pulse:  [] 106  Resp:  [8-30] 30  SpO2:  [100 %] 100 %  BP: ()/(59-92) 121/78     Weight: 34.3 kg (75 lb 9.9 oz)  Body mass index is 16.36 kg/m².    Physical Exam  Constitutional:       Appearance: He is well-developed.   HENT:      Head: Atraumatic.   Eyes:      Conjunctiva/sclera: Conjunctivae normal.   Neck:      Thyroid: No thyromegaly.   Cardiovascular:      Rate and Rhythm: Regular rhythm.   Pulmonary:      Comments: Trach in place  Abdominal:      General: There is no distension.      Palpations: Abdomen is soft. There is no mass.      Comments: Gtube in place in LUQ; well-healed transverse incision in Left-mid abdomen    Musculoskeletal:         General: No tenderness.   Skin:     General: Skin is warm and dry.      Capillary Refill: Capillary refill takes less than 2 seconds.      Comments: +bilateral decubitus posterior ulcers with wound vacs in place with good seal/sxn   Neurological:      Mental Status: He is alert. Mental status is at baseline.         Significant Labs:  CBC:   Recent Labs   Lab 10/28/20  0434   WBC 9.29   RBC 3.13*   HGB 9.1*   HCT 29.1*   *   MCV 93   MCH 29.1   MCHC 31.3*     BMP:   Recent Labs   Lab 10/28/20  0434   GLU 95      K 3.9      CO2 25   BUN 12   CREATININE 0.6   CALCIUM 8.5*   MG 2.0     CMP:   Recent Labs   Lab 10/28/20  0434   GLU 95   CALCIUM 8.5*   ALBUMIN 2.0*   PROT 6.7      K 3.9   CO2 25      BUN 12   CREATININE 0.6   ALKPHOS 162*   ALT 31   AST 33   BILITOT 0.1     LFTs:   Recent Labs   Lab 10/28/20  0434   ALT 31   AST 33   ALKPHOS 162*   BILITOT 0.1   PROT 6.7   ALBUMIN 2.0*       CT:  FINDINGS:  Thoracic soft tissues: No significant abnormality.     Aorta: Normal in course and caliber, without significant atherosclerotic plaque.     Heart: Normal in size. No pericardial effusion.     Nadeen/Mediastinum: No significant lymphadenopathy     Lungs: There is consolidation in the right lower lobe with air bronchograms concerning for pneumonia.  Aspiration could appear similarly.  Additional few scattered areas of consolidation in the right lung.     Liver: Normal in size and attenuation, with no focal hepatic lesions.     Gallbladder: Gallbladder surgically absent.     Bile Ducts: No evidence of dilated ducts.     Pancreas: No mass or peripancreatic fat stranding.     Spleen: Unremarkable.     Adrenals: Unremarkable.     Kidneys/ Ureters: Normal in size and location.  No hydronephrosis or nephrolithiasis. No ureteral dilatation.     Bladder: Iglesias catheter in place.     Reproductive organs: Unremarkable.     GI Tract/Mesentery: Gastrostomy tube in place.   There is a moderate stool burden through the colon.  Predominantly liquid stool.  This represent diarrheal illness.     Peritoneal Space: No ascites. No free air.     Retroperitoneum:  No significant adenopathy.     Abdominal wall and bones: There is a large decubitus ulcer right greater than left.  The right ulcer tracks to the posterior aspect of the right inferior pubic ramus.  Osteomyelitis cannot be excluded.  Deformities of the bilateral hips possibly congenital or related to prior infection.     Vasculature: No significant atherosclerosis or aneurysm.     Impression:     Right lower lobe probable pneumonia.  Osseous erosion could appear similarly but is less favored.     Additional scattered areas of consolidation in the right lung likely also related to infection.     There is a large decubitus ulcer right greater than left. The right ulcer tracks to the posterior aspect of the right inferior pubic ramus. Osteomyelitis cannot be excluded.     Other incidental findings as above.

## 2020-10-28 NOTE — RESPIRATORY THERAPY
Patient remains on T-Piece Per Vernell Sidhu NP.  No distress and saturations remain at 100%.  Ventilator still on standby at bedside

## 2020-10-28 NOTE — PROGRESS NOTES
10/28/20 0905   Skin   Skin WDL ex   Skin Color/Characteristics without discoloration   Skin Temperature warm   Skin Moisture dry   Skin Integrity wound;rash   Specialty Bed/Overlay Low air loss   Bed Support Surface Assessed   Jerry Risk Assessment   Sensory Perception 3-->slightly limited   Moisture 2-->very moist   Activity 1-->bedfast   Mobility 2-->very limited   Nutrition 2-->probably inadequate   Friction and Shear 1-->problem   Jerry Score 11        Negative Pressure Wound Therapy  10/26/20    Placement Date: 10/26/20   Side: (c)   Location: Ischial tuberosity   NPWT Type Vacuum Therapy   Therapy Setting NPWT Continuous therapy   Pressure Setting NPWT 125 mmHg   Therapy Interventions NPWT Y connector   Sponges Inserted NPWT Black;2   Sponges Removed NPWT Black;2        Altered Skin Integrity 10/23/20 Right Ischial tuberosity Full thickness tissue loss with exposed bone, tendon, or muscle. Often includes undermining and tunneling. May extend into muscle and/or supporting structures.   Date First Assessed: 10/23/20   Altered Skin Integrity Present on Admission: yes  Side: Right  Location: Ischial tuberosity  Description of Altered Skin Integrity: Full thickness tissue loss with exposed bone, tendon, or muscle. Often includes undermi...   Wound Image    Description of Altered Skin Integrity Full thickness tissue loss with exposed bone, tendon, or muscle. Often includes undermining and tunneling. May extend into muscle and/or supporting structures.   Dressing Appearance Intact   Drainage Amount Moderate   Drainage Characteristics/Odor Serosanguineous   Appearance Red;Muscle;Granulating   Tissue loss description Full thickness   Periwound Area Redness   Wound Edges Open   Wound Length (cm) 5 cm   Wound Width (cm) 7 cm   Wound Depth (cm) 3 cm   Wound Volume (cm^3) 105 cm^3   Wound Surface Area (cm^2) 35 cm^2   Undermining (depth (cm)/location) 4cm from 9-1 o'clock   Care Cleansed with:;Wound  "cleanser;Applied:;Skin Barrier   Dressing Changed   Dressing Change Due 10/30/20        Altered Skin Integrity 10/23/20 Left Ischial tuberosity Full thickness tissue loss with exposed bone, tendon, or muscle. Often includes undermining and tunneling. May extend into muscle and/or supporting structures.   Date First Assessed: 10/23/20   Altered Skin Integrity Present on Admission: yes  Side: Left  Location: Ischial tuberosity  Description of Altered Skin Integrity: Full thickness tissue loss with exposed bone, tendon, or muscle. Often includes undermin...   Wound Image    Description of Altered Skin Integrity Full thickness tissue loss with exposed bone, tendon, or muscle. Often includes undermining and tunneling. May extend into muscle and/or supporting structures.   Dressing Appearance Moist drainage   Drainage Amount Moderate   Drainage Characteristics/Odor Serosanguineous   Appearance Red;Muscle;Bone;Not granulating   Tissue loss description Full thickness   Periwound Area Redness   Wound Edges Open   Wound Length (cm) 5.5 cm   Wound Width (cm) 5 cm   Wound Depth (cm) 3 cm   Wound Volume (cm^3) 82.5 cm^3   Wound Surface Area (cm^2) 27.5 cm^2   Undermining (depth (cm)/location) 4cm from 9-3 o'clock   Care Cleansed with:;Wound cleanser;Applied:;Skin Barrier   Dressing Changed   Dressing Change Due 10/30/20     F/U visit with Mr. Moscoso for continued wound management. He remains awake and alert, now with trach noted. Intertrigo and MASD with yeast improving after systemic antifungals initiated. Wound VAC noted to be alarming "blockage" and "air leak". On assessment, right Ischium wound vac dressing in place with good seal, left ischium wound vac dressing lifted at edge near perineum and noted to contaminated with stool despite flexiseal. Patient positioned on left side, semi-prone with pillow support.   Right Ischium wound vac dressing removed. Wound measurements unchanged from prior assessment. Wound bed is moist, " red budding granulation tissue over muscle, bone remains palpable. Larger area of purple discoloration previously seen has resolved. Cleansed with wound cleanser spray and patted dry. Nayely wound skin sprayed with cavilon, stoma ring applied to edges. One piece black foam cut to size and applied to fill wound, and sealed with hydrocolloid and drape, patient then repositioned to right side semi-prone.  Left ischium wound vac dressing removed.  Wound measurements unchanged from prior assessment. Wound bed is moist, red budding granulation tissue over muscle, bone remains palpable. Cleansed with wound cleanser spray and patted dry. Nayely wound skin sprayed with cavilon, stoma ring applied to edges. One piece black foam cut to size and applied to fill wound, and sealed with hydrocolloid and drape, patient then repositioned supine with extremities supported. Both wound VAC dressings connected via Y-connector to I wound vac ulta at same settings: continuous -125 mmHg low intensity suction with good seal noted, no air leak.  Discussed with primary nurse Alexa, and Dr. Bobo. Again, recommend diverting colostomy as Flexiseal is not preventing fecal contamination of wounds. Will plan for next wound vac dressing change Friday, 10/30.

## 2020-10-28 NOTE — ANESTHESIA PREPROCEDURE EVALUATION
10/28/2020  Glen Mos[place]o is a 25 y.o., male.  Patient Active Problem List   Diagnosis    Cerebral palsy    Sinus tachycardia    Proctocolitis    Seizure disorder    Bacterial infection due to Serratia    Anemia of chronic disease    Decubitus ulcer of ischial area, left, stage IV    Pressure injury of right ischium, stage 4    Pneumoperitoneum    Severe protein-calorie malnutrition    Colitis    Osteomyelitis of pelvis, presumed     Tachycardia    Right lower lobe pneumonia    Status post tracheostomy     Current Facility-Administered Medications on File Prior to Visit   Medication Dose Route Frequency Provider Last Rate Last Dose    acetaminophen tablet 650 mg  650 mg Per G Tube Q4H PRN Hannah Ray NP   650 mg at 10/26/20 1920    albuterol-ipratropium 2.5 mg-0.5 mg/3 mL nebulizer solution 3 mL  3 mL Nebulization Q6H PRN Maikel Garcia MD   3 mL at 10/26/20 0731    baclofen tablet 10 mg  10 mg Per G Tube TID ERVIN PanchalP-BC   10 mg at 10/28/20 1412    bisacodyL suppository 10 mg  10 mg Rectal Daily PRN Hannah Ray NP        cefepime in dextrose 5 % IVPB 2 g  2 g Intravenous Q8H ERVIN PanchalP-BC   2 g at 10/28/20 0806    chlorhexidine 0.12 % solution 15 mL  15 mL Mouth/Throat BID ERVIN PanchalP-BC   15 mL at 10/28/20 0941    cholestyramine 4 gram packet 4 g  1 packet Per G Tube BID Travis Bobo MD   4 g at 10/28/20 0941    enoxaparin injection 30 mg  30 mg Subcutaneous Q24H ERVIN PanchalP-BC   30 mg at 10/27/20 1628    famotidine (PF) injection 20 mg  20 mg Intravenous BID ERVIN PanchalP-BC   20 mg at 10/28/20 0942    fluconazole 40 mg/ml suspension 100 mg  100 mg Per G Tube Daily ERVIN PanchalP-BC   100 mg at 10/28/20 0941    gabapentin 250 mg/5 mL (5 mL) solution 250 mg  250 mg Per G Tube QHS ERVIN PanchalP-BC   250 mg at 10/27/20  2054    influenza (QUADRIVALENT PF) vaccine 0.5 mL  0.5 mL Intramuscular vaccine x 1 dose DAMASO Panchal        metroNIDAZOLE tablet 500 mg  500 mg Per G Tube Q8H Travis Bobo MD   500 mg at 10/28/20 1412    miconazole nitrate 2% ointment   Topical (Top) BID Smitha Smith MD        morphine injection 2 mg  2 mg Intravenous Q4H PRN Kolton Block MD   2 mg at 10/28/20 0330    PHENobarbitaL elixir 100 mg  100 mg Per G Tube QHS Hannah Ray NP   100 mg at 10/27/20 2139    pneumoc 13-robby conj-dip cr(PF) (PREVNAR 13 (PF)) 0.5 mL  0.5 mL Intramuscular vaccine x 1 dose Maikel Garcia MD        propranoloL 20 mg/5 mL (4 mg/mL) solution 30 mg  30 mg Per G Tube TID DAMASO Panchal   30 mg at 10/28/20 1412    sodium chloride 3% nebulizer solution 4 mL  4 mL Nebulization Q8H DAMASO Panchal   4 mL at 10/28/20 1542    vancomycin - pharmacy to dose   Intravenous pharmacy to manage frequency Travis Bobo MD         Current Outpatient Medications on File Prior to Visit   Medication Sig Dispense Refill    acetaminophen (TYLENOL) 325 MG tablet Take 325 mg by mouth every 4 (four) hours as needed for Pain.      arginine-glutamine-calcium HMB (CHEO) 7-7-1.5 gram PwPk Take by mouth 2 (two) times a day.      ascorbic acid, vitamin C, (VITAMIN C) 500 MG tablet 1 tablet (500 mg total) by Per G Tube route once daily.      baclofen (LIORESAL) 10 MG tablet 1 tablet (10 mg total) by Per G Tube route 3 (three) times daily. 90 tablet 11    bisacodyL (DULCOLAX) 10 mg Supp Place 1 suppository (10 mg total) rectally daily as needed (Until bowel movement if patient has no bowel movement for 2 days).  0    diphenhydrAMINE (BENADRYL) 12.5 mg/5 mL elixir 10 mLs (25 mg total) by Per G Tube route 4 (four) times daily as needed for Allergies.  0    gabapentin (NEURONTIN) 250 mg/5 mL solution 5 mLs (250 mg total) by Per G Tube route nightly. At bedtime      lactobacillus acidophilus & bulgar  (LACTINEX) 100 million cell packet Take 1 tablet by mouth once daily.      melatonin (MELATIN) 3 mg tablet 2 tablets (6 mg total) by Per G Tube route nightly as needed for Insomnia.  0    multivitamin liquid no.118 Liqd 10 mLs by Per G Tube route once daily.      PHENobarbitaL 20 mg/5 mL (4 mg/mL) Elix elixir 25 mLs (100 mg total) by Per G Tube route every evening. 473 mL 0    propranoloL (INDERAL) 20 MG tablet 1 tablet (20 mg total) by Per G Tube route 3 (three) times daily. (Patient taking differently: 20 mg by Per G Tube route 4 (four) times daily. ) 90 tablet 11     Past Surgical History:   Procedure Laterality Date    CHOLECYSTECTOMY      FLEXIBLE SIGMOIDOSCOPY N/A 9/9/2020    Procedure: SIGMOIDOSCOPY, FLEXIBLE;  Surgeon: America Goode MD;  Location: [address]);  Service: Endoscopy;  Laterality: N/A;    HIP SURGERY      TRACHEOSTOMY N/A 10/27/2020    Procedure: CREATION, TRACHEOSTOMY;  Surgeon: Kar Palma MD;  Location: [facility];  Service: ENT;  Laterality: N/A;       Pre-op Assessment    I have reviewed the Patient Summary Reports.     I have reviewed the Nursing Notes.    I have reviewed the Medications.     Review of Systems  Anesthesia Hx:  No problems with previous Anesthesia  History of prior surgery of interest to airway management or planning: Previous anesthesia: General  Denies Personal Hx of Anesthesia complications.   Social:  Non-Smoker    Hematology/Oncology:     Oncology Normal    -- Anemia:   EENT/Dental:EENT/Dental Normal   Cardiovascular:  Cardiovascular Normal  ECG has been reviewed. Echo 10/2020  · The left ventricle is normal in size with low normal systolic function. The estimated ejection fraction is 50%.  · Normal left ventricular diastolic function.  · Normal right ventricular systolic function.  · Mild tricuspid regurgitation.  · The estimated PA systolic pressure is 26 mmHg.  · Normal central venous pressure (3 mmHg).  · Tachcardia   Pulmonary:  Pulmonary Normal     Renal/:  Renal/ Normal     Hepatic/GI:   GERD    Musculoskeletal:  Musculoskeletal Normal    Neurological:   Seizures  Movement Disorder Dx, Cerebral Palsy   Endocrine:  Endocrine Normal    Dermatological:  Skin Normal    Psych:  Psychiatric Normal           Chemistry        Component Value Date/Time     10/28/2020 0434    K 3.9 10/28/2020 0434     10/28/2020 0434    CO2 25 10/28/2020 0434    BUN 12 10/28/2020 0434    CREATININE 0.6 10/28/2020 0434    GLU 95 10/28/2020 0434        Component Value Date/Time    CALCIUM 8.5 (L) 10/28/2020 0434    ALKPHOS 162 (H) 10/28/2020 0434    AST 33 10/28/2020 0434    ALT 31 10/28/2020 0434    BILITOT 0.1 10/28/2020 0434    ESTGFRAFRICA >60 10/28/2020 0434    EGFRNONAA >60 10/28/2020 0434        Lab Results   Component Value Date    WBC 9.29 10/28/2020    HGB 9.1 (L) 10/28/2020    HCT 29.1 (L) 10/28/2020    MCV 93 10/28/2020     (H) 10/28/2020           Physical Exam  General:  Cachexia    Airway/Jaw/Neck:  Airway Findings: Pre-Existing Airway Tube(s): Tracheostomy tube      Chest/Lungs:  Chest/Lungs Clear    Heart/Vascular:  Heart Findings: Normal Heart murmur: negative       Mental Status:  Mental Status Findings: (Nonverbal)         Anesthesia Plan  Type of Anesthesia, risks & benefits discussed:  Anesthesia Type:  general  Patient's Preference:   Intra-op Monitoring Plan: standard ASA monitors  Intra-op Monitoring Plan Comments:   Post Op Pain Control Plan: multimodal analgesia  Post Op Pain Control Plan Comments:   Induction:   IV  Beta Blocker:  Patient is on a Beta-Blocker and has received one dose within the past 24 hours (No further documentation required).       Informed Consent: Patient understands risks and agrees with Anesthesia plan.  Questions answered. Anesthesia consent signed with patient.  ASA Score: 3     Day of Surgery Review of History & Physical: I have interviewed and examined the patient. I have reviewed the patient's H&P dated:     H&P update referred to the surgeon.         Ready For Surgery From Anesthesia Perspective.

## 2020-10-28 NOTE — SUBJECTIVE & OBJECTIVE
Past Medical History:   Diagnosis Date    Acid reflux     Anemia of chronic disease 2020    Cerebral palsy     Colitis     Decubitus ulcer of ischial area, left, stage IV 9/10/2020    History of hip surgery     Osteomyelitis of pelvis 2020    Pneumoperitoneum 2020    Pressure injury of right ischium, stage 4     S/P percutaneous endoscopic gastrostomy (PEG) tube placement     Seizure disorder 2020    Seizures     Severe protein-calorie malnutrition     Sinus tachycardia 2020       Past Surgical History:   Procedure Laterality Date    CHOLECYSTECTOMY      FLEXIBLE SIGMOIDOSCOPY N/A 2020    Procedure: SIGMOIDOSCOPY, FLEXIBLE;  Surgeon: America Goode MD;  Location: [address]);  Service: Endoscopy;  Laterality: N/A;    HIP SURGERY         Review of patient's allergies indicates:  No Known Allergies    Medications:  Medications Prior to Admission   Medication Sig    arginine-glutamine-calcium HMB (CHEO) 7-7-1.5 gram PwPk Take by mouth 2 (two) times a day.    ascorbic acid, vitamin C, (VITAMIN C) 500 MG tablet 1 tablet (500 mg total) by Per G Tube route once daily.    baclofen (LIORESAL) 10 MG tablet 1 tablet (10 mg total) by Per G Tube route 3 (three) times daily.    [] ceFEPIme (MAXIPIME) 2 gram injection Inject 2 g into the vein every 8 (eight) hours. End date 10/22/2020. for 13 days    lactobacillus acidophilus & bulgar (LACTINEX) 100 million cell packet Take 1 tablet by mouth once daily.    [] metroNIDAZOLE (FLAGYL) 500 MG tablet 1 tablet (500 mg total) by Per G Tube route 3 (three) times daily. End date 10/22/2020. for 13 days    multivitamin liquid no.118 Liqd 10 mLs by Per G Tube route once daily.    propranoloL (INDERAL) 20 MG tablet 1 tablet (20 mg total) by Per G Tube route 3 (three) times daily. (Patient taking differently: 20 mg by Per G Tube route 4 (four) times daily. )    [] vancomycin HCl (VANCOMYCIN 750 MG/250 ML D5W,  READY TO MIX SYSTEM,) Inject 250 mLs (750 mg total) into the vein every 12 (twelve) hours. End date 10/22/2020 for 13 days (Patient taking differently: Inject 1,000 mg into the vein every 8 (eight) hours. End date 10/22/2020)    acetaminophen (TYLENOL) 325 MG tablet Take 325 mg by mouth every 4 (four) hours as needed for Pain.    bisacodyL (DULCOLAX) 10 mg Supp Place 1 suppository (10 mg total) rectally daily as needed (Until bowel movement if patient has no bowel movement for 2 days).    diphenhydrAMINE (BENADRYL) 12.5 mg/5 mL elixir 10 mLs (25 mg total) by Per G Tube route 4 (four) times daily as needed for Allergies.    gabapentin (NEURONTIN) 250 mg/5 mL solution 5 mLs (250 mg total) by Per G Tube route nightly. At bedtime    melatonin (MELATIN) 3 mg tablet 2 tablets (6 mg total) by Per G Tube route nightly as needed for Insomnia.    PHENobarbitaL 20 mg/5 mL (4 mg/mL) Elix elixir 25 mLs (100 mg total) by Per G Tube route every evening.     Antibiotics (From admission, onward)    Start     Stop Route Frequency Ordered    10/28/20 0800  vancomycin 500 mg in dextrose 5 % 100 mL IVPB (ready to mix system)      -- IV Once 10/28/20 0546    10/28/20 0645  vancomycin - pharmacy to dose      -- IV pharmacy to manage frequency 10/28/20 0545    10/26/20 1600  cefepime in dextrose 5 % IVPB 2 g      -- IV Every 8 hours (non-standard times) 10/26/20 1211        Antifungals (From admission, onward)    Start     Stop Route Frequency Ordered    10/26/20 0900  fluconazole 40 mg/ml suspension 100 mg      -- PER G TUBE Daily 10/25/20 1320    10/23/20 2100  miconazole nitrate 2% ointment      -- Top 2 times daily 10/23/20 1246        Antivirals (From admission, onward)    None           There is no immunization history for the selected administration types on file for this patient.    Family History     Problem Relation (Age of Onset)    Cancer Maternal Grandfather        Social History     Socioeconomic History    Marital  status: Single     Spouse name: Not on file    Number of children: Not on file    Years of education: Not on file    Highest education level: Not on file   Occupational History    Not on file   Social Needs    Financial resource strain: Not on file    Food insecurity     Worry: Not on file     Inability: Not on file    Transportation needs     Medical: Not on file     Non-medical: Not on file   Tobacco Use    Smoking status: Never Smoker    Smokeless tobacco: Never Used   Substance and Sexual Activity    Alcohol use: Never     Frequency: Never    Drug use: Not Currently    Sexual activity: Not on file   Lifestyle    Physical activity     Days per week: Not on file     Minutes per session: Not on file    Stress: Not on file   Relationships    Social connections     Talks on phone: Not on file     Gets together: Not on file     Attends Yazidi service: Not on file     Active member of club or organization: Not on file     Attends meetings of clubs or organizations: Not on file     Relationship status: Not on file   Other Topics Concern    Not on file   Social History Narrative    Not on file     Review of Systems   Unable to perform ROS: Acuity of condition     Objective:     Vital Signs (Most Recent):  Temp: 99.1 °F (37.3 °C) (10/27/20 1905)  Pulse: 108 (10/28/20 0605)  Resp: 18 (10/28/20 0605)  BP: 117/77 (10/28/20 0605)  SpO2: 100 % (10/28/20 0605) Vital Signs (24h Range):  Temp:  [99 °F (37.2 °C)-99.1 °F (37.3 °C)] 99.1 °F (37.3 °C)  Pulse:  [] 108  Resp:  [8-42] 18  SpO2:  [100 %] 100 %  BP: ()/(59-92) 117/77     Weight: 34.3 kg (75 lb 9.9 oz)  Body mass index is 16.36 kg/m².    Estimated Creatinine Clearance: 92.9 mL/min (based on SCr of 0.6 mg/dL).    Physical Exam  Constitutional:       General: He is not in acute distress.     Appearance: He is well-developed. He is not diaphoretic.      Comments: Sedated on vent via trach   HENT:      Head: Normocephalic and atraumatic.       Mouth/Throat:      Pharynx: No oropharyngeal exudate.   Eyes:      Conjunctiva/sclera: Conjunctivae normal.      Pupils: Pupils are equal, round, and reactive to light.   Neck:      Musculoskeletal: Neck supple.      Thyroid: No thyromegaly.      Vascular: No JVD.      Comments: Trach in place   Cardiovascular:      Rate and Rhythm: Normal rate and regular rhythm.      Heart sounds: Normal heart sounds. No murmur.   Pulmonary:      Effort: No respiratory distress.      Breath sounds: No wheezing or rales.      Comments: On vent via trach . Breath sounds are coarse kathia   Chest:      Chest wall: No tenderness.   Abdominal:      General: Bowel sounds are normal. There is no distension.      Palpations: Abdomen is soft.      Tenderness: There is no abdominal tenderness. There is no guarding or rebound.      Comments: Wound vac over sacrum   Musculoskeletal:         General: Deformity (upper and lower ext ) present.   Lymphadenopathy:      Cervical: No cervical adenopathy.   Skin:     Comments: Wound vac dressings in place kathia ischium     Neurological:      Comments: Sedated on vent currently          Significant Labs:   Blood Culture:   Recent Labs   Lab 09/24/20  0533 10/03/20  1515 10/04/20  0133 10/22/20  1530 10/22/20  1545   LABBLOO No growth after 5 days.  No growth after 5 days. No growth after 5 days. No growth after 5 days. No growth after 5 days. No growth after 5 days.     BMP:   Recent Labs   Lab 10/28/20  0434   GLU 95      K 3.9      CO2 25   BUN 12   CREATININE 0.6   CALCIUM 8.5*   MG 2.0     CBC:   Recent Labs   Lab 10/27/20  0430 10/28/20  0434   WBC 10.67 9.29   HGB 9.6* 9.1*   HCT 31.4* 29.1*   * 489*     All pertinent labs within the past 24 hours have been reviewed.    Significant Imaging: I have reviewed all pertinent imaging results/findings within the past 24 hours.

## 2020-10-28 NOTE — PLAN OF CARE
Pt trached yesterday, site clean and dry. On vent SIMV setting overnight d/t shallow respirations not detected by vent, alarming apnea on spontaneous mode. ST. BP stable. Afebrile. Urine output  mls/hr. Stool continues to leak around fecal management system, causing stool to leak into buttocks decubitus wounds. Attempted multiple times to reposition pt, reinforce wound vac dressing, despite all attempts wound vac is not functioning properly. Will report to oncoming RN.

## 2020-10-28 NOTE — PLAN OF CARE
Assumed care of patient after completing bedside handoff.  Access checked, monitors and alarms checked.  Physical assessment completed, immediate patient needs addressed.  Patient switched to Trach collar this morning and remained on trach collar rest of shift.  Patient stooled into wound vac dressings.  Wound care to the bedside, dressings changed, stool observed in the wound bed.  Patient NPO at midnight for diverting colostomy 10/29/20.

## 2020-10-28 NOTE — CONSULTS
[facility]  Infectious Disease  Consult Note    Patient Name: Glen Moscoso  MRN: [id number]  Admission Date: 10/22/2020  Hospital Length of Stay: 6 days  Attending Physician: Travis Bobo MD  Primary Care Provider: Yadi Lawson MD     Isolation Status: No active isolations    Patient information was obtained from past medical records and ER records.      Consults  Assessment/Plan:     On mechanically assisted ventilation  Will wean as tolerated     Osteomyelitis of pelvis  Will continue wound care , will need 6 weeks of therapy , will check  ESR , CRP while on therapy     Decubitus ulcer of ischial area, left, stage IV  Will treat for presumed osteomyelitis , might need diverting colostomy -discuss with primary team ,   Will plan to treat with Vancomycin, Cefepime and Flagyl for 6 weeks -EOC -12/09/2020    Seizure disorder  Will follow neurology      Cerebral palsy  Will continue supportive care         Thank you for your consult. I will follow-up with patient. Please contact us if you have any additional questions.    Kar Morales MD  Infectious Disease  [facility]    Subjective:     Principal Problem: Right lower lobe pneumonia    HPI:  25 year old man with history  cerebral palsy who presents to the Emergency Department for evaluation of respiratory distress which onset PTA. EMS reports pt was found face down on the pillow in nursing home. Patient was discharged on 10/10/20 to [facility] on IV Vancomycin, Cefepime and Flagyl to treat pelvic osteomyelitis and resolving colitis.   Since admission , all cultures and imaging tetss has been reviewed -   Resp culture- 10/03-,09/06.  Serratia   08/26- Serratia/pseudomonas   CT scan -abdomen /pelvis -   Right lower lobe probable pneumonia.  Osseous erosion could appear similarly but is less favored.   Additional scattered areas of consolidation in the right lung likely also related to infection.   There is a large decubitus ulcer  right greater than left. The right ulcer tracks to the posterior aspect of the right inferior pubic ramus. Osteomyelitis cannot be excluded    Past Medical History:   Diagnosis Date    Acid reflux     Anemia of chronic disease 2020    Cerebral palsy     Colitis     Decubitus ulcer of ischial area, left, stage IV 9/10/2020    History of hip surgery     Osteomyelitis of pelvis 2020    Pneumoperitoneum 2020    Pressure injury of right ischium, stage 4     S/P percutaneous endoscopic gastrostomy (PEG) tube placement     Seizure disorder 2020    Seizures     Severe protein-calorie malnutrition     Sinus tachycardia 2020       Past Surgical History:   Procedure Laterality Date    CHOLECYSTECTOMY      FLEXIBLE SIGMOIDOSCOPY N/A 2020    Procedure: SIGMOIDOSCOPY, FLEXIBLE;  Surgeon: America Goode MD;  Location: [address]);  Service: Endoscopy;  Laterality: N/A;    HIP SURGERY         Review of patient's allergies indicates:  No Known Allergies    Medications:  Medications Prior to Admission   Medication Sig    arginine-glutamine-calcium HMB (CHEO) 7-7-1.5 gram PwPk Take by mouth 2 (two) times a day.    ascorbic acid, vitamin C, (VITAMIN C) 500 MG tablet 1 tablet (500 mg total) by Per G Tube route once daily.    baclofen (LIORESAL) 10 MG tablet 1 tablet (10 mg total) by Per G Tube route 3 (three) times daily.    [] ceFEPIme (MAXIPIME) 2 gram injection Inject 2 g into the vein every 8 (eight) hours. End date 10/22/2020. for 13 days    lactobacillus acidophilus & bulgar (LACTINEX) 100 million cell packet Take 1 tablet by mouth once daily.    [] metroNIDAZOLE (FLAGYL) 500 MG tablet 1 tablet (500 mg total) by Per G Tube route 3 (three) times daily. End date 10/22/2020. for 13 days    multivitamin liquid no.118 Liqd 10 mLs by Per G Tube route once daily.    propranoloL (INDERAL) 20 MG tablet 1 tablet (20 mg total) by Per G Tube route 3 (three) times  daily. (Patient taking differently: 20 mg by Per G Tube route 4 (four) times daily. )    [] vancomycin HCl (VANCOMYCIN 750 MG/250 ML D5W, READY TO MIX SYSTEM,) Inject 250 mLs (750 mg total) into the vein every 12 (twelve) hours. End date 10/22/2020 for 13 days (Patient taking differently: Inject 1,000 mg into the vein every 8 (eight) hours. End date 10/22/2020)    acetaminophen (TYLENOL) 325 MG tablet Take 325 mg by mouth every 4 (four) hours as needed for Pain.    bisacodyL (DULCOLAX) 10 mg Supp Place 1 suppository (10 mg total) rectally daily as needed (Until bowel movement if patient has no bowel movement for 2 days).    diphenhydrAMINE (BENADRYL) 12.5 mg/5 mL elixir 10 mLs (25 mg total) by Per G Tube route 4 (four) times daily as needed for Allergies.    gabapentin (NEURONTIN) 250 mg/5 mL solution 5 mLs (250 mg total) by Per G Tube route nightly. At bedtime    melatonin (MELATIN) 3 mg tablet 2 tablets (6 mg total) by Per G Tube route nightly as needed for Insomnia.    PHENobarbitaL 20 mg/5 mL (4 mg/mL) Elix elixir 25 mLs (100 mg total) by Per G Tube route every evening.     Antibiotics (From admission, onward)    Start     Stop Route Frequency Ordered    10/28/20 0800  vancomycin 500 mg in dextrose 5 % 100 mL IVPB (ready to mix system)      -- IV Once 10/28/20 0546    10/28/20 0645  vancomycin - pharmacy to dose      -- IV pharmacy to manage frequency 10/28/20 0545    10/26/20 1600  cefepime in dextrose 5 % IVPB 2 g      -- IV Every 8 hours (non-standard times) 10/26/20 1211        Antifungals (From admission, onward)    Start     Stop Route Frequency Ordered    10/26/20 0900  fluconazole 40 mg/ml suspension 100 mg      -- PER G TUBE Daily 10/25/20 1320    10/23/20 2100  miconazole nitrate 2% ointment      -- Top 2 times daily 10/23/20 1246        Antivirals (From admission, onward)    None           There is no immunization history for the selected administration types on file for this  patient.    Family History     Problem Relation (Age of Onset)    Cancer Maternal Grandfather        Social History     Socioeconomic History    Marital status: Single     Spouse name: Not on file    Number of children: Not on file    Years of education: Not on file    Highest education level: Not on file   Occupational History    Not on file   Social Needs    Financial resource strain: Not on file    Food insecurity     Worry: Not on file     Inability: Not on file    Transportation needs     Medical: Not on file     Non-medical: Not on file   Tobacco Use    Smoking status: Never Smoker    Smokeless tobacco: Never Used   Substance and Sexual Activity    Alcohol use: Never     Frequency: Never    Drug use: Not Currently    Sexual activity: Not on file   Lifestyle    Physical activity     Days per week: Not on file     Minutes per session: Not on file    Stress: Not on file   Relationships    Social connections     Talks on phone: Not on file     Gets together: Not on file     Attends [employer] witness service: Not on file     Active member of club or organization: Not on file     Attends meetings of clubs or organizations: Not on file     Relationship status: Not on file   Other Topics Concern    Not on file   Social History Narrative    Not on file     Review of Systems   Unable to perform ROS: Acuity of condition     Objective:     Vital Signs (Most Recent):  Temp: 99.1 °F (37.3 °C) (10/27/20 1905)  Pulse: 108 (10/28/20 0605)  Resp: 18 (10/28/20 0605)  BP: 117/77 (10/28/20 0605)  SpO2: 100 % (10/28/20 0605) Vital Signs (24h Range):  Temp:  [99 °F (37.2 °C)-99.1 °F (37.3 °C)] 99.1 °F (37.3 °C)  Pulse:  [] 108  Resp:  [8-42] 18  SpO2:  [100 %] 100 %  BP: ()/(59-92) 117/77     Weight: 34.3 kg (75 lb 9.9 oz)  Body mass index is 16.36 kg/m².    Estimated Creatinine Clearance: 92.9 mL/min (based on SCr of 0.6 mg/dL).    Physical Exam  Constitutional:       General: He is not in acute distress.      Appearance: He is well-developed. He is not diaphoretic.      Comments: Sedated on vent via trach   HENT:      Head: Normocephalic and atraumatic.      Mouth/Throat:      Pharynx: No oropharyngeal exudate.   Eyes:      Conjunctiva/sclera: Conjunctivae normal.      Pupils: Pupils are equal, round, and reactive to light.   Neck:      Musculoskeletal: Neck supple.      Thyroid: No thyromegaly.      Vascular: No JVD.      Comments: Trach in place   Cardiovascular:      Rate and Rhythm: Normal rate and regular rhythm.      Heart sounds: Normal heart sounds. No murmur.   Pulmonary:      Effort: No respiratory distress.      Breath sounds: No wheezing or rales.      Comments: On vent via trach . Breath sounds are coarse kathia   Chest:      Chest wall: No tenderness.   Abdominal:      General: Bowel sounds are normal. There is no distension.      Palpations: Abdomen is soft.      Tenderness: There is no abdominal tenderness. There is no guarding or rebound.      Comments: Wound vac over sacrum   Musculoskeletal:         General: Deformity (upper and lower ext ) present.   Lymphadenopathy:      Cervical: No cervical adenopathy.   Skin:     Comments: Wound vac dressings in place kathia ischium     Neurological:      Comments: Sedated on vent currently          Significant Labs:   Blood Culture:   Recent Labs   Lab 09/24/20  0533 10/03/20  1515 10/04/20  0133 10/22/20  1530 10/22/20  1545   LABBLOO No growth after 5 days.  No growth after 5 days. No growth after 5 days. No growth after 5 days. No growth after 5 days. No growth after 5 days.     BMP:   Recent Labs   Lab 10/28/20  0434   GLU 95      K 3.9      CO2 25   BUN 12   CREATININE 0.6   CALCIUM 8.5*   MG 2.0     CBC:   Recent Labs   Lab 10/27/20  0430 10/28/20  0434   WBC 10.67 9.29   HGB 9.6* 9.1*   HCT 31.4* 29.1*   * 489*     All pertinent labs within the past 24 hours have been reviewed.    Significant Imaging: I have reviewed all pertinent imaging  results/findings within the past 24 hours.

## 2020-10-28 NOTE — CONSULTS
[facility]   Colorectal Surgery  Consult Note    Patient Name: Glen Moscoso  MRN: [id number]  Code Status: Full Code  Admission Date: 10/22/2020  Hospital Length of Stay: 6 days  Attending Physician: Travis Bobo MD  Primary Care Provider: Yadi Lawson MD    Patient information was obtained from parent, past medical records and ER records.     Inpatient consult to Colorectal Surgery  Consult performed by: Michael Ortez MD  Consult ordered by: Travis Bobo MD        Subjective:     Principal Problem: Right lower lobe pneumonia/sacral decub ulcers    History of Present Illness:   25-year-old male with a history of cerebral palsy who is admitted to the ICU with respiratory failure and pneumonia.  Patient has had known bilateral decubitus ulcers that have been difficult to heal with osteomyelitis.  Patient has had continued loose stools and difficulty the diverting the stool away from the wounds even with wound VAC therapy.  During this admission he has also undergone a tracheostomy by ENT.  Colorectal surgery is consulted for evaluation for possible diverting colostomy given the difficulty with diarrhea and wound contamination from stool as the wounds are so close to his anus.  Per his mother who is his primary caregiver and surrogate decision maker, the patient has had a feeding tube placed and his abdomen.  Patient is nonverbal.    No current facility-administered medications on file prior to encounter.      Current Outpatient Medications on File Prior to Encounter   Medication Sig    arginine-glutamine-calcium HMB (CHEO) 7-7-1.5 gram PwPk Take by mouth 2 (two) times a day.    ascorbic acid, vitamin C, (VITAMIN C) 500 MG tablet 1 tablet (500 mg total) by Per G Tube route once daily.    baclofen (LIORESAL) 10 MG tablet 1 tablet (10 mg total) by Per G Tube route 3 (three) times daily.    lactobacillus acidophilus & bulgar (LACTINEX) 100 million cell packet Take 1 tablet by mouth once  daily.    multivitamin liquid no.118 Liqd 10 mLs by Per G Tube route once daily.    propranoloL (INDERAL) 20 MG tablet 1 tablet (20 mg total) by Per G Tube route 3 (three) times daily. (Patient taking differently: 20 mg by Per G Tube route 4 (four) times daily. )    acetaminophen (TYLENOL) 325 MG tablet Take 325 mg by mouth every 4 (four) hours as needed for Pain.    bisacodyL (DULCOLAX) 10 mg Supp Place 1 suppository (10 mg total) rectally daily as needed (Until bowel movement if patient has no bowel movement for 2 days).    diphenhydrAMINE (BENADRYL) 12.5 mg/5 mL elixir 10 mLs (25 mg total) by Per G Tube route 4 (four) times daily as needed for Allergies.    gabapentin (NEURONTIN) 250 mg/5 mL solution 5 mLs (250 mg total) by Per G Tube route nightly. At bedtime    melatonin (MELATIN) 3 mg tablet 2 tablets (6 mg total) by Per G Tube route nightly as needed for Insomnia.    PHENobarbitaL 20 mg/5 mL (4 mg/mL) Elix elixir 25 mLs (100 mg total) by Per G Tube route every evening.       Review of patient's allergies indicates:  No Known Allergies    Past Medical History:   Diagnosis Date    Acid reflux     Anemia of chronic disease 8/30/2020    Cerebral palsy     Colitis     Decubitus ulcer of ischial area, left, stage IV 9/10/2020    History of hip surgery     Osteomyelitis of pelvis 9/24/2020    Pneumoperitoneum 9/21/2020    Pressure injury of right ischium, stage 4     S/P percutaneous endoscopic gastrostomy (PEG) tube placement     Seizure disorder 8/26/2020    Seizures     Severe protein-calorie malnutrition     Sinus tachycardia 8/4/2020     Past Surgical History:   Procedure Laterality Date    CHOLECYSTECTOMY      FLEXIBLE SIGMOIDOSCOPY N/A 9/9/2020    Procedure: SIGMOIDOSCOPY, FLEXIBLE;  Surgeon: America Goode MD;  Location: [address];  Service: Endoscopy;  Laterality: N/A;    HIP SURGERY      TRACHEOSTOMY N/A 10/27/2020    Procedure: CREATION, TRACHEOSTOMY;  Surgeon: Kar  MD Louie;  Location: [facility];  Service: ENT;  Laterality: N/A;     Family History     Problem Relation (Age of Onset)    Cancer Maternal Grandfather        Tobacco Use    Smoking status: Never Smoker    Smokeless tobacco: Never Used   Substance and Sexual Activity    Alcohol use: Never     Frequency: Never    Drug use: Not Currently    Sexual activity: Not on file     Review of Systems   Unable to perform ROS: Patient nonverbal     Objective:     Vital Signs (Most Recent):  Temp: 99.1 °F (37.3 °C) (10/27/20 1905)  Pulse: 106 (10/28/20 1412)  Resp: (!) 30 (10/28/20 1339)  BP: 121/78 (10/28/20 1412)  SpO2: 100 % (10/28/20 1339) Vital Signs (24h Range):  Temp:  [99 °F (37.2 °C)-99.1 °F (37.3 °C)] 99.1 °F (37.3 °C)  Pulse:  [] 106  Resp:  [8-30] 30  SpO2:  [100 %] 100 %  BP: ()/(59-92) 121/78     Weight: 34.3 kg (75 lb 9.9 oz)  Body mass index is 16.36 kg/m².    Physical Exam  Constitutional:       Appearance: He is well-developed.   HENT:      Head: Atraumatic.   Eyes:      Conjunctiva/sclera: Conjunctivae normal.   Neck:      Thyroid: No thyromegaly.   Cardiovascular:      Rate and Rhythm: Regular rhythm.   Pulmonary:      Comments: Trach in place  Abdominal:      General: There is no distension.      Palpations: Abdomen is soft. There is no mass.      Comments: Gtube in place in LUQ; well-healed transverse incision in Left-mid abdomen   Musculoskeletal:         General: No tenderness.   Skin:     General: Skin is warm and dry.      Capillary Refill: Capillary refill takes less than 2 seconds.      Comments: +bilateral decubitus posterior ulcers with wound vacs in place with good seal/sxn   Neurological:      Mental Status: He is alert. Mental status is at baseline.         Significant Labs:  CBC:   Recent Labs   Lab 10/28/20  0434   WBC 9.29   RBC 3.13*   HGB 9.1*   HCT 29.1*   *   MCV 93   MCH 29.1   MCHC 31.3*     BMP:   Recent Labs   Lab 10/28/20  0434   GLU 95      K 3.9       CO2 25   BUN 12   CREATININE 0.6   CALCIUM 8.5*   MG 2.0     CMP:   Recent Labs   Lab 10/28/20  0434   GLU 95   CALCIUM 8.5*   ALBUMIN 2.0*   PROT 6.7      K 3.9   CO2 25      BUN 12   CREATININE 0.6   ALKPHOS 162*   ALT 31   AST 33   BILITOT 0.1     LFTs:   Recent Labs   Lab 10/28/20  0434   ALT 31   AST 33   ALKPHOS 162*   BILITOT 0.1   PROT 6.7   ALBUMIN 2.0*       CT:  FINDINGS:  Thoracic soft tissues: No significant abnormality.     Aorta: Normal in course and caliber, without significant atherosclerotic plaque.     Heart: Normal in size. No pericardial effusion.     Nadeen/Mediastinum: No significant lymphadenopathy     Lungs: There is consolidation in the right lower lobe with air bronchograms concerning for pneumonia.  Aspiration could appear similarly.  Additional few scattered areas of consolidation in the right lung.     Liver: Normal in size and attenuation, with no focal hepatic lesions.     Gallbladder: Gallbladder surgically absent.     Bile Ducts: No evidence of dilated ducts.     Pancreas: No mass or peripancreatic fat stranding.     Spleen: Unremarkable.     Adrenals: Unremarkable.     Kidneys/ Ureters: Normal in size and location.  No hydronephrosis or nephrolithiasis. No ureteral dilatation.     Bladder: Iglesias catheter in place.     Reproductive organs: Unremarkable.     GI Tract/Mesentery: Gastrostomy tube in place.  There is a moderate stool burden through the colon.  Predominantly liquid stool.  This represent diarrheal illness.     Peritoneal Space: No ascites. No free air.     Retroperitoneum:  No significant adenopathy.     Abdominal wall and bones: There is a large decubitus ulcer right greater than left.  The right ulcer tracks to the posterior aspect of the right inferior pubic ramus.  Osteomyelitis cannot be excluded.  Deformities of the bilateral hips possibly congenital or related to prior infection.     Vasculature: No significant atherosclerosis or  aneurysm.     Impression:     Right lower lobe probable pneumonia.  Osseous erosion could appear similarly but is less favored.     Additional scattered areas of consolidation in the right lung likely also related to infection.     There is a large decubitus ulcer right greater than left. The right ulcer tracks to the posterior aspect of the right inferior pubic ramus. Osteomyelitis cannot be excluded.     Other incidental findings as above.    Assessment/Plan:     * Right lower lobe pneumonia  Management per primary team    Decubitus ulcer of ischial area, left, stage IV  Stage IV bilateral decub ulcers with diarrhea and stool contamination    - long discussion with the patient's mother regarding the need for stool diversion to allow for healing of his decubitus ulcers.  Discussed that a diverting colostomy would allow for stool to be diverted and not exit through the anus and allow the wounds to attempt to heal the best manner.  Discussed that as the patient does have cerebral palsy and is nonverbal, it is possible, and even likely that the colostomy will be permanent.  She voiced understanding of this and is agreeable to proceed  - plan for laparoscopic versus open colostomy construction tomorrow  - All risks, benefits and alternatives fully explained to patient. Risks include, but are not limited to, bleeding, infection, anastomotic leak, damage to ureter, damage to other intra-abdominal organs such as colon, rectum, small bowel, stomach, liver, bladder, reproductive organs, sexual dysfunction, urinary dysfunction, postoperative abscess, conversion to open operation, perioperative MI, CVA and death.  All questions field and appropriately answered to patient's satisfaction.  Consent signed and placed on chart.  - Hold TFs at [facility] c/s for marking    Status post tracheostomy  Management per primary team    Osteomyelitis of pelvis, presumed   Management per primary team    Severe protein-calorie  malnutrition  Management per primary team    Seizure disorder  Management per primary team    Cerebral palsy  Management per primary team      VTE Risk Mitigation (From admission, onward)         Ordered     enoxaparin injection 30 mg  Every 24 hours      10/27/20 1517     IP VTE HIGH RISK PATIENT  Once      10/22/20 1825     Place sequential compression device  Until discontinued      10/22/20 1754                Thank you for your consult. I will follow-up with patient. Please contact us if you have any additional questions.    Michael Ortez MD  Colorectal Surgery  [facility]

## 2020-10-28 NOTE — ASSESSMENT & PLAN NOTE
Stage IV bilateral decub ulcers with diarrhea and stool contamination    - long discussion with the patient's mother regarding the need for stool diversion to allow for healing of his decubitus ulcers.  Discussed that a diverting colostomy would allow for stool to be diverted and not exit through the anus and allow the wounds to attempt to heal the best manner.  Discussed that as the patient does have cerebral palsy and is nonverbal, it is possible, and even likely that the colostomy will be permanent.  She voiced understanding of this and is agreeable to proceed  - plan for laparoscopic versus open colostomy construction tomorrow  - All risks, benefits and alternatives fully explained to patient. Risks include, but are not limited to, bleeding, infection, anastomotic leak, damage to ureter, damage to other intra-abdominal organs such as colon, rectum, small bowel, stomach, liver, bladder, reproductive organs, sexual dysfunction, urinary dysfunction, postoperative abscess, conversion to open operation, perioperative MI, CVA and death.  All questions field and appropriately answered to patient's satisfaction.  Consent signed and placed on chart.  - Hold TFs at [facility] c/s for marking

## 2020-10-28 NOTE — PROGRESS NOTES
Pharmacokinetic Assessment Follow Up: IV Vancomycin    Vancomycin serum concentration assessment(s):    The random level was drawn correctly and can be used to guide therapy at this time. The measurement is slightly above the desired definitive target of less than 20 mcg/mL.    Vancomycin Regimen Plan:    Give Vancomycin 15 mg/kg = 500 mg IV x 1 dose today (approximately 3.5 hours after random level was collected)    Re-dose when the random level is less than 20 mcg/mL, next level to be drawn at 2000 on 10/28/20 (approximately 12 hours post dose).     Drug levels (last 3 results):  Recent Labs   Lab Result Units 10/26/20  1659 10/28/20  0017 10/28/20  0434   Vancomycin, Random ug/mL  --   --  21.3   Vancomycin-Trough ug/mL 21.8 25.8*  --        Pharmacy will continue to follow and monitor vancomycin.    Please contact pharmacy at extension [phone] for questions regarding this assessment.    Thank you for the consult,   Anjelica Duke PharmD       Patient brief summary:  Glen Moscoso is a 25 y.o. male initiated on antimicrobial therapy with IV Vancomycin for treatment of stage IV decubitus ulcer, recurrent aspiration PNA, presumed osteomyelitis    The patient's current regimen is pulse dosing (dosing by level) when random level is less than 20 mcg/mL.    Drug Allergies:   Review of patient's allergies indicates:  No Known Allergies    Actual Body Weight:   34.3 kg    Renal Function:   Estimated Creatinine Clearance: 92.9 mL/min (based on SCr of 0.6 mg/dL).,     Dialysis Method (if applicable):  N/A    CBC (last 72 hours):  Recent Labs   Lab Result Units 10/26/20  0334 10/27/20  0430 10/28/20  0434   WBC K/uL 10.14 10.67 9.29   Hemoglobin g/dL 9.2* 9.6* 9.1*   Hematocrit % 29.4* 31.4* 29.1*   Platelets K/uL 512* 549* 489*   Gran % % 70.7 75.7* 66.4   Lymph % % 12.9* 13.3* 18.5   Mono % % 10.5 9.8 13.2   Eosinophil % % 5.0 0.1 0.1   Basophil % % 0.2 0.3 0.2   Differential Method  Automated Automated Automated        Metabolic Panel (last 72 hours):  Recent Labs   Lab Result Units 10/26/20  0334 10/27/20  0430 10/28/20  0434   Sodium mmol/L 141 139 137   Potassium mmol/L 3.5 4.0 3.9   Chloride mmol/L 106 104 104   CO2 mmol/L 29 27 25   Glucose mg/dL 98 98 95   BUN mg/dL 3* 7 12   Creatinine mg/dL 0.5 0.5 0.6   Albumin g/dL 1.9* 2.1* 2.0*   Total Bilirubin mg/dL 0.1 0.1 0.1   Alkaline Phosphatase U/L 108 108 162*   AST U/L 15 18 33   ALT U/L 11 15 31   Magnesium mg/dL 2.6 2.1 2.0   Phosphorus mg/dL 2.8 4.0 4.4       Vancomycin Administrations:  vancomycin given in the last 96 hours                   vancomycin 500 mg in dextrose 5 % 100 mL IVPB (ready to mix system) (mg) 500 mg New Bag 10/28/20 0806    vancomycin 750 mg in dextrose 5 % 250 mL IVPB (ready to mix system) (mg) 750 mg New Bag 10/27/20 1336     750 mg New Bag  0108    vancomycin in dextrose 5 % 1 gram/250 mL IVPB 1,000 mg (mg) 1,000 mg New Bag 10/26/20 0448     1,000 mg New Bag 10/25/20 1719     1,000 mg New Bag  0543    vancomycin 750 mg in dextrose 5 % 250 mL IVPB (ready to mix system) (mg) 750 mg New Bag 10/24/20 1733                Microbiologic Results:  Microbiology Results (last 7 days)     Procedure Component Value Units Date/Time    Blood culture x two cultures. Draw prior to antibiotics. [[id number]] Collected: 10/22/20 1545    Order Status: Completed Specimen: Blood from Peripheral, Upper Arm, Right Updated: 10/27/20 2312     Blood Culture, Routine No growth after 5 days.    Narrative:      Aerobic and anaerobic    Blood culture x two cultures. Draw prior to antibiotics. [[id number]] Collected: 10/22/20 1530    Order Status: Completed Specimen: Blood from Peripheral, Upper Arm, Right Updated: 10/27/20 2312     Blood Culture, Routine No growth after 5 days.    Narrative:      Aerobic and anaerobic    Culture, Respiratory with Gram Stain [[id number]]     Order Status: No result Specimen: Respiratory from Endotracheal Aspirate         Thank you for allowing  us to participate in this patient's care.     Anjelica Duke, PharmMAKI 10/28/2020 2:02 PM

## 2020-10-28 NOTE — PROGRESS NOTES
Received message from Dr. Ortez requesting pre-op ostomy marking for diverting colostomy tomorrow. Patient remains awake and alert, nonverbal. Abdomen assessed. Abdomen clipped of hair with surgical clippers and cleansed with saline and gauze. Abdomen is soft, even with no creases or folds, flat. He has a KEYSHAWN-KEY G tube to LUQ for nutrition and intact scars to LUQ. He is currently bedbound due to bilateral ischium stage 4 pressure injuries, and was likely wheelchair bound prior to developing these wounds. He is 100% dependent on caregivers, nonverbal, and will not be providing own ostomy care. He will likely require LTAC at discharge for ongoing wound care needs.  Abdomen scrubbed with chloraprep surgical wand and allowed to air dry. RUQ, RLQ, and LLQ marked with sterile surgical marker (LUQ not marked due to KEYSHAWN-KEY tube presence and scar tissue). Then markings covered with tegaderm. Tolerated care well. Will follow post op for ostomy management and continued wound management.

## 2020-10-28 NOTE — ASSESSMENT & PLAN NOTE
Post op care  Routine trach care  Family will need teaching on trach care and home medical equipment arranged once ready to transition home from other medical issues  --per ENT, ok to remove sutures after 3d; ok for cuff deflation/change at that point but would avoid cuff deflation - needed for aspiration protection

## 2020-10-28 NOTE — PROGRESS NOTES
Patient ventilator repeatedly alarmed apnea during the night. eICU was called and patient has been placed on SIMV rate 8, tidal volume 320, PS 10, PEEP 5, 40%.

## 2020-10-28 NOTE — ASSESSMENT & PLAN NOTE
Recurrent Aspiration Pneumonia  S/P Trach placement 10/27/20  PEG tube for feeding and medication administration   Continue antibiotic for 5 to 7 days   ID consult obtained . Antibiotic extended x 6 weeks for presumed pelvic osteomyelitis

## 2020-10-28 NOTE — PROGRESS NOTES
[facility] Hospital Medicine  Progress Note    Patient Name: Glen Moscoso  MRN: [id number]  Patient Class: IP- Inpatient   Admission Date: 10/22/2020  Length of Stay: 6 days  Attending Physician: Travis Bobo MD  Primary Care Provider: Yadi Lawson MD        Subjective:     Principal Problem:Right lower lobe pneumonia        HPI:  Glen Moscoso is a 25 y.o. male patient with a PMHx of cerebral palsy who presents to the Emergency Department for evaluation of respiratory distress which onset PTA. EMS reports pt was found face down on the pillow in nursing home. Pt arrives tachycardic and tachypneic. Patient recently discharge from [facility] where he was treated for respiratory failure and sepsis. Patient was discharged on 10/10/20 to [facility] on IV Vancomycin, Cefepime and Flagyl to treat pelvic osteomyelitis and resolving colitis. In the ED, WBCs 18.7K, Platelet 681, CO2 20, Lactate 2.3. UA negative. CXR revealed mild atelectasis or infiltrate right perihilar region and right midlung zone. Pt with worsening resp failure Subsequently pt was intubated. Sepsis protocol initiated in the ED including IV hydration. Hospital medicine called for admission. Patient placed in ICU. Patient is a full code, SDM mother, Leni Moscoso at [phone].     Overview/Hospital Course:  10/23  Remains intubated , enteral nutrition initiated. Continued treatment of sepsis due to aspiration pneumonia .due to poor air way protection .  Cultures are with negative growth to date. Temp Max 103 and wbc 19.30   Tachycardia was reported over night , follow up Echo EF 50 % normal diastolic function.  Case discussed with Dr Palma who has agreed for trach placement due to recurrent aspiration pneumonia.    10/24    Remains intubated, Tmax of 101.5 .CT Abd/pelvis - right inferior pubic ramus -cannot exclude     Osteomyelitis.      10/25   Self extubated, presently on Room air    Case discussed with Dr Palma who has agreed  for trach placement due to recurrent aspiration pneumonia.  10/26- Afebrile. Remains on RA with satisfactory SpO2. Trach placement planned for tomorrow . Wound vac placed to Rt./Lt ischium stage IV pressure ulcers with full thickness tissue loss with exposed bone. General Surgery consulted  to evaluate pt for diverting colostomy to prevent fecal contamination of wound . Rectal tube in place. Labs- WBC normalize 10.1, Hbg 9.2, Pl 512, creatinine 0.5   10/27- S/P tracheostomy placement today. Remains sedated on mechanical ventilation via  trach . Labs are unremarkable . General Surgery is recommending to continue wound vac and rectal tube for now , diverting colostomy is not suggested at this time.   10/28- Tmax 99. Transition to trach collar . SpO2 100% on O2 at %L/min . No distress noted . Per nursing staff liquid stool sips around Rectal tube . Wound vac contaminated with liquid stool . Spoke to Dr. Ortez and he will evaluate pt for diverting colostomy . ID consult obtained and recs are noted. Add Questran powder for loose stool. Labs are unremarkable.     Interval History:   Transition to trach collar. Wound vac contaminated with liquid stool . Spoke to Dr. Ortez and he will evaluate pt for diverting colostomy . ID consult obtained and recs are noted.      Review of Systems   Unable to perform ROS: Patient nonverbal     Objective:     Vital Signs (Most Recent):  Temp: 99.1 °F (37.3 °C) (10/27/20 1905)  Pulse: 106 (10/28/20 1412)  Resp: (!) 30 (10/28/20 1339)  BP: 121/78 (10/28/20 1412)  SpO2: 100 % (10/28/20 1339) Vital Signs (24h Range):  Temp:  [99 °F (37.2 °C)-99.1 °F (37.3 °C)] 99.1 °F (37.3 °C)  Pulse:  [] 106  Resp:  [8-30] 30  SpO2:  [100 %] 100 %  BP: ()/(59-92) 121/78     Weight: 34.3 kg (75 lb 9.9 oz)  Body mass index is 16.36 kg/m².    Intake/Output Summary (Last 24 hours) at 10/28/2020 1433  Last data filed at 10/28/2020 0600  Gross per 24 hour   Intake 1225 ml   Output 1060 ml   Net 165  ml      Physical Exam  Constitutional:       General: He is not in acute distress.     Appearance: He is well-developed. He is not diaphoretic.      Comments: Awake , does not communicate    HENT:      Head: Normocephalic and atraumatic.      Mouth/Throat:      Pharynx: No oropharyngeal exudate.   Eyes:      Conjunctiva/sclera: Conjunctivae normal.      Pupils: Pupils are equal, round, and reactive to light.   Neck:      Musculoskeletal: Normal range of motion and neck supple.      Thyroid: No thyromegaly.      Vascular: No JVD.      Comments: Trach collar in place   Cardiovascular:      Rate and Rhythm: Normal rate and regular rhythm.      Heart sounds: Normal heart sounds. No murmur.   Pulmonary:      Effort: Pulmonary effort is normal. No respiratory distress.      Breath sounds: Normal breath sounds. No wheezing or rales.      Comments: Bronchial breath sounds kathia   Chest:      Chest wall: No tenderness.   Abdominal:      General: Bowel sounds are normal. There is no distension.      Palpations: Abdomen is soft.      Tenderness: There is no abdominal tenderness. There is no guarding or rebound.   Musculoskeletal:      Comments: Wound vac dressings in place    Lymphadenopathy:      Cervical: No cervical adenopathy.   Skin:     General: Skin is warm and dry.      Findings: No rash.   Neurological:      Comments: Pt is awake , nonverbal          Significant Labs:   CBC:   Recent Labs   Lab 10/27/20  0430 10/28/20  0434   WBC 10.67 9.29   HGB 9.6* 9.1*   HCT 31.4* 29.1*   * 489*     CMP:   Recent Labs   Lab 10/27/20  0430 10/28/20  0434    137   K 4.0 3.9    104   CO2 27 25   GLU 98 95   BUN 7 12   CREATININE 0.5 0.6   CALCIUM 9.0 8.5*   PROT 7.0 6.7   ALBUMIN 2.1* 2.0*   BILITOT 0.1 0.1   ALKPHOS 108 162*   AST 18 33   ALT 15 31   ANIONGAP 8 8   EGFRNONAA >60 >60       Significant Imaging:       Assessment/Plan:      * Right lower lobe pneumonia  Recurrent Aspiration Pneumonia  S/P Trach  placement 10/27/20  PEG tube for feeding and medication administration   Continue antibiotic for 5 to 7 days   ID consult obtained . Antibiotic extended x 6 weeks for presumed pelvic osteomyelitis     Status post tracheostomy  -Trach placed and transition to trach collar       Tachycardia  Due to infection  ABX  Symptomatic care  Monitor        Osteomyelitis of pelvis, presumed   ABX  Wound Care  ID on Consult      Severe protein-calorie malnutrition  Enteral tube feeds   Continue supplements        Decubitus ulcer of ischial area, left, stage IV  Wound Care  Antibiotics per ID  PICC in place  Possible osteomyelitis   Wound vac placed   General Surgery consult to evaluate for diverting colostomy to prevent fecal contamination of wound.       Seizure disorder  Phenobarbital  Neuro checks    Cerebral palsy  Supportive care       VTE Risk Mitigation (From admission, onward)         Ordered     enoxaparin injection 30 mg  Every 24 hours      10/27/20 1517     IP VTE HIGH RISK PATIENT  Once      10/22/20 1825     Place sequential compression device  Until discontinued      10/22/20 1754                Discharge Planning   ROCÍO:      Code Status: Full Code   Is the patient medically ready for discharge?:     Reason for patient still in hospital (select all that apply): Patient trending condition  Discharge Plan A: Long-term acute care facility (LTAC)   Discharge Delays: (!) Change in Medical Condition        Critical care time spent on the evaluation and treatment of severe organ dysfunction, review of pertinent labs and imaging studies, discussions with consulting providers and discussions with patient/family: 30  minutes.      Travis Bobo MD  Department of Hospital Medicine   [facility]

## 2020-10-28 NOTE — ASSESSMENT & PLAN NOTE
Recent flouroquinolone resistant seratia pneumonia  Empiric vanc, zosyn  Culture endotracheal aspirate  Monitor temp, wbc, culture data  Recommend trach placement for airway protection from recurrent aspiration pneumonias; discussed with mother and answered all questions. Consulted ENT and spoke with Dr Palma who will plan for trach placement on Tuesday 10/27/20 at 8am  10/25 - self extubated and no indication for reintubation now but trach placement for airway protection remains recommendation   10/26 - plan for trach placement for airway protection from ongoing aspiration tomorrow; would complete 7-10 day antibiotic course given complicated recent lung history (currently day 5)  10/27 - trach in place for protection from ongoing aspiration of secretions; cotninue abx day 6  10/28 - abx day 7; afebrile and nl wbc, will d/c after day 7 complete; monitor temp, wbc

## 2020-10-28 NOTE — SUBJECTIVE & OBJECTIVE
Interval History:   Transition to trach collar. Wound vac contaminated with liquid stool . Spoke to Dr. Ortez and he will evaluate pt for diverting colostomy . ID consult obtained and recs are noted.      Review of Systems   Unable to perform ROS: Patient nonverbal     Objective:     Vital Signs (Most Recent):  Temp: 99.1 °F (37.3 °C) (10/27/20 1905)  Pulse: 106 (10/28/20 1412)  Resp: (!) 30 (10/28/20 1339)  BP: 121/78 (10/28/20 1412)  SpO2: 100 % (10/28/20 1339) Vital Signs (24h Range):  Temp:  [99 °F (37.2 °C)-99.1 °F (37.3 °C)] 99.1 °F (37.3 °C)  Pulse:  [] 106  Resp:  [8-30] 30  SpO2:  [100 %] 100 %  BP: ()/(59-92) 121/78     Weight: 34.3 kg (75 lb 9.9 oz)  Body mass index is 16.36 kg/m².    Intake/Output Summary (Last 24 hours) at 10/28/2020 1433  Last data filed at 10/28/2020 0600  Gross per 24 hour   Intake 1225 ml   Output 1060 ml   Net 165 ml      Physical Exam  Constitutional:       General: He is not in acute distress.     Appearance: He is well-developed. He is not diaphoretic.      Comments: Awake , does not communicate    HENT:      Head: Normocephalic and atraumatic.      Mouth/Throat:      Pharynx: No oropharyngeal exudate.   Eyes:      Conjunctiva/sclera: Conjunctivae normal.      Pupils: Pupils are equal, round, and reactive to light.   Neck:      Musculoskeletal: Normal range of motion and neck supple.      Thyroid: No thyromegaly.      Vascular: No JVD.      Comments: Trach collar in place   Cardiovascular:      Rate and Rhythm: Normal rate and regular rhythm.      Heart sounds: Normal heart sounds. No murmur.   Pulmonary:      Effort: Pulmonary effort is normal. No respiratory distress.      Breath sounds: Normal breath sounds. No wheezing or rales.      Comments: Bronchial breath sounds kathia   Chest:      Chest wall: No tenderness.   Abdominal:      General: Bowel sounds are normal. There is no distension.      Palpations: Abdomen is soft.      Tenderness: There is no abdominal  tenderness. There is no guarding or rebound.   Musculoskeletal:      Comments: Wound vac dressings in place    Lymphadenopathy:      Cervical: No cervical adenopathy.   Skin:     General: Skin is warm and dry.      Findings: No rash.   Neurological:      Comments: Pt is awake , nonverbal          Significant Labs:   CBC:   Recent Labs   Lab 10/27/20  0430 10/28/20  0434   WBC 10.67 9.29   HGB 9.6* 9.1*   HCT 31.4* 29.1*   * 489*     CMP:   Recent Labs   Lab 10/27/20  0430 10/28/20  0434    137   K 4.0 3.9    104   CO2 27 25   GLU 98 95   BUN 7 12   CREATININE 0.5 0.6   CALCIUM 9.0 8.5*   PROT 7.0 6.7   ALBUMIN 2.1* 2.0*   BILITOT 0.1 0.1   ALKPHOS 108 162*   AST 18 33   ALT 15 31   ANIONGAP 8 8   EGFRNONAA >60 >60       Significant Imaging:

## 2020-10-28 NOTE — PROGRESS NOTES
Pharmacokinetic Assessment Follow Up: IV Vancomycin    Vancomycin serum concentration assessment(s):  Pt previously on vancomycin 750 mg every 12 hours  Trough @ 0017 (about 11 hours after last dose) = 25.8 mcg/ml (supra-therapeutic)     Vancomycin Regimen Plan:  We will HOLD 0100 AM dose  We will check a random level in another 6 hours around 0600 AM  If level < 20 mcg/ml, we will reinitiate at a lower dose of 500 mg every 12 hours   Goal trough: 15-20 mcg/ml    Drug levels (last 3 results):  Recent Labs   Lab Result Units 10/25/20  0442 10/26/20  1659 10/28/20  0017   Vancomycin-Trough ug/mL 11.8 21.8 25.8*       Pharmacy will continue to follow and monitor vancomycin.    Please contact pharmacy at extension [phone] for questions regarding this assessment.    Thank you for the consult,   Katherine McArdle, Pharm.D. 10/28/2020 1:16 AM       Patient brief summary:  Glen Moscoso is a 25 y.o. male initiated on antimicrobial therapy with IV Vancomycin for treatment of bone/joint infection, pneumonia    The patient's current regimen is 500 mg every 12 hours pending the AM random level     Drug Allergies:   Review of patient's allergies indicates:  No Known Allergies    Actual Body Weight:   34.3 kg    Renal Function:   Estimated Creatinine Clearance: 111.5 mL/min (based on SCr of 0.5 mg/dL). -- pt has cerebral palsy so creatinine is not a good representation of kidney function     CBC (last 72 hours):  Recent Labs   Lab Result Units 10/25/20  0442 10/26/20  0334 10/27/20  0430   WBC K/uL 10.77 10.14 10.67   Hemoglobin g/dL 9.5* 9.2* 9.6*   Hematocrit % 30.2* 29.4* 31.4*   Platelets K/uL 533* 512* 549*   Gran % % 79.1* 70.7 75.7*   Lymph % % 8.3* 12.9* 13.3*   Mono % % 11.8 10.5 9.8   Eosinophil % % 0.0 5.0 0.1   Basophil % % 0.2 0.2 0.3   Differential Method  Automated Automated Automated       Metabolic Panel (last 72 hours):  Recent Labs   Lab Result Units 10/25/20  0442 10/26/20  0334 10/27/20  0430   Sodium  mmol/L 142 141 139   Potassium mmol/L 3.6 3.5 4.0   Chloride mmol/L 107 106 104   CO2 mmol/L 29 29 27   Glucose mg/dL 113* 98 98   BUN mg/dL 5* 3* 7   Creatinine mg/dL 0.5 0.5 0.5   Albumin g/dL 1.9* 1.9* 2.1*   Total Bilirubin mg/dL 0.3 0.1 0.1   Alkaline Phosphatase U/L 123 108 108   AST U/L 13 15 18   ALT U/L 13 11 15   Magnesium mg/dL 2.0 2.6 2.1   Phosphorus mg/dL 2.4* 2.8 4.0       Vancomycin Administrations:  vancomycin given in the last 96 hours                   vancomycin 750 mg in dextrose 5 % 250 mL IVPB (ready to mix system) (mg) 750 mg New Bag 10/27/20 1336     750 mg New Bag  0108    vancomycin in dextrose 5 % 1 gram/250 mL IVPB 1,000 mg (mg) 1,000 mg New Bag 10/26/20 0448     1,000 mg New Bag 10/25/20 1719     1,000 mg New Bag  0543    vancomycin 750 mg in dextrose 5 % 250 mL IVPB (ready to mix system) (mg) 750 mg New Bag 10/24/20 1733     750 mg New Bag  0542                Microbiologic Results:  Microbiology Results (last 7 days)     Procedure Component Value Units Date/Time    Blood culture x two cultures. Draw prior to antibiotics. [[id number]] Collected: 10/22/20 1545    Order Status: Completed Specimen: Blood from Peripheral, Upper Arm, Right Updated: 10/27/20 2312     Blood Culture, Routine No growth after 5 days.    Narrative:      Aerobic and anaerobic    Blood culture x two cultures. Draw prior to antibiotics. [[id number]] Collected: 10/22/20 1530    Order Status: Completed Specimen: Blood from Peripheral, Upper Arm, Right Updated: 10/27/20 2312     Blood Culture, Routine No growth after 5 days.    Narrative:      Aerobic and anaerobic    Culture, Respiratory with Gram Stain [[id number]]     Order Status: No result Specimen: Respiratory from Endotracheal Aspirate

## 2020-10-28 NOTE — PROGRESS NOTES
[facility] -   Critical Care Medicine  Progress Note    Patient Name: Glen Moscoso  MRN: [id number]  Admission Date: 10/22/2020  Hospital Length of Stay: 6 days  Code Status: Full Code  Attending Provider: Travis Bobo MD  Primary Care Provider: Yadi Lawson MD   Principal Problem: Right lower lobe pneumonia    Subjective:     HPI:  25 year old male with spastic cerebral palsy, severe contractures, seizure disorder, malnutrition  Presented to ED from LTAC when found face down, unresponsive, with hypoxia  OF NOTE - recent hospitalizations x 2 for pneumonia (serratia), second hospitalization complicated with osteomyelitis from sacral decubitus (underwent surgical I&D) and pneumoperitoneum from suspected colonic perforation that was managed conservatively    On arrival today respiratory rate 50, , sat 89%, SBP 170s, and fever 102  Intubated on ED arrival with anesthesia assistance  Labs revealed leukocytosis, lactic acid 2.3,and procalcitonin 0.12  CT chest abd pelvis revealed - Right lower lobe probable pneumonia.  Osseous erosion could appear similarly but is less favored.  Additional scattered areas of consolidation in the right lung likely also related to infection.  There is a large decubitus ulcer right greater than left. The right ulcer tracks to the posterior aspect of the right inferior pubic ramus. Osteomyelitis cannot be excluded.     Hospital/ICU Course:  Arrived to ICU with OETT to mechanical ventilation, propofol sedation  10/23 - remains intubated on mechanical ventilation; oxygen demand decreased; large amount of thick secretions from trach and oral pharynx  10/24 - Intubated, improved aeration of lung  10/25 - self extubated yesterday; self removed midline overnight  10/26 - weaned to room air; continue drainage from wounds and frequent liquid stool contamination required flexiseal system and wound vac application  10/27 - seen and examined on return from OR tracheostomy  placement; remains sedated on mechanical ventilation via new #8 shiley trach with obturator at head of bed  10/28 - seen and examined on mechanical ventilation and again after transition to trach collar; wound vac contaminated with liquid stool overnight s/t fecal management system leakage, vac changed by WOC today    Review of Systems   Unable to perform ROS: Patient nonverbal     Objective:     Vital Signs (Most Recent):  Temp: 99.1 °F (37.3 °C) (10/27/20 1905)  Pulse: (!) 118 (10/28/20 0941)  Resp: (!) 25 (10/28/20 0834)  BP: 120/81 (10/28/20 0941)  SpO2: 100 % (10/28/20 0834) Vital Signs (24h Range):  Temp:  [99 °F (37.2 °C)-99.1 °F (37.3 °C)] 99.1 °F (37.3 °C)  Pulse:  [] 118  Resp:  [8-42] 25  SpO2:  [100 %] 100 %  BP: ()/(59-92) 120/81     Weight: 34.3 kg (75 lb 9.9 oz)  Body mass index is 16.36 kg/m².      Intake/Output Summary (Last 24 hours) at 10/28/2020 1116  Last data filed at 10/28/2020 0600  Gross per 24 hour   Intake 1475 ml   Output 1175 ml   Net 300 ml         Physical Exam  Vitals signs and nursing note reviewed.   Constitutional:       General: He is awake.      Appearance: He is underweight. He is ill-appearing.      Comments: Bilateral mittens in place   HENT:      Head: Atraumatic.      Mouth/Throat:      Mouth: Mucous membranes are moist.   Eyes:      Conjunctiva/sclera: Conjunctivae normal.      Pupils: Pupils are equal, round, and reactive to light.   Neck:      Musculoskeletal: No edema.      Vascular: No JVD.      Comments: Severe bilateral upper and lower extremity contractures  Cardiovascular:      Rate and Rhythm: Regular rhythm. Tachycardia present.      Pulses:           Radial pulses are 2+ on the right side and 2+ on the left side.        Dorsalis pedis pulses are 1+ on the right side and 1+ on the left side.   Pulmonary:      Breath sounds: Decreased air movement present. Decreased breath sounds present. No wheezing, rhonchi or rales.   Abdominal:      General: Abdomen  is protuberant. Bowel sounds are normal. There is no distension.      Palpations: Abdomen is soft.      Tenderness: There is no abdominal tenderness.      Comments: Scarring around button PEG   Musculoskeletal:      Right lower leg: No edema.      Left lower leg: No edema.   Skin:     General: Skin is warm and dry.      Capillary Refill: Capillary refill takes 2 to 3 seconds.          Neurological:      Mental Status: He is alert.      GCS: GCS eye subscore is 4. GCS verbal subscore is 1. GCS motor subscore is 4.           Lines/Drains/Airways     Peripherally Inserted Central Catheter Line            PICC Double Lumen 10/22/20 1300 5 days          Drain                 Gastrostomy/Enterostomy 08/04/20 1653 Percutaneous endoscopic gastrostomy (PEG) feeding 84 days         Gastrostomy/Enterostomy 10/22/20 1300 LUQ 5 days         Urethral Catheter 10/22/20 1653 Latex 16 Fr. 5 days         Rectal Tube 10/25/20 1400 rectal tube w/ balloon (indicate number of mLs) 2 days          Airway                 Surgical Airway 10/27/20 0855 Shiley Cuffed 1 day                Significant Labs:    CBC/Anemia Profile:  Recent Labs   Lab 10/27/20  0430 10/28/20  0434   WBC 10.67 9.29   HGB 9.6* 9.1*   HCT 31.4* 29.1*   * 489*   MCV 93 93   RDW 13.8 14.2        Chemistries:  Recent Labs   Lab 10/27/20  0430 10/28/20  0434    137   K 4.0 3.9    104   CO2 27 25   BUN 7 12   CREATININE 0.5 0.6   CALCIUM 9.0 8.5*   ALBUMIN 2.1* 2.0*   PROT 7.0 6.7   BILITOT 0.1 0.1   ALKPHOS 108 162*   ALT 15 31   AST 18 33   MG 2.1 2.0   PHOS 4.0 4.4       All pertinent labs within the past 24 hours have been reviewed.    Significant Imaging:  I have reviewed all pertinent imaging results/findings within the past 24 hours.      ABG  Recent Labs   Lab 10/24/20  0451   PH 7.398   PO2 140*   PCO2 40.4   HCO3 24.9   BE 0     Assessment/Plan:     Neuro  Seizure disorder  Continue home dose phenobarbital    Cerebral palsy  continue baclofen,  neurontin    ENT  Status post tracheostomy  Post op care  Routine trach care  Family will need teaching on trach care and home medical equipment arranged once ready to transition home from other medical issues  --per ENT, ok to remove sutures after 3d; ok for cuff deflation/change at that point but would avoid cuff deflation - needed for aspiration protection    Derm  Decubitus ulcer of ischial area, left, stage IV  Appreciate [facility] eval, follow care recs  10/25 - increased drainage and frequent stool contamination per nursing staff - place fecal management system and plan discuss with WO tomorrow, could benefit from wound vac placement  10/26 - reviewed with [facility] RN, wound vac applied; recommendation from [facility] for consideration of diverting ostomy noted, will discuss with attending MD  10/27-  Wound vac in place; contamination reported overnight despite fecal management system; discussed diverting ostomy with attending and general surgery yesterday - goal avoid additional surgery and appliance if able, continue vac and flexiseal for now and monitor  10/28 - vac changed due to loss of suction and contamination with feces despite fecal management system    Pulmonary  * Right lower lobe pneumonia  Recent flouroquinolone resistant seratia pneumonia  Empiric vanc, zosyn  Culture endotracheal aspirate  Monitor temp, wbc, culture data  Recommend trach placement for airway protection from recurrent aspiration pneumonias; discussed with mother and answered all questions. Consulted ENT and spoke with Dr Palma who will plan for trach placement on Tuesday 10/27/20 at 8am  10/25 - self extubated and no indication for reintubation now but trach placement for airway protection remains recommendation   10/26 - plan for trach placement for airway protection from ongoing aspiration tomorrow; would complete 7-10 day antibiotic course given complicated recent lung history (currently day 5)  10/27 - trach in place for protection from  ongoing aspiration of secretions; cotninue abx day 6  10/28 - abx day 7; afebrile and nl wbc, will d/c after day 7 complete; monitor temp, wbc    Cardiac/Vascular  Tachycardia  Suspect baseline sinus tachycardia exacerbated by fever, sepsis  Holding enteral propanolol given marginal BP  Optimize analgesia, sedation  10/23 - resume home dose propanolol    ID  Osteomyelitis of pelvis  Transferred to LTAC on 10/10 with plan for IV abx and recs to continue until 10/22  Currently on abx coverage for aspiration pneumonia   If concern for ongoing osteo, will likely need MRI  10/26 - no current evidence for ongoing osteomyelitis; monitor    Endocrine  Severe protein-calorie malnutrition  TF per RD recs      Critical Care Daily Checklist:    A: Awake: RASS Goal/Actual Goal: RASS Goal: 0-->alert and calm  Actual: [place] Agitation Sedation Scale (RASS): Restless   B: Spontaneous Breathing Trial Performed? Spon. Breathing Trial Initiated?: (S) Initiated (10/23/20 0800)   C: SAT & SBT Coordinated?  yes                      D: Delirium: CAM-ICU Overall CAM-ICU: Positive   E: Early Mobility Performed? Yes   F: Feeding Goal: Goals: Meet >75% EEN/EPN by RD f/u  Status: Nutrition Goal Status: new   Current Diet Order   Procedures    Diet NPO      AS: Analgesia/Sedation prn   T: Thromboembolic Prophylaxis lovenox   H: HOB > 300 Yes   U: Stress Ulcer Prophylaxis (if needed) pepcid   G: Glucose Control monitoring   B: Bowel Function Stool Occurrence: 1(leaking around FMS)   I: Indwelling Catheter (Lines & Iglesias) Necessity reviewed   D: De-escalation of Antimicrobials/Pharmacotherapies reviewed    Plan for the day/ETD trach collar    Code Status:  Family/Goals of Care: Full Code  Anticipate LTAC on discharge   I have discussed case and plan of care in detail with Dr Block; Status and plan of care were discussed with team on multidisciplinary rounds.  Ok for transfer out of ICU from pulmonary/critical care standpoint.     Vernell  JEFF Sdihu-BC  Critical Care Medicine  [facility] [place]

## 2020-10-28 NOTE — ASSESSMENT & PLAN NOTE
Will treat for presumed osteomyelitis , might need diverting colostomy -discuss with primary team ,   Will plan to treat with Vancomycin, Cefepime and Flagyl for 6 weeks -EOC -12/09/2020

## 2020-10-28 NOTE — HPI
25-year-old male with a history of cerebral palsy who is admitted to the ICU with respiratory failure and pneumonia.  Patient has had known bilateral decubitus ulcers that have been difficult to heal with osteomyelitis.  Patient has had continued loose stools and difficulty the diverting the stool away from the wounds even with wound VAC therapy.  During this admission he has also undergone a tracheostomy by ENT.  Colorectal surgery is consulted for evaluation for possible diverting colostomy given the difficulty with diarrhea and wound contamination from stool as the wounds are so close to his anus.  Per his mother who is his primary caregiver and surrogate decision maker, the patient has had a feeding tube placed and his abdomen.  Patient is nonverbal.

## 2020-10-28 NOTE — ASSESSMENT & PLAN NOTE
Appreciate [facility] eval, follow care recs  10/25 - increased drainage and frequent stool contamination per nursing staff - place fecal management system and plan discuss with [facility] tomorrow, could benefit from wound vac placement  10/26 - reviewed with [facility] RN, wound vac applied; recommendation from [facility] for consideration of diverting ostomy noted, will discuss with attending MD  10/27-  Wound vac in place; contamination reported overnight despite fecal management system; discussed diverting ostomy with attending and general surgery yesterday - goal avoid additional surgery and appliance if able, continue vac and flexiseal for now and monitor  10/28 - vac changed due to loss of suction and contamination with feces despite fecal management system

## 2020-10-29 ENCOUNTER — ANESTHESIA (OUTPATIENT)
Dept: SURGERY | Facility: HOSPITAL | Age: 25
DRG: 003 | End: 2020-10-29
Payer: MEDICAID

## 2020-10-29 PROBLEM — R06.03 RESPIRATORY DISTRESS: Status: ACTIVE | Noted: 2020-10-29

## 2020-10-29 PROBLEM — G80.9 CEREBRAL PALSY: Chronic | Status: ACTIVE | Noted: 2020-08-04

## 2020-10-29 PROBLEM — G40.909 SEIZURE DISORDER: Chronic | Status: ACTIVE | Noted: 2020-08-26

## 2020-10-29 PROBLEM — L89.324 DECUBITUS ULCER OF ISCHIAL AREA, LEFT, STAGE IV: Chronic | Status: ACTIVE | Noted: 2020-09-10

## 2020-10-29 PROBLEM — D64.9 ANEMIA, UNSPECIFIED: Status: ACTIVE | Noted: 2020-08-30

## 2020-10-29 PROBLEM — M86.9 OSTEOMYELITIS OF PELVIS: Chronic | Status: ACTIVE | Noted: 2020-09-24

## 2020-10-29 LAB
ABO + RH BLD: NORMAL
ALBUMIN SERPL BCP-MCNC: 2.1 G/DL (ref 3.5–5.2)
ALP SERPL-CCNC: 155 U/L (ref 55–135)
ALT SERPL W/O P-5'-P-CCNC: 24 U/L (ref 10–44)
ANION GAP SERPL CALC-SCNC: 5 MMOL/L (ref 8–16)
AST SERPL-CCNC: 18 U/L (ref 10–40)
BASOPHILS # BLD AUTO: 0.02 K/UL (ref 0–0.2)
BASOPHILS NFR BLD: 0.2 % (ref 0–1.9)
BILIRUB SERPL-MCNC: 0.1 MG/DL (ref 0.1–1)
BLD GP AB SCN CELLS X3 SERPL QL: NORMAL
BUN SERPL-MCNC: 14 MG/DL (ref 6–20)
CALCIUM SERPL-MCNC: 8.8 MG/DL (ref 8.7–10.5)
CHLORIDE SERPL-SCNC: 107 MMOL/L (ref 95–110)
CO2 SERPL-SCNC: 27 MMOL/L (ref 23–29)
CREAT SERPL-MCNC: 0.5 MG/DL (ref 0.5–1.4)
CRP SERPL-MCNC: 69.4 MG/L (ref 0–8.2)
DIFFERENTIAL METHOD: ABNORMAL
EOSINOPHIL # BLD AUTO: 0 K/UL (ref 0–0.5)
EOSINOPHIL NFR BLD: 0 % (ref 0–8)
ERYTHROCYTE [DISTWIDTH] IN BLOOD BY AUTOMATED COUNT: 14.1 % (ref 11.5–14.5)
ERYTHROCYTE [SEDIMENTATION RATE] IN BLOOD BY WESTERGREN METHOD: 97 MM/HR (ref 0–10)
EST. GFR  (AFRICAN AMERICAN): >60 ML/MIN/1.73 M^2
EST. GFR  (NON AFRICAN AMERICAN): >60 ML/MIN/1.73 M^2
GLUCOSE SERPL-MCNC: 95 MG/DL (ref 70–110)
HCT VFR BLD AUTO: 29.3 % (ref 40–54)
HGB BLD-MCNC: 8.8 G/DL (ref 14–18)
IMM GRANULOCYTES # BLD AUTO: 0.32 K/UL (ref 0–0.04)
IMM GRANULOCYTES NFR BLD AUTO: 3.3 % (ref 0–0.5)
LYMPHOCYTES # BLD AUTO: 1.4 K/UL (ref 1–4.8)
LYMPHOCYTES NFR BLD: 14.6 % (ref 18–48)
MAGNESIUM SERPL-MCNC: 2.2 MG/DL (ref 1.6–2.6)
MCH RBC QN AUTO: 28.1 PG (ref 27–31)
MCHC RBC AUTO-ENTMCNC: 30 G/DL (ref 32–36)
MCV RBC AUTO: 94 FL (ref 82–98)
MONOCYTES # BLD AUTO: 1.2 K/UL (ref 0.3–1)
MONOCYTES NFR BLD: 11.8 % (ref 4–15)
NEUTROPHILS # BLD AUTO: 6.8 K/UL (ref 1.8–7.7)
NEUTROPHILS NFR BLD: 70.1 % (ref 38–73)
NRBC BLD-RTO: 0 /100 WBC
PHOSPHATE SERPL-MCNC: 3 MG/DL (ref 2.7–4.5)
PLATELET # BLD AUTO: 439 K/UL (ref 150–350)
PMV BLD AUTO: 8.9 FL (ref 9.2–12.9)
POTASSIUM SERPL-SCNC: 3.9 MMOL/L (ref 3.5–5.1)
PROT SERPL-MCNC: 6.7 G/DL (ref 6–8.4)
RBC # BLD AUTO: 3.13 M/UL (ref 4.6–6.2)
SODIUM SERPL-SCNC: 139 MMOL/L (ref 136–145)
VANCOMYCIN SERPL-MCNC: 14.1 UG/ML
VANCOMYCIN SERPL-MCNC: 23 UG/ML
WBC # BLD AUTO: 9.75 K/UL (ref 3.9–12.7)

## 2020-10-29 PROCEDURE — 25000003 PHARM REV CODE 250: Performed by: NURSE ANESTHETIST, CERTIFIED REGISTERED

## 2020-10-29 PROCEDURE — 37000008 HC ANESTHESIA 1ST 15 MINUTES: Performed by: COLON & RECTAL SURGERY

## 2020-10-29 PROCEDURE — 27000221 HC OXYGEN, UP TO 24 HOURS

## 2020-10-29 PROCEDURE — 80053 COMPREHEN METABOLIC PANEL: CPT

## 2020-10-29 PROCEDURE — 44188 PR LAP, SURG COLOSTOMY: ICD-10-PCS | Mod: ,,, | Performed by: COLON & RECTAL SURGERY

## 2020-10-29 PROCEDURE — 86140 C-REACTIVE PROTEIN: CPT

## 2020-10-29 PROCEDURE — 27201423 OPTIME MED/SURG SUP & DEVICES STERILE SUPPLY: Performed by: COLON & RECTAL SURGERY

## 2020-10-29 PROCEDURE — 99233 PR SUBSEQUENT HOSPITAL CARE,LEVL III: ICD-10-PCS | Mod: ,,, | Performed by: INTERNAL MEDICINE

## 2020-10-29 PROCEDURE — 63600175 PHARM REV CODE 636 W HCPCS: Performed by: NURSE PRACTITIONER

## 2020-10-29 PROCEDURE — 25000003 PHARM REV CODE 250: Performed by: NURSE PRACTITIONER

## 2020-10-29 PROCEDURE — 25000003 PHARM REV CODE 250: Performed by: COLON & RECTAL SURGERY

## 2020-10-29 PROCEDURE — 80202 ASSAY OF VANCOMYCIN: CPT | Mod: 91

## 2020-10-29 PROCEDURE — 86850 RBC ANTIBODY SCREEN: CPT

## 2020-10-29 PROCEDURE — 25000003 PHARM REV CODE 250: Performed by: INTERNAL MEDICINE

## 2020-10-29 PROCEDURE — 99232 SBSQ HOSP IP/OBS MODERATE 35: CPT | Mod: 57,,, | Performed by: COLON & RECTAL SURGERY

## 2020-10-29 PROCEDURE — 44188 LAP COLOSTOMY: CPT | Mod: ,,, | Performed by: COLON & RECTAL SURGERY

## 2020-10-29 PROCEDURE — 25000242 PHARM REV CODE 250 ALT 637 W/ HCPCS: Performed by: COLON & RECTAL SURGERY

## 2020-10-29 PROCEDURE — 99900035 HC TECH TIME PER 15 MIN (STAT)

## 2020-10-29 PROCEDURE — 25000242 PHARM REV CODE 250 ALT 637 W/ HCPCS: Performed by: NURSE PRACTITIONER

## 2020-10-29 PROCEDURE — 20000000 HC ICU ROOM

## 2020-10-29 PROCEDURE — 99233 SBSQ HOSP IP/OBS HIGH 50: CPT | Mod: ,,, | Performed by: INTERNAL MEDICINE

## 2020-10-29 PROCEDURE — 85651 RBC SED RATE NONAUTOMATED: CPT

## 2020-10-29 PROCEDURE — 94640 AIRWAY INHALATION TREATMENT: CPT

## 2020-10-29 PROCEDURE — 63600175 PHARM REV CODE 636 W HCPCS: Performed by: COLON & RECTAL SURGERY

## 2020-10-29 PROCEDURE — 36000711: Performed by: COLON & RECTAL SURGERY

## 2020-10-29 PROCEDURE — 83735 ASSAY OF MAGNESIUM: CPT

## 2020-10-29 PROCEDURE — 99900026 HC AIRWAY MAINTENANCE (STAT)

## 2020-10-29 PROCEDURE — C9290 INJ, BUPIVACAINE LIPOSOME: HCPCS | Performed by: COLON & RECTAL SURGERY

## 2020-10-29 PROCEDURE — 37000009 HC ANESTHESIA EA ADD 15 MINS: Performed by: COLON & RECTAL SURGERY

## 2020-10-29 PROCEDURE — 63600175 PHARM REV CODE 636 W HCPCS: Performed by: INTERNAL MEDICINE

## 2020-10-29 PROCEDURE — 36000710: Performed by: COLON & RECTAL SURGERY

## 2020-10-29 PROCEDURE — 85025 COMPLETE CBC W/AUTO DIFF WBC: CPT

## 2020-10-29 PROCEDURE — 84100 ASSAY OF PHOSPHORUS: CPT

## 2020-10-29 PROCEDURE — 63600175 PHARM REV CODE 636 W HCPCS: Performed by: NURSE ANESTHETIST, CERTIFIED REGISTERED

## 2020-10-29 PROCEDURE — 99232 PR SUBSEQUENT HOSPITAL CARE,LEVL II: ICD-10-PCS | Mod: 57,,, | Performed by: COLON & RECTAL SURGERY

## 2020-10-29 RX ORDER — HYDROMORPHONE HYDROCHLORIDE 2 MG/ML
0.2 INJECTION, SOLUTION INTRAMUSCULAR; INTRAVENOUS; SUBCUTANEOUS EVERY 5 MIN PRN
Status: DISCONTINUED | OUTPATIENT
Start: 2020-10-29 | End: 2020-10-29

## 2020-10-29 RX ORDER — NEOSTIGMINE METHYLSULFATE 1 MG/ML
INJECTION, SOLUTION INTRAVENOUS
Status: DISCONTINUED | OUTPATIENT
Start: 2020-10-29 | End: 2020-10-29

## 2020-10-29 RX ORDER — ROCURONIUM BROMIDE 10 MG/ML
INJECTION, SOLUTION INTRAVENOUS
Status: DISCONTINUED | OUTPATIENT
Start: 2020-10-29 | End: 2020-10-29

## 2020-10-29 RX ORDER — BUPIVACAINE HYDROCHLORIDE 2.5 MG/ML
INJECTION, SOLUTION EPIDURAL; INFILTRATION; INTRACAUDAL
Status: DISCONTINUED | OUTPATIENT
Start: 2020-10-29 | End: 2020-10-29 | Stop reason: HOSPADM

## 2020-10-29 RX ORDER — MIDAZOLAM HYDROCHLORIDE 1 MG/ML
INJECTION, SOLUTION INTRAMUSCULAR; INTRAVENOUS
Status: DISCONTINUED | OUTPATIENT
Start: 2020-10-29 | End: 2020-10-29

## 2020-10-29 RX ORDER — SODIUM CHLORIDE 0.9 % (FLUSH) 0.9 %
3 SYRINGE (ML) INJECTION EVERY 8 HOURS
Status: DISCONTINUED | OUTPATIENT
Start: 2020-10-29 | End: 2020-10-29

## 2020-10-29 RX ORDER — ONDANSETRON 2 MG/ML
INJECTION INTRAMUSCULAR; INTRAVENOUS
Status: DISCONTINUED | OUTPATIENT
Start: 2020-10-29 | End: 2020-10-29

## 2020-10-29 RX ORDER — METOCLOPRAMIDE HYDROCHLORIDE 5 MG/ML
10 INJECTION INTRAMUSCULAR; INTRAVENOUS EVERY 10 MIN PRN
Status: DISCONTINUED | OUTPATIENT
Start: 2020-10-29 | End: 2020-10-29

## 2020-10-29 RX ORDER — SODIUM CHLORIDE, SODIUM LACTATE, POTASSIUM CHLORIDE, CALCIUM CHLORIDE 600; 310; 30; 20 MG/100ML; MG/100ML; MG/100ML; MG/100ML
INJECTION, SOLUTION INTRAVENOUS CONTINUOUS PRN
Status: DISCONTINUED | OUTPATIENT
Start: 2020-10-29 | End: 2020-10-29

## 2020-10-29 RX ORDER — FENTANYL CITRATE 50 UG/ML
INJECTION, SOLUTION INTRAMUSCULAR; INTRAVENOUS
Status: DISCONTINUED | OUTPATIENT
Start: 2020-10-29 | End: 2020-10-29

## 2020-10-29 RX ORDER — DIPHENHYDRAMINE HYDROCHLORIDE 50 MG/ML
25 INJECTION INTRAMUSCULAR; INTRAVENOUS EVERY 6 HOURS PRN
Status: DISCONTINUED | OUTPATIENT
Start: 2020-10-29 | End: 2020-10-29

## 2020-10-29 RX ORDER — MEPERIDINE HYDROCHLORIDE 25 MG/ML
12.5 INJECTION INTRAMUSCULAR; INTRAVENOUS; SUBCUTANEOUS ONCE AS NEEDED
Status: DISCONTINUED | OUTPATIENT
Start: 2020-10-29 | End: 2020-10-29

## 2020-10-29 RX ADMIN — SODIUM CHLORIDE 30 MG/ML INHALATION SOLUTION 4 ML: 30 SOLUTION INHALANT at 03:10

## 2020-10-29 RX ADMIN — BACLOFEN 10 MG: 10 TABLET ORAL at 09:10

## 2020-10-29 RX ADMIN — CHOLESTYRAMINE 4 G: 4 POWDER, FOR SUSPENSION ORAL at 08:10

## 2020-10-29 RX ADMIN — FLUCONAZOLE 100 MG: 40 POWDER, FOR SUSPENSION ORAL at 08:10

## 2020-10-29 RX ADMIN — PROPRANOLOL HYDROCHLORIDE 30 MG: 20 SOLUTION ORAL at 09:10

## 2020-10-29 RX ADMIN — CHLORHEXIDINE GLUCONATE 0.12% ORAL RINSE 15 ML: 1.2 LIQUID ORAL at 08:10

## 2020-10-29 RX ADMIN — FENTANYL CITRATE 50 MCG: 50 INJECTION, SOLUTION INTRAMUSCULAR; INTRAVENOUS at 01:10

## 2020-10-29 RX ADMIN — ENOXAPARIN SODIUM 30 MG: 100 INJECTION SUBCUTANEOUS at 04:10

## 2020-10-29 RX ADMIN — CEFEPIME HYDROCHLORIDE 2 G: 2 INJECTION, SOLUTION INTRAVENOUS at 08:10

## 2020-10-29 RX ADMIN — ROCURONIUM BROMIDE 50 MG: 10 INJECTION, SOLUTION INTRAVENOUS at 12:10

## 2020-10-29 RX ADMIN — MORPHINE SULFATE 2 MG: 2 INJECTION, SOLUTION INTRAMUSCULAR; INTRAVENOUS at 05:10

## 2020-10-29 RX ADMIN — CHLORHEXIDINE GLUCONATE 0.12% ORAL RINSE 15 ML: 1.2 LIQUID ORAL at 09:10

## 2020-10-29 RX ADMIN — SODIUM CHLORIDE, SODIUM LACTATE, POTASSIUM CHLORIDE, AND CALCIUM CHLORIDE: 600; 310; 30; 20 INJECTION, SOLUTION INTRAVENOUS at 12:10

## 2020-10-29 RX ADMIN — BUPIVACAINE 266 MG: 13.3 INJECTION, SUSPENSION, LIPOSOMAL INFILTRATION at 01:10

## 2020-10-29 RX ADMIN — MICONAZOLE NITRATE: 2 OINTMENT TOPICAL at 09:10

## 2020-10-29 RX ADMIN — PROPRANOLOL HYDROCHLORIDE 30 MG: 20 SOLUTION ORAL at 08:10

## 2020-10-29 RX ADMIN — CEFEPIME HYDROCHLORIDE 2 G: 2 INJECTION, SOLUTION INTRAVENOUS at 04:10

## 2020-10-29 RX ADMIN — CHOLESTYRAMINE 4 G: 4 POWDER, FOR SUSPENSION ORAL at 09:10

## 2020-10-29 RX ADMIN — GLYCOPYRROLATE 0.8 MG: 0.2 INJECTION, SOLUTION INTRAMUSCULAR; INTRAVENOUS at 01:10

## 2020-10-29 RX ADMIN — PROPRANOLOL HYDROCHLORIDE 30 MG: 20 SOLUTION ORAL at 02:10

## 2020-10-29 RX ADMIN — METRONIDAZOLE 500 MG: 500 TABLET ORAL at 02:10

## 2020-10-29 RX ADMIN — SODIUM CHLORIDE 30 MG/ML INHALATION SOLUTION 4 ML: 30 SOLUTION INHALANT at 07:10

## 2020-10-29 RX ADMIN — METRONIDAZOLE 500 MG: 500 TABLET ORAL at 05:10

## 2020-10-29 RX ADMIN — CEFEPIME HYDROCHLORIDE 2 G: 2 INJECTION, SOLUTION INTRAVENOUS at 12:10

## 2020-10-29 RX ADMIN — BACLOFEN 10 MG: 10 TABLET ORAL at 02:10

## 2020-10-29 RX ADMIN — FAMOTIDINE 20 MG: 10 INJECTION INTRAVENOUS at 09:10

## 2020-10-29 RX ADMIN — ROCURONIUM BROMIDE 10 MG: 10 INJECTION, SOLUTION INTRAVENOUS at 01:10

## 2020-10-29 RX ADMIN — GABAPENTIN 250 MG: 250 SUSPENSION ORAL at 09:10

## 2020-10-29 RX ADMIN — ONDANSETRON 4 MG: 2 INJECTION, SOLUTION INTRAMUSCULAR; INTRAVENOUS at 01:10

## 2020-10-29 RX ADMIN — MIDAZOLAM 2 MG: 1 INJECTION INTRAMUSCULAR; INTRAVENOUS at 12:10

## 2020-10-29 RX ADMIN — MICONAZOLE NITRATE: 2 OINTMENT TOPICAL at 08:10

## 2020-10-29 RX ADMIN — FAMOTIDINE 20 MG: 10 INJECTION INTRAVENOUS at 08:10

## 2020-10-29 RX ADMIN — VANCOMYCIN HYDROCHLORIDE 500 MG: 500 INJECTION, POWDER, LYOPHILIZED, FOR SOLUTION INTRAVENOUS at 03:10

## 2020-10-29 RX ADMIN — SODIUM CHLORIDE 30 MG/ML INHALATION SOLUTION 4 ML: 30 SOLUTION INHALANT at 11:10

## 2020-10-29 RX ADMIN — BACLOFEN 10 MG: 10 TABLET ORAL at 08:10

## 2020-10-29 RX ADMIN — METRONIDAZOLE 500 MG: 500 TABLET ORAL at 09:10

## 2020-10-29 RX ADMIN — PHENOBARBITAL 100 MG: 20 ELIXIR ORAL at 09:10

## 2020-10-29 RX ADMIN — NEOSTIGMINE METHYLSULFATE 5 MG: 1 INJECTION INTRAVENOUS at 01:10

## 2020-10-29 NOTE — SUBJECTIVE & OBJECTIVE
Review of Systems   Unable to perform ROS: Patient nonverbal         Objective:     Vital Signs (Most Recent):  Temp: 98.6 °F (37 °C) (10/29/20 0300)  Pulse: 91 (10/29/20 0847)  Resp: (!) 26 (10/29/20 0718)  BP: 108/73 (10/29/20 0847)  SpO2: 100 % (10/29/20 0718) Vital Signs (24h Range):  Temp:  [97.9 °F (36.6 °C)-99 °F (37.2 °C)] 98.6 °F (37 °C)  Pulse:  [] 91  Resp:  [14-56] 26  SpO2:  [97 %-100 %] 100 %  BP: (102-131)/(60-83) 108/73     Weight: 34.3 kg (75 lb 9.9 oz)  Body mass index is 16.36 kg/m².      Intake/Output Summary (Last 24 hours) at 10/29/2020 1034  Last data filed at 10/29/2020 0600  Gross per 24 hour   Intake 1440 ml   Output 1810 ml   Net -370 ml       Physical Exam  Vitals signs and nursing note reviewed.   Constitutional:       General: He is awake. He is not in acute distress.     Appearance: He is underweight. He is ill-appearing. He is not toxic-appearing or diaphoretic.      Interventions: He is not intubated.  HENT:      Head: Normocephalic and atraumatic.      Mouth/Throat:      Mouth: Mucous membranes are dry.   Eyes:      General: Lids are normal.      Pupils: Pupils are equal, round, and reactive to light.   Neck:      Musculoskeletal: Neck supple.      Vascular: No carotid bruit.      Trachea: Trachea normal.     Cardiovascular:      Rate and Rhythm: Normal rate and regular rhythm.      Pulses:           Radial pulses are 1+ on the right side and 1+ on the left side.        Dorsalis pedis pulses are 1+ on the right side and 1+ on the left side.      Heart sounds: Normal heart sounds.   Pulmonary:      Effort: Pulmonary effort is normal. No tachypnea, accessory muscle usage or respiratory distress. He is not intubated.      Breath sounds: Normal breath sounds.   Chest:      Chest wall: No tenderness.   Abdominal:      General: Bowel sounds are normal. There is distension (mild).      Palpations: Abdomen is soft.      Tenderness: There is no abdominal tenderness.        Genitourinary:     Penis: Normal.       Comments: Iglesias in place  Musculoskeletal:      Right lower leg: No edema.      Left lower leg: No edema.      Right foot: No deformity.      Left foot: No deformity.      Comments: Severe diffuse atrophy with chronic abduction contractures of all 4 extremities.     Lymphadenopathy:      Cervical: No cervical adenopathy.   Skin:     General: Skin is warm and dry.      Capillary Refill: Capillary refill takes less than 2 seconds.      Findings: No rash.      Comments: bilat buttock stg 4 decubiti with wound vac in place   Neurological:      Mental Status: He is alert.      Comments: Eyes open but does not follow commands or track   Psychiatric:         Attention and Perception: He is inattentive.         Vents:  Vent Mode: Spont (10/28/20 0734)  Ventilator Initiated: Yes (10/27/20 0848)  Set Rate: 0 BPM (10/28/20 0734)  Vt Set: 320 mL (10/28/20 0734)  Pressure Support: 10 cmH20 (10/28/20 0734)  PEEP/CPAP: 5 cmH20 (10/28/20 0734)  Oxygen Concentration (%): 28 (10/29/20 0718)  Peak Airway Pressure: 15 cmH2O (10/28/20 0734)  Plateau Pressure: 0 cmH20 (10/28/20 0734)  Total Ve: 7.45 mL (10/28/20 0734)  F/VT Ratio<105 (RSBI): (!) 54.79 (10/28/20 0734)    Lines/Drains/Airways     Peripherally Inserted Central Catheter Line            PICC Double Lumen 10/22/20 1300 6 days          Drain                 Gastrostomy/Enterostomy 08/04/20 1653 Percutaneous endoscopic gastrostomy (PEG) feeding 85 days         Gastrostomy/Enterostomy 10/22/20 1300 LUQ 6 days         Urethral Catheter 10/22/20 1653 Latex 16 Fr. 6 days         Rectal Tube 10/25/20 1400 rectal tube w/ balloon (indicate number of mLs) 3 days          Airway                 Surgical Airway 10/27/20 0855 Shiley Cuffed 2 days                Significant Labs:    CBC/Anemia Profile:  Recent Labs   Lab 10/28/20  0434 10/29/20  0344   WBC 9.29 9.75   HGB 9.1* 8.8*   HCT 29.1* 29.3*   * 439*   MCV 93 94   RDW 14.2 14.1         Chemistries:  Recent Labs   Lab 10/28/20  0434 10/29/20  0344    139   K 3.9 3.9    107   CO2 25 27   BUN 12 14   CREATININE 0.6 0.5   CALCIUM 8.5* 8.8   ALBUMIN 2.0* 2.1*   PROT 6.7 6.7   BILITOT 0.1 0.1   ALKPHOS 162* 155*   ALT 31 24   AST 33 18   MG 2.0 2.2   PHOS 4.4 3.0       All pertinent labs within the past 24 hours have been reviewed.

## 2020-10-29 NOTE — ASSESSMENT & PLAN NOTE
Wound Care  Antibiotics per ID  PICC in place  Possible osteomyelitis   Wound vac placed   General Surgery consult to evaluate for diverting colostomy to prevent fecal contamination of wound.   10/29-   Patient to go to OR for diverting colostomy today

## 2020-10-29 NOTE — SUBJECTIVE & OBJECTIVE
Interval History:    Patient to go to OR for diverting colostomy today    Review of Systems   Unable to perform ROS: Patient nonverbal     Objective:     Vital Signs (Most Recent):  Temp: 98.5 °F (36.9 °C) (10/29/20 0800)  Pulse: 88 (10/29/20 1100)  Resp: 20 (10/29/20 1100)  BP: 121/80 (10/29/20 1100)  SpO2: 100 % (10/29/20 1100) Vital Signs (24h Range):  Temp:  [97.9 °F (36.6 °C)-99 °F (37.2 °C)] 98.5 °F (36.9 °C)  Pulse:  [] 88  Resp:  [14-56] 20  SpO2:  [97 %-100 %] 100 %  BP: (102-131)/(60-86) 121/80     Weight: 34.3 kg (75 lb 9.9 oz)  Body mass index is 16.36 kg/m².    Intake/Output Summary (Last 24 hours) at 10/29/2020 1139  Last data filed at 10/29/2020 0801  Gross per 24 hour   Intake 1500 ml   Output 1730 ml   Net -230 ml      Physical Exam  Constitutional:       General: He is not in acute distress.     Appearance: He is well-developed. He is not diaphoretic.      Comments: Awake , Pt does not communicate.    HENT:      Head: Normocephalic and atraumatic.      Mouth/Throat:      Pharynx: No oropharyngeal exudate.   Eyes:      Conjunctiva/sclera: Conjunctivae normal.      Pupils: Pupils are equal, round, and reactive to light.   Neck:      Musculoskeletal: Normal range of motion and neck supple.      Thyroid: No thyromegaly.      Vascular: No JVD.   Cardiovascular:      Rate and Rhythm: Normal rate and regular rhythm.      Heart sounds: Normal heart sounds. No murmur.   Pulmonary:      Effort: Pulmonary effort is normal. No respiratory distress.      Breath sounds: Normal breath sounds. No wheezing or rales.      Comments: Trach collar in place   Chest:      Chest wall: No tenderness.   Abdominal:      General: Bowel sounds are normal. There is no distension.      Palpations: Abdomen is soft.      Tenderness: There is no abdominal tenderness. There is no guarding or rebound.   Musculoskeletal:      Comments: Wound vac in place kathia ischium    Lymphadenopathy:      Cervical: No cervical adenopathy.    Skin:     General: Skin is warm and dry.      Findings: No rash.   Neurological:      Comments: H/O Cerebral palsy . Awake , does not communicate.          Significant Labs:   BMP:   Recent Labs   Lab 10/29/20  0344   GLU 95      K 3.9      CO2 27   BUN 14   CREATININE 0.5   CALCIUM 8.8   MG 2.2     CBC:   Recent Labs   Lab 10/28/20  0434 10/29/20  0344   WBC 9.29 9.75   HGB 9.1* 8.8*   HCT 29.1* 29.3*   * 439*     CMP:   Recent Labs   Lab 10/28/20  0434 10/29/20  0344    139   K 3.9 3.9    107   CO2 25 27   GLU 95 95   BUN 12 14   CREATININE 0.6 0.5   CALCIUM 8.5* 8.8   PROT 6.7 6.7   ALBUMIN 2.0* 2.1*   BILITOT 0.1 0.1   ALKPHOS 162* 155*   AST 33 18   ALT 31 24   ANIONGAP 8 5*   EGFRNONAA >60 >60       Significant Imaging:

## 2020-10-29 NOTE — ASSESSMENT & PLAN NOTE
Stage IV bilateral decub ulcers with diarrhea and stool contamination    - to OR today for lap vs open colostomy construction  - All risks, benefits and alternatives fully explained to patient. Risks include, but are not limited to, bleeding, infection, anastomotic leak, damage to ureter, damage to other intra-abdominal organs such as colon, rectum, small bowel, stomach, liver, bladder, reproductive organs, sexual dysfunction, urinary dysfunction, postoperative abscess, conversion to open operation, perioperative MI, CVA and death.  All questions field and appropriately answered to patient's mom satisfaction.  Consent signed and placed on chart via phone conent.  - Hold TFs for now. Okay to resume postop  - WCON c/s for marking

## 2020-10-29 NOTE — PROGRESS NOTES
[facility] Hospital Medicine  Progress Note    Patient Name: Glen Moscoso  MRN: [id number]  Patient Class: IP- Inpatient   Admission Date: 10/22/2020  Length of Stay: 7 days  Attending Physician: Travis Bobo MD  Primary Care Provider: Yadi Lawson MD        Subjective:     Principal Problem:Decubitus ulcer of ischial area, left, stage IV        HPI:  Glen Moscoso is a 25 y.o. male patient with a PMHx of cerebral palsy who presents to the Emergency Department for evaluation of respiratory distress which onset PTA. EMS reports pt was found face down on the pillow in nursing home. Pt arrives tachycardic and tachypneic. Patient recently discharge from [facility] where he was treated for respiratory failure and sepsis. Patient was discharged on 10/10/20 to [facility] on IV Vancomycin, Cefepime and Flagyl to treat pelvic osteomyelitis and resolving colitis. In the ED, WBCs 18.7K, Platelet 681, CO2 20, Lactate 2.3. UA negative. CXR revealed mild atelectasis or infiltrate right perihilar region and right midlung zone. Pt with worsening resp failure Subsequently pt was intubated. Sepsis protocol initiated in the ED including IV hydration. Hospital medicine called for admission. Patient placed in ICU. Patient is a full code, SDM mother, Leni Moscoso at [phone].     Overview/Hospital Course:  10/23  Remains intubated , enteral nutrition initiated. Continued treatment of sepsis due to aspiration pneumonia .due to poor air way protection .  Cultures are with negative growth to date. Temp Max 103 and wbc 19.30   Tachycardia was reported over night , follow up Echo EF 50 % normal diastolic function.  Case discussed with Dr Palma who has agreed for trach placement due to recurrent aspiration pneumonia.    10/24    Remains intubated, Tmax of 101.5 .CT Abd/pelvis - right inferior pubic ramus -cannot exclude     Osteomyelitis.      10/25   Self extubated, presently on Room air    Case discussed with  Dr Palma who has agreed for trach placement due to recurrent aspiration pneumonia.  10/26- Afebrile. Remains on RA with satisfactory SpO2. Trach placement planned for tomorrow . Wound vac placed to Rt./Lt ischium stage IV pressure ulcers with full thickness tissue loss with exposed bone. General Surgery consulted  to evaluate pt for diverting colostomy to prevent fecal contamination of wound . Rectal tube in place. Labs- WBC normalize 10.1, Hbg 9.2, Pl 512, creatinine 0.5   10/27- S/P tracheostomy placement today. Remains sedated on mechanical ventilation via  trach . Labs are unremarkable . General Surgery is recommending to continue wound vac and rectal tube for now , diverting colostomy is not suggested at this time.   10/28- Tmax 99. Transition to trach collar . SpO2 100% on O2 at 5 L/min . No distress noted . Per nursing staff liquid stool sips around Rectal tube . Wound vac contaminated with liquid stool . Spoke to Dr. Ortez and he will evaluate pt for diverting colostomy . ID consult obtained and recs are noted. Add Questran powder for loose stool. Labs are unremarkable.   10/29- Afebrile . Trach collar in place. Patient to go to OR for diverting colostomy today. Antibiotic continues.     Interval History:    Patient to go to OR for diverting colostomy today    Review of Systems   Unable to perform ROS: Patient nonverbal     Objective:     Vital Signs (Most Recent):  Temp: 98.5 °F (36.9 °C) (10/29/20 0800)  Pulse: 88 (10/29/20 1100)  Resp: 20 (10/29/20 1100)  BP: 121/80 (10/29/20 1100)  SpO2: 100 % (10/29/20 1100) Vital Signs (24h Range):  Temp:  [97.9 °F (36.6 °C)-99 °F (37.2 °C)] 98.5 °F (36.9 °C)  Pulse:  [] 88  Resp:  [14-56] 20  SpO2:  [97 %-100 %] 100 %  BP: (102-131)/(60-86) 121/80     Weight: 34.3 kg (75 lb 9.9 oz)  Body mass index is 16.36 kg/m².    Intake/Output Summary (Last 24 hours) at 10/29/2020 1139  Last data filed at 10/29/2020 0801  Gross per 24 hour   Intake 1500 ml   Output 1730 ml    Net -230 ml      Physical Exam  Constitutional:       General: He is not in acute distress.     Appearance: He is well-developed. He is not diaphoretic.      Comments: Awake , Pt does not communicate.    HENT:      Head: Normocephalic and atraumatic.      Mouth/Throat:      Pharynx: No oropharyngeal exudate.   Eyes:      Conjunctiva/sclera: Conjunctivae normal.      Pupils: Pupils are equal, round, and reactive to light.   Neck:      Musculoskeletal: Normal range of motion and neck supple.      Thyroid: No thyromegaly.      Vascular: No JVD.   Cardiovascular:      Rate and Rhythm: Normal rate and regular rhythm.      Heart sounds: Normal heart sounds. No murmur.   Pulmonary:      Effort: Pulmonary effort is normal. No respiratory distress.      Breath sounds: Normal breath sounds. No wheezing or rales.      Comments: Trach collar in place   Chest:      Chest wall: No tenderness.   Abdominal:      General: Bowel sounds are normal. There is no distension.      Palpations: Abdomen is soft.      Tenderness: There is no abdominal tenderness. There is no guarding or rebound.   Musculoskeletal:      Comments: Wound vac in place kathia ischium    Lymphadenopathy:      Cervical: No cervical adenopathy.   Skin:     General: Skin is warm and dry.      Findings: No rash.   Neurological:      Comments: H/O Cerebral palsy . Awake , does not communicate.          Significant Labs:   BMP:   Recent Labs   Lab 10/29/20  0344   GLU 95      K 3.9      CO2 27   BUN 14   CREATININE 0.5   CALCIUM 8.8   MG 2.2     CBC:   Recent Labs   Lab 10/28/20  0434 10/29/20  0344   WBC 9.29 9.75   HGB 9.1* 8.8*   HCT 29.1* 29.3*   * 439*     CMP:   Recent Labs   Lab 10/28/20  0434 10/29/20  0344    139   K 3.9 3.9    107   CO2 25 27   GLU 95 95   BUN 12 14   CREATININE 0.6 0.5   CALCIUM 8.5* 8.8   PROT 6.7 6.7   ALBUMIN 2.0* 2.1*   BILITOT 0.1 0.1   ALKPHOS 162* 155*   AST 33 18   ALT 31 24   ANIONGAP 8 5*   EGFRNONAA >60  >60       Significant Imaging:       Assessment/Plan:      * Decubitus ulcer of ischial area, left, stage IV  Wound Care  Antibiotics per ID  PICC in place  Possible osteomyelitis   Wound vac placed   General Surgery consult to evaluate for diverting colostomy to prevent fecal contamination of wound.   10/29-   Patient to go to OR for diverting colostomy today      Osteomyelitis of pelvis, presumed   ID recommended 6 weeks therapy  with Vancomycin, Cefepime and Flagyl  -EOC -12/09/2020      Right lower lobe pneumonia  Recurrent Aspiration Pneumonia  S/P Trach placement 10/27/20  PEG tube for feeding and medication administration   Continue antibiotic for 5 to 7 days   ID consult obtained . Antibiotic extended x 6 weeks for presumed pelvic osteomyelitis     Status post tracheostomy  -Trach placed and transition to trach collar       Tachycardia  Due to infection  ABX  Symptomatic care  Monitor  Currently controlled with BB         Severe protein-calorie malnutrition  Enteral tube feeds   Continue supplements        Anemia, unspecified  - Anemia of chronic disease   -Monitor H/H and signs of active bleed.  -Transfuse P-RBC for Hgb 7 or under       Seizure disorder  Phenobarbital  Neuro checks    Cerebral palsy  Supportive care       VTE Risk Mitigation (From admission, onward)         Ordered     enoxaparin injection 30 mg  Every 24 hours      10/27/20 1517     IP VTE HIGH RISK PATIENT  Once      10/22/20 1825     Place sequential compression device  Until discontinued      10/22/20 1754                Discharge Planning   ROCOÍ:      Code Status: Full Code   Is the patient medically ready for discharge?:     Reason for patient still in hospital (select all that apply): Patient trending condition  Discharge Plan A: Long-term acute care facility (LTAC)   Discharge Delays: (!) Change in Medical Condition        Critical care time spent on the evaluation and treatment of severe organ dysfunction, review of pertinent labs  and imaging studies, discussions with consulting providers and discussions with patient/family: 30  minutes.      Travis Bobo MD  Department of Hospital Medicine   [facility]

## 2020-10-29 NOTE — NURSING
Patient returned from OR in bed, on monitor, and oxygen at 1423, escorted by OR staff.  Bedside handoff completed, VSS stable, patient asleep.  Mother updated over the phone.      Dr Ortez notified patient has PO antibiotics and Propranolol due.  Per MD okay to give meds, and to restart tube feeds 6 hours post op.

## 2020-10-29 NOTE — ANESTHESIA POSTPROCEDURE EVALUATION
Anesthesia Post Evaluation    Patient: Glen Moscoso    Procedure(s) Performed: Procedure(s) (LRB):  CREATION, COLOSTOMY, LAPAROSCOPIC (N/A)    Final Anesthesia Type: general    Patient location during evaluation: ICU  Patient participation: No - Unable to Participate, Sedation  Level of consciousness: sedated  Post-procedure vital signs: reviewed and stable  Pain management: adequate  Airway patency: patent    PONV status at discharge: No PONV  Anesthetic complications: no      Cardiovascular status: blood pressure returned to baseline and hemodynamically stable  Respiratory status: unassisted, spontaneous ventilation and T-piece  Hydration status: euvolemic  Follow-up not needed.          Vitals Value Taken Time   /74 10/29/20 1202   Temp 36.7 °C (98.1 °F) 10/29/20 1200   Pulse 94 10/29/20 1238   Resp 26 10/29/20 1238   SpO2 99 % 10/29/20 1238   Vitals shown include unvalidated device data.      No case tracking events are documented in the log.      Pain/Lulú Score: Pain Rating Prior to Med Admin: 7 (10/28/2020  3:30 AM)  Pain Rating Post Med Admin: 0 (10/28/2020  4:00 AM)

## 2020-10-29 NOTE — SUBJECTIVE & OBJECTIVE
Interval History: No acute events overnight.    Medications:  Continuous Infusions:  Scheduled Meds:   baclofen  10 mg Per G Tube TID    ceFEPime (MAXIPIME) IVPB  2 g Intravenous Q8H    chlorhexidine  15 mL Mouth/Throat BID    cholestyramine  1 packet Per G Tube BID    enoxaparin  30 mg Subcutaneous Q24H    famotidine (PF)  20 mg Intravenous BID    fluconazole 40 mg/ml  100 mg Per G Tube Daily    gabapentin  250 mg Per G Tube QHS    metroNIDAZOLE  500 mg Per G Tube Q8H    miconazole nitrate 2%   Topical (Top) BID    PHENobarbitaL  100 mg Per G Tube QHS    propranoloL  30 mg Per G Tube TID    sodium chloride 3%  4 mL Nebulization Q8H     PRN Meds:acetaminophen, albuterol-ipratropium, bisacodyL, influenza, morphine, pneumoc 13-robby conj-dip cr(PF), vancomycin - pharmacy to dose     Review of patient's allergies indicates:  No Known Allergies  Objective:     Vital Signs (Most Recent):  Temp: 98.5 °F (36.9 °C) (10/29/20 0800)  Pulse: 88 (10/29/20 1100)  Resp: 20 (10/29/20 1100)  BP: 121/80 (10/29/20 1100)  SpO2: 100 % (10/29/20 1100) Vital Signs (24h Range):  Temp:  [97.9 °F (36.6 °C)-99 °F (37.2 °C)] 98.5 °F (36.9 °C)  Pulse:  [] 88  Resp:  [14-56] 20  SpO2:  [97 %-100 %] 100 %  BP: (102-131)/(60-86) 121/80     Weight: 34.3 kg (75 lb 9.9 oz)  Body mass index is 16.36 kg/m².    Intake/Output - Last 3 Shifts       10/27 0700 - 10/28 0659 10/28 0700 - 10/29 0659 10/29 0700 - 10/30 0659    P.O.  120     I.V. (mL/kg) 600 (17.5)      NG/GT 1125 1640 50    IV Piggyback 400 250 50    Total Intake(mL/kg) 2125 (62) 2010 (58.6) 100 (2.9)    Urine (mL/kg/hr) 1400 (1.7) 1735 (2.1) 50 (0.3)    Other 35 65     Stool 0 250 50    Total Output 1435 2050 100    Net +690 -40 0           Stool Occurrence  2 x 1 x          Physical Exam  Constitutional:       Appearance: He is well-developed.   HENT:      Head: Atraumatic.   Eyes:      Conjunctiva/sclera: Conjunctivae normal.   Neck:      Thyroid: No thyromegaly.    Cardiovascular:      Rate and Rhythm: Regular rhythm.   Pulmonary:      Comments: Trach in place  Abdominal:      General: There is no distension.      Palpations: Abdomen is soft. There is no mass.      Comments: Gtube in place in LUQ; well-healed transverse incision in Left-mid abdomen   Musculoskeletal:         General: No tenderness.   Skin:     General: Skin is warm and dry.      Capillary Refill: Capillary refill takes less than 2 seconds.      Comments: +bilateral decubitus posterior ulcers with wound vacs in place with good seal/sxn   Neurological:      Mental Status: He is alert. Mental status is at baseline.         Significant Labs:  CBC:   Recent Labs   Lab 10/29/20  0344   WBC 9.75   RBC 3.13*   HGB 8.8*   HCT 29.3*   *   MCV 94   MCH 28.1   MCHC 30.0*     BMP:   Recent Labs   Lab 10/29/20  0344   GLU 95      K 3.9      CO2 27   BUN 14   CREATININE 0.5   CALCIUM 8.8   MG 2.2

## 2020-10-29 NOTE — NURSING
Patient's mother updated multiple times throughout the shift.  Most recently she called and expressed concern regarding patient going back to [facility] in [place].  Speech was altered, rapid, and difficult to understand.  She was also having trouble retaining information.  Mrs Moscoso was appropriate today until most recent call.  She expressed concern about patient going back to [facility].  She said he can go back there if he gets a bigger room that doesn't have a bed on top of an air conditioner, and is away from Covid patients.  She said she preferred he go to [facility].  Staff have discussed [facility] and case management has discussed patient going to [facility] multiple times.  Case management updated and will follow up.      Mrs Moscoso also said she cannot come to visit Tuesday as she has to pay bills and will be back to visit Wednesday.

## 2020-10-29 NOTE — PLAN OF CARE
Updates sent to [facility] per facility request.  Plan for DC to LTAC once colostomy output begins. Will F/U with [facility] for acceptance and bed availability.  Returned call to [facility] and left contact information.  Pt has not been accepted to any other LTAC in closer proximity to home.  Leni, mother, is aware and in agreement for pt to return to [facility].    10/29/20 7085   Discharge Reassessment   Assessment Type Discharge Planning Reassessment   Provided patient/caregiver education on the expected discharge date and the discharge plan Yes   Do you have any problems affording any of your prescribed medications? No   Discharge Plan A Long-term acute care facility (LTAC)   DME Needed Upon Discharge  none   Patient choice form signed by patient/caregiver N/A   Anticipated Discharge Disposition LTAC   Can the patient/caregiver answer the patient profile reliably? No, cognitively impaired   How does the patient rate their overall health at the present time?   (NICOLA)   Describe the patient's ability to walk at the present time. Does not walk or unable to take any steps at all   How often would a person be available to care for the patient? Whenever needed   Number of comorbid conditions (as recorded on the chart) Five or more   During the past month, has the patient often been bothered by feeling down, depressed or hopeless?   (NICOLA)   During the past month, has the patient often been bothered by little interest or pleasure in doing things?   (NICOLA)   Post-Acute Status   Post-Acute Authorization Placement   Post-Acute Placement Status Awaiting Internal Medical Clearance   Part D Coverage NA   Discharge Delays (!) Other  (colostomy being placed and will need output prior to DC to LTAC)

## 2020-10-29 NOTE — OP NOTE
[facility]  Surgery Department  Operative Note    SUMMARY     Date of Procedure: 10/29/2020     Procedure: Procedure(s) (LRB):  CREATION, COLOSTOMY, LAPAROSCOPIC (N/A)     Surgeon(s) and Role:     * Michael Ortez MD - Primary    Assisting Surgeon: None    Pre-Operative Diagnosis: Pressure injury of skin of left buttock, unspecified injury stage [L89.329]  Sepsis, due to unspecified organism, unspecified whether acute organ dysfunction present [A41.9]    Post-Operative Diagnosis: Post-Op Diagnosis Codes:     * Pressure injury of skin of left buttock, unspecified injury stage [L89.329]     * Sepsis, due to unspecified organism, unspecified whether acute organ dysfunction present [A41.9]    Anesthesia: General    Technical Procedures Used:  Laparoscopic sigmoid loop colostomy construction    Indications for Procedure:  25 year old male with known cerebral palsy and bilateral decubitus ulcers who requires fecal diversion to allow for wound healing who presents for definitive management    Findings of the Procedure:  Adhesions in the upper abdomen of the small intestine and stomach to the anterior abdominal wall with a very redundant sigmoid colon amenable to loop colostomy construction    Description of the Procedure:  Patient was brought to the operating room placed supine on table.  General tracheal anesthesia was then induced.  Iglesias catheter was already in place from the ICU.  The abdomen was then prepped and draped usual sterile fashion.  Preoperative surgical time-out was then performed confirming the correct patient, procedure and preop medications given.  A 5 mm infraumbilical incision was made and a Veress needle was inserted through the fascia but cannot be confirmed good position.  Two attempts were made but this was then aborted due to the inability confirming good position.  A left upper quadrant stab incision was made beneath the costal margin above the previous incisions and G-tube.   Veress needle was inserted to abdominal cavity and confirmed good position with aspiration and saline drop test.  The abdomen was insufflated to pressure 15 mm of mercury.  A 5 mm laparoscopic trocar was then inserted through the umbilical incision via Optiview technique in usual fashion.  Once confirmed in good position, the camera was introduced and there was no signs of injury upon entry.  The Veress needle was visualized and was removed under direct visualization.  The abdomen was checked at and there were found to be adhesions in the left upper quadrant of the stomach and small intestine to the previous surgical areas.  The sigmoid colon was identified and additional 5 mm trocar was placed in the right lower quadrant.  The sigmoid colon was found to be very redundant and was amenable to a loop colostomy construction without further mobilization given the redundancy and the very thin abdominal wall.    The pre chosen left lower quadrant ostomy site was evaluated and a circular skin ring was excised.  The fascia was sharply incised and the rectus muscle was spread, and the posterior fascia and peritoneum were then incised using electrocautery under direct laparoscopic visualization.  A [place] was used to grasp the sigmoid colon from the laparoscopic grasper and was brought up through the defect easily without tension.  The abdomen was insufflated and the colon was found to be straight without twisting with the proximal end superior and the distal and inferior.  Once this was complete the abdomen was checked and again hemostasis was confirmed.  The abdomen was then desufflated and the remaining ports were removed.  The incision sites were then closed with 4-0 Monocryl suture.  Skin glue was applied.  Blue towels were then placed over the rest of the abdomen except for the ostomy site to protect the rest of the abdominal incisions.  The sigmoid colon was then opened transversely 3 4 stool way of what was  protruding.  A standard Lucy colostomy was then constructed using 4 separate 3-0 Vicryl sutures in a [place] fashion.  Once these were tied, interrupted 3-0 Vicryl sutures were used to approximate the mucocutaneous junction circumferentially in usual fashion.  Ostomy appliance was applied.  The patient was then woken from general anesthesia and taken to the postanesthesia care unit/intensive care unit in the stable condition.  He tolerated procedure well.  All sponge, needle and instrument counts were correct at the end of the case.    Significant Surgical Tasks Conducted by the Assistant(s), if Applicable: N/A    Complications: No    Estimated Blood Loss (EBL): 5 mL           Implants: * No implants in log *    Specimens:   Specimen (12h ago, onward)    None                  Condition: Good    Disposition: PACU/ICU - hemodynamically stable    Attestation: I performed the procedure.

## 2020-10-29 NOTE — PROGRESS NOTES
Pharmacokinetic Assessment Follow Up: IV Vancomycin    Vancomycin serum concentration assessment(s):    The random level was drawn incorrectly and cannot be used to guide therapy at this time.    Vancomycin Regimen Plan:    Re-dose when the random level is less than 20 mcg/mL, next level to be drawn at 1130 on 10/29.     Drug levels (last 3 results):  Recent Labs   Lab Result Units 10/26/20  1659 10/28/20  0017 10/28/20  0434 10/28/20  2001 10/29/20  0344   Vancomycin, Random ug/mL  --   --  21.3 15.7 23.0   Vancomycin-Trough ug/mL 21.8 25.8*  --   --   --        Pharmacy will continue to follow and monitor vancomycin.    Please contact pharmacy at extension [phone] for questions regarding this assessment.    Thank you for the consult,   Parish Zimmerman, PharmD Candidate 2021        Patient brief summary:  Glen Moscoso is a 25 y.o. male initiated on antimicrobial therapy with IV Vancomycin for treatment of stage IV decubitus ulcer, recurrent aspiration pneumonia, presumed osteomyelitis.     The patient's current regimen is pulse dosing (dosing by level) when random level is less than 20 mcg/mL.     Drug Allergies:   Review of patient's allergies indicates:  No Known Allergies    Actual Body Weight:   34.3 kg     Renal Function:   Estimated Creatinine Clearance: 111.5 mL/min (based on SCr of 0.5 mg/dL).,     Dialysis Method (if applicable):  N/A    CBC (last 72 hours):  Recent Labs   Lab Result Units 10/27/20  0430 10/28/20  0434 10/29/20  0344   WBC K/uL 10.67 9.29 9.75   Hemoglobin g/dL 9.6* 9.1* 8.8*   Hematocrit % 31.4* 29.1* 29.3*   Platelets K/uL 549* 489* 439*   Gran % % 75.7* 66.4 70.1   Lymph % % 13.3* 18.5 14.6*   Mono % % 9.8 13.2 11.8   Eosinophil % % 0.1 0.1 0.0   Basophil % % 0.3 0.2 0.2   Differential Method  Automated Automated Automated       Metabolic Panel (last 72 hours):  Recent Labs   Lab Result Units 10/27/20  0430 10/28/20  0434 10/29/20  0344   Sodium mmol/L 139 137 139   Potassium  mmol/L 4.0 3.9 3.9   Chloride mmol/L 104 104 107   CO2 mmol/L 27 25 27   Glucose mg/dL 98 95 95   BUN mg/dL 7 12 14   Creatinine mg/dL 0.5 0.6 0.5   Albumin g/dL 2.1* 2.0* 2.1*   Total Bilirubin mg/dL 0.1 0.1 0.1   Alkaline Phosphatase U/L 108 162* 155*   AST U/L 18 33 18   ALT U/L 15 31 24   Magnesium mg/dL 2.1 2.0 2.2   Phosphorus mg/dL 4.0 4.4 3.0       Vancomycin Administrations:  vancomycin given in the last 96 hours                   vancomycin 500 mg in dextrose 5 % 100 mL IVPB (ready to mix system) (mg) 500 mg New Bag 10/28/20 2256    vancomycin 500 mg in dextrose 5 % 100 mL IVPB (ready to mix system) (mg) 500 mg New Bag 10/28/20 0806    vancomycin 750 mg in dextrose 5 % 250 mL IVPB (ready to mix system) (mg) 750 mg New Bag 10/27/20 1336     750 mg New Bag  0108    vancomycin in dextrose 5 % 1 gram/250 mL IVPB 1,000 mg (mg) 1,000 mg New Bag 10/26/20 0448     1,000 mg New Bag 10/25/20 1719                Microbiologic Results:  Microbiology Results (last 7 days)     Procedure Component Value Units Date/Time    Blood culture x two cultures. Draw prior to antibiotics. [[id number]] Collected: 10/22/20 1545    Order Status: Completed Specimen: Blood from Peripheral, Upper Arm, Right Updated: 10/27/20 2312     Blood Culture, Routine No growth after 5 days.    Narrative:      Aerobic and anaerobic    Blood culture x two cultures. Draw prior to antibiotics. [[id number]] Collected: 10/22/20 1530    Order Status: Completed Specimen: Blood from Peripheral, Upper Arm, Right Updated: 10/27/20 2312     Blood Culture, Routine No growth after 5 days.    Narrative:      Aerobic and anaerobic    Culture, Respiratory with Gram Stain [[id number]]     Order Status: Canceled Specimen: Respiratory from Endotracheal Aspirate         Thank you for allowing us to participate in this patient's care.     Parish Zimmerman PharmD Candidate 2021 10/29/2020 11:43 AM  Anjelica Duke PharmD 10/29/2020 11:46 AM

## 2020-10-29 NOTE — PROGRESS NOTES
[facility] -   Critical Care Medicine  Progress Note    Patient Name: Glen Moscoso  MRN: [id number]  Admission Date: 10/22/2020  Hospital Length of Stay: 7 days  Code Status: Full Code  Attending Provider: Travis Bobo MD  Primary Care Provider: Yadi Lawson MD   Principal Problem: Decubitus ulcer of ischial area, left, stage IV    Subjective:     HPI:  25 year old male with spastic cerebral palsy, severe contractures, seizure disorder, malnutrition  Presented to ED from LTAC when found face down, unresponsive, with hypoxia  OF NOTE - recent hospitalizations x 2 for pneumonia (serratia), second hospitalization complicated with osteomyelitis from sacral decubitus (underwent surgical I&D) and pneumoperitoneum from suspected colonic perforation that was managed conservatively    On arrival today respiratory rate 50, , sat 89%, SBP 170s, and fever 102  Intubated on ED arrival with anesthesia assistance  Labs revealed leukocytosis, lactic acid 2.3,and procalcitonin 0.12  CT chest abd pelvis revealed - Right lower lobe probable pneumonia.  Osseous erosion could appear similarly but is less favored.  Additional scattered areas of consolidation in the right lung likely also related to infection.  There is a large decubitus ulcer right greater than left. The right ulcer tracks to the posterior aspect of the right inferior pubic ramus. Osteomyelitis cannot be excluded.     Hospital/ICU Course:  Arrived to ICU with OETT to mechanical ventilation, propofol sedation  10/23 - remains intubated on mechanical ventilation; oxygen demand decreased; large amount of thick secretions from trach and oral pharynx  10/24 - Intubated, improved aeration of lung  10/25 - self extubated yesterday; self removed midline overnight  10/26 - weaned to room air; continue drainage from wounds and frequent liquid stool contamination required flexiseal system and wound vac application  10/27 - seen and examined on return from OR  tracheostomy placement; remains sedated on mechanical ventilation via new #8 shiley trach with obturator at head of bed  10/28 - seen and examined on mechanical ventilation and again after transition to trach collar; wound vac contaminated with liquid stool overnight s/t fecal management system leakage, vac changed by WOC today  10/29 - semi-erect in bed eyes open and awake and alert not following commands suspect at baseline.  On blow by O2 to Trach in no distress with VSS awaiting OR for diverting colostomy and TF held.      Review of Systems   Unable to perform ROS: Patient nonverbal         Objective:     Vital Signs (Most Recent):  Temp: 98.6 °F (37 °C) (10/29/20 0300)  Pulse: 91 (10/29/20 0847)  Resp: (!) 26 (10/29/20 0718)  BP: 108/73 (10/29/20 0847)  SpO2: 100 % (10/29/20 0718) Vital Signs (24h Range):  Temp:  [97.9 °F (36.6 °C)-99 °F (37.2 °C)] 98.6 °F (37 °C)  Pulse:  [] 91  Resp:  [14-56] 26  SpO2:  [97 %-100 %] 100 %  BP: (102-131)/(60-83) 108/73     Weight: 34.3 kg (75 lb 9.9 oz)  Body mass index is 16.36 kg/m².      Intake/Output Summary (Last 24 hours) at 10/29/2020 1034  Last data filed at 10/29/2020 0600  Gross per 24 hour   Intake 1440 ml   Output 1810 ml   Net -370 ml       Physical Exam  Vitals signs and nursing note reviewed.   Constitutional:       General: He is awake. He is not in acute distress.     Appearance: He is underweight. He is ill-appearing. He is not toxic-appearing or diaphoretic.      Interventions: He is not intubated.  HENT:      Head: Normocephalic and atraumatic.      Mouth/Throat:      Mouth: Mucous membranes are dry.   Eyes:      General: Lids are normal.      Pupils: Pupils are equal, round, and reactive to light.   Neck:      Musculoskeletal: Neck supple.      Vascular: No carotid bruit.      Trachea: Trachea normal.     Cardiovascular:      Rate and Rhythm: Normal rate and regular rhythm.      Pulses:           Radial pulses are 1+ on the right side and 1+ on the  left side.        Dorsalis pedis pulses are 1+ on the right side and 1+ on the left side.      Heart sounds: Normal heart sounds.   Pulmonary:      Effort: Pulmonary effort is normal. No tachypnea, accessory muscle usage or respiratory distress. He is not intubated.      Breath sounds: Normal breath sounds.   Chest:      Chest wall: No tenderness.   Abdominal:      General: Bowel sounds are normal. There is distension (mild).      Palpations: Abdomen is soft.      Tenderness: There is no abdominal tenderness.       Genitourinary:     Penis: Normal.       Comments: Iglesias in place  Musculoskeletal:      Right lower leg: No edema.      Left lower leg: No edema.      Right foot: No deformity.      Left foot: No deformity.      Comments: Severe diffuse atrophy with chronic abduction contractures of all 4 extremities.     Lymphadenopathy:      Cervical: No cervical adenopathy.   Skin:     General: Skin is warm and dry.      Capillary Refill: Capillary refill takes less than 2 seconds.      Findings: No rash.      Comments: bilat buttock stg 4 decubiti with wound vac in place   Neurological:      Mental Status: He is alert.      Comments: Eyes open but does not follow commands or track   Psychiatric:         Attention and Perception: He is inattentive.         Vents:  Vent Mode: Spont (10/28/20 0734)  Ventilator Initiated: Yes (10/27/20 0848)  Set Rate: 0 BPM (10/28/20 0734)  Vt Set: 320 mL (10/28/20 0734)  Pressure Support: 10 cmH20 (10/28/20 0734)  PEEP/CPAP: 5 cmH20 (10/28/20 0734)  Oxygen Concentration (%): 28 (10/29/20 0718)  Peak Airway Pressure: 15 cmH2O (10/28/20 0734)  Plateau Pressure: 0 cmH20 (10/28/20 0734)  Total Ve: 7.45 mL (10/28/20 0734)  F/VT Ratio<105 (RSBI): (!) 54.79 (10/28/20 0734)    Lines/Drains/Airways     Peripherally Inserted Central Catheter Line            PICC Double Lumen 10/22/20 1300 6 days          Drain                 Gastrostomy/Enterostomy 08/04/20 1653 Percutaneous endoscopic  gastrostomy (PEG) feeding 85 days         Gastrostomy/Enterostomy 10/22/20 1300 LUQ 6 days         Urethral Catheter 10/22/20 1653 Latex 16 Fr. 6 days         Rectal Tube 10/25/20 1400 rectal tube w/ balloon (indicate number of mLs) 3 days          Airway                 Surgical Airway 10/27/20 0855 Shiley Cuffed 2 days                Significant Labs:    CBC/Anemia Profile:  Recent Labs   Lab 10/28/20  0434 10/29/20  0344   WBC 9.29 9.75   HGB 9.1* 8.8*   HCT 29.1* 29.3*   * 439*   MCV 93 94   RDW 14.2 14.1        Chemistries:  Recent Labs   Lab 10/28/20  0434 10/29/20  0344    139   K 3.9 3.9    107   CO2 25 27   BUN 12 14   CREATININE 0.6 0.5   CALCIUM 8.5* 8.8   ALBUMIN 2.0* 2.1*   PROT 6.7 6.7   BILITOT 0.1 0.1   ALKPHOS 162* 155*   ALT 31 24   AST 33 18   MG 2.0 2.2   PHOS 4.4 3.0       All pertinent labs within the past 24 hours have been reviewed.        ABG  Recent Labs   Lab 10/24/20  0451   PH 7.398   PO2 140*   PCO2 40.4   HCO3 24.9   BE 0     Assessment/Plan:     Neuro  Seizure disorder  Continue home dose phenobarbital    Cerebral palsy  continue baclofen, neurontin    ENT  Status post tracheostomy  Post op care  Routine trach care  Family will need teaching on trach care and home medical equipment arranged once ready to transition home from other medical issues  --per ENT, ok to remove sutures after 3d; ok for cuff deflation/change at that point but would avoid cuff deflation - needed for aspiration protection    Derm  * Decubitus ulcer of ischial area, left, stage IV  Wound vac in place  Cont wound care  Plan diverting colostomy today  ID following: Will plan to treat with Vancomycin, Cefepime and Flagyl for 6 weeks -EOC -12/09/2020    Pulmonary  Right lower lobe pneumonia  Recurrent aspiration PNA  S/P Trach now  Cont IVAB for Osteo  Aspiration precautions    Cardiac/Vascular  Tachycardia  Suspect baseline sinus tachycardia exacerbated by fever, sepsis  Holding enteral propanolol  given marginal BP  Optimize analgesia, sedation  10/23 - resume home dose propanolol    ID  Osteomyelitis of pelvis, presumed   Transferred recently to LTAC on 10/10 with plan for IV abx and recs to continue until 10/22  Plan return to LTAC next 48 hours with wound care, wound vac and IVAB  ID recs: Will plan to treat with Vancomycin, Cefepime and Flagyl for 6 weeks -EOC -12/09/2020      Oncology  Anemia, unspecified  Conservative transfusion protocol  No active bleeding  Type and screen pre op  CBC in AM    Endocrine  Severe protein-calorie malnutrition  Resume TF post op per RD recs       Preventive Measures and Monitoring:   Stress Ulcer: Pepcid  Nutrition: TF  Glucose control: stable  Bowel prophylaxis: Colostomy today  DVT prophylaxis: LMWH/SCDs  Hx CAD on B-Blocker: no hx CAD  Head of Bed/Reposition: Elevate HOB and turn Q1-2 hours   Early Mobility: bed bound  Trach Day: #3  PEG Day: chronic  Central Line right SC Day: #8  Iglesias Day:  #8  IVAB Day: #8  Code Status: Full  Pneumonia Vaccine: ordered  Flu Vaccine: ordered    Counseling/Consultation:I have discussed the care of this patient in detail with the bedside nursing staff and Dr. Block and Dr. Jihan Lawson NP  Critical Care Medicine  [facility] -

## 2020-10-29 NOTE — ASSESSMENT & PLAN NOTE
Wound vac in place  Cont wound care  Plan diverting colostomy today  ID following: Will plan to treat with Vancomycin, Cefepime and Flagyl for 6 weeks -EOC -12/09/2020

## 2020-10-29 NOTE — PROGRESS NOTES
Pharmacokinetic Assessment Follow Up: IV Vancomycin    Vancomycin serum concentration assessment(s):    The random level was drawn correctly and can be used to guide therapy at this time. The measurement is within the desired definitive target range of 15 to 20 mcg/mL.    Vancomycin Regimen Plan:    Patient will receive a one time dose of Vancomycin 500 mg tonight at 2200.  Re-dose when the random level is less than 20 mcg/mL, next level to be drawn at 0700 on 10/29    Drug levels (last 3 results):  Recent Labs   Lab Result Units 10/26/20  1659 10/28/20  0017 10/28/20  0434 10/28/20  2001   Vancomycin, Random ug/mL  --   --  21.3 15.7   Vancomycin-Trough ug/mL 21.8 25.8*  --   --        Pharmacy will continue to follow and monitor vancomycin.    Please contact pharmacy at extension [phone] for questions regarding this assessment.    Thank you for the consult,   Martin Burt       Patient brief summary:  Glen Moscoso is a 25 y.o. male initiated on antimicrobial therapy with IV Vancomycin for treatment of bone/joint infection, stage IV decubitus, recurrent aspiration PNA    The patient's current regimen is Pulse dose    Drug Allergies:   Review of patient's allergies indicates:  No Known Allergies    Actual Body Weight:   34.3 kg    Renal Function:   Estimated Creatinine Clearance: 92.9 mL/min (based on SCr of 0.6 mg/dL).,     Dialysis Method (if applicable):  N/A    CBC (last 72 hours):  Recent Labs   Lab Result Units 10/26/20  0334 10/27/20  0430 10/28/20  0434   WBC K/uL 10.14 10.67 9.29   Hemoglobin g/dL 9.2* 9.6* 9.1*   Hematocrit % 29.4* 31.4* 29.1*   Platelets K/uL 512* 549* 489*   Gran % % 70.7 75.7* 66.4   Lymph % % 12.9* 13.3* 18.5   Mono % % 10.5 9.8 13.2   Eosinophil % % 5.0 0.1 0.1   Basophil % % 0.2 0.3 0.2   Differential Method  Automated Automated Automated       Metabolic Panel (last 72 hours):  Recent Labs   Lab Result Units 10/26/20  0334 10/27/20  0430 10/28/20  0434   Sodium mmol/L 141 139 137    Potassium mmol/L 3.5 4.0 3.9   Chloride mmol/L 106 104 104   CO2 mmol/L 29 27 25   Glucose mg/dL 98 98 95   BUN mg/dL 3* 7 12   Creatinine mg/dL 0.5 0.5 0.6   Albumin g/dL 1.9* 2.1* 2.0*   Total Bilirubin mg/dL 0.1 0.1 0.1   Alkaline Phosphatase U/L 108 108 162*   AST U/L 15 18 33   ALT U/L 11 15 31   Magnesium mg/dL 2.6 2.1 2.0   Phosphorus mg/dL 2.8 4.0 4.4       Vancomycin Administrations:  vancomycin given in the last 96 hours                     vancomycin 500 mg in dextrose 5 % 100 mL IVPB (ready to mix system) (mg) 500 mg New Bag 10/28/20 0806    vancomycin 750 mg in dextrose 5 % 250 mL IVPB (ready to mix system) (mg) 750 mg New Bag 10/27/20 1336     750 mg New Bag  0108    vancomycin in dextrose 5 % 1 gram/250 mL IVPB 1,000 mg (mg) 1,000 mg New Bag 10/26/20 0448     1,000 mg New Bag 10/25/20 1719     1,000 mg New Bag  0543                    Microbiologic Results:  Microbiology Results (last 7 days)       Procedure Component Value Units Date/Time    Blood culture x two cultures. Draw prior to antibiotics. [[id number]] Collected: 10/22/20 1545    Order Status: Completed Specimen: Blood from Peripheral, Upper Arm, Right Updated: 10/27/20 2312     Blood Culture, Routine No growth after 5 days.    Narrative:      Aerobic and anaerobic    Blood culture x two cultures. Draw prior to antibiotics. [[id number]] Collected: 10/22/20 1530    Order Status: Completed Specimen: Blood from Peripheral, Upper Arm, Right Updated: 10/27/20 2312     Blood Culture, Routine No growth after 5 days.    Narrative:      Aerobic and anaerobic    Culture, Respiratory with Gram Stain [[id number]]     Order Status: No result Specimen: Respiratory from Endotracheal Aspirate

## 2020-10-29 NOTE — TRANSFER OF CARE
"Anesthesia Transfer of Care Note    Patient: Glen Moscoso    Procedure(s) Performed: Procedure(s) (LRB):  CREATION, COLOSTOMY, LAPAROSCOPIC (N/A)    Patient location: ICU    Anesthesia Type: general    Transport from OR: Transported from OR intubated on 100% O2 by AMBU with assisted ventilation. Continuous ECG monitoring in transport. Continuous SpO2 monitoring in transport    Post pain: adequate analgesia    Post assessment: no apparent anesthetic complications and tolerated procedure well    Post vital signs: stable    Level of consciousness: sedated    Nausea/Vomiting: no nausea/vomiting    Complications: none    Transfer of care protocol was followed      Last vitals:   Visit Vitals  /74   Pulse 93   Temp 36.7 °C (98.1 °F) (Axillary)   Resp (!) 22   Ht 4' 9" (1.448 m)   Wt 34.3 kg (75 lb 9.9 oz)   SpO2 100%   BMI 16.36 kg/m²     "

## 2020-10-29 NOTE — PROGRESS NOTES
[facility]  Colorectal Surgery  Progress Note    Subjective:     History of Present Illness:    25-year-old male with a history of cerebral palsy who is admitted to the ICU with respiratory failure and pneumonia.  Patient has had known bilateral decubitus ulcers that have been difficult to heal with osteomyelitis.  Patient has had continued loose stools and difficulty the diverting the stool away from the wounds even with wound VAC therapy.  During this admission he has also undergone a tracheostomy by ENT.  Colorectal surgery is consulted for evaluation for possible diverting colostomy given the difficulty with diarrhea and wound contamination from stool as the wounds are so close to his anus.  Per his mother who is his primary caregiver and surrogate decision maker, the patient has had a feeding tube placed and his abdomen.  Patient is nonverbal.    Post-Op Info:  Procedure(s) (LRB):  CREATION, TRACHEOSTOMY (N/A)   2 Days Post-Op     Interval History: No acute events overnight.    Medications:  Continuous Infusions:  Scheduled Meds:   baclofen  10 mg Per G Tube TID    ceFEPime (MAXIPIME) IVPB  2 g Intravenous Q8H    chlorhexidine  15 mL Mouth/Throat BID    cholestyramine  1 packet Per G Tube BID    enoxaparin  30 mg Subcutaneous Q24H    famotidine (PF)  20 mg Intravenous BID    fluconazole 40 mg/ml  100 mg Per G Tube Daily    gabapentin  250 mg Per G Tube QHS    metroNIDAZOLE  500 mg Per G Tube Q8H    miconazole nitrate 2%   Topical (Top) BID    PHENobarbitaL  100 mg Per G Tube QHS    propranoloL  30 mg Per G Tube TID    sodium chloride 3%  4 mL Nebulization Q8H     PRN Meds:acetaminophen, albuterol-ipratropium, bisacodyL, influenza, morphine, pneumoc 13-robby conj-dip cr(PF), vancomycin - pharmacy to dose     Review of patient's allergies indicates:  No Known Allergies  Objective:     Vital Signs (Most Recent):  Temp: 98.5 °F (36.9 °C) (10/29/20 0800)  Pulse: 88 (10/29/20 1100)  Resp:  20 (10/29/20 1100)  BP: 121/80 (10/29/20 1100)  SpO2: 100 % (10/29/20 1100) Vital Signs (24h Range):  Temp:  [97.9 °F (36.6 °C)-99 °F (37.2 °C)] 98.5 °F (36.9 °C)  Pulse:  [] 88  Resp:  [14-56] 20  SpO2:  [97 %-100 %] 100 %  BP: (102-131)/(60-86) 121/80     Weight: 34.3 kg (75 lb 9.9 oz)  Body mass index is 16.36 kg/m².    Intake/Output - Last 3 Shifts       10/27 0700 - 10/28 0659 10/28 0700 - 10/29 0659 10/29 0700 - 10/30 0659    P.O.  120     I.V. (mL/kg) 600 (17.5)      NG/GT 1125 1640 50    IV Piggyback 400 250 50    Total Intake(mL/kg) 2125 (62) 2010 (58.6) 100 (2.9)    Urine (mL/kg/hr) 1400 (1.7) 1735 (2.1) 50 (0.3)    Other 35 65     Stool 0 250 50    Total Output 1435 2050 100    Net +690 -40 0           Stool Occurrence  2 x 1 x          Physical Exam  Constitutional:       Appearance: He is well-developed.   HENT:      Head: Atraumatic.   Eyes:      Conjunctiva/sclera: Conjunctivae normal.   Neck:      Thyroid: No thyromegaly.   Cardiovascular:      Rate and Rhythm: Regular rhythm.   Pulmonary:      Comments: Trach in place  Abdominal:      General: There is no distension.      Palpations: Abdomen is soft. There is no mass.      Comments: Gtube in place in LUQ; well-healed transverse incision in Left-mid abdomen   Musculoskeletal:         General: No tenderness.   Skin:     General: Skin is warm and dry.      Capillary Refill: Capillary refill takes less than 2 seconds.      Comments: +bilateral decubitus posterior ulcers with wound vacs in place with good seal/sxn   Neurological:      Mental Status: He is alert. Mental status is at baseline.         Significant Labs:  CBC:   Recent Labs   Lab 10/29/20  0344   WBC 9.75   RBC 3.13*   HGB 8.8*   HCT 29.3*   *   MCV 94   MCH 28.1   MCHC 30.0*     BMP:   Recent Labs   Lab 10/29/20  0344   GLU 95      K 3.9      CO2 27   BUN 14   CREATININE 0.5   CALCIUM 8.8   MG 2.2     Assessment/Plan:     * Decubitus ulcer of ischial area, left,  stage IV  Stage IV bilateral decub ulcers with diarrhea and stool contamination    - to OR today for lap vs open colostomy construction  - All risks, benefits and alternatives fully explained to patient. Risks include, but are not limited to, bleeding, infection, anastomotic leak, damage to ureter, damage to other intra-abdominal organs such as colon, rectum, small bowel, stomach, liver, bladder, reproductive organs, sexual dysfunction, urinary dysfunction, postoperative abscess, conversion to open operation, perioperative MI, CVA and death.  All questions field and appropriately answered to patient's mom satisfaction.  Consent signed and placed on chart via phone conent.  - Hold TFs for now. Okay to resume postop  - WCON c/s for marking    Status post tracheostomy  Management per primary team    Right lower lobe pneumonia  Management per primary team    Osteomyelitis of pelvis, presumed   Management per primary team    Severe protein-calorie malnutrition  Management per primary team    Seizure disorder  Management per primary team    Cerebral palsy  Management per primary team        Michael Ortez MD  Colorectal Surgery  [facility] -

## 2020-10-29 NOTE — PROGRESS NOTES
Pharmacokinetic Assessment Follow Up: IV Vancomycin    Vancomycin serum concentration assessment(s):    The random level was drawn correctly and can be used to guide therapy at this time. The measurement is within the desired definitive target of less than 20 mcg/mL.    Vancomycin Regimen Plan:    Change regimen to Vancomycin 500 mg IV every 12 hours with next serum trough concentration measured at 1400 prior to 3rd dose on 10/30/20.     Drug levels (last 3 results):  Recent Labs   Lab Result Units 10/26/20  1659 10/28/20  0017  10/28/20  2001 10/29/20  0344 10/29/20  1134   Vancomycin, Random ug/mL  --   --    < > 15.7 23.0 14.1   Vancomycin-Trough ug/mL 21.8 25.8*  --   --   --   --     < > = values in this interval not displayed.     Pharmacy will continue to follow and monitor vancomycin.    Please contact pharmacy at extension [phone] for questions regarding this assessment.    Thank you for the consult,   Parish Zimmerman, PharmD Candidate 2021        Patient brief summary:  Glen Moscoso is a 25 y.o. male initiated on antimicrobial therapy with IV Vancomycin for treatment of  stage IV decubitus ulcer, recurrent aspiration pneumonia, presumed osteomyelitis.     The patient's current regimen is pulse dosing (dosing by level) when random level is less than 20 mcg/mL. Regimen will be changed to scheduled vancomycin 500 mg q12H IV.     Drug Allergies:   Review of patient's allergies indicates:  No Known Allergies    Actual Body Weight:   34.3 kg    Renal Function:   Estimated Creatinine Clearance: 111.5 mL/min (based on SCr of 0.5 mg/dL).,     Dialysis Method (if applicable):  N/A    CBC (last 72 hours):  Recent Labs   Lab Result Units 10/27/20  0430 10/28/20  0434 10/29/20  0344   WBC K/uL 10.67 9.29 9.75   Hemoglobin g/dL 9.6* 9.1* 8.8*   Hematocrit % 31.4* 29.1* 29.3*   Platelets K/uL 549* 489* 439*   Gran % % 75.7* 66.4 70.1   Lymph % % 13.3* 18.5 14.6*   Mono % % 9.8 13.2 11.8   Eosinophil % % 0.1 0.1  0.0   Basophil % % 0.3 0.2 0.2   Differential Method  Automated Automated Automated       Metabolic Panel (last 72 hours):  Recent Labs   Lab Result Units 10/27/20  0430 10/28/20  0434 10/29/20  0344   Sodium mmol/L 139 137 139   Potassium mmol/L 4.0 3.9 3.9   Chloride mmol/L 104 104 107   CO2 mmol/L 27 25 27   Glucose mg/dL 98 95 95   BUN mg/dL 7 12 14   Creatinine mg/dL 0.5 0.6 0.5   Albumin g/dL 2.1* 2.0* 2.1*   Total Bilirubin mg/dL 0.1 0.1 0.1   Alkaline Phosphatase U/L 108 162* 155*   AST U/L 18 33 18   ALT U/L 15 31 24   Magnesium mg/dL 2.1 2.0 2.2   Phosphorus mg/dL 4.0 4.4 3.0       Vancomycin Administrations:  vancomycin given in the last 96 hours                   vancomycin 500 mg in dextrose 5 % 100 mL IVPB (ready to mix system) (mg) 500 mg New Bag 10/28/20 2256    vancomycin 500 mg in dextrose 5 % 100 mL IVPB (ready to mix system) (mg) 500 mg New Bag 10/28/20 0806    vancomycin 750 mg in dextrose 5 % 250 mL IVPB (ready to mix system) (mg) 750 mg New Bag 10/27/20 1336     750 mg New Bag  0108    vancomycin in dextrose 5 % 1 gram/250 mL IVPB 1,000 mg (mg) 1,000 mg New Bag 10/26/20 0448     1,000 mg New Bag 10/25/20 1719                Microbiologic Results:  Microbiology Results (last 7 days)     Procedure Component Value Units Date/Time    Blood culture x two cultures. Draw prior to antibiotics. [[id number]] Collected: 10/22/20 1545    Order Status: Completed Specimen: Blood from Peripheral, Upper Arm, Right Updated: 10/27/20 2312     Blood Culture, Routine No growth after 5 days.    Narrative:      Aerobic and anaerobic    Blood culture x two cultures. Draw prior to antibiotics. [[id number]] Collected: 10/22/20 1530    Order Status: Completed Specimen: Blood from Peripheral, Upper Arm, Right Updated: 10/27/20 2312     Blood Culture, Routine No growth after 5 days.    Narrative:      Aerobic and anaerobic    Culture, Respiratory with Gram Stain [[id number]]     Order Status: Canceled Specimen: Respiratory  from Endotracheal Aspirate         Thank you for allowing us to participate in this patient's care.     Parish Zimmerman PharmD Candidate 2021 10/29/2020 2:25 PM  Anjelica Duke PharmD 10/29/2020 2:31 PM

## 2020-10-29 NOTE — PLAN OF CARE
Patient is alert, non-verbal, withdraws to touch. Trach-collar over night, O2 saturation %. Sinus tachycardia with -115. Tube feeding turned off at midnight. Patient turned q2h for skin breakdown prevention. POC reviewed with patient's mother via telephone. Bed is locked in lowest position, bed alarm set, side rails up x2.

## 2020-10-29 NOTE — PLAN OF CARE
Assumed care of patient after completing bedside handoff.  Alarms, access, monitor checked.  Physical exam completed, immediate patient needs addressed.  Patient continuously leaking stool around Flexi-seal.  He has been NPO since midnight, CHG bath done this morning, mother updated.  Patient to go to OR for diverting colostomy today.

## 2020-10-29 NOTE — ASSESSMENT & PLAN NOTE
Transferred recently to LTAC on 10/10 with plan for IV abx and recs to continue until 10/22  Plan return to LTAC next 48 hours with wound care, wound vac and IVAB  ID recs: Will plan to treat with Vancomycin, Cefepime and Flagyl for 6 weeks -EOC -12/09/2020

## 2020-10-29 NOTE — ASSESSMENT & PLAN NOTE
- Anemia of chronic disease   -Monitor H/H and signs of active bleed.  -Transfuse P-RBC for Hgb 7 or under

## 2020-10-30 ENCOUNTER — TELEPHONE (OUTPATIENT)
Dept: RADIOLOGY | Facility: HOSPITAL | Age: 25
End: 2020-10-30

## 2020-10-30 PROBLEM — M86.9 OSTEOMYELITIS, PELVIS: Status: ACTIVE | Noted: 2020-10-30

## 2020-10-30 PROBLEM — R06.03 RESPIRATORY DISTRESS: Status: RESOLVED | Noted: 2020-10-29 | Resolved: 2020-10-30

## 2020-10-30 PROBLEM — K94.23 LEAKING PEG TUBE: Status: ACTIVE | Noted: 2020-10-30

## 2020-10-30 LAB
ALBUMIN SERPL BCP-MCNC: 2.3 G/DL (ref 3.5–5.2)
ALP SERPL-CCNC: 153 U/L (ref 55–135)
ALT SERPL W/O P-5'-P-CCNC: 22 U/L (ref 10–44)
ANION GAP SERPL CALC-SCNC: 11 MMOL/L (ref 8–16)
AST SERPL-CCNC: 18 U/L (ref 10–40)
BASOPHILS # BLD AUTO: ABNORMAL K/UL (ref 0–0.2)
BASOPHILS NFR BLD: 0 % (ref 0–1.9)
BILIRUB SERPL-MCNC: 0.3 MG/DL (ref 0.1–1)
BUN SERPL-MCNC: 14 MG/DL (ref 6–20)
CALCIUM SERPL-MCNC: 9.1 MG/DL (ref 8.7–10.5)
CHLORIDE SERPL-SCNC: 103 MMOL/L (ref 95–110)
CO2 SERPL-SCNC: 22 MMOL/L (ref 23–29)
CREAT SERPL-MCNC: 0.7 MG/DL (ref 0.5–1.4)
DACRYOCYTES BLD QL SMEAR: ABNORMAL
DIFFERENTIAL METHOD: ABNORMAL
EOSINOPHIL # BLD AUTO: ABNORMAL K/UL (ref 0–0.5)
EOSINOPHIL NFR BLD: 0 % (ref 0–8)
ERYTHROCYTE [DISTWIDTH] IN BLOOD BY AUTOMATED COUNT: 14.1 % (ref 11.5–14.5)
EST. GFR  (AFRICAN AMERICAN): >60 ML/MIN/1.73 M^2
EST. GFR  (NON AFRICAN AMERICAN): >60 ML/MIN/1.73 M^2
GLUCOSE SERPL-MCNC: 113 MG/DL (ref 70–110)
HCT VFR BLD AUTO: 32.3 % (ref 40–54)
HGB BLD-MCNC: 9.6 G/DL (ref 14–18)
HYPOCHROMIA BLD QL SMEAR: ABNORMAL
IMM GRANULOCYTES # BLD AUTO: ABNORMAL K/UL (ref 0–0.04)
IMM GRANULOCYTES NFR BLD AUTO: ABNORMAL % (ref 0–0.5)
LYMPHOCYTES # BLD AUTO: ABNORMAL K/UL (ref 1–4.8)
LYMPHOCYTES NFR BLD: 19 % (ref 18–48)
MAGNESIUM SERPL-MCNC: 2 MG/DL (ref 1.6–2.6)
MCH RBC QN AUTO: 28 PG (ref 27–31)
MCHC RBC AUTO-ENTMCNC: 29.7 G/DL (ref 32–36)
MCV RBC AUTO: 94 FL (ref 82–98)
MONOCYTES # BLD AUTO: ABNORMAL K/UL (ref 0.3–1)
MONOCYTES NFR BLD: 4 % (ref 4–15)
NEUTROPHILS NFR BLD: 77 % (ref 38–73)
NRBC BLD-RTO: 0 /100 WBC
PHOSPHATE SERPL-MCNC: 3.3 MG/DL (ref 2.7–4.5)
PLATELET # BLD AUTO: 511 K/UL (ref 150–350)
PLATELET BLD QL SMEAR: ABNORMAL
PMV BLD AUTO: 9.3 FL (ref 9.2–12.9)
POIKILOCYTOSIS BLD QL SMEAR: SLIGHT
POTASSIUM SERPL-SCNC: 4.1 MMOL/L (ref 3.5–5.1)
PROT SERPL-MCNC: 7.3 G/DL (ref 6–8.4)
RBC # BLD AUTO: 3.43 M/UL (ref 4.6–6.2)
SODIUM SERPL-SCNC: 136 MMOL/L (ref 136–145)
TARGETS BLD QL SMEAR: ABNORMAL
VANCOMYCIN TROUGH SERPL-MCNC: 15.1 UG/ML (ref 10–22)
WBC # BLD AUTO: 12.18 K/UL (ref 3.9–12.7)

## 2020-10-30 PROCEDURE — 97803 MED NUTRITION INDIV SUBSEQ: CPT

## 2020-10-30 PROCEDURE — 97605 NEG PRS WND THER DME<=50SQCM: CPT

## 2020-10-30 PROCEDURE — 99233 SBSQ HOSP IP/OBS HIGH 50: CPT | Mod: ,,, | Performed by: INTERNAL MEDICINE

## 2020-10-30 PROCEDURE — 85027 COMPLETE CBC AUTOMATED: CPT

## 2020-10-30 PROCEDURE — 99233 PR SUBSEQUENT HOSPITAL CARE,LEVL III: ICD-10-PCS | Mod: ,,, | Performed by: INTERNAL MEDICINE

## 2020-10-30 PROCEDURE — 25000003 PHARM REV CODE 250: Performed by: COLON & RECTAL SURGERY

## 2020-10-30 PROCEDURE — 84100 ASSAY OF PHOSPHORUS: CPT

## 2020-10-30 PROCEDURE — 25000003 PHARM REV CODE 250: Performed by: INTERNAL MEDICINE

## 2020-10-30 PROCEDURE — 99900026 HC AIRWAY MAINTENANCE (STAT)

## 2020-10-30 PROCEDURE — 63600175 PHARM REV CODE 636 W HCPCS: Performed by: INTERNAL MEDICINE

## 2020-10-30 PROCEDURE — 80053 COMPREHEN METABOLIC PANEL: CPT

## 2020-10-30 PROCEDURE — 99024 POSTOP FOLLOW-UP VISIT: CPT | Mod: ,,, | Performed by: COLON & RECTAL SURGERY

## 2020-10-30 PROCEDURE — 63600175 PHARM REV CODE 636 W HCPCS: Performed by: COLON & RECTAL SURGERY

## 2020-10-30 PROCEDURE — S5010 5% DEXTROSE AND 0.45% SALINE: HCPCS | Performed by: INTERNAL MEDICINE

## 2020-10-30 PROCEDURE — 20000000 HC ICU ROOM

## 2020-10-30 PROCEDURE — 94640 AIRWAY INHALATION TREATMENT: CPT

## 2020-10-30 PROCEDURE — 27000221 HC OXYGEN, UP TO 24 HOURS

## 2020-10-30 PROCEDURE — 99900035 HC TECH TIME PER 15 MIN (STAT)

## 2020-10-30 PROCEDURE — 99024 PR POST-OP FOLLOW-UP VISIT: ICD-10-PCS | Mod: ,,, | Performed by: COLON & RECTAL SURGERY

## 2020-10-30 PROCEDURE — 83735 ASSAY OF MAGNESIUM: CPT

## 2020-10-30 PROCEDURE — 80202 ASSAY OF VANCOMYCIN: CPT

## 2020-10-30 PROCEDURE — 85007 BL SMEAR W/DIFF WBC COUNT: CPT

## 2020-10-30 PROCEDURE — 36415 COLL VENOUS BLD VENIPUNCTURE: CPT

## 2020-10-30 PROCEDURE — 25000242 PHARM REV CODE 250 ALT 637 W/ HCPCS: Performed by: COLON & RECTAL SURGERY

## 2020-10-30 PROCEDURE — 27000325 HC DRESSING INFOVAC LARGE BLK

## 2020-10-30 RX ORDER — DEXTROSE MONOHYDRATE AND SODIUM CHLORIDE 5; .45 G/100ML; G/100ML
INJECTION, SOLUTION INTRAVENOUS CONTINUOUS
Status: DISCONTINUED | OUTPATIENT
Start: 2020-10-30 | End: 2020-10-30

## 2020-10-30 RX ADMIN — MICONAZOLE NITRATE: 2 OINTMENT TOPICAL at 08:10

## 2020-10-30 RX ADMIN — METRONIDAZOLE 500 MG: 500 TABLET ORAL at 05:10

## 2020-10-30 RX ADMIN — CEFEPIME HYDROCHLORIDE 2 G: 2 INJECTION, SOLUTION INTRAVENOUS at 11:10

## 2020-10-30 RX ADMIN — FAMOTIDINE 20 MG: 10 INJECTION INTRAVENOUS at 08:10

## 2020-10-30 RX ADMIN — PROPRANOLOL HYDROCHLORIDE 30 MG: 20 SOLUTION ORAL at 08:10

## 2020-10-30 RX ADMIN — CEFEPIME HYDROCHLORIDE 2 G: 2 INJECTION, SOLUTION INTRAVENOUS at 12:10

## 2020-10-30 RX ADMIN — CEFEPIME HYDROCHLORIDE 2 G: 2 INJECTION, SOLUTION INTRAVENOUS at 04:10

## 2020-10-30 RX ADMIN — BACLOFEN 10 MG: 10 TABLET ORAL at 02:10

## 2020-10-30 RX ADMIN — VANCOMYCIN HYDROCHLORIDE 500 MG: 500 INJECTION, POWDER, LYOPHILIZED, FOR SOLUTION INTRAVENOUS at 03:10

## 2020-10-30 RX ADMIN — METRONIDAZOLE 500 MG: 500 TABLET ORAL at 09:10

## 2020-10-30 RX ADMIN — CEFEPIME HYDROCHLORIDE 2 G: 2 INJECTION, SOLUTION INTRAVENOUS at 07:10

## 2020-10-30 RX ADMIN — VANCOMYCIN HYDROCHLORIDE 500 MG: 500 INJECTION, POWDER, LYOPHILIZED, FOR SOLUTION INTRAVENOUS at 04:10

## 2020-10-30 RX ADMIN — BACLOFEN 10 MG: 10 TABLET ORAL at 08:10

## 2020-10-30 RX ADMIN — CHOLESTYRAMINE 4 G: 4 POWDER, FOR SUSPENSION ORAL at 08:10

## 2020-10-30 RX ADMIN — CHLORHEXIDINE GLUCONATE 0.12% ORAL RINSE 15 ML: 1.2 LIQUID ORAL at 08:10

## 2020-10-30 RX ADMIN — FLUCONAZOLE 100 MG: 40 POWDER, FOR SUSPENSION ORAL at 08:10

## 2020-10-30 RX ADMIN — PHENOBARBITAL 100 MG: 20 ELIXIR ORAL at 09:10

## 2020-10-30 RX ADMIN — SODIUM CHLORIDE 30 MG/ML INHALATION SOLUTION 4 ML: 30 SOLUTION INHALANT at 04:10

## 2020-10-30 RX ADMIN — SODIUM CHLORIDE 30 MG/ML INHALATION SOLUTION 4 ML: 30 SOLUTION INHALANT at 07:10

## 2020-10-30 RX ADMIN — METRONIDAZOLE 500 MG: 500 TABLET ORAL at 02:10

## 2020-10-30 RX ADMIN — ENOXAPARIN SODIUM 30 MG: 100 INJECTION SUBCUTANEOUS at 04:10

## 2020-10-30 RX ADMIN — DEXTROSE AND SODIUM CHLORIDE: 5; .45 INJECTION, SOLUTION INTRAVENOUS at 02:10

## 2020-10-30 RX ADMIN — SODIUM CHLORIDE 30 MG/ML INHALATION SOLUTION 4 ML: 30 SOLUTION INHALANT at 11:10

## 2020-10-30 RX ADMIN — PROPRANOLOL HYDROCHLORIDE 30 MG: 20 SOLUTION ORAL at 02:10

## 2020-10-30 RX ADMIN — MORPHINE SULFATE 2 MG: 2 INJECTION, SOLUTION INTRAMUSCULAR; INTRAVENOUS at 10:10

## 2020-10-30 RX ADMIN — GABAPENTIN 250 MG: 250 SUSPENSION ORAL at 09:10

## 2020-10-30 NOTE — PROGRESS NOTES
PT remains on ATP. Neb  TX  via inline.  toil well. Sx via nguyen. # 8 Karissaley secured and in place.   extra trach at bedside.

## 2020-10-30 NOTE — PLAN OF CARE
Attempted to call mother Leni for update.  Left voicemail stating that pt can DC to [facility] once colostomy output begins and he may DC over the weekend after confirmed with Ledy liaison at  [facility]. 1238 Able to speak to Leni and update. Leni verbalized understanding.     10/30/20 1218   Post-Acute Status   Post-Acute Authorization Placement   Post-Acute Placement Status   (per Ledy, liaison with [facility], pt can DC to facility over weekend once colostomy output and medically cleared)   Part D Coverage NA   Discharge Delays (!) Other  (awaiting colostomy output)   Discharge Plan   Discharge Plan A Long-term acute care facility (LTAC)

## 2020-10-30 NOTE — PROGRESS NOTES
[facility] Hospital Medicine  Progress Note    Patient Name: Glen Moscoso  MRN: [id number]  Patient Class: IP- Inpatient   Admission Date: 10/22/2020  Length of Stay: 8 days  Attending Physician: Travis Bobo MD  Primary Care Provider: Yadi Lawson MD        Subjective:     Principal Problem:Decubitus ulcer of ischial area, left, stage IV        HPI:  Glen Moscoso is a 25 y.o. male patient with a PMHx of cerebral palsy who presents to the Emergency Department for evaluation of respiratory distress which onset PTA. EMS reports pt was found face down on the pillow in nursing home. Pt arrives tachycardic and tachypneic. Patient recently discharge from [facility] where he was treated for respiratory failure and sepsis. Patient was discharged on 10/10/20 to [facility] on IV Vancomycin, Cefepime and Flagyl to treat pelvic osteomyelitis and resolving colitis. In the ED, WBCs 18.7K, Platelet 681, CO2 20, Lactate 2.3. UA negative. CXR revealed mild atelectasis or infiltrate right perihilar region and right midlung zone. Pt with worsening resp failure Subsequently pt was intubated. Sepsis protocol initiated in the ED including IV hydration. Hospital medicine called for admission. Patient placed in ICU. Patient is a full code, SDM mother, Leni Moscoso at [phone].     Overview/Hospital Course:  10/23  Remains intubated , enteral nutrition initiated. Continued treatment of sepsis due to aspiration pneumonia .due to poor air way protection .  Cultures are with negative growth to date. Temp Max 103 and wbc 19.30   Tachycardia was reported over night , follow up Echo EF 50 % normal diastolic function.  Case discussed with Dr Palma who has agreed for trach placement due to recurrent aspiration pneumonia.    10/24    Remains intubated, Tmax of 101.5 .CT Abd/pelvis - right inferior pubic ramus -cannot exclude     Osteomyelitis.      10/25   Self extubated, presently on Room air    Case discussed with  Dr Palma who has agreed for trach placement due to recurrent aspiration pneumonia.  10/26- Afebrile. Remains on RA with satisfactory SpO2. Trach placement planned for tomorrow . Wound vac placed to Rt./Lt ischium stage IV pressure ulcers with full thickness tissue loss with exposed bone. General Surgery consulted  to evaluate pt for diverting colostomy to prevent fecal contamination of wound . Rectal tube in place. Labs- WBC normalize 10.1, Hbg 9.2, Pl 512, creatinine 0.5   10/27- S/P tracheostomy placement today. Remains sedated on mechanical ventilation via  trach . Labs are unremarkable . General Surgery is recommending to continue wound vac and rectal tube for now , diverting colostomy is not suggested at this time.   10/28- Tmax 99. Transition to trach collar . SpO2 100% on O2 at 5 L/min . No distress noted . Per nursing staff liquid stool sips around Rectal tube . Wound vac contaminated with liquid stool . Spoke to Dr. Ortez and he will evaluate pt for diverting colostomy . ID consult obtained and recs are noted. Add Questran powder for loose stool. Labs are unremarkable.   10/29- Afebrile . Trach collar in place. Patient to go to OR for diverting colostomy today. Antibiotic continues.   10/30- s/p Laparoscopic sigmoid loop colostomy construction performed yesterday. Leakage noted around G-tube . Discussed with Dr. Ortez and recommend Fluro gastric tube study . Hold PEG feeding for now.     Interval History:    s/p Laparoscopic sigmoid loop colostomy construction performed yesterday. Leakage noted around G-tube . Discussed with Dr. Ortez and recommend Fluro gastric tube study . Hold PEG feeding for now      Review of Systems   Unable to perform ROS: Patient nonverbal     Objective:     Vital Signs (Most Recent):  Temp: 98.8 °F (37.1 °C) (10/30/20 1101)  Pulse: 100 (10/30/20 1400)  Resp: (!) 25 (10/30/20 1400)  BP: 110/72 (10/30/20 1400)  SpO2: 100 % (10/30/20 1400) Vital Signs (24h Range):  Temp:  [98.1 °F  (36.7 °C)-99 °F (37.2 °C)] 98.8 °F (37.1 °C)  Pulse:  [] 100  Resp:  [18-43] 25  SpO2:  [90 %-100 %] 100 %  BP: ()/() 110/72     Weight: 32 kg (70 lb 8.8 oz)  Body mass index is 15.27 kg/m².    Intake/Output Summary (Last 24 hours) at 10/30/2020 1419  Last data filed at 10/30/2020 1300  Gross per 24 hour   Intake 655 ml   Output 1400 ml   Net -745 ml      Physical Exam  Constitutional:       General: He is not in acute distress.     Appearance: He is well-developed. He is not diaphoretic.      Comments: Awake , nonverbal    HENT:      Head: Normocephalic and atraumatic.      Mouth/Throat:      Pharynx: No oropharyngeal exudate.   Eyes:      Conjunctiva/sclera: Conjunctivae normal.      Pupils: Pupils are equal, round, and reactive to light.   Neck:      Musculoskeletal: Normal range of motion and neck supple.      Thyroid: No thyromegaly.      Vascular: No JVD.   Cardiovascular:      Rate and Rhythm: Regular rhythm. Tachycardia present.      Heart sounds: Normal heart sounds. No murmur.   Pulmonary:      Effort: Pulmonary effort is normal. No respiratory distress.      Breath sounds: Normal breath sounds. No wheezing or rales.      Comments: Trach collar in place   Chest:      Chest wall: No tenderness.   Abdominal:      General: Bowel sounds are normal. There is no distension.      Palpations: Abdomen is soft.      Tenderness: There is no abdominal tenderness. There is no guarding or rebound.      Comments: Colostomy LLQ in place    Lymphadenopathy:      Cervical: No cervical adenopathy.   Skin:     General: Skin is warm and dry.      Findings: No rash.      Comments: Wound vac in place , kathia Ischium    Neurological:      Comments: Cerebral palsy, non verbal          Significant Labs:   CBC:   Recent Labs   Lab 10/29/20  0344 10/30/20  0513   WBC 9.75 12.18   HGB 8.8* 9.6*   HCT 29.3* 32.3*   * 511*     CMP:   Recent Labs   Lab 10/29/20  0344 10/30/20  0513    136   K 3.9 4.1     103   CO2 27 22*   GLU 95 113*   BUN 14 14   CREATININE 0.5 0.7   CALCIUM 8.8 9.1   PROT 6.7 7.3   ALBUMIN 2.1* 2.3*   BILITOT 0.1 0.3   ALKPHOS 155* 153*   AST 18 18   ALT 24 22   ANIONGAP 5* 11   EGFRNONAA >60 >60       Significant Imaging:       Assessment/Plan:      * Decubitus ulcer of ischial area, left, stage IV  Wound Care  Antibiotics per ID  PICC in place  Possible osteomyelitis   Wound vac placed   General Surgery consult to evaluate for diverting colostomy to prevent fecal contamination of wound.   10/29-   Patient to go to OR for diverting colostomy today  10/30 -  s/p Laparoscopic sigmoid loop colostomy construction performed yesterday        Osteomyelitis of pelvis, presumed   ID recommended 6 weeks therapy  with Vancomycin, Cefepime and Flagyl  -EOC -12/09/2020      Leaking PEG tube  10/30-  Fluro Gastric tube study today.       Right lower lobe pneumonia  Recurrent Aspiration Pneumonia  S/P Trach placement 10/27/20  PEG tube for feeding and medication administration   Continue antibiotic for 5 to 7 days   ID consult obtained . Antibiotic extended x 6 weeks for presumed pelvic osteomyelitis     Status post tracheostomy  -Trach placed and transition to trach collar       Tachycardia  Due to infection  ABX  Symptomatic care  Monitor  Currently controlled with BB         Severe protein-calorie malnutrition  Enteral tube feeds   Continue supplements        Anemia, unspecified  - Anemia of chronic disease   -Monitor H/H and signs of active bleed.  -Transfuse P-RBC for Hgb 7 or under       Seizure disorder  Phenobarbital  Neuro checks    Cerebral palsy  Supportive care       VTE Risk Mitigation (From admission, onward)         Ordered     enoxaparin injection 30 mg  Every 24 hours      10/27/20 1517     IP VTE HIGH RISK PATIENT  Once      10/22/20 1825     Place sequential compression device  Until discontinued      10/22/20 1754                Discharge Planning   ROCÍO:      Code Status: Full Code   Is the  patient medically ready for discharge?:     Reason for patient still in hospital (select all that apply): Pending disposition  Discharge Plan A: Long-term acute care facility (LTAC)   Discharge Delays: (!) Other(awaiting colostomy output)        Critical care time spent on the evaluation and treatment of severe organ dysfunction, review of pertinent labs and imaging studies, discussions with consulting providers and discussions with patient/family: 30  minutes.      Travis Bobo MD  Department of Hospital Medicine   [facility]

## 2020-10-30 NOTE — PROGRESS NOTES
[facility]   Adult Nutrition  Progress Note    SUMMARY     Recommendations    Recommendation:   1. As possible, resume enteral nutrition to Nutren 1.5 @ 40mL/hr. Flushes per NP.  Provides 1440kcals, 58g protein, 744mL free water.   2. 1 packet arginaid via PEG, BID  3. RD to f/u    Goals: Meet >75% EEN/EPN by RD f/u  Nutrition Goal Status: goal met, now TF paused  Communication of RD Recs: POC    Reason for Assessment    Reason For Assessment: follow up   Diagnosis: Pnuemonia, resp failure  Relevant Medical History: spastic cerebral palsy, colitis, severe contractures, seizure disorder, malnutrition, pnuemoperioneum, suspected colon perforation, GERD  Interdisciplinary Rounds: attended    General Information Comments:   10/23/20 RD consulted for TF recs. Pt was found down at his care facility and brought to [facility] last night. He was recently hospitalized with pnuemonia and pressure injuries and was seen by an RD and diagnosed as malnourished. He has a PEG tube, and rec above is for formula well tolerated at last admit. NFPE- pt remains malnourished, documented in greater detail below.   10/27/20 Trach placed this morning. Pt has had liquid stools which is at risk of contaminating decubitous wound, even with fecal management system. Recommending changed to an enteral formula without fiber to potentially lessen diarrhea. He has otherwise been tolerating.   10/30/20 Diverting colostomy yesterday. Red and large site, no stool produced yet. Per RN, TF appears to be leaking. Surgery will f/u today. If pt is stable, he may be discharged to care facility.     Nutrition Discharge Planning: PEG feeding    Nutrition Risk Screen    Nutrition Risk Screen: large or nonhealing wound, burn or pressure injury    Nutrition/Diet History    Food Allergies: NKFA  Factors Affecting Nutritional Intake: NPO, on mechanical ventilation  Nutrition Support Formula Prior to Admit: Peptamen 1.5 Prebio  Nutrition Support Rate  "Prior to Admit: 40 (ml)  Nutrtion Support Frequency Prior to Admit: continuous    Anthropometrics    Temp: 98.4 °F (36.9 °C)  Height Method: Estimated  Height: 4' 9" (144.8 cm)  Height (inches): 57 in  Weight Method: Bed Scale  Weight: 32 kg (70 lb 8.8 oz)  Weight (lb): 70.55 lb  Ideal Body Weight (IBW), Male: 88 lb  % Ideal Body Weight, Male (lb): 80.42 %  BMI (Calculated): 15.3  BMI Grade: 16 - 16.9 protein-energy malnutrition grade II     Lab/Procedures/Meds  Pertinent Labs: reviewed  Albumin 2.3, Hgb 9.6, HCT 32.3  Pertinent Medications: reviewed  Pepcid, Cholestyramine    Physical Findings/Assessment     Severe muscle contracture and pressure injuries    Estimated/Assessed Needs    Weight Used For Calorie Calculations: 34.3 kg (75 lb 9.9 oz)  Energy Calorie Requirements (kcal): 1550  Energy Need Method: [facility]  Protein Requirements: 40- 55g(1.2- 1.5g/kg per malnutrition)  Weight Used For Protein Calculations: 34.3 kg (75 lb 9.9 oz)     Estimated Fluid Requirement Method: RDA Method(or per MD)  RDA Method (mL): 1550     Nutrition Prescription Ordered    Current Diet Order: NPO  Current Nutrition Support Formula Ordered: Peptamen 1.5 w/Prebio  Current Nutrition Support Rate Ordered: 45 (ml)  Current Nutrition Support Frequency Ordered: continuous    Evaluation of Received Nutrient/Fluid Intake  TF on hold due to leaking site    Intake/Output Summary (Last 24 hours) at 10/30/2020 0918  Last data filed at 10/30/2020 0800  Gross per 24 hour   Intake 1155 ml   Output 1310 ml   Net -155 ml     % Intake of Estimated Energy Needs: 0 - 25 %  % Meal Intake: NPO    Nutrition Risk    2x week    Assessment and Plan    Severe protein-calorie malnutrition  Nutrition Problem:  Severe Protein-Calorie Malnutrition  Malnutrition in the context of Chronic Illness/Injury    Related to (etiology):  Physiological causes resulting in anorexia or diminished intake    Signs and Symptoms (as evidenced by):  Body Fat Depletion: mild " depletion of orbitals   Muscle Mass Depletion: mild and moderate depletion of temples and clavicle region   Hx of cerebral palsy, malnutrition, wounds    Interventions(treatment strategy):  Enteral nutrition    Nutrition Diagnosis Status:  Improving       Monitor and Evaluation    Food and Nutrient Intake: energy intake  Food and Nutrient Adminstration: enteral and parenteral nutrition administration  Physical Activity and Function: nutrition-related ADLs and IADLs  Anthropometric Measurements: weight  Biochemical Data, Medical Tests and Procedures: electrolyte and renal panel, gastrointestinal profile, glucose/endocrine profile, inflammatory profile, lipid profile  Nutrition-Focused Physical Findings: overall appearance     Malnutrition Assessment  Malnutrition Type: chronic illness  Skin (Micronutrient): wounds unhealed   Micronutrient Evaluation Comments: hx of malnutrition       Orbital Region (Subcutaneous Fat Loss): mild depletion   [place] Region (Muscle Loss): mild depletion  Clavicle Bone Region (Muscle Loss): mild depletion               Nutrition Follow-Up    RD Follow-up?: Yes    Thanks!  Tamie Fontaine MPH RD LDN

## 2020-10-30 NOTE — NURSING
"IR contacted concerning "okay to use PEG tube" order. New PEG remains clamped. Will continue to monitor.   "

## 2020-10-30 NOTE — ASSESSMENT & PLAN NOTE
Wound Care  Antibiotics per ID  PICC in place  Possible osteomyelitis   Wound vac placed   General Surgery consult to evaluate for diverting colostomy to prevent fecal contamination of wound.   10/29-   Patient to go to OR for diverting colostomy today  10/30 -  s/p Laparoscopic sigmoid loop colostomy construction performed yesterday

## 2020-10-30 NOTE — PROGRESS NOTES
PT remains on humidity running at 5 lpm on air. Via ATP. RT did trach care and changed inner cannula. . Sutures still in tac. .   No distress ob served.

## 2020-10-30 NOTE — SUBJECTIVE & OBJECTIVE
Interval History:    s/p Laparoscopic sigmoid loop colostomy construction performed yesterday. Leakage noted around G-tube . Discussed with Dr. Ortez and recommend Fluro gastric tube study . Hold PEG feeding for now      Review of Systems   Unable to perform ROS: Patient nonverbal     Objective:     Vital Signs (Most Recent):  Temp: 98.8 °F (37.1 °C) (10/30/20 1101)  Pulse: 100 (10/30/20 1400)  Resp: (!) 25 (10/30/20 1400)  BP: 110/72 (10/30/20 1400)  SpO2: 100 % (10/30/20 1400) Vital Signs (24h Range):  Temp:  [98.1 °F (36.7 °C)-99 °F (37.2 °C)] 98.8 °F (37.1 °C)  Pulse:  [] 100  Resp:  [18-43] 25  SpO2:  [90 %-100 %] 100 %  BP: ()/() 110/72     Weight: 32 kg (70 lb 8.8 oz)  Body mass index is 15.27 kg/m².    Intake/Output Summary (Last 24 hours) at 10/30/2020 1419  Last data filed at 10/30/2020 1300  Gross per 24 hour   Intake 655 ml   Output 1400 ml   Net -745 ml      Physical Exam  Constitutional:       General: He is not in acute distress.     Appearance: He is well-developed. He is not diaphoretic.      Comments: Awake , nonverbal    HENT:      Head: Normocephalic and atraumatic.      Mouth/Throat:      Pharynx: No oropharyngeal exudate.   Eyes:      Conjunctiva/sclera: Conjunctivae normal.      Pupils: Pupils are equal, round, and reactive to light.   Neck:      Musculoskeletal: Normal range of motion and neck supple.      Thyroid: No thyromegaly.      Vascular: No JVD.   Cardiovascular:      Rate and Rhythm: Regular rhythm. Tachycardia present.      Heart sounds: Normal heart sounds. No murmur.   Pulmonary:      Effort: Pulmonary effort is normal. No respiratory distress.      Breath sounds: Normal breath sounds. No wheezing or rales.      Comments: Trach collar in place   Chest:      Chest wall: No tenderness.   Abdominal:      General: Bowel sounds are normal. There is no distension.      Palpations: Abdomen is soft.      Tenderness: There is no abdominal tenderness. There is no guarding  or rebound.      Comments: Colostomy LLQ in place    Lymphadenopathy:      Cervical: No cervical adenopathy.   Skin:     General: Skin is warm and dry.      Findings: No rash.      Comments: Wound vac in place , kathia Ischium    Neurological:      Comments: Cerebral palsy, non verbal          Significant Labs:   CBC:   Recent Labs   Lab 10/29/20  0344 10/30/20  0513   WBC 9.75 12.18   HGB 8.8* 9.6*   HCT 29.3* 32.3*   * 511*     CMP:   Recent Labs   Lab 10/29/20  0344 10/30/20  0513    136   K 3.9 4.1    103   CO2 27 22*   GLU 95 113*   BUN 14 14   CREATININE 0.5 0.7   CALCIUM 8.8 9.1   PROT 6.7 7.3   ALBUMIN 2.1* 2.3*   BILITOT 0.1 0.3   ALKPHOS 155* 153*   AST 18 18   ALT 24 22   ANIONGAP 5* 11   EGFRNONAA >60 >60       Significant Imaging:

## 2020-10-30 NOTE — ASSESSMENT & PLAN NOTE
Stage IV bilateral decub ulcers with diarrhea and stool contamination now s/p lap colostomy construction on 10/29/2020    - cont WCON education and care for colostomy  - okay for TFs from surgical standpoint

## 2020-10-30 NOTE — OP NOTE
Pre Op Diagnosis: G tube malfunction     Post Op Diagnosis: same     Procedure:  replacement     Procedure performed by: Ana ESCOTO, Alem SANTOS     Written Informed Consent Obtained: Yes     Specimen Removed:  no     Estimated Blood Loss:  minimal     Findings: Local anesthesia and moderate sedation were used.     The patient tolerated the procedure well and there were no complications.      Sterile technique was performed in the mid abdomen, lidocaine was used as a local anesthetic.  Melo button replaced with a G tube.  Ready to use.  The G tube may be exchanged for a melo button at a future date.  Pt tolerated the procedure well without immediate complications.  Please see radiologist report for details. F/u with PCP and/or ordering physician.

## 2020-10-30 NOTE — ASSESSMENT & PLAN NOTE
Transferred recently to LTAC on 10/10 with plan return to LTAC for IV abx   Plan return to LTAC next 48 hours with wound care, wound vac and IVAB  ID recs: Will plan to treat with Vancomycin, Cefepime and Flagyl for 6 weeks -EOC -12/09/2020

## 2020-10-30 NOTE — PROGRESS NOTES
10/30/20 1015   Gastrointestinal   GI WDL ex   Abdominal Appearance nondistended   Stool Color other (see comments)  (bowel sweat)        Rectal Tube 10/25/20 1400 rectal tube w/ balloon (indicate number of mLs)   Placement Date/Time: 10/25/20 1400   Present Prior to Hospital Arrival?: No  Inserted by: RN  Type: rectal tube w/ balloon (indicate number of mLs)   Insertion Site Appearance no redness, warmth, tenderness, skin breakdown, drainage        Gastrostomy/Enterostomy 10/22/20 1300 LUQ   Placement Date/Time: 10/22/20 1300   Present Prior to Hospital Arrival?: Yes  Location: LUQ   Securement secured to abdomen   Drainage green;thin   Clamp Status/Tolerance clamped   Dressing saturated   Insertion Site no redness;no warmth;no tenderness;no swelling   Site Care site cleansed w/ sterile normal saline;skin barrier applied;other (see comments)  (pouch applied due to leakage)        Colostomy 10/29/20 1349 Descending/sigmoid LLQ   Placement Date/Time: 10/29/20 1349   Present Prior to Hospital Arrival?: No  Inserted by: MD  Colostomy Type: (c) Descending/sigmoid  Location: LLQ   Stomal Appliance 1 piece;Intact;Changed;No Leakage   Stoma Appearance swollen;red;moist   Site Assessment Intact   Peristomal Assessment Intact   Accessories/Skin Care cleansed w/ water;skin sealant;skin barrier ring   Stoma Function bowel sweat   Treatment Bag change   Skin   Skin WDL ex   Skin Color/Characteristics redness blanchable   Skin Temperature warm   Skin Moisture dry   Skin Integrity wound   Specialty Bed/Overlay Low air loss;Air fluidized   Bed Support Surface Assessed        Negative Pressure Wound Therapy  10/26/20    Placement Date: 10/26/20   Side: (c)   Location: Ischial tuberosity   NPWT Type Vacuum Therapy   Therapy Setting NPWT Continuous therapy   Pressure Setting NPWT 125 mmHg   Therapy Interventions NPWT Y connector   Sponges Inserted NPWT Black   Sponges Removed NPWT Black        Altered Skin Integrity 10/23/20 Right  Ischial tuberosity Full thickness tissue loss with exposed bone, tendon, or muscle. Often includes undermining and tunneling. May extend into muscle and/or supporting structures.   Date First Assessed: 10/23/20   Altered Skin Integrity Present on Admission: yes  Side: Right  Location: Ischial tuberosity  Description of Altered Skin Integrity: Full thickness tissue loss with exposed bone, tendon, or muscle. Often includes undermi...   Description of Altered Skin Integrity Full thickness tissue loss with exposed bone, tendon, or muscle. Often includes undermining and tunneling. May extend into muscle and/or supporting structures.   Dressing Appearance Intact   Drainage Amount Scant   Drainage Characteristics/Odor Serosanguineous   Appearance Red;Moist;Muscle;Granulating   Tissue loss description Full thickness   Periwound Area Intact   Wound Edges Open   Care Cleansed with:;Wound cleanser;Applied:;Skin Barrier   Dressing Changed   Packing Inserted  1   Packing Removed 1   Periwound Care Skin barrier film applied;Hydrocolloid barrier applied   Dressing Change Due 11/02/20        Altered Skin Integrity 10/23/20 Left Ischial tuberosity Full thickness tissue loss with exposed bone, tendon, or muscle. Often includes undermining and tunneling. May extend into muscle and/or supporting structures.   Date First Assessed: 10/23/20   Altered Skin Integrity Present on Admission: yes  Side: Left  Location: Ischial tuberosity  Description of Altered Skin Integrity: Full thickness tissue loss with exposed bone, tendon, or muscle. Often includes undermin...   Description of Altered Skin Integrity Full thickness tissue loss with exposed bone, tendon, or muscle. Often includes undermining and tunneling. May extend into muscle and/or supporting structures.   Dressing Appearance Intact   Drainage Amount Scant   Drainage Characteristics/Odor Serosanguineous   Appearance Red;Moist;Muscle;Granulating   Tissue loss description Full thickness  "  Periwound Area Redness   Wound Edges Open   Care Cleansed with:;Wound cleanser;Applied:;Skin Barrier   Dressing Changed   Packing Inserted  1   Packing Removed 1   Periwound Care Skin barrier film applied;Hydrocolloid barrier applied   Dressing Change Due 11/02/20       F/U visit with Mr. Moscoso for continued wound vac management and new colostomy management. He is awake and alert, pre-medicated for pain by primary nurse ROBBIN Lucas. Visited by Dr. Bobo and JAHAIRA Lawson NP during care provided. He is now POD1 LLQ diverting loop colostomy. Once piece surgical pouch lifting at edge. 70mL bowel sweat noted in pouch, no flatus, no stool. Pouch removed. Stoma is moist, bright red, edematous and "mushroomed", nayely stomal skin intact. Nayely stomal skin cleansed with water and gauze, sprayed with cavilon skin barrier film, and ostomy ring applied. One piece flat pouch cut to 50mm and applied around stoma, pressing to seal. KEYSHAWN-HUTTON G tube again noted to LUQ, now leaking moderate amounts green thin gastric contents, feedings currently off. Gauze dressing changed, but quickly saturated. Primary nurse states he thinks the balloon is underinflated but is reaching out to GI and general surgery about the tube at this time. Due to amount of leakage, decision made to pouch around G-Tube for containment until problem can be solved. One piece flat urostomy pouch cut to size. Ostomy ring applied around tube, and urostomy pouch applied over tube insertion site with tubing fed through pouch and out a hole cut in the right side of the pouch, secured with bottle nipple. Patient tolerated well.  Good improvement in MASD/intertrigo/yeast from prior assessments.    Wound vac dressing then changed:  Right Ischium wound vac dressing removed. Wound measurements unchanged from prior assessment. Wound bed is moist, red budding granulation tissue over muscle, bone remains palpable. Cleansed with wound cleanser spray and patted dry. Nayely wound skin " sprayed with cavilon, stoma ring applied to edges. One piece black foam cut to size and applied to fill wound, and sealed with hydrocolloid and drape, patient then repositioned to right side semi-prone.  Left ischium wound vac dressing removed.  Wound measurements unchanged from prior assessment. Wound bed is moist, red budding granulation tissue over muscle, bone remains palpable. Cleansed with wound cleanser spray and patted dry. Nayely wound skin sprayed with cavilon, stoma ring applied to edges. One piece black foam cut to size and applied to fill wound, and sealed with hydrocolloid and drape, patient then repositioned supine with extremities supported. Both wound VAC dressings connected via Y-connector to I wound vac ulta at same settings: continuous -125 mmHg low intensity suction with good seal noted, no air leak.  Next wound vac dressing change scheduled Monday 11/2/2020. Discharge plan is [facility] once ostomy begins functioning.

## 2020-10-30 NOTE — PROGRESS NOTES
Pharmacokinetic Assessment Follow Up: IV Vancomycin    Vancomycin serum concentration assessment(s):    The trough level was drawn correctly and can be used to guide therapy at this time. The measurement is within the desired definitive target range of 15 to 20 mcg/mL.    Vancomycin Regimen Plan:    Continue regimen to Vancomycin 500 mg IV every 12 hours with next serum trough concentration measured at 0230 prior to 4th dose on 11/1    Drug levels (last 3 results):  Recent Labs   Lab Result Units 10/28/20  0017  10/28/20  2001 10/29/20  0344 10/29/20  1134 10/30/20  1400   Vancomycin, Random ug/mL  --    < > 15.7 23.0 14.1  --    Vancomycin-Trough ug/mL 25.8*  --   --   --   --  15.1    < > = values in this interval not displayed.       Pharmacy will continue to follow and monitor vancomycin.    Please contact pharmacy at extension [phone] for questions regarding this assessment.    Thank you for the consult,   Pauline Page       Patient brief summary:  Glen Moscoso is a 25 y.o. male initiated on antimicrobial therapy with IV Vancomycin for treatment of bone/joint infection    The patient's current regimen is Vancomycin 500mg q12    Drug Allergies:   Review of patient's allergies indicates:  No Known Allergies    Actual Body Weight:   32kg    Renal Function:   Estimated Creatinine Clearance: 74.3 mL/min (based on SCr of 0.7 mg/dL).,     Dialysis Method (if applicable):  N/A    CBC (last 72 hours):  Recent Labs   Lab Result Units 10/28/20  0434 10/29/20  0344 10/30/20  0513   WBC K/uL 9.29 9.75 12.18   Hemoglobin g/dL 9.1* 8.8* 9.6*   Hematocrit % 29.1* 29.3* 32.3*   Platelets K/uL 489* 439* 511*   Gran % % 66.4 70.1 77.0*   Lymph % % 18.5 14.6* 19.0   Mono % % 13.2 11.8 4.0   Eosinophil % % 0.1 0.0 0.0   Basophil % % 0.2 0.2 0.0   Differential Method  Automated Automated Manual       Metabolic Panel (last 72 hours):  Recent Labs   Lab Result Units 10/28/20  0434 10/29/20  0344 10/30/20  0513   Sodium mmol/L 137  139 136   Potassium mmol/L 3.9 3.9 4.1   Chloride mmol/L 104 107 103   CO2 mmol/L 25 27 22*   Glucose mg/dL 95 95 113*   BUN mg/dL 12 14 14   Creatinine mg/dL 0.6 0.5 0.7   Albumin g/dL 2.0* 2.1* 2.3*   Total Bilirubin mg/dL 0.1 0.1 0.3   Alkaline Phosphatase U/L 162* 155* 153*   AST U/L 33 18 18   ALT U/L 31 24 22   Magnesium mg/dL 2.0 2.2 2.0   Phosphorus mg/dL 4.4 3.0 3.3       Vancomycin Administrations:  vancomycin given in the last 96 hours                   vancomycin 500 mg in dextrose 5 % 100 mL IVPB (ready to mix system) (mg) 500 mg New Bag 10/30/20 0318     500 mg New Bag 10/29/20 1505    vancomycin 500 mg in dextrose 5 % 100 mL IVPB (ready to mix system) (mg) 500 mg New Bag 10/28/20 2256    vancomycin 500 mg in dextrose 5 % 100 mL IVPB (ready to mix system) (mg) 500 mg New Bag 10/28/20 0806    vancomycin 750 mg in dextrose 5 % 250 mL IVPB (ready to mix system) (mg) 750 mg New Bag 10/27/20 1336     750 mg New Bag  0108                Microbiologic Results:  Microbiology Results (last 7 days)     Procedure Component Value Units Date/Time    Blood culture x two cultures. Draw prior to antibiotics. [[id number]] Collected: 10/22/20 1545    Order Status: Completed Specimen: Blood from Peripheral, Upper Arm, Right Updated: 10/27/20 2312     Blood Culture, Routine No growth after 5 days.    Narrative:      Aerobic and anaerobic    Blood culture x two cultures. Draw prior to antibiotics. [[id number]] Collected: 10/22/20 1530    Order Status: Completed Specimen: Blood from Peripheral, Upper Arm, Right Updated: 10/27/20 2312     Blood Culture, Routine No growth after 5 days.    Narrative:      Aerobic and anaerobic

## 2020-10-30 NOTE — PROGRESS NOTES
[facility] -   Critical Care Medicine  Progress Note    Patient Name: Glen Moscoso  MRN: [id number]  Admission Date: 10/22/2020  Hospital Length of Stay: 8 days  Code Status: Full Code  Attending Provider: Travis Bobo MD  Primary Care Provider: Yadi Lawson MD   Principal Problem: Decubitus ulcer of ischial area, left, stage IV    Subjective:     HPI:  25 year old male with spastic cerebral palsy, severe contractures, seizure disorder, malnutrition  Presented to ED from LTAC when found face down, unresponsive, with hypoxia  OF NOTE - recent hospitalizations x 2 for pneumonia (serratia), second hospitalization complicated with osteomyelitis from sacral decubitus (underwent surgical I&D) and pneumoperitoneum from suspected colonic perforation that was managed conservatively    On arrival today respiratory rate 50, , sat 89%, SBP 170s, and fever 102  Intubated on ED arrival with anesthesia assistance  Labs revealed leukocytosis, lactic acid 2.3,and procalcitonin 0.12  CT chest abd pelvis revealed - Right lower lobe probable pneumonia.  Osseous erosion could appear similarly but is less favored.  Additional scattered areas of consolidation in the right lung likely also related to infection.  There is a large decubitus ulcer right greater than left. The right ulcer tracks to the posterior aspect of the right inferior pubic ramus. Osteomyelitis cannot be excluded.     Hospital/ICU Course:  Arrived to ICU with OETT to mechanical ventilation, propofol sedation  10/23 - remains intubated on mechanical ventilation; oxygen demand decreased; large amount of thick secretions from trach and oral pharynx  10/24 - Intubated, improved aeration of lung  10/25 - self extubated yesterday; self removed midline overnight  10/26 - weaned to room air; continue drainage from wounds and frequent liquid stool contamination required flexiseal system and wound vac application  10/27 - seen and examined on return from OR  tracheostomy placement; remains sedated on mechanical ventilation via new #8 shiley trach with obturator at head of bed  10/28 - seen and examined on mechanical ventilation and again after transition to trach collar; wound vac contaminated with liquid stool overnight s/t fecal management system leakage, vac changed by WOC today  10/29 - semi-erect in bed eyes open and awake and alert not following commands suspect at baseline.  On blow by O2 to Trach in no distress with VSS awaiting OR for diverting colostomy and TF held.  10/30 - supine in bed eyes open and awake grimacing with colostomy wound care in no distress with VSS S/P Colostomy yesterday      Review of Systems   Unable to perform ROS: Patient nonverbal         Objective:     Vital Signs (Most Recent):  Temp: 98.4 °F (36.9 °C) (10/30/20 0701)  Pulse: 94 (10/30/20 1000)  Resp: (!) 36 (10/30/20 1000)  BP: 109/77 (10/30/20 1000)  SpO2: 100 % (10/30/20 1000) Vital Signs (24h Range):  Temp:  [98.1 °F (36.7 °C)-99 °F (37.2 °C)] 98.4 °F (36.9 °C)  Pulse:  [] 94  Resp:  [18-43] 36  SpO2:  [94 %-100 %] 100 %  BP: ()/(63-91) 109/77     Weight: 32 kg (70 lb 8.8 oz)  Body mass index is 15.27 kg/m².      Intake/Output Summary (Last 24 hours) at 10/30/2020 1014  Last data filed at 10/30/2020 0900  Gross per 24 hour   Intake 1155 ml   Output 1240 ml   Net -85 ml       Physical Exam  Vitals signs and nursing note reviewed.   Constitutional:       General: He is awake. He is not in acute distress.     Appearance: He is underweight. He is ill-appearing. He is not toxic-appearing or diaphoretic.      Interventions: He is not intubated.  HENT:      Head: Normocephalic and atraumatic.      Mouth/Throat:      Mouth: Mucous membranes are dry.   Eyes:      General: Lids are normal.      Pupils: Pupils are equal, round, and reactive to light.   Neck:      Musculoskeletal: Neck supple.      Vascular: No carotid bruit.      Trachea: Trachea normal.     Cardiovascular:       Rate and Rhythm: Normal rate and regular rhythm.      Pulses:           Radial pulses are 1+ on the right side and 1+ on the left side.        Dorsalis pedis pulses are 1+ on the right side and 1+ on the left side.      Heart sounds: Normal heart sounds.   Pulmonary:      Effort: Pulmonary effort is normal. No tachypnea, accessory muscle usage or respiratory distress. He is not intubated.      Breath sounds: Normal breath sounds.   Chest:      Chest wall: No tenderness.   Abdominal:      General: Bowel sounds are decreased. There is distension (mild).      Palpations: Abdomen is soft.      Tenderness: There is no abdominal tenderness.       Genitourinary:     Penis: Normal.       Comments: Iglesias in place  Musculoskeletal:      Right lower leg: No edema.      Left lower leg: No edema.      Right foot: No deformity.      Left foot: No deformity.      Comments: Severe diffuse atrophy with chronic abduction contractures of all 4 extremities.     Lymphadenopathy:      Cervical: No cervical adenopathy.   Skin:     General: Skin is warm and dry.      Capillary Refill: Capillary refill takes less than 2 seconds.      Findings: No rash.      Comments: bilat ischial stg 4 decubiti with wound vac in place   Neurological:      Mental Status: He is alert.      Comments: Eyes open but does not follow commands or track   Psychiatric:         Attention and Perception: He is inattentive.         Vents:  Vent Mode: Spont (10/28/20 0734)  Ventilator Initiated: Yes (10/27/20 0848)  Set Rate: 0 BPM (10/28/20 0734)  Vt Set: 320 mL (10/28/20 0734)  Pressure Support: 10 cmH20 (10/28/20 0734)  PEEP/CPAP: 5 cmH20 (10/28/20 0734)  Oxygen Concentration (%): 21 (10/30/20 0727)  Peak Airway Pressure: 15 cmH2O (10/28/20 0734)  Plateau Pressure: 0 cmH20 (10/28/20 0734)  Total Ve: 7.45 mL (10/28/20 0734)  F/VT Ratio<105 (RSBI): (!) 54.79 (10/28/20 0734)    Lines/Drains/Airways     Peripherally Inserted Central Catheter Line            PICC Double  Lumen 10/22/20 1300 7 days          Drain                 Gastrostomy/Enterostomy 08/04/20 1653 Percutaneous endoscopic gastrostomy (PEG) feeding 86 days         Gastrostomy/Enterostomy 10/22/20 1300 LUQ 7 days         Urethral Catheter 10/22/20 1653 Latex 16 Fr. 7 days         Rectal Tube 10/25/20 1400 rectal tube w/ balloon (indicate number of mLs) 4 days         Colostomy 10/29/20 1349 Descending/sigmoid LLQ less than 1 day          Airway                 Surgical Airway 10/27/20 0855 Shiley Cuffed 3 days                Significant Labs:    CBC/Anemia Profile:  Recent Labs   Lab 10/29/20  0344 10/30/20  0513   WBC 9.75 12.18   HGB 8.8* 9.6*   HCT 29.3* 32.3*   * 511*   MCV 94 94   RDW 14.1 14.1        Chemistries:  Recent Labs   Lab 10/29/20  0344 10/30/20  0513    136   K 3.9 4.1    103   CO2 27 22*   BUN 14 14   CREATININE 0.5 0.7   CALCIUM 8.8 9.1   ALBUMIN 2.1* 2.3*   PROT 6.7 7.3   BILITOT 0.1 0.3   ALKPHOS 155* 153*   ALT 24 22   AST 18 18   MG 2.2 2.0   PHOS 3.0 3.3       All pertinent labs within the past 24 hours have been reviewed.        ABG  Recent Labs   Lab 10/24/20  0451   PH 7.398   PO2 140*   PCO2 40.4   HCO3 24.9   BE 0     Assessment/Plan:     Neuro  Seizure disorder  Continue home dose phenobarbital    Cerebral palsy  continue baclofen, neurontin and Propranolol    ENT  Status post tracheostomy  Post op care  Routine trach care  Family will need teaching on trach care and home medical equipment arranged once ready to transition home from other medical issues  --per ENT, ok to remove sutures after 3d; ok for cuff deflation/change at that point but would avoid cuff deflation - needed for aspiration protection    Derm  * Decubitus ulcer of ischial area, left, stage IV  Wound vacs in place  Cont wound care  POD #1 S/P diverting colostomy   ID following: Will plan to treat with Vancomycin, Cefepime and Flagyl for 6 weeks -EOC -12/09/2020    Pulmonary  Right lower lobe  pneumonia  Recurrent aspiration PNA  S/P Trach this admit  Cont IVAB for Osteo  Aspiration precautions    ID  Osteomyelitis of pelvis, presumed   Transferred recently to LTAC on 10/10 with plan return to LTAC for IV abx   Plan return to LTAC next 48 hours with wound care, wound vac and IVAB  ID recs: Will plan to treat with Vancomycin, Cefepime and Flagyl for 6 weeks -EOC -12/09/2020      Oncology  Anemia, unspecified  Conservative transfusion protocol  No active bleeding  CBC stable    Endocrine  Severe protein-calorie malnutrition  Resume TF        Preventive Measures and Monitoring:   Stress Ulcer: Pepcid  Nutrition: TF  Glucose control: stable  Bowel prophylaxis: Colostomy today  DVT prophylaxis: LMWH/SCDs  Hx CAD on B-Blocker: no hx CAD  Head of Bed/Reposition: Elevate HOB and turn Q1-2 hours   Early Mobility: bed bound  Trach Day: #4  PEG Day: chronic  Central Line right SC Day: #9  Iglesias Day:  #9  IVAB Day: #9  Code Status: Full  Pneumonia Vaccine: ordered  Flu Vaccine: ordered     Counseling/Consultation:I have discussed the care of this patient in detail with the bedside nursing staff and Dr. Block and Dr. Bobo.  Plan return to LTAC once stool noted from colostomy and cleared by GI     Kar Lawson NP  Critical Care Medicine  [facility] [place]

## 2020-10-30 NOTE — SUBJECTIVE & OBJECTIVE
Review of Systems   Unable to perform ROS: Patient nonverbal         Objective:     Vital Signs (Most Recent):  Temp: 98.4 °F (36.9 °C) (10/30/20 0701)  Pulse: 94 (10/30/20 1000)  Resp: (!) 36 (10/30/20 1000)  BP: 109/77 (10/30/20 1000)  SpO2: 100 % (10/30/20 1000) Vital Signs (24h Range):  Temp:  [98.1 °F (36.7 °C)-99 °F (37.2 °C)] 98.4 °F (36.9 °C)  Pulse:  [] 94  Resp:  [18-43] 36  SpO2:  [94 %-100 %] 100 %  BP: ()/(63-91) 109/77     Weight: 32 kg (70 lb 8.8 oz)  Body mass index is 15.27 kg/m².      Intake/Output Summary (Last 24 hours) at 10/30/2020 1014  Last data filed at 10/30/2020 0900  Gross per 24 hour   Intake 1155 ml   Output 1240 ml   Net -85 ml       Physical Exam  Vitals signs and nursing note reviewed.   Constitutional:       General: He is awake. He is not in acute distress.     Appearance: He is underweight. He is ill-appearing. He is not toxic-appearing or diaphoretic.      Interventions: He is not intubated.  HENT:      Head: Normocephalic and atraumatic.      Mouth/Throat:      Mouth: Mucous membranes are dry.   Eyes:      General: Lids are normal.      Pupils: Pupils are equal, round, and reactive to light.   Neck:      Musculoskeletal: Neck supple.      Vascular: No carotid bruit.      Trachea: Trachea normal.     Cardiovascular:      Rate and Rhythm: Normal rate and regular rhythm.      Pulses:           Radial pulses are 1+ on the right side and 1+ on the left side.        Dorsalis pedis pulses are 1+ on the right side and 1+ on the left side.      Heart sounds: Normal heart sounds.   Pulmonary:      Effort: Pulmonary effort is normal. No tachypnea, accessory muscle usage or respiratory distress. He is not intubated.      Breath sounds: Normal breath sounds.   Chest:      Chest wall: No tenderness.   Abdominal:      General: Bowel sounds are decreased. There is distension (mild).      Palpations: Abdomen is soft.      Tenderness: There is no abdominal tenderness.        Genitourinary:     Penis: Normal.       Comments: Iglesias in place  Musculoskeletal:      Right lower leg: No edema.      Left lower leg: No edema.      Right foot: No deformity.      Left foot: No deformity.      Comments: Severe diffuse atrophy with chronic abduction contractures of all 4 extremities.     Lymphadenopathy:      Cervical: No cervical adenopathy.   Skin:     General: Skin is warm and dry.      Capillary Refill: Capillary refill takes less than 2 seconds.      Findings: No rash.      Comments: bilat ischial stg 4 decubiti with wound vac in place   Neurological:      Mental Status: He is alert.      Comments: Eyes open but does not follow commands or track   Psychiatric:         Attention and Perception: He is inattentive.         Vents:  Vent Mode: Spont (10/28/20 0734)  Ventilator Initiated: Yes (10/27/20 0848)  Set Rate: 0 BPM (10/28/20 0734)  Vt Set: 320 mL (10/28/20 0734)  Pressure Support: 10 cmH20 (10/28/20 0734)  PEEP/CPAP: 5 cmH20 (10/28/20 0734)  Oxygen Concentration (%): 21 (10/30/20 0727)  Peak Airway Pressure: 15 cmH2O (10/28/20 0734)  Plateau Pressure: 0 cmH20 (10/28/20 0734)  Total Ve: 7.45 mL (10/28/20 0734)  F/VT Ratio<105 (RSBI): (!) 54.79 (10/28/20 0734)    Lines/Drains/Airways     Peripherally Inserted Central Catheter Line            PICC Double Lumen 10/22/20 1300 7 days          Drain                 Gastrostomy/Enterostomy 08/04/20 1653 Percutaneous endoscopic gastrostomy (PEG) feeding 86 days         Gastrostomy/Enterostomy 10/22/20 1300 LUQ 7 days         Urethral Catheter 10/22/20 1653 Latex 16 Fr. 7 days         Rectal Tube 10/25/20 1400 rectal tube w/ balloon (indicate number of mLs) 4 days         Colostomy 10/29/20 1349 Descending/sigmoid LLQ less than 1 day          Airway                 Surgical Airway 10/27/20 0855 Shiley Cuffed 3 days                Significant Labs:    CBC/Anemia Profile:  Recent Labs   Lab 10/29/20  0344 10/30/20  0513   WBC 9.75 12.18   HGB  8.8* 9.6*   HCT 29.3* 32.3*   * 511*   MCV 94 94   RDW 14.1 14.1        Chemistries:  Recent Labs   Lab 10/29/20  0344 10/30/20  0513    136   K 3.9 4.1    103   CO2 27 22*   BUN 14 14   CREATININE 0.5 0.7   CALCIUM 8.8 9.1   ALBUMIN 2.1* 2.3*   PROT 6.7 7.3   BILITOT 0.1 0.3   ALKPHOS 155* 153*   ALT 24 22   AST 18 18   MG 2.2 2.0   PHOS 3.0 3.3       All pertinent labs within the past 24 hours have been reviewed.

## 2020-10-30 NOTE — SUBJECTIVE & OBJECTIVE
Interval History: No acute events overnight. Gtube leaking. Ostomy swollen and edematous.     Medications:  Continuous Infusions:   dextrose 5 % and 0.45 % NaCl 50 mL/hr at 10/30/20 1435     Scheduled Meds:   baclofen  10 mg Per G Tube TID    ceFEPime (MAXIPIME) IVPB  2 g Intravenous Q8H    chlorhexidine  15 mL Mouth/Throat BID    cholestyramine  1 packet Per G Tube BID    enoxaparin  30 mg Subcutaneous Q24H    famotidine (PF)  20 mg Intravenous BID    gabapentin  250 mg Per G Tube QHS    metroNIDAZOLE  500 mg Per G Tube Q8H    miconazole nitrate 2%   Topical (Top) BID    PHENobarbitaL  100 mg Per G Tube QHS    propranoloL  30 mg Per G Tube TID    sodium chloride 3%  4 mL Nebulization Q8H    vancomycin (VANCOCIN) IVPB  500 mg Intravenous Q12H     PRN Meds:acetaminophen, albuterol-ipratropium, bisacodyL, influenza, morphine, pneumoc 13-robby conj-dip cr(PF)     Review of patient's allergies indicates:  No Known Allergies  Objective:     Vital Signs (Most Recent):  Temp: 98.8 °F (37.1 °C) (10/30/20 1101)  Pulse: 100 (10/30/20 1400)  Resp: (!) 25 (10/30/20 1400)  BP: 110/72 (10/30/20 1400)  SpO2: 100 % (10/30/20 1400) Vital Signs (24h Range):  Temp:  [98.1 °F (36.7 °C)-99 °F (37.2 °C)] 98.8 °F (37.1 °C)  Pulse:  [] 100  Resp:  [18-43] 25  SpO2:  [90 %-100 %] 100 %  BP: ()/() 110/72     Weight: 32 kg (70 lb 8.8 oz)  Body mass index is 15.27 kg/m².    Intake/Output - Last 3 Shifts       10/28 0700 - 10/29 0659 10/29 0700 - 10/30 0659 10/30 0700 - 10/31 0659    P.O. 120      I.V. (mL/kg)  500 (15.6)     NG/GT 1640 415     IV Piggyback 250 350 50    Total Intake(mL/kg) 2010 (58.6) 1265 (39.5) 50 (1.6)    Urine (mL/kg/hr) 1735 (2.1) 1340 (1.7) 305 (1.2)    Other 65 50     Stool 250 80 70    Blood  5     Total Output 2050 1475 375    Net -40 -210 -325           Stool Occurrence 2 x 1 x           Physical Exam  Constitutional:       Appearance: He is well-developed.   HENT:      Head: Atraumatic.    Eyes:      Conjunctiva/sclera: Conjunctivae normal.   Neck:      Thyroid: No thyromegaly.   Cardiovascular:      Rate and Rhythm: Regular rhythm.   Pulmonary:      Comments: Trach in place  Abdominal:      Comments: Gtube in place in LUQ; incisions c/d/i; ostomy beefy red and edematous with bowel sweat in bag   Musculoskeletal:         General: No tenderness.   Skin:     General: Skin is warm and dry.      Capillary Refill: Capillary refill takes less than 2 seconds.      Comments: +bilateral decubitus posterior ulcers with wound vacs in place with good seal/sxn   Neurological:      Mental Status: He is alert. Mental status is at baseline.         Significant Labs:  CBC:   Recent Labs   Lab 10/30/20  0513   WBC 12.18   RBC 3.43*   HGB 9.6*   HCT 32.3*   *   MCV 94   MCH 28.0   MCHC 29.7*     BMP:   Recent Labs   Lab 10/30/20  0513   *      K 4.1      CO2 22*   BUN 14   CREATININE 0.7   CALCIUM 9.1   MG 2.0

## 2020-10-30 NOTE — NURSING
Order received to use new PEG tube. Flushes without difficulty. Nutren 1.5 tube feedings restarted at 15cc/hour; will advance to goal rate of 40cc/hour as tolerated. Will continue to monitor.

## 2020-10-30 NOTE — PLAN OF CARE
Patient is alert, non-verbal, localizes to pain. Trach collar with O2 saturation at 100%. Sinus rhythm, HR 95. Tube feeding were started at 2100, at 2300 tube feedings turned off, leaking from G-button. LLQ colostomy stoma is beefy red and swollen. Wound vac to bilateral buttocks on continuous suction, dressings dry and intact. POC reviewed with mother. Bed is locked in lowest position, bed alarm set, side rails up x2.

## 2020-10-30 NOTE — PROGRESS NOTES
[facility] -   Colorectal Surgery  Progress Note    Subjective:     History of Present Illness:    25-year-old male with a history of cerebral palsy who is admitted to the ICU with respiratory failure and pneumonia.  Patient has had known bilateral decubitus ulcers that have been difficult to heal with osteomyelitis.  Patient has had continued loose stools and difficulty the diverting the stool away from the wounds even with wound VAC therapy.  During this admission he has also undergone a tracheostomy by ENT.  Colorectal surgery is consulted for evaluation for possible diverting colostomy given the difficulty with diarrhea and wound contamination from stool as the wounds are so close to his anus.  Per his mother who is his primary caregiver and surrogate decision maker, the patient has had a feeding tube placed and his abdomen.  Patient is nonverbal.    Post-Op Info:  Procedure(s) (LRB):  CREATION, COLOSTOMY, LAPAROSCOPIC (N/A)   1 Day Post-Op     Interval History: No acute events overnight. Gtube leaking. Ostomy swollen and edematous.     Medications:  Continuous Infusions:   dextrose 5 % and 0.45 % NaCl 50 mL/hr at 10/30/20 1435     Scheduled Meds:   baclofen  10 mg Per G Tube TID    ceFEPime (MAXIPIME) IVPB  2 g Intravenous Q8H    chlorhexidine  15 mL Mouth/Throat BID    cholestyramine  1 packet Per G Tube BID    enoxaparin  30 mg Subcutaneous Q24H    famotidine (PF)  20 mg Intravenous BID    gabapentin  250 mg Per G Tube QHS    metroNIDAZOLE  500 mg Per G Tube Q8H    miconazole nitrate 2%   Topical (Top) BID    PHENobarbitaL  100 mg Per G Tube QHS    propranoloL  30 mg Per G Tube TID    sodium chloride 3%  4 mL Nebulization Q8H    vancomycin (VANCOCIN) IVPB  500 mg Intravenous Q12H     PRN Meds:acetaminophen, albuterol-ipratropium, bisacodyL, influenza, morphine, pneumoc 13-robby conj-dip cr(PF)     Review of patient's allergies indicates:  No Known Allergies  Objective:     Vital  Signs (Most Recent):  Temp: 98.8 °F (37.1 °C) (10/30/20 1101)  Pulse: 100 (10/30/20 1400)  Resp: (!) 25 (10/30/20 1400)  BP: 110/72 (10/30/20 1400)  SpO2: 100 % (10/30/20 1400) Vital Signs (24h Range):  Temp:  [98.1 °F (36.7 °C)-99 °F (37.2 °C)] 98.8 °F (37.1 °C)  Pulse:  [] 100  Resp:  [18-43] 25  SpO2:  [90 %-100 %] 100 %  BP: ()/() 110/72     Weight: 32 kg (70 lb 8.8 oz)  Body mass index is 15.27 kg/m².    Intake/Output - Last 3 Shifts       10/28 0700 - 10/29 0659 10/29 0700 - 10/30 0659 10/30 0700 - 10/31 0659    P.O. 120      I.V. (mL/kg)  500 (15.6)     NG/GT 1640 415     IV Piggyback 250 350 50    Total Intake(mL/kg) 2010 (58.6) 1265 (39.5) 50 (1.6)    Urine (mL/kg/hr) 1735 (2.1) 1340 (1.7) 305 (1.2)    Other 65 50     Stool 250 80 70    Blood  5     Total Output 2050 1475 375    Net -40 -210 -325           Stool Occurrence 2 x 1 x           Physical Exam  Constitutional:       Appearance: He is well-developed.   HENT:      Head: Atraumatic.   Eyes:      Conjunctiva/sclera: Conjunctivae normal.   Neck:      Thyroid: No thyromegaly.   Cardiovascular:      Rate and Rhythm: Regular rhythm.   Pulmonary:      Comments: Trach in place  Abdominal:      Comments: Gtube in place in LUQ; incisions c/d/i; ostomy beefy red and edematous with bowel sweat in bag   Musculoskeletal:         General: No tenderness.   Skin:     General: Skin is warm and dry.      Capillary Refill: Capillary refill takes less than 2 seconds.      Comments: +bilateral decubitus posterior ulcers with wound vacs in place with good seal/sxn   Neurological:      Mental Status: He is alert. Mental status is at baseline.         Significant Labs:  CBC:   Recent Labs   Lab 10/30/20  0513   WBC 12.18   RBC 3.43*   HGB 9.6*   HCT 32.3*   *   MCV 94   MCH 28.0   MCHC 29.7*     BMP:   Recent Labs   Lab 10/30/20  0513   *      K 4.1      CO2 22*   BUN 14   CREATININE 0.7   CALCIUM 9.1   MG 2.0      Assessment/Plan:     * Decubitus ulcer of ischial area, left, stage IV  Stage IV bilateral decub ulcers with diarrhea and stool contamination now s/p lap colostomy construction on 10/29/2020    - cont WCON education and care for colostomy  - okay for TFs from surgical standpoint    Leaking PEG tube  Eval via tube study and replace if there is ongoing leakage and/or defect seen    Status post tracheostomy  Management per primary team    Right lower lobe pneumonia  Management per primary team    Osteomyelitis of pelvis, presumed   Management per primary team    Severe protein-calorie malnutrition  Management per primary team    Seizure disorder  Management per primary team    Cerebral palsy  Management per primary team        Michael Ortez MD  Colorectal Surgery  [facility] -

## 2020-10-30 NOTE — NURSING
Patient's stoma is large and swollen. Images linked, Dr. Ortez messaged. Dr. Carroll at bedside with patient. Reports to continue monitoring.

## 2020-10-30 NOTE — PLAN OF CARE
Pt's VS stable throughout day. Afebrile; normotensive. NSR to sinus tach. SaO2 >=92%; trach collar room air. Iglesias in place; adequate UOP. Flexiseal rectal tube removed. LLQ ABD colostomy in place; pouch/dressing changed per wound care nurse; beefy, red stoma; no stool output yet; bowel sweat. MD verbalized stoma looks okay, just swollen. G tube leaking upon initial assessment; MD's made aware; G tube replaced per IR; no issues with new tube; tube feedings restarted. Wound vac to bilateral buttock wounds to continuous suction; dressings C/D/I; changed per wound care nurse. Pt nonverbal; unable to assess orientation. Bilateral soft mittens in place with current order; no injuries noted. Pt currently resting, will continue to monitor.

## 2020-10-30 NOTE — TELEPHONE ENCOUNTER
Interventional Radiology:  Received order for FL PEG replacement.  I called Mom Leni, she said he has had a PEG since he was 4 yo - placed by [facility] - Dr. Cash.  JO Triana spoke with Mother and received phone consent.  Mother gave a me a # where he gets his supplies - [phone]

## 2020-10-30 NOTE — ASSESSMENT & PLAN NOTE
Wound vacs in place  Cont wound care  POD #1 S/P diverting colostomy   ID following: Will plan to treat with Vancomycin, Cefepime and Flagyl for 6 weeks -EOC -12/09/2020

## 2020-10-31 PROBLEM — Z93.3 STATUS POST COLOSTOMY: Status: ACTIVE | Noted: 2020-10-31

## 2020-10-31 LAB
ANION GAP SERPL CALC-SCNC: 13 MMOL/L (ref 8–16)
ANION GAP SERPL CALC-SCNC: 9 MMOL/L (ref 8–16)
BUN SERPL-MCNC: 18 MG/DL (ref 6–20)
BUN SERPL-MCNC: 18 MG/DL (ref 6–20)
CALCIUM SERPL-MCNC: 9.4 MG/DL (ref 8.7–10.5)
CALCIUM SERPL-MCNC: 9.4 MG/DL (ref 8.7–10.5)
CHLORIDE SERPL-SCNC: 104 MMOL/L (ref 95–110)
CHLORIDE SERPL-SCNC: 109 MMOL/L (ref 95–110)
CO2 SERPL-SCNC: 17 MMOL/L (ref 23–29)
CO2 SERPL-SCNC: 19 MMOL/L (ref 23–29)
CREAT SERPL-MCNC: 0.7 MG/DL (ref 0.5–1.4)
CREAT SERPL-MCNC: 0.7 MG/DL (ref 0.5–1.4)
EST. GFR  (AFRICAN AMERICAN): >60 ML/MIN/1.73 M^2
EST. GFR  (AFRICAN AMERICAN): >60 ML/MIN/1.73 M^2
EST. GFR  (NON AFRICAN AMERICAN): >60 ML/MIN/1.73 M^2
EST. GFR  (NON AFRICAN AMERICAN): >60 ML/MIN/1.73 M^2
GLUCOSE SERPL-MCNC: 162 MG/DL (ref 70–110)
GLUCOSE SERPL-MCNC: 190 MG/DL (ref 70–110)
MAGNESIUM SERPL-MCNC: 2 MG/DL (ref 1.6–2.6)
PHOSPHATE SERPL-MCNC: 3.1 MG/DL (ref 2.7–4.5)
POTASSIUM SERPL-SCNC: 2.6 MMOL/L (ref 3.5–5.1)
POTASSIUM SERPL-SCNC: 3.2 MMOL/L (ref 3.5–5.1)
SODIUM SERPL-SCNC: 135 MMOL/L (ref 136–145)
SODIUM SERPL-SCNC: 136 MMOL/L (ref 136–145)

## 2020-10-31 PROCEDURE — 63600175 PHARM REV CODE 636 W HCPCS: Performed by: COLON & RECTAL SURGERY

## 2020-10-31 PROCEDURE — 25000003 PHARM REV CODE 250: Performed by: COLON & RECTAL SURGERY

## 2020-10-31 PROCEDURE — S0028 INJECTION, FAMOTIDINE, 20 MG: HCPCS | Performed by: INTERNAL MEDICINE

## 2020-10-31 PROCEDURE — 80048 BASIC METABOLIC PNL TOTAL CA: CPT

## 2020-10-31 PROCEDURE — 25000003 PHARM REV CODE 250: Performed by: INTERNAL MEDICINE

## 2020-10-31 PROCEDURE — 94640 AIRWAY INHALATION TREATMENT: CPT

## 2020-10-31 PROCEDURE — 99900035 HC TECH TIME PER 15 MIN (STAT)

## 2020-10-31 PROCEDURE — 25000242 PHARM REV CODE 250 ALT 637 W/ HCPCS: Performed by: COLON & RECTAL SURGERY

## 2020-10-31 PROCEDURE — 83735 ASSAY OF MAGNESIUM: CPT

## 2020-10-31 PROCEDURE — 25000003 PHARM REV CODE 250: Performed by: NURSE PRACTITIONER

## 2020-10-31 PROCEDURE — 63600175 PHARM REV CODE 636 W HCPCS: Performed by: INTERNAL MEDICINE

## 2020-10-31 PROCEDURE — 94761 N-INVAS EAR/PLS OXIMETRY MLT: CPT

## 2020-10-31 PROCEDURE — 80048 BASIC METABOLIC PNL TOTAL CA: CPT | Mod: 91

## 2020-10-31 PROCEDURE — 63600175 PHARM REV CODE 636 W HCPCS: Performed by: FAMILY MEDICINE

## 2020-10-31 PROCEDURE — 36415 COLL VENOUS BLD VENIPUNCTURE: CPT

## 2020-10-31 PROCEDURE — 84100 ASSAY OF PHOSPHORUS: CPT

## 2020-10-31 PROCEDURE — 99900026 HC AIRWAY MAINTENANCE (STAT)

## 2020-10-31 PROCEDURE — 21400001 HC TELEMETRY ROOM

## 2020-10-31 RX ORDER — POTASSIUM CHLORIDE 14.9 MG/ML
40 INJECTION INTRAVENOUS EVERY 4 HOURS
Status: COMPLETED | OUTPATIENT
Start: 2020-10-31 | End: 2020-10-31

## 2020-10-31 RX ORDER — POTASSIUM CHLORIDE 1.5 G/1.58G
40 POWDER, FOR SOLUTION ORAL ONCE
Status: COMPLETED | OUTPATIENT
Start: 2020-10-31 | End: 2020-10-31

## 2020-10-31 RX ORDER — FAMOTIDINE 20 MG/50ML
20 INJECTION, SOLUTION INTRAVENOUS 2 TIMES DAILY
Status: DISCONTINUED | OUTPATIENT
Start: 2020-10-31 | End: 2020-11-02

## 2020-10-31 RX ADMIN — PHENOBARBITAL 100 MG: 20 ELIXIR ORAL at 10:10

## 2020-10-31 RX ADMIN — BACLOFEN 10 MG: 10 TABLET ORAL at 09:10

## 2020-10-31 RX ADMIN — SODIUM CHLORIDE 30 MG/ML INHALATION SOLUTION 4 ML: 30 SOLUTION INHALANT at 07:10

## 2020-10-31 RX ADMIN — FAMOTIDINE 20 MG: 20 INJECTION, SOLUTION INTRAVENOUS at 10:10

## 2020-10-31 RX ADMIN — VANCOMYCIN HYDROCHLORIDE 500 MG: 500 INJECTION, POWDER, LYOPHILIZED, FOR SOLUTION INTRAVENOUS at 03:10

## 2020-10-31 RX ADMIN — METRONIDAZOLE 500 MG: 500 TABLET ORAL at 06:10

## 2020-10-31 RX ADMIN — CHOLESTYRAMINE 4 G: 4 POWDER, FOR SUSPENSION ORAL at 08:10

## 2020-10-31 RX ADMIN — POTASSIUM CHLORIDE 40 MEQ: 14.9 INJECTION, SOLUTION INTRAVENOUS at 06:10

## 2020-10-31 RX ADMIN — CHLORHEXIDINE GLUCONATE 0.12% ORAL RINSE 15 ML: 1.2 LIQUID ORAL at 09:10

## 2020-10-31 RX ADMIN — POTASSIUM CHLORIDE 40 MEQ: 1.5 POWDER, FOR SOLUTION ORAL at 07:10

## 2020-10-31 RX ADMIN — CHOLESTYRAMINE 4 G: 4 POWDER, FOR SUSPENSION ORAL at 09:10

## 2020-10-31 RX ADMIN — CEFEPIME HYDROCHLORIDE 2 G: 2 INJECTION, SOLUTION INTRAVENOUS at 04:10

## 2020-10-31 RX ADMIN — FAMOTIDINE 20 MG: 10 INJECTION INTRAVENOUS at 08:10

## 2020-10-31 RX ADMIN — SODIUM CHLORIDE 30 MG/ML INHALATION SOLUTION 4 ML: 30 SOLUTION INHALANT at 03:10

## 2020-10-31 RX ADMIN — PROPRANOLOL HYDROCHLORIDE 30 MG: 20 SOLUTION ORAL at 08:10

## 2020-10-31 RX ADMIN — POTASSIUM CHLORIDE 40 MEQ: 14.9 INJECTION, SOLUTION INTRAVENOUS at 09:10

## 2020-10-31 RX ADMIN — CEFEPIME HYDROCHLORIDE 2 G: 2 INJECTION, SOLUTION INTRAVENOUS at 07:10

## 2020-10-31 RX ADMIN — BACLOFEN 10 MG: 10 TABLET ORAL at 02:10

## 2020-10-31 RX ADMIN — PROPRANOLOL HYDROCHLORIDE 30 MG: 20 SOLUTION ORAL at 02:10

## 2020-10-31 RX ADMIN — ENOXAPARIN SODIUM 30 MG: 100 INJECTION SUBCUTANEOUS at 04:10

## 2020-10-31 RX ADMIN — MICONAZOLE NITRATE: 2 OINTMENT TOPICAL at 09:10

## 2020-10-31 RX ADMIN — VANCOMYCIN HYDROCHLORIDE 500 MG: 500 INJECTION, POWDER, LYOPHILIZED, FOR SOLUTION INTRAVENOUS at 04:10

## 2020-10-31 RX ADMIN — MICONAZOLE NITRATE: 2 OINTMENT TOPICAL at 08:10

## 2020-10-31 RX ADMIN — MORPHINE SULFATE 2 MG: 2 INJECTION, SOLUTION INTRAMUSCULAR; INTRAVENOUS at 11:10

## 2020-10-31 RX ADMIN — CHLORHEXIDINE GLUCONATE 0.12% ORAL RINSE 15 ML: 1.2 LIQUID ORAL at 08:10

## 2020-10-31 RX ADMIN — PROPRANOLOL HYDROCHLORIDE 30 MG: 20 SOLUTION ORAL at 09:10

## 2020-10-31 RX ADMIN — BACLOFEN 10 MG: 10 TABLET ORAL at 08:10

## 2020-10-31 RX ADMIN — METRONIDAZOLE 500 MG: 500 TABLET ORAL at 02:10

## 2020-10-31 RX ADMIN — METRONIDAZOLE 500 MG: 500 TABLET ORAL at 09:10

## 2020-10-31 RX ADMIN — GABAPENTIN 250 MG: 250 SUSPENSION ORAL at 10:10

## 2020-10-31 NOTE — NURSING TRANSFER
Nursing Transfer Note      10/31/2020     Transfer To: Telemetry room 238; from ICU room 2    Transfer via bed    Transfer with cardiac monitoring    Transported by MICKEY Verma RN    Medicines sent: multiple; given to receiving RN    Chart send with patient: Yes    Notified: Mother via phone call        Upon arrival to floor: cardiac monitor applied, patient oriented to room, call bell in reach and bed in lowest position; receiving RN jarret at bedside.

## 2020-10-31 NOTE — NURSING
Pt arrived to unit from ICU in no acute distress. VSS, agree with previous assessment done. Tele monitor in place. Phone report from ICU nurse ROBBIN Lucas  prior to pt's transfer. Bed low, wheels locked, side rails up x2, call light in reach, bed alarm armed. Family not present at bedside. Pt nonverbal, involuntary spontaneous movements. Iglesias catheter, Colostomy, Trach, PEG, Wound Vacc, soft mitt restraints all in place. Will cont to monitor accordingly

## 2020-10-31 NOTE — ASSESSMENT & PLAN NOTE
Wound Care  Antibiotics per ID  PICC in place  Possible osteomyelitis   Wound vac placed   General Surgery consult to evaluate for diverting colostomy to prevent fecal contamination of wound.   10/29-   Patient to go to OR for diverting colostomy today  10/30 -  s/p Laparoscopic sigmoid loop colostomy construction performed yesterday  10/31-  Antibiotic continues per ID recs . Disposition - [facility]

## 2020-10-31 NOTE — PLAN OF CARE
[facility] -   ICU Shift Summary  Date: 10/31/2020      Isolation: No active isolations     Admit Date / LOS : 10/22/2020/ 9 days    Diagnosis: Decubitus ulcer of ischial area, left, stage IV    Code Status: Full Code   Advanced Directive: <no information>    LDA: Iglesias, Ostomy, TLC, Trach and Wound Vac       Central Lines/Site/Justification:Unable to Obtain/Maintain PIV       Urinary Cath/Order/Justification:Non Healing Sacral/Perineal Wound    Vasopressors/Infusions:        GOALS: Volume/ Hemodynamic: N/A                     RASS: 0  alert and calm    Pain Management: none       Pain Controlled: yes     Rhythm: NSR and ST    Respiratory Device: trach T- piece  Oxygen Concentration (%):  [21] 21             Most Recent SBT/ SAT: N/A       MOVE Screen: FAIL    VTE Prophylaxis: Pharm  Mobility: Bedrest  Stress Ulcer Prophylaxis: Yes    Dietary: TF  Tolerance: yes  /  Advancement: @ goal    I & O (24h):    Intake/Output Summary (Last 24 hours) at 10/31/2020 0630  Last data filed at 10/31/2020 0615  Gross per 24 hour   Intake 1180.84 ml   Output 875 ml   Net 305.84 ml        Restraints: Yes    Noteworthy Labs:  Critical potassium 2.6 notified Dr. Carroll.  Ordered 40 mEq potassium IV    CBC/Anemia Labs: Coags:    Recent Labs   Lab 10/29/20  0344 10/30/20  0513   WBC 9.75 12.18   HGB 8.8* 9.6*   HCT 29.3* 32.3*   * 511*   MCV 94 94   RDW 14.1 14.1    No results for input(s): PT, INR, APTT in the last 168 hours.     Chemistries:   Recent Labs   Lab 10/29/20  0344 10/30/20  0513 10/31/20  0425    136 136   K 3.9 4.1 2.6*    103 104   CO2 27 22* 19*   BUN 14 14 18   CREATININE 0.5 0.7 0.7   CALCIUM 8.8 9.1 9.4   PROT 6.7 7.3  --    BILITOT 0.1 0.3  --    ALKPHOS 155* 153*  --    ALT 24 22  --    AST 18 18  --    MG 2.2 2.0 2.0   PHOS 3.0 3.3 3.1        Cardiac Enzymes: Ejection Fractions:    No results for input(s): CPK, CPKMB, MB, TROPONINI in the last 72 hours. No results found for: EF      POCT Glucose: HbA1c:    No results for input(s): POCTGLUCOSE in the last 168 hours. Hemoglobin A1C   Date Value Ref Range Status   08/26/2020 5.5 4.0 - 5.6 % Final     Comment:     ADA Screening Guidelines:  5.7-6.4%  Consistent with prediabetes  >or=6.5%  Consistent with diabetes  High levels of fetal hemoglobin interfere with the HbA1C  assay. Heterozygous hemoglobin variants (HbS, HgC, etc)do  not significantly interfere with this assay.   However, presence of multiple variants may affect accuracy.             ICU LOS 8d 12h  Level of Care: OK to Transfer

## 2020-10-31 NOTE — PROGRESS NOTES
The patient was seen and examined.  The ileostomy site is edematous but pink H with good circulation.  Stoma sweat is present in the back.  Bowel sounds active.  Abdomen is distended.    Will continue with the current management.  If his abdominal distention is worse, will proceed with getting flat abdominal x-ray tomorrow morning.    Alireza Barry

## 2020-10-31 NOTE — PROGRESS NOTES
[facility] Hospital Medicine  Progress Note    Patient Name: Glen Moscoso  MRN: [id number]  Patient Class: IP- Inpatient   Admission Date: 10/22/2020  Length of Stay: 9 days  Attending Physician: Travis Bobo MD  Primary Care Provider: Yadi Lawson MD        Subjective:     Principal Problem:Decubitus ulcer of ischial area, left, stage IV        HPI:  Glen Moscoso is a 25 y.o. male patient with a PMHx of cerebral palsy who presents to the Emergency Department for evaluation of respiratory distress which onset PTA. EMS reports pt was found face down on the pillow in nursing home. Pt arrives tachycardic and tachypneic. Patient recently discharge from [facility] where he was treated for respiratory failure and sepsis. Patient was discharged on 10/10/20 to [facility] on IV Vancomycin, Cefepime and Flagyl to treat pelvic osteomyelitis and resolving colitis. In the ED, WBCs 18.7K, Platelet 681, CO2 20, Lactate 2.3. UA negative. CXR revealed mild atelectasis or infiltrate right perihilar region and right midlung zone. Pt with worsening resp failure Subsequently pt was intubated. Sepsis protocol initiated in the ED including IV hydration. Hospital medicine called for admission. Patient placed in ICU. Patient is a full code, SDM mother, Leni Moscoso at [phone].     Overview/Hospital Course:  10/23  Remains intubated , enteral nutrition initiated. Continued treatment of sepsis due to aspiration pneumonia .due to poor air way protection .  Cultures are with negative growth to date. Temp Max 103 and wbc 19.30   Tachycardia was reported over night , follow up Echo EF 50 % normal diastolic function.  Case discussed with Dr Palma who has agreed for trach placement due to recurrent aspiration pneumonia.    10/24    Remains intubated, Tmax of 101.5 .CT Abd/pelvis - right inferior pubic ramus -cannot exclude     Osteomyelitis.      10/25   Self extubated, presently on Room air    Case discussed with  Dr Palma who has agreed for trach placement due to recurrent aspiration pneumonia.  10/26- Afebrile. Remains on RA with satisfactory SpO2. Trach placement planned for tomorrow . Wound vac placed to Rt./Lt ischium stage IV pressure ulcers with full thickness tissue loss with exposed bone. General Surgery consulted  to evaluate pt for diverting colostomy to prevent fecal contamination of wound . Rectal tube in place. Labs- WBC normalize 10.1, Hbg 9.2, Pl 512, creatinine 0.5   10/27- S/P tracheostomy placement today. Remains sedated on mechanical ventilation via  trach . Labs are unremarkable . General Surgery is recommending to continue wound vac and rectal tube for now , diverting colostomy is not suggested at this time.   10/28- Tmax 99. Transition to trach collar . SpO2 100% on O2 at 5 L/min . No distress noted . Per nursing staff liquid stool sips around Rectal tube . Wound vac contaminated with liquid stool . Spoke to Dr. Ortez and he will evaluate pt for diverting colostomy . ID consult obtained and recs are noted. Add Questran powder for loose stool. Labs are unremarkable.   10/29- Afebrile . Trach collar in place. Patient to go to OR for diverting colostomy today. Antibiotic continues.   10/30- s/p Laparoscopic sigmoid loop colostomy construction performed yesterday. Leakage noted around G-tube . Discussed with Dr. Ortez and recommend Fluro gastric tube study . Hold PEG feeding for now.   10/31- Afebrile . Appears tachypnic . Trach collar in place . CXR this morning resulted clear lungs . SpO2 is satisfactory on FiO2 21%.  Harsha button replaced with a new G tube yesterday by IR due to leakage. Per IR, the G tube may be exchanged for a harsha button at a future date . Abdomen appears distended , however good bowel sounds appreciated . LLQ colostomy stoma is beefy red with dark  liquid noted in the colostomy bag. Antibiotic continues for stage IV decubitus ulcer and presumed  Osteomyelitis. Disposition - [facility].      Interval History:  Disposition - [facility]     Review of Systems   Unable to perform ROS: Patient nonverbal     Objective:     Vital Signs (Most Recent):  Temp: 98.9 °F (37.2 °C) (10/31/20 1101)  Pulse: 106 (10/31/20 1304)  Resp: (!) 44 (10/31/20 1304)  BP: (!) 125/95 (10/31/20 1300)  SpO2: 97 % (10/31/20 1304) Vital Signs (24h Range):  Temp:  [98.1 °F (36.7 °C)-100.4 °F (38 °C)] 98.9 °F (37.2 °C)  Pulse:  [100-119] 106  Resp:  [12-51] 44  SpO2:  [96 %-100 %] 97 %  BP: (110-144)/() 125/95     Weight: 33 kg (72 lb 12 oz)  Body mass index is 15.74 kg/m².    Intake/Output Summary (Last 24 hours) at 10/31/2020 1337  Last data filed at 10/31/2020 1300  Gross per 24 hour   Intake 2155.84 ml   Output 715 ml   Net 1440.84 ml      Physical Exam  Constitutional:       General: He is not in acute distress.     Appearance: He is well-developed. He is not diaphoretic.      Comments: Awake , nonverbal.    HENT:      Head: Normocephalic and atraumatic.      Mouth/Throat:      Pharynx: No oropharyngeal exudate.   Eyes:      Conjunctiva/sclera: Conjunctivae normal.      Pupils: Pupils are equal, round, and reactive to light.   Neck:      Musculoskeletal: Neck supple.      Thyroid: No thyromegaly.      Vascular: No JVD.   Cardiovascular:      Rate and Rhythm: Regular rhythm. Tachycardia present.      Heart sounds: Normal heart sounds. No murmur.   Pulmonary:      Effort: Pulmonary effort is normal. No respiratory distress.      Breath sounds: Normal breath sounds. No wheezing or rales.      Comments: Tachypnea , bronchial breath sounds   Chest:      Chest wall: No tenderness.   Abdominal:      General: Bowel sounds are normal. There is distension.      Palpations: Abdomen is soft.      Tenderness: There is no abdominal tenderness. There is no guarding or rebound.      Comments: Colostomy LLQ   Lymphadenopathy:      Cervical: No cervical adenopathy.   Skin:     General: Skin is warm and dry.      Findings: No rash.       Comments: Wound vac  in place    Neurological:      Comments: Awake , nonverbal         Significant Labs:   BMP:   Recent Labs   Lab 10/31/20  0425   *      K 2.6*      CO2 19*   BUN 18   CREATININE 0.7   CALCIUM 9.4   MG 2.0     CBC:   Recent Labs   Lab 10/30/20  0513   WBC 12.18   HGB 9.6*   HCT 32.3*   *       Significant Imaging:       Assessment/Plan:      * Decubitus ulcer of ischial area, left, stage IV  Wound Care  Antibiotics per ID  PICC in place  Possible osteomyelitis   Wound vac placed   General Surgery consult to evaluate for diverting colostomy to prevent fecal contamination of wound.   10/29-   Patient to go to OR for diverting colostomy today  10/30 -  s/p Laparoscopic sigmoid loop colostomy construction performed yesterday  10/31-  Antibiotic continues per ID recs . Disposition - [facility]         Osteomyelitis of pelvis, presumed   ID recommended 6 weeks therapy  with Vancomycin, Cefepime and Flagyl  -EOC -12/09/2020      Leaking PEG tube  10/30-  Fluro Gastric tube study today.   10/31-  Harsha button replaced with a new G tube yesterday by IR due to leakage. Per IR, the G tube may be exchanged for a harsha button at a future date         Right lower lobe pneumonia  Recurrent Aspiration Pneumonia  S/P Trach placement 10/27/20  PEG tube for feeding and medication administration   Continue antibiotic for 5 to 7 days   ID consult obtained . Antibiotic extended x 6 weeks for presumed pelvic osteomyelitis     Status post tracheostomy  -Trach placed and transition to trach collar       Tachycardia  Due to infection  ABX  Symptomatic care  Monitor  Currently controlled with BB         Severe protein-calorie malnutrition  Enteral tube feeds   Continue supplements        Anemia, unspecified  - Anemia of chronic disease   -Monitor H/H and signs of active bleed.  -Transfuse P-RBC for Hgb 7 or under       Seizure disorder  Phenobarbital  Neuro checks    Cerebral palsy  Supportive  care       VTE Risk Mitigation (From admission, onward)         Ordered     enoxaparin injection 30 mg  Every 24 hours      10/27/20 1517     IP VTE HIGH RISK PATIENT  Once      10/22/20 1825     Place sequential compression device  Until discontinued      10/22/20 1754                Discharge Planning   ROCÍO:      Code Status: Full Code   Is the patient medically ready for discharge?:     Reason for patient still in hospital (select all that apply): Pending disposition  Discharge Plan A: Long-term acute care facility (LTAC)   Discharge Delays: (!) Other(awaiting colostomy output)        Critical care time spent on the evaluation and treatment of severe organ dysfunction, review of pertinent labs and imaging studies, discussions with consulting providers and discussions with patient/family: 30  minutes.      Travis Bobo MD  Department of Hospital Medicine   [facility]

## 2020-10-31 NOTE — ASSESSMENT & PLAN NOTE
10/30-  Fluro Gastric tube study today.   10/31-  Harsha button replaced with a new G tube yesterday by IR due to leakage. Per IR, the G tube may be exchanged for a harsha button at a future date

## 2020-10-31 NOTE — SUBJECTIVE & OBJECTIVE
Interval History:  Disposition - [facility]     Review of Systems   Unable to perform ROS: Patient nonverbal     Objective:     Vital Signs (Most Recent):  Temp: 98.9 °F (37.2 °C) (10/31/20 1101)  Pulse: 106 (10/31/20 1304)  Resp: (!) 44 (10/31/20 1304)  BP: (!) 125/95 (10/31/20 1300)  SpO2: 97 % (10/31/20 1304) Vital Signs (24h Range):  Temp:  [98.1 °F (36.7 °C)-100.4 °F (38 °C)] 98.9 °F (37.2 °C)  Pulse:  [100-119] 106  Resp:  [12-51] 44  SpO2:  [96 %-100 %] 97 %  BP: (110-144)/() 125/95     Weight: 33 kg (72 lb 12 oz)  Body mass index is 15.74 kg/m².    Intake/Output Summary (Last 24 hours) at 10/31/2020 1337  Last data filed at 10/31/2020 1300  Gross per 24 hour   Intake 2155.84 ml   Output 715 ml   Net 1440.84 ml      Physical Exam  Constitutional:       General: He is not in acute distress.     Appearance: He is well-developed. He is not diaphoretic.      Comments: Awake , nonverbal.    HENT:      Head: Normocephalic and atraumatic.      Mouth/Throat:      Pharynx: No oropharyngeal exudate.   Eyes:      Conjunctiva/sclera: Conjunctivae normal.      Pupils: Pupils are equal, round, and reactive to light.   Neck:      Musculoskeletal: Neck supple.      Thyroid: No thyromegaly.      Vascular: No JVD.   Cardiovascular:      Rate and Rhythm: Regular rhythm. Tachycardia present.      Heart sounds: Normal heart sounds. No murmur.   Pulmonary:      Effort: Pulmonary effort is normal. No respiratory distress.      Breath sounds: Normal breath sounds. No wheezing or rales.      Comments: Tachypnea , bronchial breath sounds   Chest:      Chest wall: No tenderness.   Abdominal:      General: Bowel sounds are normal. There is distension.      Palpations: Abdomen is soft.      Tenderness: There is no abdominal tenderness. There is no guarding or rebound.      Comments: Colostomy LLQ   Lymphadenopathy:      Cervical: No cervical adenopathy.   Skin:     General: Skin is warm and dry.      Findings: No rash.       Comments: Wound vac  in place    Neurological:      Comments: Awake , nonverbal         Significant Labs:   BMP:   Recent Labs   Lab 10/31/20  0425   *      K 2.6*      CO2 19*   BUN 18   CREATININE 0.7   CALCIUM 9.4   MG 2.0     CBC:   Recent Labs   Lab 10/30/20  0513   WBC 12.18   HGB 9.6*   HCT 32.3*   *       Significant Imaging:

## 2020-11-01 LAB
ANION GAP SERPL CALC-SCNC: 10 MMOL/L (ref 8–16)
ANION GAP SERPL CALC-SCNC: 11 MMOL/L (ref 8–16)
ANION GAP SERPL CALC-SCNC: 8 MMOL/L (ref 8–16)
BASOPHILS # BLD AUTO: 0.04 K/UL (ref 0–0.2)
BASOPHILS NFR BLD: 0.2 % (ref 0–1.9)
BUN SERPL-MCNC: 22 MG/DL (ref 6–20)
BUN SERPL-MCNC: 22 MG/DL (ref 6–20)
BUN SERPL-MCNC: 23 MG/DL (ref 6–20)
CALCIUM SERPL-MCNC: 8.3 MG/DL (ref 8.7–10.5)
CALCIUM SERPL-MCNC: 8.5 MG/DL (ref 8.7–10.5)
CALCIUM SERPL-MCNC: 9.8 MG/DL (ref 8.7–10.5)
CHLORIDE SERPL-SCNC: 112 MMOL/L (ref 95–110)
CHLORIDE SERPL-SCNC: 112 MMOL/L (ref 95–110)
CHLORIDE SERPL-SCNC: 113 MMOL/L (ref 95–110)
CO2 SERPL-SCNC: 14 MMOL/L (ref 23–29)
CO2 SERPL-SCNC: 15 MMOL/L (ref 23–29)
CO2 SERPL-SCNC: 16 MMOL/L (ref 23–29)
CREAT SERPL-MCNC: 0.6 MG/DL (ref 0.5–1.4)
CREAT SERPL-MCNC: 0.7 MG/DL (ref 0.5–1.4)
CREAT SERPL-MCNC: 0.7 MG/DL (ref 0.5–1.4)
DIFFERENTIAL METHOD: ABNORMAL
EOSINOPHIL # BLD AUTO: 0 K/UL (ref 0–0.5)
EOSINOPHIL NFR BLD: 0 % (ref 0–8)
ERYTHROCYTE [DISTWIDTH] IN BLOOD BY AUTOMATED COUNT: 13.6 % (ref 11.5–14.5)
EST. GFR  (AFRICAN AMERICAN): >60 ML/MIN/1.73 M^2
EST. GFR  (NON AFRICAN AMERICAN): >60 ML/MIN/1.73 M^2
GLUCOSE SERPL-MCNC: 111 MG/DL (ref 70–110)
GLUCOSE SERPL-MCNC: 184 MG/DL (ref 70–110)
GLUCOSE SERPL-MCNC: 213 MG/DL (ref 70–110)
HCT VFR BLD AUTO: 31.9 % (ref 40–54)
HGB BLD-MCNC: 9.7 G/DL (ref 14–18)
IMM GRANULOCYTES # BLD AUTO: 0.77 K/UL (ref 0–0.04)
IMM GRANULOCYTES NFR BLD AUTO: 4 % (ref 0–0.5)
LACTATE SERPL-SCNC: 0.8 MMOL/L (ref 0.5–2.2)
LYMPHOCYTES # BLD AUTO: 0.9 K/UL (ref 1–4.8)
LYMPHOCYTES NFR BLD: 4.5 % (ref 18–48)
MAGNESIUM SERPL-MCNC: 2.3 MG/DL (ref 1.6–2.6)
MCH RBC QN AUTO: 28.1 PG (ref 27–31)
MCHC RBC AUTO-ENTMCNC: 30.4 G/DL (ref 32–36)
MCV RBC AUTO: 93 FL (ref 82–98)
MONOCYTES # BLD AUTO: 1.2 K/UL (ref 0.3–1)
MONOCYTES NFR BLD: 6.4 % (ref 4–15)
NEUTROPHILS # BLD AUTO: 16.4 K/UL (ref 1.8–7.7)
NEUTROPHILS NFR BLD: 84.9 % (ref 38–73)
NRBC BLD-RTO: 0 /100 WBC
PHOSPHATE SERPL-MCNC: 2 MG/DL (ref 2.7–4.5)
PLATELET # BLD AUTO: 663 K/UL (ref 150–350)
PMV BLD AUTO: 9.9 FL (ref 9.2–12.9)
POTASSIUM SERPL-SCNC: 2.5 MMOL/L (ref 3.5–5.1)
POTASSIUM SERPL-SCNC: 2.6 MMOL/L (ref 3.5–5.1)
POTASSIUM SERPL-SCNC: 3.2 MMOL/L (ref 3.5–5.1)
RBC # BLD AUTO: 3.45 M/UL (ref 4.6–6.2)
SODIUM SERPL-SCNC: 137 MMOL/L (ref 136–145)
VANCOMYCIN SERPL-MCNC: 23.3 UG/ML
VANCOMYCIN TROUGH SERPL-MCNC: 28 UG/ML (ref 10–22)
WBC # BLD AUTO: 19.33 K/UL (ref 3.9–12.7)

## 2020-11-01 PROCEDURE — 84100 ASSAY OF PHOSPHORUS: CPT

## 2020-11-01 PROCEDURE — S0028 INJECTION, FAMOTIDINE, 20 MG: HCPCS | Performed by: INTERNAL MEDICINE

## 2020-11-01 PROCEDURE — 25000242 PHARM REV CODE 250 ALT 637 W/ HCPCS: Performed by: COLON & RECTAL SURGERY

## 2020-11-01 PROCEDURE — 80048 BASIC METABOLIC PNL TOTAL CA: CPT | Mod: 91

## 2020-11-01 PROCEDURE — 85025 COMPLETE CBC W/AUTO DIFF WBC: CPT

## 2020-11-01 PROCEDURE — 99900035 HC TECH TIME PER 15 MIN (STAT)

## 2020-11-01 PROCEDURE — 80202 ASSAY OF VANCOMYCIN: CPT

## 2020-11-01 PROCEDURE — 63600175 PHARM REV CODE 636 W HCPCS: Performed by: COLON & RECTAL SURGERY

## 2020-11-01 PROCEDURE — 25000003 PHARM REV CODE 250: Performed by: NURSE PRACTITIONER

## 2020-11-01 PROCEDURE — 63600175 PHARM REV CODE 636 W HCPCS: Performed by: INTERNAL MEDICINE

## 2020-11-01 PROCEDURE — 25000242 PHARM REV CODE 250 ALT 637 W/ HCPCS: Performed by: NURSE PRACTITIONER

## 2020-11-01 PROCEDURE — 63600175 PHARM REV CODE 636 W HCPCS: Performed by: NURSE PRACTITIONER

## 2020-11-01 PROCEDURE — 96372 THER/PROPH/DIAG INJ SC/IM: CPT

## 2020-11-01 PROCEDURE — 51702 INSERT TEMP BLADDER CATH: CPT

## 2020-11-01 PROCEDURE — 21400001 HC TELEMETRY ROOM

## 2020-11-01 PROCEDURE — 83735 ASSAY OF MAGNESIUM: CPT

## 2020-11-01 PROCEDURE — 31720 CLEARANCE OF AIRWAYS: CPT

## 2020-11-01 PROCEDURE — 83605 ASSAY OF LACTIC ACID: CPT

## 2020-11-01 PROCEDURE — 80202 ASSAY OF VANCOMYCIN: CPT | Mod: 91

## 2020-11-01 PROCEDURE — 25000003 PHARM REV CODE 250: Performed by: INTERNAL MEDICINE

## 2020-11-01 PROCEDURE — 25000003 PHARM REV CODE 250: Performed by: COLON & RECTAL SURGERY

## 2020-11-01 PROCEDURE — 94640 AIRWAY INHALATION TREATMENT: CPT

## 2020-11-01 PROCEDURE — 27000221 HC OXYGEN, UP TO 24 HOURS

## 2020-11-01 RX ORDER — POTASSIUM CHLORIDE 7.45 MG/ML
10 INJECTION INTRAVENOUS
Status: COMPLETED | OUTPATIENT
Start: 2020-11-02 | End: 2020-11-02

## 2020-11-01 RX ORDER — MORPHINE SULFATE 2 MG/ML
2 INJECTION, SOLUTION INTRAMUSCULAR; INTRAVENOUS EVERY 4 HOURS PRN
Status: DISCONTINUED | OUTPATIENT
Start: 2020-11-01 | End: 2020-11-02

## 2020-11-01 RX ORDER — METOPROLOL TARTRATE 1 MG/ML
5 INJECTION, SOLUTION INTRAVENOUS ONCE
Status: COMPLETED | OUTPATIENT
Start: 2020-11-01 | End: 2020-11-01

## 2020-11-01 RX ORDER — POTASSIUM CHLORIDE 7.45 MG/ML
10 INJECTION INTRAVENOUS
Status: COMPLETED | OUTPATIENT
Start: 2020-11-01 | End: 2020-11-01

## 2020-11-01 RX ORDER — METOPROLOL TARTRATE 1 MG/ML
INJECTION, SOLUTION INTRAVENOUS
Status: DISPENSED
Start: 2020-11-01 | End: 2020-11-01

## 2020-11-01 RX ADMIN — POTASSIUM PHOSPHATE, MONOBASIC AND POTASSIUM PHOSPHATE, DIBASIC 30 MMOL: 224; 236 INJECTION, SOLUTION, CONCENTRATE INTRAVENOUS at 10:11

## 2020-11-01 RX ADMIN — ENOXAPARIN SODIUM 30 MG: 100 INJECTION SUBCUTANEOUS at 04:11

## 2020-11-01 RX ADMIN — METRONIDAZOLE 500 MG: 500 TABLET ORAL at 06:11

## 2020-11-01 RX ADMIN — PHENOBARBITAL 100 MG: 20 ELIXIR ORAL at 10:11

## 2020-11-01 RX ADMIN — CHOLESTYRAMINE 4 G: 4 POWDER, FOR SUSPENSION ORAL at 08:11

## 2020-11-01 RX ADMIN — METRONIDAZOLE 500 MG: 500 TABLET ORAL at 01:11

## 2020-11-01 RX ADMIN — MORPHINE SULFATE 2 MG: 2 INJECTION, SOLUTION INTRAMUSCULAR; INTRAVENOUS at 09:11

## 2020-11-01 RX ADMIN — GABAPENTIN 250 MG: 250 SUSPENSION ORAL at 11:11

## 2020-11-01 RX ADMIN — SODIUM BICARBONATE: 84 INJECTION, SOLUTION INTRAVENOUS at 10:11

## 2020-11-01 RX ADMIN — FAMOTIDINE 20 MG: 20 INJECTION, SOLUTION INTRAVENOUS at 11:11

## 2020-11-01 RX ADMIN — VANCOMYCIN HYDROCHLORIDE 500 MG: 500 INJECTION, POWDER, LYOPHILIZED, FOR SOLUTION INTRAVENOUS at 04:11

## 2020-11-01 RX ADMIN — POTASSIUM CHLORIDE 10 MEQ: 7.46 INJECTION, SOLUTION INTRAVENOUS at 09:11

## 2020-11-01 RX ADMIN — ACETAMINOPHEN 650 MG: 325 TABLET ORAL at 08:11

## 2020-11-01 RX ADMIN — MORPHINE SULFATE 2 MG: 2 INJECTION, SOLUTION INTRAMUSCULAR; INTRAVENOUS at 04:11

## 2020-11-01 RX ADMIN — POTASSIUM CHLORIDE 10 MEQ: 7.46 INJECTION, SOLUTION INTRAVENOUS at 07:11

## 2020-11-01 RX ADMIN — CHLORHEXIDINE GLUCONATE 0.12% ORAL RINSE 15 ML: 1.2 LIQUID ORAL at 08:11

## 2020-11-01 RX ADMIN — SODIUM CHLORIDE 30 MG/ML INHALATION SOLUTION 4 ML: 30 SOLUTION INHALANT at 07:11

## 2020-11-01 RX ADMIN — SODIUM CHLORIDE 30 MG/ML INHALATION SOLUTION 4 ML: 30 SOLUTION INHALANT at 12:11

## 2020-11-01 RX ADMIN — CEFEPIME HYDROCHLORIDE 2 G: 2 INJECTION, SOLUTION INTRAVENOUS at 04:11

## 2020-11-01 RX ADMIN — PROPRANOLOL HYDROCHLORIDE 30 MG: 20 SOLUTION ORAL at 11:11

## 2020-11-01 RX ADMIN — METRONIDAZOLE 500 MG: 500 TABLET ORAL at 11:11

## 2020-11-01 RX ADMIN — MICONAZOLE NITRATE: 2 OINTMENT TOPICAL at 08:11

## 2020-11-01 RX ADMIN — PROPRANOLOL HYDROCHLORIDE 30 MG: 20 SOLUTION ORAL at 02:11

## 2020-11-01 RX ADMIN — POTASSIUM CHLORIDE 10 MEQ: 7.46 INJECTION, SOLUTION INTRAVENOUS at 08:11

## 2020-11-01 RX ADMIN — PROPRANOLOL HYDROCHLORIDE 30 MG: 20 SOLUTION ORAL at 08:11

## 2020-11-01 RX ADMIN — POTASSIUM CHLORIDE 10 MEQ: 7.46 INJECTION, SOLUTION INTRAVENOUS at 06:11

## 2020-11-01 RX ADMIN — SODIUM CHLORIDE 30 MG/ML INHALATION SOLUTION 4 ML: 30 SOLUTION INHALANT at 04:11

## 2020-11-01 RX ADMIN — METOROPROLOL TARTRATE 5 MG: 5 INJECTION, SOLUTION INTRAVENOUS at 04:11

## 2020-11-01 RX ADMIN — BACLOFEN 10 MG: 10 TABLET ORAL at 08:11

## 2020-11-01 RX ADMIN — BACLOFEN 10 MG: 10 TABLET ORAL at 11:11

## 2020-11-01 RX ADMIN — CEFEPIME HYDROCHLORIDE 2 G: 2 INJECTION, SOLUTION INTRAVENOUS at 12:11

## 2020-11-01 RX ADMIN — BACLOFEN 10 MG: 10 TABLET ORAL at 02:11

## 2020-11-01 RX ADMIN — FAMOTIDINE 20 MG: 20 INJECTION, SOLUTION INTRAVENOUS at 08:11

## 2020-11-01 RX ADMIN — CEFEPIME HYDROCHLORIDE 2 G: 2 INJECTION, SOLUTION INTRAVENOUS at 07:11

## 2020-11-01 NOTE — NURSING
At bedside with Dr Barry, Rizvi Cath straight tip inserted into stoma, pt jacqui well, rizvi cath tip noted to be protruding outside of stoma, inside colostomy bag. Will cont to monitor

## 2020-11-01 NOTE — NURSING
AMARI Malagon notified of HR increasing and remaining in the 140s. Orders given for IV metoprolol. Medication administered while monitor watched. HR now decreased to 118. Will continue to monitor.

## 2020-11-01 NOTE — ASSESSMENT & PLAN NOTE
Wound Care  Antibiotics per ID  PICC in place  Possible osteomyelitis   Wound vac placed   General Surgery consult to evaluate for diverting colostomy to prevent fecal contamination of wound.   10/29-   Patient to go to OR for diverting colostomy today  10/30 -  s/p Laparoscopic sigmoid loop colostomy construction performed yesterday  10/31-  Antibiotic continues per ID recs . Disposition - [facility]

## 2020-11-01 NOTE — PROGRESS NOTES
Pharmacokinetic Assessment Follow Up: IV Vancomycin    Vancomycin serum concentration assessment(s):    The trough level was drawn correctly and can be used to guide therapy at this time. The measurement is above the desired definitive target range of 15 to 20 mcg/mL.    Vancomycin Regimen Plan:    The scheduled dose of Vancomycin 500 mg was started at 0414 prior to the trough results and the nurse stopped the infusion at approximately 0445. Approximately 250 mg of Vancomycin may have been infused.   Discontinue the scheduled vancomycin regimen and re-dose when the random level is less than 20 mcg/mL, next level to be drawn at 1430 on 11/1/20.    Drug levels (last 3 results):  Recent Labs   Lab Result Units 10/29/20  1134 10/30/20  1400 11/01/20  0237   Vancomycin, Random ug/mL 14.1  --   --    Vancomycin-Trough ug/mL  --  15.1 28.0*       Pharmacy will continue to follow and monitor vancomycin.    Please contact pharmacy at extension [phone] for questions regarding this assessment.    Thank you for the consult,   Martin Burt       Patient brief summary:  Glen Moscoso is a 25 y.o. male initiated on antimicrobial therapy with IV Vancomycin for treatment of lower respiratory infection, decubitus ulcer    The patient's current regimen is pulse dosed until his serum level is in a therapeutic range, then we may adjust back to a regular regimen     Drug Allergies:   Review of patient's allergies indicates:  No Known Allergies    Actual Body Weight:   33 kg    Renal Function:   Estimated Creatinine Clearance: 76.6 mL/min (based on SCr of 0.7 mg/dL).,     Dialysis Method (if applicable):  N/A    CBC (last 72 hours):  Recent Labs   Lab Result Units 10/30/20  0513 11/01/20  0443   WBC K/uL 12.18 19.33*   Hemoglobin g/dL 9.6* 9.7*   Hematocrit % 32.3* 31.9*   Platelets K/uL 511* 663*   Gran % % 77.0* 84.9*   Lymph % % 19.0 4.5*   Mono % % 4.0 6.4   Eosinophil % % 0.0 0.0   Basophil % % 0.0 0.2   Differential Method  Manual  Automated       Metabolic Panel (last 72 hours):  Recent Labs   Lab Result Units 10/30/20  0513 10/31/20  0425 10/31/20  1530   Sodium mmol/L 136 136 135*   Potassium mmol/L 4.1 2.6* 3.2*   Chloride mmol/L 103 104 109   CO2 mmol/L 22* 19* 17*   Glucose mg/dL 113* 190* 162*   BUN mg/dL 14 18 18   Creatinine mg/dL 0.7 0.7 0.7   Albumin g/dL 2.3*  --   --    Total Bilirubin mg/dL 0.3  --   --    Alkaline Phosphatase U/L 153*  --   --    AST U/L 18  --   --    ALT U/L 22  --   --    Magnesium mg/dL 2.0 2.0  --    Phosphorus mg/dL 3.3 3.1  --        Vancomycin Administrations:  vancomycin given in the last 96 hours                     vancomycin 500 mg in dextrose 5 % 100 mL IVPB (ready to mix system) (mg) 500 mg New Bag 11/01/20 0414     500 mg New Bag 10/31/20 1638     500 mg New Bag  0332     500 mg New Bag 10/30/20 1601     500 mg New Bag  0318     500 mg New Bag 10/29/20 1505    vancomycin 500 mg in dextrose 5 % 100 mL IVPB (ready to mix system) (mg) 500 mg New Bag 10/28/20 2256    vancomycin 500 mg in dextrose 5 % 100 mL IVPB (ready to mix system) (mg) 500 mg New Bag 10/28/20 0806                    Microbiologic Results:  Microbiology Results (last 7 days)       Procedure Component Value Units Date/Time    Blood culture x two cultures. Draw prior to antibiotics. [[id number]] Collected: 10/22/20 1545    Order Status: Completed Specimen: Blood from Peripheral, Upper Arm, Right Updated: 10/27/20 2312     Blood Culture, Routine No growth after 5 days.    Narrative:      Aerobic and anaerobic    Blood culture x two cultures. Draw prior to antibiotics. [[id number]] Collected: 10/22/20 1530    Order Status: Completed Specimen: Blood from Peripheral, Upper Arm, Right Updated: 10/27/20 2312     Blood Culture, Routine No growth after 5 days.    Narrative:      Aerobic and anaerobic

## 2020-11-01 NOTE — PROGRESS NOTES
The patient was seen and examined this morning he had been transferred out of ICU to telemetry unit.  His abdomen is distended has previously examined yesterday.  His ostomy output is none.  The stoma continues to be well perfused with good vascularity.  The KUB that was performed this morning shows severely dilated entire colon extending from the cecum to the left lower quadrant.  It is suspected that the stoma may been kinked or severe edema obstructing the stoma.    A Iglesias catheter was inserted through the stoma and secured inside the colostomy bag, and decompressed distended colon.  The nursing staff was asked to document the location of the catheter per shift.  If the catheter is dislodged, it may be placed back in the colon.  If the catheter is completely removed, it also needs to be documented in the records.    May required drainage into the proximal colon with a Iglesias catheter.    Alireza Barry

## 2020-11-01 NOTE — ASSESSMENT & PLAN NOTE
- Laparoscopic colostomy creation -10/29/20   11/1  Post op Day # 3  Severe gaseous distention of colon noted. Iglesias catheter placed through the stoma for decompression. No stool output noted.

## 2020-11-01 NOTE — SUBJECTIVE & OBJECTIVE
Interval History:   Severe gaseous distention of colon noted. Iglesias catheter placed through the stoma for decompression. No stool output noted.       Review of Systems   Unable to perform ROS: Patient nonverbal     Objective:     Vital Signs (Most Recent):  Temp: 99.6 °F (37.6 °C) (11/01/20 1207)  Pulse: (!) 112 (11/01/20 1207)  Resp: 18 (11/01/20 1207)  BP: (!) 154/112 (11/01/20 1207)  SpO2: 97 % (11/01/20 1207) Vital Signs (24h Range):  Temp:  [97.1 °F (36.2 °C)-99.6 °F (37.6 °C)] 99.6 °F (37.6 °C)  Pulse:  [] 112  Resp:  [16-44] 18  SpO2:  [93 %-99 %] 97 %  BP: (117-167)/() 154/112     Weight: 33 kg (72 lb 12 oz)  Body mass index is 15.74 kg/m².    Intake/Output Summary (Last 24 hours) at 11/1/2020 1404  Last data filed at 11/1/2020 1200  Gross per 24 hour   Intake 780 ml   Output 380 ml   Net 400 ml      Physical Exam  Constitutional:       General: He is not in acute distress.     Appearance: He is well-developed. He is not diaphoretic.      Comments: Awake ,Nonverbal.    HENT:      Head: Normocephalic and atraumatic.      Mouth/Throat:      Pharynx: No oropharyngeal exudate.   Eyes:      Conjunctiva/sclera: Conjunctivae normal.      Pupils: Pupils are equal, round, and reactive to light.   Neck:      Musculoskeletal: Neck supple.      Thyroid: No thyromegaly.      Vascular: No JVD.   Cardiovascular:      Rate and Rhythm: Regular rhythm. Tachycardia present.      Heart sounds: Normal heart sounds. No murmur.   Pulmonary:      Comments: No distress, mild tachypnea , bronchial breath sounds kathia.   Abdominal:      General: There is distension.      Tenderness: There is no abdominal tenderness. There is no guarding or rebound.      Comments: Firm, distended , Bowel sounds - presents    Musculoskeletal: Normal range of motion.   Lymphadenopathy:      Cervical: No cervical adenopathy.   Skin:     General: Skin is warm and dry.      Findings: No rash.   Neurological:      Comments: Awake and Nonverbal           Significant Labs:   BMP:   Recent Labs   Lab 11/01/20  0443   *      K 2.5*   *   CO2 14*   BUN 22*   CREATININE 0.7   CALCIUM 9.8   MG 2.3     CBC:   Recent Labs   Lab 11/01/20  0443   WBC 19.33*   HGB 9.7*   HCT 31.9*   *     CMP:   Recent Labs   Lab 10/31/20  0425 10/31/20  1530 11/01/20  0443    135* 137   K 2.6* 3.2* 2.5*    109 112*   CO2 19* 17* 14*   * 162* 184*   BUN 18 18 22*   CREATININE 0.7 0.7 0.7   CALCIUM 9.4 9.4 9.8   ANIONGAP 13 9 11   EGFRNONAA >60 >60 >60       Significant Imaging:

## 2020-11-01 NOTE — PLAN OF CARE
Unable to assess orientation, pt non-verbal with trach. Vesna OVALLES NP notified of tachycardia. Pt remained free of falls this shift. No complaints of pain or discomfort. Medications administered as ordered. Pt is sinus tach on monitor. PICC intact. Hourly rounding completed. POC reviewed with mother over phone.  Will continue to monitor.       Problem: Fall Injury Risk  Goal: Absence of Fall and Fall-Related Injury  Outcome: Ongoing, Progressing  Intervention: Identify and Manage Contributors to Fall Injury Risk  Flowsheets (Taken 11/1/2020 0805)  Self-Care Promotion: safe use of adaptive equipment encouraged  Medication Review/Management: medications reviewed  Intervention: Promote Injury-Free Environment  Flowsheets (Taken 11/1/2020 0805)  Safety Promotion/Fall Prevention:   supervised activity   /camera at bedside   bed alarm set   side rails raised x 3  Environmental Safety Modification:   clutter free environment maintained   room organization consistent   room near unit station     Problem: Infection  Goal: Infection Symptom Resolution  Outcome: Ongoing, Progressing     Problem: Adult Inpatient Plan of Care  Goal: Plan of Care Review  Outcome: Ongoing, Progressing  Goal: Patient-Specific Goal (Individualization)  Outcome: Ongoing, Progressing  Goal: Absence of Hospital-Acquired Illness or Injury  Outcome: Ongoing, Progressing  Intervention: Identify and Manage Fall Risk  Flowsheets (Taken 11/1/2020 0805)  Safety Promotion/Fall Prevention:   supervised activity   /camera at bedside   bed alarm set   side rails raised x 3  Goal: Optimal Comfort and Wellbeing  Outcome: Ongoing, Progressing  Intervention: Provide Person-Centered Care  Flowsheets (Taken 11/1/2020 0805)  Trust Relationship/Rapport:   care explained   reassurance provided  Goal: Readiness for Transition of Care  Outcome: Ongoing, Progressing  Goal: Rounds/Family Conference  Outcome: Ongoing, Progressing      Problem: Wound  Goal: Optimal Wound Healing  Outcome: Ongoing, Progressing  Intervention: Promote Effective Wound Healing  Flowsheets (Taken 11/1/2020 0805)  Sleep/Rest Enhancement: noise level reduced  Pain Management Interventions:   relaxation techniques promoted   quiet environment facilitated     Problem: Communication Impairment (Mechanical Ventilation, Invasive)  Goal: Effective Communication  Outcome: Ongoing, Progressing  Intervention: Ensure Effective Communication  Flowsheets (Taken 11/1/2020 0805)  Communication Enhancement Strategies: call light answered in person     Problem: Device-Related Complication Risk (Mechanical Ventilation, Invasive)  Goal: Optimal Device Function  Outcome: Ongoing, Progressing     Problem: Inability to Wean (Mechanical Ventilation, Invasive)  Goal: Mechanical Ventilation Liberation  Outcome: Ongoing, Progressing  Intervention: Promote Extubation and Mechanical Ventilation Liberation  Flowsheets (Taken 11/1/2020 0805)  Sleep/Rest Enhancement: noise level reduced  Medication Review/Management: medications reviewed     Problem: Nutrition Impairment (Mechanical Ventilation, Invasive)  Goal: Optimal Nutrition Delivery  Outcome: Ongoing, Progressing     Problem: Skin and Tissue Injury (Mechanical Ventilation, Invasive)  Goal: Absence of Device-Related Skin and Tissue Injury  Outcome: Ongoing, Progressing     Problem: Ventilator-Induced Lung Injury (Mechanical Ventilation, Invasive)  Goal: Absence of Ventilator-Induced Lung Injury  Outcome: Ongoing, Progressing     Problem: Communication Impairment (Artificial Airway)  Goal: Effective Communication  Outcome: Ongoing, Progressing  Intervention: Ensure Effective Communication  Flowsheets (Taken 11/1/2020 0805)  Communication Enhancement Strategies: call light answered in person     Problem: Device-Related Complication Risk (Artificial Airway)  Goal: Optimal Device Function  Outcome: Ongoing, Progressing     Problem: Skin and Tissue  Injury (Artificial Airway)  Goal: Absence of Device-Related Skin or Tissue Injury  Outcome: Ongoing, Progressing     Problem: Skin Injury Risk Increased  Goal: Skin Health and Integrity  Outcome: Ongoing, Progressing     Problem: Fluid Imbalance (Pneumonia)  Goal: Fluid Balance  Outcome: Ongoing, Progressing     Problem: Infection (Pneumonia)  Goal: Resolution of Infection Signs/Symptoms  Outcome: Ongoing, Progressing     Problem: Respiratory Compromise (Pneumonia)  Goal: Effective Oxygenation and Ventilation  Outcome: Ongoing, Progressing     Problem: Restraint, Nonbehavioral (Nonviolent)  Goal: Discontinuation Criteria Achieved  Outcome: Ongoing, Progressing  Intervention: Implement Least-restrictive Safety Strategies  Flowsheets (Taken 11/1/2020 0805)  Less Restrictive Alternatives:   calming techniques promoted   bed alarm in use   sensory stimulation limited  Goal: Personal Dignity and Safety Maintained  Outcome: Ongoing, Progressing  Intervention: Protect Dignity, Rights, and Personal Wellbeing  Flowsheets (Taken 11/1/2020 0805)  Trust Relationship/Rapport:   care explained   reassurance provided  Intervention: Protect Skin and Joint Integrity  Flowsheets (Taken 11/1/2020 0805)  Body Position: turned

## 2020-11-01 NOTE — PROGRESS NOTES
[facility] Hospital Medicine  Progress Note    Patient Name: Glen Moscoso  MRN: [id number]  Patient Class: IP- Inpatient   Admission Date: 10/22/2020  Length of Stay: 10 days  Attending Physician: Travis Bobo MD  Primary Care Provider: Yadi Lawson MD        Subjective:     Principal Problem:Decubitus ulcer of ischial area, left, stage IV        HPI:  Glen Moscoso is a 25 y.o. male patient with a PMHx of cerebral palsy who presents to the Emergency Department for evaluation of respiratory distress which onset PTA. EMS reports pt was found face down on the pillow in nursing home. Pt arrives tachycardic and tachypneic. Patient recently discharge from [facility] where he was treated for respiratory failure and sepsis. Patient was discharged on 10/10/20 to [facility] on IV Vancomycin, Cefepime and Flagyl to treat pelvic osteomyelitis and resolving colitis. In the ED, WBCs 18.7K, Platelet 681, CO2 20, Lactate 2.3. UA negative. CXR revealed mild atelectasis or infiltrate right perihilar region and right midlung zone. Pt with worsening resp failure Subsequently pt was intubated. Sepsis protocol initiated in the ED including IV hydration. Hospital medicine called for admission. Patient placed in ICU. Patient is a full code, SDM mother, Leni Moscoso at [phone].     Overview/Hospital Course:  10/23  Remains intubated , enteral nutrition initiated. Continued treatment of sepsis due to aspiration pneumonia .due to poor air way protection .  Cultures are with negative growth to date. Temp Max 103 and wbc 19.30   Tachycardia was reported over night , follow up Echo EF 50 % normal diastolic function.  Case discussed with Dr Palma who has agreed for trach placement due to recurrent aspiration pneumonia.    10/24    Remains intubated, Tmax of 101.5 .CT Abd/pelvis - right inferior pubic ramus -cannot exclude     Osteomyelitis.      10/25   Self extubated, presently on Room air    Case discussed with  Dr Palma who has agreed for trach placement due to recurrent aspiration pneumonia.  10/26- Afebrile. Remains on RA with satisfactory SpO2. Trach placement planned for tomorrow . Wound vac placed to Rt./Lt ischium stage IV pressure ulcers with full thickness tissue loss with exposed bone. General Surgery consulted  to evaluate pt for diverting colostomy to prevent fecal contamination of wound . Rectal tube in place. Labs- WBC normalize 10.1, Hbg 9.2, Pl 512, creatinine 0.5   10/27- S/P tracheostomy placement today. Remains sedated on mechanical ventilation via  trach . Labs are unremarkable . General Surgery is recommending to continue wound vac and rectal tube for now , diverting colostomy is not suggested at this time.   10/28- Tmax 99. Transition to trach collar . SpO2 100% on O2 at 5 L/min . No distress noted . Per nursing staff liquid stool sips around Rectal tube . Wound vac contaminated with liquid stool . Spoke to Dr. Ortez and he will evaluate pt for diverting colostomy . ID consult obtained and recs are noted. Add Questran powder for loose stool. Labs are unremarkable.   10/29- Afebrile . Trach collar in place. Patient to go to OR for diverting colostomy today. Antibiotic continues.   10/30- s/p Laparoscopic sigmoid loop colostomy construction performed yesterday. Leakage noted around G-tube . Discussed with Dr. Ortez and recommend Fluro gastric tube study . Hold PEG feeding for now.   10/31- Afebrile . Appears tachypnic . Trach collar in place . CXR this morning resulted clear lungs . SpO2 is satisfactory on FiO2 21%.  Harsha button replaced with a new G tube yesterday by IR due to leakage. Per IR, the G tube may be exchanged for a harsha button at a future date . Abdomen appears distended , however good bowel sounds appreciated . LLQ colostomy stoma is beefy red with dark  liquid noted in the colostomy bag. Antibiotic continues for stage IV decubitus ulcer and presumed  Osteomyelitis. Disposition - [facility] once colostomy functions and output begins.  11/1- Abdomen is markedly distended . X-ray abd showed severe gaseous distention of the colon . Dr. Barry performed Iglesias catheter decompression with improvement of distension. No stool output noted . Per Surgery Ok to continue tube feed. Pt was noted to be tachycardic , tachypnic possibly due to discomfort from abdominal distention. Labs resulted persistent hypokalemia and metabolic acidosis with elevated BUN. Replete electrolytes and volume. Leukocytosis is noted which  likely reactive. Antibiotic continues for stage IV decubitus ulcer and presumed  Osteomyelitis. Disposition - [facility] once colostomy functions and output begins.  .        Interval History:   Severe gaseous distention of colon noted. Iglesias catheter placed through the stoma for decompression. No stool output noted.       Review of Systems   Unable to perform ROS: Patient nonverbal     Objective:     Vital Signs (Most Recent):  Temp: 99.6 °F (37.6 °C) (11/01/20 1207)  Pulse: (!) 112 (11/01/20 1207)  Resp: 18 (11/01/20 1207)  BP: (!) 154/112 (11/01/20 1207)  SpO2: 97 % (11/01/20 1207) Vital Signs (24h Range):  Temp:  [97.1 °F (36.2 °C)-99.6 °F (37.6 °C)] 99.6 °F (37.6 °C)  Pulse:  [] 112  Resp:  [16-44] 18  SpO2:  [93 %-99 %] 97 %  BP: (117-167)/() 154/112     Weight: 33 kg (72 lb 12 oz)  Body mass index is 15.74 kg/m².    Intake/Output Summary (Last 24 hours) at 11/1/2020 1404  Last data filed at 11/1/2020 1200  Gross per 24 hour   Intake 780 ml   Output 380 ml   Net 400 ml      Physical Exam  Constitutional:       General: He is not in acute distress.     Appearance: He is well-developed. He is not diaphoretic.      Comments: Awake ,Nonverbal.    HENT:      Head: Normocephalic and atraumatic.      Mouth/Throat:      Pharynx: No oropharyngeal exudate.   Eyes:      Conjunctiva/sclera: Conjunctivae normal.      Pupils: Pupils are equal, round, and reactive to light.   Neck:       Musculoskeletal: Neck supple.      Thyroid: No thyromegaly.      Vascular: No JVD.   Cardiovascular:      Rate and Rhythm: Regular rhythm. Tachycardia present.      Heart sounds: Normal heart sounds. No murmur.   Pulmonary:      Comments: No distress, mild tachypnea , bronchial breath sounds kathia.   Abdominal:      General: There is distension.      Tenderness: There is no abdominal tenderness. There is no guarding or rebound.      Comments: Firm, distended , Bowel sounds - presents    Musculoskeletal: Normal range of motion.   Lymphadenopathy:      Cervical: No cervical adenopathy.   Skin:     General: Skin is warm and dry.      Findings: No rash.   Neurological:      Comments: Awake and Nonverbal          Significant Labs:   BMP:   Recent Labs   Lab 11/01/20  0443   *      K 2.5*   *   CO2 14*   BUN 22*   CREATININE 0.7   CALCIUM 9.8   MG 2.3     CBC:   Recent Labs   Lab 11/01/20  0443   WBC 19.33*   HGB 9.7*   HCT 31.9*   *     CMP:   Recent Labs   Lab 10/31/20  0425 10/31/20  1530 11/01/20  0443    135* 137   K 2.6* 3.2* 2.5*    109 112*   CO2 19* 17* 14*   * 162* 184*   BUN 18 18 22*   CREATININE 0.7 0.7 0.7   CALCIUM 9.4 9.4 9.8   ANIONGAP 13 9 11   EGFRNONAA >60 >60 >60       Significant Imaging:       Assessment/Plan:      * Decubitus ulcer of ischial area, left, stage IV  Wound Care  Antibiotics per ID  PICC in place  Possible osteomyelitis   Wound vac placed   General Surgery consult to evaluate for diverting colostomy to prevent fecal contamination of wound.   10/29-   Patient to go to OR for diverting colostomy today  10/30 -  s/p Laparoscopic sigmoid loop colostomy construction performed yesterday  10/31-  Antibiotic continues per ID recs . Disposition - [facility]         Osteomyelitis of pelvis, presumed   ID recommended 6 weeks therapy  with Vancomycin, Cefepime and Flagyl  -EOC -12/09/2020      Status post colostomy  - Laparoscopic colostomy creation  -10/29/20   11/1  Post op Day # 3  Severe gaseous distention of colon noted. Iglesias catheter placed through the stoma for decompression. No stool output noted.       Leaking PEG tube  10/30-  Fluro Gastric tube study today.   10/31-  Harsha button replaced with a new G tube yesterday by IR due to leakage. Per IR, the G tube may be exchanged for a harsha button at a future date         Right lower lobe pneumonia  Recurrent Aspiration Pneumonia  S/P Trach placement 10/27/20  PEG tube for feeding and medication administration   Continue antibiotic for 5 to 7 days   ID consult obtained . 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  -Transfuse P-RBC for Hgb 7 or under       Seizure disorder  Phenobarbital  Neuro checks    Cerebral palsy  Supportive care       VTE Risk Mitigation (From admission, onward)         Ordered     enoxaparin injection 30 mg  Every 24 hours      10/27/20 1517     IP VTE HIGH RISK PATIENT  Once      10/22/20 1825     Place sequential compression device  Until discontinued      10/22/20 1754                Discharge Planning   ROCÍO:      Code Status: Full Code   Is the patient medically ready for discharge?:     Reason for patient still in hospital (select all that apply): Patient trending condition  Discharge Plan A: Long-term acute care facility (LTAC)   Discharge Delays: (!) Other(awaiting colostomy output)              Travis Bobo MD  Department of Hospital Medicine   [facility]

## 2020-11-01 NOTE — PLAN OF CARE
Patient AA unable to assess orientation, non verbal. VSS.  Patient remained afebrile throughout the shift.  Heart rate closely monitored   Patient ST on monitor.  Trach collar with humidification  Colostomy and Iglesias care  Cont Tube Feedings via PEG  IVF, ABX and Electrolyte replacement via PICC line  Patient remained free of falls this shift.  Plan of care reviewed.  Patient turned every 2 hours  Frequent weight shifting done with assistance and specialty bed  Bed low, siderails up x2, wheels locked, call light in reach.  Patient instructed to call for assistance.  Hourly rounding completed.  Will continue to monitor.

## 2020-11-01 NOTE — PROGRESS NOTES
Pharmacokinetic Assessment Follow Up: IV Vancomycin    Vancomycin serum concentration assessment(s):    The random level was drawn correctly and can be used to guide therapy at this time. The measurement is within the desired definitive target range of 15 to 20 mcg/mL.    Vancomycin Regimen Plan:    Re-dose when the random level is less than 20 mcg/mL, next level to be drawn at 0230 on 11/2    Drug levels (last 3 results):  Recent Labs   Lab Result Units 10/30/20  1400 11/01/20  0237 11/01/20  1341   Vancomycin, Random ug/mL  --   --  23.3   Vancomycin-Trough ug/mL 15.1 28.0*  --        Pharmacy will continue to follow and monitor vancomycin.    Please contact pharmacy at extension [phone] for questions regarding this assessment.    Thank you for the consult,   Pauline Page       Patient brief summary:  Glen Moscoso is a 25 y.o. male initiated on antimicrobial therapy with IV Vancomycin for treatment of skin & soft tissue infection/presumed osteo    The patient's current regimen is pulse dosing    Drug Allergies:   Review of patient's allergies indicates:  No Known Allergies    Actual Body Weight:   33kg    Renal Function:   Estimated Creatinine Clearance: 76.6 mL/min (based on SCr of 0.7 mg/dL).,     Dialysis Method (if applicable):  N/A    CBC (last 72 hours):  Recent Labs   Lab Result Units 10/30/20  0513 11/01/20  0443   WBC K/uL 12.18 19.33*   Hemoglobin g/dL 9.6* 9.7*   Hematocrit % 32.3* 31.9*   Platelets K/uL 511* 663*   Gran % % 77.0* 84.9*   Lymph % % 19.0 4.5*   Mono % % 4.0 6.4   Eosinophil % % 0.0 0.0   Basophil % % 0.0 0.2   Differential Method  Manual Automated       Metabolic Panel (last 72 hours):  Recent Labs   Lab Result Units 10/30/20  0513 10/31/20  0425 10/31/20  1530 11/01/20  0443 11/01/20  1341   Sodium mmol/L 136 136 135* 137 137   Potassium mmol/L 4.1 2.6* 3.2* 2.5* 3.2*   Chloride mmol/L 103 104 109 112* 112*   CO2 mmol/L 22* 19* 17* 14* 15*   Glucose mg/dL 113* 190* 162* 184* 213*    BUN mg/dL 14 18 18 22* 23*   Creatinine mg/dL 0.7 0.7 0.7 0.7 0.7   Albumin g/dL 2.3*  --   --   --   --    Total Bilirubin mg/dL 0.3  --   --   --   --    Alkaline Phosphatase U/L 153*  --   --   --   --    AST U/L 18  --   --   --   --    ALT U/L 22  --   --   --   --    Magnesium mg/dL 2.0 2.0  --  2.3  --    Phosphorus mg/dL 3.3 3.1  --  2.0*  --        Vancomycin Administrations:  vancomycin given in the last 96 hours                   vancomycin 500 mg in dextrose 5 % 100 mL IVPB (ready to mix system) (mg) 500 mg New Bag 11/01/20 0414     500 mg New Bag 10/31/20 1638     500 mg New Bag  0332     500 mg New Bag 10/30/20 1601     500 mg New Bag  0318     500 mg New Bag 10/29/20 1505    vancomycin 500 mg in dextrose 5 % 100 mL IVPB (ready to mix system) (mg) 500 mg New Bag 10/28/20 2256                Microbiologic Results:  Microbiology Results (last 7 days)     Procedure Component Value Units Date/Time    Blood culture x two cultures. Draw prior to antibiotics. [[id number]] Collected: 10/22/20 1545    Order Status: Completed Specimen: Blood from Peripheral, Upper Arm, Right Updated: 10/27/20 2312     Blood Culture, Routine No growth after 5 days.    Narrative:      Aerobic and anaerobic    Blood culture x two cultures. Draw prior to antibiotics. [[id number]] Collected: 10/22/20 1530    Order Status: Completed Specimen: Blood from Peripheral, Upper Arm, Right Updated: 10/27/20 2312     Blood Culture, Routine No growth after 5 days.    Narrative:      Aerobic and anaerobic

## 2020-11-02 LAB
ALBUMIN SERPL BCP-MCNC: 1.8 G/DL (ref 3.5–5.2)
ALP SERPL-CCNC: 99 U/L (ref 55–135)
ALT SERPL W/O P-5'-P-CCNC: 10 U/L (ref 10–44)
ANION GAP SERPL CALC-SCNC: 7 MMOL/L (ref 8–16)
AST SERPL-CCNC: 16 U/L (ref 10–40)
BASOPHILS # BLD AUTO: 0.01 K/UL (ref 0–0.2)
BASOPHILS NFR BLD: 0.1 % (ref 0–1.9)
BILIRUB SERPL-MCNC: 0.1 MG/DL (ref 0.1–1)
BUN SERPL-MCNC: 21 MG/DL (ref 6–20)
CALCIUM SERPL-MCNC: 8.1 MG/DL (ref 8.7–10.5)
CHLORIDE SERPL-SCNC: 113 MMOL/L (ref 95–110)
CO2 SERPL-SCNC: 16 MMOL/L (ref 23–29)
CREAT SERPL-MCNC: 0.6 MG/DL (ref 0.5–1.4)
DIFFERENTIAL METHOD: ABNORMAL
EOSINOPHIL # BLD AUTO: 0 K/UL (ref 0–0.5)
EOSINOPHIL NFR BLD: 0 % (ref 0–8)
ERYTHROCYTE [DISTWIDTH] IN BLOOD BY AUTOMATED COUNT: 13.7 % (ref 11.5–14.5)
EST. GFR  (AFRICAN AMERICAN): >60 ML/MIN/1.73 M^2
EST. GFR  (NON AFRICAN AMERICAN): >60 ML/MIN/1.73 M^2
GLUCOSE SERPL-MCNC: 111 MG/DL (ref 70–110)
HCT VFR BLD AUTO: 26 % (ref 40–54)
HGB BLD-MCNC: 7.8 G/DL (ref 14–18)
IMM GRANULOCYTES # BLD AUTO: 0.5 K/UL (ref 0–0.04)
IMM GRANULOCYTES NFR BLD AUTO: 4.2 % (ref 0–0.5)
LYMPHOCYTES # BLD AUTO: 1.2 K/UL (ref 1–4.8)
LYMPHOCYTES NFR BLD: 10.4 % (ref 18–48)
MAGNESIUM SERPL-MCNC: 1.9 MG/DL (ref 1.6–2.6)
MCH RBC QN AUTO: 27.8 PG (ref 27–31)
MCHC RBC AUTO-ENTMCNC: 30 G/DL (ref 32–36)
MCV RBC AUTO: 93 FL (ref 82–98)
MONOCYTES # BLD AUTO: 1 K/UL (ref 0.3–1)
MONOCYTES NFR BLD: 8.3 % (ref 4–15)
NEUTROPHILS # BLD AUTO: 9.1 K/UL (ref 1.8–7.7)
NEUTROPHILS NFR BLD: 77 % (ref 38–73)
NRBC BLD-RTO: 0 /100 WBC
PHOSPHATE SERPL-MCNC: 1.4 MG/DL (ref 2.7–4.5)
PLATELET # BLD AUTO: 487 K/UL (ref 150–350)
PMV BLD AUTO: 9.9 FL (ref 9.2–12.9)
POTASSIUM SERPL-SCNC: 3.5 MMOL/L (ref 3.5–5.1)
PROT SERPL-MCNC: 6.1 G/DL (ref 6–8.4)
RBC # BLD AUTO: 2.81 M/UL (ref 4.6–6.2)
SODIUM SERPL-SCNC: 136 MMOL/L (ref 136–145)
VANCOMYCIN SERPL-MCNC: 12.1 UG/ML
WBC # BLD AUTO: 11.78 K/UL (ref 3.9–12.7)

## 2020-11-02 PROCEDURE — 97605 NEG PRS WND THER DME<=50SQCM: CPT

## 2020-11-02 PROCEDURE — 63600175 PHARM REV CODE 636 W HCPCS: Performed by: INTERNAL MEDICINE

## 2020-11-02 PROCEDURE — 31720 CLEARANCE OF AIRWAYS: CPT

## 2020-11-02 PROCEDURE — 99900035 HC TECH TIME PER 15 MIN (STAT)

## 2020-11-02 PROCEDURE — 94640 AIRWAY INHALATION TREATMENT: CPT

## 2020-11-02 PROCEDURE — 99024 PR POST-OP FOLLOW-UP VISIT: ICD-10-PCS | Mod: ,,, | Performed by: COLON & RECTAL SURGERY

## 2020-11-02 PROCEDURE — 25000242 PHARM REV CODE 250 ALT 637 W/ HCPCS: Performed by: NURSE PRACTITIONER

## 2020-11-02 PROCEDURE — 94761 N-INVAS EAR/PLS OXIMETRY MLT: CPT

## 2020-11-02 PROCEDURE — 83735 ASSAY OF MAGNESIUM: CPT

## 2020-11-02 PROCEDURE — 25000003 PHARM REV CODE 250: Performed by: NURSE PRACTITIONER

## 2020-11-02 PROCEDURE — 80053 COMPREHEN METABOLIC PANEL: CPT

## 2020-11-02 PROCEDURE — 87040 BLOOD CULTURE FOR BACTERIA: CPT | Mod: 59

## 2020-11-02 PROCEDURE — 85025 COMPLETE CBC W/AUTO DIFF WBC: CPT

## 2020-11-02 PROCEDURE — 99024 POSTOP FOLLOW-UP VISIT: CPT | Mod: ,,, | Performed by: COLON & RECTAL SURGERY

## 2020-11-02 PROCEDURE — 27000221 HC OXYGEN, UP TO 24 HOURS

## 2020-11-02 PROCEDURE — 96372 THER/PROPH/DIAG INJ SC/IM: CPT

## 2020-11-02 PROCEDURE — 80202 ASSAY OF VANCOMYCIN: CPT

## 2020-11-02 PROCEDURE — S0028 INJECTION, FAMOTIDINE, 20 MG: HCPCS | Performed by: INTERNAL MEDICINE

## 2020-11-02 PROCEDURE — 63600175 PHARM REV CODE 636 W HCPCS: Performed by: NURSE PRACTITIONER

## 2020-11-02 PROCEDURE — 84100 ASSAY OF PHOSPHORUS: CPT

## 2020-11-02 PROCEDURE — 27000325 HC DRESSING INFOVAC LARGE BLK

## 2020-11-02 PROCEDURE — 21400001 HC TELEMETRY ROOM

## 2020-11-02 PROCEDURE — 25000003 PHARM REV CODE 250: Performed by: INTERNAL MEDICINE

## 2020-11-02 RX ORDER — POTASSIUM CHLORIDE 1.5 G/1.58G
40 POWDER, FOR SOLUTION ORAL EVERY 4 HOURS
Status: COMPLETED | OUTPATIENT
Start: 2020-11-02 | End: 2020-11-02

## 2020-11-02 RX ORDER — FAMOTIDINE 10 MG/ML
20 INJECTION INTRAVENOUS ONCE
Status: COMPLETED | OUTPATIENT
Start: 2020-11-02 | End: 2020-11-02

## 2020-11-02 RX ORDER — METHYLPREDNISOLONE SOD SUCC 125 MG
125 VIAL (EA) INJECTION ONCE
Status: COMPLETED | OUTPATIENT
Start: 2020-11-02 | End: 2020-11-02

## 2020-11-02 RX ORDER — FAMOTIDINE 20 MG/50ML
20 INJECTION, SOLUTION INTRAVENOUS 2 TIMES DAILY PRN
Status: DISCONTINUED | OUTPATIENT
Start: 2020-11-02 | End: 2020-11-03 | Stop reason: HOSPADM

## 2020-11-02 RX ORDER — DIPHENHYDRAMINE HYDROCHLORIDE 50 MG/ML
25 INJECTION INTRAMUSCULAR; INTRAVENOUS EVERY 6 HOURS PRN
Status: DISCONTINUED | OUTPATIENT
Start: 2020-11-02 | End: 2020-11-03 | Stop reason: HOSPADM

## 2020-11-02 RX ADMIN — METRONIDAZOLE 500 MG: 500 TABLET ORAL at 03:11

## 2020-11-02 RX ADMIN — POTASSIUM CHLORIDE 10 MEQ: 7.46 INJECTION, SOLUTION INTRAVENOUS at 04:11

## 2020-11-02 RX ADMIN — POTASSIUM CHLORIDE 10 MEQ: 7.46 INJECTION, SOLUTION INTRAVENOUS at 06:11

## 2020-11-02 RX ADMIN — FAMOTIDINE 20 MG: 20 INJECTION, SOLUTION INTRAVENOUS at 10:11

## 2020-11-02 RX ADMIN — BACLOFEN 10 MG: 10 TABLET ORAL at 09:11

## 2020-11-02 RX ADMIN — METHYLPREDNISOLONE SODIUM SUCCINATE 125 MG: 125 INJECTION, POWDER, FOR SOLUTION INTRAMUSCULAR; INTRAVENOUS at 09:11

## 2020-11-02 RX ADMIN — DIPHENHYDRAMINE HYDROCHLORIDE 25 MG: 50 INJECTION, SOLUTION INTRAMUSCULAR; INTRAVENOUS at 09:11

## 2020-11-02 RX ADMIN — SODIUM CHLORIDE 30 MG/ML INHALATION SOLUTION 4 ML: 30 SOLUTION INHALANT at 04:11

## 2020-11-02 RX ADMIN — CEFEPIME HYDROCHLORIDE 2 G: 2 INJECTION, SOLUTION INTRAVENOUS at 03:11

## 2020-11-02 RX ADMIN — METRONIDAZOLE 500 MG: 500 TABLET ORAL at 06:11

## 2020-11-02 RX ADMIN — CHLORHEXIDINE GLUCONATE 0.12% ORAL RINSE 15 ML: 1.2 LIQUID ORAL at 09:11

## 2020-11-02 RX ADMIN — VANCOMYCIN HYDROCHLORIDE 750 MG: 750 INJECTION, POWDER, LYOPHILIZED, FOR SOLUTION INTRAVENOUS at 08:11

## 2020-11-02 RX ADMIN — MORPHINE SULFATE 2 MG: 2 INJECTION, SOLUTION INTRAMUSCULAR; INTRAVENOUS at 09:11

## 2020-11-02 RX ADMIN — SODIUM BICARBONATE: 84 INJECTION, SOLUTION INTRAVENOUS at 02:11

## 2020-11-02 RX ADMIN — ACETAMINOPHEN 650 MG: 325 TABLET ORAL at 09:11

## 2020-11-02 RX ADMIN — POTASSIUM CHLORIDE 40 MEQ: 1.5 POWDER, FOR SOLUTION ORAL at 04:11

## 2020-11-02 RX ADMIN — PROPRANOLOL HYDROCHLORIDE 30 MG: 20 SOLUTION ORAL at 09:11

## 2020-11-02 RX ADMIN — SODIUM CHLORIDE 30 MG/ML INHALATION SOLUTION 4 ML: 30 SOLUTION INHALANT at 08:11

## 2020-11-02 RX ADMIN — CEFEPIME HYDROCHLORIDE 2 G: 2 INJECTION, SOLUTION INTRAVENOUS at 01:11

## 2020-11-02 RX ADMIN — GABAPENTIN 250 MG: 250 SUSPENSION ORAL at 09:11

## 2020-11-02 RX ADMIN — ENOXAPARIN SODIUM 30 MG: 100 INJECTION SUBCUTANEOUS at 04:11

## 2020-11-02 RX ADMIN — FAMOTIDINE 20 MG: 10 INJECTION INTRAVENOUS at 09:11

## 2020-11-02 RX ADMIN — CEFEPIME HYDROCHLORIDE 2 G: 2 INJECTION, SOLUTION INTRAVENOUS at 09:11

## 2020-11-02 RX ADMIN — POTASSIUM CHLORIDE 10 MEQ: 7.46 INJECTION, SOLUTION INTRAVENOUS at 03:11

## 2020-11-02 RX ADMIN — POTASSIUM CHLORIDE 10 MEQ: 7.46 INJECTION, SOLUTION INTRAVENOUS at 01:11

## 2020-11-02 RX ADMIN — POTASSIUM CHLORIDE 40 MEQ: 1.5 POWDER, FOR SOLUTION ORAL at 01:11

## 2020-11-02 RX ADMIN — PHENOBARBITAL 100 MG: 20 ELIXIR ORAL at 09:11

## 2020-11-02 RX ADMIN — MICONAZOLE NITRATE: 2 OINTMENT TOPICAL at 09:11

## 2020-11-02 RX ADMIN — SODIUM CHLORIDE 30 MG/ML INHALATION SOLUTION 4 ML: 30 SOLUTION INHALANT at 12:11

## 2020-11-02 RX ADMIN — METRONIDAZOLE 500 MG: 500 TABLET ORAL at 09:11

## 2020-11-02 NOTE — SIGNIFICANT EVENT
Deterioration alert received, likely d/t documented temperature of 101.7 and RR of 36 - pt seen and examined at bedside - no evidence of acute distress at this time. Discussed with primary nurse - pt already on cefepime and flagyl and nurse is preparing to administer tylenol. No indication for higher level of care at this time.

## 2020-11-02 NOTE — ASSESSMENT & PLAN NOTE
Wound Care  Antibiotics per ID  PICC in place  Possible osteomyelitis   Wound vac placed   General Surgery consult to evaluate for diverting colostomy to prevent fecal contamination of wound.   10/29-   Patient to go to OR for diverting colostomy today  10/30 -  s/p Laparoscopic sigmoid loop colostomy construction performed yesterday  10/31-  Antibiotic continues per ID recs . Disposition - [facility]   11/2  ABX  Wound Care

## 2020-11-02 NOTE — PROGRESS NOTES
11/02/20 0920        Negative Pressure Wound Therapy  10/26/20    Placement Date: 10/26/20   Side: (c)   Location: Ischial tuberosity   NPWT Type Vacuum Therapy   Therapy Setting NPWT Continuous therapy   Pressure Setting NPWT 125 mmHg   Therapy Interventions NPWT Dressing changed;Y connector   Sponges Inserted NPWT Black   Sponges Removed NPWT Black        Altered Skin Integrity 10/23/20 Right Ischial tuberosity Full thickness tissue loss with exposed bone, tendon, or muscle. Often includes undermining and tunneling. May extend into muscle and/or supporting structures.   Date First Assessed: 10/23/20   Altered Skin Integrity Present on Admission: yes  Side: Right  Location: Ischial tuberosity  Description of Altered Skin Integrity: Full thickness tissue loss with exposed bone, tendon, or muscle. Often includes undermi...   Description of Altered Skin Integrity Full thickness tissue loss with exposed bone, tendon, or muscle. Often includes undermining and tunneling. May extend into muscle and/or supporting structures.   Dressing Appearance Intact   Drainage Amount Small   Drainage Characteristics/Odor Serosanguineous   Appearance Red;Muscle;Bone;Granulating   Tissue loss description Full thickness   Periwound Area Intact   Wound Edges Open   Wound Length (cm) 5 cm   Wound Width (cm) 5 cm   Wound Depth (cm) 2 cm   Wound Volume (cm^3) 50 cm^3   Wound Surface Area (cm^2) 25 cm^2   Undermining (depth (cm)/location) 3cm from 9-1 o'clock   Care Cleansed with:;Wound cleanser;Applied:;Skin Barrier   Dressing Changed   Packing Inserted  2   Packing Removed 1   Periwound Care Skin barrier film applied   Dressing Change Due 11/04/20        Altered Skin Integrity 10/23/20 Left Ischial tuberosity Full thickness tissue loss with exposed bone, tendon, or muscle. Often includes undermining and tunneling. May extend into muscle and/or supporting structures.   Date First Assessed: 10/23/20   Altered Skin Integrity Present on  "Admission: yes  Side: Left  Location: Ischial tuberosity  Description of Altered Skin Integrity: Full thickness tissue loss with exposed bone, tendon, or muscle. Often includes undermin...   Description of Altered Skin Integrity Full thickness tissue loss with exposed bone, tendon, or muscle. Often includes undermining and tunneling. May extend into muscle and/or supporting structures.   Dressing Appearance Intact   Drainage Amount Small   Drainage Characteristics/Odor Serosanguineous   Appearance Red;Purple;Muscle;Granulating   Tissue loss description Full thickness   Periwound Area Redness   Wound Edges Open   Wound Length (cm) 5 cm   Wound Width (cm) 4 cm   Wound Depth (cm) 2 cm   Wound Volume (cm^3) 40 cm^3   Wound Surface Area (cm^2) 20 cm^2   Undermining (depth (cm)/location) 3cm from 9-3 o'clock   Care Cleansed with:;Sterile normal saline;Applied:;Skin Barrier   Dressing Changed   Packing Inserted  2   Packing Removed 1   Periwound Care Skin barrier film applied   Dressing Change Due 11/04/20     F/U visit with Mr. Moscoso for continued wound vac management and new colostomy management. He is awake and alert.  He is now POD4 LLQ diverting loop colostomy. Stoma is moist, bright red, edematous and "mushroomed". One piece flat ostomy pouch intact with no leak. Iglesias catheter has been inserted into stoma outlet for decompression. Emptied 70mL thin liquid yellow/tan stool from pouch at this time. KEYSHAWN-KEY tube intact with gauze and tegaderm dressing in place, CDI.      Wound vac dressing then changed:  Right Ischium wound vac dressing removed. Wound measurements [id number] with 3cm undermining extending from 9-1 o'clock. Wound bed is moist, red budding granulation tissue, bone remains palpable. Cleansed with wound cleanser spray and patted dry. Nayely wound skin sprayed with cavilon, duoderm applied to line edges. Two pieces black foam cut to size and applied to fill wound, and sealed with hydrocolloid and drape, patient " then repositioned to right side semi-prone.  Resolving MASD and intergrigo noted to bilateral groins, bilateral hips, bilateral popliteal, gluteal cleft. Preventative sacral foam dressing maintained. MASD to scrotum also noted with redness and denuded skin. Painted with cavilon and duoderm applied for protection.  Left ischium wound vac dressing removed.  Wound [id number] with 3cm undermining extending from 9-3 o'clock. Wound bed is moist, red budding granulation tissue over muscle, bone remains palpable, dark red/purple area also noted to central portion of wound. Cleansed with wound cleanser spray and patted dry. Nayely wound skin sprayed with cavilon,edges lined with duoderm. Two pieces black foam cut to size and applied to fill wound, and sealed with hydrocolloid and drape, patient then repositioned tilted to right side with pillow, extremities supported. Both wound VAC dressings connected via Y-connector to [employer] wound vac ulta at same settings: continuous -125 mmHg low intensity suction with good seal noted, no air leak.  Next wound vac dressing change scheduled Wednesday 11/4/2020. Discharge plan remains [facility] once ostomy begins functioning.

## 2020-11-02 NOTE — SUBJECTIVE & OBJECTIVE
Interval History: Patient improved with good output from ostomy. Await [facility] placement.     Review of Systems   Unable to perform ROS: Patient nonverbal     Objective:     Vital Signs (Most Recent):  Temp: 99 °F (37.2 °C) (11/02/20 1510)  Pulse: 108 (11/02/20 1700)  Resp: (!) 21 (11/02/20 1628)  BP: 108/67 (11/02/20 1510)  SpO2: 98 % (11/02/20 1628) Vital Signs (24h Range):  Temp:  [97.5 °F (36.4 °C)-101.7 °F (38.7 °C)] 99 °F (37.2 °C)  Pulse:  [] 108  Resp:  [16-40] 21  SpO2:  [95 %-100 %] 98 %  BP: (108-128)/(65-88) 108/67     Weight: 38 kg (83 lb 12.4 oz)  Body mass index is 18.13 kg/m².    Intake/Output Summary (Last 24 hours) at 11/2/2020 1749  Last data filed at 11/2/2020 1733  Gross per 24 hour   Intake 3616.25 ml   Output 1670 ml   Net 1946.25 ml      Physical Exam  Constitutional:       General: He is not in acute distress.     Appearance: He is well-developed. He is not diaphoretic.      Comments: Awake ,Nonverbal.    HENT:      Head: Normocephalic and atraumatic.      Mouth/Throat:      Pharynx: No oropharyngeal exudate.   Eyes:      Conjunctiva/sclera: Conjunctivae normal.      Pupils: Pupils are equal, round, and reactive to light.   Neck:      Musculoskeletal: Neck supple.      Thyroid: No thyromegaly.      Vascular: No JVD.   Cardiovascular:      Rate and Rhythm: Regular rhythm. Tachycardia present.      Heart sounds: Normal heart sounds. No murmur.   Pulmonary:      Comments: No distress, mild tachypnea , bronchial breath sounds kathia.   Abdominal:      General: There is distension.      Tenderness: There is no abdominal tenderness. There is no guarding or rebound.      Comments: Firm, distended , Bowel sounds - presents    Musculoskeletal: Normal range of motion.   Lymphadenopathy:      Cervical: No cervical adenopathy.   Skin:     General: Skin is warm and dry.      Capillary Refill: Capillary refill takes 2 to 3 seconds.      Findings: No rash.   Neurological:      Comments: Awake and Nonverbal           Significant Labs:   Blood Culture: No results for input(s): LABBLOO in the last 48 hours.  BMP:   Recent Labs   Lab 11/02/20  0441   *      K 3.5   *   CO2 16*   BUN 21*   CREATININE 0.6   CALCIUM 8.1*   MG 1.9     CBC:   Recent Labs   Lab 11/01/20  0443 11/02/20  0441   WBC 19.33* 11.78   HGB 9.7* 7.8*   HCT 31.9* 26.0*   * 487*     CMP:   Recent Labs   Lab 11/01/20  1341 11/01/20  2245 11/02/20 0441    137 136   K 3.2* 2.6* 3.5   * 113* 113*   CO2 15* 16* 16*   * 111* 111*   BUN 23* 22* 21*   CREATININE 0.7 0.6 0.6   CALCIUM 8.5* 8.3* 8.1*   PROT  --   --  6.1   ALBUMIN  --   --  1.8*   BILITOT  --   --  0.1   ALKPHOS  --   --  99   AST  --   --  16   ALT  --   --  10   ANIONGAP 10 8 7*   EGFRNONAA >60 >60 >60     All pertinent labs within the past 24 hours have been reviewed.    Significant Imaging: I have reviewed all pertinent imaging results/findings within the past 24 hours.

## 2020-11-02 NOTE — PROGRESS NOTES
[facility] Hospital Medicine  Progress Note    Patient Name: Glen Moscoso  MRN: [id number]  Patient Class: IP- Inpatient   Admission Date: 10/22/2020  Length of Stay: 11 days  Attending Physician: Barney Guerrero MD  Primary Care Provider: Yadi Lawson MD        Subjective:     Principal Problem:Decubitus ulcer of ischial area, left, stage IV        HPI:  Glen Moscoso is a 25 y.o. male patient with a PMHx of cerebral palsy who presents to the Emergency Department for evaluation of respiratory distress which onset PTA. EMS reports pt was found face down on the pillow in nursing home. Pt arrives tachycardic and tachypneic. Patient recently discharge from [facility] where he was treated for respiratory failure and sepsis. Patient was discharged on 10/10/20 to [facility] on IV Vancomycin, Cefepime and Flagyl to treat pelvic osteomyelitis and resolving colitis. In the ED, WBCs 18.7K, Platelet 681, CO2 20, Lactate 2.3. UA negative. CXR revealed mild atelectasis or infiltrate right perihilar region and right midlung zone. Pt with worsening resp failure Subsequently pt was intubated. Sepsis protocol initiated in the ED including IV hydration. Hospital medicine called for admission. Patient placed in ICU. Patient is a full code, SDM mother, Leni Moscoso at [phone].     Overview/Hospital Course:  10/23  Remains intubated , enteral nutrition initiated. Continued treatment of sepsis due to aspiration pneumonia .due to poor air way protection .  Cultures are with negative growth to date. Temp Max 103 and wbc 19.30   Tachycardia was reported over night , follow up Echo EF 50 % normal diastolic function.  Case discussed with Dr Palma who has agreed for trach placement due to recurrent aspiration pneumonia.    10/24    Remains intubated, Tmax of 101.5 .CT Abd/pelvis - right inferior pubic ramus -cannot exclude     Osteomyelitis.      10/25   Self extubated, presently on Room air    Case discussed  with Dr Palma who has agreed for trach placement due to recurrent aspiration pneumonia.  10/26- Afebrile. Remains on RA with satisfactory SpO2. Trach placement planned for tomorrow . Wound vac placed to Rt./Lt ischium stage IV pressure ulcers with full thickness tissue loss with exposed bone. General Surgery consulted  to evaluate pt for diverting colostomy to prevent fecal contamination of wound . Rectal tube in place. Labs- WBC normalize 10.1, Hbg 9.2, Pl 512, creatinine 0.5   10/27- S/P tracheostomy placement today. Remains sedated on mechanical ventilation via  trach . Labs are unremarkable . General Surgery is recommending to continue wound vac and rectal tube for now , diverting colostomy is not suggested at this time.   10/28- Tmax 99. Transition to trach collar . SpO2 100% on O2 at 5 L/min . No distress noted . Per nursing staff liquid stool sips around Rectal tube . Wound vac contaminated with liquid stool . Spoke to Dr. Ortez and he will evaluate pt for diverting colostomy . ID consult obtained and recs are noted. Add Questran powder for loose stool. Labs are unremarkable.   10/29- Afebrile . Trach collar in place. Patient to go to OR for diverting colostomy today. Antibiotic continues.   10/30- s/p Laparoscopic sigmoid loop colostomy construction performed yesterday. Leakage noted around G-tube . Discussed with Dr. Ortez and recommend Fluro gastric tube study . Hold PEG feeding for now.   10/31- Afebrile . Appears tachypnic . Trach collar in place . CXR this morning resulted clear lungs . SpO2 is satisfactory on FiO2 21%.  Harsha button replaced with a new G tube yesterday by IR due to leakage. Per IR, the G tube may be exchanged for a harsha button at a future date . Abdomen appears distended , however good bowel sounds appreciated . LLQ colostomy stoma is beefy red with dark  liquid noted in the colostomy bag. Antibiotic continues for stage IV decubitus ulcer and presumed  Osteomyelitis. Disposition -  [facility] once colostomy functions and output begins.  11/1- Abdomen is markedly distended . X-ray abd showed severe gaseous distention of the colon . Dr. Barry performed Iglesias catheter decompression with improvement of distension. No stool output noted . Per Surgery Ok to continue tube feed. Pt was noted to be tachycardic , tachypnic possibly due to discomfort from abdominal distention. Labs resulted persistent hypokalemia and metabolic acidosis with elevated BUN. Replete electrolytes and volume. Leukocytosis is noted which  likely reactive. Antibiotic continues for stage IV decubitus ulcer and presumed  Osteomyelitis. Disposition - [facility] once colostomy functions and output begins.  11/2 - Patient with good output from ostomy. Plan for LTAC in progress. Hgb stable 7.8. Leukocytosis resolved. Discussed with care team.       Interval History: Patient improved with good output from ostomy. Await LTAC placement.     Review of Systems   Unable to perform ROS: Patient nonverbal     Objective:     Vital Signs (Most Recent):  Temp: 99 °F (37.2 °C) (11/02/20 1510)  Pulse: 108 (11/02/20 1700)  Resp: (!) 21 (11/02/20 1628)  BP: 108/67 (11/02/20 1510)  SpO2: 98 % (11/02/20 1628) Vital Signs (24h Range):  Temp:  [97.5 °F (36.4 °C)-101.7 °F (38.7 °C)] 99 °F (37.2 °C)  Pulse:  [] 108  Resp:  [16-40] 21  SpO2:  [95 %-100 %] 98 %  BP: (108-128)/(65-88) 108/67     Weight: 38 kg (83 lb 12.4 oz)  Body mass index is 18.13 kg/m².    Intake/Output Summary (Last 24 hours) at 11/2/2020 1749  Last data filed at 11/2/2020 1733  Gross per 24 hour   Intake 3616.25 ml   Output 1670 ml   Net 1946.25 ml      Physical Exam  Constitutional:       General: He is not in acute distress.     Appearance: He is well-developed. He is not diaphoretic.      Comments: Awake ,Nonverbal.    HENT:      Head: Normocephalic and atraumatic.      Mouth/Throat:      Pharynx: No oropharyngeal exudate.   Eyes:      Conjunctiva/sclera: Conjunctivae normal.       Pupils: Pupils are equal, round, and reactive to light.   Neck:      Musculoskeletal: Neck supple.      Thyroid: No thyromegaly.      Vascular: No JVD.   Cardiovascular:      Rate and Rhythm: Regular rhythm. Tachycardia present.      Heart sounds: Normal heart sounds. No murmur.   Pulmonary:      Comments: No distress, mild tachypnea , bronchial breath sounds kathia.   Abdominal:      General: There is distension.      Tenderness: There is no abdominal tenderness. There is no guarding or rebound.      Comments: Firm, distended , Bowel sounds - presents    Musculoskeletal: Normal range of motion.   Lymphadenopathy:      Cervical: No cervical adenopathy.   Skin:     General: Skin is warm and dry.      Capillary Refill: Capillary refill takes 2 to 3 seconds.      Findings: No rash.   Neurological:      Comments: Awake and Nonverbal          Significant Labs:   Blood Culture: No results for input(s): LABBLOO in the last 48 hours.  BMP:   Recent Labs   Lab 11/02/20  0441   *      K 3.5   *   CO2 16*   BUN 21*   CREATININE 0.6   CALCIUM 8.1*   MG 1.9     CBC:   Recent Labs   Lab 11/01/20  0443 11/02/20  0441   WBC 19.33* 11.78   HGB 9.7* 7.8*   HCT 31.9* 26.0*   * 487*     CMP:   Recent Labs   Lab 11/01/20  1341 11/01/20  2245 11/02/20  0441    137 136   K 3.2* 2.6* 3.5   * 113* 113*   CO2 15* 16* 16*   * 111* 111*   BUN 23* 22* 21*   CREATININE 0.7 0.6 0.6   CALCIUM 8.5* 8.3* 8.1*   PROT  --   --  6.1   ALBUMIN  --   --  1.8*   BILITOT  --   --  0.1   ALKPHOS  --   --  99   AST  --   --  16   ALT  --   --  10   ANIONGAP 10 8 7*   EGFRNONAA >60 >60 >60     All pertinent labs within the past 24 hours have been reviewed.    Significant Imaging: I have reviewed all pertinent imaging results/findings within the past 24 hours.      Assessment/Plan:      * Decubitus ulcer of ischial area, left, stage IV  Wound Care  Antibiotics per ID  PICC in place  Possible osteomyelitis   Wound vac  placed   General Surgery consult to evaluate for diverting colostomy to prevent fecal contamination of wound.   10/29-   Patient to go to OR for diverting colostomy today  10/30 -  s/p Laparoscopic sigmoid loop colostomy construction performed yesterday  10/31-  Antibiotic continues per ID recs . Disposition - [facility]   11/2  ABX  Wound Care          Status post colostomy  - Laparoscopic colostomy creation -10/29/20   11/1  Post op Day # 3  Severe gaseous distention of colon noted. Iglesias catheter placed through the stoma for decompression. No stool output noted.       Leaking PEG tube  10/30-  Fluro Gastric tube study today.   10/31-  Harsha button replaced with a new G tube yesterday by IR due to leakage. Per IR, the G tube may be exchanged for a harsha button at a future date         Status post tracheostomy  -Trach placed( 10/27/20)  and transition to trach collar       Right lower lobe pneumonia  Recurrent Aspiration Pneumonia  S/P Trach placement 10/27/20  PEG tube for feeding and medication administration   Continue antibiotic for 5 to 7 days   ID consult obtained . 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  -Transfuse P-RBC for Hgb 7 or under       Seizure disorder  Phenobarbital  Neuro checks    Cerebral palsy  Supportive care       VTE Risk Mitigation (From admission, onward)         Ordered     enoxaparin injection 30 mg  Every 24 hours      10/27/20 1517     IP VTE HIGH RISK PATIENT  Once      10/22/20 1825     Place sequential compression device  Until discontinued      10/22/20  1754                Discharge Planning   ROCÍO: 11/6/2020     Code Status: Full Code   Is the patient medically ready for discharge?:     Reason for patient still in hospital (select all that apply): Patient trending condition, Consult recommendations and Pending disposition  Discharge Plan A: Long-term acute care facility (LTAC)                 Barney Guerrero MD  Department of Hospital Medicine   [facility]

## 2020-11-02 NOTE — ASSESSMENT & PLAN NOTE
Stage IV bilateral decub ulcers with diarrhea and stool contamination now s/p lap colostomy construction on 10/29/2020    - cont rizvi via ostomy for decompression to allow edema and swelling to subside  - cont WCON education and care for colostomy  - okay for TFs from surgical standpoint

## 2020-11-02 NOTE — NURSING
Small scab found on posterior left wrist, beneath mitten restraint. Mitten repositioned and scab covered w/ mepilex dressing. No redness or drainage noted. Will continue to monitor.

## 2020-11-02 NOTE — PROGRESS NOTES
Trach care done; inner cannula changed; site cleaned; gauze replaced; ambu at bedside; extra trach at head of bed; on 21% trach collar.

## 2020-11-02 NOTE — SUBJECTIVE & OBJECTIVE
Interval History: No acute events overnight. Iglesias in stoma with stool and flatus in bag.     Medications:  Continuous Infusions:   custom IV infusion builder 75 mL/hr at 11/02/20 0224     Scheduled Meds:   baclofen  10 mg Per G Tube TID    ceFEPime (MAXIPIME) IVPB  2 g Intravenous Q8H    chlorhexidine  15 mL Mouth/Throat BID    enoxaparin  30 mg Subcutaneous Q24H    famotidine  20 mg Intravenous BID    gabapentin  250 mg Per G Tube QHS    metroNIDAZOLE  500 mg Per G Tube Q8H    miconazole nitrate 2%   Topical (Top) BID    PHENobarbitaL  100 mg Per G Tube QHS    propranoloL  30 mg Per G Tube TID    sodium chloride 3%  4 mL Nebulization Q8H    vancomycin (VANCOCIN) IVPB  750 mg Intravenous Q24H     PRN Meds:acetaminophen, albuterol-ipratropium, bisacodyL, influenza, morphine, pneumoc 13-robby conj-dip cr(PF), vancomycin - pharmacy to dose     Review of patient's allergies indicates:  No Known Allergies  Objective:     Vital Signs (Most Recent):  Temp: 97.5 °F (36.4 °C) (11/02/20 0323)  Pulse: 110 (11/02/20 0500)  Resp: (!) 23 (11/02/20 0323)  BP: 128/85 (11/02/20 0323)  SpO2: 100 % (11/02/20 0323) Vital Signs (24h Range):  Temp:  [97.4 °F (36.3 °C)-101.7 °F (38.7 °C)] 97.5 °F (36.4 °C)  Pulse:  [] 110  Resp:  [18-36] 23  SpO2:  [97 %-100 %] 100 %  BP: (111-167)/() 128/85     Weight: 38 kg (83 lb 12.4 oz)  Body mass index is 18.13 kg/m².    Intake/Output - Last 3 Shifts       10/31 0700 - 11/01 0659 11/01 0700 - 11/02 0659 11/02 0700 - 11/03 0659    P.O.  60     I.V. (mL/kg)       NG/GT 1095 640     IV Piggyback 250      Total Intake(mL/kg) 1345 (40.8) 700 (18.4)     Urine (mL/kg/hr) 140 (0.2) 875 (1)     Other       Stool 65 700     Total Output 205 1575     Net +1140 -875            Stool Occurrence  1 x           Physical Exam  Constitutional:       Appearance: He is well-developed.   HENT:      Head: Atraumatic.   Eyes:      Conjunctiva/sclera: Conjunctivae normal.   Neck:      Thyroid: No  thyromegaly.   Cardiovascular:      Rate and Rhythm: Regular rhythm.   Pulmonary:      Comments: Trach in place  Abdominal:      Comments: Gtube in place in LUQ; incisions c/d/i; soft, +distention; ostomy pink, edematous (improved) with some swelling and rizvi in place in proximal limb with stool/gas in bag   Musculoskeletal:         General: No tenderness.   Skin:     General: Skin is warm and dry.      Capillary Refill: Capillary refill takes less than 2 seconds.      Comments: +bilateral decubitus posterior ulcers with wound vacs in place with good seal/sxn   Neurological:      Mental Status: He is alert. Mental status is at baseline.         Significant Labs:  CBC:   Recent Labs   Lab 11/02/20  0441   WBC 11.78   RBC 2.81*   HGB 7.8*   HCT 26.0*   *   MCV 93   MCH 27.8   MCHC 30.0*     BMP:   Recent Labs   Lab 11/02/20  0441   *      K 3.5   *   CO2 16*   BUN 21*   CREATININE 0.6   CALCIUM 8.1*   MG 1.9       Significant Diagnostics:  I have reviewed all pertinent imaging results/findings within the past 24 hours.   KUB: slightly improved

## 2020-11-02 NOTE — PROGRESS NOTES
[facility]   Colorectal Surgery  Progress Note    Subjective:     History of Present Illness:    25-year-old male with a history of cerebral palsy who is admitted to the ICU with respiratory failure and pneumonia.  Patient has had known bilateral decubitus ulcers that have been difficult to heal with osteomyelitis.  Patient has had continued loose stools and difficulty the diverting the stool away from the wounds even with wound VAC therapy.  During this admission he has also undergone a tracheostomy by ENT.  Colorectal surgery is consulted for evaluation for possible diverting colostomy given the difficulty with diarrhea and wound contamination from stool as the wounds are so close to his anus.  Per his mother who is his primary caregiver and surrogate decision maker, the patient has had a feeding tube placed and his abdomen.  Patient is nonverbal.    Post-Op Info:  Procedure(s) (LRB):  CREATION, COLOSTOMY, LAPAROSCOPIC (N/A)   4 Days Post-Op     Interval History: No acute events overnight. Iglesias in stoma with stool and flatus in bag.     Medications:  Continuous Infusions:   custom IV infusion builder 75 mL/hr at 11/02/20 0224     Scheduled Meds:   baclofen  10 mg Per G Tube TID    ceFEPime (MAXIPIME) IVPB  2 g Intravenous Q8H    chlorhexidine  15 mL Mouth/Throat BID    enoxaparin  30 mg Subcutaneous Q24H    famotidine  20 mg Intravenous BID    gabapentin  250 mg Per G Tube QHS    metroNIDAZOLE  500 mg Per G Tube Q8H    miconazole nitrate 2%   Topical (Top) BID    PHENobarbitaL  100 mg Per G Tube QHS    propranoloL  30 mg Per G Tube TID    sodium chloride 3%  4 mL Nebulization Q8H    vancomycin (VANCOCIN) IVPB  750 mg Intravenous Q24H     PRN Meds:acetaminophen, albuterol-ipratropium, bisacodyL, influenza, morphine, pneumoc 13-robby conj-dip cr(PF), vancomycin - pharmacy to dose     Review of patient's allergies indicates:  No Known Allergies  Objective:     Vital Signs (Most  Recent):  Temp: 97.5 °F (36.4 °C) (11/02/20 0323)  Pulse: 110 (11/02/20 0500)  Resp: (!) 23 (11/02/20 0323)  BP: 128/85 (11/02/20 0323)  SpO2: 100 % (11/02/20 0323) Vital Signs (24h Range):  Temp:  [97.4 °F (36.3 °C)-101.7 °F (38.7 °C)] 97.5 °F (36.4 °C)  Pulse:  [] 110  Resp:  [18-36] 23  SpO2:  [97 %-100 %] 100 %  BP: (111-167)/() 128/85     Weight: 38 kg (83 lb 12.4 oz)  Body mass index is 18.13 kg/m².    Intake/Output - Last 3 Shifts       10/31 0700 - 11/01 0659 11/01 0700 - 11/02 0659 11/02 0700 - 11/03 0659    P.O.  60     I.V. (mL/kg)       NG/GT 1095 640     IV Piggyback 250      Total Intake(mL/kg) 1345 (40.8) 700 (18.4)     Urine (mL/kg/hr) 140 (0.2) 875 (1)     Other       Stool 65 700     Total Output 205 1575     Net +1140 -875            Stool Occurrence  1 x           Physical Exam  Constitutional:       Appearance: He is well-developed.   HENT:      Head: Atraumatic.   Eyes:      Conjunctiva/sclera: Conjunctivae normal.   Neck:      Thyroid: No thyromegaly.   Cardiovascular:      Rate and Rhythm: Regular rhythm.   Pulmonary:      Comments: Trach in place  Abdominal:      Comments: Gtube in place in LUQ; incisions c/d/i; soft, +distention; ostomy pink, edematous (improved) with some swelling and rizvi in place in proximal limb with stool/gas in bag   Musculoskeletal:         General: No tenderness.   Skin:     General: Skin is warm and dry.      Capillary Refill: Capillary refill takes less than 2 seconds.      Comments: +bilateral decubitus posterior ulcers with wound vacs in place with good seal/sxn   Neurological:      Mental Status: He is alert. Mental status is at baseline.         Significant Labs:  CBC:   Recent Labs   Lab 11/02/20 0441   WBC 11.78   RBC 2.81*   HGB 7.8*   HCT 26.0*   *   MCV 93   MCH 27.8   MCHC 30.0*     BMP:   Recent Labs   Lab 11/02/20 0441   *      K 3.5   *   CO2 16*   BUN 21*   CREATININE 0.6   CALCIUM 8.1*   MG 1.9        Significant Diagnostics:  I have reviewed all pertinent imaging results/findings within the past 24 hours.   KUB: slightly improved    Assessment/Plan:     * Decubitus ulcer of ischial area, left, stage IV  Stage IV bilateral decub ulcers with diarrhea and stool contamination now s/p lap colostomy construction on 10/29/2020    - cont rizvi via ostomy for decompression to allow edema and swelling to subside  - cont WCON education and care for colostomy  - okay for TFs from surgical standpoint    Leaking PEG tube  Eval via tube study and replace if there is ongoing leakage and/or defect seen    Status post tracheostomy  Management per primary team    Right lower lobe pneumonia  Management per primary team    Osteomyelitis of pelvis, presumed   Management per primary team    Severe protein-calorie malnutrition  Management per primary team    Seizure disorder  Management per primary team    Cerebral palsy  Management per primary team        Michael Ortez MD  Colorectal Surgery  [facility]

## 2020-11-02 NOTE — NURSING
Tube feeding stopped at 08:20. Notified Dr. Guerrero, as this was ordered to be stopped at midnight.

## 2020-11-03 VITALS
HEIGHT: 60 IN | RESPIRATION RATE: 19 BRPM | WEIGHT: 83.75 LBS | OXYGEN SATURATION: 98 % | TEMPERATURE: 98 F | DIASTOLIC BLOOD PRESSURE: 76 MMHG | SYSTOLIC BLOOD PRESSURE: 120 MMHG | BODY MASS INDEX: 16.44 KG/M2 | HEART RATE: 105 BPM

## 2020-11-03 PROBLEM — R00.0 TACHYCARDIA: Status: RESOLVED | Noted: 2020-10-22 | Resolved: 2020-11-03

## 2020-11-03 PROBLEM — K94.23 LEAKING PEG TUBE: Status: RESOLVED | Noted: 2020-10-30 | Resolved: 2020-11-03

## 2020-11-03 LAB
ANION GAP SERPL CALC-SCNC: 10 MMOL/L (ref 8–16)
ANION GAP SERPL CALC-SCNC: 9 MMOL/L (ref 8–16)
BUN SERPL-MCNC: 24 MG/DL (ref 6–20)
BUN SERPL-MCNC: 26 MG/DL (ref 6–20)
CALCIUM SERPL-MCNC: 8.3 MG/DL (ref 8.7–10.5)
CALCIUM SERPL-MCNC: 8.9 MG/DL (ref 8.7–10.5)
CHLORIDE SERPL-SCNC: 114 MMOL/L (ref 95–110)
CHLORIDE SERPL-SCNC: 115 MMOL/L (ref 95–110)
CO2 SERPL-SCNC: 14 MMOL/L (ref 23–29)
CO2 SERPL-SCNC: 15 MMOL/L (ref 23–29)
CREAT SERPL-MCNC: 0.6 MG/DL (ref 0.5–1.4)
CREAT SERPL-MCNC: 0.7 MG/DL (ref 0.5–1.4)
EST. GFR  (AFRICAN AMERICAN): >60 ML/MIN/1.73 M^2
EST. GFR  (AFRICAN AMERICAN): >60 ML/MIN/1.73 M^2
EST. GFR  (NON AFRICAN AMERICAN): >60 ML/MIN/1.73 M^2
EST. GFR  (NON AFRICAN AMERICAN): >60 ML/MIN/1.73 M^2
GLUCOSE SERPL-MCNC: 103 MG/DL (ref 70–110)
GLUCOSE SERPL-MCNC: 104 MG/DL (ref 70–110)
MAGNESIUM SERPL-MCNC: 1.9 MG/DL (ref 1.6–2.6)
PHOSPHATE SERPL-MCNC: 2.7 MG/DL (ref 2.7–4.5)
POTASSIUM SERPL-SCNC: 2.7 MMOL/L (ref 3.5–5.1)
POTASSIUM SERPL-SCNC: 3.1 MMOL/L (ref 3.5–5.1)
SODIUM SERPL-SCNC: 138 MMOL/L (ref 136–145)
SODIUM SERPL-SCNC: 139 MMOL/L (ref 136–145)
VANCOMYCIN TROUGH SERPL-MCNC: 16.4 UG/ML (ref 10–22)

## 2020-11-03 PROCEDURE — 80048 BASIC METABOLIC PNL TOTAL CA: CPT | Mod: 91

## 2020-11-03 PROCEDURE — 25000003 PHARM REV CODE 250: Performed by: NURSE PRACTITIONER

## 2020-11-03 PROCEDURE — 63600175 PHARM REV CODE 636 W HCPCS: Performed by: NURSE PRACTITIONER

## 2020-11-03 PROCEDURE — 99900035 HC TECH TIME PER 15 MIN (STAT)

## 2020-11-03 PROCEDURE — 25000003 PHARM REV CODE 250: Performed by: INTERNAL MEDICINE

## 2020-11-03 PROCEDURE — 25000242 PHARM REV CODE 250 ALT 637 W/ HCPCS: Performed by: NURSE PRACTITIONER

## 2020-11-03 PROCEDURE — 36415 COLL VENOUS BLD VENIPUNCTURE: CPT

## 2020-11-03 PROCEDURE — 94761 N-INVAS EAR/PLS OXIMETRY MLT: CPT

## 2020-11-03 PROCEDURE — 94640 AIRWAY INHALATION TREATMENT: CPT

## 2020-11-03 PROCEDURE — 84100 ASSAY OF PHOSPHORUS: CPT

## 2020-11-03 PROCEDURE — 63600175 PHARM REV CODE 636 W HCPCS: Performed by: INTERNAL MEDICINE

## 2020-11-03 PROCEDURE — 80202 ASSAY OF VANCOMYCIN: CPT

## 2020-11-03 PROCEDURE — 99900026 HC AIRWAY MAINTENANCE (STAT)

## 2020-11-03 PROCEDURE — 83735 ASSAY OF MAGNESIUM: CPT

## 2020-11-03 PROCEDURE — 80048 BASIC METABOLIC PNL TOTAL CA: CPT

## 2020-11-03 PROCEDURE — 31720 CLEARANCE OF AIRWAYS: CPT

## 2020-11-03 RX ORDER — METRONIDAZOLE 500 MG/1
500 TABLET ORAL EVERY 8 HOURS
Qty: 30 TABLET | Refills: 0 | Status: ON HOLD
Start: 2020-11-03 | End: 2020-11-14

## 2020-11-03 RX ORDER — PHENOBARBITAL 20 MG/5ML
100 ELIXIR ORAL NIGHTLY
Qty: 750 ML | Refills: 3
Start: 2020-11-03 | End: 2020-12-03

## 2020-11-03 RX ORDER — CEFEPIME HYDROCHLORIDE 1 G/50ML
2 INJECTION, SOLUTION INTRAVENOUS EVERY 8 HOURS
Qty: 4200 ML | Refills: 0
Start: 2020-11-03 | End: 2020-12-01

## 2020-11-03 RX ORDER — POTASSIUM CHLORIDE 1.5 G/1.58G
40 POWDER, FOR SOLUTION ORAL
Status: COMPLETED | OUTPATIENT
Start: 2020-11-03 | End: 2020-11-03

## 2020-11-03 RX ADMIN — CEFEPIME HYDROCHLORIDE 2 G: 2 INJECTION, SOLUTION INTRAVENOUS at 03:11

## 2020-11-03 RX ADMIN — SODIUM CHLORIDE 30 MG/ML INHALATION SOLUTION 4 ML: 30 SOLUTION INHALANT at 08:11

## 2020-11-03 RX ADMIN — CEFEPIME HYDROCHLORIDE 2 G: 2 INJECTION, SOLUTION INTRAVENOUS at 01:11

## 2020-11-03 RX ADMIN — POTASSIUM CHLORIDE 40 MEQ: 1.5 POWDER, FOR SOLUTION ORAL at 11:11

## 2020-11-03 RX ADMIN — PROPRANOLOL HYDROCHLORIDE 30 MG: 20 SOLUTION ORAL at 09:11

## 2020-11-03 RX ADMIN — METRONIDAZOLE 500 MG: 500 TABLET ORAL at 02:11

## 2020-11-03 RX ADMIN — PROPRANOLOL HYDROCHLORIDE 30 MG: 20 SOLUTION ORAL at 02:11

## 2020-11-03 RX ADMIN — POTASSIUM CHLORIDE 40 MEQ: 1.5 POWDER, FOR SOLUTION ORAL at 12:11

## 2020-11-03 RX ADMIN — BACLOFEN 10 MG: 10 TABLET ORAL at 09:11

## 2020-11-03 RX ADMIN — VANCOMYCIN HYDROCHLORIDE 750 MG: 750 INJECTION, POWDER, LYOPHILIZED, FOR SOLUTION INTRAVENOUS at 06:11

## 2020-11-03 RX ADMIN — MICONAZOLE NITRATE: 2 OINTMENT TOPICAL at 09:11

## 2020-11-03 RX ADMIN — CEFEPIME HYDROCHLORIDE 2 G: 2 INJECTION, SOLUTION INTRAVENOUS at 09:11

## 2020-11-03 RX ADMIN — BACLOFEN 10 MG: 10 TABLET ORAL at 02:11

## 2020-11-03 RX ADMIN — CHLORHEXIDINE GLUCONATE 0.12% ORAL RINSE 15 ML: 1.2 LIQUID ORAL at 09:11

## 2020-11-03 RX ADMIN — POTASSIUM CHLORIDE 40 MEQ: 1.5 POWDER, FOR SOLUTION ORAL at 02:11

## 2020-11-03 RX ADMIN — SODIUM CHLORIDE 30 MG/ML INHALATION SOLUTION 4 ML: 30 SOLUTION INHALANT at 12:11

## 2020-11-03 RX ADMIN — METRONIDAZOLE 500 MG: 500 TABLET ORAL at 05:11

## 2020-11-03 NOTE — PLAN OF CARE
[facility] [place] [facility].    Please call report to [phone].  Transportation time is pending.    D/c orders, mar, and avs sent via [facility].       11/03/20 1352   Post-Acute Status   Post-Acute Authorization Placement   Post-Acute Placement Status Set-up Complete   Discharge Plan   Discharge Plan A Long-term acute care facility (LTAC)

## 2020-11-03 NOTE — PROGRESS NOTES
F/U visit with Mr. Moscoso for continued care. New consult received due to scab to left wrist underlying mitten restraint. Mitten restraints removed bilaterally. There is a small tan intact scab noted to left wrist, measures 1x0.5x0.1cm. no surrounding erythema noted, no drainage, not pressure-related wound. Recommend leave [facility]. Mitten restraints reapplied. Ostomy pouch changed due to leaking. Iglesias again noted to stoma with thin tan liquid stool in pouch. Nayely stomal skin remains intact. Stoma remains edematous and dark red, moist. Nayely stomal skin prepped with cavilon and one piece flat pouch applied. Wound vacs to bilateral ischiums intact with good seal, due for dressing change tomorrow. Will follow.

## 2020-11-03 NOTE — NURSING
2125: Administered morphine for pain because patient was visibily uncomfortable screaming silently because patient is nonverbal. Red patches and hive like rash began near PICC site and was spreading across the patient's chest up toward his shoulder. Notified hospitalist immediately. VSS. No SS of respiratory distress noted.    2146: Administered benadryl, solu-medrol, and pepcid IV. Patient tolerated well.     2200: Rash subsiding. Will continue to monitor. Zhanna Lazcano RN.

## 2020-11-03 NOTE — PLAN OF CARE
Problem: Restraint, Nonbehavioral (Nonviolent)  Goal: Discontinuation Criteria Achieved  Outcome: Ongoing, Progressing     Problem: Restraint, Nonbehavioral (Nonviolent)  Goal: Personal Dignity and Safety Maintained  Outcome: Ongoing, Progressing

## 2020-11-03 NOTE — NURSING
Spoke to pt's mother to notify of discharge and that transportation is here to get pt. She declined his flu/PNA vaccines. Pt picked up by [facility]. Pt sent w/ central line and rizvi in place. Clamped wound vac and covered end w/ sterile gauze and tape.  Pt left on O2 via trach collar. Transported on stretcher

## 2020-11-03 NOTE — PLAN OF CARE
Per Dr. Guerrero, tube feeds resumed this evening. Pt will need to be NPO after midnight tonight for possible surgery tomorrow, so tube feeding needs to be stopped at 00:00. Waiting for dietary to bring new tube feed bag to replace set-up.    Mitten restraints continued. Order expires 11/3/20 at 00:03.    Colostomy monitored closely and emptied as needed. Catheter still in place in ostomy for decompression.     Pt at rest and in no distress w/ oxygen via trach collar. Will continue to monitor.

## 2020-11-03 NOTE — PROGRESS NOTES
Pharmacokinetic Assessment Follow Up: IV Vancomycin    Vancomycin serum concentration assessment(s):    The trough level was drawn correctly and can be used to guide therapy at this time. The measurement is within the desired definitive target range of 15 to 20 mcg/mL.    Vancomycin Regimen Plan:    Continue regimen to Vancomycin 750 mg IV every 24 hours with next serum trough concentration measured at 0600 prior to next dose dose on 11/5    Drug levels (last 3 results):  Recent Labs   Lab Result Units 11/01/20  0237 11/01/20  1341 11/02/20  0441 11/03/20  0635   Vancomycin, Random ug/mL  --  23.3 12.1  --    Vancomycin-Trough ug/mL 28.0*  --   --  16.4       Pharmacy will continue to follow and monitor vancomycin.    Please contact pharmacy at extension [phone] for questions regarding this assessment.    Thank you for the consult,   Elena Mccabe [facility]       Patient brief summary:  Glen Moscoso is a 25 y.o. 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 /aspiration pneumonia    Note: History of cerebral palsy      Drug Allergies:   Review of patient's allergies indicates:  No Known Allergies    Actual Body Weight:   38 kg    Renal Function:   Estimated Creatinine Clearance: 102.9 mL/min (based on SCr of 0.6 mg/dL).,     Dialysis Method (if applicable):  No dialysis    CBC (last 72 hours):  Recent Labs   Lab Result Units 11/01/20  0443 11/02/20  0441   WBC K/uL 19.33* 11.78   Hemoglobin g/dL 9.7* 7.8*   Hematocrit % 31.9* 26.0*   Platelets K/uL 663* 487*   Gran % % 84.9* 77.0*   Lymph % % 4.5* 10.4*   Mono % % 6.4 8.3   Eosinophil % % 0.0 0.0   Basophil % % 0.2 0.1   Differential Method  Automated Automated       Metabolic Panel (last 72 hours):  Recent Labs   Lab Result Units 10/31/20  1530 11/01/20  0443 11/01/20  1341 11/01/20  2245 11/02/20  0441 11/03/20  0635   Sodium mmol/L 135* 137 137 137 136  --     Potassium mmol/L 3.2* 2.5* 3.2* 2.6* 3.5  --    Chloride mmol/L 109 112* 112* 113* 113*  --    CO2 mmol/L 17* 14* 15* 16* 16*  --    Glucose mg/dL 162* 184* 213* 111* 111*  --    BUN mg/dL 18 22* 23* 22* 21*  --    Creatinine mg/dL 0.7 0.7 0.7 0.6 0.6  --    Albumin g/dL  --   --   --   --  1.8*  --    Total Bilirubin mg/dL  --   --   --   --  0.1  --    Alkaline Phosphatase U/L  --   --   --   --  99  --    AST U/L  --   --   --   --  16  --    ALT U/L  --   --   --   --  10  --    Magnesium mg/dL  --  2.3  --   --  1.9 1.9   Phosphorus mg/dL  --  2.0*  --   --  1.4* 2.7       Vancomycin Administrations:  vancomycin given in the last 96 hours                     vancomycin 750 mg in dextrose 5 % 250 mL IVPB (ready to mix system) (mg) 750 mg New Bag 11/03/20 0647     750 mg New Bag 11/02/20 0853    vancomycin 500 mg in dextrose 5 % 100 mL IVPB (ready to mix system) (mg) 500 mg New Bag 11/01/20 0414     500 mg New Bag 10/31/20 1638     500 mg New Bag  0332     500 mg New Bag 10/30/20 1601                    Microbiologic Results:  Microbiology Results (last 7 days)       Procedure Component Value Units Date/Time    Blood culture [[id number]] Collected: 11/02/20 0808    Order Status: Completed Specimen: Blood from Antecubital, Right Updated: 11/03/20 0115     Blood Culture, Routine No Growth to date    Blood culture [[id number]] Collected: 11/02/20 0808    Order Status: Completed Specimen: Blood from Antecubital, Left Updated: 11/03/20 0115     Blood Culture, Routine No Growth to date    Blood culture [[id number]]     Order Status: Canceled Specimen: Blood     Blood culture [[id number]]     Order Status: Canceled Specimen: Blood     Blood culture x two cultures. Draw prior to antibiotics. [[id number]] Collected: 10/22/20 1545    Order Status: Completed Specimen: Blood from Peripheral, Upper Arm, Right Updated: 10/27/20 7109     Blood Culture, Routine No growth after 5 days.    Narrative:      Aerobic and anaerobic     Blood culture x two cultures. Draw prior to antibiotics. [[id number]] Collected: 10/22/20 1500    Order Status: Completed Specimen: Blood from Peripheral, Upper Arm, Right Updated: 10/27/20 4038     Blood Culture, Routine No growth after 5 days.    Narrative:      Aerobic and anaerobic

## 2020-11-03 NOTE — PLAN OF CARE
11/03/20 1528   Final Note   Assessment Type Final Discharge Note   Anticipated Discharge Disposition LTAC   Right Care Referral Info   Post Acute Recommendation Other   Referral Type LTAC   Facility Name [facility]

## 2020-11-04 ENCOUNTER — NURSE TRIAGE (OUTPATIENT)
Dept: ADMINISTRATIVE | Facility: CLINIC | Age: 25
End: 2020-11-04

## 2020-11-04 NOTE — TELEPHONE ENCOUNTER
Patient on [facility] post procedure call back symptom tracker.  Patient resides in [facility].  No contact required

## 2020-11-06 NOTE — DISCHARGE SUMMARY
[facility] Hospital Medicine  Discharge Summary      Patient Name: Glen Moscoso  MRN: [id number]  Admission Date: 10/22/2020  Hospital Length of Stay: 12 days  Discharge Date and Time: 11/3/2020  4:47 PM  Attending Physician: No att. providers found   Discharging Provider: Barney Guerrero MD  Primary Care Provider: Yadi Lawson MD      HPI:   Glen Moscoso is a 25 y.o. male patient with a PMHx of cerebral palsy who presents to the Emergency Department for evaluation of respiratory distress which onset PTA. EMS reports pt was found face down on the pillow in nursing home. Pt arrives tachycardic and tachypneic. Patient recently discharge from [facility] where he was treated for respiratory failure and sepsis. [facility], Cefepime and Flagyl to treat pelvic osteomyelitis and resolving colitis. In the ED, WBCs 18.7K, Platelet 681, CO2 20, Lactate 2.3. UA negative. CXR revealed mild atelectasis or infiltrate right perihilar region and right midlung zone. Pt with worsening resp failure Subsequently pt was intubated. Sepsis protocol initiated in the ED including IV hydration. Hospital medicine called for admission. Patient placed in ICU. Patient is a full code, SDM mother, Leni Moscoso at [phone].     Procedure(s) (LRB):  CREATION, COLOSTOMY, LAPAROSCOPIC (N/A)      Hospital Course:   10/23  Remains intubated , enteral nutrition initiated. Continued treatment of sepsis due to aspiration pneumonia .due to poor air way protection .  Cultures are with negative growth to date. Temp Max 103 and wbc 19.30   Tachycardia was reported over night , follow up Echo EF 50 % normal diastolic function.  Case discussed with Dr Palma who has agreed for trach placement due to recurrent aspiration pneumonia.    10/24    Remains intubated, Tmax of 101.5 .CT Abd/pelvis - right inferior pubic ramus -cannot exclude     Osteomyelitis.      10/25   Self extubated,  presently on Room air    Case discussed with Dr Palma who has agreed for trach placement due to recurrent aspiration pneumonia.  10/26- Afebrile. Remains on RA with satisfactory SpO2. Trach placement planned for tomorrow . Wound vac placed to Rt./Lt ischium stage IV pressure ulcers with full thickness tissue loss with exposed bone. General Surgery consulted  to evaluate pt for diverting colostomy to prevent fecal contamination of wound . Rectal tube in place. Labs- WBC normalize 10.1, Hbg 9.2, Pl 512, creatinine 0.5   10/27- S/P tracheostomy placement today. Remains sedated on mechanical ventilation via  trach . Labs are unremarkable . General Surgery is recommending to continue wound vac and rectal tube for now , diverting colostomy is not suggested at this time.   10/28- Tmax 99. Transition to trach collar . SpO2 100% on O2 at 5 L/min . No distress noted . Per nursing staff liquid stool sips around Rectal tube . Wound vac contaminated with liquid stool . Spoke to Dr. Ortez and he will evaluate pt for diverting colostomy . ID consult obtained and recs are noted. Add Questran powder for loose stool. Labs are unremarkable.   10/29- Afebrile . Trach collar in place. Patient to go to OR for diverting colostomy today. Antibiotic continues.   10/30- s/p Laparoscopic sigmoid loop colostomy construction performed yesterday. Leakage noted around G-tube . Discussed with Dr. Ortez and recommend Fluro gastric tube study . Hold PEG feeding for now.   10/31- Afebrile . Appears tachypnic . Trach collar in place . CXR this morning resulted clear lungs . SpO2 is satisfactory on FiO2 21%.  Harsha button replaced with a new G tube yesterday by IR due to leakage. Per IR, the G tube may be exchanged for a harsha button at a future date . Abdomen appears distended , however good bowel sounds appreciated . LLQ colostomy stoma is beefy red with dark  liquid noted in the colostomy bag. Antibiotic continues for stage IV decubitus ulcer and  presumed  Osteomyelitis. Disposition - [facility] once colostomy functions and output begins.  11/1- Abdomen is markedly distended . X-ray abd showed severe gaseous distention of the colon . Dr. Barry performed Iglesias catheter decompression with improvement of distension. No stool output noted . Per Surgery Ok to continue tube feed. Pt was noted to be tachycardic , tachypnic possibly due to discomfort from abdominal distention. Labs resulted persistent hypokalemia and metabolic acidosis with elevated BUN. Replete electrolytes and volume. Leukocytosis is noted which  likely reactive. Antibiotic continues for stage IV decubitus ulcer and presumed  Osteomyelitis. Disposition - [facility] once colostomy functions and output begins.  11/2 - Patient with good output from ostomy. Plan for LTAC in progress. Hgb stable 7.8. Leukocytosis resolved. Discussed with care team.   Patient with resumption of bowel function appropriate for LTAC. Patient stable at time of discharge.     Consults:   Consults (From admission, onward)        Status Ordering Provider     Inpatient consult to Colorectal Surgery  Once     Provider:  (Not yet assigned)    Completed JANE DIAL     Inpatient consult to Pulmonology  Once     Provider:  Maikel Garcia MD    Completed KARISSA CARO     Inpatient consult to Registered Dietitian/Nutritionist  Once     Provider:  (Not yet assigned)    Completed KARISSA CARO     Inpatient consult to Registered Dietitian/Nutritionist  Once     Provider:  (Not yet assigned)    Completed JORGE GROVER     Inpatient consult to Social Work/Case Management  Once     Provider:  (Not yet assigned)    Completed JEAN PIERRE FERNANDEZ     IP consult to case management  Once     Provider:  (Not yet assigned)    Completed KARISSA CARO          No new Assessment & Plan notes have been filed under this hospital service since the last note was generated.  Service: Hospital Medicine    Final Active Diagnoses:    Diagnosis  Date Noted POA    PRINCIPAL PROBLEM:  Decubitus ulcer of ischial area, left, stage IV [L89.324] 09/10/2020 Yes     Chronic    Status post colostomy [Z93.3] 10/31/2020 Not Applicable    Osteomyelitis, pelvis [M86.9] 10/30/2020 Yes    Status post tracheostomy [Z93.0] 10/27/2020 Not Applicable    Right lower lobe pneumonia [J18.9] 10/22/2020 Yes    Osteomyelitis of pelvis, presumed  [M86.9] 09/24/2020 Yes     Chronic    Severe protein-calorie malnutrition [E43]  Yes     Chronic    Pressure injury of skin of left buttock [L89.329]  Yes    Anemia, unspecified [D64.9] 08/30/2020 Yes    Seizure disorder [G40.909] 08/26/2020 Yes     Chronic    Cerebral palsy [G80.9] 08/04/2020 Yes     Chronic      Problems Resolved During this Admission:    Diagnosis Date Noted Date Resolved POA    Leaking PEG tube [K94.23] 10/30/2020 11/03/2020 No    Respiratory distress [R06.03] 10/29/2020 10/30/2020 Yes    On mechanically assisted ventilation [Z99.11] 10/27/2020 10/28/2020 Not Applicable    Tachycardia [R00.0] 10/22/2020 11/03/2020 Yes    Acute hypoxemic respiratory failure [J96.01] 10/03/2020 10/27/2020 Yes       Discharged Condition: stable    Disposition: Long Term Acute Care    Follow Up:  Follow-up Information     Yadi Lawson MD In 3 days.    Specialty: Family Medicine  Why: Following discharge from LTAC  Contact information:  [address] [address] [address] [phone]                 Patient Instructions:      Tube Feedings/Formulas   Order Comments: Peptamen 1.5 w/prebio @ 45mL/hr. Initiate at 20mL/hr and progress to 45 per tolerance. Water flushes of 100mL q4 or per MD/NP.     Order Specific Question Answer Comments   Select Adult Formula: Peptamen 1.5 Prebio    Route: Gastrostomy    Formula Rate (mL/hr): 45      Activity as tolerated       Significant Diagnostic Studies:     Pending Diagnostic Studies:     None         Medications:  Reconciled Home Medications:      Medication List      START  taking these medications    cefepime in dextrose 5 % 2 gram/50 mL Pgbk  Commonly known as: MAXIPIME  Inject 50 mLs (2 g total) into the vein every 8 (eight) hours.     miconazole nitrate 2% 2 % Oint  Commonly known as: MICOTIN  Apply topically 2 (two) times daily. for 14 days        CHANGE how you take these medications    metroNIDAZOLE 500 MG tablet  Commonly known as: FLAGYL  1 tablet (500 mg total) by Per G Tube route every 8 (eight) hours. for 10 days  What changed:   · when to take this  · additional instructions     * PHENobarbitaL 20 mg/5 mL (4 mg/mL) Elix elixir  25 mLs (100 mg total) by Per G Tube route every evening.  What changed: Another medication with the same name was added. Make sure you understand how and when to take each.     * PHENobarbitaL 20 mg/5 mL (4 mg/mL) Elix elixir  25 mLs (100 mg total) by Per G Tube route every evening.  What changed: You were already taking a medication with the same name, and this prescription was added. Make sure you understand how and when to take each.     propranoloL 20 MG tablet  Commonly known as: INDERAL  1 tablet (20 mg total) by Per G Tube route 3 (three) times daily.  What changed: when to take this     VANCOMYCIN 750 MG/250 ML D5W (READY TO MIX SYSTEM)  Inject 250 mLs (750 mg total) into the vein 2 (two) times a day.  What changed:   · when to take this  · additional instructions         * This list has 2 medication(s) that are the same as other medications prescribed for you. Read the directions carefully, and ask your doctor or other care provider to review them with you.            CONTINUE taking these medications    acetaminophen 325 MG tablet  Commonly known as: TYLENOL  Take 325 mg by mouth every 4 (four) hours as needed for Pain.     ascorbic acid (vitamin C) 500 MG tablet  Commonly known as: VITAMIN C  1 tablet (500 mg total) by Per G Tube route once daily.     baclofen 10 MG tablet  Commonly known as: LIORESAL  1 tablet (10 mg total) by Per G Tube  route 3 (three) times daily.     bisacodyL 10 mg Supp  Commonly known as: DULCOLAX  Place 1 suppository (10 mg total) rectally daily as needed (Until bowel movement if patient has no bowel movement for 2 days).     diphenhydrAMINE 12.5 mg/5 mL elixir  Commonly known as: BENADRYL  10 mLs (25 mg total) by Per G Tube route 4 (four) times daily as needed for Allergies.     gabapentin 250 mg/5 mL solution  Commonly known as: NEURONTIN  5 mLs (250 mg total) by Per G Tube route nightly. At bedtime     CHEO 7-7-1.5 gram Pwpk  Generic drug: arginine-glutamine-calcium HMB  Take by mouth 2 (two) times a day.     lactobacillus acidophilus & bulgar 100 million cell packet  Commonly known as: LACTINEX  Take 1 tablet by mouth once daily.     melatonin 3 mg tablet  Commonly known as: MELATIN  2 tablets (6 mg total) by Per G Tube route nightly as needed for Insomnia.     multivitamin liquid no.118 Liqd  10 mLs by Per G Tube route once daily.        STOP taking these medications    ceFEPIme 2 gram injection  Commonly known as: MAXIPIME            Indwelling Lines/Drains at time of discharge:   Lines/Drains/Airways     Central Venous Catheter Line            Tunneled Central Line Insertion/Assessment - Double Lumen  09/28/20 1400 right subclavian 38 days          Drain                 Gastrostomy/Enterostomy 08/04/20 1653 Percutaneous endoscopic gastrostomy (PEG) feeding 93 days         Gastrostomy/Enterostomy 10/22/20 1300 LUQ 14 days         Urethral Catheter 10/22/20 1653 Latex 16 Fr. 14 days         Colostomy 10/29/20 1349 Descending/sigmoid LLQ 7 days         Urethral Catheter 11/01/20 1103 Straight-tip 4 days          Airway                 Surgical Airway 10/27/20 0855 Shiley Cuffed 9 days                Time spent on the discharge of patient: 43 minutes  Patient was seen and examined on the date of discharge and determined to be suitable for discharge.         Barney Guerrero MD  Department of Hospital Medicine  [facility] -

## 2020-11-07 LAB
BACTERIA BLD CULT: NORMAL
BACTERIA BLD CULT: NORMAL

## 2020-11-13 ENCOUNTER — HOSPITAL ENCOUNTER (INPATIENT)
Facility: HOSPITAL | Age: 25
LOS: 1 days | Discharge: NURSING, REHAB OR LONG-TERM CARE | DRG: 393 | End: 2020-11-14
Attending: EMERGENCY MEDICINE | Admitting: INTERNAL MEDICINE
Payer: MEDICAID

## 2020-11-13 DIAGNOSIS — E87.6 HYPOKALEMIA: Primary | ICD-10-CM

## 2020-11-13 DIAGNOSIS — Z43.3 ENCOUNTER FOR ATTENTION TO COLOSTOMY: ICD-10-CM

## 2020-11-13 DIAGNOSIS — R11.0 NAUSEA: ICD-10-CM

## 2020-11-13 DIAGNOSIS — E87.0 HYPERNATREMIA: ICD-10-CM

## 2020-11-13 PROBLEM — R53.2 FUNCTIONAL QUADRIPLEGIA: Status: ACTIVE | Noted: 2020-11-13

## 2020-11-13 PROBLEM — K94.03 COLOSTOMY MALFUNCTION: Status: ACTIVE | Noted: 2020-11-13

## 2020-11-13 LAB
ALBUMIN SERPL BCP-MCNC: 1.9 G/DL (ref 3.5–5.2)
ALP SERPL-CCNC: 99 U/L (ref 55–135)
ALT SERPL W/O P-5'-P-CCNC: 7 U/L (ref 10–44)
ANION GAP SERPL CALC-SCNC: 15 MMOL/L (ref 8–16)
AST SERPL-CCNC: 16 U/L (ref 10–40)
BACTERIA #/AREA URNS HPF: ABNORMAL /HPF
BASOPHILS # BLD AUTO: 0.01 K/UL (ref 0–0.2)
BASOPHILS NFR BLD: 0.1 % (ref 0–1.9)
BILIRUB SERPL-MCNC: 0.1 MG/DL (ref 0.1–1)
BILIRUB UR QL STRIP: NEGATIVE
BUN SERPL-MCNC: 18 MG/DL (ref 6–20)
CALCIUM SERPL-MCNC: 8.5 MG/DL (ref 8.7–10.5)
CHLORIDE SERPL-SCNC: 128 MMOL/L (ref 95–110)
CLARITY UR: CLEAR
CO2 SERPL-SCNC: 14 MMOL/L (ref 23–29)
COLOR UR: YELLOW
CREAT SERPL-MCNC: 0.7 MG/DL (ref 0.5–1.4)
DIFFERENTIAL METHOD: ABNORMAL
EOSINOPHIL # BLD AUTO: 0 K/UL (ref 0–0.5)
EOSINOPHIL NFR BLD: 0 % (ref 0–8)
ERYTHROCYTE [DISTWIDTH] IN BLOOD BY AUTOMATED COUNT: 17 % (ref 11.5–14.5)
EST. GFR  (AFRICAN AMERICAN): >60 ML/MIN/1.73 M^2
EST. GFR  (NON AFRICAN AMERICAN): >60 ML/MIN/1.73 M^2
GLUCOSE SERPL-MCNC: 71 MG/DL (ref 70–110)
GLUCOSE UR QL STRIP: NEGATIVE
HCT VFR BLD AUTO: 32.7 % (ref 40–54)
HGB BLD-MCNC: 9.8 G/DL (ref 14–18)
HGB UR QL STRIP: ABNORMAL
HYALINE CASTS #/AREA URNS LPF: 0 /LPF
IMM GRANULOCYTES # BLD AUTO: 0.12 K/UL (ref 0–0.04)
IMM GRANULOCYTES NFR BLD AUTO: 0.9 % (ref 0–0.5)
KETONES UR QL STRIP: ABNORMAL
LEUKOCYTE ESTERASE UR QL STRIP: ABNORMAL
LYMPHOCYTES # BLD AUTO: 1.5 K/UL (ref 1–4.8)
LYMPHOCYTES NFR BLD: 10.9 % (ref 18–48)
MCH RBC QN AUTO: 28.2 PG (ref 27–31)
MCHC RBC AUTO-ENTMCNC: 30 G/DL (ref 32–36)
MCV RBC AUTO: 94 FL (ref 82–98)
MICROSCOPIC COMMENT: ABNORMAL
MONOCYTES # BLD AUTO: 1.4 K/UL (ref 0.3–1)
MONOCYTES NFR BLD: 10 % (ref 4–15)
NEUTROPHILS # BLD AUTO: 10.9 K/UL (ref 1.8–7.7)
NEUTROPHILS NFR BLD: 78.1 % (ref 38–73)
NITRITE UR QL STRIP: NEGATIVE
NRBC BLD-RTO: 0 /100 WBC
PH UR STRIP: 6 [PH] (ref 5–8)
PLATELET # BLD AUTO: 653 K/UL (ref 150–350)
PMV BLD AUTO: 9.6 FL (ref 9.2–12.9)
POTASSIUM SERPL-SCNC: <2 MMOL/L (ref 3.5–5.1)
POTASSIUM UR-SCNC: <10 MMOL/L (ref 15–95)
PROT SERPL-MCNC: 6.5 G/DL (ref 6–8.4)
PROT UR QL STRIP: ABNORMAL
RBC # BLD AUTO: 3.47 M/UL (ref 4.6–6.2)
RBC #/AREA URNS HPF: 80 /HPF (ref 0–4)
SARS-COV-2 RDRP RESP QL NAA+PROBE: NEGATIVE
SODIUM SERPL-SCNC: 157 MMOL/L (ref 136–145)
SP GR UR STRIP: 1.02 (ref 1–1.03)
SQUAMOUS #/AREA URNS HPF: 2 /HPF
URN SPEC COLLECT METH UR: ABNORMAL
UROBILINOGEN UR STRIP-ACNC: NEGATIVE EU/DL
WBC # BLD AUTO: 13.95 K/UL (ref 3.9–12.7)
WBC #/AREA URNS HPF: 25 /HPF (ref 0–5)

## 2020-11-13 PROCEDURE — 25000003 PHARM REV CODE 250: Performed by: INTERNAL MEDICINE

## 2020-11-13 PROCEDURE — 87086 URINE CULTURE/COLONY COUNT: CPT

## 2020-11-13 PROCEDURE — 83880 ASSAY OF NATRIURETIC PEPTIDE: CPT

## 2020-11-13 PROCEDURE — 25000003 PHARM REV CODE 250: Performed by: EMERGENCY MEDICINE

## 2020-11-13 PROCEDURE — 25000003 PHARM REV CODE 250: Performed by: NURSE PRACTITIONER

## 2020-11-13 PROCEDURE — 84133 ASSAY OF URINE POTASSIUM: CPT

## 2020-11-13 PROCEDURE — 81000 URINALYSIS NONAUTO W/SCOPE: CPT

## 2020-11-13 PROCEDURE — 80053 COMPREHEN METABOLIC PANEL: CPT

## 2020-11-13 PROCEDURE — 96360 HYDRATION IV INFUSION INIT: CPT

## 2020-11-13 PROCEDURE — 63600175 PHARM REV CODE 636 W HCPCS: Performed by: NURSE PRACTITIONER

## 2020-11-13 PROCEDURE — 87186 SC STD MICRODIL/AGAR DIL: CPT

## 2020-11-13 PROCEDURE — 85025 COMPLETE CBC W/AUTO DIFF WBC: CPT

## 2020-11-13 PROCEDURE — 87077 CULTURE AEROBIC IDENTIFY: CPT

## 2020-11-13 PROCEDURE — 99900035 HC TECH TIME PER 15 MIN (STAT)

## 2020-11-13 PROCEDURE — S5010 5% DEXTROSE AND 0.45% SALINE: HCPCS | Performed by: NURSE PRACTITIONER

## 2020-11-13 PROCEDURE — U0002 COVID-19 LAB TEST NON-CDC: HCPCS

## 2020-11-13 PROCEDURE — 36415 COLL VENOUS BLD VENIPUNCTURE: CPT

## 2020-11-13 PROCEDURE — 87040 BLOOD CULTURE FOR BACTERIA: CPT

## 2020-11-13 PROCEDURE — 99291 CRITICAL CARE FIRST HOUR: CPT

## 2020-11-13 PROCEDURE — 63600175 PHARM REV CODE 636 W HCPCS: Performed by: EMERGENCY MEDICINE

## 2020-11-13 PROCEDURE — 80202 ASSAY OF VANCOMYCIN: CPT

## 2020-11-13 PROCEDURE — 21400001 HC TELEMETRY ROOM

## 2020-11-13 RX ORDER — DEXTROSE MONOHYDRATE AND SODIUM CHLORIDE 5; .45 G/100ML; G/100ML
INJECTION, SOLUTION INTRAVENOUS CONTINUOUS
Status: DISCONTINUED | OUTPATIENT
Start: 2020-11-13 | End: 2020-11-14

## 2020-11-13 RX ORDER — PROPRANOLOL HYDROCHLORIDE 20 MG/1
20 TABLET ORAL 4 TIMES DAILY
Status: DISCONTINUED | OUTPATIENT
Start: 2020-11-13 | End: 2020-11-14 | Stop reason: HOSPADM

## 2020-11-13 RX ORDER — POTASSIUM CHLORIDE 7.45 MG/ML
10 INJECTION INTRAVENOUS
Status: DISCONTINUED | OUTPATIENT
Start: 2020-11-13 | End: 2020-11-13

## 2020-11-13 RX ORDER — BISACODYL 10 MG
10 SUPPOSITORY, RECTAL RECTAL DAILY PRN
Status: DISCONTINUED | OUTPATIENT
Start: 2020-11-13 | End: 2020-11-14 | Stop reason: HOSPADM

## 2020-11-13 RX ORDER — PHENOBARBITAL 20 MG/5ML
100 ELIXIR ORAL NIGHTLY
Status: DISCONTINUED | OUTPATIENT
Start: 2020-11-13 | End: 2020-11-14 | Stop reason: HOSPADM

## 2020-11-13 RX ORDER — POTASSIUM CHLORIDE 7.45 MG/ML
10 INJECTION INTRAVENOUS
Status: COMPLETED | OUTPATIENT
Start: 2020-11-13 | End: 2020-11-13

## 2020-11-13 RX ORDER — CEFEPIME HYDROCHLORIDE 1 G/50ML
2 INJECTION, SOLUTION INTRAVENOUS
Status: DISCONTINUED | OUTPATIENT
Start: 2020-11-13 | End: 2020-11-14 | Stop reason: HOSPADM

## 2020-11-13 RX ORDER — POTASSIUM CHLORIDE 1.5 G/1.58G
POWDER, FOR SOLUTION ORAL
Status: ON HOLD | COMMUNITY
Start: 2020-08-23 | End: 2020-12-22 | Stop reason: HOSPADM

## 2020-11-13 RX ORDER — TALC
6 POWDER (GRAM) TOPICAL NIGHTLY PRN
Status: DISCONTINUED | OUTPATIENT
Start: 2020-11-13 | End: 2020-11-14 | Stop reason: HOSPADM

## 2020-11-13 RX ORDER — GABAPENTIN 250 MG/5ML
250 SOLUTION ORAL NIGHTLY
Status: DISCONTINUED | OUTPATIENT
Start: 2020-11-13 | End: 2020-11-14 | Stop reason: HOSPADM

## 2020-11-13 RX ORDER — SODIUM CHLORIDE AND POTASSIUM CHLORIDE 150; 900 MG/100ML; MG/100ML
1000 INJECTION, SOLUTION INTRAVENOUS CONTINUOUS
Status: DISCONTINUED | OUTPATIENT
Start: 2020-11-13 | End: 2020-11-14

## 2020-11-13 RX ORDER — MUPIROCIN 20 MG/G
OINTMENT TOPICAL 2 TIMES DAILY
Status: DISCONTINUED | OUTPATIENT
Start: 2020-11-13 | End: 2020-11-14 | Stop reason: HOSPADM

## 2020-11-13 RX ORDER — ASCORBIC ACID 500 MG
500 TABLET ORAL DAILY
Status: DISCONTINUED | OUTPATIENT
Start: 2020-11-14 | End: 2020-11-14 | Stop reason: HOSPADM

## 2020-11-13 RX ORDER — POTASSIUM CHLORIDE 1.5 G/1.58G
40 POWDER, FOR SOLUTION ORAL
Status: COMPLETED | OUTPATIENT
Start: 2020-11-13 | End: 2020-11-14

## 2020-11-13 RX ORDER — BACLOFEN 10 MG/1
10 TABLET ORAL 3 TIMES DAILY
Status: DISCONTINUED | OUTPATIENT
Start: 2020-11-13 | End: 2020-11-14 | Stop reason: HOSPADM

## 2020-11-13 RX ORDER — L. ACIDOPHILUS/L.BULGARICUS 100MM CELL
1 GRANULES IN PACKET (EA) ORAL DAILY
Status: DISCONTINUED | OUTPATIENT
Start: 2020-11-14 | End: 2020-11-14 | Stop reason: HOSPADM

## 2020-11-13 RX ORDER — METRONIDAZOLE 500 MG/1
500 TABLET ORAL EVERY 8 HOURS
Status: DISCONTINUED | OUTPATIENT
Start: 2020-11-13 | End: 2020-11-14 | Stop reason: HOSPADM

## 2020-11-13 RX ORDER — POTASSIUM CHLORIDE 1.5 G/1.58G
40 POWDER, FOR SOLUTION ORAL DAILY
Status: DISCONTINUED | OUTPATIENT
Start: 2020-11-14 | End: 2020-11-13

## 2020-11-13 RX ADMIN — MUPIROCIN: 20 OINTMENT TOPICAL at 11:11

## 2020-11-13 RX ADMIN — CEFEPIME HYDROCHLORIDE 2 G: 2 INJECTION, SOLUTION INTRAVENOUS at 11:11

## 2020-11-13 RX ADMIN — SODIUM CHLORIDE AND POTASSIUM CHLORIDE 1000 ML: .9; .15 SOLUTION INTRAVENOUS at 07:11

## 2020-11-13 RX ADMIN — METRONIDAZOLE 500 MG: 500 TABLET ORAL at 11:11

## 2020-11-13 RX ADMIN — PHENOBARBITAL 100 MG: 20 ELIXIR ORAL at 11:11

## 2020-11-13 RX ADMIN — MICONAZOLE NITRATE: 2 OINTMENT TOPICAL at 11:11

## 2020-11-13 RX ADMIN — DEXTROSE AND SODIUM CHLORIDE: 5; .45 INJECTION, SOLUTION INTRAVENOUS at 11:11

## 2020-11-13 RX ADMIN — BACLOFEN 10 MG: 10 TABLET ORAL at 11:11

## 2020-11-13 RX ADMIN — GABAPENTIN 250 MG: 250 SUSPENSION ORAL at 11:11

## 2020-11-13 RX ADMIN — SODIUM CHLORIDE 1000 ML: 0.9 INJECTION, SOLUTION INTRAVENOUS at 01:11

## 2020-11-13 RX ADMIN — PROPRANOLOL HYDROCHLORIDE 20 MG: 20 TABLET ORAL at 11:11

## 2020-11-13 RX ADMIN — POTASSIUM CHLORIDE 10 MEQ: 7.46 INJECTION, SOLUTION INTRAVENOUS at 06:11

## 2020-11-13 RX ADMIN — POTASSIUM CHLORIDE 40 MEQ: 1.5 POWDER, FOR SOLUTION ORAL at 11:11

## 2020-11-13 NOTE — CONSULTS
Consulted on this 24 y/o M patient due to present on admission colostomy and wounds. Well known to wound care service. Patient admitted with concerns for bowel obstruction from [facility]. Patient is awake and alert, O2 via trach collar. PMH significant for spastic cerebral palsy, severe contractures, seizure disorder, malnutrition. Skin assessment completed with CARTER Anthony RN wound care.  Left upper ear fold stage 2 pressure injury with intact serous fluid filled blister, nonblanchable redness noted. painted with cavilon and left SHAWANDA. Right upper ear fold intact.  PEG tube site intact.  Right hand contracture noted, nonblanchable redness noted to lateral hand proximal to thumb, and scabs to lateral hand proximal to 5th finger.  Colostomy noted. Stoma is edematous, large loop colostomy, moist and red with small area of mucocutaneous separation noted from 3-6 o'clock. Cleansed with water and patted dry. Nayely stomal skin prepped with cavilon and ostomy ring applied. One piece surgical pouch cut to size and applied, pressing to seal.     Turned with max assistance to left side.   Sacrum intact. Wound vac dressing to bilateral ischiums removed as not hooked up to machine and not compatible with KCI wound vac.  Right Ischium stage 4 pressure injury noted. Wound measurements 6v1m8ha with 3cm undermining extending from 9-1 o'clock. Wound bed is moist, red granulation tissue, bone remains palpable. Cleansed with wound cleanser spray and patted dry. Nayely wound skin sprayed with cavilon, ostomy ring applied to line edges. One piece black foam cut to size and applied to fill wound, and sealed with drape, patient then repositioned to right side semi-prone.  Left ischium stage 4 pressure injury noted.  Wound measurements [id number] with 3cm undermining extending from 9-3 o'clock. Wound bed is moist, red granulation tissue, bone remains palpable. Cleansed with wound cleanser spray and patted dry. Nayely wound skin sprayed with  "cavilon,edges lined with duoderm. Two pieces black foam cut to size and applied to fill wound, and sealed with drape, patient then repositioned tilted to right side with pillow, extremities supported. Both wound VAC dressings connected via Y-connector to I wound vac ulta at same settings: continuous -125 mmHg low intensity suction with good seal noted, no air leak.    Stage 3 pressure injury to scrotum noted that is likely related to tubing from prior wound vac dressing. Measures 4x2x0.2cm, moist yellow wound bed, scant slough. Cleansed with saline and thick layer critic aid paste moisture barrier applied.  Healing stage 2 pressure injury noted to right medial thigh in linear form liekly related to wound vac tubing, dry, red, scabbed. Critic aid paste moisture barrier applied.     Wound care recommendations:  Will order specialty GRAY IMMERSE bed  Position extremities with pillows, turn with pillows for comfort. Turn q2 hours.    Stage 4 pressure injury to bilateral ischiums:   Wound vac dressing change every MWF    Stage 3 pressure injury to scrotum, stage 2 PI to right medial thigh:  1. Cleanse with saline  2. Pat dry  3. Apply critic aid paste moisture barrier BID and prn  4. Support scrotum with rolled washcloth    Stage 2 pressure injury to left proximal ear:  1. Cleanse with saline  2. Pat dry  3. Paint with cavilon every shift    Please review Dedra's procedure "Pouching : Colostomy or Ileostomy" for instructions on ostomy.stoma care. Change ostomy appliance weekly or IMMEDIATELY IF LEAKING. All ostomy care supplies can be requested from materials management.    OSTOMY CARE SUPPLIES:  Surgical coloplast ostomy pouch with window  Brava Stoma Ring #[id number]   Anitha Ostomy Paste #[id number]                      "

## 2020-11-13 NOTE — ED NOTES
"Spoke with Cuba SIEGEL, with [facility] who stated "I believe it is a tunneled central line that he has but he does use it for his IV antbiotics and potassium."   "

## 2020-11-13 NOTE — ED PROVIDER NOTES
"SCRIBE #1 NOTE: IIngrid, am scribing for, and in the presence of, Christiano Tineo MD. I have scribed the HPI, ROS, and PEx.     SCRIBE #2 NOTE: ILaurie, am scribing for, and in the presence of,  Rocco Waddell MD. I have scribed the remaining portions of the note not scribed by Scribe #1.      History     Chief Complaint   Patient presents with    GI Problem     sent from [place] for "colostomy problem"; also reported potassium of 2     Review of patient's allergies indicates:   Allergen Reactions    Morphine sulfate Hives         History of Present Illness     HPI    11/13/2020, 11:49 AM  History obtained from EMS.  History limited-patient nonverbal.      History of Present Illness: Glen Moscoso is a 25 y.o. male patient with a PMHx of cerebral palsy who presents to the Emergency Department for evaluation of colostomy problem. He is coming from [facility]. Staff also reported a potassium of 2. History limited-patient nonverbal.       Arrival mode: EMS    PCP: Yadi Lawson MD        Past Medical History:  Past Medical History:   Diagnosis Date    Acid reflux     Anemia of chronic disease 8/30/2020    Cerebral palsy     Colitis     Decubitus ulcer of ischial area, left, stage IV 9/10/2020    History of hip surgery     Osteomyelitis of pelvis 9/24/2020    Pneumoperitoneum 9/21/2020    Pressure injury of right ischium, stage 4     S/P percutaneous endoscopic gastrostomy (PEG) tube placement     Seizure disorder 8/26/2020    Seizures     Severe protein-calorie malnutrition     Sinus tachycardia 8/4/2020       Past Surgical History:  Past Surgical History:   Procedure Laterality Date    CHOLECYSTECTOMY      FLEXIBLE SIGMOIDOSCOPY N/A 9/9/2020    Procedure: SIGMOIDOSCOPY, FLEXIBLE;  Surgeon: America Goode MD;  Location: [address]);  Service: Endoscopy;  Laterality: N/A;    HIP SURGERY      TRACHEOSTOMY N/A 10/27/2020    Procedure: CREATION, " TRACHEOSTOMY;  Surgeon: Kar Palma MD;  Location: [facility] OR;  Service: ENT;  Laterality: N/A;         Family History:  Family History   Problem Relation Age of Onset    Cancer Maternal Grandfather        Social History:  Social History     Tobacco Use    Smoking status: Never Smoker    Smokeless tobacco: Never Used   Substance and Sexual Activity    Alcohol use: Never     Frequency: Never    Drug use: Not Currently    Sexual activity: Unknown        Review of Systems     Review of Systems   Unable to perform ROS: Patient nonverbal      Physical Exam     Initial Vitals   BP Pulse Resp Temp SpO2   11/13/20 1144 11/13/20 1144 11/13/20 1144 11/13/20 1156 11/13/20 1144   136/70 (!) 111 20 98.2 °F (36.8 °C) 100 %      MAP       --                 Physical Exam  Nursing Notes and Vital Signs Reviewed.  Constitutional: Patient is in no acute distress. Ill-appearing.  Head: Atraumatic. Normocephalic.  Eyes: PERRL. EOM intact. Conjunctivae are not pale. No scleral icterus.  ENT: Mucous membranes are moist. Oropharynx is clear and symmetric.    Neck: Supple. Full ROM. No lymphadenopathy.  Cardiovascular: Tachycardic. Regular rhythm. No murmurs, rubs, or gallops. Distal pulses are 2+ and symmetric.  Pulmonary/Chest: No respiratory distress. Clear to auscultation bilaterally. No wheezing or rales.  Abdominal: Soft and non-distended.  There is no tenderness.  No rebound, guarding, or rigidity. Good bowel sounds. Ostomy to abdominal wall. Peg tube in place.   Genitourinary: No CVA tenderness  Musculoskeletal: Chronically contracted. No obvious deformities. No edema. No calf tenderness.  Skin: Warm and dry.  Neurological:  Alert, awake, and appropriate. Pt is nonverbal. No acute focal neurological deficits are appreciated.       ED Course   Critical Care    Date/Time: 11/13/2020 5:54 PM  Performed by: Rocco Waddell MD  Authorized by: Rocco Waddell MD   Direct patient critical care time: 15  minutes  Additional history critical care time: 12 minutes  Ordering / reviewing critical care time: 10 minutes  Documentation critical care time: 8 minutes  Consulting other physicians critical care time: 5 minutes  Total critical care time (exclusive of procedural time) : 50 minutes  Critical care time was exclusive of separately billable procedures and treating other patients and teaching time.  Critical care was necessary to treat or prevent imminent or life-threatening deterioration of the following conditions: hypokalemia.  Critical care was time spent personally by me on the following activities: blood draw for specimens, discussions with consultants, development of treatment plan with patient or surrogate, interpretation of cardiac output measurements, evaluation of patient's response to treatment, examination of patient, obtaining history from patient or surrogate, ordering and performing treatments and interventions, ordering and review of laboratory studies, ordering and review of radiographic studies, pulse oximetry, re-evaluation of patient's condition and review of old charts.        ED Vital Signs:  Vitals:    11/13/20 1144 11/13/20 1156 11/13/20 1231 11/13/20 1302   BP: 136/70  135/83 139/89   Pulse: (!) 111 103 102 99   Resp: 20 16     Temp:  98.2 °F (36.8 °C)     TempSrc:  Axillary     SpO2: 100% 100% 100% 100%   Weight:        11/13/20 1314 11/13/20 1402 11/13/20 1502 11/13/20 1719   BP:  127/84 (!) 142/95    Pulse:  110 92 97   Resp:  18 20    Temp:   98.4 °F (36.9 °C)    TempSrc:   Axillary    SpO2:  100% 100% 100%   Weight: 38.8 kg (85 lb 8.6 oz)          Abnormal Lab Results:  Labs Reviewed   COMPREHENSIVE METABOLIC PANEL - Abnormal; Notable for the following components:       Result Value    Sodium 157 (*)     Potassium <2.0 (*)     Chloride 128 (*)     CO2 14 (*)     Calcium 8.5 (*)     Albumin 1.9 (*)     ALT 7 (*)     All other components within normal limits    Narrative:     K, CL  critical result(s) called and verbal readback obtained from   Zhanna Whitney RN by TRESA 11/13/2020 17:23   CBC W/ AUTO DIFFERENTIAL - Abnormal; Notable for the following components:    WBC 13.95 (*)     RBC 3.47 (*)     Hemoglobin 9.8 (*)     Hematocrit 32.7 (*)     MCHC 30.0 (*)     RDW 17.0 (*)     Platelets 653 (*)     Immature Granulocytes 0.9 (*)     Gran # (ANC) 10.9 (*)     Immature Grans (Abs) 0.12 (*)     Mono # 1.4 (*)     Gran % 78.1 (*)     Lymph % 10.9 (*)     All other components within normal limits   URINALYSIS, REFLEX TO URINE CULTURE - Abnormal; Notable for the following components:    Protein, UA 2+ (*)     Ketones, UA 1+ (*)     Occult Blood UA 3+ (*)     Leukocytes, UA 3+ (*)     All other components within normal limits    Narrative:     Specimen Source->Urine   URINALYSIS MICROSCOPIC - Abnormal; Notable for the following components:    RBC, UA 80 (*)     WBC, UA 25 (*)     All other components within normal limits    Narrative:     Specimen Source->Urine   CULTURE, URINE   SARS-COV-2 RNA AMPLIFICATION, QUAL   POTASSIUM, URINE, RANDOM        All Lab Results:  Results for orders placed or performed during the hospital encounter of 11/13/20   Comprehensive Metabolic Panel   Result Value Ref Range    Sodium 157 (H) 136 - 145 mmol/L    Potassium <2.0 (LL) 3.5 - 5.1 mmol/L    Chloride 128 (HH) 95 - 110 mmol/L    CO2 14 (L) 23 - 29 mmol/L    Glucose 71 70 - 110 mg/dL    BUN 18 6 - 20 mg/dL    Creatinine 0.7 0.5 - 1.4 mg/dL    Calcium 8.5 (L) 8.7 - 10.5 mg/dL    Total Protein 6.5 6.0 - 8.4 g/dL    Albumin 1.9 (L) 3.5 - 5.2 g/dL    Total Bilirubin 0.1 0.1 - 1.0 mg/dL    Alkaline Phosphatase 99 55 - 135 U/L    AST 16 10 - 40 U/L    ALT 7 (L) 10 - 44 U/L    Anion Gap 15 8 - 16 mmol/L    eGFR if African American >60 >60 mL/min/1.73 m^2    eGFR if non African American >60 >60 mL/min/1.73 m^2   CBC Auto Differential   Result Value Ref Range    WBC 13.95 (H) 3.90 - 12.70 K/uL    RBC 3.47 (L) 4.60 - 6.20 M/uL     Hemoglobin 9.8 (L) 14.0 - 18.0 g/dL    Hematocrit 32.7 (L) 40.0 - 54.0 %    MCV 94 82 - 98 fL    MCH 28.2 27.0 - 31.0 pg    MCHC 30.0 (L) 32.0 - 36.0 g/dL    RDW 17.0 (H) 11.5 - 14.5 %    Platelets 653 (H) 150 - 350 K/uL    MPV 9.6 9.2 - 12.9 fL    Immature Granulocytes 0.9 (H) 0.0 - 0.5 %    Gran # (ANC) 10.9 (H) 1.8 - 7.7 K/uL    Immature Grans (Abs) 0.12 (H) 0.00 - 0.04 K/uL    Lymph # 1.5 1.0 - 4.8 K/uL    Mono # 1.4 (H) 0.3 - 1.0 K/uL    Eos # 0.0 0.0 - 0.5 K/uL    Baso # 0.01 0.00 - 0.20 K/uL    nRBC 0 0 /100 WBC    Gran % 78.1 (H) 38.0 - 73.0 %    Lymph % 10.9 (L) 18.0 - 48.0 %    Mono % 10.0 4.0 - 15.0 %    Eosinophil % 0.0 0.0 - 8.0 %    Basophil % 0.1 0.0 - 1.9 %    Differential Method Automated    Urinalysis, Reflex to Urine Culture Urine, Clean Catch    Specimen: Urine   Result Value Ref Range    Specimen UA Urine, Catheterized     Color, UA Yellow Yellow, Straw, Vane    Appearance, UA Clear Clear    pH, UA 6.0 5.0 - 8.0    Specific Gravity, UA 1.020 1.005 - 1.030    Protein, UA 2+ (A) Negative    Glucose, UA Negative Negative    Ketones, UA 1+ (A) Negative    Bilirubin (UA) Negative Negative    Occult Blood UA 3+ (A) Negative    Nitrite, UA Negative Negative    Urobilinogen, UA Negative <2.0 EU/dL    Leukocytes, UA 3+ (A) Negative   Urinalysis Microscopic   Result Value Ref Range    RBC, UA 80 (H) 0 - 4 /hpf    WBC, UA 25 (H) 0 - 5 /hpf    Bacteria Rare None-Occ /hpf    Squam Epithel, UA 2 /hpf    Hyaline Casts, UA 0 0-1/lpf /lpf    Microscopic Comment SEE COMMENT    COVID-19 Rapid Screening   Result Value Ref Range    SARS-CoV-2 RNA, Amplification, Qual Negative Negative         Imaging Results:  Imaging Results          CT Abdomen Pelvis  Without Contrast (Final result)  Result time 11/13/20 17:59:27    Final result by Cherie Guadarrama MD (11/13/20 17:59:27)                 Impression:      Diffusely enlarged colon.  Correlate clinically to Hirschsprung.    Remaining of the findings as  above    Questionable hip dysplasia.  Recommend clinical correlation      Electronically signed by: Thierry Crespo  Date:    11/13/2020  Time:    17:59             Narrative:    EXAMINATION:  CT ABDOMEN PELVIS WITHOUT CONTRAST    CLINICAL HISTORY:  Abdominal distension;    TECHNIQUE:  Low dose axial images, sagittal and coronal reformations were obtained from the lung bases to the pubic symphysis.  Oral contrast was not administered.    COMPARISON:  None    FINDINGS:  Suboptimal imaging due to lack of contrast.  Iglesias catheter in place mild basilar atelectasis with trace pleural effusion.  Massively enlarged: Predominantly involving the cecum where it measures 12 cm.  Left colon and sigmoid are also dilated.  The small bowel appears decompressed.  Left hepatic cystic lesion measures 6 mm.  Minimal renal pelviectasis.  Spleen adrenal glands pancreas are grossly unremarkable.   G-tube is identified.  Questionable hip dysplasia recommend clinical correlation.                               X-Ray Abdomen AP with Left Decub (Final result)  Result time 11/13/20 13:18:54   Procedure changed from X-Ray Abdomen Flat And Erect     Final result by Barney Acuna III, MD (11/13/20 13:18:54)                 Impression:      Stable colonic dilation with air-fluid levels, possibly adynamic ileus or toxic megacolon.      Electronically signed by: Barney Acuna MD  Date:    11/13/2020  Time:    13:18             Narrative:    EXAMINATION:  XR ABDOMEN AP WITH LEFT DECUB    CLINICAL HISTORY:  Abdominal pain; Nausea    COMPARISON:  11/01/2020    FINDINGS:  There is persistent fairly diffuse colonic dilation with air-fluid levels which does not appear significantly changed since prior exam.  There may be a few loops of dilated small bowel.  No radiographic evidence of free air.  Sacral decubitus ulcers with underlying osteomyelitis is again noted.                                      The Emergency Provider reviewed the vital signs and  test results, which are outlined above.     ED Discussion     4:00 PM: Dr. Tineo transfers care of patient to Dr. Waddell pending lab results.    5:11 PM: Dr. Waddell discussed pt's case with Dr. Rao (General Surgery) who recommends CT abdomen pelvis with contrast.    5:52 PM: Discussed case with Sharita Nascimento NP ([facility]). Dr. Cool agrees with current care and management of pt and accepts admission.   Admitting Service: Hospital Medicine  Admitting Physician: Dr. Cool  Admit to: IP, tele    6:23 PM: Updated pt's family about plan to admit. I have discussed test results, shared treatment plan, and the need for admission with family. Family expresses understanding at this time and agree with all information. All questions answered. Family has no further questions or concerns at this time. Pt is ready for admit.       Medical Decision Making:   Clinical Tests:   Lab Tests: Ordered and Reviewed  Radiological Study: Ordered and Reviewed           ED Medication(s):  Medications   potassium chloride 10 mEq in 100 mL IVPB (10 mEq Intravenous New Bag 11/13/20 1814)   0.9 % NaCl with KCl 20 mEq infusion (has no administration in time range)   sodium chloride 0.9% bolus 1,000 mL (0 mLs Intravenous Stopped 11/13/20 1441)       New Prescriptions    No medications on file               Scribe Attestation:   Scribe #1: I performed the above scribed service and the documentation accurately describes the services I performed. I attest to the accuracy of the note.     Attending:   Physician Attestation Statement for Scribe #1: I, Christiano Tineo MD, personally performed the services described in this documentation, as scribed by Ingrid Gardner, in my presence, and it is both accurate and complete.       Scribe Attestation:   Scribe #2: I performed the above scribed service and the documentation accurately describes the services I performed. I attest to the accuracy of the note.    Attending Attestation:            Physician Attestation for Scribe:    Physician Attestation Statement for Scribe #2: I, Rocco Waddell MD, reviewed documentation, as scribed by Laurie Barrera in my presence, and it is both accurate and complete. I also acknowledge and confirm the content of the note done by Scribe #1.           Clinical Impression       ICD-10-CM ICD-9-CM   1. Hypokalemia  E87.6 276.8   2. Nausea  R11.0 787.02   3. Hypernatremia  E87.0 276.0   4. Encounter for attention to colostomy  Z43.3 V55.3       Disposition:   Disposition: Admitted  Condition: Fair         Rocco Waddell MD  11/13/20 1938

## 2020-11-13 NOTE — ED NOTES
Peg tube noted to abd with a syringe taped into port without plunger. Gastric contents leaking from syringe.   Wound Vac noted to sacral area that is not connected to suction.   Stoma is very swollen and red, plastic piece from colostomy bag was stuck in stoma. Removed flanged and placed a wet dressing on top. Wound care contacted to see patient.

## 2020-11-14 VITALS
OXYGEN SATURATION: 100 % | DIASTOLIC BLOOD PRESSURE: 71 MMHG | WEIGHT: 91.5 LBS | RESPIRATION RATE: 18 BRPM | SYSTOLIC BLOOD PRESSURE: 108 MMHG | BODY MASS INDEX: 17.96 KG/M2 | HEART RATE: 44 BPM | TEMPERATURE: 97 F | HEIGHT: 60 IN

## 2020-11-14 PROBLEM — K94.03 COLOSTOMY MALFUNCTION: Status: RESOLVED | Noted: 2020-11-13 | Resolved: 2020-11-14

## 2020-11-14 LAB
ANION GAP SERPL CALC-SCNC: ABNORMAL MMOL/L (ref 8–16)
BASOPHILS # BLD AUTO: 0.09 K/UL (ref 0–0.2)
BASOPHILS NFR BLD: 0.5 % (ref 0–1.9)
BNP SERPL-MCNC: 106 PG/ML (ref 0–99)
BNP SERPL-MCNC: 111 PG/ML (ref 0–99)
BNP SERPL-MCNC: 115 PG/ML (ref 0–99)
BNP SERPL-MCNC: 119 PG/ML (ref 0–99)
BNP SERPL-MCNC: 122 PG/ML (ref 0–99)
BUN SERPL-MCNC: 16 MG/DL (ref 6–20)
CALCIUM SERPL-MCNC: 8.3 MG/DL (ref 8.7–10.5)
CHLORIDE SERPL-SCNC: >130 MMOL/L (ref 95–110)
CO2 SERPL-SCNC: 13 MMOL/L (ref 23–29)
CREAT SERPL-MCNC: 0.6 MG/DL (ref 0.5–1.4)
DIFFERENTIAL METHOD: ABNORMAL
EOSINOPHIL # BLD AUTO: 0 K/UL (ref 0–0.5)
EOSINOPHIL NFR BLD: 0.2 % (ref 0–8)
ERYTHROCYTE [DISTWIDTH] IN BLOOD BY AUTOMATED COUNT: 17.2 % (ref 11.5–14.5)
EST. GFR  (AFRICAN AMERICAN): >60 ML/MIN/1.73 M^2
EST. GFR  (NON AFRICAN AMERICAN): >60 ML/MIN/1.73 M^2
GLUCOSE SERPL-MCNC: 88 MG/DL (ref 70–110)
HCT VFR BLD AUTO: 31.6 % (ref 40–54)
HGB BLD-MCNC: 9.6 G/DL (ref 14–18)
IMM GRANULOCYTES # BLD AUTO: 0.61 K/UL (ref 0–0.04)
IMM GRANULOCYTES NFR BLD AUTO: 3.5 % (ref 0–0.5)
LYMPHOCYTES # BLD AUTO: 2 K/UL (ref 1–4.8)
LYMPHOCYTES NFR BLD: 11.5 % (ref 18–48)
MAGNESIUM SERPL-MCNC: 2.3 MG/DL (ref 1.6–2.6)
MCH RBC QN AUTO: 28.2 PG (ref 27–31)
MCHC RBC AUTO-ENTMCNC: 30.4 G/DL (ref 32–36)
MCV RBC AUTO: 93 FL (ref 82–98)
MONOCYTES # BLD AUTO: 2.2 K/UL (ref 0.3–1)
MONOCYTES NFR BLD: 12.3 % (ref 4–15)
NEUTROPHILS # BLD AUTO: 12.6 K/UL (ref 1.8–7.7)
NEUTROPHILS NFR BLD: 72 % (ref 38–73)
NRBC BLD-RTO: 0 /100 WBC
PLATELET # BLD AUTO: 637 K/UL (ref 150–350)
PMV BLD AUTO: 10.7 FL (ref 9.2–12.9)
POTASSIUM SERPL-SCNC: 4.1 MMOL/L (ref 3.5–5.1)
RBC # BLD AUTO: 3.41 M/UL (ref 4.6–6.2)
SODIUM SERPL-SCNC: 159 MMOL/L (ref 136–145)
VANCOMYCIN SERPL-MCNC: 11.7 UG/ML
VANCOMYCIN SERPL-MCNC: 19.4 UG/ML
VANCOMYCIN TROUGH SERPL-MCNC: 25.5 UG/ML (ref 10–22)
WBC # BLD AUTO: 17.5 K/UL (ref 3.9–12.7)

## 2020-11-14 PROCEDURE — 83735 ASSAY OF MAGNESIUM: CPT

## 2020-11-14 PROCEDURE — 94761 N-INVAS EAR/PLS OXIMETRY MLT: CPT

## 2020-11-14 PROCEDURE — 25000003 PHARM REV CODE 250: Performed by: NURSE PRACTITIONER

## 2020-11-14 PROCEDURE — 83880 ASSAY OF NATRIURETIC PEPTIDE: CPT

## 2020-11-14 PROCEDURE — 25000003 PHARM REV CODE 250: Performed by: INTERNAL MEDICINE

## 2020-11-14 PROCEDURE — 63600175 PHARM REV CODE 636 W HCPCS: Performed by: NURSE PRACTITIONER

## 2020-11-14 PROCEDURE — 36415 COLL VENOUS BLD VENIPUNCTURE: CPT

## 2020-11-14 PROCEDURE — 27000221 HC OXYGEN, UP TO 24 HOURS

## 2020-11-14 PROCEDURE — 80202 ASSAY OF VANCOMYCIN: CPT | Mod: 91

## 2020-11-14 PROCEDURE — 99232 PR SUBSEQUENT HOSPITAL CARE,LEVL II: ICD-10-PCS | Mod: ,,, | Performed by: SURGERY

## 2020-11-14 PROCEDURE — 99232 SBSQ HOSP IP/OBS MODERATE 35: CPT | Mod: ,,, | Performed by: SURGERY

## 2020-11-14 PROCEDURE — 80048 BASIC METABOLIC PNL TOTAL CA: CPT

## 2020-11-14 PROCEDURE — 99900035 HC TECH TIME PER 15 MIN (STAT)

## 2020-11-14 PROCEDURE — 80202 ASSAY OF VANCOMYCIN: CPT

## 2020-11-14 PROCEDURE — 85025 COMPLETE CBC W/AUTO DIFF WBC: CPT

## 2020-11-14 PROCEDURE — 31720 CLEARANCE OF AIRWAYS: CPT

## 2020-11-14 RX ORDER — METRONIDAZOLE 500 MG/1
500 TABLET ORAL EVERY 8 HOURS
Qty: 30 TABLET | Refills: 0
Start: 2020-11-14 | End: 2020-11-24

## 2020-11-14 RX ORDER — DEXTROSE MONOHYDRATE 50 MG/ML
INJECTION, SOLUTION INTRAVENOUS CONTINUOUS
Status: DISCONTINUED | OUTPATIENT
Start: 2020-11-14 | End: 2020-11-14 | Stop reason: HOSPADM

## 2020-11-14 RX ORDER — POTASSIUM CHLORIDE 29.8 MG/ML
40 INJECTION INTRAVENOUS ONCE
Status: COMPLETED | OUTPATIENT
Start: 2020-11-14 | End: 2020-11-14

## 2020-11-14 RX ADMIN — PROPRANOLOL HYDROCHLORIDE 20 MG: 20 TABLET ORAL at 09:11

## 2020-11-14 RX ADMIN — PROPRANOLOL HYDROCHLORIDE 20 MG: 20 TABLET ORAL at 02:11

## 2020-11-14 RX ADMIN — POTASSIUM CHLORIDE 40 MEQ: 1.5 POWDER, FOR SOLUTION ORAL at 12:11

## 2020-11-14 RX ADMIN — CEFEPIME HYDROCHLORIDE 2 G: 2 INJECTION, SOLUTION INTRAVENOUS at 04:11

## 2020-11-14 RX ADMIN — OXYCODONE HYDROCHLORIDE AND ACETAMINOPHEN 500 MG: 500 TABLET ORAL at 09:11

## 2020-11-14 RX ADMIN — BACLOFEN 10 MG: 10 TABLET ORAL at 02:11

## 2020-11-14 RX ADMIN — POTASSIUM CHLORIDE 40 MEQ: 29.8 INJECTION, SOLUTION INTRAVENOUS at 01:11

## 2020-11-14 RX ADMIN — VANCOMYCIN HYDROCHLORIDE 750 MG: 750 INJECTION, POWDER, LYOPHILIZED, FOR SOLUTION INTRAVENOUS at 12:11

## 2020-11-14 RX ADMIN — LACTOBACILLUS ACIDOPHILUS / LACTOBACILLUS BULGARICUS 1 EACH: 100 MILLION CFU STRENGTH GRANULES at 09:11

## 2020-11-14 RX ADMIN — MULTIVITAMIN 15 ML: LIQUID ORAL at 09:11

## 2020-11-14 RX ADMIN — MUPIROCIN: 20 OINTMENT TOPICAL at 09:11

## 2020-11-14 RX ADMIN — MICONAZOLE NITRATE: 2 OINTMENT TOPICAL at 09:11

## 2020-11-14 RX ADMIN — METRONIDAZOLE 500 MG: 500 TABLET ORAL at 05:11

## 2020-11-14 RX ADMIN — CEFEPIME HYDROCHLORIDE 2 G: 2 INJECTION, SOLUTION INTRAVENOUS at 09:11

## 2020-11-14 RX ADMIN — BACLOFEN 10 MG: 10 TABLET ORAL at 09:11

## 2020-11-14 RX ADMIN — METRONIDAZOLE 500 MG: 500 TABLET ORAL at 02:11

## 2020-11-14 RX ADMIN — POTASSIUM CHLORIDE 40 MEQ: 1.5 POWDER, FOR SOLUTION ORAL at 01:11

## 2020-11-14 RX ADMIN — DEXTROSE: 5 SOLUTION INTRAVENOUS at 05:11

## 2020-11-14 NOTE — ASSESSMENT & PLAN NOTE
No peg tube feeding for now in case of AM surgical intervention  Dietitian consulted  Continue home kota

## 2020-11-14 NOTE — ASSESSMENT & PLAN NOTE
Contributing Nutrition Diagnosis  Moderate chronic condition related malnutrition    Related to (etiology):   Inadequate protein/energy intake, increased needs d/t spastic cerebral palsy  , difficulty swallowing    Signs and Symptoms (as evidenced by):   1) 25% wt loss x 1 year ( 10/2019--10/2020)  2) multiple PIs  3) mild-moderate fat/muscle wasting ( 10/30/20)    Interventions/Recommendations (treatment strategy):  above    Nutrition Diagnosis Status:   improving

## 2020-11-14 NOTE — PROGRESS NOTES
#8 shiley trach secure; 28% trach collar in use; ambu at [facility]; extra trach @ [facility]; trach care done; inner cannula changed; trach site cleaned and gauze replaced; pt suctioned for moderate amt of thick yellow secretions.

## 2020-11-14 NOTE — H&P
"[facility] Hospital Medicine  History & Physical    Patient Name: Glen Moscoso  MRN: [id number]  Admission Date: 11/13/2020  Attending Physician: Melissa Cool MD   Primary Care Provider: Yadi Lawson MD         Patient information was obtained from nursing home, past medical records and ER records.     Subjective:     Principal Problem:Hypokalemia    Chief Complaint:   Chief Complaint   Patient presents with    GI Problem     sent from [place] for "colostomy problem"; also reported potassium of 2        HPI: 24 y/o WM with hx of seizures, colitis, acid reflux, severe PCM, Peg tub,e pelvic osteo, contractures, cerebral palsy, anemia, proctocolitis to ED from [facility] d/t concerns that his colostomy was not draining well and has been becoming more edematous over the past several day. Pt is non-verbal, limiting ROS. 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 Pt was given K+ and 1L NS and started on IV fluids with potassium; wound vac was applied to his bilateral ischial wounds. Hospital medicine was called and the pt was placed in observation on telemetry. Pt is a full code - surrogate decision maker is his mother, Leni Moscoso.       Past Medical History:   Diagnosis Date    Acid reflux     Anemia of chronic disease 8/30/2020    Cerebral palsy     Colitis     Decubitus ulcer of ischial area, left, stage IV 9/10/2020    History of hip surgery     Osteomyelitis of pelvis " 9/24/2020    Pneumoperitoneum 9/21/2020    Pressure injury of right ischium, stage 4     S/P percutaneous endoscopic gastrostomy (PEG) tube placement     Seizure disorder 8/26/2020    Seizures     Severe protein-calorie malnutrition     Sinus tachycardia 8/4/2020       Past Surgical History:   Procedure Laterality Date    CHOLECYSTECTOMY      FLEXIBLE SIGMOIDOSCOPY N/A 9/9/2020    Procedure: SIGMOIDOSCOPY, FLEXIBLE;  Surgeon: America Goode MD;  Location: [address]);  Service: Endoscopy;  Laterality: N/A;    HIP SURGERY      TRACHEOSTOMY N/A 10/27/2020    Procedure: CREATION, TRACHEOSTOMY;  Surgeon: Kar Palma MD;  Location: [facility] OR;  Service: ENT;  Laterality: N/A;       Review of patient's allergies indicates:   Allergen Reactions    Morphine sulfate Hives       No current facility-administered medications on file prior to encounter.      Current Outpatient Medications on File Prior to Encounter   Medication Sig    ascorbic acid, vitamin C, (VITAMIN C) 500 MG tablet 1 tablet (500 mg total) by Per G Tube route once daily.    baclofen (LIORESAL) 10 MG tablet 1 tablet (10 mg total) by Per G Tube route 3 (three) times daily.    cefepime in dextrose 5 % (MAXIPIME) 2 gram/50 mL PgBk Inject 50 mLs (2 g total) into the vein every 8 (eight) hours.    gabapentin (NEURONTIN) 250 mg/5 mL solution 5 mLs (250 mg total) by Per G Tube route nightly. At bedtime    lactobacillus acidophilus & bulgar (LACTINEX) 100 million cell packet Take 1 tablet by mouth once daily.    melatonin (MELATIN) 3 mg tablet 2 tablets (6 mg total) by Per G Tube route nightly as needed for Insomnia.    metroNIDAZOLE (FLAGYL) 500 MG tablet 1 tablet (500 mg total) by Per G Tube route every 8 (eight) hours. for 10 days    multivitamin liquid no.118 Liqd 10 mLs by Per G Tube route once daily.    PHENobarbitaL 20 mg/5 mL (4 mg/mL) Elix elixir 25 mLs (100 mg total) by Per G Tube route every evening.    potassium chloride  (KLOR-CON) 20 mEq Pack DISSOLVE 2 TABLETS AS DIRECTED AND DRINK DAILY    propranoloL (INDERAL) 20 MG tablet 1 tablet (20 mg total) by Per G Tube route 3 (three) times daily. (Patient taking differently: 20 mg by Per G Tube route 4 (four) times daily. )    vancomycin HCl (VANCOMYCIN 750 MG/250 ML D5W, READY TO MIX SYSTEM,) Inject 250 mLs (750 mg total) into the vein 2 (two) times a day.    acetaminophen (TYLENOL) 325 MG tablet Take 325 mg by mouth every 4 (four) hours as needed for Pain.    arginine-glutamine-calcium HMB (CHEO) 7-7-1.5 gram PwPk Take by mouth 2 (two) times a day.    bisacodyL (DULCOLAX) 10 mg Supp Place 1 suppository (10 mg total) rectally daily as needed (Until bowel movement if patient has no bowel movement for 2 days).    diphenhydrAMINE (BENADRYL) 12.5 mg/5 mL elixir 10 mLs (25 mg total) by Per G Tube route 4 (four) times daily as needed for Allergies.    miconazole nitrate 2% (MICOTIN) 2 % Oint Apply topically 2 (two) times daily. for 14 days    PHENobarbitaL 20 mg/5 mL (4 mg/mL) Elix elixir 25 mLs (100 mg total) by Per G Tube route every evening.     Family History     Problem Relation (Age of Onset)    Cancer Maternal Grandfather        Tobacco Use    Smoking status: Never Smoker    Smokeless tobacco: Never Used   Substance and Sexual Activity    Alcohol use: Never     Frequency: Never    Drug use: Not Currently    Sexual activity: Never     Review of Systems   Unable to perform ROS: Patient nonverbal     Objective:     Vital Signs (Most Recent):  Temp: 97.4 °F (36.3 °C) (11/13/20 2155)  Pulse: (!) 115 (11/13/20 2155)  Resp: 18 (11/13/20 2155)  BP: 133/89 (11/13/20 2155)  SpO2: 100 % (11/13/20 2155) Vital Signs (24h Range):  Temp:  [97.4 °F (36.3 °C)-98.7 °F (37.1 °C)] 97.4 °F (36.3 °C)  Pulse:  [] 115  Resp:  [16-20] 18  SpO2:  [100 %] 100 %  BP: (127-142)/(70-95) 133/89     Weight: 41.5 kg (91 lb 7.9 oz)  Body mass index is 19.8 kg/m².    Physical Exam  Vitals signs  reviewed.   Constitutional:       General: He is not in acute distress.     Appearance: He is ill-appearing. He is not diaphoretic.   HENT:      Head: Normocephalic and atraumatic.      Nose: Nose normal. No congestion.      Mouth/Throat:      Mouth: Mucous membranes are dry.   Eyes:      General: No scleral icterus.     Pupils: Pupils are equal, round, and reactive to light.   Neck:      Musculoskeletal: Normal range of motion and neck supple. No neck rigidity.   Cardiovascular:      Rate and Rhythm: Normal rate and regular rhythm.      Pulses: Normal pulses.      Heart sounds: Normal heart sounds.   Pulmonary:      Effort: Pulmonary effort is normal. No respiratory distress.      Breath sounds: Normal breath sounds. No wheezing or rhonchi.      Comments: Trach collar with O2 at 5L  Trach at midline  Abdominal:      General: Bowel sounds are normal. There is distension.      Palpations: There is no mass.      Tenderness: There is no guarding.       Genitourinary:     Scrotum/Testes:         Right: Swelling present.         Left: Swelling present.      Comments: Iglesias cath in place  Musculoskeletal: Normal range of motion.         General: Deformity (contractures of RUE, BLE) present.   Skin:     General: Skin is warm and dry.      Capillary Refill: Capillary refill takes less than 2 seconds.      Coloration: Skin is not jaundiced.      Findings: Rash (perineal ) present. No bruising.          Neurological:      Mental Status: He is alert. Mental status is at baseline.      Motor: Weakness present.      Comments: Bedbound, non-verbal   Psychiatric:         Speech: He is noncommunicative.         Cognition and Memory: Cognition is impaired.          Significant Labs:   Results for orders placed or performed during the hospital encounter of 11/13/20   Comprehensive Metabolic Panel   Result Value Ref Range    Sodium 157 (H) 136 - 145 mmol/L    Potassium <2.0 (LL) 3.5 - 5.1 mmol/L    Chloride 128 (HH) 95 - 110 mmol/L     CO2 14 (L) 23 - 29 mmol/L    Glucose 71 70 - 110 mg/dL    BUN 18 6 - 20 mg/dL    Creatinine 0.7 0.5 - 1.4 mg/dL    Calcium 8.5 (L) 8.7 - 10.5 mg/dL    Total Protein 6.5 6.0 - 8.4 g/dL    Albumin 1.9 (L) 3.5 - 5.2 g/dL    Total Bilirubin 0.1 0.1 - 1.0 mg/dL    Alkaline Phosphatase 99 55 - 135 U/L    AST 16 10 - 40 U/L    ALT 7 (L) 10 - 44 U/L    Anion Gap 15 8 - 16 mmol/L    eGFR if African American >60 >60 mL/min/1.73 m^2    eGFR if non African American >60 >60 mL/min/1.73 m^2   CBC Auto Differential   Result Value Ref Range    WBC 13.95 (H) 3.90 - 12.70 K/uL    RBC 3.47 (L) 4.60 - 6.20 M/uL    Hemoglobin 9.8 (L) 14.0 - 18.0 g/dL    Hematocrit 32.7 (L) 40.0 - 54.0 %    MCV 94 82 - 98 fL    MCH 28.2 27.0 - 31.0 pg    MCHC 30.0 (L) 32.0 - 36.0 g/dL    RDW 17.0 (H) 11.5 - 14.5 %    Platelets 653 (H) 150 - 350 K/uL    MPV 9.6 9.2 - 12.9 fL    Immature Granulocytes 0.9 (H) 0.0 - 0.5 %    Gran # (ANC) 10.9 (H) 1.8 - 7.7 K/uL    Immature Grans (Abs) 0.12 (H) 0.00 - 0.04 K/uL    Lymph # 1.5 1.0 - 4.8 K/uL    Mono # 1.4 (H) 0.3 - 1.0 K/uL    Eos # 0.0 0.0 - 0.5 K/uL    Baso # 0.01 0.00 - 0.20 K/uL    nRBC 0 0 /100 WBC    Gran % 78.1 (H) 38.0 - 73.0 %    Lymph % 10.9 (L) 18.0 - 48.0 %    Mono % 10.0 4.0 - 15.0 %    Eosinophil % 0.0 0.0 - 8.0 %    Basophil % 0.1 0.0 - 1.9 %    Differential Method Automated    Urinalysis, Reflex to Urine Culture Urine, Clean Catch    Specimen: Urine   Result Value Ref Range    Specimen UA Urine, Catheterized     Color, UA Yellow Yellow, Straw, Vane    Appearance, UA Clear Clear    pH, UA 6.0 5.0 - 8.0    Specific Gravity, UA 1.020 1.005 - 1.030    Protein, UA 2+ (A) Negative    Glucose, UA Negative Negative    Ketones, UA 1+ (A) Negative    Bilirubin (UA) Negative Negative    Occult Blood UA 3+ (A) Negative    Nitrite, UA Negative Negative    Urobilinogen, UA Negative <2.0 EU/dL    Leukocytes, UA 3+ (A) Negative   Urinalysis Microscopic   Result Value Ref Range    RBC, UA 80 (H) 0 - 4 /hpf     WBC, UA 25 (H) 0 - 5 /hpf    Bacteria Rare None-Occ /hpf    Squam Epithel, UA 2 /hpf    Hyaline Casts, UA 0 0-1/lpf /lpf    Microscopic Comment SEE COMMENT    COVID-19 Rapid Screening   Result Value Ref Range    SARS-CoV-2 RNA, Amplification, Qual Negative Negative   Potassium, urine, random   Result Value Ref Range    Potassium, Urine <10 (L) 15 - 95 mmol/L         Significant Imaging:   Imaging Results          CT Abdomen Pelvis  Without Contrast (Final result)  Result time 11/13/20 17:59:27    Final result by Cherie Guadarrama MD (11/13/20 17:59:27)                 Impression:      Diffusely enlarged colon.  Correlate clinically to Hirschsprung.    Remaining of the findings as above    Questionable hip dysplasia.  Recommend clinical correlation      Electronically signed by: Thierry Crespo  Date:    11/13/2020  Time:    17:59             Narrative:    EXAMINATION:  CT ABDOMEN PELVIS WITHOUT CONTRAST    CLINICAL HISTORY:  Abdominal distension;    TECHNIQUE:  Low dose axial images, sagittal and coronal reformations were obtained from the lung bases to the pubic symphysis.  Oral contrast was not administered.    COMPARISON:  None    FINDINGS:  Suboptimal imaging due to lack of contrast.  Iglesias catheter in place mild basilar atelectasis with trace pleural effusion.  Massively enlarged: Predominantly involving the cecum where it measures 12 cm.  Left colon and sigmoid are also dilated.  The small bowel appears decompressed.  Left hepatic cystic lesion measures 6 mm.  Minimal renal pelviectasis.  Spleen adrenal glands pancreas are grossly unremarkable.   G-tube is identified.  Questionable hip dysplasia recommend clinical correlation.                               X-Ray Abdomen AP with Left Decub (Final result)  Result time 11/13/20 13:18:54   Procedure changed from X-Ray Abdomen Flat And Erect     Final result by Barney Acuna III, MD (11/13/20 13:18:54)                 Impression:      Stable colonic dilation with  air-fluid levels, possibly adynamic ileus or toxic megacolon.      Electronically signed by: Barney Acuna MD  Date:    11/13/2020  Time:    13:18             Narrative:    EXAMINATION:  XR ABDOMEN AP WITH LEFT DECUB    CLINICAL HISTORY:  Abdominal pain; Nausea    COMPARISON:  11/01/2020    FINDINGS:  There is persistent fairly diffuse colonic dilation with air-fluid levels which does not appear significantly changed since prior exam.  There may be a few loops of dilated small bowel.  No radiographic evidence of free air.  Sacral decubitus ulcers with underlying osteomyelitis is again noted.                                    Assessment/Plan:     * Hypokalemia  Likely d/t insufficient intake  K+ at <2.0 in ED  Replaced via IV and peg  Recheck in AM  Replete as needed  Continuous cardiac monitoring  Home dose adjustment at discharge        Hypernatremia  Likely d/t dehydration from poor intake  Gentle IV hydration - monitor for fluid overload  BMP q4 hours  Dietitian consult for recommendations      Colostomy malfunction  Edematous and with clear liquid output per John  General surgery consulted  Holding tube feedings for now in case of surgical intervention      Decubitus ulcers  R/t functional quadraplegia  Bilateral ischial stage 4, sacrum stage 3, R medial thigh stage 2  Wound vac in place to B ischial wounds  Wound care consulted  Continue home kota      Functional quadriplegia  Hx of cerebral palsy  Nursing staff to turn q2 hours, float heels  GRAY matress          Osteomyelitis, pelvis  Currently on flagyl, cefepime, vancomycin - continue for now  BC x2 redraws pending  ID consulted  Pharmacy to dose vanc      Cerebral palsy  Continue home muscle relaxants  Wound precautions  GRAY matress        Moderate protein-calorie malnutrition  No peg tube feeding for now in case of AM surgical intervention  Dietitian consulted  Continue home kota       Seizure disorder  Continue home medications  Seizure/aspiration  precautions        VTE Risk Mitigation (From admission, onward)         Ordered     Place sequential compression device  Until discontinued      11/13/20 3894                   Vesna Moraes NP  Department of Hospital Medicine   [facility]

## 2020-11-14 NOTE — HPI
26 y/o WM with hx of seizures, colitis, acid reflux, severe PCM, Peg tub,e pelvic osteo, contractures, cerebral palsy, anemia, proctocolitis to ED from [facility] d/t concerns that his colostomy was not draining well and has been becoming more edematous over the past several day. Pt is non-verbal, limiting ROS. 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 Pt was given K+ and 1L NS and started on IV fluids with potassium; wound vac was applied to his bilateral ischial wounds. Hospital medicine was called and the pt was placed in observation on telemetry. Pt is a full code - surrogate decision maker is his mother, Leni Moscoso.

## 2020-11-14 NOTE — PLAN OF CARE
Intervention: collaboration with care providers    Recommendation:   1) Advance TF as soon as medically able: Nutren 1.5 @ 20 ml/hr advancing to goal of 40 ml/hr + Gerald BID + 120 ml flush q 4 hr   ( provides 1440 kcal ( 99% EEN), 65 g protein ( 100% EPN + Gerald), 729 ml free water)     2) if unable to advance TF in < 4 days consider parenteral nutrition support   3) weigh pt weekly    Goals: 1) Nutrition support advanced in < 4 days  Nutrition Goal Status: new  Communication of RD Recs: (POC, sticky note)

## 2020-11-14 NOTE — HOSPITAL COURSE
Patient 24 yo male placed in observation for colostomy malfunction concern. Patient evaluated by surgery who felt patient colostomy is functioning appropriately. Bowel prolapse noted but not limiting performance. Patient found to be hypokalemic which was replaced. Patient with hypernatremia to be addressed through free water in tube feed regimen. Patient stable, afebrile, and appropriate to return to SNF for completion of ABX course, wound care, and electrolyte management.

## 2020-11-14 NOTE — DISCHARGE SUMMARY
[facility] Hospital Medicine  Discharge Summary      Patient Name: Glen Moscoso  MRN: [id number]  Admission Date: 11/13/2020  Hospital Length of Stay: 1 days  Discharge Date and Time: No discharge date for patient encounter.  Attending Physician: Barney Guerrero MD   Discharging Provider: Barney Guerrero MD  Primary Care Provider: Yadi Lawson MD      HPI:   24 y/o WM with hx of seizures, colitis, acid reflux, severe PCM, Peg tub,e pelvic osteo, contractures, cerebral palsy, anemia, proctocolitis to ED from [facility] d/t concerns that his colostomy was not draining well and has been becoming more edematous over the past several day. Pt is non-verbal, limiting ROS. 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 Pt was given K+ and 1L NS and started on IV fluids with potassium; wound vac was applied to his bilateral ischial wounds. Hospital medicine was called and the pt was placed in observation on telemetry. Pt is a full code - surrogate decision maker is his mother, Leni Moscoso.       * No surgery found *      Hospital Course:   Patient 26 yo male placed in observation for colostomy malfunction concern. Patient evaluated by surgery who felt patient colostomy is functioning appropriately. Bowel prolapse noted but not limiting performance. Patient found to be hypokalemic which was replaced. Patient with hypernatremia to be addressed through free water in tube feed regimen. Patient  stable, afebrile, and appropriate to return to SNF for completion of ABX course, wound care, and electrolyte management.       Consults:   Consults (From admission, onward)        Status Ordering Provider     Inpatient consult to General Surgery  Once     Provider:  Jacquelyn Rao MD    Completed RACHEL FELIX     Inpatient consult to Infectious Diseases  Once     Provider:  Kar Morales MD    Acknowledged RACHEL FELIX     Inpatient consult to Registered Dietitian/Nutritionist  Once     Provider:  (Not yet assigned)    Completed RACHEL FELIX     Inpatient consult to Registered Dietitian/Nutritionist  Once     Provider:  (Not yet assigned)    Completed BEILA DANIEL     Inpatient consult to Registered Dietitian/Nutritionist  Once     Provider:  (Not yet assigned)    Completed BELIA DANIEL     IP consult to case management  Once     Provider:  (Not yet assigned)    Acknowledged BELIA DANIEL     Pharmacy to dose Vancomycin consult  Once     Provider:  (Not yet assigned)    Acknowledged RACHEL FELIX          No new Assessment & Plan notes have been filed under this hospital service since the last note was generated.  Service: Hospital Medicine    Final Active Diagnoses:    Diagnosis Date Noted POA    PRINCIPAL PROBLEM:  Hypokalemia [E87.6] 11/13/2020 Yes    Functional quadriplegia [R53.2] 11/13/2020 Yes    Osteomyelitis, pelvis [M86.9] 10/30/2020 Yes    Moderate protein-calorie malnutrition [E44.0]  Yes    Decubitus ulcers [L89.90]  Yes    Hypernatremia [E87.0] 08/30/2020 Yes    Seizure disorder [G40.909] 08/26/2020 Yes     Chronic    Cerebral palsy [G80.9] 08/04/2020 Yes     Chronic      Problems Resolved During this Admission:    Diagnosis Date Noted Date Resolved POA    Encounter for attention to colostomy [Z43.3]  11/14/2020 Not Applicable    Colostomy malfunction [K94.03] 11/13/2020 11/14/2020 Yes       Discharged Condition: stable    Disposition: Skilled  Nursing Facility    Follow Up:  Follow-up Information     Yadi Lawson MD In 3 days.    Specialty: Family Medicine  Why: following discharge from SNF  Contact information:  [address]  [place] [address] [phone]                 Patient Instructions:      Tube Feedings/Formulas   Order Comments: 1) Advance TF : Nutren 1.5 @ 20 ml/hr advancing to goal of 40 ml/hr + Gerald BID + 120 ml flush q 4 hr     Order Specific Question Answer Comments   Select Adult Formula: Nutren 1.5    Route: Gastrostomy    Formula Rate (mL/hr): 20      Activity as tolerated       Significant Diagnostic Studies: Labs:   BMP:   Recent Labs   Lab 11/13/20  1249 11/14/20  0438   GLU 71 88   * 159*   K <2.0* 4.1   * >130*   CO2 14* 13*   BUN 18 16   CREATININE 0.7 0.6   CALCIUM 8.5* 8.3*   MG  --  2.3   , CMP   Recent Labs   Lab 11/13/20  1249 11/14/20  0438   * 159*   K <2.0* 4.1   * >130*   CO2 14* 13*   GLU 71 88   BUN 18 16   CREATININE 0.7 0.6   CALCIUM 8.5* 8.3*   PROT 6.5  --    ALBUMIN 1.9*  --    BILITOT 0.1  --    ALKPHOS 99  --    AST 16  --    ALT 7*  --    ANIONGAP 15 Unable to calculate   ESTGFRAFRICA >60 >60   EGFRNONAA >60 >60   , CBC   Recent Labs   Lab 11/13/20  1249 11/14/20  0438   WBC 13.95* 17.50*   HGB 9.8* 9.6*   HCT 32.7* 31.6*   * 637*   , INR   Lab Results   Component Value Date    INR 1.2 10/22/2020    INR 1.1 09/01/2020   , Troponin No results for input(s): TROPONINI in the last 168 hours. and All labs within the past 24 hours have been reviewed    Pending Diagnostic Studies:     Procedure Component Value Units Date/Time    Brain natriuretic peptide [[id number]]     Order Status: Sent Lab Status: No result     Specimen: Blood          Medications:  Reconciled Home Medications:      Medication List      CHANGE how you take these medications    propranoloL 20 MG tablet  Commonly known as: INDERAL  1 tablet (20 mg total) by Per G Tube route 3 (three) times daily.  What  changed: when to take this        CONTINUE taking these medications    acetaminophen 325 MG tablet  Commonly known as: TYLENOL  Take 325 mg by mouth every 4 (four) hours as needed for Pain.     ascorbic acid (vitamin C) 500 MG tablet  Commonly known as: VITAMIN C  1 tablet (500 mg total) by Per G Tube route once daily.     baclofen 10 MG tablet  Commonly known as: LIORESAL  1 tablet (10 mg total) by Per G Tube route 3 (three) times daily.     bisacodyL 10 mg Supp  Commonly known as: DULCOLAX  Place 1 suppository (10 mg total) rectally daily as needed (Until bowel movement if patient has no bowel movement for 2 days).     cefepime in dextrose 5 % 2 gram/50 mL Pgbk  Commonly known as: MAXIPIME  Inject 50 mLs (2 g total) into the vein every 8 (eight) hours.     diphenhydrAMINE 12.5 mg/5 mL elixir  Commonly known as: BENADRYL  10 mLs (25 mg total) by Per G Tube route 4 (four) times daily as needed for Allergies.     gabapentin 250 mg/5 mL solution  Commonly known as: NEURONTIN  5 mLs (250 mg total) by Per G Tube route nightly. At bedtime     CHEO 7-7-1.5 gram Pwpk  Generic drug: arginine-glutamine-calcium HMB  Take by mouth 2 (two) times a day.     lactobacillus acidophilus & bulgar 100 million cell packet  Commonly known as: LACTINEX  Take 1 tablet by mouth once daily.     melatonin 3 mg tablet  Commonly known as: MELATIN  2 tablets (6 mg total) by Per G Tube route nightly as needed for Insomnia.     metroNIDAZOLE 500 MG tablet  Commonly known as: FLAGYL  1 tablet (500 mg total) by Per G Tube route every 8 (eight) hours. for 10 days     miconazole nitrate 2% 2 % Oint  Commonly known as: MICOTIN  Apply topically 2 (two) times daily. for 14 days     multivitamin liquid no.118 Liqd  10 mLs by Per G Tube route once daily.     * PHENobarbitaL 20 mg/5 mL (4 mg/mL) Elix elixir  25 mLs (100 mg total) by Per G Tube route every evening.     * PHENobarbitaL 20 mg/5 mL (4 mg/mL) Elix elixir  25 mLs (100 mg total) by Per G Tube  route every evening.     potassium chloride 20 mEq Pack  Commonly known as: KLOR-CON  DISSOLVE 2 TABLETS AS DIRECTED AND DRINK DAILY     VANCOMYCIN 750 MG/250 ML D5W (READY TO MIX SYSTEM)  Inject 250 mLs (750 mg total) into the vein 2 (two) times a day.         * This list has 2 medication(s) that are the same as other medications prescribed for you. Read the directions carefully, and ask your doctor or other care provider to review them with you.                Indwelling Lines/Drains at time of discharge:   Lines/Drains/Airways     Central Venous Catheter Line            Tunneled Central Line Insertion/Assessment - Double Lumen  09/28/20 1400 right subclavian 47 days          Drain                 Gastrostomy/Enterostomy 08/04/20 1653 Percutaneous endoscopic gastrostomy (PEG) feeding 101 days         Gastrostomy/Enterostomy 10/22/20 1300 LUQ 23 days         Colostomy 10/29/20 1349 Loop LLQ 16 days         Urethral Catheter 11/13/20 1230 Latex 16 Fr. 1 day          Airway                 Surgical Airway 10/27/20 0855 Shiley Cuffed 18 days                Time spent on the discharge of patient: 36 minutes  Patient was seen and examined on the date of discharge and determined to be suitable for discharge.         Barney Guerrero MD  Department of Hospital Medicine  [facility]

## 2020-11-14 NOTE — ASSESSMENT & PLAN NOTE
S/p loop sigmoid colostomy now with prolapse but without obstruction    Routine ostomy care  No evidence of ischemia related to prolapse  Patient with bowel function despite imaging suggesting of adynamic ileus  No acute surgical intervention necessary

## 2020-11-14 NOTE — PLAN OF CARE
Pt nonverbal at baseline, NICOLA orientation. VSS. Pt remained free of falls this shift. No signs of pain or discomfort. Medications administered as ordered. Pt is NSR with occasional sinus tach on monitor. Double-lumen tunneled central line intact, D51/2NS & NS with 20 meq KCl infusing . Hourly rounding completed. Avasys in use. POC reviewed.No evidence of learning. Will continue to monitor.       Problem: Fall Injury Risk  Goal: Absence of Fall and Fall-Related Injury  Outcome: Ongoing, Progressing  Intervention: Identify and Manage Contributors to Fall Injury Risk  Flowsheets (Taken 11/14/2020 0503)  Medication Review/Management: medications reviewed  Intervention: Promote Injury-Free Environment  Flowsheets (Taken 11/14/2020 0503)  Safety Promotion/Fall Prevention:   /camera at bedside   side rails raised x 3  Environmental Safety Modification: lighting adjusted     Problem: Adult Inpatient Plan of Care  Goal: Plan of Care Review  Outcome: Ongoing, Progressing  Goal: Patient-Specific Goal (Individualization)  Outcome: Ongoing, Progressing  Goal: Absence of Hospital-Acquired Illness or Injury  Outcome: Ongoing, Progressing  Intervention: Identify and Manage Fall Risk  Flowsheets (Taken 11/14/2020 0503)  Safety Promotion/Fall Prevention:   /camera at bedside   side rails raised x 3  Intervention: Prevent VTE (venous thromboembolism)  Flowsheets (Taken 11/14/2020 0503)  VTE Prevention/Management: intravenous hydration  Goal: Optimal Comfort and Wellbeing  Outcome: Ongoing, Progressing  Intervention: Provide Person-Centered Care  Flowsheets (Taken 11/14/2020 0503)  Trust Relationship/Rapport: care explained  Goal: Readiness for Transition of Care  Outcome: Ongoing, Progressing  Goal: Rounds/Family Conference  Outcome: Ongoing, Progressing     Problem: Fluid Imbalance (Pneumonia)  Goal: Fluid Balance  Outcome: Ongoing, Progressing  Intervention: Monitor and Manage Fluid  Balance  Flowsheets (Taken 11/14/2020 0503)  Fluid/Electrolyte Management: electrolyte supplement initiated     Problem: Infection (Pneumonia)  Goal: Resolution of Infection Signs/Symptoms  Outcome: Ongoing, Progressing  Intervention: Prevent Infection Progression  Flowsheets (Taken 11/14/2020 0503)  Fever Reduction/Comfort Measures:   lightweight bedding   lightweight clothing     Problem: Respiratory Compromise (Pneumonia)  Goal: Effective Oxygenation and Ventilation  Outcome: Ongoing, Progressing  Intervention: Optimize Oxygenation and Ventilation  Flowsheets (Taken 11/14/2020 0503)  Airway/Ventilation Management: airway patency maintained  Head of Bed (HOB): HOB elevated     Problem: Infection  Goal: Infection Symptom Resolution  Outcome: Ongoing, Progressing  Intervention: Prevent or Manage Infection  Flowsheets (Taken 11/14/2020 0503)  Fever Reduction/Comfort Measures:   lightweight bedding   lightweight clothing     Problem: Wound  Goal: Optimal Wound Healing  Outcome: Ongoing, Progressing  Intervention: Promote Effective Wound Healing  Flowsheets (Taken 11/14/2020 0503)  Sleep/Rest Enhancement: noise level reduced     Problem: Skin Injury Risk Increased  Goal: Skin Health and Integrity  Outcome: Ongoing, Progressing  Intervention: Optimize Skin Protection  Flowsheets (Taken 11/14/2020 0503)  Head of Bed (HOB): HOB elevated     Problem: Electrolyte Imbalance  Goal: Electrolyte Balance  Outcome: Ongoing, Progressing     Problem: Adjustment to Surgery (Colostomy)  Goal: Psychosocial Adjustment Initiation  Outcome: Ongoing, Progressing     Problem: Bleeding (Colostomy)  Goal: Absence of Bleeding  Outcome: Ongoing, Progressing     Problem: Fluid Imbalance (Colostomy)  Goal: Fluid Balance  Outcome: Ongoing, Progressing     Problem: Infection (Colostomy)  Goal: Absence of Infection Signs/Symptoms  Outcome: Ongoing, Progressing     Problem: Ongoing Anesthesia Effects (Colostomy)  Goal: Anesthesia/Sedation  Recovery  Outcome: Ongoing, Progressing     Problem: Pain (Colostomy)  Goal: Acceptable Pain Control  Outcome: Ongoing, Progressing     Problem: Postoperative Nausea and Vomiting (Colostomy)  Goal: Nausea and Vomiting Relief  Outcome: Ongoing, Progressing     Problem: Postoperative Stoma Care (Colostomy)  Goal: Optimal Stoma Healing  Outcome: Ongoing, Progressing     Problem: Postoperative Urinary Retention (Colostomy)  Goal: Effective Urinary Elimination  Outcome: Ongoing, Progressing

## 2020-11-14 NOTE — HPI
25-year-old male with cerebral palsy, bed ridden, with decubitus ulcers, status post recent loop sigmoid colostomy by Dr. Ortez admitted for hypokalemia.  He has since developed ostomy prolapse.  CT evaluation showed diffuse distention of his entire colon without evidence of obstruction.  Surgery was consulted for evaluation.

## 2020-11-14 NOTE — ASSESSMENT & PLAN NOTE
Likely d/t dehydration from poor intake  Gentle IV hydration - monitor for fluid overload  BMP q4 hours  Dietitian consult for recommendations

## 2020-11-14 NOTE — PLAN OF CARE
FELISHA sent D/C orders to [facility] via [employer].  Secure chat  sent to nurse to call report to [phone].       11/14/20 1537   Final Note   Assessment Type Final Discharge Note   Anticipated Discharge Disposition LTAC   Right Care Referral Info   Post Acute Recommendation SNF / Sub-Acute Rehab   Facility Name [place], [place]   Post-Acute Status   Discharge Delays None known at this time     Cassy Luna LMSW 11/14/2020 3:40 PM

## 2020-11-14 NOTE — PROGRESS NOTES
Pharmacokinetic Assessment Follow Up: IV Vancomycin    Vancomycin serum concentration assessment(s):    The trough level was drawn correctly and can be used to guide therapy at this time. The measurement is above the desired definitive target range of 15 to 20 mcg/mL.    Vancomycin Regimen Plan:    Discontinue the scheduled vancomycin regimen and re-dose when the random level is less than 20 mcg/mL, next level to be drawn at 1800 on 11/14/20..    Drug levels (last 3 results):  Recent Labs   Lab Result Units 11/13/20  2332 11/14/20  1141   Vancomycin, Random ug/mL 11.7  --    Vancomycin-Trough ug/mL  --  25.5*       Pharmacy will continue to follow and monitor vancomycin.    Please contact pharmacy at extension [phone] for questions regarding this assessment.    Thank you for the consult,   Anjelica Duke PharmD       Patient brief summary:  Glen Moscoso is a 25 y.o. male initiated on antimicrobial therapy with IV Vancomycin for treatment of bone/joint infection    The patient's current regimen is Vancomycin 750 mg IV every 12 hours. Consider reducing dose to Vancomycin 500 mg IV every 12 hours once random level is less than 20 mcg/mL    Drug Allergies:   Review of patient's allergies indicates:   Allergen Reactions    Morphine sulfate Hives       Actual Body Weight:   41.5 kg    Renal Function:   Estimated Creatinine Clearance: 112.4 mL/min (based on SCr of 0.6 mg/dL).,     Dialysis Method (if applicable):  N/A    CBC (last 72 hours):  Recent Labs   Lab Result Units 11/13/20  1249 11/14/20  0438   WBC K/uL 13.95* 17.50*   Hemoglobin g/dL 9.8* 9.6*   Hematocrit % 32.7* 31.6*   Platelets K/uL 653* 637*   Gran % % 78.1* 72.0   Lymph % % 10.9* 11.5*   Mono % % 10.0 12.3   Eosinophil % % 0.0 0.2   Basophil % % 0.1 0.5   Differential Method  Automated Automated       Metabolic Panel (last 72 hours):  Recent Labs   Lab Result Units 11/13/20  1230 11/13/20  1249 11/13/20  1814 11/14/20  0438   Sodium mmol/L  --  157*  --   159*   Potassium mmol/L  --  <2.0*  --  4.1   Potassium, Urine mmol/L  --   --  <10*  --    Chloride mmol/L  --  128*  --  >130*   CO2 mmol/L  --  14*  --  13*   Glucose mg/dL  --  71  --  88   Glucose, UA  Negative  --   --   --    BUN mg/dL  --  18  --  16   Creatinine mg/dL  --  0.7  --  0.6   Albumin g/dL  --  1.9*  --   --    Total Bilirubin mg/dL  --  0.1  --   --    Alkaline Phosphatase U/L  --  99  --   --    AST U/L  --  16  --   --    ALT U/L  --  7*  --   --    Magnesium mg/dL  --   --   --  2.3       Vancomycin Administrations:  vancomycin given in the last 96 hours                   vancomycin 750 mg in dextrose 5 % 250 mL IVPB (ready to mix system) (mg) 750 mg New Bag 11/14/20 0040                Microbiologic Results:  Microbiology Results (last 7 days)     Procedure Component Value Units Date/Time    Blood culture [[id number]] Collected: 11/13/20 2332    Order Status: Completed Specimen: Blood from Antecubital, Left Updated: 11/14/20 1115     Blood Culture, Routine No Growth to date    Narrative:      Collection has been rescheduled by [facility] at 11/13/2020 21:38 Reason:   Patient unavailable not in room  Collection has been rescheduled by [facility] at 11/13/2020 21:38 Reason:   Patient unavailable not in room    Blood culture [[id number]] Collected: 11/13/20 2332    Order Status: Completed Specimen: Blood from Antecubital, Left Updated: 11/14/20 1115     Blood Culture, Routine No Growth to date    Narrative:      Collection has been rescheduled by [facility] at 11/13/2020 21:38 Reason:   Patient unavailable not in room  Collection has been rescheduled by [facility] at 11/13/2020 21:38 Reason:   Patient unavailable not in room    Urine culture [[id number]] Collected: 11/13/20 1230    Order Status: No result Specimen: Urine Updated: 11/13/20 6098        Thank you for allowing us to participate in this patient's care.     Anjelica Duke PharmD 11/14/2020 2:01 PM

## 2020-11-14 NOTE — ASSESSMENT & PLAN NOTE
Currently on flagyl, cefepime, vancomycin - continue for now  BC x2 redraws pending  ID consulted  Pharmacy to dose vanc

## 2020-11-14 NOTE — PROGRESS NOTES
Pharmacokinetic Initial Assessment: IV Vancomycin    Assessment/Plan:    Due to current or recent Vancomycin therapy with a random level of 11.7 mcg/mL @ 2330 tonight, this patient will receive maintenance doses beginning tonight at approximately 0030 AM.     Initiate intravenous vancomycin with loading dose of 750 mg once followed by a maintenance dose of vancomycin 750 mg IV every 12 hours  Desired empiric serum trough concentration is 15 to 20 mcg/mL  Draw vancomycin trough level 60 min prior to the next dose on 11/14 at approximately 1130  Pharmacy will continue to follow and monitor vancomycin.      Please contact pharmacy at extension [phone] with any questions regarding this assessment.     Thank you for the consult,   Martin Burt       Patient brief summary:  Glen Moscoso is a 25 y.o. male initiated on antimicrobial therapy with IV Vancomycin for treatment of suspected bone/joint infection    Drug Allergies:   Review of patient's allergies indicates:   Allergen Reactions    Morphine sulfate Hives       Actual Body Weight:   41.5 kg    Renal Function:   Estimated Creatinine Clearance: 96.3 mL/min (based on SCr of 0.7 mg/dL).,     Dialysis Method (if applicable):  N/A    CBC (last 72 hours):  Recent Labs   Lab Result Units 11/13/20  1249   WBC K/uL 13.95*   Hemoglobin g/dL 9.8*   Hematocrit % 32.7*   Platelets K/uL 653*   Gran % % 78.1*   Lymph % % 10.9*   Mono % % 10.0   Eosinophil % % 0.0   Basophil % % 0.1   Differential Method  Automated       Metabolic Panel (last 72 hours):  Recent Labs   Lab Result Units 11/13/20  1230 11/13/20  1249 11/13/20  1814   Sodium mmol/L  --  157*  --    Potassium mmol/L  --  <2.0*  --    Potassium, Urine mmol/L  --   --  <10*   Chloride mmol/L  --  128*  --    CO2 mmol/L  --  14*  --    Glucose mg/dL  --  71  --    Glucose, UA  Negative  --   --    BUN mg/dL  --  18  --    Creatinine mg/dL  --  0.7  --    Albumin g/dL  --  1.9*  --    Total Bilirubin mg/dL  --  0.1  --     Alkaline Phosphatase U/L  --  99  --    AST U/L  --  16  --    ALT U/L  --  7*  --        Drug levels (last 3 results):  Recent Labs   Lab Result Units 11/13/20 2332   Vancomycin, Random ug/mL 11.7       Microbiologic Results:  Microbiology Results (last 7 days)       Procedure Component Value Units Date/Time    Blood culture [[id number]] Collected: 11/13/20 2332    Order Status: Sent Specimen: Blood from Antecubital, Left Updated: 11/13/20 2332    Narrative:      Collection has been rescheduled by [facility] at 11/13/2020 21:38 Reason:   Patient unavailable not in room  Collection has been rescheduled by [facility] at 11/13/2020 21:38 Reason:   Patient unavailable not in room    Blood culture [[id number]] Collected: 11/13/20 2332    Order Status: Sent Specimen: Blood from Antecubital, Left Updated: 11/13/20 2332    Narrative:      Collection has been rescheduled by [facility] at 11/13/2020 21:38 Reason:   Patient unavailable not in room  Collection has been rescheduled by [facility] at 11/13/2020 21:38 Reason:   Patient unavailable not in room    Urine culture [[id number]] Collected: 11/13/20 1230    Order Status: No result Specimen: Urine Updated: 11/13/20 1418

## 2020-11-14 NOTE — CONSULTS
[facility] -   General Surgery  Consult Note    Patient Name: Glen Moscoso  MRN: [id number]  Code Status: Full Code  Admission Date: 2020  Hospital Length of Stay: 1 days  Attending Physician: Barney Guerrero MD  Primary Care Provider: Yadi Lawson MD    Patient information was obtained from ER records.     Inpatient consult to General Surgery  Consult performed by: Jacquelyn Rao MD  Consult ordered by: Vesna Moraes NP        Subjective:     Principal Problem: Hypokalemia    History of Present Illness: 25-year-old male with cerebral palsy, bed ridden, with decubitus ulcers, status post recent loop sigmoid colostomy by Dr. Ortez admitted for hypokalemia.  He has since developed ostomy prolapse.  CT evaluation showed diffuse distention of his entire colon without evidence of obstruction.  Surgery was consulted for evaluation.    No current facility-administered medications on file prior to encounter.      Current Outpatient Medications on File Prior to Encounter   Medication Sig    ascorbic acid, vitamin C, (VITAMIN C) 500 MG tablet 1 tablet (500 mg total) by Per G Tube route once daily.    baclofen (LIORESAL) 10 MG tablet 1 tablet (10 mg total) by Per G Tube route 3 (three) times daily.    cefepime in dextrose 5 % (MAXIPIME) 2 gram/50 mL PgBk Inject 50 mLs (2 g total) into the vein every 8 (eight) hours.    gabapentin (NEURONTIN) 250 mg/5 mL solution 5 mLs (250 mg total) by Per G Tube route nightly. At bedtime    lactobacillus acidophilus & bulgar (LACTINEX) 100 million cell packet Take 1 tablet by mouth once daily.    melatonin (MELATIN) 3 mg tablet 2 tablets (6 mg total) by Per G Tube route nightly as needed for Insomnia.    [] metroNIDAZOLE (FLAGYL) 500 MG tablet 1 tablet (500 mg total) by Per G Tube route every 8 (eight) hours. for 10 days    multivitamin liquid no.118 Liqd 10 mLs by Per G Tube route once daily.    PHENobarbitaL 20 mg/5 mL (4 mg/mL) Elix elixir  25 mLs (100 mg total) by Per G Tube route every evening.    potassium chloride (KLOR-CON) 20 mEq Pack DISSOLVE 2 TABLETS AS DIRECTED AND DRINK DAILY    propranoloL (INDERAL) 20 MG tablet 1 tablet (20 mg total) by Per G Tube route 3 (three) times daily. (Patient taking differently: 20 mg by Per G Tube route 4 (four) times daily. )    vancomycin HCl (VANCOMYCIN 750 MG/250 ML D5W, READY TO MIX SYSTEM,) Inject 250 mLs (750 mg total) into the vein 2 (two) times a day.    acetaminophen (TYLENOL) 325 MG tablet Take 325 mg by mouth every 4 (four) hours as needed for Pain.    arginine-glutamine-calcium HMB (CHEO) 7-7-1.5 gram PwPk Take by mouth 2 (two) times a day.    bisacodyL (DULCOLAX) 10 mg Supp Place 1 suppository (10 mg total) rectally daily as needed (Until bowel movement if patient has no bowel movement for 2 days).    diphenhydrAMINE (BENADRYL) 12.5 mg/5 mL elixir 10 mLs (25 mg total) by Per G Tube route 4 (four) times daily as needed for Allergies.    miconazole nitrate 2% (MICOTIN) 2 % Oint Apply topically 2 (two) times daily. for 14 days    PHENobarbitaL 20 mg/5 mL (4 mg/mL) Elix elixir 25 mLs (100 mg total) by Per G Tube route every evening.       Review of patient's allergies indicates:   Allergen Reactions    Morphine sulfate Hives       Past Medical History:   Diagnosis Date    Acid reflux     Anemia of chronic disease 8/30/2020    Cerebral palsy     Colitis     Decubitus ulcer of ischial area, left, stage IV 9/10/2020    History of hip surgery     Osteomyelitis of pelvis 9/24/2020    Pneumoperitoneum 9/21/2020    Pressure injury of right ischium, stage 4     S/P percutaneous endoscopic gastrostomy (PEG) tube placement     Seizure disorder 8/26/2020    Seizures     Severe protein-calorie malnutrition     Sinus tachycardia 8/4/2020     Past Surgical History:   Procedure Laterality Date    CHOLECYSTECTOMY      FLEXIBLE SIGMOIDOSCOPY N/A 9/9/2020    Procedure: SIGMOIDOSCOPY, FLEXIBLE;   Surgeon: America Goode MD;  [facility]  Service: Endoscopy;  Laterality: N/A;    HIP SURGERY      TRACHEOSTOMY N/A 10/27/2020    Procedure: CREATION, TRACHEOSTOMY;  Surgeon: Kar Palma MD;  Location: [facility] OR;  Service: ENT;  Laterality: N/A;     Family History     Problem Relation (Age of Onset)    Cancer Maternal Grandfather        Tobacco Use    Smoking status: Never Smoker    Smokeless tobacco: Never Used   Substance and Sexual Activity    Alcohol use: Never     Frequency: Never    Drug use: Not Currently    Sexual activity: Never     Review of Systems   Unable to perform ROS: Patient nonverbal     Objective:     Vital Signs (Most Recent):  Temp: 97 °F (36.1 °C) (11/14/20 1218)  Pulse: 60 (11/14/20 1218)  Resp: 18 (11/14/20 1218)  BP: 122/82 (11/14/20 1218)  SpO2: 100 % (11/14/20 1218) Vital Signs (24h Range):  Temp:  [96.4 °F (35.8 °C)-98.7 °F (37.1 °C)] 97 °F (36.1 °C)  Pulse:  [] 60  Resp:  [18-20] 18  SpO2:  [100 %] 100 %  BP: (122-150)/(80-95) 122/82     Weight: 41.5 kg (91 lb 7.9 oz)  Body mass index is 19.8 kg/m².    Physical Exam  Constitutional:       Appearance: He is not toxic-appearing.   Neck:      Comments: Trach in place  Pulmonary:      Effort: No respiratory distress.   Abdominal:      General: There is distension (mild).      Tenderness: There is no abdominal tenderness. There is no guarding or rebound.      Hernia: No hernia is present.      Comments: Ostomy with prolapse  Stool in appliance   Musculoskeletal:      Comments: contracture   Neurological:      Mental Status: He is alert.         Significant Labs:  CBC:   Recent Labs   Lab 11/14/20 0438   WBC 17.50*   RBC 3.41*   HGB 9.6*   HCT 31.6*   *   MCV 93   MCH 28.2   MCHC 30.4*     BMP:   Recent Labs   Lab 11/14/20 0438   GLU 88   *   K 4.1   CL >130*   CO2 13*   BUN 16   CREATININE 0.6   CALCIUM 8.3*   MG 2.3       Significant Diagnostics:  I have reviewed and interpreted all pertinent  imaging results/findings within the past 24 hours. no pneumoperitoneum, no obstruction, prolapsed ostomy, diffusely dilated colon    Assessment/Plan:     Colostomy malfunction  S/p loop sigmoid colostomy now with prolapse but without obstruction    Routine ostomy care  No evidence of ischemia related to prolapse  Patient with bowel function despite imaging suggesting of adynamic ileus  No acute surgical intervention necessary        VTE Risk Mitigation (From admission, onward)         Ordered     Place sequential compression device  Until discontinued      11/13/20 9401                Thank you for your consult. I will sign off. Please contact us if you have any additional questions.    Jacquelyn Rao MD  General Surgery  [facility] -

## 2020-11-14 NOTE — SUBJECTIVE & OBJECTIVE
Past Medical History:   Diagnosis Date    Acid reflux     Anemia of chronic disease 8/30/2020    Cerebral palsy     Colitis     Decubitus ulcer of ischial area, left, stage IV 9/10/2020    History of hip surgery     Osteomyelitis of pelvis 9/24/2020    Pneumoperitoneum 9/21/2020    Pressure injury of right ischium, stage 4     S/P percutaneous endoscopic gastrostomy (PEG) tube placement     Seizure disorder 8/26/2020    Seizures     Severe protein-calorie malnutrition     Sinus tachycardia 8/4/2020       Past Surgical History:   Procedure Laterality Date    CHOLECYSTECTOMY      FLEXIBLE SIGMOIDOSCOPY N/A 9/9/2020    Procedure: SIGMOIDOSCOPY, FLEXIBLE;  Surgeon: America Goode MD;  [facility] ENDO [address]);  Service: Endoscopy;  Laterality: N/A;    HIP SURGERY      TRACHEOSTOMY N/A 10/27/2020    Procedure: CREATION, TRACHEOSTOMY;  Surgeon: Kar Palma MD;  Location: [facility];  Service: ENT;  Laterality: N/A;       Review of patient's allergies indicates:   Allergen Reactions    Morphine sulfate Hives       No current facility-administered medications on file prior to encounter.      Current Outpatient Medications on File Prior to Encounter   Medication Sig    ascorbic acid, vitamin C, (VITAMIN C) 500 MG tablet 1 tablet (500 mg total) by Per G Tube route once daily.    baclofen (LIORESAL) 10 MG tablet 1 tablet (10 mg total) by Per G Tube route 3 (three) times daily.    cefepime in dextrose 5 % (MAXIPIME) 2 gram/50 mL PgBk Inject 50 mLs (2 g total) into the vein every 8 (eight) hours.    gabapentin (NEURONTIN) 250 mg/5 mL solution 5 mLs (250 mg total) by Per G Tube route nightly. At bedtime    lactobacillus acidophilus & bulgar (LACTINEX) 100 million cell packet Take 1 tablet by mouth once daily.    melatonin (MELATIN) 3 mg tablet 2 tablets (6 mg total) by Per G Tube route nightly as needed for Insomnia.    metroNIDAZOLE (FLAGYL) 500 MG tablet 1 tablet (500 mg total) by Per G Tube route  every 8 (eight) hours. for 10 days    multivitamin liquid no.118 Liqd 10 mLs by Per G Tube route once daily.    PHENobarbitaL 20 mg/5 mL (4 mg/mL) Elix elixir 25 mLs (100 mg total) by Per G Tube route every evening.    potassium chloride (KLOR-CON) 20 mEq Pack DISSOLVE 2 TABLETS AS DIRECTED AND DRINK DAILY    propranoloL (INDERAL) 20 MG tablet 1 tablet (20 mg total) by Per G Tube route 3 (three) times daily. (Patient taking differently: 20 mg by Per G Tube route 4 (four) times daily. )    vancomycin HCl (VANCOMYCIN 750 MG/250 ML D5W, READY TO MIX SYSTEM,) Inject 250 mLs (750 mg total) into the vein 2 (two) times a day.    acetaminophen (TYLENOL) 325 MG tablet Take 325 mg by mouth every 4 (four) hours as needed for Pain.    arginine-glutamine-calcium HMB (CHEO) 7-7-1.5 gram PwPk Take by mouth 2 (two) times a day.    bisacodyL (DULCOLAX) 10 mg Supp Place 1 suppository (10 mg total) rectally daily as needed (Until bowel movement if patient has no bowel movement for 2 days).    diphenhydrAMINE (BENADRYL) 12.5 mg/5 mL elixir 10 mLs (25 mg total) by Per G Tube route 4 (four) times daily as needed for Allergies.    miconazole nitrate 2% (MICOTIN) 2 % Oint Apply topically 2 (two) times daily. for 14 days    PHENobarbitaL 20 mg/5 mL (4 mg/mL) Elix elixir 25 mLs (100 mg total) by Per G Tube route every evening.     Family History     Problem Relation (Age of Onset)    Cancer Maternal Grandfather        Tobacco Use    Smoking status: Never Smoker    Smokeless tobacco: Never Used   Substance and Sexual Activity    Alcohol use: Never     Frequency: Never    Drug use: Not Currently    Sexual activity: Never     Review of Systems   Unable to perform ROS: Patient nonverbal     Objective:     Vital Signs (Most Recent):  Temp: 97.4 °F (36.3 °C) (11/13/20 2155)  Pulse: (!) 115 (11/13/20 2155)  Resp: 18 (11/13/20 2155)  BP: 133/89 (11/13/20 2155)  SpO2: 100 % (11/13/20 2155) Vital Signs (24h Range):  Temp:  [97.4 °F  (36.3 °C)-98.7 °F (37.1 °C)] 97.4 °F (36.3 °C)  Pulse:  [] 115  Resp:  [16-20] 18  SpO2:  [100 %] 100 %  BP: (127-142)/(70-95) 133/89     Weight: 41.5 kg (91 lb 7.9 oz)  Body mass index is 19.8 kg/m².    Physical Exam  Vitals signs reviewed.   Constitutional:       General: He is not in acute distress.     Appearance: He is ill-appearing. He is not diaphoretic.   HENT:      Head: Normocephalic and atraumatic.      Nose: Nose normal. No congestion.      Mouth/Throat:      Mouth: Mucous membranes are dry.   Eyes:      General: No scleral icterus.     Pupils: Pupils are equal, round, and reactive to light.   Neck:      Musculoskeletal: Normal range of motion and neck supple. No neck rigidity.   Cardiovascular:      Rate and Rhythm: Normal rate and regular rhythm.      Pulses: Normal pulses.      Heart sounds: Normal heart sounds.   Pulmonary:      Effort: Pulmonary effort is normal. No respiratory distress.      Breath sounds: Normal breath sounds. No wheezing or rhonchi.      Comments: Trach collar with O2 at 5L  Trach at midline  Abdominal:      General: Bowel sounds are normal. There is distension.      Palpations: There is no mass.      Tenderness: There is no guarding.       Genitourinary:     Scrotum/Testes:         Right: Swelling present.         Left: Swelling present.      Comments: Iglesias cath in place  Musculoskeletal: Normal range of motion.         General: Deformity (contractures of RUE, BLE) present.   Skin:     General: Skin is warm and dry.      Capillary Refill: Capillary refill takes less than 2 seconds.      Coloration: Skin is not jaundiced.      Findings: Rash (perineal ) present. No bruising.          Neurological:      Mental Status: He is alert. Mental status is at baseline.      Motor: Weakness present.      Comments: Bedbound, non-verbal   Psychiatric:         Speech: He is noncommunicative.         Cognition and Memory: Cognition is impaired.          Significant Labs:   Results for  orders placed or performed during the hospital encounter of 11/13/20   Comprehensive Metabolic Panel   Result Value Ref Range    Sodium 157 (H) 136 - 145 mmol/L    Potassium <2.0 (LL) 3.5 - 5.1 mmol/L    Chloride 128 (HH) 95 - 110 mmol/L    CO2 14 (L) 23 - 29 mmol/L    Glucose 71 70 - 110 mg/dL    BUN 18 6 - 20 mg/dL    Creatinine 0.7 0.5 - 1.4 mg/dL    Calcium 8.5 (L) 8.7 - 10.5 mg/dL    Total Protein 6.5 6.0 - 8.4 g/dL    Albumin 1.9 (L) 3.5 - 5.2 g/dL    Total Bilirubin 0.1 0.1 - 1.0 mg/dL    Alkaline Phosphatase 99 55 - 135 U/L    AST 16 10 - 40 U/L    ALT 7 (L) 10 - 44 U/L    Anion Gap 15 8 - 16 mmol/L    eGFR if African American >60 >60 mL/min/1.73 m^2    eGFR if non African American >60 >60 mL/min/1.73 m^2   CBC Auto Differential   Result Value Ref Range    WBC 13.95 (H) 3.90 - 12.70 K/uL    RBC 3.47 (L) 4.60 - 6.20 M/uL    Hemoglobin 9.8 (L) 14.0 - 18.0 g/dL    Hematocrit 32.7 (L) 40.0 - 54.0 %    MCV 94 82 - 98 fL    MCH 28.2 27.0 - 31.0 pg    MCHC 30.0 (L) 32.0 - 36.0 g/dL    RDW 17.0 (H) 11.5 - 14.5 %    Platelets 653 (H) 150 - 350 K/uL    MPV 9.6 9.2 - 12.9 fL    Immature Granulocytes 0.9 (H) 0.0 - 0.5 %    Gran # (ANC) 10.9 (H) 1.8 - 7.7 K/uL    Immature Grans (Abs) 0.12 (H) 0.00 - 0.04 K/uL    Lymph # 1.5 1.0 - 4.8 K/uL    Mono # 1.4 (H) 0.3 - 1.0 K/uL    Eos # 0.0 0.0 - 0.5 K/uL    Baso # 0.01 0.00 - 0.20 K/uL    nRBC 0 0 /100 WBC    Gran % 78.1 (H) 38.0 - 73.0 %    Lymph % 10.9 (L) 18.0 - 48.0 %    Mono % 10.0 4.0 - 15.0 %    Eosinophil % 0.0 0.0 - 8.0 %    Basophil % 0.1 0.0 - 1.9 %    Differential Method Automated    Urinalysis, Reflex to Urine Culture Urine, Clean Catch    Specimen: Urine   Result Value Ref Range    Specimen UA Urine, Catheterized     Color, UA Yellow Yellow, Straw, Vane    Appearance, UA Clear Clear    pH, UA 6.0 5.0 - 8.0    Specific Gravity, UA 1.020 1.005 - 1.030    Protein, UA 2+ (A) Negative    Glucose, UA Negative Negative    Ketones, UA 1+ (A) Negative    Bilirubin (UA)  Negative Negative    Occult Blood UA 3+ (A) Negative    Nitrite, UA Negative Negative    Urobilinogen, UA Negative <2.0 EU/dL    Leukocytes, UA 3+ (A) Negative   Urinalysis Microscopic   Result Value Ref Range    RBC, UA 80 (H) 0 - 4 /hpf    WBC, UA 25 (H) 0 - 5 /hpf    Bacteria Rare None-Occ /hpf    Squam Epithel, UA 2 /hpf    Hyaline Casts, UA 0 0-1/lpf /lpf    Microscopic Comment SEE COMMENT    COVID-19 Rapid Screening   Result Value Ref Range    SARS-CoV-2 RNA, Amplification, Qual Negative Negative   Potassium, urine, random   Result Value Ref Range    Potassium, Urine <10 (L) 15 - 95 mmol/L         Significant Imaging:   Imaging Results          CT Abdomen Pelvis  Without Contrast (Final result)  Result time 11/13/20 17:59:27    Final result by Cherie Guadarrama MD (11/13/20 17:59:27)                 Impression:      Diffusely enlarged colon.  Correlate clinically to Hirschsprung.    Remaining of the findings as above    Questionable hip dysplasia.  Recommend clinical correlation      Electronically signed by: Thierry Crespo  Date:    11/13/2020  Time:    17:59             Narrative:    EXAMINATION:  CT ABDOMEN PELVIS WITHOUT CONTRAST    CLINICAL HISTORY:  Abdominal distension;    TECHNIQUE:  Low dose axial images, sagittal and coronal reformations were obtained from the lung bases to the pubic symphysis.  Oral contrast was not administered.    COMPARISON:  None    FINDINGS:  Suboptimal imaging due to lack of contrast.  Iglesias catheter in place mild basilar atelectasis with trace pleural effusion.  Massively enlarged: Predominantly involving the cecum where it measures 12 cm.  Left colon and sigmoid are also dilated.  The small bowel appears decompressed.  Left hepatic cystic lesion measures 6 mm.  Minimal renal pelviectasis.  Spleen adrenal glands pancreas are grossly unremarkable.   G-tube is identified.  Questionable hip dysplasia recommend clinical correlation.                               X-Ray Abdomen AP with  Left Decub (Final result)  Result time 11/13/20 13:18:54   Procedure changed from X-Ray Abdomen Flat And Erect     Final result by Barney Acuna III, MD (11/13/20 13:18:54)                 Impression:      Stable colonic dilation with air-fluid levels, possibly adynamic ileus or toxic megacolon.      Electronically signed by: Barney Acuna MD  Date:    11/13/2020  Time:    13:18             Narrative:    EXAMINATION:  XR ABDOMEN AP WITH LEFT DECUB    CLINICAL HISTORY:  Abdominal pain; Nausea    COMPARISON:  11/01/2020    FINDINGS:  There is persistent fairly diffuse colonic dilation with air-fluid levels which does not appear significantly changed since prior exam.  There may be a few loops of dilated small bowel.  No radiographic evidence of free air.  Sacral decubitus ulcers with underlying osteomyelitis is again noted.

## 2020-11-14 NOTE — SUBJECTIVE & OBJECTIVE
No current facility-administered medications on file prior to encounter.      Current Outpatient Medications on File Prior to Encounter   Medication Sig    ascorbic acid, vitamin C, (VITAMIN C) 500 MG tablet 1 tablet (500 mg total) by Per G Tube route once daily.    baclofen (LIORESAL) 10 MG tablet 1 tablet (10 mg total) by Per G Tube route 3 (three) times daily.    cefepime in dextrose 5 % (MAXIPIME) 2 gram/50 mL PgBk Inject 50 mLs (2 g total) into the vein every 8 (eight) hours.    gabapentin (NEURONTIN) 250 mg/5 mL solution 5 mLs (250 mg total) by Per G Tube route nightly. At bedtime    lactobacillus acidophilus & bulgar (LACTINEX) 100 million cell packet Take 1 tablet by mouth once daily.    melatonin (MELATIN) 3 mg tablet 2 tablets (6 mg total) by Per G Tube route nightly as needed for Insomnia.    [] metroNIDAZOLE (FLAGYL) 500 MG tablet 1 tablet (500 mg total) by Per G Tube route every 8 (eight) hours. for 10 days    multivitamin liquid no.118 Liqd 10 mLs by Per G Tube route once daily.    PHENobarbitaL 20 mg/5 mL (4 mg/mL) Elix elixir 25 mLs (100 mg total) by Per G Tube route every evening.    potassium chloride (KLOR-CON) 20 mEq Pack DISSOLVE 2 TABLETS AS DIRECTED AND DRINK DAILY    propranoloL (INDERAL) 20 MG tablet 1 tablet (20 mg total) by Per G Tube route 3 (three) times daily. (Patient taking differently: 20 mg by Per G Tube route 4 (four) times daily. )    vancomycin HCl (VANCOMYCIN 750 MG/250 ML D5W, READY TO MIX SYSTEM,) Inject 250 mLs (750 mg total) into the vein 2 (two) times a day.    acetaminophen (TYLENOL) 325 MG tablet Take 325 mg by mouth every 4 (four) hours as needed for Pain.    arginine-glutamine-calcium HMB (CHEO) 7-7-1.5 gram PwPk Take by mouth 2 (two) times a day.    bisacodyL (DULCOLAX) 10 mg Supp Place 1 suppository (10 mg total) rectally daily as needed (Until bowel movement if patient has no bowel movement for 2 days).    diphenhydrAMINE (BENADRYL) 12.5 mg/5  mL elixir 10 mLs (25 mg total) by Per G Tube route 4 (four) times daily as needed for Allergies.    miconazole nitrate 2% (MICOTIN) 2 % Oint Apply topically 2 (two) times daily. for 14 days    PHENobarbitaL 20 mg/5 mL (4 mg/mL) Elix elixir 25 mLs (100 mg total) by Per G Tube route every evening.       Review of patient's allergies indicates:   Allergen Reactions    Morphine sulfate Hives       Past Medical History:   Diagnosis Date    Acid reflux     Anemia of chronic disease 8/30/2020    Cerebral palsy     Colitis     Decubitus ulcer of ischial area, left, stage IV 9/10/2020    History of hip surgery     Osteomyelitis of pelvis 9/24/2020    Pneumoperitoneum 9/21/2020    Pressure injury of right ischium, stage 4     S/P percutaneous endoscopic gastrostomy (PEG) tube placement     Seizure disorder 8/26/2020    Seizures     Severe protein-calorie malnutrition     Sinus tachycardia 8/4/2020     Past Surgical History:   Procedure Laterality Date    CHOLECYSTECTOMY      FLEXIBLE SIGMOIDOSCOPY N/A 9/9/2020    Procedure: SIGMOIDOSCOPY, FLEXIBLE;  Surgeon: America Goode MD;  Location: [address]);  Service: Endoscopy;  Laterality: N/A;    HIP SURGERY      TRACHEOSTOMY N/A 10/27/2020    Procedure: CREATION, TRACHEOSTOMY;  Surgeon: Kar Palma MD;  Location: [facility];  Service: ENT;  Laterality: N/A;     Family History     Problem Relation (Age of Onset)    Cancer Maternal Grandfather        Tobacco Use    Smoking status: Never Smoker    Smokeless tobacco: Never Used   Substance and Sexual Activity    Alcohol use: Never     Frequency: Never    Drug use: Not Currently    Sexual activity: Never     Review of Systems   Unable to perform ROS: Patient nonverbal     Objective:     Vital Signs (Most Recent):  Temp: 97 °F (36.1 °C) (11/14/20 1218)  Pulse: 60 (11/14/20 1218)  Resp: 18 (11/14/20 1218)  BP: 122/82 (11/14/20 1218)  SpO2: 100 % (11/14/20 1218) Vital Signs (24h Range):  Temp:  [96.4 °F  (35.8 °C)-98.7 °F (37.1 °C)] 97 °F (36.1 °C)  Pulse:  [] 60  Resp:  [18-20] 18  SpO2:  [100 %] 100 %  BP: (122-150)/(80-95) 122/82     Weight: 41.5 kg (91 lb 7.9 oz)  Body mass index is 19.8 kg/m².    Physical Exam  Constitutional:       Appearance: He is not toxic-appearing.   Neck:      Comments: Trach in place  Pulmonary:      Effort: No respiratory distress.   Abdominal:      General: There is distension (mild).      Tenderness: There is no abdominal tenderness. There is no guarding or rebound.      Hernia: No hernia is present.      Comments: Ostomy with prolapse  Stool in appliance   Musculoskeletal:      Comments: contracture   Neurological:      Mental Status: He is alert.         Significant Labs:  CBC:   Recent Labs   Lab 11/14/20  0438   WBC 17.50*   RBC 3.41*   HGB 9.6*   HCT 31.6*   *   MCV 93   MCH 28.2   MCHC 30.4*     BMP:   Recent Labs   Lab 11/14/20  0438   GLU 88   *   K 4.1   CL >130*   CO2 13*   BUN 16   CREATININE 0.6   CALCIUM 8.3*   MG 2.3       Significant Diagnostics:  I have reviewed and interpreted all pertinent imaging results/findings within the past 24 hours. no pneumoperitoneum, no obstruction, prolapsed ostomy, diffusely dilated colon

## 2020-11-14 NOTE — CONSULTS
"  [facility]  Adult Nutrition  Consult Note    SUMMARY    Intervention: collaboration with care providers    Recommendation:   1) Advance TF as soon as medically able: Nutren 1.5 @ 20 ml/hr advancing to goal of 40 ml/hr + Gerald BID + 120 ml flush q 4 hr   ( provides 1440 kcal ( 99% EEN), 65 g protein ( 100% EPN + Gerald), 729 ml free water)     2) if unable to advance TF in < 4 days consider parenteral nutrition support   3) weigh pt weekly    Goals: 1) Nutrition support advanced in < 4 days  Nutrition Goal Status: new  Communication of RD Recs: (POC, sticky note)    Reason for Assessment    Reason For Assessment: consult  Diagnosis: (hypokalemia)  Relevant Medical History: spastic cerebral palsy, colitis, severe contractures, seizures, severe malmnutrition, colon perforation, GERD, + PEG/ colostomy/trach  Interdisciplinary Rounds: did not attend(remote consult)    General Information Comments: 26 y/o male admitted with hypokalemia and dehydration as well as pelvia osteomyelitis/anemia and multiple wounds. CT showes enlarged colon, may need surgical intervention, NPO for now except for home Gerald BID. NFPE not done d/t following remotely, on-site RD to perform at f/u. Wt gain per chart review. NPO x 1 day. TF last admit Nutren 1.5 @ 40 ml/hr.    Nutrition Discharge Planning: to be determined- TF above    Nutrition Risk Screen    Nutrition Risk Screen: large or nonhealing wound, burn or pressure injury, tube feeding or parenteral nutrition    Nutrition/Diet History    Patient Reported Diet/Restrictions/Preferences: no oral intake  Spiritual, Cultural Beliefs, Hindu Practices, Values that Affect Care: no  Supplemental Drinks or Food Habits: Gerald  Food Allergies: NKFA  Factors Affecting Nutritional Intake: altered gastrointestinal function, NPO, difficulty/impaired swallowing    Anthropometrics    Temp: 96.4 °F (35.8 °C)  Height Method: Measured(surgery 10/27/20)  Height: 4' 9"  Height (inches): 57 " in  Weight Method: Bed Scale  Weight: 41.5 kg (91 lb 7.9 oz)  Weight (lb): 91.49 lb  Ideal Body Weight (IBW), Male: 88 lb  % Ideal Body Weight, Male (lb): 103.97 %  BMI (Calculated): 19.8  BMI Grade: 18.5-24.9 - normal  Usual Body Weight (UBW), k.3 kg(10/27/20)  Weight Change Amount: (35-38 kg -8/10/20, 45.3 kg 10/15/19)  % Usual Body Weight: 121.24  % Weight Change From Usual Weight: 20.99 %       Lab/Procedures/Meds    Pertinent Labs Reviewed: reviewed  BMP  Lab Results   Component Value Date     (H) 2020    K 4.1 2020    CL >130 (HH) 2020    CO2 13 (L) 2020    BUN 16 2020    CREATININE 0.6 2020    CALCIUM 8.3 (L) 2020    ANIONGAP Unable to calculate 2020    ESTGFRAFRICA >60 2020    EGFRNONAA >60 2020     Lab Results   Component Value Date    ALBUMIN 1.9 (L) 2020       Pertinent Medications Reviewed: reviewed  Pertinent Medications Comments: Vitamin C, probiotic, MVI, iron, KCl, D 5 @ 100 ml/hr    Estimated/Assessed Needs    Weight Used For Calorie Calculations: 41.5 kg (91 lb 7.9 oz)  Energy Calorie Requirements (kcal): 30-35 kcal/kg ( wt gain/maintenance and spastic cerebral palsy) = 1658-6054 kcal  Energy Need Method: Kcal/kg  Protein Requirements: 1.5 g protein/kg ( 62 g protein, multiple wounds)  Weight Used For Protein Calculations: 41.5 kg (91 lb 7.9 oz)  Fluid Requirements (mL): 1500 ml or per MD  Estimated Fluid Requirement Method: RDA Method  CHO Requirement: N/A      Nutrition Prescription Ordered    Current Diet Order: NPO x 1 day    Evaluation of Received Nutrient/Fluid Intake    Energy Calories Required: not meeting needs  Protein Required: not meeting needs  Fluid Required: not meeting needs  Tolerance: other (see comments)(NPO)  % Intake of Estimated Energy Needs: 28% D 5  % Meal Intake: 0%    Nutrition Risk    Level of Risk/Frequency of Follow-up: moderate 2 x weekly    Assessment and Plan    Moderate protein-calorie  malnutrition  Contributing Nutrition Diagnosis  Moderate chronic condition related malnutrition    Related to (etiology):   Inadequate protein/energy intake, increased needs d/t spastic cerebral palsy  , difficulty swallowing    Signs and Symptoms (as evidenced by):   1) 25% wt loss x 1 year ( 10/2019--10/2020)  2) multiple PIs  3) mild-moderate fat/muscle wasting ( 10/30/20)    Interventions/Recommendations (treatment strategy):  above    Nutrition Diagnosis Status:   improving           Monitor and Evaluation    Food and Nutrient Intake: energy intake  Food and Nutrient Adminstration: enteral and parenteral nutrition administration  Anthropometric Measurements: weight  Biochemical Data, Medical Tests and Procedures: electrolyte and renal panel, gastrointestinal profile, glucose/endocrine profile  Nutrition-Focused Physical Findings: overall appearance, skin     Malnutrition Assessment  Malnutrition Type: chronic illness  Skin (Micronutrient): (Jerry = 9, multiple PIs: stage 4 ischial tuberosity, stage 3 sacral, starge 2 thigh, ear/wrist wounds, neck and abd. incisions, colostomy/PEG)  Teeth (Micronutrient): (dry tongue)   Micronutrient Evaluation: suspected deficiency, suspected toxicity  Micronutrient Evaluation Comments: K, Na/Cl toxicity               Edema (Fluid Accumulation): 0-->no edema present             Nutrition Follow-Up    RD Follow-up?: Yes

## 2020-11-14 NOTE — NURSING
Patient received from er via stretcher,  In no acute distress.  Report received from Ilene. Continuous telemetry monitoring initiated, and patient noted to be in sinus tach with HR = 106 , Vitals taken and assessment completed.  Patient oriented to room, given and instructed on call light system, fall precautions, hourly rounding. Avasys initiated. Bed low and locked and alarm on.  Please see assessment flowsheet for further interventions.  Lesly Herrera RN

## 2020-11-14 NOTE — ASSESSMENT & PLAN NOTE
Edematous and with clear liquid output per John  General surgery consulted  Holding tube feedings for now in case of surgical intervention

## 2020-11-14 NOTE — ASSESSMENT & PLAN NOTE
Likely d/t insufficient intake  K+ at <2.0 in ED  Replaced via IV and peg  Recheck in AM  Replete as needed  Continuous cardiac monitoring  Home dose adjustment at discharge

## 2020-11-15 LAB
BACTERIA UR CULT: NORMAL
BACTERIA UR CULT: NORMAL

## 2020-11-15 NOTE — NURSING
Discharge instructions reviewed with nurse at [place], including discharge medications, follow-up appointments, and diet.  Discharged back to [facility] via stretcher by [facility]  in no acute distress. Catheter, wound vac and tunneled central line in place during discharge.  Lesly Herrera RN  11/14/2020

## 2020-11-16 LAB
BACTERIA BLD CULT: ABNORMAL

## 2020-11-19 LAB — BACTERIA BLD CULT: NORMAL

## 2020-12-06 ENCOUNTER — HOSPITAL ENCOUNTER (INPATIENT)
Facility: HOSPITAL | Age: 25
LOS: 4 days | Discharge: NURSING, REHAB OR LONG-TERM CARE | DRG: 329 | End: 2020-12-10
Attending: EMERGENCY MEDICINE | Admitting: INTERNAL MEDICINE
Payer: MEDICAID

## 2020-12-06 DIAGNOSIS — D72.829 LEUKOCYTOSIS, UNSPECIFIED TYPE: ICD-10-CM

## 2020-12-06 DIAGNOSIS — R00.0 TACHYCARDIA: ICD-10-CM

## 2020-12-06 DIAGNOSIS — K94.09 COLOSTOMY PROLAPSE: Primary | ICD-10-CM

## 2020-12-06 DIAGNOSIS — Z93.3 STATUS POST COLOSTOMY: ICD-10-CM

## 2020-12-06 DIAGNOSIS — D64.9 ANEMIA, UNSPECIFIED TYPE: ICD-10-CM

## 2020-12-06 DIAGNOSIS — L89.154 PRESSURE INJURY OF SACRAL REGION, STAGE 4: ICD-10-CM

## 2020-12-06 PROBLEM — N39.0 UTI (URINARY TRACT INFECTION): Status: ACTIVE | Noted: 2020-12-06

## 2020-12-06 LAB
ABO + RH BLD: NORMAL
ALBUMIN SERPL BCP-MCNC: 1.5 G/DL (ref 3.5–5.2)
ALP SERPL-CCNC: 98 U/L (ref 55–135)
ALT SERPL W/O P-5'-P-CCNC: 6 U/L (ref 10–44)
ANION GAP SERPL CALC-SCNC: 10 MMOL/L (ref 8–16)
ANION GAP SERPL CALC-SCNC: 6 MMOL/L (ref 8–16)
APTT BLDCRRT: 30.3 SEC (ref 21–32)
AST SERPL-CCNC: 15 U/L (ref 10–40)
BACTERIA #/AREA URNS HPF: ABNORMAL /HPF
BASOPHILS # BLD AUTO: 0.02 K/UL (ref 0–0.2)
BASOPHILS NFR BLD: 0.1 % (ref 0–1.9)
BILIRUB SERPL-MCNC: 0.1 MG/DL (ref 0.1–1)
BILIRUB UR QL STRIP: NEGATIVE
BLD GP AB SCN CELLS X3 SERPL QL: NORMAL
BLD PROD TYP BPU: NORMAL
BLOOD UNIT EXPIRATION DATE: NORMAL
BLOOD UNIT TYPE CODE: 600
BLOOD UNIT TYPE: NORMAL
BUN SERPL-MCNC: 39 MG/DL (ref 6–20)
BUN SERPL-MCNC: 42 MG/DL (ref 6–20)
CALCIUM SERPL-MCNC: 8.1 MG/DL (ref 8.7–10.5)
CALCIUM SERPL-MCNC: 8.3 MG/DL (ref 8.7–10.5)
CHLORIDE SERPL-SCNC: 116 MMOL/L (ref 95–110)
CHLORIDE SERPL-SCNC: 118 MMOL/L (ref 95–110)
CLARITY UR: CLEAR
CO2 SERPL-SCNC: 13 MMOL/L (ref 23–29)
CO2 SERPL-SCNC: 13 MMOL/L (ref 23–29)
CODING SYSTEM: NORMAL
COLOR UR: YELLOW
CREAT SERPL-MCNC: 0.6 MG/DL (ref 0.5–1.4)
CREAT SERPL-MCNC: 0.6 MG/DL (ref 0.5–1.4)
DIFFERENTIAL METHOD: ABNORMAL
DISPENSE STATUS: NORMAL
EOSINOPHIL # BLD AUTO: 0.4 K/UL (ref 0–0.5)
EOSINOPHIL NFR BLD: 2.6 % (ref 0–8)
ERYTHROCYTE [DISTWIDTH] IN BLOOD BY AUTOMATED COUNT: 21.2 % (ref 11.5–14.5)
EST. GFR  (AFRICAN AMERICAN): >60 ML/MIN/1.73 M^2
EST. GFR  (AFRICAN AMERICAN): >60 ML/MIN/1.73 M^2
EST. GFR  (NON AFRICAN AMERICAN): >60 ML/MIN/1.73 M^2
EST. GFR  (NON AFRICAN AMERICAN): >60 ML/MIN/1.73 M^2
GLUCOSE SERPL-MCNC: 79 MG/DL (ref 70–110)
GLUCOSE SERPL-MCNC: 97 MG/DL (ref 70–110)
GLUCOSE UR QL STRIP: NEGATIVE
HCT VFR BLD AUTO: 23.6 % (ref 40–54)
HGB BLD-MCNC: 6.9 G/DL (ref 14–18)
HGB UR QL STRIP: ABNORMAL
HYALINE CASTS #/AREA URNS LPF: 0 /LPF
IMM GRANULOCYTES # BLD AUTO: 0.09 K/UL (ref 0–0.04)
IMM GRANULOCYTES NFR BLD AUTO: 0.6 % (ref 0–0.5)
INR PPP: 1.1 (ref 0.8–1.2)
KETONES UR QL STRIP: NEGATIVE
LACTATE SERPL-SCNC: 1.1 MMOL/L (ref 0.5–2.2)
LACTATE SERPL-SCNC: 1.5 MMOL/L (ref 0.5–2.2)
LEUKOCYTE ESTERASE UR QL STRIP: ABNORMAL
LYMPHOCYTES # BLD AUTO: 1.4 K/UL (ref 1–4.8)
LYMPHOCYTES NFR BLD: 8.9 % (ref 18–48)
MCH RBC QN AUTO: 27.8 PG (ref 27–31)
MCHC RBC AUTO-ENTMCNC: 29.2 G/DL (ref 32–36)
MCV RBC AUTO: 95 FL (ref 82–98)
MICROSCOPIC COMMENT: ABNORMAL
MONOCYTES # BLD AUTO: 1.8 K/UL (ref 0.3–1)
MONOCYTES NFR BLD: 11.5 % (ref 4–15)
NEUTROPHILS # BLD AUTO: 11.9 K/UL (ref 1.8–7.7)
NEUTROPHILS NFR BLD: 76.3 % (ref 38–73)
NITRITE UR QL STRIP: NEGATIVE
NRBC BLD-RTO: 0 /100 WBC
NUM UNITS TRANS PACKED RBC: NORMAL
PH UR STRIP: 6 [PH] (ref 5–8)
PLATELET # BLD AUTO: 661 K/UL (ref 150–350)
PMV BLD AUTO: 9.1 FL (ref 9.2–12.9)
POTASSIUM SERPL-SCNC: 3.6 MMOL/L (ref 3.5–5.1)
POTASSIUM SERPL-SCNC: 4.9 MMOL/L (ref 3.5–5.1)
PROT SERPL-MCNC: 6.2 G/DL (ref 6–8.4)
PROT UR QL STRIP: ABNORMAL
PROTHROMBIN TIME: 12 SEC (ref 9–12.5)
RBC # BLD AUTO: 2.48 M/UL (ref 4.6–6.2)
RBC #/AREA URNS HPF: >100 /HPF (ref 0–4)
RETICS/RBC NFR AUTO: 4.5 % (ref 0.4–2)
SARS-COV-2 RDRP RESP QL NAA+PROBE: NEGATIVE
SODIUM SERPL-SCNC: 137 MMOL/L (ref 136–145)
SODIUM SERPL-SCNC: 139 MMOL/L (ref 136–145)
SP GR UR STRIP: 1.02 (ref 1–1.03)
URN SPEC COLLECT METH UR: ABNORMAL
UROBILINOGEN UR STRIP-ACNC: NEGATIVE EU/DL
WBC # BLD AUTO: 15.59 K/UL (ref 3.9–12.7)
WBC #/AREA URNS HPF: 65 /HPF (ref 0–5)

## 2020-12-06 PROCEDURE — 96376 TX/PRO/DX INJ SAME DRUG ADON: CPT

## 2020-12-06 PROCEDURE — 96375 TX/PRO/DX INJ NEW DRUG ADDON: CPT

## 2020-12-06 PROCEDURE — 63600175 PHARM REV CODE 636 W HCPCS: Performed by: EMERGENCY MEDICINE

## 2020-12-06 PROCEDURE — 25000003 PHARM REV CODE 250: Performed by: COLON & RECTAL SURGERY

## 2020-12-06 PROCEDURE — S0030 INJECTION, METRONIDAZOLE: HCPCS | Performed by: FAMILY MEDICINE

## 2020-12-06 PROCEDURE — 85045 AUTOMATED RETICULOCYTE COUNT: CPT

## 2020-12-06 PROCEDURE — 99900026 HC AIRWAY MAINTENANCE (STAT)

## 2020-12-06 PROCEDURE — 82728 ASSAY OF FERRITIN: CPT

## 2020-12-06 PROCEDURE — 87086 URINE CULTURE/COLONY COUNT: CPT

## 2020-12-06 PROCEDURE — G0378 HOSPITAL OBSERVATION PER HR: HCPCS

## 2020-12-06 PROCEDURE — 25000003 PHARM REV CODE 250: Performed by: NURSE PRACTITIONER

## 2020-12-06 PROCEDURE — A4217 STERILE WATER/SALINE, 500 ML: HCPCS | Performed by: FAMILY MEDICINE

## 2020-12-06 PROCEDURE — 85025 COMPLETE CBC W/AUTO DIFF WBC: CPT

## 2020-12-06 PROCEDURE — 63600175 PHARM REV CODE 636 W HCPCS: Performed by: COLON & RECTAL SURGERY

## 2020-12-06 PROCEDURE — 63600175 PHARM REV CODE 636 W HCPCS: Performed by: FAMILY MEDICINE

## 2020-12-06 PROCEDURE — 93010 ELECTROCARDIOGRAM REPORT: CPT | Mod: ,,, | Performed by: INTERNAL MEDICINE

## 2020-12-06 PROCEDURE — 80048 BASIC METABOLIC PNL TOTAL CA: CPT

## 2020-12-06 PROCEDURE — 85610 PROTHROMBIN TIME: CPT

## 2020-12-06 PROCEDURE — 86850 RBC ANTIBODY SCREEN: CPT

## 2020-12-06 PROCEDURE — 85730 THROMBOPLASTIN TIME PARTIAL: CPT

## 2020-12-06 PROCEDURE — 11000001 HC ACUTE MED/SURG PRIVATE ROOM

## 2020-12-06 PROCEDURE — 36415 COLL VENOUS BLD VENIPUNCTURE: CPT

## 2020-12-06 PROCEDURE — 83540 ASSAY OF IRON: CPT

## 2020-12-06 PROCEDURE — P9016 RBC LEUKOCYTES REDUCED: HCPCS

## 2020-12-06 PROCEDURE — 80053 COMPREHEN METABOLIC PANEL: CPT

## 2020-12-06 PROCEDURE — A4217 STERILE WATER/SALINE, 500 ML: HCPCS | Performed by: NURSE PRACTITIONER

## 2020-12-06 PROCEDURE — 81000 URINALYSIS NONAUTO W/SCOPE: CPT

## 2020-12-06 PROCEDURE — 25000003 PHARM REV CODE 250: Performed by: FAMILY MEDICINE

## 2020-12-06 PROCEDURE — 99232 SBSQ HOSP IP/OBS MODERATE 35: CPT | Mod: ,,, | Performed by: SURGERY

## 2020-12-06 PROCEDURE — 87088 URINE BACTERIA CULTURE: CPT

## 2020-12-06 PROCEDURE — 86920 COMPATIBILITY TEST SPIN: CPT

## 2020-12-06 PROCEDURE — 82607 VITAMIN B-12: CPT

## 2020-12-06 PROCEDURE — U0002 COVID-19 LAB TEST NON-CDC: HCPCS

## 2020-12-06 PROCEDURE — 93010 EKG 12-LEAD: ICD-10-PCS | Mod: ,,, | Performed by: INTERNAL MEDICINE

## 2020-12-06 PROCEDURE — 83605 ASSAY OF LACTIC ACID: CPT

## 2020-12-06 PROCEDURE — S0030 INJECTION, METRONIDAZOLE: HCPCS | Performed by: COLON & RECTAL SURGERY

## 2020-12-06 PROCEDURE — 99291 CRITICAL CARE FIRST HOUR: CPT | Mod: 25

## 2020-12-06 PROCEDURE — 96365 THER/PROPH/DIAG IV INF INIT: CPT

## 2020-12-06 PROCEDURE — 99232 PR SUBSEQUENT HOSPITAL CARE,LEVL II: ICD-10-PCS | Mod: ,,, | Performed by: SURGERY

## 2020-12-06 PROCEDURE — 87040 BLOOD CULTURE FOR BACTERIA: CPT | Mod: 59

## 2020-12-06 PROCEDURE — 82746 ASSAY OF FOLIC ACID SERUM: CPT

## 2020-12-06 PROCEDURE — 87106 FUNGI IDENTIFICATION YEAST: CPT

## 2020-12-06 PROCEDURE — 93005 ELECTROCARDIOGRAM TRACING: CPT

## 2020-12-06 PROCEDURE — 25000003 PHARM REV CODE 250: Performed by: EMERGENCY MEDICINE

## 2020-12-06 PROCEDURE — 99900035 HC TECH TIME PER 15 MIN (STAT)

## 2020-12-06 PROCEDURE — 83605 ASSAY OF LACTIC ACID: CPT | Mod: 91

## 2020-12-06 PROCEDURE — 94761 N-INVAS EAR/PLS OXIMETRY MLT: CPT

## 2020-12-06 PROCEDURE — 27000221 HC OXYGEN, UP TO 24 HOURS

## 2020-12-06 RX ORDER — LINEZOLID 2 MG/ML
600 INJECTION, SOLUTION INTRAVENOUS
Status: COMPLETED | OUTPATIENT
Start: 2020-12-06 | End: 2020-12-06

## 2020-12-06 RX ORDER — LORAZEPAM 2 MG/ML
2 INJECTION INTRAMUSCULAR
Status: DISCONTINUED | OUTPATIENT
Start: 2020-12-06 | End: 2020-12-10 | Stop reason: HOSPADM

## 2020-12-06 RX ORDER — GABAPENTIN 250 MG/5ML
250 SOLUTION ORAL NIGHTLY
Status: DISCONTINUED | OUTPATIENT
Start: 2020-12-06 | End: 2020-12-10 | Stop reason: HOSPADM

## 2020-12-06 RX ORDER — PHENOBARBITAL 20 MG/5ML
100 ELIXIR ORAL NIGHTLY
Status: DISCONTINUED | OUTPATIENT
Start: 2020-12-06 | End: 2020-12-10 | Stop reason: HOSPADM

## 2020-12-06 RX ORDER — CEFEPIME HYDROCHLORIDE 1 G/50ML
2 INJECTION, SOLUTION INTRAVENOUS
Status: COMPLETED | OUTPATIENT
Start: 2020-12-06 | End: 2020-12-06

## 2020-12-06 RX ORDER — HYDROCODONE BITARTRATE AND ACETAMINOPHEN 500; 5 MG/1; MG/1
TABLET ORAL
Status: DISCONTINUED | OUTPATIENT
Start: 2020-12-06 | End: 2020-12-10 | Stop reason: HOSPADM

## 2020-12-06 RX ORDER — METRONIDAZOLE 500 MG/100ML
500 INJECTION, SOLUTION INTRAVENOUS
Status: DISCONTINUED | OUTPATIENT
Start: 2020-12-06 | End: 2020-12-06

## 2020-12-06 RX ORDER — METRONIDAZOLE 500 MG/100ML
500 INJECTION, SOLUTION INTRAVENOUS
Status: DISCONTINUED | OUTPATIENT
Start: 2020-12-06 | End: 2020-12-10 | Stop reason: HOSPADM

## 2020-12-06 RX ORDER — PROPRANOLOL HYDROCHLORIDE 20 MG/1
20 TABLET ORAL 3 TIMES DAILY
Status: DISCONTINUED | OUTPATIENT
Start: 2020-12-06 | End: 2020-12-10 | Stop reason: HOSPADM

## 2020-12-06 RX ORDER — CEFEPIME HYDROCHLORIDE 1 G/50ML
2 INJECTION, SOLUTION INTRAVENOUS
Status: DISCONTINUED | OUTPATIENT
Start: 2020-12-06 | End: 2020-12-10 | Stop reason: HOSPADM

## 2020-12-06 RX ORDER — TALC
6 POWDER (GRAM) TOPICAL NIGHTLY PRN
Status: DISCONTINUED | OUTPATIENT
Start: 2020-12-06 | End: 2020-12-10 | Stop reason: HOSPADM

## 2020-12-06 RX ORDER — ACETAMINOPHEN 650 MG/20.3ML
500 LIQUID ORAL
Status: COMPLETED | OUTPATIENT
Start: 2020-12-06 | End: 2020-12-06

## 2020-12-06 RX ORDER — BACLOFEN 10 MG/1
10 TABLET ORAL 3 TIMES DAILY
Status: DISCONTINUED | OUTPATIENT
Start: 2020-12-06 | End: 2020-12-10 | Stop reason: HOSPADM

## 2020-12-06 RX ORDER — LINEZOLID 2 MG/ML
600 INJECTION, SOLUTION INTRAVENOUS
Status: DISCONTINUED | OUTPATIENT
Start: 2020-12-07 | End: 2020-12-10 | Stop reason: HOSPADM

## 2020-12-06 RX ORDER — GABAPENTIN 100 MG/1
200 CAPSULE ORAL NIGHTLY
Status: DISCONTINUED | OUTPATIENT
Start: 2020-12-06 | End: 2020-12-06

## 2020-12-06 RX ADMIN — CEFEPIME HYDROCHLORIDE 2 G: 2 INJECTION, SOLUTION INTRAVENOUS at 10:12

## 2020-12-06 RX ADMIN — GABAPENTIN 250 MG: 250 SUSPENSION ORAL at 10:12

## 2020-12-06 RX ADMIN — CEFEPIME HYDROCHLORIDE 2 G: 2 INJECTION, SOLUTION INTRAVENOUS at 02:12

## 2020-12-06 RX ADMIN — SODIUM BICARBONATE: 84 INJECTION, SOLUTION INTRAVENOUS at 11:12

## 2020-12-06 RX ADMIN — PHENOBARBITAL 100 MG: 20 ELIXIR ORAL at 11:12

## 2020-12-06 RX ADMIN — METRONIDAZOLE 500 MG: 500 INJECTION, SOLUTION INTRAVENOUS at 03:12

## 2020-12-06 RX ADMIN — PROPRANOLOL HYDROCHLORIDE 20 MG: 20 TABLET ORAL at 10:12

## 2020-12-06 RX ADMIN — SODIUM CHLORIDE, SODIUM LACTATE, POTASSIUM CHLORIDE, AND CALCIUM CHLORIDE 1080 ML: .6; .31; .03; .02 INJECTION, SOLUTION INTRAVENOUS at 02:12

## 2020-12-06 RX ADMIN — LINEZOLID 600 MG: 600 INJECTION, SOLUTION INTRAVENOUS at 03:12

## 2020-12-06 RX ADMIN — METRONIDAZOLE 500 MG: 500 INJECTION, SOLUTION INTRAVENOUS at 11:12

## 2020-12-06 RX ADMIN — BACLOFEN 10 MG: 10 TABLET ORAL at 10:12

## 2020-12-06 RX ADMIN — ACETAMINOPHEN 499.51 MG: 650 SOLUTION ORAL at 02:12

## 2020-12-06 NOTE — HPI
24yo M with CP and extensive osteomyelitis with decubitus ulcer who is s/p lap loop sigmoid colostomy construction on 10/29/2020 for fecal diversion. Also s/p trach/PEG. Represented to hospital with colostomy prolapse. Iglesias placed into stoma for decompression which released large amount of gas/stool. Surgery consulted for evaluation   Alert and oriented to person, place, time/situation. normal mood and affect.

## 2020-12-06 NOTE — SUBJECTIVE & OBJECTIVE
Past Medical History:   Diagnosis Date    Acid reflux     Anemia of chronic disease 8/30/2020    Cerebral palsy     Colitis     Decubitus ulcer of ischial area, left, stage IV 9/10/2020    History of hip surgery     Osteomyelitis of pelvis 9/24/2020    Pneumoperitoneum 9/21/2020    Pressure injury of right ischium, stage 4     S/P percutaneous endoscopic gastrostomy (PEG) tube placement     Seizure disorder 8/26/2020    Seizures     Severe protein-calorie malnutrition     Sinus tachycardia 8/4/2020       Past Surgical History:   Procedure Laterality Date    CHOLECYSTECTOMY      FLEXIBLE SIGMOIDOSCOPY N/A 9/9/2020    Procedure: SIGMOIDOSCOPY, FLEXIBLE;  Surgeon: America Goode MD;  Location: [facility] ENDO [address]);  Service: Endoscopy;  Laterality: N/A;    HIP SURGERY      TRACHEOSTOMY N/A 10/27/2020    Procedure: CREATION, TRACHEOSTOMY;  Surgeon: Kar Palma MD;  Location: [facility];  Service: ENT;  Laterality: N/A;       Review of patient's allergies indicates:   Allergen Reactions    Morphine sulfate Hives       No current facility-administered medications on file prior to encounter.      Current Outpatient Medications on File Prior to Encounter   Medication Sig    acetaminophen (TYLENOL) 325 MG tablet Take 325 mg by mouth every 4 (four) hours as needed for Pain.    arginine-glutamine-calcium HMB (CHEO) 7-7-1.5 gram PwPk Take by mouth 2 (two) times a day.    ascorbic acid, vitamin C, (VITAMIN C) 500 MG tablet 1 tablet (500 mg total) by Per G Tube route once daily.    baclofen (LIORESAL) 10 MG tablet 1 tablet (10 mg total) by Per G Tube route 3 (three) times daily.    bisacodyL (DULCOLAX) 10 mg Supp Place 1 suppository (10 mg total) rectally daily as needed (Until bowel movement if patient has no bowel movement for 2 days).    diphenhydrAMINE (BENADRYL) 12.5 mg/5 mL elixir 10 mLs (25 mg total) by Per G Tube route 4 (four) times daily as needed for Allergies.    gabapentin (NEURONTIN) 250 mg/5  mL solution 5 mLs (250 mg total) by Per G Tube route nightly. At bedtime    lactobacillus acidophilus & bulgar (LACTINEX) 100 million cell packet Take 1 tablet by mouth once daily.    melatonin (MELATIN) 3 mg tablet 2 tablets (6 mg total) by Per G Tube route nightly as needed for Insomnia.    miconazole nitrate 2% (MICOTIN) 2 % Oint Apply topically 2 (two) times daily. for 14 days    multivitamin liquid no.118 Liqd 10 mLs by Per G Tube route once daily.    PHENobarbitaL 20 mg/5 mL (4 mg/mL) Elix elixir 25 mLs (100 mg total) by Per G Tube route every evening.    potassium chloride (KLOR-CON) 20 mEq Pack DISSOLVE 2 TABLETS AS DIRECTED AND DRINK DAILY    propranoloL (INDERAL) 20 MG tablet 1 tablet (20 mg total) by Per G Tube route 3 (three) times daily. (Patient taking differently: 20 mg by Per G Tube route 4 (four) times daily. )     Family History     Problem Relation (Age of Onset)    Cancer Maternal Grandfather        Tobacco Use    Smoking status: Never Smoker    Smokeless tobacco: Never Used   Substance and Sexual Activity    Alcohol use: Never     Frequency: Never    Drug use: Not Currently    Sexual activity: Never     Review of Systems   Unable to perform ROS: Mental status change     Objective:     Vital Signs (Most Recent):  Temp: 100.2 °F (37.9 °C) (12/06/20 1210)  Pulse: 98 (12/06/20 1403)  Resp: (!) 22 (12/06/20 1403)  BP: 119/85 (12/06/20 1403)  SpO2: 100 % (12/06/20 1403) Vital Signs (24h Range):  Temp:  [100.2 °F (37.9 °C)] 100.2 °F (37.9 °C)  Pulse:  [] 98  Resp:  [18-26] 22  SpO2:  [99 %-100 %] 100 %  BP: (105-137)/(59-93) 119/85     Weight: 32.2 kg (70 lb 15.8 oz)  Body mass index is 15.36 kg/m².    Physical Exam  Constitutional:       General: He is not in acute distress.     Appearance: He is well-developed. He is not diaphoretic.      Comments: Thin   HENT:      Head: Normocephalic and atraumatic.   Eyes:      Pupils: Pupils are equal, round, and reactive to light.    Cardiovascular:      Rate and Rhythm: Normal rate and regular rhythm.      Heart sounds: Normal heart sounds. No murmur. No friction rub. No gallop.    Pulmonary:      Effort: Pulmonary effort is normal. No respiratory distress.      Breath sounds: Normal breath sounds. No stridor. No wheezing or rales.   Abdominal:      General: Bowel sounds are normal. There is distension.      Palpations: Abdomen is soft. There is no mass.      Tenderness: There is no abdominal tenderness. There is no guarding.      Comments: Colostomy noted, rizvi cath in colostomy opening     Musculoskeletal:      Comments: Contracted extremities   Skin:     General: Skin is warm.      Findings: No erythema.   Neurological:      Mental Status: Mental status is at baseline.           CRANIAL NERVES     CN III, IV, VI   Pupils are equal, round, and reactive to light.       Significant Labs:   Results for orders placed or performed during the hospital encounter of 12/06/20   CBC Auto Differential   Result Value Ref Range    WBC 15.59 (H) 3.90 - 12.70 K/uL    RBC 2.48 (L) 4.60 - 6.20 M/uL    Hemoglobin 6.9 (L) 14.0 - 18.0 g/dL    Hematocrit 23.6 (L) 40.0 - 54.0 %    MCV 95 82 - 98 fL    MCH 27.8 27.0 - 31.0 pg    MCHC 29.2 (L) 32.0 - 36.0 g/dL    RDW 21.2 (H) 11.5 - 14.5 %    Platelets 661 (H) 150 - 350 K/uL    MPV 9.1 (L) 9.2 - 12.9 fL    Immature Granulocytes 0.6 (H) 0.0 - 0.5 %    Gran # (ANC) 11.9 (H) 1.8 - 7.7 K/uL    Immature Grans (Abs) 0.09 (H) 0.00 - 0.04 K/uL    Lymph # 1.4 1.0 - 4.8 K/uL    Mono # 1.8 (H) 0.3 - 1.0 K/uL    Eos # 0.4 0.0 - 0.5 K/uL    Baso # 0.02 0.00 - 0.20 K/uL    nRBC 0 0 /100 WBC    Gran % 76.3 (H) 38.0 - 73.0 %    Lymph % 8.9 (L) 18.0 - 48.0 %    Mono % 11.5 4.0 - 15.0 %    Eosinophil % 2.6 0.0 - 8.0 %    Basophil % 0.1 0.0 - 1.9 %    Differential Method Automated    Comprehensive Metabolic Panel   Result Value Ref Range    Sodium 137 136 - 145 mmol/L    Potassium 4.9 3.5 - 5.1 mmol/L    Chloride 118 (H) 95 - 110  mmol/L    CO2 13 (L) 23 - 29 mmol/L    Glucose 97 70 - 110 mg/dL    BUN 42 (H) 6 - 20 mg/dL    Creatinine 0.6 0.5 - 1.4 mg/dL    Calcium 8.1 (L) 8.7 - 10.5 mg/dL    Total Protein 6.2 6.0 - 8.4 g/dL    Albumin 1.5 (L) 3.5 - 5.2 g/dL    Total Bilirubin 0.1 0.1 - 1.0 mg/dL    Alkaline Phosphatase 98 55 - 135 U/L    AST 15 10 - 40 U/L    ALT 6 (L) 10 - 44 U/L    Anion Gap 6 (L) 8 - 16 mmol/L    eGFR if African American >60 >60 mL/min/1.73 m^2    eGFR if non African American >60 >60 mL/min/1.73 m^2   Urinalysis, Reflex to Urine Culture Urine, Clean Catch    Specimen: Urine   Result Value Ref Range    Specimen UA Urine, Catheterized     Color, UA Yellow Yellow, Straw, Vane    Appearance, UA Clear Clear    pH, UA 6.0 5.0 - 8.0    Specific Gravity, UA 1.020 1.005 - 1.030    Protein, UA 1+ (A) Negative    Glucose, UA Negative Negative    Ketones, UA Negative Negative    Bilirubin (UA) Negative Negative    Occult Blood UA 3+ (A) Negative    Nitrite, UA Negative Negative    Urobilinogen, UA Negative <2.0 EU/dL    Leukocytes, UA 2+ (A) Negative   COVID-19 Rapid Screening   Result Value Ref Range    SARS-CoV-2 RNA, Amplification, Qual Negative Negative   Lactic acid, plasma #1   Result Value Ref Range    Lactate (Lactic Acid) 1.1 0.5 - 2.2 mmol/L   Urinalysis Microscopic   Result Value Ref Range    RBC, UA >100 (H) 0 - 4 /hpf    WBC, UA 65 (H) 0 - 5 /hpf    Bacteria Few (A) None-Occ /hpf    Hyaline Casts, UA 0 0-1/lpf /lpf    Microscopic Comment SEE COMMENT    Type & Screen   Result Value Ref Range    Group & Rh A NEG     Indirect Tao NEG    Prepare RBC 1 Unit   Result Value Ref Range    UNIT NUMBER [id number]     Product Code [id number]     DISPENSE STATUS CROSSMATCHED     CODING SYSTEM [id number]     Unit Blood Type Code 0600     Unit Blood Type A NEG     Unit Expiration [id number]         Significant Imaging: I have reviewed all pertinent imaging results/findings within the past 24 hours.

## 2020-12-06 NOTE — H&P
[facility] Hospital Medicine  History & Physical    Patient Name: Glen Moscoso  MRN: [id number]  Admission Date: 2020  Attending Physician: Drew Carroll MD  Primary Care Provider: Yadi Lawson MD         Patient information was obtained from ER records.     Subjective:     Principal Problem:Status post colostomy    Chief Complaint:   Chief Complaint   Patient presents with    abdominal issues     +colostomy draining a lot of liquid stool +lg intestine pushing thru into ostomy bag +abdominal distention +drainage around PEG +pt has bilat decub ulcers +trach on 6L +recent sepsis & resp failure        HPI: Patient is a 24 y/o  male with a PMH of seizures, colitis, acid reflux, severe PCM, Peg tube, pelvic osteo, cerebral palsy, anemia, proctocolitis to ED from [facility] due to malfunctioning colostomy. Patient was at [facility] completing abx course. He was discharged on . Per chart review patient grew VRE on last blood cultures. Unable to obtain history as patient is non-verbal without any family members at bedside. Patient presented for worsening abdominal distention and concern for colostomy malfunction. Surgery was consulted in ED and rizvi catheter was placed in sigmoid colostomy for decompression of the bowel. Lab work on arrival concerning for leukocytosis, hb.9, along with metabolic acidosis. Patient was bolused 30 cc/kg of LR and started on zyvox, cefepime, and flagyl. Patient was placed in observation.     Past Medical History:   Diagnosis Date    Acid reflux     Anemia of chronic disease 2020    Cerebral palsy     Colitis     Decubitus ulcer of ischial area, left, stage IV 9/10/2020    History of hip surgery     Osteomyelitis of pelvis 2020    Pneumoperitoneum 2020    Pressure injury of right ischium, stage 4     S/P percutaneous endoscopic gastrostomy (PEG) tube placement     Seizure disorder 2020    Seizures     Severe  protein-calorie malnutrition     Sinus tachycardia 8/4/2020       Past Surgical History:   Procedure Laterality Date    CHOLECYSTECTOMY      FLEXIBLE SIGMOIDOSCOPY N/A 9/9/2020    Procedure: SIGMOIDOSCOPY, FLEXIBLE;  Surgeon: America Goode MD;  Location: [address]);  Service: Endoscopy;  Laterality: N/A;    HIP SURGERY      TRACHEOSTOMY N/A 10/27/2020    Procedure: CREATION, TRACHEOSTOMY;  Surgeon: Kar Palma MD;  Location: [facility];  Service: ENT;  Laterality: N/A;       Review of patient's allergies indicates:   Allergen Reactions    Morphine sulfate Hives       No current facility-administered medications on file prior to encounter.      Current Outpatient Medications on File Prior to Encounter   Medication Sig    acetaminophen (TYLENOL) 325 MG tablet Take 325 mg by mouth every 4 (four) hours as needed for Pain.    arginine-glutamine-calcium HMB (CHEO) 7-7-1.5 gram PwPk Take by mouth 2 (two) times a day.    ascorbic acid, vitamin C, (VITAMIN C) 500 MG tablet 1 tablet (500 mg total) by Per G Tube route once daily.    baclofen (LIORESAL) 10 MG tablet 1 tablet (10 mg total) by Per G Tube route 3 (three) times daily.    bisacodyL (DULCOLAX) 10 mg Supp Place 1 suppository (10 mg total) rectally daily as needed (Until bowel movement if patient has no bowel movement for 2 days).    diphenhydrAMINE (BENADRYL) 12.5 mg/5 mL elixir 10 mLs (25 mg total) by Per G Tube route 4 (four) times daily as needed for Allergies.    gabapentin (NEURONTIN) 250 mg/5 mL solution 5 mLs (250 mg total) by Per G Tube route nightly. At bedtime    lactobacillus acidophilus & bulgar (LACTINEX) 100 million cell packet Take 1 tablet by mouth once daily.    melatonin (MELATIN) 3 mg tablet 2 tablets (6 mg total) by Per G Tube route nightly as needed for Insomnia.    miconazole nitrate 2% (MICOTIN) 2 % Oint Apply topically 2 (two) times daily. for 14 days    multivitamin liquid no.118 Liqd 10 mLs by Per G Tube route once  daily.    PHENobarbitaL 20 mg/5 mL (4 mg/mL) Elix elixir 25 mLs (100 mg total) by Per G Tube route every evening.    potassium chloride (KLOR-CON) 20 mEq Pack DISSOLVE 2 TABLETS AS DIRECTED AND DRINK DAILY    propranoloL (INDERAL) 20 MG tablet 1 tablet (20 mg total) by Per G Tube route 3 (three) times daily. (Patient taking differently: 20 mg by Per G Tube route 4 (four) times daily. )     Family History     Problem Relation (Age of Onset)    Cancer Maternal Grandfather        Tobacco Use    Smoking status: Never Smoker    Smokeless tobacco: Never Used   Substance and Sexual Activity    Alcohol use: Never     Frequency: Never    Drug use: Not Currently    Sexual activity: Never     Review of Systems   Unable to perform ROS: Mental status change     Objective:     Vital Signs (Most Recent):  Temp: 100.2 °F (37.9 °C) (12/06/20 1210)  Pulse: 98 (12/06/20 1403)  Resp: (!) 22 (12/06/20 1403)  BP: 119/85 (12/06/20 1403)  SpO2: 100 % (12/06/20 1403) Vital Signs (24h Range):  Temp:  [100.2 °F (37.9 °C)] 100.2 °F (37.9 °C)  Pulse:  [] 98  Resp:  [18-26] 22  SpO2:  [99 %-100 %] 100 %  BP: (105-137)/(59-93) 119/85     Weight: 32.2 kg (70 lb 15.8 oz)  Body mass index is 15.36 kg/m².    Physical Exam  Constitutional:       General: He is not in acute distress.     Appearance: He is well-developed. He is not diaphoretic.      Comments: Thin   HENT:      Head: Normocephalic and atraumatic.   Eyes:      Pupils: Pupils are equal, round, and reactive to light.   Cardiovascular:      Rate and Rhythm: Normal rate and regular rhythm.      Heart sounds: Normal heart sounds. No murmur. No friction rub. No gallop.    Pulmonary:      Effort: Pulmonary effort is normal. No respiratory distress.      Breath sounds: Normal breath sounds. No stridor. No wheezing or rales.   Abdominal:      General: Bowel sounds are normal. There is distension.      Palpations: Abdomen is soft. There is no mass.      Tenderness: There is no  abdominal tenderness. There is no guarding.      Comments: Colostomy noted, rizvi cath in colostomy opening     Musculoskeletal:      Comments: Contracted extremities   Skin:     General: Skin is warm.      Findings: No erythema.   Neurological:      Mental Status: Mental status is at baseline.           CRANIAL NERVES     CN III, IV, VI   Pupils are equal, round, and reactive to light.       Significant Labs:   Results for orders placed or performed during the hospital encounter of 12/06/20   CBC Auto Differential   Result Value Ref Range    WBC 15.59 (H) 3.90 - 12.70 K/uL    RBC 2.48 (L) 4.60 - 6.20 M/uL    Hemoglobin 6.9 (L) 14.0 - 18.0 g/dL    Hematocrit 23.6 (L) 40.0 - 54.0 %    MCV 95 82 - 98 fL    MCH 27.8 27.0 - 31.0 pg    MCHC 29.2 (L) 32.0 - 36.0 g/dL    RDW 21.2 (H) 11.5 - 14.5 %    Platelets 661 (H) 150 - 350 K/uL    MPV 9.1 (L) 9.2 - 12.9 fL    Immature Granulocytes 0.6 (H) 0.0 - 0.5 %    Gran # (ANC) 11.9 (H) 1.8 - 7.7 K/uL    Immature Grans (Abs) 0.09 (H) 0.00 - 0.04 K/uL    Lymph # 1.4 1.0 - 4.8 K/uL    Mono # 1.8 (H) 0.3 - 1.0 K/uL    Eos # 0.4 0.0 - 0.5 K/uL    Baso # 0.02 0.00 - 0.20 K/uL    nRBC 0 0 /100 WBC    Gran % 76.3 (H) 38.0 - 73.0 %    Lymph % 8.9 (L) 18.0 - 48.0 %    Mono % 11.5 4.0 - 15.0 %    Eosinophil % 2.6 0.0 - 8.0 %    Basophil % 0.1 0.0 - 1.9 %    Differential Method Automated    Comprehensive Metabolic Panel   Result Value Ref Range    Sodium 137 136 - 145 mmol/L    Potassium 4.9 3.5 - 5.1 mmol/L    Chloride 118 (H) 95 - 110 mmol/L    CO2 13 (L) 23 - 29 mmol/L    Glucose 97 70 - 110 mg/dL    BUN 42 (H) 6 - 20 mg/dL    Creatinine 0.6 0.5 - 1.4 mg/dL    Calcium 8.1 (L) 8.7 - 10.5 mg/dL    Total Protein 6.2 6.0 - 8.4 g/dL    Albumin 1.5 (L) 3.5 - 5.2 g/dL    Total Bilirubin 0.1 0.1 - 1.0 mg/dL    Alkaline Phosphatase 98 55 - 135 U/L    AST 15 10 - 40 U/L    ALT 6 (L) 10 - 44 U/L    Anion Gap 6 (L) 8 - 16 mmol/L    eGFR if African American >60 >60 mL/min/1.73 m^2    eGFR if non  African American >60 >60 mL/min/1.73 m^2   Urinalysis, Reflex to Urine Culture Urine, Clean Catch    Specimen: Urine   Result Value Ref Range    Specimen UA Urine, Catheterized     Color, UA Yellow Yellow, Straw, Vane    Appearance, UA Clear Clear    pH, UA 6.0 5.0 - 8.0    Specific Gravity, UA 1.020 1.005 - 1.030    Protein, UA 1+ (A) Negative    Glucose, UA Negative Negative    Ketones, UA Negative Negative    Bilirubin (UA) Negative Negative    Occult Blood UA 3+ (A) Negative    Nitrite, UA Negative Negative    Urobilinogen, UA Negative <2.0 EU/dL    Leukocytes, UA 2+ (A) Negative   COVID-19 Rapid Screening   Result Value Ref Range    SARS-CoV-2 RNA, Amplification, Qual Negative Negative   Lactic acid, plasma #1   Result Value Ref Range    Lactate (Lactic Acid) 1.1 0.5 - 2.2 mmol/L   Urinalysis Microscopic   Result Value Ref Range    RBC, UA >100 (H) 0 - 4 /hpf    WBC, UA 65 (H) 0 - 5 /hpf    Bacteria Few (A) None-Occ /hpf    Hyaline Casts, UA 0 0-1/lpf /lpf    Microscopic Comment SEE COMMENT    Type & Screen   Result Value Ref Range    Group & Rh A NEG     Indirect Tao NEG    Prepare RBC 1 Unit   Result Value Ref Range    UNIT NUMBER [id number]     Product Code [id number]     DISPENSE STATUS CROSSMATCHED     CODING SYSTEM [id number]     Unit Blood Type Code 0600     Unit Blood Type A NEG     Unit Expiration [id number]         Significant Imaging: I have reviewed all pertinent imaging results/findings within the past 24 hours.    Assessment/Plan:     * Status post colostomy  12/6:  Colostomy malfunction  Iglesias cath in place for decompression   Will keep NPO   Surgery consulted on case       UTI (urinary tract infection)  12/6:  U/a and urine culture   Cont cefepime       Sepsis  12/6:  Sepsis likely secondary to UTI  However will cover for intra-abdominal source  Empirically as well  Zyvox on board due to VRE bacteremia from previous culture  Will plan to de-escalate based on blood cultures   Continue zyvox,  cefepime and flagyl  Blood cultures urine cultures pending  Lactic acid WNL  VSS       Decubitus ulcer of ischial area, left, stage IV  12/6:  Will consult wound care       Anemia, unspecified  12/6:  hgb 6.9 on arrival  No signs of bleeding noted  Will check iron studies, folate, b12, retic  Transfuse 1 unit prbc  Cont to monitor h/h  No lovenox/heparin due to anemia        Seizure disorder  12/6:  On phenobarbital 100mg qhs   through peg tube  Will discuss with pharmacy on IV alternatives  Ativan PRN  Seizure precautions      Cerebral palsy  12/6:  Patient nonverbal at baseline  Cont to monitor         VTE Risk Mitigation (From admission, onward)    None             Drew Carroll MD  Department of Hospital Medicine   [facility]

## 2020-12-06 NOTE — CONSULTS
[facility]   General Surgery  Consult Note    Patient Name: Glen Moscoso  MRN: [id number]  Code Status: Prior  Admission Date: 12/6/2020  Hospital Length of Stay: 0 days  Attending Physician: Christiano Tineo MD  Primary Care Provider: Yadi Lawson MD    Patient information was obtained from patient, EMS personnel, nursing home and ER records.     Consults  Subjective:     Principal Problem: Status post colostomy    History of Present Illness: Glen is 25-year-old white male patient who has extensive cerebral palsy, and had been seen by Hospital Medicine as well as surgery team for the evaluation management of extensive osteomyelitis associated with decubitus ulcer.  He was also having difficult time with diversion colon which was constantly contaminate the wound.  He was seen by a colorectal surgeon, and had diverting loop sigmoid colostomy.  He also had a trach and PEG completed at the time.  He was later discharged to a rehab center for further care and antibiotic treatment.  He has return to the emergency room previously with prolapsed loop colostomy which was managed conservatively and discharged.  He has returned today again with prolapsed loop colostomy and obstructive signs.  Abdomen was severely distended.  He is not communicable.    No current facility-administered medications on file prior to encounter.      Current Outpatient Medications on File Prior to Encounter   Medication Sig    acetaminophen (TYLENOL) 325 MG tablet Take 325 mg by mouth every 4 (four) hours as needed for Pain.    arginine-glutamine-calcium HMB (CHEO) 7-7-1.5 gram PwPk Take by mouth 2 (two) times a day.    ascorbic acid, vitamin C, (VITAMIN C) 500 MG tablet 1 tablet (500 mg total) by Per G Tube route once daily.    baclofen (LIORESAL) 10 MG tablet 1 tablet (10 mg total) by Per G Tube route 3 (three) times daily.    bisacodyL (DULCOLAX) 10 mg Supp Place 1 suppository (10 mg total) rectally daily as needed  (Until bowel movement if patient has no bowel movement for 2 days).    diphenhydrAMINE (BENADRYL) 12.5 mg/5 mL elixir 10 mLs (25 mg total) by Per G Tube route 4 (four) times daily as needed for Allergies.    gabapentin (NEURONTIN) 250 mg/5 mL solution 5 mLs (250 mg total) by Per G Tube route nightly. At bedtime    lactobacillus acidophilus & bulgar (LACTINEX) 100 million cell packet Take 1 tablet by mouth once daily.    melatonin (MELATIN) 3 mg tablet 2 tablets (6 mg total) by Per G Tube route nightly as needed for Insomnia.    miconazole nitrate 2% (MICOTIN) 2 % Oint Apply topically 2 (two) times daily. for 14 days    multivitamin liquid no.118 Liqd 10 mLs by Per G Tube route once daily.    PHENobarbitaL 20 mg/5 mL (4 mg/mL) Elix elixir 25 mLs (100 mg total) by Per G Tube route every evening.    potassium chloride (KLOR-CON) 20 mEq Pack DISSOLVE 2 TABLETS AS DIRECTED AND DRINK DAILY    propranoloL (INDERAL) 20 MG tablet 1 tablet (20 mg total) by Per G Tube route 3 (three) times daily. (Patient taking differently: 20 mg by Per G Tube route 4 (four) times daily. )       Review of patient's allergies indicates:   Allergen Reactions    Morphine sulfate Hives       Past Medical History:   Diagnosis Date    Acid reflux     Anemia of chronic disease 8/30/2020    Cerebral palsy     Colitis     Decubitus ulcer of ischial area, left, stage IV 9/10/2020    History of hip surgery     Osteomyelitis of pelvis 9/24/2020    Pneumoperitoneum 9/21/2020    Pressure injury of right ischium, stage 4     S/P percutaneous endoscopic gastrostomy (PEG) tube placement     Seizure disorder 8/26/2020    Seizures     Severe protein-calorie malnutrition     Sinus tachycardia 8/4/2020     Past Surgical History:   Procedure Laterality Date    CHOLECYSTECTOMY      FLEXIBLE SIGMOIDOSCOPY N/A 9/9/2020    Procedure: SIGMOIDOSCOPY, FLEXIBLE;  Surgeon: America Goode MD;  Location: [facility] ([address] [place] [address]);  Service: Endoscopy;   Laterality: N/A;    HIP SURGERY      TRACHEOSTOMY N/A 10/27/2020    Procedure: CREATION, TRACHEOSTOMY;  Surgeon: Kar Palma MD;  Location: [facility];  Service: ENT;  Laterality: N/A;     Family History     Problem Relation (Age of Onset)    Cancer Maternal Grandfather        Tobacco Use    Smoking status: Never Smoker    Smokeless tobacco: Never Used   Substance and Sexual Activity    Alcohol use: Never     Frequency: Never    Drug use: Not Currently    Sexual activity: Never     Review of Systems   Unable to perform ROS: Dementia     Objective:     Vital Signs (Most Recent):  Temp: 100.2 °F (37.9 °C) (12/06/20 1210)  Pulse: 106 (12/06/20 1136)  Resp: (!) 24 (12/06/20 1124)  BP: (!) 105/59 (12/06/20 1124)  SpO2: 99 % (12/06/20 1136) Vital Signs (24h Range):  Temp:  [100.2 °F (37.9 °C)] 100.2 °F (37.9 °C)  Pulse:  [106-111] 106  Resp:  [24-26] 24  SpO2:  [99 %-100 %] 99 %  BP: (105)/(59) 105/59     Weight: 36 kg (79 lb 5.9 oz)  Body mass index is 17.17 kg/m².    Physical Exam  Constitutional:       General: He is not in acute distress.     Appearance: He is well-developed. He is not ill-appearing, toxic-appearing or diaphoretic.   HENT:      Head: Normocephalic.      Right Ear: External ear normal.      Left Ear: External ear normal.      Nose: Nose normal.   Eyes:      General: No scleral icterus.     Pupils: Pupils are equal, round, and reactive to light.   Neck:      Musculoskeletal: Normal range of motion and neck supple.      Thyroid: No thyromegaly.   Cardiovascular:      Rate and Rhythm: Normal rate and regular rhythm.      Heart sounds: Normal heart sounds. No murmur.   Pulmonary:      Effort: Pulmonary effort is normal.      Breath sounds: Normal breath sounds.   Abdominal:      General: Bowel sounds are normal. There is distension.      Palpations: Abdomen is soft.      Tenderness: There is no abdominal tenderness. There is no guarding.      Hernia: A hernia is present.      Comments: Prolapsed loop  sigmoid colostomy.  Visible PEG tube site:  Clean.   Musculoskeletal: Normal range of motion.      Comments: Extensive decubitus ulcer.   Lymphadenopathy:      Cervical: No cervical adenopathy.   Skin:     General: Skin is warm and dry.   Neurological:      Comments: Extensive cerebral palsy.  Non communicable.         Significant Labs:  CBC:   Recent Labs   Lab 12/06/20  1135   WBC 15.59*   RBC 2.48*   HGB 6.9*   HCT 23.6*   *   MCV 95   MCH 27.8   MCHC 29.2*     CMP:   Recent Labs   Lab 12/06/20  1135   GLU 97   CALCIUM 8.1*   ALBUMIN 1.5*   PROT 6.2      K 4.9   CO2 13*   *   BUN 42*   CREATININE 0.6   ALKPHOS 98   ALT 6*   AST 15   BILITOT 0.1       Significant Diagnostics:  I have reviewed and interpreted all pertinent imaging results/findings within the past 24 hours.    Assessment/Plan:     * Status post colostomy  Status post loop sigmoid colostomy on the left lower abdomen; prolapsed colostomy causing colonic obstruction with distention of abdomen.  A Iglesias catheter was inserted to the proximal opening of the sigmoid colostomy to decompress colon and small intestine.    Will defer to the colorectal surgeon who performed the initial surgery to address the prolapsed colostomy during this hospitalization.      VTE Risk Mitigation (From admission, onward)    None          Thank you for your consult. I will follow-up with patient. Please contact us if you have any additional questions.    Alireza Barry MD  General Surgery  [facility]

## 2020-12-06 NOTE — ASSESSMENT & PLAN NOTE
12/6:  Colostomy malfunction  Iglesias cath in place for decompression   Will keep NPO   Surgery consulted on case

## 2020-12-06 NOTE — ASSESSMENT & PLAN NOTE
Status post loop sigmoid colostomy on the left lower abdomen; prolapsed colostomy causing colonic obstruction with distention of abdomen.  A Iglesias catheter was inserted to the proximal opening of the sigmoid colostomy to decompress colon and small intestine.    Will defer to the colorectal surgeon who performed the initial surgery to address the prolapsed colostomy during this hospitalization.

## 2020-12-06 NOTE — ED NOTES
Central line no longer flushing or giving blood return. Providers notified, Peripheral IV to be inserted for medication administration until central line is functional again.

## 2020-12-06 NOTE — ED PROVIDER NOTES
SCRIBE #1 NOTE: IIngrid am scribing for, and in the presence of, Christiano Tineo MD. I have scribed the entire note.       History     Chief Complaint   Patient presents with    abdominal issues     +colostomy draining a lot of liquid stool +lg intestine pushing thru into ostomy bag +abdominal distention +drainage around PEG +pt has bilat decub ulcers +trach on 6L +recent sepsis & resp failure     Review of patient's allergies indicates:   Allergen Reactions    Morphine sulfate Hives         History of Present Illness     HPI    12/6/2020, 11:24 AM  History obtained from [facility].  History limited-patient nonverbal.      History of Present Illness: Glen Moscoso is a 25 y.o. male patient with a PMHx of cerebral palsy who presents to the Emergency Department for evaluation of worsening abdominal distention which onset gradually. Patient is coming from [facility]. Associated sxs include colostomy problem. AASI denies any fever, chills, diaphoresis, SOB, n/v/d, and all other sxs at this time. History limited-patient nonverbal.     Arrival mode: AAS    PCP: Yadi Lawson MD        Past Medical History:  Past Medical History:   Diagnosis Date    Acid reflux     Anemia of chronic disease 8/30/2020    Cerebral palsy     Colitis     Decubitus ulcer of ischial area, left, stage IV 9/10/2020    History of hip surgery     Osteomyelitis of pelvis 9/24/2020    Pneumoperitoneum 9/21/2020    Pressure injury of right ischium, stage 4     S/P percutaneous endoscopic gastrostomy (PEG) tube placement     Seizure disorder 8/26/2020    Seizures     Severe protein-calorie malnutrition     Sinus tachycardia 8/4/2020       Past Surgical History:  Past Surgical History:   Procedure Laterality Date    CHOLECYSTECTOMY      FLEXIBLE SIGMOIDOSCOPY N/A 9/9/2020    Procedure: SIGMOIDOSCOPY, FLEXIBLE;  Surgeon: America Goode MD;  Location: [address]);  Service: Endoscopy;  Laterality: N/A;    HIP SURGERY       TRACHEOSTOMY N/A 10/27/2020    Procedure: CREATION, TRACHEOSTOMY;  Surgeon: Kar Palma MD;  Location: [facility];  Service: ENT;  Laterality: N/A;         Family History:  Family History   Problem Relation Age of Onset    Cancer Maternal Grandfather        Social History:  Social History     Tobacco Use    Smoking status: Never Smoker    Smokeless tobacco: Never Used   Substance and Sexual Activity    Alcohol use: Never     Frequency: Never    Drug use: Not Currently    Sexual activity: Never        Review of Systems     Review of Systems   Unable to perform ROS: Patient nonverbal      Physical Exam     Initial Vitals   BP Pulse Resp Temp SpO2   12/06/20 1124 12/06/20 1117 12/06/20 1117 12/06/20 1210 12/06/20 1117   (!) 105/59 107 (!) 26 100.2 °F (37.9 °C) 100 %      MAP       --                 Physical Exam  Nursing Notes and Vital Signs Reviewed.  Constitutional: Patient is in no acute distress. Well-developed and well-nourished.  Head: Atraumatic. Normocephalic.  Eyes: PERRL. EOM intact. Conjunctivae are not pale. No scleral icterus.  ENT: Mucous membranes are moist. Oropharynx is clear and symmetric.    Neck: Supple. Full ROM. No lymphadenopathy.  Cardiovascular: Regular rate. Regular rhythm. No murmurs, rubs, or gallops. Distal pulses are 2+ and symmetric.  Pulmonary/Chest: No respiratory distress. Clear to auscultation bilaterally. No wheezing or rales.  Abdominal: Prolapse to ostomy (see photo). PEG tube in place.   Genitourinary: No CVA tenderness  Musculoskeletal: Contractures of all four extremities.   Skin: Warm and dry.  Neurological:  Alert, awake, and appropriate.   Psychiatric: Normal affect. Good eye contact.                 ED Course   Critical Care    Date/Time: 12/6/2020 1:15 PM  Performed by: Christiano Tineo MD  Authorized by: Christiano Tineo MD   Direct patient critical care time: 15 minutes  Additional history critical care time: 8 minutes  Ordering / reviewing critical care  time: 12 minutes  Documentation critical care time: 5 minutes  Consulting other physicians critical care time: 5 minutes  Consult with family critical care time: 0 minutes  Other critical care time: 0 minutes  Total critical care time (exclusive of procedural time) : 45 minutes  Critical care time was exclusive of separately billable procedures and treating other patients and teaching time.  Critical care was necessary to treat or prevent imminent or life-threatening deterioration of the following conditions: prolapsed ostomy, bilateral decubitus ulcers to buttocks, leukocytosis.  Critical care was time spent personally by me on the following activities: blood draw for specimens, development of treatment plan with patient or surrogate, discussions with consultants, interpretation of cardiac output measurements, evaluation of patient's response to treatment, examination of patient, obtaining history from patient or surrogate, ordering and performing treatments and interventions, ordering and review of radiographic studies, ordering and review of laboratory studies, pulse oximetry, re-evaluation of patient's condition and review of old charts.        ED Vital Signs:  Vitals:    12/06/20 1117 12/06/20 1124 12/06/20 1136 12/06/20 1210   BP:  (!) 105/59     Pulse: 107 (!) 111 106    Resp: (!) 26 (!) 24     Temp:    100.2 °F (37.9 °C)   TempSrc:    Axillary   SpO2: 100% 100% 99%    Weight:        12/06/20 1240 12/06/20 1325   BP:     Pulse:     Resp:     Temp:     TempSrc:     SpO2:     Weight: 36 kg (79 lb 5.9 oz) 32.2 kg (70 lb 15.8 oz)       Abnormal Lab Results:  Labs Reviewed   CBC W/ AUTO DIFFERENTIAL - Abnormal; Notable for the following components:       Result Value    WBC 15.59 (*)     RBC 2.48 (*)     Hemoglobin 6.9 (*)     Hematocrit 23.6 (*)     MCHC 29.2 (*)     RDW 21.2 (*)     Platelets 661 (*)     MPV 9.1 (*)     Immature Granulocytes 0.6 (*)     Gran # (ANC) 11.9 (*)     Immature Grans (Abs) 0.09 (*)      Mono # 1.8 (*)     Gran % 76.3 (*)     Lymph % 8.9 (*)     All other components within normal limits   COMPREHENSIVE METABOLIC PANEL - Abnormal; Notable for the following components:    Chloride 118 (*)     CO2 13 (*)     BUN 42 (*)     Calcium 8.1 (*)     Albumin 1.5 (*)     ALT 6 (*)     Anion Gap 6 (*)     All other components within normal limits   URINALYSIS, REFLEX TO URINE CULTURE - Abnormal; Notable for the following components:    Protein, UA 1+ (*)     Occult Blood UA 3+ (*)     Leukocytes, UA 2+ (*)     All other components within normal limits    Narrative:     Specimen Source->Urine   URINALYSIS MICROSCOPIC - Abnormal; Notable for the following components:    RBC, UA >100 (*)     WBC, UA 65 (*)     Bacteria Few (*)     All other components within normal limits    Narrative:     Specimen Source->Urine   CULTURE, BLOOD   CULTURE, BLOOD   CULTURE, URINE   SARS-COV-2 RNA AMPLIFICATION, QUAL   LACTIC ACID, PLASMA   TYPE & SCREEN        All Lab Results:  Results for orders placed or performed during the hospital encounter of 12/06/20   CBC Auto Differential   Result Value Ref Range    WBC 15.59 (H) 3.90 - 12.70 K/uL    RBC 2.48 (L) 4.60 - 6.20 M/uL    Hemoglobin 6.9 (L) 14.0 - 18.0 g/dL    Hematocrit 23.6 (L) 40.0 - 54.0 %    MCV 95 82 - 98 fL    MCH 27.8 27.0 - 31.0 pg    MCHC 29.2 (L) 32.0 - 36.0 g/dL    RDW 21.2 (H) 11.5 - 14.5 %    Platelets 661 (H) 150 - 350 K/uL    MPV 9.1 (L) 9.2 - 12.9 fL    Immature Granulocytes 0.6 (H) 0.0 - 0.5 %    Gran # (ANC) 11.9 (H) 1.8 - 7.7 K/uL    Immature Grans (Abs) 0.09 (H) 0.00 - 0.04 K/uL    Lymph # 1.4 1.0 - 4.8 K/uL    Mono # 1.8 (H) 0.3 - 1.0 K/uL    Eos # 0.4 0.0 - 0.5 K/uL    Baso # 0.02 0.00 - 0.20 K/uL    nRBC 0 0 /100 WBC    Gran % 76.3 (H) 38.0 - 73.0 %    Lymph % 8.9 (L) 18.0 - 48.0 %    Mono % 11.5 4.0 - 15.0 %    Eosinophil % 2.6 0.0 - 8.0 %    Basophil % 0.1 0.0 - 1.9 %    Differential Method Automated    Comprehensive Metabolic Panel   Result Value Ref  Range    Sodium 137 136 - 145 mmol/L    Potassium 4.9 3.5 - 5.1 mmol/L    Chloride 118 (H) 95 - 110 mmol/L    CO2 13 (L) 23 - 29 mmol/L    Glucose 97 70 - 110 mg/dL    BUN 42 (H) 6 - 20 mg/dL    Creatinine 0.6 0.5 - 1.4 mg/dL    Calcium 8.1 (L) 8.7 - 10.5 mg/dL    Total Protein 6.2 6.0 - 8.4 g/dL    Albumin 1.5 (L) 3.5 - 5.2 g/dL    Total Bilirubin 0.1 0.1 - 1.0 mg/dL    Alkaline Phosphatase 98 55 - 135 U/L    AST 15 10 - 40 U/L    ALT 6 (L) 10 - 44 U/L    Anion Gap 6 (L) 8 - 16 mmol/L    eGFR if African American >60 >60 mL/min/1.73 m^2    eGFR if non African American >60 >60 mL/min/1.73 m^2   Urinalysis, Reflex to Urine Culture Urine, Clean Catch    Specimen: Urine   Result Value Ref Range    Specimen UA Urine, Catheterized     Color, UA Yellow Yellow, Straw, Vane    Appearance, UA Clear Clear    pH, UA 6.0 5.0 - 8.0    Specific Gravity, UA 1.020 1.005 - 1.030    Protein, UA 1+ (A) Negative    Glucose, UA Negative Negative    Ketones, UA Negative Negative    Bilirubin (UA) Negative Negative    Occult Blood UA 3+ (A) Negative    Nitrite, UA Negative Negative    Urobilinogen, UA Negative <2.0 EU/dL    Leukocytes, UA 2+ (A) Negative   COVID-19 Rapid Screening   Result Value Ref Range    SARS-CoV-2 RNA, Amplification, Qual Negative Negative   Urinalysis Microscopic   Result Value Ref Range    RBC, UA >100 (H) 0 - 4 /hpf    WBC, UA 65 (H) 0 - 5 /hpf    Bacteria Few (A) None-Occ /hpf    Hyaline Casts, UA 0 0-1/lpf /lpf    Microscopic Comment SEE COMMENT    Type & Screen   Result Value Ref Range    Group & Rh A NEG     Indirect Tao NEG          Imaging Results:  Imaging Results          X-Ray Chest AP Portable (Final result)  Result time 12/06/20 13:13:24    Final result by Martin Ha MD (12/06/20 13:13:24)                 Impression:      No acute infiltrate or consolidation.  Gaseous distention of bowel loops.      Electronically signed by: Martin Ha MD  Date:    12/06/2020  Time:    13:13              Narrative:    EXAMINATION:  XR CHEST AP PORTABLE    CLINICAL HISTORY:  cough;    TECHNIQUE:  Single frontal view of the chest was performed.    COMPARISON:  10/31/2020    FINDINGS:  Tracheostomy tube.  Central line present with the tip overlying the superior vena cava.  Lung fields are clear.    The cardiac silhouette is normal in size. Gaseous distention of bowel loops similar to the prior study.                                 The EKG was ordered, reviewed, and independently interpreted by the ED provider.  Interpretation time: 12:24  Rate: 103 BPM  Rhythm: sinus tachycardia  Interpretation: No acute ST changes. No STEMI.         The Emergency Provider reviewed the vital signs and test results, which are outlined above.     ED Discussion     12:12 PM: Discussed pt's case with Dr. Barry (general surgery) who recommends admission to hospital medicine, decompression, and fluids. He will evaluate patient.    12:29 PM: Dr. Barry (general surgery) at bedside. He will consult Dr. Ortez (general surgery) for definitive care.     1:18 PM: Discussed case with Tomasa Alvarado NP (Hospital Medicine). Dr. Bobo agrees with current care and management of pt and accepts admission.   Admitting Service: hospital medicine  Admitting Physician: Dr. Bobo  Admit to: obs tele    1:19 PM: Re-evaluated pt. I have discussed test results, shared treatment plan, and the need for admission with family at bedside. Family expresses understanding at this time and agrees with all information. All questions answered. Pt has no further questions or concerns at this time. Pt is ready for admit.       Medical Decision Making:   Clinical Tests:   Lab Tests: Ordered and Reviewed  Radiological Study: Ordered and Reviewed  Medical Tests: Ordered and Reviewed           ED Medication(s):  Medications   lactated ringers bolus 1,080 mL (has no administration in time range)   acetaminophen oral solution 499.5074 mg (has no administration in time range)    cefepime in dextrose 5 % IVPB 2 g (has no administration in time range)   metronidazole IVPB 500 mg (has no administration in time range)   linezolid 600 mg/300 mL IVPB 600 mg (has no administration in time range)     Scribe Attestation:   Scribe #1: I performed the above scribed service and the documentation accurately describes the services I performed. I attest to the accuracy of the note.     Attending:   Physician Attestation Statement for Scribe #1: I, Christiano Tineo MD, personally performed the services described in this documentation, as scribed by Ingrid Gardner, in my presence, and it is both accurate and complete.           Clinical Impression       ICD-10-CM ICD-9-CM   1. Colostomy prolapse  K94.09 569.69   2. Tachycardia  R00.0 785.0   3. Status post colostomy  Z93.3 V44.3   4. Pressure injury of sacral region, stage 4  L89.154 707.03     707.24   5. Leukocytosis, unspecified type  D72.829 288.60   6. Anemia, unspecified type  D64.9 285.9       Disposition:   Disposition: Placed in Observation  Condition: Fair         Christiano Tineo MD  12/06/20 6968

## 2020-12-06 NOTE — ED NOTES
Pt gown removed, soiled linens from John were removed. Pt was bathed with bathing wipes by myself and DELROY Meeks, removing all fecal matter from the pt. Pt rizvi catheter replaced by DELROY Meeks. Pt decubitus ulcer dressings on the L and R buttocks were removed, examined by MD and RN, and redressed with clean dressings. Pt axillary temp is 100.2. Pt placed in clean hospital gown, covered with sheet, and head placed on pillow.

## 2020-12-06 NOTE — ASSESSMENT & PLAN NOTE
12/6:  Sepsis likely secondary to UTI  However will cover for intra-abdominal source  Empirically as well  Zyvox on board due to VRE bacteremia from previous culture  Will plan to de-escalate based on blood cultures   Continue zyvox, cefepime and flagyl  Blood cultures urine cultures pending  Lactic acid WNL  VSS

## 2020-12-06 NOTE — ED NOTES
Report called to [employer] on [employer]. Blood to be administered upon reaching inpatient room.

## 2020-12-06 NOTE — HPI
Patient is a 26 y/o  male with a PMH of seizures, colitis, acid reflux, severe PCM, Peg tube, pelvic osteo, cerebral palsy, anemia, proctocolitis to ED from [facility] due to malfunctioning colostomy. Patient was at [facility] completing abx course. He was discharged on . Per chart review patient grew VRE on last blood cultures. Unable to obtain history as patient is non-verbal without any family members at bedside. Patient presented for worsening abdominal distention and concern for colostomy malfunction. Surgery was consulted in ED and rizvi catheter was placed in sigmoid colostomy for decompression of the bowel. Lab work on arrival concerning for leukocytosis, hb.9, along with metabolic acidosis. Patient was bolused 30 cc/kg of LR and started on zyvox, cefepime, and flagyl.

## 2020-12-06 NOTE — ASSESSMENT & PLAN NOTE
12/6:  hgb 6.9 on arrival  No signs of bleeding noted  Will check iron studies, folate, b12, retic  Transfuse 1 unit prbc  Cont to monitor h/h  No lovenox/heparin due to anemia

## 2020-12-06 NOTE — ASSESSMENT & PLAN NOTE
12/6:  On phenobarbital 100mg qhs   through peg tube  Will discuss with pharmacy on IV alternatives  Ativan PRN  Seizure precautions

## 2020-12-06 NOTE — SUBJECTIVE & OBJECTIVE
No current facility-administered medications on file prior to encounter.      Current Outpatient Medications on File Prior to Encounter   Medication Sig    acetaminophen (TYLENOL) 325 MG tablet Take 325 mg by mouth every 4 (four) hours as needed for Pain.    arginine-glutamine-calcium HMB (CHEO) 7-7-1.5 gram PwPk Take by mouth 2 (two) times a day.    ascorbic acid, vitamin C, (VITAMIN C) 500 MG tablet 1 tablet (500 mg total) by Per G Tube route once daily.    baclofen (LIORESAL) 10 MG tablet 1 tablet (10 mg total) by Per G Tube route 3 (three) times daily.    bisacodyL (DULCOLAX) 10 mg Supp Place 1 suppository (10 mg total) rectally daily as needed (Until bowel movement if patient has no bowel movement for 2 days).    diphenhydrAMINE (BENADRYL) 12.5 mg/5 mL elixir 10 mLs (25 mg total) by Per G Tube route 4 (four) times daily as needed for Allergies.    gabapentin (NEURONTIN) 250 mg/5 mL solution 5 mLs (250 mg total) by Per G Tube route nightly. At bedtime    lactobacillus acidophilus & bulgar (LACTINEX) 100 million cell packet Take 1 tablet by mouth once daily.    melatonin (MELATIN) 3 mg tablet 2 tablets (6 mg total) by Per G Tube route nightly as needed for Insomnia.    miconazole nitrate 2% (MICOTIN) 2 % Oint Apply topically 2 (two) times daily. for 14 days    multivitamin liquid no.118 Liqd 10 mLs by Per G Tube route once daily.    PHENobarbitaL 20 mg/5 mL (4 mg/mL) Elix elixir 25 mLs (100 mg total) by Per G Tube route every evening.    potassium chloride (KLOR-CON) 20 mEq Pack DISSOLVE 2 TABLETS AS DIRECTED AND DRINK DAILY    propranoloL (INDERAL) 20 MG tablet 1 tablet (20 mg total) by Per G Tube route 3 (three) times daily. (Patient taking differently: 20 mg by Per G Tube route 4 (four) times daily. )       Review of patient's allergies indicates:   Allergen Reactions    Morphine sulfate Hives       Past Medical History:   Diagnosis Date    Acid reflux     Anemia of chronic disease 8/30/2020     Cerebral palsy     Colitis     Decubitus ulcer of ischial area, left, stage IV 9/10/2020    History of hip surgery     Osteomyelitis of pelvis 9/24/2020    Pneumoperitoneum 9/21/2020    Pressure injury of right ischium, stage 4     S/P percutaneous endoscopic gastrostomy (PEG) tube placement     Seizure disorder 8/26/2020    Seizures     Severe protein-calorie malnutrition     Sinus tachycardia 8/4/2020     Past Surgical History:   Procedure Laterality Date    CHOLECYSTECTOMY      FLEXIBLE SIGMOIDOSCOPY N/A 9/9/2020    Procedure: SIGMOIDOSCOPY, FLEXIBLE;  Surgeon: America Goode MD;  Location: [facility] ([address] [place] [address]);  Service: Endoscopy;  Laterality: N/A;    HIP SURGERY      TRACHEOSTOMY N/A 10/27/2020    Procedure: CREATION, TRACHEOSTOMY;  Surgeon: Kar Palma MD;  Location: [facility];  Service: ENT;  Laterality: N/A;     Family History     Problem Relation (Age of Onset)    Cancer Maternal Grandfather        Tobacco Use    Smoking status: Never Smoker    Smokeless tobacco: Never Used   Substance and Sexual Activity    Alcohol use: Never     Frequency: Never    Drug use: Not Currently    Sexual activity: Never     Review of Systems   Unable to perform ROS: Dementia     Objective:     Vital Signs (Most Recent):  Temp: 100.2 °F (37.9 °C) (12/06/20 1210)  Pulse: 106 (12/06/20 1136)  Resp: (!) 24 (12/06/20 1124)  BP: (!) 105/59 (12/06/20 1124)  SpO2: 99 % (12/06/20 1136) Vital Signs (24h Range):  Temp:  [100.2 °F (37.9 °C)] 100.2 °F (37.9 °C)  Pulse:  [106-111] 106  Resp:  [24-26] 24  SpO2:  [99 %-100 %] 99 %  BP: (105)/(59) 105/59     Weight: 36 kg (79 lb 5.9 oz)  Body mass index is 17.17 kg/m².    Physical Exam  Constitutional:       General: He is not in acute distress.     Appearance: He is well-developed. He is not ill-appearing, toxic-appearing or diaphoretic.   HENT:      Head: Normocephalic.      Right Ear: External ear normal.      Left Ear: External ear normal.      Nose: Nose normal.    Eyes:      General: No scleral icterus.     Pupils: Pupils are equal, round, and reactive to light.   Neck:      Musculoskeletal: Normal range of motion and neck supple.      Thyroid: No thyromegaly.   Cardiovascular:      Rate and Rhythm: Normal rate and regular rhythm.      Heart sounds: Normal heart sounds. No murmur.   Pulmonary:      Effort: Pulmonary effort is normal.      Breath sounds: Normal breath sounds.   Abdominal:      General: Bowel sounds are normal. There is distension.      Palpations: Abdomen is soft.      Tenderness: There is no abdominal tenderness. There is no guarding.      Hernia: A hernia is present.      Comments: Prolapsed loop sigmoid colostomy.  Visible PEG tube site:  Clean.   Musculoskeletal: Normal range of motion.      Comments: Extensive decubitus ulcer.   Lymphadenopathy:      Cervical: No cervical adenopathy.   Skin:     General: Skin is warm and dry.   Neurological:      Comments: Extensive cerebral palsy.  Non communicable.         Significant Labs:  CBC:   Recent Labs   Lab 12/06/20  1135   WBC 15.59*   RBC 2.48*   HGB 6.9*   HCT 23.6*   *   MCV 95   MCH 27.8   MCHC 29.2*     CMP:   Recent Labs   Lab 12/06/20  1135   GLU 97   CALCIUM 8.1*   ALBUMIN 1.5*   PROT 6.2      K 4.9   CO2 13*   *   BUN 42*   CREATININE 0.6   ALKPHOS 98   ALT 6*   AST 15   BILITOT 0.1       Significant Diagnostics:  I have reviewed and interpreted all pertinent imaging results/findings within the past 24 hours.

## 2020-12-07 PROBLEM — E43 SEVERE MALNUTRITION: Status: ACTIVE | Noted: 2020-12-07

## 2020-12-07 LAB
ANION GAP SERPL CALC-SCNC: 11 MMOL/L (ref 8–16)
ANION GAP SERPL CALC-SCNC: 6 MMOL/L (ref 8–16)
ANION GAP SERPL CALC-SCNC: 8 MMOL/L (ref 8–16)
BASOPHILS # BLD AUTO: 0.04 K/UL (ref 0–0.2)
BASOPHILS NFR BLD: 0.3 % (ref 0–1.9)
BUN SERPL-MCNC: 29 MG/DL (ref 6–20)
BUN SERPL-MCNC: 33 MG/DL (ref 6–20)
BUN SERPL-MCNC: 35 MG/DL (ref 6–20)
CALCIUM SERPL-MCNC: 8 MG/DL (ref 8.7–10.5)
CALCIUM SERPL-MCNC: 8.2 MG/DL (ref 8.7–10.5)
CALCIUM SERPL-MCNC: 8.5 MG/DL (ref 8.7–10.5)
CHLORIDE SERPL-SCNC: 110 MMOL/L (ref 95–110)
CHLORIDE SERPL-SCNC: 116 MMOL/L (ref 95–110)
CHLORIDE SERPL-SCNC: 117 MMOL/L (ref 95–110)
CO2 SERPL-SCNC: 11 MMOL/L (ref 23–29)
CO2 SERPL-SCNC: 16 MMOL/L (ref 23–29)
CO2 SERPL-SCNC: 20 MMOL/L (ref 23–29)
CREAT SERPL-MCNC: 0.6 MG/DL (ref 0.5–1.4)
DIFFERENTIAL METHOD: ABNORMAL
EOSINOPHIL # BLD AUTO: 0.1 K/UL (ref 0–0.5)
EOSINOPHIL NFR BLD: 0.4 % (ref 0–8)
ERYTHROCYTE [DISTWIDTH] IN BLOOD BY AUTOMATED COUNT: 19.3 % (ref 11.5–14.5)
EST. GFR  (AFRICAN AMERICAN): >60 ML/MIN/1.73 M^2
EST. GFR  (NON AFRICAN AMERICAN): >60 ML/MIN/1.73 M^2
FERRITIN SERPL-MCNC: 155 NG/ML (ref 20–300)
FOLATE SERPL-MCNC: 10.6 NG/ML (ref 4–24)
GLUCOSE SERPL-MCNC: 107 MG/DL (ref 70–110)
GLUCOSE SERPL-MCNC: 74 MG/DL (ref 70–110)
GLUCOSE SERPL-MCNC: 82 MG/DL (ref 70–110)
HCT VFR BLD AUTO: 36.2 % (ref 40–54)
HGB BLD-MCNC: 10.9 G/DL (ref 14–18)
IMM GRANULOCYTES # BLD AUTO: 0.06 K/UL (ref 0–0.04)
IMM GRANULOCYTES NFR BLD AUTO: 0.5 % (ref 0–0.5)
IRON SERPL-MCNC: 39 UG/DL (ref 45–160)
LYMPHOCYTES # BLD AUTO: 1.7 K/UL (ref 1–4.8)
LYMPHOCYTES NFR BLD: 12.4 % (ref 18–48)
MCH RBC QN AUTO: 28.1 PG (ref 27–31)
MCHC RBC AUTO-ENTMCNC: 30.1 G/DL (ref 32–36)
MCV RBC AUTO: 93 FL (ref 82–98)
MONOCYTES # BLD AUTO: 1.6 K/UL (ref 0.3–1)
MONOCYTES NFR BLD: 12.2 % (ref 4–15)
NEUTROPHILS # BLD AUTO: 9.9 K/UL (ref 1.8–7.7)
NEUTROPHILS NFR BLD: 74.2 % (ref 38–73)
NRBC BLD-RTO: 0 /100 WBC
PLATELET # BLD AUTO: 571 K/UL (ref 150–350)
PMV BLD AUTO: 9.4 FL (ref 9.2–12.9)
POTASSIUM SERPL-SCNC: 2.5 MMOL/L (ref 3.5–5.1)
POTASSIUM SERPL-SCNC: 2.6 MMOL/L (ref 3.5–5.1)
POTASSIUM SERPL-SCNC: 2.8 MMOL/L (ref 3.5–5.1)
RBC # BLD AUTO: 3.88 M/UL (ref 4.6–6.2)
SATURATED IRON: 21 % (ref 20–50)
SODIUM SERPL-SCNC: 138 MMOL/L (ref 136–145)
SODIUM SERPL-SCNC: 138 MMOL/L (ref 136–145)
SODIUM SERPL-SCNC: 139 MMOL/L (ref 136–145)
TOTAL IRON BINDING CAPACITY: 186 UG/DL (ref 250–450)
TRANSFERRIN SERPL-MCNC: 126 MG/DL (ref 200–375)
VIT B12 SERPL-MCNC: 479 PG/ML (ref 210–950)
WBC # BLD AUTO: 13.31 K/UL (ref 3.9–12.7)

## 2020-12-07 PROCEDURE — 96374 THER/PROPH/DIAG INJ IV PUSH: CPT | Mod: 59

## 2020-12-07 PROCEDURE — 99024 PR POST-OP FOLLOW-UP VISIT: ICD-10-PCS | Mod: ,,, | Performed by: COLON & RECTAL SURGERY

## 2020-12-07 PROCEDURE — 63600175 PHARM REV CODE 636 W HCPCS: Performed by: NURSE PRACTITIONER

## 2020-12-07 PROCEDURE — 25000003 PHARM REV CODE 250: Performed by: NURSE PRACTITIONER

## 2020-12-07 PROCEDURE — 96366 THER/PROPH/DIAG IV INF ADDON: CPT

## 2020-12-07 PROCEDURE — A4217 STERILE WATER/SALINE, 500 ML: HCPCS | Performed by: NURSE PRACTITIONER

## 2020-12-07 PROCEDURE — S0030 INJECTION, METRONIDAZOLE: HCPCS | Performed by: COLON & RECTAL SURGERY

## 2020-12-07 PROCEDURE — S0030 INJECTION, METRONIDAZOLE: HCPCS | Performed by: FAMILY MEDICINE

## 2020-12-07 PROCEDURE — 63600175 PHARM REV CODE 636 W HCPCS: Performed by: FAMILY MEDICINE

## 2020-12-07 PROCEDURE — 36415 COLL VENOUS BLD VENIPUNCTURE: CPT

## 2020-12-07 PROCEDURE — 63600175 PHARM REV CODE 636 W HCPCS: Performed by: INTERNAL MEDICINE

## 2020-12-07 PROCEDURE — 63600175 PHARM REV CODE 636 W HCPCS: Performed by: COLON & RECTAL SURGERY

## 2020-12-07 PROCEDURE — 96375 TX/PRO/DX INJ NEW DRUG ADDON: CPT

## 2020-12-07 PROCEDURE — 25000003 PHARM REV CODE 250: Performed by: FAMILY MEDICINE

## 2020-12-07 PROCEDURE — 85025 COMPLETE CBC W/AUTO DIFF WBC: CPT

## 2020-12-07 PROCEDURE — 96376 TX/PRO/DX INJ SAME DRUG ADON: CPT

## 2020-12-07 PROCEDURE — 27000221 HC OXYGEN, UP TO 24 HOURS

## 2020-12-07 PROCEDURE — 80048 BASIC METABOLIC PNL TOTAL CA: CPT

## 2020-12-07 PROCEDURE — 27200966 HC CLOSED SUCTION SYSTEM

## 2020-12-07 PROCEDURE — A4217 STERILE WATER/SALINE, 500 ML: HCPCS | Performed by: FAMILY MEDICINE

## 2020-12-07 PROCEDURE — 27201112

## 2020-12-07 PROCEDURE — 99900026 HC AIRWAY MAINTENANCE (STAT)

## 2020-12-07 PROCEDURE — 99024 POSTOP FOLLOW-UP VISIT: CPT | Mod: ,,, | Performed by: COLON & RECTAL SURGERY

## 2020-12-07 PROCEDURE — 25000003 PHARM REV CODE 250: Performed by: COLON & RECTAL SURGERY

## 2020-12-07 PROCEDURE — 99900035 HC TECH TIME PER 15 MIN (STAT)

## 2020-12-07 PROCEDURE — G0378 HOSPITAL OBSERVATION PER HR: HCPCS

## 2020-12-07 PROCEDURE — 36430 TRANSFUSION BLD/BLD COMPNT: CPT

## 2020-12-07 PROCEDURE — 80048 BASIC METABOLIC PNL TOTAL CA: CPT | Mod: 91

## 2020-12-07 PROCEDURE — 11000001 HC ACUTE MED/SURG PRIVATE ROOM

## 2020-12-07 RX ORDER — POTASSIUM CHLORIDE 7.45 MG/ML
10 INJECTION INTRAVENOUS
Status: DISPENSED | OUTPATIENT
Start: 2020-12-07 | End: 2020-12-07

## 2020-12-07 RX ORDER — POTASSIUM CHLORIDE 7.45 MG/ML
10 INJECTION INTRAVENOUS
Status: ACTIVE | OUTPATIENT
Start: 2020-12-07 | End: 2020-12-07

## 2020-12-07 RX ORDER — MAGNESIUM SULFATE HEPTAHYDRATE 40 MG/ML
2 INJECTION, SOLUTION INTRAVENOUS ONCE
Status: COMPLETED | OUTPATIENT
Start: 2020-12-07 | End: 2020-12-07

## 2020-12-07 RX ORDER — POTASSIUM CHLORIDE 7.45 MG/ML
10 INJECTION INTRAVENOUS
Status: COMPLETED | OUTPATIENT
Start: 2020-12-07 | End: 2020-12-08

## 2020-12-07 RX ADMIN — PROPRANOLOL HYDROCHLORIDE 20 MG: 20 TABLET ORAL at 02:12

## 2020-12-07 RX ADMIN — METRONIDAZOLE 500 MG: 500 INJECTION, SOLUTION INTRAVENOUS at 09:12

## 2020-12-07 RX ADMIN — PHENOBARBITAL 100 MG: 20 ELIXIR ORAL at 09:12

## 2020-12-07 RX ADMIN — BACLOFEN 10 MG: 10 TABLET ORAL at 02:12

## 2020-12-07 RX ADMIN — PROPRANOLOL HYDROCHLORIDE 20 MG: 20 TABLET ORAL at 09:12

## 2020-12-07 RX ADMIN — BACLOFEN 10 MG: 10 TABLET ORAL at 09:12

## 2020-12-07 RX ADMIN — METRONIDAZOLE 500 MG: 500 INJECTION, SOLUTION INTRAVENOUS at 05:12

## 2020-12-07 RX ADMIN — CEFEPIME HYDROCHLORIDE 2 G: 2 INJECTION, SOLUTION INTRAVENOUS at 02:12

## 2020-12-07 RX ADMIN — POTASSIUM CHLORIDE 10 MEQ: 7.46 INJECTION, SOLUTION INTRAVENOUS at 06:12

## 2020-12-07 RX ADMIN — ALTEPLASE 2 MG: 2.2 INJECTION, POWDER, LYOPHILIZED, FOR SOLUTION INTRAVENOUS at 07:12

## 2020-12-07 RX ADMIN — LINEZOLID 600 MG: 600 INJECTION, SOLUTION INTRAVENOUS at 02:12

## 2020-12-07 RX ADMIN — LINEZOLID 600 MG: 600 INJECTION, SOLUTION INTRAVENOUS at 03:12

## 2020-12-07 RX ADMIN — CEFEPIME HYDROCHLORIDE 2 G: 2 INJECTION, SOLUTION INTRAVENOUS at 05:12

## 2020-12-07 RX ADMIN — SODIUM BICARBONATE: 84 INJECTION, SOLUTION INTRAVENOUS at 07:12

## 2020-12-07 RX ADMIN — MAGNESIUM SULFATE 2 G: 2 INJECTION INTRAVENOUS at 10:12

## 2020-12-07 RX ADMIN — CEFEPIME HYDROCHLORIDE 2 G: 2 INJECTION, SOLUTION INTRAVENOUS at 09:12

## 2020-12-07 RX ADMIN — GABAPENTIN 250 MG: 250 SUSPENSION ORAL at 09:12

## 2020-12-07 RX ADMIN — POTASSIUM CHLORIDE 10 MEQ: 7.46 INJECTION, SOLUTION INTRAVENOUS at 10:12

## 2020-12-07 NOTE — PLAN OF CARE
Recommendations     Recommendation:   1. When medically appropriate, inititate enteral nutrition of Isosource 1.5 @ 15mL/hr and advance as tolerated to goal rate of 30mL/hr daily with continuous water flushes @ 20mL/hr (or Per MD)  + 1 packet of bene protein daily.   * This provides 720mL total volume; 1080 calories; 55gm protein; 560mL Free water + 480mL additional water from flushes   2. Weekly weights.   3. RD to follow up.      Goals: EN initiation within the next 48 hours-pending surgery   Nutrition Goal Status: new  Communication of RD Recs: (Plan of Care)    Shakila Hudson RD, LDN

## 2020-12-07 NOTE — ASSESSMENT & PLAN NOTE
Nutrition Problem:  Severe Protein-Calorie Malnutrition  Malnutrition in the context of Chronic Illness/Injury    Related to (etiology):  Decreased ability to consume sufficient energy; alteration in GI structure and function.     Signs and Symptoms (as evidenced by):  Energy Intake: <50% of estimated energy requirement for 3 days (RD predicts prior to admission) with TF on hold  Body Fat Depletion: severe depletion of orbitals and triceps   Muscle Mass Depletion: severe depletion of temples, clavicle region, scapular region, interosseous muscle and lower extremities   Weight Loss: None per EMR; malnutrition criteria met per BMI @ 17.12kg/m^2  Hx of cerebral palsy     Interventions(treatment strategy):  enteral nutrition   Medical food supplement-beneprotein (wounds)  Collaboration with other providers    Nutrition Diagnosis Status:  Continues

## 2020-12-07 NOTE — SUBJECTIVE & OBJECTIVE
Interval History: Good stoma output overnight.     Medications:  Continuous Infusions:   custom IV infusion builder 100 mL/hr at 12/07/20 0745     Scheduled Meds:   baclofen  10 mg Per G Tube TID    ceFEPime (MAXIPIME) IVPB  2 g Intravenous Q8H    gabapentin  250 mg Per G Tube QHS    linezolid  600 mg Intravenous Q12H    metronidazole  500 mg Intravenous Q8H    PHENobarbitaL  100 mg Per G Tube QHS    propranoloL  20 mg Per G Tube TID     PRN Meds:sodium chloride, lorazepam, melatonin     Review of patient's allergies indicates:   Allergen Reactions    Morphine sulfate Hives     Objective:     Vital Signs (Most Recent):  Temp: 97.9 °F (36.6 °C) (12/07/20 0737)  Pulse: 94 (12/07/20 0737)  Resp: 14 (12/07/20 0737)  BP: 122/84 (12/07/20 0737)  SpO2: 100 % (12/07/20 0737) Vital Signs (24h Range):  Temp:  [97.8 °F (36.6 °C)-100.2 °F (37.9 °C)] 97.9 °F (36.6 °C)  Pulse:  [] 94  Resp:  [14-26] 14  SpO2:  [99 %-100 %] 100 %  BP: (105-137)/(59-93) 122/84     Weight: 32.2 kg (70 lb 15.8 oz)  Body mass index is 17.12 kg/m².    Intake/Output - Last 3 Shifts       12/05 0700 - 12/06 0659 12/06 0700 - 12/07 0659 12/07 0700 - 12/08 0659    Urine (mL/kg/hr)  1500     Stool  1100     Total Output  2600     Net  -2600                  Physical Exam  Constitutional:       General: He is not in acute distress.     Appearance: He is well-developed. He is not ill-appearing, toxic-appearing or diaphoretic.   HENT:      Right Ear: External ear normal.      Left Ear: External ear normal.      Nose: Nose normal.   Eyes:      General: No scleral icterus.  Neck:      Musculoskeletal: Normal range of motion and neck supple.      Thyroid: No thyromegaly.   Cardiovascular:      Rate and Rhythm: Normal rate and regular rhythm.      Heart sounds: Normal heart sounds. No murmur.   Pulmonary:      Effort: Pulmonary effort is normal.      Breath sounds: Normal breath sounds.   Abdominal:      Comments: Soft, mild distention; slight  ostomy prolapse and edematous with rizvi in proximal limb with liquid stool in bag   Musculoskeletal:      Comments: Extensive decubitus ulcer   Lymphadenopathy:      Cervical: No cervical adenopathy.   Skin:     General: Skin is warm and dry.   Neurological:      Comments: Nonverbal       Significant Labs:  CBC:   Recent Labs   Lab 12/06/20  1135   WBC 15.59*   RBC 2.48*   HGB 6.9*   HCT 23.6*   *   MCV 95   MCH 27.8   MCHC 29.2*     BMP:   Recent Labs   Lab 12/07/20  0051         K 2.5*   *   CO2 11*   BUN 35*   CREATININE 0.6   CALCIUM 8.5*       Significant Diagnostics:  I have reviewed all pertinent imaging results/findings within the past 24 hours.

## 2020-12-07 NOTE — CONSULTS
12/07/20 1130   Handoff Report   Given To ROBBIN Pierce   Pain/Comfort/Sleep   Preferred Pain Scale rFLACC (Revised Face Legs Arms Cry Consolability Scale)   rFLACC Pain Rating: - Face 1-->occasional grimace or frown, withdrawn, disinterested, appears sad or worried   rFLACC Pain Rating: - Legs 0-->normal position or relaxed   rFLACC Pain Rating: - Activity 0-->lying quietly, normal position, moves easily   rFLACC Pain Rating: - Cry 0-->no cry (awake or asleep)   rFLACC Pain Rating: - Consolability 1-->reassured by occasional touching, hugging, or being talked to, distractible   rFLACC Score: 2   Pain Assessment Additional Detail   Factors That Aggravate Pain movement   Factors That Relieve Pain repositioning;distraction   Gastrointestinal   GI WDL ex   Abdominal Appearance rounded;distended        Gastrostomy/Enterostomy 08/04/20 1653 Percutaneous endoscopic gastrostomy (PEG) feeding   Placement Date/Time: 08/04/20 1653   Present Prior to Hospital Arrival?: Yes  Type: Percutaneous endoscopic gastrostomy (PEG)  Indication: feeding   Clamp Status/Tolerance clamped   Insertion Site redness;no swelling;no tenderness;no drainage;no warmth   Site Care site cleansed w/ sterile normal saline;skin barrier applied;sterile precut T-slit dressing applied;outer bumper rotated        Colostomy 10/29/20 1349 Loop LLQ   Placement Date/Time: 10/29/20 1349   Present Prior to Hospital Arrival?: No  Inserted by: MD  Colostomy Type: (c) Loop  Location: LLQ   Wound Image    Stomal Appliance Leakage;Changed;2 piece   Stoma Appearance prolapse;red;moist;swollen   Stoma Size (in) 50   Site Assessment Prolapse;Red   Peristomal Assessment Red   Accessories/Skin Care cleansed w/ water;skin sealant   Stoma Function flatus;stool;thick liquid;tan;brown   Treatment Other (Comment)  (rizvi catheter in place for decompression)   Genitourinary   Genitourinary WDL ex   Voiding Characteristics ureteral catheter   Skin   Skin WDL ex   Skin  Color/Characteristics redness blanchable   Skin Temperature warm   Skin Moisture moist   Skin Elasticity slow return to original state   Skin Integrity wound   Specialty Bed/Overlay   (ordered IMMERSE GRAY bed)   Jerry Risk Assessment   Sensory Perception 2-->very limited   Moisture 2-->very moist   Activity 1-->bedfast   Mobility 1-->completely immobile   Nutrition 2-->probably inadequate   Friction and Shear 1-->problem   Jerry Score 9        Negative Pressure Wound Therapy  12/07/20    Placement Date: 12/07/20   Side: (c)   Location: Ischial tuberosity   NPWT Type Vacuum Therapy   Therapy Setting NPWT Continuous therapy   Pressure Setting NPWT 125 mmHg   Therapy Interventions NPWT Y connector;Seal intact   Sponges Inserted NPWT Black;2        Altered Skin Integrity 10/23/20 Left Ischial tuberosity Full thickness tissue loss with exposed bone, tendon, or muscle. Often includes undermining and tunneling. May extend into muscle and/or supporting structures.   Date First Assessed: 10/23/20   Altered Skin Integrity Present on Admission: yes  Side: Left  Location: Ischial tuberosity  Description of Altered Skin Integrity: Full thickness tissue loss with exposed bone, tendon, or muscle. Often includes undermin...   Wound Image    Description of Altered Skin Integrity Full thickness tissue loss with exposed bone, tendon, or muscle. Often includes undermining and tunneling. May extend into muscle and/or supporting structures.   Dressing Appearance Intact;Moist drainage;Saturated   Drainage Amount Moderate   Drainage Characteristics/Odor Serosanguineous   Appearance Red;Yellow;Moist;Not granulating;Slough   Tissue loss description Full thickness   Red (%), Wound Tissue Color 80 %   Yellow (%), Wound Tissue Color 20 %   Periwound Area Redness   Wound Edges Open   Wound Length (cm) 4 cm   Wound Width (cm) 6 cm   Wound Depth (cm) 2.5 cm   Wound Volume (cm^3) 60 cm^3   Wound Surface Area (cm^2) 24 cm^2   Undermining (depth  (cm)/location) 3cm from 9-2 o'clock   Care Cleansed with:;Wound cleanser;Applied:;Skin Barrier   Dressing Applied  (wound vac)   Dressing Change Due 12/09/20        Altered Skin Integrity 10/23/20  Groin Medical adhesive related skin injury   Date First Assessed: 10/23/20   Altered Skin Integrity Present on Admission: yes  Side: (c)   Location: Groin  Primary Wound Type: (c) Medical adhesive related skin injury   Dressing Appearance Open to air   Drainage Amount Scant   Drainage Characteristics/Odor Serous   Appearance Red;Moist   Tissue loss description Partial thickness   Periwound Area Satellite lesion;Redness   Wound Edges Irregular   Care Cleansed with:;Soap and water;Applied:;Skin Barrier        Altered Skin Integrity 11/13/20 Scrotum Full thickness tissue loss. Subcutaneous fat may be visible but bone, tendon or muscle are not exposed   Date First Assessed: 11/13/20   Altered Skin Integrity Present on Admission: yes  Location: Scrotum  Description of Altered Skin Integrity: Full thickness tissue loss. Subcutaneous fat may be visible but bone, tendon or muscle are not exposed   Description of Altered Skin Integrity Full thickness tissue loss. Subcutaneous fat may be visible but bone, tendon or muscle are not exposed  (HEALING)   Dressing Appearance Open to air   Drainage Amount Scant   Drainage Characteristics/Odor Serous   Appearance Pink;Moist   Tissue loss description Full thickness   Periwound Area Scar tissue;Intact   Wound Edges Open   Care Cleansed with:;Sterile normal saline;Applied:;Skin Barrier  (critic aid paste)        Altered Skin Integrity 11/13/20 Left proximal Ear Partial thickness tissue loss. Shallow open ulcer with a red or pink wound bed, without slough. Intact or Open/Ruptured Serum-filled blister.   Date First Assessed: 11/13/20   Altered Skin Integrity Present on Admission: yes  Side: Left  Orientation: proximal  Location: Ear  Description of Altered Skin Integrity: Partial thickness  tissue loss. Shallow open ulcer with a red or pink wound bed, ...   Description of Altered Skin Integrity Partial thickness tissue loss. Shallow open ulcer with a red or pink wound bed, without slough. Intact or Open/Ruptured Serum-filled blister.   Dressing Appearance Open to air   Drainage Amount Scant   Drainage Characteristics/Odor Serous   Appearance Red;Moist   Tissue loss description Partial thickness   Wound Edges Open   Wound Length (cm) 0.5 cm   Wound Width (cm) 0.3 cm   Wound Depth (cm) 0.1 cm   Wound Volume (cm^3) 0.02 cm^3   Wound Surface Area (cm^2) 0.15 cm^2   Care Cleansed with:;Sterile normal saline;Applied:;Skin Barrier   Dressing Foam        Altered Skin Integrity 12/07/20 Right proximal Ear Partial thickness tissue loss. Shallow open ulcer with a red or pink wound bed, without slough. Intact or Open/Ruptured Serum-filled blister.   Date First Assessed: 12/07/20   Altered Skin Integrity Present on Admission: yes  Side: Right  Orientation: proximal  Location: Ear  Description of Altered Skin Integrity: Partial thickness tissue loss. Shallow open ulcer with a red or pink wound bed,...   Description of Altered Skin Integrity Partial thickness tissue loss. Shallow open ulcer with a red or pink wound bed, without slough. Intact or Open/Ruptured Serum-filled blister.   Dressing Appearance Open to air   Drainage Amount Scant   Drainage Characteristics/Odor Serous   Appearance Red;Moist   Tissue loss description Partial thickness   Wound Length (cm) 0.5 cm   Wound Width (cm) 0.5 cm   Wound Depth (cm) 0.1 cm   Wound Volume (cm^3) 0.02 cm^3   Wound Surface Area (cm^2) 0.25 cm^2   Care Cleansed with:;Sterile normal saline;Applied:;Skin Barrier   Dressing Foam        Altered Skin Integrity 10/23/20 Right Ischial tuberosity Full thickness tissue loss with exposed bone, tendon, or muscle. Often includes undermining and tunneling. May extend into muscle and/or supporting structures.   Date First Assessed:  10/23/20   Altered Skin Integrity Present on Admission: yes  Side: Right  Location: Ischial tuberosity  Description of Altered Skin Integrity: Full thickness tissue loss with exposed bone, tendon, or muscle. Often includes undermi...   Wound Image     Description of Altered Skin Integrity Full thickness tissue loss with exposed bone, tendon, or muscle. Often includes undermining and tunneling. May extend into muscle and/or supporting structures.   Dressing Appearance Intact;Moist drainage;Saturated   Drainage Amount Moderate   Drainage Characteristics/Odor Serosanguineous   Appearance Red;Yellow;Moist;Not granulating   Tissue loss description Full thickness   Red (%), Wound Tissue Color 80 %   Yellow (%), Wound Tissue Color 20 %   Periwound Area Satellite lesion;Redness   Wound Edges Open   Wound Length (cm) 4 cm   Wound Width (cm) 7 cm   Wound Depth (cm) 2 cm   Wound Volume (cm^3) 56 cm^3   Wound Surface Area (cm^2) 28 cm^2   Undermining (depth (cm)/location) 3cm from 9-12 o'clock   Care Cleansed with:;Sterile normal saline;Applied:;Skin Barrier   Dressing Applied  (wound vac)   Dressing Change Due 12/09/20   Skin Interventions   Pressure Reduction Devices pressure-redistributing mattress utilized;positioning supports utilized;foam padding utilized   Pressure Reduction Techniques frequent weight shift encouraged;heels elevated off bed;positioned off wounds   Skin Protection adhesive use limited;incontinence pads utilized;skin sealant/moisture barrier applied;silicone foam dressing in place;tubing/devices free from skin contact      12/07/20 1130   Handoff Report   Given To ROBBIN Pierce   Pain/Comfort/Sleep   Preferred Pain Scale rFLACC (Revised Face Legs Arms Cry Consolability Scale)   rFLACC Pain Rating: - Face 1-->occasional grimace or frown, withdrawn, disinterested, appears sad or worried   rFLACC Pain Rating: - Legs 0-->normal position or relaxed   rFLACC Pain Rating: - Activity 0-->lying quietly, normal  position, moves easily   rFLACC Pain Rating: - Cry 0-->no cry (awake or asleep)   rFLACC Pain Rating: - Consolability 1-->reassured by occasional touching, hugging, or being talked to, distractible   rFLACC Score: 2   Pain Assessment Additional Detail   Factors That Aggravate Pain movement   Factors That Relieve Pain repositioning;distraction   Gastrointestinal   GI WDL ex   Abdominal Appearance rounded;distended        Gastrostomy/Enterostomy 08/04/20 1653 Percutaneous endoscopic gastrostomy (PEG) feeding   Placement Date/Time: 08/04/20 1653   Present Prior to Hospital Arrival?: Yes  Type: Percutaneous endoscopic gastrostomy (PEG)  Indication: feeding   Clamp Status/Tolerance clamped   Insertion Site redness;no swelling;no tenderness;no drainage;no warmth   Site Care site cleansed w/ sterile normal saline;skin barrier applied;sterile precut T-slit dressing applied;outer bumper rotated        Colostomy 10/29/20 1349 Loop LLQ   Placement Date/Time: 10/29/20 1349   Present Prior to Hospital Arrival?: No  Inserted by: MD  Colostomy Type: (c) Loop  Location: LLQ   Wound Image    Stomal Appliance Leakage;Changed;2 piece   Stoma Appearance prolapse;red;moist;swollen   Stoma Size (in) 50   Site Assessment Prolapse;Red   Peristomal Assessment Red   Accessories/Skin Care cleansed w/ water;skin sealant   Stoma Function flatus;stool;thick liquid;tan;brown   Treatment Other (Comment)  (rizvi catheter in place for decompression)   Genitourinary   Genitourinary WDL ex   Voiding Characteristics ureteral catheter   Skin   Skin WDL ex   Skin Color/Characteristics redness blanchable   Skin Temperature warm   Skin Moisture moist   Skin Elasticity slow return to original state   Skin Integrity wound   Specialty Bed/Overlay   (ordered IMMERSE GRAY bed)   Jerry Risk Assessment   Sensory Perception 2-->very limited   Moisture 2-->very moist   Activity 1-->bedfast   Mobility 1-->completely immobile   Nutrition 2-->probably inadequate    Friction and Shear 1-->problem   Jerry Score 9        Negative Pressure Wound Therapy  12/07/20    Placement Date: 12/07/20   Side: (c)   Location: Ischial tuberosity   NPWT Type Vacuum Therapy   Therapy Setting NPWT Continuous therapy   Pressure Setting NPWT 125 mmHg   Therapy Interventions NPWT Y connector;Seal intact   Sponges Inserted NPWT Black;2        Altered Skin Integrity 10/23/20 Left Ischial tuberosity Full thickness tissue loss with exposed bone, tendon, or muscle. Often includes undermining and tunneling. May extend into muscle and/or supporting structures.   Date First Assessed: 10/23/20   Altered Skin Integrity Present on Admission: yes  Side: Left  Location: Ischial tuberosity  Description of Altered Skin Integrity: Full thickness tissue loss with exposed bone, tendon, or muscle. Often includes undermin...   Wound Image    Description of Altered Skin Integrity Full thickness tissue loss with exposed bone, tendon, or muscle. Often includes undermining and tunneling. May extend into muscle and/or supporting structures.   Dressing Appearance Intact;Moist drainage;Saturated   Drainage Amount Moderate   Drainage Characteristics/Odor Serosanguineous   Appearance Red;Yellow;Moist;Not granulating;Slough   Tissue loss description Full thickness   Red (%), Wound Tissue Color 80 %   Yellow (%), Wound Tissue Color 20 %   Periwound Area Redness   Wound Edges Open   Wound Length (cm) 4 cm   Wound Width (cm) 6 cm   Wound Depth (cm) 2.5 cm   Wound Volume (cm^3) 60 cm^3   Wound Surface Area (cm^2) 24 cm^2   Undermining (depth (cm)/location) 3cm from 9-2 o'clock   Care Cleansed with:;Wound cleanser;Applied:;Skin Barrier   Dressing Applied  (wound vac)   Dressing Change Due 12/09/20        Altered Skin Integrity 10/23/20  Groin Medical adhesive related skin injury   Date First Assessed: 10/23/20   Altered Skin Integrity Present on Admission: yes  Side: (c)   Location: Groin  Primary Wound Type: (c) Medical adhesive  related skin injury   Dressing Appearance Open to air   Drainage Amount Scant   Drainage Characteristics/Odor Serous   Appearance Red;Moist   Tissue loss description Partial thickness   Periwound Area Satellite lesion;Redness   Wound Edges Irregular   Care Cleansed with:;Soap and water;Applied:;Skin Barrier        Altered Skin Integrity 11/13/20 Scrotum Full thickness tissue loss. Subcutaneous fat may be visible but bone, tendon or muscle are not exposed   Date First Assessed: 11/13/20   Altered Skin Integrity Present on Admission: yes  Location: Scrotum  Description of Altered Skin Integrity: Full thickness tissue loss. Subcutaneous fat may be visible but bone, tendon or muscle are not exposed   Description of Altered Skin Integrity Full thickness tissue loss. Subcutaneous fat may be visible but bone, tendon or muscle are not exposed  (HEALING)   Dressing Appearance Open to air   Drainage Amount Scant   Drainage Characteristics/Odor Serous   Appearance Pink;Moist   Tissue loss description Full thickness   Periwound Area Scar tissue;Intact   Wound Edges Open   Care Cleansed with:;Sterile normal saline;Applied:;Skin Barrier  (critic aid paste)        Altered Skin Integrity 11/13/20 Left proximal Ear Partial thickness tissue loss. Shallow open ulcer with a red or pink wound bed, without slough. Intact or Open/Ruptured Serum-filled blister.   Date First Assessed: 11/13/20   Altered Skin Integrity Present on Admission: yes  Side: Left  Orientation: proximal  Location: Ear  Description of Altered Skin Integrity: Partial thickness tissue loss. Shallow open ulcer with a red or pink wound bed, ...   Description of Altered Skin Integrity Partial thickness tissue loss. Shallow open ulcer with a red or pink wound bed, without slough. Intact or Open/Ruptured Serum-filled blister.   Dressing Appearance Open to air   Drainage Amount Scant   Drainage Characteristics/Odor Serous   Appearance Red;Moist   Tissue loss description  Partial thickness   Wound Edges Open   Wound Length (cm) 0.5 cm   Wound Width (cm) 0.3 cm   Wound Depth (cm) 0.1 cm   Wound Volume (cm^3) 0.02 cm^3   Wound Surface Area (cm^2) 0.15 cm^2   Care Cleansed with:;Sterile normal saline;Applied:;Skin Barrier   Dressing Foam        Altered Skin Integrity 12/07/20 Right proximal Ear Partial thickness tissue loss. Shallow open ulcer with a red or pink wound bed, without slough. Intact or Open/Ruptured Serum-filled blister.   Date First Assessed: 12/07/20   Altered Skin Integrity Present on Admission: yes  Side: Right  Orientation: proximal  Location: Ear  Description of Altered Skin Integrity: Partial thickness tissue loss. Shallow open ulcer with a red or pink wound bed,...   Description of Altered Skin Integrity Partial thickness tissue loss. Shallow open ulcer with a red or pink wound bed, without slough. Intact or Open/Ruptured Serum-filled blister.   Dressing Appearance Open to air   Drainage Amount Scant   Drainage Characteristics/Odor Serous   Appearance Red;Moist   Tissue loss description Partial thickness   Wound Length (cm) 0.5 cm   Wound Width (cm) 0.5 cm   Wound Depth (cm) 0.1 cm   Wound Volume (cm^3) 0.02 cm^3   Wound Surface Area (cm^2) 0.25 cm^2   Care Cleansed with:;Sterile normal saline;Applied:;Skin Barrier   Dressing Foam        Altered Skin Integrity 10/23/20 Right Ischial tuberosity Full thickness tissue loss with exposed bone, tendon, or muscle. Often includes undermining and tunneling. May extend into muscle and/or supporting structures.   Date First Assessed: 10/23/20   Altered Skin Integrity Present on Admission: yes  Side: Right  Location: Ischial tuberosity  Description of Altered Skin Integrity: Full thickness tissue loss with exposed bone, tendon, or muscle. Often includes undermi...   Wound Image     Description of Altered Skin Integrity Full thickness tissue loss with exposed bone, tendon, or muscle. Often includes undermining and tunneling. May  "extend into muscle and/or supporting structures.   Dressing Appearance Intact;Moist drainage;Saturated   Drainage Amount Moderate   Drainage Characteristics/Odor Serosanguineous   Appearance Red;Yellow;Moist;Not granulating   Tissue loss description Full thickness   Red (%), Wound Tissue Color 80 %   Yellow (%), Wound Tissue Color 20 %   Periwound Area Satellite lesion;Redness   Wound Edges Open   Wound Length (cm) 4 cm   Wound Width (cm) 7 cm   Wound Depth (cm) 2 cm   Wound Volume (cm^3) 56 cm^3   Wound Surface Area (cm^2) 28 cm^2   Undermining (depth (cm)/location) 3cm from 9-12 o'clock   Care Cleansed with:;Sterile normal saline;Applied:;Skin Barrier   Dressing Applied  (wound vac)   Dressing Change Due 12/09/20   Skin Interventions   Pressure Reduction Devices pressure-redistributing mattress utilized;positioning supports utilized;foam padding utilized   Pressure Reduction Techniques frequent weight shift encouraged;heels elevated off bed;positioned off wounds   Skin Protection adhesive use limited;incontinence pads utilized;skin sealant/moisture barrier applied;silicone foam dressing in place;tubing/devices free from skin contact     Consulted on this 24 y/o M patient for wound and ostomy care. 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 He is a resident at [facility].   He is awake and alert, nonverbal. 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 Skin assessed.    Present on admission Stage 2 pressure injuries noted to bilateral proximal ear folds, moist red wound beds, foam dressings applied.     Present on admission healing stage 3 pressure injury to scrotum noted, small open area still noted, full thickness tissue loss, scar tissue to surrounding skin. Thin layer critic aid paste moisture barrier applied. Elevated with rolled wash " cloth.     Present on admission bilateral ischium stage 4 pressure injuries noted. Right ischium wound measures [id number] with 3cm undermining noted from 9-12 o'clock. Wound bed moist red, nongranulating tissue 80%, 20% moist yellow slough. Left ischium wound measures 4x6x2.5cm undermining noted from 9-2 o'clock. Wound bed moist red, nongranulating tissue 80%, 20% moist yellow slough. Wounds cleansed with saline and patted dry. Rodrigo wound skin painted with cavilon skin barrier. Ostomy rings applied to wound edges and window paned with drape. One piece black foam cut to size and applied into each wound (two pieces total) to fill including undermined space, and sealed with drape. Track pads applied bilaterally and attached via Y-connector to I wound vac ULTA at continuous -125 mmHg low intensity suction with good seal. Patient then positioned with right tilt with pillow support.    Colostomy to LLQ noted with prolapse, large edematous loop stoma. Rizvi catheter in for decompression. Wound pouch in use from [facility], leaking currently. Removed and skin cleansed with aloe vesta foaming cleanser, patted dry. Prepped with cavilon. Two piece LARGE wafer josé miguel ostomy pouch with tap closure applied at this time, rizvi for decompression maintained. Pouch attached to gravity drainage bag. Dr. Ortez on consult, planning to perform ostomy revision tomorrow in OR.   MASD with suspected yeast noted to rodrigo-stomal skin as well as noted to abdomen, buttock, bilateral groins, medial thighs. Moist red, satellite lesions, scattered areas of partial thickness. Recommend systemic and topical antifungal, message to Dr. Bobo. Will recommend crusting rodrigo-stomal and rodrigo-wound skin with miconazole powder and cavilon prior to next appliance and wound vac application.    Red tube to LUQ again noted, mild rodrigo-tube MASD noted with redness and partial thickenss tissue loss. Cleansed with saline and patted dry. Critic aid paste applied to  "site and T-slit nonbordered foam dressing applied.    Specialty [employer] GRAY IMMERSE bed ordered for patient at this time. Please place patient on bed once delivered.   Turn q2h with pillow support  Elevate heels off mattress with rolled towels  Pad skin below medical devices/tubing     Stage 2 pressure injuries to bilateral proximal ears:  1. Cleanse with saline  2. Pat dry  3. Paint with cavilon  4. Apply foam dressings  5. Change weekly    Stage 4 pressure injuries bilateral ischium - maintain wound vac dressings    Healing stage 3 pressure injury scrotum - cleanse with bath wipes gently, apply thin layer critic aid paste moisture barrier BID and prn, elevate scrotum with rolled wash cloth.    Colostomy - Please review Dedra's procedure "Pouching : Colostomy or Ileostomy" for instructions on ostomy.stoma care. Change ostomy appliance weekly or IMMEDIATELY IF LEAKING. All ostomy care supplies can be requested from materials management.    OSTOMY CARE SUPPLIES:  One Piece Flat [place] Pouch #[id number]   Brava Stoma Ring #[id number]   Cavilon Spray #[id number]           "

## 2020-12-07 NOTE — CONSULTS
"  [facility]  Adult Nutrition  Consult Note    SUMMARY     Recommendations    Recommendation:   1. When medically appropriate, inititate enteral nutrition of Isosource 1.5 @ 15mL/hr and advance as tolerated to goal rate of 30mL/hr daily with continuous water flushes @ 20mL/hr (or Per MD)  + 1 packet of bene protein daily.   * This provides 720mL total volume; 1080 calories; 55gm protein; 560mL Free water + 480mL additional water from flushes   2. Weekly weights.   3. RD to follow up.     Goals: EN initiation within the next 48 hours-pending surgery   Nutrition Goal Status: new  Communication of RD Recs: (Plan of Care)    Reason for Assessment    Reason For Assessment: consult  Diagnosis: (S/P colostomy)  Relevant Medical History: cerebal palsy, seizures, GERD, Stage IV Decubitis Ulcer    General Information Comments: RD consulted for Enteral nutrition recommendations for PEG tube, and to assess nutrition needs due to multiple wounds. Pt with anticipated colostomy revision tomorrow. RD providing EN recommendations to initiate as soon as it is medically appropriate. He meets criteria for malnutrition. LBM today with 1100mL stool noted per documentation.     Nutrition Discharge Planning: Discharge goal: pending medical course (most likely bolus feeds).     Nutrition Risk Screen    Nutrition Risk Screen: tube feeding or parenteral nutrition    Nutrition/Diet History    Spiritual, Cultural Beliefs, Evangelical Practices, Values that Affect Care: no  Food Allergies: NKFA    Anthropometrics    Temp: 97.7 °F (36.5 °C)  Height: 4' 6" (137.2 cm)(estimate from mom )  Height (inches): 54 in  Weight Method: Bed Scale  Weight: 32.2 kg (70 lb 15.8 oz)  Weight (lb): 70.99 lb  Ideal Body Weight (IBW), Male: 70 lb  % Ideal Body Weight, Male (lb): 101.41 %  BMI (Calculated): 17.1  BMI Grade: 16 - 16.9 protein-energy malnutrition grade II       Lab/Procedures/Meds    Pertinent Labs Reviewed: reviewed  Pertinent Labs " Comments: K 2.8L; BUN 33H; Ca 8.2L  Pertinent Medications Reviewed: reviewed  Pertinent Medications Comments: .     Physical Findings/Assessment     Multiple wounds noted; Altered skin integrity of L ischial tuberosity, Groin, Neck, incision site to abdomen     Estimated/Assessed Needs    Weight Used For Calorie Calculations: 32.2 kg (70 lb 15.8 oz)  Energy Calorie Requirements (kcal): 1070  Energy Need Method: [place]-St Jeor  Protein Requirements: 42-59gm(1.0-1.4gm/kg )  Weight Used For Protein Calculations: 42 kg (92 lb 9.5 oz)(IBW)  Fluid Requirements (mL): 1070  Estimated Fluid Requirement Method: RDA Method(or PER MD )  RDA Method (mL): 1070  CHO Requirement: 134      Nutrition Prescription Ordered    Current Diet Order: NPO.   Nutrition Order Comments: PEG tube; feedings on hold due to pending surgery.     Evaluation of Received Nutrient/Fluid Intake          Intake/Output Summary (Last 24 hours) at 12/7/2020 2578  Last data filed at 12/7/2020 1405  Gross per 24 hour   Intake 0 ml   Output 2600 ml   Net -2600 ml       % Intake of Estimated Energy Needs: 0 - 25 %  % Meal Intake: NPO    Nutrition Risk    Level of Risk/Frequency of Follow-up: (2x week)     Assessment and Plan    Severe malnutrition  Nutrition Problem:  (Severe) Protein-Calorie Malnutrition  Malnutrition in the context of Chronic Illness/Injury, Acute Illness/Injury and Social/Environmental Circumstances    Related to (etiology):  Decreased ability to consume sufficient energy; alteration in GI structure and function.     Signs and Symptoms (as evidenced by):  Energy Intake: <50% of estimated energy requirement for 3 days (RD predicts prior to admission) with TF on hold  Body Fat Depletion: severe depletion of orbitals and triceps   Muscle Mass Depletion: severe depletion of temples, clavicle region, scapular region, interosseous muscle and lower extremities   Weight Loss: None per EMR; malnutrition criteria met per BMI @ 17.12kg/m^2  Fluid  Accumulation: NICOLA  Interventions(treatment strategy):  Nutrition Prescription-enteral nutrition   Medical food supplement-beneprotein (wounds)  Collaboration with other providers  Nutrition Diagnosis Status:  New           Monitor and Evaluation    Food and Nutrient Intake: energy intake  Food and Nutrient Adminstration: enteral and parenteral nutrition administration  Anthropometric Measurements: weight  Biochemical Data, Medical Tests and Procedures: electrolyte and renal panel, gastrointestinal profile, glucose/endocrine profile, inflammatory profile, lipid profile  Nutrition-Focused Physical Findings: overall appearance     Malnutrition Assessment  Malnutrition Type: acute illness or injury, chronic illness, social/environmental circumstances  Hair/Scalp (Micronutrient): brittle, sparse           Orbital Region (Subcutaneous Fat Loss): severe depletion  Upper Arm Region (Subcutaneous Fat Loss): severe depletion   Voodoo Region (Muscle Loss): severe depletion  Clavicle Bone Region (Muscle Loss): severe depletion  Clavicle and Acromion Bone Region (Muscle Loss): severe depletion       Subcutaneous Fat Loss (Final Summary): severe protein-calorie malnutrition         Nutrition Follow-Up    RD Follow-up?: Yes     Shakila Hudson RD, PRIMITIVO

## 2020-12-07 NOTE — PROGRESS NOTES
[facility]  Colorectal Surgery  Progress Note    Subjective:     History of Present Illness:  26yo M with CP and extensive osteomyelitis with decubitus ulcer who is s/p lap loop sigmoid colostomy construction on 10/29/2020 for fecal diversion. Also s/p trach/PEG. Represented to hospital with colostomy prolapse. Iglesias placed into stoma for decompression which released large amount of gas/stool. Surgery consulted for evaluation    Post-Op Info:  Procedure(s) (LRB):  REVISION, COLOSTOMY (N/A)         Interval History: Good stoma output overnight.     Medications:  Continuous Infusions:   custom IV infusion builder 100 mL/hr at 12/07/20 0745     Scheduled Meds:   baclofen  10 mg Per G Tube TID    ceFEPime (MAXIPIME) IVPB  2 g Intravenous Q8H    gabapentin  250 mg Per G Tube QHS    linezolid  600 mg Intravenous Q12H    metronidazole  500 mg Intravenous Q8H    PHENobarbitaL  100 mg Per G Tube QHS    propranoloL  20 mg Per G Tube TID     PRN Meds:sodium chloride, lorazepam, melatonin     Review of patient's allergies indicates:   Allergen Reactions    Morphine sulfate Hives     Objective:     Vital Signs (Most Recent):  Temp: 97.9 °F (36.6 °C) (12/07/20 0737)  Pulse: 94 (12/07/20 0737)  Resp: 14 (12/07/20 0737)  BP: 122/84 (12/07/20 0737)  SpO2: 100 % (12/07/20 0737) Vital Signs (24h Range):  Temp:  [97.8 °F (36.6 °C)-100.2 °F (37.9 °C)] 97.9 °F (36.6 °C)  Pulse:  [] 94  Resp:  [14-26] 14  SpO2:  [99 %-100 %] 100 %  BP: (105-137)/(59-93) 122/84     Weight: 32.2 kg (70 lb 15.8 oz)  Body mass index is 17.12 kg/m².    Intake/Output - Last 3 Shifts       12/05 0700 - 12/06 0659 12/06 0700 - 12/07 0659 12/07 0700 - 12/08 0659    Urine (mL/kg/hr)  1500     Stool  1100     Total Output  2600     Net  -2600                  Physical Exam  Constitutional:       General: He is not in acute distress.     Appearance: He is well-developed. He is not ill-appearing, toxic-appearing or diaphoretic.   HENT:       Right Ear: External ear normal.      Left Ear: External ear normal.      Nose: Nose normal.   Eyes:      General: No scleral icterus.  Neck:      Musculoskeletal: Normal range of motion and neck supple.      Thyroid: No thyromegaly.   Cardiovascular:      Rate and Rhythm: Normal rate and regular rhythm.      Heart sounds: Normal heart sounds. No murmur.   Pulmonary:      Effort: Pulmonary effort is normal.      Breath sounds: Normal breath sounds.   Abdominal:      Comments: Soft, mild distention; slight ostomy prolapse and edematous with rizvi in proximal limb with liquid stool in bag   Musculoskeletal:      Comments: Extensive decubitus ulcer   Lymphadenopathy:      Cervical: No cervical adenopathy.   Skin:     General: Skin is warm and dry.   Neurological:      Comments: Nonverbal       Significant Labs:  CBC:   Recent Labs   Lab 12/06/20  1135   WBC 15.59*   RBC 2.48*   HGB 6.9*   HCT 23.6*   *   MCV 95   MCH 27.8   MCHC 29.2*     BMP:   Recent Labs   Lab 12/07/20  0051         K 2.5*   *   CO2 11*   BUN 35*   CREATININE 0.6   CALCIUM 8.5*       Significant Diagnostics:  I have reviewed all pertinent imaging results/findings within the past 24 hours.    Assessment/Plan:     * Status post colostomy  S/p lap loop sigmoid colostomy with evidence of prolapse and edema with obstructive symptoms on presentation requiring rizvi decompression    - Will likely need colostomy revision while here. Will likely plan for revision tomorrow in OR and may convert to end colostomy. Will allow for 24hrs more of decompression  - continue rizvi into proximal limb  - okay for diet today, NPO at MN (no TFs after MN)  - rest of care per primary team    Decubitus ulcer of ischial area, left, stage IV  Care per primary team    UTI (urinary tract infection)  Care per primary team    Anemia, unspecified  Care per primary team    Seizure disorder  Care per primary team    Cerebral palsy  Care per primary  team        Michael Ortez MD  Colorectal Surgery  [facility]

## 2020-12-07 NOTE — ASSESSMENT & PLAN NOTE
S/p lap loop sigmoid colostomy with evidence of prolapse and edema with obstructive symptoms on presentation requiring rizvi decompression    - Will likely need colostomy revision while here. Will likely plan for revision tomorrow in OR and may convert to end colostomy. Will allow for 24hrs more of decompression  - continue rizvi into proximal limb  - okay for diet today, NPO at MN (no TFs after MN)  - rest of care per primary team

## 2020-12-08 ENCOUNTER — ANESTHESIA EVENT (OUTPATIENT)
Dept: SURGERY | Facility: HOSPITAL | Age: 25
DRG: 329 | End: 2020-12-08
Payer: MEDICAID

## 2020-12-08 ENCOUNTER — ANESTHESIA (OUTPATIENT)
Dept: SURGERY | Facility: HOSPITAL | Age: 25
DRG: 329 | End: 2020-12-08
Payer: MEDICAID

## 2020-12-08 PROBLEM — K94.09 COLOSTOMY PROLAPSE: Status: ACTIVE | Noted: 2020-12-08

## 2020-12-08 LAB
ANION GAP SERPL CALC-SCNC: 11 MMOL/L (ref 8–16)
ANION GAP SERPL CALC-SCNC: 11 MMOL/L (ref 8–16)
ANION GAP SERPL CALC-SCNC: 12 MMOL/L (ref 8–16)
ANION GAP SERPL CALC-SCNC: 9 MMOL/L (ref 8–16)
ANISOCYTOSIS BLD QL SMEAR: SLIGHT
BACTERIA UR CULT: ABNORMAL
BASOPHILS # BLD AUTO: 0.02 K/UL (ref 0–0.2)
BASOPHILS NFR BLD: 0.2 % (ref 0–1.9)
BUN SERPL-MCNC: 23 MG/DL (ref 6–20)
BUN SERPL-MCNC: 23 MG/DL (ref 6–20)
BUN SERPL-MCNC: 24 MG/DL (ref 6–20)
BUN SERPL-MCNC: 25 MG/DL (ref 6–20)
CALCIUM SERPL-MCNC: 7.6 MG/DL (ref 8.7–10.5)
CALCIUM SERPL-MCNC: 7.6 MG/DL (ref 8.7–10.5)
CALCIUM SERPL-MCNC: 7.9 MG/DL (ref 8.7–10.5)
CALCIUM SERPL-MCNC: 8 MG/DL (ref 8.7–10.5)
CHLORIDE SERPL-SCNC: 105 MMOL/L (ref 95–110)
CHLORIDE SERPL-SCNC: 105 MMOL/L (ref 95–110)
CHLORIDE SERPL-SCNC: 111 MMOL/L (ref 95–110)
CHLORIDE SERPL-SCNC: 112 MMOL/L (ref 95–110)
CO2 SERPL-SCNC: 16 MMOL/L (ref 23–29)
CO2 SERPL-SCNC: 20 MMOL/L (ref 23–29)
CREAT SERPL-MCNC: 0.6 MG/DL (ref 0.5–1.4)
DIFFERENTIAL METHOD: ABNORMAL
EOSINOPHIL # BLD AUTO: 0.5 K/UL (ref 0–0.5)
EOSINOPHIL NFR BLD: 3.7 % (ref 0–8)
ERYTHROCYTE [DISTWIDTH] IN BLOOD BY AUTOMATED COUNT: 19.1 % (ref 11.5–14.5)
EST. GFR  (AFRICAN AMERICAN): >60 ML/MIN/1.73 M^2
EST. GFR  (NON AFRICAN AMERICAN): >60 ML/MIN/1.73 M^2
GLUCOSE SERPL-MCNC: 72 MG/DL (ref 70–110)
GLUCOSE SERPL-MCNC: 72 MG/DL (ref 70–110)
GLUCOSE SERPL-MCNC: 77 MG/DL (ref 70–110)
GLUCOSE SERPL-MCNC: 82 MG/DL (ref 70–110)
HCT VFR BLD AUTO: 36.4 % (ref 40–54)
HGB BLD-MCNC: 11.1 G/DL (ref 14–18)
IMM GRANULOCYTES # BLD AUTO: 0.08 K/UL (ref 0–0.04)
IMM GRANULOCYTES NFR BLD AUTO: 0.6 % (ref 0–0.5)
LYMPHOCYTES # BLD AUTO: 2 K/UL (ref 1–4.8)
LYMPHOCYTES NFR BLD: 15.6 % (ref 18–48)
MAGNESIUM SERPL-MCNC: 2.5 MG/DL (ref 1.6–2.6)
MCH RBC QN AUTO: 28 PG (ref 27–31)
MCHC RBC AUTO-ENTMCNC: 30.5 G/DL (ref 32–36)
MCV RBC AUTO: 92 FL (ref 82–98)
MONOCYTES # BLD AUTO: 2.1 K/UL (ref 0.3–1)
MONOCYTES NFR BLD: 16.6 % (ref 4–15)
NEUTROPHILS # BLD AUTO: 8 K/UL (ref 1.8–7.7)
NEUTROPHILS NFR BLD: 63.3 % (ref 38–73)
NRBC BLD-RTO: 0 /100 WBC
OVALOCYTES BLD QL SMEAR: ABNORMAL
PHOSPHATE SERPL-MCNC: 2.9 MG/DL (ref 2.7–4.5)
PLATELET # BLD AUTO: 482 K/UL (ref 150–350)
PLATELET BLD QL SMEAR: ABNORMAL
PMV BLD AUTO: 9.4 FL (ref 9.2–12.9)
POIKILOCYTOSIS BLD QL SMEAR: SLIGHT
POTASSIUM SERPL-SCNC: 2.4 MMOL/L (ref 3.5–5.1)
POTASSIUM SERPL-SCNC: 2.6 MMOL/L (ref 3.5–5.1)
POTASSIUM SERPL-SCNC: 4.5 MMOL/L (ref 3.5–5.1)
POTASSIUM SERPL-SCNC: 4.5 MMOL/L (ref 3.5–5.1)
RBC # BLD AUTO: 3.97 M/UL (ref 4.6–6.2)
SODIUM SERPL-SCNC: 136 MMOL/L (ref 136–145)
SODIUM SERPL-SCNC: 136 MMOL/L (ref 136–145)
SODIUM SERPL-SCNC: 140 MMOL/L (ref 136–145)
SODIUM SERPL-SCNC: 140 MMOL/L (ref 136–145)
WBC # BLD AUTO: 12.65 K/UL (ref 3.9–12.7)

## 2020-12-08 PROCEDURE — 25000003 PHARM REV CODE 250: Performed by: COLON & RECTAL SURGERY

## 2020-12-08 PROCEDURE — 71000033 HC RECOVERY, INTIAL HOUR: Performed by: COLON & RECTAL SURGERY

## 2020-12-08 PROCEDURE — 63600175 PHARM REV CODE 636 W HCPCS: Performed by: COLON & RECTAL SURGERY

## 2020-12-08 PROCEDURE — 37000009 HC ANESTHESIA EA ADD 15 MINS: Performed by: COLON & RECTAL SURGERY

## 2020-12-08 PROCEDURE — S0030 INJECTION, METRONIDAZOLE: HCPCS | Performed by: FAMILY MEDICINE

## 2020-12-08 PROCEDURE — S0030 INJECTION, METRONIDAZOLE: HCPCS | Performed by: COLON & RECTAL SURGERY

## 2020-12-08 PROCEDURE — 96376 TX/PRO/DX INJ SAME DRUG ADON: CPT

## 2020-12-08 PROCEDURE — 36415 COLL VENOUS BLD VENIPUNCTURE: CPT

## 2020-12-08 PROCEDURE — 84100 ASSAY OF PHOSPHORUS: CPT

## 2020-12-08 PROCEDURE — 63600175 PHARM REV CODE 636 W HCPCS: Performed by: INTERNAL MEDICINE

## 2020-12-08 PROCEDURE — 25000003 PHARM REV CODE 250: Performed by: FAMILY MEDICINE

## 2020-12-08 PROCEDURE — 63600175 PHARM REV CODE 636 W HCPCS: Performed by: FAMILY MEDICINE

## 2020-12-08 PROCEDURE — 80048 BASIC METABOLIC PNL TOTAL CA: CPT | Mod: 91

## 2020-12-08 PROCEDURE — 27201423 OPTIME MED/SURG SUP & DEVICES STERILE SUPPLY: Performed by: COLON & RECTAL SURGERY

## 2020-12-08 PROCEDURE — 94761 N-INVAS EAR/PLS OXIMETRY MLT: CPT

## 2020-12-08 PROCEDURE — 36000709 HC OR TIME LEV III EA ADD 15 MIN: Performed by: COLON & RECTAL SURGERY

## 2020-12-08 PROCEDURE — 44345 REVISION OF COLOSTOMY: CPT | Mod: 78,,, | Performed by: COLON & RECTAL SURGERY

## 2020-12-08 PROCEDURE — 99024 PR POST-OP FOLLOW-UP VISIT: ICD-10-PCS | Mod: ,,, | Performed by: COLON & RECTAL SURGERY

## 2020-12-08 PROCEDURE — 80048 BASIC METABOLIC PNL TOTAL CA: CPT

## 2020-12-08 PROCEDURE — 83735 ASSAY OF MAGNESIUM: CPT

## 2020-12-08 PROCEDURE — 88307 PR  SURG PATH,LEVEL V: ICD-10-PCS | Mod: 26,,, | Performed by: PATHOLOGY

## 2020-12-08 PROCEDURE — 88307 TISSUE EXAM BY PATHOLOGIST: CPT | Mod: 26,,, | Performed by: PATHOLOGY

## 2020-12-08 PROCEDURE — 99900026 HC AIRWAY MAINTENANCE (STAT)

## 2020-12-08 PROCEDURE — 85025 COMPLETE CBC W/AUTO DIFF WBC: CPT

## 2020-12-08 PROCEDURE — 27000221 HC OXYGEN, UP TO 24 HOURS

## 2020-12-08 PROCEDURE — 44345 PR REVISION OF COLOSTOMY,COMPLICATED: ICD-10-PCS | Mod: 78,,, | Performed by: COLON & RECTAL SURGERY

## 2020-12-08 PROCEDURE — 63600175 PHARM REV CODE 636 W HCPCS: Performed by: NURSE ANESTHETIST, CERTIFIED REGISTERED

## 2020-12-08 PROCEDURE — 37000008 HC ANESTHESIA 1ST 15 MINUTES: Performed by: COLON & RECTAL SURGERY

## 2020-12-08 PROCEDURE — 88307 TISSUE EXAM BY PATHOLOGIST: CPT | Performed by: PATHOLOGY

## 2020-12-08 PROCEDURE — 11000001 HC ACUTE MED/SURG PRIVATE ROOM

## 2020-12-08 PROCEDURE — 25000003 PHARM REV CODE 250: Performed by: NURSE ANESTHETIST, CERTIFIED REGISTERED

## 2020-12-08 PROCEDURE — 21400001 HC TELEMETRY ROOM

## 2020-12-08 PROCEDURE — 36000708 HC OR TIME LEV III 1ST 15 MIN: Performed by: COLON & RECTAL SURGERY

## 2020-12-08 PROCEDURE — 99900035 HC TECH TIME PER 15 MIN (STAT)

## 2020-12-08 PROCEDURE — 99024 POSTOP FOLLOW-UP VISIT: CPT | Mod: ,,, | Performed by: COLON & RECTAL SURGERY

## 2020-12-08 PROCEDURE — 63600175 PHARM REV CODE 636 W HCPCS: Performed by: NURSE PRACTITIONER

## 2020-12-08 RX ORDER — ROCURONIUM BROMIDE 10 MG/ML
INJECTION, SOLUTION INTRAVENOUS
Status: DISCONTINUED | OUTPATIENT
Start: 2020-12-08 | End: 2020-12-08

## 2020-12-08 RX ORDER — POTASSIUM CHLORIDE 7.45 MG/ML
10 INJECTION INTRAVENOUS
Status: COMPLETED | OUTPATIENT
Start: 2020-12-08 | End: 2020-12-08

## 2020-12-08 RX ORDER — BUPIVACAINE HYDROCHLORIDE 2.5 MG/ML
INJECTION, SOLUTION EPIDURAL; INFILTRATION; INTRACAUDAL
Status: DISCONTINUED | OUTPATIENT
Start: 2020-12-08 | End: 2020-12-08 | Stop reason: HOSPADM

## 2020-12-08 RX ORDER — PROPOFOL 10 MG/ML
VIAL (ML) INTRAVENOUS
Status: DISCONTINUED | OUTPATIENT
Start: 2020-12-08 | End: 2020-12-08

## 2020-12-08 RX ORDER — SODIUM CHLORIDE, SODIUM LACTATE, POTASSIUM CHLORIDE, CALCIUM CHLORIDE 600; 310; 30; 20 MG/100ML; MG/100ML; MG/100ML; MG/100ML
INJECTION, SOLUTION INTRAVENOUS CONTINUOUS PRN
Status: DISCONTINUED | OUTPATIENT
Start: 2020-12-08 | End: 2020-12-08

## 2020-12-08 RX ORDER — ONDANSETRON 2 MG/ML
INJECTION INTRAMUSCULAR; INTRAVENOUS
Status: DISCONTINUED | OUTPATIENT
Start: 2020-12-08 | End: 2020-12-08

## 2020-12-08 RX ORDER — ACETAMINOPHEN 650 MG/20.3ML
650 LIQUID ORAL EVERY 6 HOURS
Status: DISCONTINUED | OUTPATIENT
Start: 2020-12-08 | End: 2020-12-09

## 2020-12-08 RX ORDER — FENTANYL CITRATE 50 UG/ML
INJECTION, SOLUTION INTRAMUSCULAR; INTRAVENOUS
Status: DISCONTINUED | OUTPATIENT
Start: 2020-12-08 | End: 2020-12-08

## 2020-12-08 RX ORDER — HYDROMORPHONE HYDROCHLORIDE 2 MG/ML
0.2 INJECTION, SOLUTION INTRAMUSCULAR; INTRAVENOUS; SUBCUTANEOUS EVERY 5 MIN PRN
Status: DISCONTINUED | OUTPATIENT
Start: 2020-12-08 | End: 2020-12-08 | Stop reason: HOSPADM

## 2020-12-08 RX ORDER — ACETAMINOPHEN 10 MG/ML
INJECTION, SOLUTION INTRAVENOUS
Status: DISCONTINUED | OUTPATIENT
Start: 2020-12-08 | End: 2020-12-08

## 2020-12-08 RX ORDER — METRONIDAZOLE 500 MG/100ML
500 INJECTION, SOLUTION INTRAVENOUS ONCE
Status: DISCONTINUED | OUTPATIENT
Start: 2020-12-08 | End: 2020-12-08

## 2020-12-08 RX ORDER — POTASSIUM CHLORIDE 14.9 MG/ML
20 INJECTION INTRAVENOUS
Status: DISCONTINUED | OUTPATIENT
Start: 2020-12-08 | End: 2020-12-08

## 2020-12-08 RX ORDER — ONDANSETRON 2 MG/ML
4 INJECTION INTRAMUSCULAR; INTRAVENOUS DAILY PRN
Status: DISCONTINUED | OUTPATIENT
Start: 2020-12-08 | End: 2020-12-08 | Stop reason: HOSPADM

## 2020-12-08 RX ORDER — LIDOCAINE HYDROCHLORIDE 10 MG/ML
INJECTION, SOLUTION EPIDURAL; INFILTRATION; INTRACAUDAL; PERINEURAL
Status: DISCONTINUED | OUTPATIENT
Start: 2020-12-08 | End: 2020-12-08

## 2020-12-08 RX ORDER — MIDAZOLAM HYDROCHLORIDE 1 MG/ML
INJECTION INTRAMUSCULAR; INTRAVENOUS
Status: DISCONTINUED | OUTPATIENT
Start: 2020-12-08 | End: 2020-12-08

## 2020-12-08 RX ORDER — PHENYLEPHRINE HYDROCHLORIDE 10 MG/ML
INJECTION INTRAVENOUS
Status: DISCONTINUED | OUTPATIENT
Start: 2020-12-08 | End: 2020-12-08

## 2020-12-08 RX ORDER — DEXAMETHASONE SODIUM PHOSPHATE 4 MG/ML
INJECTION, SOLUTION INTRA-ARTICULAR; INTRALESIONAL; INTRAMUSCULAR; INTRAVENOUS; SOFT TISSUE
Status: DISCONTINUED | OUTPATIENT
Start: 2020-12-08 | End: 2020-12-08

## 2020-12-08 RX ORDER — POTASSIUM CHLORIDE 7.45 MG/ML
10 INJECTION INTRAVENOUS
Status: DISCONTINUED | OUTPATIENT
Start: 2020-12-08 | End: 2020-12-08

## 2020-12-08 RX ADMIN — PROPRANOLOL HYDROCHLORIDE 20 MG: 20 TABLET ORAL at 03:12

## 2020-12-08 RX ADMIN — ACETAMINOPHEN 650 MG: 160 SOLUTION ORAL at 11:12

## 2020-12-08 RX ADMIN — METRONIDAZOLE 500 MG: 500 INJECTION, SOLUTION INTRAVENOUS at 03:12

## 2020-12-08 RX ADMIN — ACETAMINOPHEN 1000 MG: 10 INJECTION, SOLUTION INTRAVENOUS at 10:12

## 2020-12-08 RX ADMIN — FENTANYL CITRATE 25 MCG: 50 INJECTION, SOLUTION INTRAMUSCULAR; INTRAVENOUS at 10:12

## 2020-12-08 RX ADMIN — GABAPENTIN 250 MG: 250 SUSPENSION ORAL at 10:12

## 2020-12-08 RX ADMIN — PROPOFOL 50 MG: 10 INJECTION, EMULSION INTRAVENOUS at 09:12

## 2020-12-08 RX ADMIN — ACETAMINOPHEN 650 MG: 160 SOLUTION ORAL at 05:12

## 2020-12-08 RX ADMIN — ROCURONIUM BROMIDE 10 MG: 10 INJECTION, SOLUTION INTRAVENOUS at 10:12

## 2020-12-08 RX ADMIN — POTASSIUM CHLORIDE 10 MEQ: 7.46 INJECTION, SOLUTION INTRAVENOUS at 02:12

## 2020-12-08 RX ADMIN — CEFEPIME HYDROCHLORIDE 2 G: 2 INJECTION, SOLUTION INTRAVENOUS at 10:12

## 2020-12-08 RX ADMIN — POTASSIUM CHLORIDE 10 MEQ: 7.46 INJECTION, SOLUTION INTRAVENOUS at 01:12

## 2020-12-08 RX ADMIN — LINEZOLID 600 MG: 600 INJECTION, SOLUTION INTRAVENOUS at 02:12

## 2020-12-08 RX ADMIN — POTASSIUM CHLORIDE 10 MEQ: 7.46 INJECTION, SOLUTION INTRAVENOUS at 12:12

## 2020-12-08 RX ADMIN — Medication 6 MG: at 10:12

## 2020-12-08 RX ADMIN — ROCURONIUM BROMIDE 30 MG: 10 INJECTION, SOLUTION INTRAVENOUS at 09:12

## 2020-12-08 RX ADMIN — METRONIDAZOLE 500 MG: 500 INJECTION, SOLUTION INTRAVENOUS at 11:12

## 2020-12-08 RX ADMIN — SODIUM CHLORIDE, SODIUM LACTATE, POTASSIUM CHLORIDE, AND CALCIUM CHLORIDE: 600; 310; 30; 20 INJECTION, SOLUTION INTRAVENOUS at 09:12

## 2020-12-08 RX ADMIN — POTASSIUM CHLORIDE 10 MEQ: 7.46 INJECTION, SOLUTION INTRAVENOUS at 03:12

## 2020-12-08 RX ADMIN — FENTANYL CITRATE 50 MCG: 50 INJECTION, SOLUTION INTRAMUSCULAR; INTRAVENOUS at 09:12

## 2020-12-08 RX ADMIN — MIDAZOLAM HYDROCHLORIDE 2 MG: 1 INJECTION, SOLUTION INTRAMUSCULAR; INTRAVENOUS at 09:12

## 2020-12-08 RX ADMIN — CEFEPIME HYDROCHLORIDE 2 G: 2 INJECTION, SOLUTION INTRAVENOUS at 02:12

## 2020-12-08 RX ADMIN — BACLOFEN 10 MG: 10 TABLET ORAL at 08:12

## 2020-12-08 RX ADMIN — BACLOFEN 10 MG: 10 TABLET ORAL at 03:12

## 2020-12-08 RX ADMIN — ONDANSETRON 4 MG: 2 INJECTION, SOLUTION INTRAMUSCULAR; INTRAVENOUS at 10:12

## 2020-12-08 RX ADMIN — LINEZOLID 600 MG: 600 INJECTION, SOLUTION INTRAVENOUS at 03:12

## 2020-12-08 RX ADMIN — METRONIDAZOLE 500 MG: 500 INJECTION, SOLUTION INTRAVENOUS at 12:12

## 2020-12-08 RX ADMIN — FENTANYL CITRATE 50 MCG: 50 INJECTION, SOLUTION INTRAMUSCULAR; INTRAVENOUS at 10:12

## 2020-12-08 RX ADMIN — SUGAMMADEX 200 MG: 100 INJECTION, SOLUTION INTRAVENOUS at 10:12

## 2020-12-08 RX ADMIN — BACLOFEN 10 MG: 10 TABLET ORAL at 10:12

## 2020-12-08 RX ADMIN — PROPRANOLOL HYDROCHLORIDE 20 MG: 20 TABLET ORAL at 10:12

## 2020-12-08 RX ADMIN — PROPRANOLOL HYDROCHLORIDE 20 MG: 20 TABLET ORAL at 08:12

## 2020-12-08 RX ADMIN — METRONIDAZOLE 500 MG: 500 INJECTION, SOLUTION INTRAVENOUS at 09:12

## 2020-12-08 RX ADMIN — DEXAMETHASONE SODIUM PHOSPHATE 4 MG: 4 INJECTION, SOLUTION INTRAMUSCULAR; INTRAVENOUS at 09:12

## 2020-12-08 RX ADMIN — LIDOCAINE HYDROCHLORIDE 50 MG: 10 INJECTION, SOLUTION EPIDURAL; INFILTRATION; INTRACAUDAL; PERINEURAL at 09:12

## 2020-12-08 RX ADMIN — METRONIDAZOLE 500 MG: 500 INJECTION, SOLUTION INTRAVENOUS at 08:12

## 2020-12-08 RX ADMIN — CEFEPIME HYDROCHLORIDE 2 G: 2 INJECTION, SOLUTION INTRAVENOUS at 05:12

## 2020-12-08 RX ADMIN — PHENYLEPHRINE HYDROCHLORIDE 100 MCG: 10 INJECTION INTRAVENOUS at 10:12

## 2020-12-08 RX ADMIN — PHENOBARBITAL 100 MG: 20 ELIXIR ORAL at 11:12

## 2020-12-08 RX ADMIN — CEFTRIAXONE 2 G: 2 INJECTION, SOLUTION INTRAVENOUS at 09:12

## 2020-12-08 NOTE — PLAN OF CARE
Patient remained free from injury. PEG tube in place. NPO status. Colostomy bag producing brown stool. Iglesias cath in use.  Wound vac in place. Wound care performed IVF maintained. Q2 turn. Log rolls to elevate heels. Trach in use. Will continue to monitor.

## 2020-12-08 NOTE — ASSESSMENT & PLAN NOTE
S/p lap loop sigmoid colostomy with evidence of prolapse and edema with obstructive symptoms on presentation requiring rizvi decompression    - to OR today for colostomy revision, possible laparoscopic vs open assistance  - All risks, benefits and alternatives fully explained to patient's mom via phone consent. Risks include, but are not limited to, bleeding, infection, anastomotic leak, damage to ureter, damage to other intra-abdominal organs such as colon, rectum, small bowel, stomach, liver, bladder, reproductive organs, sexual dysfunction, urinary dysfunction, postoperative abscess, conversion to open operation, perioperative MI, CVA and death.  All questions field and appropriately answered to patient's mom's satisfaction via phone.  Consent signed and placed on chart.  - NPO for now, likely okay to resume postop  - rest of care per primary team

## 2020-12-08 NOTE — PROGRESS NOTES
Trach care done. Site cleaned and dried. Inner cannula changed. New gauze sponge placed. Suction, ambubag and extra trach at the bedside along with inner cannulas.

## 2020-12-08 NOTE — PROGRESS NOTES
[facility]  Colorectal Surgery  Progress Note    Subjective:     History of Present Illness:  26yo M with CP and extensive osteomyelitis with decubitus ulcer who is s/p lap loop sigmoid colostomy construction on 10/29/2020 for fecal diversion. Also s/p trach/PEG. Represented to hospital with colostomy prolapse. Iglesias placed into stoma for decompression which released large amount of gas/stool. Surgery consulted for evaluation    Post-Op Info:  Procedure(s) (LRB):  REVISION, COLOSTOMY (N/A)   Day of Surgery     Interval History: No acute events overnight. Still with good ostomy function.    Medications:  Continuous Infusions:   custom IV infusion builder 100 mL/hr at 12/07/20 0745     Scheduled Meds:   baclofen  10 mg Per G Tube TID    ceFEPime (MAXIPIME) IVPB  2 g Intravenous Q8H    cefTRIAXone (ROCEPHIN) IVPB  2 g Intravenous Once    gabapentin  250 mg Per G Tube QHS    linezolid  600 mg Intravenous Q12H    metronidazole  500 mg Intravenous Q8H    metronidazole  500 mg Intravenous Once    PHENobarbitaL  100 mg Per G Tube QHS    potassium chloride  10 mEq Intravenous Q1H    propranoloL  20 mg Per G Tube TID     PRN Meds:sodium chloride, lorazepam, melatonin     Review of patient's allergies indicates:   Allergen Reactions    Morphine sulfate Hives     Objective:     Vital Signs (Most Recent):  Temp: 98.3 °F (36.8 °C) (12/08/20 0742)  Pulse: 100 (12/08/20 0742)  Resp: 20 (12/08/20 0742)  BP: (!) 150/98 (12/08/20 0742)  SpO2: 100 % (12/08/20 0742) Vital Signs (24h Range):  Temp:  [97.3 °F (36.3 °C)-98.7 °F (37.1 °C)] 98.3 °F (36.8 °C)  Pulse:  [] 100  Resp:  [18-22] 20  SpO2:  [96 %-100 %] 100 %  BP: (127-198)/() 150/98     Weight: 32.2 kg (70 lb 15.8 oz)  Body mass index is 17.12 kg/m².    Intake/Output - Last 3 Shifts       12/06 0700 - 12/07 0659 12/07 0700 - 12/08 0659 12/08 0700 - 12/09 0659    P.O.  0     Total Intake(mL/kg)  0 (0)     Urine (mL/kg/hr) 1500 950 (1.2)      Other  0     Stool 1100 700     Total Output 2600 1650     Net -2600 -1650                  Physical Exam  Constitutional:       General: He is not in acute distress.     Appearance: He is well-developed. He is not ill-appearing, toxic-appearing or diaphoretic.   HENT:      Right Ear: External ear normal.      Left Ear: External ear normal.      Nose: Nose normal.   Eyes:      General: No scleral icterus.  Neck:      Musculoskeletal: Normal range of motion and neck supple.      Thyroid: No thyromegaly.   Cardiovascular:      Rate and Rhythm: Normal rate and regular rhythm.      Heart sounds: Normal heart sounds. No murmur.   Pulmonary:      Effort: Pulmonary effort is normal.      Breath sounds: Normal breath sounds.   Abdominal:      Comments: Soft, mild distention; slight ostomy prolapse and edematous with rizvi in proximal limb with liquid stool in bag   Musculoskeletal:      Comments: Extensive decubitus ulcer   Lymphadenopathy:      Cervical: No cervical adenopathy.   Skin:     General: Skin is warm and dry.   Neurological:      Comments: Nonverbal       Significant Labs:  CBC:   Recent Labs   Lab 12/08/20  0647   WBC 12.65   RBC 3.97*   HGB 11.1*   HCT 36.4*   *   MCV 92   MCH 28.0   MCHC 30.5*     BMP:   Recent Labs   Lab 12/08/20  0647   GLU 77      K 2.6*   *   CO2 20*   BUN 24*   CREATININE 0.6   CALCIUM 7.9*     Assessment/Plan:     * Status post colostomy  S/p lap loop sigmoid colostomy with evidence of prolapse and edema with obstructive symptoms on presentation requiring rizvi decompression    - to OR today for colostomy revision, possible laparoscopic vs open assistance  - All risks, benefits and alternatives fully explained to patient's mom via phone consent. Risks include, but are not limited to, bleeding, infection, anastomotic leak, damage to ureter, damage to other intra-abdominal organs such as colon, rectum, small bowel, stomach, liver, bladder, reproductive organs, sexual  dysfunction, urinary dysfunction, postoperative abscess, conversion to open operation, perioperative MI, CVA and death.  All questions field and appropriately answered to patient's mom's satisfaction via phone.  Consent signed and placed on chart.  - NPO for now, likely okay to resume postop  - rest of care per primary team    Decubitus ulcer of ischial area, left, stage IV  Care per primary team    UTI (urinary tract infection)  Care per primary team    Anemia, unspecified  Care per primary team    Seizure disorder  Care per primary team    Cerebral palsy  Care per primary team        Michael Ortez MD  Colorectal Surgery  [facility]

## 2020-12-08 NOTE — OP NOTE
[facility]  Surgery Department  Operative Note    SUMMARY     Date of Procedure: 12/8/2020     Procedure:  1. Colostomy revision  2. Partial sigmoid colectomy with end colostomy     Surgeon(s) and Role:     * Michael Ortez MD - Primary    Assisting Surgeon: Juan Vargas MD - Fellow/Resident    Pre-Operative Diagnosis: Colostomy prolapse [K94.09]    Post-Operative Diagnosis: Post-Op Diagnosis Codes:     * Colostomy prolapse [K94.09]    Anesthesia: General    Technical Procedures Used:   1. Colostomy revision  2. Partial sigmoid colectomy with end colostomy     Indications for Procedure:  25-year-old male with previous loop sigmoid colostomy for stool diversion to assist with fecal diversion to heal bilateral decubitus ulcers who has had colostomy prolapse who presents for definitive surgical management    Findings of the Procedure:  Prolapsed loop colostomy with large amount of redundancy of the sigmoid colon above the level of the ostomy with evidence of prolapse requiring takedown of the ostomy, sigmoid resection and conversion to end colostomy    Description of the Procedure:  Patient was brought to the operating room placed supine on table.  General anesthesia was then induced.  The abdomen was then prepped and draped usual sterile fashion.  A preoperative surgical time-out was performed confirming the right hip patient, procedure and preop medications given.  The ostomy site was then evaluated and found to be edematous with some evidence of prolapse.  The proximal limb was superior was easily identified with stool the distal limb identified with pasty like material in the lumen.  The mucocutaneous junction was then circumferentially incised and the colostomy was free from underlying attachments.  Of note, there was poor adhesive tissue of the ostomy to the subcutaneous and fascial levels indicating poor wound healing.  Once this was fully taken down, a large amount of proximal  sigmoid/descending colon was able to be brought through the wound indicating a large amount of redundancy will be prone to future prolapse.  Therefore this was pulled up as far as could be done without creating excess tension and a mesenteric window was made beneath the colon at this location and noncrushing bowel clamps were used to clamp the colon.  It was divided using a 10 blade.  An area on the distal side was then evaluated in an area that was just below the fascial level was brought up and a mesenteric window was made beneath this location and a TA 60 blue load was used to transect the sigmoid colon below level the fascia distally.  This staple line was then oversewn with a 2-0 Vicryl suture.  The mesentery between the cut ends of the colon was then taken with the EnSeal device.  The colon with the colostomy intact was then passed off the field for final pathology.  The mesentery was found to be completely hemostatic as was the intra-abdominal contents.  The fascial defect was slightly bigger than the colon and therefore 2 separate 0 Vicryl sutures using a figure-of-eight fashion were used to partially close the fascia defect of the ostomy site.  Once this was completed, the colon was brought up through the defect for and end colostomy construction.  The fascia was tacked circumferentially around to the undersurface of the colon with seromuscular bites using 0 Vicryl suture to assist with future prevention of hernia as well as prolapse.  Once this was completed, a standard Lucy end descending colostomy was constructed with 4 separate 3-0 Vicryl sutures used for Brooking sutures in usual fashion.  Once this was completed, additional 3-0 Vicryl sutures were used to reapproximate the mucocutaneous junction circumferentially.  Once this was completed, a stoma appliance was applied.  The patient was then awoken from general endotracheal anesthesia and taken to the postanesthesia care in stable condition.  He  tolerated procedure well.  All sponge, needle and instrument counts were correct at the end of the case.    Significant Surgical Tasks Conducted by the Assistant(s), if Applicable: Assisted with the operation as a first assistant    Complications: No    Estimated Blood Loss (EBL): 50 mL           Implants: * No implants in log *    Specimens:   Specimen (12h ago, onward)    None                  Condition: Good    Disposition: PACU - hemodynamically stable.    Attestation: I performed the procedure.

## 2020-12-08 NOTE — HOSPITAL COURSE
Mr Moscoso is a 25 year old male who presented for [facility] for evaluation of colostomy. He at [facility] from IV antibiotic. He was noted to have HGB of 6.9 and was transfused one unit of PRBC. Currently on Bicar drip for metabolic acidosis. General Surgery consulted, plan colostomy revision. Potassium 2.8 this AM, replenished. As of 12/8/2020, patient status post revision by Dr Ortez today. Potassium 2.6 this AM, replenish, check Mag and Phos, he was continued on NPO x meds.  As of 12/9/2020, s/p colostomy revision on yesterday. Start tube feeding with water q 6 hour water slush, make sure tolerates tube feeding, plan discharge back to [facility]. As of 12 /10, vital signs and labs stable. Patient tolerating tube feeding, brown liquid BM noted in colostomy bag. Potassium 3.2, replenish. Patient was seen, examined and deemed suitable for discharge.

## 2020-12-08 NOTE — PROGRESS NOTES
[facility] Hospital Medicine  Progress Note    Patient Name: Glen Moscoso  MRN: [id number]  Patient Class: OP- Observation   Admission Date: 2020  Length of Stay: 0 days  Attending Physician: Travis Bobo MD  Primary Care Provider: Yadi Lawson MD        Subjective:     Principal Problem:Status post colostomy        HPI:  Patient is a 24 y/o  male with a PMH of seizures, colitis, acid reflux, severe PCM, Peg tube, pelvic osteo, cerebral palsy, anemia, proctocolitis to ED from [facility] due to malfunctioning colostomy. Patient was at [facility] completing abx course. He was discharged on . Per chart review patient grew VRE on last blood cultures. Unable to obtain history as patient is non-verbal without any family members at bedside. Patient presented for worsening abdominal distention and concern for colostomy malfunction. Surgery was consulted in ED and rizvi catheter was placed in sigmoid colostomy for decompression of the bowel. Lab work on arrival concerning for leukocytosis, hb.9, along with metabolic acidosis. Patient was bolused 30 cc/kg of LR and started on zyvox, cefepime, and flagyl.     Overview/Hospital Course:  Mr Moscoso is a 25 year old male who presented for [facility] for evaluation of colostomy. He at [facility] from IV antibiotic. He was noted to have HGB of 6.9 and was transfused one unit of PRBC. Currently on Bicar drip for metabolic acidosis. General Surgery consulted, plan Colostomy revision tomorrow. Potassium 2.8 this AM, replenished.         Interval History: Plan coloscopy revision tomorrow.    Review of Systems   Unable to perform ROS: Patient nonverbal     Objective:     Vital Signs (Most Recent):  Temp: 98.7 °F (37.1 °C) (20)  Pulse: 92 (20)  Resp: 20 (20)  BP: (!) 198/108 (20)  SpO2: 100 % (20) Vital Signs (24h Range):  Temp:  [97.7 °F (36.5 °C)-98.7 °F (37.1 °C)] 98.7 °F (37.1  °C)  Pulse:  [] 92  Resp:  [14-22] 20  SpO2:  [96 %-100 %] 100 %  BP: (113-198)/() 198/108     Weight: 32.2 kg (70 lb 15.8 oz)  Body mass index is 17.12 kg/m².    Intake/Output Summary (Last 24 hours) at 12/7/2020 1828  Last data filed at 12/7/2020 1700  Gross per 24 hour   Intake 0 ml   Output 2800 ml   Net -2800 ml      Physical Exam  Vitals signs and nursing note reviewed.   Constitutional:       General: He is not in acute distress.     Appearance: He is cachectic. He is ill-appearing. He is not toxic-appearing or diaphoretic.   HENT:      Head: Normocephalic and atraumatic.      Nose: Nose normal.   Eyes:      General:         Right eye: No discharge.         Left eye: No discharge.   Neck:      Musculoskeletal: No neck rigidity or muscular tenderness.      Vascular: No carotid bruit.   Cardiovascular:      Heart sounds: No murmur. No friction rub. No gallop.    Pulmonary:      Effort: No respiratory distress.      Breath sounds: No stridor. No wheezing, rhonchi or rales.   Chest:      Chest wall: No tenderness.   Abdominal:      General: There is distension.      Palpations: There is no mass.      Tenderness: There is no abdominal tenderness. There is no right CVA tenderness, left CVA tenderness, guarding or rebound.      Hernia: No hernia is present.      Comments: Colonoscopy with large edematous pink stoma   Musculoskeletal:         General: No swelling, tenderness, deformity or signs of injury.      Right lower leg: No edema.      Left lower leg: No edema.      Comments: Small contracted legs    Lymphadenopathy:      Cervical: No cervical adenopathy.   Skin:     Capillary Refill: Capillary refill takes less than 2 seconds.      Coloration: Skin is not jaundiced or pale.      Findings: No bruising, erythema, lesion or rash.      Comments: Bilateral sacral wounds, stage 4   Neurological:      General: No focal deficit present.         Significant Labs:   CBC:   Recent Labs   Lab 12/06/20  1135  12/07/20  0817   WBC 15.59* 13.31*   HGB 6.9* 10.9*   HCT 23.6* 36.2*   * 571*     CMP:   Recent Labs   Lab 12/06/20  1135 12/06/20  1920 12/07/20  0051 12/07/20  0817    139 138 139   K 4.9 3.6 2.5* 2.8*   * 116* 116* 117*   CO2 13* 13* 11* 16*   GLU 97 79 107 82   BUN 42* 39* 35* 33*   CREATININE 0.6 0.6 0.6 0.6   CALCIUM 8.1* 8.3* 8.5* 8.2*   PROT 6.2  --   --   --    ALBUMIN 1.5*  --   --   --    BILITOT 0.1  --   --   --    ALKPHOS 98  --   --   --    AST 15  --   --   --    ALT 6*  --   --   --    ANIONGAP 6* 10 11 6*   EGFRNONAA >60 >60 >60 >60       Significant Imaging:     Imaging Results          X-Ray Chest AP Portable (Final result)  Result time 12/06/20 13:13:24    Final result by Martin Ha MD (12/06/20 13:13:24)                 Impression:      No acute infiltrate or consolidation.  Gaseous distention of bowel loops.      Electronically signed by: Martin Ha MD  Date:    12/06/2020  Time:    13:13             Narrative:    EXAMINATION:  XR CHEST AP PORTABLE    CLINICAL HISTORY:  cough;    TECHNIQUE:  Single frontal view of the chest was performed.    COMPARISON:  10/31/2020    FINDINGS:  Tracheostomy tube.  Central line present with the tip overlying the superior vena cava.  Lung fields are clear.    The cardiac silhouette is normal in size. Gaseous distention of bowel loops similar to the prior study.                                Assessment/Plan:      * Status post colostomy  12/6:  Colostomy malfunction  Iglesias cath in place for decompression   Will keep NPO   Surgery consulted on case     12/7  General surgery following   Plan colostomy revision tomorrow, 12/8        UTI (urinary tract infection)  12/6:  U/a and urine culture   Cont cefepime       Hypokalemia  K level 2.5 on admission   Monitor and replenish and needed       Sepsis  12/6:  Sepsis likely secondary to UTI  However will cover for intra-abdominal source  Empirically as well  Zyvox on board due to VRE bacteremia  from previous culture  Will plan to de-escalate based on blood cultures   Continue zyvox, cefepime and flagyl  Blood cultures urine cultures pending  Lactic acid WNL  VSS     12/7  BC X 2, NGTD  Urine culture in progress   Continue IV antibiotics       Decubitus ulcer of ischial area, left, stage IV  12/6:  Will consult wound care     12/7  Wound care following       Anemia, unspecified  12/6:  hgb 6.9 on arrival  No signs of bleeding noted  Will check iron studies, folate, b12, retic  Transfuse 1 unit prbc  Cont to monitor h/h  No lovenox/heparin due to anemia    12/7  HGB stable after 1 unit of PRBC         Seizure disorder  12/6:  On phenobarbital 100mg qhs   through peg tube  Will discuss with pharmacy on IV alternatives  Ativan PRN  Seizure precautions      Cerebral palsy  12/6:  Patient nonverbal at baseline  Cont to monitor         VTE Risk Mitigation (From admission, onward)    None          Discharge Planning   ROCÍO:      Code Status: Prior   Is the patient medically ready for discharge?:     Reason for patient still in hospital (select all that apply): Patient trending condition and Treatment                     Lanre Cohn NP  Department of Hospital Medicine   [facility]

## 2020-12-08 NOTE — ANESTHESIA PREPROCEDURE EVALUATION
12/08/2020  Glen Moscoso is a 25 y.o., male.    Anesthesia Evaluation    I have reviewed the Patient Summary Reports.    I have reviewed the Nursing Notes. I have reviewed the NPO Status.   I have reviewed the Medications.     Review of Systems  Anesthesia Hx:  No problems with previous Anesthesia Denies Hx of Anesthetic complications  Denies Family Hx of Anesthesia complications.   Denies Personal Hx of Anesthesia complications.   Social:  Non-Smoker, No Alcohol Use    Cardiovascular:  Cardiovascular Normal  ECG has been reviewed.    Pulmonary:   Pneumonia    Renal/:  Renal/ Normal     Hepatic/GI:   GERD    Neurological:   Neuromuscular Disease, Seizures    Endocrine:  Endocrine Normal    Psych:  Psychiatric Normal         Patient Active Problem List   Diagnosis    Cerebral palsy    Sinus tachycardia    Proctocolitis    Seizure disorder    Bacterial infection due to Serratia    Anemia, unspecified    Hypernatremia    Decubitus ulcer of ischial area, left, stage IV    Decubitus ulcers    Sepsis    Pneumoperitoneum    Moderate protein-calorie malnutrition    Colitis    Osteomyelitis of pelvis, presumed     Right lower lobe pneumonia    Status post tracheostomy    Osteomyelitis, pelvis    Status post colostomy    Hypokalemia    Functional quadriplegia    Tachycardia    UTI (urinary tract infection)    Severe malnutrition     No current facility-administered medications on file prior to encounter.      Current Outpatient Medications on File Prior to Encounter   Medication Sig Dispense Refill    acetaminophen (TYLENOL) 325 MG tablet Take 325 mg by mouth every 4 (four) hours as needed for Pain.      arginine-glutamine-calcium HMB (CHEO) 7-7-1.5 gram PwPk Take by mouth 2 (two) times a day.      ascorbic acid, vitamin C, (VITAMIN C) 500 MG tablet 1 tablet (500 mg total) by Per G Tube  route once daily.      baclofen (LIORESAL) 10 MG tablet 1 tablet (10 mg total) by Per G Tube route 3 (three) times daily. 90 tablet 11    bisacodyL (DULCOLAX) 10 mg Supp Place 1 suppository (10 mg total) rectally daily as needed (Until bowel movement if patient has no bowel movement for 2 days).  0    diphenhydrAMINE (BENADRYL) 12.5 mg/5 mL elixir 10 mLs (25 mg total) by Per G Tube route 4 (four) times daily as needed for Allergies.  0    gabapentin (NEURONTIN) 250 mg/5 mL solution 5 mLs (250 mg total) by Per G Tube route nightly. At bedtime      lactobacillus acidophilus & bulgar (LACTINEX) 100 million cell packet Take 1 tablet by mouth once daily.      melatonin (MELATIN) 3 mg tablet 2 tablets (6 mg total) by Per G Tube route nightly as needed for Insomnia.  0    miconazole nitrate 2% (MICOTIN) 2 % Oint Apply topically 2 (two) times daily. for 14 days  0    multivitamin liquid no.118 Liqd 10 mLs by Per G Tube route once daily.      PHENobarbitaL 20 mg/5 mL (4 mg/mL) Elix elixir 25 mLs (100 mg total) by Per G Tube route every evening. 473 mL 0    potassium chloride (KLOR-CON) 20 mEq Pack DISSOLVE 2 TABLETS AS DIRECTED AND DRINK DAILY      propranoloL (INDERAL) 20 MG tablet 1 tablet (20 mg total) by Per G Tube route 3 (three) times daily. (Patient taking differently: 20 mg by Per G Tube route 4 (four) times daily. ) 90 tablet 11     Past Surgical History:   Procedure Laterality Date    CHOLECYSTECTOMY      FLEXIBLE SIGMOIDOSCOPY N/A 9/9/2020    Procedure: SIGMOIDOSCOPY, FLEXIBLE;  Surgeon: America Goode MD;  Location: [address]);  Service: Endoscopy;  Laterality: N/A;    HIP SURGERY      TRACHEOSTOMY N/A 10/27/2020    Procedure: CREATION, TRACHEOSTOMY;  Surgeon: Kar Palma MD;  Location: [facility] OR;  Service: ENT;  Laterality: N/A;         Physical Exam  General:  Malnutrition    Airway/Jaw/Neck:  Airway Findings: Mouth Opening: Normal Tongue: Normal  General Airway Assessment: Adult   Mallampati: II  TM Distance: 4 - 6 cm  Jaw/Neck Findings:  Neck ROM: Normal ROM      Dental:  Dental Findings: In tact   Chest/Lungs:  Chest/Lungs Findings: Clear to auscultation, Normal Respiratory Rate     Heart/Vascular:  Heart Findings: Rate: Normal  Rhythm: Regular Rhythm  Sounds: Normal        Mental Status:  Mental Status Findings:  Cooperative, Alert and Oriented       Lab Results   Component Value Date    WBC 13.31 (H) 12/07/2020    HGB 10.9 (L) 12/07/2020    HCT 36.2 (L) 12/07/2020    MCV 93 12/07/2020     (H) 12/07/2020         Chemistry        Component Value Date/Time     12/08/2020 0002    K 2.4 (LL) 12/08/2020 0002     (H) 12/08/2020 0002    CO2 16 (L) 12/08/2020 0002    BUN 25 (H) 12/08/2020 0002    CREATININE 0.6 12/08/2020 0002    GLU 82 12/08/2020 0002        Component Value Date/Time    CALCIUM 8.0 (L) 12/08/2020 0002    ALKPHOS 98 12/06/2020 1135    AST 15 12/06/2020 1135    ALT 6 (L) 12/06/2020 1135    BILITOT 0.1 12/06/2020 1135    ESTGFRAFRICA >60 12/08/2020 0002    EGFRNONAA >60 12/08/2020 0002            Anesthesia Plan  Type of Anesthesia, risks & benefits discussed:  Anesthesia Type:  general  Patient's Preference:   Intra-op Monitoring Plan: standard ASA monitors  Intra-op Monitoring Plan Comments:   Post Op Pain Control Plan: per primary service following discharge from PACU  Post Op Pain Control Plan Comments:   Induction:   IV  Beta Blocker:  Patient is not currently on a Beta-Blocker (No further documentation required).       Informed Consent: Patient understands risks and agrees with Anesthesia plan.  Questions answered. Anesthesia consent signed with patient.  ASA Score: 3     Day of Surgery Review of History & Physical: I have interviewed and examined the patient. I have reviewed the patient's H&P dated:  There are no significant changes.  H&P update referred to the surgeon.         Ready For Surgery From Anesthesia Perspective.

## 2020-12-08 NOTE — PLAN OF CARE
12/08/20 1554   Post-Acute Status   Post-Acute Authorization Placement   Post-Acute Placement Status Referrals Sent   Discharge Plan   Discharge Plan A Rehab   Discharge Plan B Rehab       Nahum White LMSW 12/8/2020 3:55 PM

## 2020-12-08 NOTE — PLAN OF CARE
FELISHA spoke with pt's mother via phone to complete discharge assessment. Pt will return to [facility] when discharged. The facility will transport when he is discharged. Pt uses a wheelchair. No needs identified at this time. FELISHA explained transitional care folder, information on advanced directives, information on pharmacy bedside delivery, and discharge planning begins on admission with contact information for any needs/questions.     Payor: MEDICAID / Plan: MEDICAID OF [place] / Product Type: Government /   PCP: Yadi Lawson MD  Pharmacy:   \[facility]\"" PHARMACY - [place], [place] - [address]  [address]  [place] [address]  Phone: [phone] Fax: [phone]  Bedside Delivery: No  MyChart: Active       12/08/20 1300   Discharge Assessment   Assessment Type Discharge Planning Assessment   Confirmed/corrected address and phone number on facesheet? Yes   Assessment information obtained from? Caregiver   Expected Length of Stay (days)   (TBD)   Communicated expected length of stay with patient/caregiver yes   Prior to hospitilization cognitive status: Alert/Oriented   Prior to hospitalization functional status: Independent   Current cognitive status: Alert/Oriented   Current Functional Status: Independent   Facility Arrived From: [facility]   Lives With parent(s)   Able to Return to Prior Arrangements yes   Is patient able to care for self after discharge? Yes   Who are your caregiver(s) and their phone number(s)? Leni Moscoso (mother) [phone]   Patient's perception of discharge disposition skilled nursing facility   Readmission Within the Last 30 Days current reason for admission unrelated to previous admission   If yes, most recent facility name: [facility]   Patient currently being followed by outpatient case management? No   Patient currently receives any other outside agency services? No   Equipment Currently Used at Home none   Part D Coverage n/a   Do you have any problems affording any of  your prescribed medications? No   Is the patient taking medications as prescribed? yes   Does the patient have transportation home? Yes   Transportation Anticipated agency   Dialysis Name and Scheduled days n/a   Does the patient receive services at the [facility]? No   Discharge Plan A Skilled Nursing Facility   Discharge Plan B Skilled Nursing Facility   DME Needed Upon Discharge  none   Patient/Family in Agreement with Plan yes   Readmission Questionnaire   At the time of your discharge, did someone talk to you about what your health problems were? Yes   At the time of discharge, did someone talk to you about what to watch out for regarding worsening of your health problem? Yes   At the time of discharge, did someone talk to you about what to do if you experienced worsening of your health problem? Yes   At the time of discharge, did someone talk to you about which medication to take when you left the hospital and which ones to stop taking? Yes   At the time of discharge, did someone talk to you about when and where to follow up with a doctor after you left the hospital? Yes   How often do you need to have someone help you when you read instructions, pamphlets, or other written material from your doctor or pharmacy? Often   Do you have problems taking your medications as prescribed? No   Do you have any problems affording any of  your prescribed medications? No   Do you have problems obtaining/receiving your medications? No   Does the patient have transportation to healthcare appointments? Yes   Living Arrangements house   Does the patient have family/friends to help with healtcare needs after discharge? yes   Does your caregiver provide all the help you need? Yes   Are you currently feeling confused? No   Are you currently having problems thinking? No   Are you currently having memory problems? No   Have you felt down, depressed, or hopeless? 0   Have you felt little interest or pleasure in doing things? 0    In the last 7 days, my sleep quality was: good       Nahum White LMSW 12/8/2020 3:50 PM

## 2020-12-08 NOTE — ASSESSMENT & PLAN NOTE
12/6:  Sepsis likely secondary to UTI  However will cover for intra-abdominal source  Empirically as well  Zyvox on board due to VRE bacteremia from previous culture  Will plan to de-escalate based on blood cultures   Continue zyvox, cefepime and flagyl  Blood cultures urine cultures pending  Lactic acid WNL  VSS     12/7  BC X 2, NGTD  Urine culture in progress   Continue IV antibiotics

## 2020-12-08 NOTE — ASSESSMENT & PLAN NOTE
12/6:  Colostomy malfunction  Iglesias cath in place for decompression   Will keep NPO   Surgery consulted on case     12/7  General surgery following   Plan colostomy revision tomorrow, 12/8

## 2020-12-08 NOTE — SUBJECTIVE & OBJECTIVE
Interval History: Plan coloscopy revision tomorrow.    Review of Systems   Unable to perform ROS: Patient nonverbal     Objective:     Vital Signs (Most Recent):  Temp: 98.7 °F (37.1 °C) (12/07/20 1615)  Pulse: 92 (12/07/20 1615)  Resp: 20 (12/07/20 1615)  BP: (!) 198/108 (12/07/20 1615)  SpO2: 100 % (12/07/20 1615) Vital Signs (24h Range):  Temp:  [97.7 °F (36.5 °C)-98.7 °F (37.1 °C)] 98.7 °F (37.1 °C)  Pulse:  [] 92  Resp:  [14-22] 20  SpO2:  [96 %-100 %] 100 %  BP: (113-198)/() 198/108     Weight: 32.2 kg (70 lb 15.8 oz)  Body mass index is 17.12 kg/m².    Intake/Output Summary (Last 24 hours) at 12/7/2020 1828  Last data filed at 12/7/2020 1700  Gross per 24 hour   Intake 0 ml   Output 2800 ml   Net -2800 ml      Physical Exam  Vitals signs and nursing note reviewed.   Constitutional:       General: He is not in acute distress.     Appearance: He is cachectic. He is ill-appearing. He is not toxic-appearing or diaphoretic.   HENT:      Head: Normocephalic and atraumatic.      Nose: Nose normal.   Eyes:      General:         Right eye: No discharge.         Left eye: No discharge.   Neck:      Musculoskeletal: No neck rigidity or muscular tenderness.      Vascular: No carotid bruit.   Cardiovascular:      Heart sounds: No murmur. No friction rub. No gallop.    Pulmonary:      Effort: No respiratory distress.      Breath sounds: No stridor. No wheezing, rhonchi or rales.   Chest:      Chest wall: No tenderness.   Abdominal:      General: There is distension.      Palpations: There is no mass.      Tenderness: There is no abdominal tenderness. There is no right CVA tenderness, left CVA tenderness, guarding or rebound.      Hernia: No hernia is present.      Comments: Colonoscopy with large edematous pink stoma   Musculoskeletal:         General: No swelling, tenderness, deformity or signs of injury.      Right lower leg: No edema.      Left lower leg: No edema.      Comments: Small contracted legs     Lymphadenopathy:      Cervical: No cervical adenopathy.   Skin:     Capillary Refill: Capillary refill takes less than 2 seconds.      Coloration: Skin is not jaundiced or pale.      Findings: No bruising, erythema, lesion or rash.      Comments: Bilateral sacral wounds, stage 4   Neurological:      General: No focal deficit present.         Significant Labs:   CBC:   Recent Labs   Lab 12/06/20  1135 12/07/20  0817   WBC 15.59* 13.31*   HGB 6.9* 10.9*   HCT 23.6* 36.2*   * 571*     CMP:   Recent Labs   Lab 12/06/20  1135 12/06/20  1920 12/07/20  0051 12/07/20  0817    139 138 139   K 4.9 3.6 2.5* 2.8*   * 116* 116* 117*   CO2 13* 13* 11* 16*   GLU 97 79 107 82   BUN 42* 39* 35* 33*   CREATININE 0.6 0.6 0.6 0.6   CALCIUM 8.1* 8.3* 8.5* 8.2*   PROT 6.2  --   --   --    ALBUMIN 1.5*  --   --   --    BILITOT 0.1  --   --   --    ALKPHOS 98  --   --   --    AST 15  --   --   --    ALT 6*  --   --   --    ANIONGAP 6* 10 11 6*   EGFRNONAA >60 >60 >60 >60       Significant Imaging:     Imaging Results          X-Ray Chest AP Portable (Final result)  Result time 12/06/20 13:13:24    Final result by Martin Ha MD (12/06/20 13:13:24)                 Impression:      No acute infiltrate or consolidation.  Gaseous distention of bowel loops.      Electronically signed by: Martin Ha MD  Date:    12/06/2020  Time:    13:13             Narrative:    EXAMINATION:  XR CHEST AP PORTABLE    CLINICAL HISTORY:  cough;    TECHNIQUE:  Single frontal view of the chest was performed.    COMPARISON:  10/31/2020    FINDINGS:  Tracheostomy tube.  Central line present with the tip overlying the superior vena cava.  Lung fields are clear.    The cardiac silhouette is normal in size. Gaseous distention of bowel loops similar to the prior study.

## 2020-12-08 NOTE — SUBJECTIVE & OBJECTIVE
Interval History: No acute events overnight. Still with good ostomy function.    Medications:  Continuous Infusions:   custom IV infusion builder 100 mL/hr at 12/07/20 0745     Scheduled Meds:   baclofen  10 mg Per G Tube TID    ceFEPime (MAXIPIME) IVPB  2 g Intravenous Q8H    cefTRIAXone (ROCEPHIN) IVPB  2 g Intravenous Once    gabapentin  250 mg Per G Tube QHS    linezolid  600 mg Intravenous Q12H    metronidazole  500 mg Intravenous Q8H    metronidazole  500 mg Intravenous Once    PHENobarbitaL  100 mg Per G Tube QHS    potassium chloride  10 mEq Intravenous Q1H    propranoloL  20 mg Per G Tube TID     PRN Meds:sodium chloride, lorazepam, melatonin     Review of patient's allergies indicates:   Allergen Reactions    Morphine sulfate Hives     Objective:     Vital Signs (Most Recent):  Temp: 98.3 °F (36.8 °C) (12/08/20 0742)  Pulse: 100 (12/08/20 0742)  Resp: 20 (12/08/20 0742)  BP: (!) 150/98 (12/08/20 0742)  SpO2: 100 % (12/08/20 0742) Vital Signs (24h Range):  Temp:  [97.3 °F (36.3 °C)-98.7 °F (37.1 °C)] 98.3 °F (36.8 °C)  Pulse:  [] 100  Resp:  [18-22] 20  SpO2:  [96 %-100 %] 100 %  BP: (127-198)/() 150/98     Weight: 32.2 kg (70 lb 15.8 oz)  Body mass index is 17.12 kg/m².    Intake/Output - Last 3 Shifts       12/06 0700 - 12/07 0659 12/07 0700 - 12/08 0659 12/08 0700 - 12/09 0659    P.O.  0     Total Intake(mL/kg)  0 (0)     Urine (mL/kg/hr) 1500 950 (1.2)     Other  0     Stool 1100 700     Total Output 2600 1650     Net -2600 -1650                  Physical Exam  Constitutional:       General: He is not in acute distress.     Appearance: He is well-developed. He is not ill-appearing, toxic-appearing or diaphoretic.   HENT:      Right Ear: External ear normal.      Left Ear: External ear normal.      Nose: Nose normal.   Eyes:      General: No scleral icterus.  Neck:      Musculoskeletal: Normal range of motion and neck supple.      Thyroid: No thyromegaly.   Cardiovascular:       Rate and Rhythm: Normal rate and regular rhythm.      Heart sounds: Normal heart sounds. No murmur.   Pulmonary:      Effort: Pulmonary effort is normal.      Breath sounds: Normal breath sounds.   Abdominal:      Comments: Soft, mild distention; slight ostomy prolapse and edematous with rizvi in proximal limb with liquid stool in bag   Musculoskeletal:      Comments: Extensive decubitus ulcer   Lymphadenopathy:      Cervical: No cervical adenopathy.   Skin:     General: Skin is warm and dry.   Neurological:      Comments: Nonverbal       Significant Labs:  CBC:   Recent Labs   Lab 12/08/20  0647   WBC 12.65   RBC 3.97*   HGB 11.1*   HCT 36.4*   *   MCV 92   MCH 28.0   MCHC 30.5*     BMP:   Recent Labs   Lab 12/08/20  0647   GLU 77      K 2.6*   *   CO2 20*   BUN 24*   CREATININE 0.6   CALCIUM 7.9*

## 2020-12-08 NOTE — ANESTHESIA POSTPROCEDURE EVALUATION
Anesthesia Post Evaluation    Patient: Glen Moscoso    Procedure(s) Performed: Procedure(s) (LRB):  REVISION, COLOSTOMY (N/A)    Final Anesthesia Type: general    Patient location during evaluation: PACU  Patient participation: Yes- Able to Participate  Level of consciousness: awake and alert  Post-procedure vital signs: reviewed and stable  Pain management: adequate  Airway patency: patent  ZACKERY mitigation strategies: Verification of full reversal of neuromuscular block  PONV status at discharge: No PONV  Anesthetic complications: no      Cardiovascular status: hemodynamically stable  Respiratory status: spontaneous ventilation  Hydration status: euvolemic  Follow-up not needed.          Vitals Value Taken Time   /97 12/08/20 1136   Temp 36.3 °C (97.3 °F) 12/08/20 1100   Pulse 75 12/08/20 1139   Resp 17 12/08/20 1139   SpO2 100 % 12/08/20 1139   Vitals shown include unvalidated device data.      Event Time   Out of Recovery 12/08/2020 11:39:09         Pain/Lulú Score: Lulú Score: 5 (12/8/2020 11:30 AM)

## 2020-12-08 NOTE — TRANSFER OF CARE
"Anesthesia Transfer of Care Note    Patient: Glen Moscoso    Procedure(s) Performed: Procedure(s) (LRB):  REVISION, COLOSTOMY (N/A)    Patient location: PACU    Anesthesia Type: general    Transport from OR: Transported from OR on 6-10 L/min O2 by face mask with adequate spontaneous ventilation    Post pain: adequate analgesia    Post assessment: no apparent anesthetic complications and tolerated procedure well    Post vital signs: stable    Level of consciousness: sedated and responds to stimulation    Nausea/Vomiting: no nausea/vomiting    Complications: none    Transfer of care protocol was followed      Last vitals:   Visit Vitals  BP (!) 150/98   Pulse 100   Temp 36.8 °C (98.3 °F)   Resp 20   Ht 4' 6" (1.372 m)   Wt 32.2 kg (70 lb 15.8 oz)   SpO2 100%   BMI 17.12 kg/m²     "

## 2020-12-08 NOTE — PLAN OF CARE
Pt AAO x0.  VSS.  Pt remained afebrile throughout this shift.   IV ABX and K administered per order.   Pt remained free of falls this shift.   Plan of care reviewed. Patient verbalizes understanding.   Pt moving/turing q 2 hours. Frequent weight shifting encouraged.  Patient ST/SR on monitor.   Bed low, side rails up x 2, wheels locked, call light in reach.   Bed alarm maintained for safety.   Patient instructed to call for assistance.   Hourly rounding completed.   24 hour chart check completed.  Will continue to monitor.

## 2020-12-08 NOTE — ASSESSMENT & PLAN NOTE
12/6:  hgb 6.9 on arrival  No signs of bleeding noted  Will check iron studies, folate, b12, retic  Transfuse 1 unit prbc  Cont to monitor h/h  No lovenox/heparin due to anemia    12/7  HGB stable after 1 unit of PRBC

## 2020-12-09 LAB
ANION GAP SERPL CALC-SCNC: 11 MMOL/L (ref 8–16)
ANION GAP SERPL CALC-SCNC: 11 MMOL/L (ref 8–16)
ANION GAP SERPL CALC-SCNC: 16 MMOL/L (ref 8–16)
ANISOCYTOSIS BLD QL SMEAR: SLIGHT
BASOPHILS # BLD AUTO: 0.02 K/UL (ref 0–0.2)
BASOPHILS NFR BLD: 0.2 % (ref 0–1.9)
BUN SERPL-MCNC: 19 MG/DL (ref 6–20)
BUN SERPL-MCNC: 22 MG/DL (ref 6–20)
BUN SERPL-MCNC: 24 MG/DL (ref 6–20)
CALCIUM SERPL-MCNC: 7.4 MG/DL (ref 8.7–10.5)
CALCIUM SERPL-MCNC: 7.5 MG/DL (ref 8.7–10.5)
CALCIUM SERPL-MCNC: 7.7 MG/DL (ref 8.7–10.5)
CHLORIDE SERPL-SCNC: 102 MMOL/L (ref 95–110)
CHLORIDE SERPL-SCNC: 105 MMOL/L (ref 95–110)
CHLORIDE SERPL-SCNC: 105 MMOL/L (ref 95–110)
CO2 SERPL-SCNC: 19 MMOL/L (ref 23–29)
CO2 SERPL-SCNC: 21 MMOL/L (ref 23–29)
CO2 SERPL-SCNC: 25 MMOL/L (ref 23–29)
CREAT SERPL-MCNC: 0.6 MG/DL (ref 0.5–1.4)
CREAT SERPL-MCNC: 0.6 MG/DL (ref 0.5–1.4)
CREAT SERPL-MCNC: 0.7 MG/DL (ref 0.5–1.4)
DIFFERENTIAL METHOD: ABNORMAL
EOSINOPHIL # BLD AUTO: 0.2 K/UL (ref 0–0.5)
EOSINOPHIL NFR BLD: 1.3 % (ref 0–8)
ERYTHROCYTE [DISTWIDTH] IN BLOOD BY AUTOMATED COUNT: 19.2 % (ref 11.5–14.5)
EST. GFR  (AFRICAN AMERICAN): >60 ML/MIN/1.73 M^2
EST. GFR  (NON AFRICAN AMERICAN): >60 ML/MIN/1.73 M^2
GLUCOSE SERPL-MCNC: 136 MG/DL (ref 70–110)
GLUCOSE SERPL-MCNC: 77 MG/DL (ref 70–110)
GLUCOSE SERPL-MCNC: 91 MG/DL (ref 70–110)
HCT VFR BLD AUTO: 31.6 % (ref 40–54)
HGB BLD-MCNC: 9.4 G/DL (ref 14–18)
IMM GRANULOCYTES # BLD AUTO: 0.08 K/UL (ref 0–0.04)
IMM GRANULOCYTES NFR BLD AUTO: 0.6 % (ref 0–0.5)
LYMPHOCYTES # BLD AUTO: 1.6 K/UL (ref 1–4.8)
LYMPHOCYTES NFR BLD: 12.8 % (ref 18–48)
MAGNESIUM SERPL-MCNC: 2 MG/DL (ref 1.6–2.6)
MCH RBC QN AUTO: 27.9 PG (ref 27–31)
MCHC RBC AUTO-ENTMCNC: 29.7 G/DL (ref 32–36)
MCV RBC AUTO: 94 FL (ref 82–98)
MONOCYTES # BLD AUTO: 2.4 K/UL (ref 0.3–1)
MONOCYTES NFR BLD: 18.5 % (ref 4–15)
NEUTROPHILS # BLD AUTO: 8.5 K/UL (ref 1.8–7.7)
NEUTROPHILS NFR BLD: 66.6 % (ref 38–73)
NRBC BLD-RTO: 0 /100 WBC
OVALOCYTES BLD QL SMEAR: ABNORMAL
PHOSPHATE SERPL-MCNC: 2.6 MG/DL (ref 2.7–4.5)
PLATELET # BLD AUTO: 532 K/UL (ref 150–350)
PLATELET BLD QL SMEAR: ABNORMAL
PMV BLD AUTO: 9.3 FL (ref 9.2–12.9)
POIKILOCYTOSIS BLD QL SMEAR: SLIGHT
POTASSIUM SERPL-SCNC: 2.8 MMOL/L (ref 3.5–5.1)
POTASSIUM SERPL-SCNC: 3.5 MMOL/L (ref 3.5–5.1)
POTASSIUM SERPL-SCNC: 3.6 MMOL/L (ref 3.5–5.1)
RBC # BLD AUTO: 3.37 M/UL (ref 4.6–6.2)
SODIUM SERPL-SCNC: 137 MMOL/L (ref 136–145)
SODIUM SERPL-SCNC: 138 MMOL/L (ref 136–145)
SODIUM SERPL-SCNC: 140 MMOL/L (ref 136–145)
WBC # BLD AUTO: 12.72 K/UL (ref 3.9–12.7)

## 2020-12-09 PROCEDURE — 80048 BASIC METABOLIC PNL TOTAL CA: CPT

## 2020-12-09 PROCEDURE — 25000003 PHARM REV CODE 250: Performed by: COLON & RECTAL SURGERY

## 2020-12-09 PROCEDURE — 99900026 HC AIRWAY MAINTENANCE (STAT)

## 2020-12-09 PROCEDURE — 83735 ASSAY OF MAGNESIUM: CPT

## 2020-12-09 PROCEDURE — 63600175 PHARM REV CODE 636 W HCPCS: Performed by: COLON & RECTAL SURGERY

## 2020-12-09 PROCEDURE — S0030 INJECTION, METRONIDAZOLE: HCPCS | Performed by: COLON & RECTAL SURGERY

## 2020-12-09 PROCEDURE — 97803 MED NUTRITION INDIV SUBSEQ: CPT

## 2020-12-09 PROCEDURE — 99900035 HC TECH TIME PER 15 MIN (STAT)

## 2020-12-09 PROCEDURE — 99024 PR POST-OP FOLLOW-UP VISIT: ICD-10-PCS | Mod: ,,, | Performed by: COLON & RECTAL SURGERY

## 2020-12-09 PROCEDURE — 27000221 HC OXYGEN, UP TO 24 HOURS

## 2020-12-09 PROCEDURE — 84100 ASSAY OF PHOSPHORUS: CPT

## 2020-12-09 PROCEDURE — 80048 BASIC METABOLIC PNL TOTAL CA: CPT | Mod: 91

## 2020-12-09 PROCEDURE — 21400001 HC TELEMETRY ROOM

## 2020-12-09 PROCEDURE — 11000001 HC ACUTE MED/SURG PRIVATE ROOM

## 2020-12-09 PROCEDURE — 36415 COLL VENOUS BLD VENIPUNCTURE: CPT

## 2020-12-09 PROCEDURE — A4217 STERILE WATER/SALINE, 500 ML: HCPCS | Performed by: COLON & RECTAL SURGERY

## 2020-12-09 PROCEDURE — 94761 N-INVAS EAR/PLS OXIMETRY MLT: CPT

## 2020-12-09 PROCEDURE — 85025 COMPLETE CBC W/AUTO DIFF WBC: CPT

## 2020-12-09 PROCEDURE — 99024 POSTOP FOLLOW-UP VISIT: CPT | Mod: ,,, | Performed by: COLON & RECTAL SURGERY

## 2020-12-09 RX ORDER — ACETAMINOPHEN 650 MG/20.3ML
650 LIQUID ORAL EVERY 6 HOURS PRN
Status: DISCONTINUED | OUTPATIENT
Start: 2020-12-09 | End: 2020-12-10 | Stop reason: HOSPADM

## 2020-12-09 RX ADMIN — CEFEPIME HYDROCHLORIDE 2 G: 2 INJECTION, SOLUTION INTRAVENOUS at 09:12

## 2020-12-09 RX ADMIN — Medication 6 MG: at 09:12

## 2020-12-09 RX ADMIN — METRONIDAZOLE 500 MG: 500 INJECTION, SOLUTION INTRAVENOUS at 04:12

## 2020-12-09 RX ADMIN — LINEZOLID 600 MG: 600 INJECTION, SOLUTION INTRAVENOUS at 03:12

## 2020-12-09 RX ADMIN — ACETAMINOPHEN 650 MG: 160 SOLUTION ORAL at 11:12

## 2020-12-09 RX ADMIN — PROPRANOLOL HYDROCHLORIDE 20 MG: 20 TABLET ORAL at 08:12

## 2020-12-09 RX ADMIN — BACLOFEN 10 MG: 10 TABLET ORAL at 08:12

## 2020-12-09 RX ADMIN — CEFEPIME HYDROCHLORIDE 2 G: 2 INJECTION, SOLUTION INTRAVENOUS at 01:12

## 2020-12-09 RX ADMIN — BACLOFEN 10 MG: 10 TABLET ORAL at 09:12

## 2020-12-09 RX ADMIN — SODIUM BICARBONATE: 84 INJECTION, SOLUTION INTRAVENOUS at 05:12

## 2020-12-09 RX ADMIN — LINEZOLID 600 MG: 600 INJECTION, SOLUTION INTRAVENOUS at 02:12

## 2020-12-09 RX ADMIN — GABAPENTIN 250 MG: 250 SUSPENSION ORAL at 09:12

## 2020-12-09 RX ADMIN — PROPRANOLOL HYDROCHLORIDE 20 MG: 20 TABLET ORAL at 09:12

## 2020-12-09 RX ADMIN — PHENOBARBITAL 100 MG: 20 ELIXIR ORAL at 09:12

## 2020-12-09 RX ADMIN — CEFEPIME HYDROCHLORIDE 2 G: 2 INJECTION, SOLUTION INTRAVENOUS at 05:12

## 2020-12-09 RX ADMIN — METRONIDAZOLE 500 MG: 500 INJECTION, SOLUTION INTRAVENOUS at 11:12

## 2020-12-09 RX ADMIN — PROPRANOLOL HYDROCHLORIDE 20 MG: 20 TABLET ORAL at 03:12

## 2020-12-09 RX ADMIN — BACLOFEN 10 MG: 10 TABLET ORAL at 03:12

## 2020-12-09 RX ADMIN — METRONIDAZOLE 500 MG: 500 INJECTION, SOLUTION INTRAVENOUS at 08:12

## 2020-12-09 RX ADMIN — ACETAMINOPHEN 650 MG: 160 SOLUTION ORAL at 05:12

## 2020-12-09 NOTE — PLAN OF CARE
12/09/20 1530   Post-Acute Status   Post-Acute Authorization Placement   Post-Acute Placement Status Referrals Sent   Discharge Plan   Discharge Plan A Long-term acute care facility (LTAC)

## 2020-12-09 NOTE — PLAN OF CARE
VSS. Turn pt q 2 hr. IV fluids and antibiotics. Wound vac. Iglesias care. Wound care. Peg tube feeding. Meds administered as ordered. Will continue to moniotor. Call light in reach. 12 hour chart check.

## 2020-12-09 NOTE — PROGRESS NOTES
[facility]  Colorectal Surgery  Progress Note    Subjective:     History of Present Illness:  24yo M with CP and extensive osteomyelitis with decubitus ulcer who is s/p lap loop sigmoid colostomy construction on 10/29/2020 for fecal diversion. Also s/p trach/PEG. Represented to hospital with colostomy prolapse. Iglesias placed into stoma for decompression which released large amount of gas/stool. Surgery consulted for evaluation    Post-Op Info:  Procedure(s) (LRB):  REVISION, COLOSTOMY (N/A)   1 Day Post-Op     Interval History: No acute events overnight. Good ostomy function.    Medications:  Continuous Infusions:  Scheduled Meds:   acetaminophen  650 mg Per G Tube Q6H    baclofen  10 mg Per G Tube TID    ceFEPime (MAXIPIME) IVPB  2 g Intravenous Q8H    gabapentin  250 mg Per G Tube QHS    linezolid  600 mg Intravenous Q12H    metronidazole  500 mg Intravenous Q8H    PHENobarbitaL  100 mg Per G Tube QHS    propranoloL  20 mg Per G Tube TID     PRN Meds:sodium chloride, lorazepam, melatonin     Review of patient's allergies indicates:   Allergen Reactions    Morphine sulfate Hives     Objective:     Vital Signs (Most Recent):  Temp: 97.7 °F (36.5 °C) (12/09/20 1643)  Pulse: 93 (12/09/20 1643)  Resp: 18 (12/09/20 1643)  BP: 122/71 (12/09/20 1643)  SpO2: 100 % (12/09/20 1643) Vital Signs (24h Range):  Temp:  [97 °F (36.1 °C)-97.7 °F (36.5 °C)] 97.7 °F (36.5 °C)  Pulse:  [] 93  Resp:  [18-20] 18  SpO2:  [100 %] 100 %  BP: (115-122)/(69-87) 122/71     Weight: 32.2 kg (70 lb 15.8 oz)  Body mass index is 17.12 kg/m².    Intake/Output - Last 3 Shifts       12/07 0700 - 12/08 0659 12/08 0700 - 12/09 0659 12/09 0700 - 12/10 0659    P.O. 0      I.V. (mL/kg)  700 (21.7)     NG/GT   68    Total Intake(mL/kg) 0 (0) 700 (21.7) 68 (2.1)    Urine (mL/kg/hr) 950 (1.2) 1050 (1.4) 850 (2.5)    Other 0 50     Stool 700 300 100    Blood  50     Total Output 3536 8629 744    Net -7182 -352 -046                  Physical Exam  Constitutional:       General: He is not in acute distress.     Appearance: He is well-developed. He is not ill-appearing, toxic-appearing or diaphoretic.   HENT:      Right Ear: External ear normal.      Left Ear: External ear normal.      Nose: Nose normal.   Eyes:      General: No scleral icterus.  Neck:      Musculoskeletal: Normal range of motion and neck supple.      Thyroid: No thyromegaly.   Cardiovascular:      Rate and Rhythm: Normal rate and regular rhythm.      Heart sounds: Normal heart sounds. No murmur.   Pulmonary:      Effort: Pulmonary effort is normal.      Breath sounds: Normal breath sounds.   Abdominal:      Comments: Soft, mild distention; ostomy pink and viable with stool in bag   Musculoskeletal:      Comments: Extensive decubitus ulcer   Lymphadenopathy:      Cervical: No cervical adenopathy.   Skin:     General: Skin is warm and dry.   Neurological:      Comments: Nonverbal       Significant Labs:  CBC:   Recent Labs   Lab 12/09/20  0641   WBC 12.72*   RBC 3.37*   HGB 9.4*   HCT 31.6*   *   MCV 94   MCH 27.9   MCHC 29.7*     BMP:   Recent Labs   Lab 12/09/20  0641 12/09/20  1615   GLU 91 136*    138   K 3.5 2.8*    102   CO2 21* 25   BUN 22* 19   CREATININE 0.6 0.6   CALCIUM 7.4* 7.5*   MG 2.0  --      Assessment/Plan:     * Status post colostomy  S/p lap loop sigmoid colostomy with evidence of prolapse and edema with obstructive symptoms on presentation requiring rizvi decompression now s/p colostomy revision and partial colectomy with conversion to end colostomy    - okay to resume TFs via PEG  - local wound care per ostomy nurses  - okay for discharge from surgical perspective once tolerating TFs again and continues to have bowel movements  - rest of care per primary team    Decubitus ulcer of ischial area, left, stage IV  Care per primary team    UTI (urinary tract infection)  Care per primary team    Anemia, unspecified  Care per primary  team    Seizure disorder  Care per primary team    Cerebral palsy  Care per primary team        Michael Ortez MD  Colorectal Surgery  [facility]

## 2020-12-09 NOTE — ASSESSMENT & PLAN NOTE
S/p lap loop sigmoid colostomy with evidence of prolapse and edema with obstructive symptoms on presentation requiring rizvi decompression now s/p colostomy revision and partial colectomy with conversion to end colostomy    - okay to resume TFs via PEG  - local wound care per ostomy nurses  - okay for discharge from surgical perspective once tolerating TFs again and continues to have bowel movements  - rest of care per primary team

## 2020-12-09 NOTE — ASSESSMENT & PLAN NOTE
12/6:  Colostomy malfunction  Iglesias cath in place for decompression   Will keep NPO   Surgery consulted on case     12/7  General surgery following   Plan colostomy revision tomorrow, 12/8 12/9  Colostomy revision, POD # 1

## 2020-12-09 NOTE — ASSESSMENT & PLAN NOTE
K level 2.5 on admission   Monitor and replenish and needed     12/8  K level 2.6, replenish and monitor   Check Mag and Phos     12/9  K level stable, will follow

## 2020-12-09 NOTE — ASSESSMENT & PLAN NOTE
12/6:  Will consult wound care     12/7  Wound care following     12/9  Bilateral buttock, stage 4 wound vac intact

## 2020-12-09 NOTE — PROGRESS NOTES
[facility] Hospital Medicine  Progress Note    Patient Name: Glen Moscoso  MRN: [id number]  Patient Class: IP- Inpatient   Admission Date: 2020  Length of Stay: 0 days  Attending Physician: Travis Bobo MD  Primary Care Provider: Yadi Lawson MD        Subjective:     Principal Problem:Status post colostomy        HPI:  Patient is a 26 y/o  male with a PMH of seizures, colitis, acid reflux, severe PCM, Peg tube, pelvic osteo, cerebral palsy, anemia, proctocolitis to ED from [facility] due to malfunctioning colostomy. Patient was at [facility] completing abx course. He was discharged on . Per chart review patient grew VRE on last blood cultures. Unable to obtain history as patient is non-verbal without any family members at bedside. Patient presented for worsening abdominal distention and concern for colostomy malfunction. Surgery was consulted in ED and rizvi catheter was placed in sigmoid colostomy for decompression of the bowel. Lab work on arrival concerning for leukocytosis, hb.9, along with metabolic acidosis. Patient was bolused 30 cc/kg of LR and started on zyvox, cefepime, and flagyl.     Overview/Hospital Course:  Mr Moscoso is a 25 year old male who presented for [facility] for evaluation of colostomy. He at [facility] from IV antibiotic. He was noted to have HGB of 6.9 and was transfused one unit of PRBC. Currently on Bicar drip for metabolic acidosis. General Surgery consulted, plan Colostomy revision tomorrow. Potassium 2.8 this AM, replenished.   As of 2020, patient underwent colostomy revision by Dr Ortez today. Potassium 2.6 this AM, replenish, check Mag and Phos.     Interval History: s/p colostomy revision today. Will follow.     Review of Systems   Unable to perform ROS: Patient nonverbal     Objective:     Vital Signs (Most Recent):  Temp: 97.3 °F (36.3 °C) (20 1558)  Pulse: 89 (20 1558)  Resp: 18 (20 155)  BP: (!)  135/94 (12/08/20 1558)  SpO2: 100 % (12/08/20 1558) Vital Signs (24h Range):  Temp:  [96.9 °F (36.1 °C)-98.5 °F (36.9 °C)] 97.3 °F (36.3 °C)  Pulse:  [] 89  Resp:  [15-20] 18  SpO2:  [98 %-100 %] 100 %  BP: (114-162)/(70-98) 135/94     Weight: 32.2 kg (70 lb 15.8 oz)  Body mass index is 17.12 kg/m².    Intake/Output Summary (Last 24 hours) at 12/8/2020 1834  Last data filed at 12/8/2020 1055  Gross per 24 hour   Intake 700 ml   Output 1500 ml   Net -800 ml      Physical Exam  Vitals signs and nursing note reviewed.   Constitutional:       General: He is not in acute distress.     Appearance: He is cachectic. He is ill-appearing. He is not toxic-appearing or diaphoretic.   HENT:      Head: Normocephalic and atraumatic.      Nose: Nose normal.   Eyes:      General:         Right eye: No discharge.         Left eye: No discharge.   Neck:      Musculoskeletal: No neck rigidity or muscular tenderness.      Vascular: No carotid bruit.   Cardiovascular:      Heart sounds: No murmur. No friction rub. No gallop.    Pulmonary:      Effort: No respiratory distress.      Breath sounds: No stridor. No wheezing, rhonchi or rales.   Chest:      Chest wall: No tenderness.   Abdominal:      General: There is distension.      Palpations: There is no mass.      Tenderness: There is no abdominal tenderness. There is no right CVA tenderness, left CVA tenderness, guarding or rebound.      Hernia: No hernia is present.      Comments: Colonoscopy with pink stoma intact    Musculoskeletal:         General: No swelling, tenderness, deformity or signs of injury.      Right lower leg: No edema.      Left lower leg: No edema.      Comments: Small contracted legs    Lymphadenopathy:      Cervical: No cervical adenopathy.   Skin:     Capillary Refill: Capillary refill takes less than 2 seconds.      Coloration: Skin is not jaundiced or pale.      Findings: No bruising, erythema, lesion or rash.      Comments: Bilateral sacral wounds, stage  4 to wound vac    Neurological:      General: No focal deficit present.         Significant Labs:   CBC:   Recent Labs   Lab 12/07/20  0817 12/08/20  0647   WBC 13.31* 12.65   HGB 10.9* 11.1*   HCT 36.2* 36.4*   * 482*     CMP:   Recent Labs   Lab 12/07/20  1842 12/08/20  0002 12/08/20  0647    140 140   K 2.6* 2.4* 2.6*    112* 111*   CO2 20* 16* 20*   GLU 74 82 77   BUN 29* 25* 24*   CREATININE 0.6 0.6 0.6   CALCIUM 8.0* 8.0* 7.9*   ANIONGAP 8 12 9   EGFRNONAA >60 >60 >60       Significant Imaging:     Imaging Results          X-Ray Chest AP Portable (Final result)  Result time 12/06/20 13:13:24    Final result by Martin Ha MD (12/06/20 13:13:24)                 Impression:      No acute infiltrate or consolidation.  Gaseous distention of bowel loops.      Electronically signed by: Martin Ha MD  Date:    12/06/2020  Time:    13:13             Narrative:    EXAMINATION:  XR CHEST AP PORTABLE    CLINICAL HISTORY:  cough;    TECHNIQUE:  Single frontal view of the chest was performed.    COMPARISON:  10/31/2020    FINDINGS:  Tracheostomy tube.  Central line present with the tip overlying the superior vena cava.  Lung fields are clear.    The cardiac silhouette is normal in size. Gaseous distention of bowel loops similar to the prior study.                                Assessment/Plan:      * Status post colostomy  12/6:  Colostomy malfunction  Iglesias cath in place for decompression   Will keep NPO   Surgery consulted on case     12/7  General surgery following   Plan colostomy revision tomorrow, 12/8        Colostomy prolapse  S/p colostomy revision   Recommendations noted       Severe malnutrition  RD following       UTI (urinary tract infection)  12/6:  U/a and urine culture   Cont cefepime       Hypokalemia  K level 2.5 on admission   Monitor and replenish and needed     12/8  K level 2.6, replenish and monitor   Check Mag and Phos       Sepsis  12/6:  Sepsis likely secondary to  UTI  However will cover for intra-abdominal source  Empirically as well  Zyvox on board due to VRE bacteremia from previous culture  Will plan to de-escalate based on blood cultures   Continue zyvox, cefepime and flagyl  Blood cultures urine cultures pending  Lactic acid WNL  VSS     12/7  BC X 2, NGTD  Urine culture in progress   Continue IV antibiotics       Decubitus ulcer of ischial area, left, stage IV  12/6:  Will consult wound care     12/7  Wound care following       Anemia, unspecified  12/6:  hgb 6.9 on arrival  No signs of bleeding noted  Will check iron studies, folate, b12, retic  Transfuse 1 unit prbc  Cont to monitor h/h  No lovenox/heparin due to anemia    12/7  HGB stable after 1 unit of PRBC         Seizure disorder  12/6:  On phenobarbital 100mg qhs   through peg tube  Will discuss with pharmacy on IV alternatives  Ativan PRN  Seizure precautions    12/8  Continue phenobarbital 100 mg per PEG  Ativan PRN  Seizure precautions       Cerebral palsy  12/6:  Patient nonverbal at baseline  Cont to monitor         VTE Risk Mitigation (From admission, onward)    None          Discharge Planning   ROCÍO:      Code Status: Prior   Is the patient medically ready for discharge?:     Reason for patient still in hospital (select all that apply): Patient trending condition and Treatment  Discharge Plan A: Rehab                  Lanre Cohn NP  Department of Hospital Medicine   [facility]

## 2020-12-09 NOTE — ASSESSMENT & PLAN NOTE
12/6:  hgb 6.9 on arrival  No signs of bleeding noted  Will check iron studies, folate, b12, retic  Transfuse 1 unit prbc  Cont to monitor h/h  No lovenox/heparin due to anemia    12/7  HGB stable after 1 unit of PRBC     12/9   HGB 9.4

## 2020-12-09 NOTE — PROGRESS NOTES
"  [facility]  Adult Nutrition  Progress Note    SUMMARY     Recommendations    Recommendation:   1. Inititate enteral nutrition via PEG of Isosource 1.5 @ 15mL/hr and advance as tolerated to goal rate of 30mL/hr daily. Water flushes of 100mL 4x/daily  * This provides 720mL total volume; 1080 calories; 49gm protein; 560mL Free water + 400mL additional water from flushes   2. Weekly weights.    3. RD to follow up.     Goals: EN initiation within the next 48 hours  Nutrition Goal Status: goal not met  Communication of RD Recs: Plan of Care, sticky note    Reason for Assessment    Reason For Assessment: follow up   Diagnosis: (S/P colostomy)  Relevant Medical History: cerebal palsy, seizures, GERD, Stage IV Decubitis Ulcer    General Information Comments:   12/7/20 RD consulted for Enteral nutrition recommendations for PEG tube, and to assess nutrition needs due to multiple wounds. Pt with anticipated colostomy revision tomorrow. RD providing EN recommendations to initiate as soon as it is medically appropriate. He meets criteria for malnutrition. LBM today with 1100mL stool noted per documentation.   12/9/20 Pt had colostomy revision yesterday. He is not yet receiving nutrition. He has stool in his ostomy bag. PEG site being regularly cleansed.     Nutrition Discharge Planning: Discharge goal: pending medical course (most likely bolus feeds).     Nutrition Risk Screen    Nutrition Risk Screen: tube feeding or parenteral nutrition    Nutrition/Diet History    Spiritual, Cultural Beliefs, Restorationist Practices, Values that Affect Care: no  Food Allergies: NKFA  Factors Affecting Nutritional Intake: altered gastrointestinal function, difficulty/impaired swallowing, NPO    Anthropometrics    Temp: 97.4 °F (36.3 °C)  Height: 4' 6" (137.2 cm)(estimate from mom )  Height (inches): 54 in  Weight Method: Bed Scale  Weight: 32.2 kg (70 lb 15.8 oz)  Weight (lb): 70.99 lb  Ideal Body Weight (IBW), Male: 70 lb  % " Ideal Body Weight, Male (lb): 101.41 %  BMI (Calculated): 17.1  BMI Grade: 17 - 18.4 protein-energy malnutrition grade I     Lab/Procedures/Meds    Pertinent Labs Reviewed: reviewed  Pertinent Labs Comments:   Albumin 1.5, BUN 22  Pertinent Medications Reviewed: reviewed  Pertinent Medications Comments:  Propranolol, bicarb in water    Physical Findings/Assessment     Multiple wounds noted; Altered skin integrity of L ischial tuberosity, Groin, Neck, incision site to abdomen     Estimated/Assessed Needs    Weight Used For Calorie Calculations: 32.2 kg (70 lb 15.8 oz)  Energy Calorie Requirements (kcal): 1100- 1300  Energy Need Method: [place]-St Jeor(x 1-1.2)  Protein Requirements: 30- 40g(1-1.2g/kg per cerebral palsy but also malnourished)  Weight Used For Protein Calculations: 32.2 kg (70 lb 15.8 oz)  Fluid Requirements (mL): 1070  Estimated Fluid Requirement Method: RDA Method(or PER MD )  RDA Method (mL): 1100  CHO Requirement: 134    Nutrition Prescription Ordered    Current Diet Order: NPO.   Nutrition Order Comments: PEG tube    Evaluation of Received Nutrient/Fluid Intake        Intake/Output Summary (Last 24 hours) at 12/9/2020 1226  Last data filed at 12/9/2020 0629  Gross per 24 hour   Intake --   Output 1100 ml   Net -1100 ml       % Intake of Estimated Energy Needs: 0 - 25 %  % Meal Intake: NPO    Nutrition Risk    Level of Risk/Frequency of Follow-up: (2x week)     Assessment and Plan    Severe malnutrition  Nutrition Problem:  Severe Protein-Calorie Malnutrition  Malnutrition in the context of Chronic Illness/Injury    Related to (etiology):  Decreased ability to consume sufficient energy; alteration in GI structure and function.     Signs and Symptoms (as evidenced by):  Energy Intake: <50% of estimated energy requirement for 3 days (RD predicts prior to admission) with TF on hold  Body Fat Depletion: severe depletion of orbitals and triceps   Muscle Mass Depletion: severe depletion of temples,  clavicle region, scapular region, interosseous muscle and lower extremities   Weight Loss: None per EMR; malnutrition criteria met per BMI @ 17.12kg/m^2  Hx of cerebral palsy     Interventions(treatment strategy):  enteral nutrition   Medical food supplement-beneprotein (wounds)  Collaboration with other providers    Nutrition Diagnosis Status:  Continues       Monitor and Evaluation    Food and Nutrient Intake: energy intake  Food and Nutrient Adminstration: enteral and parenteral nutrition administration  Anthropometric Measurements: weight  Biochemical Data, Medical Tests and Procedures: electrolyte and renal panel, gastrointestinal profile, glucose/endocrine profile, inflammatory profile, lipid profile  Nutrition-Focused Physical Findings: overall appearance     Malnutrition Assessment  Malnutrition Type: acute illness or injury, chronic illness, social/environmental circumstances  Hair/Scalp (Micronutrient): brittle, sparse           Orbital Region (Subcutaneous Fat Loss): severe depletion  Upper Arm Region (Subcutaneous Fat Loss): severe depletion   [place] Region (Muscle Loss): severe depletion  Clavicle Bone Region (Muscle Loss): severe depletion  Clavicle and Acromion Bone Region (Muscle Loss): severe depletion       Subcutaneous Fat Loss (Final Summary): severe protein-calorie malnutrition       Nutrition Follow-Up    RD Follow-up?: Yes

## 2020-12-09 NOTE — PLAN OF CARE
Recommendations    Recommendation:   1. Inititate enteral nutrition via PEG of Isosource 1.5 @ 15mL/hr and advance as tolerated to goal rate of 30mL/hr daily. Water flushes of 100mL 4x/daily  * This provides 720mL total volume; 1080 calories; 49gm protein; 560mL Free water + 400mL additional water from flushes   2. Weekly weights.    3. RD to follow up.     Goals: EN initiation within the next 48 hours  Nutrition Goal Status: goal not met  Communication of RD Recs: Plan of Care, sticky note

## 2020-12-09 NOTE — PLAN OF CARE
Patient remained free from injury. PEG tube in place. NPO status. Colostomy bag producing brown stool. Iglesias cath in use.  Wound vac in place. Wound care performed IVF maintained. Q2 turn. Log rolls to elevate heels. Trach in use. Will continue to monitor

## 2020-12-09 NOTE — ASSESSMENT & PLAN NOTE
K level 2.5 on admission   Monitor and replenish and needed     12/8  K level 2.6, replenish and monitor   Check Mag and Phos

## 2020-12-09 NOTE — SUBJECTIVE & OBJECTIVE
Interval History: No acute events overnight. Good ostomy function.    Medications:  Continuous Infusions:  Scheduled Meds:   acetaminophen  650 mg Per G Tube Q6H    baclofen  10 mg Per G Tube TID    ceFEPime (MAXIPIME) IVPB  2 g Intravenous Q8H    gabapentin  250 mg Per G Tube QHS    linezolid  600 mg Intravenous Q12H    metronidazole  500 mg Intravenous Q8H    PHENobarbitaL  100 mg Per G Tube QHS    propranoloL  20 mg Per G Tube TID     PRN Meds:sodium chloride, lorazepam, melatonin     Review of patient's allergies indicates:   Allergen Reactions    Morphine sulfate Hives     Objective:     Vital Signs (Most Recent):  Temp: 97.7 °F (36.5 °C) (12/09/20 1643)  Pulse: 93 (12/09/20 1643)  Resp: 18 (12/09/20 1643)  BP: 122/71 (12/09/20 1643)  SpO2: 100 % (12/09/20 1643) Vital Signs (24h Range):  Temp:  [97 °F (36.1 °C)-97.7 °F (36.5 °C)] 97.7 °F (36.5 °C)  Pulse:  [] 93  Resp:  [18-20] 18  SpO2:  [100 %] 100 %  BP: (115-122)/(69-87) 122/71     Weight: 32.2 kg (70 lb 15.8 oz)  Body mass index is 17.12 kg/m².    Intake/Output - Last 3 Shifts       12/07 0700 - 12/08 0659 12/08 0700 - 12/09 0659 12/09 0700 - 12/10 0659    P.O. 0      I.V. (mL/kg)  700 (21.7)     NG/GT   68    Total Intake(mL/kg) 0 (0) 700 (21.7) 68 (2.1)    Urine (mL/kg/hr) 950 (1.2) 1050 (1.4) 850 (2.5)    Other 0 50     Stool 700 300 100    Blood  50     Total Output 1650 1450 950    Net -5270 -018 -297                 Physical Exam  Constitutional:       General: He is not in acute distress.     Appearance: He is well-developed. He is not ill-appearing, toxic-appearing or diaphoretic.   HENT:      Right Ear: External ear normal.      Left Ear: External ear normal.      Nose: Nose normal.   Eyes:      General: No scleral icterus.  Neck:      Musculoskeletal: Normal range of motion and neck supple.      Thyroid: No thyromegaly.   Cardiovascular:      Rate and Rhythm: Normal rate and regular rhythm.      Heart sounds: Normal heart sounds.  No murmur.   Pulmonary:      Effort: Pulmonary effort is normal.      Breath sounds: Normal breath sounds.   Abdominal:      Comments: Soft, mild distention; ostomy pink and viable with stool in bag   Musculoskeletal:      Comments: Extensive decubitus ulcer   Lymphadenopathy:      Cervical: No cervical adenopathy.   Skin:     General: Skin is warm and dry.   Neurological:      Comments: Nonverbal       Significant Labs:  CBC:   Recent Labs   Lab 12/09/20  0641   WBC 12.72*   RBC 3.37*   HGB 9.4*   HCT 31.6*   *   MCV 94   MCH 27.9   MCHC 29.7*     BMP:   Recent Labs   Lab 12/09/20  0641 12/09/20  1615   GLU 91 136*    138   K 3.5 2.8*    102   CO2 21* 25   BUN 22* 19   CREATININE 0.6 0.6   CALCIUM 7.4* 7.5*   MG 2.0  --

## 2020-12-09 NOTE — PROGRESS NOTES
[facility] Hospital Medicine  Progress Note    Patient Name: Glen Moscoso  MRN: [id number]  Patient Class: IP- Inpatient   Admission Date: 2020  Length of Stay: 1 days  Attending Physician: Travis Bobo MD  Primary Care Provider: Yadi Lawson MD        Subjective:     Principal Problem:Status post colostomy        HPI:  Patient is a 26 y/o  male with a PMH of seizures, colitis, acid reflux, severe PCM, Peg tube, pelvic osteo, cerebral palsy, anemia, proctocolitis to ED from [facility] due to malfunctioning colostomy. Patient was at [facility] completing abx course. He was discharged on . Per chart review patient grew VRE on last blood cultures. Unable to obtain history as patient is non-verbal without any family members at bedside. Patient presented for worsening abdominal distention and concern for colostomy malfunction. Surgery was consulted in ED and rizvi catheter was placed in sigmoid colostomy for decompression of the bowel. Lab work on arrival concerning for leukocytosis, hb.9, along with metabolic acidosis. Patient was bolused 30 cc/kg of LR and started on zyvox, cefepime, and flagyl.     Overview/Hospital Course:  Mr Moscoso is a 25 year old male who presented for [facility] for evaluation of colostomy. He at [facility] from IV antibiotic. He was noted to have HGB of 6.9 and was transfused one unit of PRBC. Currently on Bicar drip for metabolic acidosis. General Surgery consulted, plan Colostomy revision tomorrow. Potassium 2.8 this AM, replenished.   As of 2020, patient underwent colostomy revision by Dr Ortez today. Potassium 2.6 this AM, replenish, check Mag and Phos.   As of 2020, s/p colostomy revision on yesterday. Start tube feeding today, make sure tolerates, plan discharge back to [place] on tomorrow. Decrease IVF.     Interval History: Tube feeding restarted today, plan discharge tomorrow.    Review of Systems   Unable to perform ROS:  Patient nonverbal     Objective:     Vital Signs (Most Recent):  Temp: 97.4 °F (36.3 °C) (12/09/20 1215)  Pulse: 104 (12/09/20 1445)  Resp: 18 (12/09/20 1445)  BP: 119/80 (12/09/20 1215)  SpO2: 100 % (12/09/20 1445) Vital Signs (24h Range):  Temp:  [97 °F (36.1 °C)-97.5 °F (36.4 °C)] 97.4 °F (36.3 °C)  Pulse:  [] 104  Resp:  [18-20] 18  SpO2:  [100 %] 100 %  BP: (115-122)/(69-87) 119/80     Weight: 32.2 kg (70 lb 15.8 oz)  Body mass index is 17.12 kg/m².    Intake/Output Summary (Last 24 hours) at 12/9/2020 1603  Last data filed at 12/9/2020 1533  Gross per 24 hour   Intake 68 ml   Output 1200 ml   Net -1132 ml      Physical Exam  Vitals signs and nursing note reviewed.   Constitutional:       General: He is not in acute distress.     Appearance: He is cachectic. He is ill-appearing. He is not toxic-appearing or diaphoretic.   HENT:      Head: Normocephalic and atraumatic.      Nose: Nose normal.   Eyes:      General:         Right eye: No discharge.         Left eye: No discharge.   Neck:      Musculoskeletal: No neck rigidity or muscular tenderness.      Vascular: No carotid bruit.      Comments: Trach intact   Cardiovascular:      Heart sounds: No murmur. No friction rub. No gallop.    Pulmonary:      Effort: No respiratory distress.      Breath sounds: No stridor. No wheezing, rhonchi or rales.   Chest:      Chest wall: No tenderness.   Abdominal:      General: There is distension.      Palpations: There is no mass.      Tenderness: There is no abdominal tenderness. There is no right CVA tenderness, left CVA tenderness, guarding or rebound.      Hernia: No hernia is present.      Comments: Colonoscopy with pink stoma intact    Musculoskeletal:         General: No swelling, tenderness, deformity or signs of injury.      Right lower leg: No edema.      Left lower leg: No edema.      Comments: Small contracted legs    Lymphadenopathy:      Cervical: No cervical adenopathy.   Skin:     Capillary Refill:  Capillary refill takes less than 2 seconds.      Coloration: Skin is not jaundiced or pale.      Findings: No bruising, erythema, lesion or rash.      Comments: Bilateral sacral wounds, stage 4 to wound vac    Neurological:      General: No focal deficit present.         Significant Labs:   CBC:   Recent Labs   Lab 12/08/20  0647 12/09/20  0641   WBC 12.65 12.72*   HGB 11.1* 9.4*   HCT 36.4* 31.6*   * 532*     CMP:   Recent Labs   Lab 12/08/20  1915 12/09/20  0042 12/09/20  0641    140 137   K 4.5 3.6 3.5    105 105   CO2 20* 19* 21*   GLU 72 77 91   BUN 23* 24* 22*   CREATININE 0.6 0.7 0.6   CALCIUM 7.6* 7.7* 7.4*   ANIONGAP 11 16 11   EGFRNONAA >60 >60 >60       Significant Imaging:     Imaging Results          X-Ray Chest AP Portable (Final result)  Result time 12/06/20 13:13:24    Final result by Martin Ha MD (12/06/20 13:13:24)                 Impression:      No acute infiltrate or consolidation.  Gaseous distention of bowel loops.      Electronically signed by: Martin Ha MD  Date:    12/06/2020  Time:    13:13             Narrative:    EXAMINATION:  XR CHEST AP PORTABLE    CLINICAL HISTORY:  cough;    TECHNIQUE:  Single frontal view of the chest was performed.    COMPARISON:  10/31/2020    FINDINGS:  Tracheostomy tube.  Central line present with the tip overlying the superior vena cava.  Lung fields are clear.    The cardiac silhouette is normal in size. Gaseous distention of bowel loops similar to the prior study.                                Assessment/Plan:      * Status post colostomy  12/6:  Colostomy malfunction  Iglesias cath in place for decompression   Will keep NPO   Surgery consulted on case     12/7  General surgery following   Plan colostomy revision tomorrow, 12/8 12/9  Colostomy revision, POD # 1  Restart tube feeding, with H2O flushes   Hold IVF             Colostomy prolapse  S/p colostomy revision       Severe malnutrition  RD following       UTI (urinary tract  infection)  12/6:  U/a and urine culture   Cont cefepime       Hypokalemia  K level 2.5 on admission   Monitor and replenish and needed     12/8  K level 2.6, replenish and monitor   Check Mag and Phos     12/9  K level stable, will follow       Sepsis  12/6:  Sepsis likely secondary to UTI  However will cover for intra-abdominal source  Empirically as well  Zyvox on board due to VRE bacteremia from previous culture  Will plan to de-escalate based on blood cultures   Continue zyvox, cefepime and flagyl  Blood cultures urine cultures pending  Lactic acid WNL  VSS     12/7  BC X 2, NGTD  Urine culture in progress   Continue IV antibiotics       Decubitus ulcer of ischial area, left, stage IV  12/6:  Will consult wound care     12/7  Wound care following     12/9  Bilateral buttock, stage 4 wound vac intact       Anemia, unspecified  12/6:  hgb 6.9 on arrival  No signs of bleeding noted  Will check iron studies, folate, b12, retic  Transfuse 1 unit prbc  Cont to monitor h/h  No lovenox/heparin due to anemia    12/7  HGB stable after 1 unit of PRBC     12/9   HGB 9.4    Seizure disorder  12/6:  On phenobarbital 100mg qhs   through peg tube  Will discuss with pharmacy on IV alternatives  Ativan PRN  Seizure precautions    12/8  Continue phenobarbital 100 mg per PEG  Ativan PRN  Seizure precautions       Cerebral palsy  12/6:  Patient nonverbal at baseline  Cont to monitor         VTE Risk Mitigation (From admission, onward)    None          Discharge Planning   ORCÍO:      Code Status: Prior   Is the patient medically ready for discharge?:     Reason for patient still in hospital (select all that apply): Patient trending condition and Treatment  Discharge Plan A: Long-term acute care facility (LTAC)                  Lanre Cohn NP  Department of Hospital Medicine   [facility]

## 2020-12-09 NOTE — SUBJECTIVE & OBJECTIVE
Interval History: Tube feeding restarted today, plan discharge tomorrow.    Review of Systems   Unable to perform ROS: Patient nonverbal     Objective:     Vital Signs (Most Recent):  Temp: 97.4 °F (36.3 °C) (12/09/20 1215)  Pulse: 104 (12/09/20 1445)  Resp: 18 (12/09/20 1445)  BP: 119/80 (12/09/20 1215)  SpO2: 100 % (12/09/20 1445) Vital Signs (24h Range):  Temp:  [97 °F (36.1 °C)-97.5 °F (36.4 °C)] 97.4 °F (36.3 °C)  Pulse:  [] 104  Resp:  [18-20] 18  SpO2:  [100 %] 100 %  BP: (115-122)/(69-87) 119/80     Weight: 32.2 kg (70 lb 15.8 oz)  Body mass index is 17.12 kg/m².    Intake/Output Summary (Last 24 hours) at 12/9/2020 1603  Last data filed at 12/9/2020 1533  Gross per 24 hour   Intake 68 ml   Output 1200 ml   Net -1132 ml      Physical Exam  Vitals signs and nursing note reviewed.   Constitutional:       General: He is not in acute distress.     Appearance: He is cachectic. He is ill-appearing. He is not toxic-appearing or diaphoretic.   HENT:      Head: Normocephalic and atraumatic.      Nose: Nose normal.   Eyes:      General:         Right eye: No discharge.         Left eye: No discharge.   Neck:      Musculoskeletal: No neck rigidity or muscular tenderness.      Vascular: No carotid bruit.      Comments: Trach intact   Cardiovascular:      Heart sounds: No murmur. No friction rub. No gallop.    Pulmonary:      Effort: No respiratory distress.      Breath sounds: No stridor. No wheezing, rhonchi or rales.   Chest:      Chest wall: No tenderness.   Abdominal:      General: There is distension.      Palpations: There is no mass.      Tenderness: There is no abdominal tenderness. There is no right CVA tenderness, left CVA tenderness, guarding or rebound.      Hernia: No hernia is present.      Comments: Colonoscopy with pink stoma intact    Musculoskeletal:         General: No swelling, tenderness, deformity or signs of injury.      Right lower leg: No edema.      Left lower leg: No edema.       Comments: Small contracted legs    Lymphadenopathy:      Cervical: No cervical adenopathy.   Skin:     Capillary Refill: Capillary refill takes less than 2 seconds.      Coloration: Skin is not jaundiced or pale.      Findings: No bruising, erythema, lesion or rash.      Comments: Bilateral sacral wounds, stage 4 to wound vac    Neurological:      General: No focal deficit present.         Significant Labs:   CBC:   Recent Labs   Lab 12/08/20  0647 12/09/20  0641   WBC 12.65 12.72*   HGB 11.1* 9.4*   HCT 36.4* 31.6*   * 532*     CMP:   Recent Labs   Lab 12/08/20  1915 12/09/20  0042 12/09/20  0641    140 137   K 4.5 3.6 3.5    105 105   CO2 20* 19* 21*   GLU 72 77 91   BUN 23* 24* 22*   CREATININE 0.6 0.7 0.6   CALCIUM 7.6* 7.7* 7.4*   ANIONGAP 11 16 11   EGFRNONAA >60 >60 >60       Significant Imaging:     Imaging Results          X-Ray Chest AP Portable (Final result)  Result time 12/06/20 13:13:24    Final result by Martin Ha MD (12/06/20 13:13:24)                 Impression:      No acute infiltrate or consolidation.  Gaseous distention of bowel loops.      Electronically signed by: Martin Ha MD  Date:    12/06/2020  Time:    13:13             Narrative:    EXAMINATION:  XR CHEST AP PORTABLE    CLINICAL HISTORY:  cough;    TECHNIQUE:  Single frontal view of the chest was performed.    COMPARISON:  10/31/2020    FINDINGS:  Tracheostomy tube.  Central line present with the tip overlying the superior vena cava.  Lung fields are clear.    The cardiac silhouette is normal in size. Gaseous distention of bowel loops similar to the prior study.

## 2020-12-09 NOTE — SUBJECTIVE & OBJECTIVE
Interval History: s/p colostomy revision today. Will follow.     Review of Systems   Unable to perform ROS: Patient nonverbal     Objective:     Vital Signs (Most Recent):  Temp: 97.3 °F (36.3 °C) (12/08/20 1558)  Pulse: 89 (12/08/20 1558)  Resp: 18 (12/08/20 1558)  BP: (!) 135/94 (12/08/20 1558)  SpO2: 100 % (12/08/20 1558) Vital Signs (24h Range):  Temp:  [96.9 °F (36.1 °C)-98.5 °F (36.9 °C)] 97.3 °F (36.3 °C)  Pulse:  [] 89  Resp:  [15-20] 18  SpO2:  [98 %-100 %] 100 %  BP: (114-162)/(70-98) 135/94     Weight: 32.2 kg (70 lb 15.8 oz)  Body mass index is 17.12 kg/m².    Intake/Output Summary (Last 24 hours) at 12/8/2020 1834  Last data filed at 12/8/2020 1055  Gross per 24 hour   Intake 700 ml   Output 1500 ml   Net -800 ml      Physical Exam  Vitals signs and nursing note reviewed.   Constitutional:       General: He is not in acute distress.     Appearance: He is cachectic. He is ill-appearing. He is not toxic-appearing or diaphoretic.   HENT:      Head: Normocephalic and atraumatic.      Nose: Nose normal.   Eyes:      General:         Right eye: No discharge.         Left eye: No discharge.   Neck:      Musculoskeletal: No neck rigidity or muscular tenderness.      Vascular: No carotid bruit.   Cardiovascular:      Heart sounds: No murmur. No friction rub. No gallop.    Pulmonary:      Effort: No respiratory distress.      Breath sounds: No stridor. No wheezing, rhonchi or rales.   Chest:      Chest wall: No tenderness.   Abdominal:      General: There is distension.      Palpations: There is no mass.      Tenderness: There is no abdominal tenderness. There is no right CVA tenderness, left CVA tenderness, guarding or rebound.      Hernia: No hernia is present.      Comments: Colonoscopy with pink stoma intact    Musculoskeletal:         General: No swelling, tenderness, deformity or signs of injury.      Right lower leg: No edema.      Left lower leg: No edema.      Comments: Small contracted legs     Lymphadenopathy:      Cervical: No cervical adenopathy.   Skin:     Capillary Refill: Capillary refill takes less than 2 seconds.      Coloration: Skin is not jaundiced or pale.      Findings: No bruising, erythema, lesion or rash.      Comments: Bilateral sacral wounds, stage 4 to wound vac    Neurological:      General: No focal deficit present.         Significant Labs:   CBC:   Recent Labs   Lab 12/07/20  0817 12/08/20  0647   WBC 13.31* 12.65   HGB 10.9* 11.1*   HCT 36.2* 36.4*   * 482*     CMP:   Recent Labs   Lab 12/07/20  1842 12/08/20  0002 12/08/20  0647    140 140   K 2.6* 2.4* 2.6*    112* 111*   CO2 20* 16* 20*   GLU 74 82 77   BUN 29* 25* 24*   CREATININE 0.6 0.6 0.6   CALCIUM 8.0* 8.0* 7.9*   ANIONGAP 8 12 9   EGFRNONAA >60 >60 >60       Significant Imaging:     Imaging Results          X-Ray Chest AP Portable (Final result)  Result time 12/06/20 13:13:24    Final result by Martin Ha MD (12/06/20 13:13:24)                 Impression:      No acute infiltrate or consolidation.  Gaseous distention of bowel loops.      Electronically signed by: Martin Ha MD  Date:    12/06/2020  Time:    13:13             Narrative:    EXAMINATION:  XR CHEST AP PORTABLE    CLINICAL HISTORY:  cough;    TECHNIQUE:  Single frontal view of the chest was performed.    COMPARISON:  10/31/2020    FINDINGS:  Tracheostomy tube.  Central line present with the tip overlying the superior vena cava.  Lung fields are clear.    The cardiac silhouette is normal in size. Gaseous distention of bowel loops similar to the prior study.

## 2020-12-09 NOTE — PROGRESS NOTES
F/U visit with Mr. Moscoso for continued wound vac management and ostomy management. Patient awake and alert. He is now POD 1 colostomy revision per Dr. Ortez.     Wound vac dressing to bilateral Ischium stage 4 pressure injuries completed. Good budding granulation tissue noted to wound beds today, exposed bone still noted to right ischium. Moderate amount serosanguinous drainage in cannister. Cleansed with saline and patted dry. Nayely wound skin prepped with cavilon, edges lined with ostomy rings and drape. One piece black foam cut to size and applied to fill wound beds bilaterally (2 total pieces) and sealed with drape. sensatrac pads and tubing applied and attached via Y-connector to I wound VAC ULTA at continuous -125 mmHg low intensity suction with good seal noted. Patient then positioned on right side with pillow support, heels floated with towel rolls.    Revised LLQ colostomy noted, stoma pink and moist, round, mildly edematous. Surgical pouch intact with no leak. Small amount seedy liquid brown stool noted in pouch. Pouch left intact at this time. Recommend flat ENOCH pouch.     Patient is on specialty GRAY IMMERSE bed. Plan for wound vac dressing changes q MWF. Will follow.

## 2020-12-09 NOTE — ASSESSMENT & PLAN NOTE
12/6:  On phenobarbital 100mg qhs   through peg tube  Will discuss with pharmacy on IV alternatives  Ativan PRN  Seizure precautions    12/8  Continue phenobarbital 100 mg per PEG  Ativan PRN  Seizure precautions

## 2020-12-10 VITALS
DIASTOLIC BLOOD PRESSURE: 82 MMHG | RESPIRATION RATE: 17 BRPM | OXYGEN SATURATION: 97 % | HEART RATE: 95 BPM | TEMPERATURE: 97 F | BODY MASS INDEX: 13.94 KG/M2 | WEIGHT: 71 LBS | HEIGHT: 60 IN | SYSTOLIC BLOOD PRESSURE: 145 MMHG

## 2020-12-10 PROBLEM — K94.09 COLOSTOMY PROLAPSE: Status: RESOLVED | Noted: 2020-12-08 | Resolved: 2020-12-10

## 2020-12-10 PROBLEM — E87.6 HYPOKALEMIA: Status: RESOLVED | Noted: 2020-11-13 | Resolved: 2020-12-10

## 2020-12-10 PROBLEM — A41.9 SEPSIS: Status: RESOLVED | Noted: 2020-09-20 | Resolved: 2020-12-10

## 2020-12-10 LAB
ANION GAP SERPL CALC-SCNC: 11 MMOL/L (ref 8–16)
ANION GAP SERPL CALC-SCNC: 14 MMOL/L (ref 8–16)
ANISOCYTOSIS BLD QL SMEAR: SLIGHT
BASOPHILS # BLD AUTO: 0.03 K/UL (ref 0–0.2)
BASOPHILS NFR BLD: 0.2 % (ref 0–1.9)
BUN SERPL-MCNC: 21 MG/DL (ref 6–20)
BUN SERPL-MCNC: 21 MG/DL (ref 6–20)
CALCIUM SERPL-MCNC: 7.7 MG/DL (ref 8.7–10.5)
CALCIUM SERPL-MCNC: 7.8 MG/DL (ref 8.7–10.5)
CHLORIDE SERPL-SCNC: 100 MMOL/L (ref 95–110)
CHLORIDE SERPL-SCNC: 102 MMOL/L (ref 95–110)
CO2 SERPL-SCNC: 21 MMOL/L (ref 23–29)
CO2 SERPL-SCNC: 24 MMOL/L (ref 23–29)
CREAT SERPL-MCNC: 0.6 MG/DL (ref 0.5–1.4)
CREAT SERPL-MCNC: 0.7 MG/DL (ref 0.5–1.4)
DIFFERENTIAL METHOD: ABNORMAL
EOSINOPHIL # BLD AUTO: 0.2 K/UL (ref 0–0.5)
EOSINOPHIL NFR BLD: 0.9 % (ref 0–8)
ERYTHROCYTE [DISTWIDTH] IN BLOOD BY AUTOMATED COUNT: 18.8 % (ref 11.5–14.5)
EST. GFR  (AFRICAN AMERICAN): >60 ML/MIN/1.73 M^2
EST. GFR  (AFRICAN AMERICAN): >60 ML/MIN/1.73 M^2
EST. GFR  (NON AFRICAN AMERICAN): >60 ML/MIN/1.73 M^2
EST. GFR  (NON AFRICAN AMERICAN): >60 ML/MIN/1.73 M^2
GLUCOSE SERPL-MCNC: 112 MG/DL (ref 70–110)
GLUCOSE SERPL-MCNC: 113 MG/DL (ref 70–110)
HCT VFR BLD AUTO: 34.5 % (ref 40–54)
HGB BLD-MCNC: 10.4 G/DL (ref 14–18)
IMM GRANULOCYTES # BLD AUTO: 0.1 K/UL (ref 0–0.04)
IMM GRANULOCYTES NFR BLD AUTO: 0.6 % (ref 0–0.5)
LYMPHOCYTES # BLD AUTO: 2.1 K/UL (ref 1–4.8)
LYMPHOCYTES NFR BLD: 11.9 % (ref 18–48)
MAGNESIUM SERPL-MCNC: 2 MG/DL (ref 1.6–2.6)
MCH RBC QN AUTO: 27.7 PG (ref 27–31)
MCHC RBC AUTO-ENTMCNC: 30.1 G/DL (ref 32–36)
MCV RBC AUTO: 92 FL (ref 82–98)
MONOCYTES # BLD AUTO: 2.3 K/UL (ref 0.3–1)
MONOCYTES NFR BLD: 13.4 % (ref 4–15)
NEUTROPHILS # BLD AUTO: 12.6 K/UL (ref 1.8–7.7)
NEUTROPHILS NFR BLD: 73 % (ref 38–73)
NRBC BLD-RTO: 0 /100 WBC
OVALOCYTES BLD QL SMEAR: ABNORMAL
PHOSPHATE SERPL-MCNC: 2.7 MG/DL (ref 2.7–4.5)
PLATELET # BLD AUTO: 536 K/UL (ref 150–350)
PLATELET BLD QL SMEAR: ABNORMAL
PMV BLD AUTO: 9.5 FL (ref 9.2–12.9)
POIKILOCYTOSIS BLD QL SMEAR: SLIGHT
POTASSIUM SERPL-SCNC: 3.2 MMOL/L (ref 3.5–5.1)
POTASSIUM SERPL-SCNC: 3.6 MMOL/L (ref 3.5–5.1)
RBC # BLD AUTO: 3.75 M/UL (ref 4.6–6.2)
SODIUM SERPL-SCNC: 135 MMOL/L (ref 136–145)
SODIUM SERPL-SCNC: 137 MMOL/L (ref 136–145)
TARGETS BLD QL SMEAR: ABNORMAL
WBC # BLD AUTO: 17.28 K/UL (ref 3.9–12.7)

## 2020-12-10 PROCEDURE — 87205 SMEAR GRAM STAIN: CPT

## 2020-12-10 PROCEDURE — 25000003 PHARM REV CODE 250: Performed by: COLON & RECTAL SURGERY

## 2020-12-10 PROCEDURE — 83735 ASSAY OF MAGNESIUM: CPT

## 2020-12-10 PROCEDURE — 87070 CULTURE OTHR SPECIMN AEROBIC: CPT

## 2020-12-10 PROCEDURE — 87077 CULTURE AEROBIC IDENTIFY: CPT | Mod: 59

## 2020-12-10 PROCEDURE — 85025 COMPLETE CBC W/AUTO DIFF WBC: CPT

## 2020-12-10 PROCEDURE — 84100 ASSAY OF PHOSPHORUS: CPT

## 2020-12-10 PROCEDURE — 25000003 PHARM REV CODE 250: Performed by: NURSE PRACTITIONER

## 2020-12-10 PROCEDURE — 36415 COLL VENOUS BLD VENIPUNCTURE: CPT

## 2020-12-10 PROCEDURE — 99900035 HC TECH TIME PER 15 MIN (STAT)

## 2020-12-10 PROCEDURE — 94761 N-INVAS EAR/PLS OXIMETRY MLT: CPT

## 2020-12-10 PROCEDURE — 63600175 PHARM REV CODE 636 W HCPCS: Performed by: COLON & RECTAL SURGERY

## 2020-12-10 PROCEDURE — 27100108

## 2020-12-10 PROCEDURE — 87186 SC STD MICRODIL/AGAR DIL: CPT | Mod: 59

## 2020-12-10 PROCEDURE — 27201112

## 2020-12-10 PROCEDURE — 27200966 HC CLOSED SUCTION SYSTEM

## 2020-12-10 PROCEDURE — 27000221 HC OXYGEN, UP TO 24 HOURS

## 2020-12-10 PROCEDURE — 80048 BASIC METABOLIC PNL TOTAL CA: CPT

## 2020-12-10 PROCEDURE — S0030 INJECTION, METRONIDAZOLE: HCPCS | Performed by: COLON & RECTAL SURGERY

## 2020-12-10 RX ORDER — LINEZOLID 100 MG/5ML
600 GRANULE, FOR SUSPENSION ORAL EVERY 12 HOURS
Qty: 300 ML | Refills: 0 | Status: ON HOLD
Start: 2020-12-10 | End: 2020-12-22 | Stop reason: HOSPADM

## 2020-12-10 RX ORDER — MUPIROCIN 20 MG/G
OINTMENT TOPICAL 2 TIMES DAILY
Status: DISCONTINUED | OUTPATIENT
Start: 2020-12-10 | End: 2020-12-10 | Stop reason: HOSPADM

## 2020-12-10 RX ORDER — LINEZOLID 100 MG/5ML
600 GRANULE, FOR SUSPENSION ORAL EVERY 12 HOURS
Qty: 300 ML | Refills: 0 | Status: SHIPPED | OUTPATIENT
Start: 2020-12-10 | End: 2020-12-10 | Stop reason: SDUPTHER

## 2020-12-10 RX ADMIN — PROPRANOLOL HYDROCHLORIDE 20 MG: 20 TABLET ORAL at 02:12

## 2020-12-10 RX ADMIN — POTASSIUM BICARBONATE 40 MEQ: 391 TABLET, EFFERVESCENT ORAL at 06:12

## 2020-12-10 RX ADMIN — BACLOFEN 10 MG: 10 TABLET ORAL at 08:12

## 2020-12-10 RX ADMIN — METRONIDAZOLE 500 MG: 500 INJECTION, SOLUTION INTRAVENOUS at 08:12

## 2020-12-10 RX ADMIN — BACLOFEN 10 MG: 10 TABLET ORAL at 02:12

## 2020-12-10 RX ADMIN — PROPRANOLOL HYDROCHLORIDE 20 MG: 20 TABLET ORAL at 08:12

## 2020-12-10 RX ADMIN — METRONIDAZOLE 500 MG: 500 INJECTION, SOLUTION INTRAVENOUS at 05:12

## 2020-12-10 RX ADMIN — CEFEPIME HYDROCHLORIDE 2 G: 2 INJECTION, SOLUTION INTRAVENOUS at 05:12

## 2020-12-10 RX ADMIN — CEFEPIME HYDROCHLORIDE 2 G: 2 INJECTION, SOLUTION INTRAVENOUS at 02:12

## 2020-12-10 RX ADMIN — LINEZOLID 600 MG: 600 INJECTION, SOLUTION INTRAVENOUS at 03:12

## 2020-12-10 NOTE — PLAN OF CARE
Swer notified liasion of pt's d/c. Swer sent d/c orders, summary, and AVS via [facility].     [facility] [place]'s nurse number for report ([phone] ask for Charge nurse). Once report is given, facility will arrange transport. John Erwin LMSW 12/10/2020 3:53 PM        12/10/20 1401   Post-Acute Status   Post-Acute Authorization Placement   Post-Acute Placement Status Set-up Complete   Discharge Plan   Discharge Plan A Long-term acute care facility (LTAC)

## 2020-12-10 NOTE — PLAN OF CARE
Pt tolerating TF well and no residuals, so increased rate to 25m/hr. Ostomy with good function. Wound vac in place, seal intact. IV abx infused as ordered. Turning q 2. Trach in place. Iglesias draining clear yellow urine. NSR on the monitor. No seizure activity. Chart reviewed.

## 2020-12-10 NOTE — PLAN OF CARE
12/10/20 1550   Final Note   Assessment Type Final Discharge Note   Anticipated Discharge Disposition Long Term   Right Care Referral Info   Post Acute Recommendation Other  (LTAC)   Facility Name [facility]

## 2020-12-11 LAB
BACTERIA BLD CULT: NORMAL
BACTERIA BLD CULT: NORMAL

## 2020-12-11 NOTE — DISCHARGE SUMMARY
[facility] Hospital Medicine  Discharge Summary      Patient Name: Glen Moscoso  MRN: [id number]  Admission Date: 2020  Hospital Length of Stay: 2 days  Discharge Date and Time: 12/10/2020  6:28 pm  Attending Physician: Travis Bobo MD   Discharging Provider: Lanre Cohn NP  Primary Care Provider: Yadi Lawson MD      HPI:   Patient is a 26 y/o  male with a PMH of seizures, colitis, acid reflux, severe PCM, Peg tube, pelvic osteo, cerebral palsy, anemia, proctocolitis to ED from [facility] due to malfunctioning colostomy. Patient was at [facility] completing abx course. He was discharged on . Per chart review patient grew VRE on last blood cultures. Unable to obtain history as patient is non-verbal without any family members at bedside. Patient presented for worsening abdominal distention and concern for colostomy malfunction. Surgery was consulted in ED and rizvi catheter was placed in sigmoid colostomy for decompression of the bowel. Lab work on arrival concerning for leukocytosis, hb.9, along with metabolic acidosis. Patient was bolused 30 cc/kg of LR and started on zyvox, cefepime, and flagyl.     Procedure(s) (LRB):  REVISION, COLOSTOMY (N/A)      Hospital Course:   Mr Moscoso is a 25 year old male who presented for [facility] for evaluation of colostomy. Pt previously receiving IV antibiotics at [facility]. He was noted to have HGB of 6.9 and was transfused one unit of PRBC. Currently on Bicar drip for metabolic acidosis. General Surgery consulted, plan colostomy revision. Potassium 2.8 this AM, replenished. As of 2020, patient status post revision by Dr Ortez today. Potassium 2.6 this AM, replenish, check Mag and Phos, he was continued on NPO x meds.  As of 2020, s/p colostomy revision on yesterday. Start tube feeding with water q 6 hour water slush, make sure tolerates tube feeding, plan discharge back to [place]. As of 12 /10, vital signs and  labs stable. Patient tolerating tube feeding, brown liquid BM noted in colostomy bag. Potassium 3.2, replenish. Patient was seen, examined and deemed suitable for discharge.       Consults:   Consults (From admission, onward)        Status Ordering Provider     Inpatient consult to Registered Dietitian/Nutritionist  Once     Provider:  (Not yet assigned)    Completed JANE DIAL     Inpatient consult to Respiratory Care  Once     Provider:  (Not yet assigned)    Acknowledged PHONG ORTEZ     IP consult to case management  Once     Provider:  (Not yet assigned)    Completed JANE DIAL          No new Assessment & Plan notes have been filed under this hospital service since the last note was generated.  Service: Hospital Medicine    Final Active Diagnoses:    Diagnosis Date Noted POA    PRINCIPAL PROBLEM:  Status post colostomy [Z93.3] 10/31/2020 Not Applicable    Severe malnutrition [E43] 12/07/2020 Yes    UTI (urinary tract infection) [N39.0] 12/06/2020 Yes    Decubitus ulcer of ischial area, left, stage IV [L89.324] 09/10/2020 Yes     Chronic    Anemia, unspecified [D64.9] 08/30/2020 Yes    Seizure disorder [G40.909] 08/26/2020 Yes     Chronic    Cerebral palsy [G80.9] 08/04/2020 Yes     Chronic      Problems Resolved During this Admission:    Diagnosis Date Noted Date Resolved POA    Colostomy prolapse [K94.09] 12/08/2020 12/10/2020 Yes    Hypokalemia [E87.6] 11/13/2020 12/10/2020 Yes    Sepsis [A41.9] 09/20/2020 12/10/2020 Yes       Discharged Condition: stable    Disposition: Long Term Care    Follow Up:  Follow-up Information     Phong Ortez MD. Schedule an appointment as soon as possible for a visit in 1 week.    Specialties: Colon and Rectal Surgery, General Surgery  Why: hospital follow up for colonscopy revision   Contact information:  [address] [place] [address] DR Cristiano QUICK [address] [phone]                 Patient Instructions:      Notify your health care provider if you  "experience any of the following:  redness, tenderness, or signs of infection (pain, swelling, redness, odor or green/yellow discharge around incision site)     Notify your health care provider if you experience any of the following:  temperature >100.4     Change dressing (specify)   Order Comments: Specialty SizeWise GRAY IMMERSE bed ordered for patient at this time. Please place patient on bed once delivered.   Turn q2h with pillow support  Elevate heels off mattress with rolled towels  Pad skin below medical devices/tubing      Stage 2 pressure injuries to bilateral proximal ears:  1. Cleanse with saline  2. Pat dry  3. Paint with cavilon  4. Apply foam dressings  5. Change weekly     Stage 4 pressure injuries bilateral ischium - maintain wound vac dressings  Continue wound vac      Healing stage 3 pressure injury scrotum - cleanse with bath wipes gently, apply thin layer critic aid paste moisture barrier BID and prn, elevate scrotum with rolled wash cloth.     Colostomy - Please review [place]'s procedure "Pouching : Colostomy or Ileostomy" for instructions on ostomy.stoma care. Change ostomy appliance weekly or IMMEDIATELY IF LEAKING. All ostomy care supplies can be requested from materials management.     OSTOMY CARE SUPPLIES:  One Piece Flat Hulen Pouch #1040         Brava Stoma Ring #[id number]       Cavilon Spray #[id number]                                            Tube Feedings/Formulas   Scheduling Instructions: Recommendation:   1. Inititate enteral nutrition via PEG of Isosource 1.5 @ 15mL/hr and advance as tolerated to goal rate of 30mL/hr daily.     Water flushes of 100mL 4x/daily     Order Specific Question Answer Comments   Select Adult Formula: Isosource 1.5 jarred    Route: Gastrostomy    Formula Rate (mL/hr): 30      Activity as tolerated       Significant Diagnostic Studies: Labs:   CMP   Recent Labs   Lab 12/09/20  1615 12/09/20  2332 12/10/20  0705    137 135*   K 2.8* 3.6 3.2*    102 100   CO2 25 " 21* 24   * 112* 113*   BUN 19 21* 21*   CREATININE 0.6 0.7 0.6   CALCIUM 7.5* 7.7* 7.8*   ANIONGAP 11 14 11   ESTGFRAFRICA >60 >60 >60   EGFRNONAA >60 >60 >60    and CBC   Recent Labs   Lab 12/09/20  0641 12/10/20  0705   WBC 12.72* 17.28*   HGB 9.4* 10.4*   HCT 31.6* 34.5*   * 536*       Pending Diagnostic Studies:     Procedure Component Value Units Date/Time    Specimen to Pathology, Surgery General Surgery [[id number]] Collected: 12/08/20 1100    Order Status: Sent Lab Status: In process Updated: 12/10/20 1134         Medications:  Reconciled Home Medications:      Medication List      START taking these medications    linezolid 100 mg/5 mL Susr  Commonly known as: ZYVOX  30 mLs (600 mg total) by Per G Tube route every 12 (twelve) hours. for 5 days        CHANGE how you take these medications    propranoloL 20 MG tablet  Commonly known as: INDERAL  1 tablet (20 mg total) by Per G Tube route 3 (three) times daily.  What changed: when to take this        CONTINUE taking these medications    acetaminophen 325 MG tablet  Commonly known as: TYLENOL  Take 325 mg by mouth every 4 (four) hours as needed for Pain.     ascorbic acid (vitamin C) 500 MG tablet  Commonly known as: VITAMIN C  1 tablet (500 mg total) by Per G Tube route once daily.     baclofen 10 MG tablet  Commonly known as: LIORESAL  1 tablet (10 mg total) by Per G Tube route 3 (three) times daily.     bisacodyL 10 mg Supp  Commonly known as: DULCOLAX  Place 1 suppository (10 mg total) rectally daily as needed (Until bowel movement if patient has no bowel movement for 2 days).     diphenhydrAMINE 12.5 mg/5 mL elixir  Commonly known as: BENADRYL  10 mLs (25 mg total) by Per G Tube route 4 (four) times daily as needed for Allergies.     gabapentin 250 mg/5 mL solution  Commonly known as: NEURONTIN  5 mLs (250 mg total) by Per G Tube route nightly. At bedtime     CHEO 7-7-1.5 gram Pwpk  Generic drug: arginine-glutamine-calcium HMB  Take by mouth  2 (two) times a day.     lactobacillus acidophilus & bulgar 100 million cell packet  Commonly known as: LACTINEX  Take 1 tablet by mouth once daily.     melatonin 3 mg tablet  Commonly known as: MELATIN  2 tablets (6 mg total) by Per G Tube route nightly as needed for Insomnia.     miconazole nitrate 2% 2 % Oint  Commonly known as: MICOTIN  Apply topically 2 (two) times daily. for 14 days     multivitamin liquid no.118 Liqd  10 mLs by Per G Tube route once daily.     PHENobarbitaL 20 mg/5 mL (4 mg/mL) Elix elixir  25 mLs (100 mg total) by Per G Tube route every evening.     potassium chloride 20 mEq Pack  Commonly known as: KLOR-CON  DISSOLVE 2 TABLETS AS DIRECTED AND DRINK DAILY            Indwelling Lines/Drains at time of discharge:   Lines/Drains/Airways     Central Venous Catheter Line            Tunneled Central Line Insertion/Assessment - Double Lumen  09/28/20 1400 right subclavian 73 days          Drain                 Gastrostomy/Enterostomy 08/04/20 1653 Percutaneous endoscopic gastrostomy (PEG) feeding 128 days         Gastrostomy/Enterostomy 10/22/20 1300 LUQ 49 days         Colostomy 10/29/20 1349 Loop LLQ 42 days         Urethral Catheter 11/13/20 1230 Latex 16 Fr. 27 days         Urethral Catheter 12/06/20 1207 Latex 16 Fr. 4 days          Airway                 Surgical Airway 10/27/20 0855 Shiley Cuffed 44 days         Airway - Non-Surgical 12/08/20 0924 Endotracheal Tube 2 days                Time spent on the discharge of patient: 40 minutes  Patient was seen and examined on the date of discharge and determined to be suitable for discharge.         Lanre Cohn NP  Department of Hospital Medicine  [facility]

## 2020-12-11 NOTE — NURSING
SHAYY picked up pt for discharge to [place]. Pt left in stable condition with non-rebreather via trach.

## 2020-12-11 NOTE — PROGRESS NOTES
Discharge paperwork transferred to facility. PT unable to verbalize. Spoke to mom about pt being discharged to [place]. Pt mom verbalized an understanding. Upon me giving report, the nurse ellie stated to leave the pt IV's and central line in. Pt rizvi catheter is still in as well. Tube feeding has been discontinued. Cardiac monitoring has been discontinued. Wound vac has been discontinued. Wet to dry dressing applied bilaterally to ischial area. Pt awaiting to be transferred by ambulance.

## 2020-12-13 ENCOUNTER — HOSPITAL ENCOUNTER (INPATIENT)
Facility: HOSPITAL | Age: 25
LOS: 9 days | Discharge: NURSING, REHAB OR LONG-TERM CARE | DRG: 871 | End: 2020-12-22
Attending: EMERGENCY MEDICINE | Admitting: FAMILY MEDICINE
Payer: MEDICAID

## 2020-12-13 DIAGNOSIS — I24.89 DEMAND ISCHEMIA: ICD-10-CM

## 2020-12-13 DIAGNOSIS — R79.89 ABNORMAL LFTS: ICD-10-CM

## 2020-12-13 DIAGNOSIS — D64.9 ACUTE ON CHRONIC ANEMIA: ICD-10-CM

## 2020-12-13 DIAGNOSIS — G80.9 CEREBRAL PALSY, UNSPECIFIED TYPE: ICD-10-CM

## 2020-12-13 DIAGNOSIS — G93.40 ENCEPHALOPATHY: ICD-10-CM

## 2020-12-13 DIAGNOSIS — K92.1 MELANOTIC STOOLS: ICD-10-CM

## 2020-12-13 DIAGNOSIS — A41.9 SEPSIS, DUE TO UNSPECIFIED ORGANISM, UNSPECIFIED WHETHER ACUTE ORGAN DYSFUNCTION PRESENT: ICD-10-CM

## 2020-12-13 DIAGNOSIS — R47.01 NONVERBAL: ICD-10-CM

## 2020-12-13 DIAGNOSIS — R65.10 SIRS (SYSTEMIC INFLAMMATORY RESPONSE SYNDROME): ICD-10-CM

## 2020-12-13 DIAGNOSIS — M24.50 SEVERE FLEXION CONTRACTURES OF ALL JOINTS: ICD-10-CM

## 2020-12-13 DIAGNOSIS — R94.31 ABNORMAL ECG: ICD-10-CM

## 2020-12-13 DIAGNOSIS — Z97.8 CHRONIC INDWELLING FOLEY CATHETER: ICD-10-CM

## 2020-12-13 DIAGNOSIS — N39.0 CHRONIC UTI: ICD-10-CM

## 2020-12-13 DIAGNOSIS — R00.0 TACHYCARDIA: ICD-10-CM

## 2020-12-13 DIAGNOSIS — K92.2 GASTROINTESTINAL HEMORRHAGE, UNSPECIFIED GASTROINTESTINAL HEMORRHAGE TYPE: Primary | ICD-10-CM

## 2020-12-13 PROBLEM — L89.159 SACRAL DECUBITUS ULCER: Status: ACTIVE | Noted: 2020-12-13

## 2020-12-13 PROBLEM — Z86.19 HISTORY OF INFECTION WITH VANCOMYCIN RESISTANT ENTEROCOCCUS (VRE): Status: ACTIVE | Noted: 2020-12-13

## 2020-12-13 PROBLEM — Z86.19: Status: ACTIVE | Noted: 2020-12-13

## 2020-12-13 PROBLEM — I16.0 HYPERTENSIVE URGENCY: Status: ACTIVE | Noted: 2020-12-13

## 2020-12-13 LAB
ABO + RH BLD: NORMAL
ALBUMIN SERPL BCP-MCNC: 1.4 G/DL (ref 3.5–5.2)
ALP SERPL-CCNC: 1622 U/L (ref 55–135)
ALT SERPL W/O P-5'-P-CCNC: 34 U/L (ref 10–44)
ANION GAP SERPL CALC-SCNC: 7 MMOL/L (ref 8–16)
APTT BLDCRRT: <21 SEC (ref 21–32)
AST SERPL-CCNC: 77 U/L (ref 10–40)
BACTERIA #/AREA URNS HPF: ABNORMAL /HPF
BASOPHILS # BLD AUTO: 0.01 K/UL (ref 0–0.2)
BASOPHILS NFR BLD: 0.1 % (ref 0–1.9)
BILIRUB SERPL-MCNC: 0.5 MG/DL (ref 0.1–1)
BILIRUB UR QL STRIP: NEGATIVE
BLD GP AB SCN CELLS X3 SERPL QL: NORMAL
BLD PROD TYP BPU: NORMAL
BLOOD UNIT EXPIRATION DATE: NORMAL
BLOOD UNIT TYPE CODE: 600
BLOOD UNIT TYPE: NORMAL
BUN SERPL-MCNC: 46 MG/DL (ref 6–20)
CALCIUM SERPL-MCNC: 7.8 MG/DL (ref 8.7–10.5)
CHLORIDE SERPL-SCNC: 108 MMOL/L (ref 95–110)
CLARITY UR: ABNORMAL
CO2 SERPL-SCNC: 26 MMOL/L (ref 23–29)
CODING SYSTEM: NORMAL
COLOR UR: YELLOW
CREAT SERPL-MCNC: 0.7 MG/DL (ref 0.5–1.4)
DIFFERENTIAL METHOD: ABNORMAL
DISPENSE STATUS: NORMAL
EOSINOPHIL # BLD AUTO: 0 K/UL (ref 0–0.5)
EOSINOPHIL NFR BLD: 0.1 % (ref 0–8)
ERYTHROCYTE [DISTWIDTH] IN BLOOD BY AUTOMATED COUNT: 18.4 % (ref 11.5–14.5)
EST. GFR  (AFRICAN AMERICAN): >60 ML/MIN/1.73 M^2
EST. GFR  (NON AFRICAN AMERICAN): >60 ML/MIN/1.73 M^2
GGT SERPL-CCNC: 2261 U/L (ref 8–55)
GLUCOSE SERPL-MCNC: 141 MG/DL (ref 70–110)
GLUCOSE UR QL STRIP: NEGATIVE
GRAN CASTS #/AREA URNS LPF: 10 /LPF
HCT VFR BLD AUTO: 28.9 % (ref 40–54)
HCT VFR BLD AUTO: 31.1 % (ref 40–54)
HCT VFR BLD AUTO: 31.1 % (ref 40–54)
HCT VFR BLD AUTO: 35 % (ref 40–54)
HCT VFR BLD AUTO: 35 % (ref 40–54)
HGB BLD-MCNC: 8.6 G/DL (ref 14–18)
HGB UR QL STRIP: ABNORMAL
HYALINE CASTS #/AREA URNS LPF: 0 /LPF
IMM GRANULOCYTES # BLD AUTO: 0.1 K/UL (ref 0–0.04)
IMM GRANULOCYTES NFR BLD AUTO: 0.6 % (ref 0–0.5)
INR PPP: 1.2 (ref 0.8–1.2)
KETONES UR QL STRIP: NEGATIVE
LACTATE SERPL-SCNC: 1.5 MMOL/L (ref 0.5–2.2)
LEUKOCYTE ESTERASE UR QL STRIP: ABNORMAL
LIPASE SERPL-CCNC: 258 U/L (ref 4–60)
LYMPHOCYTES # BLD AUTO: 1.4 K/UL (ref 1–4.8)
LYMPHOCYTES NFR BLD: 8.3 % (ref 18–48)
MAGNESIUM SERPL-MCNC: 2.2 MG/DL (ref 1.6–2.6)
MCH RBC QN AUTO: 27.5 PG (ref 27–31)
MCHC RBC AUTO-ENTMCNC: 29.8 G/DL (ref 32–36)
MCV RBC AUTO: 92 FL (ref 82–98)
MICROSCOPIC COMMENT: ABNORMAL
MONOCYTES # BLD AUTO: 1.6 K/UL (ref 0.3–1)
MONOCYTES NFR BLD: 9.3 % (ref 4–15)
NEUTROPHILS # BLD AUTO: 13.8 K/UL (ref 1.8–7.7)
NEUTROPHILS NFR BLD: 81.6 % (ref 38–73)
NITRITE UR QL STRIP: NEGATIVE
NRBC BLD-RTO: 0 /100 WBC
NUM UNITS TRANS PACKED RBC: NORMAL
OB PNL STL: POSITIVE
PH UR STRIP: 6 [PH] (ref 5–8)
PLATELET # BLD AUTO: 426 K/UL (ref 150–350)
PMV BLD AUTO: 9.7 FL (ref 9.2–12.9)
POTASSIUM SERPL-SCNC: 3.9 MMOL/L (ref 3.5–5.1)
PROCALCITONIN SERPL IA-MCNC: 1.57 NG/ML
PROT SERPL-MCNC: 5.9 G/DL (ref 6–8.4)
PROT UR QL STRIP: ABNORMAL
PROTHROMBIN TIME: 12.6 SEC (ref 9–12.5)
RBC # BLD AUTO: 3.13 M/UL (ref 4.6–6.2)
RBC #/AREA URNS HPF: 3 /HPF (ref 0–4)
SARS-COV-2 RDRP RESP QL NAA+PROBE: NEGATIVE
SODIUM SERPL-SCNC: 141 MMOL/L (ref 136–145)
SP GR UR STRIP: 1.02 (ref 1–1.03)
SQUAMOUS #/AREA URNS HPF: 5 /HPF
TROPONIN I SERPL DL<=0.01 NG/ML-MCNC: 0.11 NG/ML (ref 0–0.03)
TROPONIN I SERPL DL<=0.01 NG/ML-MCNC: <0.006 NG/ML (ref 0–0.03)
URATE CRY URNS QL MICRO: ABNORMAL
URN SPEC COLLECT METH UR: ABNORMAL
UROBILINOGEN UR STRIP-ACNC: NEGATIVE EU/DL
WBC # BLD AUTO: 16.9 K/UL (ref 3.9–12.7)
WBC #/AREA URNS HPF: 3 /HPF (ref 0–5)

## 2020-12-13 PROCEDURE — 36430 TRANSFUSION BLD/BLD COMPNT: CPT

## 2020-12-13 PROCEDURE — 93010 ELECTROCARDIOGRAM REPORT: CPT | Mod: ,,, | Performed by: INTERNAL MEDICINE

## 2020-12-13 PROCEDURE — 99232 PR SUBSEQUENT HOSPITAL CARE,LEVL II: ICD-10-PCS | Mod: ,,, | Performed by: INTERNAL MEDICINE

## 2020-12-13 PROCEDURE — 93010 EKG 12-LEAD: ICD-10-PCS | Mod: ,,, | Performed by: INTERNAL MEDICINE

## 2020-12-13 PROCEDURE — 85610 PROTHROMBIN TIME: CPT

## 2020-12-13 PROCEDURE — 85730 THROMBOPLASTIN TIME PARTIAL: CPT

## 2020-12-13 PROCEDURE — 63600175 PHARM REV CODE 636 W HCPCS: Performed by: FAMILY MEDICINE

## 2020-12-13 PROCEDURE — 82272 OCCULT BLD FECES 1-3 TESTS: CPT

## 2020-12-13 PROCEDURE — 99232 SBSQ HOSP IP/OBS MODERATE 35: CPT | Mod: ,,, | Performed by: INTERNAL MEDICINE

## 2020-12-13 PROCEDURE — 63600175 PHARM REV CODE 636 W HCPCS: Performed by: NURSE PRACTITIONER

## 2020-12-13 PROCEDURE — P9612 CATHETERIZE FOR URINE SPEC: HCPCS

## 2020-12-13 PROCEDURE — 83690 ASSAY OF LIPASE: CPT

## 2020-12-13 PROCEDURE — 87205 SMEAR GRAM STAIN: CPT

## 2020-12-13 PROCEDURE — 25000003 PHARM REV CODE 250: Performed by: NURSE PRACTITIONER

## 2020-12-13 PROCEDURE — 25000003 PHARM REV CODE 250: Performed by: FAMILY MEDICINE

## 2020-12-13 PROCEDURE — 80053 COMPREHEN METABOLIC PANEL: CPT

## 2020-12-13 PROCEDURE — 93005 ELECTROCARDIOGRAM TRACING: CPT

## 2020-12-13 PROCEDURE — 84484 ASSAY OF TROPONIN QUANT: CPT | Mod: 91

## 2020-12-13 PROCEDURE — 84145 PROCALCITONIN (PCT): CPT

## 2020-12-13 PROCEDURE — U0002 COVID-19 LAB TEST NON-CDC: HCPCS

## 2020-12-13 PROCEDURE — 25000003 PHARM REV CODE 250

## 2020-12-13 PROCEDURE — 83605 ASSAY OF LACTIC ACID: CPT

## 2020-12-13 PROCEDURE — 99233 SBSQ HOSP IP/OBS HIGH 50: CPT | Mod: ,,, | Performed by: SURGERY

## 2020-12-13 PROCEDURE — P9016 RBC LEUKOCYTES REDUCED: HCPCS

## 2020-12-13 PROCEDURE — 99900035 HC TECH TIME PER 15 MIN (STAT)

## 2020-12-13 PROCEDURE — 81000 URINALYSIS NONAUTO W/SCOPE: CPT

## 2020-12-13 PROCEDURE — 86901 BLOOD TYPING SEROLOGIC RH(D): CPT

## 2020-12-13 PROCEDURE — 27000221 HC OXYGEN, UP TO 24 HOURS

## 2020-12-13 PROCEDURE — 96374 THER/PROPH/DIAG INJ IV PUSH: CPT

## 2020-12-13 PROCEDURE — C9113 INJ PANTOPRAZOLE SODIUM, VIA: HCPCS | Performed by: EMERGENCY MEDICINE

## 2020-12-13 PROCEDURE — 21400001 HC TELEMETRY ROOM

## 2020-12-13 PROCEDURE — 94761 N-INVAS EAR/PLS OXIMETRY MLT: CPT

## 2020-12-13 PROCEDURE — 99291 CRITICAL CARE FIRST HOUR: CPT | Mod: 25

## 2020-12-13 PROCEDURE — 87077 CULTURE AEROBIC IDENTIFY: CPT

## 2020-12-13 PROCEDURE — 87186 SC STD MICRODIL/AGAR DIL: CPT

## 2020-12-13 PROCEDURE — 86920 COMPATIBILITY TEST SPIN: CPT

## 2020-12-13 PROCEDURE — 36415 COLL VENOUS BLD VENIPUNCTURE: CPT

## 2020-12-13 PROCEDURE — 85025 COMPLETE CBC W/AUTO DIFF WBC: CPT

## 2020-12-13 PROCEDURE — 99233 PR SUBSEQUENT HOSPITAL CARE,LEVL III: ICD-10-PCS | Mod: ,,, | Performed by: SURGERY

## 2020-12-13 PROCEDURE — 25000003 PHARM REV CODE 250: Performed by: EMERGENCY MEDICINE

## 2020-12-13 PROCEDURE — 87070 CULTURE OTHR SPECIMN AEROBIC: CPT

## 2020-12-13 PROCEDURE — 84484 ASSAY OF TROPONIN QUANT: CPT

## 2020-12-13 PROCEDURE — 82977 ASSAY OF GGT: CPT

## 2020-12-13 PROCEDURE — 96375 TX/PRO/DX INJ NEW DRUG ADDON: CPT

## 2020-12-13 PROCEDURE — 87040 BLOOD CULTURE FOR BACTERIA: CPT

## 2020-12-13 PROCEDURE — 85014 HEMATOCRIT: CPT

## 2020-12-13 PROCEDURE — 63600175 PHARM REV CODE 636 W HCPCS: Performed by: EMERGENCY MEDICINE

## 2020-12-13 PROCEDURE — 83735 ASSAY OF MAGNESIUM: CPT

## 2020-12-13 RX ORDER — ONDANSETRON 2 MG/ML
4 INJECTION INTRAMUSCULAR; INTRAVENOUS EVERY 4 HOURS PRN
Status: DISCONTINUED | OUTPATIENT
Start: 2020-12-13 | End: 2020-12-22 | Stop reason: HOSPADM

## 2020-12-13 RX ORDER — SODIUM CHLORIDE 0.9 % (FLUSH) 0.9 %
10 SYRINGE (ML) INJECTION
Status: DISCONTINUED | OUTPATIENT
Start: 2020-12-13 | End: 2020-12-22 | Stop reason: HOSPADM

## 2020-12-13 RX ORDER — CEFEPIME HYDROCHLORIDE 1 G/50ML
2 INJECTION, SOLUTION INTRAVENOUS
Status: DISCONTINUED | OUTPATIENT
Start: 2020-12-13 | End: 2020-12-13 | Stop reason: ALTCHOICE

## 2020-12-13 RX ORDER — METRONIDAZOLE 500 MG/100ML
500 INJECTION, SOLUTION INTRAVENOUS
Status: DISCONTINUED | OUTPATIENT
Start: 2020-12-13 | End: 2020-12-13 | Stop reason: ALTCHOICE

## 2020-12-13 RX ORDER — METOPROLOL TARTRATE 1 MG/ML
5 INJECTION, SOLUTION INTRAVENOUS
Status: COMPLETED | OUTPATIENT
Start: 2020-12-13 | End: 2020-12-13

## 2020-12-13 RX ORDER — HYDROCODONE BITARTRATE AND ACETAMINOPHEN 500; 5 MG/1; MG/1
TABLET ORAL
Status: DISCONTINUED | OUTPATIENT
Start: 2020-12-13 | End: 2020-12-22 | Stop reason: HOSPADM

## 2020-12-13 RX ORDER — ENALAPRILAT 1.25 MG/ML
1.25 INJECTION INTRAVENOUS EVERY 6 HOURS
Status: DISCONTINUED | OUTPATIENT
Start: 2020-12-13 | End: 2020-12-15

## 2020-12-13 RX ORDER — MEROPENEM AND SODIUM CHLORIDE 500 MG/50ML
500 INJECTION, SOLUTION INTRAVENOUS
Status: DISCONTINUED | OUTPATIENT
Start: 2020-12-13 | End: 2020-12-14 | Stop reason: ALTCHOICE

## 2020-12-13 RX ORDER — CEFEPIME HYDROCHLORIDE 1 G/50ML
2 INJECTION, SOLUTION INTRAVENOUS
Status: DISCONTINUED | OUTPATIENT
Start: 2020-12-13 | End: 2020-12-13

## 2020-12-13 RX ORDER — PHENOBARBITAL SODIUM 65 MG/ML
100 INJECTION, SOLUTION INTRAMUSCULAR; INTRAVENOUS NIGHTLY
Status: DISCONTINUED | OUTPATIENT
Start: 2020-12-13 | End: 2020-12-22 | Stop reason: HOSPADM

## 2020-12-13 RX ORDER — MUPIROCIN 20 MG/G
OINTMENT TOPICAL 2 TIMES DAILY
Status: COMPLETED | OUTPATIENT
Start: 2020-12-13 | End: 2020-12-18

## 2020-12-13 RX ORDER — METOPROLOL TARTRATE 1 MG/ML
INJECTION, SOLUTION INTRAVENOUS
Status: COMPLETED
Start: 2020-12-13 | End: 2020-12-13

## 2020-12-13 RX ORDER — LINEZOLID 2 MG/ML
600 INJECTION, SOLUTION INTRAVENOUS
Status: DISCONTINUED | OUTPATIENT
Start: 2020-12-13 | End: 2020-12-14

## 2020-12-13 RX ORDER — METOPROLOL TARTRATE 1 MG/ML
5 INJECTION, SOLUTION INTRAVENOUS ONCE
Status: COMPLETED | OUTPATIENT
Start: 2020-12-13 | End: 2020-12-13

## 2020-12-13 RX ORDER — SODIUM CHLORIDE, SODIUM LACTATE, POTASSIUM CHLORIDE, CALCIUM CHLORIDE 600; 310; 30; 20 MG/100ML; MG/100ML; MG/100ML; MG/100ML
INJECTION, SOLUTION INTRAVENOUS CONTINUOUS
Status: DISCONTINUED | OUTPATIENT
Start: 2020-12-13 | End: 2020-12-14

## 2020-12-13 RX ORDER — PANTOPRAZOLE SODIUM 40 MG/10ML
40 INJECTION, POWDER, LYOPHILIZED, FOR SOLUTION INTRAVENOUS
Status: COMPLETED | OUTPATIENT
Start: 2020-12-13 | End: 2020-12-13

## 2020-12-13 RX ORDER — METRONIDAZOLE 500 MG/100ML
500 INJECTION, SOLUTION INTRAVENOUS
Status: DISCONTINUED | OUTPATIENT
Start: 2020-12-13 | End: 2020-12-13

## 2020-12-13 RX ADMIN — LINEZOLID 600 MG: 600 INJECTION, SOLUTION INTRAVENOUS at 05:12

## 2020-12-13 RX ADMIN — MEROPENEM AND SODIUM CHLORIDE 500 MG: 500 INJECTION, SOLUTION INTRAVENOUS at 05:12

## 2020-12-13 RX ADMIN — MEROPENEM AND SODIUM CHLORIDE 500 MG: 500 INJECTION, SOLUTION INTRAVENOUS at 11:12

## 2020-12-13 RX ADMIN — SODIUM CHLORIDE, SODIUM LACTATE, POTASSIUM CHLORIDE, AND CALCIUM CHLORIDE 1143 ML: .6; .31; .03; .02 INJECTION, SOLUTION INTRAVENOUS at 01:12

## 2020-12-13 RX ADMIN — PHENOBARBITAL SODIUM 100.1 MG: 65 INJECTION INTRAMUSCULAR; INTRAVENOUS at 08:12

## 2020-12-13 RX ADMIN — METOROPROLOL TARTRATE 5 MG: 5 INJECTION, SOLUTION INTRAVENOUS at 03:12

## 2020-12-13 RX ADMIN — MICONAZOLE NITRATE: 2 OINTMENT TOPICAL at 08:12

## 2020-12-13 RX ADMIN — SODIUM CHLORIDE, SODIUM LACTATE, POTASSIUM CHLORIDE, AND CALCIUM CHLORIDE: .6; .31; .03; .02 INJECTION, SOLUTION INTRAVENOUS at 06:12

## 2020-12-13 RX ADMIN — METOPROLOL TARTRATE 5 MG: 1 INJECTION, SOLUTION INTRAVENOUS at 03:12

## 2020-12-13 RX ADMIN — ENALAPRILAT 1.25 MG: 1.25 INJECTION INTRAVENOUS at 06:12

## 2020-12-13 RX ADMIN — METOROPROLOL TARTRATE 5 MG: 5 INJECTION, SOLUTION INTRAVENOUS at 10:12

## 2020-12-13 RX ADMIN — MUPIROCIN: 20 OINTMENT TOPICAL at 08:12

## 2020-12-13 RX ADMIN — PANTOPRAZOLE SODIUM 40 MG: 40 INJECTION, POWDER, FOR SOLUTION INTRAVENOUS at 12:12

## 2020-12-13 RX ADMIN — ENALAPRILAT 1.25 MG: 1.25 INJECTION INTRAVENOUS at 11:12

## 2020-12-13 NOTE — ED NOTES
Attempted to collect blood cultures - unsuccessful x 2. Contacted lab to collect blood cultures at this time.

## 2020-12-13 NOTE — SUBJECTIVE & OBJECTIVE
No current facility-administered medications on file prior to encounter.      Current Outpatient Medications on File Prior to Encounter   Medication Sig    acetaminophen (TYLENOL) 325 MG tablet Take 325 mg by mouth every 4 (four) hours as needed for Pain.    arginine-glutamine-calcium HMB (CHEO) 7-7-1.5 gram PwPk Take by mouth 2 (two) times a day.    ascorbic acid, vitamin C, (VITAMIN C) 500 MG tablet 1 tablet (500 mg total) by Per G Tube route once daily.    baclofen (LIORESAL) 10 MG tablet 1 tablet (10 mg total) by Per G Tube route 3 (three) times daily.    bisacodyL (DULCOLAX) 10 mg Supp Place 1 suppository (10 mg total) rectally daily as needed (Until bowel movement if patient has no bowel movement for 2 days).    diphenhydrAMINE (BENADRYL) 12.5 mg/5 mL elixir 10 mLs (25 mg total) by Per G Tube route 4 (four) times daily as needed for Allergies.    gabapentin (NEURONTIN) 250 mg/5 mL solution 5 mLs (250 mg total) by Per G Tube route nightly. At bedtime    lactobacillus acidophilus & bulgar (LACTINEX) 100 million cell packet Take 1 tablet by mouth once daily.    linezolid (ZYVOX) 100 mg/5 mL SusR 30 mLs (600 mg total) by Per G Tube route every 12 (twelve) hours. for 5 days    melatonin (MELATIN) 3 mg tablet 2 tablets (6 mg total) by Per G Tube route nightly as needed for Insomnia.    miconazole nitrate 2% (MICOTIN) 2 % Oint Apply topically 2 (two) times daily. for 14 days    multivitamin liquid no.118 Liqd 10 mLs by Per G Tube route once daily.    PHENobarbitaL 20 mg/5 mL (4 mg/mL) Elix elixir 25 mLs (100 mg total) by Per G Tube route every evening.    potassium chloride (KLOR-CON) 20 mEq Pack DISSOLVE 2 TABLETS AS DIRECTED AND DRINK DAILY    propranoloL (INDERAL) 20 MG tablet 1 tablet (20 mg total) by Per G Tube route 3 (three) times daily. (Patient taking differently: 20 mg by Per G Tube route 4 (four) times daily. )       Review of patient's allergies indicates:   Allergen Reactions    Morphine  sulfate Hives       Past Medical History:   Diagnosis Date    Acid reflux     Anemia of chronic disease 8/30/2020    Cerebral palsy     Colitis     Decubitus ulcer of ischial area, left, stage IV 9/10/2020    History of hip surgery     Osteomyelitis of pelvis 9/24/2020    Pneumoperitoneum 9/21/2020    Pressure injury of right ischium, stage 4     S/P percutaneous endoscopic gastrostomy (PEG) tube placement     Seizure disorder 8/26/2020    Seizures     Severe protein-calorie malnutrition     Sinus tachycardia 8/4/2020     Past Surgical History:   Procedure Laterality Date    CHOLECYSTECTOMY      FLEXIBLE SIGMOIDOSCOPY N/A 9/9/2020    Procedure: SIGMOIDOSCOPY, FLEXIBLE;  Surgeon: America Goode MD;  Location: [facility] ([address] [place] [address]);  Service: Endoscopy;  Laterality: N/A;    HIP SURGERY      REVISION COLOSTOMY N/A 12/8/2020    Procedure: REVISION, COLOSTOMY;  Surgeon: Michael Ortez MD;  Location: [facility];  Service: General;  Laterality: N/A;  with partial colectomy    TRACHEOSTOMY N/A 10/27/2020    Procedure: CREATION, TRACHEOSTOMY;  Surgeon: Kar Palma MD;  Location: [facility];  Service: ENT;  Laterality: N/A;     Family History     Problem Relation (Age of Onset)    Cancer Maternal Grandfather        Tobacco Use    Smoking status: Never Smoker    Smokeless tobacco: Never Used   Substance and Sexual Activity    Alcohol use: Never     Frequency: Never    Drug use: Not Currently    Sexual activity: Never     Review of Systems   Unable to perform ROS: Patient nonverbal     Objective:     Vital Signs (Most Recent):  Temp: 97.4 °F (36.3 °C) (12/13/20 1630)  Pulse: (!) 119 (12/13/20 1630)  Resp: 20 (12/13/20 1630)  BP: (!) 148/107 (12/13/20 1630)  SpO2: 98 % (12/13/20 1630) Vital Signs (24h Range):  Temp:  [97.4 °F (36.3 °C)-99.4 °F (37.4 °C)] 97.4 °F (36.3 °C)  Pulse:  [110-140] 119  Resp:  [20-32] 20  SpO2:  [98 %-100 %] 98 %  BP: (137-148)/() 148/107     Weight: 38.1 kg (83 lb 14.4  oz)  Body mass index is 20.23 kg/m².    Physical Exam  Vitals signs reviewed.   Constitutional:       Appearance: He is well-developed.      Comments: Chronically ill   HENT:      Mouth/Throat:      Comments: Trach dependent  Neck:      Musculoskeletal: Neck supple.   Cardiovascular:      Rate and Rhythm: Normal rate and regular rhythm.   Pulmonary:      Effort: Pulmonary effort is normal.   Abdominal:      General: There is no distension.      Palpations: Abdomen is soft.      Tenderness: There is no abdominal tenderness.      Comments: PEG in place  Midline incision healing, no signs of infection  Ostomy viable with stool in appliance  No evidence of bleeding at skin edges   Musculoskeletal:      Comments: Contracted chronically   Skin:     General: Skin is warm.   Neurological:      Mental Status: Mental status is at baseline.         Significant Labs:  CBC:   Recent Labs   Lab 12/13/20  1216   WBC 16.90*   RBC 3.13*   HGB 8.6*   HCT 28.9*   *   MCV 92   MCH 27.5   MCHC 29.8*     BMP:   Recent Labs   Lab 12/13/20  1216   *      K 3.9      CO2 26   BUN 46*   CREATININE 0.7   CALCIUM 7.8*   MG 2.2       Significant Diagnostics:  I have reviewed and interpreted all pertinent imaging results/findings within the past 24 hours.     Results for orders placed during the hospital encounter of 12/13/20   US Abdomen Limited    Narrative EXAMINATION:  US ABDOMEN LIMITED    CLINICAL HISTORY:  Elevated liver enzymes.Abnormal alk phos, lipase;    COMPARISON:  None    FINDINGS:  The liver is grossly normal.  Gallbladder is absent consistent with prior cholecystectomy.  The common bile duct is mildly prominent at 6.5 mm.    The portal vein is grossly normal.  The pancreas is normal.  The right kidney is normal at 10 cm.    Trace bilateral pleural effusion.  Small volume ascites.  The aorta and IVC are normal.  The adrenals are not included.      Impression History of cholecystectomy.    Mildly prominent  common bile duct at 6.5 mm.    Trace pleural effusions.  Minimal perisplenic ascites.      Electronically signed by: Gigi Alicea MD  Date:    12/13/2020  Time:    15:58

## 2020-12-13 NOTE — ED NOTES
Pt resting in bed, chest rising and falling, side rails up x 2, call bell within reach. NAD at this time. Will continue to monitor.

## 2020-12-13 NOTE — CONSULTS
[facility] -   General Surgery  Consult Note    Patient Name: Glen Moscoso  MRN: [id number]  Code Status: Prior  Admission Date: 12/13/2020  Hospital Length of Stay: 0 days  Attending Physician: Layo Beck DO  Primary Care Provider: Yadi Lawson MD    Patient information was obtained from patient, parent and ER records.     Inpatient consult to General Surgery  Consult performed by: Jacquelyn Rao MD  Consult ordered by: ANNIKA Suarez        Subjective:     Principal Problem: bloody stool    History of Present Illness: 24 yo M with CP POD 5 s/p colostomy revision - sigmoid colectomy with end colostomy to resect prolapse and redundant colon. Presented to ED with melanotic stool and anemia. No visible bleeding at ostomy edges identified in the ED. PEG aspirated and no bloody output appreciated. Surgery was consulted for evaluation.     No current facility-administered medications on file prior to encounter.      Current Outpatient Medications on File Prior to Encounter   Medication Sig    acetaminophen (TYLENOL) 325 MG tablet Take 325 mg by mouth every 4 (four) hours as needed for Pain.    arginine-glutamine-calcium HMB (CHEO) 7-7-1.5 gram PwPk Take by mouth 2 (two) times a day.    ascorbic acid, vitamin C, (VITAMIN C) 500 MG tablet 1 tablet (500 mg total) by Per G Tube route once daily.    baclofen (LIORESAL) 10 MG tablet 1 tablet (10 mg total) by Per G Tube route 3 (three) times daily.    bisacodyL (DULCOLAX) 10 mg Supp Place 1 suppository (10 mg total) rectally daily as needed (Until bowel movement if patient has no bowel movement for 2 days).    diphenhydrAMINE (BENADRYL) 12.5 mg/5 mL elixir 10 mLs (25 mg total) by Per G Tube route 4 (four) times daily as needed for Allergies.    gabapentin (NEURONTIN) 250 mg/5 mL solution 5 mLs (250 mg total) by Per G Tube route nightly. At bedtime    lactobacillus acidophilus & bulgar (LACTINEX) 100 million cell packet Take 1 tablet  by mouth once daily.    linezolid (ZYVOX) 100 mg/5 mL SusR 30 mLs (600 mg total) by Per G Tube route every 12 (twelve) hours. for 5 days    melatonin (MELATIN) 3 mg tablet 2 tablets (6 mg total) by Per G Tube route nightly as needed for Insomnia.    miconazole nitrate 2% (MICOTIN) 2 % Oint Apply topically 2 (two) times daily. for 14 days    multivitamin liquid no.118 Liqd 10 mLs by Per G Tube route once daily.    PHENobarbitaL 20 mg/5 mL (4 mg/mL) Elix elixir 25 mLs (100 mg total) by Per G Tube route every evening.    potassium chloride (KLOR-CON) 20 mEq Pack DISSOLVE 2 TABLETS AS DIRECTED AND DRINK DAILY    propranoloL (INDERAL) 20 MG tablet 1 tablet (20 mg total) by Per G Tube route 3 (three) times daily. (Patient taking differently: 20 mg by Per G Tube route 4 (four) times daily. )       Review of patient's allergies indicates:   Allergen Reactions    Morphine sulfate Hives       Past Medical History:   Diagnosis Date    Acid reflux     Anemia of chronic disease 8/30/2020    Cerebral palsy     Colitis     Decubitus ulcer of ischial area, left, stage IV 9/10/2020    History of hip surgery     Osteomyelitis of pelvis 9/24/2020    Pneumoperitoneum 9/21/2020    Pressure injury of right ischium, stage 4     S/P percutaneous endoscopic gastrostomy (PEG) tube placement     Seizure disorder 8/26/2020    Seizures     Severe protein-calorie malnutrition     Sinus tachycardia 8/4/2020     Past Surgical History:   Procedure Laterality Date    CHOLECYSTECTOMY      FLEXIBLE SIGMOIDOSCOPY N/A 9/9/2020    Procedure: SIGMOIDOSCOPY, FLEXIBLE;  Surgeon: America Goode MD;  Location: [address]);  Service: Endoscopy;  Laterality: N/A;    HIP SURGERY      REVISION COLOSTOMY N/A 12/8/2020    Procedure: REVISION, COLOSTOMY;  Surgeon: Michael Ortez MD;  Location: [facility];  Service: General;  Laterality: N/A;  with partial colectomy    TRACHEOSTOMY N/A 10/27/2020    Procedure: CREATION,  TRACHEOSTOMY;  Surgeon: Kar Palma MD;  Location: [facility];  Service: ENT;  Laterality: N/A;     Family History     Problem Relation (Age of Onset)    Cancer Maternal Grandfather        Tobacco Use    Smoking status: Never Smoker    Smokeless tobacco: Never Used   Substance and Sexual Activity    Alcohol use: Never     Frequency: Never    Drug use: Not Currently    Sexual activity: Never     Review of Systems   Unable to perform ROS: Patient nonverbal     Objective:     Vital Signs (Most Recent):  Temp: 97.4 °F (36.3 °C) (12/13/20 1630)  Pulse: (!) 119 (12/13/20 1630)  Resp: 20 (12/13/20 1630)  BP: (!) 148/107 (12/13/20 1630)  SpO2: 98 % (12/13/20 1630) Vital Signs (24h Range):  Temp:  [97.4 °F (36.3 °C)-99.4 °F (37.4 °C)] 97.4 °F (36.3 °C)  Pulse:  [110-140] 119  Resp:  [20-32] 20  SpO2:  [98 %-100 %] 98 %  BP: (137-148)/() 148/107     Weight: 38.1 kg (83 lb 14.4 oz)  Body mass index is 20.23 kg/m².    Physical Exam  Vitals signs reviewed.   Constitutional:       Appearance: He is well-developed.      Comments: Chronically ill   HENT:      Mouth/Throat:      Comments: Trach dependent  Neck:      Musculoskeletal: Neck supple.   Cardiovascular:      Rate and Rhythm: Normal rate and regular rhythm.   Pulmonary:      Effort: Pulmonary effort is normal.   Abdominal:      General: There is no distension.      Palpations: Abdomen is soft.      Tenderness: There is no abdominal tenderness.      Comments: PEG in place  Midline incision healing, no signs of infection  Ostomy viable with stool in appliance  No evidence of bleeding at skin edges   Musculoskeletal:      Comments: Contracted chronically   Skin:     General: Skin is warm.   Neurological:      Mental Status: Mental status is at baseline.         Significant Labs:  CBC:   Recent Labs   Lab 12/13/20  1216   WBC 16.90*   RBC 3.13*   HGB 8.6*   HCT 28.9*   *   MCV 92   MCH 27.5   MCHC 29.8*     BMP:   Recent Labs   Lab 12/13/20  1216   *   NA  141   K 3.9      CO2 26   BUN 46*   CREATININE 0.7   CALCIUM 7.8*   MG 2.2       Significant Diagnostics:  I have reviewed and interpreted all pertinent imaging results/findings within the past 24 hours.     Results for orders placed during the hospital encounter of 12/13/20   US Abdomen Limited    Narrative EXAMINATION:  US ABDOMEN LIMITED    CLINICAL HISTORY:  Elevated liver enzymes.Abnormal alk phos, lipase;    COMPARISON:  None    FINDINGS:  The liver is grossly normal.  Gallbladder is absent consistent with prior cholecystectomy.  The common bile duct is mildly prominent at 6.5 mm.    The portal vein is grossly normal.  The pancreas is normal.  The right kidney is normal at 10 cm.    Trace bilateral pleural effusion.  Small volume ascites.  The aorta and IVC are normal.  The adrenals are not included.      Impression History of cholecystectomy.    Mildly prominent common bile duct at 6.5 mm.    Trace pleural effusions.  Minimal perisplenic ascites.      Electronically signed by: Gigi Alicea MD  Date:    12/13/2020  Time:    15:58         Assessment/Plan:     Gastrointestinal hemorrhage  Discussed case with GI. No evidence of bleeding from surgical source. Likely colon or SB in origin as PEG aspirate clear. Ideally, would wait 6 weeks for any endoscopy through fresh stoma.     Anemia management per medicine    Status post colostomy  5 days post revision by Dr. Ortez. Ostomy patent and no identified source at skin level to account for GI bleed. Melanotic stool suggests upstream source. GI consulted.     VTE Risk Mitigation (From admission, onward)    None          Thank you for your consult. I will follow-up with patient. Please contact us if you have any additional questions.    Jacquelyn Rao MD  General Surgery  [facility] -

## 2020-12-13 NOTE — ASSESSMENT & PLAN NOTE
ABL secondary to GI bleed  Positive stool for OCB in ER  GI and Surgery consulted  Monitor HGB and HCT q 6 hours  Patient to recived 1 unit PRBCs now and will have Blood bank T&CM 4 units PRBCs and have on hold to be available if needed

## 2020-12-13 NOTE — ED NOTES
Monitor rhythm shows Vtach, . Pt awake, chest rising and falling. NAD. EKG performed. Dr. Herrera at bedside.

## 2020-12-13 NOTE — HPI
26 yo M with CP POD 5 s/p colostomy revision - sigmoid colectomy with end colostomy to resect prolapse and redundant colon. Presented to ED with melanotic stool and anemia. No visible bleeding at ostomy edges identified in the ED. PEG aspirated and no bloody output appreciated. Surgery was consulted for evaluation.

## 2020-12-13 NOTE — PROGRESS NOTES
Patient with trach 8.0 shiley cuffed. Trach care done changed IC and dressing. Site cleaned with sterile water and peroxide. Patient was suctioned and tolerated everything well. Drainage that is thick brown, green coming from trach. Dressing changed and skin dried. Patient's nurse made aware.

## 2020-12-13 NOTE — ASSESSMENT & PLAN NOTE
Monitor  Telemetry  Patient received Iv bblockade in ER- Patient with acute GI bleed- Add Iv vasotec for now and monitor heart rate  Monitor

## 2020-12-13 NOTE — ED NOTES
"Per ambulance service, pt sent to ED from [facility] for increased secretions from tracheostomy and possible GI bleed due coffee ground emesis. [facility] did not call report to our facility PTA. Paper work states pt has a diagnosis of "colostomy".     Patient moved to ED room 13, placed onto stretcher. Patient placed on cardiac monitor, continuous pulse oximetry and automatic blood pressure cuff. Bed placed in low locked position, side rails up x 2, alarms set and turned on for monitor and pulse ox, awaiting MD evaluation and orders, will continue to monitor.    Patient identifies self as Glen Moscoso    LOC: The patient is awake, nonverbal, hx of cerebal palsy.  APPEARANCE: Patient resting in bed, no acute distress, patient is unclean and unkept.  SKIN: The skin is warm and dry, color consistent with ethnicity, patient has normal skin turgor and moist mucus membranes, tunneling wounds to left and right ischial tuberosity - wound vac sponges in place PTA however wound vac was not sent with patient from [facility], blanchable redness to sacrum, nonblanchable redness to scrotum and perineal area, yeast noted in popliteal folds, 20 G to KATHERYN and 20 G to L FA in place PTA - chart notes indicates lines were place on 12/6/20.  MUSCULOSKELETAL: Patient is bed bound, nonambulatory, all extremities contracted.  RESPIRATORY: Airway is open and patent, respirations are spontaneous, patient has an increased rate, no accessory muscle use noted, tracheostomy in place PTA - dark green/brownish drainage noted.  CARDIAC: Patient has an increased rate, normal rhythm, no peripheral edema noted, severe scrotal swelling present, capillary refill < 3 seconds.  ABDOMEN: Soft and non tender to palpation, distention noted, PEG tube to LUQ, colostomy to LLQ - recent revision on 12/8/20 per chart notes, colostomy bag has a small hole and is leaking.  NEUROLOGIC: Pt is nonverbal, does not follow commands.              "

## 2020-12-13 NOTE — ASSESSMENT & PLAN NOTE
Discussed case with GI. No evidence of bleeding from surgical source. Likely colon or SB in origin as PEG aspirate clear. Ideally, would wait 6 weeks for any endoscopy through fresh stoma.     Anemia management per medicine

## 2020-12-13 NOTE — ED PROVIDER NOTES
SCRIBE #1 NOTE: I, Tali Rick, am scribing for, and in the presence of, Anahi Herrera MD. I have scribed the entire note.      History      Chief Complaint   Patient presents with    Drainage from Incision     increase drainage from trach per [facility] as well as cofee ground emesis. no report received from [place].        Review of patient's allergies indicates:   Allergen Reactions    Morphine sulfate Hives        HPI   HPI    12/13/2020, 11:55 AM   History obtained from the [facility] and nursing attendant   HPI Limited due to: Patient nonverbal/unresponsive      History of Present Illness: Glen Moscoso is a 25 y.o. male patient with a PMHx of Cerebral palsy, colitis, stage IV Left hip decubitus ulcer, pneumoperitoneum, seizures, s/p PEG tube placement who presents to the Emergency Department with increased drainage from tracheostomy site. [facility] coffee ground em[facility]. Patient underwent a colostomy revision following colostomy prolapse x5 days ago by Dr. Ortez. No further complaints or concerns at this time.       Arrival mode: Ambulance Service    PCP: Yadi Lawson MD       Past Medical History:  Past Medical History:   Diagnosis Date    Acid reflux     Anemia of chronic disease 8/30/2020    Cerebral palsy     Colitis     Decubitus ulcer of ischial area, left, stage IV 9/10/2020    History of hip surgery     Osteomyelitis of pelvis 9/24/2020    Pneumoperitoneum 9/21/2020    Pressure injury of right ischium, stage 4     S/P percutaneous endoscopic gastrostomy (PEG) tube placement     Seizure disorder 8/26/2020    Seizures     Severe protein-calorie malnutrition     Sinus tachycardia 8/4/2020       Past Surgical History:  Past Surgical History:   Procedure Laterality Date    CHOLECYSTECTOMY      FLEXIBLE SIGMOIDOSCOPY N/A 9/9/2020    Procedure: SIGMOIDOSCOPY, FLEXIBLE;  Surgeon: America Goode MD;  Location:  [facility] (2ND FLR);  Service: Endoscopy;  Laterality: N/A;    HIP SURGERY      REVISION COLOSTOMY N/A 12/8/2020    Procedure: REVISION, COLOSTOMY;  Surgeon: Michael Ortez MD;  Location: [facility] OR;  Service: General;  Laterality: N/A;  with partial colectomy    TRACHEOSTOMY N/A 10/27/2020    Procedure: CREATION, TRACHEOSTOMY;  Surgeon: Kar Palma MD;  Location: [facility] OR;  Service: ENT;  Laterality: N/A;         Family History:  Family History   Problem Relation Age of Onset    Cancer Maternal Grandfather        Social History:  Social History     Tobacco Use    Smoking status: Never Smoker    Smokeless tobacco: Never Used   Substance and Sexual Activity    Alcohol use: Never     Frequency: Never    Drug use: Not Currently    Sexual activity: Never       ROS   Review of Systems   Unable to perform ROS: Other (Nonverbal/Unresponsive)     Physical Exam      Initial Vitals   BP Pulse Resp Temp SpO2   12/13/20 1144 12/13/20 1144 12/13/20 1144 12/13/20 1217 12/13/20 1144   (!) 137/97 (!) 140 (!) 26 99.4 °F (37.4 °C) 99 %      MAP       --                 Physical Exam  Nursing Notes and Vital Signs Reviewed.  Constitutional: Patient is unresponsive and nonverbal.  Head: Atraumatic. Normocephalic.  Eyes: EOM intact. Conjunctivae are  pale. No scleral icterus.  ENT: Mucous membranes are dry.  Neck: Tracheostomy tube in place.  Cardiovascular: Tachycardic rate. No murmurs, rubs, or gallops. Distal pulses are 2+ and symmetric. Central line noted to the right upper chest wall.  Pulmonary/Chest: No respiratory distress. Dimished bilaterally. No wheezing or rales.  Abdominal: Soft and non-distended. PEG tube in place. Colostomy bag in place with large amount of copious melanotic stool. There is no obvious no tenderness.  No rebound, guarding, or rigidity.   Genitourinary: Iglesias catheter in place.  Musculoskeletal: Contractures noted to upper and lower extremities with dependent edema.   Skin: Pale. Warm and  "dry.  Neurological:  Unresponsive to verbal and pain stimuli.  Non-verbal.  Appears encephalopathic. Known Cerebral Palsy.      ED Course    Critical Care    Date/Time: 12/13/2020 3:39 PM  Performed by: Anahi Herrera MD  Authorized by: Anahi Herrera MD   Direct patient critical care time: 25 minutes  Additional history critical care time: 5 minutes  Ordering / reviewing critical care time: 10 minutes  Documentation critical care time: 5 minutes  Consulting other physicians critical care time: 10 minutes  Consult with family critical care time: 5 minutes  Other critical care time: 0 minutes  Total critical care time (exclusive of procedural time) : 60 minutes  Critical care time was exclusive of separately billable procedures and treating other patients and teaching time.  Critical care was necessary to treat or prevent imminent or life-threatening deterioration of the following conditions: Sepsis, GI bleed, Encephalopathy.  Critical care was time spent personally by me on the following activities: blood draw for specimens, development of treatment plan with patient or surrogate, discussions with consultants, interpretation of cardiac output measurements, evaluation of patient's response to treatment, examination of patient, obtaining history from patient or surrogate, ordering and performing treatments and interventions, ordering and review of laboratory studies, ordering and review of radiographic studies, pulse oximetry, re-evaluation of patient's condition and review of old charts.        ED Vital Signs:  Vitals:    12/13/20 1144 12/13/20 1207 12/13/20 1217 12/13/20 1228   BP: (!) 137/97      Pulse: (!) 140   (!) 134   Resp: (!) 26      Temp:   99.4 °F (37.4 °C)    TempSrc:   Rectal    SpO2: 99%      Weight:  38.1 kg (83 lb 14.4 oz)     Height: 4' 6" (1.372 m)       12/13/20 1230 12/13/20 1307 12/13/20 1330 12/13/20 1430   BP: (!) 144/103  (!) 147/110 (!) 148/112   Pulse: (!) 136  (!) 137 (!) 130   Resp: " (!) 28  (!) 26 (!) 28   Temp:       TempSrc:       SpO2: 100% 100% 100% 100%   Weight:       Height:        12/13/20 1500   BP: (!) 146/113   Pulse: (!) 137   Resp: (!) 30   Temp:    TempSrc:    SpO2: 100%   Weight:    Height:        Abnormal Lab Results:  Labs Reviewed   CBC W/ AUTO DIFFERENTIAL - Abnormal; Notable for the following components:       Result Value    WBC 16.90 (*)     RBC 3.13 (*)     Hemoglobin 8.6 (*)     Hematocrit 28.9 (*)     MCHC 29.8 (*)     RDW 18.4 (*)     Platelets 426 (*)     Immature Granulocytes 0.6 (*)     Gran # (ANC) 13.8 (*)     Immature Grans (Abs) 0.10 (*)     Mono # 1.6 (*)     Gran % 81.6 (*)     Lymph % 8.3 (*)     All other components within normal limits   COMPREHENSIVE METABOLIC PANEL - Abnormal; Notable for the following components:    Glucose 141 (*)     BUN 46 (*)     Calcium 7.8 (*)     Total Protein 5.9 (*)     Albumin 1.4 (*)     Alkaline Phosphatase 1,622 (*)     AST 77 (*)     Anion Gap 7 (*)     All other components within normal limits   URINALYSIS, REFLEX TO URINE CULTURE - Abnormal; Notable for the following components:    Appearance, UA Cloudy (*)     Protein, UA 2+ (*)     Occult Blood UA 2+ (*)     Leukocytes, UA 3+ (*)     All other components within normal limits    Narrative:     Specimen Source->Urine   PROTIME-INR - Abnormal; Notable for the following components:    Prothrombin Time 12.6 (*)     All other components within normal limits   LIPASE - Abnormal; Notable for the following components:    Lipase 258 (*)     All other components within normal limits   PROCALCITONIN - Abnormal; Notable for the following components:    Procalcitonin 1.57 (*)     All other components within normal limits   OCCULT BLOOD X 1, STOOL - Abnormal; Notable for the following components:    Occult Blood Positive (*)     All other components within normal limits   URINALYSIS MICROSCOPIC - Abnormal; Notable for the following components:    Bacteria Few (*)     Granular Casts,  UA 10 (*)     All other components within normal limits    Narrative:     Specimen Source->Urine   CULTURE, BLOOD   CULTURE, BLOOD   LACTIC ACID, PLASMA   MAGNESIUM   APTT   TROPONIN I   SARS-COV-2 RNA AMPLIFICATION, QUAL    Narrative:     Possible hosp   GAMMA GT   LACTIC ACID, PLASMA   GAMMA GT   TYPE & SCREEN   PREPARE RBC SOFT        All Lab Results:  Results for orders placed or performed during the hospital encounter of 12/13/20   CBC auto differential   Result Value Ref Range    WBC 16.90 (H) 3.90 - 12.70 K/uL    RBC 3.13 (L) 4.60 - 6.20 M/uL    Hemoglobin 8.6 (L) 14.0 - 18.0 g/dL    Hematocrit 28.9 (L) 40.0 - 54.0 %    MCV 92 82 - 98 fL    MCH 27.5 27.0 - 31.0 pg    MCHC 29.8 (L) 32.0 - 36.0 g/dL    RDW 18.4 (H) 11.5 - 14.5 %    Platelets 426 (H) 150 - 350 K/uL    MPV 9.7 9.2 - 12.9 fL    Immature Granulocytes 0.6 (H) 0.0 - 0.5 %    Gran # (ANC) 13.8 (H) 1.8 - 7.7 K/uL    Immature Grans (Abs) 0.10 (H) 0.00 - 0.04 K/uL    Lymph # 1.4 1.0 - 4.8 K/uL    Mono # 1.6 (H) 0.3 - 1.0 K/uL    Eos # 0.0 0.0 - 0.5 K/uL    Baso # 0.01 0.00 - 0.20 K/uL    nRBC 0 0 /100 WBC    Gran % 81.6 (H) 38.0 - 73.0 %    Lymph % 8.3 (L) 18.0 - 48.0 %    Mono % 9.3 4.0 - 15.0 %    Eosinophil % 0.1 0.0 - 8.0 %    Basophil % 0.1 0.0 - 1.9 %    Differential Method Automated    Comprehensive metabolic panel   Result Value Ref Range    Sodium 141 136 - 145 mmol/L    Potassium 3.9 3.5 - 5.1 mmol/L    Chloride 108 95 - 110 mmol/L    CO2 26 23 - 29 mmol/L    Glucose 141 (H) 70 - 110 mg/dL    BUN 46 (H) 6 - 20 mg/dL    Creatinine 0.7 0.5 - 1.4 mg/dL    Calcium 7.8 (L) 8.7 - 10.5 mg/dL    Total Protein 5.9 (L) 6.0 - 8.4 g/dL    Albumin 1.4 (L) 3.5 - 5.2 g/dL    Total Bilirubin 0.5 0.1 - 1.0 mg/dL    Alkaline Phosphatase 1,622 (H) 55 - 135 U/L    AST 77 (H) 10 - 40 U/L    ALT 34 10 - 44 U/L    Anion Gap 7 (L) 8 - 16 mmol/L    eGFR if African American >60 >60 mL/min/1.73 m^2    eGFR if non African American >60 >60 mL/min/1.73 m^2   Lactic acid,  plasma #1   Result Value Ref Range    Lactate (Lactic Acid) 1.5 0.5 - 2.2 mmol/L   Urinalysis, Reflex to Urine Culture Urine, Catheterized    Specimen: Urine   Result Value Ref Range    Specimen UA Urine, Catheterized     Color, UA Yellow Yellow, Straw, Vane    Appearance, UA Cloudy (A) Clear    pH, UA 6.0 5.0 - 8.0    Specific Gravity, UA 1.020 1.005 - 1.030    Protein, UA 2+ (A) Negative    Glucose, UA Negative Negative    Ketones, UA Negative Negative    Bilirubin (UA) Negative Negative    Occult Blood UA 2+ (A) Negative    Nitrite, UA Negative Negative    Urobilinogen, UA Negative <2.0 EU/dL    Leukocytes, UA 3+ (A) Negative   Magnesium   Result Value Ref Range    Magnesium 2.2 1.6 - 2.6 mg/dL   Protime-INR   Result Value Ref Range    Prothrombin Time 12.6 (H) 9.0 - 12.5 sec    INR 1.2 0.8 - 1.2   APTT   Result Value Ref Range    aPTT <21.0 21.0 - 32.0 sec   Troponin I   Result Value Ref Range    Troponin I <0.006 0.000 - 0.026 ng/mL   Lipase   Result Value Ref Range    Lipase 258 (H) 4 - 60 U/L   Procalcitonin   Result Value Ref Range    Procalcitonin 1.57 (H) <0.25 ng/mL   Occult blood x 1, stool    Specimen: Stool   Result Value Ref Range    Occult Blood Positive (A) Negative   COVID-19 Rapid Screening   Result Value Ref Range    SARS-CoV-2 RNA, Amplification, Qual Negative Negative   Urinalysis Microscopic   Result Value Ref Range    RBC, UA 3 0 - 4 /hpf    WBC, UA 3 0 - 5 /hpf    Bacteria Few (A) None-Occ /hpf    Squam Epithel, UA 5 /hpf    Hyaline Casts, UA 0 0-1/lpf /lpf    Granular Casts, UA 10 (A) None /lpf    Uric Acid Rosy, UA Moderate None-Moderate    Microscopic Comment SEE COMMENT    Type & Screen   Result Value Ref Range    Group & Rh A NEG     Indirect Tao NEG    Prepare RBC 1 Unit   Result Value Ref Range    UNIT NUMBER [id number]     Product Code K4124G66     DISPENSE STATUS CROSSMATCHED     CODING SYSTEM [id number]     Unit Blood Type Code 0600     Unit Blood Type A NEG     Unit Expiration  [id number]          Imaging Results:  Imaging Results          X-Ray Chest AP Portable (Final result)  Result time 12/13/20 13:02:47    Final result by Gigi Alicea MD (12/13/20 13:02:47)                 Impression:      No acute findings.      Electronically signed by: Gigi Alicea MD  Date:    12/13/2020  Time:    13:02             Narrative:    EXAMINATION:  XR CHEST AP PORTABLE    CLINICAL HISTORY:  Respiratory distress., Sepsis;    COMPARISON:  12/06/2020.    FINDINGS:  A tracheotomy tube is present.  Right chest wall PICC line is present.    The heart size is normal.    The lung volumes are reduced.  No definite infiltrate seen at this time.                                    The EKG was ordered, reviewed, and independently interpreted by the ED provider.  Interpretation time: 11:48  Rate: 148 BPM  Rhythm: sinus tachycardia  Interpretation: Nonspecific ST and T wave abnormality. No STEMI.    The EKG was ordered, reviewed, and independently interpreted by the ED provider.  Interpretation time: 14:59  Rate: 140 BPM  Rhythm: Sinus tachycardia with frequent premature ventricular complexes  Interpretation: ST elevation, consider inferior injury or acute infarct. Elevated ST Segments in anterior leads, this is demand ischemia.     The EKG was ordered, reviewed, and independently interpreted by the ED provider.  Interpretation time: 15:09  Rate: 109 BPM  Rhythm: sinus tachycardia  Interpretation: ST elevation, consider anterior injury or acute infarct. No STEMI.    The Emergency Provider reviewed the vital signs and test results, which are outlined above.    ED Discussion     2:20 PM: Discussed pt's case with Dr. Bautista (Gastroenterology) who states that she does not believe this is a GI issue recommends further discussion with General Surgery considering patient's recent colostomy revision.     2:34 PM: Discussed pt's case with Dr. Rao (General Surgery) who recommends admission to hospital  medicine and will further discuss pt's case with GI.    3:22 PM: Discussed case with KIESHA West ([facility]). Dr. Beck agrees with current care and management of pt and accepts admission.   Admitting Service: [facility]  Admitting Physician: Dr. Beck  Admit to: Inpatient tele    3:23 PM: Re-evaluated pt. I have discussed test results, shared treatment plan, and the need for admission with mother at bedside. She confirms that patient is a full code. Discussed the severity of patient's illness and high likelihood of poor outcome with mother. It is unclear if mother truly understands the severity of patient's clinical conditions at this time. All questions answered. Mother has no further questions or concerns at this time. Pt is ready for admit.           ED Medication(s):  Medications   lactated ringers bolus 1,143 mL (1,143 mLs Intravenous New Bag 12/13/20 1340)   0.9%  NaCl infusion (for blood administration) (has no administration in time range)   pantoprazole injection 40 mg (40 mg Intravenous Given 12/13/20 1255)   metoprolol injection 5 mg (5 mg Intravenous Given 12/13/20 1504)   metoprolol injection 5 mg (5 mg Intravenous Given by Other 12/13/20 1510)             Medical Decision Making    Medical Decision Making:   Clinical Tests:   Lab Tests: Ordered and Reviewed  Radiological Study: Ordered and Reviewed  Medical Tests: Ordered and Reviewed           Scribe Attestation:   Scribe #1: I performed the above scribed service and the documentation accurately describes the services I performed. I attest to the accuracy of the note.    Attending:   Physician Attestation Statement for Scribe #1: I, Anahi Herrera MD, personally performed the services described in this documentation, as scribed by Tali Cabrera, in my presence, and it is both accurate and complete.          Clinical Impression       ICD-10-CM ICD-9-CM   1. Gastrointestinal hemorrhage, unspecified gastrointestinal  hemorrhage type  K92.2 578.9   2. Tachycardia  R00.0 785.0   3. Acute on chronic anemia  D64.9 285.9   4. SIRS (systemic inflammatory response syndrome)  R65.10 995.90   5. Demand ischemia  I24.8 411.89   6. Abnormal LFTs  R94.5 790.6   7. Severe flexion contractures of all joints  M24.50 718.49   8. Encephalopathy  G93.40 348.30   9. Cerebral palsy, unspecified type  G80.9 343.9   10. Nonverbal  R47.01 784.3   11. Chronic indwelling Iglesias catheter  Z97.8 V45.89   12. Melanotic stools  K92.1 578.1       Disposition:   Disposition: Admitted  Condition: Serious         Anahi Herrera MD  12/13/20 2370

## 2020-12-13 NOTE — ASSESSMENT & PLAN NOTE
This patient does have evidence of infective focus  My overall impression is sepsis. Vital signs were reviewed and noted in progress note.  Antibiotics given-   Antibiotics (From admission, onward)    Start     Stop Route Frequency Ordered    12/13/20 2100  mupirocin 2 % ointment      12/18 2059 Nasl 2 times daily 12/13/20 1649 12/13/20 1800  meropenem-0.9% sodium chloride 500 mg/50 mL IVPB      -- IV Every 6 hours (non-standard times) 12/13/20 1649 12/13/20 1800  linezolid 600 mg/300 mL IVPB 600 mg      -- IV Every 12 hours (non-standard times) 12/13/20 1649        Cultures were taken-   Microbiology Results (last 7 days)     Procedure Component Value Units Date/Time    Culture, Respiratory with Gram Stain [[id number]]     Order Status: No result Specimen: Respiratory from Sputum     Blood culture x two cultures. Draw prior to antibiotics. [[id number]] Collected: 12/13/20 1536    Order Status: Sent Specimen: Blood from Peripheral, Antecubital, Left Updated: 12/13/20 1600    Blood culture x two cultures. Draw prior to antibiotics. [[id number]] Collected: 12/13/20 1530    Order Status: Sent Specimen: Blood from Peripheral, Antecubital, Left Updated: 12/13/20 1559        Latest lactate reviewed, they are-  Recent Labs   Lab 12/13/20  1223   LACTATE 1.5     Source- Suspected GI    Source control Achieved by- Iv Abx  ID, GI,  and General Surgery consulted

## 2020-12-13 NOTE — ASSESSMENT & PLAN NOTE
S/p recent Colostomy placement on 10/29/2020 followed by Revision due to Colostomy prolapse on 12/08/2020

## 2020-12-13 NOTE — H&P
[facility] Hospital Medicine  History & Physical    Patient Name: Glen Moscoso  MRN: [id number]  Admission Date: 12/13/2020  Attending Physician: Layo Beck DO   Primary Care Provider: Yadi Lawson MD    Patient information was obtained from Mother, Leni Moscoso, at bedside and ER records.     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 no report received from [facility].         HPI: Per ER report- This is a 26 yo male with PMHx of cerebal palsy, nonverbal, chronic resp failure, trach dependent, chronic anemia, peg tube placement, and prior colostomy placement for fecal diversion due to 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 Ortez on 12/08/2020 due to colostomy prolapse) due to bilateral sacral decubitus ulcers. He is a resident of [facility] where he was sent from today for reports of tachycardia, coffee ground emesis,  and dark/black stool noted coming from his colostomy. His stool for occult blood is positive and he has had a noted two  point drop in his hgb and hct  from 3 days ago. His BP has been stable.  Patient is being placed in hospital for further evaluation and treatment including GI and Surgery consult, Iv Abx for sepsis, and close monitoring of his GI bleed.     Patient seen in ER.    I had a long discussion with Mother, Leni Moscoso, [phone], at bedside and updated her on patient's status. Patient is extremely debilitated with multiple comorbidities. Mother is the decision  "maker and states patient is a Full Code. I explained to her patient's guarded prognosis and she  "wants everything done".      Past Medical History:   Diagnosis Date    Acid reflux     Anemia of chronic disease 8/30/2020    Cerebral palsy     Colitis     Decubitus ulcer of ischial area, left, stage IV 9/10/2020    History of hip surgery     Osteomyelitis of pelvis 9/24/2020    Pneumoperitoneum 9/21/2020    Pressure injury of right ischium, stage 4     S/P percutaneous endoscopic gastrostomy (PEG) tube placement     Seizure disorder 8/26/2020    Seizures     Severe protein-calorie malnutrition     Sinus tachycardia 8/4/2020       Past Surgical History:   Procedure Laterality Date    CHOLECYSTECTOMY      FLEXIBLE SIGMOIDOSCOPY N/A 9/9/2020    Procedure: SIGMOIDOSCOPY, FLEXIBLE;  Surgeon: America Goode MD;  Location: [address]);  Service: Endoscopy;  Laterality: N/A;    HIP SURGERY      REVISION COLOSTOMY N/A 12/8/2020    Procedure: REVISION, COLOSTOMY;  Surgeon: Michael Ortez MD;  Location: [facility];  Service: General;  Laterality: N/A;  with partial colectomy    TRACHEOSTOMY N/A 10/27/2020    Procedure: CREATION, TRACHEOSTOMY;  Surgeon: Kar Palma MD;  Location: [facility] OR;  Service: ENT;  Laterality: N/A;       Review of patient's allergies indicates:   Allergen Reactions    Morphine sulfate Hives       No current facility-administered medications on file prior to encounter.      Current Outpatient Medications on File Prior to Encounter   Medication Sig    acetaminophen (TYLENOL) 325 MG tablet Take 325 mg by mouth every 4 (four) hours as needed for Pain.    arginine-glutamine-calcium HMB (CHEO) 7-7-1.5 gram PwPk Take by mouth 2 (two) times a day.    ascorbic acid, vitamin C, (VITAMIN C) 500 MG tablet 1 tablet (500 mg total) by Per G Tube route once daily.    baclofen (LIORESAL) 10 MG tablet 1 tablet (10 mg total) by Per G Tube route 3 (three) times daily.    bisacodyL " (DULCOLAX) 10 mg Supp Place 1 suppository (10 mg total) rectally daily as needed (Until bowel movement if patient has no bowel movement for 2 days).    diphenhydrAMINE (BENADRYL) 12.5 mg/5 mL elixir 10 mLs (25 mg total) by Per G Tube route 4 (four) times daily as needed for Allergies.    gabapentin (NEURONTIN) 250 mg/5 mL solution 5 mLs (250 mg total) by Per G Tube route nightly. At bedtime    lactobacillus acidophilus & bulgar (LACTINEX) 100 million cell packet Take 1 tablet by mouth once daily.    linezolid (ZYVOX) 100 mg/5 mL SusR 30 mLs (600 mg total) by Per G Tube route every 12 (twelve) hours. for 5 days    melatonin (MELATIN) 3 mg tablet 2 tablets (6 mg total) by Per G Tube route nightly as needed for Insomnia.    miconazole nitrate 2% (MICOTIN) 2 % Oint Apply topically 2 (two) times daily. for 14 days    multivitamin liquid no.118 Liqd 10 mLs by Per G Tube route once daily.    PHENobarbitaL 20 mg/5 mL (4 mg/mL) Elix elixir 25 mLs (100 mg total) by Per G Tube route every evening.    potassium chloride (KLOR-CON) 20 mEq Pack DISSOLVE 2 TABLETS AS DIRECTED AND DRINK DAILY    propranoloL (INDERAL) 20 MG tablet 1 tablet (20 mg total) by Per G Tube route 3 (three) times daily. (Patient taking differently: 20 mg by Per G Tube route 4 (four) times daily. )     Family History     Problem Relation (Age of Onset)    Cancer Maternal Grandfather        Tobacco Use    Smoking status: Never Smoker    Smokeless tobacco: Never Used   Substance and Sexual Activity    Alcohol use: Never     Frequency: Never    Drug use: Not Currently    Sexual activity: Never     Review of Systems   Unable to perform ROS: Patient nonverbal   Constitutional:        Chronic debility   HENT:        Tracheostomy   Respiratory: Positive for cough.         ER reported  thick brown, green sputum from trache   Cardiovascular: Positive for leg swelling.   Gastrointestinal: Positive for vomiting.        Coffee ground emesis PTA    Dark  brown/ black stool in colostomy that was positive for OCB in ER    Peg tube in place   Genitourinary: Positive for scrotal swelling.        Iglesias catheter   Musculoskeletal: Positive for gait problem.        Bedbound   Skin: Positive for wound.        Chronic bilateral sacral wounds   Neurological: Positive for speech difficulty and weakness.        Nonverbal     Objective:     Vital Signs (Most Recent):  Temp: 99.4 °F (37.4 °C) (12/13/20 1217)  Pulse: (!) 114 (12/13/20 1601)  Resp: (!) 32 (12/13/20 1558)  BP: (!) 148/109 (12/13/20 1601)  SpO2: 100 % (12/13/20 1601) Vital Signs (24h Range):  Temp:  [99.4 °F (37.4 °C)] 99.4 °F (37.4 °C)  Pulse:  [110-140] 114  Resp:  [26-32] 32  SpO2:  [99 %-100 %] 100 %  BP: (137-148)/() 148/109     Weight: 38.1 kg (83 lb 14.4 oz)  Body mass index is 20.23 kg/m².    Physical Exam  Constitutional:       General: He is not in acute distress.     Appearance: He is ill-appearing. He is not diaphoretic.      Comments: Fragile, weak, pale   HENT:      Head: Atraumatic.      Mouth/Throat:      Mouth: Mucous membranes are moist.   Eyes:      General:         Right eye: No discharge.         Left eye: No discharge.   Neck:      Musculoskeletal: No neck rigidity or muscular tenderness.      Comments: Trache noted with trache collar   Cardiovascular:      Rate and Rhythm: Regular rhythm. Tachycardia present.      Heart sounds: Normal heart sounds.   Pulmonary:      Effort: No respiratory distress.      Comments: BBS diminished    Initially tachypneic but now improved  Abdominal:      General: There is distension.      Comments: Decreased bowel sounds  Abdomen distended    Peg tube noted clamped    Left colostomy noted with dark brown/ black stool noted   Genitourinary:     Comments: Iglesias catheter noted ana urine    Scrotal edema    Anasarca  Musculoskeletal:      Right lower leg: Edema present.      Left lower leg: Edema present.      Comments: Contracted BUE and BLE exetremities    3  Plus BLE edema with scrotal edema and anasarca   Skin:     General: Skin is warm and dry.      Capillary Refill: Capillary refill takes 2 to 3 seconds.      Coloration: Skin is pale.      Comments: Bilateral tunneling sacral decubitus  Blanchable redness to sacrum, nonblanchable redness to scrotum and perineal area, yeast noted in popliteal folds   Neurological:      Mental Status: He is alert.      Comments: Nonverbal  No response to simple commands noted             Lab Results   Component Value Date    WBC 16.90 (H) 12/13/2020    HGB 8.6 (L) 12/13/2020    HCT 28.9 (L) 12/13/2020    MCV 92 12/13/2020     (H) 12/13/2020         CMP  Sodium   Date Value Ref Range Status   12/13/2020 141 136 - 145 mmol/L Final     Potassium   Date Value Ref Range Status   12/13/2020 3.9 3.5 - 5.1 mmol/L Final     Chloride   Date Value Ref Range Status   12/13/2020 108 95 - 110 mmol/L Final     CO2   Date Value Ref Range Status   12/13/2020 26 23 - 29 mmol/L Final     Glucose   Date Value Ref Range Status   12/13/2020 141 (H) 70 - 110 mg/dL Final     BUN   Date Value Ref Range Status   12/13/2020 46 (H) 6 - 20 mg/dL Final     Creatinine   Date Value Ref Range Status   12/13/2020 0.7 0.5 - 1.4 mg/dL Final     Calcium   Date Value Ref Range Status   12/13/2020 7.8 (L) 8.7 - 10.5 mg/dL Final     Total Protein   Date Value Ref Range Status   12/13/2020 5.9 (L) 6.0 - 8.4 g/dL Final     Albumin   Date Value Ref Range Status   12/13/2020 1.4 (L) 3.5 - 5.2 g/dL Final     Total Bilirubin   Date Value Ref Range Status   12/13/2020 0.5 0.1 - 1.0 mg/dL Final     Comment:     For infants and newborns, interpretation of results should be based  on gestational age, weight and in agreement with clinical  observations.  Premature Infant recommended reference ranges:  Up to 24 hours.............<8.0 mg/dL  Up to 48 hours............<12.0 mg/dL  3-5 days..................<15.0 mg/dL  6-29 days.................<15.0 mg/dL       Alkaline  Phosphatase   Date Value Ref Range Status   12/13/2020 1,622 (H) 55 - 135 U/L Final     AST   Date Value Ref Range Status   12/13/2020 77 (H) 10 - 40 U/L Final     ALT   Date Value Ref Range Status   12/13/2020 34 10 - 44 U/L Final     Anion Gap   Date Value Ref Range Status   12/13/2020 7 (L) 8 - 16 mmol/L Final     eGFR if    Date Value Ref Range Status   12/13/2020 >60 >60 mL/min/1.73 m^2 Final     eGFR if non    Date Value Ref Range Status   12/13/2020 >60 >60 mL/min/1.73 m^2 Final     Comment:     Calculation used to obtain the estimated glomerular filtration  rate (eGFR) is the CKD-EPI equation.            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 Vital signs were reviewed and noted in progress note.  Antibiotics given-   Antibiotics (From admission, onward)    Start     Stop Route Frequency Ordered    12/13/20 2100  mupirocin 2 % ointment      12/18 2059 Nasl 2 times daily 12/13/20 1649    12/13/20 1800  meropenem-0.9% sodium chloride 500 mg/50 mL IVPB      -- IV Every 6 hours (non-standard times) 12/13/20 1649    12/13/20 1800  linezolid 600 mg/300 mL IVPB 600 mg      -- IV Every 12 hours (non-standard times) 12/13/20 1649        Cultures were taken-   Microbiology Results (last 7 days)     Procedure Component Value Units Date/Time    Culture, Respiratory with Gram Stain [[id number]]     Order Status: No result Specimen: Respiratory from Sputum     Blood culture x two cultures. Draw prior to antibiotics. [[id number]]  Collected: 12/13/20 1536    Order Status: Sent Specimen: Blood from Peripheral, Antecubital, Left Updated: 12/13/20 1600    Blood culture x two cultures. Draw prior to antibiotics. [[id number]] Collected: 12/13/20 1530    Order Status: Sent Specimen: Blood from Peripheral, Antecubital, Left Updated: 12/13/20 1559        Latest lactate reviewed, they are-  Recent Labs   Lab 12/13/20  1223   LACTATE 1.5     Source- Suspected GI    Source control Achieved by- Iv Abx  ID, GI,  and General Surgery consulted        Hypertensive urgency  Monitor  Telemetry  Patient received Iv bblockade in ER- Patient with acute GI bleed- Add Iv vasotec for now and monitor heart rate  Monitor        Gastrointestinal hemorrhage  ABL secondary to GI bleed  Positive stool for OCB in ER  GI and Surgery consulted  Monitor HGB and HCT q 6 hours  Patient to recived 1 unit PRBCs now and will have Blood bank T&CM 4 units PRBCs and have on hold to be available if needed      Severe malnutrition  Consult dietician      Status post colostomy  S/p recent Colostomy placement on 10/29/2020 followed by Revision due to Colostomy prolapse on 12/08/2020      Seizure disorder  Patient currently NPO- Will give home dose phenobarbital Iv- Discussed with pharmacy        VTE Risk Mitigation (From admission, onward)         Ordered     IP VTE HIGH RISK PATIENT  Once      12/13/20 1726     Place SHANELLE hose  Until discontinued      12/13/20 1726     Place SHANELLE hose  Until discontinued      12/13/20 1658                 Time spent seeing patient( greater than 1/2 spent in direct contact) : 98 minutes      ANNIKA Baker  Department of Hospital Medicine   [facility]

## 2020-12-13 NOTE — ASSESSMENT & PLAN NOTE
5 days post revision by Dr. Ortez. Ostomy patent and no identified source at skin level to account for GI bleed. Melanotic stool suggests upstream source. GI consulted.

## 2020-12-13 NOTE — ED NOTES
Wound vac sponges removed from L/R ischial tuberosity. Wet-to-dry dressing applied to site. Both 20 G IV's removed. Colostomy bag replaced. Pt cleaned. Side rails up x 2, bed in lowest position, will continue to monitor.

## 2020-12-13 NOTE — HPI
"Per ER report- This is a 26 yo male with PMHx of cerebal palsy, nonverbal, chronic resp failure, trach dependent, chronic anemia, peg tube placement, and prior colostomy placement for fecal diversion due to 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 Ortez on 12/08/2020 due to colostomy prolapse) due to bilateral sacral decubitus ulcers. He is a resident of [facility] where he was sent from today for reports of tachycardia, coffee ground emesis,  and dark/black stool noted coming from his colostomy. His stool for occult blood is positive and he has had a noted two  point drop in his hgb and hct  from 3 days ago. His BP has been stable.  Patient is being placed in hospital for further evaluation and treatment including GI and Surgery consult, Iv Abx for sepsis, and close monitoring of his GI bleed.     Patient seen in ER.    I had a long discussion with Mother, Leni Mocsoso, [phone], at bedside and updated her on patient's status. Patient is extremely debilitated with multiple comorbidities. Mother is the decision maker and states patient is a Full Code. I explained to her patient's guarded prognosis and she  "wants everything done".    "

## 2020-12-14 ENCOUNTER — TELEPHONE (OUTPATIENT)
Dept: ENDOSCOPY | Facility: HOSPITAL | Age: 25
End: 2020-12-14

## 2020-12-14 PROBLEM — K92.1 MELENA: Status: ACTIVE | Noted: 2020-12-14

## 2020-12-14 PROBLEM — R74.8 ELEVATED ALKALINE PHOSPHATASE LEVEL: Status: ACTIVE | Noted: 2020-12-14

## 2020-12-14 PROBLEM — Z51.5 ENCOUNTER FOR PALLIATIVE CARE: Status: ACTIVE | Noted: 2020-12-14

## 2020-12-14 PROBLEM — B37.49 CANDIDURIA: Status: ACTIVE | Noted: 2020-12-14

## 2020-12-14 LAB
ALBUMIN SERPL BCP-MCNC: 1.3 G/DL (ref 3.5–5.2)
ALBUMIN SERPL BCP-MCNC: 1.4 G/DL (ref 3.5–5.2)
ALP SERPL-CCNC: 1874 U/L (ref 55–135)
ALP SERPL-CCNC: 2100 U/L (ref 55–135)
ALT SERPL W/O P-5'-P-CCNC: 33 U/L (ref 10–44)
ALT SERPL W/O P-5'-P-CCNC: 46 U/L (ref 10–44)
ANION GAP SERPL CALC-SCNC: 12 MMOL/L (ref 8–16)
ANION GAP SERPL CALC-SCNC: 8 MMOL/L (ref 8–16)
AST SERPL-CCNC: 68 U/L (ref 10–40)
AST SERPL-CCNC: 82 U/L (ref 10–40)
BASOPHILS # BLD AUTO: 0.01 K/UL (ref 0–0.2)
BASOPHILS # BLD AUTO: 0.02 K/UL (ref 0–0.2)
BASOPHILS NFR BLD: 0.1 % (ref 0–1.9)
BASOPHILS NFR BLD: 0.1 % (ref 0–1.9)
BILIRUB SERPL-MCNC: 0.3 MG/DL (ref 0.1–1)
BILIRUB SERPL-MCNC: 0.7 MG/DL (ref 0.1–1)
BUN SERPL-MCNC: 29 MG/DL (ref 6–20)
BUN SERPL-MCNC: 36 MG/DL (ref 6–20)
CALCIUM SERPL-MCNC: 7.6 MG/DL (ref 8.7–10.5)
CALCIUM SERPL-MCNC: 7.8 MG/DL (ref 8.7–10.5)
CHLORIDE SERPL-SCNC: 108 MMOL/L (ref 95–110)
CHLORIDE SERPL-SCNC: 109 MMOL/L (ref 95–110)
CO2 SERPL-SCNC: 23 MMOL/L (ref 23–29)
CO2 SERPL-SCNC: 24 MMOL/L (ref 23–29)
CREAT SERPL-MCNC: 0.6 MG/DL (ref 0.5–1.4)
CREAT SERPL-MCNC: 0.7 MG/DL (ref 0.5–1.4)
CRP SERPL-MCNC: 58.4 MG/L (ref 0–8.2)
DIFFERENTIAL METHOD: ABNORMAL
DIFFERENTIAL METHOD: ABNORMAL
EOSINOPHIL # BLD AUTO: 0.2 K/UL (ref 0–0.5)
EOSINOPHIL # BLD AUTO: 0.4 K/UL (ref 0–0.5)
EOSINOPHIL NFR BLD: 1.5 % (ref 0–8)
EOSINOPHIL NFR BLD: 2.2 % (ref 0–8)
ERYTHROCYTE [DISTWIDTH] IN BLOOD BY AUTOMATED COUNT: 16.9 % (ref 11.5–14.5)
ERYTHROCYTE [DISTWIDTH] IN BLOOD BY AUTOMATED COUNT: 17.1 % (ref 11.5–14.5)
ERYTHROCYTE [SEDIMENTATION RATE] IN BLOOD BY WESTERGREN METHOD: 46 MM/HR (ref 0–10)
EST. GFR  (AFRICAN AMERICAN): >60 ML/MIN/1.73 M^2
EST. GFR  (AFRICAN AMERICAN): >60 ML/MIN/1.73 M^2
EST. GFR  (NON AFRICAN AMERICAN): >60 ML/MIN/1.73 M^2
EST. GFR  (NON AFRICAN AMERICAN): >60 ML/MIN/1.73 M^2
GLUCOSE SERPL-MCNC: 174 MG/DL (ref 70–110)
GLUCOSE SERPL-MCNC: 83 MG/DL (ref 70–110)
HCT VFR BLD AUTO: 31.6 % (ref 40–54)
HCT VFR BLD AUTO: 31.9 % (ref 40–54)
HCT VFR BLD AUTO: 50.7 % (ref 40–54)
HCT VFR BLD AUTO: 50.7 % (ref 40–54)
HGB BLD-MCNC: 9.7 G/DL (ref 14–18)
HGB BLD-MCNC: 9.8 G/DL (ref 14–18)
IMM GRANULOCYTES # BLD AUTO: 0.07 K/UL (ref 0–0.04)
IMM GRANULOCYTES # BLD AUTO: 0.13 K/UL (ref 0–0.04)
IMM GRANULOCYTES NFR BLD AUTO: 0.4 % (ref 0–0.5)
IMM GRANULOCYTES NFR BLD AUTO: 0.9 % (ref 0–0.5)
LACTATE SERPL-SCNC: 1.1 MMOL/L (ref 0.5–2.2)
LIPASE SERPL-CCNC: 146 U/L (ref 4–60)
LYMPHOCYTES # BLD AUTO: 1.1 K/UL (ref 1–4.8)
LYMPHOCYTES # BLD AUTO: 1.4 K/UL (ref 1–4.8)
LYMPHOCYTES NFR BLD: 6.7 % (ref 18–48)
LYMPHOCYTES NFR BLD: 9.6 % (ref 18–48)
MAGNESIUM SERPL-MCNC: 2 MG/DL (ref 1.6–2.6)
MCH RBC QN AUTO: 28.2 PG (ref 27–31)
MCH RBC QN AUTO: 28.2 PG (ref 27–31)
MCHC RBC AUTO-ENTMCNC: 30.7 G/DL (ref 32–36)
MCHC RBC AUTO-ENTMCNC: 30.7 G/DL (ref 32–36)
MCV RBC AUTO: 92 FL (ref 82–98)
MCV RBC AUTO: 92 FL (ref 82–98)
MONOCYTES # BLD AUTO: 1.6 K/UL (ref 0.3–1)
MONOCYTES # BLD AUTO: 1.7 K/UL (ref 0.3–1)
MONOCYTES NFR BLD: 10.8 % (ref 4–15)
MONOCYTES NFR BLD: 10.8 % (ref 4–15)
NEUTROPHILS # BLD AUTO: 11.3 K/UL (ref 1.8–7.7)
NEUTROPHILS # BLD AUTO: 12.7 K/UL (ref 1.8–7.7)
NEUTROPHILS NFR BLD: 77.1 % (ref 38–73)
NEUTROPHILS NFR BLD: 79.8 % (ref 38–73)
NRBC BLD-RTO: 0 /100 WBC
NRBC BLD-RTO: 0 /100 WBC
PHOSPHATE SERPL-MCNC: 1.7 MG/DL (ref 2.7–4.5)
PLATELET # BLD AUTO: 362 K/UL (ref 150–350)
PLATELET # BLD AUTO: 451 K/UL (ref 150–350)
PMV BLD AUTO: 9.7 FL (ref 9.2–12.9)
PMV BLD AUTO: 9.8 FL (ref 9.2–12.9)
POTASSIUM SERPL-SCNC: 3.6 MMOL/L (ref 3.5–5.1)
POTASSIUM SERPL-SCNC: 3.7 MMOL/L (ref 3.5–5.1)
PREALB SERPL-MCNC: 20 MG/DL (ref 20–43)
PROT SERPL-MCNC: 5.4 G/DL (ref 6–8.4)
PROT SERPL-MCNC: 5.5 G/DL (ref 6–8.4)
RBC # BLD AUTO: 3.44 M/UL (ref 4.6–6.2)
RBC # BLD AUTO: 3.48 M/UL (ref 4.6–6.2)
SODIUM SERPL-SCNC: 141 MMOL/L (ref 136–145)
SODIUM SERPL-SCNC: 143 MMOL/L (ref 136–145)
TROPONIN I SERPL DL<=0.01 NG/ML-MCNC: <0.006 NG/ML (ref 0–0.03)
TROPONIN I SERPL DL<=0.01 NG/ML-MCNC: <0.006 NG/ML (ref 0–0.03)
WBC # BLD AUTO: 14.69 K/UL (ref 3.9–12.7)
WBC # BLD AUTO: 15.87 K/UL (ref 3.9–12.7)

## 2020-12-14 PROCEDURE — 25500020 PHARM REV CODE 255: Performed by: FAMILY MEDICINE

## 2020-12-14 PROCEDURE — 99233 PR SUBSEQUENT HOSPITAL CARE,LEVL III: ICD-10-PCS | Mod: ,,, | Performed by: INTERNAL MEDICINE

## 2020-12-14 PROCEDURE — 87070 CULTURE OTHR SPECIMN AEROBIC: CPT

## 2020-12-14 PROCEDURE — 99232 PR SUBSEQUENT HOSPITAL CARE,LEVL II: ICD-10-PCS | Mod: ,,, | Performed by: INTERNAL MEDICINE

## 2020-12-14 PROCEDURE — 63600175 PHARM REV CODE 636 W HCPCS: Performed by: NURSE PRACTITIONER

## 2020-12-14 PROCEDURE — 25500020 PHARM REV CODE 255: Performed by: INTERNAL MEDICINE

## 2020-12-14 PROCEDURE — 99232 PR SUBSEQUENT HOSPITAL CARE,LEVL II: ICD-10-PCS | Mod: ,,, | Performed by: SURGERY

## 2020-12-14 PROCEDURE — 99233 SBSQ HOSP IP/OBS HIGH 50: CPT | Mod: 25,,, | Performed by: INTERNAL MEDICINE

## 2020-12-14 PROCEDURE — 87186 SC STD MICRODIL/AGAR DIL: CPT | Mod: 59

## 2020-12-14 PROCEDURE — 25000003 PHARM REV CODE 250: Performed by: FAMILY MEDICINE

## 2020-12-14 PROCEDURE — 63600175 PHARM REV CODE 636 W HCPCS: Performed by: PHYSICIAN ASSISTANT

## 2020-12-14 PROCEDURE — 99232 SBSQ HOSP IP/OBS MODERATE 35: CPT | Mod: ,,, | Performed by: INTERNAL MEDICINE

## 2020-12-14 PROCEDURE — 99900026 HC AIRWAY MAINTENANCE (STAT)

## 2020-12-14 PROCEDURE — 99232 SBSQ HOSP IP/OBS MODERATE 35: CPT | Mod: ,,, | Performed by: SURGERY

## 2020-12-14 PROCEDURE — C9113 INJ PANTOPRAZOLE SODIUM, VIA: HCPCS | Performed by: PHYSICIAN ASSISTANT

## 2020-12-14 PROCEDURE — 93010 ELECTROCARDIOGRAM REPORT: CPT | Mod: ,,, | Performed by: INTERNAL MEDICINE

## 2020-12-14 PROCEDURE — 85651 RBC SED RATE NONAUTOMATED: CPT

## 2020-12-14 PROCEDURE — 84100 ASSAY OF PHOSPHORUS: CPT

## 2020-12-14 PROCEDURE — 87077 CULTURE AEROBIC IDENTIFY: CPT

## 2020-12-14 PROCEDURE — 86140 C-REACTIVE PROTEIN: CPT

## 2020-12-14 PROCEDURE — 36415 COLL VENOUS BLD VENIPUNCTURE: CPT

## 2020-12-14 PROCEDURE — 84484 ASSAY OF TROPONIN QUANT: CPT | Mod: 91

## 2020-12-14 PROCEDURE — 99223 1ST HOSP IP/OBS HIGH 75: CPT | Mod: ,,, | Performed by: PHYSICIAN ASSISTANT

## 2020-12-14 PROCEDURE — 84134 ASSAY OF PREALBUMIN: CPT

## 2020-12-14 PROCEDURE — 84075 ASSAY ALKALINE PHOSPHATASE: CPT

## 2020-12-14 PROCEDURE — 99233 PR SUBSEQUENT HOSPITAL CARE,LEVL III: ICD-10-PCS | Mod: ,,, | Performed by: FAMILY MEDICINE

## 2020-12-14 PROCEDURE — 99233 SBSQ HOSP IP/OBS HIGH 50: CPT | Mod: ,,, | Performed by: INTERNAL MEDICINE

## 2020-12-14 PROCEDURE — 83735 ASSAY OF MAGNESIUM: CPT

## 2020-12-14 PROCEDURE — 80053 COMPREHEN METABOLIC PANEL: CPT

## 2020-12-14 PROCEDURE — 94761 N-INVAS EAR/PLS OXIMETRY MLT: CPT

## 2020-12-14 PROCEDURE — 83605 ASSAY OF LACTIC ACID: CPT

## 2020-12-14 PROCEDURE — 25000003 PHARM REV CODE 250: Performed by: NURSE PRACTITIONER

## 2020-12-14 PROCEDURE — 99233 PR SUBSEQUENT HOSPITAL CARE,LEVL III: ICD-10-PCS | Mod: 25,,, | Performed by: INTERNAL MEDICINE

## 2020-12-14 PROCEDURE — 84080 ASSAY ALKALINE PHOSPHATASES: CPT

## 2020-12-14 PROCEDURE — 80053 COMPREHEN METABOLIC PANEL: CPT | Mod: 91

## 2020-12-14 PROCEDURE — 83690 ASSAY OF LIPASE: CPT

## 2020-12-14 PROCEDURE — 85025 COMPLETE CBC W/AUTO DIFF WBC: CPT | Mod: 91

## 2020-12-14 PROCEDURE — 93005 ELECTROCARDIOGRAM TRACING: CPT

## 2020-12-14 PROCEDURE — 63600175 PHARM REV CODE 636 W HCPCS: Performed by: FAMILY MEDICINE

## 2020-12-14 PROCEDURE — 21400001 HC TELEMETRY ROOM

## 2020-12-14 PROCEDURE — 93010 EKG 12-LEAD: ICD-10-PCS | Mod: ,,, | Performed by: INTERNAL MEDICINE

## 2020-12-14 PROCEDURE — 99900035 HC TECH TIME PER 15 MIN (STAT)

## 2020-12-14 PROCEDURE — 99233 SBSQ HOSP IP/OBS HIGH 50: CPT | Mod: ,,, | Performed by: FAMILY MEDICINE

## 2020-12-14 PROCEDURE — 99223 PR INITIAL HOSPITAL CARE,LEVL III: ICD-10-PCS | Mod: ,,, | Performed by: PHYSICIAN ASSISTANT

## 2020-12-14 PROCEDURE — 85014 HEMATOCRIT: CPT

## 2020-12-14 RX ORDER — FUROSEMIDE 10 MG/ML
20 INJECTION INTRAMUSCULAR; INTRAVENOUS ONCE
Status: COMPLETED | OUTPATIENT
Start: 2020-12-14 | End: 2020-12-14

## 2020-12-14 RX ORDER — PANTOPRAZOLE SODIUM 40 MG/10ML
40 INJECTION, POWDER, LYOPHILIZED, FOR SOLUTION INTRAVENOUS 2 TIMES DAILY
Status: DISCONTINUED | OUTPATIENT
Start: 2020-12-14 | End: 2020-12-19

## 2020-12-14 RX ORDER — DEXTROSE MONOHYDRATE AND SODIUM CHLORIDE 5; .9 G/100ML; G/100ML
INJECTION, SOLUTION INTRAVENOUS CONTINUOUS
Status: DISCONTINUED | OUTPATIENT
Start: 2020-12-14 | End: 2020-12-17

## 2020-12-14 RX ORDER — FLUCONAZOLE 40 MG/ML
200 POWDER, FOR SUSPENSION ORAL DAILY
Status: DISCONTINUED | OUTPATIENT
Start: 2020-12-14 | End: 2020-12-14

## 2020-12-14 RX ORDER — LEVOFLOXACIN 5 MG/ML
750 INJECTION, SOLUTION INTRAVENOUS
Status: DISCONTINUED | OUTPATIENT
Start: 2020-12-14 | End: 2020-12-19

## 2020-12-14 RX ADMIN — MICONAZOLE NITRATE: 2 OINTMENT TOPICAL at 10:12

## 2020-12-14 RX ADMIN — IOHEXOL 75 ML: 350 INJECTION, SOLUTION INTRAVENOUS at 05:12

## 2020-12-14 RX ADMIN — MUPIROCIN: 20 OINTMENT TOPICAL at 10:12

## 2020-12-14 RX ADMIN — IOHEXOL 50 ML: 350 INJECTION, SOLUTION INTRAVENOUS at 09:12

## 2020-12-14 RX ADMIN — ENALAPRILAT 1.25 MG: 1.25 INJECTION INTRAVENOUS at 05:12

## 2020-12-14 RX ADMIN — FLUCONAZOLE 200 MG: 40 POWDER, FOR SUSPENSION ORAL at 04:12

## 2020-12-14 RX ADMIN — PHENOBARBITAL SODIUM 100.1 MG: 65 INJECTION INTRAMUSCULAR; INTRAVENOUS at 10:12

## 2020-12-14 RX ADMIN — PANTOPRAZOLE SODIUM 40 MG: 40 INJECTION, POWDER, FOR SOLUTION INTRAVENOUS at 09:12

## 2020-12-14 RX ADMIN — FUROSEMIDE 20 MG: 10 INJECTION, SOLUTION INTRAMUSCULAR; INTRAVENOUS at 04:12

## 2020-12-14 RX ADMIN — DEXTROSE AND SODIUM CHLORIDE 50 ML/HR: 5; .9 INJECTION, SOLUTION INTRAVENOUS at 04:12

## 2020-12-14 RX ADMIN — IOHEXOL 200 ML: 350 INJECTION, SOLUTION INTRAVENOUS at 02:12

## 2020-12-14 RX ADMIN — MEROPENEM AND SODIUM CHLORIDE 500 MG: 500 INJECTION, SOLUTION INTRAVENOUS at 05:12

## 2020-12-14 RX ADMIN — IOHEXOL 75 ML: 350 INJECTION, SOLUTION INTRAVENOUS at 01:12

## 2020-12-14 RX ADMIN — LEVOFLOXACIN 750 MG: 750 INJECTION, SOLUTION INTRAVENOUS at 04:12

## 2020-12-14 RX ADMIN — MEROPENEM AND SODIUM CHLORIDE 500 MG: 500 INJECTION, SOLUTION INTRAVENOUS at 11:12

## 2020-12-14 RX ADMIN — SODIUM CHLORIDE 500 ML: 0.9 INJECTION, SOLUTION INTRAVENOUS at 01:12

## 2020-12-14 RX ADMIN — MUPIROCIN: 20 OINTMENT TOPICAL at 09:12

## 2020-12-14 RX ADMIN — PANTOPRAZOLE SODIUM 40 MG: 40 INJECTION, POWDER, FOR SOLUTION INTRAVENOUS at 10:12

## 2020-12-14 RX ADMIN — ENALAPRILAT 1.25 MG: 1.25 INJECTION INTRAVENOUS at 06:12

## 2020-12-14 RX ADMIN — ENALAPRILAT 1.25 MG: 1.25 INJECTION INTRAVENOUS at 11:12

## 2020-12-14 RX ADMIN — MICONAZOLE NITRATE: 2 OINTMENT TOPICAL at 09:12

## 2020-12-14 RX ADMIN — AMPICILLIN SODIUM AND SULBACTAM SODIUM 3 G: 2; 1 INJECTION, POWDER, FOR SOLUTION INTRAMUSCULAR; INTRAVENOUS at 04:12

## 2020-12-14 RX ADMIN — AMPICILLIN SODIUM AND SULBACTAM SODIUM 3 G: 2; 1 INJECTION, POWDER, FOR SOLUTION INTRAMUSCULAR; INTRAVENOUS at 10:12

## 2020-12-14 RX ADMIN — LINEZOLID 600 MG: 600 INJECTION, SOLUTION INTRAVENOUS at 05:12

## 2020-12-14 NOTE — CONSULTS
Advance Care Planning    Consult Note  Palliative Medicine      Consult Requested By: Layo Beck DO  Reason for Consult: Goals of care    SUBJECTIVE:     History of Present Illness:  Glen Moscoso is a 25 y.o. year old with a history of cerebral palsy, stage IV decubiti, seizure disorder, and recent diverting ostomy to promote wound healing presented to the emergency department due to coffee ground emesis and increased trach drainage. He had colostomy revision on 12/8/20 due to ostomy prolapse and returned to [facility] for wound care. He was readmitted due to lower than previous H/H and further evaluation. Soon after admission he developed tachycardia and EKG was worrisome for STEMI. Cardiology evaluated and felt EKG was more likely related to coronary spasm vs artifact and advised to continue medical management. GI is following and will consider EGD if cleared by general surgery but most concerned about the elevated alkaline phosphatase level. The workup is ongoing however in light of the frequent readmissions, continued infection, and poor health status, Palliative Medicine was consulted to assist with goals of care discussion. I met Mr. Moscoso with his mother Leni at bedside. Mr. Moscoso is nonverbal and not able to communicate her his mother. She says that she is tired from lack of sleep and worried about Glen. She        Past Medical History:   Diagnosis Date    Acid reflux     Anemia of chronic disease 8/30/2020    Cerebral palsy     Colitis     Decubitus ulcer of ischial area, left, stage IV 9/10/2020    History of hip surgery     Osteomyelitis of pelvis 9/24/2020    Pneumoperitoneum 9/21/2020    Pressure injury of right ischium, stage 4     S/P percutaneous endoscopic gastrostomy (PEG) tube placement     Seizure disorder 8/26/2020    Seizures     Severe protein-calorie malnutrition     Sinus tachycardia 8/4/2020     Past Surgical History:   Procedure Laterality Date    CHOLECYSTECTOMY       FLEXIBLE SIGMOIDOSCOPY N/A 9/9/2020    Procedure: SIGMOIDOSCOPY, FLEXIBLE;  Surgeon: America Goode MD;  Location: [facility] ([address] [place] [address]);  Service: Endoscopy;  Laterality: N/A;    HIP SURGERY      REVISION COLOSTOMY N/A 12/8/2020    Procedure: REVISION, COLOSTOMY;  Surgeon: Michael Ortez MD;  Location: [facility];  Service: General;  Laterality: N/A;  with partial colectomy    TRACHEOSTOMY N/A 10/27/2020    Procedure: CREATION, TRACHEOSTOMY;  Surgeon: Kar Palma MD;  Location: [facility];  Service: ENT;  Laterality: N/A;     Family History   Problem Relation Age of Onset    Cancer Maternal Grandfather      Social History     Socioeconomic History    Marital status: Single     Spouse name: Not on file    Number of children: Not on file    Years of education: Not on file    Highest education level: Not on file   Occupational History    Not on file   Social Needs    Financial resource strain: Not on file    Food insecurity     Worry: Not on file     Inability: Not on file    Transportation needs     Medical: Not on file     Non-medical: Not on file   Tobacco Use    Smoking status: Never Smoker    Smokeless tobacco: Never Used   Substance and Sexual Activity    Alcohol use: Never     Frequency: Never    Drug use: Not Currently    Sexual activity: Never   Lifestyle    Physical activity     Days per week: Not on file     Minutes per session: Not on file    Stress: Not on file   Relationships    Social connections     Talks on phone: Not on file     Gets together: Not on file     Attends [employer] service: Not on file     Active member of club or organization: Not on file     Attends meetings of clubs or organizations: Not on file     Relationship status: Not on file   Other Topics Concern    Not on file   Social History Narrative    Not on file      Review of patient's allergies indicates:   Allergen Reactions    Morphine sulfate Hives       Medications:    Current Facility-Administered  Medications:     0.9%  NaCl infusion (for blood administration), , Intravenous, Q24H PRN, Anahi Herrera MD    enalaprilat injection 1.25 mg, 1.25 mg, Intravenous, Q6H, ANNIKA Suarez, 1.25 mg at 12/14/20 0552    lactated ringers infusion, , Intravenous, Continuous, ANNIKA Suarez, Last Rate: 60 mL/hr at 12/13/20 1828    linezolid 600 mg/300 mL IVPB 600 mg, 600 mg, Intravenous, Q12H, LIZY SuarezP, Last Rate: 300 mL/hr at 12/14/20 0552, 600 mg at 12/14/20 0552    meropenem-0.9% sodium chloride 500 mg/50 mL IVPB, 500 mg, Intravenous, Q6H, LIZY SuarezP, Last Rate: 50 mL/hr at 12/14/20 0552, 500 mg at 12/14/20 0552    miconazole nitrate 2% ointment, , Topical (Top), BID, Elena Paredes, ANNIKA    mupirocin 2 % ointment, , Nasal, BID, Layo Beck DO    ondansetron injection 4 mg, 4 mg, Intravenous, Q4H PRN, ANNIKA Suarez    pantoprazole injection 40 mg, 40 mg, Intravenous, BID, Kyle Ferrell PA-C, 40 mg at 12/14/20 0957    phenobarbital injection 100.1 mg, 100.1 mg, Intravenous, QHS, Layo Beck DO, 100.1 mg at 12/13/20 2031    sodium chloride 0.9% flush 10 mL, 10 mL, Intravenous, PRN, LIZY SuarezP    ROS:  Review of Systems   Unable to perform ROS: Acuity of condition       OBJECTIVE:     Physical Exam:  Vitals: Temp: 98.4 °F (36.9 °C) (12/14/20 0700)  Pulse: (!) 140 (12/14/20 0838)  Resp: 20 (12/14/20 0838)  BP: (!) 130/90 (12/14/20 0700)  SpO2: 100 % (12/14/20 0838)    Gen: unresponsive, alert, contractures  Head: atraumatic, normocephalic  Eyes: conjuctiva and sclera clear  Ears: no external abnormality  Mouth: dry MM, poor dentition  Respiratory: CTAB, no wheezing or rhonchi  Heart: tachycardic  Abdomen: soft, nondistended, normoactive BS  Pulses: 2+ in DP  Extremities: no edema  Neurologic: cerebral palsy with baseline nonverbal and noncommunicative state  Skin: no rashes or lesions  Psych: nonverbal and not interactive    Review of  Symptoms              Performance Status:  10    Living Arrangements:  Lives with family    Advance Directives:   Living Will: No    LaPOST: No    Do Not Resuscitate Status: No    Medical Power of : No      Decision Making:  Family answered questions and Patient unable to communicate due to disease severity/cognitive impairment      Labs:  WBC   Date Value Ref Range Status   12/14/2020 15.87 (H) 3.90 - 12.70 K/uL Final     Hemoglobin   Date Value Ref Range Status   12/14/2020 9.8 (L) 14.0 - 18.0 g/dL Final     Hematocrit   Date Value Ref Range Status   12/14/2020 31.9 (L) 40.0 - 54.0 % Final     MCV   Date Value Ref Range Status   12/14/2020 92 82 - 98 fL Final     Platelets   Date Value Ref Range Status   12/14/2020 362 (H) 150 - 350 K/uL Final       Lab Results   Component Value Date     12/14/2020    K 3.6 12/14/2020     12/14/2020    CO2 23 12/14/2020    BUN 36 (H) 12/14/2020    CREATININE 0.7 12/14/2020    CALCIUM 7.8 (L) 12/14/2020    ANIONGAP 12 12/14/2020    ESTGFRAFRICA >60 12/14/2020    EGFRNONAA >60 12/14/2020       Lab Results   Component Value Date    AST 68 (H) 12/14/2020    GGT 2,261 (H) 12/13/2020    ALKPHOS 1,874 (H) 12/14/2020    BILITOT 0.7 12/14/2020       Albumin   Date Value Ref Range Status   12/14/2020 1.4 (L) 3.5 - 5.2 g/dL Final       Radiology:I have reviewed all pertinent imaging results/findings within the past 24 hours.  - Abd US 12/13/20:  History of cholecystectomy.     Mildly prominent common bile duct at 6.5 mm.     Trace pleural effusions.  Minimal perisplenic ascites.    - XR abd 12/13/20:  Abnormal gas pattern with gas density involving the central abdominal region.  Gastrostomy noted.  Recommend correlation to physical exam and follow-up.  Underlying perforation or other similar abnormal pathology not excluded.  Attention on follow-up    ASSESSMENT   Glen Moscoso is a 25 y.o. year old with a history of cerebral palsy, stage IV decubiti, seizure disorder, and  recent diverting ostomy to promote wound healing presented to the emergency department due to coffee ground emesis and increased trach drainage. He had colostomy revision on 12/8/20 due to ostomy prolapse and returned to [facility] for [facility] care. He was readmitted due to lower than previous H/H and further evaluation. Soon after admission he developed tachycardia and EKG was worrisome for STEMI. Cardiology evaluated and felt EKG was more likely related to coronary spasm vs artifact and advised to continue medical management. GI is following and will consider EGD if cleared by general surgery but most concerned about the elevated alkaline phosphatase level. The workup is ongoing however in light of the frequent readmissions, continued infection, and poor health status, Palliative Medicine was consulted to assist with goals of care discussion.     PLAN   1. Elevated alkaline phosphatase  - GI and surgery following  - CT scan pending report    2. Cerebral palsy with stage IV decubiti and contractures  - Mother attentive and aware of his baseline health but cannot discuss what the complications recently can mean for long term  - Recent revision of diverting colostomy on 12/8/20  - Baseline nonverbal and minimally responsive    3. Encounter for Palliative Care  - Code status: FULL  - Surrogate: mother Leni [phone]  - My meeting today was to introduce my role on the team, introduce topics such as thresholds to escalation, and offer support. Leni was not able to discuss the worst possible outcome or what the complications recently could mean for Glen. I will continue to follow along  - Leni is requesting assistance with meals while admitted and I have reached out to the CM       Discussed case and visit details with Dr. Beck.     Thank you for allowing Palliative Medicine to be involved in the care of Glen Moscoso.         Medical decision making: HIGH based on high risk of death, untreated symptoms, high risk  medications, poor prognosis, management of more than one chronic illness in exacerbation    Amanda Miranda PA-C  Palliative Medicine\

## 2020-12-14 NOTE — SUBJECTIVE & OBJECTIVE
Past Medical History:   Diagnosis Date    Acid reflux     Anemia of chronic disease 8/30/2020    Cerebral palsy     Colitis     Decubitus ulcer of ischial area, left, stage IV 9/10/2020    History of hip surgery     Osteomyelitis of pelvis 9/24/2020    Pneumoperitoneum 9/21/2020    Pressure injury of right ischium, stage 4     S/P percutaneous endoscopic gastrostomy (PEG) tube placement     Seizure disorder 8/26/2020    Seizures     Severe protein-calorie malnutrition     Sinus tachycardia 8/4/2020       Past Surgical History:   Procedure Laterality Date    CHOLECYSTECTOMY      FLEXIBLE SIGMOIDOSCOPY N/A 9/9/2020    Procedure: SIGMOIDOSCOPY, FLEXIBLE;  Surgeon: America Goode MD;  Location: [address]);  Service: Endoscopy;  Laterality: N/A;    HIP SURGERY      REVISION COLOSTOMY N/A 12/8/2020    Procedure: REVISION, COLOSTOMY;  Surgeon: Michael Ortez MD;  Location: [facility];  Service: General;  Laterality: N/A;  with partial colectomy    TRACHEOSTOMY N/A 10/27/2020    Procedure: CREATION, TRACHEOSTOMY;  Surgeon: Kar Palma MD;  Location: [facility];  Service: ENT;  Laterality: N/A;       Review of patient's allergies indicates:   Allergen Reactions    Morphine sulfate Hives       No current facility-administered medications on file prior to encounter.      Current Outpatient Medications on File Prior to Encounter   Medication Sig    acetaminophen (TYLENOL) 325 MG tablet Take 325 mg by mouth every 4 (four) hours as needed for Pain.    arginine-glutamine-calcium HMB (CHEO) 7-7-1.5 gram PwPk Take by mouth 2 (two) times a day.    ascorbic acid, vitamin C, (VITAMIN C) 500 MG tablet 1 tablet (500 mg total) by Per G Tube route once daily.    baclofen (LIORESAL) 10 MG tablet 1 tablet (10 mg total) by Per G Tube route 3 (three) times daily.    bisacodyL (DULCOLAX) 10 mg Supp Place 1 suppository (10 mg total) rectally daily as needed (Until bowel movement if patient has no bowel movement  for 2 days).    diphenhydrAMINE (BENADRYL) 12.5 mg/5 mL elixir 10 mLs (25 mg total) by Per G Tube route 4 (four) times daily as needed for Allergies.    gabapentin (NEURONTIN) 250 mg/5 mL solution 5 mLs (250 mg total) by Per G Tube route nightly. At bedtime    lactobacillus acidophilus & bulgar (LACTINEX) 100 million cell packet Take 1 tablet by mouth once daily.    linezolid (ZYVOX) 100 mg/5 mL SusR 30 mLs (600 mg total) by Per G Tube route every 12 (twelve) hours. for 5 days    melatonin (MELATIN) 3 mg tablet 2 tablets (6 mg total) by Per G Tube route nightly as needed for Insomnia.    miconazole nitrate 2% (MICOTIN) 2 % Oint Apply topically 2 (two) times daily. for 14 days    multivitamin liquid no.118 Liqd 10 mLs by Per G Tube route once daily.    PHENobarbitaL 20 mg/5 mL (4 mg/mL) Elix elixir 25 mLs (100 mg total) by Per G Tube route every evening.    potassium chloride (KLOR-CON) 20 mEq Pack DISSOLVE 2 TABLETS AS DIRECTED AND DRINK DAILY    propranoloL (INDERAL) 20 MG tablet 1 tablet (20 mg total) by Per G Tube route 3 (three) times daily. (Patient taking differently: 20 mg by Per G Tube route 4 (four) times daily. )     Family History     Problem Relation (Age of Onset)    Cancer Maternal Grandfather        Tobacco Use    Smoking status: Never Smoker    Smokeless tobacco: Never Used   Substance and Sexual Activity    Alcohol use: Never     Frequency: Never    Drug use: Not Currently    Sexual activity: Never     Review of Systems   Unable to perform ROS: other     Objective:     Vital Signs (Most Recent):  Temp: 97.7 °F (36.5 °C) (12/13/20 1827)  Pulse: (!) 131 (12/13/20 1827)  Resp: 20 (12/13/20 1827)  BP: (!) 135/95 (12/13/20 1827)  SpO2: 100 % (12/13/20 1827) Vital Signs (24h Range):  Temp:  [97.4 °F (36.3 °C)-99.4 °F (37.4 °C)] 97.7 °F (36.5 °C)  Pulse:  [110-140] 131  Resp:  [20-32] 20  SpO2:  [98 %-100 %] 100 %  BP: (134-148)/() 135/95     Weight: 38.1 kg (83 lb 14.4 oz)  Body mass  index is 20.23 kg/m².    SpO2: 100 %  O2 Device (Oxygen Therapy): tracheostomy collar    No intake or output data in the 24 hours ending 12/13/20 1837    Lines/Drains/Airways     Central Venous Catheter Line            Tunneled Central Line Insertion/Assessment - Double Lumen  09/28/20 1400 right subclavian 76 days          Drain                 Gastrostomy/Enterostomy 08/04/20 1653 Percutaneous endoscopic gastrostomy (PEG) feeding 131 days         Gastrostomy/Enterostomy 10/22/20 1300 LUQ 52 days         Colostomy 10/29/20 1349 Loop LLQ 45 days         Urethral Catheter 11/13/20 1230 Latex 16 Fr. 30 days         Urethral Catheter 12/06/20 1207 Latex 16 Fr. 7 days          Airway                 Surgical Airway Shiley Cuffed -- days         Surgical Airway 10/27/20 0855 Shiley Cuffed 47 days         Airway - Non-Surgical 12/08/20 0924 Endotracheal Tube 5 days                Physical Exam   Cardiovascular: Regular rhythm. Tachycardia present.   Pulses:       Carotid pulses are 2+ on the right side and 2+ on the left side.  Pulmonary/Chest:   S/p trach   Abdominal:   S/p PEG       Significant Labs:   ABG: No results for input(s): PH, PCO2, HCO3, POCSATURATED, BE in the last 48 hours., Blood Culture: No results for input(s): LABBLOO in the last 48 hours., BMP:   Recent Labs   Lab 12/13/20  1216   *      K 3.9      CO2 26   BUN 46*   CREATININE 0.7   CALCIUM 7.8*   MG 2.2   , CMP   Recent Labs   Lab 12/13/20  1216      K 3.9      CO2 26   *   BUN 46*   CREATININE 0.7   CALCIUM 7.8*   PROT 5.9*   ALBUMIN 1.4*   BILITOT 0.5   ALKPHOS 1,622*   AST 77*   ALT 34   ANIONGAP 7*   ESTGFRAFRICA >60   EGFRNONAA >60   , CBC   Recent Labs   Lab 12/13/20  1216 12/13/20  1706   WBC 16.90*  --    HGB 8.6*  --    HCT 28.9* 31.1*  31.1*   *  --    , INR   Recent Labs   Lab 12/13/20  1216   INR 1.2   , Lipid Panel No results for input(s): CHOL, HDL, LDLCALC, TRIG, CHOLHDL in the last 48  hours. and Troponin   Recent Labs   Lab 12/13/20  1216   TROPONINI <0.006       Significant Imaging: EKG:

## 2020-12-14 NOTE — CONSULTS
[facility]  Infectious Disease  Consult Note    Patient Name: Glen Moscoso  MRN: [id number]  Admission Date: 12/13/2020  Hospital Length of Stay: 1 days  Attending Physician: Layo Beck DO  Primary Care Provider: Yadi Lawson MD     Isolation Status: No active isolations    Patient information was obtained from past medical records and ER records.      Consults  Assessment/Plan:     Candiduria  No need to treat , will remove Iglesias ,stop fluconazole    Sacral decubitus ulcer  He was seen -10/28/2020- the plan was made to complete 6 weeks of therapy with Cefepime, Flagyl and Vancomycin   Will send ESR and CRP.        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          History of infection with vancomycin resistant Enterococcus (VRE)  Blood culture -11/13- VRE  Repeat Blood culture - no VRE-stop Zyvox    Gastrointestinal hemorrhage  GI follow up     Severe malnutrition  Will encourage enteral feeds    Status post colostomy  Gen surgery follow up     Status post tracheostomy  Continue supportive  Care     Seizure disorder  Will resume home meds    Sinus tachycardia  Discuss with primary team, rule out PE    Cerebral palsy  supportive care         Thank you for your consult. I will follow-up with patient. Please contact us if you have any additional questions.    Kar Morales MD  Infectious Disease  [facility]    Subjective:     Principal Problem: <principal problem not specified>    HPI:   25 year old  male with a PMH of seizures, colitis, acid reflux, severe PCM, Peg tube, pelvic osteo, cerebral palsy, anemia, prtureoctocolitis who was recently discharged from the Hospital -12./11/2020.  All cultures reviewed -tu  12/10- Resp cultures-  ACINETOBACTER BAUMANNII    Many   Susceptibility pending   Abnormal         ESCHERICHIA COLI   Moderate   Susceptibility pending      Blood culture-  11/13- VRE.  Component      Latest Ref Rng & Units 12/14/2020 12/14/2020           7:03 AM  5:22 AM   WBC      3.90 - 12.70 K/uL 15.87 (H)    CT ABDOMEN -   Moderate sized bilateral pleural effusions with adjacent atelectasis/consolidation in the bilateral lower lobes.  Moderate anasarca, worse from the comparison study.  Mild ascites.  Findings are concerning for 3rd spacing, possibly related to renal, hepatic or cardiac dysfunctio    Large bilateral decubitus ulcers with underlying bony involvement in the ischial spine/ischial tuberosity region.  Clinical correlation is advised.     7.  Marked deformity to both hip joints with synovial hypertrophy.       Past Medical History:   Diagnosis Date    Acid reflux     Anemia of chronic disease 8/30/2020    Cerebral palsy     Colitis     Decubitus ulcer of ischial area, left, stage IV 9/10/2020    History of hip surgery     Osteomyelitis of pelvis 9/24/2020    Pneumoperitoneum 9/21/2020    Pressure injury of right ischium, stage 4     S/P percutaneous endoscopic gastrostomy (PEG) tube placement     Seizure disorder 8/26/2020    Seizures     Severe protein-calorie malnutrition     Sinus tachycardia 8/4/2020       Past Surgical History:   Procedure Laterality Date    CHOLECYSTECTOMY      FLEXIBLE SIGMOIDOSCOPY N/A 9/9/2020    Procedure: SIGMOIDOSCOPY, FLEXIBLE;  Surgeon: America Goode MD;  Location: [address]);  Service: Endoscopy;  Laterality: N/A;    HIP SURGERY      REVISION COLOSTOMY N/A 12/8/2020    Procedure: REVISION, COLOSTOMY;  Surgeon: Michael Ortez MD;  Location: [facility] OR;  Service: General;  Laterality: N/A;  with partial colectomy    TRACHEOSTOMY N/A 10/27/2020    Procedure: CREATION, TRACHEOSTOMY;  Surgeon: Kar Palma MD;  Location: [facility] OR;  Service: ENT;  Laterality: N/A;       Review of patient's allergies indicates:   Allergen Reactions    Morphine  sulfate Hives     Patient has tolerated morphine several times in the past.        Medications:  Medications Prior to Admission   Medication Sig    acetaminophen (TYLENOL) 325 MG tablet Take 325 mg by mouth every 4 (four) hours as needed for Pain.    arginine-glutamine-calcium HMB (CHEO) 7-7-1.5 gram PwPk Take by mouth 2 (two) times a day.    ascorbic acid, vitamin C, (VITAMIN C) 500 MG tablet 1 tablet (500 mg total) by Per G Tube route once daily.    baclofen (LIORESAL) 10 MG tablet 1 tablet (10 mg total) by Per G Tube route 3 (three) times daily.    bisacodyL (DULCOLAX) 10 mg Supp Place 1 suppository (10 mg total) rectally daily as needed (Until bowel movement if patient has no bowel movement for 2 days).    diphenhydrAMINE (BENADRYL) 12.5 mg/5 mL elixir 10 mLs (25 mg total) by Per G Tube route 4 (four) times daily as needed for Allergies.    gabapentin (NEURONTIN) 250 mg/5 mL solution 5 mLs (250 mg total) by Per G Tube route nightly. At bedtime    lactobacillus acidophilus & bulgar (LACTINEX) 100 million cell packet Take 1 tablet by mouth once daily.    linezolid (ZYVOX) 100 mg/5 mL SusR 30 mLs (600 mg total) by Per G Tube route every 12 (twelve) hours. for 5 days    melatonin (MELATIN) 3 mg tablet 2 tablets (6 mg total) by Per G Tube route nightly as needed for Insomnia.    miconazole nitrate 2% (MICOTIN) 2 % Oint Apply topically 2 (two) times daily. for 14 days    multivitamin liquid no.118 Liqd 10 mLs by Per G Tube route once daily.    PHENobarbitaL 20 mg/5 mL (4 mg/mL) Elix elixir 25 mLs (100 mg total) by Per G Tube route every evening.    potassium chloride (KLOR-CON) 20 mEq Pack DISSOLVE 2 TABLETS AS DIRECTED AND DRINK DAILY    propranoloL (INDERAL) 20 MG tablet 1 tablet (20 mg total) by Per G Tube route 3 (three) times daily. (Patient taking differently: 20 mg by Per G Tube route 4 (four) times daily. )     Antibiotics (From admission, onward)    Start     Stop Route Frequency Ordered     12/14/20 1630  ampicillin-sulbactam 3 g in sodium chloride 0.9 % 100 mL IVPB (ready to mix system)      -- IV Every 6 hours (non-standard times) 12/14/20 1521    12/14/20 1630  levoFLOXacin 750 mg/150 mL IVPB 750 mg      -- IV Every 24 hours (non-standard times) 12/14/20 1521    12/13/20 2100  mupirocin 2 % ointment      12/18 2059 Nasl 2 times daily 12/13/20 1649    12/13/20 1800  linezolid 600 mg/300 mL IVPB 600 mg      -- IV Every 12 hours (non-standard times) 12/13/20 1649        Antifungals (From admission, onward)    Start     Stop Route Frequency Ordered    12/14/20 1515  fluconazole 40 mg/ml suspension 200 mg      -- PER G TUBE Daily 12/14/20 1507    12/13/20 2100  miconazole nitrate 2% ointment      -- Top 2 times daily 12/13/20 1716        Antivirals (From admission, onward)    None           There is no immunization history for the selected administration types on file for this patient.    Family History     Problem Relation (Age of Onset)    Cancer Maternal Grandfather        Social History     Socioeconomic History    Marital status: Single     Spouse name: Not on file    Number of children: Not on file    Years of education: Not on file    Highest education level: Not on file   Occupational History    Not on file   Social Needs    Financial resource strain: Not on file    Food insecurity     Worry: Not on file     Inability: Not on file    Transportation needs     Medical: Not on file     Non-medical: Not on file   Tobacco Use    Smoking status: Never Smoker    Smokeless tobacco: Never Used   Substance and Sexual Activity    Alcohol use: Never     Frequency: Never    Drug use: Not Currently    Sexual activity: Never   Lifestyle    Physical activity     Days per week: Not on file     Minutes per session: Not on file    Stress: Not on file   Relationships    Social connections     Talks on phone: Not on file     Gets together: Not on file     Attends Orthodox service: Not on file      Active member of club or organization: Not on file     Attends meetings of clubs or organizations: Not on file     Relationship status: Not on file   Other Topics Concern    Not on file   Social History Narrative    Not on file     Review of Systems   Unable to perform ROS: Acuity of condition     Objective:     Vital Signs (Most Recent):  Temp: 100.1 °F (37.8 °C) (12/14/20 1623)  Pulse: (!) 144 (12/14/20 1623)  Resp: 18 (12/14/20 1623)  BP: 126/80 (12/14/20 1623)  SpO2: (!) 92 % (12/14/20 1623) Vital Signs (24h Range):  Temp:  [97.4 °F (36.3 °C)-100.1 °F (37.8 °C)] 100.1 °F (37.8 °C)  Pulse:  [114-154] 144  Resp:  [17-22] 18  SpO2:  [92 %-100 %] 92 %  BP: (120-186)/() 126/80     Weight: 38.1 kg (83 lb 14.4 oz)  Body mass index is 20.23 kg/m².    Estimated Creatinine Clearance: 88.4 mL/min (based on SCr of 0.7 mg/dL).    Physical Exam  Vitals signs and nursing note reviewed.   Constitutional:       General: He is not in acute distress.     Appearance: He is well-developed. He is ill-appearing. He is not diaphoretic.      Comments: At mental baseline   HENT:      Head: Normocephalic and atraumatic.      Nose: Nose normal.   Eyes:      General:         Right eye: No discharge.         Left eye: No discharge.      Conjunctiva/sclera: Conjunctivae normal.      Pupils: Pupils are equal, round, and reactive to light.      Comments: No purposeful eye movement     Neck:      Thyroid: No thyromegaly.      Trachea: Tracheostomy present.   Cardiovascular:      Rate and Rhythm: Regular rhythm. Tachycardia present.      Heart sounds: Normal heart sounds. No murmur.      Comments: Cool extremities  Pulmonary:      Effort: Pulmonary effort is normal. No respiratory distress.      Breath sounds: Normal breath sounds. No wheezing.   Abdominal:      General: There is no distension.      Palpations: Abdomen is soft.      Comments: PEG and colostomy in place. No evidence of bleeding   Genitourinary:     Comments:  catheter  Skin:     General: Skin is warm.      Findings: No rash.   Neurological:      Mental Status: Mental status is at baseline.   Psychiatric:         Behavior: Behavior normal.                 Significant Labs:   Blood Culture:   Recent Labs   Lab 11/13/20  2332 12/06/20  1345 12/06/20  1405 12/13/20  1530 12/13/20  1536   LABBLOO No growth after 5 days.  Gram stain aer bottle: Gram positive cocci in chains resembling Strep   Results called to and read back by: Zee Taylor RN 11/14/2020  22:56  ENTEROCOCCUS FAECIUM VRE* No growth after 5 days. No growth after 5 days. No Growth to date No Growth to date     BMP:   Recent Labs   Lab 12/14/20  0703   *      K 3.6      CO2 23   BUN 36*   CREATININE 0.7   CALCIUM 7.8*   MG 2.0     CBC:   Recent Labs   Lab 12/13/20  1216  12/13/20  2257 12/14/20  0522 12/14/20  0703   WBC 16.90*  --   --   --  15.87*   HGB 8.6*  --   --   --  9.8*   HCT 28.9*   < > 35.0*  35.0* 50.7  50.7 31.9*   *  --   --   --  362*    < > = values in this interval not displayed.     CMP:   Recent Labs   Lab 12/13/20  1216 12/14/20  0703    143   K 3.9 3.6    108   CO2 26 23   * 174*   BUN 46* 36*   CREATININE 0.7 0.7   CALCIUM 7.8* 7.8*   PROT 5.9* 5.5*   ALBUMIN 1.4* 1.4*   BILITOT 0.5 0.7   ALKPHOS 1,622* 1,874*   AST 77* 68*   ALT 34 33   ANIONGAP 7* 12   EGFRNONAA >60 >60     Respiratory Culture:   Recent Labs   Lab 08/26/20  0505 08/31/20  2157 09/06/20  1103 10/03/20  1749 12/10/20  0514 12/13/20  1855   GSRESP <10 epithelial cells per low power field.  Moderate WBC's  Many Gram negative rods  Many Gram negative diplococci <10 epithelial cells per low power field.  Rare WBC's  Rare Gram negative rods >10 epithelial cells per low power field  Rare WBC's  Rare Gram negative rods <10 epithelial cells per low power field.  Rare WBC's  Rare budding yeast <10 epithelial cells per low power field.  Moderate WBC's  Moderate Gram negative  rods  Rare Gram positive cocci  Rare Gram negative diplococci <10 epithelial cells per low power field.  Few yeast  Rare Gram negative rods  Few WBC's   RESPIRATORYC No S aureus isolated.  SERRATIA MARCESCENS  Moderate  Normal respiratory jose also present  *  PSEUDOMONAS AERUGINOSA  Moderate  * No S aureus or Pseudomonas isolated.  SERRATIA MARCESCENS  Moderate  * No S aureus or Pseudomonas isolated.  SERRATIA MARCESCENS  Moderate  * No S aureus or Pseudomonas isolated.  SERRATIA MARCESCENS  Few  * No S aureus or Pseudomonas isolated.  ACINETOBACTER BAUMANNII   Many  Susceptibility pending  *  ESCHERICHIA COLI  Moderate  Susceptibility pending  *  --      Urine Culture:   Recent Labs   Lab 08/26/20  0915 11/13/20  1230 12/06/20  1210   LABURIN No significant growth Multiple organisms isolated. None in predominance.  Repeat if  clinically necessary. CANDIDA TROPICALIS (SUCROSE NEGATIVE)  10,000 - 49,999 cfu/ml  Treatment of asymptomatic candiduria is not recommended (except for   specific populations). Candida isolated in the urine typically   represents colonization. If an indwelling urinary catheter is present  it should be removed or replaced.  *     Wound Culture: No results for input(s): LABAERO in the last 4320 hours.  All pertinent labs within the past 24 hours have been reviewed.    Significant Imaging: I have reviewed all pertinent imaging results/findings within the past 24 hours.

## 2020-12-14 NOTE — CONSULTS
"  [facility]   Adult Nutrition  Consult Note    SUMMARY     Recommendations    Recommendation:   1. When medically appropriate, initiate EN via PEG of   Isosource 1.5 @ 15mL/hr to start and increase 10mL every 4-6 hours, or as tolerated, until goal rate of 40mL/hr is met to provide 960mL total volume; 1440kcals; 65gm protein; 747 mL free H2O.   2. Recommend continuous water flushes @ 30mL/hr or PER MD/NP.   3. Weekly Weights.   4. RD to follow up.     Goals: EN initiation within the next 24 hours.     Nutrition Goal Status: new  Communication of RD Recs: (Plan of Care; Sticky note)    Reason for Assessment    Reason For Assessment: consult  Relevant Medical History: Cerebal palsy, seizures, decubitus ulcer, pressure injury     General Information Comments: RD consulted to assess patient's nutrition status due to malnutrition. Pt familiar to facility with recent tracheostomy 10/27/20 and colostomy revision 12/8/20. He presented to ED due to drainage from tracheostomy incision. Due to current hospital precautions for VRE infection, RD did not directly interview patient or complete NFPE. Although he does meet criteria for malnutrition from other parameters. Documented in greater detail below. LBM on 12/13 per documentation.     Nutrition Discharge Planning: Discharge goal: pending medical course     Nutrition Risk Screen    Nutrition Risk Screen: tube feeding or parenteral nutrition    Nutrition/Diet History    Spiritual, Cultural Beliefs, Restorationism Practices, Values that Affect Care: no  Food Allergies: NKFA    Anthropometrics    Temp: 98.4 °F (36.9 °C)  Height Method: Stated  Height: 4' 6" (137.2 cm)  Height (inches): 54 in  Weight Method: Bed Scale  Weight: 38.1 kg (83 lb 14.4 oz)  Weight (lb): 83.9 lb  Ideal Body Weight (IBW), Male: 70 lb  BMI (Calculated): 20.2  BMI Grade: 18.5-24.9 - normal       Lab/Procedures/Meds    Pertinent Labs Reviewed: reviewed  Pertinent Labs Comments: Elevated Lipase, " Phos 1.7L; BUN 36H; Ca 7.8L; Total protein 5.5L; Alb 1.4L  Pertinent Medications Reviewed: reviewed  Pertinent Medications Comments: Abx    Physical Findings/Assessment     Multiple wounds noted.    Estimated/Assessed Needs    Weight Used For Calorie Calculations: 42 kg (92 lb 9.5 oz)(Ideal Body Weight @ 92#/42kg)  Energy Calorie Requirements (kcal): 1400  Energy Need Method: Pitkin-St Jeor(PAL * 1.2)  Protein Requirements: 50-63 gm(1.2-1.3gm/kg)  Weight Used For Protein Calculations: 42 kg (92 lb 9.5 oz)  Fluid Requirements (mL): 1400  Estimated Fluid Requirement Method: RDA Method(or PER MD)  RDA Method (mL): 1400  CHO Requirement: 175      Nutrition Prescription Ordered    Current Diet Order: NPO.  Nutrition Order Comments: PEG tube present.     Evaluation of Received Nutrient/Fluid Intake          Intake/Output Summary (Last 24 hours) at 12/14/2020 1046  Last data filed at 12/14/2020 0600  Gross per 24 hour   Intake 472.5 ml   Output 675 ml   Net -202.5 ml       % Intake of Estimated Energy Needs: 0 - 25 %  % Meal Intake: NPO    Nutrition Risk    Level of Risk/Frequency of Follow-up: (2x week)     Assessment and Plan    Severe malnutrition  Nutrition Problem:  (Severe) Protein-Calorie Malnutrition  Malnutrition in the context of Chronic Illness/Injury and Acute Illness/Injury    Related to (etiology):  Alteration in GI tract/structure    Signs and Symptoms (as evidenced by):  Energy Intake: <75% of estimated energy requirement for 1 week (PEG tube feedings: due to current hospital problem)  Body Fat Depletion: NICOLA directly due to contact precautions; Pt meets other criteria (PEG feedings; multiple wounds; recent weight loss)  Fluid Accumulation: RD NICOLA    Interventions(treatment strategy):  Nutrition prescription-EN initiation   Collaboration with other providers    Nutrition Diagnosis Status:  New           Monitor and Evaluation    Food and Nutrient Intake: energy intake  Food and Nutrient Adminstration:  enteral and parenteral nutrition administration  Anthropometric Measurements: weight  Biochemical Data, Medical Tests and Procedures: electrolyte and renal panel, gastrointestinal profile, glucose/endocrine profile, inflammatory profile, lipid profile  Nutrition-Focused Physical Findings: overall appearance     Malnutrition Assessment  Malnutrition Type: (Unable to complete NFPE due to current contact precautions)                                    Nutrition Follow-Up    RD Follow-up?: Yes     Shakila Hudson RD, PRIMITIVO

## 2020-12-14 NOTE — PROGRESS NOTES
[facility]  General Surgery  Progress Note    Subjective:     History of Present Illness:  24 yo M with CP POD 5 s/p colostomy revision - sigmoid colectomy with end colostomy to resect prolapse and redundant colon. Presented to ED with melanotic stool and anemia. No visible bleeding at ostomy edges identified in the ED. PEG aspirated and no bloody output appreciated. Surgery was consulted for evaluation.     Post-Op Info:  Procedure(s) (LRB):  EGD (ESOPHAGOGASTRODUODENOSCOPY) (N/A)   * Surgery Date in Future *     Interval History: Patient with NSEMI on EKG and persistent tachycardia overnight. No further melanotic stools but increased LFTs on labs. GI rec imaging. palliative care consulted.    Medications:  Continuous Infusions:   lactated ringers 60 mL/hr at 12/13/20 1828     Scheduled Meds:   enalaprilat  1.25 mg Intravenous Q6H    linezolid  600 mg Intravenous Q12H    meropenem (MERREM) IVPB  500 mg Intravenous Q6H    miconazole nitrate 2%   Topical (Top) BID    mupirocin   Nasal BID    pantoprazole  40 mg Intravenous BID    phenobarbital  100.1 mg Intravenous QHS    sodium chloride 0.9%  500 mL Intravenous Once     PRN Meds:sodium chloride, ondansetron, sodium chloride 0.9%     Review of patient's allergies indicates:   Allergen Reactions    Morphine sulfate Hives     Objective:     Vital Signs (Most Recent):  Temp: 97.8 °F (36.6 °C) (12/14/20 1053)  Pulse: (!) 154 (12/14/20 1100)  Resp: 18 (12/14/20 1053)  BP: (!) 186/93 (12/14/20 1053)  SpO2: 99 % (12/14/20 1053) Vital Signs (24h Range):  Temp:  [97.4 °F (36.3 °C)-99 °F (37.2 °C)] 97.8 °F (36.6 °C)  Pulse:  [110-154] 154  Resp:  [17-32] 18  SpO2:  [97 %-100 %] 99 %  BP: (120-186)/() 186/93     Weight: 38.1 kg (83 lb 14.4 oz)  Body mass index is 20.23 kg/m².    Intake/Output - Last 3 Shifts       12/12 0700 - 12/13 0659 12/13 0700 - 12/14 0659 12/14 0700 - 12/15 0659    Blood  272.5     Other  200     Total Intake(mL/kg)  472.5  (12.4)     Urine (mL/kg/hr)  500     Stool  175 250    Total Output  675 250    Net  -202.5 -250           Stool Occurrence  1 x           Physical Exam  Constitutional:       Comments: Chronically ill  Trach and peg dependent   Abdominal:      General: There is distension (mild).      Comments: PEG in place  Ostomy viable with nonbloody stool in appliance  Surgical incision healing well   Genitourinary:     Comments: Scrotal swelling  Iglesias in place  Musculoskeletal:      Comments: contracture   Skin:     General: Skin is warm.         Significant Labs:  CBC:   Recent Labs   Lab 12/14/20  0703   WBC 15.87*   RBC 3.48*   HGB 9.8*   HCT 31.9*   *   MCV 92   MCH 28.2   MCHC 30.7*     BMP:   Recent Labs   Lab 12/14/20  0703   *      K 3.6      CO2 23   BUN 36*   CREATININE 0.7   CALCIUM 7.8*   MG 2.0       Significant Diagnostics:  CT abd pending    Assessment/Plan:     Gastrointestinal hemorrhage  Discussed case with GI. No evidence of bleeding from surgical source. Likely colon or SB in origin as PEG aspirate clear. Ideally, would wait 6 weeks for any endoscopy through fresh stoma.     Anemia management per medicine    Status post colostomy  5 days post revision by Dr. Ortez. Ostomy patent and no identified source at skin level to account for GI bleed. Melanotic stool suggests upstream source.     GI to manage workup  Will follow from afar. Call with questions      Jacquelyn Rao MD  General Surgery  [facility]

## 2020-12-14 NOTE — HPI
25 year old  male with a PMH of seizures, colitis, acid reflux, severe PCM, Peg tube, pelvic osteo, cerebral palsy, anemia, prtureoctocolitis who was recently discharged from the Hospital -12./11/2020.  All cultures reviewed -  12/10- Resp cultures-  ACINETOBACTER BAUMANNII    Many   Susceptibility pending   Abnormal        ESCHERICHIA COLI   Moderate   Susceptibility pending      Blood culture-  11/13- VRE.  Component      Latest Ref Rng & Units 12/14/2020 12/14/2020           7:03 AM  5:22 AM   WBC      3.90 - 12.70 K/uL 15.87 (H)    CT ABDOMEN -   Moderate sized bilateral pleural effusions with adjacent atelectasis/consolidation in the bilateral lower lobes.  Moderate anasarca, worse from the comparison study.  Mild ascites.  Findings are concerning for 3rd spacing, possibly related to renal, hepatic or cardiac dysfunctio    Large bilateral decubitus ulcers with underlying bony involvement in the ischial spine/ischial tuberosity region.  Clinical correlation is advised.     7.  Marked deformity to both hip joints with synovial hypertrophy.

## 2020-12-14 NOTE — HPI
This is a 24 yo male with PMHx of cerebal palsy, nonverbal, chronic resp failure, trach dependent, chronic anemia, peg tube placement, and prior colostomy placement for fecal diversion due to 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 Ortez on 12/08/2020 due to colostomy prolapse) due to bilateral sacral decubitus ulcers. He is a resident of [facility] where he was sent from today for reports of tachycardia, coffee ground emesis,  and dark/black stool noted coming from his colostomy. His stool for occult blood is positive and he has had a noted two  point drop in his hgb and hct  from 3 days ago. His BP has been stable.  Patient is being placed in hospital for further evaluation and treatment including GI and Surgery consult, Iv Abx for sepsis, and close monitoring of his GI bleed.     Cardiology consult r/o STEMI  EKG on 12/13/2020 11am showed NSR  EKG no 12/13/2020 259 pm showed NSR, ST elevation on II, III and avF. Some mild elevation on AVL. On lead II strip, some beats with normal STT segment,  EKG on 5 pm. NSR St elevation decreased with TWI

## 2020-12-14 NOTE — ASSESSMENT & PLAN NOTE
This is an acute change. US unrevealing. Zyvox does have elevated LFTs and lipase as a potential side effect.  Will monitor.   Agree with CT scan.

## 2020-12-14 NOTE — ASSESSMENT & PLAN NOTE
Dynamical ST segment elevation on EKG    -coronary spasm vs artifact caused by tachycardia  -on indication for Heparin gtt and ASA rx due to ongoing GI bleeding and anemia  -continue medical Rx

## 2020-12-14 NOTE — H&P (VIEW-ONLY)
[facility] -   Gastroenterology  Consult Note    Patient Name: Glen Moscoso  MRN: [id number]  Admission Date: 12/13/2020  Hospital Length of Stay: 1 days  Code Status: Full Code   Attending Provider: Layo Beck DO   Consulting Provider: Kyle Ferrell PA-C  Primary Care Physician: Yadi Lawson MD  Principal Problem:<principal problem not specified>    Inpatient consult to Gastroenterology  Consult performed by: Kyle Ferrell PA-C  Consult ordered by: Anahi Herrera MD  Reason for consult: GI bleed        Subjective:     HPI:  The patient was transferred from [facility] for coffee ground emesis, melena and trach drainage. The patient has CP and non-verbal. His mother is with him but has no details of current symptoms. The patient had a colostomy revision 12/08/20 by Dr. Ortez. He was discharged with a Hgb of 10.4 after transfusion. His baseline is around 9. On this admit, it was 8.6. He received a unit of blood and repeat Hgb pending. The patient also had Dynamical ST segment elevation on EKG. Cardiology was consulted and felt it was coronary spasm vs artifact caused by tachycardia. The patient's ostomy output is currently brown. PEG was flushed and non-bloody per report. The patient is also noted to have significant elevation in ALP (1,600) which is new. T. Bili and ALT are normal. AST mildly elevated. GGT 2,000. Ultrasound was unrevealing. He was started on Zyvox last admit.       Past Medical History:   Diagnosis Date    Acid reflux     Anemia of chronic disease 8/30/2020    Cerebral palsy     Colitis     Decubitus ulcer of ischial area, left, stage IV 9/10/2020    History of hip surgery     Osteomyelitis of pelvis 9/24/2020    Pneumoperitoneum 9/21/2020    Pressure injury of right ischium, stage 4     S/P percutaneous endoscopic gastrostomy (PEG) tube placement     Seizure disorder 8/26/2020    Seizures     Severe protein-calorie malnutrition     Sinus tachycardia  8/4/2020       Past Surgical History:   Procedure Laterality Date    CHOLECYSTECTOMY      FLEXIBLE SIGMOIDOSCOPY N/A 9/9/2020    Procedure: SIGMOIDOSCOPY, FLEXIBLE;  Surgeon: America Goode MD;  Location: [address]);  Service: Endoscopy;  Laterality: N/A;    HIP SURGERY      REVISION COLOSTOMY N/A 12/8/2020    Procedure: REVISION, COLOSTOMY;  Surgeon: Michael Ortez MD;  Location: [facility] OR;  Service: General;  Laterality: N/A;  with partial colectomy    TRACHEOSTOMY N/A 10/27/2020    Procedure: CREATION, TRACHEOSTOMY;  Surgeon: Kar Palma MD;  Location: [facility] OR;  Service: ENT;  Laterality: N/A;       Review of patient's allergies indicates:   Allergen Reactions    Morphine sulfate Hives     Family History     Problem Relation (Age of Onset)    Cancer Maternal Grandfather        Tobacco Use    Smoking status: Never Smoker    Smokeless tobacco: Never Used   Substance and Sexual Activity    Alcohol use: Never     Frequency: Never    Drug use: Not Currently    Sexual activity: Never     Review of Systems   Unable to perform ROS: Patient nonverbal     Objective:     Vital Signs (Most Recent):  Temp: 98.4 °F (36.9 °C) (12/14/20 0700)  Pulse: (!) 130 (12/14/20 0700)  Resp: 20 (12/14/20 0700)  BP: (!) 130/90 (12/14/20 0700)  SpO2: 97 % (12/14/20 0700) Vital Signs (24h Range):  Temp:  [97.4 °F (36.3 °C)-99.4 °F (37.4 °C)] 98.4 °F (36.9 °C)  Pulse:  [110-140] 130  Resp:  [17-32] 20  SpO2:  [97 %-100 %] 97 %  BP: (120-148)/() 130/90     Weight: 38.1 kg (83 lb 14.4 oz) (12/13/20 1207)  Body mass index is 20.23 kg/m².      Intake/Output Summary (Last 24 hours) at 12/14/2020 0828  Last data filed at 12/14/2020 0600  Gross per 24 hour   Intake 472.5 ml   Output 675 ml   Net -202.5 ml       Lines/Drains/Airways     Central Venous Catheter Line            Tunneled Central Line Insertion/Assessment - Double Lumen  09/28/20 1400 right subclavian 76 days          Drain                  Gastrostomy/Enterostomy 08/04/20 1653 Percutaneous endoscopic gastrostomy (PEG) feeding 131 days         Gastrostomy/Enterostomy 10/22/20 1300 LUQ 52 days         Colostomy 10/29/20 1349 Loop LLQ 45 days         Urethral Catheter 11/13/20 1230 Latex 16 Fr. 30 days         Urethral Catheter 12/06/20 1207 Latex 16 Fr. 7 days          Airway                 Surgical Airway Shiley Cuffed -- days         Surgical Airway 10/27/20 0855 Shiley Cuffed 48 days         Airway - Non-Surgical 12/08/20 0924 Endotracheal Tube 5 days                Physical Exam  Constitutional:       General: He is awake.      Appearance: He is cachectic. He is ill-appearing.   HENT:      Head: Normocephalic.   Eyes:      Extraocular Movements: Extraocular movements intact.   Neck:      Trachea: Tracheostomy present.   Cardiovascular:      Rate and Rhythm: Regular rhythm. Tachycardia present.      Heart sounds: Normal heart sounds. No murmur.   Pulmonary:      Effort: Pulmonary effort is normal. No respiratory distress.      Breath sounds: Normal breath sounds. No wheezing.      Comments: Trach noted.  Abdominal:      General: Bowel sounds are decreased. There is distension.      Palpations: Abdomen is soft. There is no hepatomegaly or mass.      Tenderness: There is no abdominal tenderness.   Musculoskeletal:      Right lower leg: No edema.      Left lower leg: No edema.   Skin:     General: Skin is dry.      Findings: No rash.         Significant Labs:  CBC:   Recent Labs   Lab 12/13/20  1216  12/13/20  2257 12/14/20  0522 12/14/20  0703   WBC 16.90*  --   --   --  15.87*   HGB 8.6*  --   --   --  9.8*   HCT 28.9*   < > 35.0*  35.0* 50.7  50.7 31.9*   *  --   --   --  362*    < > = values in this interval not displayed.     CMP:   Recent Labs   Lab 12/14/20  0703   *   CALCIUM 7.8*   ALBUMIN 1.4*   PROT 5.5*      K 3.6   CO2 23      BUN 36*   CREATININE 0.7   ALKPHOS 1,874*   ALT 33   AST 68*   BILITOT 0.7      Coagulation:   Recent Labs   Lab 12/13/20  1216   INR 1.2   APTT <21.0       Significant Imaging:  Imaging results within the past 24 hours have been reviewed.    Assessment/Plan:     Melena  Reported melena but now resolved with brown ostomy output. PEG without overt blood.   Hgb dropped over the last few days but close to baseline. Awaiting Am labs.   Can consider EGD today, but yield likely low. Will discuss with General Surgery team.   Continue Protonix.   Monitor H/H.     Elevated alkaline phosphatase level  This is an acute change. US unrevealing. Zyvox does have elevated LFTs and lipase as a potential side effect.  Will monitor.   Agree with CT scan.     Abnormal EKG  Cardiology note reviewed.     Sepsis  Antibiotics per  team.     Hypertensive urgency  Management per  team and Cardiology.         Thank you for your consult. I will follow-up with patient. Please contact us if you have any additional questions.    Kyle Ferrell PA-C  Gastroenterology  [facility]

## 2020-12-14 NOTE — ASSESSMENT & PLAN NOTE
He was seen -10/28/2020- the plan was made to complete 6 weeks of therapy with Cefepime, Flagyl and Vancomycin   Will send ESR and CRP.

## 2020-12-14 NOTE — ASSESSMENT & PLAN NOTE
Reported melena but now resolved with brown ostomy output. PEG without overt blood.   Hgb dropped over the last few days but close to baseline. Awaiting Am labs.   Can consider EGD today, but yield likely low. Will discuss with General Surgery team.   Continue Protonix.   Monitor H/H.

## 2020-12-14 NOTE — PLAN OF CARE
POC reviewed, verbalized understanding. Pt remained free from falls, fall precautions in place. Pt is ST on monitor VSS. No other c/o at this time. PIV intact, LR @ 60 ml/hr, 1 unit of RBCs infusing . 4L Trachcollar. Call bell and personal belongings within reach. Hourly rounding complete. Reminded to call for assistance. Will continue to monitor.

## 2020-12-14 NOTE — PROGRESS NOTES
"[facility] Hospital Medicine  Progress Note    Patient Name: Glen Moscoso  MRN: [id number]  Patient Class: IP- Inpatient   Admission Date: 12/13/2020  Length of Stay: 1 days  Attending Physician: Layo Beck DO  Primary Care Provider: Yadi Lawson MD        Subjective:     Principal Problem:<principal problem not specified>        HPI:  Per ER report- This is a 24 yo male with PMHx of cerebal palsy, nonverbal, chronic resp failure, trach dependent, chronic anemia, peg tube placement, and prior colostomy placement for fecal diversion due to 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 Ortez on 12/08/2020 due to colostomy prolapse) due to bilateral sacral decubitus ulcers. He is a resident of [facility] where he was sent from today for reports of tachycardia, coffee ground emesis,  and dark/black stool noted coming from his colostomy. His stool for occult blood is positive and he has had a noted two  point drop in his hgb and hct  from 3 days ago. His BP has been stable.  Patient is being placed in hospital for further evaluation and treatment including GI and Surgery consult, Iv Abx for sepsis, and close monitoring of his GI bleed.     Patient seen in ER.    I had a long discussion with Mother, Leni Moscoso, [phone], at bedside and updated her on patient's status. Patient is extremely debilitated with multiple comorbidities. Mother is the decision maker and states patient is a Full Code. I explained to her patient's guarded prognosis and she  "wants everything done".      Overview/Hospital Course:  12/14:  Patient overall stable overnight however continues to be tachycardic.  Range of between 01/20/2040.  His alkaline phosphatase has trended upwards.  Appreciate gastroenterology recommendations.  No evidence of active bleeding at this time per PEG colostomy.  Getting CT abdomen and pelvis with contrast for " further insight into alkaline phosphatase and GGT elevation.  Discussed case Dr. Ortez and GI Dr. Bautista. Consulted palliative care to discuss goals of care. Discussed with Amanda Colmenares (palliative) and mother does not want to consider what to do if his condition worsens    Interval History:   Patient overall stable overnight however continues to be tachycardic.  Range of between 01/20/2040.  His alkaline phosphatase has trended upwards.  Appreciate gastroenterology recommendations.  No evidence of active bleeding at this time per PEG colostomy.  Getting CT abdomen and pelvis with contrast for further insight into alkaline phosphatase and GGT elevation.  Discussed case Dr. Ortez and GI Dr. Bautista. Consulted palliative care to discuss goals of care. Discussed with Amanda Colmenares (palliative) and mother does not want to consider what to do if his condition worsens    Review of Systems   Unable to perform ROS: Patient nonverbal     Objective:     Vital Signs (Most Recent):  Temp: 97.8 °F (36.6 °C) (12/14/20 1053)  Pulse: (!) 140 (12/14/20 1053)  Resp: 18 (12/14/20 1053)  BP: (!) 186/93 (12/14/20 1053)  SpO2: 99 % (12/14/20 1053) Vital Signs (24h Range):  Temp:  [97.4 °F (36.3 °C)-99.4 °F (37.4 °C)] 97.8 °F (36.6 °C)  Pulse:  [110-140] 140  Resp:  [17-32] 18  SpO2:  [97 %-100 %] 99 %  BP: (120-186)/() 186/93     Weight: 38.1 kg (83 lb 14.4 oz)  Body mass index is 20.23 kg/m².    Intake/Output Summary (Last 24 hours) at 12/14/2020 1145  Last data filed at 12/14/2020 0600  Gross per 24 hour   Intake 472.5 ml   Output 675 ml   Net -202.5 ml      Physical Exam  Vitals signs and nursing note reviewed.   Constitutional:       General: He is not in acute distress.     Appearance: He is well-developed. He is ill-appearing. He is not diaphoretic.      Comments: At mental baseline   HENT:      Head: Normocephalic and atraumatic.      Nose: Nose normal.   Eyes:      General:         Right eye: No discharge.          Left eye: No discharge.      Conjunctiva/sclera: Conjunctivae normal.      Pupils: Pupils are equal, round, and reactive to light.      Comments: No purposeful eye movement     Neck:      Thyroid: No thyromegaly.      Trachea: Tracheostomy present.   Cardiovascular:      Rate and Rhythm: Regular rhythm. Tachycardia present.      Heart sounds: Normal heart sounds. No murmur.      Comments: Cool extremities  Pulmonary:      Effort: Pulmonary effort is normal. No respiratory distress.      Breath sounds: Normal breath sounds. No wheezing.   Abdominal:      General: There is no distension.      Palpations: Abdomen is soft.      Comments: PEG and colostomy in place. No evidence of bleeding   Genitourinary:     Comments: catheter  Skin:     General: Skin is warm.      Findings: No rash.   Neurological:      Mental Status: He is oriented to person, place, and time.   Psychiatric:         Behavior: Behavior normal.         Significant Labs:   Blood Culture:   Recent Labs   Lab 12/13/20  1530 12/13/20  1536   LABBLOO No Growth to date No Growth to date     BMP:   Recent Labs   Lab 12/14/20  0703   *      K 3.6      CO2 23   BUN 36*   CREATININE 0.7   CALCIUM 7.8*   MG 2.0     CBC:   Recent Labs   Lab 12/13/20  1216  12/13/20  2257 12/14/20  0522 12/14/20  0703   WBC 16.90*  --   --   --  15.87*   HGB 8.6*  --   --   --  9.8*   HCT 28.9*   < > 35.0*  35.0* 50.7  50.7 31.9*   *  --   --   --  362*    < > = values in this interval not displayed.     CMP:   Recent Labs   Lab 12/13/20  1216 12/14/20  0703    143   K 3.9 3.6    108   CO2 26 23   * 174*   BUN 46* 36*   CREATININE 0.7 0.7   CALCIUM 7.8* 7.8*   PROT 5.9* 5.5*   ALBUMIN 1.4* 1.4*   BILITOT 0.5 0.7   ALKPHOS 1,622* 1,874*   AST 77* 68*   ALT 34 33   ANIONGAP 7* 12   EGFRNONAA >60 >60     Cardiac Markers: No results for input(s): CKMB, MYOGLOBIN, BNP, TROPISTAT in the last 48 hours.  Lactic Acid:   Recent Labs   Lab  12/13/20  1223   LACTATE 1.5     Magnesium:   Recent Labs   Lab 12/13/20  1216 12/14/20  0703   MG 2.2 2.0     Troponin:   Recent Labs   Lab 12/13/20  2148 12/14/20  0220 12/14/20  0703   TROPONINI 0.112* <0.006 <0.006     Urine Studies:   Recent Labs   Lab 12/13/20  1420   COLORU Yellow   APPEARANCEUA Cloudy*   PHUR 6.0   SPECGRAV 1.020   PROTEINUA 2+*   GLUCUA Negative   KETONESU Negative   BILIRUBINUA Negative   OCCULTUA 2+*   NITRITE Negative   UROBILINOGEN Negative   LEUKOCYTESUR 3+*   RBCUA 3   WBCUA 3   BACTERIA Few*   SQUAMEPITHEL 5   HYALINECASTS 0       Significant Imaging: I have reviewed all pertinent imaging results/findings within the past 24 hours.      Assessment/Plan:      Elevated alkaline phosphatase level  GGT also high. Pending CT abd/pelvis with contrast. Appreciate GI recs. Consideration of zyvox as cause?      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 Contact precautions      Sepsis  This patient does have evidence of infective focus  My overall impression is sepsis. Vital signs were reviewed and noted in progress note.  Antibiotics given-   Antibiotics (From admission, onward)    Start     Stop Route Frequency Ordered    12/13/20 2100  mupirocin 2 % ointment      12/18 2059 Nasl 2 times daily 12/13/20 1649 12/13/20 1800  meropenem-0.9% sodium chloride 500 mg/50 mL IVPB      -- IV Every 6 hours (non-standard times) 12/13/20 1649    12/13/20 1800  linezolid 600 mg/300 mL IVPB 600 mg      -- IV Every 12 hours (non-standard  times) 12/13/20 1649        Cultures were taken-   Microbiology Results (last 7 days)     Procedure Component Value Units Date/Time    Blood culture x two cultures. Draw prior to antibiotics. [[id number]] Collected: 12/13/20 1530    Order Status: Completed Specimen: Blood from Peripheral, Antecubital, Left Updated: 12/14/20 0915     Blood Culture, Routine No Growth to date    Narrative:      Aerobic and anaerobic    Blood culture x two cultures. Draw prior to antibiotics. [[id number]] Collected: 12/13/20 1536    Order Status: Completed Specimen: Blood from Peripheral, Antecubital, Left Updated: 12/14/20 0915     Blood Culture, Routine No Growth to date    Narrative:      Aerobic and anaerobic    Culture, Respiratory with Gram Stain [[id number]] Collected: 12/13/20 1855    Order Status: Completed Specimen: Respiratory from Sputum Updated: 12/14/20 0417     Gram Stain (Respiratory) <10 epithelial cells per low power field.     Gram Stain (Respiratory) Few yeast     Gram Stain (Respiratory) Rare Gram negative rods     Gram Stain (Respiratory) Few WBC's        Latest lactate reviewed, they are-  Recent Labs   Lab 12/13/20  1223   LACTATE 1.5     Source- Suspected GI    Source control Achieved by- Iv Abx  ID, GI,  and General Surgery consulted        Hypertensive urgency  Monitor  Telemetry  Patient received Iv bblockade in ER- Patient with acute GI bleed- Add Iv vasotec for now and monitor heart rate  Monitor        Gastrointestinal hemorrhage  Hemoglobin with minimal change at this time (near baseline)  Monitor HGB and HCT q 6 hours  Patient to received 1 unit PRBCs thus far    Severe malnutrition  Appreciate dietary recs      Status post colostomy  S/p recent Colostomy placement on 10/29/2020 followed by Revision due to Colostomy prolapse on 12/08/2020. Dr. Ortez who did surgery is aware of admit.       Seizure disorder  Patient currently NPO- Will give home dose phenobarbital Iv- Discussed with pharmacy      Cerebral  palsy  Underlying complicating factor to current medical course.  He at baseline is nonverbal and minimally responsive.  Discussion with mom and also have consulted palliative care.  Continues to be full code.        VTE Risk Mitigation (From admission, onward)         Ordered     IP VTE HIGH RISK PATIENT  Once      12/13/20 1726     Place SHANELLE hose  Until discontinued      12/13/20 1726     Place SHANELLE hose  Until discontinued      12/13/20 1658                Discharge Planning   ROCÍO:      Code Status: Full Code   Is the patient medically ready for discharge?:     Reason for patient still in hospital (select all that apply): Patient unstable                     Layo Beck DO  Department of Hospital Medicine   [facility]

## 2020-12-14 NOTE — SUBJECTIVE & OBJECTIVE
Interval History:   Patient overall stable overnight however continues to be tachycardic.  Range of between 01/20/2040.  His alkaline phosphatase has trended upwards.  Appreciate gastroenterology recommendations.  No evidence of active bleeding at this time per PEG colostomy.  Getting CT abdomen and pelvis with contrast for further insight into alkaline phosphatase and GGT elevation.  Discussed case Dr. Ortez and GI Dr. Bautista. Consulted palliative care to discuss goals of care. Discussed with Amanda Colmenares (palliative) and mother does not want to consider what to do if his condition worsens    Review of Systems   Unable to perform ROS: Patient nonverbal     Objective:     Vital Signs (Most Recent):  Temp: 97.8 °F (36.6 °C) (12/14/20 1053)  Pulse: (!) 140 (12/14/20 1053)  Resp: 18 (12/14/20 1053)  BP: (!) 186/93 (12/14/20 1053)  SpO2: 99 % (12/14/20 1053) Vital Signs (24h Range):  Temp:  [97.4 °F (36.3 °C)-99.4 °F (37.4 °C)] 97.8 °F (36.6 °C)  Pulse:  [110-140] 140  Resp:  [17-32] 18  SpO2:  [97 %-100 %] 99 %  BP: (120-186)/() 186/93     Weight: 38.1 kg (83 lb 14.4 oz)  Body mass index is 20.23 kg/m².    Intake/Output Summary (Last 24 hours) at 12/14/2020 1145  Last data filed at 12/14/2020 0600  Gross per 24 hour   Intake 472.5 ml   Output 675 ml   Net -202.5 ml      Physical Exam  Vitals signs and nursing note reviewed.   Constitutional:       General: He is not in acute distress.     Appearance: He is well-developed. He is ill-appearing. He is not diaphoretic.      Comments: At mental baseline   HENT:      Head: Normocephalic and atraumatic.      Nose: Nose normal.   Eyes:      General:         Right eye: No discharge.         Left eye: No discharge.      Conjunctiva/sclera: Conjunctivae normal.      Pupils: Pupils are equal, round, and reactive to light.      Comments: No purposeful eye movement     Neck:      Thyroid: No thyromegaly.      Trachea: Tracheostomy present.   Cardiovascular:      Rate and  Rhythm: Regular rhythm. Tachycardia present.      Heart sounds: Normal heart sounds. No murmur.      Comments: Cool extremities  Pulmonary:      Effort: Pulmonary effort is normal. No respiratory distress.      Breath sounds: Normal breath sounds. No wheezing.   Abdominal:      General: There is no distension.      Palpations: Abdomen is soft.      Comments: PEG and colostomy in place. No evidence of bleeding   Genitourinary:     Comments: catheter  Skin:     General: Skin is warm.      Findings: No rash.   Neurological:      Mental Status: He is oriented to person, place, and time.   Psychiatric:         Behavior: Behavior normal.         Significant Labs:   Blood Culture:   Recent Labs   Lab 12/13/20  1530 12/13/20  1536   LABBLOO No Growth to date No Growth to date     BMP:   Recent Labs   Lab 12/14/20  0703   *      K 3.6      CO2 23   BUN 36*   CREATININE 0.7   CALCIUM 7.8*   MG 2.0     CBC:   Recent Labs   Lab 12/13/20  1216  12/13/20  2257 12/14/20  0522 12/14/20  0703   WBC 16.90*  --   --   --  15.87*   HGB 8.6*  --   --   --  9.8*   HCT 28.9*   < > 35.0*  35.0* 50.7  50.7 31.9*   *  --   --   --  362*    < > = values in this interval not displayed.     CMP:   Recent Labs   Lab 12/13/20  1216 12/14/20  0703    143   K 3.9 3.6    108   CO2 26 23   * 174*   BUN 46* 36*   CREATININE 0.7 0.7   CALCIUM 7.8* 7.8*   PROT 5.9* 5.5*   ALBUMIN 1.4* 1.4*   BILITOT 0.5 0.7   ALKPHOS 1,622* 1,874*   AST 77* 68*   ALT 34 33   ANIONGAP 7* 12   EGFRNONAA >60 >60     Cardiac Markers: No results for input(s): CKMB, MYOGLOBIN, BNP, TROPISTAT in the last 48 hours.  Lactic Acid:   Recent Labs   Lab 12/13/20  1223   LACTATE 1.5     Magnesium:   Recent Labs   Lab 12/13/20  1216 12/14/20  0703   MG 2.2 2.0     Troponin:   Recent Labs   Lab 12/13/20  2148 12/14/20  0220 12/14/20  0703   TROPONINI 0.112* <0.006 <0.006     Urine Studies:   Recent Labs   Lab 12/13/20  1420   COLORU Yellow    APPEARANCEUA Cloudy*   PHUR 6.0   SPECGRAV 1.020   PROTEINUA 2+*   GLUCUA Negative   KETONESU Negative   BILIRUBINUA Negative   OCCULTUA 2+*   NITRITE Negative   UROBILINOGEN Negative   LEUKOCYTESUR 3+*   RBCUA 3   WBCUA 3   BACTERIA Few*   SQUAMEPITHEL 5   HYALINECASTS 0       Significant Imaging: I have reviewed all pertinent imaging results/findings within the past 24 hours.

## 2020-12-14 NOTE — CONSULTS
[facility] -   Cardiology  Consult Note    Patient Name: Glen Moscoso  MRN: [id number]  Admission Date: 12/13/2020  Hospital Length of Stay: 0 days  Code Status: Full Code   Attending Provider: Layo Beck DO   Consulting Provider: Herrera Maldonado MD  Primary Care Physician: Yadi Lawson MD  Principal Problem:<principal problem not specified>    Patient information was obtained from patient and ER records.     Inpatient consult to Cardiology  Consult performed by: Herrera Maldonado MD  Consult ordered by: ANNIKA Suarez        Subjective:       HPI:    This is a 26 yo male with PMHx of cerebal palsy, nonverbal, chronic resp failure, trach dependent, chronic anemia, peg tube placement, and prior colostomy placement for fecal diversion due to 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 Ortez on 12/08/2020 due to colostomy prolapse) due to bilateral sacral decubitus ulcers. He is a resident of [facility] where he was sent from today for reports of tachycardia, coffee ground emesis,  and dark/black stool noted coming from his colostomy. His stool for occult blood is positive and he has had a noted two  point drop in his hgb and hct  from 3 days ago. His BP has been stable.  Patient is being placed in hospital for further evaluation and treatment including GI and Surgery consult, Iv Abx for sepsis, and close monitoring of his GI bleed.     Cardiology consult r/o STEMI  EKG on 12/13/2020 11am showed NSR  EKG no 12/13/2020 259 pm showed NSR, ST elevation on II, III and avF. Some mild elevation on AVL. On lead II strip, some beats with normal STT segment,  EKG on 5 pm. NSR St elevation decreased with TWI    Past Medical History:   Diagnosis Date    Acid reflux     Anemia of chronic disease 8/30/2020    Cerebral palsy     Colitis     Decubitus ulcer of ischial area, left, stage IV 9/10/2020    History of hip surgery      Osteomyelitis of pelvis 9/24/2020    Pneumoperitoneum 9/21/2020    Pressure injury of right ischium, stage 4     S/P percutaneous endoscopic gastrostomy (PEG) tube placement     Seizure disorder 8/26/2020    Seizures     Severe protein-calorie malnutrition     Sinus tachycardia 8/4/2020       Past Surgical History:   Procedure Laterality Date    CHOLECYSTECTOMY      FLEXIBLE SIGMOIDOSCOPY N/A 9/9/2020    Procedure: SIGMOIDOSCOPY, FLEXIBLE;  Surgeon: America Goode MD;  Location: [address]);  Service: Endoscopy;  Laterality: N/A;    HIP SURGERY      REVISION COLOSTOMY N/A 12/8/2020    Procedure: REVISION, COLOSTOMY;  Surgeon: Michael Ortez MD;  Location: [facility];  Service: General;  Laterality: N/A;  with partial colectomy    TRACHEOSTOMY N/A 10/27/2020    Procedure: CREATION, TRACHEOSTOMY;  Surgeon: Kar Palma MD;  Location: [facility] OR;  Service: ENT;  Laterality: N/A;       Review of patient's allergies indicates:   Allergen Reactions    Morphine sulfate Hives       No current facility-administered medications on file prior to encounter.      Current Outpatient Medications on File Prior to Encounter   Medication Sig    acetaminophen (TYLENOL) 325 MG tablet Take 325 mg by mouth every 4 (four) hours as needed for Pain.    arginine-glutamine-calcium HMB (CHEO) 7-7-1.5 gram PwPk Take by mouth 2 (two) times a day.    ascorbic acid, vitamin C, (VITAMIN C) 500 MG tablet 1 tablet (500 mg total) by Per G Tube route once daily.    baclofen (LIORESAL) 10 MG tablet 1 tablet (10 mg total) by Per G Tube route 3 (three) times daily.    bisacodyL (DULCOLAX) 10 mg Supp Place 1 suppository (10 mg total) rectally daily as needed (Until bowel movement if patient has no bowel movement for 2 days).    diphenhydrAMINE (BENADRYL) 12.5 mg/5 mL elixir 10 mLs (25 mg total) by Per G Tube route 4 (four) times daily as needed for Allergies.    gabapentin (NEURONTIN) 250 mg/5 mL solution 5 mLs (250 mg total) by  Per G Tube route nightly. At bedtime    lactobacillus acidophilus & bulgar (LACTINEX) 100 million cell packet Take 1 tablet by mouth once daily.    linezolid (ZYVOX) 100 mg/5 mL SusR 30 mLs (600 mg total) by Per G Tube route every 12 (twelve) hours. for 5 days    melatonin (MELATIN) 3 mg tablet 2 tablets (6 mg total) by Per G Tube route nightly as needed for Insomnia.    miconazole nitrate 2% (MICOTIN) 2 % Oint Apply topically 2 (two) times daily. for 14 days    multivitamin liquid no.118 Liqd 10 mLs by Per G Tube route once daily.    PHENobarbitaL 20 mg/5 mL (4 mg/mL) Elix elixir 25 mLs (100 mg total) by Per G Tube route every evening.    potassium chloride (KLOR-CON) 20 mEq Pack DISSOLVE 2 TABLETS AS DIRECTED AND DRINK DAILY    propranoloL (INDERAL) 20 MG tablet 1 tablet (20 mg total) by Per G Tube route 3 (three) times daily. (Patient taking differently: 20 mg by Per G Tube route 4 (four) times daily. )     Family History     Problem Relation (Age of Onset)    Cancer Maternal Grandfather        Tobacco Use    Smoking status: Never Smoker    Smokeless tobacco: Never Used   Substance and Sexual Activity    Alcohol use: Never     Frequency: Never    Drug use: Not Currently    Sexual activity: Never     Review of Systems   Unable to perform ROS: other     Objective:     Vital Signs (Most Recent):  Temp: 97.7 °F (36.5 °C) (12/13/20 1827)  Pulse: (!) 131 (12/13/20 1827)  Resp: 20 (12/13/20 1827)  BP: (!) 135/95 (12/13/20 1827)  SpO2: 100 % (12/13/20 1827) Vital Signs (24h Range):  Temp:  [97.4 °F (36.3 °C)-99.4 °F (37.4 °C)] 97.7 °F (36.5 °C)  Pulse:  [110-140] 131  Resp:  [20-32] 20  SpO2:  [98 %-100 %] 100 %  BP: (134-148)/() 135/95     Weight: 38.1 kg (83 lb 14.4 oz)  Body mass index is 20.23 kg/m².    SpO2: 100 %  O2 Device (Oxygen Therapy): tracheostomy collar    No intake or output data in the 24 hours ending 12/13/20 1837    Lines/Drains/Airways     Central Venous Catheter Line             Tunneled Central Line Insertion/Assessment - Double Lumen  09/28/20 1400 right subclavian 76 days          Drain                 Gastrostomy/Enterostomy 08/04/20 1653 Percutaneous endoscopic gastrostomy (PEG) feeding 131 days         Gastrostomy/Enterostomy 10/22/20 1300 LUQ 52 days         Colostomy 10/29/20 1349 Loop LLQ 45 days         Urethral Catheter 11/13/20 1230 Latex 16 Fr. 30 days         Urethral Catheter 12/06/20 1207 Latex 16 Fr. 7 days          Airway                 Surgical Airway Shiley Cuffed -- days         Surgical Airway 10/27/20 0855 Shiley Cuffed 47 days         Airway - Non-Surgical 12/08/20 0924 Endotracheal Tube 5 days                Physical Exam   Cardiovascular: Regular rhythm. Tachycardia present.   Pulses:       Carotid pulses are 2+ on the right side and 2+ on the left side.  Pulmonary/Chest:   S/p trach   Abdominal:   S/p PEG       Significant Labs:   ABG: No results for input(s): PH, PCO2, HCO3, POCSATURATED, BE in the last 48 hours., Blood Culture: No results for input(s): LABBLOO in the last 48 hours., BMP:   Recent Labs   Lab 12/13/20  1216   *      K 3.9      CO2 26   BUN 46*   CREATININE 0.7   CALCIUM 7.8*   MG 2.2   , CMP   Recent Labs   Lab 12/13/20  1216      K 3.9      CO2 26   *   BUN 46*   CREATININE 0.7   CALCIUM 7.8*   PROT 5.9*   ALBUMIN 1.4*   BILITOT 0.5   ALKPHOS 1,622*   AST 77*   ALT 34   ANIONGAP 7*   ESTGFRAFRICA >60   EGFRNONAA >60   , CBC   Recent Labs   Lab 12/13/20  1216 12/13/20  1706   WBC 16.90*  --    HGB 8.6*  --    HCT 28.9* 31.1*  31.1*   *  --    , INR   Recent Labs   Lab 12/13/20  1216   INR 1.2   , Lipid Panel No results for input(s): CHOL, HDL, LDLCALC, TRIG, CHOLHDL in the last 48 hours. and Troponin   Recent Labs   Lab 12/13/20  1216   TROPONINI <0.006       Significant Imaging: EKG:      Assessment and Plan:     Abnormal EKG  Dynamical ST segment elevation on EKG    -coronary spasm vs artifact  caused by tachycardia  -on indication for Heparin gtt and ASA rx due to ongoing GI bleeding and anemia  -continue medical Rx    Sacral decubitus ulcer  Rx per HM    Gastrointestinal hemorrhage  Rx per HM    Cerebral palsy  Rx per HM        VTE Risk Mitigation (From admission, onward)         Ordered     IP VTE HIGH RISK PATIENT  Once      12/13/20 1726     Place SHANELLE hose  Until discontinued      12/13/20 1726     Place SHANELLE hose  Until discontinued      12/13/20 1658                Thank you for your consult. I will sign off. Please contact us if you have any additional questions.    Herrera Maldonado MD  Cardiology   [facility]

## 2020-12-14 NOTE — PLAN OF CARE
Recommendations     Recommendation:   1. When medically appropriate, initiate EN via PEG of   Isosource 1.5 @ 15mL/hr to start and increase 10mL every 4-6 hours, or as tolerated, until goal rate of 40mL/hr is met to provide 960mL total volume; 1440kcals; 65gm protein; 747 mL free H2O.   2. Recommend continuous water flushes @ 30mL/hr or PER MD/NP.   3. Weekly Weights.   4. RD to follow up.      Goals: EN initiation within the next 24 hours.      Nutrition Goal Status: new  Communication of RD Recs: (Plan of Care; Sticky note)    Shakila Hudson RD, LDN

## 2020-12-14 NOTE — ASSESSMENT & PLAN NOTE
Nutrition Problem:  (Severe) Protein-Calorie Malnutrition  Malnutrition in the context of Chronic Illness/Injury and Acute Illness/Injury    Related to (etiology):  Alteration in GI tract/structure    Signs and Symptoms (as evidenced by):  Energy Intake: <75% of estimated energy requirement for 1 week (PEG tube feedings: due to current hospital problem)  Body Fat Depletion: NICOLA directly due to contact precautions; Pt meets other criteria (PEG feedings; multiple wounds; recent weight loss)  Fluid Accumulation: RD NICOLA    Interventions(treatment strategy):  Nutrition prescription-EN initiation   Collaboration with other providers    Nutrition Diagnosis Status:  New

## 2020-12-14 NOTE — PLAN OF CARE
Reviewed POC with family. No ss of distress or adverse reactions to meds. LR @ 60. 1 unit of RBC'S transfused. 4 L Trach collar. Stoma red. Iglesias in place. Will reassess hourly and as needed.   Problem: Fall Injury Risk  Goal: Absence of Fall and Fall-Related Injury  12/14/2020 0455 by Lucy Dugan RN  Outcome: Ongoing, Progressing  12/14/2020 0356 by Lucy Dugan RN  Outcome: Ongoing, Progressing     Problem: Adult Inpatient Plan of Care  Goal: Plan of Care Review  12/14/2020 0455 by Lucy Dugan RN  Outcome: Ongoing, Progressing  12/14/2020 0356 by Lucy Dugan RN  Outcome: Ongoing, Progressing  Goal: Patient-Specific Goal (Individualization)  12/14/2020 0455 by Lucy Dugan RN  Outcome: Ongoing, Progressing  12/14/2020 0356 by Lucy Dugan RN  Outcome: Ongoing, Progressing  Goal: Absence of Hospital-Acquired Illness or Injury  12/14/2020 0455 by Lucy Dugan RN  Outcome: Ongoing, Progressing  12/14/2020 0356 by Lucy Dugan RN  Outcome: Ongoing, Progressing  Goal: Optimal Comfort and Wellbeing  12/14/2020 0455 by Lucy Dugan RN  Outcome: Ongoing, Progressing  12/14/2020 0356 by Luyc Dugan RN  Outcome: Ongoing, Progressing  Goal: Readiness for Transition of Care  12/14/2020 0455 by Lucy Dugan RN  Outcome: Ongoing, Progressing  12/14/2020 0356 by Lucy Dugan RN  Outcome: Ongoing, Progressing  Goal: Rounds/Family Conference  12/14/2020 0455 by Lucy Dugan RN  Outcome: Ongoing, Progressing  12/14/2020 0356 by Lucy uDgan RN  Outcome: Ongoing, Progressing     Problem: Fluid Imbalance (Pneumonia)  Goal: Fluid Balance  12/14/2020 0455 by Lucy Dugan RN  Outcome: Ongoing, Progressing  12/14/2020 0356 by Lucy Dugan RN  Outcome: Ongoing, Progressing     Problem: Infection (Pneumonia)  Goal: Resolution of Infection Signs/Symptoms  12/14/2020 0455 by Lucy Dugan RN  Outcome: Ongoing, Progressing  12/14/2020 0356 by Lucy Dugan RN  Outcome: Ongoing,  Progressing     Problem: Respiratory Compromise (Pneumonia)  Goal: Effective Oxygenation and Ventilation  12/14/2020 0455 by Lucy Dugan RN  Outcome: Ongoing, Progressing  12/14/2020 0356 by Lucy Dugan RN  Outcome: Ongoing, Progressing     Problem: Infection  Goal: Infection Symptom Resolution  12/14/2020 0455 by Lucy Dugan RN  Outcome: Ongoing, Progressing  12/14/2020 0356 by Lucy uDgan RN  Outcome: Ongoing, Progressing     Problem: Wound  Goal: Optimal Wound Healing  12/14/2020 0455 by Lucy Dugan RN  Outcome: Ongoing, Progressing  12/14/2020 0356 by Lucy Dugan RN  Outcome: Ongoing, Progressing     Problem: Adjustment to Illness (Sepsis/Septic Shock)  Goal: Optimal Coping  12/14/2020 0455 by Lucy Dugan RN  Outcome: Ongoing, Progressing  12/14/2020 0356 by Lucy Dugan RN  Outcome: Ongoing, Progressing     Problem: Bleeding (Sepsis/Septic Shock)  Goal: Absence of Bleeding  12/14/2020 0455 by Lucy Dugan RN  Outcome: Ongoing, Progressing  12/14/2020 0356 by Lucy Dugan RN  Outcome: Ongoing, Progressing     Problem: Glycemic Control Impaired (Sepsis/Septic Shock)  Goal: Blood Glucose Level Within Desired Range  12/14/2020 0455 by Lucy Dugan RN  Outcome: Ongoing, Progressing  12/14/2020 0356 by Lucy Dugan RN  Outcome: Ongoing, Progressing     Problem: Hemodynamic Instability (Sepsis/Septic Shock)  Goal: Effective Tissue Perfusion  12/14/2020 0455 by Lucy Dugan RN  Outcome: Ongoing, Progressing  12/14/2020 0356 by Lucy Dugan RN  Outcome: Ongoing, Progressing     Problem: Infection (Sepsis/Septic Shock)  Goal: Absence of Infection Signs/Symptoms  12/14/2020 0455 by Lucy Dugan RN  Outcome: Ongoing, Progressing  12/14/2020 0356 by Lucy Dugan RN  Outcome: Ongoing, Progressing     Problem: Nutrition Impaired (Sepsis/Septic Shock)  Goal: Optimal Nutrition Intake  12/14/2020 0455 by Lucy Dugan RN  Outcome: Ongoing, Progressing  12/14/2020 0356 by  Lucy Dugan RN  Outcome: Ongoing, Progressing     Problem: Respiratory Compromise (Sepsis/Septic Shock)  Goal: Effective Oxygenation and Ventilation  12/14/2020 0455 by Lucy Dugan RN  Outcome: Ongoing, Progressing  12/14/2020 0356 by Lucy Dugan RN  Outcome: Ongoing, Progressing     Problem: Skin Injury Risk Increased  Goal: Skin Health and Integrity  12/14/2020 0455 by Lucy Dugan RN  Outcome: Ongoing, Progressing  12/14/2020 0356 by Lucy Dugan RN  Outcome: Ongoing, Progressing

## 2020-12-14 NOTE — CONSULTS
"   12/14/20 1100   Pain/Comfort/Sleep   Preferred Pain Scale FACES (Armstrong-Vasquez FACES Pain Rating Scale)   Comfort/Acceptable Pain Level 0   FACES Pain Rating: Rest 0-->no hurt   FACES Pain Rating: Activity 0-->no hurt   Cognitive   Level of Consciousness (AVPU) alert   ECG   Rhythm sinus tachycardia   Gastrointestinal   GI WDL ex   Abdominal Appearance rounded        Colostomy 10/29/20 1349 Descending/sigmoid LLQ   Placement Date/Time: 10/29/20 1349   Present Prior to Hospital Arrival?: No  Inserted by: MD  Colostomy Type: (c) Descending/sigmoid  Location: LLQ  Additional Comments: colostomy revision on 12/8/20  stoma size approx. 1.5 inches   Stomal Appliance 1 piece;Clean;Dry;Intact   Stoma Appearance red;moist;round   Site Assessment Clean;Intact   Output (mL) 250 mL        Urethral Catheter 11/13/20 1230 Latex 16 Fr.   Placement Date/Time: 11/13/20 1230   Hand Hygiene: Performed  Inserted by: RN   Name: CANDI Busch RN  Name of 2nd Person Present for Insertion: ALLEN Teague RN  Insertion attempts (enter comment if more than 2 attempts): 1  Catheter Type: Latex  T...   Site Assessment Clean;Intact   Collection Container Urimeter   Securement Method secured to top of thigh w/ adhesive device   Catheter Care Performed yes   Reason for Continuing Urinary Catheterization Non-healing sacral/perineal wound   CAUTI Prevention Bundle StatLock in place w 1" slack;Intact seal between catheter & drainage tubing;Drainage bag/urimeter off the floor;Green sheeting clip in use;No dependent loops or kinks;Drainage bag/urimeter not overfilled (<2/3 full);Drainage bag/urimeter below bladder   Skin   Skin WDL ex   Skin Color/Characteristics redness blanchable   Skin Temperature warm   Skin Moisture dry   Skin Elasticity slow return to original state   Skin Integrity wound   Specialty Bed/Overlay   (ordered GRAY immerse bed at this time)   Bed Support Surface Assessed   Jerry Risk Assessment   Sensory Perception 1-->completely " limited   Moisture 2-->very moist   Activity 1-->bedfast   Mobility 1-->completely immobile   Nutrition 1-->very poor   Friction and Shear 1-->problem   Jerry Score 7        Altered Skin Integrity 10/23/20 Right Ischial tuberosity Full thickness tissue loss with exposed bone, tendon, or muscle. Often includes undermining and tunneling. May extend into muscle and/or supporting structures.   Date First Assessed: 10/23/20   Altered Skin Integrity Present on Admission: yes  Side: Right  Location: Ischial tuberosity  Description of Altered Skin Integrity: Full thickness tissue loss with exposed bone, tendon, or muscle. Often includes undermi...   Wound Image    Description of Altered Skin Integrity Full thickness tissue loss with exposed bone, tendon, or muscle. Often includes undermining and tunneling. May extend into muscle and/or supporting structures.   Dressing Appearance Intact;Moist drainage   Drainage Amount Moderate   Drainage Characteristics/Odor Green;Serous;Malodorous   Appearance Bone;Red;Granulating;Moist   Tissue loss description Full thickness   Periwound Area Redness;Satellite lesion   Wound Length (cm) 5 cm   Wound Width (cm) 6.5 cm   Wound Depth (cm) 2.5 cm   Wound Volume (cm^3) 81.25 cm^3   Wound Surface Area (cm^2) 32.5 cm^2   Undermining (depth (cm)/location) 3cm frm 9-1 o'clock   Care Cleansed with:;Wound cleanser;Applied:;Skin Barrier   Dressing Gauze, wet to moist;Foam  (VASHE)        Altered Skin Integrity 10/23/20 Left Ischial tuberosity Full thickness tissue loss with exposed bone, tendon, or muscle. Often includes undermining and tunneling. May extend into muscle and/or supporting structures.   Date First Assessed: 10/23/20   Altered Skin Integrity Present on Admission: yes  Side: Left  Location: Ischial tuberosity  Description of Altered Skin Integrity: Full thickness tissue loss with exposed bone, tendon, or muscle. Often includes undermin...   Wound Image    Description of Altered Skin  Integrity Full thickness tissue loss with exposed bone, tendon, or muscle. Often includes undermining and tunneling. May extend into muscle and/or supporting structures.   Dressing Appearance Intact;Moist drainage   Drainage Amount Moderate   Drainage Characteristics/Odor Green;Serous;Malodorous   Appearance Bone;Red;Granulating;Moist   Tissue loss description Full thickness   Periwound Area Satellite lesion;Redness   Wound Edges Open   Wound Length (cm) 4 cm   Wound Width (cm) 6 cm   Wound Depth (cm) 2.5 cm   Wound Volume (cm^3) 60 cm^3   Wound Surface Area (cm^2) 24 cm^2   Undermining (depth (cm)/location) 3cm from 9-3 o'clock   Care Cleansed with:;Wound cleanser;Applied:;Skin Barrier   Dressing Gauze, wet to moist;Foam  (VASHE)        Altered Skin Integrity 11/13/20 Scrotum Full thickness tissue loss. Subcutaneous fat may be visible but bone, tendon or muscle are not exposed   Date First Assessed: 11/13/20   Altered Skin Integrity Present on Admission: yes  Location: Scrotum  Description of Altered Skin Integrity: Full thickness tissue loss. Subcutaneous fat may be visible but bone, tendon or muscle are not exposed   Description of Altered Skin Integrity Full thickness tissue loss. Subcutaneous fat may be visible but bone, tendon or muscle are not exposed  (healing)   Dressing Appearance Open to air   Drainage Amount Scant   Drainage Characteristics/Odor Serous   Appearance Red;Moist   Tissue loss description Full thickness   Care Cleansed with:;Sterile normal saline;Applied:;Skin Barrier        Altered Skin Integrity 11/13/20 Right medial Thigh Partial thickness tissue loss. Shallow open ulcer with a red or pink wound bed, without slough. Intact or Open/Ruptured Serum-filled blister.   Date First Assessed: 11/13/20   Altered Skin Integrity Present on Admission: yes  Side: Right  Orientation: medial  Location: Thigh  Description of Altered Skin Integrity: Partial thickness tissue loss. Shallow open ulcer with a  red or pink wound bed,...   Description of Altered Skin Integrity Partial thickness tissue loss. Shallow open ulcer with a red or pink wound bed, without slough. Intact or Open/Ruptured Serum-filled blister.   Dressing Appearance Open to air   Drainage Amount Scant   Drainage Characteristics/Odor Serous   Appearance Red;Moist   Tissue loss description Partial thickness   Periwound Area Intact   Wound Edges Open   Wound Length (cm) 2 cm   Wound Width (cm) 3 cm   Wound Depth (cm) 0.1 cm   Wound Volume (cm^3) 0.6 cm^3   Wound Surface Area (cm^2) 6 cm^2   Care Cleansed with:;Sterile normal saline;Applied:;Skin Barrier        Altered Skin Integrity 12/14/20   Buttocks Moisture associated dermatitis   Date First Assessed: 12/14/20   Altered Skin Integrity Present on Admission: yes  Side: (c)   Orientation: (c)   Location: (c) Buttocks  Primary Wound Type: Moisture associated dermatitis   Wound Image    Dressing Appearance Open to air   Drainage Amount None   Appearance Red   Tissue loss description Not applicable   Periwound Area Satellite lesion   Wound Edges Irregular   Care Cleansed with:;Soap and water;Applied:;Skin Barrier  (antifungal per MAR)   Skin Interventions   Pressure Reduction Devices pressure-redistributing mattress utilized;positioning supports utilized;heel offloading device utilized;foam padding utilized   Pressure Reduction Techniques frequent weight shift encouraged;heels elevated off bed;positioned off wounds   Skin Protection adhesive use limited;incontinence pads utilized;skin sealant/moisture barrier applied;tubing/devices free from skin contact   Safety Management   Safety Promotion/Fall Prevention side rails raised x 2;family to remain at bedside   Daily Care   Activity Management rolling - L1     Consulted on this 24 y/o M patient due to present on admission skin breakdown and ostomy. Patient is well known to wound care team. 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 He has been at [facility] since last discharge. Full skin assessment completed with mother at bedside.    MAD - moisture associated dermatitis with yeast/fungal infection noted to "diaper area" including bilateral buttock, perineum, bilateral groins, bilateral medial thighs, hips, lower abdomen, and extending down to right popliteal fold- redness, irregular borders, satellite lesions. Miconazole ointment/moisture barrier in use per MAR to affected areas. Interdry cut and applied to right popliteal fold for moisture wicking as well.  Scrotal and penile edema noted, healing old stage 3 pressure injury to scrotum again noted, nearly re-epithelialized, moist red wound bed. Barrier ointment in use.  Stage 2 pressure injury noted to right medial thigh measuring 2x3x0.1cm, moist red wound bed, moisture barrier ointment in use.   Bilateral heels and ankles intact with mild blanchable redness, no breakdown.   Abdomen round, mildly distended. LLQ colostomy noted with moist red round stoma, one piece flat ostomy pouch intact with no leak. Emptied 250 mL thick liquid stool at this time. LUQ PEG tube with dressing c,d,i. Mild rodrigo-stube masd noted, moisture barrier applied. Full thickness ulceration noted to midline abdomen, unknown etiology but potentially from prior abominal surgery. Measures 1.5x0.5x0.2cm, moist yellow and red subcutaneous tissue to wound bed. Cleansed with saline and foam dressing applied.  Previously noted stage 2 pressure injuries to bilateral proximal ears healing well, nearly re-surfaced. Left SHAWANDA.  Turned with assistance to right semi-prone position. Gauze dressing removed from bilateral buttock/ischium, moderate amount drainage noted, appears serous and lime green on gauze with odor., swab culture taken. Yeast MAD again noted to bilateral buttocks as described " above. Partial thickness tissue loss to gluteal cleft/sacral region also noted, measures 3x1x0.1cm, moist red wound bed, unsure if related to MAD vs. Pressure injury stage 2.   Stage 4 pressure injury again noted to right ischium measures 5x6.5x2.5cm, moist red granulating wound bed with exposed bone, undermining noted from 9-1 o'clock extending up to 3cm. Purple/maroon discoloration is noted to deeper undermined portion of wound and wound edge overlying this, at 10-11 o'clock.  Stage 4 pressure injury again noted to left ischium measures 4x6x2.5cm, moist red granulating wound bed with exposed bone, undermining noted from 9-3 o'clock extending up to 3cm.   Due to suspected pseudomonas infection to wound and yeast infection to rodrigo-wound skin, recommend wound vac holiday at this time, treat topically, and re-assess later this week for ability to re-apply wound vac.  Cleansed all with VASHE wound solution, patted dry. Rodrigo wound skin painted with cavilon. Wounds filled with vashe soaked 4x4 gauze and secured with foam dressings. Antifungal moisture barrier ointment to rodrigo wound skin per MAR.  Patient then positioned on left side with foam wedge, heels floated. Care provided and realistic findings reviewed with mother at bedside. Discussed likely pseudomonas infection of wounds, yeast infection of skin. She asked how long until his wounds are healed, and I explained that I can not guess how long that may take, nor can I promise her that the wounds will ever heal. I explained the importance of nutrition, offloading, and infection control in healing.   Recommend specialty Low Air Loss Immerse bed for patient (will order from [employer]). Continue to turn q2h with foam wedge, float heels, pad under medical devices.  Wound care recommendations:    Stage 4 pressure injuries to bilateral ischiums:  1. Cleanse with VASHE wound solution (at bedside or from )  2. Pat dry  3. Paint rodrigo wound skin with cavilon  4. Fill  "wounds with vashe soaked 4x4 gauze  5. Secure with foam dressing or bordered gauze dressing  6. Change twice daily    MAD to diaper area:  1. Cleanse with bath wipes dently  2. Apply Antifungal mositure barrier ointment per MAR BID    LLQ colostomy:  Please review Dedra's procedure "Pouching : Colostomy or Ileostomy" for instructions on ostomy.stoma care. Change ostomy appliance weekly or IMMEDIATELY IF LEAKING. All ostomy care supplies can be requested from [facility] management.    OSTOMY CARE SUPPLIES:  One Piece Flat [place] Pouch #[id number]   Cavilon Spray #[id number]                   "

## 2020-12-14 NOTE — SUBJECTIVE & OBJECTIVE
Interval History: Patient with NSEMI on EKG and persistent tachycardia overnight. No further melanotic stools but increased LFTs on labs. GI rec imaging. palliative care consulted.    Medications:  Continuous Infusions:   lactated ringers 60 mL/hr at 12/13/20 1828     Scheduled Meds:   enalaprilat  1.25 mg Intravenous Q6H    linezolid  600 mg Intravenous Q12H    meropenem (MERREM) IVPB  500 mg Intravenous Q6H    miconazole nitrate 2%   Topical (Top) BID    mupirocin   Nasal BID    pantoprazole  40 mg Intravenous BID    phenobarbital  100.1 mg Intravenous QHS    sodium chloride 0.9%  500 mL Intravenous Once     PRN Meds:sodium chloride, ondansetron, sodium chloride 0.9%     Review of patient's allergies indicates:   Allergen Reactions    Morphine sulfate Hives     Objective:     Vital Signs (Most Recent):  Temp: 97.8 °F (36.6 °C) (12/14/20 1053)  Pulse: (!) 154 (12/14/20 1100)  Resp: 18 (12/14/20 1053)  BP: (!) 186/93 (12/14/20 1053)  SpO2: 99 % (12/14/20 1053) Vital Signs (24h Range):  Temp:  [97.4 °F (36.3 °C)-99 °F (37.2 °C)] 97.8 °F (36.6 °C)  Pulse:  [110-154] 154  Resp:  [17-32] 18  SpO2:  [97 %-100 %] 99 %  BP: (120-186)/() 186/93     Weight: 38.1 kg (83 lb 14.4 oz)  Body mass index is 20.23 kg/m².    Intake/Output - Last 3 Shifts       12/12 0700 - 12/13 0659 12/13 0700 - 12/14 0659 12/14 0700 - 12/15 0659    Blood  272.5     Other  200     Total Intake(mL/kg)  472.5 (12.4)     Urine (mL/kg/hr)  500     Stool  175 250    Total Output  675 250    Net  -202.5 -250           Stool Occurrence  1 x           Physical Exam  Constitutional:       Comments: Chronically ill  Trach and peg dependent   Abdominal:      General: There is distension (mild).      Comments: PEG in place  Ostomy viable with nonbloody stool in appliance  Surgical incision healing well   Genitourinary:     Comments: Scrotal swelling  Iglesias in place  Musculoskeletal:      Comments: contracture   Skin:     General: Skin is warm.          Significant Labs:  CBC:   Recent Labs   Lab 12/14/20  0703   WBC 15.87*   RBC 3.48*   HGB 9.8*   HCT 31.9*   *   MCV 92   MCH 28.2   MCHC 30.7*     BMP:   Recent Labs   Lab 12/14/20  0703   *      K 3.6      CO2 23   BUN 36*   CREATININE 0.7   CALCIUM 7.8*   MG 2.0       Significant Diagnostics:  CT abd pending

## 2020-12-14 NOTE — ASSESSMENT & PLAN NOTE
Underlying complicating factor to current medical course.  He at baseline is nonverbal and minimally responsive.  Discussion with mom and also have consulted palliative care.  Continues to be full code.

## 2020-12-14 NOTE — PLAN OF CARE
GGT resulted very high at 2,261 to confirm high ALP of 1,622 is hepatobiliary in origin.  Gallbladder is surgically absent.  Abdominal XR also resulted showing severe central gaseous distension but insufficient qualifying features.  Patient has steady tachycardia and normal blood pressure.  High WBC count and afebrile.  Minimally responsive and abdomen is tender.  No bowel sounds.  Ordered CT abdomen with oral and IV contrast.

## 2020-12-14 NOTE — PROGRESS NOTES
[facility] -   Cardiology  Progress Note    Patient Name: Glen Moscoso  MRN: [id number]  Admission Date: 12/13/2020  Hospital Length of Stay: 1 days  Code Status: Full Code   Attending Physician: Layo Beck DO   Primary Care Physician: Yadi Lawson MD  Expected Discharge Date:   Principal Problem:<principal problem not specified>    Subjective:   HPI     This is a 26 yo male with PMHx of cerebal palsy, nonverbal, chronic resp failure, trach dependent, chronic anemia, peg tube placement, and prior colostomy placement for fecal diversion due to 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 Ortez on 12/08/2020 due to colostomy prolapse) due to bilateral sacral decubitus ulcers. He is a resident of [facility] where he was sent from today for reports of tachycardia, coffee ground emesis,  and dark/black stool noted coming from his colostomy. His stool for occult blood is positive and he has had a noted two  point drop in his hgb and hct  from 3 days ago. His BP has been stable.  Patient is being placed in hospital for further evaluation and treatment including GI and Surgery consult, Iv Abx for sepsis, and close monitoring of his GI bleed.     Cardiology consult r/o STEMI  EKG on 12/13/2020 11am showed NSR  EKG no 12/13/2020 259 pm showed NSR, ST elevation on II, III and avF. Some mild elevation on AVL. On lead II strip, some beats with normal STT segment,  EKG on 5 pm. NSR St elevation decreased with TWI        Hospital Course:   12/14/2020-Patient seen and examined in room, lying in bed. Remains non-verbal today. Repeat EKG today without any ischemic changes. Will sign off, please re-consult if needed.     Interval History: no acute issues noted o/n. Will sign off, please re-consult if needed.     Review of Systems   Unable to perform ROS: patient nonverbal     Objective:     Vital Signs (Most Recent):  Temp: 97.8 °F  (36.6 °C) (12/14/20 1053)  Pulse: (!) 154 (12/14/20 1100)  Resp: 18 (12/14/20 1053)  BP: (!) 186/93 (12/14/20 1053)  SpO2: 99 % (12/14/20 1053) Vital Signs (24h Range):  Temp:  [97.4 °F (36.3 °C)-99 °F (37.2 °C)] 97.8 °F (36.6 °C)  Pulse:  [110-154] 154  Resp:  [17-32] 18  SpO2:  [97 %-100 %] 99 %  BP: (120-186)/() 186/93     Weight: 38.1 kg (83 lb 14.4 oz)  Body mass index is 20.23 kg/m².     SpO2: 99 %  O2 Device (Oxygen Therapy): Trach Collar      Intake/Output Summary (Last 24 hours) at 12/14/2020 1315  Last data filed at 12/14/2020 1100  Gross per 24 hour   Intake 472.5 ml   Output 925 ml   Net -452.5 ml       Lines/Drains/Airways     Central Venous Catheter Line            Tunneled Central Line Insertion/Assessment - Double Lumen  09/28/20 1400 right subclavian 77 days          Drain                 Gastrostomy/Enterostomy 08/04/20 1653 Percutaneous endoscopic gastrostomy (PEG) feeding 131 days         Gastrostomy/Enterostomy 10/22/20 1300 LUQ 53 days         Colostomy 10/29/20 1349 Descending/sigmoid LLQ 46 days         Urethral Catheter 11/13/20 1230 Latex 16 Fr. 31 days         Urethral Catheter 12/06/20 1207 Latex 16 Fr. 8 days          Airway                 Surgical Airway Shiley Cuffed -- days         Surgical Airway 10/27/20 0855 Shiley Cuffed 48 days         Airway - Non-Surgical 12/08/20 0924 Endotracheal Tube 6 days                Physical Exam   Constitutional: He is oriented to person, place, and time. He appears well-developed and well-nourished. No distress.   HENT:   Head: Normocephalic and atraumatic.   Neck: Normal range of motion and full passive range of motion without pain. Neck supple. No JVD present.   Cardiovascular: Regular rhythm, S1 normal, S2 normal and intact distal pulses. Tachycardia present. PMI is not displaced. Exam reveals no distant heart sounds.   No murmur heard.  Pulses:       Radial pulses are 2+ on the right side and 2+ on the left side.        Dorsalis pedis  pulses are 2+ on the right side and 2+ on the left side.   Remains in ST   Pulmonary/Chest: Effort normal. No accessory muscle usage. No respiratory distress. He has decreased breath sounds in the right lower field and the left lower field. He has no wheezes. He has no rales.   +trach   Abdominal:   +peg   Musculoskeletal: Normal range of motion.         General: No edema.      Right ankle: He exhibits swelling.      Left ankle: He exhibits swelling.      Comments: +scrotal edema   Neurological: He is alert and oriented to person, place, and time.   Skin: Skin is warm and dry. He is not diaphoretic. No cyanosis. Nails show no clubbing.   Psychiatric: He has a normal mood and affect. His speech is normal and behavior is normal. Judgment and thought content normal. Cognition and memory are normal.   Nursing note and vitals reviewed.      Significant Labs:   BMP:   Recent Labs   Lab 12/13/20  1216 12/14/20  0703   * 174*    143   K 3.9 3.6    108   CO2 26 23   BUN 46* 36*   CREATININE 0.7 0.7   CALCIUM 7.8* 7.8*   MG 2.2 2.0   , CBC   Recent Labs   Lab 12/13/20  1216  12/14/20  0703   WBC 16.90*  --  15.87*   HGB 8.6*  --  9.8*   HCT 28.9*   < > 31.9*   *  --  362*    < > = values in this interval not displayed.   , Troponin   Recent Labs   Lab 12/13/20  2148 12/14/20  0220 12/14/20  0703   TROPONINI 0.112* <0.006 <0.006    and All pertinent lab results from the last 24 hours have been reviewed.    Significant Imaging: Echocardiogram:   Transthoracic echo (TTE) complete (Cupid Only):   Results for orders placed or performed during the hospital encounter of 09/20/20   Echo Color Flow Doppler? Yes   Result Value Ref Range    Ascending aorta 2.33 cm    STJ 2.54 cm    AV mean gradient 2 mmHg    Ao peak josé antonio 0.92 m/s    Ao VTI 13.79 cm    IVRT 70.41 msec    IVS 0.84 0.6 - 1.1 cm    LA size 2.26 cm    Left Atrium Major Axis 4.46 cm    Left Atrium Minor Axis 4.77 cm    LVIDd 3.90 3.5 - 6.0 cm    LVIDs  3.22 2.1 - 4.0 cm    LVOT diameter 2.10 cm    LVOT peak VTI 11.78 cm    Posterior Wall 0.58 (A) 0.6 - 1.1 cm    MV Peak A Jaren 0.75 m/s    E wave decelartion time 81.19 msec    MV Peak E Jaren 0.73 m/s    RA Major Axis 3.43 cm    RA Width 2.54 cm    RVDD 2.92 cm    Sinus 2.79 cm    TAPSE 2.36 cm    TR Max Jaren 2.42 m/s    TDI LATERAL 0.12 m/s    TDI SEPTAL 0.13 m/s    LA WIDTH 2.90 cm    MV stenosis pressure 1/2 time 23.55 ms    LV Diastolic Volume 65.79 mL    LV Systolic Volume 41.58 mL    RV S' 14.85 cm/s    LVOT peak jaren 0.76 m/s    LV LATERAL E/E' RATIO 6.08 m/s    LV SEPTAL E/E' RATIO 5.62 m/s    FS 17 %    LA volume 25.68 cm3    LV mass 76.52 g    Left Ventricle Relative Wall Thickness 0.30 cm    AV valve area 2.96 cm2    AV Velocity Ratio 0.83     AV index (prosthetic) 0.85     MV valve area p 1/2 method 9.34 cm2    E/A ratio 0.97     Mean e' 0.13 m/s    LVOT area 3.5 cm2    LVOT stroke volume 40.78 cm3    AV peak gradient 3 mmHg    E/E' ratio 5.84 m/s    LV Systolic Volume Index 35.7 mL/m2    LV Diastolic Volume Index 56.47 mL/m2    LA Volume Index 22.0 mL/m2    LV Mass Index 66 g/m2    Triscuspid Valve Regurgitation Peak Gradient 23 mmHg    BSA 1.15 m2    Right Atrial Pressure (from IVC) 3 mmHg    TV rest pulmonary artery pressure 26 mmHg    Narrative    · The left ventricle is normal in size with low normal systolic function.   The estimated ejection fraction is 50%.  · Normal left ventricular diastolic function.  · Normal right ventricular systolic function.  · Mild tricuspid regurgitation.  · The estimated PA systolic pressure is 26 mmHg.  · Normal central venous pressure (3 mmHg).  · Tachcardia        Assessment and Plan:       Melena  GI on board    Abnormal EKG  Dynamical ST segment elevation on EKG    -coronary spasm vs artifact caused by tachycardia  -on indication for Heparin gtt and ASA rx due to ongoing GI bleeding and anemia  -continue medical Rx    12/14/2020  -Repeat EKG today with no further ST  elevation  -Continue mgmt per primary team  -Will sign off, please re-consult if needed.     Sacral decubitus ulcer  Rx per HM    Gastrointestinal hemorrhage  Rx per HM    Cerebral palsy  Rx per HM        VTE Risk Mitigation (From admission, onward)         Ordered     IP VTE HIGH RISK PATIENT  Once      12/13/20 1726     Place SHANELLE hose  Until discontinued      12/13/20 1726     Place SHANELLE hose  Until discontinued      12/13/20 1658                Vernell Klein, LIZYP-C  Cardiology  [facility]

## 2020-12-14 NOTE — ASSESSMENT & PLAN NOTE
Hemoglobin with minimal change at this time (near baseline)  Monitor HGB and HCT q 6 hours  Patient to received 1 unit PRBCs thus far

## 2020-12-14 NOTE — PLAN OF CARE
CM spoke to patient's mother, Leni to complete the discharge planning assessment.  Patient came from [facility] in [place] and the plan is for patient to return there.  Patient is a total care and will require ambulance for transportation.  CM also spoke to Luz at [facility] and they did confirm that patient was at the LTAC.  Choice form completed for return to [facility].  Referral made in right care.  CM will continue to follow.  CM provided a transitional care folder, information on advanced directives, information on pharmacy bedside delivery, and discharge planning begins on admission with contact information for any needs/questions.     D/C Plan: [facility]  PCP: Dr Lawson  Preferred Pharmacy: [facility]  Discharge transportation: Ambulance  [facility]: Active  Pharmacy Bedside Delivery: No, placement     12/14/20 4499   Discharge Assessment   Assessment Type Discharge Planning Assessment   Confirmed/corrected address and phone number on facesheet? Yes   Assessment information obtained from? Patient;Medical Record   Expected Length of Stay (days)   (TBD)   Communicated expected length of stay with patient/caregiver yes   Prior to hospitilization cognitive status: Unable to Assess   Prior to hospitalization functional status: Completely Dependent   Current cognitive status: Unable to Assess   Current Functional Status: Completely Dependent   Facility Arrived From: [facility]   Lives With parent(s)   Able to Return to Prior Arrangements yes   Is patient able to care for self after discharge? No   Who are your caregiver(s) and their phone number(s)? Leni Moscoso, mother [phone]   Patient's perception of discharge disposition long-term acute care facility (LTAC)   Readmission Within the Last 30 Days previous discharge plan unsuccessful   If yes, most recent facility name: [facility]   Patient currently being followed by outpatient case management? No   Patient currently receives any other outside  agency services? No   Equipment Currently Used at Home wheelchair;hospital bed   Do you have any problems affording any of your prescribed medications? No   Is the patient taking medications as prescribed? yes   Does the patient have transportation home? Yes   Transportation Anticipated   (Ambulance)   Dialysis Name and Scheduled days NA   Does the patient receive services at [facility]? No   Discharge Plan A Long-term acute care facility (LTAC)   Discharge Plan B Long-term acute care facility (LTAC)   DME Needed Upon Discharge  none   Patient/Family in Agreement with Plan yes

## 2020-12-14 NOTE — CONSULTS
[facility] -   Gastroenterology  Consult Note    Patient Name: Glen Moscoso  MRN: [id number]  Admission Date: 12/13/2020  Hospital Length of Stay: 1 days  Code Status: Full Code   Attending Provider: Layo Beck DO   Consulting Provider: Kyle Ferrell PA-C  Primary Care Physician: Yadi Lawson MD  Principal Problem:<principal problem not specified>    Inpatient consult to Gastroenterology  Consult performed by: Kyle Ferrell PA-C  Consult ordered by: Anahi Herrera MD  Reason for consult: GI bleed        Subjective:     HPI:  The patient was transferred from [facility] for coffee ground emesis, melena and trach drainage. The patient has CP and non-verbal. His mother is with him but has no details of current symptoms. The patient had a colostomy revision 12/08/20 by Dr. Ortez. He was discharged with a Hgb of 10.4 after transfusion. His baseline is around 9. On this admit, it was 8.6. He received a unit of blood and repeat Hgb pending. The patient also had Dynamical ST segment elevation on EKG. Cardiology was consulted and felt it was coronary spasm vs artifact caused by tachycardia. The patient's ostomy output is currently brown. PEG was flushed and non-bloody per report. The patient is also noted to have significant elevation in ALP (1,600) which is new. T. Bili and ALT are normal. AST mildly elevated. GGT 2,000. Ultrasound was unrevealing. He was started on Zyvox last admit.       Past Medical History:   Diagnosis Date    Acid reflux     Anemia of chronic disease 8/30/2020    Cerebral palsy     Colitis     Decubitus ulcer of ischial area, left, stage IV 9/10/2020    History of hip surgery     Osteomyelitis of pelvis 9/24/2020    Pneumoperitoneum 9/21/2020    Pressure injury of right ischium, stage 4     S/P percutaneous endoscopic gastrostomy (PEG) tube placement     Seizure disorder 8/26/2020    Seizures     Severe protein-calorie malnutrition     Sinus tachycardia  8/4/2020       Past Surgical History:   Procedure Laterality Date    CHOLECYSTECTOMY      FLEXIBLE SIGMOIDOSCOPY N/A 9/9/2020    Procedure: SIGMOIDOSCOPY, FLEXIBLE;  Surgeon: America Goode MD;  Location: [address]);  Service: Endoscopy;  Laterality: N/A;    HIP SURGERY      REVISION COLOSTOMY N/A 12/8/2020    Procedure: REVISION, COLOSTOMY;  Surgeon: Michael Ortez MD;  Location: [facility] OR;  Service: General;  Laterality: N/A;  with partial colectomy    TRACHEOSTOMY N/A 10/27/2020    Procedure: CREATION, TRACHEOSTOMY;  Surgeon: Kar Palma MD;  Location: [facility] OR;  Service: ENT;  Laterality: N/A;       Review of patient's allergies indicates:   Allergen Reactions    Morphine sulfate Hives     Family History     Problem Relation (Age of Onset)    Cancer Maternal Grandfather        Tobacco Use    Smoking status: Never Smoker    Smokeless tobacco: Never Used   Substance and Sexual Activity    Alcohol use: Never     Frequency: Never    Drug use: Not Currently    Sexual activity: Never     Review of Systems   Unable to perform ROS: Patient nonverbal     Objective:     Vital Signs (Most Recent):  Temp: 98.4 °F (36.9 °C) (12/14/20 0700)  Pulse: (!) 130 (12/14/20 0700)  Resp: 20 (12/14/20 0700)  BP: (!) 130/90 (12/14/20 0700)  SpO2: 97 % (12/14/20 0700) Vital Signs (24h Range):  Temp:  [97.4 °F (36.3 °C)-99.4 °F (37.4 °C)] 98.4 °F (36.9 °C)  Pulse:  [110-140] 130  Resp:  [17-32] 20  SpO2:  [97 %-100 %] 97 %  BP: (120-148)/() 130/90     Weight: 38.1 kg (83 lb 14.4 oz) (12/13/20 1207)  Body mass index is 20.23 kg/m².      Intake/Output Summary (Last 24 hours) at 12/14/2020 0828  Last data filed at 12/14/2020 0600  Gross per 24 hour   Intake 472.5 ml   Output 675 ml   Net -202.5 ml       Lines/Drains/Airways     Central Venous Catheter Line            Tunneled Central Line Insertion/Assessment - Double Lumen  09/28/20 1400 right subclavian 76 days          Drain                  Gastrostomy/Enterostomy 08/04/20 1653 Percutaneous endoscopic gastrostomy (PEG) feeding 131 days         Gastrostomy/Enterostomy 10/22/20 1300 LUQ 52 days         Colostomy 10/29/20 1349 Loop LLQ 45 days         Urethral Catheter 11/13/20 1230 Latex 16 Fr. 30 days         Urethral Catheter 12/06/20 1207 Latex 16 Fr. 7 days          Airway                 Surgical Airway Shiley Cuffed -- days         Surgical Airway 10/27/20 0855 Shiley Cuffed 48 days         Airway - Non-Surgical 12/08/20 0924 Endotracheal Tube 5 days                Physical Exam  Constitutional:       General: He is awake.      Appearance: He is cachectic. He is ill-appearing.   HENT:      Head: Normocephalic.   Eyes:      Extraocular Movements: Extraocular movements intact.   Neck:      Trachea: Tracheostomy present.   Cardiovascular:      Rate and Rhythm: Regular rhythm. Tachycardia present.      Heart sounds: Normal heart sounds. No murmur.   Pulmonary:      Effort: Pulmonary effort is normal. No respiratory distress.      Breath sounds: Normal breath sounds. No wheezing.      Comments: Trach noted.  Abdominal:      General: Bowel sounds are decreased. There is distension.      Palpations: Abdomen is soft. There is no hepatomegaly or mass.      Tenderness: There is no abdominal tenderness.   Musculoskeletal:      Right lower leg: No edema.      Left lower leg: No edema.   Skin:     General: Skin is dry.      Findings: No rash.         Significant Labs:  CBC:   Recent Labs   Lab 12/13/20  1216  12/13/20  2257 12/14/20  0522 12/14/20  0703   WBC 16.90*  --   --   --  15.87*   HGB 8.6*  --   --   --  9.8*   HCT 28.9*   < > 35.0*  35.0* 50.7  50.7 31.9*   *  --   --   --  362*    < > = values in this interval not displayed.     CMP:   Recent Labs   Lab 12/14/20  0703   *   CALCIUM 7.8*   ALBUMIN 1.4*   PROT 5.5*      K 3.6   CO2 23      BUN 36*   CREATININE 0.7   ALKPHOS 1,874*   ALT 33   AST 68*   BILITOT 0.7      Coagulation:   Recent Labs   Lab 12/13/20  1216   INR 1.2   APTT <21.0       Significant Imaging:  Imaging results within the past 24 hours have been reviewed.    Assessment/Plan:     Melena  Reported melena but now resolved with brown ostomy output. PEG without overt blood.   Hgb dropped over the last few days but close to baseline. Awaiting Am labs.   Can consider EGD today, but yield likely low. Will discuss with General Surgery team.   Continue Protonix.   Monitor H/H.     Elevated alkaline phosphatase level  This is an acute change. US unrevealing. Zyvox does have elevated LFTs and lipase as a potential side effect.  Will monitor.   Agree with CT scan.     Abnormal EKG  Cardiology note reviewed.     Sepsis  Antibiotics per  team.     Hypertensive urgency  Management per  team and Cardiology.         Thank you for your consult. I will follow-up with patient. Please contact us if you have any additional questions.    Kyle Ferrell PA-C  Gastroenterology  [facility]

## 2020-12-14 NOTE — ASSESSMENT & PLAN NOTE
This patient does have evidence of infective focus  My overall impression is sepsis. Vital signs were reviewed and noted in progress note.  Antibiotics given-   Antibiotics (From admission, onward)    Start     Stop Route Frequency Ordered    12/13/20 2100  mupirocin 2 % ointment      12/18 2059 Nasl 2 times daily 12/13/20 1649 12/13/20 1800  meropenem-0.9% sodium chloride 500 mg/50 mL IVPB      -- IV Every 6 hours (non-standard times) 12/13/20 1649 12/13/20 1800  linezolid 600 mg/300 mL IVPB 600 mg      -- IV Every 12 hours (non-standard times) 12/13/20 1649        Cultures were taken-   Microbiology Results (last 7 days)     Procedure Component Value Units Date/Time    Blood culture x two cultures. Draw prior to antibiotics. [[id number]] Collected: 12/13/20 1530    Order Status: Completed Specimen: Blood from Peripheral, Antecubital, Left Updated: 12/14/20 0915     Blood Culture, Routine No Growth to date    Narrative:      Aerobic and anaerobic    Blood culture x two cultures. Draw prior to antibiotics. [[id number]] Collected: 12/13/20 1536    Order Status: Completed Specimen: Blood from Peripheral, Antecubital, Left Updated: 12/14/20 0915     Blood Culture, Routine No Growth to date    Narrative:      Aerobic and anaerobic    Culture, Respiratory with Gram Stain [[id number]] Collected: 12/13/20 1855    Order Status: Completed Specimen: Respiratory from Sputum Updated: 12/14/20 0417     Gram Stain (Respiratory) <10 epithelial cells per low power field.     Gram Stain (Respiratory) Few yeast     Gram Stain (Respiratory) Rare Gram negative rods     Gram Stain (Respiratory) Few WBC's        Latest lactate reviewed, they are-  Recent Labs   Lab 12/13/20  1223   LACTATE 1.5     Source- Suspected GI    Source control Achieved by- Iv Abx  ID, GI,  and General Surgery consulted

## 2020-12-14 NOTE — HOSPITAL COURSE
12/14:  Patient overall stable overnight however continues to be tachycardic.  Range of between 01/20/2040.  His alkaline phosphatase has trended upwards.  Appreciate gastroenterology recommendations.  No evidence of active bleeding at this time per PEG colostomy.  Getting CT abdomen and pelvis with contrast for further insight into alkaline phosphatase and GGT elevation.  Discussed case Dr. Ortez and GI Dr. Bautista. Consulted palliative care to discuss goals of care. Discussed with Amanda Colmenares (palliative) and mother does not want to consider what to do if his condition worsens    12/15:  Patient's condition continues being guarded.  Appreciate infectious disease recs.  Zyvox and fluconazole was stopped.  His CRP and ESR are high.  Following cultures.  Continues to be on Levaquin and Unasyn.  CT angiogram yesterday ruled out pulmonary embolism and however confirmed atelectasis some pulmonary effusion in the setting postoperative status.  Unfortunately does not have lot options for positive pressure as he cannot follow directions to use incentive spirometry and not able to get up and move around to mobilize fluid.  Discussed case with mother.  Continues to have elevated phosphatase.  No structural reason this imaging.  Will be going for upper endoscopy today.    12/16:  No significant change over night.  Patient does appear to be more alert and moving his eyes looking around today.  Mom has no new concerns. WBC stable from yesterday.  Heart rate appears to be similar stable around 130. Discussed with Infectious Disease who continues to follow patient and is watching for culture results.  No changes to the antibiotic therapy at this time.    12/17:  Heart rate has trended down over the last 24 hr which is reassuring.  However, since yesterday his oxygen requirement has increased.  Pending repeat chest x-ray this morning.  Sodium elevated and potassium low this morning.  Replacing potassium through G-tube and  rechecking sodium after increasing fluid rate.  He is only been getting 50 mL/hr.  Increasing this to 100 mL per hour and will repeat sodium potassium level 2:00 p.m. this afternoon.  Unable to discuss with mother as she was not present in the room today.  Will round later to speak with her.    12/18- Remains on O2 10L/min via  trach collar . No acute distress noted .No tachypnea.  Remains tachycardiac . Add Diltiazem per G tube along with Metoprolol. Tube feed held per Pulmonology and Lasix IV initiated . Repeat CXR in am to evaluate Left lung atelectasis/collapse.Consult RT for tracheal suctioning.  Hypernatremia , hypokalemia and hypomagnesemia again noted. Will replete accordingly . Gentle hydration continues while on Lasix for kathia pleural effusion. Current antibiotic - Unasyn and Levofloxacin.     12/19- Follow up CXR resulted marked improvement in pulmonary aeration in the left lung with near resolution of atelectasis . O2 down to 8L/min. ST on monitor . Increase Metoprolol to 50 mg bid . Resume tube feeds today . Wound vac to kathia ischium placed yesterday.  Antibiotic changed to Unasyn, Minocycline  and Bactrim per ID. Possible DC in 24 to 48h     12/20- Persistent sinus tachycardia but slightly better. Meds adjusted. Started tube feeds and tolerating . Stool is yellow and no signs of active bleed. H/H stable at 8.3/28. Remains on O2 8 to 10L/min via trach collar. Repeat CXR resulted near resolution of left lung atelectasis. Continue trach care and tracheal suctioning per RT.  Continue Antibiotic - Unasyn, Minocycline and Bactrim per ID recs. Pt is medically stable to return to LTAC.     12/21- Noted fever 102 last night . Blood cultures ordered. No further fever noted since . Continue current antibiotic . Slight drop in H/H noted . Will monitor. Will give iron sucrose injection. WBC mildly elevated.  Disposition- [facility]     12/22- Remains afebrile. Repeat blood cultures from 12/21 resulted NGTD. Antibiotic  Unasyn IV/ Minocycline and Bactrim per PEG tube continues . Wound vac kathia ischium in place . H/H resulted moderate anemia without further signs of active bleed. 1 unit of P-RBC transfused today  to optimize Hgb/Hct . Pt is examined and deemed stable to return to [facility] today.

## 2020-12-14 NOTE — SUBJECTIVE & OBJECTIVE
Past Medical History:   Diagnosis Date    Acid reflux     Anemia of chronic disease 8/30/2020    Cerebral palsy     Colitis     Decubitus ulcer of ischial area, left, stage IV 9/10/2020    History of hip surgery     Osteomyelitis of pelvis 9/24/2020    Pneumoperitoneum 9/21/2020    Pressure injury of right ischium, stage 4     S/P percutaneous endoscopic gastrostomy (PEG) tube placement     Seizure disorder 8/26/2020    Seizures     Severe protein-calorie malnutrition     Sinus tachycardia 8/4/2020       Past Surgical History:   Procedure Laterality Date    CHOLECYSTECTOMY      FLEXIBLE SIGMOIDOSCOPY N/A 9/9/2020    Procedure: SIGMOIDOSCOPY, FLEXIBLE;  Surgeon: America Goode MD;  Location: [address]);  Service: Endoscopy;  Laterality: N/A;    HIP SURGERY      REVISION COLOSTOMY N/A 12/8/2020    Procedure: REVISION, COLOSTOMY;  Surgeon: Michael Ortez MD;  Location: [facility];  Service: General;  Laterality: N/A;  with partial colectomy    TRACHEOSTOMY N/A 10/27/2020    Procedure: CREATION, TRACHEOSTOMY;  Surgeon: Kar Palma MD;  Location: [facility];  Service: ENT;  Laterality: N/A;       Review of patient's allergies indicates:   Allergen Reactions    Morphine sulfate Hives     Patient has tolerated morphine several times in the past.        Medications:  Medications Prior to Admission   Medication Sig    acetaminophen (TYLENOL) 325 MG tablet Take 325 mg by mouth every 4 (four) hours as needed for Pain.    arginine-glutamine-calcium HMB (CHEO) 7-7-1.5 gram PwPk Take by mouth 2 (two) times a day.    ascorbic acid, vitamin C, (VITAMIN C) 500 MG tablet 1 tablet (500 mg total) by Per G Tube route once daily.    baclofen (LIORESAL) 10 MG tablet 1 tablet (10 mg total) by Per G Tube route 3 (three) times daily.    bisacodyL (DULCOLAX) 10 mg Supp Place 1 suppository (10 mg total) rectally daily as needed (Until bowel movement if patient has no bowel movement for 2 days).     diphenhydrAMINE (BENADRYL) 12.5 mg/5 mL elixir 10 mLs (25 mg total) by Per G Tube route 4 (four) times daily as needed for Allergies.    gabapentin (NEURONTIN) 250 mg/5 mL solution 5 mLs (250 mg total) by Per G Tube route nightly. At bedtime    lactobacillus acidophilus & bulgar (LACTINEX) 100 million cell packet Take 1 tablet by mouth once daily.    linezolid (ZYVOX) 100 mg/5 mL SusR 30 mLs (600 mg total) by Per G Tube route every 12 (twelve) hours. for 5 days    melatonin (MELATIN) 3 mg tablet 2 tablets (6 mg total) by Per G Tube route nightly as needed for Insomnia.    miconazole nitrate 2% (MICOTIN) 2 % Oint Apply topically 2 (two) times daily. for 14 days    multivitamin liquid no.118 Liqd 10 mLs by Per G Tube route once daily.    PHENobarbitaL 20 mg/5 mL (4 mg/mL) Elix elixir 25 mLs (100 mg total) by Per G Tube route every evening.    potassium chloride (KLOR-CON) 20 mEq Pack DISSOLVE 2 TABLETS AS DIRECTED AND DRINK DAILY    propranoloL (INDERAL) 20 MG tablet 1 tablet (20 mg total) by Per G Tube route 3 (three) times daily. (Patient taking differently: 20 mg by Per G Tube route 4 (four) times daily. )     Antibiotics (From admission, onward)    Start     Stop Route Frequency Ordered    12/14/20 1630  ampicillin-sulbactam 3 g in sodium chloride 0.9 % 100 mL IVPB (ready to mix system)      -- IV Every 6 hours (non-standard times) 12/14/20 1521    12/14/20 1630  levoFLOXacin 750 mg/150 mL IVPB 750 mg      -- IV Every 24 hours (non-standard times) 12/14/20 1521    12/13/20 2100  mupirocin 2 % ointment      12/18 2059 Nasl 2 times daily 12/13/20 1649    12/13/20 1800  linezolid 600 mg/300 mL IVPB 600 mg      -- IV Every 12 hours (non-standard times) 12/13/20 1649        Antifungals (From admission, onward)    Start     Stop Route Frequency Ordered    12/14/20 1515  fluconazole 40 mg/ml suspension 200 mg      -- PER G TUBE Daily 12/14/20 1507    12/13/20 2100  miconazole nitrate 2% ointment      -- Top 2  times daily 12/13/20 1716        Antivirals (From admission, onward)    None           There is no immunization history for the selected administration types on file for this patient.    Family History     Problem Relation (Age of Onset)    Cancer Maternal Grandfather        Social History     Socioeconomic History    Marital status: Single     Spouse name: Not on file    Number of children: Not on file    Years of education: Not on file    Highest education level: Not on file   Occupational History    Not on file   Social Needs    Financial resource strain: Not on file    Food insecurity     Worry: Not on file     Inability: Not on file    Transportation needs     Medical: Not on file     Non-medical: Not on file   Tobacco Use    Smoking status: Never Smoker    Smokeless tobacco: Never Used   Substance and Sexual Activity    Alcohol use: Never     Frequency: Never    Drug use: Not Currently    Sexual activity: Never   Lifestyle    Physical activity     Days per week: Not on file     Minutes per session: Not on file    Stress: Not on file   Relationships    Social connections     Talks on phone: Not on file     Gets together: Not on file     Attends L[employer]-day service: Not on file     Active member of club or organization: Not on file     Attends meetings of clubs or organizations: Not on file     Relationship status: Not on file   Other Topics Concern    Not on file   Social History Narrative    Not on file     Review of Systems   Unable to perform ROS: Acuity of condition     Objective:     Vital Signs (Most Recent):  Temp: 100.1 °F (37.8 °C) (12/14/20 1623)  Pulse: (!) 144 (12/14/20 1623)  Resp: 18 (12/14/20 1623)  BP: 126/80 (12/14/20 1623)  SpO2: (!) 92 % (12/14/20 1623) Vital Signs (24h Range):  Temp:  [97.4 °F (36.3 °C)-100.1 °F (37.8 °C)] 100.1 °F (37.8 °C)  Pulse:  [114-154] 144  Resp:  [17-22] 18  SpO2:  [92 %-100 %] 92 %  BP: (120-186)/() 126/80     Weight: 38.1 kg (83 lb 14.4  oz)  Body mass index is 20.23 kg/m².    Estimated Creatinine Clearance: 88.4 mL/min (based on SCr of 0.7 mg/dL).    Physical Exam  Vitals signs and nursing note reviewed.   Constitutional:       General: He is not in acute distress.     Appearance: He is well-developed. He is ill-appearing. He is not diaphoretic.      Comments: At mental baseline   HENT:      Head: Normocephalic and atraumatic.      Nose: Nose normal.   Eyes:      General:         Right eye: No discharge.         Left eye: No discharge.      Conjunctiva/sclera: Conjunctivae normal.      Pupils: Pupils are equal, round, and reactive to light.      Comments: No purposeful eye movement     Neck:      Thyroid: No thyromegaly.      Trachea: Tracheostomy present.   Cardiovascular:      Rate and Rhythm: Regular rhythm. Tachycardia present.      Heart sounds: Normal heart sounds. No murmur.      Comments: Cool extremities  Pulmonary:      Effort: Pulmonary effort is normal. No respiratory distress.      Breath sounds: Normal breath sounds. No wheezing.   Abdominal:      General: There is no distension.      Palpations: Abdomen is soft.      Comments: PEG and colostomy in place. No evidence of bleeding   Genitourinary:     Comments: catheter  Skin:     General: Skin is warm.      Findings: No rash.   Neurological:      Mental Status: Mental status is at baseline.   Psychiatric:         Behavior: Behavior normal.                 Significant Labs:   Blood Culture:   Recent Labs   Lab 11/13/20  2332 12/06/20  1345 12/06/20  1405 12/13/20  1530 12/13/20  1536   LABBLOO No growth after 5 days.  Gram stain aer bottle: Gram positive cocci in chains resembling Strep   Results called to and read back by: Zee Taylor RN 11/14/2020  22:56  ENTEROCOCCUS FAECIUM VRE* No growth after 5 days. No growth after 5 days. No Growth to date No Growth to date     BMP:   Recent Labs   Lab 12/14/20  0703   *      K 3.6      CO2 23   BUN 36*   CREATININE 0.7    CALCIUM 7.8*   MG 2.0     CBC:   Recent Labs   Lab 12/13/20  1216  12/13/20  2257 12/14/20  0522 12/14/20  0703   WBC 16.90*  --   --   --  15.87*   HGB 8.6*  --   --   --  9.8*   HCT 28.9*   < > 35.0*  35.0* 50.7  50.7 31.9*   *  --   --   --  362*    < > = values in this interval not displayed.     CMP:   Recent Labs   Lab 12/13/20  1216 12/14/20  0703    143   K 3.9 3.6    108   CO2 26 23   * 174*   BUN 46* 36*   CREATININE 0.7 0.7   CALCIUM 7.8* 7.8*   PROT 5.9* 5.5*   ALBUMIN 1.4* 1.4*   BILITOT 0.5 0.7   ALKPHOS 1,622* 1,874*   AST 77* 68*   ALT 34 33   ANIONGAP 7* 12   EGFRNONAA >60 >60     Respiratory Culture:   Recent Labs   Lab 08/26/20  0505 08/31/20  2157 09/06/20  1103 10/03/20  1749 12/10/20  0514 12/13/20  1855   GSRESP <10 epithelial cells per low power field.  Moderate WBC's  Many Gram negative rods  Many Gram negative diplococci <10 epithelial cells per low power field.  Rare WBC's  Rare Gram negative rods >10 epithelial cells per low power field  Rare WBC's  Rare Gram negative rods <10 epithelial cells per low power field.  Rare WBC's  Rare budding yeast <10 epithelial cells per low power field.  Moderate WBC's  Moderate Gram negative rods  Rare Gram positive cocci  Rare Gram negative diplococci <10 epithelial cells per low power field.  Few yeast  Rare Gram negative rods  Few WBC's   RESPIRATORYC No S aureus isolated.  SERRATIA MARCESCENS  Moderate  Normal respiratory jose also present  *  PSEUDOMONAS AERUGINOSA  Moderate  * No S aureus or Pseudomonas isolated.  SERRATIA MARCESCENS  Moderate  * No S aureus or Pseudomonas isolated.  SERRATIA MARCESCENS  Moderate  * No S aureus or Pseudomonas isolated.  SERRATIA MARCESCENS  Few  * No S aureus or Pseudomonas isolated.  ACINETOBACTER BAUMANNII   Many  Susceptibility pending  *  ESCHERICHIA COLI  Moderate  Susceptibility pending  *  --      Urine Culture:   Recent Labs   Lab 08/26/20  0915  11/13/20  1230 12/06/20  1210   LABURIN No significant growth Multiple organisms isolated. None in predominance.  Repeat if  clinically necessary. CANDIDA TROPICALIS (SUCROSE NEGATIVE)  10,000 - 49,999 cfu/ml  Treatment of asymptomatic candiduria is not recommended (except for   specific populations). Candida isolated in the urine typically   represents colonization. If an indwelling urinary catheter is present  it should be removed or replaced.  *     Wound Culture: No results for input(s): LABAERO in the last 4320 hours.  All pertinent labs within the past 24 hours have been reviewed.    Significant Imaging: I have reviewed all pertinent imaging results/findings within the past 24 hours.

## 2020-12-14 NOTE — ASSESSMENT & PLAN NOTE
S/p recent Colostomy placement on 10/29/2020 followed by Revision due to Colostomy prolapse on 12/08/2020. Dr. Ortez who did surgery is aware of admit.

## 2020-12-14 NOTE — SIGNIFICANT EVENT
ER EKG reviewed and abnormal. Stat repeat EKG and troponin ordered. Repeat EKG still abnormal with signs of possible STEMI. Cardiology consulted.

## 2020-12-14 NOTE — ASSESSMENT & PLAN NOTE
Dynamical ST segment elevation on EKG    -coronary spasm vs artifact caused by tachycardia  -on indication for Heparin gtt and ASA rx due to ongoing GI bleeding and anemia  -continue medical Rx    12/14/2020  -Repeat EKG today with no further ST elevation  -Continue mgmt per primary team  -Will sign off, please re-consult if needed.

## 2020-12-14 NOTE — ASSESSMENT & PLAN NOTE
GGT also high. Pending CT abd/pelvis with contrast. Appreciate GI recs. Consideration of zyvox as cause?

## 2020-12-14 NOTE — ASSESSMENT & PLAN NOTE
Will use Unasyn for the  , contact isolation , continue Levaquin, stop zyvox  for now     Will follow repeat blood cultures.

## 2020-12-14 NOTE — PROGRESS NOTES
Reviewed CT scan results.  Third spacing evident suggesting need for gentle diuresis.  Will do 20 mg IV x1 with resumption of gentle hydration since he has been tachycardic.  Will appreciate Infectious Disease recs.  Discussed case with Dr. Mickie Bautista  Planning for EGD tomorrow.  Continue on IV antibiotics.  Dispo: guarded

## 2020-12-14 NOTE — SUBJECTIVE & OBJECTIVE
Past Medical History:   Diagnosis Date    Acid reflux     Anemia of chronic disease 8/30/2020    Cerebral palsy     Colitis     Decubitus ulcer of ischial area, left, stage IV 9/10/2020    History of hip surgery     Osteomyelitis of pelvis 9/24/2020    Pneumoperitoneum 9/21/2020    Pressure injury of right ischium, stage 4     S/P percutaneous endoscopic gastrostomy (PEG) tube placement     Seizure disorder 8/26/2020    Seizures     Severe protein-calorie malnutrition     Sinus tachycardia 8/4/2020       Past Surgical History:   Procedure Laterality Date    CHOLECYSTECTOMY      FLEXIBLE SIGMOIDOSCOPY N/A 9/9/2020    Procedure: SIGMOIDOSCOPY, FLEXIBLE;  Surgeon: America Goode MD;  Location: [facility] ([address] [place] [address]);  Service: Endoscopy;  Laterality: N/A;    HIP SURGERY      REVISION COLOSTOMY N/A 12/8/2020    Procedure: REVISION, COLOSTOMY;  Surgeon: Michael Ortez MD;  Location: [facility];  Service: General;  Laterality: N/A;  with partial colectomy    TRACHEOSTOMY N/A 10/27/2020    Procedure: CREATION, TRACHEOSTOMY;  Surgeon: Kar Palma MD;  [facility];  Service: ENT;  Laterality: N/A;       Review of patient's allergies indicates:   Allergen Reactions    Morphine sulfate Hives     Family History     Problem Relation (Age of Onset)    Cancer Maternal Grandfather        Tobacco Use    Smoking status: Never Smoker    Smokeless tobacco: Never Used   Substance and Sexual Activity    Alcohol use: Never     Frequency: Never    Drug use: Not Currently    Sexual activity: Never     Review of Systems   Unable to perform ROS: Patient nonverbal     Objective:     Vital Signs (Most Recent):  Temp: 98.4 °F (36.9 °C) (12/14/20 0700)  Pulse: (!) 130 (12/14/20 0700)  Resp: 20 (12/14/20 0700)  BP: (!) 130/90 (12/14/20 0700)  SpO2: 97 % (12/14/20 0700) Vital Signs (24h Range):  Temp:  [97.4 °F (36.3 °C)-99.4 °F (37.4 °C)] 98.4 °F (36.9 °C)  Pulse:  [110-140] 130  Resp:  [17-32] 20  SpO2:  [97 %-100 %]  97 %  BP: (120-148)/() 130/90     Weight: 38.1 kg (83 lb 14.4 oz) (12/13/20 1207)  Body mass index is 20.23 kg/m².      Intake/Output Summary (Last 24 hours) at 12/14/2020 0828  Last data filed at 12/14/2020 0600  Gross per 24 hour   Intake 472.5 ml   Output 675 ml   Net -202.5 ml       Lines/Drains/Airways     Central Venous Catheter Line            Tunneled Central Line Insertion/Assessment - Double Lumen  09/28/20 1400 right subclavian 76 days          Drain                 Gastrostomy/Enterostomy 08/04/20 1653 Percutaneous endoscopic gastrostomy (PEG) feeding 131 days         Gastrostomy/Enterostomy 10/22/20 1300 LUQ 52 days         Colostomy 10/29/20 1349 Loop LLQ 45 days         Urethral Catheter 11/13/20 1230 Latex 16 Fr. 30 days         Urethral Catheter 12/06/20 1207 Latex 16 Fr. 7 days          Airway                 Surgical Airway Shiley Cuffed -- days         Surgical Airway 10/27/20 0855 Shiley Cuffed 48 days         Airway - Non-Surgical 12/08/20 0924 Endotracheal Tube 5 days                Physical Exam  Constitutional:       General: He is awake.      Appearance: He is cachectic. He is ill-appearing.   HENT:      Head: Normocephalic.   Eyes:      Extraocular Movements: Extraocular movements intact.   Neck:      Trachea: Tracheostomy present.   Cardiovascular:      Rate and Rhythm: Regular rhythm. Tachycardia present.      Heart sounds: Normal heart sounds. No murmur.   Pulmonary:      Effort: Pulmonary effort is normal. No respiratory distress.      Breath sounds: Normal breath sounds. No wheezing.      Comments: Trach noted.  Abdominal:      General: Bowel sounds are decreased. There is distension.      Palpations: Abdomen is soft. There is no hepatomegaly or mass.      Tenderness: There is no abdominal tenderness.   Musculoskeletal:      Right lower leg: No edema.      Left lower leg: No edema.   Skin:     General: Skin is dry.      Findings: No rash.         Significant Labs:  CBC:   Recent  Labs   Lab 12/13/20  1216  12/13/20  2257 12/14/20  0522 12/14/20  0703   WBC 16.90*  --   --   --  15.87*   HGB 8.6*  --   --   --  9.8*   HCT 28.9*   < > 35.0*  35.0* 50.7  50.7 31.9*   *  --   --   --  362*    < > = values in this interval not displayed.     CMP:   Recent Labs   Lab 12/14/20  0703   *   CALCIUM 7.8*   ALBUMIN 1.4*   PROT 5.5*      K 3.6   CO2 23      BUN 36*   CREATININE 0.7   ALKPHOS 1,874*   ALT 33   AST 68*   BILITOT 0.7     Coagulation:   Recent Labs   Lab 12/13/20  1216   INR 1.2   APTT <21.0       Significant Imaging:  Imaging results within the past 24 hours have been reviewed.

## 2020-12-14 NOTE — PLAN OF CARE
Ongoing (interventions implemented as appropriate)  Pt non verbal.  Pt remained afebrile throughout this shift.   Pt is bedbound  Pt remained free of falls this shift.   Plan of care reviewed. Family verbalizes understanding.   Patient sinus tachy on monitor.   Bed low, side rails up x 2, wheels locked, call light in reach.   Hourly rounding completed.   Will continue to monitor.

## 2020-12-14 NOTE — SUBJECTIVE & OBJECTIVE
Interval History: no acute issues noted o/n. Will sign off, please re-consult if needed.     Review of Systems   Unable to perform ROS: patient nonverbal     Objective:     Vital Signs (Most Recent):  Temp: 97.8 °F (36.6 °C) (12/14/20 1053)  Pulse: (!) 154 (12/14/20 1100)  Resp: 18 (12/14/20 1053)  BP: (!) 186/93 (12/14/20 1053)  SpO2: 99 % (12/14/20 1053) Vital Signs (24h Range):  Temp:  [97.4 °F (36.3 °C)-99 °F (37.2 °C)] 97.8 °F (36.6 °C)  Pulse:  [110-154] 154  Resp:  [17-32] 18  SpO2:  [97 %-100 %] 99 %  BP: (120-186)/() 186/93     Weight: 38.1 kg (83 lb 14.4 oz)  Body mass index is 20.23 kg/m².     SpO2: 99 %  O2 Device (Oxygen Therapy): Trach Collar      Intake/Output Summary (Last 24 hours) at 12/14/2020 1315  Last data filed at 12/14/2020 1100  Gross per 24 hour   Intake 472.5 ml   Output 925 ml   Net -452.5 ml       Lines/Drains/Airways     Central Venous Catheter Line            Tunneled Central Line Insertion/Assessment - Double Lumen  09/28/20 1400 right subclavian 77 days          Drain                 Gastrostomy/Enterostomy 08/04/20 1653 Percutaneous endoscopic gastrostomy (PEG) feeding 131 days         Gastrostomy/Enterostomy 10/22/20 1300 LUQ 53 days         Colostomy 10/29/20 1349 Descending/sigmoid LLQ 46 days         Urethral Catheter 11/13/20 1230 Latex 16 Fr. 31 days         Urethral Catheter 12/06/20 1207 Latex 16 Fr. 8 days          Airway                 Surgical Airway Shiley Cuffed -- days         Surgical Airway 10/27/20 0855 Shiley Cuffed 48 days         Airway - Non-Surgical 12/08/20 0924 Endotracheal Tube 6 days                Physical Exam   Constitutional: He is oriented to person, place, and time. He appears well-developed and well-nourished. No distress.   HENT:   Head: Normocephalic and atraumatic.   Neck: Normal range of motion and full passive range of motion without pain. Neck supple. No JVD present.   Cardiovascular: Regular rhythm, S1 normal, S2 normal and intact  distal pulses. Tachycardia present. PMI is not displaced. Exam reveals no distant heart sounds.   No murmur heard.  Pulses:       Radial pulses are 2+ on the right side and 2+ on the left side.        Dorsalis pedis pulses are 2+ on the right side and 2+ on the left side.   Remains in ST   Pulmonary/Chest: Effort normal. No accessory muscle usage. No respiratory distress. He has decreased breath sounds in the right lower field and the left lower field. He has no wheezes. He has no rales.   +trach   Abdominal:   +peg   Musculoskeletal: Normal range of motion.         General: No edema.      Right ankle: He exhibits swelling.      Left ankle: He exhibits swelling.      Comments: +scrotal edema   Neurological: He is alert and oriented to person, place, and time.   Skin: Skin is warm and dry. He is not diaphoretic. No cyanosis. Nails show no clubbing.   Psychiatric: He has a normal mood and affect. His speech is normal and behavior is normal. Judgment and thought content normal. Cognition and memory are normal.   Nursing note and vitals reviewed.      Significant Labs:   BMP:   Recent Labs   Lab 12/13/20  1216 12/14/20  0703   * 174*    143   K 3.9 3.6    108   CO2 26 23   BUN 46* 36*   CREATININE 0.7 0.7   CALCIUM 7.8* 7.8*   MG 2.2 2.0   , CBC   Recent Labs   Lab 12/13/20  1216  12/14/20  0703   WBC 16.90*  --  15.87*   HGB 8.6*  --  9.8*   HCT 28.9*   < > 31.9*   *  --  362*    < > = values in this interval not displayed.   , Troponin   Recent Labs   Lab 12/13/20  2148 12/14/20  0220 12/14/20  0703   TROPONINI 0.112* <0.006 <0.006    and All pertinent lab results from the last 24 hours have been reviewed.    Significant Imaging: Echocardiogram:   Transthoracic echo (TTE) complete (Cupid Only):   Results for orders placed or performed during the hospital encounter of 09/20/20   Echo Color Flow Doppler? Yes   Result Value Ref Range    Ascending aorta 2.33 cm    STJ 2.54 cm    AV mean gradient  2 mmHg    Ao peak jaren 0.92 m/s    Ao VTI 13.79 cm    IVRT 70.41 msec    IVS 0.84 0.6 - 1.1 cm    LA size 2.26 cm    Left Atrium Major Axis 4.46 cm    Left Atrium Minor Axis 4.77 cm    LVIDd 3.90 3.5 - 6.0 cm    LVIDs 3.22 2.1 - 4.0 cm    LVOT diameter 2.10 cm    LVOT peak VTI 11.78 cm    Posterior Wall 0.58 (A) 0.6 - 1.1 cm    MV Peak A Jaren 0.75 m/s    E wave decelartion time 81.19 msec    MV Peak E Jaren 0.73 m/s    RA Major Axis 3.43 cm    RA Width 2.54 cm    RVDD 2.92 cm    Sinus 2.79 cm    TAPSE 2.36 cm    TR Max Jaren 2.42 m/s    TDI LATERAL 0.12 m/s    TDI SEPTAL 0.13 m/s    LA WIDTH 2.90 cm    MV stenosis pressure 1/2 time 23.55 ms    LV Diastolic Volume 65.79 mL    LV Systolic Volume 41.58 mL    RV S' 14.85 cm/s    LVOT peak jaren 0.76 m/s    LV LATERAL E/E' RATIO 6.08 m/s    LV SEPTAL E/E' RATIO 5.62 m/s    FS 17 %    LA volume 25.68 cm3    LV mass 76.52 g    Left Ventricle Relative Wall Thickness 0.30 cm    AV valve area 2.96 cm2    AV Velocity Ratio 0.83     AV index (prosthetic) 0.85     MV valve area p 1/2 method 9.34 cm2    E/A ratio 0.97     Mean e' 0.13 m/s    LVOT area 3.5 cm2    LVOT stroke volume 40.78 cm3    AV peak gradient 3 mmHg    E/E' ratio 5.84 m/s    LV Systolic Volume Index 35.7 mL/m2    LV Diastolic Volume Index 56.47 mL/m2    LA Volume Index 22.0 mL/m2    LV Mass Index 66 g/m2    Triscuspid Valve Regurgitation Peak Gradient 23 mmHg    BSA 1.15 m2    Right Atrial Pressure (from IVC) 3 mmHg    TV rest pulmonary artery pressure 26 mmHg    Narrative    · The left ventricle is normal in size with low normal systolic function.   The estimated ejection fraction is 50%.  · Normal left ventricular diastolic function.  · Normal right ventricular systolic function.  · Mild tricuspid regurgitation.  · The estimated PA systolic pressure is 26 mmHg.  · Normal central venous pressure (3 mmHg).  · Tachcardia

## 2020-12-14 NOTE — HOSPITAL COURSE
12/14/2020-Patient seen and examined in room, lying in bed. Remains non-verbal today. Repeat EKG today without any ischemic changes. Will sign off, please re-consult if needed.

## 2020-12-14 NOTE — TELEPHONE ENCOUNTER
Pre op call complete. Instructions reviewed with patient's nurse. Patient's nurse expressed understanding.

## 2020-12-15 ENCOUNTER — ANESTHESIA (OUTPATIENT)
Dept: ENDOSCOPY | Facility: HOSPITAL | Age: 25
DRG: 871 | End: 2020-12-15
Payer: MEDICAID

## 2020-12-15 ENCOUNTER — ANESTHESIA EVENT (OUTPATIENT)
Dept: ENDOSCOPY | Facility: HOSPITAL | Age: 25
DRG: 871 | End: 2020-12-15
Payer: MEDICAID

## 2020-12-15 PROBLEM — K92.1 MELENA: Status: RESOLVED | Noted: 2020-12-14 | Resolved: 2020-12-15

## 2020-12-15 PROBLEM — J98.11 ATELECTASIS: Status: ACTIVE | Noted: 2020-12-15

## 2020-12-15 PROBLEM — K92.1 MELENA: Status: ACTIVE | Noted: 2020-12-15

## 2020-12-15 PROBLEM — K25.0 ACUTE GASTRIC ULCER WITH HEMORRHAGE: Status: ACTIVE | Noted: 2020-12-15

## 2020-12-15 LAB
BACTERIA SPEC AEROBE CULT: ABNORMAL
BASOPHILS # BLD AUTO: 0.03 K/UL (ref 0–0.2)
BASOPHILS NFR BLD: 0.2 % (ref 0–1.9)
DIFFERENTIAL METHOD: ABNORMAL
EOSINOPHIL # BLD AUTO: 0.1 K/UL (ref 0–0.5)
EOSINOPHIL NFR BLD: 0.6 % (ref 0–8)
ERYTHROCYTE [DISTWIDTH] IN BLOOD BY AUTOMATED COUNT: 17.3 % (ref 11.5–14.5)
GRAM STN SPEC: ABNORMAL
HCT VFR BLD AUTO: 29.5 % (ref 40–54)
HGB BLD-MCNC: 8.9 G/DL (ref 14–18)
IMM GRANULOCYTES # BLD AUTO: 0.12 K/UL (ref 0–0.04)
IMM GRANULOCYTES NFR BLD AUTO: 0.9 % (ref 0–0.5)
LYMPHOCYTES # BLD AUTO: 1.5 K/UL (ref 1–4.8)
LYMPHOCYTES NFR BLD: 10.9 % (ref 18–48)
MCH RBC QN AUTO: 28.3 PG (ref 27–31)
MCHC RBC AUTO-ENTMCNC: 30.2 G/DL (ref 32–36)
MCV RBC AUTO: 94 FL (ref 82–98)
MONOCYTES # BLD AUTO: 1.7 K/UL (ref 0.3–1)
MONOCYTES NFR BLD: 12 % (ref 4–15)
NEUTROPHILS # BLD AUTO: 10.5 K/UL (ref 1.8–7.7)
NEUTROPHILS NFR BLD: 75.4 % (ref 38–73)
NRBC BLD-RTO: 0 /100 WBC
PLATELET # BLD AUTO: 408 K/UL (ref 150–350)
PMV BLD AUTO: 9.5 FL (ref 9.2–12.9)
RBC # BLD AUTO: 3.14 M/UL (ref 4.6–6.2)
WBC # BLD AUTO: 13.9 K/UL (ref 3.9–12.7)

## 2020-12-15 PROCEDURE — 27202363 HC INJECTION AGENT, SUBMUCOSAL, ANY: Performed by: INTERNAL MEDICINE

## 2020-12-15 PROCEDURE — C9113 INJ PANTOPRAZOLE SODIUM, VIA: HCPCS | Performed by: INTERNAL MEDICINE

## 2020-12-15 PROCEDURE — 37000009 HC ANESTHESIA EA ADD 15 MINS: Performed by: INTERNAL MEDICINE

## 2020-12-15 PROCEDURE — C9113 INJ PANTOPRAZOLE SODIUM, VIA: HCPCS | Performed by: PHYSICIAN ASSISTANT

## 2020-12-15 PROCEDURE — 63600175 PHARM REV CODE 636 W HCPCS: Performed by: FAMILY MEDICINE

## 2020-12-15 PROCEDURE — 88305 TISSUE EXAM BY PATHOLOGIST: CPT | Mod: 26,,, | Performed by: PATHOLOGY

## 2020-12-15 PROCEDURE — 43270 PR EGD, FLEX, W/ABLATION, TUMOR/POLYP/LESION(S), W/ DILATION: ICD-10-PCS | Mod: ,,, | Performed by: INTERNAL MEDICINE

## 2020-12-15 PROCEDURE — 27000221 HC OXYGEN, UP TO 24 HOURS

## 2020-12-15 PROCEDURE — 43239 PR EGD, FLEX, W/BIOPSY, SGL/MULTI: ICD-10-PCS | Mod: 59,,, | Performed by: INTERNAL MEDICINE

## 2020-12-15 PROCEDURE — 99900035 HC TECH TIME PER 15 MIN (STAT)

## 2020-12-15 PROCEDURE — 43239 EGD BIOPSY SINGLE/MULTIPLE: CPT | Performed by: INTERNAL MEDICINE

## 2020-12-15 PROCEDURE — 43270 EGD LESION ABLATION: CPT | Performed by: INTERNAL MEDICINE

## 2020-12-15 PROCEDURE — 36415 COLL VENOUS BLD VENIPUNCTURE: CPT

## 2020-12-15 PROCEDURE — 27201012 HC FORCEPS, HOT/COLD, DISP: Performed by: INTERNAL MEDICINE

## 2020-12-15 PROCEDURE — 25000003 PHARM REV CODE 250: Performed by: NURSE ANESTHETIST, CERTIFIED REGISTERED

## 2020-12-15 PROCEDURE — 88342 IMHCHEM/IMCYTCHM 1ST ANTB: CPT | Performed by: PATHOLOGY

## 2020-12-15 PROCEDURE — 88342 IMHCHEM/IMCYTCHM 1ST ANTB: CPT | Mod: 26,,, | Performed by: PATHOLOGY

## 2020-12-15 PROCEDURE — 85025 COMPLETE CBC W/AUTO DIFF WBC: CPT

## 2020-12-15 PROCEDURE — 99233 SBSQ HOSP IP/OBS HIGH 50: CPT | Mod: ,,, | Performed by: FAMILY MEDICINE

## 2020-12-15 PROCEDURE — 43239 EGD BIOPSY SINGLE/MULTIPLE: CPT | Mod: 59,,, | Performed by: INTERNAL MEDICINE

## 2020-12-15 PROCEDURE — 43270 EGD LESION ABLATION: CPT | Mod: ,,, | Performed by: INTERNAL MEDICINE

## 2020-12-15 PROCEDURE — 99233 PR SUBSEQUENT HOSPITAL CARE,LEVL III: ICD-10-PCS | Mod: ,,, | Performed by: FAMILY MEDICINE

## 2020-12-15 PROCEDURE — 88342 CHG IMMUNOCYTOCHEMISTRY: ICD-10-PCS | Mod: 26,,, | Performed by: PATHOLOGY

## 2020-12-15 PROCEDURE — 25000003 PHARM REV CODE 250: Performed by: NURSE PRACTITIONER

## 2020-12-15 PROCEDURE — 27201028 HC NEEDLE, SCLERO: Performed by: INTERNAL MEDICINE

## 2020-12-15 PROCEDURE — 94761 N-INVAS EAR/PLS OXIMETRY MLT: CPT

## 2020-12-15 PROCEDURE — 99233 PR SUBSEQUENT HOSPITAL CARE,LEVL III: ICD-10-PCS | Mod: ,,, | Performed by: INTERNAL MEDICINE

## 2020-12-15 PROCEDURE — 63600175 PHARM REV CODE 636 W HCPCS: Performed by: INTERNAL MEDICINE

## 2020-12-15 PROCEDURE — 63600175 PHARM REV CODE 636 W HCPCS: Performed by: PHYSICIAN ASSISTANT

## 2020-12-15 PROCEDURE — 37000008 HC ANESTHESIA 1ST 15 MINUTES: Performed by: INTERNAL MEDICINE

## 2020-12-15 PROCEDURE — 88305 TISSUE EXAM BY PATHOLOGIST: ICD-10-PCS | Mod: 26,,, | Performed by: PATHOLOGY

## 2020-12-15 PROCEDURE — 25000003 PHARM REV CODE 250: Performed by: FAMILY MEDICINE

## 2020-12-15 PROCEDURE — 21400001 HC TELEMETRY ROOM

## 2020-12-15 PROCEDURE — 63600175 PHARM REV CODE 636 W HCPCS: Performed by: NURSE ANESTHETIST, CERTIFIED REGISTERED

## 2020-12-15 PROCEDURE — 99233 SBSQ HOSP IP/OBS HIGH 50: CPT | Mod: ,,, | Performed by: INTERNAL MEDICINE

## 2020-12-15 PROCEDURE — 88305 TISSUE EXAM BY PATHOLOGIST: CPT | Performed by: PATHOLOGY

## 2020-12-15 PROCEDURE — 25000003 PHARM REV CODE 250: Performed by: INTERNAL MEDICINE

## 2020-12-15 RX ORDER — MIDAZOLAM HYDROCHLORIDE 1 MG/ML
INJECTION, SOLUTION INTRAMUSCULAR; INTRAVENOUS
Status: DISCONTINUED | OUTPATIENT
Start: 2020-12-15 | End: 2020-12-15

## 2020-12-15 RX ORDER — PROPOFOL 10 MG/ML
VIAL (ML) INTRAVENOUS
Status: DISCONTINUED | OUTPATIENT
Start: 2020-12-15 | End: 2020-12-15

## 2020-12-15 RX ORDER — METOPROLOL TARTRATE 25 MG/1
25 TABLET, FILM COATED ORAL 2 TIMES DAILY
Status: DISCONTINUED | OUTPATIENT
Start: 2020-12-15 | End: 2020-12-19

## 2020-12-15 RX ORDER — SUCRALFATE 1 G/10ML
1 SUSPENSION ORAL 2 TIMES DAILY
Status: DISCONTINUED | OUTPATIENT
Start: 2020-12-15 | End: 2020-12-17

## 2020-12-15 RX ORDER — FENTANYL CITRATE 50 UG/ML
INJECTION, SOLUTION INTRAMUSCULAR; INTRAVENOUS
Status: DISCONTINUED | OUTPATIENT
Start: 2020-12-15 | End: 2020-12-15

## 2020-12-15 RX ORDER — LIDOCAINE HYDROCHLORIDE 10 MG/ML
INJECTION, SOLUTION EPIDURAL; INFILTRATION; INTRACAUDAL; PERINEURAL
Status: DISCONTINUED | OUTPATIENT
Start: 2020-12-15 | End: 2020-12-15

## 2020-12-15 RX ORDER — SODIUM CHLORIDE, SODIUM LACTATE, POTASSIUM CHLORIDE, CALCIUM CHLORIDE 600; 310; 30; 20 MG/100ML; MG/100ML; MG/100ML; MG/100ML
INJECTION, SOLUTION INTRAVENOUS CONTINUOUS PRN
Status: DISCONTINUED | OUTPATIENT
Start: 2020-12-15 | End: 2020-12-15

## 2020-12-15 RX ADMIN — FENTANYL CITRATE 50 MCG: 50 INJECTION, SOLUTION INTRAMUSCULAR; INTRAVENOUS at 10:12

## 2020-12-15 RX ADMIN — LIDOCAINE HYDROCHLORIDE 50 MG: 10 INJECTION, SOLUTION EPIDURAL; INFILTRATION; INTRACAUDAL; PERINEURAL at 10:12

## 2020-12-15 RX ADMIN — AMPICILLIN SODIUM AND SULBACTAM SODIUM 3 G: 2; 1 INJECTION, POWDER, FOR SOLUTION INTRAMUSCULAR; INTRAVENOUS at 04:12

## 2020-12-15 RX ADMIN — MICONAZOLE NITRATE: 2 OINTMENT TOPICAL at 09:12

## 2020-12-15 RX ADMIN — LEVOFLOXACIN 750 MG: 750 INJECTION, SOLUTION INTRAVENOUS at 04:12

## 2020-12-15 RX ADMIN — PANTOPRAZOLE SODIUM 40 MG: 40 INJECTION, POWDER, FOR SOLUTION INTRAVENOUS at 08:12

## 2020-12-15 RX ADMIN — PROPOFOL 20 MG: 10 INJECTION, EMULSION INTRAVENOUS at 10:12

## 2020-12-15 RX ADMIN — AMPICILLIN SODIUM AND SULBACTAM SODIUM 3 G: 2; 1 INJECTION, POWDER, FOR SOLUTION INTRAMUSCULAR; INTRAVENOUS at 05:12

## 2020-12-15 RX ADMIN — METOPROLOL TARTRATE 25 MG: 25 TABLET, FILM COATED ORAL at 09:12

## 2020-12-15 RX ADMIN — AMPICILLIN SODIUM AND SULBACTAM SODIUM 3 G: 2; 1 INJECTION, POWDER, FOR SOLUTION INTRAMUSCULAR; INTRAVENOUS at 01:12

## 2020-12-15 RX ADMIN — MUPIROCIN: 20 OINTMENT TOPICAL at 08:12

## 2020-12-15 RX ADMIN — SUCRALFATE 1 G: 1 SUSPENSION ORAL at 05:12

## 2020-12-15 RX ADMIN — SODIUM CHLORIDE, SODIUM LACTATE, POTASSIUM CHLORIDE, AND CALCIUM CHLORIDE: .6; .31; .03; .02 INJECTION, SOLUTION INTRAVENOUS at 10:12

## 2020-12-15 RX ADMIN — PANTOPRAZOLE SODIUM 40 MG: 40 INJECTION, POWDER, FOR SOLUTION INTRAVENOUS at 09:12

## 2020-12-15 RX ADMIN — PHENOBARBITAL SODIUM 100.1 MG: 65 INJECTION INTRAMUSCULAR; INTRAVENOUS at 11:12

## 2020-12-15 RX ADMIN — MUPIROCIN: 20 OINTMENT TOPICAL at 10:12

## 2020-12-15 RX ADMIN — AMPICILLIN SODIUM AND SULBACTAM SODIUM 3 G: 2; 1 INJECTION, POWDER, FOR SOLUTION INTRAMUSCULAR; INTRAVENOUS at 09:12

## 2020-12-15 RX ADMIN — MICONAZOLE NITRATE: 2 OINTMENT TOPICAL at 10:12

## 2020-12-15 RX ADMIN — MIDAZOLAM HYDROCHLORIDE 2 MG: 1 INJECTION, SOLUTION INTRAMUSCULAR; INTRAVENOUS at 10:12

## 2020-12-15 RX ADMIN — PROPOFOL 50 MG: 10 INJECTION, EMULSION INTRAVENOUS at 10:12

## 2020-12-15 RX ADMIN — ENALAPRILAT 1.25 MG: 1.25 INJECTION INTRAVENOUS at 06:12

## 2020-12-15 NOTE — ASSESSMENT & PLAN NOTE
S/p recent Colostomy placement on 10/29/2020 followed by Revision due to Colostomy prolapse on 12/08/2020. Dr. Ortez who did surgery is aware of admit. Dr. Rao had no recommendations at this time. Atelectasis in post-op period

## 2020-12-15 NOTE — ASSESSMENT & PLAN NOTE
Will use Unasyn for the  , contact isolation , continue Levaquin, stop zyvox for now      Will follow repeat blood cultures

## 2020-12-15 NOTE — PLAN OF CARE
Pt transferred back to room via stretcher. QUINN YI. Procedure discussed with mother.     Bedside report given to receiving nurse

## 2020-12-15 NOTE — PLAN OF CARE
Aox self. Unable to verbalize needs. Calm & cooperative at this time.  POC reviewed w/ mother at bedside; interventions implemented as appropriate.   WCN consulted for multiple skin issues.  On 4L O2 via trach collar, tolerating well.  Receiving IV fluid & IV abx.   No s/s of pain.   Iglesias in place & patent. Colostomy patent.  Progressive mobility - severely impaired; unable to move extremities.   Frequent position changes provided.    NADN. Resting quietly in bed. Hourly rounding complete.   All safety measures remain in place. Mother remains at bedside. Educated on using call light. Free of falls. SR up x2; bed low & locked. Call light w/in reach.   Will continue to monitor throughout shift.

## 2020-12-15 NOTE — PROGRESS NOTES
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 Ortez on 12/08/2020 due to colostomy prolapse) due to bilateral sacral decubitus ulcers. He is a resident of [facility] where he was sent from today for reports of tachycardia, coffee ground emesis,  and dark/black stool noted coming from his colostomy. His stool for occult blood is positive and he has had a noted two  point drop in his hgb and hct  from 3 days ago. His BP has been stable.  Patient is being placed in hospital for further evaluation and treatment including GI and Surgery consult, Iv Abx for sepsis, and close monitoring of his GI bleed.     Patient seen in ER.    I had a long discussion with Mother, Leni Moscoso, [phone], at bedside and updated her on patient's status. Patient is extremely debilitated with multiple comorbidities. Mother is the decision maker and states patient is a Full Code. I explained to her patient's guarded prognosis and she  "wants everything done".      Overview/Hospital Course:  12/14:  Patient overall stable overnight however continues to be tachycardic.  Range of between 01/20/2040.  His alkaline phosphatase has trended upwards.  Appreciate gastroenterology recommendations.  No evidence of active bleeding at this time per PEG colostomy.  Getting CT abdomen and pelvis with contrast for " further insight into alkaline phosphatase and GGT elevation.  Discussed case Dr. Ortez and GI Dr. Bautista. Consulted palliative care to discuss goals of care. Discussed with Amanda Colmenares (palliative) and mother does not want to consider what to do if his condition worsens    12/15:  Patient's condition continues being guarded.  Appreciate infectious disease recs.  Zyvox and fluconazole was stopped.  His CRP and ESR are high.  Following cultures.  Continues to be on Levaquin and Unasyn.  CT angiogram yesterday ruled out pulmonary embolism and however confirmed atelectasis some pulmonary effusion in the setting postoperative status.  Unfortunately does not have lot options for positive pressure as he cannot follow directions to use incentive spirometry and not able to get up and move around to mobilize fluid.  Discussed case with mother.  Continues to have elevated phosphatase.  No structural reason this imaging.  Will be going for upper endoscopy today.    Interval History: Patient's condition continues being guarded.  Appreciate infectious disease recs.  Zyvox and fluconazole was stopped.  His CRP and ESR are high.  Following cultures.  Continues to be on Levaquin and Unasyn.  CT angiogram yesterday ruled out pulmonary embolism and however confirmed atelectasis some pulmonary effusion in the setting postoperative status.  Unfortunately does not have lot options for positive pressure as he cannot follow directions to use incentive spirometry and not able to get up and move around to mobilize fluid.  Discussed case with mother.  Continues to have elevated phosphatase.  No structural reason this imaging.  Will be going for upper endoscopy today.    Review of Systems   Unable to perform ROS: Patient nonverbal (mother does not think he is in pain and she says he seems more alert today)     Objective:     Vital Signs (Most Recent):  Temp: 99.7 °F (37.6 °C) (12/15/20 0717)  Pulse: (!) 136 (12/15/20 0844)  Resp: 18  (12/15/20 0844)  BP: 130/79 (12/15/20 0717)  SpO2: 98 % (12/15/20 0844) Vital Signs (24h Range):  Temp:  [97.8 °F (36.6 °C)-100.1 °F (37.8 °C)] 99.7 °F (37.6 °C)  Pulse:  [129-154] 136  Resp:  [18-20] 18  SpO2:  [92 %-99 %] 98 %  BP: (114-186)/(56-93) 130/79     Weight: 39.7 kg (87 lb 8.4 oz)  Body mass index is 21.1 kg/m².    Intake/Output Summary (Last 24 hours) at 12/15/2020 1037  Last data filed at 12/15/2020 0529  Gross per 24 hour   Intake 1050 ml   Output 2800 ml   Net -1750 ml      Physical Exam  Vitals signs and nursing note reviewed.   Constitutional:       General: He is not in acute distress.     Appearance: He is well-developed. He is ill-appearing. He is not diaphoretic.      Comments: At mental baseline   HENT:      Head: Normocephalic and atraumatic.      Nose: Nose normal.   Eyes:      General:         Right eye: No discharge.         Left eye: No discharge.      Conjunctiva/sclera: Conjunctivae normal.      Pupils: Pupils are equal, round, and reactive to light.      Comments: No purposeful eye movement     Neck:      Thyroid: No thyromegaly.      Trachea: Tracheostomy present.   Cardiovascular:      Rate and Rhythm: Regular rhythm. Tachycardia present.      Heart sounds: Normal heart sounds. No murmur.      Comments: Cool extremities  Pulmonary:      Effort: Pulmonary effort is normal. No respiratory distress.      Breath sounds: Normal breath sounds. No wheezing.   Abdominal:      General: There is no distension.      Palpations: Abdomen is soft.      Comments: PEG and colostomy in place. No evidence of bleeding   Genitourinary:     Comments: catheter  Skin:     General: Skin is warm.      Findings: No rash.   Neurological:      Mental Status: He is oriented to person, place, and time.   Psychiatric:         Behavior: Behavior normal.         Significant Labs:   CBC:   Recent Labs   Lab 12/14/20  0703 12/14/20  1909 12/15/20  0944   WBC 15.87* 14.69* 13.90*   HGB 9.8* 9.7* 8.9*   HCT 31.9* 31.6*  29.5*   * 451* 408*     CMP:   Recent Labs   Lab 12/13/20  1216 12/14/20  0703 12/14/20  1909    143 141   K 3.9 3.6 3.7    108 109   CO2 26 23 24   * 174* 83   BUN 46* 36* 29*   CREATININE 0.7 0.7 0.6   CALCIUM 7.8* 7.8* 7.6*   PROT 5.9* 5.5* 5.4*   ALBUMIN 1.4* 1.4* 1.3*   BILITOT 0.5 0.7 0.3   ALKPHOS 1,622* 1,874* 2,100*   AST 77* 68* 82*   ALT 34 33 46*   ANIONGAP 7* 12 8   EGFRNONAA >60 >60 >60       Significant Imaging: I have reviewed all pertinent imaging results/findings within the past 24 hours.      Assessment/Plan:      * Sepsis  This patient does have evidence of infective focus  My overall impression is sepsis. Vital signs were reviewed and noted in progress note.  Antibiotics given-   Antibiotics (From admission, onward)    Start     Stop Route Frequency Ordered    12/14/20 1630  ampicillin-sulbactam 3 g in sodium chloride 0.9 % 100 mL IVPB (ready to mix system)      -- IV Every 6 hours (non-standard times) 12/14/20 1521    12/14/20 1630  levoFLOXacin 750 mg/150 mL IVPB 750 mg      -- IV Every 24 hours (non-standard times) 12/14/20 1521    12/13/20 2100  mupirocin 2 % ointment      12/18 2059 Nasl 2 times daily 12/13/20 1649        Cultures were taken-   Microbiology Results (last 7 days)     Procedure Component Value Units Date/Time    Culture, Respiratory with Gram Stain [[id number]]  (Abnormal) Collected: 12/13/20 1855    Order Status: Completed Specimen: Respiratory from Sputum Updated: 12/15/20 0856     Respiratory Culture GRAM NEGATIVE DIETER  Moderate  Identification and susceptibility pending       Gram Stain (Respiratory) <10 epithelial cells per low power field.     Gram Stain (Respiratory) Few yeast     Gram Stain (Respiratory) Rare Gram negative rods     Gram Stain (Respiratory) Few WBC's    Blood culture x two cultures. Draw prior to antibiotics. [[id number]] Collected: 12/13/20 1530    Order Status: Completed Specimen: Blood from Peripheral, Antecubital, Left  Updated: 12/15/20 0613     Blood Culture, Routine No Growth to date      No Growth to date    Narrative:      Aerobic and anaerobic    Blood culture x two cultures. Draw prior to antibiotics. [[id number]] Collected: 12/13/20 1536    Order Status: Completed Specimen: Blood from Peripheral, Antecubital, Left Updated: 12/15/20 0613     Blood Culture, Routine No Growth to date      No Growth to date    Narrative:      Aerobic and anaerobic    Aerobic culture [[id number]] Collected: 12/14/20 1208    Order Status: Sent Specimen: Wound from Buttocks, Right Updated: 12/14/20 2024        Latest lactate reviewed, they are-  Recent Labs   Lab 12/13/20  1223 12/14/20  1909   LACTATE 1.5 1.1     Source- Suspected GI    Source control Achieved by- Iv Abx  ID, GI,  and General Surgery consulted        Atelectasis  In post-op period. Frequent repositioning. Cannot do Incentive spirometry    Candiduria  ID advised no tx. Rizvi removed.       Elevated alkaline phosphatase level  GGT also high. Continues to trend up. AST/ALT mildly elevated.  Appreciate GI recs.     Melena  No evidence of bleed at this time. egd today      Abnormal EKG  Cardiology felt no ischemic etiology and artifact likely       Sacral decubitus ulcer  Appreciate wound care assistance. 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 Recent cx negative for this      Hypertensive urgency  Monitor  Telemetry          Gastrointestinal hemorrhage  Hemoglobin with minimal change at this time (near baseline). EGD this PM  Monitor HGB and HCT q 6 hours  Patient to received 1  unit PRBCs thus far    Severe malnutrition  Appreciate dietary recs      Status post colostomy  S/p recent Colostomy placement on 10/29/2020 followed by Revision due to Colostomy prolapse on 12/08/2020. Dr. Ortez who did surgery is aware of admit. Dr. Rao had no recommendations at this time. Atelectasis in post-op period      Status post tracheostomy  Stable.      Seizure disorder  Patient currently NPO- Will give home dose phenobarbital Iv- Discussed with pharmacy      Sinus tachycardia  Secondary to underlying condition/atelectasis    Cerebral palsy  Underlying complicating factor to current medical course.  He at baseline is nonverbal and minimally responsive.  Discussion with mom and also have consulted palliative care.  Continues to be full code.        VTE Risk Mitigation (From admission, onward)         Ordered     IP VTE HIGH RISK PATIENT  Once      12/13/20 1726     Place SHANELLE hose  Until discontinued      12/13/20 1726     Place SHANELLE hose  Until discontinued      12/13/20 1658                Discharge Planning   ROCÍO:      Code Status: Full Code   Is the patient medically ready for discharge?:     Reason for patient still in hospital (select all that apply): Patient unstable, Patient trending condition, Treatment and Pending disposition  Discharge Plan A: Long-term acute care facility (LTAC)                  Layo Beck DO  Department of Hospital Medicine   [facility]

## 2020-12-15 NOTE — [employer] PATIENT INSTRUCTIONS
Discharge Summary/Instructions after an Endoscopic Procedure  Patient Name: Glen Moscoso  Patient MRN: [id number]  Patient YOB: 1995  Tuesday, December 15, 2020 Mickie Bautista MD  RESTRICTIONS:  During your procedure today, you received medications for sedation.  These   medications may affect your judgment, balance and coordination.  Therefore,   for 24 hours, you have the following restrictions:   - DO NOT drive a car, operate machinery, make legal/financial decisions,   sign important papers or drink alcohol.    ACTIVITY:  Today: no heavy lifting, straining or running due to procedural   sedation/anesthesia.  The following day: return to full activity including work.  DIET:  Eat and drink normally unless instructed otherwise.     TREATMENT FOR COMMON SIDE EFFECTS:  - Mild abdominal pain, nausea, belching, bloating or excessive gas:  rest,   eat lightly and use a heating pad.  - Sore Throat: treat with throat lozenges and/or gargle with warm salt   water.  - Because air was used during the procedure, expelling large amounts of air   from your rectum or belching is normal.  - If a bowel prep was taken, you may not have a bowel movement for 1-3 days.    This is normal.  SYMPTOMS TO WATCH FOR AND REPORT TO YOUR PHYSICIAN:  1. Abdominal pain or bloating, other than gas cramps.  2. Chest pain.  3. Back pain.  4. Signs of infection such as: chills or fever occurring within 24 hours   after the procedure.  5. Rectal bleeding, which would show as bright red, maroon, or black stools.   (A tablespoon of blood from the rectum is not serious, especially if   hemorrhoids are present.)  6. Vomiting.  7. Weakness or dizziness.  GO DIRECTLY TO THE NEAREST EMERGENCY ROOM IF YOU HAVE ANY OF THE FOLLOWING:      Difficulty breathing              Chills and/or fever over 101 F   Persistent vomiting and/or vomiting blood   Severe abdominal pain   Severe chest pain   Black, tarry stools   Bleeding- more than one  tablespoon   Any other symptom or condition that you feel may need urgent attention  Your doctor recommends these additional instructions:  If any biopsies were taken, your doctors clinic will contact you in 1 to 2   weeks with any results.  - Return patient to hospital prater for ongoing care.   - Resume previous diet.   - Continue present medications.   - Use Protonix (pantoprazole) 40 mg IV BID for 2 months.   - Use sucralfate suspension 1 gram PO QID for 2 weeks.   - No aspirin, ibuprofen, naproxen, or other non-steroidal anti-inflammatory   drugs.   - Relax the external bumper of PEG to 3.5cm from the skin.  - The findings and recommendations were communicated with the referring   physician, Dr. Beck.   - The findings and recommendations were communicated with the colorectal   surgeon, Dr. Ortez.   - The findings and recommendations were discussed with the patient's   family.  For questions, problems or results please call your physician Mickie Bautista MD at Work:  [phone]  If you have any questions about the above instructions, call the GI   department at [phone] or call the endoscopy unit at [phone]   from 7am until 3 pm.  [facility], EMERGENCY ROOM PHONE NUMBER:   [phone]  IF A COMPLICATION OR EMERGENCY SITUATION ARISES AND YOU ARE UNABLE TO REACH   YOUR PHYSICIAN - GO DIRECTLY TO THE EMERGENCY ROOM.  I have read or have had read to me these discharge instructions for my   procedure and have received a written copy.  I understand these   instructions and will follow-up with my physician if I have any questions.     __________________________________       _____________________________________  Nurse Signature                                          Patient/Designated   Responsible Party Signature  MD Mickie Nguyen MD  12/15/2020 12:25:44 PM  This report has been verified and signed electronically.  [employer]

## 2020-12-15 NOTE — ANESTHESIA POSTPROCEDURE EVALUATION
Anesthesia Post Evaluation    Patient: Glen Moscoso    Procedure(s) Performed: Procedure(s) (LRB):  EGD (ESOPHAGOGASTRODUODENOSCOPY) (N/A)    Final Anesthesia Type: MAC    Patient location: Recovering pt in room 4 endo.  Patient participation: Yes- Able to Participate  Level of consciousness: awake and alert  Post-procedure vital signs: reviewed and stable  Pain management: adequate  Airway patency: patent    PONV status at discharge: No PONV  Anesthetic complications: no      Cardiovascular status: blood pressure returned to baseline and hemodynamically stable  Respiratory status: unassisted, spontaneous ventilation and T-piece  Hydration status: euvolemic  Follow-up not needed.          Vitals Value Taken Time   /58 12/15/20 1117   Temp  12/15/20 1126   Pulse 137 12/15/20 1117   Resp 20 12/15/20 1117   SpO2 95 % 12/15/20 1123         No case tracking events are documented in the log.      Pain/Lulú Score: Lulú Score: 7 (12/15/2020 11:17 AM)

## 2020-12-15 NOTE — INTERVAL H&P NOTE
The patient has been examined and the H&P has been reviewed:    I concur with the findings and changes have been noted since the H&P was written: mother Mrs. Moscoso reports he is normally tachycardic in 130-140's. She does not recall the name of his medication but it does help with the tachycardia. She herself denies seeing hematemesis or coffee ground emesis. She understands the output from the colostomy is now brown. Todays' EGD though he shows no active bleeding is to rule out a impending GI bleed and diagnose pathology since he is non-verbal.     The risks, benefits and alternatives of the procedure were discussed with the patient's mother Leni Moscoso in detail. This discussion was had in the presence of endoscopy staff. The risks include, risks of adverse reaction to sedation requiring the use of reversal agents, bleeding requiring blood transfusion, perforation requiring surgical intervention and technical failure. Other risks include aspiration leading to respiratory distress and respiratory failure resulting in endotracheal intubation and mechanical ventilation including death. If anesthesia is being utilized for this procedure, it is up to the anesthesiologist to determine airway safety including elective endotracheal intubation. Questions were answered, they agree to proceed. There was no language barriers.     Anesthesia/Surgery risks, benefits and alternative options discussed and understood by patient/family.          Active Hospital Problems    Diagnosis  POA    *Sepsis [A41.9]  Yes    Atelectasis [J98.11]  Yes    Melena [K92.1]  Yes    Elevated alkaline phosphatase level [R74.8]  Yes    Encounter for palliative care [Z51.5]  Not Applicable    Candiduria [B37.49]  Unknown    Gastrointestinal hemorrhage [K92.2]  Yes    Hypertensive urgency [I16.0]  Yes    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]  Yes    History of infection due to multidrug resistant  Acinetobacter baumannii [Z86.19]  Yes    Chronic indwelling Iglesias catheter [Z97.8]  Not Applicable    Sacral decubitus ulcer [L89.159]  Yes     Bilateral      Abnormal EKG [R94.31]  Unknown    Severe malnutrition [E43]  Yes    Status post colostomy [Z93.3]  Not Applicable    Status post tracheostomy [Z93.0]  Not Applicable    Seizure disorder [G40.909]  Yes     Chronic    Cerebral palsy [G80.9]  Yes     Chronic    Sinus tachycardia [R00.0]  Yes      Resolved Hospital Problems   No resolved problems to display.

## 2020-12-15 NOTE — ANESTHESIA PREPROCEDURE EVALUATION
12/15/2020  Glen Moscoso is a 25 y.o., male.    Anesthesia Evaluation    I have reviewed the Patient Summary Reports.    I have reviewed the Nursing Notes. I have reviewed the NPO Status.   I have reviewed the Medications.     Review of Systems  Anesthesia Hx:  No problems with previous Anesthesia  Denies Family Hx of Anesthesia complications.   Denies Personal Hx of Anesthesia complications.   Social:  Non-Smoker    Hematology/Oncology:  Hematology Normal   Oncology Normal     EENT/Dental:EENT/Dental Normal   Cardiovascular:   Exercise tolerance: poor Hypertension, well controlled ECG has been reviewed.    Pulmonary:  Pulmonary Normal Pt has trach that was placed earlier this year due to aspiration risk.    Renal/:  Renal/ Normal     Hepatic/GI:   GERD    Musculoskeletal:  Musculoskeletal Normal    Neurological:   Neuromuscular Disease, Seizures, well controlled Pt has CP   Endocrine:  Endocrine Normal    Dermatological:  Skin Normal    Psych:  Psychiatric Normal           Physical Exam  General:  Well nourished    Airway/Jaw/Neck:  Airway Findings: Mouth Opening: Small, but > 3cm Tongue: Normal  Pre-Existing Airway Tube(s): Tracheostomy tube  General Airway Assessment: Adult  Mallampati: III  TM Distance: Normal, at least 6 cm  Jaw/Neck Findings:  Neck ROM: Normal ROM      Dental:  Dental Findings: In tact   Chest/Lungs:  Chest/Lungs Findings: Clear to auscultation, Normal Respiratory Rate     Heart/Vascular:  Heart Findings: Rate: Normal  Rhythm: Regular Rhythm             Anesthesia Plan  Type of Anesthesia, risks & benefits discussed:  Anesthesia Type:  MAC  Patient's Preference:   Intra-op Monitoring Plan: standard ASA monitors  Intra-op Monitoring Plan Comments:   Post Op Pain Control Plan: per primary service following discharge from PACU  Post Op Pain Control Plan Comments:   Induction:    IV  Beta Blocker:  Patient is not currently on a Beta-Blocker (No further documentation required).       Informed Consent: Patient understands risks and agrees with Anesthesia plan.  Questions answered. Anesthesia consent signed with patient.  ASA Score: 3     Day of Surgery Review of History & Physical: I have interviewed and examined the patient. I have reviewed the patient's H&P dated:  There are no significant changes.          Ready For Surgery From Anesthesia Perspective.

## 2020-12-15 NOTE — BRIEF OP NOTE
Inpatient Endoscopy Brief Operative Note      Admit Date: 12/13/2020    Procedure Date and Time:  12/15/2020 12:37 PM    Attending Physician: Layo Beck DO     Principal Admitting Diagnoses: Sepsis         Discharge Diagnosis: The primary encounter diagnosis was Acute on chronic anemia. Diagnoses of Tachycardia, Gastrointestinal hemorrhage, unspecified gastrointestinal hemorrhage type, SIRS (systemic inflammatory response syndrome), Demand ischemia, Abnormal LFTs, Severe flexion contractures of all joints, Encephalopathy, Cerebral palsy, unspecified type, Nonverbal, Chronic indwelling Iglesias catheter, Melanotic stools, Chronic UTI, and Abnormal ECG were also pertinent to this visit.     Discharged Condition: Stable    Indication for Admission: Sepsis       Procedure Performed: EGD with biopsy and control of bleeding     SEE [employer] REPORTS FOR DETAILS.    Pathology (if any):  Specimen (12h ago, onward)    None          Estimated Blood Loss: 2 ml.    Discussed with: family and communicated with Preston Beck and Pérez.    Disposition: Return to hospital prater.    Recommendations:   1. IV Protonix BID  Then switch to via PEG BIX x 2 months  2. Carafate 1g suspension via PEG x 2 weeks  3. Restart tube feeds  4. Loosen external bumper to 3.5cm from skin      Addendum: 12/15/20 4:30pm: discussed EGD report and findings with Mrs. Moscoso. Reviewed each photo with her and answered questions. She inquired about the amount of bile leakage from the PEG site. I explained to her this was due to us intentionally relaxing the external bumper to 3.5cm. I cleaned the PEG site and placed a new 4x4 underneath the PEG and moved the external bumper. It appeared snug and the PEG easily twirled. There was no grimacing. The stool output I colostomy bag is dark green.       Will sign off, call if needed.       Mickie Bautista MD  Inpatient Gastroenterology  [facility]

## 2020-12-15 NOTE — PROGRESS NOTES
Trach care done. A copious amount of yellow/green drainage was noted on the drain sponge and all around the stoma. Site was cleaned and new dressing applied. Small blisters near trach on patients upper chest. Wound care notified.

## 2020-12-15 NOTE — TRANSFER OF CARE
"Anesthesia Transfer of Care Note    Patient: Glen Moscoso    Procedure(s) Performed: Procedure(s) (LRB):  EGD (ESOPHAGOGASTRODUODENOSCOPY) (N/A)    Patient location: Other: (Endo room 4)    Anesthesia Type: MAC    Transport from OR: Upon arrival to PACU/ICU, patient attached to 100% O2 by T-piece with adequate spontaneous ventilation    Post pain: adequate analgesia    Post assessment: no apparent anesthetic complications and tolerated procedure well    Post vital signs: stable    Level of consciousness: awake    Nausea/Vomiting: no nausea/vomiting    Complications: none    Transfer of care protocol was followed      Last vitals:   Visit Vitals  /64   Pulse (!) 128   Temp 37.6 °C (99.7 °F) (Axillary)   Resp 18   Ht 4' 6" (1.372 m)   Wt 39.7 kg (87 lb 8.4 oz)   SpO2 100%   BMI 21.10 kg/m²     "

## 2020-12-15 NOTE — CARE UPDATE
I stopped by to check on leni and Mr. Moscoso. Leni was aware of the EGD report but still unclear of the plan going forward. She is still not open to discussing possible outcomes or the possibility of end of life care for Mr. Moscoso. I will follow peripherally but do not feel that I will be able to advance this discussion without objective evidence of decline or in the face of pivotal situations.

## 2020-12-15 NOTE — ASSESSMENT & PLAN NOTE
Hemoglobin with minimal change at this time (near baseline). EGD this PM  Monitor HGB and HCT q 6 hours  Patient to received 1 unit PRBCs thus far

## 2020-12-15 NOTE — ASSESSMENT & PLAN NOTE
This patient does have evidence of infective focus  My overall impression is sepsis. Vital signs were reviewed and noted in progress note.  Antibiotics given-   Antibiotics (From admission, onward)    Start     Stop Route Frequency Ordered    12/14/20 1630  ampicillin-sulbactam 3 g in sodium chloride 0.9 % 100 mL IVPB (ready to mix system)      -- IV Every 6 hours (non-standard times) 12/14/20 1521    12/14/20 1630  levoFLOXacin 750 mg/150 mL IVPB 750 mg      -- IV Every 24 hours (non-standard times) 12/14/20 1521    12/13/20 2100  mupirocin 2 % ointment      12/18 2059 Nasl 2 times daily 12/13/20 1649        Cultures were taken-   Microbiology Results (last 7 days)     Procedure Component Value Units Date/Time    Culture, Respiratory with Gram Stain [[id number]]  (Abnormal) Collected: 12/13/20 1855    Order Status: Completed Specimen: Respiratory from Sputum Updated: 12/15/20 0856     Respiratory Culture GRAM NEGATIVE DIETER  Moderate  Identification and susceptibility pending       Gram Stain (Respiratory) <10 epithelial cells per low power field.     Gram Stain (Respiratory) Few yeast     Gram Stain (Respiratory) Rare Gram negative rods     Gram Stain (Respiratory) Few WBC's    Blood culture x two cultures. Draw prior to antibiotics. [[id number]] Collected: 12/13/20 1530    Order Status: Completed Specimen: Blood from Peripheral, Antecubital, Left Updated: 12/15/20 0613     Blood Culture, Routine No Growth to date      No Growth to date    Narrative:      Aerobic and anaerobic    Blood culture x two cultures. Draw prior to antibiotics. [[id number]] Collected: 12/13/20 1536    Order Status: Completed Specimen: Blood from Peripheral, Antecubital, Left Updated: 12/15/20 0613     Blood Culture, Routine No Growth to date      No Growth to date    Narrative:      Aerobic and anaerobic    Aerobic culture [[id number]] Collected: 12/14/20 1208    Order Status: Sent Specimen: Wound from Buttocks, Right Updated: 12/14/20 2024         Latest lactate reviewed, they are-  Recent Labs   Lab 12/13/20  1223 12/14/20  1909   LACTATE 1.5 1.1     Source- Suspected GI    Source control Achieved by- Iv Abx  ID, GI,  and General Surgery consulted

## 2020-12-15 NOTE — DISCHARGE INSTRUCTIONS
Understanding Gastric Ulcers    A gastric ulcer is an open sore in the stomach lining. It is sometimes called a peptic ulcer. This is a more general term for ulcers that may be in the stomach or the upper part of the small intestine. Ulcers can cause pain. But they may also have no symptoms for a long time.  What causes gastric ulcers?  Gastric ulcers have a few common causes. To find the cause of your ulcer, your healthcare provider will give you an exam and take your health history. He or she may also order some tests. The main causes of gastric ulcers include:  · Infection with the H. pylori (Helicobacter pylori) bacteria. This damages the stomach lining. Digestive juices can then harm the digestive tract.  · Long-term use of some over-the-counter pain medicines. This reduces the bodys ability to protect the stomach from damage.  Other causes of gastric ulcers include:  · Heavy alcohol use  · Having a family history of ulcers  · In rare cases, a tumor in the digestive tract may cause an ulcer  Symptoms of a gastric ulcer  Ulcer symptoms may appear and then go away for a time. Symptoms of a gastric ulcer may include:  · Stomach pain, often a dull or burning feeling toward the top of your belly  · Feeling full or bloated  · Heartburn or acid reflux  · Upset stomach (nausea) or vomiting  · Vomiting blood  · Lack of appetite  · Weight loss  · Black stool  · Red blood in the stool  Treatment for a gastric ulcer  Treatment for gastric ulcers may depend on what is causing them. Treatment may include:  · Not using over-the-counter medicines. You will likely need to stop taking these medicines. But in some cases these medicines cant be safely stopped. Check with your healthcare provider to see what is best for you.   · Taking medicines to ease symptoms. These medicines may help to reduce the amount of acid your stomach makes. They also may help coat your stomach lining.  · Taking antibiotics. If your ulcer was caused  by H. pylori, your provider will likely prescribe antibiotics to get rid of the infection.  · Having an endoscopy. This is often done to check the stomach and diagnose the ulcer. In some cases it can also treat the ulcer. It involves passing a flexible tube through your mouth into your stomach and small intestine.  · Using a tube (catheter) to stop the bleeding. A thin tube is passed into one of your blood vessels. Special tools are used to help stop the bleeding.  · Having surgery. You often may need this if the ulcer has caused severe symptoms.  Making some lifestyle changes can reduce ulcer symptoms. It may also prevent more damage to your digestive tract. These changes include:  · Not taking over-the-counter pain medicines. Talk with your provider about using another type of pain reliever.  · Not taking aspirin unless your provider has advised it  · Limiting the amount of alcohol you drink  · Quitting smoking  Possible complications of a gastric ulcer  Gastric ulcers can have serious complications. These can include:  · Bleeding into the stomach  · A hole (perforation) in the stomach  · A blockage that interferes with food moving from your stomach to the small intestine  An ongoing infection with H. pylori may be a risk factor for stomach cancer. This is one reason it is important to get rid of this bacteria.  When to call your healthcare provider  Call your healthcare provider right away if you have any of these:  · Vomiting blood, or vomit that looks like coffee grounds  · Bloody, black, or tarry-looking stools  · Fever of 100.4°F (38°C) or higher, or as directed  · Pain that gets worse  · Symptoms that dont get better with treatment, or symptoms that get worse  · New symptoms   Date Last Reviewed: 5/1/2016  © 0503-6662 [employer]. [address] [place] [address], [place] [address]. All rights reserved. This information is not intended as a substitute for professional medical care. Always follow your  healthcare professional's instructions.

## 2020-12-15 NOTE — SUBJECTIVE & OBJECTIVE
Interval History: Patient's condition continues being guarded.  Appreciate infectious disease recs.  Zyvox and fluconazole was stopped.  His CRP and ESR are high.  Following cultures.  Continues to be on Levaquin and Unasyn.  CT angiogram yesterday ruled out pulmonary embolism and however confirmed atelectasis some pulmonary effusion in the setting postoperative status.  Unfortunately does not have lot options for positive pressure as he cannot follow directions to use incentive spirometry and not able to get up and move around to mobilize fluid.  Discussed case with mother.  Continues to have elevated phosphatase.  No structural reason this imaging.  Will be going for upper endoscopy today.    Review of Systems   Unable to perform ROS: Patient nonverbal (mother does not think he is in pain and she says he seems more alert today)     Objective:     Vital Signs (Most Recent):  Temp: 99.7 °F (37.6 °C) (12/15/20 0717)  Pulse: (!) 136 (12/15/20 0844)  Resp: 18 (12/15/20 0844)  BP: 130/79 (12/15/20 0717)  SpO2: 98 % (12/15/20 0844) Vital Signs (24h Range):  Temp:  [97.8 °F (36.6 °C)-100.1 °F (37.8 °C)] 99.7 °F (37.6 °C)  Pulse:  [129-154] 136  Resp:  [18-20] 18  SpO2:  [92 %-99 %] 98 %  BP: (114-186)/(56-93) 130/79     Weight: 39.7 kg (87 lb 8.4 oz)  Body mass index is 21.1 kg/m².    Intake/Output Summary (Last 24 hours) at 12/15/2020 1037  Last data filed at 12/15/2020 0529  Gross per 24 hour   Intake 1050 ml   Output 2800 ml   Net -1750 ml      Physical Exam  Vitals signs and nursing note reviewed.   Constitutional:       General: He is not in acute distress.     Appearance: He is well-developed. He is ill-appearing. He is not diaphoretic.      Comments: At mental baseline   HENT:      Head: Normocephalic and atraumatic.      Nose: Nose normal.   Eyes:      General:         Right eye: No discharge.         Left eye: No discharge.      Conjunctiva/sclera: Conjunctivae normal.      Pupils: Pupils are equal, round, and  reactive to light.      Comments: No purposeful eye movement     Neck:      Thyroid: No thyromegaly.      Trachea: Tracheostomy present.   Cardiovascular:      Rate and Rhythm: Regular rhythm. Tachycardia present.      Heart sounds: Normal heart sounds. No murmur.      Comments: Cool extremities  Pulmonary:      Effort: Pulmonary effort is normal. No respiratory distress.      Breath sounds: Normal breath sounds. No wheezing.   Abdominal:      General: There is no distension.      Palpations: Abdomen is soft.      Comments: PEG and colostomy in place. No evidence of bleeding   Genitourinary:     Comments: catheter  Skin:     General: Skin is warm.      Findings: No rash.   Neurological:      Mental Status: He is oriented to person, place, and time.   Psychiatric:         Behavior: Behavior normal.         Significant Labs:   CBC:   Recent Labs   Lab 12/14/20  0703 12/14/20  1909 12/15/20  0944   WBC 15.87* 14.69* 13.90*   HGB 9.8* 9.7* 8.9*   HCT 31.9* 31.6* 29.5*   * 451* 408*     CMP:   Recent Labs   Lab 12/13/20  1216 12/14/20  0703 12/14/20  1909    143 141   K 3.9 3.6 3.7    108 109   CO2 26 23 24   * 174* 83   BUN 46* 36* 29*   CREATININE 0.7 0.7 0.6   CALCIUM 7.8* 7.8* 7.6*   PROT 5.9* 5.5* 5.4*   ALBUMIN 1.4* 1.4* 1.3*   BILITOT 0.5 0.7 0.3   ALKPHOS 1,622* 1,874* 2,100*   AST 77* 68* 82*   ALT 34 33 46*   ANIONGAP 7* 12 8   EGFRNONAA >60 >60 >60       Significant Imaging: I have reviewed all pertinent imaging results/findings within the past 24 hours.

## 2020-12-16 LAB
BACTERIA SPEC AEROBE CULT: ABNORMAL
BACTERIA SPEC AEROBE CULT: ABNORMAL
BASOPHILS # BLD AUTO: 0.02 K/UL (ref 0–0.2)
BASOPHILS NFR BLD: 0.1 % (ref 0–1.9)
DIFFERENTIAL METHOD: ABNORMAL
EOSINOPHIL # BLD AUTO: 0.1 K/UL (ref 0–0.5)
EOSINOPHIL NFR BLD: 0.4 % (ref 0–8)
ERYTHROCYTE [DISTWIDTH] IN BLOOD BY AUTOMATED COUNT: 17.2 % (ref 11.5–14.5)
GRAM STN SPEC: ABNORMAL
HCT VFR BLD AUTO: 28.2 % (ref 40–54)
HGB BLD-MCNC: 8.6 G/DL (ref 14–18)
IMM GRANULOCYTES # BLD AUTO: 0.14 K/UL (ref 0–0.04)
IMM GRANULOCYTES NFR BLD AUTO: 1 % (ref 0–0.5)
LYMPHOCYTES # BLD AUTO: 1.5 K/UL (ref 1–4.8)
LYMPHOCYTES NFR BLD: 10.6 % (ref 18–48)
MCH RBC QN AUTO: 28.2 PG (ref 27–31)
MCHC RBC AUTO-ENTMCNC: 30.5 G/DL (ref 32–36)
MCV RBC AUTO: 93 FL (ref 82–98)
MONOCYTES # BLD AUTO: 1.6 K/UL (ref 0.3–1)
MONOCYTES NFR BLD: 11.2 % (ref 4–15)
NEUTROPHILS # BLD AUTO: 10.6 K/UL (ref 1.8–7.7)
NEUTROPHILS NFR BLD: 76.7 % (ref 38–73)
NRBC BLD-RTO: 0 /100 WBC
PLATELET # BLD AUTO: 486 K/UL (ref 150–350)
PMV BLD AUTO: 9.4 FL (ref 9.2–12.9)
RBC # BLD AUTO: 3.05 M/UL (ref 4.6–6.2)
WBC # BLD AUTO: 13.85 K/UL (ref 3.9–12.7)

## 2020-12-16 PROCEDURE — 25000003 PHARM REV CODE 250: Performed by: INTERNAL MEDICINE

## 2020-12-16 PROCEDURE — 99233 SBSQ HOSP IP/OBS HIGH 50: CPT | Mod: ,,, | Performed by: INTERNAL MEDICINE

## 2020-12-16 PROCEDURE — 25000003 PHARM REV CODE 250: Performed by: FAMILY MEDICINE

## 2020-12-16 PROCEDURE — 21400001 HC TELEMETRY ROOM

## 2020-12-16 PROCEDURE — 63600175 PHARM REV CODE 636 W HCPCS: Performed by: INTERNAL MEDICINE

## 2020-12-16 PROCEDURE — 99233 SBSQ HOSP IP/OBS HIGH 50: CPT | Mod: ,,, | Performed by: FAMILY MEDICINE

## 2020-12-16 PROCEDURE — 27000221 HC OXYGEN, UP TO 24 HOURS

## 2020-12-16 PROCEDURE — 99233 PR SUBSEQUENT HOSPITAL CARE,LEVL III: ICD-10-PCS | Mod: ,,, | Performed by: FAMILY MEDICINE

## 2020-12-16 PROCEDURE — 25000003 PHARM REV CODE 250: Performed by: NURSE PRACTITIONER

## 2020-12-16 PROCEDURE — 99900026 HC AIRWAY MAINTENANCE (STAT)

## 2020-12-16 PROCEDURE — 94761 N-INVAS EAR/PLS OXIMETRY MLT: CPT

## 2020-12-16 PROCEDURE — 99900035 HC TECH TIME PER 15 MIN (STAT)

## 2020-12-16 PROCEDURE — C9113 INJ PANTOPRAZOLE SODIUM, VIA: HCPCS | Performed by: INTERNAL MEDICINE

## 2020-12-16 PROCEDURE — 99233 PR SUBSEQUENT HOSPITAL CARE,LEVL III: ICD-10-PCS | Mod: ,,, | Performed by: INTERNAL MEDICINE

## 2020-12-16 PROCEDURE — 85025 COMPLETE CBC W/AUTO DIFF WBC: CPT

## 2020-12-16 RX ADMIN — AMPICILLIN SODIUM AND SULBACTAM SODIUM 3 G: 2; 1 INJECTION, POWDER, FOR SOLUTION INTRAMUSCULAR; INTRAVENOUS at 10:12

## 2020-12-16 RX ADMIN — AMPICILLIN SODIUM AND SULBACTAM SODIUM 3 G: 2; 1 INJECTION, POWDER, FOR SOLUTION INTRAMUSCULAR; INTRAVENOUS at 05:12

## 2020-12-16 RX ADMIN — METOPROLOL TARTRATE 25 MG: 25 TABLET, FILM COATED ORAL at 10:12

## 2020-12-16 RX ADMIN — PANTOPRAZOLE SODIUM 40 MG: 40 INJECTION, POWDER, FOR SOLUTION INTRAVENOUS at 10:12

## 2020-12-16 RX ADMIN — METOPROLOL TARTRATE 25 MG: 25 TABLET, FILM COATED ORAL at 08:12

## 2020-12-16 RX ADMIN — AMPICILLIN SODIUM AND SULBACTAM SODIUM 3 G: 2; 1 INJECTION, POWDER, FOR SOLUTION INTRAMUSCULAR; INTRAVENOUS at 04:12

## 2020-12-16 RX ADMIN — PANTOPRAZOLE SODIUM 40 MG: 40 INJECTION, POWDER, FOR SOLUTION INTRAVENOUS at 08:12

## 2020-12-16 RX ADMIN — DEXTROSE AND SODIUM CHLORIDE 50 ML/HR: 5; .9 INJECTION, SOLUTION INTRAVENOUS at 05:12

## 2020-12-16 RX ADMIN — MUPIROCIN: 20 OINTMENT TOPICAL at 10:12

## 2020-12-16 RX ADMIN — PHENOBARBITAL SODIUM 100.1 MG: 65 INJECTION INTRAMUSCULAR; INTRAVENOUS at 10:12

## 2020-12-16 RX ADMIN — LEVOFLOXACIN 750 MG: 750 INJECTION, SOLUTION INTRAVENOUS at 04:12

## 2020-12-16 RX ADMIN — MICONAZOLE NITRATE: 2 OINTMENT TOPICAL at 10:12

## 2020-12-16 RX ADMIN — SUCRALFATE 1 G: 1 SUSPENSION ORAL at 08:12

## 2020-12-16 RX ADMIN — MUPIROCIN: 20 OINTMENT TOPICAL at 08:12

## 2020-12-16 RX ADMIN — SUCRALFATE 1 G: 1 SUSPENSION ORAL at 10:12

## 2020-12-16 RX ADMIN — MICONAZOLE NITRATE: 2 OINTMENT TOPICAL at 09:12

## 2020-12-16 NOTE — ASSESSMENT & PLAN NOTE
Hemoglobin with minimal change at this time (near baseline).  GI recs after EGD   1. IV Protonix BID  Then switch to via PEG BIX x 2 months  2. Carafate 1g suspension via PEG x 2 weeks  3. Restart tube feeds  4. Loosen external bumper to 3.5cm from skin

## 2020-12-16 NOTE — SUBJECTIVE & OBJECTIVE
Interval History: No significant change over night.  Patient does appear to be more alert and moving his eyes looking around today.  Mom has no new concerns. WBC stable from yesterday.  Heart rate appears to be similar stable around 130. Discussed with Infectious Disease who continues to follow patient and is watching for culture results.  No changes to the antibiotic therapy at this time.    Review of Systems   Unable to perform ROS: Patient nonverbal (mother does not think he is in pain and she says he seems more alert today)     Objective:     Vital Signs (Most Recent):  Temp: (!) 100.8 °F (38.2 °C) (12/16/20 1154)  Pulse: (!) 121 (12/16/20 1247)  Resp: 18 (12/16/20 1154)  BP: 120/85 (12/16/20 1154)  SpO2: 96 % (12/16/20 1154) Vital Signs (24h Range):  Temp:  [99 °F (37.2 °C)-100.8 °F (38.2 °C)] 100.8 °F (38.2 °C)  Pulse:  [121-149] 121  Resp:  [18-20] 18  SpO2:  [86 %-100 %] 96 %  BP: (118-132)/(76-85) 120/85     Weight: 43 kg (94 lb 12.8 oz)  Body mass index is 22.86 kg/m².    Intake/Output Summary (Last 24 hours) at 12/16/2020 1337  Last data filed at 12/16/2020 0512  Gross per 24 hour   Intake 2225 ml   Output 2250 ml   Net -25 ml      Physical Exam  Vitals signs and nursing note reviewed.   Constitutional:       General: He is not in acute distress.     Appearance: He is well-developed. He is ill-appearing. He is not diaphoretic.      Comments: At mental baseline   HENT:      Head: Normocephalic and atraumatic.      Nose: Nose normal.   Eyes:      General:         Right eye: No discharge.         Left eye: No discharge.      Conjunctiva/sclera: Conjunctivae normal.      Pupils: Pupils are equal, round, and reactive to light.      Comments: No purposeful eye movement     Neck:      Thyroid: No thyromegaly.      Trachea: Tracheostomy present.   Cardiovascular:      Rate and Rhythm: Regular rhythm. Tachycardia present.      Heart sounds: Normal heart sounds. No murmur.      Comments: Cool  extremities  Pulmonary:      Effort: Pulmonary effort is normal. No respiratory distress.      Breath sounds: Normal breath sounds. No wheezing.   Abdominal:      General: There is no distension.      Palpations: Abdomen is soft.      Comments: PEG and colostomy in place. No evidence of bleeding   Genitourinary:     Comments: catheter  Skin:     General: Skin is warm.      Findings: No rash.   Neurological:      Mental Status: He is oriented to person, place, and time.   Psychiatric:         Behavior: Behavior normal.         Significant Labs:   CBC:   Recent Labs   Lab 12/14/20  1909 12/15/20  0944 12/16/20  1100   WBC 14.69* 13.90* 13.85*   HGB 9.7* 8.9* 8.6*   HCT 31.6* 29.5* 28.2*   * 408* 486*       Significant Imaging: I have reviewed all pertinent imaging results/findings within the past 24 hours.

## 2020-12-16 NOTE — PROGRESS NOTES
"F/U visit with Mr. Moscoso for continued wound care. He is awake and alert, mother at bedside. On specialty GRAY IMMERSE bed. Trach site assessed - drain sponge removed, moderate amount thick mucous secretions cleansed with saline and gauze. Rodrigo-trach skin with mild erythema, no blistering currently noted. Painted with cavilon and nonbordered foam dressing cut with T-slit and applied around trach.  Colostomy noted with thick liquid dark green stool in pouch. Emptied 150mL. Pouch intact with no leak.   Improvement in fungal MASD rash to skin noted, now mild erythema and resolving satelite lesions. Recommend continued antifungal moisture barrier per MAR. Decreasing scrotal and penile edema noted, recommend continued elevation with wash cloth roll. Turned to right side with max assistance. Dressings removed from bilateral ischium. Stage 4 pressure injuries again noted with moist pink wound beds, scant exposed bone. Again noted purple discoloration to right ischium wound bed, mild improvement since last assessment. Rodrigo- wound skin bilaterally again with fungal MASD noted, mildly improved from last assessment. Drainage from wounds is serosanguinous on dressing, no green tinge at this assessment. Wound culture still pending.   Recommend continued care per orders with Vashe wet to moist gauze dressings BID. Cleansed with vashe and filled with vashe wetted 4x4 gauze, and secured with foam dressings. Plan for re-application of wound vac once fungal rodrigo-wound infection has resolved.   Positioned on right side with foam wedge, heels floated with towel rolls. Tolerated care well. Mother at bedside updated on status of wounds are care provided. She asked several times if "his wounds are healing well". I informed her that There are many factors to consider in wound healing including chronic osteomyelitis, poor nutrition, offloading of wounds, and general poor health, and that I cannot promise her that the wounds will ever heal, " however, there is no necrotic tissue in wound beds which is a positive. Will follow.

## 2020-12-16 NOTE — PROGRESS NOTES
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 Ortez on 12/08/2020 due to colostomy prolapse) due to bilateral sacral decubitus ulcers. He is a resident of [facility] where he was sent from today for reports of tachycardia, coffee ground emesis,  and dark/black stool noted coming from his colostomy. His stool for occult blood is positive and he has had a noted two  point drop in his hgb and hct  from 3 days ago. His BP has been stable.  Patient is being placed in hospital for further evaluation and treatment including GI and Surgery consult, Iv Abx for sepsis, and close monitoring of his GI bleed.     Patient seen in ER.    I had a long discussion with Mother, Leni Moscoso, [phone], at bedside and updated her on patient's status. Patient is extremely debilitated with multiple comorbidities. Mother is the decision maker and states patient is a Full Code. I explained to her patient's guarded prognosis and she  "wants everything done".      Overview/Hospital Course:  12/14:  Patient overall stable overnight however continues to be tachycardic.  Range of between 01/20/2040.  His alkaline phosphatase has trended upwards.  Appreciate gastroenterology recommendations.  No evidence of active bleeding at this time per PEG colostomy.  Getting CT abdomen and pelvis with contrast for further insight into " alkaline phosphatase and GGT elevation.  Discussed case Dr. rOtez and GI Dr. Bautista. Consulted palliative care to discuss goals of care. Discussed with Amanda Colmenares (palliative) and mother does not want to consider what to do if his condition worsens    12/15:  Patient's condition continues being guarded.  Appreciate infectious disease recs.  Zyvox and fluconazole was stopped.  His CRP and ESR are high.  Following cultures.  Continues to be on Levaquin and Unasyn.  CT angiogram yesterday ruled out pulmonary embolism and however confirmed atelectasis some pulmonary effusion in the setting postoperative status.  Unfortunately does not have lot options for positive pressure as he cannot follow directions to use incentive spirometry and not able to get up and move around to mobilize fluid.  Discussed case with mother.  Continues to have elevated phosphatase.  No structural reason this imaging.  Will be going for upper endoscopy today.    12/16:  No significant change over night.  Patient does appear to be more alert and moving his eyes looking around today.  Mom has no new concerns. WBC stable from yesterday.  Heart rate appears to be similar stable around 130. Discussed with Infectious Disease who continues to follow patient and is watching for culture results.  No changes to the antibiotic therapy at this time.    Interval History: No significant change over night.  Patient does appear to be more alert and moving his eyes looking around today.  Mom has no new concerns. WBC stable from yesterday.  Heart rate appears to be similar stable around 130. Discussed with Infectious Disease who continues to follow patient and is watching for culture results.  No changes to the antibiotic therapy at this time.    Review of Systems   Unable to perform ROS: Patient nonverbal (mother does not think he is in pain and she says he seems more alert today)     Objective:     Vital Signs (Most Recent):  Temp: (!) 100.8 °F (38.2 °C)  (12/16/20 1154)  Pulse: (!) 121 (12/16/20 1247)  Resp: 18 (12/16/20 1154)  BP: 120/85 (12/16/20 1154)  SpO2: 96 % (12/16/20 1154) Vital Signs (24h Range):  Temp:  [99 °F (37.2 °C)-100.8 °F (38.2 °C)] 100.8 °F (38.2 °C)  Pulse:  [121-149] 121  Resp:  [18-20] 18  SpO2:  [86 %-100 %] 96 %  BP: (118-132)/(76-85) 120/85     Weight: 43 kg (94 lb 12.8 oz)  Body mass index is 22.86 kg/m².    Intake/Output Summary (Last 24 hours) at 12/16/2020 1337  Last data filed at 12/16/2020 0512  Gross per 24 hour   Intake 2225 ml   Output 2250 ml   Net -25 ml      Physical Exam  Vitals signs and nursing note reviewed.   Constitutional:       General: He is not in acute distress.     Appearance: He is well-developed. He is ill-appearing. He is not diaphoretic.      Comments: At mental baseline   HENT:      Head: Normocephalic and atraumatic.      Nose: Nose normal.   Eyes:      General:         Right eye: No discharge.         Left eye: No discharge.      Conjunctiva/sclera: Conjunctivae normal.      Pupils: Pupils are equal, round, and reactive to light.      Comments: No purposeful eye movement     Neck:      Thyroid: No thyromegaly.      Trachea: Tracheostomy present.   Cardiovascular:      Rate and Rhythm: Regular rhythm. Tachycardia present.      Heart sounds: Normal heart sounds. No murmur.      Comments: Cool extremities  Pulmonary:      Effort: Pulmonary effort is normal. No respiratory distress.      Breath sounds: Normal breath sounds. No wheezing.   Abdominal:      General: There is no distension.      Palpations: Abdomen is soft.      Comments: PEG and colostomy in place. No evidence of bleeding   Genitourinary:     Comments: catheter  Skin:     General: Skin is warm.      Findings: No rash.   Neurological:      Mental Status: He is oriented to person, place, and time.   Psychiatric:         Behavior: Behavior normal.         Significant Labs:   CBC:   Recent Labs   Lab 12/14/20  1909 12/15/20  0944 12/16/20  1100   WBC  14.69* 13.90* 13.85*   HGB 9.7* 8.9* 8.6*   HCT 31.6* 29.5* 28.2*   * 408* 486*       Significant Imaging: I have reviewed all pertinent imaging results/findings within the past 24 hours.      Assessment/Plan:      * Sepsis  This patient does have evidence of infective focus  My overall impression is sepsis. Vital signs were reviewed and noted in progress note.  Antibiotics given-   Antibiotics (From admission, onward)    Start     Stop Route Frequency Ordered    12/14/20 1630  ampicillin-sulbactam 3 g in sodium chloride 0.9 % 100 mL IVPB (ready to mix system)      -- IV Every 6 hours (non-standard times) 12/14/20 1521    12/14/20 1630  levoFLOXacin 750 mg/150 mL IVPB 750 mg      -- IV Every 24 hours (non-standard times) 12/14/20 1521    12/13/20 2100  mupirocin 2 % ointment      12/18 2059 Nasl 2 times daily 12/13/20 1649        Cultures were taken-   Microbiology Results (last 7 days)     Procedure Component Value Units Date/Time    Culture, Respiratory with Gram Stain [[id number]]  (Abnormal)  (Susceptibility) Collected: 12/13/20 1855    Order Status: Completed Specimen: Respiratory from Sputum Updated: 12/16/20 1127     Respiratory Culture No S aureus or Pseudomonas isolated.      SERRATIA MARCESCENS  Moderate       Gram Stain (Respiratory) <10 epithelial cells per low power field.     Gram Stain (Respiratory) Few yeast     Gram Stain (Respiratory) Rare Gram negative rods     Gram Stain (Respiratory) Few WBC's    Aerobic culture [[id number]]  (Abnormal) Collected: 12/14/20 1208    Order Status: Completed Specimen: Wound from Buttocks, Right Updated: 12/16/20 0934     Aerobic Bacterial Culture GRAM NEGATIVE DIETER  Moderate  Identification and susceptibility pending      Narrative:      Right ischium stage 4 pressure injury    Blood culture x two cultures. Draw prior to antibiotics. [[id number]] Collected: 12/13/20 1536    Order Status: Completed Specimen: Blood from Peripheral, Antecubital, Left Updated:  12/16/20 0613     Blood Culture, Routine No Growth to date      No Growth to date      No Growth to date    Narrative:      Aerobic and anaerobic    Blood culture x two cultures. Draw prior to antibiotics. [[id number]] Collected: 12/13/20 1530    Order Status: Completed Specimen: Blood from Peripheral, Antecubital, Left Updated: 12/16/20 0613     Blood Culture, Routine No Growth to date      No Growth to date      No Growth to date    Narrative:      Aerobic and anaerobic        Latest lactate reviewed, they are-  Recent Labs   Lab 12/14/20  1909   LACTATE 1.1     Source- Suspected GI    Source control Achieved by- Iv Abx  ID, GI,  and General Surgery consulted. Appreciate recs. Seems to be stabilizing        Atelectasis  In post-op period. Frequent repositioning. Cannot do Incentive spirometry    Candiduria  ID advised no tx. Iglesias removed.       Elevated alkaline phosphatase level  GGT also high. Continues to trend up. AST/ALT mildly elevated.  following    Abnormal EKG  Cardiology felt no ischemic etiology and artifact likely       Sacral decubitus ulcer  Appreciate wound care assistance. 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 Recent cx negative for this      Hypertensive urgency  Monitor  Telemetry          Gastrointestinal hemorrhage  Hemoglobin with minimal change at this time (near baseline).  GI recs after EGD   1. IV Protonix BID  Then switch to via PEG BIX x 2 months  2. Carafate 1g suspension via PEG x 2 weeks  3. Restart tube feeds  4. Loosen external bumper to 3.5cm from skin    Severe malnutrition  Appreciate dietary recs      Status post  colostomy  S/p recent Colostomy placement on 10/29/2020 followed by Revision due to Colostomy prolapse on 12/08/2020. Dr. Ortez who did surgery is aware of admit. Dr. Roa had no recommendations at this time. Atelectasis in post-op period      Status post tracheostomy  Stable.      Seizure disorder  Patient currently NPO- Will give home dose phenobarbital Iv- Discussed with pharmacy      Sinus tachycardia  Secondary to underlying condition/atelectasis    Cerebral palsy  Underlying complicating factor to current medical course.  He is at baseline is nonverbal and minimally responsive.  Discussion with mom and also have consulted palliative care.  Continues to be full code.        VTE Risk Mitigation (From admission, onward)         Ordered     IP VTE HIGH RISK PATIENT  Once      12/13/20 1726     Place SHANELLE hose  Until discontinued      12/13/20 1726     Place SHANELLE hose  Until discontinued      12/13/20 1658                Discharge Planning   ROCÍO:      Code Status: Full Code   Is the patient medically ready for discharge?:     Reason for patient still in hospital (select all that apply): Patient trending condition  Discharge Plan A: Long-term acute care facility (LTAC)                  Layo Beck DO  Department of Hospital Medicine   [facility]

## 2020-12-16 NOTE — SUBJECTIVE & OBJECTIVE
Interval History: 25 year old man with cerebral palsy ,anemia.  All cultures reviewed .  -12/13- resp culture- serratia   12/10- Resp- Acinetobacter   E coli - -unasyn resistant     WBC   Component      Latest Ref Rng & Units 12/15/2020 12/14/2020 12/14/2020            7:09 PM  7:03 AM   WBC      3.90 - 12.70 K/uL 13.90 (H) 14.69 (H) 15.87 (H)     Review of Systems   Unable to perform ROS: Patient nonverbal     Objective:     Vital Signs (Most Recent):  Temp: 99.2 °F (37.3 °C) (12/15/20 1602)  Pulse: (!) 127 (12/15/20 1700)  Resp: 20 (12/15/20 1602)  BP: 120/80 (12/15/20 1602)  SpO2: 100 % (12/15/20 1602) Vital Signs (24h Range):  Temp:  [99 °F (37.2 °C)-99.7 °F (37.6 °C)] 99.2 °F (37.3 °C)  Pulse:  [122-149] 127  Resp:  [18-20] 20  SpO2:  [91 %-100 %] 100 %  BP: (107-135)/(56-80) 120/80     Weight: 39.7 kg (87 lb 8.4 oz)  Body mass index is 21.1 kg/m².    Estimated Creatinine Clearance: 107.5 mL/min (based on SCr of 0.6 mg/dL).    Physical Exam  Vitals signs and nursing note reviewed.   Constitutional:       General: He is not in acute distress.     Appearance: He is well-developed. He is ill-appearing. He is not diaphoretic.      Comments: At mental baseline   HENT:      Head: Normocephalic and atraumatic.      Nose: Nose normal.   Eyes:      General:         Right eye: No discharge.         Left eye: No discharge.      Conjunctiva/sclera: Conjunctivae normal.      Pupils: Pupils are equal, round, and reactive to light.      Comments: No purposeful eye movement     Neck:      Thyroid: No thyromegaly.      Trachea: Tracheostomy present.   Cardiovascular:      Rate and Rhythm: Regular rhythm. Tachycardia present.      Heart sounds: Normal heart sounds. No murmur.      Comments: Cool extremities  Pulmonary:      Effort: Pulmonary effort is normal. No respiratory distress.      Breath sounds: Normal breath sounds. No wheezing.   Abdominal:      General: There is no distension.      Palpations: Abdomen is soft.       Comments: PEG and colostomy in place. No evidence of bleeding   Genitourinary:     Comments: catheter  Skin:     General: Skin is warm.      Findings: No rash.   Neurological:      Mental Status: Mental status is at baseline.         Significant Labs:   Blood Culture:   Recent Labs   Lab 11/13/20  2332 12/06/20  1345 12/06/20  1405 12/13/20  1530 12/13/20  1536   LABBLOO No growth after 5 days.  Gram stain aer bottle: Gram positive cocci in chains resembling Strep   Results called to and read back by: Zee Taylor RN 11/14/2020  22:56  ENTEROCOCCUS FAECIUM VRE* No growth after 5 days. No growth after 5 days. No Growth to date  No Growth to date No Growth to date  No Growth to date     BMP:   Recent Labs   Lab 12/14/20  0703 12/14/20  1909   * 83    141   K 3.6 3.7    109   CO2 23 24   BUN 36* 29*   CREATININE 0.7 0.6   CALCIUM 7.8* 7.6*   MG 2.0  --      CBC:   Recent Labs   Lab 12/14/20  0703 12/14/20  1909 12/15/20  0944   WBC 15.87* 14.69* 13.90*   HGB 9.8* 9.7* 8.9*   HCT 31.9* 31.6* 29.5*   * 451* 408*     CMP:   Recent Labs   Lab 12/14/20  0703 12/14/20  1909    141   K 3.6 3.7    109   CO2 23 24   * 83   BUN 36* 29*   CREATININE 0.7 0.6   CALCIUM 7.8* 7.6*   PROT 5.5* 5.4*   ALBUMIN 1.4* 1.3*   BILITOT 0.7 0.3   ALKPHOS 1,874* 2,100*   AST 68* 82*   ALT 33 46*   ANIONGAP 12 8   EGFRNONAA >60 >60     Respiratory Culture:   Recent Labs   Lab 08/31/20  2157 09/06/20  1103 10/03/20  1749 12/10/20  0514 12/13/20  1855   GSRESP <10 epithelial cells per low power field.  Rare WBC's  Rare Gram negative rods >10 epithelial cells per low power field  Rare WBC's  Rare Gram negative rods <10 epithelial cells per low power field.  Rare WBC's  Rare budding yeast <10 epithelial cells per low power field.  Moderate WBC's  Moderate Gram negative rods  Rare Gram positive cocci  Rare Gram negative diplococci <10 epithelial cells per low power field.  Few yeast  Rare  Gram negative rods  Few WBC's   RESPIRATORYC No S aureus or Pseudomonas isolated.  SERRATIA MARCESCENS  Moderate  * No S aureus or Pseudomonas isolated.  SERRATIA MARCESCENS  Moderate  * No S aureus or Pseudomonas isolated.  SERRATIA MARCESCENS  Few  * No S aureus or Pseudomonas isolated.  ACINETOBACTER BAUMANNII   Many  *  ESCHERICHIA COLI  Moderate  * SERRATIA MARCESCENS  Moderate  Susceptibility pending  *     Wound Culture: No results for input(s): LABAERO in the last 4320 hours.  All pertinent labs within the past 24 hours have been reviewed.    Significant Imaging: I have reviewed all pertinent imaging results/findings within the past 24 hours.

## 2020-12-16 NOTE — PLAN OF CARE
Ongoing (interventions implemented as appropriate)  Pt non verbal  Pt remained afebrile throughout this shift.    Pt remained free of falls this shift.   Plan of care reviewed. Family verbalizes understanding.   Pt moving/turing by staff. Frequent weight shifting done  Patient sinus rhythm on monitor.   Bed low, side rails up x 2, wheels locked, call light in reach.   Hourly rounding completed.   Will continue to monitor.

## 2020-12-16 NOTE — ASSESSMENT & PLAN NOTE
Underlying complicating factor to current medical course.  He is at baseline is nonverbal and minimally responsive.  Discussion with mom and also have consulted palliative care.  Continues to be full code.

## 2020-12-16 NOTE — ASSESSMENT & PLAN NOTE
Will use Unasyn for the  , contact isolation , continue Levaquin, stop zyvox  for now     Will follow repeat blood cultures.  12/15- will continue Unasyn

## 2020-12-16 NOTE — SIGNIFICANT EVENT
Deterioration alert received - likely d/t documented O2 saturation of 86% and HR of 149 - pt currently on trach collar at 4L, 27% FIO2. Pt seen and examined at bedside and case discussed with primary nurse. HR now 136 which is similar to previous documented readings. O2 saturation rechecked and found to be 92% at this time. Resting quietly in bed, minimally responsive - at baseline; NADN. No indication for higher level of care.

## 2020-12-16 NOTE — ASSESSMENT & PLAN NOTE
He was seen -10/28/2020- the plan was made to complete 6 weeks of therapy with Cefepime, Flagyl and Vancomycin   Will send ESR and CRP.    12/15- will continue wound care , continue Unasyn/Levaquin .  He was initially treated for osteomyelitis in the past .

## 2020-12-16 NOTE — PLAN OF CARE
Aox self. Unable to verbalize needs. Calm & cooperative at this time.  POC reviewed w/ mother at bedside; interventions implemented as appropriate.   WCN consulted for multiple skin issues.  On 4L O2 via trach collar, tolerating well.  Receiving IV fluid & IV abx.   No s/s of pain.   PEG, rizvi & colostomy all in place & patent.  Progressive mobility - severely impaired; unable to move extremities.   Frequent position changes provided.    NADN. Resting quietly in bed. Hourly rounding complete.   All safety measures remain in place. Mother remains at bedside. Educated on using call light. Free of falls. SR up x2; bed low & locked. Call light w/in reach.   Will continue to monitor throughout shift.

## 2020-12-16 NOTE — ASSESSMENT & PLAN NOTE
This patient does have evidence of infective focus  My overall impression is sepsis. Vital signs were reviewed and noted in progress note.  Antibiotics given-   Antibiotics (From admission, onward)    Start     Stop Route Frequency Ordered    12/14/20 1630  ampicillin-sulbactam 3 g in sodium chloride 0.9 % 100 mL IVPB (ready to mix system)      -- IV Every 6 hours (non-standard times) 12/14/20 1521    12/14/20 1630  levoFLOXacin 750 mg/150 mL IVPB 750 mg      -- IV Every 24 hours (non-standard times) 12/14/20 1521    12/13/20 2100  mupirocin 2 % ointment      12/18 2059 Nasl 2 times daily 12/13/20 1649        Cultures were taken-   Microbiology Results (last 7 days)     Procedure Component Value Units Date/Time    Culture, Respiratory with Gram Stain [[id number]]  (Abnormal)  (Susceptibility) Collected: 12/13/20 1855    Order Status: Completed Specimen: Respiratory from Sputum Updated: 12/16/20 1127     Respiratory Culture No S aureus or Pseudomonas isolated.      SERRATIA MARCESCENS  Moderate       Gram Stain (Respiratory) <10 epithelial cells per low power field.     Gram Stain (Respiratory) Few yeast     Gram Stain (Respiratory) Rare Gram negative rods     Gram Stain (Respiratory) Few WBC's    Aerobic culture [[id number]]  (Abnormal) Collected: 12/14/20 1208    Order Status: Completed Specimen: Wound from Buttocks, Right Updated: 12/16/20 0934     Aerobic Bacterial Culture GRAM NEGATIVE DIETER  Moderate  Identification and susceptibility pending      Narrative:      Right ischium stage 4 pressure injury    Blood culture x two cultures. Draw prior to antibiotics. [[id number]] Collected: 12/13/20 1536    Order Status: Completed Specimen: Blood from Peripheral, Antecubital, Left Updated: 12/16/20 0613     Blood Culture, Routine No Growth to date      No Growth to date      No Growth to date    Narrative:      Aerobic and anaerobic    Blood culture x two cultures. Draw prior to antibiotics. [[id number]] Collected: 12/13/20  1530    Order Status: Completed Specimen: Blood from Peripheral, Antecubital, Left Updated: 12/16/20 0613     Blood Culture, Routine No Growth to date      No Growth to date      No Growth to date    Narrative:      Aerobic and anaerobic        Latest lactate reviewed, they are-  Recent Labs   Lab 12/14/20  1909   LACTATE 1.1     Source- Suspected GI    Source control Achieved by- Iv Abx  ID, GI,  and General Surgery consulted. Appreciate recs. Seems to be stabilizing

## 2020-12-16 NOTE — PROGRESS NOTES
[facility] -   Infectious Disease  Progress Note    Patient Name: Glen Moscoso  MRN: [id number]  Admission Date: 12/13/2020  Length of Stay: 2 days  Attending Physician: Layo Beck DO  Primary Care Provider: Yadi Lawson MD    Isolation Status: No active isolations  Assessment/Plan:      * Sepsis  Will continue Unasyn.,Levaquin   Follow drug sensitivity of serratia .  Will follow CBC in AM      Sacral decubitus ulcer  He was seen -10/28/2020- the plan was made to complete 6 weeks of therapy with Cefepime, Flagyl and Vancomycin   Will send ESR and CRP.    12/15- will continue wound care , continue Unasyn/Levaquin .  He was initially treated for osteomyelitis in the past .        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  12/15- will continue Unasyn        Gastrointestinal hemorrhage  GI follow up     Status post tracheostomy  Continue supportive  Care         Seizure disorder  Will resume home meds    Cerebral palsy  supportive care         Anticipated Disposition:     Thank you for your consult. I will follow-up with patient. Please contact us if you have any additional questions.    Kar Morales MD  Infectious Disease  [facility]    Subjective:     Principal Problem:Sepsis    HPI:   25 year old  male with a PMH of seizures, colitis, acid reflux, severe PCM, Peg tube, pelvic osteo, cerebral palsy, anemia, prtureoctocolitis who was recently discharged from the Hospital -12./11/2020.  All cultures reviewed -  12/10- Resp cultures-  ACINETOBACTER BAUMANNII    Many   Susceptibility pending   Abnormal        ESCHERICHIA COLI   Moderate   Susceptibility pending      Blood culture-  11/13- VRE.  Component      Latest Ref Rng & Units 12/14/2020 12/14/2020           7:03 AM  5:22 AM   WBC      3.90 - 12.70 K/uL  15.87 (H)    CT ABDOMEN -   Moderate sized bilateral pleural effusions with adjacent atelectasis/consolidation in the bilateral lower lobes.  Moderate anasarca, worse from the comparison study.  Mild ascites.  Findings are concerning for 3rd spacing, possibly related to renal, hepatic or cardiac dysfunctio    Large bilateral decubitus ulcers with underlying bony involvement in the ischial spine/ischial tuberosity region.  Clinical correlation is advised.     7.  Marked deformity to both hip joints with synovial hypertrophy.     Interval History: 25 year old man with cerebral palsy ,anemia.  All cultures reviewed .  -12/13- resp culture- serratia   12/10- Resp- Acinetobacter   E coli - -unasyn resistant     WBC   Component      Latest Ref Rng & Units 12/15/2020 12/14/2020 12/14/2020            7:09 PM  7:03 AM   WBC      3.90 - 12.70 K/uL 13.90 (H) 14.69 (H) 15.87 (H)     Review of Systems   Unable to perform ROS: Patient nonverbal     Objective:     Vital Signs (Most Recent):  Temp: 99.2 °F (37.3 °C) (12/15/20 1602)  Pulse: (!) 127 (12/15/20 1700)  Resp: 20 (12/15/20 1602)  BP: 120/80 (12/15/20 1602)  SpO2: 100 % (12/15/20 1602) Vital Signs (24h Range):  Temp:  [99 °F (37.2 °C)-99.7 °F (37.6 °C)] 99.2 °F (37.3 °C)  Pulse:  [122-149] 127  Resp:  [18-20] 20  SpO2:  [91 %-100 %] 100 %  BP: (107-135)/(56-80) 120/80     Weight: 39.7 kg (87 lb 8.4 oz)  Body mass index is 21.1 kg/m².    Estimated Creatinine Clearance: 107.5 mL/min (based on SCr of 0.6 mg/dL).    Physical Exam  Vitals signs and nursing note reviewed.   Constitutional:       General: He is not in acute distress.     Appearance: He is well-developed. He is ill-appearing. He is not diaphoretic.      Comments: At mental baseline   HENT:      Head: Normocephalic and atraumatic.      Nose: Nose normal.   Eyes:      General:         Right eye: No discharge.         Left eye: No discharge.      Conjunctiva/sclera: Conjunctivae normal.      Pupils: Pupils are  equal, round, and reactive to light.      Comments: No purposeful eye movement     Neck:      Thyroid: No thyromegaly.      Trachea: Tracheostomy present.   Cardiovascular:      Rate and Rhythm: Regular rhythm. Tachycardia present.      Heart sounds: Normal heart sounds. No murmur.      Comments: Cool extremities  Pulmonary:      Effort: Pulmonary effort is normal. No respiratory distress.      Breath sounds: Normal breath sounds. No wheezing.   Abdominal:      General: There is no distension.      Palpations: Abdomen is soft.      Comments: PEG and colostomy in place. No evidence of bleeding   Genitourinary:     Comments: catheter  Skin:     General: Skin is warm.      Findings: No rash.   Neurological:      Mental Status: Mental status is at baseline.         Significant Labs:   Blood Culture:   Recent Labs   Lab 11/13/20  2332 12/06/20  1345 12/06/20  1405 12/13/20  1530 12/13/20  1536   LABBLOO No growth after 5 days.  Gram stain aer bottle: Gram positive cocci in chains resembling Strep   Results called to and read back by: Zee Taylor RN 11/14/2020  22:56  ENTEROCOCCUS FAECIUM VRE* No growth after 5 days. No growth after 5 days. No Growth to date  No Growth to date No Growth to date  No Growth to date     BMP:   Recent Labs   Lab 12/14/20  0703 12/14/20  1909   * 83    141   K 3.6 3.7    109   CO2 23 24   BUN 36* 29*   CREATININE 0.7 0.6   CALCIUM 7.8* 7.6*   MG 2.0  --      CBC:   Recent Labs   Lab 12/14/20  0703 12/14/20  1909 12/15/20  0944   WBC 15.87* 14.69* 13.90*   HGB 9.8* 9.7* 8.9*   HCT 31.9* 31.6* 29.5*   * 451* 408*     CMP:   Recent Labs   Lab 12/14/20  0703 12/14/20  1909    141   K 3.6 3.7    109   CO2 23 24   * 83   BUN 36* 29*   CREATININE 0.7 0.6   CALCIUM 7.8* 7.6*   PROT 5.5* 5.4*   ALBUMIN 1.4* 1.3*   BILITOT 0.7 0.3   ALKPHOS 1,874* 2,100*   AST 68* 82*   ALT 33 46*   ANIONGAP 12 8   EGFRNONAA >60 >60     Respiratory Culture:   Recent  Labs   Lab 08/31/20  2157 09/06/20  1103 10/03/20  1749 12/10/20  0514 12/13/20  1855   GSRESP <10 epithelial cells per low power field.  Rare WBC's  Rare Gram negative rods >10 epithelial cells per low power field  Rare WBC's  Rare Gram negative rods <10 epithelial cells per low power field.  Rare WBC's  Rare budding yeast <10 epithelial cells per low power field.  Moderate WBC's  Moderate Gram negative rods  Rare Gram positive cocci  Rare Gram negative diplococci <10 epithelial cells per low power field.  Few yeast  Rare Gram negative rods  Few WBC's   RESPIRATORYC No S aureus or Pseudomonas isolated.  SERRATIA MARCESCENS  Moderate  * No S aureus or Pseudomonas isolated.  SERRATIA MARCESCENS  Moderate  * No S aureus or Pseudomonas isolated.  SERRATIA MARCESCENS  Few  * No S aureus or Pseudomonas isolated.  ACINETOBACTER BAUMANNII   Many  *  ESCHERICHIA COLI  Moderate  * SERRATIA MARCESCENS  Moderate  Susceptibility pending  *     Wound Culture: No results for input(s): LABAERO in the last 4320 hours.  All pertinent labs within the past 24 hours have been reviewed.    Significant Imaging: I have reviewed all pertinent imaging results/findings within the past 24 hours.

## 2020-12-16 NOTE — CONSULTS
New consult noted on this 24 y/o M patient due to conscern of RRT for blistering and skin breakdown around TRACH. Please see progress noted dated 12/16 @1005am for findings and recommendations.

## 2020-12-17 PROBLEM — J90 BILATERAL PLEURAL EFFUSION: Status: ACTIVE | Noted: 2020-12-17

## 2020-12-17 LAB
ALBUMIN SERPL BCP-MCNC: 1.2 G/DL (ref 3.5–5.2)
ALLENS TEST: ABNORMAL
ALP SERPL-CCNC: 839 U/L (ref 55–135)
ALT SERPL W/O P-5'-P-CCNC: 14 U/L (ref 10–44)
ANION GAP SERPL CALC-SCNC: 10 MMOL/L (ref 8–16)
ANION GAP SERPL CALC-SCNC: 10 MMOL/L (ref 8–16)
AST SERPL-CCNC: 18 U/L (ref 10–40)
BASOPHILS # BLD AUTO: 0.02 K/UL (ref 0–0.2)
BASOPHILS NFR BLD: 0.1 % (ref 0–1.9)
BILIRUB SERPL-MCNC: 0.2 MG/DL (ref 0.1–1)
BUN SERPL-MCNC: 12 MG/DL (ref 6–20)
BUN SERPL-MCNC: 12 MG/DL (ref 6–20)
CALCIUM SERPL-MCNC: 7 MG/DL (ref 8.7–10.5)
CALCIUM SERPL-MCNC: 7.1 MG/DL (ref 8.7–10.5)
CHLORIDE SERPL-SCNC: 119 MMOL/L (ref 95–110)
CHLORIDE SERPL-SCNC: 119 MMOL/L (ref 95–110)
CO2 SERPL-SCNC: 21 MMOL/L (ref 23–29)
CO2 SERPL-SCNC: 22 MMOL/L (ref 23–29)
CREAT SERPL-MCNC: 0.7 MG/DL (ref 0.5–1.4)
CREAT SERPL-MCNC: 0.7 MG/DL (ref 0.5–1.4)
DELSYS: ABNORMAL
DIFFERENTIAL METHOD: ABNORMAL
EOSINOPHIL # BLD AUTO: 0 K/UL (ref 0–0.5)
EOSINOPHIL NFR BLD: 0 % (ref 0–8)
ERYTHROCYTE [DISTWIDTH] IN BLOOD BY AUTOMATED COUNT: 16.9 % (ref 11.5–14.5)
EST. GFR  (AFRICAN AMERICAN): >60 ML/MIN/1.73 M^2
EST. GFR  (AFRICAN AMERICAN): >60 ML/MIN/1.73 M^2
EST. GFR  (NON AFRICAN AMERICAN): >60 ML/MIN/1.73 M^2
EST. GFR  (NON AFRICAN AMERICAN): >60 ML/MIN/1.73 M^2
FINAL PATHOLOGIC DIAGNOSIS: NORMAL
FIO2: 35
FLOW: 8
GLUCOSE SERPL-MCNC: 79 MG/DL (ref 70–110)
GLUCOSE SERPL-MCNC: 86 MG/DL (ref 70–110)
GROSS: NORMAL
HCO3 UR-SCNC: 20.3 MMOL/L (ref 24–28)
HCT VFR BLD AUTO: 25.8 % (ref 40–54)
HGB BLD-MCNC: 8 G/DL (ref 14–18)
IMM GRANULOCYTES # BLD AUTO: 0.08 K/UL (ref 0–0.04)
IMM GRANULOCYTES NFR BLD AUTO: 0.5 % (ref 0–0.5)
LYMPHOCYTES # BLD AUTO: 1.4 K/UL (ref 1–4.8)
LYMPHOCYTES NFR BLD: 8.6 % (ref 18–48)
Lab: NORMAL
MCH RBC QN AUTO: 28.4 PG (ref 27–31)
MCHC RBC AUTO-ENTMCNC: 31 G/DL (ref 32–36)
MCV RBC AUTO: 92 FL (ref 82–98)
MODE: ABNORMAL
MONOCYTES # BLD AUTO: 1.4 K/UL (ref 0.3–1)
MONOCYTES NFR BLD: 8.7 % (ref 4–15)
NEUTROPHILS # BLD AUTO: 12.9 K/UL (ref 1.8–7.7)
NEUTROPHILS NFR BLD: 82.1 % (ref 38–73)
NRBC BLD-RTO: 0 /100 WBC
PCO2 BLDA: 29.8 MMHG (ref 35–45)
PH SMN: 7.44 [PH] (ref 7.35–7.45)
PLATELET # BLD AUTO: 454 K/UL (ref 150–350)
PMV BLD AUTO: 9.5 FL (ref 9.2–12.9)
PO2 BLDA: 62 MMHG (ref 80–100)
POC BE: -4 MMOL/L
POC SATURATED O2: 93 % (ref 95–100)
POTASSIUM SERPL-SCNC: 2.3 MMOL/L (ref 3.5–5.1)
POTASSIUM SERPL-SCNC: 2.5 MMOL/L (ref 3.5–5.1)
PROT SERPL-MCNC: 4.7 G/DL (ref 6–8.4)
RBC # BLD AUTO: 2.82 M/UL (ref 4.6–6.2)
SAMPLE: ABNORMAL
SITE: ABNORMAL
SODIUM SERPL-SCNC: 150 MMOL/L (ref 136–145)
SODIUM SERPL-SCNC: 151 MMOL/L (ref 136–145)
WBC # BLD AUTO: 15.69 K/UL (ref 3.9–12.7)

## 2020-12-17 PROCEDURE — 63600175 PHARM REV CODE 636 W HCPCS: Performed by: INTERNAL MEDICINE

## 2020-12-17 PROCEDURE — 21400001 HC TELEMETRY ROOM

## 2020-12-17 PROCEDURE — 25000242 PHARM REV CODE 250 ALT 637 W/ HCPCS: Performed by: INTERNAL MEDICINE

## 2020-12-17 PROCEDURE — 25000003 PHARM REV CODE 250: Performed by: INTERNAL MEDICINE

## 2020-12-17 PROCEDURE — S5010 5% DEXTROSE AND 0.45% SALINE: HCPCS | Performed by: FAMILY MEDICINE

## 2020-12-17 PROCEDURE — 80048 BASIC METABOLIC PNL TOTAL CA: CPT

## 2020-12-17 PROCEDURE — 99233 PR SUBSEQUENT HOSPITAL CARE,LEVL III: ICD-10-PCS | Mod: ,,, | Performed by: FAMILY MEDICINE

## 2020-12-17 PROCEDURE — 87077 CULTURE AEROBIC IDENTIFY: CPT

## 2020-12-17 PROCEDURE — 82803 BLOOD GASES ANY COMBINATION: CPT

## 2020-12-17 PROCEDURE — 99232 PR SUBSEQUENT HOSPITAL CARE,LEVL II: ICD-10-PCS | Mod: ,,, | Performed by: INTERNAL MEDICINE

## 2020-12-17 PROCEDURE — 94761 N-INVAS EAR/PLS OXIMETRY MLT: CPT

## 2020-12-17 PROCEDURE — 99232 SBSQ HOSP IP/OBS MODERATE 35: CPT | Mod: ,,, | Performed by: INTERNAL MEDICINE

## 2020-12-17 PROCEDURE — 25000003 PHARM REV CODE 250: Performed by: FAMILY MEDICINE

## 2020-12-17 PROCEDURE — 99233 SBSQ HOSP IP/OBS HIGH 50: CPT | Mod: ,,, | Performed by: FAMILY MEDICINE

## 2020-12-17 PROCEDURE — 36600 WITHDRAWAL OF ARTERIAL BLOOD: CPT

## 2020-12-17 PROCEDURE — 25000003 PHARM REV CODE 250: Performed by: NURSE PRACTITIONER

## 2020-12-17 PROCEDURE — 85025 COMPLETE CBC W/AUTO DIFF WBC: CPT

## 2020-12-17 PROCEDURE — C9113 INJ PANTOPRAZOLE SODIUM, VIA: HCPCS | Performed by: INTERNAL MEDICINE

## 2020-12-17 PROCEDURE — 99900026 HC AIRWAY MAINTENANCE (STAT)

## 2020-12-17 PROCEDURE — 94640 AIRWAY INHALATION TREATMENT: CPT

## 2020-12-17 PROCEDURE — 80053 COMPREHEN METABOLIC PANEL: CPT

## 2020-12-17 PROCEDURE — 87070 CULTURE OTHR SPECIMN AEROBIC: CPT

## 2020-12-17 PROCEDURE — 87186 SC STD MICRODIL/AGAR DIL: CPT

## 2020-12-17 PROCEDURE — 27000221 HC OXYGEN, UP TO 24 HOURS

## 2020-12-17 PROCEDURE — 27100171 HC OXYGEN HIGH FLOW UP TO 24 HOURS

## 2020-12-17 PROCEDURE — 99900035 HC TECH TIME PER 15 MIN (STAT)

## 2020-12-17 PROCEDURE — 27200966 HC CLOSED SUCTION SYSTEM

## 2020-12-17 PROCEDURE — 87205 SMEAR GRAM STAIN: CPT

## 2020-12-17 RX ORDER — FUROSEMIDE 10 MG/ML
40 INJECTION INTRAMUSCULAR; INTRAVENOUS
Status: DISCONTINUED | OUTPATIENT
Start: 2020-12-17 | End: 2020-12-18

## 2020-12-17 RX ORDER — DEXTROSE MONOHYDRATE, SODIUM CHLORIDE, AND POTASSIUM CHLORIDE 50; 1.49; 4.5 G/1000ML; G/1000ML; G/1000ML
INJECTION, SOLUTION INTRAVENOUS CONTINUOUS
Status: DISCONTINUED | OUTPATIENT
Start: 2020-12-17 | End: 2020-12-19

## 2020-12-17 RX ORDER — ALBUTEROL SULFATE 0.83 MG/ML
2.5 SOLUTION RESPIRATORY (INHALATION) EVERY 6 HOURS
Status: DISCONTINUED | OUTPATIENT
Start: 2020-12-17 | End: 2020-12-19

## 2020-12-17 RX ORDER — DEXTROSE MONOHYDRATE AND SODIUM CHLORIDE 5; .45 G/100ML; G/100ML
INJECTION, SOLUTION INTRAVENOUS CONTINUOUS
Status: DISCONTINUED | OUTPATIENT
Start: 2020-12-17 | End: 2020-12-17

## 2020-12-17 RX ORDER — POTASSIUM CHLORIDE 20 MEQ/1
40 TABLET, EXTENDED RELEASE ORAL ONCE
Status: COMPLETED | OUTPATIENT
Start: 2020-12-17 | End: 2020-12-17

## 2020-12-17 RX ADMIN — MUPIROCIN: 20 OINTMENT TOPICAL at 10:12

## 2020-12-17 RX ADMIN — MICONAZOLE NITRATE: 2 OINTMENT TOPICAL at 09:12

## 2020-12-17 RX ADMIN — PANTOPRAZOLE SODIUM 40 MG: 40 INJECTION, POWDER, FOR SOLUTION INTRAVENOUS at 09:12

## 2020-12-17 RX ADMIN — MUPIROCIN: 20 OINTMENT TOPICAL at 09:12

## 2020-12-17 RX ADMIN — ALBUTEROL SULFATE 2.5 MG: 2.5 SOLUTION RESPIRATORY (INHALATION) at 07:12

## 2020-12-17 RX ADMIN — FUROSEMIDE 40 MG: 10 INJECTION, SOLUTION INTRAMUSCULAR; INTRAVENOUS at 05:12

## 2020-12-17 RX ADMIN — PANTOPRAZOLE SODIUM 40 MG: 40 INJECTION, POWDER, FOR SOLUTION INTRAVENOUS at 10:12

## 2020-12-17 RX ADMIN — LEVOFLOXACIN 750 MG: 750 INJECTION, SOLUTION INTRAVENOUS at 05:12

## 2020-12-17 RX ADMIN — METOPROLOL TARTRATE 25 MG: 25 TABLET, FILM COATED ORAL at 09:12

## 2020-12-17 RX ADMIN — POTASSIUM BICARBONATE 40 MEQ: 391 TABLET, EFFERVESCENT ORAL at 10:12

## 2020-12-17 RX ADMIN — PHENOBARBITAL SODIUM 100.1 MG: 65 INJECTION INTRAMUSCULAR; INTRAVENOUS at 09:12

## 2020-12-17 RX ADMIN — MICONAZOLE NITRATE: 2 OINTMENT TOPICAL at 10:12

## 2020-12-17 RX ADMIN — METOPROLOL TARTRATE 25 MG: 25 TABLET, FILM COATED ORAL at 10:12

## 2020-12-17 RX ADMIN — SUCRALFATE 1 G: 1 SUSPENSION ORAL at 10:12

## 2020-12-17 RX ADMIN — DEXTROSE AND SODIUM CHLORIDE 100 ML/HR: 5; .45 INJECTION, SOLUTION INTRAVENOUS at 10:12

## 2020-12-17 RX ADMIN — AMPICILLIN SODIUM AND SULBACTAM SODIUM 3 G: 2; 1 INJECTION, POWDER, FOR SOLUTION INTRAMUSCULAR; INTRAVENOUS at 10:12

## 2020-12-17 RX ADMIN — AMPICILLIN SODIUM AND SULBACTAM SODIUM 3 G: 2; 1 INJECTION, POWDER, FOR SOLUTION INTRAMUSCULAR; INTRAVENOUS at 11:12

## 2020-12-17 RX ADMIN — AMPICILLIN SODIUM AND SULBACTAM SODIUM 3 G: 2; 1 INJECTION, POWDER, FOR SOLUTION INTRAMUSCULAR; INTRAVENOUS at 03:12

## 2020-12-17 RX ADMIN — DEXTROSE, SODIUM CHLORIDE, AND POTASSIUM CHLORIDE 50 ML/HR: 5; .45; .15 INJECTION INTRAVENOUS at 09:12

## 2020-12-17 RX ADMIN — AMPICILLIN SODIUM AND SULBACTAM SODIUM 3 G: 2; 1 INJECTION, POWDER, FOR SOLUTION INTRAMUSCULAR; INTRAVENOUS at 04:12

## 2020-12-17 RX ADMIN — POTASSIUM CHLORIDE 40 MEQ: 1500 TABLET, EXTENDED RELEASE ORAL at 09:12

## 2020-12-17 NOTE — PLAN OF CARE
Pt is alert and disoriented x4, pt has express asphsia. VSS. Pt remained free of falls this shift. No observable signs of pain, when pt is at rest, pt found to be turning uncomfortable via facial expressions. Medications administered as ordered. Pt had oxygen delivered to trach  via trach collar, and pt suctioned at the bedside to improve Spo2 sats. Pt is sinus tach, 120's, on monitor. Hourly rounding completed. Pt instructed to call for assistance. POC reviewed. POC needs to be reinforce with the pt. Will continue to monitor.

## 2020-12-17 NOTE — PHYSICIAN QUERY
"PT Name: Glen Moscoso  MR #: [id number]    CAUSE AND EFFECT RELATIONSHIP CLARIFICATION   Zoe Patino, RN, CCDS; [email or web address]    This form is a permanent document in the medical record.     Query Date: December 17, 2020    By submitting this query, we are merely seeking further clarification of documentation.   Please utilize your independent clinical judgment when addressing the question(s) below.    Supporting Clinical Findings Location in Medical Record   UTI (urinary tract infection)  12/6: Cont cefepime?    urine cx 12/6: Candida Tropicalis  10,000 - 49,999 cfu/ml      H&P      Lab   Pt rizvi catheter replaced  ED RN     Provider,   please clarify if there is any clinical correlation between "rizvi catheter" and "UTI".            Are the conditions:    [x  ] Due to or associated with each other   [  ] Unrelated to each other   [  ] Other explanation (Please Specify): ______________   [  ] Clinically Undetermined                                                                               Please document in your progress notes daily for the duration of treatment until resolved and include in your discharge summary.                                                                                                             "

## 2020-12-17 NOTE — ASSESSMENT & PLAN NOTE
S/p recent Colostomy placement on 10/29/2020 followed by Revision due to Colostomy prolapse on 12/08/2020. Atelectasis in post-op period

## 2020-12-17 NOTE — HPI
26 y/o white male with a past medical history of cerebral palsy complicated by seizures, tracheostomy dependent.  I was asked see this patient due to an abnormal CT scan on of the chest which demonstrated white out of the left long.  Patient has a history of chronic tracheostomy and has had a longstanding history of aspiration.  On discussion with his mother this is a recurring problem.  Chest x-ray today demonstrated whiteout of the left lung that was confirmed on CT scan of the chest.    Patient is unable to give a history.  Other medical problems include osteomyelitis of the pelvis  seizures, colitis, acid reflux, severe PCM, Peg/G  tube, pelvic osteo, cerebral palsy, anemia, proctocolitis Recently hospitalized here from 12/06 to 12/12/2020 for evaluation of a malfunctioning colostomy.  Patient was admitted to the hospital on 12/14/2020 for suspected GI bleed. Noted to have tachycardia, coffee ground emesis,  and dark/black stool noted coming from his colostomy.  Chest X Ray on 12/13 looked relatively clear but Chest X Ray today demonstrated white out of the left lung

## 2020-12-17 NOTE — SUBJECTIVE & OBJECTIVE
Interval History: Heart rate has trended down over the last 24 hr which is reassuring.  However, since yesterday his oxygen requirement has increased.  Pending repeat chest x-ray this morning.  Sodium elevated and potassium low this morning.  Replacing potassium through G-tube and rechecking sodium after increasing fluid rate.  He is only been getting 50 mL/hr.  Increasing this to 100 mL per hour and will repeat sodium potassium level 2:00 p.m. this afternoon.  Resuming enteral nutrition with dietary consult. Unable to discuss with mother as she was not present in the room today.  Will round later to speak with her.    Review of Systems   Constitutional: Negative for chills and fever.   Eyes: Negative for visual disturbance.   Respiratory: Negative for cough and shortness of breath.    Cardiovascular: Negative for chest pain.   Gastrointestinal: Negative for abdominal pain.   Neurological: Negative for dizziness.     Objective:     Vital Signs (Most Recent):  Temp: 98.5 °F (36.9 °C) (12/17/20 0826)  Pulse: 107 (12/17/20 0900)  Resp: 20 (12/17/20 0826)  BP: 130/87 (12/17/20 0826)  SpO2: (!) 94 % (12/17/20 0900) Vital Signs (24h Range):  Temp:  [98.5 °F (36.9 °C)-100.8 °F (38.2 °C)] 98.5 °F (36.9 °C)  Pulse:  [104-133] 107  Resp:  [18-24] 20  SpO2:  [86 %-97 %] 94 %  BP: (120-140)/(81-97) 130/87     Weight: 42 kg (92 lb 9.5 oz)  Body mass index is 22.33 kg/m².    Intake/Output Summary (Last 24 hours) at 12/17/2020 1016  Last data filed at 12/17/2020 0600  Gross per 24 hour   Intake 750 ml   Output 1250 ml   Net -500 ml      Physical Exam  Vitals signs and nursing note reviewed.   Constitutional:       General: He is not in acute distress.     Appearance: He is well-developed. He is ill-appearing. He is not diaphoretic.      Comments: At mental baseline   HENT:      Head: Normocephalic and atraumatic.      Nose: Nose normal.   Eyes:      General:         Right eye: No discharge.         Left eye: No discharge.       Conjunctiva/sclera: Conjunctivae normal.      Pupils: Pupils are equal, round, and reactive to light.      Comments: No purposeful eye movement     Neck:      Thyroid: No thyromegaly.      Trachea: Tracheostomy present.   Cardiovascular:      Rate and Rhythm: Regular rhythm. Tachycardia present.      Heart sounds: Normal heart sounds. No murmur.      Comments: Warm extremities  Pulmonary:      Effort: Pulmonary effort is normal. No respiratory distress.      Breath sounds: Normal breath sounds. No wheezing.   Abdominal:      General: There is no distension.      Palpations: Abdomen is soft.      Comments: PEG and colostomy in place. No evidence of bleeding   Skin:     General: Skin is warm.      Findings: No rash.   Neurological:      Mental Status: He is oriented to person, place, and time.   Psychiatric:         Behavior: Behavior normal.         Significant Labs:   Blood Culture: No results for input(s): LABBLOO in the last 48 hours.  BMP:   Recent Labs   Lab 12/17/20  0506   GLU 86   *   K 2.3*   *   CO2 21*   BUN 12   CREATININE 0.7   CALCIUM 7.0*     CBC:   Recent Labs   Lab 12/16/20  1100 12/17/20  0506   WBC 13.85* 15.69*   HGB 8.6* 8.0*   HCT 28.2* 25.8*   * 454*     CMP:   Recent Labs   Lab 12/17/20  0506   *   K 2.3*   *   CO2 21*   GLU 86   BUN 12   CREATININE 0.7   CALCIUM 7.0*   PROT 4.7*   ALBUMIN 1.2*   BILITOT 0.2   ALKPHOS 839*   AST 18   ALT 14   ANIONGAP 10   EGFRNONAA >60       Significant Imaging: I have reviewed all pertinent imaging results/findings within the past 24 hours.

## 2020-12-17 NOTE — CONSULTS
[facility] -   Adult Nutrition  Consult Note    SUMMARY     Recommendations    Recommendation:   1. When medically appropriate, initiate EN via PEG of   Isosource 1.5 @ 15mL/hr to start and increase 10mL every 4-6 hours, or as tolerated, until goal rate of 40mL/hr is met to provide 960mL total volume; 1440kcals; 65gm protein; 747 mL free H2O. Additionally, this provides 2300mg (57.6mEq) of Potassium.   2. Recommend continuous water flushes @ 30mL/hr or PER MD/NP.   3. Weekly Weights.   4. RD to follow up.     Goals: EN initiation within the next 24 hours.     Nutrition Goal Status: new  Communication of RD Recs: (Plan of Care; Sticky note)    Reason for Assessment    Reason For Assessment: consult  Relevant Medical History: Cerebal palsy, seizures, decubitus ulcer, pressure injury     General Information Comments:   12/14/20: RD consulted to assess patient's nutrition status due to malnutrition. Pt familiar to facility with recent tracheostomy 10/27/20 and colostomy revision 12/8/20. He presented to ED due to drainage from tracheostomy incision. Due to current hospital precautions for VRE infection, RD did not directly interview patient or complete NFPE. Although he does meet criteria for malnutrition from other parameters. Documented in greater detail below. LBM on 12/13 per documentation.   12/17/20: RD re consulted for EN initiation; TF being held off at this time due to pending CT. Per progress notes, ruled out possible bleeding from PEG and Ostomy site. Third spacing with gentle diuresis noted also. RD EN recommendations continue with the same rate and formula. LBM 12/16/20 per I/O.     Nutrition Discharge Planning: Discharge goal: pending medical course     Nutrition Risk Screen    Nutrition Risk Screen: large or nonhealing wound, burn or pressure injury, dysphagia or difficulty swallowing    Nutrition/Diet History    Spiritual, Cultural Beliefs, Muslim Practices, Values that Affect Care:  "no  Food Allergies: NKFA    Anthropometrics    Temp: 98.5 °F (36.9 °C)  Height Method: Stated  Height: 4' 6" (137.2 cm)  Height (inches): 54 in  Weight Method: Bed Scale  Weight: 42 kg (92 lb 9.5 oz)  Weight (lb): 92.59 lb  Ideal Body Weight (IBW), Male: 70 lb  BMI (Calculated): 22.3  BMI Grade: 18.5-24.9 - normal       Lab/Procedures/Meds    Pertinent Labs Reviewed: reviewed  Pertinent Labs Comments: K 2.3CL; Ca 7.0L; Total Pro 4.7L; Alb 1.2L Elevated Lipase   Pertinent Medications Reviewed: reviewed  Pertinent Medications Comments: Abx, Potassium bicarbonate, Protonix     Physical Findings/Assessment     Multiple wounds noted.    Estimated/Assessed Needs    Weight Used For Calorie Calculations: 42 kg (92 lb 9.5 oz)(Ideal Body Weight @ 92#/42kg)  Energy Calorie Requirements (kcal): 1400  Energy Need Method: Hanson-St Jeor(PAL * 1.2)  Protein Requirements: 50-63 gm(1.2-1.3gm/kg)  Weight Used For Protein Calculations: 42 kg (92 lb 9.5 oz)  Fluid Requirements (mL): 1400  Estimated Fluid Requirement Method: RDA Method(or PER MD)  RDA Method (mL): 1400  CHO Requirement: 175      Nutrition Prescription Ordered    Current Diet Order: NPO.  Nutrition Order Comments: PEG tube present.     Evaluation of Received Nutrient/Fluid Intake          Intake/Output Summary (Last 24 hours) at 12/17/2020 1415  Last data filed at 12/17/2020 0600  Gross per 24 hour   Intake 750 ml   Output 1250 ml   Net -500 ml       % Intake of Estimated Energy Needs: 0 - 25 %  % Meal Intake: NPO; EN on hold    Nutrition Risk    Level of Risk/Frequency of Follow-up: (2x week)     Assessment and Plan    Severe malnutrition  Nutrition Problem:  (Severe) Protein-Calorie Malnutrition  Malnutrition in the context of Chronic Illness/Injury and Acute Illness/Injury    Related to (etiology):  Alteration in GI tract/structure    Signs and Symptoms (as evidenced by):  Energy Intake: <75% of estimated energy requirement for 1 week (PEG tube feedings: due to " current hospital problem)  Body Fat Depletion: NICOLA directly due to contact precautions; Pt meets other criteria (PEG feedings; multiple wounds; recent weight loss)  Fluid Accumulation: RD NICOLA    Interventions(treatment strategy):  Nutrition prescription-EN initiation   Collaboration with other providers    Nutrition Diagnosis Status:  New           Monitor and Evaluation    Food and Nutrient Intake: energy intake  Food and Nutrient Adminstration: enteral and parenteral nutrition administration  Anthropometric Measurements: weight  Biochemical Data, Medical Tests and Procedures: electrolyte and renal panel, gastrointestinal profile, glucose/endocrine profile, inflammatory profile, lipid profile  Nutrition-Focused Physical Findings: overall appearance     Malnutrition Assessment  Malnutrition Type: (Unable to complete NFPE due to current contact precautions)                                    Nutrition Follow-Up    RD Follow-up?: Yes     Shakila Hudson RD, PRIMITIVO

## 2020-12-17 NOTE — PROGRESS NOTES
Continues to have low potassium. Added 20 ml equivalents to IV fluids. Appreciate pulmonology recommendations to add diuretics however this may make potassium more difficult to control. Will restart feedings

## 2020-12-17 NOTE — CONSULTS
[facility] -   Pulmonology  Consult Note    Patient Name: Glen Moscoso  MRN: [id number]  Admission Date: 12/13/2020  Hospital Length of Stay: 4 days  Code Status: Full Code  Attending Physician: Layo Beck DO  Primary Care Provider: Yadi Lawson MD   Principal Problem: Sepsis      Subjective: worsening Chest X Ray      HPI:  26 y/o white male with a past medical history of cerebral palsy complicated by seizures, tracheostomy dependent.  I was asked see this patient due to an abnormal CT scan on of the chest which demonstrated white out of the left long.  Patient has a history of chronic tracheostomy and has had a longstanding history of aspiration.  On discussion with his mother this is a recurring problem.  Chest x-ray today demonstrated whiteout of the left lung that was confirmed on CT scan of the chest.    Patient is unable to give a history.  Other medical problems include osteomyelitis of the pelvis  seizures, colitis, acid reflux, severe PCM, Peg/G  tube, pelvic osteo, cerebral palsy, anemia, proctocolitis Recently hospitalized here from 12/06 to 12/12/2020 for evaluation of a malfunctioning colostomy.  Patient was admitted to the hospital on 12/14/2020 for suspected GI bleed. Noted to have tachycardia, coffee ground emesis,  and dark/black stool noted coming from his colostomy.  Chest X Ray on 12/13 looked relatively clear but Chest X Ray today demonstrated white out of the left lung      Past Medical History:   Diagnosis Date    Acid reflux     Anemia of chronic disease 8/30/2020    Cerebral palsy     Colitis     Decubitus ulcer of ischial area, left, stage IV 9/10/2020    History of hip surgery     Osteomyelitis of pelvis 9/24/2020    Pneumoperitoneum 9/21/2020    Pressure injury of right ischium, stage 4     S/P percutaneous endoscopic gastrostomy (PEG) tube placement     Seizure disorder 8/26/2020    Seizures     Severe protein-calorie malnutrition     Sinus  tachycardia 8/4/2020       Past Surgical History:   Procedure Laterality Date    CHOLECYSTECTOMY      ESOPHAGOGASTRODUODENOSCOPY N/A 12/15/2020    Procedure: EGD (ESOPHAGOGASTRODUODENOSCOPY);  Surgeon: Mickie Bautista MD;  Location: [facility] ENDO;  Service: Endoscopy;  Laterality: N/A;    EYE SURGERY      FLEXIBLE SIGMOIDOSCOPY N/A 9/9/2020    Procedure: SIGMOIDOSCOPY, FLEXIBLE;  Surgeon: America Goode MD;  Location: [facility] ENDO ([facility] FLR);  Service: Endoscopy;  Laterality: N/A;    HIP SURGERY      REVISION COLOSTOMY N/A 12/8/2020    Procedure: REVISION, COLOSTOMY;  Surgeon: Michael Ortez MD;  Location: [facility] OR;  Service: General;  Laterality: N/A;  with partial colectomy    TRACHEOSTOMY N/A 10/27/2020    Procedure: CREATION, TRACHEOSTOMY;  Surgeon: Kar Palma MD;  Location: [facility] OR;  Service: ENT;  Laterality: N/A;       Review of patient's allergies indicates:   Allergen Reactions    Morphine sulfate Hives     Patient has tolerated morphine several times in the past.        Family History     Problem Relation (Age of Onset)    Cancer Maternal Grandfather        Tobacco Use    Smoking status: Never Smoker    Smokeless tobacco: Never Used   Substance and Sexual Activity    Alcohol use: Never     Frequency: Never    Drug use: Not Currently    Sexual activity: Never         Review of Systems   Unable to perform ROS: Dementia     Objective:     Vital Signs (Most Recent):  Temp: 99.1 °F (37.3 °C) (12/17/20 1551)  Pulse: 104 (12/17/20 1551)  Resp: 20 (12/17/20 1551)  BP: 119/83 (12/17/20 1551)  SpO2: 96 % (12/17/20 1551) Vital Signs (24h Range):  Temp:  [98.5 °F (36.9 °C)-99.7 °F (37.6 °C)] 99.1 °F (37.3 °C)  Pulse:  [104-128] 104  Resp:  [18-24] 20  SpO2:  [86 %-97 %] 96 %  BP: (119-132)/(81-97) 119/83     Weight: 42 kg (92 lb 9.5 oz)  Body mass index is 22.33 kg/m².      Intake/Output Summary (Last 24 hours) at 12/17/2020 1631  Last data filed at 12/17/2020 0600  Gross per 24 hour   Intake 750 ml    Output 1250 ml   Net -500 ml       Physical Exam  Vitals signs and nursing note reviewed.   Constitutional:       Appearance: He is ill-appearing and diaphoretic.   HENT:      Head: Normocephalic.      Nose: Nose normal.      Mouth/Throat:      Mouth: Mucous membranes are moist.      Comments: tracheosctomy  Eyes:      Pupils: Pupils are equal, round, and reactive to light.   Pulmonary:      Breath sounds: Examination of the left-upper field reveals decreased breath sounds and rales. Examination of the left-middle field reveals decreased breath sounds and rales. Examination of the left-lower field reveals decreased breath sounds and rales. Decreased breath sounds and rales present.   Abdominal:      General: There is distension.         Vents:  Oxygen Concentration (%): 40 (12/17/20 0900)    Lines/Drains/Airways     Central Venous Catheter Line            Tunneled Central Line Insertion/Assessment - Double Lumen  09/28/20 1400 right subclavian 80 days          Drain                 Gastrostomy/Enterostomy 08/04/20 1653 Percutaneous endoscopic gastrostomy (PEG) feeding 135 days         Gastrostomy/Enterostomy 10/22/20 1300 LUQ 56 days         Colostomy 10/29/20 1349 Descending/sigmoid LLQ 49 days         Urethral Catheter 11/13/20 1230 Latex 16 Fr. 34 days         Urethral Catheter 12/06/20 1207 Latex 16 Fr. 11 days          Airway                 Surgical Airway Shiley Cuffed -- days         Surgical Airway 10/27/20 0855 Shiley Cuffed 51 days         Airway - Non-Surgical 12/08/20 0924 Endotracheal Tube 9 days                Significant Labs:    CBC/Anemia Profile:  Recent Labs   Lab 12/16/20  1100 12/17/20  0506   WBC 13.85* 15.69*   HGB 8.6* 8.0*   HCT 28.2* 25.8*   * 454*   MCV 93 92   RDW 17.2* 16.9*        Chemistries:  Recent Labs   Lab 12/17/20  0506   *   K 2.3*   *   CO2 21*   BUN 12   CREATININE 0.7   CALCIUM 7.0*   ALBUMIN 1.2*   PROT 4.7*   BILITOT 0.2   ALKPHOS 839*   ALT 14   AST  18       BMP:   Recent Labs   Lab 12/17/20  0506   GLU 86   *   K 2.3*   *   CO2 21*   BUN 12   CREATININE 0.7   CALCIUM 7.0*     CMP:   Recent Labs   Lab 12/17/20  0506   *   K 2.3*   *   CO2 21*   GLU 86   BUN 12   CREATININE 0.7   CALCIUM 7.0*   PROT 4.7*   ALBUMIN 1.2*   BILITOT 0.2   ALKPHOS 839*   AST 18   ALT 14   ANIONGAP 10   EGFRNONAA >60     All pertinent labs within the past 24 hours have been reviewed.    Significant Imaging:   I have reviewed all pertinent imaging results/findings within the past 24 hours.  I have reviewed and interpreted all pertinent imaging results/findings within the past 24 hours.       CT Chest Without Contrast  Narrative: EXAMINATION:  CT CHEST WITHOUT CONTRAST, multiplanar reconstructions    CLINICAL HISTORY:  Pleural effusion;    TECHNIQUE:  Axial images through the chest were obtained without the use of IV contrast. Sagittal and coronal <reconstructions are provided for review>.    COMPARISON:  Comparisons are made to a chest x-ray performed earlier the same day, as well as a chest CT scan from December 14, 2020    FINDINGS:  Interval increase in size of right pleural effusion.  Similar size left pleural effusion noted.  Interval development of dense infiltrate involving the entire right upper lobe and lingula with similar degree of partial collapse of the left lower lobe with some mild residual aeration in the inferior lingula and central left lower lobe.  Similar degree of dependent atelectasis in the right lung base and ground-glass opacity in the right lower lobe.  Negative for pneumothorax.    There are no definite hilar or mediastinal masses or lymphadenopathy.    Stable tracheostomy cannula.  Stable right jugular central line.  The airways are otherwise patent.  The thyroid gland is normal.  The esophagus is normal.    Cholecystectomy clips.  Marked wall thickening of the colon again seen.  The upper abdominal organs are otherwise  unchanged.    Osseous structures are unchanged.  Similar degree of moderate to marked anasarca.  Impression: 1.  Detrimental change.  There is now near complete atelectasis/collapse of the left upper lobe.  There is a similar degree of partial atelectasis/collapse of the left lower lobe.    2.  Worsening right effusion.  Left effusion is stable in size.    3.  Numerous stable findings as noted above, to include marked wall thickening of the colon concerning for colitis, tracheostomy cannula in place, right jugular central line, moderate anasarca and cholecystectomy clips.    All CT scans at this facility are performed  using dose modulation techniques as appropriate to performed exam including the following:  automated exposure control; adjustment of mA and/or kV according to the patients size (this includes techniques or standardized protocols for targeted exams where dose is matched to indication/reason for exam: i.e. extremities or head);  iterative reconstruction technique.    Electronically signed by: Mike Orozco MD  Date:    12/17/2020  Time:    15:12  X-Ray Chest AP Portable  Narrative: EXAMINATION:  XR CHEST AP PORTABLE    CLINICAL HISTORY:  increased oxygen need;    COMPARISON:  December 13, 2020    FINDINGS:  EKG leads overlie the chest.  There has been interval development of significant opacity involving the left hemithorax, much of which is related to alveolar infiltrate although most of the changes in the upper and lateral portions of the right pleural space appear to represent an effusion on this supine radiograph.  The right lung again demonstrates vascular crowding or atelectasis related to low lung volumes.  The cardiac silhouette size is normal. The trachea is midline and the mediastinal width is normal. Negative for right effusion or pneumothorax.  Pulmonary vasculature is normal. Negative for osseous abnormalities. Stable tracheostomy cannula and right arm PICC line.  Impression: 1.   Detrimental change.  There is significant opacity throughout the left hemithorax, a combination of effusion and infiltrate throughout the left hemithorax.  Pneumonia with parapneumonic effusion must be considered.    2.  Stable findings as noted above.    Electronically signed by: Mike Orozco MD  Date:    12/17/2020  Time:    12:59          ABG  No results for input(s): PH, PO2, PCO2, HCO3, BE in the last 168 hours.  Assessment/Plan:     Bilateral pleural effusion  12/17 Start diuretics and cut back on IV fluis    Status post tracheostomy  12/17 continue tach care    Aspiration pneumonia  Start jet nebs and pulmonary toilet          Thank you for your consult. I will follow-up with patient. Please contact us if you have any additional questions.     Maikel Garcia MD  Pulmonology  [facility] -

## 2020-12-17 NOTE — PHYSICIAN QUERY
PT Name: Glen Moscoso  MR #: [id number]   Documentation Clarification   Zoe Patino RN, CCDS; [email or web address]    This form is a permanent document in the medical record.     Query Date: December 17, 2020    By submitting this query, we are merely seeking further clarification of documentation.   Please utilize your independent clinical judgment when addressing the question(s) below.    The Medical Record reflects the following:  Supporting Clinical Findings Location in Medical Record   [facility] He was discharged on 11/14.   Per chart review patient grew VRE on last blood cultures.     Metronidazole IVPB 12/6-10  Linezolid IV              12/6-10  Ceftriaxone IV          12/6 - 8  IVF: LR 1,080 ml over 2 hours in ED    Blood Bank:  1 u pRBC transfused on 12/6   H&P        MAR        Blood Bank   WBC:   15.59 - 12.65 - 17.28 - 15.69                     Plts:  661 - 362  ANC:     8.0 - 13.8                                                   Cr:    0.6 - 0.7  Bili,T:     0.1 - 0.7                                                    CRP: 58.4 on 12/14  Lactate: 1.5, 1.1                                                      Procal: 1.57    BC 12/6: NGTD     urine cx 12/6: Candida Tropicalis  10,000 - 49,999 cfu/ml                                   Resp Cx 12/10: Acintobacter B = many;   E. Coli = Moderate  Resp Cx 12/13: Serratia Marcescens    = moderate    12/14: R Buttock Wound             Stenotrophomonas Meltophilia   = moderate             Acinetoacter Baumanni   = Many    Tm: 100.2 on 12/6  HR: 119 - 85                                          RR: 24 - 14  BP: 105/59 - 134/87                             Sats: 99 - 100% on 5-6 lpm TC O2   Lab 12/6-10                                  VS Flow Sheet 12/6-10   Sepsis  12/6:   Sepsis likely secondary to UTI             However will cover for intra-abdominal source            Empirically as well            Zyvox on board due to  "VRE bacteremia from previous culture            Will plan to de-escalate based on blood cultures    metabolic acidosis.   Patient was bolused 30 cc/kg of LR and started on zyvox  previously receiving IV antibiotics at?[facility]  Bicar drip for metabolic acidosis    Sepsis likely secondary to UTI  12/7 BC X 2, NGTD;   Urine culture in progress;  Continue IV antibiotics?    Sepsis resolved H&P                H&P and DCS        Hosp PN 12/7-9      DCS 12/10   Bilateral sacral wounds, stage 4 to wound vac?   Severe malnutrition;  Hypokalemia;    Anemia, unspec;  Seizure  Cerebral Palsy, nonverbal @ baseline  Status post colostomy - Colostomy malfunction;  Rizvi cath in place for decompression  UTI (urinary tract infection)    Pt rizvi catheter replaced    hosp pn 12/8    H&P          ED RN                                                                            Provider, please provide diagnosis or diagnoses associated with above clinical findings.                    Please further specify organisms associated with "sepsis".            -  Possible 2 part response -     [   ] Sepsis due to UTI: Candida Tropicalis    [   ] Sepsis due to UTI - organism not further specified   [   ] Sepsis due to UTI - specify organism: ____________   [X   ] Sepsis due to other - specify source and organism:         Source: ___Resp Cx: Sputum _________________  Resp Cx 12/10: Acintobacter B = many;   E. Coli = Moderate  Resp Cx 12/13: Serratia Marcescens    = moderate      Organism: __________________     [   ] Sepsis ruled out   [   ] Other (please specify): ____________   [  ] Clinically undetermined                                                                                                                   "

## 2020-12-17 NOTE — PROGRESS NOTES
[facility]  Infectious Disease  Progress Note    Patient Name: Glen Moscoso  MRN: [id number]  Admission Date: 12/13/2020  Length of Stay: 4 days  Attending Physician: Layo Beck DO  Primary Care Provider: Yadi Lawson MD    Isolation Status: No active isolations  Assessment/Plan:      Sacral decubitus ulcer  He was seen -10/28/2020- the plan was made to complete 6 weeks of therapy with Cefepime, Flagyl and Vancomycin   Will send ESR and CRP.    12/15- will continue wound care , continue Unasyn/Levaquin .  He was initially treated for osteomyelitis in the past .    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  12/15- will continue Unasyn        Gastrointestinal hemorrhage  GI follow up     Seizure disorder  Will resume home meds        Anticipated Disposition:     Thank you for your consult. I will follow-up with patient. Please contact us if you have any additional questions.    Kar Morales MD  Infectious Disease  [facility]    Subjective:     Principal Problem:Sepsis    HPI:   25 year old  male with a PMH of seizures, colitis, acid reflux, severe PCM, Peg tube, pelvic osteo, cerebral palsy, anemia, prtureoctocolitis who was recently discharged from the Hospital -12./11/2020.  All cultures reviewed -tu  12/10- Resp cultures-  ACINETOBACTER BAUMANNII    Many   Susceptibility pending   Abnormal        ESCHERICHIA COLI   Moderate   Susceptibility pending      Blood culture-  11/13- VRE.  Component      Latest Ref Rng & Units 12/14/2020 12/14/2020           7:03 AM  5:22 AM   WBC      3.90 - 12.70 K/uL 15.87 (H)    CT ABDOMEN -   Moderate sized bilateral pleural effusions with adjacent  atelectasis/consolidation in the bilateral lower lobes.  Moderate anasarca, worse from the comparison study.  Mild ascites.  Findings are concerning for 3rd spacing, possibly related to renal, hepatic or cardiac dysfunctio    Large bilateral decubitus ulcers with underlying bony involvement in the ischial spine/ischial tuberosity region.  Clinical correlation is advised.     7.  Marked deformity to both hip joints with synovial hypertrophy.     Interval History: 25 year old man with cerebral palsy ,anemia.  All cultures reviewed .  -12/13- resp culture- serratia   12/10- Resp- Acinetobacter   E coli - -unasyn resistant     WBC   Component      Latest Ref Rng & Units 12/15/2020 12/14/2020 12/14/2020            7:09 PM  7:03 AM   WBC      3.90 - 12.70 K/uL 13.90 (H) 14.69 (H) 15.87 (H)   12/16- He is afebrile   Gluteal cultures- Acinetobacter /stenotrophomonas   12/13- resp culture- serratia   12/10- Resp- Acinetobacter   E coli - -unasyn resistant     Review of Systems   Unable to perform ROS: Patient nonverbal     Objective:     Vital Signs (Most Recent):  Temp: 99.1 °F (37.3 °C) (12/17/20 0409)  Pulse: 110 (12/17/20 0417)  Resp: (!) 24 (12/17/20 0417)  BP: 124/83 (12/17/20 0409)  SpO2: (!) 90 % (12/17/20 0417) Vital Signs (24h Range):  Temp:  [98.9 °F (37.2 °C)-100.8 °F (38.2 °C)] 99.1 °F (37.3 °C)  Pulse:  [109-146] 110  Resp:  [18-24] 24  SpO2:  [86 %-96 %] 90 %  BP: (120-140)/(81-97) 124/83     Weight: 42 kg (92 lb 9.5 oz)  Body mass index is 22.33 kg/m².    Estimated Creatinine Clearance: 113.8 mL/min (based on SCr of 0.6 mg/dL).    Physical Exam  Vitals signs and nursing note reviewed.   Constitutional:       General: He is not in acute distress.     Appearance: He is well-developed. He is ill-appearing. He is not diaphoretic.      Comments: At mental baseline   HENT:      Head: Normocephalic and atraumatic.      Nose: Nose normal.   Eyes:      General:         Right eye: No discharge.         Left eye:  No discharge.      Conjunctiva/sclera: Conjunctivae normal.      Pupils: Pupils are equal, round, and reactive to light.      Comments: No purposeful eye movement     Neck:      Thyroid: No thyromegaly.      Trachea: Tracheostomy present.   Cardiovascular:      Rate and Rhythm: Regular rhythm. Tachycardia present.      Heart sounds: Normal heart sounds. No murmur.      Comments: Cool extremities  Pulmonary:      Effort: Pulmonary effort is normal. No respiratory distress.      Breath sounds: Normal breath sounds. No wheezing.   Abdominal:      General: There is no distension.      Palpations: Abdomen is soft.      Comments: PEG and colostomy in place. No evidence of bleeding   Genitourinary:     Comments: catheter  Skin:     General: Skin is warm.      Findings: No rash.   Neurological:      Mental Status: Mental status is at baseline.         Significant Labs:   Blood Culture:   Recent Labs   Lab 11/13/20  2332 12/06/20  1345 12/06/20  1405 12/13/20  1530 12/13/20  1536   LABBLOO No growth after 5 days.  Gram stain aer bottle: Gram positive cocci in chains resembling Strep   Results called to and read back by: Zee Taylor RN 11/14/2020  22:56  ENTEROCOCCUS FAECIUM VRE* No growth after 5 days. No growth after 5 days. No Growth to date  No Growth to date  No Growth to date No Growth to date  No Growth to date  No Growth to date     BMP:   No results for input(s): GLU, NA, K, CL, CO2, BUN, CREATININE, CALCIUM, MG in the last 48 hours.  CBC:   Recent Labs   Lab 12/15/20  0944 12/16/20  1100   WBC 13.90* 13.85*   HGB 8.9* 8.6*   HCT 29.5* 28.2*   * 486*     CMP:   No results for input(s): NA, K, CL, CO2, GLU, BUN, CREATININE, CALCIUM, PROT, ALBUMIN, BILITOT, ALKPHOS, AST, ALT, ANIONGAP, EGFRNONAA in the last 48 hours.    Invalid input(s): ESTGFAFRICA  Respiratory Culture:   Recent Labs   Lab 08/31/20  2157 09/06/20  1103 10/03/20  1749 12/10/20  0514 12/13/20  1855   GSRESP <10 epithelial cells per low  power field.  Rare WBC's  Rare Gram negative rods >10 epithelial cells per low power field  Rare WBC's  Rare Gram negative rods <10 epithelial cells per low power field.  Rare WBC's  Rare budding yeast <10 epithelial cells per low power field.  Moderate WBC's  Moderate Gram negative rods  Rare Gram positive cocci  Rare Gram negative diplococci <10 epithelial cells per low power field.  Few yeast  Rare Gram negative rods  Few WBC's   RESPIRATORYC No S aureus or Pseudomonas isolated.  SERRATIA MARCESCENS  Moderate  * No S aureus or Pseudomonas isolated.  SERRATIA MARCESCENS  Moderate  * No S aureus or Pseudomonas isolated.  SERRATIA MARCESCENS  Few  * No S aureus or Pseudomonas isolated.  ACINETOBACTER BAUMANNII   Many  *  ESCHERICHIA COLI  Moderate  * No S aureus or Pseudomonas isolated.  SERRATIA MARCESCENS  Moderate  *     Wound Culture:   Recent Labs   Lab 12/14/20  1208   LABAERO STENOTROPHOMONAS (X.) MALTOPHILIA  Moderate  Susceptibility pending  *  ACINETOBACTER BAUMANNII COMPLEX  Many  Susceptibility pending  *     All pertinent labs within the past 24 hours have been reviewed.    Significant Imaging: I have reviewed all pertinent imaging results/findings within the past 24 hours.

## 2020-12-17 NOTE — SUBJECTIVE & OBJECTIVE
Past Medical History:   Diagnosis Date    Acid reflux     Anemia of chronic disease 8/30/2020    Cerebral palsy     Colitis     Decubitus ulcer of ischial area, left, stage IV 9/10/2020    History of hip surgery     Osteomyelitis of pelvis 9/24/2020    Pneumoperitoneum 9/21/2020    Pressure injury of right ischium, stage 4     S/P percutaneous endoscopic gastrostomy (PEG) tube placement     Seizure disorder 8/26/2020    Seizures     Severe protein-calorie malnutrition     Sinus tachycardia 8/4/2020       Past Surgical History:   Procedure Laterality Date    CHOLECYSTECTOMY      ESOPHAGOGASTRODUODENOSCOPY N/A 12/15/2020    Procedure: EGD (ESOPHAGOGASTRODUODENOSCOPY);  Surgeon: Mickie Bautista MD;  Location: [facility] ENDO;  Service: Endoscopy;  Laterality: N/A;    EYE SURGERY      FLEXIBLE SIGMOIDOSCOPY N/A 9/9/2020    Procedure: SIGMOIDOSCOPY, FLEXIBLE;  Surgeon: America Goode MD;  [facility] ([facility]  Service: Endoscopy;  Laterality: N/A;    HIP SURGERY      REVISION COLOSTOMY N/A 12/8/2020    Procedure: REVISION, COLOSTOMY;  Surgeon: Michael Ortez MD;  Location: [facility] OR;  Service: General;  Laterality: N/A;  with partial colectomy    TRACHEOSTOMY N/A 10/27/2020    Procedure: CREATION, TRACHEOSTOMY;  Surgeon: Kar Palma MD;  Location: [facility] OR;  Service: ENT;  Laterality: N/A;       Review of patient's allergies indicates:   Allergen Reactions    Morphine sulfate Hives     Patient has tolerated morphine several times in the past.        Family History     Problem Relation (Age of Onset)    Cancer Maternal Grandfather        Tobacco Use    Smoking status: Never Smoker    Smokeless tobacco: Never Used   Substance and Sexual Activity    Alcohol use: Never     Frequency: Never    Drug use: Not Currently    Sexual activity: Never         Review of Systems   Unable to perform ROS: Dementia     Objective:     Vital Signs (Most Recent):  Temp: 99.1 °F (37.3 °C) (12/17/20  1551)  Pulse: 104 (12/17/20 1551)  Resp: 20 (12/17/20 1551)  BP: 119/83 (12/17/20 1551)  SpO2: 96 % (12/17/20 1551) Vital Signs (24h Range):  Temp:  [98.5 °F (36.9 °C)-99.7 °F (37.6 °C)] 99.1 °F (37.3 °C)  Pulse:  [104-128] 104  Resp:  [18-24] 20  SpO2:  [86 %-97 %] 96 %  BP: (119-132)/(81-97) 119/83     Weight: 42 kg (92 lb 9.5 oz)  Body mass index is 22.33 kg/m².      Intake/Output Summary (Last 24 hours) at 12/17/2020 1631  Last data filed at 12/17/2020 0600  Gross per 24 hour   Intake 750 ml   Output 1250 ml   Net -500 ml       Physical Exam  Vitals signs and nursing note reviewed.   Constitutional:       Appearance: He is ill-appearing and diaphoretic.   HENT:      Head: Normocephalic.      Nose: Nose normal.      Mouth/Throat:      Mouth: Mucous membranes are moist.      Comments: tracheosctomy  Eyes:      Pupils: Pupils are equal, round, and reactive to light.   Pulmonary:      Breath sounds: Examination of the left-upper field reveals decreased breath sounds and rales. Examination of the left-middle field reveals decreased breath sounds and rales. Examination of the left-lower field reveals decreased breath sounds and rales. Decreased breath sounds and rales present.   Abdominal:      General: There is distension.         Vents:  Oxygen Concentration (%): 40 (12/17/20 0900)    Lines/Drains/Airways     Central Venous Catheter Line            Tunneled Central Line Insertion/Assessment - Double Lumen  09/28/20 1400 right subclavian 80 days          Drain                 Gastrostomy/Enterostomy 08/04/20 1653 Percutaneous endoscopic gastrostomy (PEG) feeding 135 days         Gastrostomy/Enterostomy 10/22/20 1300 LUQ 56 days         Colostomy 10/29/20 1349 Descending/sigmoid LLQ 49 days         Urethral Catheter 11/13/20 1230 Latex 16 Fr. 34 days         Urethral Catheter 12/06/20 1207 Latex 16 Fr. 11 days          Airway                 Surgical Airway Shiley Cuffed -- days         Surgical Airway 10/27/20 0855  Kadeem Cuffed 51 days         Airway - Non-Surgical 12/08/20 0924 Endotracheal Tube 9 days                Significant Labs:    CBC/Anemia Profile:  Recent Labs   Lab 12/16/20  1100 12/17/20  0506   WBC 13.85* 15.69*   HGB 8.6* 8.0*   HCT 28.2* 25.8*   * 454*   MCV 93 92   RDW 17.2* 16.9*        Chemistries:  Recent Labs   Lab 12/17/20  0506   *   K 2.3*   *   CO2 21*   BUN 12   CREATININE 0.7   CALCIUM 7.0*   ALBUMIN 1.2*   PROT 4.7*   BILITOT 0.2   ALKPHOS 839*   ALT 14   AST 18       BMP:   Recent Labs   Lab 12/17/20  0506   GLU 86   *   K 2.3*   *   CO2 21*   BUN 12   CREATININE 0.7   CALCIUM 7.0*     CMP:   Recent Labs   Lab 12/17/20  0506   *   K 2.3*   *   CO2 21*   GLU 86   BUN 12   CREATININE 0.7   CALCIUM 7.0*   PROT 4.7*   ALBUMIN 1.2*   BILITOT 0.2   ALKPHOS 839*   AST 18   ALT 14   ANIONGAP 10   EGFRNONAA >60     All pertinent labs within the past 24 hours have been reviewed.    Significant Imaging:   I have reviewed all pertinent imaging results/findings within the past 24 hours.  I have reviewed and interpreted all pertinent imaging results/findings within the past 24 hours.       CT Chest Without Contrast  Narrative: EXAMINATION:  CT CHEST WITHOUT CONTRAST, multiplanar reconstructions    CLINICAL HISTORY:  Pleural effusion;    TECHNIQUE:  Axial images through the chest were obtained without the use of IV contrast. Sagittal and coronal <reconstructions are provided for review>.    COMPARISON:  Comparisons are made to a chest x-ray performed earlier the same day, as well as a chest CT scan from December 14, 2020    FINDINGS:  Interval increase in size of right pleural effusion.  Similar size left pleural effusion noted.  Interval development of dense infiltrate involving the entire right upper lobe and lingula with similar degree of partial collapse of the left lower lobe with some mild residual aeration in the inferior lingula and central left lower lobe.   Similar degree of dependent atelectasis in the right lung base and ground-glass opacity in the right lower lobe.  Negative for pneumothorax.    There are no definite hilar or mediastinal masses or lymphadenopathy.    Stable tracheostomy cannula.  Stable right jugular central line.  The airways are otherwise patent.  The thyroid gland is normal.  The esophagus is normal.    Cholecystectomy clips.  Marked wall thickening of the colon again seen.  The upper abdominal organs are otherwise unchanged.    Osseous structures are unchanged.  Similar degree of moderate to marked anasarca.  Impression: 1.  Detrimental change.  There is now near complete atelectasis/collapse of the left upper lobe.  There is a similar degree of partial atelectasis/collapse of the left lower lobe.    2.  Worsening right effusion.  Left effusion is stable in size.    3.  Numerous stable findings as noted above, to include marked wall thickening of the colon concerning for colitis, tracheostomy cannula in place, right jugular central line, moderate anasarca and cholecystectomy clips.    All CT scans at this facility are performed  using dose modulation techniques as appropriate to performed exam including the following:  automated exposure control; adjustment of mA and/or kV according to the patients size (this includes techniques or standardized protocols for targeted exams where dose is matched to indication/reason for exam: i.e. extremities or head);  iterative reconstruction technique.    Electronically signed by: Mike Orozco MD  Date:    12/17/2020  Time:    15:12  X-Ray Chest AP Portable  Narrative: EXAMINATION:  XR CHEST AP PORTABLE    CLINICAL HISTORY:  increased oxygen need;    COMPARISON:  December 13, 2020    FINDINGS:  EKG leads overlie the chest.  There has been interval development of significant opacity involving the left hemithorax, much of which is related to alveolar infiltrate although most of the changes in the upper and  lateral portions of the right pleural space appear to represent an effusion on this supine radiograph.  The right lung again demonstrates vascular crowding or atelectasis related to low lung volumes.  The cardiac silhouette size is normal. The trachea is midline and the mediastinal width is normal. Negative for right effusion or pneumothorax.  Pulmonary vasculature is normal. Negative for osseous abnormalities. Stable tracheostomy cannula and right arm PICC line.  Impression: 1.  Detrimental change.  There is significant opacity throughout the left hemithorax, a combination of effusion and infiltrate throughout the left hemithorax.  Pneumonia with parapneumonic effusion must be considered.    2.  Stable findings as noted above.    Electronically signed by: Mike Orozco MD  Date:    12/17/2020  Time:    12:59

## 2020-12-17 NOTE — CARE UPDATE
X-ray which was done due to increasing oxygenation need indicated deterioration of the left lung fields showing complete opacification of left lung.  Consulted with pulmonology.  Will get CT to clarify whether this is pneumonia or effusion to determine whether thoracentesis would be beneficial.  Holding off on beginning enteral feeding at this time.

## 2020-12-17 NOTE — ASSESSMENT & PLAN NOTE
Appreciate dietary recs. Realized today that dietary not on case. Consult placed and getting him back on enteral nutrition. This will help potassium balance

## 2020-12-17 NOTE — ASSESSMENT & PLAN NOTE
This patient does have evidence of infective focus  My overall impression is sepsis. Vital signs were reviewed and noted in progress note.  Antibiotics given-   Antibiotics (From admission, onward)    Start     Stop Route Frequency Ordered    12/14/20 1630  ampicillin-sulbactam 3 g in sodium chloride 0.9 % 100 mL IVPB (ready to mix system)      -- IV Every 6 hours (non-standard times) 12/14/20 1521    12/14/20 1630  levoFLOXacin 750 mg/150 mL IVPB 750 mg      -- IV Every 24 hours (non-standard times) 12/14/20 1521    12/13/20 2100  mupirocin 2 % ointment      12/18 2059 Nasl 2 times daily 12/13/20 1649        Cultures were taken-   Microbiology Results (last 7 days)     Procedure Component Value Units Date/Time    Blood culture x two cultures. Draw prior to antibiotics. [[id number]] Collected: 12/13/20 1530    Order Status: Completed Specimen: Blood from Peripheral, Antecubital, Left Updated: 12/17/20 0613     Blood Culture, Routine No Growth to date      No Growth to date      No Growth to date      No Growth to date    Narrative:      Aerobic and anaerobic    Blood culture x two cultures. Draw prior to antibiotics. [[id number]] Collected: 12/13/20 1536    Order Status: Completed Specimen: Blood from Peripheral, Antecubital, Left Updated: 12/17/20 0613     Blood Culture, Routine No Growth to date      No Growth to date      No Growth to date      No Growth to date    Narrative:      Aerobic and anaerobic    Aerobic culture [[id number]]  (Abnormal) Collected: 12/14/20 1208    Order Status: Completed Specimen: Wound from Buttocks, Right Updated: 12/16/20 1339     Aerobic Bacterial Culture STENOTROPHOMONAS (X.) MALTOPHILIA  Moderate  Susceptibility pending        ACINETOBACTER BAUMANNII COMPLEX  Many  Susceptibility pending      Narrative:      Right ischium stage 4 pressure injury    Culture, Respiratory with Gram Stain [[id number]]  (Abnormal)  (Susceptibility) Collected: 12/13/20 1855    Order Status: Completed  Specimen: Respiratory from Sputum Updated: 12/16/20 1127     Respiratory Culture No S aureus or Pseudomonas isolated.      SERRATIA MARCESCENS  Moderate       Gram Stain (Respiratory) <10 epithelial cells per low power field.     Gram Stain (Respiratory) Few yeast     Gram Stain (Respiratory) Rare Gram negative rods     Gram Stain (Respiratory) Few WBC's        Latest lactate reviewed, they are-  Recent Labs   Lab 12/14/20  1909   LACTATE 1.1     Source- Suspected GI    Source control Achieved by- Iv Abx  Appreciate ID recs. Seems to be stabilizing

## 2020-12-17 NOTE — PROGRESS NOTES
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 Ortez on 12/08/2020 due to colostomy prolapse) due to bilateral sacral decubitus ulcers. He is a resident of [facility] where he was sent from today for reports of tachycardia, coffee ground emesis,  and dark/black stool noted coming from his colostomy. His stool for occult blood is positive and he has had a noted two  point drop in his hgb and hct  from 3 days ago. His BP has been stable.  Patient is being placed in hospital for further evaluation and treatment including GI and Surgery consult, Iv Abx for sepsis, and close monitoring of his GI bleed.     Patient seen in ER.    I had a long discussion with Mother, Leni Moscoso, [phone], at bedside and updated her on patient's status. Patient is extremely debilitated with multiple comorbidities. Mother is the decision maker and states patient is a Full Code. I explained to her patient's guarded prognosis and she  "wants everything done".      Overview/Hospital Course:  12/14:  Patient overall stable overnight however continues to be tachycardic.  Range of between 01/20/2040.  His alkaline phosphatase has trended upwards.  Appreciate gastroenterology recommendations.  No evidence of active bleeding at this time per PEG colostomy.  Getting CT abdomen and pelvis with contrast for further insight into alkaline " phosphatase and GGT elevation.  Discussed case Dr. Ortez and GI Dr. Bautista. Consulted palliative care to discuss goals of care. Discussed with Amanda Colmenares (palliative) and mother does not want to consider what to do if his condition worsens    12/15:  Patient's condition continues being guarded.  Appreciate infectious disease recs.  Zyvox and fluconazole was stopped.  His CRP and ESR are high.  Following cultures.  Continues to be on Levaquin and Unasyn.  CT angiogram yesterday ruled out pulmonary embolism and however confirmed atelectasis some pulmonary effusion in the setting postoperative status.  Unfortunately does not have lot options for positive pressure as he cannot follow directions to use incentive spirometry and not able to get up and move around to mobilize fluid.  Discussed case with mother.  Continues to have elevated phosphatase.  No structural reason this imaging.  Will be going for upper endoscopy today.    12/16:  No significant change over night.  Patient does appear to be more alert and moving his eyes looking around today.  Mom has no new concerns. WBC stable from yesterday.  Heart rate appears to be similar stable around 130. Discussed with Infectious Disease who continues to follow patient and is watching for culture results.  No changes to the antibiotic therapy at this time.    12/17:  Heart rate has trended down over the last 24 hr which is reassuring.  However, since yesterday his oxygen requirement has increased.  Pending repeat chest x-ray this morning.  Sodium elevated and potassium low this morning.  Replacing potassium through G-tube and rechecking sodium after increasing fluid rate.  He is only been getting 50 mL/hr.  Increasing this to 100 mL per hour and will repeat sodium potassium level 2:00 p.m. this afternoon.  Unable to discuss with mother as she was not present in the room today.  Will round later to speak with her.    Interval History: Heart rate has trended down over  the last 24 hr which is reassuring.  However, since yesterday his oxygen requirement has increased.  Pending repeat chest x-ray this morning.  Sodium elevated and potassium low this morning.  Replacing potassium through G-tube and rechecking sodium after increasing fluid rate.  He is only been getting 50 mL/hr.  Increasing this to 100 mL per hour and will repeat sodium potassium level 2:00 p.m. this afternoon.  Resuming enteral nutrition with dietary consult. Unable to discuss with mother as she was not present in the room today.  Will round later to speak with her.    Review of Systems   Constitutional: Negative for chills and fever.   Eyes: Negative for visual disturbance.   Respiratory: Negative for cough and shortness of breath.    Cardiovascular: Negative for chest pain.   Gastrointestinal: Negative for abdominal pain.   Neurological: Negative for dizziness.     Objective:     Vital Signs (Most Recent):  Temp: 98.5 °F (36.9 °C) (12/17/20 0826)  Pulse: 107 (12/17/20 0900)  Resp: 20 (12/17/20 0826)  BP: 130/87 (12/17/20 0826)  SpO2: (!) 94 % (12/17/20 0900) Vital Signs (24h Range):  Temp:  [98.5 °F (36.9 °C)-100.8 °F (38.2 °C)] 98.5 °F (36.9 °C)  Pulse:  [104-133] 107  Resp:  [18-24] 20  SpO2:  [86 %-97 %] 94 %  BP: (120-140)/(81-97) 130/87     Weight: 42 kg (92 lb 9.5 oz)  Body mass index is 22.33 kg/m².    Intake/Output Summary (Last 24 hours) at 12/17/2020 1016  Last data filed at 12/17/2020 0600  Gross per 24 hour   Intake 750 ml   Output 1250 ml   Net -500 ml      Physical Exam  Vitals signs and nursing note reviewed.   Constitutional:       General: He is not in acute distress.     Appearance: He is well-developed. He is ill-appearing. He is not diaphoretic.      Comments: At mental baseline   HENT:      Head: Normocephalic and atraumatic.      Nose: Nose normal.   Eyes:      General:         Right eye: No discharge.         Left eye: No discharge.      Conjunctiva/sclera: Conjunctivae normal.      Pupils:  Pupils are equal, round, and reactive to light.      Comments: No purposeful eye movement     Neck:      Thyroid: No thyromegaly.      Trachea: Tracheostomy present.   Cardiovascular:      Rate and Rhythm: Regular rhythm. Tachycardia present.      Heart sounds: Normal heart sounds. No murmur.      Comments: Warm extremities  Pulmonary:      Effort: Pulmonary effort is normal. No respiratory distress.      Breath sounds: Normal breath sounds. No wheezing.   Abdominal:      General: There is no distension.      Palpations: Abdomen is soft.      Comments: PEG and colostomy in place. No evidence of bleeding   Skin:     General: Skin is warm.      Findings: No rash.   Neurological:      Mental Status: He is oriented to person, place, and time.   Psychiatric:         Behavior: Behavior normal.         Significant Labs:   Blood Culture: No results for input(s): LABBLOO in the last 48 hours.  BMP:   Recent Labs   Lab 12/17/20  0506   GLU 86   *   K 2.3*   *   CO2 21*   BUN 12   CREATININE 0.7   CALCIUM 7.0*     CBC:   Recent Labs   Lab 12/16/20  1100 12/17/20  0506   WBC 13.85* 15.69*   HGB 8.6* 8.0*   HCT 28.2* 25.8*   * 454*     CMP:   Recent Labs   Lab 12/17/20  0506   *   K 2.3*   *   CO2 21*   GLU 86   BUN 12   CREATININE 0.7   CALCIUM 7.0*   PROT 4.7*   ALBUMIN 1.2*   BILITOT 0.2   ALKPHOS 839*   AST 18   ALT 14   ANIONGAP 10   EGFRNONAA >60       Significant Imaging: I have reviewed all pertinent imaging results/findings within the past 24 hours.      Assessment/Plan:      * Sepsis  This patient does have evidence of infective focus  My overall impression is sepsis. Vital signs were reviewed and noted in progress note.  Antibiotics given-   Antibiotics (From admission, onward)    Start     Stop Route Frequency Ordered    12/14/20 1630  ampicillin-sulbactam 3 g in sodium chloride 0.9 % 100 mL IVPB (ready to mix system)      -- IV Every 6 hours (non-standard times) 12/14/20 1521     12/14/20 1630  levoFLOXacin 750 mg/150 mL IVPB 750 mg      -- IV Every 24 hours (non-standard times) 12/14/20 1521    12/13/20 2100  mupirocin 2 % ointment      12/18 2059 Nasl 2 times daily 12/13/20 1649        Cultures were taken-   Microbiology Results (last 7 days)     Procedure Component Value Units Date/Time    Blood culture x two cultures. Draw prior to antibiotics. [[id number]] Collected: 12/13/20 1530    Order Status: Completed Specimen: Blood from Peripheral, Antecubital, Left Updated: 12/17/20 0613     Blood Culture, Routine No Growth to date      No Growth to date      No Growth to date      No Growth to date    Narrative:      Aerobic and anaerobic    Blood culture x two cultures. Draw prior to antibiotics. [[id number]] Collected: 12/13/20 1536    Order Status: Completed Specimen: Blood from Peripheral, Antecubital, Left Updated: 12/17/20 0613     Blood Culture, Routine No Growth to date      No Growth to date      No Growth to date      No Growth to date    Narrative:      Aerobic and anaerobic    Aerobic culture [[id number]]  (Abnormal) Collected: 12/14/20 1208    Order Status: Completed Specimen: Wound from Buttocks, Right Updated: 12/16/20 1339     Aerobic Bacterial Culture STENOTROPHOMONAS (X.) MALTOPHILIA  Moderate  Susceptibility pending        ACINETOBACTER BAUMANNII COMPLEX  Many  Susceptibility pending      Narrative:      Right ischium stage 4 pressure injury    Culture, Respiratory with Gram Stain [[id number]]  (Abnormal)  (Susceptibility) Collected: 12/13/20 1855    Order Status: Completed Specimen: Respiratory from Sputum Updated: 12/16/20 1127     Respiratory Culture No S aureus or Pseudomonas isolated.      SERRATIA MARCESCENS  Moderate       Gram Stain (Respiratory) <10 epithelial cells per low power field.     Gram Stain (Respiratory) Few yeast     Gram Stain (Respiratory) Rare Gram negative rods     Gram Stain (Respiratory) Few WBC's        Latest lactate reviewed, they are-  Recent Labs    Lab 12/14/20  1909   LACTATE 1.1     Source- Suspected GI    Source control Achieved by- Iv Abx  Appreciate ID recs. Seems to be stabilizing        Atelectasis  In post-op period. Frequent repositioning. Cannot do Incentive spirometry    Candiduria  ID advised no tx. Iglesias removed.       Elevated alkaline phosphatase level  Improving significantly    Abnormal EKG  Cardiology felt no ischemic etiology and artifact likely       Sacral decubitus ulcer  Appreciate wound care assistance. 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 Recent cx negative for this      Hypertensive urgency  Monitor  Telemetry          Gastrointestinal hemorrhage  Hemoglobin with minimal change at this time (near baseline).  GI recs after EGD   1. IV Protonix BID  Then switch to via PEG BIX x 2 months  2. Carafate 1g suspension via PEG x 2 weeks  3. Restart tube feeds  4. Loosen external bumper to 3.5cm from skin    Severe malnutrition  Appreciate dietary recs. Realized today that dietary not on case. Consult placed and getting him back on enteral nutrition. This will help potassium balance      Status post colostomy  S/p recent Colostomy placement on 10/29/2020 followed by Revision due to Colostomy prolapse on 12/08/2020. Atelectasis in post-op period      Status post tracheostomy  Stable.      Seizure disorder  Will give home dose phenobarbital Iv- Discussed with pharmacy      Sinus tachycardia  Secondary to underlying condition/atelectasis    Cerebral palsy  Underlying complicating factor to current medical course.  He is at baseline is nonverbal and minimally responsive.  Discussion with  mom and also have consulted palliative care.  Continues to be full code.        VTE Risk Mitigation (From admission, onward)         Ordered     IP VTE HIGH RISK PATIENT  Once      12/13/20 1726     Place SHANELLE hose  Until discontinued      12/13/20 1726     Place SHANELLE hose  Until discontinued      12/13/20 1658                Discharge Planning   ROCÍO:      Code Status: Full Code   Is the patient medically ready for discharge?:     Reason for patient still in hospital (select all that apply): Patient trending condition  Discharge Plan A: Long-term acute care facility (LTAC)                  Layo Beck DO  Department of Hospital Medicine   [facility]

## 2020-12-17 NOTE — SUBJECTIVE & OBJECTIVE
Interval History: 25 year old man with cerebral palsy ,anemia.  All cultures reviewed .  -12/13- resp culture- serratia   12/10- Resp- Acinetobacter   E coli - -unasyn resistant     WBC   Component      Latest Ref Rng & Units 12/15/2020 12/14/2020 12/14/2020            7:09 PM  7:03 AM   WBC      3.90 - 12.70 K/uL 13.90 (H) 14.69 (H) 15.87 (H)   12/16- He is afebrile   Gluteal cultures- Acinetobacter /stenotrophomonas   12/13- resp culture- serratia   12/10- Resp- Acinetobacter   E coli - -unasyn resistant     Review of Systems   Unable to perform ROS: Patient nonverbal     Objective:     Vital Signs (Most Recent):  Temp: 99.1 °F (37.3 °C) (12/17/20 0409)  Pulse: 110 (12/17/20 0417)  Resp: (!) 24 (12/17/20 0417)  BP: 124/83 (12/17/20 0409)  SpO2: (!) 90 % (12/17/20 0417) Vital Signs (24h Range):  Temp:  [98.9 °F (37.2 °C)-100.8 °F (38.2 °C)] 99.1 °F (37.3 °C)  Pulse:  [109-146] 110  Resp:  [18-24] 24  SpO2:  [86 %-96 %] 90 %  BP: (120-140)/(81-97) 124/83     Weight: 42 kg (92 lb 9.5 oz)  Body mass index is 22.33 kg/m².    Estimated Creatinine Clearance: 113.8 mL/min (based on SCr of 0.6 mg/dL).    Physical Exam  Vitals signs and nursing note reviewed.   Constitutional:       General: He is not in acute distress.     Appearance: He is well-developed. He is ill-appearing. He is not diaphoretic.      Comments: At mental baseline   HENT:      Head: Normocephalic and atraumatic.      Nose: Nose normal.   Eyes:      General:         Right eye: No discharge.         Left eye: No discharge.      Conjunctiva/sclera: Conjunctivae normal.      Pupils: Pupils are equal, round, and reactive to light.      Comments: No purposeful eye movement     Neck:      Thyroid: No thyromegaly.      Trachea: Tracheostomy present.   Cardiovascular:      Rate and Rhythm: Regular rhythm. Tachycardia present.      Heart sounds: Normal heart sounds. No murmur.      Comments: Cool extremities  Pulmonary:      Effort: Pulmonary effort is  normal. No respiratory distress.      Breath sounds: Normal breath sounds. No wheezing.   Abdominal:      General: There is no distension.      Palpations: Abdomen is soft.      Comments: PEG and colostomy in place. No evidence of bleeding   Genitourinary:     Comments: catheter  Skin:     General: Skin is warm.      Findings: No rash.   Neurological:      Mental Status: Mental status is at baseline.         Significant Labs:   Blood Culture:   Recent Labs   Lab 11/13/20  2332 12/06/20  1345 12/06/20  1405 12/13/20  1530 12/13/20  1536   LABBLOO No growth after 5 days.  Gram stain aer bottle: Gram positive cocci in chains resembling Strep   Results called to and read back by: Zee Taylor RN 11/14/2020  22:56  ENTEROCOCCUS FAECIUM VRE* No growth after 5 days. No growth after 5 days. No Growth to date  No Growth to date  No Growth to date No Growth to date  No Growth to date  No Growth to date     BMP:   No results for input(s): GLU, NA, K, CL, CO2, BUN, CREATININE, CALCIUM, MG in the last 48 hours.  CBC:   Recent Labs   Lab 12/15/20  0944 12/16/20  1100   WBC 13.90* 13.85*   HGB 8.9* 8.6*   HCT 29.5* 28.2*   * 486*     CMP:   No results for input(s): NA, K, CL, CO2, GLU, BUN, CREATININE, CALCIUM, PROT, ALBUMIN, BILITOT, ALKPHOS, AST, ALT, ANIONGAP, EGFRNONAA in the last 48 hours.    Invalid input(s): ESTGFAFRICA  Respiratory Culture:   Recent Labs   Lab 08/31/20  2157 09/06/20  1103 10/03/20  1749 12/10/20  0514 12/13/20  1855   GSRESP <10 epithelial cells per low power field.  Rare WBC's  Rare Gram negative rods >10 epithelial cells per low power field  Rare WBC's  Rare Gram negative rods <10 epithelial cells per low power field.  Rare WBC's  Rare budding yeast <10 epithelial cells per low power field.  Moderate WBC's  Moderate Gram negative rods  Rare Gram positive cocci  Rare Gram negative diplococci <10 epithelial cells per low power field.  Few yeast  Rare Gram negative rods  Few  WBC's   RESPIRATORYC No S aureus or Pseudomonas isolated.  SERRATIA MARCESCENS  Moderate  * No S aureus or Pseudomonas isolated.  SERRATIA MARCESCENS  Moderate  * No S aureus or Pseudomonas isolated.  SERRATIA MARCESCENS  Few  * No S aureus or Pseudomonas isolated.  ACINETOBACTER BAUMANNII   Many  *  ESCHERICHIA COLI  Moderate  * No S aureus or Pseudomonas isolated.  SERRATIA MARCESCENS  Moderate  *     Wound Culture:   Recent Labs   Lab 12/14/20  1208   LABAERO STENOTROPHOMONAS (X.) MALTOPHILIA  Moderate  Susceptibility pending  *  ACINETOBACTER BAUMANNII COMPLEX  Many  Susceptibility pending  *     All pertinent labs within the past 24 hours have been reviewed.    Significant Imaging: I have reviewed all pertinent imaging results/findings within the past 24 hours.

## 2020-12-18 PROBLEM — E87.8 ELECTROLYTE ABNORMALITY: Status: ACTIVE | Noted: 2020-12-18

## 2020-12-18 LAB
ANION GAP SERPL CALC-SCNC: 12 MMOL/L (ref 8–16)
ANION GAP SERPL CALC-SCNC: 9 MMOL/L (ref 8–16)
BUN SERPL-MCNC: 7 MG/DL (ref 6–20)
BUN SERPL-MCNC: 8 MG/DL (ref 6–20)
CALCIUM SERPL-MCNC: 7.1 MG/DL (ref 8.7–10.5)
CALCIUM SERPL-MCNC: 7.1 MG/DL (ref 8.7–10.5)
CHLORIDE SERPL-SCNC: 117 MMOL/L (ref 95–110)
CHLORIDE SERPL-SCNC: 118 MMOL/L (ref 95–110)
CO2 SERPL-SCNC: 24 MMOL/L (ref 23–29)
CO2 SERPL-SCNC: 25 MMOL/L (ref 23–29)
CREAT SERPL-MCNC: 0.7 MG/DL (ref 0.5–1.4)
CREAT SERPL-MCNC: 0.7 MG/DL (ref 0.5–1.4)
EST. GFR  (AFRICAN AMERICAN): >60 ML/MIN/1.73 M^2
EST. GFR  (AFRICAN AMERICAN): >60 ML/MIN/1.73 M^2
EST. GFR  (NON AFRICAN AMERICAN): >60 ML/MIN/1.73 M^2
EST. GFR  (NON AFRICAN AMERICAN): >60 ML/MIN/1.73 M^2
GLUCOSE SERPL-MCNC: 78 MG/DL (ref 70–110)
GLUCOSE SERPL-MCNC: 83 MG/DL (ref 70–110)
MAGNESIUM SERPL-MCNC: 1.3 MG/DL (ref 1.6–2.6)
POTASSIUM SERPL-SCNC: 2.3 MMOL/L (ref 3.5–5.1)
POTASSIUM SERPL-SCNC: 4.1 MMOL/L (ref 3.5–5.1)
SODIUM SERPL-SCNC: 151 MMOL/L (ref 136–145)
SODIUM SERPL-SCNC: 154 MMOL/L (ref 136–145)

## 2020-12-18 PROCEDURE — 25000003 PHARM REV CODE 250: Performed by: INTERNAL MEDICINE

## 2020-12-18 PROCEDURE — 25000003 PHARM REV CODE 250: Performed by: NURSE PRACTITIONER

## 2020-12-18 PROCEDURE — 83735 ASSAY OF MAGNESIUM: CPT

## 2020-12-18 PROCEDURE — 63600175 PHARM REV CODE 636 W HCPCS: Performed by: INTERNAL MEDICINE

## 2020-12-18 PROCEDURE — 27100171 HC OXYGEN HIGH FLOW UP TO 24 HOURS

## 2020-12-18 PROCEDURE — 94761 N-INVAS EAR/PLS OXIMETRY MLT: CPT

## 2020-12-18 PROCEDURE — 99233 PR SUBSEQUENT HOSPITAL CARE,LEVL III: ICD-10-PCS | Mod: ,,, | Performed by: INTERNAL MEDICINE

## 2020-12-18 PROCEDURE — 99900026 HC AIRWAY MAINTENANCE (STAT)

## 2020-12-18 PROCEDURE — 94640 AIRWAY INHALATION TREATMENT: CPT

## 2020-12-18 PROCEDURE — 25000003 PHARM REV CODE 250: Performed by: FAMILY MEDICINE

## 2020-12-18 PROCEDURE — 25000242 PHARM REV CODE 250 ALT 637 W/ HCPCS: Performed by: INTERNAL MEDICINE

## 2020-12-18 PROCEDURE — C9113 INJ PANTOPRAZOLE SODIUM, VIA: HCPCS | Performed by: INTERNAL MEDICINE

## 2020-12-18 PROCEDURE — 99900035 HC TECH TIME PER 15 MIN (STAT)

## 2020-12-18 PROCEDURE — 99233 SBSQ HOSP IP/OBS HIGH 50: CPT | Mod: ,,, | Performed by: INTERNAL MEDICINE

## 2020-12-18 PROCEDURE — 21400001 HC TELEMETRY ROOM

## 2020-12-18 PROCEDURE — 80048 BASIC METABOLIC PNL TOTAL CA: CPT | Mod: 91

## 2020-12-18 RX ORDER — FUROSEMIDE 10 MG/ML
40 INJECTION INTRAMUSCULAR; INTRAVENOUS DAILY
Status: DISCONTINUED | OUTPATIENT
Start: 2020-12-19 | End: 2020-12-22

## 2020-12-18 RX ORDER — MICONAZOLE NITRATE 2 %
POWDER (GRAM) TOPICAL DAILY PRN
Status: DISCONTINUED | OUTPATIENT
Start: 2020-12-18 | End: 2020-12-22 | Stop reason: HOSPADM

## 2020-12-18 RX ORDER — DILTIAZEM HYDROCHLORIDE 30 MG/1
30 TABLET, FILM COATED ORAL EVERY 8 HOURS
Status: DISCONTINUED | OUTPATIENT
Start: 2020-12-18 | End: 2020-12-22 | Stop reason: HOSPADM

## 2020-12-18 RX ORDER — POTASSIUM CHLORIDE 1.5 G/1.58G
40 POWDER, FOR SOLUTION ORAL
Status: COMPLETED | OUTPATIENT
Start: 2020-12-18 | End: 2020-12-18

## 2020-12-18 RX ORDER — MAGNESIUM SULFATE HEPTAHYDRATE 40 MG/ML
2 INJECTION, SOLUTION INTRAVENOUS
Status: COMPLETED | OUTPATIENT
Start: 2020-12-18 | End: 2020-12-18

## 2020-12-18 RX ADMIN — LEVOFLOXACIN 750 MG: 750 INJECTION, SOLUTION INTRAVENOUS at 04:12

## 2020-12-18 RX ADMIN — ANTI-FUNGAL POWDER MICONAZOLE NITRATE TALC FREE: 1.42 POWDER TOPICAL at 09:12

## 2020-12-18 RX ADMIN — ALBUTEROL SULFATE 2.5 MG: 2.5 SOLUTION RESPIRATORY (INHALATION) at 12:12

## 2020-12-18 RX ADMIN — POTASSIUM CHLORIDE 40 MEQ: 1.5 POWDER, FOR SOLUTION ORAL at 04:12

## 2020-12-18 RX ADMIN — PHENOBARBITAL SODIUM 100.1 MG: 65 INJECTION INTRAMUSCULAR; INTRAVENOUS at 09:12

## 2020-12-18 RX ADMIN — MUPIROCIN: 20 OINTMENT TOPICAL at 09:12

## 2020-12-18 RX ADMIN — PANTOPRAZOLE SODIUM 40 MG: 40 INJECTION, POWDER, FOR SOLUTION INTRAVENOUS at 09:12

## 2020-12-18 RX ADMIN — MICONAZOLE NITRATE: 2 OINTMENT TOPICAL at 09:12

## 2020-12-18 RX ADMIN — MAGNESIUM SULFATE 2 G: 2 INJECTION INTRAVENOUS at 02:12

## 2020-12-18 RX ADMIN — AMPICILLIN SODIUM AND SULBACTAM SODIUM 3 G: 2; 1 INJECTION, POWDER, FOR SOLUTION INTRAMUSCULAR; INTRAVENOUS at 04:12

## 2020-12-18 RX ADMIN — FUROSEMIDE 40 MG: 10 INJECTION, SOLUTION INTRAMUSCULAR; INTRAVENOUS at 04:12

## 2020-12-18 RX ADMIN — MAGNESIUM SULFATE 2 G: 2 INJECTION INTRAVENOUS at 12:12

## 2020-12-18 RX ADMIN — DILTIAZEM HYDROCHLORIDE 30 MG: 30 TABLET, FILM COATED ORAL at 09:12

## 2020-12-18 RX ADMIN — POTASSIUM CHLORIDE 40 MEQ: 1.5 POWDER, FOR SOLUTION ORAL at 05:12

## 2020-12-18 RX ADMIN — DILTIAZEM HYDROCHLORIDE 30 MG: 30 TABLET, FILM COATED ORAL at 05:12

## 2020-12-18 RX ADMIN — ALBUTEROL SULFATE 2.5 MG: 2.5 SOLUTION RESPIRATORY (INHALATION) at 08:12

## 2020-12-18 RX ADMIN — AMPICILLIN SODIUM AND SULBACTAM SODIUM 3 G: 2; 1 INJECTION, POWDER, FOR SOLUTION INTRAMUSCULAR; INTRAVENOUS at 10:12

## 2020-12-18 RX ADMIN — DEXTROSE, SODIUM CHLORIDE, AND POTASSIUM CHLORIDE 100 ML/HR: 5; .45; .15 INJECTION INTRAVENOUS at 09:12

## 2020-12-18 RX ADMIN — POTASSIUM CHLORIDE 40 MEQ: 1.5 POWDER, FOR SOLUTION ORAL at 01:12

## 2020-12-18 RX ADMIN — METOPROLOL TARTRATE 25 MG: 25 TABLET, FILM COATED ORAL at 09:12

## 2020-12-18 NOTE — ASSESSMENT & PLAN NOTE
12/18-  Hypernatremia , Hypokalemia , Hypomagnesemia noted   Correct abnormalities as indicated

## 2020-12-18 NOTE — PLAN OF CARE
Patient AAOX 4  VSS.  Patient remained afebrile throughout shift.  Patient remained free of falls this shift  Patient 0/10 of pain this shift  Plan of care reviewed.  Pt is non verbal. Unable to verbalize understanding  Patient moving/turning with assistance  Colostomy emptied  Frequent weight shifting encouraged.  Patient ST on monitor  Bed low, side rails up x2, wheels locked, call light in reach.  Bed alarm maintained for safety.  Hourly rounding completed.  Will continue to monitor.

## 2020-12-18 NOTE — PROGRESS NOTES
12/18/20 0915   Handoff Report   Given To ROBBIN Sandoval   Pain/Comfort/Sleep   Preferred Pain Scale rFLACC (Revised Face Legs Arms Cry Consolability Scale)   rFLACC Pain Rating: - Face 1-->occasional grimace or frown, withdrawn, disinterested, appears sad or worried   rFLACC Pain Rating: - Legs 1-->uneasy, restless, tense, occasional tremors   rFLACC Pain Rating: - Activity 0-->lying quietly, normal position, moves easily   rFLACC Pain Rating: - Cry 0-->no cry (awake or asleep)   rFLACC Pain Rating: - Consolability 1-->reassured by occasional touching, hugging, or being talked to, distractible   rFLACC Score: 3   Nonverbal Indicators of Pain grimace;restless   Pain Management Interventions relaxation techniques promoted;position adjusted;diversional activity provided   Skin   Skin WDL ex   Skin Color/Characteristics without discoloration   Skin Temperature warm   Skin Moisture dry   Skin Elasticity slow return to original state   Skin Integrity wound   Specialty Bed/Overlay Air fluidized;Low air loss   Bed Support Surface Assessed   Jerry Risk Assessment   Sensory Perception 1-->completely limited   Moisture 3-->occasionally moist   Activity 1-->bedfast   Mobility 1-->completely immobile   Nutrition 1-->very poor   Friction and Shear 1-->problem   Jerry Score 8        Negative Pressure Wound Therapy  12/18/20    Placement Date: 12/18/20   Side: (c)   Location: Ischial tuberosity   NPWT Type Vacuum Therapy   Therapy Setting NPWT Continuous therapy   Pressure Setting NPWT 125 mmHg   Therapy Interventions NPWT Y connector   Sponges Inserted NPWT Black;2   General Output (mL) 0        Altered Skin Integrity 10/23/20 Right Ischial tuberosity Full thickness tissue loss with exposed bone, tendon, or muscle. Often includes undermining and tunneling. May extend into muscle and/or supporting structures.   Date First Assessed: 10/23/20   Altered Skin Integrity Present on Admission: yes  Side: Right  Location: Ischial  tuberosity  Description of Altered Skin Integrity: Full thickness tissue loss with exposed bone, tendon, or muscle. Often includes undermi...   Wound Image    Description of Altered Skin Integrity Full thickness tissue loss with exposed bone, tendon, or muscle. Often includes undermining and tunneling. May extend into muscle and/or supporting structures.   Dressing Appearance Intact;Moist drainage   Drainage Amount Moderate   Drainage Characteristics/Odor Serosanguineous   Appearance Red;Granulating;Moist;Bone   Tissue loss description Full thickness   Periwound Area Redness;Satellite lesion   Wound Length (cm) 5 cm   Wound Width (cm) 6.5 cm   Wound Depth (cm) 1.5 cm   Wound Volume (cm^3) 48.75 cm^3   Wound Surface Area (cm^2) 32.5 cm^2   Undermining (depth (cm)/location) 3cm from 9-1 o'clock   Care Cleansed with:;Wound cleanser;Applied:;Skin Barrier   Dressing Applied  (wound vac)   Dressing Change Due 12/21/20        Altered Skin Integrity 10/23/20 Left Ischial tuberosity Full thickness tissue loss with exposed bone, tendon, or muscle. Often includes undermining and tunneling. May extend into muscle and/or supporting structures.   Date First Assessed: 10/23/20   Altered Skin Integrity Present on Admission: yes  Side: Left  Location: Ischial tuberosity  Description of Altered Skin Integrity: Full thickness tissue loss with exposed bone, tendon, or muscle. Often includes undermin...   Description of Altered Skin Integrity Full thickness tissue loss with exposed bone, tendon, or muscle. Often includes undermining and tunneling. May extend into muscle and/or supporting structures.   Dressing Appearance Intact;Moist drainage   Drainage Amount Moderate   Drainage Characteristics/Odor Serosanguineous   Appearance Red;Granulating;Moist;Bone   Tissue loss description Full thickness   Periwound Area Redness;Satellite lesion   Wound Edges Open   Wound Length (cm) 4 cm   Wound Width (cm) 6 cm   Wound Depth (cm) 1.5 cm   Wound  Volume (cm^3) 36 cm^3   Wound Surface Area (cm^2) 24 cm^2   Undermining (depth (cm)/location) 3cm from 9-3 o'clock   Care Cleansed with:;Wound cleanser;Applied:;Skin Barrier   Dressing Applied  (wound vac)   Dressing Change Due 12/21/20        Altered Skin Integrity 11/13/20 Scrotum Full thickness tissue loss. Subcutaneous fat may be visible but bone, tendon or muscle are not exposed   Date First Assessed: 11/13/20   Altered Skin Integrity Present on Admission: yes  Location: Scrotum  Description of Altered Skin Integrity: Full thickness tissue loss. Subcutaneous fat may be visible but bone, tendon or muscle are not exposed   Description of Altered Skin Integrity Full thickness tissue loss. Subcutaneous fat may be visible but bone, tendon or muscle are not exposed   Dressing Appearance Open to air   Drainage Amount None   Appearance Red   Tissue loss description Full thickness   Periwound Area Blistered   Care Cleansed with:;Wound cleanser;Applied:;Skin Barrier   Dressing Hydrocolloid        Altered Skin Integrity 12/14/20   Buttocks Moisture associated dermatitis   Date First Assessed: 12/14/20   Altered Skin Integrity Present on Admission: yes  Side: (c)   Orientation: (c)   Location: (c) Buttocks  Primary Wound Type: Moisture associated dermatitis   Dressing Appearance Open to air   Drainage Amount None   Appearance Pink;Red   Tissue loss description Not applicable   Periwound Area Intact   Care Cleansed with:;Wound cleanser;Applied:;Skin Barrier   Skin Interventions   Pressure Reduction Devices specialty bed utilized;positioning supports utilized;heel offloading device utilized;foam padding utilized   Pressure Reduction Techniques frequent weight shift encouraged;heels elevated off bed;positioned off wounds   Skin Protection adhesive use limited;incontinence pads utilized;skin sealant/moisture barrier applied;tubing/devices free from skin contact     F/U visit with Mr. Moscoso for continued wound management. He is  "awake and alert, mild restlessness and occasional grimace noted with movement.   LLQ colostomy noted with pouch intact, moderate amount thick dark green/brown liquid stool noted, emptied 150 mL at this time. PEG tube intact with gauze drain sponge to site, no rodrigo tube breakdown noted. Trach site assessed with resolution of erythema to rodrigo tube skin.  Improved fungal MASD noted. Turned to right side with max assistance and positioned with pillows. Bilateral ischium dressings removed. 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 Moderate amount serosanguinous drainage noted bilaterally. Good improvement in rodrigo-wound skin condition noted. Cleansed all with vashe and patted dry. Rodrigo wound skin prepped using "crusting technique" with miconazole powder and cavilon spray. Edges lined with ostomy ring and window-paned with drape. One piece black foam cut to size and applied to fill each wound and sealed with drape. sensa trac pads applied to each wound and attached via Y-connecter to [facility] wound vac ULTA at continuous -125 mmHg low intensity suction with good seal. Patient then positioned on right side with wedge and pillow support, heels floated with towel rolls.  Posterior scrotum stage 3 pressure injury again noted, healing well and nearly re-epithelialized. There is mild blistering (serous fluid filled) to rodrigo wound skin of scrotum, and decreased edema since admit. Cleansed with saline, painted with cavilon, and duoderm applied to cover and protect from friction. Scrotum elevated with soft towel roll.  Recommend wound vac dressing change twice weekly. Dr. Bobo and primary nurse ROBBIN Sandoval visited during care and updated on plan of care. Will follow.   "

## 2020-12-18 NOTE — SUBJECTIVE & OBJECTIVE
Past Medical History:   Diagnosis Date    Acid reflux     Anemia of chronic disease 8/30/2020    Cerebral palsy     Colitis     Decubitus ulcer of ischial area, left, stage IV 9/10/2020    History of hip surgery     Osteomyelitis of pelvis 9/24/2020    Pneumoperitoneum 9/21/2020    Pressure injury of right ischium, stage 4     S/P percutaneous endoscopic gastrostomy (PEG) tube placement     Seizure disorder 8/26/2020    Seizures     Severe protein-calorie malnutrition     Sinus tachycardia 8/4/2020       Past Surgical History:   Procedure Laterality Date    CHOLECYSTECTOMY      ESOPHAGOGASTRODUODENOSCOPY N/A 12/15/2020    Procedure: EGD (ESOPHAGOGASTRODUODENOSCOPY);  Surgeon: Mickie Bautista MD;  Location: [facility] ENDO;  Service: Endoscopy;  Laterality: N/A;    EYE SURGERY      FLEXIBLE SIGMOIDOSCOPY N/A 9/9/2020    Procedure: SIGMOIDOSCOPY, FLEXIBLE;  Surgeon: America Goode MD;  [facility]  Service: Endoscopy;  Laterality: N/A;    HIP SURGERY      REVISION COLOSTOMY N/A 12/8/2020    Procedure: REVISION, COLOSTOMY;  Surgeon: Michael Ortez MD;  Location: [facility] OR;  Service: General;  Laterality: N/A;  with partial colectomy    TRACHEOSTOMY N/A 10/27/2020    Procedure: CREATION, TRACHEOSTOMY;  Surgeon: Kar Pamla MD;  Location: [facility] OR;  Service: ENT;  Laterality: N/A;       Review of patient's allergies indicates:   Allergen Reactions    Morphine sulfate Hives     Patient has tolerated morphine several times in the past.        Family History     Problem Relation (Age of Onset)    Cancer Maternal Grandfather        Tobacco Use    Smoking status: Never Smoker    Smokeless tobacco: Never Used   Substance and Sexual Activity    Alcohol use: Never     Frequency: Never    Drug use: Not Currently    Sexual activity: Never         Review of Systems   Unable to perform ROS: Dementia     Objective:     Vital Signs (Most Recent):  Temp: 98.6 °F (37 °C) (12/18/20  1108)  Pulse: 109 (12/18/20 1241)  Resp: 20 (12/18/20 1241)  BP: 130/85 (12/18/20 1108)  SpO2: 95 % (12/18/20 1241) Vital Signs (24h Range):  Temp:  [98.2 °F (36.8 °C)-99.8 °F (37.7 °C)] 98.6 °F (37 °C)  Pulse:  [] 109  Resp:  [18-20] 20  SpO2:  [90 %-98 %] 95 %  BP: (130-166)/() 130/85     Weight: 42 kg (92 lb 9.5 oz)  Body mass index is 22.33 kg/m².      Intake/Output Summary (Last 24 hours) at 12/18/2020 1607  Last data filed at 12/18/2020 0915  Gross per 24 hour   Intake --   Output 1200 ml   Net -1200 ml       Physical Exam  Vitals signs and nursing note reviewed.   Constitutional:       Appearance: He is ill-appearing and diaphoretic.   HENT:      Head: Normocephalic.      Nose: Nose normal.      Mouth/Throat:      Mouth: Mucous membranes are moist.      Comments: tracheosctomy  Eyes:      Pupils: Pupils are equal, round, and reactive to light.   Cardiovascular:      Rate and Rhythm: Tachycardia present.   Pulmonary:      Breath sounds: Examination of the left-upper field reveals decreased breath sounds and rales. Examination of the left-middle field reveals decreased breath sounds and rales. Examination of the left-lower field reveals decreased breath sounds and rales. Decreased breath sounds and rales present.   Abdominal:      General: There is distension.   Musculoskeletal:      Right lower leg: Edema present.      Left lower leg: Edema present.      Comments: Bilateral flexion contractures   Neurological:      Mental Status: Mental status is at baseline.         Vents:  Oxygen Concentration (%): 35 (12/17/20 1744)    Lines/Drains/Airways     Central Venous Catheter Line            Tunneled Central Line Insertion/Assessment - Double Lumen  09/28/20 1400 right subclavian 81 days          Drain                 Gastrostomy/Enterostomy 08/04/20 1653 Percutaneous endoscopic gastrostomy (PEG) feeding 136 days         Gastrostomy/Enterostomy 10/22/20 1300 LUQ 57 days         Colostomy 10/29/20 1349  Descending/sigmoid LLQ 50 days         Urethral Catheter 12/06/20 1207 Latex 16 Fr. 12 days          Airway                 Surgical Airway Shiley Cuffed -- days         Surgical Airway 10/27/20 0855 Shiley Cuffed 52 days         Airway - Non-Surgical 12/08/20 0924 Endotracheal Tube 10 days                Significant Labs:    CBC/Anemia Profile:  Recent Labs   Lab 12/17/20  0506   WBC 15.69*   HGB 8.0*   HCT 25.8*   *   MCV 92   RDW 16.9*        Chemistries:  Recent Labs   Lab 12/17/20  0506 12/17/20  1455 12/18/20  0950   * 151* 154*   K 2.3* 2.5* 2.3*   * 119* 117*   CO2 21* 22* 25   BUN 12 12 8   CREATININE 0.7 0.7 0.7   CALCIUM 7.0* 7.1* 7.1*   ALBUMIN 1.2*  --   --    PROT 4.7*  --   --    BILITOT 0.2  --   --    ALKPHOS 839*  --   --    ALT 14  --   --    AST 18  --   --    MG  --   --  1.3*       BMP:   Recent Labs   Lab 12/18/20  0950   GLU 83   *   K 2.3*   *   CO2 25   BUN 8   CREATININE 0.7   CALCIUM 7.1*   MG 1.3*     CMP:   Recent Labs   Lab 12/17/20  0506 12/17/20  1455 12/18/20  0950   * 151* 154*   K 2.3* 2.5* 2.3*   * 119* 117*   CO2 21* 22* 25   GLU 86 79 83   BUN 12 12 8   CREATININE 0.7 0.7 0.7   CALCIUM 7.0* 7.1* 7.1*   PROT 4.7*  --   --    ALBUMIN 1.2*  --   --    BILITOT 0.2  --   --    ALKPHOS 839*  --   --    AST 18  --   --    ALT 14  --   --    ANIONGAP 10 10 12   EGFRNONAA >60 >60 >60     All pertinent labs within the past 24 hours have been reviewed.    Significant Imaging:   I have reviewed all pertinent imaging results/findings within the past 24 hours.  I have reviewed and interpreted all pertinent imaging results/findings within the past 24 hours.       CT Chest Without Contrast  Narrative: EXAMINATION:  CT CHEST WITHOUT CONTRAST, multiplanar reconstructions    CLINICAL HISTORY:  Pleural effusion;    TECHNIQUE:  Axial images through the chest were obtained without the use of IV contrast. Sagittal and coronal <reconstructions are provided  for review>.    COMPARISON:  Comparisons are made to a chest x-ray performed earlier the same day, as well as a chest CT scan from December 14, 2020    FINDINGS:  Interval increase in size of right pleural effusion.  Similar size left pleural effusion noted.  Interval development of dense infiltrate involving the entire right upper lobe and lingula with similar degree of partial collapse of the left lower lobe with some mild residual aeration in the inferior lingula and central left lower lobe.  Similar degree of dependent atelectasis in the right lung base and ground-glass opacity in the right lower lobe.  Negative for pneumothorax.    There are no definite hilar or mediastinal masses or lymphadenopathy.    Stable tracheostomy cannula.  Stable right jugular central line.  The airways are otherwise patent.  The thyroid gland is normal.  The esophagus is normal.    Cholecystectomy clips.  Marked wall thickening of the colon again seen.  The upper abdominal organs are otherwise unchanged.    Osseous structures are unchanged.  Similar degree of moderate to marked anasarca.  Impression: 1.  Detrimental change.  There is now near complete atelectasis/collapse of the left upper lobe.  There is a similar degree of partial atelectasis/collapse of the left lower lobe.    2.  Worsening right effusion.  Left effusion is stable in size.    3.  Numerous stable findings as noted above, to include marked wall thickening of the colon concerning for colitis, tracheostomy cannula in place, right jugular central line, moderate anasarca and cholecystectomy clips.    All CT scans at this facility are performed  using dose modulation techniques as appropriate to performed exam including the following:  automated exposure control; adjustment of mA and/or kV according to the patients size (this includes techniques or standardized protocols for targeted exams where dose is matched to indication/reason for exam: i.e. extremities or head);   iterative reconstruction technique.    Electronically signed by: Mike Orozco MD  Date:    12/17/2020  Time:    15:12  X-Ray Chest AP Portable  Narrative: EXAMINATION:  XR CHEST AP PORTABLE    CLINICAL HISTORY:  increased oxygen need;    COMPARISON:  December 13, 2020    FINDINGS:  EKG leads overlie the chest.  There has been interval development of significant opacity involving the left hemithorax, much of which is related to alveolar infiltrate although most of the changes in the upper and lateral portions of the right pleural space appear to represent an effusion on this supine radiograph.  The right lung again demonstrates vascular crowding or atelectasis related to low lung volumes.  The cardiac silhouette size is normal. The trachea is midline and the mediastinal width is normal. Negative for right effusion or pneumothorax.  Pulmonary vasculature is normal. Negative for osseous abnormalities. Stable tracheostomy cannula and right arm PICC line.  Impression: 1.  Detrimental change.  There is significant opacity throughout the left hemithorax, a combination of effusion and infiltrate throughout the left hemithorax.  Pneumonia with parapneumonic effusion must be considered.    2.  Stable findings as noted above.    Electronically signed by: Mike Orozco MD  Date:    12/17/2020  Time:    12:59    CT Chest Without Contrast  Narrative: EXAMINATION:  CT CHEST WITHOUT CONTRAST, multiplanar reconstructions    CLINICAL HISTORY:  Pleural effusion;    TECHNIQUE:  Axial images through the chest were obtained without the use of IV contrast. Sagittal and coronal <reconstructions are provided for review>.    COMPARISON:  Comparisons are made to a chest x-ray performed earlier the same day, as well as a chest CT scan from December 14, 2020    FINDINGS:  Interval increase in size of right pleural effusion.  Similar size left pleural effusion noted.  Interval development of dense infiltrate involving the entire right upper  lobe and lingula with similar degree of partial collapse of the left lower lobe with some mild residual aeration in the inferior lingula and central left lower lobe.  Similar degree of dependent atelectasis in the right lung base and ground-glass opacity in the right lower lobe.  Negative for pneumothorax.    There are no definite hilar or mediastinal masses or lymphadenopathy.    Stable tracheostomy cannula.  Stable right jugular central line.  The airways are otherwise patent.  The thyroid gland is normal.  The esophagus is normal.    Cholecystectomy clips.  Marked wall thickening of the colon again seen.  The upper abdominal organs are otherwise unchanged.    Osseous structures are unchanged.  Similar degree of moderate to marked anasarca.  Impression: 1.  Detrimental change.  There is now near complete atelectasis/collapse of the left upper lobe.  There is a similar degree of partial atelectasis/collapse of the left lower lobe.    2.  Worsening right effusion.  Left effusion is stable in size.    3.  Numerous stable findings as noted above, to include marked wall thickening of the colon concerning for colitis, tracheostomy cannula in place, right jugular central line, moderate anasarca and cholecystectomy clips.    All CT scans at this facility are performed  using dose modulation techniques as appropriate to performed exam including the following:  automated exposure control; adjustment of mA and/or kV according to the patients size (this includes techniques or standardized protocols for targeted exams where dose is matched to indication/reason for exam: i.e. extremities or head);  iterative reconstruction technique.    Electronically signed by: Mike Orozco MD  Date:    12/17/2020  Time:    15:12  X-Ray Chest AP Portable  Narrative: EXAMINATION:  XR CHEST AP PORTABLE    CLINICAL HISTORY:  increased oxygen need;    COMPARISON:  December 13, 2020    FINDINGS:  EKG leads overlie the chest.  There has been  interval development of significant opacity involving the left hemithorax, much of which is related to alveolar infiltrate although most of the changes in the upper and lateral portions of the right pleural space appear to represent an effusion on this supine radiograph.  The right lung again demonstrates vascular crowding or atelectasis related to low lung volumes.  The cardiac silhouette size is normal. The trachea is midline and the mediastinal width is normal. Negative for right effusion or pneumothorax.  Pulmonary vasculature is normal. Negative for osseous abnormalities. Stable tracheostomy cannula and right arm PICC line.  Impression: 1.  Detrimental change.  There is significant opacity throughout the left hemithorax, a combination of effusion and infiltrate throughout the left hemithorax.  Pneumonia with parapneumonic effusion must be considered.    2.  Stable findings as noted above.    Electronically signed by: Mike Orozco MD  Date:    12/17/2020  Time:    12:59

## 2020-12-18 NOTE — ASSESSMENT & PLAN NOTE
12/17 Start diuretics and cut back on IV fluis  12/18 continue diuretics and monitor renal function

## 2020-12-18 NOTE — PROGRESS NOTES
[facility] -   Pulmonology  Progress Note    Patient Name: Glen Moscoso  MRN: [id number]  Admission Date: 12/13/2020  Hospital Length of Stay: 5 days  Code Status: Full Code  Attending Provider: Travis Bobo MD  Primary Care Provider: Yadi Lawson MD   Principal Problem: Sepsis    Subjective: little change     Past Medical History:   Diagnosis Date    Acid reflux     Anemia of chronic disease 8/30/2020    Cerebral palsy     Colitis     Decubitus ulcer of ischial area, left, stage IV 9/10/2020    History of hip surgery     Osteomyelitis of pelvis 9/24/2020    Pneumoperitoneum 9/21/2020    Pressure injury of right ischium, stage 4     S/P percutaneous endoscopic gastrostomy (PEG) tube placement     Seizure disorder 8/26/2020    Seizures     Severe protein-calorie malnutrition     Sinus tachycardia 8/4/2020       Past Surgical History:   Procedure Laterality Date    CHOLECYSTECTOMY      ESOPHAGOGASTRODUODENOSCOPY N/A 12/15/2020    Procedure: EGD (ESOPHAGOGASTRODUODENOSCOPY);  Surgeon: Mickie Bautista MD;  Location: [facility];  Service: Endoscopy;  Laterality: N/A;    EYE SURGERY      FLEXIBLE SIGMOIDOSCOPY N/A 9/9/2020    Procedure: SIGMOIDOSCOPY, FLEXIBLE;  Surgeon: America Goode MD;  Location: [facility] ([address] [place] [address]);  Service: Endoscopy;  Laterality: N/A;    HIP SURGERY      REVISION COLOSTOMY N/A 12/8/2020    Procedure: REVISION, COLOSTOMY;  Surgeon: Michael Ortez MD;  Location: [facility];  Service: General;  Laterality: N/A;  with partial colectomy    TRACHEOSTOMY N/A 10/27/2020    Procedure: CREATION, TRACHEOSTOMY;  Surgeon: Kar Palma MD;  Location: [facility];  Service: ENT;  Laterality: N/A;       Review of patient's allergies indicates:   Allergen Reactions    Morphine sulfate Hives     Patient has tolerated morphine several times in the past.        Family History     Problem Relation (Age of Onset)    Cancer Maternal Grandfather        Tobacco Use    Smoking  status: Never Smoker    Smokeless tobacco: Never Used   Substance and Sexual Activity    Alcohol use: Never     Frequency: Never    Drug use: Not Currently    Sexual activity: Never         Review of Systems   Unable to perform ROS: Dementia     Objective:     Vital Signs (Most Recent):  Temp: 98.6 °F (37 °C) (12/18/20 1108)  Pulse: 109 (12/18/20 1241)  Resp: 20 (12/18/20 1241)  BP: 130/85 (12/18/20 1108)  SpO2: 95 % (12/18/20 1241) Vital Signs (24h Range):  Temp:  [98.2 °F (36.8 °C)-99.8 °F (37.7 °C)] 98.6 °F (37 °C)  Pulse:  [] 109  Resp:  [18-20] 20  SpO2:  [90 %-98 %] 95 %  BP: (130-166)/() 130/85     Weight: 42 kg (92 lb 9.5 oz)  Body mass index is 22.33 kg/m².      Intake/Output Summary (Last 24 hours) at 12/18/2020 1607  Last data filed at 12/18/2020 0915  Gross per 24 hour   Intake --   Output 1200 ml   Net -1200 ml       Physical Exam  Vitals signs and nursing note reviewed.   Constitutional:       Appearance: He is ill-appearing and diaphoretic.   HENT:      Head: Normocephalic.      Nose: Nose normal.      Mouth/Throat:      Mouth: Mucous membranes are moist.      Comments: tracheosctomy  Eyes:      Pupils: Pupils are equal, round, and reactive to light.   Cardiovascular:      Rate and Rhythm: Tachycardia present.   Pulmonary:      Breath sounds: Examination of the left-upper field reveals decreased breath sounds and rales. Examination of the left-middle field reveals decreased breath sounds and rales. Examination of the left-lower field reveals decreased breath sounds and rales. Decreased breath sounds and rales present.   Abdominal:      General: There is distension.   Musculoskeletal:      Right lower leg: Edema present.      Left lower leg: Edema present.      Comments: Bilateral flexion contractures   Neurological:      Mental Status: Mental status is at baseline.         Vents:  Oxygen Concentration (%): 35 (12/17/20 1744)    Lines/Drains/Airways     Central Venous Catheter Line             Tunneled Central Line Insertion/Assessment - Double Lumen  09/28/20 1400 right subclavian 81 days          Drain                 Gastrostomy/Enterostomy 08/04/20 1653 Percutaneous endoscopic gastrostomy (PEG) feeding 136 days         Gastrostomy/Enterostomy 10/22/20 1300 LUQ 57 days         Colostomy 10/29/20 1349 Descending/sigmoid LLQ 50 days         Urethral Catheter 12/06/20 1207 Latex 16 Fr. 12 days          Airway                 Surgical Airway Shiley Cuffed -- days         Surgical Airway 10/27/20 0855 Shiley Cuffed 52 days         Airway - Non-Surgical 12/08/20 0924 Endotracheal Tube 10 days                Significant Labs:    CBC/Anemia Profile:  Recent Labs   Lab 12/17/20  0506   WBC 15.69*   HGB 8.0*   HCT 25.8*   *   MCV 92   RDW 16.9*        Chemistries:  Recent Labs   Lab 12/17/20  0506 12/17/20  1455 12/18/20  0950   * 151* 154*   K 2.3* 2.5* 2.3*   * 119* 117*   CO2 21* 22* 25   BUN 12 12 8   CREATININE 0.7 0.7 0.7   CALCIUM 7.0* 7.1* 7.1*   ALBUMIN 1.2*  --   --    PROT 4.7*  --   --    BILITOT 0.2  --   --    ALKPHOS 839*  --   --    ALT 14  --   --    AST 18  --   --    MG  --   --  1.3*       BMP:   Recent Labs   Lab 12/18/20  0950   GLU 83   *   K 2.3*   *   CO2 25   BUN 8   CREATININE 0.7   CALCIUM 7.1*   MG 1.3*     CMP:   Recent Labs   Lab 12/17/20  0506 12/17/20  1455 12/18/20  0950   * 151* 154*   K 2.3* 2.5* 2.3*   * 119* 117*   CO2 21* 22* 25   GLU 86 79 83   BUN 12 12 8   CREATININE 0.7 0.7 0.7   CALCIUM 7.0* 7.1* 7.1*   PROT 4.7*  --   --    ALBUMIN 1.2*  --   --    BILITOT 0.2  --   --    ALKPHOS 839*  --   --    AST 18  --   --    ALT 14  --   --    ANIONGAP 10 10 12   EGFRNONAA >60 >60 >60     All pertinent labs within the past 24 hours have been reviewed.    Significant Imaging:   I have reviewed all pertinent imaging results/findings within the past 24 hours.  I have reviewed and interpreted all pertinent imaging results/findings  within the past 24 hours.       CT Chest Without Contrast  Narrative: EXAMINATION:  CT CHEST WITHOUT CONTRAST, multiplanar reconstructions    CLINICAL HISTORY:  Pleural effusion;    TECHNIQUE:  Axial images through the chest were obtained without the use of IV contrast. Sagittal and coronal <reconstructions are provided for review>.    COMPARISON:  Comparisons are made to a chest x-ray performed earlier the same day, as well as a chest CT scan from December 14, 2020    FINDINGS:  Interval increase in size of right pleural effusion.  Similar size left pleural effusion noted.  Interval development of dense infiltrate involving the entire right upper lobe and lingula with similar degree of partial collapse of the left lower lobe with some mild residual aeration in the inferior lingula and central left lower lobe.  Similar degree of dependent atelectasis in the right lung base and ground-glass opacity in the right lower lobe.  Negative for pneumothorax.    There are no definite hilar or mediastinal masses or lymphadenopathy.    Stable tracheostomy cannula.  Stable right jugular central line.  The airways are otherwise patent.  The thyroid gland is normal.  The esophagus is normal.    Cholecystectomy clips.  Marked wall thickening of the colon again seen.  The upper abdominal organs are otherwise unchanged.    Osseous structures are unchanged.  Similar degree of moderate to marked anasarca.  Impression: 1.  Detrimental change.  There is now near complete atelectasis/collapse of the left upper lobe.  There is a similar degree of partial atelectasis/collapse of the left lower lobe.    2.  Worsening right effusion.  Left effusion is stable in size.    3.  Numerous stable findings as noted above, to include marked wall thickening of the colon concerning for colitis, tracheostomy cannula in place, right jugular central line, moderate anasarca and cholecystectomy clips.    All CT scans at this facility are performed  using  dose modulation techniques as appropriate to performed exam including the following:  automated exposure control; adjustment of mA and/or kV according to the patients size (this includes techniques or standardized protocols for targeted exams where dose is matched to indication/reason for exam: i.e. extremities or head);  iterative reconstruction technique.    Electronically signed by: Mike Orozco MD  Date:    12/17/2020  Time:    15:12  X-Ray Chest AP Portable  Narrative: EXAMINATION:  XR CHEST AP PORTABLE    CLINICAL HISTORY:  increased oxygen need;    COMPARISON:  December 13, 2020    FINDINGS:  EKG leads overlie the chest.  There has been interval development of significant opacity involving the left hemithorax, much of which is related to alveolar infiltrate although most of the changes in the upper and lateral portions of the right pleural space appear to represent an effusion on this supine radiograph.  The right lung again demonstrates vascular crowding or atelectasis related to low lung volumes.  The cardiac silhouette size is normal. The trachea is midline and the mediastinal width is normal. Negative for right effusion or pneumothorax.  Pulmonary vasculature is normal. Negative for osseous abnormalities. Stable tracheostomy cannula and right arm PICC line.  Impression: 1.  Detrimental change.  There is significant opacity throughout the left hemithorax, a combination of effusion and infiltrate throughout the left hemithorax.  Pneumonia with parapneumonic effusion must be considered.    2.  Stable findings as noted above.    Electronically signed by: Mike Orozco MD  Date:    12/17/2020  Time:    12:59    CT Chest Without Contrast  Narrative: EXAMINATION:  CT CHEST WITHOUT CONTRAST, multiplanar reconstructions    CLINICAL HISTORY:  Pleural effusion;    TECHNIQUE:  Axial images through the chest were obtained without the use of IV contrast. Sagittal and coronal <reconstructions are provided for  review>.    COMPARISON:  Comparisons are made to a chest x-ray performed earlier the same day, as well as a chest CT scan from December 14, 2020    FINDINGS:  Interval increase in size of right pleural effusion.  Similar size left pleural effusion noted.  Interval development of dense infiltrate involving the entire right upper lobe and lingula with similar degree of partial collapse of the left lower lobe with some mild residual aeration in the inferior lingula and central left lower lobe.  Similar degree of dependent atelectasis in the right lung base and ground-glass opacity in the right lower lobe.  Negative for pneumothorax.    There are no definite hilar or mediastinal masses or lymphadenopathy.    Stable tracheostomy cannula.  Stable right jugular central line.  The airways are otherwise patent.  The thyroid gland is normal.  The esophagus is normal.    Cholecystectomy clips.  Marked wall thickening of the colon again seen.  The upper abdominal organs are otherwise unchanged.    Osseous structures are unchanged.  Similar degree of moderate to marked anasarca.  Impression: 1.  Detrimental change.  There is now near complete atelectasis/collapse of the left upper lobe.  There is a similar degree of partial atelectasis/collapse of the left lower lobe.    2.  Worsening right effusion.  Left effusion is stable in size.    3.  Numerous stable findings as noted above, to include marked wall thickening of the colon concerning for colitis, tracheostomy cannula in place, right jugular central line, moderate anasarca and cholecystectomy clips.    All CT scans at this facility are performed  using dose modulation techniques as appropriate to performed exam including the following:  automated exposure control; adjustment of mA and/or kV according to the patients size (this includes techniques or standardized protocols for targeted exams where dose is matched to indication/reason for exam: i.e. extremities or head);   iterative reconstruction technique.    Electronically signed by: Mike Orozco MD  Date:    12/17/2020  Time:    15:12  X-Ray Chest AP Portable  Narrative: EXAMINATION:  XR CHEST AP PORTABLE    CLINICAL HISTORY:  increased oxygen need;    COMPARISON:  December 13, 2020    FINDINGS:  EKG leads overlie the chest.  There has been interval development of significant opacity involving the left hemithorax, much of which is related to alveolar infiltrate although most of the changes in the upper and lateral portions of the right pleural space appear to represent an effusion on this supine radiograph.  The right lung again demonstrates vascular crowding or atelectasis related to low lung volumes.  The cardiac silhouette size is normal. The trachea is midline and the mediastinal width is normal. Negative for right effusion or pneumothorax.  Pulmonary vasculature is normal. Negative for osseous abnormalities. Stable tracheostomy cannula and right arm PICC line.  Impression: 1.  Detrimental change.  There is significant opacity throughout the left hemithorax, a combination of effusion and infiltrate throughout the left hemithorax.  Pneumonia with parapneumonic effusion must be considered.    2.  Stable findings as noted above.    Electronically signed by: Mike Orozco MD  Date:    12/17/2020  Time:    12:59        ABG  Recent Labs   Lab 12/17/20  1720   PH 7.442   PO2 62*   PCO2 29.8*   HCO3 20.3*   BE -4     Assessment/Plan:     Bilateral pleural effusion  12/17 Start diuretics and cut back on IV fluis  12/18 continue diuretics and monitor renal function     Aspiration pneumonia  Start jet nebs and pulmonary toilet  12/18 continue above and check Chest X Ray in am           Maikel Garcia MD  Pulmonology  [facility] -

## 2020-12-18 NOTE — PLAN OF CARE
Unable to discuss Plan of Care with patient and no family in room - Patient remains awake and alert but is non verbal - remains free of falls, accidents and trauma during the day shift. Bed is in the low position and the call light is within reach. CXR and CT Chest completed - patient to remain NPO and with tube feedings held D/T aspiration.  Will continue to monitor

## 2020-12-18 NOTE — ASSESSMENT & PLAN NOTE
In post-op period. Frequent repositioning. Cannot do Incentive spirometry  Pulmonology consult obtained . Diuresis suggested for kathia pleural effusion   Consult RT for tracheal suctioning   Continue supp O2.   Repeat CXR in AM

## 2020-12-18 NOTE — PROGRESS NOTES
"[facility] Hospital Medicine  Progress Note    Patient Name: Glen Moscoso  MRN: [id number]  Patient Class: IP- Inpatient   Admission Date: 12/13/2020  Length of Stay: 5 days  Attending Physician: Travis Bobo MD  Primary Care Provider: Yadi Lawson MD        Subjective:     Principal Problem:Sepsis  Acute Condition: Lung Atelectasis and hypoxia         HPI:  Per ER report- This is a 24 yo male with PMHx of cerebal palsy, nonverbal, chronic resp failure, trach dependent, chronic anemia, peg tube placement, and prior colostomy placement for fecal diversion due to 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 Ortez on 12/08/2020 due to colostomy prolapse) due to bilateral sacral decubitus ulcers. He is a resident of [facility] where he was sent from today for reports of tachycardia, coffee ground emesis,  and dark/black stool noted coming from his colostomy. His stool for occult blood is positive and he has had a noted two  point drop in his hgb and hct  from 3 days ago. His BP has been stable.  Patient is being placed in hospital for further evaluation and treatment including GI and Surgery consult, Iv Abx for sepsis, and close monitoring of his GI bleed.     Patient seen in ER.    I had a long discussion with Mother, Leni Moscoso, [phone], at bedside and updated her on patient's status. Patient is extremely debilitated with multiple comorbidities. Mother is the decision maker and states patient is a Full Code. I explained to her patient's guarded prognosis and she  "wants everything done".      Overview/Hospital Course:  12/14:  Patient overall stable overnight however continues to be tachycardic.  Range of between 01/20/2040.  His alkaline phosphatase has trended upwards.  Appreciate gastroenterology recommendations.  No evidence of active bleeding at this time per PEG colostomy.  Getting CT abdomen and pelvis " with contrast for further insight into alkaline phosphatase and GGT elevation.  Discussed case Dr. Ortez and GI Dr. Bautista. Consulted palliative care to discuss goals of care. Discussed with Amanda Colmenares (palliative) and mother does not want to consider what to do if his condition worsens    12/15:  Patient's condition continues being guarded.  Appreciate infectious disease recs.  Zyvox and fluconazole was stopped.  His CRP and ESR are high.  Following cultures.  Continues to be on Levaquin and Unasyn.  CT angiogram yesterday ruled out pulmonary embolism and however confirmed atelectasis some pulmonary effusion in the setting postoperative status.  Unfortunately does not have lot options for positive pressure as he cannot follow directions to use incentive spirometry and not able to get up and move around to mobilize fluid.  Discussed case with mother.  Continues to have elevated phosphatase.  No structural reason this imaging.  Will be going for upper endoscopy today.    12/16:  No significant change over night.  Patient does appear to be more alert and moving his eyes looking around today.  Mom has no new concerns. WBC stable from yesterday.  Heart rate appears to be similar stable around 130. Discussed with Infectious Disease who continues to follow patient and is watching for culture results.  No changes to the antibiotic therapy at this time.    12/17:  Heart rate has trended down over the last 24 hr which is reassuring.  However, since yesterday his oxygen requirement has increased.  Pending repeat chest x-ray this morning.  Sodium elevated and potassium low this morning.  Replacing potassium through G-tube and rechecking sodium after increasing fluid rate.  He is only been getting 50 mL/hr.  Increasing this to 100 mL per hour and will repeat sodium potassium level 2:00 p.m. this afternoon.  Unable to discuss with mother as she was not present in the room today.  Will round later to speak with  her.    12/18- Remains on O2 10L/min vua trach collar . No acute distress noted .No tachypnea.  Remains tachycardiac . Add Diltiazem per G tube along with Metoprolol. Tube feed held per Pulmonology and Lasix IV initiated . Repeat CXR in am to evaluate Left lung atelectasis/collapse.Consult RT for tracheal suctioning.  Hypernatremia , hypokalemia and hypomagnesemia again noted. Will replete accordingly . Gentle hydration continues while on Lasix for kathia pleural effusion. Current antibiotic - Unasyn and Levofloxacin.     Interval History:     Remains on O2 10L/min vua trach collar . No acute distress noted .No tachypnea.  Remains tachycardiac . Add Diltiazem per G tube along with Metoprolol. Tube feed held per Pulmonology and Lasix IV initiated . Repeat CXR in am to evaluate Left lung atelectasis/collapse. Hypernatremia , hypokalemia and hypomagnesemia again noted. Will replete accordingly . Gentle hydration continues while on Lasix for kathia pleural effusion. Current antibiotic - Unasyn and Levofloxacin.       Review of Systems   Unable to perform ROS: Patient nonverbal     Objective:     Vital Signs (Most Recent):  Temp: 98.7 °F (37.1 °C) (12/18/20 1621)  Pulse: (!) 137 (12/18/20 1621)  Resp: 20 (12/18/20 1621)  BP: 139/77 (12/18/20 1621)  SpO2: (!) 94 % (12/18/20 1621) Vital Signs (24h Range):  Temp:  [98.2 °F (36.8 °C)-99.8 °F (37.7 °C)] 98.7 °F (37.1 °C)  Pulse:  [] 137  Resp:  [18-20] 20  SpO2:  [90 %-98 %] 94 %  BP: (130-166)/() 139/77     Weight: 42 kg (92 lb 9.5 oz)  Body mass index is 22.33 kg/m².    Intake/Output Summary (Last 24 hours) at 12/18/2020 7748  Last data filed at 12/18/2020 0915  Gross per 24 hour   Intake --   Output 1200 ml   Net -1200 ml      Physical Exam  Constitutional:       General: He is not in acute distress.     Appearance: He is well-developed. He is not diaphoretic.      Comments: Pt is awake, non verbal , opens eyes spontaneously , occasional facial grimace    HENT:       Head: Normocephalic and atraumatic.      Mouth/Throat:      Pharynx: No oropharyngeal exudate.   Eyes:      Conjunctiva/sclera: Conjunctivae normal.      Pupils: Pupils are equal, round, and reactive to light.   Neck:      Musculoskeletal: Neck supple.      Thyroid: No thyromegaly.      Vascular: No JVD.   Cardiovascular:      Rate and Rhythm: Regular rhythm. Tachycardia present.      Heart sounds: Normal heart sounds. No murmur.   Pulmonary:      Effort: No respiratory distress.      Breath sounds: No wheezing or rales.      Comments: Trach in place . O2 via trach collar   Decreased breath sounds at bases   Chest:      Chest wall: No tenderness.   Abdominal:      General: Bowel sounds are normal. There is no distension.      Palpations: Abdomen is soft.      Tenderness: There is no abdominal tenderness. There is no guarding or rebound.      Comments: Colostomy in place    Musculoskeletal:      Comments: Contracture U/L ext    Lymphadenopathy:      Cervical: No cervical adenopathy.   Skin:     Findings: No rash.      Comments: Avel ischium pressure ulcers with wound vac in place    Neurological:      Comments: Pt is awake , non verbal, does not follow commands with underlying cerebral palsy          Significant Labs:   CBC:   Recent Labs   Lab 12/17/20  0506   WBC 15.69*   HGB 8.0*   HCT 25.8*   *     CMP:   Recent Labs   Lab 12/17/20  0506 12/17/20  1455 12/18/20  0950   * 151* 154*   K 2.3* 2.5* 2.3*   * 119* 117*   CO2 21* 22* 25   GLU 86 79 83   BUN 12 12 8   CREATININE 0.7 0.7 0.7   CALCIUM 7.0* 7.1* 7.1*   PROT 4.7*  --   --    ALBUMIN 1.2*  --   --    BILITOT 0.2  --   --    ALKPHOS 839*  --   --    AST 18  --   --    ALT 14  --   --    ANIONGAP 10 10 12   EGFRNONAA >60 >60 >60       Significant Imaging:       Assessment/Plan:      * Sepsis  This patient does have evidence of infective focus  My overall impression is sepsis. Vital signs were reviewed and noted in progress note.  Antibiotics  given-   Antibiotics (From admission, onward)    Start     Stop Route Frequency Ordered    12/14/20 1630  ampicillin-sulbactam 3 g in sodium chloride 0.9 % 100 mL IVPB (ready to mix system)      -- IV Every 6 hours (non-standard times) 12/14/20 1521    12/14/20 1630  levoFLOXacin 750 mg/150 mL IVPB 750 mg      -- IV Every 24 hours (non-standard times) 12/14/20 1521        Cultures were taken-   Microbiology Results (last 7 days)     Procedure Component Value Units Date/Time    Aerobic culture [[id number]]  (Abnormal)  (Susceptibility) Collected: 12/14/20 1208    Order Status: Completed Specimen: Wound from Buttocks, Right Updated: 12/18/20 1331     Aerobic Bacterial Culture STENOTROPHOMONAS (X.) MALTOPHILIA  Moderate        ACINETOBACTER BAUMANNII   Many  Susceptibility pending      Narrative:      Right ischium stage 4 pressure injury    Culture, Respiratory with Gram Stain [[id number]] Collected: 12/17/20 1701    Order Status: Completed Specimen: Respiratory from Endotracheal Aspirate Updated: 12/18/20 1020     Gram Stain (Respiratory) <10 epithelial cells per low power field.     Gram Stain (Respiratory) Few WBC's     Gram Stain (Respiratory) Rare Gram negative rods    Blood culture x two cultures. Draw prior to antibiotics. [[id number]] Collected: 12/13/20 1530    Order Status: Completed Specimen: Blood from Peripheral, Antecubital, Left Updated: 12/18/20 0612     Blood Culture, Routine No Growth to date      No Growth to date      No Growth to date      No Growth to date      No Growth to date    Narrative:      Aerobic and anaerobic    Blood culture x two cultures. Draw prior to antibiotics. [[id number]] Collected: 12/13/20 1536    Order Status: Completed Specimen: Blood from Peripheral, Antecubital, Left Updated: 12/18/20 0612     Blood Culture, Routine No Growth to date      No Growth to date      No Growth to date      No Growth to date      No Growth to date    Narrative:      Aerobic and anaerobic     Culture, Respiratory with Gram Stain [[id number]]  (Abnormal)  (Susceptibility) Collected: 12/13/20 9550    Order Status: Completed Specimen: Respiratory from Sputum Updated: 12/16/20 1127     Respiratory Culture No S aureus or Pseudomonas isolated.      SERRATIA MARCESCENS  Moderate       Gram Stain (Respiratory) <10 epithelial cells per low power field.     Gram Stain (Respiratory) Few yeast     Gram Stain (Respiratory) Rare Gram negative rods     Gram Stain (Respiratory) Few WBC's        Latest lactate reviewed, they are-  No results for input(s): LACTATE in the last 72 hours.  Source- Suspected GI    Source control Achieved by- Iv Abx  Appreciate ID recs. Seems to be stabilizing        Atelectasis, left Lung with hypoxia   In post-op period. Frequent repositioning. Cannot do Incentive spirometry  Pulmonology consult obtained . Diuresis suggested for kathia pleural effusion   Consult RT for tracheal suctioning   Continue supp O2.   Repeat CXR in AM     Status post colostomy  S/p recent Colostomy placement on 10/29/2020 followed by Revision due to Colostomy prolapse on 12/08/2020. Atelectasis in post-op period      Status post tracheostomy  12/18- Consult RT for tracheal suctioning due to left lung atelectasis       Electrolyte abnormality  12/18-  Hypernatremia , Hypokalemia , Hypomagnesemia noted   Correct abnormalities as indicated        Bilateral pleural effusion  12/18-  Diuretic initiated       Candiduria  ID advised no tx. Iglesias removed.       Elevated alkaline phosphatase level  Improving significantly    Abnormal EKG  Cardiology felt no ischemic etiology and artifact likely       Sacral decubitus ulcer  Appreciate wound care assistance. Crp/esr elevated      Chronic indwelling Iglesias catheter  Colonized with candida      History of infection due to multidrug resistant Acinetobacter baumannii    Will use Unasyn for the  , contact isolation , continue Levaquin, stop zyvox for now      Will follow repeat  blood cultures    History of infection with vancomycin resistant Enterococcus (VRE)  Noted previously in blood culture in past. Recent cx negative for this      Hypertensive urgency  Monitor  Telemetry          Gastrointestinal hemorrhage  Hemoglobin with minimal change at this time (near baseline).  GI recs after EGD   1. IV Protonix BID  Then switch to via PEG BIX x 2 months  2. Carafate 1g suspension via PEG x 2 weeks  3. Restart tube feeds  4. Loosen external bumper to 3.5cm from skin    Severe malnutrition  Appreciate dietary recs. Realized today that dietary not on case. Consult placed and getting him back on enteral nutrition. This will help potassium balance      Seizure disorder  Will give home dose phenobarbital Iv- Discussed with pharmacy      Aspiration pneumonia  12/18-  Pulmonology held tube feed for now       Sinus tachycardia  Secondary to underlying condition/atelectasis.  Continue BB  Add CCB     Cerebral palsy  Underlying complicating factor to current medical course.  He is at baseline is nonverbal and minimally responsive.  Discussion with mom and also have consulted palliative care.  Continues to be full code.        VTE Risk Mitigation (From admission, onward)         Ordered     IP VTE HIGH RISK PATIENT  Once      12/13/20 1726     Place SHANELLE hose  Until discontinued      12/13/20 1726     Place SHANELLE hose  Until discontinued      12/13/20 1658                Discharge Planning   ROCÍO:      Code Status: Full Code   Is the patient medically ready for discharge?:     Reason for patient still in hospital (select all that apply): Patient trending condition  Discharge Plan A: Long-term acute care facility (LTAC)                  Travis Bobo MD  Department of Hospital Medicine   [facility]

## 2020-12-18 NOTE — RESPIRATORY THERAPY
Trach care done. Site cleaned. Inner cannula changed.HME replaced. Gauze placed under site to help prevent breakdown when trach sits on base of neck.

## 2020-12-18 NOTE — ASSESSMENT & PLAN NOTE
This patient does have evidence of infective focus  My overall impression is sepsis. Vital signs were reviewed and noted in progress note.  Antibiotics given-   Antibiotics (From admission, onward)    Start     Stop Route Frequency Ordered    12/14/20 1630  ampicillin-sulbactam 3 g in sodium chloride 0.9 % 100 mL IVPB (ready to mix system)      -- IV Every 6 hours (non-standard times) 12/14/20 1521    12/14/20 1630  levoFLOXacin 750 mg/150 mL IVPB 750 mg      -- IV Every 24 hours (non-standard times) 12/14/20 1521        Cultures were taken-   Microbiology Results (last 7 days)     Procedure Component Value Units Date/Time    Aerobic culture [[id number]]  (Abnormal)  (Susceptibility) Collected: 12/14/20 1208    Order Status: Completed Specimen: Wound from Buttocks, Right Updated: 12/18/20 1331     Aerobic Bacterial Culture STENOTROPHOMONAS (X.) MALTOPHILIA  Moderate        ACINETOBACTER BAUMANNII   Many  Susceptibility pending      Narrative:      Right ischium stage 4 pressure injury    Culture, Respiratory with Gram Stain [[id number]] Collected: 12/17/20 1701    Order Status: Completed Specimen: Respiratory from Endotracheal Aspirate Updated: 12/18/20 1020     Gram Stain (Respiratory) <10 epithelial cells per low power field.     Gram Stain (Respiratory) Few WBC's     Gram Stain (Respiratory) Rare Gram negative rods    Blood culture x two cultures. Draw prior to antibiotics. [[id number]] Collected: 12/13/20 1530    Order Status: Completed Specimen: Blood from Peripheral, Antecubital, Left Updated: 12/18/20 0612     Blood Culture, Routine No Growth to date      No Growth to date      No Growth to date      No Growth to date      No Growth to date    Narrative:      Aerobic and anaerobic    Blood culture x two cultures. Draw prior to antibiotics. [[id number]] Collected: 12/13/20 1536    Order Status: Completed Specimen: Blood from Peripheral, Antecubital, Left Updated: 12/18/20 0612     Blood Culture, Routine No  Growth to date      No Growth to date      No Growth to date      No Growth to date      No Growth to date    Narrative:      Aerobic and anaerobic    Culture, Respiratory with Gram Stain [[id number]]  (Abnormal)  (Susceptibility) Collected: 12/13/20 0010    Order Status: Completed Specimen: Respiratory from Sputum Updated: 12/16/20 1127     Respiratory Culture No S aureus or Pseudomonas isolated.      SERRATIA MARCESCENS  Moderate       Gram Stain (Respiratory) <10 epithelial cells per low power field.     Gram Stain (Respiratory) Few yeast     Gram Stain (Respiratory) Rare Gram negative rods     Gram Stain (Respiratory) Few WBC's        Latest lactate reviewed, they are-  No results for input(s): LACTATE in the last 72 hours.  Source- Suspected GI    Source control Achieved by- Iv Abx  Appreciate ID recs. Seems to be stabilizing

## 2020-12-18 NOTE — SUBJECTIVE & OBJECTIVE
Interval History:     Remains on O2 10L/min vua trach collar . No acute distress noted .No tachypnea.  Remains tachycardiac . Add Diltiazem per G tube along with Metoprolol. Tube feed held per Pulmonology and Lasix IV initiated . Repeat CXR in am to evaluate Left lung atelectasis/collapse. Hypernatremia , hypokalemia and hypomagnesemia again noted. Will replete accordingly . Gentle hydration continues while on Lasix for kathia pleural effusion. Current antibiotic - Unasyn and Levofloxacin.       Review of Systems   Unable to perform ROS: Patient nonverbal     Objective:     Vital Signs (Most Recent):  Temp: 98.7 °F (37.1 °C) (12/18/20 1621)  Pulse: (!) 137 (12/18/20 1621)  Resp: 20 (12/18/20 1621)  BP: 139/77 (12/18/20 1621)  SpO2: (!) 94 % (12/18/20 1621) Vital Signs (24h Range):  Temp:  [98.2 °F (36.8 °C)-99.8 °F (37.7 °C)] 98.7 °F (37.1 °C)  Pulse:  [] 137  Resp:  [18-20] 20  SpO2:  [90 %-98 %] 94 %  BP: (130-166)/() 139/77     Weight: 42 kg (92 lb 9.5 oz)  Body mass index is 22.33 kg/m².    Intake/Output Summary (Last 24 hours) at 12/18/2020 2267  Last data filed at 12/18/2020 0915  Gross per 24 hour   Intake --   Output 1200 ml   Net -1200 ml      Physical Exam  Constitutional:       General: He is not in acute distress.     Appearance: He is well-developed. He is not diaphoretic.      Comments: Pt is awake, non verbal , opens eyes spontaneously , occasional facial grimace    HENT:      Head: Normocephalic and atraumatic.      Mouth/Throat:      Pharynx: No oropharyngeal exudate.   Eyes:      Conjunctiva/sclera: Conjunctivae normal.      Pupils: Pupils are equal, round, and reactive to light.   Neck:      Musculoskeletal: Neck supple.      Thyroid: No thyromegaly.      Vascular: No JVD.   Cardiovascular:      Rate and Rhythm: Regular rhythm. Tachycardia present.      Heart sounds: Normal heart sounds. No murmur.   Pulmonary:      Effort: No respiratory distress.      Breath sounds: No wheezing or  rales.      Comments: Trach in place . O2 via trach collar   Decreased breath sounds at bases   Chest:      Chest wall: No tenderness.   Abdominal:      General: Bowel sounds are normal. There is no distension.      Palpations: Abdomen is soft.      Tenderness: There is no abdominal tenderness. There is no guarding or rebound.      Comments: Colostomy in place    Musculoskeletal:      Comments: Contracture U/L ext    Lymphadenopathy:      Cervical: No cervical adenopathy.   Skin:     Findings: No rash.      Comments: Avel ischium pressure ulcers with wound vac in place    Neurological:      Comments: Pt is awake , non verbal, does not follow commands with underlying cerebral palsy          Significant Labs:   CBC:   Recent Labs   Lab 12/17/20  0506   WBC 15.69*   HGB 8.0*   HCT 25.8*   *     CMP:   Recent Labs   Lab 12/17/20  0506 12/17/20  1455 12/18/20  0950   * 151* 154*   K 2.3* 2.5* 2.3*   * 119* 117*   CO2 21* 22* 25   GLU 86 79 83   BUN 12 12 8   CREATININE 0.7 0.7 0.7   CALCIUM 7.0* 7.1* 7.1*   PROT 4.7*  --   --    ALBUMIN 1.2*  --   --    BILITOT 0.2  --   --    ALKPHOS 839*  --   --    AST 18  --   --    ALT 14  --   --    ANIONGAP 10 10 12   EGFRNONAA >60 >60 >60       Significant Imaging:

## 2020-12-19 LAB
ALBUMIN SERPL BCP-MCNC: 1.3 G/DL (ref 3.5–5.2)
ALP SERPL-CCNC: 589 U/L (ref 55–135)
ALT SERPL W/O P-5'-P-CCNC: 12 U/L (ref 10–44)
ANION GAP SERPL CALC-SCNC: 7 MMOL/L (ref 8–16)
AST SERPL-CCNC: 19 U/L (ref 10–40)
BACTERIA BLD CULT: NORMAL
BACTERIA BLD CULT: NORMAL
BACTERIA SPEC AEROBE CULT: ABNORMAL
BACTERIA SPEC AEROBE CULT: ABNORMAL
BASOPHILS # BLD AUTO: 0.03 K/UL (ref 0–0.2)
BASOPHILS NFR BLD: 0.2 % (ref 0–1.9)
BILIRUB SERPL-MCNC: 0.1 MG/DL (ref 0.1–1)
BNP SERPL-MCNC: 262 PG/ML (ref 0–99)
BUN SERPL-MCNC: 6 MG/DL (ref 6–20)
CALCIUM SERPL-MCNC: 7.2 MG/DL (ref 8.7–10.5)
CHLORIDE SERPL-SCNC: 117 MMOL/L (ref 95–110)
CO2 SERPL-SCNC: 24 MMOL/L (ref 23–29)
CREAT SERPL-MCNC: 0.7 MG/DL (ref 0.5–1.4)
DIFFERENTIAL METHOD: ABNORMAL
EOSINOPHIL # BLD AUTO: 0.1 K/UL (ref 0–0.5)
EOSINOPHIL NFR BLD: 0.4 % (ref 0–8)
ERYTHROCYTE [DISTWIDTH] IN BLOOD BY AUTOMATED COUNT: 16.8 % (ref 11.5–14.5)
EST. GFR  (AFRICAN AMERICAN): >60 ML/MIN/1.73 M^2
EST. GFR  (NON AFRICAN AMERICAN): >60 ML/MIN/1.73 M^2
GLUCOSE SERPL-MCNC: 100 MG/DL (ref 70–110)
HCT VFR BLD AUTO: 27.8 % (ref 40–54)
HGB BLD-MCNC: 8.3 G/DL (ref 14–18)
IMM GRANULOCYTES # BLD AUTO: 0.12 K/UL (ref 0–0.04)
IMM GRANULOCYTES NFR BLD AUTO: 0.9 % (ref 0–0.5)
LYMPHOCYTES # BLD AUTO: 1.8 K/UL (ref 1–4.8)
LYMPHOCYTES NFR BLD: 12.8 % (ref 18–48)
MAGNESIUM SERPL-MCNC: 2.2 MG/DL (ref 1.6–2.6)
MCH RBC QN AUTO: 28 PG (ref 27–31)
MCHC RBC AUTO-ENTMCNC: 29.9 G/DL (ref 32–36)
MCV RBC AUTO: 94 FL (ref 82–98)
MONOCYTES # BLD AUTO: 1.5 K/UL (ref 0.3–1)
MONOCYTES NFR BLD: 10.8 % (ref 4–15)
NEUTROPHILS # BLD AUTO: 10.5 K/UL (ref 1.8–7.7)
NEUTROPHILS NFR BLD: 74.9 % (ref 38–73)
NRBC BLD-RTO: 0 /100 WBC
PHOSPHATE SERPL-MCNC: 2.2 MG/DL (ref 2.7–4.5)
PLATELET # BLD AUTO: 474 K/UL (ref 150–350)
PMV BLD AUTO: 9.7 FL (ref 9.2–12.9)
POTASSIUM SERPL-SCNC: 3.7 MMOL/L (ref 3.5–5.1)
PROT SERPL-MCNC: 5.2 G/DL (ref 6–8.4)
RBC # BLD AUTO: 2.96 M/UL (ref 4.6–6.2)
SODIUM SERPL-SCNC: 148 MMOL/L (ref 136–145)
WBC # BLD AUTO: 14.02 K/UL (ref 3.9–12.7)

## 2020-12-19 PROCEDURE — 63600175 PHARM REV CODE 636 W HCPCS: Performed by: INTERNAL MEDICINE

## 2020-12-19 PROCEDURE — C9113 INJ PANTOPRAZOLE SODIUM, VIA: HCPCS | Performed by: INTERNAL MEDICINE

## 2020-12-19 PROCEDURE — 83735 ASSAY OF MAGNESIUM: CPT

## 2020-12-19 PROCEDURE — 99900035 HC TECH TIME PER 15 MIN (STAT)

## 2020-12-19 PROCEDURE — 25000242 PHARM REV CODE 250 ALT 637 W/ HCPCS: Performed by: INTERNAL MEDICINE

## 2020-12-19 PROCEDURE — 25000003 PHARM REV CODE 250: Performed by: FAMILY MEDICINE

## 2020-12-19 PROCEDURE — 94761 N-INVAS EAR/PLS OXIMETRY MLT: CPT

## 2020-12-19 PROCEDURE — 25000003 PHARM REV CODE 250: Performed by: INTERNAL MEDICINE

## 2020-12-19 PROCEDURE — 99900026 HC AIRWAY MAINTENANCE (STAT)

## 2020-12-19 PROCEDURE — 94640 AIRWAY INHALATION TREATMENT: CPT

## 2020-12-19 PROCEDURE — 21400001 HC TELEMETRY ROOM

## 2020-12-19 PROCEDURE — 83880 ASSAY OF NATRIURETIC PEPTIDE: CPT

## 2020-12-19 PROCEDURE — 85025 COMPLETE CBC W/AUTO DIFF WBC: CPT

## 2020-12-19 PROCEDURE — 27100171 HC OXYGEN HIGH FLOW UP TO 24 HOURS

## 2020-12-19 PROCEDURE — 25000003 PHARM REV CODE 250: Performed by: NURSE PRACTITIONER

## 2020-12-19 PROCEDURE — 80053 COMPREHEN METABOLIC PANEL: CPT

## 2020-12-19 PROCEDURE — 84100 ASSAY OF PHOSPHORUS: CPT

## 2020-12-19 PROCEDURE — 99231 PR SUBSEQUENT HOSPITAL CARE,LEVL I: ICD-10-PCS | Mod: ,,, | Performed by: INTERNAL MEDICINE

## 2020-12-19 PROCEDURE — 99231 SBSQ HOSP IP/OBS SF/LOW 25: CPT | Mod: ,,, | Performed by: INTERNAL MEDICINE

## 2020-12-19 RX ORDER — SULFAMETHOXAZOLE AND TRIMETHOPRIM 800; 160 MG/1; MG/1
1 TABLET ORAL 2 TIMES DAILY
Status: DISCONTINUED | OUTPATIENT
Start: 2020-12-19 | End: 2020-12-22 | Stop reason: HOSPADM

## 2020-12-19 RX ORDER — SUCRALFATE 1 G/10ML
1 SUSPENSION ORAL EVERY 6 HOURS
Status: DISCONTINUED | OUTPATIENT
Start: 2020-12-19 | End: 2020-12-22 | Stop reason: HOSPADM

## 2020-12-19 RX ORDER — PANTOPRAZOLE SODIUM 40 MG/1
40 FOR SUSPENSION ORAL 2 TIMES DAILY
Status: DISCONTINUED | OUTPATIENT
Start: 2020-12-19 | End: 2020-12-22 | Stop reason: HOSPADM

## 2020-12-19 RX ORDER — LEVALBUTEROL INHALATION SOLUTION 0.63 MG/3ML
0.63 SOLUTION RESPIRATORY (INHALATION) EVERY 8 HOURS
Status: DISCONTINUED | OUTPATIENT
Start: 2020-12-19 | End: 2020-12-22 | Stop reason: HOSPADM

## 2020-12-19 RX ORDER — METOPROLOL TARTRATE 50 MG/1
50 TABLET ORAL 2 TIMES DAILY
Status: DISCONTINUED | OUTPATIENT
Start: 2020-12-19 | End: 2020-12-22 | Stop reason: HOSPADM

## 2020-12-19 RX ORDER — MINOCYCLINE HYDROCHLORIDE 50 MG/1
100 CAPSULE ORAL EVERY 12 HOURS
Status: DISCONTINUED | OUTPATIENT
Start: 2020-12-19 | End: 2020-12-22 | Stop reason: HOSPADM

## 2020-12-19 RX ADMIN — SULFAMETHOXAZOLE AND TRIMETHOPRIM 1 TABLET: 800; 160 TABLET ORAL at 08:12

## 2020-12-19 RX ADMIN — DILTIAZEM HYDROCHLORIDE 30 MG: 30 TABLET, FILM COATED ORAL at 01:12

## 2020-12-19 RX ADMIN — AMPICILLIN SODIUM AND SULBACTAM SODIUM 3 G: 2; 1 INJECTION, POWDER, FOR SOLUTION INTRAMUSCULAR; INTRAVENOUS at 05:12

## 2020-12-19 RX ADMIN — SUCRALFATE 1 G: 1 SUSPENSION ORAL at 06:12

## 2020-12-19 RX ADMIN — ALBUTEROL SULFATE 2.5 MG: 2.5 SOLUTION RESPIRATORY (INHALATION) at 12:12

## 2020-12-19 RX ADMIN — MICONAZOLE NITRATE: 2 OINTMENT TOPICAL at 09:12

## 2020-12-19 RX ADMIN — SULFAMETHOXAZOLE AND TRIMETHOPRIM 1 TABLET: 800; 160 TABLET ORAL at 09:12

## 2020-12-19 RX ADMIN — PANTOPRAZOLE SODIUM 40 MG: 40 INJECTION, POWDER, FOR SOLUTION INTRAVENOUS at 08:12

## 2020-12-19 RX ADMIN — MINOCYCLINE HYDROCHLORIDE 100 MG: 50 CAPSULE ORAL at 09:12

## 2020-12-19 RX ADMIN — DILTIAZEM HYDROCHLORIDE 30 MG: 30 TABLET, FILM COATED ORAL at 09:12

## 2020-12-19 RX ADMIN — AMPICILLIN SODIUM AND SULBACTAM SODIUM 3 G: 2; 1 INJECTION, POWDER, FOR SOLUTION INTRAMUSCULAR; INTRAVENOUS at 08:12

## 2020-12-19 RX ADMIN — ALBUTEROL SULFATE 2.5 MG: 2.5 SOLUTION RESPIRATORY (INHALATION) at 02:12

## 2020-12-19 RX ADMIN — METOPROLOL TARTRATE 50 MG: 50 TABLET, FILM COATED ORAL at 09:12

## 2020-12-19 RX ADMIN — FUROSEMIDE 40 MG: 10 INJECTION, SOLUTION INTRAMUSCULAR; INTRAVENOUS at 08:12

## 2020-12-19 RX ADMIN — PHENOBARBITAL SODIUM 100.1 MG: 65 INJECTION INTRAMUSCULAR; INTRAVENOUS at 10:12

## 2020-12-19 RX ADMIN — METOPROLOL TARTRATE 25 MG: 25 TABLET, FILM COATED ORAL at 08:12

## 2020-12-19 RX ADMIN — MINOCYCLINE HYDROCHLORIDE 100 MG: 50 CAPSULE ORAL at 08:12

## 2020-12-19 RX ADMIN — ALBUTEROL SULFATE 2.5 MG: 2.5 SOLUTION RESPIRATORY (INHALATION) at 08:12

## 2020-12-19 RX ADMIN — ANTI-FUNGAL POWDER MICONAZOLE NITRATE TALC FREE: 1.42 POWDER TOPICAL at 08:12

## 2020-12-19 RX ADMIN — DILTIAZEM HYDROCHLORIDE 30 MG: 30 TABLET, FILM COATED ORAL at 05:12

## 2020-12-19 RX ADMIN — PANTOPRAZOLE SODIUM 40 MG: 40 GRANULE, DELAYED RELEASE ORAL at 09:12

## 2020-12-19 RX ADMIN — AMPICILLIN SODIUM AND SULBACTAM SODIUM 3 G: 2; 1 INJECTION, POWDER, FOR SOLUTION INTRAMUSCULAR; INTRAVENOUS at 01:12

## 2020-12-19 RX ADMIN — LEVALBUTEROL HYDROCHLORIDE 0.63 MG: 0.63 SOLUTION RESPIRATORY (INHALATION) at 06:12

## 2020-12-19 NOTE — PLAN OF CARE
Unable to discuss Plan of Care with patient and no family in the room - Patient remains awake and alert but non verbal. - remains free of falls, accidents and trauma during the day shift. Bed is in the low position and the call light is within reach. Wound vac placed to bilateral buttocks by wound care nurse - Potassium and Magnesium replaced today. Changed central line dressing today. Will continue to monitor

## 2020-12-19 NOTE — PROGRESS NOTES
Pulmonary / Critical Care Follow up Note    History reviewed , patient examined, lab and radiographic studies reviewed.    Chest X Ray : left lung inflated - aspiration pneumonia resolved    Exam stable.  Vital Signs (Most Recent)  Temp: 98.5 °F (36.9 °C) (12/19/20 1557)  Pulse: (!) 131 (12/19/20 1557)  Resp: 18 (12/19/20 1557)  BP: 120/80 (12/19/20 1557)  SpO2: (!) 92 % (12/19/20 1557)    Vital Signs Range (Last 24H):  Temp:  [98.5 °F (36.9 °C)-98.9 °F (37.2 °C)]   Pulse:  [103-131]   Resp:  [16-22]   BP: (112-183)/(68-99)   SpO2:  [92 %-97 %]     Intake/Output last 3 shifts:  I/O last 3 completed shifts:  In: 1060 [I.V.:600; NG/GT:460]  Out: 6555 [Urine:5525; Stool:1030]    Intake/Output this shift:  I/O this shift:  In: 334 [Other:34; IV Piggyback:300]  Out: 2100 [Urine:1850; Emesis/NG output:250]      Assessment:  Patient Active Problem List   Diagnosis    Cerebral palsy    Sinus tachycardia    Aspiration pneumonia    Proctocolitis    Seizure disorder    Bacterial infection due to Serratia    Anemia, unspecified    Hypernatremia    Decubitus ulcer of ischial area, left, stage IV    Decubitus ulcers    Pneumoperitoneum    Moderate protein-calorie malnutrition    Colitis    Osteomyelitis of pelvis, presumed     Right lower lobe pneumonia    Status post tracheostomy    Osteomyelitis, pelvis    Status post colostomy    Functional quadriplegia    Tachycardia    UTI (urinary tract infection)    Severe malnutrition    Gastrointestinal hemorrhage    Hypertensive urgency    Sepsis    History of infection with vancomycin resistant Enterococcus (VRE)    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

## 2020-12-19 NOTE — PLAN OF CARE
Problem: Fall Injury Risk  Goal: Absence of Fall and Fall-Related Injury  Outcome: Ongoing, Progressing  Intervention: Identify and Manage Contributors to Fall Injury Risk  Flowsheets (Taken 12/19/2020 0055)  Medication Review/Management: medications reviewed  Intervention: Promote Injury-Free Environment  Flowsheets (Taken 12/19/2020 0055)  Safety Promotion/Fall Prevention: side rails raised x 3  Environmental Safety Modification: assistive device/personal items within reach     Problem: Adult Inpatient Plan of Care  Goal: Plan of Care Review  Outcome: Ongoing, Progressing  Goal: Patient-Specific Goal (Individualization)  Outcome: Ongoing, Progressing  Goal: Absence of Hospital-Acquired Illness or Injury  Outcome: Ongoing, Progressing  Intervention: Identify and Manage Fall Risk  Flowsheets (Taken 12/19/2020 0055)  Safety Promotion/Fall Prevention: side rails raised x 3  Goal: Optimal Comfort and Wellbeing  Outcome: Ongoing, Progressing  Goal: Readiness for Transition of Care  Outcome: Ongoing, Progressing  Goal: Rounds/Family Conference  Outcome: Ongoing, Progressing     Problem: Fluid Imbalance (Pneumonia)  Goal: Fluid Balance  Outcome: Ongoing, Progressing  Intervention: Monitor and Manage Fluid Balance  Flowsheets (Taken 12/19/2020 0055)  Fluid/Electrolyte Management: (IV fluids in place) other (see comments)     Problem: Infection (Pneumonia)  Goal: Resolution of Infection Signs/Symptoms  Outcome: Ongoing, Progressing     Problem: Respiratory Compromise (Pneumonia)  Goal: Effective Oxygenation and Ventilation  Outcome: Ongoing, Progressing     Problem: Infection  Goal: Infection Symptom Resolution  Outcome: Ongoing, Progressing     Problem: Wound  Goal: Optimal Wound Healing  Outcome: Ongoing, Progressing  Intervention: Promote Effective Wound Healing  Flowsheets (Taken 12/19/2020 0055)  Oral Nutrition Promotion: (tube feeds held. Provider aware) other (see comments)     Problem: Adjustment to Illness  (Sepsis/Septic Shock)  Goal: Optimal Coping  Outcome: Ongoing, Progressing  Intervention: Optimize Psychosocial Adjustment to Illness  Flowsheets (Taken 12/19/2020 0055)  Supportive Measures: (patient unable to perform activities) other (see comments)  Family/Support System Care: caregiver stress acknowledged     Problem: Bleeding (Sepsis/Septic Shock)  Goal: Absence of Bleeding  Outcome: Ongoing, Progressing     Problem: Glycemic Control Impaired (Sepsis/Septic Shock)  Goal: Blood Glucose Level Within Desired Range  Outcome: Ongoing, Progressing  Intervention: Optimize Glycemic Control  Flowsheets (Taken 12/19/2020 0055)  Glycemic Management: other (see comments)     Problem: Hemodynamic Instability (Sepsis/Septic Shock)  Goal: Effective Tissue Perfusion  Outcome: Ongoing, Progressing     Problem: Infection (Sepsis/Septic Shock)  Goal: Absence of Infection Signs/Symptoms  Outcome: Ongoing, Progressing     Problem: Nutrition Impaired (Sepsis/Septic Shock)  Goal: Optimal Nutrition Intake  Outcome: Ongoing, Progressing     Problem: Respiratory Compromise (Sepsis/Septic Shock)  Goal: Effective Oxygenation and Ventilation  Outcome: Ongoing, Progressing  Intervention: Promote Airway Secretion Clearance  Flowsheets (Taken 12/19/2020 0055)  Activity Management: patient unable to perform activities     Problem: Skin Injury Risk Increased  Goal: Skin Health and Integrity  Outcome: Ongoing, Progressing  Intervention: Optimize Skin Protection  Flowsheets (Taken 12/19/2020 0055)  Pressure Reduction Techniques: heels elevated off bed  Pressure Reduction Devices: foam padding utilized  Head of Bed (HOB): HOB at 45 degrees  Intervention: Promote and Optimize Oral Intake  Flowsheets (Taken 12/19/2020 0055)  Oral Nutrition Promotion: (tube feeds held. Provider aware) other (see comments)     Problem: Coping Ineffective  Goal: Effective Coping  Outcome: Ongoing, Progressing  Intervention: Support and Enhance Coping  Strategies  Flowsheets (Taken 12/19/2020 0055)  Complementary Therapy: (calming voice and intervention) other (see comments)  Supportive Measures: (patient unable to perform activities) other (see comments)  Family/Support System Care: caregiver stress acknowledged

## 2020-12-19 NOTE — PLAN OF CARE
POC reviewed, verbalized understanding. Pt remained free from falls, fall precautions in place. Pt is ST on monitor, 8588. VSS. No other c/o at this time. PIV intact, infusing D5 c/ KCL @ 100 mL/hr. 8L Trach collar. Iglesias still in place. Peg tube- Isosource 1.5 @ 10 mL/hr started, goal 40 mL/hr. Colostomy still intact. Call bell and personal belongings within reach. Hourly rounding complete. Reminded to call for assistance. Will continue to monitor.

## 2020-12-19 NOTE — SUBJECTIVE & OBJECTIVE
Interval History:   Follow up CXR resulted marked improvement in pulmonary aeration in the left lung with near resolution of atelectasis . O2 down to 8L/min. ST on monitor . Increase Metoprolol to 50 mg bid. Resume tube feeds today . Wound vac to kathia ischium placed yesterday.  Antibiotic changed to Unasyn, Minocycline  and Bactrim per ID. Possible DC in 24 to 48h         Review of Systems   Unable to perform ROS: Patient nonverbal     Objective:     Vital Signs (Most Recent):  Temp: 98.5 °F (36.9 °C) (12/19/20 1557)  Pulse: (!) 122 (12/19/20 1700)  Resp: 18 (12/19/20 1557)  BP: 120/80 (12/19/20 1557)  SpO2: (!) 92 % (12/19/20 1557) Vital Signs (24h Range):  Temp:  [98.5 °F (36.9 °C)-98.9 °F (37.2 °C)] 98.5 °F (36.9 °C)  Pulse:  [103-131] 122  Resp:  [16-22] 18  SpO2:  [92 %-97 %] 92 %  BP: (112-183)/(68-99) 120/80     Weight: 42 kg (92 lb 9.5 oz)  Body mass index is 22.33 kg/m².    Intake/Output Summary (Last 24 hours) at 12/19/2020 1757  Last data filed at 12/19/2020 1714  Gross per 24 hour   Intake 334 ml   Output 3755 ml   Net -3421 ml      Physical Exam  Constitutional:       General: He is not in acute distress.     Appearance: He is well-developed. He is not diaphoretic.      Comments: Awake , non verbal    HENT:      Head: Normocephalic and atraumatic.      Mouth/Throat:      Pharynx: No oropharyngeal exudate.   Eyes:      Conjunctiva/sclera: Conjunctivae normal.      Pupils: Pupils are equal, round, and reactive to light.   Neck:      Musculoskeletal: Neck supple.      Thyroid: No thyromegaly.      Vascular: No JVD.   Cardiovascular:      Rate and Rhythm: Regular rhythm. Tachycardia present.      Heart sounds: Normal heart sounds. No murmur.   Pulmonary:      Effort: Pulmonary effort is normal. No respiratory distress.      Breath sounds: No wheezing or rales.      Comments: Trach in place. Coarse breath sounds kathia   Chest:      Chest wall: No tenderness.   Abdominal:      General: Bowel sounds are normal.  There is distension (mild).      Palpations: Abdomen is soft.      Tenderness: There is no abdominal tenderness. There is no guarding or rebound.      Comments: PEG tube and colostomy in place   Musculoskeletal:      Comments: U/L ext contracture    Lymphadenopathy:      Cervical: No cervical adenopathy.   Skin:     General: Skin is warm and dry.      Comments: Wound vac in place    Neurological:      Comments: Awake , non verbal          Significant Labs:   CBC:   Recent Labs   Lab 12/19/20  0556   WBC 14.02*   HGB 8.3*   HCT 27.8*   *     CMP:   Recent Labs   Lab 12/18/20  0950 12/18/20  1825 12/19/20  0556   * 151* 148*   K 2.3* 4.1 3.7   * 118* 117*   CO2 25 24 24   GLU 83 78 100   BUN 8 7 6   CREATININE 0.7 0.7 0.7   CALCIUM 7.1* 7.1* 7.2*   PROT  --   --  5.2*   ALBUMIN  --   --  1.3*   BILITOT  --   --  0.1   ALKPHOS  --   --  589*   AST  --   --  19   ALT  --   --  12   ANIONGAP 12 9 7*   EGFRNONAA >60 >60 >60       Significant Imaging:

## 2020-12-20 LAB
ANION GAP SERPL CALC-SCNC: 7 MMOL/L (ref 8–16)
BASOPHILS # BLD AUTO: 0.05 K/UL (ref 0–0.2)
BASOPHILS NFR BLD: 0.3 % (ref 0–1.9)
BUN SERPL-MCNC: 7 MG/DL (ref 6–20)
CALCIUM SERPL-MCNC: 7.3 MG/DL (ref 8.7–10.5)
CHLORIDE SERPL-SCNC: 116 MMOL/L (ref 95–110)
CO2 SERPL-SCNC: 24 MMOL/L (ref 23–29)
CREAT SERPL-MCNC: 0.7 MG/DL (ref 0.5–1.4)
DIFFERENTIAL METHOD: ABNORMAL
EOSINOPHIL # BLD AUTO: 0.6 K/UL (ref 0–0.5)
EOSINOPHIL NFR BLD: 3.5 % (ref 0–8)
ERYTHROCYTE [DISTWIDTH] IN BLOOD BY AUTOMATED COUNT: 16.7 % (ref 11.5–14.5)
EST. GFR  (AFRICAN AMERICAN): >60 ML/MIN/1.73 M^2
EST. GFR  (NON AFRICAN AMERICAN): >60 ML/MIN/1.73 M^2
FINAL PATHOLOGIC DIAGNOSIS: NORMAL
FOLATE SERPL-MCNC: 5.9 NG/ML (ref 4–24)
GLUCOSE SERPL-MCNC: 95 MG/DL (ref 70–110)
GROSS: NORMAL
HCT VFR BLD AUTO: 28.8 % (ref 40–54)
HGB BLD-MCNC: 8.6 G/DL (ref 14–18)
IMM GRANULOCYTES # BLD AUTO: 0.17 K/UL (ref 0–0.04)
IMM GRANULOCYTES NFR BLD AUTO: 1 % (ref 0–0.5)
IRON SERPL-MCNC: 26 UG/DL (ref 45–160)
LYMPHOCYTES # BLD AUTO: 2.1 K/UL (ref 1–4.8)
LYMPHOCYTES NFR BLD: 11.7 % (ref 18–48)
Lab: NORMAL
MAGNESIUM SERPL-MCNC: 1.9 MG/DL (ref 1.6–2.6)
MCH RBC QN AUTO: 27.7 PG (ref 27–31)
MCHC RBC AUTO-ENTMCNC: 29.9 G/DL (ref 32–36)
MCV RBC AUTO: 93 FL (ref 82–98)
MONOCYTES # BLD AUTO: 1.3 K/UL (ref 0.3–1)
MONOCYTES NFR BLD: 7.4 % (ref 4–15)
NEUTROPHILS # BLD AUTO: 13.4 K/UL (ref 1.8–7.7)
NEUTROPHILS NFR BLD: 76.1 % (ref 38–73)
NRBC BLD-RTO: 0 /100 WBC
PHOSPHATE SERPL-MCNC: 2.5 MG/DL (ref 2.7–4.5)
PLATELET # BLD AUTO: 558 K/UL (ref 150–350)
PMV BLD AUTO: 9.4 FL (ref 9.2–12.9)
POTASSIUM SERPL-SCNC: 3.6 MMOL/L (ref 3.5–5.1)
RBC # BLD AUTO: 3.11 M/UL (ref 4.6–6.2)
SATURATED IRON: 24 % (ref 20–50)
SODIUM SERPL-SCNC: 147 MMOL/L (ref 136–145)
SUPPLEMENTAL DIAGNOSIS: NORMAL
TOTAL IRON BINDING CAPACITY: 107 UG/DL (ref 250–450)
TRANSFERRIN SERPL-MCNC: 72 MG/DL (ref 200–375)
VIT B12 SERPL-MCNC: 533 PG/ML (ref 210–950)
WBC # BLD AUTO: 17.55 K/UL (ref 3.9–12.7)

## 2020-12-20 PROCEDURE — 63600175 PHARM REV CODE 636 W HCPCS: Performed by: INTERNAL MEDICINE

## 2020-12-20 PROCEDURE — 25000003 PHARM REV CODE 250: Performed by: INTERNAL MEDICINE

## 2020-12-20 PROCEDURE — 94761 N-INVAS EAR/PLS OXIMETRY MLT: CPT

## 2020-12-20 PROCEDURE — 83735 ASSAY OF MAGNESIUM: CPT

## 2020-12-20 PROCEDURE — 25000242 PHARM REV CODE 250 ALT 637 W/ HCPCS: Performed by: INTERNAL MEDICINE

## 2020-12-20 PROCEDURE — 83540 ASSAY OF IRON: CPT

## 2020-12-20 PROCEDURE — 25000003 PHARM REV CODE 250: Performed by: NURSE PRACTITIONER

## 2020-12-20 PROCEDURE — 82607 VITAMIN B-12: CPT

## 2020-12-20 PROCEDURE — 99900026 HC AIRWAY MAINTENANCE (STAT)

## 2020-12-20 PROCEDURE — 85025 COMPLETE CBC W/AUTO DIFF WBC: CPT

## 2020-12-20 PROCEDURE — 21400001 HC TELEMETRY ROOM

## 2020-12-20 PROCEDURE — 82746 ASSAY OF FOLIC ACID SERUM: CPT

## 2020-12-20 PROCEDURE — 80048 BASIC METABOLIC PNL TOTAL CA: CPT

## 2020-12-20 PROCEDURE — 94640 AIRWAY INHALATION TREATMENT: CPT

## 2020-12-20 PROCEDURE — 99900035 HC TECH TIME PER 15 MIN (STAT)

## 2020-12-20 PROCEDURE — 84100 ASSAY OF PHOSPHORUS: CPT

## 2020-12-20 RX ORDER — ACETAMINOPHEN 650 MG/1
650 SUPPOSITORY RECTAL EVERY 6 HOURS PRN
Status: DISCONTINUED | OUTPATIENT
Start: 2020-12-20 | End: 2020-12-20

## 2020-12-20 RX ORDER — ACETAMINOPHEN 325 MG/1
650 TABLET ORAL EVERY 6 HOURS PRN
Status: DISCONTINUED | OUTPATIENT
Start: 2020-12-20 | End: 2020-12-22 | Stop reason: HOSPADM

## 2020-12-20 RX ADMIN — LEVALBUTEROL HYDROCHLORIDE 0.63 MG: 0.63 SOLUTION RESPIRATORY (INHALATION) at 04:12

## 2020-12-20 RX ADMIN — AMPICILLIN SODIUM AND SULBACTAM SODIUM 3 G: 2; 1 INJECTION, POWDER, FOR SOLUTION INTRAMUSCULAR; INTRAVENOUS at 04:12

## 2020-12-20 RX ADMIN — ACETAMINOPHEN 650 MG: 325 TABLET ORAL at 09:12

## 2020-12-20 RX ADMIN — MICONAZOLE NITRATE: 2 OINTMENT TOPICAL at 09:12

## 2020-12-20 RX ADMIN — FUROSEMIDE 40 MG: 10 INJECTION, SOLUTION INTRAMUSCULAR; INTRAVENOUS at 08:12

## 2020-12-20 RX ADMIN — SUCRALFATE 1 G: 1 SUSPENSION ORAL at 07:12

## 2020-12-20 RX ADMIN — AMPICILLIN SODIUM AND SULBACTAM SODIUM 3 G: 2; 1 INJECTION, POWDER, FOR SOLUTION INTRAMUSCULAR; INTRAVENOUS at 08:12

## 2020-12-20 RX ADMIN — DILTIAZEM HYDROCHLORIDE 30 MG: 30 TABLET, FILM COATED ORAL at 02:12

## 2020-12-20 RX ADMIN — SULFAMETHOXAZOLE AND TRIMETHOPRIM 1 TABLET: 800; 160 TABLET ORAL at 08:12

## 2020-12-20 RX ADMIN — LEVALBUTEROL HYDROCHLORIDE 0.63 MG: 0.63 SOLUTION RESPIRATORY (INHALATION) at 07:12

## 2020-12-20 RX ADMIN — PANTOPRAZOLE SODIUM 40 MG: 40 GRANULE, DELAYED RELEASE ORAL at 09:12

## 2020-12-20 RX ADMIN — AMPICILLIN SODIUM AND SULBACTAM SODIUM 3 G: 2; 1 INJECTION, POWDER, FOR SOLUTION INTRAMUSCULAR; INTRAVENOUS at 01:12

## 2020-12-20 RX ADMIN — SUCRALFATE 1 G: 1 SUSPENSION ORAL at 06:12

## 2020-12-20 RX ADMIN — SUCRALFATE 1 G: 1 SUSPENSION ORAL at 12:12

## 2020-12-20 RX ADMIN — METOPROLOL TARTRATE 50 MG: 50 TABLET, FILM COATED ORAL at 09:12

## 2020-12-20 RX ADMIN — LEVALBUTEROL HYDROCHLORIDE 0.63 MG: 0.63 SOLUTION RESPIRATORY (INHALATION) at 12:12

## 2020-12-20 RX ADMIN — AMPICILLIN SODIUM AND SULBACTAM SODIUM 3 G: 2; 1 INJECTION, POWDER, FOR SOLUTION INTRAMUSCULAR; INTRAVENOUS at 11:12

## 2020-12-20 RX ADMIN — PANTOPRAZOLE SODIUM 40 MG: 40 GRANULE, DELAYED RELEASE ORAL at 08:12

## 2020-12-20 RX ADMIN — AMPICILLIN SODIUM AND SULBACTAM SODIUM 3 G: 2; 1 INJECTION, POWDER, FOR SOLUTION INTRAMUSCULAR; INTRAVENOUS at 07:12

## 2020-12-20 RX ADMIN — METOPROLOL TARTRATE 50 MG: 50 TABLET, FILM COATED ORAL at 08:12

## 2020-12-20 RX ADMIN — DILTIAZEM HYDROCHLORIDE 30 MG: 30 TABLET, FILM COATED ORAL at 06:12

## 2020-12-20 RX ADMIN — AMPICILLIN SODIUM AND SULBACTAM SODIUM 3 G: 2; 1 INJECTION, POWDER, FOR SOLUTION INTRAMUSCULAR; INTRAVENOUS at 12:12

## 2020-12-20 RX ADMIN — MINOCYCLINE HYDROCHLORIDE 100 MG: 50 CAPSULE ORAL at 09:12

## 2020-12-20 RX ADMIN — MINOCYCLINE HYDROCHLORIDE 100 MG: 50 CAPSULE ORAL at 08:12

## 2020-12-20 RX ADMIN — SUCRALFATE 1 G: 1 SUSPENSION ORAL at 01:12

## 2020-12-20 RX ADMIN — SULFAMETHOXAZOLE AND TRIMETHOPRIM 1 TABLET: 800; 160 TABLET ORAL at 09:12

## 2020-12-20 RX ADMIN — DILTIAZEM HYDROCHLORIDE 30 MG: 30 TABLET, FILM COATED ORAL at 09:12

## 2020-12-20 RX ADMIN — PHENOBARBITAL SODIUM 100.1 MG: 65 INJECTION INTRAMUSCULAR; INTRAVENOUS at 09:12

## 2020-12-20 NOTE — PROGRESS NOTES
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 Ortez on 12/08/2020 due to colostomy prolapse) due to bilateral sacral decubitus ulcers. He is a resident of [facility] where he was sent from today for reports of tachycardia, coffee ground emesis,  and dark/black stool noted coming from his colostomy. His stool for occult blood is positive and he has had a noted two  point drop in his hgb and hct  from 3 days ago. His BP has been stable.  Patient is being placed in hospital for further evaluation and treatment including GI and Surgery consult, Iv Abx for sepsis, and close monitoring of his GI bleed.     Patient seen in ER.    I had a long discussion with Mother, Leni Moscoso, [phone], at bedside and updated her on patient's status. Patient is extremely debilitated with multiple comorbidities. Mother is the decision maker and states patient is a Full Code. I explained to her patient's guarded prognosis and she  "wants everything done".      Overview/Hospital Course:  12/14:  Patient overall stable overnight however continues to be tachycardic.  Range of between 01/20/2040.  His alkaline phosphatase has trended upwards.  Appreciate gastroenterology recommendations.  No evidence of active bleeding at this time per PEG colostomy.  Getting CT abdomen and pelvis with contrast for further " insight into alkaline phosphatase and GGT elevation.  Discussed case Dr. Ortez and GI Dr. Bautista. Consulted palliative care to discuss goals of care. Discussed with Amanda Colmenares (palliative) and mother does not want to consider what to do if his condition worsens    12/15:  Patient's condition continues being guarded.  Appreciate infectious disease recs.  Zyvox and fluconazole was stopped.  His CRP and ESR are high.  Following cultures.  Continues to be on Levaquin and Unasyn.  CT angiogram yesterday ruled out pulmonary embolism and however confirmed atelectasis some pulmonary effusion in the setting postoperative status.  Unfortunately does not have lot options for positive pressure as he cannot follow directions to use incentive spirometry and not able to get up and move around to mobilize fluid.  Discussed case with mother.  Continues to have elevated phosphatase.  No structural reason this imaging.  Will be going for upper endoscopy today.    12/16:  No significant change over night.  Patient does appear to be more alert and moving his eyes looking around today.  Mom has no new concerns. WBC stable from yesterday.  Heart rate appears to be similar stable around 130. Discussed with Infectious Disease who continues to follow patient and is watching for culture results.  No changes to the antibiotic therapy at this time.    12/17:  Heart rate has trended down over the last 24 hr which is reassuring.  However, since yesterday his oxygen requirement has increased.  Pending repeat chest x-ray this morning.  Sodium elevated and potassium low this morning.  Replacing potassium through G-tube and rechecking sodium after increasing fluid rate.  He is only been getting 50 mL/hr.  Increasing this to 100 mL per hour and will repeat sodium potassium level 2:00 p.m. this afternoon.  Unable to discuss with mother as she was not present in the room today.  Will round later to speak with her.    12/18- Remains on O2 10L/min  via  trach collar . No acute distress noted .No tachypnea.  Remains tachycardiac . Add Diltiazem per G tube along with Metoprolol. Tube feed held per Pulmonology and Lasix IV initiated . Repeat CXR in am to evaluate Left lung atelectasis/collapse.Consult RT for tracheal suctioning.  Hypernatremia , hypokalemia and hypomagnesemia again noted. Will replete accordingly . Gentle hydration continues while on Lasix for kathia pleural effusion. Current antibiotic - Unasyn and Levofloxacin.     12/19- Follow up CXR resulted marked improvement in pulmonary aeration in the left lung with near resolution of atelectasis . O2 down to 8L/min. ST on monitor . Increase Metoprolol to 50 mg bid . Resume tube feeds today . Wound vac to kathia ischium placed yesterday.  Antibiotic changed to Unasyn, Minocycline  and Bactrim per ID. Possible DC in 24 to 48h     12/20- Persistent sinus tachycardia but slightly better. Meds adjusted. Started tube feeds and tolerating . Stool is yellow and no signs of active bleed. H/H stable at 8.3/28. Remains on O2 8 to 10L/min via trach collar. Repeat CXR resulted near resolution of left lung atelectasis. Continue trach care and tracheal suctioning per RT.  Continue Antibiotic - Unasyn, Minocycline and Bactrim per ID recs. Pt is medically stable to return to LTAC.     Interval History:   Persistent sinus tachycardia but slightly better. Meds adjusted. Started tube feeds and tolerating . Stool is yellow and no signs of active bleed. H/H stable at 8.3/28. Remains on O2 8 to 10L/min via trach collar. Repeat CXR resulted near resolution of left lung atelectasis. Continue trach care and tracheal suctioning per RT.  Continue Antibiotic - Unasyn, Minocycline and Bactrim per ID recs. Pt is medically stable to return to LTAC.         Review of Systems   Unable to perform ROS: Patient nonverbal     Objective:     Vital Signs (Most Recent):  Temp: 97.9 °F (36.6 °C) (12/20/20 1134)  Pulse: (!) 116 (12/20/20 1134)  Resp:  18 (12/20/20 1134)  BP: (!) 141/89 (12/20/20 1134)  SpO2: 97 % (12/20/20 1134) Vital Signs (24h Range):  Temp:  [97.1 °F (36.2 °C)-98.5 °F (36.9 °C)] 97.9 °F (36.6 °C)  Pulse:  [105-134] 116  Resp:  [16-22] 18  SpO2:  [83 %-97 %] 97 %  BP: (113-141)/(70-89) 141/89     Weight: 47 kg (103 lb 9.9 oz)  Body mass index is 24.98 kg/m².    Intake/Output Summary (Last 24 hours) at 12/20/2020 1309  Last data filed at 12/20/2020 0600  Gross per 24 hour   Intake 1144 ml   Output 2600 ml   Net -1456 ml      Physical Exam  Constitutional:       General: He is not in acute distress.     Appearance: He is well-developed. He is not diaphoretic.      Comments: Awake , non verbal    HENT:      Head: Normocephalic and atraumatic.      Mouth/Throat:      Pharynx: No oropharyngeal exudate.   Eyes:      Conjunctiva/sclera: Conjunctivae normal.      Pupils: Pupils are equal, round, and reactive to light.   Neck:      Musculoskeletal: Neck supple.      Thyroid: No thyromegaly.      Vascular: No JVD.      Comments: Trach in place   Cardiovascular:      Rate and Rhythm: Regular rhythm. Tachycardia present.      Heart sounds: Normal heart sounds. No murmur.   Pulmonary:      Effort: Pulmonary effort is normal. No respiratory distress.      Breath sounds: Normal breath sounds. No wheezing or rales.      Comments: Trach collar . Coarse breath sounds kathia   Chest:      Chest wall: No tenderness.   Abdominal:      General: Bowel sounds are normal. There is no distension.      Palpations: Abdomen is soft.      Tenderness: There is no abdominal tenderness. There is no guarding or rebound.      Comments: PEG and colostomy in place    Lymphadenopathy:      Cervical: No cervical adenopathy.   Skin:     Findings: No rash.      Comments: Wound vac in place kathia ischium    Neurological:      Comments: Awake , non verbal          Significant Labs:   CBC:   Recent Labs   Lab 12/19/20  0556 12/20/20  0551   WBC 14.02* 17.55*   HGB 8.3* 8.6*   HCT 27.8* 28.8*    * 558*     CMP:   Recent Labs   Lab 12/18/20  1825 12/19/20  0556 12/20/20  0551   * 148* 147*   K 4.1 3.7 3.6   * 117* 116*   CO2 24 24 24   GLU 78 100 95   BUN 7 6 7   CREATININE 0.7 0.7 0.7   CALCIUM 7.1* 7.2* 7.3*   PROT  --  5.2*  --    ALBUMIN  --  1.3*  --    BILITOT  --  0.1  --    ALKPHOS  --  589*  --    AST  --  19  --    ALT  --  12  --    ANIONGAP 9 7* 7*   EGFRNONAA >60 >60 >60       Significant Imaging:       Assessment/Plan:      * Sepsis  This patient does have evidence of infective focus  My overall impression is sepsis. Vital signs were reviewed and noted in progress note.  Antibiotics given-   Antibiotics (From admission, onward)    Start     Stop Route Frequency Ordered    12/19/20 0900  sulfamethoxazole-trimethoprim 800-160mg per tablet 1 tablet      01/09 0859 Oral 2 times daily 12/19/20 0511    12/19/20 0900  minocycline capsule 100 mg      01/09 0859 Oral Every 12 hours 12/19/20 0511    12/19/20 0830  ampicillin-sulbactam 3 g in sodium chloride 0.9 % 100 mL IVPB (ready to mix system)      01/04 0829 IV Every 4 hours (non-standard times) 12/19/20 0530        Cultures were taken-   Microbiology Results (last 7 days)     Procedure Component Value Units Date/Time    Culture, Respiratory with Gram Stain [[id number]]  (Abnormal) Collected: 12/17/20 1701    Order Status: Completed Specimen: Respiratory from Endotracheal Aspirate Updated: 12/20/20 1037     Respiratory Culture ESCHERICHIA COLI  Few  Susceptibility pending  Normal respiratory jose also present       Gram Stain (Respiratory) <10 epithelial cells per low power field.     Gram Stain (Respiratory) Few WBC's     Gram Stain (Respiratory) Rare Gram negative rods    Aerobic culture [[id number]]  (Abnormal)  (Susceptibility) Collected: 12/14/20 1208    Order Status: Completed Specimen: Wound from Buttocks, Right Updated: 12/19/20 1306     Aerobic Bacterial Culture STENOTROPHOMONAS (X.) MALTOPHILIA  Moderate         ACINETOBACTER BAUMANNII   Many      Narrative:      Right ischium stage 4 pressure injury    Blood culture x two cultures. Draw prior to antibiotics. [[id number]] Collected: 12/13/20 1536    Order Status: Completed Specimen: Blood from Peripheral, Antecubital, Left Updated: 12/19/20 0612     Blood Culture, Routine No growth after 5 days.    Narrative:      Aerobic and anaerobic    Blood culture x two cultures. Draw prior to antibiotics. [[id number]] Collected: 12/13/20 1530    Order Status: Completed Specimen: Blood from Peripheral, Antecubital, Left Updated: 12/19/20 0612     Blood Culture, Routine No growth after 5 days.    Narrative:      Aerobic and anaerobic    Culture, Respiratory with Gram Stain [[id number]]  (Abnormal)  (Susceptibility) Collected: 12/13/20 1855    Order Status: Completed Specimen: Respiratory from Sputum Updated: 12/16/20 1127     Respiratory Culture No S aureus or Pseudomonas isolated.      SERRATIA MARCESCENS  Moderate       Gram Stain (Respiratory) <10 epithelial cells per low power field.     Gram Stain (Respiratory) Few yeast     Gram Stain (Respiratory) Rare Gram negative rods     Gram Stain (Respiratory) Few WBC's        Latest lactate reviewed, they are-  No results for input(s): LACTATE in the last 72 hours.  Source- Suspected GI    Source control Achieved by- Iv Abx  Appreciate ID recs. Seems to be stabilizing        Gastrointestinal hemorrhage  Hemoglobin with minimal change at this time (near baseline).  GI recs after EGD  1. IV Protonix BID  Then switch to via PEG BIX x 2 months  2. Carafate 1g suspension via PEG x 2 weeks  3. Restart tube feeds  4. Loosen external bumper to 3.5cm from skin    Atelectasis, left Lung with hypoxia   In post-op period. Frequent repositioning. Cannot do Incentive spirometry  Pulmonology consult obtained . Diuresis suggested for kathia pleural effusion   Consult RT for tracheal suctioning   Continue supp O2.   Repeat CXR in AM   12/19-   Follow up CXR  resulted marked improvement in pulmonary aeration in the left lung with near resolution of atelectasis . O2 down to 8L/min.     Status post colostomy  S/p recent Colostomy placement on 10/29/2020 followed by Revision due to Colostomy prolapse on 12/08/2020. Atelectasis in post-op period      Status post tracheostomy  12/18- Consult RT for tracheal suctioning due to left lung atelectasis       Sinus tachycardia  Secondary to underlying condition/atelectasis.  Continue BB- increase metoprolol to 50 mg bid   Add CCB     Electrolyte abnormality  12/18-  Hypernatremia , Hypokalemia , Hypomagnesemia noted   Correct abnormalities as indicated        Bilateral pleural effusion  12/18-  Diuretic initiated       Candiduria  ID advised no tx. Iglesias removed.       Elevated alkaline phosphatase level  Improving significantly    Abnormal EKG  Cardiology felt no ischemic etiology and artifact likely       Sacral decubitus ulcer  Appreciate wound care assistance. 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 Recent cx negative for this      Hypertensive urgency  Monitor  Telemetry          Severe malnutrition  Appreciate dietary recs. Realized today that dietary not on case. Consult placed and getting him back on enteral nutrition. This will help potassium balance      Seizure disorder  Will give home dose phenobarbital Iv- Discussed with pharmacy      Aspiration pneumonia  12/18-  Pulmonology held tube feed for now   12/19-  Tube feeds resume today   Continue antibiotic as recommended by ID.       Cerebral palsy  Underlying complicating factor to current medical course.  He is at  baseline is nonverbal and minimally responsive.  Discussion with mom and also have consulted palliative care.  Continues to be full code.        VTE Risk Mitigation (From admission, onward)         Ordered     IP VTE HIGH RISK PATIENT  Once      12/13/20 1726     Place SHANELLE hose  Until discontinued      12/13/20 1658                Discharge Planning   ROCÍO:      Code Status: Full Code   Is the patient medically ready for discharge?:     Reason for patient still in hospital (select all that apply): Pending disposition  Discharge Plan A: Long-term acute care facility (LTAC)                  Travis Bobo MD  Department of Hospital Medicine   [facility]

## 2020-12-20 NOTE — PROGRESS NOTES
"[facility] Hospital Medicine  Progress Note    Patient Name: Glen Moscoso  MRN: [id number]  Patient Class: IP- Inpatient   Admission Date: 12/13/2020  Length of Stay: 6 days  Attending Physician: Travis Bobo MD  Primary Care Provider: Yadi Lawson MD        Subjective:     Principal Problem:Sepsis  Acute Condition: Aspiration pneumonia , Lung atelectasis , hypoxia         HPI:  Per ER report- This is a 24 yo male with PMHx of cerebal palsy, nonverbal, chronic resp failure, trach dependent, chronic anemia, peg tube placement, and prior colostomy placement for fecal diversion due to 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 Ortez on 12/08/2020 due to colostomy prolapse) due to bilateral sacral decubitus ulcers. He is a resident of [facility] where he was sent from today for reports of tachycardia, coffee ground emesis,  and dark/black stool noted coming from his colostomy. His stool for occult blood is positive and he has had a noted two  point drop in his hgb and hct  from 3 days ago. His BP has been stable.  Patient is being placed in hospital for further evaluation and treatment including GI and Surgery consult, Iv Abx for sepsis, and close monitoring of his GI bleed.     Patient seen in ER.    I had a long discussion with Mother, Leni Moscoso, [phone], at bedside and updated her on patient's status. Patient is extremely debilitated with multiple comorbidities. Mother is the decision maker and states patient is a Full Code. I explained to her patient's guarded prognosis and she  "wants everything done".      Overview/Hospital Course:  12/14:  Patient overall stable overnight however continues to be tachycardic.  Range of between 01/20/2040.  His alkaline phosphatase has trended upwards.  Appreciate gastroenterology recommendations.  No evidence of active bleeding at this time per PEG colostomy.  Getting " CT abdomen and pelvis with contrast for further insight into alkaline phosphatase and GGT elevation.  Discussed case Dr. Ortez and GI Dr. Bautista. Consulted palliative care to discuss goals of care. Discussed with Amanda Colmenares (palliative) and mother does not want to consider what to do if his condition worsens    12/15:  Patient's condition continues being guarded.  Appreciate infectious disease recs.  Zyvox and fluconazole was stopped.  His CRP and ESR are high.  Following cultures.  Continues to be on Levaquin and Unasyn.  CT angiogram yesterday ruled out pulmonary embolism and however confirmed atelectasis some pulmonary effusion in the setting postoperative status.  Unfortunately does not have lot options for positive pressure as he cannot follow directions to use incentive spirometry and not able to get up and move around to mobilize fluid.  Discussed case with mother.  Continues to have elevated phosphatase.  No structural reason this imaging.  Will be going for upper endoscopy today.    12/16:  No significant change over night.  Patient does appear to be more alert and moving his eyes looking around today.  Mom has no new concerns. WBC stable from yesterday.  Heart rate appears to be similar stable around 130. Discussed with Infectious Disease who continues to follow patient and is watching for culture results.  No changes to the antibiotic therapy at this time.    12/17:  Heart rate has trended down over the last 24 hr which is reassuring.  However, since yesterday his oxygen requirement has increased.  Pending repeat chest x-ray this morning.  Sodium elevated and potassium low this morning.  Replacing potassium through G-tube and rechecking sodium after increasing fluid rate.  He is only been getting 50 mL/hr.  Increasing this to 100 mL per hour and will repeat sodium potassium level 2:00 p.m. this afternoon.  Unable to discuss with mother as she was not present in the room today.  Will round later to  speak with her.    12/18- Remains on O2 10L/min via  trach collar . No acute distress noted .No tachypnea.  Remains tachycardiac . Add Diltiazem per G tube along with Metoprolol. Tube feed held per Pulmonology and Lasix IV initiated . Repeat CXR in am to evaluate Left lung atelectasis/collapse.Consult RT for tracheal suctioning.  Hypernatremia , hypokalemia and hypomagnesemia again noted. Will replete accordingly . Gentle hydration continues while on Lasix for kathia pleural effusion. Current antibiotic - Unasyn and Levofloxacin.     12/19- Follow up CXR resulted marked improvement in pulmonary aeration in the left lung with near resolution of atelectasis . O2 down to 8L/min. ST on monitor . Increase Metoprolol to 50 mg bid . Resume tube feeds today . Wound vac to kathia ischium placed yesterday.  Antibiotic changed to Unasyn, Minocycline  and Bactrim per ID. Possible DC in 24 to 48h     Interval History:   Follow up CXR resulted marked improvement in pulmonary aeration in the left lung with near resolution of atelectasis . O2 down to 8L/min. ST on monitor . Increase Metoprolol to 50 mg bid. Resume tube feeds today . Wound vac to kathia ischium placed yesterday.  Antibiotic changed to Unasyn, Minocycline  and Bactrim per ID. Possible DC in 24 to 48h         Review of Systems   Unable to perform ROS: Patient nonverbal     Objective:     Vital Signs (Most Recent):  Temp: 98.5 °F (36.9 °C) (12/19/20 1557)  Pulse: (!) 122 (12/19/20 1700)  Resp: 18 (12/19/20 1557)  BP: 120/80 (12/19/20 1557)  SpO2: (!) 92 % (12/19/20 1557) Vital Signs (24h Range):  Temp:  [98.5 °F (36.9 °C)-98.9 °F (37.2 °C)] 98.5 °F (36.9 °C)  Pulse:  [103-131] 122  Resp:  [16-22] 18  SpO2:  [92 %-97 %] 92 %  BP: (112-183)/(68-99) 120/80     Weight: 42 kg (92 lb 9.5 oz)  Body mass index is 22.33 kg/m².    Intake/Output Summary (Last 24 hours) at 12/19/2020 1757  Last data filed at 12/19/2020 1714  Gross per 24 hour   Intake 334 ml   Output 3755 ml   Net -3421 ml       Physical Exam  Constitutional:       General: He is not in acute distress.     Appearance: He is well-developed. He is not diaphoretic.      Comments: Awake , non verbal    HENT:      Head: Normocephalic and atraumatic.      Mouth/Throat:      Pharynx: No oropharyngeal exudate.   Eyes:      Conjunctiva/sclera: Conjunctivae normal.      Pupils: Pupils are equal, round, and reactive to light.   Neck:      Musculoskeletal: Neck supple.      Thyroid: No thyromegaly.      Vascular: No JVD.   Cardiovascular:      Rate and Rhythm: Regular rhythm. Tachycardia present.      Heart sounds: Normal heart sounds. No murmur.   Pulmonary:      Effort: Pulmonary effort is normal. No respiratory distress.      Breath sounds: No wheezing or rales.      Comments: Trach in place. Coarse breath sounds kathia   Chest:      Chest wall: No tenderness.   Abdominal:      General: Bowel sounds are normal. There is distension (mild).      Palpations: Abdomen is soft.      Tenderness: There is no abdominal tenderness. There is no guarding or rebound.      Comments: PEG tube and colostomy in place   Musculoskeletal:      Comments: U/L ext contracture    Lymphadenopathy:      Cervical: No cervical adenopathy.   Skin:     General: Skin is warm and dry.      Comments: Wound vac in place    Neurological:      Comments: Awake , non verbal          Significant Labs:   CBC:   Recent Labs   Lab 12/19/20  0556   WBC 14.02*   HGB 8.3*   HCT 27.8*   *     CMP:   Recent Labs   Lab 12/18/20  0950 12/18/20  1825 12/19/20  0556   * 151* 148*   K 2.3* 4.1 3.7   * 118* 117*   CO2 25 24 24   GLU 83 78 100   BUN 8 7 6   CREATININE 0.7 0.7 0.7   CALCIUM 7.1* 7.1* 7.2*   PROT  --   --  5.2*   ALBUMIN  --   --  1.3*   BILITOT  --   --  0.1   ALKPHOS  --   --  589*   AST  --   --  19   ALT  --   --  12   ANIONGAP 12 9 7*   EGFRNONAA >60 >60 >60       Significant Imaging:       Assessment/Plan:      * Sepsis  This patient does have evidence of  infective focus  My overall impression is sepsis. Vital signs were reviewed and noted in progress note.  Antibiotics given-   Antibiotics (From admission, onward)    Start     Stop Route Frequency Ordered    12/19/20 0900  sulfamethoxazole-trimethoprim 800-160mg per tablet 1 tablet      01/09 0859 Oral 2 times daily 12/19/20 0511    12/19/20 0900  minocycline capsule 100 mg      01/09 0859 Oral Every 12 hours 12/19/20 0511    12/19/20 0830  ampicillin-sulbactam 3 g in sodium chloride 0.9 % 100 mL IVPB (ready to mix system)      01/04 0829 IV Every 4 hours (non-standard times) 12/19/20 0530        Cultures were taken-   Microbiology Results (last 7 days)     Procedure Component Value Units Date/Time    Culture, Respiratory with Gram Stain [[id number]]  (Abnormal) Collected: 12/17/20 1701    Order Status: Completed Specimen: Respiratory from Endotracheal Aspirate Updated: 12/19/20 1402     Respiratory Culture ESCHERICHIA COLI  Few  Susceptibility pending  Normal respiratory jose also present       Gram Stain (Respiratory) <10 epithelial cells per low power field.     Gram Stain (Respiratory) Few WBC's     Gram Stain (Respiratory) Rare Gram negative rods    Aerobic culture [[id number]]  (Abnormal)  (Susceptibility) Collected: 12/14/20 1208    Order Status: Completed Specimen: Wound from Buttocks, Right Updated: 12/19/20 1306     Aerobic Bacterial Culture STENOTROPHOMONAS (X.) MALTOPHILIA  Moderate        ACINETOBACTER BAUMANNII   Many      Narrative:      Right ischium stage 4 pressure injury    Blood culture x two cultures. Draw prior to antibiotics. [[id number]] Collected: 12/13/20 1536    Order Status: Completed Specimen: Blood from Peripheral, Antecubital, Left Updated: 12/19/20 0612     Blood Culture, Routine No growth after 5 days.    Narrative:      Aerobic and anaerobic    Blood culture x two cultures. Draw prior to antibiotics. [[id number]] Collected: 12/13/20 1530    Order Status: Completed Specimen: Blood  from Peripheral, Antecubital, Left Updated: 12/19/20 0612     Blood Culture, Routine No growth after 5 days.    Narrative:      Aerobic and anaerobic    Culture, Respiratory with Gram Stain [[id number]]  (Abnormal)  (Susceptibility) Collected: 12/13/20 8146    Order Status: Completed Specimen: Respiratory from Sputum Updated: 12/16/20 1127     Respiratory Culture No S aureus or Pseudomonas isolated.      SERRATIA MARCESCENS  Moderate       Gram Stain (Respiratory) <10 epithelial cells per low power field.     Gram Stain (Respiratory) Few yeast     Gram Stain (Respiratory) Rare Gram negative rods     Gram Stain (Respiratory) Few WBC's        Latest lactate reviewed, they are-  No results for input(s): LACTATE in the last 72 hours.  Source- Suspected GI    Source control Achieved by- Iv Abx  Appreciate ID recs. Seems to be stabilizing        Atelectasis, left Lung with hypoxia   In post-op period. Frequent repositioning. Cannot do Incentive spirometry  Pulmonology consult obtained . Diuresis suggested for kathia pleural effusion   Consult RT for tracheal suctioning   Continue supp O2.   Repeat CXR in AM   12/19-   Follow up CXR resulted marked improvement in pulmonary aeration in the left lung with near resolution of atelectasis . O2 down to 8L/min.     Status post colostomy  S/p recent Colostomy placement on 10/29/2020 followed by Revision due to Colostomy prolapse on 12/08/2020. Atelectasis in post-op period      Status post tracheostomy  12/18- Consult RT for tracheal suctioning due to left lung atelectasis       Electrolyte abnormality  12/18-  Hypernatremia , Hypokalemia , Hypomagnesemia noted   Correct abnormalities as indicated        Bilateral pleural effusion  12/18-  Diuretic initiated       Candiduria  ID advised no tx. Iglesias removed.       Elevated alkaline phosphatase level  Improving significantly    Abnormal EKG  Cardiology felt no ischemic etiology and artifact likely       Sacral decubitus  ulcer  Appreciate wound care assistance. 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 Recent cx negative for this      Hypertensive urgency  Monitor  Telemetry          Gastrointestinal hemorrhage  Hemoglobin with minimal change at this time (near baseline).  GI recs after EGD  1. IV Protonix BID  Then switch to via PEG BIX x 2 months  2. Carafate 1g suspension via PEG x 2 weeks  3. Restart tube feeds  4. Loosen external bumper to 3.5cm from skin    Severe malnutrition  Appreciate dietary recs. Realized today that dietary not on case. Consult placed and getting him back on enteral nutrition. This will help potassium balance      Seizure disorder  Will give home dose phenobarbital Iv- Discussed with pharmacy      Aspiration pneumonia  12/18-  Pulmonology held tube feed for now   12/19-  Tube feeds resume today   Continue antibiotic as recommended by ID.       Sinus tachycardia  Secondary to underlying condition/atelectasis.  Continue BB- increase metoprolol to 50 mg bid   Add CCB     Cerebral palsy  Underlying complicating factor to current medical course.  He is at baseline is nonverbal and minimally responsive.  Discussion with mom and also have consulted palliative care.  Continues to be full code.        VTE Risk Mitigation (From admission, onward)         Ordered     IP VTE HIGH RISK PATIENT  Once      12/13/20 1726     Place SHANELLE hose  Until discontinued      12/13/20 1726     Place SHANELLE hose  Until discontinued      12/13/20 1658                Discharge Planning   ROCÍO:      Code Status: Full Code   Is the patient medically ready for discharge?:     Reason for  patient still in hospital (select all that apply): Patient trending condition  Discharge Plan A: Long-term acute care facility (LTAC)                  Travis Bobo MD  Department of Hospital Medicine   [facility]

## 2020-12-20 NOTE — PLAN OF CARE
Pt alert and non-verbal  Pt remained afebrile throughout this shift.   All meds administered per order.   Pt remained free of falls this shift.    Plan of care reviewed. No evidence of learning   Pt turned q2h  Patient ST on monitor.   Bed low, side rails up x 2, wheels locked, call light in reach.   Patient instructed to call for assistance.   Hourly rounding completed.   Will continue to monitor

## 2020-12-20 NOTE — SUBJECTIVE & OBJECTIVE
Interval History:   Persistent sinus tachycardia but slightly better. Meds adjusted. Started tube feeds and tolerating . Stool is yellow and no signs of active bleed. H/H stable at 8.3/28. Remains on O2 8 to 10L/min via trach collar. Repeat CXR resulted near resolution of left lung atelectasis. Continue trach care and tracheal suctioning per RT.  Continue Antibiotic - Unasyn, Minocycline and Bactrim per ID recs. Pt is medically stable to return to LTAC.         Review of Systems   Unable to perform ROS: Patient nonverbal     Objective:     Vital Signs (Most Recent):  Temp: 97.9 °F (36.6 °C) (12/20/20 1134)  Pulse: (!) 116 (12/20/20 1134)  Resp: 18 (12/20/20 1134)  BP: (!) 141/89 (12/20/20 1134)  SpO2: 97 % (12/20/20 1134) Vital Signs (24h Range):  Temp:  [97.1 °F (36.2 °C)-98.5 °F (36.9 °C)] 97.9 °F (36.6 °C)  Pulse:  [105-134] 116  Resp:  [16-22] 18  SpO2:  [83 %-97 %] 97 %  BP: (113-141)/(70-89) 141/89     Weight: 47 kg (103 lb 9.9 oz)  Body mass index is 24.98 kg/m².    Intake/Output Summary (Last 24 hours) at 12/20/2020 1309  Last data filed at 12/20/2020 0600  Gross per 24 hour   Intake 1144 ml   Output 2600 ml   Net -1456 ml      Physical Exam  Constitutional:       General: He is not in acute distress.     Appearance: He is well-developed. He is not diaphoretic.      Comments: Awake , non verbal    HENT:      Head: Normocephalic and atraumatic.      Mouth/Throat:      Pharynx: No oropharyngeal exudate.   Eyes:      Conjunctiva/sclera: Conjunctivae normal.      Pupils: Pupils are equal, round, and reactive to light.   Neck:      Musculoskeletal: Neck supple.      Thyroid: No thyromegaly.      Vascular: No JVD.      Comments: Trach in place   Cardiovascular:      Rate and Rhythm: Regular rhythm. Tachycardia present.      Heart sounds: Normal heart sounds. No murmur.   Pulmonary:      Effort: Pulmonary effort is normal. No respiratory distress.      Breath sounds: Normal breath sounds. No wheezing or rales.       Comments: Trach collar . Coarse breath sounds kathia   Chest:      Chest wall: No tenderness.   Abdominal:      General: Bowel sounds are normal. There is no distension.      Palpations: Abdomen is soft.      Tenderness: There is no abdominal tenderness. There is no guarding or rebound.      Comments: PEG and colostomy in place    Lymphadenopathy:      Cervical: No cervical adenopathy.   Skin:     Findings: No rash.      Comments: Wound vac in place kathia ischium    Neurological:      Comments: Awake , non verbal          Significant Labs:   CBC:   Recent Labs   Lab 12/19/20  0556 12/20/20  0551   WBC 14.02* 17.55*   HGB 8.3* 8.6*   HCT 27.8* 28.8*   * 558*     CMP:   Recent Labs   Lab 12/18/20  1825 12/19/20  0556 12/20/20  0551   * 148* 147*   K 4.1 3.7 3.6   * 117* 116*   CO2 24 24 24   GLU 78 100 95   BUN 7 6 7   CREATININE 0.7 0.7 0.7   CALCIUM 7.1* 7.2* 7.3*   PROT  --  5.2*  --    ALBUMIN  --  1.3*  --    BILITOT  --  0.1  --    ALKPHOS  --  589*  --    AST  --  19  --    ALT  --  12  --    ANIONGAP 9 7* 7*   EGFRNONAA >60 >60 >60       Significant Imaging:

## 2020-12-20 NOTE — ASSESSMENT & PLAN NOTE
Secondary to underlying condition/atelectasis.  Continue BB- increase metoprolol to 50 mg bid   Add CCB

## 2020-12-20 NOTE — ASSESSMENT & PLAN NOTE
In post-op period. Frequent repositioning. Cannot do Incentive spirometry  Pulmonology consult obtained . Diuresis suggested for kathia pleural effusion   Consult RT for tracheal suctioning   Continue supp O2.   Repeat CXR in AM   12/19-   Follow up CXR resulted marked improvement in pulmonary aeration in the left lung with near resolution of atelectasis . O2 down to 8L/min.

## 2020-12-20 NOTE — ASSESSMENT & PLAN NOTE
This patient does have evidence of infective focus  My overall impression is sepsis. Vital signs were reviewed and noted in progress note.  Antibiotics given-   Antibiotics (From admission, onward)    Start     Stop Route Frequency Ordered    12/19/20 0900  sulfamethoxazole-trimethoprim 800-160mg per tablet 1 tablet      01/09 0859 Oral 2 times daily 12/19/20 0511    12/19/20 0900  minocycline capsule 100 mg      01/09 0859 Oral Every 12 hours 12/19/20 0511    12/19/20 0830  ampicillin-sulbactam 3 g in sodium chloride 0.9 % 100 mL IVPB (ready to mix system)      01/04 0829 IV Every 4 hours (non-standard times) 12/19/20 0530        Cultures were taken-   Microbiology Results (last 7 days)     Procedure Component Value Units Date/Time    Culture, Respiratory with Gram Stain [[id number]]  (Abnormal) Collected: 12/17/20 1701    Order Status: Completed Specimen: Respiratory from Endotracheal Aspirate Updated: 12/19/20 1402     Respiratory Culture ESCHERICHIA COLI  Few  Susceptibility pending  Normal respiratory jose also present       Gram Stain (Respiratory) <10 epithelial cells per low power field.     Gram Stain (Respiratory) Few WBC's     Gram Stain (Respiratory) Rare Gram negative rods    Aerobic culture [[id number]]  (Abnormal)  (Susceptibility) Collected: 12/14/20 1208    Order Status: Completed Specimen: Wound from Buttocks, Right Updated: 12/19/20 1306     Aerobic Bacterial Culture STENOTROPHOMONAS (X.) MALTOPHILIA  Moderate        ACINETOBACTER BAUMANNII   Many      Narrative:      Right ischium stage 4 pressure injury    Blood culture x two cultures. Draw prior to antibiotics. [[id number]] Collected: 12/13/20 1536    Order Status: Completed Specimen: Blood from Peripheral, Antecubital, Left Updated: 12/19/20 0612     Blood Culture, Routine No growth after 5 days.    Narrative:      Aerobic and anaerobic    Blood culture x two cultures. Draw prior to antibiotics. [[id number]] Collected: 12/13/20 1530    Order  Status: Completed Specimen: Blood from Peripheral, Antecubital, Left Updated: 12/19/20 0612     Blood Culture, Routine No growth after 5 days.    Narrative:      Aerobic and anaerobic    Culture, Respiratory with Gram Stain [[id number]]  (Abnormal)  (Susceptibility) Collected: 12/13/20 5377    Order Status: Completed Specimen: Respiratory from Sputum Updated: 12/16/20 1127     Respiratory Culture No S aureus or Pseudomonas isolated.      SERRATIA MARCESCENS  Moderate       Gram Stain (Respiratory) <10 epithelial cells per low power field.     Gram Stain (Respiratory) Few yeast     Gram Stain (Respiratory) Rare Gram negative rods     Gram Stain (Respiratory) Few WBC's        Latest lactate reviewed, they are-  No results for input(s): LACTATE in the last 72 hours.  Source- Suspected GI    Source control Achieved by- Iv Abx  Appreciate ID recs. Seems to be stabilizing

## 2020-12-20 NOTE — PLAN OF CARE
FELISHA spoke with Sharita ([phone]) from [facility] who reported that since patient have been in the hospital for a week, he will be a re admission to their facility.  Therefore, he will need to get re authorization from his insurance.  Sharita reported that Case Management can call [phone] tomorrow to set up his admission once insurance have been obtained.     Cassy Luna LMSW 12/20/2020 10:14 AM

## 2020-12-20 NOTE — PLAN OF CARE
Tolerating feedings  Problem: Nutrition Impaired (Sepsis/Septic Shock)  Goal: Optimal Nutrition Intake  Outcome: Ongoing, Progressing

## 2020-12-20 NOTE — SIGNIFICANT EVENT
Patient Deterioration Alert     Deterioration alert received.  Patient seen and examined, in NAD.  VS unchanged.  Labs reviewed.  No acute change in patient status.  Continue current treatment plan.        Donya Mckeon, NP

## 2020-12-20 NOTE — ASSESSMENT & PLAN NOTE
12/18-  Pulmonology held tube feed for now   12/19-  Tube feeds resume today   Continue antibiotic as recommended by ID.

## 2020-12-20 NOTE — ASSESSMENT & PLAN NOTE
This patient does have evidence of infective focus  My overall impression is sepsis. Vital signs were reviewed and noted in progress note.  Antibiotics given-   Antibiotics (From admission, onward)    Start     Stop Route Frequency Ordered    12/19/20 0900  sulfamethoxazole-trimethoprim 800-160mg per tablet 1 tablet      01/09 0859 Oral 2 times daily 12/19/20 0511    12/19/20 0900  minocycline capsule 100 mg      01/09 0859 Oral Every 12 hours 12/19/20 0511    12/19/20 0830  ampicillin-sulbactam 3 g in sodium chloride 0.9 % 100 mL IVPB (ready to mix system)      01/04 0829 IV Every 4 hours (non-standard times) 12/19/20 0530        Cultures were taken-   Microbiology Results (last 7 days)     Procedure Component Value Units Date/Time    Culture, Respiratory with Gram Stain [[id number]]  (Abnormal) Collected: 12/17/20 1701    Order Status: Completed Specimen: Respiratory from Endotracheal Aspirate Updated: 12/20/20 1037     Respiratory Culture ESCHERICHIA COLI  Few  Susceptibility pending  Normal respiratory jose also present       Gram Stain (Respiratory) <10 epithelial cells per low power field.     Gram Stain (Respiratory) Few WBC's     Gram Stain (Respiratory) Rare Gram negative rods    Aerobic culture [[id number]]  (Abnormal)  (Susceptibility) Collected: 12/14/20 1208    Order Status: Completed Specimen: Wound from Buttocks, Right Updated: 12/19/20 1306     Aerobic Bacterial Culture STENOTROPHOMONAS (X.) MALTOPHILIA  Moderate        ACINETOBACTER BAUMANNII   Many      Narrative:      Right ischium stage 4 pressure injury    Blood culture x two cultures. Draw prior to antibiotics. [[id number]] Collected: 12/13/20 1536    Order Status: Completed Specimen: Blood from Peripheral, Antecubital, Left Updated: 12/19/20 0612     Blood Culture, Routine No growth after 5 days.    Narrative:      Aerobic and anaerobic    Blood culture x two cultures. Draw prior to antibiotics. [[id number]] Collected: 12/13/20 1530    Order  Status: Completed Specimen: Blood from Peripheral, Antecubital, Left Updated: 12/19/20 0612     Blood Culture, Routine No growth after 5 days.    Narrative:      Aerobic and anaerobic    Culture, Respiratory with Gram Stain [[id number]]  (Abnormal)  (Susceptibility) Collected: 12/13/20 3260    Order Status: Completed Specimen: Respiratory from Sputum Updated: 12/16/20 1127     Respiratory Culture No S aureus or Pseudomonas isolated.      SERRATIA MARCESCENS  Moderate       Gram Stain (Respiratory) <10 epithelial cells per low power field.     Gram Stain (Respiratory) Few yeast     Gram Stain (Respiratory) Rare Gram negative rods     Gram Stain (Respiratory) Few WBC's        Latest lactate reviewed, they are-  No results for input(s): LACTATE in the last 72 hours.  Source- Suspected GI    Source control Achieved by- Iv Abx  Appreciate ID recs. Seems to be stabilizing

## 2020-12-21 LAB
ANION GAP SERPL CALC-SCNC: 9 MMOL/L (ref 8–16)
BASOPHILS # BLD AUTO: 0.04 K/UL (ref 0–0.2)
BASOPHILS NFR BLD: 0.3 % (ref 0–1.9)
BUN SERPL-MCNC: 14 MG/DL (ref 6–20)
CALCIUM SERPL-MCNC: 7.2 MG/DL (ref 8.7–10.5)
CHLORIDE SERPL-SCNC: 114 MMOL/L (ref 95–110)
CO2 SERPL-SCNC: 23 MMOL/L (ref 23–29)
CREAT SERPL-MCNC: 0.7 MG/DL (ref 0.5–1.4)
DIFFERENTIAL METHOD: ABNORMAL
EOSINOPHIL # BLD AUTO: 0.6 K/UL (ref 0–0.5)
EOSINOPHIL NFR BLD: 3.9 % (ref 0–8)
ERYTHROCYTE [DISTWIDTH] IN BLOOD BY AUTOMATED COUNT: 16 % (ref 11.5–14.5)
EST. GFR  (AFRICAN AMERICAN): >60 ML/MIN/1.73 M^2
EST. GFR  (NON AFRICAN AMERICAN): >60 ML/MIN/1.73 M^2
GLUCOSE SERPL-MCNC: 85 MG/DL (ref 70–110)
HCT VFR BLD AUTO: 26.2 % (ref 40–54)
HGB BLD-MCNC: 7.9 G/DL (ref 14–18)
IMM GRANULOCYTES # BLD AUTO: 0.15 K/UL (ref 0–0.04)
IMM GRANULOCYTES NFR BLD AUTO: 1 % (ref 0–0.5)
LYMPHOCYTES # BLD AUTO: 2.5 K/UL (ref 1–4.8)
LYMPHOCYTES NFR BLD: 16.2 % (ref 18–48)
MCH RBC QN AUTO: 27.8 PG (ref 27–31)
MCHC RBC AUTO-ENTMCNC: 30.2 G/DL (ref 32–36)
MCV RBC AUTO: 92 FL (ref 82–98)
MONOCYTES # BLD AUTO: 1.1 K/UL (ref 0.3–1)
MONOCYTES NFR BLD: 7.1 % (ref 4–15)
NEUTROPHILS # BLD AUTO: 11.3 K/UL (ref 1.8–7.7)
NEUTROPHILS NFR BLD: 71.5 % (ref 38–73)
NRBC BLD-RTO: 0 /100 WBC
PLATELET # BLD AUTO: 571 K/UL (ref 150–350)
PMV BLD AUTO: 9.6 FL (ref 9.2–12.9)
POCT GLUCOSE: 75 MG/DL (ref 70–110)
POTASSIUM SERPL-SCNC: 3.8 MMOL/L (ref 3.5–5.1)
RBC # BLD AUTO: 2.84 M/UL (ref 4.6–6.2)
SODIUM SERPL-SCNC: 146 MMOL/L (ref 136–145)
WBC # BLD AUTO: 15.7 K/UL (ref 3.9–12.7)

## 2020-12-21 PROCEDURE — 63600175 PHARM REV CODE 636 W HCPCS: Performed by: INTERNAL MEDICINE

## 2020-12-21 PROCEDURE — 25000242 PHARM REV CODE 250 ALT 637 W/ HCPCS: Performed by: INTERNAL MEDICINE

## 2020-12-21 PROCEDURE — 99900035 HC TECH TIME PER 15 MIN (STAT)

## 2020-12-21 PROCEDURE — 27000221 HC OXYGEN, UP TO 24 HOURS

## 2020-12-21 PROCEDURE — 94640 AIRWAY INHALATION TREATMENT: CPT

## 2020-12-21 PROCEDURE — 87040 BLOOD CULTURE FOR BACTERIA: CPT

## 2020-12-21 PROCEDURE — 25000003 PHARM REV CODE 250: Performed by: INTERNAL MEDICINE

## 2020-12-21 PROCEDURE — 94761 N-INVAS EAR/PLS OXIMETRY MLT: CPT

## 2020-12-21 PROCEDURE — 36415 COLL VENOUS BLD VENIPUNCTURE: CPT

## 2020-12-21 PROCEDURE — 99900026 HC AIRWAY MAINTENANCE (STAT)

## 2020-12-21 PROCEDURE — 21400001 HC TELEMETRY ROOM

## 2020-12-21 PROCEDURE — 25000003 PHARM REV CODE 250: Performed by: NURSE PRACTITIONER

## 2020-12-21 PROCEDURE — 27100171 HC OXYGEN HIGH FLOW UP TO 24 HOURS

## 2020-12-21 PROCEDURE — 80048 BASIC METABOLIC PNL TOTAL CA: CPT

## 2020-12-21 PROCEDURE — 85025 COMPLETE CBC W/AUTO DIFF WBC: CPT

## 2020-12-21 RX ADMIN — FUROSEMIDE 40 MG: 10 INJECTION, SOLUTION INTRAMUSCULAR; INTRAVENOUS at 09:12

## 2020-12-21 RX ADMIN — DILTIAZEM HYDROCHLORIDE 30 MG: 30 TABLET, FILM COATED ORAL at 05:12

## 2020-12-21 RX ADMIN — AMPICILLIN SODIUM AND SULBACTAM SODIUM 3 G: 2; 1 INJECTION, POWDER, FOR SOLUTION INTRAMUSCULAR; INTRAVENOUS at 10:12

## 2020-12-21 RX ADMIN — SUCRALFATE 1 G: 1 SUSPENSION ORAL at 06:12

## 2020-12-21 RX ADMIN — LEVALBUTEROL HYDROCHLORIDE 0.63 MG: 0.63 SOLUTION RESPIRATORY (INHALATION) at 04:12

## 2020-12-21 RX ADMIN — SULFAMETHOXAZOLE AND TRIMETHOPRIM 1 TABLET: 800; 160 TABLET ORAL at 09:12

## 2020-12-21 RX ADMIN — AMPICILLIN SODIUM AND SULBACTAM SODIUM 3 G: 2; 1 INJECTION, POWDER, FOR SOLUTION INTRAMUSCULAR; INTRAVENOUS at 03:12

## 2020-12-21 RX ADMIN — METOPROLOL TARTRATE 50 MG: 50 TABLET, FILM COATED ORAL at 09:12

## 2020-12-21 RX ADMIN — DILTIAZEM HYDROCHLORIDE 30 MG: 30 TABLET, FILM COATED ORAL at 09:12

## 2020-12-21 RX ADMIN — MINOCYCLINE HYDROCHLORIDE 100 MG: 50 CAPSULE ORAL at 09:12

## 2020-12-21 RX ADMIN — SUCRALFATE 1 G: 1 SUSPENSION ORAL at 11:12

## 2020-12-21 RX ADMIN — MICONAZOLE NITRATE: 2 OINTMENT TOPICAL at 09:12

## 2020-12-21 RX ADMIN — DILTIAZEM HYDROCHLORIDE 30 MG: 30 TABLET, FILM COATED ORAL at 02:12

## 2020-12-21 RX ADMIN — LEVALBUTEROL HYDROCHLORIDE 0.63 MG: 0.63 SOLUTION RESPIRATORY (INHALATION) at 12:12

## 2020-12-21 RX ADMIN — SUCRALFATE 1 G: 1 SUSPENSION ORAL at 05:12

## 2020-12-21 RX ADMIN — PANTOPRAZOLE SODIUM 40 MG: 40 GRANULE, DELAYED RELEASE ORAL at 09:12

## 2020-12-21 RX ADMIN — AMPICILLIN SODIUM AND SULBACTAM SODIUM 3 G: 2; 1 INJECTION, POWDER, FOR SOLUTION INTRAMUSCULAR; INTRAVENOUS at 06:12

## 2020-12-21 RX ADMIN — PHENOBARBITAL SODIUM 100.1 MG: 65 INJECTION INTRAMUSCULAR; INTRAVENOUS at 09:12

## 2020-12-21 RX ADMIN — LEVALBUTEROL HYDROCHLORIDE 0.63 MG: 0.63 SOLUTION RESPIRATORY (INHALATION) at 07:12

## 2020-12-21 RX ADMIN — AMPICILLIN SODIUM AND SULBACTAM SODIUM 3 G: 2; 1 INJECTION, POWDER, FOR SOLUTION INTRAMUSCULAR; INTRAVENOUS at 11:12

## 2020-12-21 RX ADMIN — SUCRALFATE 1 G: 1 SUSPENSION ORAL at 01:12

## 2020-12-21 RX ADMIN — IRON SUCROSE 200 MG: 20 INJECTION, SOLUTION INTRAVENOUS at 06:12

## 2020-12-21 NOTE — PLAN OF CARE
Cm met with patient's mother at the bedside to update on plan.  Mother expressed to CM that she is not ready for hospice at this time.  She stated that the doctor discussed hospice with her this morning.  CM informed mother that the plan is [facility] at this time.         12/21/20 1349   Discharge Reassessment   Assessment Type Discharge Planning Reassessment   Provided patient/caregiver education on the expected discharge date and the discharge plan Yes   Do you have any problems affording any of your prescribed medications? No   Discharge Plan A Long-term acute care facility (LTAC)   Discharge Plan B Long-term acute care facility (LTAC)   DME Needed Upon Discharge  none   Patient choice form signed by patient/caregiver N/A   Anticipated Discharge Disposition LTAC   Can the patient/caregiver answer the patient profile reliably? Yes, cognitively intact

## 2020-12-21 NOTE — PROGRESS NOTES
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 Ortez on 12/08/2020 due to colostomy prolapse) due to bilateral sacral decubitus ulcers. He is a resident of [facility] where he was sent from today for reports of tachycardia, coffee ground emesis,  and dark/black stool noted coming from his colostomy. His stool for occult blood is positive and he has had a noted two  point drop in his hgb and hct  from 3 days ago. His BP has been stable.  Patient is being placed in hospital for further evaluation and treatment including GI and Surgery consult, Iv Abx for sepsis, and close monitoring of his GI bleed.     Patient seen in ER.    I had a long discussion with Mother, Leni Moscoso, [phone], at bedside and updated her on patient's status. Patient is extremely debilitated with multiple comorbidities. Mother is the decision maker and states patient is a Full Code. I explained to her patient's guarded prognosis and she  "wants everything done".      Overview/Hospital Course:  12/14:  Patient overall stable overnight however continues to be tachycardic.  Range of between 01/20/2040.  His alkaline phosphatase has trended upwards.  Appreciate gastroenterology recommendations.  No evidence of active bleeding at this time per PEG colostomy.  Getting CT abdomen and pelvis with contrast for further " insight into alkaline phosphatase and GGT elevation.  Discussed case Dr. Ortez and GI Dr. Bautista. Consulted palliative care to discuss goals of care. Discussed with Amanda Colmenares (palliative) and mother does not want to consider what to do if his condition worsens    12/15:  Patient's condition continues being guarded.  Appreciate infectious disease recs.  Zyvox and fluconazole was stopped.  His CRP and ESR are high.  Following cultures.  Continues to be on Levaquin and Unasyn.  CT angiogram yesterday ruled out pulmonary embolism and however confirmed atelectasis some pulmonary effusion in the setting postoperative status.  Unfortunately does not have lot options for positive pressure as he cannot follow directions to use incentive spirometry and not able to get up and move around to mobilize fluid.  Discussed case with mother.  Continues to have elevated phosphatase.  No structural reason this imaging.  Will be going for upper endoscopy today.    12/16:  No significant change over night.  Patient does appear to be more alert and moving his eyes looking around today.  Mom has no new concerns. WBC stable from yesterday.  Heart rate appears to be similar stable around 130. Discussed with Infectious Disease who continues to follow patient and is watching for culture results.  No changes to the antibiotic therapy at this time.    12/17:  Heart rate has trended down over the last 24 hr which is reassuring.  However, since yesterday his oxygen requirement has increased.  Pending repeat chest x-ray this morning.  Sodium elevated and potassium low this morning.  Replacing potassium through G-tube and rechecking sodium after increasing fluid rate.  He is only been getting 50 mL/hr.  Increasing this to 100 mL per hour and will repeat sodium potassium level 2:00 p.m. this afternoon.  Unable to discuss with mother as she was not present in the room today.  Will round later to speak with her.    12/18- Remains on O2 10L/min  via  trach collar . No acute distress noted .No tachypnea.  Remains tachycardiac . Add Diltiazem per G tube along with Metoprolol. Tube feed held per Pulmonology and Lasix IV initiated . Repeat CXR in am to evaluate Left lung atelectasis/collapse.Consult RT for tracheal suctioning.  Hypernatremia , hypokalemia and hypomagnesemia again noted. Will replete accordingly . Gentle hydration continues while on Lasix for kathia pleural effusion. Current antibiotic - Unasyn and Levofloxacin.     12/19- Follow up CXR resulted marked improvement in pulmonary aeration in the left lung with near resolution of atelectasis . O2 down to 8L/min. ST on monitor . Increase Metoprolol to 50 mg bid . Resume tube feeds today . Wound vac to kathia ischium placed yesterday.  Antibiotic changed to Unasyn, Minocycline  and Bactrim per ID. Possible DC in 24 to 48h     12/20- Persistent sinus tachycardia but slightly better. Meds adjusted. Started tube feeds and tolerating . Stool is yellow and no signs of active bleed. H/H stable at 8.3/28. Remains on O2 8 to 10L/min via trach collar. Repeat CXR resulted near resolution of left lung atelectasis. Continue trach care and tracheal suctioning per RT.  Continue Antibiotic - Unasyn, Minocycline and Bactrim per ID recs. Pt is medically stable to return to LTAC.     12/21- Noted fever 102 last night . Blood cultures ordered. No further fever noted since . Continue current antibiotic . Slight drop in H/H noted . Will monitor. Will give iron sucrose injection. WBC mildly elevated.  Disposition- [facility]     Interval History:   Noted fever 102 last night . Blood cultures ordered. No further fever noted since . Continue current antibiotic . Slight drop in H/H noted . Will monitor. Will give iron sucrose injection. WBC mildly elevated.  Disposition- [facility]         Review of Systems   Unable to perform ROS: Patient nonverbal     Objective:     Vital Signs (Most Recent):  Temp: 99.4 °F (37.4 °C) (12/21/20  1536)  Pulse: (!) 119 (12/21/20 1634)  Resp: 20 (12/21/20 1634)  BP: (!) 155/77 (12/21/20 1555)  SpO2: 95 % (12/21/20 1634) Vital Signs (24h Range):  Temp:  [98.2 °F (36.8 °C)-102.3 °F (39.1 °C)] 99.4 °F (37.4 °C)  Pulse:  [] 119  Resp:  [18-22] 20  SpO2:  [92 %-100 %] 95 %  BP: (118-158)/() 155/77     Weight: 47 kg (103 lb 9.9 oz)  Body mass index is 24.98 kg/m².    Intake/Output Summary (Last 24 hours) at 12/21/2020 1706  Last data filed at 12/21/2020 1507  Gross per 24 hour   Intake 1995 ml   Output 2225 ml   Net -230 ml      Physical Exam  Constitutional:       General: He is not in acute distress.     Appearance: He is well-developed. He is not diaphoretic.      Comments: Awake . Non verbal    HENT:      Head: Normocephalic and atraumatic.      Mouth/Throat:      Pharynx: No oropharyngeal exudate.   Eyes:      Conjunctiva/sclera: Conjunctivae normal.      Pupils: Pupils are equal, round, and reactive to light.   Neck:      Musculoskeletal: Neck supple.      Thyroid: No thyromegaly.      Vascular: No JVD.      Comments: Trach in place   Cardiovascular:      Rate and Rhythm: Regular rhythm. Tachycardia present.      Heart sounds: Normal heart sounds. No murmur.   Pulmonary:      Effort: Pulmonary effort is normal. No respiratory distress.      Breath sounds: No wheezing or rales.      Comments: Coarse breath sounds kathia   Chest:      Chest wall: No tenderness.   Abdominal:      General: Bowel sounds are normal. There is no distension.      Palpations: Abdomen is soft.      Tenderness: There is no abdominal tenderness. There is no guarding or rebound.      Comments: Colostomy in place    Lymphadenopathy:      Cervical: No cervical adenopathy.   Skin:     Comments: Wound vac to kathia ischium in place    Neurological:      Comments: Awake , nonverbal          Significant Labs:   CBC:   Recent Labs   Lab 12/20/20  0551 12/21/20  1000   WBC 17.55* 15.70*   HGB 8.6* 7.9*   HCT 28.8* 26.2*   * 571*      CMP:   Recent Labs   Lab 12/20/20  0551 12/21/20  1000   * 146*   K 3.6 3.8   * 114*   CO2 24 23   GLU 95 85   BUN 7 14   CREATININE 0.7 0.7   CALCIUM 7.3* 7.2*   ANIONGAP 7* 9   EGFRNONAA >60 >60       Significant Imaging:       Assessment/Plan:      * Sepsis  This patient does have evidence of infective focus  My overall impression is sepsis. Vital signs were reviewed and noted in progress note.  Antibiotics given-   Antibiotics (From admission, onward)    Start     Stop Route Frequency Ordered    12/19/20 0900  sulfamethoxazole-trimethoprim 800-160mg per tablet 1 tablet      01/09 0859 Oral 2 times daily 12/19/20 0511    12/19/20 0900  minocycline capsule 100 mg      01/09 0859 Oral Every 12 hours 12/19/20 0511    12/19/20 0830  ampicillin-sulbactam 3 g in sodium chloride 0.9 % 100 mL IVPB (ready to mix system)      01/04 1114 IV Every 4 hours (non-standard times) 12/19/20 0530        Cultures were taken-   Microbiology Results (last 7 days)     Procedure Component Value Units Date/Time    Blood culture [[id number]] Collected: 12/21/20 0041    Order Status: Sent Specimen: Blood Updated: 12/21/20 1241    Blood culture [[id number]] Collected: 12/21/20 0041    Order Status: Sent Specimen: Blood Updated: 12/21/20 1241    Culture, Respiratory with Gram Stain [[id number]]  (Abnormal) Collected: 12/17/20 1701    Order Status: Completed Specimen: Respiratory from Endotracheal Aspirate Updated: 12/21/20 1007     Respiratory Culture No S aureus or Pseudomonas isolated.      ESCHERICHIA COLI  Few  Susceptibility pending  Normal respiratory jose also present       Gram Stain (Respiratory) <10 epithelial cells per low power field.     Gram Stain (Respiratory) Few WBC's     Gram Stain (Respiratory) Rare Gram negative rods    Aerobic culture [[id number]]  (Abnormal)  (Susceptibility) Collected: 12/14/20 1208    Order Status: Completed Specimen: Wound from Buttocks, Right Updated: 12/19/20 1306     Aerobic Bacterial  Culture STENOTROPHOMONAS (X.) MALTOPHILIA  Moderate        ACINETOBACTER BAUMANNII   Many      Narrative:      Right ischium stage 4 pressure injury    Blood culture x two cultures. Draw prior to antibiotics. [[id number]] Collected: 12/13/20 1536    Order Status: Completed Specimen: Blood from Peripheral, Antecubital, Left Updated: 12/19/20 0612     Blood Culture, Routine No growth after 5 days.    Narrative:      Aerobic and anaerobic    Blood culture x two cultures. Draw prior to antibiotics. [[id number]] Collected: 12/13/20 1530    Order Status: Completed Specimen: Blood from Peripheral, Antecubital, Left Updated: 12/19/20 0612     Blood Culture, Routine No growth after 5 days.    Narrative:      Aerobic and anaerobic    Culture, Respiratory with Gram Stain [[id number]]  (Abnormal)  (Susceptibility) Collected: 12/13/20 1855    Order Status: Completed Specimen: Respiratory from Sputum Updated: 12/16/20 1127     Respiratory Culture No S aureus or Pseudomonas isolated.      SERRATIA MARCESCENS  Moderate       Gram Stain (Respiratory) <10 epithelial cells per low power field.     Gram Stain (Respiratory) Few yeast     Gram Stain (Respiratory) Rare Gram negative rods     Gram Stain (Respiratory) Few WBC's        Latest lactate reviewed, they are-  No results for input(s): LACTATE in the last 72 hours.  Source- Suspected GI    Source control Achieved by- Iv Abx  Appreciate ID recs. Seems to be stabilizing        Gastrointestinal hemorrhage  Hemoglobin with minimal change at this time (near baseline).  GI recs after EGD  1. IV Protonix BID  Then switch to via PEG BIX x 2 months  2. Carafate 1g suspension via PEG x 2 weeks  3. Restart tube feeds  4. Loosen external bumper to 3.5cm from skin    Atelectasis, left Lung with hypoxia   In post-op period. Frequent repositioning. Cannot do Incentive spirometry  Pulmonology consult obtained . Diuresis suggested for kathia pleural effusion   Consult RT for tracheal suctioning    Continue supp O2.   Repeat CXR in AM   12/19-   Follow up CXR resulted marked improvement in pulmonary aeration in the left lung with near resolution of atelectasis . O2 down to 8L/min.     Status post colostomy  S/p recent Colostomy placement on 10/29/2020 followed by Revision due to Colostomy prolapse on 12/08/2020. Atelectasis in post-op period      Status post tracheostomy  12/18- Consult RT for tracheal suctioning due to left lung atelectasis       Sinus tachycardia  Secondary to underlying condition/atelectasis.  Continue BB- increase metoprolol to 50 mg bid   Add CCB     Electrolyte abnormality  12/18-  Hypernatremia , Hypokalemia , Hypomagnesemia noted   Correct abnormalities as indicated        Bilateral pleural effusion  12/18-  Diuretic initiated       Candiduria  ID advised no tx. Iglesias removed.       Elevated alkaline phosphatase level  Improving significantly    Abnormal EKG  Cardiology felt no ischemic etiology and artifact likely       Sacral decubitus ulcer  Appreciate wound care assistance. 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 Recent cx negative for this      Hypertensive urgency  Monitor  Telemetry          Severe malnutrition  Appreciate dietary recs. Realized today that dietary not on case. Consult placed and getting him back on enteral nutrition. This will help potassium balance      Seizure disorder  Will give home dose phenobarbital Iv- Discussed with pharmacy      Aspiration pneumonia  12/18-  Pulmonology held tube feed for now   12/19-  Tube feeds resume today   Continue antibiotic as recommended by ID.       Cerebral  palsy  Underlying complicating factor to current medical course.  He is at baseline is nonverbal and minimally responsive.  Discussion with mom and also have consulted palliative care.  Continues to be full code.        VTE Risk Mitigation (From admission, onward)         Ordered     IP VTE HIGH RISK PATIENT  Once      12/13/20 1726     Place SHANELLE hose  Until discontinued      12/13/20 1658                Discharge Planning   ROCÍO:      Code Status: Full Code   Is the patient medically ready for discharge?:     Reason for patient still in hospital (select all that apply): Patient trending condition  Discharge Plan A: Long-term acute care facility (LTAC)                  Travis Bobo MD  Department of Hospital Medicine   [facility]

## 2020-12-21 NOTE — ASSESSMENT & PLAN NOTE
This patient does have evidence of infective focus  My overall impression is sepsis. Vital signs were reviewed and noted in progress note.  Antibiotics given-   Antibiotics (From admission, onward)    Start     Stop Route Frequency Ordered    12/19/20 0900  sulfamethoxazole-trimethoprim 800-160mg per tablet 1 tablet      01/09 0859 Oral 2 times daily 12/19/20 0511    12/19/20 0900  minocycline capsule 100 mg      01/09 0859 Oral Every 12 hours 12/19/20 0511    12/19/20 0830  ampicillin-sulbactam 3 g in sodium chloride 0.9 % 100 mL IVPB (ready to mix system)      01/04 1114 IV Every 4 hours (non-standard times) 12/19/20 0530        Cultures were taken-   Microbiology Results (last 7 days)     Procedure Component Value Units Date/Time    Blood culture [[id number]] Collected: 12/21/20 0041    Order Status: Sent Specimen: Blood Updated: 12/21/20 1241    Blood culture [[id number]] Collected: 12/21/20 0041    Order Status: Sent Specimen: Blood Updated: 12/21/20 1241    Culture, Respiratory with Gram Stain [[id number]]  (Abnormal) Collected: 12/17/20 1701    Order Status: Completed Specimen: Respiratory from Endotracheal Aspirate Updated: 12/21/20 1007     Respiratory Culture No S aureus or Pseudomonas isolated.      ESCHERICHIA COLI  Few  Susceptibility pending  Normal respiratory jose also present       Gram Stain (Respiratory) <10 epithelial cells per low power field.     Gram Stain (Respiratory) Few WBC's     Gram Stain (Respiratory) Rare Gram negative rods    Aerobic culture [[id number]]  (Abnormal)  (Susceptibility) Collected: 12/14/20 1208    Order Status: Completed Specimen: Wound from Buttocks, Right Updated: 12/19/20 1306     Aerobic Bacterial Culture STENOTROPHOMONAS (X.) MALTOPHILIA  Moderate        ACINETOBACTER BAUMANNII   Many      Narrative:      Right ischium stage 4 pressure injury    Blood culture x two cultures. Draw prior to antibiotics. [[id number]] Collected: 12/13/20 1536    Order Status: Completed  Specimen: Blood from Peripheral, Antecubital, Left Updated: 12/19/20 0612     Blood Culture, Routine No growth after 5 days.    Narrative:      Aerobic and anaerobic    Blood culture x two cultures. Draw prior to antibiotics. [[id number]] Collected: 12/13/20 1530    Order Status: Completed Specimen: Blood from Peripheral, Antecubital, Left Updated: 12/19/20 0612     Blood Culture, Routine No growth after 5 days.    Narrative:      Aerobic and anaerobic    Culture, Respiratory with Gram Stain [[id number]]  (Abnormal)  (Susceptibility) Collected: 12/13/20 1855    Order Status: Completed Specimen: Respiratory from Sputum Updated: 12/16/20 1127     Respiratory Culture No S aureus or Pseudomonas isolated.      SERRATIA MARCESCENS  Moderate       Gram Stain (Respiratory) <10 epithelial cells per low power field.     Gram Stain (Respiratory) Few yeast     Gram Stain (Respiratory) Rare Gram negative rods     Gram Stain (Respiratory) Few WBC's        Latest lactate reviewed, they are-  No results for input(s): LACTATE in the last 72 hours.  Source- Suspected GI    Source control Achieved by- Iv Abx  Appreciate ID recs. Seems to be stabilizing

## 2020-12-21 NOTE — PLAN OF CARE
CM sent a message via [employer] to check status of acceptance at [facility].  CM awaiting a response.

## 2020-12-21 NOTE — PLAN OF CARE
Pt alert and non-verbal  Pt with axillary temp of 102.3 at start of shift. Tylenol effective  All meds administered per order.   Pt remained free of falls this shift.    Tolerating tube feeds  Plan of care reviewed. No evidence of learning   Pt turned q2h  Patient ST on monitor.   Bed low, side rails up x 2, wheels locked, call light in reach.   Patient instructed to call for assistance.   Hourly rounding completed.   Will continue to monitor

## 2020-12-21 NOTE — SUBJECTIVE & OBJECTIVE
Interval History:   Noted fever 102 last night . Blood cultures ordered. No further fever noted since . Continue current antibiotic . Slight drop in H/H noted . Will monitor. Will give iron sucrose injection. WBC mildly elevated.  Disposition- [facility]         Review of Systems   Unable to perform ROS: Patient nonverbal     Objective:     Vital Signs (Most Recent):  Temp: 99.4 °F (37.4 °C) (12/21/20 1536)  Pulse: (!) 119 (12/21/20 1634)  Resp: 20 (12/21/20 1634)  BP: (!) 155/77 (12/21/20 1555)  SpO2: 95 % (12/21/20 1634) Vital Signs (24h Range):  Temp:  [98.2 °F (36.8 °C)-102.3 °F (39.1 °C)] 99.4 °F (37.4 °C)  Pulse:  [] 119  Resp:  [18-22] 20  SpO2:  [92 %-100 %] 95 %  BP: (118-158)/() 155/77     Weight: 47 kg (103 lb 9.9 oz)  Body mass index is 24.98 kg/m².    Intake/Output Summary (Last 24 hours) at 12/21/2020 1706  Last data filed at 12/21/2020 1507  Gross per 24 hour   Intake 1995 ml   Output 2225 ml   Net -230 ml      Physical Exam  Constitutional:       General: He is not in acute distress.     Appearance: He is well-developed. He is not diaphoretic.      Comments: Awake . Non verbal    HENT:      Head: Normocephalic and atraumatic.      Mouth/Throat:      Pharynx: No oropharyngeal exudate.   Eyes:      Conjunctiva/sclera: Conjunctivae normal.      Pupils: Pupils are equal, round, and reactive to light.   Neck:      Musculoskeletal: Neck supple.      Thyroid: No thyromegaly.      Vascular: No JVD.      Comments: Trach in place   Cardiovascular:      Rate and Rhythm: Regular rhythm. Tachycardia present.      Heart sounds: Normal heart sounds. No murmur.   Pulmonary:      Effort: Pulmonary effort is normal. No respiratory distress.      Breath sounds: No wheezing or rales.      Comments: Coarse breath sounds kathia   Chest:      Chest wall: No tenderness.   Abdominal:      General: Bowel sounds are normal. There is no distension.      Palpations: Abdomen is soft.      Tenderness: There is no abdominal  tenderness. There is no guarding or rebound.      Comments: Colostomy in place    Lymphadenopathy:      Cervical: No cervical adenopathy.   Skin:     Comments: Wound vac to kathia ischium in place    Neurological:      Comments: Awake , nonverbal          Significant Labs:   CBC:   Recent Labs   Lab 12/20/20  0551 12/21/20  1000   WBC 17.55* 15.70*   HGB 8.6* 7.9*   HCT 28.8* 26.2*   * 571*     CMP:   Recent Labs   Lab 12/20/20  0551 12/21/20  1000   * 146*   K 3.6 3.8   * 114*   CO2 24 23   GLU 95 85   BUN 7 14   CREATININE 0.7 0.7   CALCIUM 7.3* 7.2*   ANIONGAP 7* 9   EGFRNONAA >60 >60       Significant Imaging:

## 2020-12-21 NOTE — PROGRESS NOTES
12/21/20 0845   Skin   Skin WDL ex   Skin Color/Characteristics without discoloration   Skin Temperature warm   Skin Moisture dry   Skin Elasticity slow return to original state   Skin Integrity wound   Specialty Bed/Overlay Low air loss;Air fluidized   Bed Support Surface Assessed   Jerry Risk Assessment   Sensory Perception 1-->completely limited   Moisture 3-->occasionally moist   Activity 1-->bedfast   Mobility 1-->completely immobile   Nutrition 2-->probably inadequate   Friction and Shear 1-->problem   Jerry Score 9        Negative Pressure Wound Therapy  12/18/20    Placement Date: 12/18/20   Side: (c)   Location: Ischial tuberosity   NPWT Type Vacuum Therapy   Therapy Setting NPWT Continuous therapy   Pressure Setting NPWT 125 mmHg   Therapy Interventions NPWT Dressing changed;Y connector   Sponges Inserted NPWT Black;2   Sponges Removed NPWT Black;2        Altered Skin Integrity 10/23/20 Right Ischial tuberosity Full thickness tissue loss with exposed bone, tendon, or muscle. Often includes undermining and tunneling. May extend into muscle and/or supporting structures.   Date First Assessed: 10/23/20   Altered Skin Integrity Present on Admission: yes  Side: Right  Location: Ischial tuberosity  Description of Altered Skin Integrity: Full thickness tissue loss with exposed bone, tendon, or muscle. Often includes undermi...   Description of Altered Skin Integrity Full thickness tissue loss with exposed bone, tendon, or muscle. Often includes undermining and tunneling. May extend into muscle and/or supporting structures.   Dressing Appearance Intact;Moist drainage   Drainage Amount Moderate   Drainage Characteristics/Odor Serosanguineous   Appearance Red;Granulating;Bone;Moist   Tissue loss description Full thickness   Periwound Area Intact   Wound Edges Open   Wound Length (cm) 5 cm   Wound Width (cm) 6.5 cm   Wound Depth (cm) 1.5 cm   Wound Volume (cm^3) 48.75 cm^3   Wound Surface Area (cm^2) 32.5 cm^2    Undermining (depth (cm)/location) 3cm from 9-1 o'clock   Care Cleansed with:;Wound cleanser;Applied:;Skin Barrier   Dressing Changed   Dressing Change Due 12/24/20        Altered Skin Integrity 10/23/20 Left Ischial tuberosity Full thickness tissue loss with exposed bone, tendon, or muscle. Often includes undermining and tunneling. May extend into muscle and/or supporting structures.   Date First Assessed: 10/23/20   Altered Skin Integrity Present on Admission: yes  Side: Left  Location: Ischial tuberosity  Description of Altered Skin Integrity: Full thickness tissue loss with exposed bone, tendon, or muscle. Often includes undermin...   Description of Altered Skin Integrity Full thickness tissue loss with exposed bone, tendon, or muscle. Often includes undermining and tunneling. May extend into muscle and/or supporting structures.   Dressing Appearance Intact;Moist drainage   Drainage Amount Small   Drainage Characteristics/Odor Serosanguineous   Appearance Red;Granulating;Bone;Moist   Tissue loss description Full thickness   Periwound Area Intact   Wound Edges Open   Wound Length (cm) 4 cm   Wound Width (cm) 6 cm   Wound Depth (cm) 1.5 cm   Wound Volume (cm^3) 36 cm^3   Wound Surface Area (cm^2) 24 cm^2   Undermining (depth (cm)/location) 3cm from 9-3 o'clock   Care Cleansed with:;Sterile normal saline;Applied:;Skin Barrier   Dressing Changed   Dressing Change Due 12/24/20        Altered Skin Integrity 11/13/20 Scrotum Full thickness tissue loss. Subcutaneous fat may be visible but bone, tendon or muscle are not exposed   Date First Assessed: 11/13/20   Altered Skin Integrity Present on Admission: yes  Location: Scrotum  Description of Altered Skin Integrity: Full thickness tissue loss. Subcutaneous fat may be visible but bone, tendon or muscle are not exposed   Description of Altered Skin Integrity Full thickness tissue loss. Subcutaneous fat may be visible but bone, tendon or muscle are not exposed   Dressing  Appearance Intact   Drainage Amount Scant   Drainage Characteristics/Odor Serous   Appearance Red;Granulating;Moist   Tissue loss description Full thickness   Periwound Area Dry;Intact   Wound Edges Open   Wound Length (cm) 1 cm   Wound Width (cm) 1 cm   Wound Depth (cm) 0.1 cm   Wound Volume (cm^3) 0.1 cm^3   Wound Surface Area (cm^2) 1 cm^2   Care Cleansed with:;Sterile normal saline;Applied:;Skin Barrier   Dressing Hydrocolloid;Applied   Dressing Change Due 12/24/20        Altered Skin Integrity 12/14/20   Buttocks Moisture associated dermatitis   Date First Assessed: 12/14/20   Altered Skin Integrity Present on Admission: yes  Side: (c)   Orientation: (c)   Location: (c) Buttocks  Primary Wound Type: Moisture associated dermatitis   Dressing Appearance Intact   Drainage Amount Scant   Drainage Characteristics/Odor Serous   Appearance Red;Moist   Tissue loss description Partial thickness   Periwound Area Intact   Care Applied:;Skin Barrier   Skin Interventions   Pressure Reduction Devices specialty bed utilized;positioning supports utilized;heel offloading device utilized;foam padding utilized   Pressure Reduction Techniques frequent weight shift encouraged;heels elevated off bed;positioned off wounds   Skin Protection adhesive use limited;hydrocolloids used;incontinence pads utilized;silicone foam dressing in place;skin sealant/moisture barrier applied;tubing/devices free from skin contact     F/U visit with Mr. Moscoso for continued wound management. He is awake and alert, occasional grimace noted with movement.   LLQ colostomy noted with pouch intact, moderate amount thick dark green/brown liquid stool noted, emptied 125 mL at this time. PEG tube intact with gauze drain sponge to site, no rodrigo tube breakdown noted. Trach site assessed with resolution of erythema to rodrigo tube skin.  Improved/resolved fungal MASD noted, recommend discontinue use of Antifungal moisture barrier. Turned to right side with max  "assistance and positioned with pillows. Bilateral ischium dressings removed. 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 Moderate amount serosanguinous drainage noted bilaterally. Good improvement in rodrigo-wound skin condition noted. Cleansed all with vashe and patted dry. Rodrigo wound skin prepped using "crusting technique" with miconazole powder and cavilon spray. Edges lined with ostomy ring and window-paned with drape. One piece black foam cut to size and applied to fill each wound and sealed with drape. sensa trac pads applied to each wound and attached via Y-connecter to [facility] wound vac ULTA at continuous -125 mmHg low intensity suction with good seal. Patient then positioned on right side with wedge and pillow support, heels floated with towel rolls.  Posterior scrotum stage 3 pressure injury again noted, healing well , granulation tissue and epithelialization noted. Rodrigo wound blistering has resolved. Cleansed with saline, painted with cavilon, and duoderm applied to cover and protect from friction. Scrotum elevated with soft towel roll.  Recommend wound vac dressing change twice weekly. Dr. Bobo and primary nurse  Updated on care provided and plan of care. Will follow.           "

## 2020-12-21 NOTE — PLAN OF CARE
Patient has had no acute changes during shift. Alert. Nonverbal. Tolerating tube feedings. Abx therapy continuing. O2 sats stable. WV intact. Changed by WC nurse today. Iglesias intact. Increased BP, Tachy. Has not run a temp since prev shift. Will report to incoming nurse.

## 2020-12-22 VITALS
DIASTOLIC BLOOD PRESSURE: 73 MMHG | TEMPERATURE: 98 F | HEIGHT: 60 IN | RESPIRATION RATE: 18 BRPM | OXYGEN SATURATION: 95 % | HEART RATE: 101 BPM | SYSTOLIC BLOOD PRESSURE: 119 MMHG | WEIGHT: 94.81 LBS | BODY MASS INDEX: 18.62 KG/M2

## 2020-12-22 LAB
ABO + RH BLD: NORMAL
ALP BONE CFR SERPL: 42 % (ref 28–66)
ALP INTEST CFR SERPL: 0 % (ref 1–24)
ALP ISOS SERPL-IMP: ABNORMAL
ALP LIVER CFR SERPL: 58 % (ref 25–69)
ALP MACROHEPATIC CFR SERPL: ABNORMAL %
ALP PLAC CFR SERPL: 0 %
ALP SERPL-CCNC: 2264 U/L (ref 36–130)
ANION GAP SERPL CALC-SCNC: 7 MMOL/L (ref 8–16)
BACTERIA SPEC AEROBE CULT: ABNORMAL
BACTERIA SPEC AEROBE CULT: ABNORMAL
BASOPHILS # BLD AUTO: 0.04 K/UL (ref 0–0.2)
BASOPHILS NFR BLD: 0.3 % (ref 0–1.9)
BLD GP AB SCN CELLS X3 SERPL QL: NORMAL
BLD PROD TYP BPU: NORMAL
BLOOD UNIT EXPIRATION DATE: NORMAL
BLOOD UNIT TYPE CODE: 600
BLOOD UNIT TYPE: NORMAL
BUN SERPL-MCNC: 16 MG/DL (ref 6–20)
CALCIUM SERPL-MCNC: 7.6 MG/DL (ref 8.7–10.5)
CHLORIDE SERPL-SCNC: 116 MMOL/L (ref 95–110)
CO2 SERPL-SCNC: 27 MMOL/L (ref 23–29)
CODING SYSTEM: NORMAL
CREAT SERPL-MCNC: 0.7 MG/DL (ref 0.5–1.4)
DIFFERENTIAL METHOD: ABNORMAL
DISPENSE STATUS: NORMAL
EOSINOPHIL # BLD AUTO: 0.5 K/UL (ref 0–0.5)
EOSINOPHIL NFR BLD: 4.2 % (ref 0–8)
ERYTHROCYTE [DISTWIDTH] IN BLOOD BY AUTOMATED COUNT: 15.9 % (ref 11.5–14.5)
EST. GFR  (AFRICAN AMERICAN): >60 ML/MIN/1.73 M^2
EST. GFR  (NON AFRICAN AMERICAN): >60 ML/MIN/1.73 M^2
GLUCOSE SERPL-MCNC: 93 MG/DL (ref 70–110)
GRAM STN SPEC: ABNORMAL
HCT VFR BLD AUTO: 25.6 % (ref 40–54)
HGB BLD-MCNC: 7.7 G/DL (ref 14–18)
IMM GRANULOCYTES # BLD AUTO: 0.08 K/UL (ref 0–0.04)
IMM GRANULOCYTES NFR BLD AUTO: 0.6 % (ref 0–0.5)
LYMPHOCYTES # BLD AUTO: 1.8 K/UL (ref 1–4.8)
LYMPHOCYTES NFR BLD: 14 % (ref 18–48)
MAGNESIUM SERPL-MCNC: 1.8 MG/DL (ref 1.6–2.6)
MCH RBC QN AUTO: 27.8 PG (ref 27–31)
MCHC RBC AUTO-ENTMCNC: 30.1 G/DL (ref 32–36)
MCV RBC AUTO: 92 FL (ref 82–98)
MONOCYTES # BLD AUTO: 1.1 K/UL (ref 0.3–1)
MONOCYTES NFR BLD: 8.3 % (ref 4–15)
NEUTROPHILS # BLD AUTO: 9.3 K/UL (ref 1.8–7.7)
NEUTROPHILS NFR BLD: 72.6 % (ref 38–73)
NRBC BLD-RTO: 0 /100 WBC
NUM UNITS TRANS PACKED RBC: NORMAL
PHOSPHATE SERPL-MCNC: 2.5 MG/DL (ref 2.7–4.5)
PLATELET # BLD AUTO: 512 K/UL (ref 150–350)
PMV BLD AUTO: 9.4 FL (ref 9.2–12.9)
POTASSIUM SERPL-SCNC: 3.8 MMOL/L (ref 3.5–5.1)
RBC # BLD AUTO: 2.77 M/UL (ref 4.6–6.2)
SARS-COV-2 RDRP RESP QL NAA+PROBE: NEGATIVE
SODIUM SERPL-SCNC: 150 MMOL/L (ref 136–145)
WBC # BLD AUTO: 12.77 K/UL (ref 3.9–12.7)

## 2020-12-22 PROCEDURE — 63600175 PHARM REV CODE 636 W HCPCS: Performed by: INTERNAL MEDICINE

## 2020-12-22 PROCEDURE — 80048 BASIC METABOLIC PNL TOTAL CA: CPT

## 2020-12-22 PROCEDURE — 83735 ASSAY OF MAGNESIUM: CPT

## 2020-12-22 PROCEDURE — 84100 ASSAY OF PHOSPHORUS: CPT

## 2020-12-22 PROCEDURE — 25000003 PHARM REV CODE 250: Performed by: INTERNAL MEDICINE

## 2020-12-22 PROCEDURE — 85025 COMPLETE CBC W/AUTO DIFF WBC: CPT

## 2020-12-22 PROCEDURE — U0002 COVID-19 LAB TEST NON-CDC: HCPCS

## 2020-12-22 PROCEDURE — 99900035 HC TECH TIME PER 15 MIN (STAT)

## 2020-12-22 PROCEDURE — 94640 AIRWAY INHALATION TREATMENT: CPT

## 2020-12-22 PROCEDURE — 99900026 HC AIRWAY MAINTENANCE (STAT)

## 2020-12-22 PROCEDURE — 86850 RBC ANTIBODY SCREEN: CPT

## 2020-12-22 PROCEDURE — 25000242 PHARM REV CODE 250 ALT 637 W/ HCPCS: Performed by: INTERNAL MEDICINE

## 2020-12-22 PROCEDURE — 25000003 PHARM REV CODE 250: Performed by: NURSE PRACTITIONER

## 2020-12-22 PROCEDURE — P9016 RBC LEUKOCYTES REDUCED: HCPCS

## 2020-12-22 PROCEDURE — 94761 N-INVAS EAR/PLS OXIMETRY MLT: CPT

## 2020-12-22 PROCEDURE — 27000221 HC OXYGEN, UP TO 24 HOURS

## 2020-12-22 PROCEDURE — 86920 COMPATIBILITY TEST SPIN: CPT

## 2020-12-22 RX ORDER — HYDROCODONE BITARTRATE AND ACETAMINOPHEN 500; 5 MG/1; MG/1
TABLET ORAL
Status: DISCONTINUED | OUTPATIENT
Start: 2020-12-22 | End: 2020-12-22 | Stop reason: HOSPADM

## 2020-12-22 RX ORDER — SULFAMETHOXAZOLE AND TRIMETHOPRIM 800; 160 MG/1; MG/1
1 TABLET ORAL 2 TIMES DAILY
Qty: 34 TABLET | Refills: 0
Start: 2020-12-22 | End: 2021-01-08

## 2020-12-22 RX ORDER — DILTIAZEM HYDROCHLORIDE 30 MG/1
30 TABLET, FILM COATED ORAL EVERY 8 HOURS
Qty: 90 TABLET | Refills: 0
Start: 2020-12-22 | End: 2021-01-21

## 2020-12-22 RX ORDER — SUCRALFATE 1 G/10ML
1 SUSPENSION ORAL EVERY 6 HOURS
Qty: 280 ML | Refills: 0
Start: 2020-12-22 | End: 2020-12-29

## 2020-12-22 RX ORDER — MINOCYCLINE HYDROCHLORIDE 100 MG/1
100 CAPSULE ORAL EVERY 12 HOURS
Qty: 34 CAPSULE | Refills: 0
Start: 2020-12-22 | End: 2021-01-08

## 2020-12-22 RX ORDER — PANTOPRAZOLE SODIUM 40 MG/1
40 FOR SUSPENSION ORAL 2 TIMES DAILY
Qty: 60 PACKET | Refills: 0
Start: 2020-12-22 | End: 2021-01-21

## 2020-12-22 RX ORDER — METOPROLOL TARTRATE 50 MG/1
50 TABLET ORAL 2 TIMES DAILY
Qty: 60 TABLET | Refills: 0
Start: 2020-12-22 | End: 2021-01-21

## 2020-12-22 RX ORDER — FUROSEMIDE 20 MG/1
20 TABLET ORAL DAILY
Status: DISCONTINUED | OUTPATIENT
Start: 2020-12-22 | End: 2020-12-22 | Stop reason: HOSPADM

## 2020-12-22 RX ORDER — LEVALBUTEROL INHALATION SOLUTION 0.63 MG/3ML
0.63 SOLUTION RESPIRATORY (INHALATION) EVERY 8 HOURS
Qty: 270 ML | Refills: 0
Start: 2020-12-22 | End: 2021-01-21

## 2020-12-22 RX ADMIN — AMPICILLIN SODIUM AND SULBACTAM SODIUM 3 G: 2; 1 INJECTION, POWDER, FOR SOLUTION INTRAMUSCULAR; INTRAVENOUS at 04:12

## 2020-12-22 RX ADMIN — DILTIAZEM HYDROCHLORIDE 30 MG: 30 TABLET, FILM COATED ORAL at 05:12

## 2020-12-22 RX ADMIN — METOPROLOL TARTRATE 50 MG: 50 TABLET, FILM COATED ORAL at 10:12

## 2020-12-22 RX ADMIN — SUCRALFATE 1 G: 1 SUSPENSION ORAL at 11:12

## 2020-12-22 RX ADMIN — FUROSEMIDE 20 MG: 20 TABLET ORAL at 10:12

## 2020-12-22 RX ADMIN — SUCRALFATE 1 G: 1 SUSPENSION ORAL at 05:12

## 2020-12-22 RX ADMIN — MINOCYCLINE HYDROCHLORIDE 100 MG: 50 CAPSULE ORAL at 10:12

## 2020-12-22 RX ADMIN — LEVALBUTEROL HYDROCHLORIDE 0.63 MG: 0.63 SOLUTION RESPIRATORY (INHALATION) at 12:12

## 2020-12-22 RX ADMIN — DILTIAZEM HYDROCHLORIDE 30 MG: 30 TABLET, FILM COATED ORAL at 03:12

## 2020-12-22 RX ADMIN — SUCRALFATE 1 G: 1 SUSPENSION ORAL at 12:12

## 2020-12-22 RX ADMIN — AMPICILLIN SODIUM AND SULBACTAM SODIUM 3 G: 2; 1 INJECTION, POWDER, FOR SOLUTION INTRAMUSCULAR; INTRAVENOUS at 06:12

## 2020-12-22 RX ADMIN — AMPICILLIN SODIUM AND SULBACTAM SODIUM 3 G: 2; 1 INJECTION, POWDER, FOR SOLUTION INTRAMUSCULAR; INTRAVENOUS at 01:12

## 2020-12-22 RX ADMIN — LEVALBUTEROL HYDROCHLORIDE 0.63 MG: 0.63 SOLUTION RESPIRATORY (INHALATION) at 05:12

## 2020-12-22 RX ADMIN — LEVALBUTEROL HYDROCHLORIDE 0.63 MG: 0.63 SOLUTION RESPIRATORY (INHALATION) at 08:12

## 2020-12-22 RX ADMIN — MICONAZOLE NITRATE: 2 OINTMENT TOPICAL at 10:12

## 2020-12-22 RX ADMIN — SULFAMETHOXAZOLE AND TRIMETHOPRIM 1 TABLET: 800; 160 TABLET ORAL at 10:12

## 2020-12-22 RX ADMIN — IRON SUCROSE 200 MG: 20 INJECTION, SOLUTION INTRAVENOUS at 10:12

## 2020-12-22 RX ADMIN — PANTOPRAZOLE SODIUM 40 MG: 40 GRANULE, DELAYED RELEASE ORAL at 10:12

## 2020-12-22 NOTE — PLAN OF CARE
FELISHA spoke with Luz at [facility]. Pt can return once D/C orders received. Nurse can call report to [phone] after d/c order received and reviewed.    Nahum White LMSW 12/22/2020 10:21 AM

## 2020-12-22 NOTE — PLAN OF CARE
Patient remained afebrile throughout shift.  Patient remained free of falls this shift  Patient free of S/S pain this shift  Plan of care reviewed with pt's mother over phone.  Patient completely dependent with  Moving/turning. Weight shifting assistance provided Q2.  Patient ST on monitor  Bed low, side rails up x3, wheels locked, room near nurses station.  Bed alarm maintained for safety.  PEG tube intact, feeding of isosoruce 1.5 maintained.  Hourly rounding completed.  Will continue to monitor.

## 2020-12-22 NOTE — PLAN OF CARE
Sw spoke with pt's mother and informed her that pt will be d/c back to [facility] today.     Nahum White LMSW 12/22/2020 11:46 AM

## 2020-12-22 NOTE — PLAN OF CARE
FELISHA faxed AVS and D/C orders to [facility].    Nahum White LMSW 12/22/2020 11:53 AM

## 2020-12-22 NOTE — PHYSICIAN QUERY
PT Name: Glen Moscoso  MR #: [id number]     Documentation Clarification      CDS/: Yola Valdes RN, CDS               Contact information: [email or web address]      This form is a permanent document in the medical record.     Query Date: December 22, 2020    By submitting this query, we are merely seeking further clarification of documentation. Please utilize your independent clinical judgment when addressing the question(s) below.    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 His stool for occult blood is positive and he has had a noted two  point drop in his hgb and hct  from 3 days ago. His BP has been stable.  Patient is being placed in hospital for further evaluation and treatment including GI and Surgery consult, Iv Abx for sepsis, and close monitoring of his GI bleed.     Sepsis  This patient does have evidence of infective focus  My overall impression is sepsis. Antibiotics given-   Antibiotics (From admission, onward):   meropenem-0.9% sodium chloride 500 mg/50 mL IVPB     -- Every 6 hours (non-standard times)    linezolid 600 mg/300 mL IVPB 600 mg     -- Every 12 hours (non-standard times)   Source- Suspected GI  Source control Achieved by- Iv Abx  ID, GI,  and General Surgery consulted      His CRP and ESR are high.  Following cultures.  Continues to be on Levaquin and Unasyn.       Blood Culture, Routine No growth after 5 days    Respiratory Culture Abnormal   SERRATIA MARCESCENS   Moderate      Specimen Information: Buttocks, Right; Wound  Component 8d ago  Aerobic Bacterial Culture Abnormal   STENOTROPHOMONAS (X.) MALTOPHILIA   Moderate    Aerobic Bacterial Culture Abnormal   ACINETOBACTER BAUMANNII    Many    H&P 12/13           H&P 12/13                 H&P 12/13                         HM PN 12/15         Blood Cultures 12/13     Respiratory Culture 12/14         Wound Culture 12/14                                                                             Provider, please clarify any organism(s) associated with sepsis. Please select all that apply if applicable.    [ X  ] Serratia Marcescens   [   ] Stenotrophomonas Maltophilia   [   ] Acinetobacter baumannii    [   ] Other (please specify): ____________   [  ] Clinically undetermined

## 2020-12-22 NOTE — PHYSICIAN QUERY
PT Name: Glen Moscoso  MR #: [id number]     RESPIRATORY CONDITION CLARIFICATION   Zoe Patino RN, CCDS; [email or web address]    This form is a permanent document in the medical record.    Query Date: December 22, 2020    By submitting this query, we are merely seeking further clarification of documentation.    Please utilize your independent clinical judgment when addressing the question(s) below.    The Medical Record contains the following:   Indicators   Supporting Clinical Findings Location in Medical Record    Pneumonia documented     x Chest X-Ray/CT Scan CXR 12/6  Tracheostomy tube.  Central line present with the tip overlying the superior vena cava.  Lung fields are clear. No acute infiltrate or consolidation.     CXR 12/13  No definite infiltrate seen at this time.   Radiology   x PaO2   PaCO2   O2 sat          Sats: 99 - 100% on 5-6 lpm TC O2 VS Flow Sheet 12/6-10   x WBC WBC:   15.59 - 12.65 - 17.28 - 15.69        Plts:  661 - 362  ANC:     8.0 - 13.8                                      Cr:    0.6 - 0.7  Bili,T:     0.1 - 0.7                                       CRP: 58.4 on 12/14  Lactate: 1.5, 1.1                                         Procal: 1.57 Lab 12/6-10   x Vital Signs Tm: 100.2 on 12/6        HR: 119 - 85                 RR: 24 - 14  BP: 105/59 - 134/87     VS Flow Sheet 12/6-10   x Cultures  BC 12/6: NGTD      urine cx 12/6: Candida Tropicalis  10,000 - 49,999 cfu/ml                                   Resp Cx 12/10: Acintobacter B = many;   E. Coli = Moderate  Resp Cx 12/13: Serratia Marcescens    = moderate    12/14: R Buttock Wound             Stenotrophomonas Meltophilia   = moderate             Acinetoacter Baumanni   = Many   Lab   x Treatment  Metronidazole IVPB 12/6-10          Linezolid IV   12/6-10  Ceftriaxone IV          12/6 - 8    IVF: LR 1,080 ml over 2 hours in ED   MAR   x Supplemental O2 5-6 lpm TC O2 VS Flow Sheet 12/6-10    Dysphagia/Swallow study     x Other Sepsis due  to other - specify source and organism:     source: ___Resp Cx: Sputum?     Resp Cx 12/10: Acintobacter B = many;   E. Coli = Moderate Resp Cx 12/13:   Serratia Marcescens = moderate  Query Response 12/18     Provider, please provide the diagnosis related to the above clinical indicators:     - 2 part question -     Part 1 of 2:     [x   ] Bacterial pneumonia due to Acintobacter B;  E. Coli & Serratia Marcescens    [   ] Unspecified Pneumonia   [   ] Pneumonia ruled out    [   ] Other type of pneumonia (please specify): ________   [   ] Other respiratory diagnosis (please specify): _________   [  ] Clinically undetermined     Present on admission (POA) status:  [ x  ] Yes (Y)   [   ] No (N)   [   ] Documentation insufficient to determine if condition is POA (U)   [  ] Clinically Undetermined (W)     Please document in your progress notes daily for the duration of treatment, until resolved, and include in your discharge summary.     Form No. [id number]

## 2020-12-22 NOTE — PLAN OF CARE
FELISHA informed Dr. Bobo that pt can return to [facility] once d/c orders are in. Pt needs to receive a unit of blood before d/c.    Nahum White LMSW 12/22/2020 10:22 AM

## 2020-12-22 NOTE — NURSING
[facility] order placed at this time to arrange transport back to [place] via ambulance. Report called to receiving facility by primary nurse ROBBIN Maguire.

## 2020-12-22 NOTE — PLAN OF CARE
12/22/20 1024   Post-Acute Status   Post-Acute Authorization Placement   Post-Acute Placement Status Awaiting Orders for LTAC   Discharge Plan   Discharge Plan A Long-term acute care facility (LTAC)   Discharge Plan B Long-term acute care facility (LTAC)       Nahum White LMSW 12/22/2020 10:25 AM

## 2020-12-22 NOTE — PLAN OF CARE
12/22/20 1237   Final Note   Assessment Type Final Discharge Note   Anticipated Discharge Disposition LTAC   Hospital Follow Up  Appt(s) scheduled? Yes   Discharge plans and expectations educations in teach back method with documentation complete? Yes   Right Care Referral Info   Post Acute Recommendation Other  (LTAC)   Post-Acute Status   Post-Acute Authorization Placement   Post-Acute Placement Status Set-up Complete       Nauhm White LMSW 12/22/2020 12:37 PM

## 2020-12-23 ENCOUNTER — DOCUMENTATION ONLY (OUTPATIENT)
Dept: INFECTIOUS DISEASES | Facility: HOSPITAL | Age: 25
End: 2020-12-23

## 2020-12-23 NOTE — DISCHARGE SUMMARY
"[facility] Hospital Medicine  Discharge Summary      Patient Name: Glen Moscoso  MRN: [id number]  Patient Class: IP- Inpatient  Admission Date: 12/13/2020  Hospital Length of Stay: 9 days  Discharge Date and Time: 12/22/2020  6:05 PM  Attending Physician: No att. 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 Ortez on 12/08/2020 due to colostomy prolapse) due to bilateral sacral decubitus ulcers. He is a resident of [facility] where he was sent from today for reports of tachycardia, coffee ground emesis,  and dark/black stool noted coming from his colostomy. His stool for occult blood is positive and he has had a noted two  point drop in his hgb and hct  from 3 days ago. His BP has been stable.  Patient is being placed in hospital for further evaluation and treatment including GI and Surgery consult, Iv Abx for sepsis, and close monitoring of his GI bleed.     Patient seen in ER.    I had a long discussion with Mother, Leni Moscoso, [phone], at bedside and updated her on patient's status. Patient is extremely debilitated with multiple comorbidities. Mother is the decision maker and states patient is a Full Code. I explained to her patient's guarded prognosis and she  "wants everything done".      Procedure(s) (LRB):  EGD (ESOPHAGOGASTRODUODENOSCOPY) (N/A)      Hospital Course:   12/14:  Patient overall stable overnight however continues to be tachycardic.  Range of between 01/20/2040.  His alkaline phosphatase has trended upwards.  Appreciate gastroenterology recommendations.  No evidence of active " bleeding at this time per PEG colostomy.  Getting CT abdomen and pelvis with contrast for further insight into alkaline phosphatase and GGT elevation.  Discussed case Dr. Ortez and GI Dr. Bautista. Consulted palliative care to discuss goals of care. Discussed with Amanda Colmenares (palliative) and mother does not want to consider what to do if his condition worsens    12/15:  Patient's condition continues being guarded.  Appreciate infectious disease recs.  Zyvox and fluconazole was stopped.  His CRP and ESR are high.  Following cultures.  Continues to be on Levaquin and Unasyn.  CT angiogram yesterday ruled out pulmonary embolism and however confirmed atelectasis some pulmonary effusion in the setting postoperative status.  Unfortunately does not have lot options for positive pressure as he cannot follow directions to use incentive spirometry and not able to get up and move around to mobilize fluid.  Discussed case with mother.  Continues to have elevated phosphatase.  No structural reason this imaging.  Will be going for upper endoscopy today.    12/16:  No significant change over night.  Patient does appear to be more alert and moving his eyes looking around today.  Mom has no new concerns. WBC stable from yesterday.  Heart rate appears to be similar stable around 130. Discussed with Infectious Disease who continues to follow patient and is watching for culture results.  No changes to the antibiotic therapy at this time.    12/17:  Heart rate has trended down over the last 24 hr which is reassuring.  However, since yesterday his oxygen requirement has increased.  Pending repeat chest x-ray this morning.  Sodium elevated and potassium low this morning.  Replacing potassium through G-tube and rechecking sodium after increasing fluid rate.  He is only been getting 50 mL/hr.  Increasing this to 100 mL per hour and will repeat sodium potassium level 2:00 p.m. this afternoon.  Unable to discuss with mother as she was not  present in the room today.  Will round later to speak with her.    12/18- Remains on O2 10L/min via  trach collar . No acute distress noted .No tachypnea.  Remains tachycardiac . Add Diltiazem per G tube along with Metoprolol. Tube feed held per Pulmonology and Lasix IV initiated . Repeat CXR in am to evaluate Left lung atelectasis/collapse.Consult RT for tracheal suctioning.  Hypernatremia , hypokalemia and hypomagnesemia again noted. Will replete accordingly . Gentle hydration continues while on Lasix for kathia pleural effusion. Current antibiotic - Unasyn and Levofloxacin.     12/19- Follow up CXR resulted marked improvement in pulmonary aeration in the left lung with near resolution of atelectasis . O2 down to 8L/min. ST on monitor . Increase Metoprolol to 50 mg bid . Resume tube feeds today . Wound vac to kathia ischium placed yesterday.  Antibiotic changed to Unasyn, Minocycline  and Bactrim per ID. Possible DC in 24 to 48h     12/20- Persistent sinus tachycardia but slightly better. Meds adjusted. Started tube feeds and tolerating . Stool is yellow and no signs of active bleed. H/H stable at 8.3/28. Remains on O2 8 to 10L/min via trach collar. Repeat CXR resulted near resolution of left lung atelectasis. Continue trach care and tracheal suctioning per RT.  Continue Antibiotic - Unasyn, Minocycline and Bactrim per ID recs. Pt is medically stable to return to LTAC.     12/21- Noted fever 102 last night . Blood cultures ordered. No further fever noted since . Continue current antibiotic . Slight drop in H/H noted . Will monitor. Will give iron sucrose injection. WBC mildly elevated.  Disposition- [facility]     12/22- Remains afebrile. Repeat blood cultures from 12/21 resulted NGTD. Antibiotic Unasyn IV/ Minocycline and Bactrim per PEG tube continues . Wound vac kathia ischium in place . H/H resulted moderate anemia without further signs of active bleed. 1 unit of P-RBC transfused today  to optimize Hgb/Hct . Pt is  examined and deemed stable to return to [facility] today.      Consults:   Consults (From admission, onward)        Status Ordering Provider     Inpatient consult to Cardiology  Once     Provider:  Herrera Maldonado MD    Completed IBIS CARPENTER     Inpatient consult to Gastroenterology  Once     Provider:  Mickie Bautista MD    Completed ARTURO HARRINGTON     Inpatient consult to General Surgery  Once     Provider:  Jacquelyn Rao MD    Completed IBIS CARPENTER     Inpatient consult to Palliative Care  Once     Provider:  Amanda Miranda PA-C    Completed REMY PINEDA     Inpatient consult to Pulmonology  Once     Provider:  Maikel Garcia MD    Completed REMY PINEDA     Inpatient consult to Registered Dietitian/Nutritionist  Once     Provider:  (Not yet assigned)    Completed IBIS CARPENTER     Inpatient consult to Registered Dietitian/Nutritionist  Once     Provider:  (Not yet assigned)    Completed REMY PINEDA     IP consult to case management  Once     Provider:  (Not yet assigned)    Completed REMY PINEDA          No new Assessment & Plan notes have been filed under this hospital service since the last note was generated.  Service: Hospital Medicine    Final Active Diagnoses:    Diagnosis Date Noted POA    PRINCIPAL PROBLEM:  Sepsis [A41.9] 12/13/2020 Yes    Gastrointestinal hemorrhage [K92.2] 12/13/2020 Yes    Atelectasis, left Lung with hypoxia  [J98.11] 12/15/2020 Yes    Status post colostomy [Z93.3] 10/31/2020 Not Applicable    Status post tracheostomy [Z93.0] 10/27/2020 Not Applicable    Sinus tachycardia [R00.0] 08/04/2020 Yes    Electrolyte abnormality [E87.8] 12/18/2020 Yes    Bilateral pleural effusion [J90] 12/17/2020 Yes    Acute gastric ulcer with hemorrhage [K25.0] 12/15/2020 Yes    Melena [K92.1] 12/15/2020 Yes    Elevated alkaline phosphatase level [R74.8] 12/14/2020 Yes    Encounter for palliative care [Z51.5] 12/14/2020 Not  Applicable    Candiduria [B37.49] 12/14/2020 Yes    Hypertensive urgency [I16.0] 12/13/2020 Yes    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] 12/13/2020 Yes    History of infection due to multidrug resistant Acinetobacter baumannii [Z86.19] 12/13/2020 Yes    Chronic indwelling Iglesias catheter [Z97.8] 12/13/2020 Not Applicable    Sacral decubitus ulcer [L89.159] 12/13/2020 Yes    Abnormal EKG [R94.31] 12/13/2020 Yes    Severe malnutrition [E43] 12/07/2020 Yes    Seizure disorder [G40.909] 08/26/2020 Yes     Chronic    Aspiration pneumonia [J69.0] 08/09/2020 No    Cerebral palsy [G80.9] 08/04/2020 Yes     Chronic      Problems Resolved During this Admission:    Diagnosis Date Noted Date Resolved POA    Melena [K92.1] 12/14/2020 12/15/2020 Yes       Discharged Condition: stable    Disposition: Long Term Care    Follow Up:  Follow-up Information     Yadi Lawson MD. Go on 12/29/2020.    Specialty: Family Medicine  Why: Discharge follow up at 10:00am  Contact information:  [address] [address] [address] [phone]             [facility].    Why: LTAC  Contact information:  [address] [facility] [address] [phone]               Patient Instructions:      Tube Feedings/Formulas   Order Comments: Isosource 1.5 @ 15mL/hr to start and increase 10mL every 4-6 hours, or as tolerated, until goal rate of 40mL/hr is met to provide 960mL total volume; 1440kcals; 65gm protein; 747 mL free H2O. Additionally, this provides 2300mg (57.6mEq) of Potassium.   2. Recommend continuous water flushes @ 30mL/hr     Order Specific Question Answer Comments   Select Adult Formula: Isosource 1.5 jarred    Route: Gastrostomy      Activity as tolerated       Significant Diagnostic Studies: Labs:   BMP:   Recent Labs   Lab 12/21/20  1000 12/22/20  0518   GLU 85 93   * 150*   K 3.8 3.8   * 116*   CO2 23 27   BUN 14 16   CREATININE 0.7 0.7    CALCIUM 7.2* 7.6*   MG  --  1.8   , CMP   Recent Labs   Lab 12/21/20  1000 12/22/20  0518   * 150*   K 3.8 3.8   * 116*   CO2 23 27   GLU 85 93   BUN 14 16   CREATININE 0.7 0.7   CALCIUM 7.2* 7.6*   ANIONGAP 9 7*   ESTGFRAFRICA >60 >60   EGFRNONAA >60 >60    and CBC   Recent Labs   Lab 12/21/20  1000 12/22/20  0518   WBC 15.70* 12.77*   HGB 7.9* 7.7*   HCT 26.2* 25.6*   * 512*     Microbiology:   Blood Culture   Lab Results   Component Value Date    LABBLOO No Growth to date 12/21/2020    LABBLOO No Growth to date 12/21/2020    LABBLOO No Growth to date 12/21/2020    LABBLOO No Growth to date 12/21/2020       Pending Diagnostic Studies:     Procedure Component Value Units Date/Time    CBC Auto Differential [[id number]] Collected: 12/18/20 0430    Order Status: Sent Lab Status: In process Updated: 12/18/20 0430    Specimen: Blood          Medications:  Reconciled Home Medications:      Medication List      START taking these medications    AMPICILLIN/SULBACTAM 3 G/100 ML NS (READY TO MIX)  Inject 100 mLs (3 g total) into the vein every 4 (four) hours. for 14 days     diltiaZEM 30 MG tablet  Commonly known as: CARDIZEM  1 tablet (30 mg total) by Per G Tube route every 8 (eight) hours.     levalbuterol 0.63 mg/3 mL nebulizer solution  Commonly known as: XOPENEX  Take 3 mLs (0.63 mg total) by nebulization every 8 (eight) hours. Rescue     metoprolol tartrate 50 MG tablet  Commonly known as: LOPRESSOR  1 tablet (50 mg total) by Per G Tube route 2 (two) times daily.     minocycline 100 MG capsule  Commonly known as: MINOCIN,DYNACIN  Take 1 capsule (100 mg total) by mouth every 12 (twelve) hours. for 34 doses     pantoprazole 40 mg suspension  Commonly known as: PROTONIX  1 packet (40 mg total) by Per G Tube route 2 (two) times daily.     sucralfate 100 mg/mL suspension  Commonly known as: CARAFATE  10 mLs (1 g total) by Per G Tube route every 6 (six) hours. for 7 days     sulfamethoxazole-trimethoprim  800-160mg 800-160 mg Tab  Commonly known as: BACTRIM DS  Take 1 tablet by mouth 2 (two) times daily. for 34 doses        CONTINUE taking these medications    acetaminophen 325 MG tablet  Commonly known as: TYLENOL  Take 325 mg by mouth every 4 (four) hours as needed for Pain.     ascorbic acid (vitamin C) 500 MG tablet  Commonly known as: VITAMIN C  1 tablet (500 mg total) by Per G Tube route once daily.     baclofen 10 MG tablet  Commonly known as: LIORESAL  1 tablet (10 mg total) by Per G Tube route 3 (three) times daily.     bisacodyL 10 mg Supp  Commonly known as: DULCOLAX  Place 1 suppository (10 mg total) rectally daily as needed (Until bowel movement if patient has no bowel movement for 2 days).     diphenhydrAMINE 12.5 mg/5 mL elixir  Commonly known as: BENADRYL  10 mLs (25 mg total) by Per G Tube route 4 (four) times daily as needed for Allergies.     gabapentin 250 mg/5 mL solution  Commonly known as: NEURONTIN  5 mLs (250 mg total) by Per G Tube route nightly. At bedtime     CHEO 7-7-1.5 gram Pwpk  Generic drug: arginine-glutamine-calcium HMB  Take by mouth 2 (two) times a day.     lactobacillus acidophilus & bulgar 100 million cell packet  Commonly known as: LACTINEX  Take 1 tablet by mouth once daily.     melatonin 3 mg tablet  Commonly known as: MELATIN  2 tablets (6 mg total) by Per G Tube route nightly as needed for Insomnia.     miconazole nitrate 2% 2 % Oint  Commonly known as: MICOTIN  Apply topically 2 (two) times daily. for 14 days     multivitamin liquid no.118 Liqd  10 mLs by Per G Tube route once daily.     PHENobarbitaL 20 mg/5 mL (4 mg/mL) Elix elixir  25 mLs (100 mg total) by Per G Tube route every evening.        STOP taking these medications    linezolid 100 mg/5 mL Susr  Commonly known as: ZYVOX     potassium chloride 20 mEq Pack  Commonly known as: KLOR-CON     propranoloL 20 MG tablet  Commonly known as: INDERAL            Indwelling Lines/Drains at time of discharge:    Lines/Drains/Airways     Central Venous Catheter Line            Tunneled Central Line Insertion/Assessment - Double Lumen  09/28/20 1400 right subclavian 85 days          Drain                 Gastrostomy/Enterostomy 08/04/20 1653 Percutaneous endoscopic gastrostomy (PEG) feeding 140 days         Gastrostomy/Enterostomy 10/22/20 1300 LUQ 61 days         Colostomy 10/29/20 1349 Descending/sigmoid LLQ 54 days         Urethral Catheter 12/06/20 1207 Latex 16 Fr. 16 days          Airway                 Surgical Airway Shiley Cuffed -- days         Surgical Airway 10/27/20 0855 Shiley Cuffed 56 days         Airway - Non-Surgical 12/08/20 0924 Endotracheal Tube 14 days                Time spent on the discharge of patient: 40  minutes  Patient was seen and examined on the date of discharge and determined to be suitable for discharge.         Travis Bobo MD  Department of Hospital Medicine  [facility]

## 2020-12-23 NOTE — PROGRESS NOTES
Respiratory cultures reviewed-      Specimen: Respiratory from Endotracheal Aspirate Updated: 12/22/20 6585    Respiratory Culture No S aureus or Pseudomonas isolated.     ESCHERICHIA COLI ESBL   Few   Normal respiratory jose also present   Abnormal     Gram Stain (Respiratory) <10 epithelial cells per low power field.    Gram Stain (Respiratory) Few WBC's         Will use 10 days of Meropenem -500mg every 6 hours for 10 days -will inform the LTAC

## 2020-12-26 LAB
BACTERIA BLD CULT: NORMAL
BACTERIA BLD CULT: NORMAL

## 2021-05-04 ENCOUNTER — PATIENT MESSAGE (OUTPATIENT)
Dept: RESEARCH | Facility: HOSPITAL | Age: 26
End: 2021-05-04

## 2022-09-16 NOTE — SUBJECTIVE & OBJECTIVE
Interval History: No acute events overnight.  Remains afebrile, no leukocytosis.  Remains on room air, O2 sats 98%.  Tachycardic, but HDS.  Respiratory cx again showing Serratia - remains cefepime S.     Review of Systems   Unable to perform ROS: Patient nonverbal     Objective:     Vital Signs (Most Recent):  Temp: 98.4 °F (36.9 °C) (10/06/20 0713)  Pulse: 96 (10/06/20 0739)  Resp: 16 (10/06/20 0739)  BP: 116/68 (10/06/20 0713)  SpO2: 100 % (10/06/20 0902) Vital Signs (24h Range):  Temp:  [98.1 °F (36.7 °C)-99.6 °F (37.6 °C)] 98.4 °F (36.9 °C)  Pulse:  [] 96  Resp:  [16-22] 16  SpO2:  [96 %-100 %] 100 %  BP: (116-136)/(68-95) 116/68     Weight: 34 kg (74 lb 15.3 oz)  Body mass index is 16.22 kg/m².    Estimated Creatinine Clearance: 111.4 mL/min (based on SCr of 0.5 mg/dL).    Physical Exam  Vitals signs and nursing note reviewed.   Constitutional:       General: He is not in acute distress.     Appearance: He is well-developed. He is cachectic. He is ill-appearing (Chronic).   Eyes:      Conjunctiva/sclera: Conjunctivae normal.   Neck:      Vascular: No JVD.   Cardiovascular:      Rate and Rhythm: Normal rate and regular rhythm.      Heart sounds: Normal heart sounds. No murmur. No friction rub. No gallop.    Pulmonary:      Effort: Pulmonary effort is normal. No respiratory distress.      Breath sounds: No wheezing.      Comments: On room air  Abdominal:      General: Abdomen is flat. Bowel sounds are normal. There is no distension.      Palpations: Abdomen is soft.      Tenderness: There is no abdominal tenderness. There is no guarding.      Comments: PEG tube in place - c/d/i   Skin:     Capillary Refill: Capillary refill takes less than 2 seconds.      Findings: No erythema or rash.      Comments: Tunneled cathter RU chest - c/d/i   Psychiatric:         Speech: He is noncommunicative.         Behavior: Behavior is cooperative.         Significant Labs:   Blood Culture:   Recent Labs   Lab 09/20/20  8014  Please advise  Spoke to pt. States she received her first Depo shot on 05/25. She was supposed to received her next one between Aug 10- to Aug 24 and she never scheduled it. Pt is wondering if she can get her next dose as she is still planning on continue to be on it.   09/20/20  1854 09/24/20  0533 10/03/20  1515 10/04/20  0133   LABBLOO No growth after 5 days. No growth after 5 days. No growth after 5 days.  No growth after 5 days. No Growth to date  No Growth to date  No Growth to date No Growth to date  No Growth to date  No Growth to date     CBC:   Recent Labs   Lab 10/05/20  0316 10/06/20  0259   WBC 10.27 10.13   HGB 8.4* 8.2*   HCT 27.8* 26.9*   * 634*     CMP:   Recent Labs   Lab 10/05/20  0316 10/06/20  0259   * 137   K 3.7 3.6    102   CO2 24 26   * 118*   BUN 7 7   CREATININE 0.4* 0.5   CALCIUM 8.5* 8.3*   PROT 6.5 6.2   ALBUMIN 2.0* 2.0*   BILITOT 0.1 0.1   ALKPHOS 79 80   AST 15 12   ALT 8* 9*   ANIONGAP 8 9   EGFRNONAA >60.0 >60.0     Respiratory Culture:   Recent Labs   Lab 08/26/20  0505 08/31/20  2157 09/06/20  1103 10/03/20  1749   GSRESP <10 epithelial cells per low power field.  Moderate WBC's  Many Gram negative rods  Many Gram negative diplococci <10 epithelial cells per low power field.  Rare WBC's  Rare Gram negative rods >10 epithelial cells per low power field  Rare WBC's  Rare Gram negative rods <10 epithelial cells per low power field.  Rare WBC's  Rare budding yeast   RESPIRATORYC No S aureus isolated.  SERRATIA MARCESCENS  Moderate  Normal respiratory jose also present  *  PSEUDOMONAS AERUGINOSA  Moderate  * No S aureus or Pseudomonas isolated.  SERRATIA MARCESCENS  Moderate  * No S aureus or Pseudomonas isolated.  SERRATIA MARCESCENS  Moderate  * No S aureus or Pseudomonas isolated.  SERRATIA MARCESCENS  Few  *     Urine Culture:   Recent Labs   Lab 08/26/20  0915   LABURIN No significant growth     Urine Studies:   Recent Labs   Lab 08/09/20  0555  10/04/20  0208 10/06/20  0626   COLORU Yellow   < > Vane Straw   APPEARANCEUA Hazy*   < > Cloudy* Clear   PHUR 6.0   < > 6.0 7.0   SPECGRAV >=1.030*   < > 1.015 1.000*   PROTEINUA 1+*   < > 1+* Negative   GLUCUA Negative   < > Negative Negative   KETONESU  Negative   < > Negative Negative   BILIRUBINUA Negative   < > Negative Negative   OCCULTUA 3+*   < > Negative Negative   NITRITE Negative   < > Negative Negative   UROBILINOGEN Negative  --   --   --    LEUKOCYTESUR Negative   < > 2+* Negative   RBCUA 40*   < > 0  --    WBCUA 8*   < > 7*  --    BACTERIA Occasional   < > Few*  --    SQUAMEPITHEL  --    < > 2  --    HYALINECASTS 2*   < > 10*  --     < > = values in this interval not displayed.     Wound Culture: No results for input(s): LABAERO in the last 4320 hours.    Significant Imaging: I have reviewed all pertinent imaging results/findings within the past 24 hours.

## 2023-06-27 NOTE — SIGNIFICANT EVENT
From: Lamar Philip  To: Donta Quiroga  Quiroga first.    Dr. Eren Abreu MD   Notified by microbiology lab that pt's blood culture came back positive for gram positive cocci in chains resembling strep - Dr. Morales notified and requested that [facility] ([facility]) be informed of the need to repeat blood cultures. [facility] called and information conveyed to charge nurse. Instructed to inform Dr. Morales if repeat cultures are also positive. Charge nurse indicated understanding and agreed to take care of this.

## 2023-08-10 NOTE — PROGRESS NOTES
[facility] -   Critical Care Medicine  Progress Note    Patient Name: Glen Moscoso  MRN: [id number]  Admission Date: 10/22/2020  Hospital Length of Stay: 1 days  Code Status: Full Code  Attending Provider: Smitha Smith MD  Primary Care Provider: Yadi Lawson MD   Principal Problem: Acute hypoxemic respiratory failure    Subjective:     HPI:  25 year old male with spastic cerebral palsy, severe contractures, seizure disorder, malnutrition  Presented to ED from LTAC when found face down, unresponsive, with hypoxia  OF NOTE - recent hospitalizations x 2 for pneumonia (serratia), second hospitalization complicated with osteomyelitis from sacral decubitus (underwent surgical I&D) and pneumoperitoneum from suspected colonic perforation that was managed conservatively    On arrival today respiratory rate 50, , sat 89%, SBP 170s, and fever 102  Intubated on ED arrival with anesthesia assistance  Labs revealed leukocytosis, lactic acid 2.3,and procalcitonin 0.12  CT chest abd pelvis revealed - Right lower lobe probable pneumonia.  Osseous erosion could appear similarly but is less favored.  Additional scattered areas of consolidation in the right lung likely also related to infection.  There is a large decubitus ulcer right greater than left. The right ulcer tracks to the posterior aspect of the right inferior pubic ramus. Osteomyelitis cannot be excluded.     Hospital/ICU Course:  Arrived to ICU with OETT to mechanical ventilation, propofol sedation  10/23 - remains intubated on mechanical ventilation; oxygen demand decreased; large amount of thick secretions from trach and oral pharynx      Review of Systems   Unable to perform ROS: Intubated     Objective:     Vital Signs (Most Recent):  Temp: 98.8 °F (37.1 °C) (10/23/20 0305)  Pulse: (!) 115 (10/23/20 1000)  Resp: (!) 34 (10/23/20 1000)  BP: 112/75 (10/23/20 1000)  SpO2: 100 % (10/23/20 1000) Vital Signs (24h Range):  Temp:  [98.2 °F (36.8  Pt called back I informed her of Dr. Javed message on her B12 results.  Verbal understanding, no other questions call ended.    °C)-103 °F (39.4 °C)] 98.8 °F (37.1 °C)  Pulse:  [115-172] 115  Resp:  [10-50] 34  SpO2:  [77 %-100 %] 100 %  BP: (102-187)/() 112/75     Weight: 34.3 kg (75 lb 9.9 oz)  Body mass index is 16.36 kg/m².      Intake/Output Summary (Last 24 hours) at 10/23/2020 1054  Last data filed at 10/23/2020 0600  Gross per 24 hour   Intake 1870 ml   Output 610 ml   Net 1260 ml      fentanyl 150 mcg/hr (10/23/20 0600)    propofoL 5 mcg/kg/min (10/23/20 1044)       Physical Exam  Vitals signs and nursing note reviewed.   Constitutional:       Appearance: He is underweight. He is ill-appearing.      Interventions: He is sedated and intubated.   HENT:      Head: Atraumatic.   Eyes:      Conjunctiva/sclera: Conjunctivae normal.   Neck:      Musculoskeletal: No edema.      Comments: Severe bilateral upper and lower extremity contractures  Cardiovascular:      Rate and Rhythm: Regular rhythm. Tachycardia present.      Pulses:           Radial pulses are 1+ on the right side and 1+ on the left side.        Dorsalis pedis pulses are 1+ on the right side and 1+ on the left side.   Pulmonary:      Effort: He is intubated.      Breath sounds: Decreased breath sounds present. No wheezing, rhonchi or rales.   Abdominal:      General: Bowel sounds are decreased. There is no distension.      Palpations: Abdomen is soft.      Comments: Scarring around button PEG   Musculoskeletal:      Right lower leg: No edema.      Left lower leg: No edema.   Skin:         Neurological:      GCS: GCS eye subscore is 3. GCS verbal subscore is 1. GCS motor subscore is 1.         Vents:  Vent Mode: Spont (10/23/20 1000)  Ventilator Initiated: Yes (10/22/20 1444)  Set Rate: 0 BPM (10/23/20 1000)  Vt Set: 325 mL (10/23/20 1000)  Pressure Support: 12 cmH20 (10/23/20 1000)  PEEP/CPAP: 5 cmH20 (10/23/20 1000)  Oxygen Concentration (%): 30 (10/23/20 1000)  Peak Airway Pressure: 17 cmH2O (10/23/20 1000)  Plateau Pressure: 0 cmH20 (10/23/20 1000)  Total Ve: 5.85  mL (10/23/20 1000)  F/VT Ratio<105 (RSBI): (!) 89.95 (10/23/20 1000)    Lines/Drains/Airways     Peripherally Inserted Central Catheter Line            PICC Double Lumen 10/22/20 1300 less than 1 day          Drain                 Gastrostomy/Enterostomy 08/04/20 1653 Percutaneous endoscopic gastrostomy (PEG) feeding 79 days         Gastrostomy/Enterostomy 10/22/20 1300 LUQ less than 1 day         Urethral Catheter 10/22/20 1653 Latex 16 Fr. less than 1 day          Airway                 Airway - Non-Surgical 10/22/20 1430 Endotracheal Tube less than 1 day          Peripheral Intravenous Line                 Midline Catheter Insertion/Assessment  - Single Lumen 10/22/20 1548 Right basilic vein (medial side of arm) 20g x 8cm less than 1 day                Significant Labs:    CBC/Anemia Profile:  Recent Labs   Lab 10/22/20  1458 10/23/20  0459   WBC 18.73* 19.36*   HGB 11.2* 7.8*   HCT 36.3* 24.8*   * 517*   MCV 95 94   RDW 13.8 14.1        Chemistries:  Recent Labs   Lab 10/22/20  1458 10/23/20  0459    137   K 4.1 3.5    104   CO2 20* 24   BUN 17 9   CREATININE 0.5 0.4*   CALCIUM 9.0 7.9*   ALBUMIN 2.4* 1.8*   PROT 7.4 5.7*   BILITOT 0.1 0.2   ALKPHOS 114 162*   ALT 12 27   AST 17 65*   MG  --  1.6   PHOS  --  3.0       All pertinent labs within the past 24 hours have been reviewed.    Significant Imaging:   I have reviewed all pertinent imaging results/findings within the past 24 hours.      ABG  Recent Labs   Lab 10/23/20  0548   PH 7.403   PO2 134*   PCO2 39.7   HCO3 24.8   BE 0     Assessment/Plan:     Neuro  Seizure disorder  Continue home dose phenobarbital    Cerebral palsy  Sedated for mechanical ventilation  Resume baclofen, neurontin    Derm  Decubitus ulcer of ischial area, left, stage IV  Appreciate WOC eval, follow care recs    Pulmonary  * Acute hypoxemic respiratory failure  Vent settings reviewed and adjusted to optimize gas exchange  VAP prophylaxis  10/23 - decreased oxygen  demand and lung mechanics today; strong suspicion for inability to protect airway + thick secretions, will add hypertonic saline nebs and maintain intubation today    Right lower lobe pneumonia  Recent flouroquinolone resistant seratia pneumonia  Empiric vanc, zosyn  Culture endotracheal aspirate  Monitor temp, wbc, culture data  Recommend trach placement for airway protection from recurrent aspiration pneumonias; discussed with mother and answered all questions. Consulted ENT and spoke with Dr Palma who will plan for trach placement on Tuesday 10/27/20 at 8am    Cardiac/Vascular  Tachycardia  Suspect baseline sinus tachycardia exacerbated by fever, sepsis  Holding enteral propanolol given marginal BP  Optimize analgesia, sedation  10/23 - resume home dose propanolol    ID  Osteomyelitis of pelvis  Transferred to LTAC on 10/10 with plan for IV abx and recs to continue until 10/22  Currently on abx coverage for aspiration pneumonia   If concern for ongoing osteo, will likely need MRI    Endocrine  Severe protein-calorie malnutrition  TF per RD recs        Critical Care Daily Checklist:    A: Awake: RASS Goal/Actual Goal: RASS Goal: -1-->drowsy  Actual: [place] Agitation Sedation Scale (RASS): Drowsy   B: Spontaneous Breathing Trial Performed? Spon. Breathing Trial Initiated?: (S) Initiated (10/23/20 0800)   C: SAT & SBT Coordinated?  yes                      D: Delirium: CAM-ICU     E: Early Mobility Performed? Yes   F: Feeding Goal: Goals: Meet >75% EEN/EPN by RD f/u  Status: Nutrition Goal Status: new   Current Diet Order   Procedures    Diet NPO      AS: Analgesia/Sedation Fentanyl, propofol   T: Thromboembolic Prophylaxis lovenox   H: HOB > 300 Yes   U: Stress Ulcer Prophylaxis (if needed) pepcid   G: Glucose Control monitoring   B: Bowel Function     I: Indwelling Catheter (Lines & Iglesias) Necessity reviewed   D: De-escalation of Antimicrobials/Pharmacotherapies reviewed    Plan for the day/ETD As above    Code  Status:  Family/Goals of Care: Full Code  Anticipate LTAC on discharge   I have discussed case and plan of care in detail with Dr Garcia and Dr Palma; Status and plan of care were discussed with team on multidisciplinary rounds.    Critical Care Time: 55 minutes  Critical secondary to mechanical ventilation; Critical care was time spent personally by me on the following activities: development of treatment plan with patient or surrogate and bedside caregivers, discussions with consultants, evaluation of patient's response to treatment, examination of patient, ordering and performing treatments and interventions, ordering and review of laboratory studies, ordering and review of radiographic studies, pulse oximetry, re-evaluation of patient's condition. This critical care time did not overlap with that of any other provider or involve time for any procedures.     JEFF Panchal-BC  Critical Care Medicine  [facility] [place]

## 2024-05-29 NOTE — ASSESSMENT & PLAN NOTE
Physical Therapy  Facility/Department: [address]  Physical Therapy Treatment Note    Name: Lesley Cutler  : 1943  MRN: [id number]  Date of Service: 2024    Discharge Recommendations:  No therapy recommended at discharge   PT Equipment Recommendations  Equipment Needed: No      Patient Diagnosis(es): The encounter diagnosis was JOSE (acute kidney injury) (HCC).  Past Medical History:  has a past medical history of Age related osteoporosis, Anemia, CKD (chronic kidney disease), Does use hearing aid, Fibromyalgia, Frequent PVCs, GERD (gastroesophageal reflux disease), Hepatocellular carcinoma (HCC), History of blood transfusion, History of chemotherapy, History of colon polyps, Hyperlipidemia, Liver cancer (HCC), Osteoporosis, S/P liver transplant (HCC), Sleep apnea, Sleep difficulties, and Type 2 diabetes mellitus without complication (HCC).  Past Surgical History:  has a past surgical history that includes Liver transplant (2017); cyst removal; Tonsillectomy; Partial hysterectomy; Endoscopy, colon, diagnostic; Colonoscopy; hernia repair (2018); ventral hernia repair (N/A, 2018); and Colonoscopy (N/A, 2019).    Assessment   Assessment: Pt presents with fatigue, SOB and anemia. Pt lives alone and at baseline is independent gait no device, ind ADLs and continues to drive. Pt currently rdemonstrating independence with transfers, ambulated 60ft no device independently. Pt ascended and descended 4 stairs with one railing and modified independence. Pt denies SOB. RT present to monitor O2 saturation with activity, on room air pt was 94% while performing stairs. Pt demonstrates adequate safety for return to prior living situation. Pt does not require skill of continued PT.    Requires PT Follow-Up: No    Activity Tolerance  Activity Tolerance: Patient tolerated treatment well  Activity Tolerance Comments: RT present to monitor O2 saturation on room air. Lowest saturation at 94% on  Replace IV and via PEG today  Labs in AM  Likely due to severe diarrhea  telemetry  K+ better 2.2>3.3 after 5 >> 10meq IV riders in ER and 20meq po   Will repeat g tube this am 40meq po once this am    8/6: resolved. Stop K in fluids today. Still having diarrhea so will keep 1 more day and repeat labs in morning to be sure remains stable as this was his main reason for admission     bedrails?: None  How much help is needed moving to and from a bed to a chair?: None  How much help is needed standing up from a chair using your arms?: None  How much help is needed walking in hospital room?: None  How much help is needed climbing 3-5 steps with a railing?: None  AM-PAC Inpatient Mobility Raw Score : 24  AM-PAC Inpatient T-Scale Score : 61.14  Mobility Inpatient CMS 0-100% Score: 0  Mobility Inpatient CMS G-Code Modifier : CH                Goals  Short Term Goals  Time Frame for Short Term Goals: 14 visits  Short Term Goal 1: supine to and from sit independent  Short Term Goal 2: sit to and from stand independent  Short Term Goal 3: ambulate 200ft no device independent  Short Term Goal 4: 3 steps with right railing modified independent  Patient Goals   Patient Goals : to go home       Education  Patient Education  Education Given To: Patient  Education Provided Comments: slow progression to increasing activity level at home  Education Method: Verbal  Barriers to Learning: None  Education Outcome: Verbalized understanding;Demonstrated understanding      Therapy Time   Individual Concurrent Group Co-treatment   Time In 1217         Time Out 1227         Minutes 10         Timed Code Treatment Minutes: 8 Minutes       WILLY PIERRE, PT

## 2025-05-30 NOTE — NURSING TRANSFER
Nursing Transfer Note      10/5/2020     Transfer FROM ICU    Transfer via bed    Transfer with belongings    Medicines sent: yes    Chart send with patient: yes      Patient reassessed at: 10/5/2020 @ 0429    Upon arrival to floor: mom at bedside   Anesthesia Post-op Note    Patient: Dale Sorto  Procedure(s) Performed: COLONOSCOPY  Anesthesia type: MAC    Vitals Value Taken Time   Temp 36.5 05/30/25 1426   Pulse 78 05/30/25 1426   Resp 12 05/30/25 1426   SpO2 97 05/30/25 1426   /70 05/30/25 1426         Patient Location: PACU Phase 1  Post-op Vital Signs:stable  Level of Consciousness: participates in exam, alert and awake  Respiratory Status: spontaneous ventilation  Cardiovascular stable  Hydration: euvolemic  Pain Management: Pain control: Pain evaluated and addressed.  Handoff: Handoff to receiving clinician was performed and questions were answered  Vomiting: PONV severity: Vomitting evaluated and addressed.  Nausea: Nausea: Nausea evaluated and addressed.  Airway Patency:patent  Post-op Assessment: no complications and patient tolerated procedure well      No notable events documented.

## (undated) DEVICE — SUT MONOCRYL 4.0 PS2 CP496G

## (undated) DEVICE — CORD LAP 10 DISP

## (undated) DEVICE — STAPLER RELOADABLE LINEAR 30M

## (undated) DEVICE — TIP SUC SIGMOIDOSCOPE 18F 12IN

## (undated) DEVICE — ELECTRODE BLD EXT 6.50 ST DISP

## (undated) DEVICE — SEAL SCOPE WARMER 20/BX

## (undated) DEVICE — SOL NS 1000CC

## (undated) DEVICE — TUBING HEATED INSUFFLATOR

## (undated) DEVICE — SUT SILK 3-0 STRANDS 30IN

## (undated) DEVICE — NDL SAFETY 22G X 1.5 ECLIPSE

## (undated) DEVICE — SUT 1 36IN PDS II VIO MONO

## (undated) DEVICE — UNDERGLOVES BIOGEL PI SIZE 7.5

## (undated) DEVICE — SHEET THYROID W/ISO-BAC

## (undated) DEVICE — COVER OVERHEAD SURG LT BLUE

## (undated) DEVICE — SUT VICRYL PLUS 3-0 SH 18IN

## (undated) DEVICE — TRACH TUBE CUFF FLEX DISP 8.5

## (undated) DEVICE — SEE MEDLINE ITEM 157117

## (undated) DEVICE — GLOVE SURG BIOGEL LATEX SZ 7.5

## (undated) DEVICE — KIT ANTIFOG

## (undated) DEVICE — ELECTRODE REM PLYHSV RETURN 9

## (undated) DEVICE — SYR 3CC LUER LOC

## (undated) DEVICE — SUT CTD VICRYL 2-0 UND BR

## (undated) DEVICE — SUT PDS II 1 TP-1 VIL

## (undated) DEVICE — SUT VICRYL 3-0 27 CT-1

## (undated) DEVICE — PAD ABD 8X10 STERILE

## (undated) DEVICE — SUT MONOCYRL 4-0 PS2 UND

## (undated) DEVICE — GRASPER TIP RENEW LAP FENEST

## (undated) DEVICE — KIT GELPORT LAPAROSCOPIC ABD

## (undated) DEVICE — MANIFOLD 4 PORT

## (undated) DEVICE — SUT SILK 2-0 STRANDS 30IN

## (undated) DEVICE — TAPE CLOTH SOFT MEDIPORE 4IN

## (undated) DEVICE — SUT SILK 3-0 SH 18IN BLACK

## (undated) DEVICE — TROCAR ENDOPATH XCEL 12X100MM

## (undated) DEVICE — SUT SILK 0 SUTUPAK SA86H

## (undated) DEVICE — GLOVE BIOGEL 7.5

## (undated) DEVICE — STAPLER INT PROX TX 60X3.5MM

## (undated) DEVICE — SYR 10CC LUER LOCK

## (undated) DEVICE — SEE MEDLINE ITEM 146345

## (undated) DEVICE — SUT VICRYL 2-0 36 CT-1

## (undated) DEVICE — SEE MEDLINE ITEM 152622

## (undated) DEVICE — DRAPE ABDOMINAL TIBURON 14X11

## (undated) DEVICE — SPONGE LAP 18X18 PREWASHED

## (undated) DEVICE — SYR 30CC LUER LOCK

## (undated) DEVICE — SUT 2/0 30IN SILK BLK BRAI

## (undated) DEVICE — SUT VICRYL BR 1 GEN 27 CT-1

## (undated) DEVICE — NDL PNEUMO INSUFFLATI 120MM

## (undated) DEVICE — GAUZE SPONGE 4X4 12PLY

## (undated) DEVICE — SUT 1 48IN PDS II VIO MONO

## (undated) DEVICE — SUT VICRYL 3-0 27 SH

## (undated) DEVICE — BAG POSTOP W/ACCESS CUT TO FIT

## (undated) DEVICE — SEE MEDLINE ITEM 157027

## (undated) DEVICE — APPLICATOR CHLORAPREP ORN 26ML

## (undated) DEVICE — NDL SPINAL 20GX3.5 HUB

## (undated) DEVICE — SPONGE IV DRAIN 4X4 STERILE

## (undated) DEVICE — SUT PROLENE 2-0 SH 36IN BLU

## (undated) DEVICE — TROCAR SPACEMAKER BLUNT 10MM

## (undated) DEVICE — CLOSURE SKIN STERI STRIP 1/2X4

## (undated) DEVICE — Device

## (undated) DEVICE — SUT VICRYL 0 SH

## (undated) DEVICE — TROCAR ENDOPATH XCEL 5X100MM

## (undated) DEVICE — SUT SILK 2-0 BLK BR KS 30 I

## (undated) DEVICE — BLADE EZ CLEAN 2.5IN MODIFIED

## (undated) DEVICE — PACK DRAPE PERI/GYN TIBURON

## (undated) DEVICE — SUT SILK 3-0 SH DETACH 30IN

## (undated) DEVICE — DEVICE ENSEAL X1 LARGE JAW

## (undated) DEVICE — SEE MEDLINE ITEM 152739

## (undated) DEVICE — NDL SAFETY 25G X 1.5 ECLIPSE

## (undated) DEVICE — CANNULA ENDOPATH XCEL 5X100MM